# Patient Record
Sex: FEMALE | Race: WHITE | NOT HISPANIC OR LATINO | Employment: OTHER | URBAN - METROPOLITAN AREA
[De-identification: names, ages, dates, MRNs, and addresses within clinical notes are randomized per-mention and may not be internally consistent; named-entity substitution may affect disease eponyms.]

---

## 2018-03-03 PROCEDURE — 99284 EMERGENCY DEPT VISIT MOD MDM: CPT

## 2018-03-04 ENCOUNTER — HOSPITAL ENCOUNTER (EMERGENCY)
Facility: HOSPITAL | Age: 73
Discharge: HOME | End: 2018-03-04
Attending: EMERGENCY MEDICINE
Payer: MEDICARE

## 2018-03-04 VITALS
HEART RATE: 77 BPM | DIASTOLIC BLOOD PRESSURE: 73 MMHG | RESPIRATION RATE: 20 BRPM | TEMPERATURE: 98 F | OXYGEN SATURATION: 97 % | WEIGHT: 160 LBS | HEIGHT: 64 IN | BODY MASS INDEX: 27.31 KG/M2 | SYSTOLIC BLOOD PRESSURE: 137 MMHG
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
## 2018-03-04 DIAGNOSIS — S00.83XA CONTUSION OF FACE, INITIAL ENCOUNTER: Primary | ICD-10-CM

## 2018-03-04 RX ORDER — LIDOCAINE AND PRILOCAINE 25; 25 MG/G; MG/G
CREAM TOPICAL
Status: ON HOLD | COMMUNITY
Start: 2018-02-27 | End: 2019-08-07 | Stop reason: SDUPTHER

## 2018-03-04 RX ORDER — AMLODIPINE BESYLATE 10 MG/1
10 TABLET ORAL DAILY
Status: ON HOLD | COMMUNITY
End: 2019-08-07 | Stop reason: SDUPTHER

## 2018-03-04 RX ORDER — CYCLOBENZAPRINE HCL 5 MG
5 TABLET ORAL
COMMUNITY
End: 2019-04-11

## 2018-03-04 RX ORDER — METOPROLOL TARTRATE 100 MG/1
100 TABLET ORAL 2 TIMES DAILY
Status: ON HOLD | COMMUNITY
End: 2019-08-07 | Stop reason: SDUPTHER

## 2018-03-04 RX ORDER — TIOTROPIUM BROMIDE 18 UG/1
18 CAPSULE ORAL; RESPIRATORY (INHALATION) DAILY
Status: ON HOLD | COMMUNITY
Start: 2018-02-28 | End: 2019-08-07 | Stop reason: SDUPTHER

## 2018-03-04 RX ORDER — VALSARTAN 320 MG/1
320 TABLET ORAL DAILY
Status: ON HOLD | COMMUNITY
End: 2019-08-07 | Stop reason: SDUPTHER

## 2018-03-04 RX ORDER — PREDNISONE 50 MG/1
50 TABLET ORAL DAILY
Qty: 5 TABLET | Refills: 0 | Status: SHIPPED | OUTPATIENT
Start: 2018-03-04 | End: 2018-03-09

## 2018-03-04 RX ORDER — DEXLANSOPRAZOLE 60 MG/1
60 CAPSULE, DELAYED RELEASE ORAL DAILY
Status: ON HOLD | COMMUNITY
End: 2019-08-07 | Stop reason: SDUPTHER

## 2018-03-04 RX ORDER — GEMFIBROZIL 600 MG/1
600 TABLET, FILM COATED ORAL DAILY
Status: ON HOLD | COMMUNITY
End: 2019-08-07 | Stop reason: SDUPTHER

## 2018-03-04 RX ORDER — HYDROCODONE BITARTRATE AND ACETAMINOPHEN 5; 325 MG/1; MG/1
1 TABLET ORAL
COMMUNITY
Start: 2018-01-27 | End: 2019-04-11

## 2018-03-04 RX ORDER — VENLAFAXINE HYDROCHLORIDE 150 MG/1
150 CAPSULE, EXTENDED RELEASE ORAL
COMMUNITY
End: 2019-04-11

## 2018-03-04 RX ORDER — ROFLUMILAST 500 UG/1
500 TABLET ORAL DAILY
COMMUNITY
Start: 2017-04-20 | End: 2019-07-26

## 2018-03-04 RX ORDER — RANOLAZINE 1000 MG/1
1000 TABLET, EXTENDED RELEASE ORAL 2 TIMES DAILY
Status: ON HOLD | COMMUNITY
End: 2019-08-07 | Stop reason: SDUPTHER

## 2018-03-04 RX ORDER — FUROSEMIDE 20 MG/1
10 TABLET ORAL 2 TIMES DAILY
Status: ON HOLD | COMMUNITY
End: 2019-08-07 | Stop reason: HOSPADM

## 2018-03-04 RX ORDER — CLOPIDOGREL BISULFATE 75 MG/1
75 TABLET ORAL DAILY
Status: ON HOLD | COMMUNITY
End: 2019-08-07 | Stop reason: SDUPTHER

## 2018-03-04 RX ORDER — TEMAZEPAM 15 MG/1
15 CAPSULE ORAL NIGHTLY
Status: ON HOLD | COMMUNITY
End: 2019-08-07 | Stop reason: SDUPTHER

## 2018-03-04 RX ORDER — NORTRIPTYLINE HYDROCHLORIDE 10 MG/1
CAPSULE ORAL
COMMUNITY
Start: 2017-07-18 | End: 2019-04-11

## 2018-03-04 RX ORDER — TOLTERODINE TARTRATE 2 MG/1
4 TABLET, EXTENDED RELEASE ORAL DAILY
Status: ON HOLD | COMMUNITY
End: 2019-08-07 | Stop reason: SDUPTHER

## 2018-03-04 RX ORDER — IBANDRONATE SODIUM 150 MG/1
150 TABLET, FILM COATED ORAL
COMMUNITY
End: 2019-04-11

## 2018-03-04 RX ORDER — LORATADINE 10 MG/1
10 TABLET ORAL DAILY
COMMUNITY
End: 2019-05-22

## 2018-03-04 RX ORDER — PREGABALIN 100 MG/1
100 CAPSULE ORAL
COMMUNITY
Start: 2017-08-10 | End: 2019-04-11

## 2018-03-04 RX ORDER — ALBUTEROL SULFATE 90 UG/1
2 AEROSOL, METERED RESPIRATORY (INHALATION) EVERY 4 HOURS PRN
Status: ON HOLD | COMMUNITY
Start: 2017-01-11 | End: 2019-08-07 | Stop reason: SDUPTHER

## 2018-03-04 RX ORDER — METFORMIN HYDROCHLORIDE 500 MG/1
500 TABLET ORAL DAILY
Status: ON HOLD | COMMUNITY
Start: 2015-01-15 | End: 2019-08-07 | Stop reason: SDUPTHER

## 2018-03-04 RX ORDER — BUDESONIDE AND FORMOTEROL FUMARATE DIHYDRATE 160; 4.5 UG/1; UG/1
2 AEROSOL RESPIRATORY (INHALATION) 2 TIMES DAILY
COMMUNITY
Start: 2018-02-28 | End: 2019-07-26

## 2018-03-04 RX ORDER — VALACYCLOVIR HYDROCHLORIDE 1 G/1
1000 TABLET, FILM COATED ORAL
COMMUNITY
Start: 2018-02-27 | End: 2019-04-11

## 2018-03-04 RX ORDER — ALBUTEROL SULFATE 0.83 MG/ML
2.5 SOLUTION RESPIRATORY (INHALATION) 4 TIMES DAILY
COMMUNITY
Start: 2017-01-11 | End: 2019-07-26

## 2018-03-04 NOTE — ED PROVIDER NOTES
SCRIBE #1 NOTE: I, Darrel Avila, am scribing for, and in the presence of, Brennon Ac MD. I have scribed the entire note.      History      Chief Complaint   Patient presents with    Fall     patient fell 2 weeks ago, noticed today left eye with hematoma and bruising       Review of patient's allergies indicates:   Allergen Reactions    Pcn [penicillins] Anaphylaxis        HPI   HPI    3/4/2018, 12:13 AM   History obtained from the patient      History of Present Illness: Shireen Felix is a 72 y.o. female patient who presents to the Emergency Department for L periorbital contusion which pt noticed today when she woke up. Pt states she first noticed ecchymosis under her L eye with mild swelling and reports the swelling worsening throughout the day. Symptoms are constant and moderate in severity. Sx are exacerbated by nothing and relieved by nothing. Pt also reports mild pain to her L periorbital region. No other sxs reported. Pt states she is not sure of what happened. Pt denies recent injury/fall. Patient denies any fever, N/V/D, chills, visual disturbance, photophobia, eye redness, HA, dizziness, lightheadedness, nose pain and all other sxs at this time. No further complaints or concerns at this time.       Arrival mode: Personal vehicle      PCP: No primary care provider on file.       Past Medical History:  Past Medical History:   Diagnosis Date    Anticoagulant long-term use     Diabetes mellitus     Hypertension     Stroke        Past Surgical History:  Past Surgical History:   Procedure Laterality Date    BRAIN SURGERY           Family History:  History reviewed. No pertinent family history.    Social History:  Social History     Social History Main Topics    Smoking status: Never Smoker    Smokeless tobacco: Never Used    Alcohol use No    Drug use: Unknown    Sexual activity: Unknown       ROS   Review of Systems   Constitutional: Negative for chills and fever.   HENT: Negative for  congestion and sore throat.    Eyes:        (+)L periorbital contusion (+)mild periorbital pain   Respiratory: Negative for chest tightness and shortness of breath.    Cardiovascular: Negative for chest pain.   Gastrointestinal: Negative for abdominal pain, nausea and vomiting.   Genitourinary: Negative for difficulty urinating and dysuria.   Musculoskeletal: Negative for back pain and neck pain.   Skin: Negative for rash.   Neurological: Negative for dizziness, numbness and headaches.   Psychiatric/Behavioral: Negative for agitation and confusion.   All other systems reviewed and are negative.      Physical Exam      Initial Vitals   BP Pulse Resp Temp SpO2   03/04/18 0000 03/03/18 2357 03/03/18 2357 03/03/18 2357 03/03/18 2357   137/73 77 20 97.7 °F (36.5 °C) 97 %      MAP       03/04/18 0000       94.33          Physical Exam  Nursing Notes and Vital Signs Reviewed.  Constitutional: Patient is in no apparent distress. Well-developed and well-nourished.  Head:  Normocephalic.  Eyes: PERRL. EOM intact. Conjunctivae are not pale. No scleral icterus. L periorbital contusion noted.  ENT: Mucous membranes are moist. Oropharynx is clear and symmetric.   Neck: Supple. Full ROM. No lymphadenopathy.  Cardiovascular: Regular rate. Regular rhythm. No murmurs, rubs, or gallops. Distal pulses are 2+ and symmetric.  Pulmonary/Chest: No respiratory distress. Clear to auscultation bilaterally. No wheezing or rales.  Abdominal: Soft and non-distended.  There is no tenderness.  No rebound, guarding, or rigidity. Good bowel sounds.  Musculoskeletal: Moves all extremities. No obvious deformities. No edema. No calf tenderness.  Skin: Warm and dry.  Neurological:  Alert, awake, and appropriate.  Normal speech.  No acute focal neurological deficits are appreciated.  Psychiatric: Normal affect. Good eye contact. Appropriate in content.    ED Course    Procedures  ED Vital Signs:  Vitals:    03/03/18 2357 03/04/18 0000   BP:  137/73  "  Pulse: 77    Resp: 20    Temp: 97.7 °F (36.5 °C)    TempSrc: Oral    SpO2: 97%    Weight: 72.6 kg (160 lb)    Height: 5' 4" (1.626 m)        Imaging Results:    Per [facility], pt's CT results:   CT head without IV contrast: 1) Left periorbital soft tissue swelling. No acute intracranial abnormality. 2) Postoperative changes of bilateral frontal and temporal craniotomies. Aneurysm clip in the floor of the left middle cranial fossa.      CT maxillofacial without IV contrast: Left periorbital soft tissue swelling. No acute facial bone fracture.          The Emergency Provider reviewed the vital signs and test results, which are outlined above.    ED Discussion     1:48 AM: Reassessed pt at this time. Pt is laying comfortably in ED bed and in NAD. Pt is awake, alert, and oriented. Discussed with pt all pertinent ED information and results. Discussed pt dx and plan of tx. Gave pt all f/u and return to the ED instructions. All questions and concerns were addressed at this time. Pt expresses understanding of information and instructions, and is comfortable with plan to discharge. Pt is stable for discharge.    I discussed with patient and/or family/caretaker that evaluation in the ED does not suggest any emergent or life threatening medical conditions requiring immediate intervention beyond what was provided in the ED, and I believe patient is safe for discharge.  Regardless, an unremarkable evaluation in the ED does not preclude the development or presence of a serious of life threatening condition. As such, patient was instructed to return immediately for any worsening or change in current symptoms.      ED Medication(s):  Medications - No data to display    New Prescriptions    PREDNISONE (DELTASONE) 50 MG TAB    Take 1 tablet (50 mg total) by mouth once daily.       Follow-up Information     [facility] In 2 days.    Specialty:  Internal Medicine  Contact information:  [id number] " [facility] [address]  [phone]  Additional information:  (off [facility]) 1st floor                   Medical Decision Making    Medical Decision Making:   Clinical Tests:   Radiological Study: Ordered and Reviewed           Scribe Attestation:   Scribe #1: I performed the above scribed service and the documentation accurately describes the services I performed. I attest to the accuracy of the note.    Attending:   Physician Attestation Statement for Scribe #1: I, Brennon Ac MD, personally performed the services described in this documentation, as scribed by Darrel Avila, in my presence, and it is both accurate and complete.          Clinical Impression       ICD-10-CM ICD-9-CM   1. Contusion of face, initial encounter S00.83XA 920   2. Fall, initial encounter W19.XXXA E888.9       Disposition:   Disposition: Discharged  Condition: Stable         Brennon Ac MD  03/04/18 0211

## 2019-04-11 ENCOUNTER — OFFICE VISIT (OUTPATIENT)
Dept: OTOLARYNGOLOGY | Facility: CLINIC | Age: 74
End: 2019-04-11
Payer: MEDICARE

## 2019-04-11 VITALS
DIASTOLIC BLOOD PRESSURE: 74 MMHG | BODY MASS INDEX: 24.75 KG/M2 | TEMPERATURE: 98 F | SYSTOLIC BLOOD PRESSURE: 124 MMHG | HEART RATE: 74 BPM | WEIGHT: 144.19 LBS

## 2019-04-11 DIAGNOSIS — Z79.01 ANTICOAGULATED: ICD-10-CM

## 2019-04-11 DIAGNOSIS — J34.89 NASAL SEPTAL PERFORATION: Primary | ICD-10-CM

## 2019-04-11 DIAGNOSIS — J31.0 CHRONIC RHINITIS: ICD-10-CM

## 2019-04-11 PROCEDURE — 99999 PR PBB SHADOW E&M-EST. PATIENT-LVL IV: CPT | Mod: PBBFAC,,, | Performed by: PHYSICIAN ASSISTANT

## 2019-04-11 PROCEDURE — 99214 OFFICE O/P EST MOD 30 MIN: CPT | Mod: PBBFAC | Performed by: PHYSICIAN ASSISTANT

## 2019-04-11 PROCEDURE — 99204 PR OFFICE/OUTPT VISIT, NEW, LEVL IV, 45-59 MIN: ICD-10-PCS | Mod: S$PBB,,, | Performed by: PHYSICIAN ASSISTANT

## 2019-04-11 PROCEDURE — 99999 PR PBB SHADOW E&M-EST. PATIENT-LVL IV: ICD-10-PCS | Mod: PBBFAC,,, | Performed by: PHYSICIAN ASSISTANT

## 2019-04-11 PROCEDURE — 99204 OFFICE O/P NEW MOD 45 MIN: CPT | Mod: S$PBB,,, | Performed by: PHYSICIAN ASSISTANT

## 2019-04-11 NOTE — PROGRESS NOTES
"Subjective:       Patient ID: Shireen Felix is a 73 y.o. female.    Chief Complaint: Check nose (States "has holes in the inside of nose))    Patient is a very pleasant 73 year old female here to see me today for the first time for evaluation of "whistling" sound in her nose and chronic nasal congestion.  She has previously been followed by Dr. Spencer (ENT) for recurrent nasal lesion with epistaxis x 4 years.  She says she was later told she had "hole" in her nose and biopsy was recommended but not done per patient.  She is on Plavix.  She has not had nosebleed in over 4-6 weeks.  She denies significant nasal drainage but says she often has drip from her nose with postnasal drip (clear to yellow in color).  Denies nasal obstruction; often has nasal and chest congestion.  At times she feels as though she has swelling and itching in her nose, worse on the left side.  She has suffered with postherpetic neuralgia on LEFT scalp and face x 2 years with corneal ulceration in past.  She's not currently using any nasal sprays or gels.  She does not typically suffer with sinus infections.  She has been followed by Rheumatology at [place] (9/2018) for primary osteoarthritis.  She quit smoking 15 years ago; had smoked 1 pack per day x 35 years.  She saw Pulmonary earlier today and was advised to resume Flonase, Claritin and Mucinex.  Also was changed from daily Zithromax to Ciprofloxacin and oral steroids ordered.  No fever.     Review of Systems   Constitutional: Positive for fatigue. Negative for activity change, appetite change and fever.   HENT: Positive for congestion, hearing loss (hx RIGHT tympanoplasty), nosebleeds (none in 4-6 weeks), postnasal drip, rhinorrhea and sinus pressure. Negative for ear discharge, ear pain, facial swelling, sinus pain, sneezing, sore throat and trouble swallowing.    Eyes: Positive for visual disturbance. Negative for discharge.   Respiratory: Positive for cough and shortness of " breath.    Cardiovascular: Negative for chest pain.   Gastrointestinal: Negative for diarrhea, nausea and vomiting.   Musculoskeletal: Positive for arthralgias.   Allergic/Immunologic: Negative for environmental allergies.   Neurological: Positive for seizures (hx of craniotomy for aneurysm) and headaches. Negative for light-headedness.        Left facial neuralgia (postherpetic)   Hematological: Negative for adenopathy.   Psychiatric/Behavioral: Negative for confusion.       Objective:      Physical Exam   Constitutional: She is oriented to person, place, and time. She appears well-developed and well-nourished. She is cooperative. She does not appear ill. No distress.   HENT:   Head: Normocephalic and atraumatic.   Right Ear: External ear and ear canal normal. Tympanic membrane is scarred (with loss of landmarks) and retracted. Tympanic membrane is not erythematous. No middle ear effusion.   Left Ear: Tympanic membrane, external ear and ear canal normal. Tympanic membrane is not erythematous.  No middle ear effusion.   Nose: No mucosal edema (able to see left middle turbinate), rhinorrhea, nasal deformity or septal deviation. No epistaxis (no active bleeding). Right sinus exhibits no maxillary sinus tenderness and no frontal sinus tenderness. Left sinus exhibits maxillary sinus tenderness and frontal sinus tenderness.   Mouth/Throat: Uvula is midline, oropharynx is clear and moist and mucous membranes are normal. Mucous membranes are not pale and not dry. She has dentures. No trismus in the jaw. No oropharyngeal exudate or posterior oropharyngeal erythema.   Large septal perforation 1cm diameter with minimal crusting along posterior aspect with bowing inferiorly; left temporal wasting   Eyes: Pupils are equal, round, and reactive to light. Conjunctivae, EOM and lids are normal. Right eye exhibits no chemosis. Left eye exhibits no chemosis. Right conjunctiva is not injected. Left conjunctiva is not injected. No  scleral icterus. Right eye exhibits normal extraocular motion and no nystagmus. Left eye exhibits normal extraocular motion and no nystagmus.   Neck: Trachea normal and phonation normal. No tracheal tenderness present. No tracheal deviation present. No thyroid mass and no thyromegaly present.   Cardiovascular: Intact distal pulses.   Pulmonary/Chest: Effort normal. No stridor. No respiratory distress.   Abdominal: She exhibits no distension.   Lymphadenopathy:        Head (right side): No submental, no submandibular, no preauricular and no posterior auricular adenopathy present.        Head (left side): No submental, no submandibular, no preauricular and no posterior auricular adenopathy present.     She has no cervical adenopathy.   Neurological: She is alert and oriented to person, place, and time. No cranial nerve deficit.   Skin: Skin is warm and dry. No rash noted. No erythema.   Psychiatric: She has a normal mood and affect. Her behavior is normal.       Assessment:       1. Nasal septal perforation    2. Chronic rhinitis    3. Anticoagulated        Plan:         Discussed with patient that the whistling sound coming from her nose is due to her large septal perforation.  She denies previous biopsy of this area but says it's been discussed multiple times with Dr. Spencer (ENT).  I see under Care Everywhere tab (Dr. Spencer's notes states nasal biopsy on 7/26/2016 but I don't see path results).   I would recommend consistent use of frequent nasal saline spray or gel to keep nose moist.  Recommend RTC with MD for endoscopic nasal exam and to discuss biopsy of this area.  She is on Plavix so unsure if it could be done in clinic versus OR.  We discussed that certain rheumatologic conditions can present with nasal crusting and perforations.  And also that some perforations eventually need surgical repair as they can result in collapse of the nasal bridge.  She's had recent ESR and CRP which were elevated.  Needs to  get back in with Rheum (needs ANCA, FRANDY).  Recommend she complete oral Cipro and oral steroids as prescribed today by Pulmonary.

## 2019-05-22 ENCOUNTER — OFFICE VISIT (OUTPATIENT)
Dept: OTOLARYNGOLOGY | Facility: CLINIC | Age: 74
End: 2019-05-22
Payer: MEDICARE

## 2019-05-22 ENCOUNTER — TELEPHONE (OUTPATIENT)
Dept: OTOLARYNGOLOGY | Facility: CLINIC | Age: 74
End: 2019-05-22

## 2019-05-22 VITALS
BODY MASS INDEX: 25.32 KG/M2 | SYSTOLIC BLOOD PRESSURE: 128 MMHG | WEIGHT: 147.5 LBS | DIASTOLIC BLOOD PRESSURE: 71 MMHG | TEMPERATURE: 98 F | HEART RATE: 70 BPM

## 2019-05-22 DIAGNOSIS — J34.89 NASAL SEPTAL PERFORATION: Primary | ICD-10-CM

## 2019-05-22 DIAGNOSIS — R56.9 SEIZURE: ICD-10-CM

## 2019-05-22 DIAGNOSIS — I77.6 ARTERITIS: ICD-10-CM

## 2019-05-22 PROCEDURE — 99999 PR PBB SHADOW E&M-EST. PATIENT-LVL V: ICD-10-PCS | Mod: PBBFAC,,, | Performed by: ORTHOPAEDIC SURGERY

## 2019-05-22 PROCEDURE — 99215 OFFICE O/P EST HI 40 MIN: CPT | Mod: PBBFAC,PN | Performed by: ORTHOPAEDIC SURGERY

## 2019-05-22 PROCEDURE — 99214 OFFICE O/P EST MOD 30 MIN: CPT | Mod: S$PBB,,, | Performed by: ORTHOPAEDIC SURGERY

## 2019-05-22 PROCEDURE — 99999 PR PBB SHADOW E&M-EST. PATIENT-LVL V: CPT | Mod: PBBFAC,,, | Performed by: ORTHOPAEDIC SURGERY

## 2019-05-22 PROCEDURE — 99214 PR OFFICE/OUTPT VISIT, EST, LEVL IV, 30-39 MIN: ICD-10-PCS | Mod: S$PBB,,, | Performed by: ORTHOPAEDIC SURGERY

## 2019-05-22 RX ORDER — IPRATROPIUM BROMIDE AND ALBUTEROL SULFATE 2.5; .5 MG/3ML; MG/3ML
3 SOLUTION RESPIRATORY (INHALATION) 4 TIMES DAILY PRN
COMMUNITY
Start: 2019-02-08 | End: 2019-06-07

## 2019-05-22 RX ORDER — VENLAFAXINE HYDROCHLORIDE 150 MG/1
75 CAPSULE, EXTENDED RELEASE ORAL DAILY
Status: ON HOLD | COMMUNITY
End: 2019-08-07 | Stop reason: SDUPTHER

## 2019-05-22 RX ORDER — FLUTICASONE PROPIONATE 50 UG/1
2 POWDER, METERED RESPIRATORY (INHALATION) 2 TIMES DAILY
COMMUNITY
End: 2019-07-26

## 2019-05-22 NOTE — PROGRESS NOTES
Subjective:       Patient ID: Shireen Felix is a 73 y.o. female.    Chief Complaint: Nasal Scope (Nasal septal perforation) and Sore Throat (Began last night)    Patient is a very pleasant 73 year old female here to see me today in followup for evaluation of her nose.  She has a known septal perforation.  She is now using saline several times daily, she is not always consistent with its use.  She is using Neosporin at times as well.  She has not had any recent nose bleeds, was a previous issue for her but that has improved.  She has not seen Rheumatology, she has recently seen Neurology and was recommended to see Rheumatology.  She has a history of shingles to the left side of her face.  She is currently on prednisone as per Neurology.  She is continuing to have pain in the left forehead and scalp extending to the nose and midface.    Review of Systems   Constitutional: Negative for chills, fatigue, fever and unexpected weight change.   HENT: Positive for congestion, postnasal drip and rhinorrhea. Negative for dental problem, ear discharge, ear pain, facial swelling, hearing loss, nosebleeds, sinus pressure, sneezing, sore throat, tinnitus, trouble swallowing and voice change.    Eyes: Negative for redness, itching and visual disturbance.   Respiratory: Negative for cough, choking, shortness of breath and wheezing.    Cardiovascular: Negative for chest pain and palpitations.   Gastrointestinal: Negative for abdominal pain.        No reflux.   Musculoskeletal: Negative for gait problem.   Skin: Negative for rash.   Neurological: Positive for seizures, light-headedness and headaches. Negative for dizziness.       Objective:      Physical Exam   Constitutional: She is oriented to person, place, and time. She appears well-developed and well-nourished. No distress.   HENT:   Head: Normocephalic and atraumatic.   Right Ear: Tympanic membrane, external ear and ear canal normal.   Left Ear: Tympanic membrane, external  ear and ear canal normal.   Nose: Nose normal. No mucosal edema, rhinorrhea, nasal deformity or septal deviation. No epistaxis. Right sinus exhibits no maxillary sinus tenderness and no frontal sinus tenderness. Left sinus exhibits no maxillary sinus tenderness and no frontal sinus tenderness.   Mouth/Throat: Uvula is midline, oropharynx is clear and moist and mucous membranes are normal. Mucous membranes are not pale and not dry. No dental caries. No oropharyngeal exudate or posterior oropharyngeal erythema.   1 cm anterior septal perforation with smooth borders, no active bleeding or crusting   Eyes: Pupils are equal, round, and reactive to light. Conjunctivae, EOM and lids are normal. Right eye exhibits no chemosis. Left eye exhibits no chemosis. Right conjunctiva is not injected. Left conjunctiva is not injected. No scleral icterus. Right eye exhibits normal extraocular motion and no nystagmus. Left eye exhibits normal extraocular motion and no nystagmus.   Neck: Trachea normal and phonation normal. No tracheal tenderness present. No tracheal deviation present. No thyroid mass and no thyromegaly present.   Cardiovascular: Intact distal pulses.   Pulmonary/Chest: Effort normal. No stridor. No respiratory distress.   Abdominal: She exhibits no distension.   Lymphadenopathy:        Head (right side): No submental, no submandibular, no preauricular, no posterior auricular and no occipital adenopathy present.        Head (left side): No submental, no submandibular, no preauricular, no posterior auricular and no occipital adenopathy present.     She has no cervical adenopathy.   Neurological: She is alert and oriented to person, place, and time. No cranial nerve deficit.   Skin: Skin is warm and dry. No rash noted. No erythema.   Psychiatric: She has a normal mood and affect. Her behavior is normal.       Assessment:       1. Nasal septal perforation    2. Arteritis    3. Seizure        Plan:       1. Nasal septal  perforation:  Previous ENT notes reviewed.  I agree that evaluation with Rheumatology is the next step in evaluation, referral placed.  Discussed with patient that I would recommend proceeding with Rheumatology evaluation prior to nasal biopsy to see if needed.  She also had questions regarding her repair of her septal perforation.  Unfortunately, repairing septal perforations is quite complicated, and does have a high failure rate.  With her medical history, I would not recommend repair of her septal perforation at this time.  However, I would recommend aggressive moisture to her nasal cavity including saline spray during the day, ointment several times daily as well as a humidifier.  2.  Arteritis:  Her recent Neurology visit did raise the possibility of temporal arteritis a trigger for her pain.  I would recommend she follow with rheumatology as well as Neurology as above.  She is requesting a 2nd opinion from Neurology with regards to both this diagnosis as well as her seizures.  Appointment made today.  3.  Seizure:  Neurology appointment as above.

## 2019-05-22 NOTE — TELEPHONE ENCOUNTER
Dr. Santana referred for eval for temporal arteritis as a trigger for pain and seizure disorder.  Booked pt in next available.  Please contact pt if you can get her in at an earlier date.

## 2019-05-31 ENCOUNTER — INITIAL CONSULT (OUTPATIENT)
Dept: RHEUMATOLOGY | Facility: CLINIC | Age: 74
End: 2019-05-31
Payer: MEDICARE

## 2019-05-31 ENCOUNTER — LAB VISIT (OUTPATIENT)
Dept: LAB | Facility: HOSPITAL | Age: 74
End: 2019-05-31
Attending: INTERNAL MEDICINE
Payer: MEDICARE

## 2019-05-31 VITALS
WEIGHT: 149.94 LBS | DIASTOLIC BLOOD PRESSURE: 76 MMHG | HEIGHT: 64 IN | HEART RATE: 87 BPM | SYSTOLIC BLOOD PRESSURE: 144 MMHG | BODY MASS INDEX: 25.6 KG/M2

## 2019-05-31 DIAGNOSIS — B02.29 POSTHERPETIC NEURALGIA: ICD-10-CM

## 2019-05-31 DIAGNOSIS — J34.89 NASAL SEPTAL PERFORATION: Primary | ICD-10-CM

## 2019-05-31 DIAGNOSIS — J41.0 SIMPLE CHRONIC BRONCHITIS: ICD-10-CM

## 2019-05-31 DIAGNOSIS — I77.6 VASCULITIS: ICD-10-CM

## 2019-05-31 DIAGNOSIS — J34.89 NASAL SEPTAL PERFORATION: ICD-10-CM

## 2019-05-31 LAB
C3 SERPL-MCNC: 183 MG/DL (ref 50–180)
C4 SERPL-MCNC: 25 MG/DL (ref 11–44)
CCP AB SER IA-ACNC: <0.5 U/ML
CRP SERPL-MCNC: 9.9 MG/L (ref 0–8.2)
ERYTHROCYTE [SEDIMENTATION RATE] IN BLOOD BY WESTERGREN METHOD: 32 MM/HR (ref 0–36)
IGA SERPL-MCNC: 321 MG/DL (ref 40–350)
IGG SERPL-MCNC: 1240 MG/DL (ref 650–1600)
IGM SERPL-MCNC: 91 MG/DL (ref 50–300)
RHEUMATOID FACT SERPL-ACNC: 10 IU/ML (ref 0–15)

## 2019-05-31 PROCEDURE — 86706 HEP B SURFACE ANTIBODY: CPT

## 2019-05-31 PROCEDURE — 86038 ANTINUCLEAR ANTIBODIES: CPT

## 2019-05-31 PROCEDURE — 87340 HEPATITIS B SURFACE AG IA: CPT

## 2019-05-31 PROCEDURE — 86431 RHEUMATOID FACTOR QUANT: CPT

## 2019-05-31 PROCEDURE — 83516 IMMUNOASSAY NONANTIBODY: CPT

## 2019-05-31 PROCEDURE — 99205 OFFICE O/P NEW HI 60 MIN: CPT | Mod: S$PBB,,, | Performed by: INTERNAL MEDICINE

## 2019-05-31 PROCEDURE — 86160 COMPLEMENT ANTIGEN: CPT

## 2019-05-31 PROCEDURE — 99205 PR OFFICE/OUTPT VISIT, NEW, LEVL V, 60-74 MIN: ICD-10-PCS | Mod: S$PBB,,, | Performed by: INTERNAL MEDICINE

## 2019-05-31 PROCEDURE — 99213 OFFICE O/P EST LOW 20 MIN: CPT | Mod: PBBFAC,PN | Performed by: INTERNAL MEDICINE

## 2019-05-31 PROCEDURE — 86334 IMMUNOFIX E-PHORESIS SERUM: CPT | Mod: 26,,, | Performed by: PATHOLOGY

## 2019-05-31 PROCEDURE — 85652 RBC SED RATE AUTOMATED: CPT

## 2019-05-31 PROCEDURE — 86334 IMMUNOFIX E-PHORESIS SERUM: CPT

## 2019-05-31 PROCEDURE — 99999 PR PBB SHADOW E&M-EST. PATIENT-LVL III: CPT | Mod: PBBFAC,,, | Performed by: INTERNAL MEDICINE

## 2019-05-31 PROCEDURE — 86803 HEPATITIS C AB TEST: CPT

## 2019-05-31 PROCEDURE — 86704 HEP B CORE ANTIBODY TOTAL: CPT

## 2019-05-31 PROCEDURE — 82784 ASSAY IGA/IGD/IGG/IGM EACH: CPT | Mod: 59

## 2019-05-31 PROCEDURE — 36415 COLL VENOUS BLD VENIPUNCTURE: CPT

## 2019-05-31 PROCEDURE — 86140 C-REACTIVE PROTEIN: CPT

## 2019-05-31 PROCEDURE — 99999 PR PBB SHADOW E&M-EST. PATIENT-LVL III: ICD-10-PCS | Mod: PBBFAC,,, | Performed by: INTERNAL MEDICINE

## 2019-05-31 PROCEDURE — 86160 COMPLEMENT ANTIGEN: CPT | Mod: 59

## 2019-05-31 PROCEDURE — 84165 PROTEIN E-PHORESIS SERUM: CPT

## 2019-05-31 PROCEDURE — 84165 PROTEIN E-PHORESIS SERUM: CPT | Mod: 26,,, | Performed by: PATHOLOGY

## 2019-05-31 PROCEDURE — 86255 FLUORESCENT ANTIBODY SCREEN: CPT | Mod: 91

## 2019-05-31 PROCEDURE — 86334 PATHOLOGIST INTERPRETATION IFE: ICD-10-PCS | Mod: 26,,, | Performed by: PATHOLOGY

## 2019-05-31 PROCEDURE — 84165 PATHOLOGIST INTERPRETATION SPE: ICD-10-PCS | Mod: 26,,, | Performed by: PATHOLOGY

## 2019-05-31 PROCEDURE — 86200 CCP ANTIBODY: CPT

## 2019-05-31 NOTE — ASSESSMENT & PLAN NOTE
Workup as below to rule out underlying small vessel vasculitis or other collagen vascular disease causing nasal septal perforation.  If workup returns indeterminate on negative, would request ENT surgeon to perform biopsy.

## 2019-05-31 NOTE — PROGRESS NOTES
RHEUMATOLOGY CLINIC INITIAL VISIT    Reason for referral:-  Referred for evaluation of vasculitis.    Chief complaints:-  The left side of my head hurts.    HPI:-  Shireen Quinn a 73 y.o. pleasant female comes in for an initial visit with above chief complaints.  She was at her usual health with underlying osteoarthritis taking p.r.n. medications until 2 years ago when she hand shingles on the left temporal area extending into the face and scalp.  Since then she has had multiple problems as listed below.  Nothing has really helped for the post herpetic neuralgia.  In the meantime she was also diagnosed with aneurysm of intracerebral artery for which she has underwent surgical treatment both on the left and the right side status post craniotomy.  This surgery along with the posted fatigue neurology a has made her to have constant pain on the left side.     Review of Systems   Constitutional: Negative for chills and fever.   HENT: Positive for congestion, nosebleeds and sinus pain. Negative for sore throat.    Eyes: Negative for blurred vision and redness.   Respiratory: Positive for cough and shortness of breath.    Cardiovascular: Negative for chest pain and leg swelling.   Gastrointestinal: Negative for abdominal pain.   Genitourinary: Negative for dysuria.   Musculoskeletal: Positive for back pain and joint pain. Negative for falls, myalgias and neck pain.   Skin: Negative for rash.   Neurological: Positive for headaches.   Endo/Heme/Allergies: Does not bruise/bleed easily.   Psychiatric/Behavioral: Negative for memory loss. The patient does not have insomnia.        Past Medical History:   Diagnosis Date    Anticoagulant long-term use     Diabetes mellitus     Hypertension     Seizures     Stroke        Past Surgical History:   Procedure Laterality Date    BRAIN SURGERY          Social History     Tobacco Use    Smoking status: Former Smoker     Last attempt to quit: 4/11/2004     Years since  "quitting: 15.1    Smokeless tobacco: Never Used   Substance Use Topics    Alcohol use: No    Drug use: Not on file       History reviewed. No pertinent family history.    Review of patient's allergies indicates:   Allergen Reactions    Doxycycline Nausea Only     Unable to tolerate medication  Unable to tolerate medication  Unable to tolerate medication  Unable to tolerate medication  Unable to tolerate medication      Penicillins Anaphylaxis, Hives, Shortness Of Breath and Swelling    Oxycodone Other (See Comments)     sleepy  sleepy  sleepy  sleepy  sleepy      Adhesive Rash    Iodine and iodide containing products Rash     Patient denies allergy to IV dye and has had multiple CT studies and a heart cath without allergy to IV contrast or premedication.  She states allergy was to Betadine.  Patient denies allergy to IV dye and has had multiple CT studies and a heart cath without allergy to IV contrast or premedication.  She states allergy was to Betadine.      Povidone-iodine Rash    Sulfa (sulfonamide antibiotics) Itching       Vitals:    05/31/19 0817   BP: (!) 144/76   Pulse: 87   Weight: 68 kg (149 lb 14.6 oz)   Height: 5' 4" (1.626 m)   PainSc:   7       Physical Exam   Constitutional: She is oriented to person, place, and time and well-developed, well-nourished, and in no distress. No distress.   HENT:   Head: Normocephalic.   Mouth/Throat: Oropharynx is clear and moist.   Severe tenderness over left temporal area extending onto the left side it scalp all the way into the occipital region.   Eyes: Pupils are equal, round, and reactive to light. Conjunctivae and EOM are normal.   Neck: Normal range of motion. Neck supple.   Cardiovascular: Normal rate and intact distal pulses.   Pulmonary/Chest: Effort normal. No respiratory distress.   Abdominal: Soft. There is no tenderness.   Musculoskeletal:   Crepitus over multiple joints.  No synovitis over small joints of hands or feet.  No effusion over large " joints.   Neurological: She is alert and oriented to person, place, and time. No cranial nerve deficit.   Skin: Skin is warm. No rash noted. No erythema.   Psychiatric: Mood and affect normal.   Nursing note and vitals reviewed.      Radiographs:-  Independent visualization of images done.  Nasal septal perforation    Old and Outside medical records :-  Reviewed old and all outside medical records available in Care Everywhere..  Longstanding history of osteoarthritis.    Medication List with Changes/Refills   Current Medications    ALBUTEROL (PROVENTIL) 2.5 MG /3 ML (0.083 %) NEBULIZER SOLUTION    Inhale 2.5 mg into the lungs 4 (four) times daily.     ALBUTEROL 90 MCG/ACTUATION INHALER    Inhale 2 puffs into the lungs every 4 (four) hours as needed.     ALBUTEROL-IPRATROPIUM (DUO-NEB) 2.5 MG-0.5 MG/3 ML NEBULIZER SOLUTION    Take 3 mLs by nebulization 4 (four) times daily as needed.    AMLODIPINE (NORVASC) 10 MG TABLET    Take 10 mg by mouth once daily.     BUDESONIDE-FORMOTEROL 160-4.5 MCG (SYMBICORT) 160-4.5 MCG/ACTUATION HFAA    Inhale 2 puffs into the lungs 2 (two) times daily.     CHOLECALCIFEROL, VITAMIN D3, (VITAMIN D3 ORAL)    Take 10,000 Units by mouth. Twice weekly    CLOPIDOGREL (PLAVIX) 75 MG TABLET    Take 75 mg by mouth once daily.     DEXLANSOPRAZOLE (DEXILANT) 60 MG CAPSULE    Take 60 mg by mouth once daily.     FLUTICASONE PROPIONATE (FLOVENT DISKUS) 50 MCG/ACTUATION DSDV    Inhale 2 puffs into the lungs 2 (two) times daily. Controller    FUROSEMIDE (LASIX) 20 MG TABLET    Take 10 mg by mouth 2 (two) times daily.     GEMFIBROZIL (LOPID) 600 MG TABLET    Take 600 mg by mouth 2 (two) times daily.     LIDOCAINE-PRILOCAINE (EMLA) CREAM    Apply topically as needed. For itching or pain to face.    METFORMIN (GLUCOPHAGE) 500 MG TABLET    Take 500 mg by mouth once daily.     METOPROLOL TARTRATE (LOPRESSOR) 100 MG TABLET    Take 100 mg by mouth 2 (two) times daily.     NEBULIZER ACCESSORIES KIT    by  Miscellaneous route. Use as directed    RANITIDINE (ZANTAC) 75 MG TABLET    Take 150 mg by mouth 2 (two) times daily.     RANOLAZINE (RANEXA) 1,000 MG TB12    Take 1,000 mg by mouth 2 (two) times daily.     ROFLUMILAST 500 MCG TAB    Take 500 mcg by mouth once daily.     TEMAZEPAM (RESTORIL) 15 MG CAP    Take 15 mg by mouth every evening.     TIOTROPIUM (SPIRIVA) 18 MCG INHALATION CAPSULE    Inhale 18 mcg into the lungs once daily.     TOLTERODINE (DETROL) 2 MG TAB    Take 4 mg by mouth once daily.     VALSARTAN (DIOVAN) 320 MG TABLET    Take 320 mg by mouth once daily.     VENLAFAXINE (EFFEXOR-XR) 150 MG CP24    Take 75 mg by mouth once daily.       Assessment/Plans:-  1. Nasal septal perforation    2. Postherpetic neuralgia    3. Vasculitis    4. Simple chronic bronchitis      Problem List Items Addressed This Visit        Neuro    Postherpetic neuralgia    Current Assessment & Plan     Left-sided temporal headache highly likely secondary to post herpetic neuralgia rather than temporal arteritis.  No associated jaw claudication or polymyalgia rheumatica like symptoms.  Joint symptoms secondary to osteoarthritis.  No significant improvement on high-dose prednisone.  Check inflammatory markers and serologies.  Pretest probability for temporal arteritis is very low at this time.         Relevant Orders    Sedimentation rate    C-reactive protein    Ambulatory Referral to Pain Clinic       ENT    Nasal septal perforation - Primary    Current Assessment & Plan     Workup as below to rule out underlying small vessel vasculitis or other collagen vascular disease causing nasal septal perforation.  If workup returns indeterminate on negative, would request ENT surgeon to perform biopsy.         Relevant Orders    Anti-neutrophilic cytoplasmic antibody    Proteinase 3 Autoantibodies    Myeloperoxidase Antibody (MPO)    Hepatitis C antibody    Hepatitis B surface antigen    Hepatitis B surface antibody    Hepatitis B core  antibody, total    Immunoglobulins (IgG, IgA, IgM) Quantitative    Protein electrophoresis, serum    Immunofixation electrophoresis    C4 complement    C3 complement    Sedimentation rate    C-reactive protein    FRANDY Screen w/Reflex    Cyclic citrul peptide antibody, IgG    Rheumatoid factor       Pulmonary    Simple chronic bronchitis    Current Assessment & Plan     History of chronic bronchitis with COPD with intermittent exacerbations.  Will check CT scan of lungs especially in the context of vasculitis workup to look for any underlying granulomatous disease.           Other Visit Diagnoses     Vasculitis        Relevant Orders    CT Chest Without Contrast          Follow up in about 1 month (around 6/28/2019).    Thank you for allowing me to participate in the care ofShireen Felix.    Disclaimer: This note was prepared using voice recognition system and is likely to have sound alike errors and is not proof read.  Please call me with any questions.

## 2019-05-31 NOTE — ASSESSMENT & PLAN NOTE
History of chronic bronchitis with COPD with intermittent exacerbations.  Will check CT scan of lungs especially in the context of vasculitis workup to look for any underlying granulomatous disease.

## 2019-05-31 NOTE — LETTER
May 31, 2019      Mary Santana MD  [address] [place] [address] [address] [place] [id number]           [facility]  [address] [facility]  [place] [address]  Phone: [phone]  Fax: [phone]          Patient: Shireen Felix   MR Number: [id number]   YOB: 1945   Date of Visit: 5/31/2019       Dear Dr. Mary Santana:    Thank you for referring Shireen Felix to me for evaluation. Attached you will find relevant portions of my assessment and plan of care.    If you have questions, please do not hesitate to call me. I look forward to following Shireen Felix along with you.    Sincerely,    Gerardo Orozco MD    Enclosure  CC:  No Recipients    If you would like to receive this communication electronically, please contact [email or web address] or [phone] to request more information on [facility] access.    For providers and/or their staff who would like to refer a patient to [facility], please contact us through our one-stop-shop provider referral line, [facility], at [phone].    If you feel you have received this communication in error or would no longer like to receive these types of communications, please e-mail [email or web address]

## 2019-05-31 NOTE — ASSESSMENT & PLAN NOTE
Left-sided temporal headache highly likely secondary to post herpetic neuralgia rather than temporal arteritis.  No associated jaw claudication or polymyalgia rheumatica like symptoms.  Joint symptoms secondary to osteoarthritis.  No significant improvement on high-dose prednisone.  Check inflammatory markers and serologies.  Pretest probability for temporal arteritis is very low at this time.

## 2019-06-03 LAB
ALBUMIN SERPL ELPH-MCNC: 3.79 G/DL (ref 3.35–5.55)
ALPHA1 GLOB SERPL ELPH-MCNC: 0.37 G/DL (ref 0.17–0.41)
ALPHA2 GLOB SERPL ELPH-MCNC: 1.01 G/DL (ref 0.43–0.99)
ANA SER QL IF: NORMAL
ANCA AB TITR SER IF: NORMAL TITER
B-GLOBULIN SERPL ELPH-MCNC: 0.96 G/DL (ref 0.5–1.1)
GAMMA GLOB SERPL ELPH-MCNC: 1.18 G/DL (ref 0.67–1.58)
HBV CORE AB SERPL QL IA: NEGATIVE
HBV SURFACE AB SER-ACNC: NEGATIVE M[IU]/ML
HBV SURFACE AG SERPL QL IA: NEGATIVE
HCV AB SERPL QL IA: NEGATIVE
INTERPRETATION SERPL IFE-IMP: NORMAL
MYELOPEROXIDASE AB SER-ACNC: 4 UNITS
P-ANCA TITR SER IF: NORMAL TITER
PATHOLOGIST INTERPRETATION IFE: NORMAL
PATHOLOGIST INTERPRETATION SPE: NORMAL
PROT SERPL-MCNC: 7.3 G/DL (ref 6–8.4)
PROTEINASE3 IGG SER-ACNC: <0.2 U

## 2019-06-04 ENCOUNTER — HOSPITAL ENCOUNTER (OUTPATIENT)
Dept: RADIOLOGY | Facility: HOSPITAL | Age: 74
Discharge: HOME | End: 2019-06-04
Attending: PAIN MEDICINE
Payer: MEDICARE

## 2019-06-04 ENCOUNTER — HOSPITAL ENCOUNTER (OUTPATIENT)
Dept: RADIOLOGY | Facility: HOSPITAL | Age: 74
Discharge: HOME | End: 2019-06-04
Attending: INTERNAL MEDICINE
Payer: MEDICARE

## 2019-06-04 ENCOUNTER — OFFICE VISIT (OUTPATIENT)
Dept: PAIN MEDICINE | Facility: CLINIC | Age: 74
End: 2019-06-04
Payer: MEDICARE

## 2019-06-04 ENCOUNTER — OFFICE VISIT (OUTPATIENT)
Dept: INTERNAL MEDICINE | Facility: CLINIC | Age: 74
End: 2019-06-04
Payer: MEDICARE

## 2019-06-04 VITALS
OXYGEN SATURATION: 99 % | BODY MASS INDEX: 25.67 KG/M2 | HEART RATE: 88 BPM | SYSTOLIC BLOOD PRESSURE: 132 MMHG | DIASTOLIC BLOOD PRESSURE: 72 MMHG | WEIGHT: 150.38 LBS | HEIGHT: 64 IN | TEMPERATURE: 98 F

## 2019-06-04 VITALS
DIASTOLIC BLOOD PRESSURE: 62 MMHG | HEIGHT: 64 IN | SYSTOLIC BLOOD PRESSURE: 110 MMHG | BODY MASS INDEX: 25.67 KG/M2 | HEART RATE: 88 BPM | WEIGHT: 150.38 LBS

## 2019-06-04 DIAGNOSIS — E11.9 TYPE 2 DIABETES MELLITUS WITHOUT COMPLICATION, WITHOUT LONG-TERM CURRENT USE OF INSULIN: ICD-10-CM

## 2019-06-04 DIAGNOSIS — B02.29 POSTHERPETIC NEURALGIA: ICD-10-CM

## 2019-06-04 DIAGNOSIS — M47.812 SPONDYLOSIS OF CERVICAL REGION WITHOUT MYELOPATHY OR RADICULOPATHY: ICD-10-CM

## 2019-06-04 DIAGNOSIS — R56.9 FOCAL SEIZURES: ICD-10-CM

## 2019-06-04 DIAGNOSIS — I77.6 VASCULITIS: ICD-10-CM

## 2019-06-04 DIAGNOSIS — E78.5 HYPERLIPIDEMIA, UNSPECIFIED HYPERLIPIDEMIA TYPE: ICD-10-CM

## 2019-06-04 DIAGNOSIS — M47.812 SPONDYLOSIS OF CERVICAL REGION WITHOUT MYELOPATHY OR RADICULOPATHY: Primary | ICD-10-CM

## 2019-06-04 DIAGNOSIS — J44.9 CHRONIC OBSTRUCTIVE PULMONARY DISEASE, UNSPECIFIED COPD TYPE: ICD-10-CM

## 2019-06-04 DIAGNOSIS — M47.816 LUMBAR SPONDYLOSIS: ICD-10-CM

## 2019-06-04 DIAGNOSIS — E55.9 VITAMIN D DEFICIENCY: Primary | ICD-10-CM

## 2019-06-04 DIAGNOSIS — J18.9 PNEUMONIA DUE TO INFECTIOUS ORGANISM, UNSPECIFIED LATERALITY, UNSPECIFIED PART OF LUNG: ICD-10-CM

## 2019-06-04 PROCEDURE — 71046 X-RAY EXAM CHEST 2 VIEWS: CPT | Mod: 26,,, | Performed by: RADIOLOGY

## 2019-06-04 PROCEDURE — 71250 CT CHEST WITHOUT CONTRAST: ICD-10-PCS | Mod: 26,,, | Performed by: RADIOLOGY

## 2019-06-04 PROCEDURE — 72040 X-RAY EXAM NECK SPINE 2-3 VW: CPT | Mod: 26,,, | Performed by: RADIOLOGY

## 2019-06-04 PROCEDURE — 99999 PR PBB SHADOW E&M-EST. PATIENT-LVL V: CPT | Mod: PBBFAC,,, | Performed by: PAIN MEDICINE

## 2019-06-04 PROCEDURE — 99204 OFFICE O/P NEW MOD 45 MIN: CPT | Mod: S$PBB,,, | Performed by: FAMILY MEDICINE

## 2019-06-04 PROCEDURE — 71046 XR CHEST PA AND LATERAL: ICD-10-PCS | Mod: 26,,, | Performed by: RADIOLOGY

## 2019-06-04 PROCEDURE — 99204 PR OFFICE/OUTPT VISIT, NEW, LEVL IV, 45-59 MIN: ICD-10-PCS | Mod: S$PBB,,, | Performed by: PAIN MEDICINE

## 2019-06-04 PROCEDURE — 99204 OFFICE O/P NEW MOD 45 MIN: CPT | Mod: S$PBB,,, | Performed by: PAIN MEDICINE

## 2019-06-04 PROCEDURE — 72040 X-RAY EXAM NECK SPINE 2-3 VW: CPT | Mod: TC

## 2019-06-04 PROCEDURE — 99213 OFFICE O/P EST LOW 20 MIN: CPT | Mod: PBBFAC,25,27 | Performed by: FAMILY MEDICINE

## 2019-06-04 PROCEDURE — 71250 CT THORAX DX C-: CPT | Mod: 26,,, | Performed by: RADIOLOGY

## 2019-06-04 PROCEDURE — 71046 X-RAY EXAM CHEST 2 VIEWS: CPT | Mod: TC

## 2019-06-04 PROCEDURE — 99999 PR PBB SHADOW E&M-EST. PATIENT-LVL V: ICD-10-PCS | Mod: PBBFAC,,, | Performed by: PAIN MEDICINE

## 2019-06-04 PROCEDURE — 99215 OFFICE O/P EST HI 40 MIN: CPT | Mod: PBBFAC,25 | Performed by: PAIN MEDICINE

## 2019-06-04 PROCEDURE — 72100 XR LUMBAR SPINE AP AND LATERAL: ICD-10-PCS | Mod: 26,,, | Performed by: RADIOLOGY

## 2019-06-04 PROCEDURE — 99204 PR OFFICE/OUTPT VISIT, NEW, LEVL IV, 45-59 MIN: ICD-10-PCS | Mod: S$PBB,,, | Performed by: FAMILY MEDICINE

## 2019-06-04 PROCEDURE — 72040 XR CERVICAL SPINE AP LATERAL: ICD-10-PCS | Mod: 26,,, | Performed by: RADIOLOGY

## 2019-06-04 PROCEDURE — 99999 PR PBB SHADOW E&M-EST. PATIENT-LVL III: ICD-10-PCS | Mod: PBBFAC,,, | Performed by: FAMILY MEDICINE

## 2019-06-04 PROCEDURE — 72100 X-RAY EXAM L-S SPINE 2/3 VWS: CPT | Mod: 26,,, | Performed by: RADIOLOGY

## 2019-06-04 PROCEDURE — 99999 PR PBB SHADOW E&M-EST. PATIENT-LVL III: CPT | Mod: PBBFAC,,, | Performed by: FAMILY MEDICINE

## 2019-06-04 PROCEDURE — 72100 X-RAY EXAM L-S SPINE 2/3 VWS: CPT | Mod: TC

## 2019-06-04 PROCEDURE — 71250 CT THORAX DX C-: CPT | Mod: TC

## 2019-06-04 RX ORDER — LEVOFLOXACIN 750 MG/1
750 TABLET ORAL DAILY
Qty: 3 TABLET | Refills: 0 | Status: SHIPPED | OUTPATIENT
Start: 2019-06-04 | End: 2019-06-07

## 2019-06-04 RX ORDER — BENZONATATE 100 MG/1
CAPSULE ORAL
Refills: 0 | COMMUNITY
Start: 2019-05-31 | End: 2019-07-26

## 2019-06-04 RX ORDER — IPRATROPIUM BROMIDE AND ALBUTEROL SULFATE 2.5; .5 MG/3ML; MG/3ML
3 SOLUTION RESPIRATORY (INHALATION) ONCE
Status: DISCONTINUED | OUTPATIENT
Start: 2019-06-04 | End: 2019-06-06

## 2019-06-04 RX ORDER — DULOXETIN HYDROCHLORIDE 30 MG/1
30 CAPSULE, DELAYED RELEASE ORAL DAILY
Qty: 30 CAPSULE | Refills: 3 | Status: ON HOLD | OUTPATIENT
Start: 2019-06-04 | End: 2019-08-07

## 2019-06-04 RX ORDER — GUAIFENESIN AND PHENYLEPHRINE HCL 400; 10 MG/1; MG/1
500 TABLET ORAL 2 TIMES DAILY
Qty: 60 CAPSULE | Refills: 3 | Status: ON HOLD | OUTPATIENT
Start: 2019-06-04 | End: 2019-08-07 | Stop reason: HOSPADM

## 2019-06-04 RX ORDER — PROMETHAZINE HYDROCHLORIDE 6.25 MG/5ML
6.25 SYRUP ORAL EVERY 6 HOURS PRN
Qty: 118 ML | Refills: 0 | Status: ON HOLD | OUTPATIENT
Start: 2019-06-04 | End: 2019-08-07 | Stop reason: SDUPTHER

## 2019-06-04 NOTE — PROGRESS NOTES
Chief Pain Complaint:  Chief Complaint   Patient presents with    Pain     Generalized pain     History of Present Illness:   This patient is a 73 y.o. female who presents today complaining of the above noted pain/s. The patient describes the pain as follows.  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  She reports approximately 2 years ago she had to sudden deaths in the family which caused very stressful time for her and resulted in shingles rash in the left V1 distribution however she reports having excruciating pain in the left V1 and V2 distributions.  She denies having difficulty with eating food and brushing her teeth on the left side of her face however the left lateral side of her nose gets her excruciating pain.  She has been tried on numerous medications in the past including gabapentin, Lyrica, Valtrex, Celebrex, ibuprofen which have all provided minimal benefit however steroids have been most helpful.  Today she rates her pain as an 8/10 and describes a constant burning sensation the left side of her face in addition to radiation into her bilateral lower extremities, her right shoulder, her left upper extremity occasionally as a pins and needle sensation.  She does report having numbness and weakness in her left lower extremity.  She has been physical therapy which she completed in February of 2019 however this caused most of her low back and leg symptoms to worsen in addition to her post herpetic neuralgia to worsen.  She denies having bowel bladder difficulties.  Her symptoms are worse with activity such as walking in her somewhat improved with rest however she had finds it difficult to get into a comfortable position. She has been using topical lidocaine patches and applying to the left side of her face which does provide significant benefit.  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    Previous Therapy:  Medications:  Celebrex, ibuprofen, gabapentin, Lyrica, Valtrex, steroids  Injections: None  Surgeries: None  Physical Therapy: Completed in the Past    Past Surgical History:   Procedure Laterality Date    BRAIN SURGERY      HYSTERECTOMY         Imaging / Labs / Studies (reviewed on 6/4/2019):    Review of Systems:  Review of Systems   Constitutional: Negative for fever.   HENT:        Left-sided rash of the face   Eyes: Negative for blurred vision.   Respiratory: Negative for cough and wheezing.    Cardiovascular: Negative for chest pain and orthopnea.   Gastrointestinal: Negative for constipation, diarrhea, nausea and vomiting.   Genitourinary: Negative for dysuria.   Musculoskeletal: Positive for back pain.   Skin: Negative for itching and rash.   Neurological: Positive for weakness.   Endo/Heme/Allergies: Does not bruise/bleed easily.       Physical Exam:  There were no vitals taken for this visit. (reviewed on 6/4/2019)\  General    Constitutional: She is oriented to person, place, and time. She appears well-developed and well-nourished.   HENT:   Head: Normocephalic and atraumatic.   Eyes: EOM are normal.   Neck: Neck supple.   Pulmonary/Chest: Effort normal.   Abdominal: She exhibits no distension.   Neurological: She is alert and oriented to person, place, and time. No cranial nerve deficit.   Psychiatric: She has a normal mood and affect.     General Musculoskeletal Exam   Gait: antalgic     Back (L-Spine & T-Spine) / Neck (C-Spine) Exam     Tenderness Right paramedian tenderness of the Lower L-Spine. Left paramedian tenderness of the Lower L-Spine.     Back (L-Spine & T-Spine) Range of Motion   Extension: normal   Flexion: normal   Lateral bend right: normal   Lateral bend left: normal   Rotation right: normal   Rotation left:  normal     Spinal Sensation   Right Side Sensation  L-Spine Level: normal  Left Side Sensation  L-Spine Level: normal    Comments:  -tender to palpation over bilateral low lumbar facet; increased pain with left and right lateral bending and flexion extension; negative PSIS tenderness bilaterally      Muscle Strength   Right Lower Extremity   Hip Flexion: 5/5   Quadriceps:  5/5   Hamstrin/5   Anterior tibial:  5/5/5  Left Lower Extremity   Hip Flexion: 5/5   Quadriceps:  5/5   Hamstrin/5   Anterior tibial:  5/5/5     Reflexes     Left Side  Quadriceps:  2+  Achilles:  2+  Ankle Clonus:  absent    Right Side   Quadriceps:  2+  Achilles:  2+  Ankle Clonus:  absent      Assessment  Lumbar Radiculopathy  Lumbar Spondylosis    1. 73 y.o. year old patient with PMH of   Past Medical History:   Diagnosis Date    Anticoagulant long-term use     Diabetes mellitus     Glaucoma     Hypertension     Seizures     Stroke       presenting with pain located left side of her face, left lower extremity, lumbar spine  2. Pain Generators / Etiology: Lumbar Radiculopathy and Lumbar Spondylosis, post herpetic neuralgia  3. Failed Meds (E- Effective, NE- Not Effective):  Celebrex-minimally effective, ibuprofen-minimally effective, gabapentin-minimally effective, Lyrica-minimally effective, Valtrex-minimally effective, steroid-effective  4. Physical Therapy - Completed in the Past  5. Psychological comorbidities - None  6. Anticoagulants / Antiplatelets: Plavix     PLAN:  1. Medications:  Continue steroid and recommended tumor at 500 mg twice daily and prescribed Cymbalta 30 mg once daily; have written down a couple different topical creams which she can apply to her face specifically Aspercreme with lidocaine and Biofreeze; if she does not get relief with Cymbalta there are several other medications that can be tried such as Topamax, amitriptyline, nortriptyline to help with her post herpetic neuralgia  2. PT - patient has  completed physical therapy numerous times in the past; recommend continue exercises at home as tolerated  3. Psychological - none  4. Labs - obtain creatinine this patient reports having elevated creatinine in the past  5. Imaging - obtain will obtain lumbar MRI to evaluate for left lower extremity weakness and pins and needle sensation; will obtain cervical and lumbar spine x-rays  6. Interventions - schedule none; consider lumbar epidural steroid injection in the future  7. Referrals - none  8. Records - none  9. Follow up visit - follow up in clinic in 8 weeks  10. Patient Questions - answered all of the patient's questions regarding diagnosis, therapy, and treatment  11.  This condition does not require this patient to take time off of work    SCOUT Dias MD  Interventional Pain  [facility] [place]

## 2019-06-04 NOTE — PATIENT INSTRUCTIONS
-obtain lumbar MRI  -prescribed turmeric 500 mg twice daily  -will obtain cervical and lumbar spine x-rays today  -ordered creatinine  -prescribed Cymbalta 30 mg once daily  -wrote down the names of a few topical creams to apply to the post herpetic neuralgia on the side of the face  -follow up in clinic in 6 weeks

## 2019-06-04 NOTE — PROGRESS NOTES
Subjective:       Patient ID: Shireen Felix is a 73 y.o. female.    Chief Complaint: Establish Care    72 yo female with multiple medical problems. She has a h/o DM2, HTN, seizures and CVA. She reports her diabetes control has been good on metformin monotherapy. Her HTN has been well controlled. She has COPD which has been stable until recent diagnosis of pneumonia--just completed 7 day course of Levaquin yesterday; afebrile but still with significant cough, sputum production, wheeze. Reports using duoneb 4 times daily and albuterol MDI every 3-4 hours. She is currently wearing braces on her ankles evidently prescribed by her orthopedist and reports worsening back pain since she began wearing the braces. She is in a wheelchair today; her brother accompanies her to the visit and contributes to the history.     Ophtho: Dr. Florentino in [place]  MMG: [place]  Cscope: 3 years ago at [place] with 4 year f/u   Pap smear: s/p hysterectomy  Orthopedist: González    does not have any pertinent problems on file.  Past Medical History:   Diagnosis Date    Anticoagulant long-term use     Diabetes mellitus     Glaucoma     Hypertension     Seizures     Stroke      Past Surgical History:   Procedure Laterality Date    BRAIN SURGERY      HYSTERECTOMY       History reviewed. No pertinent family history.  Social History     Socioeconomic History    Marital status: Single     Spouse name: Not on file    Number of children: Not on file    Years of education: Not on file    Highest education level: Not on file   Occupational History    Not on file   Social Needs    Financial resource strain: Not on file    Food insecurity:     Worry: Not on file     Inability: Not on file    Transportation needs:     Medical: Not on file     Non-medical: Not on file   Tobacco Use    Smoking status: Former Smoker     Last attempt to quit: 4/11/2004     Years since quitting: 15.1    Smokeless tobacco: Never Used   Substance and  Sexual Activity    Alcohol use: No    Drug use: Not on file    Sexual activity: Not on file   Lifestyle    Physical activity:     Days per week: Not on file     Minutes per session: Not on file    Stress: Not on file   Relationships    Social connections:     Talks on phone: Not on file     Gets together: Not on file     Attends Buddhism service: Not on file     Active member of club or organization: Not on file     Attends meetings of clubs or organizations: Not on file     Relationship status: Not on file   Other Topics Concern    Not on file   Social History Narrative    Not on file     Review of Systems   Constitutional: Negative for fatigue and unexpected weight change.   HENT: Negative for hearing loss and sore throat.    Eyes: Negative for pain and visual disturbance.   Respiratory: Positive for cough and shortness of breath.    Cardiovascular: Negative for chest pain and palpitations.   Gastrointestinal: Negative for abdominal pain, constipation and diarrhea.   Genitourinary: Negative for difficulty urinating, dysuria and vaginal discharge.   Musculoskeletal: Positive for arthralgias, back pain and gait problem. Negative for myalgias.   Skin: Negative for rash and wound.   Neurological: Positive for seizures (reports partial seizure 2 weeks ago; has been under care of ). Negative for light-headedness and headaches.        Postherpetic neuralgia left side   Hematological: Negative.    Psychiatric/Behavioral: Negative for dysphoric mood. The patient is nervous/anxious.        Objective:     Vitals:    06/04/19 1409   BP: 132/72   Pulse: 88   Temp: 98.1 °F (36.7 °C)        Physical Exam   Constitutional: She is oriented to person, place, and time. She appears well-developed and well-nourished. She appears distressed.   HENT:   Head: Normocephalic and atraumatic.   Right Ear: Tympanic membrane normal.   Left Ear: Tympanic membrane normal.   Nose: Mucosal edema and rhinorrhea present. Right  sinus exhibits maxillary sinus tenderness. Right sinus exhibits no frontal sinus tenderness. Left sinus exhibits maxillary sinus tenderness. Left sinus exhibits no frontal sinus tenderness.   Mouth/Throat: Posterior oropharyngeal erythema present. No oropharyngeal exudate, posterior oropharyngeal edema or tonsillar abscesses.   Eyes: Pupils are equal, round, and reactive to light. EOM are normal.   Neck: Normal range of motion. Neck supple.   Cardiovascular: Normal rate, regular rhythm, normal heart sounds and intact distal pulses.   No murmur heard.  Pulmonary/Chest: Effort normal. No stridor. No respiratory distress. She has wheezes. She has no rales.   Frequent cough   Abdominal: Soft. Bowel sounds are normal.   Musculoskeletal: Normal range of motion. She exhibits no edema or tenderness.   Neurological: She is alert and oriented to person, place, and time.   Skin: Skin is warm and dry. Rash (left forehead with healing zoster rash) noted. No pallor.   Psychiatric: She has a normal mood and affect. Her behavior is normal.       Assessment:       1. Vitamin D deficiency    2. Hyperlipidemia, unspecified hyperlipidemia type    3. Type 2 diabetes mellitus without complication, without long-term current use of insulin    4. Chronic obstructive pulmonary disease, unspecified COPD type    5. Focal seizures        Plan:           Problem List Items Addressed This Visit        Neuro    Focal seizures    Current Assessment & Plan     Has seen ; wants to establish care at [facility] and has appt with Dr. Simpson 8/30/19. I will request records from neuromedical. Advised to notify us of any seizure activity in the future or seek care in ER           Other Visit Diagnoses     Vitamin D deficiency    -  Primary    Relevant Orders    Vitamin D (Completed)    Hyperlipidemia, unspecified hyperlipidemia type        Relevant Orders    Lipid panel (Completed)    Type 2 diabetes mellitus without complication, without long-term  current use of insulin        Relevant Orders    Hemoglobin A1c (Completed)    Chronic obstructive pulmonary disease, unspecified COPD type        Relevant Orders    Ambulatory Referral to Pulmonology      duoneb offered today at [facility] but patient refused.

## 2019-06-04 NOTE — LETTER
June 4, 2019      Gerardo Orozco MD  [address] [place] [address]           [facility]  [address]  [place] [address]  Phone: [phone]  Fax: [phone]          Patient: Shireen Felix   MR Number: [id number]   YOB: 1945   Date of Visit: 6/4/2019       Dear Dr. Gerardo Orozco:    Thank you for referring Shireen Felix to me for evaluation. Attached you will find relevant portions of my assessment and plan of care.    If you have questions, please do not hesitate to call me. I look forward to following Shireen Felix along with you.    Sincerely,    Desean Dias MD    Enclosure  CC:  No Recipients    If you would like to receive this communication electronically, please contact [email or web address] or [phone] to request more information on [facility] Link access.    For providers and/or their staff who would like to refer a patient to [facility], please contact us through our one-stop-shop provider referral line, [facility], at [phone].    If you feel you have received this communication in error or would no longer like to receive these types of communications, please e-mail [email or web address]

## 2019-06-05 ENCOUNTER — TELEPHONE (OUTPATIENT)
Dept: PAIN MEDICINE | Facility: CLINIC | Age: 74
End: 2019-06-05

## 2019-06-05 NOTE — TELEPHONE ENCOUNTER
----- Message from Ivon Preston sent at 6/5/2019  3:05 PM CDT -----  Contact: pt   Type:  Test Results    Who Called: Patient   Name of Test (Lab/Mammo/Etc): x-ray   Date of Test: 6/4/19  Ordering Provider: Dr. Dias   Where the test was performed: [facility]   Would the patient rather a call back or a response via [facility]? call  Best Call Back Number:[phone]  Additional Information: pt has tried calling several times

## 2019-06-05 NOTE — TELEPHONE ENCOUNTER
Pt called in reference to request for results. Pt stated that she wants to know the results of her CT scan and her chest xray. Notified pt that I will speak with Dr. Dias and give her a call after.

## 2019-06-06 ENCOUNTER — TELEPHONE (OUTPATIENT)
Dept: RHEUMATOLOGY | Facility: CLINIC | Age: 74
End: 2019-06-06

## 2019-06-06 ENCOUNTER — TELEPHONE (OUTPATIENT)
Dept: PAIN MEDICINE | Facility: CLINIC | Age: 74
End: 2019-06-06

## 2019-06-06 PROBLEM — J41.0 SIMPLE CHRONIC BRONCHITIS: Status: RESOLVED | Noted: 2019-05-31 | Resolved: 2019-06-06

## 2019-06-06 PROBLEM — R93.89 ABNORMAL CHEST X-RAY: Status: ACTIVE | Noted: 2019-02-12

## 2019-06-06 PROBLEM — I50.9 CHF (CONGESTIVE HEART FAILURE): Status: ACTIVE | Noted: 2019-06-06

## 2019-06-06 PROBLEM — J43.9 PULMONARY EMPHYSEMA: Status: ACTIVE | Noted: 2017-01-11

## 2019-06-06 PROBLEM — I63.9 CVA (CEREBRAL VASCULAR ACCIDENT): Status: ACTIVE | Noted: 2019-06-06

## 2019-06-06 PROBLEM — R09.89 CHEST CONGESTION: Status: ACTIVE | Noted: 2018-08-14

## 2019-06-06 PROBLEM — B02.29 POST HERPETIC NEURALGIA: Status: ACTIVE | Noted: 2017-07-18

## 2019-06-06 PROBLEM — D50.9 IRON DEFICIENCY ANEMIA: Status: ACTIVE | Noted: 2018-08-14

## 2019-06-06 PROBLEM — Z86.79 HISTORY OF CEREBRAL ANEURYSM REPAIR: Status: ACTIVE | Noted: 2018-08-01

## 2019-06-06 PROBLEM — R56.9 FOCAL SEIZURES: Status: ACTIVE | Noted: 2018-09-12

## 2019-06-06 PROBLEM — B02.29 POST HERPETIC NEURALGIA: Status: RESOLVED | Noted: 2017-07-18 | Resolved: 2019-06-06

## 2019-06-06 PROBLEM — I25.10 CORONARY ARTERY DISEASE: Status: ACTIVE | Noted: 2018-08-01

## 2019-06-06 PROBLEM — Z98.890 HISTORY OF CEREBRAL ANEURYSM REPAIR: Status: ACTIVE | Noted: 2018-08-01

## 2019-06-06 PROBLEM — R76.8 ELEVATED IGE LEVEL: Status: ACTIVE | Noted: 2018-03-05

## 2019-06-06 PROBLEM — M48.062 SPINAL STENOSIS OF LUMBAR REGION WITH NEUROGENIC CLAUDICATION: Status: ACTIVE | Noted: 2018-08-01

## 2019-06-06 NOTE — ASSESSMENT & PLAN NOTE
Has seen ; wants to establish care at [facility] and has appt with Dr. Simpson 8/30/19. I will request records from neuromedical. Advised to notify us of any seizure activity in the future or seek care in ER

## 2019-06-06 NOTE — TELEPHONE ENCOUNTER
----- Message from Gerardo Orozco MD sent at 6/6/2019  8:33 AM CDT -----  Please advised patient that CT scan and labs showed no evidence of vasculitis.  I have sent a message to her ENT surgeon to consider biopsy to find underlying problem causing her nasal septal perforation.  Thanks.

## 2019-06-06 NOTE — PROGRESS NOTES
Please advised patient that CT scan and labs showed no evidence of vasculitis.  I have sent a message to her ENT surgeon to consider biopsy to find underlying problem causing her nasal septal perforation.  Thanks.

## 2019-06-06 NOTE — TELEPHONE ENCOUNTER
Called patient to inform her that Dr. Orozco reviewed her CT scan and labs and they show no evidence vasculitis. I informed her that he sent a message to her ENT surgeon to consider biopsy to find the underlying problem causing her nasal septal perforation. Patient verbalized understanding.

## 2019-06-06 NOTE — TELEPHONE ENCOUNTER
Pt called to notify her of her Xray results per Dr. Dias. Pt also directed to call pulmonologist to for results of CT. Pt verbalized an understanding and had no further questions.

## 2019-06-06 NOTE — PROGRESS NOTES
CT scan shows no evidence of autoimmune disease.  Mild honeycombing at the base.  Will repeat CT scan in 6 months.

## 2019-06-07 ENCOUNTER — HOSPITAL ENCOUNTER (EMERGENCY)
Facility: HOSPITAL | Age: 74
Discharge: HOME | End: 2019-06-07
Attending: EMERGENCY MEDICINE
Payer: MEDICARE

## 2019-06-07 ENCOUNTER — TELEPHONE (OUTPATIENT)
Dept: INTERNAL MEDICINE | Facility: CLINIC | Age: 74
End: 2019-06-07

## 2019-06-07 VITALS
OXYGEN SATURATION: 98 % | WEIGHT: 147.38 LBS | DIASTOLIC BLOOD PRESSURE: 62 MMHG | HEART RATE: 94 BPM | RESPIRATION RATE: 14 BRPM | HEIGHT: 64 IN | SYSTOLIC BLOOD PRESSURE: 128 MMHG | BODY MASS INDEX: 25.16 KG/M2 | TEMPERATURE: 99 F

## 2019-06-07 DIAGNOSIS — M47.812 SPONDYLOSIS OF CERVICAL REGION WITHOUT MYELOPATHY OR RADICULOPATHY: Primary | ICD-10-CM

## 2019-06-07 DIAGNOSIS — J32.0 MAXILLARY SINUSITIS, UNSPECIFIED CHRONICITY: ICD-10-CM

## 2019-06-07 DIAGNOSIS — R06.02 SOB (SHORTNESS OF BREATH): ICD-10-CM

## 2019-06-07 DIAGNOSIS — J40 BRONCHITIS: ICD-10-CM

## 2019-06-07 DIAGNOSIS — J04.0 LARYNGITIS: Primary | ICD-10-CM

## 2019-06-07 LAB
ALBUMIN SERPL BCP-MCNC: 3 G/DL (ref 3.5–5.2)
ALP SERPL-CCNC: 43 U/L (ref 55–135)
ALT SERPL W/O P-5'-P-CCNC: 12 U/L (ref 10–44)
ANION GAP SERPL CALC-SCNC: 9 MMOL/L (ref 8–16)
AST SERPL-CCNC: 11 U/L (ref 10–40)
BASOPHILS # BLD AUTO: 0 K/UL (ref 0–0.2)
BASOPHILS NFR BLD: 0 % (ref 0–1.9)
BILIRUB SERPL-MCNC: 0.3 MG/DL (ref 0.1–1)
BNP SERPL-MCNC: 85 PG/ML (ref 0–99)
BUN SERPL-MCNC: 19 MG/DL (ref 8–23)
CALCIUM SERPL-MCNC: 9.7 MG/DL (ref 8.7–10.5)
CHLORIDE SERPL-SCNC: 113 MMOL/L (ref 95–110)
CO2 SERPL-SCNC: 18 MMOL/L (ref 23–29)
CREAT SERPL-MCNC: 1.2 MG/DL (ref 0.5–1.4)
DIFFERENTIAL METHOD: ABNORMAL
EOSINOPHIL # BLD AUTO: 0 K/UL (ref 0–0.5)
EOSINOPHIL NFR BLD: 0.1 % (ref 0–8)
ERYTHROCYTE [DISTWIDTH] IN BLOOD BY AUTOMATED COUNT: 16.9 % (ref 11.5–14.5)
EST. GFR  (AFRICAN AMERICAN): 51 ML/MIN/1.73 M^2
EST. GFR  (NON AFRICAN AMERICAN): 45 ML/MIN/1.73 M^2
GLUCOSE SERPL-MCNC: 140 MG/DL (ref 70–110)
HCT VFR BLD AUTO: 33.4 % (ref 37–48.5)
HGB BLD-MCNC: 11 G/DL (ref 12–16)
LYMPHOCYTES # BLD AUTO: 1.1 K/UL (ref 1–4.8)
LYMPHOCYTES NFR BLD: 10.2 % (ref 18–48)
MCH RBC QN AUTO: 28.9 PG (ref 27–31)
MCHC RBC AUTO-ENTMCNC: 32.9 G/DL (ref 32–36)
MCV RBC AUTO: 88 FL (ref 82–98)
MONOCYTES # BLD AUTO: 0.6 K/UL (ref 0.3–1)
MONOCYTES NFR BLD: 5.2 % (ref 4–15)
NEUTROPHILS # BLD AUTO: 9.2 K/UL (ref 1.8–7.7)
NEUTROPHILS NFR BLD: 84.5 % (ref 38–73)
PLATELET # BLD AUTO: 258 K/UL (ref 150–350)
PMV BLD AUTO: 8.8 FL (ref 9.2–12.9)
POTASSIUM SERPL-SCNC: 3.9 MMOL/L (ref 3.5–5.1)
PROT SERPL-MCNC: 7 G/DL (ref 6–8.4)
RBC # BLD AUTO: 3.8 M/UL (ref 4–5.4)
SODIUM SERPL-SCNC: 140 MMOL/L (ref 136–145)
TROPONIN I SERPL DL<=0.01 NG/ML-MCNC: <0.006 NG/ML (ref 0–0.03)
WBC # BLD AUTO: 10.92 K/UL (ref 3.9–12.7)

## 2019-06-07 PROCEDURE — 80053 COMPREHEN METABOLIC PANEL: CPT

## 2019-06-07 PROCEDURE — 94640 AIRWAY INHALATION TREATMENT: CPT

## 2019-06-07 PROCEDURE — 93005 ELECTROCARDIOGRAM TRACING: CPT

## 2019-06-07 PROCEDURE — 36415 COLL VENOUS BLD VENIPUNCTURE: CPT

## 2019-06-07 PROCEDURE — 63600175 PHARM REV CODE 636 W HCPCS: Performed by: EMERGENCY MEDICINE

## 2019-06-07 PROCEDURE — 93010 ELECTROCARDIOGRAM REPORT: CPT | Mod: ,,, | Performed by: INTERNAL MEDICINE

## 2019-06-07 PROCEDURE — 84484 ASSAY OF TROPONIN QUANT: CPT

## 2019-06-07 PROCEDURE — 99285 EMERGENCY DEPT VISIT HI MDM: CPT | Mod: 25

## 2019-06-07 PROCEDURE — 83880 ASSAY OF NATRIURETIC PEPTIDE: CPT

## 2019-06-07 PROCEDURE — 85025 COMPLETE CBC W/AUTO DIFF WBC: CPT

## 2019-06-07 PROCEDURE — 96374 THER/PROPH/DIAG INJ IV PUSH: CPT

## 2019-06-07 PROCEDURE — 93010 EKG 12-LEAD: ICD-10-PCS | Mod: ,,, | Performed by: INTERNAL MEDICINE

## 2019-06-07 PROCEDURE — 25000242 PHARM REV CODE 250 ALT 637 W/ HCPCS: Performed by: EMERGENCY MEDICINE

## 2019-06-07 RX ORDER — PREDNISONE 10 MG/1
10 TABLET ORAL DAILY
Qty: 36 TABLET | Refills: 0 | Status: SHIPPED | OUTPATIENT
Start: 2019-06-07 | End: 2019-06-17

## 2019-06-07 RX ORDER — IPRATROPIUM BROMIDE AND ALBUTEROL SULFATE 2.5; .5 MG/3ML; MG/3ML
3 SOLUTION RESPIRATORY (INHALATION)
Status: COMPLETED | OUTPATIENT
Start: 2019-06-07 | End: 2019-06-07

## 2019-06-07 RX ORDER — HYDROCODONE POLISTIREX AND CHLORPHENIRAMINE POLISTIREX 10; 8 MG/5ML; MG/5ML
5 SUSPENSION, EXTENDED RELEASE ORAL EVERY 12 HOURS PRN
Qty: 100 ML | Refills: 0 | Status: SHIPPED | OUTPATIENT
Start: 2019-06-07 | End: 2019-07-29 | Stop reason: SDUPTHER

## 2019-06-07 RX ORDER — IPRATROPIUM BROMIDE AND ALBUTEROL SULFATE 2.5; .5 MG/3ML; MG/3ML
3 SOLUTION RESPIRATORY (INHALATION) EVERY 6 HOURS PRN
Qty: 1 BOX | Refills: 1 | Status: ON HOLD | OUTPATIENT
Start: 2019-06-07 | End: 2019-08-07 | Stop reason: SDUPTHER

## 2019-06-07 RX ORDER — METHYLPREDNISOLONE SOD SUCC 125 MG
125 VIAL (EA) INJECTION
Status: COMPLETED | OUTPATIENT
Start: 2019-06-07 | End: 2019-06-07

## 2019-06-07 RX ORDER — CLARITHROMYCIN 250 MG/1
250 TABLET, FILM COATED ORAL 2 TIMES DAILY
Qty: 20 TABLET | Refills: 0 | Status: SHIPPED | OUTPATIENT
Start: 2019-06-07 | End: 2019-07-26

## 2019-06-07 RX ADMIN — IPRATROPIUM BROMIDE AND ALBUTEROL SULFATE 3 ML: .5; 3 SOLUTION RESPIRATORY (INHALATION) at 05:06

## 2019-06-07 RX ADMIN — METHYLPREDNISOLONE SODIUM SUCCINATE 125 MG: 125 INJECTION, POWDER, FOR SOLUTION INTRAMUSCULAR; INTRAVENOUS at 05:06

## 2019-06-07 NOTE — TELEPHONE ENCOUNTER
----- Message from Vondajude Harris sent at 6/7/2019  2:51 PM CDT -----  Contact: pt  Type:  Needs Medical Advice    Who Called: pt  Symptoms (please be specific): coughing   How long has patient had these symptoms:  She was seen day before yesterday. When she breath she coughs and she hurts all over. State she hasn't gotten any relief, steady coughinhg. States she got some delsym cough syrup and it hasn't help any.   Pharmacy name and phone #:    [facility] - [place] - [address]  [address]  [address]  [facility] [address]  Phone: [phone] Fax: [phone]  Would the patient rather a call back or a response via [facility]? Call back  Best Call Back Number: [phone]  Additional Information: .    Thank you

## 2019-06-07 NOTE — ED PROVIDER NOTES
SCRIBE #1 NOTE: I, Mariola Perez, am scribing for, and in the presence of, Nikolas Napier Jr., MD. I have scribed the entire note.      History      Chief Complaint   Patient presents with    Cough     Pt diagnosed with pneumonia 1 week ago. Sent by Dr. Ac for eval. Frequent dry cough noted.        Review of patient's allergies indicates:   Allergen Reactions    Doxycycline Nausea Only     Unable to tolerate medication  Unable to tolerate medication  Unable to tolerate medication  Unable to tolerate medication  Unable to tolerate medication      Penicillins Anaphylaxis, Hives, Shortness Of Breath and Swelling    Oxycodone Other (See Comments)     sleepy  sleepy  sleepy  sleepy  sleepy      Adhesive Rash    Iodine and iodide containing products Rash     Patient denies allergy to IV dye and has had multiple CT studies and a heart cath without allergy to IV contrast or premedication.  She states allergy was to Betadine.  Patient denies allergy to IV dye and has had multiple CT studies and a heart cath without allergy to IV contrast or premedication.  She states allergy was to Betadine.      Povidone-iodine Rash    Sulfa (sulfonamide antibiotics) Itching        HPI   HPI    6/7/2019, 5:28 PM   History obtained from the patient      History of Present Illness: Shireen Felix is a 73 y.o. female patient with PMHx COPD, DM, HTN, and stroke who presents to the Emergency Department for evaluation of cough which onset gradually. Patient was dx with pneumonia 1 week ago and states sxs have worsened the past week. She called Dr. Ac's office today and was told to come to ED for evaluation. Symptoms are constant and moderate in severity. No mitigating or exacerbating factors reported. Associated sxs include mild SOB. Patient denies any CP, fever, chills, extremity weakness/numbness, leg pain/swelling, palpitations, n/v/d, back pain, and all other sxs at this time. Prior Tx includes Levaquin and delsym cough  syrup with no improvement. No further complaints or concerns at this time.         Arrival mode: Personal vehicle    PCP: Jossie Ac MD       Past Medical History:  Past Medical History:   Diagnosis Date    Anticoagulant long-term use     COPD (chronic obstructive pulmonary disease)     Diabetes mellitus     Glaucoma     Hypertension     Seizures     Stroke        Past Surgical History:  Past Surgical History:   Procedure Laterality Date    BRAIN SURGERY      HYSTERECTOMY           Family History:  History reviewed. No pertinent family history.    Social History:  Social History     Tobacco Use    Smoking status: Former Smoker     Last attempt to quit: 4/11/2004     Years since quitting: 15.1    Smokeless tobacco: Never Used   Substance and Sexual Activity    Alcohol use: No    Drug use: unknown    Sexual activity: unknown       ROS   Review of Systems   Constitutional: Negative for activity change, appetite change, chills, diaphoresis, fatigue and fever.   HENT: Negative for congestion, ear pain, nosebleeds, rhinorrhea, sinus pain, sore throat and trouble swallowing.    Eyes: Negative for pain and discharge.   Respiratory: Positive for cough and shortness of breath. Negative for chest tightness, wheezing and stridor.    Cardiovascular: Negative for chest pain, palpitations and leg swelling.   Gastrointestinal: Negative for abdominal distention, abdominal pain, blood in stool, constipation, diarrhea, nausea and vomiting.   Genitourinary: Negative for difficulty urinating, dysuria, flank pain, frequency, hematuria and urgency.   Musculoskeletal: Negative for arthralgias, back pain, myalgias and neck pain.        (-) leg pain   Skin: Negative for pallor, rash and wound.   Neurological: Negative for dizziness, syncope, weakness, light-headedness, numbness and headaches.   Hematological: Does not bruise/bleed easily.   Psychiatric/Behavioral: Negative for confusion and self-injury.   All other  "systems reviewed and are negative.    Physical Exam      Initial Vitals [06/07/19 1715]   BP Pulse Resp Temp SpO2   135/76 98 16 99.1 °F (37.3 °C) 95 %      MAP       --          Physical Exam  Nursing Notes and Vital Signs Reviewed.  Constitutional: Patient is in no acute distress. Well-developed and well-nourished.  Head: Atraumatic. Normocephalic.  Eyes: PERRL. EOM intact. Conjunctivae are not pale. No scleral icterus.  ENT: Posterior pharyngeal erythema.    Neck: Supple. Full ROM. No lymphadenopathy.  Cardiovascular: Regular rate. Regular rhythm. No murmurs, rubs, or gallops. Distal pulses are 2+ and symmetric.  Pulmonary/Chest: Tachypneic with wheezing bilaterally. Hoarse with harsh, bronchitic cough.   Abdominal: Soft and non-distended.  There is no tenderness.  No rebound, guarding, or rigidity.   Musculoskeletal: Moves all extremities. No obvious deformities. No edema. No calf tenderness.  Skin: Warm and dry.  Neurological:  Alert, awake, and appropriate.  Normal speech.  No acute focal neurological deficits are appreciated.  Psychiatric: Normal affect. Good eye contact. Appropriate in content.    ED Course    Procedures  ED Vital Signs:  Vitals:    06/07/19 1715 06/07/19 1744 06/07/19 1747 06/07/19 1800   BP: 135/76   119/65   Pulse: 98 96 97 91   Resp: 16 (!) 22  (!) 23   Temp: 99.1 °F (37.3 °C)      TempSrc: Oral      SpO2: 95% 98%  100%   Weight: 66.8 kg (147 lb 6 oz)      Height: 5' 4" (1.626 m)       06/07/19 1846   BP: 128/62   Pulse: 94   Resp: 14   Temp:    TempSrc:    SpO2: 98%   Weight:    Height:        Abnormal Lab Results:  Labs Reviewed   CBC W/ AUTO DIFFERENTIAL - Abnormal; Notable for the following components:       Result Value    RBC 3.80 (*)     Hemoglobin 11.0 (*)     Hematocrit 33.4 (*)     RDW 16.9 (*)     MPV 8.8 (*)     Gran # (ANC) 9.2 (*)     Gran% 84.5 (*)     Lymph% 10.2 (*)     All other components within normal limits   COMPREHENSIVE METABOLIC PANEL - Abnormal; Notable for the " following components:    Chloride 113 (*)     CO2 18 (*)     Glucose 140 (*)     Albumin 3.0 (*)     Alkaline Phosphatase 43 (*)     eGFR if  51 (*)     eGFR if non  45 (*)     All other components within normal limits   B-TYPE NATRIURETIC PEPTIDE   TROPONIN I        All Lab Results:  Results for orders placed or performed during the hospital encounter of 06/07/19   CBC auto differential   Result Value Ref Range    WBC 10.92 3.90 - 12.70 K/uL    RBC 3.80 (L) 4.00 - 5.40 M/uL    Hemoglobin 11.0 (L) 12.0 - 16.0 g/dL    Hematocrit 33.4 (L) 37.0 - 48.5 %    Mean Corpuscular Volume 88 82 - 98 fL    Mean Corpuscular Hemoglobin 28.9 27.0 - 31.0 pg    Mean Corpuscular Hemoglobin Conc 32.9 32.0 - 36.0 g/dL    RDW 16.9 (H) 11.5 - 14.5 %    Platelets 258 150 - 350 K/uL    MPV 8.8 (L) 9.2 - 12.9 fL    Gran # (ANC) 9.2 (H) 1.8 - 7.7 K/uL    Lymph # 1.1 1.0 - 4.8 K/uL    Mono # 0.6 0.3 - 1.0 K/uL    Eos # 0.0 0.0 - 0.5 K/uL    Baso # 0.00 0.00 - 0.20 K/uL    Gran% 84.5 (H) 38.0 - 73.0 %    Lymph% 10.2 (L) 18.0 - 48.0 %    Mono% 5.2 4.0 - 15.0 %    Eosinophil% 0.1 0.0 - 8.0 %    Basophil% 0.0 0.0 - 1.9 %    Differential Method Automated    Comprehensive metabolic panel   Result Value Ref Range    Sodium 140 136 - 145 mmol/L    Potassium 3.9 3.5 - 5.1 mmol/L    Chloride 113 (H) 95 - 110 mmol/L    CO2 18 (L) 23 - 29 mmol/L    Glucose 140 (H) 70 - 110 mg/dL    BUN, Bld 19 8 - 23 mg/dL    Creatinine 1.2 0.5 - 1.4 mg/dL    Calcium 9.7 8.7 - 10.5 mg/dL    Total Protein 7.0 6.0 - 8.4 g/dL    Albumin 3.0 (L) 3.5 - 5.2 g/dL    Total Bilirubin 0.3 0.1 - 1.0 mg/dL    Alkaline Phosphatase 43 (L) 55 - 135 U/L    AST 11 10 - 40 U/L    ALT 12 10 - 44 U/L    Anion Gap 9 8 - 16 mmol/L    eGFR if African American 51 (A) >60 mL/min/1.73 m^2    eGFR if non African American 45 (A) >60 mL/min/1.73 m^2   B-Type natriuretic peptide (BNP)   Result Value Ref Range    BNP 85 0 - 99 pg/mL   Troponin I   Result Value Ref Range     Troponin I <0.006 0.000 - 0.026 ng/mL         Imaging Results:  Imaging Results          X-Ray Chest AP Portable (Final result)  Result time 06/07/19 18:17:00    Final result by Augustus Griffin MD (06/07/19 18:17:00)                 Impression:      1.  Negative for acute process involving the chest.    2.  Stable findings as noted above.      Electronically signed by: Augustus Griffin MD  Date:    06/07/2019  Time:    18:17             Narrative:    EXAMINATION:  XR CHEST AP PORTABLE    CLINICAL HISTORY:  Chest Pain;    COMPARISON:  June 4, 2019    FINDINGS:  EKG leads overlie the chest, which is lordotic in position.  Mildly low lung volumes.  Stable mild peripheral interstitial changes in the left greater than right lungs.  The lungs are free of new pulmonary opacities.  The cardiac silhouette size is normal. The trachea is midline and the mediastinal width is normal. Negative for focal infiltrate, effusion or pneumothorax. Pulmonary vasculature is normal. Negative for osseous abnormalities. Ectatic and tortuous aorta.  Convex left curvature of the upper thoracic spine.  Degenerative changes of the spine and both shoulder girdles again seen.                                 The EKG was ordered, reviewed, and independently interpreted by the ED provider.  Interpretation time: 17:38  Rate: 94 BPM  Rhythm: normal sinus rhythm  Interpretation: Septal infarct, age undetermined. No STEMI.           The Emergency Provider reviewed the vital signs and test results, which are outlined above.    ED Discussion     6:48 PM: Reassessed pt at this time. Discussed with pt all pertinent ED information and results. Discussed pt dx and plan of tx. Gave pt all f/u and return to the ED instructions. All questions and concerns were addressed at this time. Pt expresses understanding of information and instructions, and is comfortable with plan to discharge. Pt is stable for discharge.    I discussed with patient and/or family/caretaker  that evaluation in the ED does not suggest any emergent or life threatening medical conditions requiring immediate intervention beyond what was provided in the ED, and I believe patient is safe for discharge.  Regardless, an unremarkable evaluation in the ED does not preclude the development or presence of a serious of life threatening condition. As such, patient was instructed to return immediately for any worsening or change in current symptoms.      ED Medication(s):  Medications   albuterol-ipratropium 2.5 mg-0.5 mg/3 mL nebulizer solution 3 mL (3 mLs Nebulization Given by Other 6/7/19 2632)   methylPREDNISolone sodium succinate injection 125 mg (125 mg Intravenous Given 6/7/19 3102)       Follow-up Information     Mary Santana MD. Call in 3 days.    Specialty:  Otolaryngology  Why:  to schedule appt regarding your sinus problems and laryngitis  Contact information:  [address] [place] [address] [address] [place] [address]  [phone]             Ford Menjivar MD. Call in 3 days.    Specialty:  Pulmonary Disease  Why:  to schedule appt with lung specialist here at [facility]  Contact information:  [address]  [place] [address]  [phone]                     Discharge Medication List as of 6/7/2019  6:47 PM      START taking these medications    Details   albuterol-ipratropium (DUO-NEB) 2.5 mg-0.5 mg/3 mL nebulizer solution Take 3 mLs by nebulization every 6 (six) hours as needed for Wheezing. Rescue, Starting Fri 6/7/2019, Until Sat 6/6/2020, Print      clarithromycin (BIAXIN) 250 MG tablet Take 1 tablet (250 mg total) by mouth 2 (two) times daily., Starting Fri 6/7/2019, Print      hydrocodone-chlorpheniramine (TUSSIONEX) 10-8 mg/5 mL suspension Take 5 mLs by mouth every 12 (twelve) hours as needed for Cough or Congestion., Starting Fri 6/7/2019, Print      predniSONE (DELTASONE) 10 MG tablet Take 1 tablet (10 mg total) by mouth once daily. Take 5  tabs x 3 days, then Take 4 tabs x 3 days, then Take 3  tab x 3 days then resume 20 mg prednisone daily as previoulsy directed for 10 days, Starting Fri 6/7/2019, Until Mon 6/17/2019, Print             Medical Decision Making    Medical Decision Making:   Clinical Tests:   Lab Tests: Ordered and Reviewed  Radiological Study: Ordered and Reviewed  Medical Tests: Ordered and Reviewed           Scribe Attestation:   Scribe #1: I performed the above scribed service and the documentation accurately describes the services I performed. I attest to the accuracy of the note.    Attending:   Physician Attestation Statement for Scribe #1: I, Nikolas Napier Jr., MD, personally performed the services described in this documentation, as scribed by Mariola Perez, in my presence, and it is both accurate and complete.          Clinical Impression       ICD-10-CM ICD-9-CM   1. Laryngitis J04.0 464.00   2. SOB (shortness of breath) R06.02 786.05   3. Bronchitis J40 490   4. Maxillary sinusitis, unspecified chronicity J32.0 473.0       Disposition:   Disposition: Discharged  Condition: Stable         Nikolas Napier Jr., MD  06/07/19 4760

## 2019-06-07 NOTE — TELEPHONE ENCOUNTER
Please call and let her know that she should go to the hospital to be evaluated due to her COPD and failure to improve on Levaquin.

## 2019-06-10 ENCOUNTER — LAB VISIT (OUTPATIENT)
Dept: LAB | Facility: HOSPITAL | Age: 74
End: 2019-06-10
Attending: PAIN MEDICINE
Payer: MEDICARE

## 2019-06-10 ENCOUNTER — TELEPHONE (OUTPATIENT)
Dept: PAIN MEDICINE | Facility: CLINIC | Age: 74
End: 2019-06-10

## 2019-06-10 ENCOUNTER — OFFICE VISIT (OUTPATIENT)
Dept: OPHTHALMOLOGY | Facility: CLINIC | Age: 74
End: 2019-06-10
Payer: MEDICARE

## 2019-06-10 ENCOUNTER — TELEPHONE (OUTPATIENT)
Dept: PULMONOLOGY | Facility: CLINIC | Age: 74
End: 2019-06-10

## 2019-06-10 DIAGNOSIS — H17.9 CORNEAL SCAR, LEFT EYE: Primary | ICD-10-CM

## 2019-06-10 DIAGNOSIS — M47.816 LUMBAR SPONDYLOSIS: ICD-10-CM

## 2019-06-10 DIAGNOSIS — Z86.19: ICD-10-CM

## 2019-06-10 DIAGNOSIS — H35.371 EPIRETINAL MEMBRANE (ERM) OF RIGHT EYE: ICD-10-CM

## 2019-06-10 DIAGNOSIS — Z96.1 PSEUDOPHAKIA OF BOTH EYES: ICD-10-CM

## 2019-06-10 LAB
CREAT SERPL-MCNC: 0.8 MG/DL (ref 0.5–1.4)
EST. GFR  (AFRICAN AMERICAN): >60 ML/MIN/1.73 M^2
EST. GFR  (NON AFRICAN AMERICAN): >60 ML/MIN/1.73 M^2

## 2019-06-10 PROCEDURE — 92134 CPTRZ OPH DX IMG PST SGM RTA: CPT | Mod: PBBFAC | Performed by: OPTOMETRIST

## 2019-06-10 PROCEDURE — 92004 PR EYE EXAM, NEW PATIENT,COMPREHESV: ICD-10-PCS | Mod: S$PBB,,, | Performed by: OPTOMETRIST

## 2019-06-10 PROCEDURE — 99999 PR PBB SHADOW E&M-EST. PATIENT-LVL II: CPT | Mod: PBBFAC,,, | Performed by: OPTOMETRIST

## 2019-06-10 PROCEDURE — 92134 POSTERIOR SEGMENT OCT RETINA (OCULAR COHERENCE TOMOGRAPHY)-BOTH EYES: ICD-10-PCS | Mod: 26,S$PBB,, | Performed by: OPTOMETRIST

## 2019-06-10 PROCEDURE — 99999 PR PBB SHADOW E&M-EST. PATIENT-LVL II: ICD-10-PCS | Mod: PBBFAC,,, | Performed by: OPTOMETRIST

## 2019-06-10 PROCEDURE — 99212 OFFICE O/P EST SF 10 MIN: CPT | Mod: PBBFAC,25 | Performed by: OPTOMETRIST

## 2019-06-10 PROCEDURE — 36415 COLL VENOUS BLD VENIPUNCTURE: CPT

## 2019-06-10 PROCEDURE — 82565 ASSAY OF CREATININE: CPT

## 2019-06-10 PROCEDURE — 92004 COMPRE OPH EXAM NEW PT 1/>: CPT | Mod: S$PBB,,, | Performed by: OPTOMETRIST

## 2019-06-10 NOTE — TELEPHONE ENCOUNTER
Attempted to return call, no answer lvm      \  ----- Message from Vonda Harris sent at 6/10/2019  7:25 AM CDT -----  Contact: Rm Ojeda/brother  Type:  Sooner Apoointment Request    Caller is requesting a sooner appointment.  Caller declined first available appointment listed below.  Caller will not accept being placed on the waitlist and is requesting a message be sent to doctor.  Name of Caller:Rm Ojeda  When is the first available appointment?7/19  Symptoms:COPD/hosp f/u  Would the patient rather a call back or a response via [facility]? Call back  Best Call Back Number:[phone]  Additional Information: .    Thank you

## 2019-06-10 NOTE — PROGRESS NOTES
HPI     Eye Problem      Additional comments: Shingles              Comments     NP to DNL  Patient here today for shingles on the left side of the face.   Patient states shingles has been present for 2 years  Last eye exam 6 months ago  Denies any pain at this time            Last edited by Fern Florentino, PCT on 6/10/2019  3:13 PM. (History)              Assessment /Plan     For exam results, see Encounter Report.    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

## 2019-06-10 NOTE — Clinical Note
Please set up appt with Dr Mejia or Rich at eye [facility] for : PKP consult OS. Thank you!

## 2019-06-10 NOTE — TELEPHONE ENCOUNTER
----- Message from Christiana House sent at 6/10/2019  2:05 PM CDT -----  Good Afternoon!    This patient was scheduled for a MRI on today but her exam was canceled because the patient has aneurysm clips and she does not have her implant cards. Patient stated that one clip was placed in 2003 and another one was done in 2004 and they were both done in [place]. Before rescheduling the patient we will need operative reports. Will you please give this patient a call and update her with the next steps that will be taken. If you have any questions or if you are able to obtain the operative reports please contact Radiology at [phone].    Thank You,    Christiana AYALA  Radiology Dept

## 2019-06-11 DIAGNOSIS — M54.16 LUMBAR RADICULOPATHY: Primary | ICD-10-CM

## 2019-06-12 ENCOUNTER — TELEPHONE (OUTPATIENT)
Dept: PAIN MEDICINE | Facility: CLINIC | Age: 74
End: 2019-06-12

## 2019-06-12 ENCOUNTER — HOSPITAL ENCOUNTER (OUTPATIENT)
Dept: RADIOLOGY | Facility: HOSPITAL | Age: 74
Discharge: HOME | End: 2019-06-12
Attending: PAIN MEDICINE
Payer: MEDICARE

## 2019-06-12 DIAGNOSIS — M54.16 LUMBAR RADICULOPATHY: ICD-10-CM

## 2019-06-12 PROCEDURE — 72131 CT LUMBAR SPINE W/O DYE: CPT | Mod: 26,,, | Performed by: RADIOLOGY

## 2019-06-12 PROCEDURE — 72131 CT LUMBAR SPINE WITHOUT CONTRAST: ICD-10-PCS | Mod: 26,,, | Performed by: RADIOLOGY

## 2019-06-12 PROCEDURE — 72131 CT LUMBAR SPINE W/O DYE: CPT | Mod: TC

## 2019-06-12 NOTE — TELEPHONE ENCOUNTER
----- Message from Irwin Alex sent at 6/12/2019  3:10 PM CDT -----  Contact: pt brother  Type:  Test Results    Who Called: pt brother  Name of Test (Lab/Mammo/Etc): CT scan   Date of Test:  06/12/2019  Ordering Provider:  blane  Where the test was performed: [facility]  Would the patient rather a call back or a response via [facility]? Call   Best Call Back Number: [phone]   Additional Information:

## 2019-06-13 ENCOUNTER — TELEPHONE (OUTPATIENT)
Dept: PAIN MEDICINE | Facility: CLINIC | Age: 74
End: 2019-06-13

## 2019-06-13 DIAGNOSIS — M25.551 HIP PAIN, BILATERAL: Primary | ICD-10-CM

## 2019-06-13 DIAGNOSIS — I10 HYPERTENSION, UNSPECIFIED TYPE: Primary | ICD-10-CM

## 2019-06-13 DIAGNOSIS — M25.552 HIP PAIN, BILATERAL: Primary | ICD-10-CM

## 2019-06-13 NOTE — TELEPHONE ENCOUNTER
----- Message from Irwin Alex sent at 6/13/2019 10:20 AM CDT -----  Contact: pt brother   Type:  Needs Medical Advice    Who Called:  Rm   Symptoms (please be specific):   How long has patient had these symptoms:    Pharmacy name and phone #:   Would the patient rather a call back or a response via [facility]? Call   Best Call Back Number:  [phone] (home)   Additional Information: caller is requesting a call back from the nurse in regards to the caller having info for the nurse on the pt

## 2019-06-13 NOTE — TELEPHONE ENCOUNTER
----- Message from Desean Dias MD sent at 6/13/2019  7:52 AM CDT -----  I've ordered a lumbar ILESI for her, she is on plavix and will need to get cardiology approval to be off of this for 7 days.    SCOUT Dias MD  Interventional Pain Medicine  [facility] [place]

## 2019-06-13 NOTE — TELEPHONE ENCOUNTER
Sent message to cardiologist to get clearance to stop taking plavix. Soon as we get clearance procedure will be schedule and pt will be notified. All questions answered.

## 2019-06-14 ENCOUNTER — TELEPHONE (OUTPATIENT)
Dept: PAIN MEDICINE | Facility: CLINIC | Age: 74
End: 2019-06-14

## 2019-06-14 NOTE — TELEPHONE ENCOUNTER
Spoke with pt brother and gave information for the cardiologist at the [facility] to get medical clearance. All questions answered.

## 2019-06-19 ENCOUNTER — OFFICE VISIT (OUTPATIENT)
Dept: NEUROSURGERY | Facility: CLINIC | Age: 74
End: 2019-06-19
Payer: MEDICARE

## 2019-06-19 VITALS
WEIGHT: 147 LBS | BODY MASS INDEX: 25.1 KG/M2 | HEART RATE: 95 BPM | HEIGHT: 64 IN | DIASTOLIC BLOOD PRESSURE: 67 MMHG | SYSTOLIC BLOOD PRESSURE: 116 MMHG

## 2019-06-19 DIAGNOSIS — M48.062 LUMBAR STENOSIS WITH NEUROGENIC CLAUDICATION: ICD-10-CM

## 2019-06-19 DIAGNOSIS — M48.062 SPINAL STENOSIS OF LUMBAR REGION WITH NEUROGENIC CLAUDICATION: Primary | ICD-10-CM

## 2019-06-19 DIAGNOSIS — M43.16 SPONDYLOLISTHESIS, LUMBAR REGION: ICD-10-CM

## 2019-06-19 DIAGNOSIS — M51.36 DEGENERATIVE DISC DISEASE, LUMBAR: ICD-10-CM

## 2019-06-19 DIAGNOSIS — M54.16 LUMBAR RADICULOPATHY: ICD-10-CM

## 2019-06-19 PROCEDURE — 99204 PR OFFICE/OUTPT VISIT, NEW, LEVL IV, 45-59 MIN: ICD-10-PCS | Mod: S$PBB,,, | Performed by: NEUROLOGICAL SURGERY

## 2019-06-19 PROCEDURE — 99999 PR PBB SHADOW E&M-EST. PATIENT-LVL III: ICD-10-PCS | Mod: PBBFAC,,, | Performed by: NEUROLOGICAL SURGERY

## 2019-06-19 PROCEDURE — 99204 OFFICE O/P NEW MOD 45 MIN: CPT | Mod: S$PBB,,, | Performed by: NEUROLOGICAL SURGERY

## 2019-06-19 PROCEDURE — 99999 PR PBB SHADOW E&M-EST. PATIENT-LVL III: CPT | Mod: PBBFAC,,, | Performed by: NEUROLOGICAL SURGERY

## 2019-06-19 PROCEDURE — 99213 OFFICE O/P EST LOW 20 MIN: CPT | Mod: PBBFAC | Performed by: NEUROLOGICAL SURGERY

## 2019-06-19 NOTE — PROGRESS NOTES
Subjective:      Patient ID: Shireen Felix is a 73 y.o. female.    Chief Complaint: Lumbar Spine Pain (L-Spine) and Hip Pain    Patient is here today for evaluation of lumbar spine pain with a CT scan for my review  The symptoms began insidiously.  She has had that for a number of years getting worse over the past month.  Initially the pain was in the back 7/10 in severity it radiates down the left lower extremity greater than the right with associated numbness and tingling to the lateral aspect of her foot.  She has had no history of prior lumbar surgery.  She has had bilateral hip replacements in the past.  She cannot have a MRI because she has had a history of aneurysm clip bilaterally in 2004.  She has had an injection with pain management 5 days ago which she states helped her pain  She denies any weakness  Denies any bowel bladder symptoms  The pain has gotten better since it initially began        Review of Systems   Constitutional: Negative for activity change, appetite change and chills.   HENT: Negative for hearing loss, sore throat and tinnitus.    Eyes: Negative for pain, discharge and itching.   Cardiovascular: Negative for chest pain.   Gastrointestinal: Negative for abdominal pain.   Endocrine: Negative for cold intolerance and heat intolerance.   Genitourinary: Negative for difficulty urinating and dysuria.   Musculoskeletal: Positive for back pain and gait problem.   Allergic/Immunologic: Negative for environmental allergies.   Neurological: Positive for weakness. Negative for dizziness, tremors, light-headedness and headaches.   Hematological: Negative for adenopathy.   Psychiatric/Behavioral: Negative for agitation, behavioral problems and confusion.         Objective:         Nursing note and vitals reviewed  Gen:Oriented to person, place, and time.             Appears stated age   Skin: no rashes or lesions identified   Head:Normocephalic and atraumatic.  Nose: Nose normal.    Eyes: EOM  are normal. Pupils are equal, round, and reactive to light.   Neck: Neck supple. No masses or lesions palpated  Cardiovascular: Intact distal pulses.    Abdominal: Soft.   Neurological: Alert and oriented to person, place, and time.  No cranial nerve deficit.  Coordination normal. Normal muscle tone  Psychiatric: Normal mood and affect. Behavior is normal.      Back:       None  Paraspinal muscle spasms   None  Pain with flexion and extention   WNL  Range of motion    Neg  Straight leg raise     Motor   Right Right Left Left  Level Group   5  5  L2 Hip flexor (Psoas)   5  5  L3 Leg extension (Quads)   5   4+ L4 Dorsiflexion & foot inversion (Tibialis Anterior)   5   4+ L5 Great toe extension ( EHL)   5  5  S1 Foot eversion (Gastroc, PL & PB)     Sensation  NL Decreased (R/L/BL) Level Sensation    X  L2 Anterio-medial thigh   X  L3 Medial thigh around knee   X  L4 Medial foot    Diminished to light touch on the left L5 Dorsum foot   X  S1 Lateral foot     Reflex  2+  Patellar tendon (L4)   2+  Achilles tendon (S1)       CT scan lumbar spine    At the L2-3 level, there is a diffuse disc bulge ligamentum flavum hypertrophy and mild bilateral facet arthropathy resulting in moderate narrowing of the central canal.  The bilateral neural foraminal canals are mildly narrowed right greater than the left.    At the L3-4 level, there is grade 1 spondylolisthesis along with a diffuse disc bulge mild-to-moderate bilateral facet arthropathy and ligamentum flavum hypertrophy resulting in severe narrowing of the central canal.  The lateral recesses are significantly narrowed bilaterally.  The right neural foraminal canal is severely narrowed with effacement of the L3 nerve root by disc material.  The left neural foraminal canal is mildly to moderately narrowed.    At the L4-5 level, there is a diffuse disc bulge and mild-to-moderate bilateral facet arthropathy left greater than the right and ligamentum flavum hypertrophy resulting  in severe narrowing of the central canal and significant narrowing of either lateral recess.  The right neural foraminal canal is moderately narrowed.  The left neural foraminal canal is moderately to severely narrowed.    At the L5-S1 level, there is a diffuse disc bulge ligamentum flavum hypertrophy and mild bilateral facet arthropathy resulting in moderate narrowing of the central canal and moderate to severe narrowing of the left neural foraminal canal.  The right neural foraminal canal is mildly narrowed.  Assessment:     1. Spinal stenosis of lumbar region with neurogenic claudication    2. Degenerative disc disease, lumbar    3. Lumbar stenosis with neurogenic claudication    4. Spondylolisthesis, lumbar region    5. Lumbar radiculopathy      Plan:     Spinal stenosis of lumbar region with neurogenic claudication  -     CT Myelography Thoracic Lumbar Spine; Future; Expected date: 06/19/2019  -     Fl Myelogram 2 Or More Regions; Future; Expected date: 06/19/2019    Degenerative disc disease, lumbar    Lumbar stenosis with neurogenic claudication    Spondylolisthesis, lumbar region    Lumbar radiculopathy     Patient does have spondylolisthesis with degenerative changes at multiple levels.  She has had conservative management as well as epidural steroid injection with continued symptoms.  She cannot have an MRI because of the aneurysm clips.  We will order a CT myelogram of the thoracolumbar spine to make sure there is no underlying pathology causing canal compression    Thank you for the referral   Please call with any questions    Everardo Olson MD  Neurosurgery

## 2019-06-19 NOTE — LETTER
June 19, 2019      Desean Dias MD  [address] [place] [address] [address] [place] [address]           [facility]  [address] [facility]  [place] [address]  Phone: [phone]  Fax: [phone]          Patient: Shireen Felix   MR Number: [id number]   YOB: 1945   Date of Visit: 6/19/2019       Dear Dr. Desean Dias:    Thank you for referring Shireen Felix to me for evaluation. Attached you will find relevant portions of my assessment and plan of care.    If you have questions, please do not hesitate to call me. I look forward to following Shireen Felix along with you.    Sincerely,    Jesi Castellon, ASTER    Enclosure  CC:  No Recipients    If you would like to receive this communication electronically, please contact [email or web address] or [phone] to request more information on [employer] Link access.    For providers and/or their staff who would like to refer a patient to [facility], please contact us through our one-stop-shop provider referral line, [facility], at [phone].    If you feel you have received this communication in error or would no longer like to receive these types of communications, please e-mail [email or web address]

## 2019-06-21 ENCOUNTER — OFFICE VISIT (OUTPATIENT)
Dept: PULMONOLOGY | Facility: CLINIC | Age: 74
End: 2019-06-21
Payer: MEDICARE

## 2019-06-21 ENCOUNTER — LAB VISIT (OUTPATIENT)
Dept: LAB | Facility: HOSPITAL | Age: 74
End: 2019-06-21
Attending: INTERNAL MEDICINE
Payer: MEDICARE

## 2019-06-21 ENCOUNTER — OFFICE VISIT (OUTPATIENT)
Dept: RHEUMATOLOGY | Facility: CLINIC | Age: 74
End: 2019-06-21
Payer: MEDICARE

## 2019-06-21 VITALS
HEART RATE: 66 BPM | SYSTOLIC BLOOD PRESSURE: 116 MMHG | RESPIRATION RATE: 16 BRPM | HEIGHT: 64 IN | WEIGHT: 146.19 LBS | BODY MASS INDEX: 24.96 KG/M2 | DIASTOLIC BLOOD PRESSURE: 78 MMHG | OXYGEN SATURATION: 99 %

## 2019-06-21 VITALS
HEIGHT: 64 IN | WEIGHT: 145.94 LBS | HEART RATE: 79 BPM | DIASTOLIC BLOOD PRESSURE: 63 MMHG | SYSTOLIC BLOOD PRESSURE: 114 MMHG | BODY MASS INDEX: 24.92 KG/M2

## 2019-06-21 DIAGNOSIS — J84.9 ILD (INTERSTITIAL LUNG DISEASE): ICD-10-CM

## 2019-06-21 DIAGNOSIS — Z87.891 HISTORY OF SMOKING: ICD-10-CM

## 2019-06-21 DIAGNOSIS — B37.0 ORAL THRUSH: ICD-10-CM

## 2019-06-21 DIAGNOSIS — Z23 NEED FOR 23-POLYVALENT PNEUMOCOCCAL POLYSACCHARIDE VACCINE: ICD-10-CM

## 2019-06-21 DIAGNOSIS — J40 FREQUENT EPISODES OF BRONCHITIS: ICD-10-CM

## 2019-06-21 DIAGNOSIS — J34.89 NASAL SEPTAL PERFORATION: Primary | ICD-10-CM

## 2019-06-21 DIAGNOSIS — J44.9 CHRONIC OBSTRUCTIVE PULMONARY DISEASE, UNSPECIFIED COPD TYPE: Primary | ICD-10-CM

## 2019-06-21 DIAGNOSIS — J47.9 BRONCHIECTASIS WITHOUT COMPLICATION: ICD-10-CM

## 2019-06-21 DIAGNOSIS — R91.1 LUNG NODULE: ICD-10-CM

## 2019-06-21 LAB
CCP AB SER IA-ACNC: <0.5 U/ML
IGE SERPL-ACNC: <35 IU/ML (ref 0–100)

## 2019-06-21 PROCEDURE — 99999 PR PBB SHADOW E&M-EST. PATIENT-LVL III: ICD-10-PCS | Mod: PBBFAC,,, | Performed by: INTERNAL MEDICINE

## 2019-06-21 PROCEDURE — 99999 PR PBB SHADOW E&M-EST. PATIENT-LVL III: CPT | Mod: PBBFAC,,, | Performed by: INTERNAL MEDICINE

## 2019-06-21 PROCEDURE — 86235 NUCLEAR ANTIGEN ANTIBODY: CPT | Mod: 59

## 2019-06-21 PROCEDURE — 82784 ASSAY IGA/IGD/IGG/IGM EACH: CPT

## 2019-06-21 PROCEDURE — 99205 OFFICE O/P NEW HI 60 MIN: CPT | Mod: S$PBB,,, | Performed by: INTERNAL MEDICINE

## 2019-06-21 PROCEDURE — 86431 RHEUMATOID FACTOR QUANT: CPT

## 2019-06-21 PROCEDURE — 82784 ASSAY IGA/IGD/IGG/IGM EACH: CPT | Mod: 59

## 2019-06-21 PROCEDURE — 82785 ASSAY OF IGE: CPT

## 2019-06-21 PROCEDURE — 86256 FLUORESCENT ANTIBODY TITER: CPT

## 2019-06-21 PROCEDURE — G0009 ADMIN PNEUMOCOCCAL VACCINE: HCPCS | Mod: PBBFAC

## 2019-06-21 PROCEDURE — 86235 NUCLEAR ANTIGEN ANTIBODY: CPT

## 2019-06-21 PROCEDURE — 86331 IMMUNODIFFUSION OUCHTERLONY: CPT | Mod: 91

## 2019-06-21 PROCEDURE — 86200 CCP ANTIBODY: CPT

## 2019-06-21 PROCEDURE — 83516 IMMUNOASSAY NONANTIBODY: CPT | Mod: 59

## 2019-06-21 PROCEDURE — 99213 OFFICE O/P EST LOW 20 MIN: CPT | Mod: PBBFAC,25 | Performed by: INTERNAL MEDICINE

## 2019-06-21 PROCEDURE — 99214 OFFICE O/P EST MOD 30 MIN: CPT | Mod: S$PBB,,, | Performed by: INTERNAL MEDICINE

## 2019-06-21 PROCEDURE — 99205 PR OFFICE/OUTPT VISIT, NEW, LEVL V, 60-74 MIN: ICD-10-PCS | Mod: S$PBB,,, | Performed by: INTERNAL MEDICINE

## 2019-06-21 PROCEDURE — 99214 PR OFFICE/OUTPT VISIT, EST, LEVL IV, 30-39 MIN: ICD-10-PCS | Mod: S$PBB,,, | Performed by: INTERNAL MEDICINE

## 2019-06-21 PROCEDURE — 99213 OFFICE O/P EST LOW 20 MIN: CPT | Mod: PBBFAC,27,25 | Performed by: INTERNAL MEDICINE

## 2019-06-21 RX ORDER — NYSTATIN 100000 [USP'U]/ML
4 SUSPENSION ORAL 4 TIMES DAILY
Qty: 160 ML | Refills: 0 | Status: SHIPPED | OUTPATIENT
Start: 2019-06-21 | End: 2019-07-01

## 2019-06-21 NOTE — PROGRESS NOTES
Initial Outpatient Pulmonary Evaluation       SUBJECTIVE:     Chief Complaint   Patient presents with    COPD    Cough       History of Present Illness:    Patient is a 73 y.o. female previously seen by Dr.Katherine Shelley at [facility] since 2016 treated for COPD, on Symbicort, Spiriva, Daliresp, short-acting bronchodilator and azithromycin daily.    Patient states she has 10 years of smoking history less than a pack per day quit in 2004.    She remembers having frequent bronchitis since her being a on adult and used to use inhalers.    No family history of asthma.    Patient admitted to the hospital early June 2019 for worsening shortness of breath coughing and wheezing and was discharged on a course of azithromycin prednisone taper.    She has been having worsening coughing wheezing and shortness of Breath. She does produce yellow sputum.    Last seen by her pulmonologist April 2019.    Under care everywhere imaging studies pulmonary function test were reviewed extensively.    Has history of left eye shingles.  Rheumatoid arthritis was mentioned in the chart in 2016.  Currently not on disease modifying agents.  Following with rheumatology.    Does have history of nasal perforation.        Review of Systems   Constitutional: Negative for fever and chills.   HENT: Positive for hearing loss. Negative for nosebleeds.         Rhinitis     history of nasal perforation   Eyes: Negative for redness.        Visual disturbance    Respiratory: Negative for choking.    Genitourinary: Negative for hematuria.   Endocrine: Diabetes melllitus Negative for cold intolerance.    Musculoskeletal: Positive for arthralgias and back pain.   Gastrointestinal: Positive for acid reflux. Negative for vomiting.   Neurological: Negative for syncope.        History of stroke    Seizure    Post herpetic neuralgia   Hematological: Negative for adenopathy. Bleeds easily and excessive bruising.    Psychiatric/Behavioral: Negative for confusion.       Review of patient's allergies indicates:   Allergen Reactions    Doxycycline Nausea Only     Unable to tolerate medication  Unable to tolerate medication  Unable to tolerate medication  Unable to tolerate medication  Unable to tolerate medication      Penicillins Anaphylaxis, Hives, Shortness Of Breath and Swelling    Oxycodone Other (See Comments)     sleepy  sleepy  sleepy  sleepy  sleepy      Adhesive Rash    Iodine and iodide containing products Rash     Patient denies allergy to IV dye and has had multiple CT studies and a heart cath without allergy to IV contrast or premedication.  She states allergy was to Betadine.  Patient denies allergy to IV dye and has had multiple CT studies and a heart cath without allergy to IV contrast or premedication.  She states allergy was to Betadine.      Povidone-iodine Rash    Sulfa (sulfonamide antibiotics) Itching       Current Outpatient Medications   Medication Sig Dispense Refill    albuterol (PROVENTIL) 2.5 mg /3 mL (0.083 %) nebulizer solution Inhale 2.5 mg into the lungs 4 (four) times daily.       albuterol 90 mcg/actuation inhaler Inhale 2 puffs into the lungs every 4 (four) hours as needed.       albuterol-ipratropium (DUO-NEB) 2.5 mg-0.5 mg/3 mL nebulizer solution Take 3 mLs by nebulization every 6 (six) hours as needed for Wheezing. Rescue 1 Box 1    amLODIPine (NORVASC) 10 MG tablet Take 10 mg by mouth once daily.       benzonatate (TESSALON) 100 MG capsule TAKE 1 CAPSULE BY MOUTH THREE TIMES DAILY IF NEEDED FOR COUGH FOR 7 DAYS  0    budesonide-formoterol 160-4.5 mcg (SYMBICORT) 160-4.5 mcg/actuation HFAA Inhale 2 puffs into the lungs 2 (two) times daily.       cholecalciferol, vitamin D3, (VITAMIN D3 ORAL) Take 10,000 Units by mouth. Twice weekly      clarithromycin (BIAXIN) 250 MG tablet Take 1 tablet (250 mg total) by mouth 2 (two) times daily. 20 tablet 0    clopidogrel (PLAVIX) 75 mg  tablet Take 75 mg by mouth once daily.       dexlansoprazole (DEXILANT) 60 mg capsule Take 60 mg by mouth once daily.       DULoxetine (CYMBALTA) 30 MG capsule Take 1 capsule (30 mg total) by mouth once daily. 30 capsule 3    fluticasone propionate (FLOVENT DISKUS) 50 mcg/actuation DsDv Inhale 2 puffs into the lungs 2 (two) times daily. Controller      furosemide (LASIX) 20 MG tablet Take 10 mg by mouth 2 (two) times daily.       gemfibrozil (LOPID) 600 MG tablet Take 600 mg by mouth once daily.       hydrocodone-chlorpheniramine (TUSSIONEX) 10-8 mg/5 mL suspension Take 5 mLs by mouth every 12 (twelve) hours as needed for Cough or Congestion. 100 mL 0    lidocaine-prilocaine (EMLA) cream Apply topically as needed. For itching or pain to face.      metFORMIN (GLUCOPHAGE) 500 MG tablet Take 500 mg by mouth once daily.       metoprolol tartrate (LOPRESSOR) 100 MG tablet Take 100 mg by mouth 2 (two) times daily.       nebulizer accessories Kit by Miscellaneous route. Use as directed      promethazine (PHENERGAN) 6.25 mg/5 mL syrup Take 5 mLs (6.25 mg total) by mouth every 6 (six) hours as needed (cough). 118 mL 0    ranitidine (ZANTAC) 75 MG tablet Take 150 mg by mouth 2 (two) times daily.       ranolazine (RANEXA) 1,000 mg Tb12 Take 1,000 mg by mouth 2 (two) times daily.       roflumilast 500 mcg Tab Take 500 mcg by mouth once daily.       temazepam (RESTORIL) 15 mg Cap Take 15 mg by mouth every evening.       tiotropium (SPIRIVA) 18 mcg inhalation capsule Inhale 18 mcg into the lungs once daily.       tolterodine (DETROL) 2 MG Tab Take 4 mg by mouth once daily.       turmeric root extract 500 mg Cap Take 500 mg by mouth 2 (two) times daily. 60 capsule 3    valsartan (DIOVAN) 320 MG tablet Take 320 mg by mouth once daily.       venlafaxine (EFFEXOR-XR) 150 MG Cp24 Take 75 mg by mouth once daily.      nystatin (MYCOSTATIN) 100,000 unit/mL suspension Take 4 mLs (400,000 Units total) by mouth 4 (four)  "times daily. for 10 days 160 mL 0     No current facility-administered medications for this visit.        Past Medical History:   Diagnosis Date    Anticoagulant long-term use     COPD (chronic obstructive pulmonary disease)     Diabetes mellitus     Glaucoma     Hypertension     Seizures     Shingles 05/27/2017    Stroke      Past Surgical History:   Procedure Laterality Date    BRAIN SURGERY      HYSTERECTOMY       History reviewed. No pertinent family history.  Social History     Tobacco Use    Smoking status: Former Smoker     Packs/day: 1.00     Types: Cigarettes     Last attempt to quit: 4/11/2004     Years since quitting: 15.2    Smokeless tobacco: Never Used   Substance Use Topics    Alcohol use: No    Drug use: Never          OBJECTIVE:     Vital Signs (Most Recent)  Vital Signs  Pulse: 66  Resp: 16  SpO2: 99 %  BP: 116/78  Height and Weight  Height: 5' 4" (162.6 cm)  Weight: 66.3 kg (146 lb 2.6 oz)  BSA (Calculated - sq m): 1.73 sq meters  BMI (Calculated): 25.1  Weight in (lb) to have BMI = 25: 145.3]  Wt Readings from Last 2 Encounters:   06/21/19 66.3 kg (146 lb 2.6 oz)   06/19/19 66.7 kg (147 lb)         Physical Exam:  Physical Exam   Constitutional: She is oriented to person, place, and time. She appears well-developed and well-nourished.   HENT:   Head: Normocephalic.   Oral thrush noted on physical exam.   Neck: Neck supple.   Cardiovascular: Normal rate, regular rhythm and normal heart sounds.   Pulmonary/Chest: Normal expansion and effort normal. No stridor. No respiratory distress. She has decreased breath sounds. She has rhonchi. She has rales. She exhibits no tenderness.   Abdominal: Soft. She exhibits no distension.   Musculoskeletal: She exhibits no tenderness.   Lymphadenopathy:     She has no cervical adenopathy.   Neurological: She is alert and oriented to person, place, and time. Gait normal.   Skin: Skin is warm. No cyanosis. Nails show no clubbing.   Psychiatric: She has " a normal mood and affect. Her behavior is normal. Judgment and thought content normal.   Nursing note and vitals reviewed.      Laboratory  Lab Results   Component Value Date    WBC 10.92 06/07/2019    RBC 3.80 (L) 06/07/2019    HGB 11.0 (L) 06/07/2019    HCT 33.4 (L) 06/07/2019    MCV 88 06/07/2019    MCH 28.9 06/07/2019    MCHC 32.9 06/07/2019    RDW 16.9 (H) 06/07/2019     06/07/2019    MPV 8.8 (L) 06/07/2019    GRAN 9.2 (H) 06/07/2019    GRAN 84.5 (H) 06/07/2019    LYMPH 1.1 06/07/2019    LYMPH 10.2 (L) 06/07/2019    MONO 0.6 06/07/2019    MONO 5.2 06/07/2019    EOS 0.0 06/07/2019    BASO 0.00 06/07/2019    EOSINOPHIL 0.1 06/07/2019    BASOPHIL 0.0 06/07/2019       BMP  Lab Results   Component Value Date     06/07/2019    K 3.9 06/07/2019     (H) 06/07/2019    CO2 18 (L) 06/07/2019    BUN 19 06/07/2019    CREATININE 0.8 06/10/2019    CALCIUM 9.7 06/07/2019    ANIONGAP 9 06/07/2019    ESTGFRAFRICA >60.0 06/10/2019    EGFRNONAA >60.0 06/10/2019    AST 11 06/07/2019    ALT 12 06/07/2019    PROT 7.0 06/07/2019       Lab Results   Component Value Date    BNP 85 06/07/2019       No results found for: TSH    Lab Results   Component Value Date    SEDRATE 32 05/31/2019       Lab Results   Component Value Date    CRP 9.9 (H) 05/31/2019     Diagnostic Results:    I have personally reviewed today the following studies :    Chest x-ray June 4, 2019    EKG leads overlie the chest, which is lordotic in position.  Mildly low lung volumes.  Stable mild peripheral interstitial changes in the left greater than right lungs.  The lungs are free of new pulmonary opacities.  The cardiac silhouette size is normal. The trachea is midline and the mediastinal width is normal. Negative for focal infiltrate, effusion or pneumothorax. Pulmonary vasculature is normal. Negative for osseous abnormalities. Ectatic and tortuous aorta.  Convex left curvature of the upper thoracic spine.    CT chest June 4, 2019    Chronic  interstitial changes with juxtapleural reticulations with mild lower lobe bronchiectasis and suggestion of mild honeycombing at the lung bases as described above.  There is a 6.4 mm right lower lobe pulmonary nodule.    EKG June 7, 2019 normal sinus rhythm septal infarct     PFT done at [facility] between 2015 and 2018 shows apparently mild obstruction, small airways disease and reduction of DLCO.    No previous CT scan available in records.  ASSESSMENT/PLAN:     Chronic obstructive pulmonary disease, unspecified COPD type  -     Complete PFT with bronchodilator; Future; Expected date: 07/05/2019  -     Stress test, pulmonary; Future    ILD (interstitial lung disease)  -     Angiotensin converting enzyme; Future; Expected date: 06/21/2019  -     Anti Sm/RNP Antibody; Future; Expected date: 06/21/2019  -     Sjogrens syndrome-A extractable nuclear antibody; Future; Expected date: 06/21/2019  -     Anti-Centromere Antibody; Future; Expected date: 06/21/2019  -     Anti-scleroderma antibody; Future; Expected date: 06/21/2019  -     Sjogrens syndrome-B extractable nuclear antibody; Future; Expected date: 06/21/2019  -     Rheumatoid factor; Future; Expected date: 06/21/2019  -     Fungal Precipitins (Hypersensitivity PneumonitisI); Future; Expected date: 06/21/2019  -     Cyclic citrul peptide antibody, IgG; Future; Expected date: 06/21/2019  -     Myositis AssessR Plus Dasia-1; Future; Expected date: 06/21/2019  -     CT Chest Without Contrast; Future; Expected date: 12/21/2019    Oral thrush  -     nystatin (MYCOSTATIN) 100,000 unit/mL suspension; Take 4 mLs (400,000 Units total) by mouth 4 (four) times daily. for 10 days  Dispense: 160 mL; Refill: 0    History of smoking    Bronchiectasis without complication  -     Culture, Respiratory with Gram Stain; Future; Expected date: 06/21/2019  -     Fungus culture; Future; Expected date: 06/21/2019  -     AFB Culture & Smear; Future; Expected date: 06/21/2019    Need for  23-polyvalent pneumococcal polysaccharide vaccine  -     Pneumococcal Polysaccharide Vaccine (23 Valent) (SQ/IM)    Frequent episodes of bronchitis  -     IgG; Future; Expected date: 06/21/2019  -     IgM; Future; Expected date: 06/21/2019  -     IgE; Future; Expected date: 06/21/2019  -     IgA; Future    Lung nodule  -     CT Chest Without Contrast; Future; Expected date: 12/21/2019      Patient has on the CT scan UIP/probable UIP pattern with early honeycomb although findings are not severe however the location and the appearance on the CT scan are concerning for UIP.    There is suspicion of connective tissue disorder, will add rheumatology workup to the blood test ordered by Rheumatology.    Check PFT, check 6 min walking test.    Oral nystatin ordered.  Advised patient to rinse after using Symbicort.    To me, patient symptoms are out of proportion to her smoking history, she probably has a component of asthma worsened by chronic bronchitis/smoking.    Rule out CTD ILD.    Further recommendation to follow above workup.        Follow up in about 1 month (around 7/21/2019).    This note was prepared using voice recognition system and is likely to have sound alike errors that may have been overlooked even after proof reading.  Please call me with any questions    Discussed diagnosis, its evaluation, treatment and usual course. All questions answered.    Thank you for the courtesy of participating in the care of this patient    Lamin Sarabia MD

## 2019-06-21 NOTE — LETTER
June 21, 2019      Jossie Ac MD  [address]  [place] [address]           [facility]  [address]  [place] [address]  Phone: [phone]  Fax: [phone]          Patient: Shireen Felix   MR Number: [id number]   YOB: 1945   Date of Visit: 6/21/2019       Dear Dr. Jossie Ac:    Thank you for referring Shireen Felix to me for evaluation. Attached you will find relevant portions of my assessment and plan of care.    If you have questions, please do not hesitate to call me. I look forward to following Shireen Felix along with you.    Sincerely,    Lamin Sarabia MD    Enclosure  CC:  No Recipients    If you would like to receive this communication electronically, please contact [email or web address] or [phone] to request more information on [employer] Link access.    For providers and/or their staff who would like to refer a patient to [facility], please contact us through our one-stop-shop provider referral line, [facility] [place], at [phone].    If you feel you have received this communication in error or would no longer like to receive these types of communications, please e-mail [email or web address]

## 2019-06-21 NOTE — ASSESSMENT & PLAN NOTE
High suspicion for connective tissue disease related interstitial lung disease per pulmonologist.  Negative FRANDY, negative ANCA and rheumatoid factor.  Labs from pulmonologist is in process from today checking for scleroderma antibodies and myositis panel.  Advised to follow up with pulmonologist.  Will follow-up on the serologies.

## 2019-06-21 NOTE — PROGRESS NOTES
RHEUMATOLOGY CLINIC FOLLOW UP VISIT  Chief complaints:-  To follow up for vasculitis workup.    HPI:-  Shireen Quinn a 73 y.o. pleasant female comes in for a follow up visit.  She has progressive pain in the left side of the face and occiput.  She denies any significant change since last visit.  No photosensitive malar rash, sicca syndrome, Raynaud's phenomenon, seizures, psychosis, skin thickening, muscle weakness.    Review of Systems   Constitutional: Negative for chills and fever.   HENT: Positive for congestion, nosebleeds and sinus pain. Negative for sore throat.    Eyes: Negative for blurred vision and redness.   Respiratory: Positive for cough and shortness of breath.    Cardiovascular: Negative for chest pain and leg swelling.   Gastrointestinal: Negative for abdominal pain.   Genitourinary: Negative for dysuria.   Musculoskeletal: Positive for back pain and joint pain. Negative for falls, myalgias and neck pain.   Skin: Negative for rash.   Neurological: Positive for headaches.   Endo/Heme/Allergies: Does not bruise/bleed easily.   Psychiatric/Behavioral: Negative for memory loss. The patient does not have insomnia.        Past Medical History:   Diagnosis Date    Anticoagulant long-term use     COPD (chronic obstructive pulmonary disease)     Diabetes mellitus     Glaucoma     Hypertension     Seizures     Shingles 05/27/2017    Stroke        Past Surgical History:   Procedure Laterality Date    BRAIN SURGERY      HYSTERECTOMY          Social History     Tobacco Use    Smoking status: Former Smoker     Packs/day: 1.00     Types: Cigarettes     Last attempt to quit: 4/11/2004     Years since quitting: 15.2    Smokeless tobacco: Never Used   Substance Use Topics    Alcohol use: No    Drug use: Never       History reviewed. No pertinent family history.    Review of patient's allergies indicates:   Allergen Reactions    Doxycycline  "Nausea Only     Unable to tolerate medication  Unable to tolerate medication  Unable to tolerate medication  Unable to tolerate medication  Unable to tolerate medication      Penicillins Anaphylaxis, Hives, Shortness Of Breath and Swelling    Oxycodone Other (See Comments)     sleepy  sleepy  sleepy  sleepy  sleepy      Adhesive Rash    Iodine and iodide containing products Rash     Patient denies allergy to IV dye and has had multiple CT studies and a heart cath without allergy to IV contrast or premedication.  She states allergy was to Betadine.  Patient denies allergy to IV dye and has had multiple CT studies and a heart cath without allergy to IV contrast or premedication.  She states allergy was to Betadine.      Povidone-iodine Rash    Sulfa (sulfonamide antibiotics) Itching       Vitals:    06/21/19 1116   BP: 114/63   Pulse: 79   Weight: 66.2 kg (145 lb 15.1 oz)   Height: 5' 4" (1.626 m)   PainSc:   7       Physical Exam   Constitutional: She is oriented to person, place, and time and well-developed, well-nourished, and in no distress. No distress.   HENT:   Head: Normocephalic.   Mouth/Throat: Oropharynx is clear and moist.   Severe tenderness over left temporal area extending onto the left side it scalp all the way into the occipital region.   Eyes: Pupils are equal, round, and reactive to light. Conjunctivae and EOM are normal.   Neck: Normal range of motion. Neck supple.   Cardiovascular: Normal rate and intact distal pulses.   Pulmonary/Chest: Effort normal. No respiratory distress.   Abdominal: Soft. There is no tenderness.   Musculoskeletal:   Crepitus over multiple joints.  No synovitis over small joints of hands or feet.  No effusion over large joints.   Neurological: She is alert and oriented to person, place, and time. No cranial nerve deficit.   Skin: Skin is warm. No rash noted. No erythema.   Psychiatric: Mood and affect normal.   Nursing note and vitals reviewed.      Medication List " with Changes/Refills   Current Medications    ALBUTEROL (PROVENTIL) 2.5 MG /3 ML (0.083 %) NEBULIZER SOLUTION    Inhale 2.5 mg into the lungs 4 (four) times daily.     ALBUTEROL 90 MCG/ACTUATION INHALER    Inhale 2 puffs into the lungs every 4 (four) hours as needed.     ALBUTEROL-IPRATROPIUM (DUO-NEB) 2.5 MG-0.5 MG/3 ML NEBULIZER SOLUTION    Take 3 mLs by nebulization every 6 (six) hours as needed for Wheezing. Rescue    AMLODIPINE (NORVASC) 10 MG TABLET    Take 10 mg by mouth once daily.     BENZONATATE (TESSALON) 100 MG CAPSULE    TAKE 1 CAPSULE BY MOUTH THREE TIMES DAILY IF NEEDED FOR COUGH FOR 7 DAYS    BUDESONIDE-FORMOTEROL 160-4.5 MCG (SYMBICORT) 160-4.5 MCG/ACTUATION HFAA    Inhale 2 puffs into the lungs 2 (two) times daily.     CHOLECALCIFEROL, VITAMIN D3, (VITAMIN D3 ORAL)    Take 10,000 Units by mouth. Twice weekly    CLARITHROMYCIN (BIAXIN) 250 MG TABLET    Take 1 tablet (250 mg total) by mouth 2 (two) times daily.    CLOPIDOGREL (PLAVIX) 75 MG TABLET    Take 75 mg by mouth once daily.     DEXLANSOPRAZOLE (DEXILANT) 60 MG CAPSULE    Take 60 mg by mouth once daily.     DULOXETINE (CYMBALTA) 30 MG CAPSULE    Take 1 capsule (30 mg total) by mouth once daily.    FLUTICASONE PROPIONATE (FLOVENT DISKUS) 50 MCG/ACTUATION DSDV    Inhale 2 puffs into the lungs 2 (two) times daily. Controller    FUROSEMIDE (LASIX) 20 MG TABLET    Take 10 mg by mouth 2 (two) times daily.     GEMFIBROZIL (LOPID) 600 MG TABLET    Take 600 mg by mouth once daily.     HYDROCODONE-CHLORPHENIRAMINE (TUSSIONEX) 10-8 MG/5 ML SUSPENSION    Take 5 mLs by mouth every 12 (twelve) hours as needed for Cough or Congestion.    LIDOCAINE-PRILOCAINE (EMLA) CREAM    Apply topically as needed. For itching or pain to face.    METFORMIN (GLUCOPHAGE) 500 MG TABLET    Take 500 mg by mouth once daily.     METOPROLOL TARTRATE (LOPRESSOR) 100 MG TABLET    Take 100 mg by mouth 2 (two) times daily.     NEBULIZER ACCESSORIES KIT    by Miscellaneous route. Use  as directed    NYSTATIN (MYCOSTATIN) 100,000 UNIT/ML SUSPENSION    Take 4 mLs (400,000 Units total) by mouth 4 (four) times daily. for 10 days    PROMETHAZINE (PHENERGAN) 6.25 MG/5 ML SYRUP    Take 5 mLs (6.25 mg total) by mouth every 6 (six) hours as needed (cough).    RANITIDINE (ZANTAC) 75 MG TABLET    Take 150 mg by mouth 2 (two) times daily.     RANOLAZINE (RANEXA) 1,000 MG TB12    Take 1,000 mg by mouth 2 (two) times daily.     ROFLUMILAST 500 MCG TAB    Take 500 mcg by mouth once daily.     TEMAZEPAM (RESTORIL) 15 MG CAP    Take 15 mg by mouth every evening.     TIOTROPIUM (SPIRIVA) 18 MCG INHALATION CAPSULE    Inhale 18 mcg into the lungs once daily.     TOLTERODINE (DETROL) 2 MG TAB    Take 4 mg by mouth once daily.     TURMERIC ROOT EXTRACT 500 MG CAP    Take 500 mg by mouth 2 (two) times daily.    VALSARTAN (DIOVAN) 320 MG TABLET    Take 320 mg by mouth once daily.     VENLAFAXINE (EFFEXOR-XR) 150 MG CP24    Take 75 mg by mouth once daily.       Assessment/Plans:-  1. Nasal septal perforation    2. ILD (interstitial lung disease)      Problem List Items Addressed This Visit        ENT    Nasal septal perforation - Primary    Current Assessment & Plan     Detailed workup showed no evidence of vasculitis or other collagen vascular disease.  Message sent to ENT surgeon to consider biopsy for further workup if needed.            Pulmonary    ILD (interstitial lung disease)    Current Assessment & Plan     High suspicion for connective tissue disease related interstitial lung disease per pulmonologist.  Negative FRANDY, negative ANCA and rheumatoid factor.  Labs from pulmonologist is in process from today checking for scleroderma antibodies and myositis panel.  Advised to follow up with pulmonologist.  Will follow-up on the serologies.             Disclaimer: This note was prepared using voice recognition system and is likely to have sound alike errors and is not proof read.  Please call me with any questions.

## 2019-06-23 LAB
IGA SERPL-MCNC: 233 MG/DL (ref 40–350)
IGG SERPL-MCNC: 731 MG/DL (ref 650–1600)
IGM SERPL-MCNC: 64 MG/DL (ref 50–300)
RHEUMATOID FACT SERPL-ACNC: <10 IU/ML (ref 0–15)

## 2019-06-24 ENCOUNTER — TELEPHONE (OUTPATIENT)
Dept: OTOLARYNGOLOGY | Facility: CLINIC | Age: 74
End: 2019-06-24

## 2019-06-24 LAB
ACE SERPL-CCNC: 10 U/L (ref 16–85)
ANTI SM/RNP ANTIBODY: 11.77 EU (ref 0–19.99)
ANTI-CENTROMERE ANTIBODY: NEGATIVE
ANTI-SM/RNP INTERPRETATION: NEGATIVE
ANTI-SSA ANTIBODY: 0.27 EU (ref 0–19.99)
ANTI-SSA INTERPRETATION: NEGATIVE
ANTI-SSB ANTIBODY: 0 EU (ref 0–19.99)
ANTI-SSB INTERPRETATION: NEGATIVE
CENTROMERE AB TITR SER IF: NORMAL TITER
ENA SCL70 AB SER-ACNC: 2 UNITS

## 2019-06-24 NOTE — TELEPHONE ENCOUNTER
Please let the patient know I have reviewed Rheumatology's recommendations, and her workup for vasculitis is negative. I do not think that a biopsy is indicated at this time for her septal perforation as long as it is stable.  I would recommend she return to clinic in 3-4 months to see me or Yasmeen.  Continue with saline spray.

## 2019-06-24 NOTE — TELEPHONE ENCOUNTER
I conveyed to the patients brother, Rm, the information below.  He verbalized understanding.  Mr. Abdalla was listed as the number to call.

## 2019-06-25 DIAGNOSIS — M48.062 SPINAL STENOSIS OF LUMBAR REGION WITH NEUROGENIC CLAUDICATION: Primary | ICD-10-CM

## 2019-06-26 DIAGNOSIS — M48.062 SPINAL STENOSIS OF LUMBAR REGION WITH NEUROGENIC CLAUDICATION: Primary | ICD-10-CM

## 2019-06-26 LAB
A FUMIGATUS1 AB SER QL ID: NORMAL
A FUMIGATUS6 AB SER QL ID: NORMAL
A PULLULANS AB SER QL ID: NORMAL
PIGEON SERUM AB QL ID: NORMAL
S RECTIVIRGULA AB SER QL ID: NORMAL
T VULGARIS1 AB SER QL ID: NORMAL

## 2019-06-26 RX ORDER — DIPHENHYDRAMINE HCL 25 MG
50 CAPSULE ORAL ONCE
Status: CANCELLED | OUTPATIENT
Start: 2019-06-26 | End: 2019-06-26

## 2019-06-26 RX ORDER — DIPHENHYDRAMINE HCL 50 MG
50 CAPSULE ORAL SEE ADMIN INSTRUCTIONS
Refills: 0 | Status: ON HOLD | COMMUNITY
Start: 2019-06-26 | End: 2019-08-07 | Stop reason: HOSPADM

## 2019-06-26 RX ORDER — PROMETHAZINE HYDROCHLORIDE 6.25 MG/5ML
6.25 SYRUP ORAL EVERY 6 HOURS PRN
Qty: 118 ML | Refills: 0 | OUTPATIENT
Start: 2019-06-26

## 2019-06-26 RX ORDER — PREDNISONE 5 MG/1
50 TABLET ORAL DAILY
Status: CANCELLED | OUTPATIENT
Start: 2019-06-26 | End: 2019-06-29

## 2019-06-26 RX ORDER — PREDNISONE 50 MG/1
50 TABLET ORAL SEE ADMIN INSTRUCTIONS
Qty: 1 TABLET | Refills: 0 | Status: SHIPPED | OUTPATIENT
Start: 2019-07-05 | End: 2019-07-06

## 2019-06-26 RX ORDER — PREDNISONE 50 MG/1
50 TABLET ORAL SEE ADMIN INSTRUCTIONS
Qty: 1 TABLET | Refills: 0 | Status: SHIPPED | OUTPATIENT
Start: 2019-07-05 | End: 2019-07-26

## 2019-06-28 ENCOUNTER — TELEPHONE (OUTPATIENT)
Dept: INTERNAL MEDICINE | Facility: CLINIC | Age: 74
End: 2019-06-28

## 2019-06-28 NOTE — TELEPHONE ENCOUNTER
----- Message from Jesi Castellon LPN sent at 6/27/2019  4:58 PM CDT -----  Hello    Pt is scheduled for CT Myelogram for 7/5/19, requesting an order to hold Glucophage, phenergan, Restoril, Effexor for 48 hours, and an order to hold Plavix for 7 days    Thanks   Cherise

## 2019-07-01 ENCOUNTER — TELEPHONE (OUTPATIENT)
Dept: NEUROSURGERY | Facility: CLINIC | Age: 74
End: 2019-07-01

## 2019-07-01 ENCOUNTER — TELEPHONE (OUTPATIENT)
Dept: INTERNAL MEDICINE | Facility: CLINIC | Age: 74
End: 2019-07-01

## 2019-07-01 NOTE — TELEPHONE ENCOUNTER
6/26/19 Late entry spoke with pt in reference to the medications needing to be held for CT Myelogram, informed pt someone from this department will give her a call back once we have the authorizations from the prescribing Drs to hold each medications, pt stated understanding//ns

## 2019-07-01 NOTE — TELEPHONE ENCOUNTER
Spoke with pt in regards to her medications. Pt was informed that she won't be able to do her scans because we haven't gotten consent that patient can hold certain medications (listed in paperwork uploaded to pt chart).

## 2019-07-01 NOTE — TELEPHONE ENCOUNTER
Spoke with pt in reference to the message below, informed pt I haven't received clearance to hold medications, Spoke with Dr. Brandt gave clearance to hold all medications verbally informed him I will need to have something in writing, stated he will sign the faxed request, also Dr. Ac gave clearance for the above also today but didn't specify medications, informed pt I will call back with once I have authorization to hold medications and will reschedule CT myelogram, pt stated understanding//ns     ----- Message from Mehul Blount sent at 7/1/2019  3:45 PM CDT -----  Contact: Pt   States she's calling rg the meds that nurse is checking to getting for the pt / pt states she talked to Dr Ac's nurse and wanted to inform and can be reached at [phone]//fer/hussein

## 2019-07-01 NOTE — TELEPHONE ENCOUNTER
----- Message from Rusty Munguia sent at 7/1/2019  2:07 PM CDT -----  Contact: Pt  Please give pt a call at .[phone] regarding a medication and a procedure.

## 2019-07-01 NOTE — TELEPHONE ENCOUNTER
Pt has been cleared to  Hold medications as requested by Dr. Olson . Fax was sent over to there office .     [phone]

## 2019-07-02 ENCOUNTER — TELEPHONE (OUTPATIENT)
Dept: INTERNAL MEDICINE | Facility: CLINIC | Age: 74
End: 2019-07-02

## 2019-07-02 ENCOUNTER — TELEPHONE (OUTPATIENT)
Dept: NEUROSURGERY | Facility: CLINIC | Age: 74
End: 2019-07-02

## 2019-07-02 NOTE — TELEPHONE ENCOUNTER
Spoke with pt in reference to CT myelogram, received written clearance from Dr. Ac to hold Metformin, Phenergan, Restoril, Effexor for 48 hours and to hold Plavix for 7 days pt is aware of the above and has written down the information CT is scheduled for 7/12/19 11:30am//ns    ----- Message from Sue Napier MA sent at 7/2/2019  8:39 AM CDT -----  Contact: self [phone]      ----- Message -----  From: Bennett Talamantes  Sent: 7/2/2019   8:20 AM  To: [facility]    Pt would like to follow-up with nurse regarding fax sent by Dr. Ac.  Please call back at [phone].   Edin Hunter

## 2019-07-02 NOTE — TELEPHONE ENCOUNTER
----- Message from Shayyrick Martinez sent at 7/1/2019  4:39 PM CDT -----  Contact: pt  .Type:  Patient Returning Call    Who Called: pt  Who Left Message for Patient:    Does the patient know what this is regarding?: paper work   Would the patient rather a call back or a response via [employer]?  Call back   Best Call Back Number: [phone]  Additional Information:  Dr. Olson office has not received paperwork

## 2019-07-03 LAB
EJ AB SER QL: NOT DETECTED
ENA JO1 AB SER IA-ACNC: <1 INDEX
KU AB SER QL: NOT DETECTED
MI2 AB SER QL: NOT DETECTED
OJ AB SER QL: NOT DETECTED
PL12 AB SER QL: NOT DETECTED
PL7 AB SER QL: NOT DETECTED
SRP AB SERPL QL: NOT DETECTED

## 2019-07-09 ENCOUNTER — TELEPHONE (OUTPATIENT)
Dept: NEUROSURGERY | Facility: CLINIC | Age: 74
End: 2019-07-09

## 2019-07-09 NOTE — TELEPHONE ENCOUNTER
Spoke w/ patient brother advise xray's for hips has been scheduled as directed before CT appt .    Patient brother verbalized understanding.    Aissatou Rivero PA-C  You; Augustin Nichols PA-C 2 hours ago (1:45 PM)      It looks like there is already an order that Dr. Dias put in for B/L hip pain. Just reschedule this order to prior to CT Myelogram please. Thanks!    Routing comment       TYREE Bell 2 hours ago (1:02 PM)      Yes, okay to order x-rays on same day as CT Myelogram.     Do I need to put these in?    Routing comment       You  Aissatou Rivero PA-C; Augustin Nichols PA-C 5 hours ago (10:15 AM)      For review. Please advise.     Routing comment       You  Rm Ojeda 5 hours ago (10:07 AM)      You 5 hours ago (10:07 AM)         Spoke w/ patient brother(Rm)he states sister is c/o moderate to severe bilateral hip pain x 1mth.      Patient brother is requesting to get orders for bilateral hip x-ray's and to possibly complete on same date as CT Myogram that's already scheduled.      Brother was informed I would discuss w/ TYREE to see what would be the suggested plan of care for patient.      He verbalized understanding.       ----- Message from Rusty Munguia sent at 7/9/2019  9:48 AM CDT -----  Contact: Pt/Brother (Rm0  Please give pt brother a call at [phone] (home) regarding some questions about the pt test that are coming up            Documentation

## 2019-07-09 NOTE — TELEPHONE ENCOUNTER
Spoke w/ patient brother(Rm)he states sister is c/o moderate to severe bilateral hip pain x 1mth.     Patient brother is requesting to get orders for bilateral hip x-ray's and to possibly complete on same date as CT Myogram that's already scheduled.     Brother was informed I would discuss w/ PA-C to see what would be the suggested plan of care for patient.     He verbalized understanding.      ----- Message from Rusty Munguia sent at 7/9/2019  9:48 AM CDT -----  Contact: Pt/Brother (Rm0  Please give pt brother a call at .[phone] (home) regarding some questions about the pt test that are coming up

## 2019-07-11 ENCOUNTER — TELEPHONE (OUTPATIENT)
Dept: NEUROSURGERY | Facility: CLINIC | Age: 74
End: 2019-07-11

## 2019-07-11 NOTE — TELEPHONE ENCOUNTER
Spoke w/ patient brother he wanted to schedule his sister a follow-up appt to discuss testing results further.     Appt scheduled as requested, brother verbalized understanding.      ----- Message from Kecia Stone sent at 7/11/2019 10:36 AM CDT -----  Contact: Rm Ojeda (Brother)  Caller would like nurse to contact him regarding pt, caller states he has questions regarding pt. Please contact Rm at ([phone])

## 2019-07-12 ENCOUNTER — HOSPITAL ENCOUNTER (EMERGENCY)
Facility: HOSPITAL | Age: 74
Discharge: HOME | End: 2019-07-12
Attending: EMERGENCY MEDICINE
Payer: MEDICARE

## 2019-07-12 ENCOUNTER — HOSPITAL ENCOUNTER (OUTPATIENT)
Dept: RADIOLOGY | Facility: HOSPITAL | Age: 74
Discharge: HOME | End: 2019-07-12
Attending: NEUROLOGICAL SURGERY
Payer: MEDICARE

## 2019-07-12 ENCOUNTER — TELEPHONE (OUTPATIENT)
Dept: NEUROSURGERY | Facility: CLINIC | Age: 74
End: 2019-07-12

## 2019-07-12 ENCOUNTER — HOSPITAL ENCOUNTER (OUTPATIENT)
Dept: RADIOLOGY | Facility: HOSPITAL | Age: 74
Discharge: HOME | End: 2019-07-12
Attending: PAIN MEDICINE
Payer: MEDICARE

## 2019-07-12 VITALS
RESPIRATION RATE: 20 BRPM | TEMPERATURE: 98 F | HEIGHT: 64 IN | WEIGHT: 181 LBS | SYSTOLIC BLOOD PRESSURE: 103 MMHG | OXYGEN SATURATION: 96 % | DIASTOLIC BLOOD PRESSURE: 58 MMHG | BODY MASS INDEX: 30.9 KG/M2 | HEART RATE: 81 BPM

## 2019-07-12 DIAGNOSIS — M48.062 SPINAL STENOSIS OF LUMBAR REGION WITH NEUROGENIC CLAUDICATION: ICD-10-CM

## 2019-07-12 DIAGNOSIS — R07.9 CHEST PAIN: ICD-10-CM

## 2019-07-12 DIAGNOSIS — M25.552 HIP PAIN, BILATERAL: ICD-10-CM

## 2019-07-12 DIAGNOSIS — M51.26 LUMBAR HERNIATED DISC: Primary | ICD-10-CM

## 2019-07-12 DIAGNOSIS — M25.551 HIP PAIN, BILATERAL: ICD-10-CM

## 2019-07-12 LAB
ALBUMIN SERPL BCP-MCNC: 3.2 G/DL (ref 3.5–5.2)
ALP SERPL-CCNC: 47 U/L (ref 55–135)
ALT SERPL W/O P-5'-P-CCNC: 19 U/L (ref 10–44)
ANION GAP SERPL CALC-SCNC: 12 MMOL/L (ref 8–16)
AST SERPL-CCNC: 19 U/L (ref 10–40)
BASOPHILS # BLD AUTO: 0.01 K/UL (ref 0–0.2)
BASOPHILS NFR BLD: 0.1 % (ref 0–1.9)
BILIRUB SERPL-MCNC: 0.4 MG/DL (ref 0.1–1)
BUN SERPL-MCNC: 13 MG/DL (ref 8–23)
CALCIUM SERPL-MCNC: 10.1 MG/DL (ref 8.7–10.5)
CHLORIDE SERPL-SCNC: 111 MMOL/L (ref 95–110)
CO2 SERPL-SCNC: 19 MMOL/L (ref 23–29)
CREAT SERPL-MCNC: 0.9 MG/DL (ref 0.5–1.4)
DIFFERENTIAL METHOD: ABNORMAL
EOSINOPHIL # BLD AUTO: 0 K/UL (ref 0–0.5)
EOSINOPHIL NFR BLD: 0 % (ref 0–8)
ERYTHROCYTE [DISTWIDTH] IN BLOOD BY AUTOMATED COUNT: 14.8 % (ref 11.5–14.5)
EST. GFR  (AFRICAN AMERICAN): >60 ML/MIN/1.73 M^2
EST. GFR  (NON AFRICAN AMERICAN): >60 ML/MIN/1.73 M^2
GLUCOSE SERPL-MCNC: 150 MG/DL (ref 70–110)
HCT VFR BLD AUTO: 35.5 % (ref 37–48.5)
HGB BLD-MCNC: 11.7 G/DL (ref 12–16)
LYMPHOCYTES # BLD AUTO: 0.5 K/UL (ref 1–4.8)
LYMPHOCYTES NFR BLD: 6.6 % (ref 18–48)
MCH RBC QN AUTO: 30.8 PG (ref 27–31)
MCHC RBC AUTO-ENTMCNC: 33 G/DL (ref 32–36)
MCV RBC AUTO: 93 FL (ref 82–98)
MONOCYTES # BLD AUTO: 0.2 K/UL (ref 0.3–1)
MONOCYTES NFR BLD: 2.4 % (ref 4–15)
NEUTROPHILS # BLD AUTO: 6.9 K/UL (ref 1.8–7.7)
NEUTROPHILS NFR BLD: 92.4 % (ref 38–73)
PLATELET # BLD AUTO: 263 K/UL (ref 150–350)
PMV BLD AUTO: 8.8 FL (ref 9.2–12.9)
POTASSIUM SERPL-SCNC: 3.8 MMOL/L (ref 3.5–5.1)
PROT SERPL-MCNC: 7 G/DL (ref 6–8.4)
RBC # BLD AUTO: 3.8 M/UL (ref 4–5.4)
SODIUM SERPL-SCNC: 142 MMOL/L (ref 136–145)
WBC # BLD AUTO: 7.55 K/UL (ref 3.9–12.7)

## 2019-07-12 PROCEDURE — 96375 TX/PRO/DX INJ NEW DRUG ADDON: CPT

## 2019-07-12 PROCEDURE — 72129 CT CHEST SPINE W/DYE: CPT | Mod: TC

## 2019-07-12 PROCEDURE — 96372 THER/PROPH/DIAG INJ SC/IM: CPT | Mod: 59

## 2019-07-12 PROCEDURE — 63600175 PHARM REV CODE 636 W HCPCS: Performed by: EMERGENCY MEDICINE

## 2019-07-12 PROCEDURE — 73521 XR HIPS BILATERAL 2 VIEW INCL AP PELVIS: ICD-10-PCS | Mod: 26,,, | Performed by: RADIOLOGY

## 2019-07-12 PROCEDURE — 99284 EMERGENCY DEPT VISIT MOD MDM: CPT | Mod: 25

## 2019-07-12 PROCEDURE — 96376 TX/PRO/DX INJ SAME DRUG ADON: CPT | Mod: 59

## 2019-07-12 PROCEDURE — 93005 ELECTROCARDIOGRAM TRACING: CPT

## 2019-07-12 PROCEDURE — 93010 ELECTROCARDIOGRAM REPORT: CPT | Mod: ,,, | Performed by: INTERNAL MEDICINE

## 2019-07-12 PROCEDURE — 73521 X-RAY EXAM HIPS BI 2 VIEWS: CPT | Mod: TC

## 2019-07-12 PROCEDURE — 36415 COLL VENOUS BLD VENIPUNCTURE: CPT

## 2019-07-12 PROCEDURE — 62305 MYELOGRAPHY LUMBAR INJECTION: CPT

## 2019-07-12 PROCEDURE — 93010 EKG 12-LEAD: ICD-10-PCS | Mod: ,,, | Performed by: INTERNAL MEDICINE

## 2019-07-12 PROCEDURE — 85025 COMPLETE CBC W/AUTO DIFF WBC: CPT

## 2019-07-12 PROCEDURE — 80053 COMPREHEN METABOLIC PANEL: CPT

## 2019-07-12 PROCEDURE — 25500020 PHARM REV CODE 255: Performed by: NEUROLOGICAL SURGERY

## 2019-07-12 PROCEDURE — 96374 THER/PROPH/DIAG INJ IV PUSH: CPT

## 2019-07-12 PROCEDURE — 73521 X-RAY EXAM HIPS BI 2 VIEWS: CPT | Mod: 26,,, | Performed by: RADIOLOGY

## 2019-07-12 RX ORDER — OXYCODONE AND ACETAMINOPHEN 5; 325 MG/1; MG/1
1 TABLET ORAL EVERY 4 HOURS PRN
Qty: 30 TABLET | Refills: 0 | Status: SHIPPED | OUTPATIENT
Start: 2019-07-12 | End: 2019-07-22 | Stop reason: SDUPTHER

## 2019-07-12 RX ORDER — HYDROMORPHONE HYDROCHLORIDE 2 MG/ML
1 INJECTION, SOLUTION INTRAMUSCULAR; INTRAVENOUS; SUBCUTANEOUS
Status: COMPLETED | OUTPATIENT
Start: 2019-07-12 | End: 2019-07-12

## 2019-07-12 RX ORDER — NAPROXEN 375 MG/1
375 TABLET ORAL 2 TIMES DAILY WITH MEALS
Qty: 60 TABLET | Refills: 0 | Status: ON HOLD | OUTPATIENT
Start: 2019-07-12 | End: 2019-08-07 | Stop reason: HOSPADM

## 2019-07-12 RX ORDER — ONDANSETRON 4 MG/1
4 TABLET, FILM COATED ORAL EVERY 6 HOURS
Qty: 30 TABLET | Refills: 0 | Status: ON HOLD | OUTPATIENT
Start: 2019-07-12 | End: 2019-08-07 | Stop reason: SDUPTHER

## 2019-07-12 RX ORDER — DEXAMETHASONE SODIUM PHOSPHATE 4 MG/ML
8 INJECTION, SOLUTION INTRA-ARTICULAR; INTRALESIONAL; INTRAMUSCULAR; INTRAVENOUS; SOFT TISSUE
Status: COMPLETED | OUTPATIENT
Start: 2019-07-12 | End: 2019-07-12

## 2019-07-12 RX ORDER — ONDANSETRON 2 MG/ML
4 INJECTION INTRAMUSCULAR; INTRAVENOUS
Status: COMPLETED | OUTPATIENT
Start: 2019-07-12 | End: 2019-07-12

## 2019-07-12 RX ADMIN — HYDROMORPHONE HYDROCHLORIDE 1 MG: 2 INJECTION, SOLUTION INTRAMUSCULAR; INTRAVENOUS; SUBCUTANEOUS at 02:07

## 2019-07-12 RX ADMIN — HYDROMORPHONE HYDROCHLORIDE 1 MG: 2 INJECTION, SOLUTION INTRAMUSCULAR; INTRAVENOUS; SUBCUTANEOUS at 04:07

## 2019-07-12 RX ADMIN — ONDANSETRON 4 MG: 2 INJECTION INTRAMUSCULAR; INTRAVENOUS at 02:07

## 2019-07-12 RX ADMIN — DEXAMETHASONE SODIUM PHOSPHATE 8 MG: 4 INJECTION, SOLUTION INTRA-ARTICULAR; INTRALESIONAL; INTRAMUSCULAR; INTRAVENOUS; SOFT TISSUE at 05:07

## 2019-07-12 RX ADMIN — IOHEXOL 10 ML: 300 INJECTION, SOLUTION INTRAVENOUS at 01:07

## 2019-07-12 NOTE — ED PROVIDER NOTES
SCRIBE #1 NOTE: I, Colleen Diaz, am scribing for, and in the presence of, Teri Wilkins Do, MD. I have scribed the entire note.       History     Chief Complaint   Patient presents with    Back Pain     pain to back and hips x 3 weeks, worsening; just had myelogram and CT with Dr. Olson     Review of patient's allergies indicates:   Allergen Reactions    Doxycycline Nausea Only     Unable to tolerate medication  Unable to tolerate medication  Unable to tolerate medication  Unable to tolerate medication  Unable to tolerate medication      Penicillins Anaphylaxis, Hives, Shortness Of Breath and Swelling    Oxycodone Other (See Comments)     sleepy  sleepy  sleepy  sleepy  sleepy      Adhesive Rash    Iodine and iodide containing products Rash     Patient denies allergy to IV dye and has had multiple CT studies and a heart cath without allergy to IV contrast or premedication.  She states allergy was to Betadine.  Patient denies allergy to IV dye and has had multiple CT studies and a heart cath without allergy to IV contrast or premedication.  She states allergy was to Betadine.      Povidone-iodine Rash    Sulfa (sulfonamide antibiotics) Itching         History of Present Illness     HPI    7/12/2019, 2:56 PM  History obtained from the patient      History of Present Illness: Shireen Felix is a 73 y.o. female patient with a PMHx of COPD, DM, glaucoma, HTN, seizures, and stroke who presents to the Emergency Department for evaluation of back pain which onset gradually 3 weeks ago. Pt had a CT myelography for spinal stenosis of lumbar region with neurogenic claudication ordered by Dr. Olson (Neurosurgery) a few hours ago. Pt reports she is in a lot of pain and is not able to walk. Symptoms are constant and moderate in severity. She reports worsening pain over the last 4 days. No mitigating or exacerbating factors reported. Associated sxs include bilateral hip pain. Patient denies any fever, chills,  SOB, CP, n/v, dysuria, urinary frequency/urgency, neck pain, neck stiffness, dizziness, extremity weakness/numbness/tingling, and all other sxs at this time. No prior Tx reported. Pt reports an elevated HR when No further complaints or concerns at this time.       Arrival mode: Personal vehicle    PCP: Jossie Ac MD        Past Medical History:  Past Medical History:   Diagnosis Date    Anticoagulant long-term use     COPD (chronic obstructive pulmonary disease)     Diabetes mellitus     Glaucoma     Hypertension     Seizures     Shingles 05/27/2017    Stroke        Past Surgical History:  Past Surgical History:   Procedure Laterality Date    BRAIN SURGERY      HYSTERECTOMY           Family History:  History reviewed. No pertinent family history.    Social History:  Social History     Tobacco Use    Smoking status: Former Smoker     Packs/day: 1.00     Types: Cigarettes     Last attempt to quit: 4/11/2004     Years since quitting: 15.2    Smokeless tobacco: Never Used   Substance and Sexual Activity    Alcohol use: No    Drug use: Never    Sexual activity: Unknown        Review of Systems     Review of Systems   Constitutional: Negative for chills and fever.   HENT: Negative for rhinorrhea and sore throat.    Respiratory: Negative for cough and shortness of breath.    Cardiovascular: Negative for chest pain and leg swelling.   Gastrointestinal: Negative for abdominal pain, diarrhea, nausea and vomiting.   Genitourinary: Negative for dysuria, frequency and urgency.   Musculoskeletal: Positive for arthralgias (bilateral hip), back pain and gait problem. Negative for neck pain and neck stiffness.   Skin: Negative for rash.   Neurological: Negative for dizziness, weakness and numbness (tingling).   Hematological: Does not bruise/bleed easily.   All other systems reviewed and are negative.       Physical Exam     Initial Vitals [07/12/19 1410]   BP Pulse Resp Temp SpO2   119/81 (!) 131 18 97.8 °F  "(36.6 °C) 96 %      MAP       --          Physical Exam  Nursing Notes and Vital Signs Reviewed.  Constitutional: Patient is in no acute distress. Well-developed and well-nourished. Appears as stated age.  Head: Atraumatic. Normocephalic.  Eyes: EOM intact. Conjunctivae are not pale. No scleral icterus.  ENT: Mucous membranes are moist. Oropharynx is clear and symmetric.    Neck: Supple. Full ROM. No lymphadenopathy.  Cardiovascular: Regular rate. Regular rhythm. No murmurs, rubs, or gallops. Distal pulses are 2+ and symmetric.  Pulmonary/Chest: No respiratory distress. Clear to auscultation bilaterally. No wheezing or rales.  Abdominal: Soft and non-distended.  There is no tenderness.  No rebound, guarding, or rigidity.   Musculoskeletal: No obvious deformities. No edema. No calf tenderness.  Back: Pt does not want to be moved at this time due to pain.   Skin: Warm and dry.  Neurological:  Alert, awake, and appropriate.  Normal speech.  No light touch sensory deficit. DTRs 2+ and equal. No acute focal neurological deficits are appreciated.  Psychiatric: Normal affect. Good eye contact. Appropriate in content.     ED Course   Procedures  ED Vital Signs:  Vitals:    07/12/19 1410 07/12/19 1445 07/12/19 1525 07/12/19 1530   BP: 119/81 133/70 118/66    Pulse: (!) 131 (!) 129 (!) 118    Resp: 18 18 16    Temp: 97.8 °F (36.6 °C)      TempSrc: Oral      SpO2: 96% 100% 95%    Weight:    82.1 kg (181 lb)   Height:    5' 4" (1.626 m)    07/12/19 1631 07/12/19 1701   BP: (!) 110/59 (!) 103/58   Pulse: 91 81   Resp: 20 20   Temp:     TempSrc:     SpO2: 96% 96%   Weight:     Height:         Abnormal Lab Results:  Labs Reviewed   CBC W/ AUTO DIFFERENTIAL - Abnormal; Notable for the following components:       Result Value    RBC 3.80 (*)     Hemoglobin 11.7 (*)     Hematocrit 35.5 (*)     RDW 14.8 (*)     MPV 8.8 (*)     Lymph # 0.5 (*)     Mono # 0.2 (*)     Gran% 92.4 (*)     Lymph% 6.6 (*)     Mono% 2.4 (*)     All other " components within normal limits   COMPREHENSIVE METABOLIC PANEL - Abnormal; Notable for the following components:    Chloride 111 (*)     CO2 19 (*)     Glucose 150 (*)     Albumin 3.2 (*)     Alkaline Phosphatase 47 (*)     All other components within normal limits        All Lab Results:  Results for orders placed or performed during the hospital encounter of 07/12/19   CBC auto differential   Result Value Ref Range    WBC 7.55 3.90 - 12.70 K/uL    RBC 3.80 (L) 4.00 - 5.40 M/uL    Hemoglobin 11.7 (L) 12.0 - 16.0 g/dL    Hematocrit 35.5 (L) 37.0 - 48.5 %    Mean Corpuscular Volume 93 82 - 98 fL    Mean Corpuscular Hemoglobin 30.8 27.0 - 31.0 pg    Mean Corpuscular Hemoglobin Conc 33.0 32.0 - 36.0 g/dL    RDW 14.8 (H) 11.5 - 14.5 %    Platelets 263 150 - 350 K/uL    MPV 8.8 (L) 9.2 - 12.9 fL    Gran # (ANC) 6.9 1.8 - 7.7 K/uL    Lymph # 0.5 (L) 1.0 - 4.8 K/uL    Mono # 0.2 (L) 0.3 - 1.0 K/uL    Eos # 0.0 0.0 - 0.5 K/uL    Baso # 0.01 0.00 - 0.20 K/uL    Gran% 92.4 (H) 38.0 - 73.0 %    Lymph% 6.6 (L) 18.0 - 48.0 %    Mono% 2.4 (L) 4.0 - 15.0 %    Eosinophil% 0.0 0.0 - 8.0 %    Basophil% 0.1 0.0 - 1.9 %    Differential Method Automated    Comprehensive metabolic panel   Result Value Ref Range    Sodium 142 136 - 145 mmol/L    Potassium 3.8 3.5 - 5.1 mmol/L    Chloride 111 (H) 95 - 110 mmol/L    CO2 19 (L) 23 - 29 mmol/L    Glucose 150 (H) 70 - 110 mg/dL    BUN, Bld 13 8 - 23 mg/dL    Creatinine 0.9 0.5 - 1.4 mg/dL    Calcium 10.1 8.7 - 10.5 mg/dL    Total Protein 7.0 6.0 - 8.4 g/dL    Albumin 3.2 (L) 3.5 - 5.2 g/dL    Total Bilirubin 0.4 0.1 - 1.0 mg/dL    Alkaline Phosphatase 47 (L) 55 - 135 U/L    AST 19 10 - 40 U/L    ALT 19 10 - 44 U/L    Anion Gap 12 8 - 16 mmol/L    eGFR if African American >60 >60 mL/min/1.73 m^2    eGFR if non African American >60 >60 mL/min/1.73 m^2       Imaging Results:  Imaging Results    None       Details     Reading Physician Reading Date Result Priority   Brody Willis MD 7/12/2019  Routine      Narrative     EXAMINATION:  FL MYELOGRAM 2 OR MORE REGIONS    CLINICAL HISTORY:  Spinal stenosis, lumbar region with neurogenic claudication    TECHNIQUE:  After obtaining informed consent from the patient explain the risks, benefits and alternatives to the procedure the patient was placed on the fluoroscopic table in the prone position. The risks included although not limited to bleeding, infection, contrast/drug reaction, nerve root/cord injury,  CSF leak/spinal headache, seizures and herniation. The L2/3 level within isolated under fluoroscopy. The overlying skin was prepped and draped routine sterile fashion. Approximately 1 cc of a 1% lidocaine was used for local anesthesia. Using a 22-gauge spinal needle access was obtained to the thecal sac be a midline translaminar approach. Following removal of stylet spontaneous visualization of clear cerebrospinal fluid was identified. Following this contrast system was attached and approximately 10 cc of a Omnipaque 300 contrast material was infused under fluoroscopy. Patient tolerated procedure well. No immediate postprocedure complication. The stylet was then reinserted and the  spinal needle system was removed in full. Adhesive bandage was placed over the puncture site. Spot overhead fluoroscopic images in the and lateral projection with additional lateral flexion and extension views.    Total fluoro time was 2.1 minutes and 1 fluoroscopic image was obtained.    COMPARISON:  06/04/2019    FINDINGS:  No complications.  Limited images due to limited mobility.      Impression       Successful fluoroscopic myelogram. See dedicated CT myelogram thoracic and lumbar spine.      Electronically signed by: Brody Willis MD  Date: 07/12/2019  Time: 14:40     Details     Reading Physician Reading Date Result Priority   David Wallace DO 6/12/2019 Routine      Narrative     EXAMINATION:  CT LUMBAR SPINE WITHOUT CONTRAST    CLINICAL HISTORY:  Low back pain, >6wks  conservative tx, persistent-progressive sx, surgical candidate;  Radiculopathy, lumbar region    TECHNIQUE:  Low-dose axial, sagittal and coronal reformations are obtained through the lumbar spine.  Contrast was not administered.    COMPARISON:  None.    FINDINGS:  There is a chronic wedge deformity at the T12 level with approximately 40% vertebral body height loss noted anteriorly.  There is mild levoscoliosis of the lumbar spine.  There is grade 1 spondylolisthesis of L3 on L4.  There is moderate to severe disc space narrowing and spondylosis noted at the L3-4 through the L5-S1 levels.  Vacuum disc phenomenon also noted at these levels.  More moderate disc space narrowing with vacuum disc phenomenon noted at the L2-3 level.  No acute fractures are noted.  There is significant vascular calcification seen involving the aorta.    At the L2-3 level, there is a diffuse disc bulge ligamentum flavum hypertrophy and mild bilateral facet arthropathy resulting in moderate narrowing of the central canal.  The bilateral neural foraminal canals are mildly narrowed right greater than the left.    At the L3-4 level, there is grade 1 spondylolisthesis along with a diffuse disc bulge mild-to-moderate bilateral facet arthropathy and ligamentum flavum hypertrophy resulting in severe narrowing of the central canal.  The lateral recesses are significantly narrowed bilaterally.  The right neural foraminal canal is severely narrowed with effacement of the L3 nerve root by disc material.  The left neural foraminal canal is mildly to moderately narrowed.    At the L4-5 level, there is a diffuse disc bulge and mild-to-moderate bilateral facet arthropathy left greater than the right and ligamentum flavum hypertrophy resulting in severe narrowing of the central canal and significant narrowing of either lateral recess.  The right neural foraminal canal is moderately narrowed.  The left neural foraminal canal is moderately to severely  narrowed.    At the L5-S1 level, there is a diffuse disc bulge ligamentum flavum hypertrophy and mild bilateral facet arthropathy resulting in moderate narrowing of the central canal and moderate to severe narrowing of the left neural foraminal canal.  The right neural foraminal canal is mildly narrowed.      Impression       1. Significant degenerative changes of the lumbar spine as detailed above and greatest at the L3-4 and L4-5 levels.  2. Chronic compression deformity noted at the T12 level.      Electronically signed by: David Wallace DO  Date: 06/12/2019  Time: 13:09       The EKG was ordered, reviewed, and independently interpreted by the ED provider.  Interpretation time: 1504  Rate: 123 BPM  Rhythm: sinus tachycardia  Interpretation: Possible left atrial enlargement. LAD. Septal infarct. No STEMI.           The Emergency Provider reviewed the vital signs and test results, which are outlined above.     ED Discussion     4:44 PM: Discussed pt's case with Dr. Olson (Neurosurgery) who recommends d/c pt with pain control and she can f/u next week.    4:49 PM: Reassessed pt at this time. Discussed with pt all pertinent ED information and results. Discussed pt dx and plan of tx. Gave pt all f/u and return to the ED instructions. Will d/c pt with percocet. Pt's allergic rx to Percocet is usually vomiting. The plan is for pt to take Zofran prior to taking Percocet which pt states she has never tried before. All questions and concerns were addressed at this time. Pt expresses understanding of information and instructions, and is comfortable with plan to discharge. Pt is stable for discharge.    I discussed with patient and/or family/caretaker that evaluation in the ED does not suggest any emergent or life threatening medical conditions requiring immediate intervention beyond what was provided in the ED, and I believe patient is safe for discharge.  Regardless, an unremarkable evaluation in the ED does not preclude  the development or presence of a serious of life threatening condition. As such, patient was instructed to return immediately for any worsening or change in current symptoms.        ED Medication(s):  Medications   ondansetron injection 4 mg (4 mg Intravenous Given 7/12/19 1443)   hydromorphone (PF) injection 1 mg (1 mg Intravenous Given 7/12/19 1444)   hydromorphone (PF) injection 1 mg (1 mg Intravenous Given 7/12/19 1632)   dexamethasone injection 8 mg (8 mg Intramuscular Given 7/12/19 1707)       Discharge Medication List as of 7/12/2019  4:56 PM      START taking these medications    Details   naproxen (NAPROSYN) 375 MG tablet Take 1 tablet (375 mg total) by mouth 2 (two) times daily with meals., Starting Fri 7/12/2019, Print      ondansetron (ZOFRAN) 4 MG tablet Take 1 tablet (4 mg total) by mouth every 6 (six) hours., Starting Fri 7/12/2019, Print      oxyCODONE-acetaminophen (PERCOCET) 5-325 mg per tablet Take 1 tablet by mouth every 4 (four) hours as needed for Pain., Starting Fri 7/12/2019, Print             Follow-up Information     Jossie Ac MD In 2 days.    Specialty:  Family Medicine  Contact information:  [address]  [place] [address] [phone]             Everardo Olson MD In 3 days.    Specialty:  Neurosurgery  Contact information:  [address] [place] [address] [phone]                         Medical Decision Making:   Clinical Tests:   Lab Tests: Ordered and Reviewed  Radiological Study: Reviewed             Scribe Attestation:   Scribe #1: I performed the above scribed service and the documentation accurately describes the services I performed. I attest to the accuracy of the note.     Attending:   Physician Attestation Statement for Scribe #1: I, Teri Wilkins Do, MD, personally performed the services described in this documentation, as scribed by Colleen Diaz, in my presence, and it is both accurate and complete.           Clinical Impression        ICD-10-CM ICD-9-CM   1. Lumbar herniated disc M51.26 722.10   2. Chest pain R07.9 786.50       Disposition:   Disposition: Discharged  Condition: Stable         Teri Wilkins Do, MD  07/12/19 3324

## 2019-07-12 NOTE — TELEPHONE ENCOUNTER
Left message in reference to the message below and left call back number//ns    ----- Message from Adriana Andrez sent at 7/12/2019  2:00 PM CDT -----  Contact: Rm/brother  Rm stated that she is done taken her test, Please call him at [phone].    Thanks  Td

## 2019-07-12 NOTE — TELEPHONE ENCOUNTER
Left message in reference to the message below and left call back number//ns     ----- Message from Delroy Caleb sent at 7/12/2019  1:48 PM CDT -----  Contact: Rm Ojeda   States he was told to call back once sister was out of test. \[facility]\"" return call.             [phone]

## 2019-07-12 NOTE — TELEPHONE ENCOUNTER
Spoke with brother stated pt is at the hospital for CT myelogram, pt is in a lot a pain not able to walk and needed two people to help out of car stated pt has been having this increasing pain for the last 4 days, informed pt I will inform the Provider of the above//ns

## 2019-07-12 NOTE — ED NOTES
Pt lying in bed in NAD,VSS,RR equal and unlabored. Bed is low, locked, and call light in reach. Side rails up x 2.  Family at bedside.

## 2019-07-12 NOTE — ED NOTES
Discharge instructions explained to patient. Patient verbalizes understanding. Patient and discharge paperwork escorted to registration desk by RN at this time. Discharge paperwork given to registration for completion of discharge.   Brother with pt for discharge.

## 2019-07-12 NOTE — TELEPHONE ENCOUNTER
1:40 PM  I called and spoke to Rm Ojeda in ref to Shireen Felix.   Patient currently undergoing CT Myelogram and was unable to speak to me. Her brother reported that patient is in a lot of pain and can't walk w/out assistance.   He was unsure to provide a report regarding weakness, numbness, bladder or bowel incontinence.   I offered to order pain medications for her because he was insisting she go to the ER to be admitted for pain control.   Informed him that we will review CT myelogram (if we have access to do so over today/weekend) and call with results. Patient's brother states he will call us later regarding the pain medication.  Obviously if something is abnormal from imaging study and requires urgent surgical intervention we will inform them ASAP.     Augustin Nichols PA-C

## 2019-07-15 ENCOUNTER — TELEPHONE (OUTPATIENT)
Dept: NEUROSURGERY | Facility: CLINIC | Age: 74
End: 2019-07-15

## 2019-07-15 ENCOUNTER — OFFICE VISIT (OUTPATIENT)
Dept: NEUROSURGERY | Facility: CLINIC | Age: 74
End: 2019-07-15
Payer: MEDICARE

## 2019-07-15 VITALS
DIASTOLIC BLOOD PRESSURE: 65 MMHG | WEIGHT: 181 LBS | RESPIRATION RATE: 16 BRPM | SYSTOLIC BLOOD PRESSURE: 97 MMHG | HEART RATE: 85 BPM | BODY MASS INDEX: 30.9 KG/M2 | HEIGHT: 64 IN

## 2019-07-15 DIAGNOSIS — M51.36 DEGENERATIVE DISC DISEASE, LUMBAR: ICD-10-CM

## 2019-07-15 DIAGNOSIS — M48.062 SPINAL STENOSIS OF LUMBAR REGION WITH NEUROGENIC CLAUDICATION: Primary | ICD-10-CM

## 2019-07-15 DIAGNOSIS — M54.16 LUMBAR RADICULOPATHY: ICD-10-CM

## 2019-07-15 PROCEDURE — 99999 PR PBB SHADOW E&M-EST. PATIENT-LVL V: CPT | Mod: PBBFAC,,, | Performed by: PHYSICIAN ASSISTANT

## 2019-07-15 PROCEDURE — 99999 PR PBB SHADOW E&M-EST. PATIENT-LVL V: ICD-10-PCS | Mod: PBBFAC,,, | Performed by: PHYSICIAN ASSISTANT

## 2019-07-15 PROCEDURE — 99214 OFFICE O/P EST MOD 30 MIN: CPT | Mod: S$PBB,,, | Performed by: PHYSICIAN ASSISTANT

## 2019-07-15 PROCEDURE — 99215 OFFICE O/P EST HI 40 MIN: CPT | Mod: PBBFAC,PO | Performed by: PHYSICIAN ASSISTANT

## 2019-07-15 PROCEDURE — 99214 PR OFFICE/OUTPT VISIT, EST, LEVL IV, 30-39 MIN: ICD-10-PCS | Mod: S$PBB,,, | Performed by: PHYSICIAN ASSISTANT

## 2019-07-15 NOTE — PROGRESS NOTES
Subjective:      Patient ID: Shireen Felix is a 73 y.o. female.    Chief Complaint: Follow-up (CT, X-ray, Spinal Stenosis of Lumbar )    HPI     Patient is seen today for follow-up regarding recent CT myelogram. Patient states that since her last office visit her pain has worsened tremendously.  She started using a wheelchair last week. She has numbness and weakness of both legs. Her pain was worse in LLE at onset but now she feels it equally in both LE. Patient states she is in too much pain to differentiate where the pain stops. Her pain is worse with extension.     She has had that for a number of years getting worse over the past month.  She has had no history of prior lumbar surgery.  She has had bilateral hip replacements in the past.  She cannot have a MRI because she has had a history of aneurysm clip bilaterally in 2004.  She has had an injection with pain management several years ago (at least 8 while in [place]) which helped. Patient rates her current pain an 8/10.     Review of Systems   Constitution: Negative for fever.   HENT: Negative for congestion.    Respiratory: Negative for cough and wheezing.    Skin: Negative for poor wound healing.   Musculoskeletal: Positive for back pain, muscle weakness and myalgias. Negative for falls.   Gastrointestinal: Negative for abdominal pain, bowel incontinence, diarrhea, nausea and vomiting.   Genitourinary: Negative for bladder incontinence.   Neurological: Positive for numbness. Negative for seizures.   Psychiatric/Behavioral: Negative for altered mental status.         Objective:            Ortho/SPM Exam    Nursing note and vitals reviewed  Gen:Oriented to person, place, and time.             Appears stated age   Head:Normocephalic and atraumatic.  Nose: Nose normal.    Eyes: EOM are normal. Pupils are equal, round, and reactive to light.   Neck: Neck supple. No masses or lesions palpated  Cardiovascular: Intact distal pulses.    Abdominal: Soft.    Neurological: Alert and oriented to person, place, and time.  No cranial nerve deficit.  Coordination normal. Normal muscle tone  Psychiatric: Normal mood and affect. Behavior is normal.      Gait: Unable to assess, patient states she needs assistance with walking    Antalgic     Broad based    Yes, WC Assistive devices     Able to walk on toes and heels without difficulty      180 degree turn with less than 3 steps       Back:   Present, bilateral lower l-spine Paraspinal tenderness    Yes Paraspinal muscle spasms    Increased pain going from flex-> extension Pain with flexion and extention    Decreased and limited secondary to pain Range of motion     +, Emily LE Straight leg raise     Motor:   Right Right Left Left  Level Group   5 4 5 4 L2 Hip flexor (Psoas)   5 4 5 4 L3 Leg extension (Quads)   5 5 5 4 L4 Dorsiflexion & foot inversion (Tibialis Anterior)   5 5 5 4 L5 Great toe extension ( EHL)   5 5 5 4 S1 Foot eversion (Gastroc, PL & PB)     Sensation: intact to light touch  NL Decreased (R/L/BL) Level Sensation    X  L2 Anterio-medial thigh   X  L3 Medial thigh around knee   X  L4 Medial foot   X  L5 Dorsum foot   X  S1 Lateral foot     Reflex:  2+  Patellar tendon (L4)   2+  Achilles tendon (S1)                 Imaging:    Narrative     EXAMINATION:  CT MYELOGRAPHY THORACIC LUMBAR SPINE    CLINICAL HISTORY:  Presurgical evaluation, thoracic spine;  Spinal stenosis, lumbar region with neurogenic claudication    TECHNIQUE:  The thoracic and lumbar spine MRI was scanned after the intrathecal administration of contrast.  Multiplanar reformats were reviewed.    COMPARISON:  None    FINDINGS:  Thoracic spine:    There is no acute fracture of the thoracic spine.  There is an old anterior wedge compression fracture of the T12 vertebral body.  There is a Schmorl's node seen within the superior aspect of the T8 vertebral body.  There is a slight levoscoliosis of the thoracic spine centered at T3.    T1-T2:  Unremarkable.    T2-T3: Unremarkable.    T3-T4: There is a disc bulge with left ventral surface thecal sac effacement but no impingement upon the cord.    T4-T5: Unremarkable.    T5-T6: Unremarkable.    T6-T7: There is a central focal disc protrusion which causes right ventral surface cord impingement.  The disc protrusion measures 7 mm in AP dimension, 14 mm in craniocaudal dimension, and 10 mm in transverse dimension.    T7-T8: Unremarkable.    T8-T9: There is a central disc extrusion with the disc extrusion extending inferiorly from the level of the disc and measuring 19 mm in craniocaudal dimension, 8 mm in transverse dimension, and 5 mm in anterior-posterior dimension.  This disc extrusion slightly impinges upon the ventral surface of the cord.    T9-T10: Unremarkable.    T10-T11: There is a disc extrusion in the central to right central location which is extending superiorly from the disc level and measuring 18 mm in craniocaudal dimension, 5 mm in anterior-posterior dimension, and 8 mm in transverse dimension.  This disc extrusion impinges upon the right ventral surface of the cord.    T11-T12: Minimal disc bulge with ventral surface thecal sac effacement but no cord impingement.    T12-L1: Right central disc protrusion measuring 10 mm in craniocaudal dimension, 8 mm in transverse dimension, and 5 mm in anterior-posterior dimension which causes slight right ventral surface cord remodeling.    Lumbar spine:    No fracture of the lumbar spine.  No paraspinal mass.  The conus medullaris has a normal morphology and terminates at the L1-L2 level.    L1-L2: Mild disc bulge with mild ventral surface thecal sac effacement.  Patent neural foramina.    L2-L3: Degenerative disc disease.  Disc bulge with severe central canal stenosis with the AP diameter at the point of maximal stenosis measuring 7 mm.  Mild bilateral neural foraminal narrowing.  Bilateral facet joint osteoarthritis.    L3-L4: Severe degenerative disc  disease.  There is critical central canal stenosis at this level with no contrast-enhanced CSF identified at the disc level.  There is severe crowding of the nerve roots in the compressed thecal sac at this level.  The disc protrusion extends into the right neural foramen causing severe right neural foraminal stenosis and probable impingement upon the exiting right L3 nerve root.    L4-5: Severe degenerative disc disease.  Disc bulge along with ligamentum flavum thickening which causes severe central canal stenosis with the AP diameter of the thecal sac measuring 8 mm the point of maximal stenosis.  The disc protrusion extends into the bilateral neural foramen causing severe right neural foraminal stenosis and moderate left neural foraminal stenosis.    L5-S1: Degenerative disc disease.  Disc bulge with mild central canal narrowing.  Mild to moderate left neural foraminal stenosis.      Impression       1. There is critical central canal stenosis at L3-L4 due to a disc bulge and ligamentum flavum thickening.  2. Severe right L3-L4 neural foraminal stenosis with probable impingement upon the exiting right L3 nerve root.  3. Severe central canal stenosis at L2-L3 and L4-L5 due to disc bulges.  4. Severe right L4-5 neural foraminal stenosis.  5. Multiple disc protrusions in the thoracic spine as detailed each level with some ventral surface cord impingement seen at the T6-T7, T8-T9, and T10-T11 levels.  All CT scans at this facility are performed  using dose modulation techniques as appropriate to performed exam including the following:  automated exposure control; adjustment of mA and/or kV according to the patients size (this includes techniques or standardized protocols for targeted exams where dose is matched to indication/reason for exam: i.e. extremities or head);  iterative reconstruction technique.       Narrative     EXAMINATION:  XR HIPS BILATERAL 2 VIEW INCL AP PELVIS    CLINICAL HISTORY:  hip pain;  Pain in  right hip    TECHNIQUE:  AP view of the pelvis and frogleg lateral views of both hips were performed.    COMPARISON:  None.    FINDINGS:  Degenerative changes of the lumbosacral spine and sacroiliac joints.  Bones are demineralized.    Bilateral total hip arthroplasty changes are noted.  No periprosthetic fracture or hardware complication.      Impression       As above         Assessment:       Encounter Diagnoses   Name Primary?    Spinal stenosis of lumbar region with neurogenic claudication Yes    Degenerative disc disease, lumbar     Lumbar radiculopathy           Plan:       Shireen was seen today for follow-up.    Diagnoses and all orders for this visit:    Spinal stenosis of lumbar region with neurogenic claudication    Degenerative disc disease, lumbar    Lumbar radiculopathy        Patient will be scheduled for TFESI L3-4 with Dr. Dias- message sent to his staff  Patient is on Plavix and will need to be instructed on when to d/c the medication prior to injection  Patient is on pain medication from ED and requests refill by Wednesday    She will Follow-up 2 weeks after injection with Dr. Whitney Nichols PA-C

## 2019-07-15 NOTE — TELEPHONE ENCOUNTER
Spoke with pt in regards to refill. Pt stated that mally had told them to give her a call once they figure out when the pt will need another refill. I informed pt that I will let justin know, and once/if the medication is refilled I will give them a call and inform them.----- Message from Bennett Talamantes sent at 7/15/2019 12:32 PM CDT -----  Contact: Brother-Rm Ojeda-[phone]  Would like to follow-up with nurse regarding patient's medication. Please call back at [phone].   Md Edin

## 2019-07-16 ENCOUNTER — TELEPHONE (OUTPATIENT)
Dept: INTERNAL MEDICINE | Facility: CLINIC | Age: 74
End: 2019-07-16

## 2019-07-16 ENCOUNTER — TELEPHONE (OUTPATIENT)
Dept: PAIN MEDICINE | Facility: CLINIC | Age: 74
End: 2019-07-16

## 2019-07-16 ENCOUNTER — TELEPHONE (OUTPATIENT)
Dept: NEUROSURGERY | Facility: CLINIC | Age: 74
End: 2019-07-16

## 2019-07-16 DIAGNOSIS — M54.16 LUMBAR RADICULOPATHY: Primary | ICD-10-CM

## 2019-07-16 NOTE — TELEPHONE ENCOUNTER
----- Message from Chayo Granados sent at 7/16/2019  8:47 AM CDT -----  ..Type:  Patient Returning Call    Who Called: ethan ( pt brother   Who Left Message for Patient:  Does the patient know what this is regarding?:pt injection   Would the patient rather a call back or a response via [facility]? Call back   Best Call Back Number:[phone]  Additional Information: ethan ( pt brother is requesting a call from nurse to discuss some information regarding pt injection.

## 2019-07-16 NOTE — TELEPHONE ENCOUNTER
Spoke with patient brother and informed him that I had spoke with [employer] and all they are waiting for is the approval from the insurance .

## 2019-07-16 NOTE — TELEPHONE ENCOUNTER
Spoke with pt brother and gave information for the cardiologist at the [facility] to get medical clearance. Gave pt's brother fax to have heart doctor fax ok to stop plavix. Pt's brother states pt has been off of plavix for awhile. Instructed brother importance of blood thinner for pt. Pt can not stop without prescribing doctor ok. Pt's brother will call cardiologist to have clearance faxed today and will try to get pt schedule asap. Will also send request to PCP  as pt has hx of CVA. All questions answered.

## 2019-07-16 NOTE — TELEPHONE ENCOUNTER
Spoke with pt brother, to inform him that Ms. Iyer has to be cleared by her insurance for her epidural before she can be scheduled. Pt brother stated that lucie in Dr. Dias office did not inform him of that, I did express my apologies.

## 2019-07-16 NOTE — TELEPHONE ENCOUNTER
----- Message from Priya Perdomo MA sent at 7/16/2019  8:23 AM CDT -----  Contact: mr smith-brother  Can schedule anytime was cleared for procedure off plavix several days   ----- Message -----  From: Harper Cummings  Sent: 7/16/2019   8:01 AM  To: Larissa Anton Staff    needs call back regarding injection date...[phone] (home)

## 2019-07-18 ENCOUNTER — TELEPHONE (OUTPATIENT)
Dept: PAIN MEDICINE | Facility: CLINIC | Age: 74
End: 2019-07-18

## 2019-07-18 NOTE — TELEPHONE ENCOUNTER
Contacted pt's brother. Dr. Kiko Verdugo MD, [facility]  Fax # [phone] ph: [phone] to heart doctor. Clearance to come off plavix for 7 days ok by  scanned in media for recent ct myelogram. Will schedule injection for now with clearance on file. Contacted pt. Injection scheduled. Pre injection instructions taught and mailed. Orders entered. Pt was able to verbalize all understanding. All questions answered.//lp

## 2019-07-18 NOTE — TELEPHONE ENCOUNTER
----- Message from Josiane Smallwood MA sent at 7/18/2019  4:59 PM CDT -----  Contact: xbxbein-[phone]      ----- Message -----  From: Tammy Engle  Sent: 7/18/2019   3:36 PM  To: Larissa Anton Staff    Type:  Patient Returning Call    Who Called:Florentino  Who Left Message for Patient:Radha  Does the patient know what this is regarding?:Procedure  Would the patient rather a call back or a response via [facility]? Call back   Best Call Back Number:[phone]  Additional Information:

## 2019-07-18 NOTE — TELEPHONE ENCOUNTER
----- Message from Michael rCistina MA sent at 7/18/2019  3:24 PM CDT -----  Contact: PATIENT   For review.   ----- Message -----  From: Laila Lackey  Sent: 7/18/2019   3:03 PM  To: Whitney Mcgee Staff    CALLING REGARDING WHEN WILL SHE BE SCHEDULED FOR INJECTION. PLEASE CALL PATIENT @ [phone]. THANKS, DEVIN

## 2019-07-19 ENCOUNTER — TELEPHONE (OUTPATIENT)
Dept: NEUROSURGERY | Facility: CLINIC | Age: 74
End: 2019-07-19

## 2019-07-19 NOTE — TELEPHONE ENCOUNTER
Spoke with pt brother in regards to being prescribed percocet and Zofran for the pt. I informed pt brother that we are in clinic today, but I will pass on message to Dr. Olson and f/u with a phone call with them. I also offered a f/u appt for the pt, but pt brother refused the offer and stated he just needs her medicines  - understood. I informed pt brother I will be sure to pass message along,

## 2019-07-22 ENCOUNTER — TELEPHONE (OUTPATIENT)
Dept: PAIN MEDICINE | Facility: CLINIC | Age: 74
End: 2019-07-22

## 2019-07-22 ENCOUNTER — TELEPHONE (OUTPATIENT)
Dept: NEUROSURGERY | Facility: CLINIC | Age: 74
End: 2019-07-22

## 2019-07-22 RX ORDER — OXYCODONE AND ACETAMINOPHEN 5; 325 MG/1; MG/1
1 TABLET ORAL
Qty: 30 TABLET | Refills: 0 | Status: ON HOLD | OUTPATIENT
Start: 2019-07-22 | End: 2019-08-07 | Stop reason: SDUPTHER

## 2019-07-22 RX ORDER — ONDANSETRON 4 MG/1
4 TABLET, ORALLY DISINTEGRATING ORAL EVERY 8 HOURS PRN
Qty: 20 TABLET | Refills: 0 | Status: ON HOLD | OUTPATIENT
Start: 2019-07-22 | End: 2019-08-07 | Stop reason: SDUPTHER

## 2019-07-22 NOTE — TELEPHONE ENCOUNTER
----- Message from Vonda Harris sent at 7/22/2019 12:22 PM CDT -----  Contact: Rm Ojeda  States he's calling regarding her procedure on tomorrow. He needs to see what time she needs be here. Please call Rm Ojeda at [phone]. Thank you

## 2019-07-22 NOTE — TELEPHONE ENCOUNTER
----- Message from Mabel Burdick sent at 7/22/2019  1:26 PM CDT -----  Contact: brother-serjio  Type:  Needs Medical Advice    Who Called: brother  Symptoms (please be specific): n/a   How long has patient had these symptoms: n/a  Pharmacy name and phone #: n/a  Would the patient rather a call back or a response via [facility]?  Call back  Best Call Back Number: [phone]  Additional Information: requesting call back regarding questions about appt. Pt has procedure on tomorrow and brother states its hard to move pt around due to the level of pain she's on.    Thanks,  Mabel Burdick

## 2019-07-22 NOTE — TELEPHONE ENCOUNTER
----- Message from Delroy Ellis sent at 7/22/2019  8:12 AM CDT -----  Contact: serjio smith   Would like cb to discuss procedure scheduled for 7/23. pls return call.     ..[phone]

## 2019-07-22 NOTE — TELEPHONE ENCOUNTER
Called left message to return call to discuss further, need to know exactly which medication we are speaking in reference to.     ----- Message from Vonda Harris sent at 7/22/2019 12:19 PM CDT -----  Contact: Rm Ojeda  States he needs to speak to Marlen regarding some medicine that was suppose to be called in. It was never called in. Please call Rm Ojeda at [phone]. Thank you

## 2019-07-22 NOTE — TELEPHONE ENCOUNTER
----- Message from Delroy Ellis sent at 7/22/2019  8:12 AM CDT -----  Contact: serjio smith   Would like cb to discuss procedure scheduled for 7/23. pls return call.     ..[phone]

## 2019-07-22 NOTE — TELEPHONE ENCOUNTER
Medications were sent in today to [facility].    HEIDY Nichols PA-C      ----- Message from Sue Napier MA sent at 7/15/2019  1:28 PM CDT -----  Hey,     Reminder:    Pt will need a refill on percocet's and Zofran by Wednesday.

## 2019-07-23 ENCOUNTER — HOSPITAL ENCOUNTER (OUTPATIENT)
Facility: HOSPITAL | Age: 74
Discharge: HOME | End: 2019-07-23
Attending: PAIN MEDICINE | Admitting: PAIN MEDICINE
Payer: MEDICARE

## 2019-07-23 VITALS
TEMPERATURE: 98 F | WEIGHT: 146.13 LBS | RESPIRATION RATE: 18 BRPM | HEIGHT: 64 IN | SYSTOLIC BLOOD PRESSURE: 126 MMHG | OXYGEN SATURATION: 99 % | BODY MASS INDEX: 24.95 KG/M2 | HEART RATE: 82 BPM | DIASTOLIC BLOOD PRESSURE: 62 MMHG

## 2019-07-23 DIAGNOSIS — M54.16 LUMBAR RADICULOPATHY: Primary | ICD-10-CM

## 2019-07-23 LAB — POCT GLUCOSE: 141 MG/DL (ref 70–110)

## 2019-07-23 PROCEDURE — 25500020 PHARM REV CODE 255: Performed by: PAIN MEDICINE

## 2019-07-23 PROCEDURE — 99152 PR MOD CONSCIOUS SEDATION, SAME PHYS, 5+ YRS, FIRST 15 MIN: ICD-10-PCS | Mod: ,,, | Performed by: PAIN MEDICINE

## 2019-07-23 PROCEDURE — 64483 NJX AA&/STRD TFRM EPI L/S 1: CPT | Mod: 50,,, | Performed by: PAIN MEDICINE

## 2019-07-23 PROCEDURE — 64483 PR EPIDURAL INJ, ANES/STEROID, TRANSFORAMINAL, LUMB/SACR, SNGL LEVL: ICD-10-PCS | Mod: 50,,, | Performed by: PAIN MEDICINE

## 2019-07-23 PROCEDURE — 63600175 PHARM REV CODE 636 W HCPCS: Performed by: PAIN MEDICINE

## 2019-07-23 PROCEDURE — 99152 MOD SED SAME PHYS/QHP 5/>YRS: CPT | Mod: ,,, | Performed by: PAIN MEDICINE

## 2019-07-23 PROCEDURE — 64483 NJX AA&/STRD TFRM EPI L/S 1: CPT | Mod: 50 | Performed by: PAIN MEDICINE

## 2019-07-23 PROCEDURE — 25000003 PHARM REV CODE 250: Performed by: PAIN MEDICINE

## 2019-07-23 PROCEDURE — A9585 GADOBUTROL INJECTION: HCPCS | Performed by: PAIN MEDICINE

## 2019-07-23 RX ORDER — GADOBUTROL 604.72 MG/ML
INJECTION INTRAVENOUS
Status: DISCONTINUED | OUTPATIENT
Start: 2019-07-23 | End: 2019-07-23 | Stop reason: HOSPADM

## 2019-07-23 RX ORDER — DEXAMETHASONE SODIUM PHOSPHATE 10 MG/ML
INJECTION INTRAMUSCULAR; INTRAVENOUS
Status: DISCONTINUED | OUTPATIENT
Start: 2019-07-23 | End: 2019-07-23 | Stop reason: HOSPADM

## 2019-07-23 RX ORDER — FENTANYL CITRATE 50 UG/ML
INJECTION, SOLUTION INTRAMUSCULAR; INTRAVENOUS
Status: DISCONTINUED | OUTPATIENT
Start: 2019-07-23 | End: 2019-07-23 | Stop reason: HOSPADM

## 2019-07-23 RX ORDER — LIDOCAINE HYDROCHLORIDE 10 MG/ML
INJECTION, SOLUTION EPIDURAL; INFILTRATION; INTRACAUDAL; PERINEURAL
Status: DISCONTINUED | OUTPATIENT
Start: 2019-07-23 | End: 2019-07-23 | Stop reason: HOSPADM

## 2019-07-23 RX ORDER — MIDAZOLAM HYDROCHLORIDE 1 MG/ML
INJECTION, SOLUTION INTRAMUSCULAR; INTRAVENOUS
Status: DISCONTINUED | OUTPATIENT
Start: 2019-07-23 | End: 2019-07-23 | Stop reason: HOSPADM

## 2019-07-23 NOTE — OP NOTE
"Procedure: Lumbar Transforaminal Epidural Steroid Injection under Fluoroscopic Guidance (supraneural approach)    Level: L3/4     Side: Bilateral    PROCEDURE DATE: 7/23/2019    Pre-operative Diagnosis: Lumbar Radiculopathy  Post-operative Diagnosis: Lumbar Radiculopathy    Provider: SCOUT Dias MD  Assistant(s): None    Anesthesia: Local, IV Sedation    >> 1 mg of VERSED    >> 25 mcg of FENTANYL     Indication: Low back pain with radiculopathy consistent with distribution of targeted nerve. Symptoms unresponsive to conservative treatments. Fluoroscopy was used to optimize visualization of needle placement and to maximize safety.     Procedure Description / Technique:  The patient was seen and identified in the preoperative area. Risks, benefits, complications, and alternatives were discussed with the patient. The patient agreed to proceed with the procedure and signed the consent. The site and side of the procedure was identified and marked. An IV was started. The patient was taken to the procedural suite.    The patient was positioned in prone orientation on procedure table and a pillow was placed under the abdomen to reduce lumbar lordosis. A time out was performed prior to any intervention. The procedure, site, side, and allergies were stated and agreed to by all present. The lumbosacral area was widely prepped with ChloraPrep. The procedural site was draped in usual sterile fashion. Vital signs were closely monitored throughout this procedure. Conscious sedation was used for this procedure to decrease patient anxiety.    The target area was visualized under fluoroscopy. The cephalocaudal angle of the fluoroscope was adjusted as to align the vertebral end plates. The fluoroscopic arm was rotated ipsilaterally to an angle of approximately 30 degrees until the "alberto dog" outline came into view and the tip of the inferior superior articular process pointed towards the midline, 6:00 position of the above " "pedicle. A 25 gauge 3.5 inch spinal needle was directed towards the "chin" of the "alberto dog" (adjacent to the pars interarticularis and inferior to the pedicle). The needle was advanced until OS was met at the inferior border of the pedicle / pars interface. The needle was adjusted so that it would pass inferior to the osseous border. The fluoroscope was then placed in the lateral position and the needle was slowly advanced until it rested in the posterior 1/3rd of the vertebral foramen. AP fluoroscopy was checked and the needle tip rested at the 6:00 position under the pedicle. No paresthesia was elicited during needle placement. With the needle tip in its final position, gentle aspiration was negative for blood and CSF. Gadavist (gadobutrol) 1 mmol/mL (1 to 2 mL) was injected under live fluoroscopy. Microbore tubing was used for injection. There was no pain or paresthesia on injection. The contrast clearly delineated the targeted nerve root on AP fluoroscopy. No vascular uptake was seen. A solution containing 2 mL of 1% PF Lidocaine and 2 mL of Dexamethasone (10 mg/mL) was mixed and 2 mL was injected slowly at each level targeted. There was minimal resistance on injection. No pain or paresthesia was elicited on injection. The stylet was replaced and the needle was withdrawn intact. This procedure was performed for each of the above indicated levels.     Description of Findings: Not applicable    Prosthetic devices, grafts, tissues, or devices implanted: None    Specimen Removed: No    Estimated Blood Loss: minimal    COMPLICATIONS: None    DISPOSITION / PLANS: The patient was transferred to the recovery area in a stable condition for observation. The patient was reexamined prior to discharge. There was no evidence of acute neurologic injury following the procedure.  Patient was discharged from the recovery room after meeting discharge criteria. Home discharge instructions were given to the patient by the " staff.

## 2019-07-23 NOTE — PLAN OF CARE
"MD notified pt reports have water in waiting area, "healing shingles" to face, and pt productive cough. No new orders. Okay to proceed with procedure.  "

## 2019-07-23 NOTE — DISCHARGE INSTRUCTIONS

## 2019-07-23 NOTE — INTERVAL H&P NOTE
The patient has been examined and the H&P has been reviewed:    I concur with the findings and changes have been noted since the H&P was written: Bilateral L3/4 transforaminal epidural steroid injection    Anesthesia/Surgery risks, benefits and alternative options discussed and understood by patient/family.          There are no hospital problems to display for this patient.

## 2019-07-23 NOTE — PLAN OF CARE
Patient d/c home in stable condition via wheelchair with ride. Verbalized understanding of d/c instructions. Patient voiced no complaints. Neuro intact. All questions and concerns addressed. Pt meets discharge criteria.

## 2019-07-23 NOTE — H&P
Progress Notes        Chief Pain Complaint:       Chief Complaint   Patient presents with    Pain       Generalized pain      History of Present Illness:   This patient is a 73 y.o. female who presents today complaining of the above noted pain/s. The patient describes the pain as follows.  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  She reports approximately 2 years ago she had to sudden deaths in the family which caused very stressful time for her and resulted in shingles rash in the left V1 distribution however she reports having excruciating pain in the left V1 and V2 distributions.  She denies having difficulty with eating food and brushing her teeth on the left side of her face however the left lateral side of her nose gets her excruciating pain.  She has been tried on numerous medications in the past including gabapentin, Lyrica, Valtrex, Celebrex, ibuprofen which have all provided minimal benefit however steroids have been most helpful.  Today she rates her pain as an 8/10 and describes a constant burning sensation the left side of her face in addition to radiation into her bilateral lower extremities, her right shoulder, her left upper extremity occasionally as a pins and needle sensation.  She does report having numbness and weakness in her left lower extremity.  She has been physical therapy which she completed in February of 2019 however this caused most of her low back and leg symptoms to worsen in addition to her post herpetic neuralgia to worsen.  She denies having bowel bladder difficulties.  Her symptoms are worse with activity such as walking in her somewhat improved with rest however she had finds it difficult to get into a comfortable position. She has been using topical lidocaine patches and applying to the left side of her face which does provide significant  benefit.  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     Previous Therapy:  Medications:  Celebrex, ibuprofen, gabapentin, Lyrica, Valtrex, steroids  Injections: None  Surgeries: None  Physical Therapy: Completed in the Past           Past Surgical History:   Procedure Laterality Date    BRAIN SURGERY        HYSTERECTOMY             Imaging / Labs / Studies (reviewed on 6/4/2019):     Review of Systems:  Review of Systems   Constitutional: Negative for fever.   HENT:        Left-sided rash of the face   Eyes: Negative for blurred vision.   Respiratory: Negative for cough and wheezing.    Cardiovascular: Negative for chest pain and orthopnea.   Gastrointestinal: Negative for constipation, diarrhea, nausea and vomiting.   Genitourinary: Negative for dysuria.   Musculoskeletal: Positive for back pain.   Skin: Negative for itching and rash.   Neurological: Positive for weakness.   Endo/Heme/Allergies: Does not bruise/bleed easily.         Physical Exam:  There were no vitals taken for this visit. (reviewed on 6/4/2019)\  General     Constitutional: She is oriented to person, place, and time. She appears well-developed and well-nourished.   HENT:   Head: Normocephalic and atraumatic.   Eyes: EOM are normal.   Neck: Neck supple.   Pulmonary/Chest: Effort normal.   Abdominal: She exhibits no distension.   Neurological: She is alert and oriented to person, place, and time. No cranial nerve deficit.   Psychiatric: She has a normal mood and affect.      General Musculoskeletal Exam   Gait: antalgic      Back (L-Spine & T-Spine) / Neck (C-Spine) Exam      Tenderness Right paramedian tenderness of the Lower L-Spine. Left paramedian tenderness of the Lower L-Spine.      Back (L-Spine & T-Spine) Range of Motion   Extension: normal   Flexion: normal   Lateral bend right: normal   Lateral bend  left: normal   Rotation right: normal   Rotation left: normal      Spinal Sensation   Right Side Sensation  L-Spine Level: normal  Left Side Sensation  L-Spine Level: normal     Comments:  -tender to palpation over bilateral low lumbar facet; increased pain with left and right lateral bending and flexion extension; negative PSIS tenderness bilaterally        Muscle Strength   Right Lower Extremity   Hip Flexion: 5/5   Quadriceps:  5/5   Hamstrin/5   Anterior tibial:  5/5/5  Left Lower Extremity   Hip Flexion: 5/5   Quadriceps:  5/5   Hamstrin/5   Anterior tibial:  5/5/5      Reflexes      Left Side  Quadriceps:  2+  Achilles:  2+  Ankle Clonus:  absent     Right Side   Quadriceps:  2+  Achilles:  2+  Ankle Clonus:  absent        Assessment  Lumbar Radiculopathy  Lumbar Spondylosis     1. 73 y.o. year old patient with PMH of        Past Medical History:   Diagnosis Date    Anticoagulant long-term use      Diabetes mellitus      Glaucoma      Hypertension      Seizures      Stroke         presenting with pain located left side of her face, left lower extremity, lumbar spine  2. Pain Generators / Etiology: Lumbar Radiculopathy and Lumbar Spondylosis, post herpetic neuralgia  3. Failed Meds (E- Effective, NE- Not Effective):  Celebrex-minimally effective, ibuprofen-minimally effective, gabapentin-minimally effective, Lyrica-minimally effective, Valtrex-minimally effective, steroid-effective  4. Physical Therapy - Completed in the Past  5. Psychological comorbidities - None  6. Anticoagulants / Antiplatelets: Plavix     PLAN:  1. Medications:  Continue steroid and recommended tumor at 500 mg twice daily and prescribed Cymbalta 30 mg once daily; have written down a couple different topical creams which she can apply to her face specifically Aspercreme with lidocaine and Biofreeze; if she does not get relief with Cymbalta there are several other medications that can be tried such as Topamax, amitriptyline,  nortriptyline to help with her post herpetic neuralgia  2. PT - patient has completed physical therapy numerous times in the past; recommend continue exercises at home as tolerated  3. Psychological - none  4. Labs - obtain creatinine this patient reports having elevated creatinine in the past  5. Imaging - obtain will obtain lumbar MRI to evaluate for left lower extremity weakness and pins and needle sensation; will obtain cervical and lumbar spine x-rays  6. Interventions - schedule none; consider lumbar epidural steroid injection in the future  7. Referrals - none  8. Records - none  9. Follow up visit - follow up in clinic in 8 weeks  10. Patient Questions - answered all of the patient's questions regarding diagnosis, therapy, and treatment  11.  This condition does not require this patient to take time off of work     SCOUT Dias MD  Interventional Pain  [facility] [place]

## 2019-07-23 NOTE — PLAN OF CARE
Pt aaa o x's 4, denies any pain/discomfort, what to expect during recovery discussed with pt at bedside. Pt verbalized understanding of post op instructions. All questions and concerns addressed, will continue to monitor until discharged.

## 2019-07-25 NOTE — DISCHARGE SUMMARY
[facility]  Short Stay  Discharge Summary    Admit Date: 7/23/2019    Discharge Date and Time: 7/23/2019 11:51 AM      Discharge Attending Physician: SCOUT Dias MD     Hospital Course (synopsis of major diagnoses, care, treatment, and services provided during the course of the hospital stay): Patient was admitted to Pre-op where informed consent was signed.  The patient was then taken to the procedure suite where the procedure was performed.  The patient was then return to the Pre-Op area and discharge was performed.     Final Diagnoses:    Principal Problem: <principal problem not specified>   Secondary Diagnoses: There are no hospital problems to display for this patient.      Discharged Condition: good    Disposition: Home or Self Care    Follow up/Patient Instructions:    Medications:  Reconciled Home Medications:      Medication List      CONTINUE taking these medications    * albuterol 2.5 mg /3 mL (0.083 %) nebulizer solution  Commonly known as:  PROVENTIL  Inhale 2.5 mg into the lungs 4 (four) times daily.     * albuterol 90 mcg/actuation inhaler  Commonly known as:  PROVENTIL/VENTOLIN HFA  Inhale 2 puffs into the lungs every 4 (four) hours as needed.     albuterol-ipratropium 2.5 mg-0.5 mg/3 mL nebulizer solution  Commonly known as:  DUO-NEB  Take 3 mLs by nebulization every 6 (six) hours as needed for Wheezing. Rescue     amLODIPine 10 MG tablet  Commonly known as:  NORVASC  Take 10 mg by mouth once daily.     benzonatate 100 MG capsule  Commonly known as:  TESSALON  TAKE 1 CAPSULE BY MOUTH THREE TIMES DAILY IF NEEDED FOR COUGH FOR 7 DAYS     budesonide-formoterol 160-4.5 mcg 160-4.5 mcg/actuation Hfaa  Commonly known as:  SYMBICORT  Inhale 2 puffs into the lungs 2 (two) times daily.     clarithromycin 250 MG tablet  Commonly known as:  BIAXIN  Take 1 tablet (250 mg total) by mouth 2 (two) times daily.     clopidogrel 75 mg tablet  Commonly known as:  PLAVIX  Take 75 mg by mouth once  daily.     dexlansoprazole 60 mg capsule  Commonly known as:  DEXILANT  Take 60 mg by mouth once daily.     diphenhydrAMINE 50 MG capsule  Commonly known as:  BENADRYL  Take 1 capsule (50 mg total) by mouth As instructed for Itching (take 1 tab one hour prior to test).     DULoxetine 30 MG capsule  Commonly known as:  CYMBALTA  Take 1 capsule (30 mg total) by mouth once daily.     fluticasone propionate 50 mcg/actuation Dsdv  Commonly known as:  FLOVENT DISKUS  Inhale 2 puffs into the lungs 2 (two) times daily. Controller     furosemide 20 MG tablet  Commonly known as:  LASIX  Take 10 mg by mouth 2 (two) times daily.     gemfibrozil 600 MG tablet  Commonly known as:  LOPID  Take 600 mg by mouth once daily.     hydrocodone-chlorpheniramine 10-8 mg/5 mL suspension  Commonly known as:  TUSSIONEX  Take 5 mLs by mouth every 12 (twelve) hours as needed for Cough or Congestion.     lidocaine-prilocaine cream  Commonly known as:  EMLA  Apply topically as needed. For itching or pain to face.     metFORMIN 500 MG tablet  Commonly known as:  GLUCOPHAGE  Take 500 mg by mouth once daily.     metoprolol tartrate 100 MG tablet  Commonly known as:  LOPRESSOR  Take 100 mg by mouth 2 (two) times daily.     naproxen 375 MG tablet  Commonly known as:  NAPROSYN  Take 1 tablet (375 mg total) by mouth 2 (two) times daily with meals.     nebulizer accessories Kit  by Miscellaneous route. Use as directed     ondansetron 4 MG tablet  Commonly known as:  ZOFRAN  Take 1 tablet (4 mg total) by mouth every 6 (six) hours.     ondansetron 4 MG Tbdl  Commonly known as:  ZOFRAN-ODT  Take 1 tablet (4 mg total) by mouth every 8 (eight) hours as needed (nausea/vomiting).     oxyCODONE-acetaminophen 5-325 mg per tablet  Commonly known as:  PERCOCET  Take 1 tablet by mouth every 4 to 6 hours as needed for Pain.     * predniSONE 50 MG Tab  Commonly known as:  DELTASONE  Take 1 tablet (50 mg total) by mouth As instructed (take 1 tab orally 7 hours prior to  test).     * predniSONE 50 MG Tab  Commonly known as:  DELTASONE  Take 1 tablet (50 mg total) by mouth As instructed (Take 1 tab and 50mg Benadryl orally 1 hour prior to test.).     promethazine 6.25 mg/5 mL syrup  Commonly known as:  PHENERGAN  Take 5 mLs (6.25 mg total) by mouth every 6 (six) hours as needed (cough).     ranitidine 75 MG tablet  Commonly known as:  ZANTAC  Take 150 mg by mouth 2 (two) times daily.     ranolazine 1,000 mg Tb12  Commonly known as:  RANEXA  Take 1,000 mg by mouth 2 (two) times daily.     roflumilast 500 mcg Tab  Commonly known as:  DALIRESP  Take 500 mcg by mouth once daily.     temazepam 15 mg Cap  Commonly known as:  RESTORIL  Take 15 mg by mouth every evening.     tiotropium 18 mcg inhalation capsule  Commonly known as:  SPIRIVA  Inhale 18 mcg into the lungs once daily.     tolterodine 2 MG Tab  Commonly known as:  DETROL  Take 4 mg by mouth once daily.     turmeric root extract 500 mg Cap  Take 500 mg by mouth 2 (two) times daily.     valsartan 320 MG tablet  Commonly known as:  DIOVAN  Take 320 mg by mouth once daily.     venlafaxine 150 MG Cp24  Commonly known as:  EFFEXOR-XR  Take 75 mg by mouth once daily.     VITAMIN D3 ORAL  Take 10,000 Units by mouth. Twice weekly         * This list has 4 medication(s) that are the same as other medications prescribed for you. Read the directions carefully, and ask your doctor or other care provider to review them with you.              Discharge Procedure Orders   Diet general     Call MD for:  severe uncontrolled pain     Call MD for:  difficulty breathing, headache or visual disturbances     Call MD for:  redness, tenderness, or signs of infection (pain, swelling, redness, odor or green/yellow discharge around incision site)     Activity as tolerated

## 2019-07-26 ENCOUNTER — HOSPITAL ENCOUNTER (EMERGENCY)
Facility: HOSPITAL | Age: 74
Discharge: HOME | End: 2019-07-27
Attending: FAMILY MEDICINE
Payer: MEDICARE

## 2019-07-26 ENCOUNTER — OFFICE VISIT (OUTPATIENT)
Dept: PULMONOLOGY | Facility: CLINIC | Age: 74
End: 2019-07-26
Payer: MEDICARE

## 2019-07-26 VITALS
OXYGEN SATURATION: 98 % | WEIGHT: 145.31 LBS | BODY MASS INDEX: 24.81 KG/M2 | RESPIRATION RATE: 18 BRPM | HEART RATE: 80 BPM | DIASTOLIC BLOOD PRESSURE: 64 MMHG | HEIGHT: 64 IN | SYSTOLIC BLOOD PRESSURE: 102 MMHG

## 2019-07-26 DIAGNOSIS — J84.112 UIP (USUAL INTERSTITIAL PNEUMONITIS): Chronic | ICD-10-CM

## 2019-07-26 DIAGNOSIS — J20.8 ACUTE BRONCHITIS DUE TO OTHER SPECIFIED ORGANISMS: Primary | ICD-10-CM

## 2019-07-26 DIAGNOSIS — R06.02 SHORTNESS OF BREATH: Primary | ICD-10-CM

## 2019-07-26 DIAGNOSIS — J30.9 CHRONIC ALLERGIC RHINITIS: ICD-10-CM

## 2019-07-26 PROCEDURE — 85025 COMPLETE CBC W/AUTO DIFF WBC: CPT

## 2019-07-26 PROCEDURE — 99285 EMERGENCY DEPT VISIT HI MDM: CPT | Mod: 25,27

## 2019-07-26 PROCEDURE — 99999 PR PBB SHADOW E&M-EST. PATIENT-LVL III: CPT | Mod: PBBFAC,,, | Performed by: INTERNAL MEDICINE

## 2019-07-26 PROCEDURE — 80053 COMPREHEN METABOLIC PANEL: CPT

## 2019-07-26 PROCEDURE — 87040 BLOOD CULTURE FOR BACTERIA: CPT

## 2019-07-26 PROCEDURE — 83880 ASSAY OF NATRIURETIC PEPTIDE: CPT

## 2019-07-26 PROCEDURE — 99999 PR PBB SHADOW E&M-EST. PATIENT-LVL III: ICD-10-PCS | Mod: PBBFAC,,, | Performed by: INTERNAL MEDICINE

## 2019-07-26 PROCEDURE — 99215 PR OFFICE/OUTPT VISIT, EST, LEVL V, 40-54 MIN: ICD-10-PCS | Mod: S$PBB,,, | Performed by: INTERNAL MEDICINE

## 2019-07-26 PROCEDURE — 83605 ASSAY OF LACTIC ACID: CPT

## 2019-07-26 PROCEDURE — 84484 ASSAY OF TROPONIN QUANT: CPT

## 2019-07-26 PROCEDURE — 99215 OFFICE O/P EST HI 40 MIN: CPT | Mod: S$PBB,,, | Performed by: INTERNAL MEDICINE

## 2019-07-26 PROCEDURE — 96372 THER/PROPH/DIAG INJ SC/IM: CPT | Mod: PBBFAC

## 2019-07-26 PROCEDURE — 99213 OFFICE O/P EST LOW 20 MIN: CPT | Mod: PBBFAC | Performed by: INTERNAL MEDICINE

## 2019-07-26 PROCEDURE — 36415 COLL VENOUS BLD VENIPUNCTURE: CPT

## 2019-07-26 PROCEDURE — 84145 PROCALCITONIN (PCT): CPT

## 2019-07-26 RX ORDER — TRIAMCINOLONE ACETONIDE 40 MG/ML
80 INJECTION, SUSPENSION INTRA-ARTICULAR; INTRAMUSCULAR ONCE
Status: COMPLETED | OUTPATIENT
Start: 2019-07-26 | End: 2019-07-26

## 2019-07-26 RX ORDER — ESCITALOPRAM OXALATE 20 MG/1
20 TABLET ORAL DAILY
Refills: 0 | Status: ON HOLD | COMMUNITY
Start: 2019-07-03 | End: 2019-08-07 | Stop reason: SDUPTHER

## 2019-07-26 RX ORDER — PREDNISONE 10 MG/1
TABLET ORAL
Qty: 60 TABLET | Refills: 0 | Status: ON HOLD | OUTPATIENT
Start: 2019-07-26 | End: 2019-08-07 | Stop reason: SDUPTHER

## 2019-07-26 RX ORDER — BENZONATATE 100 MG/1
100 CAPSULE ORAL EVERY 4 HOURS PRN
Qty: 120 CAPSULE | Refills: 3 | Status: ON HOLD | OUTPATIENT
Start: 2019-07-26 | End: 2019-08-07 | Stop reason: SDUPTHER

## 2019-07-26 RX ORDER — LEVOFLOXACIN 750 MG/1
750 TABLET ORAL DAILY
Qty: 14 TABLET | Refills: 0 | Status: ON HOLD | OUTPATIENT
Start: 2019-07-26 | End: 2019-08-07 | Stop reason: HOSPADM

## 2019-07-26 RX ADMIN — TRIAMCINOLONE ACETONIDE 80 MG: 400 INJECTION, SUSPENSION INTRA-ARTICULAR; INTRAMUSCULAR at 03:07

## 2019-07-26 NOTE — PROGRESS NOTES
Subjective:      Patient ID: Shireen Felix is a 73 y.o. female.    Patient Active Problem List   Diagnosis    Nasal septal perforation    Postherpetic neuralgia    CHF (congestive heart failure)    Conductive hearing loss, unilateral    Chronic otitis media    Chronic allergic rhinitis    Coronary artery disease    CVA (cerebral vascular accident)    Elevated IgE level    Focal seizures    GERD (gastroesophageal reflux disease)    History of cerebral aneurysm repair    History of tobacco abuse    HTN (hypertension)    Iron deficiency anemia    Spinal stenosis of lumbar region with neurogenic claudication    Acute bronchitis due to other specified organisms    UIP (usual interstitial pneumonitis)     Problem list has been reviewed.    Chief Complaint: COPD and Cough    HPI      Cough productive of yellowish phlegm for the past 5 days. Seen treated and discharged from the ED for same on 06/07.19. Follow up visit with Dr. Lamin Sarabia on 06/21/19.      Patients reports NYHA II dyspnea    The patient does not have currently have symptoms / an exacerbation.           Dyspnea Characteristics:   Exertional Dyspnea   Dyspnea Duration:  Chronic  Dyspnea Severity:  PAULINO 5  Dyspnea Timing:   Nocturnal and  Orthopnea  Dyspnea Contributing Factors:  Tobacco Abuse   Dyspnea Associated Symptoms:   Cough,  Sputum Production,  Hemoptysis and  Wheezing       Modified Paulino Dyspnea Scale      0  Nothing at all    0.5  Very, very slight (just noticeable)    1  Very slight    2  Slight    3  Moderate    4 Somewhat severe    5 Severe      6    7  Very severe    8    9   Very, very severe (almost maximal)  10  Maximal      She has a diagnosis of COPD. Previous pulmonologist was Dr. Shelley.     COPD QUESTIONNAIRE  How often do you cough?: Most of the time  How often do you have phlegm (mucus) in your chest?: All of the time  How often does your chest feel tight?: A little of the time  When you walk up a hill or  one flight of stairs, how often are you breathless?: All of the time  How often are you limited doing any activities at home?: Most of the time  How often are you confident leaving the house despite your lung condition?: Almost Never  How often do you sleep soundly?: A little of the time  How often do you have energy?: Never  Total score: 32     Her current respiratory therapy regimen is PROAIR,PROVENTIL, DUONEB, SYMBICORT, SPIRIVA, VENTOLIN, DALIRESP which provides  relief. She is  adherent with her regimen.      She has a family history of cystic fibrosis, but cystic fibrosis gene test in 2015 was negative for any tested mutations  Reports history of recurrent pneumonia.     A full  review of systems, past , family  and social histories was performed except as mentioned in the note above, these are non contributory to the main issues discussed today.     Previous Report Reviewed: lab reports, office notes and radiology reports     The following portions of the patient's history were reviewed and updated as appropriate: She  has a past medical history of Aneurysm, Anticoagulant long-term use, COPD (chronic obstructive pulmonary disease), Diabetes mellitus, Glaucoma, Hypertension, Seizures, Shingles (05/27/2017), and Stroke.  She  has a past surgical history that includes Brain surgery; Hysterectomy; and Transforaminal epidural injection of steroid (Bilateral, 7/23/2019).  Her family history is not on file.  She  reports that she quit smoking about 15 years ago. Her smoking use included cigarettes. She has a 10.00 pack-year smoking history. She has never used smokeless tobacco. She reports that she does not drink alcohol or use drugs.  She has a current medication list which includes the following prescription(s): albuterol, albuterol-ipratropium, amlodipine, cholecalciferol (vitamin d3), clopidogrel, dexlansoprazole, diphenhydramine, furosemide, gemfibrozil, hydrocodone-chlorpheniramine, lidocaine-prilocaine,  "metformin, metoprolol tartrate, naproxen, nebulizer accessories, ondansetron, ondansetron, oxycodone-acetaminophen, promethazine, ranitidine, ranolazine, temazepam, tiotropium, tolterodine, turmeric root extract, valsartan, venlafaxine, benzonatate, duloxetine, escitalopram oxalate, levofloxacin, and prednisone, and the following Facility-Administered Medications: triamcinolone acetonide.  She is allergic to doxycycline; penicillins; oxycodone; adhesive; iodine and iodide containing products; povidone-iodine; and sulfa (sulfonamide antibiotics)..    Review of Systems   Constitutional: Negative for fever and chills.   HENT: Positive for rhinorrhea, congestion and hearing loss. Negative for nosebleeds.    Eyes: Negative for redness.   Respiratory: Positive for cough, sputum production, wheezing, dyspnea on extertion and use of rescue inhaler. Negative for choking.    Genitourinary: Negative for hematuria.   Endocrine: Diabetes melllitus Negative for cold intolerance.    Musculoskeletal: Positive for arthralgias and back pain.   Skin: Negative for rash.   Gastrointestinal: Positive for acid reflux. Negative for vomiting.   Neurological: Negative for syncope and headaches.        History of stroke  Seizure disorder.  Post herpetic neuralgia   Hematological: Negative for adenopathy. Bleeds easily and excessive bruising.   Psychiatric/Behavioral: Negative for confusion.        Objective:   /64   Pulse 80   Resp 18   Ht 5' 4" (1.626 m)   Wt 65.9 kg (145 lb 4.5 oz)   SpO2 98%   BMI 24.94 kg/m²   Body mass index is 24.94 kg/m².    Physical Exam   Constitutional: She is oriented to person, place, and time. She appears well-developed and well-nourished. She appears distressed.   HENT:   Head: Normocephalic and atraumatic.   Right Ear: Tympanic membrane normal.   Left Ear: Tympanic membrane normal.   Nose: Mucosal edema and rhinorrhea present. Right sinus exhibits maxillary sinus tenderness. Right sinus exhibits no " frontal sinus tenderness. Left sinus exhibits maxillary sinus tenderness. Left sinus exhibits no frontal sinus tenderness.   Mouth/Throat: Posterior oropharyngeal erythema present. No oropharyngeal exudate, posterior oropharyngeal edema or tonsillar abscesses.   Eyes: Pupils are equal, round, and reactive to light. EOM are normal.   Neck: Normal range of motion. Neck supple.   Cardiovascular: Normal rate, regular rhythm, normal heart sounds and intact distal pulses.   No murmur heard.  Pulmonary/Chest: Effort normal. No stridor. No respiratory distress. She has wheezes. She has no rales.   Frequent cough   Abdominal: Soft. Bowel sounds are normal.   Musculoskeletal: Normal range of motion. She exhibits no edema or tenderness.   Neurological: She is alert and oriented to person, place, and time.   Skin: Skin is warm and dry. Rash (left forehead with healing zoster rash) noted. No pallor.   Psychiatric: She has a normal mood and affect. Her behavior is normal.       Personal Diagnostic Review    CT of chest performed on 06/04/19:  without contrast :  Mild respiratory motion artifact is seen.  There are juxtapleural reticulations seen most pronounced at the lung bases.  These are seen on both expiratory and inspiratory images.  There is suggestion of early honeycombing at the lung bases.  For example, see image 10 of series 5.  Appearance is suggestive of UIP pattern.  Mild lower lobe bronchiectasis is also seen.  No significant areas of air trapping are visualized.  No infiltrate, pneumothorax, or effusion is seen.  There is a nodule in the right lower lobe on image 237 of series 4 which measures 6.4 mm.    Heart size is normal.  No pericardial effusion.  Coronary artery and thoracic aortic calcifications are seen.  Trace fluid in the pericardial recesses.  Multiple small scattered mediastinal nodes are seen.  No bulky adenopathy.  Trachea remains patent.    Limited imaging through the upper abdomen demonstrates fatty  infiltration of the pancreas.  Adrenal glands are within normal limits.  Bilateral renal cysts are noted.  Calcified plaque in the abdominal aorta is present.  There are degenerative changes of the spine with Schmorl's nodes.  No destructive osseous lesion is evident.    CT SINUS / FACIAL BONES WO CONTRAST: 2/6/2019:   Previous bilateral temporal craniectomies with aneurysm clip in the region of the left middle cerebral artery. Status post ORIF of left lateral rim. Prior bilateral cataract surgery. No discrete orbital mass seen. Proximal glands appear normal.  CT of chest performed on 06/04/19:  without contrast :  Paranasal sinuses are clear. Opacification of a few inferior right mastoid air cells. Remainder the mastoid air cells are clear. Middle ears are clear.      Pulmonary function tests: 08/01/18  FEV1_PRE 2.26 1.42 - 2.52 L   FEV1/FVC_PRE 84.83 65.5 - 85.09 %   FVC_PRE 2.67 1.98 - 3.27 L   TLC_PRE 3.79 3.62 - 5.16 L   DLCO_PRE 18.67 12.85 - 25.85 ml/(min*mmHg)     Normal PFT      Assessment / plan :     Discussed diagnosis, its evaluation, treatment and usual course. All questions answered.    Problem List Items Addressed This Visit        Pulmonary    Acute bronchitis due to other specified organisms - Primary    Current Assessment & Plan       Kenalog 80 mg IM X 1    LEVAQUIN 750 MG po DAILY x 14 DAYS  PREDNISONE TAPER PER ORDERS.  BENZONATATE 100mg PO q 4 hours.       Re evaluate in 1 month.           Relevant Medications    triamcinolone acetonide injection 80 mg (Start on 7/26/2019  4:15 PM)    levoFLOXacin (LEVAQUIN) 750 MG tablet    benzonatate (TESSALON) 100 MG capsule    predniSONE (DELTASONE) 10 MG tablet    UIP (usual interstitial pneumonitis)    Current Assessment & Plan     Stable on CT chest.    Radiologic surveillance.        EVALUATION:  [x]        Complete PFT with bronchodilator.  []        Bronchial challenge with methacholine.   [x]        Stress test, pulmonary.  [x]        PULM -  Arterial Blood Gases  []        Chest X Ray  [x]        CT scan of chest.   [x]        FRANDY  [x]        Sedimentation rate  [x]        C-reactive protein  [x]        Anti-neutrophilic cytoplasmic antibody          PLAN:  Discussed diagnosis, its evaluation, treatment and usual course.  All questions answered. Discontinue Daliresp. Continue SPIRIVA, and ALBUTEROL.    STOP SYMBICORT, PROAIR,PROVENTIL AND DALIRESP.     Call if shortness of breath worsens, blood in sputum, change in character of cough, development of fever or chills, inability to maintain nutrition and hydration.     Re evaluate in four weeks with results.         Relevant Orders    FRANDY Screen w/Reflex    C-reactive protein    Sedimentation rate    Anti-neutrophilic cytoplasmic antibody    Complete PFT with bronchodilator    PULM - Arterial Blood Gases--in addition to PFT only    Pulmonary stress test       Other    Chronic allergic rhinitis    Current Assessment & Plan     Continue ALLEGRA  Continue SINGULAIR  Continue FLONASE.               TIME SPENT WITH PATIENT: Time spent: 45 minutes in face to face  discussion concerning diagnosis, prognosis, review of lab and test results, benefits of treatment as well as management of disease, counseling of patient and coordination of care between various health  care providers . Greater than half the time spent was used for coordination of care and counseling of patient.     Follow up in about 1 month (around 8/26/2019) for Bronchitis.

## 2019-07-26 NOTE — PATIENT INSTRUCTIONS
Levofloxacin tablets  What is this medicine?  LEVOFLOXACIN (minh bynum ELOY jude sin) is a quinolone antibiotic. It is used to treat certain kinds of bacterial infections. It will not work for colds, flu, or other viral infections.  How should I use this medicine?  Take this medicine by mouth with a full glass of water. Follow the directions on the prescription label. This medicine can be taken with or without food. Take your medicine at regular intervals. Do not take your medicine more often than directed. Do not skip doses or stop your medicine early even if you feel better. Do not stop taking except on your doctor's advice.  A special MedGuide will be given to you by the pharmacist with each prescription and refill. Be sure to read this information carefully each time.  Talk to your pediatrician regarding the use of this medicine in children. While this drug may be prescribed for children as young as 6 months for selected conditions, precautions do apply.  What side effects may I notice from receiving this medicine?  Side effects that you should report to your doctor or health care professional as soon as possible:  · allergic reactions like skin rash or hives, swelling of the face, lips, or tongue  · anxious  · confusion  · depressed mood  · diarrhea  · fast, irregular heartbeat  · hallucination, loss of contact with reality  · joint, muscle, or tendon pain or swelling  · pain, tingling, numbness in the hands or feet  · suicidal thoughts or other mood changes  · sunburn  · unusually weak or tired  Side effects that usually do not require medical attention (report to your doctor or health care professional if they continue or are bothersome):  · dry mouth  · headache  · nausea  · trouble sleeping  What may interact with this medicine?  Do not take this medicine with any of the following medications:  · arsenic trioxide  · chloroquine  · droperidol  · medicines for irregular heart rhythm like amiodarone, disopyramide,  dofetilide, flecainide, quinidine, procainamide, sotalol  · some medicines for depression or mental problems like phenothiazines, pimozide, and ziprasidone  This medicine may also interact with the following medications:  · amoxapine  · antacids  · birth control pills  · cisapride  · dairy products  · didanosine (ddI) buffered tablets or powder  · haloperidol  · multivitamins  · NSAIDS, medicines for pain and inflammation, like ibuprofen or naproxen  · retinoid products like tretinoin or isotretinoin  · risperidone  · some other antibiotics like clarithromycin or erythromycin  · sucralfate  · theophylline  · warfarin  What if I miss a dose?  If you miss a dose, take it as soon as you remember. If it is almost time for your next dose, take only that dose. Do not take double or extra doses.  Where should I keep my medicine?  Keep out of the reach of children.  Store at room temperature between 15 and 30 degrees C (59 and 86 degrees F). Keep in a tightly closed container. Throw away any unused medicine after the expiration date.  What should I tell my health care provider before I take this medicine?  They need to know if you have any of these conditions:  · bone problems  · cerebral disease  · history of low levels of potassium in the blood  · irregular heartbeat  · joint problems  · kidney disease  · myasthenia gravis  · seizures  · tendon problems  · tingling of the fingers or toes, or other nerve disorder  · an unusual or allergic reaction to levofloxacin, other quinolone antibiotics, foods, dyes, or preservatives  · pregnant or trying to get pregnant  · breast-feeding  What should I watch for while using this medicine?  Tell your doctor or health care professional if your symptoms do not improve or if they get worse. Drink several glasses of water a day and cut down on drinks that contain caffeine. You must not get dehydrated while taking this medicine.  You may get drowsy or dizzy. Do not drive, use machinery, or  do anything that needs mental alertness until you know how this medicine affects you. Do not sit or stand up quickly, especially if you are an older patient. This reduces the risk of dizzy or fainting spells.  This medicine can make you more sensitive to the sun. Keep out of the sun. If you cannot avoid being in the sun, wear protective clothing and use a sunscreen. Do not use sun lamps or tanning beds/booths. Contact your doctor if you get a sunburn.  If you are a diabetic monitor your blood glucose carefully. If you get an unusual reading stop taking this medicine and call your doctor right away.  Do not treat diarrhea with over-the-counter products. Contact your doctor if you have diarrhea that lasts more than 2 days or if the diarrhea is severe and watery.  Avoid antacids, calcium, iron, and zinc products for 2 hours before and 2 hours after taking a dose of this medicine.  NOTE:This sheet is a summary. It may not cover all possible information. If you have questions about this medicine, talk to your doctor, pharmacist, or health care provider. Copyright© 2017 [employer]

## 2019-07-26 NOTE — ASSESSMENT & PLAN NOTE
Stable on CT chest.    Radiologic surveillance.        EVALUATION:  [x]        Complete PFT with bronchodilator.  []        Bronchial challenge with methacholine.   [x]        Stress test, pulmonary.  [x]        PULM - Arterial Blood Gases  []        Chest X Ray  [x]        CT scan of chest.   [x]        FRANDY  [x]        Sedimentation rate  [x]        C-reactive protein  [x]        Anti-neutrophilic cytoplasmic antibody          PLAN:  Discussed diagnosis, its evaluation, treatment and usual course.  All questions answered. Discontinue Daliresp. Continue SPIRIVA, and ALBUTEROL.    STOP SYMBICORT, PROAIR,PROVENTIL AND DALIRESP.     Call if shortness of breath worsens, blood in sputum, change in character of cough, development of fever or chills, inability to maintain nutrition and hydration.     Re evaluate in four weeks with results.

## 2019-07-26 NOTE — ASSESSMENT & PLAN NOTE
Kenalog 80 mg IM X 1    LEVAQUIN 750 MG po DAILY x 14 DAYS  PREDNISONE TAPER PER ORDERS.  BENZONATATE 100mg PO q 4 hours.       Re evaluate in 1 month.

## 2019-07-27 VITALS
SYSTOLIC BLOOD PRESSURE: 109 MMHG | RESPIRATION RATE: 21 BRPM | BODY MASS INDEX: 24.81 KG/M2 | HEART RATE: 80 BPM | DIASTOLIC BLOOD PRESSURE: 54 MMHG | WEIGHT: 145.31 LBS | OXYGEN SATURATION: 99 % | HEIGHT: 64 IN | TEMPERATURE: 98 F

## 2019-07-27 LAB
ALBUMIN SERPL BCP-MCNC: 2.9 G/DL (ref 3.5–5.2)
ALP SERPL-CCNC: 48 U/L (ref 55–135)
ALT SERPL W/O P-5'-P-CCNC: 12 U/L (ref 10–44)
ANION GAP SERPL CALC-SCNC: 11 MMOL/L (ref 8–16)
AST SERPL-CCNC: 10 U/L (ref 10–40)
BASOPHILS # BLD AUTO: 0.01 K/UL (ref 0–0.2)
BASOPHILS NFR BLD: 0.1 % (ref 0–1.9)
BILIRUB SERPL-MCNC: 0.3 MG/DL (ref 0.1–1)
BNP SERPL-MCNC: 89 PG/ML (ref 0–99)
BUN SERPL-MCNC: 21 MG/DL (ref 8–23)
CALCIUM SERPL-MCNC: 9.4 MG/DL (ref 8.7–10.5)
CHLORIDE SERPL-SCNC: 110 MMOL/L (ref 95–110)
CO2 SERPL-SCNC: 18 MMOL/L (ref 23–29)
CREAT SERPL-MCNC: 1.3 MG/DL (ref 0.5–1.4)
DIFFERENTIAL METHOD: ABNORMAL
EOSINOPHIL # BLD AUTO: 0.1 K/UL (ref 0–0.5)
EOSINOPHIL NFR BLD: 1.2 % (ref 0–8)
ERYTHROCYTE [DISTWIDTH] IN BLOOD BY AUTOMATED COUNT: 15 % (ref 11.5–14.5)
EST. GFR  (AFRICAN AMERICAN): 47 ML/MIN/1.73 M^2
EST. GFR  (NON AFRICAN AMERICAN): 41 ML/MIN/1.73 M^2
GLUCOSE SERPL-MCNC: 101 MG/DL (ref 70–110)
HCT VFR BLD AUTO: 31.1 % (ref 37–48.5)
HGB BLD-MCNC: 10.2 G/DL (ref 12–16)
LACTATE SERPL-SCNC: 0.9 MMOL/L (ref 0.5–2.2)
LYMPHOCYTES # BLD AUTO: 1.8 K/UL (ref 1–4.8)
LYMPHOCYTES NFR BLD: 21.8 % (ref 18–48)
MCH RBC QN AUTO: 30.9 PG (ref 27–31)
MCHC RBC AUTO-ENTMCNC: 32.8 G/DL (ref 32–36)
MCV RBC AUTO: 94 FL (ref 82–98)
MONOCYTES # BLD AUTO: 0.7 K/UL (ref 0.3–1)
MONOCYTES NFR BLD: 8.9 % (ref 4–15)
NEUTROPHILS # BLD AUTO: 5.7 K/UL (ref 1.8–7.7)
NEUTROPHILS NFR BLD: 69.1 % (ref 38–73)
PLATELET # BLD AUTO: 246 K/UL (ref 150–350)
PMV BLD AUTO: 9.1 FL (ref 9.2–12.9)
POTASSIUM SERPL-SCNC: 4 MMOL/L (ref 3.5–5.1)
PROCALCITONIN SERPL IA-MCNC: 0.09 NG/ML
PROT SERPL-MCNC: 6.8 G/DL (ref 6–8.4)
RBC # BLD AUTO: 3.3 M/UL (ref 4–5.4)
SODIUM SERPL-SCNC: 139 MMOL/L (ref 136–145)
TROPONIN I SERPL DL<=0.01 NG/ML-MCNC: 0.01 NG/ML (ref 0–0.03)
WBC # BLD AUTO: 8.32 K/UL (ref 3.9–12.7)

## 2019-07-27 PROCEDURE — 94640 AIRWAY INHALATION TREATMENT: CPT

## 2019-07-27 PROCEDURE — 93005 ELECTROCARDIOGRAM TRACING: CPT

## 2019-07-27 PROCEDURE — 25000242 PHARM REV CODE 250 ALT 637 W/ HCPCS: Performed by: FAMILY MEDICINE

## 2019-07-27 PROCEDURE — 93010 ELECTROCARDIOGRAM REPORT: CPT | Mod: ,,, | Performed by: INTERNAL MEDICINE

## 2019-07-27 PROCEDURE — 93010 EKG 12-LEAD: ICD-10-PCS | Mod: ,,, | Performed by: INTERNAL MEDICINE

## 2019-07-27 RX ORDER — GUAIFENESIN 100 MG/5ML
100-200 SOLUTION ORAL EVERY 4 HOURS PRN
Qty: 60 ML | Refills: 0 | COMMUNITY
Start: 2019-07-27 | End: 2019-07-27 | Stop reason: SDUPTHER

## 2019-07-27 RX ORDER — GUAIFENESIN 100 MG/5ML
100-200 SOLUTION ORAL EVERY 4 HOURS PRN
Qty: 60 ML | Refills: 0 | Status: ON HOLD | OUTPATIENT
Start: 2019-07-27 | End: 2019-08-07

## 2019-07-27 RX ORDER — IPRATROPIUM BROMIDE AND ALBUTEROL SULFATE 2.5; .5 MG/3ML; MG/3ML
3 SOLUTION RESPIRATORY (INHALATION)
Status: COMPLETED | OUTPATIENT
Start: 2019-07-27 | End: 2019-07-27

## 2019-07-27 RX ADMIN — IPRATROPIUM BROMIDE AND ALBUTEROL SULFATE 3 ML: .5; 3 SOLUTION RESPIRATORY (INHALATION) at 12:07

## 2019-07-27 NOTE — ED PROVIDER NOTES
SCRIBE #1 NOTE: I, Cortez Samuel, am scribing for, and in the presence of, Melva Brito MD. I have scribed the entire note.      History      Chief Complaint   Patient presents with    Shortness of Breath     pt reports she has been sob and coughing that started today and has gotten worse over the last 30 mins.        Review of patient's allergies indicates:   Allergen Reactions    Doxycycline Nausea Only     Unable to tolerate medication  Unable to tolerate medication  Unable to tolerate medication  Unable to tolerate medication  Unable to tolerate medication      Penicillins Anaphylaxis, Hives, Shortness Of Breath and Swelling    Oxycodone Other (See Comments)     sleepy  sleepy  sleepy  sleepy  sleepy      Adhesive Rash    Iodine and iodide containing products Rash     Patient denies allergy to IV dye and has had multiple CT studies and a heart cath without allergy to IV contrast or premedication.  She states allergy was to Betadine.  Patient denies allergy to IV dye and has had multiple CT studies and a heart cath without allergy to IV contrast or premedication.  She states allergy was to Betadine.      Povidone-iodine Rash    Sulfa (sulfonamide antibiotics) Itching        HPI   HPI    7/27/2019, 12:05 AM   History obtained from the patient      History of Present Illness: Shireen Felix is a 73 y.o. female patient with a PMHx of COPD, DM, stroke, and HTN who presents to the Emergency Department for SOB, onset several hours PTA. Pt is not on O2 at home. Symptoms are constant and moderate in severity. No mitigating or exacerbating factors reported. Associated sxs include productive cough x 2-3 days. Patient denies any fever, chills, n/v, CP, BLE edema, weakness, numbness, dizziness, headache, and all other sxs at this time. Pt was given a steroid injection and was started on Prednisone yesterday by Dr. Singleton (Pulmonology). No further complaints or concerns at this time.     Arrival mode: Personal  vehicle    PCP: Jossie Ac MD       Past Medical History:  Past Medical History:   Diagnosis Date    Aneurysm     Anticoagulant long-term use     Arthritis     COPD (chronic obstructive pulmonary disease)     Diabetes mellitus     Glaucoma     Hypertension     Seizures     Shingles 05/27/2017    Stroke        Past Surgical History:  Past Surgical History:   Procedure Laterality Date    Bilateral L3/4 Transforaminal Epidural Steroid Injection Bilateral 7/23/2019    Performed by D[facility]as [facility] at [facility]    BRAIN SURGERY      HYSTERECTOMY           Family History:  History reviewed. No pertinent family history.    Social History:  Social History     Tobacco Use    Smoking status: Former Smoker     Packs/day: 1.00     Years: 10.00     Pack years: 10.00     Types: Cigarettes     Last attempt to quit: 4/11/2004     Years since quitting: 15.3    Smokeless tobacco: Never Used   Substance and Sexual Activity    Alcohol use: No    Drug use: Never    Sexual activity: Unknown       ROS   Review of Systems   Constitutional: Negative for chills, diaphoresis, fatigue and fever.   HENT: Negative for sore throat.    Respiratory: Positive for cough (productive) and shortness of breath.    Cardiovascular: Negative for chest pain and leg swelling.   Gastrointestinal: Negative for nausea and vomiting.   Genitourinary: Negative for dysuria.   Musculoskeletal: Negative for back pain.   Skin: Negative for rash and wound.   Neurological: Negative for dizziness, weakness, light-headedness, numbness and headaches.   Hematological: Does not bruise/bleed easily.   All other systems reviewed and are negative.    Physical Exam      Initial Vitals [07/26/19 2314]   BP Pulse Resp Temp SpO2   (!) 131/58 84 (!) 22 97.9 °F (36.6 °C) 97 %      MAP       --          Physical Exam  Nursing Notes and Vital Signs Reviewed.  Constitutional: Patient is in no acute distress. Well-developed and well-nourished.  Head:  "Atraumatic. Normocephalic.  Eyes: PERRL. EOM intact. Conjunctivae are not pale. No scleral icterus.  ENT: Mucous membranes are moist. Oropharynx is clear and symmetric.    Neck: Supple. Full ROM. No lymphadenopathy.  Cardiovascular: Regular rate. Regular rhythm. No murmurs, rubs, or gallops. Distal pulses are 2+ and symmetric.  Pulmonary/Chest: Tachypnic. Hoarse breath sounds. Crackles and rales bilaterally.  Abdominal: Soft and non-distended.  There is no tenderness.  No rebound, guarding, or rigidity. Good bowel sounds.  Musculoskeletal: Moves all extremities. No obvious deformities. No edema. No calf tenderness.  Skin: Warm and dry.  Neurological:  Alert, awake, and appropriate.  Normal speech.  No acute focal neurological deficits are appreciated.  Psychiatric: Normal affect. Good eye contact. Appropriate in content.    ED Course    Procedures  ED Vital Signs:  Vitals:    07/26/19 2314 07/27/19 0019 07/27/19 0027 07/27/19 0029   BP: (!) 131/58      Pulse: 84 72 72    Resp: (!) 22 18 20    Temp: 97.9 °F (36.6 °C)      TempSrc: Oral      SpO2: 97% 99% 100%    Weight:    65.9 kg (145 lb 4.5 oz)   Height: 5' 4" (1.626 m)       07/27/19 0033 07/27/19 0100 07/27/19 0200   BP:  (!) 105/50 (!) 109/54   Pulse: 72 77 80   Resp: 20 20 (!) 21   Temp:      TempSrc:      SpO2: 100% 99% 99%   Weight:      Height:          Abnormal Lab Results:  Labs Reviewed   CBC W/ AUTO DIFFERENTIAL - Abnormal; Notable for the following components:       Result Value    RBC 3.30 (*)     Hemoglobin 10.2 (*)     Hematocrit 31.1 (*)     RDW 15.0 (*)     MPV 9.1 (*)     All other components within normal limits   COMPREHENSIVE METABOLIC PANEL - Abnormal; Notable for the following components:    CO2 18 (*)     Albumin 2.9 (*)     Alkaline Phosphatase 48 (*)     eGFR if  47 (*)     eGFR if non  41 (*)     All other components within normal limits   CULTURE, BLOOD    Narrative:     Aerobic and anaerobic   LACTIC " ACID, PLASMA   B-TYPE NATRIURETIC PEPTIDE   TROPONIN I   PROCALCITONIN        All Lab Results:  Results for orders placed or performed during the hospital encounter of 07/26/19   Blood culture x two cultures. Draw prior to antibiotics.   Result Value Ref Range    Blood Culture, Routine No Growth to date    CBC auto differential   Result Value Ref Range    WBC 8.32 3.90 - 12.70 K/uL    RBC 3.30 (L) 4.00 - 5.40 M/uL    Hemoglobin 10.2 (L) 12.0 - 16.0 g/dL    Hematocrit 31.1 (L) 37.0 - 48.5 %    Mean Corpuscular Volume 94 82 - 98 fL    Mean Corpuscular Hemoglobin 30.9 27.0 - 31.0 pg    Mean Corpuscular Hemoglobin Conc 32.8 32.0 - 36.0 g/dL    RDW 15.0 (H) 11.5 - 14.5 %    Platelets 246 150 - 350 K/uL    MPV 9.1 (L) 9.2 - 12.9 fL    Gran # (ANC) 5.7 1.8 - 7.7 K/uL    Lymph # 1.8 1.0 - 4.8 K/uL    Mono # 0.7 0.3 - 1.0 K/uL    Eos # 0.1 0.0 - 0.5 K/uL    Baso # 0.01 0.00 - 0.20 K/uL    Gran% 69.1 38.0 - 73.0 %    Lymph% 21.8 18.0 - 48.0 %    Mono% 8.9 4.0 - 15.0 %    Eosinophil% 1.2 0.0 - 8.0 %    Basophil% 0.1 0.0 - 1.9 %    Differential Method Automated    Comprehensive metabolic panel   Result Value Ref Range    Sodium 139 136 - 145 mmol/L    Potassium 4.0 3.5 - 5.1 mmol/L    Chloride 110 95 - 110 mmol/L    CO2 18 (L) 23 - 29 mmol/L    Glucose 101 70 - 110 mg/dL    BUN, Bld 21 8 - 23 mg/dL    Creatinine 1.3 0.5 - 1.4 mg/dL    Calcium 9.4 8.7 - 10.5 mg/dL    Total Protein 6.8 6.0 - 8.4 g/dL    Albumin 2.9 (L) 3.5 - 5.2 g/dL    Total Bilirubin 0.3 0.1 - 1.0 mg/dL    Alkaline Phosphatase 48 (L) 55 - 135 U/L    AST 10 10 - 40 U/L    ALT 12 10 - 44 U/L    Anion Gap 11 8 - 16 mmol/L    eGFR if African American 47 (A) >60 mL/min/1.73 m^2    eGFR if non African American 41 (A) >60 mL/min/1.73 m^2   Lactic acid, plasma #1   Result Value Ref Range    Lactate (Lactic Acid) 0.9 0.5 - 2.2 mmol/L   Brain natriuretic peptide   Result Value Ref Range    BNP 89 0 - 99 pg/mL   Troponin I   Result Value Ref Range    Troponin I 0.011  0.000 - 0.026 ng/mL   Procalcitonin   Result Value Ref Range    Procalcitonin 0.09 <0.25 ng/mL     Imaging Results:  Imaging Results          X-Ray Chest AP Portable (Final result)  Result time 07/26/19 23:37:37    Final result by Brody Willis MD (07/26/19 23:37:37)                 Impression:      No acute process seen.      Electronically signed by: Brody Willis MD  Date:    07/26/2019  Time:    23:37             Narrative:    EXAMINATION:  XR CHEST AP PORTABLE    CLINICAL HISTORY:  Sepsis;    FINDINGS:  Single view of the chest.  Decreased lung volumes limits evaluation.  Aorta demonstrates atherosclerotic disease.    Cardiac silhouette is normal.  The lungs demonstrate no evidence of active disease.  No evidence of pleural effusion or pneumothorax.  Bones appear intact.                               The EKG was ordered, reviewed, and independently interpreted by the ED provider.  Interpretation time: 00:09  Rate: 73 BPM  Rhythm: normal sinus rhythm  Interpretation: Low voltage QRS. Possible lateral infarct, age undetermined. No STEMI.           The Emergency Provider reviewed the vital signs and test results, which are outlined above.    ED Discussion     1:03 AM: Reassessed pt at this time. Discussed with pt all pertinent ED information and results. Discussed pt dx and plan of tx. Gave pt all f/u and return to the ED instructions. All questions and concerns were addressed at this time. Pt expresses understanding of information and instructions, and is comfortable with plan to discharge. Pt is stable for discharge.    I discussed with patient and/or family/caretaker that evaluation in the ED does not suggest any emergent or life threatening medical conditions requiring immediate intervention beyond what was provided in the ED, and I believe patient is safe for discharge.  Regardless, an unremarkable evaluation in the ED does not preclude the development or presence of a serious of life threatening  condition. As such, patient was instructed to return immediately for any worsening or change in current symptoms.    ED Medication(s):  Medications   albuterol-ipratropium 2.5 mg-0.5 mg/3 mL nebulizer solution 3 mL (3 mLs Nebulization Given 7/27/19 0033)     Discharge Medication List as of 7/27/2019  1:02 AM      START taking these medications    Details   guaifenesin 100 mg/5 ml (ROBITUSSIN) 100 mg/5 mL syrup Take 5-10 mLs (100-200 mg total) by mouth every 4 (four) hours as needed for Cough., Starting Sat 7/27/2019, Until Tue 8/6/2019, OTC                Medical Decision Making    Medical Decision Making:   Clinical Tests:   Lab Tests: Ordered and Reviewed  Radiological Study: Ordered and Reviewed  Medical Tests: Ordered and Reviewed           Scribe Attestation:   Scribe #1: I performed the above scribed service and the documentation accurately describes the services I performed. I attest to the accuracy of the note.    Attending:   Physician Attestation Statement for Scribe #1: I, Melva Brito MD, personally performed the services described in this documentation, as scribed by Cortez Samuel, in my presence, and it is both accurate and complete.          Clinical Impression       ICD-10-CM ICD-9-CM   1. Shortness of breath R06.02 786.05       Disposition:   Disposition: Discharged  Condition: Stable         Melva Brito MD  07/27/19 7334

## 2019-07-28 ENCOUNTER — HOSPITAL ENCOUNTER (EMERGENCY)
Facility: HOSPITAL | Age: 74
Discharge: HOME | End: 2019-07-29
Attending: EMERGENCY MEDICINE
Payer: MEDICARE

## 2019-07-28 DIAGNOSIS — J44.1 COPD EXACERBATION: Primary | ICD-10-CM

## 2019-07-28 PROCEDURE — 94640 AIRWAY INHALATION TREATMENT: CPT

## 2019-07-28 PROCEDURE — 80048 BASIC METABOLIC PNL TOTAL CA: CPT

## 2019-07-28 PROCEDURE — 25000242 PHARM REV CODE 250 ALT 637 W/ HCPCS: Performed by: EMERGENCY MEDICINE

## 2019-07-28 PROCEDURE — 84484 ASSAY OF TROPONIN QUANT: CPT

## 2019-07-28 PROCEDURE — 96374 THER/PROPH/DIAG INJ IV PUSH: CPT

## 2019-07-28 PROCEDURE — 85025 COMPLETE CBC W/AUTO DIFF WBC: CPT

## 2019-07-28 PROCEDURE — 63600175 PHARM REV CODE 636 W HCPCS: Performed by: EMERGENCY MEDICINE

## 2019-07-28 PROCEDURE — 99285 EMERGENCY DEPT VISIT HI MDM: CPT | Mod: 25

## 2019-07-28 PROCEDURE — 36415 COLL VENOUS BLD VENIPUNCTURE: CPT

## 2019-07-28 RX ORDER — ALBUTEROL SULFATE 0.83 MG/ML
2.5 SOLUTION RESPIRATORY (INHALATION)
Status: COMPLETED | OUTPATIENT
Start: 2019-07-28 | End: 2019-07-28

## 2019-07-28 RX ORDER — METHYLPREDNISOLONE SOD SUCC 125 MG
40 VIAL (EA) INJECTION
Status: COMPLETED | OUTPATIENT
Start: 2019-07-28 | End: 2019-07-28

## 2019-07-28 RX ORDER — IPRATROPIUM BROMIDE AND ALBUTEROL SULFATE 2.5; .5 MG/3ML; MG/3ML
3 SOLUTION RESPIRATORY (INHALATION)
Status: COMPLETED | OUTPATIENT
Start: 2019-07-29 | End: 2019-07-28

## 2019-07-28 RX ADMIN — ALBUTEROL SULFATE 2.5 MG: 2.5 SOLUTION RESPIRATORY (INHALATION) at 11:07

## 2019-07-28 RX ADMIN — IPRATROPIUM BROMIDE AND ALBUTEROL SULFATE 3 ML: .5; 3 SOLUTION RESPIRATORY (INHALATION) at 11:07

## 2019-07-28 RX ADMIN — METHYLPREDNISOLONE SODIUM SUCCINATE 40 MG: 125 INJECTION, POWDER, FOR SOLUTION INTRAMUSCULAR; INTRAVENOUS at 11:07

## 2019-07-29 VITALS
RESPIRATION RATE: 25 BRPM | DIASTOLIC BLOOD PRESSURE: 85 MMHG | WEIGHT: 142.81 LBS | HEART RATE: 85 BPM | OXYGEN SATURATION: 97 % | TEMPERATURE: 98 F | BODY MASS INDEX: 24.38 KG/M2 | SYSTOLIC BLOOD PRESSURE: 114 MMHG | HEIGHT: 64 IN

## 2019-07-29 LAB
ALLENS TEST: ABNORMAL
ANION GAP SERPL CALC-SCNC: 12 MMOL/L (ref 8–16)
BASOPHILS # BLD AUTO: 0 K/UL (ref 0–0.2)
BASOPHILS NFR BLD: 0 % (ref 0–1.9)
BUN SERPL-MCNC: 29 MG/DL (ref 8–23)
CALCIUM SERPL-MCNC: 9.5 MG/DL (ref 8.7–10.5)
CHLORIDE SERPL-SCNC: 109 MMOL/L (ref 95–110)
CO2 SERPL-SCNC: 19 MMOL/L (ref 23–29)
CREAT SERPL-MCNC: 1.3 MG/DL (ref 0.5–1.4)
DELSYS: ABNORMAL
DIFFERENTIAL METHOD: ABNORMAL
EOSINOPHIL # BLD AUTO: 0 K/UL (ref 0–0.5)
EOSINOPHIL NFR BLD: 0 % (ref 0–8)
ERYTHROCYTE [DISTWIDTH] IN BLOOD BY AUTOMATED COUNT: 14.6 % (ref 11.5–14.5)
EST. GFR  (AFRICAN AMERICAN): 47 ML/MIN/1.73 M^2
EST. GFR  (NON AFRICAN AMERICAN): 41 ML/MIN/1.73 M^2
GLUCOSE SERPL-MCNC: 102 MG/DL (ref 70–110)
HCO3 UR-SCNC: 17.7 MMOL/L (ref 24–28)
HCT VFR BLD AUTO: 30.4 % (ref 37–48.5)
HGB BLD-MCNC: 9.9 G/DL (ref 12–16)
LYMPHOCYTES # BLD AUTO: 1 K/UL (ref 1–4.8)
LYMPHOCYTES NFR BLD: 12 % (ref 18–48)
MCH RBC QN AUTO: 30.3 PG (ref 27–31)
MCHC RBC AUTO-ENTMCNC: 32.6 G/DL (ref 32–36)
MCV RBC AUTO: 93 FL (ref 82–98)
MODE: ABNORMAL
MONOCYTES # BLD AUTO: 0.5 K/UL (ref 0.3–1)
MONOCYTES NFR BLD: 6.2 % (ref 4–15)
NEUTROPHILS # BLD AUTO: 6.6 K/UL (ref 1.8–7.7)
NEUTROPHILS NFR BLD: 84.3 % (ref 38–73)
PCO2 BLDA: 30.9 MMHG (ref 35–45)
PH SMN: 7.37 [PH] (ref 7.35–7.45)
PLATELET # BLD AUTO: 235 K/UL (ref 150–350)
PMV BLD AUTO: 8.9 FL (ref 9.2–12.9)
PO2 BLDA: 86 MMHG (ref 80–100)
POC BE: -8 MMOL/L
POC SATURATED O2: 96 % (ref 95–100)
POTASSIUM SERPL-SCNC: 4.3 MMOL/L (ref 3.5–5.1)
RBC # BLD AUTO: 3.27 M/UL (ref 4–5.4)
SAMPLE: ABNORMAL
SITE: ABNORMAL
SODIUM SERPL-SCNC: 140 MMOL/L (ref 136–145)
SP02: 97
TROPONIN I SERPL DL<=0.01 NG/ML-MCNC: 0.01 NG/ML (ref 0–0.03)
WBC # BLD AUTO: 8.08 K/UL (ref 3.9–12.7)

## 2019-07-29 PROCEDURE — 94640 AIRWAY INHALATION TREATMENT: CPT

## 2019-07-29 PROCEDURE — 99900035 HC TECH TIME PER 15 MIN (STAT)

## 2019-07-29 PROCEDURE — 25000242 PHARM REV CODE 250 ALT 637 W/ HCPCS: Performed by: EMERGENCY MEDICINE

## 2019-07-29 PROCEDURE — 36600 WITHDRAWAL OF ARTERIAL BLOOD: CPT

## 2019-07-29 PROCEDURE — 82803 BLOOD GASES ANY COMBINATION: CPT

## 2019-07-29 RX ORDER — HYDROCODONE POLISTIREX AND CHLORPHENIRAMINE POLISTIREX 10; 8 MG/5ML; MG/5ML
5 SUSPENSION, EXTENDED RELEASE ORAL EVERY 12 HOURS PRN
Qty: 100 ML | Refills: 0 | Status: SHIPPED | OUTPATIENT
Start: 2019-07-29 | End: 2019-08-02 | Stop reason: SDUPTHER

## 2019-07-29 RX ORDER — ALBUTEROL SULFATE 0.83 MG/ML
5 SOLUTION RESPIRATORY (INHALATION)
Status: COMPLETED | OUTPATIENT
Start: 2019-07-29 | End: 2019-07-29

## 2019-07-29 RX ADMIN — ALBUTEROL SULFATE 5 MG: 2.5 SOLUTION RESPIRATORY (INHALATION) at 01:07

## 2019-07-29 NOTE — ED PROVIDER NOTES
SCRIBE #1 NOTE: I, Genaro Castanon/Ava Bhatt, am scribing for, and in the presence of, Clarence Toth MD. I have scribed the entire note.      History      Chief Complaint   Patient presents with    Shortness of Breath     with productive cough       Review of patient's allergies indicates:   Allergen Reactions    Doxycycline Nausea Only     Unable to tolerate medication  Unable to tolerate medication  Unable to tolerate medication  Unable to tolerate medication  Unable to tolerate medication      Penicillins Anaphylaxis, Hives, Shortness Of Breath and Swelling    Oxycodone Other (See Comments)     sleepy  sleepy  sleepy  sleepy  sleepy      Adhesive Rash    Iodine and iodide containing products Rash     Patient denies allergy to IV dye and has had multiple CT studies and a heart cath without allergy to IV contrast or premedication.  She states allergy was to Betadine.  Patient denies allergy to IV dye and has had multiple CT studies and a heart cath without allergy to IV contrast or premedication.  She states allergy was to Betadine.      Povidone-iodine Rash    Sulfa (sulfonamide antibiotics) Itching        HPI   HPI    7/28/2019, 10:54 PM   History obtained from the patient      History of Present Illness: Shireen Felix is a 73 y.o. female patient with a PMHx of COPD, HTN, and DM who presents to the Emergency Department for SOB which onset gradually two days ago, worsening today. Pt reports seeing Dr. Singleton (Pulmonary Disease) on Friday and dx with acute bronchitis. Pt was prescribed 750 mg levofloxacin and prednisone, but reports no relief. Patient was evaluated in ED later that evening for SOB and was discharged with Robitussin. Symptoms are worsening and moderate in severity. No mitigating or exacerbating factors reported. Associated sxs include productive cough which onset a couple weeks ago. Patient also c/o episodes of epistaxis that onset yesterday. Patient denies any CP, chest pressure,  weakness, fever, chills, leg pain/swelling, rhinorrhea, sore throat, congestion, HA, dizziness, N/V and all other sxs at this time. Prior Tx includes prednisone today. Pt is a former smoker. Patient states she does have breathing tx at home, but is not on O2 at home. No further complaints or concerns at this time.     Arrival mode: EMS    PCP: Jossie Ac MD       Past Medical History:  Past Medical History:   Diagnosis Date    Aneurysm     Anticoagulant long-term use     Arthritis     COPD (chronic obstructive pulmonary disease)     Diabetes mellitus     Glaucoma     Hypertension     Seizures     Shingles 05/27/2017    Stroke        Past Surgical History:  Past Surgical History:   Procedure Laterality Date    Bilateral L3/4 Transforaminal Epidural Steroid Injection Bilateral 7/23/2019    Performed by [facility] at [facility]    BRAIN SURGERY      HYSTERECTOMY           Family History:  History reviewed. No pertinent family history.       Social History:  Social History     Tobacco Use    Smoking status: Former Smoker     Packs/day: 1.00     Years: 10.00     Pack years: 10.00     Types: Cigarettes     Last attempt to quit: 4/11/2004     Years since quitting: 15.3    Smokeless tobacco: Never Used   Substance and Sexual Activity    Alcohol use: No    Drug use: Never    Sexual activity: Unknown       ROS   Review of Systems   Constitutional: Negative for chills and fever.   HENT: Positive for nosebleeds. Negative for congestion, rhinorrhea and sore throat.    Respiratory: Positive for cough and shortness of breath.    Cardiovascular: Negative for chest pain and leg swelling.        (-) chest pressure   Gastrointestinal: Negative for nausea and vomiting.   Genitourinary: Negative for dysuria.   Musculoskeletal: Negative for back pain.        (-) leg pain   Skin: Negative for rash.   Neurological: Negative for dizziness, weakness and headaches.   Hematological: Does not bruise/bleed  "easily.   All other systems reviewed and are negative.    Physical Exam      Initial Vitals [07/28/19 2242]   BP Pulse Resp Temp SpO2   114/85 78 18 98 °F (36.7 °C) 100 %      MAP       --          Physical Exam  Nursing Notes and Vital Signs Reviewed.  Constitutional: Patient is in mild distress. Well-developed and well-nourished.  Head: Atraumatic. Normocephalic.  Eyes: PERRL. EOM intact. Conjunctivae are not pale. No scleral icterus.  ENT: Mucous membranes are moist. Oropharynx is clear and symmetric.    Neck: Supple. Full ROM. No lymphadenopathy.  Cardiovascular: Tachycardic. Regular rhythm. No murmurs, rubs, or gallops. Distal pulses are 2+ and symmetric.  Pulmonary/Chest: Moderate respiratory distress. Tachypnea. Diffuse inspiratory and expiratory wheezing. Diffuse coarse breath sounds.   Abdominal: Soft and non-distended.  There is no tenderness.  No rebound, guarding, or rigidity.   Musculoskeletal: Moves all extremities. No obvious deformities. No edema.   Skin: Warm and dry.  Neurological:  Alert, awake, and appropriate.  Normal speech.  No acute focal neurological deficits are appreciated.  Psychiatric: Normal affect. Good eye contact. Appropriate in content.    ED Course    Procedures  ED Vital Signs:  Vitals:    07/28/19 2242 07/28/19 2251 07/28/19 2315 07/28/19 2342   BP: 114/85      Pulse: 78  72 74   Resp: 18  (!) 22 (!) 22   Temp: 98 °F (36.7 °C)      TempSrc: Oral      SpO2: 100%  100% 99%   Weight:  64.8 kg (142 lb 12.8 oz)     Height: 5' 4" (1.626 m)       07/28/19 2348 07/28/19 2351 07/28/19 2355 07/29/19 0123   BP:       Pulse: 74 74 74 85   Resp: 20 18 20 (!) 25   Temp:       TempSrc:       SpO2: 100% 100% 100% 97%   Weight:       Height:           Abnormal Lab Results:  Labs Reviewed   CBC W/ AUTO DIFFERENTIAL - Abnormal; Notable for the following components:       Result Value    RBC 3.27 (*)     Hemoglobin 9.9 (*)     Hematocrit 30.4 (*)     RDW 14.6 (*)     MPV 8.9 (*)     Gran% 84.3 (*)  "    Lymph% 12.0 (*)     All other components within normal limits   BASIC METABOLIC PANEL - Abnormal; Notable for the following components:    CO2 19 (*)     BUN, Bld 29 (*)     eGFR if  47 (*)     eGFR if non  41 (*)     All other components within normal limits   ISTAT PROCEDURE - Abnormal; Notable for the following components:    POC PCO2 30.9 (*)     POC HCO3 17.7 (*)     All other components within normal limits   TROPONIN I        All Lab Results:  Results for orders placed or performed during the hospital encounter of 07/28/19   CBC auto differential   Result Value Ref Range    WBC 8.08 3.90 - 12.70 K/uL    RBC 3.27 (L) 4.00 - 5.40 M/uL    Hemoglobin 9.9 (L) 12.0 - 16.0 g/dL    Hematocrit 30.4 (L) 37.0 - 48.5 %    Mean Corpuscular Volume 93 82 - 98 fL    Mean Corpuscular Hemoglobin 30.3 27.0 - 31.0 pg    Mean Corpuscular Hemoglobin Conc 32.6 32.0 - 36.0 g/dL    RDW 14.6 (H) 11.5 - 14.5 %    Platelets 235 150 - 350 K/uL    MPV 8.9 (L) 9.2 - 12.9 fL    Gran # (ANC) 6.6 1.8 - 7.7 K/uL    Lymph # 1.0 1.0 - 4.8 K/uL    Mono # 0.5 0.3 - 1.0 K/uL    Eos # 0.0 0.0 - 0.5 K/uL    Baso # 0.00 0.00 - 0.20 K/uL    Gran% 84.3 (H) 38.0 - 73.0 %    Lymph% 12.0 (L) 18.0 - 48.0 %    Mono% 6.2 4.0 - 15.0 %    Eosinophil% 0.0 0.0 - 8.0 %    Basophil% 0.0 0.0 - 1.9 %    Differential Method Automated    Basic metabolic panel   Result Value Ref Range    Sodium 140 136 - 145 mmol/L    Potassium 4.3 3.5 - 5.1 mmol/L    Chloride 109 95 - 110 mmol/L    CO2 19 (L) 23 - 29 mmol/L    Glucose 102 70 - 110 mg/dL    BUN, Bld 29 (H) 8 - 23 mg/dL    Creatinine 1.3 0.5 - 1.4 mg/dL    Calcium 9.5 8.7 - 10.5 mg/dL    Anion Gap 12 8 - 16 mmol/L    eGFR if African American 47 (A) >60 mL/min/1.73 m^2    eGFR if non African American 41 (A) >60 mL/min/1.73 m^2   Troponin I   Result Value Ref Range    Troponin I 0.009 0.000 - 0.026 ng/mL   ISTAT PROCEDURE   Result Value Ref Range    POC PH 7.367 7.35 - 7.45    POC PCO2  30.9 (L) 35 - 45 mmHg    POC PO2 86 80 - 100 mmHg    POC HCO3 17.7 (L) 24 - 28 mmol/L    POC BE -8 -2 to 2 mmol/L    POC SATURATED O2 96 95 - 100 %    Sample ARTERIAL     Site RR     Allens Test Pass     DelSys Room Air     Mode SPONT     [id number]        Imaging Results:  Imaging Results          X-Ray Chest AP Portable (Final result)  Result time 07/28/19 23:27:30    Final result by Rizwan Breen MD (07/28/19 23:27:30)                 Impression:      No acute findings.      Electronically signed by: Rizwan Breen MD  Date:    07/28/2019  Time:    23:27             Narrative:    EXAMINATION:  XR CHEST AP PORTABLE    CLINICAL HISTORY:  COPD., Sob;    COMPARISON:  07/26/2019.    FINDINGS:  Heart size is normal.  Mediastinal silhouette is unremarkable.  The lung fields are clear with overall improved expansion.                                        The Emergency Provider reviewed the vital signs and test results, which are outlined above.    ED Discussion     2:08 AM: Reassessed pt at this time.  Pt states her condition has improving at this time. Discussed with pt all pertinent ED information and results. Discussed pt dx and plan of tx. Gave pt all f/u and return to the ED instructions. All questions and concerns were addressed at this time. Pt expresses understanding of information and instructions, and is comfortable with plan to discharge. Pt is stable for discharge.    I discussed with patient and/or family/caretaker that evaluation in the ED does not suggest any emergent or life threatening medical conditions requiring immediate intervention beyond what was provided in the ED, and I believe patient is safe for discharge.  Regardless, an unremarkable evaluation in the ED does not preclude the development or presence of a serious of life threatening condition. As such, patient was instructed to return immediately for any worsening or change in current symptoms.    ED Medication(s):  Medications    albuterol-ipratropium 2.5 mg-0.5 mg/3 mL nebulizer solution 3 mL ( Nebulization Canceled Entry 7/29/19 0115)   albuterol nebulizer solution 2.5 mg (2.5 mg Nebulization Given 7/28/19 2315)   methylPREDNISolone sodium succinate injection 40 mg (40 mg Intravenous Given 7/28/19 2327)   albuterol nebulizer solution 5 mg (5 mg Nebulization Given 7/29/19 0123)      Discharge Medication List as of 7/29/2019  2:12 AM            Follow-up Information     Jossie Ac MD In 3 days.    Specialty:  Family Medicine  Contact information:  [address]  [place] [address]  [phone]             [facility] - .    Specialty:  Emergency Medicine  Why:  As needed, If symptoms worsen  Contact information:  [address] [facility] [address] [phone]                   Medical Decision Making    Medical Decision Making:   Clinical Tests:   Lab Tests: Ordered and Reviewed  Radiological Study: Ordered and Reviewed           Scribe Attestation:   Scribe #1: I performed the above scribed service and the documentation accurately describes the services I performed. I attest to the accuracy of the note.    Attending:   Physician Attestation Statement for Scribe #1: I, Clarence Toth MD, personally performed the services described in this documentation, as scribed by Genaro Castanon/Ava Bhatt, in my presence, and it is both accurate and complete.          Clinical Impression       ICD-10-CM ICD-9-CM   1. COPD exacerbation J44.1 491.21       Disposition:   Disposition: Discharged  Condition: Stable         Clarence Toth MD  07/29/19 0854

## 2019-07-31 ENCOUNTER — HOSPITAL ENCOUNTER (EMERGENCY)
Facility: HOSPITAL | Age: 74
Discharge: HOME | End: 2019-07-31
Attending: EMERGENCY MEDICINE
Payer: MEDICARE

## 2019-07-31 ENCOUNTER — TELEPHONE (OUTPATIENT)
Dept: NEUROSURGERY | Facility: CLINIC | Age: 74
End: 2019-07-31

## 2019-07-31 VITALS
OXYGEN SATURATION: 99 % | BODY MASS INDEX: 25.44 KG/M2 | TEMPERATURE: 98 F | WEIGHT: 149 LBS | SYSTOLIC BLOOD PRESSURE: 129 MMHG | HEIGHT: 64 IN | RESPIRATION RATE: 18 BRPM | DIASTOLIC BLOOD PRESSURE: 77 MMHG | HEART RATE: 82 BPM

## 2019-07-31 DIAGNOSIS — M25.511 RIGHT SHOULDER PAIN: ICD-10-CM

## 2019-07-31 DIAGNOSIS — M25.552 BILATERAL HIP PAIN: ICD-10-CM

## 2019-07-31 DIAGNOSIS — G44.319 ACUTE POST-TRAUMATIC HEADACHE, NOT INTRACTABLE: ICD-10-CM

## 2019-07-31 DIAGNOSIS — M79.601 RIGHT ARM PAIN: ICD-10-CM
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
## 2019-07-31 DIAGNOSIS — M25.561 RIGHT KNEE PAIN: ICD-10-CM

## 2019-07-31 DIAGNOSIS — M54.50 CHRONIC BILATERAL LOW BACK PAIN WITHOUT SCIATICA: Primary | ICD-10-CM

## 2019-07-31 DIAGNOSIS — M25.551 BILATERAL HIP PAIN: ICD-10-CM

## 2019-07-31 DIAGNOSIS — M54.2 ACUTE NECK PAIN: ICD-10-CM

## 2019-07-31 DIAGNOSIS — Z99.3 WHEELCHAIR BOUND: ICD-10-CM

## 2019-07-31 DIAGNOSIS — G89.29 CHRONIC BILATERAL LOW BACK PAIN WITHOUT SCIATICA: Primary | ICD-10-CM

## 2019-07-31 LAB
ALBUMIN SERPL BCP-MCNC: 3.1 G/DL (ref 3.5–5.2)
ALP SERPL-CCNC: 45 U/L (ref 55–135)
ALT SERPL W/O P-5'-P-CCNC: 14 U/L (ref 10–44)
ANION GAP SERPL CALC-SCNC: 12 MMOL/L (ref 8–16)
AST SERPL-CCNC: 14 U/L (ref 10–40)
BASOPHILS # BLD AUTO: 0.01 K/UL (ref 0–0.2)
BASOPHILS NFR BLD: 0.1 % (ref 0–1.9)
BILIRUB SERPL-MCNC: 0.2 MG/DL (ref 0.1–1)
BILIRUB UR QL STRIP: NEGATIVE
BUN SERPL-MCNC: 44 MG/DL (ref 8–23)
CALCIUM SERPL-MCNC: 10.3 MG/DL (ref 8.7–10.5)
CHLORIDE SERPL-SCNC: 104 MMOL/L (ref 95–110)
CLARITY UR: CLEAR
CO2 SERPL-SCNC: 19 MMOL/L (ref 23–29)
COLOR UR: YELLOW
CREAT SERPL-MCNC: 1.5 MG/DL (ref 0.5–1.4)
DIFFERENTIAL METHOD: ABNORMAL
EOSINOPHIL # BLD AUTO: 0 K/UL (ref 0–0.5)
EOSINOPHIL NFR BLD: 0 % (ref 0–8)
ERYTHROCYTE [DISTWIDTH] IN BLOOD BY AUTOMATED COUNT: 14.3 % (ref 11.5–14.5)
EST. GFR  (AFRICAN AMERICAN): 39 ML/MIN/1.73 M^2
EST. GFR  (NON AFRICAN AMERICAN): 34 ML/MIN/1.73 M^2
GLUCOSE SERPL-MCNC: 92 MG/DL (ref 70–110)
GLUCOSE UR QL STRIP: NEGATIVE
HCT VFR BLD AUTO: 33.2 % (ref 37–48.5)
HGB BLD-MCNC: 11.2 G/DL (ref 12–16)
HGB UR QL STRIP: NEGATIVE
KETONES UR QL STRIP: NEGATIVE
LEUKOCYTE ESTERASE UR QL STRIP: NEGATIVE
LYMPHOCYTES # BLD AUTO: 0.8 K/UL (ref 1–4.8)
LYMPHOCYTES NFR BLD: 8.8 % (ref 18–48)
MCH RBC QN AUTO: 31.3 PG (ref 27–31)
MCHC RBC AUTO-ENTMCNC: 33.7 G/DL (ref 32–36)
MCV RBC AUTO: 93 FL (ref 82–98)
MONOCYTES # BLD AUTO: 1.1 K/UL (ref 0.3–1)
MONOCYTES NFR BLD: 12.7 % (ref 4–15)
NEUTROPHILS # BLD AUTO: 6.9 K/UL (ref 1.8–7.7)
NEUTROPHILS NFR BLD: 82.4 % (ref 38–73)
NITRITE UR QL STRIP: NEGATIVE
PH UR STRIP: 6 [PH] (ref 5–8)
PLATELET # BLD AUTO: 248 K/UL (ref 150–350)
PMV BLD AUTO: 9.1 FL (ref 9.2–12.9)
POTASSIUM SERPL-SCNC: 4.4 MMOL/L (ref 3.5–5.1)
PROT SERPL-MCNC: 7.1 G/DL (ref 6–8.4)
PROT UR QL STRIP: NEGATIVE
RBC # BLD AUTO: 3.58 M/UL (ref 4–5.4)
SODIUM SERPL-SCNC: 135 MMOL/L (ref 136–145)
SP GR UR STRIP: 1.01 (ref 1–1.03)
URN SPEC COLLECT METH UR: NORMAL
UROBILINOGEN UR STRIP-ACNC: NEGATIVE EU/DL
WBC # BLD AUTO: 8.82 K/UL (ref 3.9–12.7)

## 2019-07-31 PROCEDURE — 96360 HYDRATION IV INFUSION INIT: CPT

## 2019-07-31 PROCEDURE — 25000003 PHARM REV CODE 250: Performed by: EMERGENCY MEDICINE

## 2019-07-31 PROCEDURE — 80053 COMPREHEN METABOLIC PANEL: CPT

## 2019-07-31 PROCEDURE — 93010 EKG 12-LEAD: ICD-10-PCS | Mod: ,,, | Performed by: INTERNAL MEDICINE

## 2019-07-31 PROCEDURE — 36415 COLL VENOUS BLD VENIPUNCTURE: CPT

## 2019-07-31 PROCEDURE — 85025 COMPLETE CBC W/AUTO DIFF WBC: CPT

## 2019-07-31 PROCEDURE — 81003 URINALYSIS AUTO W/O SCOPE: CPT

## 2019-07-31 PROCEDURE — 63600175 PHARM REV CODE 636 W HCPCS: Performed by: EMERGENCY MEDICINE

## 2019-07-31 PROCEDURE — 99285 EMERGENCY DEPT VISIT HI MDM: CPT | Mod: 25

## 2019-07-31 PROCEDURE — 93010 ELECTROCARDIOGRAM REPORT: CPT | Mod: ,,, | Performed by: INTERNAL MEDICINE

## 2019-07-31 PROCEDURE — 93005 ELECTROCARDIOGRAM TRACING: CPT

## 2019-07-31 RX ORDER — OXYCODONE AND ACETAMINOPHEN 5; 325 MG/1; MG/1
1 TABLET ORAL ONCE
Status: COMPLETED | OUTPATIENT
Start: 2019-07-31 | End: 2019-07-31

## 2019-07-31 RX ADMIN — OXYCODONE HYDROCHLORIDE AND ACETAMINOPHEN 1 TABLET: 5; 325 TABLET ORAL at 02:07

## 2019-07-31 RX ADMIN — SODIUM CHLORIDE 1000 ML: 0.9 INJECTION, SOLUTION INTRAVENOUS at 01:07

## 2019-07-31 NOTE — TELEPHONE ENCOUNTER
Spoke with Rm pt's Brother, stated pt had a hard fall this morning he's brought her in to the ER and stated her R shoulder is hurting and she's having trouble moving it, informed pt to refer to the ER Dr for further instructions//ns

## 2019-08-01 ENCOUNTER — TELEPHONE (OUTPATIENT)
Dept: INTERNAL MEDICINE | Facility: CLINIC | Age: 74
End: 2019-08-01

## 2019-08-01 LAB — BACTERIA BLD CULT: NORMAL

## 2019-08-01 NOTE — TELEPHONE ENCOUNTER
Pt has a numerous amount of congestion . They went to the er and they gave her breathing treatments . She came home for the er on yesterday and she took a hard fall took her to the er and nothing was broken but her shoulder is still giving her problems. She cant lift her arm at all . The brother wants to know if she should she ortho for thus if she can you order a referral for her ? Also wants  A refill on the cough syrup they gave her at the hospital .        [phone]

## 2019-08-01 NOTE — TELEPHONE ENCOUNTER
----- Message from Jeanine Aviles sent at 8/1/2019 12:16 PM CDT -----  Contact: Patients brother, Rm Abdalla states his sister fell yesterday, hurt her shoulder, was seen in ER. Mr Abdalla needs to speak to nurse about this, please call him back at [phone]. Thank you

## 2019-08-02 ENCOUNTER — TELEPHONE (OUTPATIENT)
Dept: INTERNAL MEDICINE | Facility: CLINIC | Age: 74
End: 2019-08-02

## 2019-08-02 RX ORDER — HYDROCODONE POLISTIREX AND CHLORPHENIRAMINE POLISTIREX 10; 8 MG/5ML; MG/5ML
5 SUSPENSION, EXTENDED RELEASE ORAL EVERY 12 HOURS PRN
Qty: 100 ML | Refills: 0 | Status: ON HOLD | OUTPATIENT
Start: 2019-08-02 | End: 2019-08-07 | Stop reason: SDUPTHER

## 2019-08-02 NOTE — TELEPHONE ENCOUNTER
Spoke with  Rm and explained to him about the referral for home health. He also ask if she can get something for thrush and a terrible cough she had tussinex from er but she is now out of it . Advise !      [facility] !

## 2019-08-02 NOTE — TELEPHONE ENCOUNTER
----- Message from Ivon Preston sent at 8/2/2019  9:28 AM CDT -----  Contact: Mr. Rm Tran   Requesting a call back regarding pt (personal matter).please call back at [phone].      Thanks,.  Ivon Preston

## 2019-08-04 ENCOUNTER — HOSPITAL ENCOUNTER (OUTPATIENT)
Facility: HOSPITAL | Age: 74
Discharge: NURSING, REHAB OR LONG-TERM CARE | End: 2019-08-07
Attending: EMERGENCY MEDICINE | Admitting: INTERNAL MEDICINE
Payer: MEDICARE

## 2019-08-04 DIAGNOSIS — E86.0 DEHYDRATION: ICD-10-CM

## 2019-08-04 DIAGNOSIS — N17.9 ACUTE KIDNEY INJURY: ICD-10-CM

## 2019-08-04 DIAGNOSIS — I20.89 ANGINAL EQUIVALENT: ICD-10-CM

## 2019-08-04 DIAGNOSIS — N39.0 ACUTE LOWER UTI: ICD-10-CM

## 2019-08-04 DIAGNOSIS — R29.6 RECURRENT FALLS: Primary | ICD-10-CM

## 2019-08-04 DIAGNOSIS — J20.8 ACUTE BRONCHITIS DUE TO OTHER SPECIFIED ORGANISMS: ICD-10-CM

## 2019-08-04 DIAGNOSIS — R07.9 CHEST PAIN: ICD-10-CM
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
## 2019-08-04 PROBLEM — E11.10 DM (DIABETES MELLITUS) TYPE 2, UNCONTROLLED, WITH KETOACIDOSIS: Status: RESOLVED | Noted: 2019-08-04 | Resolved: 2019-08-04

## 2019-08-04 PROBLEM — N30.00 ACUTE CYSTITIS: Status: ACTIVE | Noted: 2019-08-04

## 2019-08-04 PROBLEM — E11.29 DM (DIABETES MELLITUS) TYPE II CONTROLLED WITH RENAL MANIFESTATION: Status: ACTIVE | Noted: 2019-08-04

## 2019-08-04 PROBLEM — N17.1 ACUTE RENAL FAILURE WITH ACUTE CORTICAL NECROSIS: Status: ACTIVE | Noted: 2019-08-04

## 2019-08-04 PROBLEM — J44.1 COPD EXACERBATION: Status: ACTIVE | Noted: 2019-08-04

## 2019-08-04 PROBLEM — E11.10 DM (DIABETES MELLITUS) TYPE 2, UNCONTROLLED, WITH KETOACIDOSIS: Status: ACTIVE | Noted: 2019-08-04

## 2019-08-04 LAB
ALBUMIN SERPL BCP-MCNC: 2.9 G/DL (ref 3.5–5.2)
ALP SERPL-CCNC: 57 U/L (ref 55–135)
ALT SERPL W/O P-5'-P-CCNC: 13 U/L (ref 10–44)
ANION GAP SERPL CALC-SCNC: 14 MMOL/L (ref 8–16)
AST SERPL-CCNC: 14 U/L (ref 10–40)
BACTERIA #/AREA URNS HPF: ABNORMAL /HPF
BASOPHILS # BLD AUTO: 0.01 K/UL (ref 0–0.2)
BASOPHILS NFR BLD: 0.1 % (ref 0–1.9)
BILIRUB SERPL-MCNC: 0.3 MG/DL (ref 0.1–1)
BILIRUB UR QL STRIP: ABNORMAL
BUN SERPL-MCNC: 46 MG/DL (ref 8–23)
CALCIUM SERPL-MCNC: 9.2 MG/DL (ref 8.7–10.5)
CHLORIDE SERPL-SCNC: 107 MMOL/L (ref 95–110)
CK SERPL-CCNC: 102 U/L (ref 20–180)
CLARITY UR: CLEAR
CO2 SERPL-SCNC: 17 MMOL/L (ref 23–29)
COLOR UR: YELLOW
CREAT SERPL-MCNC: 1.9 MG/DL (ref 0.5–1.4)
DIFFERENTIAL METHOD: ABNORMAL
EOSINOPHIL # BLD AUTO: 0 K/UL (ref 0–0.5)
EOSINOPHIL NFR BLD: 0.1 % (ref 0–8)
ERYTHROCYTE [DISTWIDTH] IN BLOOD BY AUTOMATED COUNT: 14.8 % (ref 11.5–14.5)
EST. GFR  (AFRICAN AMERICAN): 30 ML/MIN/1.73 M^2
EST. GFR  (NON AFRICAN AMERICAN): 26 ML/MIN/1.73 M^2
GLUCOSE SERPL-MCNC: 108 MG/DL (ref 70–110)
GLUCOSE UR QL STRIP: NEGATIVE
HCT VFR BLD AUTO: 35 % (ref 37–48.5)
HGB BLD-MCNC: 11.4 G/DL (ref 12–16)
HGB UR QL STRIP: NEGATIVE
HYALINE CASTS #/AREA URNS LPF: 10 /LPF
KETONES UR QL STRIP: ABNORMAL
LEUKOCYTE ESTERASE UR QL STRIP: NEGATIVE
LIPASE SERPL-CCNC: 8 U/L (ref 4–60)
LYMPHOCYTES # BLD AUTO: 1.2 K/UL (ref 1–4.8)
LYMPHOCYTES NFR BLD: 8.7 % (ref 18–48)
MCH RBC QN AUTO: 30.7 PG (ref 27–31)
MCHC RBC AUTO-ENTMCNC: 32.6 G/DL (ref 32–36)
MCV RBC AUTO: 94 FL (ref 82–98)
MICROSCOPIC COMMENT: ABNORMAL
MONOCYTES # BLD AUTO: 1.1 K/UL (ref 0.3–1)
MONOCYTES NFR BLD: 7.9 % (ref 4–15)
NEUTROPHILS # BLD AUTO: 11.6 K/UL (ref 1.8–7.7)
NEUTROPHILS NFR BLD: 85.2 % (ref 38–73)
NITRITE UR QL STRIP: POSITIVE
PH UR STRIP: 5 [PH] (ref 5–8)
PLATELET # BLD AUTO: 244 K/UL (ref 150–350)
PMV BLD AUTO: 8.9 FL (ref 9.2–12.9)
POTASSIUM SERPL-SCNC: 4.5 MMOL/L (ref 3.5–5.1)
PROT SERPL-MCNC: 6.7 G/DL (ref 6–8.4)
PROT UR QL STRIP: ABNORMAL
RBC # BLD AUTO: 3.71 M/UL (ref 4–5.4)
RBC #/AREA URNS HPF: 1 /HPF (ref 0–4)
SODIUM SERPL-SCNC: 138 MMOL/L (ref 136–145)
SP GR UR STRIP: >=1.03 (ref 1–1.03)
TROPONIN I SERPL DL<=0.01 NG/ML-MCNC: 0.01 NG/ML (ref 0–0.03)
URN SPEC COLLECT METH UR: ABNORMAL
UROBILINOGEN UR STRIP-ACNC: NEGATIVE EU/DL
WBC # BLD AUTO: 13.89 K/UL (ref 3.9–12.7)
WBC #/AREA URNS HPF: 1 /HPF (ref 0–5)

## 2019-08-04 PROCEDURE — 25000003 PHARM REV CODE 250: Performed by: INTERNAL MEDICINE

## 2019-08-04 PROCEDURE — 63600175 PHARM REV CODE 636 W HCPCS: Performed by: EMERGENCY MEDICINE

## 2019-08-04 PROCEDURE — 81000 URINALYSIS NONAUTO W/SCOPE: CPT

## 2019-08-04 PROCEDURE — 36415 COLL VENOUS BLD VENIPUNCTURE: CPT

## 2019-08-04 PROCEDURE — G0378 HOSPITAL OBSERVATION PER HR: HCPCS

## 2019-08-04 PROCEDURE — P9612 CATHETERIZE FOR URINE SPEC: HCPCS

## 2019-08-04 PROCEDURE — 94640 AIRWAY INHALATION TREATMENT: CPT

## 2019-08-04 PROCEDURE — 25000242 PHARM REV CODE 250 ALT 637 W/ HCPCS: Performed by: EMERGENCY MEDICINE

## 2019-08-04 PROCEDURE — 85025 COMPLETE CBC W/AUTO DIFF WBC: CPT

## 2019-08-04 PROCEDURE — 83690 ASSAY OF LIPASE: CPT

## 2019-08-04 PROCEDURE — 63600175 PHARM REV CODE 636 W HCPCS: Performed by: INTERNAL MEDICINE

## 2019-08-04 PROCEDURE — 80053 COMPREHEN METABOLIC PANEL: CPT

## 2019-08-04 PROCEDURE — 84484 ASSAY OF TROPONIN QUANT: CPT

## 2019-08-04 PROCEDURE — 96361 HYDRATE IV INFUSION ADD-ON: CPT

## 2019-08-04 PROCEDURE — 96372 THER/PROPH/DIAG INJ SC/IM: CPT | Mod: 59 | Performed by: EMERGENCY MEDICINE

## 2019-08-04 PROCEDURE — 99285 EMERGENCY DEPT VISIT HI MDM: CPT | Mod: 25

## 2019-08-04 PROCEDURE — 82550 ASSAY OF CK (CPK): CPT

## 2019-08-04 RX ORDER — TIOTROPIUM BROMIDE 18 UG/1
18 CAPSULE ORAL; RESPIRATORY (INHALATION) DAILY
Status: DISCONTINUED | OUTPATIENT
Start: 2019-08-05 | End: 2019-08-04

## 2019-08-04 RX ORDER — METOPROLOL TARTRATE 50 MG/1
100 TABLET ORAL 2 TIMES DAILY
Status: DISCONTINUED | OUTPATIENT
Start: 2019-08-04 | End: 2019-08-07 | Stop reason: HOSPADM

## 2019-08-04 RX ORDER — FAMOTIDINE 20 MG/1
20 TABLET, FILM COATED ORAL DAILY
Status: DISCONTINUED | OUTPATIENT
Start: 2019-08-05 | End: 2019-08-06

## 2019-08-04 RX ORDER — DIPHENHYDRAMINE HCL 50 MG
50 CAPSULE ORAL SEE ADMIN INSTRUCTIONS
Status: DISCONTINUED | OUTPATIENT
Start: 2019-08-04 | End: 2019-08-07 | Stop reason: HOSPADM

## 2019-08-04 RX ORDER — IBUPROFEN 200 MG
24 TABLET ORAL
Status: DISCONTINUED | OUTPATIENT
Start: 2019-08-04 | End: 2019-08-07 | Stop reason: HOSPADM

## 2019-08-04 RX ORDER — LEVOFLOXACIN 750 MG/1
750 TABLET ORAL
Status: COMPLETED | OUTPATIENT
Start: 2019-08-04 | End: 2019-08-04

## 2019-08-04 RX ORDER — RANOLAZINE 500 MG/1
1000 TABLET, EXTENDED RELEASE ORAL 2 TIMES DAILY
Status: DISCONTINUED | OUTPATIENT
Start: 2019-08-04 | End: 2019-08-07 | Stop reason: HOSPADM

## 2019-08-04 RX ORDER — IPRATROPIUM BROMIDE 0.5 MG/2.5ML
0.5 SOLUTION RESPIRATORY (INHALATION) EVERY 6 HOURS
Status: DISCONTINUED | OUTPATIENT
Start: 2019-08-04 | End: 2019-08-07 | Stop reason: HOSPADM

## 2019-08-04 RX ORDER — IBUPROFEN 200 MG
16 TABLET ORAL
Status: DISCONTINUED | OUTPATIENT
Start: 2019-08-04 | End: 2019-08-07 | Stop reason: HOSPADM

## 2019-08-04 RX ORDER — ONDANSETRON 4 MG/1
4 TABLET, FILM COATED ORAL EVERY 6 HOURS
Status: DISCONTINUED | OUTPATIENT
Start: 2019-08-05 | End: 2019-08-04

## 2019-08-04 RX ORDER — IPRATROPIUM BROMIDE AND ALBUTEROL SULFATE 2.5; .5 MG/3ML; MG/3ML
3 SOLUTION RESPIRATORY (INHALATION) EVERY 6 HOURS PRN
Status: DISCONTINUED | OUTPATIENT
Start: 2019-08-04 | End: 2019-08-07 | Stop reason: HOSPADM

## 2019-08-04 RX ORDER — GEMFIBROZIL 600 MG/1
600 TABLET, FILM COATED ORAL DAILY
Status: DISCONTINUED | OUTPATIENT
Start: 2019-08-05 | End: 2019-08-07 | Stop reason: HOSPADM

## 2019-08-04 RX ORDER — GLUCAGON 1 MG
1 KIT INJECTION
Status: DISCONTINUED | OUTPATIENT
Start: 2019-08-04 | End: 2019-08-07 | Stop reason: HOSPADM

## 2019-08-04 RX ORDER — INSULIN ASPART 100 [IU]/ML
1-10 INJECTION, SOLUTION INTRAVENOUS; SUBCUTANEOUS
Status: DISCONTINUED | OUTPATIENT
Start: 2019-08-04 | End: 2019-08-07 | Stop reason: HOSPADM

## 2019-08-04 RX ORDER — HEPARIN SODIUM 5000 [USP'U]/ML
5000 INJECTION, SOLUTION INTRAVENOUS; SUBCUTANEOUS EVERY 8 HOURS
Status: DISCONTINUED | OUTPATIENT
Start: 2019-08-04 | End: 2019-08-07 | Stop reason: HOSPADM

## 2019-08-04 RX ORDER — PROMETHAZINE HYDROCHLORIDE 6.25 MG/5ML
6.25 SYRUP ORAL EVERY 6 HOURS PRN
Status: DISCONTINUED | OUTPATIENT
Start: 2019-08-04 | End: 2019-08-06

## 2019-08-04 RX ORDER — OXYCODONE AND ACETAMINOPHEN 5; 325 MG/1; MG/1
1 TABLET ORAL EVERY 8 HOURS PRN
Status: DISCONTINUED | OUTPATIENT
Start: 2019-08-04 | End: 2019-08-07 | Stop reason: HOSPADM

## 2019-08-04 RX ORDER — LEVOFLOXACIN 500 MG/1
500 TABLET, FILM COATED ORAL DAILY
Status: DISCONTINUED | OUTPATIENT
Start: 2019-08-05 | End: 2019-08-05

## 2019-08-04 RX ORDER — VENLAFAXINE HYDROCHLORIDE 75 MG/1
75 CAPSULE, EXTENDED RELEASE ORAL DAILY
Status: DISCONTINUED | OUTPATIENT
Start: 2019-08-05 | End: 2019-08-07 | Stop reason: HOSPADM

## 2019-08-04 RX ORDER — ONDANSETRON 4 MG/1
4 TABLET, ORALLY DISINTEGRATING ORAL EVERY 6 HOURS
Status: DISCONTINUED | OUTPATIENT
Start: 2019-08-05 | End: 2019-08-07 | Stop reason: HOSPADM

## 2019-08-04 RX ORDER — SODIUM CHLORIDE 9 MG/ML
1000 INJECTION, SOLUTION INTRAVENOUS CONTINUOUS
Status: DISCONTINUED | OUTPATIENT
Start: 2019-08-04 | End: 2019-08-05

## 2019-08-04 RX ORDER — CLOPIDOGREL BISULFATE 75 MG/1
75 TABLET ORAL DAILY
Status: DISCONTINUED | OUTPATIENT
Start: 2019-08-05 | End: 2019-08-07 | Stop reason: HOSPADM

## 2019-08-04 RX ORDER — BENZONATATE 100 MG/1
100 CAPSULE ORAL EVERY 4 HOURS PRN
Status: DISCONTINUED | OUTPATIENT
Start: 2019-08-04 | End: 2019-08-07 | Stop reason: HOSPADM

## 2019-08-04 RX ORDER — RAMELTEON 8 MG/1
8 TABLET ORAL NIGHTLY PRN
Status: DISCONTINUED | OUTPATIENT
Start: 2019-08-04 | End: 2019-08-07 | Stop reason: HOSPADM

## 2019-08-04 RX ORDER — SODIUM CHLORIDE 9 MG/ML
1000 INJECTION, SOLUTION INTRAVENOUS
Status: COMPLETED | OUTPATIENT
Start: 2019-08-04 | End: 2019-08-04

## 2019-08-04 RX ORDER — OXYBUTYNIN CHLORIDE 5 MG/1
10 TABLET, EXTENDED RELEASE ORAL DAILY
Status: DISCONTINUED | OUTPATIENT
Start: 2019-08-05 | End: 2019-08-07 | Stop reason: HOSPADM

## 2019-08-04 RX ORDER — AMLODIPINE BESYLATE 10 MG/1
10 TABLET ORAL DAILY
Status: DISCONTINUED | OUTPATIENT
Start: 2019-08-05 | End: 2019-08-07 | Stop reason: HOSPADM

## 2019-08-04 RX ORDER — DULOXETIN HYDROCHLORIDE 30 MG/1
30 CAPSULE, DELAYED RELEASE ORAL DAILY
Status: DISCONTINUED | OUTPATIENT
Start: 2019-08-05 | End: 2019-08-07 | Stop reason: HOSPADM

## 2019-08-04 RX ORDER — ESCITALOPRAM OXALATE 10 MG/1
20 TABLET ORAL DAILY
Status: DISCONTINUED | OUTPATIENT
Start: 2019-08-05 | End: 2019-08-07 | Stop reason: HOSPADM

## 2019-08-04 RX ADMIN — SODIUM CHLORIDE 1000 ML: 0.9 INJECTION, SOLUTION INTRAVENOUS at 05:08

## 2019-08-04 RX ADMIN — SODIUM CHLORIDE 1000 ML: 0.9 INJECTION, SOLUTION INTRAVENOUS at 04:08

## 2019-08-04 RX ADMIN — HEPARIN SODIUM 5000 UNITS: 5000 INJECTION, SOLUTION INTRAVENOUS; SUBCUTANEOUS at 09:08

## 2019-08-04 RX ADMIN — SODIUM CHLORIDE 1000 ML: 0.9 INJECTION, SOLUTION INTRAVENOUS at 07:08

## 2019-08-04 RX ADMIN — LEVOFLOXACIN 750 MG: 750 TABLET, FILM COATED ORAL at 05:08

## 2019-08-04 RX ADMIN — ONDANSETRON 4 MG: 4 TABLET, ORALLY DISINTEGRATING ORAL at 11:08

## 2019-08-04 RX ADMIN — RANOLAZINE 1000 MG: 500 TABLET, FILM COATED, EXTENDED RELEASE ORAL at 09:08

## 2019-08-04 RX ADMIN — IPRATROPIUM BROMIDE 0.5 MG: 0.5 SOLUTION RESPIRATORY (INHALATION) at 07:08

## 2019-08-04 NOTE — ED NOTES
In and out catheter performed using sterile technique in order to obtain a urine specimen. Pt tolerated procedure well.

## 2019-08-04 NOTE — HPI
73 y.o. female patient with a PMHx of seizure amongsts other co morbidities  presents to the Emergency Department for evaluation after a fall which onset last night.   She has a history of recurrent falls ,she lost her balance and fell on her left side at home last night      As a result of this fall she is sufferingleft hip pain .  This is worse with movement and  weight-bearing.      She  denies syncope. Patient  was seen at this facility by Dr. Bautista on Wednesday for evaluation  Fall.      Patient is pending placement this upcoming week in rehab.     ER evaluation was significant for wbc 13.89 , CO2 17,creatinine of 1.9  urine positive for nitrates amd hyaline cast  , mild ketones in urine

## 2019-08-04 NOTE — ED NOTES
Attempted to ambulate pt with 2 EDTs.  Pt was unable to stand to ambulate any distance.  Pt was returned to ED stretcher without incident.  Physician made aware.

## 2019-08-04 NOTE — ED PROVIDER NOTES
"SCRIBE #1 NOTE: I, Tulio Ramos, am scribing for, and in the presence of, Juan Crouch MD. I have scribed the entire note.       History     Chief Complaint   Patient presents with    Fall     Pt states, "I fell last night and hurt my left hip."    Dysuria     Pt brother states, "Her urine is totally brown."     Review of patient's allergies indicates:   Allergen Reactions    Doxycycline Nausea Only     Unable to tolerate medication  Unable to tolerate medication  Unable to tolerate medication  Unable to tolerate medication  Unable to tolerate medication      Penicillins Anaphylaxis, Hives, Shortness Of Breath and Swelling    Oxycodone Other (See Comments)     sleepy  sleepy  sleepy  sleepy  sleepy      Adhesive Rash    Iodine and iodide containing products Rash     Patient denies allergy to IV dye and has had multiple CT studies and a heart cath without allergy to IV contrast or premedication.  She states allergy was to Betadine.  Patient denies allergy to IV dye and has had multiple CT studies and a heart cath without allergy to IV contrast or premedication.  She states allergy was to Betadine.      Povidone-iodine Rash    Sulfa (sulfonamide antibiotics) Itching         History of Present Illness     HPI    8/4/2019, 4:15 PM  History obtained from the  and patient      History of Present Illness: Shireen Felix is a 73 y.o. female patient with a PMHx of seizures who presents to the Emergency Department for evaluation after a fall which onset last night. Pt states she lost here balance and fell on her left side at home. C/o left side generalized myalgia.  The patient notes she has left hip pain as result.  This is worse with movement with weight-bearing.  Patient denies syncope.  Pt states she was seen at this facility by Dr. Bautista on Wednesday for a separate fall. Symptoms are constant and moderate in severity. No mitigating or exacerbating factors reported. Associated sxs includes dark " yellow urine. Patient denies any N/V, CP, SOB, dysuria, and all other sxs at this time. No prior Tx reported. No further complaints or concerns at this time.  Patient is pending placement this upcoming week in rehab.        Arrival mode: Personal vehicle    PCP: Jossie Ac MD        Past Medical History:  Past Medical History:   Diagnosis Date    Aneurysm     Anticoagulant long-term use     Arthritis     COPD (chronic obstructive pulmonary disease)     Diabetes mellitus     Glaucoma     Hypertension     Seizures     Shingles 05/27/2017    Stroke        Past Surgical History:  Past Surgical History:   Procedure Laterality Date    Bilateral L3/4 Transforaminal Epidural Steroid Injection Bilateral 7/23/2019    Performed by [facility] at [facility]    BRAIN SURGERY      HYSTERECTOMY           Family History:  History reviewed. No pertinent family history.    Social History:  Social History     Tobacco Use    Smoking status: Former Smoker     Packs/day: 1.00     Years: 10.00     Pack years: 10.00     Types: Cigarettes     Last attempt to quit: 4/11/2004     Years since quitting: 15.3    Smokeless tobacco: Never Used   Substance and Sexual Activity    Alcohol use: No    Drug use: Never    Sexual activity: Unknown        Review of Systems     Review of Systems   Constitutional: Negative for fever.        (+) fall   HENT: Negative for sore throat.    Respiratory: Negative for shortness of breath.    Cardiovascular: Negative for chest pain.   Gastrointestinal: Negative for nausea.   Genitourinary: Negative for dysuria.        (+) dark urine   Musculoskeletal: Positive for arthralgias (left hip) and myalgias (generalized left side). Negative for back pain.   Skin: Negative for rash.   Neurological: Negative for weakness.   Hematological: Does not bruise/bleed easily.   All other systems reviewed and are negative.       Physical Exam     Initial Vitals [08/04/19 1522]   BP Pulse Resp Temp  "SpO2   (!) 101/55 75 20 98 °F (36.7 °C) 99 %      MAP       --          Physical Exam  Nursing Notes and Vital Signs Reviewed.  GEN:  Alert and oriented to person place and time.  No acute distress.  HEENT:  Normocephalic atraumatic. Extraocular muscles intact bilaterally. No evidence of entrapment.  No nasal deformity.  Nasal septum is midline.  There is no septal hematoma.  Tympanic membranes are normal. No hemotympanum.  Negative Rust sign. No CSF leak  CV:.  Regular rate and rhythm without gallops murmurs or rubs. 2+ pulses bilateral upper and lower extremities.  PULM:  Clear to auscultation bilaterally. No respiratory distress    Gi:  Soft nontender nondistended with normoactive bowel sounds  :.  No CVA tenderness. No suprapubic tenderness.  MS:  There is no point C/T/L/S tenderness. Normal spinal curvature.  Pelvis is stable nontender.  There is mild tenderness over lateral left hip but full active range of motion.  The knee is nontender.  The foot is neurovascularly intact.  There is no shortening or rotation of the leg.  There is mild tenderness of the lateral right shoulder.  This is pending follow-up with Orthopedics already.  There is no chest wall tenderness.  Clavicles are nontender. All other bones have been palpated and joints ranged fully without tenderness or deformity.  NEURO:  II-XII intact bilaterally. No focal lateralizing signs.  SKIN:  Intact. No rash or laceration.       ED Course   Procedures  ED Vital Signs:  Vitals:    08/04/19 1522 08/04/19 1717   BP: (!) 101/55 (!) 112/58   Pulse: 75 76   Resp: 20    Temp: 98 °F (36.7 °C)    TempSrc: Oral    SpO2: 99% 100%   Height: 5' 4" (1.626 m)        Abnormal Lab Results:  Labs Reviewed   CBC W/ AUTO DIFFERENTIAL - Abnormal; Notable for the following components:       Result Value    WBC 13.89 (*)     RBC 3.71 (*)     Hemoglobin 11.4 (*)     Hematocrit 35.0 (*)     RDW 14.8 (*)     MPV 8.9 (*)     Gran # (ANC) 11.6 (*)     Mono # 1.1 (*)     " Gran% 85.2 (*)     Lymph% 8.7 (*)     All other components within normal limits   COMPREHENSIVE METABOLIC PANEL - Abnormal; Notable for the following components:    CO2 17 (*)     BUN, Bld 46 (*)     Creatinine 1.9 (*)     Albumin 2.9 (*)     eGFR if  30 (*)     eGFR if non  26 (*)     All other components within normal limits   URINALYSIS, REFLEX TO URINE CULTURE - Abnormal; Notable for the following components:    Specific Gravity, UA >=1.030 (*)     Protein, UA Trace (*)     Ketones, UA Trace (*)     Bilirubin (UA) 1+ (*)     Nitrite, UA Positive (*)     All other components within normal limits    Narrative:     Preferred Collection Type->Urine, Clean Catch   URINALYSIS MICROSCOPIC - Abnormal; Notable for the following components:    Hyaline Casts, UA 10 (*)     All other components within normal limits    Narrative:     Preferred Collection Type->Urine, Clean Catch   LIPASE   CK        All Lab Results:  Results for orders placed or performed during the hospital encounter of 08/04/19   CBC W/ AUTO DIFFERENTIAL   Result Value Ref Range    WBC 13.89 (H) 3.90 - 12.70 K/uL    RBC 3.71 (L) 4.00 - 5.40 M/uL    Hemoglobin 11.4 (L) 12.0 - 16.0 g/dL    Hematocrit 35.0 (L) 37.0 - 48.5 %    Mean Corpuscular Volume 94 82 - 98 fL    Mean Corpuscular Hemoglobin 30.7 27.0 - 31.0 pg    Mean Corpuscular Hemoglobin Conc 32.6 32.0 - 36.0 g/dL    RDW 14.8 (H) 11.5 - 14.5 %    Platelets 244 150 - 350 K/uL    MPV 8.9 (L) 9.2 - 12.9 fL    Gran # (ANC) 11.6 (H) 1.8 - 7.7 K/uL    Lymph # 1.2 1.0 - 4.8 K/uL    Mono # 1.1 (H) 0.3 - 1.0 K/uL    Eos # 0.0 0.0 - 0.5 K/uL    Baso # 0.01 0.00 - 0.20 K/uL    Gran% 85.2 (H) 38.0 - 73.0 %    Lymph% 8.7 (L) 18.0 - 48.0 %    Mono% 7.9 4.0 - 15.0 %    Eosinophil% 0.1 0.0 - 8.0 %    Basophil% 0.1 0.0 - 1.9 %    Differential Method Automated    Comp. Metabolic Panel   Result Value Ref Range    Sodium 138 136 - 145 mmol/L    Potassium 4.5 3.5 - 5.1 mmol/L    Chloride 107  95 - 110 mmol/L    CO2 17 (L) 23 - 29 mmol/L    Glucose 108 70 - 110 mg/dL    BUN, Bld 46 (H) 8 - 23 mg/dL    Creatinine 1.9 (H) 0.5 - 1.4 mg/dL    Calcium 9.2 8.7 - 10.5 mg/dL    Total Protein 6.7 6.0 - 8.4 g/dL    Albumin 2.9 (L) 3.5 - 5.2 g/dL    Total Bilirubin 0.3 0.1 - 1.0 mg/dL    Alkaline Phosphatase 57 55 - 135 U/L    AST 14 10 - 40 U/L    ALT 13 10 - 44 U/L    Anion Gap 14 8 - 16 mmol/L    eGFR if African American 30 (A) >60 mL/min/1.73 m^2    eGFR if non African American 26 (A) >60 mL/min/1.73 m^2   Lipase   Result Value Ref Range    Lipase 8 4 - 60 U/L   Urinalysis, Reflex to Urine Culture Urine, Clean Catch   Result Value Ref Range    Specimen UA Urine, Catheterized     Color, UA Yellow Yellow, Straw, Jeanie    Appearance, UA Clear Clear    pH, UA 5.0 5.0 - 8.0    Specific Gravity, UA >=1.030 (A) 1.005 - 1.030    Protein, UA Trace (A) Negative    Glucose, UA Negative Negative    Ketones, UA Trace (A) Negative    Bilirubin (UA) 1+ (A) Negative    Occult Blood UA Negative Negative    Nitrite, UA Positive (A) Negative    Urobilinogen, UA Negative <2.0 EU/dL    Leukocytes, UA Negative Negative   CK   Result Value Ref Range     20 - 180 U/L   Urinalysis Microscopic   Result Value Ref Range    RBC, UA 1 0 - 4 /hpf    WBC, UA 1 0 - 5 /hpf    Bacteria Rare None-Occ /hpf    Hyaline Casts, UA 10 (A) 0-1/lpf /lpf    Microscopic Comment SEE COMMENT          Imaging Results:  Imaging Results          X-Ray Hip 2 View Left (Final result)  Result time 08/04/19 16:28:29    Final result by Abraham Tomlin MD (08/04/19 16:28:29)                 Impression:      No acute findings..      Electronically signed by: Abraham Tomlin  Date:    08/04/2019  Time:    16:28             Narrative:    EXAMINATION:  XR HIP 2 VIEW LEFT    CLINICAL HISTORY:  Unspecified fall, initial encounter    TECHNIQUE:  Standard radiography performed.    COMPARISON:  07/31/2019    FINDINGS:  Bilateral hip replacements are intact in good  alignment.  Left hip appears free of any acute fracture or dislocation.                                        The Emergency Provider reviewed the vital signs and test results, which are outlined above.     ED Discussion     5:35 PM: Re-evaluated pt. Pt is resting comfortably and is in no acute distress.  D/w pt all pertinent results. D/w pt any concerns expressed at this time. Answered all questions. Pt expresses understanding at this time.    5:57 PM: Discussed case with Alysha Maldonado NP ([facility]). Dr. Solano agrees with current care and management of pt and accepts admission.   Admitting Service: Hospital Medicine  Admitting Physician: Dr. Solano  Admit to: Obs    6:00 PM: Re-evaluated pt. I have discussed test results, shared treatment plan, and the need for admission with patient and family at bedside. Pt and family express understanding at this time and agree with all information. All questions answered. Pt and family have no further questions or concerns at this time. Pt is ready for admit.    6:05 PM  Patient is stable nontoxic.  Patient had recently fall her right shoulder a week ago and has limited use of the arm.  She subsequently fell last titer left hip and can now not bear weight.  She is now week has UTI with acute kidney injury. Clinically she is dehydrated.  She is pending placement in a rehab facility at this time these orders are pending.  Patient is being admitted to Hospital Medicine.  We have been unable ambulate the patient in the department.          ED Medication(s):  Medications   sodium chloride 0.9% bolus 1,000 mL (0 mLs Intravenous Stopped 8/4/19 1744)   levoFLOXacin tablet 750 mg (750 mg Oral Given 8/4/19 1743)   0.9%  NaCl infusion (1,000 mLs Intravenous New Bag 8/4/19 1746)       New Prescriptions    No medications on file                 Medical Decision Making:   Clinical Tests:   Lab Tests: Ordered and Reviewed  Radiological Study: Ordered and Reviewed             Scribe  Attestation:   Scribe #1: I performed the above scribed service and the documentation accurately describes the services I performed. I attest to the accuracy of the note.     Attending:   Physician Attestation Statement for Scribe #1: I, Juan Crouch MD, personally performed the services described in this documentation, as scribed by Tulio Ramos, in my presence, and it is both accurate and complete.           Clinical Impression       ICD-10-CM ICD-9-CM   1. Recurrent falls R29.6 V15.88   2. Fall W19.XXXA E888.9   3. Dehydration E86.0 276.51   4. Acute lower UTI N39.0 599.0   5. Acute kidney injury N17.9 584.9       Disposition:   Disposition: Placed in Observation  Condition: Fair         Juan Crouch Jr., MD  08/04/19 6443

## 2019-08-04 NOTE — ED NOTES
Pt resting in ER stretcher, aaox4, rr e/u, NAD noted. Pt remains on BP and pulse ox monitor with vss noted. Bed low and locked, call light in reach, side rails up x2. Pt verbalized understanding of status and POC; denies further needs. Will continue to monitor. \

## 2019-08-04 NOTE — SUBJECTIVE & OBJECTIVE
Past Medical History:   Diagnosis Date    Aneurysm     Anticoagulant long-term use     Arthritis     COPD (chronic obstructive pulmonary disease)     Diabetes mellitus     Glaucoma     Hypertension     Seizures     Shingles 05/27/2017    Stroke        Past Surgical History:   Procedure Laterality Date    Bilateral L3/4 Transforaminal Epidural Steroid Injection Bilateral 7/23/2019    Performed by [facility] at [facility]    BRAIN SURGERY      HYSTERECTOMY         Review of patient's allergies indicates:   Allergen Reactions    Doxycycline Nausea Only     Unable to tolerate medication  Unable to tolerate medication  Unable to tolerate medication  Unable to tolerate medication  Unable to tolerate medication      Penicillins Anaphylaxis, Hives, Shortness Of Breath and Swelling    Oxycodone Other (See Comments)     sleepy  sleepy  sleepy  sleepy  sleepy      Adhesive Rash    Iodine and iodide containing products Rash     Patient denies allergy to IV dye and has had multiple CT studies and a heart cath without allergy to IV contrast or premedication.  She states allergy was to Betadine.  Patient denies allergy to IV dye and has had multiple CT studies and a heart cath without allergy to IV contrast or premedication.  She states allergy was to Betadine.      Povidone-iodine Rash    Sulfa (sulfonamide antibiotics) Itching       No current facility-administered medications on file prior to encounter.      Current Outpatient Medications on File Prior to Encounter   Medication Sig    (Magic mouthwash) 1:1:1 Benadryl 12.5mg/5ml liq, aluminum & magnesium hydroxide-simehticone (Maalox), lidocaine viscous 2% Swish and spit 15 mLs every 4 (four) hours as needed. for mouth sores    albuterol 90 mcg/actuation inhaler Inhale 2 puffs into the lungs every 4 (four) hours as needed.     albuterol-ipratropium (DUO-NEB) 2.5 mg-0.5 mg/3 mL nebulizer solution Take 3 mLs by nebulization every 6 (six) hours as  needed for Wheezing. Rescue    amLODIPine (NORVASC) 10 MG tablet Take 10 mg by mouth once daily.     clopidogrel (PLAVIX) 75 mg tablet Take 75 mg by mouth once daily.     dexlansoprazole (DEXILANT) 60 mg capsule Take 60 mg by mouth once daily.     diphenhydrAMINE (BENADRYL) 50 MG capsule Take 1 capsule (50 mg total) by mouth As instructed for Itching (take 1 tab one hour prior to test).    furosemide (LASIX) 20 MG tablet Take 10 mg by mouth 2 (two) times daily.     gemfibrozil (LOPID) 600 MG tablet Take 600 mg by mouth once daily.     hydrocodone-chlorpheniramine (TUSSIONEX) 10-8 mg/5 mL suspension Take 5 mLs by mouth every 12 (twelve) hours as needed for Cough or Congestion.    levoFLOXacin (LEVAQUIN) 750 MG tablet Take 1 tablet (750 mg total) by mouth once daily. for 14 days    metFORMIN (GLUCOPHAGE) 500 MG tablet Take 500 mg by mouth once daily.     metoprolol tartrate (LOPRESSOR) 100 MG tablet Take 100 mg by mouth 2 (two) times daily.     oxyCODONE-acetaminophen (PERCOCET) 5-325 mg per tablet Take 1 tablet by mouth every 4 to 6 hours as needed for Pain.    predniSONE (DELTASONE) 10 MG tablet 40 mg PO QD X 3 days then 30 mg PO QD x 3 days then 20 mg PO QD X 3 days then 10 mg PO QD X 3 days then 5 mg PO QD x 3 days then stop.    promethazine (PHENERGAN) 6.25 mg/5 mL syrup Take 5 mLs (6.25 mg total) by mouth every 6 (six) hours as needed (cough).    ranolazine (RANEXA) 1,000 mg Tb12 Take 1,000 mg by mouth 2 (two) times daily.     temazepam (RESTORIL) 15 mg Cap Take 15 mg by mouth every evening.     tiotropium (SPIRIVA) 18 mcg inhalation capsule Inhale 18 mcg into the lungs once daily.     tolterodine (DETROL) 2 MG Tab Take 4 mg by mouth once daily.     valsartan (DIOVAN) 320 MG tablet Take 320 mg by mouth once daily.     venlafaxine (EFFEXOR-XR) 150 MG Cp24 Take 75 mg by mouth once daily.    benzonatate (TESSALON) 100 MG capsule Take 1 capsule (100 mg total) by mouth every 4 (four) hours as  needed for Cough.    cholecalciferol, vitamin D3, (VITAMIN D3 ORAL) Take 10,000 Units by mouth. Twice weekly    DULoxetine (CYMBALTA) 30 MG capsule Take 1 capsule (30 mg total) by mouth once daily.    escitalopram oxalate (LEXAPRO) 20 MG tablet Take 20 mg by mouth once daily.    guaifenesin 100 mg/5 ml (ROBITUSSIN) 100 mg/5 mL syrup Take 5-10 mLs (100-200 mg total) by mouth every 4 (four) hours as needed for Cough.    lidocaine-prilocaine (EMLA) cream Apply topically as needed. For itching or pain to face.    naproxen (NAPROSYN) 375 MG tablet Take 1 tablet (375 mg total) by mouth 2 (two) times daily with meals.    nebulizer accessories Kit by Miscellaneous route. Use as directed    ondansetron (ZOFRAN) 4 MG tablet Take 1 tablet (4 mg total) by mouth every 6 (six) hours.    ondansetron (ZOFRAN-ODT) 4 MG TbDL Take 1 tablet (4 mg total) by mouth every 8 (eight) hours as needed (nausea/vomiting).    ranitidine (ZANTAC) 75 MG tablet Take 150 mg by mouth 2 (two) times daily.     turmeric root extract 500 mg Cap Take 500 mg by mouth 2 (two) times daily.     Family History     None        Tobacco Use    Smoking status: Former Smoker     Packs/day: 1.00     Years: 10.00     Pack years: 10.00     Types: Cigarettes     Last attempt to quit: 4/11/2004     Years since quitting: 15.3    Smokeless tobacco: Never Used   Substance and Sexual Activity    Alcohol use: No    Drug use: Never    Sexual activity: Not on file     Review of Systems   Constitutional: Positive for activity change.   HENT: Negative.    Eyes: Negative.    Respiratory: Negative.    Cardiovascular: Negative.    Gastrointestinal: Negative.    Endocrine: Negative.    Genitourinary: Negative.    Musculoskeletal: Positive for arthralgias, back pain, gait problem and myalgias.   Skin: Negative.    Allergic/Immunologic: Negative.    Hematological: Negative.    Psychiatric/Behavioral: Negative.      Objective:     Vital Signs (Most Recent):  Temp: 98 °F  (36.7 °C) (08/04/19 1522)  Pulse: 80 (08/04/19 1801)  Resp: 20 (08/04/19 1522)  BP: 103/65 (08/04/19 1801)  SpO2: 100 % (08/04/19 1801) Vital Signs (24h Range):  Temp:  [98 °F (36.7 °C)] 98 °F (36.7 °C)  Pulse:  [75-80] 80  Resp:  [20] 20  SpO2:  [99 %-100 %] 100 %  BP: (101-112)/(55-65) 103/65        Body mass index is 25.58 kg/m².    Physical Exam   Constitutional: She is oriented to person, place, and time. She appears well-developed and well-nourished.   HENT:   Head: Normocephalic and atraumatic.   Eyes: Pupils are equal, round, and reactive to light. Conjunctivae and EOM are normal.   Neck: Normal range of motion. Neck supple.   Cardiovascular: Normal rate and regular rhythm.   Pulmonary/Chest: Effort normal and breath sounds normal.   Abdominal: Soft. Bowel sounds are normal.   Musculoskeletal: Normal range of motion. She exhibits edema.   Neurological: She is alert and oriented to person, place, and time.   Skin: Skin is warm and dry.   Multiple ecchymoses   Psychiatric: She has a normal mood and affect. Her behavior is normal. Judgment and thought content normal.         CRANIAL NERVES     CN III, IV, VI   Pupils are equal, round, and reactive to light.  Extraocular motions are normal.        Significant Labs:   BMP:   Recent Labs   Lab 08/04/19  1615         K 4.5      CO2 17*   BUN 46*   CREATININE 1.9*   CALCIUM 9.2     CBC:   Recent Labs   Lab 08/04/19  1615   WBC 13.89*   HGB 11.4*   HCT 35.0*          Significant Imaging: I have reviewed all pertinent imaging results/findings within the past 24 hours.

## 2019-08-05 ENCOUNTER — TELEPHONE (OUTPATIENT)
Dept: INTERNAL MEDICINE | Facility: CLINIC | Age: 74
End: 2019-08-05

## 2019-08-05 PROBLEM — I25.10 CAD IN NATIVE ARTERY: Status: ACTIVE | Noted: 2019-08-05

## 2019-08-05 PROBLEM — R07.89 ATYPICAL CHEST PAIN: Status: ACTIVE | Noted: 2019-08-05

## 2019-08-05 PROBLEM — R79.89 ELEVATED TROPONIN: Status: ACTIVE | Noted: 2019-08-05

## 2019-08-05 PROBLEM — R94.31 ABNORMAL ECG: Status: ACTIVE | Noted: 2019-08-05

## 2019-08-05 LAB
ANION GAP SERPL CALC-SCNC: 9 MMOL/L (ref 8–16)
BUN SERPL-MCNC: 31 MG/DL (ref 8–23)
CALCIUM SERPL-MCNC: 8.7 MG/DL (ref 8.7–10.5)
CHLORIDE SERPL-SCNC: 111 MMOL/L (ref 95–110)
CO2 SERPL-SCNC: 17 MMOL/L (ref 23–29)
CREAT SERPL-MCNC: 1.3 MG/DL (ref 0.5–1.4)
DIASTOLIC DYSFUNCTION: NO
EST. GFR  (AFRICAN AMERICAN): 47 ML/MIN/1.73 M^2
EST. GFR  (NON AFRICAN AMERICAN): 41 ML/MIN/1.73 M^2
ESTIMATED PA SYSTOLIC PRESSURE: 29.63
GLUCOSE SERPL-MCNC: 59 MG/DL (ref 70–110)
MITRAL VALVE REGURGITATION: NORMAL
POCT GLUCOSE: 106 MG/DL (ref 70–110)
POCT GLUCOSE: 115 MG/DL (ref 70–110)
POCT GLUCOSE: 124 MG/DL (ref 70–110)
POCT GLUCOSE: 137 MG/DL (ref 70–110)
POCT GLUCOSE: 61 MG/DL (ref 70–110)
POCT GLUCOSE: 66 MG/DL (ref 70–110)
POCT GLUCOSE: 92 MG/DL (ref 70–110)
POTASSIUM SERPL-SCNC: 3.9 MMOL/L (ref 3.5–5.1)
RETIRED EF AND QEF - SEE NOTES: 60 (ref 55–65)
SODIUM SERPL-SCNC: 137 MMOL/L (ref 136–145)
TROPONIN I SERPL DL<=0.01 NG/ML-MCNC: 0.01 NG/ML (ref 0–0.03)
TROPONIN I SERPL DL<=0.01 NG/ML-MCNC: 0.01 NG/ML (ref 0–0.03)
TROPONIN I SERPL DL<=0.01 NG/ML-MCNC: 0.03 NG/ML (ref 0–0.03)
TROPONIN I SERPL DL<=0.01 NG/ML-MCNC: <0.006 NG/ML (ref 0–0.03)

## 2019-08-05 PROCEDURE — 25000242 PHARM REV CODE 250 ALT 637 W/ HCPCS: Performed by: INTERNAL MEDICINE

## 2019-08-05 PROCEDURE — 97163 PT EVAL HIGH COMPLEX 45 MIN: CPT

## 2019-08-05 PROCEDURE — 80048 BASIC METABOLIC PNL TOTAL CA: CPT

## 2019-08-05 PROCEDURE — 93005 ELECTROCARDIOGRAM TRACING: CPT

## 2019-08-05 PROCEDURE — 93306 TTE W/DOPPLER COMPLETE: CPT

## 2019-08-05 PROCEDURE — 25000242 PHARM REV CODE 250 ALT 637 W/ HCPCS: Performed by: EMERGENCY MEDICINE

## 2019-08-05 PROCEDURE — 96372 THER/PROPH/DIAG INJ SC/IM: CPT | Mod: 59 | Performed by: EMERGENCY MEDICINE

## 2019-08-05 PROCEDURE — 97530 THERAPEUTIC ACTIVITIES: CPT

## 2019-08-05 PROCEDURE — 97166 OT EVAL MOD COMPLEX 45 MIN: CPT

## 2019-08-05 PROCEDURE — G0378 HOSPITAL OBSERVATION PER HR: HCPCS

## 2019-08-05 PROCEDURE — 93010 ELECTROCARDIOGRAM REPORT: CPT | Mod: ,,, | Performed by: INTERNAL MEDICINE

## 2019-08-05 PROCEDURE — 93306 2D ECHO WITH COLOR FLOW DOPPLER: ICD-10-PCS | Mod: 26,,, | Performed by: INTERNAL MEDICINE

## 2019-08-05 PROCEDURE — 93010 EKG 12-LEAD: ICD-10-PCS | Mod: ,,, | Performed by: INTERNAL MEDICINE

## 2019-08-05 PROCEDURE — 96374 THER/PROPH/DIAG INJ IV PUSH: CPT | Mod: 59 | Performed by: EMERGENCY MEDICINE

## 2019-08-05 PROCEDURE — 94640 AIRWAY INHALATION TREATMENT: CPT

## 2019-08-05 PROCEDURE — 93306 TTE W/DOPPLER COMPLETE: CPT | Mod: 26,,, | Performed by: INTERNAL MEDICINE

## 2019-08-05 PROCEDURE — 25000003 PHARM REV CODE 250: Performed by: NURSE PRACTITIONER

## 2019-08-05 PROCEDURE — 84484 ASSAY OF TROPONIN QUANT: CPT | Mod: 91

## 2019-08-05 PROCEDURE — 99203 OFFICE O/P NEW LOW 30 MIN: CPT | Mod: ,,, | Performed by: INTERNAL MEDICINE

## 2019-08-05 PROCEDURE — 84484 ASSAY OF TROPONIN QUANT: CPT

## 2019-08-05 PROCEDURE — 96376 TX/PRO/DX INJ SAME DRUG ADON: CPT | Mod: 59 | Performed by: EMERGENCY MEDICINE

## 2019-08-05 PROCEDURE — 25000003 PHARM REV CODE 250: Performed by: INTERNAL MEDICINE

## 2019-08-05 PROCEDURE — 63600175 PHARM REV CODE 636 W HCPCS: Performed by: INTERNAL MEDICINE

## 2019-08-05 PROCEDURE — 99203 PR OFFICE/OUTPT VISIT, NEW, LEVL III, 30-44 MIN: ICD-10-PCS | Mod: ,,, | Performed by: INTERNAL MEDICINE

## 2019-08-05 PROCEDURE — 99900035 HC TECH TIME PER 15 MIN (STAT)

## 2019-08-05 PROCEDURE — 96375 TX/PRO/DX INJ NEW DRUG ADDON: CPT | Performed by: EMERGENCY MEDICINE

## 2019-08-05 PROCEDURE — 36415 COLL VENOUS BLD VENIPUNCTURE: CPT

## 2019-08-05 RX ORDER — LEVOFLOXACIN 750 MG/1
750 TABLET ORAL EVERY OTHER DAY
Status: DISCONTINUED | OUTPATIENT
Start: 2019-08-07 | End: 2019-08-07 | Stop reason: HOSPADM

## 2019-08-05 RX ORDER — BUDESONIDE 0.5 MG/2ML
0.5 INHALANT ORAL EVERY 12 HOURS
Status: DISCONTINUED | OUTPATIENT
Start: 2019-08-05 | End: 2019-08-07 | Stop reason: HOSPADM

## 2019-08-05 RX ORDER — CALCIUM CARBONATE 200(500)MG
500 TABLET,CHEWABLE ORAL 3 TIMES DAILY PRN
Status: DISCONTINUED | OUTPATIENT
Start: 2019-08-05 | End: 2019-08-07 | Stop reason: HOSPADM

## 2019-08-05 RX ORDER — ARFORMOTEROL TARTRATE 15 UG/2ML
15 SOLUTION RESPIRATORY (INHALATION) EVERY 12 HOURS
Status: DISCONTINUED | OUTPATIENT
Start: 2019-08-05 | End: 2019-08-07 | Stop reason: HOSPADM

## 2019-08-05 RX ADMIN — HEPARIN SODIUM 5000 UNITS: 5000 INJECTION, SOLUTION INTRAVENOUS; SUBCUTANEOUS at 09:08

## 2019-08-05 RX ADMIN — HEPARIN SODIUM 5000 UNITS: 5000 INJECTION, SOLUTION INTRAVENOUS; SUBCUTANEOUS at 01:08

## 2019-08-05 RX ADMIN — METHYLPREDNISOLONE SODIUM SUCCINATE 40 MG: 40 INJECTION, POWDER, FOR SOLUTION INTRAMUSCULAR; INTRAVENOUS at 12:08

## 2019-08-05 RX ADMIN — HEPARIN SODIUM 5000 UNITS: 5000 INJECTION, SOLUTION INTRAVENOUS; SUBCUTANEOUS at 05:08

## 2019-08-05 RX ADMIN — CLOPIDOGREL BISULFATE 75 MG: 75 TABLET ORAL at 09:08

## 2019-08-05 RX ADMIN — ONDANSETRON 4 MG: 4 TABLET, ORALLY DISINTEGRATING ORAL at 11:08

## 2019-08-05 RX ADMIN — METHYLPREDNISOLONE SODIUM SUCCINATE 40 MG: 40 INJECTION, POWDER, FOR SOLUTION INTRAMUSCULAR; INTRAVENOUS at 07:08

## 2019-08-05 RX ADMIN — IPRATROPIUM BROMIDE 0.5 MG: 0.5 SOLUTION RESPIRATORY (INHALATION) at 07:08

## 2019-08-05 RX ADMIN — Medication 15 ML: at 05:08

## 2019-08-05 RX ADMIN — METOPROLOL TARTRATE 100 MG: 50 TABLET ORAL at 09:08

## 2019-08-05 RX ADMIN — Medication 15 ML: at 09:08

## 2019-08-05 RX ADMIN — AMLODIPINE BESYLATE 10 MG: 10 TABLET ORAL at 09:08

## 2019-08-05 RX ADMIN — ESCITALOPRAM OXALATE 20 MG: 10 TABLET, FILM COATED ORAL at 09:08

## 2019-08-05 RX ADMIN — OXYBUTYNIN CHLORIDE 10 MG: 5 TABLET, EXTENDED RELEASE ORAL at 09:08

## 2019-08-05 RX ADMIN — IPRATROPIUM BROMIDE 0.5 MG: 0.5 SOLUTION RESPIRATORY (INHALATION) at 11:08

## 2019-08-05 RX ADMIN — OXYCODONE AND ACETAMINOPHEN 1 TABLET: 5; 325 TABLET ORAL at 12:08

## 2019-08-05 RX ADMIN — RANOLAZINE 1000 MG: 500 TABLET, FILM COATED, EXTENDED RELEASE ORAL at 09:08

## 2019-08-05 RX ADMIN — IPRATROPIUM BROMIDE 0.5 MG: 0.5 SOLUTION RESPIRATORY (INHALATION) at 12:08

## 2019-08-05 RX ADMIN — CALCIUM CARBONATE (ANTACID) CHEW TAB 500 MG 500 MG: 500 CHEW TAB at 09:08

## 2019-08-05 RX ADMIN — RAMELTEON 8 MG: 8 TABLET, FILM COATED ORAL at 09:08

## 2019-08-05 RX ADMIN — GEMFIBROZIL 600 MG: 600 TABLET ORAL at 09:08

## 2019-08-05 RX ADMIN — Medication 16 G: at 11:08

## 2019-08-05 RX ADMIN — FAMOTIDINE 20 MG: 20 TABLET ORAL at 09:08

## 2019-08-05 RX ADMIN — ONDANSETRON 4 MG: 4 TABLET, ORALLY DISINTEGRATING ORAL at 05:08

## 2019-08-05 RX ADMIN — DULOXETINE 30 MG: 30 CAPSULE, DELAYED RELEASE ORAL at 09:08

## 2019-08-05 RX ADMIN — BUDESONIDE 0.5 MG: 0.5 SUSPENSION RESPIRATORY (INHALATION) at 07:08

## 2019-08-05 RX ADMIN — VENLAFAXINE HYDROCHLORIDE 75 MG: 75 CAPSULE, EXTENDED RELEASE ORAL at 09:08

## 2019-08-05 RX ADMIN — ARFORMOTEROL TARTRATE 15 MCG: 15 SOLUTION RESPIRATORY (INHALATION) at 07:08

## 2019-08-05 RX ADMIN — LEVOFLOXACIN 500 MG: 500 TABLET, FILM COATED ORAL at 09:08

## 2019-08-05 RX ADMIN — OXYCODONE AND ACETAMINOPHEN 1 TABLET: 5; 325 TABLET ORAL at 09:08

## 2019-08-05 RX ADMIN — Medication 15 ML: at 08:08

## 2019-08-05 RX ADMIN — CALCIUM CARBONATE (ANTACID) CHEW TAB 500 MG 500 MG: 500 CHEW TAB at 08:08

## 2019-08-05 RX ADMIN — Medication 16 G: at 05:08

## 2019-08-05 RX ADMIN — BENZONATATE 100 MG: 100 CAPSULE ORAL at 08:08

## 2019-08-05 NOTE — SUBJECTIVE & OBJECTIVE
Past Medical History:   Diagnosis Date    Abnormal ECG 8/5/2019    Aneurysm     Anticoagulant long-term use     Arthritis     CAD in native artery 8/5/2019    COPD (chronic obstructive pulmonary disease)     Diabetes mellitus     Glaucoma     Hypertension     Seizures     Shingles 05/27/2017    Stroke        Past Surgical History:   Procedure Laterality Date    Bilateral L3/4 Transforaminal Epidural Steroid Injection Bilateral 7/23/2019    Performed by [facility] at [facility]    BRAIN SURGERY      HYSTERECTOMY         Review of patient's allergies indicates:   Allergen Reactions    Doxycycline Nausea Only     Unable to tolerate medication  Unable to tolerate medication  Unable to tolerate medication  Unable to tolerate medication  Unable to tolerate medication      Penicillins Anaphylaxis, Hives, Shortness Of Breath and Swelling    Oxycodone Other (See Comments)     sleepy  sleepy  sleepy  sleepy  sleepy      Adhesive Rash    Iodine and iodide containing products Rash     Patient denies allergy to IV dye and has had multiple CT studies and a heart cath without allergy to IV contrast or premedication.  She states allergy was to Betadine.  Patient denies allergy to IV dye and has had multiple CT studies and a heart cath without allergy to IV contrast or premedication.  She states allergy was to Betadine.      Povidone-iodine Rash    Sulfa (sulfonamide antibiotics) Itching       No current facility-administered medications on file prior to encounter.      Current Outpatient Medications on File Prior to Encounter   Medication Sig    (Magic mouthwash) 1:1:1 Benadryl 12.5mg/5ml liq, aluminum & magnesium hydroxide-simehticone (Maalox), lidocaine viscous 2% Swish and spit 15 mLs every 4 (four) hours as needed. for mouth sores    albuterol 90 mcg/actuation inhaler Inhale 2 puffs into the lungs every 4 (four) hours as needed.     albuterol-ipratropium (DUO-NEB) 2.5 mg-0.5 mg/3 mL  nebulizer solution Take 3 mLs by nebulization every 6 (six) hours as needed for Wheezing. Rescue    amLODIPine (NORVASC) 10 MG tablet Take 10 mg by mouth once daily.     clopidogrel (PLAVIX) 75 mg tablet Take 75 mg by mouth once daily.     dexlansoprazole (DEXILANT) 60 mg capsule Take 60 mg by mouth once daily.     diphenhydrAMINE (BENADRYL) 50 MG capsule Take 1 capsule (50 mg total) by mouth As instructed for Itching (take 1 tab one hour prior to test).    furosemide (LASIX) 20 MG tablet Take 10 mg by mouth 2 (two) times daily.     gemfibrozil (LOPID) 600 MG tablet Take 600 mg by mouth once daily.     hydrocodone-chlorpheniramine (TUSSIONEX) 10-8 mg/5 mL suspension Take 5 mLs by mouth every 12 (twelve) hours as needed for Cough or Congestion.    levoFLOXacin (LEVAQUIN) 750 MG tablet Take 1 tablet (750 mg total) by mouth once daily. for 14 days    metFORMIN (GLUCOPHAGE) 500 MG tablet Take 500 mg by mouth once daily.     metoprolol tartrate (LOPRESSOR) 100 MG tablet Take 100 mg by mouth 2 (two) times daily.     oxyCODONE-acetaminophen (PERCOCET) 5-325 mg per tablet Take 1 tablet by mouth every 4 to 6 hours as needed for Pain.    predniSONE (DELTASONE) 10 MG tablet 40 mg PO QD X 3 days then 30 mg PO QD x 3 days then 20 mg PO QD X 3 days then 10 mg PO QD X 3 days then 5 mg PO QD x 3 days then stop.    promethazine (PHENERGAN) 6.25 mg/5 mL syrup Take 5 mLs (6.25 mg total) by mouth every 6 (six) hours as needed (cough).    ranolazine (RANEXA) 1,000 mg Tb12 Take 1,000 mg by mouth 2 (two) times daily.     temazepam (RESTORIL) 15 mg Cap Take 15 mg by mouth every evening.     tiotropium (SPIRIVA) 18 mcg inhalation capsule Inhale 18 mcg into the lungs once daily.     tolterodine (DETROL) 2 MG Tab Take 4 mg by mouth once daily.     valsartan (DIOVAN) 320 MG tablet Take 320 mg by mouth once daily.     venlafaxine (EFFEXOR-XR) 150 MG Cp24 Take 75 mg by mouth once daily.    benzonatate (TESSALON) 100 MG  capsule Take 1 capsule (100 mg total) by mouth every 4 (four) hours as needed for Cough.    cholecalciferol, vitamin D3, (VITAMIN D3 ORAL) Take 10,000 Units by mouth. Twice weekly    DULoxetine (CYMBALTA) 30 MG capsule Take 1 capsule (30 mg total) by mouth once daily.    escitalopram oxalate (LEXAPRO) 20 MG tablet Take 20 mg by mouth once daily.    guaifenesin 100 mg/5 ml (ROBITUSSIN) 100 mg/5 mL syrup Take 5-10 mLs (100-200 mg total) by mouth every 4 (four) hours as needed for Cough.    lidocaine-prilocaine (EMLA) cream Apply topically as needed. For itching or pain to face.    naproxen (NAPROSYN) 375 MG tablet Take 1 tablet (375 mg total) by mouth 2 (two) times daily with meals.    nebulizer accessories Kit by Miscellaneous route. Use as directed    ondansetron (ZOFRAN) 4 MG tablet Take 1 tablet (4 mg total) by mouth every 6 (six) hours.    ondansetron (ZOFRAN-ODT) 4 MG TbDL Take 1 tablet (4 mg total) by mouth every 8 (eight) hours as needed (nausea/vomiting).    ranitidine (ZANTAC) 75 MG tablet Take 150 mg by mouth 2 (two) times daily.     turmeric root extract 500 mg Cap Take 500 mg by mouth 2 (two) times daily.     Family History     None        Tobacco Use    Smoking status: Former Smoker     Packs/day: 1.00     Years: 10.00     Pack years: 10.00     Types: Cigarettes     Last attempt to quit: 4/11/2004     Years since quitting: 15.3    Smokeless tobacco: Never Used   Substance and Sexual Activity    Alcohol use: No    Drug use: Never    Sexual activity: Not on file     Review of Systems   Constitutional: Positive for activity change.   HENT: Negative.    Eyes: Negative.    Respiratory: Negative.    Cardiovascular: Negative.    Gastrointestinal: Negative.    Endocrine: Negative.    Genitourinary: Negative.    Musculoskeletal: Positive for arthralgias, back pain, gait problem and myalgias.   Skin: Negative.    Allergic/Immunologic: Negative.    Hematological: Negative.    Psychiatric/Behavioral:  Negative.      Objective:     Vital Signs (Most Recent):  Temp: 97 °F (36.1 °C) (08/05/19 1157)  Pulse: 70 (08/05/19 1157)  Resp: 20 (08/05/19 1157)  BP: 93/72 (08/05/19 1157)  SpO2: 100 % (08/05/19 1157) Vital Signs (24h Range):  Temp:  [97 °F (36.1 °C)-98.1 °F (36.7 °C)] 97 °F (36.1 °C)  Pulse:  [68-99] 70  Resp:  [16-20] 20  SpO2:  [98 %-100 %] 100 %  BP: ()/(49-86) 93/72     Weight: 64.4 kg (141 lb 15.6 oz)  Body mass index is 24.37 kg/m².    Physical Exam   Constitutional: She is oriented to person, place, and time. She appears well-developed and well-nourished.   HENT:   Head: Normocephalic and atraumatic.   Eyes: Pupils are equal, round, and reactive to light. Conjunctivae and EOM are normal.   Neck: Normal range of motion. Neck supple.   Cardiovascular: Normal rate and regular rhythm.   Pulmonary/Chest: Effort normal and breath sounds normal.   Abdominal: Soft. Bowel sounds are normal.   Musculoskeletal: Normal range of motion. She exhibits edema.   Neurological: She is alert and oriented to person, place, and time.   Skin: Skin is warm and dry.   Multiple ecchymoses   Psychiatric: She has a normal mood and affect. Her behavior is normal. Judgment and thought content normal.         CRANIAL NERVES     CN III, IV, VI   Pupils are equal, round, and reactive to light.  Extraocular motions are normal.        Significant Labs:   BMP:   Recent Labs   Lab 08/05/19  0957   GLU 59*      K 3.9   *   CO2 17*   BUN 31*   CREATININE 1.3   CALCIUM 8.7     CBC:   Recent Labs   Lab 08/04/19  1615   WBC 13.89*   HGB 11.4*   HCT 35.0*          Significant Imaging: I have reviewed all pertinent imaging results/findings within the past 24 hours.

## 2019-08-05 NOTE — ASSESSMENT & PLAN NOTE
-Troponin mildly bumped at 0.034  -Elevation likely secondary to demand ischemia from UTI/bumped creatinine  -Chest pain is atypical  -Continue Plavix, BB, Ranexa  -Add statin if no contraindications  -Can f/u in clinic with OP MPI stress test

## 2019-08-05 NOTE — TELEPHONE ENCOUNTER
----- Message from Rusty Mugnuia sent at 8/2/2019  3:55 PM CDT -----  Contact: Ad[facility]  Caller is requesting a [facility] to be [facility]. Fax over to [phone].  Please give Adela a call at [phone] if there are any questions

## 2019-08-05 NOTE — NURSING
Pt BP 94/49, Pt sleeping and asymptomatic. Notified Melinda Mcarthur NP, no new orders. Will continue to monitor.

## 2019-08-05 NOTE — PLAN OF CARE
Problem: Adult Inpatient Plan of Care  Goal: Plan of Care Review  Outcome: Ongoing (interventions implemented as appropriate)  Pt remained free of injury during shift, stable condition, pain adequately controlled with PRN medication, no acute distress, receiving IV fluids, receiving antibiotics, receiving breathing treatments through respiratory therapy, bed alarm in use to prevent falls, blood glucose monitoring performed, and will continue to monitor. 24hr chart review performed.

## 2019-08-05 NOTE — SUBJECTIVE & OBJECTIVE
Past Medical History:   Diagnosis Date    Abnormal ECG 8/5/2019    Aneurysm     Anticoagulant long-term use     Arthritis     CAD in native artery 8/5/2019    COPD (chronic obstructive pulmonary disease)     Diabetes mellitus     Glaucoma     Hypertension     Seizures     Shingles 05/27/2017    Stroke        Past Surgical History:   Procedure Laterality Date    Bilateral L3/4 Transforaminal Epidural Steroid Injection Bilateral 7/23/2019    [facility]    BRAIN SURGERY      HYSTERECTOMY         Review of patient's allergies indicates:   Allergen Reactions    Doxycycline Nausea Only     Unable to tolerate medication  Unable to tolerate medication  Unable to tolerate medication  Unable to tolerate medication  Unable to tolerate medication      Penicillins Anaphylaxis, Hives, Shortness Of Breath and Swelling    Oxycodone Other (See Comments)     sleepy  sleepy  sleepy  sleepy  sleepy      Adhesive Rash    Iodine and iodide containing products Rash     Patient denies allergy to IV dye and has had multiple CT studies and a heart cath without allergy to IV contrast or premedication.  She states allergy was to Betadine.  Patient denies allergy to IV dye and has had multiple CT studies and a heart cath without allergy to IV contrast or premedication.  She states allergy was to Betadine.      Povidone-iodine Rash    Sulfa (sulfonamide antibiotics) Itching       No current facility-administered medications on file prior to encounter.      Current Outpatient Medications on File Prior to Encounter   Medication Sig    (Magic mouthwash) 1:1:1 Benadryl 12.5mg/5ml liq, aluminum & magnesium hydroxide-simehticone (Maalox), lidocaine viscous 2% Swish and spit 15 mLs every 4 (four) hours as needed. for mouth sores    albuterol 90 mcg/actuation inhaler Inhale 2 puffs into the lungs every 4 (four) hours as needed.     albuterol-ipratropium (DUO-NEB) 2.5 mg-0.5 mg/3 mL  nebulizer solution Take 3 mLs by nebulization every 6 (six) hours as needed for Wheezing. Rescue    amLODIPine (NORVASC) 10 MG tablet Take 10 mg by mouth once daily.     clopidogrel (PLAVIX) 75 mg tablet Take 75 mg by mouth once daily.     dexlansoprazole (DEXILANT) 60 mg capsule Take 60 mg by mouth once daily.     diphenhydrAMINE (BENADRYL) 50 MG capsule Take 1 capsule (50 mg total) by mouth As instructed for Itching (take 1 tab one hour prior to test).    furosemide (LASIX) 20 MG tablet Take 10 mg by mouth 2 (two) times daily.     gemfibrozil (LOPID) 600 MG tablet Take 600 mg by mouth once daily.     hydrocodone-chlorpheniramine (TUSSIONEX) 10-8 mg/5 mL suspension Take 5 mLs by mouth every 12 (twelve) hours as needed for Cough or Congestion.    levoFLOXacin (LEVAQUIN) 750 MG tablet Take 1 tablet (750 mg total) by mouth once daily. for 14 days    metFORMIN (GLUCOPHAGE) 500 MG tablet Take 500 mg by mouth once daily.     metoprolol tartrate (LOPRESSOR) 100 MG tablet Take 100 mg by mouth 2 (two) times daily.     oxyCODONE-acetaminophen (PERCOCET) 5-325 mg per tablet Take 1 tablet by mouth every 4 to 6 hours as needed for Pain.    predniSONE (DELTASONE) 10 MG tablet 40 mg PO QD X 3 days then 30 mg PO QD x 3 days then 20 mg PO QD X 3 days then 10 mg PO QD X 3 days then 5 mg PO QD x 3 days then stop.    promethazine (PHENERGAN) 6.25 mg/5 mL syrup Take 5 mLs (6.25 mg total) by mouth every 6 (six) hours as needed (cough).    ranolazine (RANEXA) 1,000 mg Tb12 Take 1,000 mg by mouth 2 (two) times daily.     temazepam (RESTORIL) 15 mg Cap Take 15 mg by mouth every evening.     tiotropium (SPIRIVA) 18 mcg inhalation capsule Inhale 18 mcg into the lungs once daily.     tolterodine (DETROL) 2 MG Tab Take 4 mg by mouth once daily.     valsartan (DIOVAN) 320 MG tablet Take 320 mg by mouth once daily.     venlafaxine (EFFEXOR-XR) 150 MG Cp24 Take 75 mg by mouth once daily.    benzonatate (TESSALON) 100 MG  capsule Take 1 capsule (100 mg total) by mouth every 4 (four) hours as needed for Cough.    cholecalciferol, vitamin D3, (VITAMIN D3 ORAL) Take 10,000 Units by mouth. Twice weekly    DULoxetine (CYMBALTA) 30 MG capsule Take 1 capsule (30 mg total) by mouth once daily.    escitalopram oxalate (LEXAPRO) 20 MG tablet Take 20 mg by mouth once daily.    guaifenesin 100 mg/5 ml (ROBITUSSIN) 100 mg/5 mL syrup Take 5-10 mLs (100-200 mg total) by mouth every 4 (four) hours as needed for Cough.    lidocaine-prilocaine (EMLA) cream Apply topically as needed. For itching or pain to face.    naproxen (NAPROSYN) 375 MG tablet Take 1 tablet (375 mg total) by mouth 2 (two) times daily with meals.    nebulizer accessories Kit by Miscellaneous route. Use as directed    ondansetron (ZOFRAN) 4 MG tablet Take 1 tablet (4 mg total) by mouth every 6 (six) hours.    ondansetron (ZOFRAN-ODT) 4 MG TbDL Take 1 tablet (4 mg total) by mouth every 8 (eight) hours as needed (nausea/vomiting).    ranitidine (ZANTAC) 75 MG tablet Take 150 mg by mouth 2 (two) times daily.     turmeric root extract 500 mg Cap Take 500 mg by mouth 2 (two) times daily.     Family History     None        Tobacco Use    Smoking status: Former Smoker     Packs/day: 1.00     Years: 10.00     Pack years: 10.00     Types: Cigarettes     Last attempt to quit: 4/11/2004     Years since quitting: 15.3    Smokeless tobacco: Never Used   Substance and Sexual Activity    Alcohol use: No    Drug use: Never    Sexual activity: Not on file     Review of Systems   Constitution: Positive for malaise/fatigue.   HENT: Negative.    Eyes: Negative.    Respiratory: Negative.    Endocrine: Negative.    Skin: Negative.    Musculoskeletal: Positive for arthritis and joint pain.   Gastrointestinal: Positive for heartburn.   Genitourinary: Negative.    Neurological: Positive for light-headedness, loss of balance and weakness.   Psychiatric/Behavioral: Negative.     Allergic/Immunologic: Negative.      Objective:     Vital Signs (Most Recent):  Temp: 97 °F (36.1 °C) (08/05/19 1157)  Pulse: 70 (08/05/19 1157)  Resp: 20 (08/05/19 1157)  BP: 93/72 (08/05/19 1157)  SpO2: 100 % (08/05/19 1157) Vital Signs (24h Range):  Temp:  [97 °F (36.1 °C)-98.1 °F (36.7 °C)] 97 °F (36.1 °C)  Pulse:  [68-99] 70  Resp:  [16-20] 20  SpO2:  [98 %-100 %] 100 %  BP: ()/(49-86) 93/72     Weight: 64.4 kg (141 lb 15.6 oz)  Body mass index is 24.37 kg/m².    SpO2: 100 %  O2 Device (Oxygen Therapy): room air      Intake/Output Summary (Last 24 hours) at 8/5/2019 1233  Last data filed at 8/5/2019 0604  Gross per 24 hour   Intake 2182.5 ml   Output 500 ml   Net 1682.5 ml       Lines/Drains/Airways     Peripheral Intravenous Line                 Peripheral IV - Single Lumen 08/05/19 0350 20 G Right Forearm less than 1 day                Physical Exam   Constitutional: She is oriented to person, place, and time. She appears well-developed and well-nourished. No distress.   HENT:   Head: Normocephalic and atraumatic.   Eyes: Pupils are equal, round, and reactive to light. Right eye exhibits no discharge. Left eye exhibits no discharge.   Neck: Neck supple. No JVD present.   Cardiovascular: Normal rate, regular rhythm, S1 normal and S2 normal.   No murmur heard.  Pulmonary/Chest: Effort normal and breath sounds normal. No respiratory distress. She has no wheezes. She has no rales.   Abdominal: Soft. She exhibits no distension.   Musculoskeletal: She exhibits no edema.   Neurological: She is alert and oriented to person, place, and time.   Skin: Skin is warm and dry. She is not diaphoretic. No erythema.   Psychiatric: She has a normal mood and affect. Her behavior is normal. Thought content normal.   Nursing note and vitals reviewed.      Significant Labs:   CMP   Recent Labs   Lab 08/04/19  1615 08/05/19  0957    137   K 4.5 3.9    111*   CO2 17* 17*    59*   BUN 46* 31*   CREATININE  1.9* 1.3   CALCIUM 9.2 8.7   PROT 6.7  --    ALBUMIN 2.9*  --    BILITOT 0.3  --    ALKPHOS 57  --    AST 14  --    ALT 13  --    ANIONGAP 14 9   ESTGFRAFRICA 30* 47*   EGFRNONAA 26* 41*   , CBC   Recent Labs   Lab 08/04/19  1615   WBC 13.89*   HGB 11.4*   HCT 35.0*      , Troponin   Recent Labs   Lab 08/04/19 2014 08/05/19  0105 08/05/19  0754   TROPONINI 0.007 0.006 0.034*    and All pertinent lab results from the last 24 hours have been reviewed.    Significant Imaging: Echocardiogram: 2D echo with color flow doppler: No results found for this or any previous visit., EKG: Reviewed and X-Ray: CXR: X-Ray Chest 1 View (CXR): No results found for this visit on 08/04/19. and X-Ray Chest PA and Lateral (CXR): No results found for this visit on 08/04/19.

## 2019-08-05 NOTE — PLAN OF CARE
Met with patient and family initial assessment completed. Patient is current with [facility] but stated that she was to here to be admitted to rehab. Per her daughter , patient lives alone and has multiple falls. Her home health therapist suggested inpatient rehab due to the patient needing more that home health could provide. Discussed options for receiving inpatient rehab services. Preference letter was obtained . Referrals were sent to [facility] and to [facility]. CM will continue to follow.  Patient denies any post hospital needs or services at this time.  Updated white board with 's name and number. Transitional Care Folder, Discharge Planning Begins on Admission pamphlet, [facility] Bedside Delivery pamphlet, Advance Directive information given to patient along with the contact information given.Instructed patient or family to call with any questions or concerns.          D/C Plan: inpatient rehab  PCP:Jossie Ac MD  Preferred Pharmacy:[facility]  Discharge transportation: facility transportation  [facility]: declined  Pharmacy Bedside Delivery:no         08/05/19 0686   Discharge Assessment   Assessment Type Discharge Planning Assessment   Confirmed/corrected address and phone number on facesheet? Yes   Assessment information obtained from? Patient;Caregiver;Medical Record   Expected Length of Stay (days)   (tbd)   Communicated expected length of stay with patient/caregiver yes   Prior to hospitilization cognitive status: Alert/Oriented   Prior to hospitalization functional status: Independent;Assistive Equipment   Current cognitive status: Alert/Oriented   Current Functional Status: Needs Assistance   Facility Arrived From: home   Lives With alone   Able to Return to Prior Arrangements   (tbd)   Is patient able to care for self after discharge? Unable to determine at this time (comments)   Who are your caregiver(s) and their phone number(s)? Audrey Goff ( daughter  ) [phone]   Patient's perception of discharge disposition home or selfcare;rehab facility   Readmission Within the Last 30 Days no previous admission in last 30 days   Patient currently being followed by outpatient case management? No   Patient currently receives any other outside agency services? No   Equipment Currently Used at Home nebulizer;glucometer   Do you have any problems affording any of your prescribed medications? No   Is the patient taking medications as prescribed? yes   Does the patient have transportation home? Yes   Transportation Anticipated family or friend will provide   Does the patient receive services at the [facility]? No   Discharge Plan A Home;Home with family;Home Health   Discharge Plan B Rehab   DME Needed Upon Discharge  walker, rolling   Patient/Family in Agreement with Plan yes

## 2019-08-05 NOTE — H&P
"[facility] Hospital Medicine  History & Physical    Patient Name: Shireen Felix  MRN: [id number]  Admission Date: 8/4/2019  Attending Physician: Juan Crouch Jr., MD   Primary Care Provider: Jossie Ac MD         Patient information was obtained from patient, parent and ER records.     Subjective:     Principal Problem: Recurrent falls   Chief Complaint:   Chief Complaint   Patient presents with    Fall     Pt states, "I fell last night and hurt my left hip."    Dysuria     Pt brother states, "Her urine is totally brown."        HPI:     73 y.o. female patient with a PMHx of seizure amongsts other co morbidities  presents to the Emergency Department for evaluation after a fall which onset last night.   She has a history of recurrent falls ,she lost her balance and fell on her left side at home last night      As a result of this fall she is sufferingleft hip pain .  This is worse with movement and  weight-bearing.      She  denies syncope. Patient  was seen at this facility by Dr. Bautista on Wednesday for evaluation  Fall.      Patient is pending placement this upcoming week in rehab.     ER evaluation was significant for wbc 13.89 , CO2 17,creatinine of 1.9  urine positive for nitrates amd hyaline cast  , mild ketones in urine     Past Medical History:   Diagnosis Date    Aneurysm     Anticoagulant long-term use     Arthritis     COPD (chronic obstructive pulmonary disease)     Diabetes mellitus     Glaucoma     Hypertension     Seizures     Shingles 05/27/2017    Stroke        Past Surgical History:   Procedure Laterality Date    Bilateral L3/4 Transforaminal Epidural Steroid Injection Bilateral 7/23/2019    Performed by D[facility]an Dias [facility] at [facility]    BRAIN SURGERY      HYSTERECTOMY         Review of patient's allergies indicates:   Allergen Reactions    Doxycycline Nausea Only     Unable to tolerate medication  Unable to tolerate medication  Unable to " tolerate medication  Unable to tolerate medication  Unable to tolerate medication      Penicillins Anaphylaxis, Hives, Shortness Of Breath and Swelling    Oxycodone Other (See Comments)     sleepy  sleepy  sleepy  sleepy  sleepy      Adhesive Rash    Iodine and iodide containing products Rash     Patient denies allergy to IV dye and has had multiple CT studies and a heart cath without allergy to IV contrast or premedication.  She states allergy was to Betadine.  Patient denies allergy to IV dye and has had multiple CT studies and a heart cath without allergy to IV contrast or premedication.  She states allergy was to Betadine.      Povidone-iodine Rash    Sulfa (sulfonamide antibiotics) Itching       No current facility-administered medications on file prior to encounter.      Current Outpatient Medications on File Prior to Encounter   Medication Sig    (Magic mouthwash) 1:1:1 Benadryl 12.5mg/5ml liq, aluminum & magnesium hydroxide-simehticone (Maalox), lidocaine viscous 2% Swish and spit 15 mLs every 4 (four) hours as needed. for mouth sores    albuterol 90 mcg/actuation inhaler Inhale 2 puffs into the lungs every 4 (four) hours as needed.     albuterol-ipratropium (DUO-NEB) 2.5 mg-0.5 mg/3 mL nebulizer solution Take 3 mLs by nebulization every 6 (six) hours as needed for Wheezing. Rescue    amLODIPine (NORVASC) 10 MG tablet Take 10 mg by mouth once daily.     clopidogrel (PLAVIX) 75 mg tablet Take 75 mg by mouth once daily.     dexlansoprazole (DEXILANT) 60 mg capsule Take 60 mg by mouth once daily.     diphenhydrAMINE (BENADRYL) 50 MG capsule Take 1 capsule (50 mg total) by mouth As instructed for Itching (take 1 tab one hour prior to test).    furosemide (LASIX) 20 MG tablet Take 10 mg by mouth 2 (two) times daily.     gemfibrozil (LOPID) 600 MG tablet Take 600 mg by mouth once daily.     hydrocodone-chlorpheniramine (TUSSIONEX) 10-8 mg/5 mL suspension Take 5 mLs by mouth every 12 (twelve) hours  as needed for Cough or Congestion.    levoFLOXacin (LEVAQUIN) 750 MG tablet Take 1 tablet (750 mg total) by mouth once daily. for 14 days    metFORMIN (GLUCOPHAGE) 500 MG tablet Take 500 mg by mouth once daily.     metoprolol tartrate (LOPRESSOR) 100 MG tablet Take 100 mg by mouth 2 (two) times daily.     oxyCODONE-acetaminophen (PERCOCET) 5-325 mg per tablet Take 1 tablet by mouth every 4 to 6 hours as needed for Pain.    predniSONE (DELTASONE) 10 MG tablet 40 mg PO QD X 3 days then 30 mg PO QD x 3 days then 20 mg PO QD X 3 days then 10 mg PO QD X 3 days then 5 mg PO QD x 3 days then stop.    promethazine (PHENERGAN) 6.25 mg/5 mL syrup Take 5 mLs (6.25 mg total) by mouth every 6 (six) hours as needed (cough).    ranolazine (RANEXA) 1,000 mg Tb12 Take 1,000 mg by mouth 2 (two) times daily.     temazepam (RESTORIL) 15 mg Cap Take 15 mg by mouth every evening.     tiotropium (SPIRIVA) 18 mcg inhalation capsule Inhale 18 mcg into the lungs once daily.     tolterodine (DETROL) 2 MG Tab Take 4 mg by mouth once daily.     valsartan (DIOVAN) 320 MG tablet Take 320 mg by mouth once daily.     venlafaxine (EFFEXOR-XR) 150 MG Cp24 Take 75 mg by mouth once daily.    benzonatate (TESSALON) 100 MG capsule Take 1 capsule (100 mg total) by mouth every 4 (four) hours as needed for Cough.    cholecalciferol, vitamin D3, (VITAMIN D3 ORAL) Take 10,000 Units by mouth. Twice weekly    DULoxetine (CYMBALTA) 30 MG capsule Take 1 capsule (30 mg total) by mouth once daily.    escitalopram oxalate (LEXAPRO) 20 MG tablet Take 20 mg by mouth once daily.    guaifenesin 100 mg/5 ml (ROBITUSSIN) 100 mg/5 mL syrup Take 5-10 mLs (100-200 mg total) by mouth every 4 (four) hours as needed for Cough.    lidocaine-prilocaine (EMLA) cream Apply topically as needed. For itching or pain to face.    naproxen (NAPROSYN) 375 MG tablet Take 1 tablet (375 mg total) by mouth 2 (two) times daily with meals.    nebulizer accessories Kit by  Miscellaneous route. Use as directed    ondansetron (ZOFRAN) 4 MG tablet Take 1 tablet (4 mg total) by mouth every 6 (six) hours.    ondansetron (ZOFRAN-ODT) 4 MG TbDL Take 1 tablet (4 mg total) by mouth every 8 (eight) hours as needed (nausea/vomiting).    ranitidine (ZANTAC) 75 MG tablet Take 150 mg by mouth 2 (two) times daily.     turmeric root extract 500 mg Cap Take 500 mg by mouth 2 (two) times daily.     Family History     None        Tobacco Use    Smoking status: Former Smoker     Packs/day: 1.00     Years: 10.00     Pack years: 10.00     Types: Cigarettes     Last attempt to quit: 4/11/2004     Years since quitting: 15.3    Smokeless tobacco: Never Used   Substance and Sexual Activity    Alcohol use: No    Drug use: Never    Sexual activity: Not on file     Review of Systems   Constitutional: Positive for activity change.   HENT: Negative.    Eyes: Negative.    Respiratory: Negative.    Cardiovascular: Negative.    Gastrointestinal: Negative.    Endocrine: Negative.    Genitourinary: Negative.    Musculoskeletal: Positive for arthralgias, back pain, gait problem and myalgias.   Skin: Negative.    Allergic/Immunologic: Negative.    Hematological: Negative.    Psychiatric/Behavioral: Negative.      Objective:     Vital Signs (Most Recent):  Temp: 98 °F (36.7 °C) (08/04/19 1522)  Pulse: 80 (08/04/19 1801)  Resp: 20 (08/04/19 1522)  BP: 103/65 (08/04/19 1801)  SpO2: 100 % (08/04/19 1801) Vital Signs (24h Range):  Temp:  [98 °F (36.7 °C)] 98 °F (36.7 °C)  Pulse:  [75-80] 80  Resp:  [20] 20  SpO2:  [99 %-100 %] 100 %  BP: (101-112)/(55-65) 103/65        Body mass index is 25.58 kg/m².    Physical Exam   Constitutional: She is oriented to person, place, and time. She appears well-developed and well-nourished.   HENT:   Head: Normocephalic and atraumatic.   Eyes: Pupils are equal, round, and reactive to light. Conjunctivae and EOM are normal.   Neck: Normal range of motion. Neck supple.   Cardiovascular:  Normal rate and regular rhythm.   Pulmonary/Chest: Effort normal and breath sounds normal.   Abdominal: Soft. Bowel sounds are normal.   Musculoskeletal: Normal range of motion. She exhibits edema.   Neurological: She is alert and oriented to person, place, and time.   Skin: Skin is warm and dry.   Multiple ecchymoses   Psychiatric: She has a normal mood and affect. Her behavior is normal. Judgment and thought content normal.         CRANIAL NERVES     CN III, IV, VI   Pupils are equal, round, and reactive to light.  Extraocular motions are normal.        Significant Labs:   BMP:   Recent Labs   Lab 08/04/19  1615         K 4.5      CO2 17*   BUN 46*   CREATININE 1.9*   CALCIUM 9.2     CBC:   Recent Labs   Lab 08/04/19  1615   WBC 13.89*   HGB 11.4*   HCT 35.0*          Significant Imaging: I have reviewed all pertinent imaging results/findings within the past 24 hours.    Assessment/Plan:     Acute cystitis   empiric abx    await cultures       COPD exacerbation  Stable   Continue nebs       DM (diabetes mellitus) type II controlled with renal manifestation  Accu checks /SSI  Hold oral hyogylcemic      Acute renal failure with acute cortical necrosis  Due to volume depletion    IVF   Monitor   Hold lasix       Recurrent falls  Admit to [facility] < 2 MN   Recurrent falls   Neuro checks   PT eval  Plan for Rehab placement       HTN (hypertension)  Fair control  resume home meds as appropriate      Focal seizures   Patient denies syncope or seizure like activity   Neuro checks with vitals     Coronary artery disease  Resume medications  Monitor for signs of cardiac ischemia       CHF (congestive heart failure)  CHF core measures         VTE Risk Mitigation (From admission, onward)        Ordered     heparin (porcine) injection 5,000 Units  Every 8 hours      08/04/19 1812             Stephenie Solano MD  Department of Hospital Medicine   [facility]

## 2019-08-05 NOTE — PROGRESS NOTES
[facility] Hospital Medicine  Progress Note    Patient Name: Shireen Felix  MRN: [id number]  Patient Class: OP- Observation   Admission Date: 8/4/2019  Length of Stay: 0 days  Attending Physician: Stephenie Solano MD  Primary Care Provider: Jossie Ac MD        Subjective:     Principal Problem:<principal problem not specified>      HPI:      73 y.o. female patient with a PMHx of seizure amongsts other co morbidities  presents to the Emergency Department for evaluation after a fall which onset last night.   She has a history of recurrent falls ,she lost her balance and fell on her left side at home last night      As a result of this fall she is sufferingleft hip pain .  This is worse with movement and  weight-bearing.      She  denies syncope. Patient  was seen at this facility by Dr. Bautista on Wednesday for evaluation  Fall.      Patient is pending placement this upcoming week in rehab.     ER evaluation was significant for wbc 13.89 , CO2 17,creatinine of 1.9  urine positive for nitrates amd hyaline cast  , mild ketones in urine     Overview/Hospital Course:  08/05   Complaints of epigastric tenderness    SOB with wheezing     Past Medical History:   Diagnosis Date    Abnormal ECG 8/5/2019    Aneurysm     Anticoagulant long-term use     Arthritis     CAD in native artery 8/5/2019    COPD (chronic obstructive pulmonary disease)     Diabetes mellitus     Glaucoma     Hypertension     Seizures     Shingles 05/27/2017    Stroke        Past Surgical History:   Procedure Laterality Date    Bilateral L3/4 Transforaminal Epidural Steroid Injection Bilateral 7/23/2019    [facility] at [facility]    BRAIN SURGERY      HYSTERECTOMY         Review of patient's allergies indicates:   Allergen Reactions    Doxycycline Nausea Only     Unable to tolerate medication  Unable to tolerate medication  Unable to tolerate medication  Unable to tolerate  medication  Unable to tolerate medication      Penicillins Anaphylaxis, Hives, Shortness Of Breath and Swelling    Oxycodone Other (See Comments)     sleepy  sleepy  sleepy  sleepy  sleepy      Adhesive Rash    Iodine and iodide containing products Rash     Patient denies allergy to IV dye and has had multiple CT studies and a heart cath without allergy to IV contrast or premedication.  She states allergy was to Betadine.  Patient denies allergy to IV dye and has had multiple CT studies and a heart cath without allergy to IV contrast or premedication.  She states allergy was to Betadine.      Povidone-iodine Rash    Sulfa (sulfonamide antibiotics) Itching       No current facility-administered medications on file prior to encounter.      Current Outpatient Medications on File Prior to Encounter   Medication Sig    (Magic mouthwash) 1:1:1 Benadryl 12.5mg/5ml liq, aluminum & magnesium hydroxide-simehticone (Maalox), lidocaine viscous 2% Swish and spit 15 mLs every 4 (four) hours as needed. for mouth sores    albuterol 90 mcg/actuation inhaler Inhale 2 puffs into the lungs every 4 (four) hours as needed.     albuterol-ipratropium (DUO-NEB) 2.5 mg-0.5 mg/3 mL nebulizer solution Take 3 mLs by nebulization every 6 (six) hours as needed for Wheezing. Rescue    amLODIPine (NORVASC) 10 MG tablet Take 10 mg by mouth once daily.     clopidogrel (PLAVIX) 75 mg tablet Take 75 mg by mouth once daily.     dexlansoprazole (DEXILANT) 60 mg capsule Take 60 mg by mouth once daily.     diphenhydrAMINE (BENADRYL) 50 MG capsule Take 1 capsule (50 mg total) by mouth As instructed for Itching (take 1 tab one hour prior to test).    furosemide (LASIX) 20 MG tablet Take 10 mg by mouth 2 (two) times daily.     gemfibrozil (LOPID) 600 MG tablet Take 600 mg by mouth once daily.     hydrocodone-chlorpheniramine (TUSSIONEX) 10-8 mg/5 mL suspension Take 5 mLs by mouth every 12 (twelve) hours as needed for Cough or Congestion.     levoFLOXacin (LEVAQUIN) 750 MG tablet Take 1 tablet (750 mg total) by mouth once daily. for 14 days    metFORMIN (GLUCOPHAGE) 500 MG tablet Take 500 mg by mouth once daily.     metoprolol tartrate (LOPRESSOR) 100 MG tablet Take 100 mg by mouth 2 (two) times daily.     oxyCODONE-acetaminophen (PERCOCET) 5-325 mg per tablet Take 1 tablet by mouth every 4 to 6 hours as needed for Pain.    predniSONE (DELTASONE) 10 MG tablet 40 mg PO QD X 3 days then 30 mg PO QD x 3 days then 20 mg PO QD X 3 days then 10 mg PO QD X 3 days then 5 mg PO QD x 3 days then stop.    promethazine (PHENERGAN) 6.25 mg/5 mL syrup Take 5 mLs (6.25 mg total) by mouth every 6 (six) hours as needed (cough).    ranolazine (RANEXA) 1,000 mg Tb12 Take 1,000 mg by mouth 2 (two) times daily.     temazepam (RESTORIL) 15 mg Cap Take 15 mg by mouth every evening.     tiotropium (SPIRIVA) 18 mcg inhalation capsule Inhale 18 mcg into the lungs once daily.     tolterodine (DETROL) 2 MG Tab Take 4 mg by mouth once daily.     valsartan (DIOVAN) 320 MG tablet Take 320 mg by mouth once daily.     venlafaxine (EFFEXOR-XR) 150 MG Cp24 Take 75 mg by mouth once daily.    benzonatate (TESSALON) 100 MG capsule Take 1 capsule (100 mg total) by mouth every 4 (four) hours as needed for Cough.    cholecalciferol, vitamin D3, (VITAMIN D3 ORAL) Take 10,000 Units by mouth. Twice weekly    DULoxetine (CYMBALTA) 30 MG capsule Take 1 capsule (30 mg total) by mouth once daily.    escitalopram oxalate (LEXAPRO) 20 MG tablet Take 20 mg by mouth once daily.    guaifenesin 100 mg/5 ml (ROBITUSSIN) 100 mg/5 mL syrup Take 5-10 mLs (100-200 mg total) by mouth every 4 (four) hours as needed for Cough.    lidocaine-prilocaine (EMLA) cream Apply topically as needed. For itching or pain to face.    naproxen (NAPROSYN) 375 MG tablet Take 1 tablet (375 mg total) by mouth 2 (two) times daily with meals.    nebulizer accessories Kit by Miscellaneous route. Use as directed     ondansetron (ZOFRAN) 4 MG tablet Take 1 tablet (4 mg total) by mouth every 6 (six) hours.    ondansetron (ZOFRAN-ODT) 4 MG TbDL Take 1 tablet (4 mg total) by mouth every 8 (eight) hours as needed (nausea/vomiting).    ranitidine (ZANTAC) 75 MG tablet Take 150 mg by mouth 2 (two) times daily.     turmeric root extract 500 mg Cap Take 500 mg by mouth 2 (two) times daily.     Family History     None        Tobacco Use    Smoking status: Former Smoker     Packs/day: 1.00     Years: 10.00     Pack years: 10.00     Types: Cigarettes     Last attempt to quit: 4/11/2004     Years since quitting: 15.3    Smokeless tobacco: Never Used   Substance and Sexual Activity    Alcohol use: No    Drug use: Never    Sexual activity: Not on file     Review of Systems   Constitutional: Positive for activity change.   HENT: Negative.    Eyes: Negative.    Respiratory: Negative.    Cardiovascular: Negative.    Gastrointestinal: Negative.    Endocrine: Negative.    Genitourinary: Negative.    Musculoskeletal: Positive for arthralgias, back pain, gait problem and myalgias.   Skin: Negative.    Allergic/Immunologic: Negative.    Hematological: Negative.    Psychiatric/Behavioral: Negative.      Objective:     Vital Signs (Most Recent):  Temp: 97 °F (36.1 °C) (08/05/19 1157)  Pulse: 70 (08/05/19 1157)  Resp: 20 (08/05/19 1157)  BP: 93/72 (08/05/19 1157)  SpO2: 100 % (08/05/19 1157) Vital Signs (24h Range):  Temp:  [97 °F (36.1 °C)-98.1 °F (36.7 °C)] 97 °F (36.1 °C)  Pulse:  [68-99] 70  Resp:  [16-20] 20  SpO2:  [98 %-100 %] 100 %  BP: ()/(49-86) 93/72     Weight: 64.4 kg (141 lb 15.6 oz)  Body mass index is 24.37 kg/m².    Physical Exam   Constitutional: She is oriented to person, place, and time. She appears well-developed and well-nourished.   HENT:   Head: Normocephalic and atraumatic.   Eyes: Pupils are equal, round, and reactive to light. Conjunctivae and EOM are normal.   Neck: Normal range of motion. Neck supple.    Cardiovascular: Normal rate and regular rhythm.   Pulmonary/Chest: Effort normal and breath sounds normal.   Abdominal: Soft. Bowel sounds are normal.   Musculoskeletal: Normal range of motion. She exhibits edema.   Neurological: She is alert and oriented to person, place, and time.   Skin: Skin is warm and dry.   Multiple ecchymoses   Psychiatric: She has a normal mood and affect. Her behavior is normal. Judgment and thought content normal.         CRANIAL NERVES     CN III, IV, VI   Pupils are equal, round, and reactive to light.  Extraocular motions are normal.        Significant Labs:   BMP:   Recent Labs   Lab 08/05/19  0957   GLU 59*      K 3.9   *   CO2 17*   BUN 31*   CREATININE 1.3   CALCIUM 8.7     CBC:   Recent Labs   Lab 08/04/19  1615   WBC 13.89*   HGB 11.4*   HCT 35.0*          Significant Imaging: I have reviewed all pertinent imaging results/findings within the past 24 hours.      Assessment/Plan:      Acute cystitis   empiric abx     cultures -NGTD      COPD exacerbation  Stable   Continue nebs       DM (diabetes mellitus) type II controlled with renal manifestation  Accu checks /SSI  Hold oral hyogylcemic      Acute renal failure with acute cortical necrosis  Due to volume depletion    improved    Recurrent falls     Recurrent falls   Neuro checks   PT eval  Plan for Rehab placement       HTN (hypertension)  Fair control  home meds as appropriate      Focal seizures   Patient denies syncope or seizure like activity   Neuro checks with vitals     Coronary artery disease  Resume medications  Monitor for signs of cardiac ischemia       CHF (congestive heart failure)  CHF core measures         VTE Risk Mitigation (From admission, onward)        Ordered     heparin (porcine) injection 5,000 Units  Every 8 hours      08/04/19 1812                Stephenie Solano MD  Department of Hospital Medicine   [facility]

## 2019-08-05 NOTE — HPI
"Ms. Felix is a 73 year old female patient whose current medical conditions include CHF, CAD, prior CVA, seizure disorder, DM type II, HTN, and ANDREI who presented to [facility] ED last night s/p fall. Patient reportedly lost her balance and fell on her left side at home. Post-fall she complained of left hip pain. She denied any associated brooks chest pain, SOB, lightheadedness, dizziness, near syncope, or syncope. Initial workup in ED revealed creatinine of 1.9, +UTI, and leukocytosis (WBC 13,000) and patient subsequently admitted for further evaluation and treatment. Overnight, patient complained of chest pain and repeat troponin was mildly elevated at 0.034. Cardiology consulted to assist with management. Patient seen and examined today, resting in bed. Reports feeling weak and fatigued. States she has had several falls over the past few weeks due to gait instability/loss of balance. Complains of substernal chest "burning". Worse with eating and swallowing. States it feels similar to when she had had bouts of thrush in the past. She reports compliance with her medications. Chart reviewed. Repeat troponin and 2D echo pending. EKG reviewed, no acute ischemic changes noted.  "

## 2019-08-05 NOTE — HOSPITAL COURSE
08/05   Complaints of epigastric tenderness    SOB with wheezing      08/06    CT of Lumbar spine -L4 and L5 nerve root impingement.     Old compression fracture of the T12 vertebral body   Continue Neuro checks   Neurosurgery consulted   Discharge held pending evaluation per neuro surgery     08/07   Patient will be discharged to Rehab   Neurosurgical  Follow up will be done outpt   Seen and examined , stable for discharge

## 2019-08-05 NOTE — PROGRESS NOTES
Pharmacist Renal Dose Adjustment Note    Shireen Felix is a 73 y.o. female being treated with the medication Levofloxacin     Patient Data:    Vital Signs (Most Recent):  Temp: 97 °F (36.1 °C) (08/05/19 1157)  Pulse: 70 (08/05/19 1157)  Resp: 20 (08/05/19 1157)  BP: 93/72 (08/05/19 1157)  SpO2: 100 % (08/05/19 1157) Vital Signs (72h Range):  Temp:  [97 °F (36.1 °C)-98.1 °F (36.7 °C)]   Pulse:  [68-99]   Resp:  [16-20]   BP: ()/(49-86)   SpO2:  [98 %-100 %]      Recent Labs   Lab 07/31/19  1207 08/04/19  1615 08/05/19  0957   CREATININE 1.5* 1.9* 1.3     Serum creatinine: 1.3 mg/dL 08/05/19 0957  Estimated creatinine clearance: 32.8 mL/min    Medication: Levaquin 500mg daily will be changed to 750mg every other day.     Pharmacist's Name: Genet Virk  Pharmacist's Extension: [phone]

## 2019-08-05 NOTE — ASSESSMENT & PLAN NOTE
-Complains of atypical chest pain that seems GI in nature  -Continue home meds- Plavix, BB, Ranexa  -Add statin if no contraindications  -Check 2D echo

## 2019-08-05 NOTE — PLAN OF CARE
Problem: Adult Inpatient Plan of Care  Goal: Plan of Care Review  Outcome: Ongoing (interventions implemented as appropriate)  POC reviewed with patient. Pt verbalized understanding  Pt remains free of injuries and falls; fall precaution in place.   intermittent complaints of pain on this shift.  IV remained intact.  IVF administered per MAR.  Accu checks ACHS. Hypoglycemic-Oral glucose administered per MAR  PRN and scheduled nebulizer tx administered throughout shift. Pulmonary hygiene encouraged.   Bed low, side rails x2, call light in reach, personal belongings at bedside.  Reminded to call for assistance.  Chart check complete. Will continue to monitor.

## 2019-08-05 NOTE — PT/OT/SLP EVAL
Occupational Therapy   Evaluation    Name: Shireen Felix  MRN: [id number]  Admitting Diagnosis:  <principal problem not specified>      Recommendations:     Discharge Recommendations: nursing facility, skilled  Discharge Equipment Recommendations:     Barriers to discharge:       Assessment:     Shireen Felix is a 73 y.o. female with a medical diagnosis of <principal problem not specified>.  She presents with   . Performance deficits affecting function:  .      Rehab Prognosis: Fair; patient would benefit from acute skilled OT services to address these deficits and reach maximum level of function.       Plan:     Patient to be seen   to address the above listed problems via    · Plan of Care Expires: 08/12/19  · Plan of Care Reviewed with:      Subjective     Chief Complaint: debility and generalized weakness  Patient/Family Comments/goals:    Occupational Profile:  Living Environment: lives alone in mobile home with ramp  Previous level of function: assistance with transfer from bed>wheelchair`  Roles and Routines: occupational therapy  Equipment Used at Home:  wheelchair  Assistance upon Discharge:       Pain/Comfort:  · Location - Side 1: Right  · Location - Orientation 1: upper    Patients cultural, spiritual, Samaritan conflicts given the current situation:      Objective:     Communicated with: nurse and epic chart review prior to session.  Patient found HOB elevated with telemetry, peripheral IV upon OT entry to room.    General Precautions: Standard,     Orthopedic Precautions:    Braces:       Occupational Performance:    Bed Mobility:    · Patient completed Rolling/Turning to Left with  moderate assistance  · Patient completed Rolling/Turning to Right with moderate assistance  · Patient completed Scooting/Bridging with maximal assistance  · Patient completed Supine to Sit with maximal assistance  · Patient completed Sit to Supine with maximal assistance    Functional  Mobility/Transfers:  · Patient completed Sit <> Stand Transfer with maximal assistance and of 2 persons  with  hand-held assist   · Functional Mobility: na    Activities of Daily Living:  · Upper Body Dressing: maximal assistance .  · Lower Body Dressing: maximal assistance .    Cognitive/Visual Perceptual:  Cognitive/Psychosocial Skills:     -       Oriented to: Person, Place, Time and Situation   -       Follows Commands/attention:Follows multistep  commands  -       Communication: clear/fluent  -       Memory: No Deficits noted  -       Safety awareness/insight to disability: impaired   Visual/Perceptual:      -Intact .    Physical Exam:  Upper Extremity Range of Motion:     -       Right Upper Extremity: Deficits: approx aarom 130 degrees.   -       Left Upper Extremity: Deficits: aarom approx 20 degerees  Upper Extremity Strength: -       Right Upper Extremity: mmt: 3/5 grossly  -       Left Upper Extremity: Deficits: mmt: 1/5 grossly   Strength:    -       Right Upper Extremity: mmt: 3/5 grossly  -       Left Upper Extremity: mmt3?5 grossly    AMPAC 6 Click ADL:  AMPAC Total Score: 15    Treatment & Education:    Education:    Patient left HOB elevated with all lines intact, call button in reach and family present    GOALS:   Multidisciplinary Problems     Occupational Therapy Goals     Not on file                History:     Past Medical History:   Diagnosis Date    Abnormal ECG 8/5/2019    Aneurysm     Anticoagulant long-term use     Arthritis     CAD in native artery 8/5/2019    COPD (chronic obstructive pulmonary disease)     Diabetes mellitus     Glaucoma     Hypertension     Seizures     Shingles 05/27/2017    Stroke        Past Surgical History:   Procedure Laterality Date    Bilateral L3/4 Transforaminal Epidural Steroid Injection Bilateral 7/23/2019    Performed by [facility] at [facility]    BRAIN SURGERY      HYSTERECTOMY         Time Tracking:     OT Date of  Treatment: 08/05/19  OT Start Time: 1108  OT Stop Time: 1131  OT Total Time (min): 23 min    Billable Minutes:Evaluation 10 minutes  Therapeutic Activity 13 minutes    Selene Wilcox OT  8/5/2019

## 2019-08-05 NOTE — PT/OT/SLP EVAL
Physical Therapy Evaluation    Patient Name:  Shireen Felix   MRN:  [id number]    Recommendations:     Discharge Recommendations:  nursing facility, skilled   Discharge Equipment Recommendations:     Barriers to discharge: Decreased caregiver support    Assessment:     Shireen Felix is a 73 y.o. female admitted with a medical diagnosis of <principal problem not specified>.  She presents with the following impairments/functional limitations:  weakness, gait instability, decreased upper extremity function, decreased lower extremity function, impaired balance, impaired endurance, impaired functional mobilty, impaired self care skills, pain, decreased safety awareness, decreased ROM .    Rehab Prognosis: Good; patient would benefit from acute skilled PT services to address these deficits and reach maximum level of function.    Recent Surgery: * No surgery found *      Plan:     During this hospitalization, patient to be seen   to address the identified rehab impairments via gait training, therapeutic activities, therapeutic exercises, wheelchair management/training and progress toward the following goals:    · Plan of Care Expires:  08/12/19    Subjective     Chief Complaint: R ARM  Patient/Family Comments/goals: INC STRENGTH  Pain/Comfort:  · Pain Rating 1: 5/10  · Location - Side 1: Right  · Location 1: arm  · Pain Rating Post-Intervention 1: 5/10    Patients cultural, spiritual, Jainism conflicts given the current situation:      Living Environment:  PT LIVES AT HOME ALONE AND HAS A RAMPT TO ENTER HOME. PT DOESN'T DRIVE  Prior to admission, patients level of function was MOD I WITH T/F TO WC. PT IS NONAMBULATORY X SEVERAL MONTHS.  Equipment used at home:  .  DME owned (not currently used): single point cane.  Upon discharge, patient will have assistance from FAMILY.    Objective:     Communicated with NURSE AND Epic CHART REVIEW prior to session.  Patient found supine with telemetry, peripheral IV  upon  PT entry to room.    General Precautions: Standard, fall   Orthopedic Precautions:N/A   Braces: N/A     Exams:  · RLE ROM: LIMITED  · RLE Strength: 3/5  · LLE ROM: LIMITED  · LLE Strength: 3/5    Functional Mobility:  PT MET IN RM SUP>SIT EOB WITH MIN A. PT SCOOTED AND ROM ASSESSED. PT SIT>STAND WITH RW AND MAX A X 2 HOWEVER UNABLE TO  FULL EXTENSION. PT COMPLETED SQUAD PIVOT T/F TO CHAIR WITH MAX A X 2. PT EDUCATED ON ROLE OF P.T. AND REC FOR SNF. PT LEFT WITH ALL NEEDS MET AND CALL BELL IN REACH.     AM-PAC 6 CLICK MOBILITY  Total Score:10     Patient left up in chair with call button in reach and chair alarm on.    GOALS:   Multidisciplinary Problems     Physical Therapy Goals        Problem: Physical Therapy Goal    Goal Priority Disciplines Outcome Goal Variances Interventions   Physical Therapy Goal     PT, PT/OT      Description:  PT WILL BE SEEN FOR P.T. FOR A MIN OF 5 OUT OF 7 DAYS A WEEK  LT19  1. PT WILL COMPLETE BED MOBILITY WITH MIN A  2. PT WILL T/F TO CHAIR WITH MOD A  3. PT WILL COMPLETE B LE TE X 20 REP                      History:     Past Medical History:   Diagnosis Date    Abnormal ECG 2019    Aneurysm     Anticoagulant long-term use     Arthritis     CAD in native artery 2019    COPD (chronic obstructive pulmonary disease)     Diabetes mellitus     Glaucoma     Hypertension     Seizures     Shingles 2017    Stroke        Past Surgical History:   Procedure Laterality Date    Bilateral L3/4 Transforaminal Epidural Steroid Injection Bilateral 2019    Performed by [facility] at [facility]    BRAIN SURGERY      HYSTERECTOMY         Time Tracking:     PT Received On: 19  PT Start Time: 745     PT Stop Time: 0810  PT Total Time (min): 25 min     Billable Minutes: Evaluation 15 and Therapeutic Activity 10      Makeda Evans, PT  2019

## 2019-08-05 NOTE — CONSULTS
"[facility]  Cardiology  Consult Note    Patient Name: Shireen Felix  MRN: [id number]  Admission Date: 8/4/2019  Hospital Length of Stay: 0 days  Code Status: No Order   Attending Provider: Stephenie Solano MD   Consulting Provider: Radha Covington PA-C  Primary Care Physician: Jossie Ac MD  Principal Problem:<principal problem not specified>    Patient information was obtained from patient, past medical records and ER records.     Inpatient consult to Cardiology  Consult performed by: Radha Covington PA-C  Consult ordered by: Stephenie Solano MD        Subjective:     Chief Complaint:  Fall    HPI:   Ms. Felix is a 73 year old female patient whose current medical conditions include CHF, CAD, prior CVA, seizure disorder, DM type II, HTN, and ANDREI who presented to [facility] ED last night s/p fall. Patient reportedly lost her balance and fell on her left side at home. Post-fall she complained of left hip pain. She denied any associated brooks chest pain, SOB, lightheadedness, dizziness, near syncope, or syncope. Initial workup in ED revealed creatinine of 1.9, +UTI, and leukocytosis (WBC 13,000) and patient subsequently admitted for further evaluation and treatment. Overnight, patient complained of chest pain and repeat troponin was mildly elevated at 0.034. Cardiology consulted to assist with management. Patient seen and examined today, resting in bed. Reports feeling weak and fatigued. States she has had several falls over the past few weeks due to gait instability/loss of balance. Complains of substernal chest "burning". Worse with eating and swallowing. States it feels similar to when she had had bouts of thrush in the past. She reports compliance with her medications. Chart reviewed. Repeat troponin and 2D echo pending. EKG reviewed, no acute ischemic changes noted.    Past Medical History:   Diagnosis Date    Abnormal ECG 8/5/2019    Aneurysm     Anticoagulant long-term use  "    Arthritis     CAD in native artery 8/5/2019    COPD (chronic obstructive pulmonary disease)     Diabetes mellitus     Glaucoma     Hypertension     Seizures     Shingles 05/27/2017    Stroke        Past Surgical History:   Procedure Laterality Date    Bilateral L3/4 Transforaminal Epidural Steroid Injection Bilateral 7/23/2019    [facility]    BRAIN SURGERY      HYSTERECTOMY         Review of patient's allergies indicates:   Allergen Reactions    Doxycycline Nausea Only     Unable to tolerate medication  Unable to tolerate medication  Unable to tolerate medication  Unable to tolerate medication  Unable to tolerate medication      Penicillins Anaphylaxis, Hives, Shortness Of Breath and Swelling    Oxycodone Other (See Comments)     sleepy  sleepy  sleepy  sleepy  sleepy      Adhesive Rash    Iodine and iodide containing products Rash     Patient denies allergy to IV dye and has had multiple CT studies and a heart cath without allergy to IV contrast or premedication.  She states allergy was to Betadine.  Patient denies allergy to IV dye and has had multiple CT studies and a heart cath without allergy to IV contrast or premedication.  She states allergy was to Betadine.      Povidone-iodine Rash    Sulfa (sulfonamide antibiotics) Itching       No current facility-administered medications on file prior to encounter.      Current Outpatient Medications on File Prior to Encounter   Medication Sig    (Magic mouthwash) 1:1:1 Benadryl 12.5mg/5ml liq, aluminum & magnesium hydroxide-simehticone (Maalox), lidocaine viscous 2% Swish and spit 15 mLs every 4 (four) hours as needed. for mouth sores    albuterol 90 mcg/actuation inhaler Inhale 2 puffs into the lungs every 4 (four) hours as needed.     albuterol-ipratropium (DUO-NEB) 2.5 mg-0.5 mg/3 mL nebulizer solution Take 3 mLs by nebulization every 6 (six) hours as needed for Wheezing. Rescue    amLODIPine  (NORVASC) 10 MG tablet Take 10 mg by mouth once daily.     clopidogrel (PLAVIX) 75 mg tablet Take 75 mg by mouth once daily.     dexlansoprazole (DEXILANT) 60 mg capsule Take 60 mg by mouth once daily.     diphenhydrAMINE (BENADRYL) 50 MG capsule Take 1 capsule (50 mg total) by mouth As instructed for Itching (take 1 tab one hour prior to test).    furosemide (LASIX) 20 MG tablet Take 10 mg by mouth 2 (two) times daily.     gemfibrozil (LOPID) 600 MG tablet Take 600 mg by mouth once daily.     hydrocodone-chlorpheniramine (TUSSIONEX) 10-8 mg/5 mL suspension Take 5 mLs by mouth every 12 (twelve) hours as needed for Cough or Congestion.    levoFLOXacin (LEVAQUIN) 750 MG tablet Take 1 tablet (750 mg total) by mouth once daily. for 14 days    metFORMIN (GLUCOPHAGE) 500 MG tablet Take 500 mg by mouth once daily.     metoprolol tartrate (LOPRESSOR) 100 MG tablet Take 100 mg by mouth 2 (two) times daily.     oxyCODONE-acetaminophen (PERCOCET) 5-325 mg per tablet Take 1 tablet by mouth every 4 to 6 hours as needed for Pain.    predniSONE (DELTASONE) 10 MG tablet 40 mg PO QD X 3 days then 30 mg PO QD x 3 days then 20 mg PO QD X 3 days then 10 mg PO QD X 3 days then 5 mg PO QD x 3 days then stop.    promethazine (PHENERGAN) 6.25 mg/5 mL syrup Take 5 mLs (6.25 mg total) by mouth every 6 (six) hours as needed (cough).    ranolazine (RANEXA) 1,000 mg Tb12 Take 1,000 mg by mouth 2 (two) times daily.     temazepam (RESTORIL) 15 mg Cap Take 15 mg by mouth every evening.     tiotropium (SPIRIVA) 18 mcg inhalation capsule Inhale 18 mcg into the lungs once daily.     tolterodine (DETROL) 2 MG Tab Take 4 mg by mouth once daily.     valsartan (DIOVAN) 320 MG tablet Take 320 mg by mouth once daily.     venlafaxine (EFFEXOR-XR) 150 MG Cp24 Take 75 mg by mouth once daily.    benzonatate (TESSALON) 100 MG capsule Take 1 capsule (100 mg total) by mouth every 4 (four) hours as needed for Cough.    cholecalciferol, vitamin  D3, (VITAMIN D3 ORAL) Take 10,000 Units by mouth. Twice weekly    DULoxetine (CYMBALTA) 30 MG capsule Take 1 capsule (30 mg total) by mouth once daily.    escitalopram oxalate (LEXAPRO) 20 MG tablet Take 20 mg by mouth once daily.    guaifenesin 100 mg/5 ml (ROBITUSSIN) 100 mg/5 mL syrup Take 5-10 mLs (100-200 mg total) by mouth every 4 (four) hours as needed for Cough.    lidocaine-prilocaine (EMLA) cream Apply topically as needed. For itching or pain to face.    naproxen (NAPROSYN) 375 MG tablet Take 1 tablet (375 mg total) by mouth 2 (two) times daily with meals.    nebulizer accessories Kit by Miscellaneous route. Use as directed    ondansetron (ZOFRAN) 4 MG tablet Take 1 tablet (4 mg total) by mouth every 6 (six) hours.    ondansetron (ZOFRAN-ODT) 4 MG TbDL Take 1 tablet (4 mg total) by mouth every 8 (eight) hours as needed (nausea/vomiting).    ranitidine (ZANTAC) 75 MG tablet Take 150 mg by mouth 2 (two) times daily.     turmeric root extract 500 mg Cap Take 500 mg by mouth 2 (two) times daily.     Family History     None        Tobacco Use    Smoking status: Former Smoker     Packs/day: 1.00     Years: 10.00     Pack years: 10.00     Types: Cigarettes     Last attempt to quit: 4/11/2004     Years since quitting: 15.3    Smokeless tobacco: Never Used   Substance and Sexual Activity    Alcohol use: No    Drug use: Never    Sexual activity: Not on file     Review of Systems   Constitution: Positive for malaise/fatigue.   HENT: Negative.    Eyes: Negative.    Respiratory: Negative.    Endocrine: Negative.    Skin: Negative.    Musculoskeletal: Positive for arthritis and joint pain.   Gastrointestinal: Positive for heartburn.   Genitourinary: Negative.    Neurological: Positive for light-headedness, loss of balance and weakness.   Psychiatric/Behavioral: Negative.    Allergic/Immunologic: Negative.      Objective:     Vital Signs (Most Recent):  Temp: 97 °F (36.1 °C) (08/05/19 1157)  Pulse: 70  (08/05/19 1157)  Resp: 20 (08/05/19 1157)  BP: 93/72 (08/05/19 1157)  SpO2: 100 % (08/05/19 1157) Vital Signs (24h Range):  Temp:  [97 °F (36.1 °C)-98.1 °F (36.7 °C)] 97 °F (36.1 °C)  Pulse:  [68-99] 70  Resp:  [16-20] 20  SpO2:  [98 %-100 %] 100 %  BP: ()/(49-86) 93/72     Weight: 64.4 kg (141 lb 15.6 oz)  Body mass index is 24.37 kg/m².    SpO2: 100 %  O2 Device (Oxygen Therapy): room air      Intake/Output Summary (Last 24 hours) at 8/5/2019 1233  Last data filed at 8/5/2019 0604  Gross per 24 hour   Intake 2182.5 ml   Output 500 ml   Net 1682.5 ml       Lines/Drains/Airways     Peripheral Intravenous Line                 Peripheral IV - Single Lumen 08/05/19 0350 20 G Right Forearm less than 1 day                Physical Exam   Constitutional: She is oriented to person, place, and time. She appears well-developed and well-nourished. No distress.   HENT:   Head: Normocephalic and atraumatic.   Eyes: Pupils are equal, round, and reactive to light. Right eye exhibits no discharge. Left eye exhibits no discharge.   Neck: Neck supple. No JVD present.   Cardiovascular: Normal rate, regular rhythm, S1 normal and S2 normal.   No murmur heard.  Pulmonary/Chest: Effort normal and breath sounds normal. No respiratory distress. She has no wheezes. She has no rales.   Abdominal: Soft. She exhibits no distension.   Musculoskeletal: She exhibits no edema.   Neurological: She is alert and oriented to person, place, and time.   Skin: Skin is warm and dry. She is not diaphoretic. No erythema.   Psychiatric: She has a normal mood and affect. Her behavior is normal. Thought content normal.   Nursing note and vitals reviewed.      Significant Labs:   CMP   Recent Labs   Lab 08/04/19  1615 08/05/19  0957    137   K 4.5 3.9    111*   CO2 17* 17*    59*   BUN 46* 31*   CREATININE 1.9* 1.3   CALCIUM 9.2 8.7   PROT 6.7  --    ALBUMIN 2.9*  --    BILITOT 0.3  --    ALKPHOS 57  --    AST 14  --    ALT 13  --     ANIONGAP 14 9   ESTGFRAFRICA 30* 47*   EGFRNONAA 26* 41*   , CBC   Recent Labs   Lab 08/04/19  1615   WBC 13.89*   HGB 11.4*   HCT 35.0*      , Troponin   Recent Labs   Lab 08/04/19 2014 08/05/19  0105 08/05/19  0754   TROPONINI 0.007 0.006 0.034*    and All pertinent lab results from the last 24 hours have been reviewed.    Significant Imaging: Echocardiogram: 2D echo with color flow doppler: No results found for this or any previous visit., EKG: Reviewed and X-Ray: CXR: X-Ray Chest 1 View (CXR): No results found for this visit on 08/04/19. and X-Ray Chest PA and Lateral (CXR): No results found for this visit on 08/04/19.    Assessment and Plan:   Patient who presents with atypical chest pain. Consider PPI/GI cocktail. Repeat troponin and check 2D echo given cardiac history.     Abnormal ECG  -No acute ischemic changes noted    CAD in native artery  -See plan under elevated troponin    Elevated troponin  -Troponin mildly bumped at 0.034  -Elevation likely secondary to demand ischemia from UTI/bumped creatinine  -Chest pain is atypical  -Continue Plavix, BB, Ranexa  -Add statin if no contraindications  -Can f/u in clinic with OP MPI stress test    Acute cystitis  -mgmt as per hospital medicine, on abx    HTN (hypertension)  -Continue home meds, BP controlled    Coronary artery disease  -Complains of atypical chest pain that seems GI in nature  -Continue home meds- Plavix, BB, Ranexa  -Add statin if no contraindications  -Check 2D echo      CHF (congestive heart failure)  -Clinically compensated  -Continue home meds  -2D echo pending        VTE Risk Mitigation (From admission, onward)        Ordered     heparin (porcine) injection 5,000 Units  Every 8 hours      08/04/19 1812          Thank you for your consult. I will follow-up with patient. Please contact us if you have any additional questions.    Radha Covington PA-C  Cardiology   [facility]    Chart reviewed. Dr. Claudio examined  patient and agrees with plan as outlined above.

## 2019-08-06 PROBLEM — M48.061 NEUROFORAMINAL STENOSIS OF LUMBAR SPINE: Status: ACTIVE | Noted: 2019-08-06

## 2019-08-06 LAB
BASOPHILS # BLD AUTO: 0 K/UL (ref 0–0.2)
BASOPHILS NFR BLD: 0 % (ref 0–1.9)
DIFFERENTIAL METHOD: ABNORMAL
EOSINOPHIL # BLD AUTO: 0 K/UL (ref 0–0.5)
EOSINOPHIL NFR BLD: 0 % (ref 0–8)
ERYTHROCYTE [DISTWIDTH] IN BLOOD BY AUTOMATED COUNT: 14.1 % (ref 11.5–14.5)
HCT VFR BLD AUTO: 33.9 % (ref 37–48.5)
HGB BLD-MCNC: 11.2 G/DL (ref 12–16)
LYMPHOCYTES # BLD AUTO: 0.5 K/UL (ref 1–4.8)
LYMPHOCYTES NFR BLD: 6.4 % (ref 18–48)
MCH RBC QN AUTO: 30.6 PG (ref 27–31)
MCHC RBC AUTO-ENTMCNC: 33 G/DL (ref 32–36)
MCV RBC AUTO: 93 FL (ref 82–98)
MONOCYTES # BLD AUTO: 0.2 K/UL (ref 0.3–1)
MONOCYTES NFR BLD: 2.3 % (ref 4–15)
NEUTROPHILS # BLD AUTO: 7.5 K/UL (ref 1.8–7.7)
NEUTROPHILS NFR BLD: 92.6 % (ref 38–73)
PLATELET # BLD AUTO: 228 K/UL (ref 150–350)
PMV BLD AUTO: 9.1 FL (ref 9.2–12.9)
POCT GLUCOSE: 145 MG/DL (ref 70–110)
POCT GLUCOSE: 156 MG/DL (ref 70–110)
POCT GLUCOSE: 183 MG/DL (ref 70–110)
POCT GLUCOSE: 185 MG/DL (ref 70–110)
RBC # BLD AUTO: 3.66 M/UL (ref 4–5.4)
WBC # BLD AUTO: 8.22 K/UL (ref 3.9–12.7)

## 2019-08-06 PROCEDURE — 25000242 PHARM REV CODE 250 ALT 637 W/ HCPCS: Performed by: EMERGENCY MEDICINE

## 2019-08-06 PROCEDURE — 96372 THER/PROPH/DIAG INJ SC/IM: CPT | Mod: 59 | Performed by: EMERGENCY MEDICINE

## 2019-08-06 PROCEDURE — 36415 COLL VENOUS BLD VENIPUNCTURE: CPT

## 2019-08-06 PROCEDURE — 25000242 PHARM REV CODE 250 ALT 637 W/ HCPCS: Performed by: INTERNAL MEDICINE

## 2019-08-06 PROCEDURE — 97110 THERAPEUTIC EXERCISES: CPT

## 2019-08-06 PROCEDURE — 63600175 PHARM REV CODE 636 W HCPCS: Performed by: INTERNAL MEDICINE

## 2019-08-06 PROCEDURE — 94640 AIRWAY INHALATION TREATMENT: CPT

## 2019-08-06 PROCEDURE — 85025 COMPLETE CBC W/AUTO DIFF WBC: CPT

## 2019-08-06 PROCEDURE — 96374 THER/PROPH/DIAG INJ IV PUSH: CPT | Performed by: EMERGENCY MEDICINE

## 2019-08-06 PROCEDURE — S0028 INJECTION, FAMOTIDINE, 20 MG: HCPCS | Performed by: INTERNAL MEDICINE

## 2019-08-06 PROCEDURE — 99900035 HC TECH TIME PER 15 MIN (STAT)

## 2019-08-06 PROCEDURE — 97530 THERAPEUTIC ACTIVITIES: CPT

## 2019-08-06 PROCEDURE — 96376 TX/PRO/DX INJ SAME DRUG ADON: CPT | Performed by: EMERGENCY MEDICINE

## 2019-08-06 PROCEDURE — G0378 HOSPITAL OBSERVATION PER HR: HCPCS

## 2019-08-06 PROCEDURE — 25000003 PHARM REV CODE 250: Performed by: INTERNAL MEDICINE

## 2019-08-06 RX ORDER — PROMETHAZINE HYDROCHLORIDE AND CODEINE PHOSPHATE 6.25; 1 MG/5ML; MG/5ML
5 SOLUTION ORAL EVERY 8 HOURS PRN
Status: DISCONTINUED | OUTPATIENT
Start: 2019-08-06 | End: 2019-08-07 | Stop reason: HOSPADM

## 2019-08-06 RX ORDER — PANTOPRAZOLE SODIUM 40 MG/10ML
40 INJECTION, POWDER, LYOPHILIZED, FOR SOLUTION INTRAVENOUS DAILY
Status: DISCONTINUED | OUTPATIENT
Start: 2019-08-06 | End: 2019-08-06 | Stop reason: CLARIF

## 2019-08-06 RX ORDER — FAMOTIDINE 20 MG/50ML
20 INJECTION, SOLUTION INTRAVENOUS DAILY
Status: DISCONTINUED | OUTPATIENT
Start: 2019-08-06 | End: 2019-08-07

## 2019-08-06 RX ADMIN — ALUMINUM HYDROXIDE, MAGNESIUM HYDROXIDE, AND SIMETHICONE: 200; 200; 20 SUSPENSION ORAL at 12:08

## 2019-08-06 RX ADMIN — VENLAFAXINE HYDROCHLORIDE 75 MG: 75 CAPSULE, EXTENDED RELEASE ORAL at 08:08

## 2019-08-06 RX ADMIN — METOPROLOL TARTRATE 100 MG: 50 TABLET ORAL at 09:08

## 2019-08-06 RX ADMIN — METHYLPREDNISOLONE SODIUM SUCCINATE 40 MG: 40 INJECTION, POWDER, FOR SOLUTION INTRAMUSCULAR; INTRAVENOUS at 11:08

## 2019-08-06 RX ADMIN — ONDANSETRON 4 MG: 4 TABLET, ORALLY DISINTEGRATING ORAL at 05:08

## 2019-08-06 RX ADMIN — INSULIN ASPART 2 UNITS: 100 INJECTION, SOLUTION INTRAVENOUS; SUBCUTANEOUS at 05:08

## 2019-08-06 RX ADMIN — METOPROLOL TARTRATE 100 MG: 50 TABLET ORAL at 08:08

## 2019-08-06 RX ADMIN — PROMETHAZINE HYDROCHLORIDE AND CODEINE PHOSPHATE 5 ML: 10; 6.25 SOLUTION ORAL at 08:08

## 2019-08-06 RX ADMIN — DULOXETINE 30 MG: 30 CAPSULE, DELAYED RELEASE ORAL at 08:08

## 2019-08-06 RX ADMIN — ARFORMOTEROL TARTRATE 15 MCG: 15 SOLUTION RESPIRATORY (INHALATION) at 07:08

## 2019-08-06 RX ADMIN — RANOLAZINE 1000 MG: 500 TABLET, FILM COATED, EXTENDED RELEASE ORAL at 09:08

## 2019-08-06 RX ADMIN — METHYLPREDNISOLONE SODIUM SUCCINATE 40 MG: 40 INJECTION, POWDER, FOR SOLUTION INTRAMUSCULAR; INTRAVENOUS at 12:08

## 2019-08-06 RX ADMIN — FAMOTIDINE 20 MG: 20 TABLET ORAL at 08:08

## 2019-08-06 RX ADMIN — INSULIN ASPART 2 UNITS: 100 INJECTION, SOLUTION INTRAVENOUS; SUBCUTANEOUS at 12:08

## 2019-08-06 RX ADMIN — Medication 15 ML: at 10:08

## 2019-08-06 RX ADMIN — IPRATROPIUM BROMIDE 0.5 MG: 0.5 SOLUTION RESPIRATORY (INHALATION) at 07:08

## 2019-08-06 RX ADMIN — FAMOTIDINE 20 MG: 20 INJECTION, SOLUTION INTRAVENOUS at 01:08

## 2019-08-06 RX ADMIN — AMLODIPINE BESYLATE 10 MG: 10 TABLET ORAL at 08:08

## 2019-08-06 RX ADMIN — BUDESONIDE 0.5 MG: 0.5 SUSPENSION RESPIRATORY (INHALATION) at 07:08

## 2019-08-06 RX ADMIN — PROMETHAZINE HYDROCHLORIDE AND CODEINE PHOSPHATE 5 ML: 10; 6.25 SOLUTION ORAL at 11:08

## 2019-08-06 RX ADMIN — HEPARIN SODIUM 5000 UNITS: 5000 INJECTION, SOLUTION INTRAVENOUS; SUBCUTANEOUS at 01:08

## 2019-08-06 RX ADMIN — CLOPIDOGREL BISULFATE 75 MG: 75 TABLET ORAL at 08:08

## 2019-08-06 RX ADMIN — ESCITALOPRAM OXALATE 20 MG: 10 TABLET, FILM COATED ORAL at 08:08

## 2019-08-06 RX ADMIN — HEPARIN SODIUM 5000 UNITS: 5000 INJECTION, SOLUTION INTRAVENOUS; SUBCUTANEOUS at 09:08

## 2019-08-06 RX ADMIN — INSULIN ASPART 1 UNITS: 100 INJECTION, SOLUTION INTRAVENOUS; SUBCUTANEOUS at 11:08

## 2019-08-06 RX ADMIN — GEMFIBROZIL 600 MG: 600 TABLET ORAL at 08:08

## 2019-08-06 RX ADMIN — ONDANSETRON 4 MG: 4 TABLET, ORALLY DISINTEGRATING ORAL at 11:08

## 2019-08-06 RX ADMIN — OXYCODONE AND ACETAMINOPHEN 1 TABLET: 5; 325 TABLET ORAL at 10:08

## 2019-08-06 RX ADMIN — ALUMINUM HYDROXIDE, MAGNESIUM HYDROXIDE, AND SIMETHICONE 30 ML: 200; 200; 20 SUSPENSION ORAL at 09:08

## 2019-08-06 RX ADMIN — HEPARIN SODIUM 5000 UNITS: 5000 INJECTION, SOLUTION INTRAVENOUS; SUBCUTANEOUS at 05:08

## 2019-08-06 RX ADMIN — OXYBUTYNIN CHLORIDE 10 MG: 5 TABLET, EXTENDED RELEASE ORAL at 08:08

## 2019-08-06 RX ADMIN — ONDANSETRON 4 MG: 4 TABLET, ORALLY DISINTEGRATING ORAL at 12:08

## 2019-08-06 RX ADMIN — RAMELTEON 8 MG: 8 TABLET, FILM COATED ORAL at 09:08

## 2019-08-06 RX ADMIN — IPRATROPIUM BROMIDE 0.5 MG: 0.5 SOLUTION RESPIRATORY (INHALATION) at 12:08

## 2019-08-06 RX ADMIN — METHYLPREDNISOLONE SODIUM SUCCINATE 40 MG: 40 INJECTION, POWDER, FOR SOLUTION INTRAMUSCULAR; INTRAVENOUS at 05:08

## 2019-08-06 RX ADMIN — PROMETHAZINE HYDROCHLORIDE AND CODEINE PHOSPHATE 5 ML: 10; 6.25 SOLUTION ORAL at 12:08

## 2019-08-06 RX ADMIN — RANOLAZINE 1000 MG: 500 TABLET, FILM COATED, EXTENDED RELEASE ORAL at 08:08

## 2019-08-06 RX ADMIN — IPRATROPIUM BROMIDE 0.5 MG: 0.5 SOLUTION RESPIRATORY (INHALATION) at 01:08

## 2019-08-06 NOTE — PLAN OF CARE
Problem: Adult Inpatient Plan of Care  Goal: Plan of Care Review  Outcome: Ongoing (interventions implemented as appropriate)  Pt a/a/ox3  VS stable   Remained free from fall/injury this shift  Educated on fall risk  No pain reported this shift  Pt had some coughing during shift unresolved by benzonatate, received order for phenergan/codiene which helped  Pt Rested well during shift, pt moves around frequently in bed  Pt glucose controlled, will recheck later this morning

## 2019-08-06 NOTE — PROGRESS NOTES
[facility] Hospital Medicine  Progress Note    Patient Name: Shireen Felix  MRN: [id number]  Patient Class: OP- Observation   Admission Date: 8/4/2019  Length of Stay: 0 days  Attending Physician: Stephenie Solano MD  Primary Care Provider: Jossie Ac MD        Subjective:     Principal Problem:<principal problem not specified>      HPI:      73 y.o. female patient with a PMHx of seizure amongsts other co morbidities  presents to the Emergency Department for evaluation after a fall which onset last night.   She has a history of recurrent falls ,she lost her balance and fell on her left side at home last night      As a result of this fall she is sufferingleft hip pain .  This is worse with movement and  weight-bearing.      She  denies syncope. Patient  was seen at this facility by Dr. Bautista on Wednesday for evaluation  Fall.      Patient is pending placement this upcoming week in rehab.     ER evaluation was significant for wbc 13.89 , CO2 17,creatinine of 1.9  urine positive for nitrates amd hyaline cast  , mild ketones in urine     Overview/Hospital Course:  08/05   Complaints of epigastric tenderness    SOB with wheezing      08/06    CT of Lumbar spine -L4 and L5 nerve root impingement.  Old compression fracture of the T12 vertebral body    Past Medical History:   Diagnosis Date    Abnormal ECG 8/5/2019    Aneurysm     Anticoagulant long-term use     Arthritis     CAD in native artery 8/5/2019    COPD (chronic obstructive pulmonary disease)     Diabetes mellitus     Glaucoma     Hypertension     Seizures     Shingles 05/27/2017    Stroke        Past Surgical History:   Procedure Laterality Date    Bilateral L3/4 Transforaminal Epidural Steroid Injection Bilateral 7/23/2019    Performed [facility]    BRAIN SURGERY      HYSTERECTOMY         Review of patient's allergies indicates:   Allergen Reactions    Doxycycline Nausea Only      Unable to tolerate medication  Unable to tolerate medication  Unable to tolerate medication  Unable to tolerate medication  Unable to tolerate medication      Penicillins Anaphylaxis, Hives, Shortness Of Breath and Swelling    Oxycodone Other (See Comments)     sleepy  sleepy  sleepy  sleepy  sleepy      Adhesive Rash    Iodine and iodide containing products Rash     Patient denies allergy to IV dye and has had multiple CT studies and a heart cath without allergy to IV contrast or premedication.  She states allergy was to Betadine.  Patient denies allergy to IV dye and has had multiple CT studies and a heart cath without allergy to IV contrast or premedication.  She states allergy was to Betadine.      Povidone-iodine Rash    Sulfa (sulfonamide antibiotics) Itching       No current facility-administered medications on file prior to encounter.      Current Outpatient Medications on File Prior to Encounter   Medication Sig    (Magic mouthwash) 1:1:1 Benadryl 12.5mg/5ml liq, aluminum & magnesium hydroxide-simehticone (Maalox), lidocaine viscous 2% Swish and spit 15 mLs every 4 (four) hours as needed. for mouth sores    albuterol 90 mcg/actuation inhaler Inhale 2 puffs into the lungs every 4 (four) hours as needed.     albuterol-ipratropium (DUO-NEB) 2.5 mg-0.5 mg/3 mL nebulizer solution Take 3 mLs by nebulization every 6 (six) hours as needed for Wheezing. Rescue    amLODIPine (NORVASC) 10 MG tablet Take 10 mg by mouth once daily.     clopidogrel (PLAVIX) 75 mg tablet Take 75 mg by mouth once daily.     dexlansoprazole (DEXILANT) 60 mg capsule Take 60 mg by mouth once daily.     diphenhydrAMINE (BENADRYL) 50 MG capsule Take 1 capsule (50 mg total) by mouth As instructed for Itching (take 1 tab one hour prior to test).    furosemide (LASIX) 20 MG tablet Take 10 mg by mouth 2 (two) times daily.     gemfibrozil (LOPID) 600 MG tablet Take 600 mg by mouth once daily.     hydrocodone-chlorpheniramine  (TUSSIONEX) 10-8 mg/5 mL suspension Take 5 mLs by mouth every 12 (twelve) hours as needed for Cough or Congestion.    levoFLOXacin (LEVAQUIN) 750 MG tablet Take 1 tablet (750 mg total) by mouth once daily. for 14 days    metFORMIN (GLUCOPHAGE) 500 MG tablet Take 500 mg by mouth once daily.     metoprolol tartrate (LOPRESSOR) 100 MG tablet Take 100 mg by mouth 2 (two) times daily.     oxyCODONE-acetaminophen (PERCOCET) 5-325 mg per tablet Take 1 tablet by mouth every 4 to 6 hours as needed for Pain.    predniSONE (DELTASONE) 10 MG tablet 40 mg PO QD X 3 days then 30 mg PO QD x 3 days then 20 mg PO QD X 3 days then 10 mg PO QD X 3 days then 5 mg PO QD x 3 days then stop.    promethazine (PHENERGAN) 6.25 mg/5 mL syrup Take 5 mLs (6.25 mg total) by mouth every 6 (six) hours as needed (cough).    ranolazine (RANEXA) 1,000 mg Tb12 Take 1,000 mg by mouth 2 (two) times daily.     temazepam (RESTORIL) 15 mg Cap Take 15 mg by mouth every evening.     tiotropium (SPIRIVA) 18 mcg inhalation capsule Inhale 18 mcg into the lungs once daily.     tolterodine (DETROL) 2 MG Tab Take 4 mg by mouth once daily.     valsartan (DIOVAN) 320 MG tablet Take 320 mg by mouth once daily.     venlafaxine (EFFEXOR-XR) 150 MG Cp24 Take 75 mg by mouth once daily.    benzonatate (TESSALON) 100 MG capsule Take 1 capsule (100 mg total) by mouth every 4 (four) hours as needed for Cough.    cholecalciferol, vitamin D3, (VITAMIN D3 ORAL) Take 10,000 Units by mouth. Twice weekly    DULoxetine (CYMBALTA) 30 MG capsule Take 1 capsule (30 mg total) by mouth once daily.    escitalopram oxalate (LEXAPRO) 20 MG tablet Take 20 mg by mouth once daily.    guaifenesin 100 mg/5 ml (ROBITUSSIN) 100 mg/5 mL syrup Take 5-10 mLs (100-200 mg total) by mouth every 4 (four) hours as needed for Cough.    lidocaine-prilocaine (EMLA) cream Apply topically as needed. For itching or pain to face.    naproxen (NAPROSYN) 375 MG tablet Take 1 tablet (375 mg  total) by mouth 2 (two) times daily with meals.    nebulizer accessories Kit by Miscellaneous route. Use as directed    ondansetron (ZOFRAN) 4 MG tablet Take 1 tablet (4 mg total) by mouth every 6 (six) hours.    ondansetron (ZOFRAN-ODT) 4 MG TbDL Take 1 tablet (4 mg total) by mouth every 8 (eight) hours as needed (nausea/vomiting).    ranitidine (ZANTAC) 75 MG tablet Take 150 mg by mouth 2 (two) times daily.     turmeric root extract 500 mg Cap Take 500 mg by mouth 2 (two) times daily.     Family History     None        Tobacco Use    Smoking status: Former Smoker     Packs/day: 1.00     Years: 10.00     Pack years: 10.00     Types: Cigarettes     Last attempt to quit: 4/11/2004     Years since quitting: 15.3    Smokeless tobacco: Never Used   Substance and Sexual Activity    Alcohol use: No    Drug use: Never    Sexual activity: Not on file     Review of Systems   Constitutional: Positive for activity change.   HENT: Negative.    Eyes: Negative.    Respiratory: Negative.    Cardiovascular: Negative.    Gastrointestinal: Negative.    Endocrine: Negative.    Genitourinary: Negative.    Musculoskeletal: Positive for arthralgias, back pain, gait problem and myalgias.   Skin: Negative.    Allergic/Immunologic: Negative.    Hematological: Negative.    Psychiatric/Behavioral: Negative.      Objective:     Vital Signs (Most Recent):  Temp: 98 °F (36.7 °C) (08/06/19 0738)  Pulse: 71 (08/06/19 1306)  Resp: 16 (08/06/19 1306)  BP: (!) 145/65 (08/06/19 0738)  SpO2: 99 % (08/06/19 1306) Vital Signs (24h Range):  Temp:  [97.9 °F (36.6 °C)-98.4 °F (36.9 °C)] 98 °F (36.7 °C)  Pulse:  [60-86] 71  Resp:  [12-20] 16  SpO2:  [95 %-100 %] 99 %  BP: (112-145)/(56-65) 145/65     Weight: 64.4 kg (141 lb 15.6 oz)  Body mass index is 24.37 kg/m².    Physical Exam   Constitutional: She is oriented to person, place, and time. She appears well-developed and well-nourished.   HENT:   Head: Normocephalic and atraumatic.   Eyes: Pupils  are equal, round, and reactive to light. Conjunctivae and EOM are normal.   Neck: Normal range of motion. Neck supple.   Cardiovascular: Normal rate and regular rhythm.   Pulmonary/Chest: Effort normal. She has wheezes.   Abdominal: Soft. Bowel sounds are normal.   Musculoskeletal: Normal range of motion. She exhibits edema.   Neurological: She is alert and oriented to person, place, and time.   Skin: Skin is warm and dry.   Multiple ecchymoses   Psychiatric: She has a normal mood and affect. Her behavior is normal. Judgment and thought content normal.         CRANIAL NERVES     CN III, IV, VI   Pupils are equal, round, and reactive to light.  Extraocular motions are normal.        Significant Labs:   BMP:   Recent Labs   Lab 08/05/19  0957   GLU 59*      K 3.9   *   CO2 17*   BUN 31*   CREATININE 1.3   CALCIUM 8.7     CBC:   Recent Labs   Lab 08/04/19  1615 08/06/19  0715   WBC 13.89* 8.22   HGB 11.4* 11.2*   HCT 35.0* 33.9*    228       Significant Imaging: I have reviewed all pertinent imaging results/findings within the past 24 hours.      Assessment/Plan:      Neuroforaminal stenosis of lumbar spine  Continue PT eval   Neurosurgery consulted       Acute cystitis   empiric abx     cultures -NGTD      COPD exacerbation  Duonebs /Steroids      DM (diabetes mellitus) type II controlled with renal manifestation  Accu checks /SSI  Hold oral hyogylcemic      Acute renal failure with acute cortical necrosis  Due to volume depletion    resolved    Recurrent falls     Recurrent falls   Neuro checks   PT eval  Plan for Rehab placement       HTN (hypertension)  Fair control  home meds as appropriate      Focal seizures   Patient denies syncope or seizure like activity   Neuro checks with vitals     Coronary artery disease    Monitor for signs of cardiac ischemia       CHF (congestive heart failure)  2D ECHO -normal systolic , diastolic function  CHF core measures         VTE Risk Mitigation (From  admission, onward)        Ordered     heparin (porcine) injection 5,000 Units  Every 8 hours      08/04/19 1812                Stephenie Solano MD  Department of Hospital Medicine   [facility]

## 2019-08-06 NOTE — PLAN OF CARE
Problem: Adult Inpatient Plan of Care  Goal: Plan of Care Review  Outcome: Ongoing (interventions implemented as appropriate)  Patient remains free of falls and injuries. Blood glucose is being monitored. Pain controlled by oral pain medication. Pt reports constant feeling of cough and congestion in chest. GI cocktail given to help burning feeling of throat. Symptoms of UTI are improving, pt using bedpan. Tolerating regular diet.

## 2019-08-06 NOTE — PLAN OF CARE
Referrals were sent through [facility] to [facility] ( referral process started by patient's home health ) Alyssa , Admissions at [facility] they will accept patient today. Explained that patient complained about her back pain and that the provider is ordering imaging. Discharge would be pending results of the imaging.    Imaging completed. Per provider patient will need a neurosurgery consult for nerve impingement. Patient will not discharge today. Update to Trista, patient's daughter. Trista asked if they will do her shoulder surgery while she is here . Discussed the importance of the surgery consult for her nerve impingement and that the patient indicated that she was told to follow up outpatient for her shoulder when she was in the ed. Trista stated that they were told about the follow up but have not had a chance. Discussed with Dr Solano. Above information shared with [facility]. CM will continue to follow.       08/06/19 4821   Post-Acute Status   Post-Acute Authorization Placement   Post-Acute Placement Status Referrals Sent

## 2019-08-06 NOTE — PT/OT/SLP PROGRESS
Occupational Therapy  Treatment    Shireen Felix   MRN: [id number]   Admitting Diagnosis: <principal problem not specified>    OT Date of Treatment: 08/06/19   OT Start Time: 0946  OT Stop Time: 1024  OT Total Time (min): 38 min    Billable Minutes:  Therapeutic Activity 25 MINUTES and Therapeutic Exercise 13 MINUTES    General Precautions: Standard, fall  Orthopedic Precautions: N/A  Braces: N/A         Subjective:  Communicated with NURSE AND Epic CHART REVIEW prior to session.    Pain/Comfort  Pain Rating 1: 6/10  Location - Side 1: Right  Location - Orientation 1: upper  Location 1: abdomen    Objective:  Patient found with: telemetry, peripheral IV     Functional Mobility:  Bed Mobility:  MOD A     Transfers:   MAX A  WITH FORWARD SCOOTING AND MAX A X 2 WITH BED<>BED SIDE CHAIR WITH SQUAT TRANSFER      Activities of Daily Living:     Feeding adaptive equipment: NA     UE adaptive equipment: MOD A      LE adaptive equipment: MAX A       Bathing adaptive equipment: NA  BALANCE:   Static Sit: FAIR: Maintains without assist, but unable to take any challenges   Dynamic Sit: FAIR: Cannot move trunk without losing balance  Static Stand: 0: Needs MAXIMAL assist to maintain   Dynamic stand: 0: N/A    Therapeutic Activities and Exercises:  PT PERFORMED 1 SET X 10 REPS RUE SELF ASSIST AND L UE ROM EXERCISE (SHOULDER FLEXION; ELBOW FLEXION/EXT; HAND /DIGITS ROM/ EXT)    AM-PAC 6 CLICK ADL   How much help from another person does this patient currently need?   1 = Unable, Total/Dependent Assistance  2 = A lot, Maximum/Moderate Assistance  3 = A little, Minimum/Contact Guard/Supervision  4 = None, Modified [place]/Independent    Putting on and taking off regular lower body clothing? : 2  Bathing (including washing, rinsing, drying)?: 2  Toileting, which includes using toilet, bedpan, or urinal? : 2  Putting on and taking off regular upper body clothing?: 2  Taking care of personal grooming such as brushing teeth?:  "3  Eating meals?: 4  Daily Activity Total Score: 15     AM-PAC Raw Score CMS "G-Code Modifier Level of Impairment Assistance   6 % Total / Unable   7 - 8 CM 80 - 100% Maximal Assist   9-13 CL 60 - 80% Moderate Assist   14 - 19 CK 40 - 60% Moderate Assist   20 - 22 CJ 20 - 40% Minimal Assist   23 CI 1-20% SBA / CGA   24 CH 0% Independent/ Mod I       Patient left HOB elevated with all lines intact, call button in reach, bed alarm on, NURSE notified and FAMILY present    ASSESSMENT:  Shireen Felix is a 73 y.o. female with a medical diagnosis of <principal problem not specified> and presents with DEBILITY AND GENERALIZED WEAKNESS. PT WILL CONTINUE TO BENEFIT FROM SKILLED OT    Rehab identified problem list/impairments: Rehab identified problem list/impairments: weakness, impaired self care skills, impaired balance, decreased safety awareness, impaired endurance, impaired functional mobilty, decreased upper extremity function, gait instability    Rehab potential is good.    Activity tolerance: Good    Discharge recommendations: Discharge Facility/Level of Care Needs: nursing facility, skilled     Barriers to discharge: Barriers to Discharge: None    Equipment recommendations: (TBD)     GOALS:   Multidisciplinary Problems     Occupational Therapy Goals        Problem: Occupational Therapy Goal    Goal Priority Disciplines Outcome Interventions   Occupational Therapy Goal     OT, PT/OT Ongoing (interventions implemented as appropriate)    Description:  OT GOALS TO BE MET BY 8-9-19  PT WILL TOLERATE 1 SET X 15 REPS B UE ROM EXERCISE  PT WILL REQ MIN A  WITH UE DRESSING  PT WILL REQ MIN A WITH LE DRESSING  PT WILL REQ MOD A WITH BSC T/F'S                    Plan:  Patient to be seen 3 x/week to address the above listed problems via self-care/home management, therapeutic exercises, therapeutic activities  Plan of Care expires: 08/12/19  Plan of Care reviewed with: patient, daughter         Selene Wilcox, " OT  08/06/2019

## 2019-08-06 NOTE — PT/OT/SLP PROGRESS
Physical Therapy  Treatment    Shireen Felix   MRN: [id number]   Admitting Diagnosis: <principal problem not specified>    PT Received On: 08/06/19  PT Start Time: 1040     PT Stop Time: 1105    PT Total Time (min): 25 min       Billable Minutes:  Therapeutic Activity 15 and Therapeutic Exercise 10    Treatment Type: Treatment  PT/PTA: PT             General Precautions: Standard, fall  Orthopedic Precautions: N/A   Braces: N/A         Subjective:  Communicated with NURSE FORTE AND Epic CHART REVIEW prior to session.  PT AGREED TO TX     Pain/Comfort  Pain Rating 1: 6/10  Location 1: abdomen  Pain Rating Post-Intervention 1: 6/10    Objective:   Patient found with: peripheral IV, telemetry    Functional Mobility:  PT MET IN RM  SUP.SIT EOB WITH MIN A AND SCOOTED TO EOB WITH CGA. PT STOOD WITH RW AND MAX A X 2 WITH B KNEES BUCKLING AND DEC USE OF R UE. PT RETURNED SEATED EOB FOR REST. PT COMPLETED SQUAT PIVOT T/F TO CHAIR WITH MAX A X2. PT SEATED IN CHAIR B LE TE 2X10 REPS OF AP, TKE AND MIP WITH LIMITED ROM.      AM-PAC 6 CLICK MOBILITY  How much help from another person does this patient currently need?   1 = Unable, Total/Dependent Assistance  2 = A lot, Maximum/Moderate Assistance  3 = A little, Minimum/Contact Guard/Supervision  4 = None, Modified [place]/Independent    Turning over in bed (including adjusting bedclothes, sheets and blankets)?: 4  Sitting down on and standing up from a chair with arms (e.g., wheelchair, bedside commode, etc.): 2  Moving from lying on back to sitting on the side of the bed?: 4  Moving to and from a bed to a chair (including a wheelchair)?: 2  Need to walk in hospital room?: 1  Climbing 3-5 steps with a railing?: 1  Basic Mobility Total Score: 14    AM-PAC Raw Score CMS G-Code Modifier Level of Impairment Assistance   6 % Total / Unable   7 - 9 CM 80 - 100% Maximal Assist   10 - 14 CL 60 - 80% Moderate Assist   15 - 19 CK 40 - 60% Moderate Assist   20 - 22 CJ 20 -  40% Minimal Assist   23 CI 1-20% SBA / CGA   24 CH 0% Independent/ Mod I     Patient left up in chair with call button in reach, chair alarm on and DAUGHTER present.    Assessment:  PT BEATRIZ TX WITH INC FATIGUE AND PAIN    Rehab identified problem list/impairments: Rehab identified problem list/impairments: weakness, impaired endurance, impaired functional mobilty, gait instability, impaired self care skills, impaired balance, decreased lower extremity function, decreased upper extremity function, pain, decreased ROM, decreased safety awareness    Rehab potential is fair.    Activity tolerance: FAIR    Discharge recommendations: Discharge Facility/Level of Care Needs: nursing facility, skilled     Barriers to discharge:      Equipment recommendations: Equipment Needed After Discharge: (TBD)     GOALS:   Multidisciplinary Problems     Physical Therapy Goals        Problem: Physical Therapy Goal    Goal Priority Disciplines Outcome Goal Variances Interventions   Physical Therapy Goal     PT, PT/OT Ongoing (interventions implemented as appropriate)     Description:  PT WILL BE SEEN FOR P.T. FOR A MIN OF 5 OUT OF 7 DAYS A WEEK  LT19  1. PT WILL COMPLETE BED MOBILITY WITH MIN A  2. PT WILL T/F TO CHAIR WITH MOD A  3. PT WILL COMPLETE B LE TE X 20 REP                      PLAN:    Patient to be seen to address the above listed problems via gait training, therapeutic activities, therapeutic exercises, wheelchair management/training  Plan of Care expires: 19  Plan of Care reviewed with: patient, daughter         Makeda Evans, PT  2019

## 2019-08-06 NOTE — PROGRESS NOTES
[facility] Hospital Medicine  Progress Note    Patient Name: Shireen Felix  MRN: [id number]  Patient Class: OP- Observation   Admission Date: 8/4/2019  Length of Stay: 0 days  Attending Physician: Stephenie Solano MD  Primary Care Provider: Jossie Ac MD        Subjective:     Principal Problem:<principal problem not specified>      HPI:      73 y.o. female patient with a PMHx of seizure amongsts other co morbidities  presents to the Emergency Department for evaluation after a fall which onset last night.   She has a history of recurrent falls ,she lost her balance and fell on her left side at home last night      As a result of this fall she is sufferingleft hip pain .  This is worse with movement and  weight-bearing.      She  denies syncope. Patient  was seen at this facility by Dr. Bautista on Wednesday for evaluation  Fall.      Patient is pending placement this upcoming week in rehab.     ER evaluation was significant for wbc 13.89 , CO2 17,creatinine of 1.9  urine positive for nitrates amd hyaline cast  , mild ketones in urine     Overview/Hospital Course:  08/05   Complaints of epigastric tenderness    SOB with wheezing      08/06    CT of Lumbar spine -L4 and L5 nerve root impingement.     Old compression fracture of the T12 vertebral body   Continue Neuro checks   Neurosurgery consulted   Discharge held pending evaluation per neuro surgery    No new subjective & objective note has been filed under this hospital service since the last note was generated.      Assessment/Plan:      Neuroforaminal stenosis of lumbar spine  Continue PT eval   Neurosurgery consulted       Acute cystitis   empiric abx     cultures -NGTD      COPD exacerbation  Duonebs /Steroids      DM (diabetes mellitus) type II controlled with renal manifestation  Accu checks /SSI  Hold oral hyogylcemic      Acute renal failure with acute cortical necrosis  Due to volume depletion    resolved    Recurrent falls      Recurrent falls   Neuro checks   PT eval  Plan for Rehab placement       HTN (hypertension)  Fair control  home meds as appropriate      Focal seizures   Patient denies syncope or seizure like activity   Neuro checks with vitals     Coronary artery disease    Monitor for signs of cardiac ischemia       CHF (congestive heart failure)  2D ECHO -normal systolic , diastolic function  CHF core measures         VTE Risk Mitigation (From admission, onward)        Ordered     heparin (porcine) injection 5,000 Units  Every 8 hours      08/04/19 1812                Stephenie Solano MD  Department of Hospital Medicine   [facility]

## 2019-08-06 NOTE — SUBJECTIVE & OBJECTIVE
Past Medical History:   Diagnosis Date    Abnormal ECG 8/5/2019    Aneurysm     Anticoagulant long-term use     Arthritis     CAD in native artery 8/5/2019    COPD (chronic obstructive pulmonary disease)     Diabetes mellitus     Glaucoma     Hypertension     Seizures     Shingles 05/27/2017    Stroke        Past Surgical History:   Procedure Laterality Date    Bilateral L3/4 Transforaminal Epidural Steroid Injection Bilateral 7/23/2019    Performed by [facility] at [facility]    BRAIN SURGERY      HYSTERECTOMY         Review of patient's allergies indicates:   Allergen Reactions    Doxycycline Nausea Only     Unable to tolerate medication  Unable to tolerate medication  Unable to tolerate medication  Unable to tolerate medication  Unable to tolerate medication      Penicillins Anaphylaxis, Hives, Shortness Of Breath and Swelling    Oxycodone Other (See Comments)     sleepy  sleepy  sleepy  sleepy  sleepy      Adhesive Rash    Iodine and iodide containing products Rash     Patient denies allergy to IV dye and has had multiple CT studies and a heart cath without allergy to IV contrast or premedication.  She states allergy was to Betadine.  Patient denies allergy to IV dye and has had multiple CT studies and a heart cath without allergy to IV contrast or premedication.  She states allergy was to Betadine.      Povidone-iodine Rash    Sulfa (sulfonamide antibiotics) Itching       No current facility-administered medications on file prior to encounter.      Current Outpatient Medications on File Prior to Encounter   Medication Sig    (Magic mouthwash) 1:1:1 Benadryl 12.5mg/5ml liq, aluminum & magnesium hydroxide-simehticone (Maalox), lidocaine viscous 2% Swish and spit 15 mLs every 4 (four) hours as needed. for mouth sores    albuterol 90 mcg/actuation inhaler Inhale 2 puffs into the lungs every 4 (four) hours as needed.     albuterol-ipratropium (DUO-NEB) 2.5 mg-0.5 mg/3 mL  nebulizer solution Take 3 mLs by nebulization every 6 (six) hours as needed for Wheezing. Rescue    amLODIPine (NORVASC) 10 MG tablet Take 10 mg by mouth once daily.     clopidogrel (PLAVIX) 75 mg tablet Take 75 mg by mouth once daily.     dexlansoprazole (DEXILANT) 60 mg capsule Take 60 mg by mouth once daily.     diphenhydrAMINE (BENADRYL) 50 MG capsule Take 1 capsule (50 mg total) by mouth As instructed for Itching (take 1 tab one hour prior to test).    furosemide (LASIX) 20 MG tablet Take 10 mg by mouth 2 (two) times daily.     gemfibrozil (LOPID) 600 MG tablet Take 600 mg by mouth once daily.     hydrocodone-chlorpheniramine (TUSSIONEX) 10-8 mg/5 mL suspension Take 5 mLs by mouth every 12 (twelve) hours as needed for Cough or Congestion.    levoFLOXacin (LEVAQUIN) 750 MG tablet Take 1 tablet (750 mg total) by mouth once daily. for 14 days    metFORMIN (GLUCOPHAGE) 500 MG tablet Take 500 mg by mouth once daily.     metoprolol tartrate (LOPRESSOR) 100 MG tablet Take 100 mg by mouth 2 (two) times daily.     oxyCODONE-acetaminophen (PERCOCET) 5-325 mg per tablet Take 1 tablet by mouth every 4 to 6 hours as needed for Pain.    predniSONE (DELTASONE) 10 MG tablet 40 mg PO QD X 3 days then 30 mg PO QD x 3 days then 20 mg PO QD X 3 days then 10 mg PO QD X 3 days then 5 mg PO QD x 3 days then stop.    promethazine (PHENERGAN) 6.25 mg/5 mL syrup Take 5 mLs (6.25 mg total) by mouth every 6 (six) hours as needed (cough).    ranolazine (RANEXA) 1,000 mg Tb12 Take 1,000 mg by mouth 2 (two) times daily.     temazepam (RESTORIL) 15 mg Cap Take 15 mg by mouth every evening.     tiotropium (SPIRIVA) 18 mcg inhalation capsule Inhale 18 mcg into the lungs once daily.     tolterodine (DETROL) 2 MG Tab Take 4 mg by mouth once daily.     valsartan (DIOVAN) 320 MG tablet Take 320 mg by mouth once daily.     venlafaxine (EFFEXOR-XR) 150 MG Cp24 Take 75 mg by mouth once daily.    benzonatate (TESSALON) 100 MG  capsule Take 1 capsule (100 mg total) by mouth every 4 (four) hours as needed for Cough.    cholecalciferol, vitamin D3, (VITAMIN D3 ORAL) Take 10,000 Units by mouth. Twice weekly    DULoxetine (CYMBALTA) 30 MG capsule Take 1 capsule (30 mg total) by mouth once daily.    escitalopram oxalate (LEXAPRO) 20 MG tablet Take 20 mg by mouth once daily.    guaifenesin 100 mg/5 ml (ROBITUSSIN) 100 mg/5 mL syrup Take 5-10 mLs (100-200 mg total) by mouth every 4 (four) hours as needed for Cough.    lidocaine-prilocaine (EMLA) cream Apply topically as needed. For itching or pain to face.    naproxen (NAPROSYN) 375 MG tablet Take 1 tablet (375 mg total) by mouth 2 (two) times daily with meals.    nebulizer accessories Kit by Miscellaneous route. Use as directed    ondansetron (ZOFRAN) 4 MG tablet Take 1 tablet (4 mg total) by mouth every 6 (six) hours.    ondansetron (ZOFRAN-ODT) 4 MG TbDL Take 1 tablet (4 mg total) by mouth every 8 (eight) hours as needed (nausea/vomiting).    ranitidine (ZANTAC) 75 MG tablet Take 150 mg by mouth 2 (two) times daily.     turmeric root extract 500 mg Cap Take 500 mg by mouth 2 (two) times daily.     Family History     None        Tobacco Use    Smoking status: Former Smoker     Packs/day: 1.00     Years: 10.00     Pack years: 10.00     Types: Cigarettes     Last attempt to quit: 4/11/2004     Years since quitting: 15.3    Smokeless tobacco: Never Used   Substance and Sexual Activity    Alcohol use: No    Drug use: Never    Sexual activity: Not on file     Review of Systems   Constitutional: Positive for activity change.   HENT: Negative.    Eyes: Negative.    Respiratory: Negative.    Cardiovascular: Negative.    Gastrointestinal: Negative.    Endocrine: Negative.    Genitourinary: Negative.    Musculoskeletal: Positive for arthralgias, back pain, gait problem and myalgias.   Skin: Negative.    Allergic/Immunologic: Negative.    Hematological: Negative.    Psychiatric/Behavioral:  Negative.      Objective:     Vital Signs (Most Recent):  Temp: 98 °F (36.7 °C) (08/06/19 0738)  Pulse: 71 (08/06/19 1306)  Resp: 16 (08/06/19 1306)  BP: (!) 145/65 (08/06/19 0738)  SpO2: 99 % (08/06/19 1306) Vital Signs (24h Range):  Temp:  [97.9 °F (36.6 °C)-98.4 °F (36.9 °C)] 98 °F (36.7 °C)  Pulse:  [60-86] 71  Resp:  [12-20] 16  SpO2:  [95 %-100 %] 99 %  BP: (112-145)/(56-65) 145/65     Weight: 64.4 kg (141 lb 15.6 oz)  Body mass index is 24.37 kg/m².    Physical Exam   Constitutional: She is oriented to person, place, and time. She appears well-developed and well-nourished.   HENT:   Head: Normocephalic and atraumatic.   Eyes: Pupils are equal, round, and reactive to light. Conjunctivae and EOM are normal.   Neck: Normal range of motion. Neck supple.   Cardiovascular: Normal rate and regular rhythm.   Pulmonary/Chest: Effort normal. She has wheezes.   Abdominal: Soft. Bowel sounds are normal.   Musculoskeletal: Normal range of motion. She exhibits edema.   Neurological: She is alert and oriented to person, place, and time.   Skin: Skin is warm and dry.   Multiple ecchymoses   Psychiatric: She has a normal mood and affect. Her behavior is normal. Judgment and thought content normal.         CRANIAL NERVES     CN III, IV, VI   Pupils are equal, round, and reactive to light.  Extraocular motions are normal.        Significant Labs:   BMP:   Recent Labs   Lab 08/05/19  0957   GLU 59*      K 3.9   *   CO2 17*   BUN 31*   CREATININE 1.3   CALCIUM 8.7     CBC:   Recent Labs   Lab 08/04/19  1615 08/06/19  0715   WBC 13.89* 8.22   HGB 11.4* 11.2*   HCT 35.0* 33.9*    228       Significant Imaging: I have reviewed all pertinent imaging results/findings within the past 24 hours.

## 2019-08-06 NOTE — PLAN OF CARE
Problem: Physical Therapy Goal  Goal: Physical Therapy Goal  PT WILL BE SEEN FOR P.T. FOR A MIN OF 5 OUT OF 7 DAYS A WEEK  LT19  1. PT WILL COMPLETE BED MOBILITY WITH MIN A  2. PT WILL T/F TO CHAIR WITH MOD A  3. PT WILL COMPLETE B LE TE X 20 REP     Outcome: Ongoing (interventions implemented as appropriate)  PT T/F TO CHAIR WITH MAX A X 2 SQUAT PIVOT T/F

## 2019-08-07 ENCOUNTER — TELEPHONE (OUTPATIENT)
Dept: PAIN MEDICINE | Facility: CLINIC | Age: 74
End: 2019-08-07

## 2019-08-07 VITALS
TEMPERATURE: 98 F | WEIGHT: 142 LBS | HEART RATE: 77 BPM | BODY MASS INDEX: 24.24 KG/M2 | RESPIRATION RATE: 18 BRPM | DIASTOLIC BLOOD PRESSURE: 73 MMHG | SYSTOLIC BLOOD PRESSURE: 150 MMHG | OXYGEN SATURATION: 98 % | HEIGHT: 64 IN

## 2019-08-07 PROBLEM — N17.1 ACUTE RENAL FAILURE WITH ACUTE CORTICAL NECROSIS: Status: RESOLVED | Noted: 2019-08-04 | Resolved: 2019-08-07

## 2019-08-07 PROBLEM — R79.89 ELEVATED TROPONIN: Status: RESOLVED | Noted: 2019-08-05 | Resolved: 2019-08-07

## 2019-08-07 PROBLEM — J44.1 COPD EXACERBATION: Status: RESOLVED | Noted: 2019-08-04 | Resolved: 2019-08-07

## 2019-08-07 PROBLEM — N30.00 ACUTE CYSTITIS: Status: RESOLVED | Noted: 2019-08-04 | Resolved: 2019-08-07

## 2019-08-07 PROBLEM — R07.89 ATYPICAL CHEST PAIN: Status: RESOLVED | Noted: 2019-08-05 | Resolved: 2019-08-07

## 2019-08-07 PROBLEM — R94.31 ABNORMAL ECG: Status: RESOLVED | Noted: 2019-08-05 | Resolved: 2019-08-07

## 2019-08-07 LAB
POCT GLUCOSE: 113 MG/DL (ref 70–110)
POCT GLUCOSE: 130 MG/DL (ref 70–110)

## 2019-08-07 PROCEDURE — 94760 N-INVAS EAR/PLS OXIMETRY 1: CPT

## 2019-08-07 PROCEDURE — 96376 TX/PRO/DX INJ SAME DRUG ADON: CPT | Performed by: EMERGENCY MEDICINE

## 2019-08-07 PROCEDURE — S0028 INJECTION, FAMOTIDINE, 20 MG: HCPCS | Performed by: INTERNAL MEDICINE

## 2019-08-07 PROCEDURE — 25000003 PHARM REV CODE 250: Performed by: INTERNAL MEDICINE

## 2019-08-07 PROCEDURE — 25000242 PHARM REV CODE 250 ALT 637 W/ HCPCS: Performed by: EMERGENCY MEDICINE

## 2019-08-07 PROCEDURE — 25000242 PHARM REV CODE 250 ALT 637 W/ HCPCS: Performed by: INTERNAL MEDICINE

## 2019-08-07 PROCEDURE — 96372 THER/PROPH/DIAG INJ SC/IM: CPT

## 2019-08-07 PROCEDURE — 96372 THER/PROPH/DIAG INJ SC/IM: CPT | Performed by: EMERGENCY MEDICINE

## 2019-08-07 PROCEDURE — 94640 AIRWAY INHALATION TREATMENT: CPT

## 2019-08-07 PROCEDURE — 99213 OFFICE O/P EST LOW 20 MIN: CPT | Mod: ,,, | Performed by: NEUROLOGICAL SURGERY

## 2019-08-07 PROCEDURE — G0378 HOSPITAL OBSERVATION PER HR: HCPCS

## 2019-08-07 PROCEDURE — 63600175 PHARM REV CODE 636 W HCPCS: Performed by: INTERNAL MEDICINE

## 2019-08-07 PROCEDURE — 99213 PR OFFICE/OUTPT VISIT, EST, LEVL III, 20-29 MIN: ICD-10-PCS | Mod: ,,, | Performed by: NEUROLOGICAL SURGERY

## 2019-08-07 RX ORDER — ESCITALOPRAM OXALATE 20 MG/1
20 TABLET ORAL DAILY
Qty: 30 TABLET | Refills: 0 | Status: SHIPPED | OUTPATIENT
Start: 2019-08-07 | End: 2021-11-11 | Stop reason: SDUPTHER

## 2019-08-07 RX ORDER — ALBUTEROL SULFATE 90 UG/1
2 AEROSOL, METERED RESPIRATORY (INHALATION) EVERY 4 HOURS PRN
Qty: 18 G | Refills: 0 | Status: SHIPPED | OUTPATIENT
Start: 2019-08-07 | End: 2020-01-23 | Stop reason: SDUPTHER

## 2019-08-07 RX ORDER — BENZONATATE 100 MG/1
100 CAPSULE ORAL EVERY 4 HOURS PRN
Qty: 120 CAPSULE | Refills: 3 | Status: SHIPPED | OUTPATIENT
Start: 2019-08-07 | End: 2019-09-06

## 2019-08-07 RX ORDER — CLOPIDOGREL BISULFATE 75 MG/1
75 TABLET ORAL DAILY
Qty: 30 TABLET | Refills: 0 | Status: SHIPPED | OUTPATIENT
Start: 2019-08-07 | End: 2019-11-13 | Stop reason: SDUPTHER

## 2019-08-07 RX ORDER — IPRATROPIUM BROMIDE AND ALBUTEROL SULFATE 2.5; .5 MG/3ML; MG/3ML
3 SOLUTION RESPIRATORY (INHALATION) EVERY 6 HOURS PRN
Qty: 1 BOX | Refills: 1 | Status: SHIPPED | OUTPATIENT
Start: 2019-08-07 | End: 2020-09-23 | Stop reason: SDUPTHER

## 2019-08-07 RX ORDER — METOPROLOL TARTRATE 100 MG/1
100 TABLET ORAL 2 TIMES DAILY
Qty: 60 TABLET | Refills: 0 | Status: SHIPPED | OUTPATIENT
Start: 2019-08-07 | End: 2019-11-14 | Stop reason: SDUPTHER

## 2019-08-07 RX ORDER — GEMFIBROZIL 600 MG/1
600 TABLET, FILM COATED ORAL DAILY
Qty: 30 TABLET | Refills: 0 | Status: SHIPPED | OUTPATIENT
Start: 2019-08-07 | End: 2022-01-16

## 2019-08-07 RX ORDER — METFORMIN HYDROCHLORIDE 500 MG/1
500 TABLET ORAL 2 TIMES DAILY
Qty: 60 TABLET | Refills: 0 | Status: SHIPPED | OUTPATIENT
Start: 2019-08-07 | End: 2020-08-18

## 2019-08-07 RX ORDER — METOPROLOL TARTRATE 100 MG/1
100 TABLET ORAL 2 TIMES DAILY
Qty: 60 TABLET | Refills: 0 | Status: SHIPPED | OUTPATIENT
Start: 2019-08-07 | End: 2019-08-07 | Stop reason: SDUPTHER

## 2019-08-07 RX ORDER — OXYCODONE AND ACETAMINOPHEN 5; 325 MG/1; MG/1
1 TABLET ORAL EVERY 6 HOURS PRN
Qty: 12 TABLET | Refills: 0 | Status: SHIPPED | OUTPATIENT
Start: 2019-08-07 | End: 2019-11-08 | Stop reason: SDUPTHER

## 2019-08-07 RX ORDER — TIOTROPIUM BROMIDE 18 UG/1
18 CAPSULE ORAL; RESPIRATORY (INHALATION) DAILY
Qty: 30 CAPSULE | Refills: 0 | Status: SHIPPED | OUTPATIENT
Start: 2019-08-07 | End: 2020-01-23

## 2019-08-07 RX ORDER — RANOLAZINE 1000 MG/1
1000 TABLET, EXTENDED RELEASE ORAL 2 TIMES DAILY
Qty: 60 TABLET | Refills: 0 | Status: SHIPPED | OUTPATIENT
Start: 2019-08-07 | End: 2019-11-14 | Stop reason: SDUPTHER

## 2019-08-07 RX ORDER — DULOXETIN HYDROCHLORIDE 30 MG/1
30 CAPSULE, DELAYED RELEASE ORAL DAILY
Qty: 30 CAPSULE | Refills: 3 | Status: SHIPPED | OUTPATIENT
Start: 2019-08-07 | End: 2020-04-28 | Stop reason: SDUPTHER

## 2019-08-07 RX ORDER — AMLODIPINE BESYLATE 10 MG/1
10 TABLET ORAL DAILY
Qty: 30 TABLET | Refills: 0 | Status: SHIPPED | OUTPATIENT
Start: 2019-08-07 | End: 2019-11-14 | Stop reason: SDUPTHER

## 2019-08-07 RX ORDER — HYDROCODONE POLISTIREX AND CHLORPHENIRAMINE POLISTIREX 10; 8 MG/5ML; MG/5ML
5 SUSPENSION, EXTENDED RELEASE ORAL EVERY 12 HOURS PRN
Qty: 100 ML | Refills: 0 | Status: SHIPPED | OUTPATIENT
Start: 2019-08-07 | End: 2019-12-03 | Stop reason: ALTCHOICE

## 2019-08-07 RX ORDER — RANOLAZINE 1000 MG/1
1000 TABLET, EXTENDED RELEASE ORAL 2 TIMES DAILY
Qty: 60 TABLET | Refills: 0 | Status: SHIPPED | OUTPATIENT
Start: 2019-08-07 | End: 2019-08-07 | Stop reason: SDUPTHER

## 2019-08-07 RX ORDER — PREDNISONE 10 MG/1
TABLET ORAL
Qty: 60 TABLET | Refills: 0 | Status: SHIPPED | OUTPATIENT
Start: 2019-08-07 | End: 2019-09-05

## 2019-08-07 RX ORDER — DULOXETIN HYDROCHLORIDE 30 MG/1
30 CAPSULE, DELAYED RELEASE ORAL DAILY
Qty: 30 CAPSULE | Refills: 3 | Status: SHIPPED | OUTPATIENT
Start: 2019-08-07 | End: 2019-08-07

## 2019-08-07 RX ORDER — IPRATROPIUM BROMIDE AND ALBUTEROL SULFATE 2.5; .5 MG/3ML; MG/3ML
3 SOLUTION RESPIRATORY (INHALATION) EVERY 6 HOURS PRN
Qty: 1 BOX | Refills: 1 | Status: SHIPPED | OUTPATIENT
Start: 2019-08-07 | End: 2019-08-07 | Stop reason: SDUPTHER

## 2019-08-07 RX ORDER — TOLTERODINE TARTRATE 2 MG/1
4 TABLET, EXTENDED RELEASE ORAL DAILY
Qty: 30 TABLET | Refills: 0 | Status: SHIPPED | OUTPATIENT
Start: 2019-08-07 | End: 2019-08-07 | Stop reason: SDUPTHER

## 2019-08-07 RX ORDER — METFORMIN HYDROCHLORIDE 500 MG/1
500 TABLET ORAL DAILY
Qty: 60 TABLET | Refills: 0 | Status: SHIPPED | OUTPATIENT
Start: 2019-08-07 | End: 2019-08-07 | Stop reason: SDUPTHER

## 2019-08-07 RX ORDER — ONDANSETRON 4 MG/1
4 TABLET, FILM COATED ORAL EVERY 6 HOURS
Qty: 30 TABLET | Refills: 0 | Status: SHIPPED | OUTPATIENT
Start: 2019-08-07 | End: 2019-12-03 | Stop reason: ALTCHOICE

## 2019-08-07 RX ORDER — TIOTROPIUM BROMIDE 18 UG/1
18 CAPSULE ORAL; RESPIRATORY (INHALATION) DAILY
Qty: 1 BOX | Refills: 0 | Status: SHIPPED | OUTPATIENT
Start: 2019-08-07 | End: 2019-08-07 | Stop reason: SDUPTHER

## 2019-08-07 RX ORDER — DEXLANSOPRAZOLE 60 MG/1
60 CAPSULE, DELAYED RELEASE ORAL DAILY
Qty: 30 CAPSULE | Refills: 0 | Status: SHIPPED | OUTPATIENT
Start: 2019-08-07 | End: 2019-08-07 | Stop reason: SDUPTHER

## 2019-08-07 RX ORDER — ESCITALOPRAM OXALATE 20 MG/1
20 TABLET ORAL DAILY
Qty: 30 TABLET | Refills: 0 | Status: SHIPPED | OUTPATIENT
Start: 2019-08-07 | End: 2019-08-07 | Stop reason: SDUPTHER

## 2019-08-07 RX ORDER — GUAIFENESIN 100 MG/5ML
100-200 SOLUTION ORAL EVERY 4 HOURS PRN
Qty: 60 ML | Refills: 0 | Status: SHIPPED | OUTPATIENT
Start: 2019-08-07 | End: 2019-08-07 | Stop reason: HOSPADM

## 2019-08-07 RX ORDER — VALSARTAN 320 MG/1
320 TABLET ORAL DAILY
Qty: 30 TABLET | Refills: 0 | Status: SHIPPED | OUTPATIENT
Start: 2019-08-07 | End: 2019-11-14 | Stop reason: SDUPTHER

## 2019-08-07 RX ORDER — TEMAZEPAM 15 MG/1
15 CAPSULE ORAL NIGHTLY
Qty: 30 CAPSULE | Refills: 0 | Status: SHIPPED | OUTPATIENT
Start: 2019-08-07 | End: 2019-12-30

## 2019-08-07 RX ORDER — VIT C/E/ZN/COPPR/LUTEIN/ZEAXAN 250MG-90MG
10000 CAPSULE ORAL DAILY
Refills: 0 | COMMUNITY
Start: 2019-08-07 | End: 2020-01-02 | Stop reason: SDUPTHER

## 2019-08-07 RX ORDER — PROMETHAZINE HYDROCHLORIDE 6.25 MG/5ML
6.25 SYRUP ORAL EVERY 6 HOURS PRN
Qty: 118 ML | Refills: 0 | Status: SHIPPED | OUTPATIENT
Start: 2019-08-07 | End: 2019-12-03

## 2019-08-07 RX ORDER — DEXLANSOPRAZOLE 60 MG/1
60 CAPSULE, DELAYED RELEASE ORAL DAILY
Qty: 30 CAPSULE | Refills: 0 | Status: SHIPPED | OUTPATIENT
Start: 2019-08-07 | End: 2020-04-28 | Stop reason: SDUPTHER

## 2019-08-07 RX ORDER — FAMOTIDINE 20 MG/50ML
20 INJECTION, SOLUTION INTRAVENOUS DAILY
Status: DISCONTINUED | OUTPATIENT
Start: 2019-08-07 | End: 2019-08-07 | Stop reason: HOSPADM

## 2019-08-07 RX ORDER — TOLTERODINE TARTRATE 2 MG/1
2 TABLET, EXTENDED RELEASE ORAL DAILY
Qty: 30 TABLET | Refills: 0 | Status: SHIPPED | OUTPATIENT
Start: 2019-08-07 | End: 2019-12-03 | Stop reason: SDUPTHER

## 2019-08-07 RX ORDER — ALBUTEROL SULFATE 90 UG/1
2 AEROSOL, METERED RESPIRATORY (INHALATION) EVERY 4 HOURS PRN
Qty: 1 INHALER | Refills: 0 | Status: SHIPPED | OUTPATIENT
Start: 2019-08-07 | End: 2019-08-07 | Stop reason: SDUPTHER

## 2019-08-07 RX ORDER — TEMAZEPAM 15 MG/1
15 CAPSULE ORAL NIGHTLY
Qty: 30 CAPSULE | Refills: 0 | Status: SHIPPED | OUTPATIENT
Start: 2019-08-07 | End: 2019-08-07 | Stop reason: SDUPTHER

## 2019-08-07 RX ORDER — VENLAFAXINE HYDROCHLORIDE 37.5 MG/1
37.5 CAPSULE, EXTENDED RELEASE ORAL DAILY
Qty: 30 CAPSULE | Refills: 0 | Status: SHIPPED | OUTPATIENT
Start: 2019-08-07 | End: 2019-12-03 | Stop reason: SDUPTHER

## 2019-08-07 RX ORDER — LIDOCAINE AND PRILOCAINE 25; 25 MG/G; MG/G
CREAM TOPICAL DAILY
Qty: 1 G | Refills: 0 | Status: SHIPPED | OUTPATIENT
Start: 2019-08-07 | End: 2019-08-07 | Stop reason: HOSPADM

## 2019-08-07 RX ORDER — VENLAFAXINE HYDROCHLORIDE 37.5 MG/1
37.5 CAPSULE, EXTENDED RELEASE ORAL DAILY
Qty: 30 CAPSULE | Refills: 0 | Status: SHIPPED | OUTPATIENT
Start: 2019-08-07 | End: 2019-08-07 | Stop reason: SDUPTHER

## 2019-08-07 RX ORDER — FAMOTIDINE 20 MG/1
20 TABLET, FILM COATED ORAL DAILY
Status: DISCONTINUED | OUTPATIENT
Start: 2019-08-07 | End: 2019-08-07

## 2019-08-07 RX ORDER — GEMFIBROZIL 600 MG/1
600 TABLET, FILM COATED ORAL DAILY
Qty: 30 TABLET | Refills: 0 | Status: SHIPPED | OUTPATIENT
Start: 2019-08-07 | End: 2019-08-07 | Stop reason: SDUPTHER

## 2019-08-07 RX ORDER — ONDANSETRON 4 MG/1
4 TABLET, ORALLY DISINTEGRATING ORAL EVERY 8 HOURS PRN
Qty: 20 TABLET | Refills: 0 | Status: SHIPPED | OUTPATIENT
Start: 2019-08-07 | End: 2019-08-07 | Stop reason: HOSPADM

## 2019-08-07 RX ORDER — VIT C/E/ZN/COPPR/LUTEIN/ZEAXAN 250MG-90MG
10000 CAPSULE ORAL DAILY
Refills: 0 | COMMUNITY
Start: 2019-08-07 | End: 2019-08-07 | Stop reason: SDUPTHER

## 2019-08-07 RX ADMIN — OXYCODONE AND ACETAMINOPHEN 1 TABLET: 5; 325 TABLET ORAL at 04:08

## 2019-08-07 RX ADMIN — IPRATROPIUM BROMIDE 0.5 MG: 0.5 SOLUTION RESPIRATORY (INHALATION) at 01:08

## 2019-08-07 RX ADMIN — ESCITALOPRAM OXALATE 20 MG: 10 TABLET, FILM COATED ORAL at 09:08

## 2019-08-07 RX ADMIN — ONDANSETRON 4 MG: 4 TABLET, ORALLY DISINTEGRATING ORAL at 12:08

## 2019-08-07 RX ADMIN — OXYCODONE AND ACETAMINOPHEN 1 TABLET: 5; 325 TABLET ORAL at 02:08

## 2019-08-07 RX ADMIN — LEVOFLOXACIN 750 MG: 750 TABLET, FILM COATED ORAL at 09:08

## 2019-08-07 RX ADMIN — BUDESONIDE 0.5 MG: 0.5 SUSPENSION RESPIRATORY (INHALATION) at 07:08

## 2019-08-07 RX ADMIN — VENLAFAXINE HYDROCHLORIDE 75 MG: 75 CAPSULE, EXTENDED RELEASE ORAL at 09:08

## 2019-08-07 RX ADMIN — HEPARIN SODIUM 5000 UNITS: 5000 INJECTION, SOLUTION INTRAVENOUS; SUBCUTANEOUS at 05:08

## 2019-08-07 RX ADMIN — OXYBUTYNIN CHLORIDE 10 MG: 5 TABLET, EXTENDED RELEASE ORAL at 09:08

## 2019-08-07 RX ADMIN — DULOXETINE 30 MG: 30 CAPSULE, DELAYED RELEASE ORAL at 09:08

## 2019-08-07 RX ADMIN — CLOPIDOGREL BISULFATE 75 MG: 75 TABLET ORAL at 09:08

## 2019-08-07 RX ADMIN — AMLODIPINE BESYLATE 10 MG: 10 TABLET ORAL at 09:08

## 2019-08-07 RX ADMIN — GEMFIBROZIL 600 MG: 600 TABLET ORAL at 09:08

## 2019-08-07 RX ADMIN — RANOLAZINE 1000 MG: 500 TABLET, FILM COATED, EXTENDED RELEASE ORAL at 09:08

## 2019-08-07 RX ADMIN — METHYLPREDNISOLONE SODIUM SUCCINATE 40 MG: 40 INJECTION, POWDER, FOR SOLUTION INTRAMUSCULAR; INTRAVENOUS at 05:08

## 2019-08-07 RX ADMIN — FAMOTIDINE 20 MG: 20 INJECTION, SOLUTION INTRAVENOUS at 09:08

## 2019-08-07 RX ADMIN — BENZONATATE 100 MG: 100 CAPSULE ORAL at 04:08

## 2019-08-07 RX ADMIN — ARFORMOTEROL TARTRATE 15 MCG: 15 SOLUTION RESPIRATORY (INHALATION) at 07:08

## 2019-08-07 RX ADMIN — ALUMINUM HYDROXIDE, MAGNESIUM HYDROXIDE, AND SIMETHICONE: 200; 200; 20 SUSPENSION ORAL at 09:08

## 2019-08-07 RX ADMIN — IPRATROPIUM BROMIDE 0.5 MG: 0.5 SOLUTION RESPIRATORY (INHALATION) at 12:08

## 2019-08-07 RX ADMIN — METOPROLOL TARTRATE 100 MG: 50 TABLET ORAL at 09:08

## 2019-08-07 RX ADMIN — IPRATROPIUM BROMIDE 0.5 MG: 0.5 SOLUTION RESPIRATORY (INHALATION) at 07:08

## 2019-08-07 RX ADMIN — PROMETHAZINE HYDROCHLORIDE AND CODEINE PHOSPHATE 5 ML: 10; 6.25 SOLUTION ORAL at 12:08

## 2019-08-07 RX ADMIN — ONDANSETRON 4 MG: 4 TABLET, ORALLY DISINTEGRATING ORAL at 05:08

## 2019-08-07 RX ADMIN — METHYLPREDNISOLONE SODIUM SUCCINATE 40 MG: 40 INJECTION, POWDER, FOR SOLUTION INTRAMUSCULAR; INTRAVENOUS at 12:08

## 2019-08-07 NOTE — PLAN OF CARE
Patient has been accepted to [facility]. Discharge orders noted. Faxed Imaging, dc, avs, and dc summary to [facility]. Contacted Alyssa Admissions at [facility] they are unable to provide 2 of the patient's medicine( detrol and ranexa) They spoke with the patient's family and the family will provide these 2 medicines. Called to room, family informed me that they will need prescriptions so that they can provide the 2 medications. The family requested that we fax the prescriptions to the [facility] and have them deliver to the hospital. Spoke with Dr Fabian at [facility]. Patient and family verbalized understanding.    Discharge packet given to Diana walters nurse.     Disposition: [facility]    # for report: [phone]( chg nurse)    Transportation:  @ 2pm         08/07/19 1234   Post-Acute Status   Post-Acute Authorization Placement   Post-Acute Placement Status Set-up Complete

## 2019-08-07 NOTE — PLAN OF CARE
Problem: Adult Inpatient Plan of Care  Goal: Plan of Care Review  Outcome: Outcome(s) achieved Date Met: 08/07/19  Discharge paperwork given to transport and report called to  rehab receiving nurse. Patient transferred via wheelchair off unit. Transferred to  Rehab via rehab facility transportation.  IV removed. Patient received PRN pain meds to control pain associated with being transferred to facility.  Patient has no questions or concerns.

## 2019-08-07 NOTE — CONSULTS
[facility] -   Neurosurgery  Consult Note    Inpatient consult to Neurosurgery  Consult performed by: Everardo Olson MD  Consult ordered by: Stephenie Solano MD  Assessment/Recommendations: Patient at this point has chronic degenerative changes to the lumbar spine with progressive worsening of the symptoms.  Given her new fall with worsened shoulder pain as well as back pain.  Patient is slated to go to inpatient therapy to get her lower back pain and shoulder pain back to her baseline.  One she is at baseline I would be happy to see her back to consider a posterior decompression.  She has no focal weakness at this point.  Would recommend therapy to get her back to her baseline and then consider surgical decompression when she is medically stable.  Prior to surgery she would need medical clearance she does have a history of other medical issues taking blood thinners which she would have to be off of for 5 days prior to surgery and approximately 10-15 days following.  I spoke to both the daughter as well as the patient who both understand.  We will follow-up with the patient and 2-3 weeks following discharge to see how she is doing and see if any other treatments are warranted.        Subjective:     Chief Complaint/Reason for Admission:  History of falls with progressive lower back pain    History of Present Illness:  Patient is a 73-year-old known to the neurosurgical service.  She has a history of lower back pain going into the lower extremities.  She was initially seen in the clinic and prescribed a CT myelogram of the lumbar spine secondary to lumbar pain.  She is unable to have an MRI secondary to aneurysmal clips done at an outside hospital for an aneurysm in the brain.  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  Patient initially presented to the hospital status post fall from standing with worsened lower back pain as well as right shoulder pain.  Patient notes that she has had  decreased mobility overall over the past several weeks common 79 into a fall from standing which she sustained on Sunday.  She has been working with physical therapy.  Currently she states that her pain is 5/10.  The symptoms radiate into the bilateral lower extremities with associated numbness and tingling.  With therapy she has a ambulated several times and they have been working with her however she continues to have pain.  Denies any bowel or bladder symptoms at this time    PTA Medications   Medication Sig    [DISCONTINUED] diphenhydrAMINE (BENADRYL) 50 MG capsule Take 1 capsule (50 mg total) by mouth As instructed for Itching (take 1 tab one hour prior to test).    [DISCONTINUED] furosemide (LASIX) 20 MG tablet Take 10 mg by mouth 2 (two) times daily.     [DISCONTINUED] levoFLOXacin (LEVAQUIN) 750 MG tablet Take 1 tablet (750 mg total) by mouth once daily. for 14 days    [DISCONTINUED] cholecalciferol, vitamin D3, (VITAMIN D3 ORAL) Take 10,000 Units by mouth. Twice weekly    [DISCONTINUED] DULoxetine (CYMBALTA) 30 MG capsule Take 1 capsule (30 mg total) by mouth once daily.    [DISCONTINUED] guaifenesin 100 mg/5 ml (ROBITUSSIN) 100 mg/5 mL syrup Take 5-10 mLs (100-200 mg total) by mouth every 4 (four) hours as needed for Cough.    [DISCONTINUED] naproxen (NAPROSYN) 375 MG tablet Take 1 tablet (375 mg total) by mouth 2 (two) times daily with meals.    [DISCONTINUED] nebulizer accessories Kit by Miscellaneous route. Use as directed    [DISCONTINUED] turmeric root extract 500 mg Cap Take 500 mg by mouth 2 (two) times daily.       Review of patient's allergies indicates:   Allergen Reactions    Doxycycline Nausea Only     Unable to tolerate medication  Unable to tolerate medication  Unable to tolerate medication  Unable to tolerate medication  Unable to tolerate medication      Penicillins Anaphylaxis, Hives, Shortness Of Breath and Swelling    Oxycodone Other (See Comments)      sleepy  sleepy  sleepy  sleepy  sleepy      Adhesive Rash    Iodine and iodide containing products Rash     Patient denies allergy to IV dye and has had multiple CT studies and a heart cath without allergy to IV contrast or premedication.  She states allergy was to Betadine.  Patient denies allergy to IV dye and has had multiple CT studies and a heart cath without allergy to IV contrast or premedication.  She states allergy was to Betadine.      Povidone-iodine Rash    Sulfa (sulfonamide antibiotics) Itching       Past Medical History:   Diagnosis Date    Abnormal ECG 8/5/2019    Aneurysm     Anticoagulant long-term use     Arthritis     CAD in native artery 8/5/2019    COPD (chronic obstructive pulmonary disease)     Diabetes mellitus     Glaucoma     Hypertension     Seizures     Shingles 05/27/2017    Stroke      Past Surgical History:   Procedure Laterality Date    Bilateral L3/4 Transforaminal Epidural Steroid Injection Bilateral 7/23/2019    Performed by Desean [facility]as MD at [facility]    BRAIN SURGERY      HYSTERECTOMY       Family History     None        Tobacco Use    Smoking status: Former Smoker     Packs/day: 1.00     Years: 10.00     Pack years: 10.00     Types: Cigarettes     Last attempt to quit: 4/11/2004     Years since quitting: 15.3    Smokeless tobacco: Never Used   Substance and Sexual Activity    Alcohol use: No    Drug use: Never    Sexual activity: Not on file     Review of Systems  Objective:     Weight: 64.4 kg (141 lb 15.6 oz)  Body mass index is 24.37 kg/m².  Vital Signs (Most Recent):  Temp: 98.4 °F (36.9 °C) (08/07/19 0437)  Pulse: 77 (08/07/19 1308)  Resp: 18 (08/07/19 1308)  BP: (!) 150/73 (08/07/19 0737)  SpO2: 98 % (08/07/19 1308) Vital Signs (24h Range):  Temp:  [97.6 °F (36.4 °C)-98.4 °F (36.9 °C)] 98.4 °F (36.9 °C)  Pulse:  [72-79] 77  Resp:  [16-20] 18  SpO2:  [96 %-100 %] 98 %  BP: (116-151)/(57-73) 150/73     Date 08/07/19 0700 - 08/08/19 0659    Shift 5624-2995 8654-9892 5785-9105 24 Hour Total   INTAKE   P.O. 180   180   Shift Total(mL/kg) 180(2.8)   180(2.8)   OUTPUT   Shift Total(mL/kg)       Weight (kg) 64.4 64.4 64.4 64.4                     Nursing note and vitals reviewed  Gen:Oriented to person, place, and time.             Appears stated age   Skin: no rashes or lesions identified   Head:Normocephalic and atraumatic.  Nose: Nose normal.    Eyes: EOM are normal. Pupils are equal, round, and reactive to light.   Neck: Neck supple. No masses or lesions palpated  Cardiovascular: Intact distal pulses.    Abdominal: Soft.   Neurological: Alert and oriented to person, place, and time.  No cranial nerve deficit.  Coordination normal. Normal muscle tone  Psychiatric: Normal mood and affect. Behavior is normal.      Back:        Spasms noted bilaterally Paraspinal muscle spasms    Did not assess Pain with flexion and extention    Unable to assess the patient in bed Range of motion    Neg  Straight leg raise     Motor   Right Right Left Left  Level Group    4+  4+ L2 Hip flexor (Psoas)       L3 Leg extension (Quads)       L4 Dorsiflexion & foot inversion (Tibialis Anterior)       L5 Great toe extension ( EHL)       S1 Foot eversion (Gastroc, PL & PB)   Patient is roughly 4+ out of 5 in all extremities she was seen in bed she had a pain limited exam  No signs of focal weakness noted    Sensation  NL Decreased (R/L/BL) Level Sensation    X  L2 Anterio-medial thigh   X  L3 Medial thigh around knee   X  L4 Medial foot   X  L5 Dorsum foot   X  S1 Lateral foot     Reflex  2+  Patellar tendon (L4)   2+  Achilles tendon (S1)       Significant Labs:  No results for input(s): GLU, NA, K, CL, CO2, BUN, CREATININE, CALCIUM, MG in the last 48 hours.  Recent Labs   Lab 08/06/19  0715   WBC 8.22   HGB 11.2*   HCT 33.9*        No results for input(s): LABPT, INR, APTT in the last 48 hours.  Microbiology Results (last 7 days)     ** No results found for the last 168  hours. **       Patient CT scan an old CT myelogram is reviewed she has severe stenosis most notably at L3-4 were she has almost a complete block due to degenerative disc disease.  She has an old compression fracture T12 which is unchanged from the new CT scan.  She has degenerative changes at L1-2 L2-3 L3-4 and L4-5.    All pertinent labs from the last 24 hours have been reviewed.  Significant Diagnostics:  I have reviewed all pertinent imaging results/findings within the past 24 hours.    Assessment/Plan:     Active Diagnoses:    Diagnosis Date Noted POA    PRINCIPAL PROBLEM:  Recurrent falls [R29.6] 08/04/2019 Not Applicable    Neuroforaminal stenosis of lumbar spine [M99.83] 08/06/2019 Yes    CAD in native artery [I25.10] 08/05/2019 Yes    DM (diabetes mellitus) type II controlled with renal manifestation [E11.29] 08/04/2019 Yes    CHF (congestive heart failure) [I50.9] 06/06/2019 Yes    Focal seizures [R56.9] 09/12/2018 Yes    Coronary artery disease [I25.10] 08/01/2018 Yes    HTN (hypertension) [I10] 01/12/2015 Yes      Problems Resolved During this Admission:    Diagnosis Date Noted Date Resolved POA    Atypical chest pain [R07.89] 08/05/2019 08/07/2019 Yes    Elevated troponin [R74.8] 08/05/2019 08/07/2019 Yes    Abnormal ECG [R94.31] 08/05/2019 08/07/2019 Yes    Acute renal failure with acute cortical necrosis [N17.1] 08/04/2019 08/07/2019 Yes    DM (diabetes mellitus) type 2, uncontrolled, with ketoacidosis [E11.10] 08/04/2019 08/04/2019 Yes    COPD exacerbation [J44.1] 08/04/2019 08/07/2019 Yes    Acute cystitis [N30.00] 08/04/2019 08/07/2019 Yes       Thank you for your consult. I will follow-up with patient. Please contact us if you have any additional questions.    Everardo Olson MD  Neurosurgery  [facility] -

## 2019-08-07 NOTE — PLAN OF CARE
08/07/19 1643   Final Note   Assessment Type Final Discharge Note   Anticipated Discharge Disposition Rehab   Right Care Referral Info   Post Acute Recommendation IRF   Facility Name [facility]

## 2019-08-07 NOTE — PLAN OF CARE
Problem: Adult Inpatient Plan of Care  Goal: Plan of Care Review  Outcome: Ongoing (interventions implemented as appropriate)  POC reviewed, including indications and possible side effects of administered medications. Patient verbalized understanding and teach back. No adverse reactions noted. Patient c/o LLE/R shoulder pain and coughing. Administered medications per order. Neuro checks performed. Monitoring CBG's. Fall precautions maintained. VSS. No s/s of acute distress noted. Patient remains free of falls and injuries during shift. Will continue to monitor.    Chart check complete.

## 2019-08-07 NOTE — TELEPHONE ENCOUNTER
----- Message from Mary Martin sent at 8/7/2019  8:07 AM CDT -----  Contact: Rm Ojeda/brother [phone]  States that he is calling to speak to nurse to inform that pt is in the hospital. States that he also has more to discuss. Please call back at [phone]//thank you acc

## 2019-08-07 NOTE — NURSING
Patient coughing up bright, red blood. No clots present. Receiving breathing treatments and cough suppressants for cough, but has been non productive until now. Denies SOB and dyspnea. Informed Brody Meehan NP. No new orders noted. Will continue to monitor.

## 2019-08-07 NOTE — DISCHARGE SUMMARY
[facility] Hospital Medicine  Discharge Summary      Patient Name: Shireen Felix  MRN: [id number]  Admission Date: 8/4/2019  Hospital Length of Stay: 0 days  Discharge Date and Time:  08/07/2019 8:52 AM  Attending Physician: Josh Solano MD   Discharging Provider: Josh Solano MD  Primary Care Provider: Jossie Ac MD      HPI:       73 y.o. female patient with a PMHx of seizure amongsts other co morbidities  presents to the Emergency Department for evaluation after a fall which onset last night.   She has a history of recurrent falls ,she lost her balance and fell on her left side at home last night      As a result of this fall she is sufferingleft hip pain .  This is worse with movement and  weight-bearing.      She  denies syncope. Patient  was seen at this facility by Dr. Bautista on Wednesday for evaluation  Fall.      Patient is pending placement this upcoming week in rehab.     ER evaluation was significant for wbc 13.89 , CO2 17,creatinine of 1.9  urine positive for nitrates amd hyaline cast  , mild ketones in urine     * No surgery found *      Hospital Course:   08/05   Complaints of epigastric tenderness    SOB with wheezing      08/06    CT of Lumbar spine -L4 and L5 nerve root impingement.     Old compression fracture of the T12 vertebral body   Continue Neuro checks   Neurosurgery consulted   Discharge held pending evaluation per neuro surgery     08/07   Patient will be discharged to Rehab   Neurosurgical  Follow up will be done outpt   Seen and examined , stable for discharge         Consults:   Consults (From admission, onward)        Status Ordering Provider     Inpatient consult to Cardiology  Once     Provider:  (Not yet assigned)    Completed JOSH SOLANO     Inpatient consult to Neurosurgery  Once     Provider:  (Not yet assigned)    Acknowledged JOSH SOLANO     IP consult to case management  Once     Provider:  (Not yet assigned)    Completed  JOSH FLOREZ          No new Assessment & Plan notes have been filed under this hospital service since the last note was generated.  Service: Hospital Medicine    Final Active Diagnoses:    Diagnosis Date Noted POA    PRINCIPAL PROBLEM:  Recurrent falls [R29.6] 08/04/2019 Not Applicable    Neuroforaminal stenosis of lumbar spine [M99.83] 08/06/2019 Yes    CAD in native artery [I25.10] 08/05/2019 Yes    DM (diabetes mellitus) type II controlled with renal manifestation [E11.29] 08/04/2019 Yes    CHF (congestive heart failure) [I50.9] 06/06/2019 Yes    Focal seizures [R56.9] 09/12/2018 Yes    Coronary artery disease [I25.10] 08/01/2018 Yes    HTN (hypertension) [I10] 01/12/2015 Yes      Problems Resolved During this Admission:    Diagnosis Date Noted Date Resolved POA    Atypical chest pain [R07.89] 08/05/2019 08/07/2019 Yes    Elevated troponin [R74.8] 08/05/2019 08/07/2019 Yes    Abnormal ECG [R94.31] 08/05/2019 08/07/2019 Yes    Acute renal failure with acute cortical necrosis [N17.1] 08/04/2019 08/07/2019 Yes    DM (diabetes mellitus) type 2, uncontrolled, with ketoacidosis [E11.10] 08/04/2019 08/04/2019 Yes    COPD exacerbation [J44.1] 08/04/2019 08/07/2019 Yes    Acute cystitis [N30.00] 08/04/2019 08/07/2019 Yes       Discharged Condition: good    Disposition: Home or Self Care    Follow Up:  Follow-up Information     [facility]    Specialties:  Physical Therapy, Occupational Therapy  Why:  Rehab  Contact information:  [address] [facility] [address] [facility]  [place] [address]  [phone]             Jossie Ac MD.    Specialty:  Family Medicine  Contact information:  [address]  [place] [address] [phone]                 Patient Instructions:   No discharge procedures on file.    Significant Diagnostic Studies: Labs:   BMP:   Recent Labs   Lab 08/05/19  0957   GLU 59*      K 3.9   *   CO2 17*   BUN 31*   CREATININE 1.3   CALCIUM 8.7   , CMP   Recent Labs   Lab  08/05/19  0957      K 3.9   *   CO2 17*   GLU 59*   BUN 31*   CREATININE 1.3   CALCIUM 8.7   ANIONGAP 9   ESTGFRAFRICA 47*   EGFRNONAA 41*    and CBC   Recent Labs   Lab 08/06/19  0715   WBC 8.22   HGB 11.2*   HCT 33.9*          Pending Diagnostic Studies:     None         Medications:  Reconciled Home Medications:      Medication List      START taking these medications    GI COCKTAIL SIMPLE RECORD  Take 50 mLs by mouth 2 (two) times daily.        CHANGE how you take these medications    cholecalciferol (vitamin D3) 1,000 unit capsule  Commonly known as:  VITAMIN D3  Take 10 capsules (10,000 Units total) by mouth once daily. Twice weekly  What changed:    · medication strength  · when to take this     lidocaine-prilocaine cream  Commonly known as:  EMLA  Apply topically once daily.  What changed:  See the new instructions.     oxyCODONE-acetaminophen 5-325 mg per tablet  Commonly known as:  PERCOCET  Take 1 tablet by mouth every 6 (six) hours as needed for Pain.  What changed:  when to take this     tolterodine 2 MG Tab  Commonly known as:  DETROL  Take 2 tablets (4 mg total) by mouth once daily. Take 4 mg by mouth once daily.  What changed:  additional instructions     venlafaxine 37.5 MG 24 hr capsule  Commonly known as:  EFFEXOR-XR  Take 1 capsule (37.5 mg total) by mouth once daily.  What changed:    · medication strength  · how much to take        CONTINUE taking these medications    (MAGIC MOUTHWASH) 1:1:1 BENADRYL 12.5MG/5ML LIQ, ALUMINUM & MAGNESIUM  Swish and spit 15 mLs every 4 (four) hours as needed. for mouth sores     albuterol 90 mcg/actuation inhaler  Commonly known as:  PROVENTIL/VENTOLIN HFA  Inhale 2 puffs into the lungs every 4 (four) hours as needed.     albuterol-ipratropium 2.5 mg-0.5 mg/3 mL nebulizer solution  Commonly known as:  DUO-NEB  Take 3 mLs by nebulization every 6 (six) hours as needed for Wheezing. Rescue     amLODIPine 10 MG tablet  Commonly known as:   NORVASC  Take 1 tablet (10 mg total) by mouth once daily.     benzonatate 100 MG capsule  Commonly known as:  TESSALON  Take 1 capsule (100 mg total) by mouth every 4 (four) hours as needed for Cough.     clopidogrel 75 mg tablet  Commonly known as:  PLAVIX  Take 1 tablet (75 mg total) by mouth once daily.     dexlansoprazole 60 mg capsule  Commonly known as:  DEXILANT  Take 1 capsule (60 mg total) by mouth once daily.     DULoxetine 30 MG capsule  Commonly known as:  CYMBALTA  Take 1 capsule (30 mg total) by mouth once daily.     escitalopram oxalate 20 MG tablet  Commonly known as:  LEXAPRO  Take 1 tablet (20 mg total) by mouth once daily.     gemfibrozil 600 MG tablet  Commonly known as:  LOPID  Take 1 tablet (600 mg total) by mouth once daily.     guaifenesin 100 mg/5 ml 100 mg/5 mL syrup  Commonly known as:  ROBITUSSIN  Take 5-10 mLs (100-200 mg total) by mouth every 4 (four) hours as needed for Cough.     hydrocodone-chlorpheniramine 10-8 mg/5 mL suspension  Commonly known as:  TUSSIONEX  Take 5 mLs by mouth every 12 (twelve) hours as needed for Cough or Congestion.     metFORMIN 500 MG tablet  Commonly known as:  GLUCOPHAGE  Take 1 tablet (500 mg total) by mouth once daily.     metoprolol tartrate 100 MG tablet  Commonly known as:  LOPRESSOR  Take 1 tablet (100 mg total) by mouth 2 (two) times daily.     ondansetron 4 MG tablet  Commonly known as:  ZOFRAN  Take 1 tablet (4 mg total) by mouth every 6 (six) hours.     ondansetron 4 MG Tbdl  Commonly known as:  ZOFRAN-ODT  Take 1 tablet (4 mg total) by mouth every 8 (eight) hours as needed (nausea/vomiting).     predniSONE 10 MG tablet  Commonly known as:  DELTASONE  40 mg PO QD X 3 days then 30 mg PO QD x 3 days then 20 mg PO QD X 3 days then 10 mg PO QD X 3 days then 5 mg PO QD x 3 days then stop.     promethazine 6.25 mg/5 mL syrup  Commonly known as:  PHENERGAN  Take 5 mLs (6.25 mg total) by mouth every 6 (six) hours as needed (cough).     ranitidine 75 MG  tablet  Commonly known as:  ZANTAC  Take 2 tablets (150 mg total) by mouth 2 (two) times daily.     ranolazine 1,000 mg Tb12  Commonly known as:  RANEXA  Take 1 tablet (1,000 mg total) by mouth 2 (two) times daily.     temazepam 15 mg Cap  Commonly known as:  RESTORIL  Take 1 capsule (15 mg total) by mouth every evening.     tiotropium 18 mcg inhalation capsule  Commonly known as:  SPIRIVA  Inhale 1 capsule (18 mcg total) into the lungs once daily.     valsartan 320 MG tablet  Commonly known as:  DIOVAN  Take 1 tablet (320 mg total) by mouth once daily.        STOP taking these medications    diphenhydrAMINE 50 MG capsule  Commonly known as:  BENADRYL     furosemide 20 MG tablet  Commonly known as:  LASIX     levoFLOXacin 750 MG tablet  Commonly known as:  LEVAQUIN     naproxen 375 MG tablet  Commonly known as:  NAPROSYN     nebulizer accessories Kit     turmeric root extract 500 mg Cap            Indwelling Lines/Drains at time of discharge:   Lines/Drains/Airways          None          Time spent on the discharge of patient: 40 minutes  Patient was seen and examined on the date of discharge and determined to be suitable for discharge.         Stephenie Solano MD  Department of Hospital Medicine  [facility]

## 2019-08-07 NOTE — TELEPHONE ENCOUNTER
Spoke with patient brother stated that the patient is continually falling and in the hospital. Just wanted to tell her  Told him we are sorry she is dealing with that and anything we can do to help contact us . No further concerns. All questions answered.

## 2019-09-05 ENCOUNTER — CLINICAL SUPPORT (OUTPATIENT)
Dept: PULMONOLOGY | Facility: CLINIC | Age: 74
End: 2019-09-05
Payer: MEDICARE

## 2019-09-05 ENCOUNTER — HOSPITAL ENCOUNTER (OUTPATIENT)
Dept: RADIOLOGY | Facility: HOSPITAL | Age: 74
Discharge: HOME | End: 2019-09-05
Attending: INTERNAL MEDICINE
Payer: MEDICARE

## 2019-09-05 ENCOUNTER — OFFICE VISIT (OUTPATIENT)
Dept: PULMONOLOGY | Facility: CLINIC | Age: 74
End: 2019-09-05
Payer: MEDICARE

## 2019-09-05 VITALS
OXYGEN SATURATION: 99 % | HEART RATE: 97 BPM | WEIGHT: 142 LBS | SYSTOLIC BLOOD PRESSURE: 142 MMHG | BODY MASS INDEX: 24.24 KG/M2 | HEIGHT: 64 IN | RESPIRATION RATE: 20 BRPM | DIASTOLIC BLOOD PRESSURE: 70 MMHG

## 2019-09-05 DIAGNOSIS — J84.112 UIP (USUAL INTERSTITIAL PNEUMONITIS): Chronic | ICD-10-CM

## 2019-09-05 DIAGNOSIS — R06.89 DYSPNEA AND RESPIRATORY ABNORMALITIES: Primary | ICD-10-CM

## 2019-09-05 DIAGNOSIS — J30.9 CHRONIC ALLERGIC RHINITIS: Chronic | ICD-10-CM

## 2019-09-05 DIAGNOSIS — J84.112 UIP (USUAL INTERSTITIAL PNEUMONITIS): ICD-10-CM

## 2019-09-05 DIAGNOSIS — R06.00 DYSPNEA AND RESPIRATORY ABNORMALITIES: Primary | ICD-10-CM

## 2019-09-05 DIAGNOSIS — J41.1 MUCOPURULENT CHRONIC BRONCHITIS: Chronic | ICD-10-CM

## 2019-09-05 DIAGNOSIS — J41.1 MUCOPURULENT CHRONIC BRONCHITIS: Primary | Chronic | ICD-10-CM

## 2019-09-05 LAB
ALLENS TEST: ABNORMAL
BRPFT: ABNORMAL
DELSYS: ABNORMAL
DLCO ADJ PRE: 11.7 ML/(MIN*MMHG) (ref 15.07–26.54)
DLCO SINGLE BREATH LLN: 15.07
DLCO SINGLE BREATH PRE REF: 52 %
DLCO SINGLE BREATH REF: 20.8
DLCOC SBVA LLN: 2.76
DLCOC SBVA PRE REF: 88.9 %
DLCOC SBVA REF: 4.19
DLCOC SINGLE BREATH LLN: 15.07
DLCOC SINGLE BREATH PRE REF: 56.2 %
DLCOC SINGLE BREATH REF: 20.8
DLCOVA LLN: 2.76
DLCOVA PRE REF: 82.3 %
DLCOVA PRE: 3.44 ML/(MIN*MMHG*L) (ref 2.76–5.61)
DLCOVA REF: 4.19
DLVAADJ PRE: 3.72 ML/(MIN*MMHG*L) (ref 2.76–5.61)
FEF 25 75 LLN: 0.76
FEF 25 75 PRE REF: 164.4 %
FEF 25 75 REF: 1.72
FEV1 FVC LLN: 64
FEV1 FVC PRE REF: 114.4 %
FEV1 FVC REF: 78
FEV1 LLN: 1.49
FEV1 PRE REF: 95.6 %
FEV1 REF: 2.09
FVC LLN: 1.95
FVC PRE REF: 82.7 %
FVC REF: 2.72
HCO3 UR-SCNC: 19.4 MMOL/L (ref 24–28)
IVC PRE: 2.1 L (ref 1.95–3.49)
IVC SINGLE BREATH LLN: 1.95
IVC SINGLE BREATH PRE REF: 77.3 %
IVC SINGLE BREATH REF: 2.72
MODE: ABNORMAL
MVV LLN: 68
MVV PRE REF: 49 %
MVV REF: 80
PCO2 BLDA: 32.7 MMHG (ref 35–45)
PEF LLN: 3.64
PEF PRE REF: 112 %
PEF REF: 5.36
PH SMN: 7.38 [PH] (ref 7.35–7.45)
PO2 BLDA: 92 MMHG (ref 80–100)
POC BE: -6 MMOL/L
POC SATURATED O2: 97 % (ref 95–100)
PRE DLCO: 10.83 ML/(MIN*MMHG) (ref 15.07–26.54)
PRE FEF 25 75: 2.83 L/S (ref 0.76–2.68)
PRE FET 100: 7.61 SEC
PRE FEV1 FVC: 88.75 % (ref 63.64–91.46)
PRE FEV1: 2 L (ref 1.49–2.68)
PRE FVC: 2.25 L (ref 1.95–3.49)
PRE MVV: 39 L/MIN (ref 67.61–91.47)
PRE PEF: 6 L/S (ref 3.64–7.08)
SAMPLE: ABNORMAL
SITE: ABNORMAL
VA PRE: 3.14 L (ref 4.82–4.82)
VA SINGLE BREATH LLN: 4.82
VA SINGLE BREATH PRE REF: 65.3 %
VA SINGLE BREATH REF: 4.82

## 2019-09-05 PROCEDURE — 71250 CT THORAX DX C-: CPT | Mod: TC

## 2019-09-05 PROCEDURE — 82803 BLOOD GASES ANY COMBINATION: CPT | Mod: PBBFAC

## 2019-09-05 PROCEDURE — 99215 OFFICE O/P EST HI 40 MIN: CPT | Mod: 25,S$PBB,, | Performed by: INTERNAL MEDICINE

## 2019-09-05 PROCEDURE — 94729 DIFFUSING CAPACITY: CPT | Mod: 26,S$PBB,, | Performed by: INTERNAL MEDICINE

## 2019-09-05 PROCEDURE — 99215 PR OFFICE/OUTPT VISIT, EST, LEVL V, 40-54 MIN: ICD-10-PCS | Mod: 25,S$PBB,, | Performed by: INTERNAL MEDICINE

## 2019-09-05 PROCEDURE — 99999 PR PBB SHADOW E&M-EST. PATIENT-LVL III: CPT | Mod: PBBFAC,,, | Performed by: INTERNAL MEDICINE

## 2019-09-05 PROCEDURE — 94010 BREATHING CAPACITY TEST: CPT | Mod: 26,S$PBB,, | Performed by: INTERNAL MEDICINE

## 2019-09-05 PROCEDURE — 99213 OFFICE O/P EST LOW 20 MIN: CPT | Mod: PBBFAC,25 | Performed by: INTERNAL MEDICINE

## 2019-09-05 PROCEDURE — 36600 WITHDRAWAL OF ARTERIAL BLOOD: CPT | Mod: 59,S$PBB,, | Performed by: INTERNAL MEDICINE

## 2019-09-05 PROCEDURE — 36600 PR WITHDRAWAL OF ARTERIAL BLOOD: ICD-10-PCS | Mod: 59,S$PBB,, | Performed by: INTERNAL MEDICINE

## 2019-09-05 PROCEDURE — 99999 PR PBB SHADOW E&M-EST. PATIENT-LVL III: ICD-10-PCS | Mod: PBBFAC,,, | Performed by: INTERNAL MEDICINE

## 2019-09-05 PROCEDURE — 94729 DIFFUSING CAPACITY: CPT | Mod: PBBFAC

## 2019-09-05 PROCEDURE — 94010 BREATHING CAPACITY TEST: CPT | Mod: PBBFAC

## 2019-09-05 PROCEDURE — 36600 WITHDRAWAL OF ARTERIAL BLOOD: CPT | Mod: PBBFAC

## 2019-09-05 PROCEDURE — 94010 BREATHING CAPACITY TEST: ICD-10-PCS | Mod: 26,S$PBB,, | Performed by: INTERNAL MEDICINE

## 2019-09-05 PROCEDURE — 94729 PR C02/MEMBANE DIFFUSE CAPACITY: ICD-10-PCS | Mod: 26,S$PBB,, | Performed by: INTERNAL MEDICINE

## 2019-09-05 RX ORDER — AZITHROMYCIN 500 MG/1
500 TABLET, FILM COATED ORAL DAILY
Qty: 10 TABLET | Refills: 0 | Status: SHIPPED | OUTPATIENT
Start: 2019-09-05 | End: 2019-09-05 | Stop reason: SDUPTHER

## 2019-09-05 RX ORDER — PROMETHAZINE HYDROCHLORIDE AND DEXTROMETHORPHAN HYDROBROMIDE 6.25; 15 MG/5ML; MG/5ML
5 SYRUP ORAL EVERY 4 HOURS PRN
Qty: 240 ML | Refills: 0 | Status: SHIPPED | OUTPATIENT
Start: 2019-09-05 | End: 2019-09-15

## 2019-09-05 RX ORDER — PROMETHAZINE HYDROCHLORIDE AND DEXTROMETHORPHAN HYDROBROMIDE 6.25; 15 MG/5ML; MG/5ML
5 SYRUP ORAL EVERY 4 HOURS PRN
Qty: 240 ML | Refills: 0 | Status: SHIPPED | OUTPATIENT
Start: 2019-09-05 | End: 2019-09-05 | Stop reason: SDUPTHER

## 2019-09-05 RX ORDER — AZITHROMYCIN 500 MG/1
500 TABLET, FILM COATED ORAL DAILY
Qty: 10 TABLET | Refills: 0 | Status: SHIPPED | OUTPATIENT
Start: 2019-09-05 | End: 2019-09-15

## 2019-09-05 RX ORDER — PREDNISONE 10 MG/1
TABLET ORAL
Qty: 60 TABLET | Refills: 0 | Status: SHIPPED | OUTPATIENT
Start: 2019-09-05 | End: 2019-09-05 | Stop reason: SDUPTHER

## 2019-09-05 RX ORDER — PREDNISONE 10 MG/1
TABLET ORAL
Qty: 60 TABLET | Refills: 0 | Status: SHIPPED | OUTPATIENT
Start: 2019-09-05 | End: 2019-12-03 | Stop reason: ALTCHOICE

## 2019-09-05 NOTE — PATIENT INSTRUCTIONS
Dextromethorphan; Promethazine oral solution  What is this medicine?  DEXTROMETHORPHAN; PROMETHAZINE (dex troe meth OR fan; proe METH a zeen) is a cough suppressant and an antihistamine. It is used to treat coughing due to colds or allergies. This medicine will not treat an infection.  How should I use this medicine?  Take this medicine by mouth with a glass of water. Follow the directions on the prescription label. Use a specially marked spoon or container to measure your medicine. Household spoons are not accurate. Take your doses at regular intervals. Do not take your medicine more often than directed.  Talk to your pediatrician regarding the use of this medicine in children. Special care may be needed. Do not use this medicine in children less than 2 years of age.  Patients over 65 years old may have a stronger reaction and need a smaller dose.  What side effects may I notice from receiving this medicine?  Side effects that you should report to your doctor or health care professional as soon as possible:  · allergic reactions like skin rash, itching or hives, swelling of the face, lips, or tongue  · breathing problems  · changes in vision  · confused, disoriented, excitable  · fast, irregular heartbeat  · fever, sweating  · hallucinations  · high or low blood pressure  · lightheaded  · muscle stiffness  · seizures  · tremors, twitches  · yellow eyes or skin  Side effects that usually do not require medical attention (report to your doctor or health care professional if they continue or are bothersome):  · congestion in the nose  · dry mouth  · nausea, vomiting  · stomach upset  · trouble sleeping  What may interact with this medicine?  Do not take this medicine with any of the following medications:  · MAOIs like Carbex, Eldepryl, Marplan, Nardil, and Parnate  This medicine may also interact with the following medications:  · alcohol or alcohol-containing products  · barbiturate medicines like  phenobarbital  · epinephrine  · medicines for depression, anxiety or psychotic disturbances  · medicines for Parkinson's disease  · medicines for sleep  · medicines for the stomach like metoclopramide, dicyclomine, glycopyrrolate  · pain medicines  · radio contrast dyes  · sibutramine  · some medicines for cold or allergies  What if I miss a dose?  If you miss a dose, take it as soon as you can. If it is almost time for your next dose, take only that dose. Do not take double or extra doses.  Where should I keep my medicine?  Keep out of the reach of children.  Store at room temperature between 20 and 25 degrees C (68 and 77 degrees F). Protect from light. Throw away any unused medicine after the expiration date.  What should I tell my health care provider before I take this medicine?  They need to know if you have any of the following conditions:  · asthma or other lung disease  · diabetes  · eczema  · seizure disorder  · serious or chronic illness  · sleep apnea  · an unusual or allergic reaction to dextromethorphan, promethazine, phenothiazines, other medicines, foods, dyes, or preservatives  · pregnant or trying to get pregnant  · breast-feeding  What should I watch for while using this medicine?  Tell your doctor if your symptoms do not improve or if they get worse.  You may get drowsy or dizzy. Do not drive, use machinery, or do anything that needs mental alertness until you know how this medicine affects you. Do not stand or sit up quickly, especially if you are an older patient. This reduces the risk of dizzy or fainting spells. Alcohol may interfere with the effect of this medicine. Avoid alcoholic drinks.  This medicine can make you more sensitive to the sun. Keep out of the sun. If you cannot avoid being in the sun, wear protective clothing and use sunscreen. Do not use sun lamps or tanning beds/booths.  NOTE:This sheet is a summary. It may not cover all possible information. If you have questions about  this medicine, talk to your doctor, pharmacist, or health care provider. Copyright© 2017 [employer]

## 2019-09-05 NOTE — PROGRESS NOTES
Subjective:      Established patient    Patient ID: Shireen Felix is a 73 y.o. female.  Patient Active Problem List   Diagnosis    Nasal septal perforation    Postherpetic neuralgia    CHF (congestive heart failure)    Conductive hearing loss, unilateral    Chronic otitis media    Chronic allergic rhinitis    Coronary artery disease    CVA (cerebral vascular accident)    Elevated IgE level    Focal seizures    GERD (gastroesophageal reflux disease)    History of cerebral aneurysm repair    History of tobacco abuse    HTN (hypertension)    Iron deficiency anemia    Spinal stenosis of lumbar region with neurogenic claudication    Mucopurulent chronic bronchitis    UIP (usual interstitial pneumonitis)    Recurrent falls    DM (diabetes mellitus) type II controlled with renal manifestation    CAD in native artery    Neuroforaminal stenosis of lumbar spine       Problem list has been reviewed.    Chief Complaint: COPD      HPI    CT chest, PFT, 6 MWT and ABG reviewed with patient who expressed and voiced understanding.   All questions were answered to the patients satisfaction.      Patients reports NYHA II dyspnea    The patient does not have currently have symptoms / an exacerbation.         She reports ongoing cough and sputum production. Sputum is blood tinged.      A full  review of systems, past , family  and social histories was performed except as mentioned in the note above, these are non contributory to the main issues discussed today.       Previous Report Reviewed: lab reports, office notes and radiology reports     The following portions of the patient's history were reviewed and updated as appropriate: She  has a past medical history of Abnormal ECG (8/5/2019), Aneurysm, Anticoagulant long-term use, Arthritis, CAD in native artery (8/5/2019), COPD (chronic obstructive pulmonary disease), Diabetes mellitus, Glaucoma, Hypertension, Seizures, Shingles (05/27/2017), and Stroke.  She   has a past surgical history that includes Brain surgery; Hysterectomy; and Transforaminal epidural injection of steroid (Bilateral, 7/23/2019).  Her family history is not on file.  She  reports that she quit smoking about 15 years ago. Her smoking use included cigarettes. She has a 10.00 pack-year smoking history. She has never used smokeless tobacco. She reports that she does not drink alcohol or use drugs.  She has a current medication list which includes the following prescription(s): albuterol, albuterol-ipratropium, amlodipine, azithromycin, benzonatate, cholecalciferol (vitamin d3), clopidogrel, dexlansoprazole, duloxetine, escitalopram oxalate, gemfibrozil, dicyclomine hcl, hydrocodone-chlorpheniramine, metformin, metoprolol tartrate, ondansetron, oxycodone-acetaminophen, prednisone, promethazine, promethazine-dextromethorphan, ranolazine, temazepam, tiotropium, tolterodine, valsartan, and venlafaxine.  She is allergic to doxycycline; penicillins; oxycodone; adhesive; iodine and iodide containing products; povidone-iodine; and sulfa (sulfonamide antibiotics)..    Review of Systems   Constitutional: Negative for fever and chills.   HENT: Positive for rhinorrhea, congestion and hearing loss. Negative for nosebleeds.    Eyes: Negative for redness.   Respiratory: Positive for cough, sputum production, wheezing, dyspnea on extertion and use of rescue inhaler. Negative for choking.    Genitourinary: Negative for hematuria.   Endocrine: Diabetes melllitus Negative for cold intolerance.    Musculoskeletal: Positive for arthralgias and back pain.   Skin: Negative for rash.   Gastrointestinal: Positive for acid reflux. Negative for vomiting.   Neurological: Negative for syncope and headaches.        History of stroke  Seizure disorder.  Post herpetic neuralgia   Hematological: Negative for adenopathy. Bleeds easily and excessive bruising.   Psychiatric/Behavioral: Negative for confusion.        Objective:     BP (!)  "142/70   Pulse 97   Resp 20   Ht 5' 4.17" (1.63 m)   Wt 64.4 kg (141 lb 15.6 oz)   SpO2 99%   BMI 24.24 kg/m²   Body mass index is 24.24 kg/m².     Physical Exam   Constitutional: She is oriented to person, place, and time. She appears well-developed and well-nourished. She appears distressed.   HENT:   Head: Normocephalic and atraumatic.   Right Ear: Tympanic membrane normal.   Left Ear: Tympanic membrane normal.   Nose: Mucosal edema and rhinorrhea present. Right sinus exhibits maxillary sinus tenderness. Right sinus exhibits no frontal sinus tenderness. Left sinus exhibits maxillary sinus tenderness. Left sinus exhibits no frontal sinus tenderness.   Mouth/Throat: Posterior oropharyngeal erythema present. No oropharyngeal exudate, posterior oropharyngeal edema or tonsillar abscesses.   Eyes: Pupils are equal, round, and reactive to light. EOM are normal.   Neck: Normal range of motion. Neck supple.   Cardiovascular: Normal rate, regular rhythm, normal heart sounds and intact distal pulses.   No murmur heard.  Pulmonary/Chest: Effort normal. No stridor. No respiratory distress. She has wheezes. She has no rales.   Frequent cough   Abdominal: Soft. Bowel sounds are normal.   Musculoskeletal: Normal range of motion. She exhibits no edema or tenderness.   Neurological: She is alert and oriented to person, place, and time.   Skin: Skin is warm and dry. Rash (left forehead with healing zoster rash) noted. No pallor.   Psychiatric: She has a normal mood and affect. Her behavior is normal.       Personal Diagnostic Review      AB19:     19  1413   PH 7.381   PCO2 32.7*   PO2 92   HCO3 19.4*   POCSATURATED 97   BE -6     Mixed respiratory alkalosis / metabolic acidosis, with normal anion gap    (expected Pco2 = 35 - 39)    expected pH = 7.38  expected CO2 = 33  expected HCO3- = 19    Pulmonary function tests:19:  FEV1: 2.00  (95.6 % predicted), FVC:  2.25 (82.7 % predicted), FEV1/FVC:  88.75, " TLC:  Not done RV/TLVC: Not done, DLCO: 3.63 (52.0 % predicted)  Normal spirometry. Lung volumes not done. Diffusion capacity is moderately reduced but corrects for alveolar volume.         CT of chest performed on 09/05/19 with contrast:       There is a 0.6 cm noncalcified pulmonary nodule in the right lower lobe on image 36, image 62.  This does not appear significantly changed when compared with previous exam no new pulmonary nodule or infiltrate is demonstrated.  No pneumothorax or pleural effusion is present.  There are chronic changes of interstitial lung disease with mild lower lobe bronchiectasis.  Early honeycombing and subpleural changes seen in the lung bases bilaterally.  Fine increased reticulonodular interstitial markings are seen juxtapleural regions as well.    There is atherosclerosis of the aorta coronary vessels.  The heart is normal in size.  There is no pericardial effusion.  No enlarged mediastinal, hilar or axillary lymph nodes are identified.    Images the upper abdomen demonstrate fatty change of pancreas.  No acute findings are demonstrated.    Chronic degenerative changes of the spine are again demonstrated.        Assessment / Plan:       Discussed diagnosis, its evaluation, treatment and usual course. All questions answered.    Problem List Items Addressed This Visit        Pulmonary    Mucopurulent chronic bronchitis - Primary (Chronic)    Current Assessment & Plan       AZITHROMYCIN 500MG PO DAILY X 10 DAYS  PREDNISONE TAPER PER ORDERS.  BENZONATATE 100mg PO q 4 hours.       Re evaluate in 1 month.           Relevant Medications    azithromycin (ZITHROMAX) 500 MG tablet    predniSONE (DELTASONE) 10 MG tablet    promethazine-dextromethorphan (PROMETHAZINE-DM) 6.25-15 mg/5 mL Syrp    UIP (usual interstitial pneumonitis)    Current Assessment & Plan     Stable on CT chest.    Radiologic surveillance.      PLAN:  Discussed diagnosis, its evaluation, treatment and usual course.  All  questions answered.        Re evaluate in four weeks.            Other    Chronic allergic rhinitis    Current Assessment & Plan     Continue ALLEGRA  Continue SINGULAIR  Continue FLONASE.                 TIME SPENT WITH PATIENT: Time spent: 45 minutes in face to face  discussion concerning diagnosis, prognosis, review of lab and test results, benefits of treatment as well as management of disease, counseling of patient and coordination of care between various health  care providers . Greater than half the time spent was used for coordination of care and counseling of patient.       Follow up in about 1 month (around 10/5/2019) for Allergic Rhinitis, Pulmonary Fibrosis, Bronchitis.

## 2019-09-05 NOTE — PROCEDURES
PHYSICIAN INTERPRETATION:    Recent Labs     09/05/19  1413   PH 7.381   PCO2 32.7*   PO2 92   HCO3 19.4*   POCSATURATED 97   BE -6       Mixed respiratory alkalosis / metabolic acidosis, with normal anion gap     (expected Pco2 = 35 - 39)     expected pH = 7.38  expected CO2 = 33  expected HCO3- = 19

## 2019-09-05 NOTE — ASSESSMENT & PLAN NOTE
Stable on CT chest.    Radiologic surveillance.      PLAN:  Discussed diagnosis, its evaluation, treatment and usual course.  All questions answered.        Re evaluate in four weeks.

## 2019-09-05 NOTE — ASSESSMENT & PLAN NOTE
AZITHROMYCIN 500MG PO DAILY X 10 DAYS  PREDNISONE TAPER PER ORDERS.  BENZONATATE 100mg PO q 4 hours.       Re evaluate in 1 month.

## 2019-09-10 ENCOUNTER — OFFICE VISIT (OUTPATIENT)
Dept: NEUROSURGERY | Facility: CLINIC | Age: 74
End: 2019-09-10
Payer: MEDICARE

## 2019-09-10 ENCOUNTER — TELEPHONE (OUTPATIENT)
Dept: PAIN MEDICINE | Facility: CLINIC | Age: 74
End: 2019-09-10

## 2019-09-10 ENCOUNTER — TELEPHONE (OUTPATIENT)
Dept: PULMONOLOGY | Facility: CLINIC | Age: 74
End: 2019-09-10

## 2019-09-10 VITALS
HEIGHT: 64 IN | SYSTOLIC BLOOD PRESSURE: 159 MMHG | DIASTOLIC BLOOD PRESSURE: 86 MMHG | HEART RATE: 96 BPM | BODY MASS INDEX: 24.24 KG/M2 | RESPIRATION RATE: 18 BRPM | WEIGHT: 142 LBS

## 2019-09-10 DIAGNOSIS — M54.16 LUMBAR RADICULOPATHY: ICD-10-CM

## 2019-09-10 DIAGNOSIS — M43.16 SPONDYLOLISTHESIS, LUMBAR REGION: ICD-10-CM

## 2019-09-10 DIAGNOSIS — M48.062 LUMBAR STENOSIS WITH NEUROGENIC CLAUDICATION: ICD-10-CM

## 2019-09-10 DIAGNOSIS — M51.36 DEGENERATIVE DISC DISEASE, LUMBAR: Primary | ICD-10-CM

## 2019-09-10 PROCEDURE — 99999 PR PBB SHADOW E&M-EST. PATIENT-LVL V: ICD-10-PCS | Mod: PBBFAC,,, | Performed by: NEUROLOGICAL SURGERY

## 2019-09-10 PROCEDURE — 99214 OFFICE O/P EST MOD 30 MIN: CPT | Mod: S$PBB,,, | Performed by: NEUROLOGICAL SURGERY

## 2019-09-10 PROCEDURE — 99215 OFFICE O/P EST HI 40 MIN: CPT | Mod: PBBFAC | Performed by: NEUROLOGICAL SURGERY

## 2019-09-10 PROCEDURE — 99999 PR PBB SHADOW E&M-EST. PATIENT-LVL V: CPT | Mod: PBBFAC,,, | Performed by: NEUROLOGICAL SURGERY

## 2019-09-10 PROCEDURE — 99214 PR OFFICE/OUTPT VISIT, EST, LEVL IV, 30-39 MIN: ICD-10-PCS | Mod: S$PBB,,, | Performed by: NEUROLOGICAL SURGERY

## 2019-09-10 NOTE — TELEPHONE ENCOUNTER
Contacted patient to schedule sooner appointment. Patient did not answer. Left message.            ----- Message from Jesi Castellon LPN sent at 9/10/2019 11:31 AM CDT -----  Hello     Can you schedule pt an appt with Dr. Dias for eval and treat    Thanks   Cherise

## 2019-09-10 NOTE — TELEPHONE ENCOUNTER
----- Message from Nat Nath sent at 9/10/2019 10:46 AM CDT -----  Contact: [facility] Nurse   [facility]  Is calling regarding requesting Pt  Progress Report on last office visit. ..ph#[phone] Fax#[phone]        .Thank You  Nat Nath

## 2019-09-12 ENCOUNTER — OFFICE VISIT (OUTPATIENT)
Dept: PAIN MEDICINE | Facility: CLINIC | Age: 74
End: 2019-09-12
Payer: MEDICARE

## 2019-09-12 ENCOUNTER — HOSPITAL ENCOUNTER (OUTPATIENT)
Dept: RADIOLOGY | Facility: HOSPITAL | Age: 74
Discharge: HOME | End: 2019-09-12
Attending: PHYSICIAN ASSISTANT
Payer: MEDICARE

## 2019-09-12 ENCOUNTER — OFFICE VISIT (OUTPATIENT)
Dept: CARDIOLOGY | Facility: CLINIC | Age: 74
End: 2019-09-12
Payer: MEDICARE

## 2019-09-12 ENCOUNTER — CLINICAL SUPPORT (OUTPATIENT)
Dept: CARDIOLOGY | Facility: CLINIC | Age: 74
End: 2019-09-12
Payer: MEDICARE

## 2019-09-12 VITALS
SYSTOLIC BLOOD PRESSURE: 170 MMHG | DIASTOLIC BLOOD PRESSURE: 70 MMHG | BODY MASS INDEX: 23.76 KG/M2 | HEART RATE: 124 BPM | WEIGHT: 138.44 LBS

## 2019-09-12 VITALS
DIASTOLIC BLOOD PRESSURE: 84 MMHG | SYSTOLIC BLOOD PRESSURE: 144 MMHG | HEIGHT: 64 IN | HEART RATE: 101 BPM | WEIGHT: 138 LBS | BODY MASS INDEX: 23.56 KG/M2 | RESPIRATION RATE: 18 BRPM

## 2019-09-12 DIAGNOSIS — R06.09 DYSPNEA ON EXERTION: ICD-10-CM

## 2019-09-12 DIAGNOSIS — I10 HYPERTENSION, UNSPECIFIED TYPE: ICD-10-CM

## 2019-09-12 DIAGNOSIS — M25.551 HIP PAIN, BILATERAL: ICD-10-CM

## 2019-09-12 DIAGNOSIS — M47.816 LUMBAR SPONDYLOSIS: ICD-10-CM

## 2019-09-12 DIAGNOSIS — I10 ESSENTIAL HYPERTENSION: ICD-10-CM

## 2019-09-12 DIAGNOSIS — I25.118 CORONARY ARTERY DISEASE OF NATIVE ARTERY OF NATIVE HEART WITH STABLE ANGINA PECTORIS: ICD-10-CM

## 2019-09-12 DIAGNOSIS — M47.812 SPONDYLOSIS OF CERVICAL REGION WITHOUT MYELOPATHY OR RADICULOPATHY: ICD-10-CM

## 2019-09-12 DIAGNOSIS — R07.89 OTHER CHEST PAIN: ICD-10-CM

## 2019-09-12 DIAGNOSIS — R56.9 FOCAL SEIZURES: ICD-10-CM

## 2019-09-12 DIAGNOSIS — R00.2 PALPITATIONS: ICD-10-CM

## 2019-09-12 DIAGNOSIS — M54.16 LUMBAR RADICULOPATHY: Primary | ICD-10-CM

## 2019-09-12 DIAGNOSIS — M25.552 HIP PAIN, BILATERAL: ICD-10-CM

## 2019-09-12 DIAGNOSIS — B02.29 POSTHERPETIC NEURALGIA: ICD-10-CM

## 2019-09-12 DIAGNOSIS — M25.511 ACUTE PAIN OF RIGHT SHOULDER: ICD-10-CM

## 2019-09-12 DIAGNOSIS — I25.10 CAD IN NATIVE ARTERY: Primary | ICD-10-CM

## 2019-09-12 DIAGNOSIS — I50.9 CONGESTIVE HEART FAILURE, UNSPECIFIED HF CHRONICITY, UNSPECIFIED HEART FAILURE TYPE: ICD-10-CM

## 2019-09-12 DIAGNOSIS — I63.9 CEREBROVASCULAR ACCIDENT (CVA), UNSPECIFIED MECHANISM: ICD-10-CM

## 2019-09-12 PROCEDURE — 93005 ELECTROCARDIOGRAM TRACING: CPT | Mod: PBBFAC | Performed by: INTERNAL MEDICINE

## 2019-09-12 PROCEDURE — 73030 XR SHOULDER COMPLETE 2 OR MORE VIEWS RIGHT: ICD-10-PCS | Mod: 26,RT,, | Performed by: RADIOLOGY

## 2019-09-12 PROCEDURE — 93010 EKG 12-LEAD: ICD-10-PCS | Mod: S$PBB,,, | Performed by: INTERNAL MEDICINE

## 2019-09-12 PROCEDURE — 99999 PR PBB SHADOW E&M-EST. PATIENT-LVL III: ICD-10-PCS | Mod: PBBFAC,,, | Performed by: INTERNAL MEDICINE

## 2019-09-12 PROCEDURE — 73030 X-RAY EXAM OF SHOULDER: CPT | Mod: TC,RT

## 2019-09-12 PROCEDURE — 99215 PR OFFICE/OUTPT VISIT, EST, LEVL V, 40-54 MIN: ICD-10-PCS | Mod: S$PBB,25,, | Performed by: INTERNAL MEDICINE

## 2019-09-12 PROCEDURE — 99213 OFFICE O/P EST LOW 20 MIN: CPT | Mod: PBBFAC,25 | Performed by: INTERNAL MEDICINE

## 2019-09-12 PROCEDURE — 99999 PR PBB SHADOW E&M-EST. PATIENT-LVL V: ICD-10-PCS | Mod: PBBFAC,,, | Performed by: PHYSICIAN ASSISTANT

## 2019-09-12 PROCEDURE — 99214 PR OFFICE/OUTPT VISIT, EST, LEVL IV, 30-39 MIN: ICD-10-PCS | Mod: S$PBB,,, | Performed by: PHYSICIAN ASSISTANT

## 2019-09-12 PROCEDURE — 99999 PR PBB SHADOW E&M-EST. PATIENT-LVL III: CPT | Mod: PBBFAC,,, | Performed by: INTERNAL MEDICINE

## 2019-09-12 PROCEDURE — 73030 X-RAY EXAM OF SHOULDER: CPT | Mod: 26,RT,, | Performed by: RADIOLOGY

## 2019-09-12 PROCEDURE — 99215 OFFICE O/P EST HI 40 MIN: CPT | Mod: PBBFAC,25,27 | Performed by: PHYSICIAN ASSISTANT

## 2019-09-12 PROCEDURE — 93010 ELECTROCARDIOGRAM REPORT: CPT | Mod: S$PBB,,, | Performed by: INTERNAL MEDICINE

## 2019-09-12 PROCEDURE — 99215 OFFICE O/P EST HI 40 MIN: CPT | Mod: S$PBB,25,, | Performed by: INTERNAL MEDICINE

## 2019-09-12 PROCEDURE — 99214 OFFICE O/P EST MOD 30 MIN: CPT | Mod: S$PBB,,, | Performed by: PHYSICIAN ASSISTANT

## 2019-09-12 PROCEDURE — 99999 PR PBB SHADOW E&M-EST. PATIENT-LVL V: CPT | Mod: PBBFAC,,, | Performed by: PHYSICIAN ASSISTANT

## 2019-09-12 RX ORDER — DILTIAZEM HYDROCHLORIDE EXTENDED-RELEASE TABLETS 120 MG/1
120 TABLET, EXTENDED RELEASE ORAL DAILY
Qty: 30 TABLET | Refills: 11 | Status: SHIPPED | OUTPATIENT
Start: 2019-09-12 | End: 2019-11-14 | Stop reason: SDUPTHER

## 2019-09-12 NOTE — PROGRESS NOTES
Subjective:   Patient ID:  Shireen Felix is a 73 y.o. female who presents for cardiac consult of Coronary Artery Disease (consult)      HPI  The patient came in today for cardiac consult of Coronary Artery Disease (consult)    9/12/19  Shireen Felix is a 73 y.o. female pt with CHF, CAD, prior CVA, seizure disorder, DM type II, HTN, and ANDREI here for follow up CV eval.     She had a fall last month was admitted to [facility] and had cardiac eval for CP with min elevated trop 1st set, ECHO normal, pain thought due to GI etiology.   She has been doing a lot of strenous activity and has been getting more SOB and CP with it. CP feels like pressure, worse with being more tired.   Had recent pulm eval had nodule, had overall stable lung capacity.     Prior CV - Dr. Garcias    Patient feels no leg swelling, no PND, no syncope, no CNS symptoms.    Patient has fairly good exercise tolerance.    Patient is compliant with medications.    ECG - sinus tach V rate 124    8/5/19 ECG  Sinus rhythm with Premature supraventricular complexes  Septal infarct (cited on or before 07-JUN-2019)  Abnormal ECG  When compared with ECG of 31-JUL-2019 11:55,  Premature supraventricular complexes are now Present    2D ECHO  CONCLUSIONS     1 - Normal left ventricular systolic function (EF 60-65%).     2 - Normal left ventricular diastolic function.     3 - Normal right ventricular systolic function .    This document has been electronically    SIGNED BY: Bob Claudio MD On: 08/05/2019 16:32    Past Medical History:   Diagnosis Date    Abnormal ECG 8/5/2019    Aneurysm     Anticoagulant long-term use     Arthritis     CAD in native artery 8/5/2019    COPD (chronic obstructive pulmonary disease)     Diabetes mellitus     Glaucoma     Hypertension     Seizures     Shingles 05/27/2017    Stroke        Past Surgical History:   Procedure Laterality Date    Bilateral L3/4 Transforaminal Epidural Steroid Injection Bilateral 7/23/2019     Performed by [facility] at [facility]    BRAIN SURGERY      CARDIAC CATHETERIZATION      CORONARY ANGIOPLASTY      HYSTERECTOMY         Social History     Tobacco Use    Smoking status: Former Smoker     Packs/day: 1.00     Years: 10.00     Pack years: 10.00     Types: Cigarettes     Last attempt to quit: 4/11/2004     Years since quitting: 15.4    Smokeless tobacco: Never Used   Substance Use Topics    Alcohol use: No    Drug use: Never       Family History   Problem Relation Age of Onset    No Known Problems Mother     No Known Problems Father        Patient's Medications   New Prescriptions    DILTIAZEM (CARDIZEM LA) 120 MG 24 HR TABLET    Take 1 tablet (120 mg total) by mouth once daily.   Previous Medications    ALBUTEROL (PROVENTIL/VENTOLIN HFA) 90 MCG/ACTUATION INHALER    Inhale 2 puffs into the lungs every 4 (four) hours as needed.    ALBUTEROL-IPRATROPIUM (DUO-NEB) 2.5 MG-0.5 MG/3 ML NEBULIZER SOLUTION    Take 3 mLs by nebulization every 6 (six) hours as needed for Wheezing. Rescue    AMLODIPINE (NORVASC) 10 MG TABLET    Take 1 tablet (10 mg total) by mouth once daily.    AZITHROMYCIN (ZITHROMAX) 500 MG TABLET    Take 1 tablet (500 mg total) by mouth once daily. for 10 days    CHOLECALCIFEROL, VITAMIN D3, (VITAMIN D3) 1,000 UNIT CAPSULE    Take 10 capsules (10,000 Units total) by mouth once daily. Twice weekly    CLOPIDOGREL (PLAVIX) 75 MG TABLET    Take 1 tablet (75 mg total) by mouth once daily.    DEXLANSOPRAZOLE (DEXILANT) 60 MG CAPSULE    Take 1 capsule (60 mg total) by mouth once daily.    DULOXETINE (CYMBALTA) 30 MG CAPSULE    Take 1 capsule (30 mg total) by mouth once daily.    ESCITALOPRAM OXALATE (LEXAPRO) 20 MG TABLET    Take 1 tablet (20 mg total) by mouth once daily.    GEMFIBROZIL (LOPID) 600 MG TABLET    Take 1 tablet (600 mg total) by mouth once daily.    GI COCKTAIL (MYLANTA 30 ML, LIDOCAINE 2 % VISCOUS 10 ML, DICYCLOMINE 10 ML) 50 ML    Take 50 mLs by mouth 2 (two)  times daily.    HYDROCODONE-CHLORPHENIRAMINE (TUSSIONEX) 10-8 MG/5 ML SUSPENSION    Take 5 mLs by mouth every 12 (twelve) hours as needed for Cough or Congestion.    METFORMIN (GLUCOPHAGE) 500 MG TABLET    Take 1 tablet (500 mg total) by mouth 2 (two) times daily.    METOPROLOL TARTRATE (LOPRESSOR) 100 MG TABLET    Take 1 tablet (100 mg total) by mouth 2 (two) times daily.    ONDANSETRON (ZOFRAN) 4 MG TABLET    Take 1 tablet (4 mg total) by mouth every 6 (six) hours.    OXYCODONE-ACETAMINOPHEN (PERCOCET) 5-325 MG PER TABLET    Take 1 tablet by mouth every 6 (six) hours as needed for Pain.    PREDNISONE (DELTASONE) 10 MG TABLET    40 mg PO QD X 3 days then 30 mg PO QD x 3 days then 20 mg PO QD X 3 days then 10 mg PO QD X 3 days then 5 mg PO QD x 3 days then stop.    PROMETHAZINE (PHENERGAN) 6.25 MG/5 ML SYRUP    Take 5 mLs (6.25 mg total) by mouth every 6 (six) hours as needed (cough).    PROMETHAZINE-DEXTROMETHORPHAN (PROMETHAZINE-DM) 6.25-15 MG/5 ML SYRP    Take 5 mLs by mouth every 4 (four) hours as needed.    RANOLAZINE (RANEXA) 1,000 MG TB12    Take 1 tablet (1,000 mg total) by mouth 2 (two) times daily.    TEMAZEPAM (RESTORIL) 15 MG CAP    Take 1 capsule (15 mg total) by mouth every evening.    TIOTROPIUM (SPIRIVA) 18 MCG INHALATION CAPSULE    Inhale 1 capsule (18 mcg total) into the lungs once daily.    TOLTERODINE (DETROL) 2 MG TAB    Take 1 tablet (2 mg total) by mouth once daily. Take 4 mg by mouth once daily.    VALSARTAN (DIOVAN) 320 MG TABLET    Take 1 tablet (320 mg total) by mouth once daily.    VENLAFAXINE (EFFEXOR-XR) 37.5 MG 24 HR CAPSULE    Take 1 capsule (37.5 mg total) by mouth once daily.   Modified Medications    No medications on file   Discontinued Medications    No medications on file       Review of Systems   Constitutional: Negative.    HENT: Negative.    Eyes: Negative.    Respiratory: Negative.    Cardiovascular: Positive for chest pain.   Gastrointestinal: Negative.    Genitourinary:  Negative.    Musculoskeletal: Negative.    Skin: Negative.    Neurological: Negative.    Endo/Heme/Allergies: Negative.    Psychiatric/Behavioral: Negative.    All 12 systems otherwise negative.      Wt Readings from Last 3 Encounters:   09/12/19 62.8 kg (138 lb 7.2 oz)   09/10/19 64.4 kg (141 lb 15.6 oz)   09/05/19 64.4 kg (141 lb 15.6 oz)     Temp Readings from Last 3 Encounters:   08/07/19 98.4 °F (36.9 °C)   07/31/19 98 °F (36.7 °C)   07/28/19 98 °F (36.7 °C) (Oral)     BP Readings from Last 3 Encounters:   09/12/19 (!) 170/70   09/10/19 (!) 159/86   09/05/19 (!) 142/70     Pulse Readings from Last 3 Encounters:   09/12/19 (!) 124   09/10/19 96   09/05/19 97       BP (!) 170/70 (BP Location: Right arm)   Pulse (!) 124   Wt 62.8 kg (138 lb 7.2 oz)   BMI 23.76 kg/m²     Objective:   Physical Exam   Constitutional: She is oriented to person, place, and time. She appears well-developed and well-nourished. No distress.   HENT:   Head: Normocephalic and atraumatic.   Nose: Nose normal.   Mouth/Throat: Oropharynx is clear and moist.   Eyes: Conjunctivae and EOM are normal. No scleral icterus.   Neck: Normal range of motion. Neck supple. No JVD present. No thyromegaly present.   Cardiovascular: Normal rate, regular rhythm, S1 normal and S2 normal. Exam reveals no gallop, no S3, no S4 and no friction rub.   No murmur heard.  Pulmonary/Chest: Effort normal and breath sounds normal. No stridor. No respiratory distress. She has no wheezes. She has no rales. She exhibits no tenderness.   Abdominal: Soft. Bowel sounds are normal. She exhibits no distension and no mass. There is no tenderness. There is no rebound.   Genitourinary:   Genitourinary Comments: Deferred   Musculoskeletal: Normal range of motion. She exhibits no edema, tenderness or deformity.   Lymphadenopathy:     She has no cervical adenopathy.   Neurological: She is alert and oriented to person, place, and time. She exhibits normal muscle tone. Coordination  normal.   Skin: Skin is warm and dry. No rash noted. She is not diaphoretic. No erythema. No pallor.   Psychiatric: She has a normal mood and affect. Her behavior is normal. Judgment and thought content normal.   Nursing note and vitals reviewed.      Lab Results   Component Value Date     08/05/2019    K 3.9 08/05/2019     (H) 08/05/2019    CO2 17 (L) 08/05/2019    BUN 31 (H) 08/05/2019    CREATININE 1.3 08/05/2019    GLU 59 (L) 08/05/2019    HGBA1C 5.0 06/04/2019    AST 14 08/04/2019    ALT 13 08/04/2019    ALBUMIN 2.9 (L) 08/04/2019    PROT 6.7 08/04/2019    BILITOT 0.3 08/04/2019    WBC 8.22 08/06/2019    HGB 11.2 (L) 08/06/2019    HCT 33.9 (L) 08/06/2019    MCV 93 08/06/2019     08/06/2019    CHOL 187 06/04/2019    HDL 53 06/04/2019    LDLCALC 97.6 06/04/2019    TRIG 182 (H) 06/04/2019    BNP 89 07/26/2019     Assessment:      1. CAD in native artery    2. Essential hypertension    3. Coronary artery disease of native artery of native heart with stable angina pectoris    4. Congestive heart failure, unspecified HF chronicity, unspecified heart failure type    5. Cerebrovascular accident (CVA), unspecified mechanism    6. Focal seizures    7. Other chest pain    8. Dyspnea on exertion    9. Palpitations        Plan:   1. CAD with angina   - cont plavix, BB, Ranexa  - r/o ischemia, pharm nuclear stress test - pt cannot walk on treadmill    2. HTN - elevated today  - rec low salt diet  - cont meds  - add DIlt    3. CHF  - stable  - recent echo normal    4. CVA/Seizures  - cont meds per neuro    5. Palpitation with tachycardia  - add Dilt, check Holter    Thank you for allowing me to participate in this patient's care. Please do not hesitate to contact me with any questions or concerns. Consult note has been forwarded to the referral physician.

## 2019-09-12 NOTE — PROGRESS NOTES
Chief Pain Complaint:  Low back pain  Right shoulder pain    Interval History: Patient was seen on 8/4/19. At that time she underwent bilateral L3/4 TF XANDER.  The patient reports that she is/was better following the procedure.  she reports 75% pain relief.  She had a fall, then pain increased. She is currently living at [facility]. She is doing at home therapy there, which is helping. She is in wheelchair now but hopes to transition to walker soon.  After the fall, she was not able to get out of the bed.     Initial History of Present Illness:   This patient is a 73 y.o. female who presents today complaining of the above noted pain/s. The patient describes the pain as follows.  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  She reports approximately 2 years ago she had to sudden deaths in the family which caused very stressful time for her and resulted in shingles rash in the left V1 distribution however she reports having excruciating pain in the left V1 and V2 distributions.  She denies having difficulty with eating food and brushing her teeth on the left side of her face however the left lateral side of her nose gets her excruciating pain.  She has been tried on numerous medications in the past including gabapentin, Lyrica, Valtrex, Celebrex, ibuprofen which have all provided minimal benefit however steroids have been most helpful.  Today she rates her pain as an 8/10 and describes a constant burning sensation the left side of her face in addition to radiation into her bilateral lower extremities, her right shoulder, her left upper extremity occasionally as a pins and needle sensation.  She does report having numbness and weakness in her left lower extremity.  She has been physical therapy which she completed in February of 2019 however this caused most of her low back and leg  symptoms to worsen in addition to her post herpetic neuralgia to worsen.  She denies having bowel bladder difficulties.  Her symptoms are worse with activity such as walking in her somewhat improved with rest however she had finds it difficult to get into a comfortable position. She has been using topical lidocaine patches and applying to the left side of her face which does provide significant benefit.  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    Previous Therapy:  Medications:  Celebrex, ibuprofen, gabapentin, Lyrica, Valtrex, steroids  Injections: bilateral L3/4 TF XANDER on 8/4/19 with 75% pain relief  Surgeries: None  Physical Therapy: Completed in the Past    Past Surgical History:   Procedure Laterality Date    Bilateral L3/4 Transforaminal Epidural Steroid Injection Bilateral 7/23/2019    Performed by Desean Dias MD at [facility]    BRAIN SURGERY      CARDIAC CATHETERIZATION      CORONARY ANGIOPLASTY      HYSTERECTOMY         Imaging / Labs / Studies (reviewed on 9/12/2019):    Results for orders placed during the hospital encounter of 08/04/19   CT Lumbar Spine Without Contrast    Narrative FINDINGS:  Wedge compression deformity of T12 consistent with an old fracture.  No bowing of the posterior cortex.Gas formation within the disc at the L2-3 L3-4 L4-5 and L5-S1 levels.Bilateral facet arthropathy and neural foraminal narrowing seen most significantly at the L3-4 L4-5 and L5-S1 levels with possible bilateral L3-L4 and L5 nerve root impingement in the neural foramina.  13 degree lumbar scoliotic curvature convex to the left side.    Impression Mild lumbar scoliotic curvature convex to the left side with multilevel disc space narrowing seen most significantly at the L3-4 L4-5 and L5-S1 levels.  Bony neural foraminal narrowing with possible nerve root impingement  seen most significantly at the L3-L4 level on the right side with possible right-sided L3 nerve root impingement and on the left side at the L4-5 and L5-S1 levels with possible left-sided L4 and L5 nerve root impingement.  Old compression fracture of the T12 vertebral body.  Please see the prior myelogram dated 07/12/2019 showing almost complete block at the L3-L4 level.  All CT scans at this facility are performed  using dose modulation techniques as appropriate to performed exam including the following:  automated exposure control; adjustment of mA and/or kV according to the patients size (this includes techniques or standardized protocols for targeted exams where dose is matched to indication/reason for exam: i.e. extremities or head);  iterative reconstruction technique.     Results for orders placed during the hospital encounter of 08/04/19   CT Thoracic Spine Without Contrast    Narrative COMPARISON:  None  FINDINGS:  Normal vertebral alignment.  Multilevel degenerative changes of the vertebral endplates seen most significantly at the T7-T8-T8-T9 T9-T10 T10-T11 and the T11-T12 vertebra.  No evidence of an acute compression fracture can be seen.  Cannot exclude an old mild wedge compression deformity of the T12 vertebral body.  Multilevel facet arthropathy.  Multilevel disc space narrowings between T4 and T10. Atherosclerotic calcifications of the abdominal aorta without aneurysmal dilatation.  Multiple subpleural blebs present in the adjacent lungs.    Impression Multilevel degenerative changes as described above.  Mild wedge deformity of the T12 vertebral body consistent with an old compression fracture.  No acute fracture identified.  The patient has had a prior myelogram dated 07/12/2019 showing multiple impingement on the ventral thecal sac secondary to disc disease.  T12 vertebra is similar on the prior study.  All CT scans at this facility are performed  using dose modulation techniques as appropriate to  performed exam including the following:  automated exposure control; adjustment of mA and/or kV according to the patients size (this includes techniques or standardized protocols for targeted exams where dose is matched to indication/reason for exam: i.e. extremities or head);  iterative reconstruction technique.       Results for orders placed during the hospital encounter of 07/12/19   X-Ray Hips Bilateral 2 View Incl AP Pelvis    Narrative FINDINGS:  Degenerative changes of the lumbosacral spine and sacroiliac joints.  Bones are demineralized.  Bilateral total hip arthroplasty changes are noted.  No periprosthetic fracture or hardware complication.     Results for orders placed during the hospital encounter of 08/04/19   X-Ray Hip 2 View Left    Narrative COMPARISON:  07/31/2019  FINDINGS:  Bilateral hip replacements are intact in good alignment.  Left hip appears free of any acute fracture or dislocation.     Results for orders placed during the hospital encounter of 07/31/19   CT Cervical Spine Without Contrast    Narrative FINDINGS:  Osteopenia.  Grade 2 degenerative spondylolisthesis at C3-C4.  Grade 1 degenerative spondylolisthesis at C4-C5.  Vertebral body height is normal.  No acute fractures. No prevertebral soft tissue swelling. Atlanto-occipital articulation is normal.  Congenitally patulous spinal canal.  No significant spinal stenosis.  Moderate left foraminal stenosis at C3-C4.    Impression No acute findings.  Degenerative spondylolisthesis at C3-C4 and C4-C5.  All CT scans at this facility use dose modulation, iterative reconstruction, and/or weight base dosing when appropriate to reduce radiation dose to as low as reasonably achievable.     Results for orders placed during the hospital encounter of 07/12/19   Fl Myelogram 2 Or More Regions    Narrative TECHNIQUE:  After obtaining informed consent from the patient explain the risks, benefits and alternatives to the procedure the patient was placed  on the fluoroscopic table in the prone position. The risks included although not limited to bleeding, infection, contrast/drug reaction, nerve root/cord injury,  CSF leak/spinal headache, seizures and herniation. The L2/3 level within isolated under fluoroscopy. The overlying skin was prepped and draped routine sterile fashion. Approximately 1 cc of a 1% lidocaine was used for local anesthesia. Using a 22-gauge spinal needle access was obtained to the thecal sac be a midline translaminar approach. Following removal of stylet spontaneous visualization of clear cerebrospinal fluid was identified. Following this contrast system was attached and approximately 10 cc of a Omnipaque 300 contrast material was infused under fluoroscopy. Patient tolerated procedure well. No immediate postprocedure complication. The stylet was then reinserted and the  spinal needle system was removed in full. Adhesive bandage was placed over the puncture site. Spot overhead fluoroscopic images in the and lateral projection with additional lateral flexion and extension views.  Total fluoro time was 2.1 minutes and 1 fluoroscopic image was obtained.  COMPARISON:  06/04/2019  FINDINGS:  No complications.  Limited images due to limited mobility.    Impression Successful fluoroscopic myelogram. See dedicated CT myelogram thoracic and lumbar spine.     Results for orders placed during the hospital encounter of 07/12/19   CT Myelography Thoracic Lumbar Spine    Narrative COMPARISON:  None  FINDINGS:  Thoracic spine:  There is no acute fracture of the thoracic spine.  There is an old anterior wedge compression fracture of the T12 vertebral body.  There is a Schmorl's node seen within the superior aspect of the T8 vertebral body.  There is a slight levoscoliosis of the thoracic spine centered at T3.  T1-T2: Unremarkable.  T2-T3: Unremarkable.  T3-T4: There is a disc bulge with left ventral surface thecal sac effacement but no impingement upon the  cord.  T4-T5: Unremarkable.  T5-T6: Unremarkable.  T6-T7: There is a central focal disc protrusion which causes right ventral surface cord impingement.  The disc protrusion measures 7 mm in AP dimension, 14 mm in craniocaudal dimension, and 10 mm in transverse dimension.  T7-T8: Unremarkable.  T8-T9: There is a central disc extrusion with the disc extrusion extending inferiorly from the level of the disc and measuring 19 mm in craniocaudal dimension, 8 mm in transverse dimension, and 5 mm in anterior-posterior dimension.  This disc extrusion slightly impinges upon the ventral surface of the cord.  T9-T10: Unremarkable.  T10-T11: There is a disc extrusion in the central to right central location which is extending superiorly from the disc level and measuring 18 mm in craniocaudal dimension, 5 mm in anterior-posterior dimension, and 8 mm in transverse dimension.  This disc extrusion impinges upon the right ventral surface of the cord.  T11-T12: Minimal disc bulge with ventral surface thecal sac effacement but no cord impingement.  T12-L1: Right central disc protrusion measuring 10 mm in craniocaudal dimension, 8 mm in transverse dimension, and 5 mm in anterior-posterior dimension which causes slight right ventral surface cord remodeling.    Lumbar spine:  No fracture of the lumbar spine.  No paraspinal mass.  The conus medullaris has a normal morphology and terminates at the L1-L2 level.  L1-L2: Mild disc bulge with mild ventral surface thecal sac effacement.  Patent neural foramina.  L2-L3: Degenerative disc disease.  Disc bulge with severe central canal stenosis with the AP diameter at the point of maximal stenosis measuring 7 mm.  Mild bilateral neural foraminal narrowing.  Bilateral facet joint osteoarthritis.  L3-L4: Severe degenerative disc disease.  There is critical central canal stenosis at this level with no contrast-enhanced CSF identified at the disc level.  There is severe crowding of the nerve roots in  the compressed thecal sac at this level.  The disc protrusion extends into the right neural foramen causing severe right neural foraminal stenosis and probable impingement upon the exiting right L3 nerve root.  L4-5: Severe degenerative disc disease.  Disc bulge along with ligamentum flavum thickening which causes severe central canal stenosis with the AP diameter of the thecal sac measuring 8 mm the point of maximal stenosis.  The disc protrusion extends into the bilateral neural foramen causing severe right neural foraminal stenosis and moderate left neural foraminal stenosis.  L5-S1: Degenerative disc disease.  Disc bulge with mild central canal narrowing.  Mild to moderate left neural foraminal stenosis.    Impression 1. There is critical central canal stenosis at L3-L4 due to a disc bulge and ligamentum flavum thickening.  2. Severe right L3-L4 neural foraminal stenosis with probable impingement upon the exiting right L3 nerve root.  3. Severe central canal stenosis at L2-L3 and L4-L5 due to disc bulges.  4. Severe right L4-5 neural foraminal stenosis.  5. Multiple disc protrusions in the thoracic spine as detailed each level with some ventral surface cord impingement seen at the T6-T7, T8-T9, and T10-T11 levels.  All CT scans at this facility are performed  using dose modulation techniques as appropriate to performed exam including the following:  automated exposure control; adjustment of mA and/or kV according to the patients size (this includes techniques or standardized protocols for targeted exams where dose is matched to indication/reason for exam: i.e. extremities or head);  iterative reconstruction technique.         Review of Systems:  Review of Systems   Constitutional: Negative for fever.   HENT:        Left-sided rash of the face   Eyes: Negative for blurred vision.   Respiratory: Negative for cough and wheezing.    Cardiovascular: Negative for chest pain and orthopnea.   Gastrointestinal: Negative  "for constipation, diarrhea, nausea and vomiting.   Genitourinary: Negative for dysuria.   Musculoskeletal: Positive for back pain.   Skin: Negative for itching and rash.   Neurological: Positive for weakness.   Endo/Heme/Allergies: Does not bruise/bleed easily.       Physical Exam:  Vitals:  BP (!) 144/84 (BP Location: Right arm, Patient Position: Sitting, BP Method: Medium (Automatic))   Pulse 101   Resp 18   Ht 5' 4" (1.626 m)   Wt 62.6 kg (138 lb)   BMI 23.69 kg/m²   (reviewed on 9/12/2019)    General: alert and oriented, in no apparent distress.  Gait: in wheelchair.  Skin: no rashes, no discoloration, no obvious lesions  HEENT: normocephalic, atraumatic. Pupils equal and round.  Cardiovascular: no significant peripheral edema present.  Respiratory: without use of accessory muscles of respiration.    Musculoskeletal - Lumbar Spine:  - Pain on flexion of lumbar spine: Present  - Pain on extension of lumbar spine: Present  - Lumbar facet loading: Absent   - TTP over the lumbar facet joints: Present  - TTP over the lumbar paraspinals: Present  - TTP over the SI joints:  Absent   - TTP over PSIS: Absent   - Straight Leg Raise: Negative    Right Shoulder:  - Pain on abduction: Present   - ROM: decreased secondary to pain    - TTP over the AC and GH joint: Present  - Neer's: Positive  - Hawkin's: Positive    Neuro - Lower Extremities:  - RLE Strength:     >> 5/5 strength with right hip flexion/ extension    >> 5/5 strength with right knee flexion/ extension    >> 5/5 strength in right ankle with plantar and dorsiflexion  - LLE Strength:     >> 5/5 strength with left hip flexion/ extension    >> 5/5 strength with knee flexion extension on the left     >> 5/5 strength in left ankle with plantar and dorsiflexion  - Extremity Reflexes: Brisk and symmetric throughout  - Sensory: Sensation to light touch intact bilaterally      Psych:  Mood and affect is appropriate          Assessment  1. 73 y.o. year old patient with " PMH of   Past Medical History:   Diagnosis Date    Abnormal ECG 8/5/2019    Aneurysm     Anticoagulant long-term use     Arthritis     CAD in native artery 8/5/2019    COPD (chronic obstructive pulmonary disease)     Diabetes mellitus     Glaucoma     Hypertension     Seizures     Shingles 05/27/2017    Stroke       presenting with pain located left side of her face, left lower extremity, lumbar spine. Diagnoses include:    ICD-10-CM ICD-9-CM   1. Lumbar radiculopathy M54.16 724.4   2. Hip pain, bilateral M25.551 719.45    M25.552    3. Spondylosis of cervical region without myelopathy or radiculopathy M47.812 721.0   4. Lumbar spondylosis M47.816 721.3   5. Postherpetic neuralgia B02.29 053.19      2. Pain Generators / Etiology: Lumbar Radiculopathy and Lumbar Spondylosis, post herpetic neuralgia  3. Failed Meds (E- Effective, NE- Not Effective):  Celebrex-minimally effective, ibuprofen-minimally effective, gabapentin-minimally effective, Lyrica-minimally effective, Valtrex-minimally effective, steroid-effective  4. Physical Therapy - Completed in the Past  5. Psychological comorbidities - None  6. Anticoagulants / Antiplatelets: Plavix      Plan:  1. Interventional: S/p bilateral L3/4 TF XANDER on 8/4/19 with 75% pain relief.    2. Pharmacologic: Continue Cymbalta 30 mg once daily PRN.     3. Rehabilitative: Encouraged regular exercise.    4. Diagnostic: radiology reviewed, detailed above.     5. Follow up: 4 weeks f/u to consider repeat injection.     - This condition does not require this patient to take time off of work.   - I discussed the risks, benefits, and alternatives to potential treatment options. All questions and concerns were fully addressed today in clinic. Dr. Dias was consulted regarding the patient plan and agrees.

## 2019-09-13 ENCOUNTER — CLINICAL SUPPORT (OUTPATIENT)
Dept: CARDIOLOGY | Facility: CLINIC | Age: 74
End: 2019-09-13
Attending: INTERNAL MEDICINE
Payer: MEDICARE

## 2019-09-13 DIAGNOSIS — R07.89 OTHER CHEST PAIN: ICD-10-CM

## 2019-09-13 DIAGNOSIS — R06.09 DYSPNEA ON EXERTION: ICD-10-CM

## 2019-09-13 DIAGNOSIS — R00.2 PALPITATIONS: ICD-10-CM

## 2019-09-13 PROCEDURE — 93226 XTRNL ECG REC<48 HR SCAN A/R: CPT | Mod: PBBFAC | Performed by: INTERNAL MEDICINE

## 2019-09-13 PROCEDURE — 93227 HOLTER MONITOR - 48 HOUR: ICD-10-PCS | Mod: S$PBB,,, | Performed by: INTERNAL MEDICINE

## 2019-09-13 PROCEDURE — 93227 XTRNL ECG REC<48 HR R&I: CPT | Mod: S$PBB,,, | Performed by: INTERNAL MEDICINE

## 2019-09-16 ENCOUNTER — TELEPHONE (OUTPATIENT)
Dept: CARDIOLOGY | Facility: CLINIC | Age: 74
End: 2019-09-16

## 2019-09-16 NOTE — TELEPHONE ENCOUNTER
----- Message from Archana Gibson sent at 9/16/2019  2:08 PM CDT -----  Contact: Patient's brother- Rm Ojeda   Patient's brother would like to speak to someone about his sister,someone was suppose to  patient's monitor on yesterday and no one did, please call at  [phone]

## 2019-09-16 NOTE — TELEPHONE ENCOUNTER
----- Message from Delroy Ellis sent at 9/16/2019  4:08 PM CDT -----  Contact: pt   Pt states no one came and picked up her heart monitor. Pt would like .         [phone]

## 2019-09-16 NOTE — TELEPHONE ENCOUNTER
Returned call to patient's brother Rm--left message to call our office back at [phone]--need to inform patient that [facility] EKG tech will  monitor today

## 2019-09-17 NOTE — PROGRESS NOTES
Subjective:      Patient ID: Shireen Felix is a 73 y.o. female.    Chief Complaint: Follow-up (Spinal stenosis of lumbar region with neurogenic claudication)    Patient is here today for follow-up  Status post CT myelogram  She has been placed in a nursing home since her last visit  She continues to have back pain going down the lower extremities with numbness and tingling  Her symptoms are much better than they were prior to her initial evaluation  She uses a wheelchair for instances when she has long periods of mobility  She is able to use a rolling walker with therapy which she does on a daily basis      CT myelogram results  Bony neural foraminal narrowing with possible nerve root impingement seen most significantly at the L3-L4 level on the right side with possible right-sided L3 nerve root impingement and on the left side at the L4-5 and L5-S1 levels with possible left-sided L4 and L5 nerve root impingement.  Old compression fracture of the T12 vertebral body.  Please see the prior myelogram dated 07/12/2019 showing almost complete block at the L3-L4 level.    Follow-up   Associated symptoms include weakness. Pertinent negatives include no abdominal pain, chest pain, chills, headaches or sore throat.     Review of Systems   Constitutional: Negative for activity change, appetite change and chills.   HENT: Negative for hearing loss, sore throat and tinnitus.    Eyes: Negative for pain, discharge and itching.   Cardiovascular: Negative for chest pain.   Gastrointestinal: Negative for abdominal pain.   Endocrine: Negative for cold intolerance and heat intolerance.   Genitourinary: Negative for difficulty urinating and dysuria.   Musculoskeletal: Positive for back pain and gait problem.   Allergic/Immunologic: Negative for environmental allergies.   Neurological: Positive for weakness. Negative for dizziness, tremors, light-headedness and headaches.   Hematological: Negative for adenopathy.    Psychiatric/Behavioral: Negative for agitation, behavioral problems and confusion.         Objective:         Nursing note and vitals reviewed  Gen:Oriented to person, place, and time.             Appears stated age   Skin: no rashes or lesions identified   Head:Normocephalic and atraumatic.  Nose: Nose normal.    Eyes: EOM are normal. Pupils are equal, round, and reactive to light.   Neck: Neck supple. No masses or lesions palpated  Cardiovascular: Intact distal pulses.    Abdominal: Soft.   Neurological: Alert and oriented to person, place, and time.  No cranial nerve deficit.  Coordination normal. Normal muscle tone  Psychiatric: Normal mood and affect. Behavior is normal.      Back:       None  Paraspinal muscle spasms   None  Pain with flexion and extention   WNL  Range of motion    Neg  Straight leg raise     Motor   Right Right Left Left  Level Group   5  5  L2 Hip flexor (Psoas)   5  5  L3 Leg extension (Quads)   5   4+ L4 Dorsiflexion & foot inversion (Tibialis Anterior)   5   4+ L5 Great toe extension ( EHL)   5   4+ S1 Foot eversion (Gastroc, PL & PB)     Sensation  NL Decreased (R/L/BL) Level Sensation    X  L2 Anterio-medial thigh   X  L3 Medial thigh around knee   X  L4 Medial foot    Diminished to light touch on the left L5 Dorsum foot   X  S1 Lateral foot     Reflex  2+  Patellar tendon (L4)   2+  Achilles tendon (S1)       CT scan lumbar spine    At the L2-3 level, there is a diffuse disc bulge ligamentum flavum hypertrophy and mild bilateral facet arthropathy resulting in moderate narrowing of the central canal.  The bilateral neural foraminal canals are mildly narrowed right greater than the left.    At the L3-4 level, there is grade 1 spondylolisthesis along with a diffuse disc bulge mild-to-moderate bilateral facet arthropathy and ligamentum flavum hypertrophy resulting in severe narrowing of the central canal.  The lateral recesses are significantly narrowed bilaterally.  The right neural  foraminal canal is severely narrowed with effacement of the L3 nerve root by disc material.  The left neural foraminal canal is mildly to moderately narrowed.    At the L4-5 level, there is a diffuse disc bulge and mild-to-moderate bilateral facet arthropathy left greater than the right and ligamentum flavum hypertrophy resulting in severe narrowing of the central canal and significant narrowing of either lateral recess.  The right neural foraminal canal is moderately narrowed.  The left neural foraminal canal is moderately to severely narrowed.    At the L5-S1 level, there is a diffuse disc bulge ligamentum flavum hypertrophy and mild bilateral facet arthropathy resulting in moderate narrowing of the central canal and moderate to severe narrowing of the left neural foraminal canal.  The right neural foraminal canal is mildly narrowed.  Assessment:     1. Degenerative disc disease, lumbar    2. Lumbar stenosis with neurogenic claudication    3. Spondylolisthesis, lumbar region    4. Lumbar radiculopathy      Plan:     Degenerative disc disease, lumbar    Lumbar stenosis with neurogenic claudication    Spondylolisthesis, lumbar region    Lumbar radiculopathy     I have gone over the patient's imaging  Based on her symptoms she would need a posterior decompression with likely spinal fusion at the level of the spondylolisthesis.  She states that overall she is doing better than she was the last time I had seen her.  She resides in a nursing home.  She is very hesitant to pursue any sort of procedure at this time.  I have told her if she changes her mind a wish to have another evaluation for this please to come back and see me.  Otherwise she can't continue with pain management if she does not wish to consider surgical intervention.    Thank you for the referral   Please call with any questions    Everardo Olson MD  Neurosurgery

## 2019-09-18 ENCOUNTER — TELEPHONE (OUTPATIENT)
Dept: CARDIOLOGY | Facility: CLINIC | Age: 74
End: 2019-09-18

## 2019-09-18 NOTE — TELEPHONE ENCOUNTER
----- Message from Jarrell Dye MD sent at 9/18/2019 12:17 PM CDT -----  Please call patient regarding normal result of heart monitor with rare extra beats. If he/she has any questions please let me know or have him/her schedule an appt to see me soon to discuss. Thank you

## 2019-09-19 ENCOUNTER — TELEPHONE (OUTPATIENT)
Dept: OTOLARYNGOLOGY | Facility: CLINIC | Age: 74
End: 2019-09-19

## 2019-09-19 NOTE — TELEPHONE ENCOUNTER
"Thought "Mary" (Dr. Santana) left a message to call her about results.  I didn't see where Dr. Santana had called.  I explained that I do see where "Jessica" with Dr. Dye's office had called and left a message on yesterday.  They said that must be who they are trying to reach.  They will call Dr. Dye's office.  "

## 2019-09-19 NOTE — TELEPHONE ENCOUNTER
----- Message from Rusty Munguia sent at 9/19/2019  8:16 AM CDT -----  Contact: Pt  .Type:  Patient Returning Call    Who Called: Pt   Who Left Message for Patient: Jessica   Does the patient know what this is regarding?: return call   Would the patient rather a call back or a response via [facility]? Call back   Best Call Back Number: [phone]  Additional Information:

## 2019-09-19 NOTE — TELEPHONE ENCOUNTER
----- Message from Mariama Hayes sent at 9/19/2019  4:23 PM CDT -----  Contact: self  Type:  Patient Returning Call    Who Called:Shireen  Who Left Message for Patient:  Does the patient know what this is regarding?:  Would the patient rather a call back or a response via [facility]? call  Best Call Back Number:[phone]  Additional Information:

## 2019-09-19 NOTE — TELEPHONE ENCOUNTER
----- Message from Laila Lackey sent at 9/19/2019  1:49 PM CDT -----  Contact: patient  Type:  Patient Returning Call    Who Called:patient  Who Left Message for Patient:nurse  Does the patient know what this is regarding?: testing  Would the patient rather a call back or a response via [facility]? call  Best Call Back Number:[phone]  Additional Information: please call patient back today ASAP. Thanks, tami

## 2019-09-19 NOTE — TELEPHONE ENCOUNTER
Called to patient and gave results of holter monitor--all questions answered--patient verbalizes understanding of all information

## 2019-09-23 ENCOUNTER — EXTERNAL HOME HEALTH (OUTPATIENT)
Dept: HOME HEALTH SERVICES | Facility: HOSPITAL | Age: 74
End: 2019-09-23
Payer: MEDICAID

## 2019-09-25 ENCOUNTER — HOSPITAL ENCOUNTER (OUTPATIENT)
Dept: PULMONOLOGY | Facility: HOSPITAL | Age: 74
Discharge: HOME | End: 2019-09-25
Attending: INTERNAL MEDICINE
Payer: MEDICARE

## 2019-09-25 ENCOUNTER — HOSPITAL ENCOUNTER (OUTPATIENT)
Dept: RADIOLOGY | Facility: HOSPITAL | Age: 74
Discharge: HOME | End: 2019-09-25
Attending: INTERNAL MEDICINE
Payer: COMMERCIAL

## 2019-09-25 DIAGNOSIS — R07.89 OTHER CHEST PAIN: ICD-10-CM

## 2019-09-25 DIAGNOSIS — R06.09 DYSPNEA ON EXERTION: ICD-10-CM

## 2019-09-25 PROCEDURE — 93018 CV STRESS TEST I&R ONLY: CPT | Mod: ,,, | Performed by: INTERNAL MEDICINE

## 2019-09-25 PROCEDURE — 78452 HT MUSCLE IMAGE SPECT MULT: CPT | Mod: 26,,, | Performed by: INTERNAL MEDICINE

## 2019-09-25 PROCEDURE — 93017 CV STRESS TEST TRACING ONLY: CPT

## 2019-09-25 PROCEDURE — A9502 TC99M TETROFOSMIN: HCPCS

## 2019-09-25 PROCEDURE — 93016 CV STRESS TEST SUPVJ ONLY: CPT | Mod: ,,, | Performed by: INTERNAL MEDICINE

## 2019-09-25 PROCEDURE — 93018 NM MULTI PHARM STRESS CARDIAC COMPONENT: ICD-10-PCS | Mod: ,,, | Performed by: INTERNAL MEDICINE

## 2019-09-25 PROCEDURE — 63600175 PHARM REV CODE 636 W HCPCS: Performed by: NURSE PRACTITIONER

## 2019-09-25 PROCEDURE — 78452 NM MULTI PHARM STRESS CARDIAC COMPONENT: ICD-10-PCS | Mod: 26,,, | Performed by: INTERNAL MEDICINE

## 2019-09-25 PROCEDURE — 93016 NM MULTI PHARM STRESS CARDIAC COMPONENT: ICD-10-PCS | Mod: ,,, | Performed by: INTERNAL MEDICINE

## 2019-09-25 RX ORDER — REGADENOSON 0.08 MG/ML
0.4 INJECTION, SOLUTION INTRAVENOUS ONCE
Status: COMPLETED | OUTPATIENT
Start: 2019-09-25 | End: 2019-09-25

## 2019-09-25 RX ADMIN — REGADENOSON 0.4 MG: 0.08 INJECTION, SOLUTION INTRAVENOUS at 12:09

## 2019-09-26 ENCOUNTER — TELEPHONE (OUTPATIENT)
Dept: CARDIOLOGY | Facility: CLINIC | Age: 74
End: 2019-09-26

## 2019-09-26 LAB — DIASTOLIC DYSFUNCTION: NO

## 2019-09-26 NOTE — TELEPHONE ENCOUNTER
----- Message from Jarrell Dye MD sent at 9/26/2019  1:49 PM CDT -----  Please call patient regarding normal result of nuclear stress test. If he/she has any questions please let me know or have him/her schedule an appt to see me soon to discuss. Thank you

## 2019-09-26 NOTE — TELEPHONE ENCOUNTER
----- Message from Archana Gibson sent at 9/26/2019  3:36 PM CDT -----  Contact: Patient  .Type:  Patient Returning Call    Who Called:Patient  Who Left Message for Patient:ANA MARIA  Does the patient know what this is regarding?:  Would the patient rather a call back or a response via [facility]? Call  Best Call Back Number:.[phone]    Additional Information:

## 2019-09-26 NOTE — TELEPHONE ENCOUNTER
Patient contacted. Left message to return call.to be advised about;     normal result of nuclear stress test. If he/she has any questions please let me know or have him/her schedule an appt to see me soon to discuss. Thank you

## 2019-09-26 NOTE — TELEPHONE ENCOUNTER
Patient contacted and advised that her normal result of nuclear stress test. Patient states understanding with no concerns. Patient keep current appointment for 10/15/2019

## 2019-09-27 ENCOUNTER — TELEPHONE (OUTPATIENT)
Dept: INTERNAL MEDICINE | Facility: CLINIC | Age: 74
End: 2019-09-27

## 2019-09-27 ENCOUNTER — TELEPHONE (OUTPATIENT)
Dept: PAIN MEDICINE | Facility: CLINIC | Age: 74
End: 2019-09-27

## 2019-09-27 NOTE — TELEPHONE ENCOUNTER
----- Message from Vonda Harris sent at 9/27/2019  8:16 AM CDT -----  Contact: Rm Ojeda   States he needs to speak to the nurse regarding some paper work. Please call Rm Ojeda at [phone]. Thank you

## 2019-09-27 NOTE — TELEPHONE ENCOUNTER
Pt and I was speaking and we got disconnected . Left pt a voicemail to inform him of the information .

## 2019-09-27 NOTE — TELEPHONE ENCOUNTER
----- Message from Vonda Harris sent at 9/27/2019  8:13 AM CDT -----  Contact: Rm Ojeda  States he needs to speak to the nurse regarding some paper work. Please call Rm Ojeda at [phone]. Thank you

## 2019-09-27 NOTE — TELEPHONE ENCOUNTER
----- Message from Markel Garland sent at 9/27/2019  1:29 PM CDT -----  Contact: serjio OjedaJeanine's Brother  Type:  Patient Returning Call    Who Called:Serjio OjedaJeanine's Brother  Who Left Message for Patient: hussain  Does the patient know what this is regarding?: Yes   Would the patient rather a call back or a response via [facility]? Call back   Best Call Back Number: [phone] ([place])   Additional Information: n/a

## 2019-09-27 NOTE — TELEPHONE ENCOUNTER
She should have a physician assigned to her in the nursing home; advise him to ask to speak to the nursing home's  about getting her paper work complete prior to her discharge home.

## 2019-09-27 NOTE — TELEPHONE ENCOUNTER
He has some disability papers that needs to be filled out for her to get a trailer with handicap accessories . She is in a nursing home and he not sure who suppose to fill them out .          Advise !

## 2019-10-30 ENCOUNTER — TELEPHONE (OUTPATIENT)
Dept: PAIN MEDICINE | Facility: CLINIC | Age: 74
End: 2019-10-30

## 2019-10-30 NOTE — TELEPHONE ENCOUNTER
----- Message from Mely Claudio sent at 10/30/2019 11:10 AM CDT -----  Contact: Brother- Mr OjedaHerseka-[phone]  Would like to consult with the nurse, mr Ojeda has some Question concerning the Patient Appt, Please call back at [phone], Thanks sj

## 2019-10-31 ENCOUNTER — HOSPITAL ENCOUNTER (OUTPATIENT)
Dept: RADIOLOGY | Facility: HOSPITAL | Age: 74
Discharge: HOME | End: 2019-10-31
Attending: PHYSICIAN ASSISTANT
Payer: MEDICARE

## 2019-10-31 ENCOUNTER — OFFICE VISIT (OUTPATIENT)
Dept: PAIN MEDICINE | Facility: CLINIC | Age: 74
End: 2019-10-31
Payer: MEDICARE

## 2019-10-31 ENCOUNTER — PATIENT OUTREACH (OUTPATIENT)
Dept: ADMINISTRATIVE | Facility: HOSPITAL | Age: 74
End: 2019-10-31

## 2019-10-31 ENCOUNTER — TELEPHONE (OUTPATIENT)
Dept: INTERNAL MEDICINE | Facility: CLINIC | Age: 74
End: 2019-10-31

## 2019-10-31 VITALS
WEIGHT: 132 LBS | RESPIRATION RATE: 18 BRPM | HEIGHT: 64 IN | SYSTOLIC BLOOD PRESSURE: 182 MMHG | DIASTOLIC BLOOD PRESSURE: 84 MMHG | BODY MASS INDEX: 22.53 KG/M2 | HEART RATE: 95 BPM

## 2019-10-31 DIAGNOSIS — M25.551 HIP PAIN, BILATERAL: ICD-10-CM

## 2019-10-31 DIAGNOSIS — M47.816 LUMBAR SPONDYLOSIS: ICD-10-CM

## 2019-10-31 DIAGNOSIS — M25.552 HIP PAIN, BILATERAL: ICD-10-CM

## 2019-10-31 DIAGNOSIS — M54.16 LUMBAR RADICULOPATHY: Primary | ICD-10-CM

## 2019-10-31 DIAGNOSIS — M47.812 SPONDYLOSIS OF CERVICAL REGION WITHOUT MYELOPATHY OR RADICULOPATHY: ICD-10-CM

## 2019-10-31 DIAGNOSIS — B02.29 POSTHERPETIC NEURALGIA: ICD-10-CM

## 2019-10-31 PROCEDURE — 72110 XR LUMBAR SPINE 5 VIEW WITH FLEX AND EXT: ICD-10-PCS | Mod: 26,,, | Performed by: RADIOLOGY

## 2019-10-31 PROCEDURE — 73521 XR HIPS BILATERAL 2 VIEW INCL AP PELVIS: ICD-10-PCS | Mod: 26,,, | Performed by: RADIOLOGY

## 2019-10-31 PROCEDURE — 72110 X-RAY EXAM L-2 SPINE 4/>VWS: CPT | Mod: 26,,, | Performed by: RADIOLOGY

## 2019-10-31 PROCEDURE — 99214 PR OFFICE/OUTPT VISIT, EST, LEVL IV, 30-39 MIN: ICD-10-PCS | Mod: S$PBB,,, | Performed by: PHYSICIAN ASSISTANT

## 2019-10-31 PROCEDURE — 99215 OFFICE O/P EST HI 40 MIN: CPT | Mod: PBBFAC,25 | Performed by: PHYSICIAN ASSISTANT

## 2019-10-31 PROCEDURE — 99214 OFFICE O/P EST MOD 30 MIN: CPT | Mod: S$PBB,,, | Performed by: PHYSICIAN ASSISTANT

## 2019-10-31 PROCEDURE — 72110 X-RAY EXAM L-2 SPINE 4/>VWS: CPT | Mod: TC

## 2019-10-31 PROCEDURE — 96372 THER/PROPH/DIAG INJ SC/IM: CPT | Mod: PBBFAC

## 2019-10-31 PROCEDURE — 99999 PR PBB SHADOW E&M-EST. PATIENT-LVL V: ICD-10-PCS | Mod: PBBFAC,,, | Performed by: PHYSICIAN ASSISTANT

## 2019-10-31 PROCEDURE — 73521 X-RAY EXAM HIPS BI 2 VIEWS: CPT | Mod: 26,,, | Performed by: RADIOLOGY

## 2019-10-31 PROCEDURE — 99999 PR PBB SHADOW E&M-EST. PATIENT-LVL V: CPT | Mod: PBBFAC,,, | Performed by: PHYSICIAN ASSISTANT

## 2019-10-31 PROCEDURE — 73521 X-RAY EXAM HIPS BI 2 VIEWS: CPT | Mod: TC

## 2019-10-31 RX ORDER — KETOROLAC TROMETHAMINE 30 MG/ML
30 INJECTION, SOLUTION INTRAMUSCULAR; INTRAVENOUS
Status: COMPLETED | OUTPATIENT
Start: 2019-10-31 | End: 2019-10-31

## 2019-10-31 RX ADMIN — KETOROLAC TROMETHAMINE 30 MG: 30 INJECTION, SOLUTION INTRAMUSCULAR; INTRAVENOUS at 11:10

## 2019-10-31 NOTE — PROGRESS NOTES
Chief Pain Complaint:  Low back pain  Right shoulder pain    Interval History: Patient was seen on 8/4/19. At that time she underwent bilateral L3/4 TF XANDER.  The patient reports that she is/was better following the procedure.  she reports 75% pain relief.  She had a fall, then pain increased. She is currently living at [facility]. She is doing at home therapy there, which is helping. She is in wheelchair now but hopes to transition to walker soon.  After the fall, she was not able to get out of the bed.     Initial History of Present Illness:   This patient is a 73 y.o. female who presents today complaining of the above noted pain/s. The patient describes the pain as follows.  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  She reports approximately 2 years ago she had to sudden deaths in the family which caused very stressful time for her and resulted in shingles rash in the left V1 distribution however she reports having excruciating pain in the left V1 and V2 distributions.  She denies having difficulty with eating food and brushing her teeth on the left side of her face however the left lateral side of her nose gets her excruciating pain.  She has been tried on numerous medications in the past including gabapentin, Lyrica, Valtrex, Celebrex, ibuprofen which have all provided minimal benefit however steroids have been most helpful.  Today she rates her pain as an 8/10 and describes a constant burning sensation the left side of her face in addition to radiation into her bilateral lower extremities, her right shoulder, her left upper extremity occasionally as a pins and needle sensation.  She does report having numbness and weakness in her left lower extremity.  She has been physical therapy which she completed in February of 2019 however this caused most of her low back and leg  symptoms to worsen in addition to her post herpetic neuralgia to worsen.  She denies having bowel bladder difficulties.  Her symptoms are worse with activity such as walking in her somewhat improved with rest however she had finds it difficult to get into a comfortable position. She has been using topical lidocaine patches and applying to the left side of her face which does provide significant benefit.  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    Previous Therapy:  Medications:  Celebrex, ibuprofen, gabapentin, Lyrica, Valtrex, steroids  Injections: bilateral L3/4 TF XANDER on 8/4/19 with 75% pain relief  Surgeries: None  Physical Therapy: Completed in the Past    Past Surgical History:   Procedure Laterality Date    BRAIN SURGERY      CARDIAC CATHETERIZATION      CORONARY ANGIOPLASTY      HYSTERECTOMY      TRANSFORAMINAL EPIDURAL INJECTION OF STEROID Bilateral 7/23/2019    Procedure: Bilateral L3/4 Transforaminal Epidural Steroid Injection;  Surgeon: Desean Dias MD;  Location: [facility];  Service: Pain Management;  Laterality: Bilateral;       Imaging / Labs / Studies (reviewed on 10/31/2019):    Results for orders placed during the hospital encounter of 08/04/19   CT Lumbar Spine Without Contrast    Narrative FINDINGS:  Wedge compression deformity of T12 consistent with an old fracture.  No bowing of the posterior cortex.Gas formation within the disc at the L2-3 L3-4 L4-5 and L5-S1 levels.Bilateral facet arthropathy and neural foraminal narrowing seen most significantly at the L3-4 L4-5 and L5-S1 levels with possible bilateral L3-L4 and L5 nerve root impingement in the neural foramina.  13 degree lumbar scoliotic curvature convex to the left side.    Impression Mild lumbar scoliotic curvature convex to the left side with multilevel disc space narrowing seen most  significantly at the L3-4 L4-5 and L5-S1 levels.  Bony neural foraminal narrowing with possible nerve root impingement seen most significantly at the L3-L4 level on the right side with possible right-sided L3 nerve root impingement and on the left side at the L4-5 and L5-S1 levels with possible left-sided L4 and L5 nerve root impingement.  Old compression fracture of the T12 vertebral body.  Please see the prior myelogram dated 07/12/2019 showing almost complete block at the L3-L4 level.  All CT scans at this facility are performed  using dose modulation techniques as appropriate to performed exam including the following:  automated exposure control; adjustment of mA and/or kV according to the patients size (this includes techniques or standardized protocols for targeted exams where dose is matched to indication/reason for exam: i.e. extremities or head);  iterative reconstruction technique.     Results for orders placed during the hospital encounter of 08/04/19   CT Thoracic Spine Without Contrast    Narrative COMPARISON:  None  FINDINGS:  Normal vertebral alignment.  Multilevel degenerative changes of the vertebral endplates seen most significantly at the T7-T8-T8-T9 T9-T10 T10-T11 and the T11-T12 vertebra.  No evidence of an acute compression fracture can be seen.  Cannot exclude an old mild wedge compression deformity of the T12 vertebral body.  Multilevel facet arthropathy.  Multilevel disc space narrowings between T4 and T10. Atherosclerotic calcifications of the abdominal aorta without aneurysmal dilatation.  Multiple subpleural blebs present in the adjacent lungs.    Impression Multilevel degenerative changes as described above.  Mild wedge deformity of the T12 vertebral body consistent with an old compression fracture.  No acute fracture identified.  The patient has had a prior myelogram dated 07/12/2019 showing multiple impingement on the ventral thecal sac secondary to disc disease.  T12 vertebra is  similar on the prior study.  All CT scans at this facility are performed  using dose modulation techniques as appropriate to performed exam including the following:  automated exposure control; adjustment of mA and/or kV according to the patients size (this includes techniques or standardized protocols for targeted exams where dose is matched to indication/reason for exam: i.e. extremities or head);  iterative reconstruction technique.       Results for orders placed during the hospital encounter of 07/12/19   X-Ray Hips Bilateral 2 View Incl AP Pelvis    Narrative FINDINGS:  Degenerative changes of the lumbosacral spine and sacroiliac joints.  Bones are demineralized.  Bilateral total hip arthroplasty changes are noted.  No periprosthetic fracture or hardware complication.     Results for orders placed during the hospital encounter of 08/04/19   X-Ray Hip 2 View Left    Narrative COMPARISON:  07/31/2019  FINDINGS:  Bilateral hip replacements are intact in good alignment.  Left hip appears free of any acute fracture or dislocation.     Results for orders placed during the hospital encounter of 07/31/19   CT Cervical Spine Without Contrast    Narrative FINDINGS:  Osteopenia.  Grade 2 degenerative spondylolisthesis at C3-C4.  Grade 1 degenerative spondylolisthesis at C4-C5.  Vertebral body height is normal.  No acute fractures. No prevertebral soft tissue swelling. Atlanto-occipital articulation is normal.  Congenitally patulous spinal canal.  No significant spinal stenosis.  Moderate left foraminal stenosis at C3-C4.    Impression No acute findings.  Degenerative spondylolisthesis at C3-C4 and C4-C5.  All CT scans at this facility use dose modulation, iterative reconstruction, and/or weight base dosing when appropriate to reduce radiation dose to as low as reasonably achievable.     Results for orders placed during the hospital encounter of 07/12/19   Fl Myelogram 2 Or More Regions    Narrative TECHNIQUE:  After  obtaining informed consent from the patient explain the risks, benefits and alternatives to the procedure the patient was placed on the fluoroscopic table in the prone position. The risks included although not limited to bleeding, infection, contrast/drug reaction, nerve root/cord injury,  CSF leak/spinal headache, seizures and herniation. The L2/3 level within isolated under fluoroscopy. The overlying skin was prepped and draped routine sterile fashion. Approximately 1 cc of a 1% lidocaine was used for local anesthesia. Using a 22-gauge spinal needle access was obtained to the thecal sac be a midline translaminar approach. Following removal of stylet spontaneous visualization of clear cerebrospinal fluid was identified. Following this contrast system was attached and approximately 10 cc of a Omnipaque 300 contrast material was infused under fluoroscopy. Patient tolerated procedure well. No immediate postprocedure complication. The stylet was then reinserted and the  spinal needle system was removed in full. Adhesive bandage was placed over the puncture site. Spot overhead fluoroscopic images in the and lateral projection with additional lateral flexion and extension views.  Total fluoro time was 2.1 minutes and 1 fluoroscopic image was obtained.  COMPARISON:  06/04/2019  FINDINGS:  No complications.  Limited images due to limited mobility.    Impression Successful fluoroscopic myelogram. See dedicated CT myelogram thoracic and lumbar spine.     Results for orders placed during the hospital encounter of 07/12/19   CT Myelography Thoracic Lumbar Spine    Narrative COMPARISON:  None  FINDINGS:  Thoracic spine:  There is no acute fracture of the thoracic spine.  There is an old anterior wedge compression fracture of the T12 vertebral body.  There is a Schmorl's node seen within the superior aspect of the T8 vertebral body.  There is a slight levoscoliosis of the thoracic spine centered at T3.  T1-T2:  Unremarkable.  T2-T3: Unremarkable.  T3-T4: There is a disc bulge with left ventral surface thecal sac effacement but no impingement upon the cord.  T4-T5: Unremarkable.  T5-T6: Unremarkable.  T6-T7: There is a central focal disc protrusion which causes right ventral surface cord impingement.  The disc protrusion measures 7 mm in AP dimension, 14 mm in craniocaudal dimension, and 10 mm in transverse dimension.  T7-T8: Unremarkable.  T8-T9: There is a central disc extrusion with the disc extrusion extending inferiorly from the level of the disc and measuring 19 mm in craniocaudal dimension, 8 mm in transverse dimension, and 5 mm in anterior-posterior dimension.  This disc extrusion slightly impinges upon the ventral surface of the cord.  T9-T10: Unremarkable.  T10-T11: There is a disc extrusion in the central to right central location which is extending superiorly from the disc level and measuring 18 mm in craniocaudal dimension, 5 mm in anterior-posterior dimension, and 8 mm in transverse dimension.  This disc extrusion impinges upon the right ventral surface of the cord.  T11-T12: Minimal disc bulge with ventral surface thecal sac effacement but no cord impingement.  T12-L1: Right central disc protrusion measuring 10 mm in craniocaudal dimension, 8 mm in transverse dimension, and 5 mm in anterior-posterior dimension which causes slight right ventral surface cord remodeling.    Lumbar spine:  No fracture of the lumbar spine.  No paraspinal mass.  The conus medullaris has a normal morphology and terminates at the L1-L2 level.  L1-L2: Mild disc bulge with mild ventral surface thecal sac effacement.  Patent neural foramina.  L2-L3: Degenerative disc disease.  Disc bulge with severe central canal stenosis with the AP diameter at the point of maximal stenosis measuring 7 mm.  Mild bilateral neural foraminal narrowing.  Bilateral facet joint osteoarthritis.  L3-L4: Severe degenerative disc disease.  There is critical  central canal stenosis at this level with no contrast-enhanced CSF identified at the disc level.  There is severe crowding of the nerve roots in the compressed thecal sac at this level.  The disc protrusion extends into the right neural foramen causing severe right neural foraminal stenosis and probable impingement upon the exiting right L3 nerve root.  L4-5: Severe degenerative disc disease.  Disc bulge along with ligamentum flavum thickening which causes severe central canal stenosis with the AP diameter of the thecal sac measuring 8 mm the point of maximal stenosis.  The disc protrusion extends into the bilateral neural foramen causing severe right neural foraminal stenosis and moderate left neural foraminal stenosis.  L5-S1: Degenerative disc disease.  Disc bulge with mild central canal narrowing.  Mild to moderate left neural foraminal stenosis.    Impression 1. There is critical central canal stenosis at L3-L4 due to a disc bulge and ligamentum flavum thickening.  2. Severe right L3-L4 neural foraminal stenosis with probable impingement upon the exiting right L3 nerve root.  3. Severe central canal stenosis at L2-L3 and L4-L5 due to disc bulges.  4. Severe right L4-5 neural foraminal stenosis.  5. Multiple disc protrusions in the thoracic spine as detailed each level with some ventral surface cord impingement seen at the T6-T7, T8-T9, and T10-T11 levels.  All CT scans at this facility are performed  using dose modulation techniques as appropriate to performed exam including the following:  automated exposure control; adjustment of mA and/or kV according to the patients size (this includes techniques or standardized protocols for targeted exams where dose is matched to indication/reason for exam: i.e. extremities or head);  iterative reconstruction technique.         Review of Systems:  Review of Systems   Constitutional: Negative for fever.   HENT:        Left-sided rash of the face   Eyes: Negative for  "blurred vision.   Respiratory: Negative for cough and wheezing.    Cardiovascular: Negative for chest pain and orthopnea.   Gastrointestinal: Negative for constipation, diarrhea, nausea and vomiting.   Genitourinary: Negative for dysuria.   Musculoskeletal: Positive for back pain.   Skin: Negative for itching and rash.   Neurological: Positive for weakness.   Endo/Heme/Allergies: Does not bruise/bleed easily.       Physical Exam:  Vitals:  BP (!) 182/84 (BP Location: Right arm, Patient Position: Sitting, BP Method: Medium (Automatic))   Pulse 95   Resp 18   Ht 5' 4" (1.626 m)   Wt 59.9 kg (132 lb)   BMI 22.66 kg/m²   (reviewed on 10/31/2019)    General: alert and oriented, in no apparent distress.  Gait: in wheelchair.  Skin: no rashes, no discoloration, no obvious lesions  HEENT: normocephalic, atraumatic. Pupils equal and round.  Cardiovascular: no significant peripheral edema present.  Respiratory: without use of accessory muscles of respiration.    Musculoskeletal - Lumbar Spine:  - Pain on flexion of lumbar spine: Present  - Pain on extension of lumbar spine: Present  - Lumbar facet loading: Absent   - TTP over the lumbar facet joints: Present  - TTP over the lumbar paraspinals: Present  - TTP over the SI joints:  Absent   - TTP over PSIS: Absent   - Straight Leg Raise: Present on left    Right Shoulder:  - Pain on abduction: Present   - ROM: decreased secondary to pain    - TTP over the AC and GH joint: Present  - Neer's: Positive  - Hawkin's: Positive    Neuro - Lower Extremities:  - RLE Strength:     >> 5/5 strength with right hip flexion/ extension    >> 5/5 strength with right knee flexion/ extension    >> 5/5 strength in right ankle with plantar and dorsiflexion  - LLE Strength:     >> 5/5 strength with left hip flexion/ extension    >> 5/5 strength with knee flexion extension on the left     >> 5/5 strength in left ankle with plantar and dorsiflexion  - Extremity Reflexes: Brisk and symmetric " throughout  - Sensory: Sensation to light touch intact bilaterally      Psych:  Mood and affect is appropriate          Assessment  1. 73 y.o. year old patient with PMH of   Past Medical History:   Diagnosis Date    Abnormal ECG 8/5/2019    Aneurysm     Anticoagulant long-term use     Arthritis     CAD in native artery 8/5/2019    COPD (chronic obstructive pulmonary disease)     Diabetes mellitus     Glaucoma     Hypertension     Seizures     Shingles 05/27/2017    Stroke       presenting with pain located left side of her face, left lower extremity, lumbar spine. Diagnoses include:    ICD-10-CM ICD-9-CM   1. Lumbar radiculopathy M54.16 724.4   2. Hip pain, bilateral M25.551 719.45    M25.552    3. Spondylosis of cervical region without myelopathy or radiculopathy M47.812 721.0   4. Lumbar spondylosis M47.816 721.3   5. Postherpetic neuralgia B02.29 053.19      2. Pain Generators / Etiology: Lumbar Radiculopathy and Lumbar Spondylosis, post herpetic neuralgia  3. Failed Meds (E- Effective, NE- Not Effective):  Celebrex-minimally effective, ibuprofen-minimally effective, gabapentin-minimally effective, Lyrica-minimally effective, Valtrex-minimally effective, steroid-effective  4. Physical Therapy - Completed in the Past  5. Psychological comorbidities - None  6. Anticoagulants / Antiplatelets: Plavix      Plan:  1. Interventional:   - Schedule bilateral L4/5 TF XANDER with RN IV sedation.  Patient is taking Plavix; she will have to stop prior to procedure.  Will obtain clearance from Dr. Dye (cardiology)  - Procedure note: An IM injection of ketolorac 30mg/1mL injection was administered during clinic visit.  This was well tolerated.     2. Pharmacologic: Continue Cymbalta 30 mg once daily PRN.     3. Rehabilitative: Encouraged regular exercise.    4. Diagnostic: radiology reviewed, detailed above.     5. Follow up: 4 week procedure f/u     - This condition does not require this patient to take time off of  work.   - I discussed the risks, benefits, and alternatives to potential treatment options. All questions and concerns were fully addressed today in clinic. Dr. Dias was consulted regarding the patient plan and agrees.

## 2019-10-31 NOTE — TELEPHONE ENCOUNTER
S/w patient's brother. Brother states his sister has been going to outpatient therapy. Last Friday she was released from [facility]. She was given enough medication to last for 7 days. She was seen by a doctor in [place] yesterday and was able to get her medications except percocet's, which was prescribed at [facility]. Her brother said that she was seen by her pain management doctor's assistant and was told they don't prescribe pain medicine. States Percocet's are the only medicines that helps her. Brother states patient is really hurting. Please advise.

## 2019-10-31 NOTE — PROGRESS NOTES
Patient is currently in a nursing home and has labs in care everywhere  10/21/19 Health maintenance reviewed.  Care Everywhere checked. Immunizations reviewed and reconciled.

## 2019-10-31 NOTE — TELEPHONE ENCOUNTER
----- Message from Karen Royal sent at 10/31/2019  1:33 PM CDT -----  Contact: marcy/  Please call pt  @ [phone], states he have questions for nurse regarding pt.

## 2019-11-01 ENCOUNTER — TELEPHONE (OUTPATIENT)
Dept: PAIN MEDICINE | Facility: CLINIC | Age: 74
End: 2019-11-01

## 2019-11-01 ENCOUNTER — TELEPHONE (OUTPATIENT)
Dept: NEUROSURGERY | Facility: CLINIC | Age: 74
End: 2019-11-01

## 2019-11-01 DIAGNOSIS — Z12.39 BREAST CANCER SCREENING: ICD-10-CM

## 2019-11-01 RX ORDER — OXYCODONE AND ACETAMINOPHEN 5; 325 MG/1; MG/1
1 TABLET ORAL EVERY 6 HOURS PRN
Qty: 12 TABLET | Refills: 0 | OUTPATIENT
Start: 2019-11-01

## 2019-11-01 NOTE — TELEPHONE ENCOUNTER
----- Message from Domenica Lott PA-C sent at 11/1/2019 11:52 AM CDT -----  Can you let her know we reviewed x-rays?  They showed degenerative changes like we expected, NO fractures.

## 2019-11-01 NOTE — TELEPHONE ENCOUNTER
Phoned pt to inform her that her percocet medication rx was rejected.    Pt did not answer. I was not able to leave a brief message due to mailbox being full.

## 2019-11-04 ENCOUNTER — TELEPHONE (OUTPATIENT)
Dept: PAIN MEDICINE | Facility: CLINIC | Age: 74
End: 2019-11-04

## 2019-11-04 ENCOUNTER — TELEPHONE (OUTPATIENT)
Dept: INTERNAL MEDICINE | Facility: CLINIC | Age: 74
End: 2019-11-04

## 2019-11-04 NOTE — TELEPHONE ENCOUNTER
Noted    ----- Message from Jarrell Dye MD sent at 11/4/2019  2:37 PM CST -----  Hi yes she can stop aspirin/ Plavix for 7 days and resume post. Thanks    - Dr. Dye  ----- Message -----  From: Ira Cross LPN  Sent: 11/4/2019   2:32 PM CST  To: Jarrell Dye MD    Good afternoon!    Dr. Dias would like to move forward with Bilateral L4/5 Lumbar TF Epidural Steroid Injection for patient. Pt is currently on Plavix 75mg and will need to d/c 7 days prior to having this injection along with all other blood thinners including aspirin. Please advise or contact me at ext [phone] with questions. Thanks//magnus

## 2019-11-04 NOTE — TELEPHONE ENCOUNTER
Contacted patient; spoke to patient's brother, Mohamud.  Procedure scheduled.  Instructions given again verbally.     ----- Message from Jarrell Dye MD sent at 11/4/2019  2:37 PM CST -----  Hi yes she can stop aspirin/ Plavix for 7 days and resume post. Thanks    - Dr. Dye  ----- Message -----  From: Ira Cross LPN  Sent: 11/4/2019   2:32 PM CST  To: Jarrell Dye MD    Good afternoon!    Dr. Dias would like to move forward with Bilateral L4/5 Lumbar TF Epidural Steroid Injection for patient. Pt is currently on Plavix 75mg and will need to d/c 7 days prior to having this injection along with all other blood thinners including aspirin. Please advise or contact me at ext [phone] with questions. Thanks//magnus

## 2019-11-04 NOTE — TELEPHONE ENCOUNTER
----- Message from Marcia Brambila sent at 11/4/2019  1:16 PM CST -----  Contact: pt Brother ? raghavendra Dao is calling the staff regarding some information about some medication for the pt.    Pt call [phone]    Thanks

## 2019-11-05 ENCOUNTER — TELEPHONE (OUTPATIENT)
Dept: INTERNAL MEDICINE | Facility: CLINIC | Age: 74
End: 2019-11-05

## 2019-11-05 DIAGNOSIS — M48.061 NEUROFORAMINAL STENOSIS OF LUMBAR SPINE: Primary | ICD-10-CM

## 2019-11-08 RX ORDER — OXYCODONE AND ACETAMINOPHEN 5; 325 MG/1; MG/1
1 TABLET ORAL EVERY 6 HOURS PRN
Qty: 12 TABLET | Refills: 0 | Status: SHIPPED | OUTPATIENT
Start: 2019-11-08 | End: 2019-12-30

## 2019-11-08 RX ORDER — CYCLOBENZAPRINE HCL 5 MG
5 TABLET ORAL 3 TIMES DAILY PRN
Qty: 30 TABLET | Refills: 1 | Status: SHIPPED | OUTPATIENT
Start: 2019-11-08 | End: 2019-11-26 | Stop reason: SDUPTHER

## 2019-11-08 NOTE — TELEPHONE ENCOUNTER
I've sent in a short supply to get her to her injection. Due to tightening of [employer] regulations in light of the opioid epidemic, I will not fill any additional opiate prescriptions.

## 2019-11-12 ENCOUNTER — HOSPITAL ENCOUNTER (OUTPATIENT)
Facility: HOSPITAL | Age: 74
Discharge: HOME | End: 2019-11-12
Attending: PAIN MEDICINE | Admitting: PAIN MEDICINE
Payer: MEDICARE

## 2019-11-12 VITALS
BODY MASS INDEX: 21.83 KG/M2 | DIASTOLIC BLOOD PRESSURE: 87 MMHG | SYSTOLIC BLOOD PRESSURE: 161 MMHG | OXYGEN SATURATION: 100 % | RESPIRATION RATE: 16 BRPM | HEIGHT: 64 IN | HEART RATE: 91 BPM | WEIGHT: 127.88 LBS | TEMPERATURE: 98 F

## 2019-11-12 DIAGNOSIS — M54.16 LUMBAR RADICULOPATHY: Primary | ICD-10-CM

## 2019-11-12 LAB — POCT GLUCOSE: 96 MG/DL (ref 70–110)

## 2019-11-12 PROCEDURE — 63600175 PHARM REV CODE 636 W HCPCS: Performed by: PAIN MEDICINE

## 2019-11-12 PROCEDURE — 64483 NJX AA&/STRD TFRM EPI L/S 1: CPT | Mod: 50,,, | Performed by: PAIN MEDICINE

## 2019-11-12 PROCEDURE — 25000003 PHARM REV CODE 250: Performed by: PAIN MEDICINE

## 2019-11-12 PROCEDURE — 99152 PR MOD CONSCIOUS SEDATION, SAME PHYS, 5+ YRS, FIRST 15 MIN: ICD-10-PCS | Mod: ,,, | Performed by: PAIN MEDICINE

## 2019-11-12 PROCEDURE — A9585 GADOBUTROL INJECTION: HCPCS | Performed by: PAIN MEDICINE

## 2019-11-12 PROCEDURE — 99152 MOD SED SAME PHYS/QHP 5/>YRS: CPT | Mod: ,,, | Performed by: PAIN MEDICINE

## 2019-11-12 PROCEDURE — 64483 NJX AA&/STRD TFRM EPI L/S 1: CPT | Mod: 50 | Performed by: PAIN MEDICINE

## 2019-11-12 PROCEDURE — 25500020 PHARM REV CODE 255: Performed by: PAIN MEDICINE

## 2019-11-12 PROCEDURE — 64483 PR EPIDURAL INJ, ANES/STEROID, TRANSFORAMINAL, LUMB/SACR, SNGL LEVL: ICD-10-PCS | Mod: 50,,, | Performed by: PAIN MEDICINE

## 2019-11-12 RX ORDER — LIDOCAINE HYDROCHLORIDE 10 MG/ML
INJECTION, SOLUTION EPIDURAL; INFILTRATION; INTRACAUDAL; PERINEURAL
Status: DISCONTINUED | OUTPATIENT
Start: 2019-11-12 | End: 2019-11-12 | Stop reason: HOSPADM

## 2019-11-12 RX ORDER — GADOBUTROL 604.72 MG/ML
INJECTION INTRAVENOUS
Status: DISCONTINUED | OUTPATIENT
Start: 2019-11-12 | End: 2019-11-12 | Stop reason: HOSPADM

## 2019-11-12 RX ORDER — DEXAMETHASONE SODIUM PHOSPHATE 10 MG/ML
INJECTION INTRAMUSCULAR; INTRAVENOUS
Status: DISCONTINUED | OUTPATIENT
Start: 2019-11-12 | End: 2019-11-12 | Stop reason: HOSPADM

## 2019-11-12 RX ORDER — MIDAZOLAM HYDROCHLORIDE 1 MG/ML
INJECTION, SOLUTION INTRAMUSCULAR; INTRAVENOUS
Status: DISCONTINUED | OUTPATIENT
Start: 2019-11-12 | End: 2019-11-12 | Stop reason: HOSPADM

## 2019-11-12 RX ORDER — FENTANYL CITRATE 50 UG/ML
INJECTION, SOLUTION INTRAMUSCULAR; INTRAVENOUS
Status: DISCONTINUED | OUTPATIENT
Start: 2019-11-12 | End: 2019-11-12 | Stop reason: HOSPADM

## 2019-11-12 NOTE — H&P
Chief Pain Complaint:   Low back pain   Right shoulder pain   Interval History: Patient was seen on 8/4/19. At that time she underwent bilateral L3/4 TF XANDER. The patient reports that she is/was better following the procedure. she reports 75% pain relief. She had a fall, then pain increased. She is currently living at [facility]. She is doing at home therapy there, which is helping. She is in wheelchair now but hopes to transition to walker soon. After the fall, she was not able to get out of the bed.   Initial History of Present Illness:   This patient is a 73 y.o. female who presents today complaining of the above noted pain/s. The patient describes the pain as follows. 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 She reports approximately 2 years ago she had to sudden deaths in the family which caused very stressful time for her and resulted in shingles rash in the left V1 distribution however she reports having excruciating pain in the left V1 and V2 distributions. She denies having difficulty with eating food and brushing her teeth on the left side of her face however the left lateral side of her nose gets her excruciating pain. She has been tried on numerous medications in the past including gabapentin, Lyrica, Valtrex, Celebrex, ibuprofen which have all provided minimal benefit however steroids have been most helpful. Today she rates her pain as an 8/10 and describes a constant burning sensation the left side of her face in addition to radiation into her bilateral lower extremities, her right shoulder, her left upper extremity occasionally as a pins and needle sensation. She does report having numbness and weakness in her left lower extremity. She has been physical therapy which she completed in February of 2019 however this caused most of her low back and leg symptoms to worsen  in addition to her post herpetic neuralgia to worsen. She denies having bowel bladder difficulties. Her symptoms are worse with activity such as walking in her somewhat improved with rest however she had finds it difficult to get into a comfortable position. She has been using topical lidocaine patches and applying to the left side of her face which does provide significant benefit. 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   Previous Therapy:   Medications: Celebrex, ibuprofen, gabapentin, Lyrica, Valtrex, steroids   Injections: bilateral L3/4 TF XANDER on 8/4/19 with 75% pain relief   Surgeries: None   Physical Therapy: Completed in the Past         Past Surgical History:   Procedure Laterality Date    BRAIN SURGERY      CARDIAC CATHETERIZATION      CORONARY ANGIOPLASTY      HYSTERECTOMY      TRANSFORAMINAL EPIDURAL INJECTION OF STEROID Bilateral 7/23/2019    Procedure: Bilateral L3/4 Transforaminal Epidural Steroid Injection; Surgeon: Desean Dias MD; Location: [facility]; Service: Pain Management; Laterality: Bilateral;     Imaging / Labs / Studies (reviewed on 10/31/2019):        Results for orders placed during the hospital encounter of 08/04/19   CT Lumbar Spine Without Contrast    Narrative FINDINGS:   Wedge compression deformity of T12 consistent with an old fracture. No bowing of the posterior cortex.Gas formation within the disc at the L2-3 L3-4 L4-5 and L5-S1 levels.Bilateral facet arthropathy and neural foraminal narrowing seen most significantly at the L3-4 L4-5 and L5-S1 levels with possible bilateral L3-L4 and L5 nerve root impingement in the neural foramina. 13 degree lumbar scoliotic curvature convex to the left side.    Impression Mild lumbar scoliotic curvature convex to the left side with multilevel disc space narrowing seen most significantly at the  L3-4 L4-5 and L5-S1 levels. Bony neural foraminal narrowing with possible nerve root impingement seen most significantly at the L3-L4 level on the right side with possible right-sided L3 nerve root impingement and on the left side at the L4-5 and L5-S1 levels with possible left-sided L4 and L5 nerve root impingement. Old compression fracture of the T12 vertebral body. Please see the prior myelogram dated 07/12/2019 showing almost complete block at the L3-L4 level.   All CT scans at this facility are performed using dose modulation techniques as appropriate to performed exam including the following: automated exposure control; adjustment of mA and/or kV according to the patients size (this includes techniques or standardized protocols for targeted exams where dose is matched to indication/reason for exam: i.e. extremities or head); iterative reconstruction technique.          Results for orders placed during the hospital encounter of 08/04/19   CT Thoracic Spine Without Contrast    Narrative COMPARISON:   None   FINDINGS:   Normal vertebral alignment. Multilevel degenerative changes of the vertebral endplates seen most significantly at the T7-T8-T8-T9 T9-T10 T10-T11 and the T11-T12 vertebra. No evidence of an acute compression fracture can be seen. Cannot exclude an old mild wedge compression deformity of the T12 vertebral body. Multilevel facet arthropathy. Multilevel disc space narrowings between T4 and T10. Atherosclerotic calcifications of the abdominal aorta without aneurysmal dilatation. Multiple subpleural blebs present in the adjacent lungs.    Impression Multilevel degenerative changes as described above. Mild wedge deformity of the T12 vertebral body consistent with an old compression fracture. No acute fracture identified.   The patient has had a prior myelogram dated 07/12/2019 showing multiple impingement on the ventral thecal sac secondary to disc disease. T12 vertebra is similar on the prior study.    All CT scans at this facility are performed using dose modulation techniques as appropriate to performed exam including the following: automated exposure control; adjustment of mA and/or kV according to the patients size (this includes techniques or standardized protocols for targeted exams where dose is matched to indication/reason for exam: i.e. extremities or head); iterative reconstruction technique.          Results for orders placed during the hospital encounter of 07/12/19   X-Ray Hips Bilateral 2 View Incl AP Pelvis    Narrative FINDINGS:   Degenerative changes of the lumbosacral spine and sacroiliac joints. Bones are demineralized.   Bilateral total hip arthroplasty changes are noted. No periprosthetic fracture or hardware complication.          Results for orders placed during the hospital encounter of 08/04/19   X-Ray Hip 2 View Left    Narrative COMPARISON:   07/31/2019   FINDINGS:   Bilateral hip replacements are intact in good alignment. Left hip appears free of any acute fracture or dislocation.          Results for orders placed during the hospital encounter of 07/31/19   CT Cervical Spine Without Contrast    Narrative FINDINGS:   Osteopenia. Grade 2 degenerative spondylolisthesis at C3-C4. Grade 1 degenerative spondylolisthesis at C4-C5. Vertebral body height is normal. No acute fractures. No prevertebral soft tissue swelling. Atlanto-occipital articulation is normal. Congenitally patulous spinal canal. No significant spinal stenosis. Moderate left foraminal stenosis at C3-C4.    Impression No acute findings. Degenerative spondylolisthesis at C3-C4 and C4-C5.   All CT scans at this facility use dose modulation, iterative reconstruction, and/or weight base dosing when appropriate to reduce radiation dose to as low as reasonably achievable.          Results for orders placed during the hospital encounter of 07/12/19   Fl Myelogram 2 Or More Regions    Narrative TECHNIQUE:   After obtaining informed  consent from the patient explain the risks, benefits and alternatives to the procedure the patient was placed on the fluoroscopic table in the prone position. The risks included although not limited to bleeding, infection, contrast/drug reaction, nerve root/cord injury, CSF leak/spinal headache, seizures and herniation. The L2/3 level within isolated under fluoroscopy. The overlying skin was prepped and draped routine sterile fashion. Approximately 1 cc of a 1% lidocaine was used for local anesthesia. Using a 22-gauge spinal needle access was obtained to the thecal sac be a midline translaminar approach. Following removal of stylet spontaneous visualization of clear cerebrospinal fluid was identified. Following this contrast system was attached and approximately 10 cc of a Omnipaque 300 contrast material was infused under fluoroscopy. Patient tolerated procedure well. No immediate postprocedure complication. The stylet was then reinserted and the spinal needle system was removed in full. Adhesive bandage was placed over the puncture site. Spot overhead fluoroscopic images in the and lateral projection with additional lateral flexion and extension views.   Total fluoro time was 2.1 minutes and 1 fluoroscopic image was obtained.   COMPARISON:   06/04/2019   FINDINGS:   No complications. Limited images due to limited mobility.    Impression Successful fluoroscopic myelogram. See dedicated CT myelogram thoracic and lumbar spine.          Results for orders placed during the hospital encounter of 07/12/19   CT Myelography Thoracic Lumbar Spine    Narrative COMPARISON:   None   FINDINGS:   Thoracic spine:   There is no acute fracture of the thoracic spine. There is an old anterior wedge compression fracture of the T12 vertebral body. There is a Schmorl's node seen within the superior aspect of the T8 vertebral body. There is a slight levoscoliosis of the thoracic spine centered at T3.   T1-T2: Unremarkable.   T2-T3:  Unremarkable.   T3-T4: There is a disc bulge with left ventral surface thecal sac effacement but no impingement upon the cord.   T4-T5: Unremarkable.   T5-T6: Unremarkable.   T6-T7: There is a central focal disc protrusion which causes right ventral surface cord impingement. The disc protrusion measures 7 mm in AP dimension, 14 mm in craniocaudal dimension, and 10 mm in transverse dimension.   T7-T8: Unremarkable.   T8-T9: There is a central disc extrusion with the disc extrusion extending inferiorly from the level of the disc and measuring 19 mm in craniocaudal dimension, 8 mm in transverse dimension, and 5 mm in anterior-posterior dimension. This disc extrusion slightly impinges upon the ventral surface of the cord.   T9-T10: Unremarkable.   T10-T11: There is a disc extrusion in the central to right central location which is extending superiorly from the disc level and measuring 18 mm in craniocaudal dimension, 5 mm in anterior-posterior dimension, and 8 mm in transverse dimension. This disc extrusion impinges upon the right ventral surface of the cord.   T11-T12: Minimal disc bulge with ventral surface thecal sac effacement but no cord impingement.   T12-L1: Right central disc protrusion measuring 10 mm in craniocaudal dimension, 8 mm in transverse dimension, and 5 mm in anterior-posterior dimension which causes slight right ventral surface cord remodeling.   Lumbar spine:   No fracture of the lumbar spine. No paraspinal mass. The conus medullaris has a normal morphology and terminates at the L1-L2 level.   L1-L2: Mild disc bulge with mild ventral surface thecal sac effacement. Patent neural foramina.   L2-L3: Degenerative disc disease. Disc bulge with severe central canal stenosis with the AP diameter at the point of maximal stenosis measuring 7 mm. Mild bilateral neural foraminal narrowing. Bilateral facet joint osteoarthritis.   L3-L4: Severe degenerative disc disease. There is critical central canal stenosis  at this level with no contrast-enhanced CSF identified at the disc level. There is severe crowding of the nerve roots in the compressed thecal sac at this level. The disc protrusion extends into the right neural foramen causing severe right neural foraminal stenosis and probable impingement upon the exiting right L3 nerve root.   L4-5: Severe degenerative disc disease. Disc bulge along with ligamentum flavum thickening which causes severe central canal stenosis with the AP diameter of the thecal sac measuring 8 mm the point of maximal stenosis. The disc protrusion extends into the bilateral neural foramen causing severe right neural foraminal stenosis and moderate left neural foraminal stenosis.   L5-S1: Degenerative disc disease. Disc bulge with mild central canal narrowing. Mild to moderate left neural foraminal stenosis.    Impression 1. There is critical central canal stenosis at L3-L4 due to a disc bulge and ligamentum flavum thickening.   2. Severe right L3-L4 neural foraminal stenosis with probable impingement upon the exiting right L3 nerve root.   3. Severe central canal stenosis at L2-L3 and L4-L5 due to disc bulges.   4. Severe right L4-5 neural foraminal stenosis.   5. Multiple disc protrusions in the thoracic spine as detailed each level with some ventral surface cord impingement seen at the T6-T7, T8-T9, and T10-T11 levels.   All CT scans at this facility are performed using dose modulation techniques as appropriate to performed exam including the following: automated exposure control; adjustment of mA and/or kV according to the patients size (this includes techniques or standardized protocols for targeted exams where dose is matched to indication/reason for exam: i.e. extremities or head); iterative reconstruction technique.   Review of Systems:   Review of Systems   Constitutional: Negative for fever.   HENT:   Left-sided rash of the face   Eyes: Negative for blurred vision.   Respiratory: Negative  "for cough and wheezing.   Cardiovascular: Negative for chest pain and orthopnea.   Gastrointestinal: Negative for constipation, diarrhea, nausea and vomiting.   Genitourinary: Negative for dysuria.   Musculoskeletal: Positive for back pain.   Skin: Negative for itching and rash.   Neurological: Positive for weakness.   Endo/Heme/Allergies: Does not bruise/bleed easily.   Physical Exam:   Vitals:   BP (!) 182/84 (BP Location: Right arm, Patient Position: Sitting, BP Method: Medium (Automatic))  Pulse 95  Resp 18  Ht 5' 4" (1.626 m)  Wt 59.9 kg (132 lb)  BMI 22.66 kg/m²   (reviewed on 10/31/2019)   General: alert and oriented, in no apparent distress.   Gait: in wheelchair.   Skin: no rashes, no discoloration, no obvious lesions   HEENT: normocephalic, atraumatic. Pupils equal and round.   Cardiovascular: no significant peripheral edema present.   Respiratory: without use of accessory muscles of respiration.   Musculoskeletal - Lumbar Spine:   - Pain on flexion of lumbar spine: Present   - Pain on extension of lumbar spine: Present   - Lumbar facet loading: Absent   - TTP over the lumbar facet joints: Present   - TTP over the lumbar paraspinals: Present   - TTP over the SI joints: Absent   - TTP over PSIS: Absent   - Straight Leg Raise: Present on left   Right Shoulder:   - Pain on abduction: Present   - ROM: decreased secondary to pain   - TTP over the AC and GH joint: Present   - Neer's: Positive   - Hawkin's: Positive   Neuro - Lower Extremities:   - RLE Strength:   >> 5/5 strength with right hip flexion/ extension   >> 5/5 strength with right knee flexion/ extension   >> 5/5 strength in right ankle with plantar and dorsiflexion   - LLE Strength:   >> 5/5 strength with left hip flexion/ extension   >> 5/5 strength with knee flexion extension on the left   >> 5/5 strength in left ankle with plantar and dorsiflexion   - Extremity Reflexes: Brisk and symmetric throughout   - Sensory: Sensation to light touch " intact bilaterally   Psych: Mood and affect is appropriate   Assessment   1. 73 y.o. year old patient with PMH of        Past Medical History:   Diagnosis Date    Abnormal ECG 8/5/2019    Aneurysm     Anticoagulant long-term use     Arthritis     CAD in native artery 8/5/2019    COPD (chronic obstructive pulmonary disease)     Diabetes mellitus     Glaucoma     Hypertension     Seizures     Shingles 05/27/2017    Stroke      presenting with pain located left side of her face, left lower extremity, lumbar spine. Diagnoses include:     ICD-10-CM ICD-9-CM   1. Lumbar radiculopathy M54.16 724.4   2. Hip pain, bilateral M25.551 719.45    M25.552    3. Spondylosis of cervical region without myelopathy or radiculopathy M47.812 721.0   4. Lumbar spondylosis M47.816 721.3   5. Postherpetic neuralgia B02.29 053.19     2. Pain Generators / Etiology: Lumbar Radiculopathy and Lumbar Spondylosis, post herpetic neuralgia   3. Failed Meds (E- Effective, NE- Not Effective): Celebrex-minimally effective, ibuprofen-minimally effective, gabapentin-minimally effective, Lyrica-minimally effective, Valtrex-minimally effective, steroid-effective   4. Physical Therapy - Completed in the Past   5. Psychological comorbidities - None   6. Anticoagulants / Antiplatelets: Plavix   Plan:   1. Interventional:   - Schedule bilateral L4/5 TF XANDER with RN IV sedation.    SCOUT Dias MD  Interventional Pain Medicine  [facility] [place]

## 2019-11-12 NOTE — DISCHARGE INSTRUCTIONS

## 2019-11-12 NOTE — OP NOTE
"Procedure: Lumbar Transforaminal Epidural Steroid Injection under Fluoroscopic Guidance (supraneural approach)    Level: L4/5     Side: Bilateral    PROCEDURE DATE: 11/12/2019    Pre-operative Diagnosis: Lumbar Radiculopathy  Post-operative Diagnosis: Lumbar Radiculopathy    Provider: SCOUT Dias MD  Assistant(s): None    Anesthesia: Local, IV Sedation    >> 1 mg of VERSED    >> 50 mcg of FENTANYL     Indication: Low back pain with radiculopathy consistent with distribution of targeted nerve. Symptoms unresponsive to conservative treatments. Fluoroscopy was used to optimize visualization of needle placement and to maximize safety.     Procedure Description / Technique:  The patient was seen and identified in the preoperative area. Risks, benefits, complications, and alternatives were discussed with the patient. The patient agreed to proceed with the procedure and signed the consent. The site and side of the procedure was identified and marked. An IV was started. The patient was taken to the procedural suite.    The patient was positioned in prone orientation on procedure table and a pillow was placed under the abdomen to reduce lumbar lordosis. A time out was performed prior to any intervention. The procedure, site, side, and allergies were stated and agreed to by all present. The lumbosacral area was widely prepped with ChloraPrep. The procedural site was draped in usual sterile fashion. Vital signs were closely monitored throughout this procedure. Conscious sedation was used for this procedure to decrease patient anxiety.    The target area was visualized under fluoroscopy. The cephalocaudal angle of the fluoroscope was adjusted as to align the vertebral end plates. The fluoroscopic arm was rotated ipsilaterally to an angle of approximately 30 degrees until the "alberto dog" outline came into view and the tip of the inferior superior articular process pointed towards the midline, 6:00 position of the above " "pedicle. A 25 gauge 3.5 inch spinal needle was directed towards the "chin" of the "alberto dog" (adjacent to the pars interarticularis and inferior to the pedicle). The needle was advanced until OS was met at the inferior border of the pedicle / pars interface. The needle was adjusted so that it would pass inferior to the osseous border. The fluoroscope was then placed in the lateral position and the needle was slowly advanced until it rested in the posterior 1/3rd of the vertebral foramen. AP fluoroscopy was checked and the needle tip rested at the 6:00 position under the pedicle. No paresthesia was elicited during needle placement. With the needle tip in its final position, gentle aspiration was negative for blood and CSF. gadavist (1 to 2 mL) was injected under live fluoroscopy. Microbore tubing was used for injection. There was no pain or paresthesia on injection. The contrast clearly delineated the targeted nerve root on AP fluoroscopy. No vascular uptake was seen. A solution containing 2 mL of 1% PF Lidocaine and 2 mL of Dexamethasone (10 mg/mL) was mixed and 2 mL was injected slowly at each level targeted. There was minimal resistance on injection. No pain or paresthesia was elicited on injection. The stylet was replaced and the needle was withdrawn intact. This procedure was performed for each of the above indicated levels.     Description of Findings: Not applicable    Prosthetic devices, grafts, tissues, or devices implanted: None    Specimen Removed: No    Estimated Blood Loss: minimal    COMPLICATIONS: None    DISPOSITION / PLANS: The patient was transferred to the recovery area in a stable condition for observation. The patient was reexamined prior to discharge. There was no evidence of acute neurologic injury following the procedure.  Patient was discharged from the recovery room after meeting discharge criteria. Home discharge instructions were given to the patient by the staff.    "

## 2019-11-13 DIAGNOSIS — I25.118 CORONARY ARTERY DISEASE OF NATIVE ARTERY OF NATIVE HEART WITH STABLE ANGINA PECTORIS: Primary | ICD-10-CM

## 2019-11-13 RX ORDER — CLOPIDOGREL BISULFATE 75 MG/1
75 TABLET ORAL DAILY
Qty: 30 TABLET | Refills: 6 | Status: SHIPPED | OUTPATIENT
Start: 2019-11-13 | End: 2019-11-14 | Stop reason: SDUPTHER

## 2019-11-13 NOTE — DISCHARGE SUMMARY
[facility]  Short Stay  Discharge Summary    Admit Date: 11/12/2019    Discharge Date and Time: 11/12/2019 11:05 AM      Discharge Attending Physician: SCOUT Dias MD     Hospital Course (synopsis of major diagnoses, care, treatment, and services provided during the course of the hospital stay): Patient was admitted to Pre-op where informed consent was signed.  The patient was then taken to the procedure suite where the procedure was performed.  The patient was then return to the Pre-Op area and discharge was performed.     Final Diagnoses:    Principal Problem: <principal problem not specified>   Secondary Diagnoses:   Active Hospital Problems    Diagnosis  POA    Lumbar radiculopathy [M54.16]  Yes      Resolved Hospital Problems   No resolved problems to display.       Discharged Condition: good    Disposition: Home or Self Care    Follow up/Patient Instructions:    Medications:  Reconciled Home Medications:      Medication List      CONTINUE taking these medications    albuterol 90 mcg/actuation inhaler  Commonly known as:  PROVENTIL/VENTOLIN HFA  Inhale 2 puffs into the lungs every 4 (four) hours as needed.     albuterol-ipratropium 2.5 mg-0.5 mg/3 mL nebulizer solution  Commonly known as:  DUO-NEB  Take 3 mLs by nebulization every 6 (six) hours as needed for Wheezing. Rescue     amLODIPine 10 MG tablet  Commonly known as:  NORVASC  Take 1 tablet (10 mg total) by mouth once daily.     cholecalciferol (vitamin D3) 1,000 unit capsule  Commonly known as:  VITAMIN D3  Take 10 capsules (10,000 Units total) by mouth once daily. Twice weekly     clopidogrel 75 mg tablet  Commonly known as:  PLAVIX  Take 1 tablet (75 mg total) by mouth once daily.     cyclobenzaprine 5 MG tablet  Commonly known as:  FLEXERIL  Take 1 tablet (5 mg total) by mouth 3 (three) times daily as needed for Muscle spasms.     dexlansoprazole 60 mg capsule  Commonly known as:  DEXILANT  Take 1 capsule (60 mg total) by mouth  once daily.     diltiaZEM 120 mg 24 hr tablet  Commonly known as:  CARDIZEM LA  Take 1 tablet (120 mg total) by mouth once daily.     DULoxetine 30 MG capsule  Commonly known as:  CYMBALTA  Take 1 capsule (30 mg total) by mouth once daily.     escitalopram oxalate 20 MG tablet  Commonly known as:  LEXAPRO  Take 1 tablet (20 mg total) by mouth once daily.     gemfibrozil 600 MG tablet  Commonly known as:  LOPID  Take 1 tablet (600 mg total) by mouth once daily.     GI COCKTAIL SIMPLE RECORD  Take 50 mLs by mouth 2 (two) times daily.     hydrocodone-chlorpheniramine 10-8 mg/5 mL suspension  Commonly known as:  TUSSIONEX  Take 5 mLs by mouth every 12 (twelve) hours as needed for Cough or Congestion.     metFORMIN 500 MG tablet  Commonly known as:  GLUCOPHAGE  Take 1 tablet (500 mg total) by mouth 2 (two) times daily.     metoprolol tartrate 100 MG tablet  Commonly known as:  LOPRESSOR  Take 1 tablet (100 mg total) by mouth 2 (two) times daily.     ondansetron 4 MG tablet  Commonly known as:  ZOFRAN  Take 1 tablet (4 mg total) by mouth every 6 (six) hours.     oxyCODONE-acetaminophen 5-325 mg per tablet  Commonly known as:  PERCOCET  Take 1 tablet by mouth every 6 (six) hours as needed for Pain.     predniSONE 10 MG tablet  Commonly known as:  DELTASONE  40 mg PO QD X 3 days then 30 mg PO QD x 3 days then 20 mg PO QD X 3 days then 10 mg PO QD X 3 days then 5 mg PO QD x 3 days then stop.     promethazine 6.25 mg/5 mL syrup  Commonly known as:  PHENERGAN  Take 5 mLs (6.25 mg total) by mouth every 6 (six) hours as needed (cough).     ranolazine 1,000 mg Tb12  Commonly known as:  RANEXA  Take 1 tablet (1,000 mg total) by mouth 2 (two) times daily.     temazepam 15 mg Cap  Commonly known as:  RESTORIL  Take 1 capsule (15 mg total) by mouth every evening.     tiotropium 18 mcg inhalation capsule  Commonly known as:  SPIRIVA  Inhale 1 capsule (18 mcg total) into the lungs once daily.     tolterodine 2 MG Tab  Commonly known  as:  DETROL  Take 1 tablet (2 mg total) by mouth once daily. Take 4 mg by mouth once daily.     valsartan 320 MG tablet  Commonly known as:  DIOVAN  Take 1 tablet (320 mg total) by mouth once daily.     venlafaxine 37.5 MG 24 hr capsule  Commonly known as:  EFFEXOR-XR  Take 1 capsule (37.5 mg total) by mouth once daily.          Discharge Procedure Orders   Diet general     Call MD for:  severe uncontrolled pain     Call MD for:  difficulty breathing, headache or visual disturbances     Call MD for:  redness, tenderness, or signs of infection (pain, swelling, redness, odor or green/yellow discharge around incision site)     Activity as tolerated

## 2019-11-14 DIAGNOSIS — E78.49 OTHER HYPERLIPIDEMIA: ICD-10-CM

## 2019-11-14 DIAGNOSIS — I25.118 CORONARY ARTERY DISEASE OF NATIVE ARTERY OF NATIVE HEART WITH STABLE ANGINA PECTORIS: ICD-10-CM

## 2019-11-14 DIAGNOSIS — I10 ESSENTIAL HYPERTENSION: Primary | ICD-10-CM

## 2019-11-14 DIAGNOSIS — R00.2 PALPITATIONS: ICD-10-CM

## 2019-11-14 RX ORDER — VALSARTAN 320 MG/1
320 TABLET ORAL DAILY
Qty: 30 TABLET | Refills: 3 | Status: ON HOLD | OUTPATIENT
Start: 2019-11-14 | End: 2023-08-16 | Stop reason: HOSPADM

## 2019-11-14 RX ORDER — RANOLAZINE 1000 MG/1
1000 TABLET, EXTENDED RELEASE ORAL 2 TIMES DAILY
Qty: 60 TABLET | Refills: 3 | Status: SHIPPED | OUTPATIENT
Start: 2019-11-14 | End: 2020-05-22

## 2019-11-14 RX ORDER — METOPROLOL TARTRATE 100 MG/1
100 TABLET ORAL 2 TIMES DAILY
Qty: 60 TABLET | Refills: 3 | Status: SHIPPED | OUTPATIENT
Start: 2019-11-14 | End: 2019-12-03 | Stop reason: SDUPTHER

## 2019-11-14 RX ORDER — DILTIAZEM HYDROCHLORIDE EXTENDED-RELEASE TABLETS 120 MG/1
120 TABLET, EXTENDED RELEASE ORAL DAILY
Qty: 30 TABLET | Refills: 3 | Status: SHIPPED | OUTPATIENT
Start: 2019-11-14 | End: 2021-08-20

## 2019-11-14 RX ORDER — AMLODIPINE BESYLATE 10 MG/1
10 TABLET ORAL DAILY
Qty: 30 TABLET | Refills: 3 | Status: SHIPPED | OUTPATIENT
Start: 2019-11-14 | End: 2020-09-16

## 2019-11-14 RX ORDER — CLOPIDOGREL BISULFATE 75 MG/1
75 TABLET ORAL DAILY
Qty: 30 TABLET | Refills: 6 | Status: SHIPPED | OUTPATIENT
Start: 2019-11-14 | End: 2020-07-13 | Stop reason: SDUPTHER

## 2019-11-26 DIAGNOSIS — M48.061 NEUROFORAMINAL STENOSIS OF LUMBAR SPINE: ICD-10-CM

## 2019-11-27 RX ORDER — CYCLOBENZAPRINE HCL 5 MG
5 TABLET ORAL 3 TIMES DAILY PRN
Qty: 30 TABLET | Refills: 1 | Status: SHIPPED | OUTPATIENT
Start: 2019-11-27 | End: 2020-01-27

## 2019-11-27 NOTE — TELEPHONE ENCOUNTER
She needs a better mattresses the one she has on her hospital bed is causing her to get bedsores . The brother who handles all of her care spoke with [facility] and they would just need a order for the air mattresses that releases pressure Advise!!!!

## 2019-11-27 NOTE — TELEPHONE ENCOUNTER
----- Message from My Connor sent at 11/27/2019  9:57 AM CST -----  Contact: Rm-son  .Type:  Patient Returning Call    Who Called:Rm  Who Left Message for Patient:Madie   Does the patient know what this is regarding?:refil/ questions  Would the patient rather a call back or a response via [facility]? Call back  Best Call Back Number:[phone]  Additional Information:

## 2019-11-27 NOTE — TELEPHONE ENCOUNTER
----- Message from Genia Mccormack sent at 11/27/2019  8:44 AM CST -----  Contact: pt Brother Mr Ojeda  Stated he's calling about pt health, he can be reached at [phone] Thanks

## 2019-12-02 ENCOUNTER — TELEPHONE (OUTPATIENT)
Dept: INTERNAL MEDICINE | Facility: CLINIC | Age: 74
End: 2019-12-02

## 2019-12-02 DIAGNOSIS — L89.90 PRESSURE INJURY OF SKIN, UNSPECIFIED INJURY STAGE, UNSPECIFIED LOCATION: Primary | ICD-10-CM

## 2019-12-02 DIAGNOSIS — J44.9 CHRONIC OBSTRUCTIVE PULMONARY DISEASE, UNSPECIFIED COPD TYPE: ICD-10-CM

## 2019-12-02 NOTE — TELEPHONE ENCOUNTER
----- Message from Karen Royal sent at 12/2/2019  7:28 AM CST -----  Contact: Rm/  Please call pt  @ [phone], brother have questions for nurse.

## 2019-12-02 NOTE — TELEPHONE ENCOUNTER
She needs a mattresses that blows up for her hospital bed. The one she has is causing her to have bedsores . Please do a order for her . Pt is seeing Dr. Chaudhari tomorrow for Hospital follow.will print order so she can take with her .         [facility]  Fax there and the last progress note from last visit.

## 2019-12-02 NOTE — TELEPHONE ENCOUNTER
Referral made to [facility]; mattress ordered but needs a provider to examine at home for documentation to justify the need

## 2019-12-02 NOTE — PROGRESS NOTES
I sent a referral to [facility] so that a provider can see her at home if that would be most convenient; we need to confirm that they see Medicaid patients. Should be able to cancel with Dr. Chaudhari if so--if she truly has bed sores, she must have a lot of trouble getting around.

## 2019-12-03 ENCOUNTER — LAB VISIT (OUTPATIENT)
Dept: LAB | Facility: HOSPITAL | Age: 74
End: 2019-12-03
Attending: PEDIATRICS
Payer: MEDICARE

## 2019-12-03 ENCOUNTER — OFFICE VISIT (OUTPATIENT)
Dept: INTERNAL MEDICINE | Facility: CLINIC | Age: 74
End: 2019-12-03
Payer: MEDICARE

## 2019-12-03 VITALS
RESPIRATION RATE: 16 BRPM | OXYGEN SATURATION: 99 % | SYSTOLIC BLOOD PRESSURE: 108 MMHG | BODY MASS INDEX: 22.32 KG/M2 | WEIGHT: 130.75 LBS | HEART RATE: 100 BPM | HEIGHT: 64 IN | TEMPERATURE: 99 F | DIASTOLIC BLOOD PRESSURE: 72 MMHG

## 2019-12-03 DIAGNOSIS — L89.151 PRESSURE INJURY OF SACRAL REGION, STAGE 1: ICD-10-CM

## 2019-12-03 DIAGNOSIS — K21.9 GASTROESOPHAGEAL REFLUX DISEASE, ESOPHAGITIS PRESENCE NOT SPECIFIED: ICD-10-CM

## 2019-12-03 DIAGNOSIS — Z98.890 HISTORY OF CEREBRAL ANEURYSM REPAIR: ICD-10-CM

## 2019-12-03 DIAGNOSIS — M48.062 SPINAL STENOSIS OF LUMBAR REGION WITH NEUROGENIC CLAUDICATION: ICD-10-CM

## 2019-12-03 DIAGNOSIS — I10 ESSENTIAL HYPERTENSION: ICD-10-CM

## 2019-12-03 DIAGNOSIS — Z86.73 HX OF COMPLETED STROKE: ICD-10-CM

## 2019-12-03 DIAGNOSIS — N18.30 CONTROLLED TYPE 2 DIABETES MELLITUS WITH STAGE 3 CHRONIC KIDNEY DISEASE, WITHOUT LONG-TERM CURRENT USE OF INSULIN: ICD-10-CM

## 2019-12-03 DIAGNOSIS — R56.9 FOCAL SEIZURES: ICD-10-CM

## 2019-12-03 DIAGNOSIS — M81.0 OSTEOPOROSIS, UNSPECIFIED OSTEOPOROSIS TYPE, UNSPECIFIED PATHOLOGICAL FRACTURE PRESENCE: ICD-10-CM

## 2019-12-03 DIAGNOSIS — Z74.1 REQUIRES DAILY ASSISTANCE FOR ACTIVITIES OF DAILY LIVING (ADL) AND COMFORT NEEDS: ICD-10-CM

## 2019-12-03 DIAGNOSIS — Z74.2 NEED FOR HOME HEALTH CARE: ICD-10-CM

## 2019-12-03 DIAGNOSIS — N18.30 CONTROLLED TYPE 2 DIABETES MELLITUS WITH STAGE 3 CHRONIC KIDNEY DISEASE, WITHOUT LONG-TERM CURRENT USE OF INSULIN: Primary | ICD-10-CM

## 2019-12-03 DIAGNOSIS — E11.22 CONTROLLED TYPE 2 DIABETES MELLITUS WITH STAGE 3 CHRONIC KIDNEY DISEASE, WITHOUT LONG-TERM CURRENT USE OF INSULIN: Primary | ICD-10-CM

## 2019-12-03 DIAGNOSIS — E11.22 CONTROLLED TYPE 2 DIABETES MELLITUS WITH STAGE 3 CHRONIC KIDNEY DISEASE, WITHOUT LONG-TERM CURRENT USE OF INSULIN: ICD-10-CM

## 2019-12-03 DIAGNOSIS — Z86.79 HISTORY OF CEREBRAL ANEURYSM REPAIR: ICD-10-CM

## 2019-12-03 DIAGNOSIS — I25.118 CORONARY ARTERY DISEASE OF NATIVE ARTERY OF NATIVE HEART WITH STABLE ANGINA PECTORIS: ICD-10-CM

## 2019-12-03 DIAGNOSIS — R29.6 RECURRENT FALLS: ICD-10-CM

## 2019-12-03 DIAGNOSIS — J41.1 MUCOPURULENT CHRONIC BRONCHITIS: Chronic | ICD-10-CM

## 2019-12-03 DIAGNOSIS — J84.112 UIP (USUAL INTERSTITIAL PNEUMONITIS): ICD-10-CM

## 2019-12-03 PROBLEM — I50.9 CHF (CONGESTIVE HEART FAILURE): Status: RESOLVED | Noted: 2019-06-06 | Resolved: 2019-12-03

## 2019-12-03 LAB
ALBUMIN SERPL BCP-MCNC: 3.1 G/DL (ref 3.5–5.2)
ALP SERPL-CCNC: 77 U/L (ref 55–135)
ALT SERPL W/O P-5'-P-CCNC: 8 U/L (ref 10–44)
ANION GAP SERPL CALC-SCNC: 11 MMOL/L (ref 8–16)
AST SERPL-CCNC: 18 U/L (ref 10–40)
BASOPHILS # BLD AUTO: 0.03 K/UL (ref 0–0.2)
BASOPHILS NFR BLD: 0.4 % (ref 0–1.9)
BILIRUB SERPL-MCNC: 0.3 MG/DL (ref 0.1–1)
BUN SERPL-MCNC: 10 MG/DL (ref 8–23)
CALCIUM SERPL-MCNC: 10.2 MG/DL (ref 8.7–10.5)
CHLORIDE SERPL-SCNC: 109 MMOL/L (ref 95–110)
CHOLEST SERPL-MCNC: 161 MG/DL (ref 120–199)
CHOLEST/HDLC SERPL: 3.1 {RATIO} (ref 2–5)
CO2 SERPL-SCNC: 19 MMOL/L (ref 23–29)
CREAT SERPL-MCNC: 0.8 MG/DL (ref 0.5–1.4)
DIFFERENTIAL METHOD: ABNORMAL
EOSINOPHIL # BLD AUTO: 0.1 K/UL (ref 0–0.5)
EOSINOPHIL NFR BLD: 1.7 % (ref 0–8)
ERYTHROCYTE [DISTWIDTH] IN BLOOD BY AUTOMATED COUNT: 15.7 % (ref 11.5–14.5)
EST. GFR  (AFRICAN AMERICAN): >60 ML/MIN/1.73 M^2
EST. GFR  (NON AFRICAN AMERICAN): >60 ML/MIN/1.73 M^2
ESTIMATED AVG GLUCOSE: 82 MG/DL (ref 68–131)
GLUCOSE SERPL-MCNC: 92 MG/DL (ref 70–110)
HBA1C MFR BLD HPLC: 4.5 % (ref 4–5.6)
HCT VFR BLD AUTO: 36.9 % (ref 37–48.5)
HDLC SERPL-MCNC: 52 MG/DL (ref 40–75)
HDLC SERPL: 32.3 % (ref 20–50)
HGB BLD-MCNC: 11.5 G/DL (ref 12–16)
IMM GRANULOCYTES # BLD AUTO: 0.06 K/UL (ref 0–0.04)
IMM GRANULOCYTES NFR BLD AUTO: 0.8 % (ref 0–0.5)
LDLC SERPL CALC-MCNC: 80 MG/DL (ref 63–159)
LYMPHOCYTES # BLD AUTO: 2.2 K/UL (ref 1–4.8)
LYMPHOCYTES NFR BLD: 29.2 % (ref 18–48)
MCH RBC QN AUTO: 29.2 PG (ref 27–31)
MCHC RBC AUTO-ENTMCNC: 31.2 G/DL (ref 32–36)
MCV RBC AUTO: 94 FL (ref 82–98)
MONOCYTES # BLD AUTO: 0.6 K/UL (ref 0.3–1)
MONOCYTES NFR BLD: 7.6 % (ref 4–15)
NEUTROPHILS # BLD AUTO: 4.5 K/UL (ref 1.8–7.7)
NEUTROPHILS NFR BLD: 60.3 % (ref 38–73)
NONHDLC SERPL-MCNC: 109 MG/DL
NRBC BLD-RTO: 0 /100 WBC
PLATELET # BLD AUTO: 337 K/UL (ref 150–350)
PMV BLD AUTO: 9.9 FL (ref 9.2–12.9)
POTASSIUM SERPL-SCNC: 3.6 MMOL/L (ref 3.5–5.1)
PROT SERPL-MCNC: 7.5 G/DL (ref 6–8.4)
RBC # BLD AUTO: 3.94 M/UL (ref 4–5.4)
SODIUM SERPL-SCNC: 139 MMOL/L (ref 136–145)
TRIGL SERPL-MCNC: 145 MG/DL (ref 30–150)
WBC # BLD AUTO: 7.53 K/UL (ref 3.9–12.7)

## 2019-12-03 PROCEDURE — 83036 HEMOGLOBIN GLYCOSYLATED A1C: CPT

## 2019-12-03 PROCEDURE — 80053 COMPREHEN METABOLIC PANEL: CPT

## 2019-12-03 PROCEDURE — 85025 COMPLETE CBC W/AUTO DIFF WBC: CPT

## 2019-12-03 PROCEDURE — 1159F PR MEDICATION LIST DOCUMENTED IN MEDICAL RECORD: ICD-10-PCS | Mod: ,,, | Performed by: PEDIATRICS

## 2019-12-03 PROCEDURE — 1159F MED LIST DOCD IN RCRD: CPT | Mod: ,,, | Performed by: PEDIATRICS

## 2019-12-03 PROCEDURE — 1125F PR PAIN SEVERITY QUANTIFIED, PAIN PRESENT: ICD-10-PCS | Mod: ,,, | Performed by: PEDIATRICS

## 2019-12-03 PROCEDURE — 99999 PR PBB SHADOW E&M-EST. PATIENT-LVL IV: CPT | Mod: PBBFAC,,, | Performed by: PEDIATRICS

## 2019-12-03 PROCEDURE — 99215 PR OFFICE/OUTPT VISIT, EST, LEVL V, 40-54 MIN: ICD-10-PCS | Mod: S$PBB,,, | Performed by: PEDIATRICS

## 2019-12-03 PROCEDURE — 36415 COLL VENOUS BLD VENIPUNCTURE: CPT

## 2019-12-03 PROCEDURE — 99999 PR PBB SHADOW E&M-EST. PATIENT-LVL IV: ICD-10-PCS | Mod: PBBFAC,,, | Performed by: PEDIATRICS

## 2019-12-03 PROCEDURE — 99215 OFFICE O/P EST HI 40 MIN: CPT | Mod: S$PBB,,, | Performed by: PEDIATRICS

## 2019-12-03 PROCEDURE — 80061 LIPID PANEL: CPT

## 2019-12-03 PROCEDURE — 99214 OFFICE O/P EST MOD 30 MIN: CPT | Mod: PBBFAC | Performed by: PEDIATRICS

## 2019-12-03 PROCEDURE — 1125F AMNT PAIN NOTED PAIN PRSNT: CPT | Mod: ,,, | Performed by: PEDIATRICS

## 2019-12-03 RX ORDER — SENNOSIDES 8.6 MG/1
CAPSULE, GELATIN COATED ORAL
COMMUNITY
End: 2022-01-16

## 2019-12-03 RX ORDER — LORATADINE 10 MG/1
1 TABLET ORAL DAILY
COMMUNITY
End: 2022-01-16

## 2019-12-03 RX ORDER — VENLAFAXINE HYDROCHLORIDE 150 MG/1
150 TABLET, EXTENDED RELEASE ORAL DAILY
COMMUNITY
End: 2021-02-26

## 2019-12-03 RX ORDER — METOPROLOL TARTRATE 25 MG/1
TABLET, FILM COATED ORAL
Refills: 1 | COMMUNITY
Start: 2019-11-29 | End: 2020-04-28 | Stop reason: SDUPTHER

## 2019-12-03 RX ORDER — FLUTICASONE FUROATE AND VILANTEROL 200; 25 UG/1; UG/1
1 POWDER RESPIRATORY (INHALATION) NIGHTLY
COMMUNITY
End: 2020-01-23 | Stop reason: SDUPTHER

## 2019-12-03 RX ORDER — TOLTERODINE 4 MG/1
4 CAPSULE, EXTENDED RELEASE ORAL DAILY
COMMUNITY
End: 2020-04-28 | Stop reason: SDUPTHER

## 2019-12-03 RX ORDER — ESLICARBAZEPINE ACETATE 600 MG/1
1 TABLET ORAL NIGHTLY
COMMUNITY
End: 2020-04-27 | Stop reason: SDUPTHER

## 2019-12-03 NOTE — PROGRESS NOTES
Subjective:       Patient ID: Shireen Felix is a 73 y.o. female.    Chief Complaint: Hospital Follow Up; Establish Care; and Medication Refill    She is here to establish care with me as I see her family. She was hospitalized for recurrent falls, possible seizures and dehydration and then was too debilitated to go home and spent November in rehab and nursing home. Since being at home, she finds the hospital bed uncomfortable and she has bed sore, she is in PT outpatient 3x week and she and her brother reports that she is ambulating with assistance some. She lives with daughter. Her Home glucose on qd reading by recall are 120-150 rabge. She needs to establish here for prolia with rheum, was previously getting outside. Her back is being addressed by PMR. She has upcoming cards and pulmonary. Her GERD is quiet, she says she is taking 3 meds for it. Not reflected on med card. Just ranitidine and dexilant.    Review of Systems   Constitutional: Negative for fever and unexpected weight change.   HENT: Negative for congestion and rhinorrhea.    Eyes: Negative for discharge and redness.   Respiratory: Positive for cough and shortness of breath. Negative for wheezing.         Minimal long term   Cardiovascular: Negative for chest pain, palpitations and leg swelling.   Gastrointestinal: Negative for constipation, diarrhea and vomiting.   Endocrine: Negative for polydipsia, polyphagia and polyuria.   Genitourinary: Positive for difficulty urinating (on detrol). Negative for decreased urine volume and menstrual problem.   Musculoskeletal: Positive for arthralgias, back pain and gait problem. Negative for joint swelling.   Skin: Negative for rash and wound.   Neurological: Positive for seizures (none since hospitalization- poorly defined). Negative for syncope and headaches.        Left forehead post shingles neuropathy   Psychiatric/Behavioral: Positive for dysphoric mood (chronic low level) and sleep disturbance (due to  pain). Negative for behavioral problems. The patient is nervous/anxious.        Objective:      Physical Exam   Constitutional: She is oriented to person, place, and time. No distress.   Chronically ill, and thin NAD   Neck: No JVD present. No thyromegaly present.   Cardiovascular: Normal rate, regular rhythm and normal heart sounds.   No murmur heard.  Pulmonary/Chest: Effort normal and breath sounds normal. No respiratory distress. She has no wheezes. She has no rales. She exhibits no tenderness.   Abdominal: Soft. She exhibits no distension and no mass. There is no tenderness. There is no guarding.   Lymphadenopathy:     She has no cervical adenopathy.   Neurological: She is alert and oriented to person, place, and time. No cranial nerve deficit. She exhibits abnormal muscle tone. Coordination abnormal.   Poor gait needs assistance to get out of wheel chair has to use hands. 3/5 lower ext at best. 4/5 upper ext.   Skin: Capillary refill takes less than 2 seconds. No rash noted.   Dark complexion, almost hemochromatosis like, post herpetic scars left forehead. Grade 1 bedsore sacral at top of buttocks about 4 CM2   Psychiatric: She has a normal mood and affect. Her behavior is normal. Thought content normal.       Assessment:       1. Controlled type 2 diabetes mellitus with stage 3 chronic kidney disease, without long-term current use of insulin    2. Essential hypertension    3. Hx of completed stroke    4. Coronary artery disease of native artery of native heart with stable angina pectoris    5. Focal seizures    6. Gastroesophageal reflux disease, esophagitis presence not specified    7. History of cerebral aneurysm repair    8. Spinal stenosis of lumbar region with neurogenic claudication    9. UIP (usual interstitial pneumonitis)    10. Recurrent falls    11. Mucopurulent chronic bronchitis    12. Osteoporosis, unspecified osteoporosis type, unspecified pathological fracture presence    13. Pressure injury  of sacral region, stage 1        Plan:       Controlled type 2 diabetes mellitus with stage 3 chronic kidney disease, without long-term current use of insulin  -     Lipid panel; Future; Expected date: 12/03/2019  -     Comprehensive metabolic panel; Future; Expected date: 12/03/2019  -     Hemoglobin A1c; Future; Expected date: 12/03/2019  -     Microalbumin/creatinine urine ratio; Future; Expected date: 12/03/2019  -     CBC auto differential; Future; Expected date: 12/03/2019    Essential hypertension    Hx of completed stroke    Coronary artery disease of native artery of native heart with stable angina pectoris    Focal seizures    Gastroesophageal reflux disease, esophagitis presence not specified    History of cerebral aneurysm repair    Spinal stenosis of lumbar region with neurogenic claudication    UIP (usual interstitial pneumonitis)    Recurrent falls    Mucopurulent chronic bronchitis    Osteoporosis, unspecified osteoporosis type, unspecified pathological fracture presence  -     Ambulatory consult to Rheumatology  -     DXA Bone Density Spine And Hip; Future; Expected date: 12/03/2019    Pressure injury of sacral region, stage 1    Polypharmacy has to be a concern. Await labs to see if metformin is appropriate with renal function. I would like pulmonary and cardiology to review respective meds and reduce where appropriate. She will bring in meds to all appts to review until med list stabilized. She has numerous depression/neuropathy meds which should be reevaluated by PMR. I would stop Restoril and not refill percocet(patient states she has been out prior to NH d/c. Get dexascan and see rheum for prolia. F/U in 2-4 weeks to reassess. HH to assess bedsore and new air mattress.

## 2019-12-05 ENCOUNTER — TELEPHONE (OUTPATIENT)
Dept: INTERNAL MEDICINE | Facility: CLINIC | Age: 74
End: 2019-12-05

## 2019-12-05 DIAGNOSIS — Z86.73 HX OF COMPLETED STROKE: Primary | ICD-10-CM

## 2019-12-05 DIAGNOSIS — L89.151 PRESSURE INJURY OF SACRAL REGION, STAGE 1: ICD-10-CM

## 2019-12-05 NOTE — TELEPHONE ENCOUNTER
S/w Pham w/ out ext  Therapy, she confirmed pt there for PT visit right now and pt mentioned we'd referred pt for HH, and she confirmed since they are ext outpt PT, pt can't be on HH and have ext PT w/ them. Advised her would request PT services thru HH since per Dr. Chaudhari pt has to be on HH since has pressure ulcers and needs HH eval/SN. She voiced understanding, confirming they would d/c pt thru their PT service so pt could have HH and would advise pt as such.

## 2019-12-05 NOTE — TELEPHONE ENCOUNTER
(See previous call documentation) S/w Priya @ [facility] stating pt wants to refuse HH and keep PT at their office. S/w pt directly, pt voiced understanding that as long as she is at [facility] since it's ext PT she cannot have HH at same time. Pt asked us to s/w brother Rm as well for his agreement w/ plan. S/w pt's brother Rm and reviewed as above, brother states since pt is improving with [facility] PT, he confirmed they will not have HH at this time and she will continue PT at [facility]. Advised him and pt would let Dr. Chaudhari know, and Pham w/ [facility] advised as well.

## 2019-12-05 NOTE — TELEPHONE ENCOUNTER
After calling I have been transferred to 3 different people and just place on hold . No one apparently knows what the call was in regards to .

## 2019-12-05 NOTE — TELEPHONE ENCOUNTER
----- Message from Angeline Cameron sent at 12/5/2019 12:37 PM CST -----  Contact: [facility]   They are requesting call back in regards to needing order signed for home health services         Pls call back at [phone]

## 2019-12-05 NOTE — TELEPHONE ENCOUNTER
Per Dr. Chaudhari, pt needs decubitus care, referral to [facility] sent to Dr. Chaudhari for auth.

## 2019-12-05 NOTE — TELEPHONE ENCOUNTER
----- Message from Genia Mccormack sent at 12/5/2019 10:31 AM CST -----  Contact: [facility] - Ms Powers  Stated she's returning a call about the pt home health care , she can be reached at [phone] Thanks

## 2019-12-05 NOTE — TELEPHONE ENCOUNTER
----- Message from Rayne Maguire sent at 12/5/2019 10:06 AM CST -----  Priya Quinones is calling in regards to pt therapy. Caller states that she was told by the pt that the doctor order home health for her and need to know which one the doctor what her to do because she cant do both.   ..[phone]

## 2019-12-06 ENCOUNTER — TELEPHONE (OUTPATIENT)
Dept: INTERNAL MEDICINE | Facility: CLINIC | Age: 74
End: 2019-12-06

## 2019-12-06 ENCOUNTER — TELEPHONE (OUTPATIENT)
Dept: HOME HEALTH SERVICES | Facility: HOSPITAL | Age: 74
End: 2019-12-06

## 2019-12-06 DIAGNOSIS — L89.151 PRESSURE INJURY OF SACRAL REGION, STAGE 1: ICD-10-CM

## 2019-12-06 DIAGNOSIS — Z86.73 HX OF COMPLETED STROKE: Primary | ICD-10-CM

## 2019-12-06 NOTE — TELEPHONE ENCOUNTER
----- Message from Nat Nath sent at 12/6/2019  9:52 AM CST -----  Contact: Pt Brother   Pt brother is calling regarding requesting to have nurse eddie to back. Pt states that call is concerning pt care. .[phone]     .Thank You  Nat Nath

## 2019-12-06 NOTE — TELEPHONE ENCOUNTER
CARLEE Chaudhari Jr., MD   to Me            12/5/19 12:52 PM   It is there choice, patient needs decubitus care, send to wound clinic.

## 2019-12-06 NOTE — TELEPHONE ENCOUNTER
Returned call to pt's brother Rm, no answer, left voicemail requesting brother return call to clinic.

## 2019-12-06 NOTE — TELEPHONE ENCOUNTER
Advised pt's brother Rm that order for hosp bed Mattress sent to [facility], and per Dr. Chaudhari since pt can't have HH while on PT at [facility], Dr. Chaudhari order WC for sacral ulcer which has been sent to [facility] and their office will be calling to sched pt. Brother voiced understanding and to CB PRN.

## 2019-12-06 NOTE — TELEPHONE ENCOUNTER
LATE ENTRY 12/05/19: Faxed hospital mattress order w/ pt's snapshot and demographics to [facility] in [place], fax transmission confirmed F#[phone].

## 2019-12-06 NOTE — TELEPHONE ENCOUNTER
Faxed  referral w/ pt's demographics and snapshot to [facility], instructing their office to call pt/brother for appt, fax transmission confirmed F#[phone].

## 2019-12-23 ENCOUNTER — TELEPHONE (OUTPATIENT)
Dept: PAIN MEDICINE | Facility: CLINIC | Age: 74
End: 2019-12-23

## 2019-12-23 ENCOUNTER — HOSPITAL ENCOUNTER (INPATIENT)
Facility: HOSPITAL | Age: 74
LOS: 2 days | Discharge: HOME | DRG: 603 | End: 2019-12-26
Attending: EMERGENCY MEDICINE | Admitting: INTERNAL MEDICINE
Payer: MEDICARE

## 2019-12-23 DIAGNOSIS — M25.532 WRIST PAIN, LEFT: ICD-10-CM

## 2019-12-23 DIAGNOSIS — M25.539 WRIST PAIN: ICD-10-CM

## 2019-12-23 DIAGNOSIS — L03.114 CELLULITIS OF LEFT WRIST: Primary | ICD-10-CM

## 2019-12-23 DIAGNOSIS — B96.89 ACUTE BACTERIAL LYMPHANGITIS: ICD-10-CM

## 2019-12-23 DIAGNOSIS — L03.91 ACUTE BACTERIAL LYMPHANGITIS: ICD-10-CM

## 2019-12-23 PROBLEM — G89.29 CHRONIC BACK PAIN: Status: ACTIVE | Noted: 2019-12-23

## 2019-12-23 PROBLEM — I63.9 STROKE: Status: ACTIVE | Noted: 2019-12-23

## 2019-12-23 PROBLEM — D64.9 NORMOCYTIC ANEMIA: Status: ACTIVE | Noted: 2019-12-23

## 2019-12-23 PROBLEM — I15.2 HYPERTENSION ASSOCIATED WITH DIABETES: Status: ACTIVE | Noted: 2019-12-23

## 2019-12-23 PROBLEM — J44.9 COPD (CHRONIC OBSTRUCTIVE PULMONARY DISEASE): Status: ACTIVE | Noted: 2019-12-23

## 2019-12-23 PROBLEM — M19.032 OSTEOARTHRITIS OF LEFT WRIST: Status: ACTIVE | Noted: 2019-12-23

## 2019-12-23 PROBLEM — G40.909 SEIZURE DISORDER: Status: ACTIVE | Noted: 2019-12-23

## 2019-12-23 PROBLEM — G89.4 CHRONIC PAIN SYNDROME: Status: ACTIVE | Noted: 2019-12-23

## 2019-12-23 PROBLEM — E11.59 HYPERTENSION ASSOCIATED WITH DIABETES: Status: ACTIVE | Noted: 2019-12-23

## 2019-12-23 PROBLEM — E87.1 HYPONATREMIA: Status: ACTIVE | Noted: 2019-12-23

## 2019-12-23 PROBLEM — M54.9 CHRONIC BACK PAIN: Status: ACTIVE | Noted: 2019-12-23

## 2019-12-23 PROBLEM — Z86.73 HISTORY OF STROKE: Status: ACTIVE | Noted: 2019-12-23

## 2019-12-23 PROBLEM — Z86.19 HISTORY OF STAPH INFECTION: Status: ACTIVE | Noted: 2019-12-23

## 2019-12-23 PROBLEM — R53.81 DEBILITY: Status: ACTIVE | Noted: 2019-12-23

## 2019-12-23 LAB
ANION GAP SERPL CALC-SCNC: 12 MMOL/L (ref 8–16)
BASOPHILS # BLD AUTO: 0.02 K/UL (ref 0–0.2)
BASOPHILS NFR BLD: 0.1 % (ref 0–1.9)
BUN SERPL-MCNC: 11 MG/DL (ref 8–23)
CALCIUM SERPL-MCNC: 10.1 MG/DL (ref 8.7–10.5)
CHLORIDE SERPL-SCNC: 104 MMOL/L (ref 95–110)
CO2 SERPL-SCNC: 19 MMOL/L (ref 23–29)
CREAT SERPL-MCNC: 0.8 MG/DL (ref 0.5–1.4)
CRP SERPL-MCNC: 11.9 MG/L (ref 0–8.2)
DIFFERENTIAL METHOD: ABNORMAL
EOSINOPHIL # BLD AUTO: 0.1 K/UL (ref 0–0.5)
EOSINOPHIL NFR BLD: 0.7 % (ref 0–8)
ERYTHROCYTE [DISTWIDTH] IN BLOOD BY AUTOMATED COUNT: 15.2 % (ref 11.5–14.5)
ERYTHROCYTE [SEDIMENTATION RATE] IN BLOOD BY WESTERGREN METHOD: 70 MM/HR (ref 0–20)
EST. GFR  (AFRICAN AMERICAN): >60 ML/MIN/1.73 M^2
EST. GFR  (NON AFRICAN AMERICAN): >60 ML/MIN/1.73 M^2
GLUCOSE SERPL-MCNC: 93 MG/DL (ref 70–110)
HCT VFR BLD AUTO: 34.4 % (ref 37–48.5)
HGB BLD-MCNC: 10.6 G/DL (ref 12–16)
IMM GRANULOCYTES # BLD AUTO: 0.12 K/UL (ref 0–0.04)
IMM GRANULOCYTES NFR BLD AUTO: 0.9 % (ref 0–0.5)
LYMPHOCYTES # BLD AUTO: 2 K/UL (ref 1–4.8)
LYMPHOCYTES NFR BLD: 14.8 % (ref 18–48)
MCH RBC QN AUTO: 29 PG (ref 27–31)
MCHC RBC AUTO-ENTMCNC: 30.8 G/DL (ref 32–36)
MCV RBC AUTO: 94 FL (ref 82–98)
MONOCYTES # BLD AUTO: 0.9 K/UL (ref 0.3–1)
MONOCYTES NFR BLD: 6.8 % (ref 4–15)
NEUTROPHILS # BLD AUTO: 10.5 K/UL (ref 1.8–7.7)
NEUTROPHILS NFR BLD: 76.7 % (ref 38–73)
NRBC BLD-RTO: 0 /100 WBC
PLATELET # BLD AUTO: 348 K/UL (ref 150–350)
PMV BLD AUTO: 9.4 FL (ref 9.2–12.9)
POCT GLUCOSE: 85 MG/DL (ref 70–110)
POTASSIUM SERPL-SCNC: 4.1 MMOL/L (ref 3.5–5.1)
RBC # BLD AUTO: 3.65 M/UL (ref 4–5.4)
SODIUM SERPL-SCNC: 135 MMOL/L (ref 136–145)
URATE SERPL-MCNC: 2.7 MG/DL (ref 2.4–5.7)
WBC # BLD AUTO: 13.74 K/UL (ref 3.9–12.7)

## 2019-12-23 PROCEDURE — 25000003 PHARM REV CODE 250: Performed by: NURSE PRACTITIONER

## 2019-12-23 PROCEDURE — 96374 THER/PROPH/DIAG INJ IV PUSH: CPT | Mod: 59 | Performed by: PHYSICIAN ASSISTANT

## 2019-12-23 PROCEDURE — 84550 ASSAY OF BLOOD/URIC ACID: CPT

## 2019-12-23 PROCEDURE — 99285 EMERGENCY DEPT VISIT HI MDM: CPT | Mod: 25

## 2019-12-23 PROCEDURE — 96375 TX/PRO/DX INJ NEW DRUG ADDON: CPT

## 2019-12-23 PROCEDURE — 85025 COMPLETE CBC W/AUTO DIFF WBC: CPT

## 2019-12-23 PROCEDURE — 86140 C-REACTIVE PROTEIN: CPT

## 2019-12-23 PROCEDURE — 25000242 PHARM REV CODE 250 ALT 637 W/ HCPCS: Performed by: INTERNAL MEDICINE

## 2019-12-23 PROCEDURE — S0077 INJECTION, CLINDAMYCIN PHOSP: HCPCS | Performed by: NURSE PRACTITIONER

## 2019-12-23 PROCEDURE — 96375 TX/PRO/DX INJ NEW DRUG ADDON: CPT | Performed by: PHYSICIAN ASSISTANT

## 2019-12-23 PROCEDURE — 63600175 PHARM REV CODE 636 W HCPCS: Performed by: PHYSICIAN ASSISTANT

## 2019-12-23 PROCEDURE — 96372 THER/PROPH/DIAG INJ SC/IM: CPT | Performed by: PHYSICIAN ASSISTANT

## 2019-12-23 PROCEDURE — 25000003 PHARM REV CODE 250: Performed by: INTERNAL MEDICINE

## 2019-12-23 PROCEDURE — 63600175 PHARM REV CODE 636 W HCPCS: Performed by: INTERNAL MEDICINE

## 2019-12-23 PROCEDURE — 63600175 PHARM REV CODE 636 W HCPCS: Performed by: NURSE PRACTITIONER

## 2019-12-23 PROCEDURE — 87040 BLOOD CULTURE FOR BACTERIA: CPT | Mod: 59

## 2019-12-23 PROCEDURE — 25000003 PHARM REV CODE 250: Performed by: PHYSICIAN ASSISTANT

## 2019-12-23 PROCEDURE — 80048 BASIC METABOLIC PNL TOTAL CA: CPT

## 2019-12-23 PROCEDURE — G0378 HOSPITAL OBSERVATION PER HR: HCPCS

## 2019-12-23 PROCEDURE — 96365 THER/PROPH/DIAG IV INF INIT: CPT | Performed by: PHYSICIAN ASSISTANT

## 2019-12-23 PROCEDURE — 85651 RBC SED RATE NONAUTOMATED: CPT

## 2019-12-23 PROCEDURE — 94640 AIRWAY INHALATION TREATMENT: CPT

## 2019-12-23 RX ORDER — CLOPIDOGREL BISULFATE 75 MG/1
75 TABLET ORAL DAILY
Status: DISCONTINUED | OUTPATIENT
Start: 2019-12-24 | End: 2019-12-26 | Stop reason: HOSPADM

## 2019-12-23 RX ORDER — ONDANSETRON 2 MG/ML
4 INJECTION INTRAMUSCULAR; INTRAVENOUS EVERY 8 HOURS PRN
Status: DISCONTINUED | OUTPATIENT
Start: 2019-12-23 | End: 2019-12-23

## 2019-12-23 RX ORDER — HYDROMORPHONE HYDROCHLORIDE 2 MG/ML
1 INJECTION, SOLUTION INTRAMUSCULAR; INTRAVENOUS; SUBCUTANEOUS
Status: COMPLETED | OUTPATIENT
Start: 2019-12-23 | End: 2019-12-23

## 2019-12-23 RX ORDER — SODIUM CHLORIDE 0.9 % (FLUSH) 0.9 %
10 SYRINGE (ML) INJECTION
Status: DISCONTINUED | OUTPATIENT
Start: 2019-12-23 | End: 2019-12-26 | Stop reason: HOSPADM

## 2019-12-23 RX ORDER — LOSARTAN POTASSIUM 50 MG/1
50 TABLET ORAL DAILY
Status: DISCONTINUED | OUTPATIENT
Start: 2019-12-24 | End: 2019-12-24

## 2019-12-23 RX ORDER — ONDANSETRON 4 MG/1
4 TABLET, ORALLY DISINTEGRATING ORAL ONCE
Status: COMPLETED | OUTPATIENT
Start: 2019-12-23 | End: 2019-12-23

## 2019-12-23 RX ORDER — RANOLAZINE 500 MG/1
1000 TABLET, EXTENDED RELEASE ORAL 2 TIMES DAILY
Status: DISCONTINUED | OUTPATIENT
Start: 2019-12-23 | End: 2019-12-26 | Stop reason: HOSPADM

## 2019-12-23 RX ORDER — MORPHINE SULFATE 4 MG/ML
2 INJECTION, SOLUTION INTRAMUSCULAR; INTRAVENOUS EVERY 4 HOURS PRN
Status: DISCONTINUED | OUTPATIENT
Start: 2019-12-23 | End: 2019-12-23

## 2019-12-23 RX ORDER — IPRATROPIUM BROMIDE AND ALBUTEROL SULFATE 2.5; .5 MG/3ML; MG/3ML
3 SOLUTION RESPIRATORY (INHALATION) 4 TIMES DAILY
Status: DISCONTINUED | OUTPATIENT
Start: 2019-12-23 | End: 2019-12-24

## 2019-12-23 RX ORDER — VANCOMYCIN HCL IN 5 % DEXTROSE 1G/250ML
1000 PLASTIC BAG, INJECTION (ML) INTRAVENOUS
Status: DISCONTINUED | OUTPATIENT
Start: 2019-12-23 | End: 2019-12-23

## 2019-12-23 RX ORDER — POLYETHYLENE GLYCOL 3350 17 G/17G
17 POWDER, FOR SOLUTION ORAL 2 TIMES DAILY PRN
Status: DISCONTINUED | OUTPATIENT
Start: 2019-12-23 | End: 2019-12-26 | Stop reason: HOSPADM

## 2019-12-23 RX ORDER — PANTOPRAZOLE SODIUM 40 MG/1
40 TABLET, DELAYED RELEASE ORAL DAILY
Status: DISCONTINUED | OUTPATIENT
Start: 2019-12-24 | End: 2019-12-26 | Stop reason: HOSPADM

## 2019-12-23 RX ORDER — CETIRIZINE HYDROCHLORIDE 10 MG/1
10 TABLET ORAL DAILY
Status: DISCONTINUED | OUTPATIENT
Start: 2019-12-24 | End: 2019-12-26 | Stop reason: HOSPADM

## 2019-12-23 RX ORDER — MORPHINE SULFATE 4 MG/ML
2 INJECTION, SOLUTION INTRAMUSCULAR; INTRAVENOUS EVERY 4 HOURS PRN
Status: DISCONTINUED | OUTPATIENT
Start: 2019-12-23 | End: 2019-12-26 | Stop reason: HOSPADM

## 2019-12-23 RX ORDER — OXYBUTYNIN CHLORIDE 5 MG/1
10 TABLET, EXTENDED RELEASE ORAL DAILY
Status: DISCONTINUED | OUTPATIENT
Start: 2019-12-24 | End: 2019-12-26 | Stop reason: HOSPADM

## 2019-12-23 RX ORDER — ACETAMINOPHEN 325 MG/1
650 TABLET ORAL EVERY 4 HOURS PRN
Status: DISCONTINUED | OUTPATIENT
Start: 2019-12-23 | End: 2019-12-26 | Stop reason: HOSPADM

## 2019-12-23 RX ORDER — CLINDAMYCIN HYDROCHLORIDE 150 MG/1
300 CAPSULE ORAL 4 TIMES DAILY
Qty: 80 CAPSULE | Refills: 0 | Status: SHIPPED | OUTPATIENT
Start: 2019-12-23 | End: 2019-12-23 | Stop reason: CLARIF

## 2019-12-23 RX ORDER — AMLODIPINE BESYLATE 10 MG/1
10 TABLET ORAL DAILY
Status: DISCONTINUED | OUTPATIENT
Start: 2019-12-24 | End: 2019-12-26 | Stop reason: HOSPADM

## 2019-12-23 RX ORDER — TRAMADOL HYDROCHLORIDE 50 MG/1
50 TABLET ORAL EVERY 6 HOURS PRN
Qty: 12 TABLET | Refills: 0 | Status: SHIPPED | OUTPATIENT
Start: 2019-12-23 | End: 2019-12-30

## 2019-12-23 RX ORDER — MORPHINE SULFATE 4 MG/ML
4 INJECTION, SOLUTION INTRAMUSCULAR; INTRAVENOUS
Status: COMPLETED | OUTPATIENT
Start: 2019-12-23 | End: 2019-12-23

## 2019-12-23 RX ORDER — DILTIAZEM HYDROCHLORIDE 120 MG/1
120 CAPSULE, COATED, EXTENDED RELEASE ORAL DAILY
Status: DISCONTINUED | OUTPATIENT
Start: 2019-12-24 | End: 2019-12-26 | Stop reason: HOSPADM

## 2019-12-23 RX ORDER — CLINDAMYCIN PHOSPHATE 900 MG/50ML
900 INJECTION, SOLUTION INTRAVENOUS
Status: DISCONTINUED | OUTPATIENT
Start: 2019-12-23 | End: 2019-12-25

## 2019-12-23 RX ORDER — OXYCODONE AND ACETAMINOPHEN 5; 325 MG/1; MG/1
1 TABLET ORAL EVERY 6 HOURS PRN
Status: DISCONTINUED | OUTPATIENT
Start: 2019-12-23 | End: 2019-12-26 | Stop reason: HOSPADM

## 2019-12-23 RX ORDER — ONDANSETRON 2 MG/ML
4 INJECTION INTRAMUSCULAR; INTRAVENOUS EVERY 4 HOURS PRN
Status: DISCONTINUED | OUTPATIENT
Start: 2019-12-23 | End: 2019-12-26 | Stop reason: HOSPADM

## 2019-12-23 RX ORDER — GEMFIBROZIL 600 MG/1
600 TABLET, FILM COATED ORAL DAILY
Status: DISCONTINUED | OUTPATIENT
Start: 2019-12-24 | End: 2019-12-26 | Stop reason: HOSPADM

## 2019-12-23 RX ORDER — LINEZOLID 2 MG/ML
600 INJECTION, SOLUTION INTRAVENOUS
Status: DISCONTINUED | OUTPATIENT
Start: 2019-12-24 | End: 2019-12-23

## 2019-12-23 RX ORDER — LINEZOLID 2 MG/ML
600 INJECTION, SOLUTION INTRAVENOUS
Status: COMPLETED | OUTPATIENT
Start: 2019-12-23 | End: 2019-12-23

## 2019-12-23 RX ORDER — CHOLECALCIFEROL (VITAMIN D3) 125 MCG
10000 CAPSULE ORAL DAILY
Status: DISCONTINUED | OUTPATIENT
Start: 2019-12-24 | End: 2019-12-23 | Stop reason: CLARIF

## 2019-12-23 RX ORDER — SENNOSIDES 8.6 MG/1
8.6 TABLET ORAL DAILY
Status: DISCONTINUED | OUTPATIENT
Start: 2019-12-24 | End: 2019-12-26 | Stop reason: HOSPADM

## 2019-12-23 RX ORDER — DULOXETIN HYDROCHLORIDE 30 MG/1
30 CAPSULE, DELAYED RELEASE ORAL DAILY
Status: DISCONTINUED | OUTPATIENT
Start: 2019-12-24 | End: 2019-12-26 | Stop reason: HOSPADM

## 2019-12-23 RX ORDER — TRAMADOL HYDROCHLORIDE 50 MG/1
50 TABLET ORAL
Status: DISPENSED | OUTPATIENT
Start: 2019-12-23 | End: 2019-12-24

## 2019-12-23 RX ORDER — ENOXAPARIN SODIUM 100 MG/ML
40 INJECTION SUBCUTANEOUS EVERY 24 HOURS
Status: DISCONTINUED | OUTPATIENT
Start: 2019-12-23 | End: 2019-12-26 | Stop reason: HOSPADM

## 2019-12-23 RX ORDER — ESCITALOPRAM OXALATE 10 MG/1
20 TABLET ORAL DAILY
Status: DISCONTINUED | OUTPATIENT
Start: 2019-12-24 | End: 2019-12-26 | Stop reason: HOSPADM

## 2019-12-23 RX ORDER — METOPROLOL TARTRATE 25 MG/1
25 TABLET, FILM COATED ORAL 2 TIMES DAILY
Status: DISCONTINUED | OUTPATIENT
Start: 2019-12-23 | End: 2019-12-26 | Stop reason: HOSPADM

## 2019-12-23 RX ORDER — LINEZOLID 600 MG/1
600 TABLET, FILM COATED ORAL EVERY 12 HOURS
Qty: 20 TABLET | Refills: 0 | Status: SHIPPED | OUTPATIENT
Start: 2019-12-23 | End: 2019-12-26 | Stop reason: SDUPTHER

## 2019-12-23 RX ORDER — FAMOTIDINE 20 MG/1
20 TABLET, FILM COATED ORAL 2 TIMES DAILY PRN
Status: DISCONTINUED | OUTPATIENT
Start: 2019-12-23 | End: 2019-12-26 | Stop reason: HOSPADM

## 2019-12-23 RX ORDER — KETOROLAC TROMETHAMINE 30 MG/ML
15 INJECTION, SOLUTION INTRAMUSCULAR; INTRAVENOUS EVERY 6 HOURS
Status: DISCONTINUED | OUTPATIENT
Start: 2019-12-24 | End: 2019-12-25

## 2019-12-23 RX ORDER — METHYLPREDNISOLONE 4 MG/1
TABLET ORAL
Qty: 1 PACKAGE | Refills: 0 | Status: SHIPPED | OUTPATIENT
Start: 2019-12-23 | End: 2019-12-23 | Stop reason: CLARIF

## 2019-12-23 RX ORDER — VENLAFAXINE HYDROCHLORIDE 75 MG/1
150 CAPSULE, EXTENDED RELEASE ORAL 2 TIMES DAILY
Status: DISCONTINUED | OUTPATIENT
Start: 2019-12-23 | End: 2019-12-23

## 2019-12-23 RX ORDER — TALC
6 POWDER (GRAM) TOPICAL NIGHTLY
Status: DISCONTINUED | OUTPATIENT
Start: 2019-12-23 | End: 2019-12-26 | Stop reason: HOSPADM

## 2019-12-23 RX ADMIN — CLINDAMYCIN IN 5 PERCENT DEXTROSE 900 MG: 18 INJECTION, SOLUTION INTRAVENOUS at 07:12

## 2019-12-23 RX ADMIN — METOPROLOL TARTRATE 25 MG: 25 TABLET ORAL at 09:12

## 2019-12-23 RX ADMIN — ONDANSETRON 4 MG: 4 TABLET, ORALLY DISINTEGRATING ORAL at 02:12

## 2019-12-23 RX ADMIN — MORPHINE SULFATE 2 MG: 4 INJECTION INTRAVENOUS at 07:12

## 2019-12-23 RX ADMIN — LINEZOLID 600 MG: 600 INJECTION, SOLUTION INTRAVENOUS at 03:12

## 2019-12-23 RX ADMIN — OXYCODONE HYDROCHLORIDE AND ACETAMINOPHEN 1 TABLET: 5; 325 TABLET ORAL at 09:12

## 2019-12-23 RX ADMIN — KETOROLAC TROMETHAMINE 15 MG: 30 INJECTION, SOLUTION INTRAMUSCULAR; INTRAVENOUS at 11:12

## 2019-12-23 RX ADMIN — RANOLAZINE 1000 MG: 500 TABLET, FILM COATED, EXTENDED RELEASE ORAL at 09:12

## 2019-12-23 RX ADMIN — ENOXAPARIN SODIUM 40 MG: 100 INJECTION SUBCUTANEOUS at 07:12

## 2019-12-23 RX ADMIN — MORPHINE SULFATE 4 MG: 4 INJECTION INTRAVENOUS at 02:12

## 2019-12-23 RX ADMIN — HYDROMORPHONE HYDROCHLORIDE 1 MG: 2 INJECTION INTRAMUSCULAR; INTRAVENOUS; SUBCUTANEOUS at 04:12

## 2019-12-23 RX ADMIN — Medication 6 MG: at 09:12

## 2019-12-23 RX ADMIN — IPRATROPIUM BROMIDE AND ALBUTEROL SULFATE 3 ML: .5; 3 SOLUTION RESPIRATORY (INHALATION) at 08:12

## 2019-12-23 NOTE — ED PROVIDER NOTES
SCRIBE #1 NOTE: I, Colleen Diaz, am scribing for, and in the presence of, EVAN Reyes. I have scribed the entire note.       History     Chief Complaint   Patient presents with    Hand Pain     c/o swelling, redness, pain to left wrist today. Pt denies any trauma.     Review of patient's allergies indicates:   Allergen Reactions    Doxycycline Nausea Only    Penicillins Anaphylaxis, Hives, Shortness Of Breath and Swelling    Oxycodone Other (See Comments)     sleepy  sleepy  sleepy  sleepy  sleepy      Adhesive Rash    Betadine [povidone-iodine] Rash     Pt confirms has completed CT w/ IV contrast-iodine without reaction or need for pre-medication.    Sulfa (sulfonamide antibiotics) Itching         History of Present Illness     HPI    12/23/2019, 12:10 PM  History obtained from the patient      History of Present Illness: Shireen Felix is a 74 y.o. female patient with a PMHx of DM, HTN, stroke, seizures, COPD, and CAD who presents to the Emergency Department for evaluation of L wrist pain which onset gradually earlier today. She notes worsening sxs over the last couple of hours. Pt denies any trauma or injury. Symptoms are constant and moderate in severity. No mitigating or exacerbating factors reported. Associated sxs include increased swelling and erythema. Patient denies any fever, chills, SOB, CP, n/v, dizziness, extremity weakness/numbness, and all other sxs at this time. No prior Tx. No further complaints or concerns at this time.       Arrival mode: Personal vehicle    PCP: POOL Chaudhari Jr, MD        Past Medical History:  Past Medical History:   Diagnosis Date    Abnormal ECG 8/5/2019    Aneurysm     Anticoagulant long-term use     Arthritis     CAD in native artery 8/5/2019    COPD (chronic obstructive pulmonary disease)     Diabetes mellitus     Glaucoma     Hypertension     Seizures     Shingles 05/27/2017    Stroke        Past Surgical History:  Past Surgical  History:   Procedure Laterality Date    BRAIN SURGERY      CARDIAC CATHETERIZATION      CORONARY ANGIOPLASTY      EPIDURAL STEROID INJECTION INTO LUMBAR SPINE Bilateral 11/12/2019    Procedure: TF XANDER L4/5;  Surgeon: Desean Dias MD;  Location: [facility]IN [facility];  Service: Pain Management;  Laterality: Bilateral;    HYSTERECTOMY      TRANSFORAMINAL EPIDURAL INJECTION OF STEROID Bilateral 7/23/2019    Procedure: Bilateral L3/4 Transforaminal Epidural Steroid Injection;  Surgeon: Desean Dias MD;  Location: [facility]IN [facility];  Service: Pain Management;  Laterality: Bilateral;         Family History:  Family History   Problem Relation Age of Onset    No Known Problems Mother     No Known Problems Father        Social History:  Social History     Tobacco Use    Smoking status: Former Smoker     Packs/day: 1.00     Years: 10.00     Pack years: 10.00     Types: Cigarettes     Last attempt to quit: 4/11/2004     Years since quitting: 15.7    Smokeless tobacco: Never Used   Substance and Sexual Activity    Alcohol use: No    Drug use: Never    Sexual activity: Not Currently        Review of Systems     Review of Systems   Constitutional: Negative for chills and fever.   HENT: Negative for sore throat.    Respiratory: Negative for cough and shortness of breath.    Cardiovascular: Negative for chest pain.   Gastrointestinal: Negative for abdominal pain, diarrhea, nausea and vomiting.   Genitourinary: Negative for dysuria.   Musculoskeletal: Positive for arthralgias (L wrist). Negative for back pain.   Skin: Negative for rash.        (+) increased swelling and erythema   Neurological: Negative for dizziness, weakness and numbness.   Hematological: Does not bruise/bleed easily.   All other systems reviewed and are negative.       Physical Exam     Initial Vitals [12/23/19 1146]   BP Pulse Resp Temp SpO2   (!) 149/72 82 16 97.8 °F (36.6 °C) 97 %      MAP       --          Physical Exam  Nursing Notes and Vital Signs  Reviewed.  Constitutional: Patient is in no acute distress. Well-developed and well-nourished.  Head: Atraumatic. Normocephalic.  Eyes: PERRL. EOM intact. Conjunctivae are not pale. No scleral icterus.  ENT: Mucous membranes are moist. Oropharynx is clear and symmetric.    Neck: Supple. Full ROM. No lymphadenopathy.  Cardiovascular: Regular rate. Regular rhythm. No murmurs, rubs, or gallops. Distal pulses are 2+ and symmetric.  Pulmonary/Chest: No respiratory distress. Clear to auscultation bilaterally. No wheezing or rales.  Abdominal: Soft and non-distended.  There is no tenderness.  No rebound, guarding, or rigidity.   Musculoskeletal: Moves all extremities. No obvious deformities. No edema.   Left Hand: No obvious deformity. L dorsal wrist swelling, erythema, and warmth. 7 cm streak to the volar aspect of the L forearm. Full flexion and extension of the wrist. Radial, median, and ulnar nerves are intact. Radial and ulnar pulses are 2+. Normal capillary refill.  Distal sensation is intact. NVI distally.   Skin: Warm and dry.  Neurological:  Alert, awake, and appropriate.  Normal speech.  No acute focal neurological deficits are appreciated.  Psychiatric: Normal affect. Good eye contact. Appropriate in content.     ED Course   Procedures  ED Vital Signs:  Vitals:    12/23/19 1146   BP: (!) 149/72   Pulse: 82   Resp: 16   Temp: 97.8 °F (36.6 °C)   SpO2: 97%       Abnormal Lab Results:  Labs Reviewed   CBC W/ AUTO DIFFERENTIAL - Abnormal; Notable for the following components:       Result Value    WBC 13.74 (*)     RBC 3.65 (*)     Hemoglobin 10.6 (*)     Hematocrit 34.4 (*)     Mean Corpuscular Hemoglobin Conc 30.8 (*)     RDW 15.2 (*)     Immature Granulocytes 0.9 (*)     Gran # (ANC) 10.5 (*)     Immature Grans (Abs) 0.12 (*)     Gran% 76.7 (*)     Lymph% 14.8 (*)     All other components within normal limits   BASIC METABOLIC PANEL - Abnormal; Notable for the following components:    Sodium 135 (*)     CO2 19  (*)     All other components within normal limits   C-REACTIVE PROTEIN - Abnormal; Notable for the following components:    CRP 11.9 (*)     All other components within normal limits   SEDIMENTATION RATE - Abnormal; Notable for the following components:    Sed Rate 70 (*)     All other components within normal limits   CULTURE, BLOOD   CULTURE, BLOOD   URIC ACID        All Lab Results:  Results for orders placed or performed during the hospital encounter of 12/23/19   CBC auto differential   Result Value Ref Range    WBC 13.74 (H) 3.90 - 12.70 K/uL    RBC 3.65 (L) 4.00 - 5.40 M/uL    Hemoglobin 10.6 (L) 12.0 - 16.0 g/dL    Hematocrit 34.4 (L) 37.0 - 48.5 %    Mean Corpuscular Volume 94 82 - 98 fL    Mean Corpuscular Hemoglobin 29.0 27.0 - 31.0 pg    Mean Corpuscular Hemoglobin Conc 30.8 (L) 32.0 - 36.0 g/dL    RDW 15.2 (H) 11.5 - 14.5 %    Platelets 348 150 - 350 K/uL    MPV 9.4 9.2 - 12.9 fL    Immature Granulocytes 0.9 (H) 0.0 - 0.5 %    Gran # (ANC) 10.5 (H) 1.8 - 7.7 K/uL    Immature Grans (Abs) 0.12 (H) 0.00 - 0.04 K/uL    Lymph # 2.0 1.0 - 4.8 K/uL    Mono # 0.9 0.3 - 1.0 K/uL    Eos # 0.1 0.0 - 0.5 K/uL    Baso # 0.02 0.00 - 0.20 K/uL    nRBC 0 0 /100 WBC    Gran% 76.7 (H) 38.0 - 73.0 %    Lymph% 14.8 (L) 18.0 - 48.0 %    Mono% 6.8 4.0 - 15.0 %    Eosinophil% 0.7 0.0 - 8.0 %    Basophil% 0.1 0.0 - 1.9 %    Differential Method Automated    Basic metabolic panel   Result Value Ref Range    Sodium 135 (L) 136 - 145 mmol/L    Potassium 4.1 3.5 - 5.1 mmol/L    Chloride 104 95 - 110 mmol/L    CO2 19 (L) 23 - 29 mmol/L    Glucose 93 70 - 110 mg/dL    BUN, Bld 11 8 - 23 mg/dL    Creatinine 0.8 0.5 - 1.4 mg/dL    Calcium 10.1 8.7 - 10.5 mg/dL    Anion Gap 12 8 - 16 mmol/L    eGFR if African American >60 >60 mL/min/1.73 m^2    eGFR if non African American >60 >60 mL/min/1.73 m^2   Uric acid   Result Value Ref Range    Uric Acid 2.7 2.4 - 5.7 mg/dL   C-reactive protein   Result Value Ref Range    CRP 11.9 (H) 0.0 - 8.2  mg/L   Sedimentation rate   Result Value Ref Range    Sed Rate 70 (H) 0 - 20 mm/Hr       Imaging Results:  Imaging Results          X-Ray Wrist Complete Left (Final result)  Result time 12/23/19 12:25:33    Final result by Kristopher Avila MD (12/23/19 12:25:33)                 Impression:      Osteoarthritis.  No acute bony abnormality      Electronically signed by: Kristopher Avila  Date:    12/23/2019  Time:    12:25             Narrative:    EXAMINATION:  XR WRIST COMPLETE 3 VIEWS LEFT    CLINICAL HISTORY:  Pain in unspecified wrist    TECHNIQUE:  Standard radiography performed.    COMPARISON:  None    FINDINGS:  Advanced osteoarthritis involving the 1st metacarpophalangeal joint.  Mild osteoarthritis involving the radiocarpal compartment.  No acute fracture or dislocation.                                        The Emergency Provider reviewed the vital signs and test results, which are outlined above.     ED Discussion     2:46 PM: Reassessed pt at this time. Discussed with pt all pertinent ED information and results. Discussed pt dx and plan of tx.     3:22 PM: Discussed case with Bridger Villarreal NP ([facility]). Dr. Solano agrees with current care and management of pt and accepts admission.   Admitting Service: [facility]  Admitting Physician: Dr. Solano  Admit to: Observation    3:22 PM: Re-evaluated pt. I have discussed test results, shared treatment plan, and the need for admission with patient and family at bedside. Pt and family express understanding at this time and agree with all information. All questions answered. Pt and family have no further questions or concerns at this time. Pt is ready for admit.         Medical Decision Making:   Clinical Tests:   Lab Tests: Ordered and Reviewed  Radiological Study: Ordered and Reviewed           ED Medication(s):  Medications   traMADol tablet 50 mg (0 mg Oral Hold 12/23/19 1215)   linezolid 600mg/300ml 600 mg/300 mL IVPB 600 mg (has no administration in time  range)   morphine injection 4 mg (4 mg Intravenous Given 12/23/19 1449)   ondansetron disintegrating tablet 4 mg (4 mg Oral Given 12/23/19 1452)       New Prescriptions    LINEZOLID (ZYVOX) 600 MG TAB    Take 1 tablet (600 mg total) by mouth every 12 (twelve) hours. for 10 days    TRAMADOL (ULTRAM) 50 MG TABLET    Take 1 tablet (50 mg total) by mouth every 6 (six) hours as needed.       Follow-up Information     E Brody Chaudhari Jr, MD. Call in 2 days.    Specialties:  Internal Medicine, Pediatrics  Contact information:  [id number]  [place] [address]  [phone]                       Scribe Attestation:   Scribe #1: I performed the above scribed service and the documentation accurately describes the services I performed. I attest to the accuracy of the note.     Attending:   Physician Attestation Statement for Scribe #1: I, EVAN Reyes, personally performed the services described in this documentation, as scribed by Colleen Diaz, in my presence, and it is both accurate and complete.           Clinical Impression       ICD-10-CM ICD-9-CM   1. Cellulitis of left wrist L03.114 682.4   2. Wrist pain M25.539 719.43   3. Wrist pain, left M25.532 719.43   4. Acute bacterial lymphangitis L03.91 682.9    B96.89 041.89       Disposition:   Disposition: Placed in Observation  Condition: Stable       EVAN Hathaway  12/23/19 1537       EVAN Hathaway  12/23/19 153

## 2019-12-23 NOTE — HPI
This is a 74 y.o. female patient with a PMHx of DM, HTN, stroke, seizures, COPD, and CAD who presents to the Emergency Department for evaluation of left wrist pain which onset gradually earlier today. She notes worsening symptoms over the last couple of hours.  Patient denies any trauma or injury. Symptoms are constant and moderate in severity. No mitigating or exacerbating factors reported. Associated symptoms include increased swelling and erythema of left wrist. Patient denies any fever, chills, shortness of breath, chest pain, nausea, vomiting, dizziness, extremity weakness/numbness, or any other sx symptoms at this time. No prior treatment. No further complaints or concerns at this time.     Patient reports chronic intermittent  left wrist pain which she attributes to arthritis. She states this morning all of a sudden there was new onset redness, warmth, and swelling. Patient evaluated in ER and noted to have left wrist cellulitis.  Patient reports she has a history of staph infection in past and has been placed in the hospital for further evaluation treatment.  Uric acid level checked in the emergency room was within normal limits.

## 2019-12-23 NOTE — TELEPHONE ENCOUNTER
----- Message from Kecia Stone sent at 12/23/2019 10:45 AM CST -----  Contact: Rm (brother)  Caller states he recevied a missed call regarding pt. ([phone])

## 2019-12-23 NOTE — SUBJECTIVE & OBJECTIVE
Past Medical History:   Diagnosis Date    Abnormal ECG 8/5/2019    Aneurysm     Anticoagulant long-term use     Arthritis     CAD in native artery 8/5/2019    COPD (chronic obstructive pulmonary disease)     Diabetes mellitus     Glaucoma     Hypertension     Seizures     Shingles 05/27/2017    Stroke        Past Surgical History:   Procedure Laterality Date    BRAIN SURGERY      CARDIAC CATHETERIZATION      CORONARY ANGIOPLASTY      EPIDURAL STEROID INJECTION INTO LUMBAR SPINE Bilateral 11/12/2019    Procedure: TF XANDER L4/5;  Surgeon: Desean Dias MD;  Location: [facility];  Service: Pain Management;  Laterality: Bilateral;    HYSTERECTOMY      TRANSFORAMINAL EPIDURAL INJECTION OF STEROID Bilateral 7/23/2019    Procedure: Bilateral L3/4 Transforaminal Epidural Steroid Injection;  Surgeon: Desean Dias MD;  Location: [facility];  Service: Pain Management;  Laterality: Bilateral;       Review of patient's allergies indicates:   Allergen Reactions    Doxycycline Nausea Only    Penicillins Anaphylaxis, Hives, Shortness Of Breath and Swelling    Oxycodone Other (See Comments)     sleepy  sleepy  sleepy  sleepy  sleepy      Adhesive Rash    Betadine [povidone-iodine] Rash     Pt confirms has completed CT w/ IV contrast-iodine without reaction or need for pre-medication.    Sulfa (sulfonamide antibiotics) Itching       No current facility-administered medications on file prior to encounter.      Current Outpatient Medications on File Prior to Encounter   Medication Sig    albuterol (PROVENTIL/VENTOLIN HFA) 90 mcg/actuation inhaler Inhale 2 puffs into the lungs every 4 (four) hours as needed.    albuterol-ipratropium (DUO-NEB) 2.5 mg-0.5 mg/3 mL nebulizer solution Take 3 mLs by nebulization every 6 (six) hours as needed for Wheezing. Rescue    amLODIPine (NORVASC) 10 MG tablet Take 1 tablet (10 mg total) by mouth once daily.    cholecalciferol, vitamin D3, (VITAMIN D3) 1,000 unit  capsule Take 10 capsules (10,000 Units total) by mouth once daily. Twice weekly    clopidogrel (PLAVIX) 75 mg tablet Take 1 tablet (75 mg total) by mouth once daily.    denosumab (PROLIA) 60 mg/mL Syrg every 6 (six) months.    dexlansoprazole (DEXILANT) 60 mg capsule Take 1 capsule (60 mg total) by mouth once daily.    diltiaZEM (CARDIZEM LA) 120 mg 24 hr tablet Take 1 tablet (120 mg total) by mouth once daily.    DULoxetine (CYMBALTA) 30 MG capsule Take 1 capsule (30 mg total) by mouth once daily.    escitalopram oxalate (LEXAPRO) 20 MG tablet Take 1 tablet (20 mg total) by mouth once daily.    eslicarbazepine (APTIOM) 600 mg Tab Take 1 tablet by mouth every evening.    fluticasone furoate-vilanterol (BREO ELLIPTA) 200-25 mcg/dose DsDv diskus inhaler Inhale 1 puff into the lungs every evening.    gemfibrozil (LOPID) 600 MG tablet Take 1 tablet (600 mg total) by mouth once daily.    GI cocktail (mylanta 30 mL, lidocaine 2 % viscous 10 mL, dicyclomine 10 mL) 50 mL Take 50 mLs by mouth 2 (two) times daily.    Lactobacillus acidophilus (PROBIOTIC ACIDOPHILUS ORAL) Take 1 capsule by mouth 2 (two) times daily.    loratadine (CLARITIN) 10 mg tablet Take 1 tablet by mouth once daily.    metFORMIN (GLUCOPHAGE) 500 MG tablet Take 1 tablet (500 mg total) by mouth 2 (two) times daily.    metoprolol tartrate (LOPRESSOR) 25 MG tablet TAKE 1 TABLET BY MOUTH TWICE DAILY WITH FOOD FOR HEART AND BLOOD PRESSURE    oxyCODONE-acetaminophen (PERCOCET) 5-325 mg per tablet Take 1 tablet by mouth every 6 (six) hours as needed for Pain. (Patient not taking: Reported on 12/3/2019)    ranitidine (ZANTAC) 150 MG tablet TAKE 1 TABLET BY MOUTH TWICE DAILY STOMACH ACID    ranolazine (RANEXA) 1,000 mg Tb12 Take 1 tablet (1,000 mg total) by mouth 2 (two) times daily.    sennosides (SENNA) 8.6 mg Cap Take by mouth as needed.    temazepam (RESTORIL) 15 mg Cap Take 1 capsule (15 mg total) by mouth every evening.    tiotropium  (SPIRIVA) 18 mcg inhalation capsule Inhale 1 capsule (18 mcg total) into the lungs once daily.    tolterodine (DETROL LA) 4 MG 24 hr capsule Take 4 mg by mouth once daily.    valsartan (DIOVAN) 320 MG tablet Take 1 tablet (320 mg total) by mouth once daily.    venlafaxine 150 mg TR24 Take 150 mg by mouth once daily.     Family History     Problem Relation (Age of Onset)    No Known Problems Mother, Father        Tobacco Use    Smoking status: Former Smoker     Packs/day: 1.00     Years: 10.00     Pack years: 10.00     Types: Cigarettes     Last attempt to quit: 4/11/2004     Years since quitting: 15.7    Smokeless tobacco: Never Used   Substance and Sexual Activity    Alcohol use: No    Drug use: Never    Sexual activity: Not Currently     Review of Systems   Constitutional: Positive for activity change. Negative for appetite change, chills, diaphoresis, fatigue and fever.   HENT: Negative for ear discharge and facial swelling.    Eyes: Negative for pain and redness.   Respiratory: Negative for cough and shortness of breath.    Cardiovascular: Negative for chest pain, palpitations and leg swelling.   Gastrointestinal: Negative for abdominal distention, abdominal pain, blood in stool, constipation, diarrhea, nausea and vomiting.   Endocrine: Negative for polydipsia and polyphagia.   Genitourinary: Negative for difficulty urinating, dysuria, flank pain and hematuria.   Musculoskeletal: Positive for arthralgias and gait problem. Negative for neck pain and neck stiffness.        Left wrist  Patient reports chronic pain from multiple joints secondary to arthritis   Allergic/Immunologic: Negative for food allergies.   Neurological: Positive for weakness. Negative for seizures, facial asymmetry and speech difficulty.        Stiff joints  General debility   Hematological: Does not bruise/bleed easily.   Psychiatric/Behavioral: Negative for agitation, behavioral problems, confusion, hallucinations and suicidal ideas.  The patient is not nervous/anxious.      Objective:     Vital Signs (Most Recent):  Temp: 97.8 °F (36.6 °C) (12/23/19 1146)  Pulse: 82 (12/23/19 1600)  Resp: 20 (12/23/19 1457)  BP: (!) 159/79 (12/23/19 1600)  SpO2: 100 % (12/23/19 1600) Vital Signs (24h Range):  Temp:  [97.8 °F (36.6 °C)] 97.8 °F (36.6 °C)  Pulse:  [80-82] 82  Resp:  [16-20] 20  SpO2:  [97 %-100 %] 100 %  BP: (149-161)/(71-79) 159/79        There is no height or weight on file to calculate BMI.    Physical Exam   Constitutional: She is oriented to person, place, and time. She appears well-developed. No distress.   Frail elderly   HENT:   Head: Normocephalic and atraumatic.   Eyes: Pupils are equal, round, and reactive to light. Conjunctivae and EOM are normal. Right eye exhibits no discharge. Left eye exhibits no discharge.   Neck: Normal range of motion. Neck supple. No JVD present.   Cardiovascular: Normal rate, regular rhythm and intact distal pulses.   Pulmonary/Chest: No respiratory distress.   Bilateral breath sounds diminished   Abdominal: Soft. Bowel sounds are normal. She exhibits no distension. There is no tenderness. There is no guarding.   Genitourinary:   Genitourinary Comments: Not examined   Musculoskeletal: She exhibits edema and tenderness.   Stiff joints  Left wrist pain with range of motion  Edema and erythema of  left wrist  Tenderness left wrist   Neurological: She is alert and oriented to person, place, and time. No cranial nerve deficit.   Skin: Skin is warm and dry. Capillary refill takes less than 2 seconds. She is not diaphoretic. There is erythema.   Left wrist   Psychiatric: She has a normal mood and affect. Her behavior is normal. Judgment and thought content normal.         CRANIAL NERVES     CN III, IV, VI   Pupils are equal, round, and reactive to light.  Extraocular motions are normal.        Significant Labs: Reviewed   Uric acid level ordered    Significant Imaging: Reviewed

## 2019-12-23 NOTE — ED NOTES
Pt states that the pain medication did not help at all. She is still having the same intensity of pain as she had before. Pt is tearful. She currently has a warm compress applied to her wrist.

## 2019-12-23 NOTE — TELEPHONE ENCOUNTER
----- Message from Shayy Martinez sent at 12/23/2019  9:56 AM CST -----  Contact: pt brother   .Type:  Same Day Appointment Request    Caller is requesting a same day appointment.  Caller declined first available appointment listed below.    Name of Caller: pt brother  When is the first available appointment? No solutions found   Symptoms: pain in hand  Best Call Back Number: [phone] (home)      Additional Information:

## 2019-12-23 NOTE — TELEPHONE ENCOUNTER
Spoke with pt and pt stated her hand was swollen and warm. Informed pt per  to go to ER and we would consult with our ortho team for f/u after ER visit. Pt understood. All questions answered.

## 2019-12-23 NOTE — ED NOTES
2X failed attempt at getting blood cultures, Nurse notified, Sis CLARK. Attempted with 1x failed attempts. Called lab and spoke to Mary to send a phlebotomist to come and get them. Mary stated none are available at this time but she will pass the message on to the phlebotomist.

## 2019-12-24 PROBLEM — R94.4 DECREASED GFR: Status: ACTIVE | Noted: 2019-12-24

## 2019-12-24 LAB
ALBUMIN SERPL BCP-MCNC: 2.6 G/DL (ref 3.5–5.2)
ALP SERPL-CCNC: 69 U/L (ref 55–135)
ALT SERPL W/O P-5'-P-CCNC: 8 U/L (ref 10–44)
ANION GAP SERPL CALC-SCNC: 11 MMOL/L (ref 8–16)
AST SERPL-CCNC: 12 U/L (ref 10–40)
BASOPHILS # BLD AUTO: 0.02 K/UL (ref 0–0.2)
BASOPHILS NFR BLD: 0.2 % (ref 0–1.9)
BILIRUB SERPL-MCNC: 0.3 MG/DL (ref 0.1–1)
BUN SERPL-MCNC: 17 MG/DL (ref 8–23)
CALCIUM SERPL-MCNC: 9.2 MG/DL (ref 8.7–10.5)
CHLORIDE SERPL-SCNC: 103 MMOL/L (ref 95–110)
CO2 SERPL-SCNC: 17 MMOL/L (ref 23–29)
CREAT SERPL-MCNC: 1.3 MG/DL (ref 0.5–1.4)
DIFFERENTIAL METHOD: ABNORMAL
EOSINOPHIL # BLD AUTO: 0.1 K/UL (ref 0–0.5)
EOSINOPHIL NFR BLD: 0.4 % (ref 0–8)
ERYTHROCYTE [DISTWIDTH] IN BLOOD BY AUTOMATED COUNT: 15.1 % (ref 11.5–14.5)
EST. GFR  (AFRICAN AMERICAN): 46 ML/MIN/1.73 M^2
EST. GFR  (NON AFRICAN AMERICAN): 40 ML/MIN/1.73 M^2
GLUCOSE SERPL-MCNC: 111 MG/DL (ref 70–110)
HCT VFR BLD AUTO: 29.2 % (ref 37–48.5)
HGB BLD-MCNC: 8.8 G/DL (ref 12–16)
IMM GRANULOCYTES # BLD AUTO: 0.1 K/UL (ref 0–0.04)
IMM GRANULOCYTES NFR BLD AUTO: 0.9 % (ref 0–0.5)
LYMPHOCYTES # BLD AUTO: 2.3 K/UL (ref 1–4.8)
LYMPHOCYTES NFR BLD: 19.6 % (ref 18–48)
MAGNESIUM SERPL-MCNC: 1.4 MG/DL (ref 1.6–2.6)
MCH RBC QN AUTO: 28.5 PG (ref 27–31)
MCHC RBC AUTO-ENTMCNC: 30.1 G/DL (ref 32–36)
MCV RBC AUTO: 95 FL (ref 82–98)
MONOCYTES # BLD AUTO: 1.1 K/UL (ref 0.3–1)
MONOCYTES NFR BLD: 9.1 % (ref 4–15)
NEUTROPHILS # BLD AUTO: 8.1 K/UL (ref 1.8–7.7)
NEUTROPHILS NFR BLD: 69.8 % (ref 38–73)
NRBC BLD-RTO: 0 /100 WBC
PLATELET # BLD AUTO: 283 K/UL (ref 150–350)
PMV BLD AUTO: 9.6 FL (ref 9.2–12.9)
POCT GLUCOSE: 140 MG/DL (ref 70–110)
POCT GLUCOSE: 90 MG/DL (ref 70–110)
POCT GLUCOSE: 92 MG/DL (ref 70–110)
POTASSIUM SERPL-SCNC: 4 MMOL/L (ref 3.5–5.1)
PROT SERPL-MCNC: 6.6 G/DL (ref 6–8.4)
RBC # BLD AUTO: 3.09 M/UL (ref 4–5.4)
SODIUM SERPL-SCNC: 131 MMOL/L (ref 136–145)
WBC # BLD AUTO: 11.61 K/UL (ref 3.9–12.7)

## 2019-12-24 PROCEDURE — 85025 COMPLETE CBC W/AUTO DIFF WBC: CPT

## 2019-12-24 PROCEDURE — 63600175 PHARM REV CODE 636 W HCPCS: Performed by: NURSE PRACTITIONER

## 2019-12-24 PROCEDURE — 25000242 PHARM REV CODE 250 ALT 637 W/ HCPCS: Performed by: INTERNAL MEDICINE

## 2019-12-24 PROCEDURE — 25000003 PHARM REV CODE 250: Performed by: NURSE PRACTITIONER

## 2019-12-24 PROCEDURE — 25000003 PHARM REV CODE 250: Performed by: INTERNAL MEDICINE

## 2019-12-24 PROCEDURE — 36415 COLL VENOUS BLD VENIPUNCTURE: CPT

## 2019-12-24 PROCEDURE — 11000001 HC ACUTE MED/SURG PRIVATE ROOM

## 2019-12-24 PROCEDURE — 97530 THERAPEUTIC ACTIVITIES: CPT | Performed by: PHYSICAL THERAPIST

## 2019-12-24 PROCEDURE — 83735 ASSAY OF MAGNESIUM: CPT

## 2019-12-24 PROCEDURE — 80053 COMPREHEN METABOLIC PANEL: CPT

## 2019-12-24 PROCEDURE — S0077 INJECTION, CLINDAMYCIN PHOSP: HCPCS | Performed by: NURSE PRACTITIONER

## 2019-12-24 PROCEDURE — 97166 OT EVAL MOD COMPLEX 45 MIN: CPT | Performed by: PHYSICAL THERAPIST

## 2019-12-24 PROCEDURE — 63600175 PHARM REV CODE 636 W HCPCS: Performed by: INTERNAL MEDICINE

## 2019-12-24 PROCEDURE — 97116 GAIT TRAINING THERAPY: CPT

## 2019-12-24 PROCEDURE — 97162 PT EVAL MOD COMPLEX 30 MIN: CPT

## 2019-12-24 PROCEDURE — 96376 TX/PRO/DX INJ SAME DRUG ADON: CPT | Performed by: PHYSICIAN ASSISTANT

## 2019-12-24 PROCEDURE — 96375 TX/PRO/DX INJ NEW DRUG ADDON: CPT | Mod: 59 | Performed by: PHYSICIAN ASSISTANT

## 2019-12-24 PROCEDURE — 94760 N-INVAS EAR/PLS OXIMETRY 1: CPT

## 2019-12-24 PROCEDURE — 94640 AIRWAY INHALATION TREATMENT: CPT

## 2019-12-24 RX ORDER — MAGNESIUM SULFATE 1 G/100ML
1 INJECTION INTRAVENOUS ONCE
Status: COMPLETED | OUTPATIENT
Start: 2019-12-24 | End: 2019-12-24

## 2019-12-24 RX ORDER — SODIUM CHLORIDE 9 MG/ML
INJECTION, SOLUTION INTRAVENOUS CONTINUOUS
Status: DISCONTINUED | OUTPATIENT
Start: 2019-12-24 | End: 2019-12-25

## 2019-12-24 RX ORDER — IPRATROPIUM BROMIDE AND ALBUTEROL SULFATE 2.5; .5 MG/3ML; MG/3ML
3 SOLUTION RESPIRATORY (INHALATION)
Status: DISCONTINUED | OUTPATIENT
Start: 2019-12-24 | End: 2019-12-26 | Stop reason: HOSPADM

## 2019-12-24 RX ORDER — IBUPROFEN 200 MG
16 TABLET ORAL
Status: DISCONTINUED | OUTPATIENT
Start: 2019-12-24 | End: 2019-12-26 | Stop reason: HOSPADM

## 2019-12-24 RX ORDER — IBUPROFEN 200 MG
24 TABLET ORAL
Status: DISCONTINUED | OUTPATIENT
Start: 2019-12-24 | End: 2019-12-26 | Stop reason: HOSPADM

## 2019-12-24 RX ORDER — GLUCAGON 1 MG
1 KIT INJECTION
Status: DISCONTINUED | OUTPATIENT
Start: 2019-12-24 | End: 2019-12-26 | Stop reason: HOSPADM

## 2019-12-24 RX ORDER — INSULIN ASPART 100 [IU]/ML
0-5 INJECTION, SOLUTION INTRAVENOUS; SUBCUTANEOUS
Status: DISCONTINUED | OUTPATIENT
Start: 2019-12-24 | End: 2019-12-26 | Stop reason: HOSPADM

## 2019-12-24 RX ADMIN — PANTOPRAZOLE SODIUM 40 MG: 40 TABLET, DELAYED RELEASE ORAL at 08:12

## 2019-12-24 RX ADMIN — LACTOBACILLUS TAB 1 TABLET: TAB at 08:12

## 2019-12-24 RX ADMIN — KETOROLAC TROMETHAMINE 15 MG: 30 INJECTION, SOLUTION INTRAMUSCULAR; INTRAVENOUS at 05:12

## 2019-12-24 RX ADMIN — ENOXAPARIN SODIUM 40 MG: 100 INJECTION SUBCUTANEOUS at 05:12

## 2019-12-24 RX ADMIN — THERA TABS 1 TABLET: TAB at 08:12

## 2019-12-24 RX ADMIN — DULOXETINE HYDROCHLORIDE 30 MG: 30 CAPSULE, DELAYED RELEASE ORAL at 08:12

## 2019-12-24 RX ADMIN — ESLICARBAZEPINE ACETATE 600 MG: 200 TABLET ORAL at 09:12

## 2019-12-24 RX ADMIN — OXYCODONE HYDROCHLORIDE AND ACETAMINOPHEN 1 TABLET: 5; 325 TABLET ORAL at 09:12

## 2019-12-24 RX ADMIN — CLINDAMYCIN IN 5 PERCENT DEXTROSE 900 MG: 18 INJECTION, SOLUTION INTRAVENOUS at 10:12

## 2019-12-24 RX ADMIN — METOPROLOL TARTRATE 25 MG: 25 TABLET ORAL at 09:12

## 2019-12-24 RX ADMIN — CLINDAMYCIN IN 5 PERCENT DEXTROSE 900 MG: 18 INJECTION, SOLUTION INTRAVENOUS at 07:12

## 2019-12-24 RX ADMIN — CLOPIDOGREL BISULFATE 75 MG: 75 TABLET ORAL at 08:12

## 2019-12-24 RX ADMIN — GEMFIBROZIL 600 MG: 600 TABLET ORAL at 08:12

## 2019-12-24 RX ADMIN — IPRATROPIUM BROMIDE AND ALBUTEROL SULFATE 3 ML: .5; 3 SOLUTION RESPIRATORY (INHALATION) at 11:12

## 2019-12-24 RX ADMIN — CLINDAMYCIN IN 5 PERCENT DEXTROSE 900 MG: 18 INJECTION, SOLUTION INTRAVENOUS at 03:12

## 2019-12-24 RX ADMIN — MAGNESIUM SULFATE 1 G: 1 INJECTION INTRAVENOUS at 09:12

## 2019-12-24 RX ADMIN — MORPHINE SULFATE 2 MG: 4 INJECTION INTRAVENOUS at 12:12

## 2019-12-24 RX ADMIN — IPRATROPIUM BROMIDE AND ALBUTEROL SULFATE 3 ML: .5; 3 SOLUTION RESPIRATORY (INHALATION) at 07:12

## 2019-12-24 RX ADMIN — IPRATROPIUM BROMIDE AND ALBUTEROL SULFATE 3 ML: .5; 3 SOLUTION RESPIRATORY (INHALATION) at 03:12

## 2019-12-24 RX ADMIN — RANOLAZINE 1000 MG: 500 TABLET, FILM COATED, EXTENDED RELEASE ORAL at 09:12

## 2019-12-24 RX ADMIN — LACTOBACILLUS TAB 1 TABLET: TAB at 05:12

## 2019-12-24 RX ADMIN — SODIUM CHLORIDE: 0.9 INJECTION, SOLUTION INTRAVENOUS at 10:12

## 2019-12-24 RX ADMIN — METHYLPREDNISOLONE SODIUM SUCCINATE 40 MG: 40 INJECTION, POWDER, FOR SOLUTION INTRAMUSCULAR; INTRAVENOUS at 05:12

## 2019-12-24 RX ADMIN — Medication 6 MG: at 09:12

## 2019-12-24 RX ADMIN — OXYBUTYNIN CHLORIDE 10 MG: 5 TABLET, EXTENDED RELEASE ORAL at 08:12

## 2019-12-24 RX ADMIN — ESCITALOPRAM OXALATE 20 MG: 10 TABLET ORAL at 08:12

## 2019-12-24 RX ADMIN — SENNOSIDES 8.6 MG: 8.6 TABLET, FILM COATED ORAL at 08:12

## 2019-12-24 RX ADMIN — KETOROLAC TROMETHAMINE 15 MG: 30 INJECTION, SOLUTION INTRAMUSCULAR; INTRAVENOUS at 11:12

## 2019-12-24 RX ADMIN — MORPHINE SULFATE 2 MG: 4 INJECTION INTRAVENOUS at 01:12

## 2019-12-24 NOTE — ASSESSMENT & PLAN NOTE
Diabetic diet  Accu-Cheks with correctional sliding scale insulin  Home metformin remains on hold

## 2019-12-24 NOTE — PLAN OF CARE
Patient remains free of falls and safety precautions maintained. Afebrile. Pain controlled. Monitoring BP. IV fluids and abx per order. Swelling and redness noted to left wrist. Will continue to monitor. 12 hour chart check.

## 2019-12-24 NOTE — ASSESSMENT & PLAN NOTE
Secondary to cellulitis and trauma   -pt states she hit her left wrist on a wall Sunday night with symptoms presenting yesterday morning  Left wrist x-ray shows no signs of fracture or acute abnormality  Patient denies trauma  -analgesia prn

## 2019-12-24 NOTE — PT/OT/SLP EVAL
Occupational Therapy   Evaluation    Name: Shireen Felix  MRN: [id number]  Admitting Diagnosis:  Cellulitis of left wrist      Recommendations:     Discharge Recommendations: home health PT  Discharge Equipment Recommendations:  none  Barriers to discharge:       Assessment:     Shireen Felix is a 74 y.o. female with a medical diagnosis of Cellulitis of left wrist.  She presents with impaired functional mobility and ADLs. Performance deficits affecting function: weakness, impaired endurance, impaired self care skills, impaired functional mobilty, decreased coordination, edema, impaired skin, pain, decreased safety awareness, impaired balance, decreased lower extremity function, decreased upper extremity function, gait instability, decreased ROM.      Rehab Prognosis: Good; patient would benefit from acute skilled OT services to address these deficits and reach maximum level of function.       Plan:     Patient to be seen 3 x/week to address the above listed problems via self-care/home management, community/work re-entry, therapeutic activities  · Plan of Care Expires: 12/31/19  · Plan of Care Reviewed with: patient    Subjective     Chief Complaint: (L) wrist pain  Patient/Family Comments/goals: improve ROM (L) wrist    Occupational Profile:  Living Environment: lives alone in 1 story house with ramp  Previous level of function: (A) with ADLs, using RW with Ambulation with Therapist  Roles and Routines: does not drive; goes to Outpatient therapy  Equipment Used at Home:  wheelchair, walker, rolling, bedside commode, shower chair  Assistance upon Discharge: family    Pain/Comfort:  · Pain Rating 1: 9/10  · Location - Side 1: Left  · Location 1: hand  · Pain Addressed 1: Pre-medicate for activity  · Pain Rating Post-Intervention 1: 9/10    Patients cultural, spiritual, Buddhist conflicts given the current situation: no    Objective:     Communicated with: Nurse Montes De Oca and [facility] chart review prior to  session.  Patient found supine with peripheral IV upon OT entry to room.    General Precautions: Standard, fall   Orthopedic Precautions:N/A   Braces: N/A     Occupational Performance:    Bed Mobility:    · Patient completed Rolling/Turning to Left with  stand by assistance  · Patient completed Rolling/Turning to Right with stand by assistance  · Patient completed Scooting/Bridging with contact guard assistance  · Patient completed Supine to Sit with contact guard assistance  · Patient completed Sit to Supine with contact guard assistance    Functional Mobility/Transfers:  · Patient completed Sit <> Stand Transfer with minimum assistance  with  rolling walker   · Patient completed Bed <> Chair Transfer using Step Transfer technique with minimum assistance with rolling walker  · Functional Mobility: ~10ft using RW with Min A    Activities of Daily Living:  · Upper Body Dressing: minimum assistance .  · Lower Body Dressing: minimum assistance .    Cognitive/Visual Perceptual:  Cognitive/Psychosocial Skills:     -       Oriented to: Person, Place, Time and Situation   -       Follows Commands/attention:Follows two-step commands  -       Communication: clear/fluent  -       Memory: Poor immediate recall  -       Safety awareness/insight to disability: impaired   Visual/Perceptual:      -Intact .    Physical Exam:  Dominant hand:    -       left  Upper Extremity Range of Motion:     -       Right Upper Extremity: WFL except decreased at shoulder  -       Left Upper Extremity: WFL except decreased at wrist  Upper Extremity Strength:    -       Right Upper Extremity: 4/5 grossly  -       Left Upper Extremity: 4/5 grossly; unable to test secondary to pain   Strength:    -       Right Upper Extremity: WNL  -       Left Upper Extremity: decreased    AMPAC 6 Click ADL:  AMPAC Total Score: 20    Treatment & Education:  OT eval completed as listed above. Pt educated in role of OT and POC.   Education:    Patient left HOB  elevated with all lines intact, call button in reach, Nurse Falguni notified and set up to eat breakfast    GOALS:   Multidisciplinary Problems     Occupational Therapy Goals        Problem: Occupational Therapy Goal    Goal Priority Disciplines Outcome Interventions   Occupational Therapy Goal     OT, PT/OT     Description:  LTGs to be met by 12/31/19:  1. Pt will perform toilet t/f with Supervision  2. Pt will perform LE dressing with SBA  3. Pt will perform UE dressing with Supervision   4. Pt will perform (B) UE ROM exercises 1 x 20 reps                    History:     Past Medical History:   Diagnosis Date    Abnormal ECG 8/5/2019    Aneurysm     Anticoagulant long-term use     Arthritis     CAD in native artery 8/5/2019    COPD (chronic obstructive pulmonary disease)     Diabetes mellitus     Glaucoma     Hypertension     Seizures     Shingles 05/27/2017    Stroke        Past Surgical History:   Procedure Laterality Date    BRAIN SURGERY      CARDIAC CATHETERIZATION      CORONARY ANGIOPLASTY      EPIDURAL STEROID INJECTION INTO LUMBAR SPINE Bilateral 11/12/2019    Procedure: TF XANDER L4/5;  Surgeon: Desean Dias MD;  Location: [facility]IN [facility]GT;  Service: Pain Management;  Laterality: Bilateral;    HYSTERECTOMY      TRANSFORAMINAL EPIDURAL INJECTION OF STEROID Bilateral 7/23/2019    Procedure: Bilateral L3/4 Transforaminal Epidural Steroid Injection;  Surgeon: Desean Dias MD;  Location: [facility] PAIN T;  Service: Pain Management;  Laterality: Bilateral;       Time Tracking:     OT Date of Treatment: 12/24/19  OT Start Time: 0715  OT Stop Time: 0740  OT Total Time (min): 25 min    Billable Minutes:Evaluation 15 min  Therapeutic Activity 10 min    Ani Mcdaniels, PT/OT  12/24/2019

## 2019-12-24 NOTE — PROGRESS NOTES
Upon shift assessment, patient has healed pressure injury to sacrum. Patient states she had a pressure injury in the past.

## 2019-12-24 NOTE — PLAN OF CARE
Patient AAOx4  PIV clean dry and intact  PRN pain medicine administered  IV abx administered  Heat compress applied to L wrist  No falls this shift  Will continue to monitor.

## 2019-12-24 NOTE — SUBJECTIVE & OBJECTIVE
Interval History: pt continues to reports pain and swelling to left wrist with ecchymosis present.  Pt encouraged to elevate affected limb.  ARB held due to mild BP and HARESH.  Leukocytosis resolved and pt remains afebrile.  H/H stable.  Xray indicates OA with IV steroids x 2 doses given for inflammation.      Review of Systems   Constitutional: Positive for activity change. Negative for appetite change, chills, diaphoresis, fatigue and fever.   HENT: Negative for ear discharge and facial swelling.    Eyes: Negative for pain and redness.   Respiratory: Negative for cough and shortness of breath.    Cardiovascular: Negative for chest pain, palpitations and leg swelling.   Gastrointestinal: Negative for abdominal distention, abdominal pain, blood in stool, constipation, diarrhea, nausea and vomiting.   Endocrine: Negative for polydipsia and polyphagia.   Genitourinary: Negative for difficulty urinating, dysuria, flank pain and hematuria.   Musculoskeletal: Positive for arthralgias and gait problem. Negative for neck pain and neck stiffness.        Left wrist  Patient reports chronic pain from multiple joints secondary to arthritis   Skin: Positive for color change.   Allergic/Immunologic: Negative for food allergies.   Neurological: Positive for weakness. Negative for seizures, facial asymmetry and speech difficulty.        Stiff joints  General debility   Hematological: Does not bruise/bleed easily.   Psychiatric/Behavioral: Negative for agitation, behavioral problems, confusion, hallucinations and suicidal ideas. The patient is not nervous/anxious.      Objective:     Vital Signs (Most Recent):  Temp: 98.2 °F (36.8 °C) (12/24/19 1207)  Pulse: 96 (12/24/19 1505)  Resp: 18 (12/24/19 1505)  BP: (!) 104/52 (12/24/19 1207)  SpO2: 99 % (12/24/19 1505) Vital Signs (24h Range):  Temp:  [97.6 °F (36.4 °C)-98.5 °F (36.9 °C)] 98.2 °F (36.8 °C)  Pulse:  [] 96  Resp:  [17-18] 18  SpO2:  [95 %-100 %] 99 %  BP:  ()/(45-76) 104/52     Weight: 55.6 kg (122 lb 9.2 oz)  Body mass index is 21.04 kg/m².    Intake/Output Summary (Last 24 hours) at 12/24/2019 1622  Last data filed at 12/24/2019 1406  Gross per 24 hour   Intake 1880 ml   Output --   Net 1880 ml      Physical Exam   Constitutional: She is oriented to person, place, and time. She appears well-developed. No distress.   Frail elderly   HENT:   Head: Normocephalic and atraumatic.   Eyes: Pupils are equal, round, and reactive to light. Conjunctivae and EOM are normal. Right eye exhibits no discharge. Left eye exhibits no discharge.   Neck: Normal range of motion. Neck supple. No JVD present.   Cardiovascular: Normal rate, regular rhythm and intact distal pulses.   Pulmonary/Chest: No respiratory distress.   Bilateral breath sounds diminished   Abdominal: Soft. Bowel sounds are normal. She exhibits no distension. There is no tenderness. There is no guarding.   Genitourinary:   Genitourinary Comments: Not examined   Musculoskeletal: She exhibits edema and tenderness.   Stiff joints  Left wrist pain with range of motion  Edema and erythema of  left wrist  Tenderness left wrist   Neurological: She is alert and oriented to person, place, and time. No cranial nerve deficit.   Skin: Skin is warm and dry. Capillary refill takes less than 2 seconds. She is not diaphoretic.   Ecchymosis to Left wrist   Psychiatric: She has a normal mood and affect. Her behavior is normal. Judgment and thought content normal.       Significant Labs:   CBC:   Recent Labs   Lab 12/23/19  1301 12/24/19  0353   WBC 13.74* 11.61   HGB 10.6* 8.8*   HCT 34.4* 29.2*    283     CMP:   Recent Labs   Lab 12/23/19  1301 12/24/19  0353   * 131*   K 4.1 4.0    103   CO2 19* 17*   GLU 93 111*   BUN 11 17   CREATININE 0.8 1.3   CALCIUM 10.1 9.2   PROT  --  6.6   ALBUMIN  --  2.6*   BILITOT  --  0.3   ALKPHOS  --  69   AST  --  12   ALT  --  8*   ANIONGAP 12 11   EGFRNONAA >60 40*     POCT  Glucose:   Recent Labs   Lab 12/23/19  2200 12/24/19  1051   POCTGLUCOSE 85 92       Significant Imaging:   Imaging Results          X-Ray Wrist Complete Left (Final result)  Result time 12/23/19 12:25:33    Final result by Kristopher Avila MD (12/23/19 12:25:33)                 Impression:      Osteoarthritis.  No acute bony abnormality      Electronically signed by: Kristopher Avila  Date:    12/23/2019  Time:    12:25             Narrative:    EXAMINATION:  XR WRIST COMPLETE 3 VIEWS LEFT    CLINICAL HISTORY:  Pain in unspecified wrist    TECHNIQUE:  Standard radiography performed.    COMPARISON:  None    FINDINGS:  Advanced osteoarthritis involving the 1st metacarpophalangeal joint.  Mild osteoarthritis involving the radiocarpal compartment.  No acute fracture or dislocation.

## 2019-12-24 NOTE — H&P
[facility] Hospital Medicine  History & Physical    Patient Name: Shireen Felix  MRN: [id number]  Admission Date: 12/23/2019  Attending Physician: Lazara Dalal MD   Primary Care Provider: POOL Chaudhari Jr, MD     Patient seen and ER in Bed 20      Patient information was obtained from patient and ER records.     Subjective:     Principal Problem:Cellulitis of left wrist with left wrist pain    Chief Complaint:   Chief Complaint   Patient presents with    Hand Pain     c/o swelling, redness, pain to left wrist today. Pt denies any trauma.        HPI: This is a 74 y.o. female patient with a PMHx of DM, HTN, stroke, seizures, COPD, and CAD who presents to the Emergency Department for evaluation of left wrist pain which onset gradually earlier today. She notes worsening symptoms over the last couple of hours.  Patient denies any trauma or injury. Symptoms are constant and moderate in severity. No mitigating or exacerbating factors reported. Associated symptoms include increased swelling and erythema of left wrist. Patient denies any fever, chills, shortness of breath, chest pain, nausea, vomiting, dizziness, extremity weakness/numbness, or any other sx symptoms at this time. No prior treatment. No further complaints or concerns at this time.     Patient reports chronic intermittent  left wrist pain which she attributes to arthritis. She states this morning all of a sudden there was new onset redness, warmth, and swelling. Patient evaluated in ER and noted to have left wrist cellulitis.  Patient reports she has a history of staph infection in past and has been placed in the hospital for further evaluation treatment.  Uric acid level checked in the emergency room was within normal limits.    Past Medical History:   Diagnosis Date    Abnormal ECG 8/5/2019    Aneurysm     Anticoagulant long-term use     Arthritis     CAD in native artery 8/5/2019    COPD (chronic obstructive pulmonary  disease)     Diabetes mellitus     Glaucoma     Hypertension     Seizures     Shingles 05/27/2017    Stroke        Past Surgical History:   Procedure Laterality Date    BRAIN SURGERY      CARDIAC CATHETERIZATION      CORONARY ANGIOPLASTY      EPIDURAL STEROID INJECTION INTO LUMBAR SPINE Bilateral 11/12/2019    Procedure: TF XANDER L4/5;  Surgeon: Desean Dias MD;  Location: [facility] PAIN MGT;  Service: Pain Management;  Laterality: Bilateral;    HYSTERECTOMY      TRANSFORAMINAL EPIDURAL INJECTION OF STEROID Bilateral 7/23/2019    Procedure: Bilateral L3/4 Transforaminal Epidural Steroid Injection;  Surgeon: Desean Dias MD;  Location: [facility] PAIN [facility]GT;  Service: Pain Management;  Laterality: Bilateral;       Review of patient's allergies indicates:   Allergen Reactions    Doxycycline Nausea Only    Penicillins Anaphylaxis, Hives, Shortness Of Breath and Swelling    Oxycodone Other (See Comments)     sleepy  sleepy  sleepy  sleepy  sleepy      Adhesive Rash    Betadine [povidone-iodine] Rash     Pt confirms has completed CT w/ IV contrast-iodine without reaction or need for pre-medication.    Sulfa (sulfonamide antibiotics) Itching       No current facility-administered medications on file prior to encounter.      Current Outpatient Medications on File Prior to Encounter   Medication Sig    albuterol (PROVENTIL/VENTOLIN HFA) 90 mcg/actuation inhaler Inhale 2 puffs into the lungs every 4 (four) hours as needed.    albuterol-ipratropium (DUO-NEB) 2.5 mg-0.5 mg/3 mL nebulizer solution Take 3 mLs by nebulization every 6 (six) hours as needed for Wheezing. Rescue    amLODIPine (NORVASC) 10 MG tablet Take 1 tablet (10 mg total) by mouth once daily.    cholecalciferol, vitamin D3, (VITAMIN D3) 1,000 unit capsule Take 10 capsules (10,000 Units total) by mouth once daily. Twice weekly    clopidogrel (PLAVIX) 75 mg tablet Take 1 tablet (75 mg total) by mouth once daily.    denosumab (PROLIA) 60 mg/mL Syrg  every 6 (six) months.    dexlansoprazole (DEXILANT) 60 mg capsule Take 1 capsule (60 mg total) by mouth once daily.    diltiaZEM (CARDIZEM LA) 120 mg 24 hr tablet Take 1 tablet (120 mg total) by mouth once daily.    DULoxetine (CYMBALTA) 30 MG capsule Take 1 capsule (30 mg total) by mouth once daily.    escitalopram oxalate (LEXAPRO) 20 MG tablet Take 1 tablet (20 mg total) by mouth once daily.    eslicarbazepine (APTIOM) 600 mg Tab Take 1 tablet by mouth every evening.    fluticasone furoate-vilanterol (BREO ELLIPTA) 200-25 mcg/dose DsDv diskus inhaler Inhale 1 puff into the lungs every evening.    gemfibrozil (LOPID) 600 MG tablet Take 1 tablet (600 mg total) by mouth once daily.    GI cocktail (mylanta 30 mL, lidocaine 2 % viscous 10 mL, dicyclomine 10 mL) 50 mL Take 50 mLs by mouth 2 (two) times daily.    Lactobacillus acidophilus (PROBIOTIC ACIDOPHILUS ORAL) Take 1 capsule by mouth 2 (two) times daily.    loratadine (CLARITIN) 10 mg tablet Take 1 tablet by mouth once daily.    metFORMIN (GLUCOPHAGE) 500 MG tablet Take 1 tablet (500 mg total) by mouth 2 (two) times daily.    metoprolol tartrate (LOPRESSOR) 25 MG tablet TAKE 1 TABLET BY MOUTH TWICE DAILY WITH FOOD FOR HEART AND BLOOD PRESSURE    oxyCODONE-acetaminophen (PERCOCET) 5-325 mg per tablet Take 1 tablet by mouth every 6 (six) hours as needed for Pain. (Patient not taking: Reported on 12/3/2019)    ranitidine (ZANTAC) 150 MG tablet TAKE 1 TABLET BY MOUTH TWICE DAILY STOMACH ACID    ranolazine (RANEXA) 1,000 mg Tb12 Take 1 tablet (1,000 mg total) by mouth 2 (two) times daily.    sennosides (SENNA) 8.6 mg Cap Take by mouth as needed.    temazepam (RESTORIL) 15 mg Cap Take 1 capsule (15 mg total) by mouth every evening.    tiotropium (SPIRIVA) 18 mcg inhalation capsule Inhale 1 capsule (18 mcg total) into the lungs once daily.    tolterodine (DETROL LA) 4 MG 24 hr capsule Take 4 mg by mouth once daily.    valsartan (DIOVAN) 320 MG tablet  Take 1 tablet (320 mg total) by mouth once daily.    venlafaxine 150 mg TR24 Take 150 mg by mouth once daily.     Family History     Problem Relation (Age of Onset)    No Known Problems Mother, Father        Tobacco Use    Smoking status: Former Smoker     Packs/day: 1.00     Years: 10.00     Pack years: 10.00     Types: Cigarettes     Last attempt to quit: 4/11/2004     Years since quitting: 15.7    Smokeless tobacco: Never Used   Substance and Sexual Activity    Alcohol use: No    Drug use: Never    Sexual activity: Not Currently     Review of Systems   Constitutional: Positive for activity change. Negative for appetite change, chills, diaphoresis, fatigue and fever.   HENT: Negative for ear discharge and facial swelling.    Eyes: Negative for pain and redness.   Respiratory: Negative for cough and shortness of breath.    Cardiovascular: Negative for chest pain, palpitations and leg swelling.   Gastrointestinal: Negative for abdominal distention, abdominal pain, blood in stool, constipation, diarrhea, nausea and vomiting.   Endocrine: Negative for polydipsia and polyphagia.   Genitourinary: Negative for difficulty urinating, dysuria, flank pain and hematuria.   Musculoskeletal: Positive for arthralgias and gait problem. Negative for neck pain and neck stiffness.        Left wrist  Patient reports chronic pain from multiple joints secondary to arthritis   Allergic/Immunologic: Negative for food allergies.   Neurological: Positive for weakness. Negative for seizures, facial asymmetry and speech difficulty.        Stiff joints  General debility   Hematological: Does not bruise/bleed easily.   Psychiatric/Behavioral: Negative for agitation, behavioral problems, confusion, hallucinations and suicidal ideas. The patient is not nervous/anxious.      Objective:     Vital Signs (Most Recent):  Temp: 97.8 °F (36.6 °C) (12/23/19 1146)  Pulse: 82 (12/23/19 1600)  Resp: 20 (12/23/19 1457)  BP: (!) 159/79 (12/23/19  1600)  SpO2: 100 % (12/23/19 1600) Vital Signs (24h Range):  Temp:  [97.8 °F (36.6 °C)] 97.8 °F (36.6 °C)  Pulse:  [80-82] 82  Resp:  [16-20] 20  SpO2:  [97 %-100 %] 100 %  BP: (149-161)/(71-79) 159/79        There is no height or weight on file to calculate BMI.    Physical Exam   Constitutional: She is oriented to person, place, and time. She appears well-developed. No distress.   Frail elderly   HENT:   Head: Normocephalic and atraumatic.   Eyes: Pupils are equal, round, and reactive to light. Conjunctivae and EOM are normal. Right eye exhibits no discharge. Left eye exhibits no discharge.   Neck: Normal range of motion. Neck supple. No JVD present.   Cardiovascular: Normal rate, regular rhythm and intact distal pulses.   Pulmonary/Chest: No respiratory distress.   Bilateral breath sounds diminished   Abdominal: Soft. Bowel sounds are normal. She exhibits no distension. There is no tenderness. There is no guarding.   Genitourinary:   Genitourinary Comments: Not examined   Musculoskeletal: She exhibits edema and tenderness.   Stiff joints  Left wrist pain with range of motion  Edema and erythema of  left wrist  Tenderness left wrist   Neurological: She is alert and oriented to person, place, and time. No cranial nerve deficit.   Skin: Skin is warm and dry. Capillary refill takes less than 2 seconds. She is not diaphoretic. There is erythema.   Left wrist   Psychiatric: She has a normal mood and affect. Her behavior is normal. Judgment and thought content normal.         CRANIAL NERVES     CN III, IV, VI   Pupils are equal, round, and reactive to light.  Extraocular motions are normal.        Significant Labs: Reviewed   Uric acid level ordered    Significant Imaging: Reviewed    Assessment/Plan:     * Cellulitis of left wrist  Patient reports a history of staph infection in the past-  Add IV clindamycin  Monitor closely and if no improvement in a.m. consider upgrading antibiotics and or consult Infectious  Disease  Left wrist x-ray showed Osteoarthritis.  No acute bony abnormality noted.  Blood cultures x2 pending    Chronic back pain  With chronic pain syndrome-  Continue home p.r.n. pain medication      Osteoarthritis of left wrist  P.r.n. pain medication      Debility  Fall and skin precautions  Consult physical therapy and Occupational therapy      Hyponatremia  Monitor      Normocytic anemia  Continue multivitamin      Left wrist pain  Secondary to cellulitis  Left wrist x-ray shows no signs of fracture or acute abnormality  Patient denies trauma      Seizure disorder  Continue home medications  Seizure precautions      COPD (chronic obstructive pulmonary disease)  Monitor O2 sats, supplement O2 as needed  Add q.i.d. DuoNeb      Hypertension associated with diabetes  Continue home medications      History of stroke  Continue home Plavix and Lopid      CAD in native artery  Telemetry  Continue home medications      DM (diabetes mellitus) type II controlled with renal manifestation  Diabetic diet  Accu-Cheks with correctional sliding scale insulin  Home metformin remains on hold        VTE Risk Mitigation (From admission, onward)         Ordered     enoxaparin injection 40 mg  Daily      12/23/19 1813     IP VTE HIGH RISK PATIENT  Once      12/23/19 1813     Place DAMIÁN hose  Until discontinued      12/23/19 1553               Time spent seeing patient( greater than 1/2 spent in direct contact) :  74 minutes    GALILEO Titus  Department of Hospital Medicine   [facility]

## 2019-12-24 NOTE — PT/OT/SLP EVAL
Physical Therapy Evaluation    Patient Name:  Shireen Felix   MRN:  [id number]    Recommendations:     Discharge Recommendations:  home health PT   Discharge Equipment Recommendations: none   Barriers to discharge: None    Assessment:     Shireen Felix is a 74 y.o. female admitted with a medical diagnosis of Cellulitis of left wrist.  She presents with the following impairments/functional limitations:  weakness, gait instability, impaired balance, impaired endurance, impaired self care skills, impaired functional mobilty, pain, edema, impaired skin, decreased lower extremity function, decreased upper extremity function, decreased ROM, impaired sensation .    Rehab Prognosis: Good; patient would benefit from acute skilled PT services to address these deficits and reach maximum level of function.    Recent Surgery: * No surgery found *      Plan:     During this hospitalization, patient to be seen   to address the identified rehab impairments via gait training, therapeutic activities, therapeutic exercises and progress toward the following goals:    · Plan of Care Expires:  12/31/19    Subjective     Chief Complaint: PAIN IN LEFT HAND   Patient/Family Comments/goals: INC MOBILITY AND DEC PAIN   Pain/Comfort:  · Pain Rating 1: 8/10  · Location - Side 1: Left  · Location 1: hand  · Pain Addressed 1: Pre-medicate for activity  · Pain Rating Post-Intervention 1: 8/10    Patients cultural, spiritual, Adventism conflicts given the current situation:      Living Environment:  PT LIVES AT HOME ALONE IN A ONE STORY HOUSE WITH A RAMP TO ENTER.   Prior to admission, patients level of function was DON WITH T/F AND MAIN MODE OF MOBILITY IS VIA WC.  Equipment used at home: wheelchair, bedside commode, shower chair, walker, rolling.  DME owned (not currently used): none.  Upon discharge, patient will have assistance from PT DAUGHTER WHO CHECKS ON HER DAILY.    Objective:     Communicated with NURSE TRAMMELL AND [employer]  CHART REVIEW  prior to session.  Patient found supine with peripheral IV  upon PT entry to room.    General Precautions: Standard, fall   Orthopedic Precautions:N/A   Braces: N/A     Exams:  · RLE ROM: WFL  · RLE Strength: 4-/5  · LLE ROM: WFL  · LLE Strength: 4-/5    Functional Mobility:  · PT MET IN RM LOG ROLLED AND SEATED EOB WITH SBA. PT ROM AN D STRENGTH ASSESSED. PT STOOD WITH RW WITH MIN A. PT NOT WANTING TO  RW D/T INC PAIN IN LEFT HAND. PT GT TRAINED WITH STEP TO GT X 20' WITH RW AND MIN A. PT RETURNED TO BEDSIDE AND SEATED AND SUP IN BED WITH SBA. PT LEFT WITH ALL NEEDS MET AND CALL BELL IN REACH.       AM-PAC 6 CLICK MOBILITY  Total Score:18     Patient left supine with call button in reach and bed alarm on.    GOALS:   Multidisciplinary Problems     Physical Therapy Goals        Problem: Physical Therapy Goal    Goal Priority Disciplines Outcome Goal Variances Interventions   Physical Therapy Goal     PT, PT/OT      Description:  PT WILL BE SEEN FOR P.T. FOR A MIN OF 5 OUT OF 7 DAYS A WEEK  LT19  1. PT WILL COMPLETE BED MOBILITY IND  2. PT WILL STAND T/F WITH RW MOD I  3. PT WILL GT TRAIN X 100' WITH RW AND SBA  4. PT WILL COMPLETE B LE TE X 20 REPS FOR STRENGTHENING.                     History:     Past Medical History:   Diagnosis Date    Abnormal ECG 2019    Aneurysm     Anticoagulant long-term use     Arthritis     CAD in native artery 2019    COPD (chronic obstructive pulmonary disease)     Diabetes mellitus     Glaucoma     Hypertension     Seizures     Shingles 2017    Stroke        Past Surgical History:   Procedure Laterality Date    BRAIN SURGERY      CARDIAC CATHETERIZATION      CORONARY ANGIOPLASTY      EPIDURAL STEROID INJECTION INTO LUMBAR SPINE Bilateral 2019    Procedure: TF XANDER L4/5;  Surgeon: Desean Dias MD;  Location: [facility];  Service: Pain Management;  Laterality: Bilateral;    HYSTERECTOMY      TRANSFORAMINAL EPIDURAL  INJECTION OF STEROID Bilateral 7/23/2019    Procedure: Bilateral L3/4 Transforaminal Epidural Steroid Injection;  Surgeon: Desean Dias MD;  Location: [facility];  Service: Pain Management;  Laterality: Bilateral;       Time Tracking:     PT Received On: 12/24/19  PT Start Time: 0655     PT Stop Time: 0720  PT Total Time (min): 25 min     Billable Minutes: Evaluation 15 and Gait Training 10      Makeda Evans, PT  12/24/2019

## 2019-12-24 NOTE — ED NOTES
Pt is feeling better. She says she is handling the pain better. Home medications discussed with pt again and she she verified the medications she is taking.

## 2019-12-24 NOTE — ASSESSMENT & PLAN NOTE
Fall and skin precautions  Consult physical therapy and Occupational therapy  -CM consulted for discharge planning

## 2019-12-24 NOTE — HOSPITAL COURSE
Pt admitted to Observation Unit for Cellulitis of left wrist.  Xray showed osteoarthritis with no acute bony abnormality.  Pt treated with IV antibiotics, analgesia as needed, and IV steroids x 2 doses.  Leukocytosis resolved prior initiation of IV steroids.  Pt instructed to elevated affected limb.  Mild HARESH noted with IV hydration given and ARB held with .  H/H stable.  On 12/25/19, pt concerned about increased swelling and coolness to fingertips.  CT of left wrist showed cellulitis of the left wrist and hand with no drainable fluid collection and severe osteoarthritis of the 1st carpometacarpal joint.  No fracture or malalignment.  US of LUE showed normal study.  Neurovascular checks in progress. Uric acid normal. Kidney function improved.  Current plan of care continued.        12/26/19  Admitted with left and cellultis with osteoarthritic flare   She improved with antibiotics and steroids   Hospitalization was also significant for acute renal failure due to prerenal  Which improved with IVF   Patietn seen and examined , stable for discharge

## 2019-12-24 NOTE — PLAN OF CARE
SW met with patient at bedside to assess for discharge planning.  Patient was alert and oriented.  Patient reported that she presently does not utilize any respiratory assistive equipment.  Patient report using the medication, Plavix as a blood thinner.  Patient stated that she was a walker and a wheelchair due to a fall she had in July.  Patient identified herself as her help at home, disclosing that she lives alone.  Patient stated that she would like a bedside commode and home health services to help her to return to her maximum level of self-care.  Patient anticipates on discharging with no other needs.  SW provided a transitional care folder, information on advanced directives, information on pharmacy bedside delivery, and discharge planning begins on admission with contact information for any needs/questions.     D/C Plan:  Home  PCP:  Dr. CARLEE Chaudhari  Preferred Pharmacy:   [facility] [place], [place] - [address]  [address]  [address] [address]  Phone: [phone] Fax: [phone]    [facility] [address] [facility]  [address] [facility] [address]  Phone: [phone] Fax: [phone]      Discharge transportation:  TBD  [facility]:  Declined  Pharmacy Bedside Delivery: Declined       12/24/19 1030   Discharge Assessment   Assessment Type Discharge Planning Assessment   Confirmed/corrected address and phone number on facesheet? Yes   Assessment information obtained from? Patient   Expected Length of Stay (days)   (TBD)   Communicated expected length of stay with patient/caregiver yes   Prior to hospitilization cognitive status: Alert/Oriented   Prior to hospitalization functional status: Independent   Current cognitive status: Alert/Oriented   Current Functional Status: Independent   Facility Arrived From: Home   Lives With alone   Able to Return to Prior Arrangements yes   Is patient able to care for self after discharge? Yes   Who are  your caregiver(s) and their phone number(s)? Rm Ojeda (brother) - [phone]   Patient's perception of discharge disposition home or selfcare;home health   Readmission Within the Last 30 Days no previous admission in last 30 days   Patient currently being followed by outpatient case management? No   Patient currently receives any other outside agency services? No   Equipment Currently Used at Home bedside commode;bath bench;wheelchair;hospital bed   Do you have any problems affording any of your prescribed medications? No   Is the patient taking medications as prescribed? yes   Does the patient have transportation home?   (Dependent on whether or not her family has left out of town by the time of discharge)   Dialysis Name and Scheduled days N/A   Does the patient receive services at the [facility]? No   Discharge Plan A Home;Home Health   DME Needed Upon Discharge  none   Patient/Family in Agreement with Plan yes

## 2019-12-24 NOTE — ED NOTES
Pt states that she is feeling a little bit better. She is trying not to focus on the pain. Pt given another warm compress to apply to hand.

## 2019-12-24 NOTE — ASSESSMENT & PLAN NOTE
Patient reports a history of staph infection in the past-  Add IV clindamycin  Monitor closely and if no improvement in a.m. consider upgrading antibiotics and or consult Infectious Disease  Left wrist x-ray showed Osteoarthritis.  No acute bony abnormality noted.  Blood cultures x2 pending

## 2019-12-24 NOTE — ASSESSMENT & PLAN NOTE
Secondary to cellulitis  Left wrist x-ray shows no signs of fracture or acute abnormality  Patient denies trauma

## 2019-12-24 NOTE — NURSING
Chart review completed for diabetes screening. Blood glucose controlled at this time. No needs identified. Please consult if needed.

## 2019-12-24 NOTE — PROGRESS NOTES
[facility] Hospital Medicine  Progress Note    Patient Name: Shireen Felix  MRN: [id number]  Patient Class: IP- Inpatient   Admission Date: 12/23/2019  Length of Stay: 0 days  Attending Physician: Stephenie Solano MD  Primary Care Provider: POOL Chaudhari Jr, MD        Subjective:     Principal Problem:Cellulitis of left wrist        HPI:  This is a 74 y.o. female patient with a PMHx of DM, HTN, stroke, seizures, COPD, and CAD who presents to the Emergency Department for evaluation of left wrist pain which onset gradually earlier today. She notes worsening symptoms over the last couple of hours.  Patient denies any trauma or injury. Symptoms are constant and moderate in severity. No mitigating or exacerbating factors reported. Associated symptoms include increased swelling and erythema of left wrist. Patient denies any fever, chills, shortness of breath, chest pain, nausea, vomiting, dizziness, extremity weakness/numbness, or any other sx symptoms at this time. No prior treatment. No further complaints or concerns at this time.     Patient reports chronic intermittent  left wrist pain which she attributes to arthritis. She states this morning all of a sudden there was new onset redness, warmth, and swelling. Patient evaluated in ER and noted to have left wrist cellulitis.  Patient reports she has a history of staph infection in past and has been placed in the hospital for further evaluation treatment.  Uric acid level checked in the emergency room was within normal limits.    Overview/Hospital Course:  Pt admitted to Observation Unit for Cellulitis of left wrist.  Xray showed osteoarthritis with no acute bony abnormality.  Pt treated with IV antibiotics, analgesia as needed, and IV steroids x 2 doses.  Leukocytosis resolved prior initiation of IV steroids.  Pt instructed to elevated affected limb.  Mild HARESH noted with IV hydration given and ARB held with .  H/H stable.      Interval History:  pt continues to reports pain and swelling to left wrist with ecchymosis present.  Pt encouraged to elevate affected limb.  ARB held due to mild BP and HARESH.  Leukocytosis resolved and pt remains afebrile.  H/H stable.  Xray indicates OA with IV steroids x 2 doses given for inflammation.      Review of Systems   Constitutional: Positive for activity change. Negative for appetite change, chills, diaphoresis, fatigue and fever.   HENT: Negative for ear discharge and facial swelling.    Eyes: Negative for pain and redness.   Respiratory: Negative for cough and shortness of breath.    Cardiovascular: Negative for chest pain, palpitations and leg swelling.   Gastrointestinal: Negative for abdominal distention, abdominal pain, blood in stool, constipation, diarrhea, nausea and vomiting.   Endocrine: Negative for polydipsia and polyphagia.   Genitourinary: Negative for difficulty urinating, dysuria, flank pain and hematuria.   Musculoskeletal: Positive for arthralgias and gait problem. Negative for neck pain and neck stiffness.        Left wrist  Patient reports chronic pain from multiple joints secondary to arthritis   Skin: Positive for color change.   Allergic/Immunologic: Negative for food allergies.   Neurological: Positive for weakness. Negative for seizures, facial asymmetry and speech difficulty.        Stiff joints  General debility   Hematological: Does not bruise/bleed easily.   Psychiatric/Behavioral: Negative for agitation, behavioral problems, confusion, hallucinations and suicidal ideas. The patient is not nervous/anxious.      Objective:     Vital Signs (Most Recent):  Temp: 98.2 °F (36.8 °C) (12/24/19 1207)  Pulse: 96 (12/24/19 1505)  Resp: 18 (12/24/19 1505)  BP: (!) 104/52 (12/24/19 1207)  SpO2: 99 % (12/24/19 1505) Vital Signs (24h Range):  Temp:  [97.6 °F (36.4 °C)-98.5 °F (36.9 °C)] 98.2 °F (36.8 °C)  Pulse:  [] 96  Resp:  [17-18] 18  SpO2:  [95 %-100 %] 99 %  BP: ()/(45-76) 104/52      Weight: 55.6 kg (122 lb 9.2 oz)  Body mass index is 21.04 kg/m².    Intake/Output Summary (Last 24 hours) at 12/24/2019 1622  Last data filed at 12/24/2019 1406  Gross per 24 hour   Intake 1880 ml   Output --   Net 1880 ml      Physical Exam   Constitutional: She is oriented to person, place, and time. She appears well-developed. No distress.   Frail elderly   HENT:   Head: Normocephalic and atraumatic.   Eyes: Pupils are equal, round, and reactive to light. Conjunctivae and EOM are normal. Right eye exhibits no discharge. Left eye exhibits no discharge.   Neck: Normal range of motion. Neck supple. No JVD present.   Cardiovascular: Normal rate, regular rhythm and intact distal pulses.   Pulmonary/Chest: No respiratory distress.   Bilateral breath sounds diminished   Abdominal: Soft. Bowel sounds are normal. She exhibits no distension. There is no tenderness. There is no guarding.   Genitourinary:   Genitourinary Comments: Not examined   Musculoskeletal: She exhibits edema and tenderness.   Stiff joints  Left wrist pain with range of motion  Edema and erythema of  left wrist  Tenderness left wrist   Neurological: She is alert and oriented to person, place, and time. No cranial nerve deficit.   Skin: Skin is warm and dry. Capillary refill takes less than 2 seconds. She is not diaphoretic.   Ecchymosis to Left wrist   Psychiatric: She has a normal mood and affect. Her behavior is normal. Judgment and thought content normal.       Significant Labs:   CBC:   Recent Labs   Lab 12/23/19  1301 12/24/19  0353   WBC 13.74* 11.61   HGB 10.6* 8.8*   HCT 34.4* 29.2*    283     CMP:   Recent Labs   Lab 12/23/19  1301 12/24/19  0353   * 131*   K 4.1 4.0    103   CO2 19* 17*   GLU 93 111*   BUN 11 17   CREATININE 0.8 1.3   CALCIUM 10.1 9.2   PROT  --  6.6   ALBUMIN  --  2.6*   BILITOT  --  0.3   ALKPHOS  --  69   AST  --  12   ALT  --  8*   ANIONGAP 12 11   EGFRNONAA >60 40*     POCT Glucose:   Recent Labs    Lab 12/23/19  2200 12/24/19  1051   POCTGLUCOSE 85 92       Significant Imaging:   Imaging Results          X-Ray Wrist Complete Left (Final result)  Result time 12/23/19 12:25:33    Final result by Kristopher Avila MD (12/23/19 12:25:33)                 Impression:      Osteoarthritis.  No acute bony abnormality      Electronically signed by: Kristopher Avila  Date:    12/23/2019  Time:    12:25             Narrative:    EXAMINATION:  XR WRIST COMPLETE 3 VIEWS LEFT    CLINICAL HISTORY:  Pain in unspecified wrist    TECHNIQUE:  Standard radiography performed.    COMPARISON:  None    FINDINGS:  Advanced osteoarthritis involving the 1st metacarpophalangeal joint.  Mild osteoarthritis involving the radiocarpal compartment.  No acute fracture or dislocation.                                Assessment/Plan:      * Cellulitis of left wrist  Patient reports a history of staph infection in the past-  Add IV clindamycin  -IV steroids initiated   Monitor closely and if no improvement in a.m. consider upgrading antibiotics and or consult Infectious Disease  Left wrist x-ray showed Osteoarthritis.  No acute bony abnormality noted.  Blood cultures x2 pending  -keep affected limb elevated   -leukocytosis resolved   -analgesia as needed     Decreased GFR  -GFR 40 and creatinine 1.3  -IV hydration   -repeat CMP in am       Chronic pain syndrome  -hx of   -analgesia as needed       Chronic back pain  With chronic pain syndrome-  Continue home p.r.n. pain medication      History of staph infection  -reported       Osteoarthritis of left wrist  P.r.n. pain medication      Debility  Fall and skin precautions  Consult physical therapy and Occupational therapy  -CM consulted for discharge planning       Hyponatremia  Monitor      Normocytic anemia  -stable  Continue multivitamin      Left wrist pain  Secondary to cellulitis and trauma   -pt states she hit her left wrist on a wall Sunday night with symptoms presenting yesterday morning  Left  wrist x-ray shows no signs of fracture or acute abnormality  Patient denies trauma  -analgesia prn       Seizure disorder  Continue home medications  Seizure precautions      COPD (chronic obstructive pulmonary disease)  Monitor O2 sats, supplement O2 as needed  Add q.i.d. DuoNeb      Hypertension associated with diabetes  Continue home medications  -ARB held due to soft BP       History of stroke  Continue home Plavix and Lopid      CAD in native artery  Telemetry  Continue home medications  -ARB held due to mild BP       DM (diabetes mellitus) type II controlled with renal manifestation  Diabetic diet  Accu-Cheks with correctional sliding scale insulin  Home metformin remains on hold        VTE Risk Mitigation (From admission, onward)         Ordered     enoxaparin injection 40 mg  Daily      12/23/19 1813     IP VTE HIGH RISK PATIENT  Once      12/23/19 1813     Place DAMIÁN hose  Until discontinued      12/23/19 1016                      Shelby Encarnacion NP  Department of Hospital Medicine   [facility]

## 2019-12-24 NOTE — PLAN OF CARE
Copy of RONQUILLO was given the patient and signature obtained.        12/24/19 1036   RONQUILLO Message   Medicare Outpatient and Observation Notification regarding financial responsibility Given to patient/caregiver;Explained to patient/caregiver;Signed/date by patient/caregiver   Date RONQUILLO was signed 12/24/19   Time RONQUILLO was signed 101

## 2019-12-24 NOTE — ASSESSMENT & PLAN NOTE
Patient reports a history of staph infection in the past-  Add IV clindamycin  -IV steroids initiated   Monitor closely and if no improvement in a.m. consider upgrading antibiotics and or consult Infectious Disease  Left wrist x-ray showed Osteoarthritis.  No acute bony abnormality noted.  Blood cultures x2 pending  -keep affected limb elevated   -leukocytosis resolved   -analgesia as needed

## 2019-12-24 NOTE — NURSING
Patient received from ER. Vital signs stable. Oriented to unit. Instructed to use call light for assistance.   Will continue to monitor.

## 2019-12-24 NOTE — PROGRESS NOTES
Secure chat sent to call team to notify of patient BP 92/45 HR 80. All BP medications held this am.

## 2019-12-25 LAB
ALBUMIN SERPL BCP-MCNC: 2.3 G/DL (ref 3.5–5.2)
ALP SERPL-CCNC: 62 U/L (ref 55–135)
ALT SERPL W/O P-5'-P-CCNC: 7 U/L (ref 10–44)
ANION GAP SERPL CALC-SCNC: 8 MMOL/L (ref 8–16)
AST SERPL-CCNC: 13 U/L (ref 10–40)
BASOPHILS # BLD AUTO: 0.01 K/UL (ref 0–0.2)
BASOPHILS NFR BLD: 0.1 % (ref 0–1.9)
BILIRUB SERPL-MCNC: 0.2 MG/DL (ref 0.1–1)
BUN SERPL-MCNC: 16 MG/DL (ref 8–23)
CALCIUM SERPL-MCNC: 8.8 MG/DL (ref 8.7–10.5)
CHLORIDE SERPL-SCNC: 111 MMOL/L (ref 95–110)
CO2 SERPL-SCNC: 17 MMOL/L (ref 23–29)
CREAT SERPL-MCNC: 1.1 MG/DL (ref 0.5–1.4)
DIFFERENTIAL METHOD: ABNORMAL
EOSINOPHIL # BLD AUTO: 0 K/UL (ref 0–0.5)
EOSINOPHIL NFR BLD: 0 % (ref 0–8)
ERYTHROCYTE [DISTWIDTH] IN BLOOD BY AUTOMATED COUNT: 14.6 % (ref 11.5–14.5)
EST. GFR  (AFRICAN AMERICAN): 57 ML/MIN/1.73 M^2
EST. GFR  (NON AFRICAN AMERICAN): 49 ML/MIN/1.73 M^2
GLUCOSE SERPL-MCNC: 143 MG/DL (ref 70–110)
HCT VFR BLD AUTO: 26.2 % (ref 37–48.5)
HGB BLD-MCNC: 8.1 G/DL (ref 12–16)
IMM GRANULOCYTES # BLD AUTO: 0.05 K/UL (ref 0–0.04)
IMM GRANULOCYTES NFR BLD AUTO: 0.7 % (ref 0–0.5)
LYMPHOCYTES # BLD AUTO: 0.5 K/UL (ref 1–4.8)
LYMPHOCYTES NFR BLD: 6.6 % (ref 18–48)
MAGNESIUM SERPL-MCNC: 1.9 MG/DL (ref 1.6–2.6)
MCH RBC QN AUTO: 28.9 PG (ref 27–31)
MCHC RBC AUTO-ENTMCNC: 30.9 G/DL (ref 32–36)
MCV RBC AUTO: 94 FL (ref 82–98)
MONOCYTES # BLD AUTO: 0.4 K/UL (ref 0.3–1)
MONOCYTES NFR BLD: 5.2 % (ref 4–15)
NEUTROPHILS # BLD AUTO: 6.4 K/UL (ref 1.8–7.7)
NEUTROPHILS NFR BLD: 87.4 % (ref 38–73)
NRBC BLD-RTO: 0 /100 WBC
PLATELET # BLD AUTO: 257 K/UL (ref 150–350)
PMV BLD AUTO: 9.2 FL (ref 9.2–12.9)
POCT GLUCOSE: 112 MG/DL (ref 70–110)
POCT GLUCOSE: 113 MG/DL (ref 70–110)
POCT GLUCOSE: 114 MG/DL (ref 70–110)
POCT GLUCOSE: 163 MG/DL (ref 70–110)
POTASSIUM SERPL-SCNC: 4.3 MMOL/L (ref 3.5–5.1)
PROT SERPL-MCNC: 6.2 G/DL (ref 6–8.4)
RBC # BLD AUTO: 2.8 M/UL (ref 4–5.4)
SODIUM SERPL-SCNC: 136 MMOL/L (ref 136–145)
URATE SERPL-MCNC: 4.3 MG/DL (ref 2.4–5.7)
WBC # BLD AUTO: 7.29 K/UL (ref 3.9–12.7)

## 2019-12-25 PROCEDURE — 25000003 PHARM REV CODE 250: Performed by: NURSE PRACTITIONER

## 2019-12-25 PROCEDURE — 94760 N-INVAS EAR/PLS OXIMETRY 1: CPT

## 2019-12-25 PROCEDURE — 85025 COMPLETE CBC W/AUTO DIFF WBC: CPT

## 2019-12-25 PROCEDURE — 84550 ASSAY OF BLOOD/URIC ACID: CPT

## 2019-12-25 PROCEDURE — 63600175 PHARM REV CODE 636 W HCPCS: Performed by: NURSE PRACTITIONER

## 2019-12-25 PROCEDURE — 63600175 PHARM REV CODE 636 W HCPCS: Performed by: INTERNAL MEDICINE

## 2019-12-25 PROCEDURE — S0077 INJECTION, CLINDAMYCIN PHOSP: HCPCS | Performed by: NURSE PRACTITIONER

## 2019-12-25 PROCEDURE — 96372 THER/PROPH/DIAG INJ SC/IM: CPT

## 2019-12-25 PROCEDURE — 80053 COMPREHEN METABOLIC PANEL: CPT

## 2019-12-25 PROCEDURE — 25000003 PHARM REV CODE 250: Performed by: INTERNAL MEDICINE

## 2019-12-25 PROCEDURE — 11000001 HC ACUTE MED/SURG PRIVATE ROOM

## 2019-12-25 PROCEDURE — 83735 ASSAY OF MAGNESIUM: CPT

## 2019-12-25 PROCEDURE — 36415 COLL VENOUS BLD VENIPUNCTURE: CPT

## 2019-12-25 RX ORDER — FUROSEMIDE 10 MG/ML
20 INJECTION INTRAMUSCULAR; INTRAVENOUS ONCE
Status: COMPLETED | OUTPATIENT
Start: 2019-12-25 | End: 2019-12-25

## 2019-12-25 RX ORDER — CLINDAMYCIN PHOSPHATE 900 MG/50ML
900 INJECTION, SOLUTION INTRAVENOUS
Status: DISCONTINUED | OUTPATIENT
Start: 2019-12-25 | End: 2019-12-26 | Stop reason: HOSPADM

## 2019-12-25 RX ADMIN — CLINDAMYCIN IN 5 PERCENT DEXTROSE 900 MG: 18 INJECTION, SOLUTION INTRAVENOUS at 04:12

## 2019-12-25 RX ADMIN — Medication 6 MG: at 09:12

## 2019-12-25 RX ADMIN — CLINDAMYCIN IN 5 PERCENT DEXTROSE 900 MG: 18 INJECTION, SOLUTION INTRAVENOUS at 06:12

## 2019-12-25 RX ADMIN — ESLICARBAZEPINE ACETATE 600 MG: 200 TABLET ORAL at 09:12

## 2019-12-25 RX ADMIN — THERA TABS 1 TABLET: TAB at 09:12

## 2019-12-25 RX ADMIN — OXYBUTYNIN CHLORIDE 10 MG: 5 TABLET, EXTENDED RELEASE ORAL at 09:12

## 2019-12-25 RX ADMIN — GEMFIBROZIL 600 MG: 600 TABLET ORAL at 09:12

## 2019-12-25 RX ADMIN — CLOPIDOGREL BISULFATE 75 MG: 75 TABLET ORAL at 09:12

## 2019-12-25 RX ADMIN — AMLODIPINE BESYLATE 10 MG: 10 TABLET ORAL at 09:12

## 2019-12-25 RX ADMIN — FUROSEMIDE 20 MG: 10 INJECTION, SOLUTION INTRAMUSCULAR; INTRAVENOUS at 09:12

## 2019-12-25 RX ADMIN — SODIUM CHLORIDE: 0.9 INJECTION, SOLUTION INTRAVENOUS at 04:12

## 2019-12-25 RX ADMIN — METOPROLOL TARTRATE 25 MG: 25 TABLET ORAL at 09:12

## 2019-12-25 RX ADMIN — PANTOPRAZOLE SODIUM 40 MG: 40 TABLET, DELAYED RELEASE ORAL at 09:12

## 2019-12-25 RX ADMIN — METHYLPREDNISOLONE SODIUM SUCCINATE 40 MG: 40 INJECTION, POWDER, FOR SOLUTION INTRAMUSCULAR; INTRAVENOUS at 09:12

## 2019-12-25 RX ADMIN — SENNOSIDES 8.6 MG: 8.6 TABLET, FILM COATED ORAL at 09:12

## 2019-12-25 RX ADMIN — ENOXAPARIN SODIUM 40 MG: 100 INJECTION SUBCUTANEOUS at 06:12

## 2019-12-25 RX ADMIN — OXYCODONE HYDROCHLORIDE AND ACETAMINOPHEN 1 TABLET: 5; 325 TABLET ORAL at 04:12

## 2019-12-25 RX ADMIN — ESCITALOPRAM OXALATE 20 MG: 10 TABLET ORAL at 09:12

## 2019-12-25 RX ADMIN — RANOLAZINE 1000 MG: 500 TABLET, FILM COATED, EXTENDED RELEASE ORAL at 09:12

## 2019-12-25 RX ADMIN — DILTIAZEM HYDROCHLORIDE 120 MG: 120 CAPSULE, COATED, EXTENDED RELEASE ORAL at 09:12

## 2019-12-25 RX ADMIN — CETIRIZINE HYDROCHLORIDE 10 MG: 10 TABLET, FILM COATED ORAL at 09:12

## 2019-12-25 RX ADMIN — DULOXETINE HYDROCHLORIDE 30 MG: 30 CAPSULE, DELAYED RELEASE ORAL at 09:12

## 2019-12-25 RX ADMIN — LACTOBACILLUS TAB 1 TABLET: TAB at 06:12

## 2019-12-25 RX ADMIN — KETOROLAC TROMETHAMINE 15 MG: 30 INJECTION, SOLUTION INTRAMUSCULAR; INTRAVENOUS at 05:12

## 2019-12-25 RX ADMIN — MORPHINE SULFATE 2 MG: 4 INJECTION INTRAVENOUS at 10:12

## 2019-12-25 NOTE — ASSESSMENT & PLAN NOTE
Secondary to cellulitis and trauma   -pt states she hit her left wrist on a wall Sunday night with symptoms presenting Monday morning  Left wrist x-ray shows no signs of fracture or acute abnormality  Patient denies trauma  -analgesia prn

## 2019-12-25 NOTE — SUBJECTIVE & OBJECTIVE
Interval History: pt remains stable and reports numbness and coolness to fingertips. CT of wrist and US of LUE results reviewed.  Pt encouraged to keep affected area elevated. Uric acid level normal.  Neurovascular checks in progress. Kidney function improved.  Current plan of care continued.     Review of Systems   Constitutional: Positive for activity change. Negative for appetite change, chills, diaphoresis, fatigue and fever.   HENT: Negative for ear discharge and facial swelling.    Eyes: Negative for pain and redness.   Respiratory: Negative for cough and shortness of breath.    Cardiovascular: Negative for chest pain, palpitations and leg swelling.   Gastrointestinal: Negative for abdominal distention, abdominal pain, blood in stool, constipation, diarrhea, nausea and vomiting.   Endocrine: Negative for polydipsia and polyphagia.   Genitourinary: Negative for difficulty urinating, dysuria, flank pain and hematuria.   Musculoskeletal: Positive for arthralgias and gait problem. Negative for neck pain and neck stiffness.        Left wrist  Patient reports chronic pain from multiple joints secondary to arthritis   Skin: Positive for color change.   Allergic/Immunologic: Negative for food allergies.   Neurological: Positive for weakness and numbness. Negative for seizures, facial asymmetry and speech difficulty.        Stiff joints  General debility   Hematological: Does not bruise/bleed easily.   Psychiatric/Behavioral: Negative for agitation, behavioral problems, confusion, hallucinations and suicidal ideas. The patient is not nervous/anxious.      Objective:     Vital Signs (Most Recent):  Temp: 98.6 °F (37 °C) (12/25/19 1606)  Pulse: 92 (12/25/19 1229)  Resp: 18 (12/25/19 1606)  BP: 133/62 (12/25/19 1606)  SpO2: (!) 94 % (12/25/19 1606) Vital Signs (24h Range):  Temp:  [98 °F (36.7 °C)-98.6 °F (37 °C)] 98.6 °F (37 °C)  Pulse:  [] 92  Resp:  [15-18] 18  SpO2:  [94 %-100 %] 94 %  BP: (110-142)/(53-69)  133/62     Weight: 55.6 kg (122 lb 9.2 oz)  Body mass index is 21.04 kg/m².    Intake/Output Summary (Last 24 hours) at 12/25/2019 1732  Last data filed at 12/25/2019 1400  Gross per 24 hour   Intake 2628.75 ml   Output 750 ml   Net 1878.75 ml      Physical Exam   Constitutional: She is oriented to person, place, and time. She appears well-developed. No distress.   Frail elderly   HENT:   Head: Normocephalic and atraumatic.   Eyes: Pupils are equal, round, and reactive to light. Conjunctivae and EOM are normal. Right eye exhibits no discharge. Left eye exhibits no discharge.   Neck: Normal range of motion. Neck supple. No JVD present.   Cardiovascular: Normal rate, regular rhythm and intact distal pulses.   Pulmonary/Chest: No respiratory distress.   Bilateral breath sounds diminished   Abdominal: Soft. Bowel sounds are normal. She exhibits no distension. There is no tenderness. There is no guarding.   Genitourinary:   Genitourinary Comments: Not examined   Musculoskeletal: She exhibits edema and tenderness.   Stiff joints  Left wrist pain with range of motion  Edema and erythema of  left wrist  Tenderness left wrist   Neurological: She is alert and oriented to person, place, and time. No cranial nerve deficit.   Skin: Skin is warm and dry. Capillary refill takes less than 2 seconds. She is not diaphoretic.   Ecchymosis to Left wrist   Psychiatric: She has a normal mood and affect. Her behavior is normal. Judgment and thought content normal.       Significant Labs:   CBC:   Recent Labs   Lab 12/24/19  0353 12/25/19  0337   WBC 11.61 7.29   HGB 8.8* 8.1*   HCT 29.2* 26.2*    257     CMP:   Recent Labs   Lab 12/24/19  0353 12/25/19  0337   * 136   K 4.0 4.3    111*   CO2 17* 17*   * 143*   BUN 17 16   CREATININE 1.3 1.1   CALCIUM 9.2 8.8   PROT 6.6 6.2   ALBUMIN 2.6* 2.3*   BILITOT 0.3 0.2   ALKPHOS 69 62   AST 12 13   ALT 8* 7*   ANIONGAP 11 8   EGFRNONAA 40* 49*       Significant Imaging:    Imaging Results          X-Ray Wrist Complete Left (Final result)  Result time 12/23/19 12:25:33    Final result by Kristopher Avila MD (12/23/19 12:25:33)                 Impression:      Osteoarthritis.  No acute bony abnormality      Electronically signed by: Kristopher Avila  Date:    12/23/2019  Time:    12:25             Narrative:    EXAMINATION:  XR WRIST COMPLETE 3 VIEWS LEFT    CLINICAL HISTORY:  Pain in unspecified wrist    TECHNIQUE:  Standard radiography performed.    COMPARISON:  None    FINDINGS:  Advanced osteoarthritis involving the 1st metacarpophalangeal joint.  Mild osteoarthritis involving the radiocarpal compartment.  No acute fracture or dislocation.

## 2019-12-25 NOTE — PLAN OF CARE
Pt complains of left wrist pain. Pain medication is effective. Pt states numbness/tingling to hand. Iv fluids are infusing. IV ABX given per order. Blood glucose is being monitored. Seizure precautions maintained. No injuries. Will continue to monitor. 12 hour chart check is completed.

## 2019-12-25 NOTE — PROGRESS NOTES
[facility] Hospital Medicine  Progress Note    Patient Name: Shireen Felix  MRN: [id number]  Patient Class: IP- Inpatient   Admission Date: 12/23/2019  Length of Stay: 1 days  Attending Physician: Stephenie Solano MD  Primary Care Provider: POOL Chaudhari Jr, MD        Subjective:     Principal Problem:Cellulitis of left wrist        HPI:  This is a 74 y.o. female patient with a PMHx of DM, HTN, stroke, seizures, COPD, and CAD who presents to the Emergency Department for evaluation of left wrist pain which onset gradually earlier today. She notes worsening symptoms over the last couple of hours.  Patient denies any trauma or injury. Symptoms are constant and moderate in severity. No mitigating or exacerbating factors reported. Associated symptoms include increased swelling and erythema of left wrist. Patient denies any fever, chills, shortness of breath, chest pain, nausea, vomiting, dizziness, extremity weakness/numbness, or any other sx symptoms at this time. No prior treatment. No further complaints or concerns at this time.     Patient reports chronic intermittent  left wrist pain which she attributes to arthritis. She states this morning all of a sudden there was new onset redness, warmth, and swelling. Patient evaluated in ER and noted to have left wrist cellulitis.  Patient reports she has a history of staph infection in past and has been placed in the hospital for further evaluation treatment.  Uric acid level checked in the emergency room was within normal limits.    Overview/Hospital Course:  Pt admitted to Observation Unit for Cellulitis of left wrist.  Xray showed osteoarthritis with no acute bony abnormality.  Pt treated with IV antibiotics, analgesia as needed, and IV steroids x 2 doses.  Leukocytosis resolved prior initiation of IV steroids.  Pt instructed to elevated affected limb.  Mild HARESH noted with IV hydration given and ARB held with .  H/H stable.  On 12/25/19, pt  concerned about increased swelling and coolness to fingertips.  CT of left wrist showed cellulitis of the left wrist and hand with no drainable fluid collection and severe osteoarthritis of the 1st carpometacarpal joint.  No fracture or malalignment.  US of LUE showed normal study.  Neurovascular checks in progress. Uric acid normal. Kidney function improved.  Current plan of care continued.        Interval History: pt remains stable and reports numbness and coolness to fingertips. CT of wrist and US of LUE results reviewed.  Pt encouraged to keep affected area elevated. Uric acid level normal.  Neurovascular checks in progress. Kidney function improved.  Current plan of care continued.     Review of Systems   Constitutional: Positive for activity change. Negative for appetite change, chills, diaphoresis, fatigue and fever.   HENT: Negative for ear discharge and facial swelling.    Eyes: Negative for pain and redness.   Respiratory: Negative for cough and shortness of breath.    Cardiovascular: Negative for chest pain, palpitations and leg swelling.   Gastrointestinal: Negative for abdominal distention, abdominal pain, blood in stool, constipation, diarrhea, nausea and vomiting.   Endocrine: Negative for polydipsia and polyphagia.   Genitourinary: Negative for difficulty urinating, dysuria, flank pain and hematuria.   Musculoskeletal: Positive for arthralgias and gait problem. Negative for neck pain and neck stiffness.        Left wrist  Patient reports chronic pain from multiple joints secondary to arthritis   Skin: Positive for color change.   Allergic/Immunologic: Negative for food allergies.   Neurological: Positive for weakness and numbness. Negative for seizures, facial asymmetry and speech difficulty.        Stiff joints  General debility   Hematological: Does not bruise/bleed easily.   Psychiatric/Behavioral: Negative for agitation, behavioral problems, confusion, hallucinations and suicidal ideas. The  patient is not nervous/anxious.      Objective:     Vital Signs (Most Recent):  Temp: 98.6 °F (37 °C) (12/25/19 1606)  Pulse: 92 (12/25/19 1229)  Resp: 18 (12/25/19 1606)  BP: 133/62 (12/25/19 1606)  SpO2: (!) 94 % (12/25/19 1606) Vital Signs (24h Range):  Temp:  [98 °F (36.7 °C)-98.6 °F (37 °C)] 98.6 °F (37 °C)  Pulse:  [] 92  Resp:  [15-18] 18  SpO2:  [94 %-100 %] 94 %  BP: (110-142)/(53-69) 133/62     Weight: 55.6 kg (122 lb 9.2 oz)  Body mass index is 21.04 kg/m².    Intake/Output Summary (Last 24 hours) at 12/25/2019 1732  Last data filed at 12/25/2019 1400  Gross per 24 hour   Intake 2628.75 ml   Output 750 ml   Net 1878.75 ml      Physical Exam   Constitutional: She is oriented to person, place, and time. She appears well-developed. No distress.   Frail elderly   HENT:   Head: Normocephalic and atraumatic.   Eyes: Pupils are equal, round, and reactive to light. Conjunctivae and EOM are normal. Right eye exhibits no discharge. Left eye exhibits no discharge.   Neck: Normal range of motion. Neck supple. No JVD present.   Cardiovascular: Normal rate, regular rhythm and intact distal pulses.   Pulmonary/Chest: No respiratory distress.   Bilateral breath sounds diminished   Abdominal: Soft. Bowel sounds are normal. She exhibits no distension. There is no tenderness. There is no guarding.   Genitourinary:   Genitourinary Comments: Not examined   Musculoskeletal: She exhibits edema and tenderness.   Stiff joints  Left wrist pain with range of motion  Edema and erythema of  left wrist  Tenderness left wrist   Neurological: She is alert and oriented to person, place, and time. No cranial nerve deficit.   Skin: Skin is warm and dry. Capillary refill takes less than 2 seconds. She is not diaphoretic.   Ecchymosis to Left wrist   Psychiatric: She has a normal mood and affect. Her behavior is normal. Judgment and thought content normal.       Significant Labs:   CBC:   Recent Labs   Lab 12/24/19  3076 12/25/19  8341    WBC 11.61 7.29   HGB 8.8* 8.1*   HCT 29.2* 26.2*    257     CMP:   Recent Labs   Lab 12/24/19  0353 12/25/19  0337   * 136   K 4.0 4.3    111*   CO2 17* 17*   * 143*   BUN 17 16   CREATININE 1.3 1.1   CALCIUM 9.2 8.8   PROT 6.6 6.2   ALBUMIN 2.6* 2.3*   BILITOT 0.3 0.2   ALKPHOS 69 62   AST 12 13   ALT 8* 7*   ANIONGAP 11 8   EGFRNONAA 40* 49*       Significant Imaging:   Imaging Results          X-Ray Wrist Complete Left (Final result)  Result time 12/23/19 12:25:33    Final result by Kristopher Avila MD (12/23/19 12:25:33)                 Impression:      Osteoarthritis.  No acute bony abnormality      Electronically signed by: Kristopher Avila  Date:    12/23/2019  Time:    12:25             Narrative:    EXAMINATION:  XR WRIST COMPLETE 3 VIEWS LEFT    CLINICAL HISTORY:  Pain in unspecified wrist    TECHNIQUE:  Standard radiography performed.    COMPARISON:  None    FINDINGS:  Advanced osteoarthritis involving the 1st metacarpophalangeal joint.  Mild osteoarthritis involving the radiocarpal compartment.  No acute fracture or dislocation.                                Assessment/Plan:      * Cellulitis of left wrist  Patient reports a history of staph infection in the past-  Add IV clindamycin  -IV steroids initiated   Monitor closely and if no improvement in a.m. consider upgrading antibiotics and or consult Infectious Disease  Left wrist x-ray showed Osteoarthritis.  No acute bony abnormality noted.  Blood cultures x2 pending  -pt encouraged to keep affected limb elevated with understanding verbalized   -leukocytosis resolved   -analgesia as needed   -neurovascular checks in progress   -CT of wrist reviewed   -US of LUE reviewed   -Uric acid within normal limits     Decreased GFR  -GFR 40>>49 and creatinine 1.3>>1.1  -IV hydration   -repeat CMP in am       Chronic pain syndrome  -hx of   -analgesia as needed       Chronic back pain  With chronic pain syndrome-  Continue home p.r.n. pain  medication      History of staph infection  -reported       Osteoarthritis of left wrist  P.r.n. pain medication      Debility  Fall and skin precautions  Consult physical therapy and Occupational therapy  -CM consulted for discharge planning       Hyponatremia  Monitor      Normocytic anemia  -stable  -downward trend noted  -will monitor and transfuse as needed  Continue multivitamin      Left wrist pain  Secondary to cellulitis and trauma   -pt states she hit her left wrist on a wall Sunday night with symptoms presenting Monday morning  Left wrist x-ray shows no signs of fracture or acute abnormality  Patient denies trauma  -analgesia prn       Seizure disorder  Continue home medications  Seizure precautions      COPD (chronic obstructive pulmonary disease)  Monitor O2 sats, supplement O2 as needed  Add q.i.d. DuoNeb      Hypertension associated with diabetes  Continue home medications  -ARB held due to soft BP       History of stroke  Continue home Plavix and Lopid      CAD in native artery  Telemetry  -BP remains stable   Continue home medications  -ARB held due to mild BP       DM (diabetes mellitus) type II controlled with renal manifestation  Diabetic diet  Accu-Cheks with correctional sliding scale insulin  Home metformin remains on hold        VTE Risk Mitigation (From admission, onward)         Ordered     enoxaparin injection 40 mg  Daily      12/23/19 1813     IP VTE HIGH RISK PATIENT  Once      12/23/19 1813     Place DAMIÁN hose  Until discontinued      12/23/19 4197                      Shelby Encarnacion NP  Department of Hospital Medicine   [facility]

## 2019-12-25 NOTE — PLAN OF CARE
1200 breathing treatment not given due to the pt stating that the medicine makes her cough too much. Will return for next scheduled treatment.

## 2019-12-25 NOTE — PLAN OF CARE
PT on room air with no respiratory distress at this time. She refused her scheduled treatment this AM because she says she doesn't like how the medicine makes her feel. She says she will consider taking the next scheduled treatment.  RT will try again.

## 2019-12-25 NOTE — ASSESSMENT & PLAN NOTE
Patient reports a history of staph infection in the past-  Add IV clindamycin  -IV steroids initiated   Monitor closely and if no improvement in a.m. consider upgrading antibiotics and or consult Infectious Disease  Left wrist x-ray showed Osteoarthritis.  No acute bony abnormality noted.  Blood cultures x2 pending  -pt encouraged to keep affected limb elevated with understanding verbalized   -leukocytosis resolved   -analgesia as needed   -neurovascular checks in progress   -CT of wrist reviewed   -US of LUE reviewed   -Uric acid within normal limits

## 2019-12-26 VITALS
WEIGHT: 127.88 LBS | OXYGEN SATURATION: 99 % | DIASTOLIC BLOOD PRESSURE: 67 MMHG | BODY MASS INDEX: 21.83 KG/M2 | TEMPERATURE: 98 F | HEART RATE: 89 BPM | HEIGHT: 64 IN | SYSTOLIC BLOOD PRESSURE: 131 MMHG | RESPIRATION RATE: 18 BRPM

## 2019-12-26 PROBLEM — Z86.19 HISTORY OF STAPH INFECTION: Status: RESOLVED | Noted: 2019-12-23 | Resolved: 2019-12-26

## 2019-12-26 PROBLEM — L03.114 CELLULITIS OF LEFT WRIST: Status: RESOLVED | Noted: 2019-12-23 | Resolved: 2019-12-26

## 2019-12-26 PROBLEM — E87.1 HYPONATREMIA: Status: RESOLVED | Noted: 2019-12-23 | Resolved: 2019-12-26

## 2019-12-26 PROBLEM — M25.532 LEFT WRIST PAIN: Status: RESOLVED | Noted: 2019-12-23 | Resolved: 2019-12-26

## 2019-12-26 LAB
ALBUMIN SERPL BCP-MCNC: 2.4 G/DL (ref 3.5–5.2)
ALP SERPL-CCNC: 55 U/L (ref 55–135)
ALT SERPL W/O P-5'-P-CCNC: 7 U/L (ref 10–44)
ANION GAP SERPL CALC-SCNC: 10 MMOL/L (ref 8–16)
AST SERPL-CCNC: 12 U/L (ref 10–40)
BASOPHILS # BLD AUTO: 0.01 K/UL (ref 0–0.2)
BASOPHILS NFR BLD: 0.1 % (ref 0–1.9)
BILIRUB SERPL-MCNC: 0.2 MG/DL (ref 0.1–1)
BUN SERPL-MCNC: 19 MG/DL (ref 8–23)
CALCIUM SERPL-MCNC: 9.3 MG/DL (ref 8.7–10.5)
CHLORIDE SERPL-SCNC: 112 MMOL/L (ref 95–110)
CO2 SERPL-SCNC: 18 MMOL/L (ref 23–29)
CREAT SERPL-MCNC: 0.8 MG/DL (ref 0.5–1.4)
DIFFERENTIAL METHOD: ABNORMAL
EOSINOPHIL # BLD AUTO: 0 K/UL (ref 0–0.5)
EOSINOPHIL NFR BLD: 0.3 % (ref 0–8)
ERYTHROCYTE [DISTWIDTH] IN BLOOD BY AUTOMATED COUNT: 14.9 % (ref 11.5–14.5)
EST. GFR  (AFRICAN AMERICAN): >60 ML/MIN/1.73 M^2
EST. GFR  (NON AFRICAN AMERICAN): >60 ML/MIN/1.73 M^2
GLUCOSE SERPL-MCNC: 116 MG/DL (ref 70–110)
HCT VFR BLD AUTO: 26.2 % (ref 37–48.5)
HGB BLD-MCNC: 7.9 G/DL (ref 12–16)
IMM GRANULOCYTES # BLD AUTO: 0.05 K/UL (ref 0–0.04)
IMM GRANULOCYTES NFR BLD AUTO: 0.6 % (ref 0–0.5)
LYMPHOCYTES # BLD AUTO: 1.9 K/UL (ref 1–4.8)
LYMPHOCYTES NFR BLD: 21.8 % (ref 18–48)
MCH RBC QN AUTO: 28.4 PG (ref 27–31)
MCHC RBC AUTO-ENTMCNC: 30.2 G/DL (ref 32–36)
MCV RBC AUTO: 94 FL (ref 82–98)
MONOCYTES # BLD AUTO: 0.8 K/UL (ref 0.3–1)
MONOCYTES NFR BLD: 8.4 % (ref 4–15)
NEUTROPHILS # BLD AUTO: 6.1 K/UL (ref 1.8–7.7)
NEUTROPHILS NFR BLD: 68.8 % (ref 38–73)
NRBC BLD-RTO: 0 /100 WBC
PLATELET # BLD AUTO: 312 K/UL (ref 150–350)
PMV BLD AUTO: 9.5 FL (ref 9.2–12.9)
POCT GLUCOSE: 85 MG/DL (ref 70–110)
POTASSIUM SERPL-SCNC: 4.1 MMOL/L (ref 3.5–5.1)
PROT SERPL-MCNC: 6.3 G/DL (ref 6–8.4)
RBC # BLD AUTO: 2.78 M/UL (ref 4–5.4)
SODIUM SERPL-SCNC: 140 MMOL/L (ref 136–145)
WBC # BLD AUTO: 8.91 K/UL (ref 3.9–12.7)

## 2019-12-26 PROCEDURE — 94760 N-INVAS EAR/PLS OXIMETRY 1: CPT

## 2019-12-26 PROCEDURE — 25000003 PHARM REV CODE 250: Performed by: INTERNAL MEDICINE

## 2019-12-26 PROCEDURE — 25000003 PHARM REV CODE 250: Performed by: NURSE PRACTITIONER

## 2019-12-26 PROCEDURE — 36415 COLL VENOUS BLD VENIPUNCTURE: CPT

## 2019-12-26 PROCEDURE — 80053 COMPREHEN METABOLIC PANEL: CPT

## 2019-12-26 PROCEDURE — 63600175 PHARM REV CODE 636 W HCPCS: Performed by: INTERNAL MEDICINE

## 2019-12-26 PROCEDURE — S0077 INJECTION, CLINDAMYCIN PHOSP: HCPCS | Performed by: NURSE PRACTITIONER

## 2019-12-26 PROCEDURE — 85025 COMPLETE CBC W/AUTO DIFF WBC: CPT

## 2019-12-26 RX ORDER — LINEZOLID 600 MG/1
600 TABLET, FILM COATED ORAL EVERY 12 HOURS
Qty: 10 TABLET | Refills: 0 | Status: SHIPPED | OUTPATIENT
Start: 2019-12-26 | End: 2019-12-31

## 2019-12-26 RX ADMIN — OXYBUTYNIN CHLORIDE 10 MG: 5 TABLET, EXTENDED RELEASE ORAL at 08:12

## 2019-12-26 RX ADMIN — CLINDAMYCIN IN 5 PERCENT DEXTROSE 900 MG: 18 INJECTION, SOLUTION INTRAVENOUS at 03:12

## 2019-12-26 RX ADMIN — PANTOPRAZOLE SODIUM 40 MG: 40 TABLET, DELAYED RELEASE ORAL at 08:12

## 2019-12-26 RX ADMIN — AMLODIPINE BESYLATE 10 MG: 10 TABLET ORAL at 08:12

## 2019-12-26 RX ADMIN — THERA TABS 1 TABLET: TAB at 08:12

## 2019-12-26 RX ADMIN — LACTOBACILLUS TAB 1 TABLET: TAB at 07:12

## 2019-12-26 RX ADMIN — GEMFIBROZIL 600 MG: 600 TABLET ORAL at 08:12

## 2019-12-26 RX ADMIN — METHYLPREDNISOLONE SODIUM SUCCINATE 40 MG: 40 INJECTION, POWDER, FOR SOLUTION INTRAMUSCULAR; INTRAVENOUS at 08:12

## 2019-12-26 RX ADMIN — CLOPIDOGREL BISULFATE 75 MG: 75 TABLET ORAL at 08:12

## 2019-12-26 RX ADMIN — DILTIAZEM HYDROCHLORIDE 120 MG: 120 CAPSULE, COATED, EXTENDED RELEASE ORAL at 08:12

## 2019-12-26 RX ADMIN — CLINDAMYCIN IN 5 PERCENT DEXTROSE 900 MG: 18 INJECTION, SOLUTION INTRAVENOUS at 10:12

## 2019-12-26 RX ADMIN — CETIRIZINE HYDROCHLORIDE 10 MG: 10 TABLET, FILM COATED ORAL at 08:12

## 2019-12-26 RX ADMIN — DULOXETINE HYDROCHLORIDE 30 MG: 30 CAPSULE, DELAYED RELEASE ORAL at 08:12

## 2019-12-26 RX ADMIN — METOPROLOL TARTRATE 25 MG: 25 TABLET ORAL at 08:12

## 2019-12-26 RX ADMIN — SENNOSIDES 8.6 MG: 8.6 TABLET, FILM COATED ORAL at 08:12

## 2019-12-26 RX ADMIN — RANOLAZINE 1000 MG: 500 TABLET, FILM COATED, EXTENDED RELEASE ORAL at 08:12

## 2019-12-26 RX ADMIN — ESCITALOPRAM OXALATE 20 MG: 10 TABLET ORAL at 08:12

## 2019-12-26 NOTE — PLAN OF CARE
Dec30 Adult Established Patient with E Brody Chaudhari Jr, MD   Monday Dec 30, 2019 7:40 AM   Arrive at check-in approximately 15 minutes before your scheduled appointment time. Bring all outside medical records and imaging, along with a list of your current medications and insurance card.  2nd Floor - From [address], take the [facility] exit ([address]). Enter the facility from the [address]  [facility]   [address]  [place] [id number] [phone]      Patient declined home health and a bedside commode     12/26/19 1336   Final Note   Assessment Type Final Discharge Note   Anticipated Discharge Disposition Home   Hospital Follow Up  Appt(s) scheduled? Yes   Right Care Referral Info   Post Acute Recommendation No Care

## 2019-12-26 NOTE — PLAN OF CARE
CM met with patient at the bedside to discuss the discharge plan.  Patient states that she does not want home health because she will be continuing with her outpatient therapy.  CM asked about equipment needs, specifically bedside commode, and patient states that she already has the equipment she needs.  She has a bedside commode that is a loaner so CM asked if she would like to receive her own BSC.  Patient states that she will discuss it with her daughter and if she decides to get her own BSC, she will contact CM.  Patient has no needs at this time.     12/26/19 0912   Discharge Reassessment   Assessment Type Discharge Planning Reassessment   Provided patient/caregiver education on the expected discharge date and the discharge plan Yes   Do you have any problems affording any of your prescribed medications? No   Discharge Plan A Home   DME Needed Upon Discharge  none   Patient choice form signed by patient/caregiver N/A   Anticipated Discharge Disposition Home   Can the patient answer the patient profile reliably? Yes, cognitively intact

## 2019-12-26 NOTE — NURSING
Patient D/C from facility via family/friend transport AAOx4, in no acute distress or discomfort. Discharge paperwork recieved, reviewed and understood by patient as per pt. verbal indication. NNO follows at this time.

## 2019-12-26 NOTE — DISCHARGE SUMMARY
[facility] Hospital Medicine  Discharge Summary      Patient Name: Shireen Felix  MRN: [id number]  Admission Date: 12/23/2019  Hospital Length of Stay: 2 days  Discharge Date and Time:  12/26/2019 7:50 AM  Attending Physician: Stephenie Solano MD   Discharging Provider: Stephenie Solano MD  Primary Care Provider: POOL Chaudhari Jr, MD      HPI:   This is a 74 y.o. female patient with a PMHx of DM, HTN, stroke, seizures, COPD, and CAD who presents to the Emergency Department for evaluation of left wrist pain which onset gradually earlier today. She notes worsening symptoms over the last couple of hours.  Patient denies any trauma or injury. Symptoms are constant and moderate in severity. No mitigating or exacerbating factors reported. Associated symptoms include increased swelling and erythema of left wrist. Patient denies any fever, chills, shortness of breath, chest pain, nausea, vomiting, dizziness, extremity weakness/numbness, or any other sx symptoms at this time. No prior treatment. No further complaints or concerns at this time.     Patient reports chronic intermittent  left wrist pain which she attributes to arthritis. She states this morning all of a sudden there was new onset redness, warmth, and swelling. Patient evaluated in ER and noted to have left wrist cellulitis.  Patient reports she has a history of staph infection in past and has been placed in the hospital for further evaluation treatment.  Uric acid level checked in the emergency room was within normal limits.    * No surgery found *      Hospital Course:   Pt admitted to Observation Unit for Cellulitis of left wrist.  Xray showed osteoarthritis with no acute bony abnormality.  Pt treated with IV antibiotics, analgesia as needed, and IV steroids x 2 doses.  Leukocytosis resolved prior initiation of IV steroids.  Pt instructed to elevated affected limb.  Mild HARESH noted with IV hydration given and ARB held with .  H/H stable.   On 12/25/19, pt concerned about increased swelling and coolness to fingertips.  CT of left wrist showed cellulitis of the left wrist and hand with no drainable fluid collection and severe osteoarthritis of the 1st carpometacarpal joint.  No fracture or malalignment.  US of LUE showed normal study.  Neurovascular checks in progress. Uric acid normal. Kidney function improved.  Current plan of care continued.        12/26/19  Admitted with left and cellultis with osteoarthritic flare   She improved with antibiotics and steroids   Hospitalization was also significant for acute renal failure due to prerenal  Which improved with IVF   Patietn seen and examined , stable for discharge     Consults:   Consults (From admission, onward)        Status Ordering Provider     IP consult to case management  Once     Provider:  (Not yet assigned)    JOSH Hunter          No new Assessment & Plan notes have been filed under this hospital service since the last note was generated.  Service: Hospital Medicine    Final Active Diagnoses:    Diagnosis Date Noted POA    PRINCIPAL PROBLEM:  Cellulitis of left wrist [L03.114] 12/23/2019 Yes    Decreased GFR [R94.4] 12/24/2019 Unknown    History of stroke [Z86.73] 12/23/2019 Not Applicable    Hypertension associated with diabetes [E11.59, I10] 12/23/2019 Yes    COPD (chronic obstructive pulmonary disease) [J44.9] 12/23/2019 Yes    Seizure disorder [G40.909] 12/23/2019 Yes    Left wrist pain [M25.532] 12/23/2019 Yes    Normocytic anemia [D64.9] 12/23/2019 Yes    Hyponatremia [E87.1] 12/23/2019 Yes    Debility [R53.81] 12/23/2019 Yes    Osteoarthritis of left wrist [M19.032] 12/23/2019 Yes    History of staph infection [Z86.19] 12/23/2019 Yes    Chronic back pain [M54.9, G89.29] 12/23/2019 Yes    Chronic pain syndrome [G89.4] 12/23/2019 Yes    CAD in native artery [I25.10] 08/05/2019 Yes    DM (diabetes mellitus) type II controlled with renal manifestation  "[E11.29] 08/04/2019 Yes      Problems Resolved During this Admission:       Discharged Condition: good    Disposition:     Follow Up:  Follow-up Information     E Brody Chaudhari Jr, MD. Call in 2 days.    Specialties:  Internal Medicine, Pediatrics  Contact information:  [address]  [place] [address]  [phone]                 Patient Instructions:      COMMODE FOR HOME USE     Order Specific Question Answer Comments   Type: Standard    Height: 5' 4" (1.626 m)    Weight: 55.6 kg (122 lb 9.2 oz)    Does patient have medical equipment at home? wheelchair    Does patient have medical equipment at home? walker, rolling    Does patient have medical equipment at home? bedside commode    Does patient have medical equipment at home? shower chair    Length of need (1-99 months): 99        Significant Diagnostic Studies: Labs:   BMP:   Recent Labs   Lab 12/25/19 0337 12/26/19 0338   * 116*    140   K 4.3 4.1   * 112*   CO2 17* 18*   BUN 16 19   CREATININE 1.1 0.8   CALCIUM 8.8 9.3   MG 1.9  --    , CMP   Recent Labs   Lab 12/25/19 0337 12/26/19 0338    140   K 4.3 4.1   * 112*   CO2 17* 18*   * 116*   BUN 16 19   CREATININE 1.1 0.8   CALCIUM 8.8 9.3   PROT 6.2 6.3   ALBUMIN 2.3* 2.4*   BILITOT 0.2 0.2   ALKPHOS 62 55   AST 13 12   ALT 7* 7*   ANIONGAP 8 10   ESTGFRAFRICA 57* >60   EGFRNONAA 49* >60    and CBC   Recent Labs   Lab 12/25/19 0337 12/26/19 0338   WBC 7.29 8.91   HGB 8.1* 7.9*   HCT 26.2* 26.2*    312       Pending Diagnostic Studies:     None         Medications:  Reconciled Home Medications:      Medication List      START taking these medications    linezolid 600 mg Tab  Commonly known as:  ZYVOX  Take 1 tablet (600 mg total) by mouth every 12 (twelve) hours. for 10 days     traMADol 50 mg tablet  Commonly known as:  ULTRAM  Take 1 tablet (50 mg total) by mouth every 6 (six) hours as needed.        ASK your doctor about these medications  "   albuterol 90 mcg/actuation inhaler  Commonly known as:  PROVENTIL/VENTOLIN HFA  Inhale 2 puffs into the lungs every 4 (four) hours as needed.     albuterol-ipratropium 2.5 mg-0.5 mg/3 mL nebulizer solution  Commonly known as:  DUO-NEB  Take 3 mLs by nebulization every 6 (six) hours as needed for Wheezing. Rescue     amLODIPine 10 MG tablet  Commonly known as:  NORVASC  Take 1 tablet (10 mg total) by mouth once daily.     Aptiom 600 mg Tab  Generic drug:  eslicarbazepine  Take 1 tablet by mouth every evening.     Breo Ellipta 200-25 mcg/dose Dsdv diskus inhaler  Generic drug:  fluticasone furoate-vilanterol  Inhale 1 puff into the lungs every evening.     cholecalciferol (vitamin D3) 25 mcg (1,000 unit) capsule  Commonly known as:  Vitamin D3  Take 10 capsules (10,000 Units total) by mouth once daily. Twice weekly     clopidogrel 75 mg tablet  Commonly known as:  PLAVIX  Take 1 tablet (75 mg total) by mouth once daily.     dexlansoprazole 60 mg capsule  Commonly known as:  DEXILANT  Take 1 capsule (60 mg total) by mouth once daily.     diltiaZEM 120 mg 24 hr tablet  Commonly known as:  CARDIZEM LA  Take 1 tablet (120 mg total) by mouth once daily.     DULoxetine 30 MG capsule  Commonly known as:  CYMBALTA  Take 1 capsule (30 mg total) by mouth once daily.     escitalopram oxalate 20 MG tablet  Commonly known as:  LEXAPRO  Take 1 tablet (20 mg total) by mouth once daily.     gemfibrozil 600 MG tablet  Commonly known as:  LOPID  Take 1 tablet (600 mg total) by mouth once daily.     GI COCKTAIL SIMPLE RECORD  Take 50 mLs by mouth 2 (two) times daily.     loratadine 10 mg tablet  Commonly known as:  CLARITIN  Take 1 tablet by mouth once daily.     metFORMIN 500 MG tablet  Commonly known as:  GLUCOPHAGE  Take 1 tablet (500 mg total) by mouth 2 (two) times daily.     metoprolol tartrate 25 MG tablet  Commonly known as:  LOPRESSOR  TAKE 1 TABLET BY MOUTH TWICE DAILY WITH FOOD FOR HEART AND BLOOD PRESSURE      oxyCODONE-acetaminophen 5-325 mg per tablet  Commonly known as:  PERCOCET  Take 1 tablet by mouth every 6 (six) hours as needed for Pain.     PROBIOTIC ACIDOPHILUS ORAL  Take 1 capsule by mouth 2 (two) times daily.     Prolia 60 mg/mL Syrg  Generic drug:  denosumab  every 6 (six) months.     ranitidine 150 MG tablet  Commonly known as:  ZANTAC  TAKE 1 TABLET BY MOUTH TWICE DAILY STOMACH ACID     ranolazine 1,000 mg Tb12  Commonly known as:  RANEXA  Take 1 tablet (1,000 mg total) by mouth 2 (two) times daily.     senna 8.6 mg Cap  Generic drug:  sennosides  Take by mouth as needed.     temazepam 15 mg Cap  Commonly known as:  RESTORIL  Take 1 capsule (15 mg total) by mouth every evening.     tiotropium 18 mcg inhalation capsule  Commonly known as:  SPIRIVA  Inhale 1 capsule (18 mcg total) into the lungs once daily.     tolterodine 4 MG 24 hr capsule  Commonly known as:  DETROL LA  Take 4 mg by mouth once daily.     valsartan 320 MG tablet  Commonly known as:  DIOVAN  Take 1 tablet (320 mg total) by mouth once daily.     venlafaxine 150 mg Tr24  Take 150 mg by mouth once daily.            Indwelling Lines/Drains at time of discharge:   Lines/Drains/Airways     None                 Time spent on the discharge of patient: 40  minutes  Patient was seen and examined on the date of discharge and determined to be suitable for discharge.         Stephenie Solano MD  Department of Hospital Medicine  [facility]

## 2019-12-26 NOTE — PLAN OF CARE
12/26/19 0912   Medicare Message   Important Message from Medicare regarding Discharge Appeal Rights Given to patient/caregiver;Explained to patient/caregiver;Signed/date by patient/caregiver   Date IMM was signed 12/26/19   Time IMM was signed 0912

## 2019-12-26 NOTE — PLAN OF CARE
Pt refused scheduled treatment. She states that she will refuse all treatments today and that she wants her treatments cancelled. MD notified.

## 2019-12-26 NOTE — PLAN OF CARE
Bed alarm activated due to history of falls. VSS. IV ABX during shift.  Fall precautions in place. Call light and personal items within reach. Educated patient on side effects of medication administered. Pt verbalized understanding. 24 hour order check done.  Will continue to monitor.

## 2019-12-27 ENCOUNTER — PATIENT OUTREACH (OUTPATIENT)
Dept: ADMINISTRATIVE | Facility: CLINIC | Age: 74
End: 2019-12-27

## 2019-12-27 ENCOUNTER — TELEPHONE (OUTPATIENT)
Dept: INTERNAL MEDICINE | Facility: CLINIC | Age: 74
End: 2019-12-27

## 2019-12-27 RX ORDER — CLINDAMYCIN HYDROCHLORIDE 300 MG/1
300 CAPSULE ORAL 2 TIMES DAILY
COMMUNITY
End: 2020-01-17

## 2019-12-27 NOTE — TELEPHONE ENCOUNTER
After receiving the message that was supposed to be informing staff that the pt has been discharged from a recent ER visit and needs a f/u appt . I researched an see that there is an appt already in place so there is no need to reschedule . I did contact the pt to verify she was aware of the scheduled appt. She verbalized understanding . //kah

## 2019-12-27 NOTE — PATIENT INSTRUCTIONS
Discharge Instructions for Cellulitis (Child)  Your child was diagnosed with cellulitis. This is an infection that occurs at the deepest layer of the skin. Cellulitis is caused by bacteria. Bacteria can get into the body through broken skin, such as a cut, scratch, sore, animal bite, or through a rash that causes a break in the skin. Your child was treated in the hospital with IV antibiotics. Below are instructions for caring for your child at home.  Home care  · Elevate your childs wound if possible. This will help keep the swelling down.  · Wash your hands before and after touching any cuts, scratches, or bandages to prevent infections.  · Keep the infected area clean.  · Apply clean bandages or gauze dressings as directed by your childs healthcare provider.  · Be sure your child finishes all the medicine that was prescribed. If your child doesnt finish the medicine, the infection may return. Not finishing the medicine can also make any future infections harder to treat.  · Give your child a pain reliever as directed by your healthcare provider. Ask whether an over-the-counter pain reliever is appropriate. Also ask for instructions on the right dose for your childs age and weight.  · If your child feels warm or seems feverish, measure your child's temperature. Be sure to tell your child's healthcare provider exactly where you measured the temperature (mouth, rectum, or under the arm).  Follow-up  Make a follow-up appointment as directed by your childs healthcare provider.   When to call your childs healthcare provider  Call your healthcare provider right away if your child has any of the following:  · Difficulty or pain when moving the joints above or below the infected area  · Discharge or pus draining from the area  · Fever of 100.4°F (38°C) or higher, or as directed by your child's healthcare provider  · Shaking chills  · Pain or redness that gets worse in or around the infected area, especially if the  area of redness gets larger  · Swelling in the infected area  · Vomiting   Date Last Reviewed: 8/1/2016  © 0689-1949 [employer], [employer]. [address] [place] [address], [place] [address]. All rights reserved. This information is not intended as a substitute for professional medical care. Always follow your healthcare professional's instructions.

## 2019-12-28 LAB
BACTERIA BLD CULT: NORMAL
BACTERIA BLD CULT: NORMAL

## 2019-12-30 ENCOUNTER — HOSPITAL ENCOUNTER (OUTPATIENT)
Dept: RADIOLOGY | Facility: HOSPITAL | Age: 74
Discharge: HOME | End: 2019-12-30
Attending: PEDIATRICS
Payer: MEDICARE

## 2019-12-30 ENCOUNTER — OFFICE VISIT (OUTPATIENT)
Dept: INTERNAL MEDICINE | Facility: CLINIC | Age: 74
End: 2019-12-30
Payer: MEDICARE

## 2019-12-30 VITALS
OXYGEN SATURATION: 99 % | HEIGHT: 64 IN | HEART RATE: 89 BPM | WEIGHT: 130.31 LBS | RESPIRATION RATE: 16 BRPM | BODY MASS INDEX: 22.25 KG/M2 | SYSTOLIC BLOOD PRESSURE: 130 MMHG | DIASTOLIC BLOOD PRESSURE: 80 MMHG | TEMPERATURE: 97 F

## 2019-12-30 DIAGNOSIS — D50.8 OTHER IRON DEFICIENCY ANEMIA: ICD-10-CM

## 2019-12-30 DIAGNOSIS — J34.89 NASAL SEPTAL PERFORATION: ICD-10-CM

## 2019-12-30 DIAGNOSIS — I10 ESSENTIAL HYPERTENSION: ICD-10-CM

## 2019-12-30 DIAGNOSIS — M19.032 PRIMARY OSTEOARTHRITIS OF LEFT WRIST: Primary | ICD-10-CM

## 2019-12-30 DIAGNOSIS — J41.1 MUCOPURULENT CHRONIC BRONCHITIS: Chronic | ICD-10-CM

## 2019-12-30 DIAGNOSIS — E11.22 CONTROLLED TYPE 2 DIABETES MELLITUS WITH STAGE 3 CHRONIC KIDNEY DISEASE, WITHOUT LONG-TERM CURRENT USE OF INSULIN: ICD-10-CM

## 2019-12-30 DIAGNOSIS — L03.114 CELLULITIS OF LEFT WRIST: ICD-10-CM

## 2019-12-30 DIAGNOSIS — N18.30 CONTROLLED TYPE 2 DIABETES MELLITUS WITH STAGE 3 CHRONIC KIDNEY DISEASE, WITHOUT LONG-TERM CURRENT USE OF INSULIN: ICD-10-CM

## 2019-12-30 DIAGNOSIS — Z86.73 HX OF COMPLETED STROKE: ICD-10-CM

## 2019-12-30 PROCEDURE — 70220 X-RAY EXAM OF SINUSES: CPT | Mod: TC

## 2019-12-30 PROCEDURE — 99215 OFFICE O/P EST HI 40 MIN: CPT | Mod: S$PBB,,, | Performed by: PEDIATRICS

## 2019-12-30 PROCEDURE — 1159F MED LIST DOCD IN RCRD: CPT | Mod: ,,, | Performed by: PEDIATRICS

## 2019-12-30 PROCEDURE — 70220 X-RAY EXAM OF SINUSES: CPT | Mod: 26,,, | Performed by: RADIOLOGY

## 2019-12-30 PROCEDURE — 70220 XR SINUSES MIN 3 VIEWS: ICD-10-PCS | Mod: 26,,, | Performed by: RADIOLOGY

## 2019-12-30 PROCEDURE — 99999 PR PBB SHADOW E&M-EST. PATIENT-LVL IV: CPT | Mod: PBBFAC,,, | Performed by: PEDIATRICS

## 2019-12-30 PROCEDURE — 1159F PR MEDICATION LIST DOCUMENTED IN MEDICAL RECORD: ICD-10-PCS | Mod: ,,, | Performed by: PEDIATRICS

## 2019-12-30 PROCEDURE — 99214 OFFICE O/P EST MOD 30 MIN: CPT | Mod: PBBFAC,25 | Performed by: PEDIATRICS

## 2019-12-30 PROCEDURE — 99215 PR OFFICE/OUTPT VISIT, EST, LEVL V, 40-54 MIN: ICD-10-PCS | Mod: S$PBB,,, | Performed by: PEDIATRICS

## 2019-12-30 PROCEDURE — 99999 PR PBB SHADOW E&M-EST. PATIENT-LVL IV: ICD-10-PCS | Mod: PBBFAC,,, | Performed by: PEDIATRICS

## 2019-12-30 PROCEDURE — 1125F PR PAIN SEVERITY QUANTIFIED, PAIN PRESENT: ICD-10-PCS | Mod: ,,, | Performed by: PEDIATRICS

## 2019-12-30 PROCEDURE — 1125F AMNT PAIN NOTED PAIN PRSNT: CPT | Mod: ,,, | Performed by: PEDIATRICS

## 2019-12-30 NOTE — PROGRESS NOTES
Subjective:       Patient ID: Shireen Felix is a 74 y.o. female.    Chief Complaint: Hospital Follow Up    With brother    Recent bout of cellulitis of right wrist reviewed with them. She is much better, near baseline arthritic pain. Finishing antibiotics.    DM: BS normal on occasional checking several times a week. No hyper/hypoglycemic symptoms. No lows.  Seizures: none  Hx CVA: stable. Ongoing PT,  Improving with ambulation and fall risk. Still largely immobile.  CAD: no CP  COPD: stable SOB/TURCIOS  GERD: quiet  Chronic pain and DJD: overall doing well despite daily pain, off narcotics.    LABS REVIEWED AND DISCUSSED WITH PATIENT       Review of Systems   Constitutional: Negative for fever and unexpected weight change.   HENT: Positive for congestion, postnasal drip and rhinorrhea.         Chronic nasal pain and irritation   Eyes: Negative for discharge and redness.   Respiratory: Positive for cough and shortness of breath. Negative for wheezing.         Stable, mild   Cardiovascular: Negative for chest pain, palpitations and leg swelling.   Gastrointestinal: Negative for abdominal pain, constipation, diarrhea and vomiting.   Endocrine: Negative for polydipsia, polyphagia and polyuria.   Genitourinary: Negative for decreased urine volume, difficulty urinating and menstrual problem.   Musculoskeletal: Positive for arthralgias, back pain, gait problem, myalgias, neck pain and neck stiffness. Negative for joint swelling.        Chronic and overall stable   Skin: Negative for rash and wound.   Neurological: Negative for syncope and headaches.   Psychiatric/Behavioral: Negative for behavioral problems, dysphoric mood, self-injury, sleep disturbance and suicidal ideas. The patient is not hyperactive.        Objective:      Physical Exam   Constitutional: She is oriented to person, place, and time. She appears well-developed. No distress.   Thin chronically ill. In wheelchair   Neck: No JVD present. No thyromegaly  present.   Cardiovascular: Normal rate, regular rhythm and normal heart sounds.   No murmur heard.  Pulmonary/Chest: Effort normal and breath sounds normal. No respiratory distress. She has no wheezes. She has no rales.   Abdominal: Soft. She exhibits no distension and no mass. There is no tenderness. There is no guarding.   Musculoskeletal: She exhibits no edema.   Foot hygiene was good, no ulcers, no onychomycosis, no tinea, monofilament intact   Lymphadenopathy:     She has no cervical adenopathy.   Neurological: She is alert and oriented to person, place, and time. No cranial nerve deficit. Coordination normal.   Skin: Capillary refill takes less than 2 seconds. No rash noted.   Old herpetic scars, left temple. Swelling of left wrist and fore arm nearly gone completely. ROM is tender but back to her baseline DJD.   Psychiatric: She has a normal mood and affect. Her behavior is normal. Judgment and thought content normal.       Assessment:       1. Primary osteoarthritis of left wrist    2. Cellulitis of left wrist    3. Essential hypertension    4. Other iron deficiency anemia    5. Hx of completed stroke    6. Mucopurulent chronic bronchitis    7. Controlled type 2 diabetes mellitus with stage 3 chronic kidney disease, without long-term current use of insulin    8. Nasal septal perforation        Plan:       Primary osteoarthritis of left wrist    Cellulitis of left wrist    Essential hypertension    Other iron deficiency anemia  -     CBC auto differential; Future; Expected date: 12/30/2019  -     CBC auto differential; Future; Expected date: 12/30/2019    Hx of completed stroke    Mucopurulent chronic bronchitis    Controlled type 2 diabetes mellitus with stage 3 chronic kidney disease, without long-term current use of insulin  -     Hemoglobin A1c; Future; Expected date: 12/30/2019  -     Microalbumin/creatinine urine ratio; Future; Expected date: 12/30/2019  -     Comprehensive metabolic panel; Future;  Expected date: 12/30/2019  -     Lipid panel; Future; Expected date: 12/30/2019    Nasal septal perforation  -     X-Ray Sinuses 3 or more views; Future; Expected date: 12/30/2019  -     Ambulatory consult to ENT    Her hospital labs reviewed. She has worsening of her chronic anemia. Repeat today, if not improved, see hematology. Suspect was due to acute illness combined with chronic disease state. In terms of her chronic illnesses, she and brother feel she is daily improving. Maintain PT, baseline meds, and subspecialty care. I defer upcoming cardiology appt as to statin use as I do not have history with her. F/U in 3 months with labs.

## 2019-12-31 ENCOUNTER — TELEPHONE (OUTPATIENT)
Dept: INTERNAL MEDICINE | Facility: CLINIC | Age: 74
End: 2019-12-31

## 2019-12-31 NOTE — TELEPHONE ENCOUNTER
----- Message from Archana Gibson sent at 12/31/2019 10:44 AM CST -----  Contact:  Rm Ojeda  .Type:  Patient Returning Call    Who Called:Rm  Who Left Message for Patient:Sayra  Does the patient know what this is regarding?:  Would the patient rather a call back or a response via [facility]? Call  Best Call Back Number:.[phone] (home) [phone] (work)    Additional Information:

## 2019-12-31 NOTE — TELEPHONE ENCOUNTER
----- Message from Jeanine Aviles sent at 12/31/2019  8:48 AM CST -----  Contact: Patients brother, Rm Abdalla needs to talk to nurse regarding the tests patient took yesterday. Please call him back at [phone]. Thank you

## 2020-01-02 DIAGNOSIS — M81.0 OSTEOPOROSIS, UNSPECIFIED OSTEOPOROSIS TYPE, UNSPECIFIED PATHOLOGICAL FRACTURE PRESENCE: Primary | ICD-10-CM

## 2020-01-02 RX ORDER — VIT C/E/ZN/COPPR/LUTEIN/ZEAXAN 250MG-90MG
1000 CAPSULE ORAL DAILY
Qty: 30 CAPSULE | Refills: 3 | Status: SHIPPED | OUTPATIENT
Start: 2020-01-02 | End: 2020-02-14

## 2020-01-02 NOTE — TELEPHONE ENCOUNTER
Informed pt's brother Rm peace approved by Dr. Chaudhari, brother voiced understanding and to CB PRN.

## 2020-01-02 NOTE — TELEPHONE ENCOUNTER
----- Message from Karen Royal sent at 1/2/2020  9:28 AM CST -----  Contact: alexandraer/Rm  Please call pt alexandraer @ [phone] regarding pt Vitamin D medication, brother have questions for Latasha.

## 2020-01-02 NOTE — TELEPHONE ENCOUNTER
S/w pt's brother Rm stating she was d/c from Rehab taking Vit D 1,000 unit twice wkly, and pt now needs refill since none at pharmacy. Advised pt and brother would send refill request to Dr. Chaudhari and let them know when we receive 's response, they voived understanding. Pt also asked how to resume Prolia injectioons, advised them to contact pt's Rheum Dr. Orozco's office to f/u with him since he prescibred that inj tx, pt and brother voiced understanding.    LV 12/30/19  NV 04/01/20    Last Vit D lab 06/04/19.

## 2020-01-06 ENCOUNTER — OFFICE VISIT (OUTPATIENT)
Dept: OTOLARYNGOLOGY | Facility: CLINIC | Age: 75
End: 2020-01-06
Payer: MEDICARE

## 2020-01-06 VITALS — TEMPERATURE: 98 F | DIASTOLIC BLOOD PRESSURE: 59 MMHG | SYSTOLIC BLOOD PRESSURE: 109 MMHG | HEART RATE: 80 BPM

## 2020-01-06 DIAGNOSIS — J34.89 NASAL SEPTUM PERFORATION: Primary | ICD-10-CM

## 2020-01-06 PROCEDURE — 99999 PR PBB SHADOW E&M-EST. PATIENT-LVL IV: CPT | Mod: PBBFAC,,, | Performed by: PHYSICIAN ASSISTANT

## 2020-01-06 PROCEDURE — 1125F PR PAIN SEVERITY QUANTIFIED, PAIN PRESENT: ICD-10-PCS | Mod: ,,, | Performed by: PHYSICIAN ASSISTANT

## 2020-01-06 PROCEDURE — 1159F PR MEDICATION LIST DOCUMENTED IN MEDICAL RECORD: ICD-10-PCS | Mod: ,,, | Performed by: PHYSICIAN ASSISTANT

## 2020-01-06 PROCEDURE — 1125F AMNT PAIN NOTED PAIN PRSNT: CPT | Mod: ,,, | Performed by: PHYSICIAN ASSISTANT

## 2020-01-06 PROCEDURE — 99214 OFFICE O/P EST MOD 30 MIN: CPT | Mod: PBBFAC | Performed by: PHYSICIAN ASSISTANT

## 2020-01-06 PROCEDURE — 99213 OFFICE O/P EST LOW 20 MIN: CPT | Mod: S$PBB,,, | Performed by: PHYSICIAN ASSISTANT

## 2020-01-06 PROCEDURE — 99213 PR OFFICE/OUTPT VISIT, EST, LEVL III, 20-29 MIN: ICD-10-PCS | Mod: S$PBB,,, | Performed by: PHYSICIAN ASSISTANT

## 2020-01-06 PROCEDURE — 1159F MED LIST DOCD IN RCRD: CPT | Mod: ,,, | Performed by: PHYSICIAN ASSISTANT

## 2020-01-06 PROCEDURE — 99999 PR PBB SHADOW E&M-EST. PATIENT-LVL IV: ICD-10-PCS | Mod: PBBFAC,,, | Performed by: PHYSICIAN ASSISTANT

## 2020-01-06 NOTE — PROGRESS NOTES
Subjective:       Patient ID: Shireen Felix is a 74 y.o. female.    Chief Complaint: Other (nasal performation)    Patient is a very pleasant 73 year old female here to see me today in followup for evaluation of her nose.  She has a known septal perforation. She believes that hole is larger than before.  She is now using saline several times daily, she is not always consistent with its use.  She is using Neosporin at times as well. She states that it is often infected but not now because she has been on antibiotics for wrist cellulitis.  She has not had any recent nose bleeds, was a previous issue for her but that has improved.  She has not seen Rheumatology, she has recently seen Neurology and was recommended to see Rheumatology.  She has a history of shingles to the left side of her face.  She is currently on prednisone as per Neurology.  She is continuing to have pain in the left forehead and scalp extending to the nose and midface.    Review of Systems   Constitutional: Negative for chills, fatigue, fever and unexpected weight change.   HENT: Positive for congestion, postnasal drip and rhinorrhea. Negative for dental problem, ear discharge, ear pain, facial swelling, hearing loss, nosebleeds, sinus pressure, sneezing, sore throat, tinnitus, trouble swallowing and voice change.    Eyes: Negative for redness, itching and visual disturbance.   Respiratory: Negative for cough, choking, shortness of breath and wheezing.    Cardiovascular: Negative for chest pain and palpitations.   Gastrointestinal: Negative for abdominal pain.        No reflux.   Musculoskeletal: Negative for gait problem.   Skin: Negative for rash.   Neurological: Positive for seizures, light-headedness and headaches. Negative for dizziness.       Objective:      Physical Exam   Constitutional: She is oriented to person, place, and time. She appears well-developed and well-nourished. No distress.   HENT:   Head: Normocephalic and atraumatic.    Right Ear: Tympanic membrane, external ear and ear canal normal.   Left Ear: Tympanic membrane, external ear and ear canal normal.   Nose: Nose normal. No mucosal edema, rhinorrhea, nasal deformity or septal deviation. No epistaxis. Right sinus exhibits no maxillary sinus tenderness and no frontal sinus tenderness. Left sinus exhibits no maxillary sinus tenderness and no frontal sinus tenderness.   Mouth/Throat: Uvula is midline, oropharynx is clear and moist and mucous membranes are normal. Mucous membranes are not pale and not dry. No dental caries. No oropharyngeal exudate or posterior oropharyngeal erythema.   1 cm anterior septal perforation with smooth borders, no active bleeding or crusting   Eyes: Pupils are equal, round, and reactive to light. Conjunctivae, EOM and lids are normal. Right eye exhibits no chemosis. Left eye exhibits no chemosis. Right conjunctiva is not injected. Left conjunctiva is not injected. No scleral icterus. Right eye exhibits normal extraocular motion and no nystagmus. Left eye exhibits normal extraocular motion and no nystagmus.   Neck: Trachea normal and phonation normal. No tracheal tenderness present. No tracheal deviation present. No thyroid mass and no thyromegaly present.   Cardiovascular: Intact distal pulses.   Pulmonary/Chest: Effort normal. No stridor. No respiratory distress.   Abdominal: She exhibits no distension.   Lymphadenopathy:        Head (right side): No submental, no submandibular, no preauricular, no posterior auricular and no occipital adenopathy present.        Head (left side): No submental, no submandibular, no preauricular, no posterior auricular and no occipital adenopathy present.     She has no cervical adenopathy.   Neurological: She is alert and oriented to person, place, and time. No cranial nerve deficit.   Skin: Skin is warm and dry. No rash noted. No erythema.   Psychiatric: She has a normal mood and affect. Her behavior is normal.        Assessment:       1. Nasal septum perforation        Plan:       Nasal Septal Perforation: will place an ambulatory referral for her to see Dr. Ha Peterson.  Return to clinic as needed. No antibiotics warranted at this time.

## 2020-01-06 NOTE — LETTER
January 6, 2020      [place]  [address]  [place] [address]           [facility]  [address] [place] [address]  [place] [address]  Phone: [phone]  Fax: [phone]          Patient: Shireen Felix   MR Number: [id number]   YOB: 1945   Date of Visit: 1/6/2020       Dear Dr. CARLEE Chaudhari Jr.:    Thank you for referring Shireen Felix to me for evaluation. Attached you will find relevant portions of my assessment and plan of care.    If you have questions, please do not hesitate to call me. I look forward to following Shireen Felix along with you.    Sincerely,    Yamilet Dalal PA-C    Enclosure  CC:  No Recipients    If you would like to receive this communication electronically, please contact [email or web address] or [phone] to request more information on [employer] Link access.    For providers and/or their staff who would like to refer a patient to [facility], please contact us through our one-stop-shop provider referral line, [facility] [place], at [phone].    If you feel you have received this communication in error or would no longer like to receive these types of communications, please e-mail [email or web address]

## 2020-01-14 ENCOUNTER — HOSPITAL ENCOUNTER (EMERGENCY)
Facility: HOSPITAL | Age: 75
Discharge: HOME | End: 2020-01-14
Attending: EMERGENCY MEDICINE
Payer: MEDICARE

## 2020-01-14 VITALS
DIASTOLIC BLOOD PRESSURE: 61 MMHG | RESPIRATION RATE: 20 BRPM | HEIGHT: 64 IN | HEART RATE: 81 BPM | OXYGEN SATURATION: 100 % | TEMPERATURE: 98 F | WEIGHT: 131.06 LBS | SYSTOLIC BLOOD PRESSURE: 128 MMHG | BODY MASS INDEX: 22.38 KG/M2

## 2020-01-14 DIAGNOSIS — R05.9 COUGH: ICD-10-CM

## 2020-01-14 DIAGNOSIS — M25.532 ARTHRALGIA OF LEFT WRIST: Primary | ICD-10-CM

## 2020-01-14 LAB
ANION GAP SERPL CALC-SCNC: 14 MMOL/L (ref 8–16)
BASOPHILS # BLD AUTO: 0.01 K/UL (ref 0–0.2)
BASOPHILS NFR BLD: 0.1 % (ref 0–1.9)
BUN SERPL-MCNC: 13 MG/DL (ref 8–23)
CALCIUM SERPL-MCNC: 10.2 MG/DL (ref 8.7–10.5)
CHLORIDE SERPL-SCNC: 106 MMOL/L (ref 95–110)
CO2 SERPL-SCNC: 15 MMOL/L (ref 23–29)
CREAT SERPL-MCNC: 1 MG/DL (ref 0.5–1.4)
CRP SERPL-MCNC: 13.2 MG/L (ref 0–8.2)
DIFFERENTIAL METHOD: ABNORMAL
EOSINOPHIL # BLD AUTO: 0.2 K/UL (ref 0–0.5)
EOSINOPHIL NFR BLD: 2.1 % (ref 0–8)
ERYTHROCYTE [DISTWIDTH] IN BLOOD BY AUTOMATED COUNT: 14.8 % (ref 11.5–14.5)
ERYTHROCYTE [SEDIMENTATION RATE] IN BLOOD BY WESTERGREN METHOD: 80 MM/HR (ref 0–20)
EST. GFR  (AFRICAN AMERICAN): >60 ML/MIN/1.73 M^2
EST. GFR  (NON AFRICAN AMERICAN): 55 ML/MIN/1.73 M^2
GLUCOSE SERPL-MCNC: 79 MG/DL (ref 70–110)
HCT VFR BLD AUTO: 32 % (ref 37–48.5)
HGB BLD-MCNC: 9.9 G/DL (ref 12–16)
IMM GRANULOCYTES # BLD AUTO: 0.1 K/UL (ref 0–0.04)
IMM GRANULOCYTES NFR BLD AUTO: 1.3 % (ref 0–0.5)
LYMPHOCYTES # BLD AUTO: 2.2 K/UL (ref 1–4.8)
LYMPHOCYTES NFR BLD: 29.7 % (ref 18–48)
MCH RBC QN AUTO: 28.4 PG (ref 27–31)
MCHC RBC AUTO-ENTMCNC: 30.9 G/DL (ref 32–36)
MCV RBC AUTO: 92 FL (ref 82–98)
MONOCYTES # BLD AUTO: 0.7 K/UL (ref 0.3–1)
MONOCYTES NFR BLD: 9.9 % (ref 4–15)
NEUTROPHILS # BLD AUTO: 4.3 K/UL (ref 1.8–7.7)
NEUTROPHILS NFR BLD: 56.9 % (ref 38–73)
NRBC BLD-RTO: 0 /100 WBC
PLATELET # BLD AUTO: 336 K/UL (ref 150–350)
PMV BLD AUTO: 9.4 FL (ref 9.2–12.9)
POTASSIUM SERPL-SCNC: 4.5 MMOL/L (ref 3.5–5.1)
RBC # BLD AUTO: 3.49 M/UL (ref 4–5.4)
SODIUM SERPL-SCNC: 135 MMOL/L (ref 136–145)
WBC # BLD AUTO: 7.51 K/UL (ref 3.9–12.7)

## 2020-01-14 PROCEDURE — 99284 EMERGENCY DEPT VISIT MOD MDM: CPT | Mod: 25

## 2020-01-14 PROCEDURE — 86140 C-REACTIVE PROTEIN: CPT

## 2020-01-14 PROCEDURE — 80048 BASIC METABOLIC PNL TOTAL CA: CPT

## 2020-01-14 PROCEDURE — 85025 COMPLETE CBC W/AUTO DIFF WBC: CPT

## 2020-01-14 PROCEDURE — 63600175 PHARM REV CODE 636 W HCPCS: Performed by: EMERGENCY MEDICINE

## 2020-01-14 PROCEDURE — 85651 RBC SED RATE NONAUTOMATED: CPT

## 2020-01-14 PROCEDURE — 96374 THER/PROPH/DIAG INJ IV PUSH: CPT

## 2020-01-14 PROCEDURE — 25000003 PHARM REV CODE 250: Performed by: EMERGENCY MEDICINE

## 2020-01-14 RX ORDER — HYDROCODONE BITARTRATE AND ACETAMINOPHEN 10; 325 MG/1; MG/1
1 TABLET ORAL
Status: COMPLETED | OUTPATIENT
Start: 2020-01-14 | End: 2020-01-14

## 2020-01-14 RX ORDER — PREDNISONE 20 MG/1
40 TABLET ORAL DAILY
Qty: 10 TABLET | Refills: 0 | Status: SHIPPED | OUTPATIENT
Start: 2020-01-14 | End: 2020-01-19

## 2020-01-14 RX ORDER — PREDNISONE 20 MG/1
60 TABLET ORAL
Status: COMPLETED | OUTPATIENT
Start: 2020-01-14 | End: 2020-01-14

## 2020-01-14 RX ORDER — MELOXICAM 7.5 MG/1
7.5 TABLET ORAL DAILY
Qty: 14 TABLET | Refills: 0 | Status: SHIPPED | OUTPATIENT
Start: 2020-01-14 | End: 2021-05-10

## 2020-01-14 RX ORDER — KETOROLAC TROMETHAMINE 30 MG/ML
15 INJECTION, SOLUTION INTRAMUSCULAR; INTRAVENOUS
Status: COMPLETED | OUTPATIENT
Start: 2020-01-14 | End: 2020-01-14

## 2020-01-14 RX ADMIN — PREDNISONE 60 MG: 20 TABLET ORAL at 12:01

## 2020-01-14 RX ADMIN — KETOROLAC TROMETHAMINE 15 MG: 30 INJECTION, SOLUTION INTRAMUSCULAR at 12:01

## 2020-01-14 RX ADMIN — HYDROCODONE BITARTRATE AND ACETAMINOPHEN 1 TABLET: 10; 325 TABLET ORAL at 12:01

## 2020-01-14 NOTE — ED PROVIDER NOTES
SCRIBE #1 NOTE: I, Neri Lopez, am scribing for, and in the presence of, Lazara Dalal MD. I have scribed the entire note.       History     Chief Complaint   Patient presents with    Hand Pain     Swelling, red, skin hot to touch on left hand; recently in hospital for cellulitis and IV abx     Review of patient's allergies indicates:   Allergen Reactions    Doxycycline Nausea Only    Penicillins Anaphylaxis, Hives, Shortness Of Breath and Swelling    Oxycodone Other (See Comments)     sleepy  sleepy  sleepy  sleepy  sleepy      Adhesive Rash    Betadine [povidone-iodine] Rash     Pt confirms has completed CT w/ IV contrast-iodine without reaction or need for pre-medication.    Sulfa (sulfonamide antibiotics) Itching         History of Present Illness     HPI    1/14/2020, 11:41 AM  History obtained from the patient      History of Present Illness: Shireen Felix is a 74 y.o. female patient with a h/o arthritis who presents to the Emergency Department for evaluation of left wrist swelling which onset yesterday. Symptoms are constant and moderate in severity. No mitigating or exacerbating factors reported. Associated sxs include left wrist pain. Patient denies any fever, chills, n/v, CP, SOB, and all other sxs at this time. Patient reports ice/elevation at home. No prior medications reported. No further complaints or concerns at this time. Patient denies any trauma/injury prior to worsening of sx.      Arrival mode: Personal transportation     PCP: POOL Chaudhari Jr, MD      Past Medical History:  Past Medical History:   Diagnosis Date    Abnormal ECG 8/5/2019    Aneurysm     Anticoagulant long-term use     Arthritis     CAD in native artery 8/5/2019    COPD (chronic obstructive pulmonary disease)     Diabetes mellitus     Glaucoma     Hypertension     Seizures     Shingles 05/27/2017    Stroke        Past Surgical History:  Past Surgical History:   Procedure Laterality Date     BRAIN SURGERY      CARDIAC CATHETERIZATION      CORONARY ANGIOPLASTY      EPIDURAL STEROID INJECTION INTO LUMBAR SPINE Bilateral 11/12/2019    Procedure: TF XANDER L4/5;  Surgeon: Desean Dias MD;  [facility];  Service: Pain Management;  Laterality: Bilateral;    HYSTERECTOMY      TRANSFORAMINAL EPIDURAL INJECTION OF STEROID Bilateral 7/23/2019    Procedure: Bilateral L3/4 Transforaminal Epidural Steroid Injection;  Surgeon: Desean Dias MD;  Location: [facility];  Service: Pain Management;  Laterality: Bilateral;         Family History:  Family History   Problem Relation Age of Onset    No Known Problems Mother     No Known Problems Father        Social History:   Social History     Tobacco Use    Smoking status: Former Smoker     Packs/day: 1.00     Years: 10.00     Pack years: 10.00     Types: Cigarettes     Last attempt to quit: 4/11/2004     Years since quitting: 15.7    Smokeless tobacco: Never Used   Substance and Sexual Activity    Alcohol use: No    Drug use: Never    Sexual activity: Not Currently        Review of Systems     Review of Systems   Constitutional: Negative for chills and fever.   HENT: Negative for sore throat.    Respiratory: Negative for shortness of breath.    Cardiovascular: Negative for chest pain.   Gastrointestinal: Negative for nausea and vomiting.   Genitourinary: Negative for dysuria.   Musculoskeletal: Positive for arthralgias and joint swelling. Negative for back pain.   Skin: Negative for rash.   Neurological: Negative for weakness.   Hematological: Does not bruise/bleed easily.   All other systems reviewed and are negative.       Physical Exam     Initial Vitals [01/14/20 1132]   BP Pulse Resp Temp SpO2   132/62 78 18 97.8 °F (36.6 °C) 100 %      MAP       --          Physical Exam  Nursing Notes and Vital Signs Reviewed.  Constitutional: Well-developed and well-nourished. NAD.  Head: Atraumatic. Normocephalic.  Eyes: PERRL. EOM intact.  "Conjunctivae are not pale. No scleral icterus.  ENT: Mucous membranes are moist. Oropharynx is clear and symmetric.    Neck: Supple. Full ROM. No lymphadenopathy.  Cardiovascular: Regular rate. Regular rhythm. No murmurs, rubs, or gallops. Distal pulses are 2+ and symmetric.  Pulmonary/Chest: No respiratory distress. Clear to auscultation bilaterally. No wheezing or rales.  Abdominal: Soft and non-distended.  There is no tenderness.  No rebound, guarding, or rigidity. Good bowel sounds.  Genitourinary: No CVA tenderness  Musculoskeletal: Moves all extremities. No obvious deformities. No calf tenderness.  Skin: Warm and dry.  Neurological:  Alert, awake, and appropriate.  Normal speech.  No acute focal neurological deficits are appreciated.  Psychiatric: Normal affect. Good eye contact. Appropriate in content.  Left Wrist: Mild soft tissue swelling to dorsum aspect of left wrist around 1st and 2nd metacarpal. Mild warmth noted. No erythema to arm or hand. NVI. No obvious deformity. Full flexion and extension of the wrist. Radial, median, and ulnar nerves are intact. Radial and ulnar pulses are 2+. Normal capillary refill.  Distal sensation is intact.      ED Course   Procedures  ED Vital Signs:  Vitals:    01/14/20 1132   BP: 132/62   Pulse: 78   Resp: 18   Temp: 97.8 °F (36.6 °C)   TempSrc: Oral   SpO2: 100%   Weight: 59.5 kg (131 lb 1 oz)   Height: 5' 4" (1.626 m)       Abnormal Lab Results:  Labs Reviewed   CBC W/ AUTO DIFFERENTIAL - Abnormal; Notable for the following components:       Result Value    RBC 3.49 (*)     Hemoglobin 9.9 (*)     Hematocrit 32.0 (*)     Mean Corpuscular Hemoglobin Conc 30.9 (*)     RDW 14.8 (*)     Immature Granulocytes 1.3 (*)     Immature Grans (Abs) 0.10 (*)     All other components within normal limits   BASIC METABOLIC PANEL - Abnormal; Notable for the following components:    Sodium 135 (*)     CO2 15 (*)     eGFR if non  55 (*)     All other components within " normal limits   C-REACTIVE PROTEIN - Abnormal; Notable for the following components:    CRP 13.2 (*)     All other components within normal limits   SEDIMENTATION RATE        All Lab Results:  Results for orders placed or performed during the hospital encounter of 01/14/20   CBC auto differential   Result Value Ref Range    WBC 7.51 3.90 - 12.70 K/uL    RBC 3.49 (L) 4.00 - 5.40 M/uL    Hemoglobin 9.9 (L) 12.0 - 16.0 g/dL    Hematocrit 32.0 (L) 37.0 - 48.5 %    Mean Corpuscular Volume 92 82 - 98 fL    Mean Corpuscular Hemoglobin 28.4 27.0 - 31.0 pg    Mean Corpuscular Hemoglobin Conc 30.9 (L) 32.0 - 36.0 g/dL    RDW 14.8 (H) 11.5 - 14.5 %    Platelets 336 150 - 350 K/uL    MPV 9.4 9.2 - 12.9 fL    Immature Granulocytes 1.3 (H) 0.0 - 0.5 %    Gran # (ANC) 4.3 1.8 - 7.7 K/uL    Immature Grans (Abs) 0.10 (H) 0.00 - 0.04 K/uL    Lymph # 2.2 1.0 - 4.8 K/uL    Mono # 0.7 0.3 - 1.0 K/uL    Eos # 0.2 0.0 - 0.5 K/uL    Baso # 0.01 0.00 - 0.20 K/uL    nRBC 0 0 /100 WBC    Gran% 56.9 38.0 - 73.0 %    Lymph% 29.7 18.0 - 48.0 %    Mono% 9.9 4.0 - 15.0 %    Eosinophil% 2.1 0.0 - 8.0 %    Basophil% 0.1 0.0 - 1.9 %    Differential Method Automated    Basic metabolic panel   Result Value Ref Range    Sodium 135 (L) 136 - 145 mmol/L    Potassium 4.5 3.5 - 5.1 mmol/L    Chloride 106 95 - 110 mmol/L    CO2 15 (L) 23 - 29 mmol/L    Glucose 79 70 - 110 mg/dL    BUN, Bld 13 8 - 23 mg/dL    Creatinine 1.0 0.5 - 1.4 mg/dL    Calcium 10.2 8.7 - 10.5 mg/dL    Anion Gap 14 8 - 16 mmol/L    eGFR if African American >60 >60 mL/min/1.73 m^2    eGFR if non African American 55 (A) >60 mL/min/1.73 m^2   C-reactive protein   Result Value Ref Range    CRP 13.2 (H) 0.0 - 8.2 mg/L         Imaging Results          X-Ray Chest PA And Lateral (Final result)  Result time 01/14/20 13:04:33    Final result by Brody Willis MD (01/14/20 13:04:33)                 Impression:      No acute process seen.      Electronically signed by: Brody Willis  MD  Date:    01/14/2020  Time:    13:04             Narrative:    EXAMINATION:  XR CHEST PA AND LATERAL    CLINICAL HISTORY:  Cough    COMPARISON:  07/28/2019    FINDINGS:  Cardiac silhouette is normal.  The lungs demonstrate no evidence of active disease.  No evidence of pleural effusion or pneumothorax.  Bones demonstrate mild spondylosis.                               X-Ray Wrist Complete Left (Final result)  Result time 01/14/20 13:06:08    Final result by Brody Willis MD (01/14/20 13:06:08)                 Impression:      No acute fracture or dislocation.  Advanced degenerative changes are seen at the 1st carpometacarpal joint.      Electronically signed by: Brody Willis MD  Date:    01/14/2020  Time:    13:06             Narrative:    EXAMINATION:  XR WRIST COMPLETE 3 VIEWS LEFT    CLINICAL HISTORY:  XR WRIST COMPLETE 3 VIEWS LEFTPain in left wrist    COMPARISON:  07/31/2019    FINDINGS:  Three views of the left wrist were obtained.    No evidence of acute fracture or dislocation.  Diffuse osteopenia.  Advanced degenerative changes are seen at the 1st carpometacarpal joint.  Mild soft tissue swelling.    Subcutaneous vascular phleboliths are noted.                               The Emergency Provider reviewed the vital signs and test results, which are outlined above.     ED Discussion       1:44 PM: Reassessed pt at this time.  Pt states her condition has improved at this time. Discussed with pt all pertinent ED information and results. Discussed pt dx and plan of tx. Gave pt all f/u and return to the ED instructions. All questions and concerns were addressed at this time. Pt expresses understanding of information and instructions, and is comfortable with plan to discharge. Pt is stable for discharge.    I discussed with patient and/or family/caretaker that evaluation in the ED does not suggest any emergent or life threatening medical conditions requiring immediate intervention beyond what was provided  in the ED, and I believe patient is safe for discharge.  Regardless, an unremarkable evaluation in the ED does not preclude the development or presence of a serious of life threatening condition. As such, patient was instructed to return immediately for any worsening or change in current symptoms.    I discussed with patient and/or family/caretaker that negative X-ray does not rule out occult fracture or other soft tissue injury.  Persistent pain greater than 7-10 days or increased pain requires follow up, specifically with orthopedics.        MDM        Medical Decision Making:   Clinical Tests:   Lab Tests: Reviewed and Ordered  Radiological Study: Reviewed and Ordered           ED Medication(s):  Medications   HYDROcodone-acetaminophen  mg per tablet 1 tablet (1 tablet Oral Given 1/14/20 1217)   ketorolac injection 15 mg (15 mg Intravenous Given 1/14/20 1235)   predniSONE tablet 60 mg (60 mg Oral Given 1/14/20 1218)       New Prescriptions    MELOXICAM (MOBIC) 7.5 MG TABLET    Take 1 tablet (7.5 mg total) by mouth once daily.    PREDNISONE (DELTASONE) 20 MG TABLET    Take 2 tablets (40 mg total) by mouth once daily. for 5 days        Medication List      START taking these medications    meloxicam 7.5 MG tablet  Commonly known as:  MOBIC  Take 1 tablet (7.5 mg total) by mouth once daily.     predniSONE 20 MG tablet  Commonly known as:  DELTASONE  Take 2 tablets (40 mg total) by mouth once daily. for 5 days        ASK your doctor about these medications    albuterol 90 mcg/actuation inhaler  Commonly known as:  PROVENTIL/VENTOLIN HFA  Inhale 2 puffs into the lungs every 4 (four) hours as needed.     albuterol-ipratropium 2.5 mg-0.5 mg/3 mL nebulizer solution  Commonly known as:  DUO-NEB  Take 3 mLs by nebulization every 6 (six) hours as needed for Wheezing. Rescue     amLODIPine 10 MG tablet  Commonly known as:  NORVASC  Take 1 tablet (10 mg total) by mouth once daily.     Aptiom 600 mg Tab  Generic drug:   eslicarbazepine     Breo Ellipta 200-25 mcg/dose Dsdv diskus inhaler  Generic drug:  fluticasone furoate-vilanterol     cholecalciferol (vitamin D3) 25 mcg (1,000 unit) capsule  Commonly known as:  Vitamin D3  Take 1 capsule (1,000 Units total) by mouth once daily. Twice weekly     clindamycin 300 MG capsule  Commonly known as:  CLEOCIN     clopidogrel 75 mg tablet  Commonly known as:  PLAVIX  Take 1 tablet (75 mg total) by mouth once daily.     dexlansoprazole 60 mg capsule  Commonly known as:  DEXILANT  Take 1 capsule (60 mg total) by mouth once daily.     diltiaZEM 120 mg 24 hr tablet  Commonly known as:  CARDIZEM [place]  Take 1 tablet (120 mg total) by mouth once daily.     DULoxetine 30 MG capsule  Commonly known as:  CYMBALTA  Take 1 capsule (30 mg total) by mouth once daily.     escitalopram oxalate 20 MG tablet  Commonly known as:  LEXAPRO  Take 1 tablet (20 mg total) by mouth once daily.     gemfibrozil 600 MG tablet  Commonly known as:  LOPID  Take 1 tablet (600 mg total) by mouth once daily.     GI COCKTAIL SIMPLE RECORD  Take 50 mLs by mouth 2 (two) times daily.     loratadine 10 mg tablet  Commonly known as:  CLARITIN     MELATONIN ORAL     metFORMIN 500 MG tablet  Commonly known as:  GLUCOPHAGE  Take 1 tablet (500 mg total) by mouth 2 (two) times daily.     metoprolol tartrate 25 MG tablet  Commonly known as:  LOPRESSOR     PROBIOTIC ACIDOPHILUS ORAL     Prolia 60 mg/mL Syrg  Generic drug:  denosumab     ranitidine 150 MG tablet  Commonly known as:  ZANTAC     ranolazine 1,000 mg Tb12  Commonly known as:  RANEXA  Take 1 tablet (1,000 mg total) by mouth 2 (two) times daily.     senna 8.6 mg Cap  Generic drug:  sennosides     tiotropium 18 mcg inhalation capsule  Commonly known as:  SPIRIVA  Inhale 1 capsule (18 mcg total) into the lungs once daily.     tolterodine 4 MG 24 hr capsule  Commonly known as:  DETROL [place]     valsartan 320 MG tablet  Commonly known as:  DIOVAN  Take 1 tablet (320 mg total) by mouth  once daily.     venlafaxine 150 mg Tr24           Where to Get Your Medications      These medications were sent to [facility] [place] - [address] [facility].  [address] [facility] [facility].[place] [address]    Phone:  [phone]   · predniSONE 20 MG tablet     You can get these medications from any pharmacy    Bring a paper prescription for each of these medications  · meloxicam 7.5 MG tablet         Follow-up Information     E Brody Chaudhari Jr, MD. Schedule an appointment as soon as possible for a visit in 2 days.    Specialties:  Internal Medicine, Pediatrics  Why:  Return to the Emergency Room, If symptoms worsen  Contact information:  [address]  [place] [address]  [phone]                       Scribe Attestation:   Scribe #1: I performed the above scribed service and the documentation accurately describes the services I performed. I attest to the accuracy of the note.     Attending:   Physician Attestation Statement for Scribe #1: I, Lazara Dalal MD, personally performed the services described in this documentation, as scribed by Neri Lopez, in my presence, and it is both accurate and complete.           Clinical Impression       ICD-10-CM ICD-9-CM   1. Arthralgia of left wrist M25.532 719.43   2. Cough R05 786.2       Disposition:   Disposition: Discharged  Condition: Stable         Lazara Dalal MD  01/14/20 2826

## 2020-01-23 ENCOUNTER — HOSPITAL ENCOUNTER (OUTPATIENT)
Dept: RADIOLOGY | Facility: HOSPITAL | Age: 75
Discharge: HOME | End: 2020-01-23
Attending: PAIN MEDICINE
Payer: MEDICARE

## 2020-01-23 ENCOUNTER — OFFICE VISIT (OUTPATIENT)
Dept: CARDIOLOGY | Facility: CLINIC | Age: 75
End: 2020-01-23
Payer: MEDICARE

## 2020-01-23 ENCOUNTER — OFFICE VISIT (OUTPATIENT)
Dept: PAIN MEDICINE | Facility: CLINIC | Age: 75
End: 2020-01-23
Payer: MEDICARE

## 2020-01-23 ENCOUNTER — TELEPHONE (OUTPATIENT)
Dept: PAIN MEDICINE | Facility: CLINIC | Age: 75
End: 2020-01-23

## 2020-01-23 ENCOUNTER — OFFICE VISIT (OUTPATIENT)
Dept: PULMONOLOGY | Facility: CLINIC | Age: 75
End: 2020-01-23
Payer: MEDICARE

## 2020-01-23 VITALS
SYSTOLIC BLOOD PRESSURE: 112 MMHG | WEIGHT: 129 LBS | RESPIRATION RATE: 18 BRPM | HEART RATE: 78 BPM | BODY MASS INDEX: 22.02 KG/M2 | HEIGHT: 64 IN | DIASTOLIC BLOOD PRESSURE: 63 MMHG | BODY MASS INDEX: 22.18 KG/M2 | SYSTOLIC BLOOD PRESSURE: 118 MMHG | HEART RATE: 75 BPM | DIASTOLIC BLOOD PRESSURE: 70 MMHG | WEIGHT: 129.19 LBS

## 2020-01-23 VITALS
DIASTOLIC BLOOD PRESSURE: 86 MMHG | HEIGHT: 64 IN | SYSTOLIC BLOOD PRESSURE: 146 MMHG | OXYGEN SATURATION: 98 % | BODY MASS INDEX: 22.02 KG/M2 | HEART RATE: 72 BPM | RESPIRATION RATE: 18 BRPM | WEIGHT: 129 LBS

## 2020-01-23 DIAGNOSIS — J30.9 CHRONIC ALLERGIC RHINITIS: Chronic | ICD-10-CM

## 2020-01-23 DIAGNOSIS — N18.30 CONTROLLED TYPE 2 DIABETES MELLITUS WITH STAGE 3 CHRONIC KIDNEY DISEASE, WITHOUT LONG-TERM CURRENT USE OF INSULIN: ICD-10-CM

## 2020-01-23 DIAGNOSIS — R56.9 FOCAL SEIZURES: ICD-10-CM

## 2020-01-23 DIAGNOSIS — R53.81 DEBILITY: ICD-10-CM

## 2020-01-23 DIAGNOSIS — I15.2 HYPERTENSION ASSOCIATED WITH DIABETES: ICD-10-CM

## 2020-01-23 DIAGNOSIS — J44.9 CHRONIC OBSTRUCTIVE PULMONARY DISEASE, UNSPECIFIED COPD TYPE: ICD-10-CM

## 2020-01-23 DIAGNOSIS — J84.112 UIP (USUAL INTERSTITIAL PNEUMONITIS): Chronic | ICD-10-CM

## 2020-01-23 DIAGNOSIS — E11.22 CONTROLLED TYPE 2 DIABETES MELLITUS WITH STAGE 3 CHRONIC KIDNEY DISEASE, WITHOUT LONG-TERM CURRENT USE OF INSULIN: ICD-10-CM

## 2020-01-23 DIAGNOSIS — I25.10 CAD IN NATIVE ARTERY: Primary | ICD-10-CM

## 2020-01-23 DIAGNOSIS — J41.1 MUCOPURULENT CHRONIC BRONCHITIS: Primary | Chronic | ICD-10-CM

## 2020-01-23 DIAGNOSIS — E11.59 HYPERTENSION ASSOCIATED WITH DIABETES: ICD-10-CM

## 2020-01-23 DIAGNOSIS — R00.2 PALPITATIONS: ICD-10-CM

## 2020-01-23 DIAGNOSIS — I25.118 CORONARY ARTERY DISEASE OF NATIVE ARTERY OF NATIVE HEART WITH STABLE ANGINA PECTORIS: ICD-10-CM

## 2020-01-23 DIAGNOSIS — Z86.73 HX OF COMPLETED STROKE: ICD-10-CM

## 2020-01-23 DIAGNOSIS — M54.50 LUMBAR PAIN: Primary | ICD-10-CM

## 2020-01-23 DIAGNOSIS — I10 ESSENTIAL HYPERTENSION: ICD-10-CM

## 2020-01-23 DIAGNOSIS — M54.50 LUMBAR PAIN: ICD-10-CM

## 2020-01-23 PROCEDURE — 99213 OFFICE O/P EST LOW 20 MIN: CPT | Mod: PBBFAC,27,25 | Performed by: INTERNAL MEDICINE

## 2020-01-23 PROCEDURE — 1159F MED LIST DOCD IN RCRD: CPT | Mod: ,,, | Performed by: PAIN MEDICINE

## 2020-01-23 PROCEDURE — 1159F MED LIST DOCD IN RCRD: CPT | Mod: ,,, | Performed by: INTERNAL MEDICINE

## 2020-01-23 PROCEDURE — 99214 PR OFFICE/OUTPT VISIT, EST, LEVL IV, 30-39 MIN: ICD-10-PCS | Mod: S$PBB,,, | Performed by: INTERNAL MEDICINE

## 2020-01-23 PROCEDURE — 99999 PR PBB SHADOW E&M-EST. PATIENT-LVL III: CPT | Mod: PBBFAC,,, | Performed by: INTERNAL MEDICINE

## 2020-01-23 PROCEDURE — 72100 X-RAY EXAM L-S SPINE 2/3 VWS: CPT | Mod: 26,,, | Performed by: RADIOLOGY

## 2020-01-23 PROCEDURE — 99213 OFFICE O/P EST LOW 20 MIN: CPT | Mod: PBBFAC,27,25 | Performed by: PAIN MEDICINE

## 2020-01-23 PROCEDURE — 72100 X-RAY EXAM L-S SPINE 2/3 VWS: CPT | Mod: TC

## 2020-01-23 PROCEDURE — 1159F PR MEDICATION LIST DOCUMENTED IN MEDICAL RECORD: ICD-10-PCS | Mod: ,,, | Performed by: INTERNAL MEDICINE

## 2020-01-23 PROCEDURE — 99215 PR OFFICE/OUTPT VISIT, EST, LEVL V, 40-54 MIN: ICD-10-PCS | Mod: S$PBB,,, | Performed by: INTERNAL MEDICINE

## 2020-01-23 PROCEDURE — 96372 THER/PROPH/DIAG INJ SC/IM: CPT | Mod: PBBFAC

## 2020-01-23 PROCEDURE — 99999 PR PBB SHADOW E&M-EST. PATIENT-LVL III: ICD-10-PCS | Mod: PBBFAC,,, | Performed by: INTERNAL MEDICINE

## 2020-01-23 PROCEDURE — 99214 OFFICE O/P EST MOD 30 MIN: CPT | Mod: S$PBB,,, | Performed by: INTERNAL MEDICINE

## 2020-01-23 PROCEDURE — 99214 PR OFFICE/OUTPT VISIT, EST, LEVL IV, 30-39 MIN: ICD-10-PCS | Mod: S$PBB,,, | Performed by: PAIN MEDICINE

## 2020-01-23 PROCEDURE — 1159F PR MEDICATION LIST DOCUMENTED IN MEDICAL RECORD: ICD-10-PCS | Mod: ,,, | Performed by: PAIN MEDICINE

## 2020-01-23 PROCEDURE — 72100 XR LUMBAR SPINE AP AND LATERAL: ICD-10-PCS | Mod: 26,,, | Performed by: RADIOLOGY

## 2020-01-23 PROCEDURE — 1125F PR PAIN SEVERITY QUANTIFIED, PAIN PRESENT: ICD-10-PCS | Mod: ,,, | Performed by: PAIN MEDICINE

## 2020-01-23 PROCEDURE — 99214 OFFICE O/P EST MOD 30 MIN: CPT | Mod: S$PBB,,, | Performed by: PAIN MEDICINE

## 2020-01-23 PROCEDURE — 99999 PR PBB SHADOW E&M-EST. PATIENT-LVL III: ICD-10-PCS | Mod: PBBFAC,,, | Performed by: PAIN MEDICINE

## 2020-01-23 PROCEDURE — 99215 OFFICE O/P EST HI 40 MIN: CPT | Mod: S$PBB,,, | Performed by: INTERNAL MEDICINE

## 2020-01-23 PROCEDURE — 99999 PR PBB SHADOW E&M-EST. PATIENT-LVL III: CPT | Mod: PBBFAC,,, | Performed by: PAIN MEDICINE

## 2020-01-23 PROCEDURE — 1125F AMNT PAIN NOTED PAIN PRSNT: CPT | Mod: ,,, | Performed by: PAIN MEDICINE

## 2020-01-23 PROCEDURE — 99213 OFFICE O/P EST LOW 20 MIN: CPT | Mod: PBBFAC,25 | Performed by: INTERNAL MEDICINE

## 2020-01-23 RX ORDER — FLUTICASONE PROPIONATE 50 MCG
1 SPRAY, SUSPENSION (ML) NASAL DAILY
Qty: 16 G | Refills: 11 | Status: SHIPPED | OUTPATIENT
Start: 2020-01-23 | End: 2020-11-10

## 2020-01-23 RX ORDER — FLUTICASONE FUROATE AND VILANTEROL 200; 25 UG/1; UG/1
1 POWDER RESPIRATORY (INHALATION) NIGHTLY
Qty: 60 EACH | Refills: 11 | Status: SHIPPED | OUTPATIENT
Start: 2020-01-23 | End: 2021-02-01

## 2020-01-23 RX ORDER — METHYLPREDNISOLONE ACETATE 40 MG/ML
40 INJECTION, SUSPENSION INTRA-ARTICULAR; INTRALESIONAL; INTRAMUSCULAR; SOFT TISSUE
Status: COMPLETED | OUTPATIENT
Start: 2020-01-23 | End: 2020-01-23

## 2020-01-23 RX ORDER — ALBUTEROL SULFATE 90 UG/1
2 AEROSOL, METERED RESPIRATORY (INHALATION) EVERY 4 HOURS PRN
Qty: 18 G | Refills: 11 | Status: SHIPPED | OUTPATIENT
Start: 2020-01-23 | End: 2021-02-01

## 2020-01-23 RX ORDER — TRAMADOL HYDROCHLORIDE 50 MG/1
50 TABLET ORAL EVERY 12 HOURS PRN
Qty: 60 TABLET | Refills: 1 | Status: SHIPPED | OUTPATIENT
Start: 2020-01-23 | End: 2020-02-22

## 2020-01-23 RX ADMIN — METHYLPREDNISOLONE ACETATE 40 MG: 40 INJECTION, SUSPENSION INTRALESIONAL; INTRAMUSCULAR; INTRASYNOVIAL; SOFT TISSUE at 02:01

## 2020-01-23 NOTE — H&P (VIEW-ONLY)
Chief Pain Complaint:  Low back pain  Right shoulder pain    Interval HPI: Ms. Felix returns to clinic for follow-up.  She reports that she underwent bilateral L4/5 transforaminal epidural steroid injections in November 2019 reports ongoing 80% symptomatic pain relief.  She has also been participating physical therapy which she has found to be very helpful however 4 days ago she started to do some leg raise is in her bed and she felt severe pain in her low back which has not subsided.  She denies having any radiation except for around her flank into her anterior abdomen.  She feels like the pain sometimes comes straight through from her back to her abdomen.  Today she rates her pain as a 10/10 describes it as a constant aching and sharp pain. She has been using a heating pad which is minimally helpful and she has been holding off on physical therapy at this time. She denies having bowel bladder difficulty.    Interval History: Patient was seen on 8/4/19. At that time she underwent bilateral L3/4 TF XANDER.  The patient reports that she is/was better following the procedure.  she reports 75% pain relief.  She had a fall, then pain increased. She is currently living at [facility]. She is doing at home therapy there, which is helping. She is in wheelchair now but hopes to transition to walker soon.  After the fall, she was not able to get out of the bed.     Initial History of Present Illness:   This patient is a 74 y.o. female who presents today complaining of the above noted pain/s. The patient describes the pain as follows.  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  She reports approximately 2 years ago she had to sudden deaths in the family which caused very stressful time for her and resulted in shingles rash in the left V1 distribution however she reports having excruciating pain  in the left V1 and V2 distributions.  She denies having difficulty with eating food and brushing her teeth on the left side of her face however the left lateral side of her nose gets her excruciating pain.  She has been tried on numerous medications in the past including gabapentin, Lyrica, Valtrex, Celebrex, ibuprofen which have all provided minimal benefit however steroids have been most helpful.  Today she rates her pain as an 8/10 and describes a constant burning sensation the left side of her face in addition to radiation into her bilateral lower extremities, her right shoulder, her left upper extremity occasionally as a pins and needle sensation.  She does report having numbness and weakness in her left lower extremity.  She has been physical therapy which she completed in February of 2019 however this caused most of her low back and leg symptoms to worsen in addition to her post herpetic neuralgia to worsen.  She denies having bowel bladder difficulties.  Her symptoms are worse with activity such as walking in her somewhat improved with rest however she had finds it difficult to get into a comfortable position. She has been using topical lidocaine patches and applying to the left side of her face which does provide significant benefit.  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    Previous Therapy:  Medications:  Celebrex, ibuprofen, gabapentin, Lyrica, Valtrex, steroids  Injections: bilateral L3/4 TF XANDER on 8/4/19 with 75% pain relief, bilateral L4/5 transforaminal epidural steroid injections with 80% symptomatic pain relief  Surgeries: None  Physical Therapy: Completed in the Past, currently participating    Past Surgical History:   Procedure Laterality Date    BRAIN SURGERY      CARDIAC CATHETERIZATION      CORONARY ANGIOPLASTY      EPIDURAL STEROID INJECTION INTO  LUMBAR SPINE Bilateral 11/12/2019    Procedure: TF XANDER L4/5;  Surgeon: Desean Dias MD;  Location: [facility];  Service: Pain Management;  Laterality: Bilateral;    HYSTERECTOMY      TRANSFORAMINAL EPIDURAL INJECTION OF STEROID Bilateral 7/23/2019    Procedure: Bilateral L3/4 Transforaminal Epidural Steroid Injection;  Surgeon: Desean Dias MD;  Location: [facility];  Service: Pain Management;  Laterality: Bilateral;       Imaging / Labs / Studies (reviewed on 1/23/2020):    Results for orders placed during the hospital encounter of 08/04/19   CT Lumbar Spine Without Contrast    Narrative FINDINGS:  Wedge compression deformity of T12 consistent with an old fracture.  No bowing of the posterior cortex.Gas formation within the disc at the L2-3 L3-4 L4-5 and L5-S1 levels.Bilateral facet arthropathy and neural foraminal narrowing seen most significantly at the L3-4 L4-5 and L5-S1 levels with possible bilateral L3-L4 and L5 nerve root impingement in the neural foramina.  13 degree lumbar scoliotic curvature convex to the left side.    Impression Mild lumbar scoliotic curvature convex to the left side with multilevel disc space narrowing seen most significantly at the L3-4 L4-5 and L5-S1 levels.  Bony neural foraminal narrowing with possible nerve root impingement seen most significantly at the L3-L4 level on the right side with possible right-sided L3 nerve root impingement and on the left side at the L4-5 and L5-S1 levels with possible left-sided L4 and L5 nerve root impingement.  Old compression fracture of the T12 vertebral body.  Please see the prior myelogram dated 07/12/2019 showing almost complete block at the L3-L4 level.  All CT scans at this facility are performed  using dose modulation techniques as appropriate to performed exam including the following:  automated exposure control; adjustment of mA and/or kV according to the patients size (this includes techniques or standardized protocols for  targeted exams where dose is matched to indication/reason for exam: i.e. extremities or head);  iterative reconstruction technique.     Results for orders placed during the hospital encounter of 08/04/19   CT Thoracic Spine Without Contrast    Narrative COMPARISON:  None  FINDINGS:  Normal vertebral alignment.  Multilevel degenerative changes of the vertebral endplates seen most significantly at the T7-T8-T8-T9 T9-T10 T10-T11 and the T11-T12 vertebra.  No evidence of an acute compression fracture can be seen.  Cannot exclude an old mild wedge compression deformity of the T12 vertebral body.  Multilevel facet arthropathy.  Multilevel disc space narrowings between T4 and T10. Atherosclerotic calcifications of the abdominal aorta without aneurysmal dilatation.  Multiple subpleural blebs present in the adjacent lungs.    Impression Multilevel degenerative changes as described above.  Mild wedge deformity of the T12 vertebral body consistent with an old compression fracture.  No acute fracture identified.  The patient has had a prior myelogram dated 07/12/2019 showing multiple impingement on the ventral thecal sac secondary to disc disease.  T12 vertebra is similar on the prior study.  All CT scans at this facility are performed  using dose modulation techniques as appropriate to performed exam including the following:  automated exposure control; adjustment of mA and/or kV according to the patients size (this includes techniques or standardized protocols for targeted exams where dose is matched to indication/reason for exam: i.e. extremities or head);  iterative reconstruction technique.       Results for orders placed during the hospital encounter of 07/12/19   X-Ray Hips Bilateral 2 View Incl AP Pelvis    Narrative FINDINGS:  Degenerative changes of the lumbosacral spine and sacroiliac joints.  Bones are demineralized.  Bilateral total hip arthroplasty changes are noted.  No periprosthetic fracture or hardware  complication.     Results for orders placed during the hospital encounter of 08/04/19   X-Ray Hip 2 View Left    Narrative COMPARISON:  07/31/2019  FINDINGS:  Bilateral hip replacements are intact in good alignment.  Left hip appears free of any acute fracture or dislocation.     Results for orders placed during the hospital encounter of 07/31/19   CT Cervical Spine Without Contrast    Narrative FINDINGS:  Osteopenia.  Grade 2 degenerative spondylolisthesis at C3-C4.  Grade 1 degenerative spondylolisthesis at C4-C5.  Vertebral body height is normal.  No acute fractures. No prevertebral soft tissue swelling. Atlanto-occipital articulation is normal.  Congenitally patulous spinal canal.  No significant spinal stenosis.  Moderate left foraminal stenosis at C3-C4.    Impression No acute findings.  Degenerative spondylolisthesis at C3-C4 and C4-C5.  All CT scans at this facility use dose modulation, iterative reconstruction, and/or weight base dosing when appropriate to reduce radiation dose to as low as reasonably achievable.     Results for orders placed during the hospital encounter of 07/12/19   Fl Myelogram 2 Or More Regions    Narrative TECHNIQUE:  After obtaining informed consent from the patient explain the risks, benefits and alternatives to the procedure the patient was placed on the fluoroscopic table in the prone position. The risks included although not limited to bleeding, infection, contrast/drug reaction, nerve root/cord injury,  CSF leak/spinal headache, seizures and herniation. The L2/3 level within isolated under fluoroscopy. The overlying skin was prepped and draped routine sterile fashion. Approximately 1 cc of a 1% lidocaine was used for local anesthesia. Using a 22-gauge spinal needle access was obtained to the thecal sac be a midline translaminar approach. Following removal of stylet spontaneous visualization of clear cerebrospinal fluid was identified. Following this contrast system was attached  and approximately 10 cc of a Omnipaque 300 contrast material was infused under fluoroscopy. Patient tolerated procedure well. No immediate postprocedure complication. The stylet was then reinserted and the  spinal needle system was removed in full. Adhesive bandage was placed over the puncture site. Spot overhead fluoroscopic images in the and lateral projection with additional lateral flexion and extension views.  Total fluoro time was 2.1 minutes and 1 fluoroscopic image was obtained.  COMPARISON:  06/04/2019  FINDINGS:  No complications.  Limited images due to limited mobility.    Impression Successful fluoroscopic myelogram. See dedicated CT myelogram thoracic and lumbar spine.     Results for orders placed during the hospital encounter of 07/12/19   CT Myelography Thoracic Lumbar Spine    Narrative COMPARISON:  None  FINDINGS:  Thoracic spine:  There is no acute fracture of the thoracic spine.  There is an old anterior wedge compression fracture of the T12 vertebral body.  There is a Schmorl's node seen within the superior aspect of the T8 vertebral body.  There is a slight levoscoliosis of the thoracic spine centered at T3.  T1-T2: Unremarkable.  T2-T3: Unremarkable.  T3-T4: There is a disc bulge with left ventral surface thecal sac effacement but no impingement upon the cord.  T4-T5: Unremarkable.  T5-T6: Unremarkable.  T6-T7: There is a central focal disc protrusion which causes right ventral surface cord impingement.  The disc protrusion measures 7 mm in AP dimension, 14 mm in craniocaudal dimension, and 10 mm in transverse dimension.  T7-T8: Unremarkable.  T8-T9: There is a central disc extrusion with the disc extrusion extending inferiorly from the level of the disc and measuring 19 mm in craniocaudal dimension, 8 mm in transverse dimension, and 5 mm in anterior-posterior dimension.  This disc extrusion slightly impinges upon the ventral surface of the cord.  T9-T10: Unremarkable.  T10-T11: There is a  disc extrusion in the central to right central location which is extending superiorly from the disc level and measuring 18 mm in craniocaudal dimension, 5 mm in anterior-posterior dimension, and 8 mm in transverse dimension.  This disc extrusion impinges upon the right ventral surface of the cord.  T11-T12: Minimal disc bulge with ventral surface thecal sac effacement but no cord impingement.  T12-L1: Right central disc protrusion measuring 10 mm in craniocaudal dimension, 8 mm in transverse dimension, and 5 mm in anterior-posterior dimension which causes slight right ventral surface cord remodeling.    Lumbar spine:  No fracture of the lumbar spine.  No paraspinal mass.  The conus medullaris has a normal morphology and terminates at the L1-L2 level.  L1-L2: Mild disc bulge with mild ventral surface thecal sac effacement.  Patent neural foramina.  L2-L3: Degenerative disc disease.  Disc bulge with severe central canal stenosis with the AP diameter at the point of maximal stenosis measuring 7 mm.  Mild bilateral neural foraminal narrowing.  Bilateral facet joint osteoarthritis.  L3-L4: Severe degenerative disc disease.  There is critical central canal stenosis at this level with no contrast-enhanced CSF identified at the disc level.  There is severe crowding of the nerve roots in the compressed thecal sac at this level.  The disc protrusion extends into the right neural foramen causing severe right neural foraminal stenosis and probable impingement upon the exiting right L3 nerve root.  L4-5: Severe degenerative disc disease.  Disc bulge along with ligamentum flavum thickening which causes severe central canal stenosis with the AP diameter of the thecal sac measuring 8 mm the point of maximal stenosis.  The disc protrusion extends into the bilateral neural foramen causing severe right neural foraminal stenosis and moderate left neural foraminal stenosis.  L5-S1: Degenerative disc disease.  Disc bulge with mild  "central canal narrowing.  Mild to moderate left neural foraminal stenosis.    Impression 1. There is critical central canal stenosis at L3-L4 due to a disc bulge and ligamentum flavum thickening.  2. Severe right L3-L4 neural foraminal stenosis with probable impingement upon the exiting right L3 nerve root.  3. Severe central canal stenosis at L2-L3 and L4-L5 due to disc bulges.  4. Severe right L4-5 neural foraminal stenosis.  5. Multiple disc protrusions in the thoracic spine as detailed each level with some ventral surface cord impingement seen at the T6-T7, T8-T9, and T10-T11 levels.  All CT scans at this facility are performed  using dose modulation techniques as appropriate to performed exam including the following:  automated exposure control; adjustment of mA and/or kV according to the patients size (this includes techniques or standardized protocols for targeted exams where dose is matched to indication/reason for exam: i.e. extremities or head);  iterative reconstruction technique.         Review of Systems:  Review of Systems   Constitutional: Negative for fever.   HENT:        Left-sided rash of the face   Eyes: Negative for blurred vision.   Respiratory: Negative for cough and wheezing.    Cardiovascular: Negative for chest pain and orthopnea.   Gastrointestinal: Negative for constipation, diarrhea, nausea and vomiting.   Genitourinary: Negative for dysuria.   Musculoskeletal: Positive for back pain.   Skin: Negative for itching and rash.   Neurological: Positive for weakness.   Endo/Heme/Allergies: Does not bruise/bleed easily.       Physical Exam:  Vitals:  /63 (BP Location: Right arm, Patient Position: Sitting, BP Method: Medium (Automatic))   Pulse 78   Resp 18   Ht 5' 4" (1.626 m)   Wt 58.5 kg (129 lb)   LMP  (LMP Unknown)   BMI 22.14 kg/m²   (reviewed on 1/23/2020)    General: alert and oriented, in no apparent distress.  Gait: in wheelchair.  Skin: no rashes, no discoloration, no " obvious lesions  HEENT: normocephalic, atraumatic. Pupils equal and round.  Cardiovascular: no significant peripheral edema present.  Respiratory: without use of accessory muscles of respiration.    Musculoskeletal - Lumbar Spine:  -severe tenderness to palpation over lower lumbar vertebral body midline and facets bilaterally    Neuro - Lower Extremities:  - RLE Strength:     >> 5/5 strength with right hip flexion/ extension    >> 5/5 strength with right knee flexion/ extension    >> 5/5 strength in right ankle with plantar and dorsiflexion  - LLE Strength:     >> 5/5 strength with left hip flexion/ extension    >> 5/5 strength with knee flexion extension on the left     >> 5/5 strength in left ankle with plantar and dorsiflexion  - Extremity Reflexes: Brisk and symmetric throughout  - Sensory: Sensation to light touch intact bilaterally      Psych:  Mood and affect is appropriate          Assessment  1. 74 y.o. year old patient with PMH of   Past Medical History:   Diagnosis Date    Abnormal ECG 8/5/2019    Aneurysm     Anticoagulant long-term use     Arthritis     CAD in native artery 8/5/2019    COPD (chronic obstructive pulmonary disease)     Diabetes mellitus     Glaucoma     Hypertension     Seizures     Shingles 05/27/2017    Stroke       presenting with pain located left side of her face, left lower extremity, lumbar spine. Diagnoses include:  No diagnosis found.   2. Pain Generators / Etiology: Lumbar Radiculopathy and Lumbar Spondylosis, post herpetic neuralgia  3. Failed Meds (E- Effective, NE- Not Effective):  Celebrex-minimally effective, ibuprofen-minimally effective, gabapentin-minimally effective, Lyrica-minimally effective, Valtrex-minimally effective, steroid-effective  4. Physical Therapy - Completed in the Past  5. Psychological comorbidities - None  6. Anticoagulants / Antiplatelets: Plavix      Plan:  1. Interventional:   - none; consider kyphoplasty for possible vertebral  compression fracture     2. Pharmacologic: Continue Cymbalta 30 mg once daily PRN.  Will provide Toradol injection today in clinic; will provide Tramadol prescription today    3. Rehabilitative:  Will hold off on exercise currently; will restart physical therapy after imaging is complete    4. Diagnostic:  Will obtain lumbar x-ray today     5. Follow up:  Follow up in clinic in 2 weeks    - This condition does not require this patient to take time off of work.   - I discussed the risks, benefits, and alternatives to potential treatment options. All questions and concerns were fully addressed today in clinic. Dr. Disa was consulted regarding the patient plan and agrees.    SCOUT Dias MD  Interventional Pain Medicine  [facility] [place]

## 2020-01-23 NOTE — TELEPHONE ENCOUNTER
----- Message from Bib Philippe sent at 1/23/2020  7:48 AM CST -----  Contact: Brother/Rm Abdalla called in regarding patient and wanted to see if it was time to schedule patient for another injection?  Last one was done on 11/12/19.    Rm's call back number is [phone]

## 2020-01-23 NOTE — PROGRESS NOTES
Subjective:      Established patient    Patient ID: Shireen Felix is a 74 y.o. female.  Patient Active Problem List   Diagnosis    Nasal septal perforation    Postherpetic neuralgia    Conductive hearing loss, unilateral    Chronic otitis media    Chronic allergic rhinitis    Coronary artery disease    Hx of completed stroke    Elevated IgE level    Focal seizures    GERD (gastroesophageal reflux disease)    History of cerebral aneurysm repair    History of tobacco abuse    HTN (hypertension)    Iron deficiency anemia    Spinal stenosis of lumbar region with neurogenic claudication    Mucopurulent chronic bronchitis    UIP (usual interstitial pneumonitis)    Recurrent falls    DM (diabetes mellitus) type II controlled with renal manifestation    CAD in native artery    Neuroforaminal stenosis of lumbar spine    Lumbar radiculopathy    History of stroke    Hypertension associated with diabetes    COPD (chronic obstructive pulmonary disease)    Seizure disorder    Normocytic anemia    Debility    Osteoarthritis of left wrist    Chronic back pain    Chronic pain syndrome    Decreased GFR       Problem list has been reviewed.    Chief Complaint: Pulmonary Fibrosis and Bronchiectasis      HPI      Patients reports stable NYHA II dyspnea    The patient does not have currently have symptoms / an exacerbation.      She reports intermittent  productive cough. Denies hemoptysis.    Her current respiratory therapy regimen is VENTOLIN,DUONEB, BREO which provides relief. She is  adherent with her regimen.        COPD Questionnaire  How often do you cough?: Some of the time  How often do you have phlegm (mucus) in your chest?: Some of the time  How often does your chest feel tight?: Some of the time  When you walk up a hill or one flight of stairs, how often are you breathless?: All of the time  How often are you limited doing any activities at home?: Some of the time  How often are you  confident leaving the house despite your lung condition?: Never  How often do you sleep soundly?: Some of the time  How often do you have energy?: A little of the time  Total score: 27     Immunization status reviewed and up to date.       A full  review of systems, past , family  and social histories was performed except as mentioned in the note above, these are non contributory to the main issues discussed today.       Previous Report Reviewed: lab reports, office notes and radiology reports     The following portions of the patient's history were reviewed and updated as appropriate: She  has a past medical history of Abnormal ECG (8/5/2019), Aneurysm, Anticoagulant long-term use, Arthritis, CAD in native artery (8/5/2019), COPD (chronic obstructive pulmonary disease), Diabetes mellitus, Glaucoma, Hypertension, Seizures, Shingles (05/27/2017), and Stroke.  She  has a past surgical history that includes Brain surgery; Hysterectomy; Transforaminal epidural injection of steroid (Bilateral, 7/23/2019); Cardiac catheterization; Coronary angioplasty; and Epidural steroid injection into lumbar spine (Bilateral, 11/12/2019).  Her family history includes No Known Problems in her father and mother.  She  reports that she quit smoking about 15 years ago. Her smoking use included cigarettes. She has a 10.00 pack-year smoking history. She has never used smokeless tobacco. She reports that she does not drink alcohol or use drugs.  She has a current medication list which includes the following prescription(s): albuterol, albuterol-ipratropium, amlodipine, cholecalciferol (vitamin d3), clindamycin, clopidogrel, denosumab, dexlansoprazole, diltiazem, duloxetine, escitalopram oxalate, eslicarbazepine, fluticasone furoate-vilanterol, gemfibrozil, dicyclomine hcl, lactobacillus acidophilus, loratadine, melatonin, meloxicam, metformin, metoprolol tartrate, mupirocin, ranitidine, ranolazine, sennosides, tolterodine, tramadol,  "valsartan, venlafaxine, and fluticasone propionate.  She is allergic to doxycycline; penicillins; oxycodone; adhesive; betadine [povidone-iodine]; and sulfa (sulfonamide antibiotics)..    Review of Systems   Constitutional: Negative for fever and chills.   HENT: Positive for rhinorrhea, congestion and hearing loss. Negative for nosebleeds.    Eyes: Negative for redness.   Respiratory: Positive for cough, sputum production, wheezing, dyspnea on extertion and use of rescue inhaler. Negative for choking.    Genitourinary: Negative for hematuria.   Endocrine: Diabetes melllitus Negative for cold intolerance.    Musculoskeletal: Positive for arthralgias and back pain.   Skin: Negative for rash.   Gastrointestinal: Positive for acid reflux. Negative for vomiting.   Neurological: Negative for syncope and headaches.        History of stroke  Seizure disorder.  Post herpetic neuralgia   Hematological: Negative for adenopathy. Bleeds easily and excessive bruising.   Psychiatric/Behavioral: Negative for confusion.        Objective:     BP (!) 146/86   Pulse 72   Resp 18   Ht 5' 4" (1.626 m)   Wt 58.5 kg (129 lb)   LMP  (LMP Unknown)   SpO2 98%   BMI 22.14 kg/m²   Body mass index is 22.14 kg/m².     Physical Exam   Constitutional: She is oriented to person, place, and time. She appears well-developed and well-nourished.   HENT:   Head: Normocephalic and atraumatic.   Right Ear: Tympanic membrane normal.   Left Ear: Tympanic membrane normal.   Eyes: Pupils are equal, round, and reactive to light. EOM are normal.   Neck: Normal range of motion. Neck supple.   Cardiovascular: Normal rate, regular rhythm, normal heart sounds and intact distal pulses.   No murmur heard.  Pulmonary/Chest: Effort normal. No respiratory distress.   Abdominal: Soft. Bowel sounds are normal.   Musculoskeletal: Normal range of motion. She exhibits no edema or tenderness.   Neurological: She is alert and oriented to person, place, and time.   Skin: " Skin is warm and dry. No pallor.   Psychiatric: She has a normal mood and affect. Her behavior is normal.       Personal Diagnostic Review      AB19:     19  1413   PH 7.381   PCO2 32.7*   PO2 92   HCO3 19.4*   POCSATURATED 97   BE -6     Mixed respiratory alkalosis / metabolic acidosis, with normal anion gap    (expected Pco2 = 35 - 39)    expected pH = 7.38  expected CO2 = 33  expected HCO3- = 19    Pulmonary function tests:19:  FEV1: 2.00  (95.6 % predicted), FVC:  2.25 (82.7 % predicted), FEV1/FVC:  88.75, TLC:  Not done RV/TLVC: Not done, DLCO: 3.63 (52.0 % predicted)  Normal spirometry. Lung volumes not done. Diffusion capacity is moderately reduced but corrects for alveolar volume.         CT of chest performed on 19 with contrast:       There is a 0.6 cm noncalcified pulmonary nodule in the right lower lobe on image 36, image 62.  This does not appear significantly changed when compared with previous exam no new pulmonary nodule or infiltrate is demonstrated.  No pneumothorax or pleural effusion is present.  There are chronic changes of interstitial lung disease with mild lower lobe bronchiectasis.  Early honeycombing and subpleural changes seen in the lung bases bilaterally.  Fine increased reticulonodular interstitial markings are seen juxtapleural regions as well.    There is atherosclerosis of the aorta coronary vessels.  The heart is normal in size.  There is no pericardial effusion.  No enlarged mediastinal, hilar or axillary lymph nodes are identified.    Images the upper abdomen demonstrate fatty change of pancreas.  No acute findings are demonstrated.    Chronic degenerative changes of the spine are again demonstrated.        Assessment / Plan:       Discussed diagnosis, its evaluation, treatment and usual course. All questions answered.    Problem List Items Addressed This Visit        Pulmonary    Mucopurulent chronic bronchitis - Primary (Chronic)    Current Assessment  & Plan     MCB ROS: taking medications as instructed, no medication side effects noted.   New concerns: NONE.   Exam:  rales noted BILATERALLY.   Assessment:  MCB stable.   Plan: VENTOLIN DUONEB, BREO. Current treatment plan is effective, no change in therapy. PFT, CXR IN 8 MONTHS         Relevant Medications    fluticasone propionate (FLONASE) 50 mcg/actuation nasal spray    albuterol (PROVENTIL/VENTOLIN HFA) 90 mcg/actuation inhaler    Other Relevant Orders    Complete PFT without bronchodilator    X-Ray Chest PA And Lateral    UIP (usual interstitial pneumonitis)    Current Assessment & Plan     Stable on CT chest.    Radiologic surveillance.                Other    Chronic allergic rhinitis    Current Assessment & Plan       Allergy ROS: taking medications as instructed, no medication side effects noted.   New concerns: none.   Exam: nasal and sinus exam normal.   Assessment:  Allergic Rhinitis stable.   Plan:  CLARITIN, SINGULAIR and FLONASE. Current treatment plan is effective, no change in therapy. FLONASE refilled           Relevant Medications    fluticasone furoate-vilanterol (BREO ELLIPTA) 200-25 mcg/dose DsDv diskus inhaler            TIME SPENT WITH PATIENT: Time spent: 40 minutes in face to face  discussion concerning diagnosis, prognosis, review of lab and test results, benefits of treatment as well as management of disease, counseling of patient and coordination of care between various health  care providers . Greater than half the time spent was used for coordination of care and counseling of patient.       Follow up in about 8 months (around 9/23/2020) for Bronchitis, Allergic Rhinitis, UIP.

## 2020-01-23 NOTE — PROGRESS NOTES
Chief Pain Complaint:  Low back pain  Right shoulder pain    Interval HPI: Ms. Felix returns to clinic for follow-up.  She reports that she underwent bilateral L4/5 transforaminal epidural steroid injections in November 2019 reports ongoing 80% symptomatic pain relief.  She has also been participating physical therapy which she has found to be very helpful however 4 days ago she started to do some leg raise is in her bed and she felt severe pain in her low back which has not subsided.  She denies having any radiation except for around her flank into her anterior abdomen.  She feels like the pain sometimes comes straight through from her back to her abdomen.  Today she rates her pain as a 10/10 describes it as a constant aching and sharp pain. She has been using a heating pad which is minimally helpful and she has been holding off on physical therapy at this time. She denies having bowel bladder difficulty.    Interval History: Patient was seen on 8/4/19. At that time she underwent bilateral L3/4 TF XANDER.  The patient reports that she is/was better following the procedure.  she reports 75% pain relief.  She had a fall, then pain increased. She is currently living at [facility]. She is doing at home therapy there, which is helping. She is in wheelchair now but hopes to transition to walker soon.  After the fall, she was not able to get out of the bed.     Initial History of Present Illness:   This patient is a 74 y.o. female who presents today complaining of the above noted pain/s. The patient describes the pain as follows.  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  She reports approximately 2 years ago she had to sudden deaths in the family which caused very stressful time for her and resulted in shingles rash in the left V1 distribution however she reports having excruciating pain  in the left V1 and V2 distributions.  She denies having difficulty with eating food and brushing her teeth on the left side of her face however the left lateral side of her nose gets her excruciating pain.  She has been tried on numerous medications in the past including gabapentin, Lyrica, Valtrex, Celebrex, ibuprofen which have all provided minimal benefit however steroids have been most helpful.  Today she rates her pain as an 8/10 and describes a constant burning sensation the left side of her face in addition to radiation into her bilateral lower extremities, her right shoulder, her left upper extremity occasionally as a pins and needle sensation.  She does report having numbness and weakness in her left lower extremity.  She has been physical therapy which she completed in February of 2019 however this caused most of her low back and leg symptoms to worsen in addition to her post herpetic neuralgia to worsen.  She denies having bowel bladder difficulties.  Her symptoms are worse with activity such as walking in her somewhat improved with rest however she had finds it difficult to get into a comfortable position. She has been using topical lidocaine patches and applying to the left side of her face which does provide significant benefit.  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    Previous Therapy:  Medications:  Celebrex, ibuprofen, gabapentin, Lyrica, Valtrex, steroids  Injections: bilateral L3/4 TF XANDER on 8/4/19 with 75% pain relief, bilateral L4/5 transforaminal epidural steroid injections with 80% symptomatic pain relief  Surgeries: None  Physical Therapy: Completed in the Past, currently participating    Past Surgical History:   Procedure Laterality Date    BRAIN SURGERY      CARDIAC CATHETERIZATION      CORONARY ANGIOPLASTY      EPIDURAL STEROID INJECTION INTO  LUMBAR SPINE Bilateral 11/12/2019    Procedure: TF XANDER L4/5;  Surgeon: Desean Dias MD;  Location: [facility];  Service: Pain Management;  Laterality: Bilateral;    HYSTERECTOMY      TRANSFORAMINAL EPIDURAL INJECTION OF STEROID Bilateral 7/23/2019    Procedure: Bilateral L3/4 Transforaminal Epidural Steroid Injection;  Surgeon: Desean Dias MD;  Location: [facility];  Service: Pain Management;  Laterality: Bilateral;       Imaging / Labs / Studies (reviewed on 1/23/2020):    Results for orders placed during the hospital encounter of 08/04/19   CT Lumbar Spine Without Contrast    Narrative FINDINGS:  Wedge compression deformity of T12 consistent with an old fracture.  No bowing of the posterior cortex.Gas formation within the disc at the L2-3 L3-4 L4-5 and L5-S1 levels.Bilateral facet arthropathy and neural foraminal narrowing seen most significantly at the L3-4 L4-5 and L5-S1 levels with possible bilateral L3-L4 and L5 nerve root impingement in the neural foramina.  13 degree lumbar scoliotic curvature convex to the left side.    Impression Mild lumbar scoliotic curvature convex to the left side with multilevel disc space narrowing seen most significantly at the L3-4 L4-5 and L5-S1 levels.  Bony neural foraminal narrowing with possible nerve root impingement seen most significantly at the L3-L4 level on the right side with possible right-sided L3 nerve root impingement and on the left side at the L4-5 and L5-S1 levels with possible left-sided L4 and L5 nerve root impingement.  Old compression fracture of the T12 vertebral body.  Please see the prior myelogram dated 07/12/2019 showing almost complete block at the L3-L4 level.  All CT scans at this facility are performed  using dose modulation techniques as appropriate to performed exam including the following:  automated exposure control; adjustment of mA and/or kV according to the patients size (this includes techniques or standardized protocols for  targeted exams where dose is matched to indication/reason for exam: i.e. extremities or head);  iterative reconstruction technique.     Results for orders placed during the hospital encounter of 08/04/19   CT Thoracic Spine Without Contrast    Narrative COMPARISON:  None  FINDINGS:  Normal vertebral alignment.  Multilevel degenerative changes of the vertebral endplates seen most significantly at the T7-T8-T8-T9 T9-T10 T10-T11 and the T11-T12 vertebra.  No evidence of an acute compression fracture can be seen.  Cannot exclude an old mild wedge compression deformity of the T12 vertebral body.  Multilevel facet arthropathy.  Multilevel disc space narrowings between T4 and T10. Atherosclerotic calcifications of the abdominal aorta without aneurysmal dilatation.  Multiple subpleural blebs present in the adjacent lungs.    Impression Multilevel degenerative changes as described above.  Mild wedge deformity of the T12 vertebral body consistent with an old compression fracture.  No acute fracture identified.  The patient has had a prior myelogram dated 07/12/2019 showing multiple impingement on the ventral thecal sac secondary to disc disease.  T12 vertebra is similar on the prior study.  All CT scans at this facility are performed  using dose modulation techniques as appropriate to performed exam including the following:  automated exposure control; adjustment of mA and/or kV according to the patients size (this includes techniques or standardized protocols for targeted exams where dose is matched to indication/reason for exam: i.e. extremities or head);  iterative reconstruction technique.       Results for orders placed during the hospital encounter of 07/12/19   X-Ray Hips Bilateral 2 View Incl AP Pelvis    Narrative FINDINGS:  Degenerative changes of the lumbosacral spine and sacroiliac joints.  Bones are demineralized.  Bilateral total hip arthroplasty changes are noted.  No periprosthetic fracture or hardware  complication.     Results for orders placed during the hospital encounter of 08/04/19   X-Ray Hip 2 View Left    Narrative COMPARISON:  07/31/2019  FINDINGS:  Bilateral hip replacements are intact in good alignment.  Left hip appears free of any acute fracture or dislocation.     Results for orders placed during the hospital encounter of 07/31/19   CT Cervical Spine Without Contrast    Narrative FINDINGS:  Osteopenia.  Grade 2 degenerative spondylolisthesis at C3-C4.  Grade 1 degenerative spondylolisthesis at C4-C5.  Vertebral body height is normal.  No acute fractures. No prevertebral soft tissue swelling. Atlanto-occipital articulation is normal.  Congenitally patulous spinal canal.  No significant spinal stenosis.  Moderate left foraminal stenosis at C3-C4.    Impression No acute findings.  Degenerative spondylolisthesis at C3-C4 and C4-C5.  All CT scans at this facility use dose modulation, iterative reconstruction, and/or weight base dosing when appropriate to reduce radiation dose to as low as reasonably achievable.     Results for orders placed during the hospital encounter of 07/12/19   Fl Myelogram 2 Or More Regions    Narrative TECHNIQUE:  After obtaining informed consent from the patient explain the risks, benefits and alternatives to the procedure the patient was placed on the fluoroscopic table in the prone position. The risks included although not limited to bleeding, infection, contrast/drug reaction, nerve root/cord injury,  CSF leak/spinal headache, seizures and herniation. The L2/3 level within isolated under fluoroscopy. The overlying skin was prepped and draped routine sterile fashion. Approximately 1 cc of a 1% lidocaine was used for local anesthesia. Using a 22-gauge spinal needle access was obtained to the thecal sac be a midline translaminar approach. Following removal of stylet spontaneous visualization of clear cerebrospinal fluid was identified. Following this contrast system was attached  and approximately 10 cc of a Omnipaque 300 contrast material was infused under fluoroscopy. Patient tolerated procedure well. No immediate postprocedure complication. The stylet was then reinserted and the  spinal needle system was removed in full. Adhesive bandage was placed over the puncture site. Spot overhead fluoroscopic images in the and lateral projection with additional lateral flexion and extension views.  Total fluoro time was 2.1 minutes and 1 fluoroscopic image was obtained.  COMPARISON:  06/04/2019  FINDINGS:  No complications.  Limited images due to limited mobility.    Impression Successful fluoroscopic myelogram. See dedicated CT myelogram thoracic and lumbar spine.     Results for orders placed during the hospital encounter of 07/12/19   CT Myelography Thoracic Lumbar Spine    Narrative COMPARISON:  None  FINDINGS:  Thoracic spine:  There is no acute fracture of the thoracic spine.  There is an old anterior wedge compression fracture of the T12 vertebral body.  There is a Schmorl's node seen within the superior aspect of the T8 vertebral body.  There is a slight levoscoliosis of the thoracic spine centered at T3.  T1-T2: Unremarkable.  T2-T3: Unremarkable.  T3-T4: There is a disc bulge with left ventral surface thecal sac effacement but no impingement upon the cord.  T4-T5: Unremarkable.  T5-T6: Unremarkable.  T6-T7: There is a central focal disc protrusion which causes right ventral surface cord impingement.  The disc protrusion measures 7 mm in AP dimension, 14 mm in craniocaudal dimension, and 10 mm in transverse dimension.  T7-T8: Unremarkable.  T8-T9: There is a central disc extrusion with the disc extrusion extending inferiorly from the level of the disc and measuring 19 mm in craniocaudal dimension, 8 mm in transverse dimension, and 5 mm in anterior-posterior dimension.  This disc extrusion slightly impinges upon the ventral surface of the cord.  T9-T10: Unremarkable.  T10-T11: There is a  disc extrusion in the central to right central location which is extending superiorly from the disc level and measuring 18 mm in craniocaudal dimension, 5 mm in anterior-posterior dimension, and 8 mm in transverse dimension.  This disc extrusion impinges upon the right ventral surface of the cord.  T11-T12: Minimal disc bulge with ventral surface thecal sac effacement but no cord impingement.  T12-L1: Right central disc protrusion measuring 10 mm in craniocaudal dimension, 8 mm in transverse dimension, and 5 mm in anterior-posterior dimension which causes slight right ventral surface cord remodeling.    Lumbar spine:  No fracture of the lumbar spine.  No paraspinal mass.  The conus medullaris has a normal morphology and terminates at the L1-L2 level.  L1-L2: Mild disc bulge with mild ventral surface thecal sac effacement.  Patent neural foramina.  L2-L3: Degenerative disc disease.  Disc bulge with severe central canal stenosis with the AP diameter at the point of maximal stenosis measuring 7 mm.  Mild bilateral neural foraminal narrowing.  Bilateral facet joint osteoarthritis.  L3-L4: Severe degenerative disc disease.  There is critical central canal stenosis at this level with no contrast-enhanced CSF identified at the disc level.  There is severe crowding of the nerve roots in the compressed thecal sac at this level.  The disc protrusion extends into the right neural foramen causing severe right neural foraminal stenosis and probable impingement upon the exiting right L3 nerve root.  L4-5: Severe degenerative disc disease.  Disc bulge along with ligamentum flavum thickening which causes severe central canal stenosis with the AP diameter of the thecal sac measuring 8 mm the point of maximal stenosis.  The disc protrusion extends into the bilateral neural foramen causing severe right neural foraminal stenosis and moderate left neural foraminal stenosis.  L5-S1: Degenerative disc disease.  Disc bulge with mild  "central canal narrowing.  Mild to moderate left neural foraminal stenosis.    Impression 1. There is critical central canal stenosis at L3-L4 due to a disc bulge and ligamentum flavum thickening.  2. Severe right L3-L4 neural foraminal stenosis with probable impingement upon the exiting right L3 nerve root.  3. Severe central canal stenosis at L2-L3 and L4-L5 due to disc bulges.  4. Severe right L4-5 neural foraminal stenosis.  5. Multiple disc protrusions in the thoracic spine as detailed each level with some ventral surface cord impingement seen at the T6-T7, T8-T9, and T10-T11 levels.  All CT scans at this facility are performed  using dose modulation techniques as appropriate to performed exam including the following:  automated exposure control; adjustment of mA and/or kV according to the patients size (this includes techniques or standardized protocols for targeted exams where dose is matched to indication/reason for exam: i.e. extremities or head);  iterative reconstruction technique.         Review of Systems:  Review of Systems   Constitutional: Negative for fever.   HENT:        Left-sided rash of the face   Eyes: Negative for blurred vision.   Respiratory: Negative for cough and wheezing.    Cardiovascular: Negative for chest pain and orthopnea.   Gastrointestinal: Negative for constipation, diarrhea, nausea and vomiting.   Genitourinary: Negative for dysuria.   Musculoskeletal: Positive for back pain.   Skin: Negative for itching and rash.   Neurological: Positive for weakness.   Endo/Heme/Allergies: Does not bruise/bleed easily.       Physical Exam:  Vitals:  /63 (BP Location: Right arm, Patient Position: Sitting, BP Method: Medium (Automatic))   Pulse 78   Resp 18   Ht 5' 4" (1.626 m)   Wt 58.5 kg (129 lb)   LMP  (LMP Unknown)   BMI 22.14 kg/m²   (reviewed on 1/23/2020)    General: alert and oriented, in no apparent distress.  Gait: in wheelchair.  Skin: no rashes, no discoloration, no " obvious lesions  HEENT: normocephalic, atraumatic. Pupils equal and round.  Cardiovascular: no significant peripheral edema present.  Respiratory: without use of accessory muscles of respiration.    Musculoskeletal - Lumbar Spine:  -severe tenderness to palpation over lower lumbar vertebral body midline and facets bilaterally    Neuro - Lower Extremities:  - RLE Strength:     >> 5/5 strength with right hip flexion/ extension    >> 5/5 strength with right knee flexion/ extension    >> 5/5 strength in right ankle with plantar and dorsiflexion  - LLE Strength:     >> 5/5 strength with left hip flexion/ extension    >> 5/5 strength with knee flexion extension on the left     >> 5/5 strength in left ankle with plantar and dorsiflexion  - Extremity Reflexes: Brisk and symmetric throughout  - Sensory: Sensation to light touch intact bilaterally      Psych:  Mood and affect is appropriate          Assessment  1. 74 y.o. year old patient with PMH of   Past Medical History:   Diagnosis Date    Abnormal ECG 8/5/2019    Aneurysm     Anticoagulant long-term use     Arthritis     CAD in native artery 8/5/2019    COPD (chronic obstructive pulmonary disease)     Diabetes mellitus     Glaucoma     Hypertension     Seizures     Shingles 05/27/2017    Stroke       presenting with pain located left side of her face, left lower extremity, lumbar spine. Diagnoses include:  No diagnosis found.   2. Pain Generators / Etiology: Lumbar Radiculopathy and Lumbar Spondylosis, post herpetic neuralgia  3. Failed Meds (E- Effective, NE- Not Effective):  Celebrex-minimally effective, ibuprofen-minimally effective, gabapentin-minimally effective, Lyrica-minimally effective, Valtrex-minimally effective, steroid-effective  4. Physical Therapy - Completed in the Past  5. Psychological comorbidities - None  6. Anticoagulants / Antiplatelets: Plavix      Plan:  1. Interventional:   - none; consider kyphoplasty for possible vertebral  compression fracture     2. Pharmacologic: Continue Cymbalta 30 mg once daily PRN.  Will provide Toradol injection today in clinic; will provide Tramadol prescription today    3. Rehabilitative:  Will hold off on exercise currently; will restart physical therapy after imaging is complete    4. Diagnostic:  Will obtain lumbar x-ray today     5. Follow up:  Follow up in clinic in 2 weeks    - This condition does not require this patient to take time off of work.   - I discussed the risks, benefits, and alternatives to potential treatment options. All questions and concerns were fully addressed today in clinic. Dr. Dias was consulted regarding the patient plan and agrees.    SCOUT Dias MD  Interventional Pain Medicine  [facility] [place]

## 2020-01-23 NOTE — PROGRESS NOTES
Subjective:   Patient ID:  Shireen Felix is a 74 y.o. female who presents for cardiac consult of Shortness of Breath      HPI  The patient came in today for cardiac consult of Shortness of Breath      Shireen Felix is a 74 y.o. female pt with CHF, CAD, prior CVA, seizure disorder, DM type II, HTN, and ANDREI here for follow up CV eval.     9/12/19  She had a fall last month was admitted to [facility] [place], [place] and had cardiac eval for CP with min elevated trop 1st set, ECHO normal, pain thought due to GI etiology.   She has been doing a lot of strenous activity and has been getting more SOB and CP with it. CP feels like pressure, worse with being more tired.   Had recent pulm eval had nodule, had overall stable lung capacity.   Prior CV - Dr. Garcias    1/23/20  Nuclear stress neg for ischemia. Holter neg. Added Dilt last visit. She has been going through PT for legs back but now pain is worse and will need to get Xrays. She also had cellulitis of hands/arms was on IV abx. Gets more tired/SOB more lately.     Patient feels no leg swelling, no PND, no syncope, no CNS symptoms.    Patient has fairly good exercise tolerance.    Patient is compliant with medications.    ECG - sinus tach V rate 124    Nuclear Quantitative Functional Analysis:   LVEF: 62 %  LVED Volume: 65 ml  LVES Volume: 24 ml    Impression: NORMAL MYOCARDIAL PERFUSION  1. The perfusion scan is free of evidence for myocardial ischemia or injury.   2. Resting wall motion is physiologic.   3. Resting LV function is normal.   4. The ventricular volumes are normal at rest and stress.   5. The extracardiac distribution of radioactivity is normal.     This document has been electronically    SIGNED BY: Zaire Grimaldo MD On: 09/26/2019 12:54    TEST DESCRIPTION   PREDOMINANT RHYTHM  1. Sinus rhythm with heart rates varying between 72 and 121 bpm with an average of 86 bpm.     VENTRICULAR ARRHYTHMIAS  1. There were very rare PVCs recorded totalling 8 and averaging less  than 1 per hour.   2. There were no episodes of ventricular tachycardia.    SUPRA VENTRICULAR ARRHYTHMIAS  1. There were very rare PACs recorded totalling 15 and averaging less than 1 per hour.   2. There were no episodes of sustained supraventricular tachycardia.    8/5/19 ECG  Sinus rhythm with Premature supraventricular complexes  Septal infarct (cited on or before 07-JUN-2019)  Abnormal ECG  When compared with ECG of 31-JUL-2019 11:55,  Premature supraventricular complexes are now Present    2D ECHO  CONCLUSIONS     1 - Normal left ventricular systolic function (EF 60-65%).     2 - Normal left ventricular diastolic function.     3 - Normal right ventricular systolic function .    This document has been electronically    SIGNED BY: Bob Claudio MD On: 08/05/2019 16:32    Past Medical History:   Diagnosis Date    Abnormal ECG 8/5/2019    Aneurysm     Anticoagulant long-term use     Arthritis     CAD in native artery 8/5/2019    COPD (chronic obstructive pulmonary disease)     Diabetes mellitus     Glaucoma     Hypertension     Seizures     Shingles 05/27/2017    Stroke        Past Surgical History:   Procedure Laterality Date    BRAIN SURGERY      CARDIAC CATHETERIZATION      CORONARY ANGIOPLASTY      EPIDURAL STEROID INJECTION INTO LUMBAR SPINE Bilateral 11/12/2019    Procedure: TF XANDER L4/5;  Surgeon: Desean Dias MD;  Location: [facility] PAIN [facility]GT;  Service: Pain Management;  Laterality: Bilateral;    HYSTERECTOMY      TRANSFORAMINAL EPIDURAL INJECTION OF STEROID Bilateral 7/23/2019    Procedure: Bilateral L3/4 Transforaminal Epidural Steroid Injection;  Surgeon: Desean Dias MD;  Location: [facility] PAIN [facility]GT;  Service: Pain Management;  Laterality: Bilateral;       Social History     Tobacco Use    Smoking status: Former Smoker     Packs/day: 1.00     Years: 10.00     Pack years: 10.00     Types: Cigarettes     Last attempt to quit: 4/11/2004     Years since quitting: 15.7    Smokeless tobacco:  Never Used   Substance Use Topics    Alcohol use: No    Drug use: Never       Family History   Problem Relation Age of Onset    No Known Problems Mother     No Known Problems Father        Patient's Medications   New Prescriptions    No medications on file   Previous Medications    ALBUTEROL (PROVENTIL/VENTOLIN HFA) 90 MCG/ACTUATION INHALER    Inhale 2 puffs into the lungs every 4 (four) hours as needed.    ALBUTEROL-IPRATROPIUM (DUO-NEB) 2.5 MG-0.5 MG/3 ML NEBULIZER SOLUTION    Take 3 mLs by nebulization every 6 (six) hours as needed for Wheezing. Rescue    AMLODIPINE (NORVASC) 10 MG TABLET    Take 1 tablet (10 mg total) by mouth once daily.    CHOLECALCIFEROL, VITAMIN D3, (VITAMIN D3) 25 MCG (1,000 UNIT) CAPSULE    Take 1 capsule (1,000 Units total) by mouth once daily. Twice weekly    CLINDAMYCIN (CLEOCIN) 150 MG CAPSULE    Take 1 capsule (150 mg total) by mouth 3 (three) times daily. for 21 days    CLOPIDOGREL (PLAVIX) 75 MG TABLET    Take 1 tablet (75 mg total) by mouth once daily.    DENOSUMAB (PROLIA) 60 MG/ML SYRG    every 6 (six) months.    DEXLANSOPRAZOLE (DEXILANT) 60 MG CAPSULE    Take 1 capsule (60 mg total) by mouth once daily.    DILTIAZEM (CARDIZEM LA) 120 MG 24 HR TABLET    Take 1 tablet (120 mg total) by mouth once daily.    DULOXETINE (CYMBALTA) 30 MG CAPSULE    Take 1 capsule (30 mg total) by mouth once daily.    ESCITALOPRAM OXALATE (LEXAPRO) 20 MG TABLET    Take 1 tablet (20 mg total) by mouth once daily.    ESLICARBAZEPINE (APTIOM) 600 MG TAB    Take 1 tablet by mouth every evening.    FLUTICASONE FUROATE-VILANTEROL (BREO ELLIPTA) 200-25 MCG/DOSE DSDV DISKUS INHALER    Inhale 1 puff into the lungs every evening.    GEMFIBROZIL (LOPID) 600 MG TABLET    Take 1 tablet (600 mg total) by mouth once daily.    GI COCKTAIL (MYLANTA 30 ML, LIDOCAINE 2 % VISCOUS 10 ML, DICYCLOMINE 10 ML) 50 ML    Take 50 mLs by mouth 2 (two) times daily.    LACTOBACILLUS ACIDOPHILUS (PROBIOTIC ACIDOPHILUS ORAL)     Take 1 capsule by mouth 2 (two) times daily.    LORATADINE (CLARITIN) 10 MG TABLET    Take 1 tablet by mouth once daily.    MELATONIN ORAL    Take by mouth nightly as needed.    MELOXICAM (MOBIC) 7.5 MG TABLET    Take 1 tablet (7.5 mg total) by mouth once daily.    METFORMIN (GLUCOPHAGE) 500 MG TABLET    Take 1 tablet (500 mg total) by mouth 2 (two) times daily.    METOPROLOL TARTRATE (LOPRESSOR) 25 MG TABLET    TAKE 1 TABLET BY MOUTH TWICE DAILY WITH FOOD FOR HEART AND BLOOD PRESSURE    MUPIROCIN (BACTROBAN) 2 % OINTMENT    Apply topically 3 (three) times daily.    RANITIDINE (ZANTAC) 150 MG TABLET    TAKE 1 TABLET BY MOUTH TWICE DAILY STOMACH ACID    RANOLAZINE (RANEXA) 1,000 MG TB12    Take 1 tablet (1,000 mg total) by mouth 2 (two) times daily.    SENNOSIDES (SENNA) 8.6 MG CAP    Take by mouth as needed.    TIOTROPIUM (SPIRIVA) 18 MCG INHALATION CAPSULE    Inhale 1 capsule (18 mcg total) into the lungs once daily.    TOLTERODINE (DETROL LA) 4 MG 24 HR CAPSULE    Take 4 mg by mouth once daily.    VALSARTAN (DIOVAN) 320 MG TABLET    Take 1 tablet (320 mg total) by mouth once daily.    VENLAFAXINE 150 MG TR24    Take 150 mg by mouth once daily.   Modified Medications    No medications on file   Discontinued Medications    No medications on file       Review of Systems   Constitutional: Negative.    HENT: Negative.    Eyes: Negative.    Respiratory: Negative.    Cardiovascular: Positive for chest pain.   Gastrointestinal: Negative.    Genitourinary: Negative.    Musculoskeletal: Negative.    Skin: Negative.    Neurological: Negative.    Endo/Heme/Allergies: Negative.    Psychiatric/Behavioral: Negative.    All 12 systems otherwise negative.      Wt Readings from Last 3 Encounters:   01/23/20 58.6 kg (129 lb 3 oz)   01/17/20 59.4 kg (131 lb)   01/14/20 59.5 kg (131 lb 1 oz)     Temp Readings from Last 3 Encounters:   01/14/20 97.6 °F (36.4 °C) (Oral)   01/06/20 98.1 °F (36.7 °C) (Tympanic)   12/30/19 96.8 °F (36 °C)  (Tympanic)     BP Readings from Last 3 Encounters:   01/23/20 112/70   01/17/20 130/80   01/14/20 128/61     Pulse Readings from Last 3 Encounters:   01/23/20 75   01/17/20 99   01/14/20 81       /70 (BP Location: Left arm, Patient Position: Sitting, BP Method: Medium (Manual))   Pulse 75   Wt 58.6 kg (129 lb 3 oz)   LMP  (LMP Unknown)   BMI 22.18 kg/m²     Objective:   Physical Exam   Constitutional: She is oriented to person, place, and time. She appears well-developed and well-nourished. No distress.   HENT:   Head: Normocephalic and atraumatic.   Nose: Nose normal.   Mouth/Throat: Oropharynx is clear and moist.   Eyes: Conjunctivae and EOM are normal. No scleral icterus.   Neck: Normal range of motion. Neck supple. No JVD present. No thyromegaly present.   Cardiovascular: Normal rate, regular rhythm, S1 normal and S2 normal. Exam reveals no gallop, no S3, no S4 and no friction rub.   No murmur heard.  Pulmonary/Chest: Effort normal and breath sounds normal. No stridor. No respiratory distress. She has no wheezes. She has no rales. She exhibits no tenderness.   Abdominal: Soft. Bowel sounds are normal. She exhibits no distension and no mass. There is no tenderness. There is no rebound.   Genitourinary:   Genitourinary Comments: Deferred   Musculoskeletal: Normal range of motion. She exhibits no edema, tenderness or deformity.   Lymphadenopathy:     She has no cervical adenopathy.   Neurological: She is alert and oriented to person, place, and time. She exhibits normal muscle tone. Coordination normal.   Skin: Skin is warm and dry. No rash noted. She is not diaphoretic. No erythema. No pallor.   Psychiatric: She has a normal mood and affect. Her behavior is normal. Judgment and thought content normal.   Nursing note and vitals reviewed.      Lab Results   Component Value Date     (L) 01/14/2020    K 4.5 01/14/2020     01/14/2020    CO2 15 (L) 01/14/2020    BUN 13 01/14/2020    CREATININE 1.0  01/14/2020    GLU 79 01/14/2020    HGBA1C 4.5 12/03/2019    MG 1.9 12/25/2019    AST 12 12/26/2019    ALT 7 (L) 12/26/2019    ALBUMIN 2.4 (L) 12/26/2019    PROT 6.3 12/26/2019    BILITOT 0.2 12/26/2019    WBC 7.51 01/14/2020    HGB 9.9 (L) 01/14/2020    HCT 32.0 (L) 01/14/2020    MCV 92 01/14/2020     01/14/2020    CHOL 161 12/03/2019    HDL 52 12/03/2019    LDLCALC 80.0 12/03/2019    TRIG 145 12/03/2019    BNP 89 07/26/2019     Assessment:      1. CAD in native artery    2. Coronary artery disease of native artery of native heart with stable angina pectoris    3. Hypertension associated with diabetes    4. Essential hypertension    5. Controlled type 2 diabetes mellitus with stage 3 chronic kidney disease, without long-term current use of insulin    6. Focal seizures    7. Hx of completed stroke    8. Chronic obstructive pulmonary disease, unspecified COPD type    9. Palpitations    10. Debility        Plan:   1. CAD with angina, stable   - cont plavix, BB, Ranexa  - r/o ischemia, pharm nuclear stress test  - negative    2. HTN -  - rec low salt diet  - cont meds    3. CHF  - stable  - recent echo normal    4. CVA/Seizures  - cont meds per neuro    5. Palpitation with tachycardia  - cont Dilt, Holter - negative    6. COPD  - cont meds per PCP/pulm    7. Debility  - f/u with PT/OT    Thank you for allowing me to participate in this patient's care. Please do not hesitate to contact me with any questions or concerns. Consult note has been forwarded to the referral physician.

## 2020-01-23 NOTE — ASSESSMENT & PLAN NOTE
MCB ROS: taking medications as instructed, no medication side effects noted.   New concerns: NONE.   Exam:  rales noted BILATERALLY.   Assessment:  MCB stable.   Plan: VENTOLIN DUONEB, BREO. Current treatment plan is effective, no change in therapy. PFT, CXR IN 8 MONTHS

## 2020-01-23 NOTE — TELEPHONE ENCOUNTER
Care giver call about scheduling another injection however pt would need to be seen in the clinic first was able to schedule same day appoint at this time with Dr. Dias//dp

## 2020-01-23 NOTE — PATIENT INSTRUCTIONS
-will start tramadol 50 mg twice daily as needed  -obtain lumbar x-ray today  -hold off on physical therapy at this time  -follow up in clinic in 2 weeks

## 2020-01-23 NOTE — PATIENT INSTRUCTIONS
Lung Anatomy  Your lungs take air in to give your body oxygen, which the body needs to work. Your lungs, like all the tissues in your body, are made up of billions of tiny specialized cells. Old lung cells die and are replaced by new, identical lung cells. This natural process helps ensure healthy lungs.    Date Last Reviewed: 11/1/2016  © 2083-8219 [employer]. [address] [place] [address], [place] [address]. All rights reserved. This information is not intended as a substitute for professional medical care. Always follow your healthcare professional's instructions.

## 2020-01-23 NOTE — ASSESSMENT & PLAN NOTE
Allergy ROS: taking medications as instructed, no medication side effects noted.   New concerns: none.   Exam: nasal and sinus exam normal.   Assessment:  Allergic Rhinitis stable.   Plan:  CLARITIN, SINGULAIR and FLONASE. Current treatment plan is effective, no change in therapy. FLONASE refilled

## 2020-01-24 ENCOUNTER — TELEPHONE (OUTPATIENT)
Dept: PAIN MEDICINE | Facility: CLINIC | Age: 75
End: 2020-01-24

## 2020-01-24 NOTE — TELEPHONE ENCOUNTER
----- Message from Wayne Magallon sent at 1/24/2020 11:19 AM CST -----  Contact: Pt   Type:  Test Results    Who Called: Shireen Felix  Name of Test (Lab/Mammo/Etc): Xray  Date of Test: 01/23/2020  Ordering Provider:   Where the test was performed: [facility]  Would the patient rather a call back or a response via [facility]? Call Back  Best Call Back Number: [phone] (mobile)  Additional Information:

## 2020-01-24 NOTE — TELEPHONE ENCOUNTER
Per EVAN Lott she spoke to pt and stated we will start the process of approval for the injection and follow up with pt when we are able to schedule procedure //dp

## 2020-01-24 NOTE — TELEPHONE ENCOUNTER
PT was concerned about current XRAY per EVAN Lott xray is showing arthrtis as in previous xray.  Was able to get pt schedule for a f/u appointment 01/30/2020 to see about possible injections//dp

## 2020-01-27 ENCOUNTER — TELEPHONE (OUTPATIENT)
Dept: INTERNAL MEDICINE | Facility: CLINIC | Age: 75
End: 2020-01-27

## 2020-01-27 DIAGNOSIS — M48.061 NEUROFORAMINAL STENOSIS OF LUMBAR SPINE: ICD-10-CM

## 2020-01-27 RX ORDER — CYCLOBENZAPRINE HCL 5 MG
TABLET ORAL
Qty: 30 TABLET | Refills: 1 | Status: SHIPPED | OUTPATIENT
Start: 2020-01-27 | End: 2020-06-07

## 2020-01-27 NOTE — TELEPHONE ENCOUNTER
S/w pt's bthr Rm stating pt requesting another XANDER for back pain, a/t chart pt sees Dr. Lott for [facility] and their office will notify pt/brthr when inj approved and to \f/u with their office, dimitry voiced understanding. Brthr also asked about rx for Medical Marijuana for pt, advised brchrystal per [facility] Policy since marijuana still illegal under Federal Law [facility] not it's provider/facilities participate in prescribing substance or referrals for such, dimitry voiced understanding.

## 2020-01-27 NOTE — TELEPHONE ENCOUNTER
----- Message from Ken Manley sent at 1/27/2020  8:09 AM CST -----  Contact: Rm Dao Brother   Name of Who is Calling: Rm Dao Brother       What is the request in detail: Pt is requesting a call back from clinical team.  Please contact to further discuss and advise.          Can the clinic reply by [facility]:       [facility]: [phone]

## 2020-01-28 ENCOUNTER — DOCUMENTATION ONLY (OUTPATIENT)
Dept: PAIN MEDICINE | Facility: CLINIC | Age: 75
End: 2020-01-28

## 2020-01-28 ENCOUNTER — APPOINTMENT (OUTPATIENT)
Dept: RADIOLOGY | Facility: HOSPITAL | Age: 75
End: 2020-01-28
Attending: PEDIATRICS
Payer: MEDICARE

## 2020-01-28 ENCOUNTER — INITIAL CONSULT (OUTPATIENT)
Dept: RHEUMATOLOGY | Facility: CLINIC | Age: 75
End: 2020-01-28
Payer: MEDICARE

## 2020-01-28 ENCOUNTER — TELEPHONE (OUTPATIENT)
Dept: PAIN MEDICINE | Facility: CLINIC | Age: 75
End: 2020-01-28

## 2020-01-28 VITALS
WEIGHT: 125 LBS | HEART RATE: 73 BPM | DIASTOLIC BLOOD PRESSURE: 65 MMHG | BODY MASS INDEX: 21.46 KG/M2 | SYSTOLIC BLOOD PRESSURE: 115 MMHG

## 2020-01-28 DIAGNOSIS — M81.0 AGE-RELATED OSTEOPOROSIS WITHOUT CURRENT PATHOLOGICAL FRACTURE: Primary | ICD-10-CM

## 2020-01-28 DIAGNOSIS — M47.816 LUMBAR FACET ARTHROPATHY: ICD-10-CM

## 2020-01-28 DIAGNOSIS — M47.816 LUMBAR SPONDYLOSIS: Primary | ICD-10-CM

## 2020-01-28 DIAGNOSIS — M81.0 OSTEOPOROSIS, UNSPECIFIED OSTEOPOROSIS TYPE, UNSPECIFIED PATHOLOGICAL FRACTURE PRESENCE: ICD-10-CM

## 2020-01-28 PROCEDURE — 1125F PR PAIN SEVERITY QUANTIFIED, PAIN PRESENT: ICD-10-PCS | Mod: ,,, | Performed by: INTERNAL MEDICINE

## 2020-01-28 PROCEDURE — 1159F PR MEDICATION LIST DOCUMENTED IN MEDICAL RECORD: ICD-10-PCS | Mod: ,,, | Performed by: INTERNAL MEDICINE

## 2020-01-28 PROCEDURE — 99214 OFFICE O/P EST MOD 30 MIN: CPT | Mod: S$PBB,,, | Performed by: INTERNAL MEDICINE

## 2020-01-28 PROCEDURE — 77080 DXA BONE DENSITY AXIAL: CPT | Mod: TC

## 2020-01-28 PROCEDURE — 99214 PR OFFICE/OUTPT VISIT, EST, LEVL IV, 30-39 MIN: ICD-10-PCS | Mod: S$PBB,,, | Performed by: INTERNAL MEDICINE

## 2020-01-28 PROCEDURE — 99214 OFFICE O/P EST MOD 30 MIN: CPT | Mod: PBBFAC,25 | Performed by: INTERNAL MEDICINE

## 2020-01-28 PROCEDURE — 99999 PR PBB SHADOW E&M-EST. PATIENT-LVL IV: CPT | Mod: PBBFAC,,, | Performed by: INTERNAL MEDICINE

## 2020-01-28 PROCEDURE — 99999 PR PBB SHADOW E&M-EST. PATIENT-LVL IV: ICD-10-PCS | Mod: PBBFAC,,, | Performed by: INTERNAL MEDICINE

## 2020-01-28 PROCEDURE — 1159F MED LIST DOCD IN RCRD: CPT | Mod: ,,, | Performed by: INTERNAL MEDICINE

## 2020-01-28 PROCEDURE — 1125F AMNT PAIN NOTED PAIN PRSNT: CPT | Mod: ,,, | Performed by: INTERNAL MEDICINE

## 2020-01-28 PROCEDURE — 77080 DEXA BONE DENSITY SPINE HIP: ICD-10-PCS | Mod: 26,,, | Performed by: RADIOLOGY

## 2020-01-28 PROCEDURE — 96372 THER/PROPH/DIAG INJ SC/IM: CPT | Mod: PBBFAC

## 2020-01-28 PROCEDURE — 77080 DXA BONE DENSITY AXIAL: CPT | Mod: 26,,, | Performed by: RADIOLOGY

## 2020-01-28 RX ORDER — BETAMETHASONE SODIUM PHOSPHATE AND BETAMETHASONE ACETATE 3; 3 MG/ML; MG/ML
6 INJECTION, SUSPENSION INTRA-ARTICULAR; INTRALESIONAL; INTRAMUSCULAR; SOFT TISSUE
Status: COMPLETED | OUTPATIENT
Start: 2020-01-28 | End: 2020-01-28

## 2020-01-28 RX ADMIN — BETAMETHASONE ACETATE AND BETAMETHASONE SODIUM PHOSPHATE 6 MG: 3; 3 INJECTION, SUSPENSION INTRA-ARTICULAR; INTRALESIONAL; INTRAMUSCULAR; SOFT TISSUE at 10:01

## 2020-01-28 NOTE — TELEPHONE ENCOUNTER
Contacted pt.  seeing patient for chronic back pain.  would like to proceed with lumbar medial branch block but pt currently prescribed per :     clindamycin (CLEOCIN) 150 MG capsule 63 capsule 0 1/17/2020 2/7/2020   Sig - Route: Take 1 capsule (150 mg total) by mouth 3 (three) times daily. for 21 days - Oral     Pt states she is not taking this abx as it makes her sick. Pt states she does not have active infection and only taking it for nose bleeds. Clearance sent to  for clarification. Informed pt I will call pt to schedule once we receive response. Pt was able to verbalize all understanding. All questions answered.//lp

## 2020-01-28 NOTE — PROGRESS NOTES
CC:  Chief Complaint   Patient presents with    Consult     osteoporosis-  hand, feet, back pain & leg weakness       History of Present Illness:  Shireen Quinn a 74 y.o.yo female presents was further evaluation of osteoporosis  For her she was diagnosed with osteoporosis in the past and was given 1 dose of Prolia  This was done few years back then was lost to follow-up  She had a recent DEXA done which was suggestive of osteoporosis with a T-score of -3 at right radius  Presents here, for further treatment options  She also takes vitamin-D 1000 units a day, Last levels in June 2019- 25    DEXA :  As above  Current meds for osteopenia/ osteoporosis  :  No  Prior meds for osteopenia/ osteoporosis  : prolia   Hystrectomy: yes  Smoker : yes  Kidney problems :  Yes, CKD stage 2-3   Prior fracture : yes   Steroids :  No      Bleeding gums :  No  Dental caries :  No  Anticipated dental work :  No  Always notify us and dentist prior to any dental procedures while on meds including 1.Bisphosphonates- fosamax, actonel,boniva , reclast    2.Prolia   Jaw pains :  No  GERD :  No    Her other main concern today seems to be low back pain which got aggravated recently, after a fall  She did have x-ray of L-spine done no new fractures noted  She is currently being followed by pain management due to see them in 2 more days for XANDER  She did receive XANDER in the past from pain management with temporary relief  Pain aggravated by any movement  Pain radiates down the right hip, no incontinence or loss of sensations  She was given tramadol which she is currently taking without great relief  Has not taken Flexeril as prescribed yet      Review of Systems:  Constitutional: Denies fever, chills. No recent weight changes.   Fatigue: no  Muscle weakness: no  Headaches: no new headaches  Eyes: No redness .  No recent visual changes.  ENT:  No oral or nasal ulcers.  Card: No chest pain.  Resp: No cough or sob.   Gastro: No nausea or  vomiting.  No heartburn.  Constipation: no  Diarrhea: no  Genito:  No dysuria.  No genital ulcers.  Skin: No rash.  Raynauds:no  Neuro: No numbness / tingling.   Psych: No depression, anxiety  Endo:  no excess thirst.  Heme: no abnormal bleeding or bruising  Clots:none       Past Medical History:   Diagnosis Date    Abnormal ECG 8/5/2019    Aneurysm     Anticoagulant long-term use     Arthritis     CAD in native artery 8/5/2019    COPD (chronic obstructive pulmonary disease)     Diabetes mellitus     Glaucoma     Hypertension     Seizures     Shingles 05/27/2017    Stroke        Past Surgical History:   Procedure Laterality Date    BRAIN SURGERY      CARDIAC CATHETERIZATION      CORONARY ANGIOPLASTY      EPIDURAL STEROID INJECTION INTO LUMBAR SPINE Bilateral 11/12/2019    Procedure: TF XANDER L4/5;  Surgeon: Desean Dias MD;  Location: [facility];  Service: Pain Management;  Laterality: Bilateral;    HYSTERECTOMY      TRANSFORAMINAL EPIDURAL INJECTION OF STEROID Bilateral 7/23/2019    Procedure: Bilateral L3/4 Transforaminal Epidural Steroid Injection;  Surgeon: Desean Dias MD;  Location: [facility];  Service: Pain Management;  Laterality: Bilateral;         Social History     Tobacco Use    Smoking status: Former Smoker     Packs/day: 1.00     Years: 10.00     Pack years: 10.00     Types: Cigarettes     Last attempt to quit: 4/11/2004     Years since quitting: 15.8    Smokeless tobacco: Never Used   Substance Use Topics    Alcohol use: No    Drug use: Never       Family History   Problem Relation Age of Onset    No Known Problems Mother     No Known Problems Father        Review of patient's allergies indicates:   Allergen Reactions    Doxycycline Nausea Only    Penicillins Anaphylaxis, Hives, Shortness Of Breath and Swelling    Oxycodone Other (See Comments)     sleepy  sleepy  sleepy  sleepy  sleepy      Adhesive Rash    Betadine [povidone-iodine] Rash     Pt confirms has  completed CT w/ IV contrast-iodine without reaction or need for pre-medication.    Sulfa (sulfonamide antibiotics) Itching           OBJECTIVE:     Vital Signs   Vitals:    01/28/20 1001   BP: 115/65   Pulse: 73     Physical Exam:  General Appearance:  NAD.   Gait:  Trouble walking, presented in a wheelchair  HEENT: PERRL.  Eyes not dry or injected.  No nasal ulcers.  Mouth not dry, no oral lesions.  Lymph: cervical, supraclavicular or axillary nodes: none abnormal   Cardio: no irregularity of S1 or S2.  No gallops or rubs.   Resp: Normal respiratory motion. Clear to auscultation bilaterally.   No abnormal chest conformation.  Abd: Soft, non-tender, nondistended.  No masses.   Skin: Head and neck,  and extremities examined. No rashes.   Neuro: Ox3.   Cranial nerves II-XII grossly intact.   Sensation intact  in both distal LE and upper extremities to light touch.  Musculoskeletal Exam:    Bilateral hands/wrists osteoarthritic changes    Presented in a wheelchair    Laboratory: I have reviewed all of the patient's relevant lab work available in the medical record and have utilized this in my evaluation and management recommendations today    Imaging : I have reviewed all of the patient's diagnostic/imaging results available in the medical record and have utilized this in my evaluation and management recommendations today.    Notes reviewed  Other procedures: DEXA 2020  FINDINGS:  The L1-L2 vertebral bone mineral density is equal to 0.979 g/cm squared with a T score of -1.6.    The distal 3rd right radius bone mineral density is 0.611 with a T-score of  -3      Impression       Osteoporosis           ASSESSMENT/PLAN:     Age-related osteoporosis without current pathological fracture  -     PTH, intact; Future; Expected date: 01/28/2020  -     Vitamin D; Standing  -     Comprehensive metabolic panel; Standing    Lumbar facet arthropathy  -     betamethasone acetate-betamethasone sodium phosphate injection 6  mg      Restart Prolia 60 mg subcu every 6 months  Continue vitamin-D 1000 units a day  Check PTH and repeat vitamin-D with next labs, BMP 10 days after Prolia due to CKD stage 2    Worsening back pain related to lumbar facet arthropathy  X-ray reviewed no new fractures noted  Celestone IM x1 today  Continue tramadol as needed  Recommend Tylenol 500 mg 3 times a day along with Flexeril as needed  Follow-up with Pain Management is scheduled    6 M return    Thank you for the referral    Risks vs Benefits and potential side effects of medication prescribed today were discussed with patient. Medication literature given to patient up discharge  Went over uptodate information /literature on the meds prescribed today     If you are unsure what to do please call our office for instruction. [facility] [phone]        Disclaimer: This note was prepared using voice recognition system and is likely to have sound alike errors and is not proof read.  Please call me with any questions.

## 2020-01-28 NOTE — LETTER
January 28, 2020      [place]  [address]  [place] [address]           [facility]  [address]  [place] [address]  Phone: [phone]  Fax: [phone]          Patient: Shireen Felix   MR Number: [id number]   YOB: 1945   Date of Visit: 1/28/2020       Dear Dr. CARLEE Chaudhari Jr.:    Thank you for referring Shireen Felix to me for evaluation. Attached you will find relevant portions of my assessment and plan of care.    If you have questions, please do not hesitate to call me. I look forward to following Shireen Felix along with you.    Sincerely,    Iraida Gaona MD    Enclosure  CC:  No Recipients    If you would like to receive this communication electronically, please contact [email or web address] or [phone] to request more information on [facility] access.    For providers and/or their staff who would like to refer a patient to [facility], please contact us through our one-stop-shop provider referral line, [facility], at [phone].    If you feel you have received this communication in error or would no longer like to receive these types of communications, please e-mail [email or web address]

## 2020-01-28 NOTE — TELEPHONE ENCOUNTER
----- Message from Mary Martin sent at 1/28/2020 10:03 AM CST -----  Contact: Rm Ojeda/brother [phone]  States that he is calling to speak to nurse regarding scheduling pt procedure. Please call back at [phone]//thank you acc

## 2020-01-28 NOTE — PROGRESS NOTES
Administered 1cc of Betamethasone 6mg/cc to Right Ventrogluteal.  Patient tolerated well. No acute reaction noted to site.  Patient instructed on S/S to report. Patient verbalized understanding.    Lot:[id number]  Exp:12/24/20

## 2020-01-28 NOTE — PROGRESS NOTES
Spoke with Ms. Felix's  Brother regarding x-ray showing lumbar degenerative disc disease, lumbar spondylosis.  We will cancel her clinic appointment on Thursday of this week and schedule her for bilateral lumbar medial branch blocks targeting the L4/5 and L5/S1 facet joints.  He is in agreement and has no further questions.      SCOUT Dias MD  Interventional Pain Medicine  [facility] [place]

## 2020-01-29 ENCOUNTER — TELEPHONE (OUTPATIENT)
Dept: PAIN MEDICINE | Facility: CLINIC | Age: 75
End: 2020-01-29

## 2020-01-29 NOTE — TELEPHONE ENCOUNTER
notified. Ok to move forward with injection per . Pt notified of risks associated with possible infection since we are not clear if pt needs to be on  antibiotics or not.     Contacted pt. Injection scheduled. Pre injection instructions taught and mailed. Orders entered. Pt was able to verbalize all understanding. All questions answered.//lp

## 2020-01-29 NOTE — TELEPHONE ENCOUNTER
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 She should stop the antibiotic if it is causing her symptoms and she cannot tolerate it. She should maintain her follow up at my clinic and we can proceed from there. Thank you.     ----- Message -----  From: Radha Phelan LPN  Sent: 1/28/2020   4:34 PM CST  To: Ha Peterson MD, MD Dr. Nicholas Beasley,      seeing patient for chronic back pain.  would like to proceed with lumbar medial branch block but pt currently prescribed:     clindamycin (CLEOCIN) 150 MG capsule 63 capsule 0 1/17/2020 2/7/2020   Sig - Route: Take 1 capsule (150 mg total) by mouth 3 (three) times daily. for 21 days - Oral    Pt states she is not taking this abx as it makes her sick. Pt states she does not have active infection and only taking it for nose bleeds.  would like to know if abxfor treatment of active infection? Please advise.     Thanks,   Radha       ----- Message -----  From: Mary Martin  Sent: 1/28/2020  10:03 AM CST  To: Larissa Anton Staff    States that he is calling to speak to nurse regarding scheduling pt procedure. Please call back at [phone]//thank you acc

## 2020-01-29 NOTE — TELEPHONE ENCOUNTER
----- Message from Ha Peterson MD sent at 1/29/2020  2:55 PM CST -----  Contact: Rm Ojeda/brother [phone]  I do not clear people for procedures. 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 I was treating her with an antibiotic that she stopped because she cannot tolerate it. I am not treating her with another antibiotic currently because of her extensive allergies. I suspect that she has suffered with the nasal sinus issue for many years. I do not know if her sinusitis will impact what you are planning to do.     ----- Message -----  From: Radha Phelan LPN  Sent: 1/29/2020  11:46 AM CST  To: MD Dr. Nicholas Moralez.     I spoke to your nurse earlier. She stated that you have cleared this pt of infection and she can proceed with medial branch block steroid injection for back pain. I just would like to clarify.     ThanksRadha  ----- Message -----  From: Ha Peterson MD  Sent: 1/29/2020  10:34 AM CST  To: Radha Phelan LPN    She is on my schedule to be seen on 2/18/20.     ----- Message -----  From: Radha Phelan LPN  Sent: 1/29/2020   8:31 AM CST  To: Ha Peterson MD    Good Morning Dr.Hall Lombardi would like to know when she is scheduled for follow up as I do not see it in Epic.     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 She should stop the antibiotic if it is causing her symptoms and she cannot tolerate it. She should maintain her follow up at my clinic and we can proceed from there. Thank you.     ----- Message -----  From: Radha Phelan LPN  Sent: 1/28/2020   4:34 PM CST  To: Ha Peterson MD, MD Dr. Nicholas Beasley Dr.Burns seeing patient for chronic back  pain.  would like to proceed with lumbar medial branch block but pt currently prescribed:     clindamycin (CLEOCIN) 150 MG capsule 63 capsule 0 1/17/2020 2/7/2020   Sig - Route: Take 1 capsule (150 mg total) by mouth 3 (three) times daily. for 21 days - Oral    Pt states she is not taking this abx as it makes her sick. Pt states she does not have active infection and only taking it for nose bleeds.  would like to know if abxfor treatment of active infection? Please advise.     Radha Dunn       ----- Message -----  From: Mary Martin  Sent: 1/28/2020  10:03 AM CST  To: Larissa Anton Staff    States that he is calling to speak to nurse regarding scheduling pt procedure. Please call back at [phone]//thank you acc

## 2020-01-30 NOTE — PRE-PROCEDURE INSTRUCTIONS
Spoke with   Patients brother serjio    regarding procedure scheduled on 1/31/2020  Arrival time 1040  Has patient been sick with fever or on antibiotics within the last 7 days? no  Has patient received a vaccination within the last 7 days? no  Has the patient stopped all medications as directed? Not required  Does patient have a pacemaker and or defibrillator?  no  Does the patient have a ride to and from procedure and someone reliable to remain with patient? Yes, brother serjio  Is the patient diabetic? yes  Does the patient have sleep apnea? Or use O2 at home? No no  Is the patient receiving sedation? yes  Is the patient instructed to remain NPO beginning at midnight the night before their procedure? yes  Procedure location confirmed with patient? yes  Travel screening: negative

## 2020-01-31 ENCOUNTER — HOSPITAL ENCOUNTER (OUTPATIENT)
Facility: HOSPITAL | Age: 75
Discharge: HOME | End: 2020-01-31
Attending: PAIN MEDICINE | Admitting: PAIN MEDICINE
Payer: MEDICARE

## 2020-01-31 VITALS
HEART RATE: 69 BPM | TEMPERATURE: 97 F | OXYGEN SATURATION: 100 % | BODY MASS INDEX: 21.36 KG/M2 | DIASTOLIC BLOOD PRESSURE: 58 MMHG | HEIGHT: 64 IN | SYSTOLIC BLOOD PRESSURE: 132 MMHG | RESPIRATION RATE: 18 BRPM | WEIGHT: 125.13 LBS

## 2020-01-31 DIAGNOSIS — M47.816 LUMBAR SPONDYLOSIS: Primary | ICD-10-CM

## 2020-01-31 LAB — POCT GLUCOSE: 79 MG/DL (ref 70–110)

## 2020-01-31 PROCEDURE — 99152 PR MOD CONSCIOUS SEDATION, SAME PHYS, 5+ YRS, FIRST 15 MIN: ICD-10-PCS | Mod: ,,, | Performed by: PAIN MEDICINE

## 2020-01-31 PROCEDURE — 64494 INJ PARAVERT F JNT L/S 2 LEV: CPT | Mod: 50 | Performed by: PAIN MEDICINE

## 2020-01-31 PROCEDURE — 99152 MOD SED SAME PHYS/QHP 5/>YRS: CPT | Mod: ,,, | Performed by: PAIN MEDICINE

## 2020-01-31 PROCEDURE — 64495 INJ PARAVERT F JNT L/S 3 LEV: CPT | Mod: 50 | Performed by: PAIN MEDICINE

## 2020-01-31 PROCEDURE — 64493 INJ PARAVERT F JNT L/S 1 LEV: CPT | Mod: 50,,, | Performed by: PAIN MEDICINE

## 2020-01-31 PROCEDURE — 64494 INJ PARAVERT F JNT L/S 2 LEV: CPT | Mod: 50,,, | Performed by: PAIN MEDICINE

## 2020-01-31 PROCEDURE — 27000221 HC OXYGEN, UP TO 24 HOURS

## 2020-01-31 PROCEDURE — 99152 MOD SED SAME PHYS/QHP 5/>YRS: CPT | Performed by: PAIN MEDICINE

## 2020-01-31 PROCEDURE — 64494 PR INJ DX/THER AGNT PARAVERT FACET JOINT,IMG GUIDE,LUMBAR/SAC, 2ND LEVEL: ICD-10-PCS | Mod: 50,,, | Performed by: PAIN MEDICINE

## 2020-01-31 PROCEDURE — 64493 INJ PARAVERT F JNT L/S 1 LEV: CPT | Mod: 50 | Performed by: PAIN MEDICINE

## 2020-01-31 PROCEDURE — 25000003 PHARM REV CODE 250: Performed by: PAIN MEDICINE

## 2020-01-31 PROCEDURE — 64493 PR INJ DX/THER AGNT PARAVERT FACET JOINT,IMG GUIDE,LUMBAR/SAC,1ST LVL: ICD-10-PCS | Mod: 50,,, | Performed by: PAIN MEDICINE

## 2020-01-31 PROCEDURE — 63600175 PHARM REV CODE 636 W HCPCS: Performed by: PAIN MEDICINE

## 2020-01-31 RX ORDER — LIDOCAINE HYDROCHLORIDE 20 MG/ML
INJECTION, SOLUTION EPIDURAL; INFILTRATION; INTRACAUDAL; PERINEURAL
Status: DISCONTINUED | OUTPATIENT
Start: 2020-01-31 | End: 2020-01-31 | Stop reason: HOSPADM

## 2020-01-31 RX ORDER — MIDAZOLAM HYDROCHLORIDE 1 MG/ML
INJECTION, SOLUTION INTRAMUSCULAR; INTRAVENOUS
Status: DISCONTINUED | OUTPATIENT
Start: 2020-01-31 | End: 2020-01-31 | Stop reason: HOSPADM

## 2020-01-31 RX ORDER — FENTANYL CITRATE 50 UG/ML
INJECTION, SOLUTION INTRAMUSCULAR; INTRAVENOUS
Status: DISCONTINUED | OUTPATIENT
Start: 2020-01-31 | End: 2020-01-31 | Stop reason: HOSPADM

## 2020-01-31 NOTE — OP NOTE
Procedure: Lumbar Medial Branch Block under Fluoroscopic Guidance    Side: bilateral     Level:  Sacral ala (Corresponding to the L5 dorsal ramus), L5 transverse process (Corresponding to the L4 medial branch) and L4 transverse process (Corresponding to the L3 medial branch)     PROCEDURE DATE: 1/31/2020    Pre-operative Diagnosis: Lumbar Spondylosis  Post-operative Diagnosis: Lumbar Spondylosis    Provider: SCOUT Dias MD  Assistant(s): none    Anesthesia: Local, IV Sedation    >> 1 mg of VERSED    >> 25 mcg of FENTANYL     Indication: Low back pain without radiculopathy. Symptoms unresponsive to conservative treatments. Fluoroscopy was used to optimize visualization of needle placement and to maximize safety.     Procedure Description / Technique:  The patient was seen and identified in the preoperative area. Risks, benefits, complications, and alternatives were discussed with the patient. The patient agreed to proceed with the procedure and signed the consent. The site and side of the procedure was identified and marked. An iv was started.     The patient was taken to the procedural suite and positioned in prone orientation on the procedure table. A pillow was placed under the abdomen to reduce lumbar lordosis. A time out was performed. The procedure, site, side, and allergies were stated and agreed to by all present. The lumbosacral area was widely prepped with ChloraPrep. The procedural site was draped in usual sterile fashion. Vital signs were closely monitored throughout this procedure. Conscious sedation was used for this procedure to decrease patient anxiety.    The Left sacral ala and superior articular process was identified and marked on AP fluoroscopic imaging. Subcutaneous tissues were localized using 1% PF Lidocaine to improve patient comfort. A 25 gauge 3.5 inch spinal needle was advance until the needle rested on OS at the interface of the sacral ala and the base of the sacral superior articular  process. After negative aspiration, 1ml of 2% lidocaine was injected. No pain or paresthesia was noted on injection. After bilateral injection, the fluoroscope was rotated into the oblique view and the spinal needle was advanced towards the eye of the alberto dog until os was contacted. 1ml of 2% lidocaine was injected after negative aspiration. No pain or paresthesia was noted. This technique was repeated at each of the above noted levels. The spinal needle was removed intact following injection at each targeted site. The stylet was replaced prior to needle removal at each site.    Description of Findings: Not applicable    Prosthetic devices, grafts, tissues, or devices implanted: None    Specimen Removed: No    ESTIMATED BLOOD LOSS: minimal    COMPLICATIONS: None    DISPOSITION / PLANS: The patient was transferred to the recovery area in a stable condition for observation. The patient was reexamined prior to discharge. There was no evidence of acute neurologic injury following the procedure.  Patient was discharged from the recovery room after meeting discharge criteria. Home discharge instructions were given to the patient by the staff.

## 2020-01-31 NOTE — DISCHARGE INSTRUCTIONS

## 2020-01-31 NOTE — INTERVAL H&P NOTE
The patient has been examined and the H&P has been reviewed:    I concur with the findings and changes have been noted since the H&P was written: will perform bilateral L3-5 lumbar MBBS    Anesthesia/Surgery risks, benefits and alternative options discussed and understood by patient/family.          There are no hospital problems to display for this patient.

## 2020-02-03 ENCOUNTER — TELEPHONE (OUTPATIENT)
Dept: PAIN MEDICINE | Facility: CLINIC | Age: 75
End: 2020-02-03

## 2020-02-03 ENCOUNTER — TELEPHONE (OUTPATIENT)
Dept: PULMONOLOGY | Facility: CLINIC | Age: 75
End: 2020-02-03

## 2020-02-03 DIAGNOSIS — M47.816 SPONDYLOSIS WITHOUT MYELOPATHY OR RADICULOPATHY, LUMBAR REGION: Primary | ICD-10-CM

## 2020-02-03 NOTE — TELEPHONE ENCOUNTER
----- Message from Bennett Talamantes sent at 2/3/2020  8:10 AM CST -----  Contact: Brother-Rm Ojeda-[phone]  Pt's brother, would like return call from nurse to provide information for patient.  Please call back at [phone].  Md Edin

## 2020-02-03 NOTE — TELEPHONE ENCOUNTER
Initiated prior authorization request with patient's insurance for Albuterol Sulfate  (90 Base)MCG/ACT aerosol prescription. Submitted online via covermymeds.[email or web address] (request key [id number]). Your information has been submitted to [employer]. [facility] will review the request and will issue a decision, typically within 1-3 days from your submission. You can check the updated outcome later by reopening this request. If [employer] has not responded in 1-3 days or if you have any questions about your ePA request, please contact [facility] at [phone]. If you think there may be a problem with your PA request, use our live chat feature at the bottom right. -- PA case ID [id number].

## 2020-02-04 ENCOUNTER — TELEPHONE (OUTPATIENT)
Dept: PAIN MEDICINE | Facility: CLINIC | Age: 75
End: 2020-02-04

## 2020-02-04 DIAGNOSIS — M54.50 LUMBAR PAIN: Primary | ICD-10-CM

## 2020-02-04 DIAGNOSIS — M54.6 THORACIC SPINE PAIN: ICD-10-CM

## 2020-02-04 NOTE — DISCHARGE SUMMARY
[facility]  Short Stay  Discharge Summary    Admit Date: 1/31/2020    Discharge Date and Time: 1/31/2020 12:15 PM      Discharge Attending Physician: SCOUT Dias MD     Hospital Course (synopsis of major diagnoses, care, treatment, and services provided during the course of the hospital stay): Patient was admitted to Pre-op where informed consent was signed.  The patient was then taken to the procedure suite where the procedure was performed.  The patient was then return to the Pre-Op area and discharge was performed.     Final Diagnoses:    Principal Problem: <principal problem not specified>   Secondary Diagnoses: There are no hospital problems to display for this patient.      Discharged Condition: good    Disposition: Home or Self Care    Follow up/Patient Instructions:    Medications:  Reconciled Home Medications:      Medication List      CONTINUE taking these medications    albuterol 90 mcg/actuation inhaler  Commonly known as:  PROVENTIL/VENTOLIN HFA  Inhale 2 puffs into the lungs every 4 (four) hours as needed.     albuterol-ipratropium 2.5 mg-0.5 mg/3 mL nebulizer solution  Commonly known as:  DUO-NEB  Take 3 mLs by nebulization every 6 (six) hours as needed for Wheezing. Rescue     amLODIPine 10 MG tablet  Commonly known as:  NORVASC  Take 1 tablet (10 mg total) by mouth once daily.     Aptiom 600 mg Tab  Generic drug:  eslicarbazepine  Take 1 tablet by mouth every evening.     cholecalciferol (vitamin D3) 25 mcg (1,000 unit) capsule  Commonly known as:  Vitamin D3  Take 1 capsule (1,000 Units total) by mouth once daily. Twice weekly     clindamycin 150 MG capsule  Commonly known as:  CLEOCIN  Take 1 capsule (150 mg total) by mouth 3 (three) times daily. for 21 days     clopidogreL 75 mg tablet  Commonly known as:  PLAVIX  Take 1 tablet (75 mg total) by mouth once daily.     cyclobenzaprine 5 MG tablet  Commonly known as:  FLEXERIL  TAKE 1 TABLET BY MOUTH THREE TIMES DAILY AS NEEDED  FOR MUSCLE SPASMS     dexlansoprazole 60 mg capsule  Commonly known as:  DEXILANT  Take 1 capsule (60 mg total) by mouth once daily.     diltiaZEM 120 mg 24 hr tablet  Commonly known as:  CARDIZEM LA  Take 1 tablet (120 mg total) by mouth once daily.     DULoxetine 30 MG capsule  Commonly known as:  CYMBALTA  Take 1 capsule (30 mg total) by mouth once daily.     escitalopram oxalate 20 MG tablet  Commonly known as:  LEXAPRO  Take 1 tablet (20 mg total) by mouth once daily.     fluticasone furoate-vilanterol 200-25 mcg/dose Dsdv diskus inhaler  Commonly known as:  Breo Ellipta  Inhale 1 puff into the lungs every evening.     fluticasone propionate 50 mcg/actuation nasal spray  Commonly known as:  FLONASE  1 spray (50 mcg total) by Each Nostril route once daily.     gemfibrozil 600 MG tablet  Commonly known as:  LOPID  Take 1 tablet (600 mg total) by mouth once daily.     GI COCKTAIL SIMPLE RECORD  Take 50 mLs by mouth 2 (two) times daily.     loratadine 10 mg tablet  Commonly known as:  CLARITIN  Take 1 tablet by mouth once daily.     MELATONIN ORAL  Take by mouth nightly as needed.     meloxicam 7.5 MG tablet  Commonly known as:  MOBIC  Take 1 tablet (7.5 mg total) by mouth once daily.     metFORMIN 500 MG tablet  Commonly known as:  GLUCOPHAGE  Take 1 tablet (500 mg total) by mouth 2 (two) times daily.     metoprolol tartrate 25 MG tablet  Commonly known as:  LOPRESSOR  TAKE 1 TABLET BY MOUTH TWICE DAILY WITH FOOD FOR HEART AND BLOOD PRESSURE     mupirocin 2 % ointment  Commonly known as:  BACTROBAN  Apply topically 3 (three) times daily.     PROBIOTIC ACIDOPHILUS ORAL  Take 1 capsule by mouth 2 (two) times daily.     Prolia 60 mg/mL Syrg  Generic drug:  denosumab  every 6 (six) months.     ranitidine 150 MG tablet  Commonly known as:  ZANTAC  TAKE 1 TABLET BY MOUTH TWICE DAILY STOMACH ACID     ranolazine 1,000 mg Tb12  Commonly known as:  RANEXA  Take 1 tablet (1,000 mg total) by mouth 2 (two) times daily.      senna 8.6 mg Cap  Generic drug:  sennosides  Take by mouth as needed.     tolterodine 4 MG 24 hr capsule  Commonly known as:  DETROL LA  Take 4 mg by mouth once daily.     traMADol 50 mg tablet  Commonly known as:  ULTRAM  Take 1 tablet (50 mg total) by mouth every 12 (twelve) hours as needed for Pain. Greater than 7 day supply medically necessary.     valsartan 320 MG tablet  Commonly known as:  DIOVAN  Take 1 tablet (320 mg total) by mouth once daily.     venlafaxine 150 mg Tr24  Take 150 mg by mouth once daily.          Discharge Procedure Orders   Diet general     Call MD for:  severe uncontrolled pain     Call MD for:  difficulty breathing, headache or visual disturbances     Call MD for:  redness, tenderness, or signs of infection (pain, swelling, redness, odor or green/yellow discharge around incision site)     Activity as tolerated

## 2020-02-04 NOTE — TELEPHONE ENCOUNTER
----- Message from Jarrell Dye MD sent at 2/4/2020 11:59 AM CST -----  Ok she can hold Plavix for 7 days prior and resume post procedure. Thanks    - PM  ----- Message -----  From: Radha Phelan LPN  Sent: 2/4/2020  11:49 AM CST  To: Jarrell Dye MD    Good afternoon!     Dr. Dias would like to move forward with LUMBAR RFA for patient. Pt is currently on Plavix 75mg and will need to d/c 7 days prior to having this injection along with all other blood thinners including aspirin. Please advise or contact me at ext [phone] with questions. Thanks//LP

## 2020-02-06 ENCOUNTER — HOSPITAL ENCOUNTER (OUTPATIENT)
Dept: RADIOLOGY | Facility: HOSPITAL | Age: 75
Discharge: HOME | End: 2020-02-06
Attending: PAIN MEDICINE
Payer: MEDICARE

## 2020-02-06 DIAGNOSIS — M54.50 LUMBAR PAIN: ICD-10-CM

## 2020-02-06 DIAGNOSIS — M54.6 THORACIC SPINE PAIN: ICD-10-CM

## 2020-02-06 PROCEDURE — 72128 CT CHEST SPINE W/O DYE: CPT | Mod: TC

## 2020-02-06 PROCEDURE — 72131 CT LUMBAR SPINE W/O DYE: CPT | Mod: TC

## 2020-02-07 ENCOUNTER — PATIENT MESSAGE (OUTPATIENT)
Dept: PAIN MEDICINE | Facility: CLINIC | Age: 75
End: 2020-02-07

## 2020-02-10 ENCOUNTER — TELEPHONE (OUTPATIENT)
Dept: PAIN MEDICINE | Facility: CLINIC | Age: 75
End: 2020-02-10

## 2020-02-10 NOTE — TELEPHONE ENCOUNTER
----- Message from Bethany Lofton sent at 2/10/2020  7:20 AM CST -----  Contact: Rm - [place]  Request a call concerning this pt, no additional info given and can be reached at [phone]///thxMW

## 2020-02-12 ENCOUNTER — INFUSION (OUTPATIENT)
Dept: INFUSION THERAPY | Facility: HOSPITAL | Age: 75
End: 2020-02-12
Attending: INTERNAL MEDICINE
Payer: MEDICARE

## 2020-02-12 ENCOUNTER — OFFICE VISIT (OUTPATIENT)
Dept: PAIN MEDICINE | Facility: CLINIC | Age: 75
End: 2020-02-12
Payer: MEDICARE

## 2020-02-12 VITALS
SYSTOLIC BLOOD PRESSURE: 131 MMHG | HEART RATE: 118 BPM | BODY MASS INDEX: 21.34 KG/M2 | DIASTOLIC BLOOD PRESSURE: 77 MMHG | HEIGHT: 64 IN | WEIGHT: 125 LBS

## 2020-02-12 VITALS — BODY MASS INDEX: 21.46 KG/M2 | WEIGHT: 125 LBS

## 2020-02-12 DIAGNOSIS — M81.0 AGE-RELATED OSTEOPOROSIS WITHOUT CURRENT PATHOLOGICAL FRACTURE: Primary | ICD-10-CM

## 2020-02-12 DIAGNOSIS — M54.14 THORACIC RADICULOPATHY: ICD-10-CM

## 2020-02-12 DIAGNOSIS — M47.816 LUMBAR SPONDYLOSIS: ICD-10-CM

## 2020-02-12 DIAGNOSIS — M54.6 THORACIC SPINE PAIN: Primary | ICD-10-CM

## 2020-02-12 PROCEDURE — 99999 PR PBB SHADOW E&M-EST. PATIENT-LVL IV: CPT | Mod: PBBFAC,,, | Performed by: PAIN MEDICINE

## 2020-02-12 PROCEDURE — 63600175 PHARM REV CODE 636 W HCPCS: Mod: JG | Performed by: INTERNAL MEDICINE

## 2020-02-12 PROCEDURE — 99214 OFFICE O/P EST MOD 30 MIN: CPT | Mod: S$PBB,,, | Performed by: PAIN MEDICINE

## 2020-02-12 PROCEDURE — 99214 PR OFFICE/OUTPT VISIT, EST, LEVL IV, 30-39 MIN: ICD-10-PCS | Mod: S$PBB,,, | Performed by: PAIN MEDICINE

## 2020-02-12 PROCEDURE — 99214 OFFICE O/P EST MOD 30 MIN: CPT | Mod: PBBFAC,25 | Performed by: PAIN MEDICINE

## 2020-02-12 PROCEDURE — 96372 THER/PROPH/DIAG INJ SC/IM: CPT

## 2020-02-12 PROCEDURE — 99999 PR PBB SHADOW E&M-EST. PATIENT-LVL IV: ICD-10-PCS | Mod: PBBFAC,,, | Performed by: PAIN MEDICINE

## 2020-02-12 RX ADMIN — DENOSUMAB 60 MG: 60 INJECTION SUBCUTANEOUS at 02:02

## 2020-02-12 NOTE — PATIENT INSTRUCTIONS
Continue tramadol as prescribed  -will schedule for right T12/L1 transforaminal epidural steroid injection  -will schedule for right L3-5 lumbar radiofrequency ablation  -will then schedule for left L3-5 lumbar radiofrequency ablation  -follow up in clinic after the radiofrequency ablation is a complete

## 2020-02-12 NOTE — PROGRESS NOTES
Chief Pain Complaint:  Low back pain  Right shoulder pain    Interval HPI:  Ms. Felix returns to clinic for follow-up.  She underwent bilateral lumbar medial branch blocks on January 31, 2020 and reports 100% symptomatic pain relief for approximately 1 week and his symptoms slowly began to return.  She continues to have some right-sided lower thoracic posterior pain which was not completely dressed with the medial branch blocks.  Today she rates her pain as an 8/10 which is located primarily in the axial lumbar spine and the lower right posterior thoracic region.  She denies having numbness weakness in the legs but does continue to have a constant aching and throbbing sensation in the low back.    Interval HPI:   Ms. Felix returns to clinic for follow-up.  She reports that she underwent bilateral L4/5 transforaminal epidural steroid injections in November 2019 reports ongoing 80% symptomatic pain relief.  She has also been participating physical therapy which she has found to be very helpful however 4 days ago she started to do some leg raise is in her bed and she felt severe pain in her low back which has not subsided.  She denies having any radiation except for around her flank into her anterior abdomen.  She feels like the pain sometimes comes straight through from her back to her abdomen.  Today she rates her pain as a 10/10 describes it as a constant aching and sharp pain. She has been using a heating pad which is minimally helpful and she has been holding off on physical therapy at this time. She denies having bowel bladder difficulty.    Interval History: Patient was seen on 8/4/19. At that time she underwent bilateral L3/4 TF XANDER.  The patient reports that she is/was better following the procedure.  she reports 75% pain relief.  She had a fall, then pain increased. She is currently living at [facility]. She is doing at home therapy there, which is helping. She is in wheelchair now but hopes to transition  to walker soon.  After the fall, she was not able to get out of the bed.     Initial History of Present Illness:   This patient is a 74 y.o. female who presents today complaining of the above noted pain/s. The patient describes the pain as follows.  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  She reports approximately 2 years ago she had to sudden deaths in the family which caused very stressful time for her and resulted in shingles rash in the left V1 distribution however she reports having excruciating pain in the left V1 and V2 distributions.  She denies having difficulty with eating food and brushing her teeth on the left side of her face however the left lateral side of her nose gets her excruciating pain.  She has been tried on numerous medications in the past including gabapentin, Lyrica, Valtrex, Celebrex, ibuprofen which have all provided minimal benefit however steroids have been most helpful.  Today she rates her pain as an 8/10 and describes a constant burning sensation the left side of her face in addition to radiation into her bilateral lower extremities, her right shoulder, her left upper extremity occasionally as a pins and needle sensation.  She does report having numbness and weakness in her left lower extremity.  She has been physical therapy which she completed in February of 2019 however this caused most of her low back and leg symptoms to worsen in addition to her post herpetic neuralgia to worsen.  She denies having bowel bladder difficulties.  Her symptoms are worse with activity such as walking in her somewhat improved with rest however she had finds it difficult to get into a comfortable position. She has been using topical lidocaine patches and applying to the left side of her face which does provide significant benefit.  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    Previous Therapy:  Medications:  Celebrex, ibuprofen, gabapentin, Lyrica, Valtrex, steroids  Injections: bilateral L3/4 TF XANDER on 8/4/19 with 75% pain relief, bilateral L4/5 transforaminal epidural steroid injections with 80% symptomatic pain relief, bilateral L3-5 lumbar medial branch blocks with 100% symptomatic pain relief  Surgeries: None  Physical Therapy: Completed in the Past, currently participating    Past Surgical History:   Procedure Laterality Date    BRAIN SURGERY      CARDIAC CATHETERIZATION      CORONARY ANGIOPLASTY      EPIDURAL STEROID INJECTION INTO LUMBAR SPINE Bilateral 11/12/2019    Procedure: TF XANDER L4/5;  Surgeon: Desean Dias MD;  Location: [facility];  Service: Pain Management;  Laterality: Bilateral;    HYSTERECTOMY      INJECTION OF ANESTHETIC AGENT AROUND MEDIAL BRANCH NERVES INNERVATING LUMBAR FACET JOINT Bilateral 1/31/2020    Procedure: Bilateral L3-5 MBB;  Surgeon: Desean Dias MD;  Location: [facility];  Service: Pain Management;  Laterality: Bilateral;    TRANSFORAMINAL EPIDURAL INJECTION OF STEROID Bilateral 7/23/2019    Procedure: Bilateral L3/4 Transforaminal Epidural Steroid Injection;  Surgeon: Desean Dias MD;  Location: [facility];  Service: Pain Management;  Laterality: Bilateral;       Imaging / Labs / Studies (reviewed on 2/12/2020):    Results for orders placed during the hospital encounter of 08/04/19   CT Lumbar Spine Without Contrast    Narrative FINDINGS:  Wedge compression deformity of T12 consistent with an old fracture.  No bowing of the posterior cortex.Gas formation within the disc at the L2-3 L3-4 L4-5 and L5-S1 levels.Bilateral facet arthropathy and neural foraminal narrowing seen most significantly at the L3-4 L4-5 and L5-S1 levels with possible bilateral L3-L4 and L5 nerve  root impingement in the neural foramina.  13 degree lumbar scoliotic curvature convex to the left side.    Impression Mild lumbar scoliotic curvature convex to the left side with multilevel disc space narrowing seen most significantly at the L3-4 L4-5 and L5-S1 levels.  Bony neural foraminal narrowing with possible nerve root impingement seen most significantly at the L3-L4 level on the right side with possible right-sided L3 nerve root impingement and on the left side at the L4-5 and L5-S1 levels with possible left-sided L4 and L5 nerve root impingement.  Old compression fracture of the T12 vertebral body.  Please see the prior myelogram dated 07/12/2019 showing almost complete block at the L3-L4 level.  All CT scans at this facility are performed  using dose modulation techniques as appropriate to performed exam including the following:  automated exposure control; adjustment of mA and/or kV according to the patients size (this includes techniques or standardized protocols for targeted exams where dose is matched to indication/reason for exam: i.e. extremities or head);  iterative reconstruction technique.     Results for orders placed during the hospital encounter of 08/04/19   CT Thoracic Spine Without Contrast    Narrative COMPARISON:  None  FINDINGS:  Normal vertebral alignment.  Multilevel degenerative changes of the vertebral endplates seen most significantly at the T7-T8-T8-T9 T9-T10 T10-T11 and the T11-T12 vertebra.  No evidence of an acute compression fracture can be seen.  Cannot exclude an old mild wedge compression deformity of the T12 vertebral body.  Multilevel facet arthropathy.  Multilevel disc space narrowings between T4 and T10. Atherosclerotic calcifications of the abdominal aorta without aneurysmal dilatation.  Multiple subpleural blebs present in the adjacent lungs.    Impression Multilevel degenerative changes as described above.  Mild wedge deformity of the T12 vertebral body consistent with  an old compression fracture.  No acute fracture identified.  The patient has had a prior myelogram dated 07/12/2019 showing multiple impingement on the ventral thecal sac secondary to disc disease.  T12 vertebra is similar on the prior study.  All CT scans at this facility are performed  using dose modulation techniques as appropriate to performed exam including the following:  automated exposure control; adjustment of mA and/or kV according to the patients size (this includes techniques or standardized protocols for targeted exams where dose is matched to indication/reason for exam: i.e. extremities or head);  iterative reconstruction technique.       Results for orders placed during the hospital encounter of 07/12/19   X-Ray Hips Bilateral 2 View Incl AP Pelvis    Narrative FINDINGS:  Degenerative changes of the lumbosacral spine and sacroiliac joints.  Bones are demineralized.  Bilateral total hip arthroplasty changes are noted.  No periprosthetic fracture or hardware complication.     Results for orders placed during the hospital encounter of 08/04/19   X-Ray Hip 2 View Left    Narrative COMPARISON:  07/31/2019  FINDINGS:  Bilateral hip replacements are intact in good alignment.  Left hip appears free of any acute fracture or dislocation.     Results for orders placed during the hospital encounter of 07/31/19   CT Cervical Spine Without Contrast    Narrative FINDINGS:  Osteopenia.  Grade 2 degenerative spondylolisthesis at C3-C4.  Grade 1 degenerative spondylolisthesis at C4-C5.  Vertebral body height is normal.  No acute fractures. No prevertebral soft tissue swelling. Atlanto-occipital articulation is normal.  Congenitally patulous spinal canal.  No significant spinal stenosis.  Moderate left foraminal stenosis at C3-C4.    Impression No acute findings.  Degenerative spondylolisthesis at C3-C4 and C4-C5.  All CT scans at this facility use dose modulation, iterative reconstruction, and/or weight base dosing  when appropriate to reduce radiation dose to as low as reasonably achievable.     Results for orders placed during the hospital encounter of 07/12/19   Fl Myelogram 2 Or More Regions    Narrative TECHNIQUE:  After obtaining informed consent from the patient explain the risks, benefits and alternatives to the procedure the patient was placed on the fluoroscopic table in the prone position. The risks included although not limited to bleeding, infection, contrast/drug reaction, nerve root/cord injury,  CSF leak/spinal headache, seizures and herniation. The L2/3 level within isolated under fluoroscopy. The overlying skin was prepped and draped routine sterile fashion. Approximately 1 cc of a 1% lidocaine was used for local anesthesia. Using a 22-gauge spinal needle access was obtained to the thecal sac be a midline translaminar approach. Following removal of stylet spontaneous visualization of clear cerebrospinal fluid was identified. Following this contrast system was attached and approximately 10 cc of a Omnipaque 300 contrast material was infused under fluoroscopy. Patient tolerated procedure well. No immediate postprocedure complication. The stylet was then reinserted and the  spinal needle system was removed in full. Adhesive bandage was placed over the puncture site. Spot overhead fluoroscopic images in the and lateral projection with additional lateral flexion and extension views.  Total fluoro time was 2.1 minutes and 1 fluoroscopic image was obtained.  COMPARISON:  06/04/2019  FINDINGS:  No complications.  Limited images due to limited mobility.    Impression Successful fluoroscopic myelogram. See dedicated CT myelogram thoracic and lumbar spine.     Results for orders placed during the hospital encounter of 07/12/19   CT Myelography Thoracic Lumbar Spine    Narrative COMPARISON:  None  FINDINGS:  Thoracic spine:  There is no acute fracture of the thoracic spine.  There is an old anterior wedge compression  fracture of the T12 vertebral body.  There is a Schmorl's node seen within the superior aspect of the T8 vertebral body.  There is a slight levoscoliosis of the thoracic spine centered at T3.  T1-T2: Unremarkable.  T2-T3: Unremarkable.  T3-T4: There is a disc bulge with left ventral surface thecal sac effacement but no impingement upon the cord.  T4-T5: Unremarkable.  T5-T6: Unremarkable.  T6-T7: There is a central focal disc protrusion which causes right ventral surface cord impingement.  The disc protrusion measures 7 mm in AP dimension, 14 mm in craniocaudal dimension, and 10 mm in transverse dimension.  T7-T8: Unremarkable.  T8-T9: There is a central disc extrusion with the disc extrusion extending inferiorly from the level of the disc and measuring 19 mm in craniocaudal dimension, 8 mm in transverse dimension, and 5 mm in anterior-posterior dimension.  This disc extrusion slightly impinges upon the ventral surface of the cord.  T9-T10: Unremarkable.  T10-T11: There is a disc extrusion in the central to right central location which is extending superiorly from the disc level and measuring 18 mm in craniocaudal dimension, 5 mm in anterior-posterior dimension, and 8 mm in transverse dimension.  This disc extrusion impinges upon the right ventral surface of the cord.  T11-T12: Minimal disc bulge with ventral surface thecal sac effacement but no cord impingement.  T12-L1: Right central disc protrusion measuring 10 mm in craniocaudal dimension, 8 mm in transverse dimension, and 5 mm in anterior-posterior dimension which causes slight right ventral surface cord remodeling.    Lumbar spine:  No fracture of the lumbar spine.  No paraspinal mass.  The conus medullaris has a normal morphology and terminates at the L1-L2 level.  L1-L2: Mild disc bulge with mild ventral surface thecal sac effacement.  Patent neural foramina.  L2-L3: Degenerative disc disease.  Disc bulge with severe central canal stenosis with the AP  diameter at the point of maximal stenosis measuring 7 mm.  Mild bilateral neural foraminal narrowing.  Bilateral facet joint osteoarthritis.  L3-L4: Severe degenerative disc disease.  There is critical central canal stenosis at this level with no contrast-enhanced CSF identified at the disc level.  There is severe crowding of the nerve roots in the compressed thecal sac at this level.  The disc protrusion extends into the right neural foramen causing severe right neural foraminal stenosis and probable impingement upon the exiting right L3 nerve root.  L4-5: Severe degenerative disc disease.  Disc bulge along with ligamentum flavum thickening which causes severe central canal stenosis with the AP diameter of the thecal sac measuring 8 mm the point of maximal stenosis.  The disc protrusion extends into the bilateral neural foramen causing severe right neural foraminal stenosis and moderate left neural foraminal stenosis.  L5-S1: Degenerative disc disease.  Disc bulge with mild central canal narrowing.  Mild to moderate left neural foraminal stenosis.    Impression 1. There is critical central canal stenosis at L3-L4 due to a disc bulge and ligamentum flavum thickening.  2. Severe right L3-L4 neural foraminal stenosis with probable impingement upon the exiting right L3 nerve root.  3. Severe central canal stenosis at L2-L3 and L4-L5 due to disc bulges.  4. Severe right L4-5 neural foraminal stenosis.  5. Multiple disc protrusions in the thoracic spine as detailed each level with some ventral surface cord impingement seen at the T6-T7, T8-T9, and T10-T11 levels.  All CT scans at this facility are performed  using dose modulation techniques as appropriate to performed exam including the following:  automated exposure control; adjustment of mA and/or kV according to the patients size (this includes techniques or standardized protocols for targeted exams where dose is matched to indication/reason for exam: i.e. extremities  "or head);  iterative reconstruction technique.         Review of Systems:  Review of Systems   Constitutional: Negative for fever.   HENT:        Left-sided rash of the face   Eyes: Negative for blurred vision.   Respiratory: Negative for cough and wheezing.    Cardiovascular: Negative for chest pain and orthopnea.   Gastrointestinal: Negative for constipation, diarrhea, nausea and vomiting.   Genitourinary: Negative for dysuria.   Musculoskeletal: Positive for back pain.   Skin: Negative for itching and rash.   Neurological: Positive for weakness.   Endo/Heme/Allergies: Does not bruise/bleed easily.       Physical Exam:  Vitals:  /77 (BP Location: Right arm, Patient Position: Sitting)   Pulse (!) 118   Ht 5' 4" (1.626 m)   Wt 56.7 kg (125 lb)   LMP  (LMP Unknown)   BMI 21.46 kg/m²   (reviewed on 2/12/2020)    General: alert and oriented, in no apparent distress.  Gait: in wheelchair.  Skin: no rashes, no discoloration, no obvious lesions  HEENT: normocephalic, atraumatic. Pupils equal and round.  Cardiovascular: no significant peripheral edema present.  Respiratory: without use of accessory muscles of respiration.    Musculoskeletal - Lumbar Spine:  -severe tenderness to palpation over lower lumbar vertebral body midline and facets bilaterally and; severe tenderness to palpation over right lower thoracic spine and flank    Neuro - Lower Extremities:  - Extremity Reflexes: Brisk and symmetric throughout  - Sensory: Sensation to light touch intact bilaterally      Psych:  Mood and affect is appropriate    Assessment  1. 74 y.o. year old patient with PMH of   Past Medical History:   Diagnosis Date    Abnormal ECG 8/5/2019    Aneurysm     Anticoagulant long-term use     Arthritis     CAD in native artery 8/5/2019    COPD (chronic obstructive pulmonary disease)     Diabetes mellitus     Glaucoma     Hypertension     Seizures     Shingles 05/27/2017    Stroke       presenting with pain located left " side of her face, left lower extremity, lumbar spine. Diagnoses include:    ICD-10-CM ICD-9-CM   1. Thoracic spine pain M54.6 724.1   2. Thoracic radiculopathy M54.14 724.4   3. Lumbar spondylosis M47.816 721.3      2. Pain Generators / Etiology: Lumbar Radiculopathy and Lumbar Spondylosis, post herpetic neuralgia  3. Failed Meds (E- Effective, NE- Not Effective):  Celebrex-minimally effective, ibuprofen-minimally effective, gabapentin-minimally effective, Lyrica-minimally effective, Valtrex-minimally effective, steroid-effective  4. Physical Therapy - Completed in the Past  5. Psychological comorbidities - None  6. Anticoagulants / Antiplatelets: Plavix      Plan:  1. Interventional:   - schedule for right T12/L1 transforaminal epidural steroid injection; will then plan for right L3-5 lumbar radiofrequency ablation in the will schedule for left L3-5 lumbar radiofrequency ablation     2. Pharmacologic: Continue Cymbalta 30 mg once daily PRN.  Continue tramadol as prescribed    3. Rehabilitative:  Will hold off on exercise currently; will restart physical therapy after imaging is complete    4. Diagnostic:  None; reviewed CT scan thoracic and lumbar spine patient today in clinic    5. Follow up:  Follow up in clinic 4 weeks after the injection    - This condition does not require this patient to take time off of work.   - I discussed the risks, benefits, and alternatives to potential treatment options. All questions and concerns were fully addressed today in clinic. Dr. Dias was consulted regarding the patient plan and agrees.    SCOUT Dias MD  Interventional Pain Medicine  [facility] [place]

## 2020-02-12 NOTE — H&P (VIEW-ONLY)
Chief Pain Complaint:  Low back pain  Right shoulder pain    Interval HPI:  Ms. Felix returns to clinic for follow-up.  She underwent bilateral lumbar medial branch blocks on January 31, 2020 and reports 100% symptomatic pain relief for approximately 1 week and his symptoms slowly began to return.  She continues to have some right-sided lower thoracic posterior pain which was not completely dressed with the medial branch blocks.  Today she rates her pain as an 8/10 which is located primarily in the axial lumbar spine and the lower right posterior thoracic region.  She denies having numbness weakness in the legs but does continue to have a constant aching and throbbing sensation in the low back.    Interval HPI:   Ms. Felix returns to clinic for follow-up.  She reports that she underwent bilateral L4/5 transforaminal epidural steroid injections in November 2019 reports ongoing 80% symptomatic pain relief.  She has also been participating physical therapy which she has found to be very helpful however 4 days ago she started to do some leg raise is in her bed and she felt severe pain in her low back which has not subsided.  She denies having any radiation except for around her flank into her anterior abdomen.  She feels like the pain sometimes comes straight through from her back to her abdomen.  Today she rates her pain as a 10/10 describes it as a constant aching and sharp pain. She has been using a heating pad which is minimally helpful and she has been holding off on physical therapy at this time. She denies having bowel bladder difficulty.    Interval History: Patient was seen on 8/4/19. At that time she underwent bilateral L3/4 TF XANDER.  The patient reports that she is/was better following the procedure.  she reports 75% pain relief.  She had a fall, then pain increased. She is currently living at [facility]. She is doing at home therapy there, which is helping. She is in wheelchair now but hopes to transition  to walker soon.  After the fall, she was not able to get out of the bed.     Initial History of Present Illness:   This patient is a 74 y.o. female who presents today complaining of the above noted pain/s. The patient describes the pain as follows.  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  She reports approximately 2 years ago she had to sudden deaths in the family which caused very stressful time for her and resulted in shingles rash in the left V1 distribution however she reports having excruciating pain in the left V1 and V2 distributions.  She denies having difficulty with eating food and brushing her teeth on the left side of her face however the left lateral side of her nose gets her excruciating pain.  She has been tried on numerous medications in the past including gabapentin, Lyrica, Valtrex, Celebrex, ibuprofen which have all provided minimal benefit however steroids have been most helpful.  Today she rates her pain as an 8/10 and describes a constant burning sensation the left side of her face in addition to radiation into her bilateral lower extremities, her right shoulder, her left upper extremity occasionally as a pins and needle sensation.  She does report having numbness and weakness in her left lower extremity.  She has been physical therapy which she completed in February of 2019 however this caused most of her low back and leg symptoms to worsen in addition to her post herpetic neuralgia to worsen.  She denies having bowel bladder difficulties.  Her symptoms are worse with activity such as walking in her somewhat improved with rest however she had finds it difficult to get into a comfortable position. She has been using topical lidocaine patches and applying to the left side of her face which does provide significant benefit.  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    Previous Therapy:  Medications:  Celebrex, ibuprofen, gabapentin, Lyrica, Valtrex, steroids  Injections: bilateral L3/4 TF XANDER on 8/4/19 with 75% pain relief, bilateral L4/5 transforaminal epidural steroid injections with 80% symptomatic pain relief, bilateral L3-5 lumbar medial branch blocks with 100% symptomatic pain relief  Surgeries: None  Physical Therapy: Completed in the Past, currently participating    Past Surgical History:   Procedure Laterality Date    BRAIN SURGERY      CARDIAC CATHETERIZATION      CORONARY ANGIOPLASTY      EPIDURAL STEROID INJECTION INTO LUMBAR SPINE Bilateral 11/12/2019    Procedure: TF XANDER L4/5;  Surgeon: Desean Dias MD;  Location: [facility];  Service: Pain Management;  Laterality: Bilateral;    HYSTERECTOMY      INJECTION OF ANESTHETIC AGENT AROUND MEDIAL BRANCH NERVES INNERVATING LUMBAR FACET JOINT Bilateral 1/31/2020    Procedure: Bilateral L3-5 MBB;  Surgeon: Desean Dias MD;  Location: [facility];  Service: Pain Management;  Laterality: Bilateral;    TRANSFORAMINAL EPIDURAL INJECTION OF STEROID Bilateral 7/23/2019    Procedure: Bilateral L3/4 Transforaminal Epidural Steroid Injection;  Surgeon: Desean Dias MD;  Location: [facility];  Service: Pain Management;  Laterality: Bilateral;       Imaging / Labs / Studies (reviewed on 2/12/2020):    Results for orders placed during the hospital encounter of 08/04/19   CT Lumbar Spine Without Contrast    Narrative FINDINGS:  Wedge compression deformity of T12 consistent with an old fracture.  No bowing of the posterior cortex.Gas formation within the disc at the L2-3 L3-4 L4-5 and L5-S1 levels.Bilateral facet arthropathy and neural foraminal narrowing seen most significantly at the L3-4 L4-5 and L5-S1 levels with possible bilateral L3-L4 and L5 nerve  root impingement in the neural foramina.  13 degree lumbar scoliotic curvature convex to the left side.    Impression Mild lumbar scoliotic curvature convex to the left side with multilevel disc space narrowing seen most significantly at the L3-4 L4-5 and L5-S1 levels.  Bony neural foraminal narrowing with possible nerve root impingement seen most significantly at the L3-L4 level on the right side with possible right-sided L3 nerve root impingement and on the left side at the L4-5 and L5-S1 levels with possible left-sided L4 and L5 nerve root impingement.  Old compression fracture of the T12 vertebral body.  Please see the prior myelogram dated 07/12/2019 showing almost complete block at the L3-L4 level.  All CT scans at this facility are performed  using dose modulation techniques as appropriate to performed exam including the following:  automated exposure control; adjustment of mA and/or kV according to the patients size (this includes techniques or standardized protocols for targeted exams where dose is matched to indication/reason for exam: i.e. extremities or head);  iterative reconstruction technique.     Results for orders placed during the hospital encounter of 08/04/19   CT Thoracic Spine Without Contrast    Narrative COMPARISON:  None  FINDINGS:  Normal vertebral alignment.  Multilevel degenerative changes of the vertebral endplates seen most significantly at the T7-T8-T8-T9 T9-T10 T10-T11 and the T11-T12 vertebra.  No evidence of an acute compression fracture can be seen.  Cannot exclude an old mild wedge compression deformity of the T12 vertebral body.  Multilevel facet arthropathy.  Multilevel disc space narrowings between T4 and T10. Atherosclerotic calcifications of the abdominal aorta without aneurysmal dilatation.  Multiple subpleural blebs present in the adjacent lungs.    Impression Multilevel degenerative changes as described above.  Mild wedge deformity of the T12 vertebral body consistent with  an old compression fracture.  No acute fracture identified.  The patient has had a prior myelogram dated 07/12/2019 showing multiple impingement on the ventral thecal sac secondary to disc disease.  T12 vertebra is similar on the prior study.  All CT scans at this facility are performed  using dose modulation techniques as appropriate to performed exam including the following:  automated exposure control; adjustment of mA and/or kV according to the patients size (this includes techniques or standardized protocols for targeted exams where dose is matched to indication/reason for exam: i.e. extremities or head);  iterative reconstruction technique.       Results for orders placed during the hospital encounter of 07/12/19   X-Ray Hips Bilateral 2 View Incl AP Pelvis    Narrative FINDINGS:  Degenerative changes of the lumbosacral spine and sacroiliac joints.  Bones are demineralized.  Bilateral total hip arthroplasty changes are noted.  No periprosthetic fracture or hardware complication.     Results for orders placed during the hospital encounter of 08/04/19   X-Ray Hip 2 View Left    Narrative COMPARISON:  07/31/2019  FINDINGS:  Bilateral hip replacements are intact in good alignment.  Left hip appears free of any acute fracture or dislocation.     Results for orders placed during the hospital encounter of 07/31/19   CT Cervical Spine Without Contrast    Narrative FINDINGS:  Osteopenia.  Grade 2 degenerative spondylolisthesis at C3-C4.  Grade 1 degenerative spondylolisthesis at C4-C5.  Vertebral body height is normal.  No acute fractures. No prevertebral soft tissue swelling. Atlanto-occipital articulation is normal.  Congenitally patulous spinal canal.  No significant spinal stenosis.  Moderate left foraminal stenosis at C3-C4.    Impression No acute findings.  Degenerative spondylolisthesis at C3-C4 and C4-C5.  All CT scans at this facility use dose modulation, iterative reconstruction, and/or weight base dosing  when appropriate to reduce radiation dose to as low as reasonably achievable.     Results for orders placed during the hospital encounter of 07/12/19   Fl Myelogram 2 Or More Regions    Narrative TECHNIQUE:  After obtaining informed consent from the patient explain the risks, benefits and alternatives to the procedure the patient was placed on the fluoroscopic table in the prone position. The risks included although not limited to bleeding, infection, contrast/drug reaction, nerve root/cord injury,  CSF leak/spinal headache, seizures and herniation. The L2/3 level within isolated under fluoroscopy. The overlying skin was prepped and draped routine sterile fashion. Approximately 1 cc of a 1% lidocaine was used for local anesthesia. Using a 22-gauge spinal needle access was obtained to the thecal sac be a midline translaminar approach. Following removal of stylet spontaneous visualization of clear cerebrospinal fluid was identified. Following this contrast system was attached and approximately 10 cc of a Omnipaque 300 contrast material was infused under fluoroscopy. Patient tolerated procedure well. No immediate postprocedure complication. The stylet was then reinserted and the  spinal needle system was removed in full. Adhesive bandage was placed over the puncture site. Spot overhead fluoroscopic images in the and lateral projection with additional lateral flexion and extension views.  Total fluoro time was 2.1 minutes and 1 fluoroscopic image was obtained.  COMPARISON:  06/04/2019  FINDINGS:  No complications.  Limited images due to limited mobility.    Impression Successful fluoroscopic myelogram. See dedicated CT myelogram thoracic and lumbar spine.     Results for orders placed during the hospital encounter of 07/12/19   CT Myelography Thoracic Lumbar Spine    Narrative COMPARISON:  None  FINDINGS:  Thoracic spine:  There is no acute fracture of the thoracic spine.  There is an old anterior wedge compression  fracture of the T12 vertebral body.  There is a Schmorl's node seen within the superior aspect of the T8 vertebral body.  There is a slight levoscoliosis of the thoracic spine centered at T3.  T1-T2: Unremarkable.  T2-T3: Unremarkable.  T3-T4: There is a disc bulge with left ventral surface thecal sac effacement but no impingement upon the cord.  T4-T5: Unremarkable.  T5-T6: Unremarkable.  T6-T7: There is a central focal disc protrusion which causes right ventral surface cord impingement.  The disc protrusion measures 7 mm in AP dimension, 14 mm in craniocaudal dimension, and 10 mm in transverse dimension.  T7-T8: Unremarkable.  T8-T9: There is a central disc extrusion with the disc extrusion extending inferiorly from the level of the disc and measuring 19 mm in craniocaudal dimension, 8 mm in transverse dimension, and 5 mm in anterior-posterior dimension.  This disc extrusion slightly impinges upon the ventral surface of the cord.  T9-T10: Unremarkable.  T10-T11: There is a disc extrusion in the central to right central location which is extending superiorly from the disc level and measuring 18 mm in craniocaudal dimension, 5 mm in anterior-posterior dimension, and 8 mm in transverse dimension.  This disc extrusion impinges upon the right ventral surface of the cord.  T11-T12: Minimal disc bulge with ventral surface thecal sac effacement but no cord impingement.  T12-L1: Right central disc protrusion measuring 10 mm in craniocaudal dimension, 8 mm in transverse dimension, and 5 mm in anterior-posterior dimension which causes slight right ventral surface cord remodeling.    Lumbar spine:  No fracture of the lumbar spine.  No paraspinal mass.  The conus medullaris has a normal morphology and terminates at the L1-L2 level.  L1-L2: Mild disc bulge with mild ventral surface thecal sac effacement.  Patent neural foramina.  L2-L3: Degenerative disc disease.  Disc bulge with severe central canal stenosis with the AP  diameter at the point of maximal stenosis measuring 7 mm.  Mild bilateral neural foraminal narrowing.  Bilateral facet joint osteoarthritis.  L3-L4: Severe degenerative disc disease.  There is critical central canal stenosis at this level with no contrast-enhanced CSF identified at the disc level.  There is severe crowding of the nerve roots in the compressed thecal sac at this level.  The disc protrusion extends into the right neural foramen causing severe right neural foraminal stenosis and probable impingement upon the exiting right L3 nerve root.  L4-5: Severe degenerative disc disease.  Disc bulge along with ligamentum flavum thickening which causes severe central canal stenosis with the AP diameter of the thecal sac measuring 8 mm the point of maximal stenosis.  The disc protrusion extends into the bilateral neural foramen causing severe right neural foraminal stenosis and moderate left neural foraminal stenosis.  L5-S1: Degenerative disc disease.  Disc bulge with mild central canal narrowing.  Mild to moderate left neural foraminal stenosis.    Impression 1. There is critical central canal stenosis at L3-L4 due to a disc bulge and ligamentum flavum thickening.  2. Severe right L3-L4 neural foraminal stenosis with probable impingement upon the exiting right L3 nerve root.  3. Severe central canal stenosis at L2-L3 and L4-L5 due to disc bulges.  4. Severe right L4-5 neural foraminal stenosis.  5. Multiple disc protrusions in the thoracic spine as detailed each level with some ventral surface cord impingement seen at the T6-T7, T8-T9, and T10-T11 levels.  All CT scans at this facility are performed  using dose modulation techniques as appropriate to performed exam including the following:  automated exposure control; adjustment of mA and/or kV according to the patients size (this includes techniques or standardized protocols for targeted exams where dose is matched to indication/reason for exam: i.e. extremities  "or head);  iterative reconstruction technique.         Review of Systems:  Review of Systems   Constitutional: Negative for fever.   HENT:        Left-sided rash of the face   Eyes: Negative for blurred vision.   Respiratory: Negative for cough and wheezing.    Cardiovascular: Negative for chest pain and orthopnea.   Gastrointestinal: Negative for constipation, diarrhea, nausea and vomiting.   Genitourinary: Negative for dysuria.   Musculoskeletal: Positive for back pain.   Skin: Negative for itching and rash.   Neurological: Positive for weakness.   Endo/Heme/Allergies: Does not bruise/bleed easily.       Physical Exam:  Vitals:  /77 (BP Location: Right arm, Patient Position: Sitting)   Pulse (!) 118   Ht 5' 4" (1.626 m)   Wt 56.7 kg (125 lb)   LMP  (LMP Unknown)   BMI 21.46 kg/m²   (reviewed on 2/12/2020)    General: alert and oriented, in no apparent distress.  Gait: in wheelchair.  Skin: no rashes, no discoloration, no obvious lesions  HEENT: normocephalic, atraumatic. Pupils equal and round.  Cardiovascular: no significant peripheral edema present.  Respiratory: without use of accessory muscles of respiration.    Musculoskeletal - Lumbar Spine:  -severe tenderness to palpation over lower lumbar vertebral body midline and facets bilaterally and; severe tenderness to palpation over right lower thoracic spine and flank    Neuro - Lower Extremities:  - Extremity Reflexes: Brisk and symmetric throughout  - Sensory: Sensation to light touch intact bilaterally      Psych:  Mood and affect is appropriate    Assessment  1. 74 y.o. year old patient with PMH of   Past Medical History:   Diagnosis Date    Abnormal ECG 8/5/2019    Aneurysm     Anticoagulant long-term use     Arthritis     CAD in native artery 8/5/2019    COPD (chronic obstructive pulmonary disease)     Diabetes mellitus     Glaucoma     Hypertension     Seizures     Shingles 05/27/2017    Stroke       presenting with pain located left " side of her face, left lower extremity, lumbar spine. Diagnoses include:    ICD-10-CM ICD-9-CM   1. Thoracic spine pain M54.6 724.1   2. Thoracic radiculopathy M54.14 724.4   3. Lumbar spondylosis M47.816 721.3      2. Pain Generators / Etiology: Lumbar Radiculopathy and Lumbar Spondylosis, post herpetic neuralgia  3. Failed Meds (E- Effective, NE- Not Effective):  Celebrex-minimally effective, ibuprofen-minimally effective, gabapentin-minimally effective, Lyrica-minimally effective, Valtrex-minimally effective, steroid-effective  4. Physical Therapy - Completed in the Past  5. Psychological comorbidities - None  6. Anticoagulants / Antiplatelets: Plavix      Plan:  1. Interventional:   - schedule for right T12/L1 transforaminal epidural steroid injection; will then plan for right L3-5 lumbar radiofrequency ablation in the will schedule for left L3-5 lumbar radiofrequency ablation     2. Pharmacologic: Continue Cymbalta 30 mg once daily PRN.  Continue tramadol as prescribed    3. Rehabilitative:  Will hold off on exercise currently; will restart physical therapy after imaging is complete    4. Diagnostic:  None; reviewed CT scan thoracic and lumbar spine patient today in clinic    5. Follow up:  Follow up in clinic 4 weeks after the injection    - This condition does not require this patient to take time off of work.   - I discussed the risks, benefits, and alternatives to potential treatment options. All questions and concerns were fully addressed today in clinic. Dr. Dias was consulted regarding the patient plan and agrees.    SCOUT Dias MD  Interventional Pain Medicine  [facility] [place]

## 2020-02-12 NOTE — DISCHARGE INSTRUCTIONS
[facility]  [address] [address]  [address]  [phone] phone     Hours of Operation: Monday- Friday 8:00am- 5:00pm  After hours phone  [phone]    EVAN [address] Dr., Dr., Dr., PA      Please call with any concerns regarding your appointment today.   \[phone]\

## 2020-02-12 NOTE — NURSING
Prolia 60 mg q 6 months  Last dose given-patient reports and Dr. Gaona reports that 1 dose of Prolia was given - unsure of the date.     Any invasive dental procedures in past 3 months or upcoming 3 months: Denied    Last Rheumatology provider visit- Seen by Dr. Narayan on 1/28/20520    Recent labs? 2/12/2020;  CKD pt needing repeat labs in 10 days- n/a   Lab Results   Component Value Date    CALCIUM 10.3 02/12/2020     Lab Results   Component Value Date    CREATININE 1.0 02/12/2020     Lab Results   Component Value Date    ESTGFRAFRICA >60 02/12/2020     Lab Results   Component Value Date    EGFRNONAA 55 (A) 02/12/2020     Lab Results   Component Value Date    QWHVAKFX49TH 25 (L) 06/04/2019         Prolia 60 mg/ml administered SQ to Right lower abdominal quadrant. Tolerated without any complaints. No adverse reaction noted or reported after 15 minutes of administration. No redness, swelling, or drainage noted to site. Pt instructed on signs and symptoms of reaction to report. Verbalizes understanding.     Follow up appointments:  Given to patient.

## 2020-02-13 NOTE — TELEPHONE ENCOUNTER
"Denied on 2/3/20 as per letter from payor.    See letter under "Media" tab.    Forwarded to Dr. Singleton to notify.  "

## 2020-02-14 DIAGNOSIS — E55.9 VITAMIN D INSUFFICIENCY: Primary | ICD-10-CM

## 2020-02-14 RX ORDER — ASPIRIN 325 MG
50000 TABLET, DELAYED RELEASE (ENTERIC COATED) ORAL
Qty: 12 CAPSULE | Refills: 0 | Status: SHIPPED | OUTPATIENT
Start: 2020-02-14 | End: 2020-06-12 | Stop reason: SDUPTHER

## 2020-02-17 ENCOUNTER — TELEPHONE (OUTPATIENT)
Dept: RHEUMATOLOGY | Facility: CLINIC | Age: 75
End: 2020-02-17

## 2020-02-17 NOTE — TELEPHONE ENCOUNTER
Called pt to advise of Dr. Gaona's note, pt's brother Rm Ojeda stated she is unavailable & that he could take the msg, he  is her caretaker. Advised of the information, he verbalized understanding.

## 2020-02-17 NOTE — TELEPHONE ENCOUNTER
----- Message from Iraida Gaona MD sent at 2/14/2020  1:34 PM CST -----  Vitamin-D level still low  Take 75055 units once a week for 3 months, then can stay on 1000 units a day  New prescription given  Start taking it at least 2 weeks before Prolia given

## 2020-02-18 ENCOUNTER — LAB VISIT (OUTPATIENT)
Dept: LAB | Facility: HOSPITAL | Age: 75
End: 2020-02-18
Attending: INTERNAL MEDICINE
Payer: MEDICARE

## 2020-02-18 DIAGNOSIS — M81.0 AGE-RELATED OSTEOPOROSIS WITHOUT CURRENT PATHOLOGICAL FRACTURE: ICD-10-CM

## 2020-02-18 LAB
ALBUMIN SERPL BCP-MCNC: 3.2 G/DL (ref 3.5–5.2)
ALP SERPL-CCNC: 81 U/L (ref 55–135)
ALT SERPL W/O P-5'-P-CCNC: 11 U/L (ref 10–44)
ANION GAP SERPL CALC-SCNC: 8 MMOL/L (ref 8–16)
AST SERPL-CCNC: 16 U/L (ref 10–40)
BILIRUB SERPL-MCNC: 0.2 MG/DL (ref 0.1–1)
BUN SERPL-MCNC: 10 MG/DL (ref 8–23)
CALCIUM SERPL-MCNC: 8.8 MG/DL (ref 8.7–10.5)
CHLORIDE SERPL-SCNC: 110 MMOL/L (ref 95–110)
CO2 SERPL-SCNC: 21 MMOL/L (ref 23–29)
CREAT SERPL-MCNC: 0.8 MG/DL (ref 0.5–1.4)
EST. GFR  (AFRICAN AMERICAN): >60 ML/MIN/1.73 M^2
EST. GFR  (NON AFRICAN AMERICAN): >60 ML/MIN/1.73 M^2
GLUCOSE SERPL-MCNC: 96 MG/DL (ref 70–110)
POTASSIUM SERPL-SCNC: 3.6 MMOL/L (ref 3.5–5.1)
PROT SERPL-MCNC: 6.8 G/DL (ref 6–8.4)
SODIUM SERPL-SCNC: 139 MMOL/L (ref 136–145)

## 2020-02-18 PROCEDURE — 80053 COMPREHEN METABOLIC PANEL: CPT

## 2020-02-18 PROCEDURE — 36415 COLL VENOUS BLD VENIPUNCTURE: CPT

## 2020-02-18 NOTE — PRE-PROCEDURE INSTRUCTIONS
Spoke with patients brither/caregiver  Serjio smith      regarding procedure scheduled on 2/25/2020  Arrival time 0650  Has patient been sick with fever or on antibiotics within the last 7 days? no  Has patient received a vaccination within the last 7 days? no  Has the patient stopped all medications as directed? Yes, last dose of plavix and mobic on 2/18/2020  Does patient have a pacemaker and or defibrillator? no  Does the patient have a ride to and from procedure and someone reliable to remain with patient? Yes, brother serjio  Is the patient diabetic? yes  Does the patient have sleep apnea? Or use O2 at home? No no  Is the patient receiving sedation? Yes, light  Is the patient instructed to remain NPO beginning at midnight the night before their procedure? yes  Procedure location confirmed with patient? yes  Travel screening: negative

## 2020-02-21 ENCOUNTER — TELEPHONE (OUTPATIENT)
Dept: INTERNAL MEDICINE | Facility: CLINIC | Age: 75
End: 2020-02-21

## 2020-02-21 ENCOUNTER — OFFICE VISIT (OUTPATIENT)
Dept: INTERNAL MEDICINE | Facility: CLINIC | Age: 75
End: 2020-02-21
Payer: MEDICARE

## 2020-02-21 VITALS
BODY MASS INDEX: 22.35 KG/M2 | HEIGHT: 64 IN | TEMPERATURE: 99 F | OXYGEN SATURATION: 98 % | HEART RATE: 100 BPM | RESPIRATION RATE: 16 BRPM | SYSTOLIC BLOOD PRESSURE: 142 MMHG | WEIGHT: 130.94 LBS | DIASTOLIC BLOOD PRESSURE: 68 MMHG

## 2020-02-21 DIAGNOSIS — J41.1 MUCOPURULENT CHRONIC BRONCHITIS: Chronic | ICD-10-CM

## 2020-02-21 DIAGNOSIS — N18.30 CONTROLLED TYPE 2 DIABETES MELLITUS WITH STAGE 3 CHRONIC KIDNEY DISEASE, WITHOUT LONG-TERM CURRENT USE OF INSULIN: ICD-10-CM

## 2020-02-21 DIAGNOSIS — E11.22 CONTROLLED TYPE 2 DIABETES MELLITUS WITH STAGE 3 CHRONIC KIDNEY DISEASE, WITHOUT LONG-TERM CURRENT USE OF INSULIN: ICD-10-CM

## 2020-02-21 DIAGNOSIS — J11.1 INFLUENZA: Primary | ICD-10-CM

## 2020-02-21 PROCEDURE — 99999 PR PBB SHADOW E&M-EST. PATIENT-LVL III: ICD-10-PCS | Mod: PBBFAC,,, | Performed by: PEDIATRICS

## 2020-02-21 PROCEDURE — 96372 THER/PROPH/DIAG INJ SC/IM: CPT | Mod: PBBFAC

## 2020-02-21 PROCEDURE — 99999 PR PBB SHADOW E&M-EST. PATIENT-LVL III: CPT | Mod: PBBFAC,,, | Performed by: PEDIATRICS

## 2020-02-21 PROCEDURE — 99214 OFFICE O/P EST MOD 30 MIN: CPT | Mod: S$PBB,,, | Performed by: PEDIATRICS

## 2020-02-21 PROCEDURE — 99213 OFFICE O/P EST LOW 20 MIN: CPT | Mod: PBBFAC | Performed by: PEDIATRICS

## 2020-02-21 PROCEDURE — 99214 PR OFFICE/OUTPT VISIT, EST, LEVL IV, 30-39 MIN: ICD-10-PCS | Mod: S$PBB,,, | Performed by: PEDIATRICS

## 2020-02-21 RX ORDER — GUAIFENESIN 100 MG/5ML
200 SOLUTION ORAL
COMMUNITY
End: 2022-01-16 | Stop reason: ALTCHOICE

## 2020-02-21 RX ORDER — TRIAMCINOLONE ACETONIDE 40 MG/ML
40 INJECTION, SUSPENSION INTRA-ARTICULAR; INTRAMUSCULAR
Status: COMPLETED | OUTPATIENT
Start: 2020-02-21 | End: 2020-02-21

## 2020-02-21 RX ORDER — OSELTAMIVIR PHOSPHATE 75 MG/1
75 CAPSULE ORAL 2 TIMES DAILY
Qty: 10 CAPSULE | Refills: 0 | Status: SHIPPED | OUTPATIENT
Start: 2020-02-21 | End: 2020-02-26

## 2020-02-21 RX ADMIN — TRIAMCINOLONE ACETONIDE 40 MG: 400 INJECTION, SUSPENSION INTRA-ARTICULAR; INTRAMUSCULAR at 10:02

## 2020-02-21 NOTE — TELEPHONE ENCOUNTER
S/w pt's brother Rm stating pt c/o hacking cough and nasal congestion, wants to be eval for Pneumonia, denies SOB or sore throat. Brother has appt today w/ Dr. Chaudhari, sched pt appt same time for brother to bring pt in as well, brother voiced understanding.

## 2020-02-21 NOTE — PROGRESS NOTES
Subjective:       Patient ID: Shireen Felix is a 74 y.o. female.    Chief Complaint: Cough; Generalized Body Aches; Sore Throat; and Nasal Congestion    Brother and family has flu. She had flu vaccine. Yesterday myalgias, low grade fever, cough, wheeze, nasal congestion. Taking Breo 200 daily, just started duoneb. Not checking BS but no hyperglycemic symptoms.     Review of Systems   Constitutional: Negative for fever and unexpected weight change.   HENT: Positive for congestion, postnasal drip, rhinorrhea and sore throat. Negative for trouble swallowing.    Eyes: Negative for discharge and redness.   Respiratory: Positive for cough and shortness of breath. Negative for wheezing.    Cardiovascular: Negative for chest pain, palpitations and leg swelling.   Gastrointestinal: Negative for constipation, diarrhea and vomiting.   Genitourinary: Negative for decreased urine volume, difficulty urinating and menstrual problem.   Musculoskeletal: Negative for arthralgias and joint swelling.   Skin: Negative for rash and wound.   Neurological: Negative for syncope and headaches.   Psychiatric/Behavioral: Negative for behavioral problems and sleep disturbance.       Objective:      Physical Exam   Constitutional: She is oriented to person, place, and time. She appears well-developed and well-nourished. No distress.   HENT:   Right Ear: External ear normal.   Left Ear: External ear normal.   Nose: Nose normal.   Mouth/Throat: Oropharynx is clear and moist. No oropharyngeal exudate.   Neck: No JVD present. No thyromegaly present.   Cardiovascular: Normal rate, regular rhythm and normal heart sounds.   No murmur heard.  Pulmonary/Chest: Effort normal. No respiratory distress. She has wheezes. She has no rales.   Minimal resp distress and end exp wheeze throughout. Deep cough, can speak uninterupted   Musculoskeletal: She exhibits no edema.   Lymphadenopathy:     She has no cervical adenopathy.   Neurological: She is alert  and oriented to person, place, and time. No cranial nerve deficit. Coordination normal.   Skin: Capillary refill takes less than 2 seconds. No rash noted.   Psychiatric: She has a normal mood and affect. Her behavior is normal. Judgment and thought content normal.       Assessment:       1. Influenza    2. Mucopurulent chronic bronchitis    3. Controlled type 2 diabetes mellitus with stage 3 chronic kidney disease, without long-term current use of insulin        Plan:       Influenza  -     oseltamivir (TAMIFLU) 75 MG capsule; Take 1 capsule (75 mg total) by mouth 2 (two) times daily. for 5 days  Dispense: 10 capsule; Refill: 0  -     triamcinolone acetonide injection 40 mg    Mucopurulent chronic bronchitis    Controlled type 2 diabetes mellitus with stage 3 chronic kidney disease, without long-term current use of insulin    She will start duoneb q 4 hours, monitor BS at least BID to monitor hyperglycemia after kenalog. If worsens to ER over weekend. F/U as needed. Rest, hydration d/w patient and brother/

## 2020-02-21 NOTE — PATIENT INSTRUCTIONS
USE OVER-THE-COUNTER MUCINEX DM CAPSULES OR LIQUID FOR COUGH AS DIRECTED ON , PRESCRIPTION COUGH SYRUP NOT ADVISED DUE TO YOUR OTHER MEDICATIONS.

## 2020-02-21 NOTE — TELEPHONE ENCOUNTER
----- Message from Karen Royal sent at 2/21/2020  7:38 AM CST -----  Contact: pt  Please call pt [phone], pt states he need to speak with Latasha about his sister.

## 2020-02-24 ENCOUNTER — HOSPITAL ENCOUNTER (EMERGENCY)
Facility: HOSPITAL | Age: 75
Discharge: HOME | End: 2020-02-25
Attending: EMERGENCY MEDICINE
Payer: MEDICARE

## 2020-02-24 ENCOUNTER — TELEPHONE (OUTPATIENT)
Dept: PAIN MEDICINE | Facility: CLINIC | Age: 75
End: 2020-02-24

## 2020-02-24 DIAGNOSIS — R06.02 SOB (SHORTNESS OF BREATH): ICD-10-CM

## 2020-02-24 DIAGNOSIS — J20.9 ACUTE BRONCHITIS, UNSPECIFIED ORGANISM: ICD-10-CM

## 2020-02-24 DIAGNOSIS — J44.1 COPD WITH ACUTE EXACERBATION: Primary | ICD-10-CM

## 2020-02-24 PROCEDURE — 99284 EMERGENCY DEPT VISIT MOD MDM: CPT | Mod: 25

## 2020-02-24 PROCEDURE — 25000242 PHARM REV CODE 250 ALT 637 W/ HCPCS: Performed by: EMERGENCY MEDICINE

## 2020-02-24 PROCEDURE — 94640 AIRWAY INHALATION TREATMENT: CPT

## 2020-02-24 RX ORDER — IPRATROPIUM BROMIDE AND ALBUTEROL SULFATE 2.5; .5 MG/3ML; MG/3ML
3 SOLUTION RESPIRATORY (INHALATION)
Status: COMPLETED | OUTPATIENT
Start: 2020-02-24 | End: 2020-02-24

## 2020-02-24 RX ORDER — PREDNISONE 20 MG/1
40 TABLET ORAL
Status: COMPLETED | OUTPATIENT
Start: 2020-02-24 | End: 2020-02-25

## 2020-02-24 RX ADMIN — IPRATROPIUM BROMIDE AND ALBUTEROL SULFATE 3 ML: .5; 3 SOLUTION RESPIRATORY (INHALATION) at 11:02

## 2020-02-24 NOTE — TELEPHONE ENCOUNTER
"Spoke with patients brither/caregiver  Serjio smith      regarding procedure scheduled on 2/25/2020  Arrival time 0650    Has patient been sick with fever or on antibiotics within the last 7 days? TAMIFLU PER DR. KRAMER 2/21/2020  "Brother and family has flu. She had flu vaccine. Yesterday myalgias, low grade fever, cough, wheeze, nasal congestion. Taking Breo 200 daily, just started duoneb. Not checking BS but no hyperglycemic symptoms."     SPOKE TO DR. KEBEDE. OK TO PROCEED WITH PLANNED PROCEDURE AS LONG AS PT IS ASYMPTOMATIC. NO SIGNS OF INFECTION. PT NOTIFIED. PT DENIES FEVER, PRODUCTIVE COUGH OR ANY OTHER SIGNS OF INFECTION.    Has patient received a vaccination within the last 7 days? no  Has the patient stopped all medications as directed? Yes, last dose of plavix and mobic on 2/18/2020  Does patient have a pacemaker and or defibrillator? no  Does the patient have a ride to and from procedure and someone reliable to remain with patient? Yes, brother serjio  Is the patient diabetic? yes  Does the patient have sleep apnea? Or use O2 at home? No no  Is the patient receiving sedation? Yes, light  Is the patient instructed to remain NPO beginning at midnight the night before their procedure? yes  Procedure location confirmed with patient? yes  Travel screening: negative    CONFIRMED//LP  "

## 2020-02-25 ENCOUNTER — TELEPHONE (OUTPATIENT)
Dept: SURGERY | Facility: HOSPITAL | Age: 75
End: 2020-02-25

## 2020-02-25 ENCOUNTER — TELEPHONE (OUTPATIENT)
Dept: PAIN MEDICINE | Facility: CLINIC | Age: 75
End: 2020-02-25

## 2020-02-25 VITALS
HEART RATE: 87 BPM | HEIGHT: 64 IN | DIASTOLIC BLOOD PRESSURE: 64 MMHG | RESPIRATION RATE: 16 BRPM | SYSTOLIC BLOOD PRESSURE: 123 MMHG | OXYGEN SATURATION: 100 % | BODY MASS INDEX: 22.35 KG/M2 | WEIGHT: 130.94 LBS | TEMPERATURE: 98 F

## 2020-02-25 PROCEDURE — 25000003 PHARM REV CODE 250: Performed by: EMERGENCY MEDICINE

## 2020-02-25 PROCEDURE — 63600175 PHARM REV CODE 636 W HCPCS: Performed by: EMERGENCY MEDICINE

## 2020-02-25 RX ORDER — BENZONATATE 100 MG/1
100 CAPSULE ORAL 3 TIMES DAILY PRN
Qty: 20 CAPSULE | Refills: 0 | Status: SHIPPED | OUTPATIENT
Start: 2020-02-25 | End: 2020-03-06

## 2020-02-25 RX ORDER — AZITHROMYCIN 250 MG/1
500 TABLET, FILM COATED ORAL
Status: COMPLETED | OUTPATIENT
Start: 2020-02-25 | End: 2020-02-25

## 2020-02-25 RX ORDER — BENZONATATE 100 MG/1
200 CAPSULE ORAL
Status: COMPLETED | OUTPATIENT
Start: 2020-02-25 | End: 2020-02-25

## 2020-02-25 RX ORDER — AZITHROMYCIN 250 MG/1
250 TABLET, FILM COATED ORAL DAILY
Qty: 4 TABLET | Refills: 0 | Status: SHIPPED | OUTPATIENT
Start: 2020-02-25 | End: 2020-02-29

## 2020-02-25 RX ORDER — PREDNISONE 20 MG/1
40 TABLET ORAL DAILY
Qty: 10 TABLET | Refills: 0 | Status: SHIPPED | OUTPATIENT
Start: 2020-02-25 | End: 2020-02-29

## 2020-02-25 RX ADMIN — AZITHROMYCIN MONOHYDRATE 500 MG: 250 TABLET ORAL at 02:02

## 2020-02-25 RX ADMIN — BENZONATATE 200 MG: 100 CAPSULE ORAL at 02:02

## 2020-02-25 RX ADMIN — PREDNISONE 40 MG: 20 TABLET ORAL at 12:02

## 2020-02-25 NOTE — TELEPHONE ENCOUNTER
PT was taken to the ER last night due to bad cough spoke with brother Rm and he ask that she be taken off for scheduled procedure 02/25/2020 and he would follow up on when she would be able to reschedule procedure.  All questions answered at this time.//dp

## 2020-02-25 NOTE — ED PROVIDER NOTES
SCRIBE #1 NOTE: I, Neri Lopez, am scribing for, and in the presence of, Clarence Toth MD. I have scribed the entire note.       History     Chief Complaint   Patient presents with    Chest Congestion     patient reports cough and chest congestion. dx with flu on friday. worsening in symptoms     Review of patient's allergies indicates:   Allergen Reactions    Doxycycline Nausea Only    Penicillins Anaphylaxis, Hives, Shortness Of Breath and Swelling    Oxycodone Other (See Comments)     Fatigue      Penicillin g benzathine Other (See Comments)    Adhesive Rash    Betadine [povidone-iodine] Rash     Pt confirms has completed CT w/ IV contrast-iodine without reaction or need for pre-medication.    Sulfa (sulfonamide antibiotics) Itching         History of Present Illness     HPI    2/24/2020, 11:24 PM  History obtained from the patient      History of Present Illness: Shireen Felix is a 74 y.o. female patient with a h/o influenza who presents to the Emergency Department for evaluation of chest congestion which onset several days ago. Symptoms are constant and moderate in severity. No mitigating or exacerbating factors reported. Associated sxs include productive cough and fever. Patient denies any chills, n/v, CP, SOB, HA, and all other sxs at this time. Prior Tx includes IM steroid. No further complaints or concerns at this time. Patient previously diagnosed with influenza 3 days ago.      Arrival mode: Personal transportation     PCP: POOL Chaudhari Jr, MD      Past Medical History:  Past Medical History:   Diagnosis Date    Abnormal ECG 8/5/2019    Aneurysm     Anticoagulant long-term use     Arthritis     CAD in native artery 8/5/2019    COPD (chronic obstructive pulmonary disease)     Diabetes mellitus     Glaucoma     Hypertension     Seizures     Shingles 05/27/2017    Stroke        Past Surgical History:  Past Surgical History:   Procedure Laterality Date    BRAIN SURGERY       CARDIAC CATHETERIZATION      CORONARY ANGIOPLASTY      EPIDURAL STEROID INJECTION INTO LUMBAR SPINE Bilateral 11/12/2019    Procedure: TF XANDER L4/5;  Surgeon: Desean Dias MD;  Location: [facility];  Service: Pain Management;  Laterality: Bilateral;    HYSTERECTOMY      INJECTION OF ANESTHETIC AGENT AROUND MEDIAL BRANCH NERVES INNERVATING LUMBAR FACET JOINT Bilateral 1/31/2020    Procedure: Bilateral L3-5 MBB;  Surgeon: Desean Dias MD;  Location: [facility];  Service: Pain Management;  Laterality: Bilateral;    TRANSFORAMINAL EPIDURAL INJECTION OF STEROID Bilateral 7/23/2019    Procedure: Bilateral L3/4 Transforaminal Epidural Steroid Injection;  Surgeon: Desean Dias MD;  Location: [facility];  Service: Pain Management;  Laterality: Bilateral;         Family History:  Family History   Problem Relation Age of Onset    No Known Problems Mother     No Known Problems Father        Social History:   Social History     Tobacco Use    Smoking status: Former Smoker     Packs/day: 1.00     Years: 10.00     Pack years: 10.00     Types: Cigarettes     Last attempt to quit: 4/11/2004     Years since quitting: 15.8    Smokeless tobacco: Never Used   Substance and Sexual Activity    Alcohol use: No    Drug use: Never    Sexual activity: Not Currently        Review of Systems     Review of Systems   Constitutional: Positive for fever. Negative for chills.   HENT: Positive for congestion. Negative for sore throat.    Respiratory: Positive for cough. Negative for shortness of breath.    Cardiovascular: Negative for chest pain.   Gastrointestinal: Negative for nausea and vomiting.   Genitourinary: Negative for dysuria.   Musculoskeletal: Negative for back pain.   Skin: Negative for rash.   Neurological: Negative for weakness and headaches.   Hematological: Does not bruise/bleed easily.   All other systems reviewed and are negative.       Physical Exam     Initial Vitals [02/24/20 2236]   BP Pulse  "Resp Temp SpO2   (!) 190/79 88 20 98.3 °F (36.8 °C) 99 %      MAP       --          Physical Exam  Nursing Notes and Vital Signs Reviewed.  Constitutional: Well-developed and well-nourished. NAD  Head: Atraumatic. Normocephalic.  Eyes: PERRL. EOM intact. Conjunctivae are not pale. No scleral icterus.  ENT: Mucous membranes are moist. Oropharynx is clear and symmetric.    Neck: Supple. Full ROM. No lymphadenopathy.  Cardiovascular: Regular rate. Regular rhythm. No murmurs, rubs, or gallops. Distal pulses are 2+ and symmetric.  Pulmonary/Chest: No respiratory distress. Frequent wet cough. Scattered crackles. Diffuse expiratory wheezing.  Abdominal: Soft and non-distended.  There is no tenderness.  No rebound, guarding, or rigidity. Good bowel sounds.  Genitourinary: No CVA tenderness  Musculoskeletal: Moves all extremities. No obvious deformities. No calf tenderness.  Skin: Warm and dry.  Neurological:  Alert, awake, and appropriate.  Normal speech.  No acute focal neurological deficits are appreciated.  Psychiatric: Normal affect. Good eye contact. Appropriate in content.     ED Course   Procedures  ED Vital Signs:  Vitals:    02/24/20 2236 02/24/20 2343 02/24/20 2345 02/25/20 0006   BP: (!) 190/79   (!) 128/58   Pulse: 88 87 87 92   Resp: 20 20 18 18   Temp: 98.3 °F (36.8 °C)   98 °F (36.7 °C)   TempSrc: Oral   Oral   SpO2: 99% 98% 100% 100%   Weight: 59.4 kg (130 lb 15.2 oz)      Height: 5' 4" (1.626 m)       02/25/20 0210   BP: 123/64   Pulse: 87   Resp: 16   Temp: 98.3 °F (36.8 °C)   TempSrc: Oral   SpO2: 100%   Weight:    Height:          Imaging Results          X-Ray Chest PA And Lateral (Final result)  Result time 02/25/20 07:44:17    Final result by Zeke Mccarthy Jr., MD (02/25/20 07:44:17)                 Impression:      No acute findings from prior study.      Electronically signed by: Zeke Mccarthy Jr., MD  Date:    02/25/2020  Time:    07:44             Narrative:    EXAMINATION:  XR CHEST PA AND " LATERAL    CLINICAL HISTORY:  Shortness of breath    TECHNIQUE:  PA and lateral views of the chest.    COMPARISON:  Prior radiograph from January 14, 2020.    FINDINGS:  The lungs are clear.  No pleural fluid or pneumothorax.    The cardiac silhouette is normal in size. The hilar and mediastinal contours are normal.    No concerning osseous findings.                               1:19 AM: Per ED physician, pt's CXR results: no acute pathology.        The Emergency Provider reviewed the vital signs and test results, which are outlined above.     ED Discussion       1:24 AM: Reassessed pt at this time.  Pt states her condition has improved at this time. Discussed with pt all pertinent ED information and results. Discussed pt dx and plan of tx. Gave pt all f/u and return to the ED instructions. All questions and concerns were addressed at this time. Pt expresses understanding of information and instructions, and is comfortable with plan to discharge. Pt is stable for discharge.    I discussed with patient and/or family/caretaker that evaluation in the ED does not suggest any emergent or life threatening medical conditions requiring immediate intervention beyond what was provided in the ED, and I believe patient is safe for discharge.  Regardless, an unremarkable evaluation in the ED does not preclude the development or presence of a serious of life threatening condition. As such, patient was instructed to return immediately for any worsening or change in current symptoms.    I counseled the patient on the risks of taking antibiotics, including taking all doses as prescribed. I explained the risk of antibiotic resistance in the future and susceptibility to C. diff infection, severe allergic reactions, and yeast infections.     1:43 AM  Antibiotic exception for acute bronchitis due to COPD comorbidity.      MDM        Medical Decision Making:   Clinical Tests:   Radiological Study: Ordered and Reviewed           ED  Medication(s):  Medications   albuterol-ipratropium 2.5 mg-0.5 mg/3 mL nebulizer solution 3 mL (3 mLs Nebulization Given 2/24/20 0742)   predniSONE tablet 40 mg (40 mg Oral Given 2/25/20 0007)   benzonatate capsule 200 mg (200 mg Oral Given 2/25/20 0208)   azithromycin tablet 500 mg (500 mg Oral Given 2/25/20 0209)       Discharge Medication List as of 2/25/2020  1:43 AM      START taking these medications    Details   azithromycin (Z-EMEKA) 250 MG tablet Take 1 tablet (250 mg total) by mouth once daily. for 4 days, Starting Tue 2/25/2020, Until Sat 2/29/2020, Print      benzonatate (TESSALON) 100 MG capsule Take 1 capsule (100 mg total) by mouth 3 (three) times daily as needed for Cough., Starting Tue 2/25/2020, Until Fri 3/6/2020, Print      predniSONE (DELTASONE) 20 MG tablet Take 2 tablets (40 mg total) by mouth once daily. for 4 days, Starting Tue 2/25/2020, Until Sat 2/29/2020, Print             Follow-up Information     E Brody Chaudhari Jr, MD In 1 week.    Specialties:  Internal Medicine, Pediatrics  Contact information:  [address]  [place] [address] [phone]             [facility] - .    Specialty:  Emergency Medicine  Why:  As needed, If symptoms worsen  Contact information:  [address] [facility] [address] [phone]                     Scribe Attestation:   Scribe #1: I performed the above scribed service and the documentation accurately describes the services I performed. I attest to the accuracy of the note.     Attending:   Physician Attestation Statement for Scribe #1: I, Clarence Toth MD, personally performed the services described in this documentation, as scribed by Neri Lopez, in my presence, and it is both accurate and complete.           Clinical Impression       ICD-10-CM ICD-9-CM   1. COPD with acute exacerbation J44.1 491.21   2. SOB (shortness of breath) R06.02 786.05   3. Acute bronchitis, unspecified organism J20.9  466.0       Disposition:   Disposition: Discharged  Condition: Stable         Clarence Toth MD  02/25/20 0851

## 2020-02-25 NOTE — TELEPHONE ENCOUNTER
----- Message from Preeti Manzano sent at 2/24/2020  5:05 PM CST -----  Contact: Pt Brother Rm  Pt brother would like to receive a call back to cancel and reschedule Pt appt for pain management injection.     Pt Brother can be reached at [phone]

## 2020-03-03 ENCOUNTER — PATIENT OUTREACH (OUTPATIENT)
Dept: ADMINISTRATIVE | Facility: HOSPITAL | Age: 75
End: 2020-03-03

## 2020-03-03 NOTE — PROGRESS NOTES
Pt is New to Dr. Chaudhari, lipid panel scheduled for 03/20/2020 for Statin Consideration       Melinda BUTTERFIELD LPN Care Coordinator  Care Coordination Department  [facility]  [phone]

## 2020-03-03 NOTE — PRE-PROCEDURE INSTRUCTIONS
Spoke with   patient    regarding procedure scheduled on 3/10/2020  Arrival time 0820  Has patient been sick with fever or on antibiotics within the last 7 days? no  Has patient received a vaccination within the last 7 days? no  Has the patient stopped all medications as directed? Yes, last dose of vitamin d, plavix, and mobic on 3/2/2020  Does patient have a pacemaker and or defibrillator? no  Does the patient have a ride to and from procedure and someone reliable to remain with patient? Yes brother serjio smith  Is the patient diabetic? yes  Does the patient have sleep apnea? Or use O2 at home? No no  Is the patient receiving sedation? yes  Is the patient instructed to remain NPO beginning at midnight the night before their procedure? yes  Procedure location confirmed with patient? yes  Travel screening: negative

## 2020-03-09 ENCOUNTER — TELEPHONE (OUTPATIENT)
Dept: PAIN MEDICINE | Facility: CLINIC | Age: 75
End: 2020-03-09

## 2020-03-09 NOTE — TELEPHONE ENCOUNTER
----- Message from Ivon Preston sent at 3/9/2020  9:17 AM CDT -----  Contact: brother   Requesting a call back regarding additional questions he has, he would  Not provide any additional information.please call back at [phone].      Thanks,.  Ivon Preston

## 2020-03-09 NOTE — TELEPHONE ENCOUNTER
Call stating pt is having more pain on the left side and was about the RFA//all questions answered

## 2020-03-10 ENCOUNTER — HOSPITAL ENCOUNTER (OUTPATIENT)
Facility: HOSPITAL | Age: 75
Discharge: HOME | End: 2020-03-10
Attending: PAIN MEDICINE | Admitting: PAIN MEDICINE
Payer: MEDICARE

## 2020-03-10 VITALS
TEMPERATURE: 98 F | WEIGHT: 127.63 LBS | RESPIRATION RATE: 18 BRPM | SYSTOLIC BLOOD PRESSURE: 109 MMHG | BODY MASS INDEX: 21.79 KG/M2 | HEIGHT: 64 IN | HEART RATE: 55 BPM | DIASTOLIC BLOOD PRESSURE: 51 MMHG | OXYGEN SATURATION: 100 %

## 2020-03-10 DIAGNOSIS — M54.16 LUMBAR RADICULOPATHY: Primary | ICD-10-CM

## 2020-03-10 LAB — POCT GLUCOSE: 117 MG/DL (ref 70–110)

## 2020-03-10 PROCEDURE — 64483 NJX AA&/STRD TFRM EPI L/S 1: CPT | Mod: 50,,, | Performed by: PAIN MEDICINE

## 2020-03-10 PROCEDURE — 82962 GLUCOSE BLOOD TEST: CPT | Performed by: PAIN MEDICINE

## 2020-03-10 PROCEDURE — 99152 MOD SED SAME PHYS/QHP 5/>YRS: CPT | Performed by: PAIN MEDICINE

## 2020-03-10 PROCEDURE — 99152 MOD SED SAME PHYS/QHP 5/>YRS: CPT | Mod: ,,, | Performed by: PAIN MEDICINE

## 2020-03-10 PROCEDURE — 63600175 PHARM REV CODE 636 W HCPCS: Performed by: PAIN MEDICINE

## 2020-03-10 PROCEDURE — 64483 PR EPIDURAL INJ, ANES/STEROID, TRANSFORAMINAL, LUMB/SACR, SNGL LEVL: ICD-10-PCS | Mod: 50,,, | Performed by: PAIN MEDICINE

## 2020-03-10 PROCEDURE — 64479 NJX AA&/STRD TFRM EPI C/T 1: CPT | Mod: 50 | Performed by: PAIN MEDICINE

## 2020-03-10 PROCEDURE — 99152 PR MOD CONSCIOUS SEDATION, SAME PHYS, 5+ YRS, FIRST 15 MIN: ICD-10-PCS | Mod: ,,, | Performed by: PAIN MEDICINE

## 2020-03-10 RX ORDER — LIDOCAINE HYDROCHLORIDE 10 MG/ML
INJECTION INFILTRATION; PERINEURAL
Status: DISCONTINUED | OUTPATIENT
Start: 2020-03-10 | End: 2020-03-10 | Stop reason: HOSPADM

## 2020-03-10 RX ORDER — FENTANYL CITRATE 50 UG/ML
INJECTION, SOLUTION INTRAMUSCULAR; INTRAVENOUS
Status: DISCONTINUED | OUTPATIENT
Start: 2020-03-10 | End: 2020-03-10 | Stop reason: HOSPADM

## 2020-03-10 RX ORDER — DEXAMETHASONE SODIUM PHOSPHATE 100 MG/10ML
INJECTION INTRAMUSCULAR; INTRAVENOUS
Status: DISCONTINUED | OUTPATIENT
Start: 2020-03-10 | End: 2020-03-10 | Stop reason: HOSPADM

## 2020-03-10 RX ORDER — MIDAZOLAM HYDROCHLORIDE 1 MG/ML
INJECTION, SOLUTION INTRAMUSCULAR; INTRAVENOUS
Status: DISCONTINUED | OUTPATIENT
Start: 2020-03-10 | End: 2020-03-10 | Stop reason: HOSPADM

## 2020-03-10 RX ORDER — GADOBUTROL 604.72 MG/ML
INJECTION INTRAVENOUS
Status: DISCONTINUED | OUTPATIENT
Start: 2020-03-10 | End: 2020-03-10 | Stop reason: HOSPADM

## 2020-03-10 NOTE — OP NOTE
"Procedure: Lumbar Transforaminal Epidural Steroid Injection under Fluoroscopic Guidance (supraneural approach)    Level: T12/L1     Side: Bilateral    PROCEDURE DATE: 3/10/2020    Pre-operative Diagnosis: Lumbar Radiculopathy  Post-operative Diagnosis: Lumbar Radiculopathy    Provider: SCOUT Dias MD  Assistant(s): None    Anesthesia: Local, IV Sedation    >> 1 mg of VERSED    >> 25 mcg of FENTANYL     Indication: Low back pain with radiculopathy consistent with distribution of targeted nerve. Symptoms unresponsive to conservative treatments. Fluoroscopy was used to optimize visualization of needle placement and to maximize safety.     Procedure Description / Technique:  The patient was seen and identified in the preoperative area. Risks, benefits, complications, and alternatives were discussed with the patient. The patient agreed to proceed with the procedure and signed the consent. The site and side of the procedure was identified and marked. An IV was started. The patient was taken to the procedural suite.    The patient was positioned in prone orientation on procedure table and a pillow was placed under the abdomen to reduce lumbar lordosis. A time out was performed prior to any intervention. The procedure, site, side, and allergies were stated and agreed to by all present. The lumbosacral area was widely prepped with ChloraPrep. The procedural site was draped in usual sterile fashion. Vital signs were closely monitored throughout this procedure. Conscious sedation was used for this procedure to decrease patient anxiety.    The target area was visualized under fluoroscopy. The cephalocaudal angle of the fluoroscope was adjusted as to align the vertebral end plates. The fluoroscopic arm was rotated ipsilaterally to an angle of approximately 30 degrees until the "alberto dog" outline came into view and the tip of the inferior superior articular process pointed towards the midline, 6:00 position of the above " "pedicle. A 25 gauge 3.5 inch spinal needle was directed towards the "chin" of the "alberto dog" (adjacent to the pars interarticularis and inferior to the pedicle). The needle was advanced until OS was met at the inferior border of the pedicle / pars interface. The needle was adjusted so that it would pass inferior to the osseous border. The fluoroscope was then placed in the lateral position and the needle was slowly advanced until it rested in the posterior 1/3rd of the vertebral foramen. AP fluoroscopy was checked and the needle tip rested at the 6:00 position under the pedicle. No paresthesia was elicited during needle placement. With the needle tip in its final position, gentle aspiration was negative for blood and CSF. Gadavist (gadobutrol) 1 mmol/mL (1 to 2 mL) was injected under live fluoroscopy. Microbore tubing was used for injection. There was no pain or paresthesia on injection. The contrast clearly delineated the targeted nerve root on AP fluoroscopy. No vascular uptake was seen. A solution containing 2 mL of 1% PF Lidocaine and 2 mL of Dexamethasone (10 mg/mL) was mixed and 2 mL was injected slowly at each level targeted. There was minimal resistance on injection. No pain or paresthesia was elicited on injection. The stylet was replaced and the needle was withdrawn intact. This procedure was performed for each of the above indicated levels.     Description of Findings: Not applicable    Prosthetic devices, grafts, tissues, or devices implanted: None    Specimen Removed: No    Estimated Blood Loss: minimal    COMPLICATIONS: None    DISPOSITION / PLANS: The patient was transferred to the recovery area in a stable condition for observation. The patient was reexamined prior to discharge. There was no evidence of acute neurologic injury following the procedure.  Patient was discharged from the recovery room after meeting discharge criteria. Home discharge instructions were given to the patient by the " staff.

## 2020-03-10 NOTE — INTERVAL H&P NOTE
The patient has been examined and the H&P has been reviewed:    I concur with the findings and changes have been noted since the H&P was written: patient is having symptoms on both sides, therefore will proceed with bilateral T12/L1 TFESI    Anesthesia/Surgery risks, benefits and alternative options discussed and understood by patient/family.          Active Hospital Problems    Diagnosis  POA    Lumbar radiculopathy [M54.16]  Yes      Resolved Hospital Problems   No resolved problems to display.

## 2020-03-10 NOTE — DISCHARGE INSTRUCTIONS

## 2020-03-10 NOTE — PLAN OF CARE
Discharge instructions reviewed with patient, verbalized understanding. 2 injection sites to lower back with gauze and paper tape; clean, dry and intact. Vital signs stable. Discharging home with ride.

## 2020-03-12 NOTE — DISCHARGE SUMMARY
[facility]  Short Stay  Discharge Summary    Admit Date: 3/10/2020    Discharge Date and Time: 3/10/2020  9:55 AM      Discharge Attending Physician: SCOUT Dias MD     Hospital Course (synopsis of major diagnoses, care, treatment, and services provided during the course of the hospital stay): Patient was admitted to Pre-op where informed consent was signed.  The patient was then taken to the procedure suite where the procedure was performed.  The patient was then return to the Pre-Op area and discharge was performed.     Final Diagnoses:    Principal Problem: <principal problem not specified>   Secondary Diagnoses:   Active Hospital Problems    Diagnosis  POA    Lumbar radiculopathy [M54.16]  Yes      Resolved Hospital Problems   No resolved problems to display.       Discharged Condition: good    Disposition: Home or Self Care    Follow up/Patient Instructions:    Medications:  Reconciled Home Medications:      Medication List      CONTINUE taking these medications    albuterol 90 mcg/actuation inhaler  Commonly known as:  PROVENTIL/VENTOLIN HFA  Inhale 2 puffs into the lungs every 4 (four) hours as needed.     albuterol-ipratropium 2.5 mg-0.5 mg/3 mL nebulizer solution  Commonly known as:  DUO-NEB  Take 3 mLs by nebulization every 6 (six) hours as needed for Wheezing. Rescue     amLODIPine 10 MG tablet  Commonly known as:  NORVASC  Take 1 tablet (10 mg total) by mouth once daily.     APTIOM 600 mg Tab  Generic drug:  eslicarbazepine  Take 1 tablet by mouth every evening.     cholecalciferol (vitamin D3) 1,250 mcg (50,000 unit) capsule  Take 1 capsule (50,000 Units total) by mouth every 7 days.     clopidogreL 75 mg tablet  Commonly known as:  PLAVIX  Take 1 tablet (75 mg total) by mouth once daily.     cyclobenzaprine 5 MG tablet  Commonly known as:  FLEXERIL  TAKE 1 TABLET BY MOUTH THREE TIMES DAILY AS NEEDED FOR MUSCLE SPASMS     dexlansoprazole 60 mg capsule  Commonly known as:   DEXILANT  Take 1 capsule (60 mg total) by mouth once daily.     diltiaZEM 120 mg 24 hr tablet  Commonly known as:  CARDIZEM LA  Take 1 tablet (120 mg total) by mouth once daily.     DULoxetine 30 MG capsule  Commonly known as:  CYMBALTA  Take 1 capsule (30 mg total) by mouth once daily.     escitalopram oxalate 20 MG tablet  Commonly known as:  LEXAPRO  Take 1 tablet (20 mg total) by mouth once daily.     fluticasone furoate-vilanteroL 200-25 mcg/dose Dsdv diskus inhaler  Commonly known as:  BREO ELLIPTA  Inhale 1 puff into the lungs every evening.     fluticasone propionate 50 mcg/actuation nasal spray  Commonly known as:  FLONASE  1 spray (50 mcg total) by Each Nostril route once daily.     gemfibroziL 600 MG tablet  Commonly known as:  LOPID  Take 1 tablet (600 mg total) by mouth once daily.     GI COCKTAIL SIMPLE RECORD  Take 50 mLs by mouth 2 (two) times daily.     guaifenesin 100 mg/5 ml 100 mg/5 mL syrup  Commonly known as:  ROBITUSSIN  Take 200 mg by mouth as needed for Cough.     loratadine 10 mg tablet  Commonly known as:  CLARITIN  Take 1 tablet by mouth once daily.     MELATONIN ORAL  Take by mouth nightly as needed.     meloxicam 7.5 MG tablet  Commonly known as:  MOBIC  Take 1 tablet (7.5 mg total) by mouth once daily.     metFORMIN 500 MG tablet  Commonly known as:  GLUCOPHAGE  Take 1 tablet (500 mg total) by mouth 2 (two) times daily.     metoprolol tartrate 25 MG tablet  Commonly known as:  LOPRESSOR  TAKE 1 TABLET BY MOUTH TWICE DAILY WITH FOOD FOR HEART AND BLOOD PRESSURE     mupirocin 2 % ointment  Commonly known as:  BACTROBAN  Apply topically 3 (three) times daily.     PROBIOTIC ACIDOPHILUS ORAL  Take 1 capsule by mouth 2 (two) times daily.     PROLIA 60 mg/mL Syrg  Generic drug:  denosumab  every 6 (six) months.     ranitidine 150 MG tablet  Commonly known as:  ZANTAC  TAKE 1 TABLET BY MOUTH TWICE DAILY STOMACH ACID     ranolazine 1,000 mg Tb12  Commonly known as:  RANEXA  Take 1 tablet (1,000  mg total) by mouth 2 (two) times daily.     SENNA 8.6 mg Cap  Generic drug:  sennosides  Take by mouth as needed.     tolterodine 4 MG 24 hr capsule  Commonly known as:  DETROL LA  Take 4 mg by mouth once daily.     valsartan 320 MG tablet  Commonly known as:  DIOVAN  Take 1 tablet (320 mg total) by mouth once daily.     venlafaxine 150 mg Tr24  Take 150 mg by mouth once daily.          Discharge Procedure Orders   Diet general     Call MD for:  severe uncontrolled pain     Call MD for:  difficulty breathing, headache or visual disturbances     Call MD for:  redness, tenderness, or signs of infection (pain, swelling, redness, odor or green/yellow discharge around incision site)     Activity as tolerated

## 2020-03-16 ENCOUNTER — TELEPHONE (OUTPATIENT)
Dept: INTERNAL MEDICINE | Facility: CLINIC | Age: 75
End: 2020-03-16

## 2020-03-16 ENCOUNTER — TELEPHONE (OUTPATIENT)
Dept: PAIN MEDICINE | Facility: CLINIC | Age: 75
End: 2020-03-16

## 2020-03-16 NOTE — TELEPHONE ENCOUNTER
----- Message from Merle Florentino sent at 3/16/2020 10:34 AM CDT -----  Contact: brother serjio Abdalla is requesting a call back to ask questions in regards to health and can be reached at [phone]      Thanks,  Merle Florentino

## 2020-03-16 NOTE — TELEPHONE ENCOUNTER
----- Message from Merle Florentino sent at 3/16/2020 10:36 AM CDT -----  Contact: serjio Henderson called to request a call back in regards to personal matter and can be reached at [phone]        Thanks,  Merle Florentino

## 2020-03-16 NOTE — TELEPHONE ENCOUNTER
Contacted pt's brother. Informed pt's brother that at this point all elective procedures will be cancelled until further notice. I was told that our surgery team will reach out today/tomorrow. Pt was able to verbalize all understanding. All questions answered.//lp

## 2020-03-25 ENCOUNTER — TELEPHONE (OUTPATIENT)
Dept: PAIN MEDICINE | Facility: CLINIC | Age: 75
End: 2020-03-25

## 2020-03-25 NOTE — TELEPHONE ENCOUNTER
In accordance with the guidelines set by the [place] department of health and CDC, we are taking all necessary measures to ensure the safety of yourself, our patients, and our staff. All procedure have been canceled at this time procedure has been removed form snapboard and all f/u appointment have been cancelled will reach out to pt when we can reschedule.  Pt has requested Ibuprofen 600-800 mg and a refill on Tramadol at this time all concerns have been forward to Dr. Dias at this time.//dp

## 2020-03-25 NOTE — TELEPHONE ENCOUNTER
----- Message from Mehul Blount sent at 3/25/2020  3:10 PM CDT -----  Contact: pt brother Alek Rm   Type:  Patient Returning Call    Who Called: pt brother Rm  Who Left Message for Patient:  Does the patient know what this is regarding?:pt sister  Would the patient rather a call back or a response via [facility]? Phone   Best Call Back Number: [phone]  Additional Information:

## 2020-03-26 ENCOUNTER — TELEPHONE (OUTPATIENT)
Dept: PAIN MEDICINE | Facility: CLINIC | Age: 75
End: 2020-03-26

## 2020-03-26 RX ORDER — TRAMADOL HYDROCHLORIDE 50 MG/1
50 TABLET ORAL EVERY 8 HOURS PRN
Qty: 90 TABLET | Refills: 1 | Status: SHIPPED | OUTPATIENT
Start: 2020-03-26 | End: 2020-04-25

## 2020-03-26 NOTE — TELEPHONE ENCOUNTER
Spoke to pt brother Rm about the medication Tramadol and the requested Ibuprofen per Dr. Dias at this time Rm expressed understanding at this time//dp

## 2020-03-26 NOTE — TELEPHONE ENCOUNTER
----- Message from Desean Dias MD sent at 3/26/2020  9:31 AM CDT -----  Sent in some tramadol, she needs to be cautious with this medication due to being lexapro as well.  Also, I can't do the ibuprofen due to her being on plavix.  Would recommend Tylenol Arthritis strength over the counter, not to exceed 3,000mg.  ----- Message -----  From: Lenore Juárez MA  Sent: 3/25/2020   3:48 PM CDT  To: Desean Dias MD    Both procedure for patient has been canceled at this time pt would like something for pain pt stated the Ibuprofen 600-800 mg work the best for her and she would also like a refill on the Tramadol if possible     Please advise  Thanks  Lenore

## 2020-03-30 ENCOUNTER — TELEPHONE (OUTPATIENT)
Dept: INTERNAL MEDICINE | Facility: CLINIC | Age: 75
End: 2020-03-30

## 2020-03-30 NOTE — TELEPHONE ENCOUNTER
----- Message from Андрей Granados sent at 3/30/2020  4:43 PM CDT -----  Contact: brother -Rm --[phone]  .Type:  Patient Returning Call    Who Called:Rm   Who Left Message for Patient:Sayra   Does the patient know what this is regarding?:unsure   Would the patient rather a call back or a response via [facility]? Call back   Best Call Back Number:.[phone] (home)   Additional Information:       Thank You For All You Do !   Андрей Granados

## 2020-03-30 NOTE — TELEPHONE ENCOUNTER
----- Message from Jeanine Aviles sent at 3/30/2020 12:44 PM CDT -----  Contact: Huntert brother, Rm Abdalla needs to speak to nurse but would not say why, please call him back at [phone]. Thank you

## 2020-03-30 NOTE — TELEPHONE ENCOUNTER
S/w pt's brother Rm stating pt's nerve burn sx has been c/x by Dr. Dias office d/t COVID-19 risk, and per Dr. Dias (see documenation 03/26/20) advised brother Dr. Dias approved rx for Tramadol 50mg for pain, but per Dr. Dias cannot rx IBU since pt on Plavix and he recommends OTC Tylenol Arthritis, pt not to exceed 3,000mg daily. Brother voiced understanding, confirmed instrx, and to CB PRN.

## 2020-04-09 ENCOUNTER — TELEPHONE (OUTPATIENT)
Dept: INTERNAL MEDICINE | Facility: CLINIC | Age: 75
End: 2020-04-09

## 2020-04-09 RX ORDER — MONTELUKAST SODIUM 10 MG/1
1 TABLET ORAL DAILY
COMMUNITY
Start: 2020-03-14 | End: 2020-04-28 | Stop reason: SDUPTHER

## 2020-04-09 RX ORDER — ROFLUMILAST 500 UG/1
1 TABLET ORAL DAILY
COMMUNITY
Start: 2020-03-14 | End: 2020-07-13 | Stop reason: SDUPTHER

## 2020-04-09 RX ORDER — TIOTROPIUM BROMIDE 18 UG/1
1 CAPSULE ORAL; RESPIRATORY (INHALATION) DAILY
COMMUNITY
Start: 2020-03-14 | End: 2020-09-23

## 2020-04-09 NOTE — TELEPHONE ENCOUNTER
Reviewed as below with pt's brother per NP Jeremias brother voiced understanding, confirmed home care instrx as given, and to CB PRN and w/ worsening s/s/fever.

## 2020-04-09 NOTE — TELEPHONE ENCOUNTER
S/w pt's brother Rm stating pt c/o nasal congestion, intermittent prod cough, and sore throat, brother denies:  Denies SOB  Denies CP  Denies Fever  Denies pt has been out of house in 1mth nor been around other people.  Brother reports pt responsed to steroid rx in past, pt reports has refused COVID-19 testing offered and going to ER for eval. Advised brother would send message to NP Jeremias, and let brother know when we receive NP's response. Brother voiced understanding.

## 2020-04-13 ENCOUNTER — TELEPHONE (OUTPATIENT)
Dept: INTERNAL MEDICINE | Facility: CLINIC | Age: 75
End: 2020-04-13

## 2020-04-13 RX ORDER — NEOMYCIN SULFATE, POLYMYXIN B SULFATE AND HYDROCORTISONE 10; 3.5; 1 MG/ML; MG/ML; [USP'U]/ML
4 SUSPENSION/ DROPS AURICULAR (OTIC) 4 TIMES DAILY
Qty: 10 ML | Refills: 0 | Status: SHIPPED | OUTPATIENT
Start: 2020-04-13 | End: 2022-01-16 | Stop reason: ALTCHOICE

## 2020-04-13 NOTE — TELEPHONE ENCOUNTER
----- Message from Harper Cummings sent at 4/13/2020 11:01 AM CDT -----  Contact: serjiozahida  returned call....[phone] (home)

## 2020-04-13 NOTE — TELEPHONE ENCOUNTER
----- Message from Bib Philippe sent at 4/13/2020  9:30 AM CDT -----  Contact: Brother/Rm Aguirreneth called in and stated patient has a bad right ear infection and wanted to see if something could be called in for patient?      [facility] [facility]  [address]  [address] [address] [place] [address]  Phone: [phone] Fax: [phone]    Rm's call back number is [phone]

## 2020-04-13 NOTE — TELEPHONE ENCOUNTER
S/w pt's brother Rm and pt c/o Rt ear radiating to jaw w/ yellow d/c from canal, pt denies loss of hearing, temp <100*, pt denies other s/s at this time, pt reports has had previous ear infx in same ear. Advised brother and pt would send message to NP Jeremias, and let them know when we receive NP's response. Pt and brother voiced understanding.

## 2020-04-13 NOTE — TELEPHONE ENCOUNTER
Advised pt's brother per NP Jeremias rx for abx ear drops sent to pharmacy, and after pt uses drops if s/s not improved to let us know and we will sched pt w/ ENT. Brother voiced understanding and to CB PRN.

## 2020-04-27 NOTE — TELEPHONE ENCOUNTER
----- Message from Delroy Ellis sent at 4/27/2020  9:05 AM CDT -----  Contact: Rm  Would like cb to discuss pt's medication.       [phone]

## 2020-04-27 NOTE — TELEPHONE ENCOUNTER
Returned call to pt's brother regarding medication refill. No answer, left message advising him to return call to clinic.

## 2020-04-27 NOTE — TELEPHONE ENCOUNTER
Returned call to pt's brother. He states when pt was in Nursing home, they sent her home with seizure med, Aptiom, and scheduled an appt with a provider in [place]. She is out of her seizure medication and at the time that appointment was scheduled, Dr. Chaudhari was out of the clinic and that provider temporarily refilled the rx. I advised him that Dr. Chaudhari is currently out of the clinic but I will give the message to Dr. Chaudhari's NP, Olivia Mcdowell and return call once it has been sent to the pharmacy. He would like the medication to go to [facility] pharmacy.

## 2020-04-28 DIAGNOSIS — K21.9 GASTROESOPHAGEAL REFLUX DISEASE, ESOPHAGITIS PRESENCE NOT SPECIFIED: ICD-10-CM

## 2020-04-28 DIAGNOSIS — F32.A DEPRESSION, UNSPECIFIED DEPRESSION TYPE: ICD-10-CM

## 2020-04-28 DIAGNOSIS — N32.89 BLADDER SPASMS: ICD-10-CM

## 2020-04-28 DIAGNOSIS — J30.9 CHRONIC ALLERGIC RHINITIS: ICD-10-CM

## 2020-04-28 DIAGNOSIS — I10 ESSENTIAL HYPERTENSION: Primary | ICD-10-CM

## 2020-04-28 NOTE — TELEPHONE ENCOUNTER
Fax refill requests rec'vd from pt's pharm, sent to [facility] Mcdowell for auth [Montelukast, Tolterodine, Escitalopram, Duloxetine, Dexilant, Metoprolol, Venlafaxine].    LV 02/21/20  NV DUE 06/2020 (Recall in place)

## 2020-04-28 NOTE — TELEPHONE ENCOUNTER
Called pt's brother regarding medication refill. Advised him that medication has been sent in to the pharmacy for pt but any further refills will need to be follow up with neurologist until pt is stable. He verbalized understanding.

## 2020-04-29 RX ORDER — MONTELUKAST SODIUM 10 MG/1
10 TABLET ORAL DAILY
Qty: 90 TABLET | Refills: 3 | Status: SHIPPED | OUTPATIENT
Start: 2020-04-29 | End: 2021-02-01

## 2020-04-29 RX ORDER — VENLAFAXINE HYDROCHLORIDE 150 MG/1
150 TABLET, EXTENDED RELEASE ORAL DAILY
Qty: 90 EACH | Refills: 3 | OUTPATIENT
Start: 2020-04-29

## 2020-04-29 RX ORDER — DULOXETIN HYDROCHLORIDE 30 MG/1
30 CAPSULE, DELAYED RELEASE ORAL DAILY
Qty: 90 CAPSULE | Refills: 3 | Status: SHIPPED | OUTPATIENT
Start: 2020-04-29 | End: 2021-03-01 | Stop reason: SDUPTHER

## 2020-04-29 RX ORDER — METOPROLOL TARTRATE 25 MG/1
25 TABLET, FILM COATED ORAL 2 TIMES DAILY
Qty: 180 TABLET | Refills: 3 | Status: SHIPPED | OUTPATIENT
Start: 2020-04-29 | End: 2021-02-01

## 2020-04-29 RX ORDER — TOLTERODINE 4 MG/1
4 CAPSULE, EXTENDED RELEASE ORAL DAILY
Qty: 90 CAPSULE | Refills: 3 | Status: SHIPPED | OUTPATIENT
Start: 2020-04-29 | End: 2021-02-01

## 2020-04-29 RX ORDER — ESCITALOPRAM OXALATE 20 MG/1
20 TABLET ORAL DAILY
Qty: 90 TABLET | Refills: 3 | OUTPATIENT
Start: 2020-04-29

## 2020-04-29 RX ORDER — DEXLANSOPRAZOLE 60 MG/1
60 CAPSULE, DELAYED RELEASE ORAL DAILY
Qty: 90 CAPSULE | Refills: 3 | Status: SHIPPED | OUTPATIENT
Start: 2020-04-29 | End: 2021-02-01 | Stop reason: SDUPTHER

## 2020-04-30 ENCOUNTER — TELEPHONE (OUTPATIENT)
Dept: PAIN MEDICINE | Facility: CLINIC | Age: 75
End: 2020-04-30

## 2020-04-30 NOTE — TELEPHONE ENCOUNTER
Spoke with brother and he stated he would ask pt to see if she wants to do 05/07,05/12, or 05/15 for procedure and call us back. All questions answered.

## 2020-04-30 NOTE — TELEPHONE ENCOUNTER
S/w pt and brother Rm unable to confirm whether taking both meds Escitalopram vs Venlafaxine, pt denies seeing Psych provider >2yrs, pt and brother confirmed they would get med bottles from pt's dtr who fills pt's med box and call back w/ info from med bottles. Appt w/ Dr. Watson jimenez for 06/15/20 for pt's mobile wc eval, pt and brother confirmed appt date/time and to CB as directed.

## 2020-05-01 ENCOUNTER — TELEPHONE (OUTPATIENT)
Dept: INTERNAL MEDICINE | Facility: CLINIC | Age: 75
End: 2020-05-01

## 2020-05-01 NOTE — TELEPHONE ENCOUNTER
S/w pt's brother Rm, he confirmed pt looked at all med bottles and pt confirmed taking both Escitalopram and Venlafaxine. Advised brother would inform NP Mcdowell, and let pt/brother know when we receive NP's response. Brother voiced understanding.

## 2020-05-01 NOTE — TELEPHONE ENCOUNTER
----- A user error has taken place: encounter opened in error, closed for administrative reasons.

## 2020-05-04 ENCOUNTER — TELEPHONE (OUTPATIENT)
Dept: PAIN MEDICINE | Facility: CLINIC | Age: 75
End: 2020-05-04

## 2020-05-04 NOTE — TELEPHONE ENCOUNTER
----- Message from Jeanine Aviles sent at 5/4/2020 10:59 AM CDT -----  Contact: Patients brother, serjio Abdalla is returning a call for patient, please call him back at [phone]. Thank you

## 2020-05-06 ENCOUNTER — TELEPHONE (OUTPATIENT)
Dept: PAIN MEDICINE | Facility: CLINIC | Age: 75
End: 2020-05-06

## 2020-05-06 NOTE — TELEPHONE ENCOUNTER
Could not LVM needs to get pt scheduled for in clinic visit before pt can proceed with procedure//dp

## 2020-05-07 ENCOUNTER — TELEPHONE (OUTPATIENT)
Dept: PAIN MEDICINE | Facility: CLINIC | Age: 75
End: 2020-05-07

## 2020-05-07 NOTE — TELEPHONE ENCOUNTER
Spoke to Rm and he sates she would hold off on all procedure due to the covid at this time her pain is improving//dp

## 2020-05-08 ENCOUNTER — HOSPITAL ENCOUNTER (EMERGENCY)
Facility: HOSPITAL | Age: 75
Discharge: HOME | End: 2020-05-08
Attending: EMERGENCY MEDICINE
Payer: MEDICARE

## 2020-05-08 ENCOUNTER — TELEPHONE (OUTPATIENT)
Dept: INTERNAL MEDICINE | Facility: CLINIC | Age: 75
End: 2020-05-08

## 2020-05-08 VITALS
SYSTOLIC BLOOD PRESSURE: 166 MMHG | RESPIRATION RATE: 18 BRPM | DIASTOLIC BLOOD PRESSURE: 73 MMHG | TEMPERATURE: 98 F | HEART RATE: 105 BPM | OXYGEN SATURATION: 100 %

## 2020-05-08 DIAGNOSIS — S60.811A ABRASION OF RIGHT WRIST, INITIAL ENCOUNTER: ICD-10-CM

## 2020-05-08 DIAGNOSIS — M25.539 WRIST PAIN: ICD-10-CM

## 2020-05-08 DIAGNOSIS — S60.211A CONTUSION OF RIGHT WRIST, INITIAL ENCOUNTER: Primary | ICD-10-CM

## 2020-05-08 DIAGNOSIS — M79.606 LEG PAIN: ICD-10-CM

## 2020-05-08 DIAGNOSIS — S40.012A CONTUSION OF LEFT SHOULDER, INITIAL ENCOUNTER: ICD-10-CM

## 2020-05-08 PROCEDURE — 25000003 PHARM REV CODE 250: Mod: HCNC | Performed by: NURSE PRACTITIONER

## 2020-05-08 PROCEDURE — 99284 EMERGENCY DEPT VISIT MOD MDM: CPT | Mod: 25,HCNC

## 2020-05-08 RX ORDER — CLINDAMYCIN HYDROCHLORIDE 300 MG/1
300 CAPSULE ORAL EVERY 6 HOURS
Qty: 20 CAPSULE | Refills: 0 | Status: SHIPPED | OUTPATIENT
Start: 2020-05-08 | End: 2020-05-13

## 2020-05-08 RX ORDER — TRAMADOL HYDROCHLORIDE 50 MG/1
50 TABLET ORAL EVERY 6 HOURS PRN
Qty: 12 TABLET | Refills: 0 | Status: SHIPPED | OUTPATIENT
Start: 2020-05-08 | End: 2021-05-10

## 2020-05-08 RX ADMIN — BACITRACIN ZINC, POLYMYXIN B SULFATE, NEOMYCIN SULFATE 1 EACH: 400; 5000; 3.5 OINTMENT TOPICAL at 03:05

## 2020-05-08 NOTE — ED NOTES
Bed: Dispo 1  Expected date: 5/7/20  Expected time: 11:00 PM  Means of arrival:   Comments:  Phlebotomy

## 2020-05-08 NOTE — ED PROVIDER NOTES
Encounter Date: 5/8/2020       History     Chief Complaint   Patient presents with    Fall     pt fell out of wheelchair 2 days ago, lac to right wrist, hurt right shoulder and leg     74 year old female with complaint of right wrist pain, left shoulder pain, and left lower leg pain since fall 2 days ago.  Pt reports that she fell out of her wheelchair 2 days ago.  No LOC.  No back pain.  Reports mild pain.          Review of patient's allergies indicates:   Allergen Reactions    Doxycycline Nausea Only    Penicillins Anaphylaxis, Hives, Shortness Of Breath and Swelling    Oxycodone Other (See Comments)     Fatigue      Penicillin g benzathine Other (See Comments)    Adhesive Rash    Betadine [povidone-iodine] Rash     Pt confirms has completed CT w/ IV contrast-iodine without reaction or need for pre-medication.    Sulfa (sulfonamide antibiotics) Itching     Past Medical History:   Diagnosis Date    Abnormal ECG 8/5/2019    Aneurysm     Anticoagulant long-term use     Arthritis     CAD in native artery 8/5/2019    COPD (chronic obstructive pulmonary disease)     Diabetes mellitus     Glaucoma     Hypertension     Seizures     Shingles 05/27/2017    Stroke      Past Surgical History:   Procedure Laterality Date    BRAIN SURGERY      CARDIAC CATHETERIZATION      CORONARY ANGIOPLASTY      EPIDURAL STEROID INJECTION INTO LUMBAR SPINE Bilateral 11/12/2019    Procedure: TF XANDER L4/5;  Surgeon: Desean Dias MD;  Location: [facility];  Service: Pain Management;  Laterality: Bilateral;    HYSTERECTOMY      INJECTION OF ANESTHETIC AGENT AROUND MEDIAL BRANCH NERVES INNERVATING LUMBAR FACET JOINT Bilateral 1/31/2020    Procedure: Bilateral L3-5 MBB;  Surgeon: Desean Dias MD;  Location: [facility];  Service: Pain Management;  Laterality: Bilateral;    TRANSFORAMINAL EPIDURAL INJECTION OF STEROID Bilateral 7/23/2019    Procedure: Bilateral L3/4 Transforaminal Epidural Steroid Injection;   Surgeon: Desean Dias MD;  [facility];  Service: Pain Management;  Laterality: Bilateral;    TRANSFORAMINAL EPIDURAL INJECTION OF STEROID Bilateral 3/10/2020    Procedure: Right T12/L1 TF XANDER with local;  Surgeon: Desean Dias MD;  Location: [facility];  Service: Pain Management;  Laterality: Bilateral;     Family History   Problem Relation Age of Onset    No Known Problems Mother     No Known Problems Father      Social History     Tobacco Use    Smoking status: Former Smoker     Packs/day: 1.00     Years: 10.00     Pack years: 10.00     Types: Cigarettes     Last attempt to quit: 2004     Years since quittin.0    Smokeless tobacco: Never Used   Substance Use Topics    Alcohol use: No    Drug use: Never     Review of Systems   Constitutional: Negative for fever.   HENT: Negative for sore throat.    Respiratory: Negative for shortness of breath.    Cardiovascular: Negative for chest pain.   Gastrointestinal: Negative for nausea.   Genitourinary: Negative for dysuria.   Musculoskeletal: Negative for back pain.        Right wrist pain, left leg pain, left shoulder pain    Skin: Negative for rash.   Neurological: Negative for weakness.   Hematological: Does not bruise/bleed easily.       Physical Exam     Initial Vitals [20 1333]   BP Pulse Resp Temp SpO2   (!) 166/73 105 18 98.3 °F (36.8 °C) 100 %      MAP       --         Physical Exam    Nursing note and vitals reviewed.  Constitutional: She appears well-developed and well-nourished.   HENT:   Head: Normocephalic and atraumatic.   Eyes: Conjunctivae and EOM are normal. Pupils are equal, round, and reactive to light.   Neck: Normal range of motion. Neck supple.   Cardiovascular: Normal rate, regular rhythm, normal heart sounds and intact distal pulses.   Pulmonary/Chest: Breath sounds normal.   Abdominal: Soft. There is no tenderness. There is no rebound and no guarding.   Musculoskeletal: Normal range of motion.   Mild  left shoulder tenderness, mild left mid lateral fibula, tenderness dorsal aspect of right wrist, full ROM X 4, 5/5 X 4, 2+ right radial pulse   Neurological: She is alert and oriented to person, place, and time. She has normal strength and normal reflexes.   Skin: Skin is warm and dry.   1/2 cm abrasion right mid dorsal wrist   Psychiatric: She has a normal mood and affect. Her behavior is normal. Thought content normal.         ED Course   Procedures  Labs Reviewed - No data to display       Imaging Results          X-Ray Wrist Complete Right (Final result)  Result time 05/08/20 14:17:15    Final result by Brody Willis MD (05/08/20 14:17:15)                 Impression:      No acute fracture or dislocation.      Electronically signed by: Brody Willis MD  Date:    05/08/2020  Time:    14:17             Narrative:    EXAMINATION:  XR WRIST COMPLETE 3 VIEWS RIGHT    CLINICAL HISTORY:  XR WRIST COMPLETE 3 VIEWS RIGHTPain in unspecified wrist    COMPARISON:  None    FINDINGS:  Three views of the right wrist were obtained.    No evidence of acute fracture or dislocation.  Mild diffuse osteopenia.  Mild soft tissue swelling.  Moderate degenerative changes at the 1st carpometacarpal joint.                               X-Ray Tibia Fibula 2 View Left (Final result)  Result time 05/08/20 14:17:17    Final result by Brody Solorzano III, MD (05/08/20 14:17:17)                 Impression:      No acute bony abnormality suggested involving the tibia or fibula.      Electronically signed by: Brody Solorzano MD  Date:    05/08/2020  Time:    14:17             Narrative:    EXAMINATION:  XR TIBIA FIBULA 2 VIEW LEFT    CLINICAL HISTORY:  Pain in leg, unspecified    COMPARISON:  None    FINDINGS:  No acute fracture.  No dislocation.  Postop changes noted about the ankle.                               X-Ray Shoulder Trauma Left (Final result)  Result time 05/08/20 14:15:50    Final result by Brody Willis MD (05/08/20  14:15:50)                 Impression:      No acute fracture or dislocation.      Electronically signed by: Brody Willis MD  Date:    05/08/2020  Time:    14:15             Narrative:    EXAMINATION:  XR SHOULDER TRAUMA 3 VIEW LEFT    CLINICAL HISTORY:  XR SHOULDER TRAUMA 3 VIEW LEFTPain in leg, unspecified    COMPARISON:  None    FINDINGS:  Three views of the left shoulder were obtained.    No evidence of acute fracture or dislocation.  Soft tissues are unremarkable. Moderate AC joint degenerative changes.  Mild diffuse osteopenia.  Visualized lungs are clear.  Aorta demonstrates atherosclerotic disease.                                                                 Clinical Impression:       ICD-10-CM ICD-9-CM   1. Contusion of right wrist, initial encounter S60.211A 923.21   2. Wrist pain M25.539 719.43   3. Leg pain M79.606 729.5   4. Abrasion of right wrist, initial encounter S60.811A 913.0   5. Contusion of left shoulder, initial encounter S40.012A 923.00             ED Disposition Condition    Discharge Stable        ED Prescriptions     None        Follow-up Information     Follow up With Specialties Details Why Contact Info    E. Brody Chaudhari Jr., MD Internal Medicine, Pediatrics Schedule an appointment as soon as possible for a visit in 1 week  [address]  [place] [address]  [phone]                                       Aron Gonzalez NP  05/08/20 1445       Aron Gonzalez NP  05/08/20 1445

## 2020-05-08 NOTE — TELEPHONE ENCOUNTER
S/w pt's brother Rm reporting pt fell out of wheelchair yesterday with abrasion to Rt arm and tore skin from Rt hand, now with swelling and pain. Advised brother d/t pt fall and injuries, pt needs to be taken to nearest ER for eval and tx, and once pt seen in hosp pt/brother can call nxt wk for hosp f/u. Brother voiced understanding and confirmed ER instrx.

## 2020-05-13 ENCOUNTER — TELEPHONE (OUTPATIENT)
Dept: PAIN MEDICINE | Facility: CLINIC | Age: 75
End: 2020-05-13

## 2020-05-13 NOTE — TELEPHONE ENCOUNTER
Could not leave voice mail for Rm as voice mail is full at this time Dr. Dias would like for pt to be seen in clinic before he is able to proceed with procedure //dp

## 2020-05-22 DIAGNOSIS — I25.118 CORONARY ARTERY DISEASE OF NATIVE ARTERY OF NATIVE HEART WITH STABLE ANGINA PECTORIS: ICD-10-CM

## 2020-05-22 RX ORDER — ESLICARBAZEPINE ACETATE 600 MG/1
TABLET ORAL
Qty: 30 TABLET | Refills: 11 | Status: SHIPPED | OUTPATIENT
Start: 2020-05-22 | End: 2021-03-01 | Stop reason: SDUPTHER

## 2020-05-22 RX ORDER — RANOLAZINE 1000 MG/1
1000 TABLET, EXTENDED RELEASE ORAL 2 TIMES DAILY
Qty: 60 TABLET | Refills: 6 | Status: SHIPPED | OUTPATIENT
Start: 2020-05-22 | End: 2020-10-15

## 2020-05-25 ENCOUNTER — PATIENT MESSAGE (OUTPATIENT)
Dept: INTERNAL MEDICINE | Facility: CLINIC | Age: 75
End: 2020-05-25

## 2020-05-29 ENCOUNTER — TELEPHONE (OUTPATIENT)
Dept: PULMONOLOGY | Facility: CLINIC | Age: 75
End: 2020-05-29

## 2020-05-29 NOTE — TELEPHONE ENCOUNTER
----- Message from Shayy Martinez sent at 5/29/2020  2:57 PM CDT -----  Contact: pt brother / Rm   Caller request call back regarding pts breathing treatment ... Call back : [phone] (home)

## 2020-06-01 ENCOUNTER — TELEPHONE (OUTPATIENT)
Dept: PAIN MEDICINE | Facility: CLINIC | Age: 75
End: 2020-06-01

## 2020-06-01 NOTE — TELEPHONE ENCOUNTER
----- Message from Mehul Blount sent at 6/1/2020  4:35 PM CDT -----  Contact: pt brother Alek Abdalla   Is calling to discuss some procedures that need to be done and can be reached at [phone]//fer/dbw

## 2020-06-02 ENCOUNTER — PATIENT OUTREACH (OUTPATIENT)
Dept: ADMINISTRATIVE | Facility: OTHER | Age: 75
End: 2020-06-02

## 2020-06-02 ENCOUNTER — TELEPHONE (OUTPATIENT)
Dept: PAIN MEDICINE | Facility: CLINIC | Age: 75
End: 2020-06-02

## 2020-06-02 NOTE — TELEPHONE ENCOUNTER
----- Message from Mely Claudio sent at 6/2/2020  1:44 PM CDT -----  Contact: Brother Mr Abdalla- [phone]  Would like to consult with the nurse,  Brother is Returning the nurse call, please call back at [phone], thanks sj  .Type:  Patient Returning Call    Who Called Mr Abdalla  Who Left Message for Patient:Elizabeth  Does the patient know what this is regarding?: no  Would the patient rather a call back or a response via [facility]? callback  Best Call Back Number: [phone]  Additional Information:

## 2020-06-03 ENCOUNTER — OFFICE VISIT (OUTPATIENT)
Dept: INTERNAL MEDICINE | Facility: CLINIC | Age: 75
End: 2020-06-03
Payer: MEDICARE

## 2020-06-03 ENCOUNTER — OFFICE VISIT (OUTPATIENT)
Dept: PAIN MEDICINE | Facility: CLINIC | Age: 75
End: 2020-06-03
Payer: MEDICARE

## 2020-06-03 VITALS
DIASTOLIC BLOOD PRESSURE: 78 MMHG | WEIGHT: 131 LBS | SYSTOLIC BLOOD PRESSURE: 130 MMHG | HEART RATE: 69 BPM | HEIGHT: 64 IN | RESPIRATION RATE: 18 BRPM | BODY MASS INDEX: 22.36 KG/M2

## 2020-06-03 VITALS
HEART RATE: 69 BPM | WEIGHT: 131.19 LBS | OXYGEN SATURATION: 97 % | SYSTOLIC BLOOD PRESSURE: 138 MMHG | RESPIRATION RATE: 16 BRPM | DIASTOLIC BLOOD PRESSURE: 78 MMHG | BODY MASS INDEX: 22.4 KG/M2 | HEIGHT: 64 IN | TEMPERATURE: 98 F

## 2020-06-03 DIAGNOSIS — M48.062 SPINAL STENOSIS OF LUMBAR REGION WITH NEUROGENIC CLAUDICATION: Primary | ICD-10-CM

## 2020-06-03 DIAGNOSIS — I25.118 CORONARY ARTERY DISEASE OF NATIVE ARTERY OF NATIVE HEART WITH STABLE ANGINA PECTORIS: ICD-10-CM

## 2020-06-03 DIAGNOSIS — Z86.73 HX OF COMPLETED STROKE: ICD-10-CM

## 2020-06-03 DIAGNOSIS — R29.6 RECURRENT FALLS: ICD-10-CM

## 2020-06-03 DIAGNOSIS — Z85.828 ENCOUNTER FOR FOLLOW-UP SURVEILLANCE OF SKIN CANCER: ICD-10-CM

## 2020-06-03 DIAGNOSIS — R56.9 FOCAL SEIZURES: ICD-10-CM

## 2020-06-03 DIAGNOSIS — J41.1 MUCOPURULENT CHRONIC BRONCHITIS: Chronic | ICD-10-CM

## 2020-06-03 DIAGNOSIS — R26.9 GAIT DISTURBANCE: ICD-10-CM

## 2020-06-03 DIAGNOSIS — M19.019 SHOULDER ARTHRITIS: ICD-10-CM

## 2020-06-03 DIAGNOSIS — Z08 ENCOUNTER FOR FOLLOW-UP SURVEILLANCE OF SKIN CANCER: ICD-10-CM

## 2020-06-03 DIAGNOSIS — K21.9 GASTROESOPHAGEAL REFLUX DISEASE, ESOPHAGITIS PRESENCE NOT SPECIFIED: ICD-10-CM

## 2020-06-03 DIAGNOSIS — M54.16 LUMBAR RADICULOPATHY: Primary | ICD-10-CM

## 2020-06-03 DIAGNOSIS — I10 ESSENTIAL HYPERTENSION: ICD-10-CM

## 2020-06-03 DIAGNOSIS — N18.30 CONTROLLED TYPE 2 DIABETES MELLITUS WITH STAGE 3 CHRONIC KIDNEY DISEASE, WITHOUT LONG-TERM CURRENT USE OF INSULIN: ICD-10-CM

## 2020-06-03 DIAGNOSIS — E11.22 CONTROLLED TYPE 2 DIABETES MELLITUS WITH STAGE 3 CHRONIC KIDNEY DISEASE, WITHOUT LONG-TERM CURRENT USE OF INSULIN: ICD-10-CM

## 2020-06-03 PROCEDURE — 3078F DIAST BP <80 MM HG: CPT | Mod: HCNC,CPTII,S$GLB, | Performed by: PAIN MEDICINE

## 2020-06-03 PROCEDURE — 3075F SYST BP GE 130 - 139MM HG: CPT | Mod: HCNC,CPTII,S$GLB, | Performed by: PEDIATRICS

## 2020-06-03 PROCEDURE — 99214 PR OFFICE/OUTPT VISIT, EST, LEVL IV, 30-39 MIN: ICD-10-PCS | Mod: HCNC,S$GLB,, | Performed by: PAIN MEDICINE

## 2020-06-03 PROCEDURE — 1125F AMNT PAIN NOTED PAIN PRSNT: CPT | Mod: HCNC,S$GLB,, | Performed by: PEDIATRICS

## 2020-06-03 PROCEDURE — 3078F DIAST BP <80 MM HG: CPT | Mod: HCNC,CPTII,S$GLB, | Performed by: PEDIATRICS

## 2020-06-03 PROCEDURE — 1159F MED LIST DOCD IN RCRD: CPT | Mod: HCNC,S$GLB,, | Performed by: PEDIATRICS

## 2020-06-03 PROCEDURE — 99214 OFFICE O/P EST MOD 30 MIN: CPT | Mod: HCNC,S$GLB,, | Performed by: PEDIATRICS

## 2020-06-03 PROCEDURE — 99999 PR PBB SHADOW E&M-EST. PATIENT-LVL IV: ICD-10-PCS | Mod: PBBFAC,HCNC,, | Performed by: PAIN MEDICINE

## 2020-06-03 PROCEDURE — 99999 PR PBB SHADOW E&M-EST. PATIENT-LVL IV: ICD-10-PCS | Mod: PBBFAC,HCNC,, | Performed by: PEDIATRICS

## 2020-06-03 PROCEDURE — 3075F PR MOST RECENT SYSTOLIC BLOOD PRESS GE 130-139MM HG: ICD-10-PCS | Mod: HCNC,CPTII,S$GLB, | Performed by: PAIN MEDICINE

## 2020-06-03 PROCEDURE — 1101F PT FALLS ASSESS-DOCD LE1/YR: CPT | Mod: HCNC,CPTII,S$GLB, | Performed by: PEDIATRICS

## 2020-06-03 PROCEDURE — 3078F PR MOST RECENT DIASTOLIC BLOOD PRESSURE < 80 MM HG: ICD-10-PCS | Mod: HCNC,CPTII,S$GLB, | Performed by: PAIN MEDICINE

## 2020-06-03 PROCEDURE — 1101F PT FALLS ASSESS-DOCD LE1/YR: CPT | Mod: HCNC,CPTII,S$GLB, | Performed by: PAIN MEDICINE

## 2020-06-03 PROCEDURE — 1159F PR MEDICATION LIST DOCUMENTED IN MEDICAL RECORD: ICD-10-PCS | Mod: HCNC,S$GLB,, | Performed by: PEDIATRICS

## 2020-06-03 PROCEDURE — 99214 PR OFFICE/OUTPT VISIT, EST, LEVL IV, 30-39 MIN: ICD-10-PCS | Mod: HCNC,S$GLB,, | Performed by: PEDIATRICS

## 2020-06-03 PROCEDURE — 3075F SYST BP GE 130 - 139MM HG: CPT | Mod: HCNC,CPTII,S$GLB, | Performed by: PAIN MEDICINE

## 2020-06-03 PROCEDURE — 99214 OFFICE O/P EST MOD 30 MIN: CPT | Mod: HCNC,S$GLB,, | Performed by: PAIN MEDICINE

## 2020-06-03 PROCEDURE — 1125F PR PAIN SEVERITY QUANTIFIED, PAIN PRESENT: ICD-10-PCS | Mod: HCNC,S$GLB,, | Performed by: PEDIATRICS

## 2020-06-03 PROCEDURE — 3044F HG A1C LEVEL LT 7.0%: CPT | Mod: HCNC,CPTII,S$GLB, | Performed by: PEDIATRICS

## 2020-06-03 PROCEDURE — 99999 PR PBB SHADOW E&M-EST. PATIENT-LVL IV: CPT | Mod: PBBFAC,HCNC,, | Performed by: PEDIATRICS

## 2020-06-03 PROCEDURE — 99999 PR PBB SHADOW E&M-EST. PATIENT-LVL IV: CPT | Mod: PBBFAC,HCNC,, | Performed by: PAIN MEDICINE

## 2020-06-03 PROCEDURE — 3044F PR MOST RECENT HEMOGLOBIN A1C LEVEL <7.0%: ICD-10-PCS | Mod: HCNC,CPTII,S$GLB, | Performed by: PEDIATRICS

## 2020-06-03 PROCEDURE — 1101F PR PT FALLS ASSESS DOC 0-1 FALLS W/OUT INJ PAST YR: ICD-10-PCS | Mod: HCNC,CPTII,S$GLB, | Performed by: PEDIATRICS

## 2020-06-03 PROCEDURE — 3078F PR MOST RECENT DIASTOLIC BLOOD PRESSURE < 80 MM HG: ICD-10-PCS | Mod: HCNC,CPTII,S$GLB, | Performed by: PEDIATRICS

## 2020-06-03 PROCEDURE — 1101F PR PT FALLS ASSESS DOC 0-1 FALLS W/OUT INJ PAST YR: ICD-10-PCS | Mod: HCNC,CPTII,S$GLB, | Performed by: PAIN MEDICINE

## 2020-06-03 PROCEDURE — 1125F AMNT PAIN NOTED PAIN PRSNT: CPT | Mod: HCNC,S$GLB,, | Performed by: PAIN MEDICINE

## 2020-06-03 PROCEDURE — 1159F PR MEDICATION LIST DOCUMENTED IN MEDICAL RECORD: ICD-10-PCS | Mod: HCNC,S$GLB,, | Performed by: PAIN MEDICINE

## 2020-06-03 PROCEDURE — 1125F PR PAIN SEVERITY QUANTIFIED, PAIN PRESENT: ICD-10-PCS | Mod: HCNC,S$GLB,, | Performed by: PAIN MEDICINE

## 2020-06-03 PROCEDURE — 1159F MED LIST DOCD IN RCRD: CPT | Mod: HCNC,S$GLB,, | Performed by: PAIN MEDICINE

## 2020-06-03 PROCEDURE — 3075F PR MOST RECENT SYSTOLIC BLOOD PRESS GE 130-139MM HG: ICD-10-PCS | Mod: HCNC,CPTII,S$GLB, | Performed by: PEDIATRICS

## 2020-06-03 RX ORDER — METHYLPREDNISOLONE 4 MG/1
TABLET ORAL
Qty: 1 PACKAGE | Refills: 0 | Status: SHIPPED | OUTPATIENT
Start: 2020-06-03 | End: 2020-07-24 | Stop reason: SDUPTHER

## 2020-06-03 RX ORDER — ATORVASTATIN CALCIUM 40 MG/1
40 TABLET, FILM COATED ORAL DAILY
Qty: 90 TABLET | Refills: 3 | Status: SHIPPED | OUTPATIENT
Start: 2020-06-03 | End: 2021-05-21 | Stop reason: SDUPTHER

## 2020-06-03 NOTE — PROGRESS NOTES
Chief Pain Complaint:  Low back pain  Right shoulder pain    Interval HPI:  Ms. Felix returns to clinic for follow-up.  She underwent a T12-L1 transforaminal epidural steroid injection on March 10, 2020 and she reports that this has provided significant pain relief for her which radiated into her right leg prior to the injection.  She does report that his symptoms have returned and the injections worn off her pain is currently rated 7/10.  She was scheduled to undergo bilateral lumbar radiofrequency ablation prior to corona virus however she would like to hold off on that at this time and pursue repeat injection in the low back.  Unfortunately she also reports that she had her wheelchair fall over a few weeks ago and this has resulted in bilateral shoulder pain.  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  She finds that rest does provide some pain relief on activity causes her symptoms to worsen.  She has been able to walk significantly more after her most recent injection in March reported she is able to walk several steps before having to sit rest however this has a vast improvement from prior to the injection.    Interval HPI:  Ms. Felix returns to clinic for follow-up.  She underwent bilateral lumbar medial branch blocks on January 31, 2020 and reports 100% symptomatic pain relief for approximately 1 week and his symptoms slowly began to return.  She continues to have some right-sided lower thoracic posterior pain which was not completely dressed with the medial branch blocks.  Today she rates her pain as an 8/10 which is located primarily in the axial lumbar spine and the lower right posterior thoracic region.  She denies having numbness weakness in the legs but does continue to have a constant aching and throbbing sensation in the low back.    Interval HPI:   Ms. Felix returns to clinic for follow-up.  She  reports that she underwent bilateral L4/5 transforaminal epidural steroid injections in November 2019 reports ongoing 80% symptomatic pain relief.  She has also been participating physical therapy which she has found to be very helpful however 4 days ago she started to do some leg raise is in her bed and she felt severe pain in her low back which has not subsided.  She denies having any radiation except for around her flank into her anterior abdomen.  She feels like the pain sometimes comes straight through from her back to her abdomen.  Today she rates her pain as a 10/10 describes it as a constant aching and sharp pain. She has been using a heating pad which is minimally helpful and she has been holding off on physical therapy at this time. She denies having bowel bladder difficulty.    Interval History: Patient was seen on 8/4/19. At that time she underwent bilateral L3/4 TF XANDER.  The patient reports that she is/was better following the procedure.  she reports 75% pain relief.  She had a fall, then pain increased. She is currently living at [facility]. She is doing at home therapy there, which is helping. She is in wheelchair now but hopes to transition to walker soon.  After the fall, she was not able to get out of the bed.     Initial History of Present Illness:   This patient is a 74 y.o. female who presents today complaining of the above noted pain/s. The patient describes the pain as follows.  Ms. Feilx is a new patient clinic with complaints of generalized pain specifically in her left lower extremity and in the left superior aspect of her face secondary to shingles.  She reports approximately 2 years ago she had to sudden deaths in the family which caused very stressful time for her and resulted in shingles rash in the left V1 distribution however she reports having excruciating pain in the left V1 and V2 distributions.  She denies having difficulty with eating food and brushing her teeth on the left side  of her face however the left lateral side of her nose gets her excruciating pain.  She has been tried on numerous medications in the past including gabapentin, Lyrica, Valtrex, Celebrex, ibuprofen which have all provided minimal benefit however steroids have been most helpful.  Today she rates her pain as an 8/10 and describes a constant burning sensation the left side of her face in addition to radiation into her bilateral lower extremities, her right shoulder, her left upper extremity occasionally as a pins and needle sensation.  She does report having numbness and weakness in her left lower extremity.  She has been physical therapy which she completed in February of 2019 however this caused most of her low back and leg symptoms to worsen in addition to her post herpetic neuralgia to worsen.  She denies having bowel bladder difficulties.  Her symptoms are worse with activity such as walking in her somewhat improved with rest however she had finds it difficult to get into a comfortable position. She has been using topical lidocaine patches and applying to the left side of her face which does provide significant benefit.  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    Previous Therapy:  Medications:  Celebrex, ibuprofen, gabapentin, Lyrica, Valtrex, steroids  Injections: bilateral L3/4 TF XANDER on 8/4/19 with 75% pain relief, bilateral L4/5 transforaminal epidural steroid injections with 80% symptomatic pain relief, bilateral L3-5 lumbar medial branch blocks with 100% symptomatic pain relief, T12-L1 transforaminal epidural steroid injection with 100% symptomatic pain relief  Surgeries: None  Physical Therapy: Completed in the Past, currently participating    Past Surgical History:   Procedure Laterality Date    BRAIN SURGERY      CARDIAC CATHETERIZATION      CORONARY ANGIOPLASTY       EPIDURAL STEROID INJECTION INTO LUMBAR SPINE Bilateral 11/12/2019    Procedure: TF XANDER L4/5;  Surgeon: Desean Dias MD;  Location: [facility] [place] [facility];  Service: Pain Management;  Laterality: Bilateral;    HYSTERECTOMY      INJECTION OF ANESTHETIC AGENT AROUND MEDIAL BRANCH NERVES INNERVATING LUMBAR FACET JOINT Bilateral 1/31/2020    Procedure: Bilateral L3-5 MBB;  Surgeon: Desean Dias MD;  Location: [facility] [place] [facility];  Service: Pain Management;  Laterality: Bilateral;    TRANSFORAMINAL EPIDURAL INJECTION OF STEROID Bilateral 7/23/2019    Procedure: Bilateral L3/4 Transforaminal Epidural Steroid Injection;  Surgeon: Desean Dias MD;  Location: [facility] [place] [facility];  Service: Pain Management;  Laterality: Bilateral;    TRANSFORAMINAL EPIDURAL INJECTION OF STEROID Bilateral 3/10/2020    Procedure: Right T12/L1 TF XANDER with local;  Surgeon: Desean Dias MD;  Location: [facility] [place] [facility];  Service: Pain Management;  Laterality: Bilateral;       Imaging / Labs / Studies (reviewed on 6/3/2020):    Results for orders placed during the hospital encounter of 08/04/19   CT Lumbar Spine Without Contrast    Narrative FINDINGS:  Wedge compression deformity of T12 consistent with an old fracture.  No bowing of the posterior cortex.Gas formation within the disc at the L2-3 L3-4 L4-5 and L5-S1 levels.Bilateral facet arthropathy and neural foraminal narrowing seen most significantly at the L3-4 L4-5 and L5-S1 levels with possible bilateral L3-L4 and L5 nerve root impingement in the neural foramina.  13 degree lumbar scoliotic curvature convex to the left side.    Impression Mild lumbar scoliotic curvature convex to the left side with multilevel disc space narrowing seen most significantly at the L3-4 L4-5 and L5-S1 levels.  Bony neural foraminal narrowing with possible nerve root impingement seen most significantly at the L3-L4 level on the right side with possible right-sided L3 nerve root impingement and on the left side at the  L4-5 and L5-S1 levels with possible left-sided L4 and L5 nerve root impingement.  Old compression fracture of the T12 vertebral body.  Please see the prior myelogram dated 07/12/2019 showing almost complete block at the L3-L4 level.  All CT scans at this facility are performed  using dose modulation techniques as appropriate to performed exam including the following:  automated exposure control; adjustment of mA and/or kV according to the patients size (this includes techniques or standardized protocols for targeted exams where dose is matched to indication/reason for exam: i.e. extremities or head);  iterative reconstruction technique.     Results for orders placed during the hospital encounter of 08/04/19   CT Thoracic Spine Without Contrast    Narrative COMPARISON:  None  FINDINGS:  Normal vertebral alignment.  Multilevel degenerative changes of the vertebral endplates seen most significantly at the T7-T8-T8-T9 T9-T10 T10-T11 and the T11-T12 vertebra.  No evidence of an acute compression fracture can be seen.  Cannot exclude an old mild wedge compression deformity of the T12 vertebral body.  Multilevel facet arthropathy.  Multilevel disc space narrowings between T4 and T10. Atherosclerotic calcifications of the abdominal aorta without aneurysmal dilatation.  Multiple subpleural blebs present in the adjacent lungs.    Impression Multilevel degenerative changes as described above.  Mild wedge deformity of the T12 vertebral body consistent with an old compression fracture.  No acute fracture identified.  The patient has had a prior myelogram dated 07/12/2019 showing multiple impingement on the ventral thecal sac secondary to disc disease.  T12 vertebra is similar on the prior study.  All CT scans at this facility are performed  using dose modulation techniques as appropriate to performed exam including the following:  automated exposure control; adjustment of mA and/or kV according to the patients size (this  includes techniques or standardized protocols for targeted exams where dose is matched to indication/reason for exam: i.e. extremities or head);  iterative reconstruction technique.       Results for orders placed during the hospital encounter of 07/12/19   X-Ray Hips Bilateral 2 View Incl AP Pelvis    Narrative FINDINGS:  Degenerative changes of the lumbosacral spine and sacroiliac joints.  Bones are demineralized.  Bilateral total hip arthroplasty changes are noted.  No periprosthetic fracture or hardware complication.     Results for orders placed during the hospital encounter of 08/04/19   X-Ray Hip 2 View Left    Narrative COMPARISON:  07/31/2019  FINDINGS:  Bilateral hip replacements are intact in good alignment.  Left hip appears free of any acute fracture or dislocation.     Results for orders placed during the hospital encounter of 07/31/19   CT Cervical Spine Without Contrast    Narrative FINDINGS:  Osteopenia.  Grade 2 degenerative spondylolisthesis at C3-C4.  Grade 1 degenerative spondylolisthesis at C4-C5.  Vertebral body height is normal.  No acute fractures. No prevertebral soft tissue swelling. Atlanto-occipital articulation is normal.  Congenitally patulous spinal canal.  No significant spinal stenosis.  Moderate left foraminal stenosis at C3-C4.    Impression No acute findings.  Degenerative spondylolisthesis at C3-C4 and C4-C5.  All CT scans at this facility use dose modulation, iterative reconstruction, and/or weight base dosing when appropriate to reduce radiation dose to as low as reasonably achievable.     Results for orders placed during the hospital encounter of 07/12/19   Fl Myelogram 2 Or More Regions    Narrative TECHNIQUE:  After obtaining informed consent from the patient explain the risks, benefits and alternatives to the procedure the patient was placed on the fluoroscopic table in the prone position. The risks included although not limited to bleeding, infection, contrast/drug  reaction, nerve root/cord injury,  CSF leak/spinal headache, seizures and herniation. The L2/3 level within isolated under fluoroscopy. The overlying skin was prepped and draped routine sterile fashion. Approximately 1 cc of a 1% lidocaine was used for local anesthesia. Using a 22-gauge spinal needle access was obtained to the thecal sac be a midline translaminar approach. Following removal of stylet spontaneous visualization of clear cerebrospinal fluid was identified. Following this contrast system was attached and approximately 10 cc of a Omnipaque 300 contrast material was infused under fluoroscopy. Patient tolerated procedure well. No immediate postprocedure complication. The stylet was then reinserted and the  spinal needle system was removed in full. Adhesive bandage was placed over the puncture site. Spot overhead fluoroscopic images in the and lateral projection with additional lateral flexion and extension views.  Total fluoro time was 2.1 minutes and 1 fluoroscopic image was obtained.  COMPARISON:  06/04/2019  FINDINGS:  No complications.  Limited images due to limited mobility.    Impression Successful fluoroscopic myelogram. See dedicated CT myelogram thoracic and lumbar spine.     Results for orders placed during the hospital encounter of 07/12/19   CT Myelography Thoracic Lumbar Spine    Narrative COMPARISON:  None  FINDINGS:  Thoracic spine:  There is no acute fracture of the thoracic spine.  There is an old anterior wedge compression fracture of the T12 vertebral body.  There is a Schmorl's node seen within the superior aspect of the T8 vertebral body.  There is a slight levoscoliosis of the thoracic spine centered at T3.  T1-T2: Unremarkable.  T2-T3: Unremarkable.  T3-T4: There is a disc bulge with left ventral surface thecal sac effacement but no impingement upon the cord.  T4-T5: Unremarkable.  T5-T6: Unremarkable.  T6-T7: There is a central focal disc protrusion which causes right ventral  surface cord impingement.  The disc protrusion measures 7 mm in AP dimension, 14 mm in craniocaudal dimension, and 10 mm in transverse dimension.  T7-T8: Unremarkable.  T8-T9: There is a central disc extrusion with the disc extrusion extending inferiorly from the level of the disc and measuring 19 mm in craniocaudal dimension, 8 mm in transverse dimension, and 5 mm in anterior-posterior dimension.  This disc extrusion slightly impinges upon the ventral surface of the cord.  T9-T10: Unremarkable.  T10-T11: There is a disc extrusion in the central to right central location which is extending superiorly from the disc level and measuring 18 mm in craniocaudal dimension, 5 mm in anterior-posterior dimension, and 8 mm in transverse dimension.  This disc extrusion impinges upon the right ventral surface of the cord.  T11-T12: Minimal disc bulge with ventral surface thecal sac effacement but no cord impingement.  T12-L1: Right central disc protrusion measuring 10 mm in craniocaudal dimension, 8 mm in transverse dimension, and 5 mm in anterior-posterior dimension which causes slight right ventral surface cord remodeling.    Lumbar spine:  No fracture of the lumbar spine.  No paraspinal mass.  The conus medullaris has a normal morphology and terminates at the L1-L2 level.  L1-L2: Mild disc bulge with mild ventral surface thecal sac effacement.  Patent neural foramina.  L2-L3: Degenerative disc disease.  Disc bulge with severe central canal stenosis with the AP diameter at the point of maximal stenosis measuring 7 mm.  Mild bilateral neural foraminal narrowing.  Bilateral facet joint osteoarthritis.  L3-L4: Severe degenerative disc disease.  There is critical central canal stenosis at this level with no contrast-enhanced CSF identified at the disc level.  There is severe crowding of the nerve roots in the compressed thecal sac at this level.  The disc protrusion extends into the right neural foramen causing severe right neural  foraminal stenosis and probable impingement upon the exiting right L3 nerve root.  L4-5: Severe degenerative disc disease.  Disc bulge along with ligamentum flavum thickening which causes severe central canal stenosis with the AP diameter of the thecal sac measuring 8 mm the point of maximal stenosis.  The disc protrusion extends into the bilateral neural foramen causing severe right neural foraminal stenosis and moderate left neural foraminal stenosis.  L5-S1: Degenerative disc disease.  Disc bulge with mild central canal narrowing.  Mild to moderate left neural foraminal stenosis.    Impression 1. There is critical central canal stenosis at L3-L4 due to a disc bulge and ligamentum flavum thickening.  2. Severe right L3-L4 neural foraminal stenosis with probable impingement upon the exiting right L3 nerve root.  3. Severe central canal stenosis at L2-L3 and L4-L5 due to disc bulges.  4. Severe right L4-5 neural foraminal stenosis.  5. Multiple disc protrusions in the thoracic spine as detailed each level with some ventral surface cord impingement seen at the T6-T7, T8-T9, and T10-T11 levels.  All CT scans at this facility are performed  using dose modulation techniques as appropriate to performed exam including the following:  automated exposure control; adjustment of mA and/or kV according to the patients size (this includes techniques or standardized protocols for targeted exams where dose is matched to indication/reason for exam: i.e. extremities or head);  iterative reconstruction technique.         Review of Systems:  Review of Systems   Constitutional: Negative for fever.   HENT:        Left-sided rash of the face   Eyes: Negative for blurred vision.   Respiratory: Negative for cough and wheezing.    Cardiovascular: Negative for chest pain and orthopnea.   Gastrointestinal: Negative for constipation, diarrhea, nausea and vomiting.   Genitourinary: Negative for dysuria.   Musculoskeletal: Positive for back  "pain.   Skin: Negative for itching and rash.   Neurological: Positive for weakness.   Endo/Heme/Allergies: Does not bruise/bleed easily.       Physical Exam:  Vitals:  /78 (BP Location: Right arm, Patient Position: Sitting, BP Method: Medium (Automatic))   Pulse 69   Resp 18   Ht 5' 4" (1.626 m)   Wt 59.4 kg (131 lb)   LMP  (LMP Unknown)   BMI 22.49 kg/m²   (reviewed on 6/3/2020)    General: alert and oriented, in no apparent distress.  Gait: in wheelchair.  Skin: no rashes, no discoloration, no obvious lesions  HEENT: normocephalic, atraumatic. Pupils equal and round.  Cardiovascular: no significant peripheral edema present.  Respiratory: without use of accessory muscles of respiration.    Musculoskeletal - Lumbar Spine:  -severe tenderness to palpation over lower lumbar vertebral body midline and facets bilaterally and; severe tenderness to palpation over right lower thoracic spine and flank    Shoulders; increased pain with all range of motion of bilateral shoulders;    Neuro - Lower Extremities:  - Extremity Reflexes: Brisk and symmetric throughout  - Sensory: Sensation to light touch intact bilaterally      Psych:  Mood and affect is appropriate    Assessment  1. 74 y.o. year old patient with PMH of   Past Medical History:   Diagnosis Date    Abnormal ECG 8/5/2019    Aneurysm     Anticoagulant long-term use     Arthritis     CAD in native artery 8/5/2019    COPD (chronic obstructive pulmonary disease)     Diabetes mellitus     Glaucoma     Hypertension     Seizures     Shingles 05/27/2017    Stroke       presenting with pain located left side of her face, left lower extremity, lumbar spine. Diagnoses include:    ICD-10-CM ICD-9-CM   1. Lumbar radiculopathy M54.16 724.4   2. Shoulder arthritis M19.019 716.91      2. Pain Generators / Etiology: Lumbar Radiculopathy and Lumbar Spondylosis, post herpetic neuralgia  3. Failed Meds (E- Effective, NE- Not Effective):  Celebrex-minimally " effective, ibuprofen-minimally effective, gabapentin-minimally effective, Lyrica-minimally effective, Valtrex-minimally effective, steroid-effective  4. Physical Therapy - Completed in the Past  5. Psychological comorbidities - None  6. Anticoagulants / Antiplatelets: Plavix      Plan:  1. Interventional:   - schedule for right T12/L1 transforaminal epidural steroid injection; will then plan for bilateral intra-articular shoulder joint injections; will then plan for bilateral lumbar radiofrequency ablation with the right side taking place 1st and then the left side     2. Pharmacologic: Continue Cymbalta 30 mg once daily PRN.  Continue tramadol as prescribed; will provide Medrol Dosepak prescription to help with current symptoms    3. Rehabilitative:  Can continue physical therapy exercises at home as tolerated    4. Diagnostic:  None; reviewed CT scan thoracic and lumbar spine patient today in clinic    5. Follow up:  Follow up in clinic 4 weeks after the injection    - This condition does not require this patient to take time off of work.   - I discussed the risks, benefits, and alternatives to potential treatment options. All questions and concerns were fully addressed today in clinic. Dr. Dias was consulted regarding the patient plan and agrees.    SCOUT Dias MD  Interventional Pain Medicine  [facility] [place]

## 2020-06-03 NOTE — H&P (VIEW-ONLY)
Chief Pain Complaint:  Low back pain  Right shoulder pain    Interval HPI:  Ms. Felix returns to clinic for follow-up.  She underwent a T12-L1 transforaminal epidural steroid injection on March 10, 2020 and she reports that this has provided significant pain relief for her which radiated into her right leg prior to the injection.  She does report that his symptoms have returned and the injections worn off her pain is currently rated 7/10.  She was scheduled to undergo bilateral lumbar radiofrequency ablation prior to corona virus however she would like to hold off on that at this time and pursue repeat injection in the low back.  Unfortunately she also reports that she had her wheelchair fall over a few weeks ago and this has resulted in bilateral shoulder pain.  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  She finds that rest does provide some pain relief on activity causes her symptoms to worsen.  She has been able to walk significantly more after her most recent injection in March reported she is able to walk several steps before having to sit rest however this has a vast improvement from prior to the injection.    Interval HPI:  Ms. Felix returns to clinic for follow-up.  She underwent bilateral lumbar medial branch blocks on January 31, 2020 and reports 100% symptomatic pain relief for approximately 1 week and his symptoms slowly began to return.  She continues to have some right-sided lower thoracic posterior pain which was not completely dressed with the medial branch blocks.  Today she rates her pain as an 8/10 which is located primarily in the axial lumbar spine and the lower right posterior thoracic region.  She denies having numbness weakness in the legs but does continue to have a constant aching and throbbing sensation in the low back.    Interval HPI:   Ms. Felix returns to clinic for follow-up.  She  reports that she underwent bilateral L4/5 transforaminal epidural steroid injections in November 2019 reports ongoing 80% symptomatic pain relief.  She has also been participating physical therapy which she has found to be very helpful however 4 days ago she started to do some leg raise is in her bed and she felt severe pain in her low back which has not subsided.  She denies having any radiation except for around her flank into her anterior abdomen.  She feels like the pain sometimes comes straight through from her back to her abdomen.  Today she rates her pain as a 10/10 describes it as a constant aching and sharp pain. She has been using a heating pad which is minimally helpful and she has been holding off on physical therapy at this time. She denies having bowel bladder difficulty.    Interval History: Patient was seen on 8/4/19. At that time she underwent bilateral L3/4 TF XANDER.  The patient reports that she is/was better following the procedure.  she reports 75% pain relief.  She had a fall, then pain increased. She is currently living at [facility]. She is doing at home therapy there, which is helping. She is in wheelchair now but hopes to transition to walker soon.  After the fall, she was not able to get out of the bed.     Initial History of Present Illness:   This patient is a 74 y.o. female who presents today complaining of the above noted pain/s. The patient describes the pain as follows.  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  She reports approximately 2 years ago she had to sudden deaths in the family which caused very stressful time for her and resulted in shingles rash in the left V1 distribution however she reports having excruciating pain in the left V1 and V2 distributions.  She denies having difficulty with eating food and brushing her teeth on the left side  of her face however the left lateral side of her nose gets her excruciating pain.  She has been tried on numerous medications in the past including gabapentin, Lyrica, Valtrex, Celebrex, ibuprofen which have all provided minimal benefit however steroids have been most helpful.  Today she rates her pain as an 8/10 and describes a constant burning sensation the left side of her face in addition to radiation into her bilateral lower extremities, her right shoulder, her left upper extremity occasionally as a pins and needle sensation.  She does report having numbness and weakness in her left lower extremity.  She has been physical therapy which she completed in February of 2019 however this caused most of her low back and leg symptoms to worsen in addition to her post herpetic neuralgia to worsen.  She denies having bowel bladder difficulties.  Her symptoms are worse with activity such as walking in her somewhat improved with rest however she had finds it difficult to get into a comfortable position. She has been using topical lidocaine patches and applying to the left side of her face which does provide significant benefit.  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    Previous Therapy:  Medications:  Celebrex, ibuprofen, gabapentin, Lyrica, Valtrex, steroids  Injections: bilateral L3/4 TF XANDER on 8/4/19 with 75% pain relief, bilateral L4/5 transforaminal epidural steroid injections with 80% symptomatic pain relief, bilateral L3-5 lumbar medial branch blocks with 100% symptomatic pain relief, T12-L1 transforaminal epidural steroid injection with 100% symptomatic pain relief  Surgeries: None  Physical Therapy: Completed in the Past, currently participating    Past Surgical History:   Procedure Laterality Date    BRAIN SURGERY      CARDIAC CATHETERIZATION      CORONARY ANGIOPLASTY       EPIDURAL STEROID INJECTION INTO LUMBAR SPINE Bilateral 11/12/2019    Procedure: TF XANDER L4/5;  Surgeon: Desean Dias MD;  Location: [facility];  Service: Pain Management;  Laterality: Bilateral;    HYSTERECTOMY      INJECTION OF ANESTHETIC AGENT AROUND MEDIAL BRANCH NERVES INNERVATING LUMBAR FACET JOINT Bilateral 1/31/2020    Procedure: Bilateral L3-5 MBB;  Surgeon: Desean Dias MD;  Location: [facility];  Service: Pain Management;  Laterality: Bilateral;    TRANSFORAMINAL EPIDURAL INJECTION OF STEROID Bilateral 7/23/2019    Procedure: Bilateral L3/4 Transforaminal Epidural Steroid Injection;  Surgeon: Desean Dias MD;  Location: [facility];  Service: Pain Management;  Laterality: Bilateral;    TRANSFORAMINAL EPIDURAL INJECTION OF STEROID Bilateral 3/10/2020    Procedure: Right T12/L1 TF XANDER with local;  Surgeon: Desean Dias MD;  Location: [facility];  Service: Pain Management;  Laterality: Bilateral;       Imaging / Labs / Studies (reviewed on 6/3/2020):    Results for orders placed during the hospital encounter of 08/04/19   CT Lumbar Spine Without Contrast    Narrative FINDINGS:  Wedge compression deformity of T12 consistent with an old fracture.  No bowing of the posterior cortex.Gas formation within the disc at the L2-3 L3-4 L4-5 and L5-S1 levels.Bilateral facet arthropathy and neural foraminal narrowing seen most significantly at the L3-4 L4-5 and L5-S1 levels with possible bilateral L3-L4 and L5 nerve root impingement in the neural foramina.  13 degree lumbar scoliotic curvature convex to the left side.    Impression Mild lumbar scoliotic curvature convex to the left side with multilevel disc space narrowing seen most significantly at the L3-4 L4-5 and L5-S1 levels.  Bony neural foraminal narrowing with possible nerve root impingement seen most significantly at the L3-L4 level on the right side with possible right-sided L3 nerve root impingement and on the left side at the  L4-5 and L5-S1 levels with possible left-sided L4 and L5 nerve root impingement.  Old compression fracture of the T12 vertebral body.  Please see the prior myelogram dated 07/12/2019 showing almost complete block at the L3-L4 level.  All CT scans at this facility are performed  using dose modulation techniques as appropriate to performed exam including the following:  automated exposure control; adjustment of mA and/or kV according to the patients size (this includes techniques or standardized protocols for targeted exams where dose is matched to indication/reason for exam: i.e. extremities or head);  iterative reconstruction technique.     Results for orders placed during the hospital encounter of 08/04/19   CT Thoracic Spine Without Contrast    Narrative COMPARISON:  None  FINDINGS:  Normal vertebral alignment.  Multilevel degenerative changes of the vertebral endplates seen most significantly at the T7-T8-T8-T9 T9-T10 T10-T11 and the T11-T12 vertebra.  No evidence of an acute compression fracture can be seen.  Cannot exclude an old mild wedge compression deformity of the T12 vertebral body.  Multilevel facet arthropathy.  Multilevel disc space narrowings between T4 and T10. Atherosclerotic calcifications of the abdominal aorta without aneurysmal dilatation.  Multiple subpleural blebs present in the adjacent lungs.    Impression Multilevel degenerative changes as described above.  Mild wedge deformity of the T12 vertebral body consistent with an old compression fracture.  No acute fracture identified.  The patient has had a prior myelogram dated 07/12/2019 showing multiple impingement on the ventral thecal sac secondary to disc disease.  T12 vertebra is similar on the prior study.  All CT scans at this facility are performed  using dose modulation techniques as appropriate to performed exam including the following:  automated exposure control; adjustment of mA and/or kV according to the patients size (this  includes techniques or standardized protocols for targeted exams where dose is matched to indication/reason for exam: i.e. extremities or head);  iterative reconstruction technique.       Results for orders placed during the hospital encounter of 07/12/19   X-Ray Hips Bilateral 2 View Incl AP Pelvis    Narrative FINDINGS:  Degenerative changes of the lumbosacral spine and sacroiliac joints.  Bones are demineralized.  Bilateral total hip arthroplasty changes are noted.  No periprosthetic fracture or hardware complication.     Results for orders placed during the hospital encounter of 08/04/19   X-Ray Hip 2 View Left    Narrative COMPARISON:  07/31/2019  FINDINGS:  Bilateral hip replacements are intact in good alignment.  Left hip appears free of any acute fracture or dislocation.     Results for orders placed during the hospital encounter of 07/31/19   CT Cervical Spine Without Contrast    Narrative FINDINGS:  Osteopenia.  Grade 2 degenerative spondylolisthesis at C3-C4.  Grade 1 degenerative spondylolisthesis at C4-C5.  Vertebral body height is normal.  No acute fractures. No prevertebral soft tissue swelling. Atlanto-occipital articulation is normal.  Congenitally patulous spinal canal.  No significant spinal stenosis.  Moderate left foraminal stenosis at C3-C4.    Impression No acute findings.  Degenerative spondylolisthesis at C3-C4 and C4-C5.  All CT scans at this facility use dose modulation, iterative reconstruction, and/or weight base dosing when appropriate to reduce radiation dose to as low as reasonably achievable.     Results for orders placed during the hospital encounter of 07/12/19   Fl Myelogram 2 Or More Regions    Narrative TECHNIQUE:  After obtaining informed consent from the patient explain the risks, benefits and alternatives to the procedure the patient was placed on the fluoroscopic table in the prone position. The risks included although not limited to bleeding, infection, contrast/drug  reaction, nerve root/cord injury,  CSF leak/spinal headache, seizures and herniation. The L2/3 level within isolated under fluoroscopy. The overlying skin was prepped and draped routine sterile fashion. Approximately 1 cc of a 1% lidocaine was used for local anesthesia. Using a 22-gauge spinal needle access was obtained to the thecal sac be a midline translaminar approach. Following removal of stylet spontaneous visualization of clear cerebrospinal fluid was identified. Following this contrast system was attached and approximately 10 cc of a Omnipaque 300 contrast material was infused under fluoroscopy. Patient tolerated procedure well. No immediate postprocedure complication. The stylet was then reinserted and the  spinal needle system was removed in full. Adhesive bandage was placed over the puncture site. Spot overhead fluoroscopic images in the and lateral projection with additional lateral flexion and extension views.  Total fluoro time was 2.1 minutes and 1 fluoroscopic image was obtained.  COMPARISON:  06/04/2019  FINDINGS:  No complications.  Limited images due to limited mobility.    Impression Successful fluoroscopic myelogram. See dedicated CT myelogram thoracic and lumbar spine.     Results for orders placed during the hospital encounter of 07/12/19   CT Myelography Thoracic Lumbar Spine    Narrative COMPARISON:  None  FINDINGS:  Thoracic spine:  There is no acute fracture of the thoracic spine.  There is an old anterior wedge compression fracture of the T12 vertebral body.  There is a Schmorl's node seen within the superior aspect of the T8 vertebral body.  There is a slight levoscoliosis of the thoracic spine centered at T3.  T1-T2: Unremarkable.  T2-T3: Unremarkable.  T3-T4: There is a disc bulge with left ventral surface thecal sac effacement but no impingement upon the cord.  T4-T5: Unremarkable.  T5-T6: Unremarkable.  T6-T7: There is a central focal disc protrusion which causes right ventral  surface cord impingement.  The disc protrusion measures 7 mm in AP dimension, 14 mm in craniocaudal dimension, and 10 mm in transverse dimension.  T7-T8: Unremarkable.  T8-T9: There is a central disc extrusion with the disc extrusion extending inferiorly from the level of the disc and measuring 19 mm in craniocaudal dimension, 8 mm in transverse dimension, and 5 mm in anterior-posterior dimension.  This disc extrusion slightly impinges upon the ventral surface of the cord.  T9-T10: Unremarkable.  T10-T11: There is a disc extrusion in the central to right central location which is extending superiorly from the disc level and measuring 18 mm in craniocaudal dimension, 5 mm in anterior-posterior dimension, and 8 mm in transverse dimension.  This disc extrusion impinges upon the right ventral surface of the cord.  T11-T12: Minimal disc bulge with ventral surface thecal sac effacement but no cord impingement.  T12-L1: Right central disc protrusion measuring 10 mm in craniocaudal dimension, 8 mm in transverse dimension, and 5 mm in anterior-posterior dimension which causes slight right ventral surface cord remodeling.    Lumbar spine:  No fracture of the lumbar spine.  No paraspinal mass.  The conus medullaris has a normal morphology and terminates at the L1-L2 level.  L1-L2: Mild disc bulge with mild ventral surface thecal sac effacement.  Patent neural foramina.  L2-L3: Degenerative disc disease.  Disc bulge with severe central canal stenosis with the AP diameter at the point of maximal stenosis measuring 7 mm.  Mild bilateral neural foraminal narrowing.  Bilateral facet joint osteoarthritis.  L3-L4: Severe degenerative disc disease.  There is critical central canal stenosis at this level with no contrast-enhanced CSF identified at the disc level.  There is severe crowding of the nerve roots in the compressed thecal sac at this level.  The disc protrusion extends into the right neural foramen causing severe right neural  foraminal stenosis and probable impingement upon the exiting right L3 nerve root.  L4-5: Severe degenerative disc disease.  Disc bulge along with ligamentum flavum thickening which causes severe central canal stenosis with the AP diameter of the thecal sac measuring 8 mm the point of maximal stenosis.  The disc protrusion extends into the bilateral neural foramen causing severe right neural foraminal stenosis and moderate left neural foraminal stenosis.  L5-S1: Degenerative disc disease.  Disc bulge with mild central canal narrowing.  Mild to moderate left neural foraminal stenosis.    Impression 1. There is critical central canal stenosis at L3-L4 due to a disc bulge and ligamentum flavum thickening.  2. Severe right L3-L4 neural foraminal stenosis with probable impingement upon the exiting right L3 nerve root.  3. Severe central canal stenosis at L2-L3 and L4-L5 due to disc bulges.  4. Severe right L4-5 neural foraminal stenosis.  5. Multiple disc protrusions in the thoracic spine as detailed each level with some ventral surface cord impingement seen at the T6-T7, T8-T9, and T10-T11 levels.  All CT scans at this facility are performed  using dose modulation techniques as appropriate to performed exam including the following:  automated exposure control; adjustment of mA and/or kV according to the patients size (this includes techniques or standardized protocols for targeted exams where dose is matched to indication/reason for exam: i.e. extremities or head);  iterative reconstruction technique.         Review of Systems:  Review of Systems   Constitutional: Negative for fever.   HENT:        Left-sided rash of the face   Eyes: Negative for blurred vision.   Respiratory: Negative for cough and wheezing.    Cardiovascular: Negative for chest pain and orthopnea.   Gastrointestinal: Negative for constipation, diarrhea, nausea and vomiting.   Genitourinary: Negative for dysuria.   Musculoskeletal: Positive for back  "pain.   Skin: Negative for itching and rash.   Neurological: Positive for weakness.   Endo/Heme/Allergies: Does not bruise/bleed easily.       Physical Exam:  Vitals:  /78 (BP Location: Right arm, Patient Position: Sitting, BP Method: Medium (Automatic))   Pulse 69   Resp 18   Ht 5' 4" (1.626 m)   Wt 59.4 kg (131 lb)   LMP  (LMP Unknown)   BMI 22.49 kg/m²   (reviewed on 6/3/2020)    General: alert and oriented, in no apparent distress.  Gait: in wheelchair.  Skin: no rashes, no discoloration, no obvious lesions  HEENT: normocephalic, atraumatic. Pupils equal and round.  Cardiovascular: no significant peripheral edema present.  Respiratory: without use of accessory muscles of respiration.    Musculoskeletal - Lumbar Spine:  -severe tenderness to palpation over lower lumbar vertebral body midline and facets bilaterally and; severe tenderness to palpation over right lower thoracic spine and flank    Shoulders; increased pain with all range of motion of bilateral shoulders;    Neuro - Lower Extremities:  - Extremity Reflexes: Brisk and symmetric throughout  - Sensory: Sensation to light touch intact bilaterally      Psych:  Mood and affect is appropriate    Assessment  1. 74 y.o. year old patient with PMH of   Past Medical History:   Diagnosis Date    Abnormal ECG 8/5/2019    Aneurysm     Anticoagulant long-term use     Arthritis     CAD in native artery 8/5/2019    COPD (chronic obstructive pulmonary disease)     Diabetes mellitus     Glaucoma     Hypertension     Seizures     Shingles 05/27/2017    Stroke       presenting with pain located left side of her face, left lower extremity, lumbar spine. Diagnoses include:    ICD-10-CM ICD-9-CM   1. Lumbar radiculopathy M54.16 724.4   2. Shoulder arthritis M19.019 716.91      2. Pain Generators / Etiology: Lumbar Radiculopathy and Lumbar Spondylosis, post herpetic neuralgia  3. Failed Meds (E- Effective, NE- Not Effective):  Celebrex-minimally " effective, ibuprofen-minimally effective, gabapentin-minimally effective, Lyrica-minimally effective, Valtrex-minimally effective, steroid-effective  4. Physical Therapy - Completed in the Past  5. Psychological comorbidities - None  6. Anticoagulants / Antiplatelets: Plavix      Plan:  1. Interventional:   - schedule for right T12/L1 transforaminal epidural steroid injection; will then plan for bilateral intra-articular shoulder joint injections; will then plan for bilateral lumbar radiofrequency ablation with the right side taking place 1st and then the left side     2. Pharmacologic: Continue Cymbalta 30 mg once daily PRN.  Continue tramadol as prescribed; will provide Medrol Dosepak prescription to help with current symptoms    3. Rehabilitative:  Can continue physical therapy exercises at home as tolerated    4. Diagnostic:  None; reviewed CT scan thoracic and lumbar spine patient today in clinic    5. Follow up:  Follow up in clinic 4 weeks after the injection    - This condition does not require this patient to take time off of work.   - I discussed the risks, benefits, and alternatives to potential treatment options. All questions and concerns were fully addressed today in clinic. Dr. Dias was consulted regarding the patient plan and agrees.    SCOUT Dias MD  Interventional Pain Medicine  [facility] [place]

## 2020-06-03 NOTE — PROGRESS NOTES
Subjective:       Patient ID: Shireen Felix is a 74 y.o. female.    Chief Complaint: Mobility Eval    With her brother for power chair evaluation. She is overdue her regular medical follow up. She has regular wheel chair at home but having more trouble getting aroun. Unable to move 5 feet without stumbling, loosing balance, using arms, or resting. Unable to get up steps except with assistance. Has not yet had mobility assessment. Is followed by PMR.    1)DM: not checking BS, no hyper/hypoglycemic symptoms.  2)HTN: B/Pare not checked at home, no HTNive symptoms.  COPD: flaring despite breo. Still smokes.  3) CAD: negative stress testing, seeing cards, lipid tx id lopid  4)Hx CVA: contributes to mobility impairment  5) spinal stenosis: limiting mobility, see PMR, always with pain.    Review of Systems   Constitutional: Negative for fever and unexpected weight change.   HENT: Negative for congestion, nosebleeds and rhinorrhea.    Eyes: Negative for discharge and redness.   Respiratory: Positive for cough and shortness of breath. Negative for wheezing.    Cardiovascular: Negative for chest pain, palpitations and leg swelling.   Gastrointestinal: Negative for anal bleeding, blood in stool, constipation, diarrhea and vomiting.   Endocrine: Negative for polyphagia and polyuria.   Genitourinary: Negative for decreased urine volume, difficulty urinating, hematuria and menstrual problem.   Musculoskeletal: Positive for arthralgias, back pain, gait problem, neck pain and neck stiffness. Negative for joint swelling.   Skin: Negative for rash and wound.   Neurological: Negative for syncope and headaches.   Hematological: Bruises/bleeds easily (skin bruising, no other bleeding signs).   Psychiatric/Behavioral: Negative for behavioral problems and sleep disturbance.       Objective:      Physical Exam   Constitutional: She is oriented to person, place, and time. She appears well-developed and well-nourished. No distress.    Neck: No JVD present. No thyromegaly present.   Cardiovascular: Normal rate, regular rhythm and normal heart sounds.   No murmur heard.  Pulmonary/Chest: No respiratory distress. She has no wheezes. She has no rales.   Decreased BS in all fields, mildly prolonged exp phase   Abdominal: Soft. She exhibits no distension and no mass. There is no tenderness. There is no guarding.   Musculoskeletal: She exhibits deformity (scoliosis, stooped, DJD changes in hands.). She exhibits no edema.   3/5 lower ext, knee,hip muscles strength, 4/5 upper ext, gait short staggering gait, poor balance, has to rock out of chair, uses hands to move. Rests getting to table, has to use hands and assistance up table step. Muscle atrophy of upper and lower ext equal.   Lymphadenopathy:     She has no cervical adenopathy.   Neurological: She is alert and oriented to person, place, and time. No cranial nerve deficit. She exhibits abnormal muscle tone. Coordination abnormal.   Skin: Capillary refill takes less than 2 seconds. No rash noted.   Chronic thin skin with chronic bruising.   Psychiatric: She has a normal mood and affect. Her behavior is normal. Judgment and thought content normal.       Assessment:       1. Spinal stenosis of lumbar region with neurogenic claudication    2. Mucopurulent chronic bronchitis    3. Hx of completed stroke    4. Focal seizures    5. Coronary artery disease of native artery of native heart with stable angina pectoris    6. Controlled type 2 diabetes mellitus with stage 3 chronic kidney disease, without long-term current use of insulin    7. Gait disturbance    8. Recurrent falls    9. Encounter for follow-up surveillance of skin cancer    10. Gastroesophageal reflux disease, esophagitis presence not specified    11. Essential hypertension        Plan:       Spinal stenosis of lumbar region with neurogenic claudication    Mucopurulent chronic bronchitis  -     Ambulatory referral/consult to Pulmonology;  Future; Expected date: 06/10/2020    Hx of completed stroke  -     WHEELCHAIR FOR HOME USE    Focal seizures  -     WHEELCHAIR FOR HOME USE    Coronary artery disease of native artery of native heart with stable angina pectoris    Controlled type 2 diabetes mellitus with stage 3 chronic kidney disease, without long-term current use of insulin  -     Ambulatory referral/consult to Optometry; Future; Expected date: 06/10/2020  -     Hemoglobin A1C; Future; Expected date: 06/03/2020  -     Microalbumin/creatinine urine ratio; Future; Expected date: 06/03/2020  -     Lipid Panel; Future; Expected date: 06/03/2020  -     ALT (SGPT); Future; Expected date: 06/03/2020  -     AST (SGOT); Future; Expected date: 06/03/2020  -     Basic metabolic panel; Future; Expected date: 06/03/2020    Gait disturbance  -     WHEELCHAIR FOR HOME USE    Recurrent falls  -     WHEELCHAIR FOR HOME USE    Encounter for follow-up surveillance of skin cancer  -     Ambulatory referral/consult to Dermatology; Future; Expected date: 06/10/2020    Gastroesophageal reflux disease, esophagitis presence not specified    Essential hypertension    Other orders  -     atorvastatin (LIPITOR) 40 MG tablet; Take 1 tablet (40 mg total) by mouth once daily.  Dispense: 90 tablet; Refill: 3    Start stain, will weane lopid in future. Refer for powered assistance evaluation. Maintain other meds and subspecialty care until seen back with labs ib 2-4 weeks and then q 6 months.    Patient has regular wheel chair at home but having more trouble getting around, limiting ability to propel a manual chair, and has fallen out of wheelchair multiple times. Patient is unable to move 5 feet without stumbling, loosing balance, using arms, or resting. The patient is unable to transfer to and from the scooter and maintain postural stability while operating the [employer] system. 3/5 LE, knees, and hip muscles strength, 4/5 UE strength, gait short staggering gait, poor balance, has  to rock out of chair, uses hands to move. Muscle atrophy of upper and lower extremities equal. Patient cannot walk more than 3 feet, even then with guided assistance, has to rest getting to table, has to use hands and assistance up table step, so patient is unable to complete bathing, toileting grooming, and eating without a Power Chair. Patient unable to walk any time frame without being propelled in wheelchair and with assistance. Patient is willing and able to perform bathing, toileting, grooming, and eating with a Power Chair. The power chair will allow her to get into positions and maintain her ability to perform ADLs.

## 2020-06-03 NOTE — PATIENT INSTRUCTIONS
-will provide prescription for Medrol Dosepak  -will schedule for right T12/L1 transforaminal epidural steroid injection  -will then schedule bilateral intra-articular shoulder joint injections  -will then schedule for right L3-5 lumbar radiofrequency ablation to be followed by left L3-5 lumbar radiofrequency ablation  -follow up in clinic 4 weeks after the injection

## 2020-06-05 ENCOUNTER — TELEPHONE (OUTPATIENT)
Dept: PAIN MEDICINE | Facility: CLINIC | Age: 75
End: 2020-06-05

## 2020-06-05 ENCOUNTER — TELEPHONE (OUTPATIENT)
Dept: INTERNAL MEDICINE | Facility: CLINIC | Age: 75
End: 2020-06-05

## 2020-06-05 NOTE — TELEPHONE ENCOUNTER
----- Message from Adriana Maguire sent at 6/5/2020  9:32 AM CDT -----  Contact: Rm/brother  Rm got a miss call with no message and he think it may have came from this office, Please call her at [phone].    Thanks  Td

## 2020-06-05 NOTE — TELEPHONE ENCOUNTER
LATE ENTRY 06/03/20: Faxed powerchair order to [employer], instructing their office to send 7 element order, ins requirements, etc. Needed for pt's powerchair order to our office, fax transmission confirmed F#[phone].

## 2020-06-08 ENCOUNTER — TELEPHONE (OUTPATIENT)
Dept: PULMONOLOGY | Facility: CLINIC | Age: 75
End: 2020-06-08

## 2020-06-08 NOTE — TELEPHONE ENCOUNTER
----- Message from Bennett Talamantes sent at 6/8/2020 12:35 PM CDT -----  Contact: Ypxygcj-[phone]  Pt's brother would like regarding pt's medication. Please call back at [phone].  Md Edin

## 2020-06-11 ENCOUNTER — TELEPHONE (OUTPATIENT)
Dept: PAIN MEDICINE | Facility: CLINIC | Age: 75
End: 2020-06-11

## 2020-06-11 DIAGNOSIS — Z03.818 ENCOUNTER FOR OBSERVATION FOR SUSPECTED EXPOSURE TO OTHER BIOLOGICAL AGENTS RULED OUT: Primary | ICD-10-CM

## 2020-06-11 NOTE — TELEPHONE ENCOUNTER
----- Message from Jarrell Dye MD sent at 6/10/2020  4:59 PM CDT -----  OK sure no cardiac issues with stopping Plavix for 5 days and resume post. Thanks    - Dr. Dye  ----- Message -----  From: Lenore Juárez MA  Sent: 6/10/2020   4:34 PM CDT  To: MD Dr. Larissa Jaimes request to perform Right Lumbar TF XANDER  for patient. Pt is currently onPlavix 75mg and will need to d/c 1 (one) week prior to having this injection along with all other blood thinners including aspirin.     Please advise or contact me at ext [phone] with questions.     Thanks  Lenore

## 2020-06-11 NOTE — TELEPHONE ENCOUNTER
Contacted pt. Injection scheduled. Pre injection instructions taught and mailed. Covid Orders entered. Call patient brother Rm to get patient scheduled for procedure.  1st procedure Right Lumbar TF XANDER  Scheduled for 06/19/2020 pending insurance approval patient was made aware that 06/11/2020 would be the last dose of 7 DAYS prior to procedure:  Stop taking Plavix/Clopridogrel/aspirin/Advil/Ibuprofen/Excedrin  STOP ALL SUPPLEMENT AND VITAMINS  Second procedure bilateral shoulder joint injection scheduled for 07/10/2020 does not have to stop medication for this procedure.

## 2020-06-12 DIAGNOSIS — E55.9 VITAMIN D INSUFFICIENCY: ICD-10-CM

## 2020-06-12 RX ORDER — ASPIRIN 325 MG
50000 TABLET, DELAYED RELEASE (ENTERIC COATED) ORAL
Qty: 12 CAPSULE | Refills: 0 | Status: SHIPPED | OUTPATIENT
Start: 2020-06-12 | End: 2023-04-28 | Stop reason: SDUPTHER

## 2020-06-12 NOTE — PRE-PROCEDURE INSTRUCTIONS
Attempted to PAT patient. Brother, Rm is the contact on file. Unable to LVM due to mailbox is full.

## 2020-06-15 NOTE — PRE-PROCEDURE INSTRUCTIONS
Spoke with Ariana Goff, pt's daughter, to PAT pt. Daughter states that pt has been off Plavix since yesterday 6/15/20.     Spoke with pt's daughter- Ariana Goff regarding procedure scheduled on 6/19/2020  Arrival time 8:50  Has patient been sick with fever or on antibiotics within the last 7 days? No  Has patient received a vaccination within the last 7 days? No  Has the patient stopped all medications as directed? Yes- last dose of Plavix prior to 6/14/20. Vit D and Mobic also held. Hold all NSAIDs/supplements also  Does patient have a pacemaker and or defibrillator? No  Does the patient have a ride to and from procedure and someone reliable to remain with patient? Yes- son Rm   Is the patient diabetic? Yes- Hold glucophage morning of procedure  Does the patient have sleep apnea? No Or use O2 at home? No  Is the patient receiving sedation? yes  Is the patient instructed to remain NPO beginning at midnight the night before their procedure? yes  Procedure location confirmed with patient? Yes- [facility]  Covid testing: 15:50 on 6/17/20 at the [facility] location

## 2020-06-16 ENCOUNTER — PATIENT OUTREACH (OUTPATIENT)
Dept: ADMINISTRATIVE | Facility: OTHER | Age: 75
End: 2020-06-16

## 2020-06-17 ENCOUNTER — TELEPHONE (OUTPATIENT)
Dept: PAIN MEDICINE | Facility: CLINIC | Age: 75
End: 2020-06-17

## 2020-06-17 NOTE — PRE-PROCEDURE INSTRUCTIONS
Message received from Lenore at Dr. Dias office. Patient will be out of town and unable to get COVID test. Spoke with pt's son Rm regarding procedure arrival time changed to 8:20 am for rapid test morning of procedure. Verbalized understanding.

## 2020-06-17 NOTE — TELEPHONE ENCOUNTER
----- Message from Tammy Engle sent at 6/17/2020 10:22 AM CDT -----  Contact: Brother(Rory)-[phone]  Would like to consult with nurse regarding patient covid testing. Please call back at [phone]. Thanks/ar

## 2020-06-19 ENCOUNTER — HOSPITAL ENCOUNTER (OUTPATIENT)
Facility: HOSPITAL | Age: 75
Discharge: HOME | End: 2020-06-19
Attending: PAIN MEDICINE | Admitting: PAIN MEDICINE
Payer: MEDICARE

## 2020-06-19 ENCOUNTER — TELEPHONE (OUTPATIENT)
Dept: INTERNAL MEDICINE | Facility: CLINIC | Age: 75
End: 2020-06-19

## 2020-06-19 VITALS
WEIGHT: 129.31 LBS | SYSTOLIC BLOOD PRESSURE: 179 MMHG | TEMPERATURE: 98 F | HEIGHT: 63 IN | OXYGEN SATURATION: 100 % | HEART RATE: 87 BPM | DIASTOLIC BLOOD PRESSURE: 79 MMHG | RESPIRATION RATE: 16 BRPM | BODY MASS INDEX: 22.91 KG/M2

## 2020-06-19 DIAGNOSIS — M54.16 LUMBAR RADICULOPATHY: Primary | ICD-10-CM

## 2020-06-19 LAB
POCT GLUCOSE: 88 MG/DL (ref 70–110)
SARS-COV-2 RDRP RESP QL NAA+PROBE: NEGATIVE

## 2020-06-19 PROCEDURE — 64483 PR EPIDURAL INJ, ANES/STEROID, TRANSFORAMINAL, LUMB/SACR, SNGL LEVL: ICD-10-PCS | Mod: HCNC,RT,, | Performed by: PAIN MEDICINE

## 2020-06-19 PROCEDURE — U0002 COVID-19 LAB TEST NON-CDC: HCPCS | Mod: HCNC

## 2020-06-19 PROCEDURE — 63600175 PHARM REV CODE 636 W HCPCS: Mod: HCNC | Performed by: PAIN MEDICINE

## 2020-06-19 PROCEDURE — 25000003 PHARM REV CODE 250: Mod: HCNC | Performed by: PAIN MEDICINE

## 2020-06-19 PROCEDURE — 99152 MOD SED SAME PHYS/QHP 5/>YRS: CPT | Mod: HCNC | Performed by: PAIN MEDICINE

## 2020-06-19 PROCEDURE — 25500020 PHARM REV CODE 255: Mod: HCNC | Performed by: PAIN MEDICINE

## 2020-06-19 PROCEDURE — 64483 NJX AA&/STRD TFRM EPI L/S 1: CPT | Mod: HCNC,RT,, | Performed by: PAIN MEDICINE

## 2020-06-19 PROCEDURE — 64483 NJX AA&/STRD TFRM EPI L/S 1: CPT | Mod: HCNC | Performed by: PAIN MEDICINE

## 2020-06-19 PROCEDURE — 64479 NJX AA&/STRD TFRM EPI C/T 1: CPT | Mod: HCNC | Performed by: PAIN MEDICINE

## 2020-06-19 RX ORDER — FENTANYL CITRATE 50 UG/ML
INJECTION, SOLUTION INTRAMUSCULAR; INTRAVENOUS
Status: DISCONTINUED | OUTPATIENT
Start: 2020-06-19 | End: 2020-06-19 | Stop reason: HOSPADM

## 2020-06-19 RX ORDER — DEXAMETHASONE SODIUM PHOSPHATE 10 MG/ML
INJECTION INTRAMUSCULAR; INTRAVENOUS
Status: DISCONTINUED | OUTPATIENT
Start: 2020-06-19 | End: 2020-06-19 | Stop reason: HOSPADM

## 2020-06-19 RX ORDER — LIDOCAINE HYDROCHLORIDE 10 MG/ML
INJECTION, SOLUTION EPIDURAL; INFILTRATION; INTRACAUDAL; PERINEURAL
Status: DISCONTINUED | OUTPATIENT
Start: 2020-06-19 | End: 2020-06-19 | Stop reason: HOSPADM

## 2020-06-19 RX ORDER — MIDAZOLAM HYDROCHLORIDE 1 MG/ML
INJECTION, SOLUTION INTRAMUSCULAR; INTRAVENOUS
Status: DISCONTINUED | OUTPATIENT
Start: 2020-06-19 | End: 2020-06-19 | Stop reason: HOSPADM

## 2020-06-19 NOTE — PLAN OF CARE
Patient d/c home in stable condition via wheelchair with ride. Verbalized understanding of d/c instructions. Patient voiced no complaints. Patient stood at side of bed, walked steps with no new motor deficits. Neuro intact.   One band aid to right mid back. Clean and dry.

## 2020-06-19 NOTE — DISCHARGE INSTRUCTIONS

## 2020-06-19 NOTE — OP NOTE
"Procedure: Lumbar Transforaminal Epidural Steroid Injection under Fluoroscopic Guidance (supraneural approach)    Level: T12/L1     Side: Right    PROCEDURE DATE: 6/19/2020    Pre-operative Diagnosis: Lumbar Radiculopathy  Post-operative Diagnosis: Lumbar Radiculopathy    Provider: SCOUT Dias MD  Assistant(s): None    Anesthesia: Local, IV Sedation    >> 1 mg of VERSED    >> 12.5 mcg of FENTANYL     Indication: Low back pain with radiculopathy consistent with distribution of targeted nerve. Symptoms unresponsive to conservative treatments. Fluoroscopy was used to optimize visualization of needle placement and to maximize safety.     Procedure Description / Technique:  The patient was seen and identified in the preoperative area. Risks, benefits, complications, and alternatives were discussed with the patient. The patient agreed to proceed with the procedure and signed the consent. The site and side of the procedure was identified and marked. An IV was started. The patient was taken to the procedural suite.    The patient was positioned in prone orientation on procedure table and a pillow was placed under the abdomen to reduce lumbar lordosis. A time out was performed prior to any intervention. The procedure, site, side, and allergies were stated and agreed to by all present. The lumbosacral area was widely prepped with ChloraPrep. The procedural site was draped in usual sterile fashion. Vital signs were closely monitored throughout this procedure. Conscious sedation was used for this procedure to decrease patient anxiety.    The target area was visualized under fluoroscopy. The cephalocaudal angle of the fluoroscope was adjusted as to align the vertebral end plates. The fluoroscopic arm was rotated ipsilaterally to an angle of approximately 30 degrees until the "alberto dog" outline came into view and the tip of the inferior superior articular process pointed towards the midline, 6:00 position of the above " "pedicle. A 25 gauge 3.5 inch spinal needle was directed towards the "chin" of the "alberto dog" (adjacent to the pars interarticularis and inferior to the pedicle). The needle was advanced until OS was met at the inferior border of the pedicle / pars interface. The needle was adjusted so that it would pass inferior to the osseous border. The fluoroscope was then placed in the lateral position and the needle was slowly advanced until it rested in the posterior 1/3rd of the vertebral foramen. AP fluoroscopy was checked and the needle tip rested at the 6:00 position under the pedicle. No paresthesia was elicited during needle placement. With the needle tip in its final position, gentle aspiration was negative for blood and CSF. Omnipaque 240 (1 to 2 mL) was injected under live fluoroscopy. Microbore tubing was used for injection. There was no pain or paresthesia on injection. The contrast clearly delineated the targeted nerve root on AP fluoroscopy. No vascular uptake was seen. A solution containing 1 mL of 1% PF Lidocaine and 1 mL of Dexamethasone (10 mg/mL) was mixed and 2 mL was injected slowly at each level targeted. There was minimal resistance on injection. No pain or paresthesia was elicited on injection. The stylet was replaced and the needle was withdrawn intact. This procedure was performed for each of the above indicated levels.     Description of Findings: Not applicable    Prosthetic devices, grafts, tissues, or devices implanted: None    Specimen Removed: No    Estimated Blood Loss: minimal    COMPLICATIONS: None    DISPOSITION / PLANS: The patient was transferred to the recovery area in a stable condition for observation. The patient was reexamined prior to discharge. There was no evidence of acute neurologic injury following the procedure.  Patient was discharged from the recovery room after meeting discharge criteria. Home discharge instructions were given to the patient by the staff.    "

## 2020-06-19 NOTE — TELEPHONE ENCOUNTER
Sent request to Dr. Chaudhari for addendum needed on OV 06/03/20 per [facility] [employer], verabge needed for powerchair per Medicare requirements.

## 2020-06-25 ENCOUNTER — TELEPHONE (OUTPATIENT)
Dept: PAIN MEDICINE | Facility: CLINIC | Age: 75
End: 2020-06-25

## 2020-06-25 NOTE — TELEPHONE ENCOUNTER
----- Message from Vonda Harris sent at 6/25/2020 10:54 AM CDT -----  She has a procedure on 7/10 and she needs to reschedule for a different day. Please call pt Rm Ojeda [phone]. Thank you

## 2020-06-25 NOTE — TELEPHONE ENCOUNTER
Patient had to be rescheduled for procedure due to transportation  issues pre opp has been notified//dp

## 2020-06-30 ENCOUNTER — TELEPHONE (OUTPATIENT)
Dept: INTERNAL MEDICINE | Facility: CLINIC | Age: 75
End: 2020-06-30

## 2020-06-30 NOTE — TELEPHONE ENCOUNTER
"Fax request rec'vd from [facility] [employer] requesting 1) "Standard Written order: pt's name, date, Powerchair, Batteries X2 / adjustable armrest X2"   2) copy of MD signed chart notes.    Request sent to Dr. Chaudhari for review for when he returns to clinic 07/06/20.  "

## 2020-06-30 NOTE — DISCHARGE SUMMARY
[facility]  Short Stay  Discharge Summary    Admit Date: 6/19/2020    Discharge Date and Time: 6/19/2020 10:46 AM      Discharge Attending Physician: SCOUT Dias MD     Hospital Course (synopsis of major diagnoses, care, treatment, and services provided during the course of the hospital stay): Patient was admitted to Pre-op where informed consent was signed.  The patient was then taken to the procedure suite where the procedure was performed.  The patient was then return to the Pre-Op area and discharge was performed.     Final Diagnoses:    Principal Problem: <principal problem not specified>   Secondary Diagnoses:   Active Hospital Problems    Diagnosis  POA    Lumbar radiculopathy [M54.16]  Yes      Resolved Hospital Problems   No resolved problems to display.       Discharged Condition: good    Disposition: Home or Self Care    Follow up/Patient Instructions:    Medications:  Reconciled Home Medications:      Medication List      CONTINUE taking these medications    albuterol 90 mcg/actuation inhaler  Commonly known as: PROVENTIL/VENTOLIN HFA  Inhale 2 puffs into the lungs every 4 (four) hours as needed.     albuterol-ipratropium 2.5 mg-0.5 mg/3 mL nebulizer solution  Commonly known as: DUO-NEB  Take 3 mLs by nebulization every 6 (six) hours as needed for Wheezing. Rescue     amLODIPine 10 MG tablet  Commonly known as: NORVASC  Take 1 tablet (10 mg total) by mouth once daily.     APTIOM 600 mg Tab  Generic drug: eslicarbazepine  TAKE 1 TABLET BY MOUTH EVERY EVENING     atorvastatin 40 MG tablet  Commonly known as: LIPITOR  Take 1 tablet (40 mg total) by mouth once daily.     cholecalciferol (vitamin D3) 1,250 mcg (50,000 unit) capsule  Take 1 capsule (50,000 Units total) by mouth every 7 days.     clopidogreL 75 mg tablet  Commonly known as: PLAVIX  Take 1 tablet (75 mg total) by mouth once daily.     cyclobenzaprine 5 MG tablet  Commonly known as: FLEXERIL  TAKE 1 TABLET BY MOUTH THREE  TIMES DAILY AS NEEDED FOR MUSCLE SPASMS     DALIRESP 500 mcg Tab  Generic drug: roflumilast  Take 1 tablet by mouth once daily.     dexlansoprazole 60 mg capsule  Commonly known as: DEXILANT  Take 1 capsule (60 mg total) by mouth once daily.     diltiaZEM 120 mg 24 hr tablet  Commonly known as: CARDIZEM LA  Take 1 tablet (120 mg total) by mouth once daily.     DULoxetine 30 MG capsule  Commonly known as: CYMBALTA  Take 1 capsule (30 mg total) by mouth once daily.     escitalopram oxalate 20 MG tablet  Commonly known as: LEXAPRO  Take 1 tablet (20 mg total) by mouth once daily.     fluticasone furoate-vilanteroL 200-25 mcg/dose Dsdv diskus inhaler  Commonly known as: BREO ELLIPTA  Inhale 1 puff into the lungs every evening.     fluticasone propionate 50 mcg/actuation nasal spray  Commonly known as: FLONASE  1 spray (50 mcg total) by Each Nostril route once daily.     gemfibroziL 600 MG tablet  Commonly known as: LOPID  Take 1 tablet (600 mg total) by mouth once daily.     guaifenesin 100 mg/5 ml 100 mg/5 mL syrup  Commonly known as: ROBITUSSIN  Take 200 mg by mouth as needed for Cough.     loratadine 10 mg tablet  Commonly known as: CLARITIN  Take 1 tablet by mouth once daily.     MELATONIN ORAL  Take by mouth nightly as needed.     meloxicam 7.5 MG tablet  Commonly known as: MOBIC  Take 1 tablet (7.5 mg total) by mouth once daily.     metFORMIN 500 MG tablet  Commonly known as: GLUCOPHAGE  Take 1 tablet (500 mg total) by mouth 2 (two) times daily.     metoprolol tartrate 25 MG tablet  Commonly known as: LOPRESSOR  Take 1 tablet (25 mg total) by mouth 2 (two) times daily. TAKE 1 TABLET BY MOUTH TWICE DAILY WITH FOOD FOR HEART AND BLOOD PRESSURE     montelukast 10 mg tablet  Commonly known as: SINGULAIR  Take 1 tablet (10 mg total) by mouth once daily.     mupirocin 2 % ointment  Commonly known as: BACTROBAN  Apply topically 3 (three) times daily.     neomycin-polymyxin-hydrocortisone 3.5-10,000-1 mg/mL-unit/mL-% otic  suspension  Commonly known as: CORTISPORIN  Place 4 drops into the right ear 4 (four) times daily.     PROBIOTIC ACIDOPHILUS ORAL  Take 1 capsule by mouth 2 (two) times daily.     PROLIA 60 mg/mL Syrg  Generic drug: denosumab  every 6 (six) months.     ranolazine 1,000 mg Tb12  Commonly known as: RANEXA  Take 1 tablet (1,000 mg total) by mouth 2 (two) times daily.     SENNA 8.6 mg Cap  Generic drug: sennosides  Take by mouth as needed.     SPIRIVA WITH HANDIHALER 18 mcg inhalation capsule  Generic drug: tiotropium  Inhale 1 capsule into the lungs once daily.     tolterodine 4 MG 24 hr capsule  Commonly known as: DETROL LA  Take 1 capsule (4 mg total) by mouth once daily.     traMADoL 50 mg tablet  Commonly known as: ULTRAM  Take 1 tablet (50 mg total) by mouth every 6 (six) hours as needed.     valsartan 320 MG tablet  Commonly known as: DIOVAN  Take 1 tablet (320 mg total) by mouth once daily.     venlafaxine 150 mg Tr24  Take 150 mg by mouth once daily.        ASK your doctor about these medications    methylPREDNISolone 4 mg tablet  Commonly known as: MEDROL DOSEPACK  use as directed          Discharge Procedure Orders   Diet general     Call MD for:  severe uncontrolled pain     Call MD for:  difficulty breathing, headache or visual disturbances     Call MD for:  redness, tenderness, or signs of infection (pain, swelling, redness, odor or green/yellow discharge around incision site)     Activity as tolerated

## 2020-07-02 ENCOUNTER — NURSE TRIAGE (OUTPATIENT)
Dept: ADMINISTRATIVE | Facility: CLINIC | Age: 75
End: 2020-07-02

## 2020-07-02 NOTE — TELEPHONE ENCOUNTER
Post Procedural Symptom Tracker. Pt had in pre-admit for another surgery today office visit on which was days post-op from their procedure on . Per symptom tracker protocol, no contact made. No follow up needed.      Reason for Disposition   Caller has already spoken with the PCP and has no further questions.    Protocols used: NO CONTACT OR DUPLICATE CONTACT CALL-A-AH

## 2020-07-06 ENCOUNTER — LAB VISIT (OUTPATIENT)
Dept: OTOLARYNGOLOGY | Facility: CLINIC | Age: 75
End: 2020-07-06
Payer: MEDICARE

## 2020-07-06 DIAGNOSIS — Z03.818 ENCOUNTER FOR OBSERVATION FOR SUSPECTED EXPOSURE TO OTHER BIOLOGICAL AGENTS RULED OUT: ICD-10-CM

## 2020-07-06 DIAGNOSIS — N18.30 CONTROLLED TYPE 2 DIABETES MELLITUS WITH STAGE 3 CHRONIC KIDNEY DISEASE, WITHOUT LONG-TERM CURRENT USE OF INSULIN: ICD-10-CM

## 2020-07-06 DIAGNOSIS — E11.22 CONTROLLED TYPE 2 DIABETES MELLITUS WITH STAGE 3 CHRONIC KIDNEY DISEASE, WITHOUT LONG-TERM CURRENT USE OF INSULIN: ICD-10-CM

## 2020-07-06 PROCEDURE — U0003 INFECTIOUS AGENT DETECTION BY NUCLEIC ACID (DNA OR RNA); SEVERE ACUTE RESPIRATORY SYNDROME CORONAVIRUS 2 (SARS-COV-2) (CORONAVIRUS DISEASE [COVID-19]), AMPLIFIED PROBE TECHNIQUE, MAKING USE OF HIGH THROUGHPUT TECHNOLOGIES AS DESCRIBED BY CMS-2020-01-R: HCPCS | Mod: HCNC

## 2020-07-07 NOTE — TELEPHONE ENCOUNTER
LATE ENTRY 07/06/20: Returned faxed MD written order w/ signed OV 06/03/20 to [employer], fax transmission confirmed F#[phone].

## 2020-07-08 LAB — SARS-COV-2 RNA RESP QL NAA+PROBE: NOT DETECTED

## 2020-07-09 ENCOUNTER — HOSPITAL ENCOUNTER (OUTPATIENT)
Facility: HOSPITAL | Age: 75
Discharge: HOME | End: 2020-07-09
Attending: PAIN MEDICINE | Admitting: PAIN MEDICINE
Payer: MEDICARE

## 2020-07-09 VITALS
HEART RATE: 91 BPM | BODY MASS INDEX: 22.55 KG/M2 | SYSTOLIC BLOOD PRESSURE: 169 MMHG | RESPIRATION RATE: 16 BRPM | TEMPERATURE: 98 F | HEIGHT: 64 IN | DIASTOLIC BLOOD PRESSURE: 76 MMHG | OXYGEN SATURATION: 100 % | WEIGHT: 132.06 LBS

## 2020-07-09 DIAGNOSIS — M19.019 SHOULDER ARTHRITIS: Primary | ICD-10-CM

## 2020-07-09 LAB — POCT GLUCOSE: 97 MG/DL (ref 70–110)

## 2020-07-09 PROCEDURE — 25500020 PHARM REV CODE 255: Mod: HCNC | Performed by: PAIN MEDICINE

## 2020-07-09 PROCEDURE — 77002 NEEDLE LOCALIZATION BY XRAY: CPT | Mod: 26,HCNC,, | Performed by: PAIN MEDICINE

## 2020-07-09 PROCEDURE — 77002 PR FLUOROSCOPIC GUIDANCE NEEDLE PLACEMENT: ICD-10-PCS | Mod: 26,HCNC,, | Performed by: PAIN MEDICINE

## 2020-07-09 PROCEDURE — 25000003 PHARM REV CODE 250: Mod: HCNC | Performed by: PAIN MEDICINE

## 2020-07-09 PROCEDURE — 20610 PR DRAIN/INJECT LARGE JOINT/BURSA: ICD-10-PCS | Mod: 50,HCNC,, | Performed by: PAIN MEDICINE

## 2020-07-09 PROCEDURE — 20610 DRAIN/INJ JOINT/BURSA W/O US: CPT | Mod: 50,HCNC,, | Performed by: PAIN MEDICINE

## 2020-07-09 PROCEDURE — 82962 GLUCOSE BLOOD TEST: CPT | Mod: HCNC | Performed by: PAIN MEDICINE

## 2020-07-09 PROCEDURE — 63600175 PHARM REV CODE 636 W HCPCS: Mod: HCNC | Performed by: PAIN MEDICINE

## 2020-07-09 PROCEDURE — 99152 MOD SED SAME PHYS/QHP 5/>YRS: CPT | Mod: HCNC | Performed by: PAIN MEDICINE

## 2020-07-09 PROCEDURE — 20610 DRAIN/INJ JOINT/BURSA W/O US: CPT | Mod: 50,HCNC | Performed by: PAIN MEDICINE

## 2020-07-09 PROCEDURE — A9585 GADOBUTROL INJECTION: HCPCS | Mod: HCNC | Performed by: PAIN MEDICINE

## 2020-07-09 RX ORDER — MIDAZOLAM HYDROCHLORIDE 1 MG/ML
INJECTION, SOLUTION INTRAMUSCULAR; INTRAVENOUS
Status: DISCONTINUED | OUTPATIENT
Start: 2020-07-09 | End: 2020-07-09 | Stop reason: HOSPADM

## 2020-07-09 RX ORDER — FENTANYL CITRATE 50 UG/ML
INJECTION, SOLUTION INTRAMUSCULAR; INTRAVENOUS
Status: DISCONTINUED | OUTPATIENT
Start: 2020-07-09 | End: 2020-07-09 | Stop reason: HOSPADM

## 2020-07-09 RX ORDER — LIDOCAINE HYDROCHLORIDE 20 MG/ML
INJECTION, SOLUTION EPIDURAL; INFILTRATION; INTRACAUDAL; PERINEURAL
Status: DISCONTINUED | OUTPATIENT
Start: 2020-07-09 | End: 2020-07-09 | Stop reason: HOSPADM

## 2020-07-09 RX ORDER — GADOBUTROL 604.72 MG/ML
INJECTION INTRAVENOUS
Status: DISCONTINUED | OUTPATIENT
Start: 2020-07-09 | End: 2020-07-09 | Stop reason: HOSPADM

## 2020-07-09 RX ORDER — METHYLPREDNISOLONE ACETATE 40 MG/ML
INJECTION, SUSPENSION INTRA-ARTICULAR; INTRALESIONAL; INTRAMUSCULAR; SOFT TISSUE
Status: DISCONTINUED | OUTPATIENT
Start: 2020-07-09 | End: 2020-07-09 | Stop reason: HOSPADM

## 2020-07-09 NOTE — OP NOTE
PROCEDURE:  1) Bilateral glenohumeral joint steroid injection  2) Fluoroscopic needle guidance  REASON FOR PROCEDURE: shoulder osteoarthritis  PHYSICIAN: SCOUT Dias MD  Date: 7/9/2020  MEDICATIONS INJECTED: 1 mL of depomedrol and 3 mL of 1%  lidocaine per side  LOCAL ANESTHETIC INJECTED: 4 mL of 1% lidocaine  SEDATION MEDICATIONS: 1mg midazolam, 25mcg fentanyl  ESTIMATED BLOOD LOSS: None  COMPLICATIONS: None  TECHNIQUE: Time-out was taken to identify the correct patient,  procedure and side prior to starting the procedure. While lying  in the supine position, the patient was prepped and draped in  the usual sterile fashion using DuraPrep and a fenestrated  drape. The target site was determined under fluoroscopy. Local  anesthetic was given by raising a skin wheal and going down to  the hub of a 27-gauge 1.25-inch needle. A 25-gauge, 3.5-inch  Quincke needle was advanced under intermittent fluoroscopy. When  the tip of the needle was thought to be in the appropriate  position along the upper outer humeral head, aspiration was  attempted to check for intravascular placement. Omnipaque 240  was then injected to confirm intraarticular spread, as well as  showing no vascular runoff. Medication was then injected slowly.  The procedure was completed without complications and was  tolerated well.  The same procedure was repeated on the opposite side. The patient was monitored after the procedure.  The patient (or responsible party) was given post-procedure and  discharge instructions to follow at home. The patient was  discharged in stable condition. A follow-up appointment was  made.    SCOUT Dias MD  Interventional Pain Medicine  [facility]

## 2020-07-09 NOTE — DISCHARGE INSTRUCTIONS

## 2020-07-09 NOTE — H&P
Progress Notes  Desean Dias MD (Physician)   Pain Medicine   6/3/2020  2:40 PM   Signed     Chief Pain Complaint:  Low back pain  Right shoulder pain     Interval HPI:  Ms. Felix returns to clinic for follow-up.  She underwent a T12-L1 transforaminal epidural steroid injection on March 10, 2020 and she reports that this has provided significant pain relief for her which radiated into her right leg prior to the injection.  She does report that his symptoms have returned and the injections worn off her pain is currently rated 7/10.  She was scheduled to undergo bilateral lumbar radiofrequency ablation prior to corona virus however she would like to hold off on that at this time and pursue repeat injection in the low back.  Unfortunately she also reports that she had her wheelchair fall over a few weeks ago and this has resulted in bilateral shoulder pain.  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  She finds that rest does provide some pain relief on activity causes her symptoms to worsen.  She has been able to walk significantly more after her most recent injection in March reported she is able to walk several steps before having to sit rest however this has a vast improvement from prior to the injection.     Interval HPI:  Ms. Felix returns to clinic for follow-up.  She underwent bilateral lumbar medial branch blocks on January 31, 2020 and reports 100% symptomatic pain relief for approximately 1 week and his symptoms slowly began to return.  She continues to have some right-sided lower thoracic posterior pain which was not completely dressed with the medial branch blocks.  Today she rates her pain as an 8/10 which is located primarily in the axial lumbar spine and the lower right posterior thoracic region.  She denies having numbness weakness in the legs but does continue to have a constant aching and  throbbing sensation in the low back.     Interval HPI:   Ms. Felix returns to clinic for follow-up.  She reports that she underwent bilateral L4/5 transforaminal epidural steroid injections in November 2019 reports ongoing 80% symptomatic pain relief.  She has also been participating physical therapy which she has found to be very helpful however 4 days ago she started to do some leg raise is in her bed and she felt severe pain in her low back which has not subsided.  She denies having any radiation except for around her flank into her anterior abdomen.  She feels like the pain sometimes comes straight through from her back to her abdomen.  Today she rates her pain as a 10/10 describes it as a constant aching and sharp pain. She has been using a heating pad which is minimally helpful and she has been holding off on physical therapy at this time. She denies having bowel bladder difficulty.     Interval History: Patient was seen on 8/4/19. At that time she underwent bilateral L3/4 TF XANDER.  The patient reports that she is/was better following the procedure.  she reports 75% pain relief.  She had a fall, then pain increased. She is currently living at [facility]. She is doing at home therapy there, which is helping. She is in wheelchair now but hopes to transition to walker soon.  After the fall, she was not able to get out of the bed.      Initial History of Present Illness:   This patient is a 74 y.o. female who presents today complaining of the above noted pain/s. The patient describes the pain as follows.  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  She reports approximately 2 years ago she had to sudden deaths in the family which caused very stressful time for her and resulted in shingles rash in the left V1 distribution however she reports having excruciating pain in the left V1  and V2 distributions.  She denies having difficulty with eating food and brushing her teeth on the left side of her face however the left lateral side of her nose gets her excruciating pain.  She has been tried on numerous medications in the past including gabapentin, Lyrica, Valtrex, Celebrex, ibuprofen which have all provided minimal benefit however steroids have been most helpful.  Today she rates her pain as an 8/10 and describes a constant burning sensation the left side of her face in addition to radiation into her bilateral lower extremities, her right shoulder, her left upper extremity occasionally as a pins and needle sensation.  She does report having numbness and weakness in her left lower extremity.  She has been physical therapy which she completed in February of 2019 however this caused most of her low back and leg symptoms to worsen in addition to her post herpetic neuralgia to worsen.  She denies having bowel bladder difficulties.  Her symptoms are worse with activity such as walking in her somewhat improved with rest however she had finds it difficult to get into a comfortable position. She has been using topical lidocaine patches and applying to the left side of her face which does provide significant benefit.  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     Previous Therapy:  Medications:  Celebrex, ibuprofen, gabapentin, Lyrica, Valtrex, steroids  Injections: bilateral L3/4 TF XANDER on 8/4/19 with 75% pain relief, bilateral L4/5 transforaminal epidural steroid injections with 80% symptomatic pain relief, bilateral L3-5 lumbar medial branch blocks with 100% symptomatic pain relief, T12-L1 transforaminal epidural steroid injection with 100% symptomatic pain relief  Surgeries: None  Physical Therapy: Completed in the Past, currently participating           Past  Surgical History:   Procedure Laterality Date    BRAIN SURGERY        CARDIAC CATHETERIZATION        CORONARY ANGIOPLASTY        EPIDURAL STEROID INJECTION INTO LUMBAR SPINE Bilateral 11/12/2019     Procedure: TF XANDER L4/5;  Surgeon: Desean Dias MD;  Location: [facility];  Service: Pain Management;  Laterality: Bilateral;    HYSTERECTOMY        INJECTION OF ANESTHETIC AGENT AROUND MEDIAL BRANCH NERVES INNERVATING LUMBAR FACET JOINT Bilateral 1/31/2020     Procedure: Bilateral L3-5 MBB;  Surgeon: Desean Dias MD;  Location: [facility];  Service: Pain Management;  Laterality: Bilateral;    TRANSFORAMINAL EPIDURAL INJECTION OF STEROID Bilateral 7/23/2019     Procedure: Bilateral L3/4 Transforaminal Epidural Steroid Injection;  Surgeon: Desean Dias MD;  Location: [facility];  Service: Pain Management;  Laterality: Bilateral;    TRANSFORAMINAL EPIDURAL INJECTION OF STEROID Bilateral 3/10/2020     Procedure: Right T12/L1 TF XANDER with local;  Surgeon: Desean Dias MD;  Location: [facility];  Service: Pain Management;  Laterality: Bilateral;        Imaging / Labs / Studies (reviewed on 6/3/2020):          Results for orders placed during the hospital encounter of 08/04/19   CT Lumbar Spine Without Contrast     Narrative FINDINGS:  Wedge compression deformity of T12 consistent with an old fracture.  No bowing of the posterior cortex.Gas formation within the disc at the L2-3 L3-4 L4-5 and L5-S1 levels.Bilateral facet arthropathy and neural foraminal narrowing seen most significantly at the L3-4 L4-5 and L5-S1 levels with possible bilateral L3-L4 and L5 nerve root impingement in the neural foramina.  13 degree lumbar scoliotic curvature convex to the left side.     Impression Mild lumbar scoliotic curvature convex to the left side with multilevel disc space narrowing seen most significantly at the L3-4 L4-5 and L5-S1 levels.  Bony neural foraminal narrowing with possible nerve root  impingement seen most significantly at the L3-L4 level on the right side with possible right-sided L3 nerve root impingement and on the left side at the L4-5 and L5-S1 levels with possible left-sided L4 and L5 nerve root impingement.  Old compression fracture of the T12 vertebral body.  Please see the prior myelogram dated 07/12/2019 showing almost complete block at the L3-L4 level.  All CT scans at this facility are performed  using dose modulation techniques as appropriate to performed exam including the following:  automated exposure control; adjustment of mA and/or kV according to the patients size (this includes techniques or standardized protocols for targeted exams where dose is matched to indication/reason for exam: i.e. extremities or head);  iterative reconstruction technique.           Results for orders placed during the hospital encounter of 08/04/19   CT Thoracic Spine Without Contrast     Narrative COMPARISON:  None  FINDINGS:  Normal vertebral alignment.  Multilevel degenerative changes of the vertebral endplates seen most significantly at the T7-T8-T8-T9 T9-T10 T10-T11 and the T11-T12 vertebra.  No evidence of an acute compression fracture can be seen.  Cannot exclude an old mild wedge compression deformity of the T12 vertebral body.  Multilevel facet arthropathy.  Multilevel disc space narrowings between T4 and T10. Atherosclerotic calcifications of the abdominal aorta without aneurysmal dilatation.  Multiple subpleural blebs present in the adjacent lungs.     Impression Multilevel degenerative changes as described above.  Mild wedge deformity of the T12 vertebral body consistent with an old compression fracture.  No acute fracture identified.  The patient has had a prior myelogram dated 07/12/2019 showing multiple impingement on the ventral thecal sac secondary to disc disease.  T12 vertebra is similar on the prior study.  All CT scans at this facility are performed  using dose modulation  techniques as appropriate to performed exam including the following:  automated exposure control; adjustment of mA and/or kV according to the patients size (this includes techniques or standardized protocols for targeted exams where dose is matched to indication/reason for exam: i.e. extremities or head);  iterative reconstruction technique.              Results for orders placed during the hospital encounter of 07/12/19   X-Ray Hips Bilateral 2 View Incl AP Pelvis     Narrative FINDINGS:  Degenerative changes of the lumbosacral spine and sacroiliac joints.  Bones are demineralized.  Bilateral total hip arthroplasty changes are noted.  No periprosthetic fracture or hardware complication.           Results for orders placed during the hospital encounter of 08/04/19   X-Ray Hip 2 View Left     Narrative COMPARISON:  07/31/2019  FINDINGS:  Bilateral hip replacements are intact in good alignment.  Left hip appears free of any acute fracture or dislocation.           Results for orders placed during the hospital encounter of 07/31/19   CT Cervical Spine Without Contrast     Narrative FINDINGS:  Osteopenia.  Grade 2 degenerative spondylolisthesis at C3-C4.  Grade 1 degenerative spondylolisthesis at C4-C5.  Vertebral body height is normal.  No acute fractures. No prevertebral soft tissue swelling. Atlanto-occipital articulation is normal.  Congenitally patulous spinal canal.  No significant spinal stenosis.  Moderate left foraminal stenosis at C3-C4.     Impression No acute findings.  Degenerative spondylolisthesis at C3-C4 and C4-C5.  All CT scans at this facility use dose modulation, iterative reconstruction, and/or weight base dosing when appropriate to reduce radiation dose to as low as reasonably achievable.           Results for orders placed during the hospital encounter of 07/12/19   Fl Myelogram 2 Or More Regions     Narrative TECHNIQUE:  After obtaining informed consent from the patient explain the risks,  benefits and alternatives to the procedure the patient was placed on the fluoroscopic table in the prone position. The risks included although not limited to bleeding, infection, contrast/drug reaction, nerve root/cord injury,  CSF leak/spinal headache, seizures and herniation. The L2/3 level within isolated under fluoroscopy. The overlying skin was prepped and draped routine sterile fashion. Approximately 1 cc of a 1% lidocaine was used for local anesthesia. Using a 22-gauge spinal needle access was obtained to the thecal sac be a midline translaminar approach. Following removal of stylet spontaneous visualization of clear cerebrospinal fluid was identified. Following this contrast system was attached and approximately 10 cc of a Omnipaque 300 contrast material was infused under fluoroscopy. Patient tolerated procedure well. No immediate postprocedure complication. The stylet was then reinserted and the  spinal needle system was removed in full. Adhesive bandage was placed over the puncture site. Spot overhead fluoroscopic images in the and lateral projection with additional lateral flexion and extension views.  Total fluoro time was 2.1 minutes and 1 fluoroscopic image was obtained.  COMPARISON:  06/04/2019  FINDINGS:  No complications.  Limited images due to limited mobility.     Impression Successful fluoroscopic myelogram. See dedicated CT myelogram thoracic and lumbar spine.           Results for orders placed during the hospital encounter of 07/12/19   CT Myelography Thoracic Lumbar Spine     Narrative COMPARISON:  None  FINDINGS:  Thoracic spine:  There is no acute fracture of the thoracic spine.  There is an old anterior wedge compression fracture of the T12 vertebral body.  There is a Schmorl's node seen within the superior aspect of the T8 vertebral body.  There is a slight levoscoliosis of the thoracic spine centered at T3.  T1-T2: Unremarkable.  T2-T3: Unremarkable.  T3-T4: There is a disc bulge with  left ventral surface thecal sac effacement but no impingement upon the cord.  T4-T5: Unremarkable.  T5-T6: Unremarkable.  T6-T7: There is a central focal disc protrusion which causes right ventral surface cord impingement.  The disc protrusion measures 7 mm in AP dimension, 14 mm in craniocaudal dimension, and 10 mm in transverse dimension.  T7-T8: Unremarkable.  T8-T9: There is a central disc extrusion with the disc extrusion extending inferiorly from the level of the disc and measuring 19 mm in craniocaudal dimension, 8 mm in transverse dimension, and 5 mm in anterior-posterior dimension.  This disc extrusion slightly impinges upon the ventral surface of the cord.  T9-T10: Unremarkable.  T10-T11: There is a disc extrusion in the central to right central location which is extending superiorly from the disc level and measuring 18 mm in craniocaudal dimension, 5 mm in anterior-posterior dimension, and 8 mm in transverse dimension.  This disc extrusion impinges upon the right ventral surface of the cord.  T11-T12: Minimal disc bulge with ventral surface thecal sac effacement but no cord impingement.  T12-L1: Right central disc protrusion measuring 10 mm in craniocaudal dimension, 8 mm in transverse dimension, and 5 mm in anterior-posterior dimension which causes slight right ventral surface cord remodeling.     Lumbar spine:  No fracture of the lumbar spine.  No paraspinal mass.  The conus medullaris has a normal morphology and terminates at the L1-L2 level.  L1-L2: Mild disc bulge with mild ventral surface thecal sac effacement.  Patent neural foramina.  L2-L3: Degenerative disc disease.  Disc bulge with severe central canal stenosis with the AP diameter at the point of maximal stenosis measuring 7 mm.  Mild bilateral neural foraminal narrowing.  Bilateral facet joint osteoarthritis.  L3-L4: Severe degenerative disc disease.  There is critical central canal stenosis at this level with no contrast-enhanced CSF  identified at the disc level.  There is severe crowding of the nerve roots in the compressed thecal sac at this level.  The disc protrusion extends into the right neural foramen causing severe right neural foraminal stenosis and probable impingement upon the exiting right L3 nerve root.  L4-5: Severe degenerative disc disease.  Disc bulge along with ligamentum flavum thickening which causes severe central canal stenosis with the AP diameter of the thecal sac measuring 8 mm the point of maximal stenosis.  The disc protrusion extends into the bilateral neural foramen causing severe right neural foraminal stenosis and moderate left neural foraminal stenosis.  L5-S1: Degenerative disc disease.  Disc bulge with mild central canal narrowing.  Mild to moderate left neural foraminal stenosis.     Impression 1. There is critical central canal stenosis at L3-L4 due to a disc bulge and ligamentum flavum thickening.  2. Severe right L3-L4 neural foraminal stenosis with probable impingement upon the exiting right L3 nerve root.  3. Severe central canal stenosis at L2-L3 and L4-L5 due to disc bulges.  4. Severe right L4-5 neural foraminal stenosis.  5. Multiple disc protrusions in the thoracic spine as detailed each level with some ventral surface cord impingement seen at the T6-T7, T8-T9, and T10-T11 levels.  All CT scans at this facility are performed  using dose modulation techniques as appropriate to performed exam including the following:  automated exposure control; adjustment of mA and/or kV according to the patients size (this includes techniques or standardized protocols for targeted exams where dose is matched to indication/reason for exam: i.e. extremities or head);  iterative reconstruction technique.            Review of Systems:  Review of Systems   Constitutional: Negative for fever.   HENT:        Left-sided rash of the face   Eyes: Negative for blurred vision.   Respiratory: Negative for cough and wheezing.   "  Cardiovascular: Negative for chest pain and orthopnea.   Gastrointestinal: Negative for constipation, diarrhea, nausea and vomiting.   Genitourinary: Negative for dysuria.   Musculoskeletal: Positive for back pain.   Skin: Negative for itching and rash.   Neurological: Positive for weakness.   Endo/Heme/Allergies: Does not bruise/bleed easily.         Physical Exam:  Vitals:  /78 (BP Location: Right arm, Patient Position: Sitting, BP Method: Medium (Automatic))   Pulse 69   Resp 18   Ht 5' 4" (1.626 m)   Wt 59.4 kg (131 lb)   LMP  (LMP Unknown)   BMI 22.49 kg/m²   (reviewed on 6/3/2020)     General: alert and oriented, in no apparent distress.  Gait: in wheelchair.  Skin: no rashes, no discoloration, no obvious lesions  HEENT: normocephalic, atraumatic. Pupils equal and round.  Cardiovascular: no significant peripheral edema present.  Respiratory: without use of accessory muscles of respiration.     Musculoskeletal - Lumbar Spine:  -severe tenderness to palpation over lower lumbar vertebral body midline and facets bilaterally and; severe tenderness to palpation over right lower thoracic spine and flank     Shoulders; increased pain with all range of motion of bilateral shoulders;     Neuro - Lower Extremities:  - Extremity Reflexes: Brisk and symmetric throughout  - Sensory: Sensation to light touch intact bilaterally      Psych:  Mood and affect is appropriate     Assessment  1. 74 y.o. year old patient with PMH of        Past Medical History:   Diagnosis Date    Abnormal ECG 8/5/2019    Aneurysm      Anticoagulant long-term use      Arthritis      CAD in native artery 8/5/2019    COPD (chronic obstructive pulmonary disease)      Diabetes mellitus      Glaucoma      Hypertension      Seizures      Shingles 05/27/2017    Stroke        presenting with pain located left side of her face, left lower extremity, lumbar spine. Diagnoses include:     ICD-10-CM ICD-9-CM   1. Lumbar radiculopathy " M54.16 724.4   2. Shoulder arthritis M19.019 716.91      2. Pain Generators / Etiology: Lumbar Radiculopathy and Lumbar Spondylosis, post herpetic neuralgia  3. Failed Meds (E- Effective, NE- Not Effective):  Celebrex-minimally effective, ibuprofen-minimally effective, gabapentin-minimally effective, Lyrica-minimally effective, Valtrex-minimally effective, steroid-effective  4. Physical Therapy - Completed in the Past  5. Psychological comorbidities - None  6. Anticoagulants / Antiplatelets: Plavix        Plan:  1. Interventional:   - will proceed with bilateral shoulder joint injections     2. Pharmacologic: Continue Cymbalta 30 mg once daily PRN.  Continue tramadol as prescribed; will provide Medrol Dosepak prescription to help with current symptoms     3. Rehabilitative:  Can continue physical therapy exercises at home as tolerated     4. Diagnostic:  None; reviewed CT scan thoracic and lumbar spine patient today in clinic     5. Follow up:  Follow up in clinic 4 weeks after the injection     - This condition does not require this patient to take time off of work.   - I discussed the risks, benefits, and alternatives to potential treatment options. All questions and concerns were fully addressed today in clinic. Dr. Dias was consulted regarding the patient plan and agrees.     SCOUT Dias MD  Interventional Pain Medicine  [facility] [place]

## 2020-07-09 NOTE — PLAN OF CARE
Patient prepped for procedure. Brother, Rm, in car, will call with updates, he does not respond to texts per patient.

## 2020-07-10 NOTE — DISCHARGE SUMMARY
[facility]  Short Stay  Discharge Summary    Admit Date: 7/9/2020    Discharge Date and Time: 7/9/2020  2:45 PM      Discharge Attending Physician: SCOUT Dias MD     Hospital Course (synopsis of major diagnoses, care, treatment, and services provided during the course of the hospital stay): Patient was admitted to Pre-op where informed consent was signed.  The patient was then taken to the procedure suite where the procedure was performed.  The patient was then return to the Pre-Op area and discharge was performed.     Final Diagnoses:    Principal Problem: <principal problem not specified>   Secondary Diagnoses:   Active Hospital Problems    Diagnosis  POA    Shoulder arthritis [M19.019]  Yes      Resolved Hospital Problems   No resolved problems to display.       Discharged Condition: good    Disposition: Home or Self Care    Follow up/Patient Instructions:    Medications:  Reconciled Home Medications:      Medication List      CONTINUE taking these medications    albuterol 90 mcg/actuation inhaler  Commonly known as: PROVENTIL/VENTOLIN HFA  Inhale 2 puffs into the lungs every 4 (four) hours as needed.     albuterol-ipratropium 2.5 mg-0.5 mg/3 mL nebulizer solution  Commonly known as: DUO-NEB  Take 3 mLs by nebulization every 6 (six) hours as needed for Wheezing. Rescue     amLODIPine 10 MG tablet  Commonly known as: NORVASC  Take 1 tablet (10 mg total) by mouth once daily.     APTIOM 600 mg Tab  Generic drug: eslicarbazepine  TAKE 1 TABLET BY MOUTH EVERY EVENING     atorvastatin 40 MG tablet  Commonly known as: LIPITOR  Take 1 tablet (40 mg total) by mouth once daily.     cholecalciferol (vitamin D3) 1,250 mcg (50,000 unit) capsule  Take 1 capsule (50,000 Units total) by mouth every 7 days.     clopidogreL 75 mg tablet  Commonly known as: PLAVIX  Take 1 tablet (75 mg total) by mouth once daily.     cyclobenzaprine 5 MG tablet  Commonly known as: FLEXERIL  TAKE 1 TABLET BY MOUTH THREE TIMES  DAILY AS NEEDED FOR MUSCLE SPASMS     DALIRESP 500 mcg Tab  Generic drug: roflumilast  Take 1 tablet by mouth once daily.     dexlansoprazole 60 mg capsule  Commonly known as: DEXILANT  Take 1 capsule (60 mg total) by mouth once daily.     diltiaZEM 120 mg 24 hr tablet  Commonly known as: CARDIZEM LA  Take 1 tablet (120 mg total) by mouth once daily.     DULoxetine 30 MG capsule  Commonly known as: CYMBALTA  Take 1 capsule (30 mg total) by mouth once daily.     fluticasone furoate-vilanteroL 200-25 mcg/dose Dsdv diskus inhaler  Commonly known as: BREO ELLIPTA  Inhale 1 puff into the lungs every evening.     fluticasone propionate 50 mcg/actuation nasal spray  Commonly known as: FLONASE  1 spray (50 mcg total) by Each Nostril route once daily.     gemfibroziL 600 MG tablet  Commonly known as: LOPID  Take 1 tablet (600 mg total) by mouth once daily.     guaifenesin 100 mg/5 ml 100 mg/5 mL syrup  Commonly known as: ROBITUSSIN  Take 200 mg by mouth as needed for Cough.     loratadine 10 mg tablet  Commonly known as: CLARITIN  Take 1 tablet by mouth once daily.     MELATONIN ORAL  Take by mouth nightly as needed.     metFORMIN 500 MG tablet  Commonly known as: GLUCOPHAGE  Take 1 tablet (500 mg total) by mouth 2 (two) times daily.     metoprolol tartrate 25 MG tablet  Commonly known as: LOPRESSOR  Take 1 tablet (25 mg total) by mouth 2 (two) times daily. TAKE 1 TABLET BY MOUTH TWICE DAILY WITH FOOD FOR HEART AND BLOOD PRESSURE     montelukast 10 mg tablet  Commonly known as: SINGULAIR  Take 1 tablet (10 mg total) by mouth once daily.     mupirocin 2 % ointment  Commonly known as: BACTROBAN  Apply topically 3 (three) times daily.     PROBIOTIC ACIDOPHILUS ORAL  Take 1 capsule by mouth 2 (two) times daily.     PROLIA 60 mg/mL Syrg  Generic drug: denosumab  every 6 (six) months.     ranolazine 1,000 mg Tb12  Commonly known as: RANEXA  Take 1 tablet (1,000 mg total) by mouth 2 (two) times daily.     SENNA 8.6 mg Cap  Generic  drug: sennosides  Take by mouth as needed.     SPIRIVA WITH HANDIHALER 18 mcg inhalation capsule  Generic drug: tiotropium  Inhale 1 capsule into the lungs once daily.     tolterodine 4 MG 24 hr capsule  Commonly known as: DETROL LA  Take 1 capsule (4 mg total) by mouth once daily.     traMADoL 50 mg tablet  Commonly known as: ULTRAM  Take 1 tablet (50 mg total) by mouth every 6 (six) hours as needed.     valsartan 320 MG tablet  Commonly known as: DIOVAN  Take 1 tablet (320 mg total) by mouth once daily.     venlafaxine 150 mg Tr24  Take 150 mg by mouth once daily.        ASK your doctor about these medications    escitalopram oxalate 20 MG tablet  Commonly known as: LEXAPRO  Take 1 tablet (20 mg total) by mouth once daily.     meloxicam 7.5 MG tablet  Commonly known as: MOBIC  Take 1 tablet (7.5 mg total) by mouth once daily.     methylPREDNISolone 4 mg tablet  Commonly known as: MEDROL DOSEPACK  use as directed     neomycin-polymyxin-hydrocortisone 3.5-10,000-1 mg/mL-unit/mL-% otic suspension  Commonly known as: CORTISPORIN  Place 4 drops into the right ear 4 (four) times daily.          Discharge Procedure Orders   Diet general     Call MD for:  severe uncontrolled pain     Call MD for:  difficulty breathing, headache or visual disturbances     Call MD for:  redness, tenderness, or signs of infection (pain, swelling, redness, odor or green/yellow discharge around incision site)     Activity as tolerated

## 2020-07-13 DIAGNOSIS — I25.118 CORONARY ARTERY DISEASE OF NATIVE ARTERY OF NATIVE HEART WITH STABLE ANGINA PECTORIS: ICD-10-CM

## 2020-07-13 RX ORDER — ROFLUMILAST 500 UG/1
1 TABLET ORAL DAILY
Qty: 30 TABLET | Refills: 0 | Status: SHIPPED | OUTPATIENT
Start: 2020-07-13 | End: 2020-09-14

## 2020-07-13 RX ORDER — CLOPIDOGREL BISULFATE 75 MG/1
75 TABLET ORAL DAILY
Qty: 30 TABLET | Refills: 6 | Status: SHIPPED | OUTPATIENT
Start: 2020-07-13 | End: 2020-12-29

## 2020-07-22 ENCOUNTER — PATIENT OUTREACH (OUTPATIENT)
Dept: ADMINISTRATIVE | Facility: OTHER | Age: 75
End: 2020-07-22

## 2020-07-23 DIAGNOSIS — I25.10 CORONARY ARTERY DISEASE, ANGINA PRESENCE UNSPECIFIED, UNSPECIFIED VESSEL OR LESION TYPE, UNSPECIFIED WHETHER NATIVE OR TRANSPLANTED HEART: Primary | ICD-10-CM

## 2020-07-24 ENCOUNTER — HOSPITAL ENCOUNTER (OUTPATIENT)
Dept: RADIOLOGY | Facility: HOSPITAL | Age: 75
Discharge: HOME | End: 2020-07-24
Attending: PHYSICIAN ASSISTANT
Payer: MEDICARE

## 2020-07-24 ENCOUNTER — TELEPHONE (OUTPATIENT)
Dept: PAIN MEDICINE | Facility: CLINIC | Age: 75
End: 2020-07-24

## 2020-07-24 ENCOUNTER — OFFICE VISIT (OUTPATIENT)
Dept: PAIN MEDICINE | Facility: CLINIC | Age: 75
End: 2020-07-24
Payer: MEDICARE

## 2020-07-24 ENCOUNTER — PATIENT MESSAGE (OUTPATIENT)
Dept: PAIN MEDICINE | Facility: CLINIC | Age: 75
End: 2020-07-24

## 2020-07-24 VITALS
SYSTOLIC BLOOD PRESSURE: 162 MMHG | RESPIRATION RATE: 18 BRPM | HEIGHT: 64 IN | WEIGHT: 132 LBS | BODY MASS INDEX: 22.53 KG/M2 | HEART RATE: 116 BPM | DIASTOLIC BLOOD PRESSURE: 89 MMHG

## 2020-07-24 DIAGNOSIS — R09.89 SUSPECTED DEEP VEIN THROMBOSIS (DVT): ICD-10-CM

## 2020-07-24 DIAGNOSIS — G89.29 CHRONIC RIGHT SHOULDER PAIN: ICD-10-CM

## 2020-07-24 DIAGNOSIS — I74.9 EMBOLISM AND THROMBOSIS OF UNSPECIFIED ARTERY: ICD-10-CM

## 2020-07-24 DIAGNOSIS — S40.021A ARM BRUISE, RIGHT, INITIAL ENCOUNTER: ICD-10-CM

## 2020-07-24 DIAGNOSIS — M25.619 LIMITED RANGE OF MOTION (ROM) OF SHOULDER: ICD-10-CM

## 2020-07-24 DIAGNOSIS — M25.511 CHRONIC RIGHT SHOULDER PAIN: ICD-10-CM

## 2020-07-24 DIAGNOSIS — M25.511 ACUTE PAIN OF RIGHT SHOULDER: Primary | ICD-10-CM

## 2020-07-24 PROCEDURE — 1159F PR MEDICATION LIST DOCUMENTED IN MEDICAL RECORD: ICD-10-PCS | Mod: HCNC,S$GLB,, | Performed by: PHYSICIAN ASSISTANT

## 2020-07-24 PROCEDURE — 1101F PT FALLS ASSESS-DOCD LE1/YR: CPT | Mod: HCNC,CPTII,S$GLB, | Performed by: PHYSICIAN ASSISTANT

## 2020-07-24 PROCEDURE — 99999 PR PBB SHADOW E&M-EST. PATIENT-LVL V: CPT | Mod: PBBFAC,HCNC,, | Performed by: PHYSICIAN ASSISTANT

## 2020-07-24 PROCEDURE — 96372 THER/PROPH/DIAG INJ SC/IM: CPT | Mod: HCNC,S$GLB,, | Performed by: PHYSICIAN ASSISTANT

## 2020-07-24 PROCEDURE — 1125F PR PAIN SEVERITY QUANTIFIED, PAIN PRESENT: ICD-10-PCS | Mod: HCNC,S$GLB,, | Performed by: PHYSICIAN ASSISTANT

## 2020-07-24 PROCEDURE — 3077F SYST BP >= 140 MM HG: CPT | Mod: HCNC,CPTII,S$GLB, | Performed by: PHYSICIAN ASSISTANT

## 2020-07-24 PROCEDURE — 93971 EXTREMITY STUDY: CPT | Mod: 26,HCNC,RT, | Performed by: RADIOLOGY

## 2020-07-24 PROCEDURE — 1159F MED LIST DOCD IN RCRD: CPT | Mod: HCNC,S$GLB,, | Performed by: PHYSICIAN ASSISTANT

## 2020-07-24 PROCEDURE — 99214 PR OFFICE/OUTPT VISIT, EST, LEVL IV, 30-39 MIN: ICD-10-PCS | Mod: HCNC,25,S$GLB, | Performed by: PHYSICIAN ASSISTANT

## 2020-07-24 PROCEDURE — 3079F PR MOST RECENT DIASTOLIC BLOOD PRESSURE 80-89 MM HG: ICD-10-PCS | Mod: HCNC,CPTII,S$GLB, | Performed by: PHYSICIAN ASSISTANT

## 2020-07-24 PROCEDURE — 99999 PR PBB SHADOW E&M-EST. PATIENT-LVL V: ICD-10-PCS | Mod: PBBFAC,HCNC,, | Performed by: PHYSICIAN ASSISTANT

## 2020-07-24 PROCEDURE — 93971 US UPPER EXTREMITY VEINS RIGHT: ICD-10-PCS | Mod: 26,HCNC,RT, | Performed by: RADIOLOGY

## 2020-07-24 PROCEDURE — 1125F AMNT PAIN NOTED PAIN PRSNT: CPT | Mod: HCNC,S$GLB,, | Performed by: PHYSICIAN ASSISTANT

## 2020-07-24 PROCEDURE — 99214 OFFICE O/P EST MOD 30 MIN: CPT | Mod: HCNC,25,S$GLB, | Performed by: PHYSICIAN ASSISTANT

## 2020-07-24 PROCEDURE — 96372 PR INJECTION,THERAP/PROPH/DIAG2ST, IM OR SUBCUT: ICD-10-PCS | Mod: HCNC,S$GLB,, | Performed by: PHYSICIAN ASSISTANT

## 2020-07-24 PROCEDURE — 3079F DIAST BP 80-89 MM HG: CPT | Mod: HCNC,CPTII,S$GLB, | Performed by: PHYSICIAN ASSISTANT

## 2020-07-24 PROCEDURE — 1101F PR PT FALLS ASSESS DOC 0-1 FALLS W/OUT INJ PAST YR: ICD-10-PCS | Mod: HCNC,CPTII,S$GLB, | Performed by: PHYSICIAN ASSISTANT

## 2020-07-24 PROCEDURE — 3077F PR MOST RECENT SYSTOLIC BLOOD PRESSURE >= 140 MM HG: ICD-10-PCS | Mod: HCNC,CPTII,S$GLB, | Performed by: PHYSICIAN ASSISTANT

## 2020-07-24 PROCEDURE — 93971 EXTREMITY STUDY: CPT | Mod: TC,HCNC,RT

## 2020-07-24 RX ORDER — KETOROLAC TROMETHAMINE 30 MG/ML
30 INJECTION, SOLUTION INTRAMUSCULAR; INTRAVENOUS
Status: DISCONTINUED | OUTPATIENT
Start: 2020-07-24 | End: 2020-07-24

## 2020-07-24 RX ORDER — METHYLPREDNISOLONE ACETATE 40 MG/ML
40 INJECTION, SUSPENSION INTRA-ARTICULAR; INTRALESIONAL; INTRAMUSCULAR; SOFT TISSUE
Status: COMPLETED | OUTPATIENT
Start: 2020-07-24 | End: 2020-07-24

## 2020-07-24 RX ORDER — METHYLPREDNISOLONE ACETATE 40 MG/ML
40 INJECTION, SUSPENSION INTRA-ARTICULAR; INTRALESIONAL; INTRAMUSCULAR; SOFT TISSUE
Status: CANCELLED | OUTPATIENT
Start: 2020-07-24

## 2020-07-24 RX ADMIN — METHYLPREDNISOLONE ACETATE 40 MG: 40 INJECTION, SUSPENSION INTRA-ARTICULAR; INTRALESIONAL; INTRAMUSCULAR; SOFT TISSUE at 12:07

## 2020-07-24 NOTE — PROGRESS NOTES
Chief Pain Complaint:  Low back pain  Right shoulder pain    Interval History (7/24/2020): S/p right T12/L1 TF XANDER 01/06/1920 with 75% pain relief. S/p bilateral shoulder injection on 07/09/2020 with 90% relief on the left side, 0% relief on the right.  She has no range of motion on the right shoulder.  She has never seen Orthopedics.  She reports in the past she was supposed to have a right shoulder MRI, but this was never scheduled. she states that during physical therapy they told her that she needed to have this before she could come back to therapy.    Interval HPI:  Ms. Felix returns to clinic for follow-up.  She underwent a T12-L1 transforaminal epidural steroid injection on March 10, 2020 and she reports that this has provided significant pain relief for her which radiated into her right leg prior to the injection.  She does report that his symptoms have returned and the injections worn off her pain is currently rated 7/10.  She was scheduled to undergo bilateral lumbar radiofrequency ablation prior to corona virus however she would like to hold off on that at this time and pursue repeat injection in the low back.  Unfortunately she also reports that she had her wheelchair fall over a few weeks ago and this has resulted in bilateral shoulder pain.  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  She finds that rest does provide some pain relief on activity causes her symptoms to worsen.  She has been able to walk significantly more after her most recent injection in March reported she is able to walk several steps before having to sit rest however this has a vast improvement from prior to the injection.    Interval HPI:  Ms. Felix returns to clinic for follow-up.  She underwent bilateral lumbar medial branch blocks on January 31, 2020 and reports 100% symptomatic pain relief for approximately 1 week and his  symptoms slowly began to return.  She continues to have some right-sided lower thoracic posterior pain which was not completely dressed with the medial branch blocks.  Today she rates her pain as an 8/10 which is located primarily in the axial lumbar spine and the lower right posterior thoracic region.  She denies having numbness weakness in the legs but does continue to have a constant aching and throbbing sensation in the low back.    Interval HPI:   Ms. Felix returns to clinic for follow-up.  She reports that she underwent bilateral L4/5 transforaminal epidural steroid injections in November 2019 reports ongoing 80% symptomatic pain relief.  She has also been participating physical therapy which she has found to be very helpful however 4 days ago she started to do some leg raise is in her bed and she felt severe pain in her low back which has not subsided.  She denies having any radiation except for around her flank into her anterior abdomen.  She feels like the pain sometimes comes straight through from her back to her abdomen.  Today she rates her pain as a 10/10 describes it as a constant aching and sharp pain. She has been using a heating pad which is minimally helpful and she has been holding off on physical therapy at this time. She denies having bowel bladder difficulty.    Interval History: Patient was seen on 8/4/19. At that time she underwent bilateral L3/4 TF XANDER.  The patient reports that she is/was better following the procedure.  she reports 75% pain relief.  She had a fall, then pain increased. She is currently living at [facility]. She is doing at home therapy there, which is helping. She is in wheelchair now but hopes to transition to walker soon.  After the fall, she was not able to get out of the bed.     Initial History of Present Illness:   This patient is a 74 y.o. female who presents today complaining of the above noted pain/s. The patient describes the pain as follows.  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  She reports approximately 2 years ago she had to sudden deaths in the family which caused very stressful time for her and resulted in shingles rash in the left V1 distribution however she reports having excruciating pain in the left V1 and V2 distributions.  She denies having difficulty with eating food and brushing her teeth on the left side of her face however the left lateral side of her nose gets her excruciating pain.  She has been tried on numerous medications in the past including gabapentin, Lyrica, Valtrex, Celebrex, ibuprofen which have all provided minimal benefit however steroids have been most helpful.  Today she rates her pain as an 8/10 and describes a constant burning sensation the left side of her face in addition to radiation into her bilateral lower extremities, her right shoulder, her left upper extremity occasionally as a pins and needle sensation.  She does report having numbness and weakness in her left lower extremity.  She has been physical therapy which she completed in February of 2019 however this caused most of her low back and leg symptoms to worsen in addition to her post herpetic neuralgia to worsen.  She denies having bowel bladder difficulties.  Her symptoms are worse with activity such as walking in her somewhat improved with rest however she had finds it difficult to get into a comfortable position. She has been using topical lidocaine patches and applying to the left side of her face which does provide significant benefit.  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    Previous Therapy:  Medications:  Celebrex, ibuprofen, gabapentin, Lyrica, Valtrex, steroids  Injections: bilateral  L3/4 TF XANDER on 8/4/19 with 75% pain relief, bilateral L4/5 transforaminal epidural steroid injections with 80% symptomatic pain relief, bilateral L3-5 lumbar medial branch blocks with 100% symptomatic pain relief, T12-L1 transforaminal epidural steroid injection with 100% symptomatic pain relief, right T12/L1 TF XANDER 01/06/1920 with 75% pain relief, bilateral shoulder injection on 07/09/2020 with 90% relief on the left side, 0% relief on the right  Surgeries: None  Physical Therapy: Completed in the Past, currently participating    Past Surgical History:   Procedure Laterality Date    BRAIN SURGERY      CARDIAC CATHETERIZATION      CORONARY ANGIOPLASTY      EPIDURAL STEROID INJECTION INTO LUMBAR SPINE Bilateral 11/12/2019    Procedure: TF XANDER L4/5;  Surgeon: Desean Dias MD;  Location: [facility];  Service: Pain Management;  Laterality: Bilateral;    HYSTERECTOMY      INJECTION OF ANESTHETIC AGENT AROUND MEDIAL BRANCH NERVES INNERVATING LUMBAR FACET JOINT Bilateral 1/31/2020    Procedure: Bilateral L3-5 MBB;  Surgeon: Desean Dias MD;  [facility];  Service: Pain Management;  Laterality: Bilateral;    INJECTION OF JOINT Bilateral 7/9/2020    Procedure: Bilateral shoulder GH Joint injection with local;  Surgeon: Desean Dias MD;  Location: [facility];  Service: Pain Management;  Laterality: Bilateral;    TRANSFORAMINAL EPIDURAL INJECTION OF STEROID Bilateral 7/23/2019    Procedure: Bilateral L3/4 Transforaminal Epidural Steroid Injection;  Surgeon: Desean Dias MD;  Location: [facility];  Service: Pain Management;  Laterality: Bilateral;    TRANSFORAMINAL EPIDURAL INJECTION OF STEROID Bilateral 3/10/2020    Procedure: Right T12/L1 TF XANDER with local;  Surgeon: Desean Dias MD;  Location: [facility];  Service: Pain Management;  Laterality: Bilateral;    TRANSFORAMINAL EPIDURAL INJECTION OF STEROID Right 6/19/2020    Procedure: Right T12/L1 TFESI Covid day of  procedure;  Surgeon: Desean Dias MD;  Location: [facility];  Service: Pain Management;  Laterality: Right;       Imaging / Labs / Studies (reviewed on 7/24/2020):    Results for orders placed during the hospital encounter of 08/04/19   CT Lumbar Spine Without Contrast    Narrative FINDINGS:  Wedge compression deformity of T12 consistent with an old fracture.  No bowing of the posterior cortex.Gas formation within the disc at the L2-3 L3-4 L4-5 and L5-S1 levels.Bilateral facet arthropathy and neural foraminal narrowing seen most significantly at the L3-4 L4-5 and L5-S1 levels with possible bilateral L3-L4 and L5 nerve root impingement in the neural foramina.  13 degree lumbar scoliotic curvature convex to the left side.    Impression Mild lumbar scoliotic curvature convex to the left side with multilevel disc space narrowing seen most significantly at the L3-4 L4-5 and L5-S1 levels.  Bony neural foraminal narrowing with possible nerve root impingement seen most significantly at the L3-L4 level on the right side with possible right-sided L3 nerve root impingement and on the left side at the L4-5 and L5-S1 levels with possible left-sided L4 and L5 nerve root impingement.  Old compression fracture of the T12 vertebral body.  Please see the prior myelogram dated 07/12/2019 showing almost complete block at the L3-L4 level.  All CT scans at this facility are performed  using dose modulation techniques as appropriate to performed exam including the following:  automated exposure control; adjustment of mA and/or kV according to the patients size (this includes techniques or standardized protocols for targeted exams where dose is matched to indication/reason for exam: i.e. extremities or head);  iterative reconstruction technique.     Results for orders placed during the hospital encounter of 08/04/19   CT Thoracic Spine Without Contrast    Narrative COMPARISON:  None  FINDINGS:  Normal vertebral alignment.   Multilevel degenerative changes of the vertebral endplates seen most significantly at the T7-T8-T8-T9 T9-T10 T10-T11 and the T11-T12 vertebra.  No evidence of an acute compression fracture can be seen.  Cannot exclude an old mild wedge compression deformity of the T12 vertebral body.  Multilevel facet arthropathy.  Multilevel disc space narrowings between T4 and T10. Atherosclerotic calcifications of the abdominal aorta without aneurysmal dilatation.  Multiple subpleural blebs present in the adjacent lungs.    Impression Multilevel degenerative changes as described above.  Mild wedge deformity of the T12 vertebral body consistent with an old compression fracture.  No acute fracture identified.  The patient has had a prior myelogram dated 07/12/2019 showing multiple impingement on the ventral thecal sac secondary to disc disease.  T12 vertebra is similar on the prior study.  All CT scans at this facility are performed  using dose modulation techniques as appropriate to performed exam including the following:  automated exposure control; adjustment of mA and/or kV according to the patients size (this includes techniques or standardized protocols for targeted exams where dose is matched to indication/reason for exam: i.e. extremities or head);  iterative reconstruction technique.       Results for orders placed during the hospital encounter of 07/12/19   X-Ray Hips Bilateral 2 View Incl AP Pelvis    Narrative FINDINGS:  Degenerative changes of the lumbosacral spine and sacroiliac joints.  Bones are demineralized.  Bilateral total hip arthroplasty changes are noted.  No periprosthetic fracture or hardware complication.     Results for orders placed during the hospital encounter of 08/04/19   X-Ray Hip 2 View Left    Narrative COMPARISON:  07/31/2019  FINDINGS:  Bilateral hip replacements are intact in good alignment.  Left hip appears free of any acute fracture or dislocation.     Results for orders placed during the  hospital encounter of 07/31/19   CT Cervical Spine Without Contrast    Narrative FINDINGS:  Osteopenia.  Grade 2 degenerative spondylolisthesis at C3-C4.  Grade 1 degenerative spondylolisthesis at C4-C5.  Vertebral body height is normal.  No acute fractures. No prevertebral soft tissue swelling. Atlanto-occipital articulation is normal.  Congenitally patulous spinal canal.  No significant spinal stenosis.  Moderate left foraminal stenosis at C3-C4.    Impression No acute findings.  Degenerative spondylolisthesis at C3-C4 and C4-C5.  All CT scans at this facility use dose modulation, iterative reconstruction, and/or weight base dosing when appropriate to reduce radiation dose to as low as reasonably achievable.     Results for orders placed during the hospital encounter of 07/12/19   Fl Myelogram 2 Or More Regions    Narrative TECHNIQUE:  After obtaining informed consent from the patient explain the risks, benefits and alternatives to the procedure the patient was placed on the fluoroscopic table in the prone position. The risks included although not limited to bleeding, infection, contrast/drug reaction, nerve root/cord injury,  CSF leak/spinal headache, seizures and herniation. The L2/3 level within isolated under fluoroscopy. The overlying skin was prepped and draped routine sterile fashion. Approximately 1 cc of a 1% lidocaine was used for local anesthesia. Using a 22-gauge spinal needle access was obtained to the thecal sac be a midline translaminar approach. Following removal of stylet spontaneous visualization of clear cerebrospinal fluid was identified. Following this contrast system was attached and approximately 10 cc of a Omnipaque 300 contrast material was infused under fluoroscopy. Patient tolerated procedure well. No immediate postprocedure complication. The stylet was then reinserted and the  spinal needle system was removed in full. Adhesive bandage was placed over the puncture site. Spot overhead  fluoroscopic images in the and lateral projection with additional lateral flexion and extension views.  Total fluoro time was 2.1 minutes and 1 fluoroscopic image was obtained.  COMPARISON:  06/04/2019  FINDINGS:  No complications.  Limited images due to limited mobility.    Impression Successful fluoroscopic myelogram. See dedicated CT myelogram thoracic and lumbar spine.     Results for orders placed during the hospital encounter of 07/12/19   CT Myelography Thoracic Lumbar Spine    Narrative COMPARISON:  None  FINDINGS:  Thoracic spine:  There is no acute fracture of the thoracic spine.  There is an old anterior wedge compression fracture of the T12 vertebral body.  There is a Schmorl's node seen within the superior aspect of the T8 vertebral body.  There is a slight levoscoliosis of the thoracic spine centered at T3.  T1-T2: Unremarkable.  T2-T3: Unremarkable.  T3-T4: There is a disc bulge with left ventral surface thecal sac effacement but no impingement upon the cord.  T4-T5: Unremarkable.  T5-T6: Unremarkable.  T6-T7: There is a central focal disc protrusion which causes right ventral surface cord impingement.  The disc protrusion measures 7 mm in AP dimension, 14 mm in craniocaudal dimension, and 10 mm in transverse dimension.  T7-T8: Unremarkable.  T8-T9: There is a central disc extrusion with the disc extrusion extending inferiorly from the level of the disc and measuring 19 mm in craniocaudal dimension, 8 mm in transverse dimension, and 5 mm in anterior-posterior dimension.  This disc extrusion slightly impinges upon the ventral surface of the cord.  T9-T10: Unremarkable.  T10-T11: There is a disc extrusion in the central to right central location which is extending superiorly from the disc level and measuring 18 mm in craniocaudal dimension, 5 mm in anterior-posterior dimension, and 8 mm in transverse dimension.  This disc extrusion impinges upon the right ventral surface of the cord.  T11-T12: Minimal  disc bulge with ventral surface thecal sac effacement but no cord impingement.  T12-L1: Right central disc protrusion measuring 10 mm in craniocaudal dimension, 8 mm in transverse dimension, and 5 mm in anterior-posterior dimension which causes slight right ventral surface cord remodeling.    Lumbar spine:  No fracture of the lumbar spine.  No paraspinal mass.  The conus medullaris has a normal morphology and terminates at the L1-L2 level.  L1-L2: Mild disc bulge with mild ventral surface thecal sac effacement.  Patent neural foramina.  L2-L3: Degenerative disc disease.  Disc bulge with severe central canal stenosis with the AP diameter at the point of maximal stenosis measuring 7 mm.  Mild bilateral neural foraminal narrowing.  Bilateral facet joint osteoarthritis.  L3-L4: Severe degenerative disc disease.  There is critical central canal stenosis at this level with no contrast-enhanced CSF identified at the disc level.  There is severe crowding of the nerve roots in the compressed thecal sac at this level.  The disc protrusion extends into the right neural foramen causing severe right neural foraminal stenosis and probable impingement upon the exiting right L3 nerve root.  L4-5: Severe degenerative disc disease.  Disc bulge along with ligamentum flavum thickening which causes severe central canal stenosis with the AP diameter of the thecal sac measuring 8 mm the point of maximal stenosis.  The disc protrusion extends into the bilateral neural foramen causing severe right neural foraminal stenosis and moderate left neural foraminal stenosis.  L5-S1: Degenerative disc disease.  Disc bulge with mild central canal narrowing.  Mild to moderate left neural foraminal stenosis.    Impression 1. There is critical central canal stenosis at L3-L4 due to a disc bulge and ligamentum flavum thickening.  2. Severe right L3-L4 neural foraminal stenosis with probable impingement upon the exiting right L3 nerve root.  3. Severe  "central canal stenosis at L2-L3 and L4-L5 due to disc bulges.  4. Severe right L4-5 neural foraminal stenosis.  5. Multiple disc protrusions in the thoracic spine as detailed each level with some ventral surface cord impingement seen at the T6-T7, T8-T9, and T10-T11 levels.  All CT scans at this facility are performed  using dose modulation techniques as appropriate to performed exam including the following:  automated exposure control; adjustment of mA and/or kV according to the patients size (this includes techniques or standardized protocols for targeted exams where dose is matched to indication/reason for exam: i.e. extremities or head);  iterative reconstruction technique.         Review of Systems:  Review of Systems   Constitutional: Negative for fever.   HENT:        Left-sided rash of the face   Eyes: Negative for blurred vision.   Respiratory: Negative for cough and wheezing.    Cardiovascular: Negative for chest pain and orthopnea.   Gastrointestinal: Negative for constipation, diarrhea, nausea and vomiting.   Genitourinary: Negative for dysuria.   Musculoskeletal: Positive for back pain.   Skin: Negative for itching and rash.   Neurological: Positive for weakness.   Endo/Heme/Allergies: Does not bruise/bleed easily.       Physical Exam:  Vitals:  BP (!) 162/89 (BP Location: Left arm, Patient Position: Sitting, BP Method: Medium (Automatic))   Pulse (!) 116   Resp 18   Ht 5' 4" (1.626 m)   Wt 59.9 kg (132 lb)   LMP  (LMP Unknown)   BMI 22.66 kg/m²   (reviewed on 7/24/2020)    General: alert and oriented, in no apparent distress.  Gait: in wheelchair.  Skin: no rashes, no obvious lesions      HEENT: normocephalic, atraumatic. Pupils equal and round.  Cardiovascular: no significant peripheral edema present.  Respiratory: without use of accessory muscles of respiration.    Musculoskeletal - Lumbar Spine:  -severe tenderness to palpation over lower lumbar vertebral body midline and facets bilaterally " and; severe tenderness to palpation over right lower thoracic spine and flank    Right Shoulder:  - Pain on abduction: Present   - ROM: decreased secondary to pain          - abduction beyond 90 degrees (supraspinatous):  Unable to abduct  - TTP over the AC and GH joint: Present  - Neer's: Positive  - Hawkin's: Positive  - Apley's Scratch test: Positive    Left shoulder:  -full ROM  -no pain with abduction    Neuro - Lower Extremities:  - Extremity Reflexes: Brisk and symmetric throughout  - Sensory: Sensation to light touch intact bilaterally      Psych:  Mood and affect is appropriate        Assessment  1. 74 y.o. year old patient with PMH of   Past Medical History:   Diagnosis Date    Abnormal ECG 8/5/2019    Aneurysm     Anticoagulant long-term use     Arthritis     CAD in native artery 8/5/2019    COPD (chronic obstructive pulmonary disease)     Diabetes mellitus     Glaucoma     Hypertension     Seizures     Shingles 05/27/2017    Stroke       presenting with pain located left side of her face, left lower extremity, lumbar spine. Diagnoses include:    ICD-10-CM ICD-9-CM   1. Acute pain of right shoulder  M25.511 719.41   2. Arm bruise, right, initial encounter  S40.021A 923.9   3. Suspected deep vein thrombosis (DVT)  R09.89 785.9   4. Embolism and thrombosis of unspecified artery   I74.9 444.9   5. Chronic right shoulder pain  M25.511 719.41    G89.29 338.29   6. Limited range of motion (ROM) of shoulder  M25.619 719.51      2. Pain Generators / Etiology: Lumbar Radiculopathy and Lumbar Spondylosis, post herpetic neuralgia  3. Failed Meds (E- Effective, NE- Not Effective):  Celebrex-minimally effective, ibuprofen-minimally effective, gabapentin-minimally effective, Lyrica-minimally effective, Valtrex-minimally effective, steroid-effective  4. Physical Therapy - Completed in the Past  5. Psychological comorbidities - None  6. Anticoagulants / Antiplatelets: Plavix      Plan:  1. Interventional:   -  IM methylprednisolone injection 40 milligrams/milliliter given in clinic today.  This was well tolerated.  - S/p right T12/L1 TF XANDER 01/06/1920 with 75% pain relief, bilateral shoulder injection on 07/09/2020 with 90% relief on the left side, 0% relief on the right.     2. Pharmacologic: Continue Cymbalta 30 mg once daily PRN.  Continue tramadol as prescribed.    3. Rehabilitative:  Can continue physical therapy exercises at home as tolerated.    4. Diagnostic:  Order right upper extremity ultrasound to rule out DVT.    5. Other:    - Refer to Orthopedics for surgical evaluation of right shoulder.  Will not order MRI before, will let them order appropriate testing.  - Refer to hematology/oncology for evaluation of hematoma and frequent large bruises.    6. Follow up:  PRN     - I discussed the risks, benefits, and alternatives to potential treatment options. All questions and concerns were fully addressed today in clinic.           Disclaimer:  This note was prepared using voice recognition system and is likely to have sound alike errors that may have been overlooked even after proof reading.  Please call me with any questions.

## 2020-07-24 NOTE — TELEPHONE ENCOUNTER
Patient wanted same day appointment for pain in right shoulder patient thinks it might be infected from injection was able to scheduled patient with EVAN Lott at the [facility] location.  All questions answered//dp

## 2020-07-27 ENCOUNTER — OFFICE VISIT (OUTPATIENT)
Dept: ORTHOPEDICS | Facility: CLINIC | Age: 75
End: 2020-07-27
Payer: MEDICARE

## 2020-07-27 ENCOUNTER — HOSPITAL ENCOUNTER (OUTPATIENT)
Dept: RADIOLOGY | Facility: HOSPITAL | Age: 75
Discharge: HOME | End: 2020-07-27
Attending: PHYSICIAN ASSISTANT
Payer: MEDICARE

## 2020-07-27 VITALS
WEIGHT: 132 LBS | HEIGHT: 64 IN | HEART RATE: 94 BPM | SYSTOLIC BLOOD PRESSURE: 130 MMHG | DIASTOLIC BLOOD PRESSURE: 68 MMHG | BODY MASS INDEX: 22.53 KG/M2

## 2020-07-27 DIAGNOSIS — M25.511 RIGHT SHOULDER PAIN, UNSPECIFIED CHRONICITY: Primary | ICD-10-CM

## 2020-07-27 DIAGNOSIS — G89.29 CHRONIC RIGHT SHOULDER PAIN: ICD-10-CM

## 2020-07-27 DIAGNOSIS — M25.511 CHRONIC RIGHT SHOULDER PAIN: ICD-10-CM

## 2020-07-27 DIAGNOSIS — M25.511 RIGHT SHOULDER PAIN, UNSPECIFIED CHRONICITY: ICD-10-CM

## 2020-07-27 DIAGNOSIS — M79.641 PAIN IN BOTH HANDS: Primary | ICD-10-CM

## 2020-07-27 DIAGNOSIS — M67.911 DYSFUNCTION OF RIGHT ROTATOR CUFF: Primary | ICD-10-CM

## 2020-07-27 DIAGNOSIS — M79.642 PAIN IN BOTH HANDS: Primary | ICD-10-CM

## 2020-07-27 DIAGNOSIS — M25.619 LIMITED RANGE OF MOTION (ROM) OF SHOULDER: ICD-10-CM

## 2020-07-27 DIAGNOSIS — M19.011 DJD OF RIGHT AC (ACROMIOCLAVICULAR) JOINT: ICD-10-CM

## 2020-07-27 PROCEDURE — 99203 PR OFFICE/OUTPT VISIT, NEW, LEVL III, 30-44 MIN: ICD-10-PCS | Mod: HCNC,S$GLB,, | Performed by: PHYSICIAN ASSISTANT

## 2020-07-27 PROCEDURE — 1159F PR MEDICATION LIST DOCUMENTED IN MEDICAL RECORD: ICD-10-PCS | Mod: HCNC,S$GLB,, | Performed by: PHYSICIAN ASSISTANT

## 2020-07-27 PROCEDURE — 99999 PR PBB SHADOW E&M-EST. PATIENT-LVL V: CPT | Mod: PBBFAC,HCNC,, | Performed by: PHYSICIAN ASSISTANT

## 2020-07-27 PROCEDURE — 3075F SYST BP GE 130 - 139MM HG: CPT | Mod: HCNC,CPTII,S$GLB, | Performed by: PHYSICIAN ASSISTANT

## 2020-07-27 PROCEDURE — 3078F DIAST BP <80 MM HG: CPT | Mod: HCNC,CPTII,S$GLB, | Performed by: PHYSICIAN ASSISTANT

## 2020-07-27 PROCEDURE — 99203 OFFICE O/P NEW LOW 30 MIN: CPT | Mod: HCNC,S$GLB,, | Performed by: PHYSICIAN ASSISTANT

## 2020-07-27 PROCEDURE — 3288F FALL RISK ASSESSMENT DOCD: CPT | Mod: HCNC,CPTII,S$GLB, | Performed by: PHYSICIAN ASSISTANT

## 2020-07-27 PROCEDURE — 99999 PR PBB SHADOW E&M-EST. PATIENT-LVL V: ICD-10-PCS | Mod: PBBFAC,HCNC,, | Performed by: PHYSICIAN ASSISTANT

## 2020-07-27 PROCEDURE — 1100F PTFALLS ASSESS-DOCD GE2>/YR: CPT | Mod: HCNC,CPTII,S$GLB, | Performed by: PHYSICIAN ASSISTANT

## 2020-07-27 PROCEDURE — 73030 XR SHOULDER COMPLETE 2 OR MORE VIEWS RIGHT: ICD-10-PCS | Mod: 26,HCNC,RT, | Performed by: RADIOLOGY

## 2020-07-27 PROCEDURE — 73030 X-RAY EXAM OF SHOULDER: CPT | Mod: 26,HCNC,RT, | Performed by: RADIOLOGY

## 2020-07-27 PROCEDURE — 1125F AMNT PAIN NOTED PAIN PRSNT: CPT | Mod: HCNC,S$GLB,, | Performed by: PHYSICIAN ASSISTANT

## 2020-07-27 PROCEDURE — 1125F PR PAIN SEVERITY QUANTIFIED, PAIN PRESENT: ICD-10-PCS | Mod: HCNC,S$GLB,, | Performed by: PHYSICIAN ASSISTANT

## 2020-07-27 PROCEDURE — 1159F MED LIST DOCD IN RCRD: CPT | Mod: HCNC,S$GLB,, | Performed by: PHYSICIAN ASSISTANT

## 2020-07-27 PROCEDURE — 1100F PR PT FALLS ASSESS DOC 2+ FALLS/FALL W/INJURY/YR: ICD-10-PCS | Mod: HCNC,CPTII,S$GLB, | Performed by: PHYSICIAN ASSISTANT

## 2020-07-27 PROCEDURE — 73030 X-RAY EXAM OF SHOULDER: CPT | Mod: TC,HCNC,RT

## 2020-07-27 PROCEDURE — 3075F PR MOST RECENT SYSTOLIC BLOOD PRESS GE 130-139MM HG: ICD-10-PCS | Mod: HCNC,CPTII,S$GLB, | Performed by: PHYSICIAN ASSISTANT

## 2020-07-27 PROCEDURE — 3078F PR MOST RECENT DIASTOLIC BLOOD PRESSURE < 80 MM HG: ICD-10-PCS | Mod: HCNC,CPTII,S$GLB, | Performed by: PHYSICIAN ASSISTANT

## 2020-07-27 PROCEDURE — 3288F PR FALLS RISK ASSESSMENT DOCUMENTED: ICD-10-PCS | Mod: HCNC,CPTII,S$GLB, | Performed by: PHYSICIAN ASSISTANT

## 2020-07-27 NOTE — PROGRESS NOTES
Subjective:      Patient ID: Shireen Felix is a 74 y.o. female.    Chief Complaint: Pain of the Right Shoulder      HPI: Shireen Felix  is a 74 y.o. female who c/o Pain of the Right Shoulder   for duration of 9 months.  She had a bad fall back in October.  This put her in the hospital.  She was later discharged to an inpatient rehab facility and then moved to an extended stay facility in [place].  She has worked with physical therapy throughout that stent.  She also did some home health physical therapy as well as outpatient physical therapy as recently as February in March before COVID-19 hit.  She was planning on getting further workup with an MRI but was unable to get it scheduled due to COVID-19.  She was sent to me from interventional pain management to evaluate right shoulder pain as well as extensive ecchymoses of the right arm.    Pain level is 8/10.  She has been unable to lift the right shoulder unassisted above shoulder level since her fall back in October.  Quality is aching, throbbing, sharp.  The bruising she has is extensive.  It is migrating distally down the arm due to gravity.  This occurred in the last few weeks.  She had an ultrasound last week which was negative for DVT.  Alleviating factors include nothing.  Aggravating factors include attempted range of motion above shoulder level, as well as sleeping at nighttime.    Past Medical History:   Diagnosis Date    Abnormal ECG 8/5/2019    Aneurysm     Anticoagulant long-term use     Arthritis     CAD in native artery 8/5/2019    COPD (chronic obstructive pulmonary disease)     Diabetes mellitus     Glaucoma     Hypertension     Seizures     Shingles 05/27/2017    Stroke      Past Surgical History:   Procedure Laterality Date    BRAIN SURGERY      CARDIAC CATHETERIZATION      CORONARY ANGIOPLASTY      EPIDURAL STEROID INJECTION INTO LUMBAR SPINE Bilateral 11/12/2019    Procedure: TF XANDER L4/5;  Surgeon: Desean VILLATORO  MD Larissa;  Location: [facility]IN [facility];  Service: Pain Management;  Laterality: Bilateral;    HYSTERECTOMY      INJECTION OF ANESTHETIC AGENT AROUND MEDIAL BRANCH NERVES INNERVATING LUMBAR FACET JOINT Bilateral 1/31/2020    Procedure: Bilateral L3-5 MBB;  Surgeon: Desean Dias MD;  Location: [facility]IN [facility]GT;  Service: Pain Management;  Laterality: Bilateral;    INJECTION OF JOINT Bilateral 7/9/2020    Procedure: Bilateral shoulder GH Joint injection with local;  Surgeon: Desean Dias MD;  Location: [facility]IN [facility];  Service: Pain Management;  Laterality: Bilateral;    TRANSFORAMINAL EPIDURAL INJECTION OF STEROID Bilateral 7/23/2019    Procedure: Bilateral L3/4 Transforaminal Epidural Steroid Injection;  Surgeon: Desean Dias MD;  Location: [facility];  Service: Pain Management;  Laterality: Bilateral;    TRANSFORAMINAL EPIDURAL INJECTION OF STEROID Bilateral 3/10/2020    Procedure: Right T12/L1 TF XANDER with local;  Surgeon: Desean Dias MD;  Location: [facility];  Service: Pain Management;  Laterality: Bilateral;    TRANSFORAMINAL EPIDURAL INJECTION OF STEROID Right 6/19/2020    Procedure: Right T12/L1 TFESI Covid day of procedure;  Surgeon: Desean Dias MD;  Location: [facility]IN [facility];  Service: Pain Management;  Laterality: Right;     Family History   Problem Relation Age of Onset    No Known Problems Mother     No Known Problems Father      Social History     Socioeconomic History    Marital status: Single     Spouse name: Not on file    Number of children: Not on file    Years of education: Not on file    Highest education level: Not on file   Occupational History    Not on file   Social Needs    Financial resource strain: Not on file    Food insecurity     Worry: Not on file     Inability: Not on file    Transportation needs     Medical: Not on file     Non-medical: Not on file   Tobacco Use    Smoking status: Former Smoker     Packs/day: 1.00     Years: 10.00     Pack years:  10.00     Types: Cigarettes     Quit date: 2004     Years since quittin.3    Smokeless tobacco: Never Used   Substance and Sexual Activity    Alcohol use: No    Drug use: Never    Sexual activity: Not Currently   Lifestyle    Physical activity     Days per week: Not on file     Minutes per session: Not on file    Stress: Not at all   Relationships    Social connections     Talks on phone: Not on file     Gets together: Not on file     Attends Baptism service: Not on file     Active member of club or organization: Not on file     Attends meetings of clubs or organizations: Not on file     Relationship status: Not on file   Other Topics Concern    Not on file   Social History Narrative    No pets or smokers in household.     Medication List with Changes/Refills   Current Medications    ALBUTEROL (PROVENTIL/VENTOLIN HFA) 90 MCG/ACTUATION INHALER    Inhale 2 puffs into the lungs every 4 (four) hours as needed.    ALBUTEROL-IPRATROPIUM (DUO-NEB) 2.5 MG-0.5 MG/3 ML NEBULIZER SOLUTION    Take 3 mLs by nebulization every 6 (six) hours as needed for Wheezing. Rescue    AMLODIPINE (NORVASC) 10 MG TABLET    Take 1 tablet (10 mg total) by mouth once daily.    APTIOM 600 MG TAB    TAKE 1 TABLET BY MOUTH EVERY EVENING    ATORVASTATIN (LIPITOR) 40 MG TABLET    Take 1 tablet (40 mg total) by mouth once daily.    CHOLECALCIFEROL, VITAMIN D3, 1,250 MCG (50,000 UNIT) CAPSULE    Take 1 capsule (50,000 Units total) by mouth every 7 days.    CLOPIDOGREL (PLAVIX) 75 MG TABLET    Take 1 tablet (75 mg total) by mouth once daily.    CYCLOBENZAPRINE (FLEXERIL) 5 MG TABLET    TAKE 1 TABLET BY MOUTH THREE TIMES DAILY AS NEEDED FOR MUSCLE SPASMS    DALIRESP 500 MCG TAB    Take 1 tablet (500 mcg total) by mouth once daily.    DENOSUMAB (PROLIA) 60 MG/ML SYRG    every 6 (six) months.    DEXLANSOPRAZOLE (DEXILANT) 60 MG CAPSULE    Take 1 capsule (60 mg total) by mouth once daily.    DILTIAZEM (CARDIZEM LA) 120 MG 24 HR TABLET     Take 1 tablet (120 mg total) by mouth once daily.    DULOXETINE (CYMBALTA) 30 MG CAPSULE    Take 1 capsule (30 mg total) by mouth once daily.    ESCITALOPRAM OXALATE (LEXAPRO) 20 MG TABLET    Take 1 tablet (20 mg total) by mouth once daily.    FLUTICASONE FUROATE-VILANTEROL (BREO ELLIPTA) 200-25 MCG/DOSE DSDV DISKUS INHALER    Inhale 1 puff into the lungs every evening.    FLUTICASONE PROPIONATE (FLONASE) 50 MCG/ACTUATION NASAL SPRAY    1 spray (50 mcg total) by Each Nostril route once daily.    GEMFIBROZIL (LOPID) 600 MG TABLET    Take 1 tablet (600 mg total) by mouth once daily.    GUAIFENESIN 100 MG/5 ML (ROBITUSSIN) 100 MG/5 ML SYRUP    Take 200 mg by mouth as needed for Cough.    LACTOBACILLUS ACIDOPHILUS (PROBIOTIC ACIDOPHILUS ORAL)    Take 1 capsule by mouth 2 (two) times daily.    LORATADINE (CLARITIN) 10 MG TABLET    Take 1 tablet by mouth once daily.    MELATONIN ORAL    Take by mouth nightly as needed.    MELOXICAM (MOBIC) 7.5 MG TABLET    Take 1 tablet (7.5 mg total) by mouth once daily.    METFORMIN (GLUCOPHAGE) 500 MG TABLET    Take 1 tablet (500 mg total) by mouth 2 (two) times daily.    METOPROLOL TARTRATE (LOPRESSOR) 25 MG TABLET    Take 1 tablet (25 mg total) by mouth 2 (two) times daily. TAKE 1 TABLET BY MOUTH TWICE DAILY WITH FOOD FOR HEART AND BLOOD PRESSURE    MONTELUKAST (SINGULAIR) 10 MG TABLET    Take 1 tablet (10 mg total) by mouth once daily.    NEOMYCIN-POLYMYXIN-HYDROCORTISONE (CORTISPORIN) 3.5-10,000-1 MG/ML-UNIT/ML-% OTIC SUSPENSION    Place 4 drops into the right ear 4 (four) times daily.    RANOLAZINE (RANEXA) 1,000 MG TB12    Take 1 tablet (1,000 mg total) by mouth 2 (two) times daily.    SENNOSIDES (SENNA) 8.6 MG CAP    Take by mouth as needed.    SPIRIVA WITH HANDIHALER 18 MCG INHALATION CAPSULE    Inhale 1 capsule into the lungs once daily.    TOLTERODINE (DETROL LA) 4 MG 24 HR CAPSULE    Take 1 capsule (4 mg total) by mouth once daily.    TRAMADOL (ULTRAM) 50 MG TABLET    Take 1  tablet (50 mg total) by mouth every 6 (six) hours as needed.    VALSARTAN (DIOVAN) 320 MG TABLET    Take 1 tablet (320 mg total) by mouth once daily.    VENLAFAXINE 150 MG TR24    Take 150 mg by mouth once daily.     Review of patient's allergies indicates:   Allergen Reactions    Doxycycline Nausea Only    Penicillins Anaphylaxis, Hives, Shortness Of Breath and Swelling    Oxycodone Other (See Comments)     Fatigue      Penicillin g benzathine Other (See Comments)    Adhesive Rash    Betadine [povidone-iodine] Rash     Pt confirms has completed CT w/ IV contrast-iodine without reaction or need for pre-medication.    Sulfa (sulfonamide antibiotics) Itching       Review of Systems   Constitution: Negative for fever.   Cardiovascular: Negative for chest pain.   Respiratory: Negative for cough and shortness of breath.    Skin: Positive for color change (Ecchymosis right arm). Negative for rash.   Musculoskeletal: Positive for joint pain. Negative for joint swelling and stiffness.   Gastrointestinal: Negative for heartburn.   Neurological: Negative for headaches and numbness.         Objective:        General    Nursing note and vitals reviewed.  Constitutional: She is oriented to person, place, and time. She appears well-developed and well-nourished.   HENT:   Head: Normocephalic and atraumatic.   Eyes: EOM are normal.   Cardiovascular: Normal rate and regular rhythm.    Pulmonary/Chest: Effort normal.   Abdominal: Soft.   Neurological: She is alert and oriented to person, place, and time.   Psychiatric: She has a normal mood and affect. Her behavior is normal.         Right Shoulder Exam     Inspection/Observation   Swelling: absent  Bruising: present (Extensive ecchymoses right arm just distal to the shoulder migrating towards the elbow)  Scars: absent  Deformity: absent  Scapular Dyskinesia: negative    Tenderness   The patient is tender to palpation of the greater tuberosity, acromioclavicular joint and  biceps tendon.    Range of Motion   Active abduction: abnormal   Passive abduction: abnormal   Extension: abnormal   Forward Flexion: abnormal   Forward Elevation: abnormal  Adduction: abnormal  External Rotation 0 degrees: abnormal   Internal rotation 0 degrees: abnormal     Tests & Signs   Cross arm: negative  Drop arm: positive  Cervantes test: positive  Impingement: positive  Rotator Cuff Painful Arc/Range: severe  Active Compression Test ([place]'s Sign): positive  Speed's Test: positive    Other   Sensation: normal    Left Shoulder Exam     Inspection/Observation   Swelling: absent  Bruising: absent  Scars: absent  Deformity: absent  Scapular Dyskinesia: negative    Range of Motion   Active abduction: normal   Passive abduction: normal   Extension: normal   Forward Flexion: normal   Forward Elevation: normal  Adduction: normal  External Rotation 0 degrees: normal   Internal rotation 0 degrees: normal     Other   Sensation: normal       Muscle Strength   Right Upper Extremity   Shoulder Abduction: 3/5   Shoulder Internal Rotation: 4/5   Shoulder External Rotation: 4/5   Left Upper Extremity  Shoulder Abduction: 5/5   Shoulder Internal Rotation: 5/5   Shoulder External Rotation: 5/5     Vascular Exam     Right Pulses      Radial:                    2+      Left Pulses      Radial:                    2+      Capillary Refill  Right Hand: normal capillary refill  Left Hand: normal capillary refill              Xray images and report were reviewed today.  I agree with the radiologist's interpretation.    X-ray Shoulder 2 or More Views Right  Narrative: EXAMINATION:  XR SHOULDER COMPLETE 2 OR MORE VIEWS RIGHT    CLINICAL HISTORY:  Pain in right shoulder    TECHNIQUE:  Two or three views of the right shoulder were performed.    COMPARISON:  None    FINDINGS:  Degenerative changes at the AC and glenohumeral joints.  No fracture or dislocation.  Visualized lung zones are  clear.  Impression: As above    Electronically  signed by: Satish Olea MD  Date:    07/27/2020  Time:    15:10        Assessment:       Encounter Diagnoses   Name Primary?    Chronic right shoulder pain     Limited range of motion (ROM) of shoulder     Dysfunction of right rotator cuff Yes    DJD of right AC (acromioclavicular) joint           Plan:       Shireen was seen today for pain.    Diagnoses and all orders for this visit:    Dysfunction of right rotator cuff  -     CT Arthrogram Shoulder Right; Future  -     XR Arthrogram Shoulder Right with CT to Follow; Future    Chronic right shoulder pain  -     Ambulatory referral/consult to Orthopedics  -     CT Arthrogram Shoulder Right; Future  -     XR Arthrogram Shoulder Right with CT to Follow; Future    Limited range of motion (ROM) of shoulder  -     Ambulatory referral/consult to Orthopedics  -     CT Arthrogram Shoulder Right; Future  -     XR Arthrogram Shoulder Right with CT to Follow; Future    DJD of right AC (acromioclavicular) joint  -     CT Arthrogram Shoulder Right; Future  -     XR Arthrogram Shoulder Right with CT to Follow; Future        Normanty Veronica Felix is a new pt who comes in today for the above problems.  I certainly a.m. concerned about a rotator cuff tear.  I would like to get an MRI.  However, she has intracranial stents put in back in [place], [place].  She has no idea if there MRI compatible.  I have reviewed her records and it appears that she has been getting CT myelograms through another department here at [facility].  To be safe I will order a CT arthrogram of the right shoulder to evaluate for a rotator cuff tear.  I will see her back in the clinic to go over those results.  Incidentally, she is also complaining of bilateral hand pain.  I would like to get x-rays of the bilateral hands on her follow-up visit and we can discuss the shoulder as well as both hands at that time.  If she has problems before then, she will notify the office.  She verbalizes understanding and  agrees.    Follow up for CT arthrogram right shoulder results - xray bilat hands at f/u.          The patient understands, chooses and consents to this plan and accepts all   the risks which include but are not limited to the risks mentioned above.     Disclaimer: This note was prepared using a voice recognition system and is likely to have sound alike errors within the text.

## 2020-07-27 NOTE — LETTER
July 27, 2020      Domenica Lott PA-C  [address] [place] [address] [address] [place] [id number]           [facility]  [address] [place] [address] [place] [address]  Phone: [phone]  Fax: [phone]          Patient: Shireen Felix   MR Number: [id number]   YOB: 1945   Date of Visit: 7/27/2020       Dear Domenica Lott:    Thank you for referring Shireen Felix to me for evaluation. Attached you will find relevant portions of my assessment and plan of care.    If you have questions, please do not hesitate to call me. I look forward to following Shirene Felix along with you.    Sincerely,    Yamilet Monsalve PA-C    Enclosure  CC:  No Recipients    If you would like to receive this communication electronically, please contact [email or web address] or [phone] to request more information on [employer] Link access.    For providers and/or their staff who would like to refer a patient to [facility], please contact us through our one-stop-shop provider referral line, [facility], at [phone].    If you feel you have received this communication in error or would no longer like to receive these types of communications, please e-mail [email or web address]

## 2020-08-04 ENCOUNTER — HOSPITAL ENCOUNTER (OUTPATIENT)
Dept: RADIOLOGY | Facility: HOSPITAL | Age: 75
Discharge: HOME | End: 2020-08-04
Attending: PHYSICIAN ASSISTANT
Payer: MEDICARE

## 2020-08-04 DIAGNOSIS — M67.911 DYSFUNCTION OF RIGHT ROTATOR CUFF: ICD-10-CM

## 2020-08-04 DIAGNOSIS — M25.619 LIMITED RANGE OF MOTION (ROM) OF SHOULDER: ICD-10-CM

## 2020-08-04 DIAGNOSIS — M19.011 DJD OF RIGHT AC (ACROMIOCLAVICULAR) JOINT: ICD-10-CM

## 2020-08-04 DIAGNOSIS — G89.29 CHRONIC RIGHT SHOULDER PAIN: ICD-10-CM

## 2020-08-04 DIAGNOSIS — M25.511 CHRONIC RIGHT SHOULDER PAIN: ICD-10-CM

## 2020-08-04 PROCEDURE — 73040 CONTRAST X-RAY OF SHOULDER: CPT | Mod: TC,HCNC,RT

## 2020-08-04 PROCEDURE — 73201 CT UPPER EXTREMITY W/DYE: CPT | Mod: TC,HCNC,RT

## 2020-08-04 PROCEDURE — 73040 XR ARTHROGRAM SHOULDER RIGHT WITH CT TO FOLLOW: ICD-10-PCS | Mod: 26,HCNC,RT, | Performed by: RADIOLOGY

## 2020-08-04 PROCEDURE — 73040 CONTRAST X-RAY OF SHOULDER: CPT | Mod: 26,HCNC,RT, | Performed by: RADIOLOGY

## 2020-08-04 PROCEDURE — 23350 INJECTION FOR SHOULDER X-RAY: CPT | Mod: HCNC,RT,, | Performed by: RADIOLOGY

## 2020-08-04 PROCEDURE — 73201 CT UPPER EXTREMITY W/DYE: CPT | Mod: 26,HCNC,RT, | Performed by: RADIOLOGY

## 2020-08-04 PROCEDURE — 23350 XR ARTHROGRAM SHOULDER RIGHT WITH CT TO FOLLOW: ICD-10-PCS | Mod: HCNC,RT,, | Performed by: RADIOLOGY

## 2020-08-04 PROCEDURE — 25500020 PHARM REV CODE 255: Mod: HCNC | Performed by: PHYSICIAN ASSISTANT

## 2020-08-04 PROCEDURE — 73201 CT ARTHROGRAM SHOULDER RIGHT: ICD-10-PCS | Mod: 26,HCNC,RT, | Performed by: RADIOLOGY

## 2020-08-04 RX ADMIN — IOHEXOL 10 ML: 350 INJECTION, SOLUTION INTRAVENOUS at 10:08

## 2020-08-05 ENCOUNTER — OFFICE VISIT (OUTPATIENT)
Dept: HEMATOLOGY/ONCOLOGY | Facility: CLINIC | Age: 75
End: 2020-08-05
Payer: MEDICARE

## 2020-08-05 ENCOUNTER — LAB VISIT (OUTPATIENT)
Dept: LAB | Facility: HOSPITAL | Age: 75
End: 2020-08-05
Attending: INTERNAL MEDICINE
Payer: MEDICARE

## 2020-08-05 ENCOUNTER — PATIENT OUTREACH (OUTPATIENT)
Dept: ADMINISTRATIVE | Facility: OTHER | Age: 75
End: 2020-08-05

## 2020-08-05 VITALS
HEART RATE: 99 BPM | HEIGHT: 63 IN | BODY MASS INDEX: 23.04 KG/M2 | TEMPERATURE: 99 F | WEIGHT: 130.06 LBS | OXYGEN SATURATION: 99 % | RESPIRATION RATE: 17 BRPM | SYSTOLIC BLOOD PRESSURE: 140 MMHG | DIASTOLIC BLOOD PRESSURE: 88 MMHG

## 2020-08-05 DIAGNOSIS — R79.9 ABNORMAL FINDING OF BLOOD CHEMISTRY, UNSPECIFIED: ICD-10-CM

## 2020-08-05 DIAGNOSIS — R23.3 SPONTANEOUS ECCHYMOSES: Primary | ICD-10-CM

## 2020-08-05 DIAGNOSIS — R23.3 SPONTANEOUS ECCHYMOSES: ICD-10-CM

## 2020-08-05 DIAGNOSIS — J44.9 CHRONIC OBSTRUCTIVE PULMONARY DISEASE, UNSPECIFIED COPD TYPE: ICD-10-CM

## 2020-08-05 DIAGNOSIS — E11.59 HYPERTENSION ASSOCIATED WITH DIABETES: ICD-10-CM

## 2020-08-05 DIAGNOSIS — R23.3 SPONTANEOUS ECCHYMOSIS: ICD-10-CM

## 2020-08-05 DIAGNOSIS — I15.2 HYPERTENSION ASSOCIATED WITH DIABETES: ICD-10-CM

## 2020-08-05 DIAGNOSIS — M25.511 ACUTE PAIN OF RIGHT SHOULDER: ICD-10-CM

## 2020-08-05 DIAGNOSIS — R09.89 SUSPECTED DEEP VEIN THROMBOSIS (DVT): ICD-10-CM

## 2020-08-05 LAB
ALBUMIN SERPL BCP-MCNC: 3.3 G/DL (ref 3.5–5.2)
ALP SERPL-CCNC: 58 U/L (ref 55–135)
ALT SERPL W/O P-5'-P-CCNC: 12 U/L (ref 10–44)
ANION GAP SERPL CALC-SCNC: 9 MMOL/L (ref 8–16)
APTT BLDCRRT: 27.9 SEC (ref 21–32)
AST SERPL-CCNC: 19 U/L (ref 10–40)
BASOPHILS # BLD AUTO: 0.03 K/UL (ref 0–0.2)
BASOPHILS NFR BLD: 0.4 % (ref 0–1.9)
BILIRUB SERPL-MCNC: 0.2 MG/DL (ref 0.1–1)
BUN SERPL-MCNC: 22 MG/DL (ref 8–23)
CALCIUM SERPL-MCNC: 10 MG/DL (ref 8.7–10.5)
CHLORIDE SERPL-SCNC: 108 MMOL/L (ref 95–110)
CO2 SERPL-SCNC: 23 MMOL/L (ref 23–29)
CREAT SERPL-MCNC: 1 MG/DL (ref 0.5–1.4)
DIFFERENTIAL METHOD: ABNORMAL
EOSINOPHIL # BLD AUTO: 0.4 K/UL (ref 0–0.5)
EOSINOPHIL NFR BLD: 5.4 % (ref 0–8)
ERYTHROCYTE [DISTWIDTH] IN BLOOD BY AUTOMATED COUNT: 18.9 % (ref 11.5–14.5)
EST. GFR  (AFRICAN AMERICAN): >60 ML/MIN/1.73 M^2
EST. GFR  (NON AFRICAN AMERICAN): 55 ML/MIN/1.73 M^2
FERRITIN SERPL-MCNC: 17 NG/ML (ref 20–300)
GLUCOSE SERPL-MCNC: 100 MG/DL (ref 70–110)
HCT VFR BLD AUTO: 27.2 % (ref 37–48.5)
HGB BLD-MCNC: 7.4 G/DL (ref 12–16)
IMM GRANULOCYTES # BLD AUTO: 0.03 K/UL (ref 0–0.04)
IMM GRANULOCYTES NFR BLD AUTO: 0.4 % (ref 0–0.5)
INR PPP: 1 (ref 0.8–1.2)
IRON SERPL-MCNC: 11 UG/DL (ref 30–160)
LYMPHOCYTES # BLD AUTO: 2 K/UL (ref 1–4.8)
LYMPHOCYTES NFR BLD: 24 % (ref 18–48)
MCH RBC QN AUTO: 19.7 PG (ref 27–31)
MCHC RBC AUTO-ENTMCNC: 27.2 G/DL (ref 32–36)
MCV RBC AUTO: 73 FL (ref 82–98)
MONOCYTES # BLD AUTO: 0.7 K/UL (ref 0.3–1)
MONOCYTES NFR BLD: 8.4 % (ref 4–15)
NEUTROPHILS # BLD AUTO: 5 K/UL (ref 1.8–7.7)
NEUTROPHILS NFR BLD: 61.4 % (ref 38–73)
NRBC BLD-RTO: 0 /100 WBC
PLATELET # BLD AUTO: 316 K/UL (ref 150–350)
PMV BLD AUTO: 9.5 FL (ref 9.2–12.9)
POTASSIUM SERPL-SCNC: 4.3 MMOL/L (ref 3.5–5.1)
PROT SERPL-MCNC: 7.8 G/DL (ref 6–8.4)
PROTHROMBIN TIME: 10.5 SEC (ref 9–12.5)
RBC # BLD AUTO: 3.75 M/UL (ref 4–5.4)
SATURATED IRON: 2 % (ref 20–50)
SODIUM SERPL-SCNC: 140 MMOL/L (ref 136–145)
TOTAL IRON BINDING CAPACITY: 494 UG/DL (ref 250–450)
TRANSFERRIN SERPL-MCNC: 334 MG/DL (ref 200–375)
WBC # BLD AUTO: 8.13 K/UL (ref 3.9–12.7)

## 2020-08-05 PROCEDURE — 85730 THROMBOPLASTIN TIME PARTIAL: CPT | Mod: HCNC

## 2020-08-05 PROCEDURE — 1125F PR PAIN SEVERITY QUANTIFIED, PAIN PRESENT: ICD-10-PCS | Mod: HCNC,S$GLB,, | Performed by: INTERNAL MEDICINE

## 2020-08-05 PROCEDURE — 99205 PR OFFICE/OUTPT VISIT, NEW, LEVL V, 60-74 MIN: ICD-10-PCS | Mod: HCNC,S$GLB,, | Performed by: INTERNAL MEDICINE

## 2020-08-05 PROCEDURE — 99999 PR PBB SHADOW E&M-EST. PATIENT-LVL V: ICD-10-PCS | Mod: PBBFAC,HCNC,, | Performed by: INTERNAL MEDICINE

## 2020-08-05 PROCEDURE — 99999 PR PBB SHADOW E&M-EST. PATIENT-LVL V: CPT | Mod: PBBFAC,HCNC,, | Performed by: INTERNAL MEDICINE

## 2020-08-05 PROCEDURE — 85025 COMPLETE CBC W/AUTO DIFF WBC: CPT | Mod: HCNC

## 2020-08-05 PROCEDURE — 1125F AMNT PAIN NOTED PAIN PRSNT: CPT | Mod: HCNC,S$GLB,, | Performed by: INTERNAL MEDICINE

## 2020-08-05 PROCEDURE — 82728 ASSAY OF FERRITIN: CPT | Mod: HCNC

## 2020-08-05 PROCEDURE — 80053 COMPREHEN METABOLIC PANEL: CPT | Mod: HCNC

## 2020-08-05 PROCEDURE — 1159F MED LIST DOCD IN RCRD: CPT | Mod: HCNC,S$GLB,, | Performed by: INTERNAL MEDICINE

## 2020-08-05 PROCEDURE — 3077F SYST BP >= 140 MM HG: CPT | Mod: HCNC,CPTII,S$GLB, | Performed by: INTERNAL MEDICINE

## 2020-08-05 PROCEDURE — 83540 ASSAY OF IRON: CPT | Mod: HCNC

## 2020-08-05 PROCEDURE — 3044F HG A1C LEVEL LT 7.0%: CPT | Mod: HCNC,CPTII,S$GLB, | Performed by: INTERNAL MEDICINE

## 2020-08-05 PROCEDURE — 1101F PT FALLS ASSESS-DOCD LE1/YR: CPT | Mod: HCNC,CPTII,S$GLB, | Performed by: INTERNAL MEDICINE

## 2020-08-05 PROCEDURE — 3077F PR MOST RECENT SYSTOLIC BLOOD PRESSURE >= 140 MM HG: ICD-10-PCS | Mod: HCNC,CPTII,S$GLB, | Performed by: INTERNAL MEDICINE

## 2020-08-05 PROCEDURE — 99205 OFFICE O/P NEW HI 60 MIN: CPT | Mod: HCNC,S$GLB,, | Performed by: INTERNAL MEDICINE

## 2020-08-05 PROCEDURE — 3044F PR MOST RECENT HEMOGLOBIN A1C LEVEL <7.0%: ICD-10-PCS | Mod: HCNC,CPTII,S$GLB, | Performed by: INTERNAL MEDICINE

## 2020-08-05 PROCEDURE — 1159F PR MEDICATION LIST DOCUMENTED IN MEDICAL RECORD: ICD-10-PCS | Mod: HCNC,S$GLB,, | Performed by: INTERNAL MEDICINE

## 2020-08-05 PROCEDURE — 85610 PROTHROMBIN TIME: CPT | Mod: HCNC

## 2020-08-05 PROCEDURE — 36415 COLL VENOUS BLD VENIPUNCTURE: CPT | Mod: HCNC

## 2020-08-05 PROCEDURE — 1101F PR PT FALLS ASSESS DOC 0-1 FALLS W/OUT INJ PAST YR: ICD-10-PCS | Mod: HCNC,CPTII,S$GLB, | Performed by: INTERNAL MEDICINE

## 2020-08-05 PROCEDURE — 3079F DIAST BP 80-89 MM HG: CPT | Mod: HCNC,CPTII,S$GLB, | Performed by: INTERNAL MEDICINE

## 2020-08-05 PROCEDURE — 3079F PR MOST RECENT DIASTOLIC BLOOD PRESSURE 80-89 MM HG: ICD-10-PCS | Mod: HCNC,CPTII,S$GLB, | Performed by: INTERNAL MEDICINE

## 2020-08-05 NOTE — ASSESSMENT & PLAN NOTE
Unknown etiology.  Last CBC was done in January of 2020.  Will obtain CBC, CMP and coagulation studies PTT, PT and INR.  Further recommendations made based on the results.

## 2020-08-05 NOTE — LETTER
August 5, 2020      Domenica Lott PA-C  [address] [place] [address]            [facility]  [address] [place]  [place] [address]  Phone: [phone]  Fax: [phone]          Patient: Shireen Felix   MR Number: [id number]   YOB: 1945   Date of Visit: 8/5/2020       Dear Domenica Lott:    Thank you for referring Shireen Felix to me for evaluation. Attached you will find relevant portions of my assessment and plan of care.    If you have questions, please do not hesitate to call me. I look forward to following Shireen Felix along with you.    Sincerely,    Parviz Paul MD    Enclosure  CC:  No Recipients    If you would like to receive this communication electronically, please contact [email or web address] or [phone] to request more information on [employer] Link access.    For providers and/or their staff who would like to refer a patient to [facility], please contact us through our one-stop-shop provider referral line, [facility], at [phone].    If you feel you have received this communication in error or would no longer like to receive these types of communications, please e-mail [email or web address]

## 2020-08-05 NOTE — PROGRESS NOTES
Subjective:   Date of Visit: 8/5/20   ?   ?    REFERRING PROVIDER: Domenica Lott PA-C  [id number] [place] [address] [place] [id number]   ?   CHIEF COMPLAINT:  Spontaneous ecchymosis???????   ?     HPI:  Sent 4-year-old female with history of brain aneurysm status post 2 brain surgeries in 2003 and 2004, history of provoked proximal lower extremity DVT 1999 treated with short-term anticoagulation, diabetes type 2, hypertension, frequent falls, arthritis, COPD was referred to us for evaluation of spontaneous ecchymosis.    Patient describes what appears to be spontaneous eruption of erythema in both upper and lower extremity that has been going on for over 15 years but seems to have progressively worsened.  She recently had a fall on her right shoulder resulting in hematoma.  Recent ultrasound was negative for DVT in right upper extremity.    She also complains of intermittent epistaxis that lasts for few seconds.  Denies unintentional weight loss, night sweats, nausea or vomiting,  abdominal pain, diarrhea or dysuria.  Denies hemoptysis, hematemesis, hematochezia, melena or hematuria.  She endorses fatigue and shortness of breath especially with minimal exertion.    Family history significant for thrombosis.    Review of Systems   Constitutional: Positive for fatigue. Negative for activity change, appetite change, chills, fever and unexpected weight change.   HENT: Negative for hearing loss, mouth sores, nosebleeds, sore throat, tinnitus, trouble swallowing and voice change.    Eyes: Negative for visual disturbance.   Respiratory: Positive for shortness of breath. Negative for cough and chest tightness.    Cardiovascular: Negative for chest pain, palpitations and leg swelling.   Gastrointestinal: Negative for abdominal pain, anal bleeding, blood in stool, constipation, diarrhea, nausea and vomiting.   Genitourinary: Negative for dysuria, frequency, hematuria, pelvic pain, vaginal bleeding and vaginal pain.    Musculoskeletal: Positive for arthralgias, back pain and gait problem. Negative for joint swelling and neck pain.   Skin: Negative for color change, pallor, rash and wound.   Allergic/Immunologic: Negative for immunocompromised state.   Neurological: Negative for dizziness, tremors, syncope, speech difficulty, weakness, light-headedness and headaches.   Hematological: Negative for adenopathy. Bruises/bleeds easily.   Psychiatric/Behavioral: Negative for agitation, confusion, decreased concentration, hallucinations and sleep disturbance. The patient is not nervous/anxious.        ?   PAST MEDICAL HISTORY:   Past Medical History:   Diagnosis Date    Abnormal ECG 8/5/2019    Aneurysm     Anticoagulant long-term use     Arthritis     CAD in native artery 8/5/2019    COPD (chronic obstructive pulmonary disease)     Diabetes mellitus     Glaucoma     Hypertension     Seizures     Shingles 05/27/2017    Stroke     ?     PAST SURGICAL HISTORY:   Past Surgical History:   Procedure Laterality Date    BRAIN SURGERY      CARDIAC CATHETERIZATION      CORONARY ANGIOPLASTY      EPIDURAL STEROID INJECTION INTO LUMBAR SPINE Bilateral 11/12/2019    Procedure: TF XANDER L4/5;  Surgeon: Desean Dias MD;  Location: [facility];  Service: Pain Management;  Laterality: Bilateral;    HYSTERECTOMY      INJECTION OF ANESTHETIC AGENT AROUND MEDIAL BRANCH NERVES INNERVATING LUMBAR FACET JOINT Bilateral 1/31/2020    Procedure: Bilateral L3-5 MBB;  Surgeon: Desean Dias MD;  Location: [facility];  Service: Pain Management;  Laterality: Bilateral;    INJECTION OF JOINT Bilateral 7/9/2020    Procedure: Bilateral shoulder GH Joint injection with local;  Surgeon: Desean Dias MD;  Location: [facility];  Service: Pain Management;  Laterality: Bilateral;    TRANSFORAMINAL EPIDURAL INJECTION OF STEROID Bilateral 7/23/2019    Procedure: Bilateral L3/4 Transforaminal Epidural Steroid Injection;  Surgeon: Desean VILLATORO  MD Larissa;  Location: [facility];  Service: Pain Management;  Laterality: Bilateral;    TRANSFORAMINAL EPIDURAL INJECTION OF STEROID Bilateral 3/10/2020    Procedure: Right T12/L1 TF XANDER with local;  Surgeon: Desean Dias MD;  Location: [facility];  Service: Pain Management;  Laterality: Bilateral;    TRANSFORAMINAL EPIDURAL INJECTION OF STEROID Right 6/19/2020    Procedure: Right T12/L1 TFESI Covid day of procedure;  Surgeon: Desean Dias MD;  Location: [facility];  Service: Pain Management;  Laterality: Right;      ?   ALLERGIES:   Allergies as of 08/05/2020 - Reviewed 08/05/2020   Allergen Reaction Noted    Doxycycline Nausea Only 04/26/2017    Penicillins Anaphylaxis, Hives, Shortness Of Breath, and Swelling 06/29/2012    Oxycodone Other (See Comments) 04/11/2019    Penicillin g benzathine Other (See Comments) 02/11/2020    Adhesive Rash 08/26/2014    Betadine [povidone-iodine] Rash 02/14/2017    Sulfa (sulfonamide antibiotics) Itching 04/11/2019      ?   MEDICATIONS:?   Outpatient Medications Marked as Taking for the 8/5/20 encounter (Office Visit) with Parviz Paul MD   Medication Sig Dispense Refill    albuterol (PROVENTIL/VENTOLIN HFA) 90 mcg/actuation inhaler Inhale 2 puffs into the lungs every 4 (four) hours as needed. 18 g 11    albuterol-ipratropium (DUO-NEB) 2.5 mg-0.5 mg/3 mL nebulizer solution Take 3 mLs by nebulization every 6 (six) hours as needed for Wheezing. Rescue 1 Box 1    amLODIPine (NORVASC) 10 MG tablet Take 1 tablet (10 mg total) by mouth once daily. 30 tablet 3    APTIOM 600 mg Tab TAKE 1 TABLET BY MOUTH EVERY EVENING 30 tablet 11    atorvastatin (LIPITOR) 40 MG tablet Take 1 tablet (40 mg total) by mouth once daily. 90 tablet 3    cholecalciferol, vitamin D3, 1,250 mcg (50,000 unit) capsule Take 1 capsule (50,000 Units total) by mouth every 7 days. 12 capsule 0    clopidogreL (PLAVIX) 75 mg tablet Take 1 tablet (75 mg total) by mouth once daily. 30  tablet 6    cyclobenzaprine (FLEXERIL) 5 MG tablet TAKE 1 TABLET BY MOUTH THREE TIMES DAILY AS NEEDED FOR MUSCLE SPASMS 30 tablet 1    DALIRESP 500 mcg Tab Take 1 tablet (500 mcg total) by mouth once daily. 30 tablet 0    denosumab (PROLIA) 60 mg/mL Syrg every 6 (six) months.      dexlansoprazole (DEXILANT) 60 mg capsule Take 1 capsule (60 mg total) by mouth once daily. 90 capsule 3    diltiaZEM (CARDIZEM LA) 120 mg 24 hr tablet Take 1 tablet (120 mg total) by mouth once daily. 30 tablet 3    escitalopram oxalate (LEXAPRO) 20 MG tablet Take 1 tablet (20 mg total) by mouth once daily. 30 tablet 0    fluticasone furoate-vilanterol (BREO ELLIPTA) 200-25 mcg/dose DsDv diskus inhaler Inhale 1 puff into the lungs every evening. 60 each 11    fluticasone propionate (FLONASE) 50 mcg/actuation nasal spray 1 spray (50 mcg total) by Each Nostril route once daily. 16 g 11    gemfibrozil (LOPID) 600 MG tablet Take 1 tablet (600 mg total) by mouth once daily. 30 tablet 0    guaifenesin 100 mg/5 ml (ROBITUSSIN) 100 mg/5 mL syrup Take 200 mg by mouth as needed for Cough.      Lactobacillus acidophilus (PROBIOTIC ACIDOPHILUS ORAL) Take 1 capsule by mouth 2 (two) times daily.      loratadine (CLARITIN) 10 mg tablet Take 1 tablet by mouth once daily.      MELATONIN ORAL Take by mouth nightly as needed.      meloxicam (MOBIC) 7.5 MG tablet Take 1 tablet (7.5 mg total) by mouth once daily. 14 tablet 0    metFORMIN (GLUCOPHAGE) 500 MG tablet Take 1 tablet (500 mg total) by mouth 2 (two) times daily. 60 tablet 0    metoprolol tartrate (LOPRESSOR) 25 MG tablet Take 1 tablet (25 mg total) by mouth 2 (two) times daily. TAKE 1 TABLET BY MOUTH TWICE DAILY WITH FOOD FOR HEART AND BLOOD PRESSURE 180 tablet 3    montelukast (SINGULAIR) 10 mg tablet Take 1 tablet (10 mg total) by mouth once daily. 90 tablet 3    neomycin-polymyxin-hydrocortisone (CORTISPORIN) 3.5-10,000-1 mg/mL-unit/mL-% otic suspension Place 4 drops into the  right ear 4 (four) times daily. 10 mL 0    ranolazine (RANEXA) 1,000 mg Tb12 Take 1 tablet (1,000 mg total) by mouth 2 (two) times daily. 60 tablet 6    sennosides (SENNA) 8.6 mg Cap Take by mouth as needed.      SPIRIVA WITH HANDIHALER 18 mcg inhalation capsule Inhale 1 capsule into the lungs once daily.      tolterodine (DETROL LA) 4 MG 24 hr capsule Take 1 capsule (4 mg total) by mouth once daily. 90 capsule 3    traMADoL (ULTRAM) 50 mg tablet Take 1 tablet (50 mg total) by mouth every 6 (six) hours as needed. 12 tablet 0    valsartan (DIOVAN) 320 MG tablet Take 1 tablet (320 mg total) by mouth once daily. 30 tablet 3    venlafaxine 150 mg TR24 Take 150 mg by mouth once daily.        ?   SOCIAL HISTORY:?   Social History     Tobacco Use    Smoking status: Former Smoker     Packs/day: 1.00     Years: 10.00     Pack years: 10.00     Types: Cigarettes     Quit date: 2004     Years since quittin.3    Smokeless tobacco: Never Used   Substance Use Topics    Alcohol use: No        ?   FAMILY HISTORY:   family history includes No Known Problems in her father and mother.   ?     Objective:      Physical Exam  Constitutional:       General: She is not in acute distress.     Appearance: She is well-developed. She is cachectic. She is ill-appearing. She is not toxic-appearing.   HENT:      Head: Normocephalic and atraumatic.      Mouth/Throat:      Pharynx: No oropharyngeal exudate.   Eyes:      General: No scleral icterus.        Right eye: No discharge.         Left eye: No discharge.      Conjunctiva/sclera: Conjunctivae normal.      Pupils: Pupils are equal, round, and reactive to light.   Neck:      Musculoskeletal: Normal range of motion and neck supple.      Thyroid: No thyromegaly.   Cardiovascular:      Rate and Rhythm: Normal rate and regular rhythm.      Heart sounds: No murmur.   Pulmonary:      Effort: Pulmonary effort is normal. No respiratory distress.      Breath sounds: Normal breath  sounds.   Chest:      Chest wall: No tenderness.   Abdominal:      General: Bowel sounds are normal. There is no distension.      Palpations: Abdomen is soft. There is no mass.      Tenderness: There is no abdominal tenderness. There is no guarding or rebound.   Musculoskeletal: Normal range of motion.         General: Swelling (Right shoulder joint, likely hematoma from recent fall) present. No tenderness.   Lymphadenopathy:      Cervical: No cervical adenopathy.      Right cervical: No superficial cervical adenopathy.     Left cervical: No superficial cervical adenopathy.      Upper Body:      Right upper body: No supraclavicular or pectoral adenopathy.      Left upper body: No supraclavicular or pectoral adenopathy.   Skin:     General: Skin is warm and dry.      Capillary Refill: Capillary refill takes 2 to 3 seconds.      Coloration: Skin is not pale.      Findings: Erythema (Diffuse ecchymosis mostly to bilateral upper extremity.) present. No rash.   Neurological:      Mental Status: She is alert and oriented to person, place, and time.      Cranial Nerves: No cranial nerve deficit.      Sensory: No sensory deficit.   Psychiatric:         Behavior: Behavior normal. Behavior is cooperative.         Judgment: Judgment normal.         ?   Vitals:    08/05/20 1521   BP: (!) 140/88   Pulse: 99   Resp: 17   Temp: 98.6 °F (37 °C)      ?     ECOG SCORE    2 - Capable of all selfcare but unable to carry out any work activities, active > 50% of hours         ?   Laboratory:  ?   No visits with results within 1 Day(s) from this visit.   Latest known visit with results is:   Admission on 07/09/2020, Discharged on 07/09/2020   Component Date Value Ref Range Status    POCT Glucose 07/09/2020 97  70 - 110 mg/dL Final      ?   Tumor markers   ?   ?   Imaging: XR Arthrogram Shoulder Right with CT to Follow  Narrative: EXAMINATION:  XR ARTHROGRAM SHOULDER RIGHT WITH CT TO FOLLOW    CLINICAL HISTORY:  Pain in right  shoulder    TECHNIQUE:  Right shoulder arthrogram    COMPARISON:  None    FINDINGS:  The risks and benefits of the procedure were explained to the patient and informed consent was obtained.  The placement was placed in the supine position on the fluoroscopy table with the shoulder externally rotated.  A site in the region of the rotator interval was then marked and prepped and draped in the usual sterile fashion.  A 22 gauge 3-1/2 inch spinal needle was then advanced into the joint space without difficulty.  Approximately 15 cc of Omnipaque 350 was injected into the joint without difficulty.  Contrast seen diffusely throughout the joint and external to the joint suggesting a large rotator cuff tear.  A geyser lesion is also present at the AC joint.  Please see CT to follow.  Impression: As above    Electronically signed by: David Wallace DO  Date:    08/05/2020  Time:    08:37     ?      Pathology:  Pathology Results  (Last 10 years)    None           ?   Assessment/Plan:       1. Spontaneous ecchymoses    2. Acute pain of right shoulder    3. Suspected deep vein thrombosis (DVT)    4. Spontaneous ecchymosis    5. Chronic obstructive pulmonary disease, unspecified COPD type    6. Hypertension associated with diabetes          ?Spontaneous ecchymosis  Unknown etiology.  Last CBC was done in January of 2020.  Will obtain CBC, CMP and coagulation studies PTT, PT and INR.  Further recommendations made based on the results.    COPD (chronic obstructive pulmonary disease)  On singular and Spiriva.  Follows with pulmonology service.    Hypertension associated with diabetes  Management per PCP.     ?   ?   Follow-Up: Follow up in about 1 week (around 8/12/2020).    CECY PARRISH Md., Ph.D  Hematology & Oncology Department  Phone #: [phone]

## 2020-08-05 NOTE — PROGRESS NOTES
Requested updates within Care Everywhere.  Patient's chart was reviewed for overdue JANE topics.  Immunizations reconciled.

## 2020-08-06 ENCOUNTER — HOSPITAL ENCOUNTER (OUTPATIENT)
Dept: RADIOLOGY | Facility: HOSPITAL | Age: 75
Discharge: HOME | End: 2020-08-06
Attending: PHYSICIAN ASSISTANT
Payer: MEDICARE

## 2020-08-06 ENCOUNTER — OFFICE VISIT (OUTPATIENT)
Dept: ORTHOPEDICS | Facility: CLINIC | Age: 75
End: 2020-08-06
Payer: MEDICARE

## 2020-08-06 VITALS
RESPIRATION RATE: 18 BRPM | SYSTOLIC BLOOD PRESSURE: 134 MMHG | HEIGHT: 63 IN | DIASTOLIC BLOOD PRESSURE: 73 MMHG | BODY MASS INDEX: 23.04 KG/M2 | WEIGHT: 130.06 LBS

## 2020-08-06 DIAGNOSIS — M18.11 ARTHRITIS OF CARPOMETACARPAL (CMC) JOINT OF RIGHT THUMB: ICD-10-CM

## 2020-08-06 DIAGNOSIS — M75.121 COMPLETE TEAR OF RIGHT ROTATOR CUFF, UNSPECIFIED WHETHER TRAUMATIC: ICD-10-CM

## 2020-08-06 DIAGNOSIS — M19.011 DJD OF RIGHT AC (ACROMIOCLAVICULAR) JOINT: ICD-10-CM

## 2020-08-06 DIAGNOSIS — M79.641 PAIN IN BOTH HANDS: ICD-10-CM

## 2020-08-06 DIAGNOSIS — G89.29 CHRONIC RIGHT SHOULDER PAIN: Primary | ICD-10-CM

## 2020-08-06 DIAGNOSIS — M79.642 PAIN IN BOTH HANDS: ICD-10-CM

## 2020-08-06 DIAGNOSIS — M18.12 ARTHRITIS OF CARPOMETACARPAL (CMC) JOINT OF LEFT THUMB: ICD-10-CM

## 2020-08-06 DIAGNOSIS — M25.511 CHRONIC RIGHT SHOULDER PAIN: Primary | ICD-10-CM

## 2020-08-06 PROCEDURE — 1125F AMNT PAIN NOTED PAIN PRSNT: CPT | Mod: HCNC,S$GLB,, | Performed by: PHYSICIAN ASSISTANT

## 2020-08-06 PROCEDURE — 73130 X-RAY EXAM OF HAND: CPT | Mod: 26,50,HCNC, | Performed by: RADIOLOGY

## 2020-08-06 PROCEDURE — 1100F PR PT FALLS ASSESS DOC 2+ FALLS/FALL W/INJURY/YR: ICD-10-PCS | Mod: HCNC,CPTII,S$GLB, | Performed by: PHYSICIAN ASSISTANT

## 2020-08-06 PROCEDURE — 73130 XR HAND COMPLETE 3 VIEWS BILATERAL: ICD-10-PCS | Mod: 26,50,HCNC, | Performed by: RADIOLOGY

## 2020-08-06 PROCEDURE — 3288F FALL RISK ASSESSMENT DOCD: CPT | Mod: HCNC,CPTII,S$GLB, | Performed by: PHYSICIAN ASSISTANT

## 2020-08-06 PROCEDURE — 1100F PTFALLS ASSESS-DOCD GE2>/YR: CPT | Mod: HCNC,CPTII,S$GLB, | Performed by: PHYSICIAN ASSISTANT

## 2020-08-06 PROCEDURE — 99999 PR PBB SHADOW E&M-EST. PATIENT-LVL V: CPT | Mod: PBBFAC,HCNC,, | Performed by: PHYSICIAN ASSISTANT

## 2020-08-06 PROCEDURE — 99214 PR OFFICE/OUTPT VISIT, EST, LEVL IV, 30-39 MIN: ICD-10-PCS | Mod: 25,HCNC,S$GLB, | Performed by: PHYSICIAN ASSISTANT

## 2020-08-06 PROCEDURE — 1159F PR MEDICATION LIST DOCUMENTED IN MEDICAL RECORD: ICD-10-PCS | Mod: HCNC,S$GLB,, | Performed by: PHYSICIAN ASSISTANT

## 2020-08-06 PROCEDURE — 99999 PR PBB SHADOW E&M-EST. PATIENT-LVL V: ICD-10-PCS | Mod: PBBFAC,HCNC,, | Performed by: PHYSICIAN ASSISTANT

## 2020-08-06 PROCEDURE — 3075F PR MOST RECENT SYSTOLIC BLOOD PRESS GE 130-139MM HG: ICD-10-PCS | Mod: HCNC,CPTII,S$GLB, | Performed by: PHYSICIAN ASSISTANT

## 2020-08-06 PROCEDURE — 1125F PR PAIN SEVERITY QUANTIFIED, PAIN PRESENT: ICD-10-PCS | Mod: HCNC,S$GLB,, | Performed by: PHYSICIAN ASSISTANT

## 2020-08-06 PROCEDURE — 99214 OFFICE O/P EST MOD 30 MIN: CPT | Mod: 25,HCNC,S$GLB, | Performed by: PHYSICIAN ASSISTANT

## 2020-08-06 PROCEDURE — 3075F SYST BP GE 130 - 139MM HG: CPT | Mod: HCNC,CPTII,S$GLB, | Performed by: PHYSICIAN ASSISTANT

## 2020-08-06 PROCEDURE — 3078F DIAST BP <80 MM HG: CPT | Mod: HCNC,CPTII,S$GLB, | Performed by: PHYSICIAN ASSISTANT

## 2020-08-06 PROCEDURE — 1159F MED LIST DOCD IN RCRD: CPT | Mod: HCNC,S$GLB,, | Performed by: PHYSICIAN ASSISTANT

## 2020-08-06 PROCEDURE — 73130 X-RAY EXAM OF HAND: CPT | Mod: TC,50,HCNC

## 2020-08-06 PROCEDURE — 3288F PR FALLS RISK ASSESSMENT DOCUMENTED: ICD-10-PCS | Mod: HCNC,CPTII,S$GLB, | Performed by: PHYSICIAN ASSISTANT

## 2020-08-06 PROCEDURE — 3078F PR MOST RECENT DIASTOLIC BLOOD PRESSURE < 80 MM HG: ICD-10-PCS | Mod: HCNC,CPTII,S$GLB, | Performed by: PHYSICIAN ASSISTANT

## 2020-08-06 PROCEDURE — 20600: ICD-10-PCS | Mod: HCNC,RT,S$GLB, | Performed by: PHYSICIAN ASSISTANT

## 2020-08-06 PROCEDURE — 20600 DRAIN/INJ JOINT/BURSA W/O US: CPT | Mod: HCNC,RT,S$GLB, | Performed by: PHYSICIAN ASSISTANT

## 2020-08-06 RX ORDER — SODIUM CHLORIDE 0.9 % (FLUSH) 0.9 %
10 SYRINGE (ML) INJECTION
Status: CANCELLED | OUTPATIENT
Start: 2020-08-20

## 2020-08-06 RX ORDER — METHYLPREDNISOLONE ACETATE 80 MG/ML
40 INJECTION, SUSPENSION INTRA-ARTICULAR; INTRALESIONAL; INTRAMUSCULAR; SOFT TISSUE
Status: DISCONTINUED | OUTPATIENT
Start: 2020-08-06 | End: 2020-08-06 | Stop reason: HOSPADM

## 2020-08-06 RX ORDER — HEPARIN 100 UNIT/ML
500 SYRINGE INTRAVENOUS
Status: CANCELLED | OUTPATIENT
Start: 2020-08-20

## 2020-08-06 RX ORDER — HEPARIN 100 UNIT/ML
500 SYRINGE INTRAVENOUS
Status: CANCELLED | OUTPATIENT
Start: 2020-08-13

## 2020-08-06 RX ORDER — SODIUM CHLORIDE 0.9 % (FLUSH) 0.9 %
10 SYRINGE (ML) INJECTION
Status: CANCELLED | OUTPATIENT
Start: 2020-08-13

## 2020-08-06 RX ADMIN — METHYLPREDNISOLONE ACETATE 40 MG: 80 INJECTION, SUSPENSION INTRA-ARTICULAR; INTRALESIONAL; INTRAMUSCULAR; SOFT TISSUE at 11:08

## 2020-08-06 NOTE — PROGRESS NOTES
Patient ID: Shireen Felix is a 74 y.o. female.    Chief Complaint: Pain of the Right Shoulder, Pain of the Left Hand, Pain of the Right Hand, and Results (discuss CT/ X-ray )      HPI: Shireen Felix  is a 74 y.o. female who c/o Pain of the Right Shoulder, Pain of the Left Hand, Pain of the Right Hand, and Results (discuss CT/ X-ray )   for duration of 9 months.  She had a bad fall back in October.  This put her in the hospital.  She was later discharged to an inpatient rehab facility and then moved to an extended stay facility in [place].  She has worked with physical therapy throughout that stent.  She also did some home health physical therapy as well as outpatient physical therapy as recently as February in March before COVID-19 hit.  She was planning on getting further workup with an MRI but was unable to get it scheduled due to COVID-19.  She was sent to me from interventional pain management to evaluate right shoulder pain as well as extensive ecchymoses of the right arm.     Pain level is 8/10.  She has been unable to lift the right shoulder unassisted above shoulder level since her fall back in October.  Quality is aching, throbbing, sharp.  The bruising she has is extensive.  It is migrating distally down the arm due to gravity.  This occurred in the last few weeks.  She had an ultrasound last week which was negative for DVT.  Alleviating factors include nothing.  Aggravating factors include attempted range of motion above shoulder level, as well as sleeping at nighttime.  I evaluated her recently and ordered a CT arthrogram of the right shoulder to rule out a rotator cuff tear.  She comes to discuss those results.    Additionally, she has complaints of pain at the base of the thumbs bilaterally.  Right hand is much worse than left.  She complains of associated weakness.  Worsened with gripping and twisting.  Also worsened with repetitive use.  Severity is moderate.  She denies  associated numbness and tingling.  Quality is aching.  Improved at rest.    Past Medical History:   Diagnosis Date    Abnormal ECG 8/5/2019    Aneurysm     Anticoagulant long-term use     Arthritis     CAD in native artery 8/5/2019    COPD (chronic obstructive pulmonary disease)     Diabetes mellitus     Glaucoma     Hypertension     Seizures     Shingles 05/27/2017    Stroke      Past Surgical History:   Procedure Laterality Date    BRAIN SURGERY      CARDIAC CATHETERIZATION      CORONARY ANGIOPLASTY      EPIDURAL STEROID INJECTION INTO LUMBAR SPINE Bilateral 11/12/2019    Procedure: TF XANDER L4/5;  Surgeon: Desean Dias MD;  Location: [facility];  Service: Pain Management;  Laterality: Bilateral;    HYSTERECTOMY      INJECTION OF ANESTHETIC AGENT AROUND MEDIAL BRANCH NERVES INNERVATING LUMBAR FACET JOINT Bilateral 1/31/2020    Procedure: Bilateral L3-5 MBB;  Surgeon: Desean Dias MD;  Location: [facility];  Service: Pain Management;  Laterality: Bilateral;    INJECTION OF JOINT Bilateral 7/9/2020    Procedure: Bilateral shoulder GH Joint injection with local;  Surgeon: Desean Dias MD;  Location: [facility];  Service: Pain Management;  Laterality: Bilateral;    TRANSFORAMINAL EPIDURAL INJECTION OF STEROID Bilateral 7/23/2019    Procedure: Bilateral L3/4 Transforaminal Epidural Steroid Injection;  Surgeon: Desean Dias MD;  Location: [facility];  Service: Pain Management;  Laterality: Bilateral;    TRANSFORAMINAL EPIDURAL INJECTION OF STEROID Bilateral 3/10/2020    Procedure: Right T12/L1 TF XANDER with local;  Surgeon: Desean Dias MD;  Location: [facility];  Service: Pain Management;  Laterality: Bilateral;    TRANSFORAMINAL EPIDURAL INJECTION OF STEROID Right 6/19/2020    Procedure: Right T12/L1 TFESI Covid day of procedure;  Surgeon: Desean Dias MD;  Location: [facility];  Service: Pain Management;  Laterality: Right;     Family History   Problem  Relation Age of Onset    No Known Problems Mother     No Known Problems Father      Social History     Socioeconomic History    Marital status: Single     Spouse name: Not on file    Number of children: Not on file    Years of education: Not on file    Highest education level: Not on file   Occupational History    Not on file   Social Needs    Financial resource strain: Not on file    Food insecurity     Worry: Not on file     Inability: Not on file    Transportation needs     Medical: Not on file     Non-medical: Not on file   Tobacco Use    Smoking status: Former Smoker     Packs/day: 1.00     Years: 10.00     Pack years: 10.00     Types: Cigarettes     Quit date: 2004     Years since quittin.3    Smokeless tobacco: Never Used   Substance and Sexual Activity    Alcohol use: No    Drug use: Never    Sexual activity: Not Currently   Lifestyle    Physical activity     Days per week: Not on file     Minutes per session: Not on file    Stress: Not at all   Relationships    Social connections     Talks on phone: Not on file     Gets together: Not on file     Attends Jehovah's witness service: Not on file     Active member of club or organization: Not on file     Attends meetings of clubs or organizations: Not on file     Relationship status: Not on file   Other Topics Concern    Not on file   Social History Narrative    No pets or smokers in household.     Medication List with Changes/Refills   Current Medications    ALBUTEROL (PROVENTIL/VENTOLIN HFA) 90 MCG/ACTUATION INHALER    Inhale 2 puffs into the lungs every 4 (four) hours as needed.    ALBUTEROL-IPRATROPIUM (DUO-NEB) 2.5 MG-0.5 MG/3 ML NEBULIZER SOLUTION    Take 3 mLs by nebulization every 6 (six) hours as needed for Wheezing. Rescue    AMLODIPINE (NORVASC) 10 MG TABLET    Take 1 tablet (10 mg total) by mouth once daily.    APTIOM 600 MG TAB    TAKE 1 TABLET BY MOUTH EVERY EVENING    ATORVASTATIN (LIPITOR) 40 MG TABLET    Take 1 tablet (40 mg  total) by mouth once daily.    CHOLECALCIFEROL, VITAMIN D3, 1,250 MCG (50,000 UNIT) CAPSULE    Take 1 capsule (50,000 Units total) by mouth every 7 days.    CLOPIDOGREL (PLAVIX) 75 MG TABLET    Take 1 tablet (75 mg total) by mouth once daily.    CYCLOBENZAPRINE (FLEXERIL) 5 MG TABLET    TAKE 1 TABLET BY MOUTH THREE TIMES DAILY AS NEEDED FOR MUSCLE SPASMS    DALIRESP 500 MCG TAB    Take 1 tablet (500 mcg total) by mouth once daily.    DENOSUMAB (PROLIA) 60 MG/ML SYRG    every 6 (six) months.    DEXLANSOPRAZOLE (DEXILANT) 60 MG CAPSULE    Take 1 capsule (60 mg total) by mouth once daily.    DILTIAZEM (CARDIZEM LA) 120 MG 24 HR TABLET    Take 1 tablet (120 mg total) by mouth once daily.    DULOXETINE (CYMBALTA) 30 MG CAPSULE    Take 1 capsule (30 mg total) by mouth once daily.    ESCITALOPRAM OXALATE (LEXAPRO) 20 MG TABLET    Take 1 tablet (20 mg total) by mouth once daily.    FLUTICASONE FUROATE-VILANTEROL (BREO ELLIPTA) 200-25 MCG/DOSE DSDV DISKUS INHALER    Inhale 1 puff into the lungs every evening.    FLUTICASONE PROPIONATE (FLONASE) 50 MCG/ACTUATION NASAL SPRAY    1 spray (50 mcg total) by Each Nostril route once daily.    GEMFIBROZIL (LOPID) 600 MG TABLET    Take 1 tablet (600 mg total) by mouth once daily.    GUAIFENESIN 100 MG/5 ML (ROBITUSSIN) 100 MG/5 ML SYRUP    Take 200 mg by mouth as needed for Cough.    LACTOBACILLUS ACIDOPHILUS (PROBIOTIC ACIDOPHILUS ORAL)    Take 1 capsule by mouth 2 (two) times daily.    LORATADINE (CLARITIN) 10 MG TABLET    Take 1 tablet by mouth once daily.    MELATONIN ORAL    Take by mouth nightly as needed.    MELOXICAM (MOBIC) 7.5 MG TABLET    Take 1 tablet (7.5 mg total) by mouth once daily.    METFORMIN (GLUCOPHAGE) 500 MG TABLET    Take 1 tablet (500 mg total) by mouth 2 (two) times daily.    METOPROLOL TARTRATE (LOPRESSOR) 25 MG TABLET    Take 1 tablet (25 mg total) by mouth 2 (two) times daily. TAKE 1 TABLET BY MOUTH TWICE DAILY WITH FOOD FOR HEART AND BLOOD PRESSURE     MONTELUKAST (SINGULAIR) 10 MG TABLET    Take 1 tablet (10 mg total) by mouth once daily.    NEOMYCIN-POLYMYXIN-HYDROCORTISONE (CORTISPORIN) 3.5-10,000-1 MG/ML-UNIT/ML-% OTIC SUSPENSION    Place 4 drops into the right ear 4 (four) times daily.    RANOLAZINE (RANEXA) 1,000 MG TB12    Take 1 tablet (1,000 mg total) by mouth 2 (two) times daily.    SENNOSIDES (SENNA) 8.6 MG CAP    Take by mouth as needed.    SPIRIVA WITH HANDIHALER 18 MCG INHALATION CAPSULE    Inhale 1 capsule into the lungs once daily.    TOLTERODINE (DETROL LA) 4 MG 24 HR CAPSULE    Take 1 capsule (4 mg total) by mouth once daily.    TRAMADOL (ULTRAM) 50 MG TABLET    Take 1 tablet (50 mg total) by mouth every 6 (six) hours as needed.    VALSARTAN (DIOVAN) 320 MG TABLET    Take 1 tablet (320 mg total) by mouth once daily.    VENLAFAXINE 150 MG TR24    Take 150 mg by mouth once daily.     Review of patient's allergies indicates:   Allergen Reactions    Doxycycline Nausea Only    Penicillins Anaphylaxis, Hives, Shortness Of Breath and Swelling    Oxycodone Other (See Comments)     Fatigue      Penicillin g benzathine Other (See Comments)    Adhesive Rash    Betadine [povidone-iodine] Rash     Pt confirms has completed CT w/ IV contrast-iodine without reaction or need for pre-medication.    Sulfa (sulfonamide antibiotics) Itching       Objective:        General    Nursing note and vitals reviewed.  Constitutional: She is oriented to person, place, and time. She appears well-developed and well-nourished.   HENT:   Head: Normocephalic and atraumatic.   Eyes: EOM are normal.   Cardiovascular: Normal rate and regular rhythm.    Pulmonary/Chest: Effort normal.   Abdominal: Soft.   Neurological: She is alert and oriented to person, place, and time.   Psychiatric: She has a normal mood and affect. Her behavior is normal.             Right Hand/Wrist Exam     Inspection   Scars: Wrist - absent Hand -  absent  Effusion: Wrist - absent Hand -   absent  Bruising: Wrist - absent Hand -  absent  Deformity: Wrist - deformity Hand -  deformity    Range of Motion     Wrist   Extension: normal   Flexion: normal   Pronation: normal   Supination: normal     Tests   Phalens Sign: negative  Tinel's sign (median nerve): negative  Finkelstein's test: negative    Atrophy   Thenar:  negative  Hypothenar:  negative  Intrinsic:  negative  1st Dorsal Interosseous: negative    Other     Neuorologic Exam    Median Distribution: normal  Ulnar Distribution: normal  Radial Distribution: normal    Comments:  2+ radial pulse  Full flex/ext all MCP, PIP and DIP joints  TTP 1st CMC jt  Pain with rotatory compression 1st CMC  Crepitus 1st CMC jt      Left Hand/Wrist Exam     Inspection   Scars: Wrist - absent Hand -  absent  Effusion: Wrist - absent Hand -  absent  Bruising: Wrist - absent Hand -  absent  Deformity: Wrist - absent Hand -  present    Range of Motion     Wrist   Extension: normal   Flexion: normal   Pronation: normal   Supination: normal     Tests   Phalens Sign: negative  Tinel's sign (median nerve): negative  Finkelstein's test: negative    Atrophy  Thenar:  Negative  Hypothenar:  negative  Intrinsic: negative  1st Dorsal Interosseous:  negative    Other     Sensory Exam  Median Distribution: normal  Ulnar Distribution: normal  Radial Distribution: normal    Comments:  2+ radial pulse  Full flex/ext all MCP, PIP and DIP joints  TTP 1st CMC jt  Pain with rotatory compression 1st CMC  Crepitus 1st CMC jt      Right Elbow Exam     Tests   Tinel's sign (cubital tunnel): negative      Left Elbow Exam     Tests   Tinel's sign (cubital tunnel): negative    Right Shoulder Exam     Inspection/Observation   Swelling: absent  Bruising: present (Extensive ecchymoses right arm just distal to the shoulder migrating towards the elbow)  Scars: absent  Deformity: absent  Scapular Dyskinesia: negative    Tenderness   The patient is tender to palpation of the greater tuberosity,  acromioclavicular joint and biceps tendon.    Range of Motion   Active abduction:  90 abnormal   Passive abduction:  110 abnormal   Extension: abnormal   Forward Flexion:  100 abnormal   Forward Elevation: abnormal  Adduction: abnormal  External Rotation 0 degrees: abnormal   Internal rotation 0 degrees:  Sacrum abnormal     Tests & Signs   Cross arm: negative  Drop arm: positive  Cervantes test: positive  Impingement: positive  Rotator Cuff Painful Arc/Range: severe  Active Compression Test ([place]'s Sign): positive  Speed's Test: positive    Other   Sensation: normal    Left Shoulder Exam     Inspection/Observation   Swelling: absent  Bruising: absent  Scars: absent  Deformity: absent  Scapular Dyskinesia: negative    Range of Motion   Active abduction: normal   Passive abduction: normal   Extension: normal   Forward Flexion: normal   Forward Elevation: normal  Adduction: normal  External Rotation 0 degrees: normal   Internal rotation 0 degrees: normal     Other   Sensation: normal       Muscle Strength   Right Upper Extremity   Shoulder Abduction: 3/5   Shoulder Internal Rotation: 4/5   Shoulder External Rotation: 4/5   Wrist extension: 5/5/5   Wrist flexion: 5/5/5   : 4/5/5   Pinch Mechanism: 4/5  Left Upper Extremity  Shoulder Abduction: 5/5   Shoulder Internal Rotation: 5/5   Shoulder External Rotation: 5/5   Wrist extension: 5/5/5   Wrist flexion: 5/5/5   :  4/5/5   Pinch Mechanism: 4/5    Vascular Exam     Right Pulses      Radial:                    2+      Left Pulses      Radial:                    2+      Capillary Refill  Right Hand: normal capillary refill  Left Hand: normal capillary refill      Xray Images and report were reviewed today.  I agree with the radiologist's interpretation.    X-Ray Hand 3 View Bilateral  Narrative: EXAMINATION:  XR HAND COMPLETE 3 VIEWS BILATERAL    CLINICAL HISTORY:  . Pain in right hand    TECHNIQUE:  PA, lateral, and oblique views of both hands were  performed.    COMPARISON:  07/31/2019. 01/14/2020.    FINDINGS:  Right hand: Scattered multi articular degenerative changes most severe at the 1st carpometacarpal joint.  Bones are demineralized.  No fracture or dislocation or osseous erosion.  No soft tissue swelling.  Overall, degenerative findings have slightly progressed since the comparison exam.    Left hand: Scattered multi articular degenerative changes most pronounced at the 1st carpometacarpal joint.  No erosions.  Soft tissues appear normal.  Overall, degenerative findings appears slightly progressed.  Impression: As above    Electronically signed by: Satish Olea MD  Date:    08/06/2020  Time:    11:08    CT Arthrogram Shoulder Right  Order: [id number]  Status:  Final result   Visible to patient:  Yes (Patient Portal)   Next appt:  08/13/2020 at 10:30 AM in Lab (LABORATORY, [facility])   Dx:  Chronic right shoulder pain; Dysfunct...  Details    Reading Physician Reading Date Result Priority   David Wallace DO  [phone] 8/4/2020 Routine      Narrative & Impression     EXAMINATION:  CT ARTHROGRAM SHOULDER RIGHT     CLINICAL HISTORY:  Shoulder pain, rotator cuff disorder suspected, nondiagnostic xray;  Pain in right shoulder     TECHNIQUE:  CT of the right shoulder was obtained status post administration of intra-articular Omnipaque 350.  Coronal and sagittal reformats were provided.     COMPARISON:  Plain films from 07/27/2020     FINDINGS:  There is a massive full-thickness tear of the rotator cuff that involves the entire supraspinatus and infraspinatus and measures at least 3.5-4 cm in the sagittal plane with fibers retracted to at least the level of the glenoid and fatty atrophy of the supraspinatus and infraspinatus musculature.  Contrast seen diffusely throughout the subacromial/subdeltoid bursa.  Intra-articular portion of the long head of the biceps tendon not seen and may be torn and retracted.  No acute fracture or dislocation.  Moderate AC  joint arthropathy noted.  A small geyser lesion is noted at the AC joint.     The visualized lung parenchyma appears to demonstrate some subpleural bleb formation versus minimal subpleural fibrosis within the superior segment of the right lower lobe.     Impression:     1. Massive chronic full-thickness tear of the rotator cuff as described above.  Nonvisualization of the intra-articular portion of the biceps tendon suggesting chronic tear and retraction.  2. Remaining findings as discussed above.                     Assessment:       Encounter Diagnoses   Name Primary?    Chronic right shoulder pain Yes    Complete tear of right rotator cuff, unspecified whether traumatic     DJD of right AC (acromioclavicular) joint     Arthritis of carpometacarpal (CMC) joint of right thumb     Arthritis of carpometacarpal (CMC) joint of left thumb           Plan:       Shireen was seen today for results, pain, pain and pain.    Diagnoses and all orders for this visit:    Chronic right shoulder pain  -     Ambulatory referral/consult to Physical/Occupational Therapy; Future    Complete tear of right rotator cuff, unspecified whether traumatic  -     Ambulatory referral/consult to Physical/Occupational Therapy; Future    DJD of right AC (acromioclavicular) joint  -     Ambulatory referral/consult to Physical/Occupational Therapy; Future    Arthritis of carpometacarpal (CMC) joint of right thumb  -     Small Joint Aspiration/Injection- Right thumb cmc arthritis    Arthritis of carpometacarpal (CMC) joint of left thumb        Shireen Felix is an established pt who comes in today for follow-up CT arthrogram results.  She has a massive rotator cuff tear.  We talked about referral to a shoulder specialist.  When it comes down to it, she would like to avoid surgery at this time.  She recently underwent glenohumeral joint injection less than 2 months ago.  I would hold off on reinjecting for about another month.  Instead, we  will get her started with formal physical therapy.  I want them to work on gentle range of motion and gentle strengthening.  I will be happy to see her back in the office in 4 weeks at which time I would be happy to repeat a steroid injection in the shoulder.  She also has bilateral CMC arthritis.  It is severe bilaterally.  The right hand gives her more pain than left.  I would like to put her into a thumb spica splint bilaterally.  We have discussed risks and benefits of a corticosteroid injection for the right hand.  She wishes to proceed with that today.  I will see her back in the office in about a month as discussed in if she has worsening symptoms before then she will notify the office.  She verbalizes understanding and agrees.    Follow up in about 1 month (around 9/6/2020) for no xray.    The patient understands, chooses and consents to this plan and accepts all   the risks which include but are not limited to the risks mentioned above.     Disclaimer: This note was prepared using a voice recognition system and is likely to have sound alike errors within the text.

## 2020-08-06 NOTE — PROCEDURES
Small Joint Aspiration/Injection- Right thumb cmc arthritis    Date/Time: 8/6/2020 11:20 AM  Performed by: Yamilet Monsalve PA-C  Authorized by: Yamilet Monsalve PA-C     Consent Done?:  Yes (Verbal)  Indications:  Arthritis and pain  Site marked: the procedure site was marked    Timeout: prior to procedure the correct patient, procedure, and site was verified    Prep: patient was prepped and draped in usual sterile fashion      Local anesthesia used?: Yes    Local anesthetic:  Lidocaine 1% without epinephrine  Anesthetic total (ml):  0.5    Location:  Thumb  Thumb joint: Right cmc arthritis.  Needle size:  25 G  Medications:  40 mg methylPREDNISolone acetate 80 mg/mL  Patient tolerance:  Patient tolerated the procedure well with no immediate complications    Additional Comments: Right 1st CMC joint Injection Report:  After verbal consent was obtained for right 1st cmc injection, patient ID, site, and side were verified.  The  Right thumb was sterilly prepped in the standard fashion.  A 25-gauge needle was introduced into right 1st cmc joint without complication and was then injected with 10 mg lidocaine plain and 40 mg depomedrol.  A sterile bandaid was applied.  The patient was informed to apply an ice pack approximately 10min once arriving home and not to do anything strenuous for 24hours.  He was instructed to call if there were any problems. The patient was discharged in stable condition.

## 2020-08-10 DIAGNOSIS — R23.3 SPONTANEOUS ECCHYMOSIS: Primary | ICD-10-CM

## 2020-08-10 DIAGNOSIS — R79.9 ABNORMAL FINDING OF BLOOD CHEMISTRY, UNSPECIFIED: ICD-10-CM

## 2020-08-13 ENCOUNTER — INFUSION (OUTPATIENT)
Dept: INFUSION THERAPY | Facility: HOSPITAL | Age: 75
End: 2020-08-13
Attending: INTERNAL MEDICINE
Payer: MEDICARE

## 2020-08-13 ENCOUNTER — OFFICE VISIT (OUTPATIENT)
Dept: RHEUMATOLOGY | Facility: CLINIC | Age: 75
End: 2020-08-13
Payer: MEDICARE

## 2020-08-13 ENCOUNTER — LAB VISIT (OUTPATIENT)
Dept: LAB | Facility: HOSPITAL | Age: 75
End: 2020-08-13
Attending: INTERNAL MEDICINE
Payer: MEDICARE

## 2020-08-13 VITALS
DIASTOLIC BLOOD PRESSURE: 72 MMHG | WEIGHT: 129 LBS | SYSTOLIC BLOOD PRESSURE: 124 MMHG | HEIGHT: 63 IN | HEART RATE: 88 BPM | BODY MASS INDEX: 22.86 KG/M2

## 2020-08-13 VITALS
RESPIRATION RATE: 16 BRPM | TEMPERATURE: 98 F | HEART RATE: 84 BPM | DIASTOLIC BLOOD PRESSURE: 74 MMHG | OXYGEN SATURATION: 98 % | SYSTOLIC BLOOD PRESSURE: 126 MMHG

## 2020-08-13 DIAGNOSIS — R23.3 SPONTANEOUS ECCHYMOSES: ICD-10-CM

## 2020-08-13 DIAGNOSIS — M81.0 AGE-RELATED OSTEOPOROSIS WITHOUT CURRENT PATHOLOGICAL FRACTURE: Primary | ICD-10-CM

## 2020-08-13 DIAGNOSIS — E55.9 VITAMIN D INSUFFICIENCY: ICD-10-CM

## 2020-08-13 DIAGNOSIS — M81.0 AGE-RELATED OSTEOPOROSIS WITHOUT CURRENT PATHOLOGICAL FRACTURE: ICD-10-CM

## 2020-08-13 DIAGNOSIS — D50.8 OTHER IRON DEFICIENCY ANEMIA: ICD-10-CM

## 2020-08-13 LAB
25(OH)D3+25(OH)D2 SERPL-MCNC: 63 NG/ML (ref 30–96)
ALBUMIN SERPL BCP-MCNC: 3.2 G/DL (ref 3.5–5.2)
ALP SERPL-CCNC: 69 U/L (ref 55–135)
ALT SERPL W/O P-5'-P-CCNC: 11 U/L (ref 10–44)
ANION GAP SERPL CALC-SCNC: 10 MMOL/L (ref 8–16)
AST SERPL-CCNC: 16 U/L (ref 10–40)
BASOPHILS # BLD AUTO: 0.05 K/UL (ref 0–0.2)
BASOPHILS NFR BLD: 0.7 % (ref 0–1.9)
BILIRUB SERPL-MCNC: 0.2 MG/DL (ref 0.1–1)
BUN SERPL-MCNC: 16 MG/DL (ref 8–23)
CALCIUM SERPL-MCNC: 9.9 MG/DL (ref 8.7–10.5)
CHLORIDE SERPL-SCNC: 108 MMOL/L (ref 95–110)
CO2 SERPL-SCNC: 21 MMOL/L (ref 23–29)
CREAT SERPL-MCNC: 0.8 MG/DL (ref 0.5–1.4)
DIFFERENTIAL METHOD: ABNORMAL
EOSINOPHIL # BLD AUTO: 0.5 K/UL (ref 0–0.5)
EOSINOPHIL NFR BLD: 6.4 % (ref 0–8)
ERYTHROCYTE [DISTWIDTH] IN BLOOD BY AUTOMATED COUNT: 19 % (ref 11.5–14.5)
EST. GFR  (AFRICAN AMERICAN): >60 ML/MIN/1.73 M^2
EST. GFR  (NON AFRICAN AMERICAN): >60 ML/MIN/1.73 M^2
GLUCOSE SERPL-MCNC: 97 MG/DL (ref 70–110)
HCT VFR BLD AUTO: 28.6 % (ref 37–48.5)
HGB BLD-MCNC: 7.8 G/DL (ref 12–16)
IMM GRANULOCYTES # BLD AUTO: 0.02 K/UL (ref 0–0.04)
IMM GRANULOCYTES NFR BLD AUTO: 0.3 % (ref 0–0.5)
LYMPHOCYTES # BLD AUTO: 1.7 K/UL (ref 1–4.8)
LYMPHOCYTES NFR BLD: 22.1 % (ref 18–48)
MCH RBC QN AUTO: 19.6 PG (ref 27–31)
MCHC RBC AUTO-ENTMCNC: 27.3 G/DL (ref 32–36)
MCV RBC AUTO: 72 FL (ref 82–98)
MONOCYTES # BLD AUTO: 0.8 K/UL (ref 0.3–1)
MONOCYTES NFR BLD: 11 % (ref 4–15)
NEUTROPHILS # BLD AUTO: 4.5 K/UL (ref 1.8–7.7)
NEUTROPHILS NFR BLD: 59.8 % (ref 38–73)
NRBC BLD-RTO: 0 /100 WBC
PLATELET # BLD AUTO: 329 K/UL (ref 150–350)
PMV BLD AUTO: 9.4 FL (ref 9.2–12.9)
POTASSIUM SERPL-SCNC: 3.9 MMOL/L (ref 3.5–5.1)
PROT SERPL-MCNC: 8 G/DL (ref 6–8.4)
RBC # BLD AUTO: 3.97 M/UL (ref 4–5.4)
SODIUM SERPL-SCNC: 139 MMOL/L (ref 136–145)
WBC # BLD AUTO: 7.46 K/UL (ref 3.9–12.7)

## 2020-08-13 PROCEDURE — 85025 COMPLETE CBC W/AUTO DIFF WBC: CPT | Mod: HCNC

## 2020-08-13 PROCEDURE — 63600175 PHARM REV CODE 636 W HCPCS: Mod: JG,HCNC | Performed by: INTERNAL MEDICINE

## 2020-08-13 PROCEDURE — 3074F PR MOST RECENT SYSTOLIC BLOOD PRESSURE < 130 MM HG: ICD-10-PCS | Mod: HCNC,CPTII,S$GLB, | Performed by: INTERNAL MEDICINE

## 2020-08-13 PROCEDURE — 3078F DIAST BP <80 MM HG: CPT | Mod: HCNC,CPTII,S$GLB, | Performed by: INTERNAL MEDICINE

## 2020-08-13 PROCEDURE — 1125F PR PAIN SEVERITY QUANTIFIED, PAIN PRESENT: ICD-10-PCS | Mod: HCNC,S$GLB,, | Performed by: INTERNAL MEDICINE

## 2020-08-13 PROCEDURE — 1101F PT FALLS ASSESS-DOCD LE1/YR: CPT | Mod: HCNC,CPTII,S$GLB, | Performed by: INTERNAL MEDICINE

## 2020-08-13 PROCEDURE — 99214 PR OFFICE/OUTPT VISIT, EST, LEVL IV, 30-39 MIN: ICD-10-PCS | Mod: HCNC,S$GLB,, | Performed by: INTERNAL MEDICINE

## 2020-08-13 PROCEDURE — 96372 THER/PROPH/DIAG INJ SC/IM: CPT | Mod: HCNC,59

## 2020-08-13 PROCEDURE — 36415 COLL VENOUS BLD VENIPUNCTURE: CPT | Mod: HCNC

## 2020-08-13 PROCEDURE — 80053 COMPREHEN METABOLIC PANEL: CPT | Mod: HCNC

## 2020-08-13 PROCEDURE — 99999 PR PBB SHADOW E&M-EST. PATIENT-LVL IV: ICD-10-PCS | Mod: PBBFAC,HCNC,, | Performed by: INTERNAL MEDICINE

## 2020-08-13 PROCEDURE — 1159F PR MEDICATION LIST DOCUMENTED IN MEDICAL RECORD: ICD-10-PCS | Mod: HCNC,S$GLB,, | Performed by: INTERNAL MEDICINE

## 2020-08-13 PROCEDURE — 1159F MED LIST DOCD IN RCRD: CPT | Mod: HCNC,S$GLB,, | Performed by: INTERNAL MEDICINE

## 2020-08-13 PROCEDURE — 1125F AMNT PAIN NOTED PAIN PRSNT: CPT | Mod: HCNC,S$GLB,, | Performed by: INTERNAL MEDICINE

## 2020-08-13 PROCEDURE — 82306 VITAMIN D 25 HYDROXY: CPT | Mod: HCNC

## 2020-08-13 PROCEDURE — 96375 TX/PRO/DX INJ NEW DRUG ADDON: CPT | Mod: HCNC

## 2020-08-13 PROCEDURE — 99999 PR PBB SHADOW E&M-EST. PATIENT-LVL IV: CPT | Mod: PBBFAC,HCNC,, | Performed by: INTERNAL MEDICINE

## 2020-08-13 PROCEDURE — 63600175 PHARM REV CODE 636 W HCPCS: Mod: HCNC | Performed by: INTERNAL MEDICINE

## 2020-08-13 PROCEDURE — 25000003 PHARM REV CODE 250: Mod: HCNC | Performed by: INTERNAL MEDICINE

## 2020-08-13 PROCEDURE — 96365 THER/PROPH/DIAG IV INF INIT: CPT | Mod: HCNC

## 2020-08-13 PROCEDURE — 3074F SYST BP LT 130 MM HG: CPT | Mod: HCNC,CPTII,S$GLB, | Performed by: INTERNAL MEDICINE

## 2020-08-13 PROCEDURE — 1101F PR PT FALLS ASSESS DOC 0-1 FALLS W/OUT INJ PAST YR: ICD-10-PCS | Mod: HCNC,CPTII,S$GLB, | Performed by: INTERNAL MEDICINE

## 2020-08-13 PROCEDURE — 99214 OFFICE O/P EST MOD 30 MIN: CPT | Mod: HCNC,S$GLB,, | Performed by: INTERNAL MEDICINE

## 2020-08-13 PROCEDURE — 3078F PR MOST RECENT DIASTOLIC BLOOD PRESSURE < 80 MM HG: ICD-10-PCS | Mod: HCNC,CPTII,S$GLB, | Performed by: INTERNAL MEDICINE

## 2020-08-13 RX ORDER — SODIUM CHLORIDE 0.9 % (FLUSH) 0.9 %
10 SYRINGE (ML) INJECTION
Status: DISCONTINUED | OUTPATIENT
Start: 2020-08-13 | End: 2020-08-13 | Stop reason: HOSPADM

## 2020-08-13 RX ORDER — METHYLPREDNISOLONE SOD SUCC 125 MG
125 VIAL (EA) INJECTION
Status: COMPLETED | OUTPATIENT
Start: 2020-08-13 | End: 2020-08-13

## 2020-08-13 RX ORDER — METHYLPREDNISOLONE SOD SUCC 125 MG
VIAL (EA) INJECTION
Status: DISPENSED
Start: 2020-08-13 | End: 2020-08-14

## 2020-08-13 RX ADMIN — DENOSUMAB 60 MG: 60 INJECTION SUBCUTANEOUS at 02:08

## 2020-08-13 RX ADMIN — METHYLPREDNISOLONE SODIUM SUCCINATE 125 MG: 125 INJECTION, POWDER, FOR SOLUTION INTRAMUSCULAR; INTRAVENOUS at 01:08

## 2020-08-13 RX ADMIN — FERRIC CARBOXYMALTOSE INJECTION 750 MG: 50 INJECTION, SOLUTION INTRAVENOUS at 01:08

## 2020-08-13 RX ADMIN — SODIUM CHLORIDE: 9 INJECTION, SOLUTION INTRAVENOUS at 01:08

## 2020-08-13 NOTE — PROGRESS NOTES
CC:  Chief Complaint   Patient presents with    Osteoporosis       History of Present Illness:  Shireen Quinn a 74 y.o.yo female presents for f/u of osteoporosis   she was diagnosed with osteoporosis in the past and was given 1 dose of Prolia  This was done few years back then was lost to follow-up  She had a recent DEXA done in Jan 2020  which was suggestive of osteoporosis with a T-score of -3 at right radius  Started prolia in Feb 2020   Dose # 2 today   She also takes vitamin-D 1000 units a day, Last levels in June 2019- 25  Advised to increase to 2000 units/ day     DEXA :  As above  Current meds for osteopenia/ osteoporosis  :  No  Prior meds for osteopenia/ osteoporosis  : prolia   Hystrectomy: yes  Smoker : yes  Kidney problems :  Yes, CKD stage 2-3   Prior fracture : yes   Steroids :  No      Bleeding gums :  No  Dental caries :  No  Anticipated dental work :  No  Always notify us and dentist prior to any dental procedures while on meds including 1.Bisphosphonates- fosamax, actonel,boniva , reclast    2.Prolia   Jaw pains :  No  GERD :  No      She has chronic pain issues, chronic low back pain and sees Pain Management for XANDER  Pain aggravated by any movement  Pain radiates down the right hip, no incontinence or loss of sensations  She takes tramadol and Flexeril      Review of Systems:  Constitutional: Denies fever, chills. No recent weight changes.   Fatigue: no  Muscle weakness: no  Headaches: no new headaches  Eyes: No redness .  No recent visual changes.  ENT:  No oral or nasal ulcers.  Card: No chest pain.  Resp: No cough or sob.   Gastro: No nausea or vomiting.  No heartburn.  Constipation: no  Diarrhea: no  Genito:  No dysuria.  No genital ulcers.  Skin: No rash.  Raynauds:no  Neuro: No numbness / tingling.   Psych: No depression, anxiety  Endo:  no excess thirst.  Heme: no abnormal bleeding or bruising  Clots:none       Past Medical History:   Diagnosis Date    Abnormal ECG 8/5/2019     Aneurysm     Anticoagulant long-term use     Arthritis     CAD in native artery 8/5/2019    COPD (chronic obstructive pulmonary disease)     Diabetes mellitus     Glaucoma     Hypertension     Seizures     Shingles 05/27/2017    Stroke        Past Surgical History:   Procedure Laterality Date    BRAIN SURGERY      CARDIAC CATHETERIZATION      CORONARY ANGIOPLASTY      EPIDURAL STEROID INJECTION INTO LUMBAR SPINE Bilateral 11/12/2019    Procedure: TF XANDER L4/5;  Surgeon: Desean Dias MD;  Location: [facility];  Service: Pain Management;  Laterality: Bilateral;    HYSTERECTOMY      INJECTION OF ANESTHETIC AGENT AROUND MEDIAL BRANCH NERVES INNERVATING LUMBAR FACET JOINT Bilateral 1/31/2020    Procedure: Bilateral L3-5 MBB;  Surgeon: Desean Dias MD;  Location: [facility];  Service: Pain Management;  Laterality: Bilateral;    INJECTION OF JOINT Bilateral 7/9/2020    Procedure: Bilateral shoulder GH Joint injection with local;  Surgeon: Desean Dias MD;  [facility];  Service: Pain Management;  Laterality: Bilateral;    TRANSFORAMINAL EPIDURAL INJECTION OF STEROID Bilateral 7/23/2019    Procedure: Bilateral L3/4 Transforaminal Epidural Steroid Injection;  Surgeon: Desean Dias MD;  Location: [facility];  Service: Pain Management;  Laterality: Bilateral;    TRANSFORAMINAL EPIDURAL INJECTION OF STEROID Bilateral 3/10/2020    Procedure: Right T12/L1 TF XANDER with local;  Surgeon: Desean Dias MD;  Location: [facility];  Service: Pain Management;  Laterality: Bilateral;    TRANSFORAMINAL EPIDURAL INJECTION OF STEROID Right 6/19/2020    Procedure: Right T12/L1 TFESI Covid day of procedure;  Surgeon: Desean Dias MD;  [facility];  Service: Pain Management;  Laterality: Right;         Social History     Tobacco Use    Smoking status: Former Smoker     Packs/day: 1.00     Years: 10.00     Pack years: 10.00     Types: Cigarettes     Quit date:  2004     Years since quittin.3    Smokeless tobacco: Never Used   Substance Use Topics    Alcohol use: No    Drug use: Never       Family History   Problem Relation Age of Onset    No Known Problems Mother     No Known Problems Father        Review of patient's allergies indicates:   Allergen Reactions    Doxycycline Nausea Only    Penicillins Anaphylaxis, Hives, Shortness Of Breath and Swelling    Oxycodone Other (See Comments)     Fatigue      Penicillin g benzathine Other (See Comments)    Adhesive Rash    Betadine [povidone-iodine] Rash     Pt confirms has completed CT w/ IV contrast-iodine without reaction or need for pre-medication.    Sulfa (sulfonamide antibiotics) Itching           OBJECTIVE:     Vital Signs   Vitals:    20 1129   BP: 124/72   Pulse: 88     Physical Exam:  General Appearance:  NAD.   Gait:  Trouble walking, presented in a wheelchair  HEENT: PERRL.  Eyes not dry or injected.  No nasal ulcers.  Mouth not dry, no oral lesions.  Lymph: cervical, supraclavicular or axillary nodes: none abnormal   Cardio: no irregularity of S1 or S2.  No gallops or rubs.   Resp: Normal respiratory motion. Clear to auscultation bilaterally.   No abnormal chest conformation.  Abd: Soft, non-tender, nondistended.  No masses.   Skin: Head and neck,  and extremities examined. No rashes.   Neuro: Ox3.   Cranial nerves II-XII grossly intact.   Sensation intact  in both distal LE and upper extremities to light touch.  Musculoskeletal Exam:    Bilateral hands/wrists osteoarthritic changes    Presented in a wheelchair    Laboratory: I have reviewed all of the patient's relevant lab work available in the medical record and have utilized this in my evaluation and management recommendations today        Imaging : I have reviewed all of the patient's diagnostic/imaging results available in the medical record and have utilized this in my evaluation and management recommendations today.    Notes  reviewed  Other procedures: DEXA 2020  FINDINGS:  The L1-L2 vertebral bone mineral density is equal to 0.979 g/cm squared with a T score of -1.6.    The distal 3rd right radius bone mineral density is 0.611 with a T-score of  -3      Impression       Osteoporosis           ASSESSMENT/PLAN:     Age-related osteoporosis without current pathological fracture    Vitamin D insufficiency      C/w Prolia 60 mg subcu every 6 months  Dose # 2   Increase  vitamin-D 2000 units a day  Last normal PTH    Chronic pain:  Tramadol as needed  Follow-up per pain management    Chronic anemia:  Follow-up per Hematology    6 M return        Risks vs Benefits and potential side effects of medication prescribed today were discussed with patient. Medication literature given to patient up discharge  Went over uptodate information /literature on the meds prescribed today     If you are unsure what to do please call our office for instruction. [facility] [phone]        Disclaimer: This note was prepared using voice recognition system and is likely to have sound alike errors and is not proof read.  Please call me with any questions.

## 2020-08-13 NOTE — NURSING
Any invasive dental procedures in past 3 months or upcoming 3 months: Denies    Last Rheumatology provider visit- Seen by kasandra on 8/13    Recent labs? yes CKD pt needing repeat labs in 10 days- no  Lab Results   Component Value Date    CALCIUM 9.9 08/13/2020     Lab Results   Component Value Date    CREATININE 0.8 08/13/2020     Lab Results   Component Value Date    ESTGFRAFRICA >60 08/13/2020     Lab Results   Component Value Date    EGFRNONAA >60 08/13/2020     Lab Results   Component Value Date    HVGBRTTG90OW 25 (L) 02/12/2020             Prolia 60 mg/ml administered SQ to Right lower abdominal quadrant. Tolerated without any complaints. No redness, swelling, or drainage noted to site. Instructed to remain in clinic 15 minutes after administration to monitor for any s/sx of reaction. Pt instructed on signs and symptoms of reaction to report. Verbalizes understanding.

## 2020-08-13 NOTE — DISCHARGE INSTRUCTIONS
[facility]  [address] [facility]  [address]  [phone] phone     [phone] fax  Hours of Operation: Monday- Friday 8:00am- 5:00pm  After hours phone  [phone]  Hematology / Oncology Physicians on call      Dr. Nate Darby, [facility] Angulo NP Tyesha Taylor, NP    Please call with any concerns regarding your appointment today.FALL PREVENTION   Falls often occur due to slipping, tripping or losing your balance. Here are ways to reduce your risk of falling again.   Was there anything that caused your fall that can be fixed, removed or replaced?   Make your home safe by keeping walkways clear of objects you may trip over.   Use non-slip pads under rugs.   Do not walk in poorly lit areas.   Do not stand on chairs or wobbly ladders.   Use caution when reaching overhead or looking upward. This position can cause a loss of balance.   Be sure your shoes fit properly, have non-slip bottoms and are in good condition.   Be cautious when going up and down stairs, curbs, and when walking on uneven sidewalks.   If your balance is poor, consider using a cane or walker.   If your fall was related to alcohol use, stop or limit alcohol intake.   If your fall was related to use of sleeping medicines, talk to your doctor about this. You may need to reduce your dosage at bedtime if you awaken during the night to go to the bathroom.   To reduce the need for nighttime bathroom trips:   Avoid drinking fluids for several hours before going to bed   Empty your bladder before going to bed   Men can keep a urinal at the bedside   © 6259-5975 [employer] [facility], [address] [place] [address], [place] [address]. All rights reserved. This information is not intended as a substitute for professional medical care. Always follow your healthcare professional's instructions.

## 2020-08-14 ENCOUNTER — TELEPHONE (OUTPATIENT)
Dept: RHEUMATOLOGY | Facility: CLINIC | Age: 75
End: 2020-08-14

## 2020-08-14 ENCOUNTER — TELEPHONE (OUTPATIENT)
Dept: NEUROSURGERY | Facility: CLINIC | Age: 75
End: 2020-08-14

## 2020-08-14 NOTE — TELEPHONE ENCOUNTER
----- Message from Iraida Gaona MD sent at 8/14/2020 10:01 AM CDT -----  Please let her know her vitamin-D levels are normal now  Take vitamin-D supplements 1000 units/ a day only the way she is taking  No further increase in supplements needed

## 2020-08-14 NOTE — TELEPHONE ENCOUNTER
Called and spoke wit pts son, informed him that I switched provider for pts appointment, pt son agreed and understood.

## 2020-08-20 ENCOUNTER — OFFICE VISIT (OUTPATIENT)
Dept: NEUROSURGERY | Facility: CLINIC | Age: 75
End: 2020-08-20
Payer: MEDICARE

## 2020-08-20 ENCOUNTER — INFUSION (OUTPATIENT)
Dept: INFUSION THERAPY | Facility: HOSPITAL | Age: 75
End: 2020-08-20
Attending: INTERNAL MEDICINE
Payer: MEDICARE

## 2020-08-20 ENCOUNTER — TELEPHONE (OUTPATIENT)
Dept: ORTHOPEDICS | Facility: CLINIC | Age: 75
End: 2020-08-20

## 2020-08-20 ENCOUNTER — OFFICE VISIT (OUTPATIENT)
Dept: INTERNAL MEDICINE | Facility: CLINIC | Age: 75
End: 2020-08-20
Payer: MEDICARE

## 2020-08-20 VITALS
DIASTOLIC BLOOD PRESSURE: 71 MMHG | BODY MASS INDEX: 23.05 KG/M2 | HEART RATE: 98 BPM | TEMPERATURE: 99 F | SYSTOLIC BLOOD PRESSURE: 142 MMHG | OXYGEN SATURATION: 98 % | RESPIRATION RATE: 18 BRPM | HEIGHT: 64 IN

## 2020-08-20 VITALS
BODY MASS INDEX: 23.79 KG/M2 | WEIGHT: 134.25 LBS | DIASTOLIC BLOOD PRESSURE: 73 MMHG | SYSTOLIC BLOOD PRESSURE: 147 MMHG | HEART RATE: 104 BPM | HEIGHT: 63 IN | RESPIRATION RATE: 15 BRPM

## 2020-08-20 VITALS — DIASTOLIC BLOOD PRESSURE: 73 MMHG | SYSTOLIC BLOOD PRESSURE: 147 MMHG

## 2020-08-20 DIAGNOSIS — D50.8 OTHER IRON DEFICIENCY ANEMIA: Primary | ICD-10-CM

## 2020-08-20 DIAGNOSIS — R51.9 NONINTRACTABLE HEADACHE, UNSPECIFIED CHRONICITY PATTERN, UNSPECIFIED HEADACHE TYPE: ICD-10-CM

## 2020-08-20 DIAGNOSIS — N18.30 CONTROLLED TYPE 2 DIABETES MELLITUS WITH STAGE 3 CHRONIC KIDNEY DISEASE, WITHOUT LONG-TERM CURRENT USE OF INSULIN: ICD-10-CM

## 2020-08-20 DIAGNOSIS — E11.22 CONTROLLED TYPE 2 DIABETES MELLITUS WITH STAGE 3 CHRONIC KIDNEY DISEASE, WITHOUT LONG-TERM CURRENT USE OF INSULIN: ICD-10-CM

## 2020-08-20 DIAGNOSIS — I10 ESSENTIAL HYPERTENSION: Primary | ICD-10-CM

## 2020-08-20 DIAGNOSIS — R68.83 CHILLS: ICD-10-CM

## 2020-08-20 DIAGNOSIS — G40.909 SEIZURE DISORDER: Primary | ICD-10-CM

## 2020-08-20 DIAGNOSIS — R05.9 COUGH: ICD-10-CM

## 2020-08-20 DIAGNOSIS — J44.9 CHRONIC OBSTRUCTIVE PULMONARY DISEASE, UNSPECIFIED COPD TYPE: ICD-10-CM

## 2020-08-20 DIAGNOSIS — M47.892 OTHER SPONDYLOSIS, CERVICAL REGION: ICD-10-CM

## 2020-08-20 DIAGNOSIS — D50.8 OTHER IRON DEFICIENCY ANEMIA: ICD-10-CM

## 2020-08-20 DIAGNOSIS — R50.9 FEVER, UNSPECIFIED FEVER CAUSE: ICD-10-CM

## 2020-08-20 PROCEDURE — 63600175 PHARM REV CODE 636 W HCPCS: Mod: JG,HCNC | Performed by: INTERNAL MEDICINE

## 2020-08-20 PROCEDURE — 99442 PR PHYSICIAN TELEPHONE EVALUATION 11-20 MIN: CPT | Mod: HCNC,95,, | Performed by: PHYSICIAN ASSISTANT

## 2020-08-20 PROCEDURE — 1125F PR PAIN SEVERITY QUANTIFIED, PAIN PRESENT: ICD-10-PCS | Mod: HCNC,S$GLB,, | Performed by: PHYSICIAN ASSISTANT

## 2020-08-20 PROCEDURE — 96365 THER/PROPH/DIAG IV INF INIT: CPT | Mod: HCNC

## 2020-08-20 PROCEDURE — 1159F PR MEDICATION LIST DOCUMENTED IN MEDICAL RECORD: ICD-10-PCS | Mod: HCNC,S$GLB,, | Performed by: PHYSICIAN ASSISTANT

## 2020-08-20 PROCEDURE — 3077F PR MOST RECENT SYSTOLIC BLOOD PRESSURE >= 140 MM HG: ICD-10-PCS | Mod: HCNC,CPTII,S$GLB, | Performed by: PHYSICIAN ASSISTANT

## 2020-08-20 PROCEDURE — 1101F PR PT FALLS ASSESS DOC 0-1 FALLS W/OUT INJ PAST YR: ICD-10-PCS | Mod: HCNC,CPTII,S$GLB, | Performed by: PHYSICIAN ASSISTANT

## 2020-08-20 PROCEDURE — 99214 OFFICE O/P EST MOD 30 MIN: CPT | Mod: HCNC,S$GLB,, | Performed by: PHYSICIAN ASSISTANT

## 2020-08-20 PROCEDURE — 99214 PR OFFICE/OUTPT VISIT, EST, LEVL IV, 30-39 MIN: ICD-10-PCS | Mod: HCNC,S$GLB,, | Performed by: PHYSICIAN ASSISTANT

## 2020-08-20 PROCEDURE — 99442 PR PHYSICIAN TELEPHONE EVALUATION 11-20 MIN: ICD-10-PCS | Mod: HCNC,95,, | Performed by: PHYSICIAN ASSISTANT

## 2020-08-20 PROCEDURE — 1125F AMNT PAIN NOTED PAIN PRSNT: CPT | Mod: HCNC,S$GLB,, | Performed by: PHYSICIAN ASSISTANT

## 2020-08-20 PROCEDURE — 1101F PT FALLS ASSESS-DOCD LE1/YR: CPT | Mod: HCNC,CPTII,S$GLB, | Performed by: PHYSICIAN ASSISTANT

## 2020-08-20 PROCEDURE — 99999 PR PBB SHADOW E&M-EST. PATIENT-LVL V: ICD-10-PCS | Mod: PBBFAC,HCNC,, | Performed by: PHYSICIAN ASSISTANT

## 2020-08-20 PROCEDURE — 63600175 PHARM REV CODE 636 W HCPCS: Mod: HCNC | Performed by: INTERNAL MEDICINE

## 2020-08-20 PROCEDURE — 3078F DIAST BP <80 MM HG: CPT | Mod: HCNC,CPTII,S$GLB, | Performed by: PHYSICIAN ASSISTANT

## 2020-08-20 PROCEDURE — 96375 TX/PRO/DX INJ NEW DRUG ADDON: CPT | Mod: HCNC

## 2020-08-20 PROCEDURE — 1159F MED LIST DOCD IN RCRD: CPT | Mod: HCNC,S$GLB,, | Performed by: PHYSICIAN ASSISTANT

## 2020-08-20 PROCEDURE — 99999 PR PBB SHADOW E&M-EST. PATIENT-LVL V: CPT | Mod: PBBFAC,HCNC,, | Performed by: PHYSICIAN ASSISTANT

## 2020-08-20 PROCEDURE — 3077F SYST BP >= 140 MM HG: CPT | Mod: HCNC,CPTII,S$GLB, | Performed by: PHYSICIAN ASSISTANT

## 2020-08-20 PROCEDURE — 25000003 PHARM REV CODE 250: Mod: HCNC | Performed by: INTERNAL MEDICINE

## 2020-08-20 PROCEDURE — 96361 HYDRATE IV INFUSION ADD-ON: CPT

## 2020-08-20 PROCEDURE — 3078F PR MOST RECENT DIASTOLIC BLOOD PRESSURE < 80 MM HG: ICD-10-PCS | Mod: HCNC,CPTII,S$GLB, | Performed by: PHYSICIAN ASSISTANT

## 2020-08-20 RX ORDER — METHYLPREDNISOLONE 4 MG/1
TABLET ORAL
Qty: 21 TABLET | Refills: 0 | Status: SHIPPED | OUTPATIENT
Start: 2020-08-20 | End: 2020-09-10

## 2020-08-20 RX ORDER — METHYLPREDNISOLONE SOD SUCC 125 MG
125 VIAL (EA) INJECTION
Status: COMPLETED | OUTPATIENT
Start: 2020-08-20 | End: 2020-08-20

## 2020-08-20 RX ORDER — AZITHROMYCIN 250 MG/1
TABLET, FILM COATED ORAL
Qty: 6 TABLET | Refills: 0 | Status: SHIPPED | OUTPATIENT
Start: 2020-08-20 | End: 2020-08-25

## 2020-08-20 RX ORDER — SODIUM CHLORIDE 0.9 % (FLUSH) 0.9 %
10 SYRINGE (ML) INJECTION
Status: DISCONTINUED | OUTPATIENT
Start: 2020-08-20 | End: 2020-08-20 | Stop reason: HOSPADM

## 2020-08-20 RX ADMIN — FERRIC CARBOXYMALTOSE INJECTION 750 MG: 50 INJECTION, SOLUTION INTRAVENOUS at 11:08

## 2020-08-20 RX ADMIN — SODIUM CHLORIDE: 0.9 INJECTION, SOLUTION INTRAVENOUS at 11:08

## 2020-08-20 RX ADMIN — METHYLPREDNISOLONE SODIUM SUCCINATE 125 MG: 125 INJECTION, POWDER, FOR SOLUTION INTRAMUSCULAR; INTRAVENOUS at 11:08

## 2020-08-20 NOTE — PROGRESS NOTES
Subjective:      Patient ID: Shireen Felix is a 74 y.o. female.    Chief Complaint: Lumbar Spine Pain (L-Spine) (LBP)    HPI  Patient is here today for discussion of lower back pain and now neck pain.  She was last seen by Dr. Olson in September of 2019.  Although her pain has increased she does not want surgery.  She tells me today that she damaged her RC in R shoulder from fall in October or November of last year. Today she c/o L shoulder and neck pain.  She also has swelling in both legs.  Patient is currently under the care of orthopedics and rheumatology.  She was diagnosed with Osteoporosis in the past and restarted Prolia.  Patient also sees Pain Management.  She does have a h/o seizures. Her last seizure was about 3 months ago. She needs to establish care with a neurologist here at [facility]. The last time she saw a neurologist was some time in 2019.    Previous Therapy:  Medications:  Celebrex, ibuprofen, gabapentin, Lyrica, Valtrex, steroids  Injections: bilateral L3/4 TF XANDER on 8/4/19 with 75% pain relief, bilateral L4/5 transforaminal epidural steroid injections with 80% symptomatic pain relief, bilateral L3-5 lumbar medial branch blocks with 100% symptomatic pain relief, T12-L1 transforaminal epidural steroid injection with 100% symptomatic pain relief, right T12/L1 TF XANDER 01/06/1920 with 75% pain relief, bilateral shoulder injection on 07/09/2020 with 90% relief on the left side, 0% relief on the right  Surgeries: No spine surgery.  Physical Therapy: Completed in the Past, currently participating    Review of Systems   Constitution: Negative for fever.   HENT: Negative for congestion.    Cardiovascular: Negative for chest pain.   Respiratory: Negative for cough, shortness of breath and wheezing.    Skin: Negative for poor wound healing.   Musculoskeletal: Positive for back pain, joint pain (shoulder reva), muscle weakness, neck pain and stiffness. Negative for falls and myalgias.    Gastrointestinal: Negative for abdominal pain, bowel incontinence, diarrhea, nausea and vomiting.   Genitourinary: Negative for bladder incontinence.   Neurological: Positive for numbness and seizures (h/o).   Psychiatric/Behavioral: Negative for altered mental status.         Objective:            Ortho/SPM Exam      Nursing note and vitals reviewed  Gen:Oriented to person, place, and time.             Appears stated age   Head:Normocephalic and atraumatic.  Nose: Nose normal.    Eyes: EOM are normal. Pupils are equal, round, and reactive to light.   Neck: Neck supple. No masses or lesions palpated  Cardiovascular: Intact distal pulses.    Abdominal: Soft.   Neurological: Alert and oriented to person, place, and time.  No cranial nerve deficit.  Coordination normal. Normal muscle tone  Psychiatric: Normal mood and affect. Behavior is normal.      Neck:  None Present- L side Paraspinal tenderness   None  Paraspinal muscle spasms   None Present Pain with flexion and extention   WNL Decreased TANNA Range of motion shoulders   Neg TANNA Spurling's sign     Motor:   Right Right Left Left  Level Group   5 4 5 4 C5 Deltoid   5 4 5 4 C6 Bicep   5  5   Wrist extension    5 4 5 4 C7 Triceps   5  5   Wrist flexion   5 4 5 4 C8    5 4 5 4 T1 Interossei      Sensation:  NL Decreased (R/L/BL) Level Sensation    X  C5 Lateral upper arm   X  C6 Thumb and index finger, lat forearm   X  C7 Middle finger   X  C8 Ring and little finger   X  T1 Medial arm      Reflex:  2+  Bicep tendon   2+  Brachioradialis   2+  Triceps tendon   Not present  Thompson's   none  Clonus   neg  Tinel's           No new imaging.        Assessment:       Encounter Diagnoses   Name Primary?    Other spondylosis, cervical region     Seizure disorder Yes          Plan:       Shireen was seen today for lumbar spine pain (l-spine).    Diagnoses and all orders for this visit:    Seizure disorder  -     Ambulatory referral/consult to Neurology; Future    Other  spondylosis, cervical region  -     CT Myelography Cervical Spine; Future  -     FL Myelogram Cervical; Future    Other orders  -     methylPREDNISolone (MEDROL DOSEPACK) 4 mg tablet; use as directed      The patient will be referred to neurology to establish care regarding seizure tx/mgt.  She needs further evaluation with CT Myelo of C spine to rule out nerve compression.  I would like her to see orthopedics regarding her shoulder issues.  Will give her a MDP- I informed patient I cannot inject her shoulders. She needs to carefully monitor her BG levels with the oral steroids.  Patient will follow-up after CT Myelo.    At this time she still does not want to make any decisions on surgery for her lower back.            Augustin Nichols PA-C

## 2020-08-20 NOTE — TELEPHONE ENCOUNTER
Spoke with patients brother and informed him that Yamilet Monsalve PA-C is out of the office for the rest of the week and to contact Dr Dias' office because she has recently had injections to her shoulders given by him. Patient's brother verbalized understanding.

## 2020-08-20 NOTE — PROGRESS NOTES
Subjective:      Patient ID: Shireen Felix is a 74 y.o. female.    Chief Complaint: No chief complaint on file.    The patient location is: [place]   The chief complaint leading to consultation is: cough/fever    Visit type: audio only  The reason for the audio only service rather than synchronous audio and video virtual visit was related to technical difficulties or patient preference/necessity.    Face to Face time with patient:   12 minutes of total time spent on the encounter, which includes face to face time and non-face to face time preparing to see the patient (eg, review of tests), Obtaining and/or reviewing separately obtained history, Documenting clinical information in the electronic or other health record, Independently interpreting results (not separately reported) and communicating results to the patient/family/caregiver, or Care coordination (not separately reported).     Each patient to whom he or she provides medical services by telemedicine is:  (1) informed of the relationship between the physician and patient and the respective role of any other health care provider with respect to management of the patient; and (2) notified that he or she may decline to receive medical services by telemedicine and may withdraw from such care at any time.    Notes: Patient is new to me, visit today regarding new onset cough/fever and body aches.  Symptoms began last night.    Reports she received an infusion today for anemia.    Review of Systems   Constitutional: Positive for chills and fever (this morning, 100.2). Negative for diaphoresis.   HENT: Positive for congestion, rhinorrhea, sneezing and sore throat.         Denies loss of taste/smell   Respiratory: Positive for cough, shortness of breath (yesterday, improving) and wheezing.         H/o COPD- daily and rescue inhaler w nebulizer   Cardiovascular: Positive for leg swelling (on and off, worse than normal).   Gastrointestinal: Negative for  abdominal pain, constipation, diarrhea, nausea and vomiting.   Musculoskeletal: Positive for myalgias.   Skin: Negative for rash.   Neurological: Positive for headaches. Negative for dizziness and light-headedness.       Objective:   BP (!) 147/73   LMP  (LMP Unknown)   Physical Exam   Video access unavailable   Assessment:      1. Essential hypertension    2. Cough    3. Chills    4. Head ache    5. Fever    6. Chronic obstructive pulmonary disease, unspecified COPD type    7. Other iron deficiency anemia    8. Controlled type 2 diabetes mellitus with stage 3 chronic kidney disease, without long-term current use of insulin       Plan:   Essential hypertension   Elevated, monitor, low salt diet    Cough  -     COVID-19 Routine Screening; Future; Expected date: 08/20/2020  -     azithromycin (Z-EMEKA) 250 MG tablet; Take 2 tablets by mouth on day 1; Take 1 tablet by mouth on days 2-5  Dispense: 6 tablet; Refill: 0    Chills  -     COVID-19 Routine Screening; Future; Expected date: 08/20/2020    Head ache  -     COVID-19 Routine Screening; Future; Expected date: 08/20/2020    Fever  -     COVID-19 Routine Screening; Future; Expected date: 08/20/2020    Chronic obstructive pulmonary disease, unspecified COPD type  -     azithromycin (Z-EMEKA) 250 MG tablet; Take 2 tablets by mouth on day 1; Take 1 tablet by mouth on days 2-5  Dispense: 6 tablet; Refill: 0    Other iron deficiency anemia   Followed by Hematology, receiving infusions     Controlled type 2 diabetes mellitus with stage 3 chronic kidney disease, without long-term current use of insulin   A1c 4.5%    COVID vs COPD flare    Concern for COVID, symptomatic care at this time      Discussed with patient he/she should self quarantine until we have the results of their test     Discussed worsening signs/symptoms and when to return to clinic or go to ED.   Patient expresses understanding and agrees with treatment plan.

## 2020-08-20 NOTE — DISCHARGE INSTRUCTIONS
[facility]  [address] [facility] [address] [facility]  [address]  [phone] phone     [phone] fax  Hours of Operation: Monday- Friday 8:00am- 5:00pm  After hours phone  [phone]  Hematology / Oncology Physicians on call      [facility] [address] Dr[address] Dr., Dr., Dr., NP Sydney Prescott, NP Tyesha Taylor, NP    Please call with any concerns regarding your appointment today.FALL PREVENTION   Falls often occur due to slipping, tripping or losing your balance. Here are ways to reduce your risk of falling again.   Was there anything that caused your fall that can be fixed, removed or replaced?   Make your home safe by keeping walkways clear of objects you may trip over.   Use non-slip pads under rugs.   Do not walk in poorly lit areas.   Do not stand on chairs or wobbly ladders.   Use caution when reaching overhead or looking upward. This position can cause a loss of balance.   Be sure your shoes fit properly, have non-slip bottoms and are in good condition.   Be cautious when going up and down stairs, curbs, and when walking on uneven sidewalks.   If your balance is poor, consider using a cane or walker.   If your fall was related to alcohol use, stop or limit alcohol intake.   If your fall was related to use of sleeping medicines, talk to your doctor about this. You may need to reduce your dosage at bedtime if you awaken during the night to go to the bathroom.   To reduce the need for nighttime bathroom trips:   Avoid drinking fluids for several hours before going to bed   Empty your bladder before going to bed   Men can keep a urinal at the bedside   © 0153-9809 [employer] [facility], [address] [place] [address], [place] [address]. All rights reserved. This information is not intended as a substitute for professional medical care. Always follow your healthcare professional's instructions.    Iron Supplements  Most people think of  iron as a metal that is used to make pots, frying pans, and soup kettles. This same metal (or mineral) also plays a vital role in the body. Iron helps the blood cells carry oxygen. When you dont get enough iron, you may feel tired and lack energy. Over time, without enough iron, your body makes fewer red blood cells. This can cause a condition called iron-deficiency anemia. Since your body doesnt make iron, you must get it from foods or supplements.     Food sources of iron  Iron is found in a few types of foods. Good sources include:  · Red meat, poultry, fish, eggs  · Dried fruits (especially raisins, prunes, figs)  · Legumes such as dried beans and lentils  · Breads and cereals with iron added  · Blackstrap molasses  · Spinach  · Foods cooked in cast-iron pans. This is especially true of acidic foods, such as tomatoes and don.   Why use a supplement?  Women often need additional iron because they lose menstrual blood during their period. But even grown men and boys may need a supplement at some point. You may need an iron supplement if any of the following is true for you:  · I rarely eat foods that are high in iron (such as red meat, poultry, dried beans, and foods with iron added).  · I am a vegetarian and I rarely eat legumes (dried beans, peas, lentils).  · I am a woman who has heavy menstrual periods.  · I am pregnant or breastfeeding.  · I have been diagnosed with iron-deficiency anemia.  If you take iron  Here are some tips to help you get the most from an iron supplement:  · Take it with vitamin C for better absorption.  · Dont take an iron supplement with milk. The calcium in milk limits iron absorption.  · Iron supplements can cause constipation. To prevent this, drink plenty of water, eat high-fiber foods, and exercise often. Also try an iron supplement with an added stool softener.  · Eat a healthy diet to get all the nutrients your body needs.  Recommended iron intake  The amount of iron each  person needs is different, and varies by age. Women who are pregnant or breastfeeding need extra iron. But taking more than the suggested amount is not always healthy. Your health care provider can help you choose the right amount of iron for you.   Date Last Reviewed: 6/2/2015  © 5865-5048 [employer]. [address] [place] [address], [place] [address]. All rights reserved. This information is not intended as a substitute for professional medical care. Always follow your healthcare professional's instructions.

## 2020-08-20 NOTE — PLAN OF CARE
Patient reports pain in shoulders due to previous injury. Provided patient with warm blanket wrapped over shoulders help for comfort measures. Patient vocalized relief with warmth.

## 2020-08-20 NOTE — TELEPHONE ENCOUNTER
----- Message from Dixie Quintero sent at 8/20/2020  8:18 AM CDT -----  Regarding: shoulder  Please call back Rm in reference to speaking with provider about pt shoulder. [phone]  Thanks

## 2020-08-24 ENCOUNTER — TELEPHONE (OUTPATIENT)
Dept: NEUROSURGERY | Facility: CLINIC | Age: 75
End: 2020-08-24

## 2020-08-24 NOTE — TELEPHONE ENCOUNTER
Good afternoon ,     Augustin Nichols PA-C recently ordered an CT Myelogram (Cervical) on patient, in order to move forward we need to obtain a medication approval hold.     8/27/2020 10:30 AM [facility] [id number] [facility]   8/27/2020 11:30 AM [facility] CT1 LIMIT 500 LBS [facility]         Medication:     metFORMIN (GLUCOPHAGE) 500 MG tablet - 48 hr medication hold     traMADoL (ULTRAM) 50 mg - 48 hr medication hold    venlafaxine 150 mg - 48 hr medication hold        Please advise the neurosurgery dept, if the medication hold can take place to further assist patient w/ appropriate care.     Thanks in advance,     Leonor Gaston MA

## 2020-08-25 NOTE — TELEPHONE ENCOUNTER
Spoke w/ patient and her brother she verbalized understanding to hold medication for 48 hours prior to testing

## 2020-09-02 ENCOUNTER — TELEPHONE (OUTPATIENT)
Dept: PAIN MEDICINE | Facility: CLINIC | Age: 75
End: 2020-09-02

## 2020-09-02 NOTE — TELEPHONE ENCOUNTER
Spoke with patient's brother. He said she feels better, ask me to cancel an appointment. She will call if pain returns.

## 2020-09-11 DIAGNOSIS — J41.1 MUCOPURULENT CHRONIC BRONCHITIS: Primary | ICD-10-CM

## 2020-09-16 ENCOUNTER — LAB VISIT (OUTPATIENT)
Dept: LAB | Facility: HOSPITAL | Age: 75
End: 2020-09-16
Attending: INTERNAL MEDICINE
Payer: MEDICARE

## 2020-09-16 DIAGNOSIS — R23.3 SPONTANEOUS ECCHYMOSIS: ICD-10-CM

## 2020-09-16 DIAGNOSIS — R79.9 ABNORMAL FINDING OF BLOOD CHEMISTRY, UNSPECIFIED: ICD-10-CM

## 2020-09-16 LAB
ALBUMIN SERPL BCP-MCNC: 3.3 G/DL (ref 3.5–5.2)
ALP SERPL-CCNC: 56 U/L (ref 55–135)
ALT SERPL W/O P-5'-P-CCNC: 12 U/L (ref 10–44)
ANION GAP SERPL CALC-SCNC: 9 MMOL/L (ref 8–16)
ANISOCYTOSIS BLD QL SMEAR: SLIGHT
AST SERPL-CCNC: 25 U/L (ref 10–40)
BASOPHILS # BLD AUTO: 0.03 K/UL (ref 0–0.2)
BASOPHILS NFR BLD: 0.5 % (ref 0–1.9)
BILIRUB SERPL-MCNC: 0.2 MG/DL (ref 0.1–1)
BUN SERPL-MCNC: 15 MG/DL (ref 8–23)
CALCIUM SERPL-MCNC: 9.1 MG/DL (ref 8.7–10.5)
CHLORIDE SERPL-SCNC: 107 MMOL/L (ref 95–110)
CO2 SERPL-SCNC: 20 MMOL/L (ref 23–29)
CREAT SERPL-MCNC: 0.7 MG/DL (ref 0.5–1.4)
DACRYOCYTES BLD QL SMEAR: ABNORMAL
DIFFERENTIAL METHOD: ABNORMAL
EOSINOPHIL # BLD AUTO: 0.2 K/UL (ref 0–0.5)
EOSINOPHIL NFR BLD: 3.7 % (ref 0–8)
ERYTHROCYTE [DISTWIDTH] IN BLOOD BY AUTOMATED COUNT: ABNORMAL % (ref 11.5–14.5)
EST. GFR  (AFRICAN AMERICAN): >60 ML/MIN/1.73 M^2
EST. GFR  (NON AFRICAN AMERICAN): >60 ML/MIN/1.73 M^2
FERRITIN SERPL-MCNC: 287 NG/ML (ref 20–300)
GLUCOSE SERPL-MCNC: 83 MG/DL (ref 70–110)
HCT VFR BLD AUTO: 37.6 % (ref 37–48.5)
HGB BLD-MCNC: 11.7 G/DL (ref 12–16)
HYPOCHROMIA BLD QL SMEAR: ABNORMAL
IMM GRANULOCYTES # BLD AUTO: 0.02 K/UL (ref 0–0.04)
IMM GRANULOCYTES NFR BLD AUTO: 0.3 % (ref 0–0.5)
IRON SERPL-MCNC: 38 UG/DL (ref 30–160)
LYMPHOCYTES # BLD AUTO: 1.5 K/UL (ref 1–4.8)
LYMPHOCYTES NFR BLD: 26.1 % (ref 18–48)
MCH RBC QN AUTO: 27.1 PG (ref 27–31)
MCHC RBC AUTO-ENTMCNC: 31.1 G/DL (ref 32–36)
MCV RBC AUTO: 87 FL (ref 82–98)
MONOCYTES # BLD AUTO: 0.6 K/UL (ref 0.3–1)
MONOCYTES NFR BLD: 10.7 % (ref 4–15)
NEUTROPHILS # BLD AUTO: 3.5 K/UL (ref 1.8–7.7)
NEUTROPHILS NFR BLD: 58.7 % (ref 38–73)
NRBC BLD-RTO: 0 /100 WBC
PLATELET # BLD AUTO: 211 K/UL (ref 150–350)
PLATELET BLD QL SMEAR: ABNORMAL
PMV BLD AUTO: 9.2 FL (ref 9.2–12.9)
POTASSIUM SERPL-SCNC: 4 MMOL/L (ref 3.5–5.1)
PROT SERPL-MCNC: 7.4 G/DL (ref 6–8.4)
RBC # BLD AUTO: 4.31 M/UL (ref 4–5.4)
SATURATED IRON: 13 % (ref 20–50)
SODIUM SERPL-SCNC: 136 MMOL/L (ref 136–145)
TOTAL IRON BINDING CAPACITY: 286 UG/DL (ref 250–450)
TRANSFERRIN SERPL-MCNC: 193 MG/DL (ref 200–375)
WBC # BLD AUTO: 5.89 K/UL (ref 3.9–12.7)

## 2020-09-16 PROCEDURE — 80053 COMPREHEN METABOLIC PANEL: CPT | Mod: HCNC

## 2020-09-16 PROCEDURE — 85025 COMPLETE CBC W/AUTO DIFF WBC: CPT | Mod: HCNC,PO

## 2020-09-16 PROCEDURE — 82728 ASSAY OF FERRITIN: CPT | Mod: HCNC

## 2020-09-16 PROCEDURE — 83540 ASSAY OF IRON: CPT | Mod: HCNC

## 2020-09-16 PROCEDURE — 36415 COLL VENOUS BLD VENIPUNCTURE: CPT | Mod: HCNC,PO

## 2020-09-20 ENCOUNTER — LAB VISIT (OUTPATIENT)
Dept: URGENT CARE | Facility: CLINIC | Age: 75
End: 2020-09-20
Payer: MEDICARE

## 2020-09-20 DIAGNOSIS — J41.1 MUCOPURULENT CHRONIC BRONCHITIS: ICD-10-CM

## 2020-09-20 PROCEDURE — U0003 INFECTIOUS AGENT DETECTION BY NUCLEIC ACID (DNA OR RNA); SEVERE ACUTE RESPIRATORY SYNDROME CORONAVIRUS 2 (SARS-COV-2) (CORONAVIRUS DISEASE [COVID-19]), AMPLIFIED PROBE TECHNIQUE, MAKING USE OF HIGH THROUGHPUT TECHNOLOGIES AS DESCRIBED BY CMS-2020-01-R: HCPCS | Mod: HCNC

## 2020-09-21 LAB — SARS-COV-2 RNA RESP QL NAA+PROBE: NOT DETECTED

## 2020-09-23 ENCOUNTER — OFFICE VISIT (OUTPATIENT)
Dept: PULMONOLOGY | Facility: CLINIC | Age: 75
End: 2020-09-23
Payer: MEDICARE

## 2020-09-23 ENCOUNTER — HOSPITAL ENCOUNTER (OUTPATIENT)
Dept: RADIOLOGY | Facility: HOSPITAL | Age: 75
Discharge: HOME | End: 2020-09-23
Attending: INTERNAL MEDICINE
Payer: MEDICARE

## 2020-09-23 ENCOUNTER — CLINICAL SUPPORT (OUTPATIENT)
Dept: PULMONOLOGY | Facility: CLINIC | Age: 75
End: 2020-09-23
Payer: MEDICARE

## 2020-09-23 VITALS
HEIGHT: 64 IN | SYSTOLIC BLOOD PRESSURE: 120 MMHG | DIASTOLIC BLOOD PRESSURE: 70 MMHG | BODY MASS INDEX: 23.05 KG/M2 | OXYGEN SATURATION: 99 % | HEART RATE: 74 BPM | RESPIRATION RATE: 16 BRPM | WEIGHT: 135 LBS

## 2020-09-23 DIAGNOSIS — J41.1 MUCOPURULENT CHRONIC BRONCHITIS: ICD-10-CM

## 2020-09-23 DIAGNOSIS — J84.112 UIP (USUAL INTERSTITIAL PNEUMONITIS): Primary | Chronic | ICD-10-CM

## 2020-09-23 DIAGNOSIS — J41.1 MUCOPURULENT CHRONIC BRONCHITIS: Chronic | ICD-10-CM

## 2020-09-23 DIAGNOSIS — J30.9 CHRONIC ALLERGIC RHINITIS: Chronic | ICD-10-CM

## 2020-09-23 PROBLEM — J44.9 COPD (CHRONIC OBSTRUCTIVE PULMONARY DISEASE): Status: RESOLVED | Noted: 2019-12-23 | Resolved: 2020-09-23

## 2020-09-23 LAB
BRPFT: ABNORMAL
DLCO ADJ PRE: 15.37 ML/(MIN*MMHG) (ref 14.93–26.39)
DLCO SINGLE BREATH LLN: 14.93
DLCO SINGLE BREATH PRE REF: 70.2 %
DLCO SINGLE BREATH REF: 20.66
DLCOC SBVA LLN: 2.74
DLCOC SBVA PRE REF: 102.8 %
DLCOC SBVA REF: 4.16
DLCOC SINGLE BREATH LLN: 14.93
DLCOC SINGLE BREATH PRE REF: 74.4 %
DLCOC SINGLE BREATH REF: 20.66
DLCOVA LLN: 2.74
DLCOVA PRE REF: 97 %
DLCOVA PRE: 4.03 ML/(MIN*MMHG*L) (ref 2.74–5.58)
DLCOVA REF: 4.16
DLVAADJ PRE: 4.27 ML/(MIN*MMHG*L) (ref 2.74–5.58)
ERV LLN: -16449.42
ERV PRE REF: 65.5 %
ERV REF: 0.58
FEF 25 75 LLN: 0.74
FEF 25 75 PRE REF: 204.1 %
FEF 25 75 REF: 1.69
FEV1 FVC LLN: 63
FEV1 FVC PRE REF: 112.4 %
FEV1 FVC REF: 77
FEV1 LLN: 1.46
FEV1 PRE REF: 103.5 %
FEV1 REF: 2.06
FRCPLETH LLN: 1.9
FRCPLETH PREREF: 92.3 %
FRCPLETH REF: 2.73
FVC LLN: 1.91
FVC PRE REF: 91 %
FVC REF: 2.68
IVC PRE: 2.34 L (ref 1.91–3.45)
IVC SINGLE BREATH LLN: 1.91
IVC SINGLE BREATH PRE REF: 87.2 %
IVC SINGLE BREATH REF: 2.68
MVV LLN: 67
MVV PRE REF: 91.7 %
MVV REF: 79
PEF LLN: 3.55
PEF PRE REF: 142.5 %
PEF REF: 5.27
PRE DLCO: 14.51 ML/(MIN*MMHG) (ref 14.93–26.39)
PRE ERV: 0.38 L (ref -16449.42–16450.58)
PRE FEF 25 75: 3.44 L/S (ref 0.74–2.64)
PRE FET 100: 7.18 SEC
PRE FEV1 FVC: 87.07 % (ref 63.38–91.49)
PRE FEV1: 2.13 L (ref 1.46–2.65)
PRE FRC PL: 2.52 L
PRE FVC: 2.44 L (ref 1.91–3.45)
PRE MVV: 72 L/MIN (ref 66.76–90.32)
PRE PEF: 7.51 L/S (ref 3.55–6.99)
PRE RV: 2.03 L (ref 1.57–2.73)
PRE TLC: 4.47 L (ref 3.98–5.96)
RAW LLN: 3.06
RAW PRE REF: 104.5 %
RAW PRE: 3.2 CMH2O*S/L (ref 3.06–3.06)
RAW REF: 3.06
RV LLN: 1.57
RV PRE REF: 94.5 %
RV REF: 2.15
RVTLC LLN: 35
RVTLC PRE REF: 102.1 %
RVTLC PRE: 45.39 % (ref 34.87–54.05)
RVTLC REF: 44
TLC LLN: 3.98
TLC PRE REF: 90.1 %
TLC REF: 4.97
VA PRE: 3.6 L (ref 4.82–4.82)
VA SINGLE BREATH LLN: 4.82
VA SINGLE BREATH PRE REF: 74.7 %
VA SINGLE BREATH REF: 4.82
VC LLN: 1.91
VC PRE REF: 91 %
VC PRE: 2.44 L (ref 1.91–3.45)
VC REF: 2.68
VTGRAWPRE: 2.04 L

## 2020-09-23 PROCEDURE — 71046 XR CHEST PA AND LATERAL: ICD-10-PCS | Mod: 26,HCNC,, | Performed by: RADIOLOGY

## 2020-09-23 PROCEDURE — 71046 X-RAY EXAM CHEST 2 VIEWS: CPT | Mod: TC,HCNC

## 2020-09-23 PROCEDURE — 99213 PR OFFICE/OUTPT VISIT, EST, LEVL III, 20-29 MIN: ICD-10-PCS | Mod: HCNC,S$GLB,, | Performed by: INTERNAL MEDICINE

## 2020-09-23 PROCEDURE — 1159F PR MEDICATION LIST DOCUMENTED IN MEDICAL RECORD: ICD-10-PCS | Mod: HCNC,S$GLB,, | Performed by: INTERNAL MEDICINE

## 2020-09-23 PROCEDURE — 99999 PR PBB SHADOW E&M-EST. PATIENT-LVL V: ICD-10-PCS | Mod: PBBFAC,HCNC,, | Performed by: INTERNAL MEDICINE

## 2020-09-23 PROCEDURE — 1159F MED LIST DOCD IN RCRD: CPT | Mod: HCNC,S$GLB,, | Performed by: INTERNAL MEDICINE

## 2020-09-23 PROCEDURE — 94010 BREATHING CAPACITY TEST: ICD-10-PCS | Mod: HCNC,S$GLB,, | Performed by: INTERNAL MEDICINE

## 2020-09-23 PROCEDURE — 71046 X-RAY EXAM CHEST 2 VIEWS: CPT | Mod: 26,HCNC,, | Performed by: RADIOLOGY

## 2020-09-23 PROCEDURE — 94729 PR C02/MEMBANE DIFFUSE CAPACITY: ICD-10-PCS | Mod: HCNC,S$GLB,, | Performed by: INTERNAL MEDICINE

## 2020-09-23 PROCEDURE — 94729 DIFFUSING CAPACITY: CPT | Mod: HCNC,S$GLB,, | Performed by: INTERNAL MEDICINE

## 2020-09-23 PROCEDURE — 3074F PR MOST RECENT SYSTOLIC BLOOD PRESSURE < 130 MM HG: ICD-10-PCS | Mod: HCNC,CPTII,S$GLB, | Performed by: INTERNAL MEDICINE

## 2020-09-23 PROCEDURE — 94010 BREATHING CAPACITY TEST: CPT | Mod: HCNC,S$GLB,, | Performed by: INTERNAL MEDICINE

## 2020-09-23 PROCEDURE — 94726 PLETHYSMOGRAPHY LUNG VOLUMES: CPT | Mod: HCNC,S$GLB,, | Performed by: INTERNAL MEDICINE

## 2020-09-23 PROCEDURE — 1101F PT FALLS ASSESS-DOCD LE1/YR: CPT | Mod: HCNC,CPTII,S$GLB, | Performed by: INTERNAL MEDICINE

## 2020-09-23 PROCEDURE — 1101F PR PT FALLS ASSESS DOC 0-1 FALLS W/OUT INJ PAST YR: ICD-10-PCS | Mod: HCNC,CPTII,S$GLB, | Performed by: INTERNAL MEDICINE

## 2020-09-23 PROCEDURE — 3074F SYST BP LT 130 MM HG: CPT | Mod: HCNC,CPTII,S$GLB, | Performed by: INTERNAL MEDICINE

## 2020-09-23 PROCEDURE — 99213 OFFICE O/P EST LOW 20 MIN: CPT | Mod: HCNC,S$GLB,, | Performed by: INTERNAL MEDICINE

## 2020-09-23 PROCEDURE — 3078F PR MOST RECENT DIASTOLIC BLOOD PRESSURE < 80 MM HG: ICD-10-PCS | Mod: HCNC,CPTII,S$GLB, | Performed by: INTERNAL MEDICINE

## 2020-09-23 PROCEDURE — 99999 PR PBB SHADOW E&M-EST. PATIENT-LVL V: CPT | Mod: PBBFAC,HCNC,, | Performed by: INTERNAL MEDICINE

## 2020-09-23 PROCEDURE — 3078F DIAST BP <80 MM HG: CPT | Mod: HCNC,CPTII,S$GLB, | Performed by: INTERNAL MEDICINE

## 2020-09-23 PROCEDURE — 94726 PULM FUNCT TST PLETHYSMOGRAP: ICD-10-PCS | Mod: HCNC,S$GLB,, | Performed by: INTERNAL MEDICINE

## 2020-09-23 RX ORDER — IPRATROPIUM BROMIDE AND ALBUTEROL SULFATE 2.5; .5 MG/3ML; MG/3ML
3 SOLUTION RESPIRATORY (INHALATION) EVERY 6 HOURS PRN
Qty: 1 BOX | Refills: 11 | Status: SHIPPED | OUTPATIENT
Start: 2020-09-23 | End: 2022-01-16

## 2020-09-23 NOTE — ASSESSMENT & PLAN NOTE
MCB ROS: taking medications as instructed, no medication side effects noted.   New concerns: NONE.   Exam:  rales noted BILATERALLY.   Assessment:  MCB stable.   Plan: VENTOLIN .DUONEB, BREO. Current treatment plan is effective, no change in therapy. Discontinued DALIRESP and SPIRIVA. DUONEB REFILLED. PFT 12 MONTHS

## 2020-09-23 NOTE — ASSESSMENT & PLAN NOTE
Allergy ROS: taking medications as instructed, no medication side effects noted.   New concerns: none.   Exam: nasal and sinus exam normal.   Assessment:  Allergic Rhinitis stable.   Plan:  CLARITIN, SINGULAIR and FLONASE. Current treatment plan is effective, no change in therapy.

## 2020-09-23 NOTE — PROGRESS NOTES
Subjective:      Established patient    Patient ID: Shireen Felix is a 74 y.o. female.  Patient Active Problem List   Diagnosis    Nasal septal perforation    Postherpetic neuralgia    Conductive hearing loss, unilateral    Chronic otitis media    Chronic allergic rhinitis    Coronary artery disease    Hx of completed stroke    Elevated IgE level    Focal seizures    GERD (gastroesophageal reflux disease)    History of cerebral aneurysm repair    History of tobacco abuse    HTN (hypertension)    Iron deficiency anemia    Spinal stenosis of lumbar region with neurogenic claudication    Mucopurulent chronic bronchitis    UIP (usual interstitial pneumonitis)    Recurrent falls    DM (diabetes mellitus) type II controlled with renal manifestation    CAD in native artery    Neuroforaminal stenosis of lumbar spine    Lumbar radiculopathy    History of stroke    Hypertension associated with diabetes    Seizure disorder    Normocytic anemia    Debility    Osteoarthritis of left wrist    Chronic back pain    Chronic pain syndrome    Decreased GFR    Age-related osteoporosis without current pathological fracture    Shoulder arthritis    Spontaneous ecchymosis       Problem list has been reviewed.    Chief Complaint: COPD      HPI    PFT and CXR  Reviewd with the patient who voiced understanding.     Patients reports stable NYHA II dyspnea    The patient does not have currently have symptoms / an exacerbation.      She reports intermittent  productive cough. Denies hemoptysis.    Her current respiratory therapy regimen is VENTOLIN,DUONEB, BREO which provides relief. She is  adherent with her regimen.        COPD Questionnaire  How often do you cough?: All of the time  How often do you have phlegm (mucus) in your chest?: All of the time  How often does your chest feel tight?: Some of the time  When you walk up a hill or one flight of stairs, how often are you breathless?: All of the  time  How often are you limited doing any activities at home?: All of the time  How often are you confident leaving the house despite your lung condition?: A little of the time  How often do you sleep soundly?: A little of the time  How often do you have energy?: A little of the time  Total score: 32     Immunization status reviewed and up to date.       A full  review of systems, past , family  and social histories was performed except as mentioned in the note above, these are non contributory to the main issues discussed today.       Previous Report Reviewed: lab reports, office notes and radiology reports     The following portions of the patient's history were reviewed and updated as appropriate: She  has a past medical history of Abnormal ECG (8/5/2019), Aneurysm, Anticoagulant long-term use, Arthritis, CAD in native artery (8/5/2019), COPD (chronic obstructive pulmonary disease), Diabetes mellitus, Glaucoma, Hypertension, Seizures, Shingles (05/27/2017), and Stroke.  She  has a past surgical history that includes Brain surgery; Hysterectomy; Transforaminal epidural injection of steroid (Bilateral, 7/23/2019); Cardiac catheterization; Coronary angioplasty; Epidural steroid injection into lumbar spine (Bilateral, 11/12/2019); Injection of anesthetic agent around medial branch nerves innervating lumbar facet joint (Bilateral, 1/31/2020); Transforaminal epidural injection of steroid (Bilateral, 3/10/2020); Transforaminal epidural injection of steroid (Right, 6/19/2020); and Injection of joint (Bilateral, 7/9/2020).  Her family history includes No Known Problems in her father and mother.  She  reports that she quit smoking about 16 years ago. Her smoking use included cigarettes. She has a 10.00 pack-year smoking history. She has never used smokeless tobacco. She reports that she does not drink alcohol or use drugs.  She has a current medication list which includes the following prescription(s): albuterol,  "amlodipine, aptiom, atorvastatin, cholecalciferol (vitamin d3), clopidogrel, cyclobenzaprine, denosumab, dexlansoprazole, diltiazem, escitalopram oxalate, fluticasone furoate-vilanterol, fluticasone propionate, gemfibrozil, guaifenesin 100 mg/5 ml, lactobacillus acidophilus, loratadine, melatonin, meloxicam, metformin, metoprolol tartrate, montelukast, neomycin-polymyxin-hydrocortisone, ranolazine, sennosides, tolterodine, tramadol, valsartan, venlafaxine, albuterol-ipratropium, and duloxetine.  She is allergic to doxycycline; penicillins; oxycodone; penicillin g benzathine; adhesive; betadine [povidone-iodine]; and sulfa (sulfonamide antibiotics)..    Review of Systems   Constitutional: Negative for fever and chills.   HENT: Positive for rhinorrhea, congestion and hearing loss. Negative for nosebleeds.    Eyes: Negative for redness.   Respiratory: Positive for cough, sputum production, wheezing, dyspnea on extertion and use of rescue inhaler. Negative for choking.    Genitourinary: Negative for hematuria.   Endocrine: Diabetes melllitus Negative for cold intolerance.    Musculoskeletal: Positive for arthralgias and back pain.   Skin: Negative for rash.   Gastrointestinal: Positive for acid reflux. Negative for vomiting.   Neurological: Negative for syncope and headaches.        History of stroke  Seizure disorder.  Post herpetic neuralgia   Hematological: Negative for adenopathy. Bleeds easily and excessive bruising.   Psychiatric/Behavioral: Negative for confusion.        Objective:     /70   Pulse 74   Resp 16   Ht 5' 4" (1.626 m)   Wt 61.2 kg (135 lb)   LMP  (LMP Unknown)   SpO2 99%   BMI 23.17 kg/m²   Body mass index is 23.17 kg/m².     Physical Exam  Constitutional:       Appearance: She is well-developed.   HENT:      Head: Normocephalic and atraumatic.      Right Ear: Tympanic membrane normal.      Left Ear: Tympanic membrane normal.   Eyes:      Pupils: Pupils are equal, round, and reactive to " light.   Neck:      Musculoskeletal: Normal range of motion and neck supple.   Cardiovascular:      Rate and Rhythm: Normal rate and regular rhythm.      Heart sounds: Normal heart sounds. No murmur.   Pulmonary:      Effort: Pulmonary effort is normal. No respiratory distress.   Abdominal:      General: Bowel sounds are normal.      Palpations: Abdomen is soft.   Musculoskeletal: Normal range of motion.         General: No tenderness.   Skin:     General: Skin is warm and dry.      Coloration: Skin is not pale.   Neurological:      Mental Status: She is alert and oriented to person, place, and time.   Psychiatric:         Behavior: Behavior normal.         Personal Diagnostic Review    XR CHEST PA AND LATERAL: 20:      Coarsening of the bronchovascular markings again noted.  No confluent airspace disease.  No pleural effusion.  Cardiomediastinal silhouette and osseous structures are stable in appearance.    Pulmonary function tests:20 :  FEV1: 2.13 (103.5 % predicted), FVC:  2.44 (91.0 % predicted), FEV1/FVC:  87.75, T.47 ( 90.1 % predicted) RV /TLC 45 ( 102% predicted, DLCO: 14.51 (70.2 % predicted)  Normal spirometry. Lung volumes not done. Diffusion capacity is mildly reduced but corrects for alveolar volume.         CT of chest performed on 20 with contrast:       1. Negative CTA chest.  No pulmonary embolism.  2. No active infiltrate.  Interstitial lung disease with coarsening of the peripheral septal markings, most notable bibasilar distribution, with mild bibasilar honeycombing.  Stable 6 mm noncalcified pulmonary nodule right lower lobe.  3. Multitude bilateral renal cortical cysts.  4. Multilevel degenerative thoracolumbar spine.  Multiple stable thoracic disc herniations, most notably at T6-7, with moderate central protrusion resulting in spinal canal stenosis (8 mm).  Scattered Schmorl's nodes.  Stable mild compression deformity/loss of height at T12.  There is also mild  compression deformity of L2, new compared with February 2020.      Assessment / Plan:       Discussed diagnosis, its evaluation, treatment and usual course. All questions answered.    Problem List Items Addressed This Visit        Pulmonary    Mucopurulent chronic bronchitis (Chronic)    Current Assessment & Plan     MCB ROS: taking medications as instructed, no medication side effects noted.   New concerns: NONE.   Exam:  rales noted BILATERALLY.   Assessment:  MCB stable.   Plan: VENTOLIN .DUONEB, BREO. Current treatment plan is effective, no change in therapy. Discontinued DALIRESP and SPIRIVA. DUONEB REFILLED. PFT 12 MONTHS         Relevant Medications    albuterol-ipratropium (DUO-NEB) 2.5 mg-0.5 mg/3 mL nebulizer solution    Other Relevant Orders    Complete PFT with bronchodilator    UIP (usual interstitial pneumonitis) - Primary    Current Assessment & Plan     Stable on CT chest.    Radiologic surveillance.             Relevant Orders    Complete PFT with bronchodilator    CT Chest Without Contrast       Other    Chronic allergic rhinitis    Current Assessment & Plan       Allergy ROS: taking medications as instructed, no medication side effects noted.   New concerns: none.   Exam: nasal and sinus exam normal.   Assessment:  Allergic Rhinitis stable.   Plan:  CLARITIN, SINGULAIR and FLONASE. Current treatment plan is effective, no change in therapy.                 TIME SPENT WITH PATIENT: Time spent: 30 minutes in face to face  discussion concerning diagnosis, prognosis, review of lab and test results, benefits of treatment as well as management of disease, counseling of patient and coordination of care between various health  care providers . Greater than half the time spent was used for coordination of care and counseling of patient.       Follow up in about 1 year (around 9/23/2021) for UIP, Chronic bronchitis, Allergic Rhinitis.

## 2020-09-23 NOTE — PATIENT INSTRUCTIONS
Lung Anatomy  Your lungs take air in to give your body oxygen, which the body needs to work. Your lungs, like all the tissues in your body, are made up of billions of tiny specialized cells. Old lung cells die and are replaced by new, identical lung cells. This natural process helps ensure healthy lungs.    Date Last Reviewed: 11/1/2016  © 5947-3380 [employer] [address] [place] [address], [place] [address]. All rights reserved. This information is not intended as a substitute for professional medical care. Always follow your healthcare professional's instructions.

## 2020-09-25 ENCOUNTER — OFFICE VISIT (OUTPATIENT)
Dept: HEMATOLOGY/ONCOLOGY | Facility: CLINIC | Age: 75
End: 2020-09-25
Payer: MEDICARE

## 2020-09-25 ENCOUNTER — LAB VISIT (OUTPATIENT)
Dept: LAB | Facility: HOSPITAL | Age: 75
End: 2020-09-25
Attending: EMERGENCY MEDICINE
Payer: MEDICARE

## 2020-09-25 VITALS
HEART RATE: 78 BPM | HEIGHT: 64 IN | BODY MASS INDEX: 34.67 KG/M2 | OXYGEN SATURATION: 98 % | DIASTOLIC BLOOD PRESSURE: 70 MMHG | TEMPERATURE: 98 F | WEIGHT: 203.06 LBS | SYSTOLIC BLOOD PRESSURE: 144 MMHG

## 2020-09-25 DIAGNOSIS — R23.3 SPONTANEOUS ECCHYMOSIS: ICD-10-CM

## 2020-09-25 DIAGNOSIS — J84.112 UIP (USUAL INTERSTITIAL PNEUMONITIS): ICD-10-CM

## 2020-09-25 LAB
ALBUMIN SERPL BCP-MCNC: 3.8 G/DL (ref 3.5–5.2)
ALP SERPL-CCNC: 68 U/L (ref 55–135)
ALT SERPL W/O P-5'-P-CCNC: 18 U/L (ref 10–44)
ANION GAP SERPL CALC-SCNC: 9 MMOL/L (ref 8–16)
ANISOCYTOSIS BLD QL SMEAR: ABNORMAL
AST SERPL-CCNC: 24 U/L (ref 10–40)
BASOPHILS # BLD AUTO: 0.03 K/UL (ref 0–0.2)
BASOPHILS NFR BLD: 0.5 % (ref 0–1.9)
BILIRUB SERPL-MCNC: 0.2 MG/DL (ref 0.1–1)
BUN SERPL-MCNC: 15 MG/DL (ref 8–23)
CALCIUM SERPL-MCNC: 9.3 MG/DL (ref 8.7–10.5)
CHLORIDE SERPL-SCNC: 106 MMOL/L (ref 95–110)
CO2 SERPL-SCNC: 20 MMOL/L (ref 23–29)
CREAT SERPL-MCNC: 0.8 MG/DL (ref 0.5–1.4)
DACRYOCYTES BLD QL SMEAR: ABNORMAL
DIFFERENTIAL METHOD: ABNORMAL
EOSINOPHIL # BLD AUTO: 0.2 K/UL (ref 0–0.5)
EOSINOPHIL NFR BLD: 2.3 % (ref 0–8)
ERYTHROCYTE [DISTWIDTH] IN BLOOD BY AUTOMATED COUNT: ABNORMAL % (ref 11.5–14.5)
EST. GFR  (AFRICAN AMERICAN): >60 ML/MIN/1.73 M^2
EST. GFR  (NON AFRICAN AMERICAN): >60 ML/MIN/1.73 M^2
GLUCOSE SERPL-MCNC: 105 MG/DL (ref 70–110)
HCT VFR BLD AUTO: 43.7 % (ref 37–48.5)
HGB BLD-MCNC: 13.3 G/DL (ref 12–16)
HYPOCHROMIA BLD QL SMEAR: ABNORMAL
IMM GRANULOCYTES # BLD AUTO: 0.02 K/UL (ref 0–0.04)
IMM GRANULOCYTES NFR BLD AUTO: 0.3 % (ref 0–0.5)
LYMPHOCYTES # BLD AUTO: 1.5 K/UL (ref 1–4.8)
LYMPHOCYTES NFR BLD: 23.8 % (ref 18–48)
MCH RBC QN AUTO: 27.5 PG (ref 27–31)
MCHC RBC AUTO-ENTMCNC: 30.4 G/DL (ref 32–36)
MCV RBC AUTO: 91 FL (ref 82–98)
MONOCYTES # BLD AUTO: 0.5 K/UL (ref 0.3–1)
MONOCYTES NFR BLD: 7.9 % (ref 4–15)
NEUTROPHILS # BLD AUTO: 4.2 K/UL (ref 1.8–7.7)
NEUTROPHILS NFR BLD: 65.5 % (ref 38–73)
NRBC BLD-RTO: 0 /100 WBC
PLATELET # BLD AUTO: 246 K/UL (ref 150–350)
PLATELET BLD QL SMEAR: ABNORMAL
PMV BLD AUTO: 8.7 FL (ref 9.2–12.9)
POIKILOCYTOSIS BLD QL SMEAR: SLIGHT
POTASSIUM SERPL-SCNC: 4.1 MMOL/L (ref 3.5–5.1)
PROT SERPL-MCNC: 8.3 G/DL (ref 6–8.4)
RBC # BLD AUTO: 4.83 M/UL (ref 4–5.4)
SODIUM SERPL-SCNC: 135 MMOL/L (ref 136–145)
WBC # BLD AUTO: 6.44 K/UL (ref 3.9–12.7)

## 2020-09-25 PROCEDURE — 36415 COLL VENOUS BLD VENIPUNCTURE: CPT | Mod: HCNC

## 2020-09-25 PROCEDURE — 99999 PR PBB SHADOW E&M-EST. PATIENT-LVL V: CPT | Mod: PBBFAC,HCNC,, | Performed by: INTERNAL MEDICINE

## 2020-09-25 PROCEDURE — 3078F DIAST BP <80 MM HG: CPT | Mod: HCNC,CPTII,S$GLB, | Performed by: INTERNAL MEDICINE

## 2020-09-25 PROCEDURE — 1125F AMNT PAIN NOTED PAIN PRSNT: CPT | Mod: HCNC,S$GLB,, | Performed by: INTERNAL MEDICINE

## 2020-09-25 PROCEDURE — 1101F PR PT FALLS ASSESS DOC 0-1 FALLS W/OUT INJ PAST YR: ICD-10-PCS | Mod: HCNC,CPTII,S$GLB, | Performed by: INTERNAL MEDICINE

## 2020-09-25 PROCEDURE — 3078F PR MOST RECENT DIASTOLIC BLOOD PRESSURE < 80 MM HG: ICD-10-PCS | Mod: HCNC,CPTII,S$GLB, | Performed by: INTERNAL MEDICINE

## 2020-09-25 PROCEDURE — 84165 PATHOLOGIST INTERPRETATION SPE: ICD-10-PCS | Mod: 26,HCNC,, | Performed by: PATHOLOGY

## 2020-09-25 PROCEDURE — 99214 OFFICE O/P EST MOD 30 MIN: CPT | Mod: HCNC,S$GLB,, | Performed by: INTERNAL MEDICINE

## 2020-09-25 PROCEDURE — 1159F MED LIST DOCD IN RCRD: CPT | Mod: HCNC,S$GLB,, | Performed by: INTERNAL MEDICINE

## 2020-09-25 PROCEDURE — 86334 IMMUNOFIX E-PHORESIS SERUM: CPT | Mod: 26,HCNC,, | Performed by: PATHOLOGY

## 2020-09-25 PROCEDURE — 85025 COMPLETE CBC W/AUTO DIFF WBC: CPT | Mod: HCNC

## 2020-09-25 PROCEDURE — 84165 PROTEIN E-PHORESIS SERUM: CPT | Mod: HCNC

## 2020-09-25 PROCEDURE — 1101F PT FALLS ASSESS-DOCD LE1/YR: CPT | Mod: HCNC,CPTII,S$GLB, | Performed by: INTERNAL MEDICINE

## 2020-09-25 PROCEDURE — 99999 PR PBB SHADOW E&M-EST. PATIENT-LVL V: ICD-10-PCS | Mod: PBBFAC,HCNC,, | Performed by: INTERNAL MEDICINE

## 2020-09-25 PROCEDURE — 86334 PATHOLOGIST INTERPRETATION IFE: ICD-10-PCS | Mod: 26,HCNC,, | Performed by: PATHOLOGY

## 2020-09-25 PROCEDURE — 3077F SYST BP >= 140 MM HG: CPT | Mod: HCNC,CPTII,S$GLB, | Performed by: INTERNAL MEDICINE

## 2020-09-25 PROCEDURE — 1159F PR MEDICATION LIST DOCUMENTED IN MEDICAL RECORD: ICD-10-PCS | Mod: HCNC,S$GLB,, | Performed by: INTERNAL MEDICINE

## 2020-09-25 PROCEDURE — 3077F PR MOST RECENT SYSTOLIC BLOOD PRESSURE >= 140 MM HG: ICD-10-PCS | Mod: HCNC,CPTII,S$GLB, | Performed by: INTERNAL MEDICINE

## 2020-09-25 PROCEDURE — 1125F PR PAIN SEVERITY QUANTIFIED, PAIN PRESENT: ICD-10-PCS | Mod: HCNC,S$GLB,, | Performed by: INTERNAL MEDICINE

## 2020-09-25 PROCEDURE — 80053 COMPREHEN METABOLIC PANEL: CPT | Mod: HCNC

## 2020-09-25 PROCEDURE — 99214 PR OFFICE/OUTPT VISIT, EST, LEVL IV, 30-39 MIN: ICD-10-PCS | Mod: HCNC,S$GLB,, | Performed by: INTERNAL MEDICINE

## 2020-09-25 PROCEDURE — 83520 IMMUNOASSAY QUANT NOS NONAB: CPT | Mod: 59,HCNC

## 2020-09-25 PROCEDURE — 84165 PROTEIN E-PHORESIS SERUM: CPT | Mod: 26,HCNC,, | Performed by: PATHOLOGY

## 2020-09-25 PROCEDURE — 86334 IMMUNOFIX E-PHORESIS SERUM: CPT | Mod: HCNC

## 2020-09-25 NOTE — PROGRESS NOTES
Subjective:   Date of Visit: 9/25/20   ?   ?    REFERRING PROVIDER: No referring provider defined for this encounter.   ?   CHIEF COMPLAINT:  Spontaneous ecchymosis???????   ?     HPI:  74-year-old female with history of brain aneurysm status post 2 brain surgeries in 2003 and 2004, history of provoked proximal lower extremity DVT 1999 treated with short-term anticoagulation, diabetes type 2, hypertension, frequent falls, arthritis, COPD was referred to us for evaluation of spontaneous ecchymosis.    Patient describes what appears to be spontaneous eruption of erythema in both upper and lower extremity that has been going on for over 15 years but seems to have progressively worsened.  She recently had a fall on her right shoulder resulting in hematoma.  Recent ultrasound was negative for DVT in right upper extremity.    She also complains of intermittent epistaxis that lasts for few seconds.  Denies unintentional weight loss, night sweats, nausea or vomiting,  abdominal pain, diarrhea or dysuria.  Denies hemoptysis, hematemesis, hematochezia, melena or hematuria.  She endorses fatigue and shortness of breath especially with minimal exertion. Family history significant for thrombosis.        Review of Systems   Constitutional: Positive for fatigue. Negative for activity change, appetite change, chills, fever and unexpected weight change.   HENT: Negative for hearing loss, mouth sores, nosebleeds, sore throat, tinnitus, trouble swallowing and voice change.    Eyes: Negative for visual disturbance.   Respiratory: Positive for shortness of breath. Negative for cough and chest tightness.    Cardiovascular: Negative for chest pain, palpitations and leg swelling.   Gastrointestinal: Negative for abdominal pain, anal bleeding, blood in stool, constipation, diarrhea, nausea and vomiting.   Genitourinary: Negative for dysuria, frequency, hematuria, pelvic pain, vaginal bleeding and vaginal pain.   Musculoskeletal:  Positive for arthralgias, back pain and gait problem. Negative for joint swelling and neck pain.   Skin: Negative for color change, pallor, rash and wound.   Allergic/Immunologic: Negative for immunocompromised state.   Neurological: Negative for dizziness, tremors, syncope, speech difficulty, weakness, light-headedness and headaches.   Hematological: Negative for adenopathy. Bruises/bleeds easily.   Psychiatric/Behavioral: Negative for agitation, confusion, decreased concentration, hallucinations and sleep disturbance. The patient is not nervous/anxious.        ?   PAST MEDICAL HISTORY:   Past Medical History:   Diagnosis Date    Abnormal ECG 8/5/2019    Aneurysm     Anticoagulant long-term use     Arthritis     CAD in native artery 8/5/2019    COPD (chronic obstructive pulmonary disease)     Diabetes mellitus     Glaucoma     Hypertension     Seizures     Shingles 05/27/2017    Stroke     ?     PAST SURGICAL HISTORY:   Past Surgical History:   Procedure Laterality Date    BRAIN SURGERY      CARDIAC CATHETERIZATION      CORONARY ANGIOPLASTY      EPIDURAL STEROID INJECTION INTO LUMBAR SPINE Bilateral 11/12/2019    Procedure: TF XANDER L4/5;  Surgeon: Desean Dias MD;  Location: [facility];  Service: Pain Management;  Laterality: Bilateral;    HYSTERECTOMY      INJECTION OF ANESTHETIC AGENT AROUND MEDIAL BRANCH NERVES INNERVATING LUMBAR FACET JOINT Bilateral 1/31/2020    Procedure: Bilateral L3-5 MBB;  Surgeon: Desean Dias MD;  Location: [facility];  Service: Pain Management;  Laterality: Bilateral;    INJECTION OF JOINT Bilateral 7/9/2020    Procedure: Bilateral shoulder GH Joint injection with local;  Surgeon: Desean Dias MD;  Location: [facility];  Service: Pain Management;  Laterality: Bilateral;    TRANSFORAMINAL EPIDURAL INJECTION OF STEROID Bilateral 7/23/2019    Procedure: Bilateral L3/4 Transforaminal Epidural Steroid Injection;  Surgeon: Desean Dias MD;   Location: [facility];  Service: Pain Management;  Laterality: Bilateral;    TRANSFORAMINAL EPIDURAL INJECTION OF STEROID Bilateral 3/10/2020    Procedure: Right T12/L1 TF XANDER with local;  Surgeon: Desean Dias MD;  Location: [facility];  Service: Pain Management;  Laterality: Bilateral;    TRANSFORAMINAL EPIDURAL INJECTION OF STEROID Right 2020    Procedure: Right T12/L1 TFESI Covid day of procedure;  Surgeon: Desean Dias MD;  Location: [facility];  Service: Pain Management;  Laterality: Right;      ?   ALLERGIES:   Allergies as of 2020 - Reviewed 2020   Allergen Reaction Noted    Doxycycline Nausea Only 2017    Penicillins Anaphylaxis, Hives, Shortness Of Breath, and Swelling 2012    Oxycodone Other (See Comments) 2019    Penicillin g benzathine Other (See Comments) 2020    Adhesive Rash 2014    Betadine [povidone-iodine] Rash 2017    Sulfa (sulfonamide antibiotics) Itching 2019      ?   MEDICATIONS:?   No outpatient medications have been marked as taking for the 20 encounter (Office Visit) with Parviz Paul MD.      ?   SOCIAL HISTORY:?   Social History     Tobacco Use    Smoking status: Former Smoker     Packs/day: 1.00     Years: 10.00     Pack years: 10.00     Types: Cigarettes     Quit date: 2004     Years since quittin.4    Smokeless tobacco: Never Used   Substance Use Topics    Alcohol use: No        ?   FAMILY HISTORY:   family history includes No Known Problems in her father and mother.   ?     Objective:      Physical Exam  Constitutional:       General: She is not in acute distress.     Appearance: She is well-developed. She is cachectic. She is ill-appearing. She is not toxic-appearing.   HENT:      Head: Normocephalic and atraumatic.      Mouth/Throat:      Pharynx: No oropharyngeal exudate.   Eyes:      General: No scleral icterus.        Right eye: No discharge.         Left eye: No  discharge.      Conjunctiva/sclera: Conjunctivae normal.      Pupils: Pupils are equal, round, and reactive to light.   Neck:      Musculoskeletal: Normal range of motion and neck supple.      Thyroid: No thyromegaly.   Cardiovascular:      Rate and Rhythm: Normal rate and regular rhythm.      Heart sounds: No murmur.   Pulmonary:      Effort: Pulmonary effort is normal. No respiratory distress.      Breath sounds: Normal breath sounds.   Chest:      Chest wall: No tenderness.   Abdominal:      General: Bowel sounds are normal. There is no distension.      Palpations: Abdomen is soft. There is no mass.      Tenderness: There is no abdominal tenderness. There is no guarding or rebound.   Musculoskeletal: Normal range of motion.         General: Swelling (Right shoulder joint, likely hematoma from recent fall) present. No tenderness.   Lymphadenopathy:      Cervical: No cervical adenopathy.      Right cervical: No superficial cervical adenopathy.     Left cervical: No superficial cervical adenopathy.      Upper Body:      Right upper body: No supraclavicular or pectoral adenopathy.      Left upper body: No supraclavicular or pectoral adenopathy.   Skin:     General: Skin is warm and dry.      Capillary Refill: Capillary refill takes 2 to 3 seconds.      Coloration: Skin is not pale.      Findings: Erythema (Diffuse ecchymosis mostly to bilateral upper extremity.) present. No rash.   Neurological:      Mental Status: She is alert and oriented to person, place, and time.      Cranial Nerves: No cranial nerve deficit.      Sensory: No sensory deficit.   Psychiatric:         Behavior: Behavior normal. Behavior is cooperative.         Judgment: Judgment normal.         ?   Vitals:    09/25/20 0831   BP: (!) 144/70   Pulse: 78   Temp: 98 °F (36.7 °C)      ?     ECOG SCORE    2 - Capable of all selfcare but unable to carry out any work activities, active > 50% of hours         ?   Laboratory:  ?   No visits with results  within 1 Day(s) from this visit.   Latest known visit with results is:   Clinical Support on 09/23/2020   Component Date Value Ref Range Status    Interpretation 09/23/2020    Final                    Value:Spirometry is normal. Lung volumes are normal. Lung mechanics are normalÂ  with normal airway resistance and conductanc. Single breath diffusion capacity is normal. This interpretation of diffusing capacity is adjusted for patient's hemoglobin level.Â    Compared to previous test of September 5, 2019: There has been a significant improvement in diffusion capacity. Please correlate clinically      Pre FVC 09/23/2020 2.44  1.91 - 3.45 L Final    Pre FEV1 09/23/2020 2.13  1.46 - 2.65 L Final    Pre FEV1 FVC 09/23/2020 87.07  63.38 - 91.49 % Final    Pre FEF 25 75 09/23/2020 3.44* 0.74 - 2.64 L/s Final    Pre PEF 09/23/2020 7.51* 3.55 - 6.99 L/s Final    Pre  09/23/2020 7.18  sec Final    Pre MVV 09/23/2020 72.00  66.76 - 90.32 L/min Final    Pre DLCO 09/23/2020 14.51* 14.93 - 26.39 ml/(min*mmHg) Final    DLCO ADJ PRE 09/23/2020 15.37  14.93 - 26.39 ml/(min*mmHg) Final    DLCOVA PRE 09/23/2020 4.03  2.74 - 5.58 ml/(min*mmHg*L) Final    DLVAAdj PRE 09/23/2020 4.27  2.74 - 5.58 ml/(min*mmHg*L) Final    VA PRE 09/23/2020 3.60* 4.82 - 4.82 L Final    IVC PRE 09/23/2020 2.34  1.91 - 3.45 L Final    Pre TLC 09/23/2020 4.47  3.98 - 5.96 L Final    VC PRE 09/23/2020 2.44  1.91 - 3.45 L Final    Pre FRC PL 09/23/2020 2.52  L Final    Pre ERV 09/23/2020 0.38  -57288.42 - 91656.58 L Final    Pre RV 09/23/2020 2.03  1.57 - 2.73 L Final    RVTLC PRE 09/23/2020 45.39  34.87 - 54.05 % Final    Raw PRE 09/23/2020 3.20* 3.06 - 3.06 cmH2O*s/L Final    VTGRAWPRE 09/23/2020 2.04  L Final    FVC Ref 09/23/2020 2.68   Final    FVC LLN 09/23/2020 1.91   Final    FVC Pre Ref 09/23/2020 91.0  % Final    FEV1 Ref 09/23/2020 2.06   Final    FEV1 LLN 09/23/2020 1.46   Final    FEV1 Pre Ref 09/23/2020 103.5  %  Final    FEV1 FVC Ref 09/23/2020 77   Final    FEV1 FVC LLN 09/23/2020 63   Final    FEV1 FVC Pre Ref 09/23/2020 112.4  % Final    FEF 25 75 Ref 09/23/2020 1.69   Final    FEF 25 75 LLN 09/23/2020 0.74   Final    FEF 25 75 Pre Ref 09/23/2020 204.1  % Final    PEF Ref 09/23/2020 5.27   Final    PEF LLN 09/23/2020 3.55   Final    PEF Pre Ref 09/23/2020 142.5  % Final    MVV Ref 09/23/2020 79   Final    MVV LLN 09/23/2020 67   Final    MVV Pre Ref 09/23/2020 91.7  % Final    TLC Ref 09/23/2020 4.97   Final    TLC LLN 09/23/2020 3.98   Final    TLC Pre Ref 09/23/2020 90.1  % Final    VC Ref 09/23/2020 2.68   Final    VC LLN 09/23/2020 1.91   Final    VC Pre Ref 09/23/2020 91.0  % Final    FRCpleth Ref 09/23/2020 2.73   Final    FRCpleth LLN 09/23/2020 1.90   Final    FRCpleth PreRef 09/23/2020 92.3  % Final    ERV Ref 09/23/2020 0.58   Final    ERV LLN 09/23/2020 -66877.42   Final    ERV Pre Ref 09/23/2020 65.5  % Final    RV Ref 09/23/2020 2.15   Final    RV LLN 09/23/2020 1.57   Final    RV Pre Ref 09/23/2020 94.5  % Final    RVTLC Ref 09/23/2020 44   Final    RVTLC LLN 09/23/2020 35   Final    RVTLC Pre Ref 09/23/2020 102.1  % Final    Raw Ref 09/23/2020 3.06   Final    Raw LLN 09/23/2020 3.06   Final    Raw Pre Ref 09/23/2020 104.5  % Final    DLCO Single Breath Ref 09/23/2020 20.66   Final    DLCO Single Breath LLN 09/23/2020 14.93   Final    DLCO Single Breath Pre Ref 09/23/2020 70.2  % Final    DLCOc Single Breath Ref 09/23/2020 20.66   Final    DLCOc Single Breath LLN 09/23/2020 14.93   Final    DLCOc Single Breath Pre Ref 09/23/2020 74.4  % Final    DLCOVA Ref 09/23/2020 4.16   Final    DLCOVA LLN 09/23/2020 2.74   Final    DLCOVA Pre Ref 09/23/2020 97.0  % Final    DLCOc SBVA Ref 09/23/2020 4.16   Final    DLCOc SBVA LLN 09/23/2020 2.74   Final    DLCOc SBVA Pre Ref 09/23/2020 102.8  % Final    VA Single Breath Ref 09/23/2020 4.82   Final    VA Single Breath LLN  09/23/2020 4.82   Final    VA Single Breath Pre Ref 09/23/2020 74.7  % Final    IVC Single Breath Ref 09/23/2020 2.68   Final    IVC Single Breath LLN 09/23/2020 1.91   Final    IVC Single Breath Pre Ref 09/23/2020 87.2  % Final      ?   Tumor markers   ?   ?   Imaging: X-Ray Chest PA And Lateral  Narrative: EXAMINATION:  XR CHEST PA AND LATERAL    CLINICAL HISTORY:  Mucopurulent chronic bronchitis    TECHNIQUE:  PA and lateral views of the chest were performed.    COMPARISON:  Chest x-ray 08/20/2020.  Chest CT 08/20/2020.    FINDINGS:  Coarsening of the bronchovascular markings again noted.  No confluent airspace disease.  No pleural effusion.  Cardiomediastinal silhouette and osseous structures are stable in appearance.  Impression: Stable chest    Electronically signed by: Satish Olea MD  Date:    09/23/2020  Time:    09:02     ?      Pathology:  Pathology Results  (Last 10 years)    None           ?   Assessment/Plan:       1. UIP (usual interstitial pneumonitis)    2. Spontaneous ecchymosis          ?UIP (usual interstitial pneumonitis)  Follows with Dr. Singleton. On singular and Spiriva.      Spontaneous ecchymosis  Unknown etiology.  Noted normal CBC with mild anemia.  PT and PTT are within normal ranges.  Reviewed serum electrophoresis and immunofixation from 2019 that showed a prominent IgM Lambda monoclonal band in a oligoclonal background. This pattern can be seen in a wide variety of conditions including but not limited to infections, autoimmune   diseases, and lymphoproliferative processes.    Given continuing spontaneous ecchymosis.  Will order repeat serum electrophoresis, immunofixation and immunoglobulin free light chain assay.  Patient to return back in 2 weeks with repeat labs or sooner if needed.     ? UIP (usual interstitial pneumonitis)  -     CBC auto differential; Future; Expected date: 09/25/2020  -     Comprehensive metabolic panel; Future; Expected date: 09/25/2020    Spontaneous  ecchymosis  -     CBC auto differential; Future; Expected date: 09/25/2020  -     Comprehensive metabolic panel; Future; Expected date: 09/25/2020  -     Protein electrophoresis, serum; Future; Expected date: 09/25/2020  -     YANNICK; Future; Expected date: 09/25/2020  -     Immunoglobulin Free LT Chains Blood; Future; Expected date: 09/25/2020  -     CBC auto differential; Future; Expected date: 09/25/2020  -     Comprehensive metabolic panel; Future; Expected date: 09/25/2020      ?   Follow-Up: Follow up in about 6 weeks (around 11/6/2020).    CECY PARRISH Md., Ph.D  Hematology & Oncology Department  Phone #: [phone]

## 2020-09-25 NOTE — ASSESSMENT & PLAN NOTE
Unknown etiology.  Noted normal CBC with mild anemia.  PT and PTT are within normal ranges.  Reviewed serum electrophoresis and immunofixation from 2019 that showed a prominent IgM Lambda monoclonal band in a oligoclonal background. This pattern can be seen in a wide variety of conditions including but not limited to infections, autoimmune   diseases, and lymphoproliferative processes.    Given continuing spontaneous ecchymosis.  Will order repeat serum electrophoresis, immunofixation and immunoglobulin free light chain assay.  Patient to return back in 2 weeks with repeat labs or sooner if needed.

## 2020-09-28 LAB
ALBUMIN SERPL ELPH-MCNC: 3.91 G/DL (ref 3.35–5.55)
ALPHA1 GLOB SERPL ELPH-MCNC: 0.33 G/DL (ref 0.17–0.41)
ALPHA2 GLOB SERPL ELPH-MCNC: 0.97 G/DL (ref 0.43–0.99)
B-GLOBULIN SERPL ELPH-MCNC: 1.05 G/DL (ref 0.5–1.1)
GAMMA GLOB SERPL ELPH-MCNC: 1.35 G/DL (ref 0.67–1.58)
INTERPRETATION SERPL IFE-IMP: NORMAL
KAPPA LC SER QL IA: 3.53 MG/DL (ref 0.33–1.94)
KAPPA LC/LAMBDA SER IA: 1.27 (ref 0.26–1.65)
LAMBDA LC SER QL IA: 2.78 MG/DL (ref 0.57–2.63)
PATHOLOGIST INTERPRETATION IFE: NORMAL
PATHOLOGIST INTERPRETATION SPE: NORMAL
PROT SERPL-MCNC: 7.6 G/DL (ref 6–8.4)

## 2020-10-26 NOTE — TELEPHONE ENCOUNTER
I spoke with pts brother. He stated pt is having severe swelling and was wondering for she could be placed on a medication to help with this issue

## 2020-10-30 ENCOUNTER — TELEPHONE (OUTPATIENT)
Dept: INTERNAL MEDICINE | Facility: CLINIC | Age: 75
End: 2020-10-30

## 2020-10-30 NOTE — TELEPHONE ENCOUNTER
----- Message from Cherise Estrada sent at 10/30/2020  9:46 AM CDT -----  Regarding: medicaiton  Contact: serjio smith/ brother  Caller is requesting a call back regarding fluid pills.  Please call back at [phone] (gyyy)

## 2020-10-30 NOTE — TELEPHONE ENCOUNTER
S/w pt's brother Rm reporting pt's BLE have been swelling in evening, swelling resolves w/ evelation, denies pitting edema as reviewed, brother reports swelling starting after pt started walking more. Brother denies Wt gain, cough, or SOB. Advised brother would send message to Dr. Chaudhari, and let brother know when we receive Dr's response. Brother fabiola.

## 2020-11-02 NOTE — TELEPHONE ENCOUNTER
Advised pt's brother Rm as below per Dr. Chaudhari. Brother fabiola, confirmed all instrc as given, and to CB PRN.

## 2020-11-05 ENCOUNTER — OFFICE VISIT (OUTPATIENT)
Dept: HEMATOLOGY/ONCOLOGY | Facility: CLINIC | Age: 75
End: 2020-11-05
Payer: MEDICARE

## 2020-11-05 ENCOUNTER — LAB VISIT (OUTPATIENT)
Dept: LAB | Facility: HOSPITAL | Age: 75
End: 2020-11-05
Attending: INTERNAL MEDICINE
Payer: MEDICARE

## 2020-11-05 VITALS
HEIGHT: 64 IN | DIASTOLIC BLOOD PRESSURE: 80 MMHG | OXYGEN SATURATION: 98 % | WEIGHT: 138.25 LBS | BODY MASS INDEX: 23.6 KG/M2 | SYSTOLIC BLOOD PRESSURE: 149 MMHG | TEMPERATURE: 98 F | RESPIRATION RATE: 16 BRPM | HEART RATE: 80 BPM

## 2020-11-05 DIAGNOSIS — R23.3 SPONTANEOUS ECCHYMOSIS: ICD-10-CM

## 2020-11-05 DIAGNOSIS — I15.2 HYPERTENSION ASSOCIATED WITH DIABETES: ICD-10-CM

## 2020-11-05 DIAGNOSIS — E11.59 HYPERTENSION ASSOCIATED WITH DIABETES: ICD-10-CM

## 2020-11-05 DIAGNOSIS — D64.9 ANEMIA, UNSPECIFIED TYPE: Primary | ICD-10-CM

## 2020-11-05 DIAGNOSIS — J84.112 UIP (USUAL INTERSTITIAL PNEUMONITIS): ICD-10-CM

## 2020-11-05 LAB
ALBUMIN SERPL BCP-MCNC: 3.5 G/DL (ref 3.5–5.2)
ALP SERPL-CCNC: 50 U/L (ref 55–135)
ALT SERPL W/O P-5'-P-CCNC: 16 U/L (ref 10–44)
ANION GAP SERPL CALC-SCNC: 10 MMOL/L (ref 8–16)
AST SERPL-CCNC: 21 U/L (ref 10–40)
BASOPHILS # BLD AUTO: 0.04 K/UL (ref 0–0.2)
BASOPHILS NFR BLD: 0.7 % (ref 0–1.9)
BILIRUB SERPL-MCNC: 0.3 MG/DL (ref 0.1–1)
BUN SERPL-MCNC: 19 MG/DL (ref 8–23)
CALCIUM SERPL-MCNC: 9.2 MG/DL (ref 8.7–10.5)
CHLORIDE SERPL-SCNC: 104 MMOL/L (ref 95–110)
CO2 SERPL-SCNC: 25 MMOL/L (ref 23–29)
CREAT SERPL-MCNC: 0.9 MG/DL (ref 0.5–1.4)
DIFFERENTIAL METHOD: ABNORMAL
EOSINOPHIL # BLD AUTO: 0.2 K/UL (ref 0–0.5)
EOSINOPHIL NFR BLD: 3.8 % (ref 0–8)
ERYTHROCYTE [DISTWIDTH] IN BLOOD BY AUTOMATED COUNT: 16.6 % (ref 11.5–14.5)
EST. GFR  (AFRICAN AMERICAN): >60 ML/MIN/1.73 M^2
EST. GFR  (NON AFRICAN AMERICAN): >60 ML/MIN/1.73 M^2
GLUCOSE SERPL-MCNC: 76 MG/DL (ref 70–110)
HCT VFR BLD AUTO: 41.4 % (ref 37–48.5)
HGB BLD-MCNC: 12.6 G/DL (ref 12–16)
IMM GRANULOCYTES # BLD AUTO: 0.02 K/UL (ref 0–0.04)
IMM GRANULOCYTES NFR BLD AUTO: 0.3 % (ref 0–0.5)
LYMPHOCYTES # BLD AUTO: 2 K/UL (ref 1–4.8)
LYMPHOCYTES NFR BLD: 32 % (ref 18–48)
MCH RBC QN AUTO: 28.4 PG (ref 27–31)
MCHC RBC AUTO-ENTMCNC: 30.4 G/DL (ref 32–36)
MCV RBC AUTO: 93 FL (ref 82–98)
MONOCYTES # BLD AUTO: 0.6 K/UL (ref 0.3–1)
MONOCYTES NFR BLD: 9.5 % (ref 4–15)
NEUTROPHILS # BLD AUTO: 3.3 K/UL (ref 1.8–7.7)
NEUTROPHILS NFR BLD: 53.7 % (ref 38–73)
NRBC BLD-RTO: 0 /100 WBC
PLATELET # BLD AUTO: 215 K/UL (ref 150–350)
PMV BLD AUTO: 9.2 FL (ref 9.2–12.9)
POTASSIUM SERPL-SCNC: 3.9 MMOL/L (ref 3.5–5.1)
PROT SERPL-MCNC: 7.9 G/DL (ref 6–8.4)
RBC # BLD AUTO: 4.44 M/UL (ref 4–5.4)
SODIUM SERPL-SCNC: 139 MMOL/L (ref 136–145)
WBC # BLD AUTO: 6.09 K/UL (ref 3.9–12.7)

## 2020-11-05 PROCEDURE — 36415 COLL VENOUS BLD VENIPUNCTURE: CPT | Mod: HCNC

## 2020-11-05 PROCEDURE — 1101F PT FALLS ASSESS-DOCD LE1/YR: CPT | Mod: HCNC,CPTII,S$GLB, | Performed by: INTERNAL MEDICINE

## 2020-11-05 PROCEDURE — 1126F PR PAIN SEVERITY QUANTIFIED, NO PAIN PRESENT: ICD-10-PCS | Mod: HCNC,S$GLB,, | Performed by: INTERNAL MEDICINE

## 2020-11-05 PROCEDURE — 1159F PR MEDICATION LIST DOCUMENTED IN MEDICAL RECORD: ICD-10-PCS | Mod: HCNC,S$GLB,, | Performed by: INTERNAL MEDICINE

## 2020-11-05 PROCEDURE — 1159F MED LIST DOCD IN RCRD: CPT | Mod: HCNC,S$GLB,, | Performed by: INTERNAL MEDICINE

## 2020-11-05 PROCEDURE — 99214 OFFICE O/P EST MOD 30 MIN: CPT | Mod: HCNC,S$GLB,, | Performed by: INTERNAL MEDICINE

## 2020-11-05 PROCEDURE — 3077F PR MOST RECENT SYSTOLIC BLOOD PRESSURE >= 140 MM HG: ICD-10-PCS | Mod: HCNC,CPTII,S$GLB, | Performed by: INTERNAL MEDICINE

## 2020-11-05 PROCEDURE — 3079F PR MOST RECENT DIASTOLIC BLOOD PRESSURE 80-89 MM HG: ICD-10-PCS | Mod: HCNC,CPTII,S$GLB, | Performed by: INTERNAL MEDICINE

## 2020-11-05 PROCEDURE — 85025 COMPLETE CBC W/AUTO DIFF WBC: CPT | Mod: HCNC

## 2020-11-05 PROCEDURE — 99214 PR OFFICE/OUTPT VISIT, EST, LEVL IV, 30-39 MIN: ICD-10-PCS | Mod: HCNC,S$GLB,, | Performed by: INTERNAL MEDICINE

## 2020-11-05 PROCEDURE — 3077F SYST BP >= 140 MM HG: CPT | Mod: HCNC,CPTII,S$GLB, | Performed by: INTERNAL MEDICINE

## 2020-11-05 PROCEDURE — 1126F AMNT PAIN NOTED NONE PRSNT: CPT | Mod: HCNC,S$GLB,, | Performed by: INTERNAL MEDICINE

## 2020-11-05 PROCEDURE — 99999 PR PBB SHADOW E&M-EST. PATIENT-LVL V: ICD-10-PCS | Mod: PBBFAC,HCNC,, | Performed by: INTERNAL MEDICINE

## 2020-11-05 PROCEDURE — 80053 COMPREHEN METABOLIC PANEL: CPT | Mod: HCNC

## 2020-11-05 PROCEDURE — 1101F PR PT FALLS ASSESS DOC 0-1 FALLS W/OUT INJ PAST YR: ICD-10-PCS | Mod: HCNC,CPTII,S$GLB, | Performed by: INTERNAL MEDICINE

## 2020-11-05 PROCEDURE — 3079F DIAST BP 80-89 MM HG: CPT | Mod: HCNC,CPTII,S$GLB, | Performed by: INTERNAL MEDICINE

## 2020-11-05 PROCEDURE — 3044F HG A1C LEVEL LT 7.0%: CPT | Mod: HCNC,CPTII,S$GLB, | Performed by: INTERNAL MEDICINE

## 2020-11-05 PROCEDURE — 99999 PR PBB SHADOW E&M-EST. PATIENT-LVL V: CPT | Mod: PBBFAC,HCNC,, | Performed by: INTERNAL MEDICINE

## 2020-11-05 PROCEDURE — 3044F PR MOST RECENT HEMOGLOBIN A1C LEVEL <7.0%: ICD-10-PCS | Mod: HCNC,CPTII,S$GLB, | Performed by: INTERNAL MEDICINE

## 2020-11-05 NOTE — PROGRESS NOTES
Subjective:   Date of Visit: 11/5/20   ?   ?    REFERRING PROVIDER: No referring provider defined for this encounter.   ?   CHIEF COMPLAINT:  Spontaneous ecchymosis???????   ?     HPI:  74-year-old female with history of brain aneurysm status post 2 brain surgeries in 2003 and 2004, history of provoked proximal lower extremity DVT 1999 treated with short-term anticoagulation, diabetes type 2, hypertension, frequent falls, arthritis, COPD was referred to us for evaluation of spontaneous ecchymosis.    Patient describes what appears to be spontaneous eruption of erythema in both upper and lower extremity that has been going on for over 15 years but seems to have progressively worsened.  She recently had a fall on her right shoulder resulting in hematoma.  Recent ultrasound was negative for DVT in right upper extremity. She also complains of intermittent epistaxis that lasts for few seconds.  Denies unintentional weight loss, night sweats, nausea or vomiting,  abdominal pain, diarrhea or dysuria.  Denies hemoptysis, hematemesis, hematochezia, melena or hematuria.  She endorses fatigue and shortness of breath especially with minimal exertion. Family history significant for thrombosis.    Interval history:  Initial workup was unremarkable including coagulation panel-PT, PTT and INR.  Also serum electrophoresis and immunofixation revealed no monoclonal protein.  She presents today for routine follow-up.  Continues to complain of spontaneous ecchymosis and bleeding.  Denies any trauma.  No other complaints.        Review of Systems   Constitutional: Positive for fatigue. Negative for activity change, appetite change, chills, fever and unexpected weight change.   HENT: Negative for hearing loss, mouth sores, nosebleeds, sore throat, tinnitus, trouble swallowing and voice change.    Eyes: Negative for visual disturbance.   Respiratory: Negative for cough and chest tightness.    Cardiovascular: Negative for chest pain,  palpitations and leg swelling.   Gastrointestinal: Negative for abdominal pain, anal bleeding, blood in stool, constipation, diarrhea, nausea and vomiting.   Genitourinary: Negative for dysuria, frequency, hematuria, pelvic pain, vaginal bleeding and vaginal pain.   Musculoskeletal: Positive for arthralgias, back pain and gait problem. Negative for joint swelling and neck pain.   Skin: Negative for color change, pallor, rash and wound.   Allergic/Immunologic: Negative for immunocompromised state.   Neurological: Negative for dizziness, tremors, syncope, speech difficulty, weakness, light-headedness and headaches.   Hematological: Negative for adenopathy. Bruises/bleeds easily.   Psychiatric/Behavioral: Negative for agitation, confusion, decreased concentration, hallucinations and sleep disturbance. The patient is not nervous/anxious.        ?   PAST MEDICAL HISTORY:   Past Medical History:   Diagnosis Date    Abnormal ECG 8/5/2019    Aneurysm     Anticoagulant long-term use     Arthritis     CAD in native artery 8/5/2019    COPD (chronic obstructive pulmonary disease)     Diabetes mellitus     Glaucoma     Hypertension     Seizures     Shingles 05/27/2017    Stroke     ?     PAST SURGICAL HISTORY:   Past Surgical History:   Procedure Laterality Date    BRAIN SURGERY      CARDIAC CATHETERIZATION      CORONARY ANGIOPLASTY      EPIDURAL STEROID INJECTION INTO LUMBAR SPINE Bilateral 11/12/2019    Procedure: TF XANDER L4/5;  Surgeon: Desean Dias MD;  Location: [facility]IN [facility]GT;  Service: Pain Management;  Laterality: Bilateral;    HYSTERECTOMY      INJECTION OF ANESTHETIC AGENT AROUND MEDIAL BRANCH NERVES INNERVATING LUMBAR FACET JOINT Bilateral 1/31/2020    Procedure: Bilateral L3-5 MBB;  Surgeon: Desean Dias MD;  Location: [facility]IN [facility]GT;  Service: Pain Management;  Laterality: Bilateral;    INJECTION OF JOINT Bilateral 7/9/2020    Procedure: Bilateral shoulder GH Joint injection with local;   Surgeon: Desean Dias MD;  Location: [facility] PAIN [facility]GT;  Service: Pain Management;  Laterality: Bilateral;    TRANSFORAMINAL EPIDURAL INJECTION OF STEROID Bilateral 7/23/2019    Procedure: Bilateral L3/4 Transforaminal Epidural Steroid Injection;  Surgeon: Desean Dias MD;  Location: [facility] PAIN [facility]GT;  Service: Pain Management;  Laterality: Bilateral;    TRANSFORAMINAL EPIDURAL INJECTION OF STEROID Bilateral 3/10/2020    Procedure: Right T12/L1 TF XANDER with local;  Surgeon: Desean Dias MD;  Location: [facility] PAIN [facility]GT;  Service: Pain Management;  Laterality: Bilateral;    TRANSFORAMINAL EPIDURAL INJECTION OF STEROID Right 6/19/2020    Procedure: Right T12/L1 TFESI Covid day of procedure;  Surgeon: Desean Dias MD;  Location: [facility] PAIN [facility]GT;  Service: Pain Management;  Laterality: Right;      ?   ALLERGIES:   Allergies as of 11/05/2020 - Reviewed 11/05/2020   Allergen Reaction Noted    Doxycycline Nausea Only 04/26/2017    Penicillins Anaphylaxis, Hives, Shortness Of Breath, and Swelling 06/29/2012    Oxycodone Other (See Comments) 04/11/2019    Penicillin g benzathine Other (See Comments) 02/11/2020    Adhesive Rash 08/26/2014    Betadine [povidone-iodine] Rash 02/14/2017    Sulfa (sulfonamide antibiotics) Itching 04/11/2019      ?   MEDICATIONS:?   Outpatient Medications Marked as Taking for the 11/5/20 encounter (Office Visit) with Parviz Paul MD   Medication Sig Dispense Refill    albuterol (PROVENTIL/VENTOLIN HFA) 90 mcg/actuation inhaler Inhale 2 puffs into the lungs every 4 (four) hours as needed. 18 g 11    albuterol-ipratropium (DUO-NEB) 2.5 mg-0.5 mg/3 mL nebulizer solution Take 3 mLs by nebulization every 6 (six) hours as needed for Wheezing. Rescue 1 Box 11    amLODIPine (NORVASC) 10 MG tablet Take 1 tablet (10 mg total) by mouth once daily. 30 tablet 3    APTIOM 600 mg Tab TAKE 1 TABLET BY MOUTH EVERY EVENING 30 tablet 11    atorvastatin (LIPITOR) 40 MG tablet Take 1 tablet (40  mg total) by mouth once daily. 90 tablet 3    cholecalciferol, vitamin D3, 1,250 mcg (50,000 unit) capsule Take 1 capsule (50,000 Units total) by mouth every 7 days. 12 capsule 0    clopidogreL (PLAVIX) 75 mg tablet Take 1 tablet (75 mg total) by mouth once daily. 30 tablet 6    cyclobenzaprine (FLEXERIL) 5 MG tablet TAKE 1 TABLET BY MOUTH THREE TIMES DAILY AS NEEDED FOR MUSCLE SPASMS 30 tablet 1    denosumab (PROLIA) 60 mg/mL Syrg every 6 (six) months.      dexlansoprazole (DEXILANT) 60 mg capsule Take 1 capsule (60 mg total) by mouth once daily. 90 capsule 3    diltiaZEM (CARDIZEM LA) 120 mg 24 hr tablet Take 1 tablet (120 mg total) by mouth once daily. 30 tablet 3    escitalopram oxalate (LEXAPRO) 20 MG tablet Take 1 tablet (20 mg total) by mouth once daily. 30 tablet 0    fluticasone furoate-vilanterol (BREO ELLIPTA) 200-25 mcg/dose DsDv diskus inhaler Inhale 1 puff into the lungs every evening. 60 each 11    fluticasone propionate (FLONASE) 50 mcg/actuation nasal spray 1 spray (50 mcg total) by Each Nostril route once daily. 16 g 11    gemfibrozil (LOPID) 600 MG tablet Take 1 tablet (600 mg total) by mouth once daily. 30 tablet 0    guaifenesin 100 mg/5 ml (ROBITUSSIN) 100 mg/5 mL syrup Take 200 mg by mouth as needed for Cough.      Lactobacillus acidophilus (PROBIOTIC ACIDOPHILUS ORAL) Take 1 capsule by mouth 2 (two) times daily.      loratadine (CLARITIN) 10 mg tablet Take 1 tablet by mouth once daily.      MELATONIN ORAL Take by mouth nightly as needed.      meloxicam (MOBIC) 7.5 MG tablet Take 1 tablet (7.5 mg total) by mouth once daily. 14 tablet 0    metFORMIN (GLUCOPHAGE) 500 MG tablet TAKE 1 TABLET BY MOUTH WITH MEALS ONCE DAILY 90 tablet 1    metoprolol tartrate (LOPRESSOR) 25 MG tablet Take 1 tablet (25 mg total) by mouth 2 (two) times daily. TAKE 1 TABLET BY MOUTH TWICE DAILY WITH FOOD FOR HEART AND BLOOD PRESSURE 180 tablet 3    montelukast (SINGULAIR) 10 mg tablet Take 1 tablet  (10 mg total) by mouth once daily. 90 tablet 3    neomycin-polymyxin-hydrocortisone (CORTISPORIN) 3.5-10,000-1 mg/mL-unit/mL-% otic suspension Place 4 drops into the right ear 4 (four) times daily. 10 mL 0    ranolazine (RANEXA) 1,000 mg Tb12 TAKE 1 TABLET BY MOUTH TWICE DAILY 60 tablet 6    sennosides (SENNA) 8.6 mg Cap Take by mouth as needed.      tolterodine (DETROL LA) 4 MG 24 hr capsule Take 1 capsule (4 mg total) by mouth once daily. 90 capsule 3    traMADoL (ULTRAM) 50 mg tablet Take 1 tablet (50 mg total) by mouth every 6 (six) hours as needed. 12 tablet 0    valsartan (DIOVAN) 320 MG tablet Take 1 tablet (320 mg total) by mouth once daily. 30 tablet 3    venlafaxine 150 mg TR24 Take 150 mg by mouth once daily.        ?   SOCIAL HISTORY:?   Social History     Tobacco Use    Smoking status: Former Smoker     Packs/day: 1.00     Years: 10.00     Pack years: 10.00     Types: Cigarettes     Quit date: 2004     Years since quittin.5    Smokeless tobacco: Never Used   Substance Use Topics    Alcohol use: No        ?   FAMILY HISTORY:   family history includes No Known Problems in her father and mother.   ?     Objective:      Physical Exam  Constitutional:       General: She is not in acute distress.     Appearance: She is well-developed. She is not toxic-appearing.   HENT:      Head: Normocephalic and atraumatic.      Mouth/Throat:      Pharynx: No oropharyngeal exudate.   Eyes:      General: No scleral icterus.        Right eye: No discharge.         Left eye: No discharge.      Conjunctiva/sclera: Conjunctivae normal.      Pupils: Pupils are equal, round, and reactive to light.   Neck:      Musculoskeletal: Normal range of motion and neck supple.      Thyroid: No thyromegaly.   Cardiovascular:      Rate and Rhythm: Normal rate and regular rhythm.      Heart sounds: No murmur.   Pulmonary:      Effort: Pulmonary effort is normal. No respiratory distress.      Breath sounds: Normal breath  sounds.   Chest:      Chest wall: No tenderness.   Abdominal:      General: Bowel sounds are normal. There is no distension.      Palpations: Abdomen is soft. There is no mass.      Tenderness: There is no abdominal tenderness. There is no guarding or rebound.   Musculoskeletal: Normal range of motion.         General: No tenderness.   Lymphadenopathy:      Cervical: No cervical adenopathy.      Right cervical: No superficial cervical adenopathy.     Left cervical: No superficial cervical adenopathy.      Upper Body:      Right upper body: No supraclavicular or pectoral adenopathy.      Left upper body: No supraclavicular or pectoral adenopathy.   Skin:     General: Skin is warm and dry.      Capillary Refill: Capillary refill takes 2 to 3 seconds.      Coloration: Skin is not pale.      Findings: Erythema (Diffuse ecchymosis mostly to bilateral upper extremity.) present. No rash.   Neurological:      Mental Status: She is alert and oriented to person, place, and time.      Cranial Nerves: No cranial nerve deficit.      Sensory: No sensory deficit.   Psychiatric:         Behavior: Behavior normal. Behavior is cooperative.         Judgment: Judgment normal.         ?   Vitals:    11/05/20 1127   BP: (!) 149/80   Pulse: 80   Resp: 16   Temp: 97.6 °F (36.4 °C)      ?     ECOG SCORE           ?   Laboratory:  ?   Lab Visit on 11/05/2020   Component Date Value Ref Range Status    WBC 11/05/2020 6.09  3.90 - 12.70 K/uL Final    RBC 11/05/2020 4.44  4.00 - 5.40 M/uL Final    Hemoglobin 11/05/2020 12.6  12.0 - 16.0 g/dL Final    Hematocrit 11/05/2020 41.4  37.0 - 48.5 % Final    MCV 11/05/2020 93  82 - 98 fL Final    MCH 11/05/2020 28.4  27.0 - 31.0 pg Final    MCHC 11/05/2020 30.4* 32.0 - 36.0 g/dL Final    RDW 11/05/2020 16.6* 11.5 - 14.5 % Final    Platelets 11/05/2020 215  150 - 350 K/uL Final    MPV 11/05/2020 9.2  9.2 - 12.9 fL Final    Immature Granulocytes 11/05/2020 0.3  0.0 - 0.5 % Final    Gran #  (ANC) 11/05/2020 3.3  1.8 - 7.7 K/uL Final    Immature Grans (Abs) 11/05/2020 0.02  0.00 - 0.04 K/uL Final    Lymph # 11/05/2020 2.0  1.0 - 4.8 K/uL Final    Mono # 11/05/2020 0.6  0.3 - 1.0 K/uL Final    Eos # 11/05/2020 0.2  0.0 - 0.5 K/uL Final    Baso # 11/05/2020 0.04  0.00 - 0.20 K/uL Final    nRBC 11/05/2020 0  0 /100 WBC Final    Gran % 11/05/2020 53.7  38.0 - 73.0 % Final    Lymph % 11/05/2020 32.0  18.0 - 48.0 % Final    Mono % 11/05/2020 9.5  4.0 - 15.0 % Final    Eosinophil % 11/05/2020 3.8  0.0 - 8.0 % Final    Basophil % 11/05/2020 0.7  0.0 - 1.9 % Final    Differential Method 11/05/2020 Automated   Final    Sodium 11/05/2020 139  136 - 145 mmol/L Final    Potassium 11/05/2020 3.9  3.5 - 5.1 mmol/L Final    Chloride 11/05/2020 104  95 - 110 mmol/L Final    CO2 11/05/2020 25  23 - 29 mmol/L Final    Glucose 11/05/2020 76  70 - 110 mg/dL Final    BUN 11/05/2020 19  8 - 23 mg/dL Final    Creatinine 11/05/2020 0.9  0.5 - 1.4 mg/dL Final    Calcium 11/05/2020 9.2  8.7 - 10.5 mg/dL Final    Total Protein 11/05/2020 7.9  6.0 - 8.4 g/dL Final    Albumin 11/05/2020 3.5  3.5 - 5.2 g/dL Final    Total Bilirubin 11/05/2020 0.3  0.1 - 1.0 mg/dL Final    Alkaline Phosphatase 11/05/2020 50* 55 - 135 U/L Final    AST 11/05/2020 21  10 - 40 U/L Final    ALT 11/05/2020 16  10 - 44 U/L Final    Anion Gap 11/05/2020 10  8 - 16 mmol/L Final    eGFR if African American 11/05/2020 >60  >60 mL/min/1.73 m^2 Final    eGFR if non African American 11/05/2020 >60  >60 mL/min/1.73 m^2 Final      ?   Tumor markers   ?   ?   Imaging: X-Ray Chest PA And Lateral  Narrative: EXAMINATION:  XR CHEST PA AND LATERAL    CLINICAL HISTORY:  Mucopurulent chronic bronchitis    TECHNIQUE:  PA and lateral views of the chest were performed.    COMPARISON:  Chest x-ray 08/20/2020.  Chest CT 08/20/2020.    FINDINGS:  Coarsening of the bronchovascular markings again noted.  No confluent airspace disease.  No pleural  effusion.  Cardiomediastinal silhouette and osseous structures are stable in appearance.  Impression: Stable chest    Electronically signed by: Satish Olea MD  Date:    09/23/2020  Time:    09:02     ?      Pathology:  Pathology Results  (Last 10 years)    None           ?   Assessment/Plan:       1. Anemia, unspecified type    2. Spontaneous ecchymosis    3. Hypertension associated with diabetes          ?Spontaneous ecchymosis  Unknown etiology at this time.  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    Noted normal PT and PTT.  Will initiate workup to rule out factor deficiencies and acquired bleeding disorder.     ? Anemia, unspecified type  -     Factor 8 Assay; Future; Expected date: 11/05/2020  -     von Willebrand Profile; Future; Expected date: 11/05/2020  -     FACTOR 7 ASSAY; Future; Expected date: 11/05/2020  -     FACTOR 13 ACTIVITY; Future; Expected date: 11/05/2020  -     CBC Auto Differential; Future; Expected date: 11/05/2020  -     Comprehensive Metabolic Panel; Future; Expected date: 11/05/2020  -     Protime-INR; Future; Expected date: 11/05/2020  -     APTT; Future; Expected date: 11/05/2020  -     CBC Auto Differential; Future; Expected date: 11/05/2020  -     Comprehensive Metabolic Panel; Future; Expected date: 11/05/2020    Spontaneous ecchymosis  -     Factor 8 Assay; Future; Expected date: 11/05/2020  -     von Willebrand Profile; Future; Expected date: 11/05/2020  -     FACTOR 7 ASSAY; Future; Expected date: 11/05/2020  -     FACTOR 13 ACTIVITY; Future; Expected date: 11/05/2020  -     CBC Auto Differential; Future; Expected date: 11/05/2020  -     Comprehensive Metabolic Panel; Future; Expected date: 11/05/2020  -     Protime-INR; Future; Expected date: 11/05/2020  -     APTT; Future; Expected date: 11/05/2020  -     CBC Auto  Differential; Future; Expected date: 11/05/2020  -     Comprehensive Metabolic Panel; Future; Expected date: 11/05/2020    Hypertension associated with diabetes  -     Factor 8 Assay; Future; Expected date: 11/05/2020  -     von Willebrand Profile; Future; Expected date: 11/05/2020  -     FACTOR 7 ASSAY; Future; Expected date: 11/05/2020  -     FACTOR 13 ACTIVITY; Future; Expected date: 11/05/2020  -     CBC Auto Differential; Future; Expected date: 11/05/2020  -     Comprehensive Metabolic Panel; Future; Expected date: 11/05/2020  -     Protime-INR; Future; Expected date: 11/05/2020  -     APTT; Future; Expected date: 11/05/2020  -     CBC Auto Differential; Future; Expected date: 11/05/2020  -     Comprehensive Metabolic Panel; Future; Expected date: 11/05/2020      ?   Follow-Up: Follow up in about 4 weeks (around 12/3/2020).    CECY PARRISH Md., Ph.D  Hematology & Oncology Department  Phone #: [phone]

## 2020-11-05 NOTE — ASSESSMENT & PLAN NOTE
Unknown etiology at this time.  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    Noted normal PT and PTT.  Will initiate workup to rule out factor deficiencies and acquired bleeding disorder.

## 2020-11-23 ENCOUNTER — HOSPITAL ENCOUNTER (EMERGENCY)
Facility: HOSPITAL | Age: 75
Discharge: HOME | End: 2020-11-23
Attending: EMERGENCY MEDICINE
Payer: MEDICARE

## 2020-11-23 VITALS
DIASTOLIC BLOOD PRESSURE: 63 MMHG | BODY MASS INDEX: 23.73 KG/M2 | RESPIRATION RATE: 18 BRPM | OXYGEN SATURATION: 99 % | SYSTOLIC BLOOD PRESSURE: 136 MMHG | HEIGHT: 64 IN | TEMPERATURE: 98 F | HEART RATE: 91 BPM

## 2020-11-23 DIAGNOSIS — S51.012A SKIN TEAR OF LEFT ELBOW WITHOUT COMPLICATION, INITIAL ENCOUNTER: Primary | ICD-10-CM

## 2020-11-23 DIAGNOSIS — S59.902A ELBOW INJURY, LEFT, INITIAL ENCOUNTER: ICD-10-CM

## 2020-11-23 PROCEDURE — 25000003 PHARM REV CODE 250: Mod: HCNC | Performed by: NURSE PRACTITIONER

## 2020-11-23 PROCEDURE — 99283 EMERGENCY DEPT VISIT LOW MDM: CPT | Mod: 25,HCNC

## 2020-11-23 PROCEDURE — 12002 RPR S/N/AX/GEN/TRNK2.6-7.5CM: CPT | Mod: HCNC

## 2020-11-23 RX ADMIN — Medication 1 ML: at 07:11

## 2020-11-24 NOTE — ED PROVIDER NOTES
"     HISTORY     Chief Complaint   Patient presents with    Laceration     Left arm laceration from door     Review of patient's allergies indicates:   Allergen Reactions    Doxycycline Nausea Only    Penicillins Anaphylaxis, Hives, Shortness Of Breath and Swelling    Oxycodone Other (See Comments)     Fatigue      Penicillin g benzathine Other (See Comments)    Adhesive Rash    Betadine [povidone-iodine] Rash     Pt confirms has completed CT w/ IV contrast-iodine without reaction or need for pre-medication.    Sulfa (sulfonamide antibiotics) Itching        HPI   74 year old female presents to the ER for left elbow skin tear from hitting door accidentally 1 hr ago. Patient states she takes plavix so she bleeds easy and skin is "so thin". Patient states pain is 3/10 and worsens with movement or palpation. Patient  Is UTD on tetanus. Patient denies any other complaints.            PCP: POOL Chaudhari Jr, MD     Past Medical History:  Past Medical History:   Diagnosis Date    Abnormal ECG 8/5/2019    Aneurysm     Anticoagulant long-term use     Arthritis     CAD in native artery 8/5/2019    COPD (chronic obstructive pulmonary disease)     Diabetes mellitus     Glaucoma     Hypertension     Seizures     Shingles 05/27/2017    Stroke         Past Surgical History:  Past Surgical History:   Procedure Laterality Date    BRAIN SURGERY      CARDIAC CATHETERIZATION      CORONARY ANGIOPLASTY      EPIDURAL STEROID INJECTION INTO LUMBAR SPINE Bilateral 11/12/2019    Procedure: TF XANDER L4/5;  Surgeon: Desean Dias MD;  Location: [facility];  Service: Pain Management;  Laterality: Bilateral;    HYSTERECTOMY      INJECTION OF ANESTHETIC AGENT AROUND MEDIAL BRANCH NERVES INNERVATING LUMBAR FACET JOINT Bilateral 1/31/2020    Procedure: Bilateral L3-5 MBB;  Surgeon: Desean Dias MD;  Location: [facility];  Service: Pain Management;  Laterality: Bilateral;    INJECTION OF JOINT Bilateral 7/9/2020 "    Procedure: Bilateral shoulder GH Joint injection with local;  Surgeon: Desean Dias MD;  Location: [facility];  Service: Pain Management;  Laterality: Bilateral;    TRANSFORAMINAL EPIDURAL INJECTION OF STEROID Bilateral 2019    Procedure: Bilateral L3/4 Transforaminal Epidural Steroid Injection;  Surgeon: Desean Dias MD;  Location: [facility];  Service: Pain Management;  Laterality: Bilateral;    TRANSFORAMINAL EPIDURAL INJECTION OF STEROID Bilateral 3/10/2020    Procedure: Right T12/L1 TF XANDER with local;  Surgeon: Desean Dias MD;  Location: [facility];  Service: Pain Management;  Laterality: Bilateral;    TRANSFORAMINAL EPIDURAL INJECTION OF STEROID Right 2020    Procedure: Right T12/L1 TFESI Covid day of procedure;  Surgeon: Desean Dias MD;  Location: [facility];  Service: Pain Management;  Laterality: Right;        Family History:  Family History   Problem Relation Age of Onset    No Known Problems Mother     No Known Problems Father         Social History:  Social History     Tobacco Use    Smoking status: Former Smoker     Packs/day: 1.00     Years: 10.00     Pack years: 10.00     Types: Cigarettes     Quit date: 2004     Years since quittin.6    Smokeless tobacco: Never Used   Substance and Sexual Activity    Alcohol use: No    Drug use: Never    Sexual activity: Not Currently         ROS   Review of Systems   Constitutional: Negative for fever.   HENT: Negative for sore throat.    Respiratory: Negative for shortness of breath.    Cardiovascular: Negative for chest pain.   Gastrointestinal: Negative for nausea.   Genitourinary: Negative for dysuria.   Musculoskeletal: Negative for back pain.   Skin: Negative for rash.        Skin tear left elbow    Neurological: Negative for weakness.   Hematological: Does not bruise/bleed easily.       PHYSICAL EXAM     Initial Vitals [20 1735]   BP Pulse Resp Temp SpO2   136/63 91 18 98.3 °F (36.8 °C) 99 %       MAP       --           Physical Exam    Constitutional: She appears well-developed and well-nourished. No distress.   HENT:   Head: Normocephalic and atraumatic.   Eyes: Conjunctivae are normal. Pupils are equal, round, and reactive to light.   Neck: Normal range of motion. Neck supple.   Cardiovascular: Normal rate, regular rhythm and normal heart sounds.   Pulmonary/Chest: Breath sounds normal.   Abdominal: Soft. Bowel sounds are normal. She exhibits no distension. There is no abdominal tenderness. There is no rebound.   Musculoskeletal: Normal range of motion. No edema.   Neurological: She is alert and oriented to person, place, and time. She has normal strength.   Skin: Skin is warm and dry.        Psychiatric: She has a normal mood and affect.          ED COURSE   Lac Repair    Date/Time: 11/24/2020 8:54 PM  Performed by: Александр Barriga NP  Authorized by: Teri Wilkins Do, MD     Anesthesia (see MAR for exact dosages):     Anesthesia method:  Topical application    Topical anesthetic:  LET  Laceration details:     Location:  Shoulder/arm    Shoulder/arm location:  L elbow    Length (cm):  3.5    Depth (mm):  0.1  Repair type:     Repair type:  Simple  Pre-procedure details:     Preparation:  Patient was prepped and draped in usual sterile fashion  Exploration:     Hemostasis achieved with:  LET    Wound exploration: wound explored through full range of motion and entire depth of wound probed and visualized      Contaminated: no    Treatment:     Area cleansed with:  Hibiclens    Amount of cleaning:  Standard    Irrigation method:  Syringe  Skin repair:     Repair method:  Tissue adhesive  Approximation:     Approximation:  Close  Post-procedure details:     Dressing:  Sterile dressing    Patient tolerance of procedure:  Tolerated well, no immediate complications      ED ONGOING VITALS:  Vitals:    11/23/20 1735   BP: 136/63   Pulse: 91   Resp: 18   Temp: 98.3 °F (36.8 °C)   TempSrc: Oral   SpO2: 99%  "  Height: 5' 4" (1.626 m)         ABNORMAL LAB VALUES:  Labs Reviewed - No data to display      ALL LAB VALUES:      RADIOLOGY STUDIES:  Imaging Results    None                   The above vital signs and test results have been reviewed by the emergency provider.     ED Medications:  Discharge Medication List as of 11/23/2020  8:03 PM        Discharge Medications:  Discharge Medication List as of 11/23/2020  8:03 PM         Follow-up Information     E Brody Chaudhari Jr, MD.    Specialties: Internal Medicine, Pediatrics  Contact information:  [id number]  [place] [address] [phone]             [facility] - .    Specialty: Emergency Medicine  Why: As needed, If symptoms worsen  Contact information:  [address] [facility] [address] [phone]                I discussed with patient and/or family/caretaker that evaluation in the ED does not suggest any emergent or life threatening medical conditions requiring immediate intervention beyond what was provided in the ED, and I believe patient is safe for discharge. Regardless, an unremarkable evaluation in the ED does not preclude the development or presence of a serious or life threatening condition. As such, patient was instructed to return immediately for any worsening or change in current symptoms.    I discussed wound care precautions with patient and/or family/caretaker; specifically that all wounds have risk of infection despite efforts to cleanse and debride the wound; and there is a risk of an occult foreign body (and thus increased risk of infection) despite a negative examination.  I discussed with patient need to return for any signs of infection, specifically redness, increased pain, fever, drainage of pus, or any concern, immediately.        MEDICAL DECISION MAKING                 CLINICAL IMPRESSION       ICD-10-CM ICD-9-CM   1. Skin tear of left elbow without complication, initial encounter  " S51.012A 881.01   2. Elbow injury, left, initial encounter  S59.902A 959.3       Disposition:   Disposition: Discharged  Condition: Stable         Александр Barriga NP  11/24/20 6816

## 2020-12-02 ENCOUNTER — LAB VISIT (OUTPATIENT)
Dept: LAB | Facility: HOSPITAL | Age: 75
End: 2020-12-02
Attending: PEDIATRICS
Payer: MEDICARE

## 2020-12-02 DIAGNOSIS — N18.30 CONTROLLED TYPE 2 DIABETES MELLITUS WITH STAGE 3 CHRONIC KIDNEY DISEASE, WITHOUT LONG-TERM CURRENT USE OF INSULIN: ICD-10-CM

## 2020-12-02 DIAGNOSIS — E11.22 CONTROLLED TYPE 2 DIABETES MELLITUS WITH STAGE 3 CHRONIC KIDNEY DISEASE, WITHOUT LONG-TERM CURRENT USE OF INSULIN: ICD-10-CM

## 2020-12-02 LAB
ALT SERPL W/O P-5'-P-CCNC: 12 U/L (ref 10–44)
ANION GAP SERPL CALC-SCNC: 10 MMOL/L (ref 8–16)
AST SERPL-CCNC: 18 U/L (ref 10–40)
BUN SERPL-MCNC: 17 MG/DL (ref 8–23)
CALCIUM SERPL-MCNC: 10.1 MG/DL (ref 8.7–10.5)
CHLORIDE SERPL-SCNC: 104 MMOL/L (ref 95–110)
CHOLEST SERPL-MCNC: 141 MG/DL (ref 120–199)
CHOLEST/HDLC SERPL: 2.4 {RATIO} (ref 2–5)
CO2 SERPL-SCNC: 25 MMOL/L (ref 23–29)
CREAT SERPL-MCNC: 0.9 MG/DL (ref 0.5–1.4)
EST. GFR  (AFRICAN AMERICAN): >60 ML/MIN/1.73 M^2
EST. GFR  (NON AFRICAN AMERICAN): >60 ML/MIN/1.73 M^2
GLUCOSE SERPL-MCNC: 108 MG/DL (ref 70–110)
HDLC SERPL-MCNC: 59 MG/DL (ref 40–75)
HDLC SERPL: 41.8 % (ref 20–50)
LDLC SERPL CALC-MCNC: 61.2 MG/DL (ref 63–159)
NONHDLC SERPL-MCNC: 82 MG/DL
POTASSIUM SERPL-SCNC: 4.5 MMOL/L (ref 3.5–5.1)
SODIUM SERPL-SCNC: 139 MMOL/L (ref 136–145)
TRIGL SERPL-MCNC: 104 MG/DL (ref 30–150)

## 2020-12-02 PROCEDURE — 83036 HEMOGLOBIN GLYCOSYLATED A1C: CPT | Mod: HCNC

## 2020-12-02 PROCEDURE — 36415 COLL VENOUS BLD VENIPUNCTURE: CPT | Mod: HCNC

## 2020-12-02 PROCEDURE — 84460 ALANINE AMINO (ALT) (SGPT): CPT | Mod: HCNC

## 2020-12-02 PROCEDURE — 80061 LIPID PANEL: CPT | Mod: HCNC

## 2020-12-02 PROCEDURE — 84450 TRANSFERASE (AST) (SGOT): CPT | Mod: HCNC

## 2020-12-02 PROCEDURE — 80048 BASIC METABOLIC PNL TOTAL CA: CPT | Mod: HCNC

## 2020-12-03 LAB
ESTIMATED AVG GLUCOSE: 97 MG/DL (ref 68–131)
HBA1C MFR BLD HPLC: 5 % (ref 4–5.6)

## 2020-12-08 ENCOUNTER — OFFICE VISIT (OUTPATIENT)
Dept: INTERNAL MEDICINE | Facility: CLINIC | Age: 75
End: 2020-12-08
Payer: MEDICARE

## 2020-12-08 ENCOUNTER — OFFICE VISIT (OUTPATIENT)
Dept: OPHTHALMOLOGY | Facility: CLINIC | Age: 75
End: 2020-12-08
Payer: MEDICARE

## 2020-12-08 VITALS
RESPIRATION RATE: 16 BRPM | OXYGEN SATURATION: 98 % | SYSTOLIC BLOOD PRESSURE: 122 MMHG | HEIGHT: 64 IN | DIASTOLIC BLOOD PRESSURE: 62 MMHG | BODY MASS INDEX: 24.1 KG/M2 | TEMPERATURE: 98 F | HEART RATE: 98 BPM | WEIGHT: 141.13 LBS

## 2020-12-08 DIAGNOSIS — J41.1 MUCOPURULENT CHRONIC BRONCHITIS: Chronic | ICD-10-CM

## 2020-12-08 DIAGNOSIS — H40.013 OPEN ANGLE WITH BORDERLINE FINDINGS, LOW RISK, BILATERAL: Primary | ICD-10-CM

## 2020-12-08 DIAGNOSIS — H35.371 EPIRETINAL MEMBRANE (ERM) OF RIGHT EYE: ICD-10-CM

## 2020-12-08 DIAGNOSIS — Z96.1 PSEUDOPHAKIA OF BOTH EYES: ICD-10-CM

## 2020-12-08 DIAGNOSIS — M48.062 SPINAL STENOSIS OF LUMBAR REGION WITH NEUROGENIC CLAUDICATION: ICD-10-CM

## 2020-12-08 DIAGNOSIS — Z87.891 HISTORY OF TOBACCO ABUSE: ICD-10-CM

## 2020-12-08 DIAGNOSIS — K21.9 GASTROESOPHAGEAL REFLUX DISEASE, UNSPECIFIED WHETHER ESOPHAGITIS PRESENT: ICD-10-CM

## 2020-12-08 DIAGNOSIS — J30.9 CHRONIC ALLERGIC RHINITIS: ICD-10-CM

## 2020-12-08 DIAGNOSIS — B02.29 POSTHERPETIC NEURALGIA: ICD-10-CM

## 2020-12-08 DIAGNOSIS — H17.9 CORNEAL SCAR, LEFT EYE: ICD-10-CM

## 2020-12-08 DIAGNOSIS — N18.30 CONTROLLED TYPE 2 DIABETES MELLITUS WITH STAGE 3 CHRONIC KIDNEY DISEASE, WITHOUT LONG-TERM CURRENT USE OF INSULIN: Primary | ICD-10-CM

## 2020-12-08 DIAGNOSIS — Z86.79 HISTORY OF CEREBRAL ANEURYSM REPAIR: ICD-10-CM

## 2020-12-08 DIAGNOSIS — R56.9 FOCAL SEIZURES: ICD-10-CM

## 2020-12-08 DIAGNOSIS — E11.22 CONTROLLED TYPE 2 DIABETES MELLITUS WITH STAGE 3 CHRONIC KIDNEY DISEASE, WITHOUT LONG-TERM CURRENT USE OF INSULIN: Primary | ICD-10-CM

## 2020-12-08 DIAGNOSIS — Z98.890 HISTORY OF CEREBRAL ANEURYSM REPAIR: ICD-10-CM

## 2020-12-08 DIAGNOSIS — R29.6 RECURRENT FALLS: ICD-10-CM

## 2020-12-08 DIAGNOSIS — I25.118 CORONARY ARTERY DISEASE OF NATIVE ARTERY OF NATIVE HEART WITH STABLE ANGINA PECTORIS: ICD-10-CM

## 2020-12-08 DIAGNOSIS — H04.129 DRY EYE: ICD-10-CM

## 2020-12-08 DIAGNOSIS — H52.7 REFRACTIVE ERROR: ICD-10-CM

## 2020-12-08 DIAGNOSIS — I10 ESSENTIAL HYPERTENSION: ICD-10-CM

## 2020-12-08 DIAGNOSIS — Z86.73 HX OF COMPLETED STROKE: ICD-10-CM

## 2020-12-08 DIAGNOSIS — M81.0 AGE-RELATED OSTEOPOROSIS WITHOUT CURRENT PATHOLOGICAL FRACTURE: ICD-10-CM

## 2020-12-08 PROCEDURE — 99999 PR PBB SHADOW E&M-EST. PATIENT-LVL III: CPT | Mod: PBBFAC,HCNC,, | Performed by: OPHTHALMOLOGY

## 2020-12-08 PROCEDURE — 3288F PR FALLS RISK ASSESSMENT DOCUMENTED: ICD-10-PCS | Mod: HCNC,CPTII,S$GLB, | Performed by: PEDIATRICS

## 2020-12-08 PROCEDURE — 1126F AMNT PAIN NOTED NONE PRSNT: CPT | Mod: HCNC,S$GLB,, | Performed by: PEDIATRICS

## 2020-12-08 PROCEDURE — 92002 PR EYE EXAM, NEW PATIENT,INTERMED: ICD-10-PCS | Mod: HCNC,S$GLB,, | Performed by: OPHTHALMOLOGY

## 2020-12-08 PROCEDURE — 1159F PR MEDICATION LIST DOCUMENTED IN MEDICAL RECORD: ICD-10-PCS | Mod: HCNC,S$GLB,, | Performed by: PEDIATRICS

## 2020-12-08 PROCEDURE — 92134 POSTERIOR SEGMENT OCT RETINA (OCULAR COHERENCE TOMOGRAPHY)-BOTH EYES: ICD-10-PCS | Mod: HCNC,S$GLB,, | Performed by: OPHTHALMOLOGY

## 2020-12-08 PROCEDURE — 99214 OFFICE O/P EST MOD 30 MIN: CPT | Mod: HCNC,S$GLB,, | Performed by: PEDIATRICS

## 2020-12-08 PROCEDURE — 3288F FALL RISK ASSESSMENT DOCD: CPT | Mod: HCNC,CPTII,S$GLB, | Performed by: PEDIATRICS

## 2020-12-08 PROCEDURE — 1101F PR PT FALLS ASSESS DOC 0-1 FALLS W/OUT INJ PAST YR: ICD-10-PCS | Mod: HCNC,CPTII,S$GLB, | Performed by: PEDIATRICS

## 2020-12-08 PROCEDURE — 3078F PR MOST RECENT DIASTOLIC BLOOD PRESSURE < 80 MM HG: ICD-10-PCS | Mod: HCNC,CPTII,S$GLB, | Performed by: PEDIATRICS

## 2020-12-08 PROCEDURE — 99214 PR OFFICE/OUTPT VISIT, EST, LEVL IV, 30-39 MIN: ICD-10-PCS | Mod: HCNC,S$GLB,, | Performed by: PEDIATRICS

## 2020-12-08 PROCEDURE — 3078F DIAST BP <80 MM HG: CPT | Mod: HCNC,CPTII,S$GLB, | Performed by: PEDIATRICS

## 2020-12-08 PROCEDURE — 92015 DETERMINE REFRACTIVE STATE: CPT | Mod: HCNC,S$GLB,, | Performed by: OPHTHALMOLOGY

## 2020-12-08 PROCEDURE — 3074F PR MOST RECENT SYSTOLIC BLOOD PRESSURE < 130 MM HG: ICD-10-PCS | Mod: HCNC,CPTII,S$GLB, | Performed by: PEDIATRICS

## 2020-12-08 PROCEDURE — 1126F PR PAIN SEVERITY QUANTIFIED, NO PAIN PRESENT: ICD-10-PCS | Mod: HCNC,S$GLB,, | Performed by: PEDIATRICS

## 2020-12-08 PROCEDURE — 2023F DILAT RTA XM W/O RTNOPTHY: CPT | Mod: HCNC,S$GLB,, | Performed by: OPHTHALMOLOGY

## 2020-12-08 PROCEDURE — 3044F HG A1C LEVEL LT 7.0%: CPT | Mod: HCNC,CPTII,S$GLB, | Performed by: PEDIATRICS

## 2020-12-08 PROCEDURE — 99999 PR PBB SHADOW E&M-EST. PATIENT-LVL V: ICD-10-PCS | Mod: PBBFAC,HCNC,, | Performed by: PEDIATRICS

## 2020-12-08 PROCEDURE — 92002 INTRM OPH EXAM NEW PATIENT: CPT | Mod: HCNC,S$GLB,, | Performed by: OPHTHALMOLOGY

## 2020-12-08 PROCEDURE — 99999 PR PBB SHADOW E&M-EST. PATIENT-LVL III: ICD-10-PCS | Mod: PBBFAC,HCNC,, | Performed by: OPHTHALMOLOGY

## 2020-12-08 PROCEDURE — 3074F SYST BP LT 130 MM HG: CPT | Mod: HCNC,CPTII,S$GLB, | Performed by: PEDIATRICS

## 2020-12-08 PROCEDURE — 1159F MED LIST DOCD IN RCRD: CPT | Mod: HCNC,S$GLB,, | Performed by: PEDIATRICS

## 2020-12-08 PROCEDURE — 92015 PR REFRACTION: ICD-10-PCS | Mod: HCNC,S$GLB,, | Performed by: OPHTHALMOLOGY

## 2020-12-08 PROCEDURE — 2023F PR DILATED RETINAL EXAM W/O EVID OF RETINOPATHY: ICD-10-PCS | Mod: HCNC,S$GLB,, | Performed by: OPHTHALMOLOGY

## 2020-12-08 PROCEDURE — 3044F PR MOST RECENT HEMOGLOBIN A1C LEVEL <7.0%: ICD-10-PCS | Mod: HCNC,CPTII,S$GLB, | Performed by: PEDIATRICS

## 2020-12-08 PROCEDURE — 99999 PR PBB SHADOW E&M-EST. PATIENT-LVL V: CPT | Mod: PBBFAC,HCNC,, | Performed by: PEDIATRICS

## 2020-12-08 PROCEDURE — 92134 CPTRZ OPH DX IMG PST SGM RTA: CPT | Mod: HCNC,S$GLB,, | Performed by: OPHTHALMOLOGY

## 2020-12-08 PROCEDURE — 1101F PT FALLS ASSESS-DOCD LE1/YR: CPT | Mod: HCNC,CPTII,S$GLB, | Performed by: PEDIATRICS

## 2020-12-08 RX ORDER — ROFLUMILAST 500 UG/1
1 TABLET ORAL DAILY
COMMUNITY
Start: 2020-11-10 | End: 2021-03-01 | Stop reason: SDUPTHER

## 2020-12-08 RX ORDER — DICLOFENAC SODIUM 25 MG/1
25 TABLET, DELAYED RELEASE ORAL 2 TIMES DAILY
Qty: 60 TABLET | Refills: 11 | Status: SHIPPED | OUTPATIENT
Start: 2020-12-08 | End: 2021-12-20

## 2020-12-08 NOTE — PROGRESS NOTES
SUBJECTIVE  Jetty Veronica Felix is 74 y.o. female  Corrected distance visual acuity was 20/30 -2 in the right eye and 20/70 -1 in the left eye.   Chief Complaint   Patient presents with    Blurred Vision     pt states she is having blurred vision. states left side of face always hurts. states OS>OD tears in the morning           HPI     Blurred Vision      Additional comments: pt states she is having blurred vision. states left   side of face always hurts. states OS>OD tears in the morning               Comments     np to cpg     1. FX coag-mother   Pt states she has had elevated pressures in the past.   2. K scar OS  H/O herpes zoster keratoconjunctivitis  With post herpetic neuralgia  3. ERM OD  CR scar OD  4. DM-2005  5. PCIOL OS? -done in [place]          Last edited by Pham Sharp MA on 12/8/2020  2:32 PM. (History)         Assessment /Plan :  1. Open angle with borderline findings, low risk, bilateral evaluate further at patient's next visit   2. Epiretinal membrane (ERM) of right eye monitor for now   3. Corneal scar, left eye monitor for now   4. Pseudophakia of both eyes  -- Condition stable, no therapeutic change required. Monitoring routinely.     5. Dry eye Findings and symptoms consistent with dry eyes. Dry eye instruction sheet reviewed with patient recommending:AT's qid/titrate prn, Lubricating Oint qhs and prn and [place] 3 Fish Oils 4320-5532 mg po bid     6. Refractive error PAL Rx     Return to clinic in 6 weeks  or as needed.  With Dilation and HVF 24-2

## 2020-12-08 NOTE — Clinical Note
[facility] the [facility] were stuck in repeating loop. If she does not qualify(she is on list you gave me), let my staff know so I can arrange f/u merrill me

## 2020-12-08 NOTE — PROGRESS NOTES
Subjective:       Patient ID: Shireen Felix is a 74 y.o. female.    Chief Complaint: Follow-up and Results    DM: BS normal on occasional checking several times a week. No hyper/hypoglycemic symptoms. No lows.  Seizures: none  Hx CVA: stable. Fimished PT,  Now walking and no assistance with ambulation and no further fall risk. Living at her own home with close brother support  CAD: no CP  COPD: stable SOB/TURCIOS  GERD: quiet  Chronic pain and DJD: Always symptomatic, overall doing well despite daily pain, off narcotics.     LABS REVIEWED AND DISCUSSED WITH PATIENT    Review of Systems   Constitutional: Negative for fever and unexpected weight change.   HENT: Negative for congestion and rhinorrhea.    Eyes: Negative for discharge and redness.   Respiratory: Negative for cough, shortness of breath and wheezing.    Cardiovascular: Negative for chest pain, palpitations and leg swelling.   Gastrointestinal: Negative for abdominal pain, constipation, diarrhea and vomiting.   Endocrine: Negative for polydipsia, polyphagia and polyuria.   Genitourinary: Negative for decreased urine volume, difficulty urinating and menstrual problem.   Musculoskeletal: Positive for arthralgias, back pain and gait problem. Negative for joint swelling.   Skin: Negative for rash and wound.   Neurological: Negative for syncope and headaches.   Psychiatric/Behavioral: Negative for behavioral problems and sleep disturbance.       Objective:      Physical Exam  Constitutional:       General: She is not in acute distress.     Appearance: She is well-developed.   Neck:      Thyroid: No thyromegaly.      Vascular: No JVD.   Cardiovascular:      Rate and Rhythm: Normal rate and regular rhythm.      Heart sounds: Normal heart sounds. No murmur.   Pulmonary:      Effort: Pulmonary effort is normal. No respiratory distress.      Breath sounds: Normal breath sounds. No wheezing or rales.   Abdominal:      General: There is no distension.       Palpations: Abdomen is soft. There is no mass.      Tenderness: There is no abdominal tenderness. There is no guarding.   Lymphadenopathy:      Cervical: No cervical adenopathy.   Skin:     Capillary Refill: Capillary refill takes less than 2 seconds.      Findings: No rash.   Neurological:      Mental Status: She is alert and oriented to person, place, and time.      Cranial Nerves: No cranial nerve deficit.      Coordination: Coordination normal.   Psychiatric:         Behavior: Behavior normal.         Thought Content: Thought content normal.         Judgment: Judgment normal.         Assessment:       1. Controlled type 2 diabetes mellitus with stage 3 chronic kidney disease, without long-term current use of insulin    2. Postherpetic neuralgia    3. Chronic allergic rhinitis    4. Coronary artery disease of native artery of native heart with stable angina pectoris    5. Hx of completed stroke    6. Focal seizures    7. Gastroesophageal reflux disease, unspecified whether esophagitis present    8. History of cerebral aneurysm repair    9. History of tobacco abuse    10. Essential hypertension    11. Spinal stenosis of lumbar region with neurogenic claudication    12. Mucopurulent chronic bronchitis    13. Recurrent falls    14. Age-related osteoporosis without current pathological fracture        Plan:       Controlled type 2 diabetes mellitus with stage 3 chronic kidney disease, without long-term current use of insulin    Postherpetic neuralgia    Chronic allergic rhinitis    Coronary artery disease of native artery of native heart with stable angina pectoris    Hx of completed stroke    Focal seizures    Gastroesophageal reflux disease, unspecified whether esophagitis present    History of cerebral aneurysm repair    History of tobacco abuse    Essential hypertension    Spinal stenosis of lumbar region with neurogenic claudication    Mucopurulent chronic bronchitis    Recurrent falls    Age-related  osteoporosis without current pathological fracture  -     Ambulatory referral/consult to Optometry; Future; Expected date: 12/15/2020    Other orders  -     diclofenac (VOLTAREN) 25 MG TbEC; Take 1 tablet (25 mg total) by mouth 2 (two) times daily.  Dispense: 60 tablet; Refill: 11    I had meeting with Dr Maguire this AM, she spoke to me of the [employer] program and told me patient is eligible. I spoke with patient, she agrees. She is currently at goal with DM, B/P, and lipids.

## 2020-12-11 ENCOUNTER — PATIENT MESSAGE (OUTPATIENT)
Dept: OTHER | Facility: OTHER | Age: 75
End: 2020-12-11

## 2021-01-05 ENCOUNTER — TELEPHONE (OUTPATIENT)
Dept: ORTHOPEDICS | Facility: CLINIC | Age: 76
End: 2021-01-05

## 2021-01-08 ENCOUNTER — TELEPHONE (OUTPATIENT)
Dept: INTERNAL MEDICINE | Facility: CLINIC | Age: 76
End: 2021-01-08

## 2021-01-22 ENCOUNTER — PATIENT MESSAGE (OUTPATIENT)
Dept: ADMINISTRATIVE | Facility: OTHER | Age: 76
End: 2021-01-22

## 2021-01-27 ENCOUNTER — PATIENT OUTREACH (OUTPATIENT)
Dept: ADMINISTRATIVE | Facility: OTHER | Age: 76
End: 2021-01-27

## 2021-01-28 ENCOUNTER — LAB VISIT (OUTPATIENT)
Dept: LAB | Facility: HOSPITAL | Age: 76
End: 2021-01-28
Attending: INTERNAL MEDICINE
Payer: MEDICARE

## 2021-01-28 ENCOUNTER — OFFICE VISIT (OUTPATIENT)
Dept: HEMATOLOGY/ONCOLOGY | Facility: CLINIC | Age: 76
End: 2021-01-28
Payer: MEDICARE

## 2021-01-28 ENCOUNTER — OFFICE VISIT (OUTPATIENT)
Dept: ORTHOPEDICS | Facility: CLINIC | Age: 76
End: 2021-01-28
Payer: MEDICARE

## 2021-01-28 VITALS
HEART RATE: 92 BPM | HEIGHT: 64 IN | BODY MASS INDEX: 23.53 KG/M2 | DIASTOLIC BLOOD PRESSURE: 80 MMHG | WEIGHT: 137.81 LBS | SYSTOLIC BLOOD PRESSURE: 169 MMHG

## 2021-01-28 VITALS
OXYGEN SATURATION: 98 % | SYSTOLIC BLOOD PRESSURE: 143 MMHG | BODY MASS INDEX: 23.53 KG/M2 | HEIGHT: 64 IN | RESPIRATION RATE: 16 BRPM | HEART RATE: 89 BPM | WEIGHT: 137.81 LBS | TEMPERATURE: 97 F | DIASTOLIC BLOOD PRESSURE: 82 MMHG

## 2021-01-28 DIAGNOSIS — D64.9 ANEMIA, UNSPECIFIED TYPE: Primary | ICD-10-CM

## 2021-01-28 DIAGNOSIS — M18.12 ARTHRITIS OF CARPOMETACARPAL (CMC) JOINT OF LEFT THUMB: ICD-10-CM

## 2021-01-28 DIAGNOSIS — M25.511 CHRONIC RIGHT SHOULDER PAIN: Primary | ICD-10-CM

## 2021-01-28 DIAGNOSIS — G89.29 CHRONIC RIGHT SHOULDER PAIN: Primary | ICD-10-CM

## 2021-01-28 DIAGNOSIS — M19.011 DJD OF RIGHT AC (ACROMIOCLAVICULAR) JOINT: ICD-10-CM

## 2021-01-28 DIAGNOSIS — R20.2 NUMBNESS AND TINGLING OF FOOT: ICD-10-CM

## 2021-01-28 DIAGNOSIS — R20.0 NUMBNESS AND TINGLING IN LEFT HAND: ICD-10-CM

## 2021-01-28 DIAGNOSIS — D64.9 ANEMIA, UNSPECIFIED TYPE: ICD-10-CM

## 2021-01-28 DIAGNOSIS — E11.59 HYPERTENSION ASSOCIATED WITH DIABETES: ICD-10-CM

## 2021-01-28 DIAGNOSIS — R20.2 NUMBNESS AND TINGLING IN RIGHT HAND: ICD-10-CM

## 2021-01-28 DIAGNOSIS — M75.121 COMPLETE TEAR OF RIGHT ROTATOR CUFF, UNSPECIFIED WHETHER TRAUMATIC: ICD-10-CM

## 2021-01-28 DIAGNOSIS — R23.3 SPONTANEOUS ECCHYMOSIS: ICD-10-CM

## 2021-01-28 DIAGNOSIS — I15.2 HYPERTENSION ASSOCIATED WITH DIABETES: ICD-10-CM

## 2021-01-28 DIAGNOSIS — Z87.898 HISTORY OF SEIZURE: ICD-10-CM

## 2021-01-28 DIAGNOSIS — R20.2 NUMBNESS AND TINGLING IN LEFT HAND: ICD-10-CM

## 2021-01-28 DIAGNOSIS — R20.0 NUMBNESS AND TINGLING OF FOOT: ICD-10-CM

## 2021-01-28 DIAGNOSIS — R20.0 NUMBNESS AND TINGLING IN RIGHT HAND: ICD-10-CM

## 2021-01-28 DIAGNOSIS — D50.8 OTHER IRON DEFICIENCY ANEMIA: ICD-10-CM

## 2021-01-28 DIAGNOSIS — M18.11 ARTHRITIS OF CARPOMETACARPAL (CMC) JOINT OF RIGHT THUMB: ICD-10-CM

## 2021-01-28 LAB
ALBUMIN SERPL BCP-MCNC: 3.9 G/DL (ref 3.5–5.2)
ALP SERPL-CCNC: 63 U/L (ref 55–135)
ALT SERPL W/O P-5'-P-CCNC: 16 U/L (ref 10–44)
ANION GAP SERPL CALC-SCNC: 9 MMOL/L (ref 8–16)
AST SERPL-CCNC: 18 U/L (ref 10–40)
BASOPHILS # BLD AUTO: 0.03 K/UL (ref 0–0.2)
BASOPHILS NFR BLD: 0.5 % (ref 0–1.9)
BILIRUB SERPL-MCNC: 0.3 MG/DL (ref 0.1–1)
BUN SERPL-MCNC: 19 MG/DL (ref 8–23)
CALCIUM SERPL-MCNC: 10 MG/DL (ref 8.7–10.5)
CHLORIDE SERPL-SCNC: 103 MMOL/L (ref 95–110)
CO2 SERPL-SCNC: 25 MMOL/L (ref 23–29)
CREAT SERPL-MCNC: 0.8 MG/DL (ref 0.5–1.4)
DIFFERENTIAL METHOD: ABNORMAL
EOSINOPHIL # BLD AUTO: 0.2 K/UL (ref 0–0.5)
EOSINOPHIL NFR BLD: 3.5 % (ref 0–8)
ERYTHROCYTE [DISTWIDTH] IN BLOOD BY AUTOMATED COUNT: 12.4 % (ref 11.5–14.5)
EST. GFR  (AFRICAN AMERICAN): >60 ML/MIN/1.73 M^2
EST. GFR  (NON AFRICAN AMERICAN): >60 ML/MIN/1.73 M^2
FERRITIN SERPL-MCNC: 219 NG/ML (ref 20–300)
GLUCOSE SERPL-MCNC: 95 MG/DL (ref 70–110)
HCT VFR BLD AUTO: 40.1 % (ref 37–48.5)
HGB BLD-MCNC: 12.5 G/DL (ref 12–16)
IMM GRANULOCYTES # BLD AUTO: 0.02 K/UL (ref 0–0.04)
IMM GRANULOCYTES NFR BLD AUTO: 0.3 % (ref 0–0.5)
IRON SERPL-MCNC: 75 UG/DL (ref 30–160)
LYMPHOCYTES # BLD AUTO: 1.6 K/UL (ref 1–4.8)
LYMPHOCYTES NFR BLD: 26.9 % (ref 18–48)
MCH RBC QN AUTO: 30 PG (ref 27–31)
MCHC RBC AUTO-ENTMCNC: 31.2 G/DL (ref 32–36)
MCV RBC AUTO: 96 FL (ref 82–98)
MONOCYTES # BLD AUTO: 0.5 K/UL (ref 0.3–1)
MONOCYTES NFR BLD: 8.4 % (ref 4–15)
NEUTROPHILS # BLD AUTO: 3.6 K/UL (ref 1.8–7.7)
NEUTROPHILS NFR BLD: 60.4 % (ref 38–73)
NRBC BLD-RTO: 0 /100 WBC
PLATELET # BLD AUTO: 223 K/UL (ref 150–350)
PMV BLD AUTO: 9.3 FL (ref 9.2–12.9)
POTASSIUM SERPL-SCNC: 4.1 MMOL/L (ref 3.5–5.1)
PROT SERPL-MCNC: 8.1 G/DL (ref 6–8.4)
RBC # BLD AUTO: 4.17 M/UL (ref 4–5.4)
SATURATED IRON: 23 % (ref 20–50)
SODIUM SERPL-SCNC: 137 MMOL/L (ref 136–145)
TOTAL IRON BINDING CAPACITY: 330 UG/DL (ref 250–450)
TRANSFERRIN SERPL-MCNC: 223 MG/DL (ref 200–375)
WBC # BLD AUTO: 5.95 K/UL (ref 3.9–12.7)

## 2021-01-28 PROCEDURE — 1100F PTFALLS ASSESS-DOCD GE2>/YR: CPT | Mod: HCNC,CPTII,S$GLB, | Performed by: PHYSICIAN ASSISTANT

## 2021-01-28 PROCEDURE — 3288F FALL RISK ASSESSMENT DOCD: CPT | Mod: HCNC,CPTII,S$GLB, | Performed by: NURSE PRACTITIONER

## 2021-01-28 PROCEDURE — 3079F DIAST BP 80-89 MM HG: CPT | Mod: HCNC,CPTII,S$GLB, | Performed by: NURSE PRACTITIONER

## 2021-01-28 PROCEDURE — 3079F DIAST BP 80-89 MM HG: CPT | Mod: HCNC,CPTII,S$GLB, | Performed by: PHYSICIAN ASSISTANT

## 2021-01-28 PROCEDURE — 1125F AMNT PAIN NOTED PAIN PRSNT: CPT | Mod: HCNC,S$GLB,, | Performed by: PHYSICIAN ASSISTANT

## 2021-01-28 PROCEDURE — 3077F PR MOST RECENT SYSTOLIC BLOOD PRESSURE >= 140 MM HG: ICD-10-PCS | Mod: HCNC,CPTII,S$GLB, | Performed by: PHYSICIAN ASSISTANT

## 2021-01-28 PROCEDURE — 82728 ASSAY OF FERRITIN: CPT | Mod: HCNC

## 2021-01-28 PROCEDURE — 99999 PR PBB SHADOW E&M-EST. PATIENT-LVL V: ICD-10-PCS | Mod: PBBFAC,HCNC,, | Performed by: NURSE PRACTITIONER

## 2021-01-28 PROCEDURE — 1100F PR PT FALLS ASSESS DOC 2+ FALLS/FALL W/INJURY/YR: ICD-10-PCS | Mod: HCNC,CPTII,S$GLB, | Performed by: PHYSICIAN ASSISTANT

## 2021-01-28 PROCEDURE — 3079F PR MOST RECENT DIASTOLIC BLOOD PRESSURE 80-89 MM HG: ICD-10-PCS | Mod: HCNC,CPTII,S$GLB, | Performed by: PHYSICIAN ASSISTANT

## 2021-01-28 PROCEDURE — 1126F AMNT PAIN NOTED NONE PRSNT: CPT | Mod: HCNC,S$GLB,, | Performed by: NURSE PRACTITIONER

## 2021-01-28 PROCEDURE — 99213 PR OFFICE/OUTPT VISIT, EST, LEVL III, 20-29 MIN: ICD-10-PCS | Mod: HCNC,S$GLB,, | Performed by: PHYSICIAN ASSISTANT

## 2021-01-28 PROCEDURE — 99214 PR OFFICE/OUTPT VISIT, EST, LEVL IV, 30-39 MIN: ICD-10-PCS | Mod: HCNC,S$GLB,, | Performed by: NURSE PRACTITIONER

## 2021-01-28 PROCEDURE — 3077F SYST BP >= 140 MM HG: CPT | Mod: HCNC,CPTII,S$GLB, | Performed by: NURSE PRACTITIONER

## 2021-01-28 PROCEDURE — 3077F SYST BP >= 140 MM HG: CPT | Mod: HCNC,CPTII,S$GLB, | Performed by: PHYSICIAN ASSISTANT

## 2021-01-28 PROCEDURE — 80053 COMPREHEN METABOLIC PANEL: CPT | Mod: HCNC

## 2021-01-28 PROCEDURE — 99999 PR PBB SHADOW E&M-EST. PATIENT-LVL V: CPT | Mod: PBBFAC,HCNC,, | Performed by: NURSE PRACTITIONER

## 2021-01-28 PROCEDURE — 1101F PT FALLS ASSESS-DOCD LE1/YR: CPT | Mod: HCNC,CPTII,S$GLB, | Performed by: NURSE PRACTITIONER

## 2021-01-28 PROCEDURE — 3288F PR FALLS RISK ASSESSMENT DOCUMENTED: ICD-10-PCS | Mod: HCNC,CPTII,S$GLB, | Performed by: PHYSICIAN ASSISTANT

## 2021-01-28 PROCEDURE — 3288F FALL RISK ASSESSMENT DOCD: CPT | Mod: HCNC,CPTII,S$GLB, | Performed by: PHYSICIAN ASSISTANT

## 2021-01-28 PROCEDURE — 83540 ASSAY OF IRON: CPT | Mod: HCNC

## 2021-01-28 PROCEDURE — 1159F PR MEDICATION LIST DOCUMENTED IN MEDICAL RECORD: ICD-10-PCS | Mod: HCNC,S$GLB,, | Performed by: PHYSICIAN ASSISTANT

## 2021-01-28 PROCEDURE — 3079F PR MOST RECENT DIASTOLIC BLOOD PRESSURE 80-89 MM HG: ICD-10-PCS | Mod: HCNC,CPTII,S$GLB, | Performed by: NURSE PRACTITIONER

## 2021-01-28 PROCEDURE — 1101F PR PT FALLS ASSESS DOC 0-1 FALLS W/OUT INJ PAST YR: ICD-10-PCS | Mod: HCNC,CPTII,S$GLB, | Performed by: NURSE PRACTITIONER

## 2021-01-28 PROCEDURE — 1159F PR MEDICATION LIST DOCUMENTED IN MEDICAL RECORD: ICD-10-PCS | Mod: HCNC,S$GLB,, | Performed by: NURSE PRACTITIONER

## 2021-01-28 PROCEDURE — 36415 COLL VENOUS BLD VENIPUNCTURE: CPT | Mod: HCNC

## 2021-01-28 PROCEDURE — 99213 OFFICE O/P EST LOW 20 MIN: CPT | Mod: HCNC,S$GLB,, | Performed by: PHYSICIAN ASSISTANT

## 2021-01-28 PROCEDURE — 85025 COMPLETE CBC W/AUTO DIFF WBC: CPT | Mod: HCNC

## 2021-01-28 PROCEDURE — 99999 PR PBB SHADOW E&M-EST. PATIENT-LVL V: ICD-10-PCS | Mod: PBBFAC,HCNC,, | Performed by: PHYSICIAN ASSISTANT

## 2021-01-28 PROCEDURE — 3077F PR MOST RECENT SYSTOLIC BLOOD PRESSURE >= 140 MM HG: ICD-10-PCS | Mod: HCNC,CPTII,S$GLB, | Performed by: NURSE PRACTITIONER

## 2021-01-28 PROCEDURE — 99214 OFFICE O/P EST MOD 30 MIN: CPT | Mod: HCNC,S$GLB,, | Performed by: NURSE PRACTITIONER

## 2021-01-28 PROCEDURE — 1126F PR PAIN SEVERITY QUANTIFIED, NO PAIN PRESENT: ICD-10-PCS | Mod: HCNC,S$GLB,, | Performed by: NURSE PRACTITIONER

## 2021-01-28 PROCEDURE — 1159F MED LIST DOCD IN RCRD: CPT | Mod: HCNC,S$GLB,, | Performed by: PHYSICIAN ASSISTANT

## 2021-01-28 PROCEDURE — 99999 PR PBB SHADOW E&M-EST. PATIENT-LVL V: CPT | Mod: PBBFAC,HCNC,, | Performed by: PHYSICIAN ASSISTANT

## 2021-01-28 PROCEDURE — 1159F MED LIST DOCD IN RCRD: CPT | Mod: HCNC,S$GLB,, | Performed by: NURSE PRACTITIONER

## 2021-01-28 PROCEDURE — 1125F PR PAIN SEVERITY QUANTIFIED, PAIN PRESENT: ICD-10-PCS | Mod: HCNC,S$GLB,, | Performed by: PHYSICIAN ASSISTANT

## 2021-01-28 PROCEDURE — 3288F PR FALLS RISK ASSESSMENT DOCUMENTED: ICD-10-PCS | Mod: HCNC,CPTII,S$GLB, | Performed by: NURSE PRACTITIONER

## 2021-02-01 ENCOUNTER — TELEPHONE (OUTPATIENT)
Dept: RHEUMATOLOGY | Facility: CLINIC | Age: 76
End: 2021-02-01

## 2021-02-01 ENCOUNTER — PATIENT MESSAGE (OUTPATIENT)
Dept: HEPATOLOGY | Facility: CLINIC | Age: 76
End: 2021-02-01

## 2021-02-03 ENCOUNTER — PATIENT MESSAGE (OUTPATIENT)
Dept: GASTROENTEROLOGY | Facility: CLINIC | Age: 76
End: 2021-02-03

## 2021-02-08 ENCOUNTER — TELEPHONE (OUTPATIENT)
Dept: PHYSICAL MEDICINE AND REHAB | Facility: CLINIC | Age: 76
End: 2021-02-08

## 2021-02-08 ENCOUNTER — TELEPHONE (OUTPATIENT)
Dept: NEUROSURGERY | Facility: CLINIC | Age: 76
End: 2021-02-08

## 2021-02-08 ENCOUNTER — TELEPHONE (OUTPATIENT)
Dept: NEUROLOGY | Facility: CLINIC | Age: 76
End: 2021-02-08

## 2021-02-08 ENCOUNTER — TELEPHONE (OUTPATIENT)
Dept: ORTHOPEDICS | Facility: CLINIC | Age: 76
End: 2021-02-08

## 2021-02-22 ENCOUNTER — HOSPITAL ENCOUNTER (OUTPATIENT)
Dept: RADIOLOGY | Facility: HOSPITAL | Age: 76
Discharge: HOME | End: 2021-02-22
Attending: PHYSICIAN ASSISTANT
Payer: MEDICARE

## 2021-02-22 ENCOUNTER — TELEPHONE (OUTPATIENT)
Dept: INTERNAL MEDICINE | Facility: CLINIC | Age: 76
End: 2021-02-22

## 2021-02-22 ENCOUNTER — TELEPHONE (OUTPATIENT)
Dept: URGENT CARE | Facility: CLINIC | Age: 76
End: 2021-02-22

## 2021-02-22 ENCOUNTER — OFFICE VISIT (OUTPATIENT)
Dept: URGENT CARE | Facility: CLINIC | Age: 76
End: 2021-02-22
Payer: MEDICARE

## 2021-02-22 VITALS
SYSTOLIC BLOOD PRESSURE: 120 MMHG | HEART RATE: 94 BPM | TEMPERATURE: 98 F | DIASTOLIC BLOOD PRESSURE: 74 MMHG | OXYGEN SATURATION: 97 %

## 2021-02-22 DIAGNOSIS — J22 LOWER RESPIRATORY INFECTION: ICD-10-CM

## 2021-02-22 DIAGNOSIS — R05.9 COUGH: Primary | ICD-10-CM

## 2021-02-22 DIAGNOSIS — R05.9 COUGH: ICD-10-CM

## 2021-02-22 LAB
CTP QC/QA: YES
SARS-COV-2 RDRP RESP QL NAA+PROBE: NEGATIVE

## 2021-02-22 PROCEDURE — 1159F PR MEDICATION LIST DOCUMENTED IN MEDICAL RECORD: ICD-10-PCS | Mod: S$GLB,,, | Performed by: PHYSICIAN ASSISTANT

## 2021-02-22 PROCEDURE — 1125F AMNT PAIN NOTED PAIN PRSNT: CPT | Mod: S$GLB,,, | Performed by: PHYSICIAN ASSISTANT

## 2021-02-22 PROCEDURE — 3074F SYST BP LT 130 MM HG: CPT | Mod: CPTII,S$GLB,, | Performed by: PHYSICIAN ASSISTANT

## 2021-02-22 PROCEDURE — 3288F FALL RISK ASSESSMENT DOCD: CPT | Mod: CPTII,S$GLB,, | Performed by: PHYSICIAN ASSISTANT

## 2021-02-22 PROCEDURE — 99214 OFFICE O/P EST MOD 30 MIN: CPT | Mod: S$GLB,,, | Performed by: PHYSICIAN ASSISTANT

## 2021-02-22 PROCEDURE — 71046 X-RAY EXAM CHEST 2 VIEWS: CPT | Mod: TC,PO

## 2021-02-22 PROCEDURE — 71046 XR CHEST PA AND LATERAL: ICD-10-PCS | Mod: 26,,, | Performed by: RADIOLOGY

## 2021-02-22 PROCEDURE — 71046 X-RAY EXAM CHEST 2 VIEWS: CPT | Mod: 26,,, | Performed by: RADIOLOGY

## 2021-02-22 PROCEDURE — 99999 PR PBB SHADOW E&M-EST. PATIENT-LVL V: ICD-10-PCS | Mod: PBBFAC,,, | Performed by: PHYSICIAN ASSISTANT

## 2021-02-22 PROCEDURE — 3078F DIAST BP <80 MM HG: CPT | Mod: CPTII,S$GLB,, | Performed by: PHYSICIAN ASSISTANT

## 2021-02-22 PROCEDURE — 3288F PR FALLS RISK ASSESSMENT DOCUMENTED: ICD-10-PCS | Mod: CPTII,S$GLB,, | Performed by: PHYSICIAN ASSISTANT

## 2021-02-22 PROCEDURE — U0002: ICD-10-PCS | Mod: QW,S$GLB,, | Performed by: PHYSICIAN ASSISTANT

## 2021-02-22 PROCEDURE — 3078F PR MOST RECENT DIASTOLIC BLOOD PRESSURE < 80 MM HG: ICD-10-PCS | Mod: CPTII,S$GLB,, | Performed by: PHYSICIAN ASSISTANT

## 2021-02-22 PROCEDURE — 3074F PR MOST RECENT SYSTOLIC BLOOD PRESSURE < 130 MM HG: ICD-10-PCS | Mod: CPTII,S$GLB,, | Performed by: PHYSICIAN ASSISTANT

## 2021-02-22 PROCEDURE — 1125F PR PAIN SEVERITY QUANTIFIED, PAIN PRESENT: ICD-10-PCS | Mod: S$GLB,,, | Performed by: PHYSICIAN ASSISTANT

## 2021-02-22 PROCEDURE — 99999 PR PBB SHADOW E&M-EST. PATIENT-LVL V: CPT | Mod: PBBFAC,,, | Performed by: PHYSICIAN ASSISTANT

## 2021-02-22 PROCEDURE — 1101F PT FALLS ASSESS-DOCD LE1/YR: CPT | Mod: CPTII,S$GLB,, | Performed by: PHYSICIAN ASSISTANT

## 2021-02-22 PROCEDURE — 1159F MED LIST DOCD IN RCRD: CPT | Mod: S$GLB,,, | Performed by: PHYSICIAN ASSISTANT

## 2021-02-22 PROCEDURE — 99214 PR OFFICE/OUTPT VISIT, EST, LEVL IV, 30-39 MIN: ICD-10-PCS | Mod: S$GLB,,, | Performed by: PHYSICIAN ASSISTANT

## 2021-02-22 PROCEDURE — 1101F PR PT FALLS ASSESS DOC 0-1 FALLS W/OUT INJ PAST YR: ICD-10-PCS | Mod: CPTII,S$GLB,, | Performed by: PHYSICIAN ASSISTANT

## 2021-02-22 PROCEDURE — U0002 COVID-19 LAB TEST NON-CDC: HCPCS | Mod: QW,S$GLB,, | Performed by: PHYSICIAN ASSISTANT

## 2021-02-22 RX ORDER — AZITHROMYCIN 250 MG/1
TABLET, FILM COATED ORAL
Qty: 6 TABLET | Refills: 0 | Status: SHIPPED | OUTPATIENT
Start: 2021-02-22 | End: 2021-02-27

## 2021-02-22 RX ORDER — PREDNISONE 20 MG/1
20 TABLET ORAL DAILY
Qty: 5 TABLET | Refills: 0 | Status: SHIPPED | OUTPATIENT
Start: 2021-02-22 | End: 2021-02-26

## 2021-02-22 RX ORDER — PROMETHAZINE HYDROCHLORIDE AND DEXTROMETHORPHAN HYDROBROMIDE 6.25; 15 MG/5ML; MG/5ML
5 SYRUP ORAL EVERY 8 HOURS PRN
Qty: 118 ML | Refills: 0 | Status: SHIPPED | OUTPATIENT
Start: 2021-02-22 | End: 2021-03-04

## 2021-02-23 ENCOUNTER — TELEPHONE (OUTPATIENT)
Dept: INTERNAL MEDICINE | Facility: CLINIC | Age: 76
End: 2021-02-23

## 2021-02-24 ENCOUNTER — TELEPHONE (OUTPATIENT)
Dept: INTERNAL MEDICINE | Facility: CLINIC | Age: 76
End: 2021-02-24

## 2021-02-24 RX ORDER — HYDROCODONE POLISTIREX AND CHLORPHENIRAMINE POLISTIREX 10; 8 MG/5ML; MG/5ML
5 SUSPENSION, EXTENDED RELEASE ORAL EVERY 12 HOURS PRN
Qty: 115 ML | Refills: 0 | Status: SHIPPED | OUTPATIENT
Start: 2021-02-24 | End: 2021-05-10

## 2021-02-26 ENCOUNTER — INFUSION (OUTPATIENT)
Dept: INFUSION THERAPY | Facility: HOSPITAL | Age: 76
End: 2021-02-26
Attending: INTERNAL MEDICINE
Payer: MEDICARE

## 2021-02-26 ENCOUNTER — TELEPHONE (OUTPATIENT)
Dept: INTERNAL MEDICINE | Facility: CLINIC | Age: 76
End: 2021-02-26

## 2021-02-26 ENCOUNTER — OFFICE VISIT (OUTPATIENT)
Dept: RHEUMATOLOGY | Facility: CLINIC | Age: 76
End: 2021-02-26
Payer: MEDICARE

## 2021-02-26 ENCOUNTER — TELEPHONE (OUTPATIENT)
Dept: RHEUMATOLOGY | Facility: CLINIC | Age: 76
End: 2021-02-26

## 2021-02-26 VITALS
WEIGHT: 143.31 LBS | RESPIRATION RATE: 18 BRPM | BODY MASS INDEX: 24.6 KG/M2 | DIASTOLIC BLOOD PRESSURE: 89 MMHG | OXYGEN SATURATION: 97 % | SYSTOLIC BLOOD PRESSURE: 178 MMHG | HEART RATE: 94 BPM | TEMPERATURE: 98 F

## 2021-02-26 VITALS
DIASTOLIC BLOOD PRESSURE: 82 MMHG | BODY MASS INDEX: 24.6 KG/M2 | SYSTOLIC BLOOD PRESSURE: 144 MMHG | HEART RATE: 87 BPM | WEIGHT: 143.31 LBS

## 2021-02-26 DIAGNOSIS — M81.0 AGE-RELATED OSTEOPOROSIS WITHOUT CURRENT PATHOLOGICAL FRACTURE: Primary | ICD-10-CM

## 2021-02-26 DIAGNOSIS — M19.049 HAND ARTHRITIS: ICD-10-CM

## 2021-02-26 DIAGNOSIS — Z51.81 MEDICATION MONITORING ENCOUNTER: ICD-10-CM

## 2021-02-26 DIAGNOSIS — Z01.89 ENCOUNTER FOR OTHER SPECIFIED SPECIAL EXAMINATIONS: ICD-10-CM

## 2021-02-26 PROCEDURE — 3079F PR MOST RECENT DIASTOLIC BLOOD PRESSURE 80-89 MM HG: ICD-10-PCS | Mod: CPTII,S$GLB,, | Performed by: INTERNAL MEDICINE

## 2021-02-26 PROCEDURE — 1125F AMNT PAIN NOTED PAIN PRSNT: CPT | Mod: S$GLB,,, | Performed by: INTERNAL MEDICINE

## 2021-02-26 PROCEDURE — 3077F PR MOST RECENT SYSTOLIC BLOOD PRESSURE >= 140 MM HG: ICD-10-PCS | Mod: CPTII,S$GLB,, | Performed by: INTERNAL MEDICINE

## 2021-02-26 PROCEDURE — 99214 PR OFFICE/OUTPT VISIT, EST, LEVL IV, 30-39 MIN: ICD-10-PCS | Mod: S$GLB,,, | Performed by: INTERNAL MEDICINE

## 2021-02-26 PROCEDURE — 99214 OFFICE O/P EST MOD 30 MIN: CPT | Mod: S$GLB,,, | Performed by: INTERNAL MEDICINE

## 2021-02-26 PROCEDURE — 96372 THER/PROPH/DIAG INJ SC/IM: CPT

## 2021-02-26 PROCEDURE — 1159F PR MEDICATION LIST DOCUMENTED IN MEDICAL RECORD: ICD-10-PCS | Mod: S$GLB,,, | Performed by: INTERNAL MEDICINE

## 2021-02-26 PROCEDURE — 3077F SYST BP >= 140 MM HG: CPT | Mod: CPTII,S$GLB,, | Performed by: INTERNAL MEDICINE

## 2021-02-26 PROCEDURE — 63600175 PHARM REV CODE 636 W HCPCS: Mod: JG | Performed by: INTERNAL MEDICINE

## 2021-02-26 PROCEDURE — 99999 PR PBB SHADOW E&M-EST. PATIENT-LVL IV: ICD-10-PCS | Mod: PBBFAC,,, | Performed by: INTERNAL MEDICINE

## 2021-02-26 PROCEDURE — 1159F MED LIST DOCD IN RCRD: CPT | Mod: S$GLB,,, | Performed by: INTERNAL MEDICINE

## 2021-02-26 PROCEDURE — 3288F PR FALLS RISK ASSESSMENT DOCUMENTED: ICD-10-PCS | Mod: CPTII,S$GLB,, | Performed by: INTERNAL MEDICINE

## 2021-02-26 PROCEDURE — 1101F PR PT FALLS ASSESS DOC 0-1 FALLS W/OUT INJ PAST YR: ICD-10-PCS | Mod: CPTII,S$GLB,, | Performed by: INTERNAL MEDICINE

## 2021-02-26 PROCEDURE — 99999 PR PBB SHADOW E&M-EST. PATIENT-LVL IV: CPT | Mod: PBBFAC,,, | Performed by: INTERNAL MEDICINE

## 2021-02-26 PROCEDURE — 3288F FALL RISK ASSESSMENT DOCD: CPT | Mod: CPTII,S$GLB,, | Performed by: INTERNAL MEDICINE

## 2021-02-26 PROCEDURE — 1125F PR PAIN SEVERITY QUANTIFIED, PAIN PRESENT: ICD-10-PCS | Mod: S$GLB,,, | Performed by: INTERNAL MEDICINE

## 2021-02-26 PROCEDURE — 1101F PT FALLS ASSESS-DOCD LE1/YR: CPT | Mod: CPTII,S$GLB,, | Performed by: INTERNAL MEDICINE

## 2021-02-26 PROCEDURE — 3079F DIAST BP 80-89 MM HG: CPT | Mod: CPTII,S$GLB,, | Performed by: INTERNAL MEDICINE

## 2021-02-26 RX ORDER — GABAPENTIN 100 MG/1
100 CAPSULE ORAL NIGHTLY
Qty: 30 CAPSULE | Refills: 3 | Status: SHIPPED | OUTPATIENT
Start: 2021-02-26 | End: 2021-03-29 | Stop reason: SDUPTHER

## 2021-02-26 RX ORDER — DEXLANSOPRAZOLE 60 MG/1
1 CAPSULE, DELAYED RELEASE ORAL DAILY
COMMUNITY
Start: 2021-02-01 | End: 2022-06-14

## 2021-02-26 RX ORDER — PREDNISONE 2.5 MG/1
2.5 TABLET ORAL DAILY
Qty: 60 TABLET | Refills: 0 | Status: SHIPPED | OUTPATIENT
Start: 2021-02-26 | End: 2021-05-10 | Stop reason: ALTCHOICE

## 2021-02-26 RX ADMIN — DENOSUMAB 60 MG: 60 INJECTION SUBCUTANEOUS at 09:02

## 2021-03-03 ENCOUNTER — PATIENT OUTREACH (OUTPATIENT)
Dept: ADMINISTRATIVE | Facility: OTHER | Age: 76
End: 2021-03-03

## 2021-03-03 DIAGNOSIS — M19.049 HAND ARTHRITIS: Primary | ICD-10-CM

## 2021-03-04 ENCOUNTER — LAB VISIT (OUTPATIENT)
Dept: LAB | Facility: HOSPITAL | Age: 76
End: 2021-03-04
Attending: INTERNAL MEDICINE
Payer: MEDICARE

## 2021-03-04 ENCOUNTER — OFFICE VISIT (OUTPATIENT)
Dept: PHYSICAL MEDICINE AND REHAB | Facility: CLINIC | Age: 76
End: 2021-03-04
Payer: MEDICARE

## 2021-03-04 VITALS
RESPIRATION RATE: 14 BRPM | HEIGHT: 64 IN | HEART RATE: 74 BPM | DIASTOLIC BLOOD PRESSURE: 78 MMHG | SYSTOLIC BLOOD PRESSURE: 157 MMHG | WEIGHT: 143 LBS | BODY MASS INDEX: 24.41 KG/M2

## 2021-03-04 DIAGNOSIS — G56.21 CUBITAL TUNNEL SYNDROME ON RIGHT: ICD-10-CM

## 2021-03-04 DIAGNOSIS — G56.03 BILATERAL CARPAL TUNNEL SYNDROME: Primary | ICD-10-CM

## 2021-03-04 DIAGNOSIS — M19.049 HAND ARTHRITIS: ICD-10-CM

## 2021-03-04 DIAGNOSIS — M54.16 LUMBAR RADICULOPATHY: ICD-10-CM

## 2021-03-04 DIAGNOSIS — M54.12 CERVICAL RADICULOPATHY: ICD-10-CM

## 2021-03-04 PROCEDURE — 36415 COLL VENOUS BLD VENIPUNCTURE: CPT | Performed by: INTERNAL MEDICINE

## 2021-03-04 PROCEDURE — 95886 PR EMG COMPLETE, W/ NERVE CONDUCTION STUDIES, 5+ MUSCLES: ICD-10-PCS | Mod: S$GLB,,, | Performed by: PHYSICAL MEDICINE & REHABILITATION

## 2021-03-04 PROCEDURE — 99999 PR PBB SHADOW E&M-EST. PATIENT-LVL III: ICD-10-PCS | Mod: PBBFAC,,, | Performed by: PHYSICAL MEDICINE & REHABILITATION

## 2021-03-04 PROCEDURE — 3077F SYST BP >= 140 MM HG: CPT | Mod: CPTII,S$GLB,, | Performed by: PHYSICAL MEDICINE & REHABILITATION

## 2021-03-04 PROCEDURE — 99204 PR OFFICE/OUTPT VISIT, NEW, LEVL IV, 45-59 MIN: ICD-10-PCS | Mod: 25,S$GLB,, | Performed by: PHYSICAL MEDICINE & REHABILITATION

## 2021-03-04 PROCEDURE — 95913 NRV CNDJ TEST 13/> STUDIES: CPT | Mod: S$GLB,,, | Performed by: PHYSICAL MEDICINE & REHABILITATION

## 2021-03-04 PROCEDURE — 3078F PR MOST RECENT DIASTOLIC BLOOD PRESSURE < 80 MM HG: ICD-10-PCS | Mod: CPTII,S$GLB,, | Performed by: PHYSICAL MEDICINE & REHABILITATION

## 2021-03-04 PROCEDURE — 3077F PR MOST RECENT SYSTOLIC BLOOD PRESSURE >= 140 MM HG: ICD-10-PCS | Mod: CPTII,S$GLB,, | Performed by: PHYSICAL MEDICINE & REHABILITATION

## 2021-03-04 PROCEDURE — 1159F PR MEDICATION LIST DOCUMENTED IN MEDICAL RECORD: ICD-10-PCS | Mod: S$GLB,,, | Performed by: PHYSICAL MEDICINE & REHABILITATION

## 2021-03-04 PROCEDURE — 99999 PR PBB SHADOW E&M-EST. PATIENT-LVL III: CPT | Mod: PBBFAC,,, | Performed by: PHYSICAL MEDICINE & REHABILITATION

## 2021-03-04 PROCEDURE — 3078F DIAST BP <80 MM HG: CPT | Mod: CPTII,S$GLB,, | Performed by: PHYSICAL MEDICINE & REHABILITATION

## 2021-03-04 PROCEDURE — 86709 HEPATITIS A IGM ANTIBODY: CPT | Performed by: INTERNAL MEDICINE

## 2021-03-04 PROCEDURE — 99204 OFFICE O/P NEW MOD 45 MIN: CPT | Mod: 25,S$GLB,, | Performed by: PHYSICAL MEDICINE & REHABILITATION

## 2021-03-04 PROCEDURE — 1125F AMNT PAIN NOTED PAIN PRSNT: CPT | Mod: S$GLB,,, | Performed by: PHYSICAL MEDICINE & REHABILITATION

## 2021-03-04 PROCEDURE — 95913 PR NERVE CONDUCTION STUDY; 13 OR MORE STUDIES: ICD-10-PCS | Mod: S$GLB,,, | Performed by: PHYSICAL MEDICINE & REHABILITATION

## 2021-03-04 PROCEDURE — 86160 COMPLEMENT ANTIGEN: CPT | Mod: 59 | Performed by: INTERNAL MEDICINE

## 2021-03-04 PROCEDURE — 86160 COMPLEMENT ANTIGEN: CPT | Performed by: INTERNAL MEDICINE

## 2021-03-04 PROCEDURE — 1159F MED LIST DOCD IN RCRD: CPT | Mod: S$GLB,,, | Performed by: PHYSICAL MEDICINE & REHABILITATION

## 2021-03-04 PROCEDURE — 1125F PR PAIN SEVERITY QUANTIFIED, PAIN PRESENT: ICD-10-PCS | Mod: S$GLB,,, | Performed by: PHYSICAL MEDICINE & REHABILITATION

## 2021-03-04 PROCEDURE — 95886 MUSC TEST DONE W/N TEST COMP: CPT | Mod: S$GLB,,, | Performed by: PHYSICAL MEDICINE & REHABILITATION

## 2021-03-05 ENCOUNTER — TELEPHONE (OUTPATIENT)
Dept: ORTHOPEDICS | Facility: CLINIC | Age: 76
End: 2021-03-05

## 2021-03-05 LAB
C3 SERPL-MCNC: 142 MG/DL (ref 50–180)
C4 SERPL-MCNC: 22 MG/DL (ref 11–44)
HAV IGM SERPL QL IA: ABNORMAL

## 2021-03-06 DIAGNOSIS — B15.9 HEPATITIS A TEST POSITIVE: Primary | ICD-10-CM

## 2021-03-08 ENCOUNTER — PATIENT MESSAGE (OUTPATIENT)
Dept: GASTROENTEROLOGY | Facility: CLINIC | Age: 76
End: 2021-03-08

## 2021-03-10 ENCOUNTER — TELEPHONE (OUTPATIENT)
Dept: PRIMARY CARE CLINIC | Facility: CLINIC | Age: 76
End: 2021-03-10

## 2021-03-11 ENCOUNTER — TELEPHONE (OUTPATIENT)
Dept: INTERNAL MEDICINE | Facility: CLINIC | Age: 76
End: 2021-03-11

## 2021-03-11 ENCOUNTER — TELEPHONE (OUTPATIENT)
Dept: RHEUMATOLOGY | Facility: CLINIC | Age: 76
End: 2021-03-11

## 2021-03-12 ENCOUNTER — TELEPHONE (OUTPATIENT)
Dept: NEUROSURGERY | Facility: CLINIC | Age: 76
End: 2021-03-12

## 2021-03-19 ENCOUNTER — HOSPITAL ENCOUNTER (OUTPATIENT)
Dept: RADIOLOGY | Facility: HOSPITAL | Age: 76
Discharge: HOME | End: 2021-03-19
Attending: PHYSICIAN ASSISTANT
Payer: MEDICARE

## 2021-03-19 ENCOUNTER — TELEPHONE (OUTPATIENT)
Dept: NEUROSURGERY | Facility: CLINIC | Age: 76
End: 2021-03-19

## 2021-03-19 ENCOUNTER — HOSPITAL ENCOUNTER (OUTPATIENT)
Dept: RADIOLOGY | Facility: HOSPITAL | Age: 76
Discharge: HOME | End: 2021-03-19
Attending: NEUROLOGICAL SURGERY
Payer: MEDICARE

## 2021-03-19 DIAGNOSIS — M43.06 SPONDYLOLYSIS OF LUMBAR REGION: ICD-10-CM

## 2021-03-19 DIAGNOSIS — M47.892 OTHER SPONDYLOSIS, CERVICAL REGION: ICD-10-CM

## 2021-03-19 DIAGNOSIS — M43.02 SPONDYLOLYSIS OF CERVICAL REGION: ICD-10-CM

## 2021-03-19 PROCEDURE — 25500020 PHARM REV CODE 255: Performed by: PHYSICIAN ASSISTANT

## 2021-03-19 PROCEDURE — 72132 CT LUMBAR SPINE W/DYE: CPT | Mod: TC

## 2021-03-19 PROCEDURE — 72126 CT NECK SPINE W/DYE: CPT | Mod: TC

## 2021-03-19 PROCEDURE — 62305 MYELOGRAPHY LUMBAR INJECTION: CPT

## 2021-03-19 RX ADMIN — IOHEXOL 10 ML: 300 INJECTION, SOLUTION INTRAVENOUS at 03:03

## 2021-03-20 ENCOUNTER — NURSE TRIAGE (OUTPATIENT)
Dept: ADMINISTRATIVE | Facility: CLINIC | Age: 76
End: 2021-03-20

## 2021-03-23 ENCOUNTER — TELEPHONE (OUTPATIENT)
Dept: NEUROSURGERY | Facility: CLINIC | Age: 76
End: 2021-03-23

## 2021-03-23 ENCOUNTER — OFFICE VISIT (OUTPATIENT)
Dept: ORTHOPEDICS | Facility: CLINIC | Age: 76
End: 2021-03-23
Payer: MEDICARE

## 2021-03-23 ENCOUNTER — TELEPHONE (OUTPATIENT)
Dept: ORTHOPEDICS | Facility: CLINIC | Age: 76
End: 2021-03-23

## 2021-03-23 ENCOUNTER — OFFICE VISIT (OUTPATIENT)
Dept: NEUROSURGERY | Facility: CLINIC | Age: 76
End: 2021-03-23
Payer: MEDICARE

## 2021-03-23 VITALS
WEIGHT: 143.06 LBS | SYSTOLIC BLOOD PRESSURE: 153 MMHG | RESPIRATION RATE: 16 BRPM | HEIGHT: 64 IN | BODY MASS INDEX: 24.42 KG/M2 | DIASTOLIC BLOOD PRESSURE: 86 MMHG | HEART RATE: 96 BPM

## 2021-03-23 VITALS
WEIGHT: 143.06 LBS | BODY MASS INDEX: 24.42 KG/M2 | HEART RATE: 101 BPM | HEIGHT: 64 IN | DIASTOLIC BLOOD PRESSURE: 71 MMHG | SYSTOLIC BLOOD PRESSURE: 133 MMHG

## 2021-03-23 DIAGNOSIS — B02.29 POSTHERPETIC NEURALGIA: ICD-10-CM

## 2021-03-23 DIAGNOSIS — G56.03 BILATERAL CARPAL TUNNEL SYNDROME: ICD-10-CM

## 2021-03-23 DIAGNOSIS — M48.062 SPINAL STENOSIS OF LUMBAR REGION WITH NEUROGENIC CLAUDICATION: Primary | ICD-10-CM

## 2021-03-23 DIAGNOSIS — M54.12 CERVICAL RADICULOPATHY: ICD-10-CM

## 2021-03-23 DIAGNOSIS — M54.16 LUMBAR RADICULOPATHY: ICD-10-CM

## 2021-03-23 DIAGNOSIS — M48.062 LUMBAR STENOSIS WITH NEUROGENIC CLAUDICATION: Primary | ICD-10-CM

## 2021-03-23 DIAGNOSIS — S32.020A COMPRESSION FRACTURE OF L2 VERTEBRA, INITIAL ENCOUNTER: ICD-10-CM

## 2021-03-23 DIAGNOSIS — M43.16 SPONDYLOLISTHESIS, LUMBAR REGION: ICD-10-CM

## 2021-03-23 DIAGNOSIS — G89.4 CHRONIC PAIN SYNDROME: ICD-10-CM

## 2021-03-23 PROCEDURE — 1101F PR PT FALLS ASSESS DOC 0-1 FALLS W/OUT INJ PAST YR: ICD-10-PCS | Mod: CPTII,S$GLB,, | Performed by: FAMILY MEDICINE

## 2021-03-23 PROCEDURE — 99999 PR PBB SHADOW E&M-EST. PATIENT-LVL III: CPT | Mod: PBBFAC,,, | Performed by: FAMILY MEDICINE

## 2021-03-23 PROCEDURE — 99214 OFFICE O/P EST MOD 30 MIN: CPT | Mod: S$GLB,,, | Performed by: FAMILY MEDICINE

## 2021-03-23 PROCEDURE — 1159F PR MEDICATION LIST DOCUMENTED IN MEDICAL RECORD: ICD-10-PCS | Mod: S$GLB,,, | Performed by: FAMILY MEDICINE

## 2021-03-23 PROCEDURE — 99214 PR OFFICE/OUTPT VISIT, EST, LEVL IV, 30-39 MIN: ICD-10-PCS | Mod: S$GLB,,, | Performed by: PHYSICIAN ASSISTANT

## 2021-03-23 PROCEDURE — 3074F SYST BP LT 130 MM HG: CPT | Mod: CPTII,S$GLB,, | Performed by: PHYSICIAN ASSISTANT

## 2021-03-23 PROCEDURE — 1125F AMNT PAIN NOTED PAIN PRSNT: CPT | Mod: S$GLB,,, | Performed by: FAMILY MEDICINE

## 2021-03-23 PROCEDURE — 3288F FALL RISK ASSESSMENT DOCD: CPT | Mod: CPTII,S$GLB,, | Performed by: FAMILY MEDICINE

## 2021-03-23 PROCEDURE — 3288F PR FALLS RISK ASSESSMENT DOCUMENTED: ICD-10-PCS | Mod: CPTII,S$GLB,, | Performed by: FAMILY MEDICINE

## 2021-03-23 PROCEDURE — 1159F MED LIST DOCD IN RCRD: CPT | Mod: S$GLB,,, | Performed by: FAMILY MEDICINE

## 2021-03-23 PROCEDURE — 3075F SYST BP GE 130 - 139MM HG: CPT | Mod: CPTII,S$GLB,, | Performed by: FAMILY MEDICINE

## 2021-03-23 PROCEDURE — 3288F PR FALLS RISK ASSESSMENT DOCUMENTED: ICD-10-PCS | Mod: CPTII,S$GLB,, | Performed by: PHYSICIAN ASSISTANT

## 2021-03-23 PROCEDURE — 99999 PR PBB SHADOW E&M-EST. PATIENT-LVL V: CPT | Mod: PBBFAC,,, | Performed by: PHYSICIAN ASSISTANT

## 2021-03-23 PROCEDURE — 3075F PR MOST RECENT SYSTOLIC BLOOD PRESS GE 130-139MM HG: ICD-10-PCS | Mod: CPTII,S$GLB,, | Performed by: FAMILY MEDICINE

## 2021-03-23 PROCEDURE — 1101F PT FALLS ASSESS-DOCD LE1/YR: CPT | Mod: CPTII,S$GLB,, | Performed by: PHYSICIAN ASSISTANT

## 2021-03-23 PROCEDURE — 1159F PR MEDICATION LIST DOCUMENTED IN MEDICAL RECORD: ICD-10-PCS | Mod: S$GLB,,, | Performed by: PHYSICIAN ASSISTANT

## 2021-03-23 PROCEDURE — 1159F MED LIST DOCD IN RCRD: CPT | Mod: S$GLB,,, | Performed by: PHYSICIAN ASSISTANT

## 2021-03-23 PROCEDURE — 1101F PR PT FALLS ASSESS DOC 0-1 FALLS W/OUT INJ PAST YR: ICD-10-PCS | Mod: CPTII,S$GLB,, | Performed by: PHYSICIAN ASSISTANT

## 2021-03-23 PROCEDURE — 1125F PR PAIN SEVERITY QUANTIFIED, PAIN PRESENT: ICD-10-PCS | Mod: S$GLB,,, | Performed by: PHYSICIAN ASSISTANT

## 2021-03-23 PROCEDURE — 1125F PR PAIN SEVERITY QUANTIFIED, PAIN PRESENT: ICD-10-PCS | Mod: S$GLB,,, | Performed by: FAMILY MEDICINE

## 2021-03-23 PROCEDURE — 99999 PR PBB SHADOW E&M-EST. PATIENT-LVL V: ICD-10-PCS | Mod: PBBFAC,,, | Performed by: PHYSICIAN ASSISTANT

## 2021-03-23 PROCEDURE — 3079F PR MOST RECENT DIASTOLIC BLOOD PRESSURE 80-89 MM HG: ICD-10-PCS | Mod: CPTII,S$GLB,, | Performed by: PHYSICIAN ASSISTANT

## 2021-03-23 PROCEDURE — 99999 PR PBB SHADOW E&M-EST. PATIENT-LVL III: ICD-10-PCS | Mod: PBBFAC,,, | Performed by: FAMILY MEDICINE

## 2021-03-23 PROCEDURE — 3288F FALL RISK ASSESSMENT DOCD: CPT | Mod: CPTII,S$GLB,, | Performed by: PHYSICIAN ASSISTANT

## 2021-03-23 PROCEDURE — 3078F DIAST BP <80 MM HG: CPT | Mod: CPTII,S$GLB,, | Performed by: FAMILY MEDICINE

## 2021-03-23 PROCEDURE — 1101F PT FALLS ASSESS-DOCD LE1/YR: CPT | Mod: CPTII,S$GLB,, | Performed by: FAMILY MEDICINE

## 2021-03-23 PROCEDURE — 3078F PR MOST RECENT DIASTOLIC BLOOD PRESSURE < 80 MM HG: ICD-10-PCS | Mod: CPTII,S$GLB,, | Performed by: FAMILY MEDICINE

## 2021-03-23 PROCEDURE — 3074F PR MOST RECENT SYSTOLIC BLOOD PRESSURE < 130 MM HG: ICD-10-PCS | Mod: CPTII,S$GLB,, | Performed by: PHYSICIAN ASSISTANT

## 2021-03-23 PROCEDURE — 99214 PR OFFICE/OUTPT VISIT, EST, LEVL IV, 30-39 MIN: ICD-10-PCS | Mod: S$GLB,,, | Performed by: FAMILY MEDICINE

## 2021-03-23 PROCEDURE — 3079F DIAST BP 80-89 MM HG: CPT | Mod: CPTII,S$GLB,, | Performed by: PHYSICIAN ASSISTANT

## 2021-03-23 PROCEDURE — 1125F AMNT PAIN NOTED PAIN PRSNT: CPT | Mod: S$GLB,,, | Performed by: PHYSICIAN ASSISTANT

## 2021-03-23 PROCEDURE — 99214 OFFICE O/P EST MOD 30 MIN: CPT | Mod: S$GLB,,, | Performed by: PHYSICIAN ASSISTANT

## 2021-03-23 RX ORDER — PROMETHAZINE HYDROCHLORIDE AND DEXTROMETHORPHAN HYDROBROMIDE 6.25; 15 MG/5ML; MG/5ML
SYRUP ORAL
COMMUNITY
Start: 2021-03-11 | End: 2021-05-10

## 2021-03-29 ENCOUNTER — OFFICE VISIT (OUTPATIENT)
Dept: HEPATOLOGY | Facility: CLINIC | Age: 76
End: 2021-03-29
Payer: MEDICARE

## 2021-03-29 ENCOUNTER — TELEPHONE (OUTPATIENT)
Dept: PAIN MEDICINE | Facility: CLINIC | Age: 76
End: 2021-03-29

## 2021-03-29 ENCOUNTER — OFFICE VISIT (OUTPATIENT)
Dept: RHEUMATOLOGY | Facility: CLINIC | Age: 76
End: 2021-03-29
Payer: MEDICARE

## 2021-03-29 ENCOUNTER — TELEPHONE (OUTPATIENT)
Dept: ORTHOPEDICS | Facility: CLINIC | Age: 76
End: 2021-03-29

## 2021-03-29 VITALS
DIASTOLIC BLOOD PRESSURE: 68 MMHG | HEIGHT: 64 IN | BODY MASS INDEX: 24.57 KG/M2 | SYSTOLIC BLOOD PRESSURE: 112 MMHG | WEIGHT: 143.94 LBS | HEART RATE: 73 BPM

## 2021-03-29 VITALS
HEART RATE: 73 BPM | DIASTOLIC BLOOD PRESSURE: 68 MMHG | WEIGHT: 143.94 LBS | SYSTOLIC BLOOD PRESSURE: 112 MMHG | HEIGHT: 64 IN | BODY MASS INDEX: 24.57 KG/M2

## 2021-03-29 DIAGNOSIS — M81.0 AGE-RELATED OSTEOPOROSIS WITHOUT CURRENT PATHOLOGICAL FRACTURE: ICD-10-CM

## 2021-03-29 DIAGNOSIS — K76.0 FATTY LIVER: ICD-10-CM

## 2021-03-29 DIAGNOSIS — M19.049 HAND ARTHRITIS: ICD-10-CM

## 2021-03-29 DIAGNOSIS — Z23 NEED FOR HEPATITIS A AND B VACCINATION: ICD-10-CM

## 2021-03-29 DIAGNOSIS — G56.03 CARPAL TUNNEL SYNDROME ON BOTH SIDES: ICD-10-CM

## 2021-03-29 DIAGNOSIS — R80.9 PROTEINURIA, UNSPECIFIED TYPE: ICD-10-CM

## 2021-03-29 DIAGNOSIS — M18.0 ARTHRITIS OF CARPOMETACARPAL (CMC) JOINT OF BOTH THUMBS: Primary | ICD-10-CM

## 2021-03-29 DIAGNOSIS — B15.9 HEPATITIS A TEST POSITIVE: Primary | ICD-10-CM

## 2021-03-29 PROCEDURE — 99999 PR PBB SHADOW E&M-EST. PATIENT-LVL V: ICD-10-PCS | Mod: PBBFAC,,, | Performed by: NURSE PRACTITIONER

## 2021-03-29 PROCEDURE — 99214 PR OFFICE/OUTPT VISIT, EST, LEVL IV, 30-39 MIN: ICD-10-PCS | Mod: S$GLB,,, | Performed by: INTERNAL MEDICINE

## 2021-03-29 PROCEDURE — 1125F PR PAIN SEVERITY QUANTIFIED, PAIN PRESENT: ICD-10-PCS | Mod: S$GLB,,, | Performed by: INTERNAL MEDICINE

## 2021-03-29 PROCEDURE — 3288F PR FALLS RISK ASSESSMENT DOCUMENTED: ICD-10-PCS | Mod: CPTII,S$GLB,, | Performed by: NURSE PRACTITIONER

## 2021-03-29 PROCEDURE — 1100F PR PT FALLS ASSESS DOC 2+ FALLS/FALL W/INJURY/YR: ICD-10-PCS | Mod: CPTII,S$GLB,, | Performed by: NURSE PRACTITIONER

## 2021-03-29 PROCEDURE — 3288F FALL RISK ASSESSMENT DOCD: CPT | Mod: CPTII,S$GLB,, | Performed by: INTERNAL MEDICINE

## 2021-03-29 PROCEDURE — 3078F DIAST BP <80 MM HG: CPT | Mod: CPTII,S$GLB,, | Performed by: INTERNAL MEDICINE

## 2021-03-29 PROCEDURE — 1159F MED LIST DOCD IN RCRD: CPT | Mod: S$GLB,,, | Performed by: NURSE PRACTITIONER

## 2021-03-29 PROCEDURE — 99999 PR PBB SHADOW E&M-EST. PATIENT-LVL V: CPT | Mod: PBBFAC,,, | Performed by: INTERNAL MEDICINE

## 2021-03-29 PROCEDURE — 1100F PTFALLS ASSESS-DOCD GE2>/YR: CPT | Mod: CPTII,S$GLB,, | Performed by: NURSE PRACTITIONER

## 2021-03-29 PROCEDURE — 1126F AMNT PAIN NOTED NONE PRSNT: CPT | Mod: S$GLB,,, | Performed by: NURSE PRACTITIONER

## 2021-03-29 PROCEDURE — 1100F PR PT FALLS ASSESS DOC 2+ FALLS/FALL W/INJURY/YR: ICD-10-PCS | Mod: CPTII,S$GLB,, | Performed by: INTERNAL MEDICINE

## 2021-03-29 PROCEDURE — 1125F AMNT PAIN NOTED PAIN PRSNT: CPT | Mod: S$GLB,,, | Performed by: INTERNAL MEDICINE

## 2021-03-29 PROCEDURE — 99214 OFFICE O/P EST MOD 30 MIN: CPT | Mod: S$GLB,,, | Performed by: INTERNAL MEDICINE

## 2021-03-29 PROCEDURE — 3074F SYST BP LT 130 MM HG: CPT | Mod: CPTII,S$GLB,, | Performed by: INTERNAL MEDICINE

## 2021-03-29 PROCEDURE — 3074F PR MOST RECENT SYSTOLIC BLOOD PRESSURE < 130 MM HG: ICD-10-PCS | Mod: CPTII,S$GLB,, | Performed by: NURSE PRACTITIONER

## 2021-03-29 PROCEDURE — 3078F DIAST BP <80 MM HG: CPT | Mod: CPTII,S$GLB,, | Performed by: NURSE PRACTITIONER

## 2021-03-29 PROCEDURE — 99204 OFFICE O/P NEW MOD 45 MIN: CPT | Mod: S$GLB,,, | Performed by: NURSE PRACTITIONER

## 2021-03-29 PROCEDURE — 3288F FALL RISK ASSESSMENT DOCD: CPT | Mod: CPTII,S$GLB,, | Performed by: NURSE PRACTITIONER

## 2021-03-29 PROCEDURE — 1159F MED LIST DOCD IN RCRD: CPT | Mod: S$GLB,,, | Performed by: INTERNAL MEDICINE

## 2021-03-29 PROCEDURE — 1159F PR MEDICATION LIST DOCUMENTED IN MEDICAL RECORD: ICD-10-PCS | Mod: S$GLB,,, | Performed by: INTERNAL MEDICINE

## 2021-03-29 PROCEDURE — 3078F PR MOST RECENT DIASTOLIC BLOOD PRESSURE < 80 MM HG: ICD-10-PCS | Mod: CPTII,S$GLB,, | Performed by: NURSE PRACTITIONER

## 2021-03-29 PROCEDURE — 3078F PR MOST RECENT DIASTOLIC BLOOD PRESSURE < 80 MM HG: ICD-10-PCS | Mod: CPTII,S$GLB,, | Performed by: INTERNAL MEDICINE

## 2021-03-29 PROCEDURE — 3074F PR MOST RECENT SYSTOLIC BLOOD PRESSURE < 130 MM HG: ICD-10-PCS | Mod: CPTII,S$GLB,, | Performed by: INTERNAL MEDICINE

## 2021-03-29 PROCEDURE — 3288F PR FALLS RISK ASSESSMENT DOCUMENTED: ICD-10-PCS | Mod: CPTII,S$GLB,, | Performed by: INTERNAL MEDICINE

## 2021-03-29 PROCEDURE — 99999 PR PBB SHADOW E&M-EST. PATIENT-LVL V: CPT | Mod: PBBFAC,,, | Performed by: NURSE PRACTITIONER

## 2021-03-29 PROCEDURE — 1100F PTFALLS ASSESS-DOCD GE2>/YR: CPT | Mod: CPTII,S$GLB,, | Performed by: INTERNAL MEDICINE

## 2021-03-29 PROCEDURE — 3074F SYST BP LT 130 MM HG: CPT | Mod: CPTII,S$GLB,, | Performed by: NURSE PRACTITIONER

## 2021-03-29 PROCEDURE — 99999 PR PBB SHADOW E&M-EST. PATIENT-LVL V: ICD-10-PCS | Mod: PBBFAC,,, | Performed by: INTERNAL MEDICINE

## 2021-03-29 PROCEDURE — 99204 PR OFFICE/OUTPT VISIT, NEW, LEVL IV, 45-59 MIN: ICD-10-PCS | Mod: S$GLB,,, | Performed by: NURSE PRACTITIONER

## 2021-03-29 PROCEDURE — 1126F PR PAIN SEVERITY QUANTIFIED, NO PAIN PRESENT: ICD-10-PCS | Mod: S$GLB,,, | Performed by: NURSE PRACTITIONER

## 2021-03-29 PROCEDURE — 1159F PR MEDICATION LIST DOCUMENTED IN MEDICAL RECORD: ICD-10-PCS | Mod: S$GLB,,, | Performed by: NURSE PRACTITIONER

## 2021-03-29 RX ORDER — GABAPENTIN 100 MG/1
100 CAPSULE ORAL 2 TIMES DAILY
Qty: 180 CAPSULE | Refills: 3 | Status: SHIPPED | OUTPATIENT
Start: 2021-03-29 | End: 2021-10-13 | Stop reason: ALTCHOICE

## 2021-03-30 ENCOUNTER — TELEPHONE (OUTPATIENT)
Dept: NEUROSURGERY | Facility: CLINIC | Age: 76
End: 2021-03-30

## 2021-03-30 ENCOUNTER — TELEPHONE (OUTPATIENT)
Dept: RADIOLOGY | Facility: HOSPITAL | Age: 76
End: 2021-03-30

## 2021-03-31 ENCOUNTER — TELEPHONE (OUTPATIENT)
Dept: RADIOLOGY | Facility: HOSPITAL | Age: 76
End: 2021-03-31

## 2021-04-01 ENCOUNTER — IMMUNIZATION (OUTPATIENT)
Dept: PHARMACY | Facility: CLINIC | Age: 76
End: 2021-04-01
Payer: MEDICARE

## 2021-04-01 ENCOUNTER — NURSE TRIAGE (OUTPATIENT)
Dept: ADMINISTRATIVE | Facility: CLINIC | Age: 76
End: 2021-04-01

## 2021-04-01 ENCOUNTER — HOSPITAL ENCOUNTER (OUTPATIENT)
Dept: RADIOLOGY | Facility: HOSPITAL | Age: 76
Discharge: HOME | End: 2021-04-01
Attending: NURSE PRACTITIONER
Payer: MEDICARE

## 2021-04-01 ENCOUNTER — PROCEDURE VISIT (OUTPATIENT)
Dept: HEPATOLOGY | Facility: CLINIC | Age: 76
End: 2021-04-01
Attending: NURSE PRACTITIONER
Payer: MEDICARE

## 2021-04-01 VITALS
HEIGHT: 64 IN | DIASTOLIC BLOOD PRESSURE: 76 MMHG | WEIGHT: 143.94 LBS | SYSTOLIC BLOOD PRESSURE: 140 MMHG | HEART RATE: 77 BPM | BODY MASS INDEX: 24.57 KG/M2

## 2021-04-01 DIAGNOSIS — Z23 NEED FOR HEPATITIS A AND B VACCINATION: ICD-10-CM

## 2021-04-01 DIAGNOSIS — K76.0 FATTY LIVER: ICD-10-CM

## 2021-04-01 PROCEDURE — 76705 ECHO EXAM OF ABDOMEN: CPT | Mod: 26,,, | Performed by: RADIOLOGY

## 2021-04-01 PROCEDURE — 91200 PR LIVER ELASTOGRAPHY W/OUT IMAG W/INTERP & REPORT: ICD-10-PCS | Mod: S$GLB,,, | Performed by: NURSE PRACTITIONER

## 2021-04-01 PROCEDURE — 76705 ECHO EXAM OF ABDOMEN: CPT | Mod: TC

## 2021-04-01 PROCEDURE — 76705 US ABDOMEN LIMITED: ICD-10-PCS | Mod: 26,,, | Performed by: RADIOLOGY

## 2021-04-01 PROCEDURE — 91200 LIVER ELASTOGRAPHY: CPT | Mod: S$GLB,,, | Performed by: NURSE PRACTITIONER

## 2021-04-07 ENCOUNTER — TELEPHONE (OUTPATIENT)
Dept: HEPATOLOGY | Facility: CLINIC | Age: 76
End: 2021-04-07

## 2021-04-15 ENCOUNTER — PATIENT OUTREACH (OUTPATIENT)
Dept: ADMINISTRATIVE | Facility: OTHER | Age: 76
End: 2021-04-15

## 2021-04-16 ENCOUNTER — OFFICE VISIT (OUTPATIENT)
Dept: ORTHOPEDICS | Facility: CLINIC | Age: 76
End: 2021-04-16
Payer: MEDICARE

## 2021-04-16 VITALS
SYSTOLIC BLOOD PRESSURE: 108 MMHG | BODY MASS INDEX: 24.41 KG/M2 | DIASTOLIC BLOOD PRESSURE: 75 MMHG | WEIGHT: 143 LBS | HEIGHT: 64 IN | HEART RATE: 72 BPM

## 2021-04-16 DIAGNOSIS — G56.03 CARPAL TUNNEL SYNDROME ON BOTH SIDES: ICD-10-CM

## 2021-04-16 DIAGNOSIS — M18.0 ARTHRITIS OF CARPOMETACARPAL (CMC) JOINT OF BOTH THUMBS: Primary | ICD-10-CM

## 2021-04-16 PROCEDURE — 1125F PR PAIN SEVERITY QUANTIFIED, PAIN PRESENT: ICD-10-PCS | Mod: S$GLB,,, | Performed by: ORTHOPAEDIC SURGERY

## 2021-04-16 PROCEDURE — 99213 OFFICE O/P EST LOW 20 MIN: CPT | Mod: 25,S$GLB,, | Performed by: ORTHOPAEDIC SURGERY

## 2021-04-16 PROCEDURE — 20526 THER INJECTION CARP TUNNEL: CPT | Mod: 59,50,S$GLB, | Performed by: ORTHOPAEDIC SURGERY

## 2021-04-16 PROCEDURE — 20526 CARPAL TUNNEL: ICD-10-PCS | Mod: 59,50,S$GLB, | Performed by: ORTHOPAEDIC SURGERY

## 2021-04-16 PROCEDURE — 20600 SMALL JOINT ASPIRATION/INJECTION: R THUMB CMC: ICD-10-PCS | Mod: 51,RT,S$GLB, | Performed by: ORTHOPAEDIC SURGERY

## 2021-04-16 PROCEDURE — 99213 PR OFFICE/OUTPT VISIT, EST, LEVL III, 20-29 MIN: ICD-10-PCS | Mod: 25,S$GLB,, | Performed by: ORTHOPAEDIC SURGERY

## 2021-04-16 PROCEDURE — 99999 PR PBB SHADOW E&M-EST. PATIENT-LVL V: CPT | Mod: PBBFAC,,, | Performed by: ORTHOPAEDIC SURGERY

## 2021-04-16 PROCEDURE — 20600 DRAIN/INJ JOINT/BURSA W/O US: CPT | Mod: 51,RT,S$GLB, | Performed by: ORTHOPAEDIC SURGERY

## 2021-04-16 PROCEDURE — 1101F PT FALLS ASSESS-DOCD LE1/YR: CPT | Mod: CPTII,S$GLB,, | Performed by: ORTHOPAEDIC SURGERY

## 2021-04-16 PROCEDURE — 1101F PR PT FALLS ASSESS DOC 0-1 FALLS W/OUT INJ PAST YR: ICD-10-PCS | Mod: CPTII,S$GLB,, | Performed by: ORTHOPAEDIC SURGERY

## 2021-04-16 PROCEDURE — 1125F AMNT PAIN NOTED PAIN PRSNT: CPT | Mod: S$GLB,,, | Performed by: ORTHOPAEDIC SURGERY

## 2021-04-16 PROCEDURE — 3288F FALL RISK ASSESSMENT DOCD: CPT | Mod: CPTII,S$GLB,, | Performed by: ORTHOPAEDIC SURGERY

## 2021-04-16 PROCEDURE — 1159F PR MEDICATION LIST DOCUMENTED IN MEDICAL RECORD: ICD-10-PCS | Mod: S$GLB,,, | Performed by: ORTHOPAEDIC SURGERY

## 2021-04-16 PROCEDURE — 99999 PR PBB SHADOW E&M-EST. PATIENT-LVL V: ICD-10-PCS | Mod: PBBFAC,,, | Performed by: ORTHOPAEDIC SURGERY

## 2021-04-16 PROCEDURE — 1159F MED LIST DOCD IN RCRD: CPT | Mod: S$GLB,,, | Performed by: ORTHOPAEDIC SURGERY

## 2021-04-16 PROCEDURE — 3288F PR FALLS RISK ASSESSMENT DOCUMENTED: ICD-10-PCS | Mod: CPTII,S$GLB,, | Performed by: ORTHOPAEDIC SURGERY

## 2021-04-16 RX ORDER — TRIAMCINOLONE ACETONIDE 40 MG/ML
40 INJECTION, SUSPENSION INTRA-ARTICULAR; INTRAMUSCULAR
Status: DISCONTINUED | OUTPATIENT
Start: 2021-04-16 | End: 2021-04-16 | Stop reason: HOSPADM

## 2021-04-16 RX ORDER — TRIAMCINOLONE ACETONIDE 40 MG/ML
20 INJECTION, SUSPENSION INTRA-ARTICULAR; INTRAMUSCULAR
Status: DISCONTINUED | OUTPATIENT
Start: 2021-04-16 | End: 2021-04-16 | Stop reason: HOSPADM

## 2021-04-16 RX ADMIN — TRIAMCINOLONE ACETONIDE 40 MG: 40 INJECTION, SUSPENSION INTRA-ARTICULAR; INTRAMUSCULAR at 09:04

## 2021-04-16 RX ADMIN — TRIAMCINOLONE ACETONIDE 20 MG: 40 INJECTION, SUSPENSION INTRA-ARTICULAR; INTRAMUSCULAR at 09:04

## 2021-04-23 ENCOUNTER — TELEPHONE (OUTPATIENT)
Dept: NEUROSURGERY | Facility: CLINIC | Age: 76
End: 2021-04-23

## 2021-04-26 ENCOUNTER — PATIENT MESSAGE (OUTPATIENT)
Dept: HEMATOLOGY/ONCOLOGY | Facility: CLINIC | Age: 76
End: 2021-04-26

## 2021-04-27 ENCOUNTER — LAB VISIT (OUTPATIENT)
Dept: LAB | Facility: HOSPITAL | Age: 76
End: 2021-04-27
Attending: NURSE PRACTITIONER
Payer: MEDICARE

## 2021-04-27 DIAGNOSIS — D64.9 ANEMIA, UNSPECIFIED TYPE: ICD-10-CM

## 2021-04-27 LAB
ALBUMIN SERPL BCP-MCNC: 3.4 G/DL (ref 3.5–5.2)
ALP SERPL-CCNC: 52 U/L (ref 55–135)
ALT SERPL W/O P-5'-P-CCNC: 11 U/L (ref 10–44)
ANION GAP SERPL CALC-SCNC: 8 MMOL/L (ref 8–16)
AST SERPL-CCNC: 15 U/L (ref 10–40)
BASOPHILS # BLD AUTO: 0.03 K/UL (ref 0–0.2)
BASOPHILS NFR BLD: 0.5 % (ref 0–1.9)
BILIRUB SERPL-MCNC: 0.3 MG/DL (ref 0.1–1)
BUN SERPL-MCNC: 21 MG/DL (ref 8–23)
CALCIUM SERPL-MCNC: 9.4 MG/DL (ref 8.7–10.5)
CHLORIDE SERPL-SCNC: 110 MMOL/L (ref 95–110)
CO2 SERPL-SCNC: 18 MMOL/L (ref 23–29)
CREAT SERPL-MCNC: 0.9 MG/DL (ref 0.5–1.4)
DIFFERENTIAL METHOD: NORMAL
EOSINOPHIL # BLD AUTO: 0.1 K/UL (ref 0–0.5)
EOSINOPHIL NFR BLD: 1.4 % (ref 0–8)
ERYTHROCYTE [DISTWIDTH] IN BLOOD BY AUTOMATED COUNT: 13.8 % (ref 11.5–14.5)
EST. GFR  (AFRICAN AMERICAN): >60 ML/MIN/1.73 M^2
EST. GFR  (NON AFRICAN AMERICAN): >60 ML/MIN/1.73 M^2
FERRITIN SERPL-MCNC: 137 NG/ML (ref 20–300)
GLUCOSE SERPL-MCNC: 106 MG/DL (ref 70–110)
HCT VFR BLD AUTO: 37.8 % (ref 37–48.5)
HGB BLD-MCNC: 12.4 G/DL (ref 12–16)
IMM GRANULOCYTES # BLD AUTO: 0.03 K/UL (ref 0–0.04)
IMM GRANULOCYTES NFR BLD AUTO: 0.5 % (ref 0–0.5)
IRON SERPL-MCNC: 45 UG/DL (ref 30–160)
LYMPHOCYTES # BLD AUTO: 1.7 K/UL (ref 1–4.8)
LYMPHOCYTES NFR BLD: 27 % (ref 18–48)
MCH RBC QN AUTO: 30.7 PG (ref 27–31)
MCHC RBC AUTO-ENTMCNC: 32.8 G/DL (ref 32–36)
MCV RBC AUTO: 94 FL (ref 82–98)
MONOCYTES # BLD AUTO: 0.5 K/UL (ref 0.3–1)
MONOCYTES NFR BLD: 8.2 % (ref 4–15)
NEUTROPHILS # BLD AUTO: 4 K/UL (ref 1.8–7.7)
NEUTROPHILS NFR BLD: 62.9 % (ref 38–73)
NRBC BLD-RTO: 0 /100 WBC
PLATELET # BLD AUTO: 210 K/UL (ref 150–450)
PMV BLD AUTO: 9.8 FL (ref 9.2–12.9)
POTASSIUM SERPL-SCNC: 3.6 MMOL/L (ref 3.5–5.1)
PROT SERPL-MCNC: 7.3 G/DL (ref 6–8.4)
RBC # BLD AUTO: 4.04 M/UL (ref 4–5.4)
SATURATED IRON: 15 % (ref 20–50)
SODIUM SERPL-SCNC: 136 MMOL/L (ref 136–145)
TOTAL IRON BINDING CAPACITY: 295 UG/DL (ref 250–450)
TRANSFERRIN SERPL-MCNC: 199 MG/DL (ref 200–375)
WBC # BLD AUTO: 6.36 K/UL (ref 3.9–12.7)

## 2021-04-27 PROCEDURE — 84165 PROTEIN E-PHORESIS SERUM: CPT | Performed by: NURSE PRACTITIONER

## 2021-04-27 PROCEDURE — 86334 IMMUNOFIX E-PHORESIS SERUM: CPT | Performed by: NURSE PRACTITIONER

## 2021-04-27 PROCEDURE — 80053 COMPREHEN METABOLIC PANEL: CPT | Performed by: NURSE PRACTITIONER

## 2021-04-27 PROCEDURE — 86334 IMMUNOFIX E-PHORESIS SERUM: CPT | Mod: 26,,, | Performed by: PATHOLOGY

## 2021-04-27 PROCEDURE — 86334 PATHOLOGIST INTERPRETATION IFE: ICD-10-PCS | Mod: 26,,, | Performed by: PATHOLOGY

## 2021-04-27 PROCEDURE — 36415 COLL VENOUS BLD VENIPUNCTURE: CPT | Mod: PO | Performed by: NURSE PRACTITIONER

## 2021-04-27 PROCEDURE — 83540 ASSAY OF IRON: CPT | Performed by: NURSE PRACTITIONER

## 2021-04-27 PROCEDURE — 84165 PATHOLOGIST INTERPRETATION SPE: ICD-10-PCS | Mod: 26,,, | Performed by: PATHOLOGY

## 2021-04-27 PROCEDURE — 85025 COMPLETE CBC W/AUTO DIFF WBC: CPT | Mod: PO | Performed by: NURSE PRACTITIONER

## 2021-04-27 PROCEDURE — 84165 PROTEIN E-PHORESIS SERUM: CPT | Mod: 26,,, | Performed by: PATHOLOGY

## 2021-04-27 PROCEDURE — 82728 ASSAY OF FERRITIN: CPT | Performed by: NURSE PRACTITIONER

## 2021-04-27 PROCEDURE — 83520 IMMUNOASSAY QUANT NOS NONAB: CPT | Mod: 59 | Performed by: NURSE PRACTITIONER

## 2021-04-28 LAB
ALBUMIN SERPL ELPH-MCNC: 3.6 G/DL (ref 3.35–5.55)
ALPHA1 GLOB SERPL ELPH-MCNC: 0.3 G/DL (ref 0.17–0.41)
ALPHA2 GLOB SERPL ELPH-MCNC: 0.88 G/DL (ref 0.43–0.99)
B-GLOBULIN SERPL ELPH-MCNC: 0.9 G/DL (ref 0.5–1.1)
GAMMA GLOB SERPL ELPH-MCNC: 1.21 G/DL (ref 0.67–1.58)
INTERPRETATION SERPL IFE-IMP: NORMAL
KAPPA LC SER QL IA: 3.38 MG/DL (ref 0.33–1.94)
KAPPA LC/LAMBDA SER IA: 1.37 (ref 0.26–1.65)
LAMBDA LC SER QL IA: 2.46 MG/DL (ref 0.57–2.63)
PATHOLOGIST INTERPRETATION IFE: NORMAL
PATHOLOGIST INTERPRETATION SPE: NORMAL
PROT SERPL-MCNC: 6.9 G/DL (ref 6–8.4)

## 2021-04-30 ENCOUNTER — TELEPHONE (OUTPATIENT)
Dept: INTERNAL MEDICINE | Facility: CLINIC | Age: 76
End: 2021-04-30

## 2021-05-03 ENCOUNTER — TELEPHONE (OUTPATIENT)
Dept: INTERNAL MEDICINE | Facility: CLINIC | Age: 76
End: 2021-05-03

## 2021-05-03 DIAGNOSIS — J30.9 CHRONIC ALLERGIC RHINITIS: Primary | ICD-10-CM

## 2021-05-10 ENCOUNTER — IMMUNIZATION (OUTPATIENT)
Dept: PHARMACY | Facility: CLINIC | Age: 76
End: 2021-05-10
Payer: MEDICARE

## 2021-05-10 ENCOUNTER — OFFICE VISIT (OUTPATIENT)
Dept: OTOLARYNGOLOGY | Facility: CLINIC | Age: 76
End: 2021-05-10
Payer: MEDICARE

## 2021-05-10 ENCOUNTER — OFFICE VISIT (OUTPATIENT)
Dept: PAIN MEDICINE | Facility: CLINIC | Age: 76
End: 2021-05-10
Payer: MEDICARE

## 2021-05-10 ENCOUNTER — OFFICE VISIT (OUTPATIENT)
Dept: HEMATOLOGY/ONCOLOGY | Facility: CLINIC | Age: 76
End: 2021-05-10
Payer: MEDICARE

## 2021-05-10 VITALS
DIASTOLIC BLOOD PRESSURE: 75 MMHG | HEART RATE: 76 BPM | SYSTOLIC BLOOD PRESSURE: 138 MMHG | OXYGEN SATURATION: 99 % | TEMPERATURE: 97 F | WEIGHT: 141.13 LBS | HEIGHT: 63 IN | BODY MASS INDEX: 25.01 KG/M2

## 2021-05-10 VITALS
BODY MASS INDEX: 24.98 KG/M2 | HEIGHT: 63 IN | SYSTOLIC BLOOD PRESSURE: 156 MMHG | HEART RATE: 75 BPM | RESPIRATION RATE: 18 BRPM | WEIGHT: 141 LBS | DIASTOLIC BLOOD PRESSURE: 84 MMHG

## 2021-05-10 VITALS
DIASTOLIC BLOOD PRESSURE: 73 MMHG | HEIGHT: 63 IN | BODY MASS INDEX: 24.99 KG/M2 | SYSTOLIC BLOOD PRESSURE: 139 MMHG | HEART RATE: 79 BPM | TEMPERATURE: 97 F

## 2021-05-10 DIAGNOSIS — D64.9 ANEMIA, UNSPECIFIED TYPE: Primary | ICD-10-CM

## 2021-05-10 DIAGNOSIS — J30.9 CHRONIC ALLERGIC RHINITIS: ICD-10-CM

## 2021-05-10 DIAGNOSIS — M43.16 SPONDYLOLISTHESIS, LUMBAR REGION: ICD-10-CM

## 2021-05-10 DIAGNOSIS — M47.812 CERVICAL SPONDYLOSIS: ICD-10-CM

## 2021-05-10 DIAGNOSIS — M47.812 CERVICAL FACET JOINT SYNDROME: Primary | ICD-10-CM

## 2021-05-10 DIAGNOSIS — J34.89 NASAL SEPTAL PERFORATION: ICD-10-CM

## 2021-05-10 DIAGNOSIS — S32.020A COMPRESSION FRACTURE OF L2 VERTEBRA, INITIAL ENCOUNTER: ICD-10-CM

## 2021-05-10 DIAGNOSIS — D50.8 OTHER IRON DEFICIENCY ANEMIA: ICD-10-CM

## 2021-05-10 DIAGNOSIS — J32.9 RHINOSINUSITIS: ICD-10-CM

## 2021-05-10 DIAGNOSIS — M48.062 LUMBAR STENOSIS WITH NEUROGENIC CLAUDICATION: ICD-10-CM

## 2021-05-10 DIAGNOSIS — H66.91 RIGHT OTITIS MEDIA, UNSPECIFIED OTITIS MEDIA TYPE: Primary | ICD-10-CM

## 2021-05-10 PROCEDURE — 99999 PR PBB SHADOW E&M-EST. PATIENT-LVL V: ICD-10-PCS | Mod: PBBFAC,,, | Performed by: NURSE PRACTITIONER

## 2021-05-10 PROCEDURE — 99999 PR PBB SHADOW E&M-EST. PATIENT-LVL V: CPT | Mod: PBBFAC,,, | Performed by: NURSE PRACTITIONER

## 2021-05-10 PROCEDURE — 99214 OFFICE O/P EST MOD 30 MIN: CPT | Mod: S$GLB,,, | Performed by: ANESTHESIOLOGY

## 2021-05-10 PROCEDURE — 1125F PR PAIN SEVERITY QUANTIFIED, PAIN PRESENT: ICD-10-PCS | Mod: S$GLB,,, | Performed by: ANESTHESIOLOGY

## 2021-05-10 PROCEDURE — 99999 PR PBB SHADOW E&M-EST. PATIENT-LVL V: ICD-10-PCS | Mod: PBBFAC,,, | Performed by: PHYSICIAN ASSISTANT

## 2021-05-10 PROCEDURE — 99214 OFFICE O/P EST MOD 30 MIN: CPT | Mod: S$GLB,,, | Performed by: PHYSICIAN ASSISTANT

## 2021-05-10 PROCEDURE — 1125F PR PAIN SEVERITY QUANTIFIED, PAIN PRESENT: ICD-10-PCS | Mod: S$GLB,,, | Performed by: NURSE PRACTITIONER

## 2021-05-10 PROCEDURE — 99999 PR PBB SHADOW E&M-EST. PATIENT-LVL V: ICD-10-PCS | Mod: PBBFAC,,, | Performed by: ANESTHESIOLOGY

## 2021-05-10 PROCEDURE — 99214 PR OFFICE/OUTPT VISIT, EST, LEVL IV, 30-39 MIN: ICD-10-PCS | Mod: S$GLB,,, | Performed by: ANESTHESIOLOGY

## 2021-05-10 PROCEDURE — 1125F AMNT PAIN NOTED PAIN PRSNT: CPT | Mod: S$GLB,,, | Performed by: ANESTHESIOLOGY

## 2021-05-10 PROCEDURE — 3288F PR FALLS RISK ASSESSMENT DOCUMENTED: ICD-10-PCS | Mod: CPTII,S$GLB,, | Performed by: PHYSICIAN ASSISTANT

## 2021-05-10 PROCEDURE — 99999 PR PBB SHADOW E&M-EST. PATIENT-LVL V: CPT | Mod: PBBFAC,,, | Performed by: ANESTHESIOLOGY

## 2021-05-10 PROCEDURE — 3288F FALL RISK ASSESSMENT DOCD: CPT | Mod: CPTII,S$GLB,, | Performed by: PHYSICIAN ASSISTANT

## 2021-05-10 PROCEDURE — 99214 PR OFFICE/OUTPT VISIT, EST, LEVL IV, 30-39 MIN: ICD-10-PCS | Mod: S$GLB,,, | Performed by: NURSE PRACTITIONER

## 2021-05-10 PROCEDURE — 99999 PR PBB SHADOW E&M-EST. PATIENT-LVL V: CPT | Mod: PBBFAC,,, | Performed by: PHYSICIAN ASSISTANT

## 2021-05-10 PROCEDURE — 1125F AMNT PAIN NOTED PAIN PRSNT: CPT | Mod: S$GLB,,, | Performed by: PHYSICIAN ASSISTANT

## 2021-05-10 PROCEDURE — 1159F PR MEDICATION LIST DOCUMENTED IN MEDICAL RECORD: ICD-10-PCS | Mod: S$GLB,,, | Performed by: PHYSICIAN ASSISTANT

## 2021-05-10 PROCEDURE — 99214 PR OFFICE/OUTPT VISIT, EST, LEVL IV, 30-39 MIN: ICD-10-PCS | Mod: S$GLB,,, | Performed by: PHYSICIAN ASSISTANT

## 2021-05-10 PROCEDURE — 1101F PT FALLS ASSESS-DOCD LE1/YR: CPT | Mod: CPTII,S$GLB,, | Performed by: PHYSICIAN ASSISTANT

## 2021-05-10 PROCEDURE — 1159F MED LIST DOCD IN RCRD: CPT | Mod: S$GLB,,, | Performed by: ANESTHESIOLOGY

## 2021-05-10 PROCEDURE — 3288F PR FALLS RISK ASSESSMENT DOCUMENTED: ICD-10-PCS | Mod: CPTII,S$GLB,, | Performed by: NURSE PRACTITIONER

## 2021-05-10 PROCEDURE — 1101F PR PT FALLS ASSESS DOC 0-1 FALLS W/OUT INJ PAST YR: ICD-10-PCS | Mod: CPTII,S$GLB,, | Performed by: PHYSICIAN ASSISTANT

## 2021-05-10 PROCEDURE — 1101F PR PT FALLS ASSESS DOC 0-1 FALLS W/OUT INJ PAST YR: ICD-10-PCS | Mod: CPTII,S$GLB,, | Performed by: NURSE PRACTITIONER

## 2021-05-10 PROCEDURE — 1125F PR PAIN SEVERITY QUANTIFIED, PAIN PRESENT: ICD-10-PCS | Mod: S$GLB,,, | Performed by: PHYSICIAN ASSISTANT

## 2021-05-10 PROCEDURE — 1159F PR MEDICATION LIST DOCUMENTED IN MEDICAL RECORD: ICD-10-PCS | Mod: S$GLB,,, | Performed by: NURSE PRACTITIONER

## 2021-05-10 PROCEDURE — 1159F PR MEDICATION LIST DOCUMENTED IN MEDICAL RECORD: ICD-10-PCS | Mod: S$GLB,,, | Performed by: ANESTHESIOLOGY

## 2021-05-10 PROCEDURE — 99214 OFFICE O/P EST MOD 30 MIN: CPT | Mod: S$GLB,,, | Performed by: NURSE PRACTITIONER

## 2021-05-10 PROCEDURE — 3288F FALL RISK ASSESSMENT DOCD: CPT | Mod: CPTII,S$GLB,, | Performed by: NURSE PRACTITIONER

## 2021-05-10 PROCEDURE — 1159F MED LIST DOCD IN RCRD: CPT | Mod: S$GLB,,, | Performed by: PHYSICIAN ASSISTANT

## 2021-05-10 PROCEDURE — 1101F PT FALLS ASSESS-DOCD LE1/YR: CPT | Mod: CPTII,S$GLB,, | Performed by: NURSE PRACTITIONER

## 2021-05-10 PROCEDURE — 1159F MED LIST DOCD IN RCRD: CPT | Mod: S$GLB,,, | Performed by: NURSE PRACTITIONER

## 2021-05-10 PROCEDURE — 1125F AMNT PAIN NOTED PAIN PRSNT: CPT | Mod: S$GLB,,, | Performed by: NURSE PRACTITIONER

## 2021-05-10 RX ORDER — LEVOFLOXACIN 500 MG/1
500 TABLET, FILM COATED ORAL DAILY
Qty: 10 TABLET | Refills: 0 | Status: SHIPPED | OUTPATIENT
Start: 2021-05-10 | End: 2021-05-20

## 2021-05-10 RX ORDER — METHYLPREDNISOLONE SOD SUCC 125 MG
80 VIAL (EA) INJECTION
Status: CANCELLED
Start: 2021-05-10

## 2021-05-10 RX ORDER — TIZANIDINE 4 MG/1
4 TABLET ORAL 2 TIMES DAILY PRN
Qty: 60 TABLET | Refills: 0 | Status: SHIPPED | OUTPATIENT
Start: 2021-05-10 | End: 2021-07-20

## 2021-05-10 RX ORDER — MUPIROCIN 20 MG/G
OINTMENT TOPICAL 2 TIMES DAILY
Qty: 15 G | Refills: 1 | Status: SHIPPED | OUTPATIENT
Start: 2021-05-10 | End: 2022-01-16 | Stop reason: ALTCHOICE

## 2021-05-10 RX ORDER — PREDNISONE 10 MG/1
TABLET ORAL
Qty: 13 TABLET | Refills: 0 | Status: SHIPPED | OUTPATIENT
Start: 2021-05-10 | End: 2021-07-20

## 2021-05-10 RX ORDER — SODIUM CHLORIDE 0.9 % (FLUSH) 0.9 %
10 SYRINGE (ML) INJECTION
OUTPATIENT
Start: 2021-05-22

## 2021-05-10 RX ORDER — HEPARIN 100 UNIT/ML
500 SYRINGE INTRAVENOUS
Status: CANCELLED | OUTPATIENT
Start: 2021-05-10

## 2021-05-10 RX ORDER — SODIUM CHLORIDE 0.9 % (FLUSH) 0.9 %
10 SYRINGE (ML) INJECTION
Status: CANCELLED | OUTPATIENT
Start: 2021-05-10

## 2021-05-10 RX ORDER — HEPARIN 100 UNIT/ML
500 SYRINGE INTRAVENOUS
OUTPATIENT
Start: 2021-05-22

## 2021-05-10 RX ORDER — METHYLPREDNISOLONE SOD SUCC 125 MG
80 VIAL (EA) INJECTION
Start: 2021-05-22

## 2021-05-17 ENCOUNTER — PATIENT OUTREACH (OUTPATIENT)
Dept: ADMINISTRATIVE | Facility: OTHER | Age: 76
End: 2021-05-17

## 2021-05-19 ENCOUNTER — OFFICE VISIT (OUTPATIENT)
Dept: CARDIOLOGY | Facility: CLINIC | Age: 76
End: 2021-05-19
Attending: INTERNAL MEDICINE
Payer: MEDICARE

## 2021-05-19 ENCOUNTER — HOSPITAL ENCOUNTER (OUTPATIENT)
Dept: CARDIOLOGY | Facility: HOSPITAL | Age: 76
Discharge: HOME | End: 2021-05-19
Attending: INTERNAL MEDICINE
Payer: MEDICARE

## 2021-05-19 VITALS
BODY MASS INDEX: 24.99 KG/M2 | DIASTOLIC BLOOD PRESSURE: 78 MMHG | HEART RATE: 77 BPM | OXYGEN SATURATION: 99 % | SYSTOLIC BLOOD PRESSURE: 142 MMHG | WEIGHT: 141.13 LBS

## 2021-05-19 DIAGNOSIS — Z86.73 HISTORY OF STROKE: ICD-10-CM

## 2021-05-19 DIAGNOSIS — I15.2 HYPERTENSION ASSOCIATED WITH DIABETES: ICD-10-CM

## 2021-05-19 DIAGNOSIS — E11.59 HYPERTENSION ASSOCIATED WITH DIABETES: ICD-10-CM

## 2021-05-19 DIAGNOSIS — I25.10 CAD IN NATIVE ARTERY: ICD-10-CM

## 2021-05-19 DIAGNOSIS — R00.2 PALPITATIONS: ICD-10-CM

## 2021-05-19 DIAGNOSIS — I25.118 CORONARY ARTERY DISEASE OF NATIVE ARTERY OF NATIVE HEART WITH STABLE ANGINA PECTORIS: Primary | ICD-10-CM

## 2021-05-19 DIAGNOSIS — R53.81 DEBILITY: ICD-10-CM

## 2021-05-19 DIAGNOSIS — I25.10 CORONARY ARTERY DISEASE, ANGINA PRESENCE UNSPECIFIED, UNSPECIFIED VESSEL OR LESION TYPE, UNSPECIFIED WHETHER NATIVE OR TRANSPLANTED HEART: ICD-10-CM

## 2021-05-19 DIAGNOSIS — I10 ESSENTIAL HYPERTENSION: ICD-10-CM

## 2021-05-19 PROCEDURE — 99215 OFFICE O/P EST HI 40 MIN: CPT | Mod: S$GLB,,, | Performed by: INTERNAL MEDICINE

## 2021-05-19 PROCEDURE — 1159F PR MEDICATION LIST DOCUMENTED IN MEDICAL RECORD: ICD-10-PCS | Mod: S$GLB,,, | Performed by: INTERNAL MEDICINE

## 2021-05-19 PROCEDURE — 99999 PR PBB SHADOW E&M-EST. PATIENT-LVL V: CPT | Mod: PBBFAC,,, | Performed by: INTERNAL MEDICINE

## 2021-05-19 PROCEDURE — 1126F AMNT PAIN NOTED NONE PRSNT: CPT | Mod: S$GLB,,, | Performed by: INTERNAL MEDICINE

## 2021-05-19 PROCEDURE — 93005 ELECTROCARDIOGRAM TRACING: CPT

## 2021-05-19 PROCEDURE — 93010 EKG 12-LEAD: ICD-10-PCS | Mod: ,,, | Performed by: INTERNAL MEDICINE

## 2021-05-19 PROCEDURE — 99215 PR OFFICE/OUTPT VISIT, EST, LEVL V, 40-54 MIN: ICD-10-PCS | Mod: S$GLB,,, | Performed by: INTERNAL MEDICINE

## 2021-05-19 PROCEDURE — 1126F PR PAIN SEVERITY QUANTIFIED, NO PAIN PRESENT: ICD-10-PCS | Mod: S$GLB,,, | Performed by: INTERNAL MEDICINE

## 2021-05-19 PROCEDURE — 1159F MED LIST DOCD IN RCRD: CPT | Mod: S$GLB,,, | Performed by: INTERNAL MEDICINE

## 2021-05-19 PROCEDURE — 93010 ELECTROCARDIOGRAM REPORT: CPT | Mod: ,,, | Performed by: INTERNAL MEDICINE

## 2021-05-19 PROCEDURE — 99999 PR PBB SHADOW E&M-EST. PATIENT-LVL V: ICD-10-PCS | Mod: PBBFAC,,, | Performed by: INTERNAL MEDICINE

## 2021-05-19 RX ORDER — HYDROCHLOROTHIAZIDE 12.5 MG/1
12.5 TABLET ORAL DAILY PRN
Qty: 30 TABLET | Refills: 3 | Status: SHIPPED | OUTPATIENT
Start: 2021-05-19 | End: 2021-09-10 | Stop reason: SDUPTHER

## 2021-05-19 RX ORDER — NITROGLYCERIN 0.4 MG/1
0.4 TABLET SUBLINGUAL EVERY 5 MIN PRN
Qty: 30 TABLET | Refills: 0 | Status: SHIPPED | OUTPATIENT
Start: 2021-05-19 | End: 2021-06-16

## 2021-05-21 ENCOUNTER — INFUSION (OUTPATIENT)
Dept: INFUSION THERAPY | Facility: HOSPITAL | Age: 76
End: 2021-05-21
Attending: NURSE PRACTITIONER
Payer: MEDICARE

## 2021-05-21 VITALS
DIASTOLIC BLOOD PRESSURE: 75 MMHG | SYSTOLIC BLOOD PRESSURE: 144 MMHG | WEIGHT: 141.13 LBS | HEART RATE: 79 BPM | BODY MASS INDEX: 25.01 KG/M2 | HEIGHT: 63 IN | OXYGEN SATURATION: 99 % | RESPIRATION RATE: 18 BRPM | TEMPERATURE: 98 F

## 2021-05-21 DIAGNOSIS — I25.118 CORONARY ARTERY DISEASE OF NATIVE ARTERY OF NATIVE HEART WITH STABLE ANGINA PECTORIS: ICD-10-CM

## 2021-05-21 DIAGNOSIS — I25.118 CORONARY ARTERY DISEASE OF NATIVE ARTERY OF NATIVE HEART WITH STABLE ANGINA PECTORIS: Primary | ICD-10-CM

## 2021-05-21 DIAGNOSIS — D50.8 OTHER IRON DEFICIENCY ANEMIA: Primary | ICD-10-CM

## 2021-05-21 PROCEDURE — 63600175 PHARM REV CODE 636 W HCPCS: Performed by: NURSE PRACTITIONER

## 2021-05-21 PROCEDURE — 96365 THER/PROPH/DIAG IV INF INIT: CPT

## 2021-05-21 PROCEDURE — 96375 TX/PRO/DX INJ NEW DRUG ADDON: CPT

## 2021-05-21 PROCEDURE — 25000003 PHARM REV CODE 250: Performed by: NURSE PRACTITIONER

## 2021-05-21 RX ORDER — RANOLAZINE 1000 MG/1
1000 TABLET, EXTENDED RELEASE ORAL 2 TIMES DAILY
Qty: 60 TABLET | Refills: 6 | Status: SHIPPED | OUTPATIENT
Start: 2021-05-21 | End: 2021-12-20

## 2021-05-21 RX ORDER — METHYLPREDNISOLONE SOD SUCC 125 MG
80 VIAL (EA) INJECTION
Status: COMPLETED | OUTPATIENT
Start: 2021-05-21 | End: 2021-05-21

## 2021-05-21 RX ORDER — SODIUM CHLORIDE 0.9 % (FLUSH) 0.9 %
10 SYRINGE (ML) INJECTION
Status: DISCONTINUED | OUTPATIENT
Start: 2021-05-21 | End: 2021-05-21 | Stop reason: HOSPADM

## 2021-05-21 RX ADMIN — METHYLPREDNISOLONE SODIUM SUCCINATE 80 MG: 125 INJECTION, POWDER, FOR SOLUTION INTRAMUSCULAR; INTRAVENOUS at 10:05

## 2021-05-21 RX ADMIN — FERRIC CARBOXYMALTOSE INJECTION 750 MG: 50 INJECTION, SOLUTION INTRAVENOUS at 10:05

## 2021-05-24 RX ORDER — ATORVASTATIN CALCIUM 40 MG/1
40 TABLET, FILM COATED ORAL DAILY
Qty: 90 TABLET | Refills: 3 | Status: SHIPPED | OUTPATIENT
Start: 2021-05-24 | End: 2022-02-21

## 2021-05-25 ENCOUNTER — OFFICE VISIT (OUTPATIENT)
Dept: OTOLARYNGOLOGY | Facility: CLINIC | Age: 76
End: 2021-05-25
Payer: MEDICARE

## 2021-05-25 VITALS
BODY MASS INDEX: 24.8 KG/M2 | HEIGHT: 63 IN | TEMPERATURE: 98 F | WEIGHT: 140 LBS | SYSTOLIC BLOOD PRESSURE: 154 MMHG | DIASTOLIC BLOOD PRESSURE: 81 MMHG | HEART RATE: 97 BPM

## 2021-05-25 DIAGNOSIS — R05.3 CHRONIC COUGH: ICD-10-CM

## 2021-05-25 DIAGNOSIS — J34.89 NASAL SEPTAL PERFORATION: Primary | ICD-10-CM

## 2021-05-25 PROCEDURE — 31575 DIAGNOSTIC LARYNGOSCOPY: CPT | Mod: S$GLB,,, | Performed by: OTOLARYNGOLOGY

## 2021-05-25 PROCEDURE — 1159F MED LIST DOCD IN RCRD: CPT | Mod: S$GLB,,, | Performed by: OTOLARYNGOLOGY

## 2021-05-25 PROCEDURE — 99999 PR PBB SHADOW E&M-EST. PATIENT-LVL IV: CPT | Mod: PBBFAC,,, | Performed by: OTOLARYNGOLOGY

## 2021-05-25 PROCEDURE — 3288F FALL RISK ASSESSMENT DOCD: CPT | Mod: CPTII,S$GLB,, | Performed by: OTOLARYNGOLOGY

## 2021-05-25 PROCEDURE — 1159F PR MEDICATION LIST DOCUMENTED IN MEDICAL RECORD: ICD-10-PCS | Mod: S$GLB,,, | Performed by: OTOLARYNGOLOGY

## 2021-05-25 PROCEDURE — 3288F PR FALLS RISK ASSESSMENT DOCUMENTED: ICD-10-PCS | Mod: CPTII,S$GLB,, | Performed by: OTOLARYNGOLOGY

## 2021-05-25 PROCEDURE — 99213 PR OFFICE/OUTPT VISIT, EST, LEVL III, 20-29 MIN: ICD-10-PCS | Mod: 25,S$GLB,, | Performed by: OTOLARYNGOLOGY

## 2021-05-25 PROCEDURE — 31575 PR LARYNGOSCOPY, FLEXIBLE; DIAGNOSTIC: ICD-10-PCS | Mod: S$GLB,,, | Performed by: OTOLARYNGOLOGY

## 2021-05-25 PROCEDURE — 99999 PR PBB SHADOW E&M-EST. PATIENT-LVL IV: ICD-10-PCS | Mod: PBBFAC,,, | Performed by: OTOLARYNGOLOGY

## 2021-05-25 PROCEDURE — 1101F PR PT FALLS ASSESS DOC 0-1 FALLS W/OUT INJ PAST YR: ICD-10-PCS | Mod: CPTII,S$GLB,, | Performed by: OTOLARYNGOLOGY

## 2021-05-25 PROCEDURE — 99213 OFFICE O/P EST LOW 20 MIN: CPT | Mod: 25,S$GLB,, | Performed by: OTOLARYNGOLOGY

## 2021-05-25 PROCEDURE — 1101F PT FALLS ASSESS-DOCD LE1/YR: CPT | Mod: CPTII,S$GLB,, | Performed by: OTOLARYNGOLOGY

## 2021-07-19 ENCOUNTER — PATIENT OUTREACH (OUTPATIENT)
Dept: ADMINISTRATIVE | Facility: OTHER | Age: 76
End: 2021-07-19

## 2021-07-19 ENCOUNTER — TELEPHONE (OUTPATIENT)
Dept: CARDIOLOGY | Facility: CLINIC | Age: 76
End: 2021-07-19

## 2021-07-20 ENCOUNTER — OFFICE VISIT (OUTPATIENT)
Dept: PAIN MEDICINE | Facility: CLINIC | Age: 76
End: 2021-07-20
Payer: MEDICARE

## 2021-07-20 VITALS
WEIGHT: 139.13 LBS | DIASTOLIC BLOOD PRESSURE: 78 MMHG | BODY MASS INDEX: 24.65 KG/M2 | HEART RATE: 85 BPM | SYSTOLIC BLOOD PRESSURE: 143 MMHG | HEIGHT: 63 IN

## 2021-07-20 DIAGNOSIS — M47.816 LUMBAR SPONDYLOSIS: ICD-10-CM

## 2021-07-20 DIAGNOSIS — M47.816 LUMBAR FACET ARTHROPATHY: ICD-10-CM

## 2021-07-20 DIAGNOSIS — M47.812 CERVICAL FACET JOINT SYNDROME: ICD-10-CM

## 2021-07-20 DIAGNOSIS — M54.16 LUMBAR RADICULOPATHY: ICD-10-CM

## 2021-07-20 DIAGNOSIS — M47.812 CERVICAL SPONDYLOSIS: Primary | ICD-10-CM

## 2021-07-20 PROCEDURE — 3288F FALL RISK ASSESSMENT DOCD: CPT | Mod: CPTII,S$GLB,, | Performed by: ANESTHESIOLOGY

## 2021-07-20 PROCEDURE — 1125F PR PAIN SEVERITY QUANTIFIED, PAIN PRESENT: ICD-10-PCS | Mod: CPTII,S$GLB,, | Performed by: ANESTHESIOLOGY

## 2021-07-20 PROCEDURE — 3077F SYST BP >= 140 MM HG: CPT | Mod: CPTII,S$GLB,, | Performed by: ANESTHESIOLOGY

## 2021-07-20 PROCEDURE — 99214 OFFICE O/P EST MOD 30 MIN: CPT | Mod: 25,S$GLB,, | Performed by: ANESTHESIOLOGY

## 2021-07-20 PROCEDURE — 1125F AMNT PAIN NOTED PAIN PRSNT: CPT | Mod: CPTII,S$GLB,, | Performed by: ANESTHESIOLOGY

## 2021-07-20 PROCEDURE — 3078F DIAST BP <80 MM HG: CPT | Mod: CPTII,S$GLB,, | Performed by: ANESTHESIOLOGY

## 2021-07-20 PROCEDURE — 1101F PR PT FALLS ASSESS DOC 0-1 FALLS W/OUT INJ PAST YR: ICD-10-PCS | Mod: CPTII,S$GLB,, | Performed by: ANESTHESIOLOGY

## 2021-07-20 PROCEDURE — 3078F PR MOST RECENT DIASTOLIC BLOOD PRESSURE < 80 MM HG: ICD-10-PCS | Mod: CPTII,S$GLB,, | Performed by: ANESTHESIOLOGY

## 2021-07-20 PROCEDURE — 96372 THER/PROPH/DIAG INJ SC/IM: CPT | Mod: S$GLB,,, | Performed by: ANESTHESIOLOGY

## 2021-07-20 PROCEDURE — 3288F PR FALLS RISK ASSESSMENT DOCUMENTED: ICD-10-PCS | Mod: CPTII,S$GLB,, | Performed by: ANESTHESIOLOGY

## 2021-07-20 PROCEDURE — 3077F PR MOST RECENT SYSTOLIC BLOOD PRESSURE >= 140 MM HG: ICD-10-PCS | Mod: CPTII,S$GLB,, | Performed by: ANESTHESIOLOGY

## 2021-07-20 PROCEDURE — 99999 PR PBB SHADOW E&M-EST. PATIENT-LVL IV: CPT | Mod: PBBFAC,,, | Performed by: ANESTHESIOLOGY

## 2021-07-20 PROCEDURE — 1101F PT FALLS ASSESS-DOCD LE1/YR: CPT | Mod: CPTII,S$GLB,, | Performed by: ANESTHESIOLOGY

## 2021-07-20 PROCEDURE — 1159F PR MEDICATION LIST DOCUMENTED IN MEDICAL RECORD: ICD-10-PCS | Mod: CPTII,S$GLB,, | Performed by: ANESTHESIOLOGY

## 2021-07-20 PROCEDURE — 99214 PR OFFICE/OUTPT VISIT, EST, LEVL IV, 30-39 MIN: ICD-10-PCS | Mod: 25,S$GLB,, | Performed by: ANESTHESIOLOGY

## 2021-07-20 PROCEDURE — 1159F MED LIST DOCD IN RCRD: CPT | Mod: CPTII,S$GLB,, | Performed by: ANESTHESIOLOGY

## 2021-07-20 PROCEDURE — 99999 PR PBB SHADOW E&M-EST. PATIENT-LVL IV: ICD-10-PCS | Mod: PBBFAC,,, | Performed by: ANESTHESIOLOGY

## 2021-07-20 PROCEDURE — 96372 PR INJECTION,THERAP/PROPH/DIAG2ST, IM OR SUBCUT: ICD-10-PCS | Mod: S$GLB,,, | Performed by: ANESTHESIOLOGY

## 2021-07-20 RX ORDER — METHYLPREDNISOLONE ACETATE 40 MG/ML
40 INJECTION, SUSPENSION INTRA-ARTICULAR; INTRALESIONAL; INTRAMUSCULAR; SOFT TISSUE
Status: COMPLETED | OUTPATIENT
Start: 2021-07-20 | End: 2021-07-20

## 2021-07-20 RX ADMIN — METHYLPREDNISOLONE ACETATE 40 MG: 40 INJECTION, SUSPENSION INTRA-ARTICULAR; INTRALESIONAL; INTRAMUSCULAR; SOFT TISSUE at 03:07

## 2021-07-28 ENCOUNTER — TELEPHONE (OUTPATIENT)
Dept: OPHTHALMOLOGY | Facility: CLINIC | Age: 76
End: 2021-07-28

## 2021-07-28 ENCOUNTER — OFFICE VISIT (OUTPATIENT)
Dept: CARDIOLOGY | Facility: CLINIC | Age: 76
End: 2021-07-28
Payer: MEDICARE

## 2021-07-28 VITALS
WEIGHT: 140 LBS | SYSTOLIC BLOOD PRESSURE: 142 MMHG | BODY MASS INDEX: 24.8 KG/M2 | OXYGEN SATURATION: 96 % | DIASTOLIC BLOOD PRESSURE: 76 MMHG | HEART RATE: 91 BPM | HEIGHT: 63 IN

## 2021-07-28 DIAGNOSIS — E11.59 HYPERTENSION ASSOCIATED WITH DIABETES: ICD-10-CM

## 2021-07-28 DIAGNOSIS — R09.89 CAROTID BRUIT, UNSPECIFIED LATERALITY: ICD-10-CM

## 2021-07-28 DIAGNOSIS — I25.10 CAD IN NATIVE ARTERY: ICD-10-CM

## 2021-07-28 DIAGNOSIS — E11.22 CONTROLLED TYPE 2 DIABETES MELLITUS WITH STAGE 3 CHRONIC KIDNEY DISEASE, WITHOUT LONG-TERM CURRENT USE OF INSULIN: ICD-10-CM

## 2021-07-28 DIAGNOSIS — Z86.73 HX OF COMPLETED STROKE: ICD-10-CM

## 2021-07-28 DIAGNOSIS — Z98.890 HISTORY OF CEREBRAL ANEURYSM REPAIR: ICD-10-CM

## 2021-07-28 DIAGNOSIS — R00.2 PALPITATIONS: ICD-10-CM

## 2021-07-28 DIAGNOSIS — Z86.79 HISTORY OF CEREBRAL ANEURYSM REPAIR: ICD-10-CM

## 2021-07-28 DIAGNOSIS — N18.30 CONTROLLED TYPE 2 DIABETES MELLITUS WITH STAGE 3 CHRONIC KIDNEY DISEASE, WITHOUT LONG-TERM CURRENT USE OF INSULIN: ICD-10-CM

## 2021-07-28 DIAGNOSIS — I25.118 CORONARY ARTERY DISEASE OF NATIVE ARTERY OF NATIVE HEART WITH STABLE ANGINA PECTORIS: Primary | ICD-10-CM

## 2021-07-28 DIAGNOSIS — I15.2 HYPERTENSION ASSOCIATED WITH DIABETES: ICD-10-CM

## 2021-07-28 PROCEDURE — 99999 PR PBB SHADOW E&M-EST. PATIENT-LVL V: CPT | Mod: PBBFAC,,, | Performed by: INTERNAL MEDICINE

## 2021-07-28 PROCEDURE — 1159F PR MEDICATION LIST DOCUMENTED IN MEDICAL RECORD: ICD-10-PCS | Mod: CPTII,S$GLB,, | Performed by: INTERNAL MEDICINE

## 2021-07-28 PROCEDURE — 3078F DIAST BP <80 MM HG: CPT | Mod: CPTII,S$GLB,, | Performed by: INTERNAL MEDICINE

## 2021-07-28 PROCEDURE — 3077F PR MOST RECENT SYSTOLIC BLOOD PRESSURE >= 140 MM HG: ICD-10-PCS | Mod: CPTII,S$GLB,, | Performed by: INTERNAL MEDICINE

## 2021-07-28 PROCEDURE — 3077F SYST BP >= 140 MM HG: CPT | Mod: CPTII,S$GLB,, | Performed by: INTERNAL MEDICINE

## 2021-07-28 PROCEDURE — 3078F PR MOST RECENT DIASTOLIC BLOOD PRESSURE < 80 MM HG: ICD-10-PCS | Mod: CPTII,S$GLB,, | Performed by: INTERNAL MEDICINE

## 2021-07-28 PROCEDURE — 1159F MED LIST DOCD IN RCRD: CPT | Mod: CPTII,S$GLB,, | Performed by: INTERNAL MEDICINE

## 2021-07-28 PROCEDURE — 1160F RVW MEDS BY RX/DR IN RCRD: CPT | Mod: CPTII,S$GLB,, | Performed by: INTERNAL MEDICINE

## 2021-07-28 PROCEDURE — 99214 OFFICE O/P EST MOD 30 MIN: CPT | Mod: S$GLB,,, | Performed by: INTERNAL MEDICINE

## 2021-07-28 PROCEDURE — 99999 PR PBB SHADOW E&M-EST. PATIENT-LVL V: ICD-10-PCS | Mod: PBBFAC,,, | Performed by: INTERNAL MEDICINE

## 2021-07-28 PROCEDURE — 99214 PR OFFICE/OUTPT VISIT, EST, LEVL IV, 30-39 MIN: ICD-10-PCS | Mod: S$GLB,,, | Performed by: INTERNAL MEDICINE

## 2021-07-28 PROCEDURE — 1160F PR REVIEW ALL MEDS BY PRESCRIBER/CLIN PHARMACIST DOCUMENTED: ICD-10-PCS | Mod: CPTII,S$GLB,, | Performed by: INTERNAL MEDICINE

## 2021-07-29 ENCOUNTER — TELEPHONE (OUTPATIENT)
Dept: PAIN MEDICINE | Facility: CLINIC | Age: 76
End: 2021-07-29

## 2021-07-29 DIAGNOSIS — M47.812 CERVICAL FACET JOINT SYNDROME: Primary | ICD-10-CM

## 2021-08-04 ENCOUNTER — TELEPHONE (OUTPATIENT)
Dept: INTERNAL MEDICINE | Facility: CLINIC | Age: 76
End: 2021-08-04

## 2021-08-04 ENCOUNTER — PATIENT MESSAGE (OUTPATIENT)
Dept: ADMINISTRATIVE | Facility: HOSPITAL | Age: 76
End: 2021-08-04

## 2021-08-06 ENCOUNTER — TELEPHONE (OUTPATIENT)
Dept: INTERNAL MEDICINE | Facility: CLINIC | Age: 76
End: 2021-08-06

## 2021-08-09 ENCOUNTER — TELEPHONE (OUTPATIENT)
Dept: INTERNAL MEDICINE | Facility: CLINIC | Age: 76
End: 2021-08-09

## 2021-08-09 ENCOUNTER — LAB VISIT (OUTPATIENT)
Dept: LAB | Facility: HOSPITAL | Age: 76
End: 2021-08-09
Attending: NURSE PRACTITIONER
Payer: MEDICARE

## 2021-08-09 ENCOUNTER — CLINICAL SUPPORT (OUTPATIENT)
Dept: FAMILY MEDICINE | Facility: CLINIC | Age: 76
End: 2021-08-09
Payer: MEDICARE

## 2021-08-09 DIAGNOSIS — R05.9 COUGH: ICD-10-CM

## 2021-08-09 DIAGNOSIS — R05.9 COUGH: Primary | ICD-10-CM

## 2021-08-09 DIAGNOSIS — D64.9 ANEMIA, UNSPECIFIED TYPE: ICD-10-CM

## 2021-08-09 LAB
BASOPHILS # BLD AUTO: 0.03 K/UL (ref 0–0.2)
BASOPHILS NFR BLD: 0.5 % (ref 0–1.9)
DIFFERENTIAL METHOD: ABNORMAL
EOSINOPHIL # BLD AUTO: 0.4 K/UL (ref 0–0.5)
EOSINOPHIL NFR BLD: 5.3 % (ref 0–8)
ERYTHROCYTE [DISTWIDTH] IN BLOOD BY AUTOMATED COUNT: 12.9 % (ref 11.5–14.5)
HCT VFR BLD AUTO: 34.4 % (ref 37–48.5)
HGB BLD-MCNC: 11.4 G/DL (ref 12–16)
IMM GRANULOCYTES # BLD AUTO: 0.02 K/UL (ref 0–0.04)
IMM GRANULOCYTES NFR BLD AUTO: 0.3 % (ref 0–0.5)
LYMPHOCYTES # BLD AUTO: 1.4 K/UL (ref 1–4.8)
LYMPHOCYTES NFR BLD: 21.5 % (ref 18–48)
MCH RBC QN AUTO: 31.1 PG (ref 27–31)
MCHC RBC AUTO-ENTMCNC: 33.1 G/DL (ref 32–36)
MCV RBC AUTO: 94 FL (ref 82–98)
MONOCYTES # BLD AUTO: 0.6 K/UL (ref 0.3–1)
MONOCYTES NFR BLD: 8.6 % (ref 4–15)
NEUTROPHILS # BLD AUTO: 4.2 K/UL (ref 1.8–7.7)
NEUTROPHILS NFR BLD: 64.1 % (ref 38–73)
NRBC BLD-RTO: 0 /100 WBC
PLATELET # BLD AUTO: 213 K/UL (ref 150–450)
PMV BLD AUTO: 9.3 FL (ref 9.2–12.9)
RBC # BLD AUTO: 3.67 M/UL (ref 4–5.4)
WBC # BLD AUTO: 6.61 K/UL (ref 3.9–12.7)

## 2021-08-09 PROCEDURE — 80053 COMPREHEN METABOLIC PANEL: CPT | Performed by: NURSE PRACTITIONER

## 2021-08-09 PROCEDURE — 84443 ASSAY THYROID STIM HORMONE: CPT | Performed by: NURSE PRACTITIONER

## 2021-08-09 PROCEDURE — 85025 COMPLETE CBC W/AUTO DIFF WBC: CPT | Mod: PO | Performed by: NURSE PRACTITIONER

## 2021-08-09 PROCEDURE — U0005 INFEC AGEN DETEC AMPLI PROBE: HCPCS | Performed by: PEDIATRICS

## 2021-08-09 PROCEDURE — U0003 INFECTIOUS AGENT DETECTION BY NUCLEIC ACID (DNA OR RNA); SEVERE ACUTE RESPIRATORY SYNDROME CORONAVIRUS 2 (SARS-COV-2) (CORONAVIRUS DISEASE [COVID-19]), AMPLIFIED PROBE TECHNIQUE, MAKING USE OF HIGH THROUGHPUT TECHNOLOGIES AS DESCRIBED BY CMS-2020-01-R: HCPCS | Performed by: PEDIATRICS

## 2021-08-09 PROCEDURE — 82728 ASSAY OF FERRITIN: CPT | Performed by: NURSE PRACTITIONER

## 2021-08-09 PROCEDURE — 84466 ASSAY OF TRANSFERRIN: CPT | Performed by: NURSE PRACTITIONER

## 2021-08-09 PROCEDURE — 36415 COLL VENOUS BLD VENIPUNCTURE: CPT | Mod: PO | Performed by: NURSE PRACTITIONER

## 2021-08-10 ENCOUNTER — OFFICE VISIT (OUTPATIENT)
Dept: INTERNAL MEDICINE | Facility: CLINIC | Age: 76
End: 2021-08-10
Payer: MEDICARE

## 2021-08-10 DIAGNOSIS — J01.90 ACUTE SINUSITIS, RECURRENCE NOT SPECIFIED, UNSPECIFIED LOCATION: Primary | ICD-10-CM

## 2021-08-10 DIAGNOSIS — E11.22 CONTROLLED TYPE 2 DIABETES MELLITUS WITH STAGE 3 CHRONIC KIDNEY DISEASE, WITHOUT LONG-TERM CURRENT USE OF INSULIN: ICD-10-CM

## 2021-08-10 DIAGNOSIS — J41.1 MUCOPURULENT CHRONIC BRONCHITIS: Chronic | ICD-10-CM

## 2021-08-10 DIAGNOSIS — Z87.891 HISTORY OF TOBACCO ABUSE: ICD-10-CM

## 2021-08-10 DIAGNOSIS — N18.30 CONTROLLED TYPE 2 DIABETES MELLITUS WITH STAGE 3 CHRONIC KIDNEY DISEASE, WITHOUT LONG-TERM CURRENT USE OF INSULIN: ICD-10-CM

## 2021-08-10 LAB
ALBUMIN SERPL BCP-MCNC: 3.3 G/DL (ref 3.5–5.2)
ALP SERPL-CCNC: 60 U/L (ref 55–135)
ALT SERPL W/O P-5'-P-CCNC: 22 U/L (ref 10–44)
ANION GAP SERPL CALC-SCNC: 10 MMOL/L (ref 8–16)
AST SERPL-CCNC: 25 U/L (ref 10–40)
BILIRUB SERPL-MCNC: 0.4 MG/DL (ref 0.1–1)
BUN SERPL-MCNC: 16 MG/DL (ref 8–23)
CALCIUM SERPL-MCNC: 9.4 MG/DL (ref 8.7–10.5)
CHLORIDE SERPL-SCNC: 105 MMOL/L (ref 95–110)
CO2 SERPL-SCNC: 21 MMOL/L (ref 23–29)
CREAT SERPL-MCNC: 1 MG/DL (ref 0.5–1.4)
EST. GFR  (AFRICAN AMERICAN): >60 ML/MIN/1.73 M^2
EST. GFR  (NON AFRICAN AMERICAN): 54.9 ML/MIN/1.73 M^2
GLUCOSE SERPL-MCNC: 72 MG/DL (ref 70–110)
IRON SERPL-MCNC: 47 UG/DL (ref 30–160)
POTASSIUM SERPL-SCNC: 3.4 MMOL/L (ref 3.5–5.1)
PROT SERPL-MCNC: 7.3 G/DL (ref 6–8.4)
SARS-COV-2 RNA RESP QL NAA+PROBE: NOT DETECTED
SARS-COV-2- CYCLE NUMBER: -1
SATURATED IRON: 17 % (ref 20–50)
SODIUM SERPL-SCNC: 136 MMOL/L (ref 136–145)
TOTAL IRON BINDING CAPACITY: 269 UG/DL (ref 250–450)
TRANSFERRIN SERPL-MCNC: 182 MG/DL (ref 200–375)

## 2021-08-10 PROCEDURE — 99499 NO LOS: ICD-10-PCS | Mod: 95,,, | Performed by: PHYSICIAN ASSISTANT

## 2021-08-10 PROCEDURE — 99499 UNLISTED E&M SERVICE: CPT | Mod: 95,,, | Performed by: PHYSICIAN ASSISTANT

## 2021-08-10 RX ORDER — BENZONATATE 200 MG/1
200 CAPSULE ORAL 3 TIMES DAILY PRN
Qty: 30 CAPSULE | Refills: 0 | Status: SHIPPED | OUTPATIENT
Start: 2021-08-10 | End: 2021-08-20

## 2021-08-10 RX ORDER — PROMETHAZINE HYDROCHLORIDE AND DEXTROMETHORPHAN HYDROBROMIDE 6.25; 15 MG/5ML; MG/5ML
5 SYRUP ORAL EVERY 6 HOURS PRN
Qty: 118 ML | Refills: 0 | Status: CANCELLED | OUTPATIENT
Start: 2021-08-10 | End: 2021-08-17

## 2021-08-10 RX ORDER — LEVOFLOXACIN 500 MG/1
500 TABLET, FILM COATED ORAL DAILY
Qty: 10 TABLET | Refills: 0 | Status: SHIPPED | OUTPATIENT
Start: 2021-08-10 | End: 2021-08-20

## 2021-08-10 RX ORDER — LEVOFLOXACIN 500 MG/1
500 TABLET, FILM COATED ORAL DAILY
Qty: 10 TABLET | Refills: 0 | Status: CANCELLED | OUTPATIENT
Start: 2021-08-10 | End: 2021-08-20

## 2021-08-10 RX ORDER — PROMETHAZINE HYDROCHLORIDE AND DEXTROMETHORPHAN HYDROBROMIDE 6.25; 15 MG/5ML; MG/5ML
5 SYRUP ORAL EVERY 8 HOURS PRN
Qty: 118 ML | Refills: 0 | Status: SHIPPED | OUTPATIENT
Start: 2021-08-10 | End: 2021-12-03

## 2021-08-10 RX ORDER — BENZONATATE 200 MG/1
200 CAPSULE ORAL 3 TIMES DAILY PRN
Qty: 30 CAPSULE | Refills: 0 | Status: CANCELLED | OUTPATIENT
Start: 2021-08-10 | End: 2021-08-20

## 2021-08-11 ENCOUNTER — DOCUMENTATION ONLY (OUTPATIENT)
Dept: INTERNAL MEDICINE | Facility: CLINIC | Age: 76
End: 2021-08-11

## 2021-08-11 LAB
FERRITIN SERPL-MCNC: 544 NG/ML (ref 20–300)
TSH SERPL DL<=0.005 MIU/L-ACNC: 1.25 UIU/ML (ref 0.4–4)

## 2021-08-12 ENCOUNTER — TELEPHONE (OUTPATIENT)
Dept: INTERNAL MEDICINE | Facility: CLINIC | Age: 76
End: 2021-08-12

## 2021-08-12 RX ORDER — ACETAMINOPHEN 500 MG
500 TABLET ORAL
COMMUNITY
End: 2023-02-01

## 2021-08-17 ENCOUNTER — HOSPITAL ENCOUNTER (OUTPATIENT)
Dept: CARDIOLOGY | Facility: HOSPITAL | Age: 76
Discharge: HOME | End: 2021-08-17
Attending: INTERNAL MEDICINE
Payer: MEDICARE

## 2021-08-17 ENCOUNTER — HOSPITAL ENCOUNTER (OUTPATIENT)
Dept: RADIOLOGY | Facility: HOSPITAL | Age: 76
Discharge: HOME | End: 2021-08-17
Attending: INTERNAL MEDICINE
Payer: MEDICARE

## 2021-08-17 ENCOUNTER — OFFICE VISIT (OUTPATIENT)
Dept: INTERNAL MEDICINE | Facility: CLINIC | Age: 76
End: 2021-08-17
Payer: MEDICARE

## 2021-08-17 ENCOUNTER — HOSPITAL ENCOUNTER (OUTPATIENT)
Dept: RADIOLOGY | Facility: HOSPITAL | Age: 76
Discharge: HOME | End: 2021-08-17
Attending: PHYSICIAN ASSISTANT
Payer: MEDICARE

## 2021-08-17 VITALS
BODY MASS INDEX: 24.63 KG/M2 | SYSTOLIC BLOOD PRESSURE: 142 MMHG | WEIGHT: 139 LBS | HEIGHT: 63 IN | DIASTOLIC BLOOD PRESSURE: 76 MMHG

## 2021-08-17 VITALS
HEART RATE: 101 BPM | SYSTOLIC BLOOD PRESSURE: 142 MMHG | TEMPERATURE: 97 F | BODY MASS INDEX: 24.62 KG/M2 | DIASTOLIC BLOOD PRESSURE: 90 MMHG | HEIGHT: 63 IN | OXYGEN SATURATION: 98 %

## 2021-08-17 DIAGNOSIS — Z86.73 HX OF COMPLETED STROKE: ICD-10-CM

## 2021-08-17 DIAGNOSIS — J41.1 MUCOPURULENT CHRONIC BRONCHITIS: Chronic | ICD-10-CM

## 2021-08-17 DIAGNOSIS — E11.59 HYPERTENSION ASSOCIATED WITH DIABETES: ICD-10-CM

## 2021-08-17 DIAGNOSIS — R05.9 COUGH: ICD-10-CM

## 2021-08-17 DIAGNOSIS — R00.2 PALPITATIONS: ICD-10-CM

## 2021-08-17 DIAGNOSIS — I15.2 HYPERTENSION ASSOCIATED WITH DIABETES: ICD-10-CM

## 2021-08-17 DIAGNOSIS — Z86.73 HISTORY OF STROKE: ICD-10-CM

## 2021-08-17 DIAGNOSIS — I25.118 CORONARY ARTERY DISEASE OF NATIVE ARTERY OF NATIVE HEART WITH STABLE ANGINA PECTORIS: ICD-10-CM

## 2021-08-17 DIAGNOSIS — I10 ESSENTIAL HYPERTENSION: ICD-10-CM

## 2021-08-17 DIAGNOSIS — Z20.822 EXPOSURE TO COVID-19 VIRUS: Primary | ICD-10-CM

## 2021-08-17 DIAGNOSIS — I25.10 CAD IN NATIVE ARTERY: ICD-10-CM

## 2021-08-17 DIAGNOSIS — R53.81 DEBILITY: ICD-10-CM

## 2021-08-17 DIAGNOSIS — R09.89 CAROTID BRUIT, UNSPECIFIED LATERALITY: ICD-10-CM

## 2021-08-17 LAB
AORTIC ROOT ANNULUS: 2.62 CM
ASCENDING AORTA: 2.72 CM
AV INDEX (PROSTH): 0.56
AV MEAN GRADIENT: 6 MMHG
AV PEAK GRADIENT: 10 MMHG
AV VALVE AREA: 1.82 CM2
AV VELOCITY RATIO: 0.61
BSA FOR ECHO PROCEDURE: 1.67 M2
CV ECHO LV RWT: 0.6 CM
CV STRESS BASE HR: 90 BPM
DIASTOLIC BLOOD PRESSURE: 82 MMHG
DOP CALC AO PEAK VEL: 1.55 M/S
DOP CALC AO VTI: 34.4 CM
DOP CALC LVOT AREA: 3.2 CM2
DOP CALC LVOT DIAMETER: 2.03 CM
DOP CALC LVOT PEAK VEL: 0.94 M/S
DOP CALC LVOT STROKE VOLUME: 62.76 CM3
DOP CALC RVOT PEAK VEL: 0.92 M/S
DOP CALC RVOT VTI: 17.4 CM
DOP CALCLVOT PEAK VEL VTI: 19.4 CM
E WAVE DECELERATION TIME: 166.03 MSEC
E/A RATIO: 0.48
E/E' RATIO: 10.2 M/S
ECHO LV POSTERIOR WALL: 1.26 CM (ref 0.6–1.1)
EJECTION FRACTION: 60 %
FRACTIONAL SHORTENING: 25 % (ref 28–44)
INFLUENZA A, MOLECULAR: NEGATIVE
INFLUENZA B, MOLECULAR: NEGATIVE
INTERVENTRICULAR SEPTUM: 1.17 CM (ref 0.6–1.1)
LA MAJOR: 5.35 CM
LA MINOR: 5.15 CM
LA WIDTH: 2.74 CM
LEFT ARM DIASTOLIC BLOOD PRESSURE: 77 MMHG
LEFT ARM SYSTOLIC BLOOD PRESSURE: 167 MMHG
LEFT ATRIUM SIZE: 3.3 CM
LEFT ATRIUM VOLUME INDEX: 24.3 ML/M2
LEFT ATRIUM VOLUME: 40.34 CM3
LEFT CBA DIAS: 12 CM/S
LEFT CBA SYS: 66 CM/S
LEFT CCA DIST DIAS: 15 CM/S
LEFT CCA DIST SYS: 64 CM/S
LEFT CCA MID DIAS: 18 CM/S
LEFT CCA MID SYS: 78 CM/S
LEFT CCA PROX DIAS: 17 CM/S
LEFT CCA PROX SYS: 99 CM/S
LEFT ECA DIAS: 5 CM/S
LEFT ECA SYS: 60 CM/S
LEFT ICA DIST DIAS: 22 CM/S
LEFT ICA DIST SYS: 75 CM/S
LEFT ICA MID DIAS: 23 CM/S
LEFT ICA MID SYS: 91 CM/S
LEFT ICA PROX DIAS: 15 CM/S
LEFT ICA PROX SYS: 66 CM/S
LEFT INTERNAL DIMENSION IN SYSTOLE: 3.16 CM (ref 2.1–4)
LEFT VENTRICLE DIASTOLIC VOLUME INDEX: 47.89 ML/M2
LEFT VENTRICLE DIASTOLIC VOLUME: 79.49 ML
LEFT VENTRICLE MASS INDEX: 110 G/M2
LEFT VENTRICLE SYSTOLIC VOLUME INDEX: 23.8 ML/M2
LEFT VENTRICLE SYSTOLIC VOLUME: 39.58 ML
LEFT VENTRICULAR INTERNAL DIMENSION IN DIASTOLE: 4.22 CM (ref 3.5–6)
LEFT VENTRICULAR MASS: 182.75 G
LEFT VERTEBRAL DIAS: 13 CM/S
LEFT VERTEBRAL SYS: 79 CM/S
LV LATERAL E/E' RATIO: 10.2 M/S
LV SEPTAL E/E' RATIO: 10.2 M/S
MV PEAK A VEL: 1.07 M/S
MV PEAK E VEL: 0.51 M/S
MV STENOSIS PRESSURE HALF TIME: 48.15 MS
MV VALVE AREA P 1/2 METHOD: 4.57 CM2
NUC REST EJECTION FRACTION: 69
NUC STRESS EJECTION FRACTION: 58 %
OHS CV CAROTID RIGHT ICA EDV HIGHEST: 21
OHS CV CAROTID ULTRASOUND LEFT ICA/CCA RATIO: 1.42
OHS CV CAROTID ULTRASOUND RIGHT ICA/CCA RATIO: 1.21
OHS CV CPX 85 PERCENT MAX PREDICTED HEART RATE MALE: 118
OHS CV CPX MAX PREDICTED HEART RATE: 139
OHS CV CPX PATIENT IS FEMALE: 1
OHS CV CPX PATIENT IS MALE: 0
OHS CV CPX PEAK DIASTOLIC BLOOD PRESSURE: 71 MMHG
OHS CV CPX PEAK HEAR RATE: 109 BPM
OHS CV CPX PEAK RATE PRESSURE PRODUCT: NORMAL
OHS CV CPX PEAK SYSTOLIC BLOOD PRESSURE: 160 MMHG
OHS CV CPX PERCENT MAX PREDICTED HEART RATE ACHIEVED: 78
OHS CV CPX RATE PRESSURE PRODUCT PRESENTING: NORMAL
OHS CV PV CAROTID LEFT HIGHEST CCA: 99
OHS CV PV CAROTID LEFT HIGHEST ICA: 91
OHS CV PV CAROTID RIGHT HIGHEST CCA: 107
OHS CV PV CAROTID RIGHT HIGHEST ICA: 86
OHS CV US CAROTID LEFT HIGHEST EDV: 23
PV MEAN GRADIENT: 2 MMHG
PV PEAK VELOCITY: 1.06 CM/S
RA MAJOR: 4.35 CM
RA WIDTH: 3.19 CM
RIGHT ARM DIASTOLIC BLOOD PRESSURE: 81 MMHG
RIGHT ARM SYSTOLIC BLOOD PRESSURE: 184 MMHG
RIGHT CBA DIAS: 12 CM/S
RIGHT CBA SYS: 52 CM/S
RIGHT CCA DIST DIAS: 15 CM/S
RIGHT CCA DIST SYS: 71 CM/S
RIGHT CCA MID DIAS: 14 CM/S
RIGHT CCA MID SYS: 86 CM/S
RIGHT CCA PROX DIAS: 10 CM/S
RIGHT CCA PROX SYS: 107 CM/S
RIGHT ECA DIAS: 5 CM/S
RIGHT ECA SYS: 95 CM/S
RIGHT ICA DIST DIAS: 17 CM/S
RIGHT ICA DIST SYS: 86 CM/S
RIGHT ICA MID DIAS: 21 CM/S
RIGHT ICA MID SYS: 74 CM/S
RIGHT ICA PROX DIAS: 10 CM/S
RIGHT ICA PROX SYS: 58 CM/S
RIGHT VENTRICULAR END-DIASTOLIC DIMENSION: 2.8 CM
RIGHT VERTEBRAL DIAS: 16 CM/S
RIGHT VERTEBRAL SYS: 64 CM/S
SINUS: 2.68 CM
SPECIMEN SOURCE: NORMAL
STJ: 2.7 CM
STRESS ECHO POST EXERCISE DUR SEC: 55 SECONDS
SYSTOLIC BLOOD PRESSURE: 168 MMHG
TDI LATERAL: 0.05 M/S
TDI SEPTAL: 0.05 M/S
TDI: 0.05 M/S
TRICUSPID ANNULAR PLANE SYSTOLIC EXCURSION: 1.9 CM

## 2021-08-17 PROCEDURE — 87502 INFLUENZA DNA AMP PROBE: CPT | Performed by: PHYSICIAN ASSISTANT

## 2021-08-17 PROCEDURE — 1160F RVW MEDS BY RX/DR IN RCRD: CPT | Mod: CPTII,S$GLB,, | Performed by: PHYSICIAN ASSISTANT

## 2021-08-17 PROCEDURE — U0002: ICD-10-PCS | Mod: QW,S$GLB,, | Performed by: PHYSICIAN ASSISTANT

## 2021-08-17 PROCEDURE — 3080F DIAST BP >= 90 MM HG: CPT | Mod: CPTII,S$GLB,, | Performed by: PHYSICIAN ASSISTANT

## 2021-08-17 PROCEDURE — 93016 CV STRESS TEST SUPVJ ONLY: CPT | Mod: ,,, | Performed by: INTERNAL MEDICINE

## 2021-08-17 PROCEDURE — 99999 PR PBB SHADOW E&M-EST. PATIENT-LVL V: CPT | Mod: PBBFAC,,, | Performed by: PHYSICIAN ASSISTANT

## 2021-08-17 PROCEDURE — 99214 OFFICE O/P EST MOD 30 MIN: CPT | Mod: S$GLB,,, | Performed by: PHYSICIAN ASSISTANT

## 2021-08-17 PROCEDURE — 99999 PR PBB SHADOW E&M-EST. PATIENT-LVL V: ICD-10-PCS | Mod: PBBFAC,,, | Performed by: PHYSICIAN ASSISTANT

## 2021-08-17 PROCEDURE — 71046 X-RAY EXAM CHEST 2 VIEWS: CPT | Mod: TC

## 2021-08-17 PROCEDURE — 3080F PR MOST RECENT DIASTOLIC BLOOD PRESSURE >= 90 MM HG: ICD-10-PCS | Mod: CPTII,S$GLB,, | Performed by: PHYSICIAN ASSISTANT

## 2021-08-17 PROCEDURE — 1126F PR PAIN SEVERITY QUANTIFIED, NO PAIN PRESENT: ICD-10-PCS | Mod: CPTII,S$GLB,, | Performed by: PHYSICIAN ASSISTANT

## 2021-08-17 PROCEDURE — 93017 CV STRESS TEST TRACING ONLY: CPT

## 2021-08-17 PROCEDURE — 63600175 PHARM REV CODE 636 W HCPCS: Performed by: INTERNAL MEDICINE

## 2021-08-17 PROCEDURE — 93306 TTE W/DOPPLER COMPLETE: CPT

## 2021-08-17 PROCEDURE — 93880 EXTRACRANIAL BILAT STUDY: CPT

## 2021-08-17 PROCEDURE — 99214 PR OFFICE/OUTPT VISIT, EST, LEVL IV, 30-39 MIN: ICD-10-PCS | Mod: S$GLB,,, | Performed by: PHYSICIAN ASSISTANT

## 2021-08-17 PROCEDURE — 3288F PR FALLS RISK ASSESSMENT DOCUMENTED: ICD-10-PCS | Mod: CPTII,S$GLB,, | Performed by: PHYSICIAN ASSISTANT

## 2021-08-17 PROCEDURE — 78452 HT MUSCLE IMAGE SPECT MULT: CPT

## 2021-08-17 PROCEDURE — 78452 STRESS TEST WITH MYOCARDIAL PERFUSION (CUPID ONLY): ICD-10-PCS | Mod: 26,,, | Performed by: INTERNAL MEDICINE

## 2021-08-17 PROCEDURE — 93018 STRESS TEST WITH MYOCARDIAL PERFUSION (CUPID ONLY): ICD-10-PCS | Mod: ,,, | Performed by: INTERNAL MEDICINE

## 2021-08-17 PROCEDURE — 1159F PR MEDICATION LIST DOCUMENTED IN MEDICAL RECORD: ICD-10-PCS | Mod: CPTII,S$GLB,, | Performed by: PHYSICIAN ASSISTANT

## 2021-08-17 PROCEDURE — 93880 EXTRACRANIAL BILAT STUDY: CPT | Mod: 26,,, | Performed by: INTERNAL MEDICINE

## 2021-08-17 PROCEDURE — 1101F PT FALLS ASSESS-DOCD LE1/YR: CPT | Mod: CPTII,S$GLB,, | Performed by: PHYSICIAN ASSISTANT

## 2021-08-17 PROCEDURE — 3288F FALL RISK ASSESSMENT DOCD: CPT | Mod: CPTII,S$GLB,, | Performed by: PHYSICIAN ASSISTANT

## 2021-08-17 PROCEDURE — 93306 ECHO (CUPID ONLY): ICD-10-PCS | Mod: 26,,, | Performed by: INTERNAL MEDICINE

## 2021-08-17 PROCEDURE — 71046 XR CHEST PA AND LATERAL: ICD-10-PCS | Mod: 26,,, | Performed by: RADIOLOGY

## 2021-08-17 PROCEDURE — 93018 CV STRESS TEST I&R ONLY: CPT | Mod: ,,, | Performed by: INTERNAL MEDICINE

## 2021-08-17 PROCEDURE — 1126F AMNT PAIN NOTED NONE PRSNT: CPT | Mod: CPTII,S$GLB,, | Performed by: PHYSICIAN ASSISTANT

## 2021-08-17 PROCEDURE — 78452 HT MUSCLE IMAGE SPECT MULT: CPT | Mod: 26,,, | Performed by: INTERNAL MEDICINE

## 2021-08-17 PROCEDURE — A9502 TC99M TETROFOSMIN: HCPCS

## 2021-08-17 PROCEDURE — 3077F PR MOST RECENT SYSTOLIC BLOOD PRESSURE >= 140 MM HG: ICD-10-PCS | Mod: CPTII,S$GLB,, | Performed by: PHYSICIAN ASSISTANT

## 2021-08-17 PROCEDURE — 1160F PR REVIEW ALL MEDS BY PRESCRIBER/CLIN PHARMACIST DOCUMENTED: ICD-10-PCS | Mod: CPTII,S$GLB,, | Performed by: PHYSICIAN ASSISTANT

## 2021-08-17 PROCEDURE — 93227 HOLTER MONITOR - 48 HOUR (CUPID ONLY): ICD-10-PCS | Mod: ,,, | Performed by: INTERNAL MEDICINE

## 2021-08-17 PROCEDURE — 3077F SYST BP >= 140 MM HG: CPT | Mod: CPTII,S$GLB,, | Performed by: PHYSICIAN ASSISTANT

## 2021-08-17 PROCEDURE — 93016 STRESS TEST WITH MYOCARDIAL PERFUSION (CUPID ONLY): ICD-10-PCS | Mod: ,,, | Performed by: INTERNAL MEDICINE

## 2021-08-17 PROCEDURE — 71046 X-RAY EXAM CHEST 2 VIEWS: CPT | Mod: 26,,, | Performed by: RADIOLOGY

## 2021-08-17 PROCEDURE — 1101F PR PT FALLS ASSESS DOC 0-1 FALLS W/OUT INJ PAST YR: ICD-10-PCS | Mod: CPTII,S$GLB,, | Performed by: PHYSICIAN ASSISTANT

## 2021-08-17 PROCEDURE — 93306 TTE W/DOPPLER COMPLETE: CPT | Mod: 26,,, | Performed by: INTERNAL MEDICINE

## 2021-08-17 PROCEDURE — 1159F MED LIST DOCD IN RCRD: CPT | Mod: CPTII,S$GLB,, | Performed by: PHYSICIAN ASSISTANT

## 2021-08-17 PROCEDURE — 93227 XTRNL ECG REC<48 HR R&I: CPT | Mod: ,,, | Performed by: INTERNAL MEDICINE

## 2021-08-17 PROCEDURE — U0002 COVID-19 LAB TEST NON-CDC: HCPCS | Mod: QW,S$GLB,, | Performed by: PHYSICIAN ASSISTANT

## 2021-08-17 PROCEDURE — 93880 CV US DOPPLER CAROTID (CUPID ONLY): ICD-10-PCS | Mod: 26,,, | Performed by: INTERNAL MEDICINE

## 2021-08-17 PROCEDURE — 93226 XTRNL ECG REC<48 HR SCAN A/R: CPT

## 2021-08-17 RX ORDER — REGADENOSON 0.08 MG/ML
0.4 INJECTION, SOLUTION INTRAVENOUS ONCE
Status: COMPLETED | OUTPATIENT
Start: 2021-08-17 | End: 2021-08-17

## 2021-08-17 RX ORDER — ROFLUMILAST 500 UG/1
1 TABLET ORAL DAILY
Qty: 90 TABLET | Refills: 1 | Status: SHIPPED | OUTPATIENT
Start: 2021-08-17 | End: 2022-03-18

## 2021-08-17 RX ADMIN — REGADENOSON 0.4 MG: 0.08 INJECTION, SOLUTION INTRAVENOUS at 09:08

## 2021-08-18 ENCOUNTER — TELEPHONE (OUTPATIENT)
Dept: INTERNAL MEDICINE | Facility: CLINIC | Age: 76
End: 2021-08-18

## 2021-08-18 LAB
CTP QC/QA: YES
SARS-COV-2 RDRP RESP QL NAA+PROBE: NEGATIVE

## 2021-08-19 ENCOUNTER — TELEPHONE (OUTPATIENT)
Dept: NEUROLOGY | Facility: CLINIC | Age: 76
End: 2021-08-19

## 2021-08-19 ENCOUNTER — TELEPHONE (OUTPATIENT)
Dept: INTERNAL MEDICINE | Facility: CLINIC | Age: 76
End: 2021-08-19

## 2021-08-19 ENCOUNTER — PATIENT OUTREACH (OUTPATIENT)
Dept: ADMINISTRATIVE | Facility: OTHER | Age: 76
End: 2021-08-19

## 2021-08-19 ENCOUNTER — LAB VISIT (OUTPATIENT)
Dept: LAB | Facility: HOSPITAL | Age: 76
End: 2021-08-19
Attending: PSYCHIATRY & NEUROLOGY
Payer: MEDICARE

## 2021-08-19 ENCOUNTER — OFFICE VISIT (OUTPATIENT)
Dept: NEUROLOGY | Facility: CLINIC | Age: 76
End: 2021-08-19
Payer: MEDICARE

## 2021-08-19 VITALS
BODY MASS INDEX: 24.62 KG/M2 | DIASTOLIC BLOOD PRESSURE: 78 MMHG | OXYGEN SATURATION: 99 % | HEIGHT: 63 IN | HEART RATE: 93 BPM | SYSTOLIC BLOOD PRESSURE: 122 MMHG | RESPIRATION RATE: 16 BRPM

## 2021-08-19 DIAGNOSIS — B37.0 THRUSH, ORAL: ICD-10-CM

## 2021-08-19 DIAGNOSIS — E11.65 TYPE 2 DIABETES MELLITUS WITH HYPERGLYCEMIA, WITH LONG-TERM CURRENT USE OF INSULIN: ICD-10-CM

## 2021-08-19 DIAGNOSIS — Z79.4 TYPE 2 DIABETES MELLITUS WITH HYPERGLYCEMIA, WITH LONG-TERM CURRENT USE OF INSULIN: ICD-10-CM

## 2021-08-19 DIAGNOSIS — R56.9 CONVULSIONS, UNSPECIFIED CONVULSION TYPE: ICD-10-CM

## 2021-08-19 DIAGNOSIS — R29.90 MULTIPLE NEUROLOGICAL SYMPTOMS: Primary | ICD-10-CM

## 2021-08-19 DIAGNOSIS — M54.12 CERVICAL RADICULOPATHY: ICD-10-CM

## 2021-08-19 DIAGNOSIS — J34.89 NASAL SEPTAL PERFORATION: ICD-10-CM

## 2021-08-19 DIAGNOSIS — R26.89 IMBALANCE: ICD-10-CM
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
## 2021-08-19 DIAGNOSIS — H66.3X9 CHRONIC SUPPURATIVE OTITIS MEDIA, UNSPECIFIED LATERALITY, UNSPECIFIED OTITIS MEDIA LOCATION: ICD-10-CM

## 2021-08-19 DIAGNOSIS — N18.30 CONTROLLED TYPE 2 DIABETES MELLITUS WITH STAGE 3 CHRONIC KIDNEY DISEASE, WITHOUT LONG-TERM CURRENT USE OF INSULIN: ICD-10-CM

## 2021-08-19 DIAGNOSIS — Z87.891 HISTORY OF TOBACCO ABUSE: ICD-10-CM

## 2021-08-19 DIAGNOSIS — M51.37 DDD (DEGENERATIVE DISC DISEASE), LUMBOSACRAL: ICD-10-CM

## 2021-08-19 DIAGNOSIS — Z86.79 HISTORY OF CEREBRAL ANEURYSM REPAIR: ICD-10-CM

## 2021-08-19 DIAGNOSIS — H90.2 CONDUCTIVE HEARING LOSS, UNILATERAL: ICD-10-CM

## 2021-08-19 DIAGNOSIS — R93.89 ABNORMAL CHEST X-RAY: ICD-10-CM

## 2021-08-19 DIAGNOSIS — M19.019 SHOULDER ARTHRITIS: ICD-10-CM

## 2021-08-19 DIAGNOSIS — R56.9 FOCAL SEIZURES: ICD-10-CM

## 2021-08-19 DIAGNOSIS — Z86.73 HISTORY OF STROKE: ICD-10-CM

## 2021-08-19 DIAGNOSIS — R76.8 ELEVATED IGE LEVEL: ICD-10-CM

## 2021-08-19 DIAGNOSIS — M51.36 DDD (DEGENERATIVE DISC DISEASE), LUMBAR: ICD-10-CM

## 2021-08-19 DIAGNOSIS — E78.2 MIXED HYPERLIPIDEMIA: ICD-10-CM

## 2021-08-19 DIAGNOSIS — E44.1 MILD PROTEIN-CALORIE MALNUTRITION: ICD-10-CM

## 2021-08-19 DIAGNOSIS — G89.29 CHRONIC LOW BACK PAIN WITH SCIATICA, SCIATICA LATERALITY UNSPECIFIED, UNSPECIFIED BACK PAIN LATERALITY: ICD-10-CM

## 2021-08-19 DIAGNOSIS — G56.03 BILATERAL CARPAL TUNNEL SYNDROME: ICD-10-CM

## 2021-08-19 DIAGNOSIS — R29.90 MULTIPLE NEUROLOGICAL SYMPTOMS: ICD-10-CM

## 2021-08-19 DIAGNOSIS — G89.4 CHRONIC PAIN SYNDROME: ICD-10-CM

## 2021-08-19 DIAGNOSIS — J30.9 CHRONIC ALLERGIC RHINITIS: ICD-10-CM

## 2021-08-19 DIAGNOSIS — N32.81 OVERACTIVE BLADDER: ICD-10-CM

## 2021-08-19 DIAGNOSIS — R55 SYNCOPE, UNSPECIFIED SYNCOPE TYPE: ICD-10-CM

## 2021-08-19 DIAGNOSIS — I95.1 ORTHOSTATIC HYPOTENSION: ICD-10-CM

## 2021-08-19 DIAGNOSIS — M48.062 SPINAL STENOSIS OF LUMBAR REGION WITH NEUROGENIC CLAUDICATION: ICD-10-CM

## 2021-08-19 DIAGNOSIS — Z79.899 POLYPHARMACY: ICD-10-CM

## 2021-08-19 DIAGNOSIS — D50.8 OTHER IRON DEFICIENCY ANEMIA: ICD-10-CM

## 2021-08-19 DIAGNOSIS — D64.9 NORMOCYTIC ANEMIA: ICD-10-CM

## 2021-08-19 DIAGNOSIS — M51.35 DDD (DEGENERATIVE DISC DISEASE), THORACOLUMBAR: ICD-10-CM

## 2021-08-19 DIAGNOSIS — F33.1 MODERATE RECURRENT MAJOR DEPRESSION: ICD-10-CM

## 2021-08-19 DIAGNOSIS — G40.909 SEIZURE DISORDER: ICD-10-CM

## 2021-08-19 DIAGNOSIS — Z98.890 HX OF CRANIOTOMY: ICD-10-CM

## 2021-08-19 DIAGNOSIS — I10 ESSENTIAL HYPERTENSION: ICD-10-CM

## 2021-08-19 DIAGNOSIS — Z86.73 HX OF COMPLETED STROKE: ICD-10-CM

## 2021-08-19 DIAGNOSIS — G40.109 FOCAL EPILEPSY: ICD-10-CM

## 2021-08-19 DIAGNOSIS — I15.2 HYPERTENSION ASSOCIATED WITH DIABETES: ICD-10-CM

## 2021-08-19 DIAGNOSIS — Z79.4 LONG TERM CURRENT USE OF INSULIN: ICD-10-CM

## 2021-08-19 DIAGNOSIS — M51.34 DDD (DEGENERATIVE DISC DISEASE), THORACIC: ICD-10-CM

## 2021-08-19 DIAGNOSIS — F32.A DEPRESSION, UNSPECIFIED DEPRESSION TYPE: ICD-10-CM

## 2021-08-19 DIAGNOSIS — E11.22 CONTROLLED TYPE 2 DIABETES MELLITUS WITH STAGE 3 CHRONIC KIDNEY DISEASE, WITHOUT LONG-TERM CURRENT USE OF INSULIN: ICD-10-CM

## 2021-08-19 DIAGNOSIS — M81.0 AGE-RELATED OSTEOPOROSIS WITHOUT CURRENT PATHOLOGICAL FRACTURE: ICD-10-CM

## 2021-08-19 DIAGNOSIS — R29.6 RECURRENT FALLS: ICD-10-CM

## 2021-08-19 DIAGNOSIS — M48.00 SPINAL STENOSIS, UNSPECIFIED SPINAL REGION: ICD-10-CM

## 2021-08-19 DIAGNOSIS — B02.29 POSTHERPETIC NEURALGIA: ICD-10-CM

## 2021-08-19 DIAGNOSIS — E55.9 VITAMIN D DEFICIENCY: ICD-10-CM

## 2021-08-19 DIAGNOSIS — M50.30 DDD (DEGENERATIVE DISC DISEASE), CERVICAL: ICD-10-CM

## 2021-08-19 DIAGNOSIS — E11.59 HYPERTENSION ASSOCIATED WITH DIABETES: ICD-10-CM

## 2021-08-19 DIAGNOSIS — I99.8 FLUCTUATING BLOOD PRESSURE: ICD-10-CM

## 2021-08-19 DIAGNOSIS — I25.10 CAD IN NATIVE ARTERY: ICD-10-CM

## 2021-08-19 DIAGNOSIS — M19.032 PRIMARY OSTEOARTHRITIS OF LEFT WRIST: ICD-10-CM

## 2021-08-19 DIAGNOSIS — F51.01 PRIMARY INSOMNIA: ICD-10-CM

## 2021-08-19 DIAGNOSIS — I25.118 CORONARY ARTERY DISEASE OF NATIVE ARTERY OF NATIVE HEART WITH STABLE ANGINA PECTORIS: ICD-10-CM

## 2021-08-19 DIAGNOSIS — M48.061 NEUROFORAMINAL STENOSIS OF LUMBAR SPINE: ICD-10-CM

## 2021-08-19 DIAGNOSIS — J41.1 MUCOPURULENT CHRONIC BRONCHITIS: Chronic | ICD-10-CM

## 2021-08-19 DIAGNOSIS — K21.9 GASTROESOPHAGEAL REFLUX DISEASE, UNSPECIFIED WHETHER ESOPHAGITIS PRESENT: ICD-10-CM

## 2021-08-19 DIAGNOSIS — Z98.890 HISTORY OF CEREBRAL ANEURYSM REPAIR: ICD-10-CM

## 2021-08-19 DIAGNOSIS — H72.91 PERFORATION OF RIGHT TYMPANIC MEMBRANE: ICD-10-CM

## 2021-08-19 DIAGNOSIS — G56.21 CUBITAL TUNNEL SYNDROME ON RIGHT: ICD-10-CM

## 2021-08-19 DIAGNOSIS — M54.16 LUMBAR RADICULOPATHY: ICD-10-CM

## 2021-08-19 DIAGNOSIS — E11.22 CONTROLLED TYPE 2 DIABETES MELLITUS WITH STAGE 3 CHRONIC KIDNEY DISEASE, WITHOUT LONG-TERM CURRENT USE OF INSULIN: Primary | ICD-10-CM

## 2021-08-19 DIAGNOSIS — K59.09 OTHER CONSTIPATION: ICD-10-CM

## 2021-08-19 DIAGNOSIS — Z86.79 HISTORY OF CEREBRAL ANEURYSM: ICD-10-CM

## 2021-08-19 DIAGNOSIS — R94.4 DECREASED GFR: ICD-10-CM

## 2021-08-19 DIAGNOSIS — M54.40 CHRONIC LOW BACK PAIN WITH SCIATICA, SCIATICA LATERALITY UNSPECIFIED, UNSPECIFIED BACK PAIN LATERALITY: ICD-10-CM

## 2021-08-19 DIAGNOSIS — R23.3 SPONTANEOUS ECCHYMOSIS: ICD-10-CM

## 2021-08-19 DIAGNOSIS — J84.112 UIP (USUAL INTERSTITIAL PNEUMONITIS): ICD-10-CM

## 2021-08-19 DIAGNOSIS — R53.81 DEBILITY: ICD-10-CM

## 2021-08-19 DIAGNOSIS — N18.30 CONTROLLED TYPE 2 DIABETES MELLITUS WITH STAGE 3 CHRONIC KIDNEY DISEASE, WITHOUT LONG-TERM CURRENT USE OF INSULIN: Primary | ICD-10-CM
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
## 2021-08-19 PROBLEM — M51.379 DDD (DEGENERATIVE DISC DISEASE), LUMBOSACRAL: Status: ACTIVE | Noted: 2021-08-19

## 2021-08-19 PROBLEM — M51.369 DDD (DEGENERATIVE DISC DISEASE), LUMBAR: Status: ACTIVE | Noted: 2021-08-19

## 2021-08-19 LAB — ERYTHROCYTE [SEDIMENTATION RATE] IN BLOOD BY WESTERGREN METHOD: 58 MM/HR (ref 0–20)

## 2021-08-19 PROCEDURE — 1100F PTFALLS ASSESS-DOCD GE2>/YR: CPT | Mod: CPTII,S$GLB,, | Performed by: PSYCHIATRY & NEUROLOGY

## 2021-08-19 PROCEDURE — 1125F AMNT PAIN NOTED PAIN PRSNT: CPT | Mod: CPTII,S$GLB,, | Performed by: PSYCHIATRY & NEUROLOGY

## 2021-08-19 PROCEDURE — 99999 PR PBB SHADOW E&M-EST. PATIENT-LVL V: ICD-10-PCS | Mod: PBBFAC,,, | Performed by: PSYCHIATRY & NEUROLOGY

## 2021-08-19 PROCEDURE — 1159F PR MEDICATION LIST DOCUMENTED IN MEDICAL RECORD: ICD-10-PCS | Mod: CPTII,S$GLB,, | Performed by: PSYCHIATRY & NEUROLOGY

## 2021-08-19 PROCEDURE — 1100F PR PT FALLS ASSESS DOC 2+ FALLS/FALL W/INJURY/YR: ICD-10-PCS | Mod: CPTII,S$GLB,, | Performed by: PSYCHIATRY & NEUROLOGY

## 2021-08-19 PROCEDURE — 99205 OFFICE O/P NEW HI 60 MIN: CPT | Mod: S$GLB,,, | Performed by: PSYCHIATRY & NEUROLOGY

## 2021-08-19 PROCEDURE — 3074F SYST BP LT 130 MM HG: CPT | Mod: CPTII,S$GLB,, | Performed by: PSYCHIATRY & NEUROLOGY

## 2021-08-19 PROCEDURE — 3288F FALL RISK ASSESSMENT DOCD: CPT | Mod: CPTII,S$GLB,, | Performed by: PSYCHIATRY & NEUROLOGY

## 2021-08-19 PROCEDURE — 3078F PR MOST RECENT DIASTOLIC BLOOD PRESSURE < 80 MM HG: ICD-10-PCS | Mod: CPTII,S$GLB,, | Performed by: PSYCHIATRY & NEUROLOGY

## 2021-08-19 PROCEDURE — 1159F MED LIST DOCD IN RCRD: CPT | Mod: CPTII,S$GLB,, | Performed by: PSYCHIATRY & NEUROLOGY

## 2021-08-19 PROCEDURE — 85651 RBC SED RATE NONAUTOMATED: CPT | Performed by: PSYCHIATRY & NEUROLOGY

## 2021-08-19 PROCEDURE — 3288F PR FALLS RISK ASSESSMENT DOCUMENTED: ICD-10-PCS | Mod: CPTII,S$GLB,, | Performed by: PSYCHIATRY & NEUROLOGY

## 2021-08-19 PROCEDURE — 99205 PR OFFICE/OUTPT VISIT, NEW, LEVL V, 60-74 MIN: ICD-10-PCS | Mod: S$GLB,,, | Performed by: PSYCHIATRY & NEUROLOGY

## 2021-08-19 PROCEDURE — 99417 PR PROLONGED SVC, OUTPT, W/WO DIRECT PT CONTACT,  EA ADDTL 15 MIN: ICD-10-PCS | Mod: S$GLB,,, | Performed by: PSYCHIATRY & NEUROLOGY

## 2021-08-19 PROCEDURE — 99417 PROLNG OP E/M EACH 15 MIN: CPT | Mod: S$GLB,,, | Performed by: PSYCHIATRY & NEUROLOGY

## 2021-08-19 PROCEDURE — 1125F PR PAIN SEVERITY QUANTIFIED, PAIN PRESENT: ICD-10-PCS | Mod: CPTII,S$GLB,, | Performed by: PSYCHIATRY & NEUROLOGY

## 2021-08-19 PROCEDURE — 36415 COLL VENOUS BLD VENIPUNCTURE: CPT | Performed by: PSYCHIATRY & NEUROLOGY

## 2021-08-19 PROCEDURE — 3078F DIAST BP <80 MM HG: CPT | Mod: CPTII,S$GLB,, | Performed by: PSYCHIATRY & NEUROLOGY

## 2021-08-19 PROCEDURE — 99999 PR PBB SHADOW E&M-EST. PATIENT-LVL V: CPT | Mod: PBBFAC,,, | Performed by: PSYCHIATRY & NEUROLOGY

## 2021-08-19 PROCEDURE — 3074F PR MOST RECENT SYSTOLIC BLOOD PRESSURE < 130 MM HG: ICD-10-PCS | Mod: CPTII,S$GLB,, | Performed by: PSYCHIATRY & NEUROLOGY

## 2021-08-20 ENCOUNTER — TELEPHONE (OUTPATIENT)
Dept: CARDIOLOGY | Facility: CLINIC | Age: 76
End: 2021-08-20

## 2021-08-20 DIAGNOSIS — I10 ESSENTIAL HYPERTENSION: ICD-10-CM

## 2021-08-20 DIAGNOSIS — J01.90 ACUTE SINUSITIS, RECURRENCE NOT SPECIFIED, UNSPECIFIED LOCATION: ICD-10-CM

## 2021-08-20 DIAGNOSIS — R00.2 PALPITATIONS: ICD-10-CM

## 2021-08-20 LAB
OHS CV EVENT MONITOR DAY: 2
OHS CV HOLTER LENGTH DECIMAL HOURS: 94.78
OHS CV HOLTER LENGTH HOURS: 46
OHS CV HOLTER LENGTH MINUTES: 47
OHS CV HOLTER SINUS AVERAGE HR: 95
OHS CV HOLTER SINUS MAX HR: 136
OHS CV HOLTER SINUS MIN HR: 75

## 2021-08-20 RX ORDER — LEVOFLOXACIN 500 MG/1
500 TABLET, FILM COATED ORAL DAILY
Qty: 10 TABLET | Refills: 0 | Status: CANCELLED | OUTPATIENT
Start: 2021-08-20 | End: 2021-08-30

## 2021-08-20 RX ORDER — METFORMIN HYDROCHLORIDE 500 MG/1
500 TABLET ORAL DAILY
Qty: 90 TABLET | Refills: 1 | OUTPATIENT
Start: 2021-08-20

## 2021-08-20 RX ORDER — METOPROLOL TARTRATE 50 MG/1
50 TABLET ORAL 2 TIMES DAILY
Qty: 60 TABLET | Refills: 3 | Status: SHIPPED | OUTPATIENT
Start: 2021-08-20 | End: 2022-04-08 | Stop reason: SDUPTHER

## 2021-08-20 RX ORDER — DILTIAZEM HYDROCHLORIDE EXTENDED-RELEASE TABLETS 120 MG/1
120 TABLET, EXTENDED RELEASE ORAL DAILY
Qty: 30 TABLET | Refills: 3 | Status: SHIPPED | OUTPATIENT
Start: 2021-08-20 | End: 2022-06-02

## 2021-08-23 RX ORDER — METFORMIN HYDROCHLORIDE 500 MG/1
500 TABLET ORAL DAILY
Qty: 90 TABLET | Refills: 0 | Status: SHIPPED | OUTPATIENT
Start: 2021-08-23 | End: 2021-11-29

## 2021-08-24 PROCEDURE — 95816 EEG AWAKE AND DROWSY: CPT | Mod: S$GLB,,, | Performed by: PSYCHIATRY & NEUROLOGY

## 2021-08-24 PROCEDURE — 95816 PR EEG,W/AWAKE & DROWSY RECORD: ICD-10-PCS | Mod: S$GLB,,, | Performed by: PSYCHIATRY & NEUROLOGY

## 2021-08-26 ENCOUNTER — TELEPHONE (OUTPATIENT)
Dept: NEUROLOGY | Facility: CLINIC | Age: 76
End: 2021-08-26

## 2021-08-26 DIAGNOSIS — R55 DROP ATTACK: Primary | ICD-10-CM

## 2021-08-31 ENCOUNTER — TELEPHONE (OUTPATIENT)
Dept: RHEUMATOLOGY | Facility: CLINIC | Age: 76
End: 2021-08-31

## 2021-09-01 ENCOUNTER — DOCUMENTATION ONLY (OUTPATIENT)
Dept: INTERNAL MEDICINE | Facility: CLINIC | Age: 76
End: 2021-09-01

## 2021-09-10 ENCOUNTER — OFFICE VISIT (OUTPATIENT)
Dept: CARDIOLOGY | Facility: CLINIC | Age: 76
End: 2021-09-10
Payer: MEDICARE

## 2021-09-10 VITALS
OXYGEN SATURATION: 100 % | DIASTOLIC BLOOD PRESSURE: 70 MMHG | BODY MASS INDEX: 25.08 KG/M2 | SYSTOLIC BLOOD PRESSURE: 140 MMHG | HEIGHT: 63 IN | HEART RATE: 80 BPM | WEIGHT: 141.56 LBS

## 2021-09-10 DIAGNOSIS — I25.118 CORONARY ARTERY DISEASE OF NATIVE ARTERY OF NATIVE HEART WITH STABLE ANGINA PECTORIS: ICD-10-CM

## 2021-09-10 DIAGNOSIS — Z86.73 HISTORY OF STROKE: ICD-10-CM

## 2021-09-10 DIAGNOSIS — I15.2 HYPERTENSION ASSOCIATED WITH DIABETES: ICD-10-CM

## 2021-09-10 DIAGNOSIS — I10 ESSENTIAL HYPERTENSION: ICD-10-CM

## 2021-09-10 DIAGNOSIS — E78.2 MIXED HYPERLIPIDEMIA: ICD-10-CM

## 2021-09-10 DIAGNOSIS — E11.22 CONTROLLED TYPE 2 DIABETES MELLITUS WITH STAGE 3 CHRONIC KIDNEY DISEASE, WITHOUT LONG-TERM CURRENT USE OF INSULIN: ICD-10-CM

## 2021-09-10 DIAGNOSIS — E11.59 HYPERTENSION ASSOCIATED WITH DIABETES: ICD-10-CM

## 2021-09-10 DIAGNOSIS — I25.10 CAD IN NATIVE ARTERY: Primary | ICD-10-CM

## 2021-09-10 DIAGNOSIS — I99.8 FLUCTUATING BLOOD PRESSURE: ICD-10-CM

## 2021-09-10 DIAGNOSIS — N18.30 CONTROLLED TYPE 2 DIABETES MELLITUS WITH STAGE 3 CHRONIC KIDNEY DISEASE, WITHOUT LONG-TERM CURRENT USE OF INSULIN: ICD-10-CM

## 2021-09-10 PROCEDURE — 99214 PR OFFICE/OUTPT VISIT, EST, LEVL IV, 30-39 MIN: ICD-10-PCS | Mod: HCNC,S$GLB,, | Performed by: INTERNAL MEDICINE

## 2021-09-10 PROCEDURE — 1101F PT FALLS ASSESS-DOCD LE1/YR: CPT | Mod: HCNC,CPTII,S$GLB, | Performed by: INTERNAL MEDICINE

## 2021-09-10 PROCEDURE — 3077F PR MOST RECENT SYSTOLIC BLOOD PRESSURE >= 140 MM HG: ICD-10-PCS | Mod: HCNC,CPTII,S$GLB, | Performed by: INTERNAL MEDICINE

## 2021-09-10 PROCEDURE — 1159F MED LIST DOCD IN RCRD: CPT | Mod: HCNC,CPTII,S$GLB, | Performed by: INTERNAL MEDICINE

## 2021-09-10 PROCEDURE — 1101F PR PT FALLS ASSESS DOC 0-1 FALLS W/OUT INJ PAST YR: ICD-10-PCS | Mod: HCNC,CPTII,S$GLB, | Performed by: INTERNAL MEDICINE

## 2021-09-10 PROCEDURE — 1126F AMNT PAIN NOTED NONE PRSNT: CPT | Mod: HCNC,CPTII,S$GLB, | Performed by: INTERNAL MEDICINE

## 2021-09-10 PROCEDURE — 99999 PR PBB SHADOW E&M-EST. PATIENT-LVL V: ICD-10-PCS | Mod: PBBFAC,HCNC,, | Performed by: INTERNAL MEDICINE

## 2021-09-10 PROCEDURE — 3288F FALL RISK ASSESSMENT DOCD: CPT | Mod: HCNC,CPTII,S$GLB, | Performed by: INTERNAL MEDICINE

## 2021-09-10 PROCEDURE — 1126F PR PAIN SEVERITY QUANTIFIED, NO PAIN PRESENT: ICD-10-PCS | Mod: HCNC,CPTII,S$GLB, | Performed by: INTERNAL MEDICINE

## 2021-09-10 PROCEDURE — 3288F PR FALLS RISK ASSESSMENT DOCUMENTED: ICD-10-PCS | Mod: HCNC,CPTII,S$GLB, | Performed by: INTERNAL MEDICINE

## 2021-09-10 PROCEDURE — 99214 OFFICE O/P EST MOD 30 MIN: CPT | Mod: HCNC,S$GLB,, | Performed by: INTERNAL MEDICINE

## 2021-09-10 PROCEDURE — 3078F PR MOST RECENT DIASTOLIC BLOOD PRESSURE < 80 MM HG: ICD-10-PCS | Mod: HCNC,CPTII,S$GLB, | Performed by: INTERNAL MEDICINE

## 2021-09-10 PROCEDURE — 3077F SYST BP >= 140 MM HG: CPT | Mod: HCNC,CPTII,S$GLB, | Performed by: INTERNAL MEDICINE

## 2021-09-10 PROCEDURE — 1159F PR MEDICATION LIST DOCUMENTED IN MEDICAL RECORD: ICD-10-PCS | Mod: HCNC,CPTII,S$GLB, | Performed by: INTERNAL MEDICINE

## 2021-09-10 PROCEDURE — 3078F DIAST BP <80 MM HG: CPT | Mod: HCNC,CPTII,S$GLB, | Performed by: INTERNAL MEDICINE

## 2021-09-10 PROCEDURE — 99999 PR PBB SHADOW E&M-EST. PATIENT-LVL V: CPT | Mod: PBBFAC,HCNC,, | Performed by: INTERNAL MEDICINE

## 2021-09-10 PROCEDURE — 1160F PR REVIEW ALL MEDS BY PRESCRIBER/CLIN PHARMACIST DOCUMENTED: ICD-10-PCS | Mod: HCNC,CPTII,S$GLB, | Performed by: INTERNAL MEDICINE

## 2021-09-10 PROCEDURE — 1160F RVW MEDS BY RX/DR IN RCRD: CPT | Mod: HCNC,CPTII,S$GLB, | Performed by: INTERNAL MEDICINE

## 2021-09-10 RX ORDER — HYDROCHLOROTHIAZIDE 25 MG/1
25 TABLET ORAL DAILY PRN
Qty: 30 TABLET | Refills: 3 | Status: SHIPPED | OUTPATIENT
Start: 2021-09-10 | End: 2022-01-12

## 2021-09-27 ENCOUNTER — TELEPHONE (OUTPATIENT)
Dept: NEUROLOGY | Facility: CLINIC | Age: 76
End: 2021-09-27

## 2021-09-27 PROCEDURE — 95726 EEG PHY/QHP>84 HR W/VEEG: CPT | Mod: HCNC,S$GLB,, | Performed by: PSYCHIATRY & NEUROLOGY

## 2021-09-27 PROCEDURE — 95726 PR EEG, W/VIDEO, CONT RECORD, CMPLT STDY, I&R, >84 HRS: ICD-10-PCS | Mod: HCNC,S$GLB,, | Performed by: PSYCHIATRY & NEUROLOGY

## 2021-10-02 ENCOUNTER — PATIENT OUTREACH (OUTPATIENT)
Dept: ADMINISTRATIVE | Facility: OTHER | Age: 76
End: 2021-10-02

## 2021-10-04 ENCOUNTER — OFFICE VISIT (OUTPATIENT)
Dept: UROLOGY | Facility: CLINIC | Age: 76
End: 2021-10-04
Payer: MEDICARE

## 2021-10-04 VITALS
SYSTOLIC BLOOD PRESSURE: 147 MMHG | WEIGHT: 141 LBS | BODY MASS INDEX: 24.98 KG/M2 | DIASTOLIC BLOOD PRESSURE: 69 MMHG | HEART RATE: 90 BPM

## 2021-10-04 DIAGNOSIS — N39.46 MIXED INCONTINENCE: ICD-10-CM

## 2021-10-04 DIAGNOSIS — N32.81 OVERACTIVE BLADDER: Primary | ICD-10-CM

## 2021-10-04 PROCEDURE — 3077F PR MOST RECENT SYSTOLIC BLOOD PRESSURE >= 140 MM HG: ICD-10-PCS | Mod: HCNC,CPTII,S$GLB, | Performed by: NURSE PRACTITIONER

## 2021-10-04 PROCEDURE — 99999 PR PBB SHADOW E&M-EST. PATIENT-LVL V: ICD-10-PCS | Mod: PBBFAC,HCNC,, | Performed by: NURSE PRACTITIONER

## 2021-10-04 PROCEDURE — 1126F AMNT PAIN NOTED NONE PRSNT: CPT | Mod: HCNC,CPTII,S$GLB, | Performed by: NURSE PRACTITIONER

## 2021-10-04 PROCEDURE — 99203 PR OFFICE/OUTPT VISIT, NEW, LEVL III, 30-44 MIN: ICD-10-PCS | Mod: HCNC,S$GLB,, | Performed by: NURSE PRACTITIONER

## 2021-10-04 PROCEDURE — 1159F PR MEDICATION LIST DOCUMENTED IN MEDICAL RECORD: ICD-10-PCS | Mod: HCNC,CPTII,S$GLB, | Performed by: NURSE PRACTITIONER

## 2021-10-04 PROCEDURE — 99999 PR PBB SHADOW E&M-EST. PATIENT-LVL V: CPT | Mod: PBBFAC,HCNC,, | Performed by: NURSE PRACTITIONER

## 2021-10-04 PROCEDURE — 1159F MED LIST DOCD IN RCRD: CPT | Mod: HCNC,CPTII,S$GLB, | Performed by: NURSE PRACTITIONER

## 2021-10-04 PROCEDURE — 1126F PR PAIN SEVERITY QUANTIFIED, NO PAIN PRESENT: ICD-10-PCS | Mod: HCNC,CPTII,S$GLB, | Performed by: NURSE PRACTITIONER

## 2021-10-04 PROCEDURE — 3077F SYST BP >= 140 MM HG: CPT | Mod: HCNC,CPTII,S$GLB, | Performed by: NURSE PRACTITIONER

## 2021-10-04 PROCEDURE — 99203 OFFICE O/P NEW LOW 30 MIN: CPT | Mod: HCNC,S$GLB,, | Performed by: NURSE PRACTITIONER

## 2021-10-04 PROCEDURE — 3078F PR MOST RECENT DIASTOLIC BLOOD PRESSURE < 80 MM HG: ICD-10-PCS | Mod: HCNC,CPTII,S$GLB, | Performed by: NURSE PRACTITIONER

## 2021-10-04 PROCEDURE — 3078F DIAST BP <80 MM HG: CPT | Mod: HCNC,CPTII,S$GLB, | Performed by: NURSE PRACTITIONER

## 2021-10-04 RX ORDER — MIRABEGRON 50 MG/1
50 TABLET, FILM COATED, EXTENDED RELEASE ORAL DAILY
Qty: 30 TABLET | Refills: 11 | Status: SHIPPED | OUTPATIENT
Start: 2021-10-04 | End: 2022-08-24

## 2021-10-11 ENCOUNTER — TELEPHONE (OUTPATIENT)
Dept: NEUROLOGY | Facility: CLINIC | Age: 76
End: 2021-10-11

## 2021-10-13 ENCOUNTER — OFFICE VISIT (OUTPATIENT)
Dept: NEUROLOGY | Facility: CLINIC | Age: 76
End: 2021-10-13
Payer: MEDICARE

## 2021-10-13 VITALS
SYSTOLIC BLOOD PRESSURE: 118 MMHG | HEART RATE: 78 BPM | RESPIRATION RATE: 16 BRPM | HEIGHT: 63 IN | DIASTOLIC BLOOD PRESSURE: 76 MMHG | BODY MASS INDEX: 24.98 KG/M2 | OXYGEN SATURATION: 99 %

## 2021-10-13 DIAGNOSIS — D50.8 OTHER IRON DEFICIENCY ANEMIA: ICD-10-CM

## 2021-10-13 DIAGNOSIS — M54.12 CERVICAL RADICULOPATHY: ICD-10-CM

## 2021-10-13 DIAGNOSIS — M51.35 DDD (DEGENERATIVE DISC DISEASE), THORACOLUMBAR: ICD-10-CM

## 2021-10-13 DIAGNOSIS — J34.89 NASAL SEPTAL PERFORATION: ICD-10-CM

## 2021-10-13 DIAGNOSIS — K21.9 GASTROESOPHAGEAL REFLUX DISEASE, UNSPECIFIED WHETHER ESOPHAGITIS PRESENT: ICD-10-CM

## 2021-10-13 DIAGNOSIS — M81.0 AGE-RELATED OSTEOPOROSIS WITHOUT CURRENT PATHOLOGICAL FRACTURE: ICD-10-CM

## 2021-10-13 DIAGNOSIS — Z86.73 HISTORY OF STROKE: ICD-10-CM

## 2021-10-13 DIAGNOSIS — I99.8 FLUCTUATING BLOOD PRESSURE: ICD-10-CM

## 2021-10-13 DIAGNOSIS — F32.A DEPRESSION, UNSPECIFIED DEPRESSION TYPE: ICD-10-CM

## 2021-10-13 DIAGNOSIS — G40.109 TEMPORAL LOBE EPILEPSY: ICD-10-CM

## 2021-10-13 DIAGNOSIS — M51.37 DDD (DEGENERATIVE DISC DISEASE), LUMBOSACRAL: ICD-10-CM

## 2021-10-13 DIAGNOSIS — I15.2 HYPERTENSION ASSOCIATED WITH DIABETES: ICD-10-CM

## 2021-10-13 DIAGNOSIS — G40.109 TEMPORAL LOBE SEIZURE: ICD-10-CM

## 2021-10-13 DIAGNOSIS — Z79.899 POLYPHARMACY: ICD-10-CM

## 2021-10-13 DIAGNOSIS — R94.4 DECREASED GFR: ICD-10-CM

## 2021-10-13 DIAGNOSIS — M19.019 SHOULDER ARTHRITIS: ICD-10-CM

## 2021-10-13 DIAGNOSIS — M50.30 DDD (DEGENERATIVE DISC DISEASE), CERVICAL: ICD-10-CM

## 2021-10-13 DIAGNOSIS — H90.2 CONDUCTIVE HEARING LOSS, UNILATERAL: ICD-10-CM

## 2021-10-13 DIAGNOSIS — M54.16 LUMBAR RADICULOPATHY: ICD-10-CM

## 2021-10-13 DIAGNOSIS — E11.65 TYPE 2 DIABETES MELLITUS WITH HYPERGLYCEMIA, WITH LONG-TERM CURRENT USE OF INSULIN: ICD-10-CM

## 2021-10-13 DIAGNOSIS — Z87.891 HISTORY OF TOBACCO ABUSE: ICD-10-CM

## 2021-10-13 DIAGNOSIS — J41.1 MUCOPURULENT CHRONIC BRONCHITIS: Chronic | ICD-10-CM

## 2021-10-13 DIAGNOSIS — I95.1 ORTHOSTATIC HYPOTENSION: ICD-10-CM

## 2021-10-13 DIAGNOSIS — E11.59 HYPERTENSION ASSOCIATED WITH DIABETES: ICD-10-CM

## 2021-10-13 DIAGNOSIS — M48.061 NEUROFORAMINAL STENOSIS OF LUMBAR SPINE: ICD-10-CM

## 2021-10-13 DIAGNOSIS — N18.30 CONTROLLED TYPE 2 DIABETES MELLITUS WITH STAGE 3 CHRONIC KIDNEY DISEASE, WITHOUT LONG-TERM CURRENT USE OF INSULIN: ICD-10-CM

## 2021-10-13 DIAGNOSIS — J30.9 CHRONIC ALLERGIC RHINITIS: ICD-10-CM

## 2021-10-13 DIAGNOSIS — I25.118 CORONARY ARTERY DISEASE OF NATIVE ARTERY OF NATIVE HEART WITH STABLE ANGINA PECTORIS: ICD-10-CM

## 2021-10-13 DIAGNOSIS — Z79.4 TYPE 2 DIABETES MELLITUS WITH HYPERGLYCEMIA, WITH LONG-TERM CURRENT USE OF INSULIN: ICD-10-CM

## 2021-10-13 DIAGNOSIS — M51.36 DDD (DEGENERATIVE DISC DISEASE), LUMBAR: ICD-10-CM

## 2021-10-13 DIAGNOSIS — D64.9 NORMOCYTIC ANEMIA: ICD-10-CM

## 2021-10-13 DIAGNOSIS — F33.1 MODERATE RECURRENT MAJOR DEPRESSION: ICD-10-CM

## 2021-10-13 DIAGNOSIS — M54.40 CHRONIC LOW BACK PAIN WITH SCIATICA, SCIATICA LATERALITY UNSPECIFIED, UNSPECIFIED BACK PAIN LATERALITY: ICD-10-CM

## 2021-10-13 DIAGNOSIS — E55.9 VITAMIN D DEFICIENCY: ICD-10-CM

## 2021-10-13 DIAGNOSIS — Z86.73 HX OF COMPLETED STROKE: ICD-10-CM

## 2021-10-13 DIAGNOSIS — G89.29 CHRONIC LOW BACK PAIN WITH SCIATICA, SCIATICA LATERALITY UNSPECIFIED, UNSPECIFIED BACK PAIN LATERALITY: ICD-10-CM

## 2021-10-13 DIAGNOSIS — Z98.890 HISTORY OF CEREBRAL ANEURYSM REPAIR: ICD-10-CM

## 2021-10-13 DIAGNOSIS — I10 PRIMARY HYPERTENSION: ICD-10-CM

## 2021-10-13 DIAGNOSIS — M51.34 DDD (DEGENERATIVE DISC DISEASE), THORACIC: ICD-10-CM

## 2021-10-13 DIAGNOSIS — G56.03 BILATERAL CARPAL TUNNEL SYNDROME: ICD-10-CM

## 2021-10-13 DIAGNOSIS — H66.3X9 CHRONIC SUPPURATIVE OTITIS MEDIA, UNSPECIFIED LATERALITY, UNSPECIFIED OTITIS MEDIA LOCATION: ICD-10-CM

## 2021-10-13 DIAGNOSIS — M48.062 SPINAL STENOSIS OF LUMBAR REGION WITH NEUROGENIC CLAUDICATION: ICD-10-CM

## 2021-10-13 DIAGNOSIS — E11.22 CONTROLLED TYPE 2 DIABETES MELLITUS WITH STAGE 3 CHRONIC KIDNEY DISEASE, WITHOUT LONG-TERM CURRENT USE OF INSULIN: ICD-10-CM

## 2021-10-13 DIAGNOSIS — R55 SYNCOPE, UNSPECIFIED SYNCOPE TYPE: ICD-10-CM

## 2021-10-13 DIAGNOSIS — E78.2 MIXED HYPERLIPIDEMIA: ICD-10-CM

## 2021-10-13 DIAGNOSIS — R29.90 MULTIPLE NEUROLOGICAL SYMPTOMS: Primary | ICD-10-CM

## 2021-10-13 DIAGNOSIS — Z86.79 HISTORY OF CEREBRAL ANEURYSM REPAIR: ICD-10-CM

## 2021-10-13 DIAGNOSIS — N32.81 OVERACTIVE BLADDER: ICD-10-CM

## 2021-10-13 DIAGNOSIS — Z98.890 HX OF CRANIOTOMY: ICD-10-CM

## 2021-10-13 DIAGNOSIS — G89.4 CHRONIC PAIN SYNDROME: ICD-10-CM

## 2021-10-13 DIAGNOSIS — G31.84 MILD COGNITIVE IMPAIRMENT: ICD-10-CM

## 2021-10-13 DIAGNOSIS — I25.10 CAD IN NATIVE ARTERY: ICD-10-CM

## 2021-10-13 DIAGNOSIS — J84.112 UIP (USUAL INTERSTITIAL PNEUMONITIS): ICD-10-CM

## 2021-10-13 DIAGNOSIS — B02.29 POSTHERPETIC NEURALGIA: ICD-10-CM

## 2021-10-13 DIAGNOSIS — M19.032 PRIMARY OSTEOARTHRITIS OF LEFT WRIST: ICD-10-CM

## 2021-10-13 DIAGNOSIS — M48.00 SPINAL STENOSIS, UNSPECIFIED SPINAL REGION: ICD-10-CM

## 2021-10-13 DIAGNOSIS — Z86.79 HISTORY OF CEREBRAL ANEURYSM: ICD-10-CM
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
## 2021-10-13 PROBLEM — R56.9 FOCAL SEIZURES: Status: RESOLVED | Noted: 2018-09-12 | Resolved: 2021-10-13

## 2021-10-13 PROBLEM — G40.909 SEIZURE DISORDER: Status: RESOLVED | Noted: 2019-12-23 | Resolved: 2021-10-13

## 2021-10-13 PROCEDURE — 3288F FALL RISK ASSESSMENT DOCD: CPT | Mod: HCNC,CPTII,S$GLB, | Performed by: NURSE PRACTITIONER

## 2021-10-13 PROCEDURE — 3074F PR MOST RECENT SYSTOLIC BLOOD PRESSURE < 130 MM HG: ICD-10-PCS | Mod: HCNC,CPTII,S$GLB, | Performed by: NURSE PRACTITIONER

## 2021-10-13 PROCEDURE — 1159F MED LIST DOCD IN RCRD: CPT | Mod: HCNC,CPTII,S$GLB, | Performed by: NURSE PRACTITIONER

## 2021-10-13 PROCEDURE — 1101F PR PT FALLS ASSESS DOC 0-1 FALLS W/OUT INJ PAST YR: ICD-10-PCS | Mod: HCNC,CPTII,S$GLB, | Performed by: NURSE PRACTITIONER

## 2021-10-13 PROCEDURE — 99215 PR OFFICE/OUTPT VISIT, EST, LEVL V, 40-54 MIN: ICD-10-PCS | Mod: HCNC,S$GLB,, | Performed by: NURSE PRACTITIONER

## 2021-10-13 PROCEDURE — 99215 OFFICE O/P EST HI 40 MIN: CPT | Mod: HCNC,S$GLB,, | Performed by: NURSE PRACTITIONER

## 2021-10-13 PROCEDURE — 1160F PR REVIEW ALL MEDS BY PRESCRIBER/CLIN PHARMACIST DOCUMENTED: ICD-10-PCS | Mod: HCNC,CPTII,S$GLB, | Performed by: NURSE PRACTITIONER

## 2021-10-13 PROCEDURE — 99999 PR PBB SHADOW E&M-EST. PATIENT-LVL V: ICD-10-PCS | Mod: PBBFAC,HCNC,, | Performed by: NURSE PRACTITIONER

## 2021-10-13 PROCEDURE — 3078F PR MOST RECENT DIASTOLIC BLOOD PRESSURE < 80 MM HG: ICD-10-PCS | Mod: HCNC,CPTII,S$GLB, | Performed by: NURSE PRACTITIONER

## 2021-10-13 PROCEDURE — 3078F DIAST BP <80 MM HG: CPT | Mod: HCNC,CPTII,S$GLB, | Performed by: NURSE PRACTITIONER

## 2021-10-13 PROCEDURE — 1125F AMNT PAIN NOTED PAIN PRSNT: CPT | Mod: HCNC,CPTII,S$GLB, | Performed by: NURSE PRACTITIONER

## 2021-10-13 PROCEDURE — 1159F PR MEDICATION LIST DOCUMENTED IN MEDICAL RECORD: ICD-10-PCS | Mod: HCNC,CPTII,S$GLB, | Performed by: NURSE PRACTITIONER

## 2021-10-13 PROCEDURE — 99417 PR PROLONGED SVC, OUTPT, W/WO DIRECT PT CONTACT,  EA ADDTL 15 MIN: ICD-10-PCS | Mod: HCNC,S$GLB,, | Performed by: NURSE PRACTITIONER

## 2021-10-13 PROCEDURE — 1101F PT FALLS ASSESS-DOCD LE1/YR: CPT | Mod: HCNC,CPTII,S$GLB, | Performed by: NURSE PRACTITIONER

## 2021-10-13 PROCEDURE — 3074F SYST BP LT 130 MM HG: CPT | Mod: HCNC,CPTII,S$GLB, | Performed by: NURSE PRACTITIONER

## 2021-10-13 PROCEDURE — 99417 PROLNG OP E/M EACH 15 MIN: CPT | Mod: HCNC,S$GLB,, | Performed by: NURSE PRACTITIONER

## 2021-10-13 PROCEDURE — 3288F PR FALLS RISK ASSESSMENT DOCUMENTED: ICD-10-PCS | Mod: HCNC,CPTII,S$GLB, | Performed by: NURSE PRACTITIONER

## 2021-10-13 PROCEDURE — 99999 PR PBB SHADOW E&M-EST. PATIENT-LVL V: CPT | Mod: PBBFAC,HCNC,, | Performed by: NURSE PRACTITIONER

## 2021-10-13 PROCEDURE — 1125F PR PAIN SEVERITY QUANTIFIED, PAIN PRESENT: ICD-10-PCS | Mod: HCNC,CPTII,S$GLB, | Performed by: NURSE PRACTITIONER

## 2021-10-13 PROCEDURE — 1160F RVW MEDS BY RX/DR IN RCRD: CPT | Mod: HCNC,CPTII,S$GLB, | Performed by: NURSE PRACTITIONER

## 2021-10-13 RX ORDER — ESLICARBAZEPINE ACETATE 800 MG/1
1200 TABLET ORAL NIGHTLY
Qty: 90 TABLET | Refills: 3 | Status: SHIPPED | OUTPATIENT
Start: 2021-10-13 | End: 2022-02-21

## 2021-10-13 RX ORDER — GABAPENTIN 300 MG/1
300 CAPSULE ORAL 2 TIMES DAILY
Qty: 180 CAPSULE | Refills: 3 | Status: CANCELLED | OUTPATIENT
Start: 2021-10-13 | End: 2022-10-13

## 2021-10-14 ENCOUNTER — TELEPHONE (OUTPATIENT)
Dept: NEUROLOGY | Facility: CLINIC | Age: 76
End: 2021-10-14

## 2021-10-14 ENCOUNTER — TELEPHONE (OUTPATIENT)
Dept: RHEUMATOLOGY | Facility: CLINIC | Age: 76
End: 2021-10-14

## 2021-10-18 ENCOUNTER — PATIENT MESSAGE (OUTPATIENT)
Dept: ADMINISTRATIVE | Facility: HOSPITAL | Age: 76
End: 2021-10-18
Payer: MEDICARE

## 2021-10-21 ENCOUNTER — TELEPHONE (OUTPATIENT)
Dept: ORTHOPEDICS | Facility: CLINIC | Age: 76
End: 2021-10-21

## 2021-10-27 ENCOUNTER — HOSPITAL ENCOUNTER (OUTPATIENT)
Dept: RADIOLOGY | Facility: HOSPITAL | Age: 76
Discharge: HOME | End: 2021-10-27
Attending: PSYCHIATRY & NEUROLOGY
Payer: MEDICARE

## 2021-10-27 ENCOUNTER — TELEPHONE (OUTPATIENT)
Dept: UROLOGY | Facility: CLINIC | Age: 76
End: 2021-10-27
Payer: MEDICARE

## 2021-10-27 DIAGNOSIS — R94.4 DECREASED GFR: ICD-10-CM

## 2021-10-27 DIAGNOSIS — G89.4 CHRONIC PAIN SYNDROME: ICD-10-CM

## 2021-10-27 DIAGNOSIS — F51.01 PRIMARY INSOMNIA: ICD-10-CM

## 2021-10-27 DIAGNOSIS — J41.1 MUCOPURULENT CHRONIC BRONCHITIS: ICD-10-CM

## 2021-10-27 DIAGNOSIS — M54.40 CHRONIC LOW BACK PAIN WITH SCIATICA, SCIATICA LATERALITY UNSPECIFIED, UNSPECIFIED BACK PAIN LATERALITY: ICD-10-CM

## 2021-10-27 DIAGNOSIS — E78.2 MIXED HYPERLIPIDEMIA: ICD-10-CM

## 2021-10-27 DIAGNOSIS — G89.29 CHRONIC LOW BACK PAIN WITH SCIATICA, SCIATICA LATERALITY UNSPECIFIED, UNSPECIFIED BACK PAIN LATERALITY: ICD-10-CM

## 2021-10-27 DIAGNOSIS — J84.112 UIP (USUAL INTERSTITIAL PNEUMONITIS): ICD-10-CM

## 2021-10-27 DIAGNOSIS — Z87.891 HISTORY OF TOBACCO ABUSE: ICD-10-CM

## 2021-10-27 DIAGNOSIS — B37.0 THRUSH, ORAL: ICD-10-CM

## 2021-10-27 DIAGNOSIS — R53.81 DEBILITY: ICD-10-CM

## 2021-10-27 DIAGNOSIS — H90.2 CONDUCTIVE HEARING LOSS, UNILATERAL: ICD-10-CM

## 2021-10-27 DIAGNOSIS — M51.34 DDD (DEGENERATIVE DISC DISEASE), THORACIC: ICD-10-CM

## 2021-10-27 DIAGNOSIS — F32.A DEPRESSION, UNSPECIFIED DEPRESSION TYPE: ICD-10-CM

## 2021-10-27 DIAGNOSIS — I95.1 ORTHOSTATIC HYPOTENSION: ICD-10-CM

## 2021-10-27 DIAGNOSIS — I15.2 HYPERTENSION ASSOCIATED WITH DIABETES: ICD-10-CM

## 2021-10-27 DIAGNOSIS — G40.109 FOCAL EPILEPSY: ICD-10-CM

## 2021-10-27 DIAGNOSIS — M48.061 NEUROFORAMINAL STENOSIS OF LUMBAR SPINE: ICD-10-CM

## 2021-10-27 DIAGNOSIS — R55 SYNCOPE, UNSPECIFIED SYNCOPE TYPE: ICD-10-CM

## 2021-10-27 DIAGNOSIS — Z98.890 HISTORY OF CEREBRAL ANEURYSM REPAIR: ICD-10-CM

## 2021-10-27 DIAGNOSIS — R93.89 ABNORMAL CHEST X-RAY: ICD-10-CM

## 2021-10-27 DIAGNOSIS — J30.9 CHRONIC ALLERGIC RHINITIS: ICD-10-CM

## 2021-10-27 DIAGNOSIS — E11.59 HYPERTENSION ASSOCIATED WITH DIABETES: ICD-10-CM

## 2021-10-27 DIAGNOSIS — Z79.4 LONG TERM CURRENT USE OF INSULIN: ICD-10-CM

## 2021-10-27 DIAGNOSIS — M51.37 DDD (DEGENERATIVE DISC DISEASE), LUMBOSACRAL: ICD-10-CM

## 2021-10-27 DIAGNOSIS — M54.12 CERVICAL RADICULOPATHY: ICD-10-CM

## 2021-10-27 DIAGNOSIS — Z98.890 HX OF CRANIOTOMY: ICD-10-CM

## 2021-10-27 DIAGNOSIS — Z86.79 HISTORY OF CEREBRAL ANEURYSM REPAIR: ICD-10-CM

## 2021-10-27 DIAGNOSIS — R23.3 SPONTANEOUS ECCHYMOSIS: ICD-10-CM

## 2021-10-27 DIAGNOSIS — K59.09 OTHER CONSTIPATION: ICD-10-CM

## 2021-10-27 DIAGNOSIS — R29.6 RECURRENT FALLS: ICD-10-CM

## 2021-10-27 DIAGNOSIS — I25.118 CORONARY ARTERY DISEASE OF NATIVE ARTERY OF NATIVE HEART WITH STABLE ANGINA PECTORIS: ICD-10-CM
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
## 2021-10-27 DIAGNOSIS — E11.65 TYPE 2 DIABETES MELLITUS WITH HYPERGLYCEMIA, WITH LONG-TERM CURRENT USE OF INSULIN: ICD-10-CM

## 2021-10-27 DIAGNOSIS — M48.00 SPINAL STENOSIS, UNSPECIFIED SPINAL REGION: ICD-10-CM

## 2021-10-27 DIAGNOSIS — M48.062 SPINAL STENOSIS OF LUMBAR REGION WITH NEUROGENIC CLAUDICATION: ICD-10-CM

## 2021-10-27 DIAGNOSIS — Z86.79 HISTORY OF CEREBRAL ANEURYSM: ICD-10-CM

## 2021-10-27 DIAGNOSIS — G56.03 BILATERAL CARPAL TUNNEL SYNDROME: ICD-10-CM

## 2021-10-27 DIAGNOSIS — M51.36 DDD (DEGENERATIVE DISC DISEASE), LUMBAR: ICD-10-CM

## 2021-10-27 DIAGNOSIS — R76.8 ELEVATED IGE LEVEL: ICD-10-CM

## 2021-10-27 DIAGNOSIS — Z86.73 HISTORY OF STROKE: ICD-10-CM

## 2021-10-27 DIAGNOSIS — I25.10 CAD IN NATIVE ARTERY: ICD-10-CM

## 2021-10-27 DIAGNOSIS — M81.0 AGE-RELATED OSTEOPOROSIS WITHOUT CURRENT PATHOLOGICAL FRACTURE: ICD-10-CM

## 2021-10-27 DIAGNOSIS — Z79.4 TYPE 2 DIABETES MELLITUS WITH HYPERGLYCEMIA, WITH LONG-TERM CURRENT USE OF INSULIN: ICD-10-CM

## 2021-10-27 DIAGNOSIS — M50.30 DDD (DEGENERATIVE DISC DISEASE), CERVICAL: ICD-10-CM

## 2021-10-27 DIAGNOSIS — M51.35 DDD (DEGENERATIVE DISC DISEASE), THORACOLUMBAR: ICD-10-CM

## 2021-10-27 DIAGNOSIS — G40.909 SEIZURE DISORDER: ICD-10-CM

## 2021-10-27 DIAGNOSIS — E55.9 VITAMIN D DEFICIENCY: ICD-10-CM

## 2021-10-27 DIAGNOSIS — R56.9 FOCAL SEIZURES: ICD-10-CM

## 2021-10-27 DIAGNOSIS — E44.1 MILD PROTEIN-CALORIE MALNUTRITION: ICD-10-CM

## 2021-10-27 DIAGNOSIS — J34.89 NASAL SEPTAL PERFORATION: ICD-10-CM

## 2021-10-27 DIAGNOSIS — H72.91 PERFORATION OF RIGHT TYMPANIC MEMBRANE: ICD-10-CM

## 2021-10-27 DIAGNOSIS — H66.3X9 CHRONIC SUPPURATIVE OTITIS MEDIA, UNSPECIFIED LATERALITY, UNSPECIFIED OTITIS MEDIA LOCATION: ICD-10-CM

## 2021-10-27 DIAGNOSIS — R26.89 IMBALANCE: ICD-10-CM

## 2021-10-27 DIAGNOSIS — D50.8 OTHER IRON DEFICIENCY ANEMIA: ICD-10-CM

## 2021-10-27 DIAGNOSIS — E11.22 CONTROLLED TYPE 2 DIABETES MELLITUS WITH STAGE 3 CHRONIC KIDNEY DISEASE, WITHOUT LONG-TERM CURRENT USE OF INSULIN: ICD-10-CM

## 2021-10-27 DIAGNOSIS — N32.81 OVERACTIVE BLADDER: ICD-10-CM

## 2021-10-27 DIAGNOSIS — M19.032 PRIMARY OSTEOARTHRITIS OF LEFT WRIST: ICD-10-CM

## 2021-10-27 DIAGNOSIS — Z79.899 POLYPHARMACY: ICD-10-CM

## 2021-10-27 DIAGNOSIS — I10 ESSENTIAL HYPERTENSION: ICD-10-CM

## 2021-10-27 DIAGNOSIS — R29.90 MULTIPLE NEUROLOGICAL SYMPTOMS: ICD-10-CM

## 2021-10-27 DIAGNOSIS — B02.29 POSTHERPETIC NEURALGIA: ICD-10-CM

## 2021-10-27 DIAGNOSIS — M54.16 LUMBAR RADICULOPATHY: ICD-10-CM

## 2021-10-27 DIAGNOSIS — F33.1 MODERATE RECURRENT MAJOR DEPRESSION: ICD-10-CM

## 2021-10-27 DIAGNOSIS — I99.8 FLUCTUATING BLOOD PRESSURE: ICD-10-CM

## 2021-10-27 DIAGNOSIS — M19.019 SHOULDER ARTHRITIS: ICD-10-CM

## 2021-10-27 DIAGNOSIS — N18.30 CONTROLLED TYPE 2 DIABETES MELLITUS WITH STAGE 3 CHRONIC KIDNEY DISEASE, WITHOUT LONG-TERM CURRENT USE OF INSULIN: ICD-10-CM

## 2021-10-27 DIAGNOSIS — D64.9 NORMOCYTIC ANEMIA: ICD-10-CM

## 2021-10-27 DIAGNOSIS — R56.9 CONVULSIONS, UNSPECIFIED CONVULSION TYPE: ICD-10-CM

## 2021-10-27 DIAGNOSIS — G56.21 CUBITAL TUNNEL SYNDROME ON RIGHT: ICD-10-CM

## 2021-10-27 DIAGNOSIS — Z86.73 HX OF COMPLETED STROKE: ICD-10-CM

## 2021-10-27 DIAGNOSIS — K21.9 GASTROESOPHAGEAL REFLUX DISEASE, UNSPECIFIED WHETHER ESOPHAGITIS PRESENT: ICD-10-CM

## 2021-10-27 PROCEDURE — 25500020 PHARM REV CODE 255: Mod: HCNC | Performed by: PSYCHIATRY & NEUROLOGY

## 2021-10-27 PROCEDURE — 70496 CT ANGIOGRAPHY HEAD: CPT | Mod: TC,HCNC

## 2021-10-27 RX ADMIN — IOHEXOL 100 ML: 350 INJECTION, SOLUTION INTRAVENOUS at 11:10

## 2021-10-28 ENCOUNTER — TELEPHONE (OUTPATIENT)
Dept: PAIN MEDICINE | Facility: CLINIC | Age: 76
End: 2021-10-28
Payer: MEDICARE

## 2021-11-02 ENCOUNTER — TELEPHONE (OUTPATIENT)
Dept: NEUROLOGY | Facility: CLINIC | Age: 76
End: 2021-11-02
Payer: MEDICARE

## 2021-11-02 ENCOUNTER — OFFICE VISIT (OUTPATIENT)
Dept: ORTHOPEDICS | Facility: CLINIC | Age: 76
End: 2021-11-02
Payer: MEDICARE

## 2021-11-02 VITALS
HEIGHT: 63 IN | HEART RATE: 87 BPM | SYSTOLIC BLOOD PRESSURE: 143 MMHG | BODY MASS INDEX: 24.98 KG/M2 | DIASTOLIC BLOOD PRESSURE: 73 MMHG | WEIGHT: 141 LBS

## 2021-11-02 DIAGNOSIS — G56.03 CARPAL TUNNEL SYNDROME ON BOTH SIDES: ICD-10-CM

## 2021-11-02 DIAGNOSIS — M18.0 ARTHRITIS OF CARPOMETACARPAL (CMC) JOINT OF BOTH THUMBS: Primary | ICD-10-CM

## 2021-11-02 PROCEDURE — 1160F RVW MEDS BY RX/DR IN RCRD: CPT | Mod: HCNC,CPTII,S$GLB, | Performed by: ORTHOPAEDIC SURGERY

## 2021-11-02 PROCEDURE — 3077F SYST BP >= 140 MM HG: CPT | Mod: HCNC,CPTII,S$GLB, | Performed by: ORTHOPAEDIC SURGERY

## 2021-11-02 PROCEDURE — 3078F DIAST BP <80 MM HG: CPT | Mod: HCNC,CPTII,S$GLB, | Performed by: ORTHOPAEDIC SURGERY

## 2021-11-02 PROCEDURE — 20526 CARPAL TUNNEL: ICD-10-PCS | Mod: 50,HCNC,S$GLB, | Performed by: ORTHOPAEDIC SURGERY

## 2021-11-02 PROCEDURE — 20526 THER INJECTION CARP TUNNEL: CPT | Mod: 50,HCNC,S$GLB, | Performed by: ORTHOPAEDIC SURGERY

## 2021-11-02 PROCEDURE — 99999 PR PBB SHADOW E&M-EST. PATIENT-LVL IV: ICD-10-PCS | Mod: PBBFAC,HCNC,, | Performed by: ORTHOPAEDIC SURGERY

## 2021-11-02 PROCEDURE — 3077F PR MOST RECENT SYSTOLIC BLOOD PRESSURE >= 140 MM HG: ICD-10-PCS | Mod: HCNC,CPTII,S$GLB, | Performed by: ORTHOPAEDIC SURGERY

## 2021-11-02 PROCEDURE — 3288F FALL RISK ASSESSMENT DOCD: CPT | Mod: HCNC,CPTII,S$GLB, | Performed by: ORTHOPAEDIC SURGERY

## 2021-11-02 PROCEDURE — 1125F AMNT PAIN NOTED PAIN PRSNT: CPT | Mod: HCNC,CPTII,S$GLB, | Performed by: ORTHOPAEDIC SURGERY

## 2021-11-02 PROCEDURE — 99213 OFFICE O/P EST LOW 20 MIN: CPT | Mod: 25,HCNC,S$GLB, | Performed by: ORTHOPAEDIC SURGERY

## 2021-11-02 PROCEDURE — 1159F PR MEDICATION LIST DOCUMENTED IN MEDICAL RECORD: ICD-10-PCS | Mod: HCNC,CPTII,S$GLB, | Performed by: ORTHOPAEDIC SURGERY

## 2021-11-02 PROCEDURE — 1101F PR PT FALLS ASSESS DOC 0-1 FALLS W/OUT INJ PAST YR: ICD-10-PCS | Mod: HCNC,CPTII,S$GLB, | Performed by: ORTHOPAEDIC SURGERY

## 2021-11-02 PROCEDURE — 1101F PT FALLS ASSESS-DOCD LE1/YR: CPT | Mod: HCNC,CPTII,S$GLB, | Performed by: ORTHOPAEDIC SURGERY

## 2021-11-02 PROCEDURE — 1160F PR REVIEW ALL MEDS BY PRESCRIBER/CLIN PHARMACIST DOCUMENTED: ICD-10-PCS | Mod: HCNC,CPTII,S$GLB, | Performed by: ORTHOPAEDIC SURGERY

## 2021-11-02 PROCEDURE — 99213 PR OFFICE/OUTPT VISIT, EST, LEVL III, 20-29 MIN: ICD-10-PCS | Mod: 25,HCNC,S$GLB, | Performed by: ORTHOPAEDIC SURGERY

## 2021-11-02 PROCEDURE — 20600 SMALL JOINT ASPIRATION/INJECTION: R THUMB CMC, L THUMB CMC: ICD-10-PCS | Mod: 51,50,HCNC,S$GLB | Performed by: ORTHOPAEDIC SURGERY

## 2021-11-02 PROCEDURE — 99999 PR PBB SHADOW E&M-EST. PATIENT-LVL IV: CPT | Mod: PBBFAC,HCNC,, | Performed by: ORTHOPAEDIC SURGERY

## 2021-11-02 PROCEDURE — 3288F PR FALLS RISK ASSESSMENT DOCUMENTED: ICD-10-PCS | Mod: HCNC,CPTII,S$GLB, | Performed by: ORTHOPAEDIC SURGERY

## 2021-11-02 PROCEDURE — 3078F PR MOST RECENT DIASTOLIC BLOOD PRESSURE < 80 MM HG: ICD-10-PCS | Mod: HCNC,CPTII,S$GLB, | Performed by: ORTHOPAEDIC SURGERY

## 2021-11-02 PROCEDURE — 20600 DRAIN/INJ JOINT/BURSA W/O US: CPT | Mod: 51,50,HCNC,S$GLB | Performed by: ORTHOPAEDIC SURGERY

## 2021-11-02 PROCEDURE — 1125F PR PAIN SEVERITY QUANTIFIED, PAIN PRESENT: ICD-10-PCS | Mod: HCNC,CPTII,S$GLB, | Performed by: ORTHOPAEDIC SURGERY

## 2021-11-02 PROCEDURE — 1159F MED LIST DOCD IN RCRD: CPT | Mod: HCNC,CPTII,S$GLB, | Performed by: ORTHOPAEDIC SURGERY

## 2021-11-02 RX ORDER — TRIAMCINOLONE ACETONIDE 40 MG/ML
40 INJECTION, SUSPENSION INTRA-ARTICULAR; INTRAMUSCULAR
Status: DISCONTINUED | OUTPATIENT
Start: 2021-11-02 | End: 2021-11-02 | Stop reason: HOSPADM

## 2021-11-02 RX ADMIN — TRIAMCINOLONE ACETONIDE 40 MG: 40 INJECTION, SUSPENSION INTRA-ARTICULAR; INTRAMUSCULAR at 03:11

## 2021-11-03 ENCOUNTER — TELEPHONE (OUTPATIENT)
Dept: PAIN MEDICINE | Facility: CLINIC | Age: 76
End: 2021-11-03
Payer: MEDICARE

## 2021-11-03 ENCOUNTER — IMMUNIZATION (OUTPATIENT)
Dept: PHARMACY | Facility: CLINIC | Age: 76
End: 2021-11-03
Payer: MEDICARE

## 2021-11-03 ENCOUNTER — INFUSION (OUTPATIENT)
Dept: INFUSION THERAPY | Facility: HOSPITAL | Age: 76
End: 2021-11-03
Attending: INTERNAL MEDICINE
Payer: MEDICARE

## 2021-11-03 ENCOUNTER — OFFICE VISIT (OUTPATIENT)
Dept: INTERNAL MEDICINE | Facility: CLINIC | Age: 76
End: 2021-11-03
Payer: MEDICARE

## 2021-11-03 ENCOUNTER — IMMUNIZATION (OUTPATIENT)
Dept: PHARMACY | Facility: CLINIC | Age: 76
End: 2021-11-03

## 2021-11-03 VITALS
BODY MASS INDEX: 26.01 KG/M2 | HEIGHT: 63 IN | RESPIRATION RATE: 16 BRPM | WEIGHT: 146.81 LBS | HEART RATE: 86 BPM | OXYGEN SATURATION: 99 % | DIASTOLIC BLOOD PRESSURE: 70 MMHG | TEMPERATURE: 97 F | SYSTOLIC BLOOD PRESSURE: 142 MMHG

## 2021-11-03 VITALS
DIASTOLIC BLOOD PRESSURE: 71 MMHG | HEART RATE: 93 BPM | TEMPERATURE: 98 F | OXYGEN SATURATION: 99 % | SYSTOLIC BLOOD PRESSURE: 144 MMHG | RESPIRATION RATE: 16 BRPM

## 2021-11-03 DIAGNOSIS — F33.1 MODERATE RECURRENT MAJOR DEPRESSION: ICD-10-CM

## 2021-11-03 DIAGNOSIS — I10 PRIMARY HYPERTENSION: ICD-10-CM

## 2021-11-03 DIAGNOSIS — J41.1 MUCOPURULENT CHRONIC BRONCHITIS: Chronic | ICD-10-CM

## 2021-11-03 DIAGNOSIS — K21.9 GASTROESOPHAGEAL REFLUX DISEASE, UNSPECIFIED WHETHER ESOPHAGITIS PRESENT: ICD-10-CM

## 2021-11-03 DIAGNOSIS — Z23 NEED FOR TETANUS, DIPHTHERIA, AND ACELLULAR PERTUSSIS (TDAP) VACCINE: ICD-10-CM

## 2021-11-03 DIAGNOSIS — N18.30 CONTROLLED TYPE 2 DIABETES MELLITUS WITH STAGE 3 CHRONIC KIDNEY DISEASE, WITHOUT LONG-TERM CURRENT USE OF INSULIN: ICD-10-CM

## 2021-11-03 DIAGNOSIS — E78.2 MIXED HYPERLIPIDEMIA: ICD-10-CM

## 2021-11-03 DIAGNOSIS — D50.8 OTHER IRON DEFICIENCY ANEMIA: ICD-10-CM

## 2021-11-03 DIAGNOSIS — Z86.79 HISTORY OF CEREBRAL ANEURYSM REPAIR: ICD-10-CM

## 2021-11-03 DIAGNOSIS — Z98.890 HISTORY OF CEREBRAL ANEURYSM REPAIR: ICD-10-CM

## 2021-11-03 DIAGNOSIS — Z86.73 HISTORY OF STROKE: ICD-10-CM

## 2021-11-03 DIAGNOSIS — M81.0 AGE-RELATED OSTEOPOROSIS WITHOUT CURRENT PATHOLOGICAL FRACTURE: Primary | ICD-10-CM

## 2021-11-03 DIAGNOSIS — I25.118 CORONARY ARTERY DISEASE OF NATIVE ARTERY OF NATIVE HEART WITH STABLE ANGINA PECTORIS: Primary | ICD-10-CM

## 2021-11-03 DIAGNOSIS — E11.22 CONTROLLED TYPE 2 DIABETES MELLITUS WITH STAGE 3 CHRONIC KIDNEY DISEASE, WITHOUT LONG-TERM CURRENT USE OF INSULIN: ICD-10-CM

## 2021-11-03 PROCEDURE — 1160F RVW MEDS BY RX/DR IN RCRD: CPT | Mod: HCNC,CPTII,S$GLB, | Performed by: PEDIATRICS

## 2021-11-03 PROCEDURE — 1125F AMNT PAIN NOTED PAIN PRSNT: CPT | Mod: HCNC,CPTII,S$GLB, | Performed by: PEDIATRICS

## 2021-11-03 PROCEDURE — 99214 PR OFFICE/OUTPT VISIT, EST, LEVL IV, 30-39 MIN: ICD-10-PCS | Mod: HCNC,S$GLB,, | Performed by: PEDIATRICS

## 2021-11-03 PROCEDURE — 3288F FALL RISK ASSESSMENT DOCD: CPT | Mod: HCNC,CPTII,S$GLB, | Performed by: PEDIATRICS

## 2021-11-03 PROCEDURE — 3077F SYST BP >= 140 MM HG: CPT | Mod: HCNC,CPTII,S$GLB, | Performed by: PEDIATRICS

## 2021-11-03 PROCEDURE — 3078F PR MOST RECENT DIASTOLIC BLOOD PRESSURE < 80 MM HG: ICD-10-PCS | Mod: HCNC,CPTII,S$GLB, | Performed by: PEDIATRICS

## 2021-11-03 PROCEDURE — 96372 THER/PROPH/DIAG INJ SC/IM: CPT | Mod: HCNC

## 2021-11-03 PROCEDURE — 99999 PR PBB SHADOW E&M-EST. PATIENT-LVL V: CPT | Mod: PBBFAC,HCNC,, | Performed by: PEDIATRICS

## 2021-11-03 PROCEDURE — 3288F PR FALLS RISK ASSESSMENT DOCUMENTED: ICD-10-PCS | Mod: HCNC,CPTII,S$GLB, | Performed by: PEDIATRICS

## 2021-11-03 PROCEDURE — 1159F MED LIST DOCD IN RCRD: CPT | Mod: HCNC,CPTII,S$GLB, | Performed by: PEDIATRICS

## 2021-11-03 PROCEDURE — 1125F PR PAIN SEVERITY QUANTIFIED, PAIN PRESENT: ICD-10-PCS | Mod: HCNC,CPTII,S$GLB, | Performed by: PEDIATRICS

## 2021-11-03 PROCEDURE — 1160F PR REVIEW ALL MEDS BY PRESCRIBER/CLIN PHARMACIST DOCUMENTED: ICD-10-PCS | Mod: HCNC,CPTII,S$GLB, | Performed by: PEDIATRICS

## 2021-11-03 PROCEDURE — 1101F PT FALLS ASSESS-DOCD LE1/YR: CPT | Mod: HCNC,CPTII,S$GLB, | Performed by: PEDIATRICS

## 2021-11-03 PROCEDURE — 1101F PR PT FALLS ASSESS DOC 0-1 FALLS W/OUT INJ PAST YR: ICD-10-PCS | Mod: HCNC,CPTII,S$GLB, | Performed by: PEDIATRICS

## 2021-11-03 PROCEDURE — 99214 OFFICE O/P EST MOD 30 MIN: CPT | Mod: HCNC,S$GLB,, | Performed by: PEDIATRICS

## 2021-11-03 PROCEDURE — 3077F PR MOST RECENT SYSTOLIC BLOOD PRESSURE >= 140 MM HG: ICD-10-PCS | Mod: HCNC,CPTII,S$GLB, | Performed by: PEDIATRICS

## 2021-11-03 PROCEDURE — 99999 PR PBB SHADOW E&M-EST. PATIENT-LVL V: ICD-10-PCS | Mod: PBBFAC,HCNC,, | Performed by: PEDIATRICS

## 2021-11-03 PROCEDURE — 3078F DIAST BP <80 MM HG: CPT | Mod: HCNC,CPTII,S$GLB, | Performed by: PEDIATRICS

## 2021-11-03 PROCEDURE — 1159F PR MEDICATION LIST DOCUMENTED IN MEDICAL RECORD: ICD-10-PCS | Mod: HCNC,CPTII,S$GLB, | Performed by: PEDIATRICS

## 2021-11-03 PROCEDURE — 63600175 PHARM REV CODE 636 W HCPCS: Mod: JG,HCNC | Performed by: INTERNAL MEDICINE

## 2021-11-03 RX ADMIN — DENOSUMAB 60 MG: 60 INJECTION SUBCUTANEOUS at 10:11

## 2021-11-04 ENCOUNTER — TELEPHONE (OUTPATIENT)
Dept: INTERNAL MEDICINE | Facility: CLINIC | Age: 76
End: 2021-11-04
Payer: MEDICARE

## 2021-11-05 ENCOUNTER — OFFICE VISIT (OUTPATIENT)
Dept: PAIN MEDICINE | Facility: CLINIC | Age: 76
End: 2021-11-05
Payer: MEDICARE

## 2021-11-05 VITALS
WEIGHT: 146.63 LBS | BODY MASS INDEX: 25.98 KG/M2 | RESPIRATION RATE: 17 BRPM | SYSTOLIC BLOOD PRESSURE: 149 MMHG | DIASTOLIC BLOOD PRESSURE: 79 MMHG | HEIGHT: 63 IN

## 2021-11-05 DIAGNOSIS — M79.18 RHOMBOID PAIN: ICD-10-CM

## 2021-11-05 DIAGNOSIS — M70.60 GREATER TROCHANTERIC BURSITIS, UNSPECIFIED LATERALITY: ICD-10-CM

## 2021-11-05 DIAGNOSIS — M53.3 SACROILIAC JOINT PAIN: Primary | ICD-10-CM

## 2021-11-05 DIAGNOSIS — M47.812 CERVICAL SPONDYLOSIS: ICD-10-CM

## 2021-11-05 DIAGNOSIS — M79.18 MYOFASCIAL MUSCLE PAIN: ICD-10-CM

## 2021-11-05 PROCEDURE — 1159F PR MEDICATION LIST DOCUMENTED IN MEDICAL RECORD: ICD-10-PCS | Mod: HCNC,CPTII,S$GLB, | Performed by: ANESTHESIOLOGY

## 2021-11-05 PROCEDURE — 3288F FALL RISK ASSESSMENT DOCD: CPT | Mod: HCNC,CPTII,S$GLB, | Performed by: ANESTHESIOLOGY

## 2021-11-05 PROCEDURE — 3077F SYST BP >= 140 MM HG: CPT | Mod: HCNC,CPTII,S$GLB, | Performed by: ANESTHESIOLOGY

## 2021-11-05 PROCEDURE — 3288F PR FALLS RISK ASSESSMENT DOCUMENTED: ICD-10-PCS | Mod: HCNC,CPTII,S$GLB, | Performed by: ANESTHESIOLOGY

## 2021-11-05 PROCEDURE — 3077F PR MOST RECENT SYSTOLIC BLOOD PRESSURE >= 140 MM HG: ICD-10-PCS | Mod: HCNC,CPTII,S$GLB, | Performed by: ANESTHESIOLOGY

## 2021-11-05 PROCEDURE — 1159F MED LIST DOCD IN RCRD: CPT | Mod: HCNC,CPTII,S$GLB, | Performed by: ANESTHESIOLOGY

## 2021-11-05 PROCEDURE — 1125F AMNT PAIN NOTED PAIN PRSNT: CPT | Mod: HCNC,CPTII,S$GLB, | Performed by: ANESTHESIOLOGY

## 2021-11-05 PROCEDURE — 96372 PR INJECTION,THERAP/PROPH/DIAG2ST, IM OR SUBCUT: ICD-10-PCS | Mod: HCNC,S$GLB,, | Performed by: ANESTHESIOLOGY

## 2021-11-05 PROCEDURE — 99214 PR OFFICE/OUTPT VISIT, EST, LEVL IV, 30-39 MIN: ICD-10-PCS | Mod: HCNC,25,S$GLB, | Performed by: ANESTHESIOLOGY

## 2021-11-05 PROCEDURE — 1125F PR PAIN SEVERITY QUANTIFIED, PAIN PRESENT: ICD-10-PCS | Mod: HCNC,CPTII,S$GLB, | Performed by: ANESTHESIOLOGY

## 2021-11-05 PROCEDURE — 1101F PR PT FALLS ASSESS DOC 0-1 FALLS W/OUT INJ PAST YR: ICD-10-PCS | Mod: HCNC,CPTII,S$GLB, | Performed by: ANESTHESIOLOGY

## 2021-11-05 PROCEDURE — 99999 PR PBB SHADOW E&M-EST. PATIENT-LVL V: ICD-10-PCS | Mod: PBBFAC,HCNC,, | Performed by: ANESTHESIOLOGY

## 2021-11-05 PROCEDURE — 1101F PT FALLS ASSESS-DOCD LE1/YR: CPT | Mod: HCNC,CPTII,S$GLB, | Performed by: ANESTHESIOLOGY

## 2021-11-05 PROCEDURE — 99999 PR PBB SHADOW E&M-EST. PATIENT-LVL V: CPT | Mod: PBBFAC,HCNC,, | Performed by: ANESTHESIOLOGY

## 2021-11-05 PROCEDURE — 3078F PR MOST RECENT DIASTOLIC BLOOD PRESSURE < 80 MM HG: ICD-10-PCS | Mod: HCNC,CPTII,S$GLB, | Performed by: ANESTHESIOLOGY

## 2021-11-05 PROCEDURE — 96372 THER/PROPH/DIAG INJ SC/IM: CPT | Mod: HCNC,S$GLB,, | Performed by: ANESTHESIOLOGY

## 2021-11-05 PROCEDURE — 99214 OFFICE O/P EST MOD 30 MIN: CPT | Mod: HCNC,25,S$GLB, | Performed by: ANESTHESIOLOGY

## 2021-11-05 PROCEDURE — 3078F DIAST BP <80 MM HG: CPT | Mod: HCNC,CPTII,S$GLB, | Performed by: ANESTHESIOLOGY

## 2021-11-05 RX ORDER — METHYLPREDNISOLONE ACETATE 80 MG/ML
80 INJECTION, SUSPENSION INTRA-ARTICULAR; INTRALESIONAL; INTRAMUSCULAR; SOFT TISSUE
Status: COMPLETED | OUTPATIENT
Start: 2021-11-05 | End: 2021-11-05

## 2021-11-05 RX ADMIN — METHYLPREDNISOLONE ACETATE 80 MG: 80 INJECTION, SUSPENSION INTRA-ARTICULAR; INTRALESIONAL; INTRAMUSCULAR; SOFT TISSUE at 03:11

## 2021-11-10 ENCOUNTER — TELEPHONE (OUTPATIENT)
Dept: PAIN MEDICINE | Facility: CLINIC | Age: 76
End: 2021-11-10
Payer: MEDICARE

## 2021-11-11 ENCOUNTER — TELEPHONE (OUTPATIENT)
Dept: INTERNAL MEDICINE | Facility: CLINIC | Age: 76
End: 2021-11-11
Payer: MEDICARE

## 2021-11-11 DIAGNOSIS — F33.1 MODERATE RECURRENT MAJOR DEPRESSION: Primary | ICD-10-CM

## 2021-11-12 RX ORDER — ESCITALOPRAM OXALATE 20 MG/1
20 TABLET ORAL DAILY
Qty: 90 TABLET | Refills: 1 | Status: SHIPPED | OUTPATIENT
Start: 2021-11-12 | End: 2022-02-21

## 2021-11-16 ENCOUNTER — HOSPITAL ENCOUNTER (OUTPATIENT)
Facility: HOSPITAL | Age: 76
Discharge: HOME | End: 2021-11-16
Attending: ANESTHESIOLOGY | Admitting: ANESTHESIOLOGY
Payer: MEDICARE

## 2021-11-16 VITALS
SYSTOLIC BLOOD PRESSURE: 169 MMHG | DIASTOLIC BLOOD PRESSURE: 69 MMHG | HEIGHT: 64 IN | OXYGEN SATURATION: 100 % | RESPIRATION RATE: 18 BRPM | WEIGHT: 138.75 LBS | TEMPERATURE: 99 F | HEART RATE: 79 BPM | BODY MASS INDEX: 23.69 KG/M2

## 2021-11-16 DIAGNOSIS — M53.3 SACROILIAC JOINT DYSFUNCTION: Primary | ICD-10-CM

## 2021-11-16 LAB — POCT GLUCOSE: 105 MG/DL (ref 70–110)

## 2021-11-16 PROCEDURE — 25500020 PHARM REV CODE 255: Mod: HCNC | Performed by: ANESTHESIOLOGY

## 2021-11-16 PROCEDURE — 20610 DRAIN/INJ JOINT/BURSA W/O US: CPT | Mod: HCNC | Performed by: ANESTHESIOLOGY

## 2021-11-16 PROCEDURE — 20610 PR DRAIN/INJECT LARGE JOINT/BURSA: ICD-10-PCS | Mod: 59,HCNC,RT, | Performed by: ANESTHESIOLOGY

## 2021-11-16 PROCEDURE — 20610 DRAIN/INJ JOINT/BURSA W/O US: CPT | Mod: 59,HCNC,RT, | Performed by: ANESTHESIOLOGY

## 2021-11-16 PROCEDURE — 63600175 PHARM REV CODE 636 W HCPCS: Mod: HCNC | Performed by: ANESTHESIOLOGY

## 2021-11-16 PROCEDURE — 27096 INJECT SACROILIAC JOINT: CPT | Mod: HCNC,RT,, | Performed by: ANESTHESIOLOGY

## 2021-11-16 PROCEDURE — 27096 INJECT SACROILIAC JOINT: CPT | Mod: HCNC | Performed by: ANESTHESIOLOGY

## 2021-11-16 PROCEDURE — 27096 PR INJECTION,SACROILIAC JOINT: ICD-10-PCS | Mod: HCNC,RT,, | Performed by: ANESTHESIOLOGY

## 2021-11-16 PROCEDURE — 25000003 PHARM REV CODE 250: Mod: HCNC | Performed by: ANESTHESIOLOGY

## 2021-11-16 PROCEDURE — 82962 GLUCOSE BLOOD TEST: CPT | Mod: HCNC | Performed by: ANESTHESIOLOGY

## 2021-11-16 PROCEDURE — A9585 GADOBUTROL INJECTION: HCPCS | Mod: HCNC | Performed by: ANESTHESIOLOGY

## 2021-11-16 RX ORDER — INDOMETHACIN 25 MG/1
CAPSULE ORAL
Status: DISCONTINUED | OUTPATIENT
Start: 2021-11-16 | End: 2021-11-16 | Stop reason: HOSPADM

## 2021-11-16 RX ORDER — MIDAZOLAM HYDROCHLORIDE 1 MG/ML
INJECTION, SOLUTION INTRAMUSCULAR; INTRAVENOUS
Status: DISCONTINUED | OUTPATIENT
Start: 2021-11-16 | End: 2021-11-16 | Stop reason: HOSPADM

## 2021-11-16 RX ORDER — FENTANYL CITRATE 50 UG/ML
INJECTION, SOLUTION INTRAMUSCULAR; INTRAVENOUS
Status: DISCONTINUED | OUTPATIENT
Start: 2021-11-16 | End: 2021-11-16 | Stop reason: HOSPADM

## 2021-11-16 RX ORDER — METHYLPREDNISOLONE ACETATE 40 MG/ML
INJECTION, SUSPENSION INTRA-ARTICULAR; INTRALESIONAL; INTRAMUSCULAR; SOFT TISSUE
Status: DISCONTINUED | OUTPATIENT
Start: 2021-11-16 | End: 2021-11-16 | Stop reason: HOSPADM

## 2021-11-16 RX ORDER — GADOBUTROL 604.72 MG/ML
INJECTION INTRAVENOUS
Status: DISCONTINUED | OUTPATIENT
Start: 2021-11-16 | End: 2021-11-16 | Stop reason: HOSPADM

## 2021-11-16 RX ORDER — LIDOCAINE HYDROCHLORIDE 20 MG/ML
INJECTION, SOLUTION EPIDURAL; INFILTRATION; INTRACAUDAL; PERINEURAL
Status: DISCONTINUED | OUTPATIENT
Start: 2021-11-16 | End: 2021-11-16 | Stop reason: HOSPADM

## 2021-12-06 ENCOUNTER — OFFICE VISIT (OUTPATIENT)
Dept: OPHTHALMOLOGY | Facility: CLINIC | Age: 76
End: 2021-12-06
Payer: MEDICARE

## 2021-12-06 DIAGNOSIS — H17.9 CORNEAL SCAR, LEFT EYE: ICD-10-CM

## 2021-12-06 DIAGNOSIS — E11.9 DIABETES MELLITUS TYPE 2 WITHOUT RETINOPATHY: ICD-10-CM

## 2021-12-06 DIAGNOSIS — B02.29 POST HERPETIC NEURALGIA: ICD-10-CM

## 2021-12-06 DIAGNOSIS — H52.7 REFRACTIVE ERROR: ICD-10-CM

## 2021-12-06 DIAGNOSIS — H40.1132 PRIMARY OPEN ANGLE GLAUCOMA (POAG) OF BOTH EYES, MODERATE STAGE: Primary | ICD-10-CM

## 2021-12-06 DIAGNOSIS — Z96.1 PSEUDOPHAKIA OF BOTH EYES: ICD-10-CM

## 2021-12-06 PROCEDURE — 92015 PR REFRACTION: ICD-10-PCS | Mod: HCNC,S$GLB,ICN, | Performed by: OPHTHALMOLOGY

## 2021-12-06 PROCEDURE — 92015 DETERMINE REFRACTIVE STATE: CPT | Mod: HCNC,S$GLB,ICN, | Performed by: OPHTHALMOLOGY

## 2021-12-06 PROCEDURE — 99999 PR PBB SHADOW E&M-EST. PATIENT-LVL III: ICD-10-PCS | Mod: PBBFAC,HCNC,, | Performed by: OPHTHALMOLOGY

## 2021-12-06 PROCEDURE — 99213 OFFICE O/P EST LOW 20 MIN: CPT | Mod: PBBFAC,HCNC | Performed by: OPHTHALMOLOGY

## 2021-12-06 PROCEDURE — 92014 COMPRE OPH EXAM EST PT 1/>: CPT | Mod: HCNC,S$GLB,ICN, | Performed by: OPHTHALMOLOGY

## 2021-12-06 PROCEDURE — 92083 HUMPHREY VISUAL FIELD - OU - BOTH EYES: ICD-10-PCS | Mod: HCNC,S$GLB,ICN, | Performed by: OPHTHALMOLOGY

## 2021-12-06 PROCEDURE — 92014 PR EYE EXAM, EST PATIENT,COMPREHESV: ICD-10-PCS | Mod: HCNC,S$GLB,ICN, | Performed by: OPHTHALMOLOGY

## 2021-12-06 PROCEDURE — 99999 PR PBB SHADOW E&M-EST. PATIENT-LVL III: CPT | Mod: PBBFAC,HCNC,, | Performed by: OPHTHALMOLOGY

## 2021-12-06 PROCEDURE — 92083 EXTENDED VISUAL FIELD XM: CPT | Mod: PBBFAC,HCNC | Performed by: OPHTHALMOLOGY

## 2021-12-06 RX ORDER — LATANOPROST 50 UG/ML
1 SOLUTION/ DROPS OPHTHALMIC NIGHTLY
Qty: 2.5 ML | Refills: 12 | Status: SHIPPED | OUTPATIENT
Start: 2021-12-06 | End: 2022-03-29 | Stop reason: SDUPTHER

## 2021-12-14 ENCOUNTER — OFFICE VISIT (OUTPATIENT)
Dept: UROLOGY | Facility: CLINIC | Age: 76
End: 2021-12-14
Payer: MEDICARE

## 2021-12-14 ENCOUNTER — TELEPHONE (OUTPATIENT)
Dept: NEUROLOGY | Facility: CLINIC | Age: 76
End: 2021-12-14
Payer: MEDICARE

## 2021-12-14 ENCOUNTER — OFFICE VISIT (OUTPATIENT)
Dept: NEUROLOGY | Facility: CLINIC | Age: 76
End: 2021-12-14
Payer: MEDICARE

## 2021-12-14 VITALS
HEART RATE: 75 BPM | HEIGHT: 64 IN | HEIGHT: 64 IN | DIASTOLIC BLOOD PRESSURE: 65 MMHG | TEMPERATURE: 98 F | WEIGHT: 138.69 LBS | SYSTOLIC BLOOD PRESSURE: 122 MMHG | RESPIRATION RATE: 16 BRPM | BODY MASS INDEX: 23.68 KG/M2 | BODY MASS INDEX: 23.8 KG/M2 | HEART RATE: 75 BPM | DIASTOLIC BLOOD PRESSURE: 65 MMHG | OXYGEN SATURATION: 99 % | SYSTOLIC BLOOD PRESSURE: 122 MMHG

## 2021-12-14 DIAGNOSIS — N39.3 STRESS INCONTINENCE OF URINE: Primary | ICD-10-CM

## 2021-12-14 DIAGNOSIS — R09.82 POSTNASAL DRIP: ICD-10-CM

## 2021-12-14 DIAGNOSIS — Z98.890 HX OF CRANIOTOMY: ICD-10-CM

## 2021-12-14 DIAGNOSIS — R42 DIZZINESS AND GIDDINESS: ICD-10-CM

## 2021-12-14 DIAGNOSIS — R05.9 COUGH: ICD-10-CM

## 2021-12-14 DIAGNOSIS — R11.0 NAUSEA: ICD-10-CM

## 2021-12-14 DIAGNOSIS — Z86.79 HISTORY OF CEREBRAL ANEURYSM REPAIR: ICD-10-CM

## 2021-12-14 DIAGNOSIS — R29.90 MULTIPLE NEUROLOGICAL SYMPTOMS: Primary | ICD-10-CM

## 2021-12-14 DIAGNOSIS — Z98.890 HISTORY OF CEREBRAL ANEURYSM REPAIR: ICD-10-CM

## 2021-12-14 DIAGNOSIS — N32.81 OAB (OVERACTIVE BLADDER): ICD-10-CM

## 2021-12-14 DIAGNOSIS — I25.118 CORONARY ARTERY DISEASE OF NATIVE ARTERY OF NATIVE HEART WITH STABLE ANGINA PECTORIS: ICD-10-CM

## 2021-12-14 DIAGNOSIS — R30.0 DYSURIA: ICD-10-CM

## 2021-12-14 DIAGNOSIS — G31.84 MILD COGNITIVE IMPAIRMENT: ICD-10-CM

## 2021-12-14 DIAGNOSIS — G40.109 TEMPORAL LOBE EPILEPSY: ICD-10-CM

## 2021-12-14 DIAGNOSIS — Z86.73 HISTORY OF STROKE: ICD-10-CM

## 2021-12-14 DIAGNOSIS — B02.29 POSTHERPETIC NEURALGIA: ICD-10-CM

## 2021-12-14 PROCEDURE — 87086 URINE CULTURE/COLONY COUNT: CPT | Mod: HCNC | Performed by: NURSE PRACTITIONER

## 2021-12-14 PROCEDURE — 99215 PR OFFICE/OUTPT VISIT, EST, LEVL V, 40-54 MIN: ICD-10-PCS | Mod: HCNC,S$GLB,, | Performed by: NURSE PRACTITIONER

## 2021-12-14 PROCEDURE — 99999 PR PBB SHADOW E&M-EST. PATIENT-LVL V: ICD-10-PCS | Mod: PBBFAC,HCNC,, | Performed by: NURSE PRACTITIONER

## 2021-12-14 PROCEDURE — 99215 OFFICE O/P EST HI 40 MIN: CPT | Mod: HCNC,S$GLB,, | Performed by: NURSE PRACTITIONER

## 2021-12-14 PROCEDURE — 99214 OFFICE O/P EST MOD 30 MIN: CPT | Mod: HCNC,S$GLB,, | Performed by: NURSE PRACTITIONER

## 2021-12-14 PROCEDURE — 99999 PR PBB SHADOW E&M-EST. PATIENT-LVL V: CPT | Mod: PBBFAC,HCNC,, | Performed by: NURSE PRACTITIONER

## 2021-12-14 PROCEDURE — 99214 PR OFFICE/OUTPT VISIT, EST, LEVL IV, 30-39 MIN: ICD-10-PCS | Mod: HCNC,S$GLB,, | Performed by: NURSE PRACTITIONER

## 2021-12-14 RX ORDER — ONDANSETRON 4 MG/1
4 TABLET, ORALLY DISINTEGRATING ORAL EVERY 8 HOURS PRN
Qty: 30 TABLET | Refills: 0 | Status: SHIPPED | OUTPATIENT
Start: 2021-12-14 | End: 2022-01-16 | Stop reason: ALTCHOICE

## 2021-12-16 ENCOUNTER — TELEPHONE (OUTPATIENT)
Dept: UROLOGY | Facility: CLINIC | Age: 76
End: 2021-12-16
Payer: MEDICARE

## 2021-12-16 LAB — BACTERIA UR CULT: NORMAL

## 2021-12-20 RX ORDER — DICLOFENAC SODIUM 25 MG/1
TABLET, DELAYED RELEASE ORAL
Qty: 60 TABLET | Refills: 11 | Status: SHIPPED | OUTPATIENT
Start: 2021-12-20 | End: 2023-06-09

## 2021-12-20 RX ORDER — RANOLAZINE 1000 MG/1
TABLET, EXTENDED RELEASE ORAL
Qty: 60 TABLET | Refills: 6 | Status: SHIPPED | OUTPATIENT
Start: 2021-12-20 | End: 2022-09-12 | Stop reason: SDUPTHER

## 2021-12-21 RX ORDER — TIZANIDINE 4 MG/1
TABLET ORAL
Qty: 60 TABLET | Refills: 0 | OUTPATIENT
Start: 2021-12-21

## 2022-01-05 PROCEDURE — 96372 THER/PROPH/DIAG INJ SC/IM: CPT

## 2022-01-05 PROCEDURE — 99284 EMERGENCY DEPT VISIT MOD MDM: CPT | Mod: 25

## 2022-01-05 PROCEDURE — 96374 THER/PROPH/DIAG INJ IV PUSH: CPT

## 2022-01-06 ENCOUNTER — OFFICE VISIT (OUTPATIENT)
Dept: PODIATRY | Facility: CLINIC | Age: 77
End: 2022-01-06
Payer: MEDICARE

## 2022-01-06 ENCOUNTER — HOSPITAL ENCOUNTER (EMERGENCY)
Facility: HOSPITAL | Age: 77
Discharge: HOME | End: 2022-01-06
Attending: EMERGENCY MEDICINE
Payer: MEDICARE

## 2022-01-06 VITALS
HEART RATE: 84 BPM | RESPIRATION RATE: 17 BRPM | HEIGHT: 64 IN | OXYGEN SATURATION: 98 % | SYSTOLIC BLOOD PRESSURE: 128 MMHG | DIASTOLIC BLOOD PRESSURE: 59 MMHG | TEMPERATURE: 99 F | BODY MASS INDEX: 23.8 KG/M2

## 2022-01-06 DIAGNOSIS — M79.672 FOOT PAIN, LEFT: ICD-10-CM

## 2022-01-06 DIAGNOSIS — E11.22 CONTROLLED TYPE 2 DIABETES MELLITUS WITH STAGE 3 CHRONIC KIDNEY DISEASE, WITHOUT LONG-TERM CURRENT USE OF INSULIN: ICD-10-CM

## 2022-01-06 DIAGNOSIS — N18.30 CONTROLLED TYPE 2 DIABETES MELLITUS WITH STAGE 3 CHRONIC KIDNEY DISEASE, WITHOUT LONG-TERM CURRENT USE OF INSULIN: ICD-10-CM

## 2022-01-06 DIAGNOSIS — M79.672 ACUTE FOOT PAIN, LEFT: Primary | ICD-10-CM

## 2022-01-06 DIAGNOSIS — M80.00XA AGE-RELATED OSTEOPOROSIS WITH CURRENT PATHOLOGICAL FRACTURE, INITIAL ENCOUNTER: ICD-10-CM

## 2022-01-06 LAB
ALBUMIN SERPL BCP-MCNC: 3.8 G/DL (ref 3.5–5.2)
ALP SERPL-CCNC: 62 U/L (ref 55–135)
ALT SERPL W/O P-5'-P-CCNC: 27 U/L (ref 10–44)
ANION GAP SERPL CALC-SCNC: 10 MMOL/L (ref 8–16)
AST SERPL-CCNC: 23 U/L (ref 10–40)
BASOPHILS # BLD AUTO: 0.03 K/UL (ref 0–0.2)
BASOPHILS NFR BLD: 0.3 % (ref 0–1.9)
BILIRUB SERPL-MCNC: 0.6 MG/DL (ref 0.1–1)
BUN SERPL-MCNC: 13 MG/DL (ref 8–23)
CALCIUM SERPL-MCNC: 9.3 MG/DL (ref 8.7–10.5)
CHLORIDE SERPL-SCNC: 106 MMOL/L (ref 95–110)
CO2 SERPL-SCNC: 22 MMOL/L (ref 23–29)
CREAT SERPL-MCNC: 0.8 MG/DL (ref 0.5–1.4)
CRP SERPL-MCNC: 14.2 MG/L (ref 0–8.2)
DIFFERENTIAL METHOD: ABNORMAL
EOSINOPHIL # BLD AUTO: 0 K/UL (ref 0–0.5)
EOSINOPHIL NFR BLD: 0.4 % (ref 0–8)
ERYTHROCYTE [DISTWIDTH] IN BLOOD BY AUTOMATED COUNT: 12.6 % (ref 11.5–14.5)
EST. GFR  (AFRICAN AMERICAN): >60 ML/MIN/1.73 M^2
EST. GFR  (NON AFRICAN AMERICAN): >60 ML/MIN/1.73 M^2
GLUCOSE SERPL-MCNC: 146 MG/DL (ref 70–110)
HCT VFR BLD AUTO: 37 % (ref 37–48.5)
HGB BLD-MCNC: 12.1 G/DL (ref 12–16)
IMM GRANULOCYTES # BLD AUTO: 0.05 K/UL (ref 0–0.04)
IMM GRANULOCYTES NFR BLD AUTO: 0.5 % (ref 0–0.5)
LYMPHOCYTES # BLD AUTO: 1.4 K/UL (ref 1–4.8)
LYMPHOCYTES NFR BLD: 13.3 % (ref 18–48)
MCH RBC QN AUTO: 31 PG (ref 27–31)
MCHC RBC AUTO-ENTMCNC: 32.7 G/DL (ref 32–36)
MCV RBC AUTO: 95 FL (ref 82–98)
MONOCYTES # BLD AUTO: 1 K/UL (ref 0.3–1)
MONOCYTES NFR BLD: 9.2 % (ref 4–15)
NEUTROPHILS # BLD AUTO: 8 K/UL (ref 1.8–7.7)
NEUTROPHILS NFR BLD: 76.3 % (ref 38–73)
NRBC BLD-RTO: 0 /100 WBC
PLATELET # BLD AUTO: 237 K/UL (ref 150–450)
PMV BLD AUTO: 8.7 FL (ref 9.2–12.9)
POTASSIUM SERPL-SCNC: 4.5 MMOL/L (ref 3.5–5.1)
PROT SERPL-MCNC: 7.7 G/DL (ref 6–8.4)
RBC # BLD AUTO: 3.9 M/UL (ref 4–5.4)
SODIUM SERPL-SCNC: 138 MMOL/L (ref 136–145)
URATE SERPL-MCNC: 3 MG/DL (ref 2.4–5.7)
WBC # BLD AUTO: 10.45 K/UL (ref 3.9–12.7)

## 2022-01-06 PROCEDURE — 99999 PR PBB SHADOW E&M-EST. PATIENT-LVL IV: ICD-10-PCS | Mod: PBBFAC,,, | Performed by: PODIATRIST

## 2022-01-06 PROCEDURE — 3072F PR LOW RISK FOR RETINOPATHY: ICD-10-PCS | Mod: CPTII,S$GLB,, | Performed by: PODIATRIST

## 2022-01-06 PROCEDURE — 1159F MED LIST DOCD IN RCRD: CPT | Mod: CPTII,S$GLB,, | Performed by: PODIATRIST

## 2022-01-06 PROCEDURE — 63600175 PHARM REV CODE 636 W HCPCS: Performed by: NURSE PRACTITIONER

## 2022-01-06 PROCEDURE — 99203 PR OFFICE/OUTPT VISIT, NEW, LEVL III, 30-44 MIN: ICD-10-PCS | Mod: S$GLB,,, | Performed by: PODIATRIST

## 2022-01-06 PROCEDURE — 85025 COMPLETE CBC W/AUTO DIFF WBC: CPT | Performed by: EMERGENCY MEDICINE

## 2022-01-06 PROCEDURE — 1159F PR MEDICATION LIST DOCUMENTED IN MEDICAL RECORD: ICD-10-PCS | Mod: CPTII,S$GLB,, | Performed by: PODIATRIST

## 2022-01-06 PROCEDURE — 1100F PR PT FALLS ASSESS DOC 2+ FALLS/FALL W/INJURY/YR: ICD-10-PCS | Mod: CPTII,S$GLB,, | Performed by: PODIATRIST

## 2022-01-06 PROCEDURE — 99999 PR PBB SHADOW E&M-EST. PATIENT-LVL IV: CPT | Mod: PBBFAC,,, | Performed by: PODIATRIST

## 2022-01-06 PROCEDURE — 1160F PR REVIEW ALL MEDS BY PRESCRIBER/CLIN PHARMACIST DOCUMENTED: ICD-10-PCS | Mod: CPTII,S$GLB,, | Performed by: PODIATRIST

## 2022-01-06 PROCEDURE — 1100F PTFALLS ASSESS-DOCD GE2>/YR: CPT | Mod: CPTII,S$GLB,, | Performed by: PODIATRIST

## 2022-01-06 PROCEDURE — 63600175 PHARM REV CODE 636 W HCPCS: Performed by: EMERGENCY MEDICINE

## 2022-01-06 PROCEDURE — 80053 COMPREHEN METABOLIC PANEL: CPT | Performed by: NURSE PRACTITIONER

## 2022-01-06 PROCEDURE — 99214 OFFICE O/P EST MOD 30 MIN: CPT | Mod: PBBFAC | Performed by: PODIATRIST

## 2022-01-06 PROCEDURE — 84550 ASSAY OF BLOOD/URIC ACID: CPT | Performed by: NURSE PRACTITIONER

## 2022-01-06 PROCEDURE — 25000003 PHARM REV CODE 250: Performed by: NURSE PRACTITIONER

## 2022-01-06 PROCEDURE — 3288F PR FALLS RISK ASSESSMENT DOCUMENTED: ICD-10-PCS | Mod: CPTII,S$GLB,, | Performed by: PODIATRIST

## 2022-01-06 PROCEDURE — 86140 C-REACTIVE PROTEIN: CPT | Performed by: NURSE PRACTITIONER

## 2022-01-06 PROCEDURE — 3072F LOW RISK FOR RETINOPATHY: CPT | Mod: CPTII,S$GLB,, | Performed by: PODIATRIST

## 2022-01-06 PROCEDURE — 1160F RVW MEDS BY RX/DR IN RCRD: CPT | Mod: CPTII,S$GLB,, | Performed by: PODIATRIST

## 2022-01-06 PROCEDURE — 99203 OFFICE O/P NEW LOW 30 MIN: CPT | Mod: S$GLB,,, | Performed by: PODIATRIST

## 2022-01-06 PROCEDURE — 3288F FALL RISK ASSESSMENT DOCD: CPT | Mod: CPTII,S$GLB,, | Performed by: PODIATRIST

## 2022-01-06 RX ORDER — KETOROLAC TROMETHAMINE 30 MG/ML
15 INJECTION, SOLUTION INTRAMUSCULAR; INTRAVENOUS
Status: COMPLETED | OUTPATIENT
Start: 2022-01-06 | End: 2022-01-06

## 2022-01-06 RX ORDER — METHYLPREDNISOLONE ACETATE 40 MG/ML
40 INJECTION, SUSPENSION INTRA-ARTICULAR; INTRALESIONAL; INTRAMUSCULAR; SOFT TISSUE
Status: COMPLETED | OUTPATIENT
Start: 2022-01-06 | End: 2022-01-06

## 2022-01-06 RX ORDER — HYDROCODONE BITARTRATE AND ACETAMINOPHEN 7.5; 325 MG/1; MG/1
1 TABLET ORAL EVERY 6 HOURS PRN
Qty: 12 TABLET | Refills: 0 | Status: SHIPPED | OUTPATIENT
Start: 2022-01-06 | End: 2022-01-16 | Stop reason: ALTCHOICE

## 2022-01-06 RX ORDER — HYDROCODONE BITARTRATE AND ACETAMINOPHEN 10; 325 MG/1; MG/1
1 TABLET ORAL
Status: COMPLETED | OUTPATIENT
Start: 2022-01-06 | End: 2022-01-06

## 2022-01-06 RX ORDER — CLINDAMYCIN HYDROCHLORIDE 300 MG/1
300 CAPSULE ORAL EVERY 8 HOURS
Qty: 15 CAPSULE | Refills: 0 | Status: SHIPPED | OUTPATIENT
Start: 2022-01-06 | End: 2022-01-11

## 2022-01-06 RX ORDER — PREDNISONE 20 MG/1
40 TABLET ORAL DAILY
Qty: 10 TABLET | Refills: 0 | Status: SHIPPED | OUTPATIENT
Start: 2022-01-06 | End: 2022-01-11

## 2022-01-06 RX ADMIN — HYDROCODONE BITARTRATE AND ACETAMINOPHEN 1 TABLET: 10; 325 TABLET ORAL at 02:01

## 2022-01-06 RX ADMIN — METHYLPREDNISOLONE ACETATE 40 MG: 40 INJECTION, SUSPENSION INTRA-ARTICULAR; INTRALESIONAL; INTRAMUSCULAR; SOFT TISSUE at 05:01

## 2022-01-06 RX ADMIN — KETOROLAC TROMETHAMINE 15 MG: 30 INJECTION, SOLUTION INTRAMUSCULAR at 03:01

## 2022-01-06 NOTE — ED PROVIDER NOTES
SCRIBE #1 NOTE: I, Eda Ayoub, am scribing for, and in the presence of,  Jaison Fernandez MD. I have scribed the following portions of the note - Other sections scribed: lab results and ED discussion.         HISTORY     Chief Complaint   Patient presents with    Ankle Pain     C/o L ankle pain that started this morning. States has HX of arthritis and thinks it could be a flare up. Denies any trauma/fall/injuries. States she went to bed last night and woke up with the pain.     Review of patient's allergies indicates:   Allergen Reactions    Doxycycline Nausea Only    Penicillins Anaphylaxis, Hives, Shortness Of Breath and Swelling    Oxycodone Other (See Comments)     Fatigue      Penicillin g benzathine Other (See Comments)    Adhesive Rash    Betadine [povidone-iodine] Rash     Pt confirms has completed CT w/ IV contrast-iodine without reaction or need for pre-medication.    Sulfa (sulfonamide antibiotics) Itching    Zanaflex [tizanidine] Anxiety        HPI   The history is provided by the patient.   Foot Injury   The incident occurred at home. The injury mechanism is unknown. The incident occurred today. The pain is present in the left foot. The quality of the pain is described as aching, burning and throbbing. The pain has been worsening since onset. Associated symptoms include inability to bear weight. Pertinent negatives include no numbness, no loss of motion, no muscle weakness, no loss of sensation and no tingling. She reports no foreign bodies present. The symptoms are aggravated by activity, bearing weight and palpation. She has tried nothing for the symptoms.    Patient diagnosed with covid last week. Patient has had gout in the past.     PCP: POOL Chaudhari Jr, MD     Past Medical History:  Past Medical History:   Diagnosis Date    Abnormal ECG 8/5/2019    Aneurysm     Anticoagulant long-term use     Arthritis     CAD in native artery 8/5/2019    COPD (chronic obstructive pulmonary  disease)     Diabetes mellitus     Fall 10/10/2019    Formatting of this note might be different from the original. Found sitting on floor next to bed last night Mild confusion today Does not recall falling or how she ended up on floor UA today Labs yesterday unremarkable    Glaucoma     Hypertension     Seizures     Shingles 05/27/2017    Stroke         Past Surgical History:  Past Surgical History:   Procedure Laterality Date    BRAIN SURGERY      CARDIAC CATHETERIZATION      CORONARY ANGIOPLASTY      EPIDURAL STEROID INJECTION INTO LUMBAR SPINE Bilateral 11/12/2019    Procedure: TF XANDER L4/5;  Surgeon: Desean Dias MD;  Location: [facility];  Service: Pain Management;  Laterality: Bilateral;    HYSTERECTOMY      INJECTION OF ANESTHETIC AGENT AROUND MEDIAL BRANCH NERVES INNERVATING LUMBAR FACET JOINT Bilateral 1/31/2020    Procedure: Bilateral L3-5 MBB;  Surgeon: Desean Dias MD;  Location: [facility];  Service: Pain Management;  Laterality: Bilateral;    INJECTION OF ANESTHETIC AGENT INTO SACROILIAC JOINT Right 11/16/2021    Procedure: Right BLOCK, SACROILIAC JOINT Right GTB with RN IV sedation;  Surgeon: Yao Fulton MD;  Location: [facility];  Service: Pain Management;  Laterality: Right;    INJECTION OF JOINT Bilateral 7/9/2020    Procedure: Bilateral shoulder GH Joint injection with local;  Surgeon: Desean Dias MD;  [facility];  Service: Pain Management;  Laterality: Bilateral;    TRANSFORAMINAL EPIDURAL INJECTION OF STEROID Bilateral 7/23/2019    Procedure: Bilateral L3/4 Transforaminal Epidural Steroid Injection;  Surgeon: Desean Dias MD;  Location: [facility];  Service: Pain Management;  Laterality: Bilateral;    TRANSFORAMINAL EPIDURAL INJECTION OF STEROID Bilateral 3/10/2020    Procedure: Right T12/L1 TF XANDER with local;  Surgeon: Desean Dias MD;  Location: [facility];  Service: Pain Management;  Laterality: Bilateral;    TRANSFORAMINAL  EPIDURAL INJECTION OF STEROID Right 2020    Procedure: Right T12/L1 TFESI Covid day of procedure;  Surgeon: Desean Dias MD;  Location: [facility];  Service: Pain Management;  Laterality: Right;        Family History:  Family History   Problem Relation Age of Onset    No Known Problems Mother     No Known Problems Father         Social History:  Social History     Tobacco Use    Smoking status: Former Smoker     Packs/day: 1.00     Years: 10.00     Pack years: 10.00     Types: Cigarettes     Quit date: 2004     Years since quittin.7    Smokeless tobacco: Never Used   Substance and Sexual Activity    Alcohol use: No    Drug use: Never    Sexual activity: Not Currently         ROS   Review of Systems   Constitutional: Negative for fever.   HENT: Negative for sore throat.    Respiratory: Negative for shortness of breath.    Cardiovascular: Negative for chest pain.   Gastrointestinal: Negative for nausea.   Genitourinary: Negative for dysuria.   Musculoskeletal: Negative for back pain.        Left foot pain    Skin: Negative for rash.   Neurological: Negative for tingling, weakness and numbness.   Hematological: Does not bruise/bleed easily.       PHYSICAL EXAM     Initial Vitals [22 2335]   BP Pulse Resp Temp SpO2   (!) 152/90 93 15 98.2 °F (36.8 °C) 98 %      MAP       --           Physical Exam    Constitutional: She appears well-developed and well-nourished. No distress.   HENT:   Head: Normocephalic and atraumatic.   Eyes: Conjunctivae are normal. Pupils are equal, round, and reactive to light.   Neck: Neck supple.   Normal range of motion.  Cardiovascular: Normal rate, regular rhythm and normal heart sounds.   Pulmonary/Chest: Breath sounds normal.   Abdominal: Abdomen is soft. Bowel sounds are normal. She exhibits no distension. There is no abdominal tenderness. There is no rebound.   Musculoskeletal:         General: No edema. Normal range of motion.      Cervical back: Normal  "range of motion and neck supple.        Feet:      Neurological: She is alert and oriented to person, place, and time. She has normal strength.   Skin: Skin is warm and dry.   Psychiatric: She has a normal mood and affect.          ED COURSE   Procedures  ED ONGOING VITALS:  Vitals:    01/05/22 2335 01/06/22 0245 01/06/22 0247 01/06/22 0531   BP: (!) 152/90 139/63  (!) 128/59   Pulse: 93 95  84   Resp: 15 17 17 17   Temp: 98.2 °F (36.8 °C) 99.3 °F (37.4 °C)  99.2 °F (37.3 °C)   TempSrc: Oral Oral  Oral   SpO2: 98% 100%  98%   Height: 5' 4" (1.626 m)            ABNORMAL LAB VALUES:  Labs Reviewed   COMPREHENSIVE METABOLIC PANEL - Abnormal; Notable for the following components:       Result Value    CO2 22 (*)     Glucose 146 (*)     All other components within normal limits   C-REACTIVE PROTEIN - Abnormal; Notable for the following components:    CRP 14.2 (*)     All other components within normal limits   CBC W/ AUTO DIFFERENTIAL - Abnormal; Notable for the following components:    RBC 3.90 (*)     MPV 8.7 (*)     Gran # (ANC) 8.0 (*)     Immature Grans (Abs) 0.05 (*)     Gran % 76.3 (*)     Lymph % 13.3 (*)     All other components within normal limits   URIC ACID         ALL LAB VALUES:  Results for orders placed or performed during the hospital encounter of 01/06/22   Comprehensive metabolic panel   Result Value Ref Range    Sodium 138 136 - 145 mmol/L    Potassium 4.5 3.5 - 5.1 mmol/L    Chloride 106 95 - 110 mmol/L    CO2 22 (L) 23 - 29 mmol/L    Glucose 146 (H) 70 - 110 mg/dL    BUN 13 8 - 23 mg/dL    Creatinine 0.8 0.5 - 1.4 mg/dL    Calcium 9.3 8.7 - 10.5 mg/dL    Total Protein 7.7 6.0 - 8.4 g/dL    Albumin 3.8 3.5 - 5.2 g/dL    Total Bilirubin 0.6 0.1 - 1.0 mg/dL    Alkaline Phosphatase 62 55 - 135 U/L    AST 23 10 - 40 U/L    ALT 27 10 - 44 U/L    Anion Gap 10 8 - 16 mmol/L    eGFR if African American >60 >60 mL/min/1.73 m^2    eGFR if non African American >60 >60 mL/min/1.73 m^2   Uric acid   Result Value " Ref Range    Uric Acid 3.0 2.4 - 5.7 mg/dL   C-reactive protein   Result Value Ref Range    CRP 14.2 (H) 0.0 - 8.2 mg/L   CBC auto differential   Result Value Ref Range    WBC 10.45 3.90 - 12.70 K/uL    RBC 3.90 (L) 4.00 - 5.40 M/uL    Hemoglobin 12.1 12.0 - 16.0 g/dL    Hematocrit 37.0 37.0 - 48.5 %    MCV 95 82 - 98 fL    MCH 31.0 27.0 - 31.0 pg    MCHC 32.7 32.0 - 36.0 g/dL    RDW 12.6 11.5 - 14.5 %    Platelets 237 150 - 450 K/uL    MPV 8.7 (L) 9.2 - 12.9 fL    Immature Granulocytes 0.5 0.0 - 0.5 %    Gran # (ANC) 8.0 (H) 1.8 - 7.7 K/uL    Immature Grans (Abs) 0.05 (H) 0.00 - 0.04 K/uL    Lymph # 1.4 1.0 - 4.8 K/uL    Mono # 1.0 0.3 - 1.0 K/uL    Eos # 0.0 0.0 - 0.5 K/uL    Baso # 0.03 0.00 - 0.20 K/uL    nRBC 0 0 /100 WBC    Gran % 76.3 (H) 38.0 - 73.0 %    Lymph % 13.3 (L) 18.0 - 48.0 %    Mono % 9.2 4.0 - 15.0 %    Eosinophil % 0.4 0.0 - 8.0 %    Basophil % 0.3 0.0 - 1.9 %    Differential Method Automated      *Note: Due to a large number of results and/or encounters for the requested time period, some results have not been displayed. A complete set of results can be found in Results Review.           RADIOLOGY STUDIES:  Imaging Results          X-Ray Foot Complete Left (Final result)  Result time 01/06/22 07:48:03    Final result by Gael Moreno MD (01/06/22 07:48:03)                 Impression:      No acute abnormality identified.    Talocalcaneal fixation screw again noted.  Suggestion of heterotopic ossification at the Lisfranc joint raising question of mild chronic injury.      Electronically signed by: Gael Moreno  Date:    01/06/2022  Time:    07:48             Narrative:    EXAMINATION:  XR FOOT COMPLETE 3 VIEW LEFT    CLINICAL HISTORY:  .  Pain in left foot    TECHNIQUE:  AP, lateral and oblique views of the left foot were performed.    COMPARISON:  05/08/2020    FINDINGS:  Talocalcaneal fixation screw again noted.  Suggestion of heterotopic ossification at the Lisfranc joint raising question of  mild chronic injury.  No evidence of fracture or dislocation. No radiopaque foreign body.  Mild osteoarthritic change at the first MTP joint.                              4:51 AM: Per ED physician, pt's XR Foot results: soft tissue swelling.                The above vital signs and test results have been reviewed by the emergency provider.     ED Discussion     3:00 AM: Александр Barriga NP hands over pt care to Dr. Fernandez pending lab and imaging results.     4:52 AM: Reassessed pt at this time. Discussed with pt all pertinent ED information and results. Discussed pt dx and plan of tx. Gave pt all f/u and return to the ED instructions. All questions and concerns were addressed at this time. Pt expresses understanding of information and instructions, and is comfortable with plan to discharge. Pt is stable for discharge.    I discussed with patient and/or family/caretaker that evaluation in the ED does not suggest any emergent or life threatening medical conditions requiring immediate intervention beyond what was provided in the ED, and I believe patient is safe for discharge.  Regardless, an unremarkable evaluation in the ED does not preclude the development or presence of a serious of life threatening condition. As such, patient was instructed to return immediately for any worsening or change in current symptoms.      ED Medications:  Current Discharge Medication List        Discharge Medications:  Discharge Medication List as of 1/6/2022  4:52 AM      START taking these medications    Details   HYDROcodone-acetaminophen (NORCO) 7.5-325 mg per tablet Take 1 tablet by mouth every 6 (six) hours as needed for Pain., Starting Thu 1/6/2022, Print      predniSONE (DELTASONE) 20 MG tablet Take 2 tablets (40 mg total) by mouth once daily. for 5 days, Starting Thu 1/6/2022, Until Tue 1/11/2022, Print             Follow-up Information     [facility] - Podiatry In 1 day.    Specialty: Podiatry  Contact information:  [address]  [facility] [address] [phone]           [facility]    Specialty: Emergency Medicine  Why: As needed, If symptoms worsen  Contact information:  [address] [facility] [address] [phone]                      I discussed with patient and/or family/caretaker that evaluation in the ED does not suggest any emergent or life threatening medical conditions requiring immediate intervention beyond what was provided in the ED, and I believe patient is safe for discharge. Regardless, an unremarkable evaluation in the ED does not preclude the development or presence of a serious or life threatening condition. As such, patient was instructed to return immediately for any worsening or change in current symptoms.    Pre-hypertension/Hypertension: The pt has been informed that they may have pre-hypertension or hypertension based on a blood pressure reading in the ED. I recommend that the pt call the PCP listed on their discharge instructions or a physician of their choice this week to arrange f/u for further evaluation of possible pre-hypertension or hypertension.       MEDICAL DECISION MAKING   Medical Decision Making:   Clinical Tests:   Lab Tests: Ordered and Reviewed  Radiological Study: Ordered and Reviewed            Scribe Attestation:   Scribe #1: I performed the above scribed service and the documentation accurately describes the services I performed. I attest to the accuracy of the note.    Attending Attestation:           Physician Attestation for Scribe:    Physician Attestation Statement for Scribe #2: I, Jaison Fernandez MD, reviewed documentation, as scribed by Eda Ayoub in my presence, and it is both accurate and complete. I also acknowledge and confirm the content of the note done by Siva #1.          CLINICAL IMPRESSION       ICD-10-CM ICD-9-CM   1. Foot pain, left  M79.672 729.5       Disposition:   Disposition:  Discharged  Condition: Stable         Jaison Fernandez MD  01/07/22 0042

## 2022-01-07 ENCOUNTER — TELEPHONE (OUTPATIENT)
Dept: INTERNAL MEDICINE | Facility: CLINIC | Age: 77
End: 2022-01-07
Payer: MEDICARE

## 2022-01-07 NOTE — TELEPHONE ENCOUNTER
----- Message from Papi Mitchell sent at 1/6/2022 12:38 PM CST -----  Contact: Rm/Brother  Patients brother is calling to speak with the nurse regarding patient Covid symptoms. Reports patient is in a lot of pain with coughing is requesting a call back. Please give Mr Abdalla a high priority call back at [phone] as requested.  Thanks,  RP

## 2022-01-07 NOTE — PROGRESS NOTES
Subjective:       Patient ID: Shireen Felix is a 76 y.o. female.    Chief Complaint: Foot Pain (Patient present in clinic with 10/10 pain to left foot. She states she woke up on 01.05.22 and foot was red and swollen. She denies injury to foot. )      HPI:  Shireen Felix presents to the office today with severe 10/10 and foot pain.  Patient does have a history diabetes mellitus.  States that she woke up for 05/11/2022 with redness, increased swelling, and severe pain to the left foot.  She states stepping out of bed which was severely discomforting.  States that she was unable to ambulate.  Did present to the emergency room on 01/05/2020 to at which point she was thought to have gout.  Uric acid was normal.  She was told that she had a foot infection and was discharged with steroid and pain medications.  States she did not feel these medications as she did have appointment today.  Patient's PMD is POOL Chaudhari Jr, MD.     Of note, patient relates having multiple stress fractures in the past with osteoporosis.  States recent stress fractures to the ribs.      Review of patient's allergies indicates:   Allergen Reactions    Doxycycline Nausea Only    Penicillins Anaphylaxis, Hives, Shortness Of Breath and Swelling    Oxycodone Other (See Comments)     Fatigue      Penicillin g benzathine Other (See Comments)    Adhesive Rash    Betadine [povidone-iodine] Rash     Pt confirms has completed CT w/ IV contrast-iodine without reaction or need for pre-medication.    Sulfa (sulfonamide antibiotics) Itching    Zanaflex [tizanidine] Anxiety       Past Medical History:   Diagnosis Date    Abnormal ECG 8/5/2019    Aneurysm     Anticoagulant long-term use     Arthritis     CAD in native artery 8/5/2019    COPD (chronic obstructive pulmonary disease)     Diabetes mellitus     Fall 10/10/2019    Formatting of this note might be different from the original. Found sitting on floor next to bed last night  Mild confusion today Does not recall falling or how she ended up on floor UA today Labs yesterday unremarkable    Glaucoma     Hypertension     Seizures     Shingles 2017    Stroke        Family History   Problem Relation Age of Onset    No Known Problems Mother     No Known Problems Father        Social History     Socioeconomic History    Marital status: Single   Tobacco Use    Smoking status: Former Smoker     Packs/day: 1.00     Years: 10.00     Pack years: 10.00     Types: Cigarettes     Quit date: 2004     Years since quittin.7    Smokeless tobacco: Never Used   Substance and Sexual Activity    Alcohol use: No    Drug use: Never    Sexual activity: Not Currently   Social History Narrative    No pets or smokers in household.       Past Surgical History:   Procedure Laterality Date    BRAIN SURGERY      CARDIAC CATHETERIZATION      CORONARY ANGIOPLASTY      EPIDURAL STEROID INJECTION INTO LUMBAR SPINE Bilateral 2019    Procedure: TF XANDER L4/5;  Surgeon: Desean Dias MD;  Location: [facility];  Service: Pain Management;  Laterality: Bilateral;    HYSTERECTOMY      INJECTION OF ANESTHETIC AGENT AROUND MEDIAL BRANCH NERVES INNERVATING LUMBAR FACET JOINT Bilateral 2020    Procedure: Bilateral L3-5 MBB;  Surgeon: Desean Dias MD;  Location: [facility];  Service: Pain Management;  Laterality: Bilateral;    INJECTION OF ANESTHETIC AGENT INTO SACROILIAC JOINT Right 2021    Procedure: Right BLOCK, SACROILIAC JOINT Right GTB with RN IV sedation;  Surgeon: Yao Fulton MD;  Location: [facility];  Service: Pain Management;  Laterality: Right;    INJECTION OF JOINT Bilateral 2020    Procedure: Bilateral shoulder GH Joint injection with local;  Surgeon: Desean Dias MD;  Location: [facility];  Service: Pain Management;  Laterality: Bilateral;    TRANSFORAMINAL EPIDURAL INJECTION OF STEROID Bilateral 2019    Procedure: Bilateral L3/4  Transforaminal Epidural Steroid Injection;  Surgeon: Desean Dias MD;  Location: [facility];  Service: Pain Management;  Laterality: Bilateral;    TRANSFORAMINAL EPIDURAL INJECTION OF STEROID Bilateral 3/10/2020    Procedure: Right T12/L1 TF XANDER with local;  Surgeon: Desean Dias MD;  Location: [facility];  Service: Pain Management;  Laterality: Bilateral;    TRANSFORAMINAL EPIDURAL INJECTION OF STEROID Right 6/19/2020    Procedure: Right T12/L1 TFESI Covid day of procedure;  Surgeon: Desean Dias MD;  Location: [facility];  Service: Pain Management;  Laterality: Right;       Review of Systems       Objective:   LMP  (LMP Unknown)     X-Ray Foot Complete Left  Narrative: EXAMINATION:  XR FOOT COMPLETE 3 VIEW LEFT    CLINICAL HISTORY:  .  Pain in left foot    TECHNIQUE:  AP, lateral and oblique views of the left foot were performed.    COMPARISON:  05/08/2020    FINDINGS:  Talocalcaneal fixation screw again noted.  Suggestion of heterotopic ossification at the Lisfranc joint raising question of mild chronic injury.  No evidence of fracture or dislocation. No radiopaque foreign body.  Mild osteoarthritic change at the first MTP joint.  Impression: No acute abnormality identified.    Talocalcaneal fixation screw again noted.  Suggestion of heterotopic ossification at the Lisfranc joint raising question of mild chronic injury.    Electronically signed by: Gael Moreno  Date:    01/06/2022  Time:    07:48       Physical Exam  LOWER EXTREMITY PHYSICAL EXAMINATION    VASCULAR: The right DP pulse is 2/4 and the left DP is 2/4. The right PT pulse is 2/4 and the left PT pulse is 2/4. Proximal to distal, warm to warm. No dependent rubor or elevation palor is noted. Capillary refill time is less than 3 seconds. Hair growth is appreciated to the dorsal foot and digits.    NEUROLOGY:  Sharp/dull, light touch, proprioception all intact and equal bilaterally.  Vibratory sensation is intact.  Protective  sensation intact    DERMATOLOGY: Skin is supple, dry and intact. No ecchymosis is noted. No hypertrophic skin formation. No erythema or cellulitis is noted.     ORTHOPEDIC:  Severe pain on palpation of the left foot.  There is slight increase in erythema to the medial aspect of 1st metatarsal cuneiform joint and progressing across the midfoot.  Patient is most severe pain appears to be on the proximal aspect of the 3rd metatarsal shaft proximally.  Pain appears to progress S along the navicular cuneiform joint on the left foot.    Assessment:     1. Acute foot pain, left    2. Age-related osteoporosis with current pathological fracture, initial encounter    3. Controlled type 2 diabetes mellitus with stage 3 chronic kidney disease, without long-term current use of insulin        Plan:     Acute foot pain, left    Age-related osteoporosis with current pathological fracture, initial encounter    Controlled type 2 diabetes mellitus with stage 3 chronic kidney disease, without long-term current use of insulin    Other orders  -     clindamycin (CLEOCIN) 300 MG capsule; Take 1 capsule (300 mg total) by mouth every 8 (eight) hours. for 5 days  Dispense: 15 capsule; Refill: 0        Thorough discussion is had with the patient this afternoon, concerning the diagnosis, its etiology, and the treatment algorithm at present.    Labs were reviewed showing a slight increase in the white blood cell count which is slightly elevated compared to her regular baseline labs.  Her CRP is slightly elevated.  Based on this, I would recommend that she start oral antibiotic prophylactically.  I am concerned, based on the x-rays, there are multiple stress fractures to the 3rd and 4th metatarsal shafts proximally to the 3rd metatarsal stress fracture appears to be slightly more prominent than the 4th.    Patient will placed in a Cam boot and compressive dressing on the left foot to decrease swelling and pain.  Recommend patient does fill her  pain medication.  Patient remained partial weight-bearing as tolerated to the left lower extremity in Cam boot for approximately 2 weeks.  She will follow up in clinic with repeat x-rays        Future Appointments   Date Time Provider Department Center   1/12/2022  8:10 AM [facility] CT1 LIMIT 500 LBS [facility] CT SCAN [facility]   1/12/2022  9:30 AM PK Bergeron [facility] AUDIO [facility]   1/12/2022 10:15 AM Mary Santana MD [facility] ENT [facility]   1/12/2022 11:20 AM Jarrell Dye MD [facility] CARDIO [facility]   1/19/2022  2:00 PM Kaia Parrish DPM [facility] POD [facility]   1/20/2022  8:30 AM Elie Hobbs MD [facility] OPHTHAL [facility]   2/3/2022  9:00 AM Yao Fulton MD [facility] INT ALEXANDRA [facility]   2/10/2022 10:00 AM Domenica Lott PAAlekC [facility] IN PN [facility]   4/27/2022 10:00 AM LABORATORY, [facility] LAB [facility]   5/4/2022 10:40 AM CARLEE Chaudhari Jr., MD [facility] IM [facility]   5/6/2022 12:00 PM LABORATORY, [facility] LAB [facility]   5/6/2022 12:30 PM Julien Hernandez MD [facility] RHEUM [facility]   5/6/2022  1:00 PM INJECTION 1, [facility] INFUSION [facility] INFSN [facility]   6/14/2022 11:00 AM Donna Ramirez NP [facility] UROLOGY [facility]   6/14/2022  2:00 PM Priya Benavidez NP [facility] NEURO [facility]

## 2022-01-07 NOTE — TELEPHONE ENCOUNTER
Called and spoke with pt brother Rm who stated pt saw someone in Podiatry to evaluated sister foot. Rm did not mention chest pain or coughing issues for pt.    Rm proceed to stated he need a refill for vitamin D and asked to have a Covid orders for testing put in due to him not feeling well.  BN

## 2022-01-10 RX ORDER — TRAMADOL HYDROCHLORIDE 50 MG/1
50 TABLET ORAL EVERY 6 HOURS PRN
Qty: 20 TABLET | Refills: 0 | Status: SHIPPED | OUTPATIENT
Start: 2022-01-10 | End: 2022-01-16 | Stop reason: ALTCHOICE

## 2022-01-16 ENCOUNTER — HOSPITAL ENCOUNTER (EMERGENCY)
Facility: HOSPITAL | Age: 77
Discharge: HOME | End: 2022-01-16
Attending: FAMILY MEDICINE
Payer: MEDICARE

## 2022-01-16 VITALS
TEMPERATURE: 99 F | OXYGEN SATURATION: 99 % | HEIGHT: 64 IN | SYSTOLIC BLOOD PRESSURE: 159 MMHG | HEART RATE: 89 BPM | DIASTOLIC BLOOD PRESSURE: 69 MMHG | RESPIRATION RATE: 18 BRPM | BODY MASS INDEX: 23.8 KG/M2

## 2022-01-16 DIAGNOSIS — M54.2 NECK PAIN: Primary | ICD-10-CM

## 2022-01-16 PROCEDURE — 99284 EMERGENCY DEPT VISIT MOD MDM: CPT | Mod: 25,HCNC

## 2022-01-16 PROCEDURE — 96372 THER/PROPH/DIAG INJ SC/IM: CPT | Mod: HCNC

## 2022-01-16 PROCEDURE — 63600175 PHARM REV CODE 636 W HCPCS: Performed by: FAMILY MEDICINE

## 2022-01-16 RX ORDER — GABAPENTIN 100 MG/1
100 CAPSULE ORAL 2 TIMES DAILY PRN
COMMUNITY
End: 2023-05-22

## 2022-01-16 RX ORDER — DEXAMETHASONE SODIUM PHOSPHATE 4 MG/ML
8 INJECTION, SOLUTION INTRA-ARTICULAR; INTRALESIONAL; INTRAMUSCULAR; INTRAVENOUS; SOFT TISSUE
Status: COMPLETED | OUTPATIENT
Start: 2022-01-16 | End: 2022-01-16

## 2022-01-16 RX ORDER — KETOROLAC TROMETHAMINE 30 MG/ML
30 INJECTION, SOLUTION INTRAMUSCULAR; INTRAVENOUS
Status: COMPLETED | OUTPATIENT
Start: 2022-01-16 | End: 2022-01-16

## 2022-01-16 RX ORDER — METHYLPREDNISOLONE 4 MG/1
TABLET ORAL
Qty: 1 EACH | Refills: 0 | Status: SHIPPED | OUTPATIENT
Start: 2022-01-16 | End: 2022-01-16 | Stop reason: SDUPTHER

## 2022-01-16 RX ORDER — METHYLPREDNISOLONE 4 MG/1
TABLET ORAL
Qty: 1 EACH | Refills: 0 | Status: SHIPPED | OUTPATIENT
Start: 2022-01-16 | End: 2022-02-06

## 2022-01-16 RX ORDER — HYDROCODONE BITARTRATE AND ACETAMINOPHEN 10; 325 MG/1; MG/1
1 TABLET ORAL EVERY 4 HOURS PRN
Qty: 18 TABLET | Refills: 0 | Status: SHIPPED | OUTPATIENT
Start: 2022-01-16 | End: 2022-01-16 | Stop reason: ALTCHOICE

## 2022-01-16 RX ADMIN — DEXAMETHASONE SODIUM PHOSPHATE 8 MG: 4 INJECTION INTRA-ARTICULAR; INTRALESIONAL; INTRAMUSCULAR; INTRAVENOUS; SOFT TISSUE at 07:01

## 2022-01-16 RX ADMIN — KETOROLAC TROMETHAMINE 30 MG: 30 INJECTION, SOLUTION INTRAMUSCULAR at 06:01

## 2022-01-16 NOTE — ED PROVIDER NOTES
SCRIBE #1 NOTE: I, Theodore Vanegas, am scribing for, and in the presence of, Melva Brito MD. I have scribed the entire note.       History     Chief Complaint   Patient presents with    Neck Pain     Pt. States she started having neck pain yesterday that has gotten worse into the night and today. Pt. Is inconsistent with story, unsure if she feel 2 days ago or 7 days ago. +blood thinners.      Review of patient's allergies indicates:   Allergen Reactions    Penicillins Anaphylaxis, Hives, Shortness Of Breath and Swelling    Adhesive Rash    Betadine [povidone-iodine] Rash     Pt confirms has completed CT w/ IV contrast-iodine without reaction or need for pre-medication.    Doxycycline Nausea Only    Oxycodone Other (See Comments)     Fatigue      Sulfa (sulfonamide antibiotics) Itching    Zanaflex [tizanidine] Anxiety         History of Present Illness     HPI    1/16/2022, 5:51 PM  History obtained from the patient      History of Present Illness: Shireen Felix is a 76 y.o. female patient with a PMHx of aneurysm, CAD, COPD, DM, shingles, and seizure who presents to the Emergency Department for evaluation of neck pain which onset gradually last night. The pt does report having fell on Thursday. Symptoms are constant and moderate in severity. No mitigating or exacerbating factors reported. Patient denies any fever, chills, dizziness, confusion, numbness, and all other sxs at this time. The pt is on Plavix. No further complaints or concerns at this time.       Arrival mode: Ambulance Service     PCP: POOL Chaudhari Jr, MD        Past Medical History:  Past Medical History:   Diagnosis Date    Abnormal ECG 8/5/2019    Aneurysm     Anticoagulant long-term use     Arthritis     CAD in native artery 8/5/2019    COPD (chronic obstructive pulmonary disease)     Diabetes mellitus     Fall 10/10/2019    Formatting of this note might be different from the original. Found sitting on floor next to  bed last night Mild confusion today Does not recall falling or how she ended up on floor UA today Labs yesterday unremarkable    Glaucoma     Hypertension     Seizures     Shingles 05/27/2017    Stroke        Past Surgical History:  Past Surgical History:   Procedure Laterality Date    BRAIN SURGERY      CARDIAC CATHETERIZATION      CORONARY ANGIOPLASTY      EPIDURAL STEROID INJECTION INTO LUMBAR SPINE Bilateral 11/12/2019    Procedure: TF XANDER L4/5;  Surgeon: Desean Dias MD;  Location: [facility] PAIN [facility];  Service: Pain Management;  Laterality: Bilateral;    HYSTERECTOMY      INJECTION OF ANESTHETIC AGENT AROUND MEDIAL BRANCH NERVES INNERVATING LUMBAR FACET JOINT Bilateral 1/31/2020    Procedure: Bilateral L3-5 MBB;  Surgeon: Desean Dias MD;  Location: [facility]IN [facility];  Service: Pain Management;  Laterality: Bilateral;    INJECTION OF ANESTHETIC AGENT INTO SACROILIAC JOINT Right 11/16/2021    Procedure: Right BLOCK, SACROILIAC JOINT Right GTB with RN IV sedation;  Surgeon: Yao Fulton MD;  Location: [facility];  Service: Pain Management;  Laterality: Right;    INJECTION OF JOINT Bilateral 7/9/2020    Procedure: Bilateral shoulder GH Joint injection with local;  Surgeon: Desean Dias MD;  Location: [facility];  Service: Pain Management;  Laterality: Bilateral;    TRANSFORAMINAL EPIDURAL INJECTION OF STEROID Bilateral 7/23/2019    Procedure: Bilateral L3/4 Transforaminal Epidural Steroid Injection;  Surgeon: Desean Dias MD;  Location: [facility];  Service: Pain Management;  Laterality: Bilateral;    TRANSFORAMINAL EPIDURAL INJECTION OF STEROID Bilateral 3/10/2020    Procedure: Right T12/L1 TF XANDER with local;  Surgeon: Desean Dias MD;  Location: [facility];  Service: Pain Management;  Laterality: Bilateral;    TRANSFORAMINAL EPIDURAL INJECTION OF STEROID Right 6/19/2020    Procedure: Right T12/L1 TFESI Covid day of procedure;  Surgeon: Desean Dias MD;  Location: [facility]  PAIN MGT;  Service: Pain Management;  Laterality: Right;         Family History:  Family History   Problem Relation Age of Onset    No Known Problems Mother     No Known Problems Father        Social History:  Social History     Tobacco Use    Smoking status: Former Smoker     Packs/day: 1.00     Years: 10.00     Pack years: 10.00     Types: Cigarettes     Quit date: 2004     Years since quittin.7    Smokeless tobacco: Never Used   Substance and Sexual Activity    Alcohol use: No    Drug use: Never    Sexual activity: Not Currently        Review of Systems     Review of Systems   Constitutional: Negative for chills and fever.   HENT: Negative for sore throat.    Respiratory: Negative for shortness of breath.    Cardiovascular: Negative for chest pain.   Gastrointestinal: Negative for nausea.   Genitourinary: Negative for dysuria.   Musculoskeletal: Positive for neck pain. Negative for back pain.   Skin: Negative for rash.   Neurological: Negative for dizziness, weakness and numbness.   Hematological: Does not bruise/bleed easily.   Psychiatric/Behavioral: Negative for confusion.   All other systems reviewed and are negative.     Physical Exam     Initial Vitals [22 1455]   BP Pulse Resp Temp SpO2   (!) 150/70 97 18 98.8 °F (37.1 °C) 97 %      MAP       --          Physical Exam  Nursing Notes and Vital Signs Reviewed.  Constitutional: Patient is in moderate distress due to pain. Well-developed and well-nourished. The pt is tearful.   Head: Atraumatic. Normocephalic.  Eyes: EOM intact. Conjunctivae are not pale. No scleral icterus.  ENT: Mucous membranes are moist. Oropharynx is clear and symmetric.    Neck: Supple. Limited ROM due to pain. There is paraspinal muscle tenderness to the cervical spine.   Cardiovascular: Regular rate. Regular rhythm. No murmurs, rubs, or gallops. Distal pulses are 2+ and symmetric.  Pulmonary/Chest: No respiratory distress. Clear to auscultation bilaterally. No  "wheezing or rales.  Abdominal: Soft and non-distended.  There is no tenderness.  No rebound, guarding, or rigidity.   Musculoskeletal: Moves all extremities. No obvious deformities. No edema.  Skin: Warm and dry.  Neurological:  Alert, awake, and appropriate.  Normal speech.  No acute focal neurological deficits are appreciated.  Psychiatric: Normal affect. Good eye contact. Appropriate in content.     ED Course   Procedures  ED Vital Signs:  Vitals:    01/16/22 1455 01/16/22 1548   BP: (!) 150/70 (!) 159/69   Pulse: 97 89   Resp: 18 18   Temp: 98.8 °F (37.1 °C)    TempSrc: Oral    SpO2: 97% 99%   Height: 5' 4" (1.626 m)        Abnormal Lab Results:  Labs Reviewed - No data to display     All Lab Results:  Results for orders placed or performed during the hospital encounter of 01/06/22   Comprehensive metabolic panel   Result Value Ref Range    Sodium 138 136 - 145 mmol/L    Potassium 4.5 3.5 - 5.1 mmol/L    Chloride 106 95 - 110 mmol/L    CO2 22 (L) 23 - 29 mmol/L    Glucose 146 (H) 70 - 110 mg/dL    BUN 13 8 - 23 mg/dL    Creatinine 0.8 0.5 - 1.4 mg/dL    Calcium 9.3 8.7 - 10.5 mg/dL    Total Protein 7.7 6.0 - 8.4 g/dL    Albumin 3.8 3.5 - 5.2 g/dL    Total Bilirubin 0.6 0.1 - 1.0 mg/dL    Alkaline Phosphatase 62 55 - 135 U/L    AST 23 10 - 40 U/L    ALT 27 10 - 44 U/L    Anion Gap 10 8 - 16 mmol/L    eGFR if African American >60 >60 mL/min/1.73 m^2    eGFR if non African American >60 >60 mL/min/1.73 m^2   Uric acid   Result Value Ref Range    Uric Acid 3.0 2.4 - 5.7 mg/dL   C-reactive protein   Result Value Ref Range    CRP 14.2 (H) 0.0 - 8.2 mg/L   CBC auto differential   Result Value Ref Range    WBC 10.45 3.90 - 12.70 K/uL    RBC 3.90 (L) 4.00 - 5.40 M/uL    Hemoglobin 12.1 12.0 - 16.0 g/dL    Hematocrit 37.0 37.0 - 48.5 %    MCV 95 82 - 98 fL    MCH 31.0 27.0 - 31.0 pg    MCHC 32.7 32.0 - 36.0 g/dL    RDW 12.6 11.5 - 14.5 %    Platelets 237 150 - 450 K/uL    MPV 8.7 (L) 9.2 - 12.9 fL    Immature Granulocytes " 0.5 0.0 - 0.5 %    Gran # (ANC) 8.0 (H) 1.8 - 7.7 K/uL    Immature Grans (Abs) 0.05 (H) 0.00 - 0.04 K/uL    Lymph # 1.4 1.0 - 4.8 K/uL    Mono # 1.0 0.3 - 1.0 K/uL    Eos # 0.0 0.0 - 0.5 K/uL    Baso # 0.03 0.00 - 0.20 K/uL    nRBC 0 0 /100 WBC    Gran % 76.3 (H) 38.0 - 73.0 %    Lymph % 13.3 (L) 18.0 - 48.0 %    Mono % 9.2 4.0 - 15.0 %    Eosinophil % 0.4 0.0 - 8.0 %    Basophil % 0.3 0.0 - 1.9 %    Differential Method Automated      *Note: Due to a large number of results and/or encounters for the requested time period, some results have not been displayed. A complete set of results can be found in Results Review.         Imaging Results:  Imaging Results          X-Ray Cervical Spine Complete 5 view (Final result)  Result time 01/16/22 17:40:43    Final result by Shane Jones MD (01/16/22 17:40:43)                 Impression:      No acute abnormality identified.    Degenerative changes.      Electronically signed by: Shane Jones  Date:    01/16/2022  Time:    17:40             Narrative:    EXAMINATION:  XR CERVICAL SPINE COMPLETE 5 VIEW    CLINICAL HISTORY:  . Cervicalgia    TECHNIQUE:  AP, Lateral, bilateral oblique and open mouth views of the cervical spine were performed.    COMPARISON:  None    FINDINGS:  No fracture.  No traumatic malalignment.  No prevertebral soft tissue swelling noting shoulders obscure the mid and lower cervical spine on the lateral radiograph.    Degenerative change in the mid and lower cervical spine with facet and uncovertebral joint arthropathy.  Anterior osteophytosis.  Dens is intact on open-mouth view.                                        The Emergency Provider reviewed the vital signs and test results, which are outlined above.     ED Discussion       6:27 PM: Reassessed pt at this time. Discussed with pt all pertinent ED information and results. Discussed pt dx and plan of tx. Gave pt all f/u and return to the ED instructions. All questions and concerns were addressed  at this time. Pt expresses understanding of information and instructions, and is comfortable with plan to discharge. Pt is stable for discharge.    I discussed with patient and/or family/caretaker that evaluation in the ED does not suggest any emergent or life threatening medical conditions requiring immediate intervention beyond what was provided in the ED, and I believe patient is safe for discharge.  Regardless, an unremarkable evaluation in the ED does not preclude the development or presence of a serious of life threatening condition. As such, patient was instructed to return immediately for any worsening or change in current symptoms.       Medical Decision Making:   Clinical Tests:   Lab Tests: Reviewed and Ordered  Radiological Study: Reviewed and Ordered           ED Medication(s):  Medications   ketorolac injection 30 mg (30 mg Intramuscular Given 1/16/22 1815)   dexamethasone injection 8 mg (8 mg Intramuscular Given 1/16/22 1919)       Discharge Medication List as of 1/16/2022  7:04 PM      START taking these medications    Details   methylPREDNISolone (MEDROL DOSEPACK) 4 mg tablet USE UD, Normal              Follow-up Information     Schedule an appointment as soon as possible for a visit  with POOL Chaudhari Jr, MD.    Specialties: Internal Medicine, Pediatrics  Why: As needed  Contact information:  [id number]  [place] [address]  [phone]                             Scribe Attestation:   Scribe #1: I performed the above scribed service and the documentation accurately describes the services I performed. I attest to the accuracy of the note.     Attending:   Physician Attestation Statement for Scribe #1: I, Melva Brito MD, personally performed the services described in this documentation, as scribed by Theodore Vanegas, in my presence, and it is both accurate and complete.           Clinical Impression       ICD-10-CM ICD-9-CM   1. Neck pain  M54.2 723.1       Disposition:    Disposition: Discharged  Condition: Stable         Melva Brito MD  01/19/22 6565

## 2022-01-17 NOTE — ED NOTES
Pt presented to ED for neck pain, and ringing in the ears, pt states that she fell on Thursday and hurt her right wrist/hand and left leg. Pt states that she is experiencing intense neck pain. Pt is on blood thinners, pt denies hitting head and is AAOx4. Pt has a boot on her left lower extremity and a wrist brace on he left upper extremity. Family at the bedside, pt resting comfortably, NAD at this time wctm for further plan of care and assessment.

## 2022-01-17 NOTE — ED NOTES
Went in to discharge pt, while explaining what pain medication had been prescribed pt and family did not agree with medication prescribed and would like an alternate pain management option. Physician notified at this time.

## 2022-01-17 NOTE — PHARMACY MED REC
"  Admission Medication History     The home medication history was taken by Kenyon Mast.    You may go to "Admission" then "Reconcile Home Medications" tabs to review and/or act upon these items.      The home medication list has been updated by the Pharmacy department.    Please read ALL comments highlighted in yellow.    Please address this information as you see fit.     Feel free to contact us if you have any questions or require assistance.      The medications listed below were removed from the home medication list. Please reorder if appropriate:  Patient reports no longer taking the following medication(s):   GEMFIBROZIL 600 MG TABLET   HYDROCODONE-ACETAMINOPHEN 7.5-325 MG TABLET (Therapy completed.)   ONDANSETRON ODT 4 MG TABLET (Therapy completed.)   TRAMADOL 50 MG TABLET (Therapy completed.)    Medications listed below were obtained from: Patient/family and Analytic software- [employer]  (Not in a hospital admission)      Potential issues to be addressed PRIOR TO DISCHARGE: NONE      Darrick Mast [facility]  Spectralink [phone]      Current Outpatient Medications on File Prior to Encounter   Medication Sig Dispense Refill Last Dose    amLODIPine (NORVASC) 10 MG tablet Take 1 tablet (10 mg total) by mouth once daily. 90 tablet 3 1/16/2022 at Unknown time    atorvastatin (LIPITOR) 40 MG tablet Take 1 tablet (40 mg total) by mouth once daily. 90 tablet 3 1/16/2022 at Unknown time    BREO ELLIPTA 200-25 mcg/dose DsDv diskus inhaler INHALE 1 PUFF INTO THE LUNGS EVERY EVENING 60 each 11 1/15/2022 at Unknown time    cholecalciferol, vitamin D3, 1,250 mcg (50,000 unit) capsule Take 1 capsule (50,000 Units total) by mouth every 7 days. 12 capsule 0 Past Week at Unknown time    clopidogreL (PLAVIX) 75 mg tablet TAKE 1 TABLET BY MOUTH ONCE DAILY 90 tablet 3 1/16/2022 at Unknown time    DEXILANT 60 mg capsule Take 1 capsule by mouth once daily.   1/16/2022 at Unknown time    diclofenac (VOLTAREN) 25 MG " TbEC TAKE 1 TABLET BY MOUTH TWICE DAILY 60 tablet 11 1/16/2022 at Unknown time    diltiaZEM (CARDIZEM LA) 120 mg 24 hr tablet Take 1 tablet (120 mg total) by mouth once daily. 30 tablet 3 1/15/2022 at Unknown time    EScitalopram oxalate (LEXAPRO) 20 MG tablet Take 1 tablet (20 mg total) by mouth once daily. 90 tablet 1 1/15/2022 at Unknown time    eslicarbazepine (APTIOM) 800 mg Tab Take 1,200 mg by mouth every evening. (Patient taking differently: Take 400 mg by mouth every morning. and take 800 mg every night at bedtime.) 90 tablet 3 1/16/2022 at Unknown time    fluticasone propionate (FLONASE) 50 mcg/actuation nasal spray SPRAY 1 SPRAY IN EACH NOSTRIL EVERY DAY 16 g 11 1/16/2022 at Unknown time    hydroCHLOROthiazide (HYDRODIURIL) 25 MG tablet TAKE 1 TABLET BY MOUTH DAILY AS NEEDED 30 tablet 3 1/16/2022 at Unknown time    latanoprost 0.005 % ophthalmic solution Place 1 drop into both eyes every evening. 2.5 mL 12 1/15/2022 at Unknown time    metFORMIN (GLUCOPHAGE) 500 MG tablet TAKE 1 TABLET BY MOUTH ONCE DAILY 90 tablet 3 1/15/2022 at Unknown time    metoprolol tartrate (LOPRESSOR) 50 MG tablet Take 1 tablet (50 mg total) by mouth 2 (two) times daily. (Patient taking differently: Take 25 mg by mouth 2 (two) times daily.) 60 tablet 3 1/16/2022 at Unknown time    mirabegron (MYRBETRIQ) 50 mg Tb24 Take 1 tablet (50 mg total) by mouth once daily. 30 tablet 11 1/16/2022 at Unknown time    montelukast (SINGULAIR) 10 mg tablet TAKE 1 TABLET BY MOUTH ONCE DAILY 90 tablet 3 1/16/2022 at Unknown time    omeprazole (PRILOSEC) 40 MG capsule Take 1 capsule (40 mg total) by mouth every morning. 90 capsule 3 1/16/2022 at Unknown time    ranolazine (RANEXA) 1,000 mg Tb12 TAKE 1 TABLET BY MOUTH TWICE DAILY 60 tablet 6 1/16/2022 at Unknown time    roflumilast (DALIRESP) 500 mcg Tab Take 1 tablet (500 mcg total) by mouth once daily. 90 tablet 1 1/16/2022 at Unknown time    SPIRIVA WITH HANDIHALER 18 mcg inhalation  capsule INHALE THE CONTENTS OF 1 CAPSULE VIA HANDIHALER. INHALE AND HOLD BREATH THEN EXHALE AND REPEAT (2 INHALATIONS TOTAL). USE ONCE DAILY. 30 capsule 3 1/16/2022 at Unknown time    tiZANidine (ZANAFLEX) 4 MG tablet TAKE 1 TABLET BY MOUTH TWICE DAILY AS NEEDED 60 tablet 0 1/16/2022 at Unknown time    valsartan (DIOVAN) 320 MG tablet Take 1 tablet (320 mg total) by mouth once daily. 30 tablet 3 1/16/2022 at Unknown time    acetaminophen (TYLENOL) 500 MG tablet Take 500 mg by mouth as needed for Pain.       albuterol (PROVENTIL/VENTOLIN HFA) 90 mcg/actuation inhaler INHALE 2 PUFFS INTO THE LUNGS EVERY 4 HOURS AS NEEDED 18 g 11     denosumab (PROLIA) 60 mg/mL Syrg every 6 (six) months.   More than a month at Unknown time    gabapentin (NEURONTIN) 100 MG capsule Take 100 mg by mouth 2 (two) times daily as needed.       nitroGLYCERIN (NITROSTAT) 0.4 MG SL tablet DISSOLVE 1 TABLET UNDER TONGUE EVERY 5 MINUTES FOR CHEST PAIN (MAY TAKE UP TO 3 DOSES THEN SEEK MEDICAL ATTENTION) 25 tablet 0     [DISCONTINUED] albuterol-ipratropium (DUO-NEB) 2.5 mg-0.5 mg/3 mL nebulizer solution Take 3 mLs by nebulization every 6 (six) hours as needed for Wheezing. Rescue 1 Box 11     [DISCONTINUED] gemfibrozil (LOPID) 600 MG tablet Take 1 tablet (600 mg total) by mouth once daily. 30 tablet 0     [DISCONTINUED] guaifenesin 100 mg/5 ml (ROBITUSSIN) 100 mg/5 mL syrup Take 200 mg by mouth as needed for Cough.       [DISCONTINUED] HYDROcodone-acetaminophen (NORCO) 7.5-325 mg per tablet Take 1 tablet by mouth every 6 (six) hours as needed for Pain. 12 tablet 0     [DISCONTINUED] Lactobacillus acidophilus (PROBIOTIC ACIDOPHILUS ORAL) Take 1 capsule by mouth 2 (two) times daily.       [DISCONTINUED] loratadine (CLARITIN) 10 mg tablet Take 1 tablet by mouth once daily.       [DISCONTINUED] MELATONIN ORAL Take by mouth nightly as needed.       [DISCONTINUED] mupirocin (BACTROBAN) 2 % ointment Apply topically 2 (two) times daily. 15 g  1     [DISCONTINUED] neomycin-polymyxin-hydrocortisone (CORTISPORIN) 3.5-10,000-1 mg/mL-unit/mL-% otic suspension Place 4 drops into the right ear 4 (four) times daily. 10 mL 0     [DISCONTINUED] ondansetron (ZOFRAN-ODT) 4 MG TbDL Take 1 tablet (4 mg total) by mouth every 8 (eight) hours as needed (nausea). 30 tablet 0     [DISCONTINUED] sennosides (SENNA) 8.6 mg Cap Take by mouth as needed.        [DISCONTINUED] traMADoL (ULTRAM) 50 mg tablet Take 1 tablet (50 mg total) by mouth every 6 (six) hours as needed for Pain. 20 tablet 0                            .

## 2022-01-25 ENCOUNTER — TELEPHONE (OUTPATIENT)
Dept: PAIN MEDICINE | Facility: CLINIC | Age: 77
End: 2022-01-25
Payer: MEDICARE

## 2022-01-25 NOTE — TELEPHONE ENCOUNTER
----- Message from Kevin Lyons sent at 1/25/2022  8:56 AM CST -----  Contact: Rm/ Brother  Rm would like a call back at [phone], in regards to consulting with nurse about the patient.

## 2022-01-27 ENCOUNTER — TELEPHONE (OUTPATIENT)
Dept: ORTHOPEDICS | Facility: CLINIC | Age: 77
End: 2022-01-27
Payer: MEDICARE

## 2022-01-27 DIAGNOSIS — M25.531 BILATERAL WRIST PAIN: Primary | ICD-10-CM

## 2022-01-27 DIAGNOSIS — M25.532 BILATERAL WRIST PAIN: Primary | ICD-10-CM

## 2022-01-27 NOTE — TELEPHONE ENCOUNTER
Spoke to the patient who was seen in the ED for several falls I tried to tell the patient that she needed to be seen by trauma clinic an she refused to be seen by them an stated that she only wanted to be seen by Dr. Navarro so I scheduled her for the next available appointment with Dr. Navarro an added her to the wait list patient verbalized understanding       ----- Message from Rayne Maguire sent at 1/27/2022 12:11 PM CST -----  Pt is requesting a call back in regards to getting a sooner appt. Pt states that she went to the ED for her hand and was told to follow up with the doctor. Pt can be reached at [phone] (vemm)

## 2022-01-31 ENCOUNTER — HOSPITAL ENCOUNTER (OUTPATIENT)
Dept: RADIOLOGY | Facility: HOSPITAL | Age: 77
Discharge: HOME | End: 2022-01-31
Attending: PODIATRIST
Payer: MEDICARE

## 2022-01-31 ENCOUNTER — OFFICE VISIT (OUTPATIENT)
Dept: CARDIOLOGY | Facility: CLINIC | Age: 77
End: 2022-01-31
Payer: MEDICARE

## 2022-01-31 ENCOUNTER — OFFICE VISIT (OUTPATIENT)
Dept: PODIATRY | Facility: CLINIC | Age: 77
End: 2022-01-31
Payer: MEDICARE

## 2022-01-31 VITALS
DIASTOLIC BLOOD PRESSURE: 62 MMHG | BODY MASS INDEX: 23.45 KG/M2 | HEART RATE: 76 BPM | HEIGHT: 64 IN | SYSTOLIC BLOOD PRESSURE: 118 MMHG | WEIGHT: 137.38 LBS | OXYGEN SATURATION: 99 %

## 2022-01-31 DIAGNOSIS — I99.8 FLUCTUATING BLOOD PRESSURE: ICD-10-CM

## 2022-01-31 DIAGNOSIS — E11.22 CONTROLLED TYPE 2 DIABETES MELLITUS WITH STAGE 3 CHRONIC KIDNEY DISEASE, WITHOUT LONG-TERM CURRENT USE OF INSULIN: ICD-10-CM

## 2022-01-31 DIAGNOSIS — M79.672 LEFT FOOT PAIN: Primary | ICD-10-CM

## 2022-01-31 DIAGNOSIS — E11.59 HYPERTENSION ASSOCIATED WITH DIABETES: ICD-10-CM

## 2022-01-31 DIAGNOSIS — M80.00XD AGE-RELATED OSTEOPOROSIS WITH CURRENT PATHOLOGICAL FRACTURE WITH ROUTINE HEALING, SUBSEQUENT ENCOUNTER: Primary | ICD-10-CM

## 2022-01-31 DIAGNOSIS — I63.9 CEREBROVASCULAR ACCIDENT (CVA), UNSPECIFIED MECHANISM: ICD-10-CM

## 2022-01-31 DIAGNOSIS — E55.9 VITAMIN D DEFICIENCY: ICD-10-CM

## 2022-01-31 DIAGNOSIS — M79.672 LEFT FOOT PAIN: ICD-10-CM

## 2022-01-31 DIAGNOSIS — R00.2 PALPITATIONS: ICD-10-CM

## 2022-01-31 DIAGNOSIS — R09.89 CAROTID BRUIT, UNSPECIFIED LATERALITY: ICD-10-CM

## 2022-01-31 DIAGNOSIS — E78.2 MIXED HYPERLIPIDEMIA: ICD-10-CM

## 2022-01-31 DIAGNOSIS — I15.2 HYPERTENSION ASSOCIATED WITH DIABETES: ICD-10-CM

## 2022-01-31 DIAGNOSIS — I25.10 CAD IN NATIVE ARTERY: Primary | ICD-10-CM

## 2022-01-31 DIAGNOSIS — N18.30 CONTROLLED TYPE 2 DIABETES MELLITUS WITH STAGE 3 CHRONIC KIDNEY DISEASE, WITHOUT LONG-TERM CURRENT USE OF INSULIN: ICD-10-CM

## 2022-01-31 DIAGNOSIS — I25.118 CORONARY ARTERY DISEASE OF NATIVE ARTERY OF NATIVE HEART WITH STABLE ANGINA PECTORIS: ICD-10-CM

## 2022-01-31 PROCEDURE — 3288F FALL RISK ASSESSMENT DOCD: CPT | Mod: HCNC,CPTII,S$GLB, | Performed by: STUDENT IN AN ORGANIZED HEALTH CARE EDUCATION/TRAINING PROGRAM

## 2022-01-31 PROCEDURE — 3288F PR FALLS RISK ASSESSMENT DOCUMENTED: ICD-10-PCS | Mod: HCNC,CPTII,S$GLB, | Performed by: STUDENT IN AN ORGANIZED HEALTH CARE EDUCATION/TRAINING PROGRAM

## 2022-01-31 PROCEDURE — 1159F MED LIST DOCD IN RCRD: CPT | Mod: HCNC,CPTII,S$GLB, | Performed by: STUDENT IN AN ORGANIZED HEALTH CARE EDUCATION/TRAINING PROGRAM

## 2022-01-31 PROCEDURE — 99214 OFFICE O/P EST MOD 30 MIN: CPT | Mod: S$GLB,,, | Performed by: PODIATRIST

## 2022-01-31 PROCEDURE — 99214 OFFICE O/P EST MOD 30 MIN: CPT | Mod: HCNC,S$GLB,, | Performed by: STUDENT IN AN ORGANIZED HEALTH CARE EDUCATION/TRAINING PROGRAM

## 2022-01-31 PROCEDURE — 1159F MED LIST DOCD IN RCRD: CPT | Mod: CPTII,S$GLB,, | Performed by: PODIATRIST

## 2022-01-31 PROCEDURE — 3072F PR LOW RISK FOR RETINOPATHY: ICD-10-PCS | Mod: CPTII,S$GLB,, | Performed by: PODIATRIST

## 2022-01-31 PROCEDURE — 73630 XR FOOT COMPLETE 3 VIEW LEFT: ICD-10-PCS | Mod: 26,HCNC,LT, | Performed by: RADIOLOGY

## 2022-01-31 PROCEDURE — 1126F PR PAIN SEVERITY QUANTIFIED, NO PAIN PRESENT: ICD-10-PCS | Mod: HCNC,CPTII,S$GLB, | Performed by: STUDENT IN AN ORGANIZED HEALTH CARE EDUCATION/TRAINING PROGRAM

## 2022-01-31 PROCEDURE — 1100F PTFALLS ASSESS-DOCD GE2>/YR: CPT | Mod: CPTII,S$GLB,, | Performed by: PODIATRIST

## 2022-01-31 PROCEDURE — 1160F PR REVIEW ALL MEDS BY PRESCRIBER/CLIN PHARMACIST DOCUMENTED: ICD-10-PCS | Mod: CPTII,S$GLB,, | Performed by: PODIATRIST

## 2022-01-31 PROCEDURE — 1159F PR MEDICATION LIST DOCUMENTED IN MEDICAL RECORD: ICD-10-PCS | Mod: HCNC,CPTII,S$GLB, | Performed by: STUDENT IN AN ORGANIZED HEALTH CARE EDUCATION/TRAINING PROGRAM

## 2022-01-31 PROCEDURE — 3072F LOW RISK FOR RETINOPATHY: CPT | Mod: HCNC,CPTII,S$GLB, | Performed by: STUDENT IN AN ORGANIZED HEALTH CARE EDUCATION/TRAINING PROGRAM

## 2022-01-31 PROCEDURE — 3288F PR FALLS RISK ASSESSMENT DOCUMENTED: ICD-10-PCS | Mod: CPTII,S$GLB,, | Performed by: PODIATRIST

## 2022-01-31 PROCEDURE — 73630 X-RAY EXAM OF FOOT: CPT | Mod: 26,HCNC,LT, | Performed by: RADIOLOGY

## 2022-01-31 PROCEDURE — 1101F PR PT FALLS ASSESS DOC 0-1 FALLS W/OUT INJ PAST YR: ICD-10-PCS | Mod: HCNC,CPTII,S$GLB, | Performed by: STUDENT IN AN ORGANIZED HEALTH CARE EDUCATION/TRAINING PROGRAM

## 2022-01-31 PROCEDURE — 3072F LOW RISK FOR RETINOPATHY: CPT | Mod: CPTII,S$GLB,, | Performed by: PODIATRIST

## 2022-01-31 PROCEDURE — 99999 PR PBB SHADOW E&M-EST. PATIENT-LVL II: ICD-10-PCS | Mod: PBBFAC,,, | Performed by: PODIATRIST

## 2022-01-31 PROCEDURE — 1126F AMNT PAIN NOTED NONE PRSNT: CPT | Mod: HCNC,CPTII,S$GLB, | Performed by: STUDENT IN AN ORGANIZED HEALTH CARE EDUCATION/TRAINING PROGRAM

## 2022-01-31 PROCEDURE — 1100F PR PT FALLS ASSESS DOC 2+ FALLS/FALL W/INJURY/YR: ICD-10-PCS | Mod: CPTII,S$GLB,, | Performed by: PODIATRIST

## 2022-01-31 PROCEDURE — 1101F PT FALLS ASSESS-DOCD LE1/YR: CPT | Mod: HCNC,CPTII,S$GLB, | Performed by: STUDENT IN AN ORGANIZED HEALTH CARE EDUCATION/TRAINING PROGRAM

## 2022-01-31 PROCEDURE — 99999 PR PBB SHADOW E&M-EST. PATIENT-LVL III: ICD-10-PCS | Mod: PBBFAC,HCNC,, | Performed by: STUDENT IN AN ORGANIZED HEALTH CARE EDUCATION/TRAINING PROGRAM

## 2022-01-31 PROCEDURE — 3074F PR MOST RECENT SYSTOLIC BLOOD PRESSURE < 130 MM HG: ICD-10-PCS | Mod: HCNC,CPTII,S$GLB, | Performed by: STUDENT IN AN ORGANIZED HEALTH CARE EDUCATION/TRAINING PROGRAM

## 2022-01-31 PROCEDURE — 99999 PR PBB SHADOW E&M-EST. PATIENT-LVL III: CPT | Mod: PBBFAC,HCNC,, | Performed by: STUDENT IN AN ORGANIZED HEALTH CARE EDUCATION/TRAINING PROGRAM

## 2022-01-31 PROCEDURE — 73630 X-RAY EXAM OF FOOT: CPT | Mod: TC,HCNC,LT

## 2022-01-31 PROCEDURE — 1160F RVW MEDS BY RX/DR IN RCRD: CPT | Mod: CPTII,S$GLB,, | Performed by: PODIATRIST

## 2022-01-31 PROCEDURE — 99214 PR OFFICE/OUTPT VISIT, EST, LEVL IV, 30-39 MIN: ICD-10-PCS | Mod: S$GLB,,, | Performed by: PODIATRIST

## 2022-01-31 PROCEDURE — 99212 OFFICE O/P EST SF 10 MIN: CPT | Mod: PBBFAC | Performed by: PODIATRIST

## 2022-01-31 PROCEDURE — 3072F PR LOW RISK FOR RETINOPATHY: ICD-10-PCS | Mod: HCNC,CPTII,S$GLB, | Performed by: STUDENT IN AN ORGANIZED HEALTH CARE EDUCATION/TRAINING PROGRAM

## 2022-01-31 PROCEDURE — 1159F PR MEDICATION LIST DOCUMENTED IN MEDICAL RECORD: ICD-10-PCS | Mod: CPTII,S$GLB,, | Performed by: PODIATRIST

## 2022-01-31 PROCEDURE — 99999 PR PBB SHADOW E&M-EST. PATIENT-LVL II: CPT | Mod: PBBFAC,,, | Performed by: PODIATRIST

## 2022-01-31 PROCEDURE — 99214 PR OFFICE/OUTPT VISIT, EST, LEVL IV, 30-39 MIN: ICD-10-PCS | Mod: HCNC,S$GLB,, | Performed by: STUDENT IN AN ORGANIZED HEALTH CARE EDUCATION/TRAINING PROGRAM

## 2022-01-31 PROCEDURE — 3074F SYST BP LT 130 MM HG: CPT | Mod: HCNC,CPTII,S$GLB, | Performed by: STUDENT IN AN ORGANIZED HEALTH CARE EDUCATION/TRAINING PROGRAM

## 2022-01-31 PROCEDURE — 3288F FALL RISK ASSESSMENT DOCD: CPT | Mod: CPTII,S$GLB,, | Performed by: PODIATRIST

## 2022-01-31 PROCEDURE — 3078F PR MOST RECENT DIASTOLIC BLOOD PRESSURE < 80 MM HG: ICD-10-PCS | Mod: HCNC,CPTII,S$GLB, | Performed by: STUDENT IN AN ORGANIZED HEALTH CARE EDUCATION/TRAINING PROGRAM

## 2022-01-31 PROCEDURE — 3078F DIAST BP <80 MM HG: CPT | Mod: HCNC,CPTII,S$GLB, | Performed by: STUDENT IN AN ORGANIZED HEALTH CARE EDUCATION/TRAINING PROGRAM

## 2022-01-31 NOTE — PROGRESS NOTES
Subjective:       Patient ID: Shireen Felix is a 76 y.o. female.    Chief Complaint: Foot Injury (Patient complains of 5/10 pain to left foot. She continues wearing walking boot to left foot and states she ambulates a little on the left foot. )      HPI:  Shireen Felix presents to the office today to follow-up on acute left foot pain.  She states that she has been performing minimal weight-bearing on the left foot.  Does keep Cam boot with compression in place.  Has noticed a reduction in her pain as well as a decrease in inflammation.  She states her pain today as a 5/10.  She does very little ambulating with using a wheelchair at home but does try to ambulate for very short distances as needed for transfers.  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 Patient's PMD is POOL Chaudhari Jr, MD.   Review of patient's allergies indicates:   Allergen Reactions    Penicillins Anaphylaxis, Hives, Shortness Of Breath and Swelling    Adhesive Rash    Betadine [povidone-iodine] Rash     Pt confirms has completed CT w/ IV contrast-iodine without reaction or need for pre-medication.    Doxycycline Nausea Only    Oxycodone Other (See Comments)     Fatigue      Sulfa (sulfonamide antibiotics) Itching    Zanaflex [tizanidine] Anxiety       Past Medical History:   Diagnosis Date    Abnormal ECG 8/5/2019    Aneurysm     Anticoagulant long-term use     Arthritis     CAD in native artery 8/5/2019    COPD (chronic obstructive pulmonary disease)     Diabetes mellitus     Fall 10/10/2019    Formatting of this note might be different from the original. Found sitting on floor next to bed last night Mild confusion today Does not recall falling or how she ended up on floor UA today Labs yesterday unremarkable    Glaucoma     Hypertension     Seizures     Shingles 05/27/2017    Stroke        Family History   Problem Relation Age of Onset    No Known Problems Mother     No Known  Problems Father        Social History     Socioeconomic History    Marital status: Single   Tobacco Use    Smoking status: Former Smoker     Packs/day: 1.00     Years: 10.00     Pack years: 10.00     Types: Cigarettes     Quit date: 2004     Years since quittin.8    Smokeless tobacco: Never Used   Substance and Sexual Activity    Alcohol use: No    Drug use: Never    Sexual activity: Not Currently   Social History Narrative    No pets or smokers in household.       Past Surgical History:   Procedure Laterality Date    BRAIN SURGERY      CARDIAC CATHETERIZATION      CORONARY ANGIOPLASTY      EPIDURAL STEROID INJECTION INTO LUMBAR SPINE Bilateral 2019    Procedure: TF XANDER L4/5;  Surgeon: Desean Dias MD;  Location: [facility];  Service: Pain Management;  Laterality: Bilateral;    HYSTERECTOMY      INJECTION OF ANESTHETIC AGENT AROUND MEDIAL BRANCH NERVES INNERVATING LUMBAR FACET JOINT Bilateral 2020    Procedure: Bilateral L3-5 MBB;  Surgeon: Desean Dias MD;  Location: [facility];  Service: Pain Management;  Laterality: Bilateral;    INJECTION OF ANESTHETIC AGENT INTO SACROILIAC JOINT Right 2021    Procedure: Right BLOCK, SACROILIAC JOINT Right GTB with RN IV sedation;  Surgeon: Yao Fulton MD;  Location: [facility];  Service: Pain Management;  Laterality: Right;    INJECTION OF JOINT Bilateral 2020    Procedure: Bilateral shoulder GH Joint injection with local;  Surgeon: Desean Dias MD;  Location: [facility];  Service: Pain Management;  Laterality: Bilateral;    TRANSFORAMINAL EPIDURAL INJECTION OF STEROID Bilateral 2019    Procedure: Bilateral L3/4 Transforaminal Epidural Steroid Injection;  Surgeon: Desean Dias MD;  Location: [facility];  Service: Pain Management;  Laterality: Bilateral;    TRANSFORAMINAL EPIDURAL INJECTION OF STEROID Bilateral 3/10/2020    Procedure: Right T12/L1 TF XANDER with local;  Surgeon: Desean Dias MD;   Location: [facility]IN [facility];  Service: Pain Management;  Laterality: Bilateral;    TRANSFORAMINAL EPIDURAL INJECTION OF STEROID Right 6/19/2020    Procedure: Right T12/L1 TFESI Covid day of procedure;  Surgeon: Desean Dias MD;  Location: [facility];  Service: Pain Management;  Laterality: Right;       Review of Systems       Objective:   LMP  (LMP Unknown)     X-Ray Foot Complete Left  Narrative: EXAMINATION:  XR FOOT COMPLETE 3 VIEW LEFT    CLINICAL HISTORY:  .  Pain in left foot    TECHNIQUE:  AP, lateral and oblique views of the left foot were performed.    COMPARISON:  01/06/2022    FINDINGS:  Chronic healed fracture deformity again noted involving the 3rd metatarsal shaft.  Some heterotopic ossification is again seen adjacent to the Lisfranc joint is suggestive of possible remote injury.  No abnormal widening of the Lisfranc joint space.  No acute fractures or dislocations visualized.  Subtalar screw again noted and unchanged in position.  There are mild scattered degenerative findings seen throughout the midfoot.  No erosive changes demonstrated.  Impression: Unchanged appearance of the left foot as above.    Electronically signed by: Neri Husain MD  Date:    01/31/2022  Time:    10:25       Physical Exam  LOWER EXTREMITY PHYSICAL EXAMINATION    VASCULAR: The right DP pulse is 2/4 and the left DP is 2/4. The right PT pulse is 2/4 and the left PT pulse is 2/4. Proximal to distal, warm to warm. No dependent rubor or elevation palor is noted. Capillary refill time is less than 3 seconds. Hair growth is appreciated to the dorsal foot and digits.    NEUROLOGY: Proprioception is intact. Sensation to light touch is intact. Protective sensation is intact via 5.07 Alpine Zara monofilament. Straight leg raise is negative. Negative Tinel's Sign and negative Valleix sign. No neurological sensations with compression of the area of Pierre's Nerve in the area of the Abductor Hallucis muscle belly. Upon  palpation of the interspaces, there are no neurological sensations stated that radiate proximal or distal. Upon compression of the metatarsal heads from medial to lateral, no neurological sensations or symptoms are stated.    DERMATOLOGY: Skin is supple, dry and intact. No ecchymosis is noted. No hypertrophic skin formation. No erythema or cellulitis is noted.     ORTHOPEDIC:  Pain on palpation of the left midfoot most specifically at the 3rd and 4th metatarsal bones.  There is moderate degeneration noted to the 1st tarsometatarsal joint which is showing signs of palpable exostosis present.  There is minimal discomfort to the 1st intermetatarsal space.  No concern for Lisfranc injury.  Ambulated with a severely antalgic gait.     Assessment:     1. Age-related osteoporosis with current pathological fracture with routine healing, subsequent encounter    2. Controlled type 2 diabetes mellitus with stage 3 chronic kidney disease, without long-term current use of insulin    3. Vitamin D deficiency        Plan:     Age-related osteoporosis with current pathological fracture with routine healing, subsequent encounter  -     HME - OTHER    Controlled type 2 diabetes mellitus with stage 3 chronic kidney disease, without long-term current use of insulin    Vitamin D deficiency      Thorough discussion is had with the patient this afternoon, concerning the diagnosis, its etiology, and the treatment algorithm at present.    Repeat x-rays were taken today and reviewed by myself with the patient room.  These were compared to previous imaging.  There appears show fracture to the 3rd and possibly 4th metatarsal base as previously discussed.  This does show some callusing surrounded the previous fracture.  This correlates the patient's pain clinically.  There is no significant pain to the 1st intermetatarsal space concerning for Lisfranc dislocation.    As patient has been nonweightbearing, virtually, for 4 weeks, I would recommend  that she initiate weight-bearing to the left foot with Cam boot in place.  Unfortunately, patient is very unstable due to history of multi articular joint degeneration in her low back.  She would benefit from a rolling walker with chair.    Continue to encourage patient to continue with her vitamin-D supplementation given her history of vitamin-D deficiency and history osteoporotic fracture.      Future Appointments   Date Time Provider Department Center   2/3/2022  8:55 AM LABORATORY, [facility] LAB [facility]   2/3/2022  9:00 AM Yao Fulton MD VC INT ALEXANDRA [facility] ER   2/16/2022  8:15 AM ONL XR1- [facility] XRAY [facility]   2/16/2022  8:45 AM Rizwan Navarro MD [facility] ORTHO  Medical C   3/1/2022  9:45 AM Elie Hobbs MD [facility] OPHTHAL  Medical C   4/5/2022  9:20 AM Jarrell Dye MD VC CARDIO [facility]   4/27/2022 10:00 AM LABORATORY, [facility] LAB [facility]   5/4/2022 10:40 AM ACRLEE Chaudhari Jr., MD [facility] IM [facility]   5/6/2022 12:00 PM LABORATORY, [facility] LAB [facility]   5/6/2022 12:30 PM Julien Hernandez MD [facility] RHEUM [facility]   5/6/2022  1:00 PM INJECTION 1, [facility] INFUSION [facility] INFSN [facility]   6/14/2022 11:00 AM Donna Ramirez NP [facility] UROLOGY  Medical C   6/14/2022  2:00 PM Priya Benavidez NP [facility] NEURO  Medical C

## 2022-01-31 NOTE — PROGRESS NOTES
Subjective:   Patient ID:  Shireen Felix is a 76 y.o. female who presents for cardiac consult of CAD in native artery      Follow-up    Cough    Coronary Artery Disease      The patient came in today for cardiac consult of CAD in native artery      Shireen Felix is a 76 y.o. female pt with CHF, CAD, CVI, prior CVA, seizure disorder, DM type II, HTN, and ANDREI here for follow up CV eval.     9/12/19  She had a fall last month was admitted to [facility] and had cardiac eval for CP with min elevated trop 1st set, ECHO normal, pain thought due to GI etiology.   She has been doing a lot of strenous activity and has been getting more SOB and CP with it. CP feels like pressure, worse with being more tired.   Had recent pulm eval had nodule, had overall stable lung capacity.   Prior CV - Dr. Garcias    1/23/20  Nuclear stress neg for ischemia. Holter neg. Added Dilt last visit. She has been going through PT for legs back but now pain is worse and will need to get Xrays. She also had cellulitis of hands/arms was on IV abx. Gets more tired/SOB more lately.     5/19/21  Follow up since 1/2020. She had some pulm issues as well as back pain. NO hospitalizations or surgeries. Had COVID vaccines. She has been having palpitations along with  Chest pain towards throat/back, feels like angina.   ECG - NSR, LAD, septal infarct,      7/28/21  BP and HR well controlled today. She had a death in family had to resched the cardiac testing. She continues to have SOB with palpitations. Feels full and pressure. She has not been to neuro yet.     9/10/21  Told pt - Holter revealed elevated heart rates with occasional PVCS and fast rhythms - increase metoprolol to 50mg twice a day and continue Diltiazem 120mg, monitor Bp and heart rates, follow up with me in 2-3 weeks.    Nuclear stress and ECHO neg.  She had recent sinus infection she has been fighting. BP today stable. Had more edema at times.     Patient feels no PND, no syncope, no CNS  symptoms.    Patient has fairly good exercise tolerance.    Patient is compliant with medications.    ECG - sinus tach V rate 124        1/31/22  Sees Dr. Dye in cardiology clinic.   Reports falling about 1 month ago, has brace on left leg, and wrist splint.  Palpitations have been stable. No dizziness or syncope.   Reports having COVID recently.   Denies chest pain, SOB.   BP controlled today.          HOLTER  Sinus rhythm with heart rates varying between 75 and 136 BPM with an average of 95 BPM.     Maximum heart rate recorded at: 13:43 CDT on day 2.     Minimum heart rate recorded at 02:25 CDT on day 1.   PVC    Ventricular Arrhythmias  There were rare PVCs totalling 510 and averaging 5.38 per hour. There were 6 couplets. There were 9 triplets.   PAC    Supraventricular Arrhythmias  There were very rare PACs totalling 37 and averaging 0.39 per hour.         Results for orders placed during the hospital encounter of 08/17/21    Echo Color Flow Doppler? Yes    Interpretation Summary  · The left ventricle is normal in size with concentric hypertrophy and normal systolic function.  · The estimated ejection fraction is 60%.  · Normal left ventricular diastolic function.  · With normal right ventricular systolic function.    Results for orders placed during the hospital encounter of 08/17/21    Nuclear Stress - Cardiology Interpreted    Interpretation Summary    Normal myocardial perfusion scan. There is no evidence of myocardial ischemia or infarction.    The gated perfusion images showed an ejection fraction of 69% at rest. The gated perfusion images showed an ejection fraction of 58% post stress.    There is normal wall motion at rest and post stress.    LV cavity size is normal at rest and normal at stress.    The EKG portion of this study is negative for ischemia.    The patient reported no chest pain during the stress test.    During stress, rare PVCs are noted.      Past Medical History:   Diagnosis  Date    Abnormal ECG 8/5/2019    Aneurysm     Anticoagulant long-term use     Arthritis     CAD in native artery 8/5/2019    COPD (chronic obstructive pulmonary disease)     Diabetes mellitus     Fall 10/10/2019    Formatting of this note might be different from the original. Found sitting on floor next to bed last night Mild confusion today Does not recall falling or how she ended up on floor UA today Labs yesterday unremarkable    Glaucoma     Hypertension     Seizures     Shingles 05/27/2017    Stroke        Past Surgical History:   Procedure Laterality Date    BRAIN SURGERY      CARDIAC CATHETERIZATION      CORONARY ANGIOPLASTY      EPIDURAL STEROID INJECTION INTO LUMBAR SPINE Bilateral 11/12/2019    Procedure: TF XANDER L4/5;  Surgeon: Desean Dias MD;  Location: [facility]IN [facility];  Service: Pain Management;  Laterality: Bilateral;    HYSTERECTOMY      INJECTION OF ANESTHETIC AGENT AROUND MEDIAL BRANCH NERVES INNERVATING LUMBAR FACET JOINT Bilateral 1/31/2020    Procedure: Bilateral L3-5 MBB;  Surgeon: Desean Dias MD;  Location: [facility] PAIN [facility]GT;  Service: Pain Management;  Laterality: Bilateral;    INJECTION OF ANESTHETIC AGENT INTO SACROILIAC JOINT Right 11/16/2021    Procedure: Right BLOCK, SACROILIAC JOINT Right GTB with RN IV sedation;  Surgeon: Yao Fulton MD;  Location: [facility] PAIN [facility]GT;  Service: Pain Management;  Laterality: Right;    INJECTION OF JOINT Bilateral 7/9/2020    Procedure: Bilateral shoulder GH Joint injection with local;  Surgeon: Desean Dias MD;  Location: [facility] PAIN [facility]GT;  Service: Pain Management;  Laterality: Bilateral;    TRANSFORAMINAL EPIDURAL INJECTION OF STEROID Bilateral 7/23/2019    Procedure: Bilateral L3/4 Transforaminal Epidural Steroid Injection;  Surgeon: Desean Dias MD;  Location: [facility] PAIN [facility]GT;  Service: Pain Management;  Laterality: Bilateral;    TRANSFORAMINAL EPIDURAL INJECTION OF STEROID Bilateral 3/10/2020    Procedure: Right T12/L1 TF  XANDER with local;  Surgeon: Desean Dias MD;  Location: [facility] PAIN [facility]GT;  Service: Pain Management;  Laterality: Bilateral;    TRANSFORAMINAL EPIDURAL INJECTION OF STEROID Right 2020    Procedure: Right T12/L1 TFESI Covid day of procedure;  Surgeon: Desean Dias MD;  Location: [facility]IN [facility];  Service: Pain Management;  Laterality: Right;       Social History     Tobacco Use    Smoking status: Former Smoker     Packs/day: 1.00     Years: 10.00     Pack years: 10.00     Types: Cigarettes     Quit date: 2004     Years since quittin.8    Smokeless tobacco: Never Used   Substance Use Topics    Alcohol use: No    Drug use: Never       Family History   Problem Relation Age of Onset    No Known Problems Mother     No Known Problems Father        Patient's Medications   New Prescriptions    No medications on file   Previous Medications    ACETAMINOPHEN (TYLENOL) 500 MG TABLET    Take 500 mg by mouth as needed for Pain.    ALBUTEROL (PROVENTIL/VENTOLIN HFA) 90 MCG/ACTUATION INHALER    INHALE 2 PUFFS INTO THE LUNGS EVERY 4 HOURS AS NEEDED    AMLODIPINE (NORVASC) 10 MG TABLET    Take 1 tablet (10 mg total) by mouth once daily.    ATORVASTATIN (LIPITOR) 40 MG TABLET    Take 1 tablet (40 mg total) by mouth once daily.    BREO ELLIPTA 200-25 MCG/DOSE DSDV DISKUS INHALER    INHALE 1 PUFF INTO THE LUNGS EVERY EVENING    CHOLECALCIFEROL, VITAMIN D3, 1,250 MCG (50,000 UNIT) CAPSULE    Take 1 capsule (50,000 Units total) by mouth every 7 days.    CLOPIDOGREL (PLAVIX) 75 MG TABLET    TAKE 1 TABLET BY MOUTH ONCE DAILY    DENOSUMAB (PROLIA) 60 MG/ML SYRG    every 6 (six) months.    DEXILANT 60 MG CAPSULE    Take 1 capsule by mouth once daily.    DICLOFENAC (VOLTAREN) 25 MG TBEC    TAKE 1 TABLET BY MOUTH TWICE DAILY    DILTIAZEM (CARDIZEM LA) 120 MG 24 HR TABLET    Take 1 tablet (120 mg total) by mouth once daily.    ESCITALOPRAM OXALATE (LEXAPRO) 20 MG TABLET    Take 1 tablet (20 mg total) by mouth once daily.     ESLICARBAZEPINE (APTIOM) 800 MG TAB    Take 1,200 mg by mouth every evening.    FLUTICASONE PROPIONATE (FLONASE) 50 MCG/ACTUATION NASAL SPRAY    SPRAY 1 SPRAY IN EACH NOSTRIL EVERY DAY    GABAPENTIN (NEURONTIN) 100 MG CAPSULE    Take 100 mg by mouth 2 (two) times daily as needed.    HYDROCHLOROTHIAZIDE (HYDRODIURIL) 25 MG TABLET    TAKE 1 TABLET BY MOUTH DAILY AS NEEDED    LATANOPROST 0.005 % OPHTHALMIC SOLUTION    Place 1 drop into both eyes every evening.    METFORMIN (GLUCOPHAGE) 500 MG TABLET    TAKE 1 TABLET BY MOUTH ONCE DAILY    METHYLPREDNISOLONE (MEDROL DOSEPACK) 4 MG TABLET    USE UD    METOPROLOL TARTRATE (LOPRESSOR) 50 MG TABLET    Take 1 tablet (50 mg total) by mouth 2 (two) times daily.    MIRABEGRON (MYRBETRIQ) 50 MG TB24    Take 1 tablet (50 mg total) by mouth once daily.    MONTELUKAST (SINGULAIR) 10 MG TABLET    TAKE 1 TABLET BY MOUTH ONCE DAILY    NITROGLYCERIN (NITROSTAT) 0.4 MG SL TABLET    DISSOLVE 1 TABLET UNDER TONGUE EVERY 5 MINUTES FOR CHEST PAIN (MAY TAKE UP TO 3 DOSES THEN SEEK MEDICAL ATTENTION)    OMEPRAZOLE (PRILOSEC) 40 MG CAPSULE    Take 1 capsule (40 mg total) by mouth every morning.    RANOLAZINE (RANEXA) 1,000 MG TB12    TAKE 1 TABLET BY MOUTH TWICE DAILY    ROFLUMILAST (DALIRESP) 500 MCG TAB    Take 1 tablet (500 mcg total) by mouth once daily.    SPIRIVA WITH HANDIHALER 18 MCG INHALATION CAPSULE    INHALE THE CONTENTS OF 1 CAPSULE VIA HANDIHALER. INHALE AND HOLD BREATH THEN EXHALE AND REPEAT (2 INHALATIONS TOTAL). USE ONCE DAILY.    TIZANIDINE (ZANAFLEX) 4 MG TABLET    TAKE 1 TABLET BY MOUTH TWICE DAILY AS NEEDED    VALSARTAN (DIOVAN) 320 MG TABLET    Take 1 tablet (320 mg total) by mouth once daily.   Modified Medications    No medications on file   Discontinued Medications    No medications on file       Review of Systems   Constitutional: Negative.    HENT: Negative.    Eyes: Negative.    Gastrointestinal: Negative.    Genitourinary: Negative.    Musculoskeletal: Negative.   "  Skin: Negative.    Neurological: Negative.    Endo/Heme/Allergies: Negative.    Psychiatric/Behavioral: Negative.    All 12 systems otherwise negative.      Wt Readings from Last 3 Encounters:   01/31/22 62.3 kg (137 lb 5.6 oz)   12/14/21 62.9 kg (138 lb 10.7 oz)   11/16/21 62.9 kg (138 lb 12.5 oz)     Temp Readings from Last 3 Encounters:   01/16/22 98.8 °F (37.1 °C) (Oral)   01/06/22 99.2 °F (37.3 °C) (Oral)   12/14/21 97.7 °F (36.5 °C)     BP Readings from Last 3 Encounters:   01/31/22 118/62   01/16/22 (!) 159/69   01/06/22 (!) 128/59     Pulse Readings from Last 3 Encounters:   01/31/22 76   01/16/22 89   01/06/22 84       /62 (BP Location: Right arm, Patient Position: Sitting, BP Method: Medium (Manual))   Pulse 76   Ht 5' 4" (1.626 m)   Wt 62.3 kg (137 lb 5.6 oz)   LMP  (LMP Unknown)   SpO2 99%   BMI 23.58 kg/m²     Objective:   Physical Exam  Vitals and nursing note reviewed.   Constitutional:       General: She is not in acute distress.     Appearance: She is well-developed. She is not diaphoretic.   HENT:      Head: Normocephalic and atraumatic.      Nose: Nose normal.   Eyes:      General: No scleral icterus.     Conjunctiva/sclera: Conjunctivae normal.   Neck:      Thyroid: No thyromegaly.      Vascular: No JVD.   Cardiovascular:      Rate and Rhythm: Normal rate and regular rhythm.      Heart sounds: S1 normal and S2 normal. No murmur heard.  No friction rub. No gallop. No S3 or S4 sounds.    Pulmonary:      Effort: Pulmonary effort is normal. No respiratory distress.      Breath sounds: Normal breath sounds. No stridor. No wheezing or rales.   Chest:      Chest wall: No tenderness.   Abdominal:      General: Bowel sounds are normal. There is no distension.      Palpations: Abdomen is soft. There is no mass.      Tenderness: There is no abdominal tenderness. There is no rebound.   Genitourinary:     Comments: Deferred  Musculoskeletal:         General: No tenderness or deformity. Normal " range of motion.      Cervical back: Normal range of motion and neck supple.   Lymphadenopathy:      Cervical: No cervical adenopathy.   Skin:     General: Skin is warm and dry.      Coloration: Skin is not pale.      Findings: No erythema or rash.   Neurological:      Mental Status: She is alert and oriented to person, place, and time.      Motor: No abnormal muscle tone.      Coordination: Coordination normal.   Psychiatric:         Behavior: Behavior normal.         Thought Content: Thought content normal.         Judgment: Judgment normal.         Lab Results   Component Value Date     01/06/2022    K 4.5 01/06/2022     01/06/2022    CO2 22 (L) 01/06/2022    BUN 13 01/06/2022    CREATININE 0.8 01/06/2022     (H) 01/06/2022    HGBA1C 4.9 10/27/2021    MG 1.9 12/25/2019    AST 23 01/06/2022    ALT 27 01/06/2022    ALBUMIN 3.8 01/06/2022    PROT 7.7 01/06/2022    BILITOT 0.6 01/06/2022    WBC 10.45 01/06/2022    HGB 12.1 01/06/2022    HCT 37.0 01/06/2022    MCV 95 01/06/2022     01/06/2022    INR 1.0 11/05/2020    TSH 1.249 08/09/2021    CHOL 141 12/02/2020    HDL 59 12/02/2020    LDLCALC 61.2 (L) 12/02/2020    TRIG 104 12/02/2020    BNP 89 07/26/2019     Assessment:      1. CAD in native artery    2. Cerebrovascular accident (CVA), unspecified mechanism    3. Coronary artery disease of native artery of native heart with stable angina pectoris    4. Hypertension associated with diabetes    5. Mixed hyperlipidemia    6. Fluctuating blood pressure    7. Controlled type 2 diabetes mellitus with stage 3 chronic kidney disease, without long-term current use of insulin    8. Palpitations    9. Carotid bruit, unspecified laterality        Plan:   1. CAD with angina, stable   - cont plavix, BB, Ranexa  - pharm nuclear stress test - neg  - order ECHO - normal   - PRN NTG  - cont statin    2. HTN - with occ edema  - rec low salt diet  - cont meds  - HCTZ PRN     3. CHF  - ECHO normal   - stable  now    4. CVA/Seizures  - cont meds per neuro  - carotid u/s - normal  - lipid panel today    5. Palpitation with tachycardia  - cont Dilt, BB    6. COPD  - cont meds per PCP/pulm    7. Debility  - f/u with PT/OT      RTC in 2 months with Dr. Dye    Thank you for allowing me to participate in this patient's care. Please do not hesitate to contact me with any questions or concerns.

## 2022-02-03 ENCOUNTER — TELEPHONE (OUTPATIENT)
Dept: PAIN MEDICINE | Facility: CLINIC | Age: 77
End: 2022-02-03

## 2022-02-03 ENCOUNTER — LAB VISIT (OUTPATIENT)
Dept: LAB | Facility: HOSPITAL | Age: 77
End: 2022-02-03
Attending: STUDENT IN AN ORGANIZED HEALTH CARE EDUCATION/TRAINING PROGRAM
Payer: MEDICARE

## 2022-02-03 ENCOUNTER — OFFICE VISIT (OUTPATIENT)
Dept: PAIN MEDICINE | Facility: CLINIC | Age: 77
End: 2022-02-03
Payer: MEDICARE

## 2022-02-03 ENCOUNTER — TELEPHONE (OUTPATIENT)
Dept: ORTHOPEDICS | Facility: CLINIC | Age: 77
End: 2022-02-03
Payer: MEDICARE

## 2022-02-03 VITALS — DIASTOLIC BLOOD PRESSURE: 75 MMHG | HEART RATE: 86 BPM | SYSTOLIC BLOOD PRESSURE: 154 MMHG

## 2022-02-03 DIAGNOSIS — I63.9 CEREBROVASCULAR ACCIDENT (CVA), UNSPECIFIED MECHANISM: ICD-10-CM

## 2022-02-03 DIAGNOSIS — G89.4 CHRONIC PAIN SYNDROME: ICD-10-CM

## 2022-02-03 DIAGNOSIS — M47.816 LUMBAR SPONDYLOSIS: Primary | ICD-10-CM

## 2022-02-03 DIAGNOSIS — M79.18 MYOFASCIAL MUSCLE PAIN: ICD-10-CM

## 2022-02-03 DIAGNOSIS — M53.3 SACROILIAC JOINT DYSFUNCTION: ICD-10-CM

## 2022-02-03 DIAGNOSIS — M47.816 LUMBAR FACET ARTHROPATHY: ICD-10-CM

## 2022-02-03 LAB
CHOLEST SERPL-MCNC: 134 MG/DL (ref 120–199)
CHOLEST/HDLC SERPL: 2.3 {RATIO} (ref 2–5)
HDLC SERPL-MCNC: 59 MG/DL (ref 40–75)
HDLC SERPL: 44 % (ref 20–50)
LDLC SERPL CALC-MCNC: 58 MG/DL (ref 63–159)
NONHDLC SERPL-MCNC: 75 MG/DL
TRIGL SERPL-MCNC: 85 MG/DL (ref 30–150)

## 2022-02-03 PROCEDURE — 3078F DIAST BP <80 MM HG: CPT | Mod: HCNC,CPTII,S$GLB, | Performed by: ANESTHESIOLOGY

## 2022-02-03 PROCEDURE — 3288F PR FALLS RISK ASSESSMENT DOCUMENTED: ICD-10-PCS | Mod: HCNC,CPTII,S$GLB, | Performed by: ANESTHESIOLOGY

## 2022-02-03 PROCEDURE — 99999 PR PBB SHADOW E&M-EST. PATIENT-LVL III: CPT | Mod: PBBFAC,HCNC,, | Performed by: ANESTHESIOLOGY

## 2022-02-03 PROCEDURE — 3072F PR LOW RISK FOR RETINOPATHY: ICD-10-PCS | Mod: HCNC,CPTII,S$GLB, | Performed by: ANESTHESIOLOGY

## 2022-02-03 PROCEDURE — 80061 LIPID PANEL: CPT | Mod: HCNC | Performed by: STUDENT IN AN ORGANIZED HEALTH CARE EDUCATION/TRAINING PROGRAM

## 2022-02-03 PROCEDURE — 99214 OFFICE O/P EST MOD 30 MIN: CPT | Mod: 25,HCNC,S$GLB, | Performed by: ANESTHESIOLOGY

## 2022-02-03 PROCEDURE — 20552 NJX 1/MLT TRIGGER POINT 1/2: CPT | Mod: HCNC,S$GLB,, | Performed by: ANESTHESIOLOGY

## 2022-02-03 PROCEDURE — 1159F PR MEDICATION LIST DOCUMENTED IN MEDICAL RECORD: ICD-10-PCS | Mod: HCNC,CPTII,S$GLB, | Performed by: ANESTHESIOLOGY

## 2022-02-03 PROCEDURE — 1125F AMNT PAIN NOTED PAIN PRSNT: CPT | Mod: HCNC,CPTII,S$GLB, | Performed by: ANESTHESIOLOGY

## 2022-02-03 PROCEDURE — 1100F PR PT FALLS ASSESS DOC 2+ FALLS/FALL W/INJURY/YR: ICD-10-PCS | Mod: HCNC,CPTII,S$GLB, | Performed by: ANESTHESIOLOGY

## 2022-02-03 PROCEDURE — 1125F PR PAIN SEVERITY QUANTIFIED, PAIN PRESENT: ICD-10-PCS | Mod: HCNC,CPTII,S$GLB, | Performed by: ANESTHESIOLOGY

## 2022-02-03 PROCEDURE — 99999 PR PBB SHADOW E&M-EST. PATIENT-LVL III: ICD-10-PCS | Mod: PBBFAC,HCNC,, | Performed by: ANESTHESIOLOGY

## 2022-02-03 PROCEDURE — 3288F FALL RISK ASSESSMENT DOCD: CPT | Mod: HCNC,CPTII,S$GLB, | Performed by: ANESTHESIOLOGY

## 2022-02-03 PROCEDURE — 20552 PR INJECT TRIGGER POINT, 1 OR 2: ICD-10-PCS | Mod: HCNC,S$GLB,, | Performed by: ANESTHESIOLOGY

## 2022-02-03 PROCEDURE — 3078F PR MOST RECENT DIASTOLIC BLOOD PRESSURE < 80 MM HG: ICD-10-PCS | Mod: HCNC,CPTII,S$GLB, | Performed by: ANESTHESIOLOGY

## 2022-02-03 PROCEDURE — 3077F SYST BP >= 140 MM HG: CPT | Mod: HCNC,CPTII,S$GLB, | Performed by: ANESTHESIOLOGY

## 2022-02-03 PROCEDURE — 3077F PR MOST RECENT SYSTOLIC BLOOD PRESSURE >= 140 MM HG: ICD-10-PCS | Mod: HCNC,CPTII,S$GLB, | Performed by: ANESTHESIOLOGY

## 2022-02-03 PROCEDURE — 1160F RVW MEDS BY RX/DR IN RCRD: CPT | Mod: HCNC,CPTII,S$GLB, | Performed by: ANESTHESIOLOGY

## 2022-02-03 PROCEDURE — 1100F PTFALLS ASSESS-DOCD GE2>/YR: CPT | Mod: HCNC,CPTII,S$GLB, | Performed by: ANESTHESIOLOGY

## 2022-02-03 PROCEDURE — 1159F MED LIST DOCD IN RCRD: CPT | Mod: HCNC,CPTII,S$GLB, | Performed by: ANESTHESIOLOGY

## 2022-02-03 PROCEDURE — 1160F PR REVIEW ALL MEDS BY PRESCRIBER/CLIN PHARMACIST DOCUMENTED: ICD-10-PCS | Mod: HCNC,CPTII,S$GLB, | Performed by: ANESTHESIOLOGY

## 2022-02-03 PROCEDURE — 36415 COLL VENOUS BLD VENIPUNCTURE: CPT | Mod: HCNC | Performed by: STUDENT IN AN ORGANIZED HEALTH CARE EDUCATION/TRAINING PROGRAM

## 2022-02-03 PROCEDURE — 3072F LOW RISK FOR RETINOPATHY: CPT | Mod: HCNC,CPTII,S$GLB, | Performed by: ANESTHESIOLOGY

## 2022-02-03 PROCEDURE — 99214 PR OFFICE/OUTPT VISIT, EST, LEVL IV, 30-39 MIN: ICD-10-PCS | Mod: 25,HCNC,S$GLB, | Performed by: ANESTHESIOLOGY

## 2022-02-03 RX ORDER — METHYLPREDNISOLONE ACETATE 40 MG/ML
40 INJECTION, SUSPENSION INTRA-ARTICULAR; INTRALESIONAL; INTRAMUSCULAR; SOFT TISSUE
Status: COMPLETED | OUTPATIENT
Start: 2022-02-03 | End: 2022-02-03

## 2022-02-03 RX ADMIN — METHYLPREDNISOLONE ACETATE 40 MG: 40 INJECTION, SUSPENSION INTRA-ARTICULAR; INTRALESIONAL; INTRAMUSCULAR; SOFT TISSUE at 10:02

## 2022-02-03 NOTE — TELEPHONE ENCOUNTER
Spoke to patient regarding appt time. Advised that she can be here at soon as 7 am and can be worked up before her appt time of 8:45am. Pt verbalized understanding.

## 2022-02-03 NOTE — TELEPHONE ENCOUNTER
----- Message from Papi Mitchell sent at 2/3/2022  3:02 PM CST -----  Contact: Jetty  Patient is calling to speak with the nurse to notify that Dr Jared Mckeon has not yet received the referral from Dr Fulton. Please give patient a call back at [phone] to discuss further.  Thanks,  RP

## 2022-02-03 NOTE — PROGRESS NOTES
Chief Pain Complaint:  Neck Pain   Lumbar Back Pain   Facial Pain       History of Present Illness:   Shireen Felix is a 76 y.o. female  who is presenting with a chief complaint of Neck Pain. The patient began experiencing this problem insidiously, and the pain has been gradually worsening over the past 6 month(s). The pain is described as throbbing, cramping, aching and heavy and is located in the bilateral cervical spine. Pain is intermittent and lasts hours. The  pain is nonradiating. The patient rates her pain a 7 out of ten and interferes with activities of daily living a 7 out of ten. Pain is exacerbated by rotation of the cervical spine, and is improved by rest. Patient reports no prior trauma, no prior spinal surgery     This patient is a 74 y.o. female who presents today complaining of the above noted pain/s. The patient describes the pain as follows.  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  She reports approximately 2 years ago she had to sudden deaths in the family which caused very stressful time for her and resulted in shingles rash in the left V1 distribution however she reports having excruciating pain in the left V1 and V2 distributions.  She denies having difficulty with eating food and brushing her teeth on the left side of her face however the left lateral side of her nose gets her excruciating pain.  She has been tried on numerous medications in the past including gabapentin, Lyrica, Valtrex, Celebrex, ibuprofen which have all provided minimal benefit however steroids have been most helpful.  Today she rates her pain as an 4/10 and describes a constant burning sensation the left side of her face in addition to radiation into her bilateral lower extremities, her right shoulder, her left upper extremity occasionally as a pins and needle sensation.  She  does report having numbness and weakness in her left lower extremity.  She has been physical therapy which she completed in February of 2019 however this caused most of her low back and leg symptoms to worsen in addition to her post herpetic neuralgia to worsen.  She denies having bowel bladder difficulties.  Her symptoms are worse with activity such as walking in her somewhat improved with rest however she had finds it difficult to get into a comfortable position. She has been using topical lidocaine patches and applying to the left side of her face which does provide significant benefit.  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    Shireen Felix is a 76 y.o. female  who is presenting with a chief complaint of lumbar spine. The patient began experiencing this problem insidiously, and the pain has been gradually worsening over the past 2 year(s). The pain is described as throbbing, shooting, burning, aching and electrical and is located in the bilateral lumbar spine. Pain is intermittent and lasts hours. The pain radiates to bilateral lower extremities. The patient rates her pain a 7 out of ten and interferes with activities of daily living a 6 out of ten. Pain is exacerbated by flexion of the lumbar spine, and is improved by rest. Patient reports no prior trauma, no prior spinal surgery       - pertinent negatives: No fever, No chills, No weight loss, No bladder dysfunction, No bowel dysfunction, No saddle anesthesia  - pertinent positives: generalized nonspecific Lower Extremity weakness bilaterally    - medications, other therapies tried (physical therapy, injections):     >> NSAIDs, Tylenol, Tramadol, Norco, gabapentin, lyrica, flexeril and medrol dose pack    >> Has previously undergone Physical Therapy    >> Has previously undergone spinal injection/s           Bilateral L3/4 TF XANDER on 8/4/19 with 75% pain relief, bilateral L4/5 transforaminal epidural steroid injections with 80% symptomatic pain relief, bilateral L3-5 lumbar medial branch blocks with 100% symptomatic pain relief, T12-L1 transforaminal epidural steroid injection with 100% symptomatic pain relief         Right SI Right GTB injection on 11/16/21 with 80% relief      Imaging / Labs / Studies (reviewed on 2/3/2022):    Results for orders placed during the hospital encounter of 02/06/20    CT Lumbar Spine Without Contrast    Narrative  EXAMINATION:  CT LUMBAR SPINE WITHOUT CONTRAST    CLINICAL HISTORY:  Low back painLow back pain, >6wks conservative tx, persistent-progressive sx, surgical candidate;    FINDINGS:  The lumbar vertebral bodies demonstrate adequate alignment.Disc space narrowing from T11 through S1.No acute fractures are identified.    T11-T12: Disc space narrowing.  Broad-based disc bulge.  Old vertebral endplate fracture of T12.    T12-L1: Small disc protrusion paramedian to the right side.  Degenerative changes of the vertebral endplates and facet joints.    L1-L2: Old fracture of the inferior endplate of L1.  Mild posterior displacement of a fracture fragment minimally narrowing the AP diameter of the canal.  Gas in the disc space consistent with degenerating disc material.    L2-L3: Circumferential disc bulge.  Bilateral degenerative changes of the facets.  Hypertrophy of the ligamentum flavum.  Moderate to severe central spinal stenosis.  Bony neural foraminal narrowing without definite nerve root impingement.    L3-L4: Circumferential disc bulge.  Bilateral degenerative changes of the facets.  Degenerative changes of the vertebral endplates.  Severe central spinal stenosis.  Hypertrophy of the ligamentum flavum and degenerative changes of the facets.    L4-L5: Circumferential disc bulge.  Severe central spinal stenosis.  Bilateral degenerative changes of the facets.  Bilateral bony neural  foraminal narrowing and possible bilateral L4 nerve root impingement.    L5-S1: Circumferential disc bulge.  Gas formation within the disc.  Moderate central spinal stenosis.  Bilateral degenerative changes of the facets.  Bilateral bony neural foraminal narrowing with possible bilateral L5 nerve root impingement.    Atherosclerotic abdominal aorta without aneurysmal dilatation.    Impression  Mild lumbar scoliotic curvature with multilevel degenerative changes as described above.  Disc space narrowings are seen most significantly at the L3-4 and L4-5 levels.  Bilateral bony neural foraminal narrowings are seen most significantly at the L3-4 and L4-5 levels.  Neural foraminal narrowing is also significantly identified on the left side at the L5-S1 level with possible left-sided L5 nerve root impingement.  Other degenerative changes as described above.  Evidence of an old L1 compression fracture with a healed fragment of the inferior posterior L1 vertebra mildly impinging on the ventral thecal sac.    Comparison to the prior study dated 08/06/2019 shows that the L1 fracture occurred in the interval.    All CT scans at this facility are performed  using dose modulation techniques as appropriate to performed exam including the following:  automated exposure control; adjustment of mA and/or kV according to the patients size (this includes techniques or standardized protocols for targeted exams where dose is matched to indication/reason for exam: i.e. extremities or head);  iterative reconstruction technique.      Electronically signed by: Brody Kumari MD  Date:    02/06/2020  Time:    08:28    Results for orders placed during the hospital encounter of 07/31/19    CT Cervical Spine Without Contrast    Narrative  EXAMINATION:  CT CERVICAL SPINE WITHOUT CONTRAST    CLINICAL HISTORY:  C-spine trauma, NEXUS/CCR positive, +risk factor(s);    TECHNIQUE:  Contiguous axial CT images were obtained of the cervical spine without  intravenous contrast. Coronal and sagittal reformations were acquired.    COMPARISON:  None    FINDINGS:  Osteopenia.  Grade 2 degenerative spondylolisthesis at C3-C4.  Grade 1 degenerative spondylolisthesis at C4-C5.  Vertebral body height is normal.  No acute fractures. No prevertebral soft tissue swelling. Atlanto-occipital articulation is normal.  Congenitally patulous spinal canal.  No significant spinal stenosis.  Moderate left foraminal stenosis at C3-C4.    Impression  No acute findings.  Degenerative spondylolisthesis at C3-C4 and C4-C5.    All CT scans at this facility use dose modulation, iterative reconstruction, and/or weight base dosing when appropriate to reduce radiation dose to as low as reasonably achievable.      Electronically signed by: Zeke Mccarthy Jr., MD  Date:    07/31/2019  Time:    12:32      Results for orders placed during the hospital encounter of 10/31/19    X-Ray Lumbar Complete With Flex And Ext    Narrative  EXAMINATION:  XR LUMBAR SPINE 5 VIEW WITH FLEX AND EXT    CLINICAL HISTORY:  low back pain;  Pain in right hip    TECHNIQUE:  Five views of the lumbar spine plus flexion extension views were performed.    COMPARISON:  06/04/2019    FINDINGS:  Mild levoscoliosis of the lumbar spine is unchanged.  Chronic T12 compression fracture deformity appears unchanged.  No definite spondylolisthesis demonstrated.  No change in spinal alignment with flexion or extension to suggest instability.  Multilevel disc height loss again noted and unchanged, remaining most severe from L3-4 through L5-S1.  No pars defects visualized.  Posterior elements appear grossly intact. No acute fractures or subluxations are demonstrated.  Visualized osseous structures appear diffusely osteopenic.  Prior bilateral hip arthroplasties noted.    Impression  Unchanged appearance of the lumbar spine as above.      Electronically signed by: Neri Husain MD  Date:    10/31/2019  Time:    12:57    Results for orders  placed during the hospital encounter of 01/23/20    X-Ray Lumbar Spine Ap And Lateral    Narrative  EXAMINATION:  XR LUMBAR SPINE AP AND LATERAL    CLINICAL HISTORY:  Low back pain, >6wks conservative tx, persistent-progressive sx, surgical candidate;Low back pain    TECHNIQUE:  AP, lateral and spot images were performed of the lumbar spine.    COMPARISON:  10/31/2019    FINDINGS:  Levoscoliosis present.  Vertebral body heights stable.  Alignment unchanged without spondylolisthesis.  Similar appearing multilevel degenerative disc height loss and osteophyte findings noted.  Multilevel facet degenerative findings remain.  Aorta iliac atherosclerotic calcification.  Colonic constipation findings present.    Impression  Similar appearance of the lumbar spine with prominent multilevel degenerative findings.  Constipation.      Electronically signed by: Davy Gutierrez MD  Date:    01/23/2020  Time:    15:36    Results for orders placed during the hospital encounter of 06/04/19    X-Ray Cervical Spine AP And Lateral    Narrative  EXAMINATION:  XR CERVICAL SPINE AP LATERAL    CLINICAL HISTORY:  Spondylosis without myelopathy or radiculopathy, cervical region    TECHNIQUE:  AP, lateral and open mouth views of the cervical spine were performed.    COMPARISON:  None.    FINDINGS:  Reversal normal C-spine curvature noted on the lateral view with visualization to the C7-T1 level.  Minimal anterior subluxation C4 on C5 noted.  There is multilevel marginal spurring and varying degrees of loss of disc height throughout the C-spine.  No acute fracture identified in prevertebral soft tissues, C1-2 articulation and odontoid appear within normal limits allowing for positioning.  Minimal vascular calcification identified on the left in the area of the carotid vessels.    Impression  Multilevel cervical spondylosis with straightening of the normal C-spine curvature and minimal anterior subluxation C4 on C5.    No acute  fracture.    Follow-up and/or further evaluation as warranted.      Electronically signed by: Mario Flanagan MD  Date:    06/05/2019  Time:    07:51      Review of Systems:  CONSTITUTIONAL: patient denies any fever, chills, or weight loss  SKIN: patient denies any rash or itching  RESPIRATORY: patient denies having any shortness of breath  GASTROINTESTINAL: patient denies having any diarrhea, constipation, or bowel incontinence  GENITOURINARY: patient denies having any abnormal bladder function    MUSCULOSKELETAL:  - patient complains of the above noted pain/s (see chief pain complaint)    NEUROLOGICAL:   - pain as above  - strength in Upper and Lower extremities is decreased, BILATERALLY  - sensation in Upper extremities is intact, BILATERALLY  - patient denies any loss of bowel or bladder control      PSYCHIATRIC: patient denies any change in mood    Other:  All other systems reviewed and are negative      Physical Exam:  BP (!) 154/75   Pulse 86   LMP  (LMP Unknown)  (reviewed on 2/3/2022)  General: Alert and oriented, in no apparent distress.  Gait: normal gait.  Skin: No rashes, No discoloration, No obvious lesions  HEENT: Normocephalic, atraumatic. Pupils equal and round.  Cardiovascular: Regular rate and rhythm , no significant peripheral edema present  Respiratory: Without audible wheezing, without use of accessory muscles of respiration.    Musculoskeletal:    Cervical Spine    - Pain on flexion of cervical spine Absent  - Spurling's Test:  Absent    - Pain on extension of cervical spine Present  - TTP over the cervical facet joints Present bilateral C6-7  - Cervical facet loading Present    -TTP over bilateral rhomboids       Lumbar Spine    - Pain on flexion of lumbar spine Present  - Straight Leg Raise:  Equivocal    - Pain on extension of lumbar spine Present  - TTP over the lumbar facet joints Present bilateral L5-S1   - Lumbar facet loading Present    -Pain on palpation over the SI joint  Absent  -  CARL: Absent      Neuro:    Strength:  UE R/L: D: 5/5; B: 5/5; T: 4/4; WF: 5/5; WE: 5/5; IO: 5/5;  LE R/L: HF: 4/4, HE: 4/4, KF: 4/4; KE: 4/4; FE: 5/5; FF: 5/5    Extremity Reflexes: Brisk and symmetric throughout.      Extremity Sensory: Sensation to pinprick and temperature symmetric. Proprioception intact.      Psych:  Mood and affect is appropriate      Assessment:    Shireen Felix is a 76 y.o. year old female who is presenting with   Encounter Diagnoses   Name Primary?    Lumbar spondylosis Yes    Lumbar facet arthropathy     Sacroiliac joint dysfunction     Myofascial muscle pain     Chronic pain syndrome        Plan:    1. Interventional: Consider Right then Left L3, 4,5 MBB RFA. Patient will call to schedule. Bilateral Trapezius TPI done in clinic.      S/p Right SI Right GTB injection on 11/16/21 with 80% relief.      Consider bilateral C5, 6,7 MBB. .    2. Pharmacologic: Continue Tizanidine 4 mg PO BID PRN. Consider increasing Gabapentin from 100 mg PO BID.     3. Rehabilitative: Encouraged ambulation.    4. Diagnostic: Cervical CT reviewed.     5. Consult: Patient is seeing Neurosurgery who proposed posterior decompression/fusion of lumbar spine but she is not wanting surgery at this time.       6. Follow up: Post injection.    PROCEDURE NOTE    Diagnosis: Myofascial pain  Procedure: trigger point injections to Bilateral Trapezius     Risks and benefits of procedure explained to patient including risks of infection, bleeding, pain, or damage to surrounding tissues. All questions answered. Informed consent obtained prior to proceeding. Areas marked and prepped in sterile fashion. Using a 27g 1.25inch needle, a 8 cc mixture of depo medrol 1cc (40mg) and 1% lidocaine was injected evenly into the above mentioned muscles. None to minimal bleeding noted. ER and post injection instructions given.    20 minutes were spent in this encounter with more than 50% of the time used for counseling and review  of the plan.  Imaging / studies reviewed, detailed above.  I discussed in detail the risks, benefits, and alternatives to any and all potential treatment options.  All questions and concerns were fully addressed today in clinic. Medical decision making moderate.    Thank you for the opportunity to assist in the care of this patient.    Best wishes,    Signed:    Yao Fulton MD          Disclaimer:  This note may have been prepared using voice recognition software, it may have not been extensively proofed, as such there could be errors within the text such as sound alike errors.

## 2022-02-08 ENCOUNTER — TELEPHONE (OUTPATIENT)
Dept: RHEUMATOLOGY | Facility: CLINIC | Age: 77
End: 2022-02-08
Payer: MEDICARE

## 2022-02-08 NOTE — TELEPHONE ENCOUNTER
Pt would like a refill of prednisone sent to the pharmacy. Pt said that Dr. Gaona had prescribed her this and she has been taking it for a long time, but for some reason it is now off her chart. Pt says she thinks Dr. Fulton may have taken it off because they given her some medrol dose packs to take. However, pt says that she needs her prednisone daily because she is having a lot of pain in her hands, feet, neck. Please advise.      Pt will see you in May

## 2022-02-09 NOTE — TELEPHONE ENCOUNTER
MD Clarita Contreras MA; David Victoria Staff 2 hours ago (10:31 AM)         Chronic use of systemic steroids not recommended for osteoarthritis management.  Recommend evaluation in clinic per schedule availability.        Pt informed of above reccomendations by Dr. Hernandez.  Follow up scheduled 2/10/22

## 2022-02-10 ENCOUNTER — TELEPHONE (OUTPATIENT)
Dept: RHEUMATOLOGY | Facility: CLINIC | Age: 77
End: 2022-02-10
Payer: MEDICARE

## 2022-02-10 NOTE — TELEPHONE ENCOUNTER
----- Message from Karen Royal sent at 2/10/2022  7:40 AM CST -----  Please call pt @ [phone] regarding appt cancel to day, pt nee dot reschedule appt.

## 2022-02-10 NOTE — TELEPHONE ENCOUNTER
Left message for patient that Dr. Hernandez does not have availability for patient to be scheduled before her May appt with Dr. Hernandez. Advised her to keep her appts that are scheduled in May

## 2022-02-14 ENCOUNTER — TELEPHONE (OUTPATIENT)
Dept: PHYSICAL MEDICINE AND REHAB | Facility: CLINIC | Age: 77
End: 2022-02-14
Payer: MEDICARE

## 2022-02-14 NOTE — TELEPHONE ENCOUNTER
----- Message from Leila Potts sent at 2/14/2022  2:18 PM CST -----  Regarding: appt request  Contact: pt  Pt calling to see if Dr Stevens is the dr that she her for her neck and request an appt. [phone]

## 2022-02-15 ENCOUNTER — TELEPHONE (OUTPATIENT)
Dept: PAIN MEDICINE | Facility: CLINIC | Age: 77
End: 2022-02-15
Payer: MEDICARE

## 2022-02-15 RX ORDER — METHYLPREDNISOLONE 4 MG/1
TABLET ORAL
Qty: 1 EACH | Refills: 0 | Status: SHIPPED | OUTPATIENT
Start: 2022-02-15 | End: 2022-03-03 | Stop reason: SDUPTHER

## 2022-02-15 NOTE — TELEPHONE ENCOUNTER
----- Message from Irwin Alex sent at 2/15/2022  1:18 PM CST -----  Contact: IZABELLA SHEA [[id number]]  .Type:  Sooner Apoointment Request    Caller is requesting a sooner appointment.  Caller declined first available appointment listed below.  Caller will not accept being placed on the waitlist and is requesting a message be sent to doctor.  Name of Caller:IZABELLA SHEA [[id number]]  When is the first available appointment?  03/31/2022   Symptoms:  neck  pains  Would the patient rather a call back or a response via [facility] [employer]?   Call    Best Call Back Number:  [phone] (home)   Additional Information:

## 2022-02-15 NOTE — TELEPHONE ENCOUNTER
Pt scheduled per Dr. Fulton 3/9/2022. Dr. Fulton sent in medrol dose terrence to help with increased neck pain. All questions answered.

## 2022-02-16 ENCOUNTER — OFFICE VISIT (OUTPATIENT)
Dept: PODIATRY | Facility: CLINIC | Age: 77
End: 2022-02-16
Payer: MEDICARE

## 2022-02-16 ENCOUNTER — OFFICE VISIT (OUTPATIENT)
Dept: ORTHOPEDICS | Facility: CLINIC | Age: 77
End: 2022-02-16
Payer: MEDICARE

## 2022-02-16 ENCOUNTER — HOSPITAL ENCOUNTER (OUTPATIENT)
Dept: RADIOLOGY | Facility: HOSPITAL | Age: 77
Discharge: HOME | End: 2022-02-16
Attending: ORTHOPAEDIC SURGERY
Payer: MEDICARE

## 2022-02-16 VITALS
HEART RATE: 80 BPM | HEIGHT: 64 IN | WEIGHT: 137 LBS | BODY MASS INDEX: 23.39 KG/M2 | DIASTOLIC BLOOD PRESSURE: 68 MMHG | SYSTOLIC BLOOD PRESSURE: 131 MMHG

## 2022-02-16 VITALS — BODY MASS INDEX: 23.39 KG/M2 | WEIGHT: 137 LBS | HEIGHT: 64 IN

## 2022-02-16 DIAGNOSIS — M80.00XD AGE-RELATED OSTEOPOROSIS WITH CURRENT PATHOLOGICAL FRACTURE WITH ROUTINE HEALING, SUBSEQUENT ENCOUNTER: ICD-10-CM

## 2022-02-16 DIAGNOSIS — N18.30 CONTROLLED TYPE 2 DIABETES MELLITUS WITH STAGE 3 CHRONIC KIDNEY DISEASE, WITHOUT LONG-TERM CURRENT USE OF INSULIN: ICD-10-CM

## 2022-02-16 DIAGNOSIS — M80.00XD AGE-RELATED OSTEOPOROSIS WITH CURRENT PATHOLOGICAL FRACTURE WITH ROUTINE HEALING, SUBSEQUENT ENCOUNTER: Primary | ICD-10-CM

## 2022-02-16 DIAGNOSIS — Z86.69 HISTORY OF BRAIN DISORDER: ICD-10-CM

## 2022-02-16 DIAGNOSIS — M79.672 LEFT FOOT PAIN: Primary | ICD-10-CM

## 2022-02-16 DIAGNOSIS — M25.531 BILATERAL WRIST PAIN: ICD-10-CM

## 2022-02-16 DIAGNOSIS — M25.532 BILATERAL WRIST PAIN: ICD-10-CM

## 2022-02-16 DIAGNOSIS — M18.0 ARTHRITIS OF CARPOMETACARPAL (CMC) JOINT OF BOTH THUMBS: Primary | ICD-10-CM

## 2022-02-16 DIAGNOSIS — E11.22 CONTROLLED TYPE 2 DIABETES MELLITUS WITH STAGE 3 CHRONIC KIDNEY DISEASE, WITHOUT LONG-TERM CURRENT USE OF INSULIN: ICD-10-CM

## 2022-02-16 DIAGNOSIS — E55.9 VITAMIN D DEFICIENCY: ICD-10-CM

## 2022-02-16 PROCEDURE — 99213 PR OFFICE/OUTPT VISIT, EST, LEVL III, 20-29 MIN: ICD-10-PCS | Mod: 25,HCNC,S$GLB, | Performed by: ORTHOPAEDIC SURGERY

## 2022-02-16 PROCEDURE — 3075F SYST BP GE 130 - 139MM HG: CPT | Mod: HCNC,CPTII,S$GLB, | Performed by: ORTHOPAEDIC SURGERY

## 2022-02-16 PROCEDURE — 99214 PR OFFICE/OUTPT VISIT, EST, LEVL IV, 30-39 MIN: ICD-10-PCS | Mod: HCNC,S$GLB,, | Performed by: PODIATRIST

## 2022-02-16 PROCEDURE — 1159F PR MEDICATION LIST DOCUMENTED IN MEDICAL RECORD: ICD-10-PCS | Mod: HCNC,CPTII,S$GLB, | Performed by: PODIATRIST

## 2022-02-16 PROCEDURE — 1160F RVW MEDS BY RX/DR IN RCRD: CPT | Mod: HCNC,CPTII,S$GLB, | Performed by: PODIATRIST

## 2022-02-16 PROCEDURE — 1160F PR REVIEW ALL MEDS BY PRESCRIBER/CLIN PHARMACIST DOCUMENTED: ICD-10-PCS | Mod: HCNC,CPTII,S$GLB, | Performed by: PODIATRIST

## 2022-02-16 PROCEDURE — 73110 X-RAY EXAM OF WRIST: CPT | Mod: 26,50,HCNC, | Performed by: RADIOLOGY

## 2022-02-16 PROCEDURE — 3288F PR FALLS RISK ASSESSMENT DOCUMENTED: ICD-10-PCS | Mod: HCNC,CPTII,S$GLB, | Performed by: ORTHOPAEDIC SURGERY

## 2022-02-16 PROCEDURE — 3288F PR FALLS RISK ASSESSMENT DOCUMENTED: ICD-10-PCS | Mod: HCNC,CPTII,S$GLB, | Performed by: PODIATRIST

## 2022-02-16 PROCEDURE — 1159F PR MEDICATION LIST DOCUMENTED IN MEDICAL RECORD: ICD-10-PCS | Mod: HCNC,CPTII,S$GLB, | Performed by: ORTHOPAEDIC SURGERY

## 2022-02-16 PROCEDURE — 73630 X-RAY EXAM OF FOOT: CPT | Mod: 26,HCNC,LT, | Performed by: RADIOLOGY

## 2022-02-16 PROCEDURE — 3078F DIAST BP <80 MM HG: CPT | Mod: HCNC,CPTII,S$GLB, | Performed by: ORTHOPAEDIC SURGERY

## 2022-02-16 PROCEDURE — 3075F PR MOST RECENT SYSTOLIC BLOOD PRESS GE 130-139MM HG: ICD-10-PCS | Mod: HCNC,CPTII,S$GLB, | Performed by: ORTHOPAEDIC SURGERY

## 2022-02-16 PROCEDURE — 3072F LOW RISK FOR RETINOPATHY: CPT | Mod: HCNC,CPTII,S$GLB, | Performed by: PODIATRIST

## 2022-02-16 PROCEDURE — 73630 XR FOOT COMPLETE 3 VIEW LEFT: ICD-10-PCS | Mod: 26,HCNC,LT, | Performed by: RADIOLOGY

## 2022-02-16 PROCEDURE — 3072F LOW RISK FOR RETINOPATHY: CPT | Mod: HCNC,CPTII,S$GLB, | Performed by: ORTHOPAEDIC SURGERY

## 2022-02-16 PROCEDURE — 73630 X-RAY EXAM OF FOOT: CPT | Mod: TC,HCNC,LT

## 2022-02-16 PROCEDURE — 73110 XR WRIST COMPLETE 3 VIEWS BILATERAL: ICD-10-PCS | Mod: 26,50,HCNC, | Performed by: RADIOLOGY

## 2022-02-16 PROCEDURE — 1101F PR PT FALLS ASSESS DOC 0-1 FALLS W/OUT INJ PAST YR: ICD-10-PCS | Mod: HCNC,CPTII,S$GLB, | Performed by: PODIATRIST

## 2022-02-16 PROCEDURE — 3288F FALL RISK ASSESSMENT DOCD: CPT | Mod: HCNC,CPTII,S$GLB, | Performed by: PODIATRIST

## 2022-02-16 PROCEDURE — 99999 PR PBB SHADOW E&M-EST. PATIENT-LVL III: ICD-10-PCS | Mod: PBBFAC,HCNC,, | Performed by: PODIATRIST

## 2022-02-16 PROCEDURE — 99999 PR PBB SHADOW E&M-EST. PATIENT-LVL III: CPT | Mod: PBBFAC,HCNC,, | Performed by: ORTHOPAEDIC SURGERY

## 2022-02-16 PROCEDURE — 1125F PR PAIN SEVERITY QUANTIFIED, PAIN PRESENT: ICD-10-PCS | Mod: HCNC,CPTII,S$GLB, | Performed by: ORTHOPAEDIC SURGERY

## 2022-02-16 PROCEDURE — 99999 PR PBB SHADOW E&M-EST. PATIENT-LVL III: ICD-10-PCS | Mod: PBBFAC,HCNC,, | Performed by: ORTHOPAEDIC SURGERY

## 2022-02-16 PROCEDURE — 1125F AMNT PAIN NOTED PAIN PRSNT: CPT | Mod: HCNC,CPTII,S$GLB, | Performed by: PODIATRIST

## 2022-02-16 PROCEDURE — 1100F PR PT FALLS ASSESS DOC 2+ FALLS/FALL W/INJURY/YR: ICD-10-PCS | Mod: HCNC,CPTII,S$GLB, | Performed by: ORTHOPAEDIC SURGERY

## 2022-02-16 PROCEDURE — 1159F MED LIST DOCD IN RCRD: CPT | Mod: HCNC,CPTII,S$GLB, | Performed by: PODIATRIST

## 2022-02-16 PROCEDURE — 1101F PT FALLS ASSESS-DOCD LE1/YR: CPT | Mod: HCNC,CPTII,S$GLB, | Performed by: PODIATRIST

## 2022-02-16 PROCEDURE — 99213 OFFICE O/P EST LOW 20 MIN: CPT | Mod: 25,HCNC,S$GLB, | Performed by: ORTHOPAEDIC SURGERY

## 2022-02-16 PROCEDURE — 99999 PR PBB SHADOW E&M-EST. PATIENT-LVL III: CPT | Mod: PBBFAC,HCNC,, | Performed by: PODIATRIST

## 2022-02-16 PROCEDURE — 3078F PR MOST RECENT DIASTOLIC BLOOD PRESSURE < 80 MM HG: ICD-10-PCS | Mod: HCNC,CPTII,S$GLB, | Performed by: ORTHOPAEDIC SURGERY

## 2022-02-16 PROCEDURE — 20600 SMALL JOINT ASPIRATION/INJECTION: R THUMB CMC, L THUMB CMC: ICD-10-PCS | Mod: 50,HCNC,S$GLB, | Performed by: ORTHOPAEDIC SURGERY

## 2022-02-16 PROCEDURE — 20600 DRAIN/INJ JOINT/BURSA W/O US: CPT | Mod: 50,HCNC,S$GLB, | Performed by: ORTHOPAEDIC SURGERY

## 2022-02-16 PROCEDURE — 1100F PTFALLS ASSESS-DOCD GE2>/YR: CPT | Mod: HCNC,CPTII,S$GLB, | Performed by: ORTHOPAEDIC SURGERY

## 2022-02-16 PROCEDURE — 1125F PR PAIN SEVERITY QUANTIFIED, PAIN PRESENT: ICD-10-PCS | Mod: HCNC,CPTII,S$GLB, | Performed by: PODIATRIST

## 2022-02-16 PROCEDURE — 73110 X-RAY EXAM OF WRIST: CPT | Mod: TC,50,HCNC

## 2022-02-16 PROCEDURE — 99214 OFFICE O/P EST MOD 30 MIN: CPT | Mod: HCNC,S$GLB,, | Performed by: PODIATRIST

## 2022-02-16 PROCEDURE — 3072F PR LOW RISK FOR RETINOPATHY: ICD-10-PCS | Mod: HCNC,CPTII,S$GLB, | Performed by: ORTHOPAEDIC SURGERY

## 2022-02-16 PROCEDURE — 3072F PR LOW RISK FOR RETINOPATHY: ICD-10-PCS | Mod: HCNC,CPTII,S$GLB, | Performed by: PODIATRIST

## 2022-02-16 PROCEDURE — 1125F AMNT PAIN NOTED PAIN PRSNT: CPT | Mod: HCNC,CPTII,S$GLB, | Performed by: ORTHOPAEDIC SURGERY

## 2022-02-16 PROCEDURE — 1159F MED LIST DOCD IN RCRD: CPT | Mod: HCNC,CPTII,S$GLB, | Performed by: ORTHOPAEDIC SURGERY

## 2022-02-16 PROCEDURE — 3288F FALL RISK ASSESSMENT DOCD: CPT | Mod: HCNC,CPTII,S$GLB, | Performed by: ORTHOPAEDIC SURGERY

## 2022-02-16 RX ORDER — TRIAMCINOLONE ACETONIDE 40 MG/ML
40 INJECTION, SUSPENSION INTRA-ARTICULAR; INTRAMUSCULAR
Status: DISCONTINUED | OUTPATIENT
Start: 2022-02-16 | End: 2022-02-16 | Stop reason: HOSPADM

## 2022-02-16 RX ADMIN — TRIAMCINOLONE ACETONIDE 40 MG: 40 INJECTION, SUSPENSION INTRA-ARTICULAR; INTRAMUSCULAR at 08:02

## 2022-02-16 NOTE — PROGRESS NOTES
Subjective:       Patient ID: Shireen Felix is a 76 y.o. female.    Chief Complaint: Follow-up (F/u for left foot fx, rates pain 6/10, wears cam walker on the left, had x-rays today, diabetic, last seen PCP Dr. Chaudhari on 11/03/21)      HPI:  Shireen Felix presents to the office today to follow-up on acute left foot pain.  S reports that she attempted to transition to regular shoe gear but does states severe pain.  Rates her current pain level is a 6/10 at present with ambulation in Cam boot.  Did have x-rays performed today to assess for fracturing.  She does very little ambulating with using a wheelchair at home but does try to ambulate for very short distances as needed for transfers.  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 Patient's PMD is POOL Chaudhari Jr, MD.   Review of patient's allergies indicates:   Allergen Reactions    Penicillins Anaphylaxis, Hives, Shortness Of Breath and Swelling    Adhesive Rash    Betadine [povidone-iodine] Rash     Pt confirms has completed CT w/ IV contrast-iodine without reaction or need for pre-medication.    Doxycycline Nausea Only    Oxycodone Other (See Comments)     Fatigue      Sulfa (sulfonamide antibiotics) Itching    Zanaflex [tizanidine] Anxiety       Past Medical History:   Diagnosis Date    Abnormal ECG 8/5/2019    Aneurysm     Anticoagulant long-term use     Arthritis     CAD in native artery 8/5/2019    COPD (chronic obstructive pulmonary disease)     Diabetes mellitus     Fall 10/10/2019    Formatting of this note might be different from the original. Found sitting on floor next to bed last night Mild confusion today Does not recall falling or how she ended up on floor UA today Labs yesterday unremarkable    Glaucoma     Hypertension     Seizures     Shingles 05/27/2017    Stroke        Family History   Problem Relation Age of Onset    No Known Problems Mother     No Known Problems Father         Social History     Socioeconomic History    Marital status: Single   Tobacco Use    Smoking status: Former Smoker     Packs/day: 1.00     Years: 10.00     Pack years: 10.00     Types: Cigarettes     Quit date: 2004     Years since quittin.8    Smokeless tobacco: Never Used   Substance and Sexual Activity    Alcohol use: No    Drug use: Never    Sexual activity: Not Currently   Social History Narrative    No pets or smokers in household.       Past Surgical History:   Procedure Laterality Date    BRAIN SURGERY      CARDIAC CATHETERIZATION      CORONARY ANGIOPLASTY      EPIDURAL STEROID INJECTION INTO LUMBAR SPINE Bilateral 2019    Procedure: TF XANDER L4/5;  Surgeon: Desean Dias MD;  Location: [facility] PAIN MGT;  Service: Pain Management;  Laterality: Bilateral;    HYSTERECTOMY      INJECTION OF ANESTHETIC AGENT AROUND MEDIAL BRANCH NERVES INNERVATING LUMBAR FACET JOINT Bilateral 2020    Procedure: Bilateral L3-5 MBB;  Surgeon: Desean Dias MD;  Location: [facility] PAIN MGT;  Service: Pain Management;  Laterality: Bilateral;    INJECTION OF ANESTHETIC AGENT INTO SACROILIAC JOINT Right 2021    Procedure: Right BLOCK, SACROILIAC JOINT Right GTB with RN IV sedation;  Surgeon: Yao Fulton MD;  Location: [facility] PAIN MGT;  Service: Pain Management;  Laterality: Right;    INJECTION OF JOINT Bilateral 2020    Procedure: Bilateral shoulder GH Joint injection with local;  Surgeon: Desean Dias MD;  Location: [facility] PAIN MGT;  Service: Pain Management;  Laterality: Bilateral;    TRANSFORAMINAL EPIDURAL INJECTION OF STEROID Bilateral 2019    Procedure: Bilateral L3/4 Transforaminal Epidural Steroid Injection;  Surgeon: Desean Dias MD;  Location: [facility] PAIN MGT;  Service: Pain Management;  Laterality: Bilateral;    TRANSFORAMINAL EPIDURAL INJECTION OF STEROID Bilateral 3/10/2020    Procedure: Right T12/L1 TF XANDER with local;  Surgeon: Desean Dias MD;  Location: [facility]  "PAIN MGT;  Service: Pain Management;  Laterality: Bilateral;    TRANSFORAMINAL EPIDURAL INJECTION OF STEROID Right 6/19/2020    Procedure: Right T12/L1 TFESI Covid day of procedure;  Surgeon: Desean Dias MD;  Location: [facility] PAIN MGT;  Service: Pain Management;  Laterality: Right;       Review of Systems       Objective:   Ht 5' 4" (1.626 m)   Wt 62.1 kg (137 lb)   LMP  (LMP Unknown)   BMI 23.52 kg/m²     X-Ray Foot Complete Left  Narrative: EXAMINATION:  XR FOOT COMPLETE 3 VIEW LEFT    CLINICAL HISTORY:  .  Age-related osteoporosis with current pathological fracture, unspecified site, subsequent encounter for fracture with routine healing    TECHNIQUE:  AP, lateral and oblique views of the left foot were performed.    COMPARISON:  Left foot radiographs January 31, 2022    FINDINGS:  Generalized osteopenia.  Subtalar screw is unchanged in appearance.  Old healed fracture of the left 3rd metatarsal shaft is again noted.  Unchanged mild heterotopic calcification adjacent to the Lisfranc joint potentially represents sequela of old injury.  No new left foot fracture identified.  Unchanged bipartite sesamoid bone associated with the 1st metatarsophalangeal joint.  Alignment cannot be assessed on nonweightbearing radiographs.  Mild polyarticular midfoot osteoarthritis.  No osseous erosion.  Small plantar calcaneal spur.  No definite acute soft tissue abnormality.  Impression: As above.    Electronically signed by: Riley Tomlinson  Date:    02/16/2022  Time:    08:23  X-Ray Wrist Complete Bilateral  Narrative: EXAMINATION:  XR WRIST COMPLETE 3 VIEWS BILATERAL    CLINICAL HISTORY:  Pain in right wrist    TECHNIQUE:  PA, lateral, and oblique views of both wrists were performed.    COMPARISON:  05/08/2020    FINDINGS:  Osteoarthritic changes are present of the right wrist greatest at the 1st carpometacarpal joint and triscaphe joint.  There is radial subluxation of the 1st metacarpal on the trapezium.  No evidence of " acute fracture is present.  The soft tissues are unremarkable.  Impression: Osteoarthritis greatest at the 1st carpometacarpal joint and triscaphe joint.    Radial subluxation of the 1st metacarpal on the trapezium.    Electronically signed by: Brody Tristan  Date:    02/16/2022  Time:    08:21       Physical Exam  LOWER EXTREMITY PHYSICAL EXAMINATION    VASCULAR: The right DP pulse is 2/4 and the left DP is 2/4. The right PT pulse is 2/4 and the left PT pulse is 2/4. Proximal to distal, warm to warm. No dependent rubor or elevation palor is noted. Capillary refill time is less than 3 seconds. Hair growth is appreciated to the dorsal foot and digits.    NEUROLOGY: Proprioception is intact. Sensation to light touch is intact. Protective sensation is intact via 5.07 [place] Zara monofilament. Straight leg raise is negative. Negative Tinel's Sign and negative Valleix sign. No neurological sensations with compression of the area of Pierre's Nerve in the area of the Abductor Hallucis muscle belly. Upon palpation of the interspaces, there are no neurological sensations stated that radiate proximal or distal. Upon compression of the metatarsal heads from medial to lateral, no neurological sensations or symptoms are stated.    DERMATOLOGY: Skin is supple, dry and intact. No ecchymosis is noted. No hypertrophic skin formation. No erythema or cellulitis is noted.     ORTHOPEDIC:  Moderate to severe pain on palpation of the left midfoot most specifically at the 3rd and 4th metatarsal bones.  There is also reproducible pain on palpation of the 1st tarsometatarsal joint on the lateral aspect which is new pain.  There is also significant pain with push-off at this area.  There is moderate degeneration noted to the 1st tarsometatarsal joint which is showing signs of palpable exostosis present.  Ambulated with a severely antalgic gait.     Assessment:     1. Left foot pain    2. Age-related osteoporosis with current  pathological fracture with routine healing, subsequent encounter    3. History of brain aneurysm with stent    4. Controlled type 2 diabetes mellitus with stage 3 chronic kidney disease, without long-term current use of insulin    5. Vitamin D deficiency        Plan:     Left foot pain  -     CT Foot Without Contrast Left; Future; Expected date: 02/23/2022    Age-related osteoporosis with current pathological fracture with routine healing, subsequent encounter  -     CT Foot Without Contrast Left; Future; Expected date: 02/23/2022    History of brain aneurysm with stent    Controlled type 2 diabetes mellitus with stage 3 chronic kidney disease, without long-term current use of insulin    Vitamin D deficiency      Thorough discussion is had with the patient this afternoon, concerning the diagnosis, its etiology, and the treatment algorithm at present.    Repeat x-rays were taken today and reviewed by myself with the patient room.  These were compared to previous imaging.  There appears show fracture to the 3rd and possibly 4th metatarsal base as previously discussed.  There is concerned of slight transverse plane pathology present to the 1st tarsometatarsal joint between the 1st and 2nd metatarsal bones.  As patient is having significant pain on ambulation and push off, I would recommend getting a CT scan to this area.    Ideally common MRI would be better to show ligamentous pathology, however patient does have history of a brain stent secondary to brain aneurysm.    Will discuss CT results with patient's determine underlying pathology assessment fractures    Continue to encourage patient to continue with her vitamin-D supplementation given her history of vitamin-D deficiency and history osteoporotic fracture.      Future Appointments   Date Time Provider Department Center   2/24/2022 11:30 AM [facility] CT1 LIMIT 500 LBS [facility] CT SCAN [place]   3/1/2022  9:45 AM Elie Hobbs MD [facility]    3/7/2022  9:30 AM Kaia Parrish DPM [facility] POD [facility]   3/9/2022  8:00 AM Yao Fulton MD [facility] INT [facility]   4/5/2022  9:20 AM Jarrell Dye MD [facility] CARDIO [facility]   4/27/2022 10:00 AM LABORATORY, [facility] LAB [facility]   5/4/2022 10:40 AM CARLEE Chaudhari Jr., MD [facility] IM [facility]   5/6/2022 12:00 PM LABORATORY, [facility] LAB [facility]   5/6/2022 12:30 PM Julien Hernandez MD [facility] RHEUM [facility]   5/6/2022  1:00 PM INJECTION 1, [facility] INFUSION [facility] INFSN [facility]   5/17/2022  9:45 AM Rizwan Navarro MD [facility] ORTHO [facility]   6/14/2022 11:00 AM Donna Ramirez NP [facility] UROLOGY [facility]   6/14/2022  2:00 PM Priya Benavidez NP [facility] NEURO [facility]

## 2022-02-16 NOTE — PROCEDURES
Small Joint Aspiration/Injection: R thumb CMC, L thumb CMC    Date/Time: 2/16/2022 8:45 AM  Performed by: Rizwan Navarro MD  Authorized by: Rizwan Navarro MD     Consent Done?:  Yes (Verbal)  Indications:  Arthritis  Timeout: prior to procedure the correct patient, procedure, and site was verified    Local anesthesia used?: Yes    Local anesthetic:  Lidocaine 2% without epinephrine  Anesthetic total (ml):  1    Location:  Thumb  Site:  R thumb CMC and L thumb CMC  Ultrasonic guidance for needle placement?: No    Needle size:  25 G  Approach:  Dorsal  Medications:  40 mg triamcinolone acetonide 40 mg/mL; 40 mg triamcinolone acetonide 40 mg/mL

## 2022-02-16 NOTE — PROGRESS NOTES
Subjective:     Patient ID: Shireen Felix is a 76 y.o. female.    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  she was last injected 11/02/2021 with good relief until recently and requests reinjection today.  She already has thumb spica splints    Past Medical History:   Diagnosis Date    Abnormal ECG 8/5/2019    Aneurysm     Anticoagulant long-term use     Arthritis     CAD in native artery 8/5/2019    COPD (chronic obstructive pulmonary disease)     Diabetes mellitus     Fall 10/10/2019    Formatting of this note might be different from the original. Found sitting on floor next to bed last night Mild confusion today Does not recall falling or how she ended up on floor UA today Labs yesterday unremarkable    Glaucoma     Hypertension     Seizures     Shingles 05/27/2017    Stroke      Past Surgical History:   Procedure Laterality Date    BRAIN SURGERY      CARDIAC CATHETERIZATION      CORONARY ANGIOPLASTY      EPIDURAL STEROID INJECTION INTO LUMBAR SPINE Bilateral 11/12/2019    Procedure: TF XANDER L4/5;  Surgeon: Desean Dias MD;  Location: [facility] PAIN MGT;  Service: Pain Management;  Laterality: Bilateral;    HYSTERECTOMY      INJECTION OF ANESTHETIC AGENT AROUND MEDIAL BRANCH NERVES INNERVATING LUMBAR FACET JOINT Bilateral 1/31/2020    Procedure: Bilateral L3-5 MBB;  Surgeon: Desean Dias MD;  Location: [facility] PAIN MGT;  Service: Pain Management;  Laterality: Bilateral;    INJECTION OF ANESTHETIC AGENT INTO SACROILIAC JOINT Right 11/16/2021    Procedure: Right BLOCK, SACROILIAC JOINT Right GTB with RN IV sedation;  Surgeon: Yao Fulton MD;  Location: [facility] PAIN MGT;  Service: Pain Management;  Laterality: Right;    INJECTION OF JOINT Bilateral 7/9/2020    Procedure: Bilateral shoulder GH Joint injection with local;  Surgeon: Desean Dias MD;  Location:  [facility] PAIN MGT;  Service: Pain Management;  Laterality: Bilateral;    TRANSFORAMINAL EPIDURAL INJECTION OF STEROID Bilateral 2019    Procedure: Bilateral L3/4 Transforaminal Epidural Steroid Injection;  Surgeon: Desean Dias MD;  Location: [facility] PAIN MGT;  Service: Pain Management;  Laterality: Bilateral;    TRANSFORAMINAL EPIDURAL INJECTION OF STEROID Bilateral 3/10/2020    Procedure: Right T12/L1 TF XANDER with local;  Surgeon: Desean Dias MD;  Location: [facility] PAIN MGT;  Service: Pain Management;  Laterality: Bilateral;    TRANSFORAMINAL EPIDURAL INJECTION OF STEROID Right 2020    Procedure: Right T12/L1 TFESI Covid day of procedure;  Surgeon: Desean Dias MD;  Location: [facility] PAIN MGT;  Service: Pain Management;  Laterality: Right;     Family History   Problem Relation Age of Onset    No Known Problems Mother     No Known Problems Father      Social History     Socioeconomic History    Marital status: Single   Tobacco Use    Smoking status: Former Smoker     Packs/day: 1.00     Years: 10.00     Pack years: 10.00     Types: Cigarettes     Quit date: 2004     Years since quittin.8    Smokeless tobacco: Never Used   Substance and Sexual Activity    Alcohol use: No    Drug use: Never    Sexual activity: Not Currently   Social History Narrative    No pets or smokers in household.     Medication List with Changes/Refills   Current Medications    ACETAMINOPHEN (TYLENOL) 500 MG TABLET    Take 500 mg by mouth as needed for Pain.    ALBUTEROL (PROVENTIL/VENTOLIN HFA) 90 MCG/ACTUATION INHALER    INHALE 2 PUFFS INTO THE LUNGS EVERY 4 HOURS AS NEEDED    AMLODIPINE (NORVASC) 10 MG TABLET    Take 1 tablet (10 mg total) by mouth once daily.    ATORVASTATIN (LIPITOR) 40 MG TABLET    Take 1 tablet (40 mg total) by mouth once daily.    BREO ELLIPTA 200-25 MCG/DOSE DSDV DISKUS INHALER    INHALE 1 PUFF INTO THE LUNGS EVERY EVENING    CHOLECALCIFEROL, VITAMIN D3, 1,250 MCG (50,000 UNIT) CAPSULE     Take 1 capsule (50,000 Units total) by mouth every 7 days.    CLOPIDOGREL (PLAVIX) 75 MG TABLET    TAKE 1 TABLET BY MOUTH ONCE DAILY    DENOSUMAB (PROLIA) 60 MG/ML SYRG    every 6 (six) months.    DEXILANT 60 MG CAPSULE    Take 1 capsule by mouth once daily.    DICLOFENAC (VOLTAREN) 25 MG TBEC    TAKE 1 TABLET BY MOUTH TWICE DAILY    DILTIAZEM (CARDIZEM LA) 120 MG 24 HR TABLET    Take 1 tablet (120 mg total) by mouth once daily.    ESCITALOPRAM OXALATE (LEXAPRO) 20 MG TABLET    Take 1 tablet (20 mg total) by mouth once daily.    ESLICARBAZEPINE (APTIOM) 800 MG TAB    Take 1,200 mg by mouth every evening.    FLUTICASONE PROPIONATE (FLONASE) 50 MCG/ACTUATION NASAL SPRAY    SPRAY 1 SPRAY IN EACH NOSTRIL EVERY DAY    GABAPENTIN (NEURONTIN) 100 MG CAPSULE    Take 100 mg by mouth 2 (two) times daily as needed.    HYDROCHLOROTHIAZIDE (HYDRODIURIL) 25 MG TABLET    TAKE 1 TABLET BY MOUTH DAILY AS NEEDED    LATANOPROST 0.005 % OPHTHALMIC SOLUTION    Place 1 drop into both eyes every evening.    METFORMIN (GLUCOPHAGE) 500 MG TABLET    TAKE 1 TABLET BY MOUTH ONCE DAILY    METHYLPREDNISOLONE (MEDROL DOSEPACK) 4 MG TABLET    use as directed    METOPROLOL TARTRATE (LOPRESSOR) 50 MG TABLET    Take 1 tablet (50 mg total) by mouth 2 (two) times daily.    MIRABEGRON (MYRBETRIQ) 50 MG TB24    Take 1 tablet (50 mg total) by mouth once daily.    MONTELUKAST (SINGULAIR) 10 MG TABLET    TAKE 1 TABLET BY MOUTH ONCE DAILY    NITROGLYCERIN (NITROSTAT) 0.4 MG SL TABLET    DISSOLVE 1 TABLET UNDER TONGUE EVERY 5 MINUTES FOR CHEST PAIN (MAY TAKE UP TO 3 DOSES THEN SEEK MEDICAL ATTENTION)    OMEPRAZOLE (PRILOSEC) 40 MG CAPSULE    Take 1 capsule (40 mg total) by mouth every morning.    RANOLAZINE (RANEXA) 1,000 MG TB12    TAKE 1 TABLET BY MOUTH TWICE DAILY    ROFLUMILAST (DALIRESP) 500 MCG TAB    Take 1 tablet (500 mcg total) by mouth once daily.    SPIRIVA WITH HANDIHALER 18 MCG INHALATION CAPSULE    INHALE THE CONTENTS OF 1 CAPSULE VIA  HANDIHALER. INHALE AND HOLD BREATH THEN EXHALE AND REPEAT (2 INHALATIONS TOTAL). USE ONCE DAILY.    TIZANIDINE (ZANAFLEX) 4 MG TABLET    TAKE 1 TABLET BY MOUTH TWICE DAILY AS NEEDED    VALSARTAN (DIOVAN) 320 MG TABLET    Take 1 tablet (320 mg total) by mouth once daily.     Review of patient's allergies indicates:   Allergen Reactions    Penicillins Anaphylaxis, Hives, Shortness Of Breath and Swelling    Adhesive Rash    Betadine [povidone-iodine] Rash     Pt confirms has completed CT w/ IV contrast-iodine without reaction or need for pre-medication.    Doxycycline Nausea Only    Oxycodone Other (See Comments)     Fatigue      Sulfa (sulfonamide antibiotics) Itching    Zanaflex [tizanidine] Anxiety     Review of Systems   Constitutional: Negative for malaise/fatigue.   HENT: Positive for hearing loss.    Eyes: Negative for double vision and visual disturbance.   Cardiovascular: Positive for chest pain and syncope.   Respiratory: Negative for shortness of breath.    Endocrine: Negative for cold intolerance.   Hematologic/Lymphatic: Does not bruise/bleed easily.   Skin: Negative for poor wound healing and suspicious lesions.   Musculoskeletal: Positive for arthritis, back pain, falls, joint pain and joint swelling. Negative for gout.   Gastrointestinal: Positive for constipation and heartburn. Negative for nausea and vomiting.   Genitourinary: Positive for frequency. Negative for dysuria.   Neurological: Positive for numbness, paresthesias, seizures and sensory change.   Psychiatric/Behavioral: Positive for depression and substance abuse. Negative for memory loss. The patient has insomnia. The patient is not nervous/anxious.    Allergic/Immunologic: Negative for persistent infections.       Objective:   Body mass index is 23.52 kg/m².  Vitals:    02/16/22 0826   BP: 131/68   Pulse: 80                General    Constitutional: She is oriented to person, place, and time. She appears well-developed and  well-nourished. No distress.   HENT:   Head: Normocephalic.   Eyes: EOM are normal.   Pulmonary/Chest: Effort normal.   Neurological: She is oriented to person, place, and time.   Psychiatric: She has a normal mood and affect.             Right Hand/Wrist Exam     Inspection   Scars: Wrist - absent Hand -  absent  Effusion: Wrist - absent Hand -  absent    Pain   Wrist - The patient exhibits pain of the CMC.    Other     Neuorologic Exam    Median Distribution: normal  Ulnar Distribution: normal  Radial Distribution: normal    Comments:  The patient has tenderness right thumb basal joint with a positive axial circumduction grind test.  There are no motor or sensory deficits.      Left Hand/Wrist Exam     Inspection   Scars: Wrist - absent Hand -  absent  Effusion: Wrist - absent Hand -  absent    Pain   Wrist - The patient exhibits pain of the CMC.    Other     Sensory Exam  Median Distribution: normal  Ulnar Distribution: normal  Radial Distribution: normal    Comments:  Patient is tenderness left thumb basal joint with a positive axial circumduction grind test.  There are no motor or sensory deficits          Vascular Exam       Capillary Refill  Left Hand: normal capillary refill      Relevant imaging results reviewed and interpreted by me, discussed with the patient and / or family today radiographs of both hands showed bilateral basal joint arthritis  Assessment:     Encounter Diagnosis   Name Primary?    Arthritis of carpometacarpal (CMC) joint of both thumbs Yes        Plan:     The patient was injected elbow thumb basal joint each with 0.5 cc of Kenalog and 0.5 cc of 2% plain lidocaine.  She wait at least 3 months between injections.                Disclaimer: This note was prepared using a voice recognition system and is likely to have sound alike errors within the text.

## 2022-02-17 ENCOUNTER — TELEPHONE (OUTPATIENT)
Dept: RHEUMATOLOGY | Facility: CLINIC | Age: 77
End: 2022-02-17
Payer: MEDICARE

## 2022-02-17 NOTE — TELEPHONE ENCOUNTER
----- Message from Willie Ojeda sent at 2/17/2022  4:26 PM CST -----  Contact: self  Type:  Sooner Apoointment Request    Caller is requesting a sooner appointment.  Caller declined first available appointment listed below.  Caller will not accept being placed on the waitlist and is requesting a message be sent to doctor.  Name of Caller:Shireen Felix   When is the first available appointment?2022  Symptoms:est care  Would the patient rather a call back or a response via [facility]? Call back  Best Call Back Number:[phone]  Additional Information:

## 2022-02-18 DIAGNOSIS — F33.1 MODERATE RECURRENT MAJOR DEPRESSION: ICD-10-CM

## 2022-02-18 DIAGNOSIS — I25.118 CORONARY ARTERY DISEASE OF NATIVE ARTERY OF NATIVE HEART WITH STABLE ANGINA PECTORIS: ICD-10-CM

## 2022-02-19 NOTE — TELEPHONE ENCOUNTER
Care Due:                  Date            Visit Type   Department     Provider  --------------------------------------------------------------------------------                                EP -                              PRIMARY      [facility] INTERNAL  Last Visit: 11-      CARE ([facility])   [facility]       Laron Chaudhari                              EP -                              PRIMARY      [facility] INTERNAL  Next Visit: 05-      CARE ([facility])   [facility]                                                            Last  Test          Frequency    Reason                     Performed    Due Date  --------------------------------------------------------------------------------    HBA1C.......  6 months...  metFORMIN................  10-   04-    Powered by [employer]. Reference number: [id number].   2/18/2022 6:22:09 PM CST

## 2022-02-20 ENCOUNTER — NURSE TRIAGE (OUTPATIENT)
Dept: ADMINISTRATIVE | Facility: CLINIC | Age: 77
End: 2022-02-20
Payer: MEDICARE

## 2022-02-20 NOTE — TELEPHONE ENCOUNTER
[place] dx w/ Covid this AM [place] [facility] Reports initial symptoms of fever/chills and sinus drip. Has progressed to chest discomfort between shoulders and difficulty breathing. +Cough. Strongly encouraged to be evaluated in the ED. She verbalizes understanding. Closest ED is a non [facility] facility and she is very interested in receiving the antibody infusion. Would like follow up from PCP staff in this regard.    Reason for Disposition   MODERATE difficulty breathing (e.g., speaks in phrases, SOB even at rest, pulse 100-120)    Additional Information   Negative: SEVERE difficulty breathing (e.g., struggling for each breath, speaks in single words)   Negative: Difficult to awaken or acting confused (e.g., disoriented, slurred speech)   Negative: Bluish (or gray) lips or face now   Negative: Shock suspected (e.g., cold/pale/clammy skin, too weak to stand, low BP, rapid pulse)   Negative: Sounds like a life-threatening emergency to the triager    Protocols used: CORONAVIRUS (COVID-19) DIAGNOSED OR [id number]

## 2022-02-21 ENCOUNTER — NURSE TRIAGE (OUTPATIENT)
Dept: ADMINISTRATIVE | Facility: CLINIC | Age: 77
End: 2022-02-21
Payer: MEDICARE

## 2022-02-21 PROBLEM — U07.1 COVID: Status: ACTIVE | Noted: 2022-02-21

## 2022-02-21 RX ORDER — ATORVASTATIN CALCIUM 40 MG/1
TABLET, FILM COATED ORAL
Qty: 90 TABLET | Refills: 1 | Status: SHIPPED | OUTPATIENT
Start: 2022-02-21 | End: 2022-11-03

## 2022-02-21 RX ORDER — ESCITALOPRAM OXALATE 20 MG/1
TABLET ORAL
Qty: 90 TABLET | Refills: 1 | Status: SHIPPED | OUTPATIENT
Start: 2022-02-21 | End: 2022-07-28

## 2022-02-21 NOTE — TELEPHONE ENCOUNTER
Refilled per protocol    CHOLESTEROL (mg/dL)   Date Value   06/19/2019 239 (H)     Cholesterol (mg/dL)   Date Value   09/15/2020 229 (H)     HDL (mg/dL)   Date Value   09/15/2020 69   06/19/2019 70     CHOL/HDL (no units)   Date Value   06/19/2019 3.4     Cholesterol/ HDL Ratio (no units)   Date Value   09/15/2020 3.3     TRIGLYCERIDE (mg/dL)   Date Value   06/19/2019 143     Triglycerides (mg/dL)   Date Value   09/15/2020 76     CALCULATED LDL (mg/dL)   Date Value   06/19/2019 140 (H)     LDL (mg/dL)   Date Value   09/15/2020 145 (H)        This Rx Request does not qualify for assessment with the OR.    Please review protocol details and the Care Due Message for extra clinical information    Reasons Rx Request may be deferred:  1. Labs/Vitals overdue  2. Labs/Vitals abnormal  3. Patient has been seen in the ED/Hospital since the last PCP visit  4. Medication is non-delegated  5. Provider is a non-participating provider

## 2022-02-21 NOTE — TELEPHONE ENCOUNTER
Reason for Disposition   MODERATE difficulty breathing (e.g., speaks in phrases, SOB even at rest, pulse 100-120)    Additional Information   Negative: Severe difficulty breathing (e.g., struggling for each breath, speaks in single words)   Negative: Difficult to awaken or acting confused (e.g., disoriented, slurred speech)   Negative: Bluish (or gray) lips or face now   Negative: Shock suspected (e.g., cold/pale/clammy skin, too weak to stand, low BP, rapid pulse)   Negative: Sounds like a life-threatening emergency to the triager   Negative: [1] COVID-19 suspected (e.g., cough, fever, shortness of breath) AND [2] mild symptoms AND [3] public health department recommends testing   Negative: [1] COVID-19 exposure AND [2] no symptoms   Negative: COVID-19 and Breastfeeding, questions about   Negative: SEVERE or constant chest pain (Exception: mild central chest pain, present only when coughing)    Protocols used: CORONAVIRUS (COVID-19) - DIAGNOSED OR [id number]

## 2022-02-22 ENCOUNTER — TELEPHONE (OUTPATIENT)
Dept: INTERNAL MEDICINE | Facility: CLINIC | Age: 77
End: 2022-02-22
Payer: MEDICARE

## 2022-02-22 ENCOUNTER — INFUSION (OUTPATIENT)
Dept: INFECTIOUS DISEASES | Facility: HOSPITAL | Age: 77
End: 2022-02-22
Attending: INTERNAL MEDICINE
Payer: MEDICARE

## 2022-02-22 VITALS
RESPIRATION RATE: 16 BRPM | SYSTOLIC BLOOD PRESSURE: 134 MMHG | HEART RATE: 90 BPM | DIASTOLIC BLOOD PRESSURE: 61 MMHG | OXYGEN SATURATION: 100 % | TEMPERATURE: 99 F

## 2022-02-22 DIAGNOSIS — U07.1 COVID-19: ICD-10-CM

## 2022-02-22 PROCEDURE — 25000003 PHARM REV CODE 250: Mod: HCNC | Performed by: INTERNAL MEDICINE

## 2022-02-22 PROCEDURE — 63600175 PHARM REV CODE 636 W HCPCS: Mod: HCNC | Performed by: INTERNAL MEDICINE

## 2022-02-22 PROCEDURE — M0247 HC IV INFUSION, SOTROVIMAB, INCL POST ADMIN MONIT: HCPCS | Performed by: INTERNAL MEDICINE

## 2022-02-22 RX ORDER — EPINEPHRINE 0.3 MG/.3ML
0.3 INJECTION SUBCUTANEOUS
Status: ACTIVE | OUTPATIENT
Start: 2022-02-22 | End: 2022-03-01

## 2022-02-22 RX ORDER — ACETAMINOPHEN 325 MG/1
650 TABLET ORAL ONCE AS NEEDED
Status: DISCONTINUED | OUTPATIENT
Start: 2022-02-22 | End: 2023-02-01

## 2022-02-22 RX ORDER — SODIUM CHLORIDE 0.9 % (FLUSH) 0.9 %
10 SYRINGE (ML) INJECTION
Status: ACTIVE | OUTPATIENT
Start: 2022-02-22 | End: 2022-03-01

## 2022-02-22 RX ORDER — ONDANSETRON 4 MG/1
4 TABLET, ORALLY DISINTEGRATING ORAL
Status: ACTIVE | OUTPATIENT
Start: 2022-02-22 | End: 2022-03-01

## 2022-02-22 RX ORDER — DIPHENHYDRAMINE HYDROCHLORIDE 50 MG/ML
25 INJECTION INTRAMUSCULAR; INTRAVENOUS
Status: ACTIVE | OUTPATIENT
Start: 2022-02-22 | End: 2022-03-01

## 2022-02-22 RX ORDER — ALBUTEROL SULFATE 90 UG/1
2 AEROSOL, METERED RESPIRATORY (INHALATION)
Status: ACTIVE | OUTPATIENT
Start: 2022-02-22 | End: 2022-03-01

## 2022-02-22 RX ADMIN — SODIUM CHLORIDE 500 MG: 9 INJECTION, SOLUTION INTRAVENOUS at 01:02

## 2022-02-22 NOTE — PROGRESS NOTES
If you have any further COVID related symptoms that have not improved or feel you're having a reaction to your infusion please notify your primary care physician, go to the nearest emergency room or call 911. Patient discharged via wheelchair with escort to front door of hospital in no apparent distress. Tolerated infusion well. Instructed patient to notify primary care physician if symptoms do not resolve or worsen and to continue to quarantine for the full 10 day period. Also, instructed to wait 90 days post-infusion to receive Covid vaccine. Patient verbalized intent to comply.

## 2022-02-22 NOTE — TELEPHONE ENCOUNTER
----- Message from Olive Mallory sent at 2/21/2022  9:09 AM CST -----  Type: Patient Call Back         Who called:Patient          What is the request in detail:Patient called in regarding a few questions and concerns .         Can the clinic reply by [facility]?no          Would the patient rather a call back or a response via [facility]? Call back          Best call back number: (mobile) [phone]         Additional Information:           Thank You

## 2022-02-22 NOTE — TELEPHONE ENCOUNTER
----- Message from Bennett Talamantes sent at 2/21/2022 10:28 AM CST -----  Pt would like return call; pt states she is needing referral for Covid infusion.  Please call back at .[phone].   Lazaro morris Md

## 2022-02-22 NOTE — TELEPHONE ENCOUNTER
Called and spoke with pt, pt states she tested positive for covid and has appointment today for infusion. Pt was informed if she continues to have S.O.B go to ED. Pt verbalized understanding and concern for having to wait for a response pt called on yesterday about the positive results.  ALEXANDER

## 2022-02-22 NOTE — TELEPHONE ENCOUNTER
----- Message from Misty Brady sent at 2022 10:08 AM CST -----  Regardinnd message  Contact: [phone]  Patient called, stated that she called early and left a message in regards the infusion orders. Patient also stated that she contact an emergency on call nurse last night.  Please call and advise. thank you

## 2022-02-22 NOTE — TELEPHONE ENCOUNTER
----- Message from Raymond Michelle sent at 2/21/2022  8:08 AM CST -----  Contact: self/ [phone]  Patient would like to consult with a nurse in regards to covid symptoms. Please call back at  [phone]. Thanks r/s

## 2022-02-22 NOTE — TELEPHONE ENCOUNTER
----- Message from Kenneth Rich sent at 2/21/2022  1:00 PM CST -----  Contact: [phone]  Patient is calling in requesting a call back for infusions please call her back she tested positive on Sunday. Please call her back at [phone]  Thanks/ln

## 2022-02-22 NOTE — TELEPHONE ENCOUNTER
Called and spoke with pt who stated she tested positive at urgent care. Pt is have SOB. pt is schedule to have BAM today.  Bn

## 2022-02-22 NOTE — TELEPHONE ENCOUNTER
Duplicate call   Pt called and spoke with on today 02/22/22 pt has BAM appointment in place.  Pt informed pcp was not in clinic today or yesterday.  ALEXANDER

## 2022-02-23 ENCOUNTER — TELEPHONE (OUTPATIENT)
Dept: INTERNAL MEDICINE | Facility: CLINIC | Age: 77
End: 2022-02-23
Payer: MEDICARE

## 2022-02-23 NOTE — TELEPHONE ENCOUNTER
----- Message from Radha Diallo sent at 2/23/2022  2:56 PM CST -----  Contact: Patient, [phone]  Calling because she tested positive for Covid 19, she is feeling really bad. Please call her. Thanks.

## 2022-02-23 NOTE — TELEPHONE ENCOUNTER
S/w pt audible wheezing, pt reporting ox sates dropped 80s day before rec'vd infusion yesterday 02/22/22, pt confirmed taking using flonase as prescribed, and also to use home cool mist humidifier as directed. Pt reports ox sats today in 90s, advised pt d/t audible s/s and ox sats w/ pt's h/o pneumonia pt needs to be taken to nearest ER for eval including cxr. Pt vu, s/w brother also confirming ER instrc given and to CB after pt seen at ER/hosp.

## 2022-03-03 ENCOUNTER — TELEPHONE (OUTPATIENT)
Dept: INTERNAL MEDICINE | Facility: CLINIC | Age: 77
End: 2022-03-03
Payer: MEDICARE

## 2022-03-03 DIAGNOSIS — J41.1 MUCOPURULENT CHRONIC BRONCHITIS: Primary | Chronic | ICD-10-CM

## 2022-03-03 RX ORDER — METHYLPREDNISOLONE 4 MG/1
TABLET ORAL
Qty: 1 EACH | Refills: 0 | Status: SHIPPED | OUTPATIENT
Start: 2022-03-03 | End: 2022-03-15

## 2022-03-03 NOTE — TELEPHONE ENCOUNTER
Pt is coughing up mucus but is having heavy feeling in chest and feels like mucus is not coming up. Pt brother is asking she have something sent in to help her.  BN

## 2022-03-03 NOTE — TELEPHONE ENCOUNTER
Patient is post covid with know hx copd. Medrol dose pack reset, monitor BS, Consult for pulmonary placed. Get patient in asap. Verify she is taking her spiriva, roflumilast, singulair, breo, and albuterol.

## 2022-03-03 NOTE — TELEPHONE ENCOUNTER
----- Message from Archana Gibson sent at 3/3/2022  8:20 AM CST -----  Regarding: infection  Contact: Patient's brother- Rm  Please call  patient's brother concerning patient possibly having infection in her lungs. Please call patient's brother at Ph .[phone] ( Rm)

## 2022-03-09 ENCOUNTER — HOSPITAL ENCOUNTER (OUTPATIENT)
Dept: RADIOLOGY | Facility: HOSPITAL | Age: 77
Discharge: HOME | End: 2022-03-09
Attending: PODIATRIST
Payer: MEDICARE

## 2022-03-09 ENCOUNTER — OFFICE VISIT (OUTPATIENT)
Dept: PAIN MEDICINE | Facility: CLINIC | Age: 77
End: 2022-03-09
Payer: MEDICARE

## 2022-03-09 VITALS
SYSTOLIC BLOOD PRESSURE: 162 MMHG | HEIGHT: 64 IN | WEIGHT: 136.69 LBS | HEART RATE: 85 BPM | DIASTOLIC BLOOD PRESSURE: 79 MMHG | BODY MASS INDEX: 23.34 KG/M2

## 2022-03-09 DIAGNOSIS — M47.816 LUMBAR FACET ARTHROPATHY: ICD-10-CM

## 2022-03-09 DIAGNOSIS — M80.00XD AGE-RELATED OSTEOPOROSIS WITH CURRENT PATHOLOGICAL FRACTURE WITH ROUTINE HEALING, SUBSEQUENT ENCOUNTER: ICD-10-CM

## 2022-03-09 DIAGNOSIS — M53.3 SACROILIAC JOINT DYSFUNCTION: ICD-10-CM

## 2022-03-09 DIAGNOSIS — M79.18 MYOFASCIAL MUSCLE PAIN: Primary | ICD-10-CM

## 2022-03-09 DIAGNOSIS — M47.816 LUMBAR SPONDYLOSIS: ICD-10-CM

## 2022-03-09 DIAGNOSIS — M79.672 LEFT FOOT PAIN: ICD-10-CM

## 2022-03-09 PROCEDURE — 3077F PR MOST RECENT SYSTOLIC BLOOD PRESSURE >= 140 MM HG: ICD-10-PCS | Mod: CPTII,S$GLB,, | Performed by: ANESTHESIOLOGY

## 2022-03-09 PROCEDURE — 1100F PR PT FALLS ASSESS DOC 2+ FALLS/FALL W/INJURY/YR: ICD-10-PCS | Mod: CPTII,S$GLB,, | Performed by: ANESTHESIOLOGY

## 2022-03-09 PROCEDURE — 3072F LOW RISK FOR RETINOPATHY: CPT | Mod: CPTII,S$GLB,, | Performed by: ANESTHESIOLOGY

## 2022-03-09 PROCEDURE — 1159F PR MEDICATION LIST DOCUMENTED IN MEDICAL RECORD: ICD-10-PCS | Mod: CPTII,S$GLB,, | Performed by: ANESTHESIOLOGY

## 2022-03-09 PROCEDURE — 73700 CT LOWER EXTREMITY W/O DYE: CPT | Mod: TC,LT

## 2022-03-09 PROCEDURE — 73700 CT FOOT WITHOUT CONTRAST LEFT: ICD-10-PCS | Mod: 26,LT,, | Performed by: RADIOLOGY

## 2022-03-09 PROCEDURE — 20552 NJX 1/MLT TRIGGER POINT 1/2: CPT | Mod: S$GLB,,, | Performed by: ANESTHESIOLOGY

## 2022-03-09 PROCEDURE — 3078F PR MOST RECENT DIASTOLIC BLOOD PRESSURE < 80 MM HG: ICD-10-PCS | Mod: CPTII,S$GLB,, | Performed by: ANESTHESIOLOGY

## 2022-03-09 PROCEDURE — 3072F PR LOW RISK FOR RETINOPATHY: ICD-10-PCS | Mod: CPTII,S$GLB,, | Performed by: ANESTHESIOLOGY

## 2022-03-09 PROCEDURE — 1159F MED LIST DOCD IN RCRD: CPT | Mod: CPTII,S$GLB,, | Performed by: ANESTHESIOLOGY

## 2022-03-09 PROCEDURE — 1125F PR PAIN SEVERITY QUANTIFIED, PAIN PRESENT: ICD-10-PCS | Mod: CPTII,S$GLB,, | Performed by: ANESTHESIOLOGY

## 2022-03-09 PROCEDURE — 99214 OFFICE O/P EST MOD 30 MIN: CPT | Mod: 25,S$GLB,, | Performed by: ANESTHESIOLOGY

## 2022-03-09 PROCEDURE — 3078F DIAST BP <80 MM HG: CPT | Mod: CPTII,S$GLB,, | Performed by: ANESTHESIOLOGY

## 2022-03-09 PROCEDURE — 99214 PR OFFICE/OUTPT VISIT, EST, LEVL IV, 30-39 MIN: ICD-10-PCS | Mod: 25,S$GLB,, | Performed by: ANESTHESIOLOGY

## 2022-03-09 PROCEDURE — 99999 PR PBB SHADOW E&M-EST. PATIENT-LVL IV: CPT | Mod: PBBFAC,,, | Performed by: ANESTHESIOLOGY

## 2022-03-09 PROCEDURE — 1125F AMNT PAIN NOTED PAIN PRSNT: CPT | Mod: CPTII,S$GLB,, | Performed by: ANESTHESIOLOGY

## 2022-03-09 PROCEDURE — 99999 PR PBB SHADOW E&M-EST. PATIENT-LVL IV: ICD-10-PCS | Mod: PBBFAC,,, | Performed by: ANESTHESIOLOGY

## 2022-03-09 PROCEDURE — 3288F PR FALLS RISK ASSESSMENT DOCUMENTED: ICD-10-PCS | Mod: CPTII,S$GLB,, | Performed by: ANESTHESIOLOGY

## 2022-03-09 PROCEDURE — 3288F FALL RISK ASSESSMENT DOCD: CPT | Mod: CPTII,S$GLB,, | Performed by: ANESTHESIOLOGY

## 2022-03-09 PROCEDURE — 1100F PTFALLS ASSESS-DOCD GE2>/YR: CPT | Mod: CPTII,S$GLB,, | Performed by: ANESTHESIOLOGY

## 2022-03-09 PROCEDURE — 20552 PR INJECT TRIGGER POINT, 1 OR 2: ICD-10-PCS | Mod: S$GLB,,, | Performed by: ANESTHESIOLOGY

## 2022-03-09 PROCEDURE — 1160F PR REVIEW ALL MEDS BY PRESCRIBER/CLIN PHARMACIST DOCUMENTED: ICD-10-PCS | Mod: CPTII,S$GLB,, | Performed by: ANESTHESIOLOGY

## 2022-03-09 PROCEDURE — 1160F RVW MEDS BY RX/DR IN RCRD: CPT | Mod: CPTII,S$GLB,, | Performed by: ANESTHESIOLOGY

## 2022-03-09 PROCEDURE — 73700 CT LOWER EXTREMITY W/O DYE: CPT | Mod: 26,LT,, | Performed by: RADIOLOGY

## 2022-03-09 PROCEDURE — 3077F SYST BP >= 140 MM HG: CPT | Mod: CPTII,S$GLB,, | Performed by: ANESTHESIOLOGY

## 2022-03-09 RX ORDER — METHYLPREDNISOLONE ACETATE 40 MG/ML
40 INJECTION, SUSPENSION INTRA-ARTICULAR; INTRALESIONAL; INTRAMUSCULAR; SOFT TISSUE
Status: COMPLETED | OUTPATIENT
Start: 2022-03-09 | End: 2022-03-09

## 2022-03-09 RX ADMIN — METHYLPREDNISOLONE ACETATE 40 MG: 40 INJECTION, SUSPENSION INTRA-ARTICULAR; INTRALESIONAL; INTRAMUSCULAR; SOFT TISSUE at 10:03

## 2022-03-11 ENCOUNTER — TELEPHONE (OUTPATIENT)
Dept: PAIN MEDICINE | Facility: CLINIC | Age: 77
End: 2022-03-11
Payer: MEDICARE

## 2022-03-11 ENCOUNTER — TELEPHONE (OUTPATIENT)
Dept: PODIATRY | Facility: CLINIC | Age: 77
End: 2022-03-11
Payer: MEDICARE

## 2022-03-11 NOTE — TELEPHONE ENCOUNTER
----- Message from Obinna Kaminski sent at 3/11/2022 10:34 AM CST -----  Contact: Pt [phone]  She said Dr. Mckeon mentioned a new medicine for her to try for pain related to shingles but, she doesn't remember the name of if. He was supposed to have forwarded this to you.    Thank you

## 2022-03-11 NOTE — TELEPHONE ENCOUNTER
Attempted to call patient regards most recent CT scan of the right foot.  No acute fracture dislocation is noted present.  Significant arthritis is noted.

## 2022-03-11 NOTE — TELEPHONE ENCOUNTER
Unable to contact patient and LVM.     ----- Message from Lizy Caballero sent at 3/10/2022  2:50 PM CST -----  Contact: Shireen Iyer called regarding the appointment that needs to be rescheduled, she wanted to know if you were calling her first, please give her a call back at [phone]      Thanks  kb

## 2022-03-14 ENCOUNTER — TELEPHONE (OUTPATIENT)
Dept: PODIATRY | Facility: CLINIC | Age: 77
End: 2022-03-14
Payer: MEDICARE

## 2022-03-14 DIAGNOSIS — J45.909 ASTHMA, UNSPECIFIED ASTHMA SEVERITY, UNSPECIFIED WHETHER COMPLICATED, UNSPECIFIED WHETHER PERSISTENT: Primary | ICD-10-CM

## 2022-03-15 ENCOUNTER — HOSPITAL ENCOUNTER (OUTPATIENT)
Dept: RADIOLOGY | Facility: HOSPITAL | Age: 77
Discharge: HOME | End: 2022-03-15
Attending: NURSE PRACTITIONER
Payer: MEDICARE

## 2022-03-15 ENCOUNTER — OFFICE VISIT (OUTPATIENT)
Dept: PULMONOLOGY | Facility: CLINIC | Age: 77
End: 2022-03-15
Payer: MEDICARE

## 2022-03-15 VITALS
HEIGHT: 64 IN | SYSTOLIC BLOOD PRESSURE: 134 MMHG | RESPIRATION RATE: 16 BRPM | DIASTOLIC BLOOD PRESSURE: 82 MMHG | OXYGEN SATURATION: 99 % | WEIGHT: 136.88 LBS | BODY MASS INDEX: 23.37 KG/M2 | HEART RATE: 86 BPM

## 2022-03-15 DIAGNOSIS — J41.1 MUCOPURULENT CHRONIC BRONCHITIS: Chronic | ICD-10-CM

## 2022-03-15 DIAGNOSIS — M54.6 THORACIC SPINE PAIN: ICD-10-CM

## 2022-03-15 DIAGNOSIS — J45.41 MODERATE PERSISTENT ASTHMATIC BRONCHITIS WITH ACUTE EXACERBATION: Primary | ICD-10-CM

## 2022-03-15 DIAGNOSIS — M46.1 SACROILIITIS, NOT ELSEWHERE CLASSIFIED: ICD-10-CM

## 2022-03-15 DIAGNOSIS — J45.909 ASTHMA, UNSPECIFIED ASTHMA SEVERITY, UNSPECIFIED WHETHER COMPLICATED, UNSPECIFIED WHETHER PERSISTENT: ICD-10-CM

## 2022-03-15 DIAGNOSIS — J30.89 NON-SEASONAL ALLERGIC RHINITIS DUE TO OTHER ALLERGIC TRIGGER: ICD-10-CM

## 2022-03-15 DIAGNOSIS — I50.32 CHRONIC DIASTOLIC HEART FAILURE: ICD-10-CM

## 2022-03-15 DIAGNOSIS — J45.40 MODERATE PERSISTENT ASTHMA WITHOUT COMPLICATION: ICD-10-CM

## 2022-03-15 PROCEDURE — 3288F PR FALLS RISK ASSESSMENT DOCUMENTED: ICD-10-PCS | Mod: CPTII,S$GLB,, | Performed by: NURSE PRACTITIONER

## 2022-03-15 PROCEDURE — 71046 X-RAY EXAM CHEST 2 VIEWS: CPT | Mod: TC

## 2022-03-15 PROCEDURE — 3288F FALL RISK ASSESSMENT DOCD: CPT | Mod: CPTII,S$GLB,, | Performed by: NURSE PRACTITIONER

## 2022-03-15 PROCEDURE — 1160F PR REVIEW ALL MEDS BY PRESCRIBER/CLIN PHARMACIST DOCUMENTED: ICD-10-PCS | Mod: CPTII,S$GLB,, | Performed by: NURSE PRACTITIONER

## 2022-03-15 PROCEDURE — 99215 OFFICE O/P EST HI 40 MIN: CPT | Mod: S$GLB,,, | Performed by: NURSE PRACTITIONER

## 2022-03-15 PROCEDURE — 1160F RVW MEDS BY RX/DR IN RCRD: CPT | Mod: CPTII,S$GLB,, | Performed by: NURSE PRACTITIONER

## 2022-03-15 PROCEDURE — 99999 PR PBB SHADOW E&M-EST. PATIENT-LVL IV: CPT | Mod: PBBFAC,,, | Performed by: NURSE PRACTITIONER

## 2022-03-15 PROCEDURE — 3075F SYST BP GE 130 - 139MM HG: CPT | Mod: CPTII,S$GLB,, | Performed by: NURSE PRACTITIONER

## 2022-03-15 PROCEDURE — 3079F DIAST BP 80-89 MM HG: CPT | Mod: CPTII,S$GLB,, | Performed by: NURSE PRACTITIONER

## 2022-03-15 PROCEDURE — 1159F PR MEDICATION LIST DOCUMENTED IN MEDICAL RECORD: ICD-10-PCS | Mod: CPTII,S$GLB,, | Performed by: NURSE PRACTITIONER

## 2022-03-15 PROCEDURE — 3072F LOW RISK FOR RETINOPATHY: CPT | Mod: CPTII,S$GLB,, | Performed by: NURSE PRACTITIONER

## 2022-03-15 PROCEDURE — 1101F PT FALLS ASSESS-DOCD LE1/YR: CPT | Mod: CPTII,S$GLB,, | Performed by: NURSE PRACTITIONER

## 2022-03-15 PROCEDURE — 99215 PR OFFICE/OUTPT VISIT, EST, LEVL V, 40-54 MIN: ICD-10-PCS | Mod: S$GLB,,, | Performed by: NURSE PRACTITIONER

## 2022-03-15 PROCEDURE — 1159F MED LIST DOCD IN RCRD: CPT | Mod: CPTII,S$GLB,, | Performed by: NURSE PRACTITIONER

## 2022-03-15 PROCEDURE — 72070 X-RAY EXAM THORAC SPINE 2VWS: CPT | Mod: TC

## 2022-03-15 PROCEDURE — 99999 PR PBB SHADOW E&M-EST. PATIENT-LVL IV: ICD-10-PCS | Mod: PBBFAC,,, | Performed by: NURSE PRACTITIONER

## 2022-03-15 PROCEDURE — 3075F PR MOST RECENT SYSTOLIC BLOOD PRESS GE 130-139MM HG: ICD-10-PCS | Mod: CPTII,S$GLB,, | Performed by: NURSE PRACTITIONER

## 2022-03-15 PROCEDURE — 1101F PR PT FALLS ASSESS DOC 0-1 FALLS W/OUT INJ PAST YR: ICD-10-PCS | Mod: CPTII,S$GLB,, | Performed by: NURSE PRACTITIONER

## 2022-03-15 PROCEDURE — 3072F PR LOW RISK FOR RETINOPATHY: ICD-10-PCS | Mod: CPTII,S$GLB,, | Performed by: NURSE PRACTITIONER

## 2022-03-15 PROCEDURE — 71046 X-RAY EXAM CHEST 2 VIEWS: CPT | Mod: 26,,, | Performed by: RADIOLOGY

## 2022-03-15 PROCEDURE — 72070 XR THORACIC SPINE AP LATERAL: ICD-10-PCS | Mod: 26,,, | Performed by: RADIOLOGY

## 2022-03-15 PROCEDURE — 3079F PR MOST RECENT DIASTOLIC BLOOD PRESSURE 80-89 MM HG: ICD-10-PCS | Mod: CPTII,S$GLB,, | Performed by: NURSE PRACTITIONER

## 2022-03-15 PROCEDURE — 72070 X-RAY EXAM THORAC SPINE 2VWS: CPT | Mod: 26,,, | Performed by: RADIOLOGY

## 2022-03-15 PROCEDURE — 71046 XR CHEST PA AND LATERAL: ICD-10-PCS | Mod: 26,,, | Performed by: RADIOLOGY

## 2022-03-15 RX ORDER — AZITHROMYCIN 250 MG/1
TABLET, FILM COATED ORAL
Qty: 6 TABLET | Refills: 0 | Status: SHIPPED | OUTPATIENT
Start: 2022-03-15 | End: 2022-08-26

## 2022-03-15 RX ORDER — FLUTICASONE FUROATE, UMECLIDINIUM BROMIDE AND VILANTEROL TRIFENATATE 200; 62.5; 25 UG/1; UG/1; UG/1
1 POWDER RESPIRATORY (INHALATION) DAILY
Qty: 60 EACH | Refills: 11 | Status: SHIPPED | OUTPATIENT
Start: 2022-03-15 | End: 2023-01-18

## 2022-03-15 RX ORDER — IPRATROPIUM BROMIDE 42 UG/1
2 SPRAY, METERED NASAL 4 TIMES DAILY
Qty: 15 ML | Refills: 11 | Status: SHIPPED | OUTPATIENT
Start: 2022-03-15 | End: 2023-01-18

## 2022-03-15 RX ORDER — ALBUTEROL SULFATE 0.83 MG/ML
2.5 SOLUTION RESPIRATORY (INHALATION) EVERY 4 HOURS PRN
COMMUNITY
End: 2022-08-26

## 2022-03-15 RX ORDER — ALBUTEROL SULFATE 0.83 MG/ML
2.5 SOLUTION RESPIRATORY (INHALATION) EVERY 4 HOURS PRN
Qty: 150 ML | Refills: 11 | Status: SHIPPED | OUTPATIENT
Start: 2022-03-15 | End: 2023-03-15

## 2022-03-15 RX ORDER — PREDNISONE 20 MG/1
TABLET ORAL
Qty: 20 TABLET | Refills: 0 | Status: SHIPPED | OUTPATIENT
Start: 2022-03-15 | End: 2022-04-12 | Stop reason: SDUPTHER

## 2022-03-15 RX ORDER — FLUTICASONE PROPIONATE 50 MCG
2 SPRAY, SUSPENSION (ML) NASAL DAILY
Qty: 16 G | Refills: 11 | Status: SHIPPED | OUTPATIENT
Start: 2022-03-15 | End: 2023-01-18

## 2022-03-15 NOTE — PROGRESS NOTES
Subjective:      Established patient    Patient ID: Shireen Felix is a 76 y.o. female.  Patient Active Problem List   Diagnosis    Nasal septal perforation    Postherpetic neuralgia    Conductive hearing loss, unilateral    Chronic otitis media    Chronic allergic rhinitis    Coronary artery disease    Elevated IgE level    GERD (gastroesophageal reflux disease)    History of cerebral aneurysm repair    History of tobacco abuse    HTN (hypertension)    Iron deficiency anemia    Spinal stenosis of lumbar region with neurogenic claudication    Mucopurulent chronic bronchitis    UIP (usual interstitial pneumonitis)    Recurrent falls    DM (diabetes mellitus) type II controlled with renal manifestation    CAD in native artery    Neuroforaminal stenosis of lumbar spine    Lumbar radiculopathy    History of stroke    Hypertension associated with diabetes    Normocytic anemia    Debility    Osteoarthritis of left wrist    Chronic back pain    Chronic pain syndrome    Decreased GFR    Age-related osteoporosis without current pathological fracture    Shoulder arthritis    Spontaneous ecchymosis    Bilateral carpal tunnel syndrome    Cervical radiculopathy    Cubital tunnel syndrome on right    Abnormal chest x-ray    Depression    Hyperglycemia due to type 2 diabetes mellitus    Imbalance    Long term current use of insulin    Mild protein-calorie malnutrition    Mixed hyperlipidemia    Moderate recurrent major depression    Orthostatic hypotension    Other constipation    Overactive bladder    Perforation of right tympanic membrane    Primary insomnia    Thrush, oral    Vitamin D deficiency    Polypharmacy    Multiple neurological symptoms    DDD (degenerative disc disease), thoracolumbar    DDD (degenerative disc disease), thoracic    DDD (degenerative disc disease), lumbosacral    DDD (degenerative disc disease), lumbar    DDD (degenerative disc disease),  cervical    History of cerebral aneurysm    Hx of craniotomy    Temporal lobe epilepsy    Syncope    Spinal stenosis    Fluctuating blood pressure    COVID    Sacroiliitis, not elsewhere classified    Chronic diastolic heart failure       Problem list has been reviewed.    Chief Complaint: Asthma, Usual Interstitial pneumonitis, and Bronchitis      HPI     Shireen Felix is a 76 y.o. female presents for follow up on chronic UIP, asthma with chronic bronchitis.     Former patient Dr. Singletno, last seen 9/23/2020.    Patients reports stable NYHA II dyspnea    Since having covid 19 + on 2/24/2022. Recently tested covid negative 3/7/2022.     The patient has currently have symptoms, she states doing well with regard to lungs up until +covid 2/24/2022.      She reports daily productive cough clear and yellow. + wheeze intermittent. Denies hemoptysis.    Her current respiratory therapy regimen is VENTOLIN, albuterol nebs, BREO 200 mcg. Spiriva handihaler. Daliresp.  She is  adherent with her regimen.       Asthma Control Test  In the past 4  weeks, how much of the time did your asthma keep you from getting as much done at work, school or at home?: Most of the time  During the past 4 weeks, how often have you had shortness of breath?: More than once a day  During the past 4 weeks, how often did your asthma symptoms (wheezing, couging, shortness of breath, chest tightness or pain) wake you up at night or earlier than usual in the morning?: Not at all  During the past 4 weeks, how often have you used your rescue inhaler or nebulizer medication (such as albuterol)?: 3 or more times per day  How would you rate your asthma control during the past 4 weeks?: Poorly controlled  If your score is 19 or less, your asthma may not be under control: 11     Immunization status reviewed and up to date.       A full  review of systems, past , family  and social histories was performed except as mentioned in the note above,  these are non contributory to the main issues discussed today.     Previous Report Reviewed: lab reports, office notes and radiology reports     The following portions of the patient's history were reviewed and updated as appropriate: She  has a past medical history of Abnormal ECG (8/5/2019), Aneurysm, Anticoagulant long-term use, Arthritis, CAD in native artery (8/5/2019), COPD (chronic obstructive pulmonary disease), Diabetes mellitus, Fall (10/10/2019), Glaucoma, Hypertension, Seizures, Shingles (05/27/2017), and Stroke.  She  has a past surgical history that includes Brain surgery; Hysterectomy; Transforaminal epidural injection of steroid (Bilateral, 7/23/2019); Cardiac catheterization; Coronary angioplasty; Epidural steroid injection into lumbar spine (Bilateral, 11/12/2019); Injection of anesthetic agent around medial branch nerves innervating lumbar facet joint (Bilateral, 1/31/2020); Transforaminal epidural injection of steroid (Bilateral, 3/10/2020); Transforaminal epidural injection of steroid (Right, 6/19/2020); Injection of joint (Bilateral, 7/9/2020); and Injection of anesthetic agent into sacroiliac joint (Right, 11/16/2021).  Her family history includes No Known Problems in her father and mother.  She  reports that she quit smoking about 17 years ago. Her smoking use included cigarettes. She has a 10.00 pack-year smoking history. She has never used smokeless tobacco. She reports that she does not drink alcohol and does not use drugs.  She has a current medication list which includes the following prescription(s): acetaminophen, albuterol, albuterol, amlodipine, aptiom, aptiom, atorvastatin, cholecalciferol (vitamin d3), clopidogrel, denosumab, dexilant, diclofenac, diltiazem, escitalopram oxalate, fluticasone propionate, gabapentin, hydrochlorothiazide, latanoprost, metformin, metoprolol tartrate, metoprolol tartrate, myrbetriq, montelukast, nitroglycerin, omeprazole, ranolazine, daliresp,  "tizanidine, valsartan, albuterol, azithromycin, trelegy ellipta, ipratropium, and prednisone, and the following Facility-Administered Medications: acetaminophen.  She is allergic to penicillins, adhesive, betadine [povidone-iodine], doxycycline, oxycodone, sulfa (sulfonamide antibiotics), and zanaflex [tizanidine]..    Review of Systems   Constitutional: Negative for fever and chills.   HENT: Positive for rhinorrhea, congestion and hearing loss. Negative for nosebleeds.    Eyes: Negative for redness.   Respiratory: Positive for cough, sputum production, wheezing, dyspnea on extertion and use of rescue inhaler. Negative for choking.    Genitourinary: Negative for hematuria.   Endocrine: Diabetes melllitus Negative for cold intolerance.    Musculoskeletal: Positive for arthralgias and back pain.   Skin: Negative for rash.   Gastrointestinal: Positive for acid reflux. Negative for vomiting.   Neurological: Negative for syncope and headaches.        History of stroke  Seizure disorder.  Post herpetic neuralgia   Hematological: Negative for adenopathy. Bleeds easily and excessive bruising.   Psychiatric/Behavioral: Negative for confusion.        Objective:     /82   Pulse 86   Resp 16   Ht 5' 4" (1.626 m)   Wt 62.1 kg (136 lb 14.5 oz)   LMP  (LMP Unknown)   SpO2 99%   BMI 23.50 kg/m²   Body mass index is 23.5 kg/m².     Physical Exam  Constitutional:       Appearance: She is well-developed.   HENT:      Head: Normocephalic and atraumatic.      Right Ear: Tympanic membrane normal.      Left Ear: Tympanic membrane normal.   Eyes:      Pupils: Pupils are equal, round, and reactive to light.   Cardiovascular:      Rate and Rhythm: Normal rate and regular rhythm.      Heart sounds: Normal heart sounds. No murmur heard.  Pulmonary:      Effort: Pulmonary effort is normal. No respiratory distress.   Abdominal:      General: Bowel sounds are normal.      Palpations: Abdomen is soft.   Musculoskeletal:         " General: No tenderness. Normal range of motion.      Cervical back: Normal range of motion and neck supple.   Skin:     General: Skin is warm and dry.      Coloration: Skin is not pale.   Neurological:      Mental Status: She is alert and oriented to person, place, and time.   Psychiatric:         Behavior: Behavior normal.         Personal Diagnostic Review      X-Ray Thoracic Spine AP Lateral  Narrative: EXAMINATION:  XR THORACIC SPINE AP LATERAL    CLINICAL HISTORY:  upper thoracic spine pain r/o compression fracture. Fall Jan 2022.;  Pain in thoracic spine    TECHNIQUE:  AP and lateral views were obtained of the thoracic spine.    COMPARISON:  CT dated 03/19/2021    FINDINGS:  There is slight rightward convexity of the thoracic spine.  There are chronic compression deformities involving the superior endplates of T12 and L2 which appear unchanged from prior CT.  No acute fractures or subluxations demonstrated.  Thoracic intervertebral disc heights appear preserved.  Multilevel degenerative disc disease is seen in the visualized cervical spine.  Visualized osseous structures appear diffusely osteopenic.  Impression: As above.  No acute findings.    Electronically signed by: Neri Husain MD  Date:    03/15/2022  Time:    12:34  X-Ray Chest PA And Lateral  Narrative: EXAMINATION:  XR CHEST PA AND LATERAL    CLINICAL HISTORY:  Unspecified asthma, uncomplicated    TECHNIQUE:  PA and lateral views of the chest were performed.    COMPARISON:  08/17/2021    FINDINGS:  The cardiac and mediastinal silhouettes appear within normal limits.   Faint chronic appearing interstitial opacities seen in the periphery of the lungs bilaterally with no appreciable change from prior.  No focal parenchymal consolidation or definite pleural effusion visualized.  No acute osseous findings demonstrated.  Impression: Unchanged appearance of the chest as above.  No acute findings    Electronically signed by: Neri Husain  MD  Date:    03/15/2022  Time:    11:24        XR CHEST PA AND LATERAL: 20:      Coarsening of the bronchovascular markings again noted.  No confluent airspace disease.  No pleural effusion.  Cardiomediastinal silhouette and osseous structures are stable in appearance.    Pulmonary function tests:20 :  FEV1: 2.13 (103.5 % predicted), FVC:  2.44 (91.0 % predicted), FEV1/FVC:  87.75, T.47 ( 90.1 % predicted) RV /TLC 45 ( 102% predicted, DLCO: 14.51 (70.2 % predicted)  Normal spirometry. Lung volumes not done. Diffusion capacity is mildly reduced but corrects for alveolar volume.         CT of chest performed on 20 with contrast:       1. Negative CTA chest.  No pulmonary embolism.  2. No active infiltrate.  Interstitial lung disease with coarsening of the peripheral septal markings, most notable bibasilar distribution, with mild bibasilar honeycombing.  Stable 6 mm noncalcified pulmonary nodule right lower lobe.  3. Multitude bilateral renal cortical cysts.  4. Multilevel degenerative thoracolumbar spine.  Multiple stable thoracic disc herniations, most notably at T6-7, with moderate central protrusion resulting in spinal canal stenosis (8 mm).  Scattered Schmorl's nodes.  Stable mild compression deformity/loss of height at T12.  There is also mild compression deformity of L2, new compared with 2020.      Assessment / Plan:       Discussed diagnosis, its evaluation, treatment and usual course. All questions answered.  Requested Prescriptions     Signed Prescriptions Disp Refills    fluticasone-umeclidin-vilanter (TRELEGY ELLIPTA) 200-62.5-25 mcg inhaler 60 each 11     Sig: Inhale 1 puff into the lungs once daily.    ipratropium (ATROVENT) 42 mcg (0.06 %) nasal spray 15 mL 11     Si sprays by Nasal route 4 (four) times daily.    fluticasone propionate (FLONASE) 50 mcg/actuation nasal spray 16 g 11     Si sprays (100 mcg total) by Each Nostril route once daily at 6am.     predniSONE (DELTASONE) 20 MG tablet 20 tablet 0     Sig: 3 daily x 3 days, 2 daily x 3 days, 1 daily x 3 days, 1/2 daily x 4 days.    azithromycin (Z-EMEKA) 250 MG tablet 6 tablet 0     Sig: Take 2 tablets by mouth on day 1; Take 1 tablet by mouth on days 2-5    albuterol (PROVENTIL) 2.5 mg /3 mL (0.083 %) nebulizer solution 150 mL 11     Sig: Take 3 mLs (2.5 mg total) by nebulization every 4 (four) hours as needed. Rescue       Problem List Items Addressed This Visit     Mucopurulent chronic bronchitis (Chronic)    Relevant Medications    azithromycin (Z-EMEKA) 250 MG tablet    albuterol (PROVENTIL) 2.5 mg /3 mL (0.083 %) nebulizer solution    Sacroiliitis, not elsewhere classified    Current Assessment & Plan     Managed by pain management            Chronic diastolic heart failure    Current Assessment & Plan     Stable, managed by cardiology              Other Visit Diagnoses     Moderate persistent asthmatic bronchitis with acute exacerbation    -  Primary    Relevant Medications    predniSONE (DELTASONE) 20 MG tablet    azithromycin (Z-EMEKA) 250 MG tablet    albuterol (PROVENTIL) 2.5 mg /3 mL (0.083 %) nebulizer solution    Moderate persistent asthma without complication        Relevant Medications    fluticasone-umeclidin-vilanter (TRELEGY ELLIPTA) 200-62.5-25 mcg inhaler    albuterol (PROVENTIL) 2.5 mg /3 mL (0.083 %) nebulizer solution    Thoracic spine pain        Relevant Orders    X-Ray Thoracic Spine AP Lateral (Completed)    Non-seasonal allergic rhinitis due to other allergic trigger        Relevant Medications    ipratropium (ATROVENT) 42 mcg (0.06 %) nasal spray    fluticasone propionate (FLONASE) 50 mcg/actuation nasal spray        Not well controlled asthmatic bronchitis/mucopululent bronchitis status post 2/24/2022 +Covid 19.  Stop breo/spiriva  Begin Trelegy 200 mcg triple therapy.  Continue Daliresp, Albuterol inhaler, albuterol nebs.   zpack  Prednisone taper  Non seasonal allergies with post  nasal drip and rhinorrhea  Begin Atrovent nasal. Resume Flonase.   Patient has complaint of upper Thoracic bony region pain, fall Jan 2022 and increased coughing post +covid with asthmatic bronchitis flare  Proceed with Thoracic spine xray r/o compression fracture, no new or acute compression fx at site upper thoracic spine pain, follow up with pain management for continued care.   Lungs clear to ausculation. Chest xray stable chronic findings.      I spent a total of 42 minutes on the day of the visit.  This includes face to face time and non-face to face time preparing to see the patient (eg, review of tests), obtaining and/or reviewing separately obtained history, documenting clinical information in the electronic or other health record, independently interpreting results and communicating results to the patient/family/caregiver, or care coordinator.      Follow up in about 2 months (around 5/15/2022) for Asthma/Bronchitis , evaluate treatment response.

## 2022-03-18 RX ORDER — ROFLUMILAST 500 UG/1
TABLET ORAL
Qty: 90 TABLET | Refills: 3 | Status: SHIPPED | OUTPATIENT
Start: 2022-03-18 | End: 2023-01-23

## 2022-03-20 NOTE — PROGRESS NOTES
Pharmacist Renal Dose Adjustment Note    Shireen Felix is a 73 y.o. female being treated with the medication Levofloxacin     Patient Data:    Vital Signs (Most Recent):  Temp: 97 °F (36.1 °C) (08/05/19 1157)  Pulse: 70 (08/05/19 1157)  Resp: 20 (08/05/19 1157)  BP: 93/72 (08/05/19 1157)  SpO2: 100 % (08/05/19 1157) Vital Signs (72h Range):  Temp:  [97 °F (36.1 °C)-98.1 °F (36.7 °C)]   Pulse:  [68-99]   Resp:  [16-20]   BP: ()/(49-86)   SpO2:  [98 %-100 %]      Recent Labs   Lab 07/31/19  1207 08/04/19  1615 08/05/19  0957   CREATININE 1.5* 1.9* 1.3     Serum creatinine: 1.3 mg/dL 08/05/19 0957  Estimated creatinine clearance: 32.8 mL/min    Medication: Levaquin 500mg daily will be changed to 750mg every other day.     Pharmacist's Name: Genet Virk  Pharmacist's Extension: [phone]     Intractable low back pain Intractable low back pain Intractable low back pain Intractable low back pain Intractable low back pain

## 2022-03-29 ENCOUNTER — OFFICE VISIT (OUTPATIENT)
Dept: PODIATRY | Facility: CLINIC | Age: 77
End: 2022-03-29
Payer: MEDICARE

## 2022-03-29 ENCOUNTER — OFFICE VISIT (OUTPATIENT)
Dept: OPHTHALMOLOGY | Facility: CLINIC | Age: 77
End: 2022-03-29
Payer: MEDICARE

## 2022-03-29 DIAGNOSIS — N18.30 CONTROLLED TYPE 2 DIABETES MELLITUS WITH STAGE 3 CHRONIC KIDNEY DISEASE, WITHOUT LONG-TERM CURRENT USE OF INSULIN: ICD-10-CM

## 2022-03-29 DIAGNOSIS — E11.22 CONTROLLED TYPE 2 DIABETES MELLITUS WITH STAGE 3 CHRONIC KIDNEY DISEASE, WITHOUT LONG-TERM CURRENT USE OF INSULIN: ICD-10-CM

## 2022-03-29 DIAGNOSIS — H17.9 CORNEAL SCAR, LEFT EYE: ICD-10-CM

## 2022-03-29 DIAGNOSIS — M19.072 ARTHRITIS OF LEFT FOOT: Primary | ICD-10-CM

## 2022-03-29 DIAGNOSIS — Z96.1 PSEUDOPHAKIA OF BOTH EYES: ICD-10-CM

## 2022-03-29 DIAGNOSIS — H10.13 ALLERGIC CONJUNCTIVITIS OF BOTH EYES: ICD-10-CM

## 2022-03-29 DIAGNOSIS — H40.1132 PRIMARY OPEN ANGLE GLAUCOMA (POAG) OF BOTH EYES, MODERATE STAGE: Primary | ICD-10-CM

## 2022-03-29 PROCEDURE — 99214 PR OFFICE/OUTPT VISIT, EST, LEVL IV, 30-39 MIN: ICD-10-PCS | Mod: S$GLB,,, | Performed by: OPHTHALMOLOGY

## 2022-03-29 PROCEDURE — 1160F RVW MEDS BY RX/DR IN RCRD: CPT | Mod: CPTII,S$GLB,, | Performed by: OPHTHALMOLOGY

## 2022-03-29 PROCEDURE — 99214 OFFICE O/P EST MOD 30 MIN: CPT | Mod: S$GLB,,, | Performed by: OPHTHALMOLOGY

## 2022-03-29 PROCEDURE — 1101F PR PT FALLS ASSESS DOC 0-1 FALLS W/OUT INJ PAST YR: ICD-10-PCS | Mod: CPTII,S$GLB,, | Performed by: PODIATRIST

## 2022-03-29 PROCEDURE — 99999 PR PBB SHADOW E&M-EST. PATIENT-LVL III: ICD-10-PCS | Mod: PBBFAC,,, | Performed by: PODIATRIST

## 2022-03-29 PROCEDURE — 1159F MED LIST DOCD IN RCRD: CPT | Mod: CPTII,S$GLB,, | Performed by: PODIATRIST

## 2022-03-29 PROCEDURE — 3288F FALL RISK ASSESSMENT DOCD: CPT | Mod: CPTII,S$GLB,, | Performed by: PODIATRIST

## 2022-03-29 PROCEDURE — 1159F MED LIST DOCD IN RCRD: CPT | Mod: CPTII,S$GLB,, | Performed by: OPHTHALMOLOGY

## 2022-03-29 PROCEDURE — 3072F LOW RISK FOR RETINOPATHY: CPT | Mod: CPTII,S$GLB,, | Performed by: PODIATRIST

## 2022-03-29 PROCEDURE — 3072F PR LOW RISK FOR RETINOPATHY: ICD-10-PCS | Mod: CPTII,S$GLB,, | Performed by: PODIATRIST

## 2022-03-29 PROCEDURE — 1160F PR REVIEW ALL MEDS BY PRESCRIBER/CLIN PHARMACIST DOCUMENTED: ICD-10-PCS | Mod: CPTII,S$GLB,, | Performed by: PODIATRIST

## 2022-03-29 PROCEDURE — 99999 PR PBB SHADOW E&M-EST. PATIENT-LVL III: CPT | Mod: PBBFAC,,, | Performed by: PODIATRIST

## 2022-03-29 PROCEDURE — 1160F RVW MEDS BY RX/DR IN RCRD: CPT | Mod: CPTII,S$GLB,, | Performed by: PODIATRIST

## 2022-03-29 PROCEDURE — 3288F PR FALLS RISK ASSESSMENT DOCUMENTED: ICD-10-PCS | Mod: CPTII,S$GLB,, | Performed by: PODIATRIST

## 2022-03-29 PROCEDURE — 99999 PR PBB SHADOW E&M-EST. PATIENT-LVL IV: ICD-10-PCS | Mod: PBBFAC,,, | Performed by: OPHTHALMOLOGY

## 2022-03-29 PROCEDURE — 99213 OFFICE O/P EST LOW 20 MIN: CPT | Mod: S$GLB,,, | Performed by: PODIATRIST

## 2022-03-29 PROCEDURE — 1160F PR REVIEW ALL MEDS BY PRESCRIBER/CLIN PHARMACIST DOCUMENTED: ICD-10-PCS | Mod: CPTII,S$GLB,, | Performed by: OPHTHALMOLOGY

## 2022-03-29 PROCEDURE — 99999 PR PBB SHADOW E&M-EST. PATIENT-LVL IV: CPT | Mod: PBBFAC,,, | Performed by: OPHTHALMOLOGY

## 2022-03-29 PROCEDURE — 1101F PT FALLS ASSESS-DOCD LE1/YR: CPT | Mod: CPTII,S$GLB,, | Performed by: PODIATRIST

## 2022-03-29 PROCEDURE — 1159F PR MEDICATION LIST DOCUMENTED IN MEDICAL RECORD: ICD-10-PCS | Mod: CPTII,S$GLB,, | Performed by: PODIATRIST

## 2022-03-29 PROCEDURE — 1159F PR MEDICATION LIST DOCUMENTED IN MEDICAL RECORD: ICD-10-PCS | Mod: CPTII,S$GLB,, | Performed by: OPHTHALMOLOGY

## 2022-03-29 PROCEDURE — 99213 PR OFFICE/OUTPT VISIT, EST, LEVL III, 20-29 MIN: ICD-10-PCS | Mod: S$GLB,,, | Performed by: PODIATRIST

## 2022-03-29 RX ORDER — TIMOLOL MALEATE 5 MG/ML
1 SOLUTION/ DROPS OPHTHALMIC EVERY MORNING
Qty: 5 ML | Refills: 12 | Status: SHIPPED | OUTPATIENT
Start: 2022-03-29 | End: 2023-03-16

## 2022-03-29 RX ORDER — LATANOPROST 50 UG/ML
1 SOLUTION/ DROPS OPHTHALMIC NIGHTLY
Qty: 2.5 ML | Refills: 12 | Status: SHIPPED | OUTPATIENT
Start: 2022-03-29

## 2022-03-29 NOTE — PROGRESS NOTES
SUBJECTIVE  Jetty Veronica Felix is 76 y.o. female  Corrected distance visual acuity was 20/25 in the right eye and 20/40 in the left eye.   Chief Complaint   Patient presents with    Glaucoma     Pt here for lost to f/u Latanoprost trial. No pain or discomfort. VA stable. Pt says she has been off and on compliant with her Latanoprost. She says she did not get it in last night.           HPI     Glaucoma      Additional comments: Pt here for lost to f/u Latanoprost trial. No pain   or discomfort. VA stable. Pt says she has been off and on compliant with   her Latanoprost. She says she did not get it in last night.               Comments     1. Mod COAG goal = 18-19 +Fhx coag-mother   Pt states she has had elevated pressures in the past.   2. 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 ERM OD  CR scar OD  4. DM-2005  5. PCIOL OU? -done in [place]  6. Dry Eyes  7. Refractive error PAL Rx    Latanoprost ou qhs            Last edited by Adolph Rice MA on 3/29/2022  8:19 AM. (History)         Assessment /Plan :  1. Primary open angle glaucoma (POAG) of both eyes, moderate stage     IOP  not within acceptable range relative to target IOP with risk of irreversible visual loss. Better IOP control is recommended. Discussed options, risks, and benefits of additional medication, SLT laser, and/or incisional glaucoma surgery. Reviewed importance of continued compliance with treatment and follow up.     Patient chooses to add Timolol one drop in each eye every morning. Continue Latanoprost one drop in each eye every night.    Return to clinic in 5 weeks  or as needed.  With IOP Check       2. Pseudophakia of both eyes  -- Condition stable, no therapeutic change required. Monitoring routinely.     3. Corneal scar, left eye - monitor for now   4.      Allergic Conjunctivitis, bilateral recommend Alaway one drop in each eye 2 times a day as needed

## 2022-03-29 NOTE — PROGRESS NOTES
Subjective:       Patient ID: Shireen Felix is a 76 y.o. female.    Chief Complaint: Foot Pain (Patient complains of 8/10 pain at present to left foot. She states her foot feels better. )      HPI: Shireen Felix presents to the office today to discuss results of CT scan to the left foot.  She has been ambulating in her regular shoe gear today.  States improvement of her symptoms.  Is concerned about the progressive loss of the medial longitudinal arch to the left foot.  States this is equal to bilateral foot.  She does ambulate with a walker or wheelchair at baseline. Patient's Primary Care Provider is POOL Chaudhari Jr, MD.     Review of patient's allergies indicates:   Allergen Reactions    Penicillins Anaphylaxis, Hives, Shortness Of Breath and Swelling    Adhesive Rash    Betadine [povidone-iodine] Rash     Pt confirms has completed CT w/ IV contrast-iodine without reaction or need for pre-medication.    Doxycycline Nausea Only    Oxycodone Other (See Comments)     Fatigue      Sulfa (sulfonamide antibiotics) Itching    Zanaflex [tizanidine] Anxiety       Past Medical History:   Diagnosis Date    Abnormal ECG 8/5/2019    Aneurysm     Anticoagulant long-term use     Arthritis     CAD in native artery 8/5/2019    COPD (chronic obstructive pulmonary disease)     Diabetes mellitus     Fall 10/10/2019    Formatting of this note might be different from the original. Found sitting on floor next to bed last night Mild confusion today Does not recall falling or how she ended up on floor UA today Labs yesterday unremarkable    Glaucoma     Hypertension     Seizures     Shingles 05/27/2017    Stroke        Family History   Problem Relation Age of Onset    No Known Problems Mother     No Known Problems Father        Social History     Socioeconomic History    Marital status: Single   Tobacco Use    Smoking status: Former Smoker     Packs/day: 1.00     Years: 10.00     Pack years: 10.00      Types: Cigarettes     Quit date: 2004     Years since quittin.9    Smokeless tobacco: Never Used   Substance and Sexual Activity    Alcohol use: No    Drug use: Never    Sexual activity: Not Currently   Social History Narrative    No pets or smokers in household.       Past Surgical History:   Procedure Laterality Date    BRAIN SURGERY      CARDIAC CATHETERIZATION      CORONARY ANGIOPLASTY      EPIDURAL STEROID INJECTION INTO LUMBAR SPINE Bilateral 2019    Procedure: TF XANDER L4/5;  Surgeon: Desean Dias MD;  Location: [facility];  Service: Pain Management;  Laterality: Bilateral;    HYSTERECTOMY      INJECTION OF ANESTHETIC AGENT AROUND MEDIAL BRANCH NERVES INNERVATING LUMBAR FACET JOINT Bilateral 2020    Procedure: Bilateral L3-5 MBB;  Surgeon: Desean Dias MD;  Location: [facility];  Service: Pain Management;  Laterality: Bilateral;    INJECTION OF ANESTHETIC AGENT INTO SACROILIAC JOINT Right 2021    Procedure: Right BLOCK, SACROILIAC JOINT Right GTB with RN IV sedation;  Surgeon: Yao Fulton MD;  Location: [facility];  Service: Pain Management;  Laterality: Right;    INJECTION OF JOINT Bilateral 2020    Procedure: Bilateral shoulder GH Joint injection with local;  Surgeon: Desean Dias MD;  Location: [facility];  Service: Pain Management;  Laterality: Bilateral;    TRANSFORAMINAL EPIDURAL INJECTION OF STEROID Bilateral 2019    Procedure: Bilateral L3/4 Transforaminal Epidural Steroid Injection;  Surgeon: Desean Dias MD;  [facility];  Service: Pain Management;  Laterality: Bilateral;    TRANSFORAMINAL EPIDURAL INJECTION OF STEROID Bilateral 3/10/2020    Procedure: Right T12/L1 TF XANDER with local;  Surgeon: Desean Dias MD;  Location: [facility];  Service: Pain Management;  Laterality: Bilateral;    TRANSFORAMINAL EPIDURAL INJECTION OF STEROID Right 2020    Procedure: Right T12/L1 TFESI Covid day of  procedure;  Surgeon: Desean Dias MD;  Location: [facility];  Service: Pain Management;  Laterality: Right;       Review of Systems      Objective:   LMP  (LMP Unknown)     X-Ray Thoracic Spine AP Lateral  Narrative: EXAMINATION:  XR THORACIC SPINE AP LATERAL    CLINICAL HISTORY:  upper thoracic spine pain r/o compression fracture. Fall Jan 2022.;  Pain in thoracic spine    TECHNIQUE:  AP and lateral views were obtained of the thoracic spine.    COMPARISON:  CT dated 03/19/2021    FINDINGS:  There is slight rightward convexity of the thoracic spine.  There are chronic compression deformities involving the superior endplates of T12 and L2 which appear unchanged from prior CT.  No acute fractures or subluxations demonstrated.  Thoracic intervertebral disc heights appear preserved.  Multilevel degenerative disc disease is seen in the visualized cervical spine.  Visualized osseous structures appear diffusely osteopenic.  Impression: As above.  No acute findings.    Electronically signed by: Neri Husain MD  Date:    03/15/2022  Time:    12:34  X-Ray Chest PA And Lateral  Narrative: EXAMINATION:  XR CHEST PA AND LATERAL    CLINICAL HISTORY:  Unspecified asthma, uncomplicated    TECHNIQUE:  PA and lateral views of the chest were performed.    COMPARISON:  08/17/2021    FINDINGS:  The cardiac and mediastinal silhouettes appear within normal limits.   Faint chronic appearing interstitial opacities seen in the periphery of the lungs bilaterally with no appreciable change from prior.  No focal parenchymal consolidation or definite pleural effusion visualized.  No acute osseous findings demonstrated.  Impression: Unchanged appearance of the chest as above.  No acute findings    Electronically signed by: Neri Husain MD  Date:    03/15/2022  Time:    11:24      Physical Exam    LOWER EXTREMITY PHYSICAL EXAMINATION    VASCULAR: Pulses are palpable to the B/L lower extremity. The right dorsalis pedis pulse is 2/4  and the posterior tibial pulse is 2/4. The left dorsalis pedis pulse is 2/4 and the posterior tibial pulse is 2/4. Hair growth is noted on the dorsal foot and digits. Proximal to distal, warm to warm. Capillary refill time is WNL at less than 3s.    DERMATOLOGY: Skin is supple, dry and intact. No ecchymosis is noted. No hypertrophic skin formation. No erythema or cellulitis is noted.     ORTHOPEDIC:  There has been loss of progressive decrease in the medial longitudinal arch to the left foot.  This is supple in nature.  Patient able to correct the medial longitudinal arch with inversion of the hindfoot.  She is ambulating with a minimally antalgic gait at baseline    NEUROLOGY: Protective sensation is intact via 5.07 [place] Zara monofilament. Proprioception is intact. Sensation to light touch is intact. Upon palpation of the interspaces, there are no neurological sensations stated that radiate proximal or distal. Upon compression of the metatarsal heads from medial to lateral, no neurological sensations or symptoms are stated.    Assessment:     1. Arthritis of left foot    2. Controlled type 2 diabetes mellitus with stage 3 chronic kidney disease, without long-term current use of insulin        Plan:     Arthritis of left foot    Controlled type 2 diabetes mellitus with stage 3 chronic kidney disease, without long-term current use of insulin        Thorough discussion is had with the patient this afternoon, concerning the diagnosis, its etiology, and the treatment algorithm at present.    CT was reviewed showing a small old fracture to the medial cuneiform with normal gapping of the Lisfranc joint.    Based on this, patient is unlikely to have true Lisfranc ligament injury.  Given that she is ambulating in regular shoes and has transition from the cam boot herself, I feel that she is okay to continue ambulating in regular shoe gear.  Discussed utilizing appropriate inserts to elevate the medial longitudinal  arch and correct the hindfoot.    Foot counseling and education is provided at this visit. Patient is advised to wear socks and shoes at all times.  Do not walk barefoot, or with just socks, even when indoors.  Be sure to check and inspect the inside of the shoe before putting them on her feet.  Protect your feet at all times.  Walking shoes and/or athletic shoes are the best types of shoe gear. Do not wear vinyl or plastic type shoe gear, as they do not stretch and/or breathe.  Protect your feet from hot and/or cold. Elevate the extremities when sitting.  Do not wear excessively tight socks and/or shoe gear. Wiggle your toes for a few minutes throughout the day. Move your ankles up and down, in and out, to help blood flow in your lower extremity.         Future Appointments   Date Time Provider Department Center   4/5/2022  9:20 AM Jarrell Dye MD [facility] CARDIO [facility]   4/11/2022  9:40 AM Ashley Westfall PA-C [facility] DERM [facility]   4/27/2022 10:00 AM LABORATORY, [facility] LAB [facility]   5/3/2022  7:45 AM Elie Hobbs MD [facility] OPHTHAL  Medical C   5/4/2022 10:40 AM CARLEE Chaudhari Jr., MD [facility] IM [facility]   5/6/2022 12:00 PM LABORATORY, [facility]   5/6/2022 12:30 PM Julien Hernandez MD [facility] RHEUM [facility]   5/6/2022  1:00 PM INJECTION 1, [facility] INFUSION [facility] INFSN [facility]   5/16/2022 10:40 AM Dyana Fishman NP [facility] PULMSVC  Medical C   5/17/2022  9:45 AM Rizwan Navarro MD [facility] ORTHO [facility]   6/14/2022 11:00 AM Donna Ramirez NP [facility] UROLOGY [facility]   6/14/2022  2:00 PM Priya Benavidez NP [facility] NEURO  Medical C

## 2022-04-08 ENCOUNTER — OFFICE VISIT (OUTPATIENT)
Dept: INTERNAL MEDICINE | Facility: CLINIC | Age: 77
End: 2022-04-08
Payer: MEDICARE

## 2022-04-08 VITALS
WEIGHT: 134.94 LBS | OXYGEN SATURATION: 95 % | SYSTOLIC BLOOD PRESSURE: 148 MMHG | HEART RATE: 97 BPM | HEIGHT: 64 IN | DIASTOLIC BLOOD PRESSURE: 80 MMHG | BODY MASS INDEX: 23.04 KG/M2

## 2022-04-08 DIAGNOSIS — J41.1 MUCOPURULENT CHRONIC BRONCHITIS: Primary | Chronic | ICD-10-CM

## 2022-04-08 DIAGNOSIS — I10 ESSENTIAL HYPERTENSION: ICD-10-CM

## 2022-04-08 DIAGNOSIS — M50.30 DDD (DEGENERATIVE DISC DISEASE), CERVICAL: ICD-10-CM

## 2022-04-08 PROCEDURE — 3077F SYST BP >= 140 MM HG: CPT | Mod: CPTII,S$GLB,, | Performed by: PEDIATRICS

## 2022-04-08 PROCEDURE — 1125F AMNT PAIN NOTED PAIN PRSNT: CPT | Mod: CPTII,S$GLB,, | Performed by: PEDIATRICS

## 2022-04-08 PROCEDURE — 1160F RVW MEDS BY RX/DR IN RCRD: CPT | Mod: CPTII,S$GLB,, | Performed by: PEDIATRICS

## 2022-04-08 PROCEDURE — 3079F DIAST BP 80-89 MM HG: CPT | Mod: CPTII,S$GLB,, | Performed by: PEDIATRICS

## 2022-04-08 PROCEDURE — 99214 OFFICE O/P EST MOD 30 MIN: CPT | Mod: S$GLB,,, | Performed by: PEDIATRICS

## 2022-04-08 PROCEDURE — 1160F PR REVIEW ALL MEDS BY PRESCRIBER/CLIN PHARMACIST DOCUMENTED: ICD-10-PCS | Mod: CPTII,S$GLB,, | Performed by: PEDIATRICS

## 2022-04-08 PROCEDURE — 99214 PR OFFICE/OUTPT VISIT, EST, LEVL IV, 30-39 MIN: ICD-10-PCS | Mod: S$GLB,,, | Performed by: PEDIATRICS

## 2022-04-08 PROCEDURE — 3079F PR MOST RECENT DIASTOLIC BLOOD PRESSURE 80-89 MM HG: ICD-10-PCS | Mod: CPTII,S$GLB,, | Performed by: PEDIATRICS

## 2022-04-08 PROCEDURE — 3288F FALL RISK ASSESSMENT DOCD: CPT | Mod: CPTII,S$GLB,, | Performed by: PEDIATRICS

## 2022-04-08 PROCEDURE — 99999 PR PBB SHADOW E&M-EST. PATIENT-LVL V: ICD-10-PCS | Mod: PBBFAC,,, | Performed by: PEDIATRICS

## 2022-04-08 PROCEDURE — 1125F PR PAIN SEVERITY QUANTIFIED, PAIN PRESENT: ICD-10-PCS | Mod: CPTII,S$GLB,, | Performed by: PEDIATRICS

## 2022-04-08 PROCEDURE — 1101F PT FALLS ASSESS-DOCD LE1/YR: CPT | Mod: CPTII,S$GLB,, | Performed by: PEDIATRICS

## 2022-04-08 PROCEDURE — 3072F LOW RISK FOR RETINOPATHY: CPT | Mod: CPTII,S$GLB,, | Performed by: PEDIATRICS

## 2022-04-08 PROCEDURE — 3077F PR MOST RECENT SYSTOLIC BLOOD PRESSURE >= 140 MM HG: ICD-10-PCS | Mod: CPTII,S$GLB,, | Performed by: PEDIATRICS

## 2022-04-08 PROCEDURE — 3288F PR FALLS RISK ASSESSMENT DOCUMENTED: ICD-10-PCS | Mod: CPTII,S$GLB,, | Performed by: PEDIATRICS

## 2022-04-08 PROCEDURE — 3072F PR LOW RISK FOR RETINOPATHY: ICD-10-PCS | Mod: CPTII,S$GLB,, | Performed by: PEDIATRICS

## 2022-04-08 PROCEDURE — 99999 PR PBB SHADOW E&M-EST. PATIENT-LVL V: CPT | Mod: PBBFAC,,, | Performed by: PEDIATRICS

## 2022-04-08 PROCEDURE — 1159F MED LIST DOCD IN RCRD: CPT | Mod: CPTII,S$GLB,, | Performed by: PEDIATRICS

## 2022-04-08 PROCEDURE — 1101F PR PT FALLS ASSESS DOC 0-1 FALLS W/OUT INJ PAST YR: ICD-10-PCS | Mod: CPTII,S$GLB,, | Performed by: PEDIATRICS

## 2022-04-08 PROCEDURE — 1159F PR MEDICATION LIST DOCUMENTED IN MEDICAL RECORD: ICD-10-PCS | Mod: CPTII,S$GLB,, | Performed by: PEDIATRICS

## 2022-04-08 RX ORDER — METOPROLOL TARTRATE 50 MG/1
50 TABLET ORAL 2 TIMES DAILY
Qty: 60 TABLET | Refills: 3 | Status: SHIPPED | OUTPATIENT
Start: 2022-04-08 | End: 2022-06-17

## 2022-04-08 NOTE — PROGRESS NOTES
Subjective:       Patient ID: Shireen Felix is a 76 y.o. female.    Chief Complaint: Neck Pain    Shireen Felix is a 76 y.o. female who presents to clinic for neck pain following a fall in January. Has been seeing Dr. Fulton (pain management). This has been chronic and on going. Has not done PT. Also complains of COPD/Asthma flare following COVID. Has Sx of SOB, cough, chest congestion, and increased HR. Saw pulm recently, was placed on trilegy which has helped slight and oral steroids which have helped more. HR and BP elevated at home, only taking metoprolol  25mg BID. Maintains Amlodipine and Diovan.     Review of Systems   Constitutional: Negative for activity change, appetite change, chills, diaphoresis, fatigue, fever and unexpected weight change.   HENT: Negative for nasal congestion, ear pain, mouth sores, nosebleeds, postnasal drip, rhinorrhea, sneezing and sore throat.    Eyes: Negative for photophobia, pain, discharge, redness and visual disturbance.   Respiratory: Positive for cough and shortness of breath. Negative for chest tightness, wheezing and stridor.    Cardiovascular: Positive for palpitations. Negative for chest pain and leg swelling.   Gastrointestinal: Negative for constipation, diarrhea, nausea and vomiting.   Genitourinary: Negative for decreased urine volume, difficulty urinating, dysuria, flank pain, frequency, hematuria and urgency.   Musculoskeletal: Positive for neck pain. Negative for arthralgias, back pain, joint swelling and neck stiffness.   Integumentary:  Negative for color change and rash.   Neurological: Negative for dizziness, syncope, speech difficulty, weakness, light-headedness and headaches.   Hematological: Negative for adenopathy. Does not bruise/bleed easily.   Psychiatric/Behavioral: Negative for confusion, decreased concentration, dysphoric mood, hallucinations, sleep disturbance and suicidal ideas. The patient is not nervous/anxious.    All other systems  reviewed and are negative.        Objective:      Physical Exam  Vitals and nursing note reviewed.   Constitutional:       General: She is not in acute distress.     Appearance: She is well-developed.   Neck:      Thyroid: No thyromegaly.      Vascular: No JVD.   Cardiovascular:      Rate and Rhythm: Normal rate and regular rhythm.      Heart sounds: Normal heart sounds. No murmur heard.  Pulmonary:      Effort: Pulmonary effort is normal. No respiratory distress.      Breath sounds: Normal breath sounds. No wheezing or rales.      Comments: Slightly prolonged breathing, no wheezing, coughs frequently  Abdominal:      General: There is no distension.      Palpations: Abdomen is soft. There is no mass.      Tenderness: There is no abdominal tenderness. There is no guarding.   Musculoskeletal:      Right lower leg: No edema.      Left lower leg: No edema.   Lymphadenopathy:      Cervical: No cervical adenopathy.   Skin:     Capillary Refill: Capillary refill takes less than 2 seconds.      Findings: No rash.   Neurological:      General: No focal deficit present.      Mental Status: She is alert and oriented to person, place, and time.      Cranial Nerves: No cranial nerve deficit.      Coordination: Coordination normal.   Psychiatric:         Mood and Affect: Mood normal.         Behavior: Behavior normal.         Thought Content: Thought content normal.         Judgment: Judgment normal.         Assessment:       Problem List Items Addressed This Visit     Mucopurulent chronic bronchitis - Primary (Chronic)    DDD (degenerative disc disease), cervical      Other Visit Diagnoses     Essential hypertension        Relevant Medications    metoprolol tartrate (LOPRESSOR) 50 MG tablet    Other Relevant Orders    Ambulatory referral/consult to Physical/Occupational Therapy          Plan:     Mucopurulent chronic bronchitis    Essential hypertension  -     Ambulatory referral/consult to Physical/Occupational Therapy;  Future; Expected date: 04/15/2022  -     metoprolol tartrate (LOPRESSOR) 50 MG tablet; Take 1 tablet (50 mg total) by mouth 2 (two) times daily.  Dispense: 60 tablet; Refill: 3    DDD (degenerative disc disease), cervical    These are chronic conditions for patient, COPD worsened after covid. I will message pulmonary for their advice on med change for continuation of oral steroids(but risk for osteoporosis) and Dr Fulton for options for any additional imaging or treatment. Start PT. Increase metoprolol, if pulmonary feels not prudent due to COPD, then add clonidine.  Scribe Attestation:   I, Ben Espinoza, am scribing for, and in the presence of, Dr. Laron Chaudhari Jr. I performed the above scribed service and the documentation accurately describes the services I performed. I attest to the accuracy of the note.    I, Dr. Laron Chaudhari Jr, reviewed documentation as scribed above. I personally performed the services described in this documentation.  I agree that the record reflects my personal performance and is accurate and complete. Laron Chaudhari Jr., MD.  04/08/2022

## 2022-04-11 ENCOUNTER — OFFICE VISIT (OUTPATIENT)
Dept: DERMATOLOGY | Facility: CLINIC | Age: 77
End: 2022-04-11
Payer: MEDICARE

## 2022-04-11 DIAGNOSIS — R20.2 PARESTHESIA AND PAIN OF BOTH UPPER EXTREMITIES: ICD-10-CM

## 2022-04-11 DIAGNOSIS — L29.9 PRURITUS: ICD-10-CM

## 2022-04-11 DIAGNOSIS — L82.1 SEBORRHEIC KERATOSES: ICD-10-CM

## 2022-04-11 DIAGNOSIS — M79.601 PARESTHESIA AND PAIN OF BOTH UPPER EXTREMITIES: ICD-10-CM

## 2022-04-11 DIAGNOSIS — L21.9 SEBORRHEIC DERMATITIS: Primary | ICD-10-CM

## 2022-04-11 DIAGNOSIS — B02.29 POST HERPETIC NEURALGIA: ICD-10-CM

## 2022-04-11 DIAGNOSIS — M79.602 PARESTHESIA AND PAIN OF BOTH UPPER EXTREMITIES: ICD-10-CM

## 2022-04-11 PROCEDURE — 1159F MED LIST DOCD IN RCRD: CPT | Mod: CPTII,S$GLB,, | Performed by: PHYSICIAN ASSISTANT

## 2022-04-11 PROCEDURE — 99999 PR PBB SHADOW E&M-EST. PATIENT-LVL IV: ICD-10-PCS | Mod: PBBFAC,,, | Performed by: PHYSICIAN ASSISTANT

## 2022-04-11 PROCEDURE — 1101F PT FALLS ASSESS-DOCD LE1/YR: CPT | Mod: CPTII,S$GLB,, | Performed by: PHYSICIAN ASSISTANT

## 2022-04-11 PROCEDURE — 3072F LOW RISK FOR RETINOPATHY: CPT | Mod: CPTII,S$GLB,, | Performed by: PHYSICIAN ASSISTANT

## 2022-04-11 PROCEDURE — 1126F AMNT PAIN NOTED NONE PRSNT: CPT | Mod: CPTII,S$GLB,, | Performed by: PHYSICIAN ASSISTANT

## 2022-04-11 PROCEDURE — 99999 PR PBB SHADOW E&M-EST. PATIENT-LVL IV: CPT | Mod: PBBFAC,,, | Performed by: PHYSICIAN ASSISTANT

## 2022-04-11 PROCEDURE — 1159F PR MEDICATION LIST DOCUMENTED IN MEDICAL RECORD: ICD-10-PCS | Mod: CPTII,S$GLB,, | Performed by: PHYSICIAN ASSISTANT

## 2022-04-11 PROCEDURE — 1160F RVW MEDS BY RX/DR IN RCRD: CPT | Mod: CPTII,S$GLB,, | Performed by: PHYSICIAN ASSISTANT

## 2022-04-11 PROCEDURE — 3288F PR FALLS RISK ASSESSMENT DOCUMENTED: ICD-10-PCS | Mod: CPTII,S$GLB,, | Performed by: PHYSICIAN ASSISTANT

## 2022-04-11 PROCEDURE — 99203 OFFICE O/P NEW LOW 30 MIN: CPT | Mod: S$GLB,,, | Performed by: PHYSICIAN ASSISTANT

## 2022-04-11 PROCEDURE — 1101F PR PT FALLS ASSESS DOC 0-1 FALLS W/OUT INJ PAST YR: ICD-10-PCS | Mod: CPTII,S$GLB,, | Performed by: PHYSICIAN ASSISTANT

## 2022-04-11 PROCEDURE — 1126F PR PAIN SEVERITY QUANTIFIED, NO PAIN PRESENT: ICD-10-PCS | Mod: CPTII,S$GLB,, | Performed by: PHYSICIAN ASSISTANT

## 2022-04-11 PROCEDURE — 99203 PR OFFICE/OUTPT VISIT, NEW, LEVL III, 30-44 MIN: ICD-10-PCS | Mod: S$GLB,,, | Performed by: PHYSICIAN ASSISTANT

## 2022-04-11 PROCEDURE — 1160F PR REVIEW ALL MEDS BY PRESCRIBER/CLIN PHARMACIST DOCUMENTED: ICD-10-PCS | Mod: CPTII,S$GLB,, | Performed by: PHYSICIAN ASSISTANT

## 2022-04-11 PROCEDURE — 3072F PR LOW RISK FOR RETINOPATHY: ICD-10-PCS | Mod: CPTII,S$GLB,, | Performed by: PHYSICIAN ASSISTANT

## 2022-04-11 PROCEDURE — 3288F FALL RISK ASSESSMENT DOCD: CPT | Mod: CPTII,S$GLB,, | Performed by: PHYSICIAN ASSISTANT

## 2022-04-11 RX ORDER — TRIAMCINOLONE ACETONIDE 0.25 MG/G
CREAM TOPICAL 2 TIMES DAILY
Qty: 30 G | Refills: 0 | Status: SHIPPED | OUTPATIENT
Start: 2022-04-11

## 2022-04-11 RX ORDER — KETOCONAZOLE 20 MG/G
CREAM TOPICAL 2 TIMES DAILY
Qty: 30 G | Refills: 1 | Status: SHIPPED | OUTPATIENT
Start: 2022-04-11

## 2022-04-11 NOTE — PROGRESS NOTES
Subjective:       Patient ID:  Shireen Felix is a 76 y.o. female who presents for   Chief Complaint   Patient presents with    Itching    Skin Discoloration     Discoloration on face      History of Present Illness: The patient presents with chief complaint of itching and pain.  Location: left face (Left forehead, scalp, and near eyelid)  Duration: 4 years   Signs/Symptoms: tingling and itching and hypersensitivity of area after shingles infection 4 years ago.  She notes she sees ophthalmology, pain management, and PCP. Is on Gabapentin per pain mgmt.      Prior treatments: gabapentin, anti-itch cream prn    C/o itching of right neck and left posterior shoulder which are intermittent, had a flare last night. States it is intense at times. She denies brooks rash    C/o spot of right ear.      Review of Systems     Objective:    Physical Exam   Constitutional: She appears well-developed and well-nourished. No distress.   Neurological: She is alert and oriented to person, place, and time. She is not disoriented.   Psychiatric: She has a normal mood and affect.   Skin:   Areas Examined (abnormalities noted in diagram):   Scalp / Hair Palpated and Inspected  Head / Face Inspection Performed  Neck Inspection Performed  Chest / Axilla Inspection Performed  Back Inspection Performed  RUE Inspected  LUE Inspection Performed                   Diagram Legend     Erythematous scaling macule/papule c/w actinic keratosis       Vascular papule c/w angioma      Pigmented verrucoid papule/plaque c/w seborrheic keratosis      Yellow umbilicated papule c/w sebaceous hyperplasia      Irregularly shaped tan macule c/w lentigo     1-2 mm smooth white papules consistent with Milia      Movable subcutaneous cyst with punctum c/w epidermal inclusion cyst      Subcutaneous movable cyst c/w pilar cyst      Firm pink to brown papule c/w dermatofibroma      Pedunculated fleshy papule(s) c/w skin tag(s)      Evenly pigmented macule c/w  junctional nevus     Mildly variegated pigmented, slightly irregular-bordered macule c/w mildly atypical nevus      Flesh colored to evenly pigmented papule c/w intradermal nevus       Pink pearly papule/plaque c/w basal cell carcinoma      Erythematous hyperkeratotic cursted plaque c/w SCC      Surgical scar with no sign of skin cancer recurrence      Open and closed comedones      Inflammatory papules and pustules      Verrucoid papule consistent consistent with wart     Erythematous eczematous patches and plaques     Dystrophic onycholytic nail with subungual debris c/w onychomycosis     Umbilicated papule    Erythematous-base heme-crusted tan verrucoid plaque consistent with inflamed seborrheic keratosis     Erythematous Silvery Scaling Plaque c/w Psoriasis     See annotation      Assessment / Plan:        Seborrheic dermatitis  -     ketoconazole (NIZORAL) 2 % cream; Apply topically 2 (two) times daily. For dry flaky patches of ear.  Dispense: 30 g; Refill: 1  -     triamcinolone acetonide 0.025% (KENALOG) 0.025 % cream; Apply topically 2 (two) times daily. PRN rash and itching of ear. Mild steroid cream.  Dispense: 30 g; Refill: 0  Trial of above rx meds. Discussed waxing and waning.    Post herpetic neuralgia  Pruritus  H/o HSV of area. Encouraged to keep f/u with pain management. Provided reassurance no active rash or irritation. Trial of TAC 0.025% cream prn flares, but advised not a long term med.    Paresthesia and pain of both upper extremities  Pruritus  Pt endorses h/o of both shoulder and cervical injuries. Advised to keep f/u with pain management. Reassurance no rash or skin lesions today.    Seborrheic keratoses  Reassurance given.  Lesions are benign.         Follow up in about 3 months (around 7/11/2022) for to see Dermatology MD.

## 2022-04-12 DIAGNOSIS — J45.41 MODERATE PERSISTENT ASTHMATIC BRONCHITIS WITH ACUTE EXACERBATION: ICD-10-CM

## 2022-04-13 ENCOUNTER — TELEPHONE (OUTPATIENT)
Dept: INTERNAL MEDICINE | Facility: CLINIC | Age: 77
End: 2022-04-13
Payer: MEDICARE

## 2022-04-13 RX ORDER — PREDNISONE 20 MG/1
TABLET ORAL
Qty: 20 TABLET | Refills: 0 | Status: SHIPPED | OUTPATIENT
Start: 2022-04-13 | End: 2022-05-09

## 2022-04-13 NOTE — TELEPHONE ENCOUNTER
----- Message from Carin Renae MA sent at 4/13/2022 10:18 AM CDT -----  Regarding: predniSONE  Pt states on 4/08 she talked to you about reaching out pulmonary for that dr to give her a refill of predniSONE (DELTASONE) 20 MG tablet but never heard back from you. So she was looking for a status update.      ----- Message -----  From: Janna Eddy  Sent: 4/12/2022   9:53 AM CDT  To: Watson Skinner Jr Staff    Patient need to speak to nurse regarding her medication. Call back number [phone]. Tks

## 2022-04-13 NOTE — PROGRESS NOTES
Chief Pain Complaint:  Neck Pain   Lumbar Back Pain   Facial Pain       History of Present Illness:   Shireen Felix is a 76 y.o. female  who is presenting with a chief complaint of Neck Pain. The patient began experiencing this problem insidiously, and the pain has been gradually worsening over the past 6 month(s). The pain is described as throbbing, cramping, aching and heavy and is located in the bilateral cervical spine. Pain is intermittent and lasts hours. The  pain is nonradiating. The patient rates her pain a 7 out of ten and interferes with activities of daily living a 7 out of ten. Pain is exacerbated by rotation of the cervical spine, and is improved by rest. Patient reports no prior trauma, no prior spinal surgery     This patient is a 74 y.o. female who presents today complaining of the above noted pain/s. The patient describes the pain as follows.  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  She reports approximately 2 years ago she had to sudden deaths in the family which caused very stressful time for her and resulted in shingles rash in the left V1 distribution however she reports having excruciating pain in the left V1 and V2 distributions.  She denies having difficulty with eating food and brushing her teeth on the left side of her face however the left lateral side of her nose gets her excruciating pain.  She has been tried on numerous medications in the past including gabapentin, Lyrica, Valtrex, Celebrex, ibuprofen which have all provided minimal benefit however steroids have been most helpful.  Today she rates her pain as an 4/10 and describes a constant burning sensation the left side of her face in addition to radiation into her bilateral lower extremities, her right shoulder, her left upper extremity occasionally as a pins and needle sensation.  She  Infusion Nursing Note    Johnathon Kemp Presents to Saint Francis Medical Center does report having numbness and weakness in her left lower extremity.  She has been physical therapy which she completed in February of 2019 however this caused most of her low back and leg symptoms to worsen in addition to her post herpetic neuralgia to worsen.  She denies having bowel bladder difficulties.  Her symptoms are worse with activity such as walking in her somewhat improved with rest however she had finds it difficult to get into a comfortable position. She has been using topical lidocaine patches and applying to the left side of her face which does provide significant benefit.  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    Shireen Felix is a 76 y.o. female  who is presenting with a chief complaint of lumbar spine. The patient began experiencing this problem insidiously, and the pain has been gradually worsening over the past 2 year(s). The pain is described as throbbing, shooting, burning, aching and electrical and is located in the bilateral lumbar spine. Pain is intermittent and lasts hours. The pain radiates to bilateral lower extremities. The patient rates her pain a 7 out of ten and interferes with activities of daily living a 6 out of ten. Pain is exacerbated by flexion of the lumbar spine, and is improved by rest. Patient reports no prior trauma, no prior spinal surgery       - pertinent negatives: No fever, No chills, No weight loss, No bladder dysfunction, No bowel dysfunction, No saddle anesthesia  - pertinent positives: generalized nonspecific Lower Extremity weakness bilaterally    - medications, other therapies tried (physical therapy, injections):     >> NSAIDs, Tylenol, Tramadol, Norco, gabapentin, lyrica, flexeril and medrol dose pack    >> Has previously undergone Physical Therapy    >> Has previously undergone spinal injection/s           Bilateral L3/4 TF XANDER on 8/4/19 with 75% pain relief, bilateral L4/5 transforaminal epidural steroid injections with 80% symptomatic pain relief, bilateral L3-5 lumbar medial branch blocks with 100% symptomatic pain relief, T12-L1 transforaminal epidural steroid injection with 100% symptomatic pain relief         Right SI Right GTB injection on 11/16/21 with 80% relief      Imaging / Labs / Studies (reviewed on 3/9/2022):    Results for orders placed during the hospital encounter of 02/06/20    CT Lumbar Spine Without Contrast    Narrative  EXAMINATION:  CT LUMBAR SPINE WITHOUT CONTRAST    CLINICAL HISTORY:  Low back painLow back pain, >6wks conservative tx, persistent-progressive sx, surgical candidate;    FINDINGS:  The lumbar vertebral bodies demonstrate adequate alignment.Disc space narrowing from T11 through S1.No acute fractures are identified.    T11-T12: Disc space narrowing.  Broad-based disc bulge.  Old vertebral endplate fracture of T12.    T12-L1: Small disc protrusion paramedian to the right side.  Degenerative changes of the vertebral endplates and facet joints.    L1-L2: Old fracture of the inferior endplate of L1.  Mild posterior displacement of a fracture fragment minimally narrowing the AP diameter of the canal.  Gas in the disc space consistent with degenerating disc material.    L2-L3: Circumferential disc bulge.  Bilateral degenerative changes of the facets.  Hypertrophy of the ligamentum flavum.  Moderate to severe central spinal stenosis.  Bony neural foraminal narrowing without definite nerve root impingement.    L3-L4: Circumferential disc bulge.  Bilateral degenerative changes of the facets.  Degenerative changes of the vertebral endplates.  Severe central spinal stenosis.  Hypertrophy of the ligamentum flavum and degenerative changes of the facets.    L4-L5: Circumferential disc bulge.  Severe central spinal stenosis.  Bilateral degenerative changes of the facets.  Bilateral bony neural  foraminal narrowing and possible bilateral L4 nerve root impingement.    L5-S1: Circumferential disc bulge.  Gas formation within the disc.  Moderate central spinal stenosis.  Bilateral degenerative changes of the facets.  Bilateral bony neural foraminal narrowing with possible bilateral L5 nerve root impingement.    Atherosclerotic abdominal aorta without aneurysmal dilatation.    Impression  Mild lumbar scoliotic curvature with multilevel degenerative changes as described above.  Disc space narrowings are seen most significantly at the L3-4 and L4-5 levels.  Bilateral bony neural foraminal narrowings are seen most significantly at the L3-4 and L4-5 levels.  Neural foraminal narrowing is also significantly identified on the left side at the L5-S1 level with possible left-sided L5 nerve root impingement.  Other degenerative changes as described above.  Evidence of an old L1 compression fracture with a healed fragment of the inferior posterior L1 vertebra mildly impinging on the ventral thecal sac.    Comparison to the prior study dated 08/06/2019 shows that the L1 fracture occurred in the interval.    All CT scans at this facility are performed  using dose modulation techniques as appropriate to performed exam including the following:  automated exposure control; adjustment of mA and/or kV according to the patients size (this includes techniques or standardized protocols for targeted exams where dose is matched to indication/reason for exam: i.e. extremities or head);  iterative reconstruction technique.      Electronically signed by: Brody Kumari MD  Date:    02/06/2020  Time:    08:28    Results for orders placed during the hospital encounter of 07/31/19    CT Cervical Spine Without Contrast    Narrative  EXAMINATION:  CT CERVICAL SPINE WITHOUT CONTRAST    CLINICAL HISTORY:  C-spine trauma, NEXUS/CCR positive, +risk factor(s);    TECHNIQUE:  Contiguous axial CT images were obtained of the cervical spine without  intravenous contrast. Coronal and sagittal reformations were acquired.    COMPARISON:  None    FINDINGS:  Osteopenia.  Grade 2 degenerative spondylolisthesis at C3-C4.  Grade 1 degenerative spondylolisthesis at C4-C5.  Vertebral body height is normal.  No acute fractures. No prevertebral soft tissue swelling. Atlanto-occipital articulation is normal.  Congenitally patulous spinal canal.  No significant spinal stenosis.  Moderate left foraminal stenosis at C3-C4.    Impression  No acute findings.  Degenerative spondylolisthesis at C3-C4 and C4-C5.    All CT scans at this facility use dose modulation, iterative reconstruction, and/or weight base dosing when appropriate to reduce radiation dose to as low as reasonably achievable.      Electronically signed by: Zeke Mccarthy Jr., MD  Date:    07/31/2019  Time:    12:32      Results for orders placed during the hospital encounter of 10/31/19    X-Ray Lumbar Complete With Flex And Ext    Narrative  EXAMINATION:  XR LUMBAR SPINE 5 VIEW WITH FLEX AND EXT    CLINICAL HISTORY:  low back pain;  Pain in right hip    TECHNIQUE:  Five views of the lumbar spine plus flexion extension views were performed.    COMPARISON:  06/04/2019    FINDINGS:  Mild levoscoliosis of the lumbar spine is unchanged.  Chronic T12 compression fracture deformity appears unchanged.  No definite spondylolisthesis demonstrated.  No change in spinal alignment with flexion or extension to suggest instability.  Multilevel disc height loss again noted and unchanged, remaining most severe from L3-4 through L5-S1.  No pars defects visualized.  Posterior elements appear grossly intact. No acute fractures or subluxations are demonstrated.  Visualized osseous structures appear diffusely osteopenic.  Prior bilateral hip arthroplasties noted.    Impression  Unchanged appearance of the lumbar spine as above.      Electronically signed by: Neri Husain MD  Date:    10/31/2019  Time:    12:57    Results for orders  placed during the hospital encounter of 01/23/20    X-Ray Lumbar Spine Ap And Lateral    Narrative  EXAMINATION:  XR LUMBAR SPINE AP AND LATERAL    CLINICAL HISTORY:  Low back pain, >6wks conservative tx, persistent-progressive sx, surgical candidate;Low back pain    TECHNIQUE:  AP, lateral and spot images were performed of the lumbar spine.    COMPARISON:  10/31/2019    FINDINGS:  Levoscoliosis present.  Vertebral body heights stable.  Alignment unchanged without spondylolisthesis.  Similar appearing multilevel degenerative disc height loss and osteophyte findings noted.  Multilevel facet degenerative findings remain.  Aorta iliac atherosclerotic calcification.  Colonic constipation findings present.    Impression  Similar appearance of the lumbar spine with prominent multilevel degenerative findings.  Constipation.      Electronically signed by: Davy Gutierrez MD  Date:    01/23/2020  Time:    15:36    Results for orders placed during the hospital encounter of 06/04/19    X-Ray Cervical Spine AP And Lateral    Narrative  EXAMINATION:  XR CERVICAL SPINE AP LATERAL    CLINICAL HISTORY:  Spondylosis without myelopathy or radiculopathy, cervical region    TECHNIQUE:  AP, lateral and open mouth views of the cervical spine were performed.    COMPARISON:  None.    FINDINGS:  Reversal normal C-spine curvature noted on the lateral view with visualization to the C7-T1 level.  Minimal anterior subluxation C4 on C5 noted.  There is multilevel marginal spurring and varying degrees of loss of disc height throughout the C-spine.  No acute fracture identified in prevertebral soft tissues, C1-2 articulation and odontoid appear within normal limits allowing for positioning.  Minimal vascular calcification identified on the left in the area of the carotid vessels.    Impression  Multilevel cervical spondylosis with straightening of the normal C-spine curvature and minimal anterior subluxation C4 on C5.    No acute  "fracture.    Follow-up and/or further evaluation as warranted.      Electronically signed by: Mario Flanagan MD  Date:    06/05/2019  Time:    07:51      Review of Systems:  CONSTITUTIONAL: patient denies any fever, chills, or weight loss  SKIN: patient denies any rash or itching  RESPIRATORY: patient denies having any shortness of breath  GASTROINTESTINAL: patient denies having any diarrhea, constipation, or bowel incontinence  GENITOURINARY: patient denies having any abnormal bladder function    MUSCULOSKELETAL:  - patient complains of the above noted pain/s (see chief pain complaint)    NEUROLOGICAL:   - pain as above  - strength in Upper and Lower extremities is decreased, BILATERALLY  - sensation in Upper extremities is intact, BILATERALLY  - patient denies any loss of bowel or bladder control      PSYCHIATRIC: patient denies any change in mood    Other:  All other systems reviewed and are negative      Physical Exam:  BP (!) 162/79   Pulse 85   Ht 5' 4" (1.626 m)   Wt 62 kg (136 lb 11 oz)   LMP  (LMP Unknown)   BMI 23.46 kg/m²  (reviewed on 3/9/2022)  General: Alert and oriented, in no apparent distress.  Gait: normal gait.  Skin: No rashes, No discoloration, No obvious lesions  HEENT: Normocephalic, atraumatic. Pupils equal and round.  Cardiovascular: Regular rate and rhythm , no significant peripheral edema present  Respiratory: Without audible wheezing, without use of accessory muscles of respiration.    Musculoskeletal:    Cervical Spine    - Pain on flexion of cervical spine Absent  - Spurling's Test:  Absent    - Pain on extension of cervical spine Present  - TTP over the cervical facet joints Present bilateral C6-7  - Cervical facet loading Present    -TTP over bilateral rhomboids       Lumbar Spine    - Pain on flexion of lumbar spine Present  - Straight Leg Raise:  Equivocal    - Pain on extension of lumbar spine Present  - TTP over the lumbar facet joints Present bilateral L5-S1   - Lumbar facet " loading Present    -Pain on palpation over the SI joint  Absent  - CARL: Absent      Neuro:    Strength:  UE R/L: D: 5/5; B: 5/5; T: 4/4; WF: 5/5; WE: 5/5; IO: 5/5;  LE R/L: HF: 4/4, HE: 4/4, KF: 4/4; KE: 4/4; FE: 5/5; FF: 5/5    Extremity Reflexes: Brisk and symmetric throughout.      Extremity Sensory: Sensation to pinprick and temperature symmetric. Proprioception intact.      Psych:  Mood and affect is appropriate      Assessment:    Shireen Felix is a 76 y.o. year old female who is presenting with   Encounter Diagnoses   Name Primary?    Myofascial muscle pain Yes    Lumbar spondylosis     Lumbar facet arthropathy        Plan:    1. Interventional: Consider Right then Left L3, 4,5 MBB RFA. Patient will call to schedule.       S/p Right SI Right GTB injection on 11/16/21 with 80% relief.      Consider bilateral C5, 6,7 MBB.     2. Pharmacologic: Continue Tizanidine 4 mg PO BID PRN. Consider increasing Gabapentin from 100 mg PO BID.     3. Rehabilitative: Encouraged ambulation.    4. Diagnostic: Cervical CT reviewed.     5. Consult: Patient is seeing Neurosurgery who proposed posterior decompression/fusion of lumbar spine but she is not wanting surgery at this time.       6. Follow up: PRN.     PROCEDURE NOTE    Diagnosis: Myofascial pain  Procedure: trigger point injections to Right lumbar back right Rhomboid     Risks and benefits of procedure explained to patient including risks of infection, bleeding, pain, or damage to surrounding tissues. All questions answered. Informed consent obtained prior to proceeding. Areas marked and prepped in sterile fashion. Using a 27g 1.25inch needle, a 8 cc mixture of depo medrol 1cc (40mg) and 1% lidocaine was injected evenly into the above mentioned muscles. None to minimal bleeding noted. ER and post injection instructions given.    20 minutes were spent in this encounter with more than 50% of the time used for counseling and review of the plan.  Imaging / studies  reviewed, detailed above.  I discussed in detail the risks, benefits, and alternatives to any and all potential treatment options.  All questions and concerns were fully addressed today in clinic. Medical decision making moderate.    Thank you for the opportunity to assist in the care of this patient.    Best wishes,    Signed:    Yao Fulton MD          Disclaimer:  This note may have been prepared using voice recognition software, it may have not been extensively proofed, as such there could be errors within the text such as sound alike errors.

## 2022-04-18 ENCOUNTER — TELEPHONE (OUTPATIENT)
Dept: PAIN MEDICINE | Facility: CLINIC | Age: 77
End: 2022-04-18
Payer: MEDICARE

## 2022-04-18 NOTE — TELEPHONE ENCOUNTER
----- Message from Radha Diallo sent at 4/18/2022  9:56 AM CDT -----  Contact: Patient, [phone]  Calling to speak with the nurse regarding she hurt her back again. Please call her. Thanks.

## 2022-04-22 ENCOUNTER — TELEPHONE (OUTPATIENT)
Dept: PAIN MEDICINE | Facility: CLINIC | Age: 77
End: 2022-04-22
Payer: MEDICARE

## 2022-04-22 NOTE — TELEPHONE ENCOUNTER
----- Message from Bennett Talamantes sent at 4/22/2022  3:47 PM CDT -----  F.Type:  Patient Returning Call    Who Called:.Shireen Felix    Who Left Message for Patient:Radha  Does the patient know what this is regarding?:no  Would the patient rather a call back or a response via [facility]? Call back  Best Call Back Number:..[phone]    Additional Information:

## 2022-04-22 NOTE — TELEPHONE ENCOUNTER
----- Message from Lizy Caballero sent at 4/22/2022  3:27 PM CDT -----  Contact: Shireen Iyer called regarding scheduling an appointment for lower back pain, please give her a call back at [phone] or [phone]        Mona peterson

## 2022-04-26 DIAGNOSIS — I25.118 CORONARY ARTERY DISEASE OF NATIVE ARTERY OF NATIVE HEART WITH STABLE ANGINA PECTORIS: Primary | ICD-10-CM

## 2022-04-26 DIAGNOSIS — E11.59 HYPERTENSION ASSOCIATED WITH DIABETES: ICD-10-CM

## 2022-04-26 DIAGNOSIS — I15.2 HYPERTENSION ASSOCIATED WITH DIABETES: ICD-10-CM

## 2022-04-29 ENCOUNTER — PATIENT MESSAGE (OUTPATIENT)
Dept: OPHTHALMOLOGY | Facility: CLINIC | Age: 77
End: 2022-04-29
Payer: MEDICARE

## 2022-05-03 ENCOUNTER — OFFICE VISIT (OUTPATIENT)
Dept: OPHTHALMOLOGY | Facility: CLINIC | Age: 77
End: 2022-05-03
Payer: MEDICARE

## 2022-05-03 ENCOUNTER — LAB VISIT (OUTPATIENT)
Dept: LAB | Facility: HOSPITAL | Age: 77
End: 2022-05-03
Attending: PEDIATRICS
Payer: MEDICARE

## 2022-05-03 DIAGNOSIS — N18.30 CONTROLLED TYPE 2 DIABETES MELLITUS WITH STAGE 3 CHRONIC KIDNEY DISEASE, WITHOUT LONG-TERM CURRENT USE OF INSULIN: ICD-10-CM

## 2022-05-03 DIAGNOSIS — Z96.1 PSEUDOPHAKIA OF BOTH EYES: ICD-10-CM

## 2022-05-03 DIAGNOSIS — E11.22 CONTROLLED TYPE 2 DIABETES MELLITUS WITH STAGE 3 CHRONIC KIDNEY DISEASE, WITHOUT LONG-TERM CURRENT USE OF INSULIN: ICD-10-CM

## 2022-05-03 DIAGNOSIS — H17.9 CORNEAL SCAR, LEFT EYE: ICD-10-CM

## 2022-05-03 DIAGNOSIS — H40.1132 PRIMARY OPEN ANGLE GLAUCOMA (POAG) OF BOTH EYES, MODERATE STAGE: Primary | ICD-10-CM

## 2022-05-03 LAB
ALBUMIN SERPL BCP-MCNC: 3.1 G/DL (ref 3.5–5.2)
ALP SERPL-CCNC: 56 U/L (ref 55–135)
ALT SERPL W/O P-5'-P-CCNC: 17 U/L (ref 10–44)
ANION GAP SERPL CALC-SCNC: 12 MMOL/L (ref 8–16)
AST SERPL-CCNC: 18 U/L (ref 10–40)
BILIRUB SERPL-MCNC: 0.3 MG/DL (ref 0.1–1)
BUN SERPL-MCNC: 19 MG/DL (ref 8–23)
CALCIUM SERPL-MCNC: 9.1 MG/DL (ref 8.7–10.5)
CHLORIDE SERPL-SCNC: 95 MMOL/L (ref 95–110)
CHOLEST SERPL-MCNC: 162 MG/DL (ref 120–199)
CHOLEST/HDLC SERPL: 2.3 {RATIO} (ref 2–5)
CO2 SERPL-SCNC: 20 MMOL/L (ref 23–29)
CREAT SERPL-MCNC: 0.8 MG/DL (ref 0.5–1.4)
EST. GFR  (AFRICAN AMERICAN): >60 ML/MIN/1.73 M^2
EST. GFR  (NON AFRICAN AMERICAN): >60 ML/MIN/1.73 M^2
ESTIMATED AVG GLUCOSE: 111 MG/DL (ref 68–131)
GLUCOSE SERPL-MCNC: 99 MG/DL (ref 70–110)
HBA1C MFR BLD: 5.5 % (ref 4–5.6)
HDLC SERPL-MCNC: 71 MG/DL (ref 40–75)
HDLC SERPL: 43.8 % (ref 20–50)
LDLC SERPL CALC-MCNC: 81.4 MG/DL (ref 63–159)
NONHDLC SERPL-MCNC: 91 MG/DL
POTASSIUM SERPL-SCNC: 3.7 MMOL/L (ref 3.5–5.1)
PROT SERPL-MCNC: 6.9 G/DL (ref 6–8.4)
SODIUM SERPL-SCNC: 127 MMOL/L (ref 136–145)
TRIGL SERPL-MCNC: 48 MG/DL (ref 30–150)

## 2022-05-03 PROCEDURE — 1159F MED LIST DOCD IN RCRD: CPT | Mod: CPTII,S$GLB,, | Performed by: OPHTHALMOLOGY

## 2022-05-03 PROCEDURE — 80053 COMPREHEN METABOLIC PANEL: CPT | Performed by: PEDIATRICS

## 2022-05-03 PROCEDURE — 1160F RVW MEDS BY RX/DR IN RCRD: CPT | Mod: CPTII,S$GLB,, | Performed by: OPHTHALMOLOGY

## 2022-05-03 PROCEDURE — 92133 POSTERIOR SEGMENT OCT OPTIC NERVE(OCULAR COHERENCE TOMOGRAPHY) - OU - BOTH EYES: ICD-10-PCS | Mod: S$GLB,,, | Performed by: OPHTHALMOLOGY

## 2022-05-03 PROCEDURE — 99999 PR PBB SHADOW E&M-EST. PATIENT-LVL IV: ICD-10-PCS | Mod: PBBFAC,,, | Performed by: OPHTHALMOLOGY

## 2022-05-03 PROCEDURE — 92133 CPTRZD OPH DX IMG PST SGM ON: CPT | Mod: S$GLB,,, | Performed by: OPHTHALMOLOGY

## 2022-05-03 PROCEDURE — 99999 PR PBB SHADOW E&M-EST. PATIENT-LVL IV: CPT | Mod: PBBFAC,,, | Performed by: OPHTHALMOLOGY

## 2022-05-03 PROCEDURE — 80061 LIPID PANEL: CPT | Performed by: PEDIATRICS

## 2022-05-03 PROCEDURE — 36415 COLL VENOUS BLD VENIPUNCTURE: CPT | Performed by: PEDIATRICS

## 2022-05-03 PROCEDURE — 99214 PR OFFICE/OUTPT VISIT, EST, LEVL IV, 30-39 MIN: ICD-10-PCS | Mod: S$GLB,,, | Performed by: OPHTHALMOLOGY

## 2022-05-03 PROCEDURE — 83036 HEMOGLOBIN GLYCOSYLATED A1C: CPT | Performed by: PEDIATRICS

## 2022-05-03 PROCEDURE — 1159F PR MEDICATION LIST DOCUMENTED IN MEDICAL RECORD: ICD-10-PCS | Mod: CPTII,S$GLB,, | Performed by: OPHTHALMOLOGY

## 2022-05-03 PROCEDURE — 99214 OFFICE O/P EST MOD 30 MIN: CPT | Mod: S$GLB,,, | Performed by: OPHTHALMOLOGY

## 2022-05-03 PROCEDURE — 1160F PR REVIEW ALL MEDS BY PRESCRIBER/CLIN PHARMACIST DOCUMENTED: ICD-10-PCS | Mod: CPTII,S$GLB,, | Performed by: OPHTHALMOLOGY

## 2022-05-03 NOTE — PROGRESS NOTES
SUBJECTIVE  Jetty Veronica Felix is 76 y.o. female  Corrected distance visual acuity was 20/30 +1 in the right eye and 20/40 in the left eye.   Chief Complaint   Patient presents with    Glaucoma     Pt here for 1m IOP GOCT Timolol QAM trial. Pt states that she has been experiencing some pressure behind her eyes, OS>OD. Pt says she has also been experiencing some itching in the nasal corner of her left eye. VA stable. 100% compliant with gtts.           HPI     Glaucoma      Additional comments: Pt here for 1m IOP GOCT Timolol QAM trial. Pt   states that she has been experiencing some pressure behind her eyes,   OS>OD. Pt says she has also been experiencing some itching in the nasal   corner of her left eye. VA stable. 100% compliant with gtts.               Comments     1. Mod COAG goal = 18-19 +Fhx coag-mother   Pt states she has had elevated pressures in the past.   2. 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 ERM OD  CR scar OD  4. DM-2005  5. PCIOL OU? -done in [place]  6. Dry Eyes  7. Refractive error PAL Rx    Latanoprost ou qhs  Timolol qam OU    AT's prn OU            Last edited by Adolph Rice MA on 5/3/2022  7:57 AM. (History)         Assessment /Plan :  1. Primary open angle glaucoma (POAG) of both eyes, moderate stage Doing well, IOP within acceptable range relative to target IOP and no evidence of progression. Continue current treatment. Reviewed importance of continued compliance with treatment and follow up.      Patient instructed to continue using the following glaucoma medication as follows:  Latanoprost one drop in each eye nightly and Timolol one drop both eyes daily    Return to clinic in 3-4 months  or as needed.  With IOP Check     2. Pseudophakia of both eyes  -- Condition stable, no therapeutic change required. Monitoring routinely.   3. Corneal scar, left eye

## 2022-05-04 ENCOUNTER — OFFICE VISIT (OUTPATIENT)
Dept: INTERNAL MEDICINE | Facility: CLINIC | Age: 77
End: 2022-05-04
Payer: MEDICARE

## 2022-05-04 VITALS
BODY MASS INDEX: 23.37 KG/M2 | HEART RATE: 76 BPM | OXYGEN SATURATION: 97 % | WEIGHT: 136.88 LBS | DIASTOLIC BLOOD PRESSURE: 80 MMHG | TEMPERATURE: 98 F | HEIGHT: 64 IN | SYSTOLIC BLOOD PRESSURE: 152 MMHG

## 2022-05-04 DIAGNOSIS — E11.59 HYPERTENSION ASSOCIATED WITH DIABETES: ICD-10-CM

## 2022-05-04 DIAGNOSIS — I10 PRIMARY HYPERTENSION: ICD-10-CM

## 2022-05-04 DIAGNOSIS — N18.30 CONTROLLED TYPE 2 DIABETES MELLITUS WITH STAGE 3 CHRONIC KIDNEY DISEASE, WITHOUT LONG-TERM CURRENT USE OF INSULIN: Primary | ICD-10-CM

## 2022-05-04 DIAGNOSIS — I25.10 CAD IN NATIVE ARTERY: ICD-10-CM

## 2022-05-04 DIAGNOSIS — E11.22 CONTROLLED TYPE 2 DIABETES MELLITUS WITH STAGE 3 CHRONIC KIDNEY DISEASE, WITHOUT LONG-TERM CURRENT USE OF INSULIN: Primary | ICD-10-CM

## 2022-05-04 DIAGNOSIS — I50.32 CHRONIC DIASTOLIC HEART FAILURE: ICD-10-CM

## 2022-05-04 DIAGNOSIS — K21.9 GASTROESOPHAGEAL REFLUX DISEASE, UNSPECIFIED WHETHER ESOPHAGITIS PRESENT: ICD-10-CM

## 2022-05-04 DIAGNOSIS — E78.2 MIXED HYPERLIPIDEMIA: ICD-10-CM

## 2022-05-04 DIAGNOSIS — E87.1 HYPONATREMIA: ICD-10-CM

## 2022-05-04 DIAGNOSIS — M54.12 CERVICAL RADICULOPATHY: ICD-10-CM

## 2022-05-04 DIAGNOSIS — F32.A DEPRESSION, UNSPECIFIED DEPRESSION TYPE: ICD-10-CM

## 2022-05-04 DIAGNOSIS — Z86.73 HISTORY OF STROKE: ICD-10-CM

## 2022-05-04 DIAGNOSIS — G89.29 CHRONIC LOW BACK PAIN WITH SCIATICA, SCIATICA LATERALITY UNSPECIFIED, UNSPECIFIED BACK PAIN LATERALITY: ICD-10-CM

## 2022-05-04 DIAGNOSIS — M54.40 CHRONIC LOW BACK PAIN WITH SCIATICA, SCIATICA LATERALITY UNSPECIFIED, UNSPECIFIED BACK PAIN LATERALITY: ICD-10-CM

## 2022-05-04 DIAGNOSIS — G40.109 TEMPORAL LOBE EPILEPSY: ICD-10-CM

## 2022-05-04 DIAGNOSIS — M81.0 AGE-RELATED OSTEOPOROSIS WITHOUT CURRENT PATHOLOGICAL FRACTURE: ICD-10-CM

## 2022-05-04 DIAGNOSIS — J41.1 MUCOPURULENT CHRONIC BRONCHITIS: Chronic | ICD-10-CM

## 2022-05-04 DIAGNOSIS — I15.2 HYPERTENSION ASSOCIATED WITH DIABETES: ICD-10-CM

## 2022-05-04 PROBLEM — U07.1 COVID: Status: RESOLVED | Noted: 2022-02-21 | Resolved: 2022-05-04

## 2022-05-04 PROCEDURE — 99214 OFFICE O/P EST MOD 30 MIN: CPT | Mod: S$GLB,,, | Performed by: PEDIATRICS

## 2022-05-04 PROCEDURE — 1160F PR REVIEW ALL MEDS BY PRESCRIBER/CLIN PHARMACIST DOCUMENTED: ICD-10-PCS | Mod: CPTII,S$GLB,, | Performed by: PEDIATRICS

## 2022-05-04 PROCEDURE — 3077F PR MOST RECENT SYSTOLIC BLOOD PRESSURE >= 140 MM HG: ICD-10-PCS | Mod: CPTII,S$GLB,, | Performed by: PEDIATRICS

## 2022-05-04 PROCEDURE — 99999 PR PBB SHADOW E&M-EST. PATIENT-LVL V: ICD-10-PCS | Mod: PBBFAC,,, | Performed by: PEDIATRICS

## 2022-05-04 PROCEDURE — 1101F PT FALLS ASSESS-DOCD LE1/YR: CPT | Mod: CPTII,S$GLB,, | Performed by: PEDIATRICS

## 2022-05-04 PROCEDURE — 3072F PR LOW RISK FOR RETINOPATHY: ICD-10-PCS | Mod: CPTII,S$GLB,, | Performed by: PEDIATRICS

## 2022-05-04 PROCEDURE — 3079F DIAST BP 80-89 MM HG: CPT | Mod: CPTII,S$GLB,, | Performed by: PEDIATRICS

## 2022-05-04 PROCEDURE — 1160F RVW MEDS BY RX/DR IN RCRD: CPT | Mod: CPTII,S$GLB,, | Performed by: PEDIATRICS

## 2022-05-04 PROCEDURE — 1125F PR PAIN SEVERITY QUANTIFIED, PAIN PRESENT: ICD-10-PCS | Mod: CPTII,S$GLB,, | Performed by: PEDIATRICS

## 2022-05-04 PROCEDURE — 1159F MED LIST DOCD IN RCRD: CPT | Mod: CPTII,S$GLB,, | Performed by: PEDIATRICS

## 2022-05-04 PROCEDURE — 3077F SYST BP >= 140 MM HG: CPT | Mod: CPTII,S$GLB,, | Performed by: PEDIATRICS

## 2022-05-04 PROCEDURE — 99999 PR PBB SHADOW E&M-EST. PATIENT-LVL V: CPT | Mod: PBBFAC,,, | Performed by: PEDIATRICS

## 2022-05-04 PROCEDURE — 1159F PR MEDICATION LIST DOCUMENTED IN MEDICAL RECORD: ICD-10-PCS | Mod: CPTII,S$GLB,, | Performed by: PEDIATRICS

## 2022-05-04 PROCEDURE — 99214 PR OFFICE/OUTPT VISIT, EST, LEVL IV, 30-39 MIN: ICD-10-PCS | Mod: S$GLB,,, | Performed by: PEDIATRICS

## 2022-05-04 PROCEDURE — 3288F PR FALLS RISK ASSESSMENT DOCUMENTED: ICD-10-PCS | Mod: CPTII,S$GLB,, | Performed by: PEDIATRICS

## 2022-05-04 PROCEDURE — 3079F PR MOST RECENT DIASTOLIC BLOOD PRESSURE 80-89 MM HG: ICD-10-PCS | Mod: CPTII,S$GLB,, | Performed by: PEDIATRICS

## 2022-05-04 PROCEDURE — 1125F AMNT PAIN NOTED PAIN PRSNT: CPT | Mod: CPTII,S$GLB,, | Performed by: PEDIATRICS

## 2022-05-04 PROCEDURE — 1101F PR PT FALLS ASSESS DOC 0-1 FALLS W/OUT INJ PAST YR: ICD-10-PCS | Mod: CPTII,S$GLB,, | Performed by: PEDIATRICS

## 2022-05-04 PROCEDURE — 3072F LOW RISK FOR RETINOPATHY: CPT | Mod: CPTII,S$GLB,, | Performed by: PEDIATRICS

## 2022-05-04 PROCEDURE — 3288F FALL RISK ASSESSMENT DOCD: CPT | Mod: CPTII,S$GLB,, | Performed by: PEDIATRICS

## 2022-05-04 NOTE — PROGRESS NOTES
Subjective:       Patient ID: Shireen Felix is a 76 y.o. female.    Chief Complaint: Follow-up    Shireen Felix is a 76 y.o. female who presents to clinic for a follow up. She was recently seen at the ER due to a reaction with a pain patch for her back pain.     1) DM: No BS checks. No hyper/hypoglycemic symptoms.   2) Seizures: none, followed by Neurology  3) Hx CVA: stable. Finished PT,  Now walking and no assistance with ambulation and no further fall risk. Living at her own home with close brother support, followed by Neurology  4) CAD/CHF with diastolic dysfunction: no CP, last Cardiology visit 1/31/22, has chronic SOB and TURCIOS unable to differaitiate between COPD  5) COPD: stable SOB/TURCIOS and coughing, future Pulm appt   6) GERD: quiet  7) Chronic pain and DJD: Always symptomatic, overall doing well despite daily pain, off narcotics. Is seeing PMR.   8) depression: off Lexapro, is mildly symptomatic and anxious    LABS REVIEWED AND DISCUSSED WITH PATIENT: CMP, lipid panel, and A1C     Review of Systems   Constitutional: Negative for activity change, appetite change, chills, diaphoresis, fatigue, fever and unexpected weight change.   HENT: Negative for nasal congestion, ear pain, mouth sores, nosebleeds, postnasal drip, rhinorrhea, sneezing and sore throat.    Eyes: Negative for photophobia, pain, discharge, redness and visual disturbance.   Respiratory: Negative for cough, chest tightness, shortness of breath, wheezing and stridor.    Cardiovascular: Negative for chest pain, palpitations and leg swelling.   Gastrointestinal: Negative for constipation, diarrhea, nausea and vomiting.   Genitourinary: Negative for decreased urine volume, difficulty urinating, dysuria, flank pain, frequency, hematuria and urgency.   Musculoskeletal: Negative for arthralgias, back pain, joint swelling, neck pain and neck stiffness.   Integumentary:  Negative for color change and rash.        chemical burn from adhesive  patch on R lower back   Neurological: Negative for dizziness, syncope, speech difficulty, weakness, light-headedness and headaches.   Hematological: Negative for adenopathy. Does not bruise/bleed easily.   Psychiatric/Behavioral: Negative for confusion, decreased concentration, dysphoric mood, hallucinations, sleep disturbance and suicidal ideas. The patient is not nervous/anxious.    All other systems reviewed and are negative.        Objective:      Physical Exam  Vitals and nursing note reviewed.   Constitutional:       General: She is not in acute distress.     Appearance: She is well-developed.   Neck:      Thyroid: No thyromegaly.      Vascular: No JVD.   Cardiovascular:      Rate and Rhythm: Normal rate and regular rhythm.      Heart sounds: Normal heart sounds. No murmur heard.  Pulmonary:      Effort: Pulmonary effort is normal. No respiratory distress.      Breath sounds: Normal breath sounds. No wheezing or rales.   Abdominal:      General: There is no distension.      Palpations: Abdomen is soft. There is no mass.      Tenderness: There is no abdominal tenderness. There is no guarding.   Musculoskeletal:      Right lower leg: No edema.      Left lower leg: No edema.   Lymphadenopathy:      Cervical: No cervical adenopathy.   Skin:     Capillary Refill: Capillary refill takes less than 2 seconds.      Findings: No rash.      Comments: 4x4cm healing second degree burn on R lower back   Neurological:      General: No focal deficit present.      Mental Status: She is alert and oriented to person, place, and time.      Cranial Nerves: No cranial nerve deficit.      Coordination: Coordination normal.   Psychiatric:         Mood and Affect: Mood normal.         Behavior: Behavior normal.         Thought Content: Thought content normal.         Judgment: Judgment normal.         Assessment:       Problem List Items Addressed This Visit     Mucopurulent chronic bronchitis (Chronic)    GERD (gastroesophageal  reflux disease)    HTN (hypertension)    DM (diabetes mellitus) type II controlled with renal manifestation - Primary    Relevant Orders    Hemoglobin A1C    Microalbumin/Creatinine Ratio, Urine    Lipid Panel    Comprehensive Metabolic Panel    CAD in native artery    History of stroke    Hypertension associated with diabetes    Hyponatremia    Relevant Orders    Basic Metabolic Panel    Chronic back pain    Age-related osteoporosis without current pathological fracture    Cervical radiculopathy    Depression    Mixed hyperlipidemia    Temporal lobe epilepsy    Chronic diastolic heart failure          Plan:     Controlled type 2 diabetes mellitus with stage 3 chronic kidney disease, without long-term current use of insulin  -     Hemoglobin A1C; Future; Expected date: 05/04/2022  -     Microalbumin/Creatinine Ratio, Urine; Future; Expected date: 05/04/2022  -     Lipid Panel; Future; Expected date: 05/04/2022  -     Comprehensive Metabolic Panel; Future; Expected date: 05/04/2022    Gastroesophageal reflux disease, unspecified whether esophagitis present    Age-related osteoporosis without current pathological fracture    CAD in native artery    Cervical radiculopathy    Chronic low back pain with sciatica, sciatica laterality unspecified, unspecified back pain laterality    Chronic diastolic heart failure    Depression, unspecified depression type    History of stroke    Primary hypertension    Hypertension associated with diabetes    Hyponatremia  -     Basic Metabolic Panel; Future; Expected date: 05/04/2022    Mixed hyperlipidemia    Mucopurulent chronic bronchitis    Temporal lobe epilepsy    Her DM, lipids are under control, B/P likely high due to pain today, continue to follow b/p at home. Her sodium varies, likely due to respiratory and pain med status, will repeat next week. Her chronic pain and COPD are her biggest and poorest controlled, hard to assess actual cardiac status due to pulmonary limitations.  Maintain meds and her subspecialty care. F/U q 6 months  Scribe Attestation:   I, Ben Espinoza, am scribing for, and in the presence of, Dr. Laron Chaudhari Jr. I performed the above scribed service and the documentation accurately describes the services I performed. I attest to the accuracy of the note.    I, Dr. Laron Chaudhari Jr, reviewed documentation as scribed above. I personally performed the services described in this documentation.  I agree that the record reflects my personal performance and is accurate and complete. Laron Chaudhari Jr., MD.  05/04/2022

## 2022-05-06 ENCOUNTER — INFUSION (OUTPATIENT)
Dept: INFUSION THERAPY | Facility: HOSPITAL | Age: 77
End: 2022-05-06
Attending: INTERNAL MEDICINE
Payer: MEDICARE

## 2022-05-06 ENCOUNTER — IMMUNIZATION (OUTPATIENT)
Dept: PHARMACY | Facility: CLINIC | Age: 77
End: 2022-05-06
Payer: MEDICARE

## 2022-05-06 ENCOUNTER — OFFICE VISIT (OUTPATIENT)
Dept: PAIN MEDICINE | Facility: CLINIC | Age: 77
End: 2022-05-06
Payer: MEDICARE

## 2022-05-06 ENCOUNTER — OFFICE VISIT (OUTPATIENT)
Dept: RHEUMATOLOGY | Facility: CLINIC | Age: 77
End: 2022-05-06
Payer: MEDICARE

## 2022-05-06 VITALS
HEART RATE: 85 BPM | SYSTOLIC BLOOD PRESSURE: 143 MMHG | WEIGHT: 136 LBS | DIASTOLIC BLOOD PRESSURE: 68 MMHG | RESPIRATION RATE: 17 BRPM | BODY MASS INDEX: 23.22 KG/M2 | HEIGHT: 64 IN

## 2022-05-06 VITALS
BODY MASS INDEX: 23.29 KG/M2 | WEIGHT: 136.44 LBS | DIASTOLIC BLOOD PRESSURE: 78 MMHG | HEIGHT: 64 IN | SYSTOLIC BLOOD PRESSURE: 144 MMHG | HEART RATE: 78 BPM

## 2022-05-06 DIAGNOSIS — M15.9 PRIMARY OSTEOARTHRITIS INVOLVING MULTIPLE JOINTS: ICD-10-CM

## 2022-05-06 DIAGNOSIS — Z23 NEED FOR VACCINATION: Primary | ICD-10-CM

## 2022-05-06 DIAGNOSIS — M47.812 CERVICAL SPONDYLOSIS: Primary | ICD-10-CM

## 2022-05-06 DIAGNOSIS — Z71.89 COUNSELING ON HEALTH PROMOTION AND DISEASE PREVENTION: ICD-10-CM

## 2022-05-06 DIAGNOSIS — M81.0 AGE-RELATED OSTEOPOROSIS WITHOUT CURRENT PATHOLOGICAL FRACTURE: Primary | ICD-10-CM

## 2022-05-06 DIAGNOSIS — M47.816 LUMBAR SPONDYLOSIS: ICD-10-CM

## 2022-05-06 DIAGNOSIS — M47.812 CERVICAL FACET JOINT SYNDROME: ICD-10-CM

## 2022-05-06 DIAGNOSIS — M47.816 LUMBAR FACET ARTHROPATHY: ICD-10-CM

## 2022-05-06 DIAGNOSIS — M53.3 SACROILIAC JOINT PAIN: ICD-10-CM

## 2022-05-06 PROCEDURE — 99999 PR PBB SHADOW E&M-EST. PATIENT-LVL V: CPT | Mod: PBBFAC,,, | Performed by: ANESTHESIOLOGY

## 2022-05-06 PROCEDURE — 63600175 PHARM REV CODE 636 W HCPCS: Mod: JG | Performed by: INTERNAL MEDICINE

## 2022-05-06 PROCEDURE — 99214 PR OFFICE/OUTPT VISIT, EST, LEVL IV, 30-39 MIN: ICD-10-PCS | Mod: S$GLB,,, | Performed by: ANESTHESIOLOGY

## 2022-05-06 PROCEDURE — 1159F PR MEDICATION LIST DOCUMENTED IN MEDICAL RECORD: ICD-10-PCS | Mod: CPTII,S$GLB,, | Performed by: INTERNAL MEDICINE

## 2022-05-06 PROCEDURE — 3078F PR MOST RECENT DIASTOLIC BLOOD PRESSURE < 80 MM HG: ICD-10-PCS | Mod: CPTII,S$GLB,, | Performed by: ANESTHESIOLOGY

## 2022-05-06 PROCEDURE — 3077F PR MOST RECENT SYSTOLIC BLOOD PRESSURE >= 140 MM HG: ICD-10-PCS | Mod: CPTII,S$GLB,, | Performed by: ANESTHESIOLOGY

## 2022-05-06 PROCEDURE — 1101F PT FALLS ASSESS-DOCD LE1/YR: CPT | Mod: CPTII,S$GLB,, | Performed by: ANESTHESIOLOGY

## 2022-05-06 PROCEDURE — 1159F PR MEDICATION LIST DOCUMENTED IN MEDICAL RECORD: ICD-10-PCS | Mod: CPTII,S$GLB,, | Performed by: ANESTHESIOLOGY

## 2022-05-06 PROCEDURE — 1125F AMNT PAIN NOTED PAIN PRSNT: CPT | Mod: CPTII,S$GLB,, | Performed by: INTERNAL MEDICINE

## 2022-05-06 PROCEDURE — 99214 PR OFFICE/OUTPT VISIT, EST, LEVL IV, 30-39 MIN: ICD-10-PCS | Mod: 25,S$GLB,, | Performed by: INTERNAL MEDICINE

## 2022-05-06 PROCEDURE — 3072F LOW RISK FOR RETINOPATHY: CPT | Mod: CPTII,S$GLB,, | Performed by: INTERNAL MEDICINE

## 2022-05-06 PROCEDURE — 1159F MED LIST DOCD IN RCRD: CPT | Mod: CPTII,S$GLB,, | Performed by: INTERNAL MEDICINE

## 2022-05-06 PROCEDURE — 99215 OFFICE O/P EST HI 40 MIN: CPT | Mod: PBBFAC | Performed by: INTERNAL MEDICINE

## 2022-05-06 PROCEDURE — 99999 PR PBB SHADOW E&M-EST. PATIENT-LVL V: CPT | Mod: PBBFAC,,, | Performed by: INTERNAL MEDICINE

## 2022-05-06 PROCEDURE — 3077F SYST BP >= 140 MM HG: CPT | Mod: CPTII,S$GLB,, | Performed by: ANESTHESIOLOGY

## 2022-05-06 PROCEDURE — 20610 LARGE JOINT ASPIRATION/INJECTION: R SUBACROMIAL BURSA: ICD-10-PCS | Mod: RT,S$GLB,, | Performed by: INTERNAL MEDICINE

## 2022-05-06 PROCEDURE — 1125F AMNT PAIN NOTED PAIN PRSNT: CPT | Mod: CPTII,S$GLB,, | Performed by: ANESTHESIOLOGY

## 2022-05-06 PROCEDURE — 1125F PR PAIN SEVERITY QUANTIFIED, PAIN PRESENT: ICD-10-PCS | Mod: CPTII,S$GLB,, | Performed by: INTERNAL MEDICINE

## 2022-05-06 PROCEDURE — 99214 OFFICE O/P EST MOD 30 MIN: CPT | Mod: 25,S$GLB,, | Performed by: INTERNAL MEDICINE

## 2022-05-06 PROCEDURE — 1100F PTFALLS ASSESS-DOCD GE2>/YR: CPT | Mod: CPTII,S$GLB,, | Performed by: INTERNAL MEDICINE

## 2022-05-06 PROCEDURE — 99999 PR PBB SHADOW E&M-EST. PATIENT-LVL V: ICD-10-PCS | Mod: PBBFAC,,, | Performed by: ANESTHESIOLOGY

## 2022-05-06 PROCEDURE — 96372 THER/PROPH/DIAG INJ SC/IM: CPT

## 2022-05-06 PROCEDURE — 1160F RVW MEDS BY RX/DR IN RCRD: CPT | Mod: CPTII,S$GLB,, | Performed by: ANESTHESIOLOGY

## 2022-05-06 PROCEDURE — 20610 DRAIN/INJ JOINT/BURSA W/O US: CPT | Mod: RT,S$GLB,, | Performed by: INTERNAL MEDICINE

## 2022-05-06 PROCEDURE — 1160F PR REVIEW ALL MEDS BY PRESCRIBER/CLIN PHARMACIST DOCUMENTED: ICD-10-PCS | Mod: CPTII,S$GLB,, | Performed by: ANESTHESIOLOGY

## 2022-05-06 PROCEDURE — 1159F MED LIST DOCD IN RCRD: CPT | Mod: CPTII,S$GLB,, | Performed by: ANESTHESIOLOGY

## 2022-05-06 PROCEDURE — 3072F PR LOW RISK FOR RETINOPATHY: ICD-10-PCS | Mod: CPTII,S$GLB,, | Performed by: ANESTHESIOLOGY

## 2022-05-06 PROCEDURE — 3288F FALL RISK ASSESSMENT DOCD: CPT | Mod: CPTII,S$GLB,, | Performed by: INTERNAL MEDICINE

## 2022-05-06 PROCEDURE — 3072F PR LOW RISK FOR RETINOPATHY: ICD-10-PCS | Mod: CPTII,S$GLB,, | Performed by: INTERNAL MEDICINE

## 2022-05-06 PROCEDURE — 1101F PR PT FALLS ASSESS DOC 0-1 FALLS W/OUT INJ PAST YR: ICD-10-PCS | Mod: CPTII,S$GLB,, | Performed by: ANESTHESIOLOGY

## 2022-05-06 PROCEDURE — 3078F DIAST BP <80 MM HG: CPT | Mod: CPTII,S$GLB,, | Performed by: ANESTHESIOLOGY

## 2022-05-06 PROCEDURE — 99999 PR PBB SHADOW E&M-EST. PATIENT-LVL V: ICD-10-PCS | Mod: PBBFAC,,, | Performed by: INTERNAL MEDICINE

## 2022-05-06 PROCEDURE — 1100F PR PT FALLS ASSESS DOC 2+ FALLS/FALL W/INJURY/YR: ICD-10-PCS | Mod: CPTII,S$GLB,, | Performed by: INTERNAL MEDICINE

## 2022-05-06 PROCEDURE — 99214 OFFICE O/P EST MOD 30 MIN: CPT | Mod: S$GLB,,, | Performed by: ANESTHESIOLOGY

## 2022-05-06 PROCEDURE — 3288F PR FALLS RISK ASSESSMENT DOCUMENTED: ICD-10-PCS | Mod: CPTII,S$GLB,, | Performed by: ANESTHESIOLOGY

## 2022-05-06 PROCEDURE — 3288F FALL RISK ASSESSMENT DOCD: CPT | Mod: CPTII,S$GLB,, | Performed by: ANESTHESIOLOGY

## 2022-05-06 PROCEDURE — 3072F LOW RISK FOR RETINOPATHY: CPT | Mod: CPTII,S$GLB,, | Performed by: ANESTHESIOLOGY

## 2022-05-06 PROCEDURE — 1125F PR PAIN SEVERITY QUANTIFIED, PAIN PRESENT: ICD-10-PCS | Mod: CPTII,S$GLB,, | Performed by: ANESTHESIOLOGY

## 2022-05-06 PROCEDURE — 3288F PR FALLS RISK ASSESSMENT DOCUMENTED: ICD-10-PCS | Mod: CPTII,S$GLB,, | Performed by: INTERNAL MEDICINE

## 2022-05-06 RX ORDER — TRIAMCINOLONE ACETONIDE 40 MG/ML
40 INJECTION, SUSPENSION INTRA-ARTICULAR; INTRAMUSCULAR
Status: DISCONTINUED | OUTPATIENT
Start: 2022-05-06 | End: 2022-05-06 | Stop reason: HOSPADM

## 2022-05-06 RX ORDER — TRAMADOL HYDROCHLORIDE 50 MG/1
50 TABLET ORAL EVERY 8 HOURS PRN
Qty: 21 TABLET | Refills: 0 | Status: SHIPPED | OUTPATIENT
Start: 2022-05-06 | End: 2022-06-05

## 2022-05-06 RX ADMIN — TRIAMCINOLONE ACETONIDE 40 MG: 40 INJECTION, SUSPENSION INTRA-ARTICULAR; INTRAMUSCULAR at 12:05

## 2022-05-06 RX ADMIN — DENOSUMAB 60 MG: 60 INJECTION SUBCUTANEOUS at 01:05

## 2022-05-06 NOTE — PROGRESS NOTES
Chief Pain Complaint:  Neck Pain   Lumbar Back Pain   Facial Pain       History of Present Illness:   Shireen Felix is a 76 y.o. female  who is presenting with a chief complaint of Neck Pain. The patient began experiencing this problem insidiously, and the pain has been gradually worsening over the past 6 month(s). The pain is described as throbbing, cramping, aching and heavy and is located in the bilateral cervical spine. Pain is intermittent and lasts hours. The  pain is nonradiating. The patient rates her pain a 7 out of ten and interferes with activities of daily living a 7 out of ten. Pain is exacerbated by rotation of the cervical spine, and is improved by rest. Patient reports no prior trauma, no prior spinal surgery     This patient is a 74 y.o. female who presents today complaining of the above noted pain/s. The patient describes the pain as follows.  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  She reports approximately 2 years ago she had to sudden deaths in the family which caused very stressful time for her and resulted in shingles rash in the left V1 distribution however she reports having excruciating pain in the left V1 and V2 distributions.  She denies having difficulty with eating food and brushing her teeth on the left side of her face however the left lateral side of her nose gets her excruciating pain.  She has been tried on numerous medications in the past including gabapentin, Lyrica, Valtrex, Celebrex, ibuprofen which have all provided minimal benefit however steroids have been most helpful.  Today she rates her pain as an 4/10 and describes a constant burning sensation the left side of her face in addition to radiation into her bilateral lower extremities, her right shoulder, her left upper extremity occasionally as a pins and needle sensation.  She  does report having numbness and weakness in her left lower extremity.  She has been physical therapy which she completed in February of 2019 however this caused most of her low back and leg symptoms to worsen in addition to her post herpetic neuralgia to worsen.  She denies having bowel bladder difficulties.  Her symptoms are worse with activity such as walking in her somewhat improved with rest however she had finds it difficult to get into a comfortable position. She has been using topical lidocaine patches and applying to the left side of her face which does provide significant benefit.  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    Shireen Felix is a 76 y.o. female  who is presenting with a chief complaint of lumbar spine. The patient began experiencing this problem insidiously, and the pain has been gradually worsening over the past 2 year(s). The pain is described as throbbing, shooting, burning, aching and electrical and is located in the bilateral lumbar spine. Pain is intermittent and lasts hours. The pain radiates to bilateral lower extremities. The patient rates her pain a 7 out of ten and interferes with activities of daily living a 6 out of ten. Pain is exacerbated by flexion of the lumbar spine, and is improved by rest. Patient reports no prior trauma, no prior spinal surgery       - pertinent negatives: No fever, No chills, No weight loss, No bladder dysfunction, No bowel dysfunction, No saddle anesthesia  - pertinent positives: generalized nonspecific Lower Extremity weakness bilaterally    - medications, other therapies tried (physical therapy, injections):     >> NSAIDs, Tylenol, Tramadol, Norco, gabapentin, lyrica, flexeril and medrol dose pack    >> Has previously undergone Physical Therapy    >> Has previously undergone spinal injection/s           Bilateral L3/4 TF XANDER on 8/4/19 with 75% pain relief, bilateral L4/5 transforaminal epidural steroid injections with 80% symptomatic pain relief, bilateral L3-5 lumbar medial branch blocks with 100% symptomatic pain relief, T12-L1 transforaminal epidural steroid injection with 100% symptomatic pain relief         Right SI Right GTB injection on 11/16/21 with 80% relief      Imaging / Labs / Studies (reviewed on 5/6/2022):    Results for orders placed during the hospital encounter of 02/06/20    CT Lumbar Spine Without Contrast    Narrative  EXAMINATION:  CT LUMBAR SPINE WITHOUT CONTRAST    CLINICAL HISTORY:  Low back painLow back pain, >6wks conservative tx, persistent-progressive sx, surgical candidate;    FINDINGS:  The lumbar vertebral bodies demonstrate adequate alignment.Disc space narrowing from T11 through S1.No acute fractures are identified.    T11-T12: Disc space narrowing.  Broad-based disc bulge.  Old vertebral endplate fracture of T12.    T12-L1: Small disc protrusion paramedian to the right side.  Degenerative changes of the vertebral endplates and facet joints.    L1-L2: Old fracture of the inferior endplate of L1.  Mild posterior displacement of a fracture fragment minimally narrowing the AP diameter of the canal.  Gas in the disc space consistent with degenerating disc material.    L2-L3: Circumferential disc bulge.  Bilateral degenerative changes of the facets.  Hypertrophy of the ligamentum flavum.  Moderate to severe central spinal stenosis.  Bony neural foraminal narrowing without definite nerve root impingement.    L3-L4: Circumferential disc bulge.  Bilateral degenerative changes of the facets.  Degenerative changes of the vertebral endplates.  Severe central spinal stenosis.  Hypertrophy of the ligamentum flavum and degenerative changes of the facets.    L4-L5: Circumferential disc bulge.  Severe central spinal stenosis.  Bilateral degenerative changes of the facets.  Bilateral bony neural  foraminal narrowing and possible bilateral L4 nerve root impingement.    L5-S1: Circumferential disc bulge.  Gas formation within the disc.  Moderate central spinal stenosis.  Bilateral degenerative changes of the facets.  Bilateral bony neural foraminal narrowing with possible bilateral L5 nerve root impingement.    Atherosclerotic abdominal aorta without aneurysmal dilatation.    Impression  Mild lumbar scoliotic curvature with multilevel degenerative changes as described above.  Disc space narrowings are seen most significantly at the L3-4 and L4-5 levels.  Bilateral bony neural foraminal narrowings are seen most significantly at the L3-4 and L4-5 levels.  Neural foraminal narrowing is also significantly identified on the left side at the L5-S1 level with possible left-sided L5 nerve root impingement.  Other degenerative changes as described above.  Evidence of an old L1 compression fracture with a healed fragment of the inferior posterior L1 vertebra mildly impinging on the ventral thecal sac.    Comparison to the prior study dated 08/06/2019 shows that the L1 fracture occurred in the interval.    All CT scans at this facility are performed  using dose modulation techniques as appropriate to performed exam including the following:  automated exposure control; adjustment of mA and/or kV according to the patients size (this includes techniques or standardized protocols for targeted exams where dose is matched to indication/reason for exam: i.e. extremities or head);  iterative reconstruction technique.      Electronically signed by: Brody Kumari MD  Date:    02/06/2020  Time:    08:28    Results for orders placed during the hospital encounter of 07/31/19    CT Cervical Spine Without Contrast    Narrative  EXAMINATION:  CT CERVICAL SPINE WITHOUT CONTRAST    CLINICAL HISTORY:  C-spine trauma, NEXUS/CCR positive, +risk factor(s);    TECHNIQUE:  Contiguous axial CT images were obtained of the cervical spine without  intravenous contrast. Coronal and sagittal reformations were acquired.    COMPARISON:  None    FINDINGS:  Osteopenia.  Grade 2 degenerative spondylolisthesis at C3-C4.  Grade 1 degenerative spondylolisthesis at C4-C5.  Vertebral body height is normal.  No acute fractures. No prevertebral soft tissue swelling. Atlanto-occipital articulation is normal.  Congenitally patulous spinal canal.  No significant spinal stenosis.  Moderate left foraminal stenosis at C3-C4.    Impression  No acute findings.  Degenerative spondylolisthesis at C3-C4 and C4-C5.    All CT scans at this facility use dose modulation, iterative reconstruction, and/or weight base dosing when appropriate to reduce radiation dose to as low as reasonably achievable.      Electronically signed by: Zeke Mccarthy Jr., MD  Date:    07/31/2019  Time:    12:32      Results for orders placed during the hospital encounter of 10/31/19    X-Ray Lumbar Complete With Flex And Ext    Narrative  EXAMINATION:  XR LUMBAR SPINE 5 VIEW WITH FLEX AND EXT    CLINICAL HISTORY:  low back pain;  Pain in right hip    TECHNIQUE:  Five views of the lumbar spine plus flexion extension views were performed.    COMPARISON:  06/04/2019    FINDINGS:  Mild levoscoliosis of the lumbar spine is unchanged.  Chronic T12 compression fracture deformity appears unchanged.  No definite spondylolisthesis demonstrated.  No change in spinal alignment with flexion or extension to suggest instability.  Multilevel disc height loss again noted and unchanged, remaining most severe from L3-4 through L5-S1.  No pars defects visualized.  Posterior elements appear grossly intact. No acute fractures or subluxations are demonstrated.  Visualized osseous structures appear diffusely osteopenic.  Prior bilateral hip arthroplasties noted.    Impression  Unchanged appearance of the lumbar spine as above.      Electronically signed by: Neri Husain MD  Date:    10/31/2019  Time:    12:57    Results for orders  placed during the hospital encounter of 01/23/20    X-Ray Lumbar Spine Ap And Lateral    Narrative  EXAMINATION:  XR LUMBAR SPINE AP AND LATERAL    CLINICAL HISTORY:  Low back pain, >6wks conservative tx, persistent-progressive sx, surgical candidate;Low back pain    TECHNIQUE:  AP, lateral and spot images were performed of the lumbar spine.    COMPARISON:  10/31/2019    FINDINGS:  Levoscoliosis present.  Vertebral body heights stable.  Alignment unchanged without spondylolisthesis.  Similar appearing multilevel degenerative disc height loss and osteophyte findings noted.  Multilevel facet degenerative findings remain.  Aorta iliac atherosclerotic calcification.  Colonic constipation findings present.    Impression  Similar appearance of the lumbar spine with prominent multilevel degenerative findings.  Constipation.      Electronically signed by: Davy Gutierrez MD  Date:    01/23/2020  Time:    15:36    Results for orders placed during the hospital encounter of 06/04/19    X-Ray Cervical Spine AP And Lateral    Narrative  EXAMINATION:  XR CERVICAL SPINE AP LATERAL    CLINICAL HISTORY:  Spondylosis without myelopathy or radiculopathy, cervical region    TECHNIQUE:  AP, lateral and open mouth views of the cervical spine were performed.    COMPARISON:  None.    FINDINGS:  Reversal normal C-spine curvature noted on the lateral view with visualization to the C7-T1 level.  Minimal anterior subluxation C4 on C5 noted.  There is multilevel marginal spurring and varying degrees of loss of disc height throughout the C-spine.  No acute fracture identified in prevertebral soft tissues, C1-2 articulation and odontoid appear within normal limits allowing for positioning.  Minimal vascular calcification identified on the left in the area of the carotid vessels.    Impression  Multilevel cervical spondylosis with straightening of the normal C-spine curvature and minimal anterior subluxation C4 on C5.    No acute  "fracture.    Follow-up and/or further evaluation as warranted.      Electronically signed by: Mario Flanagan MD  Date:    06/05/2019  Time:    07:51      Review of Systems:  CONSTITUTIONAL: patient denies any fever, chills, or weight loss  SKIN: patient denies any rash or itching  RESPIRATORY: patient denies having any shortness of breath  GASTROINTESTINAL: patient denies having any diarrhea, constipation, or bowel incontinence  GENITOURINARY: patient denies having any abnormal bladder function    MUSCULOSKELETAL:  - patient complains of the above noted pain/s (see chief pain complaint)    NEUROLOGICAL:   - pain as above  - strength in Upper and Lower extremities is decreased, BILATERALLY  - sensation in Upper extremities is intact, BILATERALLY  - patient denies any loss of bowel or bladder control      PSYCHIATRIC: patient denies any change in mood    Other:  All other systems reviewed and are negative      Physical Exam:  BP (!) 143/68   Pulse 85   Resp 17   Ht 5' 4" (1.626 m)   Wt 61.7 kg (136 lb)   LMP  (LMP Unknown)   BMI 23.34 kg/m²  (reviewed on 5/6/2022)  General: Alert and oriented, in no apparent distress.  Gait: normal gait.  Skin: No rashes, No discoloration, No obvious lesions  HEENT: Normocephalic, atraumatic. Pupils equal and round.  Cardiovascular: Regular rate and rhythm , no significant peripheral edema present  Respiratory: Without audible wheezing, without use of accessory muscles of respiration.    Musculoskeletal:    Cervical Spine    - Pain on flexion of cervical spine Absent  - Spurling's Test:  Absent    - Pain on extension of cervical spine Present  - TTP over the cervical facet joints Present bilateral C6-7  - Cervical facet loading Present    -TTP over bilateral rhomboids       Lumbar Spine    - Pain on flexion of lumbar spine Present  - Straight Leg Raise:  Equivocal    - Pain on extension of lumbar spine Present  - TTP over the lumbar facet joints Present bilateral L5-S1   - Lumbar " facet loading Present    -Pain on palpation over the SI joint  Absent  - CARL: Absent      Neuro:    Strength:  UE R/L: D: 5/5; B: 5/5; T: 4/4; WF: 5/5; WE: 5/5; IO: 5/5;  LE R/L: HF: 4/4, HE: 4/4, KF: 4/4; KE: 4/4; FE: 5/5; FF: 5/5    Extremity Reflexes: Brisk and symmetric throughout.      Extremity Sensory: Sensation to pinprick and temperature symmetric. Proprioception intact.      Psych:  Mood and affect is appropriate      Assessment:    Shireen Felix is a 76 y.o. year old female who is presenting with   Encounter Diagnoses   Name Primary?    Cervical spondylosis Yes    Lumbar facet arthropathy     Sacroiliac joint pain     Lumbar spondylosis     Cervical facet joint syndrome        Plan:    1. Interventional: Patient with healing skin ulcer will not do trigger point injection at this visit.      Consider Right then Left L3, 4,5 MBB RFA. Patient will call to schedule.       S/p Right SI Right GTB injection on 11/16/21 with 80% relief.      Consider bilateral C5, 6,7 MBB.     2. Pharmacologic: Tramadol 50 mg Po TID PRN (21tabs).  checked and appropriate. Continue Tizanidine 4 mg PO BID PRN. Consider increasing Gabapentin from 100 mg PO BID.     3. Rehabilitative: Encouraged ambulation.    4. Diagnostic: Cervical CT reviewed.     5. Consult: Patient is seeing Neurosurgery who proposed posterior decompression/fusion of lumbar spine but she is not wanting surgery at this time.       6. Follow up: 2 weeks.     Best wishes,    Signed:    Yao Fulton MD          Disclaimer:  This note may have been prepared using voice recognition software, it may have not been extensively proofed, as such there could be errors within the text such as sound alike errors.

## 2022-05-06 NOTE — PROGRESS NOTES
RHEUMATOLOGY OUTPATIENT CLINIC NOTE    5/6/2022    Attending Rheumatologist: Julien Hernnadez  Primary Care Provider/Physician Requesting Consultation: POOL Chaudhari Jr, MD   Chief Complaint/Reason For Consultation:  Hand Pain      Subjective:     Shireen Felix is a 76 y.o. White female with osteoporosis for follow up visit.    No acute complaints.  Chronic generalized arthralgias and back pain with mechanical pattern.  No associated alarm signs or symptoms.  Denies falls or fractures since last encounter.     Review of Systems   Constitutional: Negative for fever.   Musculoskeletal: Positive for back pain, falls (Earlier this year, resulting on left foot fracture) and joint pain.   Skin: Negative for rash.   Neurological: Negative for focal weakness.       Chronic comorbid conditions affecting medical decision making today:  Past Medical History:   Diagnosis Date    Abnormal ECG 8/5/2019    Aneurysm     Anticoagulant long-term use     Arthritis     CAD in native artery 8/5/2019    COPD (chronic obstructive pulmonary disease)     Diabetes mellitus     Fall 10/10/2019    Formatting of this note might be different from the original. Found sitting on floor next to bed last night Mild confusion today Does not recall falling or how she ended up on floor UA today Labs yesterday unremarkable    Glaucoma     Hypertension     Seizures     Shingles 05/27/2017    Stroke      Past Surgical History:   Procedure Laterality Date    BRAIN SURGERY      CARDIAC CATHETERIZATION      CORONARY ANGIOPLASTY      EPIDURAL STEROID INJECTION INTO LUMBAR SPINE Bilateral 11/12/2019    Procedure: TF XANDER L4/5;  Surgeon: Desean Dias MD;  Location: [facility];  Service: Pain Management;  Laterality: Bilateral;    HYSTERECTOMY      INJECTION OF ANESTHETIC AGENT AROUND MEDIAL BRANCH NERVES INNERVATING LUMBAR FACET JOINT Bilateral 1/31/2020    Procedure: Bilateral L3-5 MBB;  Surgeon: Desean Dias MD;  [facility];  Service: Pain Management;  Laterality: Bilateral;    INJECTION OF ANESTHETIC AGENT INTO SACROILIAC JOINT Right 2021    Procedure: Right BLOCK, SACROILIAC JOINT Right GTB with RN IV sedation;  Surgeon: Yao Fulton MD;  Location: [facility];  Service: Pain Management;  Laterality: Right;    INJECTION OF JOINT Bilateral 2020    Procedure: Bilateral shoulder GH Joint injection with local;  Surgeon: Desean Dias MD;  Location: [facility];  Service: Pain Management;  Laterality: Bilateral;    TRANSFORAMINAL EPIDURAL INJECTION OF STEROID Bilateral 2019    Procedure: Bilateral L3/4 Transforaminal Epidural Steroid Injection;  Surgeon: Desean Dias MD;  Location: [facility];  Service: Pain Management;  Laterality: Bilateral;    TRANSFORAMINAL EPIDURAL INJECTION OF STEROID Bilateral 3/10/2020    Procedure: Right T12/L1 TF XANDER with local;  Surgeon: Desean Dias MD;  Location: [facility];  Service: Pain Management;  Laterality: Bilateral;    TRANSFORAMINAL EPIDURAL INJECTION OF STEROID Right 2020    Procedure: Right T12/L1 TFESI Covid day of procedure;  Surgeon: Desean Dias MD;  Location: [facility];  Service: Pain Management;  Laterality: Right;     Family History   Problem Relation Age of Onset    No Known Problems Mother     No Known Problems Father      Social History     Substance and Sexual Activity   Alcohol Use No     Social History     Tobacco Use   Smoking Status Former Smoker    Packs/day: 1.00    Years: 10.00    Pack years: 10.00    Types: Cigarettes    Quit date: 2004    Years since quittin.0   Smokeless Tobacco Never Used     Social History     Substance and Sexual Activity   Drug Use Never       Current Outpatient Medications:     acetaminophen (TYLENOL) 500 MG tablet, Take 500 mg by mouth as needed for Pain., Disp: , Rfl:     albuterol (PROVENTIL) 2.5 mg /3 mL (0.083 %) nebulizer solution, Take 3 mLs (2.5 mg total) by  nebulization every 4 (four) hours as needed. Rescue, Disp: 150 mL, Rfl: 11    albuterol (PROVENTIL) 2.5 mg /3 mL (0.083 %) nebulizer solution, Take 2.5 mg by nebulization every 4 (four) hours as needed for Wheezing. Rescue, Disp: , Rfl:     albuterol (PROVENTIL/VENTOLIN HFA) 90 mcg/actuation inhaler, INHALE 2 PUFFS INTO THE LUNGS EVERY 4 HOURS AS NEEDED, Disp: 18 g, Rfl: 11    amLODIPine (NORVASC) 10 MG tablet, Take 1 tablet (10 mg total) by mouth once daily., Disp: 90 tablet, Rfl: 3    APTIOM 400 mg Tab tablet, TAKE 1 TABLET BY MOUTH EVERY EVENING WITH 800MG TABLET, Disp: 30 tablet, Rfl: 2    APTIOM 800 mg Tab, TAKE 1 TABLET BY MOUTH EVERY EVENING WITH 400MG TABLET, Disp: 30 tablet, Rfl: 2    atorvastatin (LIPITOR) 40 MG tablet, TAKE 1 TABLET BY MOUTH ONCE DAILY, Disp: 90 tablet, Rfl: 1    azithromycin (Z-EMEKA) 250 MG tablet, Take 2 tablets by mouth on day 1; Take 1 tablet by mouth on days 2-5, Disp: 6 tablet, Rfl: 0    cholecalciferol, vitamin D3, 1,250 mcg (50,000 unit) capsule, Take 1 capsule (50,000 Units total) by mouth every 7 days., Disp: 12 capsule, Rfl: 0    clopidogreL (PLAVIX) 75 mg tablet, TAKE 1 TABLET BY MOUTH ONCE DAILY, Disp: 90 tablet, Rfl: 3    DALIRESP 500 mcg Tab, TAKE 1 TABLET BY MOUTH ONCE DAILY, Disp: 90 tablet, Rfl: 3    denosumab (PROLIA) 60 mg/mL Syrg, every 6 (six) months., Disp: , Rfl:     DEXILANT 60 mg capsule, Take 1 capsule by mouth once daily., Disp: , Rfl:     diclofenac (VOLTAREN) 25 MG TbEC, TAKE 1 TABLET BY MOUTH TWICE DAILY, Disp: 60 tablet, Rfl: 11    diltiaZEM (CARDIZEM LA) 120 mg 24 hr tablet, Take 1 tablet (120 mg total) by mouth once daily., Disp: 30 tablet, Rfl: 3    EScitalopram oxalate (LEXAPRO) 20 MG tablet, TAKE 1 TABLET BY MOUTH once a day, Disp: 90 tablet, Rfl: 1    fluticasone propionate (FLONASE) 50 mcg/actuation nasal spray, 2 sprays (100 mcg total) by Each Nostril route once daily at 6am., Disp: 16 g, Rfl: 11    fluticasone-umeclidin-vilanter  (TRELEGY ELLIPTA) 200-62.5-25 mcg inhaler, Inhale 1 puff into the lungs once daily., Disp: 60 each, Rfl: 11    gabapentin (NEURONTIN) 100 MG capsule, Take 100 mg by mouth 2 (two) times daily as needed., Disp: , Rfl:     hydroCHLOROthiazide (HYDRODIURIL) 25 MG tablet, TAKE 1 TABLET BY MOUTH DAILY AS NEEDED, Disp: 30 tablet, Rfl: 3    ipratropium (ATROVENT) 42 mcg (0.06 %) nasal spray, 2 sprays by Nasal route 4 (four) times daily., Disp: 15 mL, Rfl: 11    ketoconazole (NIZORAL) 2 % cream, Apply topically 2 (two) times daily. For dry flaky patches of ear., Disp: 30 g, Rfl: 1    latanoprost 0.005 % ophthalmic solution, Place 1 drop into both eyes every evening., Disp: 2.5 mL, Rfl: 12    metFORMIN (GLUCOPHAGE) 500 MG tablet, TAKE 1 TABLET BY MOUTH ONCE DAILY, Disp: 90 tablet, Rfl: 3    metoprolol tartrate (LOPRESSOR) 50 MG tablet, Take 1 tablet (50 mg total) by mouth 2 (two) times daily., Disp: 60 tablet, Rfl: 3    mirabegron (MYRBETRIQ) 50 mg Tb24, Take 1 tablet (50 mg total) by mouth once daily., Disp: 30 tablet, Rfl: 11    montelukast (SINGULAIR) 10 mg tablet, TAKE 1 TABLET BY MOUTH ONCE DAILY, Disp: 90 tablet, Rfl: 3    nitroGLYCERIN (NITROSTAT) 0.4 MG SL tablet, DISSOLVE 1 TABLET UNDER TONGUE EVERY 5 MINUTES FOR CHEST PAIN (MAY TAKE UP TO 3 DOSES THEN SEEK MEDICAL ATTENTION), Disp: 25 tablet, Rfl: 0    omeprazole (PRILOSEC) 40 MG capsule, TAKE 1 CAPSULE BY MOUTH ONCE DAILY IN THE MORNING, Disp: 90 capsule, Rfl: 3    predniSONE (DELTASONE) 20 MG tablet, 3 daily x 3 days, 2 daily x 3 days, 1 daily x 3 days, 1/2 daily x 4 days., Disp: 20 tablet, Rfl: 0    ranolazine (RANEXA) 1,000 mg Tb12, TAKE 1 TABLET BY MOUTH TWICE DAILY, Disp: 60 tablet, Rfl: 6    timolol maleate 0.5% (TIMOPTIC) 0.5 % Drop, Place 1 drop into both eyes every morning., Disp: 5 mL, Rfl: 12    tiZANidine (ZANAFLEX) 4 MG tablet, TAKE 1 TABLET BY MOUTH TWICE DAILY AS NEEDED, Disp: 60 tablet, Rfl: 0    triamcinolone acetonide 0.025%  (KENALOG) 0.025 % cream, Apply topically 2 (two) times daily. PRN rash and itching of ear. Mild steroid cream., Disp: 30 g, Rfl: 0    valsartan (DIOVAN) 320 MG tablet, Take 1 tablet (320 mg total) by mouth once daily., Disp: 30 tablet, Rfl: 3    Current Facility-Administered Medications:     acetaminophen tablet 650 mg, 650 mg, Oral, Once PRN, Darwin Perkins MD     Objective:     Vitals:    05/06/22 1138   BP: (!) 144/78   Pulse: 78     Physical Exam   Eyes: Conjunctivae are normal.   Pulmonary/Chest: Effort normal. No respiratory distress.   Musculoskeletal:         General: Swelling (Right thumb CMC.  No warmth or erythema.) present. Normal range of motion.      Comments: Pain referred with shoulder abduction past 50° right side.  Arm drop test negative.        Reviewed available old and all outside pertinent medical records available.    All lab results personally reviewed and interpreted by me.  Lab Results   Component Value Date    WBC 10.45 01/06/2022    HGB 12.1 01/06/2022    HCT 37.0 01/06/2022    MCV 95 01/06/2022    RDW 12.6 01/06/2022     01/06/2022    PLTEST Decreased (A) 09/25/2020       Lab Results   Component Value Date     (L) 05/06/2022    K 3.3 (L) 05/06/2022     05/06/2022    CO2 22 (L) 05/06/2022    GLU 92 05/06/2022    BUN 17 05/06/2022    CALCIUM 9.6 05/06/2022    PROT 6.9 05/06/2022    ALBUMIN 3.1 (L) 05/06/2022    BILITOT 0.3 05/06/2022    AST 14 05/06/2022    ALKPHOS 52 (L) 05/06/2022    ALT 22 05/06/2022       Lab Results   Component Value Date    COLORU Yellow 03/04/2021    APPEARANCEUA Clear 03/04/2021    SPECGRAV 1.010 03/04/2021    PHUR 7.0 03/04/2021    PROTEINUA Negative 03/04/2021    KETONESU Negative 03/04/2021    LEUKOCYTESUR Negative 03/04/2021    NITRITE Negative 03/04/2021    UROBILINOGEN Negative 08/20/2020       Lab Results   Component Value Date    PTH 34.6 02/12/2020       Lab Results   Component Value Date    URICACID 3.0 01/06/2022       Lab Results    Component Value Date    CRP 14.2 (H) 01/06/2022       Lab Results   Component Value Date    ANATITER 1:80 02/26/2021       No components found for: TSPOTTB,  QUANTIFERON     ASSESSMENT:     Osteoporosis with past fragility fractures, on therapy with Prolia since February 2020 due to history of CKD.  Will continue Prolia as bone antiresorptive agent unless absolute contraindication to discontinue medication.  At risk of increased fragility fractures with discontinuation of this particular agent.  Will repeat labs and densitometry test to reassess fragility fracture risk.  If significant decline on T-scores, will recommend trial of Tymlos or Forteo if no contraindication on labs.  No history of radiation or nephrolithiasis.  Side effects of therapy discussed in detail, written literature provided.    Chronic pain syndrome, likely from combination neuropathy and osteoarthritis.  Rheumatic workup on file unrevealing.  Probable reactive synovitis on right thumb CMC joint, following with Orthopedics which provide local corticosteroid injections.  Will provide injection on right shoulder today for symptomatic relief and referred to physical therapy.    PLAN:     1. Age-related osteoporosis without current pathological fracture    - Comprehensive Metabolic Panel; Standing  - PTH, Intact; Standing  - Vitamin D; Standing  - DXA Bone Density Spine And Hip; Future    2. Osteoarthritis    3. Other specified counseling    Large Joint Aspiration/Injection: R subacromial bursa    Date/Time: 5/6/2022 12:30 PM  Performed by: Julien Hernandez MD  Authorized by: Julien Hernandez MD     Consent Done?:  Yes (Verbal)  Indications:  Pain  Site marked: the procedure site was marked    Timeout: prior to procedure the correct patient, procedure, and site was verified    Prep: patient was prepped and draped in usual sterile fashion    Local anesthetic:  Lidocaine 2% without epinephrine    Details:  Needle Size:  25 G  Ultrasonic Guidance for  needle placement?: No    Location:  Shoulder  Site:  R subacromial bursa  Medications:  40 mg triamcinolone acetonide 40 mg/mL  Patient tolerance:  Patient tolerated the procedure well with no immediate complications      Disclaimer: This note is prepared using voice recognition software and as such is likely to have errors and has not been proof read. Please contact me for questions.       Julien Hernandez M.D.

## 2022-05-06 NOTE — PROCEDURES
Large Joint Aspiration/Injection: R subacromial bursa    Date/Time: 5/6/2022 12:30 PM  Performed by: Julien Hernandez MD  Authorized by: Julien Hernandez MD     Consent Done?:  Yes (Verbal)  Indications:  Pain  Site marked: the procedure site was marked    Timeout: prior to procedure the correct patient, procedure, and site was verified    Prep: patient was prepped and draped in usual sterile fashion    Local anesthetic:  Lidocaine 2% without epinephrine    Details:  Needle Size:  25 G  Ultrasonic Guidance for needle placement?: No    Location:  Shoulder  Site:  R subacromial bursa  Medications:  40 mg triamcinolone acetonide 40 mg/mL  Patient tolerance:  Patient tolerated the procedure well with no immediate complications

## 2022-05-09 ENCOUNTER — TELEPHONE (OUTPATIENT)
Dept: INTERNAL MEDICINE | Facility: CLINIC | Age: 77
End: 2022-05-09
Payer: MEDICARE

## 2022-05-09 ENCOUNTER — LAB VISIT (OUTPATIENT)
Dept: LAB | Facility: HOSPITAL | Age: 77
End: 2022-05-09
Attending: PEDIATRICS
Payer: MEDICARE

## 2022-05-09 ENCOUNTER — OFFICE VISIT (OUTPATIENT)
Dept: OTOLARYNGOLOGY | Facility: CLINIC | Age: 77
End: 2022-05-09
Payer: MEDICARE

## 2022-05-09 VITALS
BODY MASS INDEX: 23.01 KG/M2 | TEMPERATURE: 99 F | WEIGHT: 134.06 LBS | HEART RATE: 84 BPM | DIASTOLIC BLOOD PRESSURE: 76 MMHG | SYSTOLIC BLOOD PRESSURE: 155 MMHG

## 2022-05-09 DIAGNOSIS — E87.1 HYPONATREMIA: ICD-10-CM

## 2022-05-09 DIAGNOSIS — J34.89 SINUS PRESSURE: Primary | ICD-10-CM

## 2022-05-09 LAB
ANION GAP SERPL CALC-SCNC: 10 MMOL/L (ref 8–16)
BUN SERPL-MCNC: 13 MG/DL (ref 8–23)
CALCIUM SERPL-MCNC: 9.5 MG/DL (ref 8.7–10.5)
CHLORIDE SERPL-SCNC: 99 MMOL/L (ref 95–110)
CO2 SERPL-SCNC: 23 MMOL/L (ref 23–29)
CREAT SERPL-MCNC: 0.7 MG/DL (ref 0.5–1.4)
EST. GFR  (AFRICAN AMERICAN): >60 ML/MIN/1.73 M^2
EST. GFR  (NON AFRICAN AMERICAN): >60 ML/MIN/1.73 M^2
GLUCOSE SERPL-MCNC: 126 MG/DL (ref 70–110)
POTASSIUM SERPL-SCNC: 3.6 MMOL/L (ref 3.5–5.1)
SODIUM SERPL-SCNC: 132 MMOL/L (ref 136–145)

## 2022-05-09 PROCEDURE — 99214 PR OFFICE/OUTPT VISIT, EST, LEVL IV, 30-39 MIN: ICD-10-PCS | Mod: S$GLB,,, | Performed by: PHYSICIAN ASSISTANT

## 2022-05-09 PROCEDURE — 3077F SYST BP >= 140 MM HG: CPT | Mod: CPTII,S$GLB,, | Performed by: PHYSICIAN ASSISTANT

## 2022-05-09 PROCEDURE — 3077F PR MOST RECENT SYSTOLIC BLOOD PRESSURE >= 140 MM HG: ICD-10-PCS | Mod: CPTII,S$GLB,, | Performed by: PHYSICIAN ASSISTANT

## 2022-05-09 PROCEDURE — 3072F LOW RISK FOR RETINOPATHY: CPT | Mod: CPTII,S$GLB,, | Performed by: PHYSICIAN ASSISTANT

## 2022-05-09 PROCEDURE — 80048 BASIC METABOLIC PNL TOTAL CA: CPT | Performed by: PEDIATRICS

## 2022-05-09 PROCEDURE — 3072F PR LOW RISK FOR RETINOPATHY: ICD-10-PCS | Mod: CPTII,S$GLB,, | Performed by: PHYSICIAN ASSISTANT

## 2022-05-09 PROCEDURE — 3078F DIAST BP <80 MM HG: CPT | Mod: CPTII,S$GLB,, | Performed by: PHYSICIAN ASSISTANT

## 2022-05-09 PROCEDURE — 1125F PR PAIN SEVERITY QUANTIFIED, PAIN PRESENT: ICD-10-PCS | Mod: CPTII,S$GLB,, | Performed by: PHYSICIAN ASSISTANT

## 2022-05-09 PROCEDURE — 99214 OFFICE O/P EST MOD 30 MIN: CPT | Mod: S$GLB,,, | Performed by: PHYSICIAN ASSISTANT

## 2022-05-09 PROCEDURE — 99999 PR PBB SHADOW E&M-EST. PATIENT-LVL V: ICD-10-PCS | Mod: PBBFAC,,, | Performed by: PHYSICIAN ASSISTANT

## 2022-05-09 PROCEDURE — 36415 COLL VENOUS BLD VENIPUNCTURE: CPT | Performed by: PEDIATRICS

## 2022-05-09 PROCEDURE — 1159F MED LIST DOCD IN RCRD: CPT | Mod: CPTII,S$GLB,, | Performed by: PHYSICIAN ASSISTANT

## 2022-05-09 PROCEDURE — 3078F PR MOST RECENT DIASTOLIC BLOOD PRESSURE < 80 MM HG: ICD-10-PCS | Mod: CPTII,S$GLB,, | Performed by: PHYSICIAN ASSISTANT

## 2022-05-09 PROCEDURE — 1159F PR MEDICATION LIST DOCUMENTED IN MEDICAL RECORD: ICD-10-PCS | Mod: CPTII,S$GLB,, | Performed by: PHYSICIAN ASSISTANT

## 2022-05-09 PROCEDURE — 99999 PR PBB SHADOW E&M-EST. PATIENT-LVL V: CPT | Mod: PBBFAC,,, | Performed by: PHYSICIAN ASSISTANT

## 2022-05-09 PROCEDURE — 1125F AMNT PAIN NOTED PAIN PRSNT: CPT | Mod: CPTII,S$GLB,, | Performed by: PHYSICIAN ASSISTANT

## 2022-05-09 RX ORDER — DEXAMETHASONE SODIUM PHOSPHATE 4 MG/ML
8 INJECTION, SOLUTION INTRA-ARTICULAR; INTRALESIONAL; INTRAMUSCULAR; INTRAVENOUS; SOFT TISSUE ONCE
Status: DISCONTINUED | OUTPATIENT
Start: 2022-05-09 | End: 2022-05-09

## 2022-05-09 RX ORDER — DEXAMETHASONE SODIUM PHOSPHATE 10 MG/ML
8 INJECTION INTRAMUSCULAR; INTRAVENOUS
Status: DISCONTINUED | OUTPATIENT
Start: 2022-05-09 | End: 2022-05-16

## 2022-05-09 RX ORDER — AZITHROMYCIN 250 MG/1
TABLET, FILM COATED ORAL
Qty: 6 TABLET | Refills: 0 | Status: SHIPPED | OUTPATIENT
Start: 2022-05-09 | End: 2022-08-26

## 2022-05-09 NOTE — TELEPHONE ENCOUNTER
inform pt dr say go to ER if feeling worst. He cant call anything more in. Keep upcoming ENT and pulm apt. on 5/16.      ----- Message from CARLEE Chaudhari Jr., MD sent at 5/9/2022 10:58 AM CDT -----  She sees ENT and pulmonary on 5/16. If she cannot wait and is worsening she will need to go to ER. If still having trouble but not worse, keep 5/16 appts.  ----- Message -----  From: Carin Renae MA  Sent: 5/9/2022  10:10 AM CDT  To: CARLEE Chaudhari Jr., MD    Inform pt if she is feeling worse then she should go to the ER but pt state she just want something to be called in because the ER has been telling her she's coming too much.     ----- Message -----  From: Vonda Harris  Sent: 5/9/2022   9:37 AM CDT  To: Watson Skinner Jr Staff    States she needs to speak to the nurse. States she is very sick, she has sinus, jaw pain, coughing, yellow and green mucus, and fever 100.4. Please call pt [phone]. Thank you     Type:  Same Day Appointment Request    Caller is requesting a same day appointment.  Caller declined first available appointment listed below.    Name of Caller:pt  When is the first available appointment?5/10  Symptoms:sinus/fever  Best Call Back Number:[phone]  Additional Information: States she was seen on 5/4, Wednesday and it has gotten triple worse.     Thank you

## 2022-05-09 NOTE — PROGRESS NOTES
Subjective:   Patient ID: Shireen Felix is a 76 y.o. female.    Chief Complaint: Sinus Problem (Pt complains of headaches, sinus pressure, sore lymph nodes, nasal congestion, cough; symptoms started 1 wk ago )    75 yo female here to see me today with complaints of     MAJOR SYMPTOMS:  Yes  Purulent anterior nasal discharge  Yes  Purulent or discolored posterior nasal discharge  Yes  Nasal congestion or obstruction  Yes  Facial Congestion or fullness  No  Hyposmia or anosmia  Yes  Fever    MINOR SYMPTOMS:  Yes  Headache  Yes  Ear pain, pressure, or fullness  No  Halitosis  Yes  Dental pain  Yes  Fatigue    The diagnosis of acute sinusitis is based on the presence of at least 2 major or 1 major and at least 2 minor symptoms.  Shireen does meet this criteria.  Clinical Practice Guideline for Acute Bacterial Rhinosinusitis in Children and Adults. Clin Infect Dis 2012; 54:e72    Onset:  1 week ago  Exacerbating factors: No  Relieving factors: No  Timing:  worsening    Review of patient's allergies indicates:   Allergen Reactions    Penicillins Anaphylaxis, Hives, Shortness Of Breath and Swelling    Adhesive Rash    Betadine [povidone-iodine] Rash     Pt confirms has completed CT w/ IV contrast-iodine without reaction or need for pre-medication.    Doxycycline Nausea Only    Oxycodone Other (See Comments)     Fatigue      Sulfa (sulfonamide antibiotics) Itching    Zanaflex [tizanidine] Anxiety           Review of Systems   Constitutional: Negative for chills, fatigue, fever and unexpected weight change.   HENT: Positive for congestion, sinus pressure and sinus pain. Negative for dental problem, ear discharge, ear pain, facial swelling, hearing loss, nosebleeds, postnasal drip, rhinorrhea, sneezing, sore throat, tinnitus, trouble swallowing and voice change.    Eyes: Negative for redness, itching and visual disturbance.   Respiratory: Positive for cough. Negative for choking, shortness of breath and wheezing.     Cardiovascular: Negative for chest pain and palpitations.   Gastrointestinal: Negative for abdominal pain.        No reflux.   Musculoskeletal: Negative for gait problem.   Skin: Negative for rash.   Neurological: Positive for headaches. Negative for dizziness and light-headedness.         Objective:   BP (!) 155/76   Pulse 84   Temp 98.6 °F (37 °C) (Temporal)   Wt 60.8 kg (134 lb 0.6 oz)   LMP  (LMP Unknown)   BMI 23.01 kg/m²     Physical Exam  HENT:      Right Ear: Tympanic membrane is retracted.      Left Ear: Tympanic membrane is retracted.              Assessment:     1. Sinus pressure        Plan:     Sinus pressure    Other orders  -     Discontinue: dexamethasone injection 8 mg  -     azithromycin (Z-EMEKA) 250 MG tablet; Take as directed  Dispense: 6 tablet; Refill: 0  -     dexamethasone injection 8 mg    I have treated her today with a steroid pack as well as azithromycin. I will see her back in 2 weeks or sooner if needed. If no improvement, will plan on imaging.

## 2022-05-12 ENCOUNTER — TELEPHONE (OUTPATIENT)
Dept: OTOLARYNGOLOGY | Facility: CLINIC | Age: 77
End: 2022-05-12
Payer: MEDICARE

## 2022-05-12 RX ORDER — LEVOFLOXACIN 500 MG/1
500 TABLET, FILM COATED ORAL DAILY
Qty: 7 TABLET | Refills: 0 | Status: SHIPPED | OUTPATIENT
Start: 2022-05-12 | End: 2022-05-19

## 2022-05-12 NOTE — TELEPHONE ENCOUNTER
Please let patient know that I sent in Levaquin x 7 days.  Looks like she's tolerated this in the past.  She should keep scheduled followup with [facility] if no improvement.    ----- Message from Elizabeth Cobian LPN sent at 5/12/2022  9:45 AM CDT -----  Spoke with pt, states she is taking z terrence as directed, has one dose left, no improvement. Wants another ABX RX. Please advise.  ----- Message -----  From: Vonda Harris  Sent: 5/12/2022   9:26 AM CDT  To: Katlin Woodard Staff    States she was seen on Monday. States is not a better, she would like someone to give her a call. Please call pt [phone]. Thank you

## 2022-05-13 ENCOUNTER — NURSE TRIAGE (OUTPATIENT)
Dept: ADMINISTRATIVE | Facility: CLINIC | Age: 77
End: 2022-05-13
Payer: MEDICARE

## 2022-05-13 NOTE — TELEPHONE ENCOUNTER
Reason for Disposition   Taking antibiotic > 72 hours (3 days) and sinus pain not improved    Additional Information   Negative: SEVERE difficulty breathing (e.g., struggling for each breath, speaks in single words)   Negative: Sounds like a life-threatening emergency to the triager   Negative: Difficulty breathing and not from stuffy nose (e.g., not relieved by cleaning out the nose)   Negative: SEVERE headache and fever   Negative: Taking antibiotic > 24 hours and fever > 103 F (39.4 C)   Negative: Redness or swelling on the cheek, forehead or around the eye and fever   Negative: Patient sounds very sick or weak to the triager   Negative: SEVERE sinus pain and not improved 2 hours after pain medicine   Negative: Redness or swelling on the cheek, forehead or around the eye and new since starting antibiotics   Negative: Taking antibiotic > 48 hours (2 days) and fever persists    Protocols used: SINUS INFECTION ON ANTIBIOTIC FOLLOW-UP CALL-A-[place]   Pt called re sinus infection and not feeling well. started sx last weekend. seen by ENT on Monday. gave pred shot and zpak, zpak didn't help. called ENT yest and was given levaquin as sinuses drained bloody mucous. now all day today nauseated,, loose stool., no SOB. some HA, low grade fever this week but better yest. mild HA, cheekbones hurts. eating and drinking today. pt states wed right back to how she felt on Monday. pt has Zofran at home. Can she take that? Can we just change her med? Pt states she tried to get in touch with the ENT office and they left for the day. Offered ready. Pt declined.  pt states she will try the zofran and if its no help will go to ED. Attempted to contact PCP office. Spoke with zuleyka at appt desk.  No one answering in PCP clinic. Spoke with dr anderson via secure chat. Can take Zofran.  Antibiotic.is likely upsetting her stomach but changing it would not fix that.  Use Zofran.  Push fluids.  [facility] tomorrow if not feeling better. Pt  notified. Call back with questions

## 2022-05-16 ENCOUNTER — TELEPHONE (OUTPATIENT)
Dept: OTOLARYNGOLOGY | Facility: CLINIC | Age: 77
End: 2022-05-16
Payer: MEDICARE

## 2022-05-16 ENCOUNTER — OFFICE VISIT (OUTPATIENT)
Dept: PULMONOLOGY | Facility: CLINIC | Age: 77
End: 2022-05-16
Payer: MEDICARE

## 2022-05-16 ENCOUNTER — HOSPITAL ENCOUNTER (OUTPATIENT)
Dept: RADIOLOGY | Facility: HOSPITAL | Age: 77
Discharge: HOME | End: 2022-05-16
Attending: NURSE PRACTITIONER
Payer: MEDICARE

## 2022-05-16 VITALS
HEART RATE: 74 BPM | SYSTOLIC BLOOD PRESSURE: 104 MMHG | HEIGHT: 64 IN | OXYGEN SATURATION: 99 % | WEIGHT: 134.06 LBS | RESPIRATION RATE: 20 BRPM | BODY MASS INDEX: 22.89 KG/M2 | DIASTOLIC BLOOD PRESSURE: 74 MMHG

## 2022-05-16 DIAGNOSIS — E11.22 CONTROLLED TYPE 2 DIABETES MELLITUS WITH STAGE 3 CHRONIC KIDNEY DISEASE, WITHOUT LONG-TERM CURRENT USE OF INSULIN: ICD-10-CM

## 2022-05-16 DIAGNOSIS — J34.89 SINUS PRESSURE: Primary | ICD-10-CM

## 2022-05-16 DIAGNOSIS — J41.1 MUCOPURULENT CHRONIC BRONCHITIS: ICD-10-CM

## 2022-05-16 DIAGNOSIS — F51.01 PRIMARY INSOMNIA: ICD-10-CM

## 2022-05-16 DIAGNOSIS — J84.112 UIP (USUAL INTERSTITIAL PNEUMONITIS): Primary | ICD-10-CM

## 2022-05-16 DIAGNOSIS — N18.30 CONTROLLED TYPE 2 DIABETES MELLITUS WITH STAGE 3 CHRONIC KIDNEY DISEASE, WITHOUT LONG-TERM CURRENT USE OF INSULIN: ICD-10-CM

## 2022-05-16 DIAGNOSIS — J84.112 UIP (USUAL INTERSTITIAL PNEUMONITIS): ICD-10-CM

## 2022-05-16 DIAGNOSIS — I50.32 CHRONIC DIASTOLIC HEART FAILURE: ICD-10-CM

## 2022-05-16 DIAGNOSIS — J45.40 MODERATE PERSISTENT ASTHMA WITHOUT COMPLICATION: ICD-10-CM

## 2022-05-16 DIAGNOSIS — J30.9 CHRONIC ALLERGIC RHINITIS: ICD-10-CM

## 2022-05-16 DIAGNOSIS — G89.4 CHRONIC PAIN SYNDROME: ICD-10-CM

## 2022-05-16 PROCEDURE — 3072F LOW RISK FOR RETINOPATHY: CPT | Mod: CPTII,S$GLB,, | Performed by: NURSE PRACTITIONER

## 2022-05-16 PROCEDURE — 3288F PR FALLS RISK ASSESSMENT DOCUMENTED: ICD-10-PCS | Mod: CPTII,S$GLB,, | Performed by: NURSE PRACTITIONER

## 2022-05-16 PROCEDURE — 1159F MED LIST DOCD IN RCRD: CPT | Mod: CPTII,S$GLB,, | Performed by: NURSE PRACTITIONER

## 2022-05-16 PROCEDURE — 99214 PR OFFICE/OUTPT VISIT, EST, LEVL IV, 30-39 MIN: ICD-10-PCS | Mod: S$GLB,,, | Performed by: NURSE PRACTITIONER

## 2022-05-16 PROCEDURE — 99214 OFFICE O/P EST MOD 30 MIN: CPT | Mod: S$GLB,,, | Performed by: NURSE PRACTITIONER

## 2022-05-16 PROCEDURE — 3074F SYST BP LT 130 MM HG: CPT | Mod: CPTII,S$GLB,, | Performed by: NURSE PRACTITIONER

## 2022-05-16 PROCEDURE — 99999 PR PBB SHADOW E&M-EST. PATIENT-LVL V: ICD-10-PCS | Mod: PBBFAC,,, | Performed by: NURSE PRACTITIONER

## 2022-05-16 PROCEDURE — 71046 X-RAY EXAM CHEST 2 VIEWS: CPT | Mod: TC

## 2022-05-16 PROCEDURE — 3078F PR MOST RECENT DIASTOLIC BLOOD PRESSURE < 80 MM HG: ICD-10-PCS | Mod: CPTII,S$GLB,, | Performed by: NURSE PRACTITIONER

## 2022-05-16 PROCEDURE — 99999 PR PBB SHADOW E&M-EST. PATIENT-LVL V: CPT | Mod: PBBFAC,,, | Performed by: NURSE PRACTITIONER

## 2022-05-16 PROCEDURE — 71046 XR CHEST PA AND LATERAL: ICD-10-PCS | Mod: 26,,, | Performed by: RADIOLOGY

## 2022-05-16 PROCEDURE — 3078F DIAST BP <80 MM HG: CPT | Mod: CPTII,S$GLB,, | Performed by: NURSE PRACTITIONER

## 2022-05-16 PROCEDURE — 1101F PT FALLS ASSESS-DOCD LE1/YR: CPT | Mod: CPTII,S$GLB,, | Performed by: NURSE PRACTITIONER

## 2022-05-16 PROCEDURE — 1160F RVW MEDS BY RX/DR IN RCRD: CPT | Mod: CPTII,S$GLB,, | Performed by: NURSE PRACTITIONER

## 2022-05-16 PROCEDURE — 3072F PR LOW RISK FOR RETINOPATHY: ICD-10-PCS | Mod: CPTII,S$GLB,, | Performed by: NURSE PRACTITIONER

## 2022-05-16 PROCEDURE — 1160F PR REVIEW ALL MEDS BY PRESCRIBER/CLIN PHARMACIST DOCUMENTED: ICD-10-PCS | Mod: CPTII,S$GLB,, | Performed by: NURSE PRACTITIONER

## 2022-05-16 PROCEDURE — 3288F FALL RISK ASSESSMENT DOCD: CPT | Mod: CPTII,S$GLB,, | Performed by: NURSE PRACTITIONER

## 2022-05-16 PROCEDURE — 1101F PR PT FALLS ASSESS DOC 0-1 FALLS W/OUT INJ PAST YR: ICD-10-PCS | Mod: CPTII,S$GLB,, | Performed by: NURSE PRACTITIONER

## 2022-05-16 PROCEDURE — 1159F PR MEDICATION LIST DOCUMENTED IN MEDICAL RECORD: ICD-10-PCS | Mod: CPTII,S$GLB,, | Performed by: NURSE PRACTITIONER

## 2022-05-16 PROCEDURE — 71046 X-RAY EXAM CHEST 2 VIEWS: CPT | Mod: 26,,, | Performed by: RADIOLOGY

## 2022-05-16 PROCEDURE — 3074F PR MOST RECENT SYSTOLIC BLOOD PRESSURE < 130 MM HG: ICD-10-PCS | Mod: CPTII,S$GLB,, | Performed by: NURSE PRACTITIONER

## 2022-05-16 RX ORDER — PREDNISONE 5 MG/1
TABLET ORAL
Qty: 90 TABLET | Refills: 0 | Status: SHIPPED | OUTPATIENT
Start: 2022-05-16 | End: 2022-08-26

## 2022-05-16 NOTE — ASSESSMENT & PLAN NOTE
continue Trelegy 200 mcg triple therapy. Continue Daliresp, Albuterol inhaler, albuterol nebs. .  Acute on chronic sinusitis and bronchitis  Completed zpack and took last levaquin x 7 days yesterday.   Prednisone taper

## 2022-05-16 NOTE — ASSESSMENT & PLAN NOTE
Managed by primary care provider  Hemoglobin A1C   Date Value Ref Range Status   05/03/2022 5.5 4.0 - 5.6 % Final     Comment:     ADA Screening Guidelines:  5.7-6.4%  Consistent with prediabetes  >or=6.5%  Consistent with diabetes    High levels of fetal hemoglobin interfere with the HbA1C  assay. Heterozygous hemoglobin variants (HbS, HgC, etc)do  not significantly interfere with this assay.   However, presence of multiple variants may affect accuracy.     10/27/2021 4.9 4.0 - 5.6 % Final     Comment:     ADA Screening Guidelines:  5.7-6.4%  Consistent with prediabetes  >or=6.5%  Consistent with diabetes    High levels of fetal hemoglobin interfere with the HbA1C  assay. Heterozygous hemoglobin variants (HbS, HgC, etc)do  not significantly interfere with this assay.   However, presence of multiple variants may affect accuracy.     12/02/2020 5.0 4.0 - 5.6 % Final     Comment:     ADA Screening Guidelines:  5.7-6.4%  Consistent with prediabetes  >or=6.5%  Consistent with diabetes  High levels of fetal hemoglobin interfere with the HbA1C  assay. Heterozygous hemoglobin variants (HbS, HgC, etc)do  not significantly interfere with this assay.   However, presence of multiple variants may affect accuracy.       Risk and benefits of steroid therapy in diabetic patient were reviewed, patient is cautioned to monitor blood sugars and discontinue steroid therapy for any elevations above 300.

## 2022-05-16 NOTE — ASSESSMENT & PLAN NOTE
MCB ROS: taking medications as instructed, no medication side effects noted.   New concerns: NONE.   Exam:  rales noted BILATERALLY.   Assessment:  MCB stable.   Plan: VENTOLIN .DUONEB, TRELEGY.   Complete levaquin, begin prednisone 5 mg taper

## 2022-05-16 NOTE — PROGRESS NOTES
Subjective:      Established patient    Patient ID: Shireen Feilx is a 76 y.o. female.  Patient Active Problem List   Diagnosis    Nasal septal perforation    Postherpetic neuralgia    Conductive hearing loss, unilateral    Chronic otitis media    Chronic allergic rhinitis    Elevated IgE level    GERD (gastroesophageal reflux disease)    History of cerebral aneurysm repair    History of tobacco abuse    HTN (hypertension)    Iron deficiency anemia    Spinal stenosis of lumbar region with neurogenic claudication    Mucopurulent chronic bronchitis    UIP (usual interstitial pneumonitis)    Recurrent falls    DM (diabetes mellitus) type II controlled with renal manifestation    CAD in native artery    Neuroforaminal stenosis of lumbar spine    Lumbar radiculopathy    History of stroke    Hypertension associated with diabetes    Normocytic anemia    Hyponatremia    Debility    Osteoarthritis of left wrist    Chronic back pain    Chronic pain syndrome    Decreased GFR    Age-related osteoporosis without current pathological fracture    Shoulder arthritis    Spontaneous ecchymosis    Bilateral carpal tunnel syndrome    Cervical radiculopathy    Cubital tunnel syndrome on right    Abnormal chest x-ray    Depression    Hyperglycemia due to type 2 diabetes mellitus    Imbalance    Long term current use of insulin    Mild protein-calorie malnutrition    Mixed hyperlipidemia    Moderate recurrent major depression    Orthostatic hypotension    Other constipation    Overactive bladder    Perforation of right tympanic membrane    Primary insomnia    Thrush, oral    Vitamin D deficiency    Polypharmacy    Multiple neurological symptoms    DDD (degenerative disc disease), thoracolumbar    DDD (degenerative disc disease), thoracic    DDD (degenerative disc disease), lumbosacral    DDD (degenerative disc disease), lumbar    DDD (degenerative disc disease), cervical     History of cerebral aneurysm    Hx of craniotomy    Temporal lobe epilepsy    Syncope    Spinal stenosis    Fluctuating blood pressure    Sacroiliitis, not elsewhere classified    Chronic diastolic heart failure    Moderate persistent asthma without complication       Problem list has been reviewed.    Chief Complaint: smoker      HPI     Shireen Felix is a 76 y.o. female presents for follow up on chronic UIP, asthma with chronic bronchitis.     Patients reports stable NYHA II dyspnea     Since having covid 19 + on 2/24/2022. Recently tested covid negative 3/7/2022.     The patient has currently have symptoms, she states she was doing well with regard to lungs, the Trelegy 200 mcg diffiently improved breathing, until she had sinus infection, blow green with sinus pressure, then triggered cough and chest congestion. Took zpack, no improvement, now on Levaquin with some improvement.      She reports daily productive cough clear and yellow. wheeze intermittent. Denies hemoptysis.    Her current respiratory therapy regimen is TRELEGY 200 mcg. VENTOLIN inhaler, albuterol nebs, Daliresp 500 mcg.  She is  adherent with her regimen.      Patient questions prednisone low dose for her arthritis and her acute flare of sinus and chest. She was on 5 mg prednisone prior by rheumatology. She states with change of rheumatology physicians, Prednisone 5 mg daily has not been reordered yet. She is now followed by Dr. Hernandez. 5/6/2022, had steroid joint right shoulder injection 40 mg, then 5/9/2022, 8 mg IM steroid with ENT.  Advised patient since lung flare post sinus infection would provide Prednisone 5 mg with taper, and if daily dose to be continued to follow up with Dr. Hernandez, Rheumatology.       COPD Questionnaire  How often do you cough?: Some of the time  How often do you have phlegm (mucus) in your chest?: Some of the time  How often does your chest feel tight?: A little of the time  When you walk up a hill  or one flight of stairs, how often are you breathless?: Almost never  How often are you limited doing any activities at home?: Never  How often are you confident leaving the house despite your lung condition?: All of the time  How often do you sleep soundly?: Most of the time  How often do you have energy?: Most of the time  Total score: 11     Immunization status reviewed and up to date.       A full  review of systems, past , family  and social histories was performed except as mentioned in the note above, these are non contributory to the main issues discussed today.     Previous Report Reviewed: lab reports, office notes and radiology reports     The following portions of the patient's history were reviewed and updated as appropriate: She  has a past medical history of Abnormal ECG (8/5/2019), Aneurysm, Anticoagulant long-term use, Arthritis, CAD in native artery (8/5/2019), COPD (chronic obstructive pulmonary disease), Diabetes mellitus, Fall (10/10/2019), Glaucoma, Hypertension, Seizures, Shingles (05/27/2017), and Stroke.  She  has a past surgical history that includes Brain surgery; Hysterectomy; Transforaminal epidural injection of steroid (Bilateral, 7/23/2019); Cardiac catheterization; Coronary angioplasty; Epidural steroid injection into lumbar spine (Bilateral, 11/12/2019); Injection of anesthetic agent around medial branch nerves innervating lumbar facet joint (Bilateral, 1/31/2020); Transforaminal epidural injection of steroid (Bilateral, 3/10/2020); Transforaminal epidural injection of steroid (Right, 6/19/2020); Injection of joint (Bilateral, 7/9/2020); and Injection of anesthetic agent into sacroiliac joint (Right, 11/16/2021).  Her family history includes No Known Problems in her father and mother.  She  reports that she quit smoking about 18 years ago. Her smoking use included cigarettes. She has a 10.00 pack-year smoking history. She has never used smokeless tobacco. She reports that she does  "not drink alcohol and does not use drugs.  She has a current medication list which includes the following prescription(s): acetaminophen, albuterol, albuterol, albuterol, amlodipine, aptiom, aptiom, atorvastatin, azithromycin, azithromycin, cholecalciferol (vitamin d3), clopidogrel, daliresp, denosumab, dexilant, diclofenac, diltiazem, escitalopram oxalate, fluticasone propionate, trelegy ellipta, gabapentin, hydrochlorothiazide, ipratropium, ketoconazole, latanoprost, levofloxacin, metformin, metoprolol tartrate, myrbetriq, montelukast, nitroglycerin, omeprazole, ranolazine, timolol maleate 0.5%, tizanidine, tramadol, triamcinolone acetonide 0.025%, valsartan, and prednisone, and the following Facility-Administered Medications: acetaminophen.  She is allergic to penicillins, adhesive, betadine [povidone-iodine], doxycycline, oxycodone, sulfa (sulfonamide antibiotics), and zanaflex [tizanidine]..    Review of Systems   Constitutional: Negative for fever and chills.   HENT: Positive for hearing loss. Negative for nosebleeds, rhinorrhea and congestion.    Eyes: Negative for redness.   Respiratory: Positive for cough, sputum production, wheezing, dyspnea on extertion and use of rescue inhaler. Negative for choking.    Genitourinary: Negative for hematuria.   Endocrine: Diabetes melllitus Negative for cold intolerance.    Musculoskeletal: Positive for arthralgias and back pain.   Skin: Negative for rash.   Gastrointestinal: Positive for acid reflux. Negative for vomiting.   Neurological: Negative for syncope and headaches.        History of stroke  Seizure disorder.  Post herpetic neuralgia   Hematological: Negative for adenopathy. Bleeds easily and excessive bruising.   Psychiatric/Behavioral: Negative for confusion.        Objective:     /74   Pulse 74   Resp 20   Ht 5' 4" (1.626 m)   Wt 60.8 kg (134 lb 0.6 oz)   LMP  (LMP Unknown)   SpO2 99%   BMI 23.01 kg/m²   Body mass index is 23.01 kg/m². "     Physical Exam  Constitutional:       Appearance: She is well-developed.   HENT:      Head: Normocephalic and atraumatic.      Right Ear: Tympanic membrane normal.      Left Ear: Tympanic membrane normal.   Eyes:      Pupils: Pupils are equal, round, and reactive to light.   Cardiovascular:      Rate and Rhythm: Normal rate and regular rhythm.      Heart sounds: Normal heart sounds. No murmur heard.  Pulmonary:      Effort: Pulmonary effort is normal. No respiratory distress.   Abdominal:      General: Bowel sounds are normal.      Palpations: Abdomen is soft.   Musculoskeletal:         General: No tenderness. Normal range of motion.      Cervical back: Normal range of motion and neck supple.   Skin:     General: Skin is warm and dry.      Coloration: Skin is not pale.   Neurological:      Mental Status: She is alert and oriented to person, place, and time.   Psychiatric:         Behavior: Behavior normal.         Personal Diagnostic Review    X-Ray Chest PA And Lateral  Narrative: EXAMINATION:  XR CHEST PA AND LATERAL    CLINICAL HISTORY:  Idiopathic pulmonary fibrosis    TECHNIQUE:  PA and lateral views of the chest were performed.    COMPARISON:  Prior radiographs    FINDINGS:  Cardiac silhouette and mediastinal contours are normal.  Lungs demonstrate no acute opacity with similar chronic interstitial changes.  No pleural effusion.  Osseous structures are intact.  Impression: Stable chest    Electronically signed by: Davy Gutierrez MD  Date:    05/16/2022  Time:    11:27      Results for orders placed during the hospital encounter of 03/15/22    X-Ray Chest PA And Lateral    Narrative  EXAMINATION:  XR CHEST PA AND LATERAL    CLINICAL HISTORY:  Unspecified asthma, uncomplicated    TECHNIQUE:  PA and lateral views of the chest were performed.    COMPARISON:  08/17/2021    FINDINGS:  The cardiac and mediastinal silhouettes appear within normal limits.   Faint chronic appearing interstitial opacities seen in  the periphery of the lungs bilaterally with no appreciable change from prior.  No focal parenchymal consolidation or definite pleural effusion visualized.  No acute osseous findings demonstrated.    Impression  Unchanged appearance of the chest as above.  No acute findings      Electronically signed by: Neri Husain MD  Date:    03/15/2022  Time:    11:24          XR CHEST PA AND LATERAL: 20:      Coarsening of the bronchovascular markings again noted.  No confluent airspace disease.  No pleural effusion.  Cardiomediastinal silhouette and osseous structures are stable in appearance.    Pulmonary function tests:20 :  FEV1: 2.13 (103.5 % predicted), FVC:  2.44 (91.0 % predicted), FEV1/FVC:  87.75, T.47 ( 90.1 % predicted) RV /TLC 45 ( 102% predicted, DLCO: 14.51 (70.2 % predicted)  Normal spirometry. Lung volumes not done. Diffusion capacity is mildly reduced but corrects for alveolar volume.         CT of chest performed on 20 with contrast:       1. Negative CTA chest.  No pulmonary embolism.  2. No active infiltrate.  Interstitial lung disease with coarsening of the peripheral septal markings, most notable bibasilar distribution, with mild bibasilar honeycombing.  Stable 6 mm noncalcified pulmonary nodule right lower lobe.  3. Multitude bilateral renal cortical cysts.  4. Multilevel degenerative thoracolumbar spine.  Multiple stable thoracic disc herniations, most notably at T6-7, with moderate central protrusion resulting in spinal canal stenosis (8 mm).  Scattered Schmorl's nodes.  Stable mild compression deformity/loss of height at T12.  There is also mild compression deformity of L2, new compared with 2020.      Assessment / Plan:       Discussed diagnosis, its evaluation, treatment and usual course. All questions answered.  Requested Prescriptions     Signed Prescriptions Disp Refills    predniSONE (DELTASONE) 5 MG tablet 90 tablet 0     Si tabs x 5 days, then 3 tabs x  5 days, then 2 tabs x 5 days, then 5 mg daily x 15 days       Problem List Items Addressed This Visit     Mucopurulent chronic bronchitis (Chronic)    Current Assessment & Plan     MCB ROS: taking medications as instructed, no medication side effects noted.   New concerns: NONE.   Exam:  rales noted BILATERALLY.   Assessment:  MCB stable.   Plan: VENTOLIN .DUONEB, TRELEGY.   Complete levaquin, begin prednisone 5 mg taper            Relevant Medications    predniSONE (DELTASONE) 5 MG tablet    Other Relevant Orders    X-Ray Chest PA And Lateral (Completed)    Complete PFT with bronchodilator    UIP (usual interstitial pneumonitis) - Primary    Relevant Medications    predniSONE (DELTASONE) 5 MG tablet    Other Relevant Orders    X-Ray Chest PA And Lateral (Completed)    Complete PFT with bronchodilator    Primary insomnia    Overview     Formatting of this note might be different from the original.  Continue Restoril 15mg Q HS           Current Assessment & Plan     Managed by primary care provider  restoril 15 mg nightly if needed  Controlled on melatonin            Moderate persistent asthma without complication    Overview     Trelegy 200 mcg triple therapy. Continue Daliresp, Albuterol inhaler, albuterol nebs. .             Current Assessment & Plan     continue Trelegy 200 mcg triple therapy. Continue Daliresp, Albuterol inhaler, albuterol nebs. .  Acute on chronic sinusitis and bronchitis  Completed zpack and took last levaquin x 7 days yesterday.   Prednisone taper             Relevant Medications    predniSONE (DELTASONE) 5 MG tablet    Other Relevant Orders    X-Ray Chest PA And Lateral (Completed)    Complete PFT with bronchodilator    DM (diabetes mellitus) type II controlled with renal manifestation    Current Assessment & Plan     Managed by primary care provider  Hemoglobin A1C   Date Value Ref Range Status   05/03/2022 5.5 4.0 - 5.6 % Final     Comment:     ADA Screening Guidelines:  5.7-6.4%   Consistent with prediabetes  >or=6.5%  Consistent with diabetes    High levels of fetal hemoglobin interfere with the HbA1C  assay. Heterozygous hemoglobin variants (HbS, HgC, etc)do  not significantly interfere with this assay.   However, presence of multiple variants may affect accuracy.     10/27/2021 4.9 4.0 - 5.6 % Final     Comment:     ADA Screening Guidelines:  5.7-6.4%  Consistent with prediabetes  >or=6.5%  Consistent with diabetes    High levels of fetal hemoglobin interfere with the HbA1C  assay. Heterozygous hemoglobin variants (HbS, HgC, etc)do  not significantly interfere with this assay.   However, presence of multiple variants may affect accuracy.     12/02/2020 5.0 4.0 - 5.6 % Final     Comment:     ADA Screening Guidelines:  5.7-6.4%  Consistent with prediabetes  >or=6.5%  Consistent with diabetes  High levels of fetal hemoglobin interfere with the HbA1C  assay. Heterozygous hemoglobin variants (HbS, HgC, etc)do  not significantly interfere with this assay.   However, presence of multiple variants may affect accuracy.       Risk and benefits of steroid therapy in diabetic patient were reviewed, patient is cautioned to monitor blood sugars and discontinue steroid therapy for any elevations above 300.              Chronic pain syndrome    Current Assessment & Plan     Managed by pain management            Chronic diastolic heart failure    Current Assessment & Plan     Stable, managed by cardiology            Chronic allergic rhinitis    Current Assessment & Plan     Improved on atrovent nasal, flonase. singulair.               continue Trelegy 200 mcg triple therapy. Continue Daliresp, Albuterol inhaler, albuterol nebs. .  Acute on chronic sinusitis and bronchitis  Completed zpack and took last levaquin x 7 days yesterday.   Prednisone taper  Non seasonal allergies with post nasal drip and rhinorrhea  continue Atrovent nasal and Flonase. Patient has seen improvement with adding atrovent nasal and  flonase..   5/16/2022 Chest xray stable chronic findings.      I spent a total of 37 minutes on the day of the visit.  This includes face to face time and non-face to face time preparing to see the patient (eg, review of tests), obtaining and/or reviewing separately obtained history, documenting clinical information in the electronic or other health record, independently interpreting results and communicating results to the patient/family/caregiver, or care coordinator.      Follow up in about 6 months (around 11/16/2022) for COPD, Asthma, w/review cpft.

## 2022-05-16 NOTE — TELEPHONE ENCOUNTER
----- Message from Papi Mitchell sent at 5/13/2022  4:20 PM CDT -----  Contact: Jetty  Patient is calling to speak with the nurse regarding the new medication that was prescribed. Reports  medication is making patient feel nauseous and worse. Patient was transferred to on call nurse for immediate assistance. Please give patient a high priority call back at [phone] as requested.   Thanks,  HB  Luz Dalal maybe out of the office today.

## 2022-05-18 ENCOUNTER — TELEPHONE (OUTPATIENT)
Dept: OTOLARYNGOLOGY | Facility: CLINIC | Age: 77
End: 2022-05-18
Payer: MEDICARE

## 2022-05-18 ENCOUNTER — HOSPITAL ENCOUNTER (OUTPATIENT)
Dept: RADIOLOGY | Facility: HOSPITAL | Age: 77
Discharge: HOME | End: 2022-05-18
Attending: PHYSICIAN ASSISTANT
Payer: MEDICARE

## 2022-05-18 DIAGNOSIS — J34.89 SINUS PRESSURE: ICD-10-CM

## 2022-05-18 PROCEDURE — 70220 XR SINUSES MIN 3 VIEWS: ICD-10-PCS | Mod: 26,,, | Performed by: RADIOLOGY

## 2022-05-18 PROCEDURE — 70220 X-RAY EXAM OF SINUSES: CPT | Mod: 26,,, | Performed by: RADIOLOGY

## 2022-05-18 PROCEDURE — 70220 X-RAY EXAM OF SINUSES: CPT | Mod: TC

## 2022-05-18 NOTE — TELEPHONE ENCOUNTER
----- Message from Genia Galicia sent at 5/18/2022  9:33 AM CDT -----  Contact: IZABELLA SHEA [[id number]]  Type:  Needs Medical Advice    Who Called: IZABELLA SHEA [[id number]]  Symptoms (please be specific):   How long has patient had these symptoms:    Pharmacy name and phone #:    Would the patient rather a call back or a response via [facility]? Call   Best Call Back Number: [phone] (home)    Additional Information: Pt is requesting a callback in regards to pt having the same problem after being prescribed two prescriptions please

## 2022-05-19 ENCOUNTER — TELEPHONE (OUTPATIENT)
Dept: OTOLARYNGOLOGY | Facility: CLINIC | Age: 77
End: 2022-05-19
Payer: MEDICARE

## 2022-05-19 RX ORDER — CEFDINIR 300 MG/1
300 CAPSULE ORAL 2 TIMES DAILY
Qty: 20 CAPSULE | Refills: 0 | Status: SHIPPED | OUTPATIENT
Start: 2022-05-19 | End: 2022-05-29

## 2022-05-19 NOTE — TELEPHONE ENCOUNTER
"Spoke with pt, advised xray shows presistant sinus infection and to keep appointment with Nola next week. Pt stated she did not get relief from levaquin. Inquired about previous Omnicef usage, pt stated she didn't think she had used before but willing to try. Also stated she is not really "allergic" to sulfa drugs. Willing to try also if Yasmeen agrees. Notified Yasmeen of pt responses.   "

## 2022-05-19 NOTE — TELEPHONE ENCOUNTER
No improvement with Levaquin x 7 days.  Will send in Omnicef x 10 days.  She should stop the medication immediately and call us if any signs of allergic reaction.

## 2022-05-19 NOTE — TELEPHONE ENCOUNTER
Please call to let patient know that her recent sinus xrays show persistent infection.  Has she noticed any improvement with Levaquin?  If so, I will extend that course another 7 days.  She has multiple drug allergies which limits antibiotic options.  Has she ever taken Omnicef (Cefdinir) and did she tolerate if so?  Keep scheduled followup with Nola next week.

## 2022-05-19 NOTE — TELEPHONE ENCOUNTER
Notified pt of estelle's new order. Instructed to call if showing signs of allergic reaction. Verbalized understanding, thanked for calling

## 2022-05-20 ENCOUNTER — TELEPHONE (OUTPATIENT)
Dept: PAIN MEDICINE | Facility: CLINIC | Age: 77
End: 2022-05-20
Payer: MEDICARE

## 2022-05-20 NOTE — TELEPHONE ENCOUNTER
Got in contact with pt in regards to her trigger point injection today. Pt stated she is taking Prednisone and was wondering if she could still get the injection. Alanis Peres PA-C stated that she could still come in they might alter what is inside the injection. I reached out to Dr. Fulton to make sure that was ok.

## 2022-05-20 NOTE — TELEPHONE ENCOUNTER
Informed pt that per Dr. Fulton the trigger point injection will not have any steroid which means the effect will be short lived. Pt said ok and that she was on her way over.

## 2022-05-20 NOTE — TELEPHONE ENCOUNTER
----- Message from Nikkie Gee sent at 5/20/2022 10:54 AM CDT -----  .Type:  Needs Medical Advice    Who Called: IZABELLA SHEA [[id number]]  Symptoms (please be specific):   How long has patient had these symptoms:    Pharmacy name and phone #:    Would the patient rather a call back or a response via [facility]?   Best Call Back Number:  [phone]  Additional Information:  Pt called stating she will hold off on injection. Please call.

## 2022-05-20 NOTE — TELEPHONE ENCOUNTER
Pt stated she wanted to wait to get the injection. I informed pt that Dr. Fulton is leaving. Pt stated she would see another provider. Rescheduled appoint with Dr. Oshea in August.

## 2022-05-23 ENCOUNTER — TELEPHONE (OUTPATIENT)
Dept: OTOLARYNGOLOGY | Facility: CLINIC | Age: 77
End: 2022-05-23
Payer: MEDICARE

## 2022-05-23 NOTE — TELEPHONE ENCOUNTER
Pt advised she is feeling better. Requested to have follow up moved 1 week out. Rescheduled to 5/30.

## 2022-05-23 NOTE — TELEPHONE ENCOUNTER
----- Message from Archana Gibson sent at 5/23/2022  7:41 AM CDT -----  Regarding: Advice  Contact: Patient  Patient would like Elizabeth to give a call back concerning her appointment today, and whether or not she need to come in for the appointment, she is doing better and still have medication left and would like to come in once she finish the medication. Please call to advise at ph .[phone] (home)

## 2022-05-30 NOTE — PROGRESS NOTES
Referring Provider:    No referring provider defined for this encounter.  Subjective:   Patient: Shireen Felix [id number], :1945   Visit date:2022 10:00 AM    Chief Complaint:  Sinus Problem (F/u sinus)    HPI:    Prior notes reviewed by myself.  Clinical documentation obtained by nursing staff reviewed.     22 - reported relief in pressure in her face but still with some in bilateral cheeks. Still with draining, discolored, PND, rhinorrhea. Persistent productive cough. Follows with pulm for asthma. Using flonase and taking mucinex.   Hx of septoplasty many years ago w/ residual perf.     22 - sinusitis tx with azithromycin and medrol. W/u with sinus xray was significant for maxillary sinusitis, pt completed tx with Levaquin and cefdinir.   Former smoker, quit in .       Objective:     Physical Exam:  Vitals:  /68   Pulse 76   Temp 97.9 °F (36.6 °C) (Temporal)   LMP  (LMP Unknown)   General appearance:  Well developed, well nourished    Ears:  Otoscopy of external auditory canals and tympanic membranes was normal, clinical speech reception thresholds grossly intact, no mass/lesion of auricle.    Nose:  No masses/lesions of external nose, nasal mucosa. Anterior septum perforation with crusting.     Mouth:  No mass/lesion of lips, teeth, gums, hard/soft palate, tongue, tonsils, or oropharynx.    Neck & Lymphatics:  No cervical lymphadenopathy, no neck mass/crepitus/ asymmetry, trachea is midline, no thyroid enlargement/tenderness/mass.        Assessment & Plan:   Chronic maxillary sinusitis  -     CT Sinuses without Contrast; Future; Expected date: 2022    Other orders  -     levocetirizine (XYZAL) 5 MG tablet; Take 1 tablet (5 mg total) by mouth every evening.  Dispense: 30 tablet; Refill: 11    Ct sinus - will contact pt with results and further tx recommendations. If insignificant f/u with pulm  Added xyzal    Thank you for allowing me to participate in the care of  Shireen.        Mariama Sam PA-C  [facility]   [facility]  [address]  [place] [address]  P: [phone]  F: [phone]

## 2022-05-31 DIAGNOSIS — I10 PRIMARY HYPERTENSION: Primary | ICD-10-CM

## 2022-06-01 ENCOUNTER — OFFICE VISIT (OUTPATIENT)
Dept: ORTHOPEDICS | Facility: CLINIC | Age: 77
End: 2022-06-01
Payer: MEDICARE

## 2022-06-01 ENCOUNTER — OFFICE VISIT (OUTPATIENT)
Dept: OTOLARYNGOLOGY | Facility: CLINIC | Age: 77
End: 2022-06-01
Payer: MEDICARE

## 2022-06-01 VITALS
HEIGHT: 64 IN | HEART RATE: 76 BPM | BODY MASS INDEX: 22.88 KG/M2 | TEMPERATURE: 98 F | DIASTOLIC BLOOD PRESSURE: 68 MMHG | WEIGHT: 134 LBS | SYSTOLIC BLOOD PRESSURE: 124 MMHG

## 2022-06-01 DIAGNOSIS — M19.049 LOCALIZED PRIMARY OSTEOARTHRITIS OF CARPOMETACARPAL (CMC) JOINT, UNSPECIFIED LATERALITY: Primary | ICD-10-CM

## 2022-06-01 DIAGNOSIS — J32.0 CHRONIC MAXILLARY SINUSITIS: ICD-10-CM

## 2022-06-01 PROCEDURE — 99999 PR PBB SHADOW E&M-EST. PATIENT-LVL IV: CPT | Mod: PBBFAC,,, | Performed by: ORTHOPAEDIC SURGERY

## 2022-06-01 PROCEDURE — 1100F PTFALLS ASSESS-DOCD GE2>/YR: CPT | Mod: CPTII,S$GLB,, | Performed by: ORTHOPAEDIC SURGERY

## 2022-06-01 PROCEDURE — 1101F PR PT FALLS ASSESS DOC 0-1 FALLS W/OUT INJ PAST YR: ICD-10-PCS | Mod: CPTII,S$GLB,, | Performed by: PHYSICIAN ASSISTANT

## 2022-06-01 PROCEDURE — 99213 OFFICE O/P EST LOW 20 MIN: CPT | Mod: S$GLB,,, | Performed by: PHYSICIAN ASSISTANT

## 2022-06-01 PROCEDURE — 99213 PR OFFICE/OUTPT VISIT, EST, LEVL III, 20-29 MIN: ICD-10-PCS | Mod: 25,S$GLB,, | Performed by: ORTHOPAEDIC SURGERY

## 2022-06-01 PROCEDURE — 1159F MED LIST DOCD IN RCRD: CPT | Mod: CPTII,S$GLB,, | Performed by: PHYSICIAN ASSISTANT

## 2022-06-01 PROCEDURE — 1100F PR PT FALLS ASSESS DOC 2+ FALLS/FALL W/INJURY/YR: ICD-10-PCS | Mod: CPTII,S$GLB,, | Performed by: ORTHOPAEDIC SURGERY

## 2022-06-01 PROCEDURE — 3074F SYST BP LT 130 MM HG: CPT | Mod: CPTII,S$GLB,, | Performed by: PHYSICIAN ASSISTANT

## 2022-06-01 PROCEDURE — 3288F PR FALLS RISK ASSESSMENT DOCUMENTED: ICD-10-PCS | Mod: CPTII,S$GLB,, | Performed by: ORTHOPAEDIC SURGERY

## 2022-06-01 PROCEDURE — 3072F PR LOW RISK FOR RETINOPATHY: ICD-10-PCS | Mod: CPTII,S$GLB,, | Performed by: ORTHOPAEDIC SURGERY

## 2022-06-01 PROCEDURE — 3078F DIAST BP <80 MM HG: CPT | Mod: CPTII,S$GLB,, | Performed by: PHYSICIAN ASSISTANT

## 2022-06-01 PROCEDURE — 3072F LOW RISK FOR RETINOPATHY: CPT | Mod: CPTII,S$GLB,, | Performed by: PHYSICIAN ASSISTANT

## 2022-06-01 PROCEDURE — 3072F PR LOW RISK FOR RETINOPATHY: ICD-10-PCS | Mod: CPTII,S$GLB,, | Performed by: PHYSICIAN ASSISTANT

## 2022-06-01 PROCEDURE — 1125F AMNT PAIN NOTED PAIN PRSNT: CPT | Mod: CPTII,S$GLB,, | Performed by: ORTHOPAEDIC SURGERY

## 2022-06-01 PROCEDURE — 3074F PR MOST RECENT SYSTOLIC BLOOD PRESSURE < 130 MM HG: ICD-10-PCS | Mod: CPTII,S$GLB,, | Performed by: PHYSICIAN ASSISTANT

## 2022-06-01 PROCEDURE — 20600 DRAIN/INJ JOINT/BURSA W/O US: CPT | Mod: 50,S$GLB,,

## 2022-06-01 PROCEDURE — 3288F FALL RISK ASSESSMENT DOCD: CPT | Mod: CPTII,S$GLB,, | Performed by: ORTHOPAEDIC SURGERY

## 2022-06-01 PROCEDURE — 99999 PR PBB SHADOW E&M-EST. PATIENT-LVL IV: ICD-10-PCS | Mod: PBBFAC,,, | Performed by: ORTHOPAEDIC SURGERY

## 2022-06-01 PROCEDURE — 1126F PR PAIN SEVERITY QUANTIFIED, NO PAIN PRESENT: ICD-10-PCS | Mod: CPTII,S$GLB,, | Performed by: PHYSICIAN ASSISTANT

## 2022-06-01 PROCEDURE — 20600 SMALL JOINT ASPIRATION/INJECTION: R THUMB CMC, L THUMB CMC: ICD-10-PCS | Mod: 50,S$GLB,,

## 2022-06-01 PROCEDURE — 1159F PR MEDICATION LIST DOCUMENTED IN MEDICAL RECORD: ICD-10-PCS | Mod: CPTII,S$GLB,, | Performed by: PHYSICIAN ASSISTANT

## 2022-06-01 PROCEDURE — 3072F LOW RISK FOR RETINOPATHY: CPT | Mod: CPTII,S$GLB,, | Performed by: ORTHOPAEDIC SURGERY

## 2022-06-01 PROCEDURE — 99213 PR OFFICE/OUTPT VISIT, EST, LEVL III, 20-29 MIN: ICD-10-PCS | Mod: S$GLB,,, | Performed by: PHYSICIAN ASSISTANT

## 2022-06-01 PROCEDURE — 99999 PR PBB SHADOW E&M-EST. PATIENT-LVL IV: ICD-10-PCS | Mod: PBBFAC,,, | Performed by: PHYSICIAN ASSISTANT

## 2022-06-01 PROCEDURE — 1126F AMNT PAIN NOTED NONE PRSNT: CPT | Mod: CPTII,S$GLB,, | Performed by: PHYSICIAN ASSISTANT

## 2022-06-01 PROCEDURE — 3078F PR MOST RECENT DIASTOLIC BLOOD PRESSURE < 80 MM HG: ICD-10-PCS | Mod: CPTII,S$GLB,, | Performed by: PHYSICIAN ASSISTANT

## 2022-06-01 PROCEDURE — 99213 OFFICE O/P EST LOW 20 MIN: CPT | Mod: 25,S$GLB,, | Performed by: ORTHOPAEDIC SURGERY

## 2022-06-01 PROCEDURE — 99999 PR PBB SHADOW E&M-EST. PATIENT-LVL IV: CPT | Mod: PBBFAC,,, | Performed by: PHYSICIAN ASSISTANT

## 2022-06-01 PROCEDURE — 3288F FALL RISK ASSESSMENT DOCD: CPT | Mod: CPTII,S$GLB,, | Performed by: PHYSICIAN ASSISTANT

## 2022-06-01 PROCEDURE — 3288F PR FALLS RISK ASSESSMENT DOCUMENTED: ICD-10-PCS | Mod: CPTII,S$GLB,, | Performed by: PHYSICIAN ASSISTANT

## 2022-06-01 PROCEDURE — 1101F PT FALLS ASSESS-DOCD LE1/YR: CPT | Mod: CPTII,S$GLB,, | Performed by: PHYSICIAN ASSISTANT

## 2022-06-01 PROCEDURE — 1125F PR PAIN SEVERITY QUANTIFIED, PAIN PRESENT: ICD-10-PCS | Mod: CPTII,S$GLB,, | Performed by: ORTHOPAEDIC SURGERY

## 2022-06-01 RX ORDER — LEVOCETIRIZINE DIHYDROCHLORIDE 5 MG/1
5 TABLET, FILM COATED ORAL NIGHTLY
Qty: 30 TABLET | Refills: 11 | Status: SHIPPED | OUTPATIENT
Start: 2022-06-01 | End: 2023-07-12 | Stop reason: ALTCHOICE

## 2022-06-01 RX ORDER — TRIAMCINOLONE ACETONIDE 40 MG/ML
40 INJECTION, SUSPENSION INTRA-ARTICULAR; INTRAMUSCULAR
Status: DISCONTINUED | OUTPATIENT
Start: 2022-06-01 | End: 2022-06-01 | Stop reason: HOSPADM

## 2022-06-01 RX ADMIN — TRIAMCINOLONE ACETONIDE 40 MG: 40 INJECTION, SUSPENSION INTRA-ARTICULAR; INTRAMUSCULAR at 10:06

## 2022-06-01 NOTE — PROCEDURES
Small Joint Aspiration/Injection: R thumb CMC, L thumb CMC    Date/Time: 6/1/2022 10:15 AM  Performed by: Jennifer Atkins PA-C  Authorized by: Jennifer Atkins PA-C     Consent Done?:  Yes (Verbal)  Indications:  Pain and arthritis  Site marked: the procedure site was marked    Timeout: prior to procedure the correct patient, procedure, and site was verified    Prep: patient was prepped and draped in usual sterile fashion      Local anesthesia used?: Yes    Local anesthetic:  Lidocaine 2% without epinephrine  Anesthetic total (ml):  1    Location:  Thumb  Site:  R thumb CMC and L thumb CMC  Ultrasonic guidance for needle placement?: No    Needle size:  25 G  Medications:  40 mg triamcinolone acetonide 40 mg/mL  Patient tolerance:  Patient tolerated the procedure well with no immediate complications

## 2022-06-02 ENCOUNTER — OFFICE VISIT (OUTPATIENT)
Dept: CARDIOLOGY | Facility: CLINIC | Age: 77
End: 2022-06-02
Attending: STUDENT IN AN ORGANIZED HEALTH CARE EDUCATION/TRAINING PROGRAM
Payer: MEDICARE

## 2022-06-02 ENCOUNTER — TELEPHONE (OUTPATIENT)
Dept: CARDIOLOGY | Facility: CLINIC | Age: 77
End: 2022-06-02

## 2022-06-02 ENCOUNTER — HOSPITAL ENCOUNTER (OUTPATIENT)
Dept: RADIOLOGY | Facility: HOSPITAL | Age: 77
Discharge: HOME | End: 2022-06-02
Attending: PHYSICIAN ASSISTANT
Payer: MEDICARE

## 2022-06-02 ENCOUNTER — HOSPITAL ENCOUNTER (OUTPATIENT)
Dept: CARDIOLOGY | Facility: HOSPITAL | Age: 77
Discharge: HOME | End: 2022-06-02
Attending: INTERNAL MEDICINE
Payer: MEDICARE

## 2022-06-02 VITALS
DIASTOLIC BLOOD PRESSURE: 77 MMHG | HEIGHT: 64 IN | OXYGEN SATURATION: 100 % | SYSTOLIC BLOOD PRESSURE: 149 MMHG | BODY MASS INDEX: 23.45 KG/M2 | WEIGHT: 137.38 LBS | HEART RATE: 84 BPM

## 2022-06-02 DIAGNOSIS — I25.118 CORONARY ARTERY DISEASE OF NATIVE ARTERY OF NATIVE HEART WITH STABLE ANGINA PECTORIS: ICD-10-CM

## 2022-06-02 DIAGNOSIS — I25.10 CAD IN NATIVE ARTERY: ICD-10-CM

## 2022-06-02 DIAGNOSIS — I15.2 HYPERTENSION ASSOCIATED WITH DIABETES: ICD-10-CM

## 2022-06-02 DIAGNOSIS — I10 PRIMARY HYPERTENSION: Primary | ICD-10-CM

## 2022-06-02 DIAGNOSIS — N18.30 CONTROLLED TYPE 2 DIABETES MELLITUS WITH STAGE 3 CHRONIC KIDNEY DISEASE, WITHOUT LONG-TERM CURRENT USE OF INSULIN: ICD-10-CM

## 2022-06-02 DIAGNOSIS — R00.2 PALPITATIONS: ICD-10-CM

## 2022-06-02 DIAGNOSIS — J32.0 CHRONIC MAXILLARY SINUSITIS: ICD-10-CM

## 2022-06-02 DIAGNOSIS — Z86.73 HISTORY OF STROKE: ICD-10-CM

## 2022-06-02 DIAGNOSIS — E11.59 HYPERTENSION ASSOCIATED WITH DIABETES: ICD-10-CM

## 2022-06-02 DIAGNOSIS — E11.22 CONTROLLED TYPE 2 DIABETES MELLITUS WITH STAGE 3 CHRONIC KIDNEY DISEASE, WITHOUT LONG-TERM CURRENT USE OF INSULIN: ICD-10-CM

## 2022-06-02 DIAGNOSIS — I99.8 FLUCTUATING BLOOD PRESSURE: ICD-10-CM

## 2022-06-02 DIAGNOSIS — E78.2 MIXED HYPERLIPIDEMIA: ICD-10-CM

## 2022-06-02 DIAGNOSIS — I10 PRIMARY HYPERTENSION: ICD-10-CM

## 2022-06-02 PROCEDURE — 3078F PR MOST RECENT DIASTOLIC BLOOD PRESSURE < 80 MM HG: ICD-10-PCS | Mod: CPTII,S$GLB,, | Performed by: INTERNAL MEDICINE

## 2022-06-02 PROCEDURE — 1160F RVW MEDS BY RX/DR IN RCRD: CPT | Mod: CPTII,S$GLB,, | Performed by: INTERNAL MEDICINE

## 2022-06-02 PROCEDURE — 1159F PR MEDICATION LIST DOCUMENTED IN MEDICAL RECORD: ICD-10-PCS | Mod: CPTII,S$GLB,, | Performed by: INTERNAL MEDICINE

## 2022-06-02 PROCEDURE — 3077F SYST BP >= 140 MM HG: CPT | Mod: CPTII,S$GLB,, | Performed by: INTERNAL MEDICINE

## 2022-06-02 PROCEDURE — 93005 ELECTROCARDIOGRAM TRACING: CPT

## 2022-06-02 PROCEDURE — 1159F MED LIST DOCD IN RCRD: CPT | Mod: CPTII,S$GLB,, | Performed by: INTERNAL MEDICINE

## 2022-06-02 PROCEDURE — 3077F PR MOST RECENT SYSTOLIC BLOOD PRESSURE >= 140 MM HG: ICD-10-PCS | Mod: CPTII,S$GLB,, | Performed by: INTERNAL MEDICINE

## 2022-06-02 PROCEDURE — 1126F AMNT PAIN NOTED NONE PRSNT: CPT | Mod: CPTII,S$GLB,, | Performed by: INTERNAL MEDICINE

## 2022-06-02 PROCEDURE — 99214 OFFICE O/P EST MOD 30 MIN: CPT | Mod: S$GLB,,, | Performed by: INTERNAL MEDICINE

## 2022-06-02 PROCEDURE — 3072F LOW RISK FOR RETINOPATHY: CPT | Mod: CPTII,S$GLB,, | Performed by: INTERNAL MEDICINE

## 2022-06-02 PROCEDURE — 1126F PR PAIN SEVERITY QUANTIFIED, NO PAIN PRESENT: ICD-10-PCS | Mod: CPTII,S$GLB,, | Performed by: INTERNAL MEDICINE

## 2022-06-02 PROCEDURE — 99999 PR PBB SHADOW E&M-EST. PATIENT-LVL V: ICD-10-PCS | Mod: PBBFAC,,, | Performed by: INTERNAL MEDICINE

## 2022-06-02 PROCEDURE — 99214 PR OFFICE/OUTPT VISIT, EST, LEVL IV, 30-39 MIN: ICD-10-PCS | Mod: S$GLB,,, | Performed by: INTERNAL MEDICINE

## 2022-06-02 PROCEDURE — 99999 PR PBB SHADOW E&M-EST. PATIENT-LVL V: CPT | Mod: PBBFAC,,, | Performed by: INTERNAL MEDICINE

## 2022-06-02 PROCEDURE — 1160F PR REVIEW ALL MEDS BY PRESCRIBER/CLIN PHARMACIST DOCUMENTED: ICD-10-PCS | Mod: CPTII,S$GLB,, | Performed by: INTERNAL MEDICINE

## 2022-06-02 PROCEDURE — 3072F PR LOW RISK FOR RETINOPATHY: ICD-10-PCS | Mod: CPTII,S$GLB,, | Performed by: INTERNAL MEDICINE

## 2022-06-02 PROCEDURE — 70486 CT MAXILLOFACIAL W/O DYE: CPT | Mod: TC

## 2022-06-02 PROCEDURE — 93010 ELECTROCARDIOGRAM REPORT: CPT | Mod: ,,, | Performed by: INTERNAL MEDICINE

## 2022-06-02 PROCEDURE — 3078F DIAST BP <80 MM HG: CPT | Mod: CPTII,S$GLB,, | Performed by: INTERNAL MEDICINE

## 2022-06-02 PROCEDURE — 93010 EKG 12-LEAD: ICD-10-PCS | Mod: ,,, | Performed by: INTERNAL MEDICINE

## 2022-06-02 RX ORDER — DILTIAZEM HYDROCHLORIDE EXTENDED-RELEASE TABLETS 180 MG/1
180 TABLET, EXTENDED RELEASE ORAL DAILY
Qty: 90 TABLET | Refills: 1 | Status: SHIPPED | OUTPATIENT
Start: 2022-06-02 | End: 2022-06-17

## 2022-06-02 NOTE — PROGRESS NOTES
Subjective:   Patient ID:  Shireen Felix is a 76 y.o. female who presents for cardiac consult of No chief complaint on file.      Follow-up  Associated symptoms include chest pain and coughing.   Cough  Associated symptoms include chest pain.   Coronary Artery Disease  Symptoms include chest pain.     The patient came in today for cardiac consult of No chief complaint on file.      Shireen Felix is a 76 y.o. female pt with CHF, CAD, CVI, prior CVA, seizure disorder, DM type II, HTN, and ANDREI here for follow up CV eval.     9/12/19  She had a fall last month was admitted to [facility] and had cardiac eval for CP with min elevated trop 1st set, ECHO normal, pain thought due to GI etiology.   She has been doing a lot of strenous activity and has been getting more SOB and CP with it. CP feels like pressure, worse with being more tired.   Had recent pulm eval had nodule, had overall stable lung capacity.   Prior CV - Dr. Garcias    1/23/20  Nuclear stress neg for ischemia. Holter neg. Added Dilt last visit. She has been going through PT for legs back but now pain is worse and will need to get Xrays. She also had cellulitis of hands/arms was on IV abx. Gets more tired/SOB more lately.     5/19/21  Follow up since 1/2020. She had some pulm issues as well as back pain. NO hospitalizations or surgeries. Had COVID vaccines. She has been having palpitations along with  Chest pain towards throat/back, feels like angina.   ECG - NSR, LAD, septal infarct,      7/28/21  BP and HR well controlled today. She had a death in family had to resched the cardiac testing. She continues to have SOB with palpitations. Feels full and pressure. She has not been to neuro yet.     9/10/21  Told pt - Holter revealed elevated heart rates with occasional PVCS and fast rhythms - increase metoprolol to 50mg twice a day and continue Diltiazem 120mg, monitor Bp and heart rates, follow up with me in 2-3 weeks.  Nuclear stress and ECHO neg.  She had  recent sinus infection she has been fighting. BP today stable. Had more edema at times.     6/2/22  She had COVID 19 twice so far, had infusion therapy. Had labile BP and HR. She has more sinus issues and has been seeing ENT as well. Has more palpitations.   ECG - NSR, LAD, LBBB    Patient feels no PND, no syncope, no CNS symptoms.    Patient has fairly good exercise tolerance.    Patient is compliant with medications.    ECG - sinus tach V rate 124    HOLTER  Sinus rhythm with heart rates varying between 75 and 136 BPM with an average of 95 BPM.     Maximum heart rate recorded at: 13:43 CDT on day 2.     Minimum heart rate recorded at 02:25 CDT on day 1.   PVC    Ventricular Arrhythmias  There were rare PVCs totalling 510 and averaging 5.38 per hour. There were 6 couplets. There were 9 triplets.   PAC    Supraventricular Arrhythmias  There were very rare PACs totalling 37 and averaging 0.39 per hour.         Results for orders placed during the hospital encounter of 08/17/21    Echo Color Flow Doppler? Yes    Interpretation Summary  · The left ventricle is normal in size with concentric hypertrophy and normal systolic function.  · The estimated ejection fraction is 60%.  · Normal left ventricular diastolic function.  · With normal right ventricular systolic function.    Results for orders placed during the hospital encounter of 08/17/21    Nuclear Stress - Cardiology Interpreted    Interpretation Summary    Normal myocardial perfusion scan. There is no evidence of myocardial ischemia or infarction.    The gated perfusion images showed an ejection fraction of 69% at rest. The gated perfusion images showed an ejection fraction of 58% post stress.    There is normal wall motion at rest and post stress.    LV cavity size is normal at rest and normal at stress.    The EKG portion of this study is negative for ischemia.    The patient reported no chest pain during the stress test.    During stress, rare PVCs are  noted.      Past Medical History:   Diagnosis Date    Abnormal ECG 8/5/2019    Aneurysm     Anticoagulant long-term use     Arthritis     CAD in native artery 8/5/2019    COPD (chronic obstructive pulmonary disease)     Diabetes mellitus     Fall 10/10/2019    Formatting of this note might be different from the original. Found sitting on floor next to bed last night Mild confusion today Does not recall falling or how she ended up on floor UA today Labs yesterday unremarkable    Glaucoma     Hypertension     Seizures     Shingles 05/27/2017    Stroke        Past Surgical History:   Procedure Laterality Date    BRAIN SURGERY      CARDIAC CATHETERIZATION      CORONARY ANGIOPLASTY      EPIDURAL STEROID INJECTION INTO LUMBAR SPINE Bilateral 11/12/2019    Procedure: TF XANDER L4/5;  Surgeon: Desean Dias MD;  Location: [facility];  Service: Pain Management;  Laterality: Bilateral;    HYSTERECTOMY      INJECTION OF ANESTHETIC AGENT AROUND MEDIAL BRANCH NERVES INNERVATING LUMBAR FACET JOINT Bilateral 1/31/2020    Procedure: Bilateral L3-5 MBB;  Surgeon: Desean Dias MD;  Location: [facility];  Service: Pain Management;  Laterality: Bilateral;    INJECTION OF ANESTHETIC AGENT INTO SACROILIAC JOINT Right 11/16/2021    Procedure: Right BLOCK, SACROILIAC JOINT Right GTB with RN IV sedation;  Surgeon: Yao Fulton MD;  Location: [facility];  Service: Pain Management;  Laterality: Right;    INJECTION OF JOINT Bilateral 7/9/2020    Procedure: Bilateral shoulder GH Joint injection with local;  Surgeon: Desean Dias MD;  Location: [facility];  Service: Pain Management;  Laterality: Bilateral;    TRANSFORAMINAL EPIDURAL INJECTION OF STEROID Bilateral 7/23/2019    Procedure: Bilateral L3/4 Transforaminal Epidural Steroid Injection;  Surgeon: Desean Dias MD;  Location: [facility];  Service: Pain Management;  Laterality: Bilateral;    TRANSFORAMINAL EPIDURAL INJECTION OF STEROID  Bilateral 3/10/2020    Procedure: Right T12/L1 TF XANDER with local;  Surgeon: Desean Dias MD;  [facility];  Service: Pain Management;  Laterality: Bilateral;    TRANSFORAMINAL EPIDURAL INJECTION OF STEROID Right 2020    Procedure: Right T12/L1 TFESI Covid day of procedure;  Surgeon: Desean Dias MD;  Location: [facility];  Service: Pain Management;  Laterality: Right;       Social History     Tobacco Use    Smoking status: Former Smoker     Packs/day: 1.00     Years: 10.00     Pack years: 10.00     Types: Cigarettes     Quit date: 2004     Years since quittin.1    Smokeless tobacco: Never Used   Substance Use Topics    Alcohol use: No    Drug use: Never       Family History   Problem Relation Age of Onset    No Known Problems Mother     No Known Problems Father        Patient's Medications   New Prescriptions    No medications on file   Previous Medications    ACETAMINOPHEN (TYLENOL) 500 MG TABLET    Take 500 mg by mouth as needed for Pain.    ALBUTEROL (PROVENTIL) 2.5 MG /3 ML (0.083 %) NEBULIZER SOLUTION    Take 3 mLs (2.5 mg total) by nebulization every 4 (four) hours as needed. Rescue    ALBUTEROL (PROVENTIL) 2.5 MG /3 ML (0.083 %) NEBULIZER SOLUTION    Take 2.5 mg by nebulization every 4 (four) hours as needed for Wheezing. Rescue    ALBUTEROL (PROVENTIL/VENTOLIN HFA) 90 MCG/ACTUATION INHALER    INHALE 2 PUFFS INTO THE LUNGS EVERY 4 HOURS AS NEEDED    AMLODIPINE (NORVASC) 10 MG TABLET    Take 1 tablet (10 mg total) by mouth once daily.    APTIOM 400 MG TAB TABLET    TAKE 1 TABLET BY MOUTH EVERY EVENING WITH 800MG TABLET    APTIOM 800 MG TAB    TAKE 1 TABLET BY MOUTH EVERY EVENING WITH 400MG TABLET    ATORVASTATIN (LIPITOR) 40 MG TABLET    TAKE 1 TABLET BY MOUTH ONCE DAILY    AZITHROMYCIN (Z-EMEKA) 250 MG TABLET    Take 2 tablets by mouth on day 1; Take 1 tablet by mouth on days 2-5    AZITHROMYCIN (Z-EMEKA) 250 MG TABLET    Take as directed    CHOLECALCIFEROL, VITAMIN  D3, 1,250 MCG (50,000 UNIT) CAPSULE    Take 1 capsule (50,000 Units total) by mouth every 7 days.    CLOPIDOGREL (PLAVIX) 75 MG TABLET    TAKE 1 TABLET BY MOUTH ONCE DAILY    DALIRESP 500 MCG TAB    TAKE 1 TABLET BY MOUTH ONCE DAILY    DENOSUMAB (PROLIA) 60 MG/ML SYRG    every 6 (six) months.    DEXILANT 60 MG CAPSULE    Take 1 capsule by mouth once daily.    DICLOFENAC (VOLTAREN) 25 MG TBEC    TAKE 1 TABLET BY MOUTH TWICE DAILY    DILTIAZEM (CARDIZEM LA) 120 MG 24 HR TABLET    Take 1 tablet (120 mg total) by mouth once daily.    ESCITALOPRAM OXALATE (LEXAPRO) 20 MG TABLET    TAKE 1 TABLET BY MOUTH once a day    FLUTICASONE PROPIONATE (FLONASE) 50 MCG/ACTUATION NASAL SPRAY    2 sprays (100 mcg total) by Each Nostril route once daily at 6am.    FLUTICASONE-UMECLIDIN-VILANTER (TRELEGY ELLIPTA) 200-62.5-25 MCG INHALER    Inhale 1 puff into the lungs once daily.    GABAPENTIN (NEURONTIN) 100 MG CAPSULE    Take 100 mg by mouth 2 (two) times daily as needed.    HYDROCHLOROTHIAZIDE (HYDRODIURIL) 25 MG TABLET    TAKE 1 TABLET BY MOUTH ONCE DAILY AS NEEDED    IPRATROPIUM (ATROVENT) 42 MCG (0.06 %) NASAL SPRAY    2 sprays by Nasal route 4 (four) times daily.    KETOCONAZOLE (NIZORAL) 2 % CREAM    Apply topically 2 (two) times daily. For dry flaky patches of ear.    LATANOPROST 0.005 % OPHTHALMIC SOLUTION    Place 1 drop into both eyes every evening.    LEVOCETIRIZINE (XYZAL) 5 MG TABLET    Take 1 tablet (5 mg total) by mouth every evening.    METFORMIN (GLUCOPHAGE) 500 MG TABLET    TAKE 1 TABLET BY MOUTH ONCE DAILY    METOPROLOL TARTRATE (LOPRESSOR) 50 MG TABLET    Take 1 tablet (50 mg total) by mouth 2 (two) times daily.    MIRABEGRON (MYRBETRIQ) 50 MG TB24    Take 1 tablet (50 mg total) by mouth once daily.    MONTELUKAST (SINGULAIR) 10 MG TABLET    TAKE 1 TABLET BY MOUTH ONCE DAILY    NITROGLYCERIN (NITROSTAT) 0.4 MG SL TABLET    DISSOLVE 1 TABLET UNDER TONGUE EVERY 5 MINUTES FOR CHEST PAIN (MAY TAKE UP TO 3 DOSES THEN  SEEK MEDICAL ATTENTION)    OMEPRAZOLE (PRILOSEC) 40 MG CAPSULE    TAKE 1 CAPSULE BY MOUTH ONCE DAILY IN THE MORNING    PREDNISONE (DELTASONE) 5 MG TABLET    4 tabs x 5 days, then 3 tabs x 5 days, then 2 tabs x 5 days, then 5 mg daily x 15 days    RANOLAZINE (RANEXA) 1,000 MG TB12    TAKE 1 TABLET BY MOUTH TWICE DAILY    TIMOLOL MALEATE 0.5% (TIMOPTIC) 0.5 % DROP    Place 1 drop into both eyes every morning.    TIZANIDINE (ZANAFLEX) 4 MG TABLET    TAKE 1 TABLET BY MOUTH TWICE DAILY AS NEEDED    TRAMADOL (ULTRAM) 50 MG TABLET    Take 1 tablet (50 mg total) by mouth every 8 (eight) hours as needed for Pain.    TRIAMCINOLONE ACETONIDE 0.025% (KENALOG) 0.025 % CREAM    Apply topically 2 (two) times daily. PRN rash and itching of ear. Mild steroid cream.    VALSARTAN (DIOVAN) 320 MG TABLET    Take 1 tablet (320 mg total) by mouth once daily.   Modified Medications    No medications on file   Discontinued Medications    No medications on file       Review of Systems   Constitutional: Negative.    HENT: Negative.    Eyes: Negative.    Respiratory: Positive for cough.    Cardiovascular: Positive for chest pain.   Gastrointestinal: Negative.    Genitourinary: Negative.    Musculoskeletal: Negative.    Skin: Negative.    Neurological: Negative.    Endo/Heme/Allergies: Negative.    Psychiatric/Behavioral: Negative.    All 12 systems otherwise negative.      Wt Readings from Last 3 Encounters:   06/01/22 60.8 kg (134 lb)   05/16/22 60.8 kg (134 lb 0.6 oz)   05/09/22 60.8 kg (134 lb 0.6 oz)     Temp Readings from Last 3 Encounters:   06/01/22 97.9 °F (36.6 °C) (Temporal)   05/09/22 98.6 °F (37 °C) (Temporal)   05/04/22 98 °F (36.7 °C) (Tympanic)     BP Readings from Last 3 Encounters:   06/01/22 124/68   05/16/22 104/74   05/09/22 (!) 155/76     Pulse Readings from Last 3 Encounters:   06/01/22 76   05/16/22 74   05/09/22 84       LMP  (LMP Unknown)     Objective:   Physical Exam  Vitals and nursing note reviewed.    Constitutional:       General: She is not in acute distress.     Appearance: She is well-developed. She is not diaphoretic.   HENT:      Head: Normocephalic and atraumatic.      Nose: Nose normal.   Eyes:      General: No scleral icterus.     Conjunctiva/sclera: Conjunctivae normal.   Neck:      Thyroid: No thyromegaly.      Vascular: No JVD.   Cardiovascular:      Rate and Rhythm: Normal rate and regular rhythm.      Heart sounds: S1 normal and S2 normal. No murmur heard.    No friction rub. No gallop. No S3 or S4 sounds.   Pulmonary:      Effort: Pulmonary effort is normal. No respiratory distress.      Breath sounds: Normal breath sounds. No stridor. No wheezing or rales.   Chest:      Chest wall: No tenderness.   Abdominal:      General: Bowel sounds are normal. There is no distension.      Palpations: Abdomen is soft. There is no mass.      Tenderness: There is no abdominal tenderness. There is no rebound.   Genitourinary:     Comments: Deferred  Musculoskeletal:         General: No tenderness or deformity. Normal range of motion.      Cervical back: Normal range of motion and neck supple.   Lymphadenopathy:      Cervical: No cervical adenopathy.   Skin:     General: Skin is warm and dry.      Coloration: Skin is not pale.      Findings: No erythema or rash.   Neurological:      Mental Status: She is alert and oriented to person, place, and time.      Motor: No abnormal muscle tone.      Coordination: Coordination normal.   Psychiatric:         Behavior: Behavior normal.         Thought Content: Thought content normal.         Judgment: Judgment normal.         Lab Results   Component Value Date     (L) 05/09/2022    K 3.6 05/09/2022    CL 99 05/09/2022    CO2 23 05/09/2022    BUN 13 05/09/2022    CREATININE 0.7 05/09/2022     (H) 05/09/2022    HGBA1C 5.5 05/03/2022    MG 1.9 12/25/2019    AST 14 05/06/2022    ALT 22 05/06/2022    ALBUMIN 3.1 (L) 05/06/2022    PROT 6.9 05/06/2022    BILITOT 0.3  05/06/2022    WBC 10.45 01/06/2022    HGB 12.1 01/06/2022    HCT 37.0 01/06/2022    MCV 95 01/06/2022     01/06/2022    INR 1.0 11/05/2020    TSH 1.249 08/09/2021    CHOL 162 05/03/2022    HDL 71 05/03/2022    LDLCALC 81.4 05/03/2022    TRIG 48 05/03/2022    BNP 89 07/26/2019     Assessment:      1. Primary hypertension    2. Coronary artery disease of native artery of native heart with stable angina pectoris    3. Hypertension associated with diabetes    4. CAD in native artery    5. Mixed hyperlipidemia    6. Controlled type 2 diabetes mellitus with stage 3 chronic kidney disease, without long-term current use of insulin    7. Fluctuating blood pressure    8. Palpitations    9. History of stroke        Plan:   1. CAD with angina, stable   - cont plavix, BB, Ranexa  - pharm nuclear stress test - neg  - order ECHO - normal   - PRN NTG    2. HTN - with occ edema  - rec low salt diet  - cont meds  - HCTZ 25 mg PRN   - rec compression stockings    3. CHF  - ECHO normal   - order labs     4. H/o CVA/Seizures  - cont meds per neuro  - needs to f/u   - carotid u/s - normal    5. Palpitation with tachycardia  - cont Dilt - increase to 180 mg  - increased BB    6. COPD  - cont meds per PCP/pulm    7. Debility  - f/u with PT/OT      Thank you for allowing me to participate in this patient's care. Please do not hesitate to contact me with any questions or concerns. Consult note has been forwarded to the referral physician.

## 2022-06-02 NOTE — TELEPHONE ENCOUNTER
Pharmacist called from [facility] in regards to a drug interaction notification that alerted him to an interaction between Ranolazine and diltiazem. Pharmacy spoke with  and was assured that pt could safely take both meds together and pharmacist verbalized understanding.

## 2022-06-03 ENCOUNTER — TELEPHONE (OUTPATIENT)
Dept: OTOLARYNGOLOGY | Facility: CLINIC | Age: 77
End: 2022-06-03
Payer: MEDICARE

## 2022-06-03 ENCOUNTER — TELEPHONE (OUTPATIENT)
Dept: CARDIOLOGY | Facility: CLINIC | Age: 77
End: 2022-06-03
Payer: MEDICARE

## 2022-06-03 DIAGNOSIS — J30.9 CHRONIC ALLERGIC RHINITIS: Primary | ICD-10-CM

## 2022-06-03 RX ORDER — LANOLIN ALCOHOL/MO/W.PET/CERES
400 CREAM (GRAM) TOPICAL DAILY
Qty: 90 TABLET | Refills: 1 | Status: SHIPPED | OUTPATIENT
Start: 2022-06-03 | End: 2023-05-23 | Stop reason: SDUPTHER

## 2022-06-03 RX ORDER — POTASSIUM CHLORIDE 20 MEQ/1
20 TABLET, EXTENDED RELEASE ORAL DAILY
Qty: 90 TABLET | Refills: 1 | Status: SHIPPED | OUTPATIENT
Start: 2022-06-03 | End: 2022-11-03

## 2022-06-03 NOTE — TELEPHONE ENCOUNTER
----- Message from Mariama Sam PA-C sent at 6/3/2022  8:14 AM CDT -----  Please let pt know her CT is negative for a sinus infection, she does not need an antibiotic. However, it does show changes related to allergies. I placed a referral to discuss further with allergist, thank you!

## 2022-06-03 NOTE — TELEPHONE ENCOUNTER
Spoke to pt, told her result of CT and that she put in a referral to an allergist so they should be calling her sometime next week to schedule an appointment. Pt kept asking about her throat and thought [facility] was going to order imaging of that and start helping with that issue. Told her iI would message michael or another  And ask them .

## 2022-06-03 NOTE — TELEPHONE ENCOUNTER
Spoke to pt, told her the reason that flaco ordered a CT of the sinuses was because at the time of the visit that was the primary complaint, [facility] recommended her seeing an MD for a scope exam and I was calling to get that scheduled. I scheduled her with John on 6/14

## 2022-06-03 NOTE — TELEPHONE ENCOUNTER
Lvm for pt to return call to clinic in regards to results:     Please contact the patient and let them know that their labs reveal improving sodium but BNP is mildly elevated due to extra fluid, need to have low salt diet, monitor fluid status, potassium and magnesium are both low, start taking potassium and magnesium - sent to pharmacy; monitor BP and weights daily.             ----- Message from Jarrell Dye MD sent at 6/3/2022  9:26 AM CDT -----  Please contact the patient and let them know that their labs reveal improving sodium but BNP is mildly elevated due to extra fluid, need to have low salt diet, monitor fluid status, potassium and magnesium are both low, start taking potassium and magnesium - sent to pharmacy; monitor BP and weights daily.

## 2022-06-03 NOTE — TELEPHONE ENCOUNTER
----- Message from Mariama Sam PA-C sent at 6/3/2022  9:51 AM CDT -----  Hey, sarai, she had been being tx by [facility] for a sinus infection, when I saw her on 6/1 she still had sinus ssx so I w/u with CT. CT is negative. If she is having issues with throat that may be very different and I'd recommend a f/u with MD and scope. Thank you!      ----- Message -----  From: Carrie Hernandez MA  Sent: 6/3/2022   9:29 AM CDT  To: TYREE Rocha, I just called this patient, she said she thought when she went to get imaging the other day, that it was going to be of her throat because that's what has been bothering her and she thought that's what you were going to treat. She wanted to know what her sinuses had to do with her throat. I told her I wasn't sure and that I would ask you.   ----- Message -----  From: Mariama Sam PA-C  Sent: 6/3/2022   8:14 AM CDT  To: [facility] Staff    Please let pt know her CT is negative for a sinus infection, she does not need an antibiotic. However, it does show changes related to allergies. I placed a referral to discuss further with allergist, thank you!

## 2022-06-06 ENCOUNTER — TELEPHONE (OUTPATIENT)
Dept: CARDIOLOGY | Facility: CLINIC | Age: 77
End: 2022-06-06
Payer: MEDICARE

## 2022-06-06 NOTE — TELEPHONE ENCOUNTER
----- Message from Irwin Alex sent at 6/6/2022  9:38 AM CDT -----  Contact: IZABELLA SHEA [[id number]]  .Type:  Patient Returning Call    Who Called:  IZABELLA SHEA [[id number]]  Who Left Message for Patient:  nurse   Does the patient know what this is regarding?:  test result   Would the patient rather a call back or a response via [facility]?  Call    Best Call Back Number:  [phone] ([place])    Additional Information:

## 2022-06-07 ENCOUNTER — TELEPHONE (OUTPATIENT)
Dept: CARDIOLOGY | Facility: CLINIC | Age: 77
End: 2022-06-07
Payer: MEDICARE

## 2022-06-07 ENCOUNTER — TELEPHONE (OUTPATIENT)
Dept: OTOLARYNGOLOGY | Facility: CLINIC | Age: 77
End: 2022-06-07
Payer: MEDICARE

## 2022-06-07 NOTE — TELEPHONE ENCOUNTER
----- Message from Sis Claudio sent at 6/7/2022  4:04 PM CDT -----  Contact: Self [phone]  Patient is returning a phone call.    Who left a message for the patient: nurse    Does patient know what this is regarding:  medication not pharmacy     Would you like a call back, or a response through your [facility] portal?:  call back    Comments:  Pt states pharmacy does not have levocetirizine (XYZAL) 5 MG tablet and waiting approval from provider.

## 2022-06-07 NOTE — TELEPHONE ENCOUNTER
First called the pharmacy and told them the pt called and was saying that the pharmacy didn't have it. The pharmacist said she picked some up last week and that she has 11 refills. I then left a voicemail for the pt stating what the pharmacy said.

## 2022-06-07 NOTE — TELEPHONE ENCOUNTER
----- Message from Sis Claudio sent at 6/7/2022  4:07 PM CDT -----  Contact: Self [phone]  Would like to receive medical advice.    Would they like a call back or a response via [facility]:  call back    Additional information:  Calling to speak with the nurse regarding if  blood pressure medication has been approved. Pt states unsure of name, but pharmacy is awaiting approval.

## 2022-06-14 ENCOUNTER — OFFICE VISIT (OUTPATIENT)
Dept: OTOLARYNGOLOGY | Facility: CLINIC | Age: 77
End: 2022-06-14
Payer: MEDICARE

## 2022-06-14 ENCOUNTER — OFFICE VISIT (OUTPATIENT)
Dept: UROLOGY | Facility: CLINIC | Age: 77
End: 2022-06-14
Payer: MEDICARE

## 2022-06-14 ENCOUNTER — OFFICE VISIT (OUTPATIENT)
Dept: NEUROLOGY | Facility: CLINIC | Age: 77
End: 2022-06-14
Payer: MEDICARE

## 2022-06-14 ENCOUNTER — TELEPHONE (OUTPATIENT)
Dept: CARDIOLOGY | Facility: CLINIC | Age: 77
End: 2022-06-14
Payer: MEDICARE

## 2022-06-14 VITALS
HEART RATE: 75 BPM | WEIGHT: 137.38 LBS | BODY MASS INDEX: 23.45 KG/M2 | RESPIRATION RATE: 18 BRPM | HEART RATE: 75 BPM | OXYGEN SATURATION: 97 % | TEMPERATURE: 97 F | WEIGHT: 137.38 LBS | DIASTOLIC BLOOD PRESSURE: 73 MMHG | SYSTOLIC BLOOD PRESSURE: 151 MMHG | HEIGHT: 64 IN | BODY MASS INDEX: 23.45 KG/M2 | HEIGHT: 64 IN | SYSTOLIC BLOOD PRESSURE: 151 MMHG | DIASTOLIC BLOOD PRESSURE: 73 MMHG

## 2022-06-14 VITALS — DIASTOLIC BLOOD PRESSURE: 76 MMHG | HEART RATE: 73 BPM | TEMPERATURE: 98 F | SYSTOLIC BLOOD PRESSURE: 152 MMHG

## 2022-06-14 DIAGNOSIS — N32.81 OAB (OVERACTIVE BLADDER): ICD-10-CM

## 2022-06-14 DIAGNOSIS — R49.0 DYSPHONIA: ICD-10-CM

## 2022-06-14 DIAGNOSIS — K21.9 LARYNGOPHARYNGEAL REFLUX (LPR): Primary | ICD-10-CM

## 2022-06-14 DIAGNOSIS — N39.3 STRESS INCONTINENCE OF URINE: Primary | ICD-10-CM

## 2022-06-14 DIAGNOSIS — Z86.79 HISTORY OF CEREBRAL ANEURYSM REPAIR: ICD-10-CM

## 2022-06-14 DIAGNOSIS — Z98.890 HISTORY OF CEREBRAL ANEURYSM REPAIR: ICD-10-CM

## 2022-06-14 DIAGNOSIS — R29.90 MULTIPLE NEUROLOGICAL SYMPTOMS: Primary | ICD-10-CM

## 2022-06-14 DIAGNOSIS — G40.109 TEMPORAL LOBE EPILEPSY: ICD-10-CM

## 2022-06-14 DIAGNOSIS — G31.84 MILD COGNITIVE IMPAIRMENT: ICD-10-CM

## 2022-06-14 DIAGNOSIS — Z86.16 HISTORY OF COVID-19: ICD-10-CM

## 2022-06-14 DIAGNOSIS — R05.3 CHRONIC COUGH: ICD-10-CM

## 2022-06-14 DIAGNOSIS — B02.29 POSTHERPETIC NEURALGIA: ICD-10-CM

## 2022-06-14 PROCEDURE — 99214 PR OFFICE/OUTPT VISIT, EST, LEVL IV, 30-39 MIN: ICD-10-PCS | Mod: S$GLB,,, | Performed by: NURSE PRACTITIONER

## 2022-06-14 PROCEDURE — 99215 OFFICE O/P EST HI 40 MIN: CPT | Mod: S$GLB,,, | Performed by: NURSE PRACTITIONER

## 2022-06-14 PROCEDURE — 3072F LOW RISK FOR RETINOPATHY: CPT | Mod: CPTII,S$GLB,, | Performed by: NURSE PRACTITIONER

## 2022-06-14 PROCEDURE — 3072F PR LOW RISK FOR RETINOPATHY: ICD-10-PCS | Mod: CPTII,S$GLB,, | Performed by: NURSE PRACTITIONER

## 2022-06-14 PROCEDURE — 1101F PR PT FALLS ASSESS DOC 0-1 FALLS W/OUT INJ PAST YR: ICD-10-PCS | Mod: CPTII,S$GLB,, | Performed by: NURSE PRACTITIONER

## 2022-06-14 PROCEDURE — 1100F PR PT FALLS ASSESS DOC 2+ FALLS/FALL W/INJURY/YR: ICD-10-PCS | Mod: CPTII,S$GLB,, | Performed by: OTOLARYNGOLOGY

## 2022-06-14 PROCEDURE — 3288F FALL RISK ASSESSMENT DOCD: CPT | Mod: CPTII,S$GLB,, | Performed by: OTOLARYNGOLOGY

## 2022-06-14 PROCEDURE — 99214 OFFICE O/P EST MOD 30 MIN: CPT | Mod: PBBFAC | Performed by: NURSE PRACTITIONER

## 2022-06-14 PROCEDURE — 1125F AMNT PAIN NOTED PAIN PRSNT: CPT | Mod: CPTII,S$GLB,, | Performed by: OTOLARYNGOLOGY

## 2022-06-14 PROCEDURE — 3078F PR MOST RECENT DIASTOLIC BLOOD PRESSURE < 80 MM HG: ICD-10-PCS | Mod: CPTII,S$GLB,, | Performed by: OTOLARYNGOLOGY

## 2022-06-14 PROCEDURE — 99214 OFFICE O/P EST MOD 30 MIN: CPT | Mod: 25,S$GLB,, | Performed by: OTOLARYNGOLOGY

## 2022-06-14 PROCEDURE — 1160F RVW MEDS BY RX/DR IN RCRD: CPT | Mod: CPTII,S$GLB,, | Performed by: NURSE PRACTITIONER

## 2022-06-14 PROCEDURE — 99999 PR PBB SHADOW E&M-EST. PATIENT-LVL V: ICD-10-PCS | Mod: PBBFAC,,, | Performed by: NURSE PRACTITIONER

## 2022-06-14 PROCEDURE — 3078F DIAST BP <80 MM HG: CPT | Mod: CPTII,S$GLB,, | Performed by: NURSE PRACTITIONER

## 2022-06-14 PROCEDURE — 1125F PR PAIN SEVERITY QUANTIFIED, PAIN PRESENT: ICD-10-PCS | Mod: CPTII,S$GLB,, | Performed by: NURSE PRACTITIONER

## 2022-06-14 PROCEDURE — 3072F PR LOW RISK FOR RETINOPATHY: ICD-10-PCS | Mod: CPTII,S$GLB,, | Performed by: OTOLARYNGOLOGY

## 2022-06-14 PROCEDURE — 1159F MED LIST DOCD IN RCRD: CPT | Mod: CPTII,S$GLB,, | Performed by: NURSE PRACTITIONER

## 2022-06-14 PROCEDURE — 1159F PR MEDICATION LIST DOCUMENTED IN MEDICAL RECORD: ICD-10-PCS | Mod: CPTII,S$GLB,, | Performed by: NURSE PRACTITIONER

## 2022-06-14 PROCEDURE — 1101F PT FALLS ASSESS-DOCD LE1/YR: CPT | Mod: CPTII,S$GLB,, | Performed by: NURSE PRACTITIONER

## 2022-06-14 PROCEDURE — 1125F AMNT PAIN NOTED PAIN PRSNT: CPT | Mod: CPTII,S$GLB,, | Performed by: NURSE PRACTITIONER

## 2022-06-14 PROCEDURE — 3288F PR FALLS RISK ASSESSMENT DOCUMENTED: ICD-10-PCS | Mod: CPTII,S$GLB,, | Performed by: NURSE PRACTITIONER

## 2022-06-14 PROCEDURE — 1100F PTFALLS ASSESS-DOCD GE2>/YR: CPT | Mod: CPTII,S$GLB,, | Performed by: OTOLARYNGOLOGY

## 2022-06-14 PROCEDURE — 1126F AMNT PAIN NOTED NONE PRSNT: CPT | Mod: CPTII,S$GLB,, | Performed by: NURSE PRACTITIONER

## 2022-06-14 PROCEDURE — 1125F PR PAIN SEVERITY QUANTIFIED, PAIN PRESENT: ICD-10-PCS | Mod: CPTII,S$GLB,, | Performed by: OTOLARYNGOLOGY

## 2022-06-14 PROCEDURE — 99214 PR OFFICE/OUTPT VISIT, EST, LEVL IV, 30-39 MIN: ICD-10-PCS | Mod: 25,S$GLB,, | Performed by: OTOLARYNGOLOGY

## 2022-06-14 PROCEDURE — 3077F PR MOST RECENT SYSTOLIC BLOOD PRESSURE >= 140 MM HG: ICD-10-PCS | Mod: CPTII,S$GLB,, | Performed by: NURSE PRACTITIONER

## 2022-06-14 PROCEDURE — 99999 PR PBB SHADOW E&M-EST. PATIENT-LVL V: CPT | Mod: PBBFAC,,, | Performed by: OTOLARYNGOLOGY

## 2022-06-14 PROCEDURE — 1126F PR PAIN SEVERITY QUANTIFIED, NO PAIN PRESENT: ICD-10-PCS | Mod: CPTII,S$GLB,, | Performed by: NURSE PRACTITIONER

## 2022-06-14 PROCEDURE — 99215 PR OFFICE/OUTPT VISIT, EST, LEVL V, 40-54 MIN: ICD-10-PCS | Mod: S$GLB,,, | Performed by: NURSE PRACTITIONER

## 2022-06-14 PROCEDURE — 1160F PR REVIEW ALL MEDS BY PRESCRIBER/CLIN PHARMACIST DOCUMENTED: ICD-10-PCS | Mod: CPTII,S$GLB,, | Performed by: NURSE PRACTITIONER

## 2022-06-14 PROCEDURE — 99999 PR PBB SHADOW E&M-EST. PATIENT-LVL V: ICD-10-PCS | Mod: PBBFAC,,, | Performed by: OTOLARYNGOLOGY

## 2022-06-14 PROCEDURE — 3078F PR MOST RECENT DIASTOLIC BLOOD PRESSURE < 80 MM HG: ICD-10-PCS | Mod: CPTII,S$GLB,, | Performed by: NURSE PRACTITIONER

## 2022-06-14 PROCEDURE — 99999 PR PBB SHADOW E&M-EST. PATIENT-LVL IV: CPT | Mod: PBBFAC,,, | Performed by: NURSE PRACTITIONER

## 2022-06-14 PROCEDURE — 3077F SYST BP >= 140 MM HG: CPT | Mod: CPTII,S$GLB,, | Performed by: NURSE PRACTITIONER

## 2022-06-14 PROCEDURE — 99999 PR PBB SHADOW E&M-EST. PATIENT-LVL IV: ICD-10-PCS | Mod: PBBFAC,,, | Performed by: NURSE PRACTITIONER

## 2022-06-14 PROCEDURE — 99214 OFFICE O/P EST MOD 30 MIN: CPT | Mod: S$GLB,,, | Performed by: NURSE PRACTITIONER

## 2022-06-14 PROCEDURE — 3288F FALL RISK ASSESSMENT DOCD: CPT | Mod: CPTII,S$GLB,, | Performed by: NURSE PRACTITIONER

## 2022-06-14 PROCEDURE — 31575 DIAGNOSTIC LARYNGOSCOPY: CPT | Mod: S$GLB,,, | Performed by: OTOLARYNGOLOGY

## 2022-06-14 PROCEDURE — 99999 PR PBB SHADOW E&M-EST. PATIENT-LVL V: CPT | Mod: PBBFAC,,, | Performed by: NURSE PRACTITIONER

## 2022-06-14 PROCEDURE — 3077F PR MOST RECENT SYSTOLIC BLOOD PRESSURE >= 140 MM HG: ICD-10-PCS | Mod: CPTII,S$GLB,, | Performed by: OTOLARYNGOLOGY

## 2022-06-14 PROCEDURE — 3288F PR FALLS RISK ASSESSMENT DOCUMENTED: ICD-10-PCS | Mod: CPTII,S$GLB,, | Performed by: OTOLARYNGOLOGY

## 2022-06-14 PROCEDURE — 3078F DIAST BP <80 MM HG: CPT | Mod: CPTII,S$GLB,, | Performed by: OTOLARYNGOLOGY

## 2022-06-14 PROCEDURE — 3077F SYST BP >= 140 MM HG: CPT | Mod: CPTII,S$GLB,, | Performed by: OTOLARYNGOLOGY

## 2022-06-14 PROCEDURE — 31575 PR LARYNGOSCOPY, FLEXIBLE; DIAGNOSTIC: ICD-10-PCS | Mod: S$GLB,,, | Performed by: OTOLARYNGOLOGY

## 2022-06-14 PROCEDURE — 3072F LOW RISK FOR RETINOPATHY: CPT | Mod: CPTII,S$GLB,, | Performed by: OTOLARYNGOLOGY

## 2022-06-14 RX ORDER — METOPROLOL TARTRATE 25 MG/1
25 TABLET, FILM COATED ORAL 2 TIMES DAILY
COMMUNITY
Start: 2022-05-31 | End: 2022-06-17

## 2022-06-14 RX ORDER — PANTOPRAZOLE SODIUM 40 MG/1
40 TABLET, DELAYED RELEASE ORAL 2 TIMES DAILY
Qty: 60 TABLET | Refills: 11 | Status: SHIPPED | OUTPATIENT
Start: 2022-06-14 | End: 2023-05-25

## 2022-06-14 NOTE — TELEPHONE ENCOUNTER
Spoke to pt and brother. It sounds like she needs a pa on her cardizem. ----- Message from Jarrell Dye MD sent at 6/14/2022  1:54 PM CDT -----  OK I spoke with the pharmacist that day I think regarding the increase in Diltiazem dose, if they are not able to fill it [facility], she should be taking Metoprolol 50mg twice a day. We already discussed the drug interaction does not need any PA. If she has any further issues please have her follow up with me in clinic. Thanks      ----- Message -----  From: Janna Gilbert LPN  Sent: 6/14/2022   1:50 PM CDT  To: Jarrell Dye MD    Please advise pb  ----- Message -----  From: Rayne Maguire  Sent: 6/13/2022   4:24 PM CDT  To: Hardik Vieyra [facility] is calling in regards to diltiaZEM (CARDIZEM LA) 180 mg 24 hr tablet. Caller states that pt said that doctor was going up on the doses but was previous taking metoprolol tartrate 25mg and 50 mg twice a day by 2 different doctor. Caller states that their Is a drug interaction with ranolazine (RANEXA) 1,000 mg Tb12. Caller states that the Cardizem  need a PA.     Caller can be reached at  [phone]

## 2022-06-14 NOTE — PROGRESS NOTES
Chief Complaint:   Follow-up Myrbetriq    HPI:   Patient is a 76-year-old female that is presenting for a six-month follow-up Myrbetriq.  Patient was to also proceed to PT pelvic floor training.  Patient reports that she did not follow up with PT pelvic floor training.  Reports that Myrbetriq has not decreased her urinary frequency, mixed incontinence and nocturia.  Patient does have a history of failed anticholinergic.  Urine in clinic was negative.  PVR was 2 mL.     Allergies:  Penicillins, Adhesive, Betadine [povidone-iodine], Doxycycline, Oxycodone, Sulfa (sulfonamide antibiotics), and Zanaflex [tizanidine]    Medications:  has a current medication list which includes the following prescription(s): acetaminophen, albuterol, albuterol, albuterol, amlodipine, aptiom, aptiom, atorvastatin, azithromycin, azithromycin, cholecalciferol (vitamin d3), clopidogrel, daliresp, denosumab, diclofenac, diltiazem, escitalopram oxalate, fluticasone propionate, trelegy ellipta, gabapentin, hydrochlorothiazide, ipratropium, ketoconazole, latanoprost, levocetirizine, magnesium oxide, metformin, metoprolol tartrate, myrbetriq, montelukast, nitroglycerin, pantoprazole, potassium chloride sa, prednisone, ranolazine, timolol maleate 0.5%, tizanidine, triamcinolone acetonide 0.025%, and valsartan, and the following Facility-Administered Medications: acetaminophen.    Review of Systems:  General: No fever, chills, fatigability, or weight loss.  Skin: No rashes, itching, or changes in color or texture of skin.  Chest: Denies TURCIOS, cyanosis, wheezing, cough, and sputum production.  Abdomen: Appetite fine. No weight loss. Denies diarrhea, abdominal pain, hematemesis, or blood in stool.  Musculoskeletal: No joint stiffness or swelling. Denies back pain.  : As above.  All other review of systems negative.    PMH:   has a past medical history of Abnormal ECG (8/5/2019), Aneurysm, Anticoagulant long-term use, Arthritis, CAD in native artery  (8/5/2019), COPD (chronic obstructive pulmonary disease), Diabetes mellitus, Fall (10/10/2019), Glaucoma, Hypertension, Seizures, Shingles (05/27/2017), and Stroke.    PSH:   has a past surgical history that includes Brain surgery; Hysterectomy; Transforaminal epidural injection of steroid (Bilateral, 7/23/2019); Cardiac catheterization; Coronary angioplasty; Epidural steroid injection into lumbar spine (Bilateral, 11/12/2019); Injection of anesthetic agent around medial branch nerves innervating lumbar facet joint (Bilateral, 1/31/2020); Transforaminal epidural injection of steroid (Bilateral, 3/10/2020); Transforaminal epidural injection of steroid (Right, 6/19/2020); Injection of joint (Bilateral, 7/9/2020); and Injection of anesthetic agent into sacroiliac joint (Right, 11/16/2021).    FamHx: family history includes No Known Problems in her father and mother.    SocHx:  reports that she quit smoking about 18 years ago. Her smoking use included cigarettes. She has a 10.00 pack-year smoking history. She has never used smokeless tobacco. She reports that she does not drink alcohol and does not use drugs.      Physical Exam:  Vitals:    06/14/22 1106   BP: (!) 151/73   Pulse: 75   Temp: 97 °F (36.1 °C)     General: A&Ox3, no apparent distress, no deformities  Neck: No masses, normal thyroid  Lungs: normal inspiration, no use of accessory muscles  Heart: normal pulse, no arrhythmias  Abdomen: Soft, NT, ND, no masses, no hernias, no hepatosplenomegaly    Labs/Studies:   See HPI    Impression/Plan:   Overactive bladder  Patient has failed to oral medications, therefore, proceeding with additional treatment options is appropriate.  Patient was scheduled with MD to discuss possible Botox and InterStim.

## 2022-06-14 NOTE — PROGRESS NOTES
"  Subjective:       Patient ID: Shireen Felix is a 76 y.o. female.    Chief Complaint: multiple neurological symptoms           Seizures   Associated symptoms include confusion and visual disturbances. Pertinent negatives include no headaches, no speech difficulty, no chest pain, no nausea and no vomiting. Characteristics do not include apnea.        The patient is here to establish care for numerous, complex chronic neurological disorders.    Had a stroke in 2000/2001 that caused LT HP. On Plavix and Lipitor. VRFs are stratified (HTN, HLD, T2DM, Quit Smoking).     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    Was started on PHT for "seizure prophylaxis" after the aneurysm and was stopped after 2 years. Around 4699-2312 she started having drop attacks with LOC for few minutes with no seizure activity. Patient describes her "seizures" as episodes of falling down and eyes go back lasting for a few seconds. Denies shaking, incontinence, or tongue biting. Was started on OXC and then switched to  mg QD by neurology at [facility].  She is undergoing cardiac evaluation. Stopped driving in 2017. 09- through 10- 92 hours AEEG showed B/L TLE, F8-T8, F7-T7. States last episode occurred at the end of September 2021 while she was sitting in a chair. Prior episode occurred around July 2021. She takes  mg daily. Denies adverse effects. She is no longer taking GBP. She believes lack of sleep is a trigger.      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 Failed PGB. She is on  mg BID and Lidocaine patches. GCA was suspected with no TORO. States ESL and lidocaine patches help decrease the pain mildly. No longer taking GBP.    Complains of problems with memory that started in 2019, but has worsened over the last year.  Both short term and long term memory are affected. She forgets why she is going into rooms as " "well as birthdays. Denies forgetting holidays or names. Does not drive. Denies getting lost. Able to complete ADLs such as cooking, bathing, dressing, and feeding self. Has forgotten to turn stove off before. Her family helps with her finances, makes doctors appointments, and keeps up with her medications. She does misplacing items within her home but does not place items in odd places. She denies falls. Denies drinking alcohol. Has trouble falling and staying asleep at night. No changes in appetite. Denies depression but states she has anxiety. Denies hallucinations. Denies history of hypothyroidism. Positive for history of stroke and aneurysms. She lives alone. Mother was diagnosed with demenita in her 70s and her maternal aunts had dementia.     Interval Note 12-: Patient is here for follow-up for multiple neurological complaints. Continues to take Plavix and Lipitor for stroke prevention without adverse effects. 10- CTA H/N Stable. No evidence of acute intracranial hemorrhage. Bilateral craniotomies with LT MCA aneurysm clip.No evidence of recurrent or residual aneurysm sac.  No other aneurysms    Currently taking ESL 1200 mg QD without adverse effects for seizures. Has not had seizure since dose increase. Last seizure September 2021.    State PHN pain has improved significantly with ESL 1200 mg QD and lidocaine patches.     No changes in memory since last visit. Vit B12, MMA, folate, HC, T4, and RPR unremarkable. Needs appointment with neuropsych.    Patient complains of dizziness with nausea that started 3 weeks ago (November 2021). She describes dizziness as "thrown off balance" and "head swimming". Denies spinning sensation. Also experiences light headedness. She states the dizziness occurs when standing and when turning her body around, although it does occur with laying down at times. It occurs with bending over.  It happens a few times a day lasting seconds. Denies ringing in ear but states " "she experiences decreased hearing in right ear. Denies double vision, ataxia, slurred speech, dysphagia, focal weakness, numbness, vomiting or headaches. Denies chest pain, palpitations, and sweating. States she feels like she's having a "hot flash" sometimes with dizziness.    Interval History 6-: Patient presents for follow up appointment unaccompanied. Continues to take Plavix and Lipitor for stroke prevention without adverse effects.     Continues to take ESL 1200 mg QD without adverse effects for seizures. Last seizure September 2021. States she is having a new spell described as feeling tired then mind going blank for a few seconds. Unsure if her eyes are open or closed during events. Denies LOC with events. States she feels as if she is going to fall. She states she has had 2 of these spells in the last 3 months with last spell 6-.    Continues to have PHN pain. ESL 1200 mg QD has helped the pain.    Continues to have trouble with her memory. States her attention span is worsening, espiecally when she is tired. She has had COVID 19 twice in the last 6 months. States she had a fall and fractured her left foot. Has not completed NCT. 6-2-2022 CT sinuses shows postoperative changes from prior bilateral internal craniotomies.  Aneurysm clip within the left MCA distribution.     States her dizziness has improved.       Review of Systems   Constitutional: Positive for fatigue. Negative for appetite change.   HENT: Positive for hearing loss (left ear). Negative for tinnitus.    Eyes: Positive for visual disturbance. Negative for photophobia.   Respiratory: Positive for shortness of breath. Negative for apnea.    Cardiovascular: Negative for chest pain and palpitations.   Gastrointestinal: Negative for nausea and vomiting.   Endocrine: Negative for cold intolerance and heat intolerance.   Genitourinary: Positive for urgency. Negative for difficulty urinating.   Musculoskeletal: Positive for arthralgias, " back pain, gait problem and neck pain. Negative for joint swelling, myalgias and neck stiffness.   Skin: Negative for color change and rash.   Allergic/Immunologic: Negative for environmental allergies and immunocompromised state.   Neurological: Positive for dizziness, seizures, syncope, weakness, light-headedness and numbness. Negative for tremors, facial asymmetry, speech difficulty and headaches.   Hematological: Negative for adenopathy. Does not bruise/bleed easily.   Psychiatric/Behavioral: Positive for confusion, dysphoric mood and sleep disturbance. Negative for agitation, behavioral problems, decreased concentration, hallucinations, self-injury and suicidal ideas. The patient is nervous/anxious. The patient is not hyperactive.                  Current Outpatient Medications:     acetaminophen (TYLENOL) 500 MG tablet, Take 500 mg by mouth as needed for Pain., Disp: , Rfl:     albuterol (PROVENTIL) 2.5 mg /3 mL (0.083 %) nebulizer solution, Take 3 mLs (2.5 mg total) by nebulization every 4 (four) hours as needed. Rescue, Disp: 150 mL, Rfl: 11    albuterol (PROVENTIL) 2.5 mg /3 mL (0.083 %) nebulizer solution, Take 2.5 mg by nebulization every 4 (four) hours as needed for Wheezing. Rescue, Disp: , Rfl:     albuterol (PROVENTIL/VENTOLIN HFA) 90 mcg/actuation inhaler, INHALE 2 PUFFS INTO THE LUNGS EVERY 4 HOURS AS NEEDED, Disp: 18 g, Rfl: 11    amLODIPine (NORVASC) 10 MG tablet, Take 1 tablet (10 mg total) by mouth once daily., Disp: 90 tablet, Rfl: 3    APTIOM 400 mg Tab tablet, TAKE 1 TABLET BY MOUTH EVERY EVENING WITH 800MG TABLET, Disp: 30 tablet, Rfl: 2    APTIOM 800 mg Tab, TAKE 1 TABLET BY MOUTH EVERY EVENING WITH 400MG TABLET, Disp: 30 tablet, Rfl: 2    atorvastatin (LIPITOR) 40 MG tablet, TAKE 1 TABLET BY MOUTH ONCE DAILY, Disp: 90 tablet, Rfl: 1    azithromycin (Z-EMEKA) 250 MG tablet, Take 2 tablets by mouth on day 1; Take 1 tablet by mouth on days 2-5, Disp: 6 tablet, Rfl: 0    azithromycin  (Z-EMEKA) 250 MG tablet, Take as directed, Disp: 6 tablet, Rfl: 0    cholecalciferol, vitamin D3, 1,250 mcg (50,000 unit) capsule, Take 1 capsule (50,000 Units total) by mouth every 7 days., Disp: 12 capsule, Rfl: 0    clopidogreL (PLAVIX) 75 mg tablet, TAKE 1 TABLET BY MOUTH ONCE DAILY, Disp: 90 tablet, Rfl: 3    DALIRESP 500 mcg Tab, TAKE 1 TABLET BY MOUTH ONCE DAILY, Disp: 90 tablet, Rfl: 3    denosumab (PROLIA) 60 mg/mL Syrg, every 6 (six) months., Disp: , Rfl:     diclofenac (VOLTAREN) 25 MG TbEC, TAKE 1 TABLET BY MOUTH TWICE DAILY, Disp: 60 tablet, Rfl: 11    diltiaZEM (CARDIZEM LA) 180 mg 24 hr tablet, Take 1 tablet (180 mg total) by mouth once daily., Disp: 90 tablet, Rfl: 1    EScitalopram oxalate (LEXAPRO) 20 MG tablet, TAKE 1 TABLET BY MOUTH once a day, Disp: 90 tablet, Rfl: 1    fluticasone propionate (FLONASE) 50 mcg/actuation nasal spray, 2 sprays (100 mcg total) by Each Nostril route once daily at 6am., Disp: 16 g, Rfl: 11    fluticasone-umeclidin-vilanter (TRELEGY ELLIPTA) 200-62.5-25 mcg inhaler, Inhale 1 puff into the lungs once daily., Disp: 60 each, Rfl: 11    gabapentin (NEURONTIN) 100 MG capsule, Take 100 mg by mouth 2 (two) times daily as needed., Disp: , Rfl:     hydroCHLOROthiazide (HYDRODIURIL) 25 MG tablet, TAKE 1 TABLET BY MOUTH ONCE DAILY AS NEEDED, Disp: 90 tablet, Rfl: 3    ipratropium (ATROVENT) 42 mcg (0.06 %) nasal spray, 2 sprays by Nasal route 4 (four) times daily., Disp: 15 mL, Rfl: 11    ketoconazole (NIZORAL) 2 % cream, Apply topically 2 (two) times daily. For dry flaky patches of ear., Disp: 30 g, Rfl: 1    latanoprost 0.005 % ophthalmic solution, Place 1 drop into both eyes every evening., Disp: 2.5 mL, Rfl: 12    levocetirizine (XYZAL) 5 MG tablet, Take 1 tablet (5 mg total) by mouth every evening., Disp: 30 tablet, Rfl: 11    magnesium oxide (MAG-OX) 400 mg (241.3 mg magnesium) tablet, Take 1 tablet (400 mg total) by mouth once daily., Disp: 90 tablet, Rfl:  1    metFORMIN (GLUCOPHAGE) 500 MG tablet, TAKE 1 TABLET BY MOUTH ONCE DAILY, Disp: 90 tablet, Rfl: 3    metoprolol tartrate (LOPRESSOR) 50 MG tablet, Take 1 tablet (50 mg total) by mouth 2 (two) times daily., Disp: 60 tablet, Rfl: 3    mirabegron (MYRBETRIQ) 50 mg Tb24, Take 1 tablet (50 mg total) by mouth once daily., Disp: 30 tablet, Rfl: 11    montelukast (SINGULAIR) 10 mg tablet, TAKE 1 TABLET BY MOUTH ONCE DAILY, Disp: 90 tablet, Rfl: 3    nitroGLYCERIN (NITROSTAT) 0.4 MG SL tablet, DISSOLVE 1 TABLET UNDER TONGUE EVERY 5 MINUTES FOR CHEST PAIN (MAY TAKE UP TO 3 DOSES THEN SEEK MEDICAL ATTENTION), Disp: 25 tablet, Rfl: 0    pantoprazole (PROTONIX) 40 MG tablet, Take 1 tablet (40 mg total) by mouth 2 (two) times daily., Disp: 60 tablet, Rfl: 11    potassium chloride SA (K-DUR,KLOR-CON) 20 MEQ tablet, Take 1 tablet (20 mEq total) by mouth once daily., Disp: 90 tablet, Rfl: 1    predniSONE (DELTASONE) 5 MG tablet, 4 tabs x 5 days, then 3 tabs x 5 days, then 2 tabs x 5 days, then 5 mg daily x 15 days, Disp: 90 tablet, Rfl: 0    ranolazine (RANEXA) 1,000 mg Tb12, TAKE 1 TABLET BY MOUTH TWICE DAILY, Disp: 60 tablet, Rfl: 6    timolol maleate 0.5% (TIMOPTIC) 0.5 % Drop, Place 1 drop into both eyes every morning., Disp: 5 mL, Rfl: 12    tiZANidine (ZANAFLEX) 4 MG tablet, TAKE 1 TABLET BY MOUTH TWICE DAILY AS NEEDED, Disp: 60 tablet, Rfl: 0    triamcinolone acetonide 0.025% (KENALOG) 0.025 % cream, Apply topically 2 (two) times daily. PRN rash and itching of ear. Mild steroid cream., Disp: 30 g, Rfl: 0    valsartan (DIOVAN) 320 MG tablet, Take 1 tablet (320 mg total) by mouth once daily., Disp: 30 tablet, Rfl: 3    Current Facility-Administered Medications:     acetaminophen tablet 650 mg, 650 mg, Oral, Once PRN, Darwin Perkins MD  Past Medical History:   Diagnosis Date    Abnormal ECG 8/5/2019    Aneurysm     Anticoagulant long-term use     Arthritis     CAD in native artery 8/5/2019    COPD  (chronic obstructive pulmonary disease)     Diabetes mellitus     Fall 10/10/2019    Formatting of this note might be different from the original. Found sitting on floor next to bed last night Mild confusion today Does not recall falling or how she ended up on floor UA today Labs yesterday unremarkable    Glaucoma     Hypertension     Seizures     Shingles 05/27/2017    Stroke      Past Surgical History:   Procedure Laterality Date    BRAIN SURGERY      CARDIAC CATHETERIZATION      CORONARY ANGIOPLASTY      EPIDURAL STEROID INJECTION INTO LUMBAR SPINE Bilateral 11/12/2019    Procedure: TF XANDER L4/5;  Surgeon: Desean Dias MD;  Location: [facility];  Service: Pain Management;  Laterality: Bilateral;    HYSTERECTOMY      INJECTION OF ANESTHETIC AGENT AROUND MEDIAL BRANCH NERVES INNERVATING LUMBAR FACET JOINT Bilateral 1/31/2020    Procedure: Bilateral L3-5 MBB;  Surgeon: Desean Dias MD;  Location: [facility];  Service: Pain Management;  Laterality: Bilateral;    INJECTION OF ANESTHETIC AGENT INTO SACROILIAC JOINT Right 11/16/2021    Procedure: Right BLOCK, SACROILIAC JOINT Right GTB with RN IV sedation;  Surgeon: Yao Fulton MD;  Location: [facility];  Service: Pain Management;  Laterality: Right;    INJECTION OF JOINT Bilateral 7/9/2020    Procedure: Bilateral shoulder GH Joint injection with local;  Surgeon: Desean Dias MD;  Location: [facility];  Service: Pain Management;  Laterality: Bilateral;    TRANSFORAMINAL EPIDURAL INJECTION OF STEROID Bilateral 7/23/2019    Procedure: Bilateral L3/4 Transforaminal Epidural Steroid Injection;  Surgeon: Desean Dias MD;  Location: [facility];  Service: Pain Management;  Laterality: Bilateral;    TRANSFORAMINAL EPIDURAL INJECTION OF STEROID Bilateral 3/10/2020    Procedure: Right T12/L1 TF XANDER with local;  Surgeon: Desean Dias MD;  Location: [facility];  Service: Pain Management;  Laterality: Bilateral;     TRANSFORAMINAL EPIDURAL INJECTION OF STEROID Right 2020    Procedure: Right T12/L1 TFESI Covid day of procedure;  Surgeon: Desean Dias MD;  Location: [facility];  Service: Pain Management;  Laterality: Right;     Social History     Socioeconomic History    Marital status: Single   Tobacco Use    Smoking status: Former Smoker     Packs/day: 1.00     Years: 10.00     Pack years: 10.00     Types: Cigarettes     Quit date: 2004     Years since quittin.1    Smokeless tobacco: Never Used   Substance and Sexual Activity    Alcohol use: No    Drug use: Never    Sexual activity: Not Currently   Social History Narrative    No pets or smokers in household.             Past/Current Medical/Surgical History, Past/Current Social History, Past/Current Family History and Past/Current Medications were reviewed in detail.        Objective:     VITAL SIGNS WERE REVIEWED      GENERAL APPEARANCE:     The patient looks comfortable.    BMI 23.58    No signs of respiratory distress.    Normal breathing pattern.    No dysmorphic features    Normal eye contact.     GENERAL MEDICAL EXAM:    HEENT:  Head is atraumatic normocephalic.     Neck and Axillae: No JVD. No visible lesions.    Cardiopulmonary: No cyanosis. No tachypnea. Normal respiratory effort.    Gastrointestinal/Urogenital:  No jaundice. No stomas or lesions. No visible hernias. No catheters.     Skin, Hair and Nails: No pathognonomic skin rash. No neurofibromatosis. No visible lesions.No stigmata of autoimmune disease. No clubbing.    Limbs: No varicose veins. No visible swelling.    Muskoskeletal: No visible deformities.No visible lesions.           Neurologic Exam     Mental Status   Oriented to person, place, and time.   Follows 3 step commands.   Attention: normal. Concentration: normal.   Speech: speech is normal   Level of consciousness: alert  Knowledge: good and consistent with education.   Able to name object. Able to repeat. Normal  comprehension.     10- MOCA 26/30 (1 pointed added; highest level of education 10th grade)    Visuospatial/Executive 2/5    Naming                            3/3    Attention                         5/6    Language                         3/3    Abstraction                    2/2    Recall                                4/5 (recalled 5th with clue)    Orientation                     6/6        NORMAL-MILD NCD 26-30       Cranial Nerves     CN II   Visual acuity: decreased  Right visual field deficit: none  Left visual field deficit: upper temporal and upper nasal quadrant(s)    CN III, IV, VI   Pupils are equal, round, and reactive to light.  Extraocular motions are normal.   Right pupil: Size: 2 mm. Shape: regular. Reactivity: brisk. Consensual response: intact. Accommodation: intact.   Left pupil: Size: 2 mm. Shape: regular. Reactivity: brisk. Consensual response: intact. Accommodation: intact.   CN III: no CN III palsy  CN VI: no CN VI palsy  Nystagmus: none   Diplopia: none  Ophthalmoparesis: none  Upgaze: normal  Downgaze: normal  Conjugate gaze: present  Vestibulo-ocular reflex: present    CN V   Facial sensation intact.   Right facial sensation deficit: none  Left facial sensation deficit: none (pain with stroking forehead)    CN VII   Facial expression full, symmetric.   Right facial weakness: none  Left facial weakness: none    CN VIII   Hearing: impaired    CN IX, X   CN IX normal.   CN X normal.   Palate: symmetric    CN XI   CN XI normal.   Right sternocleidomastoid strength: normal  Left sternocleidomastoid strength: normal  Right trapezius strength: normal  Left trapezius strength: normal    CN XII   CN XII normal.   Tongue: not atrophic  Fasciculations: absent  Tongue deviation: none    Motor Exam   Muscle bulk: decreased  Right arm tone: normal  Left arm tone: increased  Right leg tone: normal  Left leg tone: normal    Strength   Right neck flexion: 5/5  Left neck flexion: 4/5  Right neck  extension: /  Left neck extension: 5  Right deltoid: 5/5  Left deltoid: /5  Right biceps:   Left biceps: 5  Right triceps:   Left triceps:   Right wrist flexion:   Left wrist flexion:   Right wrist extension:   Left wrist extension:   Right interossei:   Left interossei:   Right iliopsoas:   Left iliopsoas:   Right quadriceps:   Left quadriceps:   Right hamstrin/5  Left hamstrin/5  Right glutei:   Left glutei:   Right anterior tibial:   Left anterior tibial:   Right posterior tibial:   Left posterior tibial:   Right peroneal:   Left peroneal:   Right gastroc:   Left gastroc:     Sensory Exam   Right arm light touch: decreased from wrist  Left arm light touch: decreased from wrist  Right leg light touch: decreased from ankle  Left leg light touch: decreased from ankle  Right arm vibration: normal  Left arm vibration: normal  Right leg vibration: decreased from toes  Left leg vibration: decreased from toes  Right arm proprioception: normal  Left arm proprioception: normal  Right leg proprioception: decreased from toes  Left leg proprioception: decreased from toes  Right arm pinprick: decreased from wrist  Left arm pinprick: decreased from wrist  Right leg pinprick: decreased from ankle  Left leg pinprick: decreased from ankle  Graphesthesia: normal  Stereognosis: normal    Gait, Coordination, and Reflexes     Gait  Gait: non-neurologic (Antalgic)    Coordination   Finger to nose coordination: normal  Heel to shin coordination: normal    Tremor   Resting tremor: absent  Intention tremor: absent  Action tremor: absent    Reflexes   Right brachioradialis: 1+  Left brachioradialis: 1+  Right biceps: 1+  Left biceps: 1+  Right triceps: 1+  Left triceps: 1+  Right patellar: 1+  Left patellar: 1+  Right achilles: 0  Left achilles: 0  Right plantar: normal  Left plantar: normal  Right Thompson: absent  Left Thompson: absent  Right ankle clonus:  absent  Left ankle clonus: absent  Right pendular knee jerk: absent  Left pendular knee jerk: absent Antalgic gait       Lab Results   Component Value Date    WBC 10.45 01/06/2022    HGB 12.1 01/06/2022    HCT 37.0 01/06/2022    MCV 95 01/06/2022     01/06/2022     Sodium   Date Value Ref Range Status   06/02/2022 134 (L) 136 - 145 mmol/L Final     Potassium   Date Value Ref Range Status   06/02/2022 3.3 (L) 3.5 - 5.1 mmol/L Final     Chloride   Date Value Ref Range Status   06/02/2022 102 95 - 110 mmol/L Final     CO2   Date Value Ref Range Status   06/02/2022 20 (L) 23 - 29 mmol/L Final     Glucose   Date Value Ref Range Status   06/02/2022 114 (H) 70 - 110 mg/dL Final     BUN   Date Value Ref Range Status   06/02/2022 16 8 - 23 mg/dL Final     Creatinine   Date Value Ref Range Status   06/02/2022 0.8 0.5 - 1.4 mg/dL Final     Calcium   Date Value Ref Range Status   06/02/2022 8.9 8.7 - 10.5 mg/dL Final     Total Protein   Date Value Ref Range Status   05/06/2022 6.9 6.0 - 8.4 g/dL Final     Albumin   Date Value Ref Range Status   05/06/2022 3.1 (L) 3.5 - 5.2 g/dL Final     Total Bilirubin   Date Value Ref Range Status   05/06/2022 0.3 0.1 - 1.0 mg/dL Final     Comment:     For infants and newborns, interpretation of results should be based  on gestational age, weight and in agreement with clinical  observations.    Premature Infant recommended reference ranges:  Up to 24 hours.............<8.0 mg/dL  Up to 48 hours............<12.0 mg/dL  3-5 days..................<15.0 mg/dL  6-29 days.................<15.0 mg/dL       Alkaline Phosphatase   Date Value Ref Range Status   05/06/2022 52 (L) 55 - 135 U/L Final     AST   Date Value Ref Range Status   05/06/2022 14 10 - 40 U/L Final     ALT   Date Value Ref Range Status   05/06/2022 22 10 - 44 U/L Final     Anion Gap   Date Value Ref Range Status   06/02/2022 12 8 - 16 mmol/L Final     eGFR if    Date Value Ref Range Status   06/02/2022 >60 >60  mL/min/1.73 m^2 Final     eGFR if non    Date Value Ref Range Status   06/02/2022 >60 >60 mL/min/1.73 m^2 Final     Comment:     Calculation used to obtain the estimated glomerular filtration  rate (eGFR) is the CKD-EPI equation.        Lab Results   Component Value Date    [id number] 583 10/27/2021     Lab Results   Component Value Date    TSH 1.249 08/09/2021    H2NMNKN 5.9 10/27/2021     AED: Eslicarbazepine NORMAL LEVEL RANGE:  Not estblished   DATE 10- LEVEL 24.9                                   03-    CTH Bilateral craniotomy changes in the frontal and temporal regions.  Aneurysm clip present in the floor of the left middle cranial fossa.  Old appearing lacunar infarct in the anterior limb of the right internal capsule.         [id number]    CT Spine Multilevel DDD      08-    ESR is NL for her age and improving 80>67>58     Unlikely GCA/TA.     08- (Interpreted 08-)    EEG NL       09- through 10-     Interpreted 10-     92 hours AEEG    B/L TLE, F8-T8, F7-T7    10-     CTA H/N Stable     Vit B12, MMA, folate, HC, T4, and RPR unremarkable     No evidence of acute intracranial hemorrhage.Bilateral craniotomies with LT MCA aneurysm clip.No evidence of recurrent or residual aneurysm sac.  No other aneurysms    6-2-2022    CT sinuses shows postoperative changes from prior bilateral internal craniotomies.  Aneurysm clip within the left MCA distribution.     Reviewed the neuroimaging independently       Assessment:       No diagnosis found.        EPILEPSY CLASSIFICATION        SEMIOLOGY: unknown     EPILEPTOGENIC ZONE (S): Bilateral Temporal Lobes     ETIOLOGY: Stroke, aneurysms      PRIOR AEDS:  Dilantin, gabapentin    CURRENT AEDS: Aptiom     LAST SEIZURE DATE: September 2021        COMPREHENSIVE LIST OF AEDs:      Acetazolamide (AZM-Diamox)   Benzos: clonazepam (CZP Klonopin), lorazepam (LZP-Ativan), diazepam (DZP-Valium),  clorazepate (CLZ- Tranxene)  Brivaracetam (BRV-Briviact)  Cannabidiol (CBD- Epidiolex)  Carbamazepine (CBZ-Tegretol)  Cenobamate (CNB-Xcopri)  Clobazam (CLB-Onfi)  Eslicarbazepine (ESL-Aptiom)  Ethosuximide (ESX-Zarontin)  Felbamate (FBM-Felbatol)  Gabapentin (GBP-Neurontin)  Lacosamide (LCM-Vimpat)  Lamotrigine (LTG-Lamitcal)  Levetiracetam (LEV- Keppra)  Oxcarbazepine (OXC-Trileptal)  Perampanel (PML-Fycompa)  Phenobarbital (PB)  Phenytoin (PHT-Dilantin)  Pregabalin (PGB-Lyrica)  Primidone (PRM)   Retigabine (RTG- Potiga) Discontinued in 2017  Rufinamide (RFN-Benzil)  Stiripentol (STP-Diacomit)  Tiagabine (TGB-Gabitril)  Topiramate (TPM-Topamax)  Valproate (VPA-Depakote)  Vigabatrin (VGB-Sabril)  Zonisamide (ZNS-Zonegran)      Modified Rock Score (m-RS)      0  The patient has no residual symptoms.    1  The patient has no significant disability; able to carry out all pre-stroke activities.    2  The patient has slight disability; unable to carry out all pre-stroke activities but able to look after self without daily help.    3  The patient has moderate disability; requiring some external help but able to walk without the assistance of another individual.    4  The patient has moderately severe disability; unable to walk or attend to bodily functions without assistance of another individual.    5  The patient has severe disability; bedridden, incontinent, requires continuous care.    6  The patient has  (during the hospital stay or after discharge from the hospital).    Plan:         TEMPORAL LOBE EPILEPSY     Continue ESL 1200 mg QD and check level at next appointment.    Discuss new spells with cardiology. Consider evaluating EEG if spells continue.    The patient was encouraged to maintain full traditional seizure precautions which include but not limited to avoid driving, avoid high altitudes, avoid being close to fire or fire source, avoid being close to a body of water or swimming alone, avoid  operating heavy machinery and avoid using sharp objects if possible. The patient was encouraged to shower (without accumulation of water) instead of taking a bath if unsupervised. The patient was made aware that these precautions are especially important during concurrent illness. Adequate sleep and avoidance of alcohol as important measures to assure good seizure control were discussed with the patient. The patient was also advised not to care for children without company. The patient was advised to pad the side rails with pillows and blankets if applicable.I strongly recommended lowering the bed to the floor level to decrease the risk of falls.       Made the patient aware that the duration of restrictions/precautions varies and in [place] it is 6 months. The patient verbalized  full understanding.                              Continue AEDs INDEFINITELY, FOR GOOD AND NEVER SKIP A DOSE. The patient verbalized full understanding. Stressed the extreme medical, personal safety, public safety and legal importance of compliance and seizure control. Will give as many refills as possible with or without face-to-face evaluation to make sure the patient and any patient with epilepsy will never run out of medications. Running out of seizure medications should never happen under our care. I explained to the patient that he should not, under any circumstances, adjust or stop taking his seizure medication without discussing with me. The patient verbalized full understanding.       Explained to the patient that if 2 AEDs fail to control the seizures the likelihood of AEDs alone to control the seizures is <10% and other other options should be pursued.      AVOID any substance that could lower seizure threshold including but not limited to:      ALCOHOL AND WITHDRAWAL      TRAMADOL.     DEMEROL      ALL STIMULANTS-ALL ADHD MEDICATIONS.      CLOZAPINE.      BUPROPION.     CIPROFLOXACIN        SEIZURE FREEDOM DEFINITION: No seizures for  1 year or for 3 times the interval between the last 2 seizures whichever is longer (Some studies suggest 6 times even)!      HISTORY OF LACUNAR RT PLIC STROKES    Continue Plavix.     Vascular Risk Factors (VRFs) stratification (BP, BS, BC control and Smoking Cessation) is the mainstay of stroke prevention (>90%)..       Healthy diet and exercise    Avoid driving.    Call 911 if any SUDDEN:   Weakness  Numbness  Slurring of speech  Speech difficulty   Vertigo  Loss of balance  Loss of vision  Loss of hearing  Double vision  Trouble swallowing  Trouble breathing  Facial drooping        HISTORY OF MULTIPLE ANEURYSMS S/P CRANIOTOMY     Call 911 for the worst head (thunderclap headache).      LT PHN    Continue ESL 1200 mg QD.        MILD COGNITIVE IMPAIRMENT    Complete Comprehensive Neuropsychological Testing.    Falling Down Precautions.    Avoid driving and access to firearms     Help with finances and decision making.    Join support group.    Proofing the house and use labeling.    Avoid antihistamines and anticholinergics.    Avoid changing routine.    Use written reminders.    Avoid multitasking.    Healthy diet, exercise (physical and cognitive).    Good sleep hygiene.      PREVENTION OF DELIRIUM       1. Good hydration and avoid electrolyte imbalance  2. Recognize andtreat infections immediately especially UTI.  3. Bladder emptying and prevent constipation.   4. Provide stimulating activities and familiar objects  5. Use eyeglasses and hearing aids if needed.   6. Use simple and regular communication about people, current place and time  7. Mobility and range-of-motion exercises  8. Reduce noise, lighting and avoid sleep interruptions  9. Non-narcotic pain management.  10.Nondrug treatment for sleep problems or anxiety  11. Avoid antihistamines.  12. Avoid narcotics.  13. Avoid benzodiazepines.       DIZZINESS    Improved           MEDICAL/SURGICAL COMORBIDITIES     All relevant medical comorbidities noted and  managed by primary care physician and medical care team.          MISCELLANEOUS MEDICAL PROBLEMS       HEALTHY LIFESTYLE AND PREVENTATIVE CARE    The patient to adhere to the age-appropriate health maintenance guidelines including screening tests and vaccinations. The patient to adhere to  healthy lifestyle, optimal weight, exercise, healthy diet, good sleep hygiene and avoiding drugs including smoking, alcohol and recreational drugs.      I spent a total of 50 minutes on the day of the visit.  This includes face to face time and non-face to face time preparing to see the patient (eg, review of tests), obtaining and/or reviewing separately obtained history, documenting clinical information in the electronic or other health record, independently interpreting results and communicating results to the patient/family/caregiver, or care coordinator.      RTC in 6 months         Priya Benavidez, MSN, NP    Collaborating Provider: Emerita Simpson MD, FAAN Neurologist/Epileptologist

## 2022-06-14 NOTE — PROGRESS NOTES
Referring Provider:    No referring provider defined for this encounter.  Subjective:   Patient: Shireen Felix [id number], :1945   Visit date:2022 10:32 AM    Chief Complaint:  Cough (Steady productive cough since beginning of year. Had COVID twice. Also has some sinus issues)    HPI:    Prior notes reviewed by myself.  Clinical documentation obtained by nursing staff reviewed.     77 y/o female with history of COPD, chronic sinusitis, nasal septal perforation presents for evaluation of PND, cough, rhinorrhea, congestion, sore throat and dysphonia.  She is a former smoker.  These symptoms have been present intermittently in some form since January when she had COVID 19 infection.  Symptoms are worse when supine and after she eats.  Dysphonia comes and goes but is worse when she talks a lot.  She did have a recent CT sinus which is below.        Objective:     Physical Exam:  Vitals:  BP (!) 152/76   Pulse 73   Temp 98.1 °F (36.7 °C) (Temporal)   LMP  (LMP Unknown)   General appearance:  Well developed, well nourished    Ears:  Otoscopy of external auditory canals and tympanic membranes was normal, clinical speech reception thresholds grossly intact, no mass/lesion of auricle.    Nose:  No masses/lesions of external nose, nasal mucosa, septum, and turbinates were within normal limits.    Mouth:  No mass/lesion of lips, teeth, gums, hard/soft palate, tongue, tonsils, or oropharynx.    Neck & Lymphatics:  No cervical lymphadenopathy, no neck mass/crepitus/ asymmetry, trachea is midline, no thyroid enlargement/tenderness/mass.        [x]  Data Reviewed:    Lab Results   Component Value Date    WBC 10.45 2022    HGB 12.1 2022    HCT 37.0 2022    MCV 95 2022    EOSINOPHIL 0.4 2022         [x]  Independent interpretation of test:  CT Sinuses without Contrast  Order: [id number]   Status: Final result     Visible to patient: Yes (seen)     Next appt: Today at 11:00 AM in  Urology (Donna Ramirez NP)     Dx: Chronic maxillary sinusitis     1 Result Note     2 Follow-up Encounters    Details    Reading Physician Reading Date Result Priority   Zeke Mccarthy Jr., MD  [phone] 6/2/2022 Routine     Narrative & Impression  EXAMINATION:  CT SINUSES WITHOUT CONTRAST     CLINICAL HISTORY:  Chronic maxillary sinusitisSinusitis, chronic or recurrent;     TECHNIQUE:  Axial CT images were obtained through the face without administration of intravenous contrast. Coronal and sagittal reformats were also acquired.     COMPARISON:  None     FINDINGS:  Postoperative changes from prior bilateral internal craniotomies.  Aneurysm clip within the left MCA distribution.  Prior repair of the lateral wall of the left orbit.  There are screws within the alveolar ridge of the right maxilla.     The frontal sinus is clear.  The ethmoid air cells are well aerated.  The sphenoid sinus is clear with patent sphenoethmoidal recesses.  Mild polypoid mucosal thickening of the left maxillary sinus measuring up to 6 mm.  The ostiomeatal units are patent.  Likely old left nasal septal fracture with leftward deviation.     The orbital walls are intact. The globes are normal in appearance. The intraconal fat appears normal.     Impression:     1. Polypoid mucosal thickening of the left maxillary sinus measuring up to 6 mm.  The ostiomeatal units are patent.  2. 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  All CT scans at this facility use dose modulation, iterative reconstruction, and/or weight base dosing when appropriate to reduce radiation dose to as low as reasonably achievable.        Electronically signed by: Zeke Mccarthy Jr., MD  Date:                                            06/02/2022  Time:                                           14:36       Due to indication in patient's history, presentation or risk factors,  a fiber optic exam was performed.    SEPARATE PROCEDURE NOTE:    ANESTHESIA:  Topical  xylocaine with janiya-synephrine  FINDINGS:  Moderate to severe inaterarytenoid erythema and edema    PROCEDURE:  After verbal consent was obtained, the flexible scope was passed through the patient's nasal cavity without difficulty.  The nasopharynx (adenoid pad) and eustachian tube orifices were first visualized and were found to be normal, without masses or irregularity.  The posterior pharyngeal wall and base of tongue were then examined and no mass or irregular tissue was seen.  The scope was then advanced to the larynx, and the epiglottis, valleculae, and piriform sinuses were normal, without masses or mucosal irregularity.  The false vocal folds and true vocal folds were then examined and were found to have normal mobility (full abduction and adduction) and no masses or mucosal irregularity was seen.  The interartyenoid area had moderate to severe edema and erythema consistent with reflux.          Assessment & Plan:   Laryngopharyngeal reflux (LPR)    Dysphonia    Chronic cough    History of COVID-19    Other orders  -     pantoprazole (PROTONIX) 40 MG tablet; Take 1 tablet (40 mg total) by mouth 2 (two) times daily.  Dispense: 60 tablet; Refill: 11        She has COPD and poorly controlled reflux which are likely the prime contributors to her chronic coughing and intermittent hoarseness.  I feel like her reflux is probably the dominant etiology for these symptoms based on her endoscopic exam.  We agreed to increase her PPI to b.i.d. she will follow-up in 6 weeks for re-evaluation.  If she does not improve adequately, we will likely recommend a GI evaluation.

## 2022-06-17 DIAGNOSIS — I10 ESSENTIAL HYPERTENSION: ICD-10-CM

## 2022-06-17 RX ORDER — METOPROLOL TARTRATE 100 MG/1
100 TABLET ORAL 2 TIMES DAILY
Qty: 60 TABLET | Refills: 3 | Status: SHIPPED | OUTPATIENT
Start: 2022-06-17 | End: 2022-11-14 | Stop reason: SDUPTHER

## 2022-06-17 RX ORDER — DILTIAZEM HYDROCHLORIDE 120 MG/1
120 CAPSULE, EXTENDED RELEASE ORAL DAILY
Qty: 30 CAPSULE | Refills: 3 | Status: SHIPPED | OUTPATIENT
Start: 2022-06-17 | End: 2023-06-30

## 2022-06-27 ENCOUNTER — TELEPHONE (OUTPATIENT)
Dept: CARDIOLOGY | Facility: CLINIC | Age: 77
End: 2022-06-27
Payer: MEDICARE

## 2022-06-27 NOTE — TELEPHONE ENCOUNTER
----- Message from Arianna Castellon sent at 6/27/2022 11:38 AM CDT -----  Contact: self/[phone]  Patient is returning a phone call.  Who left a message for the patient: ma  Does patient know what this is regarding:  med's test  Would you like a call back, or a response through your [facility] portal?:   call back  Comments:      Please advice

## 2022-07-27 ENCOUNTER — TELEPHONE (OUTPATIENT)
Dept: OTOLARYNGOLOGY | Facility: CLINIC | Age: 77
End: 2022-07-27
Payer: MEDICARE

## 2022-07-27 DIAGNOSIS — J30.9 CHRONIC ALLERGIC RHINITIS: Primary | ICD-10-CM

## 2022-07-27 RX ORDER — LEVOCETIRIZINE DIHYDROCHLORIDE 5 MG/1
5 TABLET, FILM COATED ORAL 2 TIMES DAILY
Qty: 60 TABLET | Refills: 5 | Status: SHIPPED | OUTPATIENT
Start: 2022-07-27 | End: 2022-08-26

## 2022-07-27 NOTE — TELEPHONE ENCOUNTER
Call received from patient.  States she decided to increase her Xyzal to twice a day and notices it is more effective.  Without it she has a constant cough.  She is wondering how high a dose she can take.  And if her current rx can be increased and a new rx be sent into to her preferred pharmacy.  Preferred pharmacy is up to date in the system.

## 2022-07-27 NOTE — TELEPHONE ENCOUNTER
Pt advised that new rx has been sent in.  Did advise pt that there is a chance the insurance could deny paying for the additional tablets.  She verbalized understanding.

## 2022-08-01 ENCOUNTER — TELEPHONE (OUTPATIENT)
Dept: PAIN MEDICINE | Facility: CLINIC | Age: 77
End: 2022-08-01
Payer: MEDICARE

## 2022-08-22 ENCOUNTER — TELEPHONE (OUTPATIENT)
Dept: PULMONOLOGY | Facility: CLINIC | Age: 77
End: 2022-08-22
Payer: MEDICARE

## 2022-08-22 DIAGNOSIS — J45.909 ASTHMA, UNSPECIFIED ASTHMA SEVERITY, UNSPECIFIED WHETHER COMPLICATED, UNSPECIFIED WHETHER PERSISTENT: Primary | ICD-10-CM

## 2022-08-22 NOTE — TELEPHONE ENCOUNTER
----- Message from Tammy Engle sent at 8/22/2022  7:11 AM CDT -----  Contact: self/[phone]  Type:  Sooner Apoointment Request    Caller is requesting a sooner appointment.  Caller declined first available appointment listed below.  Caller will not accept being placed on the waitlist and is requesting a message be sent to doctor.  Name of Caller:Shireen Felix  When is the first available appointment?  Symptoms:follow-up from the hospital  Would the patient rather a call back or a response via [facility]? Call back   Best Call Back Number:[phone]  Additional Information: patient states she has pneumonia. Please call her back at [phone]. Thanks/ar

## 2022-08-26 ENCOUNTER — HOSPITAL ENCOUNTER (OUTPATIENT)
Dept: RADIOLOGY | Facility: HOSPITAL | Age: 77
Discharge: HOME | End: 2022-08-26
Attending: NURSE PRACTITIONER
Payer: MEDICARE

## 2022-08-26 ENCOUNTER — TELEPHONE (OUTPATIENT)
Dept: PULMONOLOGY | Facility: CLINIC | Age: 77
End: 2022-08-26
Payer: MEDICARE

## 2022-08-26 ENCOUNTER — OFFICE VISIT (OUTPATIENT)
Dept: PULMONOLOGY | Facility: CLINIC | Age: 77
End: 2022-08-26
Payer: MEDICARE

## 2022-08-26 VITALS
HEART RATE: 70 BPM | WEIGHT: 130.06 LBS | HEIGHT: 64 IN | RESPIRATION RATE: 20 BRPM | BODY MASS INDEX: 22.2 KG/M2 | OXYGEN SATURATION: 94 %

## 2022-08-26 DIAGNOSIS — J45.40 MODERATE PERSISTENT ASTHMA WITHOUT COMPLICATION: Primary | ICD-10-CM

## 2022-08-26 DIAGNOSIS — J84.112 UIP (USUAL INTERSTITIAL PNEUMONITIS): ICD-10-CM

## 2022-08-26 DIAGNOSIS — J45.41 MODERATE PERSISTENT ASTHMA WITH (ACUTE) EXACERBATION: ICD-10-CM

## 2022-08-26 DIAGNOSIS — J20.9 ACUTE PURULENT BRONCHITIS: ICD-10-CM

## 2022-08-26 PROCEDURE — 71046 X-RAY EXAM CHEST 2 VIEWS: CPT | Mod: TC

## 2022-08-26 PROCEDURE — 99214 OFFICE O/P EST MOD 30 MIN: CPT | Mod: S$GLB,,, | Performed by: NURSE PRACTITIONER

## 2022-08-26 PROCEDURE — 1160F RVW MEDS BY RX/DR IN RCRD: CPT | Mod: CPTII,S$GLB,, | Performed by: NURSE PRACTITIONER

## 2022-08-26 PROCEDURE — 1159F MED LIST DOCD IN RCRD: CPT | Mod: CPTII,S$GLB,, | Performed by: NURSE PRACTITIONER

## 2022-08-26 PROCEDURE — 99999 PR PBB SHADOW E&M-EST. PATIENT-LVL V: ICD-10-PCS | Mod: PBBFAC,,, | Performed by: NURSE PRACTITIONER

## 2022-08-26 PROCEDURE — 3072F LOW RISK FOR RETINOPATHY: CPT | Mod: CPTII,S$GLB,, | Performed by: NURSE PRACTITIONER

## 2022-08-26 PROCEDURE — 71046 XR CHEST PA AND LATERAL: ICD-10-PCS | Mod: 26,,, | Performed by: RADIOLOGY

## 2022-08-26 PROCEDURE — 3288F PR FALLS RISK ASSESSMENT DOCUMENTED: ICD-10-PCS | Mod: CPTII,S$GLB,, | Performed by: NURSE PRACTITIONER

## 2022-08-26 PROCEDURE — 1101F PR PT FALLS ASSESS DOC 0-1 FALLS W/OUT INJ PAST YR: ICD-10-PCS | Mod: CPTII,S$GLB,, | Performed by: NURSE PRACTITIONER

## 2022-08-26 PROCEDURE — 1160F PR REVIEW ALL MEDS BY PRESCRIBER/CLIN PHARMACIST DOCUMENTED: ICD-10-PCS | Mod: CPTII,S$GLB,, | Performed by: NURSE PRACTITIONER

## 2022-08-26 PROCEDURE — 3288F FALL RISK ASSESSMENT DOCD: CPT | Mod: CPTII,S$GLB,, | Performed by: NURSE PRACTITIONER

## 2022-08-26 PROCEDURE — 1159F PR MEDICATION LIST DOCUMENTED IN MEDICAL RECORD: ICD-10-PCS | Mod: CPTII,S$GLB,, | Performed by: NURSE PRACTITIONER

## 2022-08-26 PROCEDURE — 71046 X-RAY EXAM CHEST 2 VIEWS: CPT | Mod: 26,,, | Performed by: RADIOLOGY

## 2022-08-26 PROCEDURE — 1101F PT FALLS ASSESS-DOCD LE1/YR: CPT | Mod: CPTII,S$GLB,, | Performed by: NURSE PRACTITIONER

## 2022-08-26 PROCEDURE — 99999 PR PBB SHADOW E&M-EST. PATIENT-LVL V: CPT | Mod: PBBFAC,,, | Performed by: NURSE PRACTITIONER

## 2022-08-26 PROCEDURE — 99214 PR OFFICE/OUTPT VISIT, EST, LEVL IV, 30-39 MIN: ICD-10-PCS | Mod: S$GLB,,, | Performed by: NURSE PRACTITIONER

## 2022-08-26 PROCEDURE — 3072F PR LOW RISK FOR RETINOPATHY: ICD-10-PCS | Mod: CPTII,S$GLB,, | Performed by: NURSE PRACTITIONER

## 2022-08-26 RX ORDER — OMEPRAZOLE 40 MG/1
40 CAPSULE, DELAYED RELEASE ORAL EVERY MORNING
COMMUNITY
Start: 2022-08-02 | End: 2023-05-26

## 2022-08-26 RX ORDER — DOXYCYCLINE 100 MG/1
100 CAPSULE ORAL 2 TIMES DAILY
COMMUNITY
Start: 2022-08-20 | End: 2023-03-03

## 2022-08-26 RX ORDER — PROMETHAZINE HYDROCHLORIDE AND DEXTROMETHORPHAN HYDROBROMIDE 6.25; 15 MG/5ML; MG/5ML
SYRUP ORAL
COMMUNITY
Start: 2022-08-20 | End: 2023-05-16

## 2022-08-26 RX ORDER — PREDNISONE 20 MG/1
TABLET ORAL
Qty: 20 TABLET | Refills: 0 | Status: SHIPPED | OUTPATIENT
Start: 2022-08-26 | End: 2022-09-16

## 2022-08-26 RX ORDER — LEVOFLOXACIN 750 MG/1
750 TABLET ORAL DAILY
Qty: 5 TABLET | Refills: 0 | Status: SHIPPED | OUTPATIENT
Start: 2022-08-26 | End: 2022-08-31

## 2022-08-26 NOTE — TELEPHONE ENCOUNTER
Telephoned advised stable chest xray, no acute findings.  Patient states understanding   Continue treatment  Planned from visit same day

## 2022-08-26 NOTE — PROGRESS NOTES
Subjective:      Established patient    Patient ID: Shireen Felix is a 76 y.o. female.  Patient Active Problem List   Diagnosis    Nasal septal perforation    Postherpetic neuralgia    Conductive hearing loss, unilateral    Chronic otitis media    Chronic allergic rhinitis    Elevated IgE level    GERD (gastroesophageal reflux disease)    History of cerebral aneurysm repair    History of tobacco abuse    HTN (hypertension)    Iron deficiency anemia    Spinal stenosis of lumbar region with neurogenic claudication    Mucopurulent chronic bronchitis    UIP (usual interstitial pneumonitis)    Recurrent falls    DM (diabetes mellitus) type II controlled with renal manifestation    CAD in native artery    Neuroforaminal stenosis of lumbar spine    Lumbar radiculopathy    History of stroke    Hypertension associated with diabetes    Normocytic anemia    Hyponatremia    Debility    Osteoarthritis of left wrist    Chronic back pain    Chronic pain syndrome    Decreased GFR    Age-related osteoporosis without current pathological fracture    Shoulder arthritis    Spontaneous ecchymosis    Bilateral carpal tunnel syndrome    Cervical radiculopathy    Cubital tunnel syndrome on right    Abnormal chest x-ray    Depression    Hyperglycemia due to type 2 diabetes mellitus    Imbalance    Long term current use of insulin    Mild protein-calorie malnutrition    Mixed hyperlipidemia    Moderate recurrent major depression    Orthostatic hypotension    Other constipation    Overactive bladder    Perforation of right tympanic membrane    Primary insomnia    Thrush, oral    Vitamin D deficiency    Polypharmacy    Multiple neurological symptoms    DDD (degenerative disc disease), thoracolumbar    DDD (degenerative disc disease), thoracic    DDD (degenerative disc disease), lumbosacral    DDD (degenerative disc disease), lumbar    DDD (degenerative disc disease), cervical     History of cerebral aneurysm    Hx of craniotomy    Temporal lobe epilepsy    Syncope    Spinal stenosis    Fluctuating blood pressure    Sacroiliitis, not elsewhere classified    Chronic diastolic heart failure    Moderate persistent asthma without complication       Problem list has been reviewed.    Chief Complaint: No chief complaint on file.      RUBY Felix is a 76 y.o. female presents for acute care visit related to flare of cough.  Developed URI 8/20/2022 [facility]. Neg covid and influenza.   Treatment with zpack and promethazine dm.    Patient reports no improvement over the past 6 days since onset    She is followed in pulmonary for chronic UIP, moderate persistent asthma with chronic bronchitis.     Patients reports stable NYHA II dyspnea     + covid 19 + 12/31/2021, 2/24/2022, July 2022.     The patient has currently have symptoms.   Has seen better results with levaquin over zpack with past flares      She reports daily productive cough clear and yellow. wheeze intermittent. Denies hemoptysis.    Her current respiratory therapy regimen is TRELEGY 200 mcg. VENTOLIN inhaler, albuterol nebs, Daliresp 500 mcg.  She is  adherent with her regimen.      Patient questions prednisone low dose for her arthritis and her acute flare of sinus and chest. She was on 5 mg prednisone prior by rheumatology. She states with change of rheumatology physicians, Prednisone 5 mg daily has not been reordered yet. She is now followed by Dr. Hernandez. 5/6/2022, had steroid joint right shoulder injection 40 mg, then 5/9/2022, 8 mg IM steroid with ENT.  Advised patient since lung flare post sinus infection would provide Prednisone 5 mg with taper, and if daily dose to be continued to follow up with Dr. Hernandez, Rheumatology.         Asthma Control Test  In the past 4  weeks, how much of the time did your asthma keep you from getting as much done at work, school or at home?: All of the  time  During the past 4 weeks, how often have you had shortness of breath?: More than once a day  During the past 4 weeks, how often did your asthma symptoms (wheezing, couging, shortness of breath, chest tightness or pain) wake you up at night or earlier than usual in the morning?: 4 or more nights a week  During the past 4 weeks, how often have you used your rescue inhaler or nebulizer medication (such as albuterol)?: 3 or more times per day  How would you rate your asthma control during the past 4 weeks?: Not controlled at all  If your score is 19 or less, your asthma may not be under control: 5      Immunization status reviewed and up to date.       A full  review of systems, past , family  and social histories was performed except as mentioned in the note above, these are non contributory to the main issues discussed today.     Previous Report Reviewed: lab reports, office notes and radiology reports     The following portions of the patient's history were reviewed and updated as appropriate: She  has a past medical history of Abnormal ECG (8/5/2019), Aneurysm, Anticoagulant long-term use, Arthritis, CAD in native artery (8/5/2019), COPD (chronic obstructive pulmonary disease), Diabetes mellitus, Fall (10/10/2019), Glaucoma, Hypertension, Seizures, Shingles (05/27/2017), and Stroke.  She  has a past surgical history that includes Brain surgery; Hysterectomy; Transforaminal epidural injection of steroid (Bilateral, 7/23/2019); Cardiac catheterization; Coronary angioplasty; Epidural steroid injection into lumbar spine (Bilateral, 11/12/2019); Injection of anesthetic agent around medial branch nerves innervating lumbar facet joint (Bilateral, 1/31/2020); Transforaminal epidural injection of steroid (Bilateral, 3/10/2020); Transforaminal epidural injection of steroid (Right, 6/19/2020); Injection of joint (Bilateral, 7/9/2020); and Injection of anesthetic agent into sacroiliac joint (Right, 11/16/2021).  Her family  history includes No Known Problems in her father and mother.  She  reports that she quit smoking about 18 years ago. Her smoking use included cigarettes. She has a 10.00 pack-year smoking history. She has never used smokeless tobacco. She reports that she does not drink alcohol and does not use drugs.  She has a current medication list which includes the following prescription(s): albuterol, albuterol, aptiom, aptiom, atorvastatin, cholecalciferol (vitamin d3), clopidogrel, daliresp, denosumab, diltiazem, doxycycline, escitalopram oxalate, fluticasone propionate, trelegy ellipta, gabapentin, ketoconazole, latanoprost, magnesium oxide, metformin, metoprolol tartrate, montelukast, myrbetriq, nitroglycerin, omeprazole, pantoprazole, potassium chloride sa, promethazine-dextromethorphan, timolol maleate 0.5%, tizanidine, triamcinolone acetonide 0.025%, valsartan, acetaminophen, amlodipine, diclofenac, hydrochlorothiazide, ipratropium, levocetirizine, levofloxacin, prednisone, and ranolazine, and the following Facility-Administered Medications: acetaminophen.  She is allergic to penicillins, adhesive, betadine [povidone-iodine], doxycycline, oxycodone, sulfa (sulfonamide antibiotics), and zanaflex [tizanidine]..    Review of Systems   Constitutional: Negative for fever and chills.   HENT: Positive for hearing loss. Negative for nosebleeds, rhinorrhea and congestion.    Eyes: Negative for redness.   Respiratory: Positive for cough, sputum production, wheezing, dyspnea on extertion and use of rescue inhaler. Negative for choking.    Genitourinary: Negative for hematuria.   Endocrine: Diabetes melllitus Negative for cold intolerance.    Musculoskeletal: Positive for arthralgias and back pain.   Skin: Negative for rash.   Gastrointestinal: Positive for acid reflux. Negative for vomiting.   Neurological: Negative for syncope and headaches.        History of stroke  Seizure disorder.  Post herpetic neuralgia   Hematological:  "Negative for adenopathy. Bleeds easily and excessive bruising.   Psychiatric/Behavioral: Negative for confusion.        Objective:     Pulse 70   Resp 20   Ht 5' 4" (1.626 m)   Wt 59 kg (130 lb 1.1 oz)   LMP  (LMP Unknown)   SpO2 (!) 94%   BMI 22.33 kg/m²   Body mass index is 22.33 kg/m².     Physical Exam  Constitutional:       Appearance: She is well-developed.   HENT:      Head: Normocephalic and atraumatic.      Right Ear: Tympanic membrane normal.      Left Ear: Tympanic membrane normal.   Eyes:      Pupils: Pupils are equal, round, and reactive to light.   Cardiovascular:      Rate and Rhythm: Normal rate and regular rhythm.      Heart sounds: Normal heart sounds. No murmur heard.  Pulmonary:      Effort: Pulmonary effort is normal. No respiratory distress.   Abdominal:      General: Bowel sounds are normal.      Palpations: Abdomen is soft.   Musculoskeletal:         General: No tenderness. Normal range of motion.      Cervical back: Normal range of motion and neck supple.   Skin:     General: Skin is warm and dry.      Coloration: Skin is not pale.   Neurological:      Mental Status: She is alert and oriented to person, place, and time.   Psychiatric:         Behavior: Behavior normal.         Personal Diagnostic Review    X-Ray Chest PA And Lateral  Narrative: EXAMINATION:  XR CHEST PA AND LATERAL    CLINICAL HISTORY:  Moderate persistent asthma with (acute) exacerbation    TECHNIQUE:  PA and lateral views of the chest were performed.    COMPARISON:  05/16/2022    FINDINGS:  The lungs are clear and free of infiltrate.  No pleural effusion or pneumothorax. The heart is not enlarged. There is tortuosity of the descending thoracic aorta.  Impression: 1.  No acute cardiopulmonary process.    Electronically signed by: David Wallace DO  Date:    08/26/2022  Time:    10:22      Results for orders placed during the hospital encounter of 03/15/22    X-Ray Chest PA And Lateral    Narrative  EXAMINATION:  XR " CHEST PA AND LATERAL    CLINICAL HISTORY:  Unspecified asthma, uncomplicated    TECHNIQUE:  PA and lateral views of the chest were performed.    COMPARISON:  2021    FINDINGS:  The cardiac and mediastinal silhouettes appear within normal limits.   Faint chronic appearing interstitial opacities seen in the periphery of the lungs bilaterally with no appreciable change from prior.  No focal parenchymal consolidation or definite pleural effusion visualized.  No acute osseous findings demonstrated.    Impression  Unchanged appearance of the chest as above.  No acute findings      Electronically signed by: Neri Husain MD  Date:    03/15/2022  Time:    11:24          XR CHEST PA AND LATERAL: 20:      Coarsening of the bronchovascular markings again noted.  No confluent airspace disease.  No pleural effusion.  Cardiomediastinal silhouette and osseous structures are stable in appearance.    Pulmonary function tests:20 :  FEV1: 2.13 (103.5 % predicted), FVC:  2.44 (91.0 % predicted), FEV1/FVC:  87.75, T.47 ( 90.1 % predicted) RV /TLC 45 ( 102% predicted, DLCO: 14.51 (70.2 % predicted)  Normal spirometry. Lung volumes not done. Diffusion capacity is mildly reduced but corrects for alveolar volume.         CT of chest performed on 20 with contrast:       1. Negative CTA chest.  No pulmonary embolism.  2. No active infiltrate.  Interstitial lung disease with coarsening of the peripheral septal markings, most notable bibasilar distribution, with mild bibasilar honeycombing.  Stable 6 mm noncalcified pulmonary nodule right lower lobe.  3. Multitude bilateral renal cortical cysts.  4. Multilevel degenerative thoracolumbar spine.  Multiple stable thoracic disc herniations, most notably at T6-7, with moderate central protrusion resulting in spinal canal stenosis (8 mm).  Scattered Schmorl's nodes.  Stable mild compression deformity/loss of height at T12.  There is also mild compression deformity of  L2, new compared with February 2020.      Assessment / Plan:       Discussed diagnosis, its evaluation, treatment and usual course. All questions answered.  Requested Prescriptions     Signed Prescriptions Disp Refills    predniSONE (DELTASONE) 20 MG tablet 20 tablet 0     Sig: 3 daily x 3 days, 2 daily x 3 days, 1 daily x 3 days, 1/2 daily x 4 days.    levoFLOXacin (LEVAQUIN) 750 MG tablet 5 tablet 0     Sig: Take 1 tablet (750 mg total) by mouth once daily. for 5 days       Problem List Items Addressed This Visit     UIP (usual interstitial pneumonitis)    Relevant Orders    X-Ray Chest PA And Lateral (Completed)    Moderate persistent asthma without complication - Primary    Overview     Trelegy 200 mcg triple therapy. Continue Daliresp, Albuterol inhaler, albuterol nebs. .             Relevant Medications    predniSONE (DELTASONE) 20 MG tablet      Other Visit Diagnoses     Moderate persistent asthma with (acute) exacerbation        Relevant Medications    predniSONE (DELTASONE) 20 MG tablet    Other Relevant Orders    X-Ray Chest PA And Lateral (Completed)    Acute purulent bronchitis        Relevant Medications    levoFLOXacin (LEVAQUIN) 750 MG tablet        continue Trelegy 200 mcg triple therapy. Continue Daliresp, Albuterol inhaler, albuterol nebs. .  Acute on chronic sinusitis and purulent bronchitis  complete levaquin 750  Prednisone taper   Non seasonal allergies with post nasal drip and rhinorrhea  continue Atrovent nasal and Flonase.   8/26/2022  Chest xray stable chronic findings.     No LOS data to display  This includes face to face time and non-face to face time preparing to see the patient (eg, review of tests), obtaining and/or reviewing separately obtained history, documenting clinical information in the electronic or other health record, independently interpreting results and communicating results to the patient/family/caregiver, or care coordinator.    Follow up in about 2 months (around  10/26/2022) for Asthma/ILD cpft as scheduled.

## 2022-08-26 NOTE — TELEPHONE ENCOUNTER
----- Message from Pham Shelby MA sent at 8/26/2022 10:17 AM CDT -----    ----- Message -----  From: Claudio Lucas  Sent: 8/26/2022   9:59 AM CDT  To: Sravanthi Zafar    Good Morning,     Ms. Felix wanted me to ask y'all if you could call her at [phone] with her chest X-Ray results instead of posting them to [employer]. She stated that she is unable to access [employer] at this time due to issues she is having with her phone.       Thank you,     Claudio  Patient Access Registrar - Lab Desk.   [phone]

## 2022-09-10 NOTE — TELEPHONE ENCOUNTER
Pt becoming increasingly agitated. Pt awake, reaching for tube and trying to get up. Propofol restarted. Pt currently on propofol 15 mcg/kg/min, precedex 1.3 mcg/kg/hr, and fentanyl 75 mcg/hr. Total UOP 1400 cc. Pt received lasix in previous shift. Rhonchi heard prior. Lungs sound clear now. 200 cc bile out cont.  Wall suction She was going to therapy and they finally gotten her a little better . She was dismissed from [facility] and now she goes to outpatient therapy 3 times a week . Can you fill her percocet and the flexeril to help with the pain as needed she will be having the back injections 11/12/2019.

## 2022-09-12 DIAGNOSIS — I25.118 CORONARY ARTERY DISEASE OF NATIVE ARTERY OF NATIVE HEART WITH STABLE ANGINA PECTORIS: ICD-10-CM

## 2022-09-12 NOTE — TELEPHONE ENCOUNTER
----- Message from Papi Mitchell sent at 9/12/2022  9:23 AM CDT -----  Contact: Shireen  .Type:  RX Refill Request    Who Called:  Shireen  Refill or New Rx: refill  RX Name and Strength: ranolazine (RANEXA) 1,000 mg Tb12  How is the patient currently taking it? (ex. 1XDay): as prescribed  Is this a 30 day or 90 day RX:  90    Preferred Pharmacy with phone number:[place], [place] - [address] [facility]  [address] [facility] [address]  Phone: [phone] Fax: [phone]    Local or Mail Order: local  Ordering Provider: Dr Chaudhari  Would the patient rather a call back or a response via [facility]?  call  Best Call Back Number: .[phone]   Additional Information:  requests a call back to be notified, Follow up scheduled for November 2022. Will be traveling soon.   Thanks,  RP

## 2022-09-12 NOTE — TELEPHONE ENCOUNTER
Good Morning/Afternoon,     Pt is requesting for a refill of: Tizanidine (Zanadine) 4mg  Last filed:3/28/22  Last encounter:5/6/22  Up coming apt: 10/4/22  Pharmacy: [facility] - [address] [id number] [address]  Is this something you can do?      Ina House  Medical Assistant

## 2022-09-13 RX ORDER — RANOLAZINE 1000 MG/1
1000 TABLET, EXTENDED RELEASE ORAL 2 TIMES DAILY
Qty: 180 TABLET | Refills: 3 | Status: SHIPPED | OUTPATIENT
Start: 2022-09-13 | End: 2023-07-19

## 2022-09-13 RX ORDER — TIZANIDINE 4 MG/1
TABLET ORAL
Qty: 60 TABLET | Refills: 0 | Status: SHIPPED | OUTPATIENT
Start: 2022-09-13 | End: 2022-11-02 | Stop reason: SDUPTHER

## 2022-09-13 NOTE — TELEPHONE ENCOUNTER
Good Afternoon,     Pt is requesting for a refill of: tiZANidine (ZANAFLEX) 4 MG tablet  Last filed:5/10/22  Last encounter: 5/06/22  Up coming apt: 10/04/22  Pharmacy:[facility]  [address] - [address] [facility]  Is this something you can do?      Jimi Stockton   Medical Assistant

## 2022-09-13 NOTE — TELEPHONE ENCOUNTER
----- Message from Wendy Frye sent at 9/13/2022  9:52 AM CDT -----  Contact: self/[phone]  Type:  RX Refill Request    Who Called: Patient  Refill or New Rx:Refill  RX Name and Strength:tiZANidine (ZANAFLEX) 4 MG tablet  How is the patient currently taking it? (ex. 1XDay): Once a day  Is this a 30 day or 90 day RX:30-90 day  Preferred Pharmacy with phone number:   [facility] - [place], [place] - [address] [facility]  [address] [facility] [address]  Phone: [phone] Fax: [phone]  Local or Mail Order:Local  Ordering Provider:Dr Francisco Oshea  Would the patient rather a call back or a response via [facility]? Call back  Best Call Back Number:[phone]  Additional Information: patient is going out of town

## 2022-09-16 DIAGNOSIS — J45.40 MODERATE PERSISTENT ASTHMA WITHOUT COMPLICATION: ICD-10-CM

## 2022-09-16 DIAGNOSIS — I25.118 CORONARY ARTERY DISEASE OF NATIVE ARTERY OF NATIVE HEART WITH STABLE ANGINA PECTORIS: ICD-10-CM

## 2022-09-16 DIAGNOSIS — J45.41 MODERATE PERSISTENT ASTHMA WITH (ACUTE) EXACERBATION: ICD-10-CM

## 2022-09-16 RX ORDER — PREDNISONE 20 MG/1
TABLET ORAL
Qty: 20 TABLET | Refills: 0 | Status: SHIPPED | OUTPATIENT
Start: 2022-09-16 | End: 2023-05-16

## 2022-09-16 NOTE — TELEPHONE ENCOUNTER
----- Message from Pham Shelby MA sent at 9/16/2022 11:47 AM CDT -----  Contact: Shireen    ----- Message -----  From: Blayne Delgadillo  Sent: 9/16/2022  11:30 AM CDT  To: Sravanthi SMITH Staff    Type:  RX Refill Request    Who Called: Shireen  Refill or New Rx: refill   RX Name and Strength:predniSONE (DELTASONE) 5 MG tablet  How is the patient currently taking it? (ex. 1XDay): everyday   Is this a 30 day or 90 day RX:90 day   Preferred Pharmacy with phone number:  [facility] - [place] - [address] [facility]  [address] [facility] [address]  Phone: [phone] Fax: [phone]   Local or Mail Order:Local   Ordering Provider:  Would the patient rather a call back or a response via [facility]? Call back   Best Call Back Number:[phone]  Additional Information: patient is going out of town Sunday and wants get the medication today.    Thanks  CF

## 2022-09-16 NOTE — TELEPHONE ENCOUNTER
Provided prednisone taper for patient trip leaving this weekend.   She is to follow up with Dr. Hernandez, Rheumatolology for her Prednisone 5 mg daily.     Requested Prescriptions     Signed Prescriptions Disp Refills    predniSONE (DELTASONE) 20 MG tablet 20 tablet 0     Sig: TAKE 3 TABLETS DAILY FOR 3 DAYS then 2 TABLETS DAILY FOR 3 DAYS then 1 TABLET DAILY FOR 3 DAYS then 1/2 TABLET DAILY FOR 4 DAYS     Authorizing Provider: HARIS GILLETTE     1. Moderate persistent asthma without complication  predniSONE (DELTASONE) 20 MG tablet      2. Moderate persistent asthma with (acute) exacerbation  predniSONE (DELTASONE) 20 MG tablet

## 2022-09-16 NOTE — TELEPHONE ENCOUNTER
----- Message from Genia Galicia sent at 9/16/2022  3:18 PM CDT -----  Contact: Shireen  The patient will be leaving Sunday and need her RX: predniSONE (DELTASONE) 20 MG tablet filled today please.      Please call Shireen at [phone] (home)      Thanks

## 2022-09-19 RX ORDER — RANOLAZINE 1000 MG/1
1000 TABLET, EXTENDED RELEASE ORAL 2 TIMES DAILY
Qty: 180 TABLET | Refills: 3 | OUTPATIENT
Start: 2022-09-19

## 2022-09-30 ENCOUNTER — TELEPHONE (OUTPATIENT)
Dept: PAIN MEDICINE | Facility: CLINIC | Age: 77
End: 2022-09-30
Payer: MEDICARE

## 2022-09-30 NOTE — TELEPHONE ENCOUNTER
Reached out to patient to reschedule appointment because the provider will be in in procedures.  Apt has been made . All questions answered.     Jimi Stockton  Medical

## 2022-10-03 NOTE — PROGRESS NOTES
Established Patient Chronic Pain Clinic Visit    Chief Pain Complaint:  Neck Pain   Lumbar Back Pain   Facial Pain       Interval history 10/04/2022  Ms. Felix is a 76-year-old female with past medical history significant for cerebral aneurysm status post craniotomy and repair, cerebral vascular accident, left V1 distribution post herpetic neuralgia, depression, glaucoma, asthma/COPD, coronary artery disease, GERD, nicotine dependence, type 2 diabetes who presents to establish care, previous Dr. Dias and Dr. Fulton patient.  Today patient reports pain in the lower back and legs.  Pain is constant today is rated 7/10.  Patient reports pain in a bandlike distribution in the lower back which radiates down the posterior aspects of bilateral lower extremities in L5-S1 distribution to mid thigh.  Pain is described as burning and aching in nature.  Pain is exacerbated when moving from sitting to standing and standing and ambulating just a few feet.  Patient does report associated weakness in the lower extremities associated with her pain.  Patient reports she is able to ambulate with assistive device, walker only a few feet before requiring rest.  Pain has been improved with prior procedures, including medial branch block and transforaminal epidural steroid injection.  Patient is interested in pursuing repeat injection.  Of note patient has trialed medicinal marijuana with Dr. Mckeon and reports palpitations side effect.  Patient has discontinued tramadol, tizanidine and gabapentin.  Pain is improved with prednisone which she takes prescribed by her pulmonologist.    Interval history:  Dr. Fulton:  05/10/2021-05/06/2022  Shireen Felix is a 76 y.o. female  who is presenting with a chief complaint of Neck Pain. The patient began experiencing this problem insidiously, and the pain has been gradually worsening over the past 6 month(s). The pain is described as throbbing, cramping, aching and heavy and is located in the  bilateral cervical spine. Pain is intermittent and lasts hours. The  pain is nonradiating. The patient rates her pain a 7 out of ten and interferes with activities of daily living a 7 out of ten. Pain is exacerbated by rotation of the cervical spine, and is improved by rest. Patient reports no prior trauma, no prior spinal surgery      This patient is a 74 y.o. female who presents today complaining of the above noted pain/s. The patient describes the pain as follows.  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  She reports approximately 2 years ago she had to sudden deaths in the family which caused very stressful time for her and resulted in shingles rash in the left V1 distribution however she reports having excruciating pain in the left V1 and V2 distributions.  She denies having difficulty with eating food and brushing her teeth on the left side of her face however the left lateral side of her nose gets her excruciating pain.  She has been tried on numerous medications in the past including gabapentin, Lyrica, Valtrex, Celebrex, ibuprofen which have all provided minimal benefit however steroids have been most helpful.  Today she rates her pain as an 4/10 and describes a constant burning sensation the left side of her face in addition to radiation into her bilateral lower extremities, her right shoulder, her left upper extremity occasionally as a pins and needle sensation.  She does report having numbness and weakness in her left lower extremity.  She has been physical therapy which she completed in February of 2019 however this caused most of her low back and leg symptoms to worsen in addition to her post herpetic neuralgia to worsen.  She denies having bowel bladder difficulties.  Her symptoms are worse with activity such as walking in her somewhat improved with rest however  she had finds it difficult to get into a comfortable position. She has been using topical lidocaine patches and applying to the left side of her face which does provide significant benefit.  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     Shireen Felix is a 76 y.o. female  who is presenting with a chief complaint of lumbar spine. The patient began experiencing this problem insidiously, and the pain has been gradually worsening over the past 2 year(s). The pain is described as throbbing, shooting, burning, aching and electrical and is located in the bilateral lumbar spine. Pain is intermittent and lasts hours. The pain radiates to bilateral lower extremities. The patient rates her pain a 7 out of ten and interferes with activities of daily living a 6 out of ten. Pain is exacerbated by flexion of the lumbar spine, and is improved by rest. Patient reports no prior trauma, no prior spinal surgery     Interval HPI 06/03/2020: Dr. Dias:  Ms. Felix returns to clinic for follow-up.  She underwent a T12-L1 transforaminal epidural steroid injection on March 10, 2020 and she reports that this has provided significant pain relief for her which radiated into her right leg prior to the injection.  She does report that his symptoms have returned and the injections worn off her pain is currently rated 7/10.  She was scheduled to undergo bilateral lumbar radiofrequency ablation prior to corona virus however she would like to hold off on that at this time and pursue repeat injection in the low back.  Unfortunately she also reports that she had her wheelchair fall over a few weeks ago and this has resulted in bilateral shoulder pain.  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  She finds that  rest does provide some pain relief on activity causes her symptoms to worsen.  She has been able to walk significantly more after her most recent injection in March reported she is able to walk several steps before having to sit rest however this has a vast improvement from prior to the injection.    Interval HPI:  02/12/2020; Dr. Dias  Ms. Felix returns to clinic for follow-up.  She underwent bilateral lumbar medial branch blocks on January 31, 2020 and reports 100% symptomatic pain relief for approximately 1 week and his symptoms slowly began to return.  She continues to have some right-sided lower thoracic posterior pain which was not completely dressed with the medial branch blocks.  Today she rates her pain as an 8/10 which is located primarily in the axial lumbar spine and the lower right posterior thoracic region.  She denies having numbness weakness in the legs but does continue to have a constant aching and throbbing sensation in the low back.    Interval HPI 01/23/2020: Dr. Dias: Ms. Felix returns to clinic for follow-up.  She reports that she underwent bilateral L4/5 transforaminal epidural steroid injections in November 2019 reports ongoing 80% symptomatic pain relief.  She has also been participating physical therapy which she has found to be very helpful however 4 days ago she started to do some leg raise is in her bed and she felt severe pain in her low back which has not subsided.  She denies having any radiation except for around her flank into her anterior abdomen.  She feels like the pain sometimes comes straight through from her back to her abdomen.  Today she rates her pain as a 10/10 describes it as a constant aching and sharp pain. She has been using a heating pad which is minimally helpful and she has been holding off on physical therapy at this time. She denies having bowel bladder difficulty.     Interval History: Patient was seen on 8/4/19. At that time she underwent bilateral L3/4 TF  XANDER.  The patient reports that she is/was better following the procedure.  she reports 75% pain relief.  She had a fall, then pain increased. She is currently living at [facility]. She is doing at home therapy there, which is helping. She is in wheelchair now but hopes to transition to walker soon.  After the fall, she was not able to get out of the bed.      Initial History of Present Illness:  Dr. Dias  This patient is a 74 y.o. female who presents today complaining of the above noted pain/s. The patient describes the pain as follows.  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  She reports approximately 2 years ago she had to sudden deaths in the family which caused very stressful time for her and resulted in shingles rash in the left V1 distribution however she reports having excruciating pain in the left V1 and V2 distributions.  She denies having difficulty with eating food and brushing her teeth on the left side of her face however the left lateral side of her nose gets her excruciating pain.  She has been tried on numerous medications in the past including gabapentin, Lyrica, Valtrex, Celebrex, ibuprofen which have all provided minimal benefit however steroids have been most helpful.  Today she rates her pain as an 8/10 and describes a constant burning sensation the left side of her face in addition to radiation into her bilateral lower extremities, her right shoulder, her left upper extremity occasionally as a pins and needle sensation.  She does report having numbness and weakness in her left lower extremity.  She has been physical therapy which she completed in February of 2019 however this caused most of her low back and leg symptoms to worsen in addition to her post herpetic neuralgia to worsen.  She denies having bowel bladder difficulties.  Her symptoms are worse  with activity such as walking in her somewhat improved with rest however she had finds it difficult to get into a comfortable position. She has been using topical lidocaine patches and applying to the left side of her face which does provide significant benefit.  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    - pertinent negatives: No fever, No chills, No weight loss, No bladder dysfunction, No bowel dysfunction, No saddle anesthesia  - pertinent positives: generalized nonspecific Lower Extremity weakness bilaterally    - medications, other therapies tried (physical therapy, injections):     >> NSAIDs, Tylenol, Tramadol, Norco, gabapentin, lyrica, flexeril and medrol dose pack    >> Has previously undergone Physical Therapy      Pain injections:  -11/16/2021: Right-sided SI joint and greater trochanteric bursa injection; Dr. Fulton  -07/09/2020: Bilateral glenohumeral joint injection; Dr. Dias  -06/19/2020: Right-sided T12/L1 transforaminal epidural steroid injection; Dr. Dias  -03/10/2020:  T12/L1 transforaminal epidural steroid injection; Dr. Dias  -01/31/2020: Bilateral L3-5 medial branch blocks; Dr. Dias  -11/12/2019: Bilateral L4/5 transforaminal epidural steroid injection; Dr. Dias        Imaging / Labs / Studies (reviewed on 10/4/2022):  02/06/20    CT Lumbar Spine Without Contrast    Narrative  EXAMINATION:  CT LUMBAR SPINE WITHOUT CONTRAST    CLINICAL HISTORY:  Low back painLow back pain, >6wks conservative tx, persistent-progressive sx, surgical candidate;    FINDINGS:  The lumbar vertebral bodies demonstrate adequate alignment.Disc space narrowing from T11 through S1.No acute fractures are identified.    T11-T12: Disc space narrowing.  Broad-based disc bulge.  Old vertebral endplate fracture of T12.    T12-L1: Small disc protrusion paramedian to the right side.   Degenerative changes of the vertebral endplates and facet joints.    L1-L2: Old fracture of the inferior endplate of L1.  Mild posterior displacement of a fracture fragment minimally narrowing the AP diameter of the canal.  Gas in the disc space consistent with degenerating disc material.    L2-L3: Circumferential disc bulge.  Bilateral degenerative changes of the facets.  Hypertrophy of the ligamentum flavum.  Moderate to severe central spinal stenosis.  Bony neural foraminal narrowing without definite nerve root impingement.    L3-L4: Circumferential disc bulge.  Bilateral degenerative changes of the facets.  Degenerative changes of the vertebral endplates.  Severe central spinal stenosis.  Hypertrophy of the ligamentum flavum and degenerative changes of the facets.    L4-L5: Circumferential disc bulge.  Severe central spinal stenosis.  Bilateral degenerative changes of the facets.  Bilateral bony neural foraminal narrowing and possible bilateral L4 nerve root impingement.    L5-S1: Circumferential disc bulge.  Gas formation within the disc.  Moderate central spinal stenosis.  Bilateral degenerative changes of the facets.  Bilateral bony neural foraminal narrowing with possible bilateral L5 nerve root impingement.    Atherosclerotic abdominal aorta without aneurysmal dilatation.     07/31/19    CT Cervical Spine Without Contrast    FINDINGS:  Osteopenia.  Grade 2 degenerative spondylolisthesis at C3-C4.  Grade 1 degenerative spondylolisthesis at C4-C5.  Vertebral body height is normal.  No acute fractures. No prevertebral soft tissue swelling. Atlanto-occipital articulation is normal.  Congenitally patulous spinal canal.  No significant spinal stenosis.  Moderate left foraminal stenosis at C3-C4.    01/23/20    X-Ray Lumbar Spine Ap And Lateral  FINDINGS:  Levoscoliosis present.  Vertebral body heights stable.  Alignment unchanged without spondylolisthesis.  Similar appearing multilevel degenerative disc height  "loss and osteophyte findings noted.  Multilevel facet degenerative findings remain.  Aorta iliac atherosclerotic calcification.  Colonic constipation findings present.    6/04/19    X-Ray Cervical Spine AP And Lateral    FINDINGS:  Reversal normal C-spine curvature noted on the lateral view with visualization to the C7-T1 level.  Minimal anterior subluxation C4 on C5 noted.  There is multilevel marginal spurring and varying degrees of loss of disc height throughout the C-spine.  No acute fracture identified in prevertebral soft tissues, C1-2 articulation and odontoid appear within normal limits allowing for positioning.  Minimal vascular calcification identified on the left in the area of the carotid vessels.    Review of Systems:  CONSTITUTIONAL: patient denies any fever, chills, or weight loss  SKIN: patient denies any rash or itching  RESPIRATORY: patient denies having any shortness of breath  GASTROINTESTINAL: patient denies having any diarrhea, constipation, or bowel incontinence  GENITOURINARY: patient denies having any abnormal bladder function    MUSCULOSKELETAL:  - patient complains of the above noted pain/s (see chief pain complaint)    NEUROLOGICAL:   - pain as above  - strength in Upper and Lower extremities is decreased, BILATERALLY  - sensation in Upper extremities is intact, BILATERALLY  - patient denies any loss of bowel or bladder control      PSYCHIATRIC: patient denies any change in mood    Other:  All other systems reviewed and are negative      Physical Exam:  BP (!) 146/72   Pulse 68   Resp (!) 7   Ht 5' 4" (1.626 m)   Wt 60 kg (132 lb 4.4 oz)   LMP  (LMP Unknown)   BMI 22.71 kg/m²  (reviewed on 10/4/2022)  Physical Exam    GENERAL: Well appearing, in no acute distress, alert and oriented x3.  PSYCH:  Mood and affect appropriate.  SKIN: Skin color, texture, turgor normal, no rashes or lesions.  HEAD/FACE:  Normocephalic, atraumatic. Cranial nerves grossly intact.    CV: RRR with palpation " of the radial artery.  PULM: No evidence of respiratory difficulty, symmetric chest rise.  GI:  Soft and non-tender.    BACK: Straight leg raising in the sitting and supine positions is negative to radicular pain. pain to palpation over the facet joints of the lumbar spine or spinous processes.  Reduced range of motion with pain reproduction   EXTREMITIES:  Peripheral joint range of motion is reduced with pain with obvious instability in bilateral lower extremities.  No deformities, edema, or skin discoloration. Good capillary refill.  MUSCULOSKELETAL:  Unable to stand on heels and toes  hip, and knee provocative maneuvers are negative.  There is no pain with palpation over the sacroiliac joints bilaterally.  Gaenslen's, Distraction/Compression and  FABERs test is negative.  Facet loading test is positive bilaterally.   Bilateral upper and lower extremity strength is normal and symmetric.  No atrophy or tone abnormalities are noted.    RIGHT Lower extremity: Hip flexion 5/5, Hip Abduction 5/5, Hip Adduction 5/5, Knee extension 5/5, Knee flexion 5/5, Ankle dorsiflexion5/5, Extensor hallucis longus 5/5, Ankle plantarflexion 5/5  LEFT Lower extremity:  Hip flexion 5/5, Hip Abduction 5/5,Hip Adduction 5/5, Knee extension 5/5, Knee flexion 5/5, Ankle dorsiflexion 5/5, Extensor hallucis longus 5/5, Ankle plantarflexion 5/5  -Normal testing knee (patellar) jerk and ankle (achilles) jerk    NEURO: Bilateral upper and lower extremity coordination and muscle stretch reflexes are physiologic and symmetric. No loss of sensation is noted.  GAIT:  Patient presents in wheelchair today.  Antalgic gait.      Assessment:    Shireen Felix is a 76 y.o. year old female who is presenting with   Encounter Diagnoses   Name Primary?    DDD (degenerative disc disease), lumbar Yes    Lumbar facet arthropathy     Lumbar spondylosis     Lumbar radiculopathy, chronic     Postherpetic neuralgia          Plan:    1. Interventional:  Schedule  "for bilateral L3, L4, L5 medial branch block  as this has significantly helped axial back pain in the past.  We have discussed with significant relief she may be a candidate for radiofrequency ablation for more sustained relief.  Two weeks following schedule for bilateral L5/S1 transforaminal epidural steroid injection to see if this helps with radicular pain.  We have discussed the procedure, benefits and potential risk in detail.  Patient has elected to pursue this procedure.    Anticoagulation:  Yes, Plavix  Per ASA guidelines for secondary prophylaxis, patient can continue Plavix for lumbar medial branch block but will need to discontinue 5 days prior to lumbar transforaminal epidural steroid injection.  Will obtain cardiac clearance from Dr. Felix.    2. Pharmacologic:         -We will start nortriptyline 25 mg nightly.  We have discussed potential common side effects of duloxetine including nausea, drowsiness, excitement or anxiety, dry mouth, constipation or changes in appetite or weight.  Patient expresses understanding.      3. Rehabilitative:   -We discussed initiating physical therapy to help manage the patient/s painful condition. The patient was counseled that muscle strengthening will improve the long term prognosis in regards to pain and may also help increase range of motion and mobility. They were told that one of the goals of physical therapy is that they learn how to do the exercises so that they can do them independently at home daily upon completion. The patient's questions were answered and they were agreeable to this course. A referral for physical therapy was provided to the patient.      4. Diagnostic:  Relevant imaging reviewed and patient's questions answered.    5. Consult: - Patient is interested in the potential benefits of medicinal marijuana for his neuropathic pain.  I will refer the patient to Dr. Luis Alberto Mckeon (Steve) ([facility]; [phone])    6. Follow up:  4-6 weeks post " injection    Best wishes,    Signed:  Francisco Oshea MD      The above plan and management options were discussed at length with patient. Patient is in agreement with the above and verbalized understanding.    - I discussed the goals of interventional chronic pain management with the patient on today's visit. We discussed a multimodal and systematic approach to pain.  This includes diagnostic and therapeutic injections, adjuvant pharmacologic treatment, physical therapy, and at times psychiatry.  I emphasized the importance of regular exercise, core strengthening and stretching, diet and weight loss as a cornerstone of long-term pain management.    - This condition does not require this patient to take time off of work, and the primary goal of our Pain Management services is to improve the patient's functional capacity.  - Patient Questions: Answered all of the patient's questions regarding diagnoses, therapy, treatment and next steps    Disclaimer:  This note may have been prepared using voice recognition software, it may have not been extensively proofed, as such there could be errors within the text such as sound alike errors.

## 2022-10-04 ENCOUNTER — OFFICE VISIT (OUTPATIENT)
Dept: PAIN MEDICINE | Facility: CLINIC | Age: 77
End: 2022-10-04
Payer: MEDICARE

## 2022-10-04 VITALS
WEIGHT: 132.25 LBS | DIASTOLIC BLOOD PRESSURE: 72 MMHG | HEIGHT: 64 IN | RESPIRATION RATE: 7 BRPM | SYSTOLIC BLOOD PRESSURE: 146 MMHG | BODY MASS INDEX: 22.58 KG/M2 | HEART RATE: 68 BPM

## 2022-10-04 DIAGNOSIS — M47.816 LUMBAR FACET ARTHROPATHY: ICD-10-CM

## 2022-10-04 DIAGNOSIS — M51.36 DDD (DEGENERATIVE DISC DISEASE), LUMBAR: Primary | ICD-10-CM

## 2022-10-04 DIAGNOSIS — M47.816 LUMBAR SPONDYLOSIS: ICD-10-CM

## 2022-10-04 DIAGNOSIS — M54.16 LUMBAR RADICULOPATHY, CHRONIC: ICD-10-CM

## 2022-10-04 DIAGNOSIS — B02.29 POSTHERPETIC NEURALGIA: ICD-10-CM

## 2022-10-04 PROCEDURE — 3288F PR FALLS RISK ASSESSMENT DOCUMENTED: ICD-10-PCS | Mod: CPTII,S$GLB,, | Performed by: ANESTHESIOLOGY

## 2022-10-04 PROCEDURE — 1160F RVW MEDS BY RX/DR IN RCRD: CPT | Mod: CPTII,S$GLB,, | Performed by: ANESTHESIOLOGY

## 2022-10-04 PROCEDURE — 99214 OFFICE O/P EST MOD 30 MIN: CPT | Mod: S$GLB,,, | Performed by: ANESTHESIOLOGY

## 2022-10-04 PROCEDURE — 3078F PR MOST RECENT DIASTOLIC BLOOD PRESSURE < 80 MM HG: ICD-10-PCS | Mod: CPTII,S$GLB,, | Performed by: ANESTHESIOLOGY

## 2022-10-04 PROCEDURE — 1101F PR PT FALLS ASSESS DOC 0-1 FALLS W/OUT INJ PAST YR: ICD-10-PCS | Mod: CPTII,S$GLB,, | Performed by: ANESTHESIOLOGY

## 2022-10-04 PROCEDURE — 99214 PR OFFICE/OUTPT VISIT, EST, LEVL IV, 30-39 MIN: ICD-10-PCS | Mod: S$GLB,,, | Performed by: ANESTHESIOLOGY

## 2022-10-04 PROCEDURE — 3077F SYST BP >= 140 MM HG: CPT | Mod: CPTII,S$GLB,, | Performed by: ANESTHESIOLOGY

## 2022-10-04 PROCEDURE — 3077F PR MOST RECENT SYSTOLIC BLOOD PRESSURE >= 140 MM HG: ICD-10-PCS | Mod: CPTII,S$GLB,, | Performed by: ANESTHESIOLOGY

## 2022-10-04 PROCEDURE — 3072F LOW RISK FOR RETINOPATHY: CPT | Mod: CPTII,S$GLB,, | Performed by: ANESTHESIOLOGY

## 2022-10-04 PROCEDURE — 1125F PR PAIN SEVERITY QUANTIFIED, PAIN PRESENT: ICD-10-PCS | Mod: CPTII,S$GLB,, | Performed by: ANESTHESIOLOGY

## 2022-10-04 PROCEDURE — 3078F DIAST BP <80 MM HG: CPT | Mod: CPTII,S$GLB,, | Performed by: ANESTHESIOLOGY

## 2022-10-04 PROCEDURE — 1159F PR MEDICATION LIST DOCUMENTED IN MEDICAL RECORD: ICD-10-PCS | Mod: CPTII,S$GLB,, | Performed by: ANESTHESIOLOGY

## 2022-10-04 PROCEDURE — 1125F AMNT PAIN NOTED PAIN PRSNT: CPT | Mod: CPTII,S$GLB,, | Performed by: ANESTHESIOLOGY

## 2022-10-04 PROCEDURE — 1160F PR REVIEW ALL MEDS BY PRESCRIBER/CLIN PHARMACIST DOCUMENTED: ICD-10-PCS | Mod: CPTII,S$GLB,, | Performed by: ANESTHESIOLOGY

## 2022-10-04 PROCEDURE — 1159F MED LIST DOCD IN RCRD: CPT | Mod: CPTII,S$GLB,, | Performed by: ANESTHESIOLOGY

## 2022-10-04 PROCEDURE — 99999 PR PBB SHADOW E&M-EST. PATIENT-LVL IV: CPT | Mod: PBBFAC,,, | Performed by: ANESTHESIOLOGY

## 2022-10-04 PROCEDURE — 3072F PR LOW RISK FOR RETINOPATHY: ICD-10-PCS | Mod: CPTII,S$GLB,, | Performed by: ANESTHESIOLOGY

## 2022-10-04 PROCEDURE — 99999 PR PBB SHADOW E&M-EST. PATIENT-LVL IV: ICD-10-PCS | Mod: PBBFAC,,, | Performed by: ANESTHESIOLOGY

## 2022-10-04 PROCEDURE — 3288F FALL RISK ASSESSMENT DOCD: CPT | Mod: CPTII,S$GLB,, | Performed by: ANESTHESIOLOGY

## 2022-10-04 PROCEDURE — 1101F PT FALLS ASSESS-DOCD LE1/YR: CPT | Mod: CPTII,S$GLB,, | Performed by: ANESTHESIOLOGY

## 2022-10-04 RX ORDER — NORTRIPTYLINE HYDROCHLORIDE 25 MG/1
25 CAPSULE ORAL NIGHTLY
Qty: 90 CAPSULE | Refills: 0 | Status: SHIPPED | OUTPATIENT
Start: 2022-10-04 | End: 2023-09-15

## 2022-10-06 ENCOUNTER — TELEPHONE (OUTPATIENT)
Dept: PAIN MEDICINE | Facility: CLINIC | Age: 77
End: 2022-10-06
Payer: MEDICARE

## 2022-10-26 ENCOUNTER — CLINICAL SUPPORT (OUTPATIENT)
Dept: PULMONOLOGY | Facility: CLINIC | Age: 77
End: 2022-10-26
Payer: MEDICARE

## 2022-10-26 ENCOUNTER — OFFICE VISIT (OUTPATIENT)
Dept: PULMONOLOGY | Facility: CLINIC | Age: 77
End: 2022-10-26
Payer: MEDICARE

## 2022-10-26 ENCOUNTER — LAB VISIT (OUTPATIENT)
Dept: LAB | Facility: HOSPITAL | Age: 77
End: 2022-10-26
Attending: PEDIATRICS
Payer: MEDICARE

## 2022-10-26 VITALS
HEART RATE: 80 BPM | OXYGEN SATURATION: 94 % | RESPIRATION RATE: 19 BRPM | WEIGHT: 134.5 LBS | SYSTOLIC BLOOD PRESSURE: 130 MMHG | DIASTOLIC BLOOD PRESSURE: 84 MMHG | HEIGHT: 64 IN | BODY MASS INDEX: 22.96 KG/M2

## 2022-10-26 DIAGNOSIS — J41.1 MUCOPURULENT CHRONIC BRONCHITIS: Chronic | ICD-10-CM

## 2022-10-26 DIAGNOSIS — E11.22 CONTROLLED TYPE 2 DIABETES MELLITUS WITH STAGE 3 CHRONIC KIDNEY DISEASE, WITHOUT LONG-TERM CURRENT USE OF INSULIN: ICD-10-CM

## 2022-10-26 DIAGNOSIS — J84.9 INTERSTITIAL PULMONARY DISEASE, UNSPECIFIED: ICD-10-CM

## 2022-10-26 DIAGNOSIS — R91.1 SOLITARY PULMONARY NODULE: Primary | ICD-10-CM

## 2022-10-26 DIAGNOSIS — R06.02 SOB (SHORTNESS OF BREATH) ON EXERTION: ICD-10-CM

## 2022-10-26 DIAGNOSIS — J45.909 ASTHMA, UNSPECIFIED ASTHMA SEVERITY, UNSPECIFIED WHETHER COMPLICATED, UNSPECIFIED WHETHER PERSISTENT: ICD-10-CM

## 2022-10-26 DIAGNOSIS — J45.40 MODERATE PERSISTENT ASTHMA WITHOUT COMPLICATION: ICD-10-CM

## 2022-10-26 DIAGNOSIS — N18.30 CONTROLLED TYPE 2 DIABETES MELLITUS WITH STAGE 3 CHRONIC KIDNEY DISEASE, WITHOUT LONG-TERM CURRENT USE OF INSULIN: ICD-10-CM

## 2022-10-26 DIAGNOSIS — J84.112 UIP (USUAL INTERSTITIAL PNEUMONITIS): ICD-10-CM

## 2022-10-26 LAB
ALBUMIN SERPL BCP-MCNC: 3.5 G/DL (ref 3.5–5.2)
ALP SERPL-CCNC: 51 U/L (ref 55–135)
ALT SERPL W/O P-5'-P-CCNC: 24 U/L (ref 10–44)
ANION GAP SERPL CALC-SCNC: 10 MMOL/L (ref 8–16)
AST SERPL-CCNC: 33 U/L (ref 10–40)
BILIRUB SERPL-MCNC: 0.3 MG/DL (ref 0.1–1)
BUN SERPL-MCNC: 19 MG/DL (ref 8–23)
CALCIUM SERPL-MCNC: 9.6 MG/DL (ref 8.7–10.5)
CHLORIDE SERPL-SCNC: 100 MMOL/L (ref 95–110)
CHOLEST SERPL-MCNC: 159 MG/DL (ref 120–199)
CHOLEST/HDLC SERPL: 2.4 {RATIO} (ref 2–5)
CO2 SERPL-SCNC: 22 MMOL/L (ref 23–29)
CREAT SERPL-MCNC: 0.8 MG/DL (ref 0.5–1.4)
EST. GFR  (NO RACE VARIABLE): >60 ML/MIN/1.73 M^2
ESTIMATED AVG GLUCOSE: 100 MG/DL (ref 68–131)
GLUCOSE SERPL-MCNC: 92 MG/DL (ref 70–110)
HBA1C MFR BLD: 5.1 % (ref 4–5.6)
HDLC SERPL-MCNC: 65 MG/DL (ref 40–75)
HDLC SERPL: 40.9 % (ref 20–50)
LDLC SERPL CALC-MCNC: 79.8 MG/DL (ref 63–159)
NONHDLC SERPL-MCNC: 94 MG/DL
POTASSIUM SERPL-SCNC: 3.4 MMOL/L (ref 3.5–5.1)
PROT SERPL-MCNC: 7.2 G/DL (ref 6–8.4)
SODIUM SERPL-SCNC: 132 MMOL/L (ref 136–145)
TRIGL SERPL-MCNC: 71 MG/DL (ref 30–150)

## 2022-10-26 PROCEDURE — 1159F MED LIST DOCD IN RCRD: CPT | Mod: CPTII,S$GLB,, | Performed by: NURSE PRACTITIONER

## 2022-10-26 PROCEDURE — 3079F DIAST BP 80-89 MM HG: CPT | Mod: CPTII,S$GLB,, | Performed by: NURSE PRACTITIONER

## 2022-10-26 PROCEDURE — 1101F PR PT FALLS ASSESS DOC 0-1 FALLS W/OUT INJ PAST YR: ICD-10-PCS | Mod: CPTII,S$GLB,, | Performed by: NURSE PRACTITIONER

## 2022-10-26 PROCEDURE — 94726 PLETHYSMOGRAPHY LUNG VOLUMES: CPT | Mod: S$GLB,,, | Performed by: INTERNAL MEDICINE

## 2022-10-26 PROCEDURE — 94010 BREATHING CAPACITY TEST: ICD-10-PCS | Mod: S$GLB,,, | Performed by: INTERNAL MEDICINE

## 2022-10-26 PROCEDURE — 3288F PR FALLS RISK ASSESSMENT DOCUMENTED: ICD-10-PCS | Mod: CPTII,S$GLB,, | Performed by: NURSE PRACTITIONER

## 2022-10-26 PROCEDURE — 99214 PR OFFICE/OUTPT VISIT, EST, LEVL IV, 30-39 MIN: ICD-10-PCS | Mod: 25,S$GLB,, | Performed by: NURSE PRACTITIONER

## 2022-10-26 PROCEDURE — 3079F PR MOST RECENT DIASTOLIC BLOOD PRESSURE 80-89 MM HG: ICD-10-PCS | Mod: CPTII,S$GLB,, | Performed by: NURSE PRACTITIONER

## 2022-10-26 PROCEDURE — 3075F SYST BP GE 130 - 139MM HG: CPT | Mod: CPTII,S$GLB,, | Performed by: NURSE PRACTITIONER

## 2022-10-26 PROCEDURE — 1159F PR MEDICATION LIST DOCUMENTED IN MEDICAL RECORD: ICD-10-PCS | Mod: CPTII,S$GLB,, | Performed by: NURSE PRACTITIONER

## 2022-10-26 PROCEDURE — 80053 COMPREHEN METABOLIC PANEL: CPT | Performed by: PEDIATRICS

## 2022-10-26 PROCEDURE — 99214 OFFICE O/P EST MOD 30 MIN: CPT | Mod: 25,S$GLB,, | Performed by: NURSE PRACTITIONER

## 2022-10-26 PROCEDURE — 1125F PR PAIN SEVERITY QUANTIFIED, PAIN PRESENT: ICD-10-PCS | Mod: CPTII,S$GLB,, | Performed by: NURSE PRACTITIONER

## 2022-10-26 PROCEDURE — 94729 PR C02/MEMBANE DIFFUSE CAPACITY: ICD-10-PCS | Mod: S$GLB,,, | Performed by: INTERNAL MEDICINE

## 2022-10-26 PROCEDURE — 3075F PR MOST RECENT SYSTOLIC BLOOD PRESS GE 130-139MM HG: ICD-10-PCS | Mod: CPTII,S$GLB,, | Performed by: NURSE PRACTITIONER

## 2022-10-26 PROCEDURE — 36415 COLL VENOUS BLD VENIPUNCTURE: CPT | Performed by: PEDIATRICS

## 2022-10-26 PROCEDURE — 3072F LOW RISK FOR RETINOPATHY: CPT | Mod: CPTII,S$GLB,, | Performed by: NURSE PRACTITIONER

## 2022-10-26 PROCEDURE — 83036 HEMOGLOBIN GLYCOSYLATED A1C: CPT | Performed by: PEDIATRICS

## 2022-10-26 PROCEDURE — 1160F RVW MEDS BY RX/DR IN RCRD: CPT | Mod: CPTII,S$GLB,, | Performed by: NURSE PRACTITIONER

## 2022-10-26 PROCEDURE — 1101F PT FALLS ASSESS-DOCD LE1/YR: CPT | Mod: CPTII,S$GLB,, | Performed by: NURSE PRACTITIONER

## 2022-10-26 PROCEDURE — 94726 PULM FUNCT TST PLETHYSMOGRAP: ICD-10-PCS | Mod: S$GLB,,, | Performed by: INTERNAL MEDICINE

## 2022-10-26 PROCEDURE — 1125F AMNT PAIN NOTED PAIN PRSNT: CPT | Mod: CPTII,S$GLB,, | Performed by: NURSE PRACTITIONER

## 2022-10-26 PROCEDURE — 94729 DIFFUSING CAPACITY: CPT | Mod: S$GLB,,, | Performed by: INTERNAL MEDICINE

## 2022-10-26 PROCEDURE — 80061 LIPID PANEL: CPT | Performed by: PEDIATRICS

## 2022-10-26 PROCEDURE — 99999 PR PBB SHADOW E&M-EST. PATIENT-LVL V: ICD-10-PCS | Mod: PBBFAC,,, | Performed by: NURSE PRACTITIONER

## 2022-10-26 PROCEDURE — 99999 PR PBB SHADOW E&M-EST. PATIENT-LVL V: CPT | Mod: PBBFAC,,, | Performed by: NURSE PRACTITIONER

## 2022-10-26 PROCEDURE — 1160F PR REVIEW ALL MEDS BY PRESCRIBER/CLIN PHARMACIST DOCUMENTED: ICD-10-PCS | Mod: CPTII,S$GLB,, | Performed by: NURSE PRACTITIONER

## 2022-10-26 PROCEDURE — 3072F PR LOW RISK FOR RETINOPATHY: ICD-10-PCS | Mod: CPTII,S$GLB,, | Performed by: NURSE PRACTITIONER

## 2022-10-26 PROCEDURE — 94010 BREATHING CAPACITY TEST: CPT | Mod: S$GLB,,, | Performed by: INTERNAL MEDICINE

## 2022-10-26 PROCEDURE — 3288F FALL RISK ASSESSMENT DOCD: CPT | Mod: CPTII,S$GLB,, | Performed by: NURSE PRACTITIONER

## 2022-10-26 NOTE — PROGRESS NOTES
Subjective:      Established patient    Patient ID: Shireen Felix is a 76 y.o. female.      Chief Complaint: Asthma      HPI     hSireen Felix is a 76 y.o. female presents for follow up on asthma with review CPFT.    10/26/2022 CPFT Normal spirometry. Normal lung volumes. Moderate reduction in diffusion capacity.     She is followed in pulmonary for chronic UIP, moderate persistent asthma with chronic bronchitis.     Patients reports stable NYHA II dyspnea     + covid 19 + 12/31/2021, 2/24/2022, July 2022.     The patient does not have currently have symptoms.     She is much improved since on Trelegy 200 mcg      Her current respiratory therapy regimen is TRELEGY 200 mcg. VENTOLIN inhaler, albuterol nebs, Daliresp 500 mcg.  She is  adherent with her regimen.      Patient questions prednisone low dose for her arthritis if daily dose to be continued to follow up with Dr. Hernandez, Rheumatology.         Asthma Control Test  In the past 4  weeks, how much of the time did your asthma keep you from getting as much done at work, school or at home?: None of the time  During the past 4 weeks, how often have you had shortness of breath?: Once or twice a week  During the past 4 weeks, how often did your asthma symptoms (wheezing, couging, shortness of breath, chest tightness or pain) wake you up at night or earlier than usual in the morning?: Not at all  During the past 4 weeks, how often have you used your rescue inhaler or nebulizer medication (such as albuterol)?: 2 or 3 times a week  How would you rate your asthma control during the past 4 weeks?: Well controlled  If your score is 19 or less, your asthma may not be under control: 21      Immunization status reviewed and up to date.       A full  review of systems, past , family  and social histories was performed except as mentioned in the note above, these are non contributory to the main issues discussed today.     Previous Report Reviewed: lab  reports, office notes and radiology reports     The following portions of the patient's history were reviewed and updated as appropriate: She  has a past medical history of Abnormal ECG (8/5/2019), Aneurysm, Anticoagulant long-term use, Arthritis, CAD in native artery (8/5/2019), COPD (chronic obstructive pulmonary disease), Diabetes mellitus, Fall (10/10/2019), Glaucoma, Hypertension, Seizures, Shingles (05/27/2017), and Stroke.  She  has a past surgical history that includes Brain surgery; Hysterectomy; Transforaminal epidural injection of steroid (Bilateral, 7/23/2019); Cardiac catheterization; Coronary angioplasty; Epidural steroid injection into lumbar spine (Bilateral, 11/12/2019); Injection of anesthetic agent around medial branch nerves innervating lumbar facet joint (Bilateral, 1/31/2020); Transforaminal epidural injection of steroid (Bilateral, 3/10/2020); Transforaminal epidural injection of steroid (Right, 6/19/2020); Injection of joint (Bilateral, 7/9/2020); and Injection of anesthetic agent into sacroiliac joint (Right, 11/16/2021).  Her family history includes No Known Problems in her father and mother.  She  reports that she quit smoking about 18 years ago. Her smoking use included cigarettes. She started smoking about 60 years ago. She has a 42.00 pack-year smoking history. She has never used smokeless tobacco. She reports that she does not drink alcohol and does not use drugs.  She has a current medication list which includes the following prescription(s): acetaminophen, albuterol, albuterol, amlodipine, aptiom, atorvastatin, cholecalciferol (vitamin d3), clopidogrel, daliresp, denosumab, diclofenac, diltiazem, doxycycline, escitalopram oxalate, aptiom, fluticasone propionate, trelegy ellipta, gabapentin, hydrochlorothiazide, ipratropium, ketoconazole, latanoprost, levocetirizine, magnesium oxide, metformin, metoprolol tartrate, montelukast, myrbetriq, nitroglycerin, nortriptyline, omeprazole,  "pantoprazole, potassium chloride sa, promethazine-dextromethorphan, ranolazine, timolol maleate 0.5%, tizanidine, triamcinolone acetonide 0.025%, valsartan, and prednisone, and the following Facility-Administered Medications: acetaminophen.  She is allergic to penicillins, adhesive, betadine [povidone-iodine], doxycycline, oxycodone, sulfa (sulfonamide antibiotics), and zanaflex [tizanidine]..    Review of Systems   Constitutional:  Negative for fever and chills.   HENT:  Positive for hearing loss. Negative for nosebleeds, rhinorrhea and congestion.    Eyes:  Negative for redness.   Respiratory:  Positive for cough, dyspnea on extertion and use of rescue inhaler. Negative for sputum production, choking and wheezing.    Genitourinary:  Negative for hematuria.   Endocrine: Diabetes melllitus Negative for cold intolerance.    Musculoskeletal:  Positive for arthralgias and back pain.   Skin:  Negative for rash.   Gastrointestinal:  Positive for acid reflux. Negative for vomiting.   Neurological:  Negative for syncope and headaches.        History of stroke  Seizure disorder.  Post herpetic neuralgia   Hematological:  Negative for adenopathy. Bleeds easily and excessive bruising.   Psychiatric/Behavioral:  Negative for confusion.       Objective:     /84   Pulse 80   Resp 19   Ht 5' 4" (1.626 m)   Wt 61 kg (134 lb 7.7 oz)   LMP  (LMP Unknown)   SpO2 (!) 94%   BMI 23.08 kg/m²   Body mass index is 23.08 kg/m².     Physical Exam  Constitutional:       Appearance: She is well-developed.   HENT:      Head: Normocephalic and atraumatic.      Right Ear: Tympanic membrane normal.      Left Ear: Tympanic membrane normal.   Eyes:      Pupils: Pupils are equal, round, and reactive to light.   Cardiovascular:      Rate and Rhythm: Normal rate and regular rhythm.      Heart sounds: Normal heart sounds. No murmur heard.  Pulmonary:      Effort: Pulmonary effort is normal. No respiratory distress.   Abdominal:      " General: Bowel sounds are normal.      Palpations: Abdomen is soft.   Musculoskeletal:         General: No tenderness. Normal range of motion.      Cervical back: Normal range of motion and neck supple.   Skin:     General: Skin is warm and dry.      Coloration: Skin is not pale.   Neurological:      Mental Status: She is alert and oriented to person, place, and time.   Psychiatric:         Behavior: Behavior normal.       Personal Diagnostic Review    10/26/2022 CPFT Normal spirometry. Normal lung volumes. Moderate reduction in diffusion capacity - unadjusted for hemoglobin. (DLCO > or equal to 40% and < 60% predicted). This interpretation of diffusing capacity does not take into account the patient's hemoglobin level (Unavailable at the time of testing - hemoglobin assumed to be normal). Flow volume loops demonstrate a restrictive defect.     X-Ray Chest PA And Lateral  Narrative: EXAMINATION:  XR CHEST PA AND LATERAL    CLINICAL HISTORY:  Moderate persistent asthma with (acute) exacerbation    TECHNIQUE:  PA and lateral views of the chest were performed.    COMPARISON:  05/16/2022    FINDINGS:  The lungs are clear and free of infiltrate.  No pleural effusion or pneumothorax. The heart is not enlarged. There is tortuosity of the descending thoracic aorta.  Impression: 1.  No acute cardiopulmonary process.    Electronically signed by: David Wallace DO  Date:    08/26/2022  Time:    10:22      Results for orders placed during the hospital encounter of 03/15/22    X-Ray Chest PA And Lateral    Narrative  EXAMINATION:  XR CHEST PA AND LATERAL    CLINICAL HISTORY:  Unspecified asthma, uncomplicated    TECHNIQUE:  PA and lateral views of the chest were performed.    COMPARISON:  08/17/2021    FINDINGS:  The cardiac and mediastinal silhouettes appear within normal limits.   Faint chronic appearing interstitial opacities seen in the periphery of the lungs bilaterally with no appreciable change from prior.  No focal  parenchymal consolidation or definite pleural effusion visualized.  No acute osseous findings demonstrated.    Impression  Unchanged appearance of the chest as above.  No acute findings      Electronically signed by: Neri Husain MD  Date:    03/15/2022  Time:    11:24          XR CHEST PA AND LATERAL: 20:      Coarsening of the bronchovascular markings again noted.  No confluent airspace disease.  No pleural effusion.  Cardiomediastinal silhouette and osseous structures are stable in appearance.    Pulmonary function tests:20 :  FEV1: 2.13 (103.5 % predicted), FVC:  2.44 (91.0 % predicted), FEV1/FVC:  87.75, T.47 ( 90.1 % predicted) RV /TLC 45 ( 102% predicted, DLCO: 14.51 (70.2 % predicted)  Normal spirometry. Lung volumes not done. Diffusion capacity is mildly reduced but corrects for alveolar volume.         CT of chest performed on 20 with contrast:       1. Negative CTA chest.  No pulmonary embolism.  2. No active infiltrate.  Interstitial lung disease with coarsening of the peripheral septal markings, most notable bibasilar distribution, with mild bibasilar honeycombing.  Stable 6 mm noncalcified pulmonary nodule right lower lobe.  3. Multitude bilateral renal cortical cysts.  4. Multilevel degenerative thoracolumbar spine.  Multiple stable thoracic disc herniations, most notably at T6-7, with moderate central protrusion resulting in spinal canal stenosis (8 mm).  Scattered Schmorl's nodes.  Stable mild compression deformity/loss of height at T12.  There is also mild compression deformity of L2, new compared with 2020.      Assessment / Plan:       Discussed diagnosis, its evaluation, treatment and usual course. All questions answered.  Requested Prescriptions      No prescriptions requested or ordered in this encounter       Problem List Items Addressed This Visit       Mucopurulent chronic bronchitis (Chronic)    UIP (usual interstitial pneumonitis)    Moderate persistent  asthma without complication    Overview     Trelegy 200 mcg triple therapy. Continue Daliresp, Albuterol inhaler, albuterol nebs. .            Other Visit Diagnoses       Solitary pulmonary nodule    -  Primary    Interstitial pulmonary disease, unspecified        Relevant Orders    CT Chest Without Contrast    SOB (shortness of breath) on exertion        Relevant Orders    Stress test, pulmonary          Continue Trelegy 200 mcg triple therapy. Continue Daliresp, Albuterol inhaler, add albuterol nebs twice daily.  CT scan chest on return reevaluation ILD, 6 mm pul nodule.     No LOS data to display  This includes face to face time and non-face to face time preparing to see the patient (eg, review of tests), obtaining and/or reviewing separately obtained history, documenting clinical information in the electronic or other health record, independently interpreting results and communicating results to the patient/family/caregiver, or care coordinator.    Follow up in about 6 months (around 4/26/2023) for Asthma/COPD review 6MWD. Pulmonary nodule w/review CT chest.

## 2022-10-27 LAB
BRPFT: NORMAL
DLCO ADJ PRE: 12.12 ML/(MIN*MMHG)
DLCO SINGLE BREATH LLN: 14.63
DLCO SINGLE BREATH PRE REF: 59.5 %
DLCO SINGLE BREATH REF: 20.37
DLCOC SBVA LLN: 2.68
DLCOC SBVA PRE REF: 97.1 %
DLCOC SBVA REF: 4.1
DLCOC SINGLE BREATH LLN: 14.63
DLCOC SINGLE BREATH PRE REF: 59.5 %
DLCOC SINGLE BREATH REF: 20.37
DLCOVA LLN: 2.68
DLCOVA PRE REF: 97.1 %
DLCOVA PRE: 3.98 ML/(MIN*MMHG*L)
DLCOVA REF: 4.1
DLVAADJ PRE: 3.98 ML/(MIN*MMHG*L)
ERV LLN: -16449.45
ERV PRE REF: 104.4 %
ERV REF: 0.55
FEF 25 75 LLN: 0.69
FEF 25 75 PRE REF: 227.5 %
FEF 25 75 REF: 1.62
FEV1 FVC LLN: 63
FEV1 FVC PRE REF: 113.6 %
FEV1 FVC REF: 77
FEV1 LLN: 1.41
FEV1 PRE REF: 110.1 %
FEV1 REF: 2
FRCPLETH LLN: 1.91
FRCPLETH PREREF: 106.6 %
FRCPLETH REF: 2.73
FVC LLN: 1.85
FVC PRE REF: 95.8 %
FVC REF: 2.62
IVC PRE: 1.95 L
IVC SINGLE BREATH LLN: 1.85
IVC SINGLE BREATH PRE REF: 74.2 %
IVC SINGLE BREATH REF: 2.62
MVV LLN: 65
MVV PRE REF: 118.9 %
MVV REF: 77
PEF LLN: 3.38
PEF PRE REF: 128.8 %
PEF REF: 5.1
PRE DLCO: 12.12 ML/(MIN*MMHG)
PRE ERV: 0.57 L
PRE FEF 25 75: 3.69 L/S
PRE FET 100: 7.3 SEC
PRE FEV1 FVC: 87.73 %
PRE FEV1: 2.2 L
PRE FRC PL: 2.91 L
PRE FVC: 2.51 L
PRE MVV: 91 L/MIN
PRE PEF: 6.57 L/S
PRE RV: 2.18 L
PRE TLC: 4.69 L
RAW LLN: 3.06
RAW PRE REF: 99.3 %
RAW PRE: 3.04 CMH2O*S/L
RAW REF: 3.06
RV LLN: 1.61
RV PRE REF: 99.8 %
RV REF: 2.18
RVTLC LLN: 36
RVTLC PRE REF: 102.9 %
RVTLC PRE: 46.46 %
RVTLC REF: 45
TLC LLN: 3.98
TLC PRE REF: 94.4 %
TLC REF: 4.97
VA PRE: 3.04 L
VA SINGLE BREATH LLN: 4.82
VA SINGLE BREATH PRE REF: 63.2 %
VA SINGLE BREATH REF: 4.82
VC LLN: 1.85
VC PRE REF: 95.8 %
VC PRE: 2.51 L
VC REF: 2.62
VTGRAWPRE: 2.8 L

## 2022-11-02 DIAGNOSIS — I25.118 CORONARY ARTERY DISEASE OF NATIVE ARTERY OF NATIVE HEART WITH STABLE ANGINA PECTORIS: ICD-10-CM

## 2022-11-02 DIAGNOSIS — J30.9 CHRONIC ALLERGIC RHINITIS: ICD-10-CM

## 2022-11-02 DIAGNOSIS — I10 ESSENTIAL HYPERTENSION: ICD-10-CM

## 2022-11-02 DIAGNOSIS — J41.1 MUCOPURULENT CHRONIC BRONCHITIS: Chronic | ICD-10-CM

## 2022-11-02 NOTE — TELEPHONE ENCOUNTER
Good Afternoon,     Pt is requesting for a refill of: tiZANidine (ZANAFLEX) 4 MG tablet  Last filed:9/13/22  Last encounter: 10/04/22   Up coming apt: n/a   [facility] [address] [facility]  Is this something you can do?      Jimi Stockton   Medical Assistant

## 2022-11-02 NOTE — TELEPHONE ENCOUNTER
----- Message from Pauline Hsieh sent at 11/2/2022 11:33 AM CDT -----  Please return a call to patients brother Rm Ojeda [phone].Thanks

## 2022-11-03 RX ORDER — ALBUTEROL SULFATE 90 UG/1
AEROSOL, METERED RESPIRATORY (INHALATION)
Qty: 18 G | Refills: 11 | Status: SHIPPED | OUTPATIENT
Start: 2022-11-03

## 2022-11-03 RX ORDER — METFORMIN HYDROCHLORIDE 500 MG/1
TABLET ORAL
Qty: 90 TABLET | Refills: 3 | Status: ON HOLD | OUTPATIENT
Start: 2022-11-03 | End: 2023-08-16 | Stop reason: HOSPADM

## 2022-11-03 RX ORDER — TIZANIDINE 4 MG/1
TABLET ORAL
Qty: 60 TABLET | Refills: 0 | Status: SHIPPED | OUTPATIENT
Start: 2022-11-03 | End: 2022-11-04 | Stop reason: SDUPTHER

## 2022-11-03 RX ORDER — MONTELUKAST SODIUM 10 MG/1
TABLET ORAL
Qty: 90 TABLET | Refills: 3 | Status: SHIPPED | OUTPATIENT
Start: 2022-11-03 | End: 2024-02-01 | Stop reason: SDUPTHER

## 2022-11-03 RX ORDER — AMLODIPINE BESYLATE 10 MG/1
TABLET ORAL
Qty: 90 TABLET | Refills: 3 | Status: ON HOLD | OUTPATIENT
Start: 2022-11-03 | End: 2023-08-16 | Stop reason: HOSPADM

## 2022-11-03 RX ORDER — POTASSIUM CHLORIDE 20 MEQ/1
20 TABLET, EXTENDED RELEASE ORAL DAILY
Qty: 90 TABLET | Refills: 1 | Status: SHIPPED | OUTPATIENT
Start: 2022-11-03 | End: 2023-05-23 | Stop reason: SDUPTHER

## 2022-11-03 RX ORDER — ATORVASTATIN CALCIUM 40 MG/1
TABLET, FILM COATED ORAL
Qty: 90 TABLET | Refills: 1 | Status: SHIPPED | OUTPATIENT
Start: 2022-11-03 | End: 2023-05-02

## 2022-11-04 ENCOUNTER — TELEPHONE (OUTPATIENT)
Dept: PAIN MEDICINE | Facility: CLINIC | Age: 77
End: 2022-11-04
Payer: MEDICARE

## 2022-11-04 DIAGNOSIS — M79.18 MYOFASCIAL MUSCLE PAIN: Primary | ICD-10-CM

## 2022-11-04 RX ORDER — TIZANIDINE 4 MG/1
TABLET ORAL
Qty: 60 TABLET | Refills: 0 | Status: SHIPPED | OUTPATIENT
Start: 2022-11-04 | End: 2022-12-16 | Stop reason: SDUPTHER

## 2022-11-04 RX ORDER — TIZANIDINE 4 MG/1
TABLET ORAL
Qty: 60 TABLET | Refills: 0 | Status: SHIPPED | OUTPATIENT
Start: 2022-11-04 | End: 2022-11-04 | Stop reason: CLARIF

## 2022-11-04 NOTE — TELEPHONE ENCOUNTER
----- Message from Tressa Rodriguez sent at 11/4/2022  1:14 PM CDT -----  Contact: Rm/Brother  Rm is calling to speak to someone regarding the medication that was suppose to be called in for the patient, he states it still not at the pharmacy after speaking to the nurse. Please give him a call back at     Thanks  LJ

## 2022-11-04 NOTE — TELEPHONE ENCOUNTER
Called patient's brother to informed them that the prescription has been re-sent to the pharmacy and that they can pick it up this afternoon.  He verbalized understanding.

## 2022-11-10 ENCOUNTER — TELEPHONE (OUTPATIENT)
Dept: RHEUMATOLOGY | Facility: CLINIC | Age: 77
End: 2022-11-10
Payer: MEDICARE

## 2022-11-10 NOTE — TELEPHONE ENCOUNTER
"Returned phone call. Appointments rescheduled to next Thursday 11/17/2022 at 11:00 at our [place] location. All questions answered.      Josiane Smallwood (Allye), [facility]  Rheumatology Department    "

## 2022-11-14 DIAGNOSIS — I10 ESSENTIAL HYPERTENSION: ICD-10-CM

## 2022-11-14 RX ORDER — METOPROLOL TARTRATE 100 MG/1
100 TABLET ORAL 2 TIMES DAILY
Qty: 180 TABLET | Refills: 1 | Status: SHIPPED | OUTPATIENT
Start: 2022-11-14 | End: 2023-06-12 | Stop reason: SDUPTHER

## 2022-11-14 NOTE — TELEPHONE ENCOUNTER
----- Message from Neymar House sent at 11/14/2022  1:56 PM CST -----  Contact: Self  ..Type:  RX Refill Request    Who Called: .Shireen Felix  Refill or New Rx: Refill   RX Name and Strength: metoprolol tartrate (LOPRESSOR) 100 MG tablet  How is the patient currently taking it? (ex. 1XDay): 2xday   Is this a 30 day or 90 day RX:90  Preferred Pharmacy with phone number:[facility]  [place], [place] - [address] [facility]  [address] [facility] [address]  Phone: [phone] Fax: [phone]  Local or Mail Order:  local   Ordering Provider: Dr. Dye   Would the patient rather a call back or a response via [facility]?  Call back   Best Call Back Number:.[phone] (home)   Additional Information: pt states she is out of this medication drug store gave her enough for the weekend

## 2022-11-17 ENCOUNTER — INFUSION (OUTPATIENT)
Dept: INFUSION THERAPY | Facility: HOSPITAL | Age: 77
End: 2022-11-17
Attending: INTERNAL MEDICINE
Payer: MEDICARE

## 2022-11-17 ENCOUNTER — TELEPHONE (OUTPATIENT)
Dept: PAIN MEDICINE | Facility: CLINIC | Age: 77
End: 2022-11-17
Payer: MEDICARE

## 2022-11-17 ENCOUNTER — OFFICE VISIT (OUTPATIENT)
Dept: RHEUMATOLOGY | Facility: CLINIC | Age: 77
End: 2022-11-17
Payer: MEDICARE

## 2022-11-17 VITALS
TEMPERATURE: 98 F | OXYGEN SATURATION: 95 % | RESPIRATION RATE: 20 BRPM | SYSTOLIC BLOOD PRESSURE: 153 MMHG | HEART RATE: 72 BPM | DIASTOLIC BLOOD PRESSURE: 69 MMHG

## 2022-11-17 VITALS
WEIGHT: 130.94 LBS | SYSTOLIC BLOOD PRESSURE: 153 MMHG | DIASTOLIC BLOOD PRESSURE: 69 MMHG | HEART RATE: 72 BPM | HEIGHT: 64 IN | BODY MASS INDEX: 22.35 KG/M2

## 2022-11-17 DIAGNOSIS — M81.0 AGE-RELATED OSTEOPOROSIS WITHOUT CURRENT PATHOLOGICAL FRACTURE: Primary | ICD-10-CM

## 2022-11-17 DIAGNOSIS — M15.9 PRIMARY OSTEOARTHRITIS INVOLVING MULTIPLE JOINTS: ICD-10-CM

## 2022-11-17 DIAGNOSIS — M25.511 CHRONIC RIGHT SHOULDER PAIN: ICD-10-CM

## 2022-11-17 DIAGNOSIS — G89.29 CHRONIC RIGHT SHOULDER PAIN: ICD-10-CM

## 2022-11-17 PROCEDURE — 3078F DIAST BP <80 MM HG: CPT | Mod: CPTII,S$GLB,, | Performed by: INTERNAL MEDICINE

## 2022-11-17 PROCEDURE — 20610 LARGE JOINT ASPIRATION/INJECTION: R SUBACROMIAL BURSA: ICD-10-PCS | Mod: RT,S$GLB,, | Performed by: INTERNAL MEDICINE

## 2022-11-17 PROCEDURE — 3288F PR FALLS RISK ASSESSMENT DOCUMENTED: ICD-10-PCS | Mod: CPTII,S$GLB,, | Performed by: INTERNAL MEDICINE

## 2022-11-17 PROCEDURE — 1125F AMNT PAIN NOTED PAIN PRSNT: CPT | Mod: CPTII,S$GLB,, | Performed by: INTERNAL MEDICINE

## 2022-11-17 PROCEDURE — 99999 PR PBB SHADOW E&M-EST. PATIENT-LVL V: ICD-10-PCS | Mod: PBBFAC,,, | Performed by: INTERNAL MEDICINE

## 2022-11-17 PROCEDURE — 3072F PR LOW RISK FOR RETINOPATHY: ICD-10-PCS | Mod: CPTII,S$GLB,, | Performed by: INTERNAL MEDICINE

## 2022-11-17 PROCEDURE — 1125F PR PAIN SEVERITY QUANTIFIED, PAIN PRESENT: ICD-10-PCS | Mod: CPTII,S$GLB,, | Performed by: INTERNAL MEDICINE

## 2022-11-17 PROCEDURE — 3077F SYST BP >= 140 MM HG: CPT | Mod: CPTII,S$GLB,, | Performed by: INTERNAL MEDICINE

## 2022-11-17 PROCEDURE — 1159F PR MEDICATION LIST DOCUMENTED IN MEDICAL RECORD: ICD-10-PCS | Mod: CPTII,S$GLB,, | Performed by: INTERNAL MEDICINE

## 2022-11-17 PROCEDURE — 1101F PR PT FALLS ASSESS DOC 0-1 FALLS W/OUT INJ PAST YR: ICD-10-PCS | Mod: CPTII,S$GLB,, | Performed by: INTERNAL MEDICINE

## 2022-11-17 PROCEDURE — 96372 THER/PROPH/DIAG INJ SC/IM: CPT

## 2022-11-17 PROCEDURE — 1101F PT FALLS ASSESS-DOCD LE1/YR: CPT | Mod: CPTII,S$GLB,, | Performed by: INTERNAL MEDICINE

## 2022-11-17 PROCEDURE — 99214 PR OFFICE/OUTPT VISIT, EST, LEVL IV, 30-39 MIN: ICD-10-PCS | Mod: 25,S$GLB,, | Performed by: INTERNAL MEDICINE

## 2022-11-17 PROCEDURE — 3072F LOW RISK FOR RETINOPATHY: CPT | Mod: CPTII,S$GLB,, | Performed by: INTERNAL MEDICINE

## 2022-11-17 PROCEDURE — 99214 OFFICE O/P EST MOD 30 MIN: CPT | Mod: 25,S$GLB,, | Performed by: INTERNAL MEDICINE

## 2022-11-17 PROCEDURE — 1159F MED LIST DOCD IN RCRD: CPT | Mod: CPTII,S$GLB,, | Performed by: INTERNAL MEDICINE

## 2022-11-17 PROCEDURE — 63600175 PHARM REV CODE 636 W HCPCS: Mod: JG | Performed by: INTERNAL MEDICINE

## 2022-11-17 PROCEDURE — 3078F PR MOST RECENT DIASTOLIC BLOOD PRESSURE < 80 MM HG: ICD-10-PCS | Mod: CPTII,S$GLB,, | Performed by: INTERNAL MEDICINE

## 2022-11-17 PROCEDURE — 20610 DRAIN/INJ JOINT/BURSA W/O US: CPT | Mod: RT,S$GLB,, | Performed by: INTERNAL MEDICINE

## 2022-11-17 PROCEDURE — 3077F PR MOST RECENT SYSTOLIC BLOOD PRESSURE >= 140 MM HG: ICD-10-PCS | Mod: CPTII,S$GLB,, | Performed by: INTERNAL MEDICINE

## 2022-11-17 PROCEDURE — 99999 PR PBB SHADOW E&M-EST. PATIENT-LVL V: CPT | Mod: PBBFAC,,, | Performed by: INTERNAL MEDICINE

## 2022-11-17 PROCEDURE — 3288F FALL RISK ASSESSMENT DOCD: CPT | Mod: CPTII,S$GLB,, | Performed by: INTERNAL MEDICINE

## 2022-11-17 RX ORDER — TRIAMCINOLONE ACETONIDE 40 MG/ML
40 INJECTION, SUSPENSION INTRA-ARTICULAR; INTRAMUSCULAR
Status: DISCONTINUED | OUTPATIENT
Start: 2022-11-17 | End: 2022-11-17 | Stop reason: HOSPADM

## 2022-11-17 RX ADMIN — TRIAMCINOLONE ACETONIDE 40 MG: 40 INJECTION, SUSPENSION INTRA-ARTICULAR; INTRAMUSCULAR at 11:11

## 2022-11-17 RX ADMIN — DENOSUMAB 60 MG: 60 INJECTION SUBCUTANEOUS at 10:11

## 2022-11-17 NOTE — TELEPHONE ENCOUNTER
Called pt to set up their procedure. Pt answered and procedure has been made. Inform pt on the procedure instruction. Pt  does take any blood thinners. Pt understood and all question answered.     Anticoagulation:  Yes, Plavix  Per ASA guidelines for secondary prophylaxis, patient can continue Plavix for lumbar medial branch block but will need to discontinue 5 days prior to lumbar transforaminal epidural steroid injection.  Will obtain cardiac clearance from Dr. Felix.    Hold plavix 5 days          Jimi Stockton   Medical Assistant

## 2022-11-17 NOTE — TELEPHONE ENCOUNTER
----- Message from Blayne Delgadillo sent at 11/16/2022  4:29 PM CST -----  Contact: Rm Abdalla is calling in regards to getting the patient injection reschedule . Please call him back at [phone]        Thanks  CF

## 2022-11-17 NOTE — PROCEDURES
Large Joint Aspiration/Injection: R subacromial bursa    Date/Time: 11/17/2022 11:00 AM  Performed by: Julien Hernandez MD  Authorized by: Julien Hernandez MD     Consent Done?:  Yes (Verbal)  Indications:  Pain  Site marked: the procedure site was marked    Timeout: prior to procedure the correct patient, procedure, and site was verified    Prep: patient was prepped and draped in usual sterile fashion    Local anesthetic:  Lidocaine 2% without epinephrine    Details:  Needle Size:  25 G  Ultrasonic Guidance for needle placement?: No    Location:  Shoulder  Site:  R subacromial bursa  Medications:  40 mg triamcinolone acetonide 40 mg/mL  Patient tolerance:  Patient tolerated the procedure well with no immediate complications

## 2022-11-17 NOTE — PROGRESS NOTES
RHEUMATOLOGY OUTPATIENT CLINIC NOTE    11/17/2022    Attending Rheumatologist: Julien Hernandez  Primary Care Provider/Physician Requesting Consultation: POOL Chaudhari Jr, MD   Chief Complaint/Reason For Consultation:  Osteoporosis      Subjective:     Shireen Felix is a 76 y.o. White female with osteoporosis for follow up.    Main concern of chonric neck and Rt shoulder pain.  Denies falls or fractures since last visit.    Review of Systems   Constitutional:  Negative for fever.   Eyes:  Negative for photophobia and pain.   Respiratory:  Negative for shortness of breath.    Gastrointestinal:  Negative for blood in stool and melena.   Genitourinary:  Negative for hematuria.   Musculoskeletal:  Positive for joint pain. Negative for falls.   Skin:  Negative for rash.   Neurological:  Negative for focal weakness.     Chronic comorbid conditions affecting medical decision making today:  Past Medical History:   Diagnosis Date    Abnormal ECG 8/5/2019    Aneurysm     Anticoagulant long-term use     Arthritis     CAD in native artery 8/5/2019    COPD (chronic obstructive pulmonary disease)     Diabetes mellitus     Fall 10/10/2019    Formatting of this note might be different from the original. Found sitting on floor next to bed last night Mild confusion today Does not recall falling or how she ended up on floor UA today Labs yesterday unremarkable    Glaucoma     Hypertension     Seizures     Shingles 05/27/2017    Stroke      Past Surgical History:   Procedure Laterality Date    BRAIN SURGERY      CARDIAC CATHETERIZATION      CORONARY ANGIOPLASTY      EPIDURAL STEROID INJECTION INTO LUMBAR SPINE Bilateral 11/12/2019    Procedure: TF XANDER L4/5;  Surgeon: Desean Dias MD;  Location: [facility];  Service: Pain Management;  Laterality: Bilateral;    HYSTERECTOMY      INJECTION OF ANESTHETIC AGENT AROUND MEDIAL BRANCH NERVES INNERVATING LUMBAR FACET JOINT Bilateral 1/31/2020    Procedure: Bilateral L3-5 MBB;   Surgeon: Desean Dias MD;  Location: [facility];  Service: Pain Management;  Laterality: Bilateral;    INJECTION OF ANESTHETIC AGENT INTO SACROILIAC JOINT Right 2021    Procedure: Right BLOCK, SACROILIAC JOINT Right GTB with RN IV sedation;  Surgeon: Yao Fulton MD;  Location: [facility];  Service: Pain Management;  Laterality: Right;    INJECTION OF JOINT Bilateral 2020    Procedure: Bilateral shoulder GH Joint injection with local;  Surgeon: Desean Dias MD;  Location: [facility];  Service: Pain Management;  Laterality: Bilateral;    TRANSFORAMINAL EPIDURAL INJECTION OF STEROID Bilateral 2019    Procedure: Bilateral L3/4 Transforaminal Epidural Steroid Injection;  Surgeon: Desean Dias MD;  Location: [facility];  Service: Pain Management;  Laterality: Bilateral;    TRANSFORAMINAL EPIDURAL INJECTION OF STEROID Bilateral 3/10/2020    Procedure: Right T12/L1 TF XANDER with local;  Surgeon: Desean Dias MD;  Location: [facility];  Service: Pain Management;  Laterality: Bilateral;    TRANSFORAMINAL EPIDURAL INJECTION OF STEROID Right 2020    Procedure: Right T12/L1 TFESI Covid day of procedure;  Surgeon: Desean Dias MD;  Location: [facility];  Service: Pain Management;  Laterality: Right;     Family History   Problem Relation Age of Onset    No Known Problems Mother     No Known Problems Father      Social History     Tobacco Use   Smoking Status Former    Packs/day: 1.00    Years: 42.00    Pack years: 42.00    Types: Cigarettes    Start date:     Quit date: 2004    Years since quittin.6   Smokeless Tobacco Never       Current Outpatient Medications:     acetaminophen (TYLENOL) 500 MG tablet, Take 500 mg by mouth as needed for Pain., Disp: , Rfl:     albuterol (PROVENTIL) 2.5 mg /3 mL (0.083 %) nebulizer solution, Take 3 mLs (2.5 mg total) by nebulization every 4 (four) hours as needed. Rescue, Disp: 150 mL, Rfl: 11    albuterol (PROVENTIL/VENTOLIN  HFA) 90 mcg/actuation inhaler, INHALE TWO PUFFS INTO THE LUNGS EVERY 4 HOURS AS NEEDED, Disp: 18 g, Rfl: 11    amLODIPine (NORVASC) 10 MG tablet, TAKE 1 TABLET BY MOUTH ONCE A DAY, Disp: 90 tablet, Rfl: 3    APTIOM 800 mg Tab, TAKE 1 TABLET BY MOUTH EVERY EVENING WITH 400 MG TABLET, Disp: 30 tablet, Rfl: 5    atorvastatin (LIPITOR) 40 MG tablet, TAKE 1 TABLET BY MOUTH ONCE DAILY, Disp: 90 tablet, Rfl: 1    cholecalciferol, vitamin D3, 1,250 mcg (50,000 unit) capsule, Take 1 capsule (50,000 Units total) by mouth every 7 days., Disp: 12 capsule, Rfl: 0    clopidogreL (PLAVIX) 75 mg tablet, TAKE 1 TABLET BY MOUTH ONCE DAILY, Disp: 90 tablet, Rfl: 3    DALIRESP 500 mcg Tab, TAKE 1 TABLET BY MOUTH ONCE DAILY, Disp: 90 tablet, Rfl: 3    denosumab (PROLIA) 60 mg/mL Syrg, every 6 (six) months., Disp: , Rfl:     diltiaZEM (DILACOR XR) 120 MG CDCR, Take 1 capsule (120 mg total) by mouth once daily., Disp: 30 capsule, Rfl: 3    doxycycline (MONODOX) 100 MG capsule, Take 100 mg by mouth 2 (two) times daily., Disp: , Rfl:     EScitalopram oxalate (LEXAPRO) 20 MG tablet, TAKE 1 TABLET BY MOUTH once a day, Disp: 90 tablet, Rfl: 1    eslicarbazepine (APTIOM) 400 mg Tab tablet, TAKE 1 TABLET BY MOUTH EVERY EVENING WITH 800 MG TABLET, Disp: 30 tablet, Rfl: 5    fluticasone propionate (FLONASE) 50 mcg/actuation nasal spray, 2 sprays (100 mcg total) by Each Nostril route once daily at 6am., Disp: 16 g, Rfl: 11    fluticasone-umeclidin-vilanter (TRELEGY ELLIPTA) 200-62.5-25 mcg inhaler, Inhale 1 puff into the lungs once daily., Disp: 60 each, Rfl: 11    gabapentin (NEURONTIN) 100 MG capsule, Take 100 mg by mouth 2 (two) times daily as needed., Disp: , Rfl:     hydroCHLOROthiazide (HYDRODIURIL) 25 MG tablet, TAKE 1 TABLET BY MOUTH ONCE DAILY AS NEEDED, Disp: 90 tablet, Rfl: 3    ipratropium (ATROVENT) 42 mcg (0.06 %) nasal spray, 2 sprays by Nasal route 4 (four) times daily., Disp: 15 mL, Rfl: 11    ketoconazole (NIZORAL) 2 % cream,  Apply topically 2 (two) times daily. For dry flaky patches of ear., Disp: 30 g, Rfl: 1    latanoprost 0.005 % ophthalmic solution, Place 1 drop into both eyes every evening., Disp: 2.5 mL, Rfl: 12    levocetirizine (XYZAL) 5 MG tablet, Take 1 tablet (5 mg total) by mouth every evening., Disp: 30 tablet, Rfl: 11    magnesium oxide (MAG-OX) 400 mg (241.3 mg magnesium) tablet, Take 1 tablet (400 mg total) by mouth once daily., Disp: 90 tablet, Rfl: 1    metFORMIN (GLUCOPHAGE) 500 MG tablet, TAKE 1 TABLET BY MOUTH ONCE DAILY, Disp: 90 tablet, Rfl: 3    metoprolol tartrate (LOPRESSOR) 100 MG tablet, Take 1 tablet (100 mg total) by mouth 2 (two) times daily., Disp: 180 tablet, Rfl: 1    montelukast (SINGULAIR) 10 mg tablet, TAKE 1 TABLET BY MOUTH ONCE A DAY, Disp: 90 tablet, Rfl: 3    MYRBETRIQ 50 mg Tb24, TAKE 1 TABLET BY MOUTH ONCE DAILY, Disp: 30 tablet, Rfl: 10    nitroGLYCERIN (NITROSTAT) 0.4 MG SL tablet, DISSOLVE 1 TABLET UNDER TONGUE EVERY 5 MINUTES FOR CHEST PAIN (MAY TAKE UP TO 3 DOSES THEN SEEK MEDICAL ATTENTION), Disp: 25 tablet, Rfl: 0    nortriptyline (PAMELOR) 25 MG capsule, Take 1 capsule (25 mg total) by mouth every evening., Disp: 90 capsule, Rfl: 0    omeprazole (PRILOSEC) 40 MG capsule, Take 40 mg by mouth every morning., Disp: , Rfl:     pantoprazole (PROTONIX) 40 MG tablet, Take 1 tablet (40 mg total) by mouth 2 (two) times daily., Disp: 60 tablet, Rfl: 11    potassium chloride SA (K-DUR,KLOR-CON) 20 MEQ tablet, Take 1 tablet (20 mEq total) by mouth once daily., Disp: 90 tablet, Rfl: 1    promethazine-dextromethorphan (PROMETHAZINE-DM) 6.25-15 mg/5 mL Syrp, Take by mouth., Disp: , Rfl:     ranolazine (RANEXA) 1,000 mg Tb12, Take 1 tablet (1,000 mg total) by mouth 2 (two) times daily., Disp: 180 tablet, Rfl: 3    timolol maleate 0.5% (TIMOPTIC) 0.5 % Drop, Place 1 drop into both eyes every morning., Disp: 5 mL, Rfl: 12    tiZANidine (ZANAFLEX) 4 MG tablet, TAKE 1 TABLET BY MOUTH TWICE DAILY AS NEEDED  Strength: 4 mg, Disp: 60 tablet, Rfl: 0    triamcinolone acetonide 0.025% (KENALOG) 0.025 % cream, Apply topically 2 (two) times daily. PRN rash and itching of ear. Mild steroid cream., Disp: 30 g, Rfl: 0    valsartan (DIOVAN) 320 MG tablet, Take 1 tablet (320 mg total) by mouth once daily., Disp: 30 tablet, Rfl: 3    diclofenac (VOLTAREN) 25 MG TbEC, TAKE 1 TABLET BY MOUTH TWICE DAILY (Patient not taking: Reported on 11/17/2022), Disp: 60 tablet, Rfl: 11    predniSONE (DELTASONE) 20 MG tablet, TAKE 3 TABLETS DAILY FOR 3 DAYS then 2 TABLETS DAILY FOR 3 DAYS then 1 TABLET DAILY FOR 3 DAYS then 1/2 TABLET DAILY FOR 4 DAYS, Disp: 20 tablet, Rfl: 0    Current Facility-Administered Medications:     acetaminophen tablet 650 mg, 650 mg, Oral, Once PRN, Darwin Perkins MD     Objective:     Vitals:    11/17/22 0948   BP: (!) 153/69   Pulse: 72     Physical Exam   Eyes: Conjunctivae are normal.   Pulmonary/Chest: Effort normal. No respiratory distress.   Musculoskeletal:         General: No swelling or tenderness. Normal range of motion.   Neurological: She displays no weakness.   Skin: No rash noted.     Reviewed available old and all outside pertinent medical records available.    All lab results personally reviewed and interpreted by me.       ASSESSMENT:     Osteoporosis with past fragility fractures    Ostearthritis    Chronic shoulder pain    Fibromyalgia    PLAN:     Chronic therapy with Prolia.  Recommend repeating densitometry test to reassess fragility fracture risk.  Continue with Prolia every 6 months with CMP within 2 weeks receive medication to ensure no hypocalcemia. Continue adequate Ca/Vit D dietary intake, may continue with supplementation.    Chronic shoulder pain with mechanical pattern.  Features of erosive osteoarthritis.  Rheumatic workup unrevealing.  Imaging without marginal erosive changes or ankylosis.  Will provide local corticosteroid injection right shoulder for symptomatic  relief.    Disclaimer: This note is prepared using voice recognition software and as such is likely to have errors and has not been proof read. Please contact me for questions.     Large Joint Aspiration/Injection: R subacromial bursa    Date/Time: 11/17/2022 11:00 AM  Performed by: Julien Hernandez MD  Authorized by: Julien Hernandez MD     Consent Done?:  Yes (Verbal)  Indications:  Pain  Site marked: the procedure site was marked    Timeout: prior to procedure the correct patient, procedure, and site was verified    Prep: patient was prepped and draped in usual sterile fashion    Local anesthetic:  Lidocaine 2% without epinephrine    Details:  Needle Size:  25 G  Ultrasonic Guidance for needle placement?: No    Location:  Shoulder  Site:  R subacromial bursa  Medications:  40 mg triamcinolone acetonide 40 mg/mL  Patient tolerance:  Patient tolerated the procedure well with no immediate complications        Julien Hernandez M.D.

## 2022-11-29 ENCOUNTER — TELEPHONE (OUTPATIENT)
Dept: INTERNAL MEDICINE | Facility: CLINIC | Age: 77
End: 2022-11-29
Payer: MEDICARE

## 2022-11-29 NOTE — TELEPHONE ENCOUNTER
----- Message from Selene Cavanaugh sent at 11/29/2022 11:54 AM CST -----  Contact: Rm/Brother  Type:  Needs Medical Advice    Who Called: Rm  Symptoms (please be specific): Postive Covid/ Chills/Fever  How long has patient had these symptoms:  2 days  Pharmacy name and phone #:    [facility]  [place] [address] [address]  [address] [address] [address] [address]  Phone: [phone] Fax: [phone]  Would the patient rather a call back or a response via [facility]? call  Best Call Back Number: [phone]  Additional Information: Patient's brother request to be informed if patient can receive new prescription. Please give Mr. Abdalla a call back to discuss further.   Thank you,  GH

## 2022-11-29 NOTE — TELEPHONE ENCOUNTER
----- Message from Evelyn Tai sent at 11/29/2022  2:49 PM CST -----  Contact: serjio/brother  Serjio is returning a call. Please call him back at [phone] or [phone].        Thanks  DD

## 2022-12-01 ENCOUNTER — TELEPHONE (OUTPATIENT)
Dept: INTERNAL MEDICINE | Facility: CLINIC | Age: 77
End: 2022-12-01
Payer: MEDICARE

## 2022-12-01 NOTE — TELEPHONE ENCOUNTER
----- Message from Blayne Delgadillo sent at 12/1/2022  1:10 PM CST -----  Contact: Rm Abdalla is requesting a calling in regards pt being diagnosed with covid on Saturday . Rm states patient have a sore throat and bad congestion in her chest . Please call  him back at [phone]      Thanks  CF

## 2022-12-13 ENCOUNTER — TELEPHONE (OUTPATIENT)
Dept: PAIN MEDICINE | Facility: CLINIC | Age: 77
End: 2022-12-13
Payer: MEDICARE

## 2022-12-13 NOTE — TELEPHONE ENCOUNTER
Can you refill?    Tizanidine (Zanaflex) 4mg  Last Visit:10/04  Next Visit: will call to schedule procedure  Last refill:11/04

## 2022-12-16 DIAGNOSIS — M79.18 MYOFASCIAL MUSCLE PAIN: ICD-10-CM

## 2022-12-16 RX ORDER — TIZANIDINE 4 MG/1
TABLET ORAL
Qty: 60 TABLET | Refills: 0 | Status: SHIPPED | OUTPATIENT
Start: 2022-12-16 | End: 2023-01-30 | Stop reason: SDUPTHER

## 2022-12-27 ENCOUNTER — TELEPHONE (OUTPATIENT)
Dept: INTERNAL MEDICINE | Facility: CLINIC | Age: 77
End: 2022-12-27
Payer: MEDICARE

## 2022-12-27 NOTE — TELEPHONE ENCOUNTER
----- Message from Genia Galicia sent at 12/27/2022 11:11 AM CST -----  Contact: Rm  .Type:  Needs Medical Advice    Who Called: Rm   Symptoms (please be specific):  n/a  How long has patient had these symptoms:  n/a  Pharmacy name and phone #:    Would the patient rather a call back or a response via [facility]? call  Best Call Back Number: [phone]   Additional Information:  Rm is requesting a callback afrm the nurse in regards to the patient had covid 5 times and he has questions. Call again if he does not answer.

## 2022-12-27 NOTE — TELEPHONE ENCOUNTER
Spoke with pt brother regarding pt having covid multiple times and stated pt was due for the COVID vaccine, pt brother stated he would tell her she would need to get it./DT

## 2022-12-29 ENCOUNTER — TELEPHONE (OUTPATIENT)
Dept: NEUROLOGY | Facility: CLINIC | Age: 77
End: 2022-12-29
Payer: MEDICARE

## 2022-12-29 NOTE — TELEPHONE ENCOUNTER
Phoned patient to cx/ r's appointment with Priya SAMAYOA NP. However patient v/m box was full. -[facility]  12/29/2022 1:50pm

## 2023-01-04 ENCOUNTER — TELEPHONE (OUTPATIENT)
Dept: NEUROLOGY | Facility: CLINIC | Age: 78
End: 2023-01-04
Payer: MEDICARE

## 2023-01-04 NOTE — TELEPHONE ENCOUNTER
----- Message from Oly Hernandez sent at 1/4/2023  3:36 PM CST -----  Contact: Jetty  Patient calling to speak with the nurse regarding rescheduling 01/06/2023 @ 10:30 am appointment per provider. Attempted to reschedule but did allow to see first available appointment. Please give a call back at [phone] as requested.  Thanks  LR

## 2023-01-18 DIAGNOSIS — I25.118 CORONARY ARTERY DISEASE OF NATIVE ARTERY OF NATIVE HEART WITH STABLE ANGINA PECTORIS: ICD-10-CM

## 2023-01-18 NOTE — TELEPHONE ENCOUNTER
No new care gaps identified.  [facility] Embedded Care Gaps. Reference number: [id number]. 1/18/2023   2:28:27 PM CST

## 2023-01-19 RX ORDER — ROFLUMILAST 500 UG/1
1 TABLET ORAL DAILY
Qty: 90 TABLET | Refills: 3 | OUTPATIENT
Start: 2023-01-19

## 2023-01-19 RX ORDER — ATORVASTATIN CALCIUM 40 MG/1
40 TABLET, FILM COATED ORAL DAILY
Qty: 90 TABLET | Refills: 3 | OUTPATIENT
Start: 2023-01-19

## 2023-01-24 ENCOUNTER — TELEPHONE (OUTPATIENT)
Dept: PAIN MEDICINE | Facility: CLINIC | Age: 78
End: 2023-01-24
Payer: MEDICARE

## 2023-01-25 ENCOUNTER — TELEPHONE (OUTPATIENT)
Dept: PAIN MEDICINE | Facility: CLINIC | Age: 78
End: 2023-01-25
Payer: MEDICARE

## 2023-01-25 DIAGNOSIS — I25.118 CORONARY ARTERY DISEASE OF NATIVE ARTERY OF NATIVE HEART WITH STABLE ANGINA PECTORIS: ICD-10-CM

## 2023-01-25 RX ORDER — RANOLAZINE 1000 MG/1
1000 TABLET, EXTENDED RELEASE ORAL 2 TIMES DAILY
Qty: 180 TABLET | Refills: 3 | OUTPATIENT
Start: 2023-01-25

## 2023-01-25 NOTE — TELEPHONE ENCOUNTER
Brother reached out in regards to sister Zanaflex refill     LOV 10/4/22  F/U 2/1/23  [facility]   Contraindicated

## 2023-01-25 NOTE — TELEPHONE ENCOUNTER
----- Message from Kevin Lyons sent at 1/25/2023  8:14 AM CST -----  Contact: Rm (brother)  Rm would like a call back at [phone], in regards to discussing the pt.

## 2023-01-30 DIAGNOSIS — I10 ESSENTIAL HYPERTENSION: Primary | ICD-10-CM

## 2023-01-30 DIAGNOSIS — M79.18 MYOFASCIAL MUSCLE PAIN: ICD-10-CM

## 2023-01-30 DIAGNOSIS — R00.2 PALPITATIONS: ICD-10-CM

## 2023-01-30 RX ORDER — TIZANIDINE 4 MG/1
TABLET ORAL
Qty: 60 TABLET | Refills: 0 | Status: SHIPPED | OUTPATIENT
Start: 2023-01-30 | End: 2023-03-03

## 2023-01-30 NOTE — TELEPHONE ENCOUNTER
----- Message from Genia Galicia sent at 1/30/2023  1:29 PM CST -----  Contact: Rm Abdalla, the patient' sbrother is requesting a callback from the nurse in regards to her prescription not being called in for RX tiZANidine (ZANAFLEX) 4 MG tablet. Please advise.     Rm can be reached at [phone]    Thanks

## 2023-01-31 NOTE — PROGRESS NOTES
Subjective:    Patient ID:  Shireen Felix is a 77 y.o. female who presents for evaluation of Palpitations      77 yoF with no known arrhythmia diagnosis, referred for arrhythmia management. She was treated for an undiagnosed tachycardia with beta blockers for many years. Her symptoms are currently not active. She is not clear why she was referred for an arrhythmia clinic.     Echo 8/21:  · The left ventricle is normal in size with concentric hypertrophy and normal systolic function.  · The estimated ejection fraction is 60%.  · Normal left ventricular diastolic function.  · With normal right ventricular systolic function.    NM Stress 8/21:     ·  Normal myocardial perfusion scan. There is no evidence of myocardial ischemia or infarction.  ·  The gated perfusion images showed an ejection fraction of 69% at rest. The gated perfusion images showed an ejection fraction of 58% post stress.  ·  There is normal wall motion at rest and post stress.  ·  LV cavity size is normal at rest and normal at stress.  ·  The EKG portion of this study is negative for ischemia.  ·  The patient reported no chest pain during the stress test.  ·  During stress, rare PVCs are noted.    Holter monitor 8/21:  Monitoring started at 10:38 AM and continued for 47 hr 46 min. The average heart rate was 95 BPM. The minimum heart  rate was 75 BPM, occurring at 2:25:24 AM D1. The maximum heart rate was 136 BPM, occurring at 1:43:57 PM D2.     Ventricular ectopic activity consisted of 510 beats, of which, 18 were in 3 runs, 9 were in triplets, 6 were in couplets, 387  were in single PVCs, 70 were single VEs, 20 were in bigeminy. The longest ventricular run occurred at 12:26:05 PM D1, consisting  of 8 beats, with maximum heart rate of 101 BPM. The fastest ventricular run occurred at 1:26:55 PM D1, consisting of 5 beats, with  maximum heart rate of 124 BPM, this appeared to be a rate related BBB.     Supraventricular ectopic activity consisted of  37 beats, of which, 4 were in atrial couplets, 2 were late beats, 31 were  single PACs. The longest R-R interval was 1.2 seconds occurring at 9:49:24 AM D1. The longest N-N interval was 1.2 seconds  occurring at 9:49:24 AM D1.     The patient's rhythm included 15 hr 7 min 59 sec of tachycardia. The fastest single episode of tachycardia occurred at  1:52:14 PM D2, lasting 42 min 41 sec, with maximum heart rate of 136 BPM.    Past Medical History:  8/5/2019: Abnormal ECG  No date: Aneurysm  No date: Anticoagulant long-term use  No date: Arthritis  8/5/2019: CAD in native artery  No date: COPD (chronic obstructive pulmonary disease)  No date: Diabetes mellitus  10/10/2019: Fall      Comment:  Formatting of this note might be different from the                original. Found sitting on floor next to bed last night                Mild confusion today Does not recall falling or how she                ended up on floor UA today Labs yesterday unremarkable  No date: Glaucoma  No date: Hypertension  No date: Seizures  05/27/2017: Shingles  No date: Stroke    Past Surgical History:  No date: BRAIN SURGERY  No date: CARDIAC CATHETERIZATION  No date: CORONARY ANGIOPLASTY  11/12/2019: EPIDURAL STEROID INJECTION INTO LUMBAR SPINE; Bilateral      Comment:  Procedure: TF XANDER L4/5;  Surgeon: Desean Dias MD;                 Location: [facility];  Service: Pain Management;                 Laterality: Bilateral;  No date: HYSTERECTOMY  1/31/2020: INJECTION OF ANESTHETIC AGENT AROUND MEDIAL BRANCH NERVES   INNERVATING LUMBAR FACET JOINT; Bilateral      Comment:  Procedure: Bilateral L3-5 MBB;  Surgeon: Desean Dias MD;  Location: [facility];  Service: Pain                Management;  Laterality: Bilateral;  11/16/2021: INJECTION OF ANESTHETIC AGENT INTO SACROILIAC JOINT; Right      Comment:  Procedure: Right BLOCK, SACROILIAC JOINT Right GTB with                RN IV sedation;  Surgeon: Yao Fulton MD;   Location:                [facility];  Service: Pain Management;  Laterality:                Right;  2020: INJECTION OF JOINT; Bilateral      Comment:  Procedure: Bilateral shoulder GH Joint injection with                local;  Surgeon: Desean Dias MD;  Location: [facility]                [facility]IN [facility];  Service: Pain Management;  Laterality:                Bilateral;  2019: TRANSFORAMINAL EPIDURAL INJECTION OF STEROID; Bilateral      Comment:  Procedure: Bilateral L3/4 Transforaminal Epidural                Steroid Injection;  Surgeon: Desean Dias MD;                 Location: [facility];  Service: Pain Management;                 Laterality: Bilateral;  3/10/2020: TRANSFORAMINAL EPIDURAL INJECTION OF STEROID; Bilateral      Comment:  Procedure: Right T12/L1 TF XANDER with local;  Surgeon:                Desean Dias MD;  Location: [facility];  Service:                Pain Management;  Laterality: Bilateral;  2020: TRANSFORAMINAL EPIDURAL INJECTION OF STEROID; Right      Comment:  Procedure: Right T12/L1 TFESI Covid day of procedure;                 Surgeon: Desean Dias MD;  Location: [facility];                 Service: Pain Management;  Laterality: Right;    Social History    Socioeconomic History      Marital status: Single    Tobacco Use      Smoking status: Former        Packs/day: 1.00        Years: 42.00        Pack years: 42        Types: Cigarettes        Start date:         Quit date: 2004        Years since quittin.8      Smokeless tobacco: Never    Substance and Sexual Activity      Alcohol use: No      Drug use: Never      Sexual activity: Not Currently        Partners: Male    Social History Narrative      No pets or smokers in household.    Review of patient's family history indicates:    Current Outpatient Medications:  acetaminophen (TYLENOL) 500 MG tablet, Take 500 mg by mouth as needed for Pain., Disp: , Rfl:   albuterol (PROVENTIL) 2.5 mg /3 mL (0.083  %) nebulizer solution, Take 3 mLs (2.5 mg total) by nebulization every 4 (four) hours as needed. Rescue, Disp: 150 mL, Rfl: 11  albuterol (PROVENTIL/VENTOLIN HFA) 90 mcg/actuation inhaler, INHALE TWO PUFFS INTO THE LUNGS EVERY 4 HOURS AS NEEDED, Disp: 18 g, Rfl: 11  amLODIPine (NORVASC) 10 MG tablet, TAKE 1 TABLET BY MOUTH ONCE A DAY, Disp: 90 tablet, Rfl: 3  APTIOM 800 mg Tab, TAKE 1 TABLET BY MOUTH EVERY EVENING WITH 400MG TABLET, Disp: 30 tablet, Rfl: 5  atorvastatin (LIPITOR) 40 MG tablet, TAKE 1 TABLET BY MOUTH ONCE DAILY, Disp: 90 tablet, Rfl: 1  cholecalciferol, vitamin D3, 1,250 mcg (50,000 unit) capsule, Take 1 capsule (50,000 Units total) by mouth every 7 days., Disp: 12 capsule, Rfl: 0  clopidogreL (PLAVIX) 75 mg tablet, TAKE 1 TABLET BY MOUTH ONCE DAILY, Disp: 90 tablet, Rfl: 3  DALIRESP 500 mcg Tab, TAKE 1 TABLET BY MOUTH ONCE DAILY, Disp: 90 tablet, Rfl: 3  denosumab (PROLIA) 60 mg/mL Syrg, every 6 (six) months., Disp: , Rfl:   diclofenac (VOLTAREN) 25 MG TbEC, TAKE 1 TABLET BY MOUTH TWICE DAILY (Patient not taking: Reported on 11/17/2022), Disp: 60 tablet, Rfl: 11  diltiaZEM (DILACOR XR) 120 MG CDCR, Take 1 capsule (120 mg total) by mouth once daily., Disp: 30 capsule, Rfl: 3  doxycycline (MONODOX) 100 MG capsule, Take 100 mg by mouth 2 (two) times daily., Disp: , Rfl:   EScitalopram oxalate (LEXAPRO) 20 MG tablet, TAKE 1 TABLET BY MOUTH ONCE DAILY, Disp: 90 tablet, Rfl: 1  eslicarbazepine (APTIOM) 400 mg Tab tablet, TAKE 1 TABLET BY MOUTH EVERY EVENING WITH 800 MG TABLETStrength: 400 mg, Disp: 30 tablet, Rfl: 5  fluticasone propionate (FLONASE) 50 mcg/actuation nasal spray, USE 2 SPRAYS INTO EACH NOSTRIL ONCE A DAY AT 6AM AS DIRECTED, Disp: 16 g, Rfl: 11  fluticasone-umeclidin-vilanter (TRELEGY ELLIPTA) 200-62.5-25 mcg inhaler, INHALE 1 PUFF BY MOUTH ONCE A DAY, Disp: 60 each, Rfl: 11  gabapentin (NEURONTIN) 100 MG capsule, Take 100 mg by mouth 2 (two) times daily as needed., Disp: , Rfl:    hydroCHLOROthiazide (HYDRODIURIL) 25 MG tablet, TAKE 1 TABLET BY MOUTH ONCE DAILY AS NEEDED, Disp: 90 tablet, Rfl: 3  ipratropium (ATROVENT) 42 mcg (0.06 %) nasal spray, USE 2 SPRAYS INTO EACH NOSTRIL FOUR TIMES DAILY, Disp: 15 mL, Rfl: 11  ketoconazole (NIZORAL) 2 % cream, Apply topically 2 (two) times daily. For dry flaky patches of ear., Disp: 30 g, Rfl: 1  latanoprost 0.005 % ophthalmic solution, Place 1 drop into both eyes every evening., Disp: 2.5 mL, Rfl: 12  levocetirizine (XYZAL) 5 MG tablet, Take 1 tablet (5 mg total) by mouth every evening., Disp: 30 tablet, Rfl: 11  magnesium oxide (MAG-OX) 400 mg (241.3 mg magnesium) tablet, Take 1 tablet (400 mg total) by mouth once daily., Disp: 90 tablet, Rfl: 1  metFORMIN (GLUCOPHAGE) 500 MG tablet, TAKE 1 TABLET BY MOUTH ONCE DAILY, Disp: 90 tablet, Rfl: 3  metoprolol tartrate (LOPRESSOR) 100 MG tablet, Take 1 tablet (100 mg total) by mouth 2 (two) times daily., Disp: 180 tablet, Rfl: 1  montelukast (SINGULAIR) 10 mg tablet, TAKE 1 TABLET BY MOUTH ONCE A DAY, Disp: 90 tablet, Rfl: 3  MYRBETRIQ 50 mg Tb24, TAKE 1 TABLET BY MOUTH ONCE DAILY, Disp: 30 tablet, Rfl: 10  nitroGLYCERIN (NITROSTAT) 0.4 MG SL tablet, DISSOLVE 1 TABLET UNDER TONGUE EVERY 5 MINUTES FOR CHEST PAIN (MAY TAKE UP TO 3 DOSES THEN SEEK MEDICAL ATTENTION), Disp: 25 tablet, Rfl: 0  nortriptyline (PAMELOR) 25 MG capsule, Take 1 capsule (25 mg total) by mouth every evening., Disp: 90 capsule, Rfl: 0  omeprazole (PRILOSEC) 40 MG capsule, Take 40 mg by mouth every morning., Disp: , Rfl:   pantoprazole (PROTONIX) 40 MG tablet, Take 1 tablet (40 mg total) by mouth 2 (two) times daily., Disp: 60 tablet, Rfl: 11  potassium chloride SA (K-DUR,KLOR-CON) 20 MEQ tablet, Take 1 tablet (20 mEq total) by mouth once daily., Disp: 90 tablet, Rfl: 1  predniSONE (DELTASONE) 20 MG tablet, TAKE 3 TABLETS DAILY FOR 3 DAYS then 2 TABLETS DAILY FOR 3 DAYS then 1 TABLET DAILY FOR 3 DAYS then 1/2 TABLET DAILY FOR 4 DAYS,  Disp: 20 tablet, Rfl: 0  promethazine-dextromethorphan (PROMETHAZINE-DM) 6.25-15 mg/5 mL Syrp, Take by mouth., Disp: , Rfl:   ranolazine (RANEXA) 1,000 mg Tb12, Take 1 tablet (1,000 mg total) by mouth 2 (two) times daily., Disp: 180 tablet, Rfl: 3  timolol maleate 0.5% (TIMOPTIC) 0.5 % Drop, Place 1 drop into both eyes every morning., Disp: 5 mL, Rfl: 12  tiZANidine (ZANAFLEX) 4 MG tablet, TAKE 1 TABLET BY MOUTH TWICE DAILY AS NEEDED Strength: 4 mg, Disp: 60 tablet, Rfl: 0  triamcinolone acetonide 0.025% (KENALOG) 0.025 % cream, Apply topically 2 (two) times daily. PRN rash and itching of ear. Mild steroid cream., Disp: 30 g, Rfl: 0  valsartan (DIOVAN) 320 MG tablet, Take 1 tablet (320 mg total) by mouth once daily., Disp: 30 tablet, Rfl: 3    Current Facility-Administered Medications:  acetaminophen tablet 650 mg, 650 mg, Oral, Once PRN, Darwin Perkins MD        Review of Systems   Constitutional: Negative.   HENT: Negative.     Eyes: Negative.    Cardiovascular:  Negative for chest pain, dyspnea on exertion, leg swelling, near-syncope, palpitations and syncope.   Respiratory: Negative.  Negative for shortness of breath.    Endocrine: Negative.    Hematologic/Lymphatic: Negative.    Skin: Negative.    Musculoskeletal: Negative.    Gastrointestinal: Negative.    Genitourinary: Negative.    Neurological: Negative.  Negative for dizziness and light-headedness.   Psychiatric/Behavioral: Negative.     Allergic/Immunologic: Negative.       Objective:    Physical Exam  Vitals reviewed.   Constitutional:       General: She is not in acute distress.     Appearance: She is well-developed.   HENT:      Head: Normocephalic and atraumatic.   Eyes:      Pupils: Pupils are equal, round, and reactive to light.   Neck:      Thyroid: No thyromegaly.      Vascular: No JVD.   Cardiovascular:      Rate and Rhythm: Normal rate and regular rhythm.      Chest Wall: PMI is not displaced.      Heart sounds: Normal heart sounds, S1 normal  and S2 normal. No murmur heard.    No friction rub. No gallop.   Pulmonary:      Effort: Pulmonary effort is normal. No respiratory distress.      Breath sounds: Normal breath sounds. No wheezing or rales.   Abdominal:      General: Bowel sounds are normal. There is no distension.      Palpations: Abdomen is soft.      Tenderness: There is no abdominal tenderness. There is no guarding or rebound.   Musculoskeletal:         General: No tenderness. Normal range of motion.      Cervical back: Normal range of motion.   Skin:     General: Skin is warm and dry.      Findings: No erythema or rash.   Neurological:      Mental Status: She is alert and oriented to person, place, and time.      Cranial Nerves: No cranial nerve deficit.   Psychiatric:         Behavior: Behavior normal.         Thought Content: Thought content normal.         Judgment: Judgment normal.         Assessment:       1. Embolism and thrombosis of unspecified artery    2. Chronic diastolic heart failure    3. Coronary artery disease of native artery of native heart with stable angina pectoris    4. Controlled type 2 diabetes mellitus with stage 3 chronic kidney disease, without long-term current use of insulin    5. Hypertension associated with diabetes         Plan:       77 yoF here for arrhythmia management. She has no formal arrhythmia diagnosis. Will check for occult arrhythmias with 2w event monitor. If no significant arrhythmias, will have her return as needed.

## 2023-01-31 NOTE — PROGRESS NOTES
Established Patient Chronic Pain Clinic Visit    Chief Pain Complaint:  Neck Pain   Lumbar Back Pain   Facial Pain     Interval History (1/31/2023):  Shireen Felix presents today for follow-up visit.  Patient was last seen on 10/4/2022. At that visit, the plan was to schedule bilateral L3-5 MBB followed 2 weeks later by XANDER.  She canceled these procedures and reports at this time she is not interested in scheduling.  She reports she has a  and goes to the gym 3-4 times per week which includes walking on a treadmill.   Patient reports pain as 7/10 today. She reports worsening neck pain with radiation into her left arm down to her hand with cramping and burning. This has been severe over the past week. Last cervical CT in 2019, she reports she has sustained multiple falls since then. She also reports worsening symptoms down her left leg, interfering with her ability to walk at times due to significant weakness.      Interval history 10/04/2022  Ms. Felix is a 76-year-old female with past medical history significant for cerebral aneurysm status post craniotomy and repair, cerebral vascular accident, left V1 distribution post herpetic neuralgia, depression, glaucoma, asthma/COPD, coronary artery disease, GERD, nicotine dependence, type 2 diabetes who presents to establish care, previous Dr. Dias and Dr. Fulton patient.  Today patient reports pain in the lower back and legs.  Pain is constant today is rated 7/10.  Patient reports pain in a bandlike distribution in the lower back which radiates down the posterior aspects of bilateral lower extremities in L5-S1 distribution to mid thigh.  Pain is described as burning and aching in nature.  Pain is exacerbated when moving from sitting to standing and standing and ambulating just a few feet.  Patient does report associated weakness in the lower extremities associated with her pain.  Patient reports she is able to ambulate with assistive device, walker  only a few feet before requiring rest.  Pain has been improved with prior procedures, including medial branch block and transforaminal epidural steroid injection.  Patient is interested in pursuing repeat injection.  Of note patient has trialed medicinal marijuana with Dr. Mckeon and reports palpitations side effect.  Patient has discontinued tramadol, tizanidine and gabapentin.  Pain is improved with prednisone which she takes prescribed by her pulmonologist.    Interval history:  Dr. Fulton:  05/10/2021-05/06/2022  Shireen Felix is a 76 y.o. female  who is presenting with a chief complaint of Neck Pain. The patient began experiencing this problem insidiously, and the pain has been gradually worsening over the past 6 month(s). The pain is described as throbbing, cramping, aching and heavy and is located in the bilateral cervical spine. Pain is intermittent and lasts hours. The  pain is nonradiating. The patient rates her pain a 7 out of ten and interferes with activities of daily living a 7 out of ten. Pain is exacerbated by rotation of the cervical spine, and is improved by rest. Patient reports no prior trauma, no prior spinal surgery      This patient is a 74 y.o. female who presents today complaining of the above noted pain/s. The patient describes the pain as follows.  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  She reports approximately 2 years ago she had to sudden deaths in the family which caused very stressful time for her and resulted in shingles rash in the left V1 distribution however she reports having excruciating pain in the left V1 and V2 distributions.  She denies having difficulty with eating food and brushing her teeth on the left side of her face however the left lateral side of her nose gets her excruciating pain.  She has been tried on numerous  medications in the past including gabapentin, Lyrica, Valtrex, Celebrex, ibuprofen which have all provided minimal benefit however steroids have been most helpful.  Today she rates her pain as an 4/10 and describes a constant burning sensation the left side of her face in addition to radiation into her bilateral lower extremities, her right shoulder, her left upper extremity occasionally as a pins and needle sensation.  She does report having numbness and weakness in her left lower extremity.  She has been physical therapy which she completed in February of 2019 however this caused most of her low back and leg symptoms to worsen in addition to her post herpetic neuralgia to worsen.  She denies having bowel bladder difficulties.  Her symptoms are worse with activity such as walking in her somewhat improved with rest however she had finds it difficult to get into a comfortable position. She has been using topical lidocaine patches and applying to the left side of her face which does provide significant benefit.  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     Shireen Felix is a 76 y.o. female  who is presenting with a chief complaint of lumbar spine. The patient began experiencing this problem insidiously, and the pain has been gradually worsening over the past 2 year(s). The pain is described as throbbing, shooting, burning, aching and electrical and is located in the bilateral lumbar spine. Pain is intermittent and lasts hours. The pain radiates to bilateral lower extremities. The patient rates her pain a 7 out of ten and interferes with activities of daily living a 6 out of ten. Pain is exacerbated by flexion of the lumbar spine, and is improved by rest. Patient reports no prior trauma, no prior spinal surgery     Interval [facility] 06/03/2020: Dr. Dias:  Ms. Felix returns to  clinic for follow-up.  She underwent a T12-L1 transforaminal epidural steroid injection on March 10, 2020 and she reports that this has provided significant pain relief for her which radiated into her right leg prior to the injection.  She does report that his symptoms have returned and the injections worn off her pain is currently rated 7/10.  She was scheduled to undergo bilateral lumbar radiofrequency ablation prior to corona virus however she would like to hold off on that at this time and pursue repeat injection in the low back.  Unfortunately she also reports that she had her wheelchair fall over a few weeks ago and this has resulted in bilateral shoulder pain.  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  She finds that rest does provide some pain relief on activity causes her symptoms to worsen.  She has been able to walk significantly more after her most recent injection in March reported she is able to walk several steps before having to sit rest however this has a vast improvement from prior to the injection.    Interval HPI:  02/12/2020; Dr. Dias  Ms. Felix returns to clinic for follow-up.  She underwent bilateral lumbar medial branch blocks on January 31, 2020 and reports 100% symptomatic pain relief for approximately 1 week and his symptoms slowly began to return.  She continues to have some right-sided lower thoracic posterior pain which was not completely dressed with the medial branch blocks.  Today she rates her pain as an 8/10 which is located primarily in the axial lumbar spine and the lower right posterior thoracic region.  She denies having numbness weakness in the legs but does continue to have a constant aching and throbbing sensation in the low back.    Interval HPI 01/23/2020: Dr. Dias: Ms. Felix returns to clinic for follow-up.  She reports that she underwent bilateral L4/5  transforaminal epidural steroid injections in November 2019 reports ongoing 80% symptomatic pain relief.  She has also been participating physical therapy which she has found to be very helpful however 4 days ago she started to do some leg raise is in her bed and she felt severe pain in her low back which has not subsided.  She denies having any radiation except for around her flank into her anterior abdomen.  She feels like the pain sometimes comes straight through from her back to her abdomen.  Today she rates her pain as a 10/10 describes it as a constant aching and sharp pain. She has been using a heating pad which is minimally helpful and she has been holding off on physical therapy at this time. She denies having bowel bladder difficulty.     Interval History: Patient was seen on 8/4/19. At that time she underwent bilateral L3/4 TF XANDER.  The patient reports that she is/was better following the procedure.  she reports 75% pain relief.  She had a fall, then pain increased. She is currently living at [facility]. She is doing at home therapy there, which is helping. She is in wheelchair now but hopes to transition to walker soon.  After the fall, she was not able to get out of the bed.      Initial History of Present Illness:  Dr. Dias  This patient is a 74 y.o. female who presents today complaining of the above noted pain/s. The patient describes the pain as follows.  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  She reports approximately 2 years ago she had to sudden deaths in the family which caused very stressful time for her and resulted in shingles rash in the left V1 distribution however she reports having excruciating pain in the left V1 and V2 distributions.  She denies having difficulty with eating food and brushing her teeth on the left side of her face however the left  lateral side of her nose gets her excruciating pain.  She has been tried on numerous medications in the past including gabapentin, Lyrica, Valtrex, Celebrex, ibuprofen which have all provided minimal benefit however steroids have been most helpful.  Today she rates her pain as an 8/10 and describes a constant burning sensation the left side of her face in addition to radiation into her bilateral lower extremities, her right shoulder, her left upper extremity occasionally as a pins and needle sensation.  She does report having numbness and weakness in her left lower extremity.  She has been physical therapy which she completed in February of 2019 however this caused most of her low back and leg symptoms to worsen in addition to her post herpetic neuralgia to worsen.  She denies having bowel bladder difficulties.  Her symptoms are worse with activity such as walking in her somewhat improved with rest however she had finds it difficult to get into a comfortable position. She has been using topical lidocaine patches and applying to the left side of her face which does provide significant benefit.  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    - pertinent negatives: No fever, No chills, No weight loss, No bladder dysfunction, No bowel dysfunction, No saddle anesthesia  - pertinent positives: generalized nonspecific Lower Extremity weakness bilaterally    - medications, other therapies tried (physical therapy, injections):     >> NSAIDs, Tylenol, Tramadol, Norco, gabapentin, lyrica, flexeril and medrol dose pack    >> Has previously undergone Physical Therapy      Pain injections:  -11/16/2021: Right-sided SI joint and greater trochanteric bursa injection; Dr. Fulton  -07/09/2020: Bilateral glenohumeral joint injection; Dr. Dias  -06/19/2020: Right-sided T12/L1 transforaminal epidural  steroid injection; Dr. Dias  -03/10/2020:  T12/L1 transforaminal epidural steroid injection; Dr. Dias  -01/31/2020: Bilateral L3-5 medial branch blocks; Dr. Dias  -11/12/2019: Bilateral L4/5 transforaminal epidural steroid injection; Dr. Dias        Imaging / Labs / Studies (reviewed on 2/3/2023):  02/06/20    CT Lumbar Spine Without Contrast    Narrative  EXAMINATION:  CT LUMBAR SPINE WITHOUT CONTRAST    CLINICAL HISTORY:  Low back painLow back pain, >6wks conservative tx, persistent-progressive sx, surgical candidate;    FINDINGS:  The lumbar vertebral bodies demonstrate adequate alignment.Disc space narrowing from T11 through S1.No acute fractures are identified.    T11-T12: Disc space narrowing.  Broad-based disc bulge.  Old vertebral endplate fracture of T12.    T12-L1: Small disc protrusion paramedian to the right side.  Degenerative changes of the vertebral endplates and facet joints.    L1-L2: Old fracture of the inferior endplate of L1.  Mild posterior displacement of a fracture fragment minimally narrowing the AP diameter of the canal.  Gas in the disc space consistent with degenerating disc material.    L2-L3: Circumferential disc bulge.  Bilateral degenerative changes of the facets.  Hypertrophy of the ligamentum flavum.  Moderate to severe central spinal stenosis.  Bony neural foraminal narrowing without definite nerve root impingement.    L3-L4: Circumferential disc bulge.  Bilateral degenerative changes of the facets.  Degenerative changes of the vertebral endplates.  Severe central spinal stenosis.  Hypertrophy of the ligamentum flavum and degenerative changes of the facets.    L4-L5: Circumferential disc bulge.  Severe central spinal stenosis.  Bilateral degenerative changes of the facets.  Bilateral bony neural foraminal narrowing and possible bilateral L4 nerve root impingement.    L5-S1: Circumferential disc bulge.  Gas formation within the disc.  Moderate central spinal stenosis.  Bilateral  degenerative changes of the facets.  Bilateral bony neural foraminal narrowing with possible bilateral L5 nerve root impingement.    Atherosclerotic abdominal aorta without aneurysmal dilatation.     07/31/19    CT Cervical Spine Without Contrast    FINDINGS:  Osteopenia.  Grade 2 degenerative spondylolisthesis at C3-C4.  Grade 1 degenerative spondylolisthesis at C4-C5.  Vertebral body height is normal.  No acute fractures. No prevertebral soft tissue swelling. Atlanto-occipital articulation is normal.  Congenitally patulous spinal canal.  No significant spinal stenosis.  Moderate left foraminal stenosis at C3-C4.    01/23/20    X-Ray Lumbar Spine Ap And Lateral  FINDINGS:  Levoscoliosis present.  Vertebral body heights stable.  Alignment unchanged without spondylolisthesis.  Similar appearing multilevel degenerative disc height loss and osteophyte findings noted.  Multilevel facet degenerative findings remain.  Aorta iliac atherosclerotic calcification.  Colonic constipation findings present.    6/04/19    X-Ray Cervical Spine AP And Lateral    FINDINGS:  Reversal normal C-spine curvature noted on the lateral view with visualization to the C7-T1 level.  Minimal anterior subluxation C4 on C5 noted.  There is multilevel marginal spurring and varying degrees of loss of disc height throughout the C-spine.  No acute fracture identified in prevertebral soft tissues, C1-2 articulation and odontoid appear within normal limits allowing for positioning.  Minimal vascular calcification identified on the left in the area of the carotid vessels.    Review of Systems:  CONSTITUTIONAL: patient denies any fever, chills, or weight loss  SKIN: patient denies any rash or itching  RESPIRATORY: patient denies having any shortness of breath  GASTROINTESTINAL: patient denies having any diarrhea, constipation, or bowel incontinence  GENITOURINARY: patient denies having any abnormal bladder function    MUSCULOSKELETAL:  - patient complains  "of the above noted pain/s (see chief pain complaint)    NEUROLOGICAL:   - pain as above  - strength in Upper and Lower extremities is decreased, BILATERALLY  - sensation in Upper extremities is intact, BILATERALLY  - patient denies any loss of bowel or bladder control      PSYCHIATRIC: patient denies any change in mood    Other:  All other systems reviewed and are negative      Physical Exam:  BP (!) 169/73   Pulse 71   Ht 5' 4" (1.626 m)   Wt 55.1 kg (121 lb 7.6 oz)   LMP  (LMP Unknown)   BMI 20.85 kg/m²  (reviewed on 2/3/2023)  Physical Exam    GENERAL: Well appearing, in no acute distress, alert and oriented x3.  PSYCH:  Mood and affect appropriate.  SKIN: Skin color, texture, turgor normal, no rashes or lesions.  HEAD/FACE:  Normocephalic, atraumatic. Cranial nerves grossly intact.    CV: RRR with palpation of the radial artery.  PULM: No evidence of respiratory difficulty, symmetric chest rise.  GI:  Soft and non-tender.  Neck: TTP along cervical facet joints, positive facet loading, spurlings equivocal on right, positive on left, pain with flexion, extension, and rotation.  BACK: Straight leg raising in the sitting and supine positions is negative to radicular pain. pain to palpation over the facet joints of the lumbar spine or spinous processes.  Reduced range of motion with pain reproduction   EXTREMITIES:  Peripheral joint range of motion is reduced with pain with obvious instability in bilateral lower extremities.  No deformities, edema, or skin discoloration. Good capillary refill.  MUSCULOSKELETAL:  Unable to stand on heels and toes  hip, and knee provocative maneuvers are negative.  There is no pain with palpation over the sacroiliac joints bilaterally.  Gaenslen's, Distraction/Compression and  FABERs test is negative.  Facet loading test is positive bilaterally.   Bilateral upper and lower extremity strength is normal and symmetric.  No atrophy or tone abnormalities are noted.    RIGHT Lower " extremity: Hip flexion 5/5, Hip Abduction 5/5, Hip Adduction 5/5, Knee extension 5/5, Knee flexion 5/5, Ankle dorsiflexion5/5, Extensor hallucis longus 5/5, Ankle plantarflexion 5/5  LEFT Lower extremity:  Hip flexion 5/5, Hip Abduction 5/5,Hip Adduction 5/5, Knee extension 5/5, Knee flexion 5/5, Ankle dorsiflexion 5/5, Extensor hallucis longus 5/5, Ankle plantarflexion 5/5  -Normal testing knee (patellar) jerk and ankle (achilles) jerk    NEURO: Bilateral upper and lower extremity coordination and muscle stretch reflexes are physiologic and symmetric. No loss of sensation is noted.  GAIT:  Patient presents in wheelchair today.  Antalgic gait.      Assessment:    Shireen Felix is a 77 y.o. year old female who is presenting with   Encounter Diagnoses   Name Primary?    Cervicalgia Yes    Lumbar radiculopathy, chronic            Plan:    1. Interventional:  None at this time, consider lumbar XANDER and cervical XANDER, we will first review updated cervical and lumbar CT and EMG  Anticoagulation:  Yes, Plavix  Per ASA guidelines for secondary prophylaxis, Dr. Felix.    2. Pharmacologic:       - Will prescribe Medrol Dose pack with instructions:  Day 1: 2 tablets before breakfast, 1 tablet after lunch, 1 tablet after dinner, 2 tablets at bedtime   Day 2: 1 tablet before breakfast, 1 tablet after lunch, 1 tablet after dinner, 2 tablets at bedtime   Day 3: 1 tablet before breakfast, 1 tablet after lunch, 1 tablet after dinner, 1 tablet at bedtime   Day 4: 1 tablet before breakfast, 1 tablet after lunch, 1 tablet at bedtime   Day 5: 1 tablet before breakfast, 1 tablet at bedtime   Day 6: 1 tablet before breakfast.    3. Rehabilitative:   -We discussed initiating physical therapy to help manage the patient/s painful condition. The patient was counseled that muscle strengthening will improve the long term prognosis in regards to pain and may also help increase range of motion and mobility. They were told that one of the  goals of physical therapy is that they learn how to do the exercises so that they can do them independently at home daily upon completion. The patient's questions were answered and they were agreeable to this course. A referral for physical therapy was provided to the patient.      4. Diagnostic:  Relevant imaging reviewed and patient's questions answered. CT of cervical and lumbar spine ordered to further evaluate for source of pain and weakness, EMG of upper extremities ordered to further evaluate worsening left arm pain/weakness    5. Follow up:  4 weeks for CT and EMG review    Alanis Peres PA-C          The above plan and management options were discussed at length with patient. Patient is in agreement with the above and verbalized understanding.    - I discussed the goals of interventional chronic pain management with the patient on today's visit. We discussed a multimodal and systematic approach to pain.  This includes diagnostic and therapeutic injections, adjuvant pharmacologic treatment, physical therapy, and at times psychiatry.  I emphasized the importance of regular exercise, core strengthening and stretching, diet and weight loss as a cornerstone of long-term pain management.    - This condition does not require this patient to take time off of work, and the primary goal of our Pain Management services is to improve the patient's functional capacity.  - Patient Questions: Answered all of the patient's questions regarding diagnoses, therapy, treatment and next steps    Disclaimer:  This note may have been prepared using voice recognition software, it may have not been extensively proofed, as such there could be errors within the text such as sound alike errors.

## 2023-02-01 ENCOUNTER — OFFICE VISIT (OUTPATIENT)
Dept: PAIN MEDICINE | Facility: CLINIC | Age: 78
End: 2023-02-01
Payer: MEDICARE

## 2023-02-01 ENCOUNTER — HOSPITAL ENCOUNTER (OUTPATIENT)
Dept: CARDIOLOGY | Facility: HOSPITAL | Age: 78
Discharge: HOME | End: 2023-02-01
Attending: INTERNAL MEDICINE
Payer: MEDICARE

## 2023-02-01 ENCOUNTER — OFFICE VISIT (OUTPATIENT)
Dept: CARDIOLOGY | Facility: CLINIC | Age: 78
End: 2023-02-01
Payer: MEDICARE

## 2023-02-01 ENCOUNTER — TELEPHONE (OUTPATIENT)
Dept: PHYSICAL MEDICINE AND REHAB | Facility: CLINIC | Age: 78
End: 2023-02-01
Payer: MEDICARE

## 2023-02-01 VITALS
HEART RATE: 71 BPM | SYSTOLIC BLOOD PRESSURE: 169 MMHG | BODY MASS INDEX: 20.74 KG/M2 | WEIGHT: 121.5 LBS | HEIGHT: 64 IN | DIASTOLIC BLOOD PRESSURE: 73 MMHG

## 2023-02-01 VITALS
HEIGHT: 64 IN | HEART RATE: 70 BPM | DIASTOLIC BLOOD PRESSURE: 76 MMHG | WEIGHT: 121.5 LBS | SYSTOLIC BLOOD PRESSURE: 132 MMHG | BODY MASS INDEX: 20.74 KG/M2

## 2023-02-01 DIAGNOSIS — R00.2 PALPITATIONS: ICD-10-CM

## 2023-02-01 DIAGNOSIS — N18.30 CONTROLLED TYPE 2 DIABETES MELLITUS WITH STAGE 3 CHRONIC KIDNEY DISEASE, WITHOUT LONG-TERM CURRENT USE OF INSULIN: ICD-10-CM

## 2023-02-01 DIAGNOSIS — M54.2 CERVICALGIA: Primary | ICD-10-CM

## 2023-02-01 DIAGNOSIS — E11.59 HYPERTENSION ASSOCIATED WITH DIABETES: ICD-10-CM

## 2023-02-01 DIAGNOSIS — I15.2 HYPERTENSION ASSOCIATED WITH DIABETES: ICD-10-CM

## 2023-02-01 DIAGNOSIS — I10 ESSENTIAL HYPERTENSION: ICD-10-CM

## 2023-02-01 DIAGNOSIS — I25.118 CORONARY ARTERY DISEASE OF NATIVE ARTERY OF NATIVE HEART WITH STABLE ANGINA PECTORIS: ICD-10-CM

## 2023-02-01 DIAGNOSIS — I50.32 CHRONIC DIASTOLIC HEART FAILURE: ICD-10-CM

## 2023-02-01 DIAGNOSIS — M54.16 LUMBAR RADICULOPATHY, CHRONIC: ICD-10-CM

## 2023-02-01 DIAGNOSIS — E11.22 CONTROLLED TYPE 2 DIABETES MELLITUS WITH STAGE 3 CHRONIC KIDNEY DISEASE, WITHOUT LONG-TERM CURRENT USE OF INSULIN: ICD-10-CM

## 2023-02-01 DIAGNOSIS — I74.9 EMBOLISM AND THROMBOSIS OF UNSPECIFIED ARTERY: Primary | ICD-10-CM

## 2023-02-01 PROCEDURE — 99999 PR PBB SHADOW E&M-EST. PATIENT-LVL V: CPT | Mod: PBBFAC,,, | Performed by: PHYSICIAN ASSISTANT

## 2023-02-01 PROCEDURE — 99214 PR OFFICE/OUTPT VISIT, EST, LEVL IV, 30-39 MIN: ICD-10-PCS | Mod: S$GLB,,, | Performed by: PHYSICIAN ASSISTANT

## 2023-02-01 PROCEDURE — 1101F PT FALLS ASSESS-DOCD LE1/YR: CPT | Mod: CPTII,S$GLB,, | Performed by: INTERNAL MEDICINE

## 2023-02-01 PROCEDURE — 1125F AMNT PAIN NOTED PAIN PRSNT: CPT | Mod: CPTII,S$GLB,, | Performed by: PHYSICIAN ASSISTANT

## 2023-02-01 PROCEDURE — 3075F SYST BP GE 130 - 139MM HG: CPT | Mod: CPTII,S$GLB,, | Performed by: INTERNAL MEDICINE

## 2023-02-01 PROCEDURE — 99999 PR PBB SHADOW E&M-EST. PATIENT-LVL V: ICD-10-PCS | Mod: PBBFAC,,, | Performed by: PHYSICIAN ASSISTANT

## 2023-02-01 PROCEDURE — 3077F PR MOST RECENT SYSTOLIC BLOOD PRESSURE >= 140 MM HG: ICD-10-PCS | Mod: CPTII,S$GLB,, | Performed by: PHYSICIAN ASSISTANT

## 2023-02-01 PROCEDURE — 93248 EXT ECG>7D<15D REV&INTERPJ: CPT | Mod: ,,, | Performed by: INTERNAL MEDICINE

## 2023-02-01 PROCEDURE — 93248 CV CARDIAC MONITOR - 3-15 DAY ADULT (CUPID ONLY): ICD-10-PCS | Mod: ,,, | Performed by: INTERNAL MEDICINE

## 2023-02-01 PROCEDURE — 1159F MED LIST DOCD IN RCRD: CPT | Mod: CPTII,S$GLB,, | Performed by: INTERNAL MEDICINE

## 2023-02-01 PROCEDURE — 93005 ELECTROCARDIOGRAM TRACING: CPT

## 2023-02-01 PROCEDURE — 3288F PR FALLS RISK ASSESSMENT DOCUMENTED: ICD-10-PCS | Mod: CPTII,S$GLB,, | Performed by: INTERNAL MEDICINE

## 2023-02-01 PROCEDURE — 3077F SYST BP >= 140 MM HG: CPT | Mod: CPTII,S$GLB,, | Performed by: PHYSICIAN ASSISTANT

## 2023-02-01 PROCEDURE — 1125F PR PAIN SEVERITY QUANTIFIED, PAIN PRESENT: ICD-10-PCS | Mod: CPTII,S$GLB,, | Performed by: PHYSICIAN ASSISTANT

## 2023-02-01 PROCEDURE — 99999 PR PBB SHADOW E&M-EST. PATIENT-LVL V: CPT | Mod: PBBFAC,,, | Performed by: INTERNAL MEDICINE

## 2023-02-01 PROCEDURE — 99999 PR PBB SHADOW E&M-EST. PATIENT-LVL V: ICD-10-PCS | Mod: PBBFAC,,, | Performed by: INTERNAL MEDICINE

## 2023-02-01 PROCEDURE — 1101F PT FALLS ASSESS-DOCD LE1/YR: CPT | Mod: CPTII,S$GLB,, | Performed by: PHYSICIAN ASSISTANT

## 2023-02-01 PROCEDURE — 3075F PR MOST RECENT SYSTOLIC BLOOD PRESS GE 130-139MM HG: ICD-10-PCS | Mod: CPTII,S$GLB,, | Performed by: INTERNAL MEDICINE

## 2023-02-01 PROCEDURE — 1101F PR PT FALLS ASSESS DOC 0-1 FALLS W/OUT INJ PAST YR: ICD-10-PCS | Mod: CPTII,S$GLB,, | Performed by: PHYSICIAN ASSISTANT

## 2023-02-01 PROCEDURE — 3288F PR FALLS RISK ASSESSMENT DOCUMENTED: ICD-10-PCS | Mod: CPTII,S$GLB,, | Performed by: PHYSICIAN ASSISTANT

## 2023-02-01 PROCEDURE — 3288F FALL RISK ASSESSMENT DOCD: CPT | Mod: CPTII,S$GLB,, | Performed by: PHYSICIAN ASSISTANT

## 2023-02-01 PROCEDURE — 3078F DIAST BP <80 MM HG: CPT | Mod: CPTII,S$GLB,, | Performed by: INTERNAL MEDICINE

## 2023-02-01 PROCEDURE — 1125F PR PAIN SEVERITY QUANTIFIED, PAIN PRESENT: ICD-10-PCS | Mod: CPTII,S$GLB,, | Performed by: INTERNAL MEDICINE

## 2023-02-01 PROCEDURE — 1159F MED LIST DOCD IN RCRD: CPT | Mod: CPTII,S$GLB,, | Performed by: PHYSICIAN ASSISTANT

## 2023-02-01 PROCEDURE — 99205 PR OFFICE/OUTPT VISIT, NEW, LEVL V, 60-74 MIN: ICD-10-PCS | Mod: S$GLB,,, | Performed by: INTERNAL MEDICINE

## 2023-02-01 PROCEDURE — 1125F AMNT PAIN NOTED PAIN PRSNT: CPT | Mod: CPTII,S$GLB,, | Performed by: INTERNAL MEDICINE

## 2023-02-01 PROCEDURE — 93010 ELECTROCARDIOGRAM REPORT: CPT | Mod: ,,, | Performed by: INTERNAL MEDICINE

## 2023-02-01 PROCEDURE — 3288F FALL RISK ASSESSMENT DOCD: CPT | Mod: CPTII,S$GLB,, | Performed by: INTERNAL MEDICINE

## 2023-02-01 PROCEDURE — 1101F PR PT FALLS ASSESS DOC 0-1 FALLS W/OUT INJ PAST YR: ICD-10-PCS | Mod: CPTII,S$GLB,, | Performed by: INTERNAL MEDICINE

## 2023-02-01 PROCEDURE — 93010 EKG 12-LEAD: ICD-10-PCS | Mod: ,,, | Performed by: INTERNAL MEDICINE

## 2023-02-01 PROCEDURE — 3078F PR MOST RECENT DIASTOLIC BLOOD PRESSURE < 80 MM HG: ICD-10-PCS | Mod: CPTII,S$GLB,, | Performed by: PHYSICIAN ASSISTANT

## 2023-02-01 PROCEDURE — 99214 OFFICE O/P EST MOD 30 MIN: CPT | Mod: S$GLB,,, | Performed by: PHYSICIAN ASSISTANT

## 2023-02-01 PROCEDURE — 1160F PR REVIEW ALL MEDS BY PRESCRIBER/CLIN PHARMACIST DOCUMENTED: ICD-10-PCS | Mod: CPTII,S$GLB,, | Performed by: PHYSICIAN ASSISTANT

## 2023-02-01 PROCEDURE — 1160F RVW MEDS BY RX/DR IN RCRD: CPT | Mod: CPTII,S$GLB,, | Performed by: PHYSICIAN ASSISTANT

## 2023-02-01 PROCEDURE — 3078F PR MOST RECENT DIASTOLIC BLOOD PRESSURE < 80 MM HG: ICD-10-PCS | Mod: CPTII,S$GLB,, | Performed by: INTERNAL MEDICINE

## 2023-02-01 PROCEDURE — 99205 OFFICE O/P NEW HI 60 MIN: CPT | Mod: S$GLB,,, | Performed by: INTERNAL MEDICINE

## 2023-02-01 PROCEDURE — 3078F DIAST BP <80 MM HG: CPT | Mod: CPTII,S$GLB,, | Performed by: PHYSICIAN ASSISTANT

## 2023-02-01 PROCEDURE — 1159F PR MEDICATION LIST DOCUMENTED IN MEDICAL RECORD: ICD-10-PCS | Mod: CPTII,S$GLB,, | Performed by: PHYSICIAN ASSISTANT

## 2023-02-01 PROCEDURE — 1159F PR MEDICATION LIST DOCUMENTED IN MEDICAL RECORD: ICD-10-PCS | Mod: CPTII,S$GLB,, | Performed by: INTERNAL MEDICINE

## 2023-02-01 RX ORDER — METHYLPREDNISOLONE 4 MG/1
TABLET ORAL
Qty: 1 EACH | Refills: 0 | Status: SHIPPED | OUTPATIENT
Start: 2023-02-01 | End: 2023-05-16

## 2023-02-06 ENCOUNTER — TELEPHONE (OUTPATIENT)
Dept: INTERNAL MEDICINE | Facility: CLINIC | Age: 78
End: 2023-02-06
Payer: MEDICARE

## 2023-02-06 NOTE — TELEPHONE ENCOUNTER
----- Message from Adriana Maguire sent at 2/6/2023  1:31 PM CST -----  Contact: Toni/Corrie Muñoz is calling to check on the status of the fax request for Diabetes supply that was sent on 2/2/23 and 2/6/23, please call him  at [phone].    Thanks  Td

## 2023-02-07 ENCOUNTER — HOSPITAL ENCOUNTER (OUTPATIENT)
Dept: RADIOLOGY | Facility: HOSPITAL | Age: 78
Discharge: HOME | End: 2023-02-07
Attending: PHYSICIAN ASSISTANT
Payer: MEDICARE

## 2023-02-07 DIAGNOSIS — M54.16 LUMBAR RADICULOPATHY, CHRONIC: ICD-10-CM

## 2023-02-07 DIAGNOSIS — M54.2 CERVICALGIA: ICD-10-CM

## 2023-02-07 PROCEDURE — 72125 CT NECK SPINE W/O DYE: CPT | Mod: 26,,, | Performed by: RADIOLOGY

## 2023-02-07 PROCEDURE — 72131 CT LUMBAR SPINE W/O DYE: CPT | Mod: TC

## 2023-02-07 PROCEDURE — 72125 CT NECK SPINE W/O DYE: CPT | Mod: TC

## 2023-02-07 PROCEDURE — 72131 CT LUMBAR SPINE WITHOUT CONTRAST: ICD-10-PCS | Mod: 26,,, | Performed by: RADIOLOGY

## 2023-02-07 PROCEDURE — 72131 CT LUMBAR SPINE W/O DYE: CPT | Mod: 26,,, | Performed by: RADIOLOGY

## 2023-02-07 PROCEDURE — 72125 CT CERVICAL SPINE WITHOUT CONTRAST: ICD-10-PCS | Mod: 26,,, | Performed by: RADIOLOGY

## 2023-02-17 ENCOUNTER — TELEPHONE (OUTPATIENT)
Dept: ELECTROPHYSIOLOGY | Facility: CLINIC | Age: 78
End: 2023-02-17
Payer: MEDICARE

## 2023-02-17 ENCOUNTER — TELEPHONE (OUTPATIENT)
Dept: CARDIOLOGY | Facility: CLINIC | Age: 78
End: 2023-02-17
Payer: MEDICARE

## 2023-02-17 ENCOUNTER — NURSE TRIAGE (OUTPATIENT)
Dept: ADMINISTRATIVE | Facility: CLINIC | Age: 78
End: 2023-02-17
Payer: MEDICARE

## 2023-02-17 NOTE — TELEPHONE ENCOUNTER
I returned call to pt. She says she had a question about changing her patches but does not have her papers with her. She says she spoke with Jeanie and will call back once she has everything she needs together.       ----- Message from Jamal Bernstein sent at 2/17/2023  9:09 AM CST -----  Contact: [phone]  Patient requesting a call back in regards to a Holter monitor. Please call back at [phone]. Thanks KD

## 2023-02-18 NOTE — TELEPHONE ENCOUNTER
Patient states she would call to cardiology on Monday because she realized she would have to do the connections congruent with a telephonic device. She states she realized she was told if she became symptomatic she should call [facility] triage nurses.    Reason for Disposition   [1] Caller requesting NON-URGENT health information AND [2] PCP's office is the best resource    Additional Information   Negative: [1] Caller is not with the adult (patient) AND [2] reporting urgent symptoms   Negative: Lab result questions   Negative: Medication questions   Negative: Caller can't be reached by phone   Negative: Caller has already spoken to PCP or another triager   Negative: RN needs further essential information from caller in order to complete triage   Negative: Requesting regular office appointment    Protocols used: Information Only Call-A-

## 2023-02-22 DIAGNOSIS — I25.118 CORONARY ARTERY DISEASE OF NATIVE ARTERY OF NATIVE HEART WITH STABLE ANGINA PECTORIS: ICD-10-CM

## 2023-02-22 RX ORDER — CLOPIDOGREL BISULFATE 75 MG/1
75 TABLET ORAL DAILY
Qty: 90 TABLET | Refills: 3 | Status: SHIPPED | OUTPATIENT
Start: 2023-02-22

## 2023-02-22 NOTE — TELEPHONE ENCOUNTER
Care Due:                  Date            Visit Type   Department     Provider  --------------------------------------------------------------------------------                                EP -                              PRIMARY      [facility] INTERNAL  Last Visit: 05-      CARE ([facility])   [facility]       Laron Chaudhari                              EP -                              PRIMARY      [facility] INTERNAL  Next Visit: 03-      CARE ([facility])   [facility]       Laron Chaudhari                                                            Last  Test          Frequency    Reason                     Performed    Due Date  --------------------------------------------------------------------------------    HBA1C.......  6 months...  metFORMIN................  10-   04-    [employer] Embedded Care Gaps. Reference number: [id number]. 2/22/2023   10:37:19 AM CST

## 2023-02-23 LAB
OHS CV HOLTER SINUS AVERAGE HR: 73
OHS CV HOLTER SINUS MAX HR: 117
OHS CV HOLTER SINUS MIN HR: 61

## 2023-02-24 ENCOUNTER — TELEPHONE (OUTPATIENT)
Dept: INTERNAL MEDICINE | Facility: CLINIC | Age: 78
End: 2023-02-24
Payer: MEDICARE

## 2023-02-24 ENCOUNTER — OFFICE VISIT (OUTPATIENT)
Dept: PHYSICAL MEDICINE AND REHAB | Facility: CLINIC | Age: 78
End: 2023-02-24
Payer: MEDICARE

## 2023-02-24 VITALS
HEART RATE: 66 BPM | RESPIRATION RATE: 13 BRPM | WEIGHT: 121 LBS | DIASTOLIC BLOOD PRESSURE: 69 MMHG | BODY MASS INDEX: 20.66 KG/M2 | SYSTOLIC BLOOD PRESSURE: 174 MMHG | HEIGHT: 64 IN

## 2023-02-24 DIAGNOSIS — M54.12 CERVICAL RADICULOPATHY: ICD-10-CM

## 2023-02-24 DIAGNOSIS — G56.03 BILATERAL CARPAL TUNNEL SYNDROME: Primary | ICD-10-CM

## 2023-02-24 DIAGNOSIS — G56.21 CUBITAL TUNNEL SYNDROME, RIGHT: ICD-10-CM

## 2023-02-24 PROCEDURE — 99499 NO LOS: ICD-10-PCS | Mod: S$GLB,,, | Performed by: PHYSICAL MEDICINE & REHABILITATION

## 2023-02-24 PROCEDURE — 3077F PR MOST RECENT SYSTOLIC BLOOD PRESSURE >= 140 MM HG: ICD-10-PCS | Mod: CPTII,S$GLB,, | Performed by: PHYSICAL MEDICINE & REHABILITATION

## 2023-02-24 PROCEDURE — 95886 PR EMG COMPLETE, W/ NERVE CONDUCTION STUDIES, 5+ MUSCLES: ICD-10-PCS | Mod: S$GLB,,, | Performed by: PHYSICAL MEDICINE & REHABILITATION

## 2023-02-24 PROCEDURE — 99499 UNLISTED E&M SERVICE: CPT | Mod: S$GLB,,, | Performed by: PHYSICAL MEDICINE & REHABILITATION

## 2023-02-24 PROCEDURE — 1125F AMNT PAIN NOTED PAIN PRSNT: CPT | Mod: CPTII,S$GLB,, | Performed by: PHYSICAL MEDICINE & REHABILITATION

## 2023-02-24 PROCEDURE — 3078F DIAST BP <80 MM HG: CPT | Mod: CPTII,S$GLB,, | Performed by: PHYSICAL MEDICINE & REHABILITATION

## 2023-02-24 PROCEDURE — 3078F PR MOST RECENT DIASTOLIC BLOOD PRESSURE < 80 MM HG: ICD-10-PCS | Mod: CPTII,S$GLB,, | Performed by: PHYSICAL MEDICINE & REHABILITATION

## 2023-02-24 PROCEDURE — 99999 PR PBB SHADOW E&M-EST. PATIENT-LVL III: CPT | Mod: PBBFAC,,, | Performed by: PHYSICAL MEDICINE & REHABILITATION

## 2023-02-24 PROCEDURE — 1160F RVW MEDS BY RX/DR IN RCRD: CPT | Mod: CPTII,S$GLB,, | Performed by: PHYSICAL MEDICINE & REHABILITATION

## 2023-02-24 PROCEDURE — 95911 PR NERVE CONDUCTION STUDY; 9-10 STUDIES: ICD-10-PCS | Mod: S$GLB,,, | Performed by: PHYSICAL MEDICINE & REHABILITATION

## 2023-02-24 PROCEDURE — 99999 PR PBB SHADOW E&M-EST. PATIENT-LVL III: ICD-10-PCS | Mod: PBBFAC,,, | Performed by: PHYSICAL MEDICINE & REHABILITATION

## 2023-02-24 PROCEDURE — 95911 NRV CNDJ TEST 9-10 STUDIES: CPT | Mod: S$GLB,,, | Performed by: PHYSICAL MEDICINE & REHABILITATION

## 2023-02-24 PROCEDURE — 1125F PR PAIN SEVERITY QUANTIFIED, PAIN PRESENT: ICD-10-PCS | Mod: CPTII,S$GLB,, | Performed by: PHYSICAL MEDICINE & REHABILITATION

## 2023-02-24 PROCEDURE — 3077F SYST BP >= 140 MM HG: CPT | Mod: CPTII,S$GLB,, | Performed by: PHYSICAL MEDICINE & REHABILITATION

## 2023-02-24 PROCEDURE — 1159F PR MEDICATION LIST DOCUMENTED IN MEDICAL RECORD: ICD-10-PCS | Mod: CPTII,S$GLB,, | Performed by: PHYSICAL MEDICINE & REHABILITATION

## 2023-02-24 PROCEDURE — 1160F PR REVIEW ALL MEDS BY PRESCRIBER/CLIN PHARMACIST DOCUMENTED: ICD-10-PCS | Mod: CPTII,S$GLB,, | Performed by: PHYSICAL MEDICINE & REHABILITATION

## 2023-02-24 PROCEDURE — 95886 MUSC TEST DONE W/N TEST COMP: CPT | Mod: S$GLB,,, | Performed by: PHYSICAL MEDICINE & REHABILITATION

## 2023-02-24 PROCEDURE — 1159F MED LIST DOCD IN RCRD: CPT | Mod: CPTII,S$GLB,, | Performed by: PHYSICAL MEDICINE & REHABILITATION

## 2023-02-24 NOTE — PROGRESS NOTES
[facility]   [address]  [place] [address]  Phone: [phone]        Full Name: Shireen Felix YOB: 1945  Patient ID: [id number]      Visit Date: 2/24/2023 10:50  Age: 77 Years  Examining Physician: Dr Stevens  Referring Physician: EVAN Camacho  Conclusion: upper ext  Chief Complaint   Patient presents with    Neck Pain     Into arms       HPI: This is a 77 y.o.  female being seen in clinic today for re-evaluation of chronic neck, shoulder, arm pain with numbness/tingling into her arms-worse on the left.  With certain positions her numbness worsens.  She was last seen in 2021 for EMG-dx with cervical radic, CTS, and cubital tunnel syndrome.  Pt reports fall on Monday with persistent pain and right lower chest/ribcage.   She denies sob but has pain with deep breath    History obtained from patient    Past family, medical, social, and surgical history reviewed in chart    Review of Systems:     General- denies lethargy, weight change, fever, chills  Head/neck- denies swallowing difficulties  ENT- denies hearing changes  Cardiovascular-denies chest pain  Pulmonary- denies shortness of breath  GI- denies constipation or bowel incontinence  - denies bladder incontinence  Skin- denies wounds or rashes  Musculoskeletal- +weakness, +pain  Neurologic- +numbness and tingling  Psychiatric- denies depressive or psychotic features, denies anxiety  Lymphatic-denies swelling  Endocrine- denies hypoglycemic symptoms/+DM history  All other pertinent systems negative     Physical Examination:  General: Well developed, well nourished female, NAD  HEENT:NCAT EOMI bilaterally   Pulmonary:Normal respirations    Spinal Examination: CERVICAL  Active ROM is within normal limits.  Inspection: No deformity of spinal alignment.    Spinal Examination: LUMBAR or THORACIC  Active ROM is limited in all planes  Inspection: No deformity of spinal alignment.      Musculoskeletal Tests:  Phalen:   Elbow  compression (ulnar): mild +bilaterally  Tinels at wrist: +    Bilateral Upper and Lower Extremities:  Pulses are 2+ at radial bilaterally.  Shoulder/Elbow/Wrist/Hand ROM wnl, no area of ecchymosis over right ribcage, ttp at lower ribcage on right  Hip/Knee/Ankle ROM wnl  Bilateral Extremities show normal capillary refill.  No signs of cyanosis, rubor, edema, skin changes, or dysvascular changes of appendages.  Nails appear intact.    Neurological Exam:  Cranial Nerves:  II-XII grossly intact    Manual Muscle Testing: (Motor 5=normal)  4/5 strength bilateral upper extremities    No focal atrophy is noted of either upper extremity.    Bilateral Reflexes:  Thompson's response is absent bilaterally.      Sensation: tested to light touch  - intact in arms     Gait: Narrow base and good arm swing.      Entire procedure explained to patient prior to proceeding.  Verbal consent obtained     Sensory NCS      Nerve / Sites Rec. Site Onset Lat Peak Lat NP Amp PP Amp Segments Distance Velocity     ms ms µV µV  mm m/s   R Median - Digit II (Antidromic)      Wrist Dig II 4.10 5.27 15.1 11.2 Wrist - Dig  34   L Median - Digit II (Antidromic)      Wrist Dig II 4.06 5.52 11.9 19.6 Wrist - Dig  34   R Ulnar - Digit V (Antidromic)      Wrist Dig V 2.94 3.88 11.6 6.6 Wrist - Dig V 140 48   L Ulnar - Digit V (Antidromic)      Wrist Dig V 2.83 3.52 11.1 11.1 Wrist - Dig V 140 49   R Radial - Anatomical snuff box (Forearm)      Forearm Wrist 1.88 2.52 14.0 3.9 Forearm - Wrist 100 53   L Radial - Anatomical snuff box (Forearm)      Forearm Wrist 1.75 2.44 13.8 15.5 Forearm - Wrist 100 57                   Motor NCS      Nerve / Sites Muscle Latency Amplitude Amp % Duration Segments Distance Lat Diff Velocity     ms mV % ms  mm ms m/s   R Median - APB      Wrist APB 5.13 6.3 100 6.08 Wrist - APB 80        Elbow APB 9.19 6.0 95.9 6.31 Elbow - Wrist 210 4.06 52   L Median - APB      Wrist APB 5.75 2.9 100 6.46 Wrist - APB 80         Elbow APB 10.56 2.6 90.6 6.08 Elbow - Wrist 230 4.81 48   R Ulnar - ADM      Wrist ADM 2.73 5.5 100 6.15 Wrist - ADM 80        B.Elbow ADM 6.44 3.8 68.4 7.79 B.Elbow - Wrist 200 3.71 54      A.Elbow ADM 8.98 3.0 55.1 7.71 A.Elbow - B.Elbow 95 2.54 37   L Ulnar - ADM      Wrist ADM 3.31 7.6 100 5.29 Wrist - ADM 80        B.Elbow ADM 6.92 7.8 102 5.46 B.Elbow - Wrist 200 3.60 55      A.Elbow ADM 8.81 6.7 87.2 5.81 A.Elbow - B.Elbow 100 1.90 53               EMG Summary Table     Spontaneous MUAP Recruitment   Muscle Nerve Roots IA Fib PSW Fasc H.F. Amp Dur. PPP Pattern   L. Trapezius (upper) Accessory (spinal) C3-C4 N None None None None N N N N   L. Deltoid Axillary C5-C6 N None None None None N N 1+ 1+   L. Biceps brachii Musculocutaneous C5-C6 1+ 1+ None None None N N 1+ 1+   L. Triceps brachii Radial C6-C8 N None None None None N N 1+ 1+   L. Pronator teres Median C6-C7 1+ None None None None N N 1+ 1+   L. First dorsal interosseous Ulnar C8-T1 N None None None None 1+ N 1+ 1+   L. Abductor pollicis brevis Median C8-T1 1+ None 2+ None None N 1+ 1+ Reduced   R. First dorsal interosseous Ulnar C8-T1 N None None None None 1+ 1+ 1+ Reduced   R. Abductor pollicis brevis Median C8-T1 N None None None None N 1+ 1+ Reduced   R. Biceps brachii Musculocutaneous C5-C6 N None None None None N N 1+ 1+   R. Deltoid Axillary C5-C6 1+ None None None None N N 1+ Reduced   R. Trapezius (upper) Accessory (spinal) C3-C4 1+ None None None None N N 1+ Reduced           INTERPRETATION  -Bilateral median motor nerve conduction study showed prolonged latency, dec amplitude on the left and dec conduction velocity on the left  -Bilateral median sensory nerve conduction study showed prolonged peak latency and normal  amplitude  -Bilateral ulnar motor nerve conduction study showed prolonged latency, dec prox amplitude on the right and dec conduction velocity across the right elbow  -Bilateral ulnar sensory nerve conduction study showed normal  peak latency and amplitude on the right  -Bilateral radial sensory nerve conduction study showed prolonged peak latency and normal amplitude  -Needle EMG examination performed to above mentioned muscles       IMPRESSION  1. ABNORMAL study  2. 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  There is an acute on chronic radiculopathy of the LEFT C6 nerve root and a subacute on chronic of the RIGHT C4 and C5 nerve roots and a CHRONIC radiculopathy of bilateral C4-T1 nerve roots    PLAN  1. Follow up with referring provider: Alanis Peres  2. Handouts on CTS, Cubital tunnel, cervical radic provided. Rec fu with IPM, fu with ortho concerning CTS and cubital tunnel. PT also advised to go to urgent care for PCP for post-fall check with persistent right rib pain/possible rib fx after fall  3. This study is good for one year. If symptoms worsen or do not improve, please re-consult.    Elva Stevens M.D.  Physical Medicine and Rehab

## 2023-02-24 NOTE — TELEPHONE ENCOUNTER
----- Message from Kevin Lyons sent at 2/24/2023  9:48 AM CST -----  Contact: Patient  Shireen Felix would like a call back at [phone], in regards to her having a fall. Pt would like to know if she can have a chest X-ray ordered, to look at her ribs. She would like to have it done on today at the [facility] or [facility] location.

## 2023-02-24 NOTE — TELEPHONE ENCOUNTER
----- Message from Patricia Garcia sent at 2/24/2023 11:26 AM CST -----  Pt stated she fell on Monday and would like an order put in to have a xray of her ribs. Call back number is [phone]. Thx. E;

## 2023-02-24 NOTE — TELEPHONE ENCOUNTER
Tried contacting pt to schedule appt to be seen. Pt did not answer, was not able to leave a message./DT

## 2023-02-27 ENCOUNTER — TELEPHONE (OUTPATIENT)
Dept: NEUROLOGY | Facility: CLINIC | Age: 78
End: 2023-02-27
Payer: MEDICARE

## 2023-02-27 ENCOUNTER — TELEPHONE (OUTPATIENT)
Dept: CARDIOLOGY | Facility: CLINIC | Age: 78
End: 2023-02-27
Payer: MEDICARE

## 2023-02-27 NOTE — TELEPHONE ENCOUNTER
----- Message from Pauline Hsieh sent at 2/27/2023 11:47 AM CST -----  Type:  Sooner Apoointment Request    Caller is requesting a sooner appointment.  Caller declined first available appointment listed below.  Caller will not accept being placed on the waitlist and is requesting a message be sent to doctor.  Name of Caller:pt  When is the first available appointment?4/23  Symptoms:Patient fell hit head on concrete 2-11-23, and had a seizure shortly after  Would the patient rather a call back or a response via [facility]? call  Best Call Back Number:[phone]   Additional Information:

## 2023-02-27 NOTE — TELEPHONE ENCOUNTER
Pt states that she will send the monitor back in the box provided. Advised that she will need to contact the company that she did not receive a . Pt verbalized understanding pb      ----- Message from Mone Chu sent at 2/27/2023  9:31 AM CST -----  Contact: jETTY  Patient stated  that she needs to send equipment in and patch, says she didn't get the  back,  Please call her back at [phone]

## 2023-03-02 ENCOUNTER — TELEPHONE (OUTPATIENT)
Dept: PAIN MEDICINE | Facility: CLINIC | Age: 78
End: 2023-03-02
Payer: MEDICARE

## 2023-03-02 NOTE — TELEPHONE ENCOUNTER
----- Message from Neymar House sent at 3/2/2023  9:10 AM CST -----  Contact: self  ..Type:  Sooner Apoointment Request    Caller is requesting a sooner appointment.  Caller declined first available appointment listed below.  Caller will not accept being placed on the waitlist and is requesting a message be sent to doctor.  Name of Caller: ..Shireen Felix  When is the first available appointment? 03/09  Symptoms:hand pain   Would the patient rather a call back or a response via [facility]?  Call back   Best Call Back Number: .[phone] (home)   Additional Information:  pt states she has an appt at the [facility] tomorrow if she can be squeezed in that would be great

## 2023-03-02 NOTE — TELEPHONE ENCOUNTER
Pt states she cannot see  until the end of the month, notified we do not have any providers at [facility] on Friday.     Pt scheduled for 3/9/23.   EMG 2/24/23  MRI 2/7/23

## 2023-03-03 ENCOUNTER — OFFICE VISIT (OUTPATIENT)
Dept: INTERNAL MEDICINE | Facility: CLINIC | Age: 78
End: 2023-03-03
Payer: MEDICARE

## 2023-03-03 VITALS
BODY MASS INDEX: 22.29 KG/M2 | DIASTOLIC BLOOD PRESSURE: 58 MMHG | WEIGHT: 129.88 LBS | HEART RATE: 58 BPM | SYSTOLIC BLOOD PRESSURE: 130 MMHG | TEMPERATURE: 97 F | OXYGEN SATURATION: 98 %

## 2023-03-03 DIAGNOSIS — Z98.890 HISTORY OF CEREBRAL ANEURYSM REPAIR: ICD-10-CM

## 2023-03-03 DIAGNOSIS — F32.A DEPRESSION, UNSPECIFIED DEPRESSION TYPE: ICD-10-CM

## 2023-03-03 DIAGNOSIS — Z86.79 HISTORY OF CEREBRAL ANEURYSM REPAIR: ICD-10-CM

## 2023-03-03 DIAGNOSIS — M51.34 DDD (DEGENERATIVE DISC DISEASE), THORACIC: ICD-10-CM

## 2023-03-03 DIAGNOSIS — F33.1 MODERATE RECURRENT MAJOR DEPRESSION: ICD-10-CM

## 2023-03-03 DIAGNOSIS — E11.22 CONTROLLED TYPE 2 DIABETES MELLITUS WITH STAGE 3 CHRONIC KIDNEY DISEASE, WITHOUT LONG-TERM CURRENT USE OF INSULIN: Primary | ICD-10-CM

## 2023-03-03 DIAGNOSIS — J45.40 MODERATE PERSISTENT ASTHMA WITHOUT COMPLICATION: ICD-10-CM

## 2023-03-03 DIAGNOSIS — Z98.890 HX OF CRANIOTOMY: ICD-10-CM

## 2023-03-03 DIAGNOSIS — I10 PRIMARY HYPERTENSION: ICD-10-CM

## 2023-03-03 DIAGNOSIS — N18.30 CONTROLLED TYPE 2 DIABETES MELLITUS WITH STAGE 3 CHRONIC KIDNEY DISEASE, WITHOUT LONG-TERM CURRENT USE OF INSULIN: Primary | ICD-10-CM

## 2023-03-03 DIAGNOSIS — E87.1 HYPONATREMIA: ICD-10-CM

## 2023-03-03 DIAGNOSIS — M54.12 CERVICAL RADICULOPATHY: ICD-10-CM

## 2023-03-03 DIAGNOSIS — I50.32 CHRONIC DIASTOLIC HEART FAILURE: ICD-10-CM

## 2023-03-03 DIAGNOSIS — G56.03 BILATERAL CARPAL TUNNEL SYNDROME: ICD-10-CM

## 2023-03-03 DIAGNOSIS — J41.1 MUCOPURULENT CHRONIC BRONCHITIS: ICD-10-CM

## 2023-03-03 DIAGNOSIS — M46.1 SACROILIITIS, NOT ELSEWHERE CLASSIFIED: ICD-10-CM

## 2023-03-03 DIAGNOSIS — K21.9 GASTROESOPHAGEAL REFLUX DISEASE, UNSPECIFIED WHETHER ESOPHAGITIS PRESENT: ICD-10-CM

## 2023-03-03 DIAGNOSIS — E44.1 MILD PROTEIN-CALORIE MALNUTRITION: ICD-10-CM

## 2023-03-03 DIAGNOSIS — M51.36 DDD (DEGENERATIVE DISC DISEASE), LUMBAR: ICD-10-CM

## 2023-03-03 DIAGNOSIS — Z87.891 HISTORY OF TOBACCO ABUSE: ICD-10-CM

## 2023-03-03 DIAGNOSIS — G89.4 CHRONIC PAIN SYNDROME: ICD-10-CM

## 2023-03-03 DIAGNOSIS — M51.37 DDD (DEGENERATIVE DISC DISEASE), LUMBOSACRAL: ICD-10-CM

## 2023-03-03 DIAGNOSIS — G40.109 TEMPORAL LOBE EPILEPSY: ICD-10-CM

## 2023-03-03 DIAGNOSIS — I25.118 CORONARY ARTERY DISEASE OF NATIVE ARTERY OF NATIVE HEART WITH STABLE ANGINA PECTORIS: ICD-10-CM

## 2023-03-03 PROBLEM — I15.2 HYPERTENSION ASSOCIATED WITH DIABETES: Status: RESOLVED | Noted: 2019-12-23 | Resolved: 2023-03-03

## 2023-03-03 PROBLEM — E11.59 HYPERTENSION ASSOCIATED WITH DIABETES: Status: RESOLVED | Noted: 2019-12-23 | Resolved: 2023-03-03

## 2023-03-03 PROCEDURE — 1100F PR PT FALLS ASSESS DOC 2+ FALLS/FALL W/INJURY/YR: ICD-10-PCS | Mod: CPTII,S$GLB,, | Performed by: PEDIATRICS

## 2023-03-03 PROCEDURE — 3288F FALL RISK ASSESSMENT DOCD: CPT | Mod: CPTII,S$GLB,, | Performed by: PEDIATRICS

## 2023-03-03 PROCEDURE — 99999 PR PBB SHADOW E&M-EST. PATIENT-LVL V: CPT | Mod: PBBFAC,,, | Performed by: PEDIATRICS

## 2023-03-03 PROCEDURE — 1159F PR MEDICATION LIST DOCUMENTED IN MEDICAL RECORD: ICD-10-PCS | Mod: CPTII,S$GLB,, | Performed by: PEDIATRICS

## 2023-03-03 PROCEDURE — 3288F PR FALLS RISK ASSESSMENT DOCUMENTED: ICD-10-PCS | Mod: CPTII,S$GLB,, | Performed by: PEDIATRICS

## 2023-03-03 PROCEDURE — 99999 PR PBB SHADOW E&M-EST. PATIENT-LVL V: ICD-10-PCS | Mod: PBBFAC,,, | Performed by: PEDIATRICS

## 2023-03-03 PROCEDURE — 1160F RVW MEDS BY RX/DR IN RCRD: CPT | Mod: CPTII,S$GLB,, | Performed by: PEDIATRICS

## 2023-03-03 PROCEDURE — 99214 PR OFFICE/OUTPT VISIT, EST, LEVL IV, 30-39 MIN: ICD-10-PCS | Mod: S$GLB,,, | Performed by: PEDIATRICS

## 2023-03-03 PROCEDURE — 99214 OFFICE O/P EST MOD 30 MIN: CPT | Mod: S$GLB,,, | Performed by: PEDIATRICS

## 2023-03-03 PROCEDURE — 3078F PR MOST RECENT DIASTOLIC BLOOD PRESSURE < 80 MM HG: ICD-10-PCS | Mod: CPTII,S$GLB,, | Performed by: PEDIATRICS

## 2023-03-03 PROCEDURE — 1100F PTFALLS ASSESS-DOCD GE2>/YR: CPT | Mod: CPTII,S$GLB,, | Performed by: PEDIATRICS

## 2023-03-03 PROCEDURE — 1125F PR PAIN SEVERITY QUANTIFIED, PAIN PRESENT: ICD-10-PCS | Mod: CPTII,S$GLB,, | Performed by: PEDIATRICS

## 2023-03-03 PROCEDURE — 3075F SYST BP GE 130 - 139MM HG: CPT | Mod: CPTII,S$GLB,, | Performed by: PEDIATRICS

## 2023-03-03 PROCEDURE — 1159F MED LIST DOCD IN RCRD: CPT | Mod: CPTII,S$GLB,, | Performed by: PEDIATRICS

## 2023-03-03 PROCEDURE — 3078F DIAST BP <80 MM HG: CPT | Mod: CPTII,S$GLB,, | Performed by: PEDIATRICS

## 2023-03-03 PROCEDURE — 1125F AMNT PAIN NOTED PAIN PRSNT: CPT | Mod: CPTII,S$GLB,, | Performed by: PEDIATRICS

## 2023-03-03 PROCEDURE — 1160F PR REVIEW ALL MEDS BY PRESCRIBER/CLIN PHARMACIST DOCUMENTED: ICD-10-PCS | Mod: CPTII,S$GLB,, | Performed by: PEDIATRICS

## 2023-03-03 PROCEDURE — 3075F PR MOST RECENT SYSTOLIC BLOOD PRESS GE 130-139MM HG: ICD-10-PCS | Mod: CPTII,S$GLB,, | Performed by: PEDIATRICS

## 2023-03-03 NOTE — PROGRESS NOTES
Subjective:       Patient ID: Shireen Felix is a 77 y.o. female.    Chief Complaint: No chief complaint on file.    Shireen Felix is a 77 y.o. female who presents to clinic for a follow up.     1) DM: No BS checks. No hyper/hypoglycemic symptoms. Following D&E. Going to gym.   2) Seizures: none in >3mo, followed by Neurology  3) Hx CVA: stable. Finished PT,  Now walking and no assistance with ambulation and continued fall risk. Living at her own home with close brother support, followed by Neurology  4) CAD/CHF with diastolic dysfunction: no CP, followed Cardiology, has chronic SOB and TURCIOS unable to differaitiate between COPD  5) COPD/asthma: stable SOB/TURCIOS and coughing, followed by Pulm  6) GERD: quiet  7) Chronic pain and DJD: Always symptomatic, overall doing well despite daily pain, off narcotics. Is seeing PMR.   8) depression: off Lexapro, is mildly symptomatic and anxious but she does not feel the need to go on medications   9) protein calorie malnutrition: pt has made effort to eat regularly, weight is up and stable    LABS REVIEWED AND DISCUSSED WITH PATIENT: CMP, lipid panel, and A1C.     Review of Systems   Constitutional:  Negative for activity change, appetite change, chills, diaphoresis, fatigue, fever and unexpected weight change.   HENT:  Negative for nasal congestion, ear pain, mouth sores, nosebleeds, postnasal drip, rhinorrhea, sneezing and sore throat.    Eyes:  Negative for photophobia, pain, discharge, redness and visual disturbance.   Respiratory:  Negative for cough, chest tightness, shortness of breath, wheezing and stridor.    Cardiovascular:  Negative for chest pain, palpitations and leg swelling.   Gastrointestinal:  Negative for constipation, diarrhea, nausea and vomiting.   Genitourinary:  Negative for decreased urine volume, difficulty urinating, dysuria, flank pain, frequency, hematuria and urgency.   Musculoskeletal:  Negative for arthralgias, back pain, joint swelling,  neck pain and neck stiffness.   Integumentary:  Negative for color change and rash.        chemical burn from adhesive patch on R lower back   Neurological:  Negative for dizziness, syncope, speech difficulty, weakness, light-headedness and headaches.   Hematological:  Negative for adenopathy. Does not bruise/bleed easily.   Psychiatric/Behavioral:  Negative for confusion, decreased concentration, dysphoric mood, hallucinations, sleep disturbance and suicidal ideas. The patient is not nervous/anxious.    All other systems reviewed and are negative.      Objective:      Physical Exam  Vitals and nursing note reviewed.   Constitutional:       General: She is not in acute distress.     Appearance: She is well-developed.   Neck:      Thyroid: No thyromegaly.      Vascular: No JVD.   Cardiovascular:      Rate and Rhythm: Normal rate and regular rhythm.      Heart sounds: Normal heart sounds. No murmur heard.  Pulmonary:      Effort: Pulmonary effort is normal. No respiratory distress.      Breath sounds: Normal breath sounds. No wheezing or rales.   Abdominal:      General: There is no distension.      Palpations: Abdomen is soft. There is no mass.      Tenderness: There is no abdominal tenderness. There is no guarding.   Musculoskeletal:      Right lower leg: No edema.      Left lower leg: No edema.   Lymphadenopathy:      Cervical: No cervical adenopathy.   Skin:     Capillary Refill: Capillary refill takes less than 2 seconds.      Findings: No rash.   Neurological:      General: No focal deficit present.      Mental Status: She is alert and oriented to person, place, and time.      Cranial Nerves: No cranial nerve deficit.      Coordination: Coordination normal.   Psychiatric:         Mood and Affect: Mood normal.         Behavior: Behavior normal.         Thought Content: Thought content normal.         Judgment: Judgment normal.       Assessment:       Problem List Items Addressed This Visit       Mucopurulent  chronic bronchitis (Chronic)    Bilateral carpal tunnel syndrome    Relevant Orders    Ambulatory referral/consult to Physical/Occupational Therapy    Cervical radiculopathy    Relevant Orders    Ambulatory referral/consult to Physical/Occupational Therapy    Chronic diastolic heart failure    Chronic pain syndrome    Coronary artery disease of native artery of native heart with stable angina pectoris    DDD (degenerative disc disease), lumbar    DDD (degenerative disc disease), lumbosacral    DDD (degenerative disc disease), thoracic    Depression    DM (diabetes mellitus) type II controlled with renal manifestation - Primary    Relevant Orders    Comprehensive Metabolic Panel    Lipid Panel    Microalbumin/Creatinine Ratio, Urine    Hemoglobin A1C    GERD (gastroesophageal reflux disease)    History of cerebral aneurysm repair    History of tobacco abuse    HTN (hypertension)    Hx of craniotomy    Hyponatremia    Mild protein-calorie malnutrition    Moderate persistent asthma without complication    Moderate recurrent major depression    Sacroiliitis, not elsewhere classified    Temporal lobe epilepsy       Plan:     Controlled type 2 diabetes mellitus with stage 3 chronic kidney disease, without long-term current use of insulin  -     Comprehensive Metabolic Panel; Future; Expected date: 03/03/2023  -     Lipid Panel; Future; Expected date: 03/03/2023  -     Microalbumin/Creatinine Ratio, Urine; Future; Expected date: 03/03/2023  -     Hemoglobin A1C; Future; Expected date: 03/03/2023    Primary hypertension    Hx of craniotomy    Depression, unspecified depression type    Coronary artery disease of native artery of native heart with stable angina pectoris    Chronic diastolic heart failure    Cervical radiculopathy  -     Ambulatory referral/consult to Physical/Occupational Therapy; Future; Expected date: 03/10/2023    Bilateral carpal tunnel syndrome  -     Ambulatory referral/consult to Physical/Occupational  Therapy; Future; Expected date: 03/10/2023    Chronic pain syndrome    DDD (degenerative disc disease), lumbar    DDD (degenerative disc disease), lumbosacral    DDD (degenerative disc disease), thoracic    Gastroesophageal reflux disease, unspecified whether esophagitis present    History of cerebral aneurysm repair    History of tobacco abuse    Hyponatremia    Moderate persistent asthma without complication    Mild protein-calorie malnutrition    Sacroiliitis, not elsewhere classified    Moderate recurrent major depression    Temporal lobe epilepsy    Mucopurulent chronic bronchitis      Overall patient is stable in DM, HTN, lipids Maintain meds and subspecialty care. I have encouraged her to attend health club programs, start OT for shoulders and CTS- sees Dr Navarro shortly.  F/U 6 months with labs.    Scribe Attestation:   I, Ben Espinoza, am scribing for, and in the presence of, Dr. Laron Chaudhari Jr. I performed the above scribed service and the documentation accurately describes the services I performed. I attest to the accuracy of the note.    I, Dr. Laron Chaudhari Jr, reviewed documentation as scribed above. I personally performed the services described in this documentation.  I agree that the record reflects my personal performance and is accurate and complete. Laron Chaudhari Jr., MD.  03/03/2023

## 2023-03-09 ENCOUNTER — TELEPHONE (OUTPATIENT)
Dept: INTERNAL MEDICINE | Facility: CLINIC | Age: 78
End: 2023-03-09
Payer: MEDICARE

## 2023-03-09 ENCOUNTER — OFFICE VISIT (OUTPATIENT)
Dept: PAIN MEDICINE | Facility: CLINIC | Age: 78
End: 2023-03-09
Payer: MEDICARE

## 2023-03-09 VITALS
HEIGHT: 64 IN | SYSTOLIC BLOOD PRESSURE: 156 MMHG | RESPIRATION RATE: 16 BRPM | DIASTOLIC BLOOD PRESSURE: 72 MMHG | WEIGHT: 129.88 LBS | BODY MASS INDEX: 22.17 KG/M2 | HEART RATE: 66 BPM

## 2023-03-09 DIAGNOSIS — M54.12 CERVICAL RADICULOPATHY: Primary | ICD-10-CM

## 2023-03-09 DIAGNOSIS — M54.16 LUMBAR RADICULOPATHY, CHRONIC: ICD-10-CM

## 2023-03-09 PROCEDURE — 99999 PR PBB SHADOW E&M-EST. PATIENT-LVL V: CPT | Mod: PBBFAC,,, | Performed by: PHYSICIAN ASSISTANT

## 2023-03-09 PROCEDURE — 3078F PR MOST RECENT DIASTOLIC BLOOD PRESSURE < 80 MM HG: ICD-10-PCS | Mod: CPTII,S$GLB,, | Performed by: PHYSICIAN ASSISTANT

## 2023-03-09 PROCEDURE — 1160F RVW MEDS BY RX/DR IN RCRD: CPT | Mod: CPTII,S$GLB,, | Performed by: PHYSICIAN ASSISTANT

## 2023-03-09 PROCEDURE — 1101F PR PT FALLS ASSESS DOC 0-1 FALLS W/OUT INJ PAST YR: ICD-10-PCS | Mod: CPTII,S$GLB,, | Performed by: PHYSICIAN ASSISTANT

## 2023-03-09 PROCEDURE — 3288F PR FALLS RISK ASSESSMENT DOCUMENTED: ICD-10-PCS | Mod: CPTII,S$GLB,, | Performed by: PHYSICIAN ASSISTANT

## 2023-03-09 PROCEDURE — 1125F AMNT PAIN NOTED PAIN PRSNT: CPT | Mod: CPTII,S$GLB,, | Performed by: PHYSICIAN ASSISTANT

## 2023-03-09 PROCEDURE — 99999 PR PBB SHADOW E&M-EST. PATIENT-LVL V: ICD-10-PCS | Mod: PBBFAC,,, | Performed by: PHYSICIAN ASSISTANT

## 2023-03-09 PROCEDURE — 99214 OFFICE O/P EST MOD 30 MIN: CPT | Mod: S$GLB,,, | Performed by: PHYSICIAN ASSISTANT

## 2023-03-09 PROCEDURE — 3077F PR MOST RECENT SYSTOLIC BLOOD PRESSURE >= 140 MM HG: ICD-10-PCS | Mod: CPTII,S$GLB,, | Performed by: PHYSICIAN ASSISTANT

## 2023-03-09 PROCEDURE — 3078F DIAST BP <80 MM HG: CPT | Mod: CPTII,S$GLB,, | Performed by: PHYSICIAN ASSISTANT

## 2023-03-09 PROCEDURE — 1159F PR MEDICATION LIST DOCUMENTED IN MEDICAL RECORD: ICD-10-PCS | Mod: CPTII,S$GLB,, | Performed by: PHYSICIAN ASSISTANT

## 2023-03-09 PROCEDURE — 99214 PR OFFICE/OUTPT VISIT, EST, LEVL IV, 30-39 MIN: ICD-10-PCS | Mod: S$GLB,,, | Performed by: PHYSICIAN ASSISTANT

## 2023-03-09 PROCEDURE — 1101F PT FALLS ASSESS-DOCD LE1/YR: CPT | Mod: CPTII,S$GLB,, | Performed by: PHYSICIAN ASSISTANT

## 2023-03-09 PROCEDURE — 1159F MED LIST DOCD IN RCRD: CPT | Mod: CPTII,S$GLB,, | Performed by: PHYSICIAN ASSISTANT

## 2023-03-09 PROCEDURE — 3077F SYST BP >= 140 MM HG: CPT | Mod: CPTII,S$GLB,, | Performed by: PHYSICIAN ASSISTANT

## 2023-03-09 PROCEDURE — 1160F PR REVIEW ALL MEDS BY PRESCRIBER/CLIN PHARMACIST DOCUMENTED: ICD-10-PCS | Mod: CPTII,S$GLB,, | Performed by: PHYSICIAN ASSISTANT

## 2023-03-09 PROCEDURE — 3288F FALL RISK ASSESSMENT DOCD: CPT | Mod: CPTII,S$GLB,, | Performed by: PHYSICIAN ASSISTANT

## 2023-03-09 PROCEDURE — 1125F PR PAIN SEVERITY QUANTIFIED, PAIN PRESENT: ICD-10-PCS | Mod: CPTII,S$GLB,, | Performed by: PHYSICIAN ASSISTANT

## 2023-03-09 NOTE — TELEPHONE ENCOUNTER
----- Message from Jamal Bernstein sent at 3/8/2023  8:49 AM CST -----  Contact: [phone]  [facility] requesting a call back in regards to an update on a refill for the patients diabetes supplies. Please call back at [phone]. Thanks KD

## 2023-03-09 NOTE — Clinical Note
Pain Provider: Josh Patient Name: Shireen Felix MRN: [id number] Case: CERVICAL INTERLAMINAR EPIDURAL STEROID INJECTION Level: C6/7 Anticoagulation: Clopidogrel (length to hold:5 days RxProvider: Isidro  Then 2 weeks later, schedule    Pain Provider: Josh Patient Name: Shireen Felix MRN: [id number] Case: LUMBAR TFESI Level: L4 and L5 Laterality: bilateral Anticoagulation: Clopidogrel (Plavix) Length to hold:5 days Rx Provider: Isidro  4 week follow up with JOSH

## 2023-03-09 NOTE — PROGRESS NOTES
Established Patient Chronic Pain Clinic Visit    Chief Pain Complaint:  Neck Pain   Lumbar Back Pain   Facial Pain     Interval History (3/9/2023):  Shireen Felix presents today for follow-up visit for CT and EMG review.  Patient was last seen on 2/1/2023. At that visit, the plan was to order updated CT of her cervical and lumbar spine as well as EMG of her bilateral upper extremities. Patient reports pain as 7/10 today.  She reports worsening neck pain with radiation into her left arm down to her hand with cramping and burning.  She also reports worsening symptoms down both legs.  interfering with her ability to walk at times due to significant weakness. Has an appointment with Dr. Navarro on 03/21/2023. Patient fell hit head on concrete 2-11-23, and had a seizure shortly after.  She reports repeated falls due to her legs giving out from underneath her unexpectedly. Referred to Neurology.  She is also currently on antibiotics secondary to having 10 teeth pulled    EMG/NCS BUE 02/24/2023  IMPRESSION  1. ABNORMAL study  2. 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  There is an acute on chronic radiculopathy of the LEFT C6 nerve root and a subacute on chronic of the RIGHT C4 and C5 nerve roots and a CHRONIC radiculopathy of bilateral C4-T1 nerve roots    CT cervical spine 02/07/2023  FINDINGS:  There is a mild amount of dextroconvex curvature of the cervical spine.  There is reversal of the normal cervical lordosis.  There is grade 1 anterolisthesis of C3 on C4.  There is grade 1 anterolisthesis of C4 on C5.  There is no fracture.  There are mild degenerative changes between C5 and C7.  There is a mild amount of atherosclerosis.  There is moderate  partial opacification of the right mastoid air cells.     Impression:     1. There is a mild amount of dextroconvex curvature of the cervical spine.  2. There is reversal of the normal cervical lordosis.  3. There is grade 1 anterolisthesis of C3 on C4.  4. There is grade 1 anterolisthesis of C4 on C5.  5. There are mild degenerative changes between C5 and C7.  6. There is a mild amount of atherosclerosis.  7. There is moderate partial opacification of the right mastoid air cells.  All CT scans at this facility use dose modulation, iterative reconstruction, and/or weight base dosing when appropriate to reduce radiation dose when appropriate to reduce radiation dose to as low as reasonably achievable.     CT lumbar spine 02/07/2023  FINDINGS:  Mild levoscoliosis.     Essentially stable L2 compression fracture.  Chronic appearing deformities of the T12 and L1 vertebral bodies with Schmorl's nodes and height loss.  Stable sclerotic focus in the L5 vertebral body.  No acute fracture.     Multilevel vacuum disc phenomenon from L1/L2 through L5/S1.  Near complete loss of disc space height at the L3/L4 and L4/L5 levels.     Multilevel degenerative changes to include disc bulges, facet arthropathy, and ligamentum flavum hypertrophy.     Discs bulge at the L 1/L2 level on the left may abut the traversing nerve root.     Disc bulge and facet hypertrophy, and ligamentum flavum hypertrophy contribute to moderate central canal stenosis at the L2/L3 level.     Partially calcified disc bulge at the L 3/L4 level contributes to severe right and moderate left neural foraminal narrowing.  There is also moderate to severe central canal stenosis at the L3/L4 level.     Moderate to severe central canal stenosis at the L4/L5 level posterior disc bulge abuts the L4/L5 right nerve root.  There is severe left neural foraminal narrowing at the L4/L5 level.     Impression:     Text no acute osseous finding.  Moderate to severe degenerative  changes of the lumbar spine, as above.     Interval History (1/31/2023):  Shireen Felix presents today for follow-up visit.  Patient was last seen on 10/4/2022. At that visit, the plan was to schedule bilateral L3-5 MBB followed 2 weeks later by XANDER.  She canceled these procedures and reports at this time she is not interested in scheduling.  She reports she has a  and goes to the gym 3-4 times per week which includes walking on a treadmill.   Patient reports pain as 7/10 today. She reports worsening neck pain with radiation into her left arm down to her hand with cramping and burning. This has been severe over the past week. Last cervical CT in 2019, she reports she has sustained multiple falls since then. She also reports worsening symptoms down her left leg, interfering with her ability to walk at times due to significant weakness.      Interval history 10/04/2022  Ms. Felix is a 76-year-old female with past medical history significant for cerebral aneurysm status post craniotomy and repair, cerebral vascular accident, left V1 distribution post herpetic neuralgia, depression, glaucoma, asthma/COPD, coronary artery disease, GERD, nicotine dependence, type 2 diabetes who presents to establish care, previous Dr. Dias and Dr. Fulton patient.  Today patient reports pain in the lower back and legs.  Pain is constant today is rated 7/10.  Patient reports pain in a bandlike distribution in the lower back which radiates down the posterior aspects of bilateral lower extremities in L5-S1 distribution to mid thigh.  Pain is described as burning and aching in nature.  Pain is exacerbated when moving from sitting to standing and standing and ambulating just a few feet.  Patient does report associated weakness in the lower extremities associated with her pain.  Patient reports she is able to ambulate with assistive device, walker only a few feet before requiring rest.  Pain has been improved with prior  procedures, including medial branch block and transforaminal epidural steroid injection.  Patient is interested in pursuing repeat injection.  Of note patient has trialed medicinal marijuana with Dr. Mckeon and reports palpitations side effect.  Patient has discontinued tramadol, tizanidine and gabapentin.  Pain is improved with prednisone which she takes prescribed by her pulmonologist.    Interval history:  Dr. Fulton:  05/10/2021-05/06/2022  Shireen Felix is a 76 y.o. female  who is presenting with a chief complaint of Neck Pain. The patient began experiencing this problem insidiously, and the pain has been gradually worsening over the past 6 month(s). The pain is described as throbbing, cramping, aching and heavy and is located in the bilateral cervical spine. Pain is intermittent and lasts hours. The  pain is nonradiating. The patient rates her pain a 7 out of ten and interferes with activities of daily living a 7 out of ten. Pain is exacerbated by rotation of the cervical spine, and is improved by rest. Patient reports no prior trauma, no prior spinal surgery      This patient is a 74 y.o. female who presents today complaining of the above noted pain/s. The patient describes the pain as follows.  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  She reports approximately 2 years ago she had to sudden deaths in the family which caused very stressful time for her and resulted in shingles rash in the left V1 distribution however she reports having excruciating pain in the left V1 and V2 distributions.  She denies having difficulty with eating food and brushing her teeth on the left side of her face however the left lateral side of her nose gets her excruciating pain.  She has been tried on numerous medications in the past including gabapentin, Lyrica, Valtrex, Celebrex,  ibuprofen which have all provided minimal benefit however steroids have been most helpful.  Today she rates her pain as an 4/10 and describes a constant burning sensation the left side of her face in addition to radiation into her bilateral lower extremities, her right shoulder, her left upper extremity occasionally as a pins and needle sensation.  She does report having numbness and weakness in her left lower extremity.  She has been physical therapy which she completed in February of 2019 however this caused most of her low back and leg symptoms to worsen in addition to her post herpetic neuralgia to worsen.  She denies having bowel bladder difficulties.  Her symptoms are worse with activity such as walking in her somewhat improved with rest however she had finds it difficult to get into a comfortable position. She has been using topical lidocaine patches and applying to the left side of her face which does provide significant benefit.  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     Shireen Felix is a 76 y.o. female  who is presenting with a chief complaint of lumbar spine. The patient began experiencing this problem insidiously, and the pain has been gradually worsening over the past 2 year(s). The pain is described as throbbing, shooting, burning, aching and electrical and is located in the bilateral lumbar spine. Pain is intermittent and lasts hours. The pain radiates to bilateral lower extremities. The patient rates her pain a 7 out of ten and interferes with activities of daily living a 6 out of ten. Pain is exacerbated by flexion of the lumbar spine, and is improved by rest. Patient reports no prior trauma, no prior spinal surgery     Interval HPI 06/03/2020: Dr. Dias:  Ms. Felix returns to clinic for follow-up.  She underwent a T12-L1 transforaminal epidural  steroid injection on March 10, 2020 and she reports that this has provided significant pain relief for her which radiated into her right leg prior to the injection.  She does report that his symptoms have returned and the injections worn off her pain is currently rated 7/10.  She was scheduled to undergo bilateral lumbar radiofrequency ablation prior to corona virus however she would like to hold off on that at this time and pursue repeat injection in the low back.  Unfortunately she also reports that she had her wheelchair fall over a few weeks ago and this has resulted in bilateral shoulder pain.  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  She finds that rest does provide some pain relief on activity causes her symptoms to worsen.  She has been able to walk significantly more after her most recent injection in March reported she is able to walk several steps before having to sit rest however this has a vast improvement from prior to the injection.    Interval HPI:  02/12/2020; Dr. Dias  Ms. Felix returns to clinic for follow-up.  She underwent bilateral lumbar medial branch blocks on January 31, 2020 and reports 100% symptomatic pain relief for approximately 1 week and his symptoms slowly began to return.  She continues to have some right-sided lower thoracic posterior pain which was not completely dressed with the medial branch blocks.  Today she rates her pain as an 8/10 which is located primarily in the axial lumbar spine and the lower right posterior thoracic region.  She denies having numbness weakness in the legs but does continue to have a constant aching and throbbing sensation in the low back.    Interval HPI 01/23/2020: Dr. Dias: Ms. Felix returns to clinic for follow-up.  She reports that she underwent bilateral L4/5 transforaminal epidural steroid injections in November 2019 reports ongoing 80%  symptomatic pain relief.  She has also been participating physical therapy which she has found to be very helpful however 4 days ago she started to do some leg raise is in her bed and she felt severe pain in her low back which has not subsided.  She denies having any radiation except for around her flank into her anterior abdomen.  She feels like the pain sometimes comes straight through from her back to her abdomen.  Today she rates her pain as a 10/10 describes it as a constant aching and sharp pain. She has been using a heating pad which is minimally helpful and she has been holding off on physical therapy at this time. She denies having bowel bladder difficulty.     Interval History: Patient was seen on 8/4/19. At that time she underwent bilateral L3/4 TF XANDER.  The patient reports that she is/was better following the procedure.  she reports 75% pain relief.  She had a fall, then pain increased. She is currently living at [facility]. She is doing at home therapy there, which is helping. She is in wheelchair now but hopes to transition to walker soon.  After the fall, she was not able to get out of the bed.      Initial History of Present Illness:  Dr. Dias  This patient is a 74 y.o. female who presents today complaining of the above noted pain/s. The patient describes the pain as follows.  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  She reports approximately 2 years ago she had to sudden deaths in the family which caused very stressful time for her and resulted in shingles rash in the left V1 distribution however she reports having excruciating pain in the left V1 and V2 distributions.  She denies having difficulty with eating food and brushing her teeth on the left side of her face however the left lateral side of her nose gets her excruciating pain.  She has been tried on  numerous medications in the past including gabapentin, Lyrica, Valtrex, Celebrex, ibuprofen which have all provided minimal benefit however steroids have been most helpful.  Today she rates her pain as an 8/10 and describes a constant burning sensation the left side of her face in addition to radiation into her bilateral lower extremities, her right shoulder, her left upper extremity occasionally as a pins and needle sensation.  She does report having numbness and weakness in her left lower extremity.  She has been physical therapy which she completed in February of 2019 however this caused most of her low back and leg symptoms to worsen in addition to her post herpetic neuralgia to worsen.  She denies having bowel bladder difficulties.  Her symptoms are worse with activity such as walking in her somewhat improved with rest however she had finds it difficult to get into a comfortable position. She has been using topical lidocaine patches and applying to the left side of her face which does provide significant benefit.  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    - pertinent negatives: No fever, No chills, No weight loss, No bladder dysfunction, No bowel dysfunction, No saddle anesthesia  - pertinent positives: generalized nonspecific Lower Extremity weakness bilaterally    - medications, other therapies tried (physical therapy, injections):     >> NSAIDs, Tylenol, Tramadol, Norco, gabapentin, lyrica, flexeril and medrol dose pack    >> Has previously undergone Physical Therapy      Pain injections:  -11/16/2021: Right-sided SI joint and greater trochanteric bursa injection; Dr. Fulton  -07/09/2020: Bilateral glenohumeral joint injection; Dr. Dias  -06/19/2020: Right-sided T12/L1 transforaminal epidural steroid injection; Dr. Dias  -03/10/2020:  T12/L1 transforaminal epidural  steroid injection; Dr. Dias  -01/31/2020: Bilateral L3-5 medial branch blocks; Dr. Dias  -11/12/2019: Bilateral L4/5 transforaminal epidural steroid injection; Dr. Dias        Imaging / Labs / Studies (reviewed on 3/9/2023):  02/06/20    CT Lumbar Spine Without Contrast    Narrative  EXAMINATION:  CT LUMBAR SPINE WITHOUT CONTRAST    CLINICAL HISTORY:  Low back painLow back pain, >6wks conservative tx, persistent-progressive sx, surgical candidate;    FINDINGS:  The lumbar vertebral bodies demonstrate adequate alignment.Disc space narrowing from T11 through S1.No acute fractures are identified.    T11-T12: Disc space narrowing.  Broad-based disc bulge.  Old vertebral endplate fracture of T12.    T12-L1: Small disc protrusion paramedian to the right side.  Degenerative changes of the vertebral endplates and facet joints.    L1-L2: Old fracture of the inferior endplate of L1.  Mild posterior displacement of a fracture fragment minimally narrowing the AP diameter of the canal.  Gas in the disc space consistent with degenerating disc material.    L2-L3: Circumferential disc bulge.  Bilateral degenerative changes of the facets.  Hypertrophy of the ligamentum flavum.  Moderate to severe central spinal stenosis.  Bony neural foraminal narrowing without definite nerve root impingement.    L3-L4: Circumferential disc bulge.  Bilateral degenerative changes of the facets.  Degenerative changes of the vertebral endplates.  Severe central spinal stenosis.  Hypertrophy of the ligamentum flavum and degenerative changes of the facets.    L4-L5: Circumferential disc bulge.  Severe central spinal stenosis.  Bilateral degenerative changes of the facets.  Bilateral bony neural foraminal narrowing and possible bilateral L4 nerve root impingement.    L5-S1: Circumferential disc bulge.  Gas formation within the disc.  Moderate central spinal stenosis.  Bilateral degenerative changes of the facets.  Bilateral bony neural foraminal  narrowing with possible bilateral L5 nerve root impingement.    Atherosclerotic abdominal aorta without aneurysmal dilatation.     07/31/19    CT Cervical Spine Without Contrast    FINDINGS:  Osteopenia.  Grade 2 degenerative spondylolisthesis at C3-C4.  Grade 1 degenerative spondylolisthesis at C4-C5.  Vertebral body height is normal.  No acute fractures. No prevertebral soft tissue swelling. Atlanto-occipital articulation is normal.  Congenitally patulous spinal canal.  No significant spinal stenosis.  Moderate left foraminal stenosis at C3-C4.    01/23/20    X-Ray Lumbar Spine Ap And Lateral  FINDINGS:  Levoscoliosis present.  Vertebral body heights stable.  Alignment unchanged without spondylolisthesis.  Similar appearing multilevel degenerative disc height loss and osteophyte findings noted.  Multilevel facet degenerative findings remain.  Aorta iliac atherosclerotic calcification.  Colonic constipation findings present.    6/04/19    X-Ray Cervical Spine AP And Lateral    FINDINGS:  Reversal normal C-spine curvature noted on the lateral view with visualization to the C7-T1 level.  Minimal anterior subluxation C4 on C5 noted.  There is multilevel marginal spurring and varying degrees of loss of disc height throughout the C-spine.  No acute fracture identified in prevertebral soft tissues, C1-2 articulation and odontoid appear within normal limits allowing for positioning.  Minimal vascular calcification identified on the left in the area of the carotid vessels.    Review of Systems:  CONSTITUTIONAL: patient denies any fever, chills, or weight loss  SKIN: patient denies any rash or itching  RESPIRATORY: patient denies having any shortness of breath  GASTROINTESTINAL: patient denies having any diarrhea, constipation, or bowel incontinence  GENITOURINARY: patient denies having any abnormal bladder function    MUSCULOSKELETAL:  - patient complains of the above noted pain/s (see chief pain  "complaint)    NEUROLOGICAL:   - pain as above  - strength in Upper and Lower extremities is decreased, BILATERALLY  - sensation in Upper extremities is intact, BILATERALLY  - patient denies any loss of bowel or bladder control      PSYCHIATRIC: patient denies any change in mood    Other:  All other systems reviewed and are negative      Physical Exam:  BP (!) 156/72   Pulse 66   Resp 16   Ht 5' 4" (1.626 m)   Wt 58.9 kg (129 lb 13.6 oz)   LMP  (LMP Unknown)   BMI 22.29 kg/m²  (reviewed on 3/9/2023)  Physical Exam    GENERAL: Well appearing, in no acute distress, alert and oriented x3.  PSYCH:  Mood and affect appropriate.  SKIN: Skin color, texture, turgor normal, no rashes or lesions.  HEAD/FACE:  Normocephalic, bruising along right lower jaw, Cranial nerves grossly intact.    CV: RRR with palpation of the radial artery.  PULM: No evidence of respiratory difficulty, symmetric chest rise.  GI:  Soft and non-tender.  Neck: TTP along cervical facet joints, positive facet loading, spurlings positive bilaterally, pain with flexion, extension, and rotation - right greater than left  BACK: Straight leg raising in the sitting and supine positions is negative to radicular pain. pain to palpation over the facet joints of the lumbar spine or spinous processes.  Reduced range of motion with pain reproduction   EXTREMITIES:  Peripheral joint range of motion is reduced with pain with obvious instability in bilateral lower extremities.  No deformities, edema, or skin discoloration. Good capillary refill.  MUSCULOSKELETAL:  Unable to stand on heels and toes  hip, and knee provocative maneuvers are negative.  There is no pain with palpation over the sacroiliac joints bilaterally.  Gaenslen's, Distraction/Compression and  FABERs test is negative.  Facet loading test is positive bilaterally.   Bilateral upper and lower extremity strength is normal and symmetric.  No atrophy or tone abnormalities are noted.    RIGHT Lower " extremity: Hip flexion 5/5, Hip Abduction 5/5, Hip Adduction 5/5, Knee extension 5/5, Knee flexion 5/5, Ankle dorsiflexion5/5, Extensor hallucis longus 5/5, Ankle plantarflexion 5/5  LEFT Lower extremity:  Hip flexion 5/5, Hip Abduction 5/5,Hip Adduction 5/5, Knee extension 5/5, Knee flexion 5/5, Ankle dorsiflexion 5/5, Extensor hallucis longus 5/5, Ankle plantarflexion 5/5  -Normal testing knee (patellar) jerk and ankle (achilles) jerk    NEURO: Bilateral upper and lower extremity coordination and muscle stretch reflexes are physiologic and symmetric. No loss of sensation is noted.  GAIT:  Patient presents in wheelchair today.  Antalgic gait.      Assessment:    Shireen Felix is a 77 y.o. year old female who is presenting with   Encounter Diagnoses   Name Primary?    Cervical radiculopathy Yes    Lumbar radiculopathy, chronic              Plan:    1. Interventional:  Schedule for C6/7 [place] XANDER to address neck pain and radiculopathy followed 2 weeks later by bilateral L4/5 TFESI to address lumbar radiculopathy  Refer to Neurosurgery if no improvement with this injection  Anticoagulation:  Yes, Plavix  Per ASA guidelines for secondary prophylaxis, Dr. Felix -need clearance to hold Plavix 5 days prior to each epidural procedure  Patient must also have completed antibiotics at least 2 weeks prior to injections    2. Pharmacologic:   Continue Gabapentin 100 mg BID per outside provider        3. Rehabilitative:   -We discussed initiating physical therapy to help manage the patient/s painful condition. The patient was counseled that muscle strengthening will improve the long term prognosis in regards to pain and may also help increase range of motion and mobility. They were told that one of the goals of physical therapy is that they learn how to do the exercises so that they can do them independently at home daily upon completion. The patient's questions were answered and they were agreeable to this course. A  referral for physical therapy was provided to the patient at previous visit she is scheduled to start this week.      4. Diagnostic:  CT of cervical spine and lumbar spine reviewed.  Lumbar CT demonstrates severe spinal stenosis at L4-5 and L5-S1 levels.  EMG of bilateral upper extremities demonstrates severe carpal tunnel syndrome and cervical radiculopathy    5. Follow up:  4 weeks after injections with Dr. Perrin to discuss alternative treatment options    Alanis Peres PA-C          The above plan and management options were discussed at length with patient. Patient is in agreement with the above and verbalized understanding.    - I discussed the goals of interventional chronic pain management with the patient on today's visit. We discussed a multimodal and systematic approach to pain.  This includes diagnostic and therapeutic injections, adjuvant pharmacologic treatment, physical therapy, and at times psychiatry.  I emphasized the importance of regular exercise, core strengthening and stretching, diet and weight loss as a cornerstone of long-term pain management.    - This condition does not require this patient to take time off of work, and the primary goal of our Pain Management services is to improve the patient's functional capacity.  - Patient Questions: Answered all of the patient's questions regarding diagnoses, therapy, treatment and next steps    Disclaimer:  This note may have been prepared using voice recognition software, it may have not been extensively proofed, as such there could be errors within the text such as sound alike errors.

## 2023-03-10 ENCOUNTER — TELEPHONE (OUTPATIENT)
Dept: INTERNAL MEDICINE | Facility: CLINIC | Age: 78
End: 2023-03-10
Payer: MEDICARE

## 2023-03-10 ENCOUNTER — TELEPHONE (OUTPATIENT)
Dept: PAIN MEDICINE | Facility: CLINIC | Age: 78
End: 2023-03-10
Payer: MEDICARE

## 2023-03-10 NOTE — TELEPHONE ENCOUNTER
----- Message from Evelyn Tai sent at 3/10/2023  3:17 PM CST -----  Contact: [facility]  Michael is calling in regards to urgent request refill for diabetic supplies (lancets, meter, swab etc). Please fax over too [phone]. call back # [phone]          Thanks  DD

## 2023-03-10 NOTE — TELEPHONE ENCOUNTER
Spoke with [facility] and informed them that I don't have the order for Dr. Chaudhari to sign for patient's diabetic testing supplies. They stated that they will fax it over.

## 2023-03-13 ENCOUNTER — TELEPHONE (OUTPATIENT)
Dept: INTERNAL MEDICINE | Facility: CLINIC | Age: 78
End: 2023-03-13
Payer: MEDICARE

## 2023-03-13 NOTE — TELEPHONE ENCOUNTER
Diabetic supply order faxed back to [facility] ([facility] as requested, signed by Dr. Chaudhari.

## 2023-03-13 NOTE — TELEPHONE ENCOUNTER
----- Message from Tressa Rodriguez sent at 3/13/2023 10:43 AM CDT -----  Contact: Estu[facility]/ [facility]  Estuardo is calling to speak to the nurse to request a refill on diabetic supplies, please give him a call back at     Thanks  LJ

## 2023-03-16 ENCOUNTER — OFFICE VISIT (OUTPATIENT)
Dept: OPHTHALMOLOGY | Facility: CLINIC | Age: 78
End: 2023-03-16
Payer: MEDICARE

## 2023-03-16 DIAGNOSIS — Z96.1 PSEUDOPHAKIA OF BOTH EYES: ICD-10-CM

## 2023-03-16 DIAGNOSIS — H04.129 DRY EYE: ICD-10-CM

## 2023-03-16 DIAGNOSIS — H17.9 CORNEAL SCAR, LEFT EYE: ICD-10-CM

## 2023-03-16 DIAGNOSIS — H40.1132 PRIMARY OPEN ANGLE GLAUCOMA (POAG) OF BOTH EYES, MODERATE STAGE: Primary | ICD-10-CM

## 2023-03-16 PROCEDURE — 99214 PR OFFICE/OUTPT VISIT, EST, LEVL IV, 30-39 MIN: ICD-10-PCS | Mod: S$GLB,,, | Performed by: OPHTHALMOLOGY

## 2023-03-16 PROCEDURE — 99214 OFFICE O/P EST MOD 30 MIN: CPT | Mod: S$GLB,,, | Performed by: OPHTHALMOLOGY

## 2023-03-16 PROCEDURE — 1160F PR REVIEW ALL MEDS BY PRESCRIBER/CLIN PHARMACIST DOCUMENTED: ICD-10-PCS | Mod: CPTII,S$GLB,, | Performed by: OPHTHALMOLOGY

## 2023-03-16 PROCEDURE — 99999 PR PBB SHADOW E&M-EST. PATIENT-LVL IV: CPT | Mod: PBBFAC,,, | Performed by: OPHTHALMOLOGY

## 2023-03-16 PROCEDURE — 99999 PR PBB SHADOW E&M-EST. PATIENT-LVL IV: ICD-10-PCS | Mod: PBBFAC,,, | Performed by: OPHTHALMOLOGY

## 2023-03-16 PROCEDURE — 1159F MED LIST DOCD IN RCRD: CPT | Mod: CPTII,S$GLB,, | Performed by: OPHTHALMOLOGY

## 2023-03-16 PROCEDURE — 1159F PR MEDICATION LIST DOCUMENTED IN MEDICAL RECORD: ICD-10-PCS | Mod: CPTII,S$GLB,, | Performed by: OPHTHALMOLOGY

## 2023-03-16 PROCEDURE — 1160F RVW MEDS BY RX/DR IN RCRD: CPT | Mod: CPTII,S$GLB,, | Performed by: OPHTHALMOLOGY

## 2023-03-16 RX ORDER — DORZOLAMIDE HYDROCHLORIDE AND TIMOLOL MALEATE 20; 5 MG/ML; MG/ML
1 SOLUTION/ DROPS OPHTHALMIC EVERY 12 HOURS
Qty: 10 ML | Refills: 12 | Status: SHIPPED | OUTPATIENT
Start: 2023-03-16

## 2023-03-16 NOTE — PROGRESS NOTES
SUBJECTIVE  Jetty Veronica Felix is 77 y.o. female  Corrected distance visual acuity was 20/30 - in the right eye and 20/40 - in the left eye.   Chief Complaint   Patient presents with    Glaucoma     The patient states her eyes are ok but her left eye is hurting and very itchy. The patient is using her eye drops.          HPI     Glaucoma     Additional comments: The patient states her eyes are ok but her left eye   is hurting and very itchy. The patient is using her eye drops.           Comments    Likes ONL early    Previous DNL PT  PCP Dr. Chaudhari    1. Mod COAG goal = 18-19 +Fhx coag-mother   Pt states she has had elevated pressures in the past.   2. 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 ERM OD  CR scar OD  4. DM-2005  5. PCIOL OU? -done in [place]  6. Dry Eyes  7. Refractive error PAL Rx    Latanoprost ou qhs  Timolol qam OU    AT's prn OU            Last edited by Sis Baeza on 3/16/2023 11:18 AM.         Assessment /Plan :  1. Primary open angle glaucoma (POAG) of both eyes, moderate stage Intraocular pressure (IOP) not within acceptable range relative to target IOP with risk of irreversible visual loss. Better IOP control is recommended. Treatment options may include change or additional medications, Selective Laser Trabeculoplasty (SLT laser), and/or incisional glaucoma surgery. Reviewed importance of continued compliance with treatment and follow up.     Patient chooses  to change Timolol to Dorzolamide/Timolol one drop in each eye every 12 hours. Continue Latanoprost one drop in each eye every night    Return to clinic in 6 weeks  or as needed.  With  Mrx, GOCT, Dilation, and HVF 24-2     2. Pseudophakia of both eyes  -- Condition stable, no therapeutic change required. Monitoring routinely.     3. Corneal scar, left eye - monitor for now   4. Dry Eyes of both eyes - recommend Ivizia one drop in each eye as needed

## 2023-03-21 ENCOUNTER — OFFICE VISIT (OUTPATIENT)
Dept: ORTHOPEDICS | Facility: CLINIC | Age: 78
End: 2023-03-21
Payer: MEDICARE

## 2023-03-21 VITALS — WEIGHT: 129 LBS | HEIGHT: 64 IN | BODY MASS INDEX: 22.02 KG/M2

## 2023-03-21 DIAGNOSIS — M18.0 ARTHRITIS OF CARPOMETACARPAL (CMC) JOINT OF BOTH THUMBS: ICD-10-CM

## 2023-03-21 DIAGNOSIS — G56.03 CARPAL TUNNEL SYNDROME ON BOTH SIDES: Primary | ICD-10-CM

## 2023-03-21 PROCEDURE — 1101F PR PT FALLS ASSESS DOC 0-1 FALLS W/OUT INJ PAST YR: ICD-10-PCS | Mod: CPTII,S$GLB,, | Performed by: ORTHOPAEDIC SURGERY

## 2023-03-21 PROCEDURE — 20526 THER INJECTION CARP TUNNEL: CPT | Mod: 50,S$GLB,, | Performed by: ORTHOPAEDIC SURGERY

## 2023-03-21 PROCEDURE — 1159F PR MEDICATION LIST DOCUMENTED IN MEDICAL RECORD: ICD-10-PCS | Mod: CPTII,S$GLB,, | Performed by: ORTHOPAEDIC SURGERY

## 2023-03-21 PROCEDURE — 99213 PR OFFICE/OUTPT VISIT, EST, LEVL III, 20-29 MIN: ICD-10-PCS | Mod: 25,S$GLB,, | Performed by: ORTHOPAEDIC SURGERY

## 2023-03-21 PROCEDURE — 1160F RVW MEDS BY RX/DR IN RCRD: CPT | Mod: CPTII,S$GLB,, | Performed by: ORTHOPAEDIC SURGERY

## 2023-03-21 PROCEDURE — 20600 SMALL JOINT ASPIRATION/INJECTION: R THUMB CMC, L THUMB CMC: ICD-10-PCS | Mod: 50,59,S$GLB, | Performed by: ORTHOPAEDIC SURGERY

## 2023-03-21 PROCEDURE — 1159F MED LIST DOCD IN RCRD: CPT | Mod: CPTII,S$GLB,, | Performed by: ORTHOPAEDIC SURGERY

## 2023-03-21 PROCEDURE — 1101F PT FALLS ASSESS-DOCD LE1/YR: CPT | Mod: CPTII,S$GLB,, | Performed by: ORTHOPAEDIC SURGERY

## 2023-03-21 PROCEDURE — 3288F FALL RISK ASSESSMENT DOCD: CPT | Mod: CPTII,S$GLB,, | Performed by: ORTHOPAEDIC SURGERY

## 2023-03-21 PROCEDURE — 1160F PR REVIEW ALL MEDS BY PRESCRIBER/CLIN PHARMACIST DOCUMENTED: ICD-10-PCS | Mod: CPTII,S$GLB,, | Performed by: ORTHOPAEDIC SURGERY

## 2023-03-21 PROCEDURE — 1125F AMNT PAIN NOTED PAIN PRSNT: CPT | Mod: CPTII,S$GLB,, | Performed by: ORTHOPAEDIC SURGERY

## 2023-03-21 PROCEDURE — 99213 OFFICE O/P EST LOW 20 MIN: CPT | Mod: 25,S$GLB,, | Performed by: ORTHOPAEDIC SURGERY

## 2023-03-21 PROCEDURE — 99999 PR PBB SHADOW E&M-EST. PATIENT-LVL IV: CPT | Mod: PBBFAC,,, | Performed by: ORTHOPAEDIC SURGERY

## 2023-03-21 PROCEDURE — 20526 CARPAL TUNNEL: ICD-10-PCS | Mod: 50,S$GLB,, | Performed by: ORTHOPAEDIC SURGERY

## 2023-03-21 PROCEDURE — 1125F PR PAIN SEVERITY QUANTIFIED, PAIN PRESENT: ICD-10-PCS | Mod: CPTII,S$GLB,, | Performed by: ORTHOPAEDIC SURGERY

## 2023-03-21 PROCEDURE — 99999 PR PBB SHADOW E&M-EST. PATIENT-LVL IV: ICD-10-PCS | Mod: PBBFAC,,, | Performed by: ORTHOPAEDIC SURGERY

## 2023-03-21 PROCEDURE — 20600 DRAIN/INJ JOINT/BURSA W/O US: CPT | Mod: 50,59,S$GLB, | Performed by: ORTHOPAEDIC SURGERY

## 2023-03-21 PROCEDURE — 3288F PR FALLS RISK ASSESSMENT DOCUMENTED: ICD-10-PCS | Mod: CPTII,S$GLB,, | Performed by: ORTHOPAEDIC SURGERY

## 2023-03-21 RX ORDER — TRIAMCINOLONE ACETONIDE 40 MG/ML
40 INJECTION, SUSPENSION INTRA-ARTICULAR; INTRAMUSCULAR
Status: DISCONTINUED | OUTPATIENT
Start: 2023-03-21 | End: 2023-03-21 | Stop reason: HOSPADM

## 2023-03-21 RX ADMIN — TRIAMCINOLONE ACETONIDE 40 MG: 40 INJECTION, SUSPENSION INTRA-ARTICULAR; INTRAMUSCULAR at 11:03

## 2023-03-21 NOTE — PROCEDURES
Carpal Tunnel    Date/Time: 3/21/2023 11:45 AM  Performed by: Rizwan Navarro MD  Authorized by: Rizwan Navarro MD     Consent Done?:  Yes (Verbal)  Indications:  Pain  Timeout: prior to procedure the correct patient, procedure, and site was verified    Prep: patient was prepped and draped in usual sterile fashion      Local anesthesia used?: Yes    Local anesthetic:  Lidocaine 2% without epinephrine  Anesthetic total (ml):  2    Location:  Wrist  Site:  R carpal tunnel and L carpal tunnel  Ultrasonic Guidance for Needle Placement?: No    Needle size:  25 G  Approach:  Volar  Medications:  40 mg triamcinolone acetonide 40 mg/mL; 40 mg triamcinolone acetonide 40 mg/mL

## 2023-03-21 NOTE — PROCEDURES
Small Joint Aspiration/Injection: R thumb CMC, L thumb CMC    Date/Time: 3/21/2023 11:45 AM  Performed by: Rizwan Navarro MD  Authorized by: Rizwan Navarro MD     Consent Done?:  Yes (Verbal)  Indications:  Arthritis  Local anesthesia used?: Yes    Local anesthetic:  Lidocaine 2% without epinephrine  Anesthetic total (ml):  1    Location:  Thumb  Site:  R thumb CMC and L thumb CMC  Ultrasonic guidance for needle placement?: No    Needle size:  25 G  Approach:  Dorsal  Medications:  40 mg triamcinolone acetonide 40 mg/mL; 40 mg triamcinolone acetonide 40 mg/mL

## 2023-04-05 ENCOUNTER — LAB VISIT (OUTPATIENT)
Dept: LAB | Facility: HOSPITAL | Age: 78
End: 2023-04-05
Attending: NURSE PRACTITIONER
Payer: MEDICARE

## 2023-04-05 ENCOUNTER — PATIENT OUTREACH (OUTPATIENT)
Dept: ADMINISTRATIVE | Facility: HOSPITAL | Age: 78
End: 2023-04-05
Payer: MEDICARE

## 2023-04-05 ENCOUNTER — OFFICE VISIT (OUTPATIENT)
Dept: NEUROLOGY | Facility: CLINIC | Age: 78
End: 2023-04-05
Payer: MEDICARE

## 2023-04-05 ENCOUNTER — TELEPHONE (OUTPATIENT)
Dept: PAIN MEDICINE | Facility: CLINIC | Age: 78
End: 2023-04-05
Payer: MEDICARE

## 2023-04-05 VITALS
DIASTOLIC BLOOD PRESSURE: 67 MMHG | HEIGHT: 64 IN | SYSTOLIC BLOOD PRESSURE: 158 MMHG | BODY MASS INDEX: 22.14 KG/M2 | HEART RATE: 76 BPM

## 2023-04-05 DIAGNOSIS — G47.30 SLEEP APNEA, UNSPECIFIED TYPE: Primary | ICD-10-CM

## 2023-04-05 DIAGNOSIS — Z86.73 HX OF COMPLETED STROKE: ICD-10-CM

## 2023-04-05 DIAGNOSIS — M79.18 MYOFASCIAL MUSCLE PAIN: ICD-10-CM

## 2023-04-05 DIAGNOSIS — G40.109 TEMPORAL LOBE EPILEPSY: ICD-10-CM

## 2023-04-05 DIAGNOSIS — Z86.79 HISTORY OF CEREBRAL ANEURYSM REPAIR: ICD-10-CM

## 2023-04-05 DIAGNOSIS — G31.84 MILD COGNITIVE IMPAIRMENT: ICD-10-CM

## 2023-04-05 DIAGNOSIS — G40.109 TEMPORAL LOBE SEIZURE: ICD-10-CM

## 2023-04-05 DIAGNOSIS — Z98.890 HISTORY OF CEREBRAL ANEURYSM REPAIR: ICD-10-CM

## 2023-04-05 DIAGNOSIS — Z98.890 HX OF CRANIOTOMY: ICD-10-CM

## 2023-04-05 DIAGNOSIS — R09.82 POSTNASAL DRIP: ICD-10-CM

## 2023-04-05 DIAGNOSIS — R29.90 MULTIPLE NEUROLOGICAL SYMPTOMS: ICD-10-CM

## 2023-04-05 DIAGNOSIS — B02.29 POSTHERPETIC NEURALGIA: ICD-10-CM

## 2023-04-05 DIAGNOSIS — R11.0 NAUSEA: ICD-10-CM

## 2023-04-05 DIAGNOSIS — F32.A DEPRESSION, UNSPECIFIED DEPRESSION TYPE: ICD-10-CM

## 2023-04-05 DIAGNOSIS — Z86.73 HISTORY OF STROKE: ICD-10-CM

## 2023-04-05 DIAGNOSIS — R42 DIZZINESS AND GIDDINESS: ICD-10-CM

## 2023-04-05 DIAGNOSIS — R05.9 COUGH, UNSPECIFIED TYPE: ICD-10-CM

## 2023-04-05 PROCEDURE — 99999 PR PBB SHADOW E&M-EST. PATIENT-LVL V: ICD-10-PCS | Mod: PBBFAC,,, | Performed by: NURSE PRACTITIONER

## 2023-04-05 PROCEDURE — 1101F PT FALLS ASSESS-DOCD LE1/YR: CPT | Mod: CPTII,S$GLB,, | Performed by: NURSE PRACTITIONER

## 2023-04-05 PROCEDURE — 3077F SYST BP >= 140 MM HG: CPT | Mod: CPTII,S$GLB,, | Performed by: NURSE PRACTITIONER

## 2023-04-05 PROCEDURE — 99215 OFFICE O/P EST HI 40 MIN: CPT | Mod: S$GLB,,, | Performed by: NURSE PRACTITIONER

## 2023-04-05 PROCEDURE — 1160F PR REVIEW ALL MEDS BY PRESCRIBER/CLIN PHARMACIST DOCUMENTED: ICD-10-PCS | Mod: CPTII,S$GLB,, | Performed by: NURSE PRACTITIONER

## 2023-04-05 PROCEDURE — 1159F MED LIST DOCD IN RCRD: CPT | Mod: CPTII,S$GLB,, | Performed by: NURSE PRACTITIONER

## 2023-04-05 PROCEDURE — 36415 COLL VENOUS BLD VENIPUNCTURE: CPT | Performed by: NURSE PRACTITIONER

## 2023-04-05 PROCEDURE — 3288F PR FALLS RISK ASSESSMENT DOCUMENTED: ICD-10-PCS | Mod: CPTII,S$GLB,, | Performed by: NURSE PRACTITIONER

## 2023-04-05 PROCEDURE — 3078F DIAST BP <80 MM HG: CPT | Mod: CPTII,S$GLB,, | Performed by: NURSE PRACTITIONER

## 2023-04-05 PROCEDURE — 3077F PR MOST RECENT SYSTOLIC BLOOD PRESSURE >= 140 MM HG: ICD-10-PCS | Mod: CPTII,S$GLB,, | Performed by: NURSE PRACTITIONER

## 2023-04-05 PROCEDURE — 3288F FALL RISK ASSESSMENT DOCD: CPT | Mod: CPTII,S$GLB,, | Performed by: NURSE PRACTITIONER

## 2023-04-05 PROCEDURE — 1126F AMNT PAIN NOTED NONE PRSNT: CPT | Mod: CPTII,S$GLB,, | Performed by: NURSE PRACTITIONER

## 2023-04-05 PROCEDURE — 80299 QUANTITATIVE ASSAY DRUG: CPT | Performed by: NURSE PRACTITIONER

## 2023-04-05 PROCEDURE — 1126F PR PAIN SEVERITY QUANTIFIED, NO PAIN PRESENT: ICD-10-PCS | Mod: CPTII,S$GLB,, | Performed by: NURSE PRACTITIONER

## 2023-04-05 PROCEDURE — 1159F PR MEDICATION LIST DOCUMENTED IN MEDICAL RECORD: ICD-10-PCS | Mod: CPTII,S$GLB,, | Performed by: NURSE PRACTITIONER

## 2023-04-05 PROCEDURE — 1101F PR PT FALLS ASSESS DOC 0-1 FALLS W/OUT INJ PAST YR: ICD-10-PCS | Mod: CPTII,S$GLB,, | Performed by: NURSE PRACTITIONER

## 2023-04-05 PROCEDURE — 99999 PR PBB SHADOW E&M-EST. PATIENT-LVL V: CPT | Mod: PBBFAC,,, | Performed by: NURSE PRACTITIONER

## 2023-04-05 PROCEDURE — 99215 PR OFFICE/OUTPT VISIT, EST, LEVL V, 40-54 MIN: ICD-10-PCS | Mod: S$GLB,,, | Performed by: NURSE PRACTITIONER

## 2023-04-05 PROCEDURE — 3078F PR MOST RECENT DIASTOLIC BLOOD PRESSURE < 80 MM HG: ICD-10-PCS | Mod: CPTII,S$GLB,, | Performed by: NURSE PRACTITIONER

## 2023-04-05 PROCEDURE — 1160F RVW MEDS BY RX/DR IN RCRD: CPT | Mod: CPTII,S$GLB,, | Performed by: NURSE PRACTITIONER

## 2023-04-05 RX ORDER — TIZANIDINE 4 MG/1
TABLET ORAL
Qty: 60 TABLET | Refills: 5 | Status: SHIPPED | OUTPATIENT
Start: 2023-04-05 | End: 2023-05-16

## 2023-04-05 NOTE — TELEPHONE ENCOUNTER
----- Message from Alanis Peres PA-C sent at 4/5/2023 12:52 PM CDT -----  If this is regarding the Tizanidine, I sent in a 6 month supply today    Alanis  ----- Message -----  From: Dorian Covington  Sent: 4/5/2023  11:37 AM CDT  To: [facility] Staff    Pt brother is calling in regards to his sisters medication    He needs to consult with nurse pls call back at [phone]    Thank you    Lyn

## 2023-04-05 NOTE — PROGRESS NOTES
"  Subjective:       Patient ID: Shireen Felix is a 77 y.o. female.    Chief Complaint: Multiple neurolgical symptoms           Seizures   Associated symptoms include visual disturbances. Pertinent negatives include no confusion, no headaches, no speech difficulty, no chest pain, no nausea and no vomiting. Characteristics include apnea.      The patient is here to establish care for numerous, complex chronic neurological disorders.    Had a stroke in 2000/2001 that caused LT HP. On Plavix and Lipitor. VRFs are stratified (HTN, HLD, T2DM, Quit Smoking).     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    Was started on PHT for "seizure prophylaxis" after the aneurysm and was stopped after 2 years. Around 1612-3825 she started having drop attacks with LOC for few minutes with no seizure activity. Patient describes her "seizures" as episodes of falling down and eyes go back lasting for a few seconds. Denies shaking, incontinence, or tongue biting. Was started on OXC and then switched to  mg QD by neurology at [facility].  She is undergoing cardiac evaluation. Stopped driving in 2017. 09- through 10- 92 hours AEEG showed B/L TLE, F8-T8, F7-T7. States last episode occurred at the end of September 2021 while she was sitting in a chair. Prior episode occurred around July 2021. She takes  mg daily. Denies adverse effects. She is no longer taking GBP. She believes lack of sleep is a trigger.      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 Failed PGB. She is on  mg BID and Lidocaine patches. GCA was suspected with no TORO. States ESL and lidocaine patches help decrease the pain mildly. No longer taking GBP.    Complains of problems with memory that started in 2019, but has worsened over the last year.  Both short term and long term memory are affected. She forgets why she is going into rooms as well as " "birthdays. Denies forgetting holidays or names. Does not drive. Denies getting lost. Able to complete ADLs such as cooking, bathing, dressing, and feeding self. Has forgotten to turn stove off before. Her family helps with her finances, makes doctors appointments, and keeps up with her medications. She does misplacing items within her home but does not place items in odd places. She denies falls. Denies drinking alcohol. Has trouble falling and staying asleep at night. No changes in appetite. Denies depression but states she has anxiety. Denies hallucinations. Denies history of hypothyroidism. Positive for history of stroke and aneurysms. She lives alone. Mother was diagnosed with demenita in her 70s and her maternal aunts had dementia.     12-: Patient is here for follow-up for multiple neurological complaints. Continues to take Plavix and Lipitor for stroke prevention without adverse effects. 10- CTA H/N Stable. No evidence of acute intracranial hemorrhage. Bilateral craniotomies with LT MCA aneurysm clip.No evidence of recurrent or residual aneurysm sac.  No other aneurysms    Currently taking ESL 1200 mg QD without adverse effects for seizures. Has not had seizure since dose increase. Last seizure September 2021.    State PHN pain has improved significantly with ESL 1200 mg QD and lidocaine patches.     No changes in memory since last visit. Vit B12, MMA, folate, HC, T4, and RPR unremarkable. Needs appointment with neuropsych.    Patient complains of dizziness with nausea that started 3 weeks ago (November 2021). She describes dizziness as "thrown off balance" and "head swimming". Denies spinning sensation. Also experiences light headedness. She states the dizziness occurs when standing and when turning her body around, although it does occur with laying down at times. It occurs with bending over.  It happens a few times a day lasting seconds. Denies ringing in ear but states she experiences " "decreased hearing in right ear. Denies double vision, ataxia, slurred speech, dysphagia, focal weakness, numbness, vomiting or headaches. Denies chest pain, palpitations, and sweating. States she feels like she's having a "hot flash" sometimes with dizziness.      6-: Patient presents for follow up appointment unaccompanied. Continues to take Plavix and Lipitor for stroke prevention without adverse effects.     Continues to take ESL 1200 mg QD without adverse effects for seizures. Last seizure September 2021. States she is having a new spell described as feeling tired then mind going blank for a few seconds. Unsure if her eyes are open or closed during events. Denies LOC with events. States she feels as if she is going to fall. She states she has had 2 of these spells in the last 3 months with last spell 6-.    Continues to have PHN pain. ESL 1200 mg QD has helped the pain.    Continues to have trouble with her memory. States her attention span is worsening, espiecally when she is tired. She has had COVID 19 twice in the last 6 months. States she had a fall and fractured her left foot. Has not completed NCT. 6-2-2022 CT sinuses shows postoperative changes from prior bilateral internal craniotomies.  Aneurysm clip within the left MCA distribution.     States her dizziness has improved.       INTERVAL HISTORY  04-: Patient is new to me but known to Dr. Simpson and Priya Benavidez, NP. Patient is present for follow up for multiple neurological complaints and sleep apnea concerns. Patient is unaccompanied. Patient has history of stroke, she continues to take Plavix and Lipitor for stroke prevention without adverse effects.     Continues to take ESL 1200 mg QD without adverse effects for seizures. Last seizure March 31, 2023  reports sitting in chair having a blank stare, eyes open, feeling faint, lasting briefly, states felt tired afterwards, denies tongue biting, no shaking, no jerking, no urinary " incontinence. Patient reports having sinusitis and multiple bouts of COVID.     Continues to have PHN pain. ESL 1200 mg QD has helped the pain. Compound cream is helpful, patient requested refills.     Patient denies any further trouble with her memory, no cognitive decline noted and no behavioral changes.     Patient request sleep apnea management. She was diagnosis with Sleep apena years ago in [facility] but never underwent treatment. She would like seek medicatl care.       Review of Systems   Constitutional:  Positive for fatigue. Negative for appetite change.   HENT:  Positive for hearing loss (left ear). Negative for tinnitus.    Eyes:  Positive for visual disturbance. Negative for photophobia.   Respiratory:  Positive for apnea and shortness of breath.    Cardiovascular:  Negative for chest pain and palpitations.   Gastrointestinal:  Negative for nausea and vomiting.   Endocrine: Negative for cold intolerance and heat intolerance.   Genitourinary:  Positive for urgency. Negative for difficulty urinating.   Musculoskeletal:  Positive for arthralgias, back pain, gait problem and neck pain. Negative for joint swelling, myalgias and neck stiffness.   Skin:  Negative for color change and rash.   Allergic/Immunologic: Negative for environmental allergies and immunocompromised state.   Neurological:  Positive for dizziness, seizures, syncope, weakness, light-headedness and numbness. Negative for tremors, facial asymmetry, speech difficulty and headaches.   Hematological:  Negative for adenopathy. Does not bruise/bleed easily.   Psychiatric/Behavioral:  Positive for dysphoric mood. Negative for agitation, behavioral problems, confusion, decreased concentration, hallucinations, self-injury, sleep disturbance and suicidal ideas. The patient is nervous/anxious. The patient is not hyperactive.                Current Outpatient Medications:     albuterol (PROVENTIL/VENTOLIN HFA) 90 mcg/actuation inhaler, INHALE TWO PUFFS  INTO THE LUNGS EVERY 4 HOURS AS NEEDED, Disp: 18 g, Rfl: 11    amLODIPine (NORVASC) 10 MG tablet, TAKE 1 TABLET BY MOUTH ONCE A DAY, Disp: 90 tablet, Rfl: 3    APTIOM 800 mg Tab, TAKE 1 TABLET BY MOUTH EVERY EVENING WITH 400MG TABLET, Disp: 30 tablet, Rfl: 5    atorvastatin (LIPITOR) 40 MG tablet, TAKE 1 TABLET BY MOUTH ONCE DAILY, Disp: 90 tablet, Rfl: 1    cholecalciferol, vitamin D3, 1,250 mcg (50,000 unit) capsule, Take 1 capsule (50,000 Units total) by mouth every 7 days., Disp: 12 capsule, Rfl: 0    clopidogreL (PLAVIX) 75 mg tablet, Take 1 tablet (75 mg total) by mouth once daily., Disp: 90 tablet, Rfl: 3    DALIRESP 500 mcg Tab, TAKE 1 TABLET BY MOUTH ONCE DAILY, Disp: 90 tablet, Rfl: 3    denosumab (PROLIA) 60 mg/mL Syrg, every 6 (six) months., Disp: , Rfl:     diclofenac (VOLTAREN) 25 MG TbEC, TAKE 1 TABLET BY MOUTH TWICE DAILY, Disp: 60 tablet, Rfl: 11    diltiaZEM (DILACOR XR) 120 MG CDCR, Take 1 capsule (120 mg total) by mouth once daily., Disp: 30 capsule, Rfl: 3    dorzolamide-timolol 2-0.5% (COSOPT) 22.3-6.8 mg/mL ophthalmic solution, Place 1 drop into both eyes every 12 (twelve) hours., Disp: 10 mL, Rfl: 12    eslicarbazepine (APTIOM) 400 mg Tab tablet, TAKE 1 TABLET BY MOUTH EVERY EVENING WITH 800 MG TABLET Strength: 400 mg, Disp: 30 tablet, Rfl: 5    fluticasone propionate (FLONASE) 50 mcg/actuation nasal spray, USE 2 SPRAYS INTO EACH NOSTRIL ONCE A DAY AT 6AM AS DIRECTED, Disp: 16 g, Rfl: 11    fluticasone-umeclidin-vilanter (TRELEGY ELLIPTA) 200-62.5-25 mcg inhaler, INHALE 1 PUFF BY MOUTH ONCE A DAY, Disp: 60 each, Rfl: 11    gabapentin (NEURONTIN) 100 MG capsule, Take 100 mg by mouth 2 (two) times daily as needed., Disp: , Rfl:     hydroCHLOROthiazide (HYDRODIURIL) 25 MG tablet, TAKE 1 TABLET BY MOUTH ONCE DAILY AS NEEDED, Disp: 90 tablet, Rfl: 3    ipratropium (ATROVENT) 42 mcg (0.06 %) nasal spray, USE 2 SPRAYS INTO EACH NOSTRIL FOUR TIMES DAILY, Disp: 15 mL, Rfl: 11    ketoconazole (NIZORAL)  2 % cream, Apply topically 2 (two) times daily. For dry flaky patches of ear., Disp: 30 g, Rfl: 1    latanoprost 0.005 % ophthalmic solution, Place 1 drop into both eyes every evening., Disp: 2.5 mL, Rfl: 12    levocetirizine (XYZAL) 5 MG tablet, Take 1 tablet (5 mg total) by mouth every evening., Disp: 30 tablet, Rfl: 11    magnesium oxide (MAG-OX) 400 mg (241.3 mg magnesium) tablet, Take 1 tablet (400 mg total) by mouth once daily., Disp: 90 tablet, Rfl: 1    metFORMIN (GLUCOPHAGE) 500 MG tablet, TAKE 1 TABLET BY MOUTH ONCE DAILY, Disp: 90 tablet, Rfl: 3    methylPREDNISolone (MEDROL DOSEPACK) 4 mg tablet, use as directed, Disp: 1 each, Rfl: 0    metoprolol tartrate (LOPRESSOR) 100 MG tablet, Take 1 tablet (100 mg total) by mouth 2 (two) times daily., Disp: 180 tablet, Rfl: 1    montelukast (SINGULAIR) 10 mg tablet, TAKE 1 TABLET BY MOUTH ONCE A DAY, Disp: 90 tablet, Rfl: 3    MYRBETRIQ 50 mg Tb24, TAKE 1 TABLET BY MOUTH ONCE DAILY, Disp: 30 tablet, Rfl: 10    nitroGLYCERIN (NITROSTAT) 0.4 MG SL tablet, DISSOLVE 1 TABLET UNDER TONGUE EVERY 5 MINUTES FOR CHEST PAIN (MAY TAKE UP TO 3 DOSES THEN SEEK MEDICAL ATTENTION), Disp: 25 tablet, Rfl: 0    omeprazole (PRILOSEC) 40 MG capsule, Take 40 mg by mouth every morning., Disp: , Rfl:     pantoprazole (PROTONIX) 40 MG tablet, Take 1 tablet (40 mg total) by mouth 2 (two) times daily., Disp: 60 tablet, Rfl: 11    potassium chloride SA (K-DUR,KLOR-CON) 20 MEQ tablet, Take 1 tablet (20 mEq total) by mouth once daily., Disp: 90 tablet, Rfl: 1    predniSONE (DELTASONE) 20 MG tablet, TAKE 3 TABLETS DAILY FOR 3 DAYS then 2 TABLETS DAILY FOR 3 DAYS then 1 TABLET DAILY FOR 3 DAYS then 1/2 TABLET DAILY FOR 4 DAYS, Disp: 20 tablet, Rfl: 0    promethazine-dextromethorphan (PROMETHAZINE-DM) 6.25-15 mg/5 mL Syrp, Take by mouth., Disp: , Rfl:     ranolazine (RANEXA) 1,000 mg Tb12, Take 1 tablet (1,000 mg total) by mouth 2 (two) times daily., Disp: 180 tablet, Rfl: 3    tiZANidine  (ZANAFLEX) 4 MG tablet, TAKE 1 TABLET BY MOUTH TWICE DAILY AS NEEDED, Disp: 60 tablet, Rfl: 0    triamcinolone acetonide 0.025% (KENALOG) 0.025 % cream, Apply topically 2 (two) times daily. PRN rash and itching of ear. Mild steroid cream., Disp: 30 g, Rfl: 0    valsartan (DIOVAN) 320 MG tablet, Take 1 tablet (320 mg total) by mouth once daily., Disp: 30 tablet, Rfl: 3    nortriptyline (PAMELOR) 25 MG capsule, Take 1 capsule (25 mg total) by mouth every evening., Disp: 90 capsule, Rfl: 0  Past Medical History:   Diagnosis Date    Abnormal ECG 8/5/2019    Aneurysm     Anticoagulant long-term use     Arthritis     CAD in native artery 8/5/2019    COPD (chronic obstructive pulmonary disease)     Diabetes mellitus     Fall 10/10/2019    Formatting of this note might be different from the original. Found sitting on floor next to bed last night Mild confusion today Does not recall falling or how she ended up on floor UA today Labs yesterday unremarkable    Glaucoma     Hypertension     Seizures     Shingles 05/27/2017    Stroke      Past Surgical History:   Procedure Laterality Date    BRAIN SURGERY      CARDIAC CATHETERIZATION      CORONARY ANGIOPLASTY      EPIDURAL STEROID INJECTION INTO LUMBAR SPINE Bilateral 11/12/2019    Procedure: TF XANDER L4/5;  Surgeon: Desean Dias MD;  Location: [facility];  Service: Pain Management;  Laterality: Bilateral;    HYSTERECTOMY      INJECTION OF ANESTHETIC AGENT AROUND MEDIAL BRANCH NERVES INNERVATING LUMBAR FACET JOINT Bilateral 1/31/2020    Procedure: Bilateral L3-5 MBB;  Surgeon: Desean Dias MD;  Location: [facility];  Service: Pain Management;  Laterality: Bilateral;    INJECTION OF ANESTHETIC AGENT INTO SACROILIAC JOINT Right 11/16/2021    Procedure: Right BLOCK, SACROILIAC JOINT Right GTB with RN IV sedation;  Surgeon: Yao Fulton MD;  Location: [facility];  Service: Pain Management;  Laterality: Right;    INJECTION OF JOINT Bilateral 7/9/2020    Procedure:  Bilateral shoulder GH Joint injection with local;  Surgeon: Desean Dias MD;  Location: [facility];  Service: Pain Management;  Laterality: Bilateral;    TRANSFORAMINAL EPIDURAL INJECTION OF STEROID Bilateral 2019    Procedure: Bilateral L3/4 Transforaminal Epidural Steroid Injection;  Surgeon: Desean Dias MD;  Location: [facility];  Service: Pain Management;  Laterality: Bilateral;    TRANSFORAMINAL EPIDURAL INJECTION OF STEROID Bilateral 3/10/2020    Procedure: Right T12/L1 TF XANDER with local;  Surgeon: Desean Dias MD;  Location: [facility];  Service: Pain Management;  Laterality: Bilateral;    TRANSFORAMINAL EPIDURAL INJECTION OF STEROID Right 2020    Procedure: Right T12/L1 TFESI Covid day of procedure;  Surgeon: Desean Dias MD;  Location: [facility];  Service: Pain Management;  Laterality: Right;     Social History     Socioeconomic History    Marital status: Single   Tobacco Use    Smoking status: Former     Packs/day: 1.00     Years: 42.00     Pack years: 42.00     Types: Cigarettes     Start date:      Quit date: 2004     Years since quittin.9    Smokeless tobacco: Never   Substance and Sexual Activity    Alcohol use: No    Drug use: Never    Sexual activity: Not Currently     Partners: Male   Social History Narrative    No pets or smokers in household.             Past/Current Medical/Surgical History, Past/Current Social History, Past/Current Family History and Past/Current Medications were reviewed in detail.        Objective:     VITAL SIGNS WERE REVIEWED      GENERAL APPEARANCE:     The patient looks comfortable.    BMI 23.58    No signs of respiratory distress.    Normal breathing pattern.    No dysmorphic features    Normal eye contact.     GENERAL MEDICAL EXAM:    HEENT:  Head is atraumatic normocephalic.     Neck and Axillae: No JVD. No visible lesions.    Cardiopulmonary: No cyanosis. No tachypnea. Normal respiratory  effort.    Gastrointestinal/Urogenital:  No jaundice. No stomas or lesions. No visible hernias. No catheters.     Skin, Hair and Nails: No pathognonomic skin rash. No neurofibromatosis. No visible lesions.No stigmata of autoimmune disease. No clubbing.    Limbs: No varicose veins. No visible swelling.    Muskoskeletal: No visible deformities.No visible lesions.           Neurologic Exam     Mental Status   Oriented to person, place, and time.   Follows 3 step commands.   Attention: normal. Concentration: normal.   Speech: speech is normal   Level of consciousness: alert  Knowledge: good and consistent with education.   Able to name object. Able to repeat. Normal comprehension.     10- MOCA 26/30 (1 pointed added; highest level of education 10th grade)    Visuospatial/Executive 2/5    Naming                            3/3    Attention                         5/6    Language                         3/3    Abstraction                    2/2    Recall                                4/5 (recalled 5th with clue)    Orientation                     6/6        NORMAL-MILD NCD 26-30       Cranial Nerves     CN II   Visual acuity: decreased  Right visual field deficit: none  Left visual field deficit: upper temporal and upper nasal quadrant(s)    CN III, IV, VI   Pupils are equal, round, and reactive to light.  Extraocular motions are normal.   Right pupil: Size: 2 mm. Shape: regular. Reactivity: brisk. Consensual response: intact. Accommodation: intact.   Left pupil: Size: 2 mm. Shape: regular. Reactivity: brisk. Consensual response: intact. Accommodation: intact.   CN III: no CN III palsy  CN VI: no CN VI palsy  Nystagmus: none   Diplopia: none  Ophthalmoparesis: none  Upgaze: normal  Downgaze: normal  Conjugate gaze: present  Vestibulo-ocular reflex: present    CN V   Facial sensation intact.   Right facial sensation deficit: none  Left facial sensation deficit: none (pain with stroking forehead)    CN VII   Facial  expression full, symmetric.   Right facial weakness: none  Left facial weakness: none    CN VIII   Hearing: impaired    CN IX, X   CN IX normal.   CN X normal.   Palate: symmetric    CN XI   CN XI normal.   Right sternocleidomastoid strength: normal  Left sternocleidomastoid strength: normal  Right trapezius strength: normal  Left trapezius strength: normal    CN XII   CN XII normal.   Tongue: not atrophic  Fasciculations: absent  Tongue deviation: none    Motor Exam   Muscle bulk: decreased  Right arm tone: normal  Left arm tone: increased  Right leg tone: normal  Left leg tone: normal    Strength   Right neck flexion: 5/5  Left neck flexion: 4/5  Right neck extension: 5/5  Left neck extension: 4/5  Right deltoid: 5/5  Left deltoid: 4/5  Right biceps: 5/5  Left biceps: 4/5  Right triceps: 5/5  Left triceps: 4/5  Right wrist flexion: 5/5  Left wrist flexion: 4/5  Right wrist extension: 5/5  Left wrist extension: 4/5  Right interossei: 5/5  Left interossei: 4/5  Right iliopsoas: 5/5  Left iliopsoas: 5/5  Right quadriceps: 5/5  Left quadriceps: 5/5  Right hamstrin/5  Left hamstrin/5  Right glutei: 5/5  Left glutei: 5/5  Right anterior tibial: 5/5  Left anterior tibial: 5/5  Right posterior tibial: 5/5  Left posterior tibial: 5/5  Right peroneal: 5/5  Left peroneal: 5/5  Right gastroc: 5/5  Left gastroc: 5/5    Sensory Exam   Right arm light touch: decreased from wrist  Left arm light touch: decreased from wrist  Right leg light touch: decreased from ankle  Left leg light touch: decreased from ankle  Right arm vibration: normal  Left arm vibration: normal  Right leg vibration: decreased from toes  Left leg vibration: decreased from toes  Right arm proprioception: normal  Left arm proprioception: normal  Right leg proprioception: decreased from toes  Left leg proprioception: decreased from toes  Right arm pinprick: decreased from wrist  Left arm pinprick: decreased from wrist  Right leg pinprick: decreased from  ankle  Left leg pinprick: decreased from ankle  Graphesthesia: normal  Stereognosis: normal    Gait, Coordination, and Reflexes     Gait  Gait: non-neurologic (Antalgic)    Coordination   Finger to nose coordination: normal  Heel to shin coordination: normal    Tremor   Resting tremor: absent  Intention tremor: absent  Action tremor: absent    Reflexes   Right brachioradialis: 1+  Left brachioradialis: 1+  Right biceps: 1+  Left biceps: 1+  Right triceps: 1+  Left triceps: 1+  Right patellar: 1+  Left patellar: 1+  Right achilles: 0  Left achilles: 0  Right plantar: normal  Left plantar: normal  Right Thompson: absent  Left Thmopson: absent  Right ankle clonus: absent  Left ankle clonus: absent  Right pendular knee jerk: absent  Left pendular knee jerk: absent Antalgic gait     Lab Results   Component Value Date    WBC 10.45 01/06/2022    HGB 12.1 01/06/2022    HCT 37.0 01/06/2022    MCV 95 01/06/2022     01/06/2022     Sodium   Date Value Ref Range Status   11/17/2022 135 (L) 136 - 145 mmol/L Final     Potassium   Date Value Ref Range Status   11/17/2022 3.6 3.5 - 5.1 mmol/L Final     Chloride   Date Value Ref Range Status   11/17/2022 100 95 - 110 mmol/L Final     CO2   Date Value Ref Range Status   11/17/2022 22 (L) 23 - 29 mmol/L Final     Glucose   Date Value Ref Range Status   11/17/2022 125 (H) 70 - 110 mg/dL Final     BUN   Date Value Ref Range Status   11/17/2022 15 8 - 23 mg/dL Final     Creatinine   Date Value Ref Range Status   11/17/2022 0.8 0.5 - 1.4 mg/dL Final     Calcium   Date Value Ref Range Status   11/17/2022 10.4 8.7 - 10.5 mg/dL Final     Total Protein   Date Value Ref Range Status   11/17/2022 7.5 6.0 - 8.4 g/dL Final     Albumin   Date Value Ref Range Status   11/17/2022 3.4 (L) 3.5 - 5.2 g/dL Final     Total Bilirubin   Date Value Ref Range Status   11/17/2022 0.4 0.1 - 1.0 mg/dL Final     Comment:     For infants and newborns, interpretation of results should be based  on gestational  age, weight and in agreement with clinical  observations.    Premature Infant recommended reference ranges:  Up to 24 hours.............<8.0 mg/dL  Up to 48 hours............<12.0 mg/dL  3-5 days..................<15.0 mg/dL  6-29 days.................<15.0 mg/dL       Alkaline Phosphatase   Date Value Ref Range Status   11/17/2022 62 55 - 135 U/L Final     AST   Date Value Ref Range Status   11/17/2022 18 10 - 40 U/L Final     ALT   Date Value Ref Range Status   11/17/2022 13 10 - 44 U/L Final     Anion Gap   Date Value Ref Range Status   11/17/2022 13 8 - 16 mmol/L Final     eGFR if    Date Value Ref Range Status   06/02/2022 >60 >60 mL/min/1.73 m^2 Final     eGFR if non    Date Value Ref Range Status   06/02/2022 >60 >60 mL/min/1.73 m^2 Final     Comment:     Calculation used to obtain the estimated glomerular filtration  rate (eGFR) is the CKD-EPI equation.        Lab Results   Component Value Date    [id number] 583 10/27/2021     Lab Results   Component Value Date    TSH 1.249 08/09/2021    [id number] 5.9 10/27/2021     AED: Eslicarbazepine ESL  NORMAL LEVEL RANGE:   (2 -28)   DATE 10- LEVEL 24.9   04-  18.7 NL                            03-    CTH Bilateral craniotomy changes in the frontal and temporal regions.  Aneurysm clip present in the floor of the left middle cranial fossa.  Old appearing lacunar infarct in the anterior limb of the right internal capsule.         [id number]    CT Spine Multilevel DDD      08-    ESR is NL for her age and improving 80>67>58     Unlikely GCA/TA.     08- (Interpreted 08-)    EEG NL       09- through 10-     Interpreted 10-     92 hours AEEG    B/L TLE, F8-T8, F7-T7    10-     CTA H/N Stable     Vit B12, MMA, folate, HC, T4, and RPR unremarkable     No evidence of acute intracranial hemorrhage.Bilateral craniotomies with LT MCA aneurysm clip.No evidence of recurrent or residual  aneurysm sac.  No other aneurysms    6-2-2022    CT sinuses shows postoperative changes from prior bilateral internal craniotomies.  Aneurysm clip within the left MCA distribution.     Reviewed the neuroimaging independently       Assessment:       1. Sleep apnea, unspecified type    2. Temporal lobe epilepsy    3. Mild cognitive impairment    4. Postherpetic neuralgia    5. History of cerebral aneurysm repair    6. Dizziness and giddiness    7. Postnasal drip    8. Multiple neurological symptoms    9. Cough, unspecified type    10. Nausea    11. History of stroke    12. Hx of craniotomy    13. Temporal lobe seizure    14. Hx of completed stroke    15. Depression, unspecified depression type            EPILEPSY CLASSIFICATION        SEMIOLOGY: unknown     EPILEPTOGENIC ZONE (S): Bilateral Temporal Lobes     ETIOLOGY: Stroke, aneurysms      PRIOR AEDS:  Dilantin, GBP    CURRENT AEDS: Aptiom     LAST SEIZURE DATE: March 31 2023        COMPREHENSIVE LIST OF AEDs:      Acetazolamide (AZM-Diamox)   Benzos: clonazepam (CZP Klonopin), lorazepam (LZP-Ativan), diazepam (DZP-Valium), clorazepate (CLZ- Tranxene)  Brivaracetam (BRV-Briviact)  Cannabidiol (CBD- Epidiolex)  Carbamazepine (CBZ-Tegretol)  Cenobamate (CNB-Xcopri)  Clobazam (CLB-Onfi)  Eslicarbazepine (ESL-Aptiom)  Ethosuximide (ESX-Zarontin)  Felbamate (FBM-Felbatol)  Gabapentin (GBP-Neurontin)  Lacosamide (LCM-Vimpat)  Lamotrigine (LTG-Lamitcal)  Levetiracetam (LEV- Keppra)  Oxcarbazepine (OXC-Trileptal)  Perampanel (PML-Fycompa)  Phenobarbital (PB)  Phenytoin (PHT-Dilantin)  Pregabalin (PGB-Lyrica)  Primidone (PRM)   Retigabine (RTG- Potiga) Discontinued in 2017  Rufinamide (RFN-Benzil)  Stiripentol (STP-Diacomit)  Tiagabine (TGB-Gabitril)  Topiramate (TPM-Topamax)  Valproate (VPA-Depakote)  Vigabatrin (VGB-Sabril)  Zonisamide (ZNS-Zonegran)      Modified Earlham Score (m-RS)      0  The patient has no residual symptoms.    1  The patient has no significant  disability; able to carry out all pre-stroke activities.    2  The patient has slight disability; unable to carry out all pre-stroke activities but able to look after self without daily help.    3  The patient has moderate disability; requiring some external help but able to walk without the assistance of another individual.    4  The patient has moderately severe disability; unable to walk or attend to bodily functions without assistance of another individual.    5  The patient has severe disability; bedridden, incontinent, requires continuous care.    6  The patient has  (during the hospital stay or after discharge from the hospital).    Plan:         TEMPORAL LOBE EPILEPSY     Continue ESL 1200 mg QD, Check ESL level      The patient was encouraged to maintain full traditional seizure precautions which include but not limited to avoid driving, avoid high altitudes, avoid being close to fire or fire source, avoid being close to a body of water or swimming alone, avoid operating heavy machinery and avoid using sharp objects if possible. The patient was encouraged to shower (without accumulation of water) instead of taking a bath if unsupervised. The patient was made aware that these precautions are especially important during concurrent illness. Adequate sleep and avoidance of alcohol as important measures to assure good seizure control were discussed with the patient. The patient was also advised not to care for children without company. The patient was advised to pad the side rails with pillows and blankets if applicable.I strongly recommended lowering the bed to the floor level to decrease the risk of falls.       Made the patient aware that the duration of restrictions/precautions varies and in [place] it is 6 months. The patient verbalized  full understanding.                              Continue AEDs INDEFINITELY, FOR GOOD AND NEVER SKIP A DOSE. The patient verbalized full understanding. Stressed the  extreme medical, personal safety, public safety and legal importance of compliance and seizure control. Will give as many refills as possible with or without face-to-face evaluation to make sure the patient and any patient with epilepsy will never run out of medications. Running out of seizure medications should never happen under our care. I explained to the patient that he should not, under any circumstances, adjust or stop taking his seizure medication without discussing with me. The patient verbalized full understanding.       Explained to the patient that if 2 AEDs fail to control the seizures the likelihood of AEDs alone to control the seizures is <10% and other other options should be pursued.      AVOID any substance that could lower seizure threshold including but not limited to:      ALCOHOL AND WITHDRAWAL      TRAMADOL.     DEMEROL      ALL STIMULANTS-ALL ADHD MEDICATIONS.      CLOZAPINE.      BUPROPION.     CIPROFLOXACIN        SEIZURE FREEDOM DEFINITION: No seizures for 1 year or for 3 times the interval between the last 2 seizures whichever is longer (Some studies suggest 6 times even)!      HISTORY OF LACUNAR RT PLIC STROKES    Continue Plavix.     Vascular Risk Factors (VRFs) stratification (BP, BS, BC control and Smoking Cessation) is the mainstay of stroke prevention (>90%)..       Healthy diet and exercise    Avoid driving.    Call 911 if any SUDDEN:   Weakness  Numbness  Slurring of speech  Speech difficulty   Vertigo  Loss of balance  Loss of vision  Loss of hearing  Double vision  Trouble swallowing  Trouble breathing  Facial drooping        HISTORY OF MULTIPLE ANEURYSMS S/P CRANIOTOMY     Call 911 for the worst head (thunderclap headache).      LT PHN    Continue ESL 1200 mg QD.    Refill Neuropathic compound cream         MILD COGNITIVE IMPAIRMENT    Defer Comprehensive Neuropsychological Testing.    Clinically [facility]    Falling Down Precautions.    Avoid driving and access to firearms      Help with finances and decision making.    Join support group.    Proofing the house and use labeling.    Avoid antihistamines and anticholinergics.    Avoid changing routine.    Use written reminders.    Avoid multitasking.    Healthy diet, exercise (physical and cognitive).    Good sleep hygiene.      SLEEP APENA    Refer PULMLONGIST         PREVENTION OF DELIRIUM       1. Good hydration and avoid electrolyte imbalance  2. Recognize andtreat infections immediately especially UTI.  3. Bladder emptying and prevent constipation.   4. Provide stimulating activities and familiar objects  5. Use eyeglasses and hearing aids if needed.   6. Use simple and regular communication about people, current place and time  7. Mobility and range-of-motion exercises  8. Reduce noise, lighting and avoid sleep interruptions  9. Non-narcotic pain management.  10.Nondrug treatment for sleep problems or anxiety  11. Avoid antihistamines.  12. Avoid narcotics.  13. Avoid benzodiazepines.       DIZZINESS    Improved    SLEEP APNEA     Refer to Pulmonology              MEDICAL/SURGICAL COMORBIDITIES     All relevant medical comorbidities noted and managed by primary care physician and medical care team.          MISCELLANEOUS MEDICAL PROBLEMS       HEALTHY LIFESTYLE AND PREVENTATIVE CARE    The patient to adhere to the age-appropriate health maintenance guidelines including screening tests and vaccinations. The patient to adhere to  healthy lifestyle, optimal weight, exercise, healthy diet, good sleep hygiene and avoiding drugs including smoking, alcohol and recreational drugs.      I spent a total of 50 minutes on the day of the visit.  This includes face to face time and non-face to face time preparing to see the patient (eg, review of tests), obtaining and/or reviewing separately obtained history, documenting clinical information in the electronic or other health record, independently interpreting results and communicating results to  the patient/family/caregiver, or care coordinator.      RTC in 3 months          Joey Contreras, MSN NP      Collaborating Provider: Emerita Simpson MD, FAAN Neurologist/Epileptologist

## 2023-04-05 NOTE — TELEPHONE ENCOUNTER
Returned call to Rm Ojeda in regards to Shireen livingstonzanidine.  Informed the brother that a 6 month supply was called in today.  He verbalized understanding.

## 2023-04-11 ENCOUNTER — TELEPHONE (OUTPATIENT)
Dept: PAIN MEDICINE | Facility: CLINIC | Age: 78
End: 2023-04-11
Payer: MEDICARE

## 2023-04-11 ENCOUNTER — PATIENT MESSAGE (OUTPATIENT)
Dept: PAIN MEDICINE | Facility: CLINIC | Age: 78
End: 2023-04-11
Payer: MEDICARE

## 2023-04-11 ENCOUNTER — TELEPHONE (OUTPATIENT)
Dept: NEUROLOGY | Facility: CLINIC | Age: 78
End: 2023-04-11
Payer: MEDICARE

## 2023-04-11 NOTE — TELEPHONE ENCOUNTER
----- Message from Mago Wilson LPN sent at 4/10/2023  5:11 PM CDT -----  Contact: Shireen    ----- Message -----  From: Abdiel Willard  Sent: 4/10/2023   4:22 PM CDT  To: Ja Thapa Staff    Patient is calling to speak with the nurse to get clarifications on appts, please call [phone]

## 2023-04-11 NOTE — TELEPHONE ENCOUNTER
----- Message from Ken Granados sent at 4/11/2023  9:37 AM CDT -----  Contact: [phone]  Calling requesting fax status that was sent last week for prescription of pt  . Please call back at [phone] . Thanksdj

## 2023-04-11 NOTE — TELEPHONE ENCOUNTER
----- Message from Serene Blount sent at 4/11/2023  8:28 AM CDT -----  Regarding: patient returning call  Contact: patient  Type:  Patient Returning Call    Who Called: IZABELLA SHEA [[id number]]    Who Left Message for Patient: NURSE CEBALLOS    Does the patient know what this is regarding? YES    Best Call Back Number: [phone] or [phone]    Additional Information: Patient is requesting a call back. Please advise!

## 2023-04-11 NOTE — TELEPHONE ENCOUNTER
Pain Management Pre-Procedure Instructions  (also available in your [employer] account)    Patient Name:___Shireen Felix____MRN: [id number] you are scheduled to have the following procedure:__ Epidural Steroid Injection  _with_Francisco Oshea MD on: _Back (04/26) and Neck (05/10) at: [facility]    Follow up appointment with Alanis Gagnoningsley is at the [facility] on 06/05/2023 @ 8:20.                                                                                                          HOLD YOUR PLAVIX 5 DAYS PRIOR TO BOTH INJECTIONS   Day of Procedure  Ensure you have obtained arrival time from the Pain Management department    The surgery team will call 24-48 hours in advance with your arrival time. Please check any voicemails you may have, We DO NOT have access to the arrival times.     If you arrive past your scheduled procedure time, you may be asked to reschedule your procedure.  For your safety, ensure you have a  with you to remain present throughout your procedure   If you arrive without a responsible adult to stay with you and drive you home, you may be asked to reschedule your procedure  Take all of your prescribed medications (exceptions noted below) with a small amount of water  HOLD: Diabetic medications (Metformin) the morning of your procedure.  [x] Nothing by mouth after midnight the night before your procedure.  It is ok to take your regular medications with a small sip of water.    Wear loose, comfortable clothing   You may wear glasses, dentures, contact lenses and/or hearing aids. Please leave all valuable items at home.  Contact the Pain Management department at [phone] or via [employer] if you are:  Running a fever above 100 degrees  Feel ill, have any type of infection, or are taking antibiotics now or have in the past 2 weeks  Have had any outpatient procedures in the past 2 weeks (colonoscopy, major dental work, etc.)  If you are allergic to iodine, IVP dye or  shellfish.  If you need to reschedule or have any questions in regards to the procedure please call [phone] before 4pm.    PLEASE WAIT AT LEAST 7 DAYS AFTER RECEIVING ALL VACCINES (FLU, SHINGLES, ETC.) TO COME IN FOR YOUR PROCEDURES. IF YOU HAVE RECEIVED THE FIRST DOSE OF COVID VACCINE BUT NOT THE 2ND DOSE, PLEASE NOTIFY THE PAIN DEPARTMENT. FOR THOSE WHO QUALIFY FOR BOOSTER COVID VACCINE, MAY RECEIVE 7 DAYS PRIOR TO PROCEDURE OR TWO WEEKS AFTER PROCEDURE. THIS AVOIDS ANY PROBLEMS WITH YOUR IMMUNE SYSTEM AND IMMUNE RESPONSE TO THE VACCINE.      Contact Information: [phone], ask to speak to the pain management department with any questions or concerns or send a message via [facility]

## 2023-04-12 ENCOUNTER — TELEPHONE (OUTPATIENT)
Dept: NEUROLOGY | Facility: CLINIC | Age: 78
End: 2023-04-12
Payer: MEDICARE

## 2023-04-12 ENCOUNTER — TELEPHONE (OUTPATIENT)
Dept: PAIN MEDICINE | Facility: CLINIC | Age: 78
End: 2023-04-12
Payer: MEDICARE

## 2023-04-12 DIAGNOSIS — G47.30 SLEEP APNEA, UNSPECIFIED TYPE: Primary | ICD-10-CM

## 2023-04-12 LAB — ESLICARBAZEPINE: 18.7 MCG/ML

## 2023-04-12 NOTE — TELEPHONE ENCOUNTER
Advised patient that I sent a message to the sleep lab clinical staff. In regards to scheduling her for the sleep apnea test. Patient did verbalized understanding and stated she would wait to hear from them.

## 2023-04-12 NOTE — TELEPHONE ENCOUNTER
----- Message from Lamin Granados sent at 4/12/2023 11:03 AM CDT -----  Contact: Shireen  Normanrosa is needing a call back in regards to her medication. Please give her a call back at [phone]

## 2023-04-12 NOTE — TELEPHONE ENCOUNTER
----- Message from Lamin Grandaos sent at 4/12/2023 11:10 AM CDT -----  Contact: Shireen Iyer is needing call back in regards to a referral for a sleep apnea test. Please give her a call back at [phone]

## 2023-04-12 NOTE — TELEPHONE ENCOUNTER
Good morning,     Can someone reach out to patient in regards to scheduling an Sleep apnea test. Patient is scheduled for some test coming up but wants to know if its the sleep apnea test. Please advise.       Thank you.

## 2023-04-14 ENCOUNTER — TELEPHONE (OUTPATIENT)
Dept: PULMONOLOGY | Facility: CLINIC | Age: 78
End: 2023-04-14
Payer: MEDICARE

## 2023-04-14 ENCOUNTER — TELEPHONE (OUTPATIENT)
Dept: PAIN MEDICINE | Facility: CLINIC | Age: 78
End: 2023-04-14
Payer: MEDICARE

## 2023-04-14 NOTE — TELEPHONE ENCOUNTER
----- Message from Evette Marrero sent at 4/14/2023  2:33 PM CDT -----  Contact: pt  Pt is calling in regard to her back pain and need something prescribe for the pain.  Pt states she has called two time already now.  Please call her back at [phone] thanks/mpd      [facility] - [place] - [address]  [address]  [place] [address]  Phone: [phone] Fax: [phone]

## 2023-04-14 NOTE — TELEPHONE ENCOUNTER
"Pt has not picked up Zanaflex,   Pt states "let me ask my brother if he think that will help me"   Pt state her brother knows what she can take, he states when she takes it she has to eat first    Pt requesting ibuprofen 800mg.     Pt having procedure 4/26/23 and 5/10/23    "

## 2023-04-18 ENCOUNTER — TELEPHONE (OUTPATIENT)
Dept: PAIN MEDICINE | Facility: CLINIC | Age: 78
End: 2023-04-18
Payer: MEDICARE

## 2023-04-18 NOTE — TELEPHONE ENCOUNTER
----- Message from Evelyn Peres PA-C sent at 4/17/2023  7:44 AM CDT -----  She has severe spinal stenosis in her lumbar spine and severe carpal tunnel syndrome as well as cervical radiculopathy. She saw Dr. Navarro on 3/21 and received injections in both wrists and was advised to return to his clinic in 3 weeks if pain returned. Is the pain primarily in her hands and wrists or her neck or back? In my note I have follow up with Dr. Oshea after injections as I'm not sure what other options she may have due to the severity of her stenosis and comorbidities. I agree Ibuprofen is out due to her being on blood thinners.     Evelyn  ----- Message -----  From: Francisco Oshea MD  Sent: 4/14/2023   7:32 AM CDT  To: Evelyn Peres PA-C, Keke Alves MA    Pt seen by evelyn rodriguez. Can you take a look please Evelyn?  ----- Message -----  From: Keke Alves MA  Sent: 4/13/2023   5:06 PM CDT  To: MD Dr. Dayo Snyder,  Pt is asking for something for pain until her procedure of 4/26. Please advise.  ----- Message -----  From: Pauline Hsieh  Sent: 4/13/2023   4:40 PM CDT  To: Dayo Singh Staff    Pt would like a call back at [phone].She is hurting real bad.Thanks

## 2023-04-18 NOTE — TELEPHONE ENCOUNTER
----- Message from Candelaria Villarreal sent at 4/18/2023  1:41 PM CDT -----  Contact: Shireen Iyer stated the pharmacy does not have the Ibuprofen 800mg that was to have been called in for her. Please call her at [phone] once it is sent.     Please send it to:   [facility] [place]  [address]  [address]  [place] [address]  Phone: [phone] Fax: [phone]

## 2023-04-18 NOTE — TELEPHONE ENCOUNTER
Call pt regarding wanting pain relief medication until procedure on 4/26/23. Pt was vm is full unable to leave message.  .Keke CAMPBELL

## 2023-04-18 NOTE — PRE-PROCEDURE INSTRUCTIONS
Spoke with patients brother regarding procedure scheduled on 4.26     Arrival time 1100     Has patient been sick with fever or on antibiotics within the last 7 days? No     Does the patient have any open wounds, sores or rashes? No     Does the patient have any recent fractures? no     Has patient received a vaccination within the last 7 days? No     Received the COVID vaccination? yes     Has the patient stopped all medications as directed? Hold dm meds am of procedure. Hold plavix 5 days prior to procedure. Cardiac clearance obtained from dr cesar on 3.10.     Does patient have a pacemaker and or defibrillator? no     Does the patient have a ride to and from procedure and someone reliable to remain with patient? brother serjio     Is the patient diabetic? yes     Does the patient have sleep apnea? Or use O2 at home? no     Is the patient receiving sedation? yes     Is the patient instructed to remain NPO beginning at midnight the night before their procedure? yes     Procedure location confirmed with patient? Yes     Covid- Denies signs/symptoms. Instructed to notify PAT/MD if any changes.

## 2023-04-26 ENCOUNTER — OFFICE VISIT (OUTPATIENT)
Dept: PULMONOLOGY | Facility: CLINIC | Age: 78
End: 2023-04-26
Payer: MEDICARE

## 2023-04-26 ENCOUNTER — HOSPITAL ENCOUNTER (OUTPATIENT)
Facility: HOSPITAL | Age: 78
Discharge: HOME | End: 2023-04-26
Attending: ANESTHESIOLOGY | Admitting: ANESTHESIOLOGY
Payer: MEDICARE

## 2023-04-26 ENCOUNTER — HOSPITAL ENCOUNTER (OUTPATIENT)
Dept: RADIOLOGY | Facility: HOSPITAL | Age: 78
Discharge: HOME | End: 2023-04-26
Attending: NURSE PRACTITIONER
Payer: MEDICARE

## 2023-04-26 ENCOUNTER — CLINICAL SUPPORT (OUTPATIENT)
Dept: PULMONOLOGY | Facility: CLINIC | Age: 78
End: 2023-04-26
Payer: MEDICARE

## 2023-04-26 VITALS
HEIGHT: 64 IN | SYSTOLIC BLOOD PRESSURE: 173 MMHG | DIASTOLIC BLOOD PRESSURE: 81 MMHG | BODY MASS INDEX: 21.21 KG/M2 | BODY MASS INDEX: 21.22 KG/M2 | WEIGHT: 124.31 LBS | OXYGEN SATURATION: 100 % | HEART RATE: 61 BPM | WEIGHT: 124.25 LBS | HEIGHT: 64 IN | RESPIRATION RATE: 18 BRPM

## 2023-04-26 VITALS
OXYGEN SATURATION: 97 % | DIASTOLIC BLOOD PRESSURE: 77 MMHG | HEART RATE: 68 BPM | HEIGHT: 64 IN | WEIGHT: 124.31 LBS | RESPIRATION RATE: 18 BRPM | SYSTOLIC BLOOD PRESSURE: 190 MMHG | BODY MASS INDEX: 21.22 KG/M2 | TEMPERATURE: 98 F

## 2023-04-26 DIAGNOSIS — G40.109 TEMPORAL LOBE EPILEPSY: ICD-10-CM

## 2023-04-26 DIAGNOSIS — J45.40 MODERATE PERSISTENT ASTHMA WITHOUT COMPLICATION: Primary | ICD-10-CM

## 2023-04-26 DIAGNOSIS — G47.30 SLEEP APNEA, UNSPECIFIED TYPE: ICD-10-CM

## 2023-04-26 DIAGNOSIS — Z98.890 HX OF CRANIOTOMY: ICD-10-CM

## 2023-04-26 DIAGNOSIS — K21.9 GASTROESOPHAGEAL REFLUX DISEASE, UNSPECIFIED WHETHER ESOPHAGITIS PRESENT: ICD-10-CM

## 2023-04-26 DIAGNOSIS — J41.1 MUCOPURULENT CHRONIC BRONCHITIS: Chronic | ICD-10-CM

## 2023-04-26 DIAGNOSIS — R06.02 SOB (SHORTNESS OF BREATH) ON EXERTION: ICD-10-CM

## 2023-04-26 DIAGNOSIS — F51.01 PRIMARY INSOMNIA: ICD-10-CM

## 2023-04-26 DIAGNOSIS — Z86.79 HISTORY OF CEREBRAL ANEURYSM REPAIR: ICD-10-CM

## 2023-04-26 DIAGNOSIS — J30.9 CHRONIC ALLERGIC RHINITIS: ICD-10-CM

## 2023-04-26 DIAGNOSIS — G47.30 SLEEP-DISORDERED BREATHING: ICD-10-CM

## 2023-04-26 DIAGNOSIS — N28.1 BILATERAL RENAL CYSTS: ICD-10-CM

## 2023-04-26 DIAGNOSIS — J84.10 PULMONARY INTERSTITIAL FIBROSIS: ICD-10-CM

## 2023-04-26 DIAGNOSIS — R91.8 MULTIPLE PULMONARY NODULES: ICD-10-CM

## 2023-04-26 DIAGNOSIS — R06.83 SNORING: ICD-10-CM

## 2023-04-26 DIAGNOSIS — R53.83 FEELING TIRED: ICD-10-CM

## 2023-04-26 DIAGNOSIS — R40.0 DAYTIME SLEEPINESS: ICD-10-CM

## 2023-04-26 DIAGNOSIS — Z98.890 HISTORY OF CEREBRAL ANEURYSM REPAIR: ICD-10-CM

## 2023-04-26 DIAGNOSIS — Z86.73 HISTORY OF STROKE: ICD-10-CM

## 2023-04-26 DIAGNOSIS — J84.9 INTERSTITIAL PULMONARY DISEASE, UNSPECIFIED: ICD-10-CM

## 2023-04-26 PROBLEM — M54.16 LUMBAR RADICULOPATHY, CHRONIC: Status: ACTIVE | Noted: 2023-04-26

## 2023-04-26 LAB — POCT GLUCOSE: 90 MG/DL (ref 70–110)

## 2023-04-26 PROCEDURE — 3077F SYST BP >= 140 MM HG: CPT | Mod: CPTII,S$GLB,, | Performed by: NURSE PRACTITIONER

## 2023-04-26 PROCEDURE — 1101F PR PT FALLS ASSESS DOC 0-1 FALLS W/OUT INJ PAST YR: ICD-10-PCS | Mod: CPTII,S$GLB,, | Performed by: NURSE PRACTITIONER

## 2023-04-26 PROCEDURE — 99215 PR OFFICE/OUTPT VISIT, EST, LEVL V, 40-54 MIN: ICD-10-PCS | Mod: 25,S$GLB,, | Performed by: NURSE PRACTITIONER

## 2023-04-26 PROCEDURE — 3288F PR FALLS RISK ASSESSMENT DOCUMENTED: ICD-10-PCS | Mod: CPTII,S$GLB,, | Performed by: NURSE PRACTITIONER

## 2023-04-26 PROCEDURE — 71250 CT THORAX DX C-: CPT | Mod: 26,,, | Performed by: RADIOLOGY

## 2023-04-26 PROCEDURE — 71250 CT CHEST WITHOUT CONTRAST: ICD-10-PCS | Mod: 26,,, | Performed by: RADIOLOGY

## 2023-04-26 PROCEDURE — 64483 NJX AA&/STRD TFRM EPI L/S 1: CPT | Mod: 50,,, | Performed by: ANESTHESIOLOGY

## 2023-04-26 PROCEDURE — 1101F PT FALLS ASSESS-DOCD LE1/YR: CPT | Mod: CPTII,S$GLB,, | Performed by: NURSE PRACTITIONER

## 2023-04-26 PROCEDURE — 99215 OFFICE O/P EST HI 40 MIN: CPT | Mod: 25,S$GLB,, | Performed by: NURSE PRACTITIONER

## 2023-04-26 PROCEDURE — 25500020 PHARM REV CODE 255: Performed by: ANESTHESIOLOGY

## 2023-04-26 PROCEDURE — 64483 NJX AA&/STRD TFRM EPI L/S 1: CPT | Mod: 50 | Performed by: ANESTHESIOLOGY

## 2023-04-26 PROCEDURE — 3288F FALL RISK ASSESSMENT DOCD: CPT | Mod: CPTII,S$GLB,, | Performed by: NURSE PRACTITIONER

## 2023-04-26 PROCEDURE — 63600175 PHARM REV CODE 636 W HCPCS: Performed by: ANESTHESIOLOGY

## 2023-04-26 PROCEDURE — 99999 PR PBB SHADOW E&M-EST. PATIENT-LVL V: ICD-10-PCS | Mod: PBBFAC,,, | Performed by: NURSE PRACTITIONER

## 2023-04-26 PROCEDURE — 1126F PR PAIN SEVERITY QUANTIFIED, NO PAIN PRESENT: ICD-10-PCS | Mod: CPTII,S$GLB,, | Performed by: NURSE PRACTITIONER

## 2023-04-26 PROCEDURE — 3079F PR MOST RECENT DIASTOLIC BLOOD PRESSURE 80-89 MM HG: ICD-10-PCS | Mod: CPTII,S$GLB,, | Performed by: NURSE PRACTITIONER

## 2023-04-26 PROCEDURE — 94618 PULMONARY STRESS TESTING: CPT | Mod: S$GLB,,, | Performed by: INTERNAL MEDICINE

## 2023-04-26 PROCEDURE — 1126F AMNT PAIN NOTED NONE PRSNT: CPT | Mod: CPTII,S$GLB,, | Performed by: NURSE PRACTITIONER

## 2023-04-26 PROCEDURE — 71250 CT THORAX DX C-: CPT | Mod: TC

## 2023-04-26 PROCEDURE — 99999 PR PBB SHADOW E&M-EST. PATIENT-LVL I: CPT | Mod: PBBFAC,,,

## 2023-04-26 PROCEDURE — 99999 PR PBB SHADOW E&M-EST. PATIENT-LVL I: ICD-10-PCS | Mod: PBBFAC,,,

## 2023-04-26 PROCEDURE — 3079F DIAST BP 80-89 MM HG: CPT | Mod: CPTII,S$GLB,, | Performed by: NURSE PRACTITIONER

## 2023-04-26 PROCEDURE — 99999 PR PBB SHADOW E&M-EST. PATIENT-LVL V: CPT | Mod: PBBFAC,,, | Performed by: NURSE PRACTITIONER

## 2023-04-26 PROCEDURE — 25000003 PHARM REV CODE 250: Performed by: ANESTHESIOLOGY

## 2023-04-26 PROCEDURE — 94618 PULMONARY STRESS TESTING: ICD-10-PCS | Mod: S$GLB,,, | Performed by: INTERNAL MEDICINE

## 2023-04-26 PROCEDURE — 3077F PR MOST RECENT SYSTOLIC BLOOD PRESSURE >= 140 MM HG: ICD-10-PCS | Mod: CPTII,S$GLB,, | Performed by: NURSE PRACTITIONER

## 2023-04-26 PROCEDURE — 99417 PROLNG OP E/M EACH 15 MIN: CPT | Mod: S$GLB,,, | Performed by: NURSE PRACTITIONER

## 2023-04-26 PROCEDURE — 64483 PR EPIDURAL INJ, ANES/STEROID, TRANSFORAMINAL, LUMB/SACR, SNGL LEVL: ICD-10-PCS | Mod: 50,,, | Performed by: ANESTHESIOLOGY

## 2023-04-26 PROCEDURE — 99417 PR PROLONGED SVC, OUTPT, W/WO DIRECT PT CONTACT,  EA ADDTL 15 MIN: ICD-10-PCS | Mod: S$GLB,,, | Performed by: NURSE PRACTITIONER

## 2023-04-26 RX ORDER — FENTANYL CITRATE 50 UG/ML
INJECTION, SOLUTION INTRAMUSCULAR; INTRAVENOUS
Status: DISCONTINUED | OUTPATIENT
Start: 2023-04-26 | End: 2023-04-26 | Stop reason: HOSPADM

## 2023-04-26 RX ORDER — DEXAMETHASONE SODIUM PHOSPHATE 10 MG/ML
INJECTION INTRAMUSCULAR; INTRAVENOUS
Status: DISCONTINUED | OUTPATIENT
Start: 2023-04-26 | End: 2023-04-26 | Stop reason: HOSPADM

## 2023-04-26 RX ORDER — INDOMETHACIN 25 MG/1
CAPSULE ORAL
Status: DISCONTINUED | OUTPATIENT
Start: 2023-04-26 | End: 2023-04-26 | Stop reason: HOSPADM

## 2023-04-26 RX ORDER — BUPIVACAINE HYDROCHLORIDE 2.5 MG/ML
INJECTION, SOLUTION EPIDURAL; INFILTRATION; INTRACAUDAL
Status: DISCONTINUED | OUTPATIENT
Start: 2023-04-26 | End: 2023-04-26 | Stop reason: HOSPADM

## 2023-04-26 NOTE — ASSESSMENT & PLAN NOTE
The current medical regimen is effective;  continue present plan and medications. atrovent nasal, flonase. singulair.

## 2023-04-26 NOTE — ASSESSMENT & PLAN NOTE
Stable   4/26/2023 CT chest mild amount of honeycombing in the peripheral portion of both lungs.   8/20/2020 CTA Stable pattern peripheral septal thickening most notable involving the bibasilar distribution with mild bibasilar honeycombing.  9/5/2019 CT chest There are chronic changes of interstitial lung disease with mild lower lobe bronchiectasis.  Early honeycombing and subpleural changes seen in the lung bases bilaterally.  Fine increased reticulonodular interstitial markings are seen juxtapleural regions as well.  6/4/2019 CT chest Chronic interstitial changes with juxtapleural reticulations with mild lower lobe bronchiectasis and suggestion of mild honeycombing at the lung bases.  Continued treatment COPD/mucopurulent chronic bronchitis  Trelegy 200 mcg, daliresp, Albuterol inhaler.

## 2023-04-26 NOTE — OP NOTE
Shireen Felix  77 y.o. female      Vitals:    04/26/23 1158   BP: (!) 194/83   Pulse: 62   Resp: 14   Temp:      Procedure Date: 04/26/2023        INFORMED CONSENT: The procedure, risks, benefits and options were discussed with patient. There are no contraindications to the procedure. The patient expressed understanding and agreed to proceed. The personnel performing the procedure was discussed. I verify that I personally obtained consent prior to the start of the procedure and the signed consent can be found on the patient's chart.       Anesthesia:   Conscious sedation provided by M.D    The patient was monitored with continuous pulse oximetry, EKG, and intermittent blood pressure monitors.  The patient was hemodynamically stable throughout the entire process was responsive to voice, and breathing spontaneously.  Supplemental O2 was provided at 2L/min via nasal cannula.  Patient was comfortable for the duration of the procedure. (See nurse documentation and case log for sedation time)    There was a total of 0mg IV Midazolam and 100mcg Fentanyl titrated for the procedure    Pre Procedure diagnosis: Lumbar radiculopathy, chronic [M54.16]  Post-Procedure diagnosis: SAME     Complications: None    Specimens: None      DESCRIPTION OF PROCEDURE: The patient was brought to the procedure room. IV access was obtained prior to the procedure. The patient was positioned prone on the fluoroscopy table. Continuous hemodynamic monitoring was initiated including blood pressure, EKG, and pulse oximetry. . The skin was prepped with chlorhexidine and draped in a sterile fashion. Skin anesthesia was achieved using a total of 10mL of lidocaine, 5mL over each respective injection site.     The  L4/5 transforaminal spaces were identified with fluoroscopy in the  AP, oblique, and lateral views.  A 22 gauge spinal quinke needle was then advanced into the area of the trans foraminal spaces bilateral with confirmation of proper  needle position using AP, oblique, and lateral fluoroscopic views. Once the needle tip was in the area of the transforaminal space, and there was no blood, CSF or paraesthesias,  1.5 mL of Omnipaque 300mg/ml was injected on bilateral for a total of 3mL.  Fluoroscopic imaging in the AP and lateral views revealed a clear outline of the spinal nerve with proximal spread of agent through the neural foramen into the epidural space. A total combination of 2 mL of Bupivicaine 0.25% and 10 mg dexamethasone was injected on each side for a total of 6 mL of injected medications with displacement of the contrast dye confirming that the medication went into the area of the transforaminal spaces bilateral. A sterile dressing was applied.   Patient tolerated the procedure well.    Patient was taken back to the recovery room for further observation.     The patient was discharged to home in stable condition

## 2023-04-26 NOTE — PROGRESS NOTES
Subjective:      Established patient    Patient ID: Shireen Felix is a 77 y.o. female.      Chief Complaint: Bronchitis (Review testing) and Asthma      HPI     Shireen Felix is a 77 y.o. female presents for follow up on asthma, SOB, Chronic UIP, chronic bronchitis, pulmonary nodules with review CT chest and 6MWD.  She also has been referred by neurology department for sleep apnea evaluation.     She has complaint fall yesterday with right rib pain. 4/26/2023 CT chest healing 7th and 8th rib fx, no pneumothorax.     She states her neurologist wanted her to have sleep study, Susan Contreras NP ordered sleep study on 4/12/2023 awaiting scheduling with sleep lab.     4/26/2023 CT Chest There are noncalcified pulmonary nodules in both lungs.  One of the larger ones measures 5 mm and is located in the right lower lobe.  On the prior examination it measured 6 mm.  There is a mild amount of honeycombing in the peripheral portion of both lungs.  There is no pneumothorax or pleural effusion.  10/26/2022 CPFT Normal spirometry. Normal lung volumes. Moderate reduction in diffusion capacity.     She is followed in pulmonary for chronic UIP, moderate persistent asthma with chronic bronchitis.     Patients reports stable NYHA II dyspnea     + covid 19 12/31/2021, 2/24/2022, July 2022.     The patient does not have currently have symptoms.     She is remains improved since on Trelegy 200 mcg      Her current respiratory therapy regimen is TRELEGY 200 mcg. VENTOLIN inhaler, albuterol nebs, Daliresp 500 mcg.  She is  adherent with her regimen.      42 pack year former smoker. Quit 2004.         Asthma Control Test  In the past 4  weeks, how much of the time did your asthma keep you from getting as much done at work, school or at home?: None of the time  During the past 4 weeks, how often have you had shortness of breath?: 3 to 6 times a week  During the past 4 weeks, how often did your asthma symptoms (wheezing,  couging, shortness of breath, chest tightness or pain) wake you up at night or earlier than usual in the morning?: Once or twice  During the past 4 weeks, how often have you used your rescue inhaler or nebulizer medication (such as albuterol)?: 2 or 3 times a week  How would you rate your asthma control during the past 4 weeks?: Well controlled  If your score is 19 or less, your asthma may not be under control: 19      Immunization status reviewed and up to date.      Patient has been referred by Susan Contreras NP, Neurology for sleep apnea evaluation.  Sleep Apnea evaluation  Patient has been observed snoring with frequent awakening. History of seizures, stroke, cerebral aneurysm with repair.   Patient reports non restful' sleep.  She denies morning headache.   She reports day time napping.  Day time napping duration 15 Minutes  She denies recent weight gain.  Cardiovascular risk factors: hypertension  Bed time is 1000, 1200  Wake time is 0500  Sleep onset is within  1 Hour.  Sleep maintenance difficulties related to early morning awakening, frequent night time awakening, difficulty falling asleep, and non-restful sleep  Wake after sleep onset occurs five times a night.  Nocturia occurs one time a night,   Sleep aids : Yes, melatonin  Dry mouth : No  Sleep walking: No  Sleep talking : No  Sleep eating:No  Vivid Dreams : No  Cataplexy : No    Trout Sleepiness Scale   EPWORTH SLEEPINESS SCALE 4/26/2023   Sitting and reading 1   Watching TV 3   Sitting, inactive in a public place (e.g. a theatre or a meeting) 0   As a passenger in a car for an hour without a break 0   Lying down to rest in the afternoon when circumstances permit 2   Sitting and talking to someone 0   Sitting quietly after a lunch without alcohol 1   In a car, while stopped for a few minutes in traffic 0   Total score 7       Neck circumference is 13.  Mallampati score 2    STOP - BANG Questionnaire:   1. Snoring : Do you snore loudly ?     YES  2.  Tired : Do you often feel tired, fatigued, or sleepy during daytime?   YES  3. Observed: Has anyone observed you stop breathing during your sleep?    NO  4. Blood pressure : Do you have or are you being treated for high blood pressure?    YES  5. BMI :BMI more than 35 kg/m2?   NO  6. Age : Age over 50 yr old?    YES  7. Neck circumference: Neck circumference greater than 40 cm?   NO  8. Gender: Gender male?   NO    SCORE: 4    High risk of MEENA: Yes 5 - 8  Intermediate risk of MEENA: Yes 3 - 4  Low risk of MEENA: Yes 0 - 2       A full  review of systems, past , family  and social histories was performed except as mentioned in the note above, these are non contributory to the main issues discussed today.     Previous Report Reviewed: lab reports, office notes and radiology reports     The following portions of the patient's history were reviewed and updated as appropriate: She  has a past medical history of Abnormal ECG (8/5/2019), Aneurysm, Anticoagulant long-term use, Arthritis, CAD in native artery (8/5/2019), COPD (chronic obstructive pulmonary disease), Diabetes mellitus, Fall (10/10/2019), Glaucoma, Hypertension, Seizures, Shingles (05/27/2017), and Stroke.  She  has a past surgical history that includes Brain surgery; Hysterectomy; Transforaminal epidural injection of steroid (Bilateral, 7/23/2019); Cardiac catheterization; Coronary angioplasty; Epidural steroid injection into lumbar spine (Bilateral, 11/12/2019); Injection of anesthetic agent around medial branch nerves innervating lumbar facet joint (Bilateral, 1/31/2020); Transforaminal epidural injection of steroid (Bilateral, 3/10/2020); Transforaminal epidural injection of steroid (Right, 6/19/2020); Injection of joint (Bilateral, 7/9/2020); and Injection of anesthetic agent into sacroiliac joint (Right, 11/16/2021).  Her family history includes No Known Problems in her father and mother.  She  reports that she quit smoking about 19 years ago. Her smoking  use included cigarettes. She started smoking about 61 years ago. She has a 42.00 pack-year smoking history. She has never used smokeless tobacco. She reports that she does not drink alcohol and does not use drugs.  She has a current medication list which includes the following prescription(s): albuterol, amlodipine, aptiom, atorvastatin, cholecalciferol (vitamin d3), clopidogrel, daliresp, denosumab, diclofenac, diltiazem, dorzolamide-timolol 2-0.5%, escitalopram oxalate, eslicarbazepine, fluticasone propionate, trelegy ellipta, gabapentin, hydrochlorothiazide, ipratropium, ketoconazole, latanoprost, levocetirizine, magnesium oxide, metformin, metoprolol tartrate, montelukast, myrbetriq, nitroglycerin, omeprazole, pantoprazole, potassium chloride sa, promethazine-dextromethorphan, ranolazine, triamcinolone acetonide 0.025%, valsartan, methylprednisolone, nortriptyline, prednisone, and tizanidine.  She is allergic to penicillins, adhesive, betadine [povidone-iodine], doxycycline, oxycodone, sulfa (sulfonamide antibiotics), and zanaflex [tizanidine]..    Review of Systems   Constitutional:  Positive for fatigue. Negative for fever and chills.   HENT:  Positive for hearing loss. Negative for nosebleeds, rhinorrhea and congestion.    Eyes:  Negative for redness.   Respiratory:  Positive for snoring and use of rescue inhaler. Negative for cough, sputum production, choking, wheezing and dyspnea on extertion.    Genitourinary:  Negative for hematuria.   Endocrine: Diabetes melllitus Negative for cold intolerance.    Musculoskeletal:  Positive for arthralgias and back pain.   Skin:  Negative for rash.   Gastrointestinal:  Positive for acid reflux. Negative for vomiting.   Neurological:  Negative for syncope and headaches.        History of stroke  Seizure disorder.  Post herpetic neuralgia   Hematological:  Negative for adenopathy. Bleeds easily and excessive bruising.   Psychiatric/Behavioral:  Negative for confusion.    "    Objective:     BP (!) 173/81   Pulse 61   Resp 18   Ht 5' 4" (1.626 m)   Wt 56.4 kg (124 lb 5.4 oz)   LMP  (LMP Unknown)   SpO2 100%   BMI 21.34 kg/m²   Body mass index is 21.34 kg/m².     Physical Exam  Vitals and nursing note reviewed.   Constitutional:       General: She is not in acute distress.     Appearance: Normal appearance. She is well-developed. She is not ill-appearing or toxic-appearing.   HENT:      Head: Normocephalic and atraumatic.      Right Ear: Tympanic membrane and external ear normal.      Left Ear: Tympanic membrane and external ear normal.      Nose: Nose normal.      Mouth/Throat:      Pharynx: No oropharyngeal exudate.   Eyes:      Conjunctiva/sclera: Conjunctivae normal.      Pupils: Pupils are equal, round, and reactive to light.   Cardiovascular:      Rate and Rhythm: Normal rate and regular rhythm.      Heart sounds: Normal heart sounds. No murmur heard.  Pulmonary:      Effort: Pulmonary effort is normal. No respiratory distress.      Breath sounds: Normal breath sounds. No stridor.   Abdominal:      General: Bowel sounds are normal.      Palpations: Abdomen is soft.   Musculoskeletal:         General: No tenderness. Normal range of motion.      Cervical back: Normal range of motion and neck supple.   Lymphadenopathy:      Cervical: No cervical adenopathy.   Skin:     General: Skin is warm and dry.      Coloration: Skin is not pale.   Neurological:      Mental Status: She is alert and oriented to person, place, and time.   Psychiatric:         Behavior: Behavior normal. Behavior is cooperative.         Thought Content: Thought content normal.         Judgment: Judgment normal.       Personal Diagnostic Review    4/26/2023 6MWD No desaturations requiring supplemental oxygen at rest or exertion.  Exercise capacity is less than predicted  Phase Oxygen Assessment Supplemental O2 Heart   Rate Blood Pressure Paulino Dyspnea Scale Rating   Resting 100 % Room Air 61 bpm 173/81 1 "   Exercise             Minute             1 100 % Room Air 67 bpm       2 100 % Room Air 70 bpm       3 100 % Room Air 69 bpm       4 100 % Room Air 69 bpm       5 100 % Room Air 69 bpm       6  100 % Room Air 69 bpm 186/76 3   Recovery             Minute             1 100 % Room Air 65 bpm       2 100 % Room Air 64 bpm       3 100 % Room Air 62 bpm       4 100 % Room Air 63 bpm 176/76 3      Six Minute Walk Summary  6MWT Status: completed without stopping  Patient Reported: Dyspnea, Leg pain (hip pain)             Interpretation:  Did the patient stop or pause?: No  Total Time Walked (Calculated): 360 seconds  Final Partial Lap Distance (feet): 50 feet  Total Distance Meters (Calculated): 198.12 meters  Predicted Distance Meters (Calculated): 430.2 meters  Percentage of Predicted (Calculated): 46.05  Peak VO2 (Calculated): 9.92  Mets: 2.83  Has The Patient Had a Previous Six Minute Walk Test?: No     Previous 6MWT Results  Has The Patient Had a Previous Six Minute Walk Test?: No      10/26/2022 CPFT Normal spirometry. Normal lung volumes. Moderate reduction in diffusion capacity - unadjusted for hemoglobin. (DLCO > or equal to 40% and < 60% predicted). This interpretation of diffusing capacity does not take into account the patient's hemoglobin level (Unavailable at the time of testing - hemoglobin assumed to be normal). Flow volume loops demonstrate a restrictive defect.     [facility]  See OP Notes for results.     IMPRESSION: See OP Notes for results.     This procedure was auto-finalized by: Virtual Radiologist  CT Chest Without Contrast  Narrative: EXAMINATION:  CT CHEST WITHOUT CONTRAST    CLINICAL HISTORY:  Interstitial lung disease;ILD, 6 mm pul nodule;    TECHNIQUE:  Standard chest CT protocol was performed without IV contrast.    COMPARISON:  Comparison was made to CT examination of the chest performed on 08/20/2020.    FINDINGS:  The size of heart is within normal limits.  There is a  moderate amount of atherosclerosis in the left anterior descending artery.  There is a mild amount of atherosclerosis in the left main coronary and left circumflex arteries.  There are noncalcified pulmonary nodules in both lungs.  One of the larger ones measures 5 mm and is located in the right lower lobe.  On the prior examination it measured 6 mm.  There is a mild amount of honeycombing in the peripheral portion of both lungs.  There is no pneumothorax or pleural effusion.  There are hypodense masses associated with both kidneys.  One of the larger ones measures 29 mm and is located off of the posterolateral aspect of the superior pole of the left kidney.  This mass has a Hounsfield measurement of 7.  There are healing fractures in the anterior aspect of the right 7th and 8th ribs.  There are findings characteristic of bulging and/or protrusion of the intervertebral discs scattered throughout the thoracic spine.  Impression: 1. There are noncalcified pulmonary nodules in both lungs.  One of the larger ones measures 5 mm and is located in the right lower lobe. On the prior examination it measured 6 mm.  2. There are healing fractures in the anterior aspect of the right 7th and 8th ribs.  3. There are findings characteristic of bulging and/or protrusion of the intervertebral discs scattered throughout the thoracic spine.  If additional imaging evaluation is clinically indicated, I recommend consideration of an MRI examination of the thoracic spine without IV contrast.  4. There is a mild amount of honeycombing in the peripheral portion of both lungs.  5. There is a moderate amount of atherosclerosis in the left anterior descending artery.  There is a mild amount of atherosclerosis in the left main coronary and left circumflex arteries.  6. There are hypodense masses associated with both kidneys. One of the larger ones measures 29 mm and is located off of the posterolateral aspect of the superior pole of the left  kidney.  This mass has a Hounsfield measurement of 7.  This is characteristic of a cyst.  All CT scans at this facility use dose modulation, iterative reconstruction, and/or weight base dosing when appropriate to reduce radiation dose when appropriate to reduce radiation dose to as low as reasonably achievable.    Electronically signed by: Gilmer Vidal MD  Date:    2023  Time:    08:59      Pulmonary function tests:20 :  FEV1: 2.13 (103.5 % predicted), FVC:  2.44 (91.0 % predicted), FEV1/FVC:  87.75, T.47 ( 90.1 % predicted) RV /TLC 45 ( 102% predicted, DLCO: 14.51 (70.2 % predicted)  Normal spirometry. Lung volumes not done. Diffusion capacity is mildly reduced but corrects for alveolar volume.       Assessment / Plan:       Discussed diagnosis, its evaluation, treatment and usual course. All questions answered.  Requested Prescriptions      No prescriptions requested or ordered in this encounter       Problem List Items Addressed This Visit       Mucopurulent chronic bronchitis (Chronic)    Current Assessment & Plan     MCB ROS: taking medications as instructed, no medication side effects noted.   New concerns: NONE.   Exam:  rales noted BILATERALLY.   Assessment:  MCB stable.   Plan: VENTOLIN .DUONEB, TRELEGY 200, Daliresp.            Temporal lobe epilepsy    Current Assessment & Plan     Continued management with neurology           Pulmonary interstitial fibrosis    Overview     Stable   2023 CT chest mild amount of honeycombing in the peripheral portion of both lungs.   2020 CTA Stable pattern peripheral septal thickening most notable involving the bibasilar distribution with mild bibasilar honeycombing.  2019 CT chest There are chronic changes of interstitial lung disease with mild lower lobe bronchiectasis.  Early honeycombing and subpleural changes seen in the lung bases bilaterally.  Fine increased reticulonodular interstitial markings are seen juxtapleural regions as  well.  6/4/2019 CT chest Chronic interstitial changes with juxtapleural reticulations with mild lower lobe bronchiectasis and suggestion of mild honeycombing at the lung bases.  Continued treatment COPD/mucopurulent chronic bronchitis Trelegy 200 mcg, daliresp, Albuterol inhaler.          Current Assessment & Plan     Stable   4/26/2023 CT chest mild amount of honeycombing in the peripheral portion of both lungs.   8/20/2020 CTA Stable pattern peripheral septal thickening most notable involving the bibasilar distribution with mild bibasilar honeycombing.  9/5/2019 CT chest There are chronic changes of interstitial lung disease with mild lower lobe bronchiectasis.  Early honeycombing and subpleural changes seen in the lung bases bilaterally.  Fine increased reticulonodular interstitial markings are seen juxtapleural regions as well.  6/4/2019 CT chest Chronic interstitial changes with juxtapleural reticulations with mild lower lobe bronchiectasis and suggestion of mild honeycombing at the lung bases.  Continued treatment COPD/mucopurulent chronic bronchitis  Trelegy 200 mcg, daliresp, Albuterol inhaler.              Primary insomnia    Overview     Formatting of this note might be different from the original.  Continue Restoril 15mg Q HS           Current Assessment & Plan     Managed by primary care provider  restoril 15 mg nightly if needed  Controlled on melatonin            Multiple pulmonary nodules    Overview     4/24/2023 CT chest compared to prior 08/20/2020 CTA There are noncalcified pulmonary nodules in both lungs.  One of the larger ones measures 5 mm and is located in the right lower lobe. On the prior examination it measured 6 mm.  Low risk, quit smoking 19 years ago, per Fleischner guidelines no additional follow up.              Current Assessment & Plan     4/24/2023 CT chest compared to prior 08/20/2020 CTA There are noncalcified pulmonary nodules in both lungs.  One of the larger ones measures 5 mm  and is located in the right lower lobe. On the prior examination it measured 6 mm.  Low risk, quit smoking 19 years ago, per Fleischner guidelines no additional follow up.                      SOLID PULMONARY NODULES                      SUBSOLID PULMONARY NODULES                 Moderate persistent asthma without complication - Primary    Overview     Trelegy 200 mcg triple therapy. Continue Daliresp, Albuterol inhaler, albuterol nebs. .           Current Assessment & Plan     The current medical regimen is effective;  continue present plan and medications. Trelegy 200 mcg triple therapy. Daliresp, Albuterol inhaler, albuterol nebs.            Hx of craniotomy    Relevant Orders    Polysomnogram (CPAP will be added if patient meets diagnostic criteria.)    History of stroke    Relevant Orders    Polysomnogram (CPAP will be added if patient meets diagnostic criteria.)    History of cerebral aneurysm repair    Relevant Orders    Polysomnogram (CPAP will be added if patient meets diagnostic criteria.)    GERD (gastroesophageal reflux disease)    Current Assessment & Plan     Stable and controlled. Continue current treatment plan as previously prescribed with your PCP, Protonix 40 mg             Chronic allergic rhinitis    Current Assessment & Plan     The current medical regimen is effective;  continue present plan and medications. atrovent nasal, flonase. singulair.           Bilateral renal cysts    Overview     Seen on CT scan imaging since 2019 Bilateral renal cysts are noted.              Current Assessment & Plan     Seen on CT scan imaging since 2019 Bilateral renal cysts are noted. Follow up with nephrology if needed at direction of primary care provider             Other Visit Diagnoses       Sleep-disordered breathing        intermediate risk ryan proceed with PSG     Relevant Orders    Polysomnogram (CPAP will be added if patient meets diagnostic criteria.)    Feeling tired        Relevant Orders     Polysomnogram (CPAP will be added if patient meets diagnostic criteria.)    Sleep apnea, unspecified type        Daytime sleepiness        Relevant Orders    Polysomnogram (CPAP will be added if patient meets diagnostic criteria.)    Snoring        Relevant Orders    Polysomnogram (CPAP will be added if patient meets diagnostic criteria.)          Continue Trelegy 200 mcg triple therapy. Continue Daliresp, Albuterol inhaler, add albuterol nebs twice daily.    I spent a total of 86 minutes on the day of the visit.  This includes face to face time and non-face to face time preparing to see the patient (eg, review of tests), obtaining and/or reviewing separately obtained history, documenting clinical information in the electronic or other health record, independently interpreting results and communicating results to the patient/family/caregiver, or care coordinator.    Follow up for Sleep Study results review.

## 2023-04-26 NOTE — ASSESSMENT & PLAN NOTE
Seen on CT scan imaging since 2019 Bilateral renal cysts are noted. Follow up with nephrology if needed at direction of primary care provider

## 2023-04-26 NOTE — PROCEDURES
"[facility] - Pulmonary Function  Six Minute Walk     SUMMARY     Ordering Provider: MC Fishman   Interpreting Provider: Dr. Villagomez  Performing nurse/tech/RT: ANGELY Albright RRT  Diagnosis: Shortness of Breath  Height: 5' 4" (162.6 cm)  Weight: 56.4 kg (124 lb 3.7 oz)  BMI (Calculated): 21.3   Patient Race:             Phase Oxygen Assessment Supplemental O2 Heart   Rate Blood Pressure Paulino Dyspnea Scale Rating   Resting 100 % Room Air 61 bpm 173/81 1   Exercise        Minute        1 100 % Room Air 67 bpm     2 100 % Room Air 70 bpm     3 100 % Room Air 69 bpm     4 100 % Room Air 69 bpm     5 100 % Room Air 69 bpm     6  100 % Room Air 69 bpm 186/76 3   Recovery        Minute        1 100 % Room Air 65 bpm     2 100 % Room Air 64 bpm     3 100 % Room Air 62 bpm     4 100 % Room Air 63 bpm 176/76 3     Six Minute Walk Summary  6MWT Status: completed without stopping  Patient Reported: Dyspnea, Leg pain (hip pain)     Interpretation:  Did the patient stop or pause?: No               Total Time Walked (Calculated): 360 seconds  Final Partial Lap Distance (feet): 50 feet  Total Distance Meters (Calculated): 198.12 meters  Predicted Distance Meters (Calculated): 430.2 meters  Percentage of Predicted (Calculated): 46.05  Peak VO2 (Calculated): 9.92  Mets: 2.83  Has The Patient Had a Previous Six Minute Walk Test?: No       Previous 6MWT Results  Has The Patient Had a Previous Six Minute Walk Test?: No      Six minute walk distance is 198.12m /430.2 meters (46.05 % predicted) with light.Patient did complete the study, walking 360 seconds of the 360 second test . During exercise, there was no  significant desaturation while breathing room air .Lowest oxygen saturation was 100% .Maximum heart rate during exercise was 70 bpm which is 49 % of maximum predicted heart rate of 142 bpm. Blood pressure increased significantly and Heart rate remained stable Based upon age and body mass index, exercise capacity is less than " predicted.  Peak VO2 during walking was 9.92 ml/kg/min which is 25 % of predicted Peak VO2 max of 39 ml/kg/min based on a resting heart rate of 61/min. .  Patient has not had a previous study. No previous study performed.

## 2023-04-26 NOTE — ASSESSMENT & PLAN NOTE
The current medical regimen is effective;  continue present plan and medications. Trelegy 200 mcg triple therapy. Daliresp, Albuterol inhaler, albuterol nebs.

## 2023-04-26 NOTE — DISCHARGE INSTRUCTIONS

## 2023-04-26 NOTE — ASSESSMENT & PLAN NOTE
MCB ROS: taking medications as instructed, no medication side effects noted.   New concerns: NONE.   Exam:  rales noted BILATERALLY.   Assessment:  MCB stable.   Plan: VENTOLIN .DUONEB, TRELEGY 200, Daliresp.

## 2023-04-26 NOTE — ASSESSMENT & PLAN NOTE
Stable and controlled. Continue current treatment plan as previously prescribed with your PCP, Protonix 40 mg

## 2023-04-26 NOTE — H&P
HPI  Patient presenting for Procedure(s) (LRB):  Bilateral L4/5 TF XANDER RN IV Sedation (Bilateral)     Patient on Anti-coagulation No    No health changes since previous encounter    Past Medical History:   Diagnosis Date    Abnormal ECG 8/5/2019    Aneurysm     Anticoagulant long-term use     Arthritis     CAD in native artery 8/5/2019    COPD (chronic obstructive pulmonary disease)     Diabetes mellitus     Fall 10/10/2019    Formatting of this note might be different from the original. Found sitting on floor next to bed last night Mild confusion today Does not recall falling or how she ended up on floor UA today Labs yesterday unremarkable    Glaucoma     Hypertension     Seizures     Shingles 05/27/2017    Stroke      Past Surgical History:   Procedure Laterality Date    BRAIN SURGERY      CARDIAC CATHETERIZATION      CORONARY ANGIOPLASTY      EPIDURAL STEROID INJECTION INTO LUMBAR SPINE Bilateral 11/12/2019    Procedure: TF XANDER L4/5;  Surgeon: Desean Dias MD;  Location: [facility];  Service: Pain Management;  Laterality: Bilateral;    HYSTERECTOMY      INJECTION OF ANESTHETIC AGENT AROUND MEDIAL BRANCH NERVES INNERVATING LUMBAR FACET JOINT Bilateral 1/31/2020    Procedure: Bilateral L3-5 MBB;  Surgeon: Desean Dias MD;  Location: [facility];  Service: Pain Management;  Laterality: Bilateral;    INJECTION OF ANESTHETIC AGENT INTO SACROILIAC JOINT Right 11/16/2021    Procedure: Right BLOCK, SACROILIAC JOINT Right GTB with RN IV sedation;  Surgeon: Yao Fulton MD;  Location: [facility];  Service: Pain Management;  Laterality: Right;    INJECTION OF JOINT Bilateral 7/9/2020    Procedure: Bilateral shoulder GH Joint injection with local;  Surgeon: Desean Dias MD;  Location: [facility];  Service: Pain Management;  Laterality: Bilateral;    TRANSFORAMINAL EPIDURAL INJECTION OF STEROID Bilateral 7/23/2019    Procedure: Bilateral L3/4 Transforaminal Epidural Steroid Injection;  Surgeon: Desean  SHAUNA Dias MD;  Location: [facility];  Service: Pain Management;  Laterality: Bilateral;    TRANSFORAMINAL EPIDURAL INJECTION OF STEROID Bilateral 3/10/2020    Procedure: Right T12/L1 TF XANDER with local;  Surgeon: Desean Dias MD;  Location: [facility];  Service: Pain Management;  Laterality: Bilateral;    TRANSFORAMINAL EPIDURAL INJECTION OF STEROID Right 6/19/2020    Procedure: Right T12/L1 TFESI Covid day of procedure;  Surgeon: Desean Dias MD;  Location: [facility];  Service: Pain Management;  Laterality: Right;     Review of patient's allergies indicates:   Allergen Reactions    Penicillins Anaphylaxis, Hives, Shortness Of Breath and Swelling    Adhesive Rash    Betadine [povidone-iodine] Rash     Pt confirms has completed CT w/ IV contrast-iodine without reaction or need for pre-medication.    Doxycycline Nausea Only    Oxycodone Other (See Comments)     Fatigue      Sulfa (sulfonamide antibiotics) Itching    Zanaflex [tizanidine] Anxiety        No current facility-administered medications on file prior to encounter.     Current Outpatient Medications on File Prior to Encounter   Medication Sig Dispense Refill    amLODIPine (NORVASC) 10 MG tablet TAKE 1 TABLET BY MOUTH ONCE A DAY 90 tablet 3    atorvastatin (LIPITOR) 40 MG tablet TAKE 1 TABLET BY MOUTH ONCE DAILY 90 tablet 1    diltiaZEM (DILACOR XR) 120 MG CDCR Take 1 capsule (120 mg total) by mouth once daily. 30 capsule 3    metFORMIN (GLUCOPHAGE) 500 MG tablet TAKE 1 TABLET BY MOUTH ONCE DAILY 90 tablet 3    metoprolol tartrate (LOPRESSOR) 100 MG tablet Take 1 tablet (100 mg total) by mouth 2 (two) times daily. 180 tablet 1    valsartan (DIOVAN) 320 MG tablet Take 1 tablet (320 mg total) by mouth once daily. 30 tablet 3    albuterol (PROVENTIL/VENTOLIN HFA) 90 mcg/actuation inhaler INHALE TWO PUFFS INTO THE LUNGS EVERY 4 HOURS AS NEEDED 18 g 11    APTIOM 800 mg Tab TAKE 1 TABLET BY MOUTH EVERY EVENING WITH 400MG TABLET 30 tablet 5     cholecalciferol, vitamin D3, 1,250 mcg (50,000 unit) capsule Take 1 capsule (50,000 Units total) by mouth every 7 days. 12 capsule 0    clopidogreL (PLAVIX) 75 mg tablet Take 1 tablet (75 mg total) by mouth once daily. 90 tablet 3    DALIRESP 500 mcg Tab TAKE 1 TABLET BY MOUTH ONCE DAILY 90 tablet 3    denosumab (PROLIA) 60 mg/mL Syrg every 6 (six) months.      diclofenac (VOLTAREN) 25 MG TbEC TAKE 1 TABLET BY MOUTH TWICE DAILY 60 tablet 11    eslicarbazepine (APTIOM) 400 mg Tab tablet TAKE 1 TABLET BY MOUTH EVERY EVENING WITH 800 MG TABLET  Strength: 400 mg 30 tablet 5    fluticasone propionate (FLONASE) 50 mcg/actuation nasal spray USE 2 SPRAYS INTO EACH NOSTRIL ONCE A DAY AT 6AM AS DIRECTED 16 g 11    fluticasone-umeclidin-vilanter (TRELEGY ELLIPTA) 200-62.5-25 mcg inhaler INHALE 1 PUFF BY MOUTH ONCE A DAY 60 each 11    gabapentin (NEURONTIN) 100 MG capsule Take 100 mg by mouth 2 (two) times daily as needed.      hydroCHLOROthiazide (HYDRODIURIL) 25 MG tablet TAKE 1 TABLET BY MOUTH ONCE DAILY AS NEEDED 90 tablet 3    ipratropium (ATROVENT) 42 mcg (0.06 %) nasal spray USE 2 SPRAYS INTO EACH NOSTRIL FOUR TIMES DAILY 15 mL 11    ketoconazole (NIZORAL) 2 % cream Apply topically 2 (two) times daily. For dry flaky patches of ear. 30 g 1    latanoprost 0.005 % ophthalmic solution Place 1 drop into both eyes every evening. 2.5 mL 12    levocetirizine (XYZAL) 5 MG tablet Take 1 tablet (5 mg total) by mouth every evening. 30 tablet 11    magnesium oxide (MAG-OX) 400 mg (241.3 mg magnesium) tablet Take 1 tablet (400 mg total) by mouth once daily. 90 tablet 1    methylPREDNISolone (MEDROL DOSEPACK) 4 mg tablet use as directed (Patient not taking: Reported on 4/26/2023) 1 each 0    montelukast (SINGULAIR) 10 mg tablet TAKE 1 TABLET BY MOUTH ONCE A DAY 90 tablet 3    MYRBETRIQ 50 mg Tb24 TAKE 1 TABLET BY MOUTH ONCE DAILY 30 tablet 10    nitroGLYCERIN (NITROSTAT) 0.4 MG SL tablet DISSOLVE 1 TABLET UNDER TONGUE EVERY 5 MINUTES  "FOR CHEST PAIN (MAY TAKE UP TO 3 DOSES THEN SEEK MEDICAL ATTENTION) 25 tablet 0    nortriptyline (PAMELOR) 25 MG capsule Take 1 capsule (25 mg total) by mouth every evening. 90 capsule 0    omeprazole (PRILOSEC) 40 MG capsule Take 40 mg by mouth every morning.      pantoprazole (PROTONIX) 40 MG tablet Take 1 tablet (40 mg total) by mouth 2 (two) times daily. 60 tablet 11    potassium chloride SA (K-DUR,KLOR-CON) 20 MEQ tablet Take 1 tablet (20 mEq total) by mouth once daily. 90 tablet 1    predniSONE (DELTASONE) 20 MG tablet TAKE 3 TABLETS DAILY FOR 3 DAYS then 2 TABLETS DAILY FOR 3 DAYS then 1 TABLET DAILY FOR 3 DAYS then 1/2 TABLET DAILY FOR 4 DAYS (Patient not taking: Reported on 4/26/2023) 20 tablet 0    promethazine-dextromethorphan (PROMETHAZINE-DM) 6.25-15 mg/5 mL Syrp Take by mouth.      ranolazine (RANEXA) 1,000 mg Tb12 Take 1 tablet (1,000 mg total) by mouth 2 (two) times daily. 180 tablet 3    triamcinolone acetonide 0.025% (KENALOG) 0.025 % cream Apply topically 2 (two) times daily. PRN rash and itching of ear. Mild steroid cream. 30 g 0        PMHx, PSHx, Allergies, Medications reviewed in epic    ROS negative except pain complaints in HPI    OBJECTIVE:    BP (S) (!) 186/81 (BP Location: Right arm, Patient Position: Lying) Comment: Dr Oshea notified  Pulse 63   Temp 98 °F (36.7 °C) (Temporal)   Resp 16   Ht 5' 4" (1.626 m)   Wt 56.4 kg (124 lb 5.4 oz)   LMP  (LMP Unknown)   SpO2 100%   Breastfeeding No   BMI 21.34 kg/m²     PHYSICAL EXAMINATION:    GENERAL: Well appearing, in no acute distress, alert and oriented x3.  PSYCH:  Mood and affect appropriate.  SKIN: Skin color, texture, turgor normal, no rashes or lesions which will impact the procedure.  CV: RRR with palpation of the radial artery.  PULM: No evidence of respiratory difficulty, symmetric chest rise. Clear to auscultation.  NEURO: Cranial nerves grossly intact.    Plan:    Proceed with procedure as planned Procedure(s) " (LRB):  Bilateral L4/5 TF XANDER RN IV Sedation (Bilateral)    Francisco Oshea MD  04/26/2023

## 2023-04-26 NOTE — ASSESSMENT & PLAN NOTE
4/24/2023 CT chest compared to prior 08/20/2020 CTA There are noncalcified pulmonary nodules in both lungs.  One of the larger ones measures 5 mm and is located in the right lower lobe. On the prior examination it measured 6 mm.  Low risk, quit smoking 19 years ago, per Fleischner guidelines no additional follow up.                      SOLID PULMONARY NODULES                      SUBSOLID PULMONARY NODULES

## 2023-04-26 NOTE — DISCHARGE SUMMARY
Discharge Note  Short Stay      SUMMARY     Admit Date: 4/26/2023    Attending Physician: Francisco Oshea MD        Discharge Physician: Francisco Oshea MD        Discharge Date: 4/26/2023 12:01 PM    Procedure(s) (LRB):  Bilateral L4/5 TF XANDER RN IV Sedation (Bilateral)    Final Diagnosis: Lumbar radiculopathy, chronic [M54.16]    Disposition: Home or self care    Patient Instructions:   Current Discharge Medication List        CONTINUE these medications which have NOT CHANGED    Details   amLODIPine (NORVASC) 10 MG tablet TAKE 1 TABLET BY MOUTH ONCE A DAY  Qty: 90 tablet, Refills: 3    Associated Diagnoses: Essential hypertension      atorvastatin (LIPITOR) 40 MG tablet TAKE 1 TABLET BY MOUTH ONCE DAILY  Qty: 90 tablet, Refills: 1    Associated Diagnoses: Coronary artery disease of native artery of native heart with stable angina pectoris      diltiaZEM (DILACOR XR) 120 MG CDCR Take 1 capsule (120 mg total) by mouth once daily.  Qty: 30 capsule, Refills: 3      metFORMIN (GLUCOPHAGE) 500 MG tablet TAKE 1 TABLET BY MOUTH ONCE DAILY  Qty: 90 tablet, Refills: 3      metoprolol tartrate (LOPRESSOR) 100 MG tablet Take 1 tablet (100 mg total) by mouth 2 (two) times daily.  Qty: 180 tablet, Refills: 1    Comments: .  Associated Diagnoses: Essential hypertension      valsartan (DIOVAN) 320 MG tablet Take 1 tablet (320 mg total) by mouth once daily.  Qty: 30 tablet, Refills: 3    Associated Diagnoses: Essential hypertension      albuterol (PROVENTIL/VENTOLIN HFA) 90 mcg/actuation inhaler INHALE TWO PUFFS INTO THE LUNGS EVERY 4 HOURS AS NEEDED  Qty: 18 g, Refills: 11    Comments: This prescription was filled on 11/2/2022. Any refills authorized will be placed on file.  Associated Diagnoses: Mucopurulent chronic bronchitis      !! APTIOM 800 mg Tab TAKE 1 TABLET BY MOUTH EVERY EVENING WITH 400MG TABLET  Qty: 30 tablet, Refills: 5    Associated Diagnoses: Temporal lobe seizure      cholecalciferol, vitamin D3, 1,250 mcg (50,000  unit) capsule Take 1 capsule (50,000 Units total) by mouth every 7 days.  Qty: 12 capsule, Refills: 0    Associated Diagnoses: Vitamin D insufficiency      clopidogreL (PLAVIX) 75 mg tablet Take 1 tablet (75 mg total) by mouth once daily.  Qty: 90 tablet, Refills: 3    Associated Diagnoses: Coronary artery disease of native artery of native heart with stable angina pectoris      DALIRESP 500 mcg Tab TAKE 1 TABLET BY MOUTH ONCE DAILY  Qty: 90 tablet, Refills: 3      denosumab (PROLIA) 60 mg/mL Syrg every 6 (six) months.      diclofenac (VOLTAREN) 25 MG TbEC TAKE 1 TABLET BY MOUTH TWICE DAILY  Qty: 60 tablet, Refills: 11      dorzolamide-timolol 2-0.5% (COSOPT) 22.3-6.8 mg/mL ophthalmic solution Place 1 drop into both eyes every 12 (twelve) hours.  Qty: 10 mL, Refills: 12    Associated Diagnoses: Primary open angle glaucoma (POAG) of both eyes, moderate stage      EScitalopram oxalate (LEXAPRO) 20 MG tablet TAKE 1 TABLET BY MOUTH ONCE DAILY  Qty: 90 tablet, Refills: 1    Comments: This prescription was filled on 3/31/2023. Any refills authorized will be placed on file.  Associated Diagnoses: Moderate recurrent major depression      !! eslicarbazepine (APTIOM) 400 mg Tab tablet TAKE 1 TABLET BY MOUTH EVERY EVENING WITH 800 MG TABLET  Strength: 400 mg  Qty: 30 tablet, Refills: 5      fluticasone propionate (FLONASE) 50 mcg/actuation nasal spray USE 2 SPRAYS INTO EACH NOSTRIL ONCE A DAY AT 6AM AS DIRECTED  Qty: 16 g, Refills: 11    Associated Diagnoses: Non-seasonal allergic rhinitis due to other allergic trigger      fluticasone-umeclidin-vilanter (TRELEGY ELLIPTA) 200-62.5-25 mcg inhaler INHALE 1 PUFF BY MOUTH ONCE A DAY  Qty: 60 each, Refills: 11    Associated Diagnoses: Moderate persistent asthma without complication      gabapentin (NEURONTIN) 100 MG capsule Take 100 mg by mouth 2 (two) times daily as needed.      hydroCHLOROthiazide (HYDRODIURIL) 25 MG tablet TAKE 1 TABLET BY MOUTH ONCE DAILY AS NEEDED  Qty: 90  tablet, Refills: 3      ipratropium (ATROVENT) 42 mcg (0.06 %) nasal spray USE 2 SPRAYS INTO EACH NOSTRIL FOUR TIMES DAILY  Qty: 15 mL, Refills: 11    Associated Diagnoses: Non-seasonal allergic rhinitis due to other allergic trigger      ketoconazole (NIZORAL) 2 % cream Apply topically 2 (two) times daily. For dry flaky patches of ear.  Qty: 30 g, Refills: 1    Associated Diagnoses: Seborrheic dermatitis      latanoprost 0.005 % ophthalmic solution Place 1 drop into both eyes every evening.  Qty: 2.5 mL, Refills: 12    Associated Diagnoses: Primary open angle glaucoma (POAG) of both eyes, moderate stage      levocetirizine (XYZAL) 5 MG tablet Take 1 tablet (5 mg total) by mouth every evening.  Qty: 30 tablet, Refills: 11      magnesium oxide (MAG-OX) 400 mg (241.3 mg magnesium) tablet Take 1 tablet (400 mg total) by mouth once daily.  Qty: 90 tablet, Refills: 1      methylPREDNISolone (MEDROL DOSEPACK) 4 mg tablet use as directed  Qty: 1 each, Refills: 0    Associated Diagnoses: Cervicalgia; Lumbar radiculopathy, chronic      montelukast (SINGULAIR) 10 mg tablet TAKE 1 TABLET BY MOUTH ONCE A DAY  Qty: 90 tablet, Refills: 3    Comments: This prescription was filled on 11/2/2022. Any refills authorized will be placed on file.  Associated Diagnoses: Chronic allergic rhinitis      MYRBETRIQ 50 mg Tb24 TAKE 1 TABLET BY MOUTH ONCE DAILY  Qty: 30 tablet, Refills: 10      nitroGLYCERIN (NITROSTAT) 0.4 MG SL tablet DISSOLVE 1 TABLET UNDER TONGUE EVERY 5 MINUTES FOR CHEST PAIN (MAY TAKE UP TO 3 DOSES THEN SEEK MEDICAL ATTENTION)  Qty: 25 tablet, Refills: 0      nortriptyline (PAMELOR) 25 MG capsule Take 1 capsule (25 mg total) by mouth every evening.  Qty: 90 capsule, Refills: 0      omeprazole (PRILOSEC) 40 MG capsule Take 40 mg by mouth every morning.      pantoprazole (PROTONIX) 40 MG tablet Take 1 tablet (40 mg total) by mouth 2 (two) times daily.  Qty: 60 tablet, Refills: 11      potassium chloride SA (K-DUR,KLOR-CON)  20 MEQ tablet Take 1 tablet (20 mEq total) by mouth once daily.  Qty: 90 tablet, Refills: 1      predniSONE (DELTASONE) 20 MG tablet TAKE 3 TABLETS DAILY FOR 3 DAYS then 2 TABLETS DAILY FOR 3 DAYS then 1 TABLET DAILY FOR 3 DAYS then 1/2 TABLET DAILY FOR 4 DAYS  Qty: 20 tablet, Refills: 0    Associated Diagnoses: Moderate persistent asthma without complication; Moderate persistent asthma with (acute) exacerbation      promethazine-dextromethorphan (PROMETHAZINE-DM) 6.25-15 mg/5 mL Syrp Take by mouth.      ranolazine (RANEXA) 1,000 mg Tb12 Take 1 tablet (1,000 mg total) by mouth 2 (two) times daily.  Qty: 180 tablet, Refills: 3    Associated Diagnoses: Coronary artery disease of native artery of native heart with stable angina pectoris      tiZANidine (ZANAFLEX) 4 MG tablet TAKE 1 TABLET BY MOUTH TWICE DAILY AS NEEDED  Qty: 60 tablet, Refills: 5    Associated Diagnoses: Myofascial muscle pain      triamcinolone acetonide 0.025% (KENALOG) 0.025 % cream Apply topically 2 (two) times daily. PRN rash and itching of ear. Mild steroid cream.  Qty: 30 g, Refills: 0    Associated Diagnoses: Seborrheic dermatitis       !! - Potential duplicate medications found. Please discuss with provider.              Discharge Diagnosis: Lumbar radiculopathy, chronic [M54.16]  Condition on Discharge: Stable with no complications to procedure   Diet on Discharge: Same as before.  Activity: as per instruction sheet.  Discharge to: Home with a responsible adult.  Follow up: 2-4 weeks       Please call the office at [phone] if you experience any weakness or loss of sensation, fever > 101.5, pain uncontrolled with oral medications, persistent nausea/vomiting/or diarrhea, redness or drainage from the incisions, or any other worrisome concerns. If physician on call was not reached or could not communicate with our office for any reason please go to the nearest emergency department

## 2023-04-28 DIAGNOSIS — E55.9 VITAMIN D INSUFFICIENCY: ICD-10-CM

## 2023-05-02 DIAGNOSIS — I25.118 CORONARY ARTERY DISEASE OF NATIVE ARTERY OF NATIVE HEART WITH STABLE ANGINA PECTORIS: ICD-10-CM

## 2023-05-02 RX ORDER — ATORVASTATIN CALCIUM 40 MG/1
TABLET, FILM COATED ORAL
Qty: 90 TABLET | Refills: 1 | Status: SHIPPED | OUTPATIENT
Start: 2023-05-02 | End: 2023-07-25

## 2023-05-02 NOTE — TELEPHONE ENCOUNTER
Refill Routing Note   Medication(s) are not appropriate for processing by [facility] for the following reason(s):      Allergy or intolerance    [facility] action(s):  Defer Labs due   Medication Therapy Plan: Allergy/Contraindication: Povidone-iodine (inactive Ingredient) Reactions: Rash      Appointments  past 12m or future 3m with PCP    Date Provider   Last Visit   3/3/2023 CARLEE Chaudhari Jr., MD   Next Visit   Visit date not found CARLEE Chaudhari Jr., MD   ED visits in past 90 days: 0        Note composed:1:40 PM 05/02/2023

## 2023-05-02 NOTE — TELEPHONE ENCOUNTER
Care Due:                  Date            Visit Type   Department     Provider  --------------------------------------------------------------------------------                                EP -                              PRIMARY      [facility] INTERNAL  Last Visit: 03-      CARE ([facility])   MEDICINE       Laron Chaudhari  Next Visit: None Scheduled  None         None Found                                                            Last  Test          Frequency    Reason                     Performed    Due Date  --------------------------------------------------------------------------------    CBC.........  12 months..  clopidogreL..............  01- 01-    HBA1C.......  6 months...  metFORMIN................  10-   04-    [employer] Embedded Care Due Messages. Reference number: [id number].   5/02/2023 1:09:59 PM CDT

## 2023-05-03 RX ORDER — ASPIRIN 325 MG
50000 TABLET, DELAYED RELEASE (ENTERIC COATED) ORAL
Qty: 12 CAPSULE | Refills: 0 | Status: SHIPPED | OUTPATIENT
Start: 2023-05-03 | End: 2023-07-25

## 2023-05-04 ENCOUNTER — HOSPITAL ENCOUNTER (OUTPATIENT)
Dept: SLEEP MEDICINE | Facility: HOSPITAL | Age: 78
Discharge: HOME | End: 2023-05-04
Attending: NURSE PRACTITIONER
Payer: MEDICARE

## 2023-05-04 DIAGNOSIS — Z86.79 HISTORY OF CEREBRAL ANEURYSM REPAIR: ICD-10-CM

## 2023-05-04 DIAGNOSIS — Z98.890 HISTORY OF CEREBRAL ANEURYSM REPAIR: ICD-10-CM

## 2023-05-04 DIAGNOSIS — R53.83 FEELING TIRED: ICD-10-CM

## 2023-05-04 DIAGNOSIS — Z86.73 HISTORY OF STROKE: ICD-10-CM

## 2023-05-04 DIAGNOSIS — G47.30 SLEEP-DISORDERED BREATHING: ICD-10-CM

## 2023-05-04 DIAGNOSIS — R40.0 DAYTIME SLEEPINESS: ICD-10-CM

## 2023-05-04 DIAGNOSIS — G47.61 PLMD (PERIODIC LIMB MOVEMENT DISORDER): Primary | ICD-10-CM

## 2023-05-04 DIAGNOSIS — R06.83 SNORING: ICD-10-CM

## 2023-05-04 DIAGNOSIS — Z98.890 HX OF CRANIOTOMY: ICD-10-CM

## 2023-05-04 PROCEDURE — 95810 POLYSOM 6/> YRS 4/> PARAM: CPT

## 2023-05-04 NOTE — Clinical Note
DIAGNOSIS: PRIMARY SNORING, PLMD  RECOMMENDATIONS:  1. Clinical correlation for restless legs: check Ferritin 2. Sleep hygiene 3. Interventions for snoring may be considered: see ENT

## 2023-05-05 PROBLEM — R53.83 FEELING TIRED: Status: ACTIVE | Noted: 2023-05-05

## 2023-05-08 ENCOUNTER — TELEPHONE (OUTPATIENT)
Dept: PAIN MEDICINE | Facility: CLINIC | Age: 78
End: 2023-05-08
Payer: MEDICARE

## 2023-05-08 ENCOUNTER — TELEPHONE (OUTPATIENT)
Dept: PULMONOLOGY | Facility: CLINIC | Age: 78
End: 2023-05-08
Payer: MEDICARE

## 2023-05-08 NOTE — TELEPHONE ENCOUNTER
----- Message from Mago Irizarry sent at 5/8/2023  8:17 AM CDT -----  Pt is requesting a call back concerning the upcoming injection. Call back at [phone]  Thx jm

## 2023-05-08 NOTE — TELEPHONE ENCOUNTER
Spoke to pt she wanted Ms. Fishman to put in a order for a cxr to check on ribs since pt had a fall. Provider stated she had a CT chest that revealed. healing 7th and 8th rib fx, no pneumothorax.  Called pt to let her know what provider said and wanted to know after knowing this is she still would like the CXR. Pt did not answer and it said the voicemail is full.

## 2023-05-08 NOTE — TELEPHONE ENCOUNTER
"Returned pt call regarding procedure. Pt states that she is not going to be able to have the procedure. Pt states that she did not like the anesthetics that was used. Pt states that she felt everything. Pt states that she felt as is she had a large "jennifer horse".  Pt states that she would like to know other options for medication prior to procedure. Pt states that the medication did not agree with her at all. Informed pt that I will send this information over to Dr. Oshea.   "

## 2023-05-09 ENCOUNTER — TELEPHONE (OUTPATIENT)
Dept: PAIN MEDICINE | Facility: CLINIC | Age: 78
End: 2023-05-09
Payer: MEDICARE

## 2023-05-09 NOTE — TELEPHONE ENCOUNTER
"----- Message from Francisco Oshea MD sent at 5/9/2023  7:25 AM CDT -----  Pt needs a clinic visit to discuss alternatives, Please schedule with Alanis or Domenica  ----- Message -----  From: Mago Wilson LPN  Sent: 5/8/2023   4:26 PM CDT  To: Francisco Oshea MD    Pt states that she is not going to be able to have the procedure. Pt states that she did not like the anesthetics that was used. Pt states that she felt everything. Pt states that she felt as is she had a large "jennifer horse".  Pt states that she would like to know other options for medication prior to procedure.      "

## 2023-05-09 NOTE — TELEPHONE ENCOUNTER
Returned pt call regarding medication used during procedure. Dr. Oshea states that she would like the pt to come in clinic to discuss the medication alternatives with Alanis Peres PA-C. Procedure postponed for now. Appointment scheduled for 05/18. All questions answered.

## 2023-05-10 DIAGNOSIS — G47.61 PLMD (PERIODIC LIMB MOVEMENT DISORDER): ICD-10-CM

## 2023-05-10 DIAGNOSIS — J41.1 MUCOPURULENT CHRONIC BRONCHITIS: Chronic | ICD-10-CM

## 2023-05-10 DIAGNOSIS — F45.8 OTHER SOMATOFORM DISORDERS: Primary | ICD-10-CM

## 2023-05-10 DIAGNOSIS — J84.10 PULMONARY INTERSTITIAL FIBROSIS: ICD-10-CM

## 2023-05-10 DIAGNOSIS — J45.40 MODERATE PERSISTENT ASTHMA WITHOUT COMPLICATION: ICD-10-CM

## 2023-05-10 PROBLEM — R06.83 PRIMARY SNORING: Status: ACTIVE | Noted: 2023-05-10

## 2023-05-10 PROCEDURE — 95810 PR POLYSOMNOGRAPHY, 4 OR MORE: ICD-10-PCS | Mod: 26,,, | Performed by: INTERNAL MEDICINE

## 2023-05-10 PROCEDURE — 95810 POLYSOM 6/> YRS 4/> PARAM: CPT | Mod: 26,,, | Performed by: INTERNAL MEDICINE

## 2023-05-10 NOTE — PROGRESS NOTES
Fu results review in office  PSG and COPD/asthma  Cpft/cxr/feno/ferrtin     2023 PSG   CONCLUSION:  Overall AHI was 2.9/hr : No obstructive sleep apnea.  Snoring recorded  Delayed sleep latency decreased REM sleep.  SpO2 marko was 59%, however SpO2 was below 88% for 2.4 minutes.  High arousal index: 48.3/hr.  The Limb Movement index was 131.7 per hour while the PLM index was 131.7 per hour. PLMAI was 28.1/hr        DIAGNOSIS: PRIMARY SNORING, PLMD     RECOMMENDATIONS:     Clinical correlation for restless legs: check Ferritin  Sleep hygiene  Interventions for snoring may be considered: see ENT    Orders Placed This Encounter   Procedures    X-Ray Chest PA And Lateral     Standing Status:   Future     Standing Expiration Date:   5/10/2024     Order Specific Question:   May the Radiologist modify the order per protocol to meet the clinical needs of the patient?     Answer:   Yes     Order Specific Question:   Release to patient     Answer:   Immediate    FERRITIN     Standing Status:   Future     Standing Expiration Date:   2024    Fraction of  Nitric Oxide     Standing Status:   Future     Standing Expiration Date:   5/10/2024     Order Specific Question:   Release to patient     Answer:   Immediate    Complete PFT with bronchodilator     Standing Status:   Future     Standing Expiration Date:   5/10/2024     Order Specific Question:   Release to patient     Answer:   Immediate     1. Other somatoform disorders  FERRITIN      2. PLMD (periodic limb movement disorder)  FERRITIN      3. Mucopurulent chronic bronchitis  X-Ray Chest PA And Lateral    Complete PFT with bronchodilator      4. Pulmonary interstitial fibrosis  X-Ray Chest PA And Lateral    Complete PFT with bronchodilator      5. Moderate persistent asthma without complication  Fraction of  Nitric Oxide    X-Ray Chest PA And Lateral    Complete PFT with bronchodilator           What test is she needing??  AGD

## 2023-05-10 NOTE — PROCEDURES
"Patient Name: Shireen Felix Study Date: 5/4/2023   YOB: 1945 Study Type: PSG   Age: 77 year Patient #: [id number]   Sex: Female Billing ID: -   Height: 5' 4" Interpreting Physician: Humza Villagomez MD   Weight: 124.0 lbs Recording Tech: Tino Veras   BMI: 21.4 Scoring Tech: Susy Hassan       PATIENT: Shireen Felix  study Date: 5/4/2023  Referring Physician: Dyana Fishman NP    Indications for Polysomnography: The patient is a 77 year year old Female who is 5' 4" and weighs 124.0 lbs.  Her BMI equals 21.4.  A full night polysomnogram was performed to evaluate for -.MEENA, Neurologist requested Sleep test. observed snoring with frequent awakening. She reports non restful' sleep. Cardiovascular risk factors: hypertension  Bed time is 1000, 1200  Wake time is 0500  Sleep onset is within 1 Hour  STOPBANG score 4. [place] score 7. Legs restless at night. Sometimes walk in sleep.    She has a current medication list which includes the following prescription(s): albuterol, amlodipine, aptiom, atorvastatin, cholecalciferol (vitamin d3), clopidogrel, daliresp, denosumab, diclofenac, diltiazem, dorzolamide-timolol 2-0.5%, escitalopram oxalate, eslicarbazepine, fluticasone propionate, trelegy ellipta, gabapentin, hydrochlorothiazide, ipratropium, ketoconazole, latanoprost, levocetirizine, magnesium oxide, metformin, metoprolol tartrate, montelukast, myrbetriq, nitroglycerin, omeprazole, pantoprazole, potassium chloride sa, promethazine-dextromethorphan, ranolazine, triamcinolone acetonide 0.025%, valsartan, methylprednisolone, nortriptyline, prednisone, and tizanidine.    Polysomnogram Data:  A full night polysomnogram recorded the standard physiologic parameters including EEG, EOG, EMG, EKG, nasal and oral airflow.  Respiratory parameters of chest and abdominal movements were recorded with [place] motion transducers.  Oxygen saturation was recorded by pulse oximetry.      Sleep Architecture:  " The total recording time of the polysomnogram was 487.2 minutes.  The total sleep time was 435.0 minutes.  The patient spent 11.4% of total sleep time in Stage N1, 69.1% in Stage N2, 14.0% in Stages N3 and N, and 5.5% in REM.   Sleep latency was 30.0 minutes.  REM latency was 290.5 minutes.  Sleep Efficiency was 89.3%.  Sleep Maintenance Efficiency was 95.1%.  Total wake time was 52.5 minutes for a total wake percentage of 4.8%.    Her combined RDI was 3.2/HR. REM RDI was -/HR. She had 350 arousals and 17 awakenings for a total arousal index of 48.3/HR. Spontaneous arousal count was 130. PLMS count was 955 for an index of 131.7/HR. EKG revealednormal sinus rhythm. Sleep architecture was fragmented with an increase in stages 1 and 2. There was a decrease in stages REM and Delta.    Periodic Limb Movement Data (primarily legs unless otherwise noted)  Total number of limb movements:  955 PLM index:  131.7     PLMS with Arousal:  204    Total # of Arousals:  350   Respiratory Events:  11   Spontaneous Arousals:  130   Total Arousal Index:  48.3      Respiratory Events:  The polysomnogram revealed a presence of - obstructive, 2 central, and - mixed apneas resulting in an Apnea index of 0.3 events per hour.  There were 20 hypopneas (using 3% desaturation criteria) resulting in a Hypopnea index of 2.8 events per hour.  The combined Apnea/Hypopnea index (using 3% desaturation criteria for Hypopneas) was 2.9 events per hour.    Baseline oxygen saturation was 95.6%.  The lowest oxygen saturation was 59.0%.      LIMB ACTIVITY:  There were 955 limb movements recorded.  Of this total, 955 were classified as PLMs.  Of the PLMs, 204 were associated with arousals.  The Limb Movement index was 131.7 per hour while the PLM index was 131.7 per hour.    CARDIAC SUMMARY:   The average pulse rate was 61.0 bpm.  The minimum pulse rate was 52.0 bpm while the maximum pulse rate was 76.0 bpm.      CONCLUSION:  Overall AHI was 2.9/hr : No  "obstructive sleep apnea.  Snoring recorded  Delayed sleep latency decreased REM sleep.  SpO2 marko was 59%, however SpO2 was below 88% for 2.4 minutes.  High arousal index: 48.3/hr.  The Limb Movement index was 131.7 per hour while the PLM index was 131.7 per hour. PLMAI was 28.1/hr      DIAGNOSIS: PRIMARY SNORING, PLMD    RECOMMENDATIONS:    Clinical correlation for restless legs: check Ferritin  Sleep hygiene  Interventions for snoring may be considered: [place]  [id number] [facility]  [place] [id number]/E Brody Chaudhari Jr, [place]         The sleep study that you ordered is complete.  You have ordered sleep LAB services to perform the sleep study for Shireen Felix .      Please find Sleep Study result in  the "Media tab" of Chart Review menu.        You can look  for the report in the  Media by the document type "Sleep Study Documents". Alphabetizing  "Document type" column helps to find the SLEEP STUDY report  Faster.       As the ordering provider, you are responsible for reviewing the results and implementing a treatment plan with your patient.    If you need a Sleep Medicine provider to explain the sleep study findings and arrange treatment for the patient, please refer patient for consultation to our Sleep Clinic via [facility] with Ambulatory Consult Sleep.     To do that please place an order for an  "Ambulatory Consult Sleep" -  order , it will go to our clinic work queue for our staff  to contact the patient for an appointment.      For any questions, please contact our sleep lab  staff at [phone] to talk to clinical staff          Humza Villagomez MD     "

## 2023-05-15 DIAGNOSIS — I10 ESSENTIAL HYPERTENSION: Primary | ICD-10-CM

## 2023-05-16 ENCOUNTER — OFFICE VISIT (OUTPATIENT)
Dept: PAIN MEDICINE | Facility: CLINIC | Age: 78
End: 2023-05-16
Payer: MEDICARE

## 2023-05-16 ENCOUNTER — OFFICE VISIT (OUTPATIENT)
Dept: PULMONOLOGY | Facility: CLINIC | Age: 78
End: 2023-05-16
Payer: MEDICARE

## 2023-05-16 ENCOUNTER — CLINICAL SUPPORT (OUTPATIENT)
Dept: PULMONOLOGY | Facility: CLINIC | Age: 78
End: 2023-05-16
Attending: NURSE PRACTITIONER
Payer: MEDICARE

## 2023-05-16 ENCOUNTER — CLINICAL SUPPORT (OUTPATIENT)
Dept: PULMONOLOGY | Facility: CLINIC | Age: 78
End: 2023-05-16
Payer: MEDICARE

## 2023-05-16 ENCOUNTER — HOSPITAL ENCOUNTER (OUTPATIENT)
Dept: CARDIOLOGY | Facility: HOSPITAL | Age: 78
Discharge: HOME | End: 2023-05-16
Attending: INTERNAL MEDICINE
Payer: MEDICARE

## 2023-05-16 ENCOUNTER — HOSPITAL ENCOUNTER (OUTPATIENT)
Dept: RADIOLOGY | Facility: HOSPITAL | Age: 78
Discharge: HOME | End: 2023-05-16
Attending: NURSE PRACTITIONER
Payer: MEDICARE

## 2023-05-16 VITALS
WEIGHT: 124.31 LBS | HEART RATE: 68 BPM | RESPIRATION RATE: 16 BRPM | DIASTOLIC BLOOD PRESSURE: 80 MMHG | OXYGEN SATURATION: 99 % | SYSTOLIC BLOOD PRESSURE: 166 MMHG | HEIGHT: 64 IN | BODY MASS INDEX: 21.22 KG/M2

## 2023-05-16 VITALS — SYSTOLIC BLOOD PRESSURE: 146 MMHG | HEART RATE: 69 BPM | DIASTOLIC BLOOD PRESSURE: 73 MMHG

## 2023-05-16 DIAGNOSIS — I50.32 CHRONIC DIASTOLIC HEART FAILURE: ICD-10-CM

## 2023-05-16 DIAGNOSIS — J45.40 MODERATE PERSISTENT ASTHMA WITHOUT COMPLICATION: ICD-10-CM

## 2023-05-16 DIAGNOSIS — J84.10 PULMONARY INTERSTITIAL FIBROSIS: ICD-10-CM

## 2023-05-16 DIAGNOSIS — F51.01 PRIMARY INSOMNIA: ICD-10-CM

## 2023-05-16 DIAGNOSIS — M79.18 MYOFASCIAL MUSCLE PAIN: ICD-10-CM

## 2023-05-16 DIAGNOSIS — J41.1 MUCOPURULENT CHRONIC BRONCHITIS: ICD-10-CM

## 2023-05-16 DIAGNOSIS — J41.1 MUCOPURULENT CHRONIC BRONCHITIS: Chronic | ICD-10-CM

## 2023-05-16 DIAGNOSIS — R06.83 PRIMARY SNORING: ICD-10-CM

## 2023-05-16 DIAGNOSIS — J45.40 MODERATE PERSISTENT ASTHMA WITHOUT COMPLICATION: Primary | ICD-10-CM

## 2023-05-16 DIAGNOSIS — I10 ESSENTIAL HYPERTENSION: ICD-10-CM

## 2023-05-16 DIAGNOSIS — M54.16 LUMBAR RADICULOPATHY, CHRONIC: ICD-10-CM

## 2023-05-16 DIAGNOSIS — J45.41 MODERATE PERSISTENT ASTHMA WITH (ACUTE) EXACERBATION: ICD-10-CM

## 2023-05-16 DIAGNOSIS — M54.2 CERVICALGIA: ICD-10-CM

## 2023-05-16 LAB
BRPFT: NORMAL
DLCO ADJ PRE: 13.18 ML/(MIN*MMHG)
DLCO SINGLE BREATH LLN: 14.49
DLCO SINGLE BREATH PRE REF: 65.2 %
DLCO SINGLE BREATH REF: 20.22
DLCOC SBVA LLN: 2.66
DLCOC SBVA PRE REF: 96.2 %
DLCOC SBVA REF: 4.07
DLCOC SINGLE BREATH LLN: 14.49
DLCOC SINGLE BREATH PRE REF: 65.2 %
DLCOC SINGLE BREATH REF: 20.22
DLCOVA LLN: 2.66
DLCOVA PRE REF: 96.2 %
DLCOVA PRE: 3.91 ML/(MIN*MMHG*L)
DLCOVA REF: 4.07
DLVAADJ PRE: 3.91 ML/(MIN*MMHG*L)
ERV LLN: -16449.47
ERV PRE REF: 101.7 %
ERV REF: 0.53
FEF 25 75 CHG: 12.3 %
FEF 25 75 LLN: 0.68
FEF 25 75 POST REF: 251.1 %
FEF 25 75 PRE REF: 223.7 %
FEF 25 75 REF: 1.61
FET100 CHG: -32 %
FEV1 CHG: 4.8 %
FEV1 FVC CHG: 1.9 %
FEV1 FVC LLN: 63
FEV1 FVC POST REF: 119.6 %
FEV1 FVC PRE REF: 117.4 %
FEV1 FVC REF: 77
FEV1 LLN: 1.4
FEV1 POST REF: 110.4 %
FEV1 PRE REF: 105.3 %
FEV1 REF: 1.99
FRCPLETH LLN: 1.91
FRCPLETH PREREF: 94.7 %
FRCPLETH REF: 2.73
FVC CHG: 2.9 %
FVC LLN: 1.84
FVC POST REF: 91.2 %
FVC PRE REF: 88.6 %
FVC REF: 2.6
IVC PRE: 2.14 L
IVC SINGLE BREATH LLN: 1.84
IVC SINGLE BREATH PRE REF: 82.3 %
IVC SINGLE BREATH REF: 2.6
MVV LLN: 64
MVV PRE REF: 76.8 %
MVV REF: 76
PEF CHG: 22.8 %
PEF LLN: 3.29
PEF POST REF: 116.8 %
PEF PRE REF: 95.2 %
PEF REF: 5.01
POST FEF 25 75: 4.03 L/S
POST FET 100: 2.5 SEC
POST FEV1 FVC: 92.25 %
POST FEV1: 2.19 L
POST FVC: 2.38 L
POST PEF: 5.85 L/S
PRE DLCO: 13.18 ML/(MIN*MMHG)
PRE ERV: 0.54 L
PRE FEF 25 75: 3.59 L/S
PRE FET 100: 3.68 SEC
PRE FEV1 FVC: 90.55 %
PRE FEV1: 2.09 L
PRE FRC PL: 2.58 L
PRE FVC: 2.31 L
PRE MVV: 58 L/MIN
PRE PEF: 4.76 L/S
PRE RV: 1.95 L
PRE TLC: 4.29 L
RAW LLN: 3.06
RAW PRE REF: 75 %
RAW PRE: 2.29 CMH2O*S/L
RAW REF: 3.06
RV LLN: 1.62
RV PRE REF: 88.6 %
RV REF: 2.2
RVTLC LLN: 36
RVTLC PRE REF: 99.7 %
RVTLC PRE: 45.36 %
RVTLC REF: 45
TLC LLN: 3.98
TLC PRE REF: 86.5 %
TLC REF: 4.97
VA PRE: 3.37 L
VA SINGLE BREATH LLN: 4.82
VA SINGLE BREATH PRE REF: 69.9 %
VA SINGLE BREATH REF: 4.82
VC LLN: 1.84
VC PRE REF: 90.1 %
VC PRE: 2.35 L
VC REF: 2.6
VTGRAWPRE: 2.75 L

## 2023-05-16 PROCEDURE — 71046 X-RAY EXAM CHEST 2 VIEWS: CPT | Mod: TC

## 2023-05-16 PROCEDURE — 1101F PR PT FALLS ASSESS DOC 0-1 FALLS W/OUT INJ PAST YR: ICD-10-PCS | Mod: CPTII,,, | Performed by: PHYSICIAN ASSISTANT

## 2023-05-16 PROCEDURE — 99214 OFFICE O/P EST MOD 30 MIN: CPT | Mod: 25,,, | Performed by: NURSE PRACTITIONER

## 2023-05-16 PROCEDURE — 3288F FALL RISK ASSESSMENT DOCD: CPT | Mod: CPTII,,, | Performed by: NURSE PRACTITIONER

## 2023-05-16 PROCEDURE — 94060 PR EVAL OF BRONCHOSPASM: ICD-10-PCS | Mod: ,,, | Performed by: INTERNAL MEDICINE

## 2023-05-16 PROCEDURE — 3077F SYST BP >= 140 MM HG: CPT | Mod: CPTII,,, | Performed by: NURSE PRACTITIONER

## 2023-05-16 PROCEDURE — 3079F PR MOST RECENT DIASTOLIC BLOOD PRESSURE 80-89 MM HG: ICD-10-PCS | Mod: CPTII,,, | Performed by: NURSE PRACTITIONER

## 2023-05-16 PROCEDURE — 1159F PR MEDICATION LIST DOCUMENTED IN MEDICAL RECORD: ICD-10-PCS | Mod: CPTII,,, | Performed by: NURSE PRACTITIONER

## 2023-05-16 PROCEDURE — 1101F PT FALLS ASSESS-DOCD LE1/YR: CPT | Mod: CPTII,,, | Performed by: PHYSICIAN ASSISTANT

## 2023-05-16 PROCEDURE — 3077F PR MOST RECENT SYSTOLIC BLOOD PRESSURE >= 140 MM HG: ICD-10-PCS | Mod: CPTII,,, | Performed by: NURSE PRACTITIONER

## 2023-05-16 PROCEDURE — 94726 PLETHYSMOGRAPHY LUNG VOLUMES: CPT | Mod: ,,, | Performed by: INTERNAL MEDICINE

## 2023-05-16 PROCEDURE — 99999 PR PBB SHADOW E&M-EST. PATIENT-LVL IV: CPT | Mod: PBBFAC,,, | Performed by: PHYSICIAN ASSISTANT

## 2023-05-16 PROCEDURE — 94729 DIFFUSING CAPACITY: CPT | Mod: ,,, | Performed by: INTERNAL MEDICINE

## 2023-05-16 PROCEDURE — 71046 XR CHEST PA AND LATERAL: ICD-10-PCS | Mod: 26,,, | Performed by: RADIOLOGY

## 2023-05-16 PROCEDURE — 94729 PR C02/MEMBANE DIFFUSE CAPACITY: ICD-10-PCS | Mod: ,,, | Performed by: INTERNAL MEDICINE

## 2023-05-16 PROCEDURE — 3078F DIAST BP <80 MM HG: CPT | Mod: CPTII,,, | Performed by: PHYSICIAN ASSISTANT

## 2023-05-16 PROCEDURE — 99214 PR OFFICE/OUTPT VISIT, EST, LEVL IV, 30-39 MIN: ICD-10-PCS | Mod: ,,, | Performed by: PHYSICIAN ASSISTANT

## 2023-05-16 PROCEDURE — 3078F PR MOST RECENT DIASTOLIC BLOOD PRESSURE < 80 MM HG: ICD-10-PCS | Mod: CPTII,,, | Performed by: PHYSICIAN ASSISTANT

## 2023-05-16 PROCEDURE — 1160F PR REVIEW ALL MEDS BY PRESCRIBER/CLIN PHARMACIST DOCUMENTED: ICD-10-PCS | Mod: CPTII,,, | Performed by: PHYSICIAN ASSISTANT

## 2023-05-16 PROCEDURE — 3288F PR FALLS RISK ASSESSMENT DOCUMENTED: ICD-10-PCS | Mod: CPTII,,, | Performed by: PHYSICIAN ASSISTANT

## 2023-05-16 PROCEDURE — 1160F RVW MEDS BY RX/DR IN RCRD: CPT | Mod: CPTII,,, | Performed by: NURSE PRACTITIONER

## 2023-05-16 PROCEDURE — 94726 PULM FUNCT TST PLETHYSMOGRAP: ICD-10-PCS | Mod: ,,, | Performed by: INTERNAL MEDICINE

## 2023-05-16 PROCEDURE — 99999 PR PBB SHADOW E&M-EST. PATIENT-LVL IV: ICD-10-PCS | Mod: PBBFAC,,, | Performed by: PHYSICIAN ASSISTANT

## 2023-05-16 PROCEDURE — 1159F MED LIST DOCD IN RCRD: CPT | Mod: CPTII,,, | Performed by: PHYSICIAN ASSISTANT

## 2023-05-16 PROCEDURE — 99214 OFFICE O/P EST MOD 30 MIN: CPT | Mod: ,,, | Performed by: PHYSICIAN ASSISTANT

## 2023-05-16 PROCEDURE — 1125F AMNT PAIN NOTED PAIN PRSNT: CPT | Mod: CPTII,,, | Performed by: PHYSICIAN ASSISTANT

## 2023-05-16 PROCEDURE — 99999 PR PBB SHADOW E&M-EST. PATIENT-LVL V: CPT | Mod: PBBFAC,,, | Performed by: NURSE PRACTITIONER

## 2023-05-16 PROCEDURE — 1159F MED LIST DOCD IN RCRD: CPT | Mod: CPTII,,, | Performed by: NURSE PRACTITIONER

## 2023-05-16 PROCEDURE — 99214 PR OFFICE/OUTPT VISIT, EST, LEVL IV, 30-39 MIN: ICD-10-PCS | Mod: 25,,, | Performed by: NURSE PRACTITIONER

## 2023-05-16 PROCEDURE — 3079F DIAST BP 80-89 MM HG: CPT | Mod: CPTII,,, | Performed by: NURSE PRACTITIONER

## 2023-05-16 PROCEDURE — 71046 X-RAY EXAM CHEST 2 VIEWS: CPT | Mod: 26,,, | Performed by: RADIOLOGY

## 2023-05-16 PROCEDURE — 1125F PR PAIN SEVERITY QUANTIFIED, PAIN PRESENT: ICD-10-PCS | Mod: CPTII,,, | Performed by: PHYSICIAN ASSISTANT

## 2023-05-16 PROCEDURE — 94060 EVALUATION OF WHEEZING: CPT | Mod: ,,, | Performed by: INTERNAL MEDICINE

## 2023-05-16 PROCEDURE — 1160F PR REVIEW ALL MEDS BY PRESCRIBER/CLIN PHARMACIST DOCUMENTED: ICD-10-PCS | Mod: CPTII,,, | Performed by: NURSE PRACTITIONER

## 2023-05-16 PROCEDURE — 1100F PR PT FALLS ASSESS DOC 2+ FALLS/FALL W/INJURY/YR: ICD-10-PCS | Mod: CPTII,,, | Performed by: NURSE PRACTITIONER

## 2023-05-16 PROCEDURE — 3077F SYST BP >= 140 MM HG: CPT | Mod: CPTII,,, | Performed by: PHYSICIAN ASSISTANT

## 2023-05-16 PROCEDURE — 1159F PR MEDICATION LIST DOCUMENTED IN MEDICAL RECORD: ICD-10-PCS | Mod: CPTII,,, | Performed by: PHYSICIAN ASSISTANT

## 2023-05-16 PROCEDURE — 1160F RVW MEDS BY RX/DR IN RCRD: CPT | Mod: CPTII,,, | Performed by: PHYSICIAN ASSISTANT

## 2023-05-16 PROCEDURE — 95012 PR NITRIC OXIDE EXPIRED GAS DETERMINATION: ICD-10-PCS | Mod: ,,, | Performed by: INTERNAL MEDICINE

## 2023-05-16 PROCEDURE — 95012 NITRIC OXIDE EXP GAS DETER: CPT | Mod: ,,, | Performed by: INTERNAL MEDICINE

## 2023-05-16 PROCEDURE — 1100F PTFALLS ASSESS-DOCD GE2>/YR: CPT | Mod: CPTII,,, | Performed by: NURSE PRACTITIONER

## 2023-05-16 PROCEDURE — 99999 PR PBB SHADOW E&M-EST. PATIENT-LVL V: ICD-10-PCS | Mod: PBBFAC,,, | Performed by: NURSE PRACTITIONER

## 2023-05-16 PROCEDURE — 93005 ELECTROCARDIOGRAM TRACING: CPT

## 2023-05-16 PROCEDURE — 3288F PR FALLS RISK ASSESSMENT DOCUMENTED: ICD-10-PCS | Mod: CPTII,,, | Performed by: NURSE PRACTITIONER

## 2023-05-16 PROCEDURE — 93010 ELECTROCARDIOGRAM REPORT: CPT | Mod: ,,, | Performed by: INTERNAL MEDICINE

## 2023-05-16 PROCEDURE — 93010 EKG 12-LEAD: ICD-10-PCS | Mod: ,,, | Performed by: INTERNAL MEDICINE

## 2023-05-16 PROCEDURE — 3288F FALL RISK ASSESSMENT DOCD: CPT | Mod: CPTII,,, | Performed by: PHYSICIAN ASSISTANT

## 2023-05-16 PROCEDURE — 3077F PR MOST RECENT SYSTOLIC BLOOD PRESSURE >= 140 MM HG: ICD-10-PCS | Mod: CPTII,,, | Performed by: PHYSICIAN ASSISTANT

## 2023-05-16 RX ORDER — ALBUTEROL SULFATE 0.83 MG/ML
2.5 SOLUTION RESPIRATORY (INHALATION) EVERY 6 HOURS PRN
COMMUNITY
End: 2023-12-08

## 2023-05-16 RX ORDER — METHYLPREDNISOLONE 4 MG/1
TABLET ORAL
Qty: 1 EACH | Refills: 0 | Status: SHIPPED | OUTPATIENT
Start: 2023-05-16 | End: 2023-06-14

## 2023-05-16 RX ORDER — TIZANIDINE 4 MG/1
4-6 TABLET ORAL 2 TIMES DAILY PRN
Qty: 90 TABLET | Refills: 4 | Status: SHIPPED | OUTPATIENT
Start: 2023-05-16 | End: 2023-07-19

## 2023-05-16 NOTE — PROGRESS NOTES
Subjective:      Established patient    Patient ID: Shireen Felix is a 77 y.o. female.      Chief Complaint: Asthma      HPI   5/16/2023 Sravanthi BENTLEY  Shireen Felix female here for follow up on asthma review cpft/cxr/FeNO.     5/16/2023 CPFT Spirometry is normal. Lung volumes are normal. Mild reduced diffusing capacity. MVV is mildly decreased    5/16/2023 FeNO is 11, no inflammation of lungs.     5/16/2023 chest x-ray chronic interstitial findings, stable.    Current regimen: Trelegy 200 mcg. Albuterol inhaler. Albuterol nebs. Daliresp.     Patient reports stable no increased cough.  Areas complaint of chronic fatigue.  Her complaint of chronic fatigue pain.  Patient was worked up for sleep apnea no sleep apnea detected no significant hypoxemia seen during sleep.  She has complaint of noticing elevations in her blood pressure which she recently went to the emergency room on 05/13/2023.  She was noted to have mild congestive heart failure with elevated BNP and lower than normal sodium.  She has follow-up with Cardiology 5/17/2023.     4/26/2023 Sravanthi BENTLEY   Shireen Felix is a 77 y.o. female presents for follow up on asthma, SOB, Chronic UIP, chronic bronchitis, pulmonary nodules with review CT chest and 6MWD.  She also has been referred by neurology department for sleep apnea evaluation.     She has complaint fall yesterday with right rib pain. 4/26/2023 CT chest healing 7th and 8th rib fx, no pneumothorax.     She states her neurologist wanted her to have sleep study, Susan Contreras NP ordered sleep study on 4/12/2023 awaiting scheduling with sleep lab.     4/26/2023 CT Chest There are noncalcified pulmonary nodules in both lungs.  One of the larger ones measures 5 mm and is located in the right lower lobe.  On the prior examination it measured 6 mm.  There is a mild amount of honeycombing in the peripheral portion of both lungs.  There is no pneumothorax or pleural effusion.  10/26/2022 CPFT Normal  spirometry. Normal lung volumes. Moderate reduction in diffusion capacity.     She is followed in pulmonary for chronic UIP, moderate persistent asthma with chronic bronchitis.     Patients reports stable NYHA II dyspnea     + covid 19 12/31/2021, 2/24/2022, July 2022.     The patient does not have currently have symptoms.     She is remains improved since on Trelegy 200 mcg      Her current respiratory therapy regimen is TRELEGY 200 mcg. VENTOLIN inhaler, albuterol nebs, Daliresp 500 mcg.  She is  adherent with her regimen.      42 pack year former smoker. Quit 2004.         Asthma Control Test  In the past 4  weeks, how much of the time did your asthma keep you from getting as much done at work, school or at home?: A little of the time  During the past 4 weeks, how often have you had shortness of breath?: 3 to 6 times a week  During the past 4 weeks, how often did your asthma symptoms (wheezing, couging, shortness of breath, chest tightness or pain) wake you up at night or earlier than usual in the morning?: Not at all  During the past 4 weeks, how often have you used your rescue inhaler or nebulizer medication (such as albuterol)?: 1 or 2 times per day  How would you rate your asthma control during the past 4 weeks?: Well controlled  If your score is 19 or less, your asthma may not be under control: 18      Immunization status reviewed and up to date.      Patient has been referred by Susan Contreras NP, Neurology for sleep apnea evaluation.  Sleep Apnea evaluation  Patient has been observed snoring with frequent awakening. History of seizures, stroke, cerebral aneurysm with repair.   Patient reports non restful' sleep.  She denies morning headache.   She reports day time napping.  Day time napping duration 15 Minutes  She denies recent weight gain.  Cardiovascular risk factors: hypertension  Bed time is 1000, 1200  Wake time is 0500  Sleep onset is within  1 Hour.  Sleep maintenance difficulties related to early  morning awakening, frequent night time awakening, difficulty falling asleep, and non-restful sleep  Wake after sleep onset occurs five times a night.  Nocturia occurs one time a night,   Sleep aids : Yes, melatonin  Dry mouth : No  Sleep walking: No  Sleep talking : No  Sleep eating:No  Vivid Dreams : No  Cataplexy : No    Maurice Sleepiness Scale   EPWORTH SLEEPINESS SCALE 4/26/2023   Sitting and reading 1   Watching TV 3   Sitting, inactive in a public place (e.g. a theatre or a meeting) 0   As a passenger in a car for an hour without a break 0   Lying down to rest in the afternoon when circumstances permit 2   Sitting and talking to someone 0   Sitting quietly after a lunch without alcohol 1   In a car, while stopped for a few minutes in traffic 0   Total score 7       Neck circumference is 13.  Mallampati score 2    STOP - BANG Questionnaire:   1. Snoring : Do you snore loudly ?     YES  2. Tired : Do you often feel tired, fatigued, or sleepy during daytime?   YES  3. Observed: Has anyone observed you stop breathing during your sleep?    NO  4. Blood pressure : Do you have or are you being treated for high blood pressure?    YES  5. BMI :BMI more than 35 kg/m2?   NO  6. Age : Age over 50 yr old?    YES  7. Neck circumference: Neck circumference greater than 40 cm?   NO  8. Gender: Gender male?   NO    SCORE: 4    High risk of MEENA: Yes 5 - 8  Intermediate risk of MEENA: Yes 3 - 4  Low risk of MEENA: Yes 0 - 2       A full  review of systems, past , family  and social histories was performed except as mentioned in the note above, these are non contributory to the main issues discussed today.     Previous Report Reviewed: lab reports, office notes and radiology reports     The following portions of the patient's history were reviewed and updated as appropriate: She  has a past medical history of Abnormal ECG (8/5/2019), Aneurysm, Anticoagulant long-term use, Arthritis, CAD in native artery (8/5/2019), COPD (chronic  obstructive pulmonary disease), Diabetes mellitus, Fall (10/10/2019), Glaucoma, Hypertension, Seizures, Shingles (05/27/2017), and Stroke.  She  has a past surgical history that includes Brain surgery; Hysterectomy; Transforaminal epidural injection of steroid (Bilateral, 7/23/2019); Cardiac catheterization; Coronary angioplasty; Epidural steroid injection into lumbar spine (Bilateral, 11/12/2019); Injection of anesthetic agent around medial branch nerves innervating lumbar facet joint (Bilateral, 1/31/2020); Transforaminal epidural injection of steroid (Bilateral, 3/10/2020); Transforaminal epidural injection of steroid (Right, 6/19/2020); Injection of joint (Bilateral, 7/9/2020); Injection of anesthetic agent into sacroiliac joint (Right, 11/16/2021); and Selective injection of anesthetic agent around lumbar spinal nerve root by transforaminal approach (Bilateral, 4/26/2023).  Her family history includes No Known Problems in her father and mother.  She  reports that she quit smoking about 19 years ago. Her smoking use included cigarettes. She started smoking about 61 years ago. She has a 42.00 pack-year smoking history. She has never used smokeless tobacco. She reports that she does not drink alcohol and does not use drugs.  She has a current medication list which includes the following prescription(s): albuterol, albuterol, amlodipine, aptiom, atorvastatin, cholecalciferol (vitamin d3), clopidogrel, daliresp, denosumab, diclofenac, diltiazem, dorzolamide-timolol 2-0.5%, escitalopram oxalate, eslicarbazepine, fluticasone propionate, trelegy ellipta, gabapentin, hydrochlorothiazide, ipratropium, ketoconazole, latanoprost, levocetirizine, magnesium oxide, metformin, methylprednisolone, metoprolol tartrate, montelukast, myrbetriq, nitroglycerin, omeprazole, pantoprazole, potassium chloride sa, ranolazine, tizanidine, triamcinolone acetonide 0.025%, valsartan, and nortriptyline.  She is allergic to penicillins,  "adhesive, betadine [povidone-iodine], doxycycline, oxycodone, sulfa (sulfonamide antibiotics), and zanaflex [tizanidine]..    Review of Systems   Constitutional:  Positive for fatigue. Negative for fever and chills.   HENT:  Positive for hearing loss. Negative for nosebleeds, rhinorrhea and congestion.    Eyes:  Negative for redness.   Respiratory:  Positive for snoring and use of rescue inhaler. Negative for cough, sputum production, choking, wheezing and dyspnea on extertion.    Genitourinary:  Negative for hematuria.   Endocrine: Diabetes melllitus Negative for cold intolerance.    Musculoskeletal:  Positive for arthralgias and back pain.   Skin:  Negative for rash.   Gastrointestinal:  Positive for acid reflux. Negative for vomiting.   Neurological:  Negative for syncope and headaches.        History of stroke  Seizure disorder.  Post herpetic neuralgia   Hematological:  Negative for adenopathy. Bleeds easily and excessive bruising.   Psychiatric/Behavioral:  Negative for confusion.       Objective:     BP (!) 166/80   Pulse 68   Resp 16   Ht 5' 4" (1.626 m)   Wt 56.4 kg (124 lb 5.4 oz)   LMP  (LMP Unknown)   SpO2 99%   BMI 21.34 kg/m²   Body mass index is 21.34 kg/m².     Physical Exam  Vitals and nursing note reviewed.   Constitutional:       General: She is not in acute distress.     Appearance: Normal appearance. She is well-developed. She is not ill-appearing or toxic-appearing.   HENT:      Head: Normocephalic and atraumatic.      Right Ear: Tympanic membrane and external ear normal.      Left Ear: Tympanic membrane and external ear normal.      Nose: Nose normal.      Mouth/Throat:      Pharynx: No oropharyngeal exudate.   Eyes:      Conjunctiva/sclera: Conjunctivae normal.      Pupils: Pupils are equal, round, and reactive to light.   Cardiovascular:      Rate and Rhythm: Normal rate and regular rhythm.      Heart sounds: Normal heart sounds. No murmur heard.  Pulmonary:      Effort: Pulmonary " effort is normal. No respiratory distress.      Breath sounds: Normal breath sounds. No stridor.      Comments: Velcro breath sounds in posterior bases.  Abdominal:      General: Bowel sounds are normal.      Palpations: Abdomen is soft.   Musculoskeletal:         General: No tenderness. Normal range of motion.      Cervical back: Normal range of motion and neck supple.   Lymphadenopathy:      Cervical: No cervical adenopathy.   Skin:     General: Skin is warm and dry.      Coloration: Skin is not pale.   Neurological:      Mental Status: She is alert and oriented to person, place, and time.   Psychiatric:         Behavior: Behavior normal. Behavior is cooperative.         Thought Content: Thought content normal.         Judgment: Judgment normal.       Personal Diagnostic Review    5/16/2023 complete PFT Spirometry is normal. Spirometry remains unimproved following bronchodilator. Lung volume determination is normal. Airway mechanics show normal airway resistance and conductance. DLCO is mildly decreased. MVV is mildly decreased    5/16/2023 FeNO is 11, no inflammation of lungs.   Clinical Guide to Interpretation or FeNO Levels :     FeNO  (ppb) LOW INTERMEDIATE HIGH   ADULT VALUES < 25 25-50          > 50   Th2-driven Inflammation Unlikely Likely Significant      Patients FeNO level at this visit : __11__ (ppb)     Interpretation of FeNO measurement in adults:  [x] FENO is less than 25 ppb implies non eosinophilic airway inflammation or the absence of airway inflammation.               Comment: Low FENO (<25 ppb) in adult asthmatics with persistent symptoms suggests other etiologies for these symptoms and a lower likelihood of benefit from adding or increasing inhaled glucocorticoids.    4/26/2023 6MWD No desaturations requiring supplemental oxygen at rest or exertion.  Exercise capacity is less than predicted  Phase Oxygen Assessment Supplemental O2 Heart   Rate Blood Pressure Paulino Dyspnea Scale Rating   Resting  100 % Room Air 61 bpm 173/81 1   Exercise             Minute             1 100 % Room Air 67 bpm       2 100 % Room Air 70 bpm       3 100 % Room Air 69 bpm       4 100 % Room Air 69 bpm       5 100 % Room Air 69 bpm       6  100 % Room Air 69 bpm 186/76 3   Recovery             Minute             1 100 % Room Air 65 bpm       2 100 % Room Air 64 bpm       3 100 % Room Air 62 bpm       4 100 % Room Air 63 bpm 176/76 3      Six Minute Walk Summary  6MWT Status: completed without stopping  Patient Reported: Dyspnea, Leg pain (hip pain)             Interpretation:  Did the patient stop or pause?: No  Total Time Walked (Calculated): 360 seconds  Final Partial Lap Distance (feet): 50 feet  Total Distance Meters (Calculated): 198.12 meters  Predicted Distance Meters (Calculated): 430.2 meters  Percentage of Predicted (Calculated): 46.05  Peak VO2 (Calculated): 9.92  Mets: 2.83  Has The Patient Had a Previous Six Minute Walk Test?: No     Previous 6MWT Results  Has The Patient Had a Previous Six Minute Walk Test?: No      10/26/2022 CPFT Normal spirometry. Normal lung volumes. Moderate reduction in diffusion capacity - unadjusted for hemoglobin. (DLCO > or equal to 40% and < 60% predicted). This interpretation of diffusing capacity does not take into account the patient's hemoglobin level (Unavailable at the time of testing - hemoglobin assumed to be normal). Flow volume loops demonstrate a restrictive defect.     X-Ray Chest PA And Lateral  Narrative: EXAMINATION:  XR CHEST PA AND LATERAL    CLINICAL HISTORY:  Mucopurulent chronic bronchitis    TECHNIQUE:  PA and lateral views of the chest were performed.    COMPARISON:  Prior radiographs    FINDINGS:  Cardiac silhouette and mediastinal contours are normal.  Lungs demonstrates similar chronic interstitial coarsening without acute opacity.  No pleural effusion.  Osseous structures are intact.  Impression: Stable chest.    Electronically signed by: Davy Gutierrez  MD  Date:    2023  Time:    13:36      Pulmonary function tests:20 :  FEV1: 2.13 (103.5 % predicted), FVC:  2.44 (91.0 % predicted), FEV1/FVC:  87.75, T.47 ( 90.1 % predicted) RV /TLC 45 ( 102% predicted, DLCO: 14.51 (70.2 % predicted)  Normal spirometry. Lung volumes not done. Diffusion capacity is mildly reduced but corrects for alveolar volume.       Assessment / Plan:       Discussed diagnosis, its evaluation, treatment and usual course. All questions answered.  Requested Prescriptions      No prescriptions requested or ordered in this encounter       Problem List Items Addressed This Visit       Mucopurulent chronic bronchitis (Chronic)    Current Assessment & Plan     MCB ROS: taking medications as instructed, no medication side effects noted.   New concerns: NONE.   Exam:  rales noted BILATERALLY.   Assessment:  MCB stable.   Plan: VENTOLIN .DUONEB, TRELEGY 200, Daliresp.            Pulmonary interstitial fibrosis    Overview     Stable   2023 complete PFT Spirometry is normal. Spirometry remains unimproved following bronchodilator. Lung volume determination is normal. Airway mechanics show normal airway resistance and conductance. DLCO is mildly decreased. MVV is mildly decreased.  2023 PSG no ryan AHI 2.9.  SpO2 was below 88% for 2.4 minutes.  2023 6MWD 100% SaO2 duration of walk  2023 CT chest mild amount of honeycombing in the peripheral portion of both lungs.   2020 CTA Stable pattern peripheral septal thickening most notable involving the bibasilar distribution with mild bibasilar honeycombing.  2019 CT chest There are chronic changes of interstitial lung disease with mild lower lobe bronchiectasis.  Early honeycombing and subpleural changes seen in the lung bases bilaterally.  Fine increased reticulonodular interstitial markings are seen juxtapleural regions as well.  2019 CT chest Chronic interstitial changes with juxtapleural reticulations with mild  lower lobe bronchiectasis and suggestion of mild honeycombing at the lung bases.           Current Assessment & Plan     Stable.  Exam velcro breath sounds in bases, otherwise clear.  5/16/2023 chest xray stable ILD.  5/16/2023 complete PFT Spirometry is normal. Spirometry remains unimproved following bronchodilator. Lung volume determination is normal. Airway mechanics show normal airway resistance and conductance. DLCO is mildly decreased. MVV is mildly decreased.    4/26/2023 6MWD 100% SaO2 duration of walk.   4/26/2023 CT chest mild amount of honeycombing in the peripheral portion of both lungs  Follow up 1 year repeat CT chest                 Relevant Orders    Complete PFT with bronchodilator    Primary snoring    Overview     5/4/2023 PSG No MEENA AHI 2.9. SpO2 marko was 59%, however SpO2 was below 88% for 2.4 minutes. Primary snoring detected. Interventions for snoring may be considered: see ENT           Current Assessment & Plan     5/4/2023 PSG no meena AHI 2.9.  SpO2 was below 88% for 2.4 minutes. Primary snoring detected.  If interventions for snoring desired see ENT or dentist or try over-the-counter oral appliance           Primary insomnia    Overview     5/4/2023 PSG no meena AHI 2.9.  SpO2 was below 88% for 2.4 minutes.  Continue Restoril 15mg Q HS and management with primary care provider              Current Assessment & Plan     5/4/2023 PSG no meena AHI 2.9.  SpO2 was below 88% for 2.4 minutes.           Moderate persistent asthma without complication - Primary    Overview     Trelegy 200 mcg triple therapy. Daliresp, Albuterol inhaler, albuterol nebs.     5/16/2023 FeNO is 11, no inflammation of lungs.    5/16/2023 complete PFT Spirometry is normal. Spirometry remains unimproved following bronchodilator. Lung volume determination is normal. Airway mechanics show normal airway resistance and conductance. DLCO is mildly decreased. MVV is mildly decreased                Current Assessment & Plan     The  current medical regimen is effective;  continue present plan and medications Trelegy 200 mcg triple therapy. Daliresp, Albuterol inhaler, albuterol nebs.     5/16/2023 FeNO is 11, no inflammation of lungs.    5/16/2023 complete PFT Spirometry is normal. Spirometry remains unimproved following bronchodilator. Lung volume determination is normal. Airway mechanics show normal airway resistance and conductance. DLCO is mildly decreased. MVV is mildly decreased                Relevant Orders    Complete PFT with bronchodilator    Chronic diastolic heart failure    Current Assessment & Plan     Stable, managed by cardiology               I spent a total of 37 minutes on the day of the visit.  This includes face to face time and non-face to face time preparing to see the patient (eg, review of tests), obtaining and/or reviewing separately obtained history, documenting clinical information in the electronic or other health record, independently interpreting results and communicating results to the patient/family/caregiver, or care coordinator.    Follow up in about 1 year (around 5/16/2024) for Asthma/ILD review cpft .

## 2023-05-16 NOTE — ASSESSMENT & PLAN NOTE
Stable.  Exam velcro breath sounds in bases, otherwise clear.  5/16/2023 chest xray stable ILD.  5/16/2023 complete PFT Spirometry is normal. Spirometry remains unimproved following bronchodilator. Lung volume determination is normal. Airway mechanics show normal airway resistance and conductance. DLCO is mildly decreased. MVV is mildly decreased.    4/26/2023 6MWD 100% SaO2 duration of walk.   4/26/2023 CT chest mild amount of honeycombing in the peripheral portion of both lungs  Follow up 1 year repeat CT chest

## 2023-05-16 NOTE — PROGRESS NOTES
Established Patient Chronic Pain Clinic Visit    Chief Pain Complaint:  Neck Pain   Lumbar Back Pain   Facial Pain     Interval History (5/16/2023): Shireen Felxi presents today for follow-up visit.  she underwent bilateral L4/5 TFESI on 4/26/23.  The patient reports that she is/was better following the procedure.  she reports 80% pain relief.  The changes lasted 4 weeks so far.  The changes have continued through this visit.  Patient reports pain as 7/10 today. She reports the procedure itself was very painful. Reports she did not feel sedated at all compared to previous procedures with other providers. She is not interested in pursuing additional procedures secondary to the painful procedure.  She reports worsening neck pain with radiation into her right arm down to her hand with cramping and burning.    Interval History (3/9/2023):  Shireen Felix presents today for follow-up visit for CT and EMG review.  Patient was last seen on 2/1/2023. At that visit, the plan was to order updated CT of her cervical and lumbar spine as well as EMG of her bilateral upper extremities. Patient reports pain as 7/10 today.  She reports worsening neck pain with radiation into her left arm down to her hand with cramping and burning.  She also reports worsening symptoms down both legs.  interfering with her ability to walk at times due to significant weakness. Has an appointment with Dr. Navarro on 03/21/2023. Patient fell hit head on concrete 2-11-23, and had a seizure shortly after.  She reports repeated falls due to her legs giving out from underneath her unexpectedly. Referred to Neurology.  She is also currently on antibiotics secondary to having 10 teeth pulled    EMG/NCS BUE 02/24/2023  IMPRESSION  1. ABNORMAL study  2. There is electrodiagnostic evidence of a SEVERE demyelinating and axonal median neuropathy (Carpal tunnel syndrome) across the LEFT wrist, a moderate demyelinating CTS across the RIGHT wrist and a  MODERATE-SEVERE demyelinating and axonal ulnar neuropathy (Cubital tunnel syndrome) across the RIGHT elbow.  There is an acute on chronic radiculopathy of the LEFT C6 nerve root and a subacute on chronic of the RIGHT C4 and C5 nerve roots and a CHRONIC radiculopathy of bilateral C4-T1 nerve roots    CT cervical spine 02/07/2023  FINDINGS:  There is a mild amount of dextroconvex curvature of the cervical spine.  There is reversal of the normal cervical lordosis.  There is grade 1 anterolisthesis of C3 on C4.  There is grade 1 anterolisthesis of C4 on C5.  There is no fracture.  There are mild degenerative changes between C5 and C7.  There is a mild amount of atherosclerosis.  There is moderate partial opacification of the right mastoid air cells.     Impression:     1. There is a mild amount of dextroconvex curvature of the cervical spine.  2. There is reversal of the normal cervical lordosis.  3. There is grade 1 anterolisthesis of C3 on C4.  4. There is grade 1 anterolisthesis of C4 on C5.  5. There are mild degenerative changes between C5 and C7.  6. There is a mild amount of atherosclerosis.  7. There is moderate partial opacification of the right mastoid air cells.  All CT scans at this facility use dose modulation, iterative reconstruction, and/or weight base dosing when appropriate to reduce radiation dose when appropriate to reduce radiation dose to as low as reasonably achievable.     CT lumbar spine 02/07/2023  FINDINGS:  Mild levoscoliosis.     Essentially stable L2 compression fracture.  Chronic appearing deformities of the T12 and L1 vertebral bodies with Schmorl's nodes and height loss.  Stable sclerotic focus in the L5 vertebral body.  No acute fracture.     Multilevel vacuum disc phenomenon from L1/L2 through L5/S1.  Near complete loss of disc space height at the L3/L4 and L4/L5 levels.     Multilevel degenerative changes to include disc bulges, facet arthropathy, and ligamentum flavum hypertrophy.      Discs bulge at the L 1/L2 level on the left may abut the traversing nerve root.     Disc bulge and facet hypertrophy, and ligamentum flavum hypertrophy contribute to moderate central canal stenosis at the L2/L3 level.     Partially calcified disc bulge at the L 3/L4 level contributes to severe right and moderate left neural foraminal narrowing.  There is also moderate to severe central canal stenosis at the L3/L4 level.     Moderate to severe central canal stenosis at the L4/L5 level posterior disc bulge abuts the L4/L5 right nerve root.  There is severe left neural foraminal narrowing at the L4/L5 level.     Impression:     Text no acute osseous finding.  Moderate to severe degenerative changes of the lumbar spine, as above.     Interval History (1/31/2023):  Shireen Felix presents today for follow-up visit.  Patient was last seen on 10/4/2022. At that visit, the plan was to schedule bilateral L3-5 MBB followed 2 weeks later by XANDER.  She canceled these procedures and reports at this time she is not interested in scheduling.  She reports she has a  and goes to the gym 3-4 times per week which includes walking on a treadmill.   Patient reports pain as 7/10 today. She reports worsening neck pain with radiation into her left arm down to her hand with cramping and burning. This has been severe over the past week. Last cervical CT in 2019, she reports she has sustained multiple falls since then. She also reports worsening symptoms down her left leg, interfering with her ability to walk at times due to significant weakness.      Interval history 10/04/2022  Ms. Felix is a 76-year-old female with past medical history significant for cerebral aneurysm status post craniotomy and repair, cerebral vascular accident, left V1 distribution post herpetic neuralgia, depression, glaucoma, asthma/COPD, coronary artery disease, GERD, nicotine dependence, type 2 diabetes who presents to [facility], previous  Dr. Dias and Dr. Fulton patient.  Today patient reports pain in the lower back and legs.  Pain is constant today is rated 7/10.  Patient reports pain in a bandlike distribution in the lower back which radiates down the posterior aspects of bilateral lower extremities in L5-S1 distribution to mid thigh.  Pain is described as burning and aching in nature.  Pain is exacerbated when moving from sitting to standing and standing and ambulating just a few feet.  Patient does report associated weakness in the lower extremities associated with her pain.  Patient reports she is able to ambulate with assistive device, walker only a few feet before requiring rest.  Pain has been improved with prior procedures, including medial branch block and transforaminal epidural steroid injection.  Patient is interested in pursuing repeat injection.  Of note patient has trialed medicinal marijuana with Dr. Mckeon and reports palpitations side effect.  Patient has discontinued tramadol, tizanidine and gabapentin.  Pain is improved with prednisone which she takes prescribed by her pulmonologist.    Interval history:  Dr. Fulton:  05/10/2021-05/06/2022  Shireen Felix is a 76 y.o. female  who is presenting with a chief complaint of Neck Pain. The patient began experiencing this problem insidiously, and the pain has been gradually worsening over the past 6 month(s). The pain is described as throbbing, cramping, aching and heavy and is located in the bilateral cervical spine. Pain is intermittent and lasts hours. The  pain is nonradiating. The patient rates her pain a 7 out of ten and interferes with activities of daily living a 7 out of ten. Pain is exacerbated by rotation of the cervical spine, and is improved by rest. Patient reports no prior trauma, no prior spinal surgery      This patient is a 74 y.o. female who presents today complaining of the above noted pain/s. The patient describes the pain as follows.  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  She reports approximately 2 years ago she had to sudden deaths in the family which caused very stressful time for her and resulted in shingles rash in the left V1 distribution however she reports having excruciating pain in the left V1 and V2 distributions.  She denies having difficulty with eating food and brushing her teeth on the left side of her face however the left lateral side of her nose gets her excruciating pain.  She has been tried on numerous medications in the past including gabapentin, Lyrica, Valtrex, Celebrex, ibuprofen which have all provided minimal benefit however steroids have been most helpful.  Today she rates her pain as an 4/10 and describes a constant burning sensation the left side of her face in addition to radiation into her bilateral lower extremities, her right shoulder, her left upper extremity occasionally as a pins and needle sensation.  She does report having numbness and weakness in her left lower extremity.  She has been physical therapy which she completed in February of 2019 however this caused most of her low back and leg symptoms to worsen in addition to her post herpetic neuralgia to worsen.  She denies having bowel bladder difficulties.  Her symptoms are worse with activity such as walking in her somewhat improved with rest however she had finds it difficult to get into a comfortable position. She has been using topical lidocaine patches and applying to the left side of her face which does provide significant benefit.  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     Shireen Felix is a 76 y.o. female  who is presenting with a chief complaint of lumbar spine. The patient began  experiencing this problem insidiously, and the pain has been gradually worsening over the past 2 year(s). The pain is described as throbbing, shooting, burning, aching and electrical and is located in the bilateral lumbar spine. Pain is intermittent and lasts hours. The pain radiates to bilateral lower extremities. The patient rates her pain a 7 out of ten and interferes with activities of daily living a 6 out of ten. Pain is exacerbated by flexion of the lumbar spine, and is improved by rest. Patient reports no prior trauma, no prior spinal surgery     Interval HPI 06/03/2020: Dr. Dias:  Ms. Felix returns to clinic for follow-up.  She underwent a T12-L1 transforaminal epidural steroid injection on March 10, 2020 and she reports that this has provided significant pain relief for her which radiated into her right leg prior to the injection.  She does report that his symptoms have returned and the injections worn off her pain is currently rated 7/10.  She was scheduled to undergo bilateral lumbar radiofrequency ablation prior to corona virus however she would like to hold off on that at this time and pursue repeat injection in the low back.  Unfortunately she also reports that she had her wheelchair fall over a few weeks ago and this has resulted in bilateral shoulder pain.  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  She finds that rest does provide some pain relief on activity causes her symptoms to worsen.  She has been able to walk significantly more after her most recent injection in March reported she is able to walk several steps before having to sit rest however this has a vast improvement from prior to the injection.    Interval HPI:  02/12/2020; Dr. Dias  Ms. Felix returns to clinic for follow-up.  She underwent bilateral lumbar medial branch blocks on January 31, 2020 and reports 100% symptomatic pain  relief for approximately 1 week and his symptoms slowly began to return.  She continues to have some right-sided lower thoracic posterior pain which was not completely dressed with the medial branch blocks.  Today she rates her pain as an 8/10 which is located primarily in the axial lumbar spine and the lower right posterior thoracic region.  She denies having numbness weakness in the legs but does continue to have a constant aching and throbbing sensation in the low back.    Interval HPI 01/23/2020: Dr. Dias: Ms. Felix returns to clinic for follow-up.  She reports that she underwent bilateral L4/5 transforaminal epidural steroid injections in November 2019 reports ongoing 80% symptomatic pain relief.  She has also been participating physical therapy which she has found to be very helpful however 4 days ago she started to do some leg raise is in her bed and she felt severe pain in her low back which has not subsided.  She denies having any radiation except for around her flank into her anterior abdomen.  She feels like the pain sometimes comes straight through from her back to her abdomen.  Today she rates her pain as a 10/10 describes it as a constant aching and sharp pain. She has been using a heating pad which is minimally helpful and she has been holding off on physical therapy at this time. She denies having bowel bladder difficulty.     Interval History: Patient was seen on 8/4/19. At that time she underwent bilateral L3/4 TF XANDER.  The patient reports that she is/was better following the procedure.  she reports 75% pain relief.  She had a fall, then pain increased. She is currently living at [facility]. She is doing at home therapy there, which is helping. She is in wheelchair now but hopes to transition to walker soon.  After the fall, she was not able to get out of the bed.      Initial History of Present Illness:  Dr. Dias  This patient is a 74 y.o. female who presents today complaining of the above  noted pain/s. The patient describes the pain as follows.  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  She reports approximately 2 years ago she had to sudden deaths in the family which caused very stressful time for her and resulted in shingles rash in the left V1 distribution however she reports having excruciating pain in the left V1 and V2 distributions.  She denies having difficulty with eating food and brushing her teeth on the left side of her face however the left lateral side of her nose gets her excruciating pain.  She has been tried on numerous medications in the past including gabapentin, Lyrica, Valtrex, Celebrex, ibuprofen which have all provided minimal benefit however steroids have been most helpful.  Today she rates her pain as an 8/10 and describes a constant burning sensation the left side of her face in addition to radiation into her bilateral lower extremities, her right shoulder, her left upper extremity occasionally as a pins and needle sensation.  She does report having numbness and weakness in her left lower extremity.  She has been physical therapy which she completed in February of 2019 however this caused most of her low back and leg symptoms to worsen in addition to her post herpetic neuralgia to worsen.  She denies having bowel bladder difficulties.  Her symptoms are worse with activity such as walking in her somewhat improved with rest however she had finds it difficult to get into a comfortable position. She has been using topical lidocaine patches and applying to the left side of her face which does provide significant benefit.  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    - pertinent negatives: No fever, No  chills, No weight loss, No bladder dysfunction, No bowel dysfunction, No saddle anesthesia  - pertinent positives: generalized nonspecific Lower Extremity weakness bilaterally    - medications, other therapies tried (physical therapy, injections):     >> NSAIDs, Tylenol, Tramadol, Norco, gabapentin, lyrica, flexeril and medrol dose pack    >> Has previously undergone Physical Therapy      Pain injections:  -bilateral L4/5 TFESI on 4/26/23 with 80% relief  -11/16/2021: Right-sided SI joint and greater trochanteric bursa injection; Dr. Fulton  -07/09/2020: Bilateral glenohumeral joint injection; Dr. Dias  -06/19/2020: Right-sided T12/L1 transforaminal epidural steroid injection; Dr. Dias  -03/10/2020:  T12/L1 transforaminal epidural steroid injection; Dr. Dias  -01/31/2020: Bilateral L3-5 medial branch blocks; Dr. Dias  -11/12/2019: Bilateral L4/5 transforaminal epidural steroid injection; Dr. Dias      Imaging / Labs / Studies (reviewed on 5/16/2023):  02/06/20    CT Lumbar Spine Without Contrast    Narrative  EXAMINATION:  CT LUMBAR SPINE WITHOUT CONTRAST    CLINICAL HISTORY:  Low back painLow back pain, >6wks conservative tx, persistent-progressive sx, surgical candidate;    FINDINGS:  The lumbar vertebral bodies demonstrate adequate alignment.Disc space narrowing from T11 through S1.No acute fractures are identified.    T11-T12: Disc space narrowing.  Broad-based disc bulge.  Old vertebral endplate fracture of T12.    T12-L1: Small disc protrusion paramedian to the right side.  Degenerative changes of the vertebral endplates and facet joints.    L1-L2: Old fracture of the inferior endplate of L1.  Mild posterior displacement of a fracture fragment minimally narrowing the AP diameter of the canal.  Gas in the disc space consistent with degenerating disc material.    L2-L3: Circumferential disc bulge.  Bilateral degenerative changes of the facets.  Hypertrophy of the ligamentum flavum.  Moderate to severe  central spinal stenosis.  Bony neural foraminal narrowing without definite nerve root impingement.    L3-L4: Circumferential disc bulge.  Bilateral degenerative changes of the facets.  Degenerative changes of the vertebral endplates.  Severe central spinal stenosis.  Hypertrophy of the ligamentum flavum and degenerative changes of the facets.    L4-L5: Circumferential disc bulge.  Severe central spinal stenosis.  Bilateral degenerative changes of the facets.  Bilateral bony neural foraminal narrowing and possible bilateral L4 nerve root impingement.    L5-S1: Circumferential disc bulge.  Gas formation within the disc.  Moderate central spinal stenosis.  Bilateral degenerative changes of the facets.  Bilateral bony neural foraminal narrowing with possible bilateral L5 nerve root impingement.    Atherosclerotic abdominal aorta without aneurysmal dilatation.     07/31/19    CT Cervical Spine Without Contrast    FINDINGS:  Osteopenia.  Grade 2 degenerative spondylolisthesis at C3-C4.  Grade 1 degenerative spondylolisthesis at C4-C5.  Vertebral body height is normal.  No acute fractures. No prevertebral soft tissue swelling. Atlanto-occipital articulation is normal.  Congenitally patulous spinal canal.  No significant spinal stenosis.  Moderate left foraminal stenosis at C3-C4.    01/23/20    X-Ray Lumbar Spine Ap And Lateral  FINDINGS:  Levoscoliosis present.  Vertebral body heights stable.  Alignment unchanged without spondylolisthesis.  Similar appearing multilevel degenerative disc height loss and osteophyte findings noted.  Multilevel facet degenerative findings remain.  Aorta iliac atherosclerotic calcification.  Colonic constipation findings present.    6/04/19    X-Ray Cervical Spine AP And Lateral    FINDINGS:  Reversal normal C-spine curvature noted on the lateral view with visualization to the C7-T1 level.  Minimal anterior subluxation C4 on C5 noted.  There is multilevel marginal spurring and varying degrees  of loss of disc height throughout the C-spine.  No acute fracture identified in prevertebral soft tissues, C1-2 articulation and odontoid appear within normal limits allowing for positioning.  Minimal vascular calcification identified on the left in the area of the carotid vessels.    Review of Systems:  CONSTITUTIONAL: patient denies any fever, chills, or weight loss  SKIN: patient denies any rash or itching  RESPIRATORY: patient denies having any shortness of breath  GASTROINTESTINAL: patient denies having any diarrhea, constipation, or bowel incontinence  GENITOURINARY: patient denies having any abnormal bladder function    MUSCULOSKELETAL:  - patient complains of the above noted pain/s (see chief pain complaint)    NEUROLOGICAL:   - pain as above  - strength in Upper and Lower extremities is decreased, BILATERALLY  - sensation in Upper extremities is intact, BILATERALLY  - patient denies any loss of bowel or bladder control      PSYCHIATRIC: patient denies any change in mood    Other:  All other systems reviewed and are negative      Physical Exam:  BP (!) 146/73   Pulse 69   LMP  (LMP Unknown)  (reviewed on 5/16/2023)  Physical Exam    GENERAL: Well appearing, in no acute distress, alert and oriented x3.  PSYCH:  Mood and affect appropriate.  SKIN: Skin color, texture, turgor normal, no rashes or lesions.  HEAD/FACE:  Normocephalic, bruising along right lower jaw, Cranial nerves grossly intact.    CV: RRR with palpation of the radial artery.  PULM: No evidence of respiratory difficulty, symmetric chest rise.  GI:  Soft and non-tender.  Neck: TTP along cervical facet joints, positive facet loading, spurlings positive bilaterally, pain with flexion, extension, and rotation - right greater than left  BACK: Straight leg raising in the sitting and supine positions is negative to radicular pain. pain to palpation over the facet joints of the lumbar spine or spinous processes.  Reduced range of motion with pain  reproduction   EXTREMITIES:  Peripheral joint range of motion is reduced with pain with obvious instability in bilateral lower extremities.  No deformities, edema, or skin discoloration. Good capillary refill.  MUSCULOSKELETAL:  Unable to stand on heels and toes  hip, and knee provocative maneuvers are negative.  There is no pain with palpation over the sacroiliac joints bilaterally.  Gaenslen's, Distraction/Compression and  FABERs test is negative.  Facet loading test is positive bilaterally.   Bilateral upper and lower extremity strength is normal and symmetric.  No atrophy or tone abnormalities are noted.    RIGHT Lower extremity: Hip flexion 5/5, Hip Abduction 5/5, Hip Adduction 5/5, Knee extension 5/5, Knee flexion 5/5, Ankle dorsiflexion5/5, Extensor hallucis longus 5/5, Ankle plantarflexion 5/5  LEFT Lower extremity:  Hip flexion 5/5, Hip Abduction 5/5,Hip Adduction 5/5, Knee extension 5/5, Knee flexion 5/5, Ankle dorsiflexion 5/5, Extensor hallucis longus 5/5, Ankle plantarflexion 5/5  -Normal testing knee (patellar) jerk and ankle (achilles) jerk    NEURO: Bilateral upper and lower extremity coordination and muscle stretch reflexes are physiologic and symmetric. No loss of sensation is noted.  GAIT:  Patient presents in wheelchair today.  Antalgic gait.      Assessment:    Shireen Jeffreyrick Felix is a 77 y.o. year old female who is presenting with   Encounter Diagnoses   Name Primary?    Myofascial muscle pain     Moderate persistent asthma without complication     Moderate persistent asthma with (acute) exacerbation     Cervicalgia     Lumbar radiculopathy, chronic                Plan:    1. Interventional: Consider cervical XANDER, would like to hold off on any additional injections  -bilateral L4/5 TFESI on 4/26/23 with 80% relief    Refer to Neurosurgery if no improvement with this injection  Anticoagulation:  Yes, Plavix  Per ASA guidelines for secondary prophylaxis, Dr. Felix -need clearance to hold Plavix  5 days prior to each epidural procedure  Patient must also have completed antibiotics at least 2 weeks prior to injections    2. Pharmacologic:   Continue Gabapentin 100 mg BID per outside provider  Continue tizanidine 4-8 mg prn pain  - Will prescribe Medrol Dose pack with instructions - for acute pain flare:  Day 1: 2 tablets before breakfast, 1 tablet after lunch, 1 tablet after dinner, 2 tablets at bedtime   Day 2: 1 tablet before breakfast, 1 tablet after lunch, 1 tablet after dinner, 2 tablets at bedtime   Day 3: 1 tablet before breakfast, 1 tablet after lunch, 1 tablet after dinner, 1 tablet at bedtime   Day 4: 1 tablet before breakfast, 1 tablet after lunch, 1 tablet at bedtime   Day 5: 1 tablet before breakfast, 1 tablet at bedtime   Day 6: 1 tablet before breakfast.        3. Rehabilitative:   -We discussed initiating physical therapy to help manage the patient/s painful condition. The patient was counseled that muscle strengthening will improve the long term prognosis in regards to pain and may also help increase range of motion and mobility. They were told that one of the goals of physical therapy is that they learn how to do the exercises so that they can do them independently at home daily upon completion. The patient's questions were answered and they were agreeable to this course. A referral for physical therapy was provided to the patient at previous visit she is scheduled to start this week.      4. Diagnostic:  CT of cervical spine and lumbar spine reviewed.  Lumbar CT demonstrates severe spinal stenosis at L4-5 and L5-S1 levels.  EMG of bilateral upper extremities demonstrates severe carpal tunnel syndrome and cervical radiculopathy    5. Follow up:  8 weeks or PRN    Alanis Peres PA-C          The above plan and management options were discussed at length with patient. Patient is in agreement with the above and verbalized understanding.    - I discussed the goals of interventional chronic  pain management with the patient on today's visit. We discussed a multimodal and systematic approach to pain.  This includes diagnostic and therapeutic injections, adjuvant pharmacologic treatment, physical therapy, and at times psychiatry.  I emphasized the importance of regular exercise, core strengthening and stretching, diet and weight loss as a cornerstone of long-term pain management.    - This condition does not require this patient to take time off of work, and the primary goal of our Pain Management services is to improve the patient's functional capacity.  - Patient Questions: Answered all of the patient's questions regarding diagnoses, therapy, treatment and next steps    Disclaimer:  This note may have been prepared using voice recognition software, it may have not been extensively proofed, as such there could be errors within the text such as sound alike errors.

## 2023-05-16 NOTE — ASSESSMENT & PLAN NOTE
The current medical regimen is effective;  continue present plan and medications Trelegy 200 mcg triple therapy. Daliresp, Albuterol inhaler, albuterol nebs.     5/16/2023 FeNO is 11, no inflammation of lungs.    5/16/2023 complete PFT Spirometry is normal. Spirometry remains unimproved following bronchodilator. Lung volume determination is normal. Airway mechanics show normal airway resistance and conductance. DLCO is mildly decreased. MVV is mildly decreased

## 2023-05-16 NOTE — ASSESSMENT & PLAN NOTE
5/4/2023 PSG no ryan AHI 2.9.  SpO2 was below 88% for 2.4 minutes. Primary snoring detected.  If interventions for snoring desired see ENT or dentist or try over-the-counter oral appliance

## 2023-05-17 ENCOUNTER — INFUSION (OUTPATIENT)
Dept: INFUSION THERAPY | Facility: HOSPITAL | Age: 78
End: 2023-05-17
Attending: INTERNAL MEDICINE
Payer: MEDICARE

## 2023-05-17 ENCOUNTER — OFFICE VISIT (OUTPATIENT)
Dept: CARDIOLOGY | Facility: CLINIC | Age: 78
End: 2023-05-17
Payer: MEDICARE

## 2023-05-17 ENCOUNTER — TELEPHONE (OUTPATIENT)
Dept: CARDIOLOGY | Facility: HOSPITAL | Age: 78
End: 2023-05-17
Payer: MEDICARE

## 2023-05-17 ENCOUNTER — PATIENT MESSAGE (OUTPATIENT)
Dept: RHEUMATOLOGY | Facility: CLINIC | Age: 78
End: 2023-05-17
Payer: MEDICARE

## 2023-05-17 VITALS
BODY MASS INDEX: 21.23 KG/M2 | HEART RATE: 70 BPM | WEIGHT: 123.69 LBS | DIASTOLIC BLOOD PRESSURE: 78 MMHG | OXYGEN SATURATION: 98 % | SYSTOLIC BLOOD PRESSURE: 132 MMHG

## 2023-05-17 VITALS
TEMPERATURE: 97 F | SYSTOLIC BLOOD PRESSURE: 175 MMHG | DIASTOLIC BLOOD PRESSURE: 68 MMHG | OXYGEN SATURATION: 100 % | HEART RATE: 68 BPM | RESPIRATION RATE: 16 BRPM
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
## 2023-05-17 DIAGNOSIS — I50.32 CHRONIC DIASTOLIC HEART FAILURE: ICD-10-CM

## 2023-05-17 DIAGNOSIS — N18.30 CONTROLLED TYPE 2 DIABETES MELLITUS WITH STAGE 3 CHRONIC KIDNEY DISEASE, WITHOUT LONG-TERM CURRENT USE OF INSULIN: ICD-10-CM

## 2023-05-17 DIAGNOSIS — E11.22 CONTROLLED TYPE 2 DIABETES MELLITUS WITH STAGE 3 CHRONIC KIDNEY DISEASE, WITHOUT LONG-TERM CURRENT USE OF INSULIN: ICD-10-CM

## 2023-05-17 DIAGNOSIS — I25.118 CORONARY ARTERY DISEASE OF NATIVE ARTERY OF NATIVE HEART WITH STABLE ANGINA PECTORIS: ICD-10-CM

## 2023-05-17 DIAGNOSIS — M81.0 AGE-RELATED OSTEOPOROSIS WITHOUT CURRENT PATHOLOGICAL FRACTURE: Primary | ICD-10-CM

## 2023-05-17 DIAGNOSIS — D50.8 OTHER IRON DEFICIENCY ANEMIA: ICD-10-CM

## 2023-05-17 DIAGNOSIS — E87.1 HYPONATREMIA: ICD-10-CM

## 2023-05-17 DIAGNOSIS — I10 PRIMARY HYPERTENSION: ICD-10-CM

## 2023-05-17 DIAGNOSIS — Z86.73 HISTORY OF STROKE: ICD-10-CM

## 2023-05-17 DIAGNOSIS — I15.2 HYPERTENSION ASSOCIATED WITH DIABETES: ICD-10-CM

## 2023-05-17 DIAGNOSIS — Z86.73 HX OF COMPLETED STROKE: ICD-10-CM

## 2023-05-17 DIAGNOSIS — R42 VERTIGO: ICD-10-CM

## 2023-05-17 DIAGNOSIS — I99.8 FLUCTUATING BLOOD PRESSURE: ICD-10-CM

## 2023-05-17 DIAGNOSIS — R00.2 PALPITATIONS: ICD-10-CM

## 2023-05-17 DIAGNOSIS — E11.59 HYPERTENSION ASSOCIATED WITH DIABETES: ICD-10-CM

## 2023-05-17 DIAGNOSIS — E78.2 MIXED HYPERLIPIDEMIA: ICD-10-CM

## 2023-05-17 DIAGNOSIS — I10 ESSENTIAL HYPERTENSION: Primary | ICD-10-CM

## 2023-05-17 DIAGNOSIS — R42 DIZZINESS: ICD-10-CM

## 2023-05-17 PROCEDURE — 3075F PR MOST RECENT SYSTOLIC BLOOD PRESS GE 130-139MM HG: ICD-10-PCS | Mod: CPTII,,, | Performed by: INTERNAL MEDICINE

## 2023-05-17 PROCEDURE — 1160F RVW MEDS BY RX/DR IN RCRD: CPT | Mod: CPTII,,, | Performed by: INTERNAL MEDICINE

## 2023-05-17 PROCEDURE — 96372 THER/PROPH/DIAG INJ SC/IM: CPT

## 2023-05-17 PROCEDURE — 1100F PTFALLS ASSESS-DOCD GE2>/YR: CPT | Mod: CPTII,,, | Performed by: INTERNAL MEDICINE

## 2023-05-17 PROCEDURE — 99215 PR OFFICE/OUTPT VISIT, EST, LEVL V, 40-54 MIN: ICD-10-PCS | Mod: ,,, | Performed by: INTERNAL MEDICINE

## 2023-05-17 PROCEDURE — 3075F SYST BP GE 130 - 139MM HG: CPT | Mod: CPTII,,, | Performed by: INTERNAL MEDICINE

## 2023-05-17 PROCEDURE — 63600175 PHARM REV CODE 636 W HCPCS: Mod: JZ,JG | Performed by: INTERNAL MEDICINE

## 2023-05-17 PROCEDURE — 99999 PR PBB SHADOW E&M-EST. PATIENT-LVL IV: CPT | Mod: PBBFAC,,, | Performed by: INTERNAL MEDICINE

## 2023-05-17 PROCEDURE — 1100F PR PT FALLS ASSESS DOC 2+ FALLS/FALL W/INJURY/YR: ICD-10-PCS | Mod: CPTII,,, | Performed by: INTERNAL MEDICINE

## 2023-05-17 PROCEDURE — 3078F DIAST BP <80 MM HG: CPT | Mod: CPTII,,, | Performed by: INTERNAL MEDICINE

## 2023-05-17 PROCEDURE — 99999 PR PBB SHADOW E&M-EST. PATIENT-LVL IV: ICD-10-PCS | Mod: PBBFAC,,, | Performed by: INTERNAL MEDICINE

## 2023-05-17 PROCEDURE — 1159F PR MEDICATION LIST DOCUMENTED IN MEDICAL RECORD: ICD-10-PCS | Mod: CPTII,,, | Performed by: INTERNAL MEDICINE

## 2023-05-17 PROCEDURE — 3078F PR MOST RECENT DIASTOLIC BLOOD PRESSURE < 80 MM HG: ICD-10-PCS | Mod: CPTII,,, | Performed by: INTERNAL MEDICINE

## 2023-05-17 PROCEDURE — 99215 OFFICE O/P EST HI 40 MIN: CPT | Mod: ,,, | Performed by: INTERNAL MEDICINE

## 2023-05-17 PROCEDURE — 1160F PR REVIEW ALL MEDS BY PRESCRIBER/CLIN PHARMACIST DOCUMENTED: ICD-10-PCS | Mod: CPTII,,, | Performed by: INTERNAL MEDICINE

## 2023-05-17 PROCEDURE — 1159F MED LIST DOCD IN RCRD: CPT | Mod: CPTII,,, | Performed by: INTERNAL MEDICINE

## 2023-05-17 PROCEDURE — 3288F PR FALLS RISK ASSESSMENT DOCUMENTED: ICD-10-PCS | Mod: CPTII,,, | Performed by: INTERNAL MEDICINE

## 2023-05-17 PROCEDURE — 3288F FALL RISK ASSESSMENT DOCD: CPT | Mod: CPTII,,, | Performed by: INTERNAL MEDICINE

## 2023-05-17 RX ORDER — FUROSEMIDE 20 MG/1
20 TABLET ORAL DAILY PRN
Qty: 60 TABLET | Refills: 3 | Status: SHIPPED | OUTPATIENT
Start: 2023-05-17 | End: 2023-07-12 | Stop reason: ALTCHOICE

## 2023-05-17 RX ORDER — ISOSORBIDE MONONITRATE 30 MG/1
30 TABLET, EXTENDED RELEASE ORAL NIGHTLY
Qty: 30 TABLET | Refills: 3 | Status: SHIPPED | OUTPATIENT
Start: 2023-05-17 | End: 2023-06-12

## 2023-05-17 RX ADMIN — DENOSUMAB 60 MG: 60 INJECTION SUBCUTANEOUS at 09:05

## 2023-05-17 NOTE — NURSING
"BP elevated, contacted Dr. Hernandez. Gave the "ok to treat order". Patient stated she just saw cardiologist who made some HTN med changes today.     .Injection given without difficulties.Bandaid applied. Patient instructed to stay in the clinic for 15 minutes. Patient verbalized understanding and will notify nurse with any complaints. RTC 6 months    "

## 2023-05-17 NOTE — DISCHARGE INSTRUCTIONS
.Remember to take your calcium with vitamin D supplement as directed.   Do not have any dental work for 3 months following your injection today.     .[facility]  [address] [facility].  or  [id number] [place] [address] Drive  [phone] phone     [phone] fax  Hours of Operation: Monday- Friday 8:00am- 5:00pm  After hours phone  [phone]  Hematology / On[facility] Physicians on call    Dr. Nate Matias      Nurse Practitioners:    Krystin Long, MC Darby, MC Veras, MC Talamantes, NP  Vicenta Harden, NP  Pham Martinez, PA      Please don't hesitate to call if you have any concerns.      FALL PREVENTION   Falls often occur due to slipping, tripping or losing your balance. Here are ways to reduce your risk of falling again.   Was there anything that caused your fall that can be fixed, removed or replaced?   Make your home safe by keeping walkways clear of objects you may trip over.   Use non-slip pads under rugs.   Do not walk in poorly lit areas.   Do not stand on chairs or wobbly ladders.   Use caution when reaching overhead or looking upward. This position can cause a loss of balance.   Be sure your shoes fit properly, have non-slip bottoms and are in good condition.   Be cautious when going up and down stairs, curbs, and when walking on uneven sidewalks.   If your balance is poor, consider using a cane or walker.   If your fall was related to alcohol use, stop or limit alcohol intake.   If your fall was related to use of sleeping medicines, talk to your doctor about this. You may need to reduce your dosage at bedtime if you awaken during the night to go to the bathroom.   To reduce the need for nighttime bathroom trips:   Avoid drinking fluids for several hours before going to bed   Empty your bladder before going to bed   Men can keep a urinal at the bedside   © 3490-4403 [employer], [address] [place] [address]  [address]. All rights reserved. This information is not intended as a substitute for professional medical care. Always follow your healthcare professional's instructions.    WAYS TO HELP PREVENT INFECTION        WASH YOUR HANDS OFTEN DURING THE DAY, ESPECIALLY BEFORE YOU EAT, AFTER USING THE BATHROOM, AND AFTER TOUCHING ANIMALS    STAY AWAY FROM PEOPLE WHO HAVE ILLNESSES YOU CAN CATCH; SUCH AS COLDS, FLU, CHICKEN POX    TRY TO AVOID CROWDS    STAY AWAY FROM CHILDREN WHO RECENTLY HAVE RECEIVED LIVE VIRUS VACCINES    MAINTAIN GOOD MOUTH CARE    DO NOT SQUEEZE OR SCRATCH PIMPLES    CLEAN CUTS & SCRAPES RIGHT AWAY AND DAILY UNTIL HEALED WITH WARM WATER, SOAP & AN ANTISEPTIC    AVOID CONTACT WITH LITTER BOXES, BIRD CAGES, & FISH TANKS    AVOID STANDING WATER, IE., BIRD BATHS, FLOWER POTS/VASES, OR HUMIDIFIERS    WEAR GLOVES WHEN GARDENING OR CLEANING UP AFTER OTHERS, ESPECIALLY BABIES & SMALL CHILDREN    DO NOT EAT RAW FISH, SEAFOOD, MEAT, OR EGGS

## 2023-05-17 NOTE — Clinical Note
Hey how are you doing? I just saw her this this AM hasn't been here for almost a year but a lot of going on. I am referring her for a bunch and will get some labs/cardiac testing. I told her to follow up with you soon as well for some of her her noncardiac symptoms. Thanks   - PM

## 2023-05-17 NOTE — PROGRESS NOTES
Subjective:   Patient ID:  Shireen Felix is a 77 y.o. female who presents for cardiac consult of No chief complaint on file.      The patient came in today for cardiac consult of No chief complaint on file.      Sihreen Felix is a 77 y.o. female pt with HFPEF, CAD, CVI, prior CVA, COPD, seizure disorder, DM type II, HTN, and ANDREI here for follow up CV eval.     9/12/19  She had a fall last month was admitted to [facility] [place] and had cardiac eval for CP with min elevated trop 1st set, ECHO normal, pain thought due to GI etiology.   She has been doing a lot of strenous activity and has been getting more SOB and CP with it. CP feels like pressure, worse with being more tired.   Had recent pulm eval had nodule, had overall stable lung capacity.   Prior CV - Dr. Garcias    1/23/20  Nuclear stress neg for ischemia. Holter neg. Added Dilt last visit. She has been going through PT for legs back but now pain is worse and will need to get Xrays. She also had cellulitis of hands/arms was on IV abx. Gets more tired/SOB more lately.     5/19/21  Follow up since 1/2020. She had some pulm issues as well as back pain. NO hospitalizations or surgeries. Had COVID vaccines. She has been having palpitations along with  Chest pain towards throat/back, feels like angina.   ECG - NSR, LAD, septal infarct,      7/28/21  BP and HR well controlled today. She had a death in family had to resched the cardiac testing. She continues to have SOB with palpitations. Feels full and pressure. She has not been to neuro yet.     9/10/21  Told pt - Holter revealed elevated heart rates with occasional PVCS and fast rhythms - increase metoprolol to 50mg twice a day and continue Diltiazem 120mg, monitor Bp and heart rates, follow up with me in 2-3 weeks.  Nuclear stress and ECHO neg.  She had recent sinus infection she has been fighting. BP today stable. Had more edema at times.     6/2/22  She had COVID 19 twice so far, had infusion therapy. Had labile BP  and HR. She has more sinus issues and has been seeing ENT as well. Has more palpitations.   ECG - NSR, LAD, LBBB    5/17/23  Follow up since 6/2022. She had falls lately and had XANDER with Dr. Oshea. BP has been elevated 189/121.     ER eval  Medical Decision Making  77-year-old female presented to ER with concerns for hypertension. Reports has been taking her medication as directed however she has not mentioned to her PCP that her blood pressures been elevated. Peak pressure yesterday 200/100. Today's peak pressure was 190/80 per patient. She denies any symptoms concerning her blood pressure especially no nausea, vomiting, fever, headache, chest pain, shortness of breath, weakness, dizziness, blurry vision. Advised patient that her sodium level was a little low and she should speak to her PCP concerning this. Advised her that her BNP was little elevated she should discuss this with her PCP.     Ref Range & Units 4 d ago   BNP(Brain Natriuretic Peptide) 15 - 266 PG/ High       She has been taking HCTZ daily for last few months. She has been having more SOB as well. She had COVID 6 times. She has hyponatremia on recent labs at [facility], was told to increase her sodium but she also has increased BNP concern for worsening CHF.. She has ongoing dizziness, no recent ENT or Neuro eval.    Patient feels no PND, no syncope, no CNS symptoms.    Patient is compliant with medications.    ECG - sinus tach V rate 124    HOLTER  Sinus rhythm with heart rates varying between 75 and 136 BPM with an average of 95 BPM.     Maximum heart rate recorded at: 13:43 CDT on day 2.     Minimum heart rate recorded at 02:25 CDT on day 1.   PVC    Ventricular Arrhythmias  There were rare PVCs totalling 510 and averaging 5.38 per hour. There were 6 couplets. There were 9 triplets.   PAC    Supraventricular Arrhythmias  There were very rare PACs totalling 37 and averaging 0.39 per hour.         Results for orders placed during the hospital  encounter of 08/17/21    Echo Color Flow Doppler? Yes    Interpretation Summary  · The left ventricle is normal in size with concentric hypertrophy and normal systolic function.  · The estimated ejection fraction is 60%.  · Normal left ventricular diastolic function.  · With normal right ventricular systolic function.    Results for orders placed during the hospital encounter of 08/17/21    Nuclear Stress - Cardiology Interpreted    Interpretation Summary    Normal myocardial perfusion scan. There is no evidence of myocardial ischemia or infarction.    The gated perfusion images showed an ejection fraction of 69% at rest. The gated perfusion images showed an ejection fraction of 58% post stress.    There is normal wall motion at rest and post stress.    LV cavity size is normal at rest and normal at stress.    The EKG portion of this study is negative for ischemia.    The patient reported no chest pain during the stress test.    During stress, rare PVCs are noted.      Past Medical History:   Diagnosis Date    Abnormal ECG 8/5/2019    Aneurysm     Anticoagulant long-term use     Arthritis     CAD in native artery 8/5/2019    COPD (chronic obstructive pulmonary disease)     Diabetes mellitus     Fall 10/10/2019    Formatting of this note might be different from the original. Found sitting on floor next to bed last night Mild confusion today Does not recall falling or how she ended up on floor UA today Labs yesterday unremarkable    Glaucoma     Hypertension     Seizures     Shingles 05/27/2017    Stroke        Past Surgical History:   Procedure Laterality Date    BRAIN SURGERY      CARDIAC CATHETERIZATION      CORONARY ANGIOPLASTY      EPIDURAL STEROID INJECTION INTO LUMBAR SPINE Bilateral 11/12/2019    Procedure: TF XANDER L4/5;  Surgeon: Desean Dias MD;  Location: [facility];  Service: Pain Management;  Laterality: Bilateral;    HYSTERECTOMY      INJECTION OF ANESTHETIC AGENT AROUND MEDIAL BRANCH NERVES  INNERVATING LUMBAR FACET JOINT Bilateral 2020    Procedure: Bilateral L3-5 MBB;  Surgeon: Desean Dias MD;  [facility];  Service: Pain Management;  Laterality: Bilateral;    INJECTION OF ANESTHETIC AGENT INTO SACROILIAC JOINT Right 2021    Procedure: Right BLOCK, SACROILIAC JOINT Right GTB with RN IV sedation;  Surgeon: Yao Fulton MD;  [facility];  Service: Pain Management;  Laterality: Right;    INJECTION OF JOINT Bilateral 2020    Procedure: Bilateral shoulder GH Joint injection with local;  Surgeon: Desean Dias MD;  Location: [facility];  Service: Pain Management;  Laterality: Bilateral;    SELECTIVE INJECTION OF ANESTHETIC AGENT AROUND LUMBAR SPINAL NERVE ROOT BY TRANSFORAMINAL APPROACH Bilateral 2023    Procedure: Bilateral L4/5 TF XANDER RN IV Sedation;  Surgeon: Francisco Oshea MD;  [facility];  Service: Pain Management;  Laterality: Bilateral;    TRANSFORAMINAL EPIDURAL INJECTION OF STEROID Bilateral 2019    Procedure: Bilateral L3/4 Transforaminal Epidural Steroid Injection;  Surgeon: Desean Dias MD;  Location: [facility];  Service: Pain Management;  Laterality: Bilateral;    TRANSFORAMINAL EPIDURAL INJECTION OF STEROID Bilateral 3/10/2020    Procedure: Right T12/L1 TF XANDER with local;  Surgeon: Desean Dias MD;  [facility];  Service: Pain Management;  Laterality: Bilateral;    TRANSFORAMINAL EPIDURAL INJECTION OF STEROID Right 2020    Procedure: Right T12/L1 TFESI Covid day of procedure;  Surgeon: Desean Dias MD;  [facility];  Service: Pain Management;  Laterality: Right;       Social History     Tobacco Use    Smoking status: Former     Packs/day: 1.00     Years: 42.00     Pack years: 42.00     Types: Cigarettes     Start date:      Quit date: 2004     Years since quittin.1    Smokeless tobacco: Never   Substance Use Topics    Alcohol use: No    Drug use: Never        Family History   Problem Relation Age of Onset    No Known Problems Mother     No Known Problems Father        Patient's Medications   New Prescriptions    FUROSEMIDE (LASIX) 20 MG TABLET    Take 1 tablet (20 mg total) by mouth daily as needed (Take next 3 days for more fluid removal from CHF). Take next 3 days for more fluid removal from CHF    ISOSORBIDE MONONITRATE (IMDUR) 30 MG 24 HR TABLET    Take 1 tablet (30 mg total) by mouth every evening.   Previous Medications    ALBUTEROL (PROVENTIL) 2.5 MG /3 ML (0.083 %) NEBULIZER SOLUTION    Take 2.5 mg by nebulization every 6 (six) hours as needed for Wheezing. Rescue    ALBUTEROL (PROVENTIL/VENTOLIN HFA) 90 MCG/ACTUATION INHALER    INHALE TWO PUFFS INTO THE LUNGS EVERY 4 HOURS AS NEEDED    AMLODIPINE (NORVASC) 10 MG TABLET    TAKE 1 TABLET BY MOUTH ONCE A DAY    APTIOM 800 MG TAB    TAKE 1 TABLET BY MOUTH EVERY EVENING WITH 400MG TABLET    ATORVASTATIN (LIPITOR) 40 MG TABLET    TAKE 1 TABLET BY MOUTH ONCE DAILY    CHOLECALCIFEROL, VITAMIN D3, 1,250 MCG (50,000 UNIT) CAPSULE    Take 1 capsule (50,000 Units total) by mouth every 7 days.    CLOPIDOGREL (PLAVIX) 75 MG TABLET    Take 1 tablet (75 mg total) by mouth once daily.    DALIRESP 500 MCG TAB    TAKE 1 TABLET BY MOUTH ONCE DAILY    DENOSUMAB (PROLIA) 60 MG/ML SYRG    every 6 (six) months.    DICLOFENAC (VOLTAREN) 25 MG TBEC    TAKE 1 TABLET BY MOUTH TWICE DAILY    DILTIAZEM (DILACOR XR) 120 MG CDCR    Take 1 capsule (120 mg total) by mouth once daily.    DORZOLAMIDE-TIMOLOL 2-0.5% (COSOPT) 22.3-6.8 MG/ML OPHTHALMIC SOLUTION    Place 1 drop into both eyes every 12 (twelve) hours.    ESCITALOPRAM OXALATE (LEXAPRO) 20 MG TABLET    TAKE 1 TABLET BY MOUTH ONCE DAILY    ESLICARBAZEPINE (APTIOM) 400 MG TAB TABLET    TAKE 1 TABLET BY MOUTH EVERY EVENING WITH 800 MG TABLET  Strength: 400 mg    FLUTICASONE PROPIONATE (FLONASE) 50 MCG/ACTUATION NASAL SPRAY    USE 2 SPRAYS INTO EACH NOSTRIL ONCE A DAY AT 6AM AS  DIRECTED    FLUTICASONE-UMECLIDIN-VILANTER (TRELEGY ELLIPTA) 200-62.5-25 MCG INHALER    INHALE 1 PUFF BY MOUTH ONCE A DAY    GABAPENTIN (NEURONTIN) 100 MG CAPSULE    Take 100 mg by mouth 2 (two) times daily as needed.    HYDROCHLOROTHIAZIDE (HYDRODIURIL) 25 MG TABLET    TAKE 1 TABLET BY MOUTH ONCE DAILY AS NEEDED    IPRATROPIUM (ATROVENT) 42 MCG (0.06 %) NASAL SPRAY    USE 2 SPRAYS INTO EACH NOSTRIL FOUR TIMES DAILY    KETOCONAZOLE (NIZORAL) 2 % CREAM    Apply topically 2 (two) times daily. For dry flaky patches of ear.    LATANOPROST 0.005 % OPHTHALMIC SOLUTION    Place 1 drop into both eyes every evening.    LEVOCETIRIZINE (XYZAL) 5 MG TABLET    Take 1 tablet (5 mg total) by mouth every evening.    MAGNESIUM OXIDE (MAG-OX) 400 MG (241.3 MG MAGNESIUM) TABLET    Take 1 tablet (400 mg total) by mouth once daily.    METFORMIN (GLUCOPHAGE) 500 MG TABLET    TAKE 1 TABLET BY MOUTH ONCE DAILY    METHYLPREDNISOLONE (MEDROL DOSEPACK) 4 MG TABLET    use as directed    METOPROLOL TARTRATE (LOPRESSOR) 100 MG TABLET    Take 1 tablet (100 mg total) by mouth 2 (two) times daily.    MONTELUKAST (SINGULAIR) 10 MG TABLET    TAKE 1 TABLET BY MOUTH ONCE A DAY    MYRBETRIQ 50 MG TB24    TAKE 1 TABLET BY MOUTH ONCE DAILY    NITROGLYCERIN (NITROSTAT) 0.4 MG SL TABLET    DISSOLVE 1 TABLET UNDER TONGUE EVERY 5 MINUTES FOR CHEST PAIN (MAY TAKE UP TO 3 DOSES THEN SEEK MEDICAL ATTENTION)    NORTRIPTYLINE (PAMELOR) 25 MG CAPSULE    Take 1 capsule (25 mg total) by mouth every evening.    OMEPRAZOLE (PRILOSEC) 40 MG CAPSULE    Take 40 mg by mouth every morning.    PANTOPRAZOLE (PROTONIX) 40 MG TABLET    Take 1 tablet (40 mg total) by mouth 2 (two) times daily.    POTASSIUM CHLORIDE SA (K-DUR,KLOR-CON) 20 MEQ TABLET    Take 1 tablet (20 mEq total) by mouth once daily.    RANOLAZINE (RANEXA) 1,000 MG TB12    Take 1 tablet (1,000 mg total) by mouth 2 (two) times daily.    TIZANIDINE (ZANAFLEX) 4 MG TABLET    Take 1-1.5 tablets (4-6 mg total) by  mouth 2 (two) times daily as needed (muscle spasms). May cause drowsiness.    TRIAMCINOLONE ACETONIDE 0.025% (KENALOG) 0.025 % CREAM    Apply topically 2 (two) times daily. PRN rash and itching of ear. Mild steroid cream.    VALSARTAN (DIOVAN) 320 MG TABLET    Take 1 tablet (320 mg total) by mouth once daily.   Modified Medications    No medications on file   Discontinued Medications    No medications on file       Review of Systems   Constitutional:  Positive for malaise/fatigue.   HENT: Negative.     Eyes: Negative.    Respiratory:  Positive for cough and shortness of breath.    Cardiovascular:  Positive for chest pain and palpitations.   Gastrointestinal: Negative.    Genitourinary: Negative.    Musculoskeletal: Negative.    Skin: Negative.    Neurological:  Positive for dizziness and loss of consciousness.   Endo/Heme/Allergies: Negative.    Psychiatric/Behavioral: Negative.     All 12 systems otherwise negative.    Wt Readings from Last 3 Encounters:   05/17/23 56.1 kg (123 lb 10.9 oz)   05/16/23 56.4 kg (124 lb 5.4 oz)   04/26/23 56.4 kg (124 lb 5.4 oz)     Temp Readings from Last 3 Encounters:   04/26/23 98 °F (36.7 °C) (Temporal)   03/03/23 97.3 °F (36.3 °C) (Tympanic)   11/17/22 97.7 °F (36.5 °C)     BP Readings from Last 3 Encounters:   05/17/23 132/78   05/16/23 (!) 166/80   05/16/23 (!) 146/73     Pulse Readings from Last 3 Encounters:   05/17/23 70   05/16/23 68   05/16/23 69       /78 (BP Location: Left arm, Patient Position: Sitting)   Pulse 70   Wt 56.1 kg (123 lb 10.9 oz)   LMP  (LMP Unknown)   SpO2 98%   BMI 21.23 kg/m²     Objective:   Physical Exam  Vitals and nursing note reviewed.   Constitutional:       General: She is not in acute distress.     Appearance: She is well-developed. She is not diaphoretic.   HENT:      Head: Normocephalic and atraumatic.      Nose: Nose normal.   Eyes:      General: No scleral icterus.     Conjunctiva/sclera: Conjunctivae normal.   Neck:      Thyroid:  No thyromegaly.      Vascular: No JVD.   Cardiovascular:      Rate and Rhythm: Normal rate and regular rhythm.      Heart sounds: S1 normal and S2 normal. No murmur heard.    No friction rub. No gallop. No S3 or S4 sounds.   Pulmonary:      Effort: Pulmonary effort is normal. No respiratory distress.      Breath sounds: Normal breath sounds. No stridor. No wheezing or rales.   Chest:      Chest wall: No tenderness.   Abdominal:      General: Bowel sounds are normal. There is no distension.      Palpations: Abdomen is soft. There is no mass.      Tenderness: There is no abdominal tenderness. There is no rebound.   Genitourinary:     Comments: Deferred  Musculoskeletal:         General: No tenderness or deformity. Normal range of motion.      Cervical back: Normal range of motion and neck supple.   Lymphadenopathy:      Cervical: No cervical adenopathy.   Skin:     General: Skin is warm and dry.      Coloration: Skin is not pale.      Findings: No erythema or rash.   Neurological:      Mental Status: She is alert and oriented to person, place, and time.      Motor: No abnormal muscle tone.      Coordination: Coordination normal.   Psychiatric:         Behavior: Behavior normal.         Thought Content: Thought content normal.         Judgment: Judgment normal.       Lab Results   Component Value Date     (L) 11/17/2022    K 3.6 11/17/2022     11/17/2022    CO2 22 (L) 11/17/2022    BUN 15 11/17/2022    CREATININE 0.8 11/17/2022     (H) 11/17/2022    HGBA1C 5.1 10/26/2022    MG 1.4 (L) 06/02/2022    AST 18 11/17/2022    ALT 13 11/17/2022    ALBUMIN 3.4 (L) 11/17/2022    PROT 7.5 11/17/2022    BILITOT 0.4 11/17/2022    WBC 10.45 01/06/2022    HGB 12.1 01/06/2022    HCT 37.0 01/06/2022    MCV 95 01/06/2022     01/06/2022    INR 1.0 11/05/2020    TSH 1.249 08/09/2021    CHOL 159 10/26/2022    HDL 65 10/26/2022    LDLCALC 79.8 10/26/2022    TRIG 71 10/26/2022     (H) 06/02/2022      Assessment:      1. Essential hypertension    2. Palpitations    3. Coronary artery disease of native artery of native heart with stable angina pectoris    4. Chronic diastolic heart failure    5. Controlled type 2 diabetes mellitus with stage 3 chronic kidney disease, without long-term current use of insulin    6. Hypertension associated with diabetes    7. Primary hypertension    8. Mixed hyperlipidemia    9. Fluctuating blood pressure    10. History of stroke    11. Hx of completed stroke    12. Hyponatremia    13. Dizziness    14. Fall, sequela    15. Vertigo    16. Other iron deficiency anemia          Plan:   1. CAD with angina  - cont plavix, BB, Ranexa  - order pharm nuclear stress test and ECHO  - add Imdur   - PRN NTG    2. HTN - with occ edema, labile   - titrate meds  - HCTZ 25 mg PRN ; add lasix PRN  3 days  - rec compression stockings  - add imdur QHS     3. HFPEF with hyponatremia   - elevated BNP  - start lasix next 3 days   - repeat labs in 1 week   - refer to nephro as well - h/o hyponatremia maybe sec to volume overload vs noncardiac etiologies     4. H/o CVA/Seizures  - cont meds per neuro  - needs to f/u   - carotid u/s - normal    5. Palpitation with tachycardia  - cont Dilt   - cont BB  - f/u Dr. Darling as needed, recent Vital monitor 2/2023 with occ SVT episodes - not sustained     6. COPD, h/o COVID 19  x 6   - cont meds per PCP/pulm  - neg for MEENA     7. Anemia - fe def  - cont iron, repeat iron levels ordered  - f/u heme/onc      7. Debility with dizziness  - f/u with PT/OT  - refer to ENT and PMR     Pt has ongoing symptoms due to multiple etiologies  - needs to f/u with Neuro, ENT, PMR, Nephro, Heme/onc, Pulm, Pain management     Spent > 60 min reviewing data/discussing with pt and family at bedside and made several referrals     Thank you for allowing me to participate in this patient's care. Please do not hesitate to contact me with any questions or concerns. Consult note has been  forwarded to the referral physician.

## 2023-05-19 ENCOUNTER — TELEPHONE (OUTPATIENT)
Dept: NEUROLOGY | Facility: CLINIC | Age: 78
End: 2023-05-19
Payer: MEDICARE

## 2023-05-19 ENCOUNTER — TELEPHONE (OUTPATIENT)
Dept: CARDIOLOGY | Facility: HOSPITAL | Age: 78
End: 2023-05-19
Payer: MEDICARE

## 2023-05-19 ENCOUNTER — TELEPHONE (OUTPATIENT)
Dept: NEPHROLOGY | Facility: CLINIC | Age: 78
End: 2023-05-19
Payer: MEDICARE

## 2023-05-19 NOTE — TELEPHONE ENCOUNTER
----- Message from Abdiel Willard sent at 5/19/2023 12:04 PM CDT -----  Contact: MARGARETTY  Patient is requesting a call back from the nurse regarding needing clarification on upcoming appt. Please eddie her back at [phone]

## 2023-05-19 NOTE — TELEPHONE ENCOUNTER
Please reach out to patient in regards in regards to scheduling Nephrology appointment.       Please thank you.

## 2023-05-19 NOTE — TELEPHONE ENCOUNTER
----- Message from Abdiel Willard sent at 5/19/2023 11:57 AM CDT -----  Contact: JETTY  Patient is requesting a call to schedule, reports having high bp and was referred by cardiologist to check kidneys as well and wants to schedule sooner appt with nurse if possible. Please call pt back at [phone]

## 2023-05-22 ENCOUNTER — HOSPITAL ENCOUNTER (OUTPATIENT)
Dept: RADIOLOGY | Facility: HOSPITAL | Age: 78
Discharge: HOME | End: 2023-05-22
Attending: PSYCHIATRY & NEUROLOGY
Payer: MEDICARE

## 2023-05-22 ENCOUNTER — OFFICE VISIT (OUTPATIENT)
Dept: NEUROLOGY | Facility: CLINIC | Age: 78
End: 2023-05-22
Payer: MEDICARE

## 2023-05-22 VITALS
BODY MASS INDEX: 21.23 KG/M2 | DIASTOLIC BLOOD PRESSURE: 78 MMHG | SYSTOLIC BLOOD PRESSURE: 137 MMHG | HEIGHT: 64 IN | OXYGEN SATURATION: 99 % | RESPIRATION RATE: 16 BRPM | HEART RATE: 101 BPM

## 2023-05-22 DIAGNOSIS — J34.89 NASAL SEPTAL PERFORATION: ICD-10-CM

## 2023-05-22 DIAGNOSIS — G89.29 CHRONIC LOW BACK PAIN WITH SCIATICA, SCIATICA LATERALITY UNSPECIFIED, UNSPECIFIED BACK PAIN LATERALITY: ICD-10-CM

## 2023-05-22 DIAGNOSIS — J45.40 MODERATE PERSISTENT ASTHMA WITHOUT COMPLICATION: ICD-10-CM

## 2023-05-22 DIAGNOSIS — Z98.890 HX OF CRANIOTOMY: ICD-10-CM

## 2023-05-22 DIAGNOSIS — I25.118 CORONARY ARTERY DISEASE OF NATIVE ARTERY OF NATIVE HEART WITH STABLE ANGINA PECTORIS: ICD-10-CM

## 2023-05-22 DIAGNOSIS — Z79.899 POLYPHARMACY: ICD-10-CM

## 2023-05-22 DIAGNOSIS — D64.9 NORMOCYTIC ANEMIA: ICD-10-CM

## 2023-05-22 DIAGNOSIS — N28.1 BILATERAL RENAL CYSTS: ICD-10-CM

## 2023-05-22 DIAGNOSIS — R55 SYNCOPE, UNSPECIFIED SYNCOPE TYPE: ICD-10-CM

## 2023-05-22 DIAGNOSIS — R23.3 SPONTANEOUS ECCHYMOSIS: ICD-10-CM

## 2023-05-22 DIAGNOSIS — R53.83 FATIGUE, UNSPECIFIED TYPE: ICD-10-CM

## 2023-05-22 DIAGNOSIS — H66.3X9 CHRONIC SUPPURATIVE OTITIS MEDIA, UNSPECIFIED LATERALITY, UNSPECIFIED OTITIS MEDIA LOCATION: ICD-10-CM

## 2023-05-22 DIAGNOSIS — R06.83 PRIMARY SNORING: ICD-10-CM

## 2023-05-22 DIAGNOSIS — Z98.890 HISTORY OF CEREBRAL ANEURYSM REPAIR: ICD-10-CM

## 2023-05-22 DIAGNOSIS — Z86.73 HX OF COMPLETED STROKE: ICD-10-CM

## 2023-05-22 DIAGNOSIS — F32.A DEPRESSION, UNSPECIFIED DEPRESSION TYPE: ICD-10-CM

## 2023-05-22 DIAGNOSIS — M48.061 NEUROFORAMINAL STENOSIS OF LUMBAR SPINE: ICD-10-CM

## 2023-05-22 DIAGNOSIS — R42 DIZZINESS AND GIDDINESS: ICD-10-CM

## 2023-05-22 DIAGNOSIS — M19.019 SHOULDER ARTHRITIS: ICD-10-CM

## 2023-05-22 DIAGNOSIS — M54.16 LUMBAR RADICULOPATHY, CHRONIC: ICD-10-CM

## 2023-05-22 DIAGNOSIS — Z86.79 HISTORY OF CEREBRAL ANEURYSM REPAIR: ICD-10-CM

## 2023-05-22 DIAGNOSIS — R53.81 DEBILITY: ICD-10-CM

## 2023-05-22 DIAGNOSIS — R76.8 ELEVATED IGE LEVEL: ICD-10-CM

## 2023-05-22 DIAGNOSIS — K59.09 OTHER CONSTIPATION: ICD-10-CM

## 2023-05-22 DIAGNOSIS — B02.29 POSTHERPETIC NEURALGIA: ICD-10-CM

## 2023-05-22 DIAGNOSIS — M54.12 CERVICAL RADICULOPATHY: ICD-10-CM

## 2023-05-22 DIAGNOSIS — I95.1 ORTHOSTATIC HYPOTENSION: ICD-10-CM

## 2023-05-22 DIAGNOSIS — R42 VERTIGO: ICD-10-CM

## 2023-05-22 DIAGNOSIS — E11.65 TYPE 2 DIABETES MELLITUS WITH HYPERGLYCEMIA, WITH LONG-TERM CURRENT USE OF INSULIN: ICD-10-CM

## 2023-05-22 DIAGNOSIS — J30.9 CHRONIC ALLERGIC RHINITIS: ICD-10-CM

## 2023-05-22 DIAGNOSIS — G89.4 CHRONIC PAIN SYNDROME: ICD-10-CM

## 2023-05-22 DIAGNOSIS — N18.30 CONTROLLED TYPE 2 DIABETES MELLITUS WITH STAGE 3 CHRONIC KIDNEY DISEASE, WITHOUT LONG-TERM CURRENT USE OF INSULIN: ICD-10-CM

## 2023-05-22 DIAGNOSIS — M81.0 AGE-RELATED OSTEOPOROSIS WITHOUT CURRENT PATHOLOGICAL FRACTURE: ICD-10-CM

## 2023-05-22 DIAGNOSIS — M46.1 SACROILIITIS, NOT ELSEWHERE CLASSIFIED: ICD-10-CM

## 2023-05-22 DIAGNOSIS — H90.2 CONDUCTIVE HEARING LOSS, UNILATERAL: ICD-10-CM

## 2023-05-22 DIAGNOSIS — H72.91 PERFORATION OF RIGHT TYMPANIC MEMBRANE: ICD-10-CM

## 2023-05-22 DIAGNOSIS — M54.40 CHRONIC LOW BACK PAIN WITH SCIATICA, SCIATICA LATERALITY UNSPECIFIED, UNSPECIFIED BACK PAIN LATERALITY: ICD-10-CM

## 2023-05-22 DIAGNOSIS — D50.8 OTHER IRON DEFICIENCY ANEMIA: ICD-10-CM

## 2023-05-22 DIAGNOSIS — I99.8 FLUCTUATING BLOOD PRESSURE: ICD-10-CM

## 2023-05-22 DIAGNOSIS — Z87.891 HISTORY OF TOBACCO ABUSE: ICD-10-CM

## 2023-05-22 DIAGNOSIS — M51.35 DDD (DEGENERATIVE DISC DISEASE), THORACOLUMBAR: ICD-10-CM

## 2023-05-22 DIAGNOSIS — M51.36 DDD (DEGENERATIVE DISC DISEASE), LUMBAR: ICD-10-CM

## 2023-05-22 DIAGNOSIS — R29.90 MULTIPLE NEUROLOGICAL SYMPTOMS: Primary | ICD-10-CM

## 2023-05-22 DIAGNOSIS — M48.062 SPINAL STENOSIS OF LUMBAR REGION WITH NEUROGENIC CLAUDICATION: ICD-10-CM

## 2023-05-22 DIAGNOSIS — R91.8 MULTIPLE PULMONARY NODULES: ICD-10-CM

## 2023-05-22 DIAGNOSIS — M19.032 PRIMARY OSTEOARTHRITIS OF LEFT WRIST: ICD-10-CM

## 2023-05-22 DIAGNOSIS — G40.109 TEMPORAL LOBE EPILEPSY: ICD-10-CM

## 2023-05-22 DIAGNOSIS — E44.1 MILD PROTEIN-CALORIE MALNUTRITION: ICD-10-CM

## 2023-05-22 DIAGNOSIS — M50.30 DDD (DEGENERATIVE DISC DISEASE), CERVICAL: ICD-10-CM

## 2023-05-22 DIAGNOSIS — J84.10 PULMONARY INTERSTITIAL FIBROSIS: ICD-10-CM

## 2023-05-22 DIAGNOSIS — E55.9 VITAMIN D DEFICIENCY: ICD-10-CM

## 2023-05-22 DIAGNOSIS — K21.9 GASTROESOPHAGEAL REFLUX DISEASE, UNSPECIFIED WHETHER ESOPHAGITIS PRESENT: ICD-10-CM

## 2023-05-22 DIAGNOSIS — Z86.79 HISTORY OF CEREBRAL ANEURYSM: ICD-10-CM

## 2023-05-22 DIAGNOSIS — R26.89 IMBALANCE: ICD-10-CM

## 2023-05-22 DIAGNOSIS — G56.03 BILATERAL CARPAL TUNNEL SYNDROME: ICD-10-CM

## 2023-05-22 DIAGNOSIS — Z79.4 LONG TERM CURRENT USE OF INSULIN: ICD-10-CM

## 2023-05-22 DIAGNOSIS — E11.22 CONTROLLED TYPE 2 DIABETES MELLITUS WITH STAGE 3 CHRONIC KIDNEY DISEASE, WITHOUT LONG-TERM CURRENT USE OF INSULIN: ICD-10-CM

## 2023-05-22 DIAGNOSIS — F33.1 MODERATE RECURRENT MAJOR DEPRESSION: ICD-10-CM

## 2023-05-22 DIAGNOSIS — G40.109 TEMPORAL LOBE SEIZURE: ICD-10-CM

## 2023-05-22 DIAGNOSIS — G56.21 CUBITAL TUNNEL SYNDROME ON RIGHT: ICD-10-CM

## 2023-05-22 DIAGNOSIS — R29.6 RECURRENT FALLS: ICD-10-CM

## 2023-05-22 DIAGNOSIS — J41.1 MUCOPURULENT CHRONIC BRONCHITIS: Chronic | ICD-10-CM

## 2023-05-22 DIAGNOSIS — I74.9 EMBOLISM AND THROMBOSIS OF UNSPECIFIED ARTERY: ICD-10-CM

## 2023-05-22 DIAGNOSIS — I50.32 CHRONIC DIASTOLIC HEART FAILURE: ICD-10-CM

## 2023-05-22 DIAGNOSIS — M54.16 LUMBAR RADICULOPATHY: ICD-10-CM

## 2023-05-22 DIAGNOSIS — M51.34 DDD (DEGENERATIVE DISC DISEASE), THORACIC: ICD-10-CM

## 2023-05-22 DIAGNOSIS — F51.01 PRIMARY INSOMNIA: ICD-10-CM

## 2023-05-22 DIAGNOSIS — Z79.4 TYPE 2 DIABETES MELLITUS WITH HYPERGLYCEMIA, WITH LONG-TERM CURRENT USE OF INSULIN: ICD-10-CM

## 2023-05-22 DIAGNOSIS — R94.4 DECREASED GFR: ICD-10-CM

## 2023-05-22 DIAGNOSIS — I10 PRIMARY HYPERTENSION: ICD-10-CM

## 2023-05-22 DIAGNOSIS — N32.81 OVERACTIVE BLADDER: ICD-10-CM

## 2023-05-22 DIAGNOSIS — M48.00 SPINAL STENOSIS, UNSPECIFIED SPINAL REGION: ICD-10-CM

## 2023-05-22 DIAGNOSIS — Z86.73 HISTORY OF STROKE: ICD-10-CM

## 2023-05-22 DIAGNOSIS — E87.1 HYPONATREMIA: ICD-10-CM

## 2023-05-22 DIAGNOSIS — M51.37 DDD (DEGENERATIVE DISC DISEASE), LUMBOSACRAL: ICD-10-CM

## 2023-05-22 DIAGNOSIS — E78.2 MIXED HYPERLIPIDEMIA: ICD-10-CM

## 2023-05-22 PROBLEM — R93.89 ABNORMAL CHEST X-RAY: Status: RESOLVED | Noted: 2019-02-12 | Resolved: 2023-05-22

## 2023-05-22 PROBLEM — B37.0 THRUSH, ORAL: Status: RESOLVED | Noted: 2019-08-28 | Resolved: 2023-05-22

## 2023-05-22 PROCEDURE — 70450 CT HEAD/BRAIN W/O DYE: CPT | Mod: TC

## 2023-05-22 PROCEDURE — 1159F PR MEDICATION LIST DOCUMENTED IN MEDICAL RECORD: ICD-10-PCS | Mod: CPTII,S$GLB,, | Performed by: PSYCHIATRY & NEUROLOGY

## 2023-05-22 PROCEDURE — 3075F SYST BP GE 130 - 139MM HG: CPT | Mod: CPTII,S$GLB,, | Performed by: PSYCHIATRY & NEUROLOGY

## 2023-05-22 PROCEDURE — 1125F AMNT PAIN NOTED PAIN PRSNT: CPT | Mod: CPTII,S$GLB,, | Performed by: PSYCHIATRY & NEUROLOGY

## 2023-05-22 PROCEDURE — 99215 PR OFFICE/OUTPT VISIT, EST, LEVL V, 40-54 MIN: ICD-10-PCS | Mod: S$GLB,,, | Performed by: PSYCHIATRY & NEUROLOGY

## 2023-05-22 PROCEDURE — 3078F PR MOST RECENT DIASTOLIC BLOOD PRESSURE < 80 MM HG: ICD-10-PCS | Mod: CPTII,S$GLB,, | Performed by: PSYCHIATRY & NEUROLOGY

## 2023-05-22 PROCEDURE — 99215 OFFICE O/P EST HI 40 MIN: CPT | Mod: S$GLB,,, | Performed by: PSYCHIATRY & NEUROLOGY

## 2023-05-22 PROCEDURE — 99999 PR PBB SHADOW E&M-EST. PATIENT-LVL V: ICD-10-PCS | Mod: PBBFAC,,, | Performed by: PSYCHIATRY & NEUROLOGY

## 2023-05-22 PROCEDURE — 1100F PTFALLS ASSESS-DOCD GE2>/YR: CPT | Mod: CPTII,S$GLB,, | Performed by: PSYCHIATRY & NEUROLOGY

## 2023-05-22 PROCEDURE — 99417 PR PROLONGED SVC, OUTPT, W/WO DIRECT PT CONTACT,  EA ADDTL 15 MIN: ICD-10-PCS | Mod: S$GLB,,, | Performed by: PSYCHIATRY & NEUROLOGY

## 2023-05-22 PROCEDURE — 1125F PR PAIN SEVERITY QUANTIFIED, PAIN PRESENT: ICD-10-PCS | Mod: CPTII,S$GLB,, | Performed by: PSYCHIATRY & NEUROLOGY

## 2023-05-22 PROCEDURE — 1100F PR PT FALLS ASSESS DOC 2+ FALLS/FALL W/INJURY/YR: ICD-10-PCS | Mod: CPTII,S$GLB,, | Performed by: PSYCHIATRY & NEUROLOGY

## 2023-05-22 PROCEDURE — 70450 CT HEAD/BRAIN W/O DYE: CPT | Mod: 26,,, | Performed by: RADIOLOGY

## 2023-05-22 PROCEDURE — 1159F MED LIST DOCD IN RCRD: CPT | Mod: CPTII,S$GLB,, | Performed by: PSYCHIATRY & NEUROLOGY

## 2023-05-22 PROCEDURE — 70450 CT HEAD WITHOUT CONTRAST: ICD-10-PCS | Mod: 26,,, | Performed by: RADIOLOGY

## 2023-05-22 PROCEDURE — 99417 PROLNG OP E/M EACH 15 MIN: CPT | Mod: S$GLB,,, | Performed by: PSYCHIATRY & NEUROLOGY

## 2023-05-22 PROCEDURE — 3078F DIAST BP <80 MM HG: CPT | Mod: CPTII,S$GLB,, | Performed by: PSYCHIATRY & NEUROLOGY

## 2023-05-22 PROCEDURE — 99215 OFFICE O/P EST HI 40 MIN: CPT | Performed by: PSYCHIATRY & NEUROLOGY

## 2023-05-22 PROCEDURE — 3288F FALL RISK ASSESSMENT DOCD: CPT | Mod: CPTII,S$GLB,, | Performed by: PSYCHIATRY & NEUROLOGY

## 2023-05-22 PROCEDURE — 3288F PR FALLS RISK ASSESSMENT DOCUMENTED: ICD-10-PCS | Mod: CPTII,S$GLB,, | Performed by: PSYCHIATRY & NEUROLOGY

## 2023-05-22 PROCEDURE — 3075F PR MOST RECENT SYSTOLIC BLOOD PRESS GE 130-139MM HG: ICD-10-PCS | Mod: CPTII,S$GLB,, | Performed by: PSYCHIATRY & NEUROLOGY

## 2023-05-22 PROCEDURE — 99999 PR PBB SHADOW E&M-EST. PATIENT-LVL V: CPT | Mod: PBBFAC,,, | Performed by: PSYCHIATRY & NEUROLOGY

## 2023-05-22 RX ORDER — MIRABEGRON 50 MG/1
TABLET, FILM COATED, EXTENDED RELEASE ORAL
Qty: 30 TABLET | Refills: 0 | Status: SHIPPED | OUTPATIENT
Start: 2023-05-22 | End: 2023-06-22

## 2023-05-22 RX ORDER — LEVETIRACETAM 500 MG/1
500 TABLET ORAL 2 TIMES DAILY
Qty: 180 TABLET | Refills: 3 | Status: SHIPPED | OUTPATIENT
Start: 2023-05-22

## 2023-05-22 RX ORDER — ESLICARBAZEPINE ACETATE 800 MG/1
TABLET ORAL
Qty: 30 TABLET | Refills: 5 | OUTPATIENT
Start: 2023-05-22

## 2023-05-22 NOTE — PROGRESS NOTES
"Subjective:       Patient ID: Shireen Felix is a 77 y.o. female.    Chief Complaint: Dizziness and history of completed stroke           HPI         BACKGROUND HISTORY       The patient is here to establish care for numerous, complex chronic neurological disorders.    Had a stroke in 2000/2001 that caused LT HP. On Plavix. VRFs are stratified (HTN, HLD, T2DM, Quit Smoking).Continued Plavix and Vascular Risk Factors (VRFs  Between 7122-5953, she underwent B/L craniotomy for ruptured RT aneurysm and unruptured LT aneurysm. Ordered F/U CTA H/N.    Was started on PHT for "seizure prophylaxis" after the aneurysm and was stopped after 2 years. Around 7357-5506 she started having drop attacks with LOC for few minutes with no seizure activity. Was started on OXC and then switched to  mg QD by neurology at [facility].  She was undergoing cardiac evaluation. Stopped driving in 2017.  Ordered  EEG A/S.Changes ESL to 800 mg QD.    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 Failed PGB. She is on  mg BID and Lidocaine patches. GCA was suspected with no TORO.   Changed ESL to 800 mg QD and added neuropathic pain cream. Checked ESR.    The patient is also complaining of memory difficulties and remains independent. Ordered CTH, T4, FA,, HC, B12, MMA, RPR.      INTERVAL HISTORY       No new stroke symptoms. On Plavix and Vascular Risk Factors (VRFs).    On 10- CTA H/N Stable. No evidence of acute intracranial hemorrhage.Bilateral craniotomies with LT MCA aneurysm clip.No evidence of recurrent or residual aneurysm sac.  No other aneurysms.    09- through 10-  92 hours AEEG B/L TLE, F8-T8, F7-T7. Continues to have "seizures" on   mg QD. Hyponatremia continues to be a problem.      On 08- ESR is NL for her age and improving 80>67>58 (Unlikely GCA/TA).   mg QD helped with TN and PHN.     Memory is stable. On 10- CTA H/N Stable. No evidence of " "acute intracranial hemorrhage.Bilateral craniotomies with LT MCA aneurysm clip.No evidence of recurrent or residual aneurysm sac.  No other aneurysms. On 10- Labs NL B12-MMA, FA-HC, T4, RPR.     Snuffed from recurrent falls. Continues to have "seizures" on   mg QD. Hyponatremia continues to be a problem.  Her polypharmacy is extremely extensive. On 02- CT C/L Spine MRI  Multilevel DDD    Review of Systems   Constitutional:  Positive for fatigue. Negative for appetite change.   HENT:  Negative for hearing loss and tinnitus.    Eyes:  Negative for photophobia and visual disturbance.   Respiratory:  Positive for shortness of breath. Negative for apnea.    Cardiovascular:  Negative for chest pain and palpitations.   Gastrointestinal:  Negative for nausea and vomiting.   Endocrine: Negative for cold intolerance and heat intolerance.   Genitourinary:  Positive for urgency. Negative for difficulty urinating.   Musculoskeletal:  Positive for arthralgias, back pain, gait problem and neck pain. Negative for joint swelling, myalgias and neck stiffness.   Skin:  Negative for color change and rash.   Allergic/Immunologic: Negative for environmental allergies and immunocompromised state.   Neurological:  Positive for dizziness, seizures, syncope, weakness, light-headedness and numbness. Negative for tremors, facial asymmetry, speech difficulty and headaches.   Hematological:  Negative for adenopathy. Does not bruise/bleed easily.   Psychiatric/Behavioral:  Positive for confusion, dysphoric mood and sleep disturbance. Negative for agitation, behavioral problems, decreased concentration, hallucinations, self-injury and suicidal ideas. The patient is nervous/anxious. The patient is not hyperactive.                Current Outpatient Medications:     albuterol (PROVENTIL) 2.5 mg /3 mL (0.083 %) nebulizer solution, Take 2.5 mg by nebulization every 6 (six) hours as needed for Wheezing. Rescue, Disp: , Rfl:     " albuterol (PROVENTIL/VENTOLIN HFA) 90 mcg/actuation inhaler, INHALE TWO PUFFS INTO THE LUNGS EVERY 4 HOURS AS NEEDED, Disp: 18 g, Rfl: 11    amLODIPine (NORVASC) 10 MG tablet, TAKE 1 TABLET BY MOUTH ONCE A DAY, Disp: 90 tablet, Rfl: 3    APTIOM 800 mg Tab, TAKE 1 TABLET BY MOUTH EVERY EVENING WITH 400MG TABLET, Disp: 30 tablet, Rfl: 5    atorvastatin (LIPITOR) 40 MG tablet, TAKE 1 TABLET BY MOUTH ONCE DAILY, Disp: 90 tablet, Rfl: 1    cholecalciferol, vitamin D3, 1,250 mcg (50,000 unit) capsule, Take 1 capsule (50,000 Units total) by mouth every 7 days., Disp: 12 capsule, Rfl: 0    clopidogreL (PLAVIX) 75 mg tablet, Take 1 tablet (75 mg total) by mouth once daily., Disp: 90 tablet, Rfl: 3    DALIRESP 500 mcg Tab, TAKE 1 TABLET BY MOUTH ONCE DAILY, Disp: 90 tablet, Rfl: 3    denosumab (PROLIA) 60 mg/mL Syrg, every 6 (six) months., Disp: , Rfl:     diclofenac (VOLTAREN) 25 MG TbEC, TAKE 1 TABLET BY MOUTH TWICE DAILY, Disp: 60 tablet, Rfl: 11    diltiaZEM (DILACOR XR) 120 MG CDCR, Take 1 capsule (120 mg total) by mouth once daily., Disp: 30 capsule, Rfl: 3    dorzolamide-timolol 2-0.5% (COSOPT) 22.3-6.8 mg/mL ophthalmic solution, Place 1 drop into both eyes every 12 (twelve) hours., Disp: 10 mL, Rfl: 12    EScitalopram oxalate (LEXAPRO) 20 MG tablet, TAKE 1 TABLET BY MOUTH ONCE DAILY, Disp: 90 tablet, Rfl: 1    eslicarbazepine (APTIOM) 400 mg Tab tablet, TAKE 1 TABLET BY MOUTH EVERY EVENING WITH 800 MG TABLET Strength: 400 mg, Disp: 30 tablet, Rfl: 5    fluticasone propionate (FLONASE) 50 mcg/actuation nasal spray, USE 2 SPRAYS INTO EACH NOSTRIL ONCE A DAY AT 6AM AS DIRECTED, Disp: 16 g, Rfl: 11    fluticasone-umeclidin-vilanter (TRELEGY ELLIPTA) 200-62.5-25 mcg inhaler, INHALE 1 PUFF BY MOUTH ONCE A DAY, Disp: 60 each, Rfl: 11    furosemide (LASIX) 20 MG tablet, Take 1 tablet (20 mg total) by mouth daily as needed (Take next 3 days for more fluid removal from CHF). Take next 3 days for more fluid removal from CHF,  Disp: 60 tablet, Rfl: 3    gabapentin (NEURONTIN) 100 MG capsule, Take 100 mg by mouth 2 (two) times daily as needed., Disp: , Rfl:     hydroCHLOROthiazide (HYDRODIURIL) 25 MG tablet, TAKE 1 TABLET BY MOUTH ONCE DAILY AS NEEDED, Disp: 90 tablet, Rfl: 3    ipratropium (ATROVENT) 42 mcg (0.06 %) nasal spray, USE 2 SPRAYS INTO EACH NOSTRIL FOUR TIMES DAILY, Disp: 15 mL, Rfl: 11    isosorbide mononitrate (IMDUR) 30 MG 24 hr tablet, Take 1 tablet (30 mg total) by mouth every evening., Disp: 30 tablet, Rfl: 3    ketoconazole (NIZORAL) 2 % cream, Apply topically 2 (two) times daily. For dry flaky patches of ear., Disp: 30 g, Rfl: 1    latanoprost 0.005 % ophthalmic solution, Place 1 drop into both eyes every evening., Disp: 2.5 mL, Rfl: 12    levocetirizine (XYZAL) 5 MG tablet, Take 1 tablet (5 mg total) by mouth every evening., Disp: 30 tablet, Rfl: 11    magnesium oxide (MAG-OX) 400 mg (241.3 mg magnesium) tablet, Take 1 tablet (400 mg total) by mouth once daily., Disp: 90 tablet, Rfl: 1    metFORMIN (GLUCOPHAGE) 500 MG tablet, TAKE 1 TABLET BY MOUTH ONCE DAILY, Disp: 90 tablet, Rfl: 3    methylPREDNISolone (MEDROL DOSEPACK) 4 mg tablet, use as directed, Disp: 1 each, Rfl: 0    metoprolol tartrate (LOPRESSOR) 100 MG tablet, Take 1 tablet (100 mg total) by mouth 2 (two) times daily., Disp: 180 tablet, Rfl: 1    montelukast (SINGULAIR) 10 mg tablet, TAKE 1 TABLET BY MOUTH ONCE A DAY, Disp: 90 tablet, Rfl: 3    MYRBETRIQ 50 mg Tb24, TAKE 1 TABLET BY MOUTH ONCE DAILY, Disp: 30 tablet, Rfl: 10    nitroGLYCERIN (NITROSTAT) 0.4 MG SL tablet, DISSOLVE 1 TABLET UNDER TONGUE EVERY 5 MINUTES FOR CHEST PAIN (MAY TAKE UP TO 3 DOSES THEN SEEK MEDICAL ATTENTION), Disp: 25 tablet, Rfl: 0    nortriptyline (PAMELOR) 25 MG capsule, Take 1 capsule (25 mg total) by mouth every evening., Disp: 90 capsule, Rfl: 0    omeprazole (PRILOSEC) 40 MG capsule, Take 40 mg by mouth every morning., Disp: , Rfl:     pantoprazole (PROTONIX) 40 MG tablet,  Take 1 tablet (40 mg total) by mouth 2 (two) times daily., Disp: 60 tablet, Rfl: 11    potassium chloride SA (K-DUR,KLOR-CON) 20 MEQ tablet, Take 1 tablet (20 mEq total) by mouth once daily., Disp: 90 tablet, Rfl: 1    ranolazine (RANEXA) 1,000 mg Tb12, Take 1 tablet (1,000 mg total) by mouth 2 (two) times daily., Disp: 180 tablet, Rfl: 3    tiZANidine (ZANAFLEX) 4 MG tablet, Take 1-1.5 tablets (4-6 mg total) by mouth 2 (two) times daily as needed (muscle spasms). May cause drowsiness., Disp: 90 tablet, Rfl: 4    triamcinolone acetonide 0.025% (KENALOG) 0.025 % cream, Apply topically 2 (two) times daily. PRN rash and itching of ear. Mild steroid cream., Disp: 30 g, Rfl: 0    valsartan (DIOVAN) 320 MG tablet, Take 1 tablet (320 mg total) by mouth once daily., Disp: 30 tablet, Rfl: 3  Past Medical History:   Diagnosis Date    Abnormal ECG 8/5/2019    Aneurysm     Anticoagulant long-term use     Arthritis     CAD in native artery 8/5/2019    COPD (chronic obstructive pulmonary disease)     Diabetes mellitus     Fall 10/10/2019    Formatting of this note might be different from the original. Found sitting on floor next to bed last night Mild confusion today Does not recall falling or how she ended up on floor UA today Labs yesterday unremarkable    Glaucoma     Hypertension     Seizures     Shingles 05/27/2017    Stroke      Past Surgical History:   Procedure Laterality Date    BRAIN SURGERY      CARDIAC CATHETERIZATION      CORONARY ANGIOPLASTY      EPIDURAL STEROID INJECTION INTO LUMBAR SPINE Bilateral 11/12/2019    Procedure: TF XANDER L4/5;  Surgeon: Desean Dias MD;  Location: [facility];  Service: Pain Management;  Laterality: Bilateral;    HYSTERECTOMY      INJECTION OF ANESTHETIC AGENT AROUND MEDIAL BRANCH NERVES INNERVATING LUMBAR FACET JOINT Bilateral 1/31/2020    Procedure: Bilateral L3-5 MBB;  Surgeon: Desean Dias MD;  Location: [facility];  Service: Pain Management;  Laterality: Bilateral;     INJECTION OF ANESTHETIC AGENT INTO SACROILIAC JOINT Right 2021    Procedure: Right BLOCK, SACROILIAC JOINT Right GTB with RN IV sedation;  Surgeon: Yao Fulton MD;  Location: [facility];  Service: Pain Management;  Laterality: Right;    INJECTION OF JOINT Bilateral 2020    Procedure: Bilateral shoulder GH Joint injection with local;  Surgeon: Desean Dias MD;  Location: [facility];  Service: Pain Management;  Laterality: Bilateral;    SELECTIVE INJECTION OF ANESTHETIC AGENT AROUND LUMBAR SPINAL NERVE ROOT BY TRANSFORAMINAL APPROACH Bilateral 2023    Procedure: Bilateral L4/5 TF XANDER RN IV Sedation;  Surgeon: Francisco Oshea MD;  Location: [facility];  Service: Pain Management;  Laterality: Bilateral;    TRANSFORAMINAL EPIDURAL INJECTION OF STEROID Bilateral 2019    Procedure: Bilateral L3/4 Transforaminal Epidural Steroid Injection;  Surgeon: Desean Dias MD;  Location: [facility];  Service: Pain Management;  Laterality: Bilateral;    TRANSFORAMINAL EPIDURAL INJECTION OF STEROID Bilateral 3/10/2020    Procedure: Right T12/L1 TF XANDER with local;  Surgeon: Desean Dias MD;  Location: [facility];  Service: Pain Management;  Laterality: Bilateral;    TRANSFORAMINAL EPIDURAL INJECTION OF STEROID Right 2020    Procedure: Right T12/L1 TFESI Covid day of procedure;  Surgeon: Desean Dias MD;  Location: [facility];  Service: Pain Management;  Laterality: Right;     Social History     Socioeconomic History    Marital status: Single   Tobacco Use    Smoking status: Former     Packs/day: 1.00     Years: 42.00     Pack years: 42.00     Types: Cigarettes     Start date:      Quit date: 2004     Years since quittin.1    Smokeless tobacco: Never   Substance and Sexual Activity    Alcohol use: No    Drug use: Never    Sexual activity: Not Currently     Partners: Male   Social History Narrative    No pets or smokers in household.             Past/Current  Medical/Surgical History, Past/Current Social History, Past/Current Family History and Past/Current Medications were reviewed in detail.        Objective:           VITAL SIGNS WERE REVIEWED      GENERAL APPEARANCE:     The patient looks comfortable.    BMI 24.62    No signs of respiratory distress.    Normal breathing pattern.    No dysmorphic features    Normal eye contact.     GENERAL MEDICAL EXAM:    HEENT:  Head is atraumatic normocephalic. Fundoscopic (Ophthalmoscopic) exam showed no disc edema.      Neck and Axillae: No JVD. No visible lesions.    Cardiopulmonary: No cyanosis. No tachypnea. Normal respiratory effort.    Gastrointestinal/Urogenital:  No jaundice. No stomas or lesions. No visible hernias. No catheters.     Skin, Hair and Nails: Multiple ecchymoses. No stigmata of autoimmune disease. No clubbing.    Limbs: No varicose veins. No visible swelling.    Muskoskeletal: No visible deformities.No visible lesions.           Neurologic Exam     Mental Status   Oriented to person, place, and time.   Follows 3 step commands.   Attention: normal. Concentration: normal.   Speech: speech is normal   Level of consciousness: alert  Able to perform simple calculations.   Able to name object. Able to repeat. Normal comprehension.     Cranial Nerves     CN II   Visual acuity: decreased  Right visual field deficit: none  Left visual field deficit: upper temporal and upper nasal quadrant(s)    CN III, IV, VI   Pupils are equal, round, and reactive to light.  Extraocular motions are normal.   Right pupil: Size: 2 mm. Shape: regular. Reactivity: brisk. Consensual response: intact. Accommodation: intact.   Left pupil: Size: 2 mm. Shape: regular. Reactivity: brisk. Consensual response: intact. Accommodation: intact.   CN III: no CN III palsy  CN VI: no CN VI palsy  Nystagmus: none   Diplopia: none  Ophthalmoparesis: none  Upgaze: normal  Downgaze: normal  Conjugate gaze: present  Vestibulo-ocular reflex: present    CN V    Facial sensation intact.   Right facial sensation deficit: none  Left facial sensation deficit: none    CN VII   Facial expression full, symmetric.   Right facial weakness: none  Left facial weakness: none    CN VIII   CN VIII normal.   Hearing: intact    CN IX, X   CN IX normal.   CN X normal.   Palate: symmetric    CN XI   CN XI normal.   Right sternocleidomastoid strength: normal  Left sternocleidomastoid strength: normal  Right trapezius strength: normal  Left trapezius strength: normal    CN XII   CN XII normal.   Tongue: not atrophic  Fasciculations: absent  Tongue deviation: none    Motor Exam   Muscle bulk: decreased  Right arm tone: normal  Left arm tone: increased  Right leg tone: normal  Left leg tone: normal    Strength   Strength 5/5 throughout.   Right neck flexion: 5/5  Left neck flexion: 4/5  Right neck extension: 5/5  Left neck extension: 4/5  Right deltoid: 5/5  Left deltoid: 4/5  Right biceps: 5/5  Left biceps: 4/5  Right triceps: 5/5  Left triceps: 4/5  Right wrist flexion: 5/5  Left wrist flexion: 4/5  Right wrist extension: 5/5  Left wrist extension: 4/5  Right interossei: 5/5  Left interossei: 4/5  Right iliopsoas: 5/5  Left iliopsoas: 5/5  Right quadriceps: 5/5  Left quadriceps: 5/5  Right hamstrin/5  Left hamstrin/5  Right glutei: 5/5  Left glutei: 5/5  Right anterior tibial: 5/5  Left anterior tibial: 5/5  Right posterior tibial: 5/5  Left posterior tibial: 5/5  Right peroneal: 5/5  Left peroneal: 5/5  Right gastroc: 5/5  Left gastroc: 5/5    Sensory Exam   Right arm light touch: decreased from wrist  Left arm light touch: decreased from wrist  Right leg light touch: decreased from ankle  Left leg light touch: decreased from ankle  Right arm vibration: normal  Left arm vibration: normal  Right leg vibration: decreased from toes  Left leg vibration: decreased from toes  Right arm proprioception: normal  Left arm proprioception: normal  Right leg proprioception: decreased from  toes  Left leg proprioception: decreased from toes  Right arm pinprick: decreased from wrist  Left arm pinprick: decreased from wrist  Right leg pinprick: decreased from ankle  Left leg pinprick: decreased from ankle  Graphesthesia: normal  Stereognosis: normal    Gait, Coordination, and Reflexes     Gait  Gait: (Antalgic)    Coordination   Finger to nose coordination: normal  Heel to shin coordination: normal    Tremor   Resting tremor: absent  Intention tremor: absent  Action tremor: absent    Reflexes   Right brachioradialis: 1+  Left brachioradialis: 1+  Right biceps: 1+  Left biceps: 1+  Right triceps: 1+  Left triceps: 1+  Right patellar: 1+  Left patellar: 1+  Right achilles: 0  Left achilles: 0  Right plantar: normal  Left plantar: normal  Right Thompson: absent  Left Thompson: absent  Right ankle clonus: absent  Left ankle clonus: absent  Right pendular knee jerk: absent  Left pendular knee jerk: absent    Lab Results   Component Value Date    WBC 10.45 01/06/2022    HGB 12.1 01/06/2022    HCT 37.0 01/06/2022    MCV 95 01/06/2022     01/06/2022     Sodium   Date Value Ref Range Status   11/17/2022 135 (L) 136 - 145 mmol/L Final     Potassium   Date Value Ref Range Status   11/17/2022 3.6 3.5 - 5.1 mmol/L Final     Chloride   Date Value Ref Range Status   11/17/2022 100 95 - 110 mmol/L Final     CO2   Date Value Ref Range Status   11/17/2022 22 (L) 23 - 29 mmol/L Final     Glucose   Date Value Ref Range Status   11/17/2022 125 (H) 70 - 110 mg/dL Final     BUN   Date Value Ref Range Status   11/17/2022 15 8 - 23 mg/dL Final     Creatinine   Date Value Ref Range Status   11/17/2022 0.8 0.5 - 1.4 mg/dL Final     Calcium   Date Value Ref Range Status   11/17/2022 10.4 8.7 - 10.5 mg/dL Final     Total Protein   Date Value Ref Range Status   11/17/2022 7.5 6.0 - 8.4 g/dL Final     Albumin   Date Value Ref Range Status   11/17/2022 3.4 (L) 3.5 - 5.2 g/dL Final     Total Bilirubin   Date Value Ref Range Status    11/17/2022 0.4 0.1 - 1.0 mg/dL Final     Comment:     For infants and newborns, interpretation of results should be based  on gestational age, weight and in agreement with clinical  observations.    Premature Infant recommended reference ranges:  Up to 24 hours.............<8.0 mg/dL  Up to 48 hours............<12.0 mg/dL  3-5 days..................<15.0 mg/dL  6-29 days.................<15.0 mg/dL       Alkaline Phosphatase   Date Value Ref Range Status   11/17/2022 62 55 - 135 U/L Final     AST   Date Value Ref Range Status   11/17/2022 18 10 - 40 U/L Final     ALT   Date Value Ref Range Status   11/17/2022 13 10 - 44 U/L Final     Anion Gap   Date Value Ref Range Status   11/17/2022 13 8 - 16 mmol/L Final     eGFR if    Date Value Ref Range Status   06/02/2022 >60 >60 mL/min/1.73 m^2 Final     eGFR if non    Date Value Ref Range Status   06/02/2022 >60 >60 mL/min/1.73 m^2 Final     Comment:     Calculation used to obtain the estimated glomerular filtration  rate (eGFR) is the CKD-EPI equation.        Lab Results   Component Value Date    RPAECNES40 583 10/27/2021     Lab Results   Component Value Date    TSH 1.249 08/09/2021    [id number] 5.9 10/27/2021         03->05-    CTH Bilateral craniotomy changes in the frontal and temporal regions.  Aneurysm clip present in the floor of the left middle cranial fossa.  Old appearing lacunar infarct in the anterior limb of the right internal capsule.             [id number]    CT Spine Multilevel DDD          08-    ESR is NL for her age and improving 80>67>58     Unlikely GCA/TA.         08- (Interpreted 08-)    EEG NL       09- through 10-     92 hours AEEG    B/L TLE, F8-T8, F7-T7      10-    CTA H/N Stable. No evidence of acute intracranial hemorrhage.Bilateral craniotomies with LT MCA aneurysm clip.No evidence of recurrent or residual aneurysm sac.  No other  aneurysms.      10-    Labs NL    B12-MMA, FA-HC, T4, RPR       02-    CT C/L Spine MRI  Multilevel DDD      05-    CTH No new changes         Reviewed the neuroimaging independently       Assessment:       1. Multiple neurological symptoms    2. History of stroke    3. Hx of completed stroke    4. Vertigo    5. Mucopurulent chronic bronchitis    6. Postherpetic neuralgia    7. Conductive hearing loss, unilateral    8. Nasal septal perforation    9. Chronic suppurative otitis media, unspecified laterality, unspecified otitis media location    10. Primary snoring    11. Fatigue, unspecified type    12. Bilateral renal cysts    13. Multiple pulmonary nodules    14. Lumbar radiculopathy, chronic    15. Embolism and thrombosis of unspecified artery    16. Moderate persistent asthma without complication    17. Chronic diastolic heart failure    18. Fluctuating blood pressure    19. Sacroiliitis, not elsewhere classified    20. Spinal stenosis, unspecified spinal region    21. Syncope, unspecified syncope type    22. Temporal lobe epilepsy    23. History of cerebral aneurysm    24. DDD (degenerative disc disease), cervical    25. Hx of craniotomy    26. DDD (degenerative disc disease), lumbar    27. DDD (degenerative disc disease), thoracolumbar    28. DDD (degenerative disc disease), lumbosacral    29. DDD (degenerative disc disease), thoracic    30. Polypharmacy    31. Vitamin D deficiency    32. Primary insomnia    33. Perforation of right tympanic membrane    34. Overactive bladder    35. Mixed hyperlipidemia    36. Moderate recurrent major depression    37. Other constipation    38. Orthostatic hypotension    39. Depression, unspecified depression type    40. Mild protein-calorie malnutrition    41. Long term current use of insulin    42. Imbalance    43. Type 2 diabetes mellitus with hyperglycemia, with long-term current use of insulin    44. Chronic allergic rhinitis    45. Elevated IgE level     46. Gastroesophageal reflux disease, unspecified whether esophagitis present    47. Cubital tunnel syndrome on right    48. Cervical radiculopathy    49. Spontaneous ecchymosis    50. Bilateral carpal tunnel syndrome    51. Shoulder arthritis    52. Age-related osteoporosis without current pathological fracture    53. Decreased GFR    54. Chronic low back pain with sciatica, sciatica laterality unspecified, unspecified back pain laterality    55. Chronic pain syndrome    56. Controlled type 2 diabetes mellitus with stage 3 chronic kidney disease, without long-term current use of insulin    57. Recurrent falls    58. Primary osteoarthritis of left wrist    59. Debility    60. Hyponatremia    61. Normocytic anemia    62. Lumbar radiculopathy    63. Neuroforaminal stenosis of lumbar spine    64. Coronary artery disease of native artery of native heart with stable angina pectoris    65. Pulmonary interstitial fibrosis    66. Spinal stenosis of lumbar region with neurogenic claudication    67. Other iron deficiency anemia    68. Primary hypertension    69. History of cerebral aneurysm repair    70. History of tobacco abuse            Modified Huntington Score (m-RS)      0  The patient has no residual symptoms.    1  The patient has no significant disability; able to carry out all pre-stroke activities.    2  The patient has slight disability; unable to carry out all pre-stroke activities but able to look after self without daily help.    3  The patient has moderate disability; requiring some external help but able to walk without the assistance of another individual.    4  The patient has moderately severe disability; unable to walk or attend to bodily functions without assistance of another individual.    5  The patient has severe disability; bedridden, incontinent, requires continuous care.    6  The patient has  (during the hospital stay or after discharge from the hospital).      EPILEPSY  CLASSIFICATION    SEMIOLOGY:  DROP ATTACKS     EPILEPTOGENIC ZONE (S):  TEMPORAL LOBE     ETIOLOGY: STROKE, CRANIOTOMY     PRIOR AEDS: , PHT, OXC     CURRENT AEDS: ESL     LAST SEIZURE DATE:        COMPREHENSIVE LIST OF AEDs:     Acetazolamide (AZM-Diamox)   Benzos: clonazepam (CZP Klonopin), lorazepam (LZP-Ativan), diazepam (DZP-Valium), clorazepate (CLZ- Tranxene)  Brivaracetam (BRV-Briviact)  Cannabidiol (CBD- Epidiolex)  Carbamazepine (CBZ-Tegretol)  Cenobamate (CNB-Xcopri)  Clobazam (CLB-Onfi)  Eslicarbazepine (ESL-Aptiom)  Ethosuximide (ESX-Zarontin)  Felbamate (FBM-Felbatol)  Fenfluramine (FFA-Fintepla)  Gabapentin (GBP-Neurontin)  Lacosamide (LCM-Vimpat)  Lamotrigine (LTG-Lamitcal)  Levetiracetam (LEV- Keppra)  Oxcarbazepine (OXC-Trileptal)  Perampanel (PML-Fycompa)  Phenobarbital (PB)  Phenytoin (PHT-Dilantin)  Pregabalin (PGB-Lyrica)  Primidone (PRM)   Retigabine (RTG- Potiga) Discontinued in 2017  Rufinamide (RFN-Benzil)  Stiripentol (STP-Diacomit)  Tiagabine (TGB-Gabitril)  Topiramate (TPM-Topamax)  Valproate (VPA-Depakote)  Vigabatrin (VGB-Sabril)  Zonisamide (ZNS-Zonegran)   Plan:           HISTORY OF LACUNAR RT PLIC STROKES        Continue Plavix.     Vascular Risk Factors (VRFs) stratification (BP, BS, BC control and Smoking Cessation) is the mainstay of stroke prevention (>90%)..       Healthy diet and exercise    Avoid driving.    Call 911 if any SUDDEN:   Weakness  Numbness  Slurring of speech  Speech difficulty   Vertigo  Loss of balance  Loss of vision  Loss of hearing  Double vision  Trouble swallowing  Trouble breathing  Facial drooping        HISTORY OF MULTIPLE ANEURYSMS S/P CRANIOTOMY       Call 911 for the worst head (thunderclap headache).          DROP ATTACKS, SEEM TO BE SYNCOPAL AND EPILEPTIC, TEMPORAL LOBE EPILEPSY       Full seizure precautions and avoid driving.    Taper ESL slowly over 6 weeks due to hyponatremia.     Add  mg BID.     The patient was encouraged to  maintain full traditional seizure precautions which include but not limited to avoidance of driving, biking, high altitudes (ladders, escalators, rock climbing, mountain climbing, skiing, jian diving, moderate to difficult hiking), proximity to fire or fire source, proximity to body of water or swimming alone, operating heavy and potentially risky machinery, using sharp objects, using exercise machines like treadmill and weight lifting. Walking while accompanied on soft surfaces like grass is preferable.The patient was encouraged to shower (without accumulation of water) instead of taking a bath if unsupervised. The patient was made aware that these precautions are especially important during concurrent illnesses, fever, infections, vomiting, changing medications and running out of anti-seizure medications. Instructed the patient to avoid night shifts, sleep deprivation and alcohol or recreational drug use as adequate sleep and avoidance of alcohol/drugs are very important measures to assure good seizure control. The patient was also advised not to care for young children without company. The patient is advised to pad the side rails with pillows and blankets if applicable.I strongly recommended lowering the bed to the floor level to decrease the risk of falls during nocturnal seizures that occur during sleep. I also instructed the patient to avoid safety sensitive duties. In general, any activity that requires full awareness and would result in serious injury to self and others if a seizure takes place should be avoided.    AVOID any substance that could lower seizure threshold including but not limited to:        ALCOHOL AND WITHDRAWAL      TRAMADOL.     MEPERIDINE (DEMEROL)     ALL STIMULANTS-ALL ADHD MEDICATIONS.      CLOZAPINE.      BUPROPION (WELLBUTRIN)     CIPROFLOXACIN.    CYCLOSPORINE.     METOCLOPRAMIDE (REGLAN).     TETRAHYDROCANNABINOL (THC)    KRATOM           POST-HERPETIC NEURALGIA  (PHN)        Compound neuropathic pain cream.           BENIGN MEMORY LOSS: MULTIFACTORIAL       Monitor clinically.      RECURRENT FALLS: MULTIFACTORIAL: EXTENSIVE POLYPHARMACY, HYPONATREMIA        CTH STAT.    Falling down precautions.    Full seizure precautions and avoid driving.    Taper ESL slowly over 6 weeks due to hyponatremia.     Add  mg BID.     Pharmacy review for polypharmacy                  MEDICAL/SURGICAL COMORBIDITIES     All relevant medical comorbidities noted and managed by primary care physician and medical care team.          MISCELLANEOUS MEDICAL PROBLEMS       HEALTHY LIFESTYLE AND PREVENTATIVE CARE    The patient to adhere to the age-appropriate health maintenance guidelines including screening tests and vaccinations. The patient to adhere to  healthy lifestyle, optimal weight, exercise, healthy diet, good sleep hygiene and avoiding drugs including smoking, alcohol and recreational drugs.        RTC in 6 months               Emerita Simpson MD, FAAN    Attending Neurologist/Epileptologist         Diplomate, American Board of Psychiatry and Neurology    Diplomate, American Board of Clinical Neurophysiology     Fellow, American Academy of Neurology         I spent a total of 99 minutes on the day of the visit.  This includes face to face time and non-face to face time preparing to see the patient (eg, review of tests), obtaining and/or reviewing separately obtained history, documenting clinical information in the electronic or other health record, independently interpreting results and communicating results to the patient/family/caregiver, or care coordinator.

## 2023-05-23 ENCOUNTER — TELEPHONE (OUTPATIENT)
Dept: CARDIOLOGY | Facility: CLINIC | Age: 78
End: 2023-05-23
Payer: MEDICARE

## 2023-05-23 RX ORDER — LANOLIN ALCOHOL/MO/W.PET/CERES
400 CREAM (GRAM) TOPICAL DAILY
Qty: 90 TABLET | Refills: 1 | Status: SHIPPED | OUTPATIENT
Start: 2023-05-23

## 2023-05-23 RX ORDER — POTASSIUM CHLORIDE 20 MEQ/1
20 TABLET, EXTENDED RELEASE ORAL DAILY
Qty: 90 TABLET | Refills: 1 | Status: SHIPPED | OUTPATIENT
Start: 2023-05-23

## 2023-05-23 NOTE — TELEPHONE ENCOUNTER
Spoke with pt in regards to       Please contact the patient and let them know that their results reveal low magnesium and potassium, recommend doubling magnesium oxide to twice a day, take extra potasium next 3 days. BNP - fluid level is mildly elevated, can continue taking lasix as needed for swelling/fluid but also take an extra potassium tablet with the lasix. Iron levels are stable/improved. Calcium level is low follow up with PCP.  Ascencion Dye      Pt verbalized understanding with no questions or concerns

## 2023-05-24 ENCOUNTER — PATIENT MESSAGE (OUTPATIENT)
Dept: CARDIOLOGY | Facility: CLINIC | Age: 78
End: 2023-05-24
Payer: MEDICARE

## 2023-05-24 DIAGNOSIS — R60.0 LOCALIZED EDEMA: ICD-10-CM

## 2023-05-24 DIAGNOSIS — M79.604 PAIN IN BOTH LOWER EXTREMITIES: Primary | ICD-10-CM

## 2023-05-24 DIAGNOSIS — M79.605 PAIN IN BOTH LOWER EXTREMITIES: Primary | ICD-10-CM

## 2023-05-24 DIAGNOSIS — R00.2 PALPITATIONS: ICD-10-CM

## 2023-05-25 RX ORDER — PANTOPRAZOLE SODIUM 40 MG/1
TABLET, DELAYED RELEASE ORAL
Qty: 60 TABLET | Refills: 11 | Status: SHIPPED | OUTPATIENT
Start: 2023-05-25 | End: 2023-05-26

## 2023-05-25 NOTE — TELEPHONE ENCOUNTER
Refill Routing Note   Medication(s) are not appropriate for processing by [facility] for the following reason(s):      Drug-drug interaction: plavix  No active prescription written by PCP    [facility] action(s):  Defer Care Due:  None identified          Appointments  past 12m or future 3m with PCP    Date Provider   Last Visit   3/3/2023 CARLEE Chaudhari Jr., MD   Next Visit   Visit date not found CARLEE Chaudhari Jr., MD   ED visits in past 90 days: 0        Note composed:4:49 PM 05/25/2023

## 2023-05-26 RX ORDER — OMEPRAZOLE 40 MG/1
CAPSULE, DELAYED RELEASE ORAL
Qty: 90 CAPSULE | Refills: 3 | Status: SHIPPED | OUTPATIENT
Start: 2023-05-26 | End: 2023-08-18 | Stop reason: CLARIF

## 2023-05-31 ENCOUNTER — PATIENT MESSAGE (OUTPATIENT)
Dept: OTOLARYNGOLOGY | Facility: CLINIC | Age: 78
End: 2023-05-31
Payer: MEDICARE

## 2023-06-01 ENCOUNTER — TELEPHONE (OUTPATIENT)
Dept: OTOLARYNGOLOGY | Facility: CLINIC | Age: 78
End: 2023-06-01
Payer: MEDICARE

## 2023-06-01 ENCOUNTER — OFFICE VISIT (OUTPATIENT)
Dept: NEPHROLOGY | Facility: CLINIC | Age: 78
End: 2023-06-01
Payer: MEDICARE

## 2023-06-01 VITALS
HEIGHT: 64 IN | BODY MASS INDEX: 20.49 KG/M2 | OXYGEN SATURATION: 99 % | DIASTOLIC BLOOD PRESSURE: 80 MMHG | HEART RATE: 73 BPM | SYSTOLIC BLOOD PRESSURE: 162 MMHG | WEIGHT: 120 LBS

## 2023-06-01 DIAGNOSIS — Z87.891 HISTORY OF TOBACCO ABUSE: ICD-10-CM

## 2023-06-01 DIAGNOSIS — N28.1 BILATERAL RENAL CYSTS: ICD-10-CM

## 2023-06-01 DIAGNOSIS — I10 PRIMARY HYPERTENSION: ICD-10-CM

## 2023-06-01 DIAGNOSIS — E55.9 VITAMIN D DEFICIENCY: ICD-10-CM

## 2023-06-01 DIAGNOSIS — E87.6 HYPOKALEMIA: ICD-10-CM

## 2023-06-01 DIAGNOSIS — N18.2 STAGE 2 CHRONIC KIDNEY DISEASE: Primary | ICD-10-CM

## 2023-06-01 DIAGNOSIS — I50.32 CHRONIC DIASTOLIC HEART FAILURE: ICD-10-CM

## 2023-06-01 DIAGNOSIS — E83.42 HYPOMAGNESEMIA: ICD-10-CM

## 2023-06-01 DIAGNOSIS — E87.1 HYPONATREMIA: ICD-10-CM

## 2023-06-01 PROCEDURE — 99999 PR PBB SHADOW E&M-EST. PATIENT-LVL V: CPT | Mod: PBBFAC,,, | Performed by: INTERNAL MEDICINE

## 2023-06-01 PROCEDURE — 1125F PR PAIN SEVERITY QUANTIFIED, PAIN PRESENT: ICD-10-PCS | Mod: CPTII,S$GLB,, | Performed by: INTERNAL MEDICINE

## 2023-06-01 PROCEDURE — 99999 PR PBB SHADOW E&M-EST. PATIENT-LVL V: ICD-10-PCS | Mod: PBBFAC,,, | Performed by: INTERNAL MEDICINE

## 2023-06-01 PROCEDURE — 3079F PR MOST RECENT DIASTOLIC BLOOD PRESSURE 80-89 MM HG: ICD-10-PCS | Mod: CPTII,S$GLB,, | Performed by: INTERNAL MEDICINE

## 2023-06-01 PROCEDURE — 3288F FALL RISK ASSESSMENT DOCD: CPT | Mod: CPTII,S$GLB,, | Performed by: INTERNAL MEDICINE

## 2023-06-01 PROCEDURE — 1100F PTFALLS ASSESS-DOCD GE2>/YR: CPT | Mod: CPTII,S$GLB,, | Performed by: INTERNAL MEDICINE

## 2023-06-01 PROCEDURE — 3077F PR MOST RECENT SYSTOLIC BLOOD PRESSURE >= 140 MM HG: ICD-10-PCS | Mod: CPTII,S$GLB,, | Performed by: INTERNAL MEDICINE

## 2023-06-01 PROCEDURE — 1100F PR PT FALLS ASSESS DOC 2+ FALLS/FALL W/INJURY/YR: ICD-10-PCS | Mod: CPTII,S$GLB,, | Performed by: INTERNAL MEDICINE

## 2023-06-01 PROCEDURE — 1159F PR MEDICATION LIST DOCUMENTED IN MEDICAL RECORD: ICD-10-PCS | Mod: CPTII,S$GLB,, | Performed by: INTERNAL MEDICINE

## 2023-06-01 PROCEDURE — 3079F DIAST BP 80-89 MM HG: CPT | Mod: CPTII,S$GLB,, | Performed by: INTERNAL MEDICINE

## 2023-06-01 PROCEDURE — 3288F PR FALLS RISK ASSESSMENT DOCUMENTED: ICD-10-PCS | Mod: CPTII,S$GLB,, | Performed by: INTERNAL MEDICINE

## 2023-06-01 PROCEDURE — 99204 OFFICE O/P NEW MOD 45 MIN: CPT | Mod: S$GLB,,, | Performed by: INTERNAL MEDICINE

## 2023-06-01 PROCEDURE — 99204 PR OFFICE/OUTPT VISIT, NEW, LEVL IV, 45-59 MIN: ICD-10-PCS | Mod: S$GLB,,, | Performed by: INTERNAL MEDICINE

## 2023-06-01 PROCEDURE — 1125F AMNT PAIN NOTED PAIN PRSNT: CPT | Mod: CPTII,S$GLB,, | Performed by: INTERNAL MEDICINE

## 2023-06-01 PROCEDURE — 1159F MED LIST DOCD IN RCRD: CPT | Mod: CPTII,S$GLB,, | Performed by: INTERNAL MEDICINE

## 2023-06-01 PROCEDURE — 3077F SYST BP >= 140 MM HG: CPT | Mod: CPTII,S$GLB,, | Performed by: INTERNAL MEDICINE

## 2023-06-01 NOTE — TELEPHONE ENCOUNTER
----- Message from Evette Marrero sent at 6/1/2023  4:14 PM CDT -----  Contact: pt's brother/Rm  Type:  Patient Returning Call    Who Called: Rm  Who Left Message for Patient: nurse  Does the patient know what this is regarding?:   Would the patient rather a call back or a response via [facility]? phone  Best Call Back Number: [phone]  Additional Information:

## 2023-06-01 NOTE — PROGRESS NOTES
Subjective:      Patient ID: Shireen Felix is a 77 y.o. White female who presents for new evaluation of Consult    HPI    She is referred to see Nephrology. She is here with her brother who she terms him her  'caretaker'   She believes she is here for CKD Stage 3. Notable is hyponatremia, hypokalemia on a diuretic    No known kidney disease in family   DM runs in the family   HTN with CVA in 2000 with BP running high since she fell in May and now uncontrolled BP, are more so labile. Currently BP is managed by Cards  Low sodium diet but has been told to drink saltwater lately   She hydrates with mostly water   Plenty of fruits and vegetables (cucumber)   Asking about vitamins  She is looking for answers as to why she feels woozy and with balance problems, also with fatigue, and when asked specifically a 'swimming head'     Review of Systems   Constitutional:  Positive for activity change and fatigue. Negative for appetite change.   HENT:  Negative for facial swelling.    Respiratory:  Negative for shortness of breath.    Cardiovascular:  Negative for leg swelling.   Gastrointestinal:  Negative for constipation, diarrhea, nausea and vomiting.   Musculoskeletal:  Negative for joint swelling.   Allergic/Immunologic: Positive for immunocompromised state (age, CV disease, DM).   Neurological:  Positive for weakness and light-headedness. Negative for syncope.   Psychiatric/Behavioral:  Negative for confusion and decreased concentration.     Objective:     Physical Exam  Vitals and nursing note reviewed.   Constitutional:       General: She is not in acute distress.     Appearance: She is normal weight. She is not toxic-appearing.   HENT:      Head: Atraumatic.   Eyes:      General: No scleral icterus.  Cardiovascular:      Rate and Rhythm: Normal rate.   Pulmonary:      Effort: Pulmonary effort is normal. No respiratory distress.   Abdominal:      General: There is no distension.   Musculoskeletal:      Right lower  leg: No edema.      Left lower leg: No edema.   Skin:     General: Skin is warm and dry.   Neurological:      Mental Status: She is alert and oriented to person, place, and time.   Psychiatric:         Mood and Affect: Mood normal.     Assessment:     1. Stage 2 chronic kidney disease    2. Hyponatremia    3. Bilateral renal cysts    4. Vitamin D deficiency    5. History of tobacco abuse    6. Hypokalemia    7. Hypomagnesemia    8. Chronic diastolic heart failure    9. Primary hypertension       Plan:     CKD  - her GFR has dipped a few times in the past few years but she appears to be more so Stage 2  - discussed Stages of CKD, and more importantly explained her level of CKD will not cause the symptoms for which she is concerned     Bilateral Renal Cysts  - seen on CT scan from 2020 and previous form 2016, appearing to be of no worrisome nature  - can offer renal u/s for verification    Hyponatremia  - appears Neurology has adjusted her seizure medications and most recent level has normalized   - she is on HCTZ and if sodium does not remain corrected can consider discontinuing   - she does have a significant history of smoking and can consider low dose Chest CT screen     Hypokalemia  - diuretic induced  - consider addition of Aldactone, and discontinuing HCT    Hypomagnesemia  - replete as doing, supplied diet examples to include pumpkin seeds       RTC prn

## 2023-06-01 NOTE — TELEPHONE ENCOUNTER
Spoke with pt.  Has audio and Dr Light appt tomorrow.  Dr Light will be in surgery.  We can either have her complete the audio tomorrow and just reschedule the ENT appt or we can reschedule both.  She is going to have her brother call back

## 2023-06-01 NOTE — TELEPHONE ENCOUNTER
Spoke to brother and informed we need to r/s her appt due to Dr. Light being in surgery. Pt has a ton of appts this month and wants to know if she needs to r/s or can we call her with audio results, etc?

## 2023-06-02 ENCOUNTER — HOSPITAL ENCOUNTER (OUTPATIENT)
Dept: RADIOLOGY | Facility: HOSPITAL | Age: 78
Discharge: HOME | End: 2023-06-02
Attending: INTERNAL MEDICINE
Payer: MEDICARE

## 2023-06-02 ENCOUNTER — CLINICAL SUPPORT (OUTPATIENT)
Dept: AUDIOLOGY | Facility: CLINIC | Age: 78
End: 2023-06-02
Payer: MEDICARE

## 2023-06-02 ENCOUNTER — HOSPITAL ENCOUNTER (OUTPATIENT)
Dept: CARDIOLOGY | Facility: HOSPITAL | Age: 78
Discharge: HOME | End: 2023-06-02
Attending: INTERNAL MEDICINE
Payer: MEDICARE

## 2023-06-02 ENCOUNTER — TELEPHONE (OUTPATIENT)
Dept: OTOLARYNGOLOGY | Facility: CLINIC | Age: 78
End: 2023-06-02
Payer: MEDICARE

## 2023-06-02 VITALS
BODY MASS INDEX: 20.49 KG/M2 | SYSTOLIC BLOOD PRESSURE: 137 MMHG | WEIGHT: 120 LBS | DIASTOLIC BLOOD PRESSURE: 78 MMHG | HEIGHT: 64 IN

## 2023-06-02 DIAGNOSIS — I99.8 FLUCTUATING BLOOD PRESSURE: ICD-10-CM

## 2023-06-02 DIAGNOSIS — H90.A31 MIXED CONDUCTIVE AND SENSORINEURAL HEARING LOSS OF RIGHT EAR WITH RESTRICTED HEARING OF LEFT EAR: ICD-10-CM

## 2023-06-02 DIAGNOSIS — E11.22 CONTROLLED TYPE 2 DIABETES MELLITUS WITH STAGE 3 CHRONIC KIDNEY DISEASE, WITHOUT LONG-TERM CURRENT USE OF INSULIN: ICD-10-CM

## 2023-06-02 DIAGNOSIS — N18.30 CONTROLLED TYPE 2 DIABETES MELLITUS WITH STAGE 3 CHRONIC KIDNEY DISEASE, WITHOUT LONG-TERM CURRENT USE OF INSULIN: ICD-10-CM

## 2023-06-02 DIAGNOSIS — R00.2 PALPITATIONS: ICD-10-CM

## 2023-06-02 DIAGNOSIS — I25.118 CORONARY ARTERY DISEASE OF NATIVE ARTERY OF NATIVE HEART WITH STABLE ANGINA PECTORIS: ICD-10-CM

## 2023-06-02 DIAGNOSIS — R42 DIZZINESS: ICD-10-CM

## 2023-06-02 DIAGNOSIS — I10 ESSENTIAL HYPERTENSION: ICD-10-CM

## 2023-06-02 DIAGNOSIS — I50.32 CHRONIC DIASTOLIC HEART FAILURE: ICD-10-CM

## 2023-06-02 DIAGNOSIS — H90.A22 SENSORINEURAL HEARING LOSS (SNHL) OF LEFT EAR WITH RESTRICTED HEARING OF RIGHT EAR: Primary | ICD-10-CM

## 2023-06-02 LAB
AORTIC ROOT ANNULUS: 3.03 CM
AV INDEX (PROSTH): 0.53
AV MEAN GRADIENT: 4 MMHG
AV PEAK GRADIENT: 8 MMHG
AV REGURGITATION PRESSURE HALF TIME: 708.36 MS
AV VALVE AREA: 1.69 CM2
AV VELOCITY RATIO: 0.52
BSA FOR ECHO PROCEDURE: 1.57 M2
CV ECHO LV RWT: 0.65 CM
DOP CALC AO PEAK VEL: 1.38 M/S
DOP CALC AO VTI: 31.6 CM
DOP CALC LVOT AREA: 3.2 CM2
DOP CALC LVOT DIAMETER: 2.02 CM
DOP CALC LVOT PEAK VEL: 0.72 M/S
DOP CALC LVOT STROKE VOLUME: 53.49 CM3
DOP CALC RVOT PEAK VEL: 0.93 M/S
DOP CALC RVOT VTI: 16 CM
DOP CALCLVOT PEAK VEL VTI: 16.7 CM
E WAVE DECELERATION TIME: 232.44 MSEC
E/A RATIO: 0.52
E/E' RATIO: 11.56 M/S
ECHO LV POSTERIOR WALL: 1.27 CM (ref 0.6–1.1)
EJECTION FRACTION: 50 %
FRACTIONAL SHORTENING: 26 % (ref 28–44)
INTERVENTRICULAR SEPTUM: 1.39 CM (ref 0.6–1.1)
IVRT: 110.37 MSEC
LA MAJOR: 4.55 CM
LA MINOR: 4.33 CM
LA WIDTH: 3.1 CM
LEFT ATRIUM SIZE: 3.25 CM
LEFT ATRIUM VOLUME INDEX MOD: 29.3 ML/M2
LEFT ATRIUM VOLUME INDEX: 24.2 ML/M2
LEFT ATRIUM VOLUME MOD: 45.95 CM3
LEFT ATRIUM VOLUME: 38 CM3
LEFT INTERNAL DIMENSION IN SYSTOLE: 2.86 CM (ref 2.1–4)
LEFT VENTRICLE DIASTOLIC VOLUME INDEX: 41.5 ML/M2
LEFT VENTRICLE DIASTOLIC VOLUME: 65.15 ML
LEFT VENTRICLE MASS INDEX: 118 G/M2
LEFT VENTRICLE SYSTOLIC VOLUME INDEX: 19.8 ML/M2
LEFT VENTRICLE SYSTOLIC VOLUME: 31.09 ML
LEFT VENTRICULAR INTERNAL DIMENSION IN DIASTOLE: 3.88 CM (ref 3.5–6)
LEFT VENTRICULAR MASS: 184.73 G
LV LATERAL E/E' RATIO: 10.4 M/S
LV SEPTAL E/E' RATIO: 13 M/S
LVOT MG: 1.17 MMHG
LVOT MV: 0.5 CM/S
MV PEAK A VEL: 1 M/S
MV PEAK E VEL: 0.52 M/S
MV STENOSIS PRESSURE HALF TIME: 67.41 MS
MV VALVE AREA P 1/2 METHOD: 3.26 CM2
PISA AR MAX VEL: 2.32 M/S
PISA TR MAX VEL: 2.61 M/S
PV MEAN GRADIENT: 1.78 MMHG
PV MV: 0.63 M/S
PV PEAK VELOCITY: 0.93 CM/S
RA MAJOR: 4.1 CM
RA PRESSURE: 3 MMHG
RA WIDTH: 2.93 CM
SINUS: 2.7 CM
STJ: 2.27 CM
TDI LATERAL: 0.05 M/S
TDI SEPTAL: 0.04 M/S
TDI: 0.05 M/S
TR MAX PG: 27 MMHG
TV REST PULMONARY ARTERY PRESSURE: 30 MMHG

## 2023-06-02 PROCEDURE — 93306 TTE W/DOPPLER COMPLETE: CPT | Mod: 26,,, | Performed by: INTERNAL MEDICINE

## 2023-06-02 PROCEDURE — 93018 CV STRESS TEST I&R ONLY: CPT | Mod: ,,, | Performed by: INTERNAL MEDICINE

## 2023-06-02 PROCEDURE — 78452 HT MUSCLE IMAGE SPECT MULT: CPT

## 2023-06-02 PROCEDURE — 92567 TYMPANOMETRY: CPT | Mod: S$GLB,,,

## 2023-06-02 PROCEDURE — 93306 ECHO (CUPID ONLY): ICD-10-PCS | Mod: 26,,, | Performed by: INTERNAL MEDICINE

## 2023-06-02 PROCEDURE — 99999 PR PBB SHADOW E&M-EST. PATIENT-LVL I: CPT | Mod: PBBFAC,,,

## 2023-06-02 PROCEDURE — 92557 COMPREHENSIVE HEARING TEST: CPT | Mod: S$GLB,,,

## 2023-06-02 PROCEDURE — 63600175 PHARM REV CODE 636 W HCPCS: Performed by: INTERNAL MEDICINE

## 2023-06-02 PROCEDURE — 93306 TTE W/DOPPLER COMPLETE: CPT

## 2023-06-02 PROCEDURE — 78452 NUCLEAR STRESS - CARDIOLOGY INTERPRETED (CUPID ONLY): ICD-10-PCS | Mod: 26,,, | Performed by: INTERNAL MEDICINE

## 2023-06-02 PROCEDURE — 93017 CV STRESS TEST TRACING ONLY: CPT

## 2023-06-02 PROCEDURE — 99999 PR PBB SHADOW E&M-EST. PATIENT-LVL I: ICD-10-PCS | Mod: PBBFAC,,,

## 2023-06-02 PROCEDURE — 93016 CV STRESS TEST SUPVJ ONLY: CPT | Mod: ,,, | Performed by: INTERNAL MEDICINE

## 2023-06-02 PROCEDURE — 93018 NUCLEAR STRESS - CARDIOLOGY INTERPRETED (CUPID ONLY): ICD-10-PCS | Mod: ,,, | Performed by: INTERNAL MEDICINE

## 2023-06-02 PROCEDURE — 92557 PR COMPREHENSIVE HEARING TEST: ICD-10-PCS | Mod: S$GLB,,,

## 2023-06-02 PROCEDURE — 93016 NUCLEAR STRESS - CARDIOLOGY INTERPRETED (CUPID ONLY): ICD-10-PCS | Mod: ,,, | Performed by: INTERNAL MEDICINE

## 2023-06-02 PROCEDURE — 78452 HT MUSCLE IMAGE SPECT MULT: CPT | Mod: 26,,, | Performed by: INTERNAL MEDICINE

## 2023-06-02 PROCEDURE — 92567 PR TYMPA2METRY: ICD-10-PCS | Mod: S$GLB,,,

## 2023-06-02 RX ORDER — REGADENOSON 0.08 MG/ML
0.4 INJECTION, SOLUTION INTRAVENOUS ONCE
Status: COMPLETED | OUTPATIENT
Start: 2023-06-02 | End: 2023-06-02

## 2023-06-02 RX ADMIN — REGADENOSON 0.4 MG: 0.08 INJECTION, SOLUTION INTRAVENOUS at 09:06

## 2023-06-02 NOTE — Clinical Note
Patient was originally scheduled to see Sticker today for dizziness but apt was rescheduled due to surgery. Mixed HL in the right ear and SNHL in the left ear. Hallpikes Negative- AU. Patient had lightheadedness upon sitting. Discussed dizziness likely not related to ear. She would like to be scheduled to see ENT- also has nose and voice issues she wants to talk about.

## 2023-06-02 NOTE — PROGRESS NOTES
Shireen Felix was seen 06/02/2023 for an audiological evaluation. Previous audiological evaluation from 2016, completed at an outside facility, revealed a moderate conductive hearing loss from 250-2000Hz sloping to a severe to profound mixed hearing loss from 8516-3554 Hz and normal hearing sensitivity from 250-2000 Hz sloping to a moderate to severe sensorineural hearing loss 6713-0326 Hz in the left ear. Patient complains of dizziness and imbalance. Patient reported she fell on May 24th and her cardiologist recommended a vestibular work up. Patient described dizziness as head-spinning. Patient reported a seizure disorder and that her blood pressure has been fluctuating recently. Patient endorsed a gradual decrease in hearing over the past few ears. She has a history of otologic surgery in the right ear.     Otoscopy revealed clear canals with visualization of the tympanic membrane in both ears. Tympanograms were Type B large volume for the right ear and Type A for the left ear. Audiometry revealed a moderate conductive hearing loss through 1000 Hz to a moderate sloping to severe mixed hearing loss for the right ear, and normal hearing sensitivity through 500 Hz sloping to a mild to severe sensorineural hearing loss for the left ear. Speech Reception Thresholds were  50 dBHL for the right ear and 30 dBHL for the left ear. Word recognition scores were good for the right ear and excellent for the left ear.    Right Elham-Hallpike: Negative for BPPV  Left Elham-Hallpike: Negative for BPPV     Patient was counseled on the above findings.    Recommendations:  Consult with ENT  Repeat audiological evaluation in one to two years to monitor hearing, or sooner if needed.  Recommend bilateral amplification.

## 2023-06-02 NOTE — TELEPHONE ENCOUNTER
----- Message from Aamir Beavers, CCC-A sent at 6/2/2023  4:11 PM CDT -----  Patient was originally scheduled to see Sticker today for dizziness but apt was rescheduled due to surgery. Mixed HL in the right ear and SNHL in the left ear. Hallpikes Negative- AU. Patient had lightheadedness upon sitting. Discussed dizziness likely not related to ear. She would like to be scheduled to see ENT- also has nose and voice issues she wants to talk about.

## 2023-06-03 PROBLEM — E83.42 HYPOMAGNESEMIA: Status: ACTIVE | Noted: 2023-06-03

## 2023-06-03 PROBLEM — N18.9 CKD (CHRONIC KIDNEY DISEASE): Status: ACTIVE | Noted: 2023-06-03

## 2023-06-03 PROBLEM — E87.6 HYPOKALEMIA: Status: ACTIVE | Noted: 2023-06-03

## 2023-06-05 NOTE — TELEPHONE ENCOUNTER
We can reschedule in the coming month or two. The hearing test does show that there could be some fluid behind the right ear and at some point one of the providers should take at a look at her ears. Until then she should consistently use flonase nasal spray to help with any inflammation in the back of the nose where the eras connect, so that they can help the ears drain.

## 2023-06-06 LAB
CV STRESS BASE HR: 70 BPM
DIASTOLIC BLOOD PRESSURE: 82 MMHG
NUC REST EJECTION FRACTION: 54
NUC STRESS EJECTION FRACTION: 62 %
OHS CV CPX 85 PERCENT MAX PREDICTED HEART RATE MALE: 117
OHS CV CPX ESTIMATED METS: 1
OHS CV CPX MAX PREDICTED HEART RATE: 137
OHS CV CPX PATIENT IS FEMALE: 1
OHS CV CPX PATIENT IS MALE: 0
OHS CV CPX PEAK DIASTOLIC BLOOD PRESSURE: 81 MMHG
OHS CV CPX PEAK HEAR RATE: 75 BPM
OHS CV CPX PEAK RATE PRESSURE PRODUCT: NORMAL
OHS CV CPX PEAK SYSTOLIC BLOOD PRESSURE: 197 MMHG
OHS CV CPX PERCENT MAX PREDICTED HEART RATE ACHIEVED: 55
OHS CV CPX RATE PRESSURE PRODUCT PRESENTING: NORMAL
STRESS ECHO POST EXERCISE DUR MIN: 0 MINUTES
STRESS ECHO POST EXERCISE DUR SEC: 47 SECONDS
SYSTOLIC BLOOD PRESSURE: 214 MMHG

## 2023-06-07 ENCOUNTER — HOSPITAL ENCOUNTER (OUTPATIENT)
Dept: CARDIOLOGY | Facility: HOSPITAL | Age: 78
Discharge: HOME | End: 2023-06-07
Attending: INTERNAL MEDICINE
Payer: MEDICARE

## 2023-06-07 ENCOUNTER — TELEPHONE (OUTPATIENT)
Dept: PAIN MEDICINE | Facility: CLINIC | Age: 78
End: 2023-06-07
Payer: MEDICARE

## 2023-06-07 VITALS
SYSTOLIC BLOOD PRESSURE: 195 MMHG | HEIGHT: 64 IN | WEIGHT: 120 LBS | WEIGHT: 120 LBS | WEIGHT: 120 LBS | BODY MASS INDEX: 20.49 KG/M2 | SYSTOLIC BLOOD PRESSURE: 195 MMHG | DIASTOLIC BLOOD PRESSURE: 84 MMHG | BODY MASS INDEX: 20.49 KG/M2 | DIASTOLIC BLOOD PRESSURE: 84 MMHG | SYSTOLIC BLOOD PRESSURE: 195 MMHG | HEIGHT: 64 IN | DIASTOLIC BLOOD PRESSURE: 84 MMHG | HEIGHT: 64 IN | BODY MASS INDEX: 20.49 KG/M2

## 2023-06-07 DIAGNOSIS — M79.605 PAIN IN BOTH LOWER EXTREMITIES: ICD-10-CM

## 2023-06-07 DIAGNOSIS — R60.0 LOCALIZED EDEMA: ICD-10-CM

## 2023-06-07 DIAGNOSIS — M79.604 PAIN IN BOTH LOWER EXTREMITIES: ICD-10-CM

## 2023-06-07 DIAGNOSIS — R00.2 PALPITATIONS: ICD-10-CM

## 2023-06-07 LAB
LEFT ABI: 1.15
LEFT ANT TIBIAL SYS PSV: 52 CM/S
LEFT ARM BP: 188 MMHG
LEFT CFA PSV: 151 CM/S
LEFT DORSALIS PEDIS: 224 MMHG
LEFT PERONEAL SYS PSV: 70 CM/S
LEFT POPLITEAL PSV: 72 CM/S
LEFT POST TIBIAL SYS PSV: 76 CM/S
LEFT POSTERIOR TIBIAL: 217 MMHG
LEFT PROFUNDA SYS PSV: 62 CM/S
LEFT SUPER FEMORAL DIST SYS PSV: 81 CM/S
LEFT SUPER FEMORAL MID SYS PSV: 103 CM/S
LEFT SUPER FEMORAL OSTIAL SYS PSV: 90 CM/S
LEFT SUPER FEMORAL PROX SYS PSV: 120 CM/S
LEFT TBI: 0.71
LEFT TIB/PER TRUNK SYS PSV: 72 CM/S
LEFT TOE PRESSURE: 138 MMHG
OHS CV RIGHT ABI LOWER EXTREMITY (NO CALC): 1.16
RIGHT ABI: 1.16
RIGHT ANT TIBIAL SYS PSV: 71 CM/S
RIGHT ARM BP: 195 MMHG
RIGHT CFA PSV: 88 CM/S
RIGHT DORSALIS PEDIS: 226 MMHG
RIGHT PERONEAL SYS PSV: 83 CM/S
RIGHT POPLITEAL PSV: 71 CM/S
RIGHT POST TIBIAL SYS PSV: 75 CM/S
RIGHT POSTERIOR TIBIAL: 219 MMHG
RIGHT PROFUNDA SYS PSV: 72 CM/S
RIGHT SUPER FEMORAL DIST SYS PSV: 120 CM/S
RIGHT SUPER FEMORAL MID SYS PSV: 142 CM/S
RIGHT SUPER FEMORAL OSTIAL SYS PSV: 90 CM/S
RIGHT SUPER FEMORAL PROX SYS PSV: 123 CM/S
RIGHT TBI: 0.79
RIGHT TIB/PER TRUNK SYS PSV: 106 CM/S
RIGHT TOE PRESSURE: 155 MMHG

## 2023-06-07 PROCEDURE — 93227 XTRNL ECG REC<48 HR R&I: CPT | Mod: ,,, | Performed by: INTERNAL MEDICINE

## 2023-06-07 PROCEDURE — 93922 ANKLE BRACHIAL INDICES (ABI): ICD-10-PCS | Mod: 26,,, | Performed by: INTERNAL MEDICINE

## 2023-06-07 PROCEDURE — 93922 UPR/L XTREMITY ART 2 LEVELS: CPT | Mod: 26,,, | Performed by: INTERNAL MEDICINE

## 2023-06-07 PROCEDURE — 93922 UPR/L XTREMITY ART 2 LEVELS: CPT

## 2023-06-07 PROCEDURE — 93970 EXTREMITY STUDY: CPT | Mod: 26,,, | Performed by: INTERNAL MEDICINE

## 2023-06-07 PROCEDURE — 93225 XTRNL ECG REC<48 HRS REC: CPT

## 2023-06-07 PROCEDURE — 93970 CV US DOPPLER VENOUS LEGS BILATERAL (CUPID ONLY): ICD-10-PCS | Mod: 26,,, | Performed by: INTERNAL MEDICINE

## 2023-06-07 PROCEDURE — 93970 EXTREMITY STUDY: CPT

## 2023-06-07 PROCEDURE — 93925 CV US DOPPLER ARTERIAL LEGS BILATERAL (CUPID ONLY): ICD-10-PCS | Mod: 26,,, | Performed by: INTERNAL MEDICINE

## 2023-06-07 PROCEDURE — 93925 LOWER EXTREMITY STUDY: CPT

## 2023-06-07 PROCEDURE — 93925 LOWER EXTREMITY STUDY: CPT | Mod: 26,,, | Performed by: INTERNAL MEDICINE

## 2023-06-07 PROCEDURE — 93227 HOLTER MONITOR - 48 HOUR (CUPID ONLY): ICD-10-PCS | Mod: ,,, | Performed by: INTERNAL MEDICINE

## 2023-06-07 NOTE — TELEPHONE ENCOUNTER
----- Message from Shanelle Maguire sent at 6/7/2023  3:56 PM CDT -----  Contact: Rm,   Pt  Rm is calling to speak with the nurse regarding appt. Reports pt is in extreme pain and needs something sooner than the scheduled 6/27/23 appt. Please give Rm a call back at .[phone]. Thanks KD.     Pt  is requesting to be able to come in this Friday 6/9/23.

## 2023-06-07 NOTE — TELEPHONE ENCOUNTER
Attempt to reach patient to schedule a sooner appointment with one of our pain providers. The patient did not answer. Left voice message on patients voice box to call back at earliest convenience to schedule apt.     Jimi Stockton  Medical Assistant

## 2023-06-08 ENCOUNTER — TELEPHONE (OUTPATIENT)
Dept: PAIN MEDICINE | Facility: CLINIC | Age: 78
End: 2023-06-08
Payer: MEDICARE

## 2023-06-08 NOTE — TELEPHONE ENCOUNTER
Reached out to patient to schedule appointment from messages. Apt has been made.   Pt understand. All questions answered.     Jimi Stockton  Medical Assistant

## 2023-06-08 NOTE — TELEPHONE ENCOUNTER
----- Message from Ina House MA sent at 6/8/2023 11:19 AM CDT -----  Contact: [id number]    ----- Message -----  From: Ken Granados  Sent: 6/8/2023   7:12 AM CDT  To: [facility] Staff    Type:  Patient Returning Call    Who Called:Brother   Who Left Message for Patient:BECK  Does the patient know what this is regarding?:appt   Would the patient rather a call back or a response via [facility]? Call back   Best Call Back Number:[phone]  Additional Information:

## 2023-06-09 ENCOUNTER — OFFICE VISIT (OUTPATIENT)
Dept: PAIN MEDICINE | Facility: CLINIC | Age: 78
End: 2023-06-09
Payer: MEDICARE

## 2023-06-09 DIAGNOSIS — M53.3 SACROILIAC JOINT PAIN: Primary | ICD-10-CM

## 2023-06-09 LAB
OHS CV EVENT MONITOR DAY: 0
OHS CV HOLTER LENGTH DECIMAL HOURS: 45.35
OHS CV HOLTER LENGTH HOURS: 45
OHS CV HOLTER LENGTH MINUTES: 21
OHS CV HOLTER SINUS AVERAGE HR: 86
OHS CV HOLTER SINUS MAX HR: 124
OHS CV HOLTER SINUS MIN HR: 67

## 2023-06-09 PROCEDURE — 1159F PR MEDICATION LIST DOCUMENTED IN MEDICAL RECORD: ICD-10-PCS | Mod: CPTII,95,, | Performed by: PHYSICIAN ASSISTANT

## 2023-06-09 PROCEDURE — 1160F RVW MEDS BY RX/DR IN RCRD: CPT | Mod: CPTII,95,, | Performed by: PHYSICIAN ASSISTANT

## 2023-06-09 PROCEDURE — 1159F MED LIST DOCD IN RCRD: CPT | Mod: CPTII,95,, | Performed by: PHYSICIAN ASSISTANT

## 2023-06-09 PROCEDURE — 1160F PR REVIEW ALL MEDS BY PRESCRIBER/CLIN PHARMACIST DOCUMENTED: ICD-10-PCS | Mod: CPTII,95,, | Performed by: PHYSICIAN ASSISTANT

## 2023-06-09 PROCEDURE — 99214 OFFICE O/P EST MOD 30 MIN: CPT | Mod: 95,,, | Performed by: PHYSICIAN ASSISTANT

## 2023-06-09 PROCEDURE — 99214 PR OFFICE/OUTPT VISIT, EST, LEVL IV, 30-39 MIN: ICD-10-PCS | Mod: 95,,, | Performed by: PHYSICIAN ASSISTANT

## 2023-06-09 RX ORDER — NABUMETONE 500 MG/1
500 TABLET, FILM COATED ORAL 2 TIMES DAILY
Qty: 60 TABLET | Refills: 2 | Status: SHIPPED | OUTPATIENT
Start: 2023-06-09 | End: 2024-01-30

## 2023-06-09 NOTE — PROGRESS NOTES
Established Patient Chronic Pain Clinic Visit    Chief Pain Complaint:  Neck Pain   Lumbar Back Pain     Established Patient - TeleHealth Visit    The patient location is: [place]  The chief complaint leading to consultation is: chronic pain     Visit type: audiovisual    Face to Face time with patient: 10-15 minutes  20 minutes of total time spent on the encounter, which includes face to face time and non-face to face time preparing to see the patient (eg, review of tests), Obtaining and/or reviewing separately obtained history, Documenting clinical information in the electronic or other health record, Independently interpreting results (not separately reported) and communicating results to the patient/family/caregiver, or Care coordination (not separately reported).     Each patient to whom he or she provides medical services by telemedicine is:  (1) informed of the relationship between the physician and patient and the respective role of any other health care provider with respect to management of the patient; and (2) notified that he or she may decline to receive medical services by telemedicine and may withdraw from such care at any time.        Interval History (6/9/2023):  Shireen Felix presents today via telemed for follow-up visit.  Patient was last seen on 05/16/2023. Last injection bilateral L4/5 TFESI on 4/26/23 with 80% relief. Patient reports pain as 10/10 today. She reports pain that began over the past several weeks, became severe 2 days ago. She reports pain is primarily located in her lower lumbosacral region with radiation into her buttocks, right worse than left, and wrapping around her lateral hips. Pain is similar to that prior to SIJ and GT bursa injection, last SIJ injection 11/16/2021 with excellent relief until a few weeks ago when pain returned. She reports in the past she has also received trigger point injections in her lower back that were very helpful.    Interval History (5/16/2023):  Shireen Felix presents today for follow-up visit.  she underwent bilateral L4/5 TFESI on 4/26/23.  The patient reports that she is/was better following the procedure.  she reports 80% pain relief.  The changes lasted 4 weeks so far.  The changes have continued through this visit.  Patient reports pain as 7/10 today. She reports the procedure itself was very painful. Reports she did not feel sedated at all compared to previous procedures with other providers. She is not interested in pursuing additional procedures secondary to the painful procedure.  She reports worsening neck pain with radiation into her right arm down to her hand with cramping and burning.    Interval History (3/9/2023):  Shireen Felix presents today for follow-up visit for CT and EMG review.  Patient was last seen on 2/1/2023. At that visit, the plan was to order updated CT of her cervical and lumbar spine as well as EMG of her bilateral upper extremities. Patient reports pain as 7/10 today.  She reports worsening neck pain with radiation into her left arm down to her hand with cramping and burning.  She also reports worsening symptoms down both legs.  interfering with her ability to walk at times due to significant weakness. Has an appointment with Dr. Navarro on 03/21/2023. Patient fell hit head on concrete 2-11-23, and had a seizure shortly after.  She reports repeated falls due to her legs giving out from underneath her unexpectedly. Referred to Neurology.  She is also currently on antibiotics secondary to having 10 teeth pulled    EMG/NCS BUE 02/24/2023  IMPRESSION  1. ABNORMAL study  2. 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  There is an acute on chronic radiculopathy  of the LEFT C6 nerve root and a subacute on chronic of the RIGHT C4 and C5 nerve roots and a CHRONIC radiculopathy of bilateral C4-T1 nerve roots    CT cervical spine 02/07/2023  FINDINGS:  There is a mild amount of dextroconvex curvature of the cervical spine.  There is reversal of the normal cervical lordosis.  There is grade 1 anterolisthesis of C3 on C4.  There is grade 1 anterolisthesis of C4 on C5.  There is no fracture.  There are mild degenerative changes between C5 and C7.  There is a mild amount of atherosclerosis.  There is moderate partial opacification of the right mastoid air cells.     Impression:     1. There is a mild amount of dextroconvex curvature of the cervical spine.  2. There is reversal of the normal cervical lordosis.  3. There is grade 1 anterolisthesis of C3 on C4.  4. There is grade 1 anterolisthesis of C4 on C5.  5. There are mild degenerative changes between C5 and C7.  6. There is a mild amount of atherosclerosis.  7. There is moderate partial opacification of the right mastoid air cells.  All CT scans at this facility use dose modulation, iterative reconstruction, and/or weight base dosing when appropriate to reduce radiation dose when appropriate to reduce radiation dose to as low as reasonably achievable.     CT lumbar spine 02/07/2023  FINDINGS:  Mild levoscoliosis.     Essentially stable L2 compression fracture.  Chronic appearing deformities of the T12 and L1 vertebral bodies with Schmorl's nodes and height loss.  Stable sclerotic focus in the L5 vertebral body.  No acute fracture.     Multilevel vacuum disc phenomenon from L1/L2 through L5/S1.  Near complete loss of disc space height at the L3/L4 and L4/L5 levels.     Multilevel degenerative changes to include disc bulges, facet arthropathy, and ligamentum flavum hypertrophy.     Discs bulge at the L 1/L2 level on the left may abut the traversing nerve root.     Disc bulge and facet hypertrophy, and ligamentum flavum  hypertrophy contribute to moderate central canal stenosis at the L2/L3 level.     Partially calcified disc bulge at the L 3/L4 level contributes to severe right and moderate left neural foraminal narrowing.  There is also moderate to severe central canal stenosis at the L3/L4 level.     Moderate to severe central canal stenosis at the L4/L5 level posterior disc bulge abuts the L4/L5 right nerve root.  There is severe left neural foraminal narrowing at the L4/L5 level.     Impression:     Text no acute osseous finding.  Moderate to severe degenerative changes of the lumbar spine, as above.     Interval History (1/31/2023):  Shireen Felix presents today for follow-up visit.  Patient was last seen on 10/4/2022. At that visit, the plan was to schedule bilateral L3-5 MBB followed 2 weeks later by XANDER.  She canceled these procedures and reports at this time she is not interested in scheduling.  She reports she has a  and goes to the gym 3-4 times per week which includes walking on a treadmill.   Patient reports pain as 7/10 today. She reports worsening neck pain with radiation into her left arm down to her hand with cramping and burning. This has been severe over the past week. Last cervical CT in 2019, she reports she has sustained multiple falls since then. She also reports worsening symptoms down her left leg, interfering with her ability to walk at times due to significant weakness.      Interval history 10/04/2022  Ms. Felix is a 76-year-old female with past medical history significant for cerebral aneurysm status post craniotomy and repair, cerebral vascular accident, left V1 distribution post herpetic neuralgia, depression, glaucoma, asthma/COPD, coronary artery disease, GERD, nicotine dependence, type 2 diabetes who presents to \[facility]\"" care, previous Dr. Dias and Dr. Fulton patient.  Today patient reports pain in the lower back and legs.  Pain is constant today is rated 7/10.  Patient  reports pain in a bandlike distribution in the lower back which radiates down the posterior aspects of bilateral lower extremities in L5-S1 distribution to mid thigh.  Pain is described as burning and aching in nature.  Pain is exacerbated when moving from sitting to standing and standing and ambulating just a few feet.  Patient does report associated weakness in the lower extremities associated with her pain.  Patient reports she is able to ambulate with assistive device, walker only a few feet before requiring rest.  Pain has been improved with prior procedures, including medial branch block and transforaminal epidural steroid injection.  Patient is interested in pursuing repeat injection.  Of note patient has trialed medicinal marijuana with Dr. Mckeon and reports palpitations side effect.  Patient has discontinued tramadol, tizanidine and gabapentin.  Pain is improved with prednisone which she takes prescribed by her pulmonologist.    Interval history:  Dr. Fulton:  05/10/2021-05/06/2022  Shireen Felix is a 76 y.o. female  who is presenting with a chief complaint of Neck Pain. The patient began experiencing this problem insidiously, and the pain has been gradually worsening over the past 6 month(s). The pain is described as throbbing, cramping, aching and heavy and is located in the bilateral cervical spine. Pain is intermittent and lasts hours. The  pain is nonradiating. The patient rates her pain a 7 out of ten and interferes with activities of daily living a 7 out of ten. Pain is exacerbated by rotation of the cervical spine, and is improved by rest. Patient reports no prior trauma, no prior spinal surgery      This patient is a 74 y.o. female who presents today complaining of the above noted pain/s. The patient describes the pain as follows.  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  She reports approximately 2 years ago she had to sudden deaths in the family which caused very stressful time for her and resulted in shingles rash in the left V1 distribution however she reports having excruciating pain in the left V1 and V2 distributions.  She denies having difficulty with eating food and brushing her teeth on the left side of her face however the left lateral side of her nose gets her excruciating pain.  She has been tried on numerous medications in the past including gabapentin, Lyrica, Valtrex, Celebrex, ibuprofen which have all provided minimal benefit however steroids have been most helpful.  Today she rates her pain as an 4/10 and describes a constant burning sensation the left side of her face in addition to radiation into her bilateral lower extremities, her right shoulder, her left upper extremity occasionally as a pins and needle sensation.  She does report having numbness and weakness in her left lower extremity.  She has been physical therapy which she completed in February of 2019 however this caused most of her low back and leg symptoms to worsen in addition to her post herpetic neuralgia to worsen.  She denies having bowel bladder difficulties.  Her symptoms are worse with activity such as walking in her somewhat improved with rest however she had finds it difficult to get into a comfortable position. She has been using topical lidocaine patches and applying to the left side of her face which does provide significant benefit.  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     Shireen Felix is a 76 y.o. female  who is presenting with a chief complaint of lumbar spine. The patient began experiencing this problem insidiously, and the pain has been gradually worsening over the past 2 year(s). The pain is described as  throbbing, shooting, burning, aching and electrical and is located in the bilateral lumbar spine. Pain is intermittent and lasts hours. The pain radiates to bilateral lower extremities. The patient rates her pain a 7 out of ten and interferes with activities of daily living a 6 out of ten. Pain is exacerbated by flexion of the lumbar spine, and is improved by rest. Patient reports no prior trauma, no prior spinal surgery     Interval HPI 06/03/2020: Dr. Dias:  Ms. Felix returns to clinic for follow-up.  She underwent a T12-L1 transforaminal epidural steroid injection on March 10, 2020 and she reports that this has provided significant pain relief for her which radiated into her right leg prior to the injection.  She does report that his symptoms have returned and the injections worn off her pain is currently rated 7/10.  She was scheduled to undergo bilateral lumbar radiofrequency ablation prior to corona virus however she would like to hold off on that at this time and pursue repeat injection in the low back.  Unfortunately she also reports that she had her wheelchair fall over a few weeks ago and this has resulted in bilateral shoulder pain.  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  She finds that rest does provide some pain relief on activity causes her symptoms to worsen.  She has been able to walk significantly more after her most recent injection in March reported she is able to walk several steps before having to sit rest however this has a vast improvement from prior to the injection.    Interval HPI:  02/12/2020; Dr. Dias  Ms. Felix returns to clinic for follow-up.  She underwent bilateral lumbar medial branch blocks on January 31, 2020 and reports 100% symptomatic pain relief for approximately 1 week and his symptoms slowly began to return.  She continues to have some right-sided lower thoracic  posterior pain which was not completely dressed with the medial branch blocks.  Today she rates her pain as an 8/10 which is located primarily in the axial lumbar spine and the lower right posterior thoracic region.  She denies having numbness weakness in the legs but does continue to have a constant aching and throbbing sensation in the low back.    Interval HPI 01/23/2020: Dr. Dias: Ms. Felix returns to clinic for follow-up.  She reports that she underwent bilateral L4/5 transforaminal epidural steroid injections in November 2019 reports ongoing 80% symptomatic pain relief.  She has also been participating physical therapy which she has found to be very helpful however 4 days ago she started to do some leg raise is in her bed and she felt severe pain in her low back which has not subsided.  She denies having any radiation except for around her flank into her anterior abdomen.  She feels like the pain sometimes comes straight through from her back to her abdomen.  Today she rates her pain as a 10/10 describes it as a constant aching and sharp pain. She has been using a heating pad which is minimally helpful and she has been holding off on physical therapy at this time. She denies having bowel bladder difficulty.     Interval History: Patient was seen on 8/4/19. At that time she underwent bilateral L3/4 TF XANDER.  The patient reports that she is/was better following the procedure.  she reports 75% pain relief.  She had a fall, then pain increased. She is currently living at [facility]. She is doing at home therapy there, which is helping. She is in wheelchair now but hopes to transition to walker soon.  After the fall, she was not able to get out of the bed.      Initial History of Present Illness:  Dr. Dias  This patient is a 74 y.o. female who presents today complaining of the above noted pain/s. The patient describes the pain as follows.  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  She reports approximately 2 years ago she had to sudden deaths in the family which caused very stressful time for her and resulted in shingles rash in the left V1 distribution however she reports having excruciating pain in the left V1 and V2 distributions.  She denies having difficulty with eating food and brushing her teeth on the left side of her face however the left lateral side of her nose gets her excruciating pain.  She has been tried on numerous medications in the past including gabapentin, Lyrica, Valtrex, Celebrex, ibuprofen which have all provided minimal benefit however steroids have been most helpful.  Today she rates her pain as an 8/10 and describes a constant burning sensation the left side of her face in addition to radiation into her bilateral lower extremities, her right shoulder, her left upper extremity occasionally as a pins and needle sensation.  She does report having numbness and weakness in her left lower extremity.  She has been physical therapy which she completed in February of 2019 however this caused most of her low back and leg symptoms to worsen in addition to her post herpetic neuralgia to worsen.  She denies having bowel bladder difficulties.  Her symptoms are worse with activity such as walking in her somewhat improved with rest however she had finds it difficult to get into a comfortable position. She has been using topical lidocaine patches and applying to the left side of her face which does provide significant benefit.  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    - pertinent negatives: No fever, No chills, No weight loss, No bladder dysfunction, No bowel dysfunction, No saddle anesthesia  - pertinent positives: generalized  nonspecific Lower Extremity weakness bilaterally    - medications, other therapies tried (physical therapy, injections):     >> NSAIDs, Tylenol, Tramadol, Norco, gabapentin, lyrica, flexeril and medrol dose pack    >> Has previously undergone Physical Therapy      Pain injections:  -bilateral L4/5 TFESI on 4/26/23 with 80% relief  -11/16/2021: Right-sided SI joint and greater trochanteric bursa injection; Dr. Fulton  -07/09/2020: Bilateral glenohumeral joint injection; Dr. Dias  -06/19/2020: Right-sided T12/L1 transforaminal epidural steroid injection; Dr. Dias  -03/10/2020:  T12/L1 transforaminal epidural steroid injection; Dr. Dias  -01/31/2020: Bilateral L3-5 medial branch blocks; Dr. Dias  -11/12/2019: Bilateral L4/5 transforaminal epidural steroid injection; Dr. Dias      Imaging / Labs / Studies (reviewed on 6/9/2023):  02/06/20    CT Lumbar Spine Without Contrast    Narrative  EXAMINATION:  CT LUMBAR SPINE WITHOUT CONTRAST    CLINICAL HISTORY:  Low back painLow back pain, >6wks conservative tx, persistent-progressive sx, surgical candidate;    FINDINGS:  The lumbar vertebral bodies demonstrate adequate alignment.Disc space narrowing from T11 through S1.No acute fractures are identified.    T11-T12: Disc space narrowing.  Broad-based disc bulge.  Old vertebral endplate fracture of T12.    T12-L1: Small disc protrusion paramedian to the right side.  Degenerative changes of the vertebral endplates and facet joints.    L1-L2: Old fracture of the inferior endplate of L1.  Mild posterior displacement of a fracture fragment minimally narrowing the AP diameter of the canal.  Gas in the disc space consistent with degenerating disc material.    L2-L3: Circumferential disc bulge.  Bilateral degenerative changes of the facets.  Hypertrophy of the ligamentum flavum.  Moderate to severe central spinal stenosis.  Bony neural foraminal narrowing without definite nerve root impingement.    L3-L4: Circumferential disc  bulge.  Bilateral degenerative changes of the facets.  Degenerative changes of the vertebral endplates.  Severe central spinal stenosis.  Hypertrophy of the ligamentum flavum and degenerative changes of the facets.    L4-L5: Circumferential disc bulge.  Severe central spinal stenosis.  Bilateral degenerative changes of the facets.  Bilateral bony neural foraminal narrowing and possible bilateral L4 nerve root impingement.    L5-S1: Circumferential disc bulge.  Gas formation within the disc.  Moderate central spinal stenosis.  Bilateral degenerative changes of the facets.  Bilateral bony neural foraminal narrowing with possible bilateral L5 nerve root impingement.    Atherosclerotic abdominal aorta without aneurysmal dilatation.     07/31/19    CT Cervical Spine Without Contrast    FINDINGS:  Osteopenia.  Grade 2 degenerative spondylolisthesis at C3-C4.  Grade 1 degenerative spondylolisthesis at C4-C5.  Vertebral body height is normal.  No acute fractures. No prevertebral soft tissue swelling. Atlanto-occipital articulation is normal.  Congenitally patulous spinal canal.  No significant spinal stenosis.  Moderate left foraminal stenosis at C3-C4.    01/23/20    X-Ray Lumbar Spine Ap And Lateral  FINDINGS:  Levoscoliosis present.  Vertebral body heights stable.  Alignment unchanged without spondylolisthesis.  Similar appearing multilevel degenerative disc height loss and osteophyte findings noted.  Multilevel facet degenerative findings remain.  Aorta iliac atherosclerotic calcification.  Colonic constipation findings present.    6/04/19    X-Ray Cervical Spine AP And Lateral    FINDINGS:  Reversal normal C-spine curvature noted on the lateral view with visualization to the C7-T1 level.  Minimal anterior subluxation C4 on C5 noted.  There is multilevel marginal spurring and varying degrees of loss of disc height throughout the C-spine.  No acute fracture identified in prevertebral soft tissues, C1-2 articulation and  odontoid appear within normal limits allowing for positioning.  Minimal vascular calcification identified on the left in the area of the carotid vessels.    Review of Systems:  CONSTITUTIONAL: patient denies any fever, chills, or weight loss  SKIN: patient denies any rash or itching  RESPIRATORY: patient denies having any shortness of breath  GASTROINTESTINAL: patient denies having any diarrhea, constipation, or bowel incontinence  GENITOURINARY: patient denies having any abnormal bladder function    MUSCULOSKELETAL:  - patient complains of the above noted pain/s (see chief pain complaint)    NEUROLOGICAL:   - pain as above  - strength in Upper and Lower extremities is decreased, BILATERALLY  - sensation in Upper extremities is intact, BILATERALLY  - patient denies any loss of bowel or bladder control      PSYCHIATRIC: patient denies any change in mood    Other:  All other systems reviewed and are negative      Physical Exam:  LMP  (LMP Unknown)  (reviewed on 6/9/2023)  Physical Exam    Telemedicine Exam  There were no vitals filed for this visit.  There is no height or weight on file to calculate BMI.   (reviewed on 6/9/2023)     GENERAL: Well appearing, in no acute distress, alert and oriented x3.  Cooperative.  PSYCH:  Mood and affect appropriate.  SKIN: Skin color & texture with no obvious abnormalities.    HEAD/FACE:  Normocephalic, atraumatic.    PULM:  No difficulty breathing. No nasal flaring. No obvious wheezing.  EXTREMITIES: No obvious deformities. Moving all extremities well, appears to have symmetric strength throughout.  MUSCULOSKELETAL: No obvious atrophy abnormalities are noted.   SIJ testing performed by patient with practitioner guidance:  - TTP over SI joint: Present bilaterally  - Madhavi's/ Hank's: Positive , right worse than left  -GT bursa TTP bilaterally    NEURO: No obvious neurologic deficit.   GAIT: sitting.     Physical Exam: last in clinic visit:      GENERAL: Well appearing, in no  acute distress, alert and oriented x3.  PSYCH:  Mood and affect appropriate.  SKIN: Skin color, texture, turgor normal, no rashes or lesions.  HEAD/FACE:  Normocephalic, bruising along right lower jaw, Cranial nerves grossly intact.    CV: RRR with palpation of the radial artery.  PULM: No evidence of respiratory difficulty, symmetric chest rise.  GI:  Soft and non-tender.  Neck: TTP along cervical facet joints, positive facet loading, spurlings positive bilaterally, pain with flexion, extension, and rotation - right greater than left  BACK: Straight leg raising in the sitting and supine positions is negative to radicular pain. pain to palpation over the facet joints of the lumbar spine or spinous processes.  Reduced range of motion with pain reproduction   EXTREMITIES:  Peripheral joint range of motion is reduced with pain with obvious instability in bilateral lower extremities.  No deformities, edema, or skin discoloration. Good capillary refill.  MUSCULOSKELETAL:  Unable to stand on heels and toes  hip, and knee provocative maneuvers are negative.  There is no pain with palpation over the sacroiliac joints bilaterally.  Gaenslen's, Distraction/Compression and  FABERs test is negative.  Facet loading test is positive bilaterally.   Bilateral upper and lower extremity strength is normal and symmetric.  No atrophy or tone abnormalities are noted.    RIGHT Lower extremity: Hip flexion 5/5, Hip Abduction 5/5, Hip Adduction 5/5, Knee extension 5/5, Knee flexion 5/5, Ankle dorsiflexion5/5, Extensor hallucis longus 5/5, Ankle plantarflexion 5/5  LEFT Lower extremity:  Hip flexion 5/5, Hip Abduction 5/5,Hip Adduction 5/5, Knee extension 5/5, Knee flexion 5/5, Ankle dorsiflexion 5/5, Extensor hallucis longus 5/5, Ankle plantarflexion 5/5  -Normal testing knee (patellar) jerk and ankle (achilles) jerk    NEURO: Bilateral upper and lower extremity coordination and muscle stretch reflexes are physiologic and symmetric. No  loss of sensation is noted.  GAIT:  Patient presents in wheelchair today.  Antalgic gait.      Assessment:    Shireen Felix is a 77 y.o. year old female who is presenting with   Encounter Diagnosis   Name Primary?    Sacroiliac joint pain Yes                 Plan:    1. Interventional: Schedule bilateral SIJ + GT bursa injection for sacroiliitis and bursitis, she reports previous injection offered significant relief x 2 years -- NEEDS ADDITIONAL SEDATION  Return to clinic next week for TPIs in lower back    -bilateral L4/5 TFESI on 4/26/23 with 80% relief    Refer to Neurosurgery if no improvement with this injection  Anticoagulation:  Yes, Plavix, does not need to pause for XANDER      2. Pharmacologic:   Continue Gabapentin 100 mg BID per outside provider  Continue tizanidine 4-8 mg prn pain  Begin Nabumetone 500 mg BID for inflammation and pain    3. Rehabilitative:   -Continue activities as tolerated    4. Diagnostic:  CT of cervical spine and lumbar spine reviewed.  Lumbar CT demonstrates severe spinal stenosis at L4-5 and L5-S1 levels.  EMG of bilateral upper extremities demonstrates severe carpal tunnel syndrome and cervical radiculopathy    5. Follow up: Next week for TPI, then 4 weeks after injection    Alanis Peres PA-C          The above plan and management options were discussed at length with patient. Patient is in agreement with the above and verbalized understanding.    - I discussed the goals of interventional chronic pain management with the patient on today's visit. We discussed a multimodal and systematic approach to pain.  This includes diagnostic and therapeutic injections, adjuvant pharmacologic treatment, physical therapy, and at times psychiatry.  I emphasized the importance of regular exercise, core strengthening and stretching, diet and weight loss as a cornerstone of long-term pain management.    - This condition does not require this patient to take time off of work, and the  primary goal of our Pain Management services is to improve the patient's functional capacity.  - Patient Questions: Answered all of the patient's questions regarding diagnoses, therapy, treatment and next steps    Disclaimer:  This note may have been prepared using voice recognition software, it may have not been extensively proofed, as such there could be errors within the text such as sound alike errors.

## 2023-06-12 ENCOUNTER — TELEPHONE (OUTPATIENT)
Dept: CARDIOLOGY | Facility: CLINIC | Age: 78
End: 2023-06-12
Payer: MEDICARE

## 2023-06-12 ENCOUNTER — TELEPHONE (OUTPATIENT)
Dept: PAIN MEDICINE | Facility: CLINIC | Age: 78
End: 2023-06-12
Payer: MEDICARE

## 2023-06-12 DIAGNOSIS — I10 ESSENTIAL HYPERTENSION: ICD-10-CM

## 2023-06-12 RX ORDER — ISOSORBIDE MONONITRATE 60 MG/1
60 TABLET, EXTENDED RELEASE ORAL NIGHTLY
Qty: 90 TABLET | Refills: 1 | Status: SHIPPED | OUTPATIENT
Start: 2023-06-12 | End: 2024-06-11

## 2023-06-12 RX ORDER — METOPROLOL TARTRATE 100 MG/1
100 TABLET ORAL 2 TIMES DAILY
Qty: 180 TABLET | Refills: 2 | Status: SHIPPED | OUTPATIENT
Start: 2023-06-12 | End: 2023-06-14

## 2023-06-12 NOTE — TELEPHONE ENCOUNTER
----- Message from Shanelle Maguire sent at 6/12/2023  3:56 PM CDT -----  Contact: chantale Iyer  Pt is calling to speak with the nurse regarding questions and concerns about scheduling an appt discussed during virtual visit. Please give pt a call back at [phone]. Thanks FLACO

## 2023-06-12 NOTE — TELEPHONE ENCOUNTER
Informed pt     OK I will refill this but I also increased her Imdur to 60mg during evening.        Pt verbalized understanding with no questions or concerns

## 2023-06-13 ENCOUNTER — TELEPHONE (OUTPATIENT)
Dept: PAIN MEDICINE | Facility: CLINIC | Age: 78
End: 2023-06-13
Payer: MEDICARE

## 2023-06-13 NOTE — TELEPHONE ENCOUNTER
Called patient and informed her that her appt for TPI is at 9:40 am  at the [facility].  Patient verbalized understanding

## 2023-06-13 NOTE — TELEPHONE ENCOUNTER
----- Message from Evette Elida sent at 6/13/2023  7:37 AM CDT -----  Contact: pt  Pt is calling in regard to her having an appt on this Wednesday, 6/14 to see Alanis Peres, but there is no appt in her chart.  Please call pt to advise at [phone] fer/darlene

## 2023-06-13 NOTE — PROGRESS NOTES
Subjective:   Patient ID:  Shireen Felix is a 77 y.o. female who presents for evaluation of Back Pain, Hypertension, Fatigue, and Dizziness      [id number] y.o. female pt with HFPEF, CAD PCI x1 done in [place], CVI, prior CVA, COPD, seizure disorder, DM type II, HTN, and ANDREI here for follow up CV eval. Last seen by Dr. Dye 5/17/23 for HTN and Imdur was increased, HCTZ 25mg PRN, lasix PRN, was also referred to Neuro, ENT, PMR, Nephro, Heme/Onc, pulm, pain management. Here today for follow up for BP. Pt reports feeling fatigue, weakness. Denies any CP, angina or anginal equivalent at this time.      Home BP logs 170s-200s/90-100s    Echo 6/2/23 nml heart function  Nuclear stress nml    Past Medical History:   Diagnosis Date    Abnormal ECG 8/5/2019    Aneurysm     Anticoagulant long-term use     Arthritis     CAD in native artery 8/5/2019    COPD (chronic obstructive pulmonary disease)     Diabetes mellitus     Fall 10/10/2019    Formatting of this note might be different from the original. Found sitting on floor next to bed last night Mild confusion today Does not recall falling or how she ended up on floor UA today Labs yesterday unremarkable    Glaucoma     Hypertension     Seizures     Shingles 05/27/2017    Stroke        Past Surgical History:   Procedure Laterality Date    BRAIN SURGERY      CARDIAC CATHETERIZATION      CORONARY ANGIOPLASTY      EPIDURAL STEROID INJECTION INTO LUMBAR SPINE Bilateral 11/12/2019    Procedure: TF XANDER L4/5;  Surgeon: Desean Dias MD;  Location: [facility];  Service: Pain Management;  Laterality: Bilateral;    HYSTERECTOMY      INJECTION OF ANESTHETIC AGENT AROUND MEDIAL BRANCH NERVES INNERVATING LUMBAR FACET JOINT Bilateral 1/31/2020    Procedure: Bilateral L3-5 MBB;  Surgeon: Desean Dias MD;  Location: [facility];  Service: Pain Management;  Laterality: Bilateral;    INJECTION OF ANESTHETIC AGENT INTO SACROILIAC JOINT Right 11/16/2021    Procedure: Right BLOCK,  SACROILIAC JOINT Right GTB with RN IV sedation;  Surgeon: Yao Fulton MD;  Location: [facility];  Service: Pain Management;  Laterality: Right;    INJECTION OF JOINT Bilateral 2020    Procedure: Bilateral shoulder GH Joint injection with local;  Surgeon: Desean Dias MD;  Location: [facility];  Service: Pain Management;  Laterality: Bilateral;    SELECTIVE INJECTION OF ANESTHETIC AGENT AROUND LUMBAR SPINAL NERVE ROOT BY TRANSFORAMINAL APPROACH Bilateral 2023    Procedure: Bilateral L4/5 TF XANDER RN IV Sedation;  Surgeon: Fracnisco Oshea MD;  Location: [facility];  Service: Pain Management;  Laterality: Bilateral;    TRANSFORAMINAL EPIDURAL INJECTION OF STEROID Bilateral 2019    Procedure: Bilateral L3/4 Transforaminal Epidural Steroid Injection;  Surgeon: Desean Dias MD;  Location: [facility];  Service: Pain Management;  Laterality: Bilateral;    TRANSFORAMINAL EPIDURAL INJECTION OF STEROID Bilateral 3/10/2020    Procedure: Right T12/L1 TF XANDER with local;  Surgeon: Desean Dias MD;  Location: [facility];  Service: Pain Management;  Laterality: Bilateral;    TRANSFORAMINAL EPIDURAL INJECTION OF STEROID Right 2020    Procedure: Right T12/L1 TFESI Covid day of procedure;  Surgeon: Desean Dias MD;  Location: [facility];  Service: Pain Management;  Laterality: Right;       Social History     Tobacco Use    Smoking status: Former     Packs/day: 1.00     Years: 42.00     Pack years: 42.00     Types: Cigarettes     Start date:      Quit date: 2004     Years since quittin.1    Smokeless tobacco: Never   Substance Use Topics    Alcohol use: No    Drug use: Never       Family History   Problem Relation Age of Onset    No Known Problems Mother     No Known Problems Father        Current Outpatient Medications on File Prior to Visit   Medication Sig Dispense Refill    albuterol (PROVENTIL) 2.5 mg /3 mL (0.083 %) nebulizer solution Take 2.5 mg by nebulization  every 6 (six) hours as needed for Wheezing. Rescue      albuterol (PROVENTIL/VENTOLIN HFA) 90 mcg/actuation inhaler INHALE TWO PUFFS INTO THE LUNGS EVERY 4 HOURS AS NEEDED 18 g 11    amLODIPine (NORVASC) 10 MG tablet TAKE 1 TABLET BY MOUTH ONCE A DAY 90 tablet 3    atorvastatin (LIPITOR) 40 MG tablet TAKE 1 TABLET BY MOUTH ONCE DAILY 90 tablet 1    cholecalciferol, vitamin D3, 1,250 mcg (50,000 unit) capsule Take 1 capsule (50,000 Units total) by mouth every 7 days. 12 capsule 0    clopidogreL (PLAVIX) 75 mg tablet Take 1 tablet (75 mg total) by mouth once daily. 90 tablet 3    DALIRESP 500 mcg Tab TAKE 1 TABLET BY MOUTH ONCE DAILY 90 tablet 3    denosumab (PROLIA) 60 mg/mL Syrg every 6 (six) months.      diltiaZEM (DILACOR XR) 120 MG CDCR Take 1 capsule (120 mg total) by mouth once daily. 30 capsule 3    dorzolamide-timolol 2-0.5% (COSOPT) 22.3-6.8 mg/mL ophthalmic solution Place 1 drop into both eyes every 12 (twelve) hours. 10 mL 12    EScitalopram oxalate (LEXAPRO) 20 MG tablet TAKE 1 TABLET BY MOUTH ONCE DAILY 90 tablet 1    fluticasone propionate (FLONASE) 50 mcg/actuation nasal spray USE 2 SPRAYS INTO EACH NOSTRIL ONCE A DAY AT 6AM AS DIRECTED 16 g 11    fluticasone-umeclidin-vilanter (TRELEGY ELLIPTA) 200-62.5-25 mcg inhaler INHALE 1 PUFF BY MOUTH ONCE A DAY 60 each 11    furosemide (LASIX) 20 MG tablet Take 1 tablet (20 mg total) by mouth daily as needed (Take next 3 days for more fluid removal from CHF). Take next 3 days for more fluid removal from CHF 60 tablet 3    hydroCHLOROthiazide (HYDRODIURIL) 25 MG tablet TAKE 1 TABLET BY MOUTH ONCE DAILY AS NEEDED 90 tablet 3    ipratropium (ATROVENT) 42 mcg (0.06 %) nasal spray USE 2 SPRAYS INTO EACH NOSTRIL FOUR TIMES DAILY 15 mL 11    isosorbide mononitrate (IMDUR) 60 MG 24 hr tablet Take 1 tablet (60 mg total) by mouth every evening. 90 tablet 1    ketoconazole (NIZORAL) 2 % cream Apply topically 2 (two) times daily. For dry flaky patches of ear. 30 g 1     latanoprost 0.005 % ophthalmic solution Place 1 drop into both eyes every evening. 2.5 mL 12    levETIRAcetam (KEPPRA) 500 MG Tab Take 1 tablet (500 mg total) by mouth 2 (two) times daily. 180 tablet 3    magnesium oxide (MAG-OX) 400 mg (241.3 mg magnesium) tablet Take 1 tablet (400 mg total) by mouth once daily. 90 tablet 1    metFORMIN (GLUCOPHAGE) 500 MG tablet TAKE 1 TABLET BY MOUTH ONCE DAILY 90 tablet 3    metoprolol tartrate (LOPRESSOR) 100 MG tablet Take 1 tablet (100 mg total) by mouth 2 (two) times daily. 180 tablet 2    mirabegron (MYRBETRIQ) 50 mg Tb24 TAKE 1 TABLET BY MOUTH ONCE DAILY 30 tablet 0    montelukast (SINGULAIR) 10 mg tablet TAKE 1 TABLET BY MOUTH ONCE A DAY 90 tablet 3    nabumetone (RELAFEN) 500 MG tablet Take 1 tablet (500 mg total) by mouth 2 (two) times daily. 60 tablet 2    nitroGLYCERIN (NITROSTAT) 0.4 MG SL tablet DISSOLVE 1 TABLET UNDER TONGUE EVERY 5 MINUTES FOR CHEST PAIN (MAY TAKE UP TO 3 DOSES THEN SEEK MEDICAL ATTENTION) 25 tablet 0    omeprazole (PRILOSEC) 40 MG capsule TAKE 1 CAPSULE BY MOUTH ONCE DAILY IN THE MORNING 90 capsule 3    potassium chloride SA (K-DUR,KLOR-CON) 20 MEQ tablet Take 1 tablet (20 mEq total) by mouth once daily. Take extra dose with Lasix - fluid pill 90 tablet 1    ranolazine (RANEXA) 1,000 mg Tb12 Take 1 tablet (1,000 mg total) by mouth 2 (two) times daily. 180 tablet 3    tiZANidine (ZANAFLEX) 4 MG tablet Take 1-1.5 tablets (4-6 mg total) by mouth 2 (two) times daily as needed (muscle spasms). May cause drowsiness. 90 tablet 4    triamcinolone acetonide 0.025% (KENALOG) 0.025 % cream Apply topically 2 (two) times daily. PRN rash and itching of ear. Mild steroid cream. 30 g 0    valsartan (DIOVAN) 320 MG tablet Take 1 tablet (320 mg total) by mouth once daily. 30 tablet 3    levocetirizine (XYZAL) 5 MG tablet Take 1 tablet (5 mg total) by mouth every evening. 30 tablet 11    methylPREDNISolone (MEDROL DOSEPACK) 4 mg tablet use as directed (Patient not  taking: Reported on 6/14/2023) 1 each 0    nortriptyline (PAMELOR) 25 MG capsule Take 1 capsule (25 mg total) by mouth every evening. 90 capsule 0     No current facility-administered medications on file prior to visit.      Wt Readings from Last 3 Encounters:   06/14/23 55.6 kg (122 lb 9.2 oz)   06/07/23 54.4 kg (120 lb)   06/07/23 54.4 kg (120 lb)     Temp Readings from Last 3 Encounters:   05/17/23 97.1 °F (36.2 °C)   04/26/23 98 °F (36.7 °C) (Temporal)   03/03/23 97.3 °F (36.3 °C) (Tympanic)     BP Readings from Last 3 Encounters:   06/14/23 (!) 140/54   06/07/23 (!) 195/84   06/07/23 (!) 195/84     Pulse Readings from Last 3 Encounters:   06/14/23 (!) 58   06/01/23 73   05/22/23 101        Review of Systems   Constitutional: Positive for malaise/fatigue.   HENT: Negative.     Eyes: Negative.    Cardiovascular: Negative.    Respiratory: Negative.     Skin: Negative.    Musculoskeletal: Negative.    Gastrointestinal: Negative.    Genitourinary: Negative.    Neurological:  Positive for dizziness, loss of balance and weakness.   Psychiatric/Behavioral: Negative.       Objective:   Physical Exam  Vitals and nursing note reviewed.   Constitutional:       Appearance: Normal appearance.   HENT:      Head: Normocephalic.   Eyes:      Pupils: Pupils are equal, round, and reactive to light.   Cardiovascular:      Rate and Rhythm: Normal rate and regular rhythm.      Heart sounds: S1 normal and S2 normal. Murmur heard.     No S3 or S4 sounds.   Pulmonary:      Effort: Pulmonary effort is normal.      Breath sounds: Normal breath sounds.   Abdominal:      General: Bowel sounds are normal.      Palpations: Abdomen is soft.   Musculoskeletal:         General: Normal range of motion.      Cervical back: Normal range of motion.   Skin:     Capillary Refill: Capillary refill takes less than 2 seconds.   Neurological:      General: No focal deficit present.      Mental Status: She is alert and oriented to person, place, and time.       Motor: Weakness present.      Comments: Uses wheelchair occasionally    Psychiatric:         Mood and Affect: Mood normal.         Behavior: Behavior normal.         Thought Content: Thought content normal.       Lab Results   Component Value Date    CHOL 159 10/26/2022    CHOL 162 05/03/2022    CHOL 134 02/03/2022     Lab Results   Component Value Date    HDL 65 10/26/2022    HDL 71 05/03/2022    HDL 59 02/03/2022     Lab Results   Component Value Date    LDLCALC 79.8 10/26/2022    LDLCALC 81.4 05/03/2022    LDLCALC 58.0 (L) 02/03/2022     Lab Results   Component Value Date    TRIG 71 10/26/2022    TRIG 48 05/03/2022    TRIG 85 02/03/2022     Lab Results   Component Value Date    CHOLHDL 40.9 10/26/2022    CHOLHDL 43.8 05/03/2022    CHOLHDL 44.0 02/03/2022       Chemistry        Component Value Date/Time     05/22/2023 0843    K 3.4 (L) 05/22/2023 0843     05/22/2023 0843    CO2 23 05/22/2023 0843    BUN 11 05/22/2023 0843    CREATININE 0.8 05/22/2023 0843     05/22/2023 0843        Component Value Date/Time    CALCIUM 8.6 (L) 05/22/2023 0843    ALKPHOS 57 05/22/2023 0843    AST 16 05/22/2023 0843    ALT 14 05/22/2023 0843    BILITOT 0.3 05/22/2023 0843    ESTGFRAFRICA >60 06/02/2022 1549    EGFRNONAA >60 06/02/2022 1549          Lab Results   Component Value Date    TSH 1.249 08/09/2021     Lab Results   Component Value Date    INR 1.0 11/05/2020    INR 1.0 08/20/2020    INR 1.0 08/05/2020     @RESUFAST(WBC,HGB,HCT,MCV,PLT)  @LABRCNTIP(BNP,BNPTRIAGEBLO)@  CrCl cannot be calculated (Patient's most recent lab result is older than the maximum 7 days allowed.).     Results for orders placed during the hospital encounter of 06/02/23    Echo    Interpretation Summary  · The left ventricle is normal in size with concentric hypertrophy and low normal systolic function.  · The estimated ejection fraction is 50%.  · Normal left ventricular diastolic function.  · There is abnormal septal wall motion  consistent with left bundle branch block.  · Normal right ventricular size with normal right ventricular systolic function.  · Normal central venous pressure (3 mmHg).  · The estimated PA systolic pressure is 30 mmHg.  · Mild mitral regurgitation.     Results for orders placed during the hospital encounter of 06/02/23    Nuclear Stress - Cardiology Interpreted    Interpretation Summary    Normal myocardial perfusion scan. There is no evidence of myocardial ischemia or infarction.    The gated perfusion images showed an ejection fraction of 54% at rest. The gated perfusion images showed an ejection fraction of 62% post stress.    There is normal wall motion at rest and post stress.    LV cavity size is normal at rest and normal at stress.    The ECG portion of the study is negative for ischemia.    The patient reported no chest pain during the stress test.     Assessment:      1. Primary hypertension    2. Coronary artery disease of native artery of native heart with stable angina pectoris    3. Mixed hyperlipidemia    4. Orthostatic hypotension    5. History of cerebral aneurysm    6. Fluctuating blood pressure    7. Chronic diastolic heart failure    8. History of tobacco abuse        Plan:   Primary hypertension    Coronary artery disease of native artery of native heart with stable angina pectoris    Mixed hyperlipidemia    Orthostatic hypotension    History of cerebral aneurysm    Fluctuating blood pressure    Chronic diastolic heart failure    History of tobacco abuse    Cont amlodipine, diltiazem, HCTZ, Imdur- HTN  Ranexa qd/ SL nitro PRN- chest pains  Statin, plavix- HLD/CAD/CVA      Renal stenosis US  Carotid US  Added Coreg 12.5mg BID  Cont BP log  Bring BP cuff in next visit    RTC 2 week BP check    Courtney Guillot, FNP-C [facility], Cardiology

## 2023-06-14 ENCOUNTER — OFFICE VISIT (OUTPATIENT)
Dept: PAIN MEDICINE | Facility: CLINIC | Age: 78
End: 2023-06-14
Payer: MEDICARE

## 2023-06-14 ENCOUNTER — OFFICE VISIT (OUTPATIENT)
Dept: CARDIOLOGY | Facility: CLINIC | Age: 78
End: 2023-06-14
Payer: MEDICARE

## 2023-06-14 VITALS
DIASTOLIC BLOOD PRESSURE: 70 MMHG | HEIGHT: 64 IN | WEIGHT: 122.56 LBS | SYSTOLIC BLOOD PRESSURE: 145 MMHG | BODY MASS INDEX: 20.92 KG/M2 | HEART RATE: 71 BPM

## 2023-06-14 VITALS
OXYGEN SATURATION: 95 % | HEIGHT: 64 IN | BODY MASS INDEX: 20.92 KG/M2 | DIASTOLIC BLOOD PRESSURE: 54 MMHG | WEIGHT: 122.56 LBS | SYSTOLIC BLOOD PRESSURE: 140 MMHG | HEART RATE: 58 BPM

## 2023-06-14 DIAGNOSIS — M54.59 OTHER LOW BACK PAIN: ICD-10-CM

## 2023-06-14 DIAGNOSIS — M54.16 LUMBAR RADICULOPATHY, CHRONIC: ICD-10-CM

## 2023-06-14 DIAGNOSIS — Z87.891 HISTORY OF TOBACCO ABUSE: ICD-10-CM

## 2023-06-14 DIAGNOSIS — I50.32 CHRONIC DIASTOLIC HEART FAILURE: ICD-10-CM

## 2023-06-14 DIAGNOSIS — I95.1 ORTHOSTATIC HYPOTENSION: ICD-10-CM

## 2023-06-14 DIAGNOSIS — M48.02 SPINAL STENOSIS, CERVICAL REGION: Primary | ICD-10-CM

## 2023-06-14 DIAGNOSIS — I25.118 CORONARY ARTERY DISEASE OF NATIVE ARTERY OF NATIVE HEART WITH STABLE ANGINA PECTORIS: Primary | ICD-10-CM

## 2023-06-14 DIAGNOSIS — I99.8 FLUCTUATING BLOOD PRESSURE: ICD-10-CM

## 2023-06-14 DIAGNOSIS — I10 PRIMARY HYPERTENSION: ICD-10-CM

## 2023-06-14 DIAGNOSIS — E78.2 MIXED HYPERLIPIDEMIA: ICD-10-CM

## 2023-06-14 DIAGNOSIS — Z86.79 HISTORY OF CEREBRAL ANEURYSM: ICD-10-CM

## 2023-06-14 PROCEDURE — 1100F PR PT FALLS ASSESS DOC 2+ FALLS/FALL W/INJURY/YR: ICD-10-PCS | Mod: CPTII,S$GLB,, | Performed by: PHYSICIAN ASSISTANT

## 2023-06-14 PROCEDURE — 1100F PTFALLS ASSESS-DOCD GE2>/YR: CPT | Mod: CPTII,S$GLB,, | Performed by: PHYSICIAN ASSISTANT

## 2023-06-14 PROCEDURE — 3288F FALL RISK ASSESSMENT DOCD: CPT | Mod: CPTII,S$GLB,, | Performed by: PHYSICIAN ASSISTANT

## 2023-06-14 PROCEDURE — 1159F PR MEDICATION LIST DOCUMENTED IN MEDICAL RECORD: ICD-10-PCS | Mod: CPTII,S$GLB,,

## 2023-06-14 PROCEDURE — 1125F PR PAIN SEVERITY QUANTIFIED, PAIN PRESENT: ICD-10-PCS | Mod: CPTII,S$GLB,,

## 2023-06-14 PROCEDURE — 1159F PR MEDICATION LIST DOCUMENTED IN MEDICAL RECORD: ICD-10-PCS | Mod: CPTII,S$GLB,, | Performed by: PHYSICIAN ASSISTANT

## 2023-06-14 PROCEDURE — 3077F SYST BP >= 140 MM HG: CPT | Mod: CPTII,S$GLB,, | Performed by: PHYSICIAN ASSISTANT

## 2023-06-14 PROCEDURE — 3288F PR FALLS RISK ASSESSMENT DOCUMENTED: ICD-10-PCS | Mod: CPTII,S$GLB,, | Performed by: PHYSICIAN ASSISTANT

## 2023-06-14 PROCEDURE — 3288F PR FALLS RISK ASSESSMENT DOCUMENTED: ICD-10-PCS | Mod: CPTII,S$GLB,,

## 2023-06-14 PROCEDURE — 1101F PR PT FALLS ASSESS DOC 0-1 FALLS W/OUT INJ PAST YR: ICD-10-PCS | Mod: CPTII,S$GLB,,

## 2023-06-14 PROCEDURE — 99214 OFFICE O/P EST MOD 30 MIN: CPT | Mod: S$GLB,,,

## 2023-06-14 PROCEDURE — 99214 OFFICE O/P EST MOD 30 MIN: CPT | Mod: 25,S$GLB,, | Performed by: PHYSICIAN ASSISTANT

## 2023-06-14 PROCEDURE — 1159F MED LIST DOCD IN RCRD: CPT | Mod: CPTII,S$GLB,, | Performed by: PHYSICIAN ASSISTANT

## 2023-06-14 PROCEDURE — 3077F PR MOST RECENT SYSTOLIC BLOOD PRESSURE >= 140 MM HG: ICD-10-PCS | Mod: CPTII,S$GLB,, | Performed by: PHYSICIAN ASSISTANT

## 2023-06-14 PROCEDURE — 99999 PR PBB SHADOW E&M-EST. PATIENT-LVL V: CPT | Mod: PBBFAC,,,

## 2023-06-14 PROCEDURE — 1125F AMNT PAIN NOTED PAIN PRSNT: CPT | Mod: CPTII,S$GLB,,

## 2023-06-14 PROCEDURE — 20552 NJX 1/MLT TRIGGER POINT 1/2: CPT | Mod: S$GLB,,, | Performed by: PHYSICIAN ASSISTANT

## 2023-06-14 PROCEDURE — 99999 PR PBB SHADOW E&M-EST. PATIENT-LVL V: ICD-10-PCS | Mod: PBBFAC,,, | Performed by: PHYSICIAN ASSISTANT

## 2023-06-14 PROCEDURE — 99999 PR PBB SHADOW E&M-EST. PATIENT-LVL V: ICD-10-PCS | Mod: PBBFAC,,,

## 2023-06-14 PROCEDURE — 99999 PR PBB SHADOW E&M-EST. PATIENT-LVL V: CPT | Mod: PBBFAC,,, | Performed by: PHYSICIAN ASSISTANT

## 2023-06-14 PROCEDURE — 1125F AMNT PAIN NOTED PAIN PRSNT: CPT | Mod: CPTII,S$GLB,, | Performed by: PHYSICIAN ASSISTANT

## 2023-06-14 PROCEDURE — 3078F PR MOST RECENT DIASTOLIC BLOOD PRESSURE < 80 MM HG: ICD-10-PCS | Mod: CPTII,S$GLB,,

## 2023-06-14 PROCEDURE — 99214 PR OFFICE/OUTPT VISIT, EST, LEVL IV, 30-39 MIN: ICD-10-PCS | Mod: S$GLB,,,

## 2023-06-14 PROCEDURE — 3078F DIAST BP <80 MM HG: CPT | Mod: CPTII,S$GLB,,

## 2023-06-14 PROCEDURE — 1159F MED LIST DOCD IN RCRD: CPT | Mod: CPTII,S$GLB,,

## 2023-06-14 PROCEDURE — 1101F PT FALLS ASSESS-DOCD LE1/YR: CPT | Mod: CPTII,S$GLB,,

## 2023-06-14 PROCEDURE — 3077F SYST BP >= 140 MM HG: CPT | Mod: CPTII,S$GLB,,

## 2023-06-14 PROCEDURE — 20552 PR INJECT TRIGGER POINT, 1 OR 2: ICD-10-PCS | Mod: S$GLB,,, | Performed by: PHYSICIAN ASSISTANT

## 2023-06-14 PROCEDURE — 1125F PR PAIN SEVERITY QUANTIFIED, PAIN PRESENT: ICD-10-PCS | Mod: CPTII,S$GLB,, | Performed by: PHYSICIAN ASSISTANT

## 2023-06-14 PROCEDURE — 1160F PR REVIEW ALL MEDS BY PRESCRIBER/CLIN PHARMACIST DOCUMENTED: ICD-10-PCS | Mod: CPTII,S$GLB,,

## 2023-06-14 PROCEDURE — 99214 PR OFFICE/OUTPT VISIT, EST, LEVL IV, 30-39 MIN: ICD-10-PCS | Mod: 25,S$GLB,, | Performed by: PHYSICIAN ASSISTANT

## 2023-06-14 PROCEDURE — 3077F PR MOST RECENT SYSTOLIC BLOOD PRESSURE >= 140 MM HG: ICD-10-PCS | Mod: CPTII,S$GLB,,

## 2023-06-14 PROCEDURE — 3078F PR MOST RECENT DIASTOLIC BLOOD PRESSURE < 80 MM HG: ICD-10-PCS | Mod: CPTII,S$GLB,, | Performed by: PHYSICIAN ASSISTANT

## 2023-06-14 PROCEDURE — 1160F RVW MEDS BY RX/DR IN RCRD: CPT | Mod: CPTII,S$GLB,,

## 2023-06-14 PROCEDURE — 3288F FALL RISK ASSESSMENT DOCD: CPT | Mod: CPTII,S$GLB,,

## 2023-06-14 PROCEDURE — 1160F PR REVIEW ALL MEDS BY PRESCRIBER/CLIN PHARMACIST DOCUMENTED: ICD-10-PCS | Mod: CPTII,S$GLB,, | Performed by: PHYSICIAN ASSISTANT

## 2023-06-14 PROCEDURE — 1160F RVW MEDS BY RX/DR IN RCRD: CPT | Mod: CPTII,S$GLB,, | Performed by: PHYSICIAN ASSISTANT

## 2023-06-14 PROCEDURE — 3078F DIAST BP <80 MM HG: CPT | Mod: CPTII,S$GLB,, | Performed by: PHYSICIAN ASSISTANT

## 2023-06-14 RX ORDER — METHYLPREDNISOLONE ACETATE 40 MG/ML
40 INJECTION, SUSPENSION INTRA-ARTICULAR; INTRALESIONAL; INTRAMUSCULAR; SOFT TISSUE
Status: COMPLETED | OUTPATIENT
Start: 2023-06-14 | End: 2023-06-14

## 2023-06-14 RX ORDER — CARVEDILOL 12.5 MG/1
12.5 TABLET ORAL 2 TIMES DAILY
Qty: 60 TABLET | Refills: 11 | Status: SHIPPED | OUTPATIENT
Start: 2023-06-14 | End: 2023-06-27

## 2023-06-14 RX ADMIN — METHYLPREDNISOLONE ACETATE 40 MG: 40 INJECTION, SUSPENSION INTRA-ARTICULAR; INTRALESIONAL; INTRAMUSCULAR; SOFT TISSUE at 09:06

## 2023-06-14 NOTE — PROGRESS NOTES
drolEstablished Patient Chronic Pain Clinic Visit    Chief Pain Complaint:  Neck Pain   Lumbar Back Pain       Interval History (6/14/2023):  Shireen Felix presents today for trigger point injections.  Patient was last seen on 06/09/2023. Patient reports pain as 8/10 today. She continues to report pain as primarily located in her lower lumbosacral region with radiation into her buttocks, right worse than left, and wrapping around her lateral hips. She reports additional falls since last visit. She reports worsening neck pain with radiation into her left arm down to her hand with cramping and burning.  She also reports worsening symptoms down both legs.  interfering with her ability to walk at times due to significant weakness. She reports repeated falls due to her legs giving out from underneath her unexpectedly.       Interval History (6/9/2023):  Shireen Felix presents today via telemed for follow-up visit.  Patient was last seen on 05/16/2023. Last injection bilateral L4/5 TFESI on 4/26/23 with 80% relief. Patient reports pain as 10/10 today. She reports pain that began over the past several weeks, became severe 2 days ago. She reports pain is primarily located in her lower lumbosacral region with radiation into her buttocks, right worse than left, and wrapping around her lateral hips. Pain is similar to that prior to SIJ and GT bursa injection, last SIJ injection 11/16/2021 with excellent relief until a few weeks ago when pain returned. She reports in the past she has also received trigger point injections in her lower back that were very helpful.    Interval History (5/16/2023): Shireen Felix presents today for follow-up visit.  she underwent bilateral L4/5 TFESI on 4/26/23.  The patient reports that she is/was better following the procedure.  she reports 80% pain relief.  The changes lasted 4 weeks so far.  The changes have continued through this visit.  Patient reports pain as 7/10 today.  She reports the procedure itself was very painful. Reports she did not feel sedated at all compared to previous procedures with other providers. She is not interested in pursuing additional procedures secondary to the painful procedure.  She reports worsening neck pain with radiation into her right arm down to her hand with cramping and burning.    Interval History (3/9/2023):  Shireen Felix presents today for follow-up visit for CT and EMG review.  Patient was last seen on 2/1/2023. At that visit, the plan was to order updated CT of her cervical and lumbar spine as well as EMG of her bilateral upper extremities. Patient reports pain as 7/10 today.  She reports worsening neck pain with radiation into her left arm down to her hand with cramping and burning.  She also reports worsening symptoms down both legs.  interfering with her ability to walk at times due to significant weakness. Has an appointment with Dr. Navarro on 03/21/2023. Patient fell hit head on concrete 2-11-23, and had a seizure shortly after.  She reports repeated falls due to her legs giving out from underneath her unexpectedly. Referred to Neurology.  She is also currently on antibiotics secondary to having 10 teeth pulled    EMG/NCS BUE 02/24/2023  IMPRESSION  1. ABNORMAL study  2. 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  There is an acute on chronic radiculopathy of the LEFT C6 nerve root and a subacute on chronic of the RIGHT C4 and C5 nerve roots and a CHRONIC radiculopathy of bilateral C4-T1 nerve roots    CT cervical spine 02/07/2023  FINDINGS:  There is a mild amount of dextroconvex curvature of the cervical spine.  There is reversal of the normal cervical lordosis.  There is  grade 1 anterolisthesis of C3 on C4.  There is grade 1 anterolisthesis of C4 on C5.  There is no fracture.  There are mild degenerative changes between C5 and C7.  There is a mild amount of atherosclerosis.  There is moderate partial opacification of the right mastoid air cells.     Impression:     1. There is a mild amount of dextroconvex curvature of the cervical spine.  2. There is reversal of the normal cervical lordosis.  3. There is grade 1 anterolisthesis of C3 on C4.  4. There is grade 1 anterolisthesis of C4 on C5.  5. There are mild degenerative changes between C5 and C7.  6. There is a mild amount of atherosclerosis.  7. There is moderate partial opacification of the right mastoid air cells.  All CT scans at this facility use dose modulation, iterative reconstruction, and/or weight base dosing when appropriate to reduce radiation dose when appropriate to reduce radiation dose to as low as reasonably achievable.     CT lumbar spine 02/07/2023  FINDINGS:  Mild levoscoliosis.     Essentially stable L2 compression fracture.  Chronic appearing deformities of the T12 and L1 vertebral bodies with Schmorl's nodes and height loss.  Stable sclerotic focus in the L5 vertebral body.  No acute fracture.     Multilevel vacuum disc phenomenon from L1/L2 through L5/S1.  Near complete loss of disc space height at the L3/L4 and L4/L5 levels.     Multilevel degenerative changes to include disc bulges, facet arthropathy, and ligamentum flavum hypertrophy.     Discs bulge at the L 1/L2 level on the left may abut the traversing nerve root.     Disc bulge and facet hypertrophy, and ligamentum flavum hypertrophy contribute to moderate central canal stenosis at the L2/L3 level.     Partially calcified disc bulge at the L 3/L4 level contributes to severe right and moderate left neural foraminal narrowing.  There is also moderate to severe central canal stenosis at the L3/L4 level.     Moderate to severe central canal stenosis at  the L4/L5 level posterior disc bulge abuts the L4/L5 right nerve root.  There is severe left neural foraminal narrowing at the L4/L5 level.     Impression:     Text no acute osseous finding.  Moderate to severe degenerative changes of the lumbar spine, as above.     Interval History (1/31/2023):  Shireen Felix presents today for follow-up visit.  Patient was last seen on 10/4/2022. At that visit, the plan was to schedule bilateral L3-5 MBB followed 2 weeks later by XANDER.  She canceled these procedures and reports at this time she is not interested in scheduling.  She reports she has a  and goes to the gym 3-4 times per week which includes walking on a treadmill.   Patient reports pain as 7/10 today. She reports worsening neck pain with radiation into her left arm down to her hand with cramping and burning. This has been severe over the past week. Last cervical CT in 2019, she reports she has sustained multiple falls since then. She also reports worsening symptoms down her left leg, interfering with her ability to walk at times due to significant weakness.      Interval history 10/04/2022  Ms. Felix is a 76-year-old female with past medical history significant for cerebral aneurysm status post craniotomy and repair, cerebral vascular accident, left V1 distribution post herpetic neuralgia, depression, glaucoma, asthma/COPD, coronary artery disease, GERD, nicotine dependence, type 2 diabetes who presents to [facility] care, previous Dr. Dias and Dr. Fulton patient.  Today patient reports pain in the lower back and legs.  Pain is constant today is rated 7/10.  Patient reports pain in a bandlike distribution in the lower back which radiates down the posterior aspects of bilateral lower extremities in L5-S1 distribution to mid thigh.  Pain is described as burning and aching in nature.  Pain is exacerbated when moving from sitting to standing and standing and ambulating just a few feet.  Patient does  report associated weakness in the lower extremities associated with her pain.  Patient reports she is able to ambulate with assistive device, walker only a few feet before requiring rest.  Pain has been improved with prior procedures, including medial branch block and transforaminal epidural steroid injection.  Patient is interested in pursuing repeat injection.  Of note patient has trialed medicinal marijuana with Dr. Mckeon and reports palpitations side effect.  Patient has discontinued tramadol, tizanidine and gabapentin.  Pain is improved with prednisone which she takes prescribed by her pulmonologist.    Interval history:  Dr. Fulton:  05/10/2021-05/06/2022  Shireen Felix is a 76 y.o. female  who is presenting with a chief complaint of Neck Pain. The patient began experiencing this problem insidiously, and the pain has been gradually worsening over the past 6 month(s). The pain is described as throbbing, cramping, aching and heavy and is located in the bilateral cervical spine. Pain is intermittent and lasts hours. The  pain is nonradiating. The patient rates her pain a 7 out of ten and interferes with activities of daily living a 7 out of ten. Pain is exacerbated by rotation of the cervical spine, and is improved by rest. Patient reports no prior trauma, no prior spinal surgery      This patient is a 74 y.o. female who presents today complaining of the above noted pain/s. The patient describes the pain as follows.  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  She reports approximately 2 years ago she had to sudden deaths in the family which caused very stressful time for her and resulted in shingles rash in the left V1 distribution however she reports having excruciating pain in the left V1 and V2 distributions.  She denies having difficulty with eating food and brushing  her teeth on the left side of her face however the left lateral side of her nose gets her excruciating pain.  She has been tried on numerous medications in the past including gabapentin, Lyrica, Valtrex, Celebrex, ibuprofen which have all provided minimal benefit however steroids have been most helpful.  Today she rates her pain as an 4/10 and describes a constant burning sensation the left side of her face in addition to radiation into her bilateral lower extremities, her right shoulder, her left upper extremity occasionally as a pins and needle sensation.  She does report having numbness and weakness in her left lower extremity.  She has been physical therapy which she completed in February of 2019 however this caused most of her low back and leg symptoms to worsen in addition to her post herpetic neuralgia to worsen.  She denies having bowel bladder difficulties.  Her symptoms are worse with activity such as walking in her somewhat improved with rest however she had finds it difficult to get into a comfortable position. She has been using topical lidocaine patches and applying to the left side of her face which does provide significant benefit.  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     Shireen Felix is a 76 y.o. female  who is presenting with a chief complaint of lumbar spine. The patient began experiencing this problem insidiously, and the pain has been gradually worsening over the past 2 year(s). The pain is described as throbbing, shooting, burning, aching and electrical and is located in the bilateral lumbar spine. Pain is intermittent and lasts hours. The pain radiates to bilateral lower extremities. The patient rates her pain a 7 out of ten and interferes with activities of daily living a 6 out of ten. Pain is exacerbated by flexion of the lumbar spine, and is  improved by rest. Patient reports no prior trauma, no prior spinal surgery     Interval HPI 06/03/2020: Dr. Dias:  Ms. Felix returns to clinic for follow-up.  She underwent a T12-L1 transforaminal epidural steroid injection on March 10, 2020 and she reports that this has provided significant pain relief for her which radiated into her right leg prior to the injection.  She does report that his symptoms have returned and the injections worn off her pain is currently rated 7/10.  She was scheduled to undergo bilateral lumbar radiofrequency ablation prior to corona virus however she would like to hold off on that at this time and pursue repeat injection in the low back.  Unfortunately she also reports that she had her wheelchair fall over a few weeks ago and this has resulted in bilateral shoulder pain.  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  She finds that rest does provide some pain relief on activity causes her symptoms to worsen.  She has been able to walk significantly more after her most recent injection in March reported she is able to walk several steps before having to sit rest however this has a vast improvement from prior to the injection.    Interval HPI:  02/12/2020; Dr. Dias  Ms. Felix returns to clinic for follow-up.  She underwent bilateral lumbar medial branch blocks on January 31, 2020 and reports 100% symptomatic pain relief for approximately 1 week and his symptoms slowly began to return.  She continues to have some right-sided lower thoracic posterior pain which was not completely dressed with the medial branch blocks.  Today she rates her pain as an 8/10 which is located primarily in the axial lumbar spine and the lower right posterior thoracic region.  She denies having numbness weakness in the legs but does continue to have a constant aching and throbbing sensation in the low  back.    Interval HPI 01/23/2020: Dr. Dias: Ms. Felix returns to clinic for follow-up.  She reports that she underwent bilateral L4/5 transforaminal epidural steroid injections in November 2019 reports ongoing 80% symptomatic pain relief.  She has also been participating physical therapy which she has found to be very helpful however 4 days ago she started to do some leg raise is in her bed and she felt severe pain in her low back which has not subsided.  She denies having any radiation except for around her flank into her anterior abdomen.  She feels like the pain sometimes comes straight through from her back to her abdomen.  Today she rates her pain as a 10/10 describes it as a constant aching and sharp pain. She has been using a heating pad which is minimally helpful and she has been holding off on physical therapy at this time. She denies having bowel bladder difficulty.     Interval History: Patient was seen on 8/4/19. At that time she underwent bilateral L3/4 TF XANDER.  The patient reports that she is/was better following the procedure.  she reports 75% pain relief.  She had a fall, then pain increased. She is currently living at [facility]. She is doing at home therapy there, which is helping. She is in wheelchair now but hopes to transition to walker soon.  After the fall, she was not able to get out of the bed.      Initial History of Present Illness:  Dr. Dias  This patient is a 74 y.o. female who presents today complaining of the above noted pain/s. The patient describes the pain as follows.  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  She reports approximately 2 years ago she had to sudden deaths in the family which caused very stressful time for her and resulted in shingles rash in the left V1 distribution however she reports having excruciating pain in the left V1  and V2 distributions.  She denies having difficulty with eating food and brushing her teeth on the left side of her face however the left lateral side of her nose gets her excruciating pain.  She has been tried on numerous medications in the past including gabapentin, Lyrica, Valtrex, Celebrex, ibuprofen which have all provided minimal benefit however steroids have been most helpful.  Today she rates her pain as an 8/10 and describes a constant burning sensation the left side of her face in addition to radiation into her bilateral lower extremities, her right shoulder, her left upper extremity occasionally as a pins and needle sensation.  She does report having numbness and weakness in her left lower extremity.  She has been physical therapy which she completed in February of 2019 however this caused most of her low back and leg symptoms to worsen in addition to her post herpetic neuralgia to worsen.  She denies having bowel bladder difficulties.  Her symptoms are worse with activity such as walking in her somewhat improved with rest however she had finds it difficult to get into a comfortable position. She has been using topical lidocaine patches and applying to the left side of her face which does provide significant benefit.  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    - pertinent negatives: No fever, No chills, No weight loss, No bladder dysfunction, No bowel dysfunction, No saddle anesthesia  - pertinent positives: generalized nonspecific Lower Extremity weakness bilaterally    - medications, other therapies tried (physical therapy, injections):     >> NSAIDs, Tylenol, Tramadol, Norco, gabapentin, lyrica, flexeril and medrol dose pack    >> Has previously undergone Physical Therapy      Pain injections:  -bilateral L4/5 TFESI on 4/26/23 with 80% relief  -11/16/2021:  Right-sided SI joint and greater trochanteric bursa injection; Dr. Fulton  -07/09/2020: Bilateral glenohumeral joint injection; Dr. Dias  -06/19/2020: Right-sided T12/L1 transforaminal epidural steroid injection; Dr. Dias  -03/10/2020:  T12/L1 transforaminal epidural steroid injection; Dr. Dias  -01/31/2020: Bilateral L3-5 medial branch blocks; Dr. Dias  -11/12/2019: Bilateral L4/5 transforaminal epidural steroid injection; Dr. Dias      Imaging / Labs / Studies (reviewed on 6/14/2023):  02/06/20    CT Lumbar Spine Without Contrast    Narrative  EXAMINATION:  CT LUMBAR SPINE WITHOUT CONTRAST    CLINICAL HISTORY:  Low back painLow back pain, >6wks conservative tx, persistent-progressive sx, surgical candidate;    FINDINGS:  The lumbar vertebral bodies demonstrate adequate alignment.Disc space narrowing from T11 through S1.No acute fractures are identified.    T11-T12: Disc space narrowing.  Broad-based disc bulge.  Old vertebral endplate fracture of T12.    T12-L1: Small disc protrusion paramedian to the right side.  Degenerative changes of the vertebral endplates and facet joints.    L1-L2: Old fracture of the inferior endplate of L1.  Mild posterior displacement of a fracture fragment minimally narrowing the AP diameter of the canal.  Gas in the disc space consistent with degenerating disc material.    L2-L3: Circumferential disc bulge.  Bilateral degenerative changes of the facets.  Hypertrophy of the ligamentum flavum.  Moderate to severe central spinal stenosis.  Bony neural foraminal narrowing without definite nerve root impingement.    L3-L4: Circumferential disc bulge.  Bilateral degenerative changes of the facets.  Degenerative changes of the vertebral endplates.  Severe central spinal stenosis.  Hypertrophy of the ligamentum flavum and degenerative changes of the facets.    L4-L5: Circumferential disc bulge.  Severe central spinal stenosis.  Bilateral degenerative changes of the facets.  Bilateral bony  neural foraminal narrowing and possible bilateral L4 nerve root impingement.    L5-S1: Circumferential disc bulge.  Gas formation within the disc.  Moderate central spinal stenosis.  Bilateral degenerative changes of the facets.  Bilateral bony neural foraminal narrowing with possible bilateral L5 nerve root impingement.    Atherosclerotic abdominal aorta without aneurysmal dilatation.     07/31/19    CT Cervical Spine Without Contrast    FINDINGS:  Osteopenia.  Grade 2 degenerative spondylolisthesis at C3-C4.  Grade 1 degenerative spondylolisthesis at C4-C5.  Vertebral body height is normal.  No acute fractures. No prevertebral soft tissue swelling. Atlanto-occipital articulation is normal.  Congenitally patulous spinal canal.  No significant spinal stenosis.  Moderate left foraminal stenosis at C3-C4.    01/23/20    X-Ray Lumbar Spine Ap And Lateral  FINDINGS:  Levoscoliosis present.  Vertebral body heights stable.  Alignment unchanged without spondylolisthesis.  Similar appearing multilevel degenerative disc height loss and osteophyte findings noted.  Multilevel facet degenerative findings remain.  Aorta iliac atherosclerotic calcification.  Colonic constipation findings present.    6/04/19    X-Ray Cervical Spine AP And Lateral    FINDINGS:  Reversal normal C-spine curvature noted on the lateral view with visualization to the C7-T1 level.  Minimal anterior subluxation C4 on C5 noted.  There is multilevel marginal spurring and varying degrees of loss of disc height throughout the C-spine.  No acute fracture identified in prevertebral soft tissues, C1-2 articulation and odontoid appear within normal limits allowing for positioning.  Minimal vascular calcification identified on the left in the area of the carotid vessels.    Review of Systems:  CONSTITUTIONAL: patient denies any fever, chills, or weight loss  SKIN: patient denies any rash or itching  RESPIRATORY: patient denies having any shortness of  breath  GASTROINTESTINAL: patient denies having any diarrhea, constipation, or bowel incontinence  GENITOURINARY: patient denies having any abnormal bladder function    MUSCULOSKELETAL:  - patient complains of the above noted pain/s (see chief pain complaint)    NEUROLOGICAL:   - pain as above  - strength in Upper and Lower extremities is decreased, BILATERALLY  - sensation in Upper extremities is intact, BILATERALLY  - patient denies any loss of bowel or bladder control      PSYCHIATRIC: patient denies any change in mood    Other:  All other systems reviewed and are negative      Physical Exam:  LMP  (LMP Unknown)  (reviewed on 6/14/2023)  Physical Exam    GENERAL: Well appearing, in no acute distress, alert and oriented x3.  PSYCH:  Mood and affect appropriate.  SKIN: Skin color, texture, turgor normal, no rashes or lesions.  HEAD/FACE:  Normocephalic, bruising along right lower jaw, Cranial nerves grossly intact.    CV: RRR with palpation of the radial artery.  PULM: No evidence of respiratory difficulty, symmetric chest rise.  GI:  Soft and non-tender.  Neck: TTP along cervical facet joints, positive facet loading, spurlings positive bilaterally, pain with flexion, extension, and rotation - right greater than left  BACK: Straight leg raising in the sitting and supine positions is negative to radicular pain. pain to palpation over the facet joints of the lumbar spine or spinous processes.  Reduced range of motion with pain reproduction   EXTREMITIES:  Peripheral joint range of motion is reduced with pain with obvious instability in bilateral lower extremities.  No deformities, edema, or skin discoloration. Good capillary refill.  SIJ testing:  - TTP over SI joint: Present  - Madhavi's/ Hank's: Positive    - Sacroiliac Distraction Test (anterior pressure): Positive  - Sacroiliac Compression Test (lateral pressure): Positive   - SacralThrust Test (posterior pressure): Positive  -TTP along bilateral GT bursa    MUSCULOSKELETAL:  Unable to stand on heels and toes  hip, and knee provocative maneuvers are negative.  There is no pain with palpation over the sacroiliac joints bilaterally.  Gaenslen's, Distraction/Compression and  FABERs test is negative.  Facet loading test is positive bilaterally.   Bilateral upper and lower extremity strength is normal and symmetric.  No atrophy or tone abnormalities are noted.    RIGHT Lower extremity: Hip flexion 5/5, Hip Abduction 5/5, Hip Adduction 5/5, Knee extension 5/5, Knee flexion 5/5, Ankle dorsiflexion5/5, Extensor hallucis longus 5/5, Ankle plantarflexion 5/5  LEFT Lower extremity:  Hip flexion 5/5, Hip Abduction 5/5,Hip Adduction 5/5, Knee extension 5/5, Knee flexion 5/5, Ankle dorsiflexion 5/5, Extensor hallucis longus 5/5, Ankle plantarflexion 5/5  -Normal testing knee (patellar) jerk and ankle (achilles) jerk    NEURO: Bilateral upper and lower extremity coordination and muscle stretch reflexes are physiologic and symmetric. No loss of sensation is noted.  GAIT:  Patient presents in wheelchair today.  Antalgic gait.      Assessment:    Shireen Felix is a 77 y.o. year old female who is presenting with   No diagnosis found.                Plan:    1. Interventional: Trigger point injections performed today, see procedure note below    She is scheduled for bilateral SIJ + GT bursa injection for sacroiliitis and bursitis, she reports previous injection offered significant relief x 2 years -- NEEDS ADDITIONAL SEDATION      -bilateral L4/5 TFESI on 4/26/23 with 80% relief    Refer to Neurosurgery if no improvement with this injection  Anticoagulation:  Yes, Plavix, does not need to pause for XANDER      2. Pharmacologic:   Continue Gabapentin 100 mg BID per outside provider  Continue tizanidine 4-8 mg prn pain  Begin Nabumetone 500 mg BID for inflammation and pain    3. Rehabilitative:   -Continue activities as tolerated    4. Diagnostic:  CT of cervical spine and lumbar spine  reviewed.  Lumbar CT demonstrates severe spinal stenosis at L4-5 and L5-S1 levels.  EMG of bilateral upper extremities demonstrates severe carpal tunnel syndrome and cervical radiculopathy. New CT of lumbar and cervical spine ordered secondary to additional falls since last visit    5. Follow up: 4 weeks after injection    Alanis Peres PA-C    PROCEDURE NOTE:  Trigger Point Injection:   The procedure was discussed with the patient including complications of nerve damage,  bleeding, infection, and failure of pain relief.   Trigger points were identified by palpation and marked. Alcohol swab prep of sites done. A mixture of 5mL 1% lidocaine + 1 ml 40 mg medrol was prepared (6 mL total).   A 25-gauge needle was advanced to the point of maximal tenderness, and  1 to 1.25mL  was injected after negative aspiration. All sites done in the same manner. Patient tolerated the procedure well and without complications. Patient was monitored for 15 min following the injection before discharged from the clinic.  Sites injected included: quadratus lumborum muscles bilaterally         The above plan and management options were discussed at length with patient. Patient is in agreement with the above and verbalized understanding.    - I discussed the goals of interventional chronic pain management with the patient on today's visit. We discussed a multimodal and systematic approach to pain.  This includes diagnostic and therapeutic injections, adjuvant pharmacologic treatment, physical therapy, and at times psychiatry.  I emphasized the importance of regular exercise, core strengthening and stretching, diet and weight loss as a cornerstone of long-term pain management.    - This condition does not require this patient to take time off of work, and the primary goal of our Pain Management services is to improve the patient's functional capacity.  - Patient Questions: Answered all of the patient's questions regarding diagnoses,  therapy, treatment and next steps    Disclaimer:  This note may have been prepared using voice recognition software, it may have not been extensively proofed, as such there could be errors within the text such as sound alike errors.

## 2023-06-21 ENCOUNTER — HOSPITAL ENCOUNTER (OUTPATIENT)
Dept: RADIOLOGY | Facility: HOSPITAL | Age: 78
Discharge: HOME | End: 2023-06-21
Payer: MEDICARE

## 2023-06-21 DIAGNOSIS — I10 PRIMARY HYPERTENSION: ICD-10-CM

## 2023-06-21 DIAGNOSIS — I25.118 CORONARY ARTERY DISEASE OF NATIVE ARTERY OF NATIVE HEART WITH STABLE ANGINA PECTORIS: ICD-10-CM

## 2023-06-21 PROCEDURE — 93880 EXTRACRANIAL BILAT STUDY: CPT | Mod: TC

## 2023-06-21 PROCEDURE — 93880 EXTRACRANIAL BILAT STUDY: CPT | Mod: 26,,, | Performed by: RADIOLOGY

## 2023-06-21 PROCEDURE — 76770 US RENAL ARTERY STENOSIS HYPERTEN (XPD): ICD-10-PCS | Mod: 26,59,, | Performed by: RADIOLOGY

## 2023-06-21 PROCEDURE — 93975 VASCULAR STUDY: CPT | Mod: 26,,, | Performed by: RADIOLOGY

## 2023-06-21 PROCEDURE — 93975 VASCULAR STUDY: CPT | Mod: TC

## 2023-06-21 PROCEDURE — 93880 US CAROTID BILATERAL: ICD-10-PCS | Mod: 26,,, | Performed by: RADIOLOGY

## 2023-06-21 PROCEDURE — 93975 US RENAL ARTERY STENOSIS HYPERTEN (XPD): ICD-10-PCS | Mod: 26,,, | Performed by: RADIOLOGY

## 2023-06-21 PROCEDURE — 76770 US EXAM ABDO BACK WALL COMP: CPT | Mod: 26,59,, | Performed by: RADIOLOGY

## 2023-06-21 NOTE — PRE-PROCEDURE INSTRUCTIONS
Spoke with patient regarding procedure scheduled on 6.30     Arrival time 0715     Has patient been sick with fever or on antibiotics within the last 7 days? No     Does the patient have any open wounds, sores or rashes? No     Does the patient have any recent fractures? no     Has patient received a vaccination within the last 7 days? No     Received the COVID vaccination?      Has the patient stopped all medications as directed? na     Does patient have a pacemaker and or defibrillator? no     Does the patient have a ride to and from procedure and someone reliable to remain with patient? serjio     Is the patient diabetic? yes    Does the patient have sleep apnea? Or use O2 at home? no     Is the patient receiving sedation?      Is the patient instructed to remain NPO beginning at midnight the night before their procedure? yes     Procedure location confirmed with patient? Yes     Covid- Denies signs/symptoms. Instructed to notify PAT/MD if any changes.

## 2023-06-22 RX ORDER — MIRABEGRON 50 MG/1
TABLET, FILM COATED, EXTENDED RELEASE ORAL
Qty: 30 TABLET | Refills: 0 | Status: SHIPPED | OUTPATIENT
Start: 2023-06-22 | End: 2023-07-17

## 2023-06-23 ENCOUNTER — HOSPITAL ENCOUNTER (OUTPATIENT)
Dept: RADIOLOGY | Facility: HOSPITAL | Age: 78
Discharge: HOME | End: 2023-06-23
Attending: PHYSICIAN ASSISTANT
Payer: MEDICARE

## 2023-06-23 DIAGNOSIS — M48.02 SPINAL STENOSIS, CERVICAL REGION: ICD-10-CM

## 2023-06-23 DIAGNOSIS — M54.59 OTHER LOW BACK PAIN: ICD-10-CM

## 2023-06-23 DIAGNOSIS — M54.16 LUMBAR RADICULOPATHY, CHRONIC: ICD-10-CM

## 2023-06-23 PROCEDURE — 72131 CT LUMBAR SPINE W/O DYE: CPT | Mod: 26,,, | Performed by: RADIOLOGY

## 2023-06-23 PROCEDURE — 72125 CT NECK SPINE W/O DYE: CPT | Mod: TC

## 2023-06-23 PROCEDURE — 72131 CT LUMBAR SPINE WITHOUT CONTRAST: ICD-10-PCS | Mod: 26,,, | Performed by: RADIOLOGY

## 2023-06-23 PROCEDURE — 72125 CT CERVICAL SPINE WITHOUT CONTRAST: ICD-10-PCS | Mod: 26,,, | Performed by: STUDENT IN AN ORGANIZED HEALTH CARE EDUCATION/TRAINING PROGRAM

## 2023-06-23 PROCEDURE — 72125 CT NECK SPINE W/O DYE: CPT | Mod: 26,,, | Performed by: STUDENT IN AN ORGANIZED HEALTH CARE EDUCATION/TRAINING PROGRAM

## 2023-06-23 PROCEDURE — 72131 CT LUMBAR SPINE W/O DYE: CPT | Mod: TC

## 2023-06-26 ENCOUNTER — TELEPHONE (OUTPATIENT)
Dept: CARDIOLOGY | Facility: CLINIC | Age: 78
End: 2023-06-26
Payer: MEDICARE

## 2023-06-26 NOTE — TELEPHONE ENCOUNTER
Attempted to contact pt and inform her that her renal and carotid US nml, follow up as scheduled for BP check. No answer, LVM to call back    ----- Message from Indira Schumacher NP sent at 6/26/2023 12:12 PM CDT -----  Renal and carotid US nml, follow up as scheduled for BP check

## 2023-06-26 NOTE — TELEPHONE ENCOUNTER
Contacted pt and informed her that her renal and carotid US nml, follow up as scheduled for BP check. Pt states she is a bit concerned w/ BP and HR levels:  Last night: /105  HR 91  This AM: /102   HR 89  This afternoon: /91    HR 85  I stated I would pass message to ANGELY Schumacher NP. Pt vu w/o q/c      ----- Message from Indira Schumacher NP sent at 6/26/2023 12:12 PM CDT -----  Renal and carotid US nml, follow up as scheduled for BP check

## 2023-06-27 ENCOUNTER — OFFICE VISIT (OUTPATIENT)
Dept: PAIN MEDICINE | Facility: CLINIC | Age: 78
End: 2023-06-27
Payer: MEDICARE

## 2023-06-27 ENCOUNTER — TELEPHONE (OUTPATIENT)
Dept: CARDIOLOGY | Facility: CLINIC | Age: 78
End: 2023-06-27
Payer: MEDICARE

## 2023-06-27 VITALS
HEART RATE: 92 BPM | SYSTOLIC BLOOD PRESSURE: 131 MMHG | DIASTOLIC BLOOD PRESSURE: 69 MMHG | HEIGHT: 64 IN | WEIGHT: 122.56 LBS | BODY MASS INDEX: 20.92 KG/M2

## 2023-06-27 DIAGNOSIS — M54.2 CERVICALGIA: ICD-10-CM

## 2023-06-27 DIAGNOSIS — M54.16 LUMBAR RADICULOPATHY, CHRONIC: ICD-10-CM

## 2023-06-27 DIAGNOSIS — I10 PRIMARY HYPERTENSION: Primary | ICD-10-CM

## 2023-06-27 DIAGNOSIS — R26.9 GAIT ABNORMALITY: Primary | ICD-10-CM

## 2023-06-27 PROCEDURE — 3075F SYST BP GE 130 - 139MM HG: CPT | Mod: CPTII,S$GLB,, | Performed by: PHYSICIAN ASSISTANT

## 2023-06-27 PROCEDURE — 3288F PR FALLS RISK ASSESSMENT DOCUMENTED: ICD-10-PCS | Mod: CPTII,S$GLB,, | Performed by: PHYSICIAN ASSISTANT

## 2023-06-27 PROCEDURE — 3288F FALL RISK ASSESSMENT DOCD: CPT | Mod: CPTII,S$GLB,, | Performed by: PHYSICIAN ASSISTANT

## 2023-06-27 PROCEDURE — 1101F PR PT FALLS ASSESS DOC 0-1 FALLS W/OUT INJ PAST YR: ICD-10-PCS | Mod: CPTII,S$GLB,, | Performed by: PHYSICIAN ASSISTANT

## 2023-06-27 PROCEDURE — 99214 PR OFFICE/OUTPT VISIT, EST, LEVL IV, 30-39 MIN: ICD-10-PCS | Mod: S$GLB,,, | Performed by: PHYSICIAN ASSISTANT

## 2023-06-27 PROCEDURE — 1160F PR REVIEW ALL MEDS BY PRESCRIBER/CLIN PHARMACIST DOCUMENTED: ICD-10-PCS | Mod: CPTII,S$GLB,, | Performed by: PHYSICIAN ASSISTANT

## 2023-06-27 PROCEDURE — 1159F MED LIST DOCD IN RCRD: CPT | Mod: CPTII,S$GLB,, | Performed by: PHYSICIAN ASSISTANT

## 2023-06-27 PROCEDURE — 3078F DIAST BP <80 MM HG: CPT | Mod: CPTII,S$GLB,, | Performed by: PHYSICIAN ASSISTANT

## 2023-06-27 PROCEDURE — 1125F AMNT PAIN NOTED PAIN PRSNT: CPT | Mod: CPTII,S$GLB,, | Performed by: PHYSICIAN ASSISTANT

## 2023-06-27 PROCEDURE — 1159F PR MEDICATION LIST DOCUMENTED IN MEDICAL RECORD: ICD-10-PCS | Mod: CPTII,S$GLB,, | Performed by: PHYSICIAN ASSISTANT

## 2023-06-27 PROCEDURE — 1160F RVW MEDS BY RX/DR IN RCRD: CPT | Mod: CPTII,S$GLB,, | Performed by: PHYSICIAN ASSISTANT

## 2023-06-27 PROCEDURE — 1101F PT FALLS ASSESS-DOCD LE1/YR: CPT | Mod: CPTII,S$GLB,, | Performed by: PHYSICIAN ASSISTANT

## 2023-06-27 PROCEDURE — 3078F PR MOST RECENT DIASTOLIC BLOOD PRESSURE < 80 MM HG: ICD-10-PCS | Mod: CPTII,S$GLB,, | Performed by: PHYSICIAN ASSISTANT

## 2023-06-27 PROCEDURE — 3075F PR MOST RECENT SYSTOLIC BLOOD PRESS GE 130-139MM HG: ICD-10-PCS | Mod: CPTII,S$GLB,, | Performed by: PHYSICIAN ASSISTANT

## 2023-06-27 PROCEDURE — 99999 PR PBB SHADOW E&M-EST. PATIENT-LVL V: ICD-10-PCS | Mod: PBBFAC,,, | Performed by: PHYSICIAN ASSISTANT

## 2023-06-27 PROCEDURE — 1125F PR PAIN SEVERITY QUANTIFIED, PAIN PRESENT: ICD-10-PCS | Mod: CPTII,S$GLB,, | Performed by: PHYSICIAN ASSISTANT

## 2023-06-27 PROCEDURE — 99214 OFFICE O/P EST MOD 30 MIN: CPT | Mod: S$GLB,,, | Performed by: PHYSICIAN ASSISTANT

## 2023-06-27 PROCEDURE — 99999 PR PBB SHADOW E&M-EST. PATIENT-LVL V: CPT | Mod: PBBFAC,,, | Performed by: PHYSICIAN ASSISTANT

## 2023-06-27 RX ORDER — CARVEDILOL 25 MG/1
25 TABLET ORAL 2 TIMES DAILY WITH MEALS
Qty: 60 TABLET | Refills: 11 | Status: ON HOLD | OUTPATIENT
Start: 2023-06-27 | End: 2023-08-16 | Stop reason: HOSPADM

## 2023-06-27 NOTE — PROGRESS NOTES
Established Patient Chronic Pain Clinic Visit    Chief Pain Complaint:  Neck Pain   Lumbar Back Pain     Interval History (6/27/2023):  Shireen Felix presents today for follow-up visit.  Patient was last seen on 5/16/2023. She reports some improvement in her pain with TPIs given at last visit. She reports continued variations in her BP throughout the day. She has followed up with Neurology who has made changes to her seizure medications. Seems to be tolerating med change well. Continues to monitor BP. Has followed up with PCP and other specialist to rule out other causes for BP changes (renal, intracranial, etc).  Patient reports pain as 7/10 today. No changes on CT compared to previous.    CT Cervical spine 06/23/2023  FINDINGS:  There is unchanged grade 1 anterolisthesis of C3 on C4 and C4 on C5.  There is no new spondylolisthesis.  There is no loss of vertebral body height.  There is multilevel degenerative disc space narrowing most significant at C5-C6 and C6-C7.  There is unchanged diffuse degenerative facet arthropathy with posterior disc osteophyte complexes and uncovertebral osteophytosis resulting in varying degrees of canal and foraminal stenosis.  This is incompletely evaluated on this exam.     The included portions of the posterior fossa are normal.  The craniocervical junction is symmetric.  The atlantoaxial articulation is normal.  The airway is widely patent.  The thyroid gland is normal.  There is no cervical adenopathy.  The visualized lung apices are clear.     Impression:     Multilevel degenerative spondylolisthesis and spondylosis resulting in varying degrees of canal and foraminal stenosis, grossly unchanged from prior exam.    CT lumbar spine 06/23/2023  FINDINGS:  Five non-rib-bearing lumbar type vertebral bodies are present.  There is mild Levoscoliotic curvature of the lumbar spine centered at L3, similar compared to the prior.     With regards to alignment, there is minimal right  lateral listhesis of L1 on L2 and L2 on L3.  There is minimal left lateral listhesis of L3 on L4.  No anterolisthesis or retrolisthesis.     There are chronic compression deformities through the superior endplate of T12, inferior endplate of L1 and superior endplate of L2.  Degree of vertebral body height loss is stable since 02/07/2023.  Vertebral body heights from L3-L5 are preserved.     Mild to severe degenerative disc changes are present throughout the lumbar spine, worst at the right half of L3-L4 and L4-L5.  Partially calcified disc bulges are seen at L2-L3, L3-L4, L4-L5 and L5-S1.  Evaluation for central canal narrowing is limited without intrathecal contrast.  Degrees of central canal stenosis appear worst at L3-L4 and L4-L5.     Hypertrophic facet changes are present throughout the lumbar spine, worst at L3-L4 and L4-L5.     Paraspinal soft tissues appear within normal limits.  Both SI joints appear intact with mild degenerative changes, similar compared to the prior.     Impression:     1. Multilevel degenerative disc and hypertrophic facet changes similar compared to 02/07/2023.  2. Chronic appearing compression deformities at T12, L1 and L2, similar compared to 02/07/2023.  Dense of acute injury.    Interval History (6/14/2023):  Shireen Felix presents today for trigger point injections.  Patient was last seen on 06/09/2023. Patient reports pain as 8/10 today. She continues to report pain as primarily located in her lower lumbosacral region with radiation into her buttocks, right worse than left, and wrapping around her lateral hips. She reports additional falls since last visit. She reports worsening neck pain with radiation into her left arm down to her hand with cramping and burning.  She also reports worsening symptoms down both legs.  interfering with her ability to walk at times due to significant weakness. She reports repeated falls due to her legs giving out from underneath her  unexpectedly.       Interval History (6/9/2023):  Shireen Felix presents today via telemed for follow-up visit.  Patient was last seen on 05/16/2023. Last injection bilateral L4/5 TFESI on 4/26/23 with 80% relief. Patient reports pain as 10/10 today. She reports pain that began over the past several weeks, became severe 2 days ago. She reports pain is primarily located in her lower lumbosacral region with radiation into her buttocks, right worse than left, and wrapping around her lateral hips. Pain is similar to that prior to SIJ and GT bursa injection, last SIJ injection 11/16/2021 with excellent relief until a few weeks ago when pain returned. She reports in the past she has also received trigger point injections in her lower back that were very helpful.    Interval History (5/16/2023): Shireen Felix presents today for follow-up visit.  she underwent bilateral L4/5 TFESI on 4/26/23.  The patient reports that she is/was better following the procedure.  she reports 80% pain relief.  The changes lasted 4 weeks so far.  The changes have continued through this visit.  Patient reports pain as 7/10 today. She reports the procedure itself was very painful. Reports she did not feel sedated at all compared to previous procedures with other providers. She is not interested in pursuing additional procedures secondary to the painful procedure.  She reports worsening neck pain with radiation into her right arm down to her hand with cramping and burning.    Interval History (3/9/2023):  Shireen Felix presents today for follow-up visit for CT and EMG review.  Patient was last seen on 2/1/2023. At that visit, the plan was to order updated CT of her cervical and lumbar spine as well as EMG of her bilateral upper extremities. Patient reports pain as 7/10 today.  She reports worsening neck pain with radiation into her left arm down to her hand with cramping and burning.  She also reports worsening symptoms down both  legs.  interfering with her ability to walk at times due to significant weakness. Has an appointment with Dr. Navarro on 03/21/2023. Patient fell hit head on concrete 2-11-23, and had a seizure shortly after.  She reports repeated falls due to her legs giving out from underneath her unexpectedly. Referred to Neurology.  She is also currently on antibiotics secondary to having 10 teeth pulled    EMG/NCS BUE 02/24/2023  IMPRESSION  1. ABNORMAL study  2. 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  There is an acute on chronic radiculopathy of the LEFT C6 nerve root and a subacute on chronic of the RIGHT C4 and C5 nerve roots and a CHRONIC radiculopathy of bilateral C4-T1 nerve roots    CT cervical spine 02/07/2023  FINDINGS:  There is a mild amount of dextroconvex curvature of the cervical spine.  There is reversal of the normal cervical lordosis.  There is grade 1 anterolisthesis of C3 on C4.  There is grade 1 anterolisthesis of C4 on C5.  There is no fracture.  There are mild degenerative changes between C5 and C7.  There is a mild amount of atherosclerosis.  There is moderate partial opacification of the right mastoid air cells.     Impression:     1. There is a mild amount of dextroconvex curvature of the cervical spine.  2. There is reversal of the normal cervical lordosis.  3. There is grade 1 anterolisthesis of C3 on C4.  4. There is grade 1 anterolisthesis of C4 on C5.  5. There are mild degenerative changes between C5 and C7.  6. There is a mild amount of atherosclerosis.  7. There is moderate partial opacification of the right mastoid air cells.  All CT scans at this facility use dose modulation, iterative reconstruction, and/or weight base dosing when  appropriate to reduce radiation dose when appropriate to reduce radiation dose to as low as reasonably achievable.     CT lumbar spine 02/07/2023  FINDINGS:  Mild levoscoliosis.     Essentially stable L2 compression fracture.  Chronic appearing deformities of the T12 and L1 vertebral bodies with Schmorl's nodes and height loss.  Stable sclerotic focus in the L5 vertebral body.  No acute fracture.     Multilevel vacuum disc phenomenon from L1/L2 through L5/S1.  Near complete loss of disc space height at the L3/L4 and L4/L5 levels.     Multilevel degenerative changes to include disc bulges, facet arthropathy, and ligamentum flavum hypertrophy.     Discs bulge at the L 1/L2 level on the left may abut the traversing nerve root.     Disc bulge and facet hypertrophy, and ligamentum flavum hypertrophy contribute to moderate central canal stenosis at the L2/L3 level.     Partially calcified disc bulge at the L 3/L4 level contributes to severe right and moderate left neural foraminal narrowing.  There is also moderate to severe central canal stenosis at the L3/L4 level.     Moderate to severe central canal stenosis at the L4/L5 level posterior disc bulge abuts the L4/L5 right nerve root.  There is severe left neural foraminal narrowing at the L4/L5 level.     Impression:     Text no acute osseous finding.  Moderate to severe degenerative changes of the lumbar spine, as above.     Interval History (1/31/2023):  Shireen Felix presents today for follow-up visit.  Patient was last seen on 10/4/2022. At that visit, the plan was to schedule bilateral L3-5 MBB followed 2 weeks later by XANDER.  She canceled these procedures and reports at this time she is not interested in scheduling.  She reports she has a  and goes to the gym 3-4 times per week which includes walking on a treadmill.   Patient reports pain as 7/10 today. She reports worsening neck pain with radiation into her left arm down to her hand with  cramping and burning. This has been severe over the past week. Last cervical CT in 2019, she reports she has sustained multiple falls since then. She also reports worsening symptoms down her left leg, interfering with her ability to walk at times due to significant weakness.      Interval history 10/04/2022  Ms. Felix is a 76-year-old female with past medical history significant for cerebral aneurysm status post craniotomy and repair, cerebral vascular accident, left V1 distribution post herpetic neuralgia, depression, glaucoma, asthma/COPD, coronary artery disease, GERD, nicotine dependence, type 2 diabetes who presents to [facility] care, previous Dr. Dias and Dr. Fulton patient.  Today patient reports pain in the lower back and legs.  Pain is constant today is rated 7/10.  Patient reports pain in a bandlike distribution in the lower back which radiates down the posterior aspects of bilateral lower extremities in L5-S1 distribution to mid thigh.  Pain is described as burning and aching in nature.  Pain is exacerbated when moving from sitting to standing and standing and ambulating just a few feet.  Patient does report associated weakness in the lower extremities associated with her pain.  Patient reports she is able to ambulate with assistive device, walker only a few feet before requiring rest.  Pain has been improved with prior procedures, including medial branch block and transforaminal epidural steroid injection.  Patient is interested in pursuing repeat injection.  Of note patient has trialed medicinal marijuana with Dr. Mckeon and reports palpitations side effect.  Patient has discontinued tramadol, tizanidine and gabapentin.  Pain is improved with prednisone which she takes prescribed by her pulmonologist.    Interval history:  Dr. Fulton:  05/10/2021-05/06/2022  Shireen Felix is a 76 y.o. female  who is presenting with a chief complaint of Neck Pain. The patient began experiencing this problem  insidiously, and the pain has been gradually worsening over the past 6 month(s). The pain is described as throbbing, cramping, aching and heavy and is located in the bilateral cervical spine. Pain is intermittent and lasts hours. The  pain is nonradiating. The patient rates her pain a 7 out of ten and interferes with activities of daily living a 7 out of ten. Pain is exacerbated by rotation of the cervical spine, and is improved by rest. Patient reports no prior trauma, no prior spinal surgery      This patient is a 74 y.o. female who presents today complaining of the above noted pain/s. The patient describes the pain as follows.  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  She reports approximately 2 years ago she had to sudden deaths in the family which caused very stressful time for her and resulted in shingles rash in the left V1 distribution however she reports having excruciating pain in the left V1 and V2 distributions.  She denies having difficulty with eating food and brushing her teeth on the left side of her face however the left lateral side of her nose gets her excruciating pain.  She has been tried on numerous medications in the past including gabapentin, Lyrica, Valtrex, Celebrex, ibuprofen which have all provided minimal benefit however steroids have been most helpful.  Today she rates her pain as an 4/10 and describes a constant burning sensation the left side of her face in addition to radiation into her bilateral lower extremities, her right shoulder, her left upper extremity occasionally as a pins and needle sensation.  She does report having numbness and weakness in her left lower extremity.  She has been physical therapy which she completed in February of 2019 however this caused most of her low back and leg symptoms to worsen in addition to her post herpetic  neuralgia to worsen.  She denies having bowel bladder difficulties.  Her symptoms are worse with activity such as walking in her somewhat improved with rest however she had finds it difficult to get into a comfortable position. She has been using topical lidocaine patches and applying to the left side of her face which does provide significant benefit.  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     Shireen Felix is a 76 y.o. female  who is presenting with a chief complaint of lumbar spine. The patient began experiencing this problem insidiously, and the pain has been gradually worsening over the past 2 year(s). The pain is described as throbbing, shooting, burning, aching and electrical and is located in the bilateral lumbar spine. Pain is intermittent and lasts hours. The pain radiates to bilateral lower extremities. The patient rates her pain a 7 out of ten and interferes with activities of daily living a 6 out of ten. Pain is exacerbated by flexion of the lumbar spine, and is improved by rest. Patient reports no prior trauma, no prior spinal surgery     Interval HPI 06/03/2020: Dr. Dias:  Ms. Felix returns to clinic for follow-up.  She underwent a T12-L1 transforaminal epidural steroid injection on March 10, 2020 and she reports that this has provided significant pain relief for her which radiated into her right leg prior to the injection.  She does report that his symptoms have returned and the injections worn off her pain is currently rated 7/10.  She was scheduled to undergo bilateral lumbar radiofrequency ablation prior to corona virus however she would like to hold off on that at this time and pursue repeat injection in the low back.  Unfortunately she also reports that she had her wheelchair fall over a few weeks ago and this has resulted in bilateral shoulder  pain.  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  She finds that rest does provide some pain relief on activity causes her symptoms to worsen.  She has been able to walk significantly more after her most recent injection in March reported she is able to walk several steps before having to sit rest however this has a vast improvement from prior to the injection.    Interval HPI:  02/12/2020; Dr. Dias  Ms. Felix returns to clinic for follow-up.  She underwent bilateral lumbar medial branch blocks on January 31, 2020 and reports 100% symptomatic pain relief for approximately 1 week and his symptoms slowly began to return.  She continues to have some right-sided lower thoracic posterior pain which was not completely dressed with the medial branch blocks.  Today she rates her pain as an 8/10 which is located primarily in the axial lumbar spine and the lower right posterior thoracic region.  She denies having numbness weakness in the legs but does continue to have a constant aching and throbbing sensation in the low back.    Interval HPI 01/23/2020: Dr. Dias: Ms. Felix returns to clinic for follow-up.  She reports that she underwent bilateral L4/5 transforaminal epidural steroid injections in November 2019 reports ongoing 80% symptomatic pain relief.  She has also been participating physical therapy which she has found to be very helpful however 4 days ago she started to do some leg raise is in her bed and she felt severe pain in her low back which has not subsided.  She denies having any radiation except for around her flank into her anterior abdomen.  She feels like the pain sometimes comes straight through from her back to her abdomen.  Today she rates her pain as a 10/10 describes it as a constant aching and sharp pain. She has been using a heating pad which is minimally helpful and she has been holding off on  physical therapy at this time. She denies having bowel bladder difficulty.     Interval History: Patient was seen on 8/4/19. At that time she underwent bilateral L3/4 TF XANDER.  The patient reports that she is/was better following the procedure.  she reports 75% pain relief.  She had a fall, then pain increased. She is currently living at [facility]. She is doing at home therapy there, which is helping. She is in wheelchair now but hopes to transition to walker soon.  After the fall, she was not able to get out of the bed.      Initial History of Present Illness:  Dr. Dias  This patient is a 74 y.o. female who presents today complaining of the above noted pain/s. The patient describes the pain as follows.  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  She reports approximately 2 years ago she had to sudden deaths in the family which caused very stressful time for her and resulted in shingles rash in the left V1 distribution however she reports having excruciating pain in the left V1 and V2 distributions.  She denies having difficulty with eating food and brushing her teeth on the left side of her face however the left lateral side of her nose gets her excruciating pain.  She has been tried on numerous medications in the past including gabapentin, Lyrica, Valtrex, Celebrex, ibuprofen which have all provided minimal benefit however steroids have been most helpful.  Today she rates her pain as an 8/10 and describes a constant burning sensation the left side of her face in addition to radiation into her bilateral lower extremities, her right shoulder, her left upper extremity occasionally as a pins and needle sensation.  She does report having numbness and weakness in her left lower extremity.  She has been physical therapy which she completed in February of 2019 however this caused most  of her low back and leg symptoms to worsen in addition to her post herpetic neuralgia to worsen.  She denies having bowel bladder difficulties.  Her symptoms are worse with activity such as walking in her somewhat improved with rest however she had finds it difficult to get into a comfortable position. She has been using topical lidocaine patches and applying to the left side of her face which does provide significant benefit.  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    - pertinent negatives: No fever, No chills, No weight loss, No bladder dysfunction, No bowel dysfunction, No saddle anesthesia  - pertinent positives: generalized nonspecific Lower Extremity weakness bilaterally    - medications, other therapies tried (physical therapy, injections):     >> NSAIDs, Tylenol, Tramadol, Norco, gabapentin, lyrica, flexeril and medrol dose pack    >> Has previously undergone Physical Therapy      Pain injections:  -bilateral L4/5 TFESI on 4/26/23 with 80% relief  -11/16/2021: Right-sided SI joint and greater trochanteric bursa injection; Dr. Fulton  -07/09/2020: Bilateral glenohumeral joint injection; Dr. Dias  -06/19/2020: Right-sided T12/L1 transforaminal epidural steroid injection; Dr. Dias  -03/10/2020:  T12/L1 transforaminal epidural steroid injection; Dr. Dias  -01/31/2020: Bilateral L3-5 medial branch blocks; Dr. Dias  -11/12/2019: Bilateral L4/5 transforaminal epidural steroid injection; Dr. Dias      Imaging / Labs / Studies (reviewed on 6/27/2023):  02/06/20    CT Lumbar Spine Without Contrast    Narrative  EXAMINATION:  CT LUMBAR SPINE WITHOUT CONTRAST    CLINICAL HISTORY:  Low back painLow back pain, >6wks conservative tx, persistent-progressive sx, surgical candidate;    FINDINGS:  The lumbar vertebral bodies demonstrate adequate alignment.Disc space narrowing from  T11 through S1.No acute fractures are identified.    T11-T12: Disc space narrowing.  Broad-based disc bulge.  Old vertebral endplate fracture of T12.    T12-L1: Small disc protrusion paramedian to the right side.  Degenerative changes of the vertebral endplates and facet joints.    L1-L2: Old fracture of the inferior endplate of L1.  Mild posterior displacement of a fracture fragment minimally narrowing the AP diameter of the canal.  Gas in the disc space consistent with degenerating disc material.    L2-L3: Circumferential disc bulge.  Bilateral degenerative changes of the facets.  Hypertrophy of the ligamentum flavum.  Moderate to severe central spinal stenosis.  Bony neural foraminal narrowing without definite nerve root impingement.    L3-L4: Circumferential disc bulge.  Bilateral degenerative changes of the facets.  Degenerative changes of the vertebral endplates.  Severe central spinal stenosis.  Hypertrophy of the ligamentum flavum and degenerative changes of the facets.    L4-L5: Circumferential disc bulge.  Severe central spinal stenosis.  Bilateral degenerative changes of the facets.  Bilateral bony neural foraminal narrowing and possible bilateral L4 nerve root impingement.    L5-S1: Circumferential disc bulge.  Gas formation within the disc.  Moderate central spinal stenosis.  Bilateral degenerative changes of the facets.  Bilateral bony neural foraminal narrowing with possible bilateral L5 nerve root impingement.    Atherosclerotic abdominal aorta without aneurysmal dilatation.     07/31/19    CT Cervical Spine Without Contrast    FINDINGS:  Osteopenia.  Grade 2 degenerative spondylolisthesis at C3-C4.  Grade 1 degenerative spondylolisthesis at C4-C5.  Vertebral body height is normal.  No acute fractures. No prevertebral soft tissue swelling. Atlanto-occipital articulation is normal.  Congenitally patulous spinal canal.  No significant spinal stenosis.  Moderate left foraminal stenosis at  C3-C4.    01/23/20    X-Ray Lumbar Spine Ap And Lateral  FINDINGS:  Levoscoliosis present.  Vertebral body heights stable.  Alignment unchanged without spondylolisthesis.  Similar appearing multilevel degenerative disc height loss and osteophyte findings noted.  Multilevel facet degenerative findings remain.  Aorta iliac atherosclerotic calcification.  Colonic constipation findings present.    6/04/19    X-Ray Cervical Spine AP And Lateral    FINDINGS:  Reversal normal C-spine curvature noted on the lateral view with visualization to the C7-T1 level.  Minimal anterior subluxation C4 on C5 noted.  There is multilevel marginal spurring and varying degrees of loss of disc height throughout the C-spine.  No acute fracture identified in prevertebral soft tissues, C1-2 articulation and odontoid appear within normal limits allowing for positioning.  Minimal vascular calcification identified on the left in the area of the carotid vessels.    Review of Systems:  CONSTITUTIONAL: patient denies any fever, chills, or weight loss  SKIN: patient denies any rash or itching  RESPIRATORY: patient denies having any shortness of breath  GASTROINTESTINAL: patient denies having any diarrhea, constipation, or bowel incontinence  GENITOURINARY: patient denies having any abnormal bladder function    MUSCULOSKELETAL:  - patient complains of the above noted pain/s (see chief pain complaint)    NEUROLOGICAL:   - pain as above  - strength in Upper and Lower extremities is decreased, BILATERALLY  - sensation in Upper extremities is intact, BILATERALLY  - patient denies any loss of bowel or bladder control      PSYCHIATRIC: patient denies any change in mood    Other:  All other systems reviewed and are negative      Physical Exam:  LMP  (LMP Unknown)  (reviewed on 6/27/2023)  Physical Exam    GENERAL: Well appearing, in no acute distress, alert and oriented x3.  PSYCH:  Mood and affect appropriate.  SKIN: Skin color, texture, turgor normal, no  rashes or lesions.  HEAD/FACE:  Normocephalic, bruising along right lower jaw, Cranial nerves grossly intact.    CV: RRR with palpation of the radial artery.  PULM: No evidence of respiratory difficulty, symmetric chest rise.  GI:  Soft and non-tender.  Neck: TTP along cervical facet joints, positive facet loading, spurlings positive bilaterally, pain with flexion, extension, and rotation - right greater than left  BACK: Straight leg raising in the sitting and supine positions is negative to radicular pain. pain to palpation over the facet joints of the lumbar spine or spinous processes.  Reduced range of motion with pain reproduction   EXTREMITIES:  Peripheral joint range of motion is reduced with pain with obvious instability in bilateral lower extremities.  No deformities, edema, or skin discoloration. Good capillary refill.  SIJ testing:  - TTP over SI joint: Present  - Madhavi's/ Hank's: Positive    - Sacroiliac Distraction Test (anterior pressure): Positive  - Sacroiliac Compression Test (lateral pressure): Positive   - SacralThrust Test (posterior pressure): Positive  -TTP along bilateral GT bursa   MUSCULOSKELETAL:  Unable to stand on heels and toes  hip, and knee provocative maneuvers are negative.  There is no pain with palpation over the sacroiliac joints bilaterally.  Gaenslen's, Distraction/Compression and  FABERs test is negative.  Facet loading test is positive bilaterally.   Bilateral upper and lower extremity strength is normal and symmetric.  No atrophy or tone abnormalities are noted.    RIGHT Lower extremity: Hip flexion 5/5, Hip Abduction 5/5, Hip Adduction 5/5, Knee extension 5/5, Knee flexion 5/5, Ankle dorsiflexion5/5, Extensor hallucis longus 5/5, Ankle plantarflexion 5/5  LEFT Lower extremity:  Hip flexion 5/5, Hip Abduction 5/5,Hip Adduction 5/5, Knee extension 5/5, Knee flexion 5/5, Ankle dorsiflexion 5/5, Extensor hallucis longus 5/5, Ankle plantarflexion 5/5  -Normal testing knee  (patellar) jerk and ankle (achilles) jerk    NEURO: Bilateral upper and lower extremity coordination and muscle stretch reflexes are physiologic and symmetric. No loss of sensation is noted.  GAIT:  Patient presents in wheelchair today.  Antalgic gait.      Assessment:    Shireen Felix is a 77 y.o. year old female who is presenting with   No diagnosis found.                Plan:    1. Interventional: Trigger point injections performed at previous visit    She is scheduled for bilateral SIJ + GT bursa injection for sacroiliitis and bursitis, she reports previous injection offered significant relief x 2 years -- NEEDS ADDITIONAL SEDATION      -bilateral L4/5 TFESI on 4/26/23 with 80% relief    Refer to Neurosurgery if no improvement with this injection  Anticoagulation:  Yes, Plavix, does not need to pause for XANDER      2. Pharmacologic:   Continue Gabapentin 100 mg BID per outside provider  Continue tizanidine 4-8 mg prn pain  Begin Nabumetone 500 mg BID for inflammation and pain    3. Rehabilitative:   -Continue activities as tolerated    4. Diagnostic:  CT of cervical spine and lumbar spine reviewed.  Lumbar CT demonstrates severe spinal stenosis at L4-5 and L5-S1 levels.  EMG of bilateral upper extremities demonstrates severe carpal tunnel syndrome and cervical radiculopathy.     5. Follow up: 4 weeks after injection    Alanis Peres PA-C      The above plan and management options were discussed at length with patient. Patient is in agreement with the above and verbalized understanding.    - I discussed the goals of interventional chronic pain management with the patient on today's visit. We discussed a multimodal and systematic approach to pain.  This includes diagnostic and therapeutic injections, adjuvant pharmacologic treatment, physical therapy, and at times psychiatry.  I emphasized the importance of regular exercise, core strengthening and stretching, diet and weight loss as a cornerstone of  long-term pain management.    - This condition does not require this patient to take time off of work, and the primary goal of our Pain Management services is to improve the patient's functional capacity.  - Patient Questions: Answered all of the patient's questions regarding diagnoses, therapy, treatment and next steps    Disclaimer:  This note may have been prepared using voice recognition software, it may have not been extensively proofed, as such there could be errors within the text such as sound alike errors.

## 2023-06-27 NOTE — TELEPHONE ENCOUNTER
Brother was notified of the change and will get in contact and let the pt know. He states he takes care of her pt. He states he will go over to her home and set up the new doses of medication for the pt thanks chucky Schumacher NP  P Indira Schumacher Staff  Caller: Unspecified (Yesterday,  3:35 PM)  Will Increase Coreg to 25mg BID           Previous Messages       ----- Message -----   From: Susy Shaffer MA   Sent: 6/26/2023   3:41 PM CDT   To: Indira Schumacher NP        Patient Calls  (Newest Message First)  [facility] Staff 12 minutes ago (3:01 PM)     CG  Will Increase Coreg to 25mg BID      Susy Shaffer MA routed conversation to Indira Schumacher NP 23 hours ago (3:41 PM)     IsidroShireentte [phone]  Susy Shaffer MA 23 hours ago (3:35 PM)     Susy Shaffer MA 23 hours ago (3:35 PM)     LINCOLN  Contacted pt and informed her that her renal and carotid US nml, follow up as scheduled for BP check. Pt states she is a bit concerned w/ BP and HR levels:  Last night: /105  HR 91  This AM: /102   HR 89  This afternoon: /91    HR 85  I stated I would pass message to ANGELY Schumacher NP. Pt vu w/o q/c        ----- Message from Indira Schumacher NP sent at 6/26/2023 12:12 PM CDT -----  Renal and carotid US nml, follow up as scheduled for BP check         Note            Recent Patient Communication

## 2023-06-30 ENCOUNTER — OFFICE VISIT (OUTPATIENT)
Dept: CARDIOLOGY | Facility: CLINIC | Age: 78
End: 2023-06-30
Payer: MEDICARE

## 2023-06-30 VITALS
BODY MASS INDEX: 20.18 KG/M2 | WEIGHT: 118.19 LBS | OXYGEN SATURATION: 98 % | DIASTOLIC BLOOD PRESSURE: 74 MMHG | HEIGHT: 64 IN | HEART RATE: 90 BPM | SYSTOLIC BLOOD PRESSURE: 150 MMHG

## 2023-06-30 DIAGNOSIS — E78.2 MIXED HYPERLIPIDEMIA: ICD-10-CM

## 2023-06-30 DIAGNOSIS — I50.32 CHRONIC DIASTOLIC HEART FAILURE: ICD-10-CM

## 2023-06-30 DIAGNOSIS — Z86.79 HISTORY OF CEREBRAL ANEURYSM: ICD-10-CM

## 2023-06-30 DIAGNOSIS — I10 PRIMARY HYPERTENSION: Primary | ICD-10-CM

## 2023-06-30 DIAGNOSIS — I25.118 CORONARY ARTERY DISEASE OF NATIVE ARTERY OF NATIVE HEART WITH STABLE ANGINA PECTORIS: ICD-10-CM

## 2023-06-30 DIAGNOSIS — I99.8 FLUCTUATING BLOOD PRESSURE: ICD-10-CM

## 2023-06-30 PROCEDURE — 3288F FALL RISK ASSESSMENT DOCD: CPT | Mod: CPTII,S$GLB,,

## 2023-06-30 PROCEDURE — 3077F PR MOST RECENT SYSTOLIC BLOOD PRESSURE >= 140 MM HG: ICD-10-PCS | Mod: CPTII,S$GLB,,

## 2023-06-30 PROCEDURE — 1160F RVW MEDS BY RX/DR IN RCRD: CPT | Mod: CPTII,S$GLB,,

## 2023-06-30 PROCEDURE — 1125F PR PAIN SEVERITY QUANTIFIED, PAIN PRESENT: ICD-10-PCS | Mod: CPTII,S$GLB,,

## 2023-06-30 PROCEDURE — 3078F PR MOST RECENT DIASTOLIC BLOOD PRESSURE < 80 MM HG: ICD-10-PCS | Mod: CPTII,S$GLB,,

## 2023-06-30 PROCEDURE — 1160F PR REVIEW ALL MEDS BY PRESCRIBER/CLIN PHARMACIST DOCUMENTED: ICD-10-PCS | Mod: CPTII,S$GLB,,

## 2023-06-30 PROCEDURE — 1101F PT FALLS ASSESS-DOCD LE1/YR: CPT | Mod: CPTII,S$GLB,,

## 2023-06-30 PROCEDURE — 1159F PR MEDICATION LIST DOCUMENTED IN MEDICAL RECORD: ICD-10-PCS | Mod: CPTII,S$GLB,,

## 2023-06-30 PROCEDURE — 3288F PR FALLS RISK ASSESSMENT DOCUMENTED: ICD-10-PCS | Mod: CPTII,S$GLB,,

## 2023-06-30 PROCEDURE — 3078F DIAST BP <80 MM HG: CPT | Mod: CPTII,S$GLB,,

## 2023-06-30 PROCEDURE — 99999 PR PBB SHADOW E&M-EST. PATIENT-LVL V: ICD-10-PCS | Mod: PBBFAC,,,

## 2023-06-30 PROCEDURE — 3077F SYST BP >= 140 MM HG: CPT | Mod: CPTII,S$GLB,,

## 2023-06-30 PROCEDURE — 99999 PR PBB SHADOW E&M-EST. PATIENT-LVL V: CPT | Mod: PBBFAC,,,

## 2023-06-30 PROCEDURE — 99214 OFFICE O/P EST MOD 30 MIN: CPT | Mod: S$GLB,,,

## 2023-06-30 PROCEDURE — 1101F PR PT FALLS ASSESS DOC 0-1 FALLS W/OUT INJ PAST YR: ICD-10-PCS | Mod: CPTII,S$GLB,,

## 2023-06-30 PROCEDURE — 99214 PR OFFICE/OUTPT VISIT, EST, LEVL IV, 30-39 MIN: ICD-10-PCS | Mod: S$GLB,,,

## 2023-06-30 PROCEDURE — 1159F MED LIST DOCD IN RCRD: CPT | Mod: CPTII,S$GLB,,

## 2023-06-30 PROCEDURE — 1125F AMNT PAIN NOTED PAIN PRSNT: CPT | Mod: CPTII,S$GLB,,

## 2023-06-30 RX ORDER — DILTIAZEM HYDROCHLORIDE 120 MG/1
240 CAPSULE, EXTENDED RELEASE ORAL DAILY
Qty: 30 CAPSULE | Refills: 3 | Status: SHIPPED | OUTPATIENT
Start: 2023-06-30 | End: 2023-07-19

## 2023-06-30 RX ORDER — ESLICARBAZEPINE ACETATE 400 MG/1
400 TABLET ORAL
COMMUNITY
Start: 2023-06-22

## 2023-06-30 NOTE — PROGRESS NOTES
Subjective:   Patient ID:  Shireen Felix is a 77 y.o. female who presents for evaluation of No chief complaint on file.      [id number] y.o. female pt with HFPEF, CAD PCI x1 done in [place], CVI, prior CVA, COPD, seizure disorder, DM type II, HTN, and ANDREI here for follow up CV eval. Last seen by Dr. Dye 5/17/23 for HTN and Imdur was increased, HCTZ 25mg PRN, lasix PRN, was also referred to Neuro, ENT, PMR, Nephro, Heme/Onc, pulm, pain management. Here today for follow up for BP. Pt reports feeling fatigue, weakness. Denies any CP, angina or anginal equivalent at this time.      Home BP logs 170s-200s/90-100s    Echo 6/2/23 nml heart function  Nuclear stress nml    6/30/23  Pt presents today for BP follow up. She was started in 25mg coreg 2 days ago. Carotid and renal stenosis ultrasound normal. Home BP readings 140s/80s after medication. Pt compliant with medications. She also reports her heart rate jumping and feeling palpitations in the afternoons. Reports occasional angina episodes    Past Medical History:   Diagnosis Date    Abnormal ECG 8/5/2019    Aneurysm     Anticoagulant long-term use     Arthritis     CAD in native artery 8/5/2019    COPD (chronic obstructive pulmonary disease)     Diabetes mellitus     Fall 10/10/2019    Formatting of this note might be different from the original. Found sitting on floor next to bed last night Mild confusion today Does not recall falling or how she ended up on floor UA today Labs yesterday unremarkable    Glaucoma     Hypertension     Seizures     Shingles 05/27/2017    Stroke        Past Surgical History:   Procedure Laterality Date    BRAIN SURGERY      CARDIAC CATHETERIZATION      CORONARY ANGIOPLASTY      EPIDURAL STEROID INJECTION INTO LUMBAR SPINE Bilateral 11/12/2019    Procedure: TF XANDER L4/5;  Surgeon: Desean Dias MD;  [facility];  Service: Pain Management;  Laterality: Bilateral;    HYSTERECTOMY      INJECTION OF ANESTHETIC AGENT AROUND  MEDIAL BRANCH NERVES INNERVATING LUMBAR FACET JOINT Bilateral 2020    Procedure: Bilateral L3-5 MBB;  Surgeon: Desean Dias MD;  [facility];  Service: Pain Management;  Laterality: Bilateral;    INJECTION OF ANESTHETIC AGENT INTO SACROILIAC JOINT Right 2021    Procedure: Right BLOCK, SACROILIAC JOINT Right GTB with RN IV sedation;  Surgeon: Yao Fulton MD;  Location: [facility];  Service: Pain Management;  Laterality: Right;    INJECTION OF JOINT Bilateral 2020    Procedure: Bilateral shoulder GH Joint injection with local;  Surgeon: Desean Dias MD;  Location: [facility];  Service: Pain Management;  Laterality: Bilateral;    SELECTIVE INJECTION OF ANESTHETIC AGENT AROUND LUMBAR SPINAL NERVE ROOT BY TRANSFORAMINAL APPROACH Bilateral 2023    Procedure: Bilateral L4/5 TF XANDER RN IV Sedation;  Surgeon: Francisco Oshea MD;  Location: [facility];  Service: Pain Management;  Laterality: Bilateral;    TRANSFORAMINAL EPIDURAL INJECTION OF STEROID Bilateral 2019    Procedure: Bilateral L3/4 Transforaminal Epidural Steroid Injection;  Surgeon: Desean Dias MD;  [facility];  Service: Pain Management;  Laterality: Bilateral;    TRANSFORAMINAL EPIDURAL INJECTION OF STEROID Bilateral 3/10/2020    Procedure: Right T12/L1 TF XANDER with local;  Surgeon: Desean Dias MD;  [facility];  Service: Pain Management;  Laterality: Bilateral;    TRANSFORAMINAL EPIDURAL INJECTION OF STEROID Right 2020    Procedure: Right T12/L1 TFESI Covid day of procedure;  Surgeon: Desean Dias MD;  [facility];  Service: Pain Management;  Laterality: Right;       Social History     Tobacco Use    Smoking status: Former     Packs/day: 1.00     Years: 42.00     Pack years: 42.00     Types: Cigarettes     Start date:      Quit date: 2004     Years since quittin.2    Smokeless tobacco: Never   Substance Use Topics    Alcohol use: No    Drug  use: Never       Family History   Problem Relation Age of Onset    No Known Problems Mother     No Known Problems Father        Current Outpatient Medications on File Prior to Visit   Medication Sig Dispense Refill    albuterol (PROVENTIL) 2.5 mg /3 mL (0.083 %) nebulizer solution Take 2.5 mg by nebulization every 6 (six) hours as needed for Wheezing. Rescue      albuterol (PROVENTIL/VENTOLIN HFA) 90 mcg/actuation inhaler INHALE TWO PUFFS INTO THE LUNGS EVERY 4 HOURS AS NEEDED 18 g 11    amLODIPine (NORVASC) 10 MG tablet TAKE 1 TABLET BY MOUTH ONCE A DAY 90 tablet 3    APTIOM 400 mg Tab tablet Take 400 mg by mouth.      atorvastatin (LIPITOR) 40 MG tablet TAKE 1 TABLET BY MOUTH ONCE DAILY 90 tablet 1    carvediloL (COREG) 25 MG tablet Take 1 tablet (25 mg total) by mouth 2 (two) times daily with meals. 60 tablet 11    cholecalciferol, vitamin D3, 1,250 mcg (50,000 unit) capsule Take 1 capsule (50,000 Units total) by mouth every 7 days. 12 capsule 0    clopidogreL (PLAVIX) 75 mg tablet Take 1 tablet (75 mg total) by mouth once daily. 90 tablet 3    DALIRESP 500 mcg Tab TAKE 1 TABLET BY MOUTH ONCE DAILY 90 tablet 3    denosumab (PROLIA) 60 mg/mL Syrg every 6 (six) months.      dorzolamide-timolol 2-0.5% (COSOPT) 22.3-6.8 mg/mL ophthalmic solution Place 1 drop into both eyes every 12 (twelve) hours. 10 mL 12    EScitalopram oxalate (LEXAPRO) 20 MG tablet TAKE 1 TABLET BY MOUTH ONCE DAILY 90 tablet 1    fluticasone propionate (FLONASE) 50 mcg/actuation nasal spray USE 2 SPRAYS INTO EACH NOSTRIL ONCE A DAY AT 6AM AS DIRECTED 16 g 11    fluticasone-umeclidin-vilanter (TRELEGY ELLIPTA) 200-62.5-25 mcg inhaler INHALE 1 PUFF BY MOUTH ONCE A DAY 60 each 11    furosemide (LASIX) 20 MG tablet Take 1 tablet (20 mg total) by mouth daily as needed (Take next 3 days for more fluid removal from CHF). Take next 3 days for more fluid removal from CHF 60 tablet 3    hydroCHLOROthiazide (HYDRODIURIL) 25 MG tablet TAKE 1 TABLET BY MOUTH  ONCE DAILY AS NEEDED 90 tablet 3    ipratropium (ATROVENT) 42 mcg (0.06 %) nasal spray USE 2 SPRAYS INTO EACH NOSTRIL FOUR TIMES DAILY 15 mL 11    isosorbide mononitrate (IMDUR) 60 MG 24 hr tablet Take 1 tablet (60 mg total) by mouth every evening. 90 tablet 1    ketoconazole (NIZORAL) 2 % cream Apply topically 2 (two) times daily. For dry flaky patches of ear. 30 g 1    latanoprost 0.005 % ophthalmic solution Place 1 drop into both eyes every evening. 2.5 mL 12    levETIRAcetam (KEPPRA) 500 MG Tab Take 1 tablet (500 mg total) by mouth 2 (two) times daily. 180 tablet 3    levocetirizine (XYZAL) 5 MG tablet Take 1 tablet (5 mg total) by mouth every evening. 30 tablet 11    magnesium oxide (MAG-OX) 400 mg (241.3 mg magnesium) tablet Take 1 tablet (400 mg total) by mouth once daily. 90 tablet 1    metFORMIN (GLUCOPHAGE) 500 MG tablet TAKE 1 TABLET BY MOUTH ONCE DAILY 90 tablet 3    montelukast (SINGULAIR) 10 mg tablet TAKE 1 TABLET BY MOUTH ONCE A DAY 90 tablet 3    MYRBETRIQ 50 mg Tb24 TAKE 1 TABLET BY MOUTH ONCE DAILY 30 tablet 0    nabumetone (RELAFEN) 500 MG tablet Take 1 tablet (500 mg total) by mouth 2 (two) times daily. 60 tablet 2    nitroGLYCERIN (NITROSTAT) 0.4 MG SL tablet DISSOLVE 1 TABLET UNDER TONGUE EVERY 5 MINUTES FOR CHEST PAIN (MAY TAKE UP TO 3 DOSES THEN SEEK MEDICAL ATTENTION) 25 tablet 0    nortriptyline (PAMELOR) 25 MG capsule Take 1 capsule (25 mg total) by mouth every evening. 90 capsule 0    omeprazole (PRILOSEC) 40 MG capsule TAKE 1 CAPSULE BY MOUTH ONCE DAILY IN THE MORNING 90 capsule 3    potassium chloride SA (K-DUR,KLOR-CON) 20 MEQ tablet Take 1 tablet (20 mEq total) by mouth once daily. Take extra dose with Lasix - fluid pill 90 tablet 1    ranolazine (RANEXA) 1,000 mg Tb12 Take 1 tablet (1,000 mg total) by mouth 2 (two) times daily. 180 tablet 3    tiZANidine (ZANAFLEX) 4 MG tablet Take 1-1.5 tablets (4-6 mg total) by mouth 2 (two) times daily as needed (muscle spasms). May cause  drowsiness. 90 tablet 4    triamcinolone acetonide 0.025% (KENALOG) 0.025 % cream Apply topically 2 (two) times daily. PRN rash and itching of ear. Mild steroid cream. 30 g 0    valsartan (DIOVAN) 320 MG tablet Take 1 tablet (320 mg total) by mouth once daily. 30 tablet 3    [DISCONTINUED] diltiaZEM (DILACOR XR) 120 MG CDCR Take 1 capsule (120 mg total) by mouth once daily. 30 capsule 3     No current facility-administered medications on file prior to visit.      Wt Readings from Last 3 Encounters:   06/30/23 53.6 kg (118 lb 2.7 oz)   06/27/23 55.6 kg (122 lb 9.2 oz)   06/14/23 55.6 kg (122 lb 9.2 oz)     Temp Readings from Last 3 Encounters:   05/17/23 97.1 °F (36.2 °C)   04/26/23 98 °F (36.7 °C) (Temporal)   03/03/23 97.3 °F (36.3 °C) (Tympanic)     BP Readings from Last 3 Encounters:   06/30/23 (!) 150/74   06/27/23 131/69   06/14/23 (!) 145/70     Pulse Readings from Last 3 Encounters:   06/30/23 90   06/27/23 92   06/14/23 71        Review of Systems   Constitutional: Positive for malaise/fatigue.   HENT: Negative.     Eyes: Negative.    Cardiovascular:  Positive for chest pain and palpitations.   Respiratory: Negative.     Skin: Negative.    Musculoskeletal: Negative.    Gastrointestinal: Negative.    Genitourinary: Negative.    Neurological:  Positive for weakness.   Psychiatric/Behavioral: Negative.       Objective:   Physical Exam  Vitals and nursing note reviewed.   Constitutional:       Appearance: Normal appearance.   HENT:      Head: Normocephalic.   Eyes:      Pupils: Pupils are equal, round, and reactive to light.   Cardiovascular:      Rate and Rhythm: Normal rate and regular rhythm.      Heart sounds: S1 normal and S2 normal. Murmur heard.     No S3 or S4 sounds.   Pulmonary:      Effort: Pulmonary effort is normal.      Breath sounds: Normal breath sounds.   Abdominal:      General: Bowel sounds are normal.      Palpations: Abdomen is soft.   Musculoskeletal:         General: Normal range of  motion.      Cervical back: Normal range of motion.   Skin:     Capillary Refill: Capillary refill takes less than 2 seconds.   Neurological:      General: No focal deficit present.      Mental Status: She is alert and oriented to person, place, and time.      Motor: Weakness present.      Comments: Uses wheelchair occasionally    Psychiatric:         Mood and Affect: Mood normal.         Behavior: Behavior normal.         Thought Content: Thought content normal.       Lab Results   Component Value Date    CHOL 159 10/26/2022    CHOL 162 05/03/2022    CHOL 134 02/03/2022     Lab Results   Component Value Date    HDL 65 10/26/2022    HDL 71 05/03/2022    HDL 59 02/03/2022     Lab Results   Component Value Date    LDLCALC 79.8 10/26/2022    LDLCALC 81.4 05/03/2022    LDLCALC 58.0 (L) 02/03/2022     Lab Results   Component Value Date    TRIG 71 10/26/2022    TRIG 48 05/03/2022    TRIG 85 02/03/2022     Lab Results   Component Value Date    CHOLHDL 40.9 10/26/2022    CHOLHDL 43.8 05/03/2022    CHOLHDL 44.0 02/03/2022       Chemistry        Component Value Date/Time     05/22/2023 0843    K 3.4 (L) 05/22/2023 0843     05/22/2023 0843    CO2 23 05/22/2023 0843    BUN 11 05/22/2023 0843    CREATININE 0.8 05/22/2023 0843     05/22/2023 0843        Component Value Date/Time    CALCIUM 8.6 (L) 05/22/2023 0843    ALKPHOS 57 05/22/2023 0843    AST 16 05/22/2023 0843    ALT 14 05/22/2023 0843    BILITOT 0.3 05/22/2023 0843    ESTGFRAFRICA >60 06/02/2022 1549    EGFRNONAA >60 06/02/2022 1549          Lab Results   Component Value Date    TSH 1.249 08/09/2021     Lab Results   Component Value Date    INR 1.0 11/05/2020    INR 1.0 08/20/2020    INR 1.0 08/05/2020     @RESUFAST(WBC,HGB,HCT,MCV,PLT)  @LABRCNTIP(BNP,BNPTRIAGEBLO)@  CrCl cannot be calculated (Patient's most recent lab result is older than the maximum 7 days allowed.).     Results for orders placed during the hospital encounter of  06/02/23    Echo    Interpretation Summary  · The left ventricle is normal in size with concentric hypertrophy and low normal systolic function.  · The estimated ejection fraction is 50%.  · Normal left ventricular diastolic function.  · There is abnormal septal wall motion consistent with left bundle branch block.  · Normal right ventricular size with normal right ventricular systolic function.  · Normal central venous pressure (3 mmHg).  · The estimated PA systolic pressure is 30 mmHg.  · Mild mitral regurgitation.     Results for orders placed during the hospital encounter of 06/02/23    Nuclear Stress - Cardiology Interpreted    Interpretation Summary    Normal myocardial perfusion scan. There is no evidence of myocardial ischemia or infarction.    The gated perfusion images showed an ejection fraction of 54% at rest. The gated perfusion images showed an ejection fraction of 62% post stress.    There is normal wall motion at rest and post stress.    LV cavity size is normal at rest and normal at stress.    The ECG portion of the study is negative for ischemia.    The patient reported no chest pain during the stress test.     Assessment:      1. Primary hypertension    2. Coronary artery disease of native artery of native heart with stable angina pectoris    3. Mixed hyperlipidemia    4. History of cerebral aneurysm    5. Fluctuating blood pressure    6. Chronic diastolic heart failure          Plan:   Primary hypertension  -     diltiaZEM (DILACOR XR) 120 MG CDCR; Take 2 capsules (240 mg total) by mouth once daily.  Dispense: 30 capsule; Refill: 3    Coronary artery disease of native artery of native heart with stable angina pectoris    Mixed hyperlipidemia    History of cerebral aneurysm    Fluctuating blood pressure    Chronic diastolic heart failure      Increased diltiazem 240mg given HTN and palpitations HR 80-90s in clinic, consider increasing coreg to 50mg BID if no relief    Cont amlodipine, diltiazem,  HCTZ, Imdur, ARB- HTN  Ranexa qd/ SL nitro PRN- chest pains  Statin, plavix- HLD/CAD/CVA    Cont BP log  Bring BP cuff in next visit    RTC 2 week BP check.  Follows with Dr. Dye in clinic    Courtney Guillot, FNP-C [facility], Cardiology

## 2023-07-03 ENCOUNTER — TELEPHONE (OUTPATIENT)
Dept: NEPHROLOGY | Facility: CLINIC | Age: 78
End: 2023-07-03
Payer: MEDICARE

## 2023-07-03 DIAGNOSIS — N18.2 STAGE 2 CHRONIC KIDNEY DISEASE: Primary | ICD-10-CM

## 2023-07-03 NOTE — TELEPHONE ENCOUNTER
----- Message from Abdiel Willard sent at 7/3/2023  8:55 AM CDT -----  Contact: jETTY  Patient is calling regarding blood work before 7/10 appt, please call .[phone]       Thanks

## 2023-07-06 ENCOUNTER — LAB VISIT (OUTPATIENT)
Dept: LAB | Facility: HOSPITAL | Age: 78
End: 2023-07-06
Attending: INTERNAL MEDICINE
Payer: MEDICARE

## 2023-07-06 DIAGNOSIS — N18.2 STAGE 2 CHRONIC KIDNEY DISEASE: ICD-10-CM

## 2023-07-06 LAB
ANION GAP SERPL CALC-SCNC: 11 MMOL/L (ref 8–16)
BASOPHILS # BLD AUTO: 0.04 K/UL (ref 0–0.2)
BASOPHILS NFR BLD: 0.7 % (ref 0–1.9)
BUN SERPL-MCNC: 11 MG/DL (ref 8–23)
CALCIUM SERPL-MCNC: 8.9 MG/DL (ref 8.7–10.5)
CHLORIDE SERPL-SCNC: 103 MMOL/L (ref 95–110)
CO2 SERPL-SCNC: 22 MMOL/L (ref 23–29)
CREAT SERPL-MCNC: 0.9 MG/DL (ref 0.5–1.4)
DIFFERENTIAL METHOD: ABNORMAL
EOSINOPHIL # BLD AUTO: 0.2 K/UL (ref 0–0.5)
EOSINOPHIL NFR BLD: 3.5 % (ref 0–8)
ERYTHROCYTE [DISTWIDTH] IN BLOOD BY AUTOMATED COUNT: 12.9 % (ref 11.5–14.5)
EST. GFR  (NO RACE VARIABLE): >60 ML/MIN/1.73 M^2
GLUCOSE SERPL-MCNC: 94 MG/DL (ref 70–110)
HCT VFR BLD AUTO: 36.5 % (ref 37–48.5)
HGB BLD-MCNC: 11.3 G/DL (ref 12–16)
IMM GRANULOCYTES # BLD AUTO: 0.02 K/UL (ref 0–0.04)
IMM GRANULOCYTES NFR BLD AUTO: 0.4 % (ref 0–0.5)
LYMPHOCYTES # BLD AUTO: 1.4 K/UL (ref 1–4.8)
LYMPHOCYTES NFR BLD: 25.5 % (ref 18–48)
MCH RBC QN AUTO: 29.5 PG (ref 27–31)
MCHC RBC AUTO-ENTMCNC: 31 G/DL (ref 32–36)
MCV RBC AUTO: 95 FL (ref 82–98)
MONOCYTES # BLD AUTO: 0.6 K/UL (ref 0.3–1)
MONOCYTES NFR BLD: 10 % (ref 4–15)
NEUTROPHILS # BLD AUTO: 3.3 K/UL (ref 1.8–7.7)
NEUTROPHILS NFR BLD: 59.9 % (ref 38–73)
NRBC BLD-RTO: 0 /100 WBC
PLATELET # BLD AUTO: 219 K/UL (ref 150–450)
PMV BLD AUTO: 10.1 FL (ref 9.2–12.9)
POTASSIUM SERPL-SCNC: 4.2 MMOL/L (ref 3.5–5.1)
RBC # BLD AUTO: 3.83 M/UL (ref 4–5.4)
SODIUM SERPL-SCNC: 136 MMOL/L (ref 136–145)
WBC # BLD AUTO: 5.49 K/UL (ref 3.9–12.7)

## 2023-07-06 PROCEDURE — 80048 BASIC METABOLIC PNL TOTAL CA: CPT | Performed by: INTERNAL MEDICINE

## 2023-07-06 PROCEDURE — 36415 COLL VENOUS BLD VENIPUNCTURE: CPT | Performed by: INTERNAL MEDICINE

## 2023-07-06 PROCEDURE — 85025 COMPLETE CBC W/AUTO DIFF WBC: CPT | Performed by: INTERNAL MEDICINE

## 2023-07-07 ENCOUNTER — PATIENT MESSAGE (OUTPATIENT)
Dept: INFECTIOUS DISEASES | Facility: CLINIC | Age: 78
End: 2023-07-07
Payer: MEDICARE

## 2023-07-10 ENCOUNTER — TELEPHONE (OUTPATIENT)
Dept: NEPHROLOGY | Facility: CLINIC | Age: 78
End: 2023-07-10
Payer: MEDICARE

## 2023-07-11 ENCOUNTER — TELEPHONE (OUTPATIENT)
Dept: CARDIOLOGY | Facility: CLINIC | Age: 78
End: 2023-07-11
Payer: MEDICARE

## 2023-07-11 NOTE — TELEPHONE ENCOUNTER
Cont BP log  Bring BP cuff in next visit     RTC 2 week BP check.  Follows with Dr. Dye in clinic    The patient has been notified of this information and all questions answered.  Pt coming tomorrow for nurse visit for BP check    ----- Message from Felipa Nash sent at 7/11/2023 12:38 PM CDT -----  Contact: Rm/ Melia  Patients brother is calling to speak with the nurse regarding appt. Reports wanting to move 7/14 appt for tomorrow if possible. Please give patients brother  a call back at .[phone].

## 2023-07-12 ENCOUNTER — LAB VISIT (OUTPATIENT)
Dept: LAB | Facility: HOSPITAL | Age: 78
End: 2023-07-12
Attending: INTERNAL MEDICINE
Payer: MEDICARE

## 2023-07-12 ENCOUNTER — OFFICE VISIT (OUTPATIENT)
Dept: NEPHROLOGY | Facility: CLINIC | Age: 78
End: 2023-07-12
Payer: MEDICARE

## 2023-07-12 ENCOUNTER — TELEPHONE (OUTPATIENT)
Dept: CARDIOLOGY | Facility: CLINIC | Age: 78
End: 2023-07-12
Payer: MEDICARE

## 2023-07-12 VITALS
BODY MASS INDEX: 20.32 KG/M2 | RESPIRATION RATE: 18 BRPM | WEIGHT: 119.06 LBS | HEIGHT: 64 IN | HEART RATE: 88 BPM | SYSTOLIC BLOOD PRESSURE: 142 MMHG | DIASTOLIC BLOOD PRESSURE: 66 MMHG

## 2023-07-12 DIAGNOSIS — I10 PRIMARY HYPERTENSION: ICD-10-CM

## 2023-07-12 DIAGNOSIS — I10 PRIMARY HYPERTENSION: Primary | ICD-10-CM

## 2023-07-12 LAB
BACTERIA #/AREA URNS HPF: NORMAL /HPF
BILIRUB UR QL STRIP: ABNORMAL
CLARITY UR: CLEAR
COLOR UR: YELLOW
CREAT UR-MCNC: 97 MG/DL (ref 15–325)
GLUCOSE UR QL STRIP: NEGATIVE
HGB UR QL STRIP: NEGATIVE
HYALINE CASTS #/AREA URNS LPF: 0 /LPF
KETONES UR QL STRIP: NEGATIVE
LEUKOCYTE ESTERASE UR QL STRIP: NEGATIVE
MICROSCOPIC COMMENT: NORMAL
NITRITE UR QL STRIP: NEGATIVE
PH UR STRIP: 7 [PH] (ref 5–8)
PROT UR QL STRIP: ABNORMAL
PROT UR-MCNC: 49 MG/DL (ref 0–15)
PROT/CREAT UR: 0.51 MG/G{CREAT} (ref 0–0.2)
RBC #/AREA URNS HPF: 0 /HPF (ref 0–4)
SP GR UR STRIP: 1.02 (ref 1–1.03)
SQUAMOUS #/AREA URNS HPF: 1 /HPF
URN SPEC COLLECT METH UR: ABNORMAL
WBC #/AREA URNS HPF: 2 /HPF (ref 0–5)

## 2023-07-12 PROCEDURE — 81000 URINALYSIS NONAUTO W/SCOPE: CPT | Performed by: INTERNAL MEDICINE

## 2023-07-12 PROCEDURE — 1101F PT FALLS ASSESS-DOCD LE1/YR: CPT | Mod: CPTII,S$GLB,, | Performed by: INTERNAL MEDICINE

## 2023-07-12 PROCEDURE — 1125F PR PAIN SEVERITY QUANTIFIED, PAIN PRESENT: ICD-10-PCS | Mod: CPTII,S$GLB,, | Performed by: INTERNAL MEDICINE

## 2023-07-12 PROCEDURE — 99999 PR PBB SHADOW E&M-EST. PATIENT-LVL V: ICD-10-PCS | Mod: PBBFAC,,, | Performed by: INTERNAL MEDICINE

## 2023-07-12 PROCEDURE — 99215 PR OFFICE/OUTPT VISIT, EST, LEVL V, 40-54 MIN: ICD-10-PCS | Mod: S$GLB,,, | Performed by: INTERNAL MEDICINE

## 2023-07-12 PROCEDURE — 1125F AMNT PAIN NOTED PAIN PRSNT: CPT | Mod: CPTII,S$GLB,, | Performed by: INTERNAL MEDICINE

## 2023-07-12 PROCEDURE — 1101F PR PT FALLS ASSESS DOC 0-1 FALLS W/OUT INJ PAST YR: ICD-10-PCS | Mod: CPTII,S$GLB,, | Performed by: INTERNAL MEDICINE

## 2023-07-12 PROCEDURE — 84156 ASSAY OF PROTEIN URINE: CPT | Performed by: INTERNAL MEDICINE

## 2023-07-12 PROCEDURE — 3288F FALL RISK ASSESSMENT DOCD: CPT | Mod: CPTII,S$GLB,, | Performed by: INTERNAL MEDICINE

## 2023-07-12 PROCEDURE — 3078F PR MOST RECENT DIASTOLIC BLOOD PRESSURE < 80 MM HG: ICD-10-PCS | Mod: CPTII,S$GLB,, | Performed by: INTERNAL MEDICINE

## 2023-07-12 PROCEDURE — 3288F PR FALLS RISK ASSESSMENT DOCUMENTED: ICD-10-PCS | Mod: CPTII,S$GLB,, | Performed by: INTERNAL MEDICINE

## 2023-07-12 PROCEDURE — 1159F PR MEDICATION LIST DOCUMENTED IN MEDICAL RECORD: ICD-10-PCS | Mod: CPTII,S$GLB,, | Performed by: INTERNAL MEDICINE

## 2023-07-12 PROCEDURE — 3077F PR MOST RECENT SYSTOLIC BLOOD PRESSURE >= 140 MM HG: ICD-10-PCS | Mod: CPTII,S$GLB,, | Performed by: INTERNAL MEDICINE

## 2023-07-12 PROCEDURE — 3078F DIAST BP <80 MM HG: CPT | Mod: CPTII,S$GLB,, | Performed by: INTERNAL MEDICINE

## 2023-07-12 PROCEDURE — 1159F MED LIST DOCD IN RCRD: CPT | Mod: CPTII,S$GLB,, | Performed by: INTERNAL MEDICINE

## 2023-07-12 PROCEDURE — 99215 OFFICE O/P EST HI 40 MIN: CPT | Mod: S$GLB,,, | Performed by: INTERNAL MEDICINE

## 2023-07-12 PROCEDURE — 99999 PR PBB SHADOW E&M-EST. PATIENT-LVL V: CPT | Mod: PBBFAC,,, | Performed by: INTERNAL MEDICINE

## 2023-07-12 PROCEDURE — 3077F SYST BP >= 140 MM HG: CPT | Mod: CPTII,S$GLB,, | Performed by: INTERNAL MEDICINE

## 2023-07-12 NOTE — PROGRESS NOTES
Shireen Felix is a 77 y.o. female for whom nephrology consult has been requested to evaluate and give opinion.   Renal clinic consult note:  Date of consult: 7/12/23  Reason for consult: HTN  Referring physician: Dr. Dye, cardiology  PCP: Dr. Brody Chaudhari    HPI: Thank you for referring the pt to us. H/o and chart were reviewed. Pt was seen and examined. Pt is a 78 y/o female with h/o of DM and HTN who presented for evaluation. Pt had previously been seen by Dr. Brooks. Pt has no c/o's today. She describes herself as mostly tired and sleepy. She has fallen a few times without experiencing a serious injury. Pt's issues in the past related to hyponatremia and hypokalemia reviewed. Pt is on a high dose of HCTZ 25 mg per day. Pt noted to be taking xyzal bid for allergies OTC. New addition of diltiazem for BP control by cardiology was noted. Pt has no h/o of arrhthymias. Pt feels well today other than feeling sleepy and tired. Pt says she takes her meds as prescribed.    PAST MEDICAL HISTORY: HTN, DM, Aneurysm, Arthritis, CAD, COPD, Glaucoma, Hypertension, Seizures, Shingles (05/27/2017), and Stroke.    PAST SURGICAL HISTORY:  She  has a past surgical history that includes Brain surgery; Hysterectomy; Transforaminal epidural injection of steroid (Bilateral, 7/23/2019); Cardiac catheterization; Coronary angioplasty; Epidural steroid injection into lumbar spine (Bilateral, 11/12/2019); Injection of anesthetic agent around medial branch nerves innervating lumbar facet joint (Bilateral, 1/31/2020); Transforaminal epidural injection of steroid (Bilateral, 3/10/2020); Transforaminal epidural injection of steroid (Right, 6/19/2020); Injection of joint (Bilateral, 7/9/2020); Injection of anesthetic agent into sacroiliac joint (Right, 11/16/2021); and Selective injection of anesthetic agent around lumbar spinal nerve root by transforaminal approach (Bilateral, 4/26/2023).    SOCIAL HISTORY:  She  reports that she quit  smoking about 19 years ago. Her smoking use included cigarettes. She started smoking about 61 years ago. She has a 42.00 pack-year smoking history. She has never used smokeless tobacco. She reports that she does not drink alcohol and does not use drugs.    FAMILY MEDICAL HISTORY:  Her family history includes No Known Problems in her father and mother.    Review of patient's allergies indicates:   Allergen Reactions    Penicillins Anaphylaxis, Hives, Shortness Of Breath and Swelling    Adhesive Rash    Doxycycline Nausea Only    Oxycodone Other (See Comments)     Fatigue      Sulfa (sulfonamide antibiotics) Itching           Prior to Admission medications    Medication Sig Start Date End Date Taking? Authorizing Provider   albuterol (PROVENTIL) 2.5 mg /3 mL (0.083 %) nebulizer solution Take 2.5 mg by nebulization every 6 (six) hours as needed for Wheezing. Rescue   Yes Historical Provider   albuterol (PROVENTIL/VENTOLIN HFA) 90 mcg/actuation inhaler INHALE TWO PUFFS INTO THE LUNGS EVERY 4 HOURS AS NEEDED 11/3/22  Yes Laron Chaudhari MD   amLODIPine (NORVASC) 10 MG tablet TAKE 1 TABLET BY MOUTH ONCE A DAY 11/3/22  Yes Laron Chaudhari MD   APTIOM 400 mg Tab tablet Take 400 mg by mouth. 6/22/23  Yes Historical Provider   atorvastatin (LIPITOR) 40 MG tablet TAKE 1 TABLET BY MOUTH ONCE DAILY 5/2/23  Yes Olivia Mcdowell NP   carvediloL (COREG) 25 MG tablet Take 1 tablet (25 mg total) by mouth 2 (two) times daily with meals. 6/27/23 6/26/24 Yes Indira Schumacher NP   cholecalciferol, vitamin D3, 1,250 mcg (50,000 unit) capsule Take 1 capsule (50,000 Units total) by mouth every 7 days. 5/3/23  Yes Julien Hernandez MD   clopidogreL (PLAVIX) 75 mg tablet Take 1 tablet (75 mg total) by mouth once daily. 2/22/23  Yes Laron Chaudhari MD   DALIRESP 500 mcg Tab TAKE 1 TABLET BY MOUTH ONCE DAILY 1/23/23  Yes Dyana Fishman NP   denosumab (PROLIA) 60 mg/mL Syrg every 6 (six) months. 7/2/18  Yes Historical  Provider   diltiaZEM (DILACOR XR) 120 MG CDCR Take 2 capsules (240 mg total) by mouth once daily. 6/30/23 6/29/24 Yes Indira Schumacher NP   dorzolamide-timolol 2-0.5% (COSOPT) 22.3-6.8 mg/mL ophthalmic solution Place 1 drop into both eyes every 12 (twelve) hours. 3/16/23  Yes Elie Hobbs MD   EScitalopram oxalate (LEXAPRO) 20 MG tablet TAKE 1 TABLET BY MOUTH ONCE DAILY 4/11/23  Yes Olivia Mcdowell NP   fluticasone propionate (FLONASE) 50 mcg/actuation nasal spray USE 2 SPRAYS INTO EACH NOSTRIL ONCE A DAY AT 6AM AS DIRECTED 1/18/23  Yes Dyana Fishman NP   fluticasone-umeclidin-vilanter (TRELEGY ELLIPTA) 200-62.5-25 mcg inhaler INHALE 1 PUFF BY MOUTH ONCE A DAY 1/18/23  Yes Dyana Fishman NP   ipratropium (ATROVENT) 42 mcg (0.06 %) nasal spray USE 2 SPRAYS INTO EACH NOSTRIL FOUR TIMES DAILY 1/18/23  Yes Dyana Fishman NP   isosorbide mononitrate (IMDUR) 60 MG 24 hr tablet Take 1 tablet (60 mg total) by mouth every evening. 6/12/23 6/11/24 Yes Jarrell Dye MD   ketoconazole (NIZORAL) 2 % cream Apply topically 2 (two) times daily. For dry flaky patches of ear. 4/11/22  Yes Ashley Westfall PA-C   latanoprost 0.005 % ophthalmic solution Place 1 drop into both eyes every evening. 3/29/22  Yes Elie Hobbs MD   levETIRAcetam (KEPPRA) 500 MG Tab Take 1 tablet (500 mg total) by mouth 2 (two) times daily. 5/22/23  Yes Emerita Simpson MD   magnesium oxide (MAG-OX) 400 mg (241.3 mg magnesium) tablet Take 1 tablet (400 mg total) by mouth once daily. 5/23/23  Yes Jarrell Dye MD   metFORMIN (GLUCOPHAGE) 500 MG tablet TAKE 1 TABLET BY MOUTH ONCE DAILY 11/3/22  Yes Laron Chaudhari MD   montelukast (SINGULAIR) 10 mg tablet TAKE 1 TABLET BY MOUTH ONCE A DAY 11/3/22  Yes Laron Chaudhari MD   MYRBETRIQ 50 mg Tb24 TAKE 1 TABLET BY MOUTH ONCE DAILY 6/22/23 7/22/23 Yes Donna Ramirez NP   nabumetone (RELAFEN) 500 MG tablet Take 1 tablet (500 mg total) by mouth 2 (two) times daily.  6/9/23  Yes Alanis Peres PA-C   nitroGLYCERIN (NITROSTAT) 0.4 MG SL tablet DISSOLVE 1 TABLET UNDER TONGUE EVERY 5 MINUTES FOR CHEST PAIN (MAY TAKE UP TO 3 DOSES THEN SEEK MEDICAL ATTENTION) 6/16/21  Yes Olivia Mcdowell NP   omeprazole (PRILOSEC) 40 MG capsule TAKE 1 CAPSULE BY MOUTH ONCE DAILY IN THE MORNING 5/26/23  Yes Laron Chaudhari MD   potassium chloride SA (K-DUR,KLOR-CON) 20 MEQ tablet Take 1 tablet (20 mEq total) by mouth once daily. Take extra dose with Lasix - fluid pill 5/23/23  Yes Jarrell Dye MD   ranolazine (RANEXA) 1,000 mg Tb12 Take 1 tablet (1,000 mg total) by mouth 2 (two) times daily. 9/13/22  Yes Laron Chaudhari MD   tiZANidine (ZANAFLEX) 4 MG tablet Take 1-1.5 tablets (4-6 mg total) by mouth 2 (two) times daily as needed (muscle spasms). May cause drowsiness. 5/16/23  Yes Alanis Peres PA-C   triamcinolone acetonide 0.025% (KENALOG) 0.025 % cream Apply topically 2 (two) times daily. PRN rash and itching of ear. Mild steroid cream. 4/11/22  Yes Ashley Westfall PA-C   valsartan (DIOVAN) 320 MG tablet Take 1 tablet (320 mg total) by mouth once daily. 11/14/19  Yes Jarrell Dye MD   furosemide (LASIX) 20 MG tablet Take 1 tablet (20 mg total) by mouth daily as needed (Take next 3 days for more fluid removal from CHF). Take next 3 days for more fluid removal from CHF 5/17/23 7/12/23 Yes Jarrell Dye MD   hydroCHLOROthiazide (HYDRODIURIL) 25 MG tablet TAKE 1 TABLET BY MOUTH ONCE DAILY AS NEEDED 5/9/22 7/12/23 Yes Olivia Mcdowell NP   nortriptyline (PAMELOR) 25 MG capsule Take 1 capsule (25 mg total) by mouth every evening. 10/4/22 6/30/23  Francisco Oshea MD   levocetirizine (XYZAL) 5 MG tablet Take 1 tablet (5 mg total) by mouth every evening. 6/1/22 7/12/23  Mariama Sam PA-C        REVIEW OF SYSTEMS:  Patient has no fever, fatigue, visual changes, chest pain, edema, cough, dyspnea, nausea, vomiting, constipation, diarrhea, arthralgias, pruritis,  "dizziness, weakness, depression, confusion.    PHYSICAL EXAM:   height is 5' 4" (1.626 m) and weight is 54 kg (119 lb 0.8 oz). Her blood pressure is 142/66 (abnormal) and her pulse is 88. Her respiration is 18.   Gen: WDWN female in no apparent distress  Psych: Normal mood and affect  Skin: No rashes or ulcers  Neck: No JVD  Chest: Clear with no rales, rhonchi, wheezing with normal effort  CV: Regular with no murmurs, gallops or rubs  Abd: Soft, nontender, no distension, positive bowel sounds  Ext: No edema    Labs reviewed  BMP  Lab Results   Component Value Date     07/06/2023    K 4.2 07/06/2023     07/06/2023    CO2 22 (L) 07/06/2023    BUN 11 07/06/2023    CREATININE 0.9 07/06/2023    CALCIUM 8.9 07/06/2023    ANIONGAP 11 07/06/2023    EGFRNORACEVR >60.0 07/06/2023     Lab Results   Component Value Date    WBC 5.49 07/06/2023    HGB 11.3 (L) 07/06/2023    HCT 36.5 (L) 07/06/2023    MCV 95 07/06/2023     07/06/2023     U/a: 1+protein, neg blood      IMPRESSION AND RECOMMENDATIONS:  78 y/o female with h/o of HTN presented for evaluation of past labile BP's:    Renal: h/o of HTN.   s Cr is normal. Normal renal function.  K normal  Not alkalemic  No hematuria  Mild proteinuria, likely related to DM/diabetic nephropathy  No sign of resistant HTN.  Secondary HTN is not suspected    Agree with current reviewed BP meds. Continue  However, pt was advised that diltiazem can cause fatigue    2. Past electrolyte deficiencies (Na and K). Both currently normal  Advised pt to d/c HCTZ  Also don'st take lasix  No sign of CHF on echo  No sign of fluid overload today, in fact appears "dry"    3. Medication review: was done  Noted pt's report of sleepiness.  Suspect due to excessive dose of xyzal (combined H1 and H2 blocker). Advised pt to stop and use a non-sedative anti-histamine (zyrtec)    Plans and recommendations:  As discussed above  Total time spent 40 minutes including time needed to review the " records, the   patient evaluation, documentation, face-to-face discussion with the patient,   more than 50% of the time was spent on coordination of care and counseling.    Level V visit.  Consult note sent to the referring physician:  RTC 6 months    Franck Mueller MD

## 2023-07-12 NOTE — TELEPHONE ENCOUNTER
Patient came in for nurse visit b/p check this morning. B/p on left arm wass 132/74 P 86. Please advise

## 2023-07-13 ENCOUNTER — TELEPHONE (OUTPATIENT)
Dept: CARDIOLOGY | Facility: CLINIC | Age: 78
End: 2023-07-13
Payer: MEDICARE

## 2023-07-13 NOTE — TELEPHONE ENCOUNTER
Mona Julien. Will continue with current regimen. I reviewed Dr. Vega recommendations from todays visit as well and will try holding the Lasix and HCTZ.       Spoke to patient's brother and informed of above. He verbalized an understanding and will relay the message to his sister.

## 2023-07-17 DIAGNOSIS — I25.118 CORONARY ARTERY DISEASE OF NATIVE ARTERY OF NATIVE HEART WITH STABLE ANGINA PECTORIS: ICD-10-CM

## 2023-07-17 RX ORDER — MIRABEGRON 50 MG/1
TABLET, FILM COATED, EXTENDED RELEASE ORAL
Qty: 30 TABLET | Refills: 0 | Status: SHIPPED | OUTPATIENT
Start: 2023-07-17 | End: 2023-12-22

## 2023-07-19 RX ORDER — TIZANIDINE 4 MG/1
TABLET ORAL
Qty: 60 TABLET | Refills: 5 | Status: ON HOLD | OUTPATIENT
Start: 2023-07-19 | End: 2023-08-16 | Stop reason: HOSPADM

## 2023-07-19 RX ORDER — RANOLAZINE 1000 MG/1
TABLET, EXTENDED RELEASE ORAL
Qty: 180 TABLET | Refills: 3 | Status: SHIPPED | OUTPATIENT
Start: 2023-07-19

## 2023-07-21 NOTE — PRE-PROCEDURE INSTRUCTIONS
Spoke with patient regarding procedure scheduled on 7.28     Arrival time 0915     Has patient been sick with fever or on antibiotics within the last 7 days? No     Does the patient have any open wounds, sores or rashes? No     Does the patient have any recent fractures? no     Has patient received a vaccination within the last 7 days? No     Received the COVID vaccination? yes     Has the patient stopped all medications as directed? hold dm meds am of procedure     Does patient have a pacemaker and or defibrillator? no     Does the patient have a ride to and from procedure and someone reliable to remain with patient? brother     Is the patient diabetic? yes     Does the patient have sleep apnea? Or use O2 at home? no     Is the patient receiving sedation? yes     Is the patient instructed to remain NPO beginning at midnight the night before their procedure? yes     Procedure location confirmed with patient? Yes     Covid- Denies signs/symptoms. Instructed to notify PAT/MD if any changes.

## 2023-07-25 DIAGNOSIS — I25.118 CORONARY ARTERY DISEASE OF NATIVE ARTERY OF NATIVE HEART WITH STABLE ANGINA PECTORIS: ICD-10-CM

## 2023-07-25 RX ORDER — ATORVASTATIN CALCIUM 40 MG/1
TABLET, FILM COATED ORAL
Qty: 90 TABLET | Refills: 1 | Status: SHIPPED | OUTPATIENT
Start: 2023-07-25

## 2023-07-25 NOTE — TELEPHONE ENCOUNTER
Care Due:                  Date            Visit Type   Department     Provider  --------------------------------------------------------------------------------                                EP -                              PRIMARY      [facility] INTER[facility]  Last Visit: 03-      CARE ([facility])   MEDICINE       Laron Chaudhari  Next Visit: None Scheduled  None         None Found                                                            Last  Test          Frequency    Reason                     Performed    Due Date  --------------------------------------------------------------------------------    HBA1C.......  6 months...  metFORMIN................  10-   04-    [facility] Embedded Care Due Messages. Reference number: [id number].   7/25/2023 10:36:17 AM CDT

## 2023-07-25 NOTE — TELEPHONE ENCOUNTER
Refill Decision Note   Shireen Felix  is requesting a refill authorization.  Brief Assessment and Rationale for Refill:  Approve     Medication Therapy Plan:       Medication Reconciliation Completed: No   Comments:     Provider Staff:     Action is required for this patient.   Please see care gap opportunities below in Care Due Message.     Thanks!  [facility]     Appointments      Date Provider   Last Visit   3/3/2023 Laron Chaudhari MD   Next Visit   Visit date not found Laron Chaudhari MD     Note composed:10:49 AM 07/25/2023           Note composed:10:49 AM 07/25/2023

## 2023-07-26 ENCOUNTER — TELEPHONE (OUTPATIENT)
Dept: INTERNAL MEDICINE | Facility: CLINIC | Age: 78
End: 2023-07-26
Payer: MEDICARE

## 2023-07-26 ENCOUNTER — TELEPHONE (OUTPATIENT)
Dept: PAIN MEDICINE | Facility: CLINIC | Age: 78
End: 2023-07-26
Payer: MEDICARE

## 2023-07-26 NOTE — TELEPHONE ENCOUNTER
----- Message from Lindsey Magana sent at 7/26/2023  8:07 AM CDT -----  Contact: Rm Abdalla called to confirm the arrival time for the pts procedure on 7/28. Please call him back at [phone].    Thanks  TS

## 2023-07-26 NOTE — TELEPHONE ENCOUNTER
----- Message from Chey Granados sent at 7/25/2023  4:34 PM CDT -----  Contact: Shireen Dao is calling in regards to getting her mammo order put in so that she get an appt.please call back at [phone]        Thanks  AVE

## 2023-07-27 ENCOUNTER — OFFICE VISIT (OUTPATIENT)
Dept: OPHTHALMOLOGY | Facility: CLINIC | Age: 78
End: 2023-07-27
Attending: ANESTHESIOLOGY
Payer: MEDICARE

## 2023-07-27 DIAGNOSIS — E11.9 DIABETES MELLITUS TYPE 2 WITHOUT RETINOPATHY: ICD-10-CM

## 2023-07-27 DIAGNOSIS — H17.9 CORNEAL SCAR, LEFT EYE: ICD-10-CM

## 2023-07-27 DIAGNOSIS — Z96.1 PSEUDOPHAKIA OF BOTH EYES: ICD-10-CM

## 2023-07-27 DIAGNOSIS — H40.1132 PRIMARY OPEN ANGLE GLAUCOMA (POAG) OF BOTH EYES, MODERATE STAGE: Primary | ICD-10-CM

## 2023-07-27 DIAGNOSIS — H52.7 REFRACTIVE ERROR: ICD-10-CM

## 2023-07-27 DIAGNOSIS — H04.129 DRY EYE: ICD-10-CM

## 2023-07-27 LAB
LEFT EYE DM RETINOPATHY: NEGATIVE
RIGHT EYE DM RETINOPATHY: NEGATIVE

## 2023-07-27 PROCEDURE — 92015 PR REFRACTION: ICD-10-PCS | Mod: S$GLB,,, | Performed by: OPHTHALMOLOGY

## 2023-07-27 PROCEDURE — 92083 EXTENDED VISUAL FIELD XM: CPT | Mod: S$GLB,,, | Performed by: OPHTHALMOLOGY

## 2023-07-27 PROCEDURE — 99214 OFFICE O/P EST MOD 30 MIN: CPT | Mod: S$GLB,,, | Performed by: OPHTHALMOLOGY

## 2023-07-27 PROCEDURE — 1159F MED LIST DOCD IN RCRD: CPT | Mod: CPTII,S$GLB,, | Performed by: OPHTHALMOLOGY

## 2023-07-27 PROCEDURE — 1159F PR MEDICATION LIST DOCUMENTED IN MEDICAL RECORD: ICD-10-PCS | Mod: CPTII,S$GLB,, | Performed by: OPHTHALMOLOGY

## 2023-07-27 PROCEDURE — 92083 HUMPHREY VISUAL FIELD - OU - BOTH EYES: ICD-10-PCS | Mod: S$GLB,,, | Performed by: OPHTHALMOLOGY

## 2023-07-27 PROCEDURE — 92015 DETERMINE REFRACTIVE STATE: CPT | Mod: S$GLB,,, | Performed by: OPHTHALMOLOGY

## 2023-07-27 PROCEDURE — 92133 CPTRZD OPH DX IMG PST SGM ON: CPT | Mod: S$GLB,,, | Performed by: OPHTHALMOLOGY

## 2023-07-27 PROCEDURE — 1160F PR REVIEW ALL MEDS BY PRESCRIBER/CLIN PHARMACIST DOCUMENTED: ICD-10-PCS | Mod: CPTII,S$GLB,, | Performed by: OPHTHALMOLOGY

## 2023-07-27 PROCEDURE — 2023F PR DILATED RETINAL EXAM W/O EVID OF RETINOPATHY: ICD-10-PCS | Mod: CPTII,S$GLB,, | Performed by: OPHTHALMOLOGY

## 2023-07-27 PROCEDURE — 1160F RVW MEDS BY RX/DR IN RCRD: CPT | Mod: CPTII,S$GLB,, | Performed by: OPHTHALMOLOGY

## 2023-07-27 PROCEDURE — 99999 PR PBB SHADOW E&M-EST. PATIENT-LVL III: ICD-10-PCS | Mod: PBBFAC,,, | Performed by: OPHTHALMOLOGY

## 2023-07-27 PROCEDURE — 99214 PR OFFICE/OUTPT VISIT, EST, LEVL IV, 30-39 MIN: ICD-10-PCS | Mod: S$GLB,,, | Performed by: OPHTHALMOLOGY

## 2023-07-27 PROCEDURE — 92133 POSTERIOR SEGMENT OCT OPTIC NERVE(OCULAR COHERENCE TOMOGRAPHY) - OU - BOTH EYES: ICD-10-PCS | Mod: S$GLB,,, | Performed by: OPHTHALMOLOGY

## 2023-07-27 PROCEDURE — 99999 PR PBB SHADOW E&M-EST. PATIENT-LVL III: CPT | Mod: PBBFAC,,, | Performed by: OPHTHALMOLOGY

## 2023-07-27 PROCEDURE — 2023F DILAT RTA XM W/O RTNOPTHY: CPT | Mod: CPTII,S$GLB,, | Performed by: OPHTHALMOLOGY

## 2023-07-27 NOTE — PROGRESS NOTES
SUBJECTIVE  Jetty Veronica Felix is 77 y.o. female  Corrected distance visual acuity was 20/30 in the right eye and 20/40 in the left eye.   No chief complaint on file.         HPI    States that her vision seems to be getting worse for her near vision and   that she has been compliant with gtts as directed. States that her eyes   have been really watery and has matting in the morning and itching.     1. Mod COAG goal = 18-19 +Fhx coag-mother   Pt states she has had elevated pressures in the past.   2. 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 ERM OD  CR scar OD  4. DM-2005  5. PCIOL OU? -done in [place]  6. Dry Eyes  7. Refractive error PAL Rx    Latanoprost ou qhs  Dorzolamide/Timolol BID OU    AT's prn OU    Last edited by Ashley Hyatt on 7/27/2023 11:22 AM.         Assessment /Plan :  1. Primary open angle glaucoma (POAG) of both eyes, moderate stage Doing well, intraocular pressure (IOP) within acceptable range relative to target IOP and no evidence of progression. Continue current treatment. Reviewed importance of continued compliance with treatment and follow up.      Patient instructed to continue using the following glaucoma medication as follows:  Latanoprost one drop in each eye nightly and Dorzolamide/Timolol (Cosopt) one drop in each eye every 12 hours    Return to clinic in 4 months  or as needed.  With IOP Check     2. Diabetes mellitus type 2 without retinopathy No diabetic retinopathy at this time. Reviewed diabetic eye precautions including avoiding tobacco use,  Good glucose control, and importance of regular follow up.      3. Pseudophakia of both eyes  -- Condition stable, no therapeutic change required. Monitoring routinely.     4. Corneal scar, left eye  -- Condition stable, no therapeutic change required. Monitoring routinely.     5. Dry eye Findings and symptoms consistent with moderate dry eyes. Dry eye instruction sheet reviewed with  patient recommending:AT's qid/titrate prn     6. Refractive error - Dispensed PAL Rx

## 2023-07-28 ENCOUNTER — HOSPITAL ENCOUNTER (OUTPATIENT)
Facility: HOSPITAL | Age: 78
Discharge: HOME | End: 2023-07-28
Attending: ANESTHESIOLOGY | Admitting: ANESTHESIOLOGY
Payer: MEDICARE

## 2023-07-28 VITALS
RESPIRATION RATE: 16 BRPM | WEIGHT: 119.06 LBS | HEIGHT: 64 IN | OXYGEN SATURATION: 100 % | HEART RATE: 77 BPM | TEMPERATURE: 98 F | SYSTOLIC BLOOD PRESSURE: 152 MMHG | DIASTOLIC BLOOD PRESSURE: 77 MMHG | BODY MASS INDEX: 20.32 KG/M2

## 2023-07-28 DIAGNOSIS — M53.3 SACROILIAC JOINT PAIN: ICD-10-CM

## 2023-07-28 DIAGNOSIS — M46.1 SACROILIITIS: ICD-10-CM

## 2023-07-28 LAB — POCT GLUCOSE: 100 MG/DL (ref 70–110)

## 2023-07-28 PROCEDURE — 63600175 PHARM REV CODE 636 W HCPCS: Performed by: ANESTHESIOLOGY

## 2023-07-28 PROCEDURE — 20610 PR DRAIN/INJECT LARGE JOINT/BURSA: ICD-10-PCS | Mod: 50,59,, | Performed by: ANESTHESIOLOGY

## 2023-07-28 PROCEDURE — 27096 PR INJECTION,SACROILIAC JOINT: ICD-10-PCS | Mod: 50,,, | Performed by: ANESTHESIOLOGY

## 2023-07-28 PROCEDURE — 27096 INJECT SACROILIAC JOINT: CPT | Mod: 50 | Performed by: ANESTHESIOLOGY

## 2023-07-28 PROCEDURE — 20610 DRAIN/INJ JOINT/BURSA W/O US: CPT | Mod: 50 | Performed by: ANESTHESIOLOGY

## 2023-07-28 PROCEDURE — 25000003 PHARM REV CODE 250: Performed by: ANESTHESIOLOGY

## 2023-07-28 PROCEDURE — 27096 INJECT SACROILIAC JOINT: CPT | Mod: 50,,, | Performed by: ANESTHESIOLOGY

## 2023-07-28 PROCEDURE — 20610 DRAIN/INJ JOINT/BURSA W/O US: CPT | Mod: 50,59,, | Performed by: ANESTHESIOLOGY

## 2023-07-28 PROCEDURE — 27096 INJECT SACROILIAC JOINT: CPT | Mod: LT | Performed by: ANESTHESIOLOGY

## 2023-07-28 PROCEDURE — 25500020 PHARM REV CODE 255: Performed by: ANESTHESIOLOGY

## 2023-07-28 RX ORDER — FENTANYL CITRATE 50 UG/ML
INJECTION, SOLUTION INTRAMUSCULAR; INTRAVENOUS
Status: DISCONTINUED | OUTPATIENT
Start: 2023-07-28 | End: 2023-07-28 | Stop reason: HOSPADM

## 2023-07-28 RX ORDER — TRIAMCINOLONE ACETONIDE 40 MG/ML
INJECTION, SUSPENSION INTRA-ARTICULAR; INTRAMUSCULAR
Status: DISCONTINUED | OUTPATIENT
Start: 2023-07-28 | End: 2023-07-28 | Stop reason: HOSPADM

## 2023-07-28 RX ORDER — SODIUM BICARBONATE 1 MEQ/ML
SYRINGE (ML) INTRAVENOUS
Status: DISCONTINUED | OUTPATIENT
Start: 2023-07-28 | End: 2023-07-28 | Stop reason: HOSPADM

## 2023-07-28 RX ORDER — MIDAZOLAM HYDROCHLORIDE 1 MG/ML
INJECTION, SOLUTION INTRAMUSCULAR; INTRAVENOUS
Status: DISCONTINUED | OUTPATIENT
Start: 2023-07-28 | End: 2023-07-28 | Stop reason: HOSPADM

## 2023-07-28 RX ORDER — BUPIVACAINE HYDROCHLORIDE 2.5 MG/ML
INJECTION, SOLUTION EPIDURAL; INFILTRATION; INTRACAUDAL
Status: DISCONTINUED | OUTPATIENT
Start: 2023-07-28 | End: 2023-07-28 | Stop reason: HOSPADM

## 2023-07-28 NOTE — DISCHARGE INSTRUCTIONS

## 2023-07-28 NOTE — H&P
HPI  Patient presenting for Procedure(s) (LRB):  Bilateral GT bursa + bilateral SIJ injection NEEDS ADDITIONAL SEDATION AND MORE TIME BEFORE INJECTION RN IV Sedation (Bilateral)  Bilateral GT bursa + bilateral SIJ injection (Bilateral)     Patient on Anti-coagulation No    No health changes since previous encounter    Past Medical History:   Diagnosis Date    Abnormal ECG 8/5/2019    Aneurysm     Anticoagulant long-term use     Arthritis     CAD in native artery 8/5/2019    COPD (chronic obstructive pulmonary disease)     Diabetes mellitus     Fall 10/10/2019    Formatting of this note might be different from the original. Found sitting on floor next to bed last night Mild confusion today Does not recall falling or how she ended up on floor UA today Labs yesterday unremarkable    Glaucoma     Hypertension     Seizures     Shingles 05/27/2017    Stroke      Past Surgical History:   Procedure Laterality Date    BRAIN SURGERY      CARDIAC CATHETERIZATION      CORONARY ANGIOPLASTY      EPIDURAL STEROID INJECTION INTO LUMBAR SPINE Bilateral 11/12/2019    Procedure: TF XANDER L4/5;  Surgeon: Desean Dias MD;  Location: [facility];  Service: Pain Management;  Laterality: Bilateral;    HYSTERECTOMY      INJECTION OF ANESTHETIC AGENT AROUND MEDIAL BRANCH NERVES INNERVATING LUMBAR FACET JOINT Bilateral 1/31/2020    Procedure: Bilateral L3-5 MBB;  Surgeon: Desean Dias MD;  Location: [facility];  Service: Pain Management;  Laterality: Bilateral;    INJECTION OF ANESTHETIC AGENT INTO SACROILIAC JOINT Right 11/16/2021    Procedure: Right BLOCK, SACROILIAC JOINT Right GTB with RN IV sedation;  Surgeon: Yao Fulton MD;  Location: [facility];  Service: Pain Management;  Laterality: Right;    INJECTION OF JOINT Bilateral 7/9/2020    Procedure: Bilateral shoulder GH Joint injection with local;  Surgeon: Desean Dias MD;  Location: [facility];  Service: Pain Management;  Laterality: Bilateral;    SELECTIVE  Op note  Operative hysteroscopy  Findings 2 areas of filmy material,, one fundal ,the other low in posterior uterine cavity  Excised with the Smith NephESTmob morcellator  Fluid deficit 200 c.c.  ebl 10 c.c.  Pt was having a heavy flow at the time, but visualization was excellent   INJECTION OF ANESTHETIC AGENT AROUND LUMBAR SPINAL NERVE ROOT BY TRANSFORAMINAL APPROACH Bilateral 4/26/2023    Procedure: Bilateral L4/5 TF XANDER RN IV Sedation;  Surgeon: Francisco Oseha MD;  Location: [facility];  Service: Pain Management;  Laterality: Bilateral;    TRANSFORAMINAL EPIDURAL INJECTION OF STEROID Bilateral 7/23/2019    Procedure: Bilateral L3/4 Transforaminal Epidural Steroid Injection;  Surgeon: Desean Dias MD;  Location: [facility];  Service: Pain Management;  Laterality: Bilateral;    TRANSFORAMINAL EPIDURAL INJECTION OF STEROID Bilateral 3/10/2020    Procedure: Right T12/L1 TF XANDER with local;  Surgeon: Desean Dias MD;  Location: [facility];  Service: Pain Management;  Laterality: Bilateral;    TRANSFORAMINAL EPIDURAL INJECTION OF STEROID Right 6/19/2020    Procedure: Right T12/L1 TFESI Covid day of procedure;  Surgeon: Desean Dias MD;  [facility];  Service: Pain Management;  Laterality: Right;     Review of patient's allergies indicates:   Allergen Reactions    Penicillins Anaphylaxis, Hives, Shortness Of Breath and Swelling    Adhesive Rash    Doxycycline Nausea Only    Oxycodone Other (See Comments)     Fatigue      Sulfa (sulfonamide antibiotics) Itching        No current facility-administered medications on file prior to encounter.     Current Outpatient Medications on File Prior to Encounter   Medication Sig Dispense Refill    albuterol (PROVENTIL) 2.5 mg /3 mL (0.083 %) nebulizer solution Take 2.5 mg by nebulization every 6 (six) hours as needed for Wheezing. Rescue      albuterol (PROVENTIL/VENTOLIN HFA) 90 mcg/actuation inhaler INHALE TWO PUFFS INTO THE LUNGS EVERY 4 HOURS AS NEEDED 18 g 11    amLODIPine (NORVASC) 10 MG tablet TAKE 1 TABLET BY MOUTH ONCE A DAY 90 tablet 3    clopidogreL (PLAVIX) 75 mg tablet Take 1 tablet (75 mg total) by mouth once daily. 90 tablet 3    DALIRESP 500 mcg Tab TAKE 1 TABLET BY MOUTH ONCE DAILY 90 tablet 3    denosumab  (PROLIA) 60 mg/mL Syrg every 6 (six) months.      dorzolamide-timolol 2-0.5% (COSOPT) 22.3-6.8 mg/mL ophthalmic solution Place 1 drop into both eyes every 12 (twelve) hours. 10 mL 12    EScitalopram oxalate (LEXAPRO) 20 MG tablet TAKE 1 TABLET BY MOUTH ONCE DAILY 90 tablet 1    fluticasone propionate (FLONASE) 50 mcg/actuation nasal spray USE 2 SPRAYS INTO EACH NOSTRIL ONCE A DAY AT 6AM AS DIRECTED 16 g 11    fluticasone-umeclidin-vilanter (TRELEGY ELLIPTA) 200-62.5-25 mcg inhaler INHALE 1 PUFF BY MOUTH ONCE A DAY 60 each 11    ipratropium (ATROVENT) 42 mcg (0.06 %) nasal spray USE 2 SPRAYS INTO EACH NOSTRIL FOUR TIMES DAILY 15 mL 11    ketoconazole (NIZORAL) 2 % cream Apply topically 2 (two) times daily. For dry flaky patches of ear. 30 g 1    latanoprost 0.005 % ophthalmic solution Place 1 drop into both eyes every evening. 2.5 mL 12    levETIRAcetam (KEPPRA) 500 MG Tab Take 1 tablet (500 mg total) by mouth 2 (two) times daily. 180 tablet 3    magnesium oxide (MAG-OX) 400 mg (241.3 mg magnesium) tablet Take 1 tablet (400 mg total) by mouth once daily. 90 tablet 1    metFORMIN (GLUCOPHAGE) 500 MG tablet TAKE 1 TABLET BY MOUTH ONCE DAILY 90 tablet 3    montelukast (SINGULAIR) 10 mg tablet TAKE 1 TABLET BY MOUTH ONCE A DAY 90 tablet 3    nabumetone (RELAFEN) 500 MG tablet Take 1 tablet (500 mg total) by mouth 2 (two) times daily. 60 tablet 2    nitroGLYCERIN (NITROSTAT) 0.4 MG SL tablet DISSOLVE 1 TABLET UNDER TONGUE EVERY 5 MINUTES FOR CHEST PAIN (MAY TAKE UP TO 3 DOSES THEN SEEK MEDICAL ATTENTION) 25 tablet 0    nortriptyline (PAMELOR) 25 MG capsule Take 1 capsule (25 mg total) by mouth every evening. 90 capsule 0    omeprazole (PRILOSEC) 40 MG capsule TAKE 1 CAPSULE BY MOUTH ONCE DAILY IN THE MORNING 90 capsule 3    potassium chloride SA (K-DUR,KLOR-CON) 20 MEQ tablet Take 1 tablet (20 mEq total) by mouth once daily. Take extra dose with Lasix - fluid pill 90 tablet 1    triamcinolone acetonide 0.025% (KENALOG)  0.025 % cream Apply topically 2 (two) times daily. PRN rash and itching of ear. Mild steroid cream. 30 g 0    valsartan (DIOVAN) 320 MG tablet Take 1 tablet (320 mg total) by mouth once daily. 30 tablet 3        PMHx, PSHx, Allergies, Medications reviewed in epic    ROS negative except pain complaints in HPI    OBJECTIVE:    LMP  (LMP Unknown)     PHYSICAL EXAMINATION:    GENERAL: Well appearing, in no acute distress, alert and oriented x3.  PSYCH:  Mood and affect appropriate.  SKIN: Skin color, texture, turgor normal, no rashes or lesions which will impact the procedure.  CV: RRR with palpation of the radial artery.  PULM: No evidence of respiratory difficulty, symmetric chest rise. Clear to auscultation.  NEURO: Cranial nerves grossly intact.    Plan:    Proceed with procedure as planned Procedure(s) (LRB):  Bilateral GT bursa + bilateral SIJ injection NEEDS ADDITIONAL SEDATION AND MORE TIME BEFORE INJECTION RN IV Sedation (Bilateral)  Bilateral GT bursa + bilateral SIJ injection (Bilateral)    Francisco Oshea MD  07/28/2023

## 2023-07-28 NOTE — OP NOTE
Shireen Felix  77 y.o. female      Vitals:    07/28/23 0949   BP: 139/63   Pulse: 71   Resp: 15   Temp:        Procedure Date: 07/28/2023        INFORMED CONSENT: The procedure, risks, benefits and options were discussed with patient. There are no contraindications to the procedure. The patient expressed understanding and agreed to proceed. The personnel performing the procedure was discussed. I verify that I personally obtained consent prior to the start of the procedure and the signed consent can be found on the patient's chart.       Anesthesia:   Conscious sedation provided by M.D    The patient was monitored with continuous pulse oximetry, EKG, and intermittent blood pressure monitors.  The patient was hemodynamically stable throughout the entire process was responsive to voice, and breathing spontaneously.  Supplemental O2 was provided at 2L/min via nasal cannula.  Patient was comfortable for the duration of the procedure. (See nurse documentation and case log for sedation time)    There was a total of 2mg IV Midazolam and 75mcg Fentanyl titrated for the procedure    Pre Procedure diagnosis: Sacroiliac joint pain [M53.3]  Post-Procedure diagnosis: SAME      PROCEDURE:  1) Bilateral greater trochanteric bursa injection    2) Bilateral sacroiliac joint injection                            REASON FOR PROCEDURE:   Sacroiliitis [M46.1]  Greater trochanteric bursitis[M70.61]      MEDICATIONS INJECTED: 1mL 40mg/ml Kenalog and 4mL Bupivacaine 0.25% into each site    LOCAL ANESTHETIC USED: Xylocaine 1% 6ml     ESTIMATED BLOOD LOSS: None.   COMPLICATIONS: None.     TECHNIQUE:   Greater trochanteric bursa injection:  The area overlying the greater trochanteric bursa was identified using fluoroscopy, and the area overlying the skin was prepped and draped in usual sterile fashion. Local Xylocaine was injected by raising a wheel and going down to the periosteum using a 27-gauge hypodermic needle. A 5 inch 22-gauge  spinal needle was introduce into the Bilateral greater trochanteric bursa. Negative pressure applied to confirm no intravascular placement. Omnipaque was injected to confirm placement and to confirm that there was no vascular runoff. The medication was then injected slowly.  Displacement of the contrast after injection of the medication confirmed that the medication went into the area of the greater trochanteric bursa    Sacroiliac joint injection:   Laying in the prone position, the patient was prepped and draped in the usual sterile fashion using ChloraPrep and fenestrated drape.  The area was determined under fluoroscopy.  Local Xylocaine was injected by raising a wheel and going down to the periosteum using a 27-gauge hypodermic needle.  The 3.5 inch 22-gauge spinal needle was introduce into the Bilateral sacroiliac joint.  Negative pressure applied to confirm no intravascular placement.  Omnipaque was injected to confirm placement and to confirm that there was no vascular runoff.  The medication was then injected slowly.  The patient tolerated the procedure well.                       The patient was monitored for approximately 30 minutes after the procedure. Patient was given post procedure and discharge instructions to follow at home. We will see the patient back in two weeks or the patient may call to inform of status. The patient was discharged in a stable condition

## 2023-07-28 NOTE — DISCHARGE SUMMARY
Discharge Note  Short Stay      SUMMARY     Admit Date: 7/28/2023    Attending Physician: Francisco Oshea MD        Discharge Physician: Francisco Oshea MD        Discharge Date: 7/28/2023 9:51 AM    Procedure(s) (LRB):  Bilateral GT bursa + bilateral SIJ injection NEEDS ADDITIONAL SEDATION AND MORE TIME BEFORE INJECTION RN IV Sedation (Bilateral)  Bilateral GT bursa + bilateral SIJ injection (Bilateral)    Final Diagnosis: Sacroiliac joint pain [M53.3]    Disposition: Home or self care    Patient Instructions:   Current Discharge Medication List        CONTINUE these medications which have NOT CHANGED    Details   amLODIPine (NORVASC) 10 MG tablet TAKE 1 TABLET BY MOUTH ONCE A DAY  Qty: 90 tablet, Refills: 3    Associated Diagnoses: Essential hypertension      APTIOM 400 mg Tab tablet Take 400 mg by mouth.      atorvastatin (LIPITOR) 40 MG tablet TAKE 1 TABLET BY MOUTH ONCE DAILY  Qty: 90 tablet, Refills: 1    Associated Diagnoses: Coronary artery disease of native artery of native heart with stable angina pectoris      carvediloL (COREG) 25 MG tablet Take 1 tablet (25 mg total) by mouth 2 (two) times daily with meals.  Qty: 60 tablet, Refills: 11    Comments: .  Associated Diagnoses: Primary hypertension      clopidogreL (PLAVIX) 75 mg tablet Take 1 tablet (75 mg total) by mouth once daily.  Qty: 90 tablet, Refills: 3    Associated Diagnoses: Coronary artery disease of native artery of native heart with stable angina pectoris      diltiaZEM (CARDIZEM CD) 120 MG Cp24 Take 2 capsules (240 mg total) by mouth once daily.  Qty: 180 capsule, Refills: 1    Comments: This prescription was filled on 7/19/2023. Any refills authorized will be placed on file.  Associated Diagnoses: Primary hypertension      isosorbide mononitrate (IMDUR) 60 MG 24 hr tablet Take 1 tablet (60 mg total) by mouth every evening.  Qty: 90 tablet, Refills: 1      levETIRAcetam (KEPPRA) 500 MG Tab Take 1 tablet (500 mg total) by mouth 2 (two) times  daily.  Qty: 180 tablet, Refills: 3      magnesium oxide (MAG-OX) 400 mg (241.3 mg magnesium) tablet Take 1 tablet (400 mg total) by mouth once daily.  Qty: 90 tablet, Refills: 1      metFORMIN (GLUCOPHAGE) 500 MG tablet TAKE 1 TABLET BY MOUTH ONCE DAILY  Qty: 90 tablet, Refills: 3      montelukast (SINGULAIR) 10 mg tablet TAKE 1 TABLET BY MOUTH ONCE A DAY  Qty: 90 tablet, Refills: 3    Comments: This prescription was filled on 11/2/2022. Any refills authorized will be placed on file.  Associated Diagnoses: Chronic allergic rhinitis      MYRBETRIQ 50 mg Tb24 TAKE 1 TABLET BY MOUTH ONCE DAILY  Qty: 30 tablet, Refills: 0      nabumetone (RELAFEN) 500 MG tablet Take 1 tablet (500 mg total) by mouth 2 (two) times daily.  Qty: 60 tablet, Refills: 2      nortriptyline (PAMELOR) 25 MG capsule Take 1 capsule (25 mg total) by mouth every evening.  Qty: 90 capsule, Refills: 0      omeprazole (PRILOSEC) 40 MG capsule TAKE 1 CAPSULE BY MOUTH ONCE DAILY IN THE MORNING  Qty: 90 capsule, Refills: 3      ranolazine (RANEXA) 1,000 mg Tb12 TAKE 1 TABLET BY MOUTH TWICE DAILY  Qty: 180 tablet, Refills: 3    Associated Diagnoses: Coronary artery disease of native artery of native heart with stable angina pectoris      valsartan (DIOVAN) 320 MG tablet Take 1 tablet (320 mg total) by mouth once daily.  Qty: 30 tablet, Refills: 3    Associated Diagnoses: Essential hypertension      albuterol (PROVENTIL) 2.5 mg /3 mL (0.083 %) nebulizer solution Take 2.5 mg by nebulization every 6 (six) hours as needed for Wheezing. Rescue      albuterol (PROVENTIL/VENTOLIN HFA) 90 mcg/actuation inhaler INHALE TWO PUFFS INTO THE LUNGS EVERY 4 HOURS AS NEEDED  Qty: 18 g, Refills: 11    Comments: This prescription was filled on 11/2/2022. Any refills authorized will be placed on file.  Associated Diagnoses: Mucopurulent chronic bronchitis      cholecalciferol, vitamin D3, 1,250 mcg (50,000 unit) capsule Take 1 capsule (50,000 Units total) by mouth every 7  days.  Qty: 12 capsule, Refills: 0    Associated Diagnoses: Vitamin D insufficiency      DALIRESP 500 mcg Tab TAKE 1 TABLET BY MOUTH ONCE DAILY  Qty: 90 tablet, Refills: 3      denosumab (PROLIA) 60 mg/mL Syrg every 6 (six) months.      dorzolamide-timolol 2-0.5% (COSOPT) 22.3-6.8 mg/mL ophthalmic solution Place 1 drop into both eyes every 12 (twelve) hours.  Qty: 10 mL, Refills: 12    Associated Diagnoses: Primary open angle glaucoma (POAG) of both eyes, moderate stage      EScitalopram oxalate (LEXAPRO) 20 MG tablet TAKE 1 TABLET BY MOUTH ONCE DAILY  Qty: 90 tablet, Refills: 1    Comments: This prescription was filled on 3/31/2023. Any refills authorized will be placed on file.  Associated Diagnoses: Moderate recurrent major depression      fluticasone propionate (FLONASE) 50 mcg/actuation nasal spray USE 2 SPRAYS INTO EACH NOSTRIL ONCE A DAY AT 6AM AS DIRECTED  Qty: 16 g, Refills: 11    Associated Diagnoses: Non-seasonal allergic rhinitis due to other allergic trigger      fluticasone-umeclidin-vilanter (TRELEGY ELLIPTA) 200-62.5-25 mcg inhaler INHALE 1 PUFF BY MOUTH ONCE A DAY  Qty: 60 each, Refills: 11    Associated Diagnoses: Moderate persistent asthma without complication      ipratropium (ATROVENT) 42 mcg (0.06 %) nasal spray USE 2 SPRAYS INTO EACH NOSTRIL FOUR TIMES DAILY  Qty: 15 mL, Refills: 11    Associated Diagnoses: Non-seasonal allergic rhinitis due to other allergic trigger      ketoconazole (NIZORAL) 2 % cream Apply topically 2 (two) times daily. For dry flaky patches of ear.  Qty: 30 g, Refills: 1    Associated Diagnoses: Seborrheic dermatitis      latanoprost 0.005 % ophthalmic solution Place 1 drop into both eyes every evening.  Qty: 2.5 mL, Refills: 12    Associated Diagnoses: Primary open angle glaucoma (POAG) of both eyes, moderate stage      nitroGLYCERIN (NITROSTAT) 0.4 MG SL tablet DISSOLVE 1 TABLET UNDER TONGUE EVERY 5 MINUTES FOR CHEST PAIN (MAY TAKE UP TO 3 DOSES THEN SEEK MEDICAL  ATTENTION)  Qty: 25 tablet, Refills: 0      potassium chloride SA (K-DUR,KLOR-CON) 20 MEQ tablet Take 1 tablet (20 mEq total) by mouth once daily. Take extra dose with Lasix - fluid pill  Qty: 90 tablet, Refills: 1      tiZANidine (ZANAFLEX) 4 MG tablet TAKE 1 TABLET BY MOUTH TWICE DAILY AS NEEDED  Qty: 60 tablet, Refills: 5    Comments: This prescription was filled on 7/19/2023. Any refills authorized will be placed on file.      triamcinolone acetonide 0.025% (KENALOG) 0.025 % cream Apply topically 2 (two) times daily. PRN rash and itching of ear. Mild steroid cream.  Qty: 30 g, Refills: 0    Associated Diagnoses: Seborrheic dermatitis                 Discharge Diagnosis: Sacroiliac joint pain [M53.3]  Condition on Discharge: Stable with no complications to procedure   Diet on Discharge: Same as before.  Activity: as per instruction sheet.  Discharge to: Home with a responsible adult.  Follow up: 2-4 weeks       Please call the office at [phone] if you experience any weakness or loss of sensation, fever > 101.5, pain uncontrolled with oral medications, persistent nausea/vomiting/or diarrhea, redness or drainage from the incisions, or any other worrisome concerns. If physician on call was not reached or could not communicate with our office for any reason please go to the nearest emergency department

## 2023-07-31 ENCOUNTER — PATIENT MESSAGE (OUTPATIENT)
Dept: OTOLARYNGOLOGY | Facility: CLINIC | Age: 78
End: 2023-07-31

## 2023-07-31 ENCOUNTER — OFFICE VISIT (OUTPATIENT)
Dept: OTOLARYNGOLOGY | Facility: CLINIC | Age: 78
End: 2023-07-31
Payer: MEDICARE

## 2023-07-31 ENCOUNTER — TELEPHONE (OUTPATIENT)
Dept: INTERNAL MEDICINE | Facility: CLINIC | Age: 78
End: 2023-07-31
Payer: MEDICARE

## 2023-07-31 DIAGNOSIS — Z12.31 ENCOUNTER FOR SCREENING MAMMOGRAM FOR BREAST CANCER: Primary | ICD-10-CM

## 2023-07-31 DIAGNOSIS — H90.6 MIXED CONDUCTIVE AND SENSORINEURAL HEARING LOSS OF BOTH EARS: ICD-10-CM

## 2023-07-31 DIAGNOSIS — Z98.890 HISTORY OF TYMPANOPLASTY OF RIGHT EAR: ICD-10-CM

## 2023-07-31 DIAGNOSIS — H61.21 IMPACTED CERUMEN OF RIGHT EAR: ICD-10-CM

## 2023-07-31 DIAGNOSIS — J34.89 NASAL SEPTAL PERFORATION: Primary | ICD-10-CM

## 2023-07-31 DIAGNOSIS — R42 VERTIGO: ICD-10-CM

## 2023-07-31 PROCEDURE — 69210 PR REMOVAL IMPACTED CERUMEN REQUIRING INSTRUMENTATION, UNILATERAL: ICD-10-PCS | Mod: S$GLB,,, | Performed by: STUDENT IN AN ORGANIZED HEALTH CARE EDUCATION/TRAINING PROGRAM

## 2023-07-31 PROCEDURE — 3288F PR FALLS RISK ASSESSMENT DOCUMENTED: ICD-10-PCS | Mod: CPTII,S$GLB,, | Performed by: STUDENT IN AN ORGANIZED HEALTH CARE EDUCATION/TRAINING PROGRAM

## 2023-07-31 PROCEDURE — 69210 REMOVE IMPACTED EAR WAX UNI: CPT | Mod: S$GLB,,, | Performed by: STUDENT IN AN ORGANIZED HEALTH CARE EDUCATION/TRAINING PROGRAM

## 2023-07-31 PROCEDURE — 1159F MED LIST DOCD IN RCRD: CPT | Mod: CPTII,S$GLB,, | Performed by: STUDENT IN AN ORGANIZED HEALTH CARE EDUCATION/TRAINING PROGRAM

## 2023-07-31 PROCEDURE — 3288F FALL RISK ASSESSMENT DOCD: CPT | Mod: CPTII,S$GLB,, | Performed by: STUDENT IN AN ORGANIZED HEALTH CARE EDUCATION/TRAINING PROGRAM

## 2023-07-31 PROCEDURE — 99999 PR PBB SHADOW E&M-EST. PATIENT-LVL III: ICD-10-PCS | Mod: PBBFAC,,, | Performed by: STUDENT IN AN ORGANIZED HEALTH CARE EDUCATION/TRAINING PROGRAM

## 2023-07-31 PROCEDURE — 1100F PR PT FALLS ASSESS DOC 2+ FALLS/FALL W/INJURY/YR: ICD-10-PCS | Mod: CPTII,S$GLB,, | Performed by: STUDENT IN AN ORGANIZED HEALTH CARE EDUCATION/TRAINING PROGRAM

## 2023-07-31 PROCEDURE — 1159F PR MEDICATION LIST DOCUMENTED IN MEDICAL RECORD: ICD-10-PCS | Mod: CPTII,S$GLB,, | Performed by: STUDENT IN AN ORGANIZED HEALTH CARE EDUCATION/TRAINING PROGRAM

## 2023-07-31 PROCEDURE — 99999 PR PBB SHADOW E&M-EST. PATIENT-LVL III: CPT | Mod: PBBFAC,,, | Performed by: STUDENT IN AN ORGANIZED HEALTH CARE EDUCATION/TRAINING PROGRAM

## 2023-07-31 PROCEDURE — 99214 PR OFFICE/OUTPT VISIT, EST, LEVL IV, 30-39 MIN: ICD-10-PCS | Mod: 25,S$GLB,, | Performed by: STUDENT IN AN ORGANIZED HEALTH CARE EDUCATION/TRAINING PROGRAM

## 2023-07-31 PROCEDURE — 1100F PTFALLS ASSESS-DOCD GE2>/YR: CPT | Mod: CPTII,S$GLB,, | Performed by: STUDENT IN AN ORGANIZED HEALTH CARE EDUCATION/TRAINING PROGRAM

## 2023-07-31 PROCEDURE — 99214 OFFICE O/P EST MOD 30 MIN: CPT | Mod: 25,S$GLB,, | Performed by: STUDENT IN AN ORGANIZED HEALTH CARE EDUCATION/TRAINING PROGRAM

## 2023-07-31 NOTE — TELEPHONE ENCOUNTER
----- Message from Julianna Covington sent at 7/28/2023  5:12 PM CDT -----  Type:  Mammogram    Caller is requesting to schedule their annual mammogram appointment.  Order is not listed in EPIC.  Please enter order and contact patient to schedule.  Name of Caller: pt   Where would they like the mammogram performed?[facility]   Would the patient rather a call back or a response via [facility]?  call  Best Call Back [phone]  Additional Information:  mammo

## 2023-07-31 NOTE — TELEPHONE ENCOUNTER
Returned call to pt. Order placed, mammo scheduled. Pt verbalized understanding of appt date and time.

## 2023-07-31 NOTE — PROGRESS NOTES
Chief complaint:   Chief Complaint   Patient presents with    Sinus Problem     F/U          Referring Provider:  Jarrell Dye Md  [address] [place] [address]    History of Present Illness:     Ms. Felix is a 77 y.o. female presenting for evaluation of right ear drainage. Comes and goes. Not draining recently. Onset of this chief complaint was about 10 years ago.  Additional symptoms that also have been associated are some hearing loss. The patient has tried drops intermittently with relief.  The patient denies otalgia.      Has had R tympanoplasty about 10 years ago. States it did not take. Drainage will come and go. Last time it was drainage was a few months ago.       The patient denies significant hearing loss risk factors, ototoxic medication exposure, chronic vestibular suppressant use, head/ facial/ micheal trauma, and otologic surgery.      Does also complain of nasal crusting and bleeding, sensation foreign body in the right nostril. Will have some small clots.  Known history of nasal septal perforation. Has known about it for 4+ years. Denies trauma.  Denies rheumatologic or AI diseases.  States saline irrigations will make it bleed.   Uses vaseline.         History        Past Medical History:   Past Medical History:   Diagnosis Date    Abnormal ECG 8/5/2019    Aneurysm     Anticoagulant long-term use     Arthritis     CAD in native artery 8/5/2019    COPD (chronic obstructive pulmonary disease)     Diabetes mellitus     Fall 10/10/2019    Formatting of this note might be different from the original. Found sitting on floor next to bed last night Mild confusion today Does not recall falling or how she ended up on floor UA today Labs yesterday unremarkable    Glaucoma     Hypertension     Seizures     Shingles 05/27/2017    Stroke     .          Past Surgical History:  Past Surgical History:   Procedure Laterality Date    BRAIN SURGERY      CARDIAC CATHETERIZATION      CORONARY ANGIOPLASTY       EPIDURAL STEROID INJECTION INTO LUMBAR SPINE Bilateral 11/12/2019    Procedure: TF XANDER L4/5;  Surgeon: Desean Dias MD;  Location: [facility];  Service: Pain Management;  Laterality: Bilateral;    HYSTERECTOMY      INJECTION OF ANESTHETIC AGENT AROUND MEDIAL BRANCH NERVES INNERVATING LUMBAR FACET JOINT Bilateral 1/31/2020    Procedure: Bilateral L3-5 MBB;  Surgeon: Desean Dias MD;  Location: [facility];  Service: Pain Management;  Laterality: Bilateral;    INJECTION OF ANESTHETIC AGENT INTO SACROILIAC JOINT Right 11/16/2021    Procedure: Right BLOCK, SACROILIAC JOINT Right GTB with RN IV sedation;  Surgeon: Yao Fulton MD;  [facility];  Service: Pain Management;  Laterality: Right;    INJECTION OF ANESTHETIC AGENT INTO SACROILIAC JOINT Bilateral 7/28/2023    Procedure: Bilateral GT bursa + bilateral SIJ injection;  Surgeon: Francisco Oshea MD;  Location: [facility];  Service: Pain Management;  Laterality: Bilateral;    INJECTION OF JOINT Bilateral 7/9/2020    Procedure: Bilateral shoulder GH Joint injection with local;  Surgeon: Desean Dias MD;  Location: [facility];  Service: Pain Management;  Laterality: Bilateral;    INJECTION OF JOINT Bilateral 7/28/2023    Procedure: Bilateral GT bursa + bilateral SIJ injection NEEDS ADDITIONAL SEDATION AND MORE TIME BEFORE INJECTION RN IV Sedation;  Surgeon: Francisco Oshea MD;  Location: [facility];  Service: Pain Management;  Laterality: Bilateral;    SELECTIVE INJECTION OF ANESTHETIC AGENT AROUND LUMBAR SPINAL NERVE ROOT BY TRANSFORAMINAL APPROACH Bilateral 4/26/2023    Procedure: Bilateral L4/5 TF XANDER RN IV Sedation;  Surgeon: Francisco Oshea MD;  Location: [facility];  Service: Pain Management;  Laterality: Bilateral;    TRANSFORAMINAL EPIDURAL INJECTION OF STEROID Bilateral 7/23/2019    Procedure: Bilateral L3/4 Transforaminal Epidural Steroid Injection;  Surgeon: Desean Dias MD;  Location: [facility];  Service: Pain  Management;  Laterality: Bilateral;    TRANSFORAMINAL EPIDURAL INJECTION OF STEROID Bilateral 3/10/2020    Procedure: Right T12/L1 TF XANDER with local;  Surgeon: Desean Dias MD;  Location: [facility];  Service: Pain Management;  Laterality: Bilateral;    TRANSFORAMINAL EPIDURAL INJECTION OF STEROID Right 6/19/2020    Procedure: Right T12/L1 TFESI Covid day of procedure;  Surgeon: Desean Dias MD;  Location: [facility];  Service: Pain Management;  Laterality: Right;   .         Medications: Medication list was reviewed. She  has a current medication list which includes the following prescription(s): albuterol, albuterol, amlodipine, aptiom, atorvastatin, carvedilol, cholecalciferol (vitamin d3), clopidogrel, daliresp, denosumab, diltiazem, dorzolamide-timolol 2-0.5%, escitalopram oxalate, fluticasone propionate, trelegy ellipta, ipratropium, isosorbide mononitrate, ketoconazole, latanoprost, levetiracetam, magnesium oxide, metformin, montelukast, myrbetriq, nabumetone, nitroglycerin, omeprazole, potassium chloride sa, ranolazine, tizanidine, triamcinolone acetonide 0.025%, valsartan, and nortriptyline.         Allergies:   Review of patient's allergies indicates:   Allergen Reactions    Penicillins Anaphylaxis, Hives, Shortness Of Breath and Swelling    Adhesive Rash    Doxycycline Nausea Only    Oxycodone Other (See Comments)     Fatigue      Sulfa (sulfonamide antibiotics) Itching            Family history: family history includes No Known Problems in her father and mother.         Social History          Alcohol use:  reports no history of alcohol use.            Tobacco:  reports that she quit smoking about 19 years ago. Her smoking use included cigarettes. She started smoking about 61 years ago. She has a 42.3 pack-year smoking history. She has never used smokeless tobacco.         Please see the patient's intake form for full details of past medical history, past surgical history, family history,  social history and review of systems. ?This information was reviewed by me and noted.      Physical Examination     General: Well developed, well nourished, well hydrated. Verbal with a strong voice and not dysphonic.     Head/Face: Normocephalic, atraumatic. No scars or lesions. Facial musculature equal.     Eyes: No scleral icterus or conjunctival hemorrhage. EOMI. PERRLA.     Ears: after disimpaction    Right ear: No gross deformity. EAC is clear of debris and erythema. The TM is intact with a pneumatized middle ear. No signs of retraction, fluid or infection.      Left ear: No gross deformity. EAC is clear of debris and erythema. The TM is intact with graft material superiorly, with a pneumatized middle ear. No signs of retraction, fluid or infection.     Hearing: grossly intact    Nose: No gross deformity or lesions. No purulent discharge. ~1.5 cm septal perforation, well away from the caudal septal angle, the nasal tip is well supported. L>R NV collapse     Mouth/Oropharynx: Lips without any lesions. No mucosal lesions within the oropharynx. No tonsillar exudate or lesions. Pharyngeal walls symmetrical. Uvula midline. Tongue midline without lesions.     Neck: Trachea midline. No masses. No thyromegaly or nodules palpated.     Lymphatic: No lymphadenopathy in the neck.     Extremities: No cyanosis. Warm and well-perfused.     Skin: No scars or lesions on face or neck.      Neurologic: Moving all extremities without gross abnormality.CN II-XII grossly intact. House-Brackmann 1/6. No signs of nystagmus.      Psych: Alert and oriented to person, place, and time with an appropriate mood and affect.     Data review:    Review of records:      I reviewed records from the referring provider's office visits.  These describe the history, workup, and/or treatment of this problem thus far.    Audiogram     Audiogram was independently reviewed         Imaging    I have independently reviewed the following imaging with the  findings noted below:      CT sinus 2022   Septal perforation with deviation to the left  No significant sinus disease    Procedure: ear microscopy with removal of cerumen    Description: The patient was in agreement with the examination and debridement of the ears. Removal of the cerumen required use of an operating microscope and multiple micro-instruments.     With the patient in the supine position, we used the operating microscope to examine both ears with the appropriate sized ear speculum.  A variety of sterile, micro-instruments were utilized to remove the cerumen atraumatically.  I performed the procedure which required a significant amount of time and effort. The tympanic membrane was then well visualized.  The patient tolerated the procedure well and there were no complications.    Findings:   Right ear had moderate wax, the EAC was normal, and the  TM is intact with graft material superiorly, with a pneumatized middle ear. No signs of retraction, fluid or infection.       Assessment/Plan:      1. Nasal septal perforation    2. Vertigo    3. History of tympanoplasty of right ear    4. Mixed conductive and sensorineural hearing loss of both ears    5. Impacted cerumen of right ear         We discussed continued conservative measures for her septal perforation. Will add ayr gel. Would hold off on surgical intervention unless symptoms worsen, perforation grows.    Ears are stable at this time. She is medically cleared for hearing aids.     Jani Light MD  [facility]   [facility] [address]  [place] [address]  P: [phone]  F: [phone]

## 2023-08-07 RX ORDER — GABAPENTIN 300 MG/1
300 CAPSULE ORAL 3 TIMES DAILY
COMMUNITY
End: 2023-08-07 | Stop reason: SDUPTHER

## 2023-08-07 RX ORDER — GABAPENTIN 300 MG/1
300 CAPSULE ORAL 3 TIMES DAILY
Qty: 90 CAPSULE | Refills: 1 | Status: SHIPPED | OUTPATIENT
Start: 2023-08-07

## 2023-08-07 NOTE — TELEPHONE ENCOUNTER
----- Message from Tom Go MA sent at 8/7/2023  2:07 PM CDT -----  Contact: [phone]  Patient called                In regards to speak with staff or provider about prescribing medication for nerve pain.              Call back   [phone]

## 2023-08-07 NOTE — TELEPHONE ENCOUNTER
Good Afternoon,     Pt is requesting for a refill of: gabapentin (NEURONTIN) 300 MG capsule   Last filed:5/22/23  Last encounter: 8/29/23  Up coming apt: 6/27/23  Last proc: 7/28/28  Pharmacy:[facility] [place] [id number] [facility]  Is this something you can do?

## 2023-08-13 ENCOUNTER — HOSPITAL ENCOUNTER (OUTPATIENT)
Facility: HOSPITAL | Age: 78
Discharge: HOME | End: 2023-08-16
Attending: EMERGENCY MEDICINE | Admitting: STUDENT IN AN ORGANIZED HEALTH CARE EDUCATION/TRAINING PROGRAM
Payer: MEDICARE

## 2023-08-13 DIAGNOSIS — N30.00 ACUTE CYSTITIS WITHOUT HEMATURIA: ICD-10-CM

## 2023-08-13 DIAGNOSIS — E87.6 HYPOKALEMIA: ICD-10-CM

## 2023-08-13 DIAGNOSIS — R29.6 RECURRENT FALLS: ICD-10-CM

## 2023-08-13 DIAGNOSIS — E83.42 HYPOMAGNESEMIA: ICD-10-CM

## 2023-08-13 DIAGNOSIS — N17.9 AKI (ACUTE KIDNEY INJURY): Primary | ICD-10-CM

## 2023-08-13 DIAGNOSIS — R07.9 CHEST PAIN: ICD-10-CM

## 2023-08-13 DIAGNOSIS — R19.7 ACUTE DIARRHEA: ICD-10-CM

## 2023-08-13 DIAGNOSIS — D64.9 ACUTE ANEMIA: ICD-10-CM

## 2023-08-13 DIAGNOSIS — R53.1 WEAKNESS: ICD-10-CM

## 2023-08-13 DIAGNOSIS — E86.0 DEHYDRATION: ICD-10-CM

## 2023-08-13 PROBLEM — R56.9 SEIZURE: Status: ACTIVE | Noted: 2019-12-23

## 2023-08-13 PROBLEM — R11.2 INTRACTABLE VOMITING WITH NAUSEA: Status: ACTIVE | Noted: 2023-08-13

## 2023-08-13 LAB
ALBUMIN SERPL BCP-MCNC: 2.9 G/DL (ref 3.5–5.2)
ALP SERPL-CCNC: 56 U/L (ref 55–135)
ALT SERPL W/O P-5'-P-CCNC: 19 U/L (ref 10–44)
ANION GAP SERPL CALC-SCNC: 14 MMOL/L (ref 8–16)
ANION GAP SERPL CALC-SCNC: 15 MMOL/L (ref 8–16)
ANISOCYTOSIS BLD QL SMEAR: SLIGHT
AST SERPL-CCNC: 32 U/L (ref 10–40)
BACTERIA #/AREA URNS HPF: ABNORMAL /HPF
BASOPHILS # BLD AUTO: 0.02 K/UL (ref 0–0.2)
BASOPHILS NFR BLD: 0.2 % (ref 0–1.9)
BILIRUB SERPL-MCNC: 0.5 MG/DL (ref 0.1–1)
BILIRUB UR QL STRIP: ABNORMAL
BUN SERPL-MCNC: 22 MG/DL (ref 8–23)
BUN SERPL-MCNC: 32 MG/DL (ref 8–23)
CALCIUM SERPL-MCNC: 7.6 MG/DL (ref 8.7–10.5)
CALCIUM SERPL-MCNC: 8.1 MG/DL (ref 8.7–10.5)
CHLORIDE SERPL-SCNC: 105 MMOL/L (ref 95–110)
CHLORIDE SERPL-SCNC: 107 MMOL/L (ref 95–110)
CLARITY UR: ABNORMAL
CO2 SERPL-SCNC: 13 MMOL/L (ref 23–29)
CO2 SERPL-SCNC: 14 MMOL/L (ref 23–29)
COLOR UR: YELLOW
CREAT SERPL-MCNC: 1 MG/DL (ref 0.5–1.4)
CREAT SERPL-MCNC: 1.5 MG/DL (ref 0.5–1.4)
DIFFERENTIAL METHOD: ABNORMAL
EOSINOPHIL # BLD AUTO: 0 K/UL (ref 0–0.5)
EOSINOPHIL NFR BLD: 0 % (ref 0–8)
ERYTHROCYTE [DISTWIDTH] IN BLOOD BY AUTOMATED COUNT: 13.7 % (ref 11.5–14.5)
EST. GFR  (NO RACE VARIABLE): 35 ML/MIN/1.73 M^2
EST. GFR  (NO RACE VARIABLE): 58 ML/MIN/1.73 M^2
GLUCOSE SERPL-MCNC: 80 MG/DL (ref 70–110)
GLUCOSE SERPL-MCNC: 91 MG/DL (ref 70–110)
GLUCOSE UR QL STRIP: NEGATIVE
HCT VFR BLD AUTO: 37.2 % (ref 37–48.5)
HGB BLD-MCNC: 12.5 G/DL (ref 12–16)
HGB UR QL STRIP: ABNORMAL
HYALINE CASTS #/AREA URNS LPF: 1 /LPF
IMM GRANULOCYTES # BLD AUTO: 0.08 K/UL (ref 0–0.04)
IMM GRANULOCYTES NFR BLD AUTO: 1 % (ref 0–0.5)
KETONES UR QL STRIP: ABNORMAL
LACTATE SERPL-SCNC: 0.7 MMOL/L (ref 0.5–2.2)
LEUKOCYTE ESTERASE UR QL STRIP: ABNORMAL
LIPASE SERPL-CCNC: 27 U/L (ref 4–60)
LYMPHOCYTES # BLD AUTO: 0.5 K/UL (ref 1–4.8)
LYMPHOCYTES NFR BLD: 6 % (ref 18–48)
MAGNESIUM SERPL-MCNC: 1.2 MG/DL (ref 1.6–2.6)
MCH RBC QN AUTO: 30.5 PG (ref 27–31)
MCHC RBC AUTO-ENTMCNC: 33.6 G/DL (ref 32–36)
MCV RBC AUTO: 91 FL (ref 82–98)
MICROSCOPIC COMMENT: ABNORMAL
MONOCYTES # BLD AUTO: 0.6 K/UL (ref 0.3–1)
MONOCYTES NFR BLD: 7.3 % (ref 4–15)
NEUTROPHILS # BLD AUTO: 6.9 K/UL (ref 1.8–7.7)
NEUTROPHILS NFR BLD: 85.5 % (ref 38–73)
NITRITE UR QL STRIP: POSITIVE
NRBC BLD-RTO: 0 /100 WBC
OVALOCYTES BLD QL SMEAR: ABNORMAL
PH UR STRIP: 6 [PH] (ref 5–8)
PLATELET # BLD AUTO: 147 K/UL (ref 150–450)
PLATELET BLD QL SMEAR: ABNORMAL
PMV BLD AUTO: 9.4 FL (ref 9.2–12.9)
POCT GLUCOSE: 82 MG/DL (ref 70–110)
POCT GLUCOSE: 85 MG/DL (ref 70–110)
POIKILOCYTOSIS BLD QL SMEAR: SLIGHT
POTASSIUM SERPL-SCNC: 2.8 MMOL/L (ref 3.5–5.1)
POTASSIUM SERPL-SCNC: 3.4 MMOL/L (ref 3.5–5.1)
PROCALCITONIN SERPL IA-MCNC: 0.3 NG/ML
PROT SERPL-MCNC: 7.2 G/DL (ref 6–8.4)
PROT UR QL STRIP: ABNORMAL
RBC # BLD AUTO: 4.1 M/UL (ref 4–5.4)
RBC #/AREA URNS HPF: 30 /HPF (ref 0–4)
SODIUM SERPL-SCNC: 133 MMOL/L (ref 136–145)
SODIUM SERPL-SCNC: 135 MMOL/L (ref 136–145)
SP GR UR STRIP: >=1.03 (ref 1–1.03)
SQUAMOUS #/AREA URNS HPF: 2 /HPF
TROPONIN I SERPL DL<=0.01 NG/ML-MCNC: 0.02 NG/ML (ref 0–0.03)
TROPONIN I SERPL DL<=0.01 NG/ML-MCNC: 0.03 NG/ML (ref 0–0.03)
URN SPEC COLLECT METH UR: ABNORMAL
UROBILINOGEN UR STRIP-ACNC: 1 EU/DL
WBC # BLD AUTO: 8.1 K/UL (ref 3.9–12.7)
WBC #/AREA URNS HPF: >100 /HPF (ref 0–5)

## 2023-08-13 PROCEDURE — 80048 BASIC METABOLIC PNL TOTAL CA: CPT | Mod: XB | Performed by: NURSE PRACTITIONER

## 2023-08-13 PROCEDURE — 36415 COLL VENOUS BLD VENIPUNCTURE: CPT | Performed by: NURSE PRACTITIONER

## 2023-08-13 PROCEDURE — 36415 COLL VENOUS BLD VENIPUNCTURE: CPT | Performed by: STUDENT IN AN ORGANIZED HEALTH CARE EDUCATION/TRAINING PROGRAM

## 2023-08-13 PROCEDURE — 96375 TX/PRO/DX INJ NEW DRUG ADDON: CPT

## 2023-08-13 PROCEDURE — 25000003 PHARM REV CODE 250: Performed by: STUDENT IN AN ORGANIZED HEALTH CARE EDUCATION/TRAINING PROGRAM

## 2023-08-13 PROCEDURE — 96367 TX/PROPH/DG ADDL SEQ IV INF: CPT

## 2023-08-13 PROCEDURE — 96376 TX/PRO/DX INJ SAME DRUG ADON: CPT

## 2023-08-13 PROCEDURE — 25000242 PHARM REV CODE 250 ALT 637 W/ HCPCS: Performed by: STUDENT IN AN ORGANIZED HEALTH CARE EDUCATION/TRAINING PROGRAM

## 2023-08-13 PROCEDURE — 99285 EMERGENCY DEPT VISIT HI MDM: CPT | Mod: 25

## 2023-08-13 PROCEDURE — 96361 HYDRATE IV INFUSION ADD-ON: CPT

## 2023-08-13 PROCEDURE — 84145 PROCALCITONIN (PCT): CPT | Performed by: EMERGENCY MEDICINE

## 2023-08-13 PROCEDURE — 83735 ASSAY OF MAGNESIUM: CPT | Performed by: EMERGENCY MEDICINE

## 2023-08-13 PROCEDURE — 94640 AIRWAY INHALATION TREATMENT: CPT

## 2023-08-13 PROCEDURE — 83605 ASSAY OF LACTIC ACID: CPT | Performed by: EMERGENCY MEDICINE

## 2023-08-13 PROCEDURE — 82272 OCCULT BLD FECES 1-3 TESTS: CPT | Performed by: STUDENT IN AN ORGANIZED HEALTH CARE EDUCATION/TRAINING PROGRAM

## 2023-08-13 PROCEDURE — G0378 HOSPITAL OBSERVATION PER HR: HCPCS

## 2023-08-13 PROCEDURE — 93010 EKG 12-LEAD: ICD-10-PCS | Mod: ,,, | Performed by: INTERNAL MEDICINE

## 2023-08-13 PROCEDURE — 25000003 PHARM REV CODE 250: Performed by: NURSE PRACTITIONER

## 2023-08-13 PROCEDURE — 80053 COMPREHEN METABOLIC PANEL: CPT | Performed by: EMERGENCY MEDICINE

## 2023-08-13 PROCEDURE — 87086 URINE CULTURE/COLONY COUNT: CPT | Performed by: EMERGENCY MEDICINE

## 2023-08-13 PROCEDURE — 63600175 PHARM REV CODE 636 W HCPCS: Performed by: STUDENT IN AN ORGANIZED HEALTH CARE EDUCATION/TRAINING PROGRAM

## 2023-08-13 PROCEDURE — 87040 BLOOD CULTURE FOR BACTERIA: CPT | Mod: 59 | Performed by: EMERGENCY MEDICINE

## 2023-08-13 PROCEDURE — 85025 COMPLETE CBC W/AUTO DIFF WBC: CPT | Performed by: EMERGENCY MEDICINE

## 2023-08-13 PROCEDURE — 96372 THER/PROPH/DIAG INJ SC/IM: CPT | Mod: 59 | Performed by: STUDENT IN AN ORGANIZED HEALTH CARE EDUCATION/TRAINING PROGRAM

## 2023-08-13 PROCEDURE — 93010 ELECTROCARDIOGRAM REPORT: CPT | Mod: ,,, | Performed by: INTERNAL MEDICINE

## 2023-08-13 PROCEDURE — 83690 ASSAY OF LIPASE: CPT | Performed by: EMERGENCY MEDICINE

## 2023-08-13 PROCEDURE — 81000 URINALYSIS NONAUTO W/SCOPE: CPT | Performed by: EMERGENCY MEDICINE

## 2023-08-13 PROCEDURE — 25000003 PHARM REV CODE 250: Performed by: EMERGENCY MEDICINE

## 2023-08-13 PROCEDURE — 84484 ASSAY OF TROPONIN QUANT: CPT | Performed by: STUDENT IN AN ORGANIZED HEALTH CARE EDUCATION/TRAINING PROGRAM

## 2023-08-13 PROCEDURE — 96365 THER/PROPH/DIAG IV INF INIT: CPT

## 2023-08-13 PROCEDURE — 63600175 PHARM REV CODE 636 W HCPCS: Performed by: EMERGENCY MEDICINE

## 2023-08-13 PROCEDURE — 87449 NOS EACH ORGANISM AG IA: CPT | Performed by: STUDENT IN AN ORGANIZED HEALTH CARE EDUCATION/TRAINING PROGRAM

## 2023-08-13 PROCEDURE — C9113 INJ PANTOPRAZOLE SODIUM, VIA: HCPCS | Performed by: STUDENT IN AN ORGANIZED HEALTH CARE EDUCATION/TRAINING PROGRAM

## 2023-08-13 PROCEDURE — 93005 ELECTROCARDIOGRAM TRACING: CPT

## 2023-08-13 PROCEDURE — 96366 THER/PROPH/DIAG IV INF ADDON: CPT

## 2023-08-13 RX ORDER — GABAPENTIN 100 MG/1
200 CAPSULE ORAL 3 TIMES DAILY
Status: DISCONTINUED | OUTPATIENT
Start: 2023-08-13 | End: 2023-08-16 | Stop reason: HOSPADM

## 2023-08-13 RX ORDER — ARFORMOTEROL TARTRATE 15 UG/2ML
15 SOLUTION RESPIRATORY (INHALATION) 2 TIMES DAILY
Status: DISCONTINUED | OUTPATIENT
Start: 2023-08-13 | End: 2023-08-16 | Stop reason: HOSPADM

## 2023-08-13 RX ORDER — ACETAMINOPHEN 325 MG/1
650 TABLET ORAL EVERY 8 HOURS PRN
Status: DISCONTINUED | OUTPATIENT
Start: 2023-08-13 | End: 2023-08-16 | Stop reason: HOSPADM

## 2023-08-13 RX ORDER — HYDRALAZINE HYDROCHLORIDE 20 MG/ML
10 INJECTION INTRAMUSCULAR; INTRAVENOUS EVERY 8 HOURS PRN
Status: DISCONTINUED | OUTPATIENT
Start: 2023-08-13 | End: 2023-08-16 | Stop reason: HOSPADM

## 2023-08-13 RX ORDER — CIPROFLOXACIN 2 MG/ML
400 INJECTION, SOLUTION INTRAVENOUS
Status: DISCONTINUED | OUTPATIENT
Start: 2023-08-13 | End: 2023-08-13

## 2023-08-13 RX ORDER — NALOXONE HCL 0.4 MG/ML
0.02 VIAL (ML) INJECTION
Status: DISCONTINUED | OUTPATIENT
Start: 2023-08-13 | End: 2023-08-16 | Stop reason: HOSPADM

## 2023-08-13 RX ORDER — IBUPROFEN 200 MG
16 TABLET ORAL
Status: DISCONTINUED | OUTPATIENT
Start: 2023-08-13 | End: 2023-08-16 | Stop reason: HOSPADM

## 2023-08-13 RX ORDER — HYDROCODONE BITARTRATE AND ACETAMINOPHEN 7.5; 325 MG/1; MG/1
1 TABLET ORAL EVERY 6 HOURS PRN
Status: DISCONTINUED | OUTPATIENT
Start: 2023-08-13 | End: 2023-08-16 | Stop reason: HOSPADM

## 2023-08-13 RX ORDER — IBUPROFEN 200 MG
24 TABLET ORAL
Status: DISCONTINUED | OUTPATIENT
Start: 2023-08-13 | End: 2023-08-16 | Stop reason: HOSPADM

## 2023-08-13 RX ORDER — SODIUM CHLORIDE 0.9 % (FLUSH) 0.9 %
10 SYRINGE (ML) INJECTION EVERY 12 HOURS PRN
Status: DISCONTINUED | OUTPATIENT
Start: 2023-08-13 | End: 2023-08-16 | Stop reason: HOSPADM

## 2023-08-13 RX ORDER — ALBUTEROL SULFATE 0.83 MG/ML
2.5 SOLUTION RESPIRATORY (INHALATION) EVERY 4 HOURS
Status: DISCONTINUED | OUTPATIENT
Start: 2023-08-13 | End: 2023-08-13

## 2023-08-13 RX ORDER — BUDESONIDE 0.5 MG/2ML
0.5 INHALANT ORAL EVERY 12 HOURS
Status: DISCONTINUED | OUTPATIENT
Start: 2023-08-13 | End: 2023-08-16 | Stop reason: HOSPADM

## 2023-08-13 RX ORDER — PANTOPRAZOLE SODIUM 40 MG/1
40 TABLET, DELAYED RELEASE ORAL 2 TIMES DAILY
Status: DISCONTINUED | OUTPATIENT
Start: 2023-08-14 | End: 2023-08-16 | Stop reason: HOSPADM

## 2023-08-13 RX ORDER — MAGNESIUM SULFATE HEPTAHYDRATE 40 MG/ML
2 INJECTION, SOLUTION INTRAVENOUS
Status: COMPLETED | OUTPATIENT
Start: 2023-08-13 | End: 2023-08-13

## 2023-08-13 RX ORDER — ATORVASTATIN CALCIUM 40 MG/1
40 TABLET, FILM COATED ORAL DAILY
Status: DISCONTINUED | OUTPATIENT
Start: 2023-08-14 | End: 2023-08-16 | Stop reason: HOSPADM

## 2023-08-13 RX ORDER — TALC
6 POWDER (GRAM) TOPICAL NIGHTLY PRN
Status: DISCONTINUED | OUTPATIENT
Start: 2023-08-13 | End: 2023-08-16 | Stop reason: HOSPADM

## 2023-08-13 RX ORDER — POTASSIUM CHLORIDE 20 MEQ/1
40 TABLET, EXTENDED RELEASE ORAL 2 TIMES DAILY
Status: COMPLETED | OUTPATIENT
Start: 2023-08-13 | End: 2023-08-14

## 2023-08-13 RX ORDER — PANTOPRAZOLE SODIUM 40 MG/10ML
80 INJECTION, POWDER, LYOPHILIZED, FOR SOLUTION INTRAVENOUS ONCE
Status: COMPLETED | OUTPATIENT
Start: 2023-08-13 | End: 2023-08-13

## 2023-08-13 RX ORDER — OXYBUTYNIN CHLORIDE 5 MG/1
10 TABLET, EXTENDED RELEASE ORAL DAILY
Status: DISCONTINUED | OUTPATIENT
Start: 2023-08-13 | End: 2023-08-14

## 2023-08-13 RX ORDER — ROFLUMILAST 500 UG/1
1 TABLET ORAL DAILY
Status: DISCONTINUED | OUTPATIENT
Start: 2023-08-14 | End: 2023-08-16 | Stop reason: HOSPADM

## 2023-08-13 RX ORDER — LEVETIRACETAM 500 MG/1
500 TABLET ORAL 2 TIMES DAILY
Status: DISCONTINUED | OUTPATIENT
Start: 2023-08-13 | End: 2023-08-16 | Stop reason: HOSPADM

## 2023-08-13 RX ORDER — CHOLECALCIFEROL (VITAMIN D3) 125 MCG
50000 CAPSULE ORAL
Status: DISCONTINUED | OUTPATIENT
Start: 2023-08-13 | End: 2023-08-13

## 2023-08-13 RX ORDER — IPRATROPIUM BROMIDE AND ALBUTEROL SULFATE 2.5; .5 MG/3ML; MG/3ML
3 SOLUTION RESPIRATORY (INHALATION) EVERY 6 HOURS
Status: DISCONTINUED | OUTPATIENT
Start: 2023-08-13 | End: 2023-08-16 | Stop reason: HOSPADM

## 2023-08-13 RX ORDER — GLUCAGON 1 MG
1 KIT INJECTION
Status: DISCONTINUED | OUTPATIENT
Start: 2023-08-13 | End: 2023-08-16 | Stop reason: HOSPADM

## 2023-08-13 RX ORDER — IPRATROPIUM BROMIDE 0.5 MG/2.5ML
0.5 SOLUTION RESPIRATORY (INHALATION) EVERY 6 HOURS
Status: DISCONTINUED | OUTPATIENT
Start: 2023-08-13 | End: 2023-08-13

## 2023-08-13 RX ORDER — INSULIN ASPART 100 [IU]/ML
0-5 INJECTION, SOLUTION INTRAVENOUS; SUBCUTANEOUS
Status: DISCONTINUED | OUTPATIENT
Start: 2023-08-13 | End: 2023-08-16 | Stop reason: HOSPADM

## 2023-08-13 RX ORDER — DILTIAZEM HYDROCHLORIDE 240 MG/1
240 CAPSULE, COATED, EXTENDED RELEASE ORAL DAILY
Status: DISCONTINUED | OUTPATIENT
Start: 2023-08-14 | End: 2023-08-16 | Stop reason: HOSPADM

## 2023-08-13 RX ORDER — CARVEDILOL 12.5 MG/1
25 TABLET ORAL 2 TIMES DAILY WITH MEALS
Status: DISCONTINUED | OUTPATIENT
Start: 2023-08-13 | End: 2023-08-15

## 2023-08-13 RX ORDER — SODIUM CHLORIDE, SODIUM LACTATE, POTASSIUM CHLORIDE, CALCIUM CHLORIDE 600; 310; 30; 20 MG/100ML; MG/100ML; MG/100ML; MG/100ML
INJECTION, SOLUTION INTRAVENOUS CONTINUOUS
Status: ACTIVE | OUTPATIENT
Start: 2023-08-13 | End: 2023-08-14

## 2023-08-13 RX ORDER — ONDANSETRON 2 MG/ML
4 INJECTION INTRAMUSCULAR; INTRAVENOUS
Status: COMPLETED | OUTPATIENT
Start: 2023-08-13 | End: 2023-08-13

## 2023-08-13 RX ORDER — POTASSIUM CHLORIDE 7.45 MG/ML
10 INJECTION INTRAVENOUS
Status: COMPLETED | OUTPATIENT
Start: 2023-08-13 | End: 2023-08-13

## 2023-08-13 RX ORDER — FLUTICASONE PROPIONATE 50 MCG
1 SPRAY, SUSPENSION (ML) NASAL DAILY
Status: DISCONTINUED | OUTPATIENT
Start: 2023-08-14 | End: 2023-08-16 | Stop reason: HOSPADM

## 2023-08-13 RX ORDER — ENOXAPARIN SODIUM 100 MG/ML
40 INJECTION SUBCUTANEOUS EVERY 24 HOURS
Status: DISCONTINUED | OUTPATIENT
Start: 2023-08-13 | End: 2023-08-16

## 2023-08-13 RX ORDER — ACETAMINOPHEN 500 MG
50000 TABLET ORAL
Status: DISCONTINUED | OUTPATIENT
Start: 2023-08-13 | End: 2023-08-16 | Stop reason: HOSPADM

## 2023-08-13 RX ORDER — CLOPIDOGREL BISULFATE 75 MG/1
75 TABLET ORAL DAILY
Status: DISCONTINUED | OUTPATIENT
Start: 2023-08-13 | End: 2023-08-16 | Stop reason: HOSPADM

## 2023-08-13 RX ORDER — LATANOPROST 50 UG/ML
1 SOLUTION/ DROPS OPHTHALMIC NIGHTLY
Status: DISCONTINUED | OUTPATIENT
Start: 2023-08-13 | End: 2023-08-16 | Stop reason: HOSPADM

## 2023-08-13 RX ORDER — MAGNESIUM SULFATE 1 G/100ML
1 INJECTION INTRAVENOUS ONCE
Status: COMPLETED | OUTPATIENT
Start: 2023-08-13 | End: 2023-08-13

## 2023-08-13 RX ORDER — AMLODIPINE BESYLATE 10 MG/1
10 TABLET ORAL DAILY
Status: DISCONTINUED | OUTPATIENT
Start: 2023-08-14 | End: 2023-08-14

## 2023-08-13 RX ORDER — ONDANSETRON 2 MG/ML
8 INJECTION INTRAMUSCULAR; INTRAVENOUS EVERY 6 HOURS PRN
Status: DISCONTINUED | OUTPATIENT
Start: 2023-08-13 | End: 2023-08-16 | Stop reason: HOSPADM

## 2023-08-13 RX ORDER — METRONIDAZOLE 500 MG/100ML
500 INJECTION, SOLUTION INTRAVENOUS
Status: DISCONTINUED | OUTPATIENT
Start: 2023-08-13 | End: 2023-08-14

## 2023-08-13 RX ORDER — ESCITALOPRAM OXALATE 10 MG/1
20 TABLET ORAL DAILY
Status: DISCONTINUED | OUTPATIENT
Start: 2023-08-14 | End: 2023-08-16 | Stop reason: HOSPADM

## 2023-08-13 RX ADMIN — SODIUM CHLORIDE, POTASSIUM CHLORIDE, SODIUM LACTATE AND CALCIUM CHLORIDE: 600; 310; 30; 20 INJECTION, SOLUTION INTRAVENOUS at 03:08

## 2023-08-13 RX ADMIN — ARFORMOTEROL TARTRATE 15 MCG: 15 SOLUTION RESPIRATORY (INHALATION) at 07:08

## 2023-08-13 RX ADMIN — LEVETIRACETAM 500 MG: 500 TABLET, FILM COATED ORAL at 09:08

## 2023-08-13 RX ADMIN — POTASSIUM CHLORIDE 10 MEQ: 7.46 INJECTION, SOLUTION INTRAVENOUS at 12:08

## 2023-08-13 RX ADMIN — MELATONIN TAB 3 MG 6 MG: 3 TAB at 09:08

## 2023-08-13 RX ADMIN — MAGNESIUM SULFATE 1 G: 1 INJECTION INTRAVENOUS at 02:08

## 2023-08-13 RX ADMIN — BUDESONIDE 0.5 MG: 0.5 INHALANT ORAL at 07:08

## 2023-08-13 RX ADMIN — MAGNESIUM SULFATE HEPTAHYDRATE 2 G: 40 INJECTION, SOLUTION INTRAVENOUS at 01:08

## 2023-08-13 RX ADMIN — GABAPENTIN 200 MG: 100 CAPSULE ORAL at 09:08

## 2023-08-13 RX ADMIN — SODIUM CHLORIDE 1000 ML: 9 INJECTION, SOLUTION INTRAVENOUS at 11:08

## 2023-08-13 RX ADMIN — ONDANSETRON 8 MG: 2 INJECTION INTRAMUSCULAR; INTRAVENOUS at 10:08

## 2023-08-13 RX ADMIN — METRONIDAZOLE 500 MG: 500 INJECTION, SOLUTION INTRAVENOUS at 02:08

## 2023-08-13 RX ADMIN — IPRATROPIUM BROMIDE AND ALBUTEROL SULFATE 3 ML: 2.5; .5 SOLUTION RESPIRATORY (INHALATION) at 07:08

## 2023-08-13 RX ADMIN — CEFTRIAXONE 1 G: 1 INJECTION, POWDER, FOR SOLUTION INTRAMUSCULAR; INTRAVENOUS at 11:08

## 2023-08-13 RX ADMIN — POTASSIUM CHLORIDE 40 MEQ: 1500 TABLET, EXTENDED RELEASE ORAL at 09:08

## 2023-08-13 RX ADMIN — POTASSIUM BICARBONATE 25 MEQ: 978 TABLET, EFFERVESCENT ORAL at 12:08

## 2023-08-13 RX ADMIN — ENOXAPARIN SODIUM 40 MG: 40 INJECTION SUBCUTANEOUS at 05:08

## 2023-08-13 RX ADMIN — METRONIDAZOLE 500 MG: 500 INJECTION, SOLUTION INTRAVENOUS at 10:08

## 2023-08-13 RX ADMIN — CARVEDILOL 25 MG: 12.5 TABLET, FILM COATED ORAL at 05:08

## 2023-08-13 RX ADMIN — GABAPENTIN 200 MG: 100 CAPSULE ORAL at 02:08

## 2023-08-13 RX ADMIN — PANTOPRAZOLE SODIUM 80 MG: 40 INJECTION, POWDER, FOR SOLUTION INTRAVENOUS at 02:08

## 2023-08-13 RX ADMIN — ONDANSETRON 4 MG: 2 INJECTION INTRAMUSCULAR; INTRAVENOUS at 11:08

## 2023-08-13 RX ADMIN — HYDROCODONE BITARTRATE AND ACETAMINOPHEN 1 TABLET: 7.5; 325 TABLET ORAL at 10:08

## 2023-08-13 NOTE — ASSESSMENT & PLAN NOTE
--2.8 on admission, likely 2/2 GI losses  --replaced IV & PO in the ER  --Magnesium low, s/p 3g IV replacement  --repeat in the AM, trend daily  --cardiac monitor, telemetry

## 2023-08-13 NOTE — ED PROVIDER NOTES
SCRIBE #1 NOTE: I, Xi Reynaga, am scribing for, and in the presence of, Lazara Dalal MD. I have scribed the entire note.       History     Chief Complaint   Patient presents with    Dysuria     Pt. C/O severe dysuria and lower back pain. Also c/o NVD x 3d.    Vomiting    Diarrhea     Review of patient's allergies indicates:   Allergen Reactions    Penicillins Anaphylaxis, Hives, Shortness Of Breath and Swelling    Adhesive Rash    Doxycycline Nausea Only    Oxycodone Other (See Comments)     Fatigue      Sulfa (sulfonamide antibiotics) Itching         History of Present Illness     HPI    8/13/2023, 10:32 AM  History obtained from the patient      History of Present Illness: Shireen Felix is a 77 y.o. female patient with a PMHx of DM, HTN, stroke, seizures, COPD, and CAD in native artery who presents to the Emergency Department for evaluation of dysuria with associated back pain, abdominal pain, fever, N/V and diarrhea which onset 3 days ago. Symptoms are constant and moderate in severity. No mitigating or exacerbating factors reported.  No further complaints or concerns at this time.       Arrival mode: Personal vehicle      PCP: Laron Chaudhari MD        Past Medical History:  Past Medical History:   Diagnosis Date    Abnormal ECG 8/5/2019    Aneurysm     Anticoagulant long-term use     Arthritis     CAD in native artery 8/5/2019    COPD (chronic obstructive pulmonary disease)     Diabetes mellitus     Fall 10/10/2019    Formatting of this note might be different from the original. Found sitting on floor next to bed last night Mild confusion today Does not recall falling or how she ended up on floor UA today Labs yesterday unremarkable    Glaucoma     Hypertension     Seizures     Shingles 05/27/2017    Stroke        Past Surgical History:  Past Surgical History:   Procedure Laterality Date    BRAIN SURGERY      CARDIAC CATHETERIZATION      CORONARY ANGIOPLASTY      EPIDURAL STEROID INJECTION  INTO LUMBAR SPINE Bilateral 11/12/2019    Procedure: TF XANDER L4/5;  Surgeon: Desean Dias MD;  [facility]: [facility];  Service: Pain Management;  Laterality: Bilateral;    HYSTERECTOMY      INJECTION OF ANESTHETIC AGENT AROUND MEDIAL BRANCH NERVES INNERVATING LUMBAR FACET JOINT Bilateral 1/31/2020    Procedure: Bilateral L3-5 MBB;  Surgeon: Desean Dias MD;  Location: [facility];  Service: Pain Management;  Laterality: Bilateral;    INJECTION OF ANESTHETIC AGENT INTO SACROILIAC JOINT Right 11/16/2021    Procedure: Right BLOCK, SACROILIAC JOINT Right GTB with RN IV sedation;  Surgeon: Yao Fulton MD;  Location: [facility];  Service: Pain Management;  Laterality: Right;    INJECTION OF ANESTHETIC AGENT INTO SACROILIAC JOINT Bilateral 7/28/2023    Procedure: Bilateral GT bursa + bilateral SIJ injection;  Surgeon: Francisco Oshea MD;  Location: [facility];  Service: Pain Management;  Laterality: Bilateral;    INJECTION OF JOINT Bilateral 7/9/2020    Procedure: Bilateral shoulder GH Joint injection with local;  Surgeon: Desean Dias MD;  Location: [facility];  Service: Pain Management;  Laterality: Bilateral;    INJECTION OF JOINT Bilateral 7/28/2023    Procedure: Bilateral GT bursa + bilateral SIJ injection NEEDS ADDITIONAL SEDATION AND MORE TIME BEFORE INJECTION RN IV Sedation;  Surgeon: Francisco Oshea MD;  Location: [facility];  Service: Pain Management;  Laterality: Bilateral;    SELECTIVE INJECTION OF ANESTHETIC AGENT AROUND LUMBAR SPINAL NERVE ROOT BY TRANSFORAMINAL APPROACH Bilateral 4/26/2023    Procedure: Bilateral L4/5 TF XANDER RN IV Sedation;  Surgeon: Francisco Oshea MD;  Location: [facility];  Service: Pain Management;  Laterality: Bilateral;    TRANSFORAMINAL EPIDURAL INJECTION OF STEROID Bilateral 7/23/2019    Procedure: Bilateral L3/4 Transforaminal Epidural Steroid Injection;  Surgeon: Desean Dias MD;  Location: [facility];  Service: Pain Management;  Laterality:  Bilateral;    TRANSFORAMINAL EPIDURAL INJECTION OF STEROID Bilateral 3/10/2020    Procedure: Right T12/L1 TF XANDER with local;  Surgeon: Desean Dias MD;  Location: [facility];  Service: Pain Management;  Laterality: Bilateral;    TRANSFORAMINAL EPIDURAL INJECTION OF STEROID Right 2020    Procedure: Right T12/L1 TFESI Covid day of procedure;  Surgeon: Desean Dias MD;  Location: [facility];  Service: Pain Management;  Laterality: Right;         Family History:  Family History   Problem Relation Age of Onset    No Known Problems Mother     No Known Problems Father        Social History:  Social History     Tobacco Use    Smoking status: Former     Current packs/day: 0.00     Average packs/day: 1 pack/day for 42.3 years (42.3 ttl pk-yrs)     Types: Cigarettes     Start date:      Quit date: 2004     Years since quittin.3    Smokeless tobacco: Never   Substance and Sexual Activity    Alcohol use: No    Drug use: Never    Sexual activity: Not Currently     Partners: Male        Review of Systems     Review of Systems   Constitutional:  Positive for fever.   Gastrointestinal:  Positive for abdominal pain, diarrhea, nausea and vomiting.   Genitourinary:  Positive for dysuria.   Musculoskeletal:  Positive for back pain.   All other systems reviewed and are negative.     Physical Exam     Initial Vitals [23 0850]   BP Pulse Resp Temp SpO2   (!) 113/55 95 16 98.2 °F (36.8 °C) 99 %      MAP       --          Physical Exam  Nursing Notes and Vital Signs Reviewed.  Constitutional: Patient is in no acute distress. Well-developed and well-nourished.  Head: Atraumatic. Normocephalic.  Eyes: PERRL. EOM intact. Conjunctivae are not pale. No scleral icterus.  ENT: Dry mucous membranes. Oropharynx is clear and symmetric.    Neck: Supple. Full ROM. No lymphadenopathy.  Cardiovascular: Regular rate. Regular rhythm. No murmurs, rubs, or gallops. Distal pulses are 2+ and symmetric.  Pulmonary/Chest: No  respiratory distress. Clear to auscultation bilaterally. No wheezing or rales.  Abdominal: Soft and non-distended.  There is no tenderness.  No rebound, guarding, or rigidity.  Genitourinary: No CVA tenderness  Musculoskeletal: Moves all extremities. No obvious deformities. No edema. No calf tenderness.  Skin: Warm and dry.  Neurological:  Alert, awake, and appropriate.  Normal speech.  No acute focal neurological deficits are appreciated.  Psychiatric: Normal affect. Good eye contact. Appropriate in content.     ED Course   Critical Care    Date/Time: 8/13/2023 5:05 PM    Performed by: Lazara Dalal MD  Authorized by: Everardo Graf MD  Direct patient critical care time: 25 minutes  Additional history critical care time: 5 minutes  Ordering / reviewing critical care time: 8 minutes  Documentation critical care time: 5 minutes  Consulting other physicians critical care time: 5 minutes  Total critical care time (exclusive of procedural time) : 48 minutes  Critical care was necessary to treat or prevent imminent or life-threatening deterioration of the following conditions: dehydration and sepsis (hypokalema and hypomag).  Critical care was time spent personally by me on the following activities: blood draw for specimens, development of treatment plan with patient or surrogate, discussions with consultants, interpretation of cardiac output measurements, evaluation of patient's response to treatment, examination of patient, obtaining history from patient or surrogate, ordering and performing treatments and interventions, ordering and review of laboratory studies, ordering and review of radiographic studies, pulse oximetry, re-evaluation of patient's condition and review of old charts.        ED Vital Signs:  Vitals:    08/13/23 0850 08/13/23 1009 08/13/23 1102 08/13/23 1232   BP: (!) 113/55  (!) 117/59 130/61   Pulse: 95  92 90   Resp: 16      Temp: 98.2 °F (36.8 °C)      TempSrc: Oral      SpO2: 99%  96% 98%  "  Weight:  53 kg (116 lb 13.5 oz)     Height: 5' 4" (1.626 m)       08/13/23 1326 08/13/23 1332 08/13/23 1350   BP:  (!) 114/55 (!) 128/58   Pulse: 91  92   Resp: (!) 21  18   Temp:      TempSrc:      SpO2: 99%  100%   Weight:      Height:          Abnormal Lab Results:  Labs Reviewed   URINALYSIS, REFLEX TO URINE CULTURE - Abnormal; Notable for the following components:       Result Value    Appearance, UA Hazy (*)     Specific Gravity, UA >=1.030 (*)     Protein, UA 3+ (*)     Ketones, UA Trace (*)     Bilirubin (UA) 1+ (*)     Occult Blood UA 3+ (*)     Nitrite, UA Positive (*)     Leukocytes, UA 2+ (*)     All other components within normal limits    Narrative:     Specimen Source->Urine   URINALYSIS MICROSCOPIC - Abnormal; Notable for the following components:    RBC, UA 30 (*)     WBC, UA >100 (*)     Bacteria Few (*)     All other components within normal limits    Narrative:     Specimen Source->Urine   CBC W/ AUTO DIFFERENTIAL - Abnormal; Notable for the following components:    Platelets 147 (*)     Immature Granulocytes 1.0 (*)     Immature Grans (Abs) 0.08 (*)     Lymph # 0.5 (*)     Gran % 85.5 (*)     Lymph % 6.0 (*)     All other components within normal limits   COMPREHENSIVE METABOLIC PANEL - Abnormal; Notable for the following components:    Sodium 133 (*)     Potassium 2.8 (*)     CO2 14 (*)     BUN 32 (*)     Creatinine 1.5 (*)     Calcium 8.1 (*)     Albumin 2.9 (*)     eGFR 35 (*)     All other components within normal limits   PROCALCITONIN - Abnormal; Notable for the following components:    Procalcitonin 0.30 (*)     All other components within normal limits   MAGNESIUM - Abnormal; Notable for the following components:    Magnesium 1.2 (*)     All other components within normal limits   CULTURE, URINE   CULTURE, BLOOD   CULTURE, BLOOD   CLOSTRIDIUM DIFFICILE   LACTIC ACID, PLASMA   MAGNESIUM   LIPASE   LIPASE        All Lab Results:  Results for orders placed or performed during the hospital " encounter of 08/13/23   Urinalysis, Reflex to Urine Culture Urine, Clean Catch    Specimen: Urine   Result Value Ref Range    Specimen UA Urine, Clean Catch     Color, UA Yellow Yellow, Straw, Jeanie    Appearance, UA Hazy (A) Clear    pH, UA 6.0 5.0 - 8.0    Specific Gravity, UA >=1.030 (A) 1.005 - 1.030    Protein, UA 3+ (A) Negative    Glucose, UA Negative Negative    Ketones, UA Trace (A) Negative    Bilirubin (UA) 1+ (A) Negative    Occult Blood UA 3+ (A) Negative    Nitrite, UA Positive (A) Negative    Urobilinogen, UA 1.0 <2.0 EU/dL    Leukocytes, UA 2+ (A) Negative   Urinalysis Microscopic   Result Value Ref Range    RBC, UA 30 (H) 0 - 4 /hpf    WBC, UA >100 (H) 0 - 5 /hpf    Bacteria Few (A) None-Occ /hpf    Squam Epithel, UA 2 /hpf    Hyaline Casts, UA 1 0-1/lpf /lpf    Microscopic Comment SEE COMMENT    CBC auto differential   Result Value Ref Range    WBC 8.10 3.90 - 12.70 K/uL    RBC 4.10 4.00 - 5.40 M/uL    Hemoglobin 12.5 12.0 - 16.0 g/dL    Hematocrit 37.2 37.0 - 48.5 %    MCV 91 82 - 98 fL    MCH 30.5 27.0 - 31.0 pg    MCHC 33.6 32.0 - 36.0 g/dL    RDW 13.7 11.5 - 14.5 %    Platelets 147 (L) 150 - 450 K/uL    MPV 9.4 9.2 - 12.9 fL    Immature Granulocytes 1.0 (H) 0.0 - 0.5 %    Gran # (ANC) 6.9 1.8 - 7.7 K/uL    Immature Grans (Abs) 0.08 (H) 0.00 - 0.04 K/uL    Lymph # 0.5 (L) 1.0 - 4.8 K/uL    Mono # 0.6 0.3 - 1.0 K/uL    Eos # 0.0 0.0 - 0.5 K/uL    Baso # 0.02 0.00 - 0.20 K/uL    nRBC 0 0 /100 WBC    Gran % 85.5 (H) 38.0 - 73.0 %    Lymph % 6.0 (L) 18.0 - 48.0 %    Mono % 7.3 4.0 - 15.0 %    Eosinophil % 0.0 0.0 - 8.0 %    Basophil % 0.2 0.0 - 1.9 %    Platelet Estimate Appears normal     Aniso Slight     Poik Slight     Ovalocytes Occasional     Differential Method Automated    Comprehensive metabolic panel   Result Value Ref Range    Sodium 133 (L) 136 - 145 mmol/L    Potassium 2.8 (L) 3.5 - 5.1 mmol/L    Chloride 105 95 - 110 mmol/L    CO2 14 (L) 23 - 29 mmol/L    Glucose 91 70 - 110 mg/dL     BUN 32 (H) 8 - 23 mg/dL    Creatinine 1.5 (H) 0.5 - 1.4 mg/dL    Calcium 8.1 (L) 8.7 - 10.5 mg/dL    Total Protein 7.2 6.0 - 8.4 g/dL    Albumin 2.9 (L) 3.5 - 5.2 g/dL    Total Bilirubin 0.5 0.1 - 1.0 mg/dL    Alkaline Phosphatase 56 55 - 135 U/L    AST 32 10 - 40 U/L    ALT 19 10 - 44 U/L    eGFR 35 (A) >60 mL/min/1.73 m^2    Anion Gap 14 8 - 16 mmol/L   Procalcitonin   Result Value Ref Range    Procalcitonin 0.30 (H) <0.25 ng/mL   Lactic acid, plasma   Result Value Ref Range    Lactate (Lactic Acid) 0.7 0.5 - 2.2 mmol/L   Magnesium   Result Value Ref Range    Magnesium 1.2 (L) 1.6 - 2.6 mg/dL   Troponin I   Result Value Ref Range    Troponin I 0.023 0.000 - 0.026 ng/mL   Lipase   Result Value Ref Range    Lipase 27 4 - 60 U/L     *Note: Due to a large number of results and/or encounters for the requested time period, some results have not been displayed. A complete set of results can be found in Results Review.         Imaging Results:  Imaging Results              X-Ray Abdomen Portable (Final result)  Result time 08/13/23 13:32:50      Final result by Jeramie Obregon MD (08/13/23 13:32:50)                   Impression:      Nonobstructive bowel gas pattern.      Electronically signed by: Jeramie Obregon MD  Date:    08/13/2023  Time:    13:32               Narrative:    EXAMINATION:  XR ABDOMEN PORTABLE    CLINICAL HISTORY:  nausea/vomiting    TECHNIQUE:  AP abdomen    COMPARISON:  None    FINDINGS:  The bowel gas pattern is normal.  Advanced degenerative disc disease throughout the lumbar spine.  Bilateral hip arthroplasties.                                       The EKG was ordered, reviewed, and independently interpreted by the ED provider.  Interpretation time: 10:45  Rate: 91 BPM  Rhythm: normal sinus rhythm  Interpretation: Possible left atrial enlargement. Left axis deviation. Left bundle branch block. No STEMI.  When compared to EKG performed 06/02/2023, there are no significant changes.           The  Emergency Provider reviewed the vital signs and test results, which are outlined above.     ED Discussion     1:07 PM: Discussed case with Dr. Graf ([facility]) who agrees with current care and management of pt and accepts admission.   Admitting Service: Hospital medicine   Admitting Physician: Dr. Graf  Admit to: Obs tele     1:07 PM: Re-evaluated pt. I have discussed test results, shared treatment plan, and the need for admission with patient and family at bedside. Pt and family express understanding at this time and agree with all information. All questions answered. Pt and family have no further questions or concerns at this time. Pt is ready for admit.    1:14 PM: Discussed pt's case with Dr. Graf who requests an abdominal x-ray to rule out obstruction. .       Medical Decision Making:   Differential Diagnosis:   Dehydration  Uti  Electrolyte replacement  Clinical Tests:   Lab Tests: Ordered and Reviewed  Medical Tests: Ordered and Reviewed  ED Management:  78 y/o female hx of diabetes, htn, seizures, CAD presents with dysuria, nausea vomiting over past several days. Lab work reviewed and consistent with HARESH, dehydration, low potassium and magnesium, UTI.  Iv fluids, iv antibiotics and electrolyte replacement given in the ER. ECG reviewed and prolonged qtc noted, unable to give ciprofloxacin, pcn allergy noted, case discussed with pharmacy only 20% cross reactivity and patient received cefdinir in 2022 with no issues, okay to give rocephin.  Needs admit for UTI, dehydration, electrolyte replacement. Hospital med consulted for admission.            ED Medication(s):  Medications   potassium chloride SA CR tablet 40 mEq (has no administration in time range)   amLODIPine tablet 10 mg (has no administration in time range)   eslicarbazepine Tab 400 mg (has no administration in time range)   atorvastatin tablet 40 mg (has no administration in time range)   carvediloL tablet 25 mg (has no administration  in time range)   clopidogreL tablet 75 mg (75 mg Oral Not Given 8/13/23 1415)   roflumilast (DALIRESP) tablet 500 mcg (has no administration in time range)   diltiaZEM 24 hr capsule 240 mg (has no administration in time range)   EScitalopram oxalate tablet 20 mg (has no administration in time range)   fluticasone propionate 50 mcg/actuation nasal spray 50 mcg (has no administration in time range)   gabapentin capsule 200 mg (200 mg Oral Given 8/13/23 1416)   levETIRAcetam tablet 500 mg (has no administration in time range)   latanoprost 0.005 % ophthalmic solution 1 drop (has no administration in time range)   oxybutynin 24 hr tablet 10 mg (10 mg Oral Not Given 8/13/23 1415)   pantoprazole EC tablet 40 mg (has no administration in time range)   sodium chloride 0.9% flush 10 mL (has no administration in time range)   naloxone 0.4 mg/mL injection 0.02 mg (has no administration in time range)   glucose chewable tablet 16 g (has no administration in time range)   glucose chewable tablet 24 g (has no administration in time range)   glucagon (human recombinant) injection 1 mg (has no administration in time range)   enoxaparin injection 40 mg (has no administration in time range)   ondansetron injection 8 mg (has no administration in time range)   insulin aspart U-100 pen 0-5 Units (has no administration in time range)   acetaminophen tablet 650 mg (has no administration in time range)   cefTRIAXone (ROCEPHIN) 1 g in dextrose 5 % in water (D5W) 100 mL IVPB (MB+) (has no administration in time range)   budesonide nebulizer solution 0.5 mg (has no administration in time range)   arformoteroL nebulizer solution 15 mcg (has no administration in time range)   albuterol-ipratropium 2.5 mg-0.5 mg/3 mL nebulizer solution 3 mL (has no administration in time range)   metronidazole IVPB 500 mg (500 mg Intravenous New Bag 8/13/23 1437)   hydrALAZINE injection 10 mg (has no administration in time range)   lactated ringers infusion (  Intravenous New Bag 8/13/23 1530)   cholecalciferol (vitamin D3) 125 mcg (5,000 unit) tablet 50,000 Units (50,000 Units Oral Not Given 8/13/23 1415)   sodium chloride 0.9% bolus 1,000 mL 1,000 mL (0 mLs Intravenous Stopped 8/13/23 1219)   cefTRIAXone (ROCEPHIN) 1 g in dextrose 5 % in water (D5W) 100 mL IVPB (MB+) (0 g Intravenous Stopped 8/13/23 1222)   ondansetron injection 4 mg (4 mg Intravenous Given 8/13/23 1152)   potassium bicarbonate disintegrating tablet 25 mEq (25 mEq Oral Given 8/13/23 1207)   potassium chloride 10 mEq in 100 mL IVPB (10 mEq Intravenous New Bag 8/13/23 1237)   magnesium sulfate 2g in water 50mL IVPB (premix) (0 g Intravenous Stopped 8/13/23 1424)   magnesium sulfate in dextrose IVPB (premix) 1 g (1 g Intravenous New Bag 8/13/23 1432)   pantoprazole injection 80 mg (80 mg Intravenous Given by Other 8/13/23 1448)       Current Discharge Medication List                  Scribe Attestation:   Scribe #1: I performed the above scribed service and the documentation accurately describes the services I performed. I attest to the accuracy of the note.     Attending:   Physician Attestation Statement for Scribe #1: I, Lazara Dalal MD, personally performed the services described in this documentation, as scribed by Xi Reynaga, in my presence, and it is both accurate and complete.           Clinical Impression       ICD-10-CM ICD-9-CM   1. HARESH (acute kidney injury)  N17.9 584.9   2. Weakness  R53.1 780.79   3. Dehydration  E86.0 276.51   4. Acute cystitis without hematuria  N30.00 595.0   5. Hypokalemia  E87.6 276.8   6. Hypomagnesemia  E83.42 275.2   7. Chest pain  R07.9 786.50       Disposition:   Disposition: Placed in Observation  Condition: Lazara Jc MD  08/13/23 1393

## 2023-08-13 NOTE — ASSESSMENT & PLAN NOTE
Lab Results   Component Value Date    HGBA1C 5.1 10/26/2022   --controlled  --Home medications include metformin- held upon admission  --low dose SSI ordered  --Accuchecks [facility]  --Fasting AM glucose goal <140

## 2023-08-13 NOTE — ASSESSMENT & PLAN NOTE
--urinalysis results suggestive of infection  --no prior culture data available for review  --s/p IV Rocephin in the ER- continue upon admission  --follow urine and blood cultures  --monitor fever curve- Tylenol PRN

## 2023-08-13 NOTE — PLAN OF CARE
Pt AAO   SR on tele   Blood pressure stable   Room air   Isolation for R/O cdiff   Stools order for collection   Unable to collect specimen per urine contaminant   Flagyl IV   LR at 75  K and Mag riders administered   Labs in AM   Lovenox for VTE  BG AC and HS   24 hour chart check completed

## 2023-08-13 NOTE — HPI
Patient is a 77-year-old woman with a past medical history of COPD, non-insulin-dependent type 2 diabetes mellitus, CAD, essential hypertension with dyslipidemia, history of stroke, GERD, and glaucoma who presented to the ER with intractable nausea with NBNB vomiting.  Onset of symptoms 3-4 days prior to admission, associated with epigastric abdominal pain and frequent nonbloody diarrhea (3-4 episodes a day).  Patient reports symptoms were mild at 1st but soon progressed to the point where she was unable to tolerate any food and was becoming weaker and weaker.  This is what prompted her to come into the ER for evaluation.  No prior history of similar symptoms, no new medication changes, no known sick contacts, no changes in diet, no fevers, chills, chest pain, shortness of breath or skin rashes.    In the ER, patient's vital signs were grossly within normal limits and she remained hemodynamically stable since arrival.  Routine labs showed several abnormalities, including a abnormal urinalysis suggestive of UTI, a mildly elevated procalcitonin of 0.3, hypomagnesemia of 1.2, acute kidney injury with Cr of 1.5  (baseline approximately 0.6), hypokalemia of 2.8, CO2 of 14 and a hypoalbuminemia of 2.9.  Pertinent negatives include a normal lactic acid was 0.7, normal lipase, normal WBC.  No imaging obtained in the ER for review.  Hospital Medicine called for admission.

## 2023-08-13 NOTE — ASSESSMENT & PLAN NOTE
Cr bumped from baseline, GFR moderately decreased from previous lab draws. Suspect prerenal given history and presentation  --will hold home losartan given nephrotoxic profile  --avoid nephrotoxic agents while hospitalized  --renally dose all other home meds (Gabapentin, Ranexa)  --daily BMP to monitor renal fxn   --has hx of bilateral renal cysts, consider Retroperitoneal US if renal function fails to improve  --gentle IVFs

## 2023-08-13 NOTE — ASSESSMENT & PLAN NOTE
--seemingly stable  --Last ECHO from JUNE 2023 confirm EF 50 %  --no signs of fluid overload on exam   --monitor electrolytes, UOP  --strict I&O and daily weights ordered

## 2023-08-13 NOTE — ASSESSMENT & PLAN NOTE
--reflux and/or gastric ulcer on ddx for presenting symptoms  --will give a 1x dose of IV PPI 80 mg   --restart home PPI but at twice a day  --monitor clinically

## 2023-08-13 NOTE — ASSESSMENT & PLAN NOTE
Etiology unknown. Patient could not recall any risk factors at the time of our encounter. Ddx will default to the most common etiologis: Gastritis, ulcer, reflux, esophagitis, pancreatitis, SBO/Ileus (low suspicion)   --more controlled at the time of our encounter  --maintain on PRN IV therapy for now, modify as necessary  --Lipase and LFTs are normal  --abdominal XR ordered/pending  --procalcitonin mildly elevated- will add on Flagyl to Rocephin regimen to cover for intraabdominal infxn  --IV PPI 80 mg x1 dose, restart home PPI regimen thereafter  --monitor clinically  --gentle IVFs, CLD, advance slowly  --if persists, consider abdominal CT to further assess

## 2023-08-13 NOTE — SUBJECTIVE & OBJECTIVE
Past Medical History:   Diagnosis Date    Abnormal ECG 8/5/2019    Aneurysm     Anticoagulant long-term use     Arthritis     CAD in native artery 8/5/2019    COPD (chronic obstructive pulmonary disease)     Diabetes mellitus     Fall 10/10/2019    Formatting of this note might be different from the original. Found sitting on floor next to bed last night Mild confusion today Does not recall falling or how she ended up on floor UA today Labs yesterday unremarkable    Glaucoma     Hypertension     Seizures     Shingles 05/27/2017    Stroke        Past Surgical History:   Procedure Laterality Date    BRAIN SURGERY      CARDIAC CATHETERIZATION      CORONARY ANGIOPLASTY      EPIDURAL STEROID INJECTION INTO LUMBAR SPINE Bilateral 11/12/2019    Procedure: TF XANDER L4/5;  Surgeon: Desean Dias MD;  Location: [facility];  Service: Pain Management;  Laterality: Bilateral;    HYSTERECTOMY      INJECTION OF ANESTHETIC AGENT AROUND MEDIAL BRANCH NERVES INNERVATING LUMBAR FACET JOINT Bilateral 1/31/2020    Procedure: Bilateral L3-5 MBB;  Surgeon: Desean Dias MD;  Location: [facility];  Service: Pain Management;  Laterality: Bilateral;    INJECTION OF ANESTHETIC AGENT INTO SACROILIAC JOINT Right 11/16/2021    Procedure: Right BLOCK, SACROILIAC JOINT Right GTB with RN IV sedation;  Surgeon: Yao Fulton MD;  Location: [facility];  Service: Pain Management;  Laterality: Right;    INJECTION OF ANESTHETIC AGENT INTO SACROILIAC JOINT Bilateral 7/28/2023    Procedure: Bilateral GT bursa + bilateral SIJ injection;  Surgeon: Francisco Oshea MD;  Location: [facility];  Service: Pain Management;  Laterality: Bilateral;    INJECTION OF JOINT Bilateral 7/9/2020    Procedure: Bilateral shoulder GH Joint injection with local;  Surgeon: Desean Dias MD;  Location: [facility];  Service: Pain Management;  Laterality: Bilateral;    INJECTION OF JOINT Bilateral 7/28/2023    Procedure: Bilateral GT bursa + bilateral SIJ  injection NEEDS ADDITIONAL SEDATION AND MORE TIME BEFORE INJECTION RN IV Sedation;  Surgeon: Francisco Oshea MD;  Location: [facility];  Service: Pain Management;  Laterality: Bilateral;    SELECTIVE INJECTION OF ANESTHETIC AGENT AROUND LUMBAR SPINAL NERVE ROOT BY TRANSFORAMINAL APPROACH Bilateral 4/26/2023    Procedure: Bilateral L4/5 TF XANDER RN IV Sedation;  Surgeon: Francisco Oshea MD;  Location: [facility];  Service: Pain Management;  Laterality: Bilateral;    TRANSFORAMINAL EPIDURAL INJECTION OF STEROID Bilateral 7/23/2019    Procedure: Bilateral L3/4 Transforaminal Epidural Steroid Injection;  Surgeon: Desean Dias MD;  Location: [facility];  Service: Pain Management;  Laterality: Bilateral;    TRANSFORAMINAL EPIDURAL INJECTION OF STEROID Bilateral 3/10/2020    Procedure: Right T12/L1 TF XANDER with local;  Surgeon: Desean Dias MD;  [facility];  Service: Pain Management;  Laterality: Bilateral;    TRANSFORAMINAL EPIDURAL INJECTION OF STEROID Right 6/19/2020    Procedure: Right T12/L1 TFESI Covid day of procedure;  Surgeon: Desean Dias MD;  Location: [facility];  Service: Pain Management;  Laterality: Right;       Review of patient's allergies indicates:   Allergen Reactions    Penicillins Anaphylaxis, Hives, Shortness Of Breath and Swelling    Adhesive Rash    Doxycycline Nausea Only    Oxycodone Other (See Comments)     Fatigue      Sulfa (sulfonamide antibiotics) Itching       No current facility-administered medications on file prior to encounter.     Current Outpatient Medications on File Prior to Encounter   Medication Sig    albuterol (PROVENTIL/VENTOLIN HFA) 90 mcg/actuation inhaler INHALE TWO PUFFS INTO THE LUNGS EVERY 4 HOURS AS NEEDED    amLODIPine (NORVASC) 10 MG tablet TAKE 1 TABLET BY MOUTH ONCE A DAY    APTIOM 400 mg Tab tablet Take 400 mg by mouth.    atorvastatin (LIPITOR) 40 MG tablet TAKE 1 TABLET BY MOUTH ONCE DAILY    carvediloL (COREG) 25 MG tablet Take 1  tablet (25 mg total) by mouth 2 (two) times daily with meals.    cholecalciferol, vitamin D3, 1,250 mcg (50,000 unit) capsule Take 1 capsule (50,000 Units total) by mouth every 7 days.    clopidogreL (PLAVIX) 75 mg tablet Take 1 tablet (75 mg total) by mouth once daily.    DALIRESP 500 mcg Tab TAKE 1 TABLET BY MOUTH ONCE DAILY    diltiaZEM (CARDIZEM CD) 120 MG Cp24 Take 2 capsules (240 mg total) by mouth once daily.    dorzolamide-timolol 2-0.5% (COSOPT) 22.3-6.8 mg/mL ophthalmic solution Place 1 drop into both eyes every 12 (twelve) hours.    EScitalopram oxalate (LEXAPRO) 20 MG tablet TAKE 1 TABLET BY MOUTH ONCE DAILY    fluticasone-umeclidin-vilanter (TRELEGY ELLIPTA) 200-62.5-25 mcg inhaler INHALE 1 PUFF BY MOUTH ONCE A DAY    gabapentin (NEURONTIN) 300 MG capsule Take 1 capsule (300 mg total) by mouth 3 (three) times daily.    ipratropium (ATROVENT) 42 mcg (0.06 %) nasal spray USE 2 SPRAYS INTO EACH NOSTRIL FOUR TIMES DAILY    isosorbide mononitrate (IMDUR) 60 MG 24 hr tablet Take 1 tablet (60 mg total) by mouth every evening.    latanoprost 0.005 % ophthalmic solution Place 1 drop into both eyes every evening.    levETIRAcetam (KEPPRA) 500 MG Tab Take 1 tablet (500 mg total) by mouth 2 (two) times daily.    magnesium oxide (MAG-OX) 400 mg (241.3 mg magnesium) tablet Take 1 tablet (400 mg total) by mouth once daily.    metFORMIN (GLUCOPHAGE) 500 MG tablet TAKE 1 TABLET BY MOUTH ONCE DAILY    MYRBETRIQ 50 mg Tb24 TAKE 1 TABLET BY MOUTH ONCE DAILY    nabumetone (RELAFEN) 500 MG tablet Take 1 tablet (500 mg total) by mouth 2 (two) times daily.    potassium chloride SA (K-DUR,KLOR-CON) 20 MEQ tablet Take 1 tablet (20 mEq total) by mouth once daily. Take extra dose with Lasix - fluid pill    ranolazine (RANEXA) 1,000 mg Tb12 TAKE 1 TABLET BY MOUTH TWICE DAILY    valsartan (DIOVAN) 320 MG tablet Take 1 tablet (320 mg total) by mouth once daily.    albuterol (PROVENTIL) 2.5 mg /3 mL (0.083 %) nebulizer solution Take  2.5 mg by nebulization every 6 (six) hours as needed for Wheezing. Rescue    denosumab (PROLIA) 60 mg/mL Syrg every 6 (six) months.    fluticasone propionate (FLONASE) 50 mcg/actuation nasal spray USE 2 SPRAYS INTO EACH NOSTRIL ONCE A DAY AT 6AM AS DIRECTED    ketoconazole (NIZORAL) 2 % cream Apply topically 2 (two) times daily. For dry flaky patches of ear.    montelukast (SINGULAIR) 10 mg tablet TAKE 1 TABLET BY MOUTH ONCE A DAY    nitroGLYCERIN (NITROSTAT) 0.4 MG SL tablet DISSOLVE 1 TABLET UNDER TONGUE EVERY 5 MINUTES FOR CHEST PAIN (MAY TAKE UP TO 3 DOSES THEN SEEK MEDICAL ATTENTION)    nortriptyline (PAMELOR) 25 MG capsule Take 1 capsule (25 mg total) by mouth every evening.    omeprazole (PRILOSEC) 40 MG capsule TAKE 1 CAPSULE BY MOUTH ONCE DAILY IN THE MORNING    tiZANidine (ZANAFLEX) 4 MG tablet TAKE 1 TABLET BY MOUTH TWICE DAILY AS NEEDED    triamcinolone acetonide 0.025% (KENALOG) 0.025 % cream Apply topically 2 (two) times daily. PRN rash and itching of ear. Mild steroid cream.     Family History       Problem Relation (Age of Onset)    No Known Problems Mother, Father          Tobacco Use    Smoking status: Former     Current packs/day: 0.00     Average packs/day: 1 pack/day for 42.3 years (42.3 ttl pk-yrs)     Types: Cigarettes     Start date:      Quit date: 2004     Years since quittin.3    Smokeless tobacco: Never   Substance and Sexual Activity    Alcohol use: No    Drug use: Never    Sexual activity: Not Currently     Partners: Male     Review of Systems   Constitutional:  Positive for fatigue. Negative for chills and fever.   HENT:  Negative for congestion, postnasal drip, rhinorrhea and sore throat.    Eyes:  Positive for visual disturbance. Negative for pain.   Respiratory:  Negative for cough, chest tightness, shortness of breath and wheezing.    Cardiovascular:  Negative for chest pain, palpitations and leg swelling.   Gastrointestinal:  Positive for abdominal pain, diarrhea,  nausea and vomiting. Negative for abdominal distention and constipation.   Genitourinary:  Positive for dysuria. Negative for difficulty urinating, flank pain and hematuria.   Musculoskeletal:  Negative for arthralgias, back pain, gait problem and joint swelling.   Skin:  Negative for pallor and rash.   Neurological:  Positive for weakness. Negative for dizziness and syncope.   Psychiatric/Behavioral:  Negative for confusion, decreased concentration and suicidal ideas.      Objective:     Vital Signs (Most Recent):  Temp: 98.2 °F (36.8 °C) (08/13/23 0850)  Pulse: 91 (08/13/23 1326)  Resp: (!) 21 (08/13/23 1326)  BP: 130/61 (08/13/23 1232)  SpO2: 99 % (08/13/23 1326) Vital Signs (24h Range):  Temp:  [98.2 °F (36.8 °C)] 98.2 °F (36.8 °C)  Pulse:  [90-95] 91  Resp:  [16-21] 21  SpO2:  [96 %-99 %] 99 %  BP: (113-130)/(55-61) 130/61     Weight: 53 kg (116 lb 13.5 oz)  Body mass index is 20.06 kg/m².     Physical Exam  Vitals reviewed.   Constitutional:       General: She is not in acute distress.     Appearance: Normal appearance. She is normal weight. She is not ill-appearing or toxic-appearing.   HENT:      Head: Normocephalic and atraumatic.      Nose: Nose normal. No congestion or rhinorrhea.   Eyes:      Extraocular Movements: Extraocular movements intact.      Conjunctiva/sclera: Conjunctivae normal.      Pupils: Pupils are equal, round, and reactive to light.   Cardiovascular:      Rate and Rhythm: Normal rate and regular rhythm.      Pulses: Normal pulses.      Heart sounds: No murmur heard.  Pulmonary:      Effort: Pulmonary effort is normal. No respiratory distress.      Breath sounds: Normal breath sounds. No wheezing.   Abdominal:      General: Abdomen is flat. Bowel sounds are normal. There is no distension.      Palpations: Abdomen is soft.      Tenderness: There is no abdominal tenderness. There is no guarding or rebound.   Musculoskeletal:         General: No swelling, tenderness or deformity. Normal  "range of motion.      Cervical back: Normal range of motion and neck supple. No rigidity.   Skin:     General: Skin is warm and dry.      Capillary Refill: Capillary refill takes less than 2 seconds.      Coloration: Skin is not pale.   Neurological:      General: No focal deficit present.      Mental Status: She is alert and oriented to person, place, and time. Mental status is at baseline.      Motor: No weakness.      Gait: Gait normal.   Psychiatric:         Mood and Affect: Mood normal.         Behavior: Behavior normal.         Thought Content: Thought content normal.              CRANIAL NERVES     CN III, IV, VI   Pupils are equal, round, and reactive to light.       Significant Labs: BMP:   Recent Labs   Lab 08/13/23  1115   GLU 91   *   K 2.8*      CO2 14*   BUN 32*   CREATININE 1.5*   CALCIUM 8.1*   MG 1.2*     CBC:   Recent Labs   Lab 08/13/23  1115   WBC 8.10   HGB 12.5   HCT 37.2   *     CMP:   Recent Labs   Lab 08/13/23  1115   *   K 2.8*      CO2 14*   GLU 91   BUN 32*   CREATININE 1.5*   CALCIUM 8.1*   PROT 7.2   ALBUMIN 2.9*   BILITOT 0.5   ALKPHOS 56   AST 32   ALT 19   ANIONGAP 14     Cardiac Markers: No results for input(s): "CKMB", "MYOGLOBIN", "BNP", "TROPISTAT" in the last 48 hours.  Coagulation: No results for input(s): "PT", "INR", "APTT" in the last 48 hours.  Lactic Acid:   Recent Labs   Lab 08/13/23  1115   LACTATE 0.7     Magnesium:   Recent Labs   Lab 08/13/23  1115   MG 1.2*     Troponin: No results for input(s): "TROPONINI", "TROPONINIHS" in the last 48 hours.  TSH: No results for input(s): "TSH" in the last 4320 hours.  Urine Studies:   Recent Labs   Lab 08/13/23  0901   COLORU Yellow   APPEARANCEUA Hazy*   PHUR 6.0   SPECGRAV >=1.030*   PROTEINUA 3+*   GLUCUA Negative   KETONESU Trace*   BILIRUBINUA 1+*   OCCULTUA 3+*   NITRITE Positive*   UROBILINOGEN 1.0   LEUKOCYTESUR 2+*   RBCUA 30*   WBCUA >100*   BACTERIA Few*   SQUAMEPITHEL 2   HYALINECASTS 1 "         Significant Imaging: I have reviewed all pertinent imaging results/findings within the past 24 hours.

## 2023-08-13 NOTE — H&P
[facility] Medicine  History & Physical    Patient Name: Shireen Felix  MRN: [id number]  Patient Class: OP- Observation  Admission Date: 8/13/2023  Attending Physician: Everardo Graf MD   Primary Care Provider: Laron Chaudhari MD         Patient information was obtained from patient, past medical records and ER records.     Subjective:     Principal Problem:HARESH (acute kidney injury)    Chief Complaint:   Chief Complaint   Patient presents with    Dysuria     Pt. C/O severe dysuria and lower back pain. Also c/o NVD x 3d.    Vomiting    Diarrhea        HPI: Patient is a 77-year-old woman with a past medical history of COPD, non-insulin-dependent type 2 diabetes mellitus, CAD, essential hypertension with dyslipidemia, history of stroke, GERD, and glaucoma who presented to the ER with intractable nausea with NBNB vomiting.  Onset of symptoms 3-4 days prior to admission, associated with epigastric abdominal pain and frequent nonbloody diarrhea (3-4 episodes a day).  Patient reports symptoms were mild at 1st but soon progressed to the point where she was unable to tolerate any food and was becoming weaker and weaker.  This is what prompted her to come into the ER for evaluation.  No prior history of similar symptoms, no new medication changes, no known sick contacts, no changes in diet, no fevers, chills, chest pain, shortness of breath or skin rashes.    In the ER, patient's vital signs were grossly within normal limits and she remained hemodynamically stable since arrival.  Routine labs showed several abnormalities, including a abnormal urinalysis suggestive of UTI, a mildly elevated procalcitonin of 0.3, hypomagnesemia of 1.2, acute kidney injury with Cr of 1.5  (baseline approximately 0.6), hypokalemia of 2.8, CO2 of 14 and a hypoalbuminemia of 2.9.  Pertinent negatives include a normal lactic acid was 0.7, normal lipase, normal WBC.  No imaging obtained in the ER for  review.  Hospital Medicine called for admission.      Past Medical History:   Diagnosis Date    Abnormal ECG 8/5/2019    Aneurysm     Anticoagulant long-term use     Arthritis     CAD in native artery 8/5/2019    COPD (chronic obstructive pulmonary disease)     Diabetes mellitus     Fall 10/10/2019    Formatting of this note might be different from the original. Found sitting on floor next to bed last night Mild confusion today Does not recall falling or how she ended up on floor UA today Labs yesterday unremarkable    Glaucoma     Hypertension     Seizures     Shingles 05/27/2017    Stroke        Past Surgical History:   Procedure Laterality Date    BRAIN SURGERY      CARDIAC CATHETERIZATION      CORONARY ANGIOPLASTY      EPIDURAL STEROID INJECTION INTO LUMBAR SPINE Bilateral 11/12/2019    Procedure: TF XANDER L4/5;  Surgeon: Desean Dias MD;  Location: [facility];  Service: Pain Management;  Laterality: Bilateral;    HYSTERECTOMY      INJECTION OF ANESTHETIC AGENT AROUND MEDIAL BRANCH NERVES INNERVATING LUMBAR FACET JOINT Bilateral 1/31/2020    Procedure: Bilateral L3-5 MBB;  Surgeon: Desean Dias MD;  Location: [facility];  Service: Pain Management;  Laterality: Bilateral;    INJECTION OF ANESTHETIC AGENT INTO SACROILIAC JOINT Right 11/16/2021    Procedure: Right BLOCK, SACROILIAC JOINT Right GTB with RN IV sedation;  Surgeon: Yao Fulton MD;  Location: [facility];  Service: Pain Management;  Laterality: Right;    INJECTION OF ANESTHETIC AGENT INTO SACROILIAC JOINT Bilateral 7/28/2023    Procedure: Bilateral GT bursa + bilateral SIJ injection;  Surgeon: Francisco Oshea MD;  Location: [facility];  Service: Pain Management;  Laterality: Bilateral;    INJECTION OF JOINT Bilateral 7/9/2020    Procedure: Bilateral shoulder GH Joint injection with local;  Surgeon: Desean Dias MD;  Location: [facility];  Service: Pain Management;  Laterality: Bilateral;    INJECTION OF  JOINT Bilateral 7/28/2023    Procedure: Bilateral GT bursa + bilateral SIJ injection NEEDS ADDITIONAL SEDATION AND MORE TIME BEFORE INJECTION RN IV Sedation;  Surgeon: Francisco Oshea MD;  Location: [facility];  Service: Pain Management;  Laterality: Bilateral;    SELECTIVE INJECTION OF ANESTHETIC AGENT AROUND LUMBAR SPINAL NERVE ROOT BY TRANSFORAMINAL APPROACH Bilateral 4/26/2023    Procedure: Bilateral L4/5 TF XANDER RN IV Sedation;  Surgeon: Francisco Oshea MD;  Location: [facility];  Service: Pain Management;  Laterality: Bilateral;    TRANSFORAMINAL EPIDURAL INJECTION OF STEROID Bilateral 7/23/2019    Procedure: Bilateral L3/4 Transforaminal Epidural Steroid Injection;  Surgeon: Desean Dias MD;  Location: [facility];  Service: Pain Management;  Laterality: Bilateral;    TRANSFORAMINAL EPIDURAL INJECTION OF STEROID Bilateral 3/10/2020    Procedure: Right T12/L1 TF XANDER with local;  Surgeon: Desean Dias MD;  Location: [facility];  Service: Pain Management;  Laterality: Bilateral;    TRANSFORAMINAL EPIDURAL INJECTION OF STEROID Right 6/19/2020    Procedure: Right T12/L1 TFESI Covid day of procedure;  Surgeon: Desean Dias MD;  Location: [facility];  Service: Pain Management;  Laterality: Right;       Review of patient's allergies indicates:   Allergen Reactions    Penicillins Anaphylaxis, Hives, Shortness Of Breath and Swelling    Adhesive Rash    Doxycycline Nausea Only    Oxycodone Other (See Comments)     Fatigue      Sulfa (sulfonamide antibiotics) Itching       No current facility-administered medications on file prior to encounter.     Current Outpatient Medications on File Prior to Encounter   Medication Sig    albuterol (PROVENTIL/VENTOLIN HFA) 90 mcg/actuation inhaler INHALE TWO PUFFS INTO THE LUNGS EVERY 4 HOURS AS NEEDED    amLODIPine (NORVASC) 10 MG tablet TAKE 1 TABLET BY MOUTH ONCE A DAY    APTIOM 400 mg Tab tablet Take 400 mg by mouth.    atorvastatin (LIPITOR) 40  MG tablet TAKE 1 TABLET BY MOUTH ONCE DAILY    carvediloL (COREG) 25 MG tablet Take 1 tablet (25 mg total) by mouth 2 (two) times daily with meals.    cholecalciferol, vitamin D3, 1,250 mcg (50,000 unit) capsule Take 1 capsule (50,000 Units total) by mouth every 7 days.    clopidogreL (PLAVIX) 75 mg tablet Take 1 tablet (75 mg total) by mouth once daily.    DALIRESP 500 mcg Tab TAKE 1 TABLET BY MOUTH ONCE DAILY    diltiaZEM (CARDIZEM CD) 120 MG Cp24 Take 2 capsules (240 mg total) by mouth once daily.    dorzolamide-timolol 2-0.5% (COSOPT) 22.3-6.8 mg/mL ophthalmic solution Place 1 drop into both eyes every 12 (twelve) hours.    EScitalopram oxalate (LEXAPRO) 20 MG tablet TAKE 1 TABLET BY MOUTH ONCE DAILY    fluticasone-umeclidin-vilanter (TRELEGY ELLIPTA) 200-62.5-25 mcg inhaler INHALE 1 PUFF BY MOUTH ONCE A DAY    gabapentin (NEURONTIN) 300 MG capsule Take 1 capsule (300 mg total) by mouth 3 (three) times daily.    ipratropium (ATROVENT) 42 mcg (0.06 %) nasal spray USE 2 SPRAYS INTO EACH NOSTRIL FOUR TIMES DAILY    isosorbide mononitrate (IMDUR) 60 MG 24 hr tablet Take 1 tablet (60 mg total) by mouth every evening.    latanoprost 0.005 % ophthalmic solution Place 1 drop into both eyes every evening.    levETIRAcetam (KEPPRA) 500 MG Tab Take 1 tablet (500 mg total) by mouth 2 (two) times daily.    magnesium oxide (MAG-OX) 400 mg (241.3 mg magnesium) tablet Take 1 tablet (400 mg total) by mouth once daily.    metFORMIN (GLUCOPHAGE) 500 MG tablet TAKE 1 TABLET BY MOUTH ONCE DAILY    MYRBETRIQ 50 mg Tb24 TAKE 1 TABLET BY MOUTH ONCE DAILY    nabumetone (RELAFEN) 500 MG tablet Take 1 tablet (500 mg total) by mouth 2 (two) times daily.    potassium chloride SA (K-DUR,KLOR-CON) 20 MEQ tablet Take 1 tablet (20 mEq total) by mouth once daily. Take extra dose with Lasix - fluid pill    ranolazine (RANEXA) 1,000 mg Tb12 TAKE 1 TABLET BY MOUTH TWICE DAILY    valsartan (DIOVAN) 320 MG tablet Take 1 tablet  (320 mg total) by mouth once daily.    albuterol (PROVENTIL) 2.5 mg /3 mL (0.083 %) nebulizer solution Take 2.5 mg by nebulization every 6 (six) hours as needed for Wheezing. Rescue    denosumab (PROLIA) 60 mg/mL Syrg every 6 (six) months.    fluticasone propionate (FLONASE) 50 mcg/actuation nasal spray USE 2 SPRAYS INTO EACH NOSTRIL ONCE A DAY AT 6AM AS DIRECTED    ketoconazole (NIZORAL) 2 % cream Apply topically 2 (two) times daily. For dry flaky patches of ear.    montelukast (SINGULAIR) 10 mg tablet TAKE 1 TABLET BY MOUTH ONCE A DAY    nitroGLYCERIN (NITROSTAT) 0.4 MG SL tablet DISSOLVE 1 TABLET UNDER TONGUE EVERY 5 MINUTES FOR CHEST PAIN (MAY TAKE UP TO 3 DOSES THEN SEEK MEDICAL ATTENTION)    nortriptyline (PAMELOR) 25 MG capsule Take 1 capsule (25 mg total) by mouth every evening.    omeprazole (PRILOSEC) 40 MG capsule TAKE 1 CAPSULE BY MOUTH ONCE DAILY IN THE MORNING    tiZANidine (ZANAFLEX) 4 MG tablet TAKE 1 TABLET BY MOUTH TWICE DAILY AS NEEDED    triamcinolone acetonide 0.025% (KENALOG) 0.025 % cream Apply topically 2 (two) times daily. PRN rash and itching of ear. Mild steroid cream.     Family History       Problem Relation (Age of Onset)    No Known Problems Mother, Father          Tobacco Use    Smoking status: Former     Current packs/day: 0.00     Average packs/day: 1 pack/day for 42.3 years (42.3 ttl pk-yrs)     Types: Cigarettes     Start date:      Quit date: 2004     Years since quittin.3    Smokeless tobacco: Never   Substance and Sexual Activity    Alcohol use: No    Drug use: Never    Sexual activity: Not Currently     Partners: Male     Review of Systems   Constitutional:  Positive for fatigue. Negative for chills and fever.   HENT:  Negative for congestion, postnasal drip, rhinorrhea and sore throat.    Eyes:  Positive for visual disturbance. Negative for pain.   Respiratory:  Negative for cough, chest tightness, shortness of breath and wheezing.     Cardiovascular:  Negative for chest pain, palpitations and leg swelling.   Gastrointestinal:  Positive for abdominal pain, diarrhea, nausea and vomiting. Negative for abdominal distention and constipation.   Genitourinary:  Positive for dysuria. Negative for difficulty urinating, flank pain and hematuria.   Musculoskeletal:  Negative for arthralgias, back pain, gait problem and joint swelling.   Skin:  Negative for pallor and rash.   Neurological:  Positive for weakness. Negative for dizziness and syncope.   Psychiatric/Behavioral:  Negative for confusion, decreased concentration and suicidal ideas.      Objective:     Vital Signs (Most Recent):  Temp: 98.2 °F (36.8 °C) (08/13/23 0850)  Pulse: 91 (08/13/23 1326)  Resp: (!) 21 (08/13/23 1326)  BP: 130/61 (08/13/23 1232)  SpO2: 99 % (08/13/23 1326) Vital Signs (24h Range):  Temp:  [98.2 °F (36.8 °C)] 98.2 °F (36.8 °C)  Pulse:  [90-95] 91  Resp:  [16-21] 21  SpO2:  [96 %-99 %] 99 %  BP: (113-130)/(55-61) 130/61     Weight: 53 kg (116 lb 13.5 oz)  Body mass index is 20.06 kg/m².     Physical Exam  Vitals reviewed.   Constitutional:       General: She is not in acute distress.     Appearance: Normal appearance. She is normal weight. She is not ill-appearing or toxic-appearing.   HENT:      Head: Normocephalic and atraumatic.      Nose: Nose normal. No congestion or rhinorrhea.   Eyes:      Extraocular Movements: Extraocular movements intact.      Conjunctiva/sclera: Conjunctivae normal.      Pupils: Pupils are equal, round, and reactive to light.   Cardiovascular:      Rate and Rhythm: Normal rate and regular rhythm.      Pulses: Normal pulses.      Heart sounds: No murmur heard.  Pulmonary:      Effort: Pulmonary effort is normal. No respiratory distress.      Breath sounds: Normal breath sounds. No wheezing.   Abdominal:      General: Abdomen is flat. Bowel sounds are normal. There is no distension.      Palpations: Abdomen is soft.      Tenderness: There is no  "abdominal tenderness. There is no guarding or rebound.   Musculoskeletal:         General: No swelling, tenderness or deformity. Normal range of motion.      Cervical back: Normal range of motion and neck supple. No rigidity.   Skin:     General: Skin is warm and dry.      Capillary Refill: Capillary refill takes less than 2 seconds.      Coloration: Skin is not pale.   Neurological:      General: No focal deficit present.      Mental Status: She is alert and oriented to person, place, and time. Mental status is at baseline.      Motor: No weakness.      Gait: Gait normal.   Psychiatric:         Mood and Affect: Mood normal.         Behavior: Behavior normal.         Thought Content: Thought content normal.              CRANIAL NERVES     CN III, IV, VI   Pupils are equal, round, and reactive to light.       Significant Labs: BMP:   Recent Labs   Lab 08/13/23  1115   GLU 91   *   K 2.8*      CO2 14*   BUN 32*   CREATININE 1.5*   CALCIUM 8.1*   MG 1.2*     CBC:   Recent Labs   Lab 08/13/23  1115   WBC 8.10   HGB 12.5   HCT 37.2   *     CMP:   Recent Labs   Lab 08/13/23  1115   *   K 2.8*      CO2 14*   GLU 91   BUN 32*   CREATININE 1.5*   CALCIUM 8.1*   PROT 7.2   ALBUMIN 2.9*   BILITOT 0.5   ALKPHOS 56   AST 32   ALT 19   ANIONGAP 14     Cardiac Markers: No results for input(s): "CKMB", "MYOGLOBIN", "BNP", "TROPISTAT" in the last 48 hours.  Coagulation: No results for input(s): "PT", "INR", "APTT" in the last 48 hours.  Lactic Acid:   Recent Labs   Lab 08/13/23  1115   LACTATE 0.7     Magnesium:   Recent Labs   Lab 08/13/23  1115   MG 1.2*     Troponin: No results for input(s): "TROPONINI", "TROPONINIHS" in the last 48 hours.  TSH: No results for input(s): "TSH" in the last 4320 hours.  Urine Studies:   Recent Labs   Lab 08/13/23  0901   COLORU Yellow   APPEARANCEUA Hazy*   PHUR 6.0   SPECGRAV >=1.030*   PROTEINUA 3+*   GLUCUA Negative   KETONESU Trace*   BILIRUBINUA 1+*   OCCULTUA 3+* "   NITRITE Positive*   UROBILINOGEN 1.0   LEUKOCYTESUR 2+*   RBCUA 30*   WBCUA >100*   BACTERIA Few*   SQUAMEPITHEL 2   HYALINECASTS 1         Significant Imaging: I have reviewed all pertinent imaging results/findings within the past 24 hours.    Assessment/Plan:     * HARESH (acute kidney injury)  Cr bumped from baseline, GFR moderately decreased from previous lab draws. Suspect prerenal given history and presentation  --will hold home losartan given nephrotoxic profile  --avoid nephrotoxic agents while hospitalized  --renally dose all other home meds (Gabapentin, Ranexa)  --daily BMP to monitor renal fxn   --has hx of bilateral renal cysts, consider Retroperitoneal US if renal function fails to improve  --gentle IVFs       Acute diarrhea  --odd presentation, no obvious risk factors  --stool studies ordered, of which includes c. Diff- follow up results  --gentle IVFs for hydration      Intractable vomiting with nausea  Abdominal pain  Etiology unknown. Patient could not recall any risk factors at the time of our encounter. Ddx will default to the most common etiologis: Gastritis, ulcer, reflux, esophagitis, pancreatitis, SBO/Ileus (low suspicion)   --more controlled at the time of our encounter  --maintain on PRN IV therapy for now, modify as necessary  --Lipase and LFTs are normal  --abdominal XR ordered/pending  --procalcitonin mildly elevated- will add on Flagyl to Rocephin regimen to cover for intraabdominal infxn  --IV PPI 80 mg x1 dose, restart home PPI regimen thereafter  --monitor clinically  --gentle IVFs, CLD, advance slowly  --if persists, consider abdominal CT to further assess    Acute cystitis without hematuria  --urinalysis results suggestive of infection  --no prior culture data available for review  --s/p IV Rocephin in the ER- continue upon admission  --follow urine and blood cultures  --monitor fever curve- Tylenol PRN    Hypokalemia  --2.8 on admission, likely 2/2 GI losses  --replaced IV & PO in  the ER  --Magnesium low, s/p 3g IV replacement  --repeat in the AM, trend daily  --cardiac monitor, telemetry      Chronic diastolic heart failure  --seemingly stable  --Last ECHO from JUNE 2023 confirm EF 50 %  --no signs of fluid overload on exam   --monitor electrolytes, UOP  --strict I&O and daily weights ordered    Hs of Seizures  --controlled, hasn't had episode in years  --continue home regimen  --seizure precautions      Coronary artery disease of native artery of native heart with stable angina pectoris  --stable, no active chest pain at this time, however, since ACS can sometimes present as nausea and vomiting   --will order troponin x2 to screen  --continue home statin and antiplatelet therapy  --monitor clinically, PRN EKG    DM (diabetes mellitus) type II controlled with renal manifestation  Lab Results   Component Value Date    HGBA1C 5.1 10/26/2022   --controlled  --Home medications include metformin- held upon admission  --low dose SSI ordered  --Accuchecks ACQHS  --Fasting AM glucose goal <140      HTN (hypertension)  Blood pressure low-normal range, likely d/t volume depletion caused by intractable nausea and vomiting  --home medications include: norvasc, coreg, cardizem, valsartan, and imdur  --hold valsartan for HARESH  --hold imdur given low-normal blood pressure on admission  --restart norvasc, cardizem, and coreg  --PRN IV hydralazine 10mg Q6H for sBP > 175 mmHg for breakthrough  --Q4HVS    GERD (gastroesophageal reflux disease)  --reflux and/or gastric ulcer on ddx for presenting symptoms  --will give a 1x dose of IV PPI 80 mg   --restart home PPI but at twice a day  --monitor clinically        VTE Risk Mitigation (From admission, onward)         Ordered     enoxaparin injection 40 mg  Daily         08/13/23 1315     IP VTE HIGH RISK PATIENT  Once         08/13/23 1315     Place sequential compression device  Until discontinued         08/13/23 1315                   On 08/13/2023, patient  should be placed in hospital observation services under my care.        Evearrdo Graf MD  Department of Hospital Medicine  [facility] - Telemetry ([facility])

## 2023-08-13 NOTE — ASSESSMENT & PLAN NOTE
Blood pressure low-normal range, likely d/t volume depletion caused by intractable nausea and vomiting  --home medications include: norvasc, coreg, cardizem, valsartan, and imdur  --hold valsartan for HARESH  --hold imdur given low-normal blood pressure on admission  --restart norvasc, cardizem, and coreg  --PRN IV hydralazine 10mg Q6H for sBP > 175 mmHg for breakthrough  --Q4HVS

## 2023-08-13 NOTE — ASSESSMENT & PLAN NOTE
--stable, no active chest pain at this time, however, since ACS can sometimes present as nausea and vomiting   --will order troponin x2 to screen  --continue home statin and antiplatelet therapy  --monitor clinically, PRN EKG

## 2023-08-13 NOTE — ASSESSMENT & PLAN NOTE
--odd presentation, no obvious risk factors  --stool studies ordered, of which includes c. Diff- follow up results  --gentle IVFs for hydration

## 2023-08-14 LAB
ANION GAP SERPL CALC-SCNC: 12 MMOL/L (ref 8–16)
BACTERIA UR CULT: NORMAL
BACTERIA UR CULT: NORMAL
BASOPHILS # BLD AUTO: 0.02 K/UL (ref 0–0.2)
BASOPHILS NFR BLD: 0.4 % (ref 0–1.9)
BUN SERPL-MCNC: 19 MG/DL (ref 8–23)
C DIFF GDH STL QL: NEGATIVE
C DIFF TOX A+B STL QL IA: NEGATIVE
CALCIUM SERPL-MCNC: 7.2 MG/DL (ref 8.7–10.5)
CHLORIDE SERPL-SCNC: 109 MMOL/L (ref 95–110)
CHOLEST SERPL-MCNC: 116 MG/DL (ref 120–199)
CHOLEST/HDLC SERPL: 2.5 {RATIO} (ref 2–5)
CO2 SERPL-SCNC: 12 MMOL/L (ref 23–29)
CREAT SERPL-MCNC: 0.8 MG/DL (ref 0.5–1.4)
DIFFERENTIAL METHOD: ABNORMAL
EOSINOPHIL # BLD AUTO: 0 K/UL (ref 0–0.5)
EOSINOPHIL NFR BLD: 0.2 % (ref 0–8)
ERYTHROCYTE [DISTWIDTH] IN BLOOD BY AUTOMATED COUNT: 14.2 % (ref 11.5–14.5)
EST. GFR  (NO RACE VARIABLE): >60 ML/MIN/1.73 M^2
GLUCOSE SERPL-MCNC: 82 MG/DL (ref 70–110)
HCT VFR BLD AUTO: 30.5 % (ref 37–48.5)
HDLC SERPL-MCNC: 46 MG/DL (ref 40–75)
HDLC SERPL: 39.7 % (ref 20–50)
HGB BLD-MCNC: 10.1 G/DL (ref 12–16)
IMM GRANULOCYTES # BLD AUTO: 0.03 K/UL (ref 0–0.04)
IMM GRANULOCYTES NFR BLD AUTO: 0.6 % (ref 0–0.5)
LDLC SERPL CALC-MCNC: 49.4 MG/DL (ref 63–159)
LYMPHOCYTES # BLD AUTO: 0.4 K/UL (ref 1–4.8)
LYMPHOCYTES NFR BLD: 8.1 % (ref 18–48)
MAGNESIUM SERPL-MCNC: 1.9 MG/DL (ref 1.6–2.6)
MCH RBC QN AUTO: 30.4 PG (ref 27–31)
MCHC RBC AUTO-ENTMCNC: 33.1 G/DL (ref 32–36)
MCV RBC AUTO: 92 FL (ref 82–98)
MONOCYTES # BLD AUTO: 0.5 K/UL (ref 0.3–1)
MONOCYTES NFR BLD: 9.6 % (ref 4–15)
NEUTROPHILS # BLD AUTO: 4.4 K/UL (ref 1.8–7.7)
NEUTROPHILS NFR BLD: 81.1 % (ref 38–73)
NONHDLC SERPL-MCNC: 70 MG/DL
NRBC BLD-RTO: 0 /100 WBC
OB PNL STL: POSITIVE
PLATELET # BLD AUTO: 118 K/UL (ref 150–450)
PMV BLD AUTO: 9.9 FL (ref 9.2–12.9)
POCT GLUCOSE: 111 MG/DL (ref 70–110)
POCT GLUCOSE: 83 MG/DL (ref 70–110)
POCT GLUCOSE: 95 MG/DL (ref 70–110)
POTASSIUM SERPL-SCNC: 3.3 MMOL/L (ref 3.5–5.1)
PROCALCITONIN SERPL IA-MCNC: 0.18 NG/ML
RBC # BLD AUTO: 3.32 M/UL (ref 4–5.4)
RV AG STL QL IA.RAPID: NEGATIVE
SODIUM SERPL-SCNC: 133 MMOL/L (ref 136–145)
TRIGL SERPL-MCNC: 103 MG/DL (ref 30–150)
WBC # BLD AUTO: 5.44 K/UL (ref 3.9–12.7)
WBC #/AREA STL HPF: NORMAL /[HPF]

## 2023-08-14 PROCEDURE — 96376 TX/PRO/DX INJ SAME DRUG ADON: CPT

## 2023-08-14 PROCEDURE — 25000003 PHARM REV CODE 250: Performed by: FAMILY MEDICINE

## 2023-08-14 PROCEDURE — 97530 THERAPEUTIC ACTIVITIES: CPT

## 2023-08-14 PROCEDURE — 25000003 PHARM REV CODE 250: Performed by: STUDENT IN AN ORGANIZED HEALTH CARE EDUCATION/TRAINING PROGRAM

## 2023-08-14 PROCEDURE — 63600175 PHARM REV CODE 636 W HCPCS: Performed by: STUDENT IN AN ORGANIZED HEALTH CARE EDUCATION/TRAINING PROGRAM

## 2023-08-14 PROCEDURE — 87329 GIARDIA AG IA: CPT | Performed by: STUDENT IN AN ORGANIZED HEALTH CARE EDUCATION/TRAINING PROGRAM

## 2023-08-14 PROCEDURE — 87338 HPYLORI STOOL AG IA: CPT | Performed by: STUDENT IN AN ORGANIZED HEALTH CARE EDUCATION/TRAINING PROGRAM

## 2023-08-14 PROCEDURE — 96372 THER/PROPH/DIAG INJ SC/IM: CPT | Performed by: STUDENT IN AN ORGANIZED HEALTH CARE EDUCATION/TRAINING PROGRAM

## 2023-08-14 PROCEDURE — 83993 ASSAY FOR CALPROTECTIN FECAL: CPT | Performed by: STUDENT IN AN ORGANIZED HEALTH CARE EDUCATION/TRAINING PROGRAM

## 2023-08-14 PROCEDURE — 89055 LEUKOCYTE ASSESSMENT FECAL: CPT | Performed by: STUDENT IN AN ORGANIZED HEALTH CARE EDUCATION/TRAINING PROGRAM

## 2023-08-14 PROCEDURE — 87184 SC STD DISK METHOD PER PLATE: CPT | Performed by: STUDENT IN AN ORGANIZED HEALTH CARE EDUCATION/TRAINING PROGRAM

## 2023-08-14 PROCEDURE — 25000242 PHARM REV CODE 250 ALT 637 W/ HCPCS: Performed by: STUDENT IN AN ORGANIZED HEALTH CARE EDUCATION/TRAINING PROGRAM

## 2023-08-14 PROCEDURE — 87427 SHIGA-LIKE TOXIN AG IA: CPT | Mod: 59 | Performed by: STUDENT IN AN ORGANIZED HEALTH CARE EDUCATION/TRAINING PROGRAM

## 2023-08-14 PROCEDURE — 63600175 PHARM REV CODE 636 W HCPCS: Performed by: FAMILY MEDICINE

## 2023-08-14 PROCEDURE — 94640 AIRWAY INHALATION TREATMENT: CPT | Mod: XB

## 2023-08-14 PROCEDURE — 82653 EL-1 FECAL QUANTITATIVE: CPT | Performed by: STUDENT IN AN ORGANIZED HEALTH CARE EDUCATION/TRAINING PROGRAM

## 2023-08-14 PROCEDURE — 87045 FECES CULTURE AEROBIC BACT: CPT | Performed by: STUDENT IN AN ORGANIZED HEALTH CARE EDUCATION/TRAINING PROGRAM

## 2023-08-14 PROCEDURE — 87046 STOOL CULTR AEROBIC BACT EA: CPT | Performed by: STUDENT IN AN ORGANIZED HEALTH CARE EDUCATION/TRAINING PROGRAM

## 2023-08-14 PROCEDURE — 97166 OT EVAL MOD COMPLEX 45 MIN: CPT

## 2023-08-14 PROCEDURE — G0378 HOSPITAL OBSERVATION PER HR: HCPCS

## 2023-08-14 PROCEDURE — 25000003 PHARM REV CODE 250: Performed by: NURSE PRACTITIONER

## 2023-08-14 PROCEDURE — 96366 THER/PROPH/DIAG IV INF ADDON: CPT

## 2023-08-14 PROCEDURE — 82705 FATS/LIPIDS FECES QUAL: CPT | Performed by: STUDENT IN AN ORGANIZED HEALTH CARE EDUCATION/TRAINING PROGRAM

## 2023-08-14 PROCEDURE — 87449 NOS EACH ORGANISM AG IA: CPT | Performed by: STUDENT IN AN ORGANIZED HEALTH CARE EDUCATION/TRAINING PROGRAM

## 2023-08-14 PROCEDURE — 87425 ROTAVIRUS AG IA: CPT | Performed by: STUDENT IN AN ORGANIZED HEALTH CARE EDUCATION/TRAINING PROGRAM

## 2023-08-14 PROCEDURE — 84145 PROCALCITONIN (PCT): CPT | Performed by: STUDENT IN AN ORGANIZED HEALTH CARE EDUCATION/TRAINING PROGRAM

## 2023-08-14 PROCEDURE — 97161 PT EVAL LOW COMPLEX 20 MIN: CPT

## 2023-08-14 PROCEDURE — 80048 BASIC METABOLIC PNL TOTAL CA: CPT | Performed by: STUDENT IN AN ORGANIZED HEALTH CARE EDUCATION/TRAINING PROGRAM

## 2023-08-14 PROCEDURE — 94761 N-INVAS EAR/PLS OXIMETRY MLT: CPT

## 2023-08-14 PROCEDURE — 85025 COMPLETE CBC W/AUTO DIFF WBC: CPT | Performed by: STUDENT IN AN ORGANIZED HEALTH CARE EDUCATION/TRAINING PROGRAM

## 2023-08-14 PROCEDURE — 80061 LIPID PANEL: CPT | Performed by: STUDENT IN AN ORGANIZED HEALTH CARE EDUCATION/TRAINING PROGRAM

## 2023-08-14 PROCEDURE — 87209 SMEAR COMPLEX STAIN: CPT | Performed by: STUDENT IN AN ORGANIZED HEALTH CARE EDUCATION/TRAINING PROGRAM

## 2023-08-14 PROCEDURE — 96361 HYDRATE IV INFUSION ADD-ON: CPT

## 2023-08-14 PROCEDURE — 83735 ASSAY OF MAGNESIUM: CPT | Performed by: STUDENT IN AN ORGANIZED HEALTH CARE EDUCATION/TRAINING PROGRAM

## 2023-08-14 RX ORDER — POTASSIUM CHLORIDE 7.45 MG/ML
10 INJECTION INTRAVENOUS
Status: COMPLETED | OUTPATIENT
Start: 2023-08-14 | End: 2023-08-14

## 2023-08-14 RX ORDER — DORZOLAMIDE HYDROCHLORIDE AND TIMOLOL MALEATE 20; 5 MG/ML; MG/ML
1 SOLUTION/ DROPS OPHTHALMIC EVERY 12 HOURS
Status: DISCONTINUED | OUTPATIENT
Start: 2023-08-14 | End: 2023-08-16 | Stop reason: HOSPADM

## 2023-08-14 RX ORDER — SIMETHICONE 80 MG
1 TABLET,CHEWABLE ORAL
Status: DISCONTINUED | OUTPATIENT
Start: 2023-08-14 | End: 2023-08-16 | Stop reason: HOSPADM

## 2023-08-14 RX ORDER — DIPHENOXYLATE HYDROCHLORIDE AND ATROPINE SULFATE 2.5; .025 MG/1; MG/1
1 TABLET ORAL 4 TIMES DAILY
Status: COMPLETED | OUTPATIENT
Start: 2023-08-14 | End: 2023-08-14

## 2023-08-14 RX ADMIN — CEFTRIAXONE 1 G: 1 INJECTION, POWDER, FOR SOLUTION INTRAMUSCULAR; INTRAVENOUS at 01:08

## 2023-08-14 RX ADMIN — POTASSIUM CHLORIDE 10 MEQ: 7.46 INJECTION, SOLUTION INTRAVENOUS at 09:08

## 2023-08-14 RX ADMIN — ESLICARBAZEPINE ACETATE 400 MG: 200 TABLET ORAL at 08:08

## 2023-08-14 RX ADMIN — IPRATROPIUM BROMIDE AND ALBUTEROL SULFATE 3 ML: 2.5; .5 SOLUTION RESPIRATORY (INHALATION) at 07:08

## 2023-08-14 RX ADMIN — ARFORMOTEROL TARTRATE 15 MCG: 15 SOLUTION RESPIRATORY (INHALATION) at 07:08

## 2023-08-14 RX ADMIN — ONDANSETRON 8 MG: 2 INJECTION INTRAMUSCULAR; INTRAVENOUS at 09:08

## 2023-08-14 RX ADMIN — FLUTICASONE PROPIONATE 50 MCG: 50 SPRAY, METERED NASAL at 08:08

## 2023-08-14 RX ADMIN — ATORVASTATIN CALCIUM 40 MG: 40 TABLET, FILM COATED ORAL at 08:08

## 2023-08-14 RX ADMIN — GABAPENTIN 200 MG: 100 CAPSULE ORAL at 03:08

## 2023-08-14 RX ADMIN — POTASSIUM CHLORIDE 10 MEQ: 7.46 INJECTION, SOLUTION INTRAVENOUS at 11:08

## 2023-08-14 RX ADMIN — PANTOPRAZOLE SODIUM 40 MG: 40 TABLET, DELAYED RELEASE ORAL at 08:08

## 2023-08-14 RX ADMIN — IPRATROPIUM BROMIDE AND ALBUTEROL SULFATE 3 ML: 2.5; .5 SOLUTION RESPIRATORY (INHALATION) at 01:08

## 2023-08-14 RX ADMIN — LEVETIRACETAM 500 MG: 500 TABLET, FILM COATED ORAL at 09:08

## 2023-08-14 RX ADMIN — SODIUM CHLORIDE, POTASSIUM CHLORIDE, SODIUM LACTATE AND CALCIUM CHLORIDE: 600; 310; 30; 20 INJECTION, SOLUTION INTRAVENOUS at 04:08

## 2023-08-14 RX ADMIN — CARVEDILOL 25 MG: 12.5 TABLET, FILM COATED ORAL at 08:08

## 2023-08-14 RX ADMIN — BUDESONIDE 0.5 MG: 0.5 INHALANT ORAL at 07:08

## 2023-08-14 RX ADMIN — DORZOLAMIDE HYDROCHLORIDE AND TIMOLOL MALEATE 1 DROP: 22.3; 6.8 SOLUTION/ DROPS OPHTHALMIC at 09:08

## 2023-08-14 RX ADMIN — DILTIAZEM HYDROCHLORIDE 240 MG: 240 CAPSULE, COATED, EXTENDED RELEASE ORAL at 08:08

## 2023-08-14 RX ADMIN — ESCITALOPRAM OXALATE 20 MG: 10 TABLET, FILM COATED ORAL at 08:08

## 2023-08-14 RX ADMIN — CLOPIDOGREL BISULFATE 75 MG: 75 TABLET ORAL at 08:08

## 2023-08-14 RX ADMIN — HYDROCODONE BITARTRATE AND ACETAMINOPHEN 1 TABLET: 7.5; 325 TABLET ORAL at 05:08

## 2023-08-14 RX ADMIN — METRONIDAZOLE 500 MG: 500 INJECTION, SOLUTION INTRAVENOUS at 05:08

## 2023-08-14 RX ADMIN — HYDROCODONE BITARTRATE AND ACETAMINOPHEN 1 TABLET: 7.5; 325 TABLET ORAL at 04:08

## 2023-08-14 RX ADMIN — OXYBUTYNIN CHLORIDE 10 MG: 5 TABLET, EXTENDED RELEASE ORAL at 08:08

## 2023-08-14 RX ADMIN — POTASSIUM CHLORIDE 10 MEQ: 7.46 INJECTION, SOLUTION INTRAVENOUS at 01:08

## 2023-08-14 RX ADMIN — MELATONIN TAB 3 MG 6 MG: 3 TAB at 10:08

## 2023-08-14 RX ADMIN — GABAPENTIN 200 MG: 100 CAPSULE ORAL at 09:08

## 2023-08-14 RX ADMIN — PANTOPRAZOLE SODIUM 40 MG: 40 TABLET, DELAYED RELEASE ORAL at 09:08

## 2023-08-14 RX ADMIN — DIPHENOXYLATE HYDROCHLORIDE AND ATROPINE SULFATE 1 TABLET: 2.5; .025 TABLET ORAL at 01:08

## 2023-08-14 RX ADMIN — AMLODIPINE BESYLATE 10 MG: 10 TABLET ORAL at 08:08

## 2023-08-14 RX ADMIN — POTASSIUM CHLORIDE 10 MEQ: 7.46 INJECTION, SOLUTION INTRAVENOUS at 08:08

## 2023-08-14 RX ADMIN — ROFLUMILAST 500 MCG: 500 TABLET ORAL at 08:08

## 2023-08-14 RX ADMIN — POTASSIUM CHLORIDE 40 MEQ: 1500 TABLET, EXTENDED RELEASE ORAL at 08:08

## 2023-08-14 RX ADMIN — GABAPENTIN 200 MG: 100 CAPSULE ORAL at 08:08

## 2023-08-14 RX ADMIN — DIPHENOXYLATE HYDROCHLORIDE AND ATROPINE SULFATE 1 TABLET: 2.5; .025 TABLET ORAL at 08:08

## 2023-08-14 RX ADMIN — LEVETIRACETAM 500 MG: 500 TABLET, FILM COATED ORAL at 08:08

## 2023-08-14 RX ADMIN — ENOXAPARIN SODIUM 40 MG: 40 INJECTION SUBCUTANEOUS at 05:08

## 2023-08-14 NOTE — SUBJECTIVE & OBJECTIVE
Interval History: See hospital course for today      Review of Systems   Constitutional:  Positive for activity change and fatigue. Negative for fever.   Respiratory:  Negative for shortness of breath.    Gastrointestinal:  Positive for abdominal pain and diarrhea. Negative for constipation, nausea and vomiting.   Genitourinary:  Positive for decreased urine volume (improved), dysuria, flank pain and urgency.   Musculoskeletal:         Falls   Neurological:  Positive for weakness. Negative for seizures.   Psychiatric/Behavioral:  Positive for dysphoric mood. Negative for agitation, behavioral problems, confusion and decreased concentration. The patient is nervous/anxious.      Objective:     Vital Signs (Most Recent):  Temp: 97.4 °F (36.3 °C) (08/14/23 1204)  Pulse: 78 (08/14/23 1312)  Resp: 18 (08/14/23 1312)  BP: (!) 105/51 (08/14/23 1204)  SpO2: 99 % (08/14/23 1312) Vital Signs (24h Range):  Temp:  [97.4 °F (36.3 °C)-98.7 °F (37.1 °C)] 97.4 °F (36.3 °C)  Pulse:  [] 78  Resp:  [15-18] 18  SpO2:  [98 %-100 %] 99 %  BP: (105-130)/(51-60) 105/51     Weight: 53 kg (116 lb 13.5 oz)  Body mass index is 20.06 kg/m².    Intake/Output Summary (Last 24 hours) at 8/14/2023 1439  Last data filed at 8/14/2023 1210  Gross per 24 hour   Intake 683.53 ml   Output --   Net 683.53 ml         Physical Exam  Vitals and nursing note reviewed.   Constitutional:       General: She is not in acute distress.     Appearance: She is ill-appearing. She is not toxic-appearing.   HENT:      Head: Normocephalic and atraumatic.   Cardiovascular:      Rate and Rhythm: Normal rate.   Pulmonary:      Effort: Pulmonary effort is normal. No respiratory distress.   Abdominal:      Palpations: Abdomen is soft.      Tenderness: There is abdominal tenderness. There is no right CVA tenderness or left CVA tenderness.   Musculoskeletal:      Right lower leg: No edema.      Left lower leg: No edema.   Skin:     General: Skin is warm.   Neurological:       Mental Status: She is alert and oriented to person, place, and time.      Motor: Weakness present.   Psychiatric:         Mood and Affect: Mood is anxious and depressed.             Significant Labs: All pertinent labs within the past 24 hours have been reviewed.  CBC:   Recent Labs   Lab 08/13/23  1115 08/14/23  0528   WBC 8.10 5.44   HGB 12.5 10.1*   HCT 37.2 30.5*   * 118*     CMP:   Recent Labs   Lab 08/13/23  1115 08/13/23  2228 08/14/23  0528   * 135* 133*   K 2.8* 3.4* 3.3*    107 109   CO2 14* 13* 12*   GLU 91 80 82   BUN 32* 22 19   CREATININE 1.5* 1.0 0.8   CALCIUM 8.1* 7.6* 7.2*   PROT 7.2  --   --    ALBUMIN 2.9*  --   --    BILITOT 0.5  --   --    ALKPHOS 56  --   --    AST 32  --   --    ALT 19  --   --    ANIONGAP 14 15 12     Urine Studies:   Recent Labs   Lab 08/13/23  0901   COLORU Yellow   APPEARANCEUA Hazy*   PHUR 6.0   SPECGRAV >=1.030*   PROTEINUA 3+*   GLUCUA Negative   KETONESU Trace*   BILIRUBINUA 1+*   OCCULTUA 3+*   NITRITE Positive*   UROBILINOGEN 1.0   LEUKOCYTESUR 2+*   RBCUA 30*   WBCUA >100*   BACTERIA Few*   SQUAMEPITHEL 2   HYALINECASTS 1       Significant Imaging: I have reviewed all pertinent imaging results/findings within the past 24 hours.  CT: I have reviewed all pertinent results/findings within the past 24 hours and my personal findings are:  abdomen pelvis, unremarkable other than ileus   Xray abdomen nonobstructive gas pattern

## 2023-08-14 NOTE — ASSESSMENT & PLAN NOTE
--2.8 on admission, likely 2/2 GI losses  --replaced IV & PO in the ER  --Magnesium low, s/p 3g IV replacement  --repeat in the AM, trend daily  --cardiac monitor, telemetry    Continue repletion efforts

## 2023-08-14 NOTE — ASSESSMENT & PLAN NOTE
Lab Results   Component Value Date    HGBA1C 5.1 10/26/2022   --controlled  --Home medications include metformin- held upon admission  --low dose SSI ordered  --Accuchecks [facility]  --Fasting AM glucose goal <140

## 2023-08-14 NOTE — PT/OT/SLP EVAL
Occupational Therapy   Evaluation    Name: Shireen Felix  MRN: [id number]  Admitting Diagnosis: HARESH (acute kidney injury)  Recent Surgery: * No surgery found *      Recommendations:     Discharge Recommendations: home health OT (with 24/7 spv)  Discharge Equipment Recommendations:  walker, rolling  Barriers to discharge:  Decreased caregiver support    Assessment:     Shireen Felix is a 77 y.o. female with a medical diagnosis of HARESH (acute kidney injury).  She presents with the following performance deficits affecting function: weakness, impaired endurance, impaired self care skills, impaired functional mobility, gait instability, impaired balance, decreased coordination, decreased upper extremity function, decreased lower extremity function, decreased safety awareness, pain, decreased ROM.      Rehab Prognosis: Good; patient would benefit from acute skilled OT services to address these deficits and reach maximum level of function.       Plan:     Patient to be seen 2 x/week to address the above listed problems via self-care/home management, therapeutic activities, therapeutic exercises  Plan of Care Expires: 08/28/23  Plan of Care Reviewed with: patient    Subjective     Chief Complaint: diarrhea  Patient/Family Comments/goals: use the bathroom    Occupational Profile:  Living Environment: lives alone in a 1 story house with ramp to enter. Pt reports her 2 daughters live nearby and she is able to stay with her brother, who can assist her, if needed. Pt has bath-tub with built-in seat. Pt reports multiple falls.  Previous level of function: Pt Mod (I) with functional mobility using SPC occasionally. Mod (I) - (I) with ADLs.  Roles and Routines: does not drive, brother provides transport  Equipment Used at Home: rollator, wheelchair, cane, straight, bath bench  Assistance upon Discharge: family    Pain/Comfort:  Pain Rating 1: 7/10  Location - Side 1: Bilateral  Location - Orientation 1:  generalized  Location 1: back  Pain Addressed 1: Reposition, Distraction  Pain Rating Post-Intervention 1: 7/10    Objective:     Communicated with: Nurse and epic chart review prior to session.  Patient found HOB elevated with telemetry, peripheral IV upon OT entry to room.    General Precautions: Standard, fall, seizure  Orthopedic Precautions: N/A  Braces: N/A  Respiratory Status: Room air    Occupational Performance:    Bed Mobility:    Patient completed Rolling/Turning to Right with supervision  Patient completed Scooting/Bridging with supervision  Patient completed Supine to Sit with supervision    Functional Mobility/Transfers:  Patient completed Sit <> Stand Transfer with contact guard assistance  with  rolling walker   Patient completed Bed <> Chair Transfer using Stand Pivot technique with contact guard assistance with rolling walker  Patient completed Toilet Transfer Stand Pivot technique with contact guard assistance with  hand-held assist and bedside commode  Functional Mobility: Patient completed x20ft functional mobility with CGA and RW to increase dynamic standing balance and activity tolerance needed for ADL completion. Pt unsteady during ambulation and with increased shakiness.    Activities of Daily Living:  Lower Body Dressing: independence dannie socks in chair  Toileting: minimum assistance at [facility].    Cognitive/Visual Perceptual:  Cognitive/Psychosocial Skills:     -       Oriented to: Person, Place, and Time   -       Follows Commands/attention:Follows two-step commands  -       Communication: clear/fluent  -       Safety awareness/insight to disability: impaired     Physical Exam:  Sensation:    -       Intact  Upper Extremity Range of Motion:     -       Right Upper Extremity: Deficits: limited AROM in shoulder d/t rotator cuff injury per pt, Elbow WFL  -       Left Upper Extremity: WFL  Upper Extremity Strength:    -       Right Upper Extremity: 3-/5 shoulder, 4/5 elbow  -       Left Upper  Extremity: 4-/5 shoulder, 4/5 elbow   Strength:    -       Right Upper Extremity: WFL  -       Left Upper Extremity: WFL    AMPAC 6 Click ADL:  AMPAC Total Score: 21    Treatment & Education:  Patient educated on role of OT in acute setting and benefits of participation. Educated on techniques to use to increase independence and decrease fall risk with functional transfers. Educated on importance of OOB activity and calling for A to transfer back to bed. Encouraged completion of B UE AROM therex throughout the day to tolerance to increase functional strength and activity tolerance. Educated patient on importance of increased tolerance to upright position and direct impact on CV endurance and strength. Patient encouraged to sit up in chair for a minimum of 2 consecutive hours per day.  Patient stated understanding and in agreement with POC.     Patient left up in chair with all lines intact, call button in reach, chair alarm on, and nurse present    GOALS:   Multidisciplinary Problems       Occupational Therapy Goals          Problem: Occupational Therapy    Goal Priority Disciplines Outcome Interventions   Occupational Therapy Goal     OT, PT/OT     Description: Goals to be met by: 8/28/23     Patient will increase functional independence with ADLs by performing:    Grooming while standing at sink with Modified [place].  Toileting from toilet with Set-up Assistance for hygiene and clothing management.   Toilet transfer to toilet with Modified [place] with RW.  Upper extremity exercise program x15 reps per handout, with independence.                         History:     Past Medical History:   Diagnosis Date    Abnormal ECG 8/5/2019    Aneurysm     Anticoagulant long-term use     Arthritis     CAD in native artery 8/5/2019    COPD (chronic obstructive pulmonary disease)     Diabetes mellitus     Fall 10/10/2019    Formatting of this note might be different from the original. Found sitting on floor next  to bed last night Mild confusion today Does not recall falling or how she ended up on floor UA today Labs yesterday unremarkable    Glaucoma     Hypertension     Seizures     Shingles 05/27/2017    Stroke          Past Surgical History:   Procedure Laterality Date    BRAIN SURGERY      CARDIAC CATHETERIZATION      CORONARY ANGIOPLASTY      EPIDURAL STEROID INJECTION INTO LUMBAR SPINE Bilateral 11/12/2019    Procedure: TF XANDER L4/5;  Surgeon: Desean Dias MD;  Location: [facility]IN [facility]GT;  Service: Pain Management;  Laterality: Bilateral;    HYSTERECTOMY      INJECTION OF ANESTHETIC AGENT AROUND MEDIAL BRANCH NERVES INNERVATING LUMBAR FACET JOINT Bilateral 1/31/2020    Procedure: Bilateral L3-5 MBB;  Surgeon: Desean Dias MD;  Location: [facility];  Service: Pain Management;  Laterality: Bilateral;    INJECTION OF ANESTHETIC AGENT INTO SACROILIAC JOINT Right 11/16/2021    Procedure: Right BLOCK, SACROILIAC JOINT Right GTB with RN IV sedation;  Surgeon: Yao Fulton MD;  Location: [facility];  Service: Pain Management;  Laterality: Right;    INJECTION OF ANESTHETIC AGENT INTO SACROILIAC JOINT Bilateral 7/28/2023    Procedure: Bilateral GT bursa + bilateral SIJ injection;  Surgeon: Francisco Oshea MD;  Location: [facility];  Service: Pain Management;  Laterality: Bilateral;    INJECTION OF JOINT Bilateral 7/9/2020    Procedure: Bilateral shoulder GH Joint injection with local;  Surgeon: Desean Dias MD;  Location: [facility];  Service: Pain Management;  Laterality: Bilateral;    INJECTION OF JOINT Bilateral 7/28/2023    Procedure: Bilateral GT bursa + bilateral SIJ injection NEEDS ADDITIONAL SEDATION AND MORE TIME BEFORE INJECTION RN IV Sedation;  Surgeon: Francisco Oshea MD;  Location: [facility];  Service: Pain Management;  Laterality: Bilateral;    SELECTIVE INJECTION OF ANESTHETIC AGENT AROUND LUMBAR SPINAL NERVE ROOT BY TRANSFORAMINAL APPROACH Bilateral 4/26/2023    Procedure: Bilateral L4/5 TF  XANDER RN IV Sedation;  Surgeon: Francisco Oshea MD;  Location: [facility]IN [facility]GT;  Service: Pain Management;  Laterality: Bilateral;    TRANSFORAMINAL EPIDURAL INJECTION OF STEROID Bilateral 7/23/2019    Procedure: Bilateral L3/4 Transforaminal Epidural Steroid Injection;  Surgeon: Desean Dias MD;  Location: [facility]IN [facility];  Service: Pain Management;  Laterality: Bilateral;    TRANSFORAMINAL EPIDURAL INJECTION OF STEROID Bilateral 3/10/2020    Procedure: Right T12/L1 TF XANDER with local;  Surgeon: Desean Dias MD;  Location: [facility]IN [facility];  Service: Pain Management;  Laterality: Bilateral;    TRANSFORAMINAL EPIDURAL INJECTION OF STEROID Right 6/19/2020    Procedure: Right T12/L1 TFESI Covid day of procedure;  Surgeon: Desean Dias MD;  Location: [facility]IN [facility];  Service: Pain Management;  Laterality: Right;       Time Tracking:     OT Date of Treatment: 08/14/23  OT Start Time: 1230  OT Stop Time: 1300  OT Total Time (min): 30 min    Billable Minutes:Evaluation 15  Therapeutic Activity 15    8/14/2023  Keesha Grimm OT

## 2023-08-14 NOTE — ASSESSMENT & PLAN NOTE
Cr bumped from baseline, GFR moderately decreased from previous lab draws. Suspect prerenal given history and presentation  --will hold home losartan given nephrotoxic profile  --avoid nephrotoxic agents while hospitalized  --renally dose all other home meds (Gabapentin, Ranexa)  --daily BMP to monitor renal fxn   --has hx of bilateral renal cysts, consider Retroperitoneal US if renal function fails to improve  --gentle IVFs     Resolved

## 2023-08-14 NOTE — PT/OT/SLP EVAL
"Physical Therapy Evaluation    Patient Name:  Shireen Felix   MRN:  [id number]    Recommendations:     Discharge Recommendations: home health PT (with 24/7 SPV and A)   Discharge Equipment Recommendations: walker, rolling   Barriers to discharge: None    Assessment:     Shireen Felix is a 77 y.o. female admitted with a medical diagnosis of HARESH (acute kidney injury).  She presents with the following impairments/functional limitations: weakness, impaired endurance, impaired functional mobility, gait instability, impaired balance, pain, decreased safety awareness, decreased lower extremity function, decreased coordination.    Rehab Prognosis: Good; patient would benefit from acute skilled PT services to address these deficits and reach maximum level of function.    Recent Surgery: * No surgery found *      Plan:     During this hospitalization, patient to be seen 3 x/week to address the identified rehab impairments via gait training, therapeutic activities, therapeutic exercises and progress toward the following goals:    Plan of Care Expires:  08/28/23    Subjective     Chief Complaint: Pt reports frequent diarrhea   Patient/Family Comments/goals: none stated  Pain/Comfort:  Pain Rating 1: 7/10  Location - Side 1: Bilateral  Location - Orientation 1: generalized  Location 1: back  Pain Addressed 1: Reposition, Distraction  Pain Rating Post-Intervention 1: 7/10    Patients cultural, spiritual, Adventism conflicts given the current situation:  no    Living Environment:  Pt reports living alone in a 1 story home, ramp to enter. Daughters live close by, is able to live with brother if needed. Reports multiple falls this year, 1 in Feb, 2 in March, 1 in July, reports feeling "icky" since last fall.  Prior to admission, patients level of function was MOD I bathing, dressing, ADLs, household and community ambulation with intermittent use of SPC, not driving, retired.  Equipment used at home: rollator, wheelchair, " "grab bar (built in shower bench).  DME owned (not currently used): shower chair.  Upon discharge, patient will have assistance from FAMILY.    Objective:     Communicated with nurse Hall and [facility] chart review prior to session.  Patient found supine with peripheral IV, telemetry  upon PT entry to room.    General Precautions: Standard, fall  Orthopedic Precautions:N/A   Braces: N/A  Respiratory Status: Room air    Exams:  Cognitive Exam:  Patient is oriented to Person, Place, Time, and Situation  Sensation:    -       Impaired  B UE and LE  Skin Integrity/Edema:      -       Skin integrity: Visible skin intact  RLE ROM: WFL  RLE Strength: Grossly 4/5 except hip 3+/5  LLE ROM: WFL  LLE Strength: Grossly 4/5 except hip 3+/5    Functional Mobility:  Bed Mobility:     Rolling Right: supervision  Scooting: supervision  Supine to Sit: supervision  Transfers:     Sit to Stand:  contact guard assistance with rolling walker  Bed to Chair: contact guard assistance with  rolling walker  using  Step Transfer  Toilet Transfer: contact guard assistance with  rolling walker  using  Stand Pivot  Gait: ambulated 20ft CGA using RW, mild unsteadiness on feet, no gross LOB, no c/o dizziness or SOB  Balance: Demonstrated good sitting balance, fair dynamic balance during gait    AM-PAC 6 CLICK MOBILITY  Total Score:18       Treatment & Education:  Pt educated on role of PT in acute care and POC. Educated on importance of OOB activities, activity pacing, and HEP (marching/hip flex, hip abd, heel slides/LAQ, quad sets, ankle pumps) in order to maintain/regain strength. Encouraged to sit up in chair for all meals. Educated on proper use of RW for safety and to reduce risk of falling. Educated on "call don't fall" policy and increased risk of falling due to weakness, instructed to utilize call bell for assistance with all transfers. Pt agreeable to all requests.    Patient left up in chair with all lines intact, call button in reach, chair " alarm on, and nurse present.    GOALS:   Multidisciplinary Problems       Physical Therapy Goals          Problem: Physical Therapy    Goal Priority Disciplines Outcome Goal Variances Interventions   Physical Therapy Goal     PT, PT/OT      Description: Goals to be met by 8/28/23.  1. Pt will complete bed mobility MOD I.  2. Pt will complete sit to stand MOD I.  3. Pt will ambulate 200ft MOD I using RW.  4. Pt will increase AMPAC score by 2 points to progress functional mobility.                         History:     Past Medical History:   Diagnosis Date    Abnormal ECG 8/5/2019    Aneurysm     Anticoagulant long-term use     Arthritis     CAD in native artery 8/5/2019    COPD (chronic obstructive pulmonary disease)     Diabetes mellitus     Fall 10/10/2019    Formatting of this note might be different from the original. Found sitting on floor next to bed last night Mild confusion today Does not recall falling or how she ended up on floor UA today Labs yesterday unremarkable    Glaucoma     Hypertension     Seizures     Shingles 05/27/2017    Stroke        Past Surgical History:   Procedure Laterality Date    BRAIN SURGERY      CARDIAC CATHETERIZATION      CORONARY ANGIOPLASTY      EPIDURAL STEROID INJECTION INTO LUMBAR SPINE Bilateral 11/12/2019    Procedure: TF XANDER L4/5;  Surgeon: Desean Dias MD;  Location: [facility];  Service: Pain Management;  Laterality: Bilateral;    HYSTERECTOMY      INJECTION OF ANESTHETIC AGENT AROUND MEDIAL BRANCH NERVES INNERVATING LUMBAR FACET JOINT Bilateral 1/31/2020    Procedure: Bilateral L3-5 MBB;  Surgeon: Desean Dias MD;  Location: [facility];  Service: Pain Management;  Laterality: Bilateral;    INJECTION OF ANESTHETIC AGENT INTO SACROILIAC JOINT Right 11/16/2021    Procedure: Right BLOCK, SACROILIAC JOINT Right GTB with RN IV sedation;  Surgeon: Yao Fulton MD;  Location: [facility];  Service: Pain Management;  Laterality: Right;    INJECTION OF ANESTHETIC  AGENT INTO SACROILIAC JOINT Bilateral 7/28/2023    Procedure: Bilateral GT bursa + bilateral SIJ injection;  Surgeon: Francisco Oshea MD;  Location: [facility];  Service: Pain Management;  Laterality: Bilateral;    INJECTION OF JOINT Bilateral 7/9/2020    Procedure: Bilateral shoulder GH Joint injection with local;  Surgeon: Desean iDas MD;  Location: [facility];  Service: Pain Management;  Laterality: Bilateral;    INJECTION OF JOINT Bilateral 7/28/2023    Procedure: Bilateral GT bursa + bilateral SIJ injection NEEDS ADDITIONAL SEDATION AND MORE TIME BEFORE INJECTION RN IV Sedation;  Surgeon: Francisco Oshea MD;  Location: [facility];  Service: Pain Management;  Laterality: Bilateral;    SELECTIVE INJECTION OF ANESTHETIC AGENT AROUND LUMBAR SPINAL NERVE ROOT BY TRANSFORAMINAL APPROACH Bilateral 4/26/2023    Procedure: Bilateral L4/5 TF XANDER RN IV Sedation;  Surgeon: Francisco Oshea MD;  [facility];  Service: Pain Management;  Laterality: Bilateral;    TRANSFORAMINAL EPIDURAL INJECTION OF STEROID Bilateral 7/23/2019    Procedure: Bilateral L3/4 Transforaminal Epidural Steroid Injection;  Surgeon: Desean Dias MD;  [facility];  Service: Pain Management;  Laterality: Bilateral;    TRANSFORAMINAL EPIDURAL INJECTION OF STEROID Bilateral 3/10/2020    Procedure: Right T12/L1 TF XANDER with local;  Surgeon: Desean Dias MD;  [facility];  Service: Pain Management;  Laterality: Bilateral;    TRANSFORAMINAL EPIDURAL INJECTION OF STEROID Right 6/19/2020    Procedure: Right T12/L1 TFESI Covid day of procedure;  Surgeon: Desean Dias MD;  [facility];  Service: Pain Management;  Laterality: Right;       Time Tracking:     PT Received On: 08/14/23  PT Start Time: 1230     PT Stop Time: 1300  PT Total Time (min): 30 min     Billable Minutes: Evaluation 15min and Therapeutic Activity 15min    08/14/2023

## 2023-08-14 NOTE — ASSESSMENT & PLAN NOTE
Blood pressure low-normal range, likely d/t volume depletion caused by intractable nausea and vomiting  --home medications include: norvasc, coreg, cardizem, valsartan, and imdur  --hold valsartan for HARESH  --hold imdur given low-normal blood pressure on admission  --restart norvasc, cardizem, and coreg  --PRN IV hydralazine 10mg Q6H for sBP > 175 mmHg for breakthrough  --[id number]    Currently hypotensive  Will de-escalate antihtn medication(s)

## 2023-08-14 NOTE — PLAN OF CARE
Problem: Adult Inpatient Plan of Care  Goal: Plan of Care Review  Outcome: Ongoing, Progressing  Goal: Absence of Hospital-Acquired Illness or Injury  Outcome: Ongoing, Progressing     Problem: Fluid and Electrolyte Imbalance (Acute Kidney Injury/Impairment)  Goal: Fluid and Electrolyte Balance  Outcome: Ongoing, Progressing     Problem: Renal Function Impairment (Acute Kidney Injury/Impairment)  Goal: Effective Renal Function  Outcome: Ongoing, Progressing     Problem: Infection  Goal: Absence of Infection Signs and Symptoms  Outcome: Ongoing, Progressing     Problem: Skin Injury Risk Increased  Goal: Skin Health and Integrity  Outcome: Ongoing, Progressing

## 2023-08-14 NOTE — HOSPITAL COURSE
8/14 admitted for acute kidney injury, which has resolved. Urinalysis positive. Receiving intravenous antibiotic(s). Complaining of diarrhea and dysuria. Cdiff negative. Denies recurrent utis. Follows urology outpatient for incontinence. Cva tenderness but imaging unremarkable  8/15 continues to complains of diarrhea and concerns of worms. Stool ova and parasite pending. Urine culture unremarkable. Discontinue rocephin. Physical/occupational therapy recommending homehealth physical/occupational therapy.   8/16 hb/hct downtrending. Diarrhea improving. Cta GI bleed pending. Pmh polyps on cscope approximately 5 years ago and failed to follow up. Consider gastroenterology consult pending imaging    Cta GI bleed with no acute bleeding but concerns for diverticulitis. Received 2 days of rocephin. Will send additional course of flagyl and cefdinir.    Stool growing salmonella. Received rocephin. Continue 3rd gen cephalosporin po.    Patient seen and evaluated by me. Patient was determined to be suitable for discharge. Patient deemed stable for discharge to home with homehealth physical/occupational therapy and nurse practitioner to visit home program.

## 2023-08-14 NOTE — PLAN OF CARE
PT EVAL complete. Required SPV for bed mobility, ambulated 20ft CGA using RW. Recommending HHPt with 24/7 SPV and A upon d/c.

## 2023-08-14 NOTE — PLAN OF CARE
OT zain completed. Recommends HHOT with 24/7 spv.  SPV for bed mobility. CGA for t/fs and ambulation 20ft with RW.

## 2023-08-14 NOTE — CARE UPDATE
Notified by nurse of back/flank pain. Pt seen and examined. She is in the hospital for HARESH and UTI. UA with 30 RBCs. +Right CVA TTP with guarding. Will check CT renal stone protocol. Norco ordered for pain.

## 2023-08-14 NOTE — PROGRESS NOTES
[facility] Medicine  Progress Note    Patient Name: Shireen Felix  MRN: [id number]  Patient Class: OP- Observation   Admission Date: 8/13/2023  Length of Stay: 0 days  Attending Physician: Sandra Geller MD  Primary Care Provider: Laron Chaudhari MD        Subjective:     Principal Problem:HARESH (acute kidney injury)        HPI:  Patient is a 77-year-old woman with a past medical history of COPD, non-insulin-dependent type 2 diabetes mellitus, CAD, essential hypertension with dyslipidemia, history of stroke, GERD, and glaucoma who presented to the ER with intractable nausea with NBNB vomiting.  Onset of symptoms 3-4 days prior to admission, associated with epigastric abdominal pain and frequent nonbloody diarrhea (3-4 episodes a day).  Patient reports symptoms were mild at 1st but soon progressed to the point where she was unable to tolerate any food and was becoming weaker and weaker.  This is what prompted her to come into the ER for evaluation.  No prior history of similar symptoms, no new medication changes, no known sick contacts, no changes in diet, no fevers, chills, chest pain, shortness of breath or skin rashes.    In the ER, patient's vital signs were grossly within normal limits and she remained hemodynamically stable since arrival.  Routine labs showed several abnormalities, including a abnormal urinalysis suggestive of UTI, a mildly elevated procalcitonin of 0.3, hypomagnesemia of 1.2, acute kidney injury with Cr of 1.5  (baseline approximately 0.6), hypokalemia of 2.8, CO2 of 14 and a hypoalbuminemia of 2.9.  Pertinent negatives include a normal lactic acid was 0.7, normal lipase, normal WBC.  No imaging obtained in the ER for review.  Hospital Medicine called for admission.      Overview/Hospital Course:  8/14 admitted for acute kidney injury, which has resolved. Urinalysis positive. Receiving intravenous antibiotic(s). Complaining of diarrhea and dysuria. Cdiff  negative. Denies recurrent utis. Follows urology outpatient for incontinence. Cva tenderness but imaging unremarkable      Interval History: See hospital course for today      Review of Systems   Constitutional:  Positive for activity change and fatigue. Negative for fever.   Respiratory:  Negative for shortness of breath.    Gastrointestinal:  Positive for abdominal pain and diarrhea. Negative for constipation, nausea and vomiting.   Genitourinary:  Positive for decreased urine volume (improved), dysuria, flank pain and urgency.   Musculoskeletal:         Falls   Neurological:  Positive for weakness. Negative for seizures.   Psychiatric/Behavioral:  Positive for dysphoric mood. Negative for agitation, behavioral problems, confusion and decreased concentration. The patient is nervous/anxious.      Objective:     Vital Signs (Most Recent):  Temp: 97.4 °F (36.3 °C) (08/14/23 1204)  Pulse: 78 (08/14/23 1312)  Resp: 18 (08/14/23 1312)  BP: (!) 105/51 (08/14/23 1204)  SpO2: 99 % (08/14/23 1312) Vital Signs (24h Range):  Temp:  [97.4 °F (36.3 °C)-98.7 °F (37.1 °C)] 97.4 °F (36.3 °C)  Pulse:  [] 78  Resp:  [15-18] 18  SpO2:  [98 %-100 %] 99 %  BP: (105-130)/(51-60) 105/51     Weight: 53 kg (116 lb 13.5 oz)  Body mass index is 20.06 kg/m².    Intake/Output Summary (Last 24 hours) at 8/14/2023 1439  Last data filed at 8/14/2023 1210  Gross per 24 hour   Intake 683.53 ml   Output --   Net 683.53 ml         Physical Exam  Vitals and nursing note reviewed.   Constitutional:       General: She is not in acute distress.     Appearance: She is ill-appearing. She is not toxic-appearing.   HENT:      Head: Normocephalic and atraumatic.   Cardiovascular:      Rate and Rhythm: Normal rate.   Pulmonary:      Effort: Pulmonary effort is normal. No respiratory distress.   Abdominal:      Palpations: Abdomen is soft.      Tenderness: There is abdominal tenderness. There is no right CVA tenderness or left CVA tenderness.    Musculoskeletal:      Right lower leg: No edema.      Left lower leg: No edema.   Skin:     General: Skin is warm.   Neurological:      Mental Status: She is alert and oriented to person, place, and time.      Motor: Weakness present.   Psychiatric:         Mood and Affect: Mood is anxious and depressed.             Significant Labs: All pertinent labs within the past 24 hours have been reviewed.  CBC:   Recent Labs   Lab 08/13/23  1115 08/14/23  0528   WBC 8.10 5.44   HGB 12.5 10.1*   HCT 37.2 30.5*   * 118*     CMP:   Recent Labs   Lab 08/13/23  1115 08/13/23  2228 08/14/23  0528   * 135* 133*   K 2.8* 3.4* 3.3*    107 109   CO2 14* 13* 12*   GLU 91 80 82   BUN 32* 22 19   CREATININE 1.5* 1.0 0.8   CALCIUM 8.1* 7.6* 7.2*   PROT 7.2  --   --    ALBUMIN 2.9*  --   --    BILITOT 0.5  --   --    ALKPHOS 56  --   --    AST 32  --   --    ALT 19  --   --    ANIONGAP 14 15 12     Urine Studies:   Recent Labs   Lab 08/13/23  0901   COLORU Yellow   APPEARANCEUA Hazy*   PHUR 6.0   SPECGRAV >=1.030*   PROTEINUA 3+*   GLUCUA Negative   KETONESU Trace*   BILIRUBINUA 1+*   OCCULTUA 3+*   NITRITE Positive*   UROBILINOGEN 1.0   LEUKOCYTESUR 2+*   RBCUA 30*   WBCUA >100*   BACTERIA Few*   SQUAMEPITHEL 2   HYALINECASTS 1       Significant Imaging: I have reviewed all pertinent imaging results/findings within the past 24 hours.  CT: I have reviewed all pertinent results/findings within the past 24 hours and my personal findings are:  abdomen pelvis, unremarkable other than ileus   Xray abdomen nonobstructive gas pattern      Assessment/Plan:      * HARESH (acute kidney injury)  Cr bumped from baseline, GFR moderately decreased from previous lab draws. Suspect prerenal given history and presentation  --will hold home losartan given nephrotoxic profile  --avoid nephrotoxic agents while hospitalized  --renally dose all other home meds (Gabapentin, Ranexa)  --daily BMP to monitor renal fxn   --has hx of bilateral renal  cysts, consider Retroperitoneal US if renal function fails to improve  --gentle IVFs     Resolved     Acute diarrhea  --odd presentation, no obvious risk factors  --stool studies ordered, of which includes c. Diff- follow up results  --gentle IVFs for hydration    cdiff negative  Lomotil     Intractable vomiting with nausea  Etiology unknown. Patient could not recall any risk factors at the time of our encounter. Ddx will default to the most common etiologis: Gastritis, ulcer, reflux, esophagitis, pancreatitis, SBO/Ileus (low suspicion)   --more controlled at the time of our encounter  --maintain on PRN IV therapy for now, modify as necessary  --Lipase and LFTs are normal  --abdominal XR ordered/pending  --procalcitonin mildly elevated- will add on Flagyl to Rocephin regimen to cover for intraabdominal infxn  --IV PPI 80 mg x1 dose, restart home PPI regimen thereafter  --monitor clinically  --gentle IVFs, CLD, advance slowly  --if persists, consider abdominal CT to further assess    Acute cystitis without hematuria  --urinalysis results suggestive of infection  --no prior culture data available for review  --s/p IV Rocephin in the ER- continue upon admission  --follow urine and blood cultures  --monitor fever curve- Tylenol PRN    Urine culture pending    Hypokalemia  --2.8 on admission, likely 2/2 GI losses  --replaced IV & PO in the ER  --Magnesium low, s/p 3g IV replacement  --repeat in the AM, trend daily  --cardiac monitor, telemetry    Continue repletion efforts    Chronic diastolic heart failure  --seemingly stable  --Last ECHO from JUNE 2023 confirm EF 50 %  --no signs of fluid overload on exam   --monitor electrolytes, UOP  --strict I&O and daily weights ordered        Seizure  --controlled, hasn't had episode in years  --continue home regimen  --seizure precautions      Coronary artery disease of native artery of native heart with stable angina pectoris  --stable, no active chest pain at this time,  however, since ACS can sometimes present as nausea and vomiting   --will order troponin x2 to screen  --continue home statin and antiplatelet therapy  --monitor clinically, PRN EKG    DM (diabetes mellitus) type II controlled with renal manifestation  Lab Results   Component Value Date    HGBA1C 5.1 10/26/2022   --controlled  --Home medications include metformin- held upon admission  --low dose SSI ordered  --Accuchecks ACQHS  --Fasting AM glucose goal <140      HTN (hypertension)  Blood pressure low-normal range, likely d/t volume depletion caused by intractable nausea and vomiting  --home medications include: norvasc, coreg, cardizem, valsartan, and imdur  --hold valsartan for HARESH  --hold imdur given low-normal blood pressure on admission  --restart norvasc, cardizem, and coreg  --PRN IV hydralazine 10mg Q6H for sBP > 175 mmHg for breakthrough  --Q4HVS    Currently hypotensive  Will de-escalate antihtn medication(s)     GERD (gastroesophageal reflux disease)  --reflux and/or gastric ulcer on ddx for presenting symptoms  --will give a 1x dose of IV PPI 80 mg   --restart home PPI but at twice a day  --monitor clinically        VTE Risk Mitigation (From admission, onward)         Ordered     enoxaparin injection 40 mg  Daily         08/13/23 1315     IP VTE HIGH RISK PATIENT  Once         08/13/23 1315     Place sequential compression device  Until discontinued         08/13/23 1315                Discharge Planning   KATHERINE:      Code Status: Full Code   Is the patient medically ready for discharge?:     Reason for patient still in hospital (select all that apply): Patient trending condition, Laboratory test, Treatment, Consult recommendations, PT / OT recommendations and Pending disposition                     Sandra Geller MD  Department of Hospital Medicine   [place] - Telemetry ([facility])

## 2023-08-14 NOTE — PLAN OF CARE
Problem: Adult Inpatient Plan of Care  Goal: Patient-Specific Goal (Individualized)  Outcome: Ongoing, Progressing     Pt AAO   SR on tele   Blood pressure stable   Room air   C Diff negative   IV Rocephin for uti continued   K riders administered   Labs in AM   Lovenox for VTE  BG AC and HS   PT/OT  Fall precautions in place   24 hour chart check completed

## 2023-08-14 NOTE — ASSESSMENT & PLAN NOTE
--odd presentation, no obvious risk factors  --stool studies ordered, of which includes c. Diff- follow up results  --gentle IVFs for hydration    cdiff negative  Lomotil

## 2023-08-14 NOTE — ASSESSMENT & PLAN NOTE
--urinalysis results suggestive of infection  --no prior culture data available for review  --s/p IV Rocephin in the ER- continue upon admission  --follow urine and blood cultures  --monitor fever curve- Tylenol PRN    Urine culture pending

## 2023-08-15 PROBLEM — N17.9 AKI (ACUTE KIDNEY INJURY): Status: RESOLVED | Noted: 2023-08-13 | Resolved: 2023-08-15

## 2023-08-15 PROBLEM — E87.6 HYPOKALEMIA: Status: RESOLVED | Noted: 2023-06-03 | Resolved: 2023-08-15

## 2023-08-15 PROBLEM — N95.2 VAGINAL ATROPHY: Status: ACTIVE | Noted: 2023-08-15

## 2023-08-15 LAB
ANION GAP SERPL CALC-SCNC: 12 MMOL/L (ref 8–16)
BASOPHILS # BLD AUTO: 0.02 K/UL (ref 0–0.2)
BASOPHILS NFR BLD: 0.3 % (ref 0–1.9)
BUN SERPL-MCNC: 13 MG/DL (ref 8–23)
CALCIUM SERPL-MCNC: 7.7 MG/DL (ref 8.7–10.5)
CHLORIDE SERPL-SCNC: 108 MMOL/L (ref 95–110)
CO2 SERPL-SCNC: 12 MMOL/L (ref 23–29)
CREAT SERPL-MCNC: 0.9 MG/DL (ref 0.5–1.4)
CRYPTOSP AG STL QL IA: NEGATIVE
DIFFERENTIAL METHOD: ABNORMAL
EOSINOPHIL # BLD AUTO: 0 K/UL (ref 0–0.5)
EOSINOPHIL NFR BLD: 0.3 % (ref 0–8)
ERYTHROCYTE [DISTWIDTH] IN BLOOD BY AUTOMATED COUNT: 14.5 % (ref 11.5–14.5)
EST. GFR  (NO RACE VARIABLE): >60 ML/MIN/1.73 M^2
G LAMBLIA AG STL QL IA: NEGATIVE
GLUCOSE SERPL-MCNC: 78 MG/DL (ref 70–110)
HCT VFR BLD AUTO: 34.5 % (ref 37–48.5)
HGB BLD-MCNC: 11.4 G/DL (ref 12–16)
IMM GRANULOCYTES # BLD AUTO: 0.05 K/UL (ref 0–0.04)
IMM GRANULOCYTES NFR BLD AUTO: 0.8 % (ref 0–0.5)
LYMPHOCYTES # BLD AUTO: 0.6 K/UL (ref 1–4.8)
LYMPHOCYTES NFR BLD: 9.1 % (ref 18–48)
MAGNESIUM SERPL-MCNC: 1.8 MG/DL (ref 1.6–2.6)
MCH RBC QN AUTO: 30.5 PG (ref 27–31)
MCHC RBC AUTO-ENTMCNC: 33 G/DL (ref 32–36)
MCV RBC AUTO: 92 FL (ref 82–98)
MONOCYTES # BLD AUTO: 0.7 K/UL (ref 0.3–1)
MONOCYTES NFR BLD: 11 % (ref 4–15)
NEUTROPHILS # BLD AUTO: 5.2 K/UL (ref 1.8–7.7)
NEUTROPHILS NFR BLD: 78.5 % (ref 38–73)
NRBC BLD-RTO: 0 /100 WBC
OVALOCYTES BLD QL SMEAR: ABNORMAL
PLATELET # BLD AUTO: 170 K/UL (ref 150–450)
PLATELET BLD QL SMEAR: ABNORMAL
PMV BLD AUTO: 9.8 FL (ref 9.2–12.9)
POCT GLUCOSE: 107 MG/DL (ref 70–110)
POCT GLUCOSE: 116 MG/DL (ref 70–110)
POCT GLUCOSE: 121 MG/DL (ref 70–110)
POCT GLUCOSE: 77 MG/DL (ref 70–110)
POTASSIUM SERPL-SCNC: 4.3 MMOL/L (ref 3.5–5.1)
RBC # BLD AUTO: 3.74 M/UL (ref 4–5.4)
SODIUM SERPL-SCNC: 132 MMOL/L (ref 136–145)
WBC # BLD AUTO: 6.61 K/UL (ref 3.9–12.7)

## 2023-08-15 PROCEDURE — 63600175 PHARM REV CODE 636 W HCPCS: Performed by: STUDENT IN AN ORGANIZED HEALTH CARE EDUCATION/TRAINING PROGRAM

## 2023-08-15 PROCEDURE — 25000242 PHARM REV CODE 250 ALT 637 W/ HCPCS: Performed by: STUDENT IN AN ORGANIZED HEALTH CARE EDUCATION/TRAINING PROGRAM

## 2023-08-15 PROCEDURE — 94640 AIRWAY INHALATION TREATMENT: CPT | Mod: XB

## 2023-08-15 PROCEDURE — 85025 COMPLETE CBC W/AUTO DIFF WBC: CPT | Performed by: STUDENT IN AN ORGANIZED HEALTH CARE EDUCATION/TRAINING PROGRAM

## 2023-08-15 PROCEDURE — 96372 THER/PROPH/DIAG INJ SC/IM: CPT | Performed by: STUDENT IN AN ORGANIZED HEALTH CARE EDUCATION/TRAINING PROGRAM

## 2023-08-15 PROCEDURE — 25000003 PHARM REV CODE 250: Performed by: NURSE PRACTITIONER

## 2023-08-15 PROCEDURE — 87209 SMEAR COMPLEX STAIN: CPT | Performed by: FAMILY MEDICINE

## 2023-08-15 PROCEDURE — 97530 THERAPEUTIC ACTIVITIES: CPT

## 2023-08-15 PROCEDURE — 94761 N-INVAS EAR/PLS OXIMETRY MLT: CPT

## 2023-08-15 PROCEDURE — 36415 COLL VENOUS BLD VENIPUNCTURE: CPT | Performed by: STUDENT IN AN ORGANIZED HEALTH CARE EDUCATION/TRAINING PROGRAM

## 2023-08-15 PROCEDURE — 97110 THERAPEUTIC EXERCISES: CPT

## 2023-08-15 PROCEDURE — G0378 HOSPITAL OBSERVATION PER HR: HCPCS

## 2023-08-15 PROCEDURE — 97116 GAIT TRAINING THERAPY: CPT | Mod: CQ

## 2023-08-15 PROCEDURE — 80048 BASIC METABOLIC PNL TOTAL CA: CPT | Performed by: STUDENT IN AN ORGANIZED HEALTH CARE EDUCATION/TRAINING PROGRAM

## 2023-08-15 PROCEDURE — 99900035 HC TECH TIME PER 15 MIN (STAT)

## 2023-08-15 PROCEDURE — 83735 ASSAY OF MAGNESIUM: CPT | Performed by: STUDENT IN AN ORGANIZED HEALTH CARE EDUCATION/TRAINING PROGRAM

## 2023-08-15 PROCEDURE — 97530 THERAPEUTIC ACTIVITIES: CPT | Mod: CQ

## 2023-08-15 PROCEDURE — 25000003 PHARM REV CODE 250: Performed by: STUDENT IN AN ORGANIZED HEALTH CARE EDUCATION/TRAINING PROGRAM

## 2023-08-15 PROCEDURE — 25000003 PHARM REV CODE 250: Performed by: FAMILY MEDICINE

## 2023-08-15 RX ORDER — SODIUM BICARBONATE 650 MG/1
650 TABLET ORAL 3 TIMES DAILY
Status: DISCONTINUED | OUTPATIENT
Start: 2023-08-15 | End: 2023-08-16

## 2023-08-15 RX ORDER — CARVEDILOL 6.25 MG/1
6.25 TABLET ORAL 2 TIMES DAILY WITH MEALS
Status: DISCONTINUED | OUTPATIENT
Start: 2023-08-15 | End: 2023-08-16 | Stop reason: HOSPADM

## 2023-08-15 RX ORDER — DIPHENOXYLATE HYDROCHLORIDE AND ATROPINE SULFATE 2.5; .025 MG/1; MG/1
1 TABLET ORAL 4 TIMES DAILY
Status: COMPLETED | OUTPATIENT
Start: 2023-08-15 | End: 2023-08-16

## 2023-08-15 RX ADMIN — ARFORMOTEROL TARTRATE 15 MCG: 15 SOLUTION RESPIRATORY (INHALATION) at 07:08

## 2023-08-15 RX ADMIN — LEVETIRACETAM 500 MG: 500 TABLET, FILM COATED ORAL at 09:08

## 2023-08-15 RX ADMIN — ENOXAPARIN SODIUM 40 MG: 40 INJECTION SUBCUTANEOUS at 06:08

## 2023-08-15 RX ADMIN — GABAPENTIN 200 MG: 100 CAPSULE ORAL at 02:08

## 2023-08-15 RX ADMIN — ROFLUMILAST 500 MCG: 500 TABLET ORAL at 10:08

## 2023-08-15 RX ADMIN — LEVETIRACETAM 500 MG: 500 TABLET, FILM COATED ORAL at 10:08

## 2023-08-15 RX ADMIN — SODIUM BICARBONATE 650 MG TABLET 650 MG: at 10:08

## 2023-08-15 RX ADMIN — IPRATROPIUM BROMIDE AND ALBUTEROL SULFATE 3 ML: 2.5; .5 SOLUTION RESPIRATORY (INHALATION) at 12:08

## 2023-08-15 RX ADMIN — DIPHENOXYLATE HYDROCHLORIDE AND ATROPINE SULFATE 1 TABLET: 2.5; .025 TABLET ORAL at 01:08

## 2023-08-15 RX ADMIN — BUDESONIDE 0.5 MG: 0.5 INHALANT ORAL at 07:08

## 2023-08-15 RX ADMIN — SIMETHICONE 80 MG: 80 TABLET, CHEWABLE ORAL at 10:08

## 2023-08-15 RX ADMIN — GABAPENTIN 200 MG: 100 CAPSULE ORAL at 10:08

## 2023-08-15 RX ADMIN — MELATONIN TAB 3 MG 6 MG: 3 TAB at 09:08

## 2023-08-15 RX ADMIN — ATORVASTATIN CALCIUM 40 MG: 40 TABLET, FILM COATED ORAL at 10:08

## 2023-08-15 RX ADMIN — DORZOLAMIDE HYDROCHLORIDE AND TIMOLOL MALEATE 1 DROP: 22.3; 6.8 SOLUTION/ DROPS OPHTHALMIC at 09:08

## 2023-08-15 RX ADMIN — DILTIAZEM HYDROCHLORIDE 240 MG: 240 CAPSULE, COATED, EXTENDED RELEASE ORAL at 10:08

## 2023-08-15 RX ADMIN — DORZOLAMIDE HYDROCHLORIDE AND TIMOLOL MALEATE 1 DROP: 22.3; 6.8 SOLUTION/ DROPS OPHTHALMIC at 10:08

## 2023-08-15 RX ADMIN — IPRATROPIUM BROMIDE AND ALBUTEROL SULFATE 3 ML: 2.5; .5 SOLUTION RESPIRATORY (INHALATION) at 07:08

## 2023-08-15 RX ADMIN — PANTOPRAZOLE SODIUM 40 MG: 40 TABLET, DELAYED RELEASE ORAL at 10:08

## 2023-08-15 RX ADMIN — SODIUM BICARBONATE 650 MG TABLET 650 MG: at 02:08

## 2023-08-15 RX ADMIN — CONJUGATED ESTROGENS 0.5 G: 0.62 CREAM VAGINAL at 01:08

## 2023-08-15 RX ADMIN — FLUTICASONE PROPIONATE 50 MCG: 50 SPRAY, METERED NASAL at 10:08

## 2023-08-15 RX ADMIN — CARVEDILOL 6.25 MG: 6.25 TABLET, FILM COATED ORAL at 05:08

## 2023-08-15 RX ADMIN — DIPHENOXYLATE HYDROCHLORIDE AND ATROPINE SULFATE 1 TABLET: 2.5; .025 TABLET ORAL at 06:08

## 2023-08-15 RX ADMIN — SODIUM BICARBONATE 650 MG TABLET 650 MG: at 09:08

## 2023-08-15 RX ADMIN — PANTOPRAZOLE SODIUM 40 MG: 40 TABLET, DELAYED RELEASE ORAL at 09:08

## 2023-08-15 RX ADMIN — SIMETHICONE 80 MG: 80 TABLET, CHEWABLE ORAL at 02:08

## 2023-08-15 RX ADMIN — CLOPIDOGREL BISULFATE 75 MG: 75 TABLET ORAL at 10:08

## 2023-08-15 RX ADMIN — ESCITALOPRAM OXALATE 20 MG: 10 TABLET, FILM COATED ORAL at 10:08

## 2023-08-15 RX ADMIN — ESLICARBAZEPINE ACETATE 400 MG: 200 TABLET ORAL at 10:08

## 2023-08-15 RX ADMIN — DIPHENOXYLATE HYDROCHLORIDE AND ATROPINE SULFATE 1 TABLET: 2.5; .025 TABLET ORAL at 09:08

## 2023-08-15 RX ADMIN — SIMETHICONE 80 MG: 80 TABLET, CHEWABLE ORAL at 06:08

## 2023-08-15 RX ADMIN — GABAPENTIN 200 MG: 100 CAPSULE ORAL at 09:08

## 2023-08-15 NOTE — ASSESSMENT & PLAN NOTE
Blood pressure low-normal range, likely d/t volume depletion caused by intractable nausea and vomiting  --home medications include: norvasc, coreg, cardizem, valsartan, and imdur  --hold valsartan for HARESH  --hold imdur given low-normal blood pressure on admission  --restart norvasc, cardizem, and coreg  --PRN IV hydralazine 10mg Q6H for sBP > 175 mmHg for breakthrough  --Q4HVS    Remains hypotensive  Continue de-escalating antihtn medication(s)   Lower coreg due to unsteady gait and history of falls

## 2023-08-15 NOTE — ASSESSMENT & PLAN NOTE
Lab Results   Component Value Date    HGBA1C 5.1 10/26/2022   --controlled  --Home medications include metformin- held upon admission  --low dose SSI ordered  --Accuchecks [facility]  --Fasting AM glucose goal <140

## 2023-08-15 NOTE — ASSESSMENT & PLAN NOTE
--odd presentation, no obvious risk factors  --stool studies ordered, of which includes c. Diff- follow up results  --gentle IVFs for hydration    cdiff negative  Lomotil   Stool ova and parasite pending

## 2023-08-15 NOTE — PROGRESS NOTES
[facility] Medicine  Progress Note    Patient Name: Shireen Felix  MRN: [id number]  Patient Class: OP- Observation   Admission Date: 8/13/2023  Length of Stay: 0 days  Attending Physician: Sandra Geller MD  Primary Care Provider: Laron Chaudhari MD        Subjective:     Principal Problem:HARESH (acute kidney injury)        HPI:  Patient is a 77-year-old woman with a past medical history of COPD, non-insulin-dependent type 2 diabetes mellitus, CAD, essential hypertension with dyslipidemia, history of stroke, GERD, and glaucoma who presented to the ER with intractable nausea with NBNB vomiting.  Onset of symptoms 3-4 days prior to admission, associated with epigastric abdominal pain and frequent nonbloody diarrhea (3-4 episodes a day).  Patient reports symptoms were mild at 1st but soon progressed to the point where she was unable to tolerate any food and was becoming weaker and weaker.  This is what prompted her to come into the ER for evaluation.  No prior history of similar symptoms, no new medication changes, no known sick contacts, no changes in diet, no fevers, chills, chest pain, shortness of breath or skin rashes.    In the ER, patient's vital signs were grossly within normal limits and she remained hemodynamically stable since arrival.  Routine labs showed several abnormalities, including a abnormal urinalysis suggestive of UTI, a mildly elevated procalcitonin of 0.3, hypomagnesemia of 1.2, acute kidney injury with Cr of 1.5  (baseline approximately 0.6), hypokalemia of 2.8, CO2 of 14 and a hypoalbuminemia of 2.9.  Pertinent negatives include a normal lactic acid was 0.7, normal lipase, normal WBC.  No imaging obtained in the ER for review.  Hospital Medicine called for admission.      Overview/Hospital Course:  8/14 admitted for acute kidney injury, which has resolved. Urinalysis positive. Receiving intravenous antibiotic(s). Complaining of diarrhea and dysuria. Cdiff  negative. Denies recurrent utis. Follows urology outpatient for incontinence. Cva tenderness but imaging unremarkable  8/15 continues to complains of diarrhea and concerns of worms. Stool ova and parasite pending. Urine culture unremarkable. Discontinue rocephin. Physical/occupational therapy recommending homehealth physical/occupational therapy.       Interval History: See hospital course for today      Review of Systems   Constitutional:  Positive for activity change (improved) and fatigue. Negative for fever.   Respiratory:  Negative for shortness of breath.    Cardiovascular:  Negative for chest pain.   Gastrointestinal:  Positive for abdominal pain and diarrhea. Negative for nausea and vomiting.   Genitourinary:  Positive for dysuria and vaginal pain.   Musculoskeletal:  Positive for gait problem.   Neurological:  Positive for weakness. Negative for seizures.   Psychiatric/Behavioral:  Negative for agitation, behavioral problems, confusion and decreased concentration. The patient is nervous/anxious.      Objective:     Vital Signs (Most Recent):  Temp: 97.8 °F (36.6 °C) (08/15/23 0745)  Pulse: 86 (08/15/23 0747)  Resp: 16 (08/15/23 0747)  BP: (!) 114/55 (08/15/23 0745)  SpO2: 98 % (08/15/23 0747) Vital Signs (24h Range):  Temp:  [97.3 °F (36.3 °C)-98 °F (36.7 °C)] 97.8 °F (36.6 °C)  Pulse:  [72-89] 86  Resp:  [16-20] 16  SpO2:  [96 %-99 %] 98 %  BP: ()/(50-59) 114/55     Weight: 53 kg (116 lb 13.5 oz)  Body mass index is 20.06 kg/m².    Intake/Output Summary (Last 24 hours) at 8/15/2023 1156  Last data filed at 8/15/2023 0830  Gross per 24 hour   Intake 600 ml   Output --   Net 600 ml         Physical Exam  Vitals and nursing note reviewed. Exam conducted with a chaperone present (nursing).   Constitutional:       General: She is not in acute distress.     Appearance: She is ill-appearing. She is not toxic-appearing.   HENT:      Head: Normocephalic and atraumatic.   Cardiovascular:      Rate and Rhythm:  Normal rate.   Pulmonary:      Effort: Pulmonary effort is normal. No respiratory distress.   Abdominal:      Palpations: Abdomen is soft.      Tenderness: There is no abdominal tenderness.   Genitourinary:     Pubic Area: No rash.       Labia:         Right: Tenderness present.         Left: Tenderness present.       Comments: Vaginal dryness  Musculoskeletal:      Right lower leg: No edema.      Left lower leg: No edema.   Skin:     General: Skin is warm.   Neurological:      Mental Status: She is alert.      Motor: Weakness present.      Comments: Unsteady gait   Psychiatric:         Mood and Affect: Mood is anxious.         Speech: Speech normal.             Significant Labs: All pertinent labs within the past 24 hours have been reviewed.  CBC:   Recent Labs   Lab 08/14/23  0528 08/15/23  0512   WBC 5.44 6.61   HGB 10.1* 11.4*   HCT 30.5* 34.5*   * 170     CMP:   Recent Labs   Lab 08/13/23 2228 08/14/23  0528 08/15/23  0512   * 133* 132*   K 3.4* 3.3* 4.3    109 108   CO2 13* 12* 12*   GLU 80 82 78   BUN 22 19 13   CREATININE 1.0 0.8 0.9   CALCIUM 7.6* 7.2* 7.7*   ANIONGAP 15 12 12       Significant Imaging: I have reviewed all pertinent imaging results/findings within the past 24 hours.      Assessment/Plan:      Vaginal atrophy  Topical estrace       Acute diarrhea  --odd presentation, no obvious risk factors  --stool studies ordered, of which includes c. Diff- follow up results  --gentle IVFs for hydration    cdiff negative  Lomotil   Stool ova and parasite pending    Intractable vomiting with nausea  Etiology unknown. Patient could not recall any risk factors at the time of our encounter. Ddx will default to the most common etiologis: Gastritis, ulcer, reflux, esophagitis, pancreatitis, SBO/Ileus (low suspicion)   --more controlled at the time of our encounter  --maintain on PRN IV therapy for now, modify as necessary  --Lipase and LFTs are normal  --abdominal XR  ordered/pending  --procalcitonin mildly elevated- will add on Flagyl to Rocephin regimen to cover for intraabdominal infxn  --IV PPI 80 mg x1 dose, restart home PPI regimen thereafter  --monitor clinically  --gentle IVFs, CLD, advance slowly  --if persists, consider abdominal CT to further assess    Will advance to low residual diet    Acute cystitis without hematuria  --urinalysis results suggestive of infection  --no prior culture data available for review  --s/p IV Rocephin in the ER- continue upon admission  --follow urine and blood cultures  --monitor fever curve- Tylenol PRN    Urine culture with multiple organism in predominance  Discontinue intravenous rocephin    Chronic diastolic heart failure  --seemingly stable  --Last ECHO from JUNE 2023 confirm EF 50 %  --no signs of fluid overload on exam   --monitor electrolytes, UOP  --strict I&O and daily weights ordered        Seizure  --controlled, hasn't had episode in years  --continue home regimen  --seizure precautions      Coronary artery disease of native artery of native heart with stable angina pectoris  --stable, no active chest pain at this time, however, since ACS can sometimes present as nausea and vomiting   --will order troponin x2 to screen  --continue home statin and antiplatelet therapy  --monitor clinically, PRN EKG    DM (diabetes mellitus) type II controlled with renal manifestation  Lab Results   Component Value Date    HGBA1C 5.1 10/26/2022   --controlled  --Home medications include metformin- held upon admission  --low dose SSI ordered  --Accuchecks ACQ  --Fasting AM glucose goal <140      HTN (hypertension)  Blood pressure low-normal range, likely d/t volume depletion caused by intractable nausea and vomiting  --home medications include: norvasc, coreg, cardizem, valsartan, and imdur  --hold valsartan for HARESH  --hold imdur given low-normal blood pressure on admission  --restart norvasc, cardizem, and coreg  --PRN IV hydralazine 10mg Q6H  for sBP > 175 mmHg for breakthrough  --Q4HVS    Remains hypotensive  Continue de-escalating antihtn medication(s)   Lower coreg due to unsteady gait and history of falls    GERD (gastroesophageal reflux disease)  --reflux and/or gastric ulcer on ddx for presenting symptoms  --will give a 1x dose of IV PPI 80 mg   --restart home PPI but at twice a day  --monitor clinically      VTE Risk Mitigation (From admission, onward)         Ordered     enoxaparin injection 40 mg  Daily         08/13/23 1315     IP VTE HIGH RISK PATIENT  Once         08/13/23 1315     Place sequential compression device  Until discontinued         08/13/23 1315                Discharge Planning   KATHERINE:      Code Status: Full Code   Is the patient medically ready for discharge?:     Reason for patient still in hospital (select all that apply): Patient trending condition, Laboratory test, Treatment, Consult recommendations, PT / OT recommendations and Pending disposition  Discharge Plan A: Home, Home Health                  Sandra Geller MD  Department of Hospital Medicine   [facility] - Telemetry ([facility])

## 2023-08-15 NOTE — PT/OT/SLP PROGRESS
Physical Therapy  Treatment    Shireen Felix   MRN: [id number]   Admitting Diagnosis: HARESH (acute kidney injury)    PT Received On: 08/15/23  PT Start Time: 1040     PT Stop Time: 1110    PT Total Time (min): 30 min       Billable Minutes:  Gait Training 15 and Therapeutic Activity 15    Treatment Type: Treatment  PT/PTA: PTA     Number of PTA visits since last PT visit: 1       General Precautions: Standard, fall, seizure  Orthopedic Precautions: N/A  Braces: N/A  Respiratory Status: Room air         Subjective:  Communicated with patient's nurse, Danna, and completed Epic chart review prior to session.  Patient agreed to PT session.     Pain/Comfort  Pain Rating 1: 0/10  Pain Rating Post-Intervention 1: 0/10    Objective:   Patient found with: telemetry, peripheral IV    Supine > sit EOB: Modified Independent    Forward scoot towards EOB: Modified Independent    STSf rom EOB > RW: CGA    50ft w/ RW SBA    Stand pivot T/F to chair w/ RW: SBA    Completed x10 reps AROM TE to BLE: LAQ, Hip Flex, AP   Intermittent cues given as needed to maintain correct form during repetitions    Educated patient on importance of increased tolerance to upright position and direct impact on CV endurance and strength. Patient encouraged to sit up in chair/ EOB, for a minimum of 2 consecutive hours, 3x per day. Encouraged patient to perform AROM TE to BLE throughout the day within all available planes of motion. Re enforced importance of utilizing call light to meet needs in room and not attempt to get up without staff assistance. Patient verbalized understanding and agreed to comply.      AM-PAC 6 CLICK MOBILITY  How much help from another person does this patient currently need?   1 = Unable, Total/Dependent Assistance  2 = A lot, Maximum/Moderate Assistance  3 = A little, Minimum/Contact Guard/Supervision  4 = None, Modified [place]/Independent    Turning over in bed (including adjusting bedclothes, sheets and blankets)?:  4  Sitting down on and standing up from a chair with arms (e.g., wheelchair, bedside commode, etc.): 3  Moving from lying on back to sitting on the side of the bed?: 4  Moving to and from a bed to a chair (including a wheelchair)?: 4  Need to walk in hospital room?: 4  Climbing 3-5 steps with a railing?: 1 (NT)  Basic Mobility Total Score: 20    AM-PAC Raw Score CMS G-Code Modifier Level of Impairment Assistance   6 % Total / Unable   7 - 9 CM 80 - 100% Maximal Assist   10 - 14 CL 60 - 80% Moderate Assist   15 - 19 CK 40 - 60% Moderate Assist   20 - 22 CJ 20 - 40% Minimal Assist   23 CI 1-20% SBA / CGA   24 CH 0% Independent/ Mod I     Patient left up in chair with call button in reach and chair alarm on.    Assessment:  Shireen Felix is a 77 y.o. female with a medical diagnosis of HARESH (acute kidney injury) and presents with overall decline in functional mobility. Patient would continue to benefit from skilled PT to address functional limitations listed below in order to return to PLOF/decrease caregiver burden. Patient was able to increase gait distance prior to onset of fatigue. Noted some improvement in endurance and activity tolerance as well. Progressing well towards goals established within PT POC.    Rehab identified problem list/impairments: weakness, impaired endurance, impaired self care skills, impaired functional mobility, gait instability, impaired balance, decreased coordination, decreased upper extremity function, decreased lower extremity function, decreased safety awareness, decreased ROM    Rehab potential is good.    Activity tolerance: Fair    Discharge recommendations: home health PT (WITH 24/7 SPV)      Barriers to discharge:      Equipment recommendations: walker, rolling     GOALS:   Multidisciplinary Problems       Physical Therapy Goals          Problem: Physical Therapy    Goal Priority Disciplines Outcome Goal Variances Interventions   Physical Therapy Goal     PT, PT/OT       Description: Goals to be met by 8/28/23.  1. Pt will complete bed mobility MOD I.  2. Pt will complete sit to stand MOD I.  3. Pt will ambulate 200ft MOD I using RW.  4. Pt will increase AMPAC score by 2 points to progress functional mobility.                         PLAN:    Patient to be seen 3 x/week to address the above listed problems via gait training, therapeutic activities, therapeutic exercises  Plan of Care expires: 08/28/23  Plan of Care reviewed with: patient         08/15/2023

## 2023-08-15 NOTE — ASSESSMENT & PLAN NOTE
--urinalysis results suggestive of infection  --no prior culture data available for review  --s/p IV Rocephin in the ER- continue upon admission  --follow urine and blood cultures  --monitor fever curve- Tylenol PRN    Urine culture with multiple organism in predominance  Discontinue intravenous rocephin

## 2023-08-15 NOTE — PLAN OF CARE
Problem: Adult Inpatient Plan of Care  Goal: Plan of Care Review  Outcome: Ongoing, Progressing     Problem: Diabetes Comorbidity  Goal: Blood Glucose Level Within Targeted Range  Outcome: Ongoing, Progressing  Intervention: Monitor and Manage Glycemia  Flowsheets (Taken 8/15/2023 5821)  Glycemic Management: blood glucose monitored     Problem: Infection  Goal: Absence of Infection Signs and Symptoms  Outcome: Ongoing, Progressing     Problem: Skin Injury Risk Increased  Goal: Skin Health and Integrity  Outcome: Ongoing, Progressing     Problem: Fall Injury Risk  Goal: Absence of Fall and Fall-Related Injury  Outcome: Ongoing, Progressing

## 2023-08-15 NOTE — PT/OT/SLP PROGRESS
Occupational Therapy   Treatment    Name: Shireen Felix  MRN: [id number]  Admitting Diagnosis:  HARESH (acute kidney injury)       Recommendations:     Discharge Recommendations: home health OT (with 24/7 spv)  Discharge Equipment Recommendations:  walker, rolling  Barriers to discharge:  Decreased caregiver support    Assessment:     Shireen Felix is a 77 y.o. female with a medical diagnosis of HARESH (acute kidney injury).  She presents with the following performance deficits affecting function are weakness, impaired endurance, impaired self care skills, impaired functional mobility, gait instability, impaired balance, decreased coordination, decreased upper extremity function, decreased lower extremity function, decreased safety awareness, decreased ROM.     Rehab Prognosis:  Good; patient would benefit from acute skilled OT services to address these deficits and reach maximum level of function.       Plan:     Patient to be seen 2 x/week to address the above listed problems via self-care/home management, therapeutic activities, therapeutic exercises  Plan of Care Expires: 08/28/23  Plan of Care Reviewed with: patient    Subjective     Chief Complaint: weakness  Patient/Family Comments/goals: get better  Pain/Comfort:  Pain Rating 1: 0/10    Objective:     Communicated with: Nurse and epic chart review prior to session.  Patient found supine with telemetry, peripheral IV upon OT entry to room.    General Precautions: Standard, fall, seizure    Orthopedic Precautions:N/A  Braces: N/A  Respiratory Status: Room air     Occupational Performance:     Bed Mobility:    Patient completed Rolling/Turning to Right with modified independence  Patient completed Scooting/Bridging with modified independence  Patient completed Supine to Sit with modified independence     Functional Mobility/Transfers:  Patient completed Sit <> Stand Transfer with contact guard assistance  with  rolling walker   Patient completed Bed <>  Chair Transfer using Stand Pivot technique with stand by assistance with rolling walker  Functional Mobility: Patient completed x50ft functional mobility with SBA and RW to increase dynamic standing balance and activity tolerance needed for ADL completion.     Activities of Daily Living:  Grooming: setup A comb hair in chair    [facility] 6 Click ADL: 22    Treatment & Education:  Pt performed x10 reps BUE AROM therex in chair:  Shoulder flexion  Elbow flexion/ext  Wrist flexion/ext  Digit flexion/ext  Pt also completed x5 reps trunk rotations in chair. Reviewed role of OT in acute setting and benefits of participation. Educated on techniques to use to increase independence and decrease fall risk with functional transfers. Educated on importance of OOB activity and calling for A to transfer back to bed. Encouraged completion of B UE AROM therex throughout the day to tolerance to increase functional strength and activity tolerance. Educated patient on importance of increased tolerance to upright position and direct impact on CV endurance and strength. Patient encouraged to sit up in chair for a minimum of 2 consecutive hours per day.  Patient stated understanding and in agreement with POC.     Patient left up in chair with all lines intact, call button in reach, and chair alarm on    GOALS:   Multidisciplinary Problems       Occupational Therapy Goals          Problem: Occupational Therapy    Goal Priority Disciplines Outcome Interventions   Occupational Therapy Goal     OT, PT/OT Ongoing, Progressing    Description: Goals to be met by: 8/28/23     Patient will increase functional independence with ADLs by performing:    Grooming while standing at sink with Modified [place].  Toileting from toilet with Set-up Assistance for hygiene and clothing management.   Toilet transfer to toilet with Modified [place] with RW.  Upper extremity exercise program x15 reps per handout, with independence.                          Time Tracking:     OT Date of Treatment: 08/15/23  OT Start Time: 1040  OT Stop Time: 1110  OT Total Time (min): 30 min    Billable Minutes:Therapeutic Activity 15  Therapeutic Exercise 15    OT/CORINA: OT      Keesha Grimm OT     8/15/2023

## 2023-08-15 NOTE — PLAN OF CARE
Continue OT POC.  Mod (I) for bed mobility. CGA for sit>stand with RW. SBA for ambulation 50ft with RW.

## 2023-08-15 NOTE — SUBJECTIVE & OBJECTIVE
Interval History: See hospital course for today      Review of Systems   Constitutional:  Positive for activity change (improved) and fatigue. Negative for fever.   Respiratory:  Negative for shortness of breath.    Cardiovascular:  Negative for chest pain.   Gastrointestinal:  Positive for abdominal pain and diarrhea. Negative for nausea and vomiting.   Genitourinary:  Positive for dysuria and vaginal pain.   Musculoskeletal:  Positive for gait problem.   Neurological:  Positive for weakness. Negative for seizures.   Psychiatric/Behavioral:  Negative for agitation, behavioral problems, confusion and decreased concentration. The patient is nervous/anxious.      Objective:     Vital Signs (Most Recent):  Temp: 97.8 °F (36.6 °C) (08/15/23 0745)  Pulse: 86 (08/15/23 0747)  Resp: 16 (08/15/23 0747)  BP: (!) 114/55 (08/15/23 0745)  SpO2: 98 % (08/15/23 0747) Vital Signs (24h Range):  Temp:  [97.3 °F (36.3 °C)-98 °F (36.7 °C)] 97.8 °F (36.6 °C)  Pulse:  [72-89] 86  Resp:  [16-20] 16  SpO2:  [96 %-99 %] 98 %  BP: ()/(50-59) 114/55     Weight: 53 kg (116 lb 13.5 oz)  Body mass index is 20.06 kg/m².    Intake/Output Summary (Last 24 hours) at 8/15/2023 1156  Last data filed at 8/15/2023 0830  Gross per 24 hour   Intake 600 ml   Output --   Net 600 ml         Physical Exam  Vitals and nursing note reviewed. Exam conducted with a chaperone present (nursing).   Constitutional:       General: She is not in acute distress.     Appearance: She is ill-appearing. She is not toxic-appearing.   HENT:      Head: Normocephalic and atraumatic.   Cardiovascular:      Rate and Rhythm: Normal rate.   Pulmonary:      Effort: Pulmonary effort is normal. No respiratory distress.   Abdominal:      Palpations: Abdomen is soft.      Tenderness: There is no abdominal tenderness.   Genitourinary:     Pubic Area: No rash.       Labia:         Right: Tenderness present.         Left: Tenderness present.       Comments: Vaginal  dryness  Musculoskeletal:      Right lower leg: No edema.      Left lower leg: No edema.   Skin:     General: Skin is warm.   Neurological:      Mental Status: She is alert.      Motor: Weakness present.      Comments: Unsteady gait   Psychiatric:         Mood and Affect: Mood is anxious.         Speech: Speech normal.             Significant Labs: All pertinent labs within the past 24 hours have been reviewed.  CBC:   Recent Labs   Lab 08/14/23  0528 08/15/23  0512   WBC 5.44 6.61   HGB 10.1* 11.4*   HCT 30.5* 34.5*   * 170     CMP:   Recent Labs   Lab 08/13/23 2228 08/14/23  0528 08/15/23  0512   * 133* 132*   K 3.4* 3.3* 4.3    109 108   CO2 13* 12* 12*   GLU 80 82 78   BUN 22 19 13   CREATININE 1.0 0.8 0.9   CALCIUM 7.6* 7.2* 7.7*   ANIONGAP 15 12 12       Significant Imaging: I have reviewed all pertinent imaging results/findings within the past 24 hours.

## 2023-08-15 NOTE — PLAN OF CARE
Patient still with diarrhea episodes, stool sample awaiting parasite test.  Problem: Adult Inpatient Plan of Care  Goal: Plan of Care Review  Outcome: Ongoing, Progressing  Goal: Patient-Specific Goal (Individualized)  Outcome: Ongoing, Progressing  Goal: Absence of Hospital-Acquired Illness or Injury  Outcome: Ongoing, Progressing  Goal: Optimal Comfort and Wellbeing  Outcome: Ongoing, Progressing  Goal: Readiness for Transition of Care  Outcome: Ongoing, Progressing     Problem: Diabetes Comorbidity  Goal: Blood Glucose Level Within Targeted Range  Outcome: Ongoing, Progressing     Problem: Fluid and Electrolyte Imbalance (Acute Kidney Injury/Impairment)  Goal: Fluid and Electrolyte Balance  Outcome: Ongoing, Progressing     Problem: Oral Intake Inadequate (Acute Kidney Injury/Impairment)  Goal: Optimal Nutrition Intake  Outcome: Ongoing, Progressing     Problem: Renal Function Impairment (Acute Kidney Injury/Impairment)  Goal: Effective Renal Function  Outcome: Ongoing, Progressing     Problem: Infection  Goal: Absence of Infection Signs and Symptoms  Outcome: Ongoing, Progressing     Problem: Skin Injury Risk Increased  Goal: Skin Health and Integrity  Outcome: Ongoing, Progressing     Problem: Electrolyte Imbalance  Goal: Electrolyte Balance  Outcome: Ongoing, Progressing     Problem: Fall Injury Risk  Goal: Absence of Fall and Fall-Related Injury  Outcome: Ongoing, Progressing

## 2023-08-15 NOTE — ASSESSMENT & PLAN NOTE
Etiology unknown. Patient could not recall any risk factors at the time of our encounter. Ddx will default to the most common etiologis: Gastritis, ulcer, reflux, esophagitis, pancreatitis, SBO/Ileus (low suspicion)   --more controlled at the time of our encounter  --maintain on PRN IV therapy for now, modify as necessary  --Lipase and LFTs are normal  --abdominal XR ordered/pending  --procalcitonin mildly elevated- will add on Flagyl to Rocephin regimen to cover for intraabdominal infxn  --IV PPI 80 mg x1 dose, restart home PPI regimen thereafter  --monitor clinically  --gentle IVFs, CLD, advance slowly  --if persists, consider abdominal CT to further assess    Will advance to low residual diet

## 2023-08-15 NOTE — PLAN OF CARE
[facility] - Telemetry (Hospital)  Initial Discharge Assessment       Primary Care Provider: Laron Chaudhari MD    Admission Diagnosis: Dehydration [E86.0]  Hypokalemia [E87.6]  Hypomagnesemia [E83.42]  Weakness [R53.1]  Acute cystitis without hematuria [N30.00]  HARESH (acute kidney injury) [N17.9]  Chest pain [R07.9]    Admission Date: 8/13/2023  Expected Discharge Date:     Transition of Care Barriers: None    Payor: [employer] VTM MEDICARE / Plan: [employer] H[employer] PPO SPECIAL NEEDS / Product Type: Medicare Advantage /     Extended Emergency Contact Information  Primary Emergency Contact: RustyRm   [place]  Mobile Phone: [phone]  Relation: Brother  Secondary Emergency Contact: Nettie Goff  Mobile Phone: [phone]  Relation: Daughter    Discharge Plan A: Home, Home Health  Discharge Plan B: Skilled Nursing Facility      [facility] [address] [facility]  [address] [facility] [address]  Phone: [phone] Fax: [phone]      Initial Assessment (most recent)       Adult Discharge Assessment - 08/15/23 0801          Discharge Assessment    Assessment Type Discharge Planning Assessment     Confirmed/corrected address, phone number and insurance Yes     Confirmed Demographics Correct on Facesheet     Source of Information patient     When was your last doctors appointment? 07/12/23     Communicated KATHERINE with patient/caregiver Date not available/Unable to determine     Reason For Admission HARESH     People in Home alone     Facility Arrived From: home     Do you expect to return to your current living situation? Yes     Do you have help at home or someone to help you manage your care at home? No     Prior to hospitilization cognitive status: Alert/Oriented     Current cognitive status: Alert/Oriented     Equipment Currently Used at Home bath bench;shower chair;rollator;wheelchair;cane, straight     Readmission within 30 days? No     Patient currently being  followed by outpatient case management? No     Do you currently have service(s) that help you manage your care at home? No     Do you take prescription medications? Yes     Do you have prescription coverage? Yes     Coverage [employer]     Do you have any problems affording any of your prescribed medications? No     Is the patient taking medications as prescribed? yes     Who is going to help you get home at discharge? brother     How do you get to doctors appointments? family or friend will provide     Are you on dialysis? No     Do you take coumadin? No     Discharge Plan A Home;Home Health     Discharge Plan B Skilled Nursing Facility     DME Needed Upon Discharge  walker, rolling     Discharge Plan discussed with: Patient     Transition of Care Barriers None                   Met with patient.  She lives alone in a trailer.  Patient's brother and 2 daughters live next door and behind her.  Brother helps when needed.  He is her transportation to her doctor appointments as she does not drive.  She is independent with ADL's and uses medical equipment as needed.  Patient discussed going to a SNF.  Advised will check therapy noted.  Discharge plan is home health vs SNF.

## 2023-08-16 VITALS
WEIGHT: 116.88 LBS | DIASTOLIC BLOOD PRESSURE: 62 MMHG | HEIGHT: 64 IN | TEMPERATURE: 98 F | RESPIRATION RATE: 18 BRPM | HEART RATE: 78 BPM | OXYGEN SATURATION: 97 % | SYSTOLIC BLOOD PRESSURE: 149 MMHG | BODY MASS INDEX: 19.96 KG/M2

## 2023-08-16 PROBLEM — D64.9 ACUTE ANEMIA: Status: ACTIVE | Noted: 2023-08-16

## 2023-08-16 PROBLEM — R11.2 INTRACTABLE VOMITING WITH NAUSEA: Status: RESOLVED | Noted: 2023-08-13 | Resolved: 2023-08-16

## 2023-08-16 PROBLEM — N30.00 ACUTE CYSTITIS WITHOUT HEMATURIA: Status: RESOLVED | Noted: 2023-08-13 | Resolved: 2023-08-16

## 2023-08-16 LAB
ANION GAP SERPL CALC-SCNC: 9 MMOL/L (ref 8–16)
BASOPHILS # BLD AUTO: 0.02 K/UL (ref 0–0.2)
BASOPHILS NFR BLD: 0.4 % (ref 0–1.9)
BUN SERPL-MCNC: 9 MG/DL (ref 8–23)
CALCIUM SERPL-MCNC: 7 MG/DL (ref 8.7–10.5)
CHLORIDE SERPL-SCNC: 113 MMOL/L (ref 95–110)
CO2 SERPL-SCNC: 14 MMOL/L (ref 23–29)
CREAT SERPL-MCNC: 0.8 MG/DL (ref 0.5–1.4)
DIFFERENTIAL METHOD: ABNORMAL
E COLI SXT1 STL QL IA: NEGATIVE
E COLI SXT2 STL QL IA: NEGATIVE
ELASTASE 1, FECAL: 50 MCG/G
EOSINOPHIL # BLD AUTO: 0 K/UL (ref 0–0.5)
EOSINOPHIL NFR BLD: 0.6 % (ref 0–8)
ERYTHROCYTE [DISTWIDTH] IN BLOOD BY AUTOMATED COUNT: 14.7 % (ref 11.5–14.5)
EST. GFR  (NO RACE VARIABLE): >60 ML/MIN/1.73 M^2
GLUCOSE SERPL-MCNC: 102 MG/DL (ref 70–110)
HCT VFR BLD AUTO: 28.9 % (ref 37–48.5)
HGB BLD-MCNC: 9.6 G/DL (ref 12–16)
IMM GRANULOCYTES # BLD AUTO: 0.03 K/UL (ref 0–0.04)
IMM GRANULOCYTES NFR BLD AUTO: 0.6 % (ref 0–0.5)
LYMPHOCYTES # BLD AUTO: 0.8 K/UL (ref 1–4.8)
LYMPHOCYTES NFR BLD: 16 % (ref 18–48)
MAGNESIUM SERPL-MCNC: 1.7 MG/DL (ref 1.6–2.6)
MCH RBC QN AUTO: 30 PG (ref 27–31)
MCHC RBC AUTO-ENTMCNC: 33.2 G/DL (ref 32–36)
MCV RBC AUTO: 90 FL (ref 82–98)
MONOCYTES # BLD AUTO: 0.7 K/UL (ref 0.3–1)
MONOCYTES NFR BLD: 14 % (ref 4–15)
NEUTROPHILS # BLD AUTO: 3.4 K/UL (ref 1.8–7.7)
NEUTROPHILS NFR BLD: 68.4 % (ref 38–73)
NRBC BLD-RTO: 0 /100 WBC
PLATELET # BLD AUTO: 148 K/UL (ref 150–450)
PMV BLD AUTO: 9.7 FL (ref 9.2–12.9)
POCT GLUCOSE: 111 MG/DL (ref 70–110)
POCT GLUCOSE: 93 MG/DL (ref 70–110)
POTASSIUM SERPL-SCNC: 3.3 MMOL/L (ref 3.5–5.1)
RBC # BLD AUTO: 3.2 M/UL (ref 4–5.4)
SODIUM SERPL-SCNC: 136 MMOL/L (ref 136–145)
WBC # BLD AUTO: 5.01 K/UL (ref 3.9–12.7)

## 2023-08-16 PROCEDURE — 97530 THERAPEUTIC ACTIVITIES: CPT | Mod: CQ

## 2023-08-16 PROCEDURE — 25000242 PHARM REV CODE 250 ALT 637 W/ HCPCS: Performed by: STUDENT IN AN ORGANIZED HEALTH CARE EDUCATION/TRAINING PROGRAM

## 2023-08-16 PROCEDURE — 36415 COLL VENOUS BLD VENIPUNCTURE: CPT | Performed by: STUDENT IN AN ORGANIZED HEALTH CARE EDUCATION/TRAINING PROGRAM

## 2023-08-16 PROCEDURE — 96365 THER/PROPH/DIAG IV INF INIT: CPT | Mod: 59

## 2023-08-16 PROCEDURE — 94640 AIRWAY INHALATION TREATMENT: CPT | Mod: XB

## 2023-08-16 PROCEDURE — 85025 COMPLETE CBC W/AUTO DIFF WBC: CPT | Performed by: STUDENT IN AN ORGANIZED HEALTH CARE EDUCATION/TRAINING PROGRAM

## 2023-08-16 PROCEDURE — 25000003 PHARM REV CODE 250: Performed by: STUDENT IN AN ORGANIZED HEALTH CARE EDUCATION/TRAINING PROGRAM

## 2023-08-16 PROCEDURE — 83735 ASSAY OF MAGNESIUM: CPT | Performed by: STUDENT IN AN ORGANIZED HEALTH CARE EDUCATION/TRAINING PROGRAM

## 2023-08-16 PROCEDURE — 63600175 PHARM REV CODE 636 W HCPCS: Performed by: FAMILY MEDICINE

## 2023-08-16 PROCEDURE — 94761 N-INVAS EAR/PLS OXIMETRY MLT: CPT

## 2023-08-16 PROCEDURE — 80048 BASIC METABOLIC PNL TOTAL CA: CPT | Performed by: STUDENT IN AN ORGANIZED HEALTH CARE EDUCATION/TRAINING PROGRAM

## 2023-08-16 PROCEDURE — 97116 GAIT TRAINING THERAPY: CPT | Mod: CQ

## 2023-08-16 PROCEDURE — 97110 THERAPEUTIC EXERCISES: CPT

## 2023-08-16 PROCEDURE — G0378 HOSPITAL OBSERVATION PER HR: HCPCS

## 2023-08-16 PROCEDURE — 25500020 PHARM REV CODE 255: Performed by: FAMILY MEDICINE

## 2023-08-16 PROCEDURE — 25000003 PHARM REV CODE 250: Performed by: FAMILY MEDICINE

## 2023-08-16 PROCEDURE — 97530 THERAPEUTIC ACTIVITIES: CPT

## 2023-08-16 RX ORDER — SUCRALFATE 1 G/10ML
1 SUSPENSION ORAL EVERY 6 HOURS
Status: DISCONTINUED | OUTPATIENT
Start: 2023-08-16 | End: 2023-08-16 | Stop reason: HOSPADM

## 2023-08-16 RX ORDER — DICYCLOMINE HYDROCHLORIDE 10 MG/1
10 CAPSULE ORAL 2 TIMES DAILY
Qty: 10 CAPSULE | Refills: 0 | Status: SHIPPED | OUTPATIENT
Start: 2023-08-16

## 2023-08-16 RX ORDER — CEFDINIR 300 MG/1
300 CAPSULE ORAL 2 TIMES DAILY
Qty: 6 CAPSULE | Refills: 0 | Status: SHIPPED | OUTPATIENT
Start: 2023-08-16 | End: 2023-08-19

## 2023-08-16 RX ORDER — METRONIDAZOLE 500 MG/1
500 TABLET ORAL 3 TIMES DAILY
Qty: 9 TABLET | Refills: 0 | Status: SHIPPED | OUTPATIENT
Start: 2023-08-16 | End: 2023-08-19

## 2023-08-16 RX ORDER — DICYCLOMINE HYDROCHLORIDE 10 MG/1
10 CAPSULE ORAL 4 TIMES DAILY
Status: COMPLETED | OUTPATIENT
Start: 2023-08-16 | End: 2023-08-16

## 2023-08-16 RX ORDER — CARVEDILOL 6.25 MG/1
6.25 TABLET ORAL 2 TIMES DAILY WITH MEALS
Qty: 60 TABLET | Refills: 0 | Status: SHIPPED | OUTPATIENT
Start: 2023-08-16 | End: 2023-10-19 | Stop reason: SDUPTHER

## 2023-08-16 RX ORDER — DIPHENOXYLATE HYDROCHLORIDE AND ATROPINE SULFATE 2.5; .025 MG/1; MG/1
1 TABLET ORAL 4 TIMES DAILY PRN
Status: DISCONTINUED | OUTPATIENT
Start: 2023-08-16 | End: 2023-08-16 | Stop reason: HOSPADM

## 2023-08-16 RX ORDER — DICYCLOMINE HYDROCHLORIDE 10 MG/1
10 CAPSULE ORAL 4 TIMES DAILY
Status: DISCONTINUED | OUTPATIENT
Start: 2023-08-16 | End: 2023-08-16

## 2023-08-16 RX ORDER — POTASSIUM CHLORIDE 7.45 MG/ML
10 INJECTION INTRAVENOUS
Status: DISCONTINUED | OUTPATIENT
Start: 2023-08-16 | End: 2023-08-16

## 2023-08-16 RX ORDER — MAGNESIUM SULFATE 1 G/100ML
1 INJECTION INTRAVENOUS ONCE
Status: COMPLETED | OUTPATIENT
Start: 2023-08-16 | End: 2023-08-16

## 2023-08-16 RX ADMIN — ESCITALOPRAM OXALATE 20 MG: 10 TABLET, FILM COATED ORAL at 09:08

## 2023-08-16 RX ADMIN — SIMETHICONE 80 MG: 80 TABLET, CHEWABLE ORAL at 09:08

## 2023-08-16 RX ADMIN — ATORVASTATIN CALCIUM 40 MG: 40 TABLET, FILM COATED ORAL at 09:08

## 2023-08-16 RX ADMIN — SIMETHICONE 80 MG: 80 TABLET, CHEWABLE ORAL at 02:08

## 2023-08-16 RX ADMIN — PANTOPRAZOLE SODIUM 40 MG: 40 TABLET, DELAYED RELEASE ORAL at 09:08

## 2023-08-16 RX ADMIN — DICYCLOMINE HYDROCHLORIDE 10 MG: 10 CAPSULE ORAL at 01:08

## 2023-08-16 RX ADMIN — CARVEDILOL 6.25 MG: 6.25 TABLET, FILM COATED ORAL at 07:08

## 2023-08-16 RX ADMIN — POTASSIUM BICARBONATE 25 MEQ: 978 TABLET, EFFERVESCENT ORAL at 08:08

## 2023-08-16 RX ADMIN — MAGNESIUM SULFATE 1 G: 1 INJECTION INTRAVENOUS at 09:08

## 2023-08-16 RX ADMIN — IPRATROPIUM BROMIDE AND ALBUTEROL SULFATE 3 ML: 2.5; .5 SOLUTION RESPIRATORY (INHALATION) at 07:08

## 2023-08-16 RX ADMIN — IPRATROPIUM BROMIDE AND ALBUTEROL SULFATE 3 ML: 2.5; .5 SOLUTION RESPIRATORY (INHALATION) at 01:08

## 2023-08-16 RX ADMIN — ARFORMOTEROL TARTRATE 15 MCG: 15 SOLUTION RESPIRATORY (INHALATION) at 07:08

## 2023-08-16 RX ADMIN — DICYCLOMINE HYDROCHLORIDE 10 MG: 10 CAPSULE ORAL at 11:08

## 2023-08-16 RX ADMIN — ROFLUMILAST 500 MCG: 500 TABLET ORAL at 09:08

## 2023-08-16 RX ADMIN — DILTIAZEM HYDROCHLORIDE 240 MG: 240 CAPSULE, COATED, EXTENDED RELEASE ORAL at 09:08

## 2023-08-16 RX ADMIN — LEVETIRACETAM 500 MG: 500 TABLET, FILM COATED ORAL at 09:08

## 2023-08-16 RX ADMIN — GABAPENTIN 200 MG: 100 CAPSULE ORAL at 09:08

## 2023-08-16 RX ADMIN — DIPHENOXYLATE HYDROCHLORIDE AND ATROPINE SULFATE 1 TABLET: 2.5; .025 TABLET ORAL at 09:08

## 2023-08-16 RX ADMIN — DORZOLAMIDE HYDROCHLORIDE AND TIMOLOL MALEATE 1 DROP: 22.3; 6.8 SOLUTION/ DROPS OPHTHALMIC at 09:08

## 2023-08-16 RX ADMIN — BUDESONIDE 0.5 MG: 0.5 INHALANT ORAL at 07:08

## 2023-08-16 RX ADMIN — ESLICARBAZEPINE ACETATE 400 MG: 200 TABLET ORAL at 09:08

## 2023-08-16 RX ADMIN — IOHEXOL 100 ML: 350 INJECTION, SOLUTION INTRAVENOUS at 01:08

## 2023-08-16 RX ADMIN — GABAPENTIN 200 MG: 100 CAPSULE ORAL at 03:08

## 2023-08-16 RX ADMIN — POTASSIUM CHLORIDE 10 MEQ: 10 INJECTION, SOLUTION INTRAVENOUS at 01:08

## 2023-08-16 RX ADMIN — CLOPIDOGREL BISULFATE 75 MG: 75 TABLET ORAL at 09:08

## 2023-08-16 NOTE — ASSESSMENT & PLAN NOTE
Lab Results   Component Value Date    HGBA1C 5.1 10/26/2022   --controlled  --Home medications include metformin- held upon admission  --low dose SSI ordered  --Accuchecks [facility]  --Fasting AM glucose goal <140

## 2023-08-16 NOTE — DISCHARGE SUMMARY
[facility] Medicine  Discharge Summary      Patient Name: Shireen Felix  MRN: [id number]  [facility]: [id number]  Patient Class: OP- Observation  Admission Date: 8/13/2023  Hospital Length of Stay: 0 days  Discharge Date and Time:  08/16/2023 2:34 PM  Attending Physician: Sandra Geller MD   Discharging Provider: Sandra Geller MD  Primary Care Provider: Laron Chaudhari MD    Primary Care Team: Networked reference to record PCT     HPI:   Patient is a 77-year-old woman with a past medical history of COPD, non-insulin-dependent type 2 diabetes mellitus, CAD, essential hypertension with dyslipidemia, history of stroke, GERD, and glaucoma who presented to the ER with intractable nausea with NBNB vomiting.  Onset of symptoms 3-4 days prior to admission, associated with epigastric abdominal pain and frequent nonbloody diarrhea (3-4 episodes a day).  Patient reports symptoms were mild at 1st but soon progressed to the point where she was unable to tolerate any food and was becoming weaker and weaker.  This is what prompted her to come into the ER for evaluation.  No prior history of similar symptoms, no new medication changes, no known sick contacts, no changes in diet, no fevers, chills, chest pain, shortness of breath or skin rashes.    In the ER, patient's vital signs were grossly within normal limits and she remained hemodynamically stable since arrival.  Routine labs showed several abnormalities, including a abnormal urinalysis suggestive of UTI, a mildly elevated procalcitonin of 0.3, hypomagnesemia of 1.2, acute kidney injury with Cr of 1.5  (baseline approximately 0.6), hypokalemia of 2.8, CO2 of 14 and a hypoalbuminemia of 2.9.  Pertinent negatives include a normal lactic acid was 0.7, normal lipase, normal WBC.  No imaging obtained in the ER for review.  Hospital Medicine called for admission.      * No surgery found *      Hospital Course:   8/14 admitted for acute kidney injury,  which has resolved. Urinalysis positive. Receiving intravenous antibiotic(s). Complaining of diarrhea and dysuria. Cdiff negative. Denies recurrent utis. Follows urology outpatient for incontinence. Cva tenderness but imaging unremarkable  8/15 continues to complains of diarrhea and concerns of worms. Stool ova and parasite pending. Urine culture unremarkable. Discontinue rocephin. Physical/occupational therapy recommending homehealth physical/occupational therapy.   8/16 hb/hct downtrending. Diarrhea improving. Cta GI bleed pending. Pmh polyps on cscope approximately 5 years ago and failed to follow up. Consider gastroenterology consult pending imaging    Cta GI bleed with no acute bleeding but concerns for diverticulitis. Received 2 days of rocephin. Will send additional course of flagyl and cefdinir.    Stool growing salmonella. Received rocephin. Continue 3rd gen cephalosporin po.    Patient seen and evaluated by me. Patient was determined to be suitable for discharge. Patient deemed stable for discharge to home with homehealth physical/occupational therapy and nurse practitioner to visit home program.          Goals of Care Treatment Preferences:  Code Status: Full Code      Consults:     No new Assessment & Plan notes have been filed under this hospital service since the last note was generated.  Service: Hospital Medicine    Final Active Diagnoses:    Diagnosis Date Noted POA    Acute anemia [D64.9] 08/16/2023 No    Vaginal atrophy [N95.2] 08/15/2023 Yes    Acute diarrhea [R19.7] 08/13/2023 Yes    Chronic diastolic heart failure [I50.32] 03/15/2022 Yes    Seizure [R56.9] 12/23/2019 Yes    Coronary artery disease of native artery of native heart with stable angina pectoris [I25.118]  Yes    DM (diabetes mellitus) type II controlled with renal manifestation [E11.29] 08/04/2019 Yes    GERD (gastroesophageal reflux disease) [K21.9] 03/26/2015 Yes    HTN (hypertension) [I10] 01/12/2015 Yes      Problems  "Resolved During this Admission:    Diagnosis Date Noted Date Resolved POA    PRINCIPAL PROBLEM:  HARESH (acute kidney injury) [N17.9] 08/13/2023 08/15/2023 Yes    Acute cystitis without hematuria [N30.00] 08/13/2023 08/16/2023 Yes    Intractable vomiting with nausea [R11.2] 08/13/2023 08/16/2023 Yes    Hypokalemia [E87.6] 06/03/2023 08/15/2023 Yes       Discharged Condition: stable    Disposition: Home or Self Care    Follow Up:   Follow-up Information     Laron Chaudhari MD. Schedule an appointment as soon as possible for a visit in 3 day(s).    Specialties: Internal Medicine, Pediatrics  Why: hospital follow up  Contact information:  [address]  [place] [address]  [phone]             Jarrell Dye MD. Schedule an appointment as soon as possible for a visit in 2 week(s).    Specialties: Cardiology, Internal Medicine  Why: hospital follow up  Contact information:  [address]  [place] [address]  [phone]             Franck Mueller MD. Schedule an appointment as soon as possible for a visit in 1 month(s).    Specialty: Nephrology  Why: hospital follow up  Contact information:  [address]  [place] [address]  [phone]             Ember Veras NP. Schedule an appointment as soon as possible for a visit in 1 month(s).    Specialty: Hematology and Oncology  Why: hospital follow up  Contact information:  [address] [place] [address] DR Rhonda RAMOS [id number] [phone]                       Patient Instructions:      WALKER FOR HOME USE     Order Specific Question Answer Comments   Type of Walker: Adult (5'4"-6'6")    With wheels? Yes    Height: 5' 4" (1.626 m)    Weight: 53 kg (116 lb 13.5 oz)    Length of need (1-99 months): 99    Does patient have medical equipment at home? bath bench    Does patient have medical equipment at home? rollator    Does patient have medical equipment at home? wheelchair    Does patient have medical equipment at home? cane, straight  "   Please check all that apply: Patient's condition impairs ambulation.      Ambulatory referral/consult to [facility]   Standing Status: Future   Referral Priority: Routine Referral Type: Consultation   Referral Reason: Specialty Services Required   Number of Visits Requested: 1     Ambulatory referral/consult to [facility]   Standing Status: Future   Referral Priority: Routine Referral Type: Consultation   Referral Reason: Specialty Services Required   Number of Visits Requested: 1     Ambulatory referral/consult to Gastroenterology   Standing Status: Future   Referral Priority: Routine Referral Type: Consultation   Referral Reason: Specialty Services Required   Requested Specialty: Gastroenterology   Number of Visits Requested: 1     Ambulatory referral/consult to Home Health   Standing Status: Future   Referral Priority: Routine Referral Type: Home Health   Referral Reason: Specialty Services Required   Requested Specialty: Home Health Services   Number of Visits Requested: 1     Diet Cardiac     Activity as tolerated       Significant Diagnostic Studies: Labs:   BMP:   Recent Labs   Lab 08/15/23  0512 08/16/23  0516   GLU 78 102   * 136   K 4.3 3.3*    113*   CO2 12* 14*   BUN 13 9   CREATININE 0.9 0.8   CALCIUM 7.7* 7.0*   MG 1.8 1.7   , CMP   Recent Labs   Lab 08/15/23  0512 08/16/23  0516   * 136   K 4.3 3.3*    113*   CO2 12* 14*   GLU 78 102   BUN 13 9   CREATININE 0.9 0.8   CALCIUM 7.7* 7.0*   ANIONGAP 12 9   , CBC   Recent Labs   Lab 08/15/23  0512 08/16/23  0516   WBC 6.61 5.01   HGB 11.4* 9.6*   HCT 34.5* 28.9*    148*    and All labs within the past 24 hours have been reviewed  Microbiology:   Blood Culture   Lab Results   Component Value Date    LABBLOO No Growth to date 08/13/2023    LABBLOO No Growth to date 08/13/2023    LABBLOO No Growth to date 08/13/2023   , Urine Culture    Lab Results   Component  Value Date    LABURIN  08/13/2023     Multiple organisms isolated. None in predominance.  Repeat if    LABURIN clinically necessary. 08/13/2023    and stool culture growing salmonella  Radiology: X-Ray: CXR: abdomen xray  CT scan: CT ABDOMEN PELVIS WITHOUT CONTRAST:   Results for orders placed or performed during the hospital encounter of 08/13/23   CT Abdomen Pelvis  Without Contrast    Narrative    EXAMINATION:  CT ABDOMEN PELVIS WITHOUT CONTRAST    CLINICAL HISTORY:  Flank pain, kidney stone suspected;    TECHNIQUE:  Low dose axial images, sagittal and coronal reformations were obtained from the lung bases to the pubic symphysis.  Contrast was not administered.    COMPARISON:  None    FINDINGS:  Unenhanced liver and spleen normal size.  Gallbladder distended    Pancreas fatty atrophic    Kidneys without hydronephrosis.  Bilateral circumscribed hypodensities may be cysts not well evaluated unenhanced imaging.  No hydroureter    Urinary bladder obscured by artifact related to hip hardware.    Small and large bowel contains fluid not significantly distended.  No overt pericecal inflammatory change.  Mild stranding in the root of the mesentery.  No fluid collections seen.    No aortic aneurysm      Impression    No obstructive uropathy    Distended gallbladder    Large and small bowel fluid possible ileus      Electronically signed by: Cyn Thacker  Date:    08/14/2023  Time:    00:24    and CTA acute GI bleed     Pending Diagnostic Studies:     Procedure Component Value Units Date/Time    Calprotectin, Stool [[id number]] Collected: 08/14/23 0232    Order Status: Sent Lab Status: In process Updated: 08/14/23 1056    Specimen: Stool     Fecal fat, qualitative [[id number]] Collected: 08/14/23 0231    Order Status: Sent Lab Status: In process Updated: 08/14/23 1056    Specimen: Stool     Pancreatic elastase, fecal [[id number]] Collected: 08/14/23 0230    Order Status: Sent Lab Status: In process Updated: 08/14/23  1531    Specimen: Stool     Stool Exam-Ova,Cysts,Parasites [[id number]] Collected: 08/15/23 1152    Order Status: Sent Lab Status: In process Updated: 08/15/23 2231    Specimen: Stool     Stool Exam-Ova,Cysts,Parasites [[id number]] Collected: 08/14/23 0233    Order Status: Sent Lab Status: In process Updated: 08/14/23 1056    Specimen: Stool          Medications:  Reconciled Home Medications:      Medication List      START taking these medications    cefdinir 300 MG capsule  Commonly known as: OMNICEF  Take 1 capsule (300 mg total) by mouth 2 (two) times daily. for 3 days     dicyclomine 10 MG capsule  Commonly known as: BENTYL  Take 1 capsule (10 mg total) by mouth 2 (two) times daily.     metroNIDAZOLE 500 MG tablet  Commonly known as: FLAGYL  Take 1 tablet (500 mg total) by mouth 3 (three) times daily. for 3 days        CHANGE how you take these medications    carvediloL 6.25 MG tablet  Commonly known as: COREG  Take 1 tablet (6.25 mg total) by mouth 2 (two) times daily with meals.  What changed:   · medication strength  · how much to take        CONTINUE taking these medications    * albuterol 90 mcg/actuation inhaler  Commonly known as: PROVENTIL/VENTOLIN HFA  INHALE TWO PUFFS INTO THE LUNGS EVERY 4 HOURS AS NEEDED     * albuterol 2.5 mg /3 mL (0.083 %) nebulizer solution  Commonly known as: PROVENTIL  Take 2.5 mg by nebulization every 6 (six) hours as needed for Wheezing. Rescue     APTIOM 400 mg Tab tablet  Generic drug: eslicarbazepine  Take 400 mg by mouth.     atorvastatin 40 MG tablet  Commonly known as: LIPITOR  TAKE 1 TABLET BY MOUTH ONCE DAILY     clopidogreL 75 mg tablet  Commonly known as: PLAVIX  Take 1 tablet (75 mg total) by mouth once daily.     DALIRESP 500 mcg Tab  Generic drug: roflumilast  TAKE 1 TABLET BY MOUTH ONCE DAILY     denosumab 60 mg/mL Syrg  Commonly known as: PROLIA  every 6 (six) months.     diltiaZEM 120 MG Cp24  Commonly known as: CARDIZEM CD  Take 2 capsules (240 mg total) by  mouth once daily.     dorzolamide-timolol 2-0.5% 22.3-6.8 mg/mL ophthalmic solution  Commonly known as: COSOPT  Place 1 drop into both eyes every 12 (twelve) hours.     EScitalopram oxalate 20 MG tablet  Commonly known as: LEXAPRO  TAKE 1 TABLET BY MOUTH ONCE DAILY     fluticasone propionate 50 mcg/actuation nasal spray  Commonly known as: FLONASE  USE 2 SPRAYS INTO EACH NOSTRIL ONCE A DAY AT 6AM AS DIRECTED     gabapentin 300 MG capsule  Commonly known as: NEURONTIN  Take 1 capsule (300 mg total) by mouth 3 (three) times daily.     ipratropium 42 mcg (0.06 %) nasal spray  Commonly known as: ATROVENT  USE 2 SPRAYS INTO EACH NOSTRIL FOUR TIMES DAILY     ketoconazole 2 % cream  Commonly known as: NIZORAL  Apply topically 2 (two) times daily. For dry flaky patches of ear.     latanoprost 0.005 % ophthalmic solution  Place 1 drop into both eyes every evening.     levETIRAcetam 500 MG Tab  Commonly known as: KEPPRA  Take 1 tablet (500 mg total) by mouth 2 (two) times daily.     magnesium oxide 400 mg (241.3 mg magnesium) tablet  Commonly known as: MAG-OX  Take 1 tablet (400 mg total) by mouth once daily.     montelukast 10 mg tablet  Commonly known as: SINGULAIR  TAKE 1 TABLET BY MOUTH ONCE A DAY     MYRBETRIQ 50 mg Tb24  Generic drug: mirabegron  TAKE 1 TABLET BY MOUTH ONCE DAILY     nabumetone 500 MG tablet  Commonly known as: RELAFEN  Take 1 tablet (500 mg total) by mouth 2 (two) times daily.     nortriptyline 25 MG capsule  Commonly known as: PAMELOR  Take 1 capsule (25 mg total) by mouth every evening.     omeprazole 40 MG capsule  Commonly known as: PRILOSEC  TAKE 1 CAPSULE BY MOUTH ONCE DAILY IN THE MORNING     potassium chloride SA 20 MEQ tablet  Commonly known as: K-DUR,KLOR-CON  Take 1 tablet (20 mEq total) by mouth once daily. Take extra dose with Lasix - fluid pill     ranolazine 1,000 mg Tb12  Commonly known as: RANEXA  TAKE 1 TABLET BY MOUTH TWICE DAILY     TRELEGY ELLIPTA 200-62.5-25 mcg inhaler  Generic  drug: fluticasone-umeclidin-vilanter  INHALE 1 PUFF BY MOUTH ONCE A DAY     triamcinolone acetonide 0.025% 0.025 % cream  Commonly known as: KENALOG  Apply topically 2 (two) times daily. PRN rash and itching of ear. Mild steroid cream.         * This list has 2 medication(s) that are the same as other medications prescribed for you. Read the directions carefully, and ask your doctor or other care provider to review them with you.            STOP taking these medications    amLODIPine 10 MG tablet  Commonly known as: NORVASC     metFORMIN 500 MG tablet  Commonly known as: GLUCOPHAGE     tiZANidine 4 MG tablet  Commonly known as: ZANAFLEX     valsartan 320 MG tablet  Commonly known as: DIOVAN        ASK your doctor about these medications    cholecalciferol (vitamin D3) 1,250 mcg (50,000 unit) capsule  Take 1 capsule (50,000 Units total) by mouth every 7 days.     isosorbide mononitrate 60 MG 24 hr tablet  Commonly known as: IMDUR  Take 1 tablet (60 mg total) by mouth every evening.     nitroGLYCERIN 0.4 MG SL tablet  Commonly known as: NITROSTAT  DISSOLVE 1 TABLET UNDER TONGUE EVERY 5 MINUTES FOR CHEST PAIN (MAY TAKE UP TO 3 DOSES THEN SEEK MEDICAL ATTENTION)            Indwelling Lines/Drains at time of discharge:   Lines/Drains/Airways     None                 Time spent on the discharge of patient: 57 minutes         Sandra Geller MD  Department of Hospital Medicine  [facility] Tele[facility] ([facility])

## 2023-08-16 NOTE — SUBJECTIVE & OBJECTIVE
Interval History: See hospital course for today      Review of Systems   Constitutional:  Negative for activity change, appetite change, fatigue and fever.   Respiratory:  Negative for shortness of breath.    Cardiovascular:  Negative for chest pain and leg swelling.   Gastrointestinal:  Positive for abdominal pain and diarrhea (improving). Negative for constipation, nausea and vomiting.   Musculoskeletal:  Positive for gait problem.   Neurological:  Negative for seizures and weakness.   Psychiatric/Behavioral:  Negative for agitation, behavioral problems, confusion, decreased concentration and dysphoric mood. The patient is nervous/anxious.      Objective:     Vital Signs (Most Recent):  Temp: 98.3 °F (36.8 °C) (08/16/23 0706)  Pulse: 74 (08/16/23 0725)  Resp: 20 (08/16/23 0725)  BP: (!) 112/53 (08/16/23 0706)  SpO2: 98 % (08/16/23 0725) Vital Signs (24h Range):  Temp:  [97.7 °F (36.5 °C)-98.9 °F (37.2 °C)] 98.3 °F (36.8 °C)  Pulse:  [71-99] 74  Resp:  [16-20] 20  SpO2:  [95 %-100 %] 98 %  BP: (108-138)/(51-65) 112/53     Weight: 53 kg (116 lb 13.5 oz)  Body mass index is 20.06 kg/m².    Intake/Output Summary (Last 24 hours) at 8/16/2023 1211  Last data filed at 8/15/2023 1256  Gross per 24 hour   Intake 360 ml   Output --   Net 360 ml         Physical Exam  Vitals and nursing note reviewed. Exam conducted with a chaperone present (nursing).   Constitutional:       General: She is not in acute distress.     Appearance: She is ill-appearing. She is not toxic-appearing.   HENT:      Head: Normocephalic and atraumatic.   Cardiovascular:      Rate and Rhythm: Normal rate.   Pulmonary:      Effort: Pulmonary effort is normal. No respiratory distress.   Abdominal:      Palpations: Abdomen is soft.      Tenderness: There is no abdominal tenderness.   Musculoskeletal:      Right lower leg: No edema.      Left lower leg: No edema.   Skin:     General: Skin is warm and dry.   Neurological:      Mental Status: She is alert  and oriented to person, place, and time.      Gait: Gait abnormal (unsteady).   Psychiatric:         Mood and Affect: Mood is anxious.         Speech: Speech normal.             Significant Labs: All pertinent labs within the past 24 hours have been reviewed.  CBC:   Recent Labs   Lab 08/15/23  0512 08/16/23  0516   WBC 6.61 5.01   HGB 11.4* 9.6*   HCT 34.5* 28.9*    148*     CMP:   Recent Labs   Lab 08/15/23  0512 08/16/23  0516   * 136   K 4.3 3.3*    113*   CO2 12* 14*   GLU 78 102   BUN 13 9   CREATININE 0.9 0.8   CALCIUM 7.7* 7.0*   ANIONGAP 12 9       Significant Imaging: I have reviewed all pertinent imaging results/findings within the past 24 hours.  CT: I have reviewed all pertinent results/findings within the past 24 hours and my personal findings are:  GI bleed scan pending

## 2023-08-16 NOTE — ASSESSMENT & PLAN NOTE
--reflux and/or gastric ulcer on ddx for presenting symptoms  --will give a 1x dose of IV PPI 80 mg   --restart home PPI but at twice a day  --monitor clinically    Continue proton pump inhibitor bid

## 2023-08-16 NOTE — PLAN OF CARE
Problem: Adult Inpatient Plan of Care  Goal: Plan of Care Review  Outcome: Ongoing, Progressing     Problem: Diabetes Comorbidity  Goal: Blood Glucose Level Within Targeted Range  Outcome: Ongoing, Progressing     Problem: Renal Function Impairment (Acute Kidney Injury/Impairment)  Goal: Effective Renal Function  Outcome: Ongoing, Progressing     Problem: Skin Injury Risk Increased  Goal: Skin Health and Integrity  Outcome: Ongoing, Progressing     Problem: Fall Injury Risk  Goal: Absence of Fall and Fall-Related Injury  Outcome: Ongoing, Progressing

## 2023-08-16 NOTE — DISCHARGE INSTRUCTIONS
You have been started on new medication(s) from this hospitalization. Please take as directed and schedule hospital follow up appointment with your primary providers.    Homehealth with physical/occupational therapy order has been ordered. Someone will be in contact.    A nurse practitioner may be contacting you to assess your status post-hospitalization.     A referral has been placed on your behalf for gastroenterology.

## 2023-08-16 NOTE — PT/OT/SLP PROGRESS
"Occupational Therapy   Treatment    Name: Shireen Felix  MRN: [id number]  Admitting Diagnosis:  HARESH (acute kidney injury)       Recommendations:     Discharge Recommendations: home health OT (with 24/7 spv)  Discharge Equipment Recommendations:  walker, rolling  Barriers to discharge:  Decreased caregiver support    Assessment:     Shireen Felix is a 77 y.o. female with a medical diagnosis of HARESH (acute kidney injury).  She presents with the following performance deficits affecting function are weakness, impaired endurance, impaired self care skills, impaired functional mobility, gait instability, impaired balance, decreased coordination, decreased upper extremity function, decreased lower extremity function, decreased safety awareness, decreased ROM, impaired cardiopulmonary response to activity.     Rehab Prognosis:  Good; patient would benefit from acute skilled OT services to address these deficits and reach maximum level of function.       Plan:     Patient to be seen 2 x/week to address the above listed problems via self-care/home management, therapeutic exercises, therapeutic activities  Plan of Care Expires: 08/28/23  Plan of Care Reviewed with: patient    Subjective     Chief Complaint: SOB, lightheadedness with ambulation "I'm not feeling like myself."  Patient/Family Comments/goals: get better  Pain/Comfort:  Pain Rating 1: 0/10    Objective:     Communicated with: Nurse and epic chart review prior to session.  Patient found supine with telemetry, peripheral IV upon OT entry to room.    General Precautions: Standard, fall, seizure    Orthopedic Precautions:N/A  Braces: N/A  Respiratory Status: Room air     Occupational Performance:     Bed Mobility:    Patient completed Rolling/Turning to Right with modified independence  Patient completed Scooting/Bridging with modified independence  Patient completed Supine to Sit with modified independence     Functional Mobility/Transfers:  Patient completed " Sit <> Stand Transfer with stand by assistance  with  no assistive device   Patient completed Bed <> Chair Transfer using Stand Pivot technique with stand by assistance with rolling walker  Functional Mobility: Patient completed x20ft x4 reps functional mobility with SBA and RW to increase dynamic standing balance and activity tolerance needed for ADL completion. Pt reporting increased SOB, requiring multiple standing rest breaks.     [facility] 6 Click ADL: 22    Treatment & Education:  Pt reporting increased SOB and lightheadedness after ambulating, improved with rest. Pt's /53 and SpO2 98% sitting in chair after ambulating. Educated pt on pursed lip breathing technique and importance of activity pacing.  Pt completed x15 reps BUE AROM therex in chair:  Shoulder flexion  Elbow flexion/ext  Digit flexion/ext  Pt fatiguing quickly. Reviewed role of OT in acute setting and benefits of participation. Educated on techniques to use to increase independence and decrease fall risk with functional transfers. Educated on importance of OOB activity and calling for A to transfer back to bed. Encouraged completion of B UE AROM therex throughout the day to tolerance to increase functional strength and activity tolerance. Educated patient on importance of increased tolerance to upright position and direct impact on CV endurance and strength. Patient encouraged to sit up in chair for a minimum of 2 consecutive hours per day. Patient stated understanding and in agreement with POC.     Patient left up in chair with all lines intact, call button in reach, and chair alarm on    GOALS:   Multidisciplinary Problems       Occupational Therapy Goals          Problem: Occupational Therapy    Goal Priority Disciplines Outcome Interventions   Occupational Therapy Goal     OT, PT/OT Ongoing, Progressing    Description: Goals to be met by: 8/28/23     Patient will increase functional independence with ADLs by performing:    Grooming while  standing at sink with Modified [place].  Toileting from toilet with Set-up Assistance for hygiene and clothing management.   Toilet transfer to toilet with Modified [place] with RW.  Upper extremity exercise program x15 reps per handout, with independence.                         Time Tracking:     OT Date of Treatment: 08/16/23  OT Start Time: 1045  OT Stop Time: 1110  OT Total Time (min): 25 min    Billable Minutes:Therapeutic Activity 15  Therapeutic Exercise 10    OT/CORINA: OT      Keesha Grimm OT     8/16/2023

## 2023-08-16 NOTE — ASSESSMENT & PLAN NOTE
--odd presentation, no obvious risk factors  --stool studies ordered, of which includes c. Diff- follow up results  --gentle IVFs for hydration    cdiff negative  Shiga toxin negative   Lomotil prn  Stool ova and parasite pending  Consider gastroenterology consult

## 2023-08-16 NOTE — PT/OT/SLP PROGRESS
"Physical Therapy  Treatment    Shireen Felix   MRN: [id number]   Admitting Diagnosis: HARESH (acute kidney injury)    PT Received On: 08/16/23  PT Start Time: 1045     PT Stop Time: 1110    PT Total Time (min): 25 min       Billable Minutes:  Gait Training 10 and Therapeutic Activity 15    Treatment Type: Treatment  PT/PTA: PTA     Number of PTA visits since last PT visit: 2       General Precautions: Standard, fall, seizure  Orthopedic Precautions: N/A  Braces: N/A  Respiratory Status: Room air         Subjective:  Communicated with patient's nurse, Danna, and completed Epic chart review prior to session.  Patient agreed to PT session.     Pain/Comfort  Pain Rating 1: 0/10  Pain Rating Post-Intervention 1: 0/10    Objective:   Patient found with: telemetry, peripheral IV    Supine > sit EOB: Modified Independent    Forward scoot towards EOB: Modified Independent    STS from EOB > RW: SBA (VC for hand placement)    20ft x4 trials SBA (multiple standing rest breaks throughout)    Completed x10 reps AROM TE to BLE: LAQ, Hip Flex, AP   Intermittent cues given as needed to maintain correct form during repetitions     Patient began to c/o feeling light headed and "feel funny". Vitals assessed and found to be WNL.  BP: 108/53 mmHg  SpO2: 98%   HR: 75bpm    Symptoms improved with rest.     Educated patient on importance of increased tolerance to upright position and direct impact on CV endurance and strength. Patient encouraged to sit up in chair/ EOB, for a minimum of 2 consecutive hours, 3x per day. Encouraged patient to perform AROM TE to BLE throughout the day within all available planes of motion. Re enforced importance of utilizing call light to meet needs in room and not attempt to get up without staff assistance. Patient verbalized understanding and agreed to comply.      AM-PAC 6 CLICK MOBILITY  How much help from another person does this patient currently need?   1 = Unable, Total/Dependent Assistance  2 = A " lot, Maximum/Moderate Assistance  3 = A little, Minimum/Contact Guard/Supervision  4 = None, Modified [place]/Independent    Turning over in bed (including adjusting bedclothes, sheets and blankets)?: 4  Sitting down on and standing up from a chair with arms (e.g., wheelchair, bedside commode, etc.): 4  Moving from lying on back to sitting on the side of the bed?: 4  Moving to and from a bed to a chair (including a wheelchair)?: 4  Need to walk in hospital room?: 4  Climbing 3-5 steps with a railing?: 1 (NT)  Basic Mobility Total Score: 21    AM-PAC Raw Score CMS G-Code Modifier Level of Impairment Assistance   6 % Total / Unable   7 - 9 CM 80 - 100% Maximal Assist   10 - 14 CL 60 - 80% Moderate Assist   15 - 19 CK 40 - 60% Moderate Assist   20 - 22 CJ 20 - 40% Minimal Assist   23 CI 1-20% SBA / CGA   24 CH 0% Independent/ Mod I     Patient left up in chair with call button in reach and chair alarm on.    Assessment:  Shireen Felix is a 77 y.o. female with a medical diagnosis of HARESH (acute kidney injury) and presents with overall decline in functional mobility. Patient would continue to benefit from skilled PT to address functional limitations listed below in order to return to PLOF/decrease caregiver burden. Patient demonstrated a reduced level of CV endurance and activity tolerance today compared to previous session. She required much more frequent rest breaks between trials of gait and rested for longer periods. Symptoms patient reported resolved quickly with rest.     Rehab identified problem list/impairments: weakness, impaired endurance, impaired self care skills, impaired functional mobility, gait instability, impaired balance, decreased coordination, decreased upper extremity function, decreased lower extremity function, decreased safety awareness, decreased ROM    Rehab potential is good.    Activity tolerance: Fair    Discharge recommendations: home health PT (WITH 24 HOUR SPV)       Barriers to discharge:      Equipment recommendations: walker, rolling     GOALS:   Multidisciplinary Problems       Physical Therapy Goals          Problem: Physical Therapy    Goal Priority Disciplines Outcome Goal Variances Interventions   Physical Therapy Goal     PT, PT/OT      Description: Goals to be met by 8/28/23.  1. Pt will complete bed mobility MOD I.  2. Pt will complete sit to stand MOD I.  3. Pt will ambulate 200ft MOD I using RW.  4. Pt will increase AMPAC score by 2 points to progress functional mobility.                         PLAN:    Patient to be seen 3 x/week to address the above listed problems via gait training, therapeutic activities, therapeutic exercises  Plan of Care expires: 08/28/23  Plan of Care reviewed with: patient         08/16/2023

## 2023-08-16 NOTE — ASSESSMENT & PLAN NOTE
--stable, no active chest pain at this time, however, since ACS can sometimes present as nausea and vomiting   --will order troponin x2 to screen  --continue home statin and antiplatelet therapy  --monitor clinically, PRN EKG    Continue plavix and statin

## 2023-08-16 NOTE — PLAN OF CARE
Problem: Adult Inpatient Plan of Care  Goal: Plan of Care Review  Outcome: Met  Goal: Patient-Specific Goal (Individualized)  Outcome: Met  Goal: Absence of Hospital-Acquired Illness or Injury  Outcome: Met  Goal: Optimal Comfort and Wellbeing  Outcome: Met  Goal: Readiness for Transition of Care  Outcome: Met     Problem: Diabetes Comorbidity  Goal: Blood Glucose Level Within Targeted Range  Outcome: Met     Problem: Fluid and Electrolyte Imbalance (Acute Kidney Injury/Impairment)  Goal: Fluid and Electrolyte Balance  Outcome: Met     Problem: Oral Intake Inadequate (Acute Kidney Injury/Impairment)  Goal: Optimal Nutrition Intake  Outcome: Met     Problem: Renal Function Impairment (Acute Kidney Injury/Impairment)  Goal: Effective Renal Function  Outcome: Met     Problem: Infection  Goal: Absence of Infection Signs and Symptoms  Outcome: Met     Problem: Skin Injury Risk Increased  Goal: Skin Health and Integrity  Outcome: Met     Problem: Electrolyte Imbalance  Goal: Electrolyte Balance  Outcome: Met     Problem: Fall Injury Risk  Goal: Absence of Fall and Fall-Related Injury  Outcome: Met

## 2023-08-16 NOTE — PROGRESS NOTES
[facility] Emergency Medicine  Progress Note    Patient Name: Shireen Felix  MRN: [id number]  Patient Class: OP- Observation   Admission Date: 8/13/2023  Length of Stay: 0 days  Attending Physician: Sandra Geller MD  Primary Care Provider: Laron Chaudhari MD        Subjective:     Principal Problem:HARESH (acute kidney injury)        HPI:  Patient is a 77-year-old woman with a past medical history of COPD, non-insulin-dependent type 2 diabetes mellitus, CAD, essential hypertension with dyslipidemia, history of stroke, GERD, and glaucoma who presented to the ER with intractable nausea with NBNB vomiting.  Onset of symptoms 3-4 days prior to admission, associated with epigastric abdominal pain and frequent nonbloody diarrhea (3-4 episodes a day).  Patient reports symptoms were mild at 1st but soon progressed to the point where she was unable to tolerate any food and was becoming weaker and weaker.  This is what prompted her to come into the ER for evaluation.  No prior history of similar symptoms, no new medication changes, no known sick contacts, no changes in diet, no fevers, chills, chest pain, shortness of breath or skin rashes.    In the ER, patient's vital signs were grossly within normal limits and she remained hemodynamically stable since arrival.  Routine labs showed several abnormalities, including a abnormal urinalysis suggestive of UTI, a mildly elevated procalcitonin of 0.3, hypomagnesemia of 1.2, acute kidney injury with Cr of 1.5  (baseline approximately 0.6), hypokalemia of 2.8, CO2 of 14 and a hypoalbuminemia of 2.9.  Pertinent negatives include a normal lactic acid was 0.7, normal lipase, normal WBC.  No imaging obtained in the ER for review.  Hospital Medicine called for admission.      Overview/Hospital Course:  8/14 admitted for acute kidney injury, which has resolved. Urinalysis positive. Receiving intravenous antibiotic(s). Complaining of diarrhea and dysuria. Cdiff  negative. Denies recurrent utis. Follows urology outpatient for incontinence. Cva tenderness but imaging unremarkable  8/15 continues to complains of diarrhea and concerns of worms. Stool ova and parasite pending. Urine culture unremarkable. Discontinue rocephin. Physical/occupational therapy recommending homehealth physical/occupational therapy.   8/16 hb/hct downtrending. Diarrhea improving. Cta GI bleed pending. Pmh polyps on cscope approximately 5 years ago and failed to follow up. Consider gastroenterology consult pending imaging      Interval History: See hospital course for today      Review of Systems   Constitutional:  Negative for activity change, appetite change, fatigue and fever.   Respiratory:  Negative for shortness of breath.    Cardiovascular:  Negative for chest pain and leg swelling.   Gastrointestinal:  Positive for abdominal pain and diarrhea (improving). Negative for constipation, nausea and vomiting.   Musculoskeletal:  Positive for gait problem.   Neurological:  Negative for seizures and weakness.   Psychiatric/Behavioral:  Negative for agitation, behavioral problems, confusion, decreased concentration and dysphoric mood. The patient is nervous/anxious.      Objective:     Vital Signs (Most Recent):  Temp: 98.3 °F (36.8 °C) (08/16/23 0706)  Pulse: 74 (08/16/23 0725)  Resp: 20 (08/16/23 0725)  BP: (!) 112/53 (08/16/23 0706)  SpO2: 98 % (08/16/23 0725) Vital Signs (24h Range):  Temp:  [97.7 °F (36.5 °C)-98.9 °F (37.2 °C)] 98.3 °F (36.8 °C)  Pulse:  [71-99] 74  Resp:  [16-20] 20  SpO2:  [95 %-100 %] 98 %  BP: (108-138)/(51-65) 112/53     Weight: 53 kg (116 lb 13.5 oz)  Body mass index is 20.06 kg/m².    Intake/Output Summary (Last 24 hours) at 8/16/2023 1211  Last data filed at 8/15/2023 1256  Gross per 24 hour   Intake 360 ml   Output --   Net 360 ml         Physical Exam  Vitals and nursing note reviewed. Exam conducted with a chaperone present (nursing).   Constitutional:       General: She  is not in acute distress.     Appearance: She is ill-appearing. She is not toxic-appearing.   HENT:      Head: Normocephalic and atraumatic.   Cardiovascular:      Rate and Rhythm: Normal rate.   Pulmonary:      Effort: Pulmonary effort is normal. No respiratory distress.   Abdominal:      Palpations: Abdomen is soft.      Tenderness: There is no abdominal tenderness.   Musculoskeletal:      Right lower leg: No edema.      Left lower leg: No edema.   Skin:     General: Skin is warm and dry.   Neurological:      Mental Status: She is alert and oriented to person, place, and time.      Gait: Gait abnormal (unsteady).   Psychiatric:         Mood and Affect: Mood is anxious.         Speech: Speech normal.             Significant Labs: All pertinent labs within the past 24 hours have been reviewed.  CBC:   Recent Labs   Lab 08/15/23  0512 08/16/23  0516   WBC 6.61 5.01   HGB 11.4* 9.6*   HCT 34.5* 28.9*    148*     CMP:   Recent Labs   Lab 08/15/23  0512 08/16/23  0516   * 136   K 4.3 3.3*    113*   CO2 12* 14*   GLU 78 102   BUN 13 9   CREATININE 0.9 0.8   CALCIUM 7.7* 7.0*   ANIONGAP 12 9       Significant Imaging: I have reviewed all pertinent imaging results/findings within the past 24 hours.  CT: I have reviewed all pertinent results/findings within the past 24 hours and my personal findings are:  GI bleed scan pending      Assessment/Plan:      Acute anemia  Several diarrheal episodes, dark   cta pending          Vaginal atrophy  Topical estrace   Patient refused     Acute diarrhea  --odd presentation, no obvious risk factors  --stool studies ordered, of which includes c. Diff- follow up results  --gentle IVFs for hydration    cdiff negative  Shiga toxin negative   Lomotil prn  Stool ova and parasite pending  Consider gastroenterology consult    Intractable vomiting with nausea  Etiology unknown. Patient could not recall any risk factors at the time of our encounter. Ddx will default to the most  common etiologis: Gastritis, ulcer, reflux, esophagitis, pancreatitis, SBO/Ileus (low suspicion)   --more controlled at the time of our encounter  --maintain on PRN IV therapy for now, modify as necessary  --Lipase and LFTs are normal  --abdominal XR ordered/pending  --procalcitonin mildly elevated- will add on Flagyl to Rocephin regimen to cover for intraabdominal infxn  --IV PPI 80 mg x1 dose, restart home PPI regimen thereafter  --monitor clinically  --gentle IVFs, CLD, advance slowly  --if persists, consider abdominal CT to further assess    Will advance to low residual diet    Acute cystitis without hematuria  --urinalysis results suggestive of infection  --no prior culture data available for review  --s/p IV Rocephin in the ER- continue upon admission  --follow urine and blood cultures  --monitor fever curve- Tylenol PRN    Urine culture with multiple organism in predominance  Discontinue intravenous rocephin      Chronic diastolic heart failure  --seemingly stable  --Last ECHO from JUNE 2023 confirm EF 50 %  --no signs of fluid overload on exam   --monitor electrolytes, UOP  --strict I&O and daily weights ordered        Seizure  --controlled, hasn't had episode in years  --continue home regimen  --seizure precautions      Coronary artery disease of native artery of native heart with stable angina pectoris  --stable, no active chest pain at this time, however, since ACS can sometimes present as nausea and vomiting   --will order troponin x2 to screen  --continue home statin and antiplatelet therapy  --monitor clinically, PRN EKG    Continue plavix and statin    DM (diabetes mellitus) type II controlled with renal manifestation  Lab Results   Component Value Date    HGBA1C 5.1 10/26/2022   --controlled  --Home medications include metformin- held upon admission  --low dose SSI ordered  --Accuchecks ACQ  --Fasting AM glucose goal <140      HTN (hypertension)  Blood pressure low-normal range, likely d/t volume  depletion caused by intractable nausea and vomiting  --home medications include: norvasc, coreg, cardizem, valsartan, and imdur  --hold valsartan for HARESH  --hold imdur given low-normal blood pressure on admission  --restart norvasc, cardizem, and coreg  --PRN IV hydralazine 10mg Q6H for sBP > 175 mmHg for breakthrough  --[id number]    Remains hypotensive  Continue de-escalating antihtn medication(s)   Lower coreg due to unsteady gait and history of falls    GERD (gastroesophageal reflux disease)  --reflux and/or gastric ulcer on ddx for presenting symptoms  --will give a 1x dose of IV PPI 80 mg   --restart home PPI but at twice a day  --monitor clinically    Continue proton pump inhibitor bid       VTE Risk Mitigation (From admission, onward)         Ordered     IP VTE HIGH RISK PATIENT  Once         08/13/23 1315     Place sequential compression device  Until discontinued         08/13/23 1315                Discharge Planning   KATHERINE: 8/16/2023     Code Status: Full Code   Is the patient medically ready for discharge?:     Reason for patient still in hospital (select all that apply): Patient new problem, Patient trending condition, Laboratory test, Treatment, Imaging and Consult recommendations  Discharge Plan A: Home, Home Health                  Sandra Geller MD  Department of Hospital Medicine   [facility] - Telemetry ([facility])

## 2023-08-17 ENCOUNTER — PES CALL (OUTPATIENT)
Dept: ADMINISTRATIVE | Facility: CLINIC | Age: 78
End: 2023-08-17
Payer: MEDICARE

## 2023-08-17 LAB
BACTERIA STL CULT: ABNORMAL
BACTERIA STL CULT: ABNORMAL
FAT STL QL: NORMAL
NEUTRAL FAT STL QL: NORMAL

## 2023-08-18 ENCOUNTER — OFFICE VISIT (OUTPATIENT)
Dept: GASTROENTEROLOGY | Facility: CLINIC | Age: 78
End: 2023-08-18
Payer: MEDICARE

## 2023-08-18 ENCOUNTER — LAB VISIT (OUTPATIENT)
Dept: LAB | Facility: HOSPITAL | Age: 78
End: 2023-08-18
Attending: NURSE PRACTITIONER
Payer: MEDICARE

## 2023-08-18 VITALS
HEART RATE: 88 BPM | SYSTOLIC BLOOD PRESSURE: 130 MMHG | HEIGHT: 64 IN | BODY MASS INDEX: 19.68 KG/M2 | WEIGHT: 115.31 LBS | DIASTOLIC BLOOD PRESSURE: 76 MMHG

## 2023-08-18 DIAGNOSIS — D64.9 ACUTE ANEMIA: ICD-10-CM

## 2023-08-18 DIAGNOSIS — A09 INFECTIOUS DIARRHEA: ICD-10-CM

## 2023-08-18 DIAGNOSIS — A02.9 SALMONELLA: Primary | ICD-10-CM

## 2023-08-18 DIAGNOSIS — R11.0 NAUSEA: ICD-10-CM

## 2023-08-18 DIAGNOSIS — A02.9 SALMONELLA: ICD-10-CM

## 2023-08-18 LAB
ALBUMIN SERPL BCP-MCNC: 2.8 G/DL (ref 3.5–5.2)
ALP SERPL-CCNC: 58 U/L (ref 55–135)
ALT SERPL W/O P-5'-P-CCNC: 58 U/L (ref 10–44)
ANION GAP SERPL CALC-SCNC: 10 MMOL/L (ref 8–16)
AST SERPL-CCNC: 62 U/L (ref 10–40)
BACTERIA BLD CULT: NORMAL
BACTERIA BLD CULT: NORMAL
BASOPHILS # BLD AUTO: 0.02 K/UL (ref 0–0.2)
BASOPHILS NFR BLD: 0.3 % (ref 0–1.9)
BILIRUB SERPL-MCNC: 0.5 MG/DL (ref 0.1–1)
BUN SERPL-MCNC: 7 MG/DL (ref 8–23)
CALCIUM SERPL-MCNC: 8.5 MG/DL (ref 8.7–10.5)
CHLORIDE SERPL-SCNC: 110 MMOL/L (ref 95–110)
CO2 SERPL-SCNC: 19 MMOL/L (ref 23–29)
CREAT SERPL-MCNC: 0.7 MG/DL (ref 0.5–1.4)
DIFFERENTIAL METHOD: ABNORMAL
EOSINOPHIL # BLD AUTO: 0.1 K/UL (ref 0–0.5)
EOSINOPHIL NFR BLD: 1.9 % (ref 0–8)
ERYTHROCYTE [DISTWIDTH] IN BLOOD BY AUTOMATED COUNT: 15.7 % (ref 11.5–14.5)
EST. GFR  (NO RACE VARIABLE): >60 ML/MIN/1.73 M^2
GLUCOSE SERPL-MCNC: 105 MG/DL (ref 70–110)
HCT VFR BLD AUTO: 31.2 % (ref 37–48.5)
HGB BLD-MCNC: 10.4 G/DL (ref 12–16)
IMM GRANULOCYTES # BLD AUTO: 0.07 K/UL (ref 0–0.04)
IMM GRANULOCYTES NFR BLD AUTO: 1 % (ref 0–0.5)
LYMPHOCYTES # BLD AUTO: 1.1 K/UL (ref 1–4.8)
LYMPHOCYTES NFR BLD: 16.5 % (ref 18–48)
MAGNESIUM SERPL-MCNC: 1.4 MG/DL (ref 1.6–2.6)
MCH RBC QN AUTO: 29.5 PG (ref 27–31)
MCHC RBC AUTO-ENTMCNC: 33.3 G/DL (ref 32–36)
MCV RBC AUTO: 89 FL (ref 82–98)
MONOCYTES # BLD AUTO: 0.6 K/UL (ref 0.3–1)
MONOCYTES NFR BLD: 9.1 % (ref 4–15)
NEUTROPHILS # BLD AUTO: 4.7 K/UL (ref 1.8–7.7)
NEUTROPHILS NFR BLD: 71.2 % (ref 38–73)
NRBC BLD-RTO: 0 /100 WBC
PLATELET # BLD AUTO: 201 K/UL (ref 150–450)
PMV BLD AUTO: 9.6 FL (ref 9.2–12.9)
POTASSIUM SERPL-SCNC: 3.7 MMOL/L (ref 3.5–5.1)
PROT SERPL-MCNC: 6.7 G/DL (ref 6–8.4)
RBC # BLD AUTO: 3.52 M/UL (ref 4–5.4)
SODIUM SERPL-SCNC: 139 MMOL/L (ref 136–145)
WBC # BLD AUTO: 6.67 K/UL (ref 3.9–12.7)

## 2023-08-18 PROCEDURE — 99999 PR PBB SHADOW E&M-EST. PATIENT-LVL V: ICD-10-PCS | Mod: PBBFAC,,, | Performed by: NURSE PRACTITIONER

## 2023-08-18 PROCEDURE — 99214 OFFICE O/P EST MOD 30 MIN: CPT | Mod: S$GLB,,, | Performed by: NURSE PRACTITIONER

## 2023-08-18 PROCEDURE — 83735 ASSAY OF MAGNESIUM: CPT | Performed by: NURSE PRACTITIONER

## 2023-08-18 PROCEDURE — 1100F PR PT FALLS ASSESS DOC 2+ FALLS/FALL W/INJURY/YR: ICD-10-PCS | Mod: CPTII,S$GLB,, | Performed by: NURSE PRACTITIONER

## 2023-08-18 PROCEDURE — 85025 COMPLETE CBC W/AUTO DIFF WBC: CPT | Performed by: NURSE PRACTITIONER

## 2023-08-18 PROCEDURE — 36415 COLL VENOUS BLD VENIPUNCTURE: CPT | Performed by: NURSE PRACTITIONER

## 2023-08-18 PROCEDURE — 99214 PR OFFICE/OUTPT VISIT, EST, LEVL IV, 30-39 MIN: ICD-10-PCS | Mod: S$GLB,,, | Performed by: NURSE PRACTITIONER

## 2023-08-18 PROCEDURE — 1159F MED LIST DOCD IN RCRD: CPT | Mod: CPTII,S$GLB,, | Performed by: NURSE PRACTITIONER

## 2023-08-18 PROCEDURE — 1160F RVW MEDS BY RX/DR IN RCRD: CPT | Mod: CPTII,S$GLB,, | Performed by: NURSE PRACTITIONER

## 2023-08-18 PROCEDURE — 1160F PR REVIEW ALL MEDS BY PRESCRIBER/CLIN PHARMACIST DOCUMENTED: ICD-10-PCS | Mod: CPTII,S$GLB,, | Performed by: NURSE PRACTITIONER

## 2023-08-18 PROCEDURE — 99999 PR PBB SHADOW E&M-EST. PATIENT-LVL V: CPT | Mod: PBBFAC,,, | Performed by: NURSE PRACTITIONER

## 2023-08-18 PROCEDURE — 3288F FALL RISK ASSESSMENT DOCD: CPT | Mod: CPTII,S$GLB,, | Performed by: NURSE PRACTITIONER

## 2023-08-18 PROCEDURE — 3078F DIAST BP <80 MM HG: CPT | Mod: CPTII,S$GLB,, | Performed by: NURSE PRACTITIONER

## 2023-08-18 PROCEDURE — 1100F PTFALLS ASSESS-DOCD GE2>/YR: CPT | Mod: CPTII,S$GLB,, | Performed by: NURSE PRACTITIONER

## 2023-08-18 PROCEDURE — 80053 COMPREHEN METABOLIC PANEL: CPT | Performed by: NURSE PRACTITIONER

## 2023-08-18 PROCEDURE — 3075F PR MOST RECENT SYSTOLIC BLOOD PRESS GE 130-139MM HG: ICD-10-PCS | Mod: CPTII,S$GLB,, | Performed by: NURSE PRACTITIONER

## 2023-08-18 PROCEDURE — 1125F PR PAIN SEVERITY QUANTIFIED, PAIN PRESENT: ICD-10-PCS | Mod: CPTII,S$GLB,, | Performed by: NURSE PRACTITIONER

## 2023-08-18 PROCEDURE — 1125F AMNT PAIN NOTED PAIN PRSNT: CPT | Mod: CPTII,S$GLB,, | Performed by: NURSE PRACTITIONER

## 2023-08-18 PROCEDURE — 3075F SYST BP GE 130 - 139MM HG: CPT | Mod: CPTII,S$GLB,, | Performed by: NURSE PRACTITIONER

## 2023-08-18 PROCEDURE — 1159F PR MEDICATION LIST DOCUMENTED IN MEDICAL RECORD: ICD-10-PCS | Mod: CPTII,S$GLB,, | Performed by: NURSE PRACTITIONER

## 2023-08-18 PROCEDURE — 3078F PR MOST RECENT DIASTOLIC BLOOD PRESSURE < 80 MM HG: ICD-10-PCS | Mod: CPTII,S$GLB,, | Performed by: NURSE PRACTITIONER

## 2023-08-18 PROCEDURE — 3288F PR FALLS RISK ASSESSMENT DOCUMENTED: ICD-10-PCS | Mod: CPTII,S$GLB,, | Performed by: NURSE PRACTITIONER

## 2023-08-18 RX ORDER — ONDANSETRON 4 MG/1
4 TABLET, ORALLY DISINTEGRATING ORAL EVERY 6 HOURS PRN
Qty: 30 TABLET | Refills: 0 | OUTPATIENT
Start: 2023-08-18 | End: 2023-08-20

## 2023-08-18 RX ORDER — CIPROFLOXACIN 500 MG/1
500 TABLET ORAL 2 TIMES DAILY
Qty: 20 TABLET | Refills: 0 | Status: SHIPPED | OUTPATIENT
Start: 2023-08-18 | End: 2023-08-28

## 2023-08-18 RX ORDER — PANTOPRAZOLE SODIUM 40 MG/1
40 TABLET, DELAYED RELEASE ORAL 2 TIMES DAILY
COMMUNITY
Start: 2023-07-19

## 2023-08-18 RX ORDER — FUROSEMIDE 20 MG/1
20 TABLET ORAL DAILY PRN
COMMUNITY
Start: 2023-07-19

## 2023-08-18 RX ORDER — LEVOCETIRIZINE DIHYDROCHLORIDE 5 MG/1
5 TABLET, FILM COATED ORAL 2 TIMES DAILY
COMMUNITY
Start: 2023-07-19 | End: 2023-08-18 | Stop reason: CLARIF

## 2023-08-18 RX ORDER — ONDANSETRON 4 MG/1
4 TABLET, FILM COATED ORAL EVERY 8 HOURS PRN
COMMUNITY
Start: 2023-08-18 | End: 2023-08-18

## 2023-08-18 NOTE — PROGRESS NOTES
Clinic Consult:  [facility] Consultation Note    Reason for Consult:  The primary encounter diagnosis was Salmonella. Diagnoses of Acute anemia, Infectious diarrhea, and Nausea were also pertinent to this visit.    PCP: Laron Chaudhari   [address] / [facility] [id number]    HPI:  This is a 77 y.o. female here for evaluation of infectious diarrhea.    Onset of symptoms started last Thursday-- abdominal pain then developed diarrhea then nausea and vomiting. She went to the ER on Sunday for persistent diarrhea. She was admitted for electrolyte imbalance, HARESH, and anemia. Discharged home on Wednesday. Discharged home on Flagyl and cefdinir.  She continued with intractable diarrhea so went to [facility] ER yesterday.  She received fluids and was discharged home.  She was having 10 + stools per day. Yesterday and today was not as bad as she took Imodium.    Review of Systems   Constitutional:  Positive for malaise/fatigue and weight loss. Negative for fever.   HENT:  Negative for sore throat.    Respiratory:  Negative for cough, shortness of breath and wheezing.    Cardiovascular:  Negative for chest pain and palpitations.   Gastrointestinal:  Positive for abdominal pain, diarrhea, heartburn and nausea. Negative for blood in stool, constipation, melena and vomiting.   Genitourinary:  Negative for dysuria and frequency.   Skin:  Negative for itching and rash.   Neurological:  Positive for weakness. Negative for dizziness, speech change, seizures, loss of consciousness and headaches.       Medical History:  has a past medical history of Abnormal ECG (8/5/2019), Aneurysm, Anticoagulant long-term use, Arthritis, CAD in native artery (8/5/2019), COPD (chronic obstructive pulmonary disease), Diabetes mellitus, Fall (10/10/2019), Glaucoma, Hypertension, Seizures, Shingles (05/27/2017), and Stroke.    Surgical History:  has a past surgical history that includes Brain surgery; Hysterectomy;  Transforaminal epidural injection of steroid (Bilateral, 7/23/2019); Cardiac catheterization; Coronary angioplasty; Epidural steroid injection into lumbar spine (Bilateral, 11/12/2019); Injection of anesthetic agent around medial branch nerves innervating lumbar facet joint (Bilateral, 1/31/2020); Transforaminal epidural injection of steroid (Bilateral, 3/10/2020); Transforaminal epidural injection of steroid (Right, 6/19/2020); Injection of joint (Bilateral, 7/9/2020); Injection of anesthetic agent into sacroiliac joint (Right, 11/16/2021); Selective injection of anesthetic agent around lumbar spinal nerve root by transforaminal approach (Bilateral, 4/26/2023); Injection of joint (Bilateral, 7/28/2023); and Injection of anesthetic agent into sacroiliac joint (Bilateral, 7/28/2023).    Family History: family history includes No Known Problems in her father and mother..     Social History:  reports that she quit smoking about 19 years ago. Her smoking use included cigarettes. She started smoking about 61 years ago. She has a 42.3 pack-year smoking history. She has never used smokeless tobacco. She reports that she does not drink alcohol and does not use drugs.    Allergies: Reviewed    Home Medications:   Current Outpatient Medications on File Prior to Visit   Medication Sig Dispense Refill    albuterol (PROVENTIL) 2.5 mg /3 mL (0.083 %) nebulizer solution Take 2.5 mg by nebulization every 6 (six) hours as needed for Wheezing. Rescue      albuterol (PROVENTIL/VENTOLIN HFA) 90 mcg/actuation inhaler INHALE TWO PUFFS INTO THE LUNGS EVERY 4 HOURS AS NEEDED 18 g 11    APTIOM 400 mg Tab tablet Take 400 mg by mouth.      atorvastatin (LIPITOR) 40 MG tablet TAKE 1 TABLET BY MOUTH ONCE DAILY 90 tablet 1    carvediloL (COREG) 6.25 MG tablet Take 1 tablet (6.25 mg total) by mouth 2 (two) times daily with meals. 60 tablet 0    cefdinir (OMNICEF) 300 MG capsule Take 1 capsule (300 mg total) by mouth 2 (two) times daily. for 3  days 6 capsule 0    cholecalciferol, vitamin D3, 1,250 mcg (50,000 unit) capsule Take 1 capsule (50,000 Units total) by mouth every 7 days. 12 capsule 0    clopidogreL (PLAVIX) 75 mg tablet Take 1 tablet (75 mg total) by mouth once daily. 90 tablet 3    DALIRESP 500 mcg Tab TAKE 1 TABLET BY MOUTH ONCE DAILY 90 tablet 3    denosumab (PROLIA) 60 mg/mL Syrg every 6 (six) months.      dicyclomine (BENTYL) 10 MG capsule Take 1 capsule (10 mg total) by mouth 2 (two) times daily. 10 capsule 0    diltiaZEM (CARDIZEM CD) 120 MG Cp24 Take 2 capsules (240 mg total) by mouth once daily. 180 capsule 1    EScitalopram oxalate (LEXAPRO) 20 MG tablet TAKE 1 TABLET BY MOUTH ONCE DAILY 90 tablet 1    fluticasone propionate (FLONASE) 50 mcg/actuation nasal spray USE 2 SPRAYS INTO EACH NOSTRIL ONCE A DAY AT 6AM AS DIRECTED 16 g 11    fluticasone-umeclidin-vilanter (TRELEGY ELLIPTA) 200-62.5-25 mcg inhaler INHALE 1 PUFF BY MOUTH ONCE A DAY 60 each 11    furosemide (LASIX) 20 MG tablet Take 20 mg by mouth daily as needed.      gabapentin (NEURONTIN) 300 MG capsule Take 1 capsule (300 mg total) by mouth 3 (three) times daily. 90 capsule 1    ipratropium (ATROVENT) 42 mcg (0.06 %) nasal spray USE 2 SPRAYS INTO EACH NOSTRIL FOUR TIMES DAILY 15 mL 11    isosorbide mononitrate (IMDUR) 60 MG 24 hr tablet Take 1 tablet (60 mg total) by mouth every evening. 90 tablet 1    ketoconazole (NIZORAL) 2 % cream Apply topically 2 (two) times daily. For dry flaky patches of ear. 30 g 1    latanoprost 0.005 % ophthalmic solution Place 1 drop into both eyes every evening. 2.5 mL 12    levETIRAcetam (KEPPRA) 500 MG Tab Take 1 tablet (500 mg total) by mouth 2 (two) times daily. 180 tablet 3    magnesium oxide (MAG-OX) 400 mg (241.3 mg magnesium) tablet Take 1 tablet (400 mg total) by mouth once daily. 90 tablet 1    metroNIDAZOLE (FLAGYL) 500 MG tablet Take 1 tablet (500 mg total) by mouth 3 (three) times daily. for 3 days 9 tablet 0    nabumetone (RELAFEN)  "500 MG tablet Take 1 tablet (500 mg total) by mouth 2 (two) times daily. 60 tablet 2    nitroGLYCERIN (NITROSTAT) 0.4 MG SL tablet DISSOLVE 1 TABLET UNDER TONGUE EVERY 5 MINUTES FOR CHEST PAIN (MAY TAKE UP TO 3 DOSES THEN SEEK MEDICAL ATTENTION) 25 tablet 0    pantoprazole (PROTONIX) 40 MG tablet Take 40 mg by mouth 2 (two) times daily.      potassium chloride SA (K-DUR,KLOR-CON) 20 MEQ tablet Take 1 tablet (20 mEq total) by mouth once daily. Take extra dose with Lasix - fluid pill 90 tablet 1    ranolazine (RANEXA) 1,000 mg Tb12 TAKE 1 TABLET BY MOUTH TWICE DAILY 180 tablet 3    [DISCONTINUED] ondansetron (ZOFRAN) 4 MG tablet Take 4 mg by mouth every 8 (eight) hours as needed.      dorzolamide-timolol 2-0.5% (COSOPT) 22.3-6.8 mg/mL ophthalmic solution Place 1 drop into both eyes every 12 (twelve) hours. (Patient not taking: Reported on 8/18/2023) 10 mL 12    montelukast (SINGULAIR) 10 mg tablet TAKE 1 TABLET BY MOUTH ONCE A DAY 90 tablet 3    MYRBETRIQ 50 mg Tb24 TAKE 1 TABLET BY MOUTH ONCE DAILY 30 tablet 0    nortriptyline (PAMELOR) 25 MG capsule Take 1 capsule (25 mg total) by mouth every evening. 90 capsule 0    triamcinolone acetonide 0.025% (KENALOG) 0.025 % cream Apply topically 2 (two) times daily. PRN rash and itching of ear. Mild steroid cream. (Patient not taking: Reported on 8/18/2023) 30 g 0    [DISCONTINUED] levocetirizine (XYZAL) 5 MG tablet Take 5 mg by mouth 2 (two) times daily.      [DISCONTINUED] omeprazole (PRILOSEC) 40 MG capsule TAKE 1 CAPSULE BY MOUTH ONCE DAILY IN THE MORNING 90 capsule 3     No current facility-administered medications on file prior to visit.       Physical Exam:  /76 (BP Location: Left arm, Patient Position: Sitting, BP Method: Medium (Automatic))   Pulse 88   Ht 5' 4" (1.626 m)   Wt 52.3 kg (115 lb 4.8 oz)   LMP  (LMP Unknown)   BMI 19.79 kg/m²   Body mass index is 19.79 kg/m².  Physical Exam  Constitutional:       General: She is not in acute distress.  HENT: "      Head: Normocephalic.   Cardiovascular:      Rate and Rhythm: Normal rate and regular rhythm.      Heart sounds: Murmur heard.   Pulmonary:      Effort: Pulmonary effort is normal.      Breath sounds: Normal breath sounds. No wheezing.   Abdominal:      General: Abdomen is flat. Bowel sounds are normal.      Palpations: Abdomen is soft.      Tenderness: There is no abdominal tenderness.   Neurological:      General: No focal deficit present.      Mental Status: She is alert.   Psychiatric:         Mood and Affect: Mood normal.         Judgment: Judgment normal.         Labs: Pertinent labs reviewed.    Assessment:  1. Salmonella    2. Acute anemia    3. Infectious diarrhea    4. Nausea    Reviewed salmonella culture and sensitivity and showed that is sensitive to Cipro.  She is not have an improvement in diarrhea since discharge from hospital.    Recommendations:   No imodium  Continue flagyl  Stop cefdinir  Start Cipro  ER if symptoms worsen  Stat labs today and Monday.     Salmonella  -     ciprofloxacin HCl (CIPRO) 500 MG tablet; Take 1 tablet (500 mg total) by mouth 2 (two) times daily. for 10 days  Dispense: 20 tablet; Refill: 0  -     Cancel: CBC Auto Differential; Future; Expected date: 08/18/2023  -     Cancel: Comprehensive Metabolic Panel; Future; Expected date: 08/18/2023  -     Cancel: Magnesium; Future; Expected date: 08/18/2023  -     Cancel: Magnesium; Future; Expected date: 08/18/2023  -     CBC Auto Differential; Standing  -     Comprehensive Metabolic Panel; Standing  -     Magnesium; Standing    Acute anemia  -     Ambulatory referral/consult to Gastroenterology    Infectious diarrhea  -     Cancel: CBC Auto Differential; Future; Expected date: 08/18/2023  -     Cancel: Comprehensive Metabolic Panel; Future; Expected date: 08/18/2023  -     Cancel: Magnesium; Future; Expected date: 08/18/2023  -     CBC Auto Differential; Standing  -     Comprehensive Metabolic Panel; Standing  -      Magnesium; Standing    Nausea  -     ondansetron (ZOFRAN-ODT) 4 MG TbDL; Take 1 tablet (4 mg total) by mouth every 6 (six) hours as needed (nausea/vomiting).  Dispense: 30 tablet; Refill: 0        No follow-ups on file.    Thank you so much for allowing me to participate in the care of Shireen VeronicaMAUREEN Castellano

## 2023-08-18 NOTE — PATIENT INSTRUCTIONS
No imodium.   Continue metronidazole (Flagyl).   Discontinue cefdinir (Once).    Start ciprofloxacin (Cipro)  Labs on Monday  If diarrhea worsens or you start to feel worse again, go to the ER.

## 2023-08-20 ENCOUNTER — HOSPITAL ENCOUNTER (EMERGENCY)
Facility: HOSPITAL | Age: 78
Discharge: HOME | End: 2023-08-20
Attending: EMERGENCY MEDICINE
Payer: MEDICARE

## 2023-08-20 VITALS
DIASTOLIC BLOOD PRESSURE: 63 MMHG | HEART RATE: 78 BPM | SYSTOLIC BLOOD PRESSURE: 132 MMHG | BODY MASS INDEX: 18.28 KG/M2 | OXYGEN SATURATION: 98 % | TEMPERATURE: 98 F | WEIGHT: 106.5 LBS | RESPIRATION RATE: 20 BRPM

## 2023-08-20 DIAGNOSIS — I95.9 HYPOTENSION: ICD-10-CM

## 2023-08-20 DIAGNOSIS — R19.7 DIARRHEA, UNSPECIFIED TYPE: Primary | ICD-10-CM

## 2023-08-20 DIAGNOSIS — E86.0 DEHYDRATION: ICD-10-CM

## 2023-08-20 LAB
ALBUMIN SERPL BCP-MCNC: 2.7 G/DL (ref 3.5–5.2)
ALP SERPL-CCNC: 59 U/L (ref 55–135)
ALT SERPL W/O P-5'-P-CCNC: 43 U/L (ref 10–44)
ANION GAP SERPL CALC-SCNC: 11 MMOL/L (ref 8–16)
AST SERPL-CCNC: 35 U/L (ref 10–40)
BACTERIA #/AREA URNS HPF: NORMAL /HPF
BASOPHILS # BLD AUTO: 0.04 K/UL (ref 0–0.2)
BASOPHILS NFR BLD: 0.5 % (ref 0–1.9)
BILIRUB SERPL-MCNC: 0.5 MG/DL (ref 0.1–1)
BILIRUB UR QL STRIP: ABNORMAL
BUN SERPL-MCNC: 9 MG/DL (ref 8–23)
CALCIUM SERPL-MCNC: 8.4 MG/DL (ref 8.7–10.5)
CHLORIDE SERPL-SCNC: 105 MMOL/L (ref 95–110)
CLARITY UR: CLEAR
CO2 SERPL-SCNC: 24 MMOL/L (ref 23–29)
COLOR UR: YELLOW
CREAT SERPL-MCNC: 1 MG/DL (ref 0.5–1.4)
DIFFERENTIAL METHOD: ABNORMAL
EOSINOPHIL # BLD AUTO: 0.3 K/UL (ref 0–0.5)
EOSINOPHIL NFR BLD: 3.7 % (ref 0–8)
ERYTHROCYTE [DISTWIDTH] IN BLOOD BY AUTOMATED COUNT: 15.6 % (ref 11.5–14.5)
EST. GFR  (NO RACE VARIABLE): 58 ML/MIN/1.73 M^2
GLUCOSE SERPL-MCNC: 110 MG/DL (ref 70–110)
GLUCOSE UR QL STRIP: NEGATIVE
HCT VFR BLD AUTO: 31.9 % (ref 37–48.5)
HGB BLD-MCNC: 10.5 G/DL (ref 12–16)
HGB UR QL STRIP: NEGATIVE
HYALINE CASTS #/AREA URNS LPF: 1 /LPF
IMM GRANULOCYTES # BLD AUTO: 0.19 K/UL (ref 0–0.04)
IMM GRANULOCYTES NFR BLD AUTO: 2.1 % (ref 0–0.5)
KETONES UR QL STRIP: ABNORMAL
LACTATE SERPL-SCNC: 0.8 MMOL/L (ref 0.5–2.2)
LACTATE SERPL-SCNC: 0.9 MMOL/L (ref 0.5–2.2)
LEUKOCYTE ESTERASE UR QL STRIP: NEGATIVE
LIPASE SERPL-CCNC: 107 U/L (ref 4–60)
LYMPHOCYTES # BLD AUTO: 1.5 K/UL (ref 1–4.8)
LYMPHOCYTES NFR BLD: 17 % (ref 18–48)
MAGNESIUM SERPL-MCNC: 1.3 MG/DL (ref 1.6–2.6)
MCH RBC QN AUTO: 29.7 PG (ref 27–31)
MCHC RBC AUTO-ENTMCNC: 32.9 G/DL (ref 32–36)
MCV RBC AUTO: 90 FL (ref 82–98)
MICROSCOPIC COMMENT: NORMAL
MONOCYTES # BLD AUTO: 0.8 K/UL (ref 0.3–1)
MONOCYTES NFR BLD: 8.9 % (ref 4–15)
NEUTROPHILS # BLD AUTO: 6 K/UL (ref 1.8–7.7)
NEUTROPHILS NFR BLD: 67.8 % (ref 38–73)
NITRITE UR QL STRIP: POSITIVE
NRBC BLD-RTO: 0 /100 WBC
PH UR STRIP: 5 [PH] (ref 5–8)
PLATELET # BLD AUTO: 248 K/UL (ref 150–450)
PMV BLD AUTO: 9.1 FL (ref 9.2–12.9)
POTASSIUM SERPL-SCNC: 4.2 MMOL/L (ref 3.5–5.1)
PROCALCITONIN SERPL IA-MCNC: 0.04 NG/ML
PROT SERPL-MCNC: 6.6 G/DL (ref 6–8.4)
PROT UR QL STRIP: ABNORMAL
RBC # BLD AUTO: 3.53 M/UL (ref 4–5.4)
RBC #/AREA URNS HPF: 0 /HPF (ref 0–4)
SODIUM SERPL-SCNC: 140 MMOL/L (ref 136–145)
SP GR UR STRIP: >=1.03 (ref 1–1.03)
TROPONIN I SERPL DL<=0.01 NG/ML-MCNC: 0.03 NG/ML (ref 0–0.03)
URN SPEC COLLECT METH UR: ABNORMAL
UROBILINOGEN UR STRIP-ACNC: 1 EU/DL
WBC # BLD AUTO: 8.88 K/UL (ref 3.9–12.7)
WBC #/AREA URNS HPF: 3 /HPF (ref 0–5)

## 2023-08-20 PROCEDURE — 83690 ASSAY OF LIPASE: CPT | Performed by: PHYSICIAN ASSISTANT

## 2023-08-20 PROCEDURE — 83605 ASSAY OF LACTIC ACID: CPT | Performed by: PHYSICIAN ASSISTANT

## 2023-08-20 PROCEDURE — 93005 ELECTROCARDIOGRAM TRACING: CPT

## 2023-08-20 PROCEDURE — 96361 HYDRATE IV INFUSION ADD-ON: CPT

## 2023-08-20 PROCEDURE — 84145 PROCALCITONIN (PCT): CPT | Performed by: PHYSICIAN ASSISTANT

## 2023-08-20 PROCEDURE — 81000 URINALYSIS NONAUTO W/SCOPE: CPT | Performed by: EMERGENCY MEDICINE

## 2023-08-20 PROCEDURE — 25500020 PHARM REV CODE 255: Performed by: EMERGENCY MEDICINE

## 2023-08-20 PROCEDURE — 93010 ELECTROCARDIOGRAM REPORT: CPT | Mod: ,,, | Performed by: INTERNAL MEDICINE

## 2023-08-20 PROCEDURE — 80053 COMPREHEN METABOLIC PANEL: CPT | Performed by: PHYSICIAN ASSISTANT

## 2023-08-20 PROCEDURE — 63600175 PHARM REV CODE 636 W HCPCS: Performed by: EMERGENCY MEDICINE

## 2023-08-20 PROCEDURE — 63600175 PHARM REV CODE 636 W HCPCS: Performed by: PHYSICIAN ASSISTANT

## 2023-08-20 PROCEDURE — 85025 COMPLETE CBC W/AUTO DIFF WBC: CPT | Performed by: PHYSICIAN ASSISTANT

## 2023-08-20 PROCEDURE — 96374 THER/PROPH/DIAG INJ IV PUSH: CPT | Mod: 59

## 2023-08-20 PROCEDURE — 87040 BLOOD CULTURE FOR BACTERIA: CPT | Mod: 59 | Performed by: PHYSICIAN ASSISTANT

## 2023-08-20 PROCEDURE — 96375 TX/PRO/DX INJ NEW DRUG ADDON: CPT

## 2023-08-20 PROCEDURE — 83735 ASSAY OF MAGNESIUM: CPT | Performed by: PHYSICIAN ASSISTANT

## 2023-08-20 PROCEDURE — 84484 ASSAY OF TROPONIN QUANT: CPT | Performed by: PHYSICIAN ASSISTANT

## 2023-08-20 PROCEDURE — 93010 EKG 12-LEAD: ICD-10-PCS | Mod: ,,, | Performed by: INTERNAL MEDICINE

## 2023-08-20 PROCEDURE — 99285 EMERGENCY DEPT VISIT HI MDM: CPT | Mod: 25

## 2023-08-20 RX ORDER — ONDANSETRON 2 MG/ML
4 INJECTION INTRAMUSCULAR; INTRAVENOUS
Status: COMPLETED | OUTPATIENT
Start: 2023-08-20 | End: 2023-08-20

## 2023-08-20 RX ORDER — ONDANSETRON 4 MG/1
4 TABLET, ORALLY DISINTEGRATING ORAL EVERY 6 HOURS PRN
Qty: 30 TABLET | Refills: 0 | Status: SHIPPED | OUTPATIENT
Start: 2023-08-20

## 2023-08-20 RX ORDER — KETOROLAC TROMETHAMINE 30 MG/ML
15 INJECTION, SOLUTION INTRAMUSCULAR; INTRAVENOUS
Status: COMPLETED | OUTPATIENT
Start: 2023-08-20 | End: 2023-08-20

## 2023-08-20 RX ADMIN — IOHEXOL 100 ML: 350 INJECTION, SOLUTION INTRAVENOUS at 02:08

## 2023-08-20 RX ADMIN — ONDANSETRON 4 MG: 2 INJECTION INTRAMUSCULAR; INTRAVENOUS at 11:08

## 2023-08-20 RX ADMIN — SODIUM CHLORIDE, POTASSIUM CHLORIDE, SODIUM LACTATE AND CALCIUM CHLORIDE 1000 ML: 600; 310; 30; 20 INJECTION, SOLUTION INTRAVENOUS at 10:08

## 2023-08-20 RX ADMIN — KETOROLAC TROMETHAMINE 15 MG: 30 INJECTION, SOLUTION INTRAMUSCULAR; INTRAVENOUS at 11:08

## 2023-08-20 NOTE — ED PROVIDER NOTES
SCRIBE #1 NOTE: I, My Parveen, am scribing for, and in the presence of, Kristopher Patel Jr., MD. I have scribed the entire note.       History     Chief Complaint   Patient presents with    Nausea     Reports continued nausea and diarrhea x4 days. Also reports fever     Review of patient's allergies indicates:   Allergen Reactions    Penicillins Anaphylaxis, Hives, Shortness Of Breath and Swelling    Adhesive Rash    Doxycycline Nausea Only    Oxycodone Other (See Comments)     Fatigue      Sulfa (sulfonamide antibiotics) Itching         History of Present Illness     HPI    8/20/2023, 9:53 AM  History obtained from the patient      History of Present Illness: Shireen Felix is a 77 y.o. female patient with a PMHx of HTN, DM, seizure, stroke, and COPD who presents to the Emergency Department for evaluation of N/D which onset x1 week. Pt was discharged with antibiotics (cipro) from [facility] for salmonella on 8/17/23.  She states she has only taken one dose of cipro, last night. Pt also visited GI the day after her discharge. Pt has diarrhea x3 today. Symptoms are constant and moderate in severity. No mitigating or exacerbating factors reported. Associated sxs include fever and generalized abd pain. Patient denies any dysuria, vomiting, and all other sxs at this time. No further complaints or concerns at this time.       Arrival mode: Personal vehicle      PCP: Laron Chaudhari MD        Past Medical History:  Past Medical History:   Diagnosis Date    Abnormal ECG 8/5/2019    Aneurysm     Anticoagulant long-term use     Arthritis     CAD in native artery 8/5/2019    COPD (chronic obstructive pulmonary disease)     Diabetes mellitus     Fall 10/10/2019    Formatting of this note might be different from the original. Found sitting on floor next to bed last night Mild confusion today Does not recall falling or how she ended up on floor UA today Labs yesterday unremarkable    Glaucoma     Hypertension     Seizures      Shingles 05/27/2017    Stroke        Past Surgical History:  Past Surgical History:   Procedure Laterality Date    BRAIN SURGERY      CARDIAC CATHETERIZATION      CORONARY ANGIOPLASTY      EPIDURAL STEROID INJECTION INTO LUMBAR SPINE Bilateral 11/12/2019    Procedure: TF XANDER L4/5;  Surgeon: Desean Dias MD;  Location: [facility] PAIN MGT;  Service: Pain Management;  Laterality: Bilateral;    HYSTERECTOMY      INJECTION OF ANESTHETIC AGENT AROUND MEDIAL BRANCH NERVES INNERVATING LUMBAR FACET JOINT Bilateral 1/31/2020    Procedure: Bilateral L3-5 MBB;  Surgeon: Desean Dias MD;  [facility];  Service: Pain Management;  Laterality: Bilateral;    INJECTION OF ANESTHETIC AGENT INTO SACROILIAC JOINT Right 11/16/2021    Procedure: Right BLOCK, SACROILIAC JOINT Right GTB with RN IV sedation;  Surgeon: Yao Fulton MD;  Location: [facility] PAIN MGT;  Service: Pain Management;  Laterality: Right;    INJECTION OF ANESTHETIC AGENT INTO SACROILIAC JOINT Bilateral 7/28/2023    Procedure: Bilateral GT bursa + bilateral SIJ injection;  Surgeon: Francisco Oshea MD;  Location: [facility] PAIN MGT;  Service: Pain Management;  Laterality: Bilateral;    INJECTION OF JOINT Bilateral 7/9/2020    Procedure: Bilateral shoulder GH Joint injection with local;  Surgeon: Desean Dias MD;  Location: [facility] PAIN MGT;  Service: Pain Management;  Laterality: Bilateral;    INJECTION OF JOINT Bilateral 7/28/2023    Procedure: Bilateral GT bursa + bilateral SIJ injection NEEDS ADDITIONAL SEDATION AND MORE TIME BEFORE INJECTION RN IV Sedation;  Surgeon: Francisco Oshea MD;  Location: [facility] PAIN MGT;  Service: Pain Management;  Laterality: Bilateral;    SELECTIVE INJECTION OF ANESTHETIC AGENT AROUND LUMBAR SPINAL NERVE ROOT BY TRANSFORAMINAL APPROACH Bilateral 4/26/2023    Procedure: Bilateral L4/5 TF XANDER RN IV Sedation;  Surgeon: Francisco Oshea MD;  [facility]: [facility];  Service: Pain Management;  Laterality: Bilateral;    TRANSFORAMINAL EPIDURAL  INJECTION OF STEROID Bilateral 2019    Procedure: Bilateral L3/4 Transforaminal Epidural Steroid Injection;  Surgeon: Desean Dias MD;  Location: [facility] PAIN MGT;  Service: Pain Management;  Laterality: Bilateral;    TRANSFORAMINAL EPIDURAL INJECTION OF STEROID Bilateral 3/10/2020    Procedure: Right T12/L1 TF XANDER with local;  Surgeon: Desean Dias MD;  Location: [facility] PAIN MGT;  Service: Pain Management;  Laterality: Bilateral;    TRANSFORAMINAL EPIDURAL INJECTION OF STEROID Right 2020    Procedure: Right T12/L1 TFESI Covid day of procedure;  Surgeon: Desean Dias MD;  Location: [facility] PAIN MGT;  Service: Pain Management;  Laterality: Right;         Family History:  Family History   Problem Relation Age of Onset    No Known Problems Mother     No Known Problems Father        Social History:  Social History     Tobacco Use    Smoking status: Former     Current packs/day: 0.00     Average packs/day: 1 pack/day for 42.3 years (42.3 ttl pk-yrs)     Types: Cigarettes     Start date:      Quit date: 2004     Years since quittin.3    Smokeless tobacco: Never   Substance and Sexual Activity    Alcohol use: No    Drug use: Never    Sexual activity: Not Currently     Partners: Male        Review of Systems     Review of Systems   Constitutional:  Positive for fever.   Gastrointestinal:  Positive for abdominal pain (generalized), diarrhea and nausea. Negative for vomiting.   Genitourinary:  Negative for dysuria.        Physical Exam     Initial Vitals [23 0831]   BP Pulse Resp Temp SpO2   (!) 89/54 88 16 97.9 °F (36.6 °C) 98 %      MAP       --          Physical Exam  Nursing Notes and Vital Signs Reviewed.  Constitutional: Patient is in no acute distress. Well-developed and well-nourished.  Head: Atraumatic. Normocephalic.  Eyes: PERRL. EOM intact. Conjunctivae are not pale. No scleral icterus.  ENT: Mucous membranes are dry.   Neck: Supple. Full ROM.   Cardiovascular: Regular rate.  Regular rhythm. No murmurs, rubs, or gallops. Distal pulses are 2+ and symmetric.  Pulmonary/Chest: No respiratory distress. Clear to auscultation bilaterally. No wheezing or rales.  Abdominal: Soft and non-distended.  There is mild abd tenderness.  No rebound, guarding, or rigidity.  Genitourinary: No CVA tenderness  Musculoskeletal: Moves all extremities. No obvious deformities. No edema.   Skin: Warm and dry.  Neurological:  Alert, awake, and appropriate.  Normal speech.  No acute focal neurological deficits are appreciated.  Psychiatric: Normal affect. Good eye contact. Appropriate in content.     ED Course   Procedures  ED Vital Signs:  Vitals:    08/20/23 0831 08/20/23 0843 08/20/23 1124 08/20/23 1125   BP: (!) 89/54 (!) 105/53  (!) 124/56   Pulse: 88  78    Resp: 16  16    Temp: 97.9 °F (36.6 °C)      SpO2: 98%  100%    Weight:  48.3 kg (106 lb 7.7 oz)      08/20/23 1241 08/20/23 1243 08/20/23 1414 08/20/23 1423   BP:  132/63     Pulse: 76  78 78   Resp: 18  18 20   Temp:       SpO2: 99%  100% 98%   Weight:           Abnormal Lab Results:  Labs Reviewed   CBC W/ AUTO DIFFERENTIAL - Abnormal; Notable for the following components:       Result Value    RBC 3.53 (*)     Hemoglobin 10.5 (*)     Hematocrit 31.9 (*)     RDW 15.6 (*)     MPV 9.1 (*)     Immature Granulocytes 2.1 (*)     Immature Grans (Abs) 0.19 (*)     Lymph % 17.0 (*)     All other components within normal limits   COMPREHENSIVE METABOLIC PANEL - Abnormal; Notable for the following components:    Calcium 8.4 (*)     Albumin 2.7 (*)     eGFR 58 (*)     All other components within normal limits   MAGNESIUM - Abnormal; Notable for the following components:    Magnesium 1.3 (*)     All other components within normal limits   URINALYSIS, REFLEX TO URINE CULTURE - Abnormal; Notable for the following components:    Specific Gravity, UA >=1.030 (*)     Protein, UA 2+ (*)     Ketones, UA Trace (*)     Bilirubin (UA) 2+ (*)     Nitrite, UA Positive (*)      All other components within normal limits    Narrative:     Specimen Source->Urine   LIPASE - Abnormal; Notable for the following components:    Lipase 107 (*)     All other components within normal limits   CULTURE, BLOOD    Narrative:     Aerobic and anaerobic   CULTURE, BLOOD    Narrative:     Aerobic and anaerobic   LACTIC ACID, PLASMA   PROCALCITONIN   TROPONIN I   LACTIC ACID, PLASMA   LIPASE   URINALYSIS MICROSCOPIC    Narrative:     Specimen Source->Urine        All Lab Results:  Results for orders placed or performed during the hospital encounter of 08/20/23   Blood culture x two cultures. Draw prior to antibiotics.    Specimen: Peripheral, Hand, Left; Blood   Result Value Ref Range    Blood Culture, Routine No Growth to date     Blood Culture, Routine No Growth to date    Blood culture x two cultures. Draw prior to antibiotics.    Specimen: Peripheral, Antecubital, Left; Blood   Result Value Ref Range    Blood Culture, Routine No Growth to date     Blood Culture, Routine No Growth to date    CBC auto differential   Result Value Ref Range    WBC 8.88 3.90 - 12.70 K/uL    RBC 3.53 (L) 4.00 - 5.40 M/uL    Hemoglobin 10.5 (L) 12.0 - 16.0 g/dL    Hematocrit 31.9 (L) 37.0 - 48.5 %    MCV 90 82 - 98 fL    MCH 29.7 27.0 - 31.0 pg    MCHC 32.9 32.0 - 36.0 g/dL    RDW 15.6 (H) 11.5 - 14.5 %    Platelets 248 150 - 450 K/uL    MPV 9.1 (L) 9.2 - 12.9 fL    Immature Granulocytes 2.1 (H) 0.0 - 0.5 %    Gran # (ANC) 6.0 1.8 - 7.7 K/uL    Immature Grans (Abs) 0.19 (H) 0.00 - 0.04 K/uL    Lymph # 1.5 1.0 - 4.8 K/uL    Mono # 0.8 0.3 - 1.0 K/uL    Eos # 0.3 0.0 - 0.5 K/uL    Baso # 0.04 0.00 - 0.20 K/uL    nRBC 0 0 /100 WBC    Gran % 67.8 38.0 - 73.0 %    Lymph % 17.0 (L) 18.0 - 48.0 %    Mono % 8.9 4.0 - 15.0 %    Eosinophil % 3.7 0.0 - 8.0 %    Basophil % 0.5 0.0 - 1.9 %    Differential Method Automated    Comprehensive metabolic panel   Result Value Ref Range    Sodium 140 136 - 145 mmol/L    Potassium 4.2 3.5 - 5.1  mmol/L    Chloride 105 95 - 110 mmol/L    CO2 24 23 - 29 mmol/L    Glucose 110 70 - 110 mg/dL    BUN 9 8 - 23 mg/dL    Creatinine 1.0 0.5 - 1.4 mg/dL    Calcium 8.4 (L) 8.7 - 10.5 mg/dL    Total Protein 6.6 6.0 - 8.4 g/dL    Albumin 2.7 (L) 3.5 - 5.2 g/dL    Total Bilirubin 0.5 0.1 - 1.0 mg/dL    Alkaline Phosphatase 59 55 - 135 U/L    AST 35 10 - 40 U/L    ALT 43 10 - 44 U/L    eGFR 58 (A) >60 mL/min/1.73 m^2    Anion Gap 11 8 - 16 mmol/L   Lactic acid, plasma #1   Result Value Ref Range    Lactate (Lactic Acid) 0.9 0.5 - 2.2 mmol/L   Magnesium   Result Value Ref Range    Magnesium 1.3 (L) 1.6 - 2.6 mg/dL   Procalcitonin   Result Value Ref Range    Procalcitonin 0.04 <0.25 ng/mL   Troponin I   Result Value Ref Range    Troponin I 0.025 0.000 - 0.026 ng/mL   Urinalysis, Reflex to Urine Culture Urine, Clean Catch    Specimen: Urine   Result Value Ref Range    Specimen UA Urine, Clean Catch     Color, UA Yellow Yellow, Straw, Jeanie    Appearance, UA Clear Clear    pH, UA 5.0 5.0 - 8.0    Specific Gravity, UA >=1.030 (A) 1.005 - 1.030    Protein, UA 2+ (A) Negative    Glucose, UA Negative Negative    Ketones, UA Trace (A) Negative    Bilirubin (UA) 2+ (A) Negative    Occult Blood UA Negative Negative    Nitrite, UA Positive (A) Negative    Urobilinogen, UA 1.0 <2.0 EU/dL    Leukocytes, UA Negative Negative   Lactic acid, plasma #2   Result Value Ref Range    Lactate (Lactic Acid) 0.8 0.5 - 2.2 mmol/L   Lipase   Result Value Ref Range    Lipase 107 (H) 4 - 60 U/L   Urinalysis Microscopic   Result Value Ref Range    RBC, UA 0 0 - 4 /hpf    WBC, UA 3 0 - 5 /hpf    Bacteria Rare None-Occ /hpf    Hyaline Casts, UA 1 0-1/lpf /lpf    Microscopic Comment SEE COMMENT      *Note: Due to a large number of results and/or encounters for the requested time period, some results have not been displayed. A complete set of results can be found in Results Review.        Imaging Results:  Imaging Results              CT Abdomen Pelvis  With Contrast (Final result)  Result time 08/20/23 14:40:21      Final result by Bhanu Brush III, MD (08/20/23 14:40:21)                   Impression:      Diverticulosis without CT evidence of diverticulitis.  No abscess.    Fluid density within the colon suggesting a diarrheal illness.      Electronically signed by: Kingsley Brush  Date:    08/20/2023  Time:    14:40               Narrative:    EXAMINATION:  CT ABDOMEN PELVIS WITH CONTRAST    CLINICAL HISTORY:  Abdominal abscess/infection suspected;    TECHNIQUE:  Low dose axial images, sagittal and coronal reformations were obtained from the lung bases to the pubic symphysis following the IV administration of 100 mL of Omnipaque 350    COMPARISON:  CT abdomen and pelvis 08/16/2023    FINDINGS:  Bilateral hip replacements limits evaluation of the pelvis.  Unchanged bilateral renal cysts.  No solid mass or hydronephrosis.  The liver, spleen, pancreas and gallbladder are normal.  No aortic aneurysm.  Calcification within the aorta and its branches.  No free air, free fluid or adenopathy.  No bowel obstruction.    Diverticulosis within the descending and sigmoid colon.  No CT evidence of acute diverticulitis.  No abscess identified.  The appendix is not visualized.  Fluid within the colon suggesting a diarrheal illness.    Interstitial prominence at the lung bases along with a 4 mm nodule within the right lower lobe, unchanged.  There is some honeycombing within the lower lobes bilaterally.                                       X-Ray Abdomen Flat And Erect (Final result)  Result time 08/20/23 12:00:19      Final result by Cyn Thacker MD (08/20/23 12:00:19)                   Impression:      No acute finding      Electronically signed by: Cyn Thacker  Date:    08/20/2023  Time:    12:00               Narrative:    EXAMINATION:  XR ABDOMEN FLAT AND ERECT    CLINICAL HISTORY:  Abdominal Pain;    TECHNIQUE:  Flat and erect AP views of the  abdomen were performed.    COMPARISON:  08/13/2023    FINDINGS:  No air distended bowel, significant air-fluid levels or overt pneumoperitoneum                                       X-Ray Chest AP Portable (Final result)  Result time 08/20/23 09:42:15      Final result by Cyn Thacker MD (08/20/23 09:42:15)                   Impression:      Mild increased pulmonary interstitial markings similar to prior exam      Electronically signed by: Cyn Thacker  Date:    08/20/2023  Time:    09:42               Narrative:    EXAMINATION:  XR CHEST AP PORTABLE    CLINICAL HISTORY:  Sepsis;    TECHNIQUE:  Single frontal portable view of the chest was performed.    COMPARISON:  05/16/2023    FINDINGS:  No new pulmonary consolidation or pleural effusion.  Interstitial markings mildly increased similar.  No convincing pneumothorax                                       The EKG was ordered, reviewed, and independently interpreted by the ED provider.  Interpretation time: 08:36  Rate: 78 BPM  Rhythm: normal sinus rhythm  Interpretation: Left axis deviation. Left bundle branch block. No STEMI.             The Emergency Provider reviewed the vital signs and test results, which are outlined above.     ED Discussion       3:29 PM: Reassessed pt at this time. Discussed with pt all pertinent ED information and results. No diarrhea while here in ER.  Pt able to tolerate PO and ambulate without assistance. Discussed pt dx and plan of tx. Gave pt all f/u and return to the ED instructions. All questions and concerns were addressed at this time. Pt expresses understanding of information and instructions, and is comfortable with plan to discharge. Pt is stable for discharge.    I discussed with patient and/or family/caretaker that evaluation in the ED does not suggest any emergent or life threatening medical conditions requiring immediate intervention beyond what was provided in the ED, and I believe patient is safe for discharge.   Regardless, an unremarkable evaluation in the ED does not preclude the development or presence of a serious of life threatening condition. As such, patient was instructed to return immediately for any worsening or change in current symptoms.     Advised to take all prescriptions as directed and have close followup with pcp/GI as previously scheduled.    Advised patient to: drink 8 to 10 glasses of clear fluids every day; drink at least 1 cup of liquid after every loose bowel movement; eat small meals throughout the day;  consume foods and beverages containing sodium, such as pretzels, soup, and sports drinks; eat high potassium foods, such as bananas, potatoes without the skin, and watered-down fruit juices; and to get plenty of rest. Patient advised to follow up with primary care provider or return to the ER if they experience: blood or pus in stools; black stools; stomach pain that does not go away after a bowel movement; symptoms of dehydration (thirst, dizziness, lightheadedness);  diarrhea with a fever above 101°F (100.4 °F in children); and if the diarrhea gets worse or does not improve in 2 days. Patient was also encouraged to utilize Pepto-Bismol after each bowel movement.     Regarding ABDOMINAL PAIN, I recommended that the patient: Sip water or other clear fluids; avoid solid food for the first few hours after vomiting or diarrhea; if vomiting, wait 6 hours, and then eat small amounts of mild foods such as rice, applesauce, or crackers; avoid dairy products; avoid citrus, high-fat foods, fried or greasy foods, tomato products, caffeine, alcohol, and carbonated beverages;  avoid aspirin, ibuprofen or other anti-inflammatory medications, and narcotic pain medications unless prescribed.  In regards to prevention, I encouraged patient to:  Avoid fatty or greasy foods; drink plenty of water each day; eat small meals more frequently; exercise regularly; limit foods that produce gas; make sure meals are  well-balanced and high in fiber and include plenty of fruits and vegetables.       Medical Decision Making:   Clinical Tests:   Lab Tests: Ordered and Reviewed  Radiological Study: Ordered and Reviewed  Medical Tests: Ordered and Reviewed           ED Medication(s):  Medications   lactated ringers bolus 1,000 mL (0 mLs Intravenous Stopped 8/20/23 1200)   ondansetron injection 4 mg (4 mg Intravenous Given 8/20/23 1124)   ketorolac injection 15 mg (15 mg Intravenous Given 8/20/23 1123)   iohexoL (OMNIPAQUE 350) injection 100 mL (100 mLs Intravenous Given 8/20/23 1401)       Discharge Medication List as of 8/20/2023  3:29 PM           Follow-up Information       Laron Chaudhari MD. Schedule an appointment as soon as possible for a visit in 1 week.    Specialties: Internal Medicine, Pediatrics  Contact information:  [address]  [place] [id number] [phone]               [facility]. Schedule an appointment as soon as possible for a visit in 1 week.    Specialty: Gastroenterology  Contact information:  [address] [facility] [id number] [phone]  Additional information:  Please park on the [address] side and use the Clinic entrance. Check in on the 4th floor, to the left.             [facility] - Emergency Dept..    Specialty: Emergency Medicine  Why: As needed, If symptoms worsen  Contact information:  [address]  [facility] [id number] [phone]                               Scribe Attestation:   Scribe #1: I performed the above scribed service and the documentation accurately describes the services I performed. I attest to the accuracy of the note.     Attending:   Physician Attestation Statement for Scribe #1: I, Kristopher Patel Jr., MD, personally performed the services described in this documentation, as scribed by My Parveen, in my presence, and it is both accurate and complete.           Clinical Impression        ICD-10-CM ICD-9-CM   1. Diarrhea, unspecified type  R19.7 787.91   2. Hypotension  I95.9 458.9   3. Dehydration  E86.0 276.51       Disposition:   Disposition: Discharged  Condition: Stable        Kristopher Patel Jr., MD  08/21/23 0492

## 2023-08-20 NOTE — DISCHARGE INSTRUCTIONS
Take all prescriptions as directed and have close followup with pcp/GI.    Advised patient to: drink 8 to 10 glasses of clear fluids every day; drink at least 1 cup of liquid after every loose bowel movement; eat small meals throughout the day;  consume foods and beverages containing sodium, such as pretzels, soup, and sports drinks; eat high potassium foods, such as bananas, potatoes without the skin, and watered-down fruit juices; and to get plenty of rest. Patient advised to follow up with primary care provider or return to the ER if they experience: blood or pus in stools; black stools; stomach pain that does not go away after a bowel movement; symptoms of dehydration (thirst, dizziness, lightheadedness);  diarrhea with a fever above 101°F (100.4 °F in children); and if the diarrhea gets worse or does not improve in 2 days. Patient was also encouraged to utilize Pepto-Bismol after each bowel movement.     Regarding ABDOMINAL PAIN, I recommended that the patient: Sip water or other clear fluids; avoid solid food for the first few hours after vomiting or diarrhea; if vomiting, wait 6 hours, and then eat small amounts of mild foods such as rice, applesauce, or crackers; avoid dairy products; avoid citrus, high-fat foods, fried or greasy foods, tomato products, caffeine, alcohol, and carbonated beverages;  avoid aspirin, ibuprofen or other anti-inflammatory medications, and narcotic pain medications unless prescribed.  In regards to prevention, I encouraged patient to:  Avoid fatty or greasy foods; drink plenty of water each day; eat small meals more frequently; exercise regularly; limit foods that produce gas; make sure meals are well-balanced and high in fiber and include plenty of fruits and vegetables.

## 2023-08-20 NOTE — ED NOTES
Bed: 08  Expected date:   Expected time:   Means of arrival: Personal Transportation  Comments:  When clean

## 2023-08-20 NOTE — FIRST PROVIDER EVALUATION
Medical screening examination initiated.  I have conducted a focused provider triage encounter, findings are as follows:    Brief history of present illness:  Nausea and diarrhea, fever.  Recent admission with [facility]    Vitals:    08/20/23 0831 08/20/23 0843   BP: (!) 89/54 (!) 105/53   BP Location: Right arm Right arm   Patient Position: Sitting    Pulse: 88    Resp: 16    Temp: 97.9 °F (36.6 °C)    SpO2: 98%    Weight: (S)   Comment: bed weight needed 48.3 kg (106 lb 7.7 oz)       Pertinent physical exam:  alert, nad        Preliminary workup initiated; this workup will be continued and followed by the physician or advanced practice provider that is assigned to the patient when roomed.

## 2023-08-21 ENCOUNTER — TELEPHONE (OUTPATIENT)
Dept: GASTROENTEROLOGY | Facility: CLINIC | Age: 78
End: 2023-08-21
Payer: MEDICARE

## 2023-08-21 LAB
CALPROTECTIN STL-MCNT: ABNORMAL MCG/G
O+P STL MICRO: NORMAL

## 2023-08-21 NOTE — TELEPHONE ENCOUNTER
Reviewed ER notes.   Still with UTI but they are getting a culture on it. No diverticulitis any more.   I am happy with her labs. Appears that she is retaining enough fluids, etc.. continue to push fluids and avoid imodium. Continue Cipro and flagyl.

## 2023-08-21 NOTE — PLAN OF CARE
Patient relations reached out to  about HH being ordered at OR but no agency reached out to the patient. HH was ordered. CM was not consulted. No action was taken on referral or DME being ordered.     Sent HH referral to [facility] who accepted patient. SOC date is Thursday. Reached out to  director to escalate to a sooner admit date due to error on our part. Patient has been to the ED twice since OR last week.     Secure chat sent to [facility] to process walker order for a home delivery.

## 2023-08-21 NOTE — TELEPHONE ENCOUNTER
----- Message from Gillian Verdin sent at 8/21/2023  7:45 AM CDT -----  Contact: [phone]  Rm  is calling for LAB results for patient.  He also stated that she may need orders for home health. Please call patient at [phone]. Thanks KB

## 2023-08-22 ENCOUNTER — LAB VISIT (OUTPATIENT)
Dept: LAB | Facility: HOSPITAL | Age: 78
End: 2023-08-22
Attending: CHIROPRACTOR
Payer: MEDICARE

## 2023-08-22 ENCOUNTER — PATIENT MESSAGE (OUTPATIENT)
Dept: CARDIOLOGY | Facility: CLINIC | Age: 78
End: 2023-08-22
Payer: MEDICARE

## 2023-08-22 ENCOUNTER — OFFICE VISIT (OUTPATIENT)
Dept: HOME HEALTH SERVICES | Facility: CLINIC | Age: 78
End: 2023-08-22
Payer: MEDICARE

## 2023-08-22 DIAGNOSIS — Z09 HOSPITAL DISCHARGE FOLLOW-UP: Primary | ICD-10-CM

## 2023-08-22 DIAGNOSIS — R53.83 FATIGUE, UNSPECIFIED TYPE: ICD-10-CM

## 2023-08-22 DIAGNOSIS — D64.9 ACUTE ANEMIA: ICD-10-CM

## 2023-08-22 DIAGNOSIS — A09 DIARRHEA OF INFECTIOUS ORIGIN: ICD-10-CM

## 2023-08-22 DIAGNOSIS — E87.6 HYPOKALEMIA: Primary | ICD-10-CM

## 2023-08-22 LAB
ANION GAP SERPL CALC-SCNC: 12 MMOL/L (ref 8–16)
BASOPHILS # BLD AUTO: 0.01 K/UL (ref 0–0.2)
BASOPHILS NFR BLD: 0.2 % (ref 0–1.9)
BUN SERPL-MCNC: 8 MG/DL (ref 8–23)
CALCIUM SERPL-MCNC: 9.2 MG/DL (ref 8.7–10.5)
CHLORIDE SERPL-SCNC: 104 MMOL/L (ref 95–110)
CO2 SERPL-SCNC: 21 MMOL/L (ref 23–29)
CREAT SERPL-MCNC: 0.8 MG/DL (ref 0.5–1.4)
DIFFERENTIAL METHOD: ABNORMAL
EOSINOPHIL # BLD AUTO: 0.2 K/UL (ref 0–0.5)
EOSINOPHIL NFR BLD: 3.8 % (ref 0–8)
ERYTHROCYTE [DISTWIDTH] IN BLOOD BY AUTOMATED COUNT: 14.7 % (ref 11.5–14.5)
EST. GFR  (NO RACE VARIABLE): >60 ML/MIN/1.73 M^2
GLUCOSE SERPL-MCNC: 77 MG/DL (ref 70–110)
H PYLORI AG STL QL IA: NOT DETECTED
HCT VFR BLD AUTO: 29.6 % (ref 37–48.5)
HGB BLD-MCNC: 9.4 G/DL (ref 12–16)
IMM GRANULOCYTES # BLD AUTO: 0.06 K/UL (ref 0–0.04)
IMM GRANULOCYTES NFR BLD AUTO: 1.1 % (ref 0–0.5)
LYMPHOCYTES # BLD AUTO: 1 K/UL (ref 1–4.8)
LYMPHOCYTES NFR BLD: 17.6 % (ref 18–48)
MCH RBC QN AUTO: 29.5 PG (ref 27–31)
MCHC RBC AUTO-ENTMCNC: 31.8 G/DL (ref 32–36)
MCV RBC AUTO: 93 FL (ref 82–98)
MONOCYTES # BLD AUTO: 0.6 K/UL (ref 0.3–1)
MONOCYTES NFR BLD: 11.1 % (ref 4–15)
NEUTROPHILS # BLD AUTO: 3.7 K/UL (ref 1.8–7.7)
NEUTROPHILS NFR BLD: 66.2 % (ref 38–73)
NRBC BLD-RTO: 0 /100 WBC
PLATELET # BLD AUTO: 233 K/UL (ref 150–450)
PMV BLD AUTO: 9.9 FL (ref 9.2–12.9)
POTASSIUM SERPL-SCNC: 3.8 MMOL/L (ref 3.5–5.1)
RBC # BLD AUTO: 3.19 M/UL (ref 4–5.4)
SODIUM SERPL-SCNC: 137 MMOL/L (ref 136–145)
SPECIMEN SOURCE: NORMAL
WBC # BLD AUTO: 5.51 K/UL (ref 3.9–12.7)

## 2023-08-22 PROCEDURE — 99350 HOME/RES VST EST HIGH MDM 60: CPT | Mod: S$GLB,,,

## 2023-08-22 PROCEDURE — 99350 PR HOME VISIT,ESTAB PATIENT,LEVEL IV: ICD-10-PCS | Mod: S$GLB,,,

## 2023-08-22 PROCEDURE — 85025 COMPLETE CBC W/AUTO DIFF WBC: CPT | Performed by: PEDIATRICS

## 2023-08-22 PROCEDURE — 80048 BASIC METABOLIC PNL TOTAL CA: CPT | Performed by: PEDIATRICS

## 2023-08-22 NOTE — PROGRESS NOTES
[facility]  Transition of Care Home Visit    Visit Date: 8/22/2023  Encounter Provider: Herber Cobian   PCP:  Laron Chaudhari MD    PRESENTING HISTORY      Patient ID: Shireen Felix is a 77 y.o. female.    Consult Requested By:  Dr. Sandra Geller  Reason for Consult:  Hospital Follow Up.    Shireen is being seen at home due to being seen at home due to physical debility that presents a taxing effort to leave the home, to mitigate high risk of hospital readmission and/or due to the limited availability of reliable or safe options for transportation to the point of access to the provider. Prior to treatment on this visit the chart was reviewed and patient verbal consent was obtained.      Chief Complaint: Transitional Care        History of Present Illness: Ms. Shireen Felix is a 77 y.o. female who was recently admitted to the hospital.    8/13-8/16 admission with medical diagnoses including COPD, non-insulin-dependent type 2 diabetes mellitus, CAD, essential hypertension with dyslipidemia, history of stroke, GERD, and glaucoma who presented to the ER with intractable nausea with NBNB vomiting.  Onset of symptoms 3-4 days prior to admission, associated with epigastric abdominal pain and frequent nonbloody diarrhea (3-4 episodes a day).  Patient reports symptoms were mild at 1st but soon progressed to the point where she was unable to tolerate any food and was becoming weaker and weaker.  This is what prompted her to come into the ER for evaluation.  No prior history of similar symptoms, no new medication changes, no known sick contacts, no changes in diet, no fevers, chills, chest pain, shortness of breath or skin rashes.     In the ER, patient's vital signs were grossly within normal limits and she remained hemodynamically stable since arrival.  Routine labs showed several abnormalities, including a abnormal urinalysis suggestive of UTI, a mildly elevated procalcitonin of 0.3, hypomagnesemia of  1.2, acute kidney injury with Cr of 1.5  (baseline approximately 0.6), hypokalemia of 2.8, CO2 of 14 and a hypoalbuminemia of 2.9.  Pertinent negatives include a normal lactic acid was 0.7, normal lipase, normal WBC.  No imaging obtained in the ER for review.  Hospital Medicine called for admission.        * No surgery found *       Hospital Course:   8/14 admitted for acute kidney injury, which has resolved. Urinalysis positive. Receiving intravenous antibiotic(s). Complaining of diarrhea and dysuria. Cdiff negative. Denies recurrent utis. Follows urology outpatient for incontinence. Cva tenderness but imaging unremarkable  8/15 continues to complains of diarrhea and concerns of worms. Stool ova and parasite pending. Urine culture unremarkable. Discontinue rocephin. Physical/occupational therapy recommending homehealth physical/occupational therapy.   8/16 hb/hct downtrending. Diarrhea improving. Cta GI bleed pending. Pmh polyps on cscope approximately 5 years ago and failed to follow up. Consider gastroenterology consult pending imaging     Cta GI bleed with no acute bleeding but concerns for diverticulitis. Received 2 days of rocephin. Will send additional course of flagyl and cefdinir.     Stool growing salmonella. Received rocephin. Continue 3rd gen cephalosporin po.  ___________________________________________________________________    Today:    HPI:  Patient is a 77 y.o. female being seen today for a post hospital discharge assessment following a recent admit for intractable nausea with NBNB vomiting. Patient presented with medical diagnoses including COPD, non-insulin-dependent type 2 diabetes mellitus, CAD, essential hypertension with dyslipidemia, history of stroke, GERD, and glaucoma. She was hospitalized 8/13-8/16. See hospital course.   Patient is found in their home today, in no acute distress and agreeable to this visit.  Her vital signs are stable, AAOx3. Her brother is present during visit. Patient  reports slight improvement since discharge. Still with diarrhea but this has improved.  Reports occasional weakness/dizziness when ambulating. She ambulates with walker.  Working with HH and PT/OT.  Appetite is fair. Slowly increasing intake. Patient reports compliance with all medications.  Patient has no further complaints or concerns at this time.               Review of Systems   Constitutional:  Positive for appetite change and fatigue.   HENT: Negative.     Eyes: Negative.    Respiratory: Negative.  Negative for chest tightness.    Cardiovascular: Negative.  Negative for leg swelling.   Gastrointestinal:  Positive for diarrhea.   Endocrine: Negative.    Genitourinary: Negative.    Musculoskeletal:  Positive for arthralgias and back pain.   Skin: Negative.    Allergic/Immunologic: Negative.    Neurological:  Positive for dizziness and weakness.   Hematological: Negative.    Psychiatric/Behavioral: Negative.  Negative for agitation.    All other systems reviewed and are negative.      Assessments:  Environmental: Patient lives in a mobile home.  There are steps and a ramp at the entrance. There is adequate lighting and the temperature is comfortable.    Functional Status: Ambulates with walker. Working with HH/PT/OT  Safety: Fall precautions.   Nutritional: Adequate food in the home.   Home Health/DME/Supplies: [facility] .    PAST HISTORY:     Past Medical History:   Diagnosis Date    Abnormal ECG 8/5/2019    Aneurysm     Anticoagulant long-term use     Arthritis     CAD in native artery 8/5/2019    COPD (chronic obstructive pulmonary disease)     Diabetes mellitus     Fall 10/10/2019    Formatting of this note might be different from the original. Found sitting on floor next to bed last night Mild confusion today Does not recall falling or how she ended up on floor UA today Labs yesterday unremarkable    Glaucoma     Hypertension     Seizures     Shingles 05/27/2017    Stroke        Past Surgical History:    Procedure Laterality Date    BRAIN SURGERY      CARDIAC CATHETERIZATION      CORONARY ANGIOPLASTY      EPIDURAL STEROID INJECTION INTO LUMBAR SPINE Bilateral 11/12/2019    Procedure: TF XANDER L4/5;  Surgeon: Desean Dias MD;  Location: [facility];  Service: Pain Management;  Laterality: Bilateral;    HYSTERECTOMY      INJECTION OF ANESTHETIC AGENT AROUND MEDIAL BRANCH NERVES INNERVATING LUMBAR FACET JOINT Bilateral 1/31/2020    Procedure: Bilateral L3-5 MBB;  Surgeon: Desean Dias MD;  Location: [facility];  Service: Pain Management;  Laterality: Bilateral;    INJECTION OF ANESTHETIC AGENT INTO SACROILIAC JOINT Right 11/16/2021    Procedure: Right BLOCK, SACROILIAC JOINT Right GTB with RN IV sedation;  Surgeon: Yao Fulton MD;  Location: [facility];  Service: Pain Management;  Laterality: Right;    INJECTION OF ANESTHETIC AGENT INTO SACROILIAC JOINT Bilateral 7/28/2023    Procedure: Bilateral GT bursa + bilateral SIJ injection;  Surgeon: Francisco Oshea MD;  Location: [facility];  Service: Pain Management;  Laterality: Bilateral;    INJECTION OF JOINT Bilateral 7/9/2020    Procedure: Bilateral shoulder GH Joint injection with local;  Surgeon: Desean Dias MD;  Location: [facility];  Service: Pain Management;  Laterality: Bilateral;    INJECTION OF JOINT Bilateral 7/28/2023    Procedure: Bilateral GT bursa + bilateral SIJ injection NEEDS ADDITIONAL SEDATION AND MORE TIME BEFORE INJECTION RN IV Sedation;  Surgeon: Francisco Oshea MD;  Location: [facility];  Service: Pain Management;  Laterality: Bilateral;    SELECTIVE INJECTION OF ANESTHETIC AGENT AROUND LUMBAR SPINAL NERVE ROOT BY TRANSFORAMINAL APPROACH Bilateral 4/26/2023    Procedure: Bilateral L4/5 TF XANDER RN IV Sedation;  Surgeon: Francisco Oshea MD;  Location: [facility];  Service: Pain Management;  Laterality: Bilateral;    TRANSFORAMINAL EPIDURAL INJECTION OF STEROID Bilateral 7/23/2019    Procedure: Bilateral L3/4  Transforaminal Epidural Steroid Injection;  Surgeon: Desean Dias MD;  Location: [facility];  Service: Pain Management;  Laterality: Bilateral;    TRANSFORAMINAL EPIDURAL INJECTION OF STEROID Bilateral 3/10/2020    Procedure: Right T12/L1 TF XANDER with local;  Surgeon: Desean Dias MD;  Location: [facility];  Service: Pain Management;  Laterality: Bilateral;    TRANSFORAMINAL EPIDURAL INJECTION OF STEROID Right 2020    Procedure: Right T12/L1 TFESI Covid day of procedure;  Surgeon: Desean Dias MD;  Location: [facility];  Service: Pain Management;  Laterality: Right;       Family History   Problem Relation Age of Onset    No Known Problems Mother     No Known Problems Father        Social History     Socioeconomic History    Marital status: Single   Tobacco Use    Smoking status: Former     Current packs/day: 0.00     Average packs/day: 1 pack/day for 42.3 years (42.3 ttl pk-yrs)     Types: Cigarettes     Start date:      Quit date: 2004     Years since quittin.3    Smokeless tobacco: Never   Substance and Sexual Activity    Alcohol use: No    Drug use: Never    Sexual activity: Not Currently     Partners: Male   Social History Narrative    No pets or smokers in household.     Social Determinants of Health     Stress: Stress Concern Present (2020)    [employer] - Occupational Stress Questionnaire     Feeling of Stress : Very much       MEDICATIONS & ALLERGIES:     Current Outpatient Medications on File Prior to Visit   Medication Sig Dispense Refill    albuterol (PROVENTIL) 2.5 mg /3 mL (0.083 %) nebulizer solution Take 2.5 mg by nebulization every 6 (six) hours as needed for Wheezing. Rescue      albuterol (PROVENTIL/VENTOLIN HFA) 90 mcg/actuation inhaler INHALE TWO PUFFS INTO THE LUNGS EVERY 4 HOURS AS NEEDED 18 g 11    APTIOM 400 mg Tab tablet Take 400 mg by mouth.      atorvastatin (LIPITOR) 40 MG tablet TAKE 1 TABLET BY MOUTH ONCE DAILY 90  tablet 1    carvediloL (COREG) 6.25 MG tablet Take 1 tablet (6.25 mg total) by mouth 2 (two) times daily with meals. 60 tablet 0    cholecalciferol, vitamin D3, 1,250 mcg (50,000 unit) capsule Take 1 capsule (50,000 Units total) by mouth every 7 days. 12 capsule 0    ciprofloxacin HCl (CIPRO) 500 MG tablet Take 1 tablet (500 mg total) by mouth 2 (two) times daily. for 10 days 20 tablet 0    clopidogreL (PLAVIX) 75 mg tablet Take 1 tablet (75 mg total) by mouth once daily. 90 tablet 3    DALIRESP 500 mcg Tab TAKE 1 TABLET BY MOUTH ONCE DAILY 90 tablet 3    denosumab (PROLIA) 60 mg/mL Syrg every 6 (six) months.      dicyclomine (BENTYL) 10 MG capsule Take 1 capsule (10 mg total) by mouth 2 (two) times daily. 10 capsule 0    diltiaZEM (CARDIZEM CD) 120 MG Cp24 Take 2 capsules (240 mg total) by mouth once daily. 180 capsule 1    dorzolamide-timolol 2-0.5% (COSOPT) 22.3-6.8 mg/mL ophthalmic solution Place 1 drop into both eyes every 12 (twelve) hours. (Patient not taking: Reported on 8/18/2023) 10 mL 12    EScitalopram oxalate (LEXAPRO) 20 MG tablet TAKE 1 TABLET BY MOUTH ONCE DAILY 90 tablet 1    fluticasone propionate (FLONASE) 50 mcg/actuation nasal spray USE 2 SPRAYS INTO EACH NOSTRIL ONCE A DAY AT 6AM AS DIRECTED 16 g 11    fluticasone-umeclidin-vilanter (TRELEGY ELLIPTA) 200-62.5-25 mcg inhaler INHALE 1 PUFF BY MOUTH ONCE A DAY 60 each 11    furosemide (LASIX) 20 MG tablet Take 20 mg by mouth daily as needed.      gabapentin (NEURONTIN) 300 MG capsule Take 1 capsule (300 mg total) by mouth 3 (three) times daily. 90 capsule 1    ipratropium (ATROVENT) 42 mcg (0.06 %) nasal spray USE 2 SPRAYS INTO EACH NOSTRIL FOUR TIMES DAILY 15 mL 11    isosorbide mononitrate (IMDUR) 60 MG 24 hr tablet Take 1 tablet (60 mg total) by mouth every evening. 90 tablet 1    ketoconazole (NIZORAL) 2 % cream Apply topically 2 (two) times daily. For dry flaky patches of ear. 30 g 1    latanoprost 0.005 % ophthalmic solution Place 1 drop  into both eyes every evening. 2.5 mL 12    levETIRAcetam (KEPPRA) 500 MG Tab Take 1 tablet (500 mg total) by mouth 2 (two) times daily. 180 tablet 3    magnesium oxide (MAG-OX) 400 mg (241.3 mg magnesium) tablet Take 1 tablet (400 mg total) by mouth once daily. 90 tablet 1    montelukast (SINGULAIR) 10 mg tablet TAKE 1 TABLET BY MOUTH ONCE A DAY 90 tablet 3    MYRBETRIQ 50 mg Tb24 TAKE 1 TABLET BY MOUTH ONCE DAILY 30 tablet 0    nabumetone (RELAFEN) 500 MG tablet Take 1 tablet (500 mg total) by mouth 2 (two) times daily. 60 tablet 2    nitroGLYCERIN (NITROSTAT) 0.4 MG SL tablet DISSOLVE 1 TABLET UNDER TONGUE EVERY 5 MINUTES FOR CHEST PAIN (MAY TAKE UP TO 3 DOSES THEN SEEK MEDICAL ATTENTION) 25 tablet 0    nortriptyline (PAMELOR) 25 MG capsule Take 1 capsule (25 mg total) by mouth every evening. 90 capsule 0    ondansetron (ZOFRAN-ODT) 4 MG TbDL Take 1 tablet (4 mg total) by mouth every 6 (six) hours as needed. 30 tablet 0    pantoprazole (PROTONIX) 40 MG tablet Take 40 mg by mouth 2 (two) times daily.      potassium chloride SA (K-DUR,KLOR-CON) 20 MEQ tablet Take 1 tablet (20 mEq total) by mouth once daily. Take extra dose with Lasix - fluid pill 90 tablet 1    ranolazine (RANEXA) 1,000 mg Tb12 TAKE 1 TABLET BY MOUTH TWICE DAILY 180 tablet 3    triamcinolone acetonide 0.025% (KENALOG) 0.025 % cream Apply topically 2 (two) times daily. PRN rash and itching of ear. Mild steroid cream. (Patient not taking: Reported on 8/18/2023) 30 g 0     No current facility-administered medications on file prior to visit.        Review of patient's allergies indicates:   Allergen Reactions    Penicillins Anaphylaxis, Hives, Shortness Of Breath and Swelling    Adhesive Rash    Doxycycline Nausea Only    Oxycodone Other (See Comments)     Fatigue      Sulfa (sulfonamide antibiotics) Itching       OBJECTIVE:     Vital Signs:  Vitals:    08/22/23 1222   BP: (!) (P) 108/58   Pulse: (P) 67   Resp: (P) 18     There is no height or weight on  "file to calculate BMI.     Physical Exam:  Physical Exam  Vitals reviewed.   Constitutional:       General: She is not in acute distress.     Appearance: Normal appearance. She is well-developed.   HENT:      Head: Normocephalic and atraumatic.      Nose: Nose normal.      Mouth/Throat:      Mouth: Mucous membranes are dry.      Pharynx: Oropharynx is clear.   Eyes:      Pupils: Pupils are equal, round, and reactive to light.   Cardiovascular:      Rate and Rhythm: Normal rate and regular rhythm.      Pulses: Normal pulses.      Heart sounds: Normal heart sounds.   Pulmonary:      Effort: Pulmonary effort is normal.      Breath sounds: Normal breath sounds.   Abdominal:      General: Bowel sounds are normal.      Palpations: Abdomen is soft.   Musculoskeletal:         General: Normal range of motion.      Cervical back: Normal range of motion and neck supple.   Skin:     General: Skin is warm and dry.   Neurological:      General: No focal deficit present.      Mental Status: She is alert and oriented to person, place, and time. Mental status is at baseline.      Motor: Weakness present.      Gait: Gait abnormal.   Psychiatric:         Mood and Affect: Mood normal.         Behavior: Behavior normal.         Thought Content: Thought content normal.         Judgment: Judgment normal.         Laboratory  Lab Results   Component Value Date    WBC 8.88 08/20/2023    HGB 10.5 (L) 08/20/2023    HCT 31.9 (L) 08/20/2023    MCV 90 08/20/2023     08/20/2023     Lab Results   Component Value Date    INR 1.0 11/05/2020    INR 1.0 08/20/2020    INR 1.0 08/05/2020     Lab Results   Component Value Date    HGBA1C 5.1 10/26/2022     No results for input(s): "POCTGLUCOSE" in the last 72 hours.    Diagnostic Results:      TRANSITION OF CARE:     [facility] Contact Note: 8/17    Family and/or Caretaker present at visit?  No.  Diagnostic tests reviewed/disposition: No diagnosic tests pending after this " hospitalization.  Disease/illness education: Take all medication as prescribed. Activity as tolerated. Keep all upcoming appts.   Home health/community services discussion/referrals: Patient has home health established at [facility] .   Establishment or re-establishment of referral orders for community resources: No other necessary community resources.   Discussion with other health care providers: No discussion with other health care providers necessary.     Transition of Care Visit:  I have reviewed and updated the history and problem list.  I have reconciled the medication list.  I have discussed the hospitalization and current medical issues, prognosis and plans with the patient/family.  I  spent more than 50% of time discussing the care with the patient/family.  Total Face-to-Face Encounter: 60 minutes.    Medications Reconciliation:   I have reconciled the patient's home medications and discharge medications with the patient/family. I have updated all changes.  Refer to After-Visit Medication List.    ASSESSMENT & PLAN:     HIGH RISK CONDITION(S):      Problem List Items Addressed This Visit          Oncology    Acute anemia       GI    Diarrhea    Current Assessment & Plan     C-Diff/shiga toxin negative  Stool culture positive for salmonella  Completed Flagyl, still taking 10 day course Cipro  Encourage fluids/bland diet  Monitor            Other    Fatigue    Current Assessment & Plan     Due to multiple ED visits and diarrhea  Continue current medication  Monitor          Other Visit Diagnoses       Hospital discharge follow-up    -  Primary             Were controlled substances prescribed?  No    Instructions for the patient:  - Continue all medications, treatments and therapies as ordered.   - Follow all instructions, recommendations as discussed.  - Maintain Safety Precautions at all times.  - Attend all medical appointments as scheduled.  - For worsening symptoms: call Primary Care Physician or Nurse  Practitioner.  - For emergencies, call 911 or immediately report to the nearest emergency room.   Scheduled Follow-up :  Future Appointments   Date Time Provider Department Center   8/23/2023  8:00 AM Kingsley Darling MD ON[facility] ARR [facility]   8/29/2023 10:20 AM Alanis Peres PA-C ONLC IN PN [facility]   8/31/2023  2:30 PM Ashley Coyle, NP [facility] GASTRO [facility]   9/1/2023  7:40 AM SPECIMEN, [facility] SPECLAB [facility]   9/1/2023  8:20 AM LABORATORY, [facility] LAB [facility]   9/1/2023  9:00 AM ONLH MAMMO1 [facility] MAMMO [facility]   9/8/2023 11:40 AM Olivia Mcdowell NP [facility] IM [facility]   10/11/2023  2:00 PM Susan Contreras NP ON [place]URO [place]   11/13/2023 10:45 AM Elie Hobbs MD [facility] OPHTHAL [facility]   11/14/2023 10:00 AM ONLC BMD1 [facility] DEXABMD [place]   11/14/2023 10:30 AM LABORATORY, [facility] ON[facility] LAB [facility]   11/29/2023 11:30 AM Julien Hernandez MD ON RHEU [place]   11/29/2023  1:00 PM INJECTION 1, [facility] INFUSION [facility] INFSN [facility]   11/30/2023 11:00 AM Franck Mueller MD [facility] NEPHRO [facility]   12/13/2023 11:30 AM Susan Contreras NP ON NEURO [facility]   5/14/2024  2:30 PM PULMONARY LABEDUARD [facility] PULMFUN [facility]   5/14/2024  3:20 PM Dyana Fishman NP [facility] PULMSVC [facility]       After Visit Medication List :     Medication List            Accurate as of August 22, 2023  4:29 PM. If you have any questions, ask your nurse or doctor.                CONTINUE taking these medications      * albuterol 90 mcg/actuation inhaler  Commonly known as: PROVENTIL/VENTOLIN HFA  INHALE TWO PUFFS INTO THE LUNGS EVERY 4 HOURS AS NEEDED     * albuterol 2.5 mg /3 mL (0.083 %) nebulizer solution  Commonly known as: PROVENTIL     APTIOM 400 mg Tab tablet  Generic drug: eslicarbazepine     atorvastatin 40 MG tablet  Commonly known as: LIPITOR  TAKE 1 TABLET BY MOUTH ONCE DAILY     carvediloL 6.25 MG tablet  Commonly known as: COREG  Take 1 tablet (6.25 mg total)  by mouth 2 (two) times daily with meals.     cholecalciferol (vitamin D3) 1,250 mcg (50,000 unit) capsule  Take 1 capsule (50,000 Units total) by mouth every 7 days.     ciprofloxacin HCl 500 MG tablet  Commonly known as: CIPRO  Take 1 tablet (500 mg total) by mouth 2 (two) times daily. for 10 days     clopidogreL 75 mg tablet  Commonly known as: PLAVIX  Take 1 tablet (75 mg total) by mouth once daily.     DALIRESP 500 mcg Tab  Generic drug: roflumilast  TAKE 1 TABLET BY MOUTH ONCE DAILY     denosumab 60 mg/mL Syrg  Commonly known as: PROLIA     dicyclomine 10 MG capsule  Commonly known as: BENTYL  Take 1 capsule (10 mg total) by mouth 2 (two) times daily.     diltiaZEM 120 MG Cp24  Commonly known as: CARDIZEM CD  Take 2 capsules (240 mg total) by mouth once daily.     dorzolamide-timolol 2-0.5% 22.3-6.8 mg/mL ophthalmic solution  Commonly known as: COSOPT  Place 1 drop into both eyes every 12 (twelve) hours.     EScitalopram oxalate 20 MG tablet  Commonly known as: LEXAPRO  TAKE 1 TABLET BY MOUTH ONCE DAILY     fluticasone propionate 50 mcg/actuation nasal spray  Commonly known as: FLONASE  USE 2 SPRAYS INTO EACH NOSTRIL ONCE A DAY AT 6AM AS DIRECTED     furosemide 20 MG tablet  Commonly known as: LASIX     gabapentin 300 MG capsule  Commonly known as: NEURONTIN  Take 1 capsule (300 mg total) by mouth 3 (three) times daily.     ipratropium 42 mcg (0.06 %) nasal spray  Commonly known as: ATROVENT  USE 2 SPRAYS INTO EACH NOSTRIL FOUR TIMES DAILY     isosorbide mononitrate 60 MG 24 hr tablet  Commonly known as: IMDUR  Take 1 tablet (60 mg total) by mouth every evening.     ketoconazole 2 % cream  Commonly known as: NIZORAL  Apply topically 2 (two) times daily. For dry flaky patches of ear.     latanoprost 0.005 % ophthalmic solution  Place 1 drop into both eyes every evening.     levETIRAcetam 500 MG Tab  Commonly known as: KEPPRA  Take 1 tablet (500 mg total) by mouth 2 (two) times daily.     magnesium oxide 400 mg  (241.3 mg magnesium) tablet  Commonly known as: MAG-OX  Take 1 tablet (400 mg total) by mouth once daily.     montelukast 10 mg tablet  Commonly known as: SINGULAIR  TAKE 1 TABLET BY MOUTH ONCE A DAY     MYRBETRIQ 50 mg Tb24  Generic drug: mirabegron  TAKE 1 TABLET BY MOUTH ONCE DAILY     nabumetone 500 MG tablet  Commonly known as: RELAFEN  Take 1 tablet (500 mg total) by mouth 2 (two) times daily.     nitroGLYCERIN 0.4 MG SL tablet  Commonly known as: NITROSTAT  DISSOLVE 1 TABLET UNDER TONGUE EVERY 5 MINUTES FOR CHEST PAIN (MAY TAKE UP TO 3 DOSES THEN SEEK MEDICAL ATTENTION)     nortriptyline 25 MG capsule  Commonly known as: PAMELOR  Take 1 capsule (25 mg total) by mouth every evening.     ondansetron 4 MG Tbdl  Commonly known as: ZOFRAN-ODT  Take 1 tablet (4 mg total) by mouth every 6 (six) hours as needed.     pantoprazole 40 MG tablet  Commonly known as: PROTONIX     potassium chloride SA 20 MEQ tablet  Commonly known as: K-DUR,KLOR-CON  Take 1 tablet (20 mEq total) by mouth once daily. Take extra dose with Lasix - fluid pill     ranolazine 1,000 mg Tb12  Commonly known as: RANEXA  TAKE 1 TABLET BY MOUTH TWICE DAILY     TRELEGY ELLIPTA 200-62.5-25 mcg inhaler  Generic drug: fluticasone-umeclidin-vilanter  INHALE 1 PUFF BY MOUTH ONCE A DAY     triamcinolone acetonide 0.025% 0.025 % cream  Commonly known as: KENALOG  Apply topically 2 (two) times daily. PRN rash and itching of ear. Mild steroid cream.           * This list has 2 medication(s) that are the same as other medications prescribed for you. Read the directions carefully, and ask your doctor or other care provider to review them with you.                  Signature: Herber Cobian NP

## 2023-08-22 NOTE — ASSESSMENT & PLAN NOTE
C-Diff/shiga toxin negative  Stool culture positive for salmonella  Completed Flagyl, still taking 10 day course Cipro  Encourage fluids/bland diet  Monitor

## 2023-08-23 LAB — O+P STL MICRO: NORMAL

## 2023-08-25 LAB
BACTERIA BLD CULT: NORMAL
BACTERIA BLD CULT: NORMAL

## 2023-08-30 ENCOUNTER — LAB VISIT (OUTPATIENT)
Dept: LAB | Facility: HOSPITAL | Age: 78
End: 2023-08-30
Attending: PEDIATRICS
Payer: MEDICARE

## 2023-08-30 DIAGNOSIS — D64.9 ANEMIA, UNSPECIFIED: ICD-10-CM

## 2023-08-30 DIAGNOSIS — R19.7 DIARRHEA OF PRESUMED INFECTIOUS ORIGIN: ICD-10-CM

## 2023-08-30 DIAGNOSIS — E11.29 TYPE II DIABETES MELLITUS WITH RENAL MANIFESTATIONS: ICD-10-CM

## 2023-08-30 DIAGNOSIS — I10 ESSENTIAL HYPERTENSION, MALIGNANT: ICD-10-CM

## 2023-08-30 DIAGNOSIS — I50.32 CHRONIC DIASTOLIC HEART FAILURE: ICD-10-CM

## 2023-08-30 DIAGNOSIS — E87.6 HYPOPOTASSEMIA: Primary | ICD-10-CM

## 2023-08-30 LAB
BASOPHILS # BLD AUTO: 0.04 K/UL (ref 0–0.2)
BASOPHILS NFR BLD: 0.7 % (ref 0–1.9)
DIFFERENTIAL METHOD: ABNORMAL
EOSINOPHIL # BLD AUTO: 0.1 K/UL (ref 0–0.5)
EOSINOPHIL NFR BLD: 2.4 % (ref 0–8)
ERYTHROCYTE [DISTWIDTH] IN BLOOD BY AUTOMATED COUNT: 14.1 % (ref 11.5–14.5)
HCT VFR BLD AUTO: 34.2 % (ref 37–48.5)
HGB BLD-MCNC: 10.4 G/DL (ref 12–16)
IMM GRANULOCYTES # BLD AUTO: 0.03 K/UL (ref 0–0.04)
IMM GRANULOCYTES NFR BLD AUTO: 0.6 % (ref 0–0.5)
LYMPHOCYTES # BLD AUTO: 1.1 K/UL (ref 1–4.8)
LYMPHOCYTES NFR BLD: 20.6 % (ref 18–48)
MCH RBC QN AUTO: 29 PG (ref 27–31)
MCHC RBC AUTO-ENTMCNC: 30.4 G/DL (ref 32–36)
MCV RBC AUTO: 95 FL (ref 82–98)
MONOCYTES # BLD AUTO: 0.6 K/UL (ref 0.3–1)
MONOCYTES NFR BLD: 10.3 % (ref 4–15)
NEUTROPHILS # BLD AUTO: 3.5 K/UL (ref 1.8–7.7)
NEUTROPHILS NFR BLD: 65.4 % (ref 38–73)
NRBC BLD-RTO: 0 /100 WBC
PLATELET # BLD AUTO: 282 K/UL (ref 150–450)
PMV BLD AUTO: 9.3 FL (ref 9.2–12.9)
RBC # BLD AUTO: 3.59 M/UL (ref 4–5.4)
WBC # BLD AUTO: 5.34 K/UL (ref 3.9–12.7)

## 2023-08-30 PROCEDURE — 80048 BASIC METABOLIC PNL TOTAL CA: CPT | Performed by: PEDIATRICS

## 2023-08-30 PROCEDURE — 85025 COMPLETE CBC W/AUTO DIFF WBC: CPT | Performed by: PEDIATRICS

## 2023-08-31 ENCOUNTER — LAB VISIT (OUTPATIENT)
Dept: LAB | Facility: HOSPITAL | Age: 78
End: 2023-08-31
Attending: PEDIATRICS
Payer: MEDICARE

## 2023-08-31 ENCOUNTER — OFFICE VISIT (OUTPATIENT)
Dept: GASTROENTEROLOGY | Facility: CLINIC | Age: 78
End: 2023-08-31
Payer: MEDICARE

## 2023-08-31 VITALS
HEART RATE: 83 BPM | BODY MASS INDEX: 18.82 KG/M2 | SYSTOLIC BLOOD PRESSURE: 120 MMHG | DIASTOLIC BLOOD PRESSURE: 62 MMHG | HEIGHT: 64 IN | WEIGHT: 110.25 LBS

## 2023-08-31 DIAGNOSIS — A09 INFECTIOUS DIARRHEA: ICD-10-CM

## 2023-08-31 DIAGNOSIS — N18.30 CONTROLLED TYPE 2 DIABETES MELLITUS WITH STAGE 3 CHRONIC KIDNEY DISEASE, WITHOUT LONG-TERM CURRENT USE OF INSULIN: ICD-10-CM

## 2023-08-31 DIAGNOSIS — A09 INFECTIOUS DIARRHEA: Primary | ICD-10-CM

## 2023-08-31 DIAGNOSIS — E11.22 CONTROLLED TYPE 2 DIABETES MELLITUS WITH STAGE 3 CHRONIC KIDNEY DISEASE, WITHOUT LONG-TERM CURRENT USE OF INSULIN: ICD-10-CM

## 2023-08-31 DIAGNOSIS — A02.9 SALMONELLA: ICD-10-CM

## 2023-08-31 DIAGNOSIS — R63.4 WEIGHT LOSS: ICD-10-CM

## 2023-08-31 LAB
ALBUMIN/CREAT UR: 229.9 UG/MG (ref 0–30)
ANION GAP SERPL CALC-SCNC: 14 MMOL/L (ref 8–16)
BUN SERPL-MCNC: 4 MG/DL (ref 8–23)
CALCIUM SERPL-MCNC: 8.5 MG/DL (ref 8.7–10.5)
CHLORIDE SERPL-SCNC: 105 MMOL/L (ref 95–110)
CO2 SERPL-SCNC: 20 MMOL/L (ref 23–29)
CREAT SERPL-MCNC: 0.8 MG/DL (ref 0.5–1.4)
CREAT UR-MCNC: 221 MG/DL (ref 15–325)
EST. GFR  (NO RACE VARIABLE): >60 ML/MIN/1.73 M^2
GLUCOSE SERPL-MCNC: 90 MG/DL (ref 70–110)
MICROALBUMIN UR DL<=1MG/L-MCNC: 508 UG/ML
POTASSIUM SERPL-SCNC: 3.4 MMOL/L (ref 3.5–5.1)
SODIUM SERPL-SCNC: 139 MMOL/L (ref 136–145)

## 2023-08-31 PROCEDURE — 3288F FALL RISK ASSESSMENT DOCD: CPT | Mod: CPTII,S$GLB,, | Performed by: NURSE PRACTITIONER

## 2023-08-31 PROCEDURE — 3074F SYST BP LT 130 MM HG: CPT | Mod: CPTII,S$GLB,, | Performed by: NURSE PRACTITIONER

## 2023-08-31 PROCEDURE — 87449 NOS EACH ORGANISM AG IA: CPT | Mod: 91 | Performed by: NURSE PRACTITIONER

## 2023-08-31 PROCEDURE — 99999 PR PBB SHADOW E&M-EST. PATIENT-LVL V: ICD-10-PCS | Mod: PBBFAC,,, | Performed by: NURSE PRACTITIONER

## 2023-08-31 PROCEDURE — 87046 STOOL CULTR AEROBIC BACT EA: CPT | Performed by: NURSE PRACTITIONER

## 2023-08-31 PROCEDURE — 99214 PR OFFICE/OUTPT VISIT, EST, LEVL IV, 30-39 MIN: ICD-10-PCS | Mod: S$GLB,,, | Performed by: NURSE PRACTITIONER

## 2023-08-31 PROCEDURE — 87449 NOS EACH ORGANISM AG IA: CPT | Performed by: NURSE PRACTITIONER

## 2023-08-31 PROCEDURE — 1101F PR PT FALLS ASSESS DOC 0-1 FALLS W/OUT INJ PAST YR: ICD-10-PCS | Mod: CPTII,S$GLB,, | Performed by: NURSE PRACTITIONER

## 2023-08-31 PROCEDURE — 87209 SMEAR COMPLEX STAIN: CPT | Performed by: NURSE PRACTITIONER

## 2023-08-31 PROCEDURE — 1125F AMNT PAIN NOTED PAIN PRSNT: CPT | Mod: CPTII,S$GLB,, | Performed by: NURSE PRACTITIONER

## 2023-08-31 PROCEDURE — 1101F PT FALLS ASSESS-DOCD LE1/YR: CPT | Mod: CPTII,S$GLB,, | Performed by: NURSE PRACTITIONER

## 2023-08-31 PROCEDURE — 82043 UR ALBUMIN QUANTITATIVE: CPT | Performed by: PEDIATRICS

## 2023-08-31 PROCEDURE — 87329 GIARDIA AG IA: CPT | Performed by: NURSE PRACTITIONER

## 2023-08-31 PROCEDURE — 3078F PR MOST RECENT DIASTOLIC BLOOD PRESSURE < 80 MM HG: ICD-10-PCS | Mod: CPTII,S$GLB,, | Performed by: NURSE PRACTITIONER

## 2023-08-31 PROCEDURE — 3078F DIAST BP <80 MM HG: CPT | Mod: CPTII,S$GLB,, | Performed by: NURSE PRACTITIONER

## 2023-08-31 PROCEDURE — 99999 PR PBB SHADOW E&M-EST. PATIENT-LVL V: CPT | Mod: PBBFAC,,, | Performed by: NURSE PRACTITIONER

## 2023-08-31 PROCEDURE — 1125F PR PAIN SEVERITY QUANTIFIED, PAIN PRESENT: ICD-10-PCS | Mod: CPTII,S$GLB,, | Performed by: NURSE PRACTITIONER

## 2023-08-31 PROCEDURE — 3288F PR FALLS RISK ASSESSMENT DOCUMENTED: ICD-10-PCS | Mod: CPTII,S$GLB,, | Performed by: NURSE PRACTITIONER

## 2023-08-31 PROCEDURE — 3074F PR MOST RECENT SYSTOLIC BLOOD PRESSURE < 130 MM HG: ICD-10-PCS | Mod: CPTII,S$GLB,, | Performed by: NURSE PRACTITIONER

## 2023-08-31 PROCEDURE — 1159F MED LIST DOCD IN RCRD: CPT | Mod: CPTII,S$GLB,, | Performed by: NURSE PRACTITIONER

## 2023-08-31 PROCEDURE — 87427 SHIGA-LIKE TOXIN AG IA: CPT | Mod: 59 | Performed by: NURSE PRACTITIONER

## 2023-08-31 PROCEDURE — 89055 LEUKOCYTE ASSESSMENT FECAL: CPT | Performed by: NURSE PRACTITIONER

## 2023-08-31 PROCEDURE — 99214 OFFICE O/P EST MOD 30 MIN: CPT | Mod: S$GLB,,, | Performed by: NURSE PRACTITIONER

## 2023-08-31 PROCEDURE — 1160F PR REVIEW ALL MEDS BY PRESCRIBER/CLIN PHARMACIST DOCUMENTED: ICD-10-PCS | Mod: CPTII,S$GLB,, | Performed by: NURSE PRACTITIONER

## 2023-08-31 PROCEDURE — 1160F RVW MEDS BY RX/DR IN RCRD: CPT | Mod: CPTII,S$GLB,, | Performed by: NURSE PRACTITIONER

## 2023-08-31 PROCEDURE — 1159F PR MEDICATION LIST DOCUMENTED IN MEDICAL RECORD: ICD-10-PCS | Mod: CPTII,S$GLB,, | Performed by: NURSE PRACTITIONER

## 2023-08-31 PROCEDURE — 87045 FECES CULTURE AEROBIC BACT: CPT | Performed by: NURSE PRACTITIONER

## 2023-08-31 RX ORDER — CHOLESTYRAMINE 4 G/9G
4 POWDER, FOR SUSPENSION ORAL 2 TIMES DAILY
Qty: 60 PACKET | Refills: 2 | Status: SHIPPED | OUTPATIENT
Start: 2023-08-31 | End: 2023-10-27

## 2023-08-31 NOTE — PROGRESS NOTES
Clinic Follow Up:  [facility] Follow Up Note    Reason for Follow Up:  The primary encounter diagnosis was Infectious diarrhea. Diagnoses of Weight loss and Salmonella were also pertinent to this visit.    PCP: Laron Chaudhari       HPI:  This is a 77 y.o. female here for follow up of infectious diarrhea.   She was seen 2 weeks ago for infectious diarrhea with salmonella. She reports no significant improvement in diarrhea. She is having 7-8 watery stools per day. She continues to be very fatigued and weak. She feels dehydrated.     Review of Systems   Constitutional:  Positive for activity change, appetite change, fatigue and unexpected weight change.   Gastrointestinal:  Positive for abdominal pain and diarrhea. Negative for blood in stool.   Neurological:  Positive for dizziness and weakness. Negative for syncope.   Psychiatric/Behavioral:  Negative for confusion.        Medical History:  Past Medical History:   Diagnosis Date    Abnormal ECG 8/5/2019    Aneurysm     Anticoagulant long-term use     Arthritis     CAD in native artery 8/5/2019    COPD (chronic obstructive pulmonary disease)     Diabetes mellitus     Fall 10/10/2019    Formatting of this note might be different from the original. Found sitting on floor next to bed last night Mild confusion today Does not recall falling or how she ended up on floor UA today Labs yesterday unremarkable    Glaucoma     Hypertension     Seizures     Shingles 05/27/2017    Stroke        Surgical History:   Past Surgical History:   Procedure Laterality Date    BRAIN SURGERY      CARDIAC CATHETERIZATION      CORONARY ANGIOPLASTY      EPIDURAL STEROID INJECTION INTO LUMBAR SPINE Bilateral 11/12/2019    Procedure: TF XANDER L4/5;  Surgeon: Desean Dias MD;  Location: [facility];  Service: Pain Management;  Laterality: Bilateral;    HYSTERECTOMY      INJECTION OF ANESTHETIC AGENT AROUND MEDIAL BRANCH NERVES INNERVATING LUMBAR FACET JOINT Bilateral  1/31/2020    Procedure: Bilateral L3-5 MBB;  Surgeon: Desean Dias MD;  [facility];  Service: Pain Management;  Laterality: Bilateral;    INJECTION OF ANESTHETIC AGENT INTO SACROILIAC JOINT Right 11/16/2021    Procedure: Right BLOCK, SACROILIAC JOINT Right GTB with RN IV sedation;  Surgeon: Yao Fulton MD;  [facility];  Service: Pain Management;  Laterality: Right;    INJECTION OF ANESTHETIC AGENT INTO SACROILIAC JOINT Bilateral 7/28/2023    Procedure: Bilateral GT bursa + bilateral SIJ injection;  Surgeon: Francisco Oshea MD;  [facility];  Service: Pain Management;  Laterality: Bilateral;    INJECTION OF JOINT Bilateral 7/9/2020    Procedure: Bilateral shoulder GH Joint injection with local;  Surgeon: Desean Dias MD;  [facility];  Service: Pain Management;  Laterality: Bilateral;    INJECTION OF JOINT Bilateral 7/28/2023    Procedure: Bilateral GT bursa + bilateral SIJ injection NEEDS ADDITIONAL SEDATION AND MORE TIME BEFORE INJECTION RN IV Sedation;  Surgeon: Francisco Oshea MD;  Location: [facility];  Service: Pain Management;  Laterality: Bilateral;    SELECTIVE INJECTION OF ANESTHETIC AGENT AROUND LUMBAR SPINAL NERVE ROOT BY TRANSFORAMINAL APPROACH Bilateral 4/26/2023    Procedure: Bilateral L4/5 TF XANDER RN IV Sedation;  Surgeon: Francisco Oshea MD;  [facility];  Service: Pain Management;  Laterality: Bilateral;    TRANSFORAMINAL EPIDURAL INJECTION OF STEROID Bilateral 7/23/2019    Procedure: Bilateral L3/4 Transforaminal Epidural Steroid Injection;  Surgeon: Desean Dias MD;  Location: [facility];  Service: Pain Management;  Laterality: Bilateral;    TRANSFORAMINAL EPIDURAL INJECTION OF STEROID Bilateral 3/10/2020    Procedure: Right T12/L1 TF XANDER with local;  Surgeon: Desean Dias MD;  [facility];  Service: Pain Management;  Laterality: Bilateral;    TRANSFORAMINAL EPIDURAL INJECTION OF STEROID Right 6/19/2020     Procedure: Right T12/L1 TFESI Covid day of procedure;  Surgeon: Desean Dias MD;  Location: [facility];  Service: Pain Management;  Laterality: Right;       Family History:   Family History   Problem Relation Age of Onset    No Known Problems Mother     No Known Problems Father        Social History:   Social History     Tobacco Use    Smoking status: Former     Current packs/day: 0.00     Average packs/day: 1 pack/day for 42.3 years (42.3 ttl pk-yrs)     Types: Cigarettes     Start date:      Quit date: 2004     Years since quittin.4    Smokeless tobacco: Never   Substance Use Topics    Alcohol use: No    Drug use: Never       Allergies:   Review of patient's allergies indicates:   Allergen Reactions    Penicillins Anaphylaxis, Hives, Shortness Of Breath and Swelling    Adhesive Rash    Doxycycline Nausea Only    Oxycodone Other (See Comments)     Fatigue      Sulfa (sulfonamide antibiotics) Itching       Home Medications:  Current Outpatient Medications on File Prior to Visit   Medication Sig Dispense Refill    albuterol (PROVENTIL) 2.5 mg /3 mL (0.083 %) nebulizer solution Take 2.5 mg by nebulization every 6 (six) hours as needed for Wheezing. Rescue      albuterol (PROVENTIL/VENTOLIN HFA) 90 mcg/actuation inhaler INHALE TWO PUFFS INTO THE LUNGS EVERY 4 HOURS AS NEEDED 18 g 11    APTIOM 400 mg Tab tablet Take 400 mg by mouth.      atorvastatin (LIPITOR) 40 MG tablet TAKE 1 TABLET BY MOUTH ONCE DAILY 90 tablet 1    carvediloL (COREG) 6.25 MG tablet Take 1 tablet (6.25 mg total) by mouth 2 (two) times daily with meals. 60 tablet 0    cholecalciferol, vitamin D3, 1,250 mcg (50,000 unit) capsule Take 1 capsule (50,000 Units total) by mouth every 7 days. 12 capsule 0    clopidogreL (PLAVIX) 75 mg tablet Take 1 tablet (75 mg total) by mouth once daily. 90 tablet 3    DALIRESP 500 mcg Tab TAKE 1 TABLET BY MOUTH ONCE DAILY 90 tablet 3    denosumab (PROLIA) 60 mg/mL Syrg every 6 (six) months.       dicyclomine (BENTYL) 10 MG capsule Take 1 capsule (10 mg total) by mouth 2 (two) times daily. 10 capsule 0    diltiaZEM (CARDIZEM CD) 120 MG Cp24 Take 2 capsules (240 mg total) by mouth once daily. 180 capsule 1    EScitalopram oxalate (LEXAPRO) 20 MG tablet TAKE 1 TABLET BY MOUTH ONCE DAILY 90 tablet 1    fluticasone propionate (FLONASE) 50 mcg/actuation nasal spray USE 2 SPRAYS INTO EACH NOSTRIL ONCE A DAY AT 6AM AS DIRECTED 16 g 11    fluticasone-umeclidin-vilanter (TRELEGY ELLIPTA) 200-62.5-25 mcg inhaler INHALE 1 PUFF BY MOUTH ONCE A DAY 60 each 11    furosemide (LASIX) 20 MG tablet Take 20 mg by mouth daily as needed.      gabapentin (NEURONTIN) 300 MG capsule Take 1 capsule (300 mg total) by mouth 3 (three) times daily. 90 capsule 1    ipratropium (ATROVENT) 42 mcg (0.06 %) nasal spray USE 2 SPRAYS INTO EACH NOSTRIL FOUR TIMES DAILY 15 mL 11    isosorbide mononitrate (IMDUR) 60 MG 24 hr tablet Take 1 tablet (60 mg total) by mouth every evening. 90 tablet 1    ketoconazole (NIZORAL) 2 % cream Apply topically 2 (two) times daily. For dry flaky patches of ear. 30 g 1    latanoprost 0.005 % ophthalmic solution Place 1 drop into both eyes every evening. 2.5 mL 12    levETIRAcetam (KEPPRA) 500 MG Tab Take 1 tablet (500 mg total) by mouth 2 (two) times daily. 180 tablet 3    magnesium oxide (MAG-OX) 400 mg (241.3 mg magnesium) tablet Take 1 tablet (400 mg total) by mouth once daily. 90 tablet 1    montelukast (SINGULAIR) 10 mg tablet TAKE 1 TABLET BY MOUTH ONCE A DAY 90 tablet 3    nabumetone (RELAFEN) 500 MG tablet Take 1 tablet (500 mg total) by mouth 2 (two) times daily. 60 tablet 2    nitroGLYCERIN (NITROSTAT) 0.4 MG SL tablet DISSOLVE 1 TABLET UNDER TONGUE EVERY 5 MINUTES FOR CHEST PAIN (MAY TAKE UP TO 3 DOSES THEN SEEK MEDICAL ATTENTION) 25 tablet 0    ondansetron (ZOFRAN-ODT) 4 MG TbDL Take 1 tablet (4 mg total) by mouth every 6 (six) hours as needed. 30 tablet 0    pantoprazole (PROTONIX) 40 MG tablet Take  "40 mg by mouth 2 (two) times daily.      potassium chloride SA (K-DUR,KLOR-CON) 20 MEQ tablet Take 1 tablet (20 mEq total) by mouth once daily. Take extra dose with Lasix - fluid pill 90 tablet 1    ranolazine (RANEXA) 1,000 mg Tb12 TAKE 1 TABLET BY MOUTH TWICE DAILY 180 tablet 3    dorzolamide-timolol 2-0.5% (COSOPT) 22.3-6.8 mg/mL ophthalmic solution Place 1 drop into both eyes every 12 (twelve) hours. (Patient not taking: Reported on 8/18/2023) 10 mL 12    MYRBETRIQ 50 mg Tb24 TAKE 1 TABLET BY MOUTH ONCE DAILY 30 tablet 0    nortriptyline (PAMELOR) 25 MG capsule Take 1 capsule (25 mg total) by mouth every evening. 90 capsule 0    triamcinolone acetonide 0.025% (KENALOG) 0.025 % cream Apply topically 2 (two) times daily. PRN rash and itching of ear. Mild steroid cream. (Patient not taking: Reported on 8/18/2023) 30 g 0     No current facility-administered medications on file prior to visit.       /62 (BP Location: Left arm, Patient Position: Sitting, BP Method: Medium (Manual))   Pulse 83   Ht 5' 4" (1.626 m)   Wt 50 kg (110 lb 3.7 oz)   LMP  (LMP Unknown)   BMI 18.92 kg/m²   Body mass index is 18.92 kg/m².  Physical Exam  Constitutional:       General: She is not in acute distress.     Appearance: She is ill-appearing.   Abdominal:      General: Bowel sounds are increased. There is no distension.      Palpations: Abdomen is soft.   Neurological:      Mental Status: She is alert.       Labs: Pertinent labs reviewed.  CRC Screening: due     Assessment:   1. Infectious diarrhea    2. Weight loss    3. Salmonella    I am concerned given her persistent diarrhea despite treatment for salmonella.     Recommendations:   Repeat stool tests  Trial of cholestyramine   Will need colonoscopy-- need to r/o persistent infection first.     Infectious diarrhea  -     cholestyramine (QUESTRAN) 4 gram packet; Take 1 packet (4 g total) by mouth 2 (two) times daily.  Dispense: 60 packet; Refill: 2  -     Clostridium " difficile EIA; Future; Expected date: 08/31/2023  -     Stool culture; Future; Expected date: 08/31/2023  -     Giardia / Cryptosporidum, EIA; Future; Expected date: 08/31/2023  -     Stool Exam-Ova,Cysts,Parasites; Future; Expected date: 08/31/2023  -     WBC, Stool; Future; Expected date: 08/31/2023    Weight loss    Salmonella    Return to Clinic:  Follow up to be determined after results/ procedure(s).    Thank you for the opportunity to participate in the care of this patient.  MAUREEN Cheung

## 2023-09-01 LAB
C DIFF GDH STL QL: NEGATIVE
C DIFF TOX A+B STL QL IA: NEGATIVE
CRYPTOSP AG STL QL IA: NEGATIVE
G LAMBLIA AG STL QL IA: NEGATIVE
WBC #/AREA STL HPF: NORMAL /[HPF]

## 2023-09-03 LAB
BACTERIA STL CULT: NORMAL
BACTERIA STL CULT: NORMAL

## 2023-09-05 DIAGNOSIS — R19.7 DIARRHEA, UNSPECIFIED TYPE: Primary | ICD-10-CM

## 2023-09-05 DIAGNOSIS — R63.4 WEIGHT LOSS: ICD-10-CM

## 2023-09-05 RX ORDER — POLYETHYLENE GLYCOL 3350, SODIUM SULFATE ANHYDROUS, SODIUM BICARBONATE, SODIUM CHLORIDE, POTASSIUM CHLORIDE 236; 22.74; 6.74; 5.86; 2.97 G/4L; G/4L; G/4L; G/4L; G/4L
4 POWDER, FOR SOLUTION ORAL ONCE
Qty: 4000 ML | Refills: 0 | Status: SHIPPED | OUTPATIENT
Start: 2023-09-05 | End: 2023-09-05

## 2023-09-06 ENCOUNTER — LAB VISIT (OUTPATIENT)
Dept: LAB | Facility: HOSPITAL | Age: 78
End: 2023-09-06
Attending: PEDIATRICS
Payer: MEDICARE

## 2023-09-06 DIAGNOSIS — D64.9 ANEMIA, UNSPECIFIED: ICD-10-CM

## 2023-09-06 DIAGNOSIS — I50.32 CHRONIC DIASTOLIC HEART FAILURE: ICD-10-CM

## 2023-09-06 DIAGNOSIS — R19.7 DIARRHEA OF PRESUMED INFECTIOUS ORIGIN: ICD-10-CM

## 2023-09-06 DIAGNOSIS — E87.6 HYPOPOTASSEMIA: Primary | ICD-10-CM

## 2023-09-06 DIAGNOSIS — N17.9 ACUTE KIDNEY FAILURE, UNSPECIFIED: ICD-10-CM

## 2023-09-06 DIAGNOSIS — E11.29 TYPE II DIABETES MELLITUS WITH RENAL MANIFESTATIONS: ICD-10-CM

## 2023-09-06 LAB
ANION GAP SERPL CALC-SCNC: 15 MMOL/L (ref 8–16)
BASOPHILS # BLD AUTO: 0.02 K/UL (ref 0–0.2)
BASOPHILS NFR BLD: 0.4 % (ref 0–1.9)
BUN SERPL-MCNC: 10 MG/DL (ref 8–23)
CALCIUM SERPL-MCNC: 8.1 MG/DL (ref 8.7–10.5)
CHLORIDE SERPL-SCNC: 105 MMOL/L (ref 95–110)
CO2 SERPL-SCNC: 16 MMOL/L (ref 23–29)
CREAT SERPL-MCNC: 0.9 MG/DL (ref 0.5–1.4)
DIFFERENTIAL METHOD: ABNORMAL
EOSINOPHIL # BLD AUTO: 0.2 K/UL (ref 0–0.5)
EOSINOPHIL NFR BLD: 3 % (ref 0–8)
ERYTHROCYTE [DISTWIDTH] IN BLOOD BY AUTOMATED COUNT: 14.5 % (ref 11.5–14.5)
EST. GFR  (NO RACE VARIABLE): >60 ML/MIN/1.73 M^2
GLUCOSE SERPL-MCNC: 78 MG/DL (ref 70–110)
HCT VFR BLD AUTO: 28.8 % (ref 37–48.5)
HGB BLD-MCNC: 9.3 G/DL (ref 12–16)
IMM GRANULOCYTES # BLD AUTO: 0.04 K/UL (ref 0–0.04)
IMM GRANULOCYTES NFR BLD AUTO: 0.8 % (ref 0–0.5)
LYMPHOCYTES # BLD AUTO: 1.2 K/UL (ref 1–4.8)
LYMPHOCYTES NFR BLD: 23.2 % (ref 18–48)
MCH RBC QN AUTO: 30.1 PG (ref 27–31)
MCHC RBC AUTO-ENTMCNC: 32.3 G/DL (ref 32–36)
MCV RBC AUTO: 93 FL (ref 82–98)
MONOCYTES # BLD AUTO: 0.4 K/UL (ref 0.3–1)
MONOCYTES NFR BLD: 8.1 % (ref 4–15)
NEUTROPHILS # BLD AUTO: 3.2 K/UL (ref 1.8–7.7)
NEUTROPHILS NFR BLD: 64.5 % (ref 38–73)
NRBC BLD-RTO: 0 /100 WBC
PLATELET # BLD AUTO: 290 K/UL (ref 150–450)
PMV BLD AUTO: 9.3 FL (ref 9.2–12.9)
POTASSIUM SERPL-SCNC: 4 MMOL/L (ref 3.5–5.1)
RBC # BLD AUTO: 3.09 M/UL (ref 4–5.4)
SODIUM SERPL-SCNC: 136 MMOL/L (ref 136–145)
WBC # BLD AUTO: 4.95 K/UL (ref 3.9–12.7)

## 2023-09-06 PROCEDURE — 80048 BASIC METABOLIC PNL TOTAL CA: CPT | Performed by: PEDIATRICS

## 2023-09-06 PROCEDURE — 85025 COMPLETE CBC W/AUTO DIFF WBC: CPT | Performed by: PEDIATRICS

## 2023-09-07 LAB — O+P STL MICRO: NORMAL

## 2023-09-08 ENCOUNTER — TELEPHONE (OUTPATIENT)
Dept: HEMATOLOGY/ONCOLOGY | Facility: CLINIC | Age: 78
End: 2023-09-08
Payer: MEDICARE

## 2023-09-08 ENCOUNTER — E-CONSULT (OUTPATIENT)
Dept: CARDIOLOGY | Facility: CLINIC | Age: 78
End: 2023-09-08
Payer: MEDICARE

## 2023-09-08 ENCOUNTER — HOSPITAL ENCOUNTER (OUTPATIENT)
Dept: PREADMISSION TESTING | Facility: HOSPITAL | Age: 78
Discharge: HOME | End: 2023-09-08
Attending: INTERNAL MEDICINE
Payer: MEDICARE

## 2023-09-08 DIAGNOSIS — R19.7 DIARRHEA, UNSPECIFIED TYPE: ICD-10-CM

## 2023-09-08 DIAGNOSIS — D50.0 IRON DEFICIENCY ANEMIA DUE TO CHRONIC BLOOD LOSS: Primary | ICD-10-CM

## 2023-09-08 DIAGNOSIS — I25.118 CORONARY ARTERY DISEASE OF NATIVE ARTERY OF NATIVE HEART WITH STABLE ANGINA PECTORIS: ICD-10-CM

## 2023-09-08 DIAGNOSIS — Z01.810 PRE-OPERATIVE CARDIOVASCULAR EXAMINATION: Primary | ICD-10-CM

## 2023-09-08 DIAGNOSIS — R63.4 WEIGHT LOSS: ICD-10-CM

## 2023-09-08 PROCEDURE — 99451 PR INTERPROF, PHONE/INTERNET/EHR, CONSULT, >= 5MINS: ICD-10-PCS | Mod: S$GLB,,, | Performed by: INTERNAL MEDICINE

## 2023-09-08 PROCEDURE — 99451 NTRPROF PH1/NTRNET/EHR 5/>: CPT | Mod: S$GLB,,, | Performed by: INTERNAL MEDICINE

## 2023-09-08 NOTE — TELEPHONE ENCOUNTER
----- Message from Dayan Lofton sent at 9/8/2023 12:54 PM CDT -----  Contact: Jetty  Patient is calling to speak with the nurse in regards to appt. Reports starting to have issues again and needing to schedule a check up. Please give patient a callback at [phone].

## 2023-09-08 NOTE — CONSULTS
[facility] - Cardiology  Response for E-Consult     Patient Name: Shireen Felix  MRN: [id number]  Primary Care Provider: Laron Chaudhari MD   Requesting Provider: Emi Jenkins NP  E-Consult to Cardiology  Consult performed by: Mateo Russell MD  Consult ordered by: Emi Jenkins NP          Chart reviewed personally     No evidence in Epic of PCI within the last 12 months.      As long as no significant chest pain or shortness of breath with exertion, patient is at acceptable risk to proceed from a Cardiology standpoint.    Okay to hold Plavix 5 days prior to procedure, restart as soon as safe postprocedure.      Total time of Consultation: 10 minute    I did not speak to the requesting provider verbally about this.     *This eConsult is based on the clinical data available to me and is furnished without benefit of a physical examination. The eConsult will need to be interpreted in light of any clinical issues or changes in patient status not available to me at the time of filing this eConsults. Significant changes in patient condition or level of acuity should result in immediate formal consultation and reevaluation. Please alert me if you have further questions.    Thank you for this eConsult referral.     MD Kirill Cruz  Cardiology

## 2023-09-08 NOTE — TELEPHONE ENCOUNTER
Mary Ms. Ty, I returned the pt call and she stated that, she had an infection on her intestine and has been treated for it, but her blood count is down, she is anemic, her iron fluctuates, has been feeling weak, wanted to get an appt with you?. Pt was  seen last  2020, should I go ahead and schedule her an appt?, thank ma'am.

## 2023-09-11 ENCOUNTER — TELEPHONE (OUTPATIENT)
Dept: CARDIOLOGY | Facility: CLINIC | Age: 78
End: 2023-09-11
Payer: MEDICARE

## 2023-09-11 ENCOUNTER — TELEPHONE (OUTPATIENT)
Dept: HEMATOLOGY/ONCOLOGY | Facility: CLINIC | Age: 78
End: 2023-09-11
Payer: MEDICARE

## 2023-09-11 NOTE — TELEPHONE ENCOUNTER
Called and spoke with pt brother in regards to scheduling an hfu appt. Pt scheduled for Friday 9/15/23 at 8:30AM w/ Indira Schumacher. Pt also scheduled an appt with  just to be on his schedule just in case she needs to see him as well.     ----- Message from Gillian Verdin sent at 9/8/2023  4:27 PM CDT -----  Contact: [phone]  Type:  Sooner Apoointment Request    Caller is requesting a sooner appointment.  Caller declined first available appointment listed below.  Caller will not accept being placed on the waitlist and is requesting a message be sent to doctor.  Name of Caller:Shireen   When is the first available appointment?oct 2023  Symptoms:hospital follow up  Would the patient rather a call back or a response via [employer]? Call back   Best Call Back Number:[phone]  Additional Information: n/a      Thanks KB

## 2023-09-11 NOTE — TELEPHONE ENCOUNTER
Pt contacted to inform that she is already on the schedule tomorrow 09/12/2023 to see Ms. Solomon, verbalized understanding.

## 2023-09-11 NOTE — TELEPHONE ENCOUNTER
----- Message from Abdiel Willard sent at 9/11/2023  9:29 AM CDT -----  Contact: Normanty  Patient is requesting a call back regarding iron infusion and appt. Please call back at [phone]

## 2023-09-12 ENCOUNTER — LAB VISIT (OUTPATIENT)
Dept: LAB | Facility: HOSPITAL | Age: 78
End: 2023-09-12
Attending: INTERNAL MEDICINE
Payer: MEDICARE

## 2023-09-12 ENCOUNTER — OFFICE VISIT (OUTPATIENT)
Dept: HEMATOLOGY/ONCOLOGY | Facility: CLINIC | Age: 78
End: 2023-09-12
Payer: MEDICARE

## 2023-09-12 VITALS
BODY MASS INDEX: 18.44 KG/M2 | WEIGHT: 108 LBS | HEART RATE: 78 BPM | OXYGEN SATURATION: 94 % | TEMPERATURE: 97 F | SYSTOLIC BLOOD PRESSURE: 115 MMHG | HEIGHT: 64 IN | DIASTOLIC BLOOD PRESSURE: 60 MMHG

## 2023-09-12 DIAGNOSIS — D50.0 IRON DEFICIENCY ANEMIA DUE TO CHRONIC BLOOD LOSS: Primary | ICD-10-CM

## 2023-09-12 DIAGNOSIS — D64.9 ANEMIA, UNSPECIFIED TYPE: ICD-10-CM

## 2023-09-12 DIAGNOSIS — D53.9 NUTRITIONAL ANEMIA, UNSPECIFIED: ICD-10-CM

## 2023-09-12 DIAGNOSIS — D50.0 IRON DEFICIENCY ANEMIA DUE TO CHRONIC BLOOD LOSS: ICD-10-CM

## 2023-09-12 DIAGNOSIS — D50.8 OTHER IRON DEFICIENCY ANEMIA: ICD-10-CM

## 2023-09-12 LAB
BASOPHILS # BLD AUTO: 0.04 K/UL (ref 0–0.2)
BASOPHILS NFR BLD: 0.5 % (ref 0–1.9)
DIFFERENTIAL METHOD: ABNORMAL
EOSINOPHIL # BLD AUTO: 0.2 K/UL (ref 0–0.5)
EOSINOPHIL NFR BLD: 2.3 % (ref 0–8)
ERYTHROCYTE [DISTWIDTH] IN BLOOD BY AUTOMATED COUNT: 14.3 % (ref 11.5–14.5)
FERRITIN SERPL-MCNC: 643 NG/ML (ref 20–300)
HCT VFR BLD AUTO: 37.3 % (ref 37–48.5)
HGB BLD-MCNC: 12 G/DL (ref 12–16)
IMM GRANULOCYTES # BLD AUTO: 0.05 K/UL (ref 0–0.04)
IMM GRANULOCYTES NFR BLD AUTO: 0.7 % (ref 0–0.5)
IRON SERPL-MCNC: 76 UG/DL (ref 30–160)
LYMPHOCYTES # BLD AUTO: 1.2 K/UL (ref 1–4.8)
LYMPHOCYTES NFR BLD: 16.7 % (ref 18–48)
MCH RBC QN AUTO: 29.7 PG (ref 27–31)
MCHC RBC AUTO-ENTMCNC: 32.2 G/DL (ref 32–36)
MCV RBC AUTO: 92 FL (ref 82–98)
MONOCYTES # BLD AUTO: 0.6 K/UL (ref 0.3–1)
MONOCYTES NFR BLD: 7.9 % (ref 4–15)
NEUTROPHILS # BLD AUTO: 5.3 K/UL (ref 1.8–7.7)
NEUTROPHILS NFR BLD: 71.9 % (ref 38–73)
NRBC BLD-RTO: 0 /100 WBC
PLATELET # BLD AUTO: 290 K/UL (ref 150–450)
PMV BLD AUTO: 9 FL (ref 9.2–12.9)
RBC # BLD AUTO: 4.04 M/UL (ref 4–5.4)
SATURATED IRON: 28 % (ref 20–50)
TOTAL IRON BINDING CAPACITY: 275 UG/DL (ref 250–450)
TRANSFERRIN SERPL-MCNC: 186 MG/DL (ref 200–375)
WBC # BLD AUTO: 7.38 K/UL (ref 3.9–12.7)

## 2023-09-12 PROCEDURE — 99999 PR PBB SHADOW E&M-EST. PATIENT-LVL V: ICD-10-PCS | Mod: PBBFAC,,, | Performed by: NURSE PRACTITIONER

## 2023-09-12 PROCEDURE — 36415 COLL VENOUS BLD VENIPUNCTURE: CPT | Performed by: NURSE PRACTITIONER

## 2023-09-12 PROCEDURE — 99214 PR OFFICE/OUTPT VISIT, EST, LEVL IV, 30-39 MIN: ICD-10-PCS | Mod: S$GLB,,, | Performed by: NURSE PRACTITIONER

## 2023-09-12 PROCEDURE — 1125F AMNT PAIN NOTED PAIN PRSNT: CPT | Mod: CPTII,S$GLB,, | Performed by: NURSE PRACTITIONER

## 2023-09-12 PROCEDURE — 3288F FALL RISK ASSESSMENT DOCD: CPT | Mod: CPTII,S$GLB,, | Performed by: NURSE PRACTITIONER

## 2023-09-12 PROCEDURE — 99999 PR PBB SHADOW E&M-EST. PATIENT-LVL V: CPT | Mod: PBBFAC,,, | Performed by: NURSE PRACTITIONER

## 2023-09-12 PROCEDURE — 1160F PR REVIEW ALL MEDS BY PRESCRIBER/CLIN PHARMACIST DOCUMENTED: ICD-10-PCS | Mod: CPTII,S$GLB,, | Performed by: NURSE PRACTITIONER

## 2023-09-12 PROCEDURE — 99214 OFFICE O/P EST MOD 30 MIN: CPT | Mod: S$GLB,,, | Performed by: NURSE PRACTITIONER

## 2023-09-12 PROCEDURE — 84466 ASSAY OF TRANSFERRIN: CPT | Performed by: NURSE PRACTITIONER

## 2023-09-12 PROCEDURE — 85025 COMPLETE CBC W/AUTO DIFF WBC: CPT | Performed by: NURSE PRACTITIONER

## 2023-09-12 PROCEDURE — 1160F RVW MEDS BY RX/DR IN RCRD: CPT | Mod: CPTII,S$GLB,, | Performed by: NURSE PRACTITIONER

## 2023-09-12 PROCEDURE — 1159F PR MEDICATION LIST DOCUMENTED IN MEDICAL RECORD: ICD-10-PCS | Mod: CPTII,S$GLB,, | Performed by: NURSE PRACTITIONER

## 2023-09-12 PROCEDURE — 1100F PTFALLS ASSESS-DOCD GE2>/YR: CPT | Mod: CPTII,S$GLB,, | Performed by: NURSE PRACTITIONER

## 2023-09-12 PROCEDURE — 3078F PR MOST RECENT DIASTOLIC BLOOD PRESSURE < 80 MM HG: ICD-10-PCS | Mod: CPTII,S$GLB,, | Performed by: NURSE PRACTITIONER

## 2023-09-12 PROCEDURE — 1125F PR PAIN SEVERITY QUANTIFIED, PAIN PRESENT: ICD-10-PCS | Mod: CPTII,S$GLB,, | Performed by: NURSE PRACTITIONER

## 2023-09-12 PROCEDURE — 3078F DIAST BP <80 MM HG: CPT | Mod: CPTII,S$GLB,, | Performed by: NURSE PRACTITIONER

## 2023-09-12 PROCEDURE — 1100F PR PT FALLS ASSESS DOC 2+ FALLS/FALL W/INJURY/YR: ICD-10-PCS | Mod: CPTII,S$GLB,, | Performed by: NURSE PRACTITIONER

## 2023-09-12 PROCEDURE — 3074F SYST BP LT 130 MM HG: CPT | Mod: CPTII,S$GLB,, | Performed by: NURSE PRACTITIONER

## 2023-09-12 PROCEDURE — 82728 ASSAY OF FERRITIN: CPT | Performed by: NURSE PRACTITIONER

## 2023-09-12 PROCEDURE — 3074F PR MOST RECENT SYSTOLIC BLOOD PRESSURE < 130 MM HG: ICD-10-PCS | Mod: CPTII,S$GLB,, | Performed by: NURSE PRACTITIONER

## 2023-09-12 PROCEDURE — 1159F MED LIST DOCD IN RCRD: CPT | Mod: CPTII,S$GLB,, | Performed by: NURSE PRACTITIONER

## 2023-09-12 PROCEDURE — 3288F PR FALLS RISK ASSESSMENT DOCUMENTED: ICD-10-PCS | Mod: CPTII,S$GLB,, | Performed by: NURSE PRACTITIONER

## 2023-09-12 PROCEDURE — 83540 ASSAY OF IRON: CPT | Performed by: NURSE PRACTITIONER

## 2023-09-12 NOTE — ASSESSMENT & PLAN NOTE
Today's CBC show resolution of anemia with hg 12.0    Iron studies pending. Will follow up and replete PRN. We discussed the most likely cause of her acute anemia was secondary to her illness. Now recovering    Will arrange 1 month f/u with cbc, iron, ferritin, bmp. Sooner f/u PRN. Upcoming Colonoscopy per patient

## 2023-09-12 NOTE — PROGRESS NOTES
Subjective:       Patient ID: Shireen Felix is a 77 y.o. female.    Chief Complaint: f/u h/o easy bruising and anemia    HPI: 77 y.o female with h/o history of brain aneurysm status post 2 brain surgeries in  and , history of provoked proximal lower extremity DVT  treated with short-term anticoagulation, diabetes type 2, hypertension, frequent falls, arthritis, COPD, seizures who was originally referred to us for evaluation of spontaneous ecchymosis. Workup revealed normal PT/PTT. significant iron deficiency with saturated iron level 2%, TIBC 494, Ferritin 17. CBC was significant for microcytic anemia with hemoglobin 7.4.  She was infused IV iron x 2 doses 2020. She denies GI evaluation. Eventually she had further workup for c/o bruising. SPEP normal. FLC revealed elevated kappa/lamda with normal ratio. YANNICK showed faint oligoclonal banding pattern. Von Willebrand disorder was ruled out. Patient continued follow up for her anemia but has been lost to follow up since     Patient presents today for follow up due to recent recurrent anemia which onset 2023 during time of GI illness, dehydration, UTI. Patient hospitalized during this time treated for salmonella infectious diarrhea, UTI, HARESH, electrolyte abnormalities. Of note she did have CTA to r/o GI bleed in hospital. No evidence of GI bleed. She notes weakness and fatigue but feels better today    Hospital notes reviewed  Social History     Socioeconomic History    Marital status: Single   Tobacco Use    Smoking status: Former     Current packs/day: 0.00     Average packs/day: 1 pack/day for 42.3 years (42.3 ttl pk-yrs)     Types: Cigarettes     Start date:      Quit date: 2004     Years since quittin.4    Smokeless tobacco: Never   Substance and Sexual Activity    Alcohol use: No    Drug use: Never    Sexual activity: Not Currently     Partners: Male   Social History Narrative    No pets or smokers in household.      Social Determinants of Health     Stress: Stress Concern Present (8/20/2020)    [employer] - Occupational Stress Questionnaire     Feeling of Stress : Very much       Past Medical History:   Diagnosis Date    Abnormal ECG 8/5/2019    Aneurysm     Anticoagulant long-term use     Arthritis     CAD in native artery 8/5/2019    COPD (chronic obstructive pulmonary disease)     Diabetes mellitus     Fall 10/10/2019    Formatting of this note might be different from the original. Found sitting on floor next to bed last night Mild confusion today Does not recall falling or how she ended up on floor UA today Labs yesterday unremarkable    Glaucoma     Hypertension     Seizures     Shingles 05/27/2017    Stroke        Family History   Problem Relation Age of Onset    No Known Problems Mother     No Known Problems Father        Past Surgical History:   Procedure Laterality Date    BRAIN SURGERY      CARDIAC CATHETERIZATION      CORONARY ANGIOPLASTY      EPIDURAL STEROID INJECTION INTO LUMBAR SPINE Bilateral 11/12/2019    Procedure: TF XANDER L4/5;  Surgeon: Desean Dias MD;  Location: [facility];  Service: Pain Management;  Laterality: Bilateral;    HYSTERECTOMY      INJECTION OF ANESTHETIC AGENT AROUND MEDIAL BRANCH NERVES INNERVATING LUMBAR FACET JOINT Bilateral 1/31/2020    Procedure: Bilateral L3-5 MBB;  Surgeon: Desean Dias MD;  Location: [facility];  Service: Pain Management;  Laterality: Bilateral;    INJECTION OF ANESTHETIC AGENT INTO SACROILIAC JOINT Right 11/16/2021    Procedure: Right BLOCK, SACROILIAC JOINT Right GTB with RN IV sedation;  Surgeon: Yao Fulton MD;  Location: [facility];  Service: Pain Management;  Laterality: Right;    INJECTION OF ANESTHETIC AGENT INTO SACROILIAC JOINT Bilateral 7/28/2023    Procedure: Bilateral GT bursa + bilateral SIJ injection;  Surgeon: Francisco Oshea MD;  Location: [facility];  Service: Pain  Management;  Laterality: Bilateral;    INJECTION OF JOINT Bilateral 7/9/2020    Procedure: Bilateral shoulder GH Joint injection with local;  Surgeon: Desean Dias MD;  Location: [facility];  Service: Pain Management;  Laterality: Bilateral;    INJECTION OF JOINT Bilateral 7/28/2023    Procedure: Bilateral GT bursa + bilateral SIJ injection NEEDS ADDITIONAL SEDATION AND MORE TIME BEFORE INJECTION RN IV Sedation;  Surgeon: Francisco Oshea MD;  Location: [facility];  Service: Pain Management;  Laterality: Bilateral;    SELECTIVE INJECTION OF ANESTHETIC AGENT AROUND LUMBAR SPINAL NERVE ROOT BY TRANSFORAMINAL APPROACH Bilateral 4/26/2023    Procedure: Bilateral L4/5 TF XANDER RN IV Sedation;  Surgeon: Francisco Oshea MD;  [facility]: [facility];  Service: Pain Management;  Laterality: Bilateral;    TRANSFORAMINAL EPIDURAL INJECTION OF STEROID Bilateral 7/23/2019    Procedure: Bilateral L3/4 Transforaminal Epidural Steroid Injection;  Surgeon: Desean Dias MD;  Location: [facility];  Service: Pain Management;  Laterality: Bilateral;    TRANSFORAMINAL EPIDURAL INJECTION OF STEROID Bilateral 3/10/2020    Procedure: Right T12/L1 TF XANDER with local;  Surgeon: Desean Dias MD;  Location: [facility];  Service: Pain Management;  Laterality: Bilateral;    TRANSFORAMINAL EPIDURAL INJECTION OF STEROID Right 6/19/2020    Procedure: Right T12/L1 TFESI Covid day of procedure;  Surgeon: Desean Dias MD;  [facility]: [facility];  Service: Pain Management;  Laterality: Right;       Review of Systems   Constitutional:  Positive for fatigue. Negative for activity change, appetite change, chills, diaphoresis, fever and unexpected weight change.   HENT:  Negative for congestion, mouth sores, nosebleeds, sore throat, trouble swallowing and voice change.    Eyes:  Negative for visual disturbance.   Respiratory:  Negative for cough, chest tightness, shortness of breath (intermittent) and wheezing.     Cardiovascular:  Negative for chest pain and leg swelling.   Gastrointestinal:  Negative for abdominal distention, abdominal pain, anal bleeding, blood in stool, constipation, diarrhea, nausea and vomiting.   Genitourinary:  Negative for difficulty urinating, dysuria and hematuria.   Musculoskeletal:  Positive for arthralgias and back pain. Negative for myalgias.   Skin:  Negative for pallor, rash and wound.   Neurological:  Positive for weakness. Negative for dizziness, seizures, syncope and headaches.   Hematological:  Negative for adenopathy. Does not bruise/bleed easily.   Psychiatric/Behavioral:  The patient is not nervous/anxious.          Medication List with Changes/Refills   Current Medications    ALBUTEROL (PROVENTIL) 2.5 MG /3 ML (0.083 %) NEBULIZER SOLUTION    Take 2.5 mg by nebulization every 6 (six) hours as needed for Wheezing. Rescue    ALBUTEROL (PROVENTIL/VENTOLIN HFA) 90 MCG/ACTUATION INHALER    INHALE TWO PUFFS INTO THE LUNGS EVERY 4 HOURS AS NEEDED    APTIOM 400 MG TAB TABLET    Take 400 mg by mouth.    ATORVASTATIN (LIPITOR) 40 MG TABLET    TAKE 1 TABLET BY MOUTH ONCE DAILY    CARVEDILOL (COREG) 6.25 MG TABLET    Take 1 tablet (6.25 mg total) by mouth 2 (two) times daily with meals.    CHOLECALCIFEROL, VITAMIN D3, 1,250 MCG (50,000 UNIT) CAPSULE    Take 1 capsule (50,000 Units total) by mouth every 7 days.    CHOLESTYRAMINE (QUESTRAN) 4 GRAM PACKET    Take 1 packet (4 g total) by mouth 2 (two) times daily.    CLOPIDOGREL (PLAVIX) 75 MG TABLET    Take 1 tablet (75 mg total) by mouth once daily.    DALIRESP 500 MCG TAB    TAKE 1 TABLET BY MOUTH ONCE DAILY    DENOSUMAB (PROLIA) 60 MG/ML SYRG    every 6 (six) months.    DICYCLOMINE (BENTYL) 10 MG CAPSULE    Take 1 capsule (10 mg total) by mouth 2 (two) times daily.    DILTIAZEM (CARDIZEM CD) 120 MG CP24    Take 2 capsules (240 mg total) by mouth once daily.    DORZOLAMIDE-TIMOLOL 2-0.5% (COSOPT) 22.3-6.8 MG/ML OPHTHALMIC SOLUTION    Place 1 drop  into both eyes every 12 (twelve) hours.    ESCITALOPRAM OXALATE (LEXAPRO) 20 MG TABLET    TAKE 1 TABLET BY MOUTH ONCE DAILY    FLUTICASONE PROPIONATE (FLONASE) 50 MCG/ACTUATION NASAL SPRAY    USE 2 SPRAYS INTO EACH NOSTRIL ONCE A DAY AT 6AM AS DIRECTED    FLUTICASONE-UMECLIDIN-VILANTER (TRELEGY ELLIPTA) 200-62.5-25 MCG INHALER    INHALE 1 PUFF BY MOUTH ONCE A DAY    FUROSEMIDE (LASIX) 20 MG TABLET    Take 20 mg by mouth daily as needed.    GABAPENTIN (NEURONTIN) 300 MG CAPSULE    Take 1 capsule (300 mg total) by mouth 3 (three) times daily.    IPRATROPIUM (ATROVENT) 42 MCG (0.06 %) NASAL SPRAY    USE 2 SPRAYS INTO EACH NOSTRIL FOUR TIMES DAILY    ISOSORBIDE MONONITRATE (IMDUR) 60 MG 24 HR TABLET    Take 1 tablet (60 mg total) by mouth every evening.    KETOCONAZOLE (NIZORAL) 2 % CREAM    Apply topically 2 (two) times daily. For dry flaky patches of ear.    LATANOPROST 0.005 % OPHTHALMIC SOLUTION    Place 1 drop into both eyes every evening.    LEVETIRACETAM (KEPPRA) 500 MG TAB    Take 1 tablet (500 mg total) by mouth 2 (two) times daily.    MAGNESIUM OXIDE (MAG-OX) 400 MG (241.3 MG MAGNESIUM) TABLET    Take 1 tablet (400 mg total) by mouth once daily.    MONTELUKAST (SINGULAIR) 10 MG TABLET    TAKE 1 TABLET BY MOUTH ONCE A DAY    MYRBETRIQ 50 MG TB24    TAKE 1 TABLET BY MOUTH ONCE DAILY    NABUMETONE (RELAFEN) 500 MG TABLET    Take 1 tablet (500 mg total) by mouth 2 (two) times daily.    NITROGLYCERIN (NITROSTAT) 0.4 MG SL TABLET    DISSOLVE 1 TABLET UNDER TONGUE EVERY 5 MINUTES FOR CHEST PAIN (MAY TAKE UP TO 3 DOSES THEN SEEK MEDICAL ATTENTION)    NORTRIPTYLINE (PAMELOR) 25 MG CAPSULE    Take 1 capsule (25 mg total) by mouth every evening.    ONDANSETRON (ZOFRAN-ODT) 4 MG TBDL    Take 1 tablet (4 mg total) by mouth every 6 (six) hours as needed.    PANTOPRAZOLE (PROTONIX) 40 MG TABLET    Take 40 mg by mouth 2 (two) times daily.    POTASSIUM CHLORIDE SA (K-DUR,KLOR-CON) 20 MEQ TABLET    Take 1 tablet (20 mEq total)  by mouth once daily. Take extra dose with Lasix - fluid pill    RANOLAZINE (RANEXA) 1,000 MG TB12    TAKE 1 TABLET BY MOUTH TWICE DAILY    TRIAMCINOLONE ACETONIDE 0.025% (KENALOG) 0.025 % CREAM    Apply topically 2 (two) times daily. PRN rash and itching of ear. Mild steroid cream.     Objective:     Vitals:    09/12/23 1013   BP: 115/60   Pulse: 78   Temp: 97.4 °F (36.3 °C)     Lab Results   Component Value Date    WBC 7.38 09/12/2023    HGB 12.0 09/12/2023    HCT 37.3 09/12/2023    MCV 92 09/12/2023     09/12/2023     BMP  Lab Results   Component Value Date     09/06/2023    K 4.0 09/06/2023     09/06/2023    CO2 16 (L) 09/06/2023    BUN 10 09/06/2023    CREATININE 0.9 09/06/2023    CALCIUM 8.1 (L) 09/06/2023    ANIONGAP 15 09/06/2023    ESTGFRAFRICA >60 06/02/2022    EGFRNONAA >60 06/02/2022     Lab Results   Component Value Date    ALT 18 08/31/2023    AST 20 08/31/2023    ALKPHOS 59 08/31/2023    BILITOT 0.5 08/31/2023         Physical Exam  Vitals reviewed.   Constitutional:       Appearance: She is well-developed.   HENT:      Head: Normocephalic.      Right Ear: External ear normal.      Left Ear: External ear normal.   Eyes:      General: Lids are normal. No scleral icterus.        Right eye: No discharge.         Left eye: No discharge.      Conjunctiva/sclera: Conjunctivae normal.   Neck:      Thyroid: No thyroid mass.   Cardiovascular:      Rate and Rhythm: Normal rate and regular rhythm.      Heart sounds: Normal heart sounds.   Pulmonary:      Effort: Pulmonary effort is normal. No respiratory distress.      Breath sounds: Normal breath sounds. No wheezing or rales.   Abdominal:      General: There is no distension.   Genitourinary:     Comments: deferred  Musculoskeletal:         General: Normal range of motion.      Cervical back: Normal range of motion.   Skin:     General: Skin is warm and dry.   Neurological:      Mental Status: She is alert and oriented to person, place, and  time.   Psychiatric:         Speech: Speech normal.         Behavior: Behavior normal. Behavior is cooperative.         Thought Content: Thought content normal.        Assessment:     Problem List Items Addressed This Visit          Oncology    Iron deficiency anemia     Today's CBC show resolution of anemia with hg 12.0    Iron studies pending. Will follow up and replete PRN. We discussed the most likely cause of her acute anemia was secondary to her illness. Now recovering    Will arrange 1 month f/u with cbc, iron, ferritin, bmp. Sooner f/u PRN. Upcoming Colonoscopy per patient                Plan:     Other iron deficiency anemia          Med Onc Chart Routing      Follow up with physician    Follow up with GLENN 1 month.   Infusion scheduling note    Injection scheduling note    Labs CBC, ferritin, iron and TIBC and other   Scheduling:  Preferred lab:  Lab interval:  +BMP labs 1-2 days prior   Imaging None      Pharmacy appointment No pharmacy appointment needed      Other referrals     No additional referrals needed               Ember Veras, VIDHYAP-C

## 2023-09-13 ENCOUNTER — LAB VISIT (OUTPATIENT)
Dept: LAB | Facility: HOSPITAL | Age: 78
End: 2023-09-13
Payer: MEDICARE

## 2023-09-13 DIAGNOSIS — I10 ESSENTIAL HYPERTENSION, MALIGNANT: ICD-10-CM

## 2023-09-13 DIAGNOSIS — I50.32 CHRONIC DIASTOLIC HEART FAILURE: Primary | ICD-10-CM

## 2023-09-13 DIAGNOSIS — E11.29 TYPE II DIABETES MELLITUS WITH RENAL MANIFESTATIONS: ICD-10-CM

## 2023-09-13 LAB
ANION GAP SERPL CALC-SCNC: 11 MMOL/L (ref 8–16)
BASOPHILS # BLD AUTO: 0.03 K/UL (ref 0–0.2)
BASOPHILS NFR BLD: 0.6 % (ref 0–1.9)
BUN SERPL-MCNC: 8 MG/DL (ref 8–23)
CALCIUM SERPL-MCNC: 8.4 MG/DL (ref 8.7–10.5)
CHLORIDE SERPL-SCNC: 106 MMOL/L (ref 95–110)
CO2 SERPL-SCNC: 17 MMOL/L (ref 23–29)
CREAT SERPL-MCNC: 0.7 MG/DL (ref 0.5–1.4)
DIFFERENTIAL METHOD: ABNORMAL
EOSINOPHIL # BLD AUTO: 0.1 K/UL (ref 0–0.5)
EOSINOPHIL NFR BLD: 2.1 % (ref 0–8)
ERYTHROCYTE [DISTWIDTH] IN BLOOD BY AUTOMATED COUNT: 14.7 % (ref 11.5–14.5)
EST. GFR  (NO RACE VARIABLE): >60 ML/MIN/1.73 M^2
GLUCOSE SERPL-MCNC: 88 MG/DL (ref 70–110)
HCT VFR BLD AUTO: 28.2 % (ref 37–48.5)
HGB BLD-MCNC: 9 G/DL (ref 12–16)
IMM GRANULOCYTES # BLD AUTO: 0.03 K/UL (ref 0–0.04)
IMM GRANULOCYTES NFR BLD AUTO: 0.6 % (ref 0–0.5)
LYMPHOCYTES # BLD AUTO: 1.3 K/UL (ref 1–4.8)
LYMPHOCYTES NFR BLD: 25.3 % (ref 18–48)
MCH RBC QN AUTO: 29.2 PG (ref 27–31)
MCHC RBC AUTO-ENTMCNC: 31.9 G/DL (ref 32–36)
MCV RBC AUTO: 92 FL (ref 82–98)
MONOCYTES # BLD AUTO: 0.4 K/UL (ref 0.3–1)
MONOCYTES NFR BLD: 7.8 % (ref 4–15)
NEUTROPHILS # BLD AUTO: 3.4 K/UL (ref 1.8–7.7)
NEUTROPHILS NFR BLD: 63.6 % (ref 38–73)
NRBC BLD-RTO: 0 /100 WBC
PLATELET # BLD AUTO: 240 K/UL (ref 150–450)
PMV BLD AUTO: 9.6 FL (ref 9.2–12.9)
POTASSIUM SERPL-SCNC: 3.7 MMOL/L (ref 3.5–5.1)
RBC # BLD AUTO: 3.08 M/UL (ref 4–5.4)
SODIUM SERPL-SCNC: 134 MMOL/L (ref 136–145)
WBC # BLD AUTO: 5.26 K/UL (ref 3.9–12.7)

## 2023-09-13 PROCEDURE — 85025 COMPLETE CBC W/AUTO DIFF WBC: CPT | Performed by: PEDIATRICS

## 2023-09-13 PROCEDURE — 80048 BASIC METABOLIC PNL TOTAL CA: CPT | Performed by: PEDIATRICS

## 2023-09-14 ENCOUNTER — TELEPHONE (OUTPATIENT)
Dept: INTERNAL MEDICINE | Facility: CLINIC | Age: 78
End: 2023-09-14
Payer: MEDICARE

## 2023-09-14 DIAGNOSIS — M79.641 BILATERAL HAND PAIN: Primary | ICD-10-CM

## 2023-09-14 DIAGNOSIS — M79.642 BILATERAL HAND PAIN: Primary | ICD-10-CM

## 2023-09-14 NOTE — TELEPHONE ENCOUNTER
Spoke with pt brother Rm informing him that there is no closer appointment for 09/15/2023 he verbally understand and stated to leave them as is.    ----- Message from Susy Shaffer MA sent at 9/14/2023  2:14 PM CDT -----  Regarding: HFU Appt  Good afternoon,    Pt is scheduled w/ Cards and Ortho tomorrow at the [facility], but is also scheduled w/ IM at the [facility] in between both appts. Can you please contact pt/pt's brother to possibly reschedule to either the [facility] or another date?    Thank you,  Susy Shaffer MA

## 2023-09-15 ENCOUNTER — OFFICE VISIT (OUTPATIENT)
Dept: CARDIOLOGY | Facility: CLINIC | Age: 78
End: 2023-09-15
Payer: MEDICARE

## 2023-09-15 ENCOUNTER — HOSPITAL ENCOUNTER (OUTPATIENT)
Dept: RADIOLOGY | Facility: HOSPITAL | Age: 78
Discharge: HOME | End: 2023-09-15
Payer: MEDICARE

## 2023-09-15 ENCOUNTER — OFFICE VISIT (OUTPATIENT)
Dept: ORTHOPEDICS | Facility: CLINIC | Age: 78
End: 2023-09-15
Payer: MEDICARE

## 2023-09-15 ENCOUNTER — OFFICE VISIT (OUTPATIENT)
Dept: INTERNAL MEDICINE | Facility: CLINIC | Age: 78
End: 2023-09-15
Payer: MEDICARE

## 2023-09-15 VITALS
WEIGHT: 108 LBS | HEIGHT: 68 IN | TEMPERATURE: 96 F | HEART RATE: 92 BPM | OXYGEN SATURATION: 95 % | DIASTOLIC BLOOD PRESSURE: 60 MMHG | RESPIRATION RATE: 12 BRPM | SYSTOLIC BLOOD PRESSURE: 108 MMHG | BODY MASS INDEX: 16.37 KG/M2

## 2023-09-15 VITALS
OXYGEN SATURATION: 97 % | SYSTOLIC BLOOD PRESSURE: 110 MMHG | BODY MASS INDEX: 18.51 KG/M2 | DIASTOLIC BLOOD PRESSURE: 56 MMHG | HEIGHT: 64 IN | WEIGHT: 108.44 LBS | HEART RATE: 81 BPM

## 2023-09-15 VITALS — WEIGHT: 108 LBS | BODY MASS INDEX: 16.37 KG/M2 | HEIGHT: 68 IN

## 2023-09-15 DIAGNOSIS — N18.2 STAGE 2 CHRONIC KIDNEY DISEASE: ICD-10-CM

## 2023-09-15 DIAGNOSIS — M79.641 BILATERAL HAND PAIN: ICD-10-CM

## 2023-09-15 DIAGNOSIS — I50.32 CHRONIC DIASTOLIC HEART FAILURE: ICD-10-CM

## 2023-09-15 DIAGNOSIS — R53.83 FATIGUE, UNSPECIFIED TYPE: Primary | ICD-10-CM

## 2023-09-15 DIAGNOSIS — E78.2 MIXED HYPERLIPIDEMIA: ICD-10-CM

## 2023-09-15 DIAGNOSIS — M18.0 ARTHRITIS OF CARPOMETACARPAL (CMC) JOINT OF BOTH THUMBS: ICD-10-CM

## 2023-09-15 DIAGNOSIS — N18.30 CONTROLLED TYPE 2 DIABETES MELLITUS WITH STAGE 3 CHRONIC KIDNEY DISEASE, WITHOUT LONG-TERM CURRENT USE OF INSULIN: ICD-10-CM

## 2023-09-15 DIAGNOSIS — I10 PRIMARY HYPERTENSION: Primary | ICD-10-CM

## 2023-09-15 DIAGNOSIS — L03.011 CELLULITIS OF RIGHT THUMB: Primary | ICD-10-CM

## 2023-09-15 DIAGNOSIS — D50.8 OTHER IRON DEFICIENCY ANEMIA: ICD-10-CM

## 2023-09-15 DIAGNOSIS — E11.22 CONTROLLED TYPE 2 DIABETES MELLITUS WITH STAGE 3 CHRONIC KIDNEY DISEASE, WITHOUT LONG-TERM CURRENT USE OF INSULIN: ICD-10-CM

## 2023-09-15 DIAGNOSIS — D64.9 NORMOCYTIC ANEMIA: ICD-10-CM

## 2023-09-15 DIAGNOSIS — E87.1 HYPONATREMIA: ICD-10-CM

## 2023-09-15 DIAGNOSIS — I10 PRIMARY HYPERTENSION: ICD-10-CM

## 2023-09-15 DIAGNOSIS — I25.118 CORONARY ARTERY DISEASE OF NATIVE ARTERY OF NATIVE HEART WITH STABLE ANGINA PECTORIS: ICD-10-CM

## 2023-09-15 DIAGNOSIS — D64.9 ACUTE ANEMIA: ICD-10-CM

## 2023-09-15 DIAGNOSIS — E86.0 DEHYDRATION: ICD-10-CM

## 2023-09-15 DIAGNOSIS — I95.9 HYPOTENSION, UNSPECIFIED HYPOTENSION TYPE: ICD-10-CM

## 2023-09-15 DIAGNOSIS — M79.642 BILATERAL HAND PAIN: ICD-10-CM

## 2023-09-15 DIAGNOSIS — R45.84 ANHEDONIA: ICD-10-CM

## 2023-09-15 PROCEDURE — 99214 PR OFFICE/OUTPT VISIT, EST, LEVL IV, 30-39 MIN: ICD-10-PCS | Mod: S$GLB,,,

## 2023-09-15 PROCEDURE — 3288F PR FALLS RISK ASSESSMENT DOCUMENTED: ICD-10-PCS | Mod: CPTII,S$GLB,,

## 2023-09-15 PROCEDURE — 99999 PR PBB SHADOW E&M-EST. PATIENT-LVL V: CPT | Mod: PBBFAC,,, | Performed by: PHYSICIAN ASSISTANT

## 2023-09-15 PROCEDURE — 99214 OFFICE O/P EST MOD 30 MIN: CPT | Mod: S$GLB,,,

## 2023-09-15 PROCEDURE — 3078F PR MOST RECENT DIASTOLIC BLOOD PRESSURE < 80 MM HG: ICD-10-PCS | Mod: CPTII,S$GLB,, | Performed by: PHYSICIAN ASSISTANT

## 2023-09-15 PROCEDURE — 99214 PR OFFICE/OUTPT VISIT, EST, LEVL IV, 30-39 MIN: ICD-10-PCS | Mod: S$GLB,,, | Performed by: PHYSICIAN ASSISTANT

## 2023-09-15 PROCEDURE — 73130 X-RAY EXAM OF HAND: CPT | Mod: TC,50

## 2023-09-15 PROCEDURE — 1101F PT FALLS ASSESS-DOCD LE1/YR: CPT | Mod: CPTII,S$GLB,,

## 2023-09-15 PROCEDURE — 3074F PR MOST RECENT SYSTOLIC BLOOD PRESSURE < 130 MM HG: ICD-10-PCS | Mod: CPTII,S$GLB,, | Performed by: PHYSICIAN ASSISTANT

## 2023-09-15 PROCEDURE — 1100F PR PT FALLS ASSESS DOC 2+ FALLS/FALL W/INJURY/YR: ICD-10-PCS | Mod: CPTII,S$GLB,, | Performed by: PHYSICIAN ASSISTANT

## 2023-09-15 PROCEDURE — 99999 PR PBB SHADOW E&M-EST. PATIENT-LVL IV: ICD-10-PCS | Mod: PBBFAC,,,

## 2023-09-15 PROCEDURE — 1125F PR PAIN SEVERITY QUANTIFIED, PAIN PRESENT: ICD-10-PCS | Mod: CPTII,S$GLB,,

## 2023-09-15 PROCEDURE — 1125F AMNT PAIN NOTED PAIN PRSNT: CPT | Mod: CPTII,S$GLB,, | Performed by: PHYSICIAN ASSISTANT

## 2023-09-15 PROCEDURE — 99999 PR PBB SHADOW E&M-EST. PATIENT-LVL V: ICD-10-PCS | Mod: PBBFAC,,, | Performed by: PHYSICIAN ASSISTANT

## 2023-09-15 PROCEDURE — 3074F PR MOST RECENT SYSTOLIC BLOOD PRESSURE < 130 MM HG: ICD-10-PCS | Mod: CPTII,S$GLB,,

## 2023-09-15 PROCEDURE — 3288F PR FALLS RISK ASSESSMENT DOCUMENTED: ICD-10-PCS | Mod: CPTII,S$GLB,, | Performed by: PHYSICIAN ASSISTANT

## 2023-09-15 PROCEDURE — 1159F MED LIST DOCD IN RCRD: CPT | Mod: CPTII,S$GLB,, | Performed by: PHYSICIAN ASSISTANT

## 2023-09-15 PROCEDURE — 1160F RVW MEDS BY RX/DR IN RCRD: CPT | Mod: CPTII,S$GLB,, | Performed by: PHYSICIAN ASSISTANT

## 2023-09-15 PROCEDURE — 3074F SYST BP LT 130 MM HG: CPT | Mod: CPTII,S$GLB,, | Performed by: PHYSICIAN ASSISTANT

## 2023-09-15 PROCEDURE — 99999 PR PBB SHADOW E&M-EST. PATIENT-LVL V: ICD-10-PCS | Mod: PBBFAC,,,

## 2023-09-15 PROCEDURE — 3078F DIAST BP <80 MM HG: CPT | Mod: CPTII,S$GLB,,

## 2023-09-15 PROCEDURE — 3078F PR MOST RECENT DIASTOLIC BLOOD PRESSURE < 80 MM HG: ICD-10-PCS | Mod: CPTII,S$GLB,,

## 2023-09-15 PROCEDURE — 1101F PR PT FALLS ASSESS DOC 0-1 FALLS W/OUT INJ PAST YR: ICD-10-PCS | Mod: CPTII,S$GLB,,

## 2023-09-15 PROCEDURE — 3288F FALL RISK ASSESSMENT DOCD: CPT | Mod: CPTII,S$GLB,,

## 2023-09-15 PROCEDURE — 1125F PR PAIN SEVERITY QUANTIFIED, PAIN PRESENT: ICD-10-PCS | Mod: CPTII,S$GLB,, | Performed by: PHYSICIAN ASSISTANT

## 2023-09-15 PROCEDURE — 73130 XR HAND COMPLETE 3 VIEWS BILATERAL: ICD-10-PCS | Mod: 26,50,, | Performed by: RADIOLOGY

## 2023-09-15 PROCEDURE — 3074F SYST BP LT 130 MM HG: CPT | Mod: CPTII,S$GLB,,

## 2023-09-15 PROCEDURE — 1159F PR MEDICATION LIST DOCUMENTED IN MEDICAL RECORD: ICD-10-PCS | Mod: CPTII,S$GLB,, | Performed by: PHYSICIAN ASSISTANT

## 2023-09-15 PROCEDURE — 1160F PR REVIEW ALL MEDS BY PRESCRIBER/CLIN PHARMACIST DOCUMENTED: ICD-10-PCS | Mod: CPTII,S$GLB,, | Performed by: PHYSICIAN ASSISTANT

## 2023-09-15 PROCEDURE — 73130 X-RAY EXAM OF HAND: CPT | Mod: 26,50,, | Performed by: RADIOLOGY

## 2023-09-15 PROCEDURE — 99999 PR PBB SHADOW E&M-EST. PATIENT-LVL V: CPT | Mod: PBBFAC,,,

## 2023-09-15 PROCEDURE — 1125F AMNT PAIN NOTED PAIN PRSNT: CPT | Mod: CPTII,S$GLB,,

## 2023-09-15 PROCEDURE — 99999 PR PBB SHADOW E&M-EST. PATIENT-LVL IV: CPT | Mod: PBBFAC,,,

## 2023-09-15 PROCEDURE — 1100F PTFALLS ASSESS-DOCD GE2>/YR: CPT | Mod: CPTII,S$GLB,, | Performed by: PHYSICIAN ASSISTANT

## 2023-09-15 PROCEDURE — 3078F DIAST BP <80 MM HG: CPT | Mod: CPTII,S$GLB,, | Performed by: PHYSICIAN ASSISTANT

## 2023-09-15 PROCEDURE — 3288F FALL RISK ASSESSMENT DOCD: CPT | Mod: CPTII,S$GLB,, | Performed by: PHYSICIAN ASSISTANT

## 2023-09-15 PROCEDURE — 99214 OFFICE O/P EST MOD 30 MIN: CPT | Mod: S$GLB,,, | Performed by: PHYSICIAN ASSISTANT

## 2023-09-15 RX ORDER — HYDROCODONE BITARTRATE AND ACETAMINOPHEN 5; 325 MG/1; MG/1
1 TABLET ORAL EVERY 6 HOURS PRN
COMMUNITY

## 2023-09-15 RX ORDER — LINEZOLID 600 MG/1
600 TABLET, FILM COATED ORAL EVERY 12 HOURS
Qty: 14 TABLET | Refills: 0 | Status: SHIPPED | OUTPATIENT
Start: 2023-09-15 | End: 2024-03-26 | Stop reason: SDUPTHER

## 2023-09-15 NOTE — PROGRESS NOTES
Subjective:   Patient ID:  Shireen Felix is a 77 y.o. female who presents for evaluation of No chief complaint on file.      [id number] y.o. female pt with HFPEF, CAD PCI x1 done in [place], CVI, prior CVA, COPD, seizure disorder, DM type II, HTN, and ANDREI here for follow up CV eval. Last seen by Dr. Dye 5/17/23 for HTN and Imdur was increased, HCTZ 25mg PRN, lasix PRN, was also referred to Neuro, ENT, PMR, Nephro, Heme/Onc, pulm, pain management. Here today for follow up for BP. Pt reports feeling fatigue, weakness. Denies any CP, angina or anginal equivalent at this time.      Home BP logs 170s-200s/90-100s    Echo 6/2/23 nml heart function  Nuclear stress nml    6/30/23  Pt presents today for BP follow up. She was started in 25mg coreg 2 days ago. Carotid and renal stenosis ultrasound normal. Home BP readings 140s/80s after medication. Pt compliant with medications. She also reports her heart rate jumping and feeling palpitations in the afternoons. Reports occasional angina episodes    9/15/23  Here today for CV follow up, recent admission with HARESH and salmonella. Pt reports since DC she has felt fatigue and trouble with PO intake but states better than when hospitalized. ARB, amlodipine and HCTZ were stopped. Coreg was decreased to 6.25mg BID. BP today 100s/50s, seeing HH. Denies any CP at this time.     Past Medical History:   Diagnosis Date    Abnormal ECG 8/5/2019    Aneurysm     Anticoagulant long-term use     Arthritis     CAD in native artery 8/5/2019    COPD (chronic obstructive pulmonary disease)     Diabetes mellitus     Fall 10/10/2019    Formatting of this note might be different from the original. Found sitting on floor next to bed last night Mild confusion today Does not recall falling or how she ended up on floor UA today Labs yesterday unremarkable    Glaucoma     Hypertension     Seizures     Shingles 05/27/2017    Stroke        Past Surgical History:   Procedure Laterality Date    BRAIN  SURGERY      CARDIAC CATHETERIZATION      CORONARY ANGIOPLASTY      EPIDURAL STEROID INJECTION INTO LUMBAR SPINE Bilateral 11/12/2019    Procedure: TF XANDER L4/5;  Surgeon: Desean Dias MD;  Location: [facility];  Service: Pain Management;  Laterality: Bilateral;    HYSTERECTOMY      INJECTION OF ANESTHETIC AGENT AROUND MEDIAL BRANCH NERVES INNERVATING LUMBAR FACET JOINT Bilateral 1/31/2020    Procedure: Bilateral L3-5 MBB;  Surgeon: Desean Dias MD;  Location: [facility];  Service: Pain Management;  Laterality: Bilateral;    INJECTION OF ANESTHETIC AGENT INTO SACROILIAC JOINT Right 11/16/2021    Procedure: Right BLOCK, SACROILIAC JOINT Right GTB with RN IV sedation;  Surgeon: Yao Fulton MD;  Location: [facility];  Service: Pain Management;  Laterality: Right;    INJECTION OF ANESTHETIC AGENT INTO SACROILIAC JOINT Bilateral 7/28/2023    Procedure: Bilateral GT bursa + bilateral SIJ injection;  Surgeon: Francisco Oshea MD;  Location: [facility];  Service: Pain Management;  Laterality: Bilateral;    INJECTION OF JOINT Bilateral 7/9/2020    Procedure: Bilateral shoulder GH Joint injection with local;  Surgeon: Desean Dias MD;  Location: [facility];  Service: Pain Management;  Laterality: Bilateral;    INJECTION OF JOINT Bilateral 7/28/2023    Procedure: Bilateral GT bursa + bilateral SIJ injection NEEDS ADDITIONAL SEDATION AND MORE TIME BEFORE INJECTION RN IV Sedation;  Surgeon: Francisco Oshea MD;  Location: [facility];  Service: Pain Management;  Laterality: Bilateral;    SELECTIVE INJECTION OF ANESTHETIC AGENT AROUND LUMBAR SPINAL NERVE ROOT BY TRANSFORAMINAL APPROACH Bilateral 4/26/2023    Procedure: Bilateral L4/5 TF XANDER RN IV Sedation;  Surgeon: Francisco Oshea MD;  Location: [facility];  Service: Pain Management;  Laterality: Bilateral;    TRANSFORAMINAL EPIDURAL INJECTION OF STEROID Bilateral 7/23/2019    Procedure: Bilateral L3/4 Transforaminal Epidural Steroid Injection;   Surgeon: Desean Dias MD;  Location: [facility];  Service: Pain Management;  Laterality: Bilateral;    TRANSFORAMINAL EPIDURAL INJECTION OF STEROID Bilateral 3/10/2020    Procedure: Right T12/L1 TF XANDER with local;  Surgeon: Desean Dias MD;  Location: [facility];  Service: Pain Management;  Laterality: Bilateral;    TRANSFORAMINAL EPIDURAL INJECTION OF STEROID Right 2020    Procedure: Right T12/L1 TFESI Covid day of procedure;  Surgeon: Desean Dias MD;  Location: [facility];  Service: Pain Management;  Laterality: Right;       Social History     Tobacco Use    Smoking status: Former     Current packs/day: 0.00     Average packs/day: 1 pack/day for 42.3 years (42.3 ttl pk-yrs)     Types: Cigarettes     Start date:      Quit date: 2004     Years since quittin.4    Smokeless tobacco: Never   Substance Use Topics    Alcohol use: No    Drug use: Never       Family History   Problem Relation Age of Onset    No Known Problems Mother     No Known Problems Father        Current Outpatient Medications on File Prior to Visit   Medication Sig Dispense Refill    albuterol (PROVENTIL) 2.5 mg /3 mL (0.083 %) nebulizer solution Take 2.5 mg by nebulization every 6 (six) hours as needed for Wheezing. Rescue      albuterol (PROVENTIL/VENTOLIN HFA) 90 mcg/actuation inhaler INHALE TWO PUFFS INTO THE LUNGS EVERY 4 HOURS AS NEEDED 18 g 11    atorvastatin (LIPITOR) 40 MG tablet TAKE 1 TABLET BY MOUTH ONCE DAILY 90 tablet 1    carvediloL (COREG) 6.25 MG tablet Take 1 tablet (6.25 mg total) by mouth 2 (two) times daily with meals. 60 tablet 0    cholecalciferol, vitamin D3, 1,250 mcg (50,000 unit) capsule Take 1 capsule (50,000 Units total) by mouth every 7 days. 12 capsule 0    cholestyramine (QUESTRAN) 4 gram packet Take 1 packet (4 g total) by mouth 2 (two) times daily. 60 packet 2    clopidogreL (PLAVIX) 75 mg tablet Take 1 tablet (75 mg total) by mouth once daily. 90 tablet 3    DALIRESP 500 mcg  Tab TAKE 1 TABLET BY MOUTH ONCE DAILY 90 tablet 3    diltiaZEM (CARDIZEM CD) 120 MG Cp24 Take 2 capsules (240 mg total) by mouth once daily. 180 capsule 1    dorzolamide-timolol 2-0.5% (COSOPT) 22.3-6.8 mg/mL ophthalmic solution Place 1 drop into both eyes every 12 (twelve) hours. 10 mL 12    EScitalopram oxalate (LEXAPRO) 20 MG tablet TAKE 1 TABLET BY MOUTH ONCE DAILY 90 tablet 1    fluticasone propionate (FLONASE) 50 mcg/actuation nasal spray USE 2 SPRAYS INTO EACH NOSTRIL ONCE A DAY AT 6AM AS DIRECTED 16 g 11    fluticasone-umeclidin-vilanter (TRELEGY ELLIPTA) 200-62.5-25 mcg inhaler INHALE 1 PUFF BY MOUTH ONCE A DAY 60 each 11    HYDROcodone-acetaminophen (NORCO) 5-325 mg per tablet Take 1 tablet by mouth every 6 (six) hours as needed for Pain.      isosorbide mononitrate (IMDUR) 60 MG 24 hr tablet Take 1 tablet (60 mg total) by mouth every evening. 90 tablet 1    ketoconazole (NIZORAL) 2 % cream Apply topically 2 (two) times daily. For dry flaky patches of ear. 30 g 1    latanoprost 0.005 % ophthalmic solution Place 1 drop into both eyes every evening. 2.5 mL 12    levETIRAcetam (KEPPRA) 500 MG Tab Take 1 tablet (500 mg total) by mouth 2 (two) times daily. 180 tablet 3    magnesium oxide (MAG-OX) 400 mg (241.3 mg magnesium) tablet Take 1 tablet (400 mg total) by mouth once daily. 90 tablet 1    montelukast (SINGULAIR) 10 mg tablet TAKE 1 TABLET BY MOUTH ONCE A DAY 90 tablet 3    MYRBETRIQ 50 mg Tb24 TAKE 1 TABLET BY MOUTH ONCE DAILY 30 tablet 0    nitroGLYCERIN (NITROSTAT) 0.4 MG SL tablet DISSOLVE 1 TABLET UNDER TONGUE EVERY 5 MINUTES FOR CHEST PAIN (MAY TAKE UP TO 3 DOSES THEN SEEK MEDICAL ATTENTION) 25 tablet 0    pantoprazole (PROTONIX) 40 MG tablet Take 40 mg by mouth 2 (two) times daily.      potassium chloride SA (K-DUR,KLOR-CON) 20 MEQ tablet Take 1 tablet (20 mEq total) by mouth once daily. Take extra dose with Lasix - fluid pill 90 tablet 1    ranolazine (RANEXA) 1,000 mg Tb12 TAKE 1 TABLET BY MOUTH  TWICE DAILY 180 tablet 3    triamcinolone acetonide 0.025% (KENALOG) 0.025 % cream Apply topically 2 (two) times daily. PRN rash and itching of ear. Mild steroid cream. 30 g 0    APTIOM 400 mg Tab tablet Take 400 mg by mouth.      denosumab (PROLIA) 60 mg/mL Syrg every 6 (six) months.      dicyclomine (BENTYL) 10 MG capsule Take 1 capsule (10 mg total) by mouth 2 (two) times daily. 10 capsule 0    furosemide (LASIX) 20 MG tablet Take 20 mg by mouth daily as needed.      gabapentin (NEURONTIN) 300 MG capsule Take 1 capsule (300 mg total) by mouth 3 (three) times daily. 90 capsule 1    ipratropium (ATROVENT) 42 mcg (0.06 %) nasal spray USE 2 SPRAYS INTO EACH NOSTRIL FOUR TIMES DAILY 15 mL 11    nabumetone (RELAFEN) 500 MG tablet Take 1 tablet (500 mg total) by mouth 2 (two) times daily. 60 tablet 2    ondansetron (ZOFRAN-ODT) 4 MG TbDL Take 1 tablet (4 mg total) by mouth every 6 (six) hours as needed. 30 tablet 0    [DISCONTINUED] nortriptyline (PAMELOR) 25 MG capsule Take 1 capsule (25 mg total) by mouth every evening. 90 capsule 0     No current facility-administered medications on file prior to visit.      Wt Readings from Last 3 Encounters:   09/15/23 49.2 kg (108 lb 7.5 oz)   09/12/23 49 kg (108 lb 0.4 oz)   08/31/23 50 kg (110 lb 3.7 oz)     Temp Readings from Last 3 Encounters:   09/12/23 97.4 °F (36.3 °C) (Temporal)   08/20/23 97.9 °F (36.6 °C)   08/16/23 98.2 °F (36.8 °C) (Oral)     BP Readings from Last 3 Encounters:   09/15/23 (!) 110/56   09/12/23 115/60   08/31/23 120/62     Pulse Readings from Last 3 Encounters:   09/15/23 81   09/12/23 78   08/31/23 83        Review of Systems   Constitutional: Positive for malaise/fatigue.   HENT: Negative.     Eyes: Negative.    Cardiovascular: Negative.    Respiratory: Negative.     Skin: Negative.    Musculoskeletal: Negative.    Gastrointestinal: Negative.    Genitourinary: Negative.    Neurological:  Positive for weakness.   Psychiatric/Behavioral: Negative.          Objective:   Physical Exam  Vitals and nursing note reviewed.   Constitutional:       Appearance: Normal appearance.   HENT:      Head: Normocephalic.   Eyes:      Pupils: Pupils are equal, round, and reactive to light.   Cardiovascular:      Rate and Rhythm: Normal rate and regular rhythm.      Heart sounds: S1 normal and S2 normal. Murmur heard.      No S3 or S4 sounds.   Pulmonary:      Effort: Pulmonary effort is normal.      Breath sounds: Normal breath sounds.   Abdominal:      General: Bowel sounds are normal.      Palpations: Abdomen is soft.   Musculoskeletal:         General: Normal range of motion.      Cervical back: Normal range of motion.   Skin:     Capillary Refill: Capillary refill takes less than 2 seconds.   Neurological:      General: No focal deficit present.      Mental Status: She is alert and oriented to person, place, and time.      Motor: Weakness present.      Comments: Uses wheelchair occasionally    Psychiatric:         Mood and Affect: Mood normal.         Behavior: Behavior normal.         Thought Content: Thought content normal.         Lab Results   Component Value Date    CHOL 144 08/31/2023    CHOL 116 (L) 08/14/2023    CHOL 159 10/26/2022     Lab Results   Component Value Date    HDL 69 08/31/2023    HDL 46 08/14/2023    HDL 65 10/26/2022     Lab Results   Component Value Date    LDLCALC 62.4 (L) 08/31/2023    LDLCALC 49.4 (L) 08/14/2023    LDLCALC 79.8 10/26/2022     Lab Results   Component Value Date    TRIG 63 08/31/2023    TRIG 103 08/14/2023    TRIG 71 10/26/2022     Lab Results   Component Value Date    CHOLHDL 47.9 08/31/2023    CHOLHDL 39.7 08/14/2023    CHOLHDL 40.9 10/26/2022       Chemistry        Component Value Date/Time     (L) 09/13/2023 1118    K 3.7 09/13/2023 1118     09/13/2023 1118    CO2 17 (L) 09/13/2023 1118    BUN 8 09/13/2023 1118    CREATININE 0.7 09/13/2023 1118    GLU 88 09/13/2023 1118        Component Value Date/Time    CALCIUM 8.4  (L) 09/13/2023 1118    ALKPHOS 59 08/31/2023 1408    AST 20 08/31/2023 1408    ALT 18 08/31/2023 1408    BILITOT 0.5 08/31/2023 1408    ESTGFRAFRICA >60 06/02/2022 1549    EGFRNONAA >60 06/02/2022 1549          Lab Results   Component Value Date    TSH 1.249 08/09/2021     Lab Results   Component Value Date    INR 1.0 11/05/2020    INR 1.0 08/20/2020    INR 1.0 08/05/2020     @RESUFAST(WBC,HGB,HCT,MCV,PLT)  @LABRCNTIP(BNP,BNPTRIAGEBLO)@  Estimated Creatinine Clearance: 51.4 mL/min (based on SCr of 0.7 mg/dL).     Results for orders placed during the hospital encounter of 06/02/23    Echo    Interpretation Summary  · The left ventricle is normal in size with concentric hypertrophy and low normal systolic function.  · The estimated ejection fraction is 50%.  · Normal left ventricular diastolic function.  · There is abnormal septal wall motion consistent with left bundle branch block.  · Normal right ventricular size with normal right ventricular systolic function.  · Normal central venous pressure (3 mmHg).  · The estimated PA systolic pressure is 30 mmHg.  · Mild mitral regurgitation.     Results for orders placed during the hospital encounter of 06/02/23    Nuclear Stress - Cardiology Interpreted    Interpretation Summary    Normal myocardial perfusion scan. There is no evidence of myocardial ischemia or infarction.    The gated perfusion images showed an ejection fraction of 54% at rest. The gated perfusion images showed an ejection fraction of 62% post stress.    There is normal wall motion at rest and post stress.    LV cavity size is normal at rest and normal at stress.    The ECG portion of the study is negative for ischemia.    The patient reported no chest pain during the stress test.     Assessment:      1. Primary hypertension    2. Coronary artery disease of native artery of native heart with stable angina pectoris    3. Mixed hyperlipidemia    4. Hypotension, unspecified hypotension type    5. Chronic  diastolic heart failure            Plan:   Primary hypertension    Coronary artery disease of native artery of native heart with stable angina pectoris    Mixed hyperlipidemia    Hypotension, unspecified hypotension type    Chronic diastolic heart failure            Cont coreg, diltiazem, Imdur- HTN  Ranexa qd/ SL nitro PRN- chest pains  Statin, plavix- HLD/CAD/CVA  Cont BP log  RF modification, low fat diet, daily exercise as tolerated    Follows with Dr. Dye in clinic, RTC as scheduled    Courtney Guillot, FNP-C [facility], Cardiology

## 2023-09-15 NOTE — PROGRESS NOTES
SUBJECTIVE:      Chief Complaint: Pain of the Right Hand and Pain of the Left Hand    Referring Provider: Self, Aaareferral     History of Present Illness:  Patient is a 77 y.o. female who presents today with complaints of bilateral CMC thumb joint pain. Pain is 8/10, reports difficulty with gripping. She has noticed redness to the R thumb that has gotten worse since onset 2 days ago. Her last bilateral CMC injection was on 3/21/23 with good results until recently. She would like repeat injections today. Denies hx of gout. The patient denies any fevers, chills, N/V, D/C and presents for evaluation.    Interval hx 3/21/23 (Dr. 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  She also has significant cervical radiculopathy from C4-T1 bilaterally.  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 She states that she still has pain around the CMC joint of both thumbs. She was last injected 02/16/22 with good relief until recently and requests bilateral reinjection today. Patient also states that her right index MCP joint has been swollen and bothering her. She has thumb spica splints. Denies fever, chills, nausea, vomiting, CP, SOB.    Past Medical History:   Diagnosis Date    Abnormal ECG 8/5/2019    Aneurysm     Anticoagulant long-term use     Arthritis     CAD in native artery 8/5/2019    COPD (chronic obstructive pulmonary disease)     Diabetes mellitus     Fall 10/10/2019    Formatting of this note might be different from the original. Found sitting on floor next to bed last night Mild confusion today Does not recall falling or how she ended up on floor UA today Labs yesterday unremarkable    Glaucoma      Hypertension     Seizures     Shingles 05/27/2017    Stroke      Past Surgical History:   Procedure Laterality Date    BRAIN SURGERY      CARDIAC CATHETERIZATION      CORONARY ANGIOPLASTY      EPIDURAL STEROID INJECTION INTO LUMBAR SPINE Bilateral 11/12/2019    Procedure: TF XANDER L4/5;  Surgeon: Desean Dias MD;  Location: [facility];  Service: Pain Management;  Laterality: Bilateral;    HYSTERECTOMY      INJECTION OF ANESTHETIC AGENT AROUND MEDIAL BRANCH NERVES INNERVATING LUMBAR FACET JOINT Bilateral 1/31/2020    Procedure: Bilateral L3-5 MBB;  Surgeon: Desean Dias MD;  Location: [facility];  Service: Pain Management;  Laterality: Bilateral;    INJECTION OF ANESTHETIC AGENT INTO SACROILIAC JOINT Right 11/16/2021    Procedure: Right BLOCK, SACROILIAC JOINT Right GTB with RN IV sedation;  Surgeon: Yao Fulton MD;  Location: [facility];  Service: Pain Management;  Laterality: Right;    INJECTION OF ANESTHETIC AGENT INTO SACROILIAC JOINT Bilateral 7/28/2023    Procedure: Bilateral GT bursa + bilateral SIJ injection;  Surgeon: Francisco Oshea MD;  Location: [facility];  Service: Pain Management;  Laterality: Bilateral;    INJECTION OF JOINT Bilateral 7/9/2020    Procedure: Bilateral shoulder GH Joint injection with local;  Surgeon: Desean Dias MD;  Location: [facility];  Service: Pain Management;  Laterality: Bilateral;    INJECTION OF JOINT Bilateral 7/28/2023    Procedure: Bilateral GT bursa + bilateral SIJ injection NEEDS ADDITIONAL SEDATION AND MORE TIME BEFORE INJECTION RN IV Sedation;  Surgeon: Francisco Oshea MD;  Location: [facility];  Service: Pain Management;  Laterality: Bilateral;    SELECTIVE INJECTION OF ANESTHETIC AGENT AROUND LUMBAR SPINAL NERVE ROOT BY TRANSFORAMINAL APPROACH Bilateral 4/26/2023    Procedure: Bilateral L4/5 TF XANDER RN IV Sedation;  Surgeon: Francisco Oshea MD;  Location: [facility];  Service: Pain Management;  Laterality: Bilateral;    TRANSFORAMINAL  EPIDURAL INJECTION OF STEROID Bilateral 7/23/2019    Procedure: Bilateral L3/4 Transforaminal Epidural Steroid Injection;  Surgeon: Desean Dias MD;  Location: [facility];  Service: Pain Management;  Laterality: Bilateral;    TRANSFORAMINAL EPIDURAL INJECTION OF STEROID Bilateral 3/10/2020    Procedure: Right T12/L1 TF XANDER with local;  Surgeon: Desean Dias MD;  Location: [facility];  Service: Pain Management;  Laterality: Bilateral;    TRANSFORAMINAL EPIDURAL INJECTION OF STEROID Right 6/19/2020    Procedure: Right T12/L1 TFESI Covid day of procedure;  Surgeon: Desean Dias MD;  Location: [facility];  Service: Pain Management;  Laterality: Right;     Review of patient's allergies indicates:   Allergen Reactions    Penicillins Anaphylaxis, Hives, Shortness Of Breath and Swelling    Adhesive Rash    Doxycycline Nausea Only    Oxycodone Other (See Comments)     Fatigue      Sulfa (sulfonamide antibiotics) Itching     Social History     Social History Narrative    No pets or smokers in household.     Family History   Problem Relation Age of Onset    No Known Problems Mother     No Known Problems Father          Current Outpatient Medications:     albuterol (PROVENTIL) 2.5 mg /3 mL (0.083 %) nebulizer solution, Take 2.5 mg by nebulization every 6 (six) hours as needed for Wheezing. Rescue, Disp: , Rfl:     albuterol (PROVENTIL/VENTOLIN HFA) 90 mcg/actuation inhaler, INHALE TWO PUFFS INTO THE LUNGS EVERY 4 HOURS AS NEEDED, Disp: 18 g, Rfl: 11    APTIOM 400 mg Tab tablet, Take 400 mg by mouth., Disp: , Rfl:     atorvastatin (LIPITOR) 40 MG tablet, TAKE 1 TABLET BY MOUTH ONCE DAILY, Disp: 90 tablet, Rfl: 1    carvediloL (COREG) 6.25 MG tablet, Take 1 tablet (6.25 mg total) by mouth 2 (two) times daily with meals., Disp: 60 tablet, Rfl: 0    cholecalciferol, vitamin D3, 1,250 mcg (50,000 unit) capsule, Take 1 capsule (50,000 Units total) by mouth every 7 days., Disp: 12 capsule, Rfl: 0    cholestyramine  (QUESTRAN) 4 gram packet, Take 1 packet (4 g total) by mouth 2 (two) times daily., Disp: 60 packet, Rfl: 2    clopidogreL (PLAVIX) 75 mg tablet, Take 1 tablet (75 mg total) by mouth once daily., Disp: 90 tablet, Rfl: 3    DALIRESP 500 mcg Tab, TAKE 1 TABLET BY MOUTH ONCE DAILY, Disp: 90 tablet, Rfl: 3    denosumab (PROLIA) 60 mg/mL Syrg, every 6 (six) months., Disp: , Rfl:     dicyclomine (BENTYL) 10 MG capsule, Take 1 capsule (10 mg total) by mouth 2 (two) times daily., Disp: 10 capsule, Rfl: 0    diltiaZEM (CARDIZEM CD) 120 MG Cp24, Take 2 capsules (240 mg total) by mouth once daily., Disp: 180 capsule, Rfl: 1    dorzolamide-timolol 2-0.5% (COSOPT) 22.3-6.8 mg/mL ophthalmic solution, Place 1 drop into both eyes every 12 (twelve) hours., Disp: 10 mL, Rfl: 12    EScitalopram oxalate (LEXAPRO) 20 MG tablet, TAKE 1 TABLET BY MOUTH ONCE DAILY, Disp: 90 tablet, Rfl: 1    fluticasone propionate (FLONASE) 50 mcg/actuation nasal spray, USE 2 SPRAYS INTO EACH NOSTRIL ONCE A DAY AT 6AM AS DIRECTED, Disp: 16 g, Rfl: 11    fluticasone-umeclidin-vilanter (TRELEGY ELLIPTA) 200-62.5-25 mcg inhaler, INHALE 1 PUFF BY MOUTH ONCE A DAY, Disp: 60 each, Rfl: 11    furosemide (LASIX) 20 MG tablet, Take 20 mg by mouth daily as needed., Disp: , Rfl:     gabapentin (NEURONTIN) 300 MG capsule, Take 1 capsule (300 mg total) by mouth 3 (three) times daily., Disp: 90 capsule, Rfl: 1    HYDROcodone-acetaminophen (NORCO) 5-325 mg per tablet, Take 1 tablet by mouth every 6 (six) hours as needed for Pain., Disp: , Rfl:     ipratropium (ATROVENT) 42 mcg (0.06 %) nasal spray, USE 2 SPRAYS INTO EACH NOSTRIL FOUR TIMES DAILY, Disp: 15 mL, Rfl: 11    isosorbide mononitrate (IMDUR) 60 MG 24 hr tablet, Take 1 tablet (60 mg total) by mouth every evening., Disp: 90 tablet, Rfl: 1    ketoconazole (NIZORAL) 2 % cream, Apply topically 2 (two) times daily. For dry flaky patches of ear., Disp: 30 g, Rfl: 1    latanoprost 0.005 % ophthalmic solution, Place 1  drop into both eyes every evening., Disp: 2.5 mL, Rfl: 12    levETIRAcetam (KEPPRA) 500 MG Tab, Take 1 tablet (500 mg total) by mouth 2 (two) times daily., Disp: 180 tablet, Rfl: 3    magnesium oxide (MAG-OX) 400 mg (241.3 mg magnesium) tablet, Take 1 tablet (400 mg total) by mouth once daily., Disp: 90 tablet, Rfl: 1    montelukast (SINGULAIR) 10 mg tablet, TAKE 1 TABLET BY MOUTH ONCE A DAY, Disp: 90 tablet, Rfl: 3    MYRBETRIQ 50 mg Tb24, TAKE 1 TABLET BY MOUTH ONCE DAILY, Disp: 30 tablet, Rfl: 0    nabumetone (RELAFEN) 500 MG tablet, Take 1 tablet (500 mg total) by mouth 2 (two) times daily., Disp: 60 tablet, Rfl: 2    nitroGLYCERIN (NITROSTAT) 0.4 MG SL tablet, DISSOLVE 1 TABLET UNDER TONGUE EVERY 5 MINUTES FOR CHEST PAIN (MAY TAKE UP TO 3 DOSES THEN SEEK MEDICAL ATTENTION), Disp: 25 tablet, Rfl: 0    ondansetron (ZOFRAN-ODT) 4 MG TbDL, Take 1 tablet (4 mg total) by mouth every 6 (six) hours as needed., Disp: 30 tablet, Rfl: 0    pantoprazole (PROTONIX) 40 MG tablet, Take 40 mg by mouth 2 (two) times daily., Disp: , Rfl:     potassium chloride SA (K-DUR,KLOR-CON) 20 MEQ tablet, Take 1 tablet (20 mEq total) by mouth once daily. Take extra dose with Lasix - fluid pill, Disp: 90 tablet, Rfl: 1    ranolazine (RANEXA) 1,000 mg Tb12, TAKE 1 TABLET BY MOUTH TWICE DAILY, Disp: 180 tablet, Rfl: 3    triamcinolone acetonide 0.025% (KENALOG) 0.025 % cream, Apply topically 2 (two) times daily. PRN rash and itching of ear. Mild steroid cream., Disp: 30 g, Rfl: 0    linezolid (ZYVOX) 600 mg Tab, Take 1 tablet (600 mg total) by mouth every 12 (twelve) hours., Disp: 14 tablet, Rfl: 0      Review of Systems:  Constitutional: Negative for chills and fever.   Respiratory: Negative for cough and shortness of breath.    Gastrointestinal: Negative for nausea and vomiting.   Skin: Negative for rash.   Neurological: Negative for dizziness and headaches.   Psychiatric/Behavioral: Negative for depression.   MSK as in HPI     OBJECTIVE:   "    Vital Signs (Most Recent):  Vitals:    09/15/23 0952   Weight: 49 kg (108 lb)   Height: 5' 8" (1.727 m)     Body mass index is 16.42 kg/m².      Physical Exam:  Constitutional: The patient appears well-developed and well-nourished. No distress.   Skin: No lesions appreciated  Head: Normocephalic and atraumatic.   Nose: Nose normal.   Ears: No deformities seen  Eyes: Conjunctivae and EOM are normal.   Neck: No tracheal deviation present.   Cardiovascular: Normal rate and intact distal pulses.    Pulmonary/Chest: Effort normal. No respiratory distress.   Abdominal: There is no guarding.   Neurological: The patient is alert.   Psychiatric: The patient has a normal mood and affect.     General    Vitals reviewed.            Right Hand/Wrist Exam     Inspection   Scars: Wrist - absent Hand -  absent  Effusion: Wrist - absent     Pain   Wrist - The patient exhibits pain of the CMC.    Swelling   Wrist - The patient is swollen on the CMC.    Comments:  Mild edema, erythema, warm to touch to thumb CMC. Tender to touch. See pictures below  No lacerations or abrasions  No motor or sensory deficits      Left Hand/Wrist Exam     Inspection   Scars: Wrist - absent Hand -  absent  Effusion: Wrist - absent Hand -  absent  Deformity: Wrist - present (arthritic changes)     Pain   Wrist - The patient exhibits pain of the CMC.    Comments:  Thumb CMC tender to palpation  + axial grind test  No motor or sensory deficits  No signs of infection                  Diagnostic Results:  EXAMINATION:  XR HAND COMPLETE 3 VIEWS BILATERAL     CLINICAL HISTORY:  . Pain in right hand     TECHNIQUE:  PA, lateral, and oblique views of both hands were performed.     COMPARISON:  08/06/2020     FINDINGS:  No acute osseous abnormality with similar OA findings present most pronounced within the left greater than right 1st CMC joints.  Soft tissues unremarkable.     Impression:     Similar appearance of the hands/wrists        Electronically signed " by: Davy Gutierrez MD  Date:                                            09/15/2023  Time:                                           09:36     Imaging - I independently viewed the patient's imaging as well as the radiology report.  Xrays of the patient's bilateral hands demonstrate bilateral 1st cmc arthritis, no evidence of any acute fractures or dislocations.      ASSESSMENT/PLAN:        ICD-10-CM ICD-9-CM   1. Cellulitis of right thumb  L03.011 681.00   2. Arthritis of carpometacarpal (CMC) joint of both thumbs  M18.0 716.94     Orders Placed This Encounter    linezolid (ZYVOX) 600 mg Tab     No orders of the defined types were placed in this encounter.    Plan:     - discussed with pt suspicions of R thumb cellulitis vs gout flare -- likely cellulitis as the erythema has worsened since onset 2 days ago  - start Linezolid 600mg q12h x 7 days (pt allergic to PCN, doxy, sulfa, clindamycin causes GI upset)  - ice, rest, elevation, brace to hand  - if sx resolve by next visit, plan for bilateral 1st cmc injections  - f/u in 1 week     Should the patient's symptoms worsen, persist, or fail to improve they should return for reevaluation and I would be happy to see them back anytime.        Jennifer Atkins PA-C   [facility]     Please be aware that this note has been generated with the assistance of [email or web address] voice-to-text.  Please excuse any spelling or grammatical errors.

## 2023-09-15 NOTE — PROGRESS NOTES
Subjective:      Patient ID: Shireen Felix is a 77 y.o. female.    Chief Complaint: Follow-up    Fatigue  This is a new problem. The current episode started 1 to 4 weeks ago. Associated symptoms include fatigue and weakness. Pertinent negatives include no abdominal pain, anorexia, arthralgias, change in bowel habit, chest pain, chills, congestion, coughing, diaphoresis, fever, headaches, joint swelling, myalgias, nausea, neck pain, numbness, rash, sore throat, swollen glands, urinary symptoms, vertigo, visual change or vomiting.     Here today for a hospital follow up for diarrhea and dehydration.   OLOL hospitalized for 5 days. Diagnosed with salmonella. Treated with antibiotics.   Needs to have colonoscopy but just feels  so weak.   Diarrhea improving.   Saw cardiologist today.   On iron supplement.   Seeing hematology/oncology and GI.   Colonoscopy scheduling apt scheduled for Monday.  Very fatigued and weak. Doesn't feel like doing anything.   BP stable.     Patient Active Problem List   Diagnosis    Nasal septal perforation    Postherpetic neuralgia    Conductive hearing loss, unilateral    Chronic otitis media    Chronic allergic rhinitis    Hx of completed stroke    Elevated IgE level    GERD (gastroesophageal reflux disease)    History of cerebral aneurysm repair    History of tobacco abuse    HTN (hypertension)    Iron deficiency anemia    Spinal stenosis of lumbar region with neurogenic claudication    Mucopurulent chronic bronchitis    Pulmonary interstitial fibrosis    Recurrent falls    DM (diabetes mellitus) type II controlled with renal manifestation    Coronary artery disease of native artery of native heart with stable angina pectoris    Neuroforaminal stenosis of lumbar spine    Lumbar radiculopathy    History of stroke    Seizure    Normocytic anemia    Hyponatremia    Debility    Osteoarthritis of left wrist    Chronic back pain    Chronic pain syndrome    Decreased GFR    Age-related  osteoporosis without current pathological fracture    Shoulder arthritis    Spontaneous ecchymosis    Bilateral carpal tunnel syndrome    Cervical radiculopathy    Cubital tunnel syndrome on right    Depression    Hyperglycemia due to type 2 diabetes mellitus    Imbalance    Long term current use of insulin    Mild protein-calorie malnutrition    Mixed hyperlipidemia    Moderate recurrent major depression    Hypotension    Other constipation    Overactive bladder    Perforation of right tympanic membrane    Primary insomnia    Vitamin D deficiency    Polypharmacy    Multiple neurological symptoms    DDD (degenerative disc disease), thoracolumbar    DDD (degenerative disc disease), thoracic    DDD (degenerative disc disease), lumbosacral    DDD (degenerative disc disease), lumbar    DDD (degenerative disc disease), cervical    History of cerebral aneurysm    Hx of craniotomy    Temporal lobe epilepsy    Syncope    Spinal stenosis    Fluctuating blood pressure    Sacroiliitis, not elsewhere classified    Chronic diastolic heart failure    Moderate persistent asthma without complication    Embolism and thrombosis of unspecified artery    Lumbar radiculopathy, chronic    Multiple pulmonary nodules    Bilateral renal cysts    Fatigue    Primary snoring    Vertigo    CKD (chronic kidney disease)    Hypomagnesemia    Sacroiliac joint pain    Diarrhea    Vaginal atrophy    Acute anemia    Dehydration         Current Outpatient Medications:     albuterol (PROVENTIL) 2.5 mg /3 mL (0.083 %) nebulizer solution, Take 2.5 mg by nebulization every 6 (six) hours as needed for Wheezing. Rescue, Disp: , Rfl:     albuterol (PROVENTIL/VENTOLIN HFA) 90 mcg/actuation inhaler, INHALE TWO PUFFS INTO THE LUNGS EVERY 4 HOURS AS NEEDED, Disp: 18 g, Rfl: 11    APTIOM 400 mg Tab tablet, Take 400 mg by mouth., Disp: , Rfl:     atorvastatin (LIPITOR) 40 MG tablet, TAKE 1 TABLET BY MOUTH ONCE DAILY, Disp: 90 tablet, Rfl: 1    carvediloL (COREG)  6.25 MG tablet, Take 1 tablet (6.25 mg total) by mouth 2 (two) times daily with meals., Disp: 60 tablet, Rfl: 0    cholecalciferol, vitamin D3, 1,250 mcg (50,000 unit) capsule, Take 1 capsule (50,000 Units total) by mouth every 7 days., Disp: 12 capsule, Rfl: 0    cholestyramine (QUESTRAN) 4 gram packet, Take 1 packet (4 g total) by mouth 2 (two) times daily., Disp: 60 packet, Rfl: 2    clopidogreL (PLAVIX) 75 mg tablet, Take 1 tablet (75 mg total) by mouth once daily., Disp: 90 tablet, Rfl: 3    DALIRESP 500 mcg Tab, TAKE 1 TABLET BY MOUTH ONCE DAILY, Disp: 90 tablet, Rfl: 3    denosumab (PROLIA) 60 mg/mL Syrg, every 6 (six) months., Disp: , Rfl:     dicyclomine (BENTYL) 10 MG capsule, Take 1 capsule (10 mg total) by mouth 2 (two) times daily., Disp: 10 capsule, Rfl: 0    diltiaZEM (CARDIZEM CD) 120 MG Cp24, Take 2 capsules (240 mg total) by mouth once daily., Disp: 180 capsule, Rfl: 1    dorzolamide-timolol 2-0.5% (COSOPT) 22.3-6.8 mg/mL ophthalmic solution, Place 1 drop into both eyes every 12 (twelve) hours., Disp: 10 mL, Rfl: 12    EScitalopram oxalate (LEXAPRO) 20 MG tablet, TAKE 1 TABLET BY MOUTH ONCE DAILY, Disp: 90 tablet, Rfl: 1    fluticasone propionate (FLONASE) 50 mcg/actuation nasal spray, USE 2 SPRAYS INTO EACH NOSTRIL ONCE A DAY AT 6AM AS DIRECTED, Disp: 16 g, Rfl: 11    fluticasone-umeclidin-vilanter (TRELEGY ELLIPTA) 200-62.5-25 mcg inhaler, INHALE 1 PUFF BY MOUTH ONCE A DAY, Disp: 60 each, Rfl: 11    furosemide (LASIX) 20 MG tablet, Take 20 mg by mouth daily as needed., Disp: , Rfl:     gabapentin (NEURONTIN) 300 MG capsule, Take 1 capsule (300 mg total) by mouth 3 (three) times daily., Disp: 90 capsule, Rfl: 1    HYDROcodone-acetaminophen (NORCO) 5-325 mg per tablet, Take 1 tablet by mouth every 6 (six) hours as needed for Pain., Disp: , Rfl:     ipratropium (ATROVENT) 42 mcg (0.06 %) nasal spray, USE 2 SPRAYS INTO EACH NOSTRIL FOUR TIMES DAILY, Disp: 15 mL, Rfl: 11    isosorbide mononitrate  (IMDUR) 60 MG 24 hr tablet, Take 1 tablet (60 mg total) by mouth every evening., Disp: 90 tablet, Rfl: 1    ketoconazole (NIZORAL) 2 % cream, Apply topically 2 (two) times daily. For dry flaky patches of ear., Disp: 30 g, Rfl: 1    latanoprost 0.005 % ophthalmic solution, Place 1 drop into both eyes every evening., Disp: 2.5 mL, Rfl: 12    levETIRAcetam (KEPPRA) 500 MG Tab, Take 1 tablet (500 mg total) by mouth 2 (two) times daily., Disp: 180 tablet, Rfl: 3    linezolid (ZYVOX) 600 mg Tab, Take 1 tablet (600 mg total) by mouth every 12 (twelve) hours., Disp: 14 tablet, Rfl: 0    magnesium oxide (MAG-OX) 400 mg (241.3 mg magnesium) tablet, Take 1 tablet (400 mg total) by mouth once daily., Disp: 90 tablet, Rfl: 1    montelukast (SINGULAIR) 10 mg tablet, TAKE 1 TABLET BY MOUTH ONCE A DAY, Disp: 90 tablet, Rfl: 3    MYRBETRIQ 50 mg Tb24, TAKE 1 TABLET BY MOUTH ONCE DAILY, Disp: 30 tablet, Rfl: 0    nabumetone (RELAFEN) 500 MG tablet, Take 1 tablet (500 mg total) by mouth 2 (two) times daily., Disp: 60 tablet, Rfl: 2    nitroGLYCERIN (NITROSTAT) 0.4 MG SL tablet, DISSOLVE 1 TABLET UNDER TONGUE EVERY 5 MINUTES FOR CHEST PAIN (MAY TAKE UP TO 3 DOSES THEN SEEK MEDICAL ATTENTION), Disp: 25 tablet, Rfl: 0    ondansetron (ZOFRAN-ODT) 4 MG TbDL, Take 1 tablet (4 mg total) by mouth every 6 (six) hours as needed., Disp: 30 tablet, Rfl: 0    pantoprazole (PROTONIX) 40 MG tablet, Take 40 mg by mouth 2 (two) times daily., Disp: , Rfl:     potassium chloride SA (K-DUR,KLOR-CON) 20 MEQ tablet, Take 1 tablet (20 mEq total) by mouth once daily. Take extra dose with Lasix - fluid pill, Disp: 90 tablet, Rfl: 1    ranolazine (RANEXA) 1,000 mg Tb12, TAKE 1 TABLET BY MOUTH TWICE DAILY, Disp: 180 tablet, Rfl: 3    triamcinolone acetonide 0.025% (KENALOG) 0.025 % cream, Apply topically 2 (two) times daily. PRN rash and itching of ear. Mild steroid cream., Disp: 30 g, Rfl: 0    Review of Systems   Constitutional:  Positive for fatigue.  "Negative for activity change, appetite change, chills, diaphoresis, fever and unexpected weight change.   HENT: Negative.  Negative for congestion, hearing loss, postnasal drip, rhinorrhea, sore throat, trouble swallowing and voice change.    Eyes: Negative.  Negative for visual disturbance.   Respiratory: Negative.  Negative for cough, choking, chest tightness and shortness of breath.    Cardiovascular:  Negative for chest pain, palpitations and leg swelling.   Gastrointestinal:  Negative for abdominal distention, abdominal pain, anorexia, blood in stool, change in bowel habit, constipation, diarrhea, nausea and vomiting.   Endocrine: Negative for cold intolerance, heat intolerance, polydipsia and polyuria.   Genitourinary: Negative.  Negative for difficulty urinating and frequency.   Musculoskeletal:  Negative for arthralgias, back pain, gait problem, joint swelling, myalgias and neck pain.   Skin:  Negative for color change, pallor, rash and wound.   Neurological:  Positive for weakness. Negative for dizziness, vertigo, tremors, light-headedness, numbness and headaches.   Hematological:  Negative for adenopathy.   Psychiatric/Behavioral:  Negative for behavioral problems, confusion, self-injury, sleep disturbance and suicidal ideas. The patient is not nervous/anxious.      Objective:   /60 (BP Location: Left arm, Patient Position: Sitting, BP Method: Medium (Manual))   Pulse 92   Temp 96.2 °F (35.7 °C) (Tympanic)   Resp 12   Ht 5' 8" (1.727 m)   Wt 49 kg (108 lb)   LMP  (LMP Unknown)   SpO2 95%   BMI 16.42 kg/m²     Physical Exam  Vitals and nursing note reviewed.   Constitutional:       General: She is not in acute distress.     Appearance: Normal appearance. She is well-developed. She is not ill-appearing, toxic-appearing or diaphoretic.   HENT:      Head: Normocephalic and atraumatic.   Cardiovascular:      Rate and Rhythm: Normal rate and regular rhythm.      Heart sounds: Normal heart sounds. " No murmur heard.     No friction rub. No gallop.   Pulmonary:      Effort: Pulmonary effort is normal. No respiratory distress.      Breath sounds: Normal breath sounds. No wheezing or rales.   Musculoskeletal:         General: Normal range of motion.   Skin:     General: Skin is warm.      Capillary Refill: Capillary refill takes less than 2 seconds.      Findings: No rash.   Neurological:      Mental Status: She is alert and oriented to person, place, and time.      Motor: No weakness.      Gait: Gait normal.   Psychiatric:         Mood and Affect: Mood normal.         Behavior: Behavior normal.         Thought Content: Thought content normal.         Judgment: Judgment normal.         Assessment:     1. Fatigue, unspecified type    2. Dehydration    3. Other iron deficiency anemia    4. Normocytic anemia    5. Acute anemia    6. Stage 2 chronic kidney disease    7. Primary hypertension    8. Controlled type 2 diabetes mellitus with stage 3 chronic kidney disease, without long-term current use of insulin    9. Hyponatremia    10. Anhedonia      Plan:   Fatigue, unspecified type  -     TSH; Future    Dehydration    Other iron deficiency anemia    Normocytic anemia    Acute anemia    Stage 2 chronic kidney disease    Primary hypertension    Controlled type 2 diabetes mellitus with stage 3 chronic kidney disease, without long-term current use of insulin    Hyponatremia    Anhedonia  -     Vitamin B12; Future; Expected date: 09/15/2023      -cont close follow up with specialist  -follow up with pcp in 1 month    Follow up in about 4 weeks (around 10/13/2023), or if symptoms worsen or fail to improve.

## 2023-09-18 ENCOUNTER — HOSPITAL ENCOUNTER (OUTPATIENT)
Dept: PREADMISSION TESTING | Facility: HOSPITAL | Age: 78
Discharge: HOME | End: 2023-09-18
Attending: INTERNAL MEDICINE
Payer: MEDICARE

## 2023-09-21 ENCOUNTER — HOSPITAL ENCOUNTER (OUTPATIENT)
Facility: HOSPITAL | Age: 78
Discharge: HOME | End: 2023-09-21
Attending: INTERNAL MEDICINE | Admitting: INTERNAL MEDICINE
Payer: MEDICARE

## 2023-09-21 ENCOUNTER — ANESTHESIA EVENT (OUTPATIENT)
Dept: ENDOSCOPY | Facility: HOSPITAL | Age: 78
End: 2023-09-21
Payer: MEDICARE

## 2023-09-21 ENCOUNTER — ANESTHESIA (OUTPATIENT)
Dept: ENDOSCOPY | Facility: HOSPITAL | Age: 78
End: 2023-09-21
Payer: MEDICARE

## 2023-09-21 DIAGNOSIS — K52.9 CHRONIC DIARRHEA: Primary | ICD-10-CM

## 2023-09-21 DIAGNOSIS — R63.4 WEIGHT LOSS: ICD-10-CM

## 2023-09-21 LAB — POCT GLUCOSE: 85 MG/DL (ref 70–110)

## 2023-09-21 PROCEDURE — 45380 COLONOSCOPY AND BIOPSY: CPT | Mod: 59,,, | Performed by: INTERNAL MEDICINE

## 2023-09-21 PROCEDURE — 45380 COLONOSCOPY AND BIOPSY: CPT | Mod: 59 | Performed by: INTERNAL MEDICINE

## 2023-09-21 PROCEDURE — 88305 TISSUE EXAM BY PATHOLOGIST: CPT | Mod: 26,,, | Performed by: PATHOLOGY

## 2023-09-21 PROCEDURE — 37000009 HC ANESTHESIA EA ADD 15 MINS: Performed by: INTERNAL MEDICINE

## 2023-09-21 PROCEDURE — 63600175 PHARM REV CODE 636 W HCPCS: Performed by: NURSE ANESTHETIST, CERTIFIED REGISTERED

## 2023-09-21 PROCEDURE — 25000003 PHARM REV CODE 250: Performed by: NURSE ANESTHETIST, CERTIFIED REGISTERED

## 2023-09-21 PROCEDURE — 27201089 HC SNARE, DISP (ANY): Performed by: INTERNAL MEDICINE

## 2023-09-21 PROCEDURE — 37000008 HC ANESTHESIA 1ST 15 MINUTES: Performed by: INTERNAL MEDICINE

## 2023-09-21 PROCEDURE — 45385 PR COLONOSCOPY,REMV LESN,SNARE: ICD-10-PCS | Mod: ,,, | Performed by: INTERNAL MEDICINE

## 2023-09-21 PROCEDURE — 45385 COLONOSCOPY W/LESION REMOVAL: CPT | Performed by: INTERNAL MEDICINE

## 2023-09-21 PROCEDURE — 88305 TISSUE EXAM BY PATHOLOGIST: ICD-10-PCS | Mod: 26,,, | Performed by: PATHOLOGY

## 2023-09-21 PROCEDURE — 88305 TISSUE EXAM BY PATHOLOGIST: CPT | Mod: 59 | Performed by: PATHOLOGY

## 2023-09-21 PROCEDURE — 45380 PR COLONOSCOPY,BIOPSY: ICD-10-PCS | Mod: 59,,, | Performed by: INTERNAL MEDICINE

## 2023-09-21 PROCEDURE — 45385 COLONOSCOPY W/LESION REMOVAL: CPT | Mod: ,,, | Performed by: INTERNAL MEDICINE

## 2023-09-21 PROCEDURE — 27201012 HC FORCEPS, HOT/COLD, DISP: Performed by: INTERNAL MEDICINE

## 2023-09-21 PROCEDURE — 25000003 PHARM REV CODE 250: Performed by: INTERNAL MEDICINE

## 2023-09-21 RX ORDER — PROPOFOL 10 MG/ML
VIAL (ML) INTRAVENOUS
Status: DISCONTINUED | OUTPATIENT
Start: 2023-09-21 | End: 2023-09-21

## 2023-09-21 RX ORDER — SODIUM CHLORIDE, SODIUM LACTATE, POTASSIUM CHLORIDE, CALCIUM CHLORIDE 600; 310; 30; 20 MG/100ML; MG/100ML; MG/100ML; MG/100ML
INJECTION, SOLUTION INTRAVENOUS CONTINUOUS PRN
Status: DISCONTINUED | OUTPATIENT
Start: 2023-09-21 | End: 2023-09-21

## 2023-09-21 RX ORDER — LIDOCAINE HYDROCHLORIDE 10 MG/ML
INJECTION, SOLUTION EPIDURAL; INFILTRATION; INTRACAUDAL; PERINEURAL
Status: DISCONTINUED | OUTPATIENT
Start: 2023-09-21 | End: 2023-09-21

## 2023-09-21 RX ORDER — DEXTROMETHORPHAN/PSEUDOEPHED 2.5-7.5/.8
DROPS ORAL
Status: COMPLETED | OUTPATIENT
Start: 2023-09-21 | End: 2023-09-21

## 2023-09-21 RX ADMIN — PROPOFOL 30 MG: 10 INJECTION, EMULSION INTRAVENOUS at 01:09

## 2023-09-21 RX ADMIN — LIDOCAINE HYDROCHLORIDE 50 MG: 10 SOLUTION INTRAVENOUS at 01:09

## 2023-09-21 RX ADMIN — PROPOFOL 40 MG: 10 INJECTION, EMULSION INTRAVENOUS at 01:09

## 2023-09-21 RX ADMIN — SODIUM CHLORIDE, SODIUM LACTATE, POTASSIUM CHLORIDE, AND CALCIUM CHLORIDE: 600; 310; 30; 20 INJECTION, SOLUTION INTRAVENOUS at 01:09

## 2023-09-21 NOTE — ANESTHESIA PREPROCEDURE EVALUATION
09/21/2023  Shireen Felix is a 77 y.o., female.      Pre-op Assessment    I have reviewed the Patient Summary Reports.    I have reviewed the NPO Status.   I have reviewed the Medications.     Review of Systems  Anesthesia Hx:  No problems with previous Anesthesia    Social:  Former Smoker, Social Alcohol Use    Hematology/Oncology:     Oncology Normal    -- Anemia:   EENT/Dental:EENT/Dental Normal   Cardiovascular:   Hypertension, well controlled CAD   Angina hyperlipidemia ECG has been reviewed. Vent. Rate : 078 BPM     Atrial Rate : 078 BPM      P-R Int : 158 ms          QRS Dur : 136 ms       QT Int : 414 ms       P-R-T Axes : 041 -51 093 degrees      QTc Int : 471 ms     Normal sinus rhythm   Left axis deviation   Left bundle branch block   Abnormal ECG   When compared with ECG of 13-AUG-2023 10:45,   No significant change was found   Confirmed by MD SANDHYA, SEGUNDO ([id number]) on 8/20/2023 4:51:30 PM       ECHO 05/2023    Summary    ? The left ventricle is normal in size with concentric hypertrophy and low normal systolic function.  ? The estimated ejection fraction is 50%.  ? Normal left ventricular diastolic function.  ? There is abnormal septal wall motion consistent with left bundle branch block.  ? Normal right ventricular size with normal right ventricular systolic function.  ? Normal central venous pressure (3 mmHg).  ? The estimated PA systolic pressure is 30 mmHg.  ? Mild mitral regurgitation.   Pulmonary:   COPD Asthma    Renal/:   Chronic Renal Disease, CKD    Hepatic/GI:   Bowel Prep. GERD    Musculoskeletal:   Arthritis   Spine Disorders: cervical, lumbar and thoracic Degenerative disease and Chronic Pain    Neurological:   CVA, no residual symptoms Neuromuscular Disease, Seizures, well controlled   Peripheral Neuropathy    Endocrine:   Diabetes, well controlled    Psych:   anxiety depression           Physical Exam  General: Well nourished, Cooperative, Alert and Oriented    Airway:  Mallampati: II   Mouth Opening: Normal  TM Distance: Normal  Tongue: Normal  Neck ROM: Normal ROM    Dental:  Dentures        Anesthesia Plan  Type of Anesthesia, risks & benefits discussed:    Anesthesia Type: MAC  Intra-op Monitoring Plan: Standard ASA Monitors  Post Op Pain Control Plan: IV/PO Opioids PRN  Informed Consent: Informed consent signed with the Patient and all parties understand the risks and agree with anesthesia plan.  All questions answered.   ASA Score: 3  Day of Surgery Review of History & Physical: H&P Update referred to the surgeon/provider.    Ready For Surgery From Anesthesia Perspective.     .

## 2023-09-21 NOTE — PLAN OF CARE
DR SANTIZO AT BEDSIDE TO SPEAK TO PT. REGARDING RESULTS.  VSS, NO GI BLEEDING, NO ABD. PAIN, NO N/V. PT. DISCHARGED FROM UNIT.

## 2023-09-21 NOTE — H&P
Short Stay Endoscopy History and Physical    PCP - Laron Chaudhari MD    Procedure - Colonoscopy  ASA - 2  Mallampati - per anesthesia  History of Anesthesia problems - no  Family history Anesthesia problems -  no     HPI:  This is a 77 y.o. female here for evaluation of :   Active Hospital Problems    Diagnosis  POA    *Chronic diarrhea [K52.9]  No    Weight loss [R63.4]  Yes      Resolved Hospital Problems   No resolved problems to display.         Health Maintenance         Date Due Completion Date    COVID-19 Vaccine (5 - Moderna series) 07/01/2022 5/6/2022    Influenza Vaccine (1) 09/01/2023 4/5/2023 (Declined)    Override on 4/5/2023: Declined (Declined for season)    DEXA Scan 11/14/2023 (Originally 1/28/2023) 1/28/2020    Hemoglobin A1c 02/29/2024 8/31/2023    Eye Exam 07/27/2024 7/27/2023    Diabetes Urine Screening 08/31/2024 8/31/2023    Lipid Panel 08/31/2024 8/31/2023    TETANUS VACCINE 11/03/2031 11/3/2021              ROS:  CONSTITUTIONAL: Denies weight change,  fatigue, fevers, chills, night sweats.  CARDIOVASCULAR: Denies chest pain, shortness of breath, orthopnea and edema.  RESPIRATORY: Denies cough, hemoptysis, dyspnea, and wheezing.  GI: See HPI.    Medical History:   Past Medical History:   Diagnosis Date    Abnormal ECG 8/5/2019    Aneurysm     Anticoagulant long-term use     Arthritis     CAD in native artery 8/5/2019    COPD (chronic obstructive pulmonary disease)     Diabetes mellitus     Fall 10/10/2019    Formatting of this note might be different from the original. Found sitting on floor next to bed last night Mild confusion today Does not recall falling or how she ended up on floor UA today Labs yesterday unremarkable    Glaucoma     Hypertension     Seizures     Shingles 05/27/2017    Stroke        Surgical History:   Past Surgical History:   Procedure Laterality Date    BRAIN SURGERY      CARDIAC CATHETERIZATION      CORONARY ANGIOPLASTY      EPIDURAL STEROID INJECTION INTO  LUMBAR SPINE Bilateral 11/12/2019    Procedure: TF XANDER L4/5;  Surgeon: Desean Dias MD;  [facility];  Service: Pain Management;  Laterality: Bilateral;    HYSTERECTOMY      INJECTION OF ANESTHETIC AGENT AROUND MEDIAL BRANCH NERVES INNERVATING LUMBAR FACET JOINT Bilateral 1/31/2020    Procedure: Bilateral L3-5 MBB;  Surgeon: Desean Dias MD;  Location: [facility];  Service: Pain Management;  Laterality: Bilateral;    INJECTION OF ANESTHETIC AGENT INTO SACROILIAC JOINT Right 11/16/2021    Procedure: Right BLOCK, SACROILIAC JOINT Right GTB with RN IV sedation;  Surgeon: Yao Fulton MD;  Location: [facility];  Service: Pain Management;  Laterality: Right;    INJECTION OF ANESTHETIC AGENT INTO SACROILIAC JOINT Bilateral 7/28/2023    Procedure: Bilateral GT bursa + bilateral SIJ injection;  Surgeon: Francisco Oshea MD;  Location: [facility];  Service: Pain Management;  Laterality: Bilateral;    INJECTION OF JOINT Bilateral 7/9/2020    Procedure: Bilateral shoulder GH Joint injection with local;  Surgeon: Desean Dias MD;  Location: [facility];  Service: Pain Management;  Laterality: Bilateral;    INJECTION OF JOINT Bilateral 7/28/2023    Procedure: Bilateral GT bursa + bilateral SIJ injection NEEDS ADDITIONAL SEDATION AND MORE TIME BEFORE INJECTION RN IV Sedation;  Surgeon: Francisco Oshea MD;  [facility]: [facility];  Service: Pain Management;  Laterality: Bilateral;    SELECTIVE INJECTION OF ANESTHETIC AGENT AROUND LUMBAR SPINAL NERVE ROOT BY TRANSFORAMINAL APPROACH Bilateral 4/26/2023    Procedure: Bilateral L4/5 TF XANDER RN IV Sedation;  Surgeon: Francisco Oshea MD;  Location: [facility];  Service: Pain Management;  Laterality: Bilateral;    TRANSFORAMINAL EPIDURAL INJECTION OF STEROID Bilateral 7/23/2019    Procedure: Bilateral L3/4 Transforaminal Epidural Steroid Injection;  Surgeon: eDsean Dias MD;  Location: [facility];  Service: Pain Management;  Laterality: Bilateral;     TRANSFORAMINAL EPIDURAL INJECTION OF STEROID Bilateral 3/10/2020    Procedure: Right T12/L1 TF XANDER with local;  Surgeon: Desean Dias MD;  Location: [facility];  Service: Pain Management;  Laterality: Bilateral;    TRANSFORAMINAL EPIDURAL INJECTION OF STEROID Right 2020    Procedure: Right T12/L1 TFESI Covid day of procedure;  Surgeon: Desean Dias MD;  Location: [facility];  Service: Pain Management;  Laterality: Right;       Family History:   Family History   Problem Relation Age of Onset    No Known Problems Mother     No Known Problems Father        Social History:   Social History     Tobacco Use    Smoking status: Former     Current packs/day: 0.00     Average packs/day: 1 pack/day for 42.3 years (42.3 ttl pk-yrs)     Types: Cigarettes     Start date:      Quit date: 2004     Years since quittin.4    Smokeless tobacco: Former   Substance Use Topics    Alcohol use: No    Drug use: Never       Allergies:   Review of patient's allergies indicates:   Allergen Reactions    Penicillins Anaphylaxis, Hives, Shortness Of Breath and Swelling    Adhesive Rash    Doxycycline Nausea Only    Oxycodone Other (See Comments)     Fatigue      Sulfa (sulfonamide antibiotics) Itching       Medications:   No current facility-administered medications on file prior to encounter.     Current Outpatient Medications on File Prior to Encounter   Medication Sig Dispense Refill    APTIOM 400 mg Tab tablet Take 400 mg by mouth.      atorvastatin (LIPITOR) 40 MG tablet TAKE 1 TABLET BY MOUTH ONCE DAILY 90 tablet 1    carvediloL (COREG) 6.25 MG tablet Take 1 tablet (6.25 mg total) by mouth 2 (two) times daily with meals. 60 tablet 0    cholecalciferol, vitamin D3, 1,250 mcg (50,000 unit) capsule Take 1 capsule (50,000 Units total) by mouth every 7 days. 12 capsule 0    cholestyramine (QUESTRAN) 4 gram packet Take 1 packet (4 g total) by mouth 2 (two) times daily. 60 packet 2    DALIRESP 500 mcg Tab TAKE 1  TABLET BY MOUTH ONCE DAILY 90 tablet 3    dicyclomine (BENTYL) 10 MG capsule Take 1 capsule (10 mg total) by mouth 2 (two) times daily. 10 capsule 0    diltiaZEM (CARDIZEM CD) 120 MG Cp24 Take 2 capsules (240 mg total) by mouth once daily. 180 capsule 1    dorzolamide-timolol 2-0.5% (COSOPT) 22.3-6.8 mg/mL ophthalmic solution Place 1 drop into both eyes every 12 (twelve) hours. 10 mL 12    fluticasone propionate (FLONASE) 50 mcg/actuation nasal spray USE 2 SPRAYS INTO EACH NOSTRIL ONCE A DAY AT 6AM AS DIRECTED 16 g 11    fluticasone-umeclidin-vilanter (TRELEGY ELLIPTA) 200-62.5-25 mcg inhaler INHALE 1 PUFF BY MOUTH ONCE A DAY 60 each 11    furosemide (LASIX) 20 MG tablet Take 20 mg by mouth daily as needed.      gabapentin (NEURONTIN) 300 MG capsule Take 1 capsule (300 mg total) by mouth 3 (three) times daily. 90 capsule 1    ipratropium (ATROVENT) 42 mcg (0.06 %) nasal spray USE 2 SPRAYS INTO EACH NOSTRIL FOUR TIMES DAILY 15 mL 11    isosorbide mononitrate (IMDUR) 60 MG 24 hr tablet Take 1 tablet (60 mg total) by mouth every evening. 90 tablet 1    latanoprost 0.005 % ophthalmic solution Place 1 drop into both eyes every evening. 2.5 mL 12    levETIRAcetam (KEPPRA) 500 MG Tab Take 1 tablet (500 mg total) by mouth 2 (two) times daily. 180 tablet 3    magnesium oxide (MAG-OX) 400 mg (241.3 mg magnesium) tablet Take 1 tablet (400 mg total) by mouth once daily. 90 tablet 1    montelukast (SINGULAIR) 10 mg tablet TAKE 1 TABLET BY MOUTH ONCE A DAY 90 tablet 3    MYRBETRIQ 50 mg Tb24 TAKE 1 TABLET BY MOUTH ONCE DAILY 30 tablet 0    nabumetone (RELAFEN) 500 MG tablet Take 1 tablet (500 mg total) by mouth 2 (two) times daily. 60 tablet 2    ondansetron (ZOFRAN-ODT) 4 MG TbDL Take 1 tablet (4 mg total) by mouth every 6 (six) hours as needed. 30 tablet 0    pantoprazole (PROTONIX) 40 MG tablet Take 40 mg by mouth 2 (two) times daily.      potassium chloride SA (K-DUR,KLOR-CON) 20 MEQ tablet Take 1 tablet (20 mEq total) by  mouth once daily. Take extra dose with Lasix - fluid pill 90 tablet 1    ranolazine (RANEXA) 1,000 mg Tb12 TAKE 1 TABLET BY MOUTH TWICE DAILY 180 tablet 3    albuterol (PROVENTIL) 2.5 mg /3 mL (0.083 %) nebulizer solution Take 2.5 mg by nebulization every 6 (six) hours as needed for Wheezing. Rescue      albuterol (PROVENTIL/VENTOLIN HFA) 90 mcg/actuation inhaler INHALE TWO PUFFS INTO THE LUNGS EVERY 4 HOURS AS NEEDED 18 g 11    clopidogreL (PLAVIX) 75 mg tablet Take 1 tablet (75 mg total) by mouth once daily. 90 tablet 3    denosumab (PROLIA) 60 mg/mL Syrg every 6 (six) months.      EScitalopram oxalate (LEXAPRO) 20 MG tablet TAKE 1 TABLET BY MOUTH ONCE DAILY 90 tablet 1    ketoconazole (NIZORAL) 2 % cream Apply topically 2 (two) times daily. For dry flaky patches of ear. 30 g 1    nitroGLYCERIN (NITROSTAT) 0.4 MG SL tablet DISSOLVE 1 TABLET UNDER TONGUE EVERY 5 MINUTES FOR CHEST PAIN (MAY TAKE UP TO 3 DOSES THEN SEEK MEDICAL ATTENTION) 25 tablet 0    triamcinolone acetonide 0.025% (KENALOG) 0.025 % cream Apply topically 2 (two) times daily. PRN rash and itching of ear. Mild steroid cream. 30 g 0       Physical Exam:  Vital Signs: There were no vitals filed for this visit.  General Appearance: Well appearing in no acute distress  ENT: OP clear  Chest: CTA B  CV: RRR, no m/r/g  Abd: s/nt/nd/nabs  Ext: no edema    Labs:Reviewed    IMP:  Active Hospital Problems    Diagnosis  POA    *Chronic diarrhea [K52.9]  No    Weight loss [R63.4]  Yes      Resolved Hospital Problems   No resolved problems to display.         Plan:   I have explained the risks and benefits of colonoscopy to the patient including but not limited to bleeding, perforation, infection, and death. The patient wishes to proceed.

## 2023-09-21 NOTE — ANESTHESIA POSTPROCEDURE EVALUATION
Anesthesia Post Evaluation    Patient: Shireen Felix    Procedure(s) Performed: Procedure(s) (LRB):  COLONOSCOPY (N/A)    Final Anesthesia Type: MAC      Patient location during evaluation: GI PACU  Patient participation: Yes- Able to Participate  Level of consciousness: awake and alert and oriented  Post-procedure vital signs: reviewed and stable  Pain management: adequate  Airway patency: patent  MEENA mitigation strategies: Multimodal analgesia  PONV status at discharge: No PONV  Anesthetic complications: no      Cardiovascular status: hemodynamically stable  Respiratory status: unassisted, spontaneous ventilation and room air  Hydration status: euvolemic  Follow-up not needed.          Vitals Value Taken Time   /60 09/21/23 1356   Temp 36.7 °C (98.1 °F) 09/21/23 1356   Pulse 97 09/21/23 1356   Resp 20 09/21/23 1356   SpO2 100 % 09/21/23 1356         Event Time   Out of Recovery 14:30:55         Pain/Riley Score: Riley Score: 9 (9/21/2023  1:56 PM)

## 2023-09-21 NOTE — [employer] PATIENT INSTRUCTIONS
Discharge Summary/Instructions after an Endoscopic Procedure  Patient Name: Shireen Felix  Patient MRN: [id number]  Patient YOB: 1945 Thursday, September 21, 2023 Joanna Leal MD  Dear patient,  As a result of recent federal legislation (The Federal Cures Act), you may   receive lab or pathology results from your procedure in your [facility]   account before your physician is able to contact you. Your physician or   their representative will relay the results to you with their   recommendations at their soonest availability.  Thank you,  RESTRICTIONS:  During your procedure today, you received medications for sedation.  These   medications may affect your judgment, balance and coordination.  Therefore,   for 24 hours, you have the following restrictions:   - DO NOT drive a car, operate machinery, make legal/financial decisions,   sign important papers or drink alcohol.    ACTIVITY:  Today: no heavy lifting, straining or running due to procedural   sedation/anesthesia.  The following day: return to full activity including work.  DIET:  Eat and drink normally unless instructed otherwise.     TREATMENT FOR COMMON SIDE EFFECTS:  - Mild abdominal pain, nausea, belching, bloating or excessive gas:  rest,   eat lightly and use a heating pad.  - Sore Throat: treat with throat lozenges and/or gargle with warm salt   water.  - Because air was used during the procedure, expelling large amounts of air   from your rectum or belching is normal.  - If a bowel prep was taken, you may not have a bowel movement for 1-3 days.    This is normal.  SYMPTOMS TO WATCH FOR AND REPORT TO YOUR PHYSICIAN:  1. Abdominal pain or bloating, other than gas cramps.  2. Chest pain.  3. Back pain.  4. Signs of infection such as: chills or fever occurring within 24 hours   after the procedure.  5. Rectal bleeding, which would show as bright red, maroon, or black stools.   (A tablespoon of blood from the rectum is not serious,  especially if   hemorrhoids are present.)  6. Vomiting.  7. Weakness or dizziness.  GO DIRECTLY TO THE NEAREST EMERGENCY ROOM IF YOU HAVE ANY OF THE FOLLOWING:      Difficulty breathing              Chills and/or fever over 101 F   Persistent vomiting and/or vomiting blood   Severe abdominal pain   Severe chest pain   Black, tarry stools   Bleeding- more than one tablespoon   Any other symptom or condition that you feel may need urgent attention  Your doctor recommends these additional instructions:  If any biopsies were taken, your doctors clinic will contact you in 1 to 2   weeks with any results.  - Discharge patient to home (via wheelchair).   - Resume previous diet.   - Continue present medications.   - Await pathology results.   - Repeat colonoscopy not recommened due to age.   - Patient has a contact number available for emergencies.  The signs and   symptoms of potential delayed complications were discussed with the   patient.  Return to normal activities tomorrow.  Written discharge   instructions were provided to the patient.  For questions, problems or results please call your physician Joanna Leal MD at Work:  [phone]  If you have any questions about the above instructions, call the GI   department at [phone] or call the endoscopy unit at [phone]   from 7am until 3 pm.  [facility] [place], EMERGENCY ROOM PHONE NUMBER:   [phone]  IF A COMPLICATION OR EMERGENCY SITUATION ARISES AND YOU ARE UNABLE TO REACH   YOUR PHYSICIAN - GO DIRECTLY TO THE EMERGENCY ROOM.  I have read or have had read to me these discharge instructions for my   procedure and have received a written copy.  I understand these   instructions and will follow-up with my physician if I have any questions.     __________________________________       _____________________________________  Nurse Signature                                          Patient/Designated   Responsible Party  Signature  MD Joanna Arita MD  9/21/2023 1:52:07 PM  [employer]

## 2023-09-21 NOTE — TRANSFER OF CARE
Anesthesia Transfer of Care Note    Patient: Shireen Felix    Procedure(s) Performed: Procedure(s) (LRB):  COLONOSCOPY (N/A)    Patient location: GI    Anesthesia Type: MAC    Transport from OR: Transported from OR on room air with adequate spontaneous ventilation    Post pain: adequate analgesia    Post assessment: no apparent anesthetic complications and tolerated procedure well    Post vital signs: stable    Level of consciousness: awake, alert and oriented    Nausea/Vomiting: no nausea/vomiting    Complications: none    Transfer of care protocol was followed      Last vitals:   Visit Vitals  LMP  (LMP Unknown)   Breastfeeding No

## 2023-09-21 NOTE — DISCHARGE SUMMARY
[facility] (Hospital)  Discharge Note  Short Stay    Procedure(s) (LRB):  COLONOSCOPY (N/A)      OUTCOME: Patient tolerated treatment/procedure well without complication and is now ready for discharge.    DISPOSITION: Home or Self Care    FINAL DIAGNOSIS:  Chronic diarrhea    FOLLOWUP: In clinic    DISCHARGE INSTRUCTIONS:  No discharge procedures on file.

## 2023-09-22 VITALS
OXYGEN SATURATION: 100 % | TEMPERATURE: 98 F | DIASTOLIC BLOOD PRESSURE: 66 MMHG | RESPIRATION RATE: 20 BRPM | SYSTOLIC BLOOD PRESSURE: 138 MMHG | HEART RATE: 89 BPM

## 2023-09-25 LAB
FINAL PATHOLOGIC DIAGNOSIS: NORMAL
GROSS: NORMAL
Lab: NORMAL

## 2023-10-03 ENCOUNTER — DOCUMENT SCAN (OUTPATIENT)
Dept: HOME HEALTH SERVICES | Facility: HOSPITAL | Age: 78
End: 2023-10-03
Payer: MEDICARE

## 2023-10-04 ENCOUNTER — DOCUMENTATION ONLY (OUTPATIENT)
Dept: INTERNAL MEDICINE | Facility: CLINIC | Age: 78
End: 2023-10-04
Payer: MEDICARE

## 2023-10-05 ENCOUNTER — EXTERNAL HOME HEALTH (OUTPATIENT)
Dept: HOME HEALTH SERVICES | Facility: HOSPITAL | Age: 78
End: 2023-10-05
Payer: MEDICARE

## 2023-10-09 ENCOUNTER — TELEPHONE (OUTPATIENT)
Dept: NEUROLOGY | Facility: CLINIC | Age: 78
End: 2023-10-09
Payer: MEDICARE

## 2023-10-09 NOTE — TELEPHONE ENCOUNTER
Spoke with patient brother in regards to him wanting to rescheduled appt.  Patient was switched over to virtual for the same day. He verbalized understanding.

## 2023-10-09 NOTE — TELEPHONE ENCOUNTER
----- Message from Evette Elida sent at 10/9/2023 10:21 AM CDT -----  Contact: pt's brother/Rm  Pt is calling in regard to parul the appt for the pt on 10/11 to next week if possible.  Please call him back at [phone] thanks/mpd

## 2023-10-10 ENCOUNTER — PATIENT OUTREACH (OUTPATIENT)
Dept: ADMINISTRATIVE | Facility: HOSPITAL | Age: 78
End: 2023-10-10
Payer: MEDICARE

## 2023-10-10 ENCOUNTER — TELEPHONE (OUTPATIENT)
Dept: PAIN MEDICINE | Facility: CLINIC | Age: 78
End: 2023-10-10
Payer: MEDICARE

## 2023-10-10 NOTE — TELEPHONE ENCOUNTER
----- Message from Lamin Granados sent at 10/10/2023  4:10 PM CDT -----  Contact: Shireen Iyer is needing a call back in regards to being scheduled after a fall. Please give her a call back at [phone]

## 2023-10-10 NOTE — TELEPHONE ENCOUNTER
Called patient regarding message.  Speaking with brother, patient has an appt at 9:00 am with Dr. Oshea

## 2023-10-11 ENCOUNTER — OFFICE VISIT (OUTPATIENT)
Dept: PAIN MEDICINE | Facility: CLINIC | Age: 78
End: 2023-10-11
Payer: MEDICARE

## 2023-10-11 ENCOUNTER — HOSPITAL ENCOUNTER (OUTPATIENT)
Dept: RADIOLOGY | Facility: HOSPITAL | Age: 78
Discharge: HOME | End: 2023-10-11
Attending: ANESTHESIOLOGY
Payer: MEDICARE

## 2023-10-11 VITALS
DIASTOLIC BLOOD PRESSURE: 65 MMHG | SYSTOLIC BLOOD PRESSURE: 119 MMHG | HEIGHT: 68 IN | RESPIRATION RATE: 18 BRPM | WEIGHT: 108 LBS | BODY MASS INDEX: 16.37 KG/M2 | HEART RATE: 81 BPM

## 2023-10-11 DIAGNOSIS — M46.1 SACROILIITIS: Primary | ICD-10-CM

## 2023-10-11 DIAGNOSIS — M53.3 COCCYDYNIA: ICD-10-CM

## 2023-10-11 DIAGNOSIS — M47.812 CERVICAL SPONDYLOSIS: ICD-10-CM

## 2023-10-11 DIAGNOSIS — M70.60 GREATER TROCHANTERIC BURSITIS, UNSPECIFIED LATERALITY: ICD-10-CM

## 2023-10-11 PROCEDURE — 3288F PR FALLS RISK ASSESSMENT DOCUMENTED: ICD-10-PCS | Mod: CPTII,S$GLB,, | Performed by: ANESTHESIOLOGY

## 2023-10-11 PROCEDURE — 1100F PTFALLS ASSESS-DOCD GE2>/YR: CPT | Mod: CPTII,S$GLB,, | Performed by: ANESTHESIOLOGY

## 2023-10-11 PROCEDURE — 1160F RVW MEDS BY RX/DR IN RCRD: CPT | Mod: CPTII,S$GLB,, | Performed by: ANESTHESIOLOGY

## 2023-10-11 PROCEDURE — 99999 PR PBB SHADOW E&M-EST. PATIENT-LVL V: ICD-10-PCS | Mod: PBBFAC,,, | Performed by: ANESTHESIOLOGY

## 2023-10-11 PROCEDURE — 96372 PR INJECTION,THERAP/PROPH/DIAG2ST, IM OR SUBCUT: ICD-10-PCS | Mod: S$GLB,,, | Performed by: ANESTHESIOLOGY

## 2023-10-11 PROCEDURE — 1125F AMNT PAIN NOTED PAIN PRSNT: CPT | Mod: CPTII,S$GLB,, | Performed by: ANESTHESIOLOGY

## 2023-10-11 PROCEDURE — 1100F PR PT FALLS ASSESS DOC 2+ FALLS/FALL W/INJURY/YR: ICD-10-PCS | Mod: CPTII,S$GLB,, | Performed by: ANESTHESIOLOGY

## 2023-10-11 PROCEDURE — 72220 X-RAY EXAM SACRUM TAILBONE: CPT | Mod: TC

## 2023-10-11 PROCEDURE — 99214 OFFICE O/P EST MOD 30 MIN: CPT | Mod: 25,S$GLB,, | Performed by: ANESTHESIOLOGY

## 2023-10-11 PROCEDURE — 3078F PR MOST RECENT DIASTOLIC BLOOD PRESSURE < 80 MM HG: ICD-10-PCS | Mod: CPTII,S$GLB,, | Performed by: ANESTHESIOLOGY

## 2023-10-11 PROCEDURE — 1160F PR REVIEW ALL MEDS BY PRESCRIBER/CLIN PHARMACIST DOCUMENTED: ICD-10-PCS | Mod: CPTII,S$GLB,, | Performed by: ANESTHESIOLOGY

## 2023-10-11 PROCEDURE — 72052 X-RAY EXAM NECK SPINE 6/>VWS: CPT | Mod: 26,,, | Performed by: STUDENT IN AN ORGANIZED HEALTH CARE EDUCATION/TRAINING PROGRAM

## 2023-10-11 PROCEDURE — 3074F SYST BP LT 130 MM HG: CPT | Mod: CPTII,S$GLB,, | Performed by: ANESTHESIOLOGY

## 2023-10-11 PROCEDURE — 96372 THER/PROPH/DIAG INJ SC/IM: CPT | Mod: S$GLB,,, | Performed by: ANESTHESIOLOGY

## 2023-10-11 PROCEDURE — 3074F PR MOST RECENT SYSTOLIC BLOOD PRESSURE < 130 MM HG: ICD-10-PCS | Mod: CPTII,S$GLB,, | Performed by: ANESTHESIOLOGY

## 2023-10-11 PROCEDURE — 1159F MED LIST DOCD IN RCRD: CPT | Mod: CPTII,S$GLB,, | Performed by: ANESTHESIOLOGY

## 2023-10-11 PROCEDURE — 1159F PR MEDICATION LIST DOCUMENTED IN MEDICAL RECORD: ICD-10-PCS | Mod: CPTII,S$GLB,, | Performed by: ANESTHESIOLOGY

## 2023-10-11 PROCEDURE — 72220 XR SACRUM AND COCCYX: ICD-10-PCS | Mod: 26,,, | Performed by: RADIOLOGY

## 2023-10-11 PROCEDURE — 3078F DIAST BP <80 MM HG: CPT | Mod: CPTII,S$GLB,, | Performed by: ANESTHESIOLOGY

## 2023-10-11 PROCEDURE — 99214 PR OFFICE/OUTPT VISIT, EST, LEVL IV, 30-39 MIN: ICD-10-PCS | Mod: 25,S$GLB,, | Performed by: ANESTHESIOLOGY

## 2023-10-11 PROCEDURE — 1125F PR PAIN SEVERITY QUANTIFIED, PAIN PRESENT: ICD-10-PCS | Mod: CPTII,S$GLB,, | Performed by: ANESTHESIOLOGY

## 2023-10-11 PROCEDURE — 72220 X-RAY EXAM SACRUM TAILBONE: CPT | Mod: 26,,, | Performed by: RADIOLOGY

## 2023-10-11 PROCEDURE — 3288F FALL RISK ASSESSMENT DOCD: CPT | Mod: CPTII,S$GLB,, | Performed by: ANESTHESIOLOGY

## 2023-10-11 PROCEDURE — 72052 XR CERVICAL SPINE 5 VIEW WITH FLEX AND EXT: ICD-10-PCS | Mod: 26,,, | Performed by: STUDENT IN AN ORGANIZED HEALTH CARE EDUCATION/TRAINING PROGRAM

## 2023-10-11 PROCEDURE — 72052 X-RAY EXAM NECK SPINE 6/>VWS: CPT | Mod: TC

## 2023-10-11 PROCEDURE — 99999 PR PBB SHADOW E&M-EST. PATIENT-LVL V: CPT | Mod: PBBFAC,,, | Performed by: ANESTHESIOLOGY

## 2023-10-11 RX ORDER — KETOROLAC TROMETHAMINE 30 MG/ML
30 INJECTION, SOLUTION INTRAMUSCULAR; INTRAVENOUS ONCE
Status: COMPLETED | OUTPATIENT
Start: 2023-10-11 | End: 2023-10-11

## 2023-10-11 RX ADMIN — KETOROLAC TROMETHAMINE 30 MG: 30 INJECTION, SOLUTION INTRAMUSCULAR; INTRAVENOUS at 10:10

## 2023-10-11 NOTE — H&P (VIEW-ONLY)
Established Patient Interventional Pain Clinic Visit    Chief Pain Complaint:  Chief Complaint   Patient presents with    Low-back Pain    Neck Pain     Patient fell 10/10/23 and hit her head on a table and landed on her back.  Patient has pain in her back and neck/shoulder areas.  Pain scale 8/10         Interval history 10/11/2023  Patient presents status post bilateral sacroiliac joint and greater trochanteric bursa injection 07/28/2023.  Patient reports approximately 90% sustained relief overlying bilateral sacroiliac joint and GT bursa.  Today she presents following a mechanical fall.  Patient reports approximately 5 days prior going to the bathroom in the middle of the night, losing her balance and hitting the back of her head and lower back.  Today she reports pain which is constant which is rated an 8/10.  Patient reports pain along bilateral cervical paraspinous musculature which is exacerbated with cervical flexion/extension and lateral flexion.  She reports this pain has improved over the last few days.  Her primary concern is pain along her coccyx.  Patient reports tenderness to touch in pain with sitting as well as sitting or standing.  Patient is requesting x-rays for evaluation.  She also would like to restart physical therapy.  Of note she is going to [facility] in [place] and would like to restart dry needling, soft massage and assistance with balance.    Interval History (6/27/2023):  Shireen Felix presents today for follow-up visit.  Patient was last seen on 5/16/2023. She reports some improvement in her pain with TPIs given at last visit. She reports continued variations in her BP throughout the day. She has followed up with Neurology who has made changes to her seizure medications. Seems to be tolerating med change well. Continues to monitor BP. Has followed up with PCP and other specialist to rule out other causes for BP changes (renal, intracranial, etc).  Patient reports pain as 7/10 today.  No changes on CT compared to previous.  Interval History (6/14/2023):  Shireen Felix presents today for trigger point injections.  Patient was last seen on 06/09/2023. Patient reports pain as 8/10 today. She continues to report pain as primarily located in her lower lumbosacral region with radiation into her buttocks, right worse than left, and wrapping around her lateral hips. She reports additional falls since last visit. She reports worsening neck pain with radiation into her left arm down to her hand with cramping and burning.  She also reports worsening symptoms down both legs.  interfering with her ability to walk at times due to significant weakness. She reports repeated falls due to her legs giving out from underneath her unexpectedly.       Interval History (6/9/2023):  Shireen Felix presents today via telemed for follow-up visit.  Patient was last seen on 05/16/2023. Last injection bilateral L4/5 TFESI on 4/26/23 with 80% relief. Patient reports pain as 10/10 today. She reports pain that began over the past several weeks, became severe 2 days ago. She reports pain is primarily located in her lower lumbosacral region with radiation into her buttocks, right worse than left, and wrapping around her lateral hips. Pain is similar to that prior to SIJ and GT bursa injection, last SIJ injection 11/16/2021 with excellent relief until a few weeks ago when pain returned. She reports in the past she has also received trigger point injections in her lower back that were very helpful.    Interval History (5/16/2023): Shireen Felix presents today for follow-up visit.  she underwent bilateral L4/5 TFESI on 4/26/23.  The patient reports that she is/was better following the procedure.  she reports 80% pain relief.  The changes lasted 4 weeks so far.  The changes have continued through this visit.  Patient reports pain as 7/10 today. She reports the procedure itself was very painful. Reports she did not  feel sedated at all compared to previous procedures with other providers. She is not interested in pursuing additional procedures secondary to the painful procedure.  She reports worsening neck pain with radiation into her right arm down to her hand with cramping and burning.    Interval History (3/9/2023):  Shireen Felix presents today for follow-up visit for CT and EMG review.  Patient was last seen on 2/1/2023. At that visit, the plan was to order updated CT of her cervical and lumbar spine as well as EMG of her bilateral upper extremities. Patient reports pain as 7/10 today.  She reports worsening neck pain with radiation into her left arm down to her hand with cramping and burning.  She also reports worsening symptoms down both legs.  interfering with her ability to walk at times due to significant weakness. Has an appointment with Dr. Navarro on 03/21/2023. Patient fell hit head on concrete 2-11-23, and had a seizure shortly after.  She reports repeated falls due to her legs giving out from underneath her unexpectedly. Referred to Neurology.  She is also currently on antibiotics secondary to having 10 teeth pulled     Interval History (1/31/2023):  Shireen Felix presents today for follow-up visit.  Patient was last seen on 10/4/2022. At that visit, the plan was to schedule bilateral L3-5 MBB followed 2 weeks later by XANDER.  She canceled these procedures and reports at this time she is not interested in scheduling.  She reports she has a  and goes to the gym 3-4 times per week which includes walking on a treadmill.   Patient reports pain as 7/10 today. She reports worsening neck pain with radiation into her left arm down to her hand with cramping and burning. This has been severe over the past week. Last cervical CT in 2019, she reports she has sustained multiple falls since then. She also reports worsening symptoms down her left leg, interfering with her ability to walk at times due  to significant weakness.      Interval history 10/04/2022  Ms. Felix is a 76-year-old female with past medical history significant for cerebral aneurysm status post craniotomy and repair, cerebral vascular accident, left V1 distribution post herpetic neuralgia, depression, glaucoma, asthma/COPD, coronary artery disease, GERD, nicotine dependence, type 2 diabetes who presents to [facility] care, previous Dr. Dias and Dr. Fulton patient.  Today patient reports pain in the lower back and legs.  Pain is constant today is rated 7/10.  Patient reports pain in a bandlike distribution in the lower back which radiates down the posterior aspects of bilateral lower extremities in L5-S1 distribution to mid thigh.  Pain is described as burning and aching in nature.  Pain is exacerbated when moving from sitting to standing and standing and ambulating just a few feet.  Patient does report associated weakness in the lower extremities associated with her pain.  Patient reports she is able to ambulate with assistive device, walker only a few feet before requiring rest.  Pain has been improved with prior procedures, including medial branch block and transforaminal epidural steroid injection.  Patient is interested in pursuing repeat injection.  Of note patient has trialed medicinal marijuana with Dr. Mckeon and reports palpitations side effect.  Patient has discontinued tramadol, tizanidine and gabapentin.  Pain is improved with prednisone which she takes prescribed by her pulmonologist.    Interval history:  Dr. Fulton:  05/10/2021-05/06/2022  Shireen Felix is a 76 y.o. female  who is presenting with a chief complaint of Neck Pain. The patient began experiencing this problem insidiously, and the pain has been gradually worsening over the past 6 month(s). The pain is described as throbbing, cramping, aching and heavy and is located in the bilateral cervical spine. Pain is intermittent and lasts hours. The  pain is nonradiating.  The patient rates her pain a 7 out of ten and interferes with activities of daily living a 7 out of ten. Pain is exacerbated by rotation of the cervical spine, and is improved by rest. Patient reports no prior trauma, no prior spinal surgery      This patient is a 74 y.o. female who presents today complaining of the above noted pain/s. The patient describes the pain as follows.  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  She reports approximately 2 years ago she had to sudden deaths in the family which caused very stressful time for her and resulted in shingles rash in the left V1 distribution however she reports having excruciating pain in the left V1 and V2 distributions.  She denies having difficulty with eating food and brushing her teeth on the left side of her face however the left lateral side of her nose gets her excruciating pain.  She has been tried on numerous medications in the past including gabapentin, Lyrica, Valtrex, Celebrex, ibuprofen which have all provided minimal benefit however steroids have been most helpful.  Today she rates her pain as an 4/10 and describes a constant burning sensation the left side of her face in addition to radiation into her bilateral lower extremities, her right shoulder, her left upper extremity occasionally as a pins and needle sensation.  She does report having numbness and weakness in her left lower extremity.  She has been physical therapy which she completed in February of 2019 however this caused most of her low back and leg symptoms to worsen in addition to her post herpetic neuralgia to worsen.  She denies having bowel bladder difficulties.  Her symptoms are worse with activity such as walking in her somewhat improved with rest however she had finds it difficult to get into a comfortable position. She has been using topical  lidocaine patches and applying to the left side of her face which does provide significant benefit.  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     Shireen Felix is a 76 y.o. female  who is presenting with a chief complaint of lumbar spine. The patient began experiencing this problem insidiously, and the pain has been gradually worsening over the past 2 year(s). The pain is described as throbbing, shooting, burning, aching and electrical and is located in the bilateral lumbar spine. Pain is intermittent and lasts hours. The pain radiates to bilateral lower extremities. The patient rates her pain a 7 out of ten and interferes with activities of daily living a 6 out of ten. Pain is exacerbated by flexion of the lumbar spine, and is improved by rest. Patient reports no prior trauma, no prior spinal surgery     Interval HPI 06/03/2020: Dr. Dias:  Ms. Felix returns to clinic for follow-up.  She underwent a T12-L1 transforaminal epidural steroid injection on March 10, 2020 and she reports that this has provided significant pain relief for her which radiated into her right leg prior to the injection.  She does report that his symptoms have returned and the injections worn off her pain is currently rated 7/10.  She was scheduled to undergo bilateral lumbar radiofrequency ablation prior to corona virus however she would like to hold off on that at this time and pursue repeat injection in the low back.  Unfortunately she also reports that she had her wheelchair fall over a few weeks ago and this has resulted in bilateral shoulder pain.  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  She finds that rest does provide some pain relief on activity causes her symptoms to worsen.  She has  been able to walk significantly more after her most recent injection in March reported she is able to walk several steps before having to sit rest however this has a vast improvement from prior to the injection.    Interval HPI:  02/12/2020; Dr. Dias  Ms. Felix returns to clinic for follow-up.  She underwent bilateral lumbar medial branch blocks on January 31, 2020 and reports 100% symptomatic pain relief for approximately 1 week and his symptoms slowly began to return.  She continues to have some right-sided lower thoracic posterior pain which was not completely dressed with the medial branch blocks.  Today she rates her pain as an 8/10 which is located primarily in the axial lumbar spine and the lower right posterior thoracic region.  She denies having numbness weakness in the legs but does continue to have a constant aching and throbbing sensation in the low back.    Interval HPI 01/23/2020: Dr. Dias: Ms. Felix returns to clinic for follow-up.  She reports that she underwent bilateral L4/5 transforaminal epidural steroid injections in November 2019 reports ongoing 80% symptomatic pain relief.  She has also been participating physical therapy which she has found to be very helpful however 4 days ago she started to do some leg raise is in her bed and she felt severe pain in her low back which has not subsided.  She denies having any radiation except for around her flank into her anterior abdomen.  She feels like the pain sometimes comes straight through from her back to her abdomen.  Today she rates her pain as a 10/10 describes it as a constant aching and sharp pain. She has been using a heating pad which is minimally helpful and she has been holding off on physical therapy at this time. She denies having bowel bladder difficulty.     Interval History: Patient was seen on 8/4/19. At that time she underwent bilateral L3/4 TF XANDER.  The patient reports that she is/was better following the procedure.  she reports  75% pain relief.  She had a fall, then pain increased. She is currently living at [facility]. She is doing at home therapy there, which is helping. She is in wheelchair now but hopes to transition to walker soon.  After the fall, she was not able to get out of the bed.      Initial History of Present Illness:  Dr. Dias  This patient is a 74 y.o. female who presents today complaining of the above noted pain/s. The patient describes the pain as follows.  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  She reports approximately 2 years ago she had to sudden deaths in the family which caused very stressful time for her and resulted in shingles rash in the left V1 distribution however she reports having excruciating pain in the left V1 and V2 distributions.  She denies having difficulty with eating food and brushing her teeth on the left side of her face however the left lateral side of her nose gets her excruciating pain.  She has been tried on numerous medications in the past including gabapentin, Lyrica, Valtrex, Celebrex, ibuprofen which have all provided minimal benefit however steroids have been most helpful.  Today she rates her pain as an 8/10 and describes a constant burning sensation the left side of her face in addition to radiation into her bilateral lower extremities, her right shoulder, her left upper extremity occasionally as a pins and needle sensation.  She does report having numbness and weakness in her left lower extremity.  She has been physical therapy which she completed in February of 2019 however this caused most of her low back and leg symptoms to worsen in addition to her post herpetic neuralgia to worsen.  She denies having bowel bladder difficulties.  Her symptoms are worse with activity such as walking in her somewhat improved with rest however she had finds it  difficult to get into a comfortable position. She has been using topical lidocaine patches and applying to the left side of her face which does provide significant benefit.  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    - pertinent negatives: No fever, No chills, No weight loss, No bladder dysfunction, No bowel dysfunction, No saddle anesthesia  - pertinent positives: generalized nonspecific Lower Extremity weakness bilaterally    - medications, other therapies tried (physical therapy, injections):     >> NSAIDs, Tylenol, Tramadol, Norco, gabapentin, lyrica, flexeril and medrol dose pack    >> Has previously undergone Physical Therapy      Pain injections:    Dr. Oshea:  -07/28/2023: Bilateral sacroiliac joint and greater trochanteric bursa injection  -04/26/2023: bilateral L4/5 TFESI with 80% relief      -11/16/2021: Right-sided SI joint and greater trochanteric bursa injection; Dr. Fulton  -07/09/2020: Bilateral glenohumeral joint injection; Dr. Dias  -06/19/2020: Right-sided T12/L1 transforaminal epidural steroid injection; Dr. Dias  -03/10/2020:  T12/L1 transforaminal epidural steroid injection; Dr. Dias  -01/31/2020: Bilateral L3-5 medial branch blocks; Dr. Dias  -11/12/2019: Bilateral L4/5 transforaminal epidural steroid injection; Dr. Dias      Imaging / Labs / Studies (reviewed on 10/11/2023):    CT Cervical spine 06/23/2023  FINDINGS:  There is unchanged grade 1 anterolisthesis of C3 on C4 and C4 on C5.  There is no new spondylolisthesis.  There is no loss of vertebral body height.  There is multilevel degenerative disc space narrowing most significant at C5-C6 and C6-C7.  There is unchanged diffuse degenerative facet arthropathy with posterior disc osteophyte complexes and uncovertebral osteophytosis resulting in varying degrees of canal and foraminal stenosis.  This  is incompletely evaluated on this exam.     The included portions of the posterior fossa are normal.  The craniocervical junction is symmetric.  The atlantoaxial articulation is normal.  The airway is widely patent.  The thyroid gland is normal.  There is no cervical adenopathy.  The visualized lung apices are clear.     Impression:     Multilevel degenerative spondylolisthesis and spondylosis resulting in varying degrees of canal and foraminal stenosis, grossly unchanged from prior exam.    CT lumbar spine 06/23/2023  FINDINGS:  Five non-rib-bearing lumbar type vertebral bodies are present.  There is mild Levoscoliotic curvature of the lumbar spine centered at L3, similar compared to the prior.     With regards to alignment, there is minimal right lateral listhesis of L1 on L2 and L2 on L3.  There is minimal left lateral listhesis of L3 on L4.  No anterolisthesis or retrolisthesis.     There are chronic compression deformities through the superior endplate of T12, inferior endplate of L1 and superior endplate of L2.  Degree of vertebral body height loss is stable since 02/07/2023.  Vertebral body heights from L3-L5 are preserved.     Mild to severe degenerative disc changes are present throughout the lumbar spine, worst at the right half of L3-L4 and L4-L5.  Partially calcified disc bulges are seen at L2-L3, L3-L4, L4-L5 and L5-S1.  Evaluation for central canal narrowing is limited without intrathecal contrast.  Degrees of central canal stenosis appear worst at L3-L4 and L4-L5.     Hypertrophic facet changes are present throughout the lumbar spine, worst at L3-L4 and L4-L5.     Paraspinal soft tissues appear within normal limits.  Both SI joints appear intact with mild degenerative changes, similar compared to the prior.     Impression:     1. Multilevel degenerative disc and hypertrophic facet changes similar compared to 02/07/2023.  2. Chronic appearing compression deformities at T12, L1 and L2, similar compared  to 02/07/2023.  Dense of acute injury.      01/23/20    X-Ray Lumbar Spine Ap And Lateral  FINDINGS:  Levoscoliosis present.  Vertebral body heights stable.  Alignment unchanged without spondylolisthesis.  Similar appearing multilevel degenerative disc height loss and osteophyte findings noted.  Multilevel facet degenerative findings remain.  Aorta iliac atherosclerotic calcification.  Colonic constipation findings present.    6/04/19    X-Ray Cervical Spine AP And Lateral    FINDINGS:  Reversal normal C-spine curvature noted on the lateral view with visualization to the C7-T1 level.  Minimal anterior subluxation C4 on C5 noted.  There is multilevel marginal spurring and varying degrees of loss of disc height throughout the C-spine.  No acute fracture identified in prevertebral soft tissues, C1-2 articulation and odontoid appear within normal limits allowing for positioning.  Minimal vascular calcification identified on the left in the area of the carotid vessels.    EMG 02/24/2023  IMPRESSION  1. ABNORMAL study  2. 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  There is an acute on chronic radiculopathy of the LEFT C6 nerve root and a subacute on chronic of the RIGHT C4 and C5 nerve roots and a CHRONIC radiculopathy of bilateral C4-T1 nerve roots    Review of Systems:  CONSTITUTIONAL: patient denies any fever, chills, or weight loss  SKIN: patient denies any rash or itching  RESPIRATORY: patient denies having any shortness of breath  GASTROINTESTINAL: patient denies having any diarrhea, constipation, or bowel incontinence  GENITOURINARY: patient denies having any abnormal bladder function    MUSCULOSKELETAL:  - patient complains of the above noted pain/s (see  "chief pain complaint)    NEUROLOGICAL:   - pain as above  - strength in Upper and Lower extremities is decreased, BILATERALLY  - sensation in Upper extremities is intact, BILATERALLY  - patient denies any loss of bowel or bladder control      PSYCHIATRIC: patient denies any change in mood    Other:  All other systems reviewed and are negative      Physical Exam:  /65   Pulse 81   Resp 18   Ht 5' 8" (1.727 m)   Wt 49 kg (108 lb 0.4 oz)   LMP  (LMP Unknown)   BMI 16.43 kg/m²  (reviewed on 10/11/2023)  Physical Exam    GENERAL: Well appearing, in no acute distress, alert and oriented x3.  PSYCH:  Mood and affect appropriate.  SKIN: Skin color, texture, turgor normal, no rashes or lesions.  HEAD/FACE:  Normocephalic, bruising along right lower jaw, Cranial nerves grossly intact.    CV: RRR with palpation of the radial artery.  PULM: No evidence of respiratory difficulty, symmetric chest rise.  GI:  Soft and non-tender.  Neck: TTP along cervical facet joints, positive facet loading, spurlings positive bilaterally, pain with flexion, extension, and rotation - right greater than left  BACK: Straight leg raising in the sitting and supine positions is negative to radicular pain. pain to palpation over the facet joints of the lumbar spine or spinous processes.  Reduced range of motion with pain reproduction.  Tenderness to palpation along coccyx.   EXTREMITIES:  Peripheral joint range of motion is reduced with pain with obvious instability in bilateral lower extremities.  No deformities, edema, or skin discoloration. Good capillary refill.  SIJ testing:  - TTP over SI joint: Present  - Madhavi's/ Hank's: Positive    - Sacroiliac Distraction Test (anterior pressure): Positive  - Sacroiliac Compression Test (lateral pressure): Positive   - SacralThrust Test (posterior pressure): Positive  -TTP along bilateral GT bursa   MUSCULOSKELETAL:  Unable to stand on heels and toes  hip, and knee provocative maneuvers are " negative.  There is no pain with palpation over the sacroiliac joints bilaterally.  Gaenslen's, Distraction/Compression and  FABERs test is negative.  Facet loading test is positive bilaterally.   Bilateral upper and lower extremity strength is normal and symmetric.  No atrophy or tone abnormalities are noted.    RIGHT Lower extremity: Hip flexion 5/5, Hip Abduction 5/5, Hip Adduction 5/5, Knee extension 5/5, Knee flexion 5/5, Ankle dorsiflexion5/5, Extensor hallucis longus 5/5, Ankle plantarflexion 5/5  LEFT Lower extremity:  Hip flexion 5/5, Hip Abduction 5/5,Hip Adduction 5/5, Knee extension 5/5, Knee flexion 5/5, Ankle dorsiflexion 5/5, Extensor hallucis longus 5/5, Ankle plantarflexion 5/5  -Normal testing knee (patellar) jerk and ankle (achilles) jerk    NEURO: Bilateral upper and lower extremity coordination and muscle stretch reflexes are physiologic and symmetric. No loss of sensation is noted.  GAIT:  Patient presents in wheelchair today.  Antalgic gait.      Assessment:    Shireen Felix is a 77 y.o. year old female who is presenting with   Encounter Diagnoses   Name Primary?    Sacroiliitis Yes    Greater trochanteric bursitis, unspecified laterality     Coccydynia     Cervical spondylosis              Plan:    1. Interventional:  Schedule for sacrococcygeal ligament injection to see if this helps with coccydynia.  We discussed the procedure, benefits, potential risk and alternative options in detail.  Patient has elected to pursue this procedure.    Anticoagulation:  Yes, Plavix  Will obtain cardiac clearance from Dr. Dye to pause Plavix 5 days prior to sacrococcygeal ligament injection.      2. Pharmacologic:     - Procedure note: An IM injection of (ketolorac 30mg/1mL) was administered during clinic visit.  This was well tolerated.    -Continue Gabapentin 100 mg BID. We have reviewed potential side effects of this medication including daytime somnolence, weight gain and peripheral  edema    -Continue tizanidine 4-8 mg prn pain.We have discussed potential deleterious side effects associated with this medication including  dizziness, drowsiness, dry mouth or tingling sensation in the upper or lower extremities.     -Continue Nabumetone 500 mg BID for inflammation and pain. We have discussed potential deleterious side effects of NSAIDs on the cardiovascular, gastrointestinal and renal systems. We have discussed judicious use of this medication. Pt expresses understanding.       3. Rehabilitative:   -We discussed initiating physical therapy to help manage the patient/s painful condition. The patient was counseled that muscle strengthening will improve the long term prognosis in regards to pain and may also help increase range of motion and mobility. They were told that one of the goals of physical therapy is that they learn how to do the exercises so that they can do them independently at home daily upon completion. The patient's questions were answered and they were agreeable to this course. A referral for physical therapy: [facility] ([facility] [place]; dry needling+ soft tissue massage requested) was provided to the patient.      4. Diagnostic:  X-ray cervical spine and sacrum/coccyx to better evaluate pain    5. Follow up: 4 weeks after injection    Francisco Oshea MD      The above plan and management options were discussed at length with patient. Patient is in agreement with the above and verbalized understanding.    - I discussed the goals of interventional chronic pain management with the patient on today's visit. We discussed a multimodal and systematic approach to pain.  This includes diagnostic and therapeutic injections, adjuvant pharmacologic treatment, physical therapy, and at times psychiatry.  I emphasized the importance of regular exercise, core strengthening and stretching, diet and weight loss as a cornerstone of long-term pain management.    - This condition does not require this patient to  take time off of work, and the primary goal of our Pain Management services is to improve the patient's functional capacity.  - Patient Questions: Answered all of the patient's questions regarding diagnoses, therapy, treatment and next steps    Disclaimer:  This note may have been prepared using voice recognition software, it may have not been extensively proofed, as such there could be errors within the text such as sound alike errors.

## 2023-10-11 NOTE — PROGRESS NOTES
Established Patient Interventional Pain Clinic Visit    Chief Pain Complaint:  Chief Complaint   Patient presents with    Low-back Pain    Neck Pain     Patient fell 10/10/23 and hit her head on a table and landed on her back.  Patient has pain in her back and neck/shoulder areas.  Pain scale 8/10         Interval history 10/11/2023  Patient presents status post bilateral sacroiliac joint and greater trochanteric bursa injection 07/28/2023.  Patient reports approximately 90% sustained relief overlying bilateral sacroiliac joint and GT bursa.  Today she presents following a mechanical fall.  Patient reports approximately 5 days prior going to the bathroom in the middle of the night, losing her balance and hitting the back of her head and lower back.  Today she reports pain which is constant which is rated an 8/10.  Patient reports pain along bilateral cervical paraspinous musculature which is exacerbated with cervical flexion/extension and lateral flexion.  She reports this pain has improved over the last few days.  Her primary concern is pain along her coccyx.  Patient reports tenderness to touch in pain with sitting as well as sitting or standing.  Patient is requesting x-rays for evaluation.  She also would like to restart physical therapy.  Of note she is going to [facility] in [place] and would like to restart dry needling, soft massage and assistance with balance.    Interval History (6/27/2023):  Shireen Felix presents today for follow-up visit.  Patient was last seen on 5/16/2023. She reports some improvement in her pain with TPIs given at last visit. She reports continued variations in her BP throughout the day. She has followed up with Neurology who has made changes to her seizure medications. Seems to be tolerating med change well. Continues to monitor BP. Has followed up with PCP and other specialist to rule out other causes for BP changes (renal, intracranial, etc).  Patient reports pain as 7/10 today.  No changes on CT compared to previous.  Interval History (6/14/2023):  Shireen Felix presents today for trigger point injections.  Patient was last seen on 06/09/2023. Patient reports pain as 8/10 today. She continues to report pain as primarily located in her lower lumbosacral region with radiation into her buttocks, right worse than left, and wrapping around her lateral hips. She reports additional falls since last visit. She reports worsening neck pain with radiation into her left arm down to her hand with cramping and burning.  She also reports worsening symptoms down both legs.  interfering with her ability to walk at times due to significant weakness. She reports repeated falls due to her legs giving out from underneath her unexpectedly.       Interval History (6/9/2023):  Shireen Felix presents today via telemed for follow-up visit.  Patient was last seen on 05/16/2023. Last injection bilateral L4/5 TFESI on 4/26/23 with 80% relief. Patient reports pain as 10/10 today. She reports pain that began over the past several weeks, became severe 2 days ago. She reports pain is primarily located in her lower lumbosacral region with radiation into her buttocks, right worse than left, and wrapping around her lateral hips. Pain is similar to that prior to SIJ and GT bursa injection, last SIJ injection 11/16/2021 with excellent relief until a few weeks ago when pain returned. She reports in the past she has also received trigger point injections in her lower back that were very helpful.    Interval History (5/16/2023): Shireen Felix presents today for follow-up visit.  she underwent bilateral L4/5 TFESI on 4/26/23.  The patient reports that she is/was better following the procedure.  she reports 80% pain relief.  The changes lasted 4 weeks so far.  The changes have continued through this visit.  Patient reports pain as 7/10 today. She reports the procedure itself was very painful. Reports she did not  feel sedated at all compared to previous procedures with other providers. She is not interested in pursuing additional procedures secondary to the painful procedure.  She reports worsening neck pain with radiation into her right arm down to her hand with cramping and burning.    Interval History (3/9/2023):  Shireen Felix presents today for follow-up visit for CT and EMG review.  Patient was last seen on 2/1/2023. At that visit, the plan was to order updated CT of her cervical and lumbar spine as well as EMG of her bilateral upper extremities. Patient reports pain as 7/10 today.  She reports worsening neck pain with radiation into her left arm down to her hand with cramping and burning.  She also reports worsening symptoms down both legs.  interfering with her ability to walk at times due to significant weakness. Has an appointment with Dr. Navarro on 03/21/2023. Patient fell hit head on concrete 2-11-23, and had a seizure shortly after.  She reports repeated falls due to her legs giving out from underneath her unexpectedly. Referred to Neurology.  She is also currently on antibiotics secondary to having 10 teeth pulled     Interval History (1/31/2023):  Shireen Felix presents today for follow-up visit.  Patient was last seen on 10/4/2022. At that visit, the plan was to schedule bilateral L3-5 MBB followed 2 weeks later by XANDER.  She canceled these procedures and reports at this time she is not interested in scheduling.  She reports she has a  and goes to the gym 3-4 times per week which includes walking on a treadmill.   Patient reports pain as 7/10 today. She reports worsening neck pain with radiation into her left arm down to her hand with cramping and burning. This has been severe over the past week. Last cervical CT in 2019, she reports she has sustained multiple falls since then. She also reports worsening symptoms down her left leg, interfering with her ability to walk at times due  to significant weakness.      Interval history 10/04/2022  Ms. Felix is a 76-year-old female with past medical history significant for cerebral aneurysm status post craniotomy and repair, cerebral vascular accident, left V1 distribution post herpetic neuralgia, depression, glaucoma, asthma/COPD, coronary artery disease, GERD, nicotine dependence, type 2 diabetes who presents to [facility] care, previous Dr. Dias and Dr. Fulton patient.  Today patient reports pain in the lower back and legs.  Pain is constant today is rated 7/10.  Patient reports pain in a bandlike distribution in the lower back which radiates down the posterior aspects of bilateral lower extremities in L5-S1 distribution to mid thigh.  Pain is described as burning and aching in nature.  Pain is exacerbated when moving from sitting to standing and standing and ambulating just a few feet.  Patient does report associated weakness in the lower extremities associated with her pain.  Patient reports she is able to ambulate with assistive device, walker only a few feet before requiring rest.  Pain has been improved with prior procedures, including medial branch block and transforaminal epidural steroid injection.  Patient is interested in pursuing repeat injection.  Of note patient has trialed medicinal marijuana with Dr. Mckeon and reports palpitations side effect.  Patient has discontinued tramadol, tizanidine and gabapentin.  Pain is improved with prednisone which she takes prescribed by her pulmonologist.    Interval history:  Dr. Fulton:  05/10/2021-05/06/2022  Shireen Felix is a 76 y.o. female  who is presenting with a chief complaint of Neck Pain. The patient began experiencing this problem insidiously, and the pain has been gradually worsening over the past 6 month(s). The pain is described as throbbing, cramping, aching and heavy and is located in the bilateral cervical spine. Pain is intermittent and lasts hours. The  pain is nonradiating.  The patient rates her pain a 7 out of ten and interferes with activities of daily living a 7 out of ten. Pain is exacerbated by rotation of the cervical spine, and is improved by rest. Patient reports no prior trauma, no prior spinal surgery      This patient is a 74 y.o. female who presents today complaining of the above noted pain/s. The patient describes the pain as follows.  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  She reports approximately 2 years ago she had to sudden deaths in the family which caused very stressful time for her and resulted in shingles rash in the left V1 distribution however she reports having excruciating pain in the left V1 and V2 distributions.  She denies having difficulty with eating food and brushing her teeth on the left side of her face however the left lateral side of her nose gets her excruciating pain.  She has been tried on numerous medications in the past including gabapentin, Lyrica, Valtrex, Celebrex, ibuprofen which have all provided minimal benefit however steroids have been most helpful.  Today she rates her pain as an 4/10 and describes a constant burning sensation the left side of her face in addition to radiation into her bilateral lower extremities, her right shoulder, her left upper extremity occasionally as a pins and needle sensation.  She does report having numbness and weakness in her left lower extremity.  She has been physical therapy which she completed in February of 2019 however this caused most of her low back and leg symptoms to worsen in addition to her post herpetic neuralgia to worsen.  She denies having bowel bladder difficulties.  Her symptoms are worse with activity such as walking in her somewhat improved with rest however she had finds it difficult to get into a comfortable position. She has been using topical  lidocaine patches and applying to the left side of her face which does provide significant benefit.  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     Shireen Felix is a 76 y.o. female  who is presenting with a chief complaint of lumbar spine. The patient began experiencing this problem insidiously, and the pain has been gradually worsening over the past 2 year(s). The pain is described as throbbing, shooting, burning, aching and electrical and is located in the bilateral lumbar spine. Pain is intermittent and lasts hours. The pain radiates to bilateral lower extremities. The patient rates her pain a 7 out of ten and interferes with activities of daily living a 6 out of ten. Pain is exacerbated by flexion of the lumbar spine, and is improved by rest. Patient reports no prior trauma, no prior spinal surgery     Interval HPI 06/03/2020: Dr. Dias:  Ms. Felix returns to clinic for follow-up.  She underwent a T12-L1 transforaminal epidural steroid injection on March 10, 2020 and she reports that this has provided significant pain relief for her which radiated into her right leg prior to the injection.  She does report that his symptoms have returned and the injections worn off her pain is currently rated 7/10.  She was scheduled to undergo bilateral lumbar radiofrequency ablation prior to corona virus however she would like to hold off on that at this time and pursue repeat injection in the low back.  Unfortunately she also reports that she had her wheelchair fall over a few weeks ago and this has resulted in bilateral shoulder pain.  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  She finds that rest does provide some pain relief on activity causes her symptoms to worsen.  She has  been able to walk significantly more after her most recent injection in March reported she is able to walk several steps before having to sit rest however this has a vast improvement from prior to the injection.    Interval HPI:  02/12/2020; Dr. Dias  Ms. Felix returns to clinic for follow-up.  She underwent bilateral lumbar medial branch blocks on January 31, 2020 and reports 100% symptomatic pain relief for approximately 1 week and his symptoms slowly began to return.  She continues to have some right-sided lower thoracic posterior pain which was not completely dressed with the medial branch blocks.  Today she rates her pain as an 8/10 which is located primarily in the axial lumbar spine and the lower right posterior thoracic region.  She denies having numbness weakness in the legs but does continue to have a constant aching and throbbing sensation in the low back.    Interval HPI 01/23/2020: Dr. Dias: Ms. Felix returns to clinic for follow-up.  She reports that she underwent bilateral L4/5 transforaminal epidural steroid injections in November 2019 reports ongoing 80% symptomatic pain relief.  She has also been participating physical therapy which she has found to be very helpful however 4 days ago she started to do some leg raise is in her bed and she felt severe pain in her low back which has not subsided.  She denies having any radiation except for around her flank into her anterior abdomen.  She feels like the pain sometimes comes straight through from her back to her abdomen.  Today she rates her pain as a 10/10 describes it as a constant aching and sharp pain. She has been using a heating pad which is minimally helpful and she has been holding off on physical therapy at this time. She denies having bowel bladder difficulty.     Interval History: Patient was seen on 8/4/19. At that time she underwent bilateral L3/4 TF XANDER.  The patient reports that she is/was better following the procedure.  she reports  75% pain relief.  She had a fall, then pain increased. She is currently living at [facility]. She is doing at home therapy there, which is helping. She is in wheelchair now but hopes to transition to walker soon.  After the fall, she was not able to get out of the bed.      Initial History of Present Illness:  Dr. Dias  This patient is a 74 y.o. female who presents today complaining of the above noted pain/s. The patient describes the pain as follows.  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  She reports approximately 2 years ago she had to sudden deaths in the family which caused very stressful time for her and resulted in shingles rash in the left V1 distribution however she reports having excruciating pain in the left V1 and V2 distributions.  She denies having difficulty with eating food and brushing her teeth on the left side of her face however the left lateral side of her nose gets her excruciating pain.  She has been tried on numerous medications in the past including gabapentin, Lyrica, Valtrex, Celebrex, ibuprofen which have all provided minimal benefit however steroids have been most helpful.  Today she rates her pain as an 8/10 and describes a constant burning sensation the left side of her face in addition to radiation into her bilateral lower extremities, her right shoulder, her left upper extremity occasionally as a pins and needle sensation.  She does report having numbness and weakness in her left lower extremity.  She has been physical therapy which she completed in February of 2019 however this caused most of her low back and leg symptoms to worsen in addition to her post herpetic neuralgia to worsen.  She denies having bowel bladder difficulties.  Her symptoms are worse with activity such as walking in her somewhat improved with rest however she had finds it  difficult to get into a comfortable position. She has been using topical lidocaine patches and applying to the left side of her face which does provide significant benefit.  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    - pertinent negatives: No fever, No chills, No weight loss, No bladder dysfunction, No bowel dysfunction, No saddle anesthesia  - pertinent positives: generalized nonspecific Lower Extremity weakness bilaterally    - medications, other therapies tried (physical therapy, injections):     >> NSAIDs, Tylenol, Tramadol, Norco, gabapentin, lyrica, flexeril and medrol dose pack    >> Has previously undergone Physical Therapy      Pain injections:    Dr. Oshea:  -07/28/2023: Bilateral sacroiliac joint and greater trochanteric bursa injection  -04/26/2023: bilateral L4/5 TFESI with 80% relief      -11/16/2021: Right-sided SI joint and greater trochanteric bursa injection; Dr. Fulton  -07/09/2020: Bilateral glenohumeral joint injection; Dr. Dias  -06/19/2020: Right-sided T12/L1 transforaminal epidural steroid injection; Dr. Dias  -03/10/2020:  T12/L1 transforaminal epidural steroid injection; Dr. Dias  -01/31/2020: Bilateral L3-5 medial branch blocks; Dr. Dias  -11/12/2019: Bilateral L4/5 transforaminal epidural steroid injection; Dr. Dias      Imaging / Labs / Studies (reviewed on 10/11/2023):    CT Cervical spine 06/23/2023  FINDINGS:  There is unchanged grade 1 anterolisthesis of C3 on C4 and C4 on C5.  There is no new spondylolisthesis.  There is no loss of vertebral body height.  There is multilevel degenerative disc space narrowing most significant at C5-C6 and C6-C7.  There is unchanged diffuse degenerative facet arthropathy with posterior disc osteophyte complexes and uncovertebral osteophytosis resulting in varying degrees of canal and foraminal stenosis.  This  is incompletely evaluated on this exam.     The included portions of the posterior fossa are normal.  The craniocervical junction is symmetric.  The atlantoaxial articulation is normal.  The airway is widely patent.  The thyroid gland is normal.  There is no cervical adenopathy.  The visualized lung apices are clear.     Impression:     Multilevel degenerative spondylolisthesis and spondylosis resulting in varying degrees of canal and foraminal stenosis, grossly unchanged from prior exam.    CT lumbar spine 06/23/2023  FINDINGS:  Five non-rib-bearing lumbar type vertebral bodies are present.  There is mild Levoscoliotic curvature of the lumbar spine centered at L3, similar compared to the prior.     With regards to alignment, there is minimal right lateral listhesis of L1 on L2 and L2 on L3.  There is minimal left lateral listhesis of L3 on L4.  No anterolisthesis or retrolisthesis.     There are chronic compression deformities through the superior endplate of T12, inferior endplate of L1 and superior endplate of L2.  Degree of vertebral body height loss is stable since 02/07/2023.  Vertebral body heights from L3-L5 are preserved.     Mild to severe degenerative disc changes are present throughout the lumbar spine, worst at the right half of L3-L4 and L4-L5.  Partially calcified disc bulges are seen at L2-L3, L3-L4, L4-L5 and L5-S1.  Evaluation for central canal narrowing is limited without intrathecal contrast.  Degrees of central canal stenosis appear worst at L3-L4 and L4-L5.     Hypertrophic facet changes are present throughout the lumbar spine, worst at L3-L4 and L4-L5.     Paraspinal soft tissues appear within normal limits.  Both SI joints appear intact with mild degenerative changes, similar compared to the prior.     Impression:     1. Multilevel degenerative disc and hypertrophic facet changes similar compared to 02/07/2023.  2. Chronic appearing compression deformities at T12, L1 and L2, similar compared  to 02/07/2023.  Dense of acute injury.      01/23/20    X-Ray Lumbar Spine Ap And Lateral  FINDINGS:  Levoscoliosis present.  Vertebral body heights stable.  Alignment unchanged without spondylolisthesis.  Similar appearing multilevel degenerative disc height loss and osteophyte findings noted.  Multilevel facet degenerative findings remain.  Aorta iliac atherosclerotic calcification.  Colonic constipation findings present.    6/04/19    X-Ray Cervical Spine AP And Lateral    FINDINGS:  Reversal normal C-spine curvature noted on the lateral view with visualization to the C7-T1 level.  Minimal anterior subluxation C4 on C5 noted.  There is multilevel marginal spurring and varying degrees of loss of disc height throughout the C-spine.  No acute fracture identified in prevertebral soft tissues, C1-2 articulation and odontoid appear within normal limits allowing for positioning.  Minimal vascular calcification identified on the left in the area of the carotid vessels.    EMG 02/24/2023  IMPRESSION  1. ABNORMAL study  2. 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  There is an acute on chronic radiculopathy of the LEFT C6 nerve root and a subacute on chronic of the RIGHT C4 and C5 nerve roots and a CHRONIC radiculopathy of bilateral C4-T1 nerve roots    Review of Systems:  CONSTITUTIONAL: patient denies any fever, chills, or weight loss  SKIN: patient denies any rash or itching  RESPIRATORY: patient denies having any shortness of breath  GASTROINTESTINAL: patient denies having any diarrhea, constipation, or bowel incontinence  GENITOURINARY: patient denies having any abnormal bladder function    MUSCULOSKELETAL:  - patient complains of the above noted pain/s (see  "chief pain complaint)    NEUROLOGICAL:   - pain as above  - strength in Upper and Lower extremities is decreased, BILATERALLY  - sensation in Upper extremities is intact, BILATERALLY  - patient denies any loss of bowel or bladder control      PSYCHIATRIC: patient denies any change in mood    Other:  All other systems reviewed and are negative      Physical Exam:  /65   Pulse 81   Resp 18   Ht 5' 8" (1.727 m)   Wt 49 kg (108 lb 0.4 oz)   LMP  (LMP Unknown)   BMI 16.43 kg/m²  (reviewed on 10/11/2023)  Physical Exam    GENERAL: Well appearing, in no acute distress, alert and oriented x3.  PSYCH:  Mood and affect appropriate.  SKIN: Skin color, texture, turgor normal, no rashes or lesions.  HEAD/FACE:  Normocephalic, bruising along right lower jaw, Cranial nerves grossly intact.    CV: RRR with palpation of the radial artery.  PULM: No evidence of respiratory difficulty, symmetric chest rise.  GI:  Soft and non-tender.  Neck: TTP along cervical facet joints, positive facet loading, spurlings positive bilaterally, pain with flexion, extension, and rotation - right greater than left  BACK: Straight leg raising in the sitting and supine positions is negative to radicular pain. pain to palpation over the facet joints of the lumbar spine or spinous processes.  Reduced range of motion with pain reproduction.  Tenderness to palpation along coccyx.   EXTREMITIES:  Peripheral joint range of motion is reduced with pain with obvious instability in bilateral lower extremities.  No deformities, edema, or skin discoloration. Good capillary refill.  SIJ testing:  - TTP over SI joint: Present  - Madhavi's/ Hank's: Positive    - Sacroiliac Distraction Test (anterior pressure): Positive  - Sacroiliac Compression Test (lateral pressure): Positive   - SacralThrust Test (posterior pressure): Positive  -TTP along bilateral GT bursa   MUSCULOSKELETAL:  Unable to stand on heels and toes  hip, and knee provocative maneuvers are " negative.  There is no pain with palpation over the sacroiliac joints bilaterally.  Gaenslen's, Distraction/Compression and  FABERs test is negative.  Facet loading test is positive bilaterally.   Bilateral upper and lower extremity strength is normal and symmetric.  No atrophy or tone abnormalities are noted.    RIGHT Lower extremity: Hip flexion 5/5, Hip Abduction 5/5, Hip Adduction 5/5, Knee extension 5/5, Knee flexion 5/5, Ankle dorsiflexion5/5, Extensor hallucis longus 5/5, Ankle plantarflexion 5/5  LEFT Lower extremity:  Hip flexion 5/5, Hip Abduction 5/5,Hip Adduction 5/5, Knee extension 5/5, Knee flexion 5/5, Ankle dorsiflexion 5/5, Extensor hallucis longus 5/5, Ankle plantarflexion 5/5  -Normal testing knee (patellar) jerk and ankle (achilles) jerk    NEURO: Bilateral upper and lower extremity coordination and muscle stretch reflexes are physiologic and symmetric. No loss of sensation is noted.  GAIT:  Patient presents in wheelchair today.  Antalgic gait.      Assessment:    Shireen Felix is a 77 y.o. year old female who is presenting with   Encounter Diagnoses   Name Primary?    Sacroiliitis Yes    Greater trochanteric bursitis, unspecified laterality     Coccydynia     Cervical spondylosis              Plan:    1. Interventional:  Schedule for sacrococcygeal ligament injection to see if this helps with coccydynia.  We discussed the procedure, benefits, potential risk and alternative options in detail.  Patient has elected to pursue this procedure.    Anticoagulation:  Yes, Plavix  Will obtain cardiac clearance from Dr. Dye to pause Plavix 5 days prior to sacrococcygeal ligament injection.      2. Pharmacologic:     - Procedure note: An IM injection of (ketolorac 30mg/1mL) was administered during clinic visit.  This was well tolerated.    -Continue Gabapentin 100 mg BID. We have reviewed potential side effects of this medication including daytime somnolence, weight gain and peripheral  edema    -Continue tizanidine 4-8 mg prn pain.We have discussed potential deleterious side effects associated with this medication including  dizziness, drowsiness, dry mouth or tingling sensation in the upper or lower extremities.     -Continue Nabumetone 500 mg BID for inflammation and pain. We have discussed potential deleterious side effects of NSAIDs on the cardiovascular, gastrointestinal and renal systems. We have discussed judicious use of this medication. Pt expresses understanding.       3. Rehabilitative:   -We discussed initiating physical therapy to help manage the patient/s painful condition. The patient was counseled that muscle strengthening will improve the long term prognosis in regards to pain and may also help increase range of motion and mobility. They were told that one of the goals of physical therapy is that they learn how to do the exercises so that they can do them independently at home daily upon completion. The patient's questions were answered and they were agreeable to this course. A referral for physical therapy: [facility] ([facility], [place]; dry needling+ soft tissue massage requested) was provided to the patient.      4. Diagnostic:  X-ray cervical spine and sacrum/coccyx to better evaluate pain    5. Follow up: 4 weeks after injection    Francisco Oshea MD      The above plan and management options were discussed at length with patient. Patient is in agreement with the above and verbalized understanding.    - I discussed the goals of interventional chronic pain management with the patient on today's visit. We discussed a multimodal and systematic approach to pain.  This includes diagnostic and therapeutic injections, adjuvant pharmacologic treatment, physical therapy, and at times psychiatry.  I emphasized the importance of regular exercise, core strengthening and stretching, diet and weight loss as a cornerstone of long-term pain management.    - This condition does not require this patient to  take time off of work, and the primary goal of our Pain Management services is to improve the patient's functional capacity.  - Patient Questions: Answered all of the patient's questions regarding diagnoses, therapy, treatment and next steps    Disclaimer:  This note may have been prepared using voice recognition software, it may have not been extensively proofed, as such there could be errors within the text such as sound alike errors.

## 2023-10-12 ENCOUNTER — PATIENT MESSAGE (OUTPATIENT)
Dept: PAIN MEDICINE | Facility: CLINIC | Age: 78
End: 2023-10-12
Payer: MEDICARE

## 2023-10-12 ENCOUNTER — TELEPHONE (OUTPATIENT)
Dept: PAIN MEDICINE | Facility: CLINIC | Age: 78
End: 2023-10-12
Payer: MEDICARE

## 2023-10-12 NOTE — TELEPHONE ENCOUNTER
----- Message from Jarrell Dye MD sent at 10/11/2023  9:54 AM CDT -----  Regarding: RE: Cardiac Clearance  HI    Cardiac wise -  yes she can hold Plavix 5 days prior and resume post op. Thanks    - Dr. Dye  ----- Message -----  From: Keke Alves MA  Sent: 10/11/2023   9:47 AM CDT  To: Jarrell Dye MD  Subject: Cardiac Clearance                                Dr. Dye:    Normanrosa Veronicarick Felix was seen in office with complaints of severe low back pain. Dr. Oshea would like to perform coccyx injection, and Shireen Felix would like to proceed. Dr. Oshea is requesting for clearance to hold clopidogrel (Plavix) 5 days prior to procedure. Patient Shireen Felix can resume medication following the procedure. Please let us know if it is ok to proceed with procedure.    .Keke Alves [facility]

## 2023-10-12 NOTE — TELEPHONE ENCOUNTER
Cardiac clearance pt cleared to hold for Plavix 5 days.  Pt procedure scheduled for Nov 3 with Dr. Oshea Procedure Instructions sent to pt MyChart.  .Keke Alves [facility]

## 2023-10-16 ENCOUNTER — TELEPHONE (OUTPATIENT)
Dept: CARDIOLOGY | Facility: CLINIC | Age: 78
End: 2023-10-16
Payer: MEDICARE

## 2023-10-16 ENCOUNTER — TELEPHONE (OUTPATIENT)
Dept: HEMATOLOGY/ONCOLOGY | Facility: CLINIC | Age: 78
End: 2023-10-16
Payer: MEDICARE

## 2023-10-16 ENCOUNTER — HOSPITAL ENCOUNTER (OUTPATIENT)
Dept: RADIOLOGY | Facility: HOSPITAL | Age: 78
Discharge: HOME | End: 2023-10-16
Attending: PEDIATRICS
Payer: MEDICARE

## 2023-10-16 DIAGNOSIS — Z12.31 ENCOUNTER FOR SCREENING MAMMOGRAM FOR BREAST CANCER: ICD-10-CM

## 2023-10-16 PROCEDURE — 77063 BREAST TOMOSYNTHESIS BI: CPT | Mod: 26,,, | Performed by: RADIOLOGY

## 2023-10-16 PROCEDURE — 77067 MAMMO DIGITAL SCREENING BILAT WITH TOMO: ICD-10-PCS | Mod: 26,,, | Performed by: RADIOLOGY

## 2023-10-16 PROCEDURE — 77067 SCR MAMMO BI INCL CAD: CPT | Mod: TC

## 2023-10-16 PROCEDURE — 77063 MAMMO DIGITAL SCREENING BILAT WITH TOMO: ICD-10-PCS | Mod: 26,,, | Performed by: RADIOLOGY

## 2023-10-16 PROCEDURE — 77067 SCR MAMMO BI INCL CAD: CPT | Mod: 26,,, | Performed by: RADIOLOGY

## 2023-10-16 NOTE — TELEPHONE ENCOUNTER
----- Message from Ruslan Devine sent at 10/16/2023  4:30 PM CDT -----  Contact: serjio/brother  Pt brother is calling to get appt rescheduled to 10/18 due to transportation. Please call back at [phone]                                Thanks  PARTHA

## 2023-10-16 NOTE — TELEPHONE ENCOUNTER
----- Message from Ruslan Devine sent at 10/16/2023  4:31 PM CDT -----  Contact: Rm-brother  Pt brother is calling in regards to getting appt on 10/19 rescheduled to 10/18. Please call back at [phone]                    Thanks  SW

## 2023-10-18 ENCOUNTER — TELEPHONE (OUTPATIENT)
Dept: HEMATOLOGY/ONCOLOGY | Facility: CLINIC | Age: 78
End: 2023-10-18
Payer: MEDICARE

## 2023-10-18 NOTE — TELEPHONE ENCOUNTER
Spoke with patient's brother concerning lab work. Had Pham GORDON review labs due to patient feeling dizzy, answered all of his questions. Expressed patient has appointment with Ember Veras NP tomorrow.

## 2023-10-18 NOTE — TELEPHONE ENCOUNTER
----- Message from Mago Irizarry sent at 10/18/2023 11:38 AM CDT -----  Patient is requesting a call back concerning her lab results and also needs to speak with the nurse about something else. Call back at [phone]

## 2023-10-19 ENCOUNTER — OFFICE VISIT (OUTPATIENT)
Dept: HEMATOLOGY/ONCOLOGY | Facility: CLINIC | Age: 78
End: 2023-10-19
Payer: MEDICARE

## 2023-10-19 ENCOUNTER — OFFICE VISIT (OUTPATIENT)
Dept: CARDIOLOGY | Facility: CLINIC | Age: 78
End: 2023-10-19
Payer: MEDICARE

## 2023-10-19 VITALS
TEMPERATURE: 98 F | HEART RATE: 83 BPM | DIASTOLIC BLOOD PRESSURE: 70 MMHG | BODY MASS INDEX: 18.18 KG/M2 | SYSTOLIC BLOOD PRESSURE: 122 MMHG | OXYGEN SATURATION: 100 % | WEIGHT: 106.5 LBS | HEIGHT: 64 IN

## 2023-10-19 VITALS
HEART RATE: 84 BPM | DIASTOLIC BLOOD PRESSURE: 62 MMHG | OXYGEN SATURATION: 99 % | HEIGHT: 68 IN | SYSTOLIC BLOOD PRESSURE: 140 MMHG | WEIGHT: 107.81 LBS | BODY MASS INDEX: 16.34 KG/M2

## 2023-10-19 DIAGNOSIS — D50.8 OTHER IRON DEFICIENCY ANEMIA: ICD-10-CM

## 2023-10-19 DIAGNOSIS — D64.9 ANEMIA, UNSPECIFIED TYPE: Primary | ICD-10-CM

## 2023-10-19 DIAGNOSIS — I10 PRIMARY HYPERTENSION: Primary | ICD-10-CM

## 2023-10-19 PROCEDURE — 99214 PR OFFICE/OUTPT VISIT, EST, LEVL IV, 30-39 MIN: ICD-10-PCS | Mod: S$GLB,,, | Performed by: NURSE PRACTITIONER

## 2023-10-19 PROCEDURE — 99999 PR PBB SHADOW E&M-EST. PATIENT-LVL V: ICD-10-PCS | Mod: PBBFAC,,, | Performed by: INTERNAL MEDICINE

## 2023-10-19 PROCEDURE — 99214 OFFICE O/P EST MOD 30 MIN: CPT | Mod: S$GLB,,, | Performed by: NURSE PRACTITIONER

## 2023-10-19 PROCEDURE — 3078F PR MOST RECENT DIASTOLIC BLOOD PRESSURE < 80 MM HG: ICD-10-PCS | Mod: CPTII,S$GLB,, | Performed by: INTERNAL MEDICINE

## 2023-10-19 PROCEDURE — 99999 PR PBB SHADOW E&M-EST. PATIENT-LVL V: ICD-10-PCS | Mod: PBBFAC,,, | Performed by: NURSE PRACTITIONER

## 2023-10-19 PROCEDURE — 3074F SYST BP LT 130 MM HG: CPT | Mod: CPTII,S$GLB,, | Performed by: NURSE PRACTITIONER

## 2023-10-19 PROCEDURE — 3078F PR MOST RECENT DIASTOLIC BLOOD PRESSURE < 80 MM HG: ICD-10-PCS | Mod: CPTII,S$GLB,, | Performed by: NURSE PRACTITIONER

## 2023-10-19 PROCEDURE — 1159F MED LIST DOCD IN RCRD: CPT | Mod: CPTII,S$GLB,, | Performed by: NURSE PRACTITIONER

## 2023-10-19 PROCEDURE — 1100F PR PT FALLS ASSESS DOC 2+ FALLS/FALL W/INJURY/YR: ICD-10-PCS | Mod: CPTII,S$GLB,, | Performed by: NURSE PRACTITIONER

## 2023-10-19 PROCEDURE — 1160F PR REVIEW ALL MEDS BY PRESCRIBER/CLIN PHARMACIST DOCUMENTED: ICD-10-PCS | Mod: CPTII,S$GLB,, | Performed by: NURSE PRACTITIONER

## 2023-10-19 PROCEDURE — 1160F RVW MEDS BY RX/DR IN RCRD: CPT | Mod: CPTII,S$GLB,, | Performed by: INTERNAL MEDICINE

## 2023-10-19 PROCEDURE — 1159F MED LIST DOCD IN RCRD: CPT | Mod: CPTII,S$GLB,, | Performed by: INTERNAL MEDICINE

## 2023-10-19 PROCEDURE — 3077F SYST BP >= 140 MM HG: CPT | Mod: CPTII,S$GLB,, | Performed by: INTERNAL MEDICINE

## 2023-10-19 PROCEDURE — 3078F DIAST BP <80 MM HG: CPT | Mod: CPTII,S$GLB,, | Performed by: NURSE PRACTITIONER

## 2023-10-19 PROCEDURE — 1159F PR MEDICATION LIST DOCUMENTED IN MEDICAL RECORD: ICD-10-PCS | Mod: CPTII,S$GLB,, | Performed by: INTERNAL MEDICINE

## 2023-10-19 PROCEDURE — 1160F RVW MEDS BY RX/DR IN RCRD: CPT | Mod: CPTII,S$GLB,, | Performed by: NURSE PRACTITIONER

## 2023-10-19 PROCEDURE — 3078F DIAST BP <80 MM HG: CPT | Mod: CPTII,S$GLB,, | Performed by: INTERNAL MEDICINE

## 2023-10-19 PROCEDURE — 3288F FALL RISK ASSESSMENT DOCD: CPT | Mod: CPTII,S$GLB,, | Performed by: NURSE PRACTITIONER

## 2023-10-19 PROCEDURE — 1125F AMNT PAIN NOTED PAIN PRSNT: CPT | Mod: CPTII,S$GLB,, | Performed by: NURSE PRACTITIONER

## 2023-10-19 PROCEDURE — 1100F PTFALLS ASSESS-DOCD GE2>/YR: CPT | Mod: CPTII,S$GLB,, | Performed by: NURSE PRACTITIONER

## 2023-10-19 PROCEDURE — 1160F PR REVIEW ALL MEDS BY PRESCRIBER/CLIN PHARMACIST DOCUMENTED: ICD-10-PCS | Mod: CPTII,S$GLB,, | Performed by: INTERNAL MEDICINE

## 2023-10-19 PROCEDURE — 3074F PR MOST RECENT SYSTOLIC BLOOD PRESSURE < 130 MM HG: ICD-10-PCS | Mod: CPTII,S$GLB,, | Performed by: NURSE PRACTITIONER

## 2023-10-19 PROCEDURE — 99999 PR PBB SHADOW E&M-EST. PATIENT-LVL V: CPT | Mod: PBBFAC,,, | Performed by: INTERNAL MEDICINE

## 2023-10-19 PROCEDURE — 99214 OFFICE O/P EST MOD 30 MIN: CPT | Mod: S$GLB,,, | Performed by: INTERNAL MEDICINE

## 2023-10-19 PROCEDURE — 1159F PR MEDICATION LIST DOCUMENTED IN MEDICAL RECORD: ICD-10-PCS | Mod: CPTII,S$GLB,, | Performed by: NURSE PRACTITIONER

## 2023-10-19 PROCEDURE — 1125F PR PAIN SEVERITY QUANTIFIED, PAIN PRESENT: ICD-10-PCS | Mod: CPTII,S$GLB,, | Performed by: NURSE PRACTITIONER

## 2023-10-19 PROCEDURE — 3077F PR MOST RECENT SYSTOLIC BLOOD PRESSURE >= 140 MM HG: ICD-10-PCS | Mod: CPTII,S$GLB,, | Performed by: INTERNAL MEDICINE

## 2023-10-19 PROCEDURE — 3288F PR FALLS RISK ASSESSMENT DOCUMENTED: ICD-10-PCS | Mod: CPTII,S$GLB,, | Performed by: NURSE PRACTITIONER

## 2023-10-19 PROCEDURE — 99214 PR OFFICE/OUTPT VISIT, EST, LEVL IV, 30-39 MIN: ICD-10-PCS | Mod: S$GLB,,, | Performed by: INTERNAL MEDICINE

## 2023-10-19 PROCEDURE — 99999 PR PBB SHADOW E&M-EST. PATIENT-LVL V: CPT | Mod: PBBFAC,,, | Performed by: NURSE PRACTITIONER

## 2023-10-19 RX ORDER — CARVEDILOL 6.25 MG/1
6.25 TABLET ORAL 2 TIMES DAILY WITH MEALS
Qty: 180 TABLET | Refills: 1 | Status: SHIPPED | OUTPATIENT
Start: 2023-10-19 | End: 2024-04-16

## 2023-10-19 NOTE — TELEPHONE ENCOUNTER
No care due was identified.  [facility] Embedded Care Due Messages. Reference number: [id number].   10/19/2023 10:24:23 AM CDT

## 2023-10-19 NOTE — PROGRESS NOTES
Subjective:   Patient ID:  Shireen Felix is a 77 y.o. female who presents for cardiac consult of No chief complaint on file.      The patient came in today for cardiac consult of No chief complaint on file.      Shireen Felix is a 77 y.o. female pt with HFPEF, CAD, CVI, prior CVA, COPD, seizure disorder, DM type II, HTN, and ANDREI here for follow up CV eval.     9/12/19  She had a fall last month was admitted to [facility] and had cardiac eval for CP with min elevated trop 1st set, ECHO normal, pain thought due to GI etiology.   She has been doing a lot of strenous activity and has been getting more SOB and CP with it. CP feels like pressure, worse with being more tired.   Had recent pulm eval had nodule, had overall stable lung capacity.   Prior CV - Dr. Garcias    5/17/23  Follow up since 6/2022. She had falls lately and had XANDER with Dr. Oshea. BP has been elevated 189/121.     ER eval  Medical Decision Making  77-year-old female presented to ER with concerns for hypertension. Reports has been taking her medication as directed however she has not mentioned to her PCP that her blood pressures been elevated. Peak pressure yesterday 200/100. Today's peak pressure was 190/80 per patient. She denies any symptoms concerning her blood pressure especially no nausea, vomiting, fever, headache, chest pain, shortness of breath, weakness, dizziness, blurry vision. Advised patient that her sodium level was a little low and she should speak to her PCP concerning this. Advised her that her BNP was little elevated she should discuss this with her PCP.     Ref Range & Units 4 d ago   BNP(Brain Natriuretic Peptide) 15 - 266 PG/ High       She has been taking HCTZ daily for last few months. She has been having more SOB as well. She had COVID 6 times. She has hyponatremia on recent labs at [facility], was told to increase her sodium but she also has increased BNP concern for worsening CHF.. She has ongoing dizziness, no recent ENT or  Neuro eval.    6/30/23  Pt presents today for BP follow up. She was started in 25mg coreg 2 days ago. Carotid and renal stenosis ultrasound normal. Home BP readings 140s/80s after medication. Pt compliant with medications. She also reports her heart rate jumping and feeling palpitations in the afternoons. Reports occasional angina episodes     9/15/23  Here today for CV follow up, recent admission with HARESH and salmonella. Pt reports since DC she has felt fatigue and trouble with PO intake but states better than when hospitalized. ARB, amlodipine and HCTZ were stopped. Coreg was decreased to 6.25mg BID. BP today 100s/50s, seeing HH. Denies any CP at this time.        10/19/23  BP and Hr stable. BMI 16 - 107 lbs. Pt has seen Indira since last visit.   She had low BPs had Salmonella   Nuclear stress neg 6/2023. ECHO 6/2/2023 with normal bi V function.       Patient is compliant with medications.    ECG - sinus tach V rate 124    Results for orders placed during the hospital encounter of 06/02/23    Echo    Interpretation Summary  · The left ventricle is normal in size with concentric hypertrophy and low normal systolic function.  · The estimated ejection fraction is 50%.  · Normal left ventricular diastolic function.  · There is abnormal septal wall motion consistent with left bundle branch block.  · Normal right ventricular size with normal right ventricular systolic function.  · Normal central venous pressure (3 mmHg).  · The estimated PA systolic pressure is 30 mmHg.  · Mild mitral regurgitation.    Results for orders placed during the hospital encounter of 06/02/23    Nuclear Stress - Cardiology Interpreted    Interpretation Summary    Normal myocardial perfusion scan. There is no evidence of myocardial ischemia or infarction.    The gated perfusion images showed an ejection fraction of 54% at rest. The gated perfusion images showed an ejection fraction of 62% post stress.    There is normal wall motion at rest  and post stress.    LV cavity size is normal at rest and normal at stress.    The ECG portion of the study is negative for ischemia.    The patient reported no chest pain during the stress test.      HOLTER  Sinus rhythm with heart rates varying between 75 and 136 BPM with an average of 95 BPM.     Maximum heart rate recorded at: 13:43 CDT on day 2.     Minimum heart rate recorded at 02:25 CDT on day 1.   PVC    Ventricular Arrhythmias  There were rare PVCs totalling 510 and averaging 5.38 per hour. There were 6 couplets. There were 9 triplets.   PAC    Supraventricular Arrhythmias  There were very rare PACs totalling 37 and averaging 0.39 per hour.             Past Medical History:   Diagnosis Date    Abnormal ECG 8/5/2019    Aneurysm     Anticoagulant long-term use     Arthritis     CAD in native artery 8/5/2019    COPD (chronic obstructive pulmonary disease)     Diabetes mellitus     Fall 10/10/2019    Formatting of this note might be different from the original. Found sitting on floor next to bed last night Mild confusion today Does not recall falling or how she ended up on floor UA today Labs yesterday unremarkable    Glaucoma     Hypertension     Seizures     Shingles 05/27/2017    Stroke        Past Surgical History:   Procedure Laterality Date    BRAIN SURGERY      CARDIAC CATHETERIZATION      COLONOSCOPY N/A 09/21/2023    Procedure: COLONOSCOPY;  Surgeon: Joanna Leal MD;  Location: [facility] ENDO;  Service: Endoscopy;  Laterality: N/A;    CORONARY ANGIOPLASTY      EPIDURAL STEROID INJECTION INTO LUMBAR SPINE Bilateral 11/12/2019    Procedure: TF XANDER L4/5;  Surgeon: Desean Dias MD;  [facility];  Service: Pain Management;  Laterality: Bilateral;    HYSTERECTOMY      INJECTION OF ANESTHETIC AGENT AROUND MEDIAL BRANCH NERVES INNERVATING LUMBAR FACET JOINT Bilateral 01/31/2020    Procedure: Bilateral L3-5 MBB;  Surgeon: Desean Dias MD;  Location: [facility] PAIN MGT;  Service: Pain  Management;  Laterality: Bilateral;    INJECTION OF ANESTHETIC AGENT INTO SACROILIAC JOINT Right 11/16/2021    Procedure: Right BLOCK, SACROILIAC JOINT Right GTB with RN IV sedation;  Surgeon: Yao Fulton MD;  Location: [facility] PAIN [facility]GT;  Service: Pain Management;  Laterality: Right;    INJECTION OF ANESTHETIC AGENT INTO SACROILIAC JOINT Bilateral 07/28/2023    Procedure: Bilateral GT bursa + bilateral SIJ injection;  Surgeon: Francisco Oshea MD;  Location: [facility] PAIN [facility]GT;  Service: Pain Management;  Laterality: Bilateral;    INJECTION OF JOINT Bilateral 07/09/2020    Procedure: Bilateral shoulder GH Joint injection with local;  Surgeon: Desean Dias MD;  Location: [facility] PAIN [facility]GT;  Service: Pain Management;  Laterality: Bilateral;    INJECTION OF JOINT Bilateral 07/28/2023    Procedure: Bilateral GT bursa + bilateral SIJ injection NEEDS ADDITIONAL SEDATION AND MORE TIME BEFORE INJECTION RN IV Sedation;  Surgeon: Francisco Oshea MD;  Location: [facility] PAIN [facility]GT;  Service: Pain Management;  Laterality: Bilateral;    OOPHORECTOMY      SELECTIVE INJECTION OF ANESTHETIC AGENT AROUND LUMBAR SPINAL NERVE ROOT BY TRANSFORAMINAL APPROACH Bilateral 04/26/2023    Procedure: Bilateral L4/5 TF XANDER RN IV Sedation;  Surgeon: Francisco Oshea MD;  [facility];  Service: Pain Management;  Laterality: Bilateral;    TRANSFORAMINAL EPIDURAL INJECTION OF STEROID Bilateral 07/23/2019    Procedure: Bilateral L3/4 Transforaminal Epidural Steroid Injection;  Surgeon: Desean Dias MD;  Location: [facility]IN [facility];  Service: Pain Management;  Laterality: Bilateral;    TRANSFORAMINAL EPIDURAL INJECTION OF STEROID Bilateral 03/10/2020    Procedure: Right T12/L1 TF XANDER with local;  Surgeon: Desean Dias MD;  Location: [facility];  Service: Pain Management;  Laterality: Bilateral;    TRANSFORAMINAL EPIDURAL INJECTION OF STEROID Right 06/19/2020    Procedure: Right T12/L1 TFESI Covid day of procedure;  Surgeon: Desean Dias MD;   Location: [facility];  Service: Pain Management;  Laterality: Right;       Social History     Tobacco Use    Smoking status: Former     Current packs/day: 0.00     Average packs/day: 1 pack/day for 42.3 years (42.3 ttl pk-yrs)     Types: Cigarettes     Start date:      Quit date: 2004     Years since quittin.5    Smokeless tobacco: Former   Substance Use Topics    Alcohol use: No    Drug use: Never       Family History   Problem Relation Age of Onset    No Known Problems Mother     No Known Problems Father     Breast cancer Maternal Aunt     Breast cancer Maternal Aunt     Breast cancer Maternal Aunt        Patient's Medications   New Prescriptions    No medications on file   Previous Medications    ALBUTEROL (PROVENTIL) 2.5 MG /3 ML (0.083 %) NEBULIZER SOLUTION    Take 2.5 mg by nebulization every 6 (six) hours as needed for Wheezing. Rescue    ALBUTEROL (PROVENTIL/VENTOLIN HFA) 90 MCG/ACTUATION INHALER    INHALE TWO PUFFS INTO THE LUNGS EVERY 4 HOURS AS NEEDED    APTIOM 400 MG TAB TABLET    Take 400 mg by mouth.    ATORVASTATIN (LIPITOR) 40 MG TABLET    TAKE 1 TABLET BY MOUTH ONCE DAILY    CARVEDILOL (COREG) 6.25 MG TABLET    Take 1 tablet (6.25 mg total) by mouth 2 (two) times daily with meals.    CHOLECALCIFEROL, VITAMIN D3, 1,250 MCG (50,000 UNIT) CAPSULE    Take 1 capsule (50,000 Units total) by mouth every 7 days.    CHOLESTYRAMINE (QUESTRAN) 4 GRAM PACKET    Take 1 packet (4 g total) by mouth 2 (two) times daily.    CLOPIDOGREL (PLAVIX) 75 MG TABLET    Take 1 tablet (75 mg total) by mouth once daily.    DALIRESP 500 MCG TAB    TAKE 1 TABLET BY MOUTH ONCE DAILY    DENOSUMAB (PROLIA) 60 MG/ML SYRG    every 6 (six) months.    DICYCLOMINE (BENTYL) 10 MG CAPSULE    Take 1 capsule (10 mg total) by mouth 2 (two) times daily.    DILTIAZEM (CARDIZEM CD) 120 MG CP24    Take 2 capsules (240 mg total) by mouth once daily.    DORZOLAMIDE-TIMOLOL 2-0.5% (COSOPT) 22.3-6.8 MG/ML OPHTHALMIC SOLUTION    Place  1 drop into both eyes every 12 (twelve) hours.    ESCITALOPRAM OXALATE (LEXAPRO) 20 MG TABLET    TAKE 1 TABLET BY MOUTH ONCE DAILY    FLUTICASONE PROPIONATE (FLONASE) 50 MCG/ACTUATION NASAL SPRAY    USE 2 SPRAYS INTO EACH NOSTRIL ONCE A DAY AT 6AM AS DIRECTED    FLUTICASONE-UMECLIDIN-VILANTER (TRELEGY ELLIPTA) 200-62.5-25 MCG INHALER    INHALE 1 PUFF BY MOUTH ONCE A DAY    FUROSEMIDE (LASIX) 20 MG TABLET    Take 20 mg by mouth daily as needed.    GABAPENTIN (NEURONTIN) 300 MG CAPSULE    Take 1 capsule (300 mg total) by mouth 3 (three) times daily.    HYDROCODONE-ACETAMINOPHEN (NORCO) 5-325 MG PER TABLET    Take 1 tablet by mouth every 6 (six) hours as needed for Pain.    IPRATROPIUM (ATROVENT) 42 MCG (0.06 %) NASAL SPRAY    USE 2 SPRAYS INTO EACH NOSTRIL FOUR TIMES DAILY    ISOSORBIDE MONONITRATE (IMDUR) 60 MG 24 HR TABLET    Take 1 tablet (60 mg total) by mouth every evening.    KETOCONAZOLE (NIZORAL) 2 % CREAM    Apply topically 2 (two) times daily. For dry flaky patches of ear.    LATANOPROST 0.005 % OPHTHALMIC SOLUTION    Place 1 drop into both eyes every evening.    LEVETIRACETAM (KEPPRA) 500 MG TAB    Take 1 tablet (500 mg total) by mouth 2 (two) times daily.    LINEZOLID (ZYVOX) 600 MG TAB    Take 1 tablet (600 mg total) by mouth every 12 (twelve) hours.    MAGNESIUM OXIDE (MAG-OX) 400 MG (241.3 MG MAGNESIUM) TABLET    Take 1 tablet (400 mg total) by mouth once daily.    MONTELUKAST (SINGULAIR) 10 MG TABLET    TAKE 1 TABLET BY MOUTH ONCE A DAY    MYRBETRIQ 50 MG TB24    TAKE 1 TABLET BY MOUTH ONCE DAILY    NABUMETONE (RELAFEN) 500 MG TABLET    Take 1 tablet (500 mg total) by mouth 2 (two) times daily.    NITROGLYCERIN (NITROSTAT) 0.4 MG SL TABLET    DISSOLVE 1 TABLET UNDER TONGUE EVERY 5 MINUTES FOR CHEST PAIN (MAY TAKE UP TO 3 DOSES THEN SEEK MEDICAL ATTENTION)    ONDANSETRON (ZOFRAN-ODT) 4 MG TBDL    Take 1 tablet (4 mg total) by mouth every 6 (six) hours as needed.    PANTOPRAZOLE (PROTONIX) 40 MG TABLET    " Take 40 mg by mouth 2 (two) times daily.    POTASSIUM CHLORIDE SA (K-DUR,KLOR-CON) 20 MEQ TABLET    Take 1 tablet (20 mEq total) by mouth once daily. Take extra dose with Lasix - fluid pill    RANOLAZINE (RANEXA) 1,000 MG TB12    TAKE 1 TABLET BY MOUTH TWICE DAILY    TRIAMCINOLONE ACETONIDE 0.025% (KENALOG) 0.025 % CREAM    Apply topically 2 (two) times daily. PRN rash and itching of ear. Mild steroid cream.   Modified Medications    No medications on file   Discontinued Medications    No medications on file       Review of Systems   Constitutional:  Positive for malaise/fatigue.   HENT: Negative.     Eyes: Negative.    Respiratory:  Positive for cough and shortness of breath.    Cardiovascular:  Positive for chest pain and palpitations.   Gastrointestinal: Negative.    Genitourinary: Negative.    Musculoskeletal: Negative.    Skin: Negative.    Neurological:  Positive for dizziness and loss of consciousness.   Endo/Heme/Allergies: Negative.    Psychiatric/Behavioral: Negative.     All 12 systems otherwise negative.      Wt Readings from Last 3 Encounters:   10/19/23 48.9 kg (107 lb 12.9 oz)   10/11/23 49 kg (108 lb 0.4 oz)   09/15/23 49 kg (108 lb)     Temp Readings from Last 3 Encounters:   09/21/23 98.1 °F (36.7 °C) (Skin)   09/15/23 96.2 °F (35.7 °C) (Tympanic)   09/12/23 97.4 °F (36.3 °C) (Temporal)     BP Readings from Last 3 Encounters:   10/19/23 (!) 140/62   10/11/23 119/65   09/21/23 138/66     Pulse Readings from Last 3 Encounters:   10/19/23 84   10/11/23 81   09/21/23 89       BP (!) 140/62 (BP Location: Right arm, Patient Position: Sitting, BP Method: Medium (Manual))   Pulse 84   Ht 5' 8" (1.727 m)   Wt 48.9 kg (107 lb 12.9 oz)   LMP  (LMP Unknown)   SpO2 99%   BMI 16.39 kg/m²     Objective:   Physical Exam  Vitals and nursing note reviewed.   Constitutional:       General: She is not in acute distress.     Appearance: She is well-developed. She is not diaphoretic.   HENT:      Head: " Normocephalic and atraumatic.      Nose: Nose normal.   Eyes:      General: No scleral icterus.     Conjunctiva/sclera: Conjunctivae normal.   Neck:      Thyroid: No thyromegaly.      Vascular: No JVD.   Cardiovascular:      Rate and Rhythm: Normal rate and regular rhythm.      Heart sounds: S1 normal and S2 normal. No murmur heard.     No friction rub. No gallop. No S3 or S4 sounds.   Pulmonary:      Effort: Pulmonary effort is normal. No respiratory distress.      Breath sounds: Normal breath sounds. No stridor. No wheezing or rales.   Chest:      Chest wall: No tenderness.   Abdominal:      General: Bowel sounds are normal. There is no distension.      Palpations: Abdomen is soft. There is no mass.      Tenderness: There is no abdominal tenderness. There is no rebound.   Genitourinary:     Comments: Deferred  Musculoskeletal:         General: No tenderness or deformity. Normal range of motion.      Cervical back: Normal range of motion and neck supple.   Lymphadenopathy:      Cervical: No cervical adenopathy.   Skin:     General: Skin is warm and dry.      Coloration: Skin is not pale.      Findings: No erythema or rash.   Neurological:      Mental Status: She is alert and oriented to person, place, and time.      Motor: No abnormal muscle tone.      Coordination: Coordination normal.   Psychiatric:         Behavior: Behavior normal.         Thought Content: Thought content normal.         Judgment: Judgment normal.         Lab Results   Component Value Date     10/16/2023    K 3.4 (L) 10/16/2023     10/16/2023    CO2 23 10/16/2023    BUN 11 10/16/2023    CREATININE 0.8 10/16/2023    GLU 70 10/16/2023    HGBA1C 4.7 08/31/2023    MG 1.3 (L) 08/20/2023    AST 20 08/31/2023    ALT 18 08/31/2023    ALBUMIN 2.7 (L) 08/31/2023    PROT 7.1 08/31/2023    BILITOT 0.5 08/31/2023    WBC 5.45 10/16/2023    HGB 11.1 (L) 10/16/2023    HCT 34.7 (L) 10/16/2023    MCV 93 10/16/2023     10/16/2023    INR 1.0  11/05/2020    TSH 1.004 09/15/2023    CHOL 144 08/31/2023    HDL 69 08/31/2023    LDLCALC 62.4 (L) 08/31/2023    TRIG 63 08/31/2023     (H) 05/22/2023     Assessment:      No diagnosis found.        Plan:   1. CAD with angina  - cont plavix, BB, Ranexa  - Nuclear stress neg 6/2023. ECHO 6/2/2023 with normal bi V function.   - add Imdur   - PRN NTG    2. HTN - with occ edema, labile, stable now  - titrate meds  - rec compression stockings    3. HFPEF with hyponatremia   - elevated BNP  - cont lasix PRN   - refer to nephro as well - h/o hyponatremia maybe sec to volume overload vs noncardiac etiologies     4. H/o CVA/Seizures  - cont meds per neuro  - needs to f/u   - carotid u/s - normal    5. Palpitation with tachycardia  - cont Dilt   - cont BB  - f/u Dr. Darling as needed, recent Vital monitor 2/2023 with occ SVT episodes - not sustained     6. COPD, h/o COVID 19  x 6   - cont meds per PCP/pulm  - neg for MEENA     7. Anemia - fe def  - cont iron, repeat iron levels ordered  - f/u heme/onc      7. Debility with dizziness  - f/u with PT/OT  - f/u ENT and PMR     Thank you for allowing me to participate in this patient's care. Please do not hesitate to contact me with any questions or concerns. Consult note has been forwarded to the referral physician.

## 2023-10-19 NOTE — ASSESSMENT & PLAN NOTE
Today's CBC show mild anemia with hg 11.1. No iron deficiency       Will arrange 1 month f/u with cbc, iron, ferritin, bmp. Sooner f/u PRN.     Colonoscopy 9/21/2023 with 4 polyps in descending colon. 2 polyps transverse colon. All benign per pathology report, no repeat surveillance Colonoscopy recommended due to age

## 2023-10-19 NOTE — PROGRESS NOTES
Subjective:       Patient ID: Shireen Felix is a 77 y.o. female.    Chief Complaint: f/u h/o easy bruising and anemia    HPI: 77 y.o female with h/o history of brain aneurysm status post 2 brain surgeries in  and , history of provoked proximal lower extremity DVT  treated with short-term anticoagulation, diabetes type 2, hypertension, frequent falls, arthritis, COPD, seizures who was originally referred to us for evaluation of spontaneous ecchymosis. Workup revealed normal PT/PTT. significant iron deficiency with saturated iron level 2%, TIBC 494, Ferritin 17. CBC was significant for microcytic anemia with hemoglobin 7.4.  She was infused IV iron x 2 doses 2020. She denies GI evaluation. Eventually she had further workup for c/o bruising. SPEP normal. FLC revealed elevated kappa/lamda with normal ratio. YANNICK showed faint oligoclonal banding pattern. Von Willebrand disorder was ruled out. Patient continued follow up for her anemia but has been lost to follow up since     Patient presents today for follow up due to recent recurrent anemia which onset 2023 during time of GI illness, dehydration, UTI. Patient hospitalized during this time treated for salmonella infectious diarrhea, UTI, HARESH, electrolyte abnormalities. Of note she did have CTA to r/o GI bleed in hospital. No evidence of GI bleed. She notes weakness and fatigue but feels better today    Hospital notes reviewed        Today's visit:  Patient presenting today for routine follow up of her anemia. She c/o chronic fatigue, weakness.   Social History     Socioeconomic History    Marital status: Single   Tobacco Use    Smoking status: Former     Current packs/day: 0.00     Average packs/day: 1 pack/day for 42.3 years (42.3 ttl pk-yrs)     Types: Cigarettes     Start date:      Quit date: 2004     Years since quittin.5    Smokeless tobacco: Former   Substance and Sexual Activity    Alcohol use: No    Drug use: Never     Sexual activity: Not Currently     Partners: Male   Social History Narrative    No pets or smokers in household.     Social Determinants of Health     Stress: Stress Concern Present (8/20/2020)    [employer] [place] [employer] - Occupational Stress Questionnaire     Feeling of Stress : Very much       Past Medical History:   Diagnosis Date    Abnormal ECG 8/5/2019    Aneurysm     Anticoagulant long-term use     Arthritis     CAD in native artery 8/5/2019    COPD (chronic obstructive pulmonary disease)     Diabetes mellitus     Fall 10/10/2019    Formatting of this note might be different from the original. Found sitting on floor next to bed last night Mild confusion today Does not recall falling or how she ended up on floor UA today Labs yesterday unremarkable    Glaucoma     Hypertension     Seizures     Shingles 05/27/2017    Stroke        Family History   Problem Relation Age of Onset    No Known Problems Mother     No Known Problems Father     Breast cancer Maternal Aunt     Breast cancer Maternal Aunt     Breast cancer Maternal Aunt        Past Surgical History:   Procedure Laterality Date    BRAIN SURGERY      CARDIAC CATHETERIZATION      COLONOSCOPY N/A 09/21/2023    Procedure: COLONOSCOPY;  Surgeon: Joanna Leal MD;  Location: [facility];  Service: Endoscopy;  Laterality: N/A;    CORONARY ANGIOPLASTY      EPIDURAL STEROID INJECTION INTO LUMBAR SPINE Bilateral 11/12/2019    Procedure: TF XANDER L4/5;  Surgeon: Desean Dias MD;  Location: [facility] PAIN MGT;  Service: Pain Management;  Laterality: Bilateral;    HYSTERECTOMY      INJECTION OF ANESTHETIC AGENT AROUND MEDIAL BRANCH NERVES INNERVATING LUMBAR FACET JOINT Bilateral 01/31/2020    Procedure: Bilateral L3-5 MBB;  Surgeon: Desean Dias MD;  Location: [facility] PAIN MGT;  Service: Pain Management;  Laterality: Bilateral;    INJECTION OF ANESTHETIC AGENT INTO SACROILIAC JOINT Right 11/16/2021    Procedure: Right  BLOCK, SACROILIAC JOINT Right GTB with RN IV sedation;  Surgeon: Yao Fulton MD;  Location: [facility]IN [facility]GT;  Service: Pain Management;  Laterality: Right;    INJECTION OF ANESTHETIC AGENT INTO SACROILIAC JOINT Bilateral 07/28/2023    Procedure: Bilateral GT bursa + bilateral SIJ injection;  Surgeon: Francisco Oshea MD;  Location: [facility]IN [facility]GT;  Service: Pain Management;  Laterality: Bilateral;    INJECTION OF JOINT Bilateral 07/09/2020    Procedure: Bilateral shoulder GH Joint injection with local;  Surgeon: Desean Dias MD;  Location: [facility]IN [facility]GT;  Service: Pain Management;  Laterality: Bilateral;    INJECTION OF JOINT Bilateral 07/28/2023    Procedure: Bilateral GT bursa + bilateral SIJ injection NEEDS ADDITIONAL SEDATION AND MORE TIME BEFORE INJECTION RN IV Sedation;  Surgeon: Francisco Oshea MD;  Location: [facility];  Service: Pain Management;  Laterality: Bilateral;    OOPHORECTOMY      SELECTIVE INJECTION OF ANESTHETIC AGENT AROUND LUMBAR SPINAL NERVE ROOT BY TRANSFORAMINAL APPROACH Bilateral 04/26/2023    Procedure: Bilateral L4/5 TF XANDER RN IV Sedation;  Surgeon: Francisco Oshea MD;  Location: [facility]GT;  Service: Pain Management;  Laterality: Bilateral;    TRANSFORAMINAL EPIDURAL INJECTION OF STEROID Bilateral 07/23/2019    Procedure: Bilateral L3/4 Transforaminal Epidural Steroid Injection;  Surgeon: Desean Dias MD;  [facility];  Service: Pain Management;  Laterality: Bilateral;    TRANSFORAMINAL EPIDURAL INJECTION OF STEROID Bilateral 03/10/2020    Procedure: Right T12/L1 TF XANDER with local;  Surgeon: Desean Dias MD;  Location: [facility];  Service: Pain Management;  Laterality: Bilateral;    TRANSFORAMINAL EPIDURAL INJECTION OF STEROID Right 06/19/2020    Procedure: Right T12/L1 TFESI Covid day of procedure;  Surgeon: Desean Dias MD;  Location: [facility];  Service: Pain Management;  Laterality: Right;       Review of Systems   Constitutional:  Positive  for fatigue. Negative for activity change, appetite change, chills, diaphoresis, fever and unexpected weight change.   HENT:  Negative for congestion, mouth sores, nosebleeds, sore throat, trouble swallowing and voice change.    Eyes:  Negative for visual disturbance.   Respiratory:  Negative for cough, chest tightness, shortness of breath (intermittent) and wheezing.    Cardiovascular:  Negative for chest pain and leg swelling.   Gastrointestinal:  Negative for abdominal distention, abdominal pain, anal bleeding, blood in stool, constipation, diarrhea, nausea and vomiting.   Genitourinary:  Negative for difficulty urinating, dysuria and hematuria.   Musculoskeletal:  Positive for arthralgias and back pain. Negative for myalgias.   Skin:  Negative for pallor, rash and wound.   Neurological:  Positive for weakness. Negative for dizziness, seizures, syncope and headaches.   Hematological:  Negative for adenopathy. Does not bruise/bleed easily.   Psychiatric/Behavioral:  The patient is not nervous/anxious.          Medication List with Changes/Refills   Current Medications    ALBUTEROL (PROVENTIL) 2.5 MG /3 ML (0.083 %) NEBULIZER SOLUTION    Take 2.5 mg by nebulization every 6 (six) hours as needed for Wheezing. Rescue    ALBUTEROL (PROVENTIL/VENTOLIN HFA) 90 MCG/ACTUATION INHALER    INHALE TWO PUFFS INTO THE LUNGS EVERY 4 HOURS AS NEEDED    APTIOM 400 MG TAB TABLET    Take 400 mg by mouth.    ATORVASTATIN (LIPITOR) 40 MG TABLET    TAKE 1 TABLET BY MOUTH ONCE DAILY    CARVEDILOL (COREG) 6.25 MG TABLET    Take 1 tablet (6.25 mg total) by mouth 2 (two) times daily with meals.    CHOLECALCIFEROL, VITAMIN D3, 1,250 MCG (50,000 UNIT) CAPSULE    Take 1 capsule (50,000 Units total) by mouth every 7 days.    CHOLESTYRAMINE (QUESTRAN) 4 GRAM PACKET    Take 1 packet (4 g total) by mouth 2 (two) times daily.    CLOPIDOGREL (PLAVIX) 75 MG TABLET    Take 1 tablet (75 mg total) by mouth once daily.    DALIRESP 500 MCG TAB    TAKE 1  TABLET BY MOUTH ONCE DAILY    DENOSUMAB (PROLIA) 60 MG/ML SYRG    every 6 (six) months.    DICYCLOMINE (BENTYL) 10 MG CAPSULE    Take 1 capsule (10 mg total) by mouth 2 (two) times daily.    DILTIAZEM (CARDIZEM CD) 120 MG CP24    Take 2 capsules (240 mg total) by mouth once daily.    DORZOLAMIDE-TIMOLOL 2-0.5% (COSOPT) 22.3-6.8 MG/ML OPHTHALMIC SOLUTION    Place 1 drop into both eyes every 12 (twelve) hours.    ESCITALOPRAM OXALATE (LEXAPRO) 20 MG TABLET    TAKE 1 TABLET BY MOUTH ONCE DAILY    FLUTICASONE PROPIONATE (FLONASE) 50 MCG/ACTUATION NASAL SPRAY    USE 2 SPRAYS INTO EACH NOSTRIL ONCE A DAY AT 6AM AS DIRECTED    FLUTICASONE-UMECLIDIN-VILANTER (TRELEGY ELLIPTA) 200-62.5-25 MCG INHALER    INHALE 1 PUFF BY MOUTH ONCE A DAY    FUROSEMIDE (LASIX) 20 MG TABLET    Take 20 mg by mouth daily as needed.    GABAPENTIN (NEURONTIN) 300 MG CAPSULE    Take 1 capsule (300 mg total) by mouth 3 (three) times daily.    HYDROCODONE-ACETAMINOPHEN (NORCO) 5-325 MG PER TABLET    Take 1 tablet by mouth every 6 (six) hours as needed for Pain.    IPRATROPIUM (ATROVENT) 42 MCG (0.06 %) NASAL SPRAY    USE 2 SPRAYS INTO EACH NOSTRIL FOUR TIMES DAILY    ISOSORBIDE MONONITRATE (IMDUR) 60 MG 24 HR TABLET    Take 1 tablet (60 mg total) by mouth every evening.    KETOCONAZOLE (NIZORAL) 2 % CREAM    Apply topically 2 (two) times daily. For dry flaky patches of ear.    LATANOPROST 0.005 % OPHTHALMIC SOLUTION    Place 1 drop into both eyes every evening.    LEVETIRACETAM (KEPPRA) 500 MG TAB    Take 1 tablet (500 mg total) by mouth 2 (two) times daily.    LINEZOLID (ZYVOX) 600 MG TAB    Take 1 tablet (600 mg total) by mouth every 12 (twelve) hours.    MAGNESIUM OXIDE (MAG-OX) 400 MG (241.3 MG MAGNESIUM) TABLET    Take 1 tablet (400 mg total) by mouth once daily.    MONTELUKAST (SINGULAIR) 10 MG TABLET    TAKE 1 TABLET BY MOUTH ONCE A DAY    MYRBETRIQ 50 MG TB24    TAKE 1 TABLET BY MOUTH ONCE DAILY    NABUMETONE (RELAFEN) 500 MG TABLET    Take 1  tablet (500 mg total) by mouth 2 (two) times daily.    NITROGLYCERIN (NITROSTAT) 0.4 MG SL TABLET    DISSOLVE 1 TABLET UNDER TONGUE EVERY 5 MINUTES FOR CHEST PAIN (MAY TAKE UP TO 3 DOSES THEN SEEK MEDICAL ATTENTION)    ONDANSETRON (ZOFRAN-ODT) 4 MG TBDL    Take 1 tablet (4 mg total) by mouth every 6 (six) hours as needed.    PANTOPRAZOLE (PROTONIX) 40 MG TABLET    Take 40 mg by mouth 2 (two) times daily.    POTASSIUM CHLORIDE SA (K-DUR,KLOR-CON) 20 MEQ TABLET    Take 1 tablet (20 mEq total) by mouth once daily. Take extra dose with Lasix - fluid pill    RANOLAZINE (RANEXA) 1,000 MG TB12    TAKE 1 TABLET BY MOUTH TWICE DAILY    TRIAMCINOLONE ACETONIDE 0.025% (KENALOG) 0.025 % CREAM    Apply topically 2 (two) times daily. PRN rash and itching of ear. Mild steroid cream.     Objective:     Vitals:    10/19/23 0907   BP: 122/70   Pulse: 83   Temp: 98 °F (36.7 °C)     Lab Results   Component Value Date    WBC 5.45 10/16/2023    HGB 11.1 (L) 10/16/2023    HCT 34.7 (L) 10/16/2023    MCV 93 10/16/2023     10/16/2023     BMP  Lab Results   Component Value Date     10/16/2023    K 3.4 (L) 10/16/2023     10/16/2023    CO2 23 10/16/2023    BUN 11 10/16/2023    CREATININE 0.8 10/16/2023    CALCIUM 9.5 10/16/2023    ANIONGAP 15 10/16/2023    ESTGFRAFRICA >60 06/02/2022    EGFRNONAA >60 06/02/2022     Lab Results   Component Value Date    ALT 18 08/31/2023    AST 20 08/31/2023    ALKPHOS 59 08/31/2023    BILITOT 0.5 08/31/2023         Physical Exam  Vitals reviewed.   Constitutional:       Appearance: She is well-developed.   HENT:      Head: Normocephalic.      Right Ear: External ear normal.      Left Ear: External ear normal.   Eyes:      General: Lids are normal. No scleral icterus.        Right eye: No discharge.         Left eye: No discharge.      Conjunctiva/sclera: Conjunctivae normal.   Neck:      Thyroid: No thyroid mass.   Cardiovascular:      Rate and Rhythm: Normal rate and regular rhythm.       Heart sounds: Normal heart sounds.   Pulmonary:      Effort: Pulmonary effort is normal. No respiratory distress.      Breath sounds: Normal breath sounds. No wheezing or rales.   Abdominal:      General: There is no distension.   Genitourinary:     Comments: deferred  Musculoskeletal:         General: Normal range of motion.      Cervical back: Normal range of motion.   Skin:     General: Skin is warm and dry.   Neurological:      Mental Status: She is alert and oriented to person, place, and time.   Psychiatric:         Speech: Speech normal.         Behavior: Behavior normal. Behavior is cooperative.         Thought Content: Thought content normal.        Assessment:     Problem List Items Addressed This Visit          Oncology    Iron deficiency anemia     Today's CBC show mild anemia with hg 11.1. No iron deficiency       Will arrange 1 month f/u with cbc, iron, ferritin, bmp. Sooner f/u PRN.     Colonoscopy 9/21/2023 with 4 polyps in descending colon. 2 polyps transverse colon. All benign per pathology report, no repeat surveillance Colonoscopy recommended due to age                Plan:     Other iron deficiency anemia          Med Onc Chart Routing      Follow up with physician    Follow up with GLENN 1 month.   Infusion scheduling note    Injection scheduling note    Labs CMP, CBC, ferritin, iron and TIBC, other, reticulocytes and LDH   Scheduling:  Preferred lab:  Lab interval:     Imaging None      Pharmacy appointment No pharmacy appointment needed      Other referrals       No additional referrals needed                 MAUREEN Leon

## 2023-10-20 RX ORDER — METFORMIN HYDROCHLORIDE 500 MG/1
TABLET ORAL
Qty: 90 TABLET | Refills: 3 | OUTPATIENT
Start: 2023-10-20

## 2023-10-23 ENCOUNTER — TELEPHONE (OUTPATIENT)
Dept: PAIN MEDICINE | Facility: CLINIC | Age: 78
End: 2023-10-23
Payer: MEDICARE

## 2023-10-23 NOTE — TELEPHONE ENCOUNTER
----- Message from Ruslan Devine sent at 10/23/2023 12:36 PM CDT -----  Contact: Shireen Dao is calling to discuss her upcoming procedure. Please call back at [phone]                  Thanks  SW

## 2023-10-23 NOTE — TELEPHONE ENCOUNTER
Called pt to set up their procedure. Pt answered and procedure has been made. Inform pt on the procedure instruction.Pt understood and all question answered.   Pt reported that stop talking her Plavix on 10/21/23    Jimi Stockton   Medical Assistant

## 2023-10-24 NOTE — PRE-PROCEDURE INSTRUCTIONS
Spoke with patients brother regarding procedure scheduled on 10.25     Arrival time 1045     Has patient been sick with fever or on antibiotics within the last 7 days? No     Does the patient have any open wounds, sores or rashes? No     Does the patient have any recent fractures? no     Has patient received a vaccination within the last 7 days? No     Received the COVID vaccination? yes     Has the patient stopped all medications as directed? Hold Plavix 5 days prior to procedure. Cardiac clearance obtained from dr bui on 10.11.     Does patient have a pacemaker, defibrillator, or implantable stimulator? No     Does the patient have a ride to and from procedure and someone reliable to remain with patient?  brother     Is the patient diabetic? yes     Does the patient have sleep apnea? Or use O2 at home? No and no     Is the patient receiving sedation? yes     Is the patient instructed to remain NPO beginning at midnight the night before their procedure? yes     Procedure location confirmed with patient? Yes     Covid- Denies signs/symptoms. Instructed to notify PAT/MD if any changes.

## 2023-10-25 ENCOUNTER — HOSPITAL ENCOUNTER (OUTPATIENT)
Facility: HOSPITAL | Age: 78
Discharge: HOME | End: 2023-10-25
Attending: ANESTHESIOLOGY | Admitting: ANESTHESIOLOGY
Payer: MEDICARE

## 2023-10-25 VITALS
OXYGEN SATURATION: 99 % | SYSTOLIC BLOOD PRESSURE: 128 MMHG | HEART RATE: 74 BPM | HEIGHT: 64 IN | BODY MASS INDEX: 18.08 KG/M2 | WEIGHT: 105.94 LBS | RESPIRATION RATE: 18 BRPM | TEMPERATURE: 98 F | DIASTOLIC BLOOD PRESSURE: 90 MMHG

## 2023-10-25 DIAGNOSIS — M53.3 COCCYDYNIA: ICD-10-CM

## 2023-10-25 LAB — POCT GLUCOSE: 103 MG/DL (ref 70–110)

## 2023-10-25 PROCEDURE — 64999 PR GANGLION IMPAR INJECTION: ICD-10-PCS | Mod: ,,, | Performed by: ANESTHESIOLOGY

## 2023-10-25 PROCEDURE — 25000003 PHARM REV CODE 250: Performed by: ANESTHESIOLOGY

## 2023-10-25 PROCEDURE — 64999 UNLISTED PX NERVOUS SYSTEM: CPT | Mod: ,,, | Performed by: ANESTHESIOLOGY

## 2023-10-25 PROCEDURE — 20550 NJX 1 TENDON SHEATH/LIGAMENT: CPT | Performed by: ANESTHESIOLOGY

## 2023-10-25 PROCEDURE — 25500020 PHARM REV CODE 255: Performed by: ANESTHESIOLOGY

## 2023-10-25 PROCEDURE — 63600175 PHARM REV CODE 636 W HCPCS: Performed by: ANESTHESIOLOGY

## 2023-10-25 RX ORDER — FENTANYL CITRATE 50 UG/ML
INJECTION, SOLUTION INTRAMUSCULAR; INTRAVENOUS
Status: DISCONTINUED | OUTPATIENT
Start: 2023-10-25 | End: 2023-10-25 | Stop reason: HOSPADM

## 2023-10-25 RX ORDER — DEXAMETHASONE SODIUM PHOSPHATE 10 MG/ML
INJECTION INTRAMUSCULAR; INTRAVENOUS
Status: DISCONTINUED | OUTPATIENT
Start: 2023-10-25 | End: 2023-10-25 | Stop reason: HOSPADM

## 2023-10-25 RX ORDER — LIDOCAINE HYDROCHLORIDE 10 MG/ML
INJECTION, SOLUTION EPIDURAL; INFILTRATION; INTRACAUDAL; PERINEURAL
Status: DISCONTINUED | OUTPATIENT
Start: 2023-10-25 | End: 2023-10-25 | Stop reason: HOSPADM

## 2023-10-25 RX ORDER — SODIUM BICARBONATE 1 MEQ/ML
SYRINGE (ML) INTRAVENOUS
Status: DISCONTINUED | OUTPATIENT
Start: 2023-10-25 | End: 2023-10-25 | Stop reason: HOSPADM

## 2023-10-25 NOTE — DISCHARGE INSTRUCTIONS

## 2023-10-25 NOTE — OP NOTE
Procedure: Sacrococcygeal ligament Injection/Ganglion Impar Block    Pre-op diagnosis: Coccydynia [M53.3]     Post-op diagnosis: Coccydynia [M53.3]     Surgeon: Francisco Oshea MD    Assistants: None    EBL: None     Specimens: None     Complications: None       Sedation:  Conscious sedation provided by M.D    The patient was monitored with continuous pulse oximetry, EKG, and intermittent blood pressure monitors.  The patient was hemodynamically stable throughout the entire process was responsive to voice, and breathing spontaneously.  Supplemental O2 was provided at 2L/min via nasal cannula.  Patient was comfortable for the duration of the procedure. (See nurse documentation and case log for sedation time)    There was a total of 0mg IV Midazolam and 100mcg Fentanyl titrated for the procedure      Description of procedure:     After written consent was obtained, patient was placed in the prone position on the xray table. The area overlying the coccyx was identified using anatomical landmarks and fluoroscopy in the lateral view. The area overlying the skin was prepped and draped in usual sterile fashion using chlorhexidine. The skin was then anesthetized through a 25 gauge needle with 3mL of 1% lidocaine. A 3.5 inch 22 gauge spinal quincke needle was then advanced until the coccygeal ligament was encountered and entered. Using fluoroscopy in the lateral view, the needle was advanced through the coccygeal ligament.  Then 3 mL of 300mg/ml radioopaque contrast dye was injected under live fluoroscopy, and seen to go into the area of the ganglion impar. After negative aspiration, negative paresthesias, there was injection of 4 mL of 0.25% bupivacaine + 1 mL of 40mg/mL depo medrol for a total volume of 5mL was injected. Displacement of the contrast after injection of the medication confirmed that the medication went into the area of the ganglion impar. Patient tolerated the procedure well and was taken to the recovery room  in stable condition.

## 2023-10-25 NOTE — DISCHARGE SUMMARY
Discharge Note  Short Stay      SUMMARY     Admit Date: 10/25/2023    Attending Physician: Francisco Oshea MD        Discharge Physician: Francisco Oshea MD        Discharge Date: 10/25/2023 11:52 AM    Procedure(s) (LRB):  Sacrococcygeal joint injection (N/A)    Final Diagnosis: Coccydynia [M53.3]    Disposition: Home or self care    Patient Instructions:   Current Discharge Medication List        CONTINUE these medications which have NOT CHANGED    Details   carvediloL (COREG) 6.25 MG tablet Take 1 tablet (6.25 mg total) by mouth 2 (two) times daily with meals.  Qty: 180 tablet, Refills: 1    Comments: Please bring meds to patient's bedside prior to discharge      albuterol (PROVENTIL) 2.5 mg /3 mL (0.083 %) nebulizer solution Take 2.5 mg by nebulization every 6 (six) hours as needed for Wheezing. Rescue      albuterol (PROVENTIL/VENTOLIN HFA) 90 mcg/actuation inhaler INHALE TWO PUFFS INTO THE LUNGS EVERY 4 HOURS AS NEEDED  Qty: 18 g, Refills: 11    Comments: This prescription was filled on 11/2/2022. Any refills authorized will be placed on file.  Associated Diagnoses: Mucopurulent chronic bronchitis      APTIOM 400 mg Tab tablet Take 400 mg by mouth.      atorvastatin (LIPITOR) 40 MG tablet TAKE 1 TABLET BY MOUTH ONCE DAILY  Qty: 90 tablet, Refills: 1    Associated Diagnoses: Coronary artery disease of native artery of native heart with stable angina pectoris      cholecalciferol, vitamin D3, 1,250 mcg (50,000 unit) capsule Take 1 capsule (50,000 Units total) by mouth every 7 days.  Qty: 12 capsule, Refills: 0    Associated Diagnoses: Vitamin D insufficiency      cholestyramine (QUESTRAN) 4 gram packet Take 1 packet (4 g total) by mouth 2 (two) times daily.  Qty: 60 packet, Refills: 2    Associated Diagnoses: Infectious diarrhea      clopidogreL (PLAVIX) 75 mg tablet Take 1 tablet (75 mg total) by mouth once daily.  Qty: 90 tablet, Refills: 3    Associated Diagnoses: Coronary artery disease of native artery of  native heart with stable angina pectoris      DALIRESP 500 mcg Tab TAKE 1 TABLET BY MOUTH ONCE DAILY  Qty: 90 tablet, Refills: 3      denosumab (PROLIA) 60 mg/mL Syrg every 6 (six) months.      dicyclomine (BENTYL) 10 MG capsule Take 1 capsule (10 mg total) by mouth 2 (two) times daily.  Qty: 10 capsule, Refills: 0    Comments: Please bring meds to patient's bedside prior to discharge      diltiaZEM (CARDIZEM CD) 120 MG Cp24 Take 2 capsules (240 mg total) by mouth once daily.  Qty: 180 capsule, Refills: 1    Comments: This prescription was filled on 7/19/2023. Any refills authorized will be placed on file.  Associated Diagnoses: Primary hypertension      dorzolamide-timolol 2-0.5% (COSOPT) 22.3-6.8 mg/mL ophthalmic solution Place 1 drop into both eyes every 12 (twelve) hours.  Qty: 10 mL, Refills: 12    Associated Diagnoses: Primary open angle glaucoma (POAG) of both eyes, moderate stage      EScitalopram oxalate (LEXAPRO) 20 MG tablet TAKE 1 TABLET BY MOUTH ONCE DAILY  Qty: 90 tablet, Refills: 1    Comments: This prescription was filled on 3/31/2023. Any refills authorized will be placed on file.  Associated Diagnoses: Moderate recurrent major depression      fluticasone propionate (FLONASE) 50 mcg/actuation nasal spray USE 2 SPRAYS INTO EACH NOSTRIL ONCE A DAY AT 6AM AS DIRECTED  Qty: 16 g, Refills: 11    Associated Diagnoses: Non-seasonal allergic rhinitis due to other allergic trigger      fluticasone-umeclidin-vilanter (TRELEGY ELLIPTA) 200-62.5-25 mcg inhaler INHALE 1 PUFF BY MOUTH ONCE A DAY  Qty: 60 each, Refills: 11    Associated Diagnoses: Moderate persistent asthma without complication      furosemide (LASIX) 20 MG tablet Take 20 mg by mouth daily as needed.      gabapentin (NEURONTIN) 300 MG capsule Take 1 capsule (300 mg total) by mouth 3 (three) times daily.  Qty: 90 capsule, Refills: 1      HYDROcodone-acetaminophen (NORCO) 5-325 mg per tablet Take 1 tablet by mouth every 6 (six) hours as needed for  Pain.      ipratropium (ATROVENT) 42 mcg (0.06 %) nasal spray USE 2 SPRAYS INTO EACH NOSTRIL FOUR TIMES DAILY  Qty: 15 mL, Refills: 11    Associated Diagnoses: Non-seasonal allergic rhinitis due to other allergic trigger      isosorbide mononitrate (IMDUR) 60 MG 24 hr tablet Take 1 tablet (60 mg total) by mouth every evening.  Qty: 90 tablet, Refills: 1      ketoconazole (NIZORAL) 2 % cream Apply topically 2 (two) times daily. For dry flaky patches of ear.  Qty: 30 g, Refills: 1    Associated Diagnoses: Seborrheic dermatitis      latanoprost 0.005 % ophthalmic solution Place 1 drop into both eyes every evening.  Qty: 2.5 mL, Refills: 12    Associated Diagnoses: Primary open angle glaucoma (POAG) of both eyes, moderate stage      levETIRAcetam (KEPPRA) 500 MG Tab Take 1 tablet (500 mg total) by mouth 2 (two) times daily.  Qty: 180 tablet, Refills: 3      linezolid (ZYVOX) 600 mg Tab Take 1 tablet (600 mg total) by mouth every 12 (twelve) hours.  Qty: 14 tablet, Refills: 0    Associated Diagnoses: Cellulitis of right thumb      magnesium oxide (MAG-OX) 400 mg (241.3 mg magnesium) tablet Take 1 tablet (400 mg total) by mouth once daily.  Qty: 90 tablet, Refills: 1      montelukast (SINGULAIR) 10 mg tablet TAKE 1 TABLET BY MOUTH ONCE A DAY  Qty: 90 tablet, Refills: 3    Comments: This prescription was filled on 11/2/2022. Any refills authorized will be placed on file.  Associated Diagnoses: Chronic allergic rhinitis      MYRBETRIQ 50 mg Tb24 TAKE 1 TABLET BY MOUTH ONCE DAILY  Qty: 30 tablet, Refills: 0      nabumetone (RELAFEN) 500 MG tablet Take 1 tablet (500 mg total) by mouth 2 (two) times daily.  Qty: 60 tablet, Refills: 2      nitroGLYCERIN (NITROSTAT) 0.4 MG SL tablet DISSOLVE 1 TABLET UNDER TONGUE EVERY 5 MINUTES FOR CHEST PAIN (MAY TAKE UP TO 3 DOSES THEN SEEK MEDICAL ATTENTION)  Qty: 25 tablet, Refills: 0      ondansetron (ZOFRAN-ODT) 4 MG TbDL Take 1 tablet (4 mg total) by mouth every 6 (six) hours as  needed.  Qty: 30 tablet, Refills: 0      pantoprazole (PROTONIX) 40 MG tablet Take 40 mg by mouth 2 (two) times daily.      potassium chloride SA (K-DUR,KLOR-CON) 20 MEQ tablet Take 1 tablet (20 mEq total) by mouth once daily. Take extra dose with Lasix - fluid pill  Qty: 90 tablet, Refills: 1      ranolazine (RANEXA) 1,000 mg Tb12 TAKE 1 TABLET BY MOUTH TWICE DAILY  Qty: 180 tablet, Refills: 3    Associated Diagnoses: Coronary artery disease of native artery of native heart with stable angina pectoris      triamcinolone acetonide 0.025% (KENALOG) 0.025 % cream Apply topically 2 (two) times daily. PRN rash and itching of ear. Mild steroid cream.  Qty: 30 g, Refills: 0    Associated Diagnoses: Seborrheic dermatitis                 Discharge Diagnosis: Coccydynia [M53.3]  Condition on Discharge: Stable with no complications to procedure   Diet on Discharge: Same as before.  Activity: as per instruction sheet.  Discharge to: Home with a responsible adult.  Follow up: 2-4 weeks       Please call the office at [phone] if you experience any weakness or loss of sensation, fever > 101.5, pain uncontrolled with oral medications, persistent nausea/vomiting/or diarrhea, redness or drainage from the incisions, or any other worrisome concerns. If physician on call was not reached or could not communicate with our office for any reason please go to the nearest emergency department

## 2023-10-26 ENCOUNTER — PATIENT MESSAGE (OUTPATIENT)
Dept: RADIOLOGY | Facility: HOSPITAL | Age: 78
End: 2023-10-26
Payer: MEDICARE

## 2023-10-27 ENCOUNTER — TELEPHONE (OUTPATIENT)
Dept: PAIN MEDICINE | Facility: CLINIC | Age: 78
End: 2023-10-27
Payer: MEDICARE

## 2023-10-27 DIAGNOSIS — A09 INFECTIOUS DIARRHEA: ICD-10-CM

## 2023-10-27 RX ORDER — CHOLESTYRAMINE 4 G/9G
POWDER, FOR SUSPENSION ORAL
Qty: 60 PACKET | Refills: 11 | Status: SHIPPED | OUTPATIENT
Start: 2023-10-27

## 2023-10-27 NOTE — TELEPHONE ENCOUNTER
----- Message from Wendy Park sent at 10/27/2023  9:40 AM CDT -----  Contact: Shireen Martni self [phone]  Patient is returning a phone call.  Who left a message for the patient: not sure  Does patient know what this is regarding:    Would you like a call back, or a response through your [facility] portal?: call back  Comments: Pt was returning the nurses call     PAST SURGICAL HISTORY:  History of sleeve gastrectomy

## 2023-10-27 NOTE — TELEPHONE ENCOUNTER
Refills requested on cholestyramine (QUESTRAN) 4 gram packet; last refill given on 08/21/23 60 pks with 2 refills

## 2023-10-30 ENCOUNTER — TELEPHONE (OUTPATIENT)
Dept: PAIN MEDICINE | Facility: CLINIC | Age: 78
End: 2023-10-30
Payer: MEDICARE

## 2023-10-30 NOTE — TELEPHONE ENCOUNTER
----- Message from Selene Cavanaugh sent at 10/27/2023  3:38 PM CDT -----  Contact: Shireen  Type:  Patient Returning Call    Who Called:Shireen  Who Left Message for Patient:unknown  Does the patient know what this is regarding?:unknown  Would the patient rather a call back or a response via [facility]? call  Best Call Back Number:[phone]   Additional Information: Patient reports missing a call and request another call back, today, if possible. Patient reports having a procedure on 10/25/23 and request to notify staff.   Thank you,  [facility]

## 2023-10-30 NOTE — TELEPHONE ENCOUNTER
Reached out to patient to schedule appointment for a TPI. Apt has been made.   Pt understand. All questions answered.     Jimi Stockton  Medical Assistant

## 2023-10-30 NOTE — TELEPHONE ENCOUNTER
Reach out to pt from the message pt is requesting to get a in clinic injection in her neck until she is able to get her Cervical procedure. In from pt that I will give her a call once Christi responds back. Pt understood.

## 2023-10-31 ENCOUNTER — DOCUMENTATION ONLY (OUTPATIENT)
Dept: INTERNAL MEDICINE | Facility: CLINIC | Age: 78
End: 2023-10-31
Payer: MEDICARE

## 2023-11-08 ENCOUNTER — OFFICE VISIT (OUTPATIENT)
Dept: PAIN MEDICINE | Facility: CLINIC | Age: 78
End: 2023-11-08
Payer: MEDICARE

## 2023-11-08 VITALS
BODY MASS INDEX: 18.18 KG/M2 | SYSTOLIC BLOOD PRESSURE: 125 MMHG | DIASTOLIC BLOOD PRESSURE: 70 MMHG | HEART RATE: 78 BPM | HEIGHT: 64 IN

## 2023-11-08 DIAGNOSIS — L65.9 HAIR LOSS: ICD-10-CM

## 2023-11-08 DIAGNOSIS — M54.2 CERVICALGIA: ICD-10-CM

## 2023-11-08 DIAGNOSIS — M79.18 MYOFASCIAL MUSCLE PAIN: Primary | ICD-10-CM

## 2023-11-08 DIAGNOSIS — M46.1 SACROILIITIS: ICD-10-CM

## 2023-11-08 PROCEDURE — 3288F PR FALLS RISK ASSESSMENT DOCUMENTED: ICD-10-PCS | Mod: CPTII,S$GLB,, | Performed by: PHYSICIAN ASSISTANT

## 2023-11-08 PROCEDURE — 99214 OFFICE O/P EST MOD 30 MIN: CPT | Mod: 25,S$GLB,, | Performed by: PHYSICIAN ASSISTANT

## 2023-11-08 PROCEDURE — 20553 NJX 1/MLT TRIGGER POINTS 3/>: CPT | Mod: S$GLB,,, | Performed by: PHYSICIAN ASSISTANT

## 2023-11-08 PROCEDURE — 1100F PTFALLS ASSESS-DOCD GE2>/YR: CPT | Mod: CPTII,S$GLB,, | Performed by: PHYSICIAN ASSISTANT

## 2023-11-08 PROCEDURE — 1100F PR PT FALLS ASSESS DOC 2+ FALLS/FALL W/INJURY/YR: ICD-10-PCS | Mod: CPTII,S$GLB,, | Performed by: PHYSICIAN ASSISTANT

## 2023-11-08 PROCEDURE — 20553 PR INJECT TRIGGER POINTS, > 3: ICD-10-PCS | Mod: S$GLB,,, | Performed by: PHYSICIAN ASSISTANT

## 2023-11-08 PROCEDURE — 1159F PR MEDICATION LIST DOCUMENTED IN MEDICAL RECORD: ICD-10-PCS | Mod: CPTII,S$GLB,, | Performed by: PHYSICIAN ASSISTANT

## 2023-11-08 PROCEDURE — 1125F PR PAIN SEVERITY QUANTIFIED, PAIN PRESENT: ICD-10-PCS | Mod: CPTII,S$GLB,, | Performed by: PHYSICIAN ASSISTANT

## 2023-11-08 PROCEDURE — 99999 PR PBB SHADOW E&M-EST. PATIENT-LVL V: ICD-10-PCS | Mod: PBBFAC,,, | Performed by: PHYSICIAN ASSISTANT

## 2023-11-08 PROCEDURE — 1125F AMNT PAIN NOTED PAIN PRSNT: CPT | Mod: CPTII,S$GLB,, | Performed by: PHYSICIAN ASSISTANT

## 2023-11-08 PROCEDURE — 3078F PR MOST RECENT DIASTOLIC BLOOD PRESSURE < 80 MM HG: ICD-10-PCS | Mod: CPTII,S$GLB,, | Performed by: PHYSICIAN ASSISTANT

## 2023-11-08 PROCEDURE — 99214 PR OFFICE/OUTPT VISIT, EST, LEVL IV, 30-39 MIN: ICD-10-PCS | Mod: 25,S$GLB,, | Performed by: PHYSICIAN ASSISTANT

## 2023-11-08 PROCEDURE — 1159F MED LIST DOCD IN RCRD: CPT | Mod: CPTII,S$GLB,, | Performed by: PHYSICIAN ASSISTANT

## 2023-11-08 PROCEDURE — 3078F DIAST BP <80 MM HG: CPT | Mod: CPTII,S$GLB,, | Performed by: PHYSICIAN ASSISTANT

## 2023-11-08 PROCEDURE — 99999 PR PBB SHADOW E&M-EST. PATIENT-LVL V: CPT | Mod: PBBFAC,,, | Performed by: PHYSICIAN ASSISTANT

## 2023-11-08 PROCEDURE — 3074F PR MOST RECENT SYSTOLIC BLOOD PRESSURE < 130 MM HG: ICD-10-PCS | Mod: CPTII,S$GLB,, | Performed by: PHYSICIAN ASSISTANT

## 2023-11-08 PROCEDURE — 3074F SYST BP LT 130 MM HG: CPT | Mod: CPTII,S$GLB,, | Performed by: PHYSICIAN ASSISTANT

## 2023-11-08 PROCEDURE — 3288F FALL RISK ASSESSMENT DOCD: CPT | Mod: CPTII,S$GLB,, | Performed by: PHYSICIAN ASSISTANT

## 2023-11-08 NOTE — PROGRESS NOTES
Established Patient Interventional Pain Clinic Visit    Chief Pain Complaint:  Chief Complaint   Patient presents with    Low-back Pain    Neck Pain     Interval History (11/8/2023): Patient Shireen Felix presents today for follow-up visit.  she underwent sacrococcygeal joint injection on 10/25/23.  The patient reports that she is/was better following the procedure.  she reports 80% pain relief.  The changes lasted so far.  The changes have continued through this visit. The patient now c/o pain related to myofascial pain in upper back. She also pain in lower back (lumbar paraspinal) but the upper back pain is most concerning at this time.  Patient reports pain as 8/10 today.  The patient also c/o itching in her scalp with hair loss over the past two months.    Interval history 10/11/2023  Patient presents status post bilateral sacroiliac joint and greater trochanteric bursa injection 07/28/2023.  Patient reports approximately 90% sustained relief overlying bilateral sacroiliac joint and GT bursa.  Today she presents following a mechanical fall.  Patient reports approximately 5 days prior going to the bathroom in the middle of the night, losing her balance and hitting the back of her head and lower back.  Today she reports pain which is constant which is rated an 8/10.  Patient reports pain along bilateral cervical paraspinous musculature which is exacerbated with cervical flexion/extension and lateral flexion.  She reports this pain has improved over the last few days.  Her primary concern is pain along her coccyx.  Patient reports tenderness to touch in pain with sitting as well as sitting or standing.  Patient is requesting x-rays for evaluation.  She also would like to restart physical therapy.  Of note she is going to [facility] in [place] and would like to restart dry needling, soft massage and assistance with balance.    Interval History (6/27/2023):  Shireen Felix presents today for follow-up visit.   Patient was last seen on 5/16/2023. She reports some improvement in her pain with TPIs given at last visit. She reports continued variations in her BP throughout the day. She has followed up with Neurology who has made changes to her seizure medications. Seems to be tolerating med change well. Continues to monitor BP. Has followed up with PCP and other specialist to rule out other causes for BP changes (renal, intracranial, etc).  Patient reports pain as 7/10 today. No changes on CT compared to previous.  Interval History (6/14/2023):  Shireen Felix presents today for trigger point injections.  Patient was last seen on 06/09/2023. Patient reports pain as 8/10 today. She continues to report pain as primarily located in her lower lumbosacral region with radiation into her buttocks, right worse than left, and wrapping around her lateral hips. She reports additional falls since last visit. She reports worsening neck pain with radiation into her left arm down to her hand with cramping and burning.  She also reports worsening symptoms down both legs.  interfering with her ability to walk at times due to significant weakness. She reports repeated falls due to her legs giving out from underneath her unexpectedly.       Interval History (6/9/2023):  Shireen Felix presents today via telemed for follow-up visit.  Patient was last seen on 05/16/2023. Last injection bilateral L4/5 TFESI on 4/26/23 with 80% relief. Patient reports pain as 10/10 today. She reports pain that began over the past several weeks, became severe 2 days ago. She reports pain is primarily located in her lower lumbosacral region with radiation into her buttocks, right worse than left, and wrapping around her lateral hips. Pain is similar to that prior to SIJ and GT bursa injection, last SIJ injection 11/16/2021 with excellent relief until a few weeks ago when pain returned. She reports in the past she has also received trigger point injections  in her lower back that were very helpful.    Interval History (5/16/2023): Shireen Felix presents today for follow-up visit.  she underwent bilateral L4/5 TFESI on 4/26/23.  The patient reports that she is/was better following the procedure.  she reports 80% pain relief.  The changes lasted 4 weeks so far.  The changes have continued through this visit.  Patient reports pain as 7/10 today. She reports the procedure itself was very painful. Reports she did not feel sedated at all compared to previous procedures with other providers. She is not interested in pursuing additional procedures secondary to the painful procedure.  She reports worsening neck pain with radiation into her right arm down to her hand with cramping and burning.    Interval History (3/9/2023):  Shireen Felix presents today for follow-up visit for CT and EMG review.  Patient was last seen on 2/1/2023. At that visit, the plan was to order updated CT of her cervical and lumbar spine as well as EMG of her bilateral upper extremities. Patient reports pain as 7/10 today.  She reports worsening neck pain with radiation into her left arm down to her hand with cramping and burning.  She also reports worsening symptoms down both legs.  interfering with her ability to walk at times due to significant weakness. Has an appointment with Dr. Navarro on 03/21/2023. Patient fell hit head on concrete 2-11-23, and had a seizure shortly after.  She reports repeated falls due to her legs giving out from underneath her unexpectedly. Referred to Neurology.  She is also currently on antibiotics secondary to having 10 teeth pulled     Interval History (1/31/2023):  Shireen Felix presents today for follow-up visit.  Patient was last seen on 10/4/2022. At that visit, the plan was to schedule bilateral L3-5 MBB followed 2 weeks later by XANDER.  She canceled these procedures and reports at this time she is not interested in scheduling.  She reports she has a   and goes to the gym 3-4 times per week which includes walking on a treadmill.   Patient reports pain as 7/10 today. She reports worsening neck pain with radiation into her left arm down to her hand with cramping and burning. This has been severe over the past week. Last cervical CT in 2019, she reports she has sustained multiple falls since then. She also reports worsening symptoms down her left leg, interfering with her ability to walk at times due to significant weakness.      Interval history 10/04/2022  Ms. Felix is a 76-year-old female with past medical history significant for cerebral aneurysm status post craniotomy and repair, cerebral vascular accident, left V1 distribution post herpetic neuralgia, depression, glaucoma, asthma/COPD, coronary artery disease, GERD, nicotine dependence, type 2 diabetes who presents to establish care, previous Dr. Dias and Dr. Fluton patient.  Today patient reports pain in the lower back and legs.  Pain is constant today is rated 7/10.  Patient reports pain in a bandlike distribution in the lower back which radiates down the posterior aspects of bilateral lower extremities in L5-S1 distribution to mid thigh.  Pain is described as burning and aching in nature.  Pain is exacerbated when moving from sitting to standing and standing and ambulating just a few feet.  Patient does report associated weakness in the lower extremities associated with her pain.  Patient reports she is able to ambulate with assistive device, walker only a few feet before requiring rest.  Pain has been improved with prior procedures, including medial branch block and transforaminal epidural steroid injection.  Patient is interested in pursuing repeat injection.  Of note patient has trialed medicinal marijuana with Dr. Mckeon and reports palpitations side effect.  Patient has discontinued tramadol, tizanidine and gabapentin.  Pain is improved with prednisone which she takes prescribed by  her pulmonologist.    Interval history:  Dr. Fulton:  05/10/2021-05/06/2022  Shireen Felix is a 76 y.o. female  who is presenting with a chief complaint of Neck Pain. The patient began experiencing this problem insidiously, and the pain has been gradually worsening over the past 6 month(s). The pain is described as throbbing, cramping, aching and heavy and is located in the bilateral cervical spine. Pain is intermittent and lasts hours. The  pain is nonradiating. The patient rates her pain a 7 out of ten and interferes with activities of daily living a 7 out of ten. Pain is exacerbated by rotation of the cervical spine, and is improved by rest. Patient reports no prior trauma, no prior spinal surgery      This patient is a 74 y.o. female who presents today complaining of the above noted pain/s. The patient describes the pain as follows.  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  She reports approximately 2 years ago she had to sudden deaths in the family which caused very stressful time for her and resulted in shingles rash in the left V1 distribution however she reports having excruciating pain in the left V1 and V2 distributions.  She denies having difficulty with eating food and brushing her teeth on the left side of her face however the left lateral side of her nose gets her excruciating pain.  She has been tried on numerous medications in the past including gabapentin, Lyrica, Valtrex, Celebrex, ibuprofen which have all provided minimal benefit however steroids have been most helpful.  Today she rates her pain as an 4/10 and describes a constant burning sensation the left side of her face in addition to radiation into her bilateral lower extremities, her right shoulder, her left upper extremity occasionally as a pins and needle sensation.  She does report having numbness  and weakness in her left lower extremity.  She has been physical therapy which she completed in February of 2019 however this caused most of her low back and leg symptoms to worsen in addition to her post herpetic neuralgia to worsen.  She denies having bowel bladder difficulties.  Her symptoms are worse with activity such as walking in her somewhat improved with rest however she had finds it difficult to get into a comfortable position. She has been using topical lidocaine patches and applying to the left side of her face which does provide significant benefit.  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     Shireen Felix is a 76 y.o. female  who is presenting with a chief complaint of lumbar spine. The patient began experiencing this problem insidiously, and the pain has been gradually worsening over the past 2 year(s). The pain is described as throbbing, shooting, burning, aching and electrical and is located in the bilateral lumbar spine. Pain is intermittent and lasts hours. The pain radiates to bilateral lower extremities. The patient rates her pain a 7 out of ten and interferes with activities of daily living a 6 out of ten. Pain is exacerbated by flexion of the lumbar spine, and is improved by rest. Patient reports no prior trauma, no prior spinal surgery     Interval HPI 06/03/2020: Dr. Dias:  Ms. Felix returns to clinic for follow-up.  She underwent a T12-L1 transforaminal epidural steroid injection on March 10, 2020 and she reports that this has provided significant pain relief for her which radiated into her right leg prior to the injection.  She does report that his symptoms have returned and the injections worn off her pain is currently rated 7/10.  She was scheduled to undergo bilateral lumbar radiofrequency ablation prior to corona virus however she would  like to hold off on that at this time and pursue repeat injection in the low back.  Unfortunately she also reports that she had her wheelchair fall over a few weeks ago and this has resulted in bilateral shoulder pain.  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  She finds that rest does provide some pain relief on activity causes her symptoms to worsen.  She has been able to walk significantly more after her most recent injection in March reported she is able to walk several steps before having to sit rest however this has a vast improvement from prior to the injection.    Interval HPI:  02/12/2020; Dr. Dias  Ms. Felix returns to clinic for follow-up.  She underwent bilateral lumbar medial branch blocks on January 31, 2020 and reports 100% symptomatic pain relief for approximately 1 week and his symptoms slowly began to return.  She continues to have some right-sided lower thoracic posterior pain which was not completely dressed with the medial branch blocks.  Today she rates her pain as an 8/10 which is located primarily in the axial lumbar spine and the lower right posterior thoracic region.  She denies having numbness weakness in the legs but does continue to have a constant aching and throbbing sensation in the low back.    Interval HPI 01/23/2020: Dr. Dias: Ms. Felix returns to clinic for follow-up.  She reports that she underwent bilateral L4/5 transforaminal epidural steroid injections in November 2019 reports ongoing 80% symptomatic pain relief.  She has also been participating physical therapy which she has found to be very helpful however 4 days ago she started to do some leg raise is in her bed and she felt severe pain in her low back which has not subsided.  She denies having any radiation except for around her flank into her anterior abdomen.  She feels like the pain sometimes comes straight  through from her back to her abdomen.  Today she rates her pain as a 10/10 describes it as a constant aching and sharp pain. She has been using a heating pad which is minimally helpful and she has been holding off on physical therapy at this time. She denies having bowel bladder difficulty.     Interval History: Patient was seen on 8/4/19. At that time she underwent bilateral L3/4 TF XANDER.  The patient reports that she is/was better following the procedure.  she reports 75% pain relief.  She had a fall, then pain increased. She is currently living at [facility]. She is doing at home therapy there, which is helping. She is in wheelchair now but hopes to transition to walker soon.  After the fall, she was not able to get out of the bed.      Initial History of Present Illness:  Dr. Dias  This patient is a 74 y.o. female who presents today complaining of the above noted pain/s. The patient describes the pain as follows.  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  She reports approximately 2 years ago she had to sudden deaths in the family which caused very stressful time for her and resulted in shingles rash in the left V1 distribution however she reports having excruciating pain in the left V1 and V2 distributions.  She denies having difficulty with eating food and brushing her teeth on the left side of her face however the left lateral side of her nose gets her excruciating pain.  She has been tried on numerous medications in the past including gabapentin, Lyrica, Valtrex, Celebrex, ibuprofen which have all provided minimal benefit however steroids have been most helpful.  Today she rates her pain as an 8/10 and describes a constant burning sensation the left side of her face in addition to radiation into her bilateral lower extremities, her right shoulder, her left upper extremity  occasionally as a pins and needle sensation.  She does report having numbness and weakness in her left lower extremity.  She has been physical therapy which she completed in February of 2019 however this caused most of her low back and leg symptoms to worsen in addition to her post herpetic neuralgia to worsen.  She denies having bowel bladder difficulties.  Her symptoms are worse with activity such as walking in her somewhat improved with rest however she had finds it difficult to get into a comfortable position. She has been using topical lidocaine patches and applying to the left side of her face which does provide significant benefit.  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    - pertinent negatives: No fever, No chills, No weight loss, No bladder dysfunction, No bowel dysfunction, No saddle anesthesia  - pertinent positives: generalized nonspecific Lower Extremity weakness bilaterally    - medications, other therapies tried (physical therapy, injections):     >> NSAIDs, Tylenol, Tramadol, Norco, gabapentin, lyrica, flexeril and medrol dose pack    >> Has previously undergone Physical Therapy      Pain injections:    Dr. Oshea:  -10/25/2023: Sacrococcygeal joint injection with 80% relief  -07/28/2023: Bilateral sacroiliac joint and greater trochanteric bursa injection  -04/26/2023: bilateral L4/5 TFESI with 80% relief      -11/16/2021: Right-sided SI joint and greater trochanteric bursa injection; Dr. Fulton  -07/09/2020: Bilateral glenohumeral joint injection; Dr. Dias  -06/19/2020: Right-sided T12/L1 transforaminal epidural steroid injection; Dr. Dias  -03/10/2020:  T12/L1 transforaminal epidural steroid injection; Dr. Dias  -01/31/2020: Bilateral L3-5 medial branch blocks; Dr. Dias  -11/12/2019: Bilateral L4/5 transforaminal epidural steroid injection;   [facility]      Imaging / Labs / Studies (reviewed on 11/8/2023):    Results for orders placed during the hospital encounter of 10/11/23    X-Ray Cervical Spine 5 View W Flex Extxt    Narrative  EXAM: XR CERVICAL SPINE 5 VIEW WITH FLEX AND EXT    CLINICAL HISTORY: Cervical Spondylosis; [M47.812]-Spondylosis without myelopathy or radiculopathy, cervical region.    TECHNIQUE: Frontal, lateral, flexion, extension, odontoid, and oblique views of the cervical spine.    COMPARISON: None available.    FINDINGS:  There is no acute fracture or compression deformity. Bones are demineralized. No aggressive lytic or blastic lesion seen.  Mild cervical dextrocurvature noted.  There is mild straightening of the normal cervical lordosis as well as mild anterolisthesis of C4 on C5.  No evidence of instability with flexion/extension.  Remaining alignment is unremarkable.  There is multilevel loss of intervertebral disc height with associated uncovertebral hypertrophy and endplate ossified ptosis, most prominent at C5-C6 and C6-C7.  More mild degenerative disc changes at C3-C4 and C4-C5. Multilevel bilateral facet arthropathy also noted. No gross bony spinal canal stenosis.  Moderate to severe left neuroforaminal stenosis at the C5-C6 and C6-C7 levels.  Additional mild neuroforaminal stenosis at the mid to upper cervical levels.  Left carotid calcifications noted.  Visualized soft tissues are otherwise unremarkable.    Impression  Mild cervical thoracic curvature and straightening of the normal cervical lordosis.  Mild anterolisthesis of C4 on C5 without evidence of instability.  Osteopenia and multilevel degenerative changes as above.    Finalized on: 10/11/2023 12:48 PM By:  Elie Amor MD  [facility]# [id number]      2023-10-11 12:50:[id number]    [facility]    Sacrum/Coccyx X-rays:  EXAM: XR SACRUM AND COCCYX   HISTORY: Pain   FINDINGS:   3 views were obtained of the sacrum/coccyx.   Bilateral hip replacement hardware.  Degenerative changes within  the visualized lumbar spine.  No visualized fracture.   The bones are osteopenic.      Impression:   No acute findings are identified.      CT Cervical spine 06/23/2023  FINDINGS:  There is unchanged grade 1 anterolisthesis of C3 on C4 and C4 on C5.  There is no new spondylolisthesis.  There is no loss of vertebral body height.  There is multilevel degenerative disc space narrowing most significant at C5-C6 and C6-C7.  There is unchanged diffuse degenerative facet arthropathy with posterior disc osteophyte complexes and uncovertebral osteophytosis resulting in varying degrees of canal and foraminal stenosis.  This is incompletely evaluated on this exam.     The included portions of the posterior fossa are normal.  The craniocervical junction is symmetric.  The atlantoaxial articulation is normal.  The airway is widely patent.  The thyroid gland is normal.  There is no cervical adenopathy.  The visualized lung apices are clear.     Impression:     Multilevel degenerative spondylolisthesis and spondylosis resulting in varying degrees of canal and foraminal stenosis, grossly unchanged from prior exam.    CT lumbar spine 06/23/2023  FINDINGS:  Five non-rib-bearing lumbar type vertebral bodies are present.  There is mild Levoscoliotic curvature of the lumbar spine centered at L3, similar compared to the prior.     With regards to alignment, there is minimal right lateral listhesis of L1 on L2 and L2 on L3.  There is minimal left lateral listhesis of L3 on L4.  No anterolisthesis or retrolisthesis.     There are chronic compression deformities through the superior endplate of T12, inferior endplate of L1 and superior endplate of L2.  Degree of vertebral body height loss is stable since 02/07/2023.  Vertebral body heights from L3-L5 are preserved.     Mild to severe degenerative disc changes are present throughout the lumbar spine, worst at the right half of L3-L4 and L4-L5.  Partially calcified disc bulges are seen at  L2-L3, L3-L4, L4-L5 and L5-S1.  Evaluation for central canal narrowing is limited without intrathecal contrast.  Degrees of central canal stenosis appear worst at L3-L4 and L4-L5.     Hypertrophic facet changes are present throughout the lumbar spine, worst at L3-L4 and L4-L5.     Paraspinal soft tissues appear within normal limits.  Both SI joints appear intact with mild degenerative changes, similar compared to the prior.     Impression:     1. Multilevel degenerative disc and hypertrophic facet changes similar compared to 02/07/2023.  2. Chronic appearing compression deformities at T12, L1 and L2, similar compared to 02/07/2023.  Dense of acute injury.      01/23/20    X-Ray Lumbar Spine Ap And Lateral  FINDINGS:  Levoscoliosis present.  Vertebral body heights stable.  Alignment unchanged without spondylolisthesis.  Similar appearing multilevel degenerative disc height loss and osteophyte findings noted.  Multilevel facet degenerative findings remain.  Aorta iliac atherosclerotic calcification.  Colonic constipation findings present.    6/04/19    X-Ray Cervical Spine AP And Lateral    FINDINGS:  Reversal normal C-spine curvature noted on the lateral view with visualization to the C7-T1 level.  Minimal anterior subluxation C4 on C5 noted.  There is multilevel marginal spurring and varying degrees of loss of disc height throughout the C-spine.  No acute fracture identified in prevertebral soft tissues, C1-2 articulation and odontoid appear within normal limits allowing for positioning.  Minimal vascular calcification identified on the left in the area of the carotid vessels.    EMG 02/24/2023  IMPRESSION  1. ABNORMAL study  2. There is electrodiagnostic evidence of a SEVERE demyelinating and axonal median neuropathy (Carpal tunnel syndrome) across the LEFT wrist, a moderate demyelinating CTS across the RIGHT wrist and a MODERATE-SEVERE demyelinating and axonal ulnar neuropathy (Cubital tunnel syndrome) across the  "RIGHT elbow.  There is an acute on chronic radiculopathy of the LEFT C6 nerve root and a subacute on chronic of the RIGHT C4 and C5 nerve roots and a CHRONIC radiculopathy of bilateral C4-T1 nerve roots    Review of Systems:  CONSTITUTIONAL: patient denies any fever, chills, or weight loss  SKIN: patient denies any rash or itching  RESPIRATORY: patient denies having any shortness of breath  GASTROINTESTINAL: patient denies having any diarrhea, constipation, or bowel incontinence  GENITOURINARY: patient denies having any abnormal bladder function    MUSCULOSKELETAL:  - patient complains of the above noted pain/s (see chief pain complaint)    NEUROLOGICAL:   - pain as above  - strength in Upper and Lower extremities is decreased, BILATERALLY  - sensation in Upper extremities is intact, BILATERALLY  - patient denies any loss of bowel or bladder control      PSYCHIATRIC: patient denies any change in mood    Other:  All other systems reviewed and are negative      Physical Exam:  /70   Pulse 78   Ht 5' 4" (1.626 m)   LMP  (LMP Unknown)   BMI 18.18 kg/m²  (reviewed on 11/8/2023)  Physical Exam    GENERAL: Well appearing, in no acute distress, alert and oriented x3.  PSYCH:  Mood and affect appropriate.  SKIN: Skin color, texture, turgor normal, no rashes or lesions.  HEAD/FACE:  Normocephalic, bruising along right lower jaw, Cranial nerves grossly intact.    PULM: No evidence of respiratory difficulty, symmetric chest rise.  GI:  Soft and non-tender.  Neck: TTP along cervical facet joints, positive facet loading,  pain with flexion, extension, and rotation - right greater than left  BACK: Straight leg raising in the sitting and supine positions is negative to radicular pain. pain to palpation over the facet joints of the lumbar spine or spinous processes.  Reduced range of motion with pain reproduction.  Tenderness to palpation along coccyx- improved since injection.   EXTREMITIES:  Peripheral joint range of " motion is reduced with pain with obvious instability in bilateral lower extremities.  No deformities, edema, or skin discoloration. Good capillary refill.    MUSCULOSKELETAL:  Unable to stand on heels and toes  hip, and knee provocative maneuvers are negative.  There is no pain with palpation over the sacroiliac joints bilaterally.  Gaenslen's, Distraction/Compression and  FABERs test is negative.  Facet loading test is positive bilaterally.   Bilateral upper and lower extremity strength is normal and symmetric.  No atrophy or tone abnormalities are noted.    RIGHT Lower extremity: Hip flexion 5/5, Hip Abduction 5/5, Hip Adduction 5/5, Knee extension 5/5, Knee flexion 5/5, Ankle dorsiflexion5/5, Extensor hallucis longus 5/5, Ankle plantarflexion 5/5  LEFT Lower extremity:  Hip flexion 5/5, Hip Abduction 5/5,Hip Adduction 5/5, Knee extension 5/5, Knee flexion 5/5, Ankle dorsiflexion 5/5, Extensor hallucis longus 5/5, Ankle plantarflexion 5/5  -Normal testing knee (patellar) jerk and ankle (achilles) jerk    NEURO: Bilateral upper and lower extremity coordination and muscle stretch reflexes are physiologic and symmetric. No loss of sensation is noted.  GAIT:  Patient presents in wheelchair today.  Antalgic gait.      Assessment:    Shireen Felix is a 77 y.o. year old female who is presenting with   Encounter Diagnoses   Name Primary?    Hair loss     Sacroiliitis     Cervicalgia     Myofascial muscle pain Yes               Plan:    1. Interventional:  Trigger Point Injections performed in the office today. See procedure note below. Patient tolerated well.   A trigger point injection was performed at the site of maximal tenderness using 1% plain Lidocaine and Depo-Medrol .      - S/p  Sacrococcygeal joint injection with 80% relief on 10/25/2023     Anticoagulation:  Yes, Plavix  Will obtain cardiac clearance from Dr. Dye to pause Plavix 5 days prior to sacrococcygeal ligament injection.      2. Pharmacologic:        -Continue Gabapentin 100 mg BID. We have reviewed potential side effects of this medication including daytime somnolence, weight gain and peripheral edema    -Continue tizanidine 4-8 mg prn pain.We have discussed potential deleterious side effects associated with this medication including  dizziness, drowsiness, dry mouth or tingling sensation in the upper or lower extremities.     -Continue Nabumetone 500 mg BID for inflammation and pain. We have discussed potential deleterious side effects of NSAIDs on the cardiovascular, gastrointestinal and renal systems. We have discussed judicious use of this medication. Pt expresses understanding.       3. Rehabilitative:   -We discussed initiating physical therapy to help manage the patient/s painful condition. The patient was counseled that muscle strengthening will improve the long term prognosis in regards to pain and may also help increase range of motion and mobility. They were told that one of the goals of physical therapy is that they learn how to do the exercises so that they can do them independently at home daily upon completion. The patient's questions were answered and they were agreeable to this course.     DME:  - Patient would benefit from TENs unit to help increase ROM, reduce pain, strengthen, and allow return to ADLs.  Order sent to [facility].     4. Diagnostic:  No new imaging ordered today.     5. Consults: Will place referral to Dermatology to discuss hair loss and itching of the scalp.    6.  Follow up: 3-4 weeks to proceed with TPI of lumbar spine.    Augustin Nichols PA-C  Interventional Pain Medicine      Procedure note:   Trigger Point Injection:   The procedure was discussed with the patient including complications of nerve damage,  bleeding, infection, and failure of pain relief.   Trigger points were identified by palpation and marked. Alcohol swab prep of sites done. A mixture of 4mL 1% lidocaine + 40 mg Depo-Medrol was prepared (5 mL  total).   A 25-gauge needle was advanced to the point of maximal tenderness, and the medication mixture  was injected after negative aspiration. Patient tolerated the procedure well and without complications. Patient was monitored for 15 min following the injection before discharged from the clinic.  Sites injected included:  Upper, middle, lower trapezius (bilateral)- 2 sites on the L side, 3 sites on the R side.    The patient tolerated the procedure well and was sent home in stable condition.  The patient was instructed to apply an ice pack as needed and not to do anything strenuous for the next 48 hours.           The above plan and management options were discussed at length with patient. Patient is in agreement with the above and verbalized understanding.  - I discussed the goals of interventional chronic pain management with the patient on today's visit. We discussed a multimodal and systematic approach to pain.  This includes diagnostic and therapeutic injections, adjuvant pharmacologic treatment, physical therapy, and at times psychiatry.  I emphasized the importance of regular exercise, core strengthening and stretching, diet and weight loss as a cornerstone of long-term pain management.    - This condition does not require this patient to take time off of work, and the primary goal of our Pain Management services is to improve the patient's functional capacity.  - Patient Questions: Answered all of the patient's questions regarding diagnoses, therapy, treatment and next steps    Disclaimer:  This note may have been prepared using voice recognition software, it may have not been extensively proofed, as such there could be errors within the text such as sound alike errors.

## 2023-11-14 ENCOUNTER — LAB VISIT (OUTPATIENT)
Dept: LAB | Facility: HOSPITAL | Age: 78
End: 2023-11-14
Attending: INTERNAL MEDICINE
Payer: MEDICARE

## 2023-11-14 ENCOUNTER — OFFICE VISIT (OUTPATIENT)
Dept: DERMATOLOGY | Facility: CLINIC | Age: 78
End: 2023-11-14
Payer: MEDICARE

## 2023-11-14 DIAGNOSIS — L29.9 PRURITUS: Primary | ICD-10-CM

## 2023-11-14 DIAGNOSIS — D64.9 ANEMIA, UNSPECIFIED TYPE: ICD-10-CM

## 2023-11-14 DIAGNOSIS — D50.8 OTHER IRON DEFICIENCY ANEMIA: ICD-10-CM

## 2023-11-14 DIAGNOSIS — M81.0 AGE-RELATED OSTEOPOROSIS WITHOUT CURRENT PATHOLOGICAL FRACTURE: ICD-10-CM

## 2023-11-14 LAB
ALBUMIN SERPL BCP-MCNC: 3.2 G/DL (ref 3.5–5.2)
ALP SERPL-CCNC: 51 U/L (ref 55–135)
ALT SERPL W/O P-5'-P-CCNC: 16 U/L (ref 10–44)
ANION GAP SERPL CALC-SCNC: 11 MMOL/L (ref 8–16)
AST SERPL-CCNC: 17 U/L (ref 10–40)
BASOPHILS # BLD AUTO: 0.02 K/UL (ref 0–0.2)
BASOPHILS NFR BLD: 0.2 % (ref 0–1.9)
BILIRUB SERPL-MCNC: 0.3 MG/DL (ref 0.1–1)
BUN SERPL-MCNC: 24 MG/DL (ref 8–23)
CALCIUM SERPL-MCNC: 9.5 MG/DL (ref 8.7–10.5)
CHLORIDE SERPL-SCNC: 105 MMOL/L (ref 95–110)
CO2 SERPL-SCNC: 25 MMOL/L (ref 23–29)
CREAT SERPL-MCNC: 0.9 MG/DL (ref 0.5–1.4)
DIFFERENTIAL METHOD: ABNORMAL
EOSINOPHIL # BLD AUTO: 0.1 K/UL (ref 0–0.5)
EOSINOPHIL NFR BLD: 1.5 % (ref 0–8)
ERYTHROCYTE [DISTWIDTH] IN BLOOD BY AUTOMATED COUNT: 14.9 % (ref 11.5–14.5)
EST. GFR  (NO RACE VARIABLE): >60 ML/MIN/1.73 M^2
FERRITIN SERPL-MCNC: 271 NG/ML (ref 20–300)
GLUCOSE SERPL-MCNC: 76 MG/DL (ref 70–110)
HCT VFR BLD AUTO: 33.2 % (ref 37–48.5)
HGB BLD-MCNC: 10.5 G/DL (ref 12–16)
IGA SERPL-MCNC: 440 MG/DL (ref 40–350)
IGG SERPL-MCNC: 1122 MG/DL (ref 650–1600)
IGM SERPL-MCNC: 86 MG/DL (ref 50–300)
IMM GRANULOCYTES # BLD AUTO: 0.04 K/UL (ref 0–0.04)
IMM GRANULOCYTES NFR BLD AUTO: 0.4 % (ref 0–0.5)
IRON SERPL-MCNC: 59 UG/DL (ref 30–160)
LDH SERPL L TO P-CCNC: 194 U/L (ref 110–260)
LYMPHOCYTES # BLD AUTO: 1.4 K/UL (ref 1–4.8)
LYMPHOCYTES NFR BLD: 15.6 % (ref 18–48)
MCH RBC QN AUTO: 30.2 PG (ref 27–31)
MCHC RBC AUTO-ENTMCNC: 31.6 G/DL (ref 32–36)
MCV RBC AUTO: 95 FL (ref 82–98)
MONOCYTES # BLD AUTO: 0.8 K/UL (ref 0.3–1)
MONOCYTES NFR BLD: 8.5 % (ref 4–15)
NEUTROPHILS # BLD AUTO: 6.6 K/UL (ref 1.8–7.7)
NEUTROPHILS NFR BLD: 73.8 % (ref 38–73)
NRBC BLD-RTO: 0 /100 WBC
PLATELET # BLD AUTO: 216 K/UL (ref 150–450)
PMV BLD AUTO: 10.2 FL (ref 9.2–12.9)
POTASSIUM SERPL-SCNC: 3.9 MMOL/L (ref 3.5–5.1)
PROT SERPL-MCNC: 6.9 G/DL (ref 6–8.4)
RBC # BLD AUTO: 3.48 M/UL (ref 4–5.4)
RETICS/RBC NFR AUTO: 1.3 % (ref 0.5–2.5)
SATURATED IRON: 21 % (ref 20–50)
SODIUM SERPL-SCNC: 141 MMOL/L (ref 136–145)
TOTAL IRON BINDING CAPACITY: 287 UG/DL (ref 250–450)
TRANSFERRIN SERPL-MCNC: 194 MG/DL (ref 200–375)
WBC # BLD AUTO: 8.92 K/UL (ref 3.9–12.7)

## 2023-11-14 PROCEDURE — 82728 ASSAY OF FERRITIN: CPT | Performed by: NURSE PRACTITIONER

## 2023-11-14 PROCEDURE — 85045 AUTOMATED RETICULOCYTE COUNT: CPT | Performed by: NURSE PRACTITIONER

## 2023-11-14 PROCEDURE — 84165 PROTEIN E-PHORESIS SERUM: CPT | Performed by: NURSE PRACTITIONER

## 2023-11-14 PROCEDURE — 1100F PTFALLS ASSESS-DOCD GE2>/YR: CPT | Mod: CPTII,S$GLB,, | Performed by: STUDENT IN AN ORGANIZED HEALTH CARE EDUCATION/TRAINING PROGRAM

## 2023-11-14 PROCEDURE — 1159F PR MEDICATION LIST DOCUMENTED IN MEDICAL RECORD: ICD-10-PCS | Mod: CPTII,S$GLB,, | Performed by: STUDENT IN AN ORGANIZED HEALTH CARE EDUCATION/TRAINING PROGRAM

## 2023-11-14 PROCEDURE — 82784 ASSAY IGA/IGD/IGG/IGM EACH: CPT | Mod: 59 | Performed by: NURSE PRACTITIONER

## 2023-11-14 PROCEDURE — 86334 IMMUNOFIX E-PHORESIS SERUM: CPT | Mod: 26,,, | Performed by: PATHOLOGY

## 2023-11-14 PROCEDURE — 83540 ASSAY OF IRON: CPT | Performed by: NURSE PRACTITIONER

## 2023-11-14 PROCEDURE — 99999 PR PBB SHADOW E&M-EST. PATIENT-LVL III: ICD-10-PCS | Mod: PBBFAC,,, | Performed by: STUDENT IN AN ORGANIZED HEALTH CARE EDUCATION/TRAINING PROGRAM

## 2023-11-14 PROCEDURE — 3288F FALL RISK ASSESSMENT DOCD: CPT | Mod: CPTII,S$GLB,, | Performed by: STUDENT IN AN ORGANIZED HEALTH CARE EDUCATION/TRAINING PROGRAM

## 2023-11-14 PROCEDURE — 99214 OFFICE O/P EST MOD 30 MIN: CPT | Mod: S$GLB,,, | Performed by: STUDENT IN AN ORGANIZED HEALTH CARE EDUCATION/TRAINING PROGRAM

## 2023-11-14 PROCEDURE — 99214 PR OFFICE/OUTPT VISIT, EST, LEVL IV, 30-39 MIN: ICD-10-PCS | Mod: S$GLB,,, | Performed by: STUDENT IN AN ORGANIZED HEALTH CARE EDUCATION/TRAINING PROGRAM

## 2023-11-14 PROCEDURE — 1160F PR REVIEW ALL MEDS BY PRESCRIBER/CLIN PHARMACIST DOCUMENTED: ICD-10-PCS | Mod: CPTII,S$GLB,, | Performed by: STUDENT IN AN ORGANIZED HEALTH CARE EDUCATION/TRAINING PROGRAM

## 2023-11-14 PROCEDURE — 84466 ASSAY OF TRANSFERRIN: CPT | Performed by: NURSE PRACTITIONER

## 2023-11-14 PROCEDURE — 84165 PROTEIN E-PHORESIS SERUM: CPT | Mod: 26,,, | Performed by: PATHOLOGY

## 2023-11-14 PROCEDURE — 1160F RVW MEDS BY RX/DR IN RCRD: CPT | Mod: CPTII,S$GLB,, | Performed by: STUDENT IN AN ORGANIZED HEALTH CARE EDUCATION/TRAINING PROGRAM

## 2023-11-14 PROCEDURE — 83010 ASSAY OF HAPTOGLOBIN QUANT: CPT | Performed by: NURSE PRACTITIONER

## 2023-11-14 PROCEDURE — 86334 PATHOLOGIST INTERPRETATION IFE: ICD-10-PCS | Mod: 26,,, | Performed by: PATHOLOGY

## 2023-11-14 PROCEDURE — 80053 COMPREHEN METABOLIC PANEL: CPT | Performed by: INTERNAL MEDICINE

## 2023-11-14 PROCEDURE — 83615 LACTATE (LD) (LDH) ENZYME: CPT | Performed by: NURSE PRACTITIONER

## 2023-11-14 PROCEDURE — 85025 COMPLETE CBC W/AUTO DIFF WBC: CPT | Performed by: NURSE PRACTITIONER

## 2023-11-14 PROCEDURE — 84165 PATHOLOGIST INTERPRETATION SPE: ICD-10-PCS | Mod: 26,,, | Performed by: PATHOLOGY

## 2023-11-14 PROCEDURE — 1100F PR PT FALLS ASSESS DOC 2+ FALLS/FALL W/INJURY/YR: ICD-10-PCS | Mod: CPTII,S$GLB,, | Performed by: STUDENT IN AN ORGANIZED HEALTH CARE EDUCATION/TRAINING PROGRAM

## 2023-11-14 PROCEDURE — 86334 IMMUNOFIX E-PHORESIS SERUM: CPT | Performed by: NURSE PRACTITIONER

## 2023-11-14 PROCEDURE — 1125F AMNT PAIN NOTED PAIN PRSNT: CPT | Mod: CPTII,S$GLB,, | Performed by: STUDENT IN AN ORGANIZED HEALTH CARE EDUCATION/TRAINING PROGRAM

## 2023-11-14 PROCEDURE — 1125F PR PAIN SEVERITY QUANTIFIED, PAIN PRESENT: ICD-10-PCS | Mod: CPTII,S$GLB,, | Performed by: STUDENT IN AN ORGANIZED HEALTH CARE EDUCATION/TRAINING PROGRAM

## 2023-11-14 PROCEDURE — 1159F MED LIST DOCD IN RCRD: CPT | Mod: CPTII,S$GLB,, | Performed by: STUDENT IN AN ORGANIZED HEALTH CARE EDUCATION/TRAINING PROGRAM

## 2023-11-14 PROCEDURE — 83521 IG LIGHT CHAINS FREE EACH: CPT | Performed by: NURSE PRACTITIONER

## 2023-11-14 PROCEDURE — 3288F PR FALLS RISK ASSESSMENT DOCUMENTED: ICD-10-PCS | Mod: CPTII,S$GLB,, | Performed by: STUDENT IN AN ORGANIZED HEALTH CARE EDUCATION/TRAINING PROGRAM

## 2023-11-14 PROCEDURE — 99999 PR PBB SHADOW E&M-EST. PATIENT-LVL III: CPT | Mod: PBBFAC,,, | Performed by: STUDENT IN AN ORGANIZED HEALTH CARE EDUCATION/TRAINING PROGRAM

## 2023-11-14 PROCEDURE — 36415 COLL VENOUS BLD VENIPUNCTURE: CPT | Performed by: INTERNAL MEDICINE

## 2023-11-14 RX ORDER — HYDROXYZINE HYDROCHLORIDE 25 MG/1
25 TABLET, FILM COATED ORAL NIGHTLY
Qty: 30 TABLET | Refills: 2 | Status: SHIPPED | OUTPATIENT
Start: 2023-11-14 | End: 2024-01-09

## 2023-11-14 RX ORDER — FLUOCINONIDE TOPICAL SOLUTION USP, 0.05% 0.5 MG/ML
SOLUTION TOPICAL 2 TIMES DAILY
Qty: 60 ML | Refills: 2 | Status: SHIPPED | OUTPATIENT
Start: 2023-11-14

## 2023-11-14 NOTE — PROGRESS NOTES
Patient Information  Name: Shireen Felix  : 1945  MRN: [id number]     Referring Physician:  Dr. Jordan   Primary Care Physician:  Dr. Chaudhari, Laron Skinner MD   Date of Visit: 2023      Subjective:       Shireen Felix is a 77 y.o. female who presents for   Chief Complaint   Patient presents with    Hair Loss     S/s itching    Spot     On the side of the face ,back and ears. S/s redness, itchness., and burning . X few years.     Patient with new complaint of lesion(s)  Location: scalp, back, ears  Duration: years  Symptoms: itching  Relieving factors/Previous treatments: none    Has hx of shingles of left V1 distribution.    Patient was last seen:Visit date not found     Prior notes by myself reviewed.   Clinical documentation obtained by nursing staff reviewed.    Review of Systems   Skin:  Negative for itching and rash.        Objective:    Physical Exam   Constitutional: She appears well-developed and well-nourished. No distress.   Neurological: She is alert and oriented to person, place, and time. She is not disoriented.   Psychiatric: She has a normal mood and affect.   Skin:   Areas Examined (abnormalities noted in diagram):   Scalp / Hair Palpated and Inspected              Diagram Legend     Erythematous scaling macule/papule c/w actinic keratosis       Vascular papule c/w angioma      Pigmented verrucoid papule/plaque c/w seborrheic keratosis      Yellow umbilicated papule c/w sebaceous hyperplasia      Irregularly shaped tan macule c/w lentigo     1-2 mm smooth white papules consistent with Milia      Movable subcutaneous cyst with punctum c/w epidermal inclusion cyst      Subcutaneous movable cyst c/w pilar cyst      Firm pink to brown papule c/w dermatofibroma      Pedunculated fleshy papule(s) c/w skin tag(s)      Evenly pigmented macule c/w junctional nevus     Mildly variegated pigmented, slightly irregular-bordered macule c/w mildly atypical nevus      Flesh colored to evenly  pigmented papule c/w intradermal nevus       Pink pearly papule/plaque c/w basal cell carcinoma      Erythematous hyperkeratotic cursted plaque c/w SCC      Surgical scar with no sign of skin cancer recurrence      Open and closed comedones      Inflammatory papules and pustules      Verrucoid papule consistent consistent with wart     Erythematous eczematous patches and plaques     Dystrophic onycholytic nail with subungual debris c/w onychomycosis     Umbilicated papule    Erythematous-base heme-crusted tan verrucoid plaque consistent with inflamed seborrheic keratosis     Erythematous Silvery Scaling Plaque c/w Psoriasis     See annotation      No images are attached to the encounter or orders placed in the encounter.    [] Data reviewed  [] Independent review of test  [] Management discussed with another provider    Assessment / Plan:        Pruritus  -     fluocinonide (LIDEX) 0.05 % external solution; Apply topically 2 (two) times daily. Use on itchy spots on scalp and ear  Dispense: 60 mL; Refill: 2  -     hydrOXYzine HCL (ATARAX) 25 MG tablet; Take 1 tablet (25 mg total) by mouth every evening. Prn pruritus. Do not drive or operate machinery while taking this medicine.  Dispense: 30 tablet; Refill: 2  - Consider referral to pain management for nerve block if no improvement at next visit             LOS NUMBER AND COMPLEXITY OF PROBLEMS    COMPLEXITY OF DATA RISK TOTAL TIME (m)   06735  69756 [] 1 self-limited or minor problem [x] Minimal to none [] No treatment recommended or patient to monitor 15-29  10-19   65977  23382 Low  [] 2 or > self limited or minor problems  [] 1 stable chronic illness  [] 1 acute, uncomplicated illness or injury Limited (2)  [] Prior external notes from each unique source  [] Review result of each unique test  [] Order each unique test []  Low  OTC medications, minor skin biopsy 30-44  20-29   69188  16893 Moderate  [x]  1 or > chronic illness with progression, exacerbation or SE  of treatment  []  2 or more stable chronic illnesses  []  1 acute illness with systemic symptoms  []  1 acute complicated injury  []  1 undiagnosed new problem with uncertain prognosis Moderate (1/3 below)  []  3 or more data items        *Now includes assessment requiring independent historian  []  Independent interpretation of a test  []  Discuss management/test with another provider Moderate  [x]  Prescription drug mgmt  []  Minor surgery with risk discussed  []  Mgmt limited by social determinates 45-59  30-39   84883  32559 High  []  1 or more chronic illness with severe exacerbation, progression or SE of treatment  []  1 acute or chronic illness/injury that poses a threat to life or bodily function Extensive (2/3 below)  []  3 or more data items        *Now includes assessment requiring independent historian.  []  Independent interpretation of a test  []  Discuss management/test with another provider High  []  Major surgery with risk discussed  []  Drug therapy requiring intensive monitoring for toxicity  []  Hospitalization  []  Decision for DNR 60-74  40-54      No follow-ups on file.    Pham Erickson MD, FAAD  [facility]

## 2023-11-15 LAB
ALBUMIN SERPL ELPH-MCNC: 3.28 G/DL (ref 3.35–5.55)
ALPHA1 GLOB SERPL ELPH-MCNC: 0.32 G/DL (ref 0.17–0.41)
ALPHA2 GLOB SERPL ELPH-MCNC: 0.86 G/DL (ref 0.43–0.99)
B-GLOBULIN SERPL ELPH-MCNC: 0.83 G/DL (ref 0.5–1.1)
GAMMA GLOB SERPL ELPH-MCNC: 1.02 G/DL (ref 0.67–1.58)
HAPTOGLOB SERPL-MCNC: 187 MG/DL (ref 30–250)
INTERPRETATION SERPL IFE-IMP: NORMAL
KAPPA LC SER QL IA: 5.9 MG/DL (ref 0.33–1.94)
KAPPA LC/LAMBDA SER IA: 2.03 (ref 0.26–1.65)
LAMBDA LC SER QL IA: 2.9 MG/DL (ref 0.57–2.63)
PROT SERPL-MCNC: 6.3 G/DL (ref 6–8.4)

## 2023-11-16 LAB
PATHOLOGIST INTERPRETATION IFE: NORMAL
PATHOLOGIST INTERPRETATION SPE: NORMAL

## 2023-11-21 ENCOUNTER — TELEPHONE (OUTPATIENT)
Dept: PAIN MEDICINE | Facility: CLINIC | Age: 78
End: 2023-11-21
Payer: MEDICARE

## 2023-11-21 NOTE — TELEPHONE ENCOUNTER
Reach out to pt to give her another number [phone] per  Griselda Florentino for her tens unit.

## 2023-11-21 NOTE — TELEPHONE ENCOUNTER
Reach out to pt from the messages. Pt want to confirmed appt for Friday.     Also gave her  Griselda Florentino number  at [phone] in regards of her tens unit.

## 2023-11-21 NOTE — TELEPHONE ENCOUNTER
----- Message from Selene Cavanaugh sent at 11/21/2023  9:27 AM CST -----  Contact: Shireen  Patient is calling to speak with someone regarding visit.. Patient request to be informed if injection will be taken in neck or back and request to discuss pain reliever. Please give patient a call back at [phone] to discuss further.  Thank you,  GH

## 2023-11-22 ENCOUNTER — TELEPHONE (OUTPATIENT)
Dept: PAIN MEDICINE | Facility: CLINIC | Age: 78
End: 2023-11-22
Payer: MEDICARE

## 2023-11-24 ENCOUNTER — TELEPHONE (OUTPATIENT)
Dept: PAIN MEDICINE | Facility: CLINIC | Age: 78
End: 2023-11-24
Payer: MEDICARE

## 2023-11-24 ENCOUNTER — OFFICE VISIT (OUTPATIENT)
Dept: PAIN MEDICINE | Facility: CLINIC | Age: 78
End: 2023-11-24
Payer: MEDICARE

## 2023-11-24 VITALS
SYSTOLIC BLOOD PRESSURE: 151 MMHG | WEIGHT: 109.69 LBS | HEIGHT: 64 IN | BODY MASS INDEX: 18.72 KG/M2 | HEART RATE: 86 BPM | DIASTOLIC BLOOD PRESSURE: 67 MMHG

## 2023-11-24 DIAGNOSIS — M50.30 DDD (DEGENERATIVE DISC DISEASE), CERVICAL: ICD-10-CM

## 2023-11-24 DIAGNOSIS — M47.812 CERVICAL SPONDYLOSIS: ICD-10-CM

## 2023-11-24 DIAGNOSIS — M54.12 CERVICAL RADICULOPATHY: Primary | ICD-10-CM

## 2023-11-24 DIAGNOSIS — M19.011 OSTEOARTHRITIS OF RIGHT SHOULDER, UNSPECIFIED OSTEOARTHRITIS TYPE: ICD-10-CM

## 2023-11-24 DIAGNOSIS — M79.18 LUMBAR MUSCLE PAIN: ICD-10-CM

## 2023-11-24 DIAGNOSIS — M19.019 SHOULDER ARTHRITIS: ICD-10-CM

## 2023-11-24 PROCEDURE — 3078F PR MOST RECENT DIASTOLIC BLOOD PRESSURE < 80 MM HG: ICD-10-PCS | Mod: CPTII,S$GLB,, | Performed by: PHYSICIAN ASSISTANT

## 2023-11-24 PROCEDURE — 1101F PR PT FALLS ASSESS DOC 0-1 FALLS W/OUT INJ PAST YR: ICD-10-PCS | Mod: CPTII,S$GLB,, | Performed by: PHYSICIAN ASSISTANT

## 2023-11-24 PROCEDURE — 3288F PR FALLS RISK ASSESSMENT DOCUMENTED: ICD-10-PCS | Mod: CPTII,S$GLB,, | Performed by: PHYSICIAN ASSISTANT

## 2023-11-24 PROCEDURE — 1101F PT FALLS ASSESS-DOCD LE1/YR: CPT | Mod: CPTII,S$GLB,, | Performed by: PHYSICIAN ASSISTANT

## 2023-11-24 PROCEDURE — 99213 OFFICE O/P EST LOW 20 MIN: CPT | Mod: 25,S$GLB,, | Performed by: PHYSICIAN ASSISTANT

## 2023-11-24 PROCEDURE — 99999 PR PBB SHADOW E&M-EST. PATIENT-LVL V: CPT | Mod: PBBFAC,,, | Performed by: PHYSICIAN ASSISTANT

## 2023-11-24 PROCEDURE — 1159F PR MEDICATION LIST DOCUMENTED IN MEDICAL RECORD: ICD-10-PCS | Mod: CPTII,S$GLB,, | Performed by: PHYSICIAN ASSISTANT

## 2023-11-24 PROCEDURE — 99213 PR OFFICE/OUTPT VISIT, EST, LEVL III, 20-29 MIN: ICD-10-PCS | Mod: 25,S$GLB,, | Performed by: PHYSICIAN ASSISTANT

## 2023-11-24 PROCEDURE — 1125F PR PAIN SEVERITY QUANTIFIED, PAIN PRESENT: ICD-10-PCS | Mod: CPTII,S$GLB,, | Performed by: PHYSICIAN ASSISTANT

## 2023-11-24 PROCEDURE — 99999 PR PBB SHADOW E&M-EST. PATIENT-LVL V: ICD-10-PCS | Mod: PBBFAC,,, | Performed by: PHYSICIAN ASSISTANT

## 2023-11-24 PROCEDURE — 3077F PR MOST RECENT SYSTOLIC BLOOD PRESSURE >= 140 MM HG: ICD-10-PCS | Mod: CPTII,S$GLB,, | Performed by: PHYSICIAN ASSISTANT

## 2023-11-24 PROCEDURE — 1159F MED LIST DOCD IN RCRD: CPT | Mod: CPTII,S$GLB,, | Performed by: PHYSICIAN ASSISTANT

## 2023-11-24 PROCEDURE — 3078F DIAST BP <80 MM HG: CPT | Mod: CPTII,S$GLB,, | Performed by: PHYSICIAN ASSISTANT

## 2023-11-24 PROCEDURE — 3288F FALL RISK ASSESSMENT DOCD: CPT | Mod: CPTII,S$GLB,, | Performed by: PHYSICIAN ASSISTANT

## 2023-11-24 PROCEDURE — 20553 PR INJECT TRIGGER POINTS, > 3: ICD-10-PCS | Mod: S$GLB,,, | Performed by: PHYSICIAN ASSISTANT

## 2023-11-24 PROCEDURE — 1125F AMNT PAIN NOTED PAIN PRSNT: CPT | Mod: CPTII,S$GLB,, | Performed by: PHYSICIAN ASSISTANT

## 2023-11-24 PROCEDURE — 20553 NJX 1/MLT TRIGGER POINTS 3/>: CPT | Mod: S$GLB,,, | Performed by: PHYSICIAN ASSISTANT

## 2023-11-24 PROCEDURE — 3077F SYST BP >= 140 MM HG: CPT | Mod: CPTII,S$GLB,, | Performed by: PHYSICIAN ASSISTANT

## 2023-11-24 NOTE — TELEPHONE ENCOUNTER
----- Message from Vonda Harris sent at 11/24/2023 10:11 AM CST -----  States she needs to pay for her can unit. Please call pt [phone]. Thank you

## 2023-11-24 NOTE — TELEPHONE ENCOUNTER
Spoke with patient, she needed the number for DME to pay for her TENS unit.  I gave her the number to call.

## 2023-11-24 NOTE — PROGRESS NOTES
Established Patient Interventional Pain Clinic Visit    Chief Pain Complaint:  Chief Complaint   Patient presents with    Low-back Pain     Radiates down into the legs    Shoulder Pain     right       Interval History (11/24/2023):  Shireen Felix presents today for follow-up visit.  Patient was last seen on 11/8/2023. At that visit, the plan was to perform TPI on upper back and return to clinic 3-4 weeks for TPI on lower back. Patient reports pain as 8/10 today.  Patient reports previous TPI did help but she continues to have pain on R side of upper back, bilateral arm pain and numbness/tingling in both hands. She also has pain involving R shoulder joint.     Interval History (11/08/2023):  Shireen Felix presents today for follow-up visit.  she underwent sacrococcygeal joint injection on 10/25/23.  The patient reports that she is/was better following the procedure.  she reports 80% pain relief.  The changes lasted so far.  The changes have continued through this visit. The patient now c/o pain related to myofascial pain in upper back. She also pain in lower back (lumbar paraspinal) but the upper back pain is most concerning at this time.  Patient reports pain as 8/10 today.  The patient also c/o itching in her scalp with hair loss over the past two months.    Interval history 10/11/2023  Patient presents status post bilateral sacroiliac joint and greater trochanteric bursa injection 07/28/2023.  Patient reports approximately 90% sustained relief overlying bilateral sacroiliac joint and GT bursa.  Today she presents following a mechanical fall.  Patient reports approximately 5 days prior going to the bathroom in the middle of the night, losing her balance and hitting the back of her head and lower back.  Today she reports pain which is constant which is rated an 8/10.  Patient reports pain along bilateral cervical paraspinous musculature which is exacerbated with cervical flexion/extension and lateral  flexion.  She reports this pain has improved over the last few days.  Her primary concern is pain along her coccyx.  Patient reports tenderness to touch in pain with sitting as well as sitting or standing.  Patient is requesting x-rays for evaluation.  She also would like to restart physical therapy.  Of note she is going to [facility] in [place] and would like to restart dry needling, soft massage and assistance with balance.    Interval History (6/27/2023):  Shireen Felix presents today for follow-up visit.  Patient was last seen on 5/16/2023. She reports some improvement in her pain with TPIs given at last visit. She reports continued variations in her BP throughout the day. She has followed up with Neurology who has made changes to her seizure medications. Seems to be tolerating med change well. Continues to monitor BP. Has followed up with PCP and other specialist to rule out other causes for BP changes (renal, intracranial, etc).  Patient reports pain as 7/10 today. No changes on CT compared to previous.  Interval History (6/14/2023):  Shireen Felix presents today for trigger point injections.  Patient was last seen on 06/09/2023. Patient reports pain as 8/10 today. She continues to report pain as primarily located in her lower lumbosacral region with radiation into her buttocks, right worse than left, and wrapping around her lateral hips. She reports additional falls since last visit. She reports worsening neck pain with radiation into her left arm down to her hand with cramping and burning.  She also reports worsening symptoms down both legs.  interfering with her ability to walk at times due to significant weakness. She reports repeated falls due to her legs giving out from underneath her unexpectedly.       Interval History (6/9/2023):  Shireen Felix presents today via telemed for follow-up visit.  Patient was last seen on 05/16/2023. Last injection bilateral L4/5 TFESI on 4/26/23 with 80%  relief. Patient reports pain as 10/10 today. She reports pain that began over the past several weeks, became severe 2 days ago. She reports pain is primarily located in her lower lumbosacral region with radiation into her buttocks, right worse than left, and wrapping around her lateral hips. Pain is similar to that prior to SIJ and GT bursa injection, last SIJ injection 11/16/2021 with excellent relief until a few weeks ago when pain returned. She reports in the past she has also received trigger point injections in her lower back that were very helpful.    Interval History (5/16/2023): Shireen Felix presents today for follow-up visit.  she underwent bilateral L4/5 TFESI on 4/26/23.  The patient reports that she is/was better following the procedure.  she reports 80% pain relief.  The changes lasted 4 weeks so far.  The changes have continued through this visit.  Patient reports pain as 7/10 today. She reports the procedure itself was very painful. Reports she did not feel sedated at all compared to previous procedures with other providers. She is not interested in pursuing additional procedures secondary to the painful procedure.  She reports worsening neck pain with radiation into her right arm down to her hand with cramping and burning.    Interval History (3/9/2023):  Shireen Felix presents today for follow-up visit for CT and EMG review.  Patient was last seen on 2/1/2023. At that visit, the plan was to order updated CT of her cervical and lumbar spine as well as EMG of her bilateral upper extremities. Patient reports pain as 7/10 today.  She reports worsening neck pain with radiation into her left arm down to her hand with cramping and burning.  She also reports worsening symptoms down both legs.  interfering with her ability to walk at times due to significant weakness. Has an appointment with Dr. Navarro on 03/21/2023. Patient fell hit head on concrete 2-11-23, and had a seizure shortly after.   She reports repeated falls due to her legs giving out from underneath her unexpectedly. Referred to Neurology.  She is also currently on antibiotics secondary to having 10 teeth pulled     Interval History (1/31/2023):  Shireen Felix presents today for follow-up visit.  Patient was last seen on 10/4/2022. At that visit, the plan was to schedule bilateral L3-5 MBB followed 2 weeks later by XANDER.  She canceled these procedures and reports at this time she is not interested in scheduling.  She reports she has a  and goes to the gym 3-4 times per week which includes walking on a treadmill.   Patient reports pain as 7/10 today. She reports worsening neck pain with radiation into her left arm down to her hand with cramping and burning. This has been severe over the past week. Last cervical CT in 2019, she reports she has sustained multiple falls since then. She also reports worsening symptoms down her left leg, interfering with her ability to walk at times due to significant weakness.      Interval history 10/04/2022  Ms. Felix is a 76-year-old female with past medical history significant for cerebral aneurysm status post craniotomy and repair, cerebral vascular accident, left V1 distribution post herpetic neuralgia, depression, glaucoma, asthma/COPD, coronary artery disease, GERD, nicotine dependence, type 2 diabetes who presents to establish care, previous Dr. Dias and Dr. Fulton patient.  Today patient reports pain in the lower back and legs.  Pain is constant today is rated 7/10.  Patient reports pain in a bandlike distribution in the lower back which radiates down the posterior aspects of bilateral lower extremities in L5-S1 distribution to mid thigh.  Pain is described as burning and aching in nature.  Pain is exacerbated when moving from sitting to standing and standing and ambulating just a few feet.  Patient does report associated weakness in the lower extremities associated with her pain.   Patient reports she is able to ambulate with assistive device, walker only a few feet before requiring rest.  Pain has been improved with prior procedures, including medial branch block and transforaminal epidural steroid injection.  Patient is interested in pursuing repeat injection.  Of note patient has trialed medicinal marijuana with Dr. Mckeon and reports palpitations side effect.  Patient has discontinued tramadol, tizanidine and gabapentin.  Pain is improved with prednisone which she takes prescribed by her pulmonologist.    Interval history:  Dr. Fulton:  05/10/2021-05/06/2022  Shireen Felix is a 76 y.o. female  who is presenting with a chief complaint of Neck Pain. The patient began experiencing this problem insidiously, and the pain has been gradually worsening over the past 6 month(s). The pain is described as throbbing, cramping, aching and heavy and is located in the bilateral cervical spine. Pain is intermittent and lasts hours. The  pain is nonradiating. The patient rates her pain a 7 out of ten and interferes with activities of daily living a 7 out of ten. Pain is exacerbated by rotation of the cervical spine, and is improved by rest. Patient reports no prior trauma, no prior spinal surgery      This patient is a 74 y.o. female who presents today complaining of the above noted pain/s. The patient describes the pain as follows.  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  She reports approximately 2 years ago she had to sudden deaths in the family which caused very stressful time for her and resulted in shingles rash in the left V1 distribution however she reports having excruciating pain in the left V1 and V2 distributions.  She denies having difficulty with eating food and brushing her teeth on the left side of her face however the left lateral side of her  nose gets her excruciating pain.  She has been tried on numerous medications in the past including gabapentin, Lyrica, Valtrex, Celebrex, ibuprofen which have all provided minimal benefit however steroids have been most helpful.  Today she rates her pain as an 4/10 and describes a constant burning sensation the left side of her face in addition to radiation into her bilateral lower extremities, her right shoulder, her left upper extremity occasionally as a pins and needle sensation.  She does report having numbness and weakness in her left lower extremity.  She has been physical therapy which she completed in February of 2019 however this caused most of her low back and leg symptoms to worsen in addition to her post herpetic neuralgia to worsen.  She denies having bowel bladder difficulties.  Her symptoms are worse with activity such as walking in her somewhat improved with rest however she had finds it difficult to get into a comfortable position. She has been using topical lidocaine patches and applying to the left side of her face which does provide significant benefit.  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     Shireen Felix is a 76 y.o. female  who is presenting with a chief complaint of lumbar spine. The patient began experiencing this problem insidiously, and the pain has been gradually worsening over the past 2 year(s). The pain is described as throbbing, shooting, burning, aching and electrical and is located in the bilateral lumbar spine. Pain is intermittent and lasts hours. The pain radiates to bilateral lower extremities. The patient rates her pain a 7 out of ten and interferes with activities of daily living a 6 out of ten. Pain is exacerbated by flexion of the lumbar spine, and is improved by rest. Patient reports no prior trauma, no prior spinal surgery      Interval HPI 06/03/2020: Dr. Dias:  Ms. Felix returns to clinic for follow-up.  She underwent a T12-L1 transforaminal epidural steroid injection on March 10, 2020 and she reports that this has provided significant pain relief for her which radiated into her right leg prior to the injection.  She does report that his symptoms have returned and the injections worn off her pain is currently rated 7/10.  She was scheduled to undergo bilateral lumbar radiofrequency ablation prior to corona virus however she would like to hold off on that at this time and pursue repeat injection in the low back.  Unfortunately she also reports that she had her wheelchair fall over a few weeks ago and this has resulted in bilateral shoulder pain.  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  She finds that rest does provide some pain relief on activity causes her symptoms to worsen.  She has been able to walk significantly more after her most recent injection in March reported she is able to walk several steps before having to sit rest however this has a vast improvement from prior to the injection.    Interval HPI:  02/12/2020; Dr. Dias  Ms. Felix returns to clinic for follow-up.  She underwent bilateral lumbar medial branch blocks on January 31, 2020 and reports 100% symptomatic pain relief for approximately 1 week and his symptoms slowly began to return.  She continues to have some right-sided lower thoracic posterior pain which was not completely dressed with the medial branch blocks.  Today she rates her pain as an 8/10 which is located primarily in the axial lumbar spine and the lower right posterior thoracic region.  She denies having numbness weakness in the legs but does continue to have a constant aching and throbbing sensation in the low back.    Interval HPI 01/23/2020: Dr. Dias: Ms. Felix returns to clinic for  follow-up.  She reports that she underwent bilateral L4/5 transforaminal epidural steroid injections in November 2019 reports ongoing 80% symptomatic pain relief.  She has also been participating physical therapy which she has found to be very helpful however 4 days ago she started to do some leg raise is in her bed and she felt severe pain in her low back which has not subsided.  She denies having any radiation except for around her flank into her anterior abdomen.  She feels like the pain sometimes comes straight through from her back to her abdomen.  Today she rates her pain as a 10/10 describes it as a constant aching and sharp pain. She has been using a heating pad which is minimally helpful and she has been holding off on physical therapy at this time. She denies having bowel bladder difficulty.     Interval History: Patient was seen on 8/4/19. At that time she underwent bilateral L3/4 TF XANDER.  The patient reports that she is/was better following the procedure.  she reports 75% pain relief.  She had a fall, then pain increased. She is currently living at [facility]. She is doing at home therapy there, which is helping. She is in wheelchair now but hopes to transition to walker soon.  After the fall, she was not able to get out of the bed.      Initial History of Present Illness:  Dr. Dias  This patient is a 74 y.o. female who presents today complaining of the above noted pain/s. The patient describes the pain as follows.  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  She reports approximately 2 years ago she had to sudden deaths in the family which caused very stressful time for her and resulted in shingles rash in the left V1 distribution however she reports having excruciating pain in the left V1 and V2 distributions.  She denies having difficulty with eating food and  brushing her teeth on the left side of her face however the left lateral side of her nose gets her excruciating pain.  She has been tried on numerous medications in the past including gabapentin, Lyrica, Valtrex, Celebrex, ibuprofen which have all provided minimal benefit however steroids have been most helpful.  Today she rates her pain as an 8/10 and describes a constant burning sensation the left side of her face in addition to radiation into her bilateral lower extremities, her right shoulder, her left upper extremity occasionally as a pins and needle sensation.  She does report having numbness and weakness in her left lower extremity.  She has been physical therapy which she completed in February of 2019 however this caused most of her low back and leg symptoms to worsen in addition to her post herpetic neuralgia to worsen.  She denies having bowel bladder difficulties.  Her symptoms are worse with activity such as walking in her somewhat improved with rest however she had finds it difficult to get into a comfortable position. She has been using topical lidocaine patches and applying to the left side of her face which does provide significant benefit.  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    - pertinent negatives: No fever, No chills, No weight loss, No bladder dysfunction, No bowel dysfunction, No saddle anesthesia  - pertinent positives: generalized nonspecific Lower Extremity weakness bilaterally    - medications, other therapies tried (physical therapy, injections):     >> NSAIDs, Tylenol, Tramadol, Norco, gabapentin, lyrica, flexeril and medrol dose pack    >> Has previously undergone Physical Therapy      Pain injections:    Dr. Oshea:  -10/25/2023: Sacrococcygeal joint injection with 80% relief  -07/28/2023: Bilateral sacroiliac joint and greater trochanteric bursa  injection  -04/26/2023: bilateral L4/5 TFESI with 80% relief      -11/16/2021: Right-sided SI joint and greater trochanteric bursa injection; Dr. Fulton  -07/09/2020: Bilateral glenohumeral joint injection; Dr. Dias  -06/19/2020: Right-sided T12/L1 transforaminal epidural steroid injection; Dr. Dias  -03/10/2020:  T12/L1 transforaminal epidural steroid injection; Dr. Dias  -01/31/2020: Bilateral L3-5 medial branch blocks; Dr. Dias  -11/12/2019: Bilateral L4/5 transforaminal epidural steroid injection; Dr. Dias      Imaging / Labs / Studies (reviewed on 11/24/2023):    Results for orders placed during the hospital encounter of 10/11/23    X-Ray Cervical Spine 5 View W Flex Extxt    Narrative  EXAM: XR CERVICAL SPINE 5 VIEW WITH FLEX AND EXT    CLINICAL HISTORY: Cervical Spondylosis; [M47.812]-Spondylosis without myelopathy or radiculopathy, cervical region.    TECHNIQUE: Frontal, lateral, flexion, extension, odontoid, and oblique views of the cervical spine.    COMPARISON: None available.    FINDINGS:  There is no acute fracture or compression deformity. Bones are demineralized. No aggressive lytic or blastic lesion seen.  Mild cervical dextrocurvature noted.  There is mild straightening of the normal cervical lordosis as well as mild anterolisthesis of C4 on C5.  No evidence of instability with flexion/extension.  Remaining alignment is unremarkable.  There is multilevel loss of intervertebral disc height with associated uncovertebral hypertrophy and endplate ossified ptosis, most prominent at C5-C6 and C6-C7.  More mild degenerative disc changes at C3-C4 and C4-C5. Multilevel bilateral facet arthropathy also noted. No gross bony spinal canal stenosis.  Moderate to severe left neuroforaminal stenosis at the C5-C6 and C6-C7 levels.  Additional mild neuroforaminal stenosis at the mid to upper cervical levels.  Left carotid calcifications noted.  Visualized soft tissues are otherwise  unremarkable.    Impression  Mild cervical thoracic curvature and straightening of the normal cervical lordosis.  Mild anterolisthesis of C4 on C5 without evidence of instability.  Osteopenia and multilevel degenerative changes as above.    Finalized on: 10/11/2023 12:48 PM By:  Elie Amor MD  [facility]# [id number]      2023-10-11 12:50:[id number]    [facility]    Sacrum/Coccyx X-rays:  EXAM: XR SACRUM AND COCCYX   HISTORY: Pain   FINDINGS:   3 views were obtained of the sacrum/coccyx.   Bilateral hip replacement hardware.  Degenerative changes within the visualized lumbar spine.  No visualized fracture.   The bones are osteopenic.      Impression:   No acute findings are identified.      CT Cervical spine 06/23/2023  FINDINGS:  There is unchanged grade 1 anterolisthesis of C3 on C4 and C4 on C5.  There is no new spondylolisthesis.  There is no loss of vertebral body height.  There is multilevel degenerative disc space narrowing most significant at C5-C6 and C6-C7.  There is unchanged diffuse degenerative facet arthropathy with posterior disc osteophyte complexes and uncovertebral osteophytosis resulting in varying degrees of canal and foraminal stenosis.  This is incompletely evaluated on this exam.     The included portions of the posterior fossa are normal.  The craniocervical junction is symmetric.  The atlantoaxial articulation is normal.  The airway is widely patent.  The thyroid gland is normal.  There is no cervical adenopathy.  The visualized lung apices are clear.     Impression:     Multilevel degenerative spondylolisthesis and spondylosis resulting in varying degrees of canal and foraminal stenosis, grossly unchanged from prior exam.    CT lumbar spine 06/23/2023  FINDINGS:  Five non-rib-bearing lumbar type vertebral bodies are present.  There is mild Levoscoliotic curvature of the lumbar spine centered at L3, similar compared to the prior.     With regards to alignment, there is minimal right lateral listhesis of  L1 on L2 and L2 on L3.  There is minimal left lateral listhesis of L3 on L4.  No anterolisthesis or retrolisthesis.     There are chronic compression deformities through the superior endplate of T12, inferior endplate of L1 and superior endplate of L2.  Degree of vertebral body height loss is stable since 02/07/2023.  Vertebral body heights from L3-L5 are preserved.     Mild to severe degenerative disc changes are present throughout the lumbar spine, worst at the right half of L3-L4 and L4-L5.  Partially calcified disc bulges are seen at L2-L3, L3-L4, L4-L5 and L5-S1.  Evaluation for central canal narrowing is limited without intrathecal contrast.  Degrees of central canal stenosis appear worst at L3-L4 and L4-L5.     Hypertrophic facet changes are present throughout the lumbar spine, worst at L3-L4 and L4-L5.     Paraspinal soft tissues appear within normal limits.  Both SI joints appear intact with mild degenerative changes, similar compared to the prior.     Impression:     1. Multilevel degenerative disc and hypertrophic facet changes similar compared to 02/07/2023.  2. Chronic appearing compression deformities at T12, L1 and L2, similar compared to 02/07/2023.  Dense of acute injury.      01/23/20    X-Ray Lumbar Spine Ap And Lateral  FINDINGS:  Levoscoliosis present.  Vertebral body heights stable.  Alignment unchanged without spondylolisthesis.  Similar appearing multilevel degenerative disc height loss and osteophyte findings noted.  Multilevel facet degenerative findings remain.  Aorta iliac atherosclerotic calcification.  Colonic constipation findings present.    6/04/19    X-Ray Cervical Spine AP And Lateral    FINDINGS:  Reversal normal C-spine curvature noted on the lateral view with visualization to the C7-T1 level.  Minimal anterior subluxation C4 on C5 noted.  There is multilevel marginal spurring and varying degrees of loss of disc height throughout the C-spine.  No acute fracture identified in  "prevertebral soft tissues, C1-2 articulation and odontoid appear within normal limits allowing for positioning.  Minimal vascular calcification identified on the left in the area of the carotid vessels.    EMG 02/24/2023  IMPRESSION  1. ABNORMAL study  2. 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  There is an acute on chronic radiculopathy of the LEFT C6 nerve root and a subacute on chronic of the RIGHT C4 and C5 nerve roots and a CHRONIC radiculopathy of bilateral C4-T1 nerve roots    Review of Systems:  CONSTITUTIONAL: patient denies any fever, chills, or weight loss  SKIN: patient denies any rash or itching  RESPIRATORY: patient denies having any shortness of breath  GASTROINTESTINAL: patient denies having any diarrhea, constipation, or bowel incontinence  GENITOURINARY: patient denies having any abnormal bladder function    MUSCULOSKELETAL:  - patient complains of the above noted pain/s (see chief pain complaint)    NEUROLOGICAL:   - pain as above  - strength in Upper and Lower extremities is decreased, BILATERALLY  - sensation in Upper extremities is intact, BILATERALLY  - patient denies any loss of bowel or bladder control      PSYCHIATRIC: patient denies any change in mood    Other:  All other systems reviewed and are negative      Physical Exam:  BP (!) 151/67   Pulse 86   Ht 5' 4" (1.626 m)   Wt 49.7 kg (109 lb 10.9 oz)   LMP  (LMP Unknown)   BMI 18.83 kg/m²  (reviewed on 11/24/2023)  GENERAL: Well appearing, in no acute distress, alert and oriented x3.  PSYCH:  Mood and affect appropriate.  SKIN: Skin color, texture, turgor normal, no rashes or lesions.  HEAD/FACE:  Normocephalic, bruising along right lower jaw, Cranial nerves grossly " intact.  PULM: No evidence of respiratory difficulty, symmetric chest rise.  GI:  Soft and non-tender.    Neck: TTP along cervical facet joints, positive facet loading,  pain with flexion, extension, and rotation - right greater than left. ROM decreased secondary to pain. 4/5 ricky strength bilateral upper extremities.  BACK: Straight leg raising in the sitting and supine positions is negative to radicular pain. pain to palpation over the facet joints of the lumbar spine or spinous processes.  Reduced range of motion with pain reproduction.    EXTREMITIES:  Peripheral joint range of motion is reduced with pain with obvious instability in bilateral lower extremities.  No deformities, edema, or skin discoloration. Good capillary refill.  MUSCULOSKELETAL:  Unable to stand on heels and toes  hip, and knee provocative maneuvers are negative.  There is no pain with palpation over the sacroiliac joints bilaterally.  Gaenslen's, Distraction/Compression and  FABERs test is negative.  Facet loading test is positive bilaterally.   Bilateral upper and lower extremity strength is normal and symmetric.  No atrophy or tone abnormalities are noted.      Shoulder: Right  - Pain on abduction: Present  - ROM:  Decreased secondary to pain  - TTP over the AC and GH joint: Present  - Neer's: Positive   - Hawkin's: Positive       NEURO: Bilateral upper and lower extremity coordination and muscle stretch reflexes are physiologic and symmetric. No loss of sensation is noted.  GAIT:  Patient presents in wheelchair today.  Antalgic gait.      Assessment:    Shireen Felix is a 77 y.o. year old female who is presenting with   Encounter Diagnoses   Name Primary?    Cervical spondylosis     DDD (degenerative disc disease), cervical     Cervical radiculopathy Yes    Shoulder arthritis     Osteoarthritis of right shoulder, unspecified osteoarthritis type     Lumbar muscle pain                  Plan:    1. Interventional:      Schedule Cervical  IL epidural steroid injection at C6/7 to help with neck and radicular symptoms. Explained the risks and benefits of the procedure in detail with the patient today in clinic along with alternative treatment options, and the patient elected to pursue the intervention.      - S/p  Sacrococcygeal joint injection with 80% relief on 10/25/2023     Trigger Point Injections performed in the office today. See procedure note below. Patient tolerated well.    Anticoagulation:  Yes, Plavix  Will obtain cardiac clearance from Dr. Dye to pause Plavix 5 days prior to Cervical XANDER.      2. Pharmacologic:      report:  Reviewed and consistent with medication use as prescribed.        -Continue Gabapentin 100 mg BID. We have reviewed potential side effects of this medication including daytime somnolence, weight gain and peripheral edema    -Continue tizanidine 4-8 mg prn pain.We have discussed potential deleterious side effects associated with this medication including  dizziness, drowsiness, dry mouth or tingling sensation in the upper or lower extremities.     -Continue Nabumetone 500 mg BID for inflammation and pain. We have discussed potential deleterious side effects of NSAIDs on the cardiovascular, gastrointestinal and renal systems. We have discussed judicious use of this medication. Pt expresses understanding.       3. Rehabilitative:   -We discussed initiating physical therapy to help manage the patient/s painful condition. The patient was counseled that muscle strengthening will improve the long term prognosis in regards to pain and may also help increase range of motion and mobility. They were told that one of the goals of physical therapy is that they learn how to do the exercises so that they can do them independently at home daily upon completion. The patient's questions were answered and they were agreeable to this course.     DME:  - Patient would benefit from TENs unit to help increase ROM, reduce pain,  strengthen, and allow return to ADLs.  Patient is awaiting to hear back from DME.    4. Diagnostic:  No new imaging ordered today.     5. Consults: Referral placed with Orthopedics to discuss treatment regarding Right shoulder pain.          Follow up with Dr. Navarro for CTS injections. If unable to get in, patient advised to reach out for referral to see Dr. John.    6.  Follow up: 4-6 weeks post Cervical XANDER.    Augustin Nichols PA-C  Interventional Pain Medicine      Procedure note:   Trigger Point Injection:   The procedure was discussed with the patient including complications of nerve damage,  bleeding, infection, and failure of pain relief.   Trigger points were identified by palpation and marked. Alcohol swab prep of sites done. A mixture of  7mL 1% lidocaine + 40 mg Depo-Medrol was prepared (8 mL total).   A 25-gauge needle was advanced to the point of maximal tenderness, and the medication mixture  was injected after negative aspiration. Patient tolerated the procedure well and without complications. Patient was monitored for 15 min following the injection before discharged from the clinic.  Sites injected included:  bilateral quadratus lumborum    The patient tolerated the procedure well and was sent home in stable condition.  The patient was instructed to apply an ice pack as needed and not to do anything strenuous for the next 48 hours.           The above plan and management options were discussed at length with patient. Patient is in agreement with the above and verbalized understanding.  - I discussed the goals of interventional chronic pain management with the patient on today's visit. We discussed a multimodal and systematic approach to pain.  This includes diagnostic and therapeutic injections, adjuvant pharmacologic treatment, physical therapy, and at times psychiatry.  I emphasized the importance of regular exercise, core strengthening and stretching, diet and weight loss as a cornerstone of  long-term pain management.    - This condition does not require this patient to take time off of work, and the primary goal of our Pain Management services is to improve the patient's functional capacity.  - Patient Questions: Answered all of the patient's questions regarding diagnoses, therapy, treatment and next steps    Disclaimer:  This note may have been prepared using voice recognition software, it may have not been extensively proofed, as such there could be errors within the text such as sound alike errors.

## 2023-11-28 ENCOUNTER — TELEPHONE (OUTPATIENT)
Dept: RHEUMATOLOGY | Facility: CLINIC | Age: 78
End: 2023-11-28
Payer: MEDICARE

## 2023-11-28 ENCOUNTER — TELEPHONE (OUTPATIENT)
Dept: PULMONOLOGY | Facility: CLINIC | Age: 78
End: 2023-11-28
Payer: MEDICARE

## 2023-11-28 DIAGNOSIS — J45.40 MODERATE PERSISTENT ASTHMA WITHOUT COMPLICATION: ICD-10-CM

## 2023-11-28 DIAGNOSIS — F33.1 MODERATE RECURRENT MAJOR DEPRESSION: ICD-10-CM

## 2023-11-28 DIAGNOSIS — J30.89 NON-SEASONAL ALLERGIC RHINITIS DUE TO OTHER ALLERGIC TRIGGER: ICD-10-CM

## 2023-11-28 RX ORDER — IPRATROPIUM BROMIDE 42 UG/1
SPRAY, METERED NASAL
Qty: 15 ML | Refills: 11 | Status: SHIPPED | OUTPATIENT
Start: 2023-11-28

## 2023-11-28 RX ORDER — FLUTICASONE FUROATE, UMECLIDINIUM BROMIDE AND VILANTEROL TRIFENATATE 200; 62.5; 25 UG/1; UG/1; UG/1
POWDER RESPIRATORY (INHALATION)
Qty: 60 EACH | Refills: 11 | Status: SHIPPED | OUTPATIENT
Start: 2023-11-28

## 2023-11-28 RX ORDER — FLUTICASONE PROPIONATE 50 MCG
SPRAY, SUSPENSION (ML) NASAL
Qty: 16 G | Refills: 11 | Status: SHIPPED | OUTPATIENT
Start: 2023-11-28

## 2023-11-28 NOTE — TELEPHONE ENCOUNTER
----- Message from Dyana Fishman NP sent at 11/28/2023  1:01 PM CST -----  Regarding: replacement neb machine request  Contact: [phone]  Please find out what machine is doing to say broken. Will need to state what machine is doing or not doing in order. Thank you    ----- Message -----  From: Marga Maurer MA  Sent: 11/28/2023  12:46 PM CST  To: Dyana Fishman NP    Pt wants to know if you can order neb machine for her .  ----- Message -----  From: Gillian Verdin  Sent: 11/28/2023  11:18 AM CST  To: Sravanthi SMITH Staff    Patient is calling in regards to getting a prescription for a new nebulizer machine. Pt stated that her current one is broken. Please call pt back at [phone]. Thanks KB

## 2023-11-28 NOTE — TELEPHONE ENCOUNTER
----- Message from Evette Rolandgary sent at 11/28/2023  2:26 PM CST -----  Contact: pt's brother/Rm Abdalla is calling in regard to the pt going to ER at [facility] and has pneumonia.  They will need to parul her appts for injection and 's visit on 11/29 to another date.  Please call him back at [phone] or [phone] thanks/mpd

## 2023-11-28 NOTE — TELEPHONE ENCOUNTER
"Attempted to return patients brothers phone call. Left v/m for patients brother to call back at earliest convenience.    Josiane Smallwood (Allye), [facility]  Rheumatology Department    "

## 2023-11-29 NOTE — TELEPHONE ENCOUNTER
Refill Routing Note   Medication(s) are not appropriate for processing by [facility] for the following reason(s):        No active prescription written by provider    [facility] action(s):  Defer               Appointments  past 12m or future 3m with PCP    Date Provider   Last Visit   Visit date not found Olivia Mcdowell NP   Next Visit   Visit date not found Olivia Mcdowell NP   ED visits in past 90 days: 0        Note composed:5:11 PM 11/29/2023

## 2023-11-30 ENCOUNTER — PATIENT MESSAGE (OUTPATIENT)
Dept: PAIN MEDICINE | Facility: CLINIC | Age: 78
End: 2023-11-30
Payer: MEDICARE

## 2023-11-30 ENCOUNTER — TELEPHONE (OUTPATIENT)
Dept: PAIN MEDICINE | Facility: CLINIC | Age: 78
End: 2023-11-30
Payer: MEDICARE

## 2023-11-30 NOTE — TELEPHONE ENCOUNTER
----- Message from Tina Nicole MA sent at 11/28/2023  1:34 PM CST -----  Regarding: FW: CLEARANCE    ----- Message -----  From: Jarrell Tejeda MD  Sent: 11/28/2023   1:30 PM CST  To: Keke Alves MA; Enrique Blandon MA; #  Subject: RE: CLEARANCE                                    Hi    Yes she is at a low CV risk to hold Plavix 5 days prior to procedure and resume post op. Thanks    - Dr. Tejeda  ----- Message -----  From: Tina Nicole MA  Sent: 11/28/2023   1:25 PM CST  To: Jarrell Tejeda MD; Keke Alves MA; #  Subject: CLEARANCE                                        Dr. TEJEDA:    Shireen Felix was seen in office with complaints of severe neck pain. Dr. Oshea would like to perform cervical epidural, and Shireen Felix would like to proceed. Dr. Oshea is requesting for clearance to hold clopidogrel (Plavix) 5 days prior to procedure. Patient Shireen Felix can resume medication following the procedure.         EXTERNAL REQUEST:  If you are in agreement with this request, please sign below and fax back to our clinic.       Patient is cleared to proceed with procedure listed above with these anticoagulant protocols. Patient will be instructed when to resume these medication following the procedure.     Thank you,     _________________________________                         (Please Sign Here)    Dr. TEJEDA  (Cardiology)

## 2023-12-04 RX ORDER — ESCITALOPRAM OXALATE 20 MG/1
TABLET ORAL
Qty: 90 TABLET | Refills: 2 | Status: SHIPPED | OUTPATIENT
Start: 2023-12-04

## 2023-12-06 ENCOUNTER — TELEPHONE (OUTPATIENT)
Dept: PULMONOLOGY | Facility: CLINIC | Age: 78
End: 2023-12-06
Payer: MEDICARE

## 2023-12-06 NOTE — TELEPHONE ENCOUNTER
----- Message from Tressa Rodriguez sent at 12/6/2023  4:23 PM CST -----  Contact: Veronica Martin is calling to speak to the nurse regarding her nebulizer machine currently not working, she just got out of the hospital for copd and bronchitis and is barely breathing. Please give her a call at  [phone]    Thanks  LJ

## 2023-12-07 ENCOUNTER — PATIENT MESSAGE (OUTPATIENT)
Dept: PAIN MEDICINE | Facility: CLINIC | Age: 78
End: 2023-12-07
Payer: MEDICARE

## 2023-12-08 DIAGNOSIS — J41.1 MUCOPURULENT CHRONIC BRONCHITIS: Primary | Chronic | ICD-10-CM

## 2023-12-08 RX ORDER — ALBUTEROL SULFATE 0.83 MG/ML
SOLUTION RESPIRATORY (INHALATION)
Qty: 180 ML | Refills: 11 | Status: SHIPPED | OUTPATIENT
Start: 2023-12-08

## 2023-12-08 NOTE — TELEPHONE ENCOUNTER
----- Message from Valdemar House MA sent at 12/7/2023  4:20 PM CST -----  Good afternoon. Patient called stating her neb machine is broken and she wants to get a new one. She stated her neb machine is cutting on at all. Patient stated machine has been broken for 3 wks. Please advise. Thanks!

## 2023-12-08 NOTE — TELEPHONE ENCOUNTER
"  Orders Placed This Encounter   Procedures    NEBULIZER KIT (SUPPLIES) FOR HOME USE     Medical Necessity of Nebulizer Treatment:  The use of a nebulizer is explicitly recommended on a daily basis for treatment of Chronic Obstructive Pulmonary Disease. Use of the nebulizer is medically necessary for mobilization of secretions for relief of pulmonary symptoms of coughing and airway obstruction. Additionally the use of nebulizer is medically necessary for administration of bronchodilator medications and in some cases inhaled steroids and inhaled antibiotics. The use of nebulizer is recommended at least twice a day and may be used as often as every 4- 6 hours depending on the clinical condition.     Order Specific Question:   Height:     Answer:   5'4"     Order Specific Question:   Weight:     Answer:   109 lbs     Order Specific Question:   Does patient have medical equipment at home?     Answer:   nebulizer     Order Specific Question:   Length of need (1-99 months):     Answer:   99     Order Specific Question:   Mask or Mouthpiece?     Answer:   Mouthpiece    NEBULIZER FOR HOME USE     Current neb machine no longer providing mist, quit working 3 weeks ago. Needs replacement.  Medical Necessity of Nebulizer Treatment:  The use of a nebulizer is explicitly recommended on a daily basis for treatment of Chronic Obstructive Pulmonary Disease. Use of the nebulizer is medically necessary for mobilization of secretions for relief of pulmonary symptoms of coughing and airway obstruction. Additionally the use of nebulizer is medically necessary for administration of bronchodilator medications and in some cases inhaled steroids and inhaled antibiotics. The use of nebulizer is recommended at least twice a day and may be used as often as every 4- 6 hours depending on the clinical condition.     Order Specific Question:   Height:     Answer:   5'4"     Order Specific Question:   Weight:     Answer:   109 lbs     Order Specific " Question:   Does patient have medical equipment at home?     Answer:   nebulizer     Order Specific Question:   Length of need (1-99 months):     Answer:   99     1. Mucopurulent chronic bronchitis  albuterol (PROVENTIL) 2.5 mg /3 mL (0.083 %) nebulizer solution    NEBULIZER KIT (SUPPLIES) FOR HOME USE    NEBULIZER FOR HOME USE

## 2023-12-11 ENCOUNTER — TELEPHONE (OUTPATIENT)
Dept: PAIN MEDICINE | Facility: CLINIC | Age: 78
End: 2023-12-11
Payer: MEDICARE

## 2023-12-11 NOTE — TELEPHONE ENCOUNTER
Reach out to pt to reschedule her follow up appt because her procedure is on  12/27/23. All question answered.     Jimi Stockton   Medical

## 2023-12-12 ENCOUNTER — TELEPHONE (OUTPATIENT)
Dept: DERMATOLOGY | Facility: CLINIC | Age: 78
End: 2023-12-12
Payer: MEDICARE

## 2023-12-12 NOTE — TELEPHONE ENCOUNTER
Returned call to pt, pt is scheduled.      ----- Message from Mari Felix sent at 12/12/2023  1:13 PM CST -----  Contact: [phone]    Patient: Shireen Felix-    Reason: The patient is requesting a call back from the nurse to get assistance with scheduling an     appointment for hair loss.     Comments: Please call the patient back to advise.

## 2023-12-12 NOTE — PATIENT INSTRUCTIONS
Denosumab injection  What is this medicine?  DENOSUMAB (den oh flavia mab) slows bone breakdown. Prolia is used to treat osteoporosis in women after menopause and in men. Xgeva is used to prevent bone fractures and other bone problems caused by cancer bone metastases. Xgeva is also used to treat giant cell tumor of the bone.  How should I use this medicine?  This medicine is for injection under the skin. It is given by a health care professional in a hospital or clinic setting.  If you are getting Prolia, a special MedGuide will be given to you by the pharmacist with each prescription and refill. Be sure to read this information carefully each time.  For Prolia, talk to your pediatrician regarding the use of this medicine in children. Special care may be needed. For Xgeva, talk to your pediatrician regarding the use of this medicine in children. While this drug may be prescribed for children as young as 13 years for selected conditions, precautions do apply.  What side effects may I notice from receiving this medicine?  Side effects that you should report to your doctor or health care professional as soon as possible:  · allergic reactions like skin rash, itching or hives, swelling of the face, lips, or tongue  · breathing problems  · chest pain  · fast, irregular heartbeat  · feeling faint or lightheaded, falls  · fever, chills, or any other sign of infection  · muscle spasms, tightening, or twitches  · numbness or tingling  · skin blisters or bumps, or is dry, peels, or red  · slow healing or unexplained pain in the mouth or jaw  · unusual bleeding or bruising  Side effects that usually do not require medical attention (Report these to your doctor or health care professional if they continue or are bothersome.):  · muscle pain  · stomach upset, gas  What may interact with this medicine?  Do not take this medicine with any of the following medications:  · other medicines containing denosumab  This medicine may also  -- Message is from [employer] ([employer]) --    Called and left voicemail to inform the patient the symptom they entered was not detailed enough for their appointment at the [facility].      ECO AGENT: If the patient calls back, use Emergent Symptom List to screen the symptom and then follow the outcome.    ECO AGENT: Cancel the [facility] appointment if it is confirmed that symptoms are not in-scope.    interact with the following medications:  · medicines that suppress the immune system  · medicines that treat cancer  · steroid medicines like prednisone or cortisone  What if I miss a dose?  It is important not to miss your dose. Call your doctor or health care professional if you are unable to keep an appointment.  Where should I keep my medicine?  This medicine is only given in a clinic, doctor's office, or other health care setting and will not be stored at home.  What should I tell my health care provider before I take this medicine?  They need to know if you have any of these conditions:  · dental disease  · eczema  · infection or history of infections  · kidney disease or on dialysis  · low blood calcium or vitamin D  · malabsorption syndrome  · scheduled to have surgery or tooth extraction  · taking medicine that contains denosumab  · thyroid or parathyroid disease  · an unusual reaction to denosumab, other medicines, foods, dyes, or preservatives  · pregnant or trying to get pregnant  · breast-feeding  What should I watch for while using this medicine?  Visit your doctor or health care professional for regular checks on your progress. Your doctor or health care professional may order blood tests and other tests to see how you are doing.  Call your doctor or health care professional if you get a cold or other infection while receiving this medicine. Do not treat yourself. This medicine may decrease your body's ability to fight infection.  You should make sure you get enough calcium and vitamin D while you are taking this medicine, unless your doctor tells you not to. Discuss the foods you eat and the vitamins you take with your health care professional.  See your dentist regularly. Brush and floss your teeth as directed. Before you have any dental work done, tell your dentist you are receiving this medicine.  Do not become pregnant while taking this medicine or for 5 months after stopping it. Women should  inform their doctor if they wish to become pregnant or think they might be pregnant. There is a potential for serious side effects to an unborn child. Talk to your health care professional or pharmacist for more information.  NOTE:This sheet is a summary. It may not cover all possible information. If you have questions about this medicine, talk to your doctor, pharmacist, or health care provider. Copyright© 2017 [employer]

## 2023-12-18 ENCOUNTER — NURSE TRIAGE (OUTPATIENT)
Dept: ADMINISTRATIVE | Facility: CLINIC | Age: 78
End: 2023-12-18
Payer: MEDICARE

## 2023-12-18 NOTE — TELEPHONE ENCOUNTER
Call disconnected while being transferred to me regarding a cough. I have LVM for return call.    Reason for Disposition   Message left on identified voicemail    Protocols used: No Contact or Duplicate Contact Call-A-OH

## 2023-12-18 NOTE — TELEPHONE ENCOUNTER
Spoke with pt who was diagnosed with RSV today at . Pt reports she was seen at outside facility, states she would like to speak with office regarding treatment for diagnosis. Pt reports having cough, and sore throat. Advised should receive call back from office within 1 hour. Verbalized understanding.    Reason for Disposition   Caller has URGENT question and triager unable to answer question    Additional Information   Negative: Sounds like a life-threatening emergency to the triager   Negative: Patient sounds very sick or weak to the triager   Negative: Sounds like a serious complication to the triager   Negative: Condition / symptoms WORSE    Protocols used: Post-Hospitalization Follow-up Call-A-[facility]

## 2023-12-18 NOTE — TELEPHONE ENCOUNTER
Spoke with patient's brother and informed him that they could treat symptoms of RSV with over the counter medications as per the pharmacist and that if she worsens or has difficulty breathing to see emergency medical care. He verbalized understanding.

## 2023-12-21 ENCOUNTER — TELEPHONE (OUTPATIENT)
Dept: INTERNAL MEDICINE | Facility: CLINIC | Age: 78
End: 2023-12-21
Payer: MEDICARE

## 2023-12-21 NOTE — TELEPHONE ENCOUNTER
----- Message from Gillian Verdin sent at 12/21/2023 11:37 AM CST -----  Contact: [phone]  Patient is calling in regards to being diagnosed with rsv. She stated that she is currently out of antibiotic and is still feeling bad. Please call her back at [phone]. Thanks KB

## 2023-12-21 NOTE — TELEPHONE ENCOUNTER
Spoke with patient's brother, mR. He stated that he feels she is still in bad shape from the RSV and has finished her antibiotics. He said that he is worried about the condition of her lungs because before this she had just gotten over a bad case of pneumonia. He said that he feels she needs to be admitted into the hospital. Advised that patient be evaluated in the emergency room. He verbalized understanding.

## 2023-12-22 ENCOUNTER — OFFICE VISIT (OUTPATIENT)
Dept: PRIMARY CARE CLINIC | Facility: CLINIC | Age: 78
End: 2023-12-22
Payer: MEDICARE

## 2023-12-22 VITALS
OXYGEN SATURATION: 98 % | HEART RATE: 79 BPM | TEMPERATURE: 97 F | WEIGHT: 110 LBS | HEIGHT: 64 IN | DIASTOLIC BLOOD PRESSURE: 70 MMHG | BODY MASS INDEX: 18.78 KG/M2 | SYSTOLIC BLOOD PRESSURE: 104 MMHG

## 2023-12-22 DIAGNOSIS — J06.9 BACTERIAL UPPER RESPIRATORY INFECTION: Primary | ICD-10-CM

## 2023-12-22 DIAGNOSIS — B96.89 BACTERIAL UPPER RESPIRATORY INFECTION: Primary | ICD-10-CM

## 2023-12-22 DIAGNOSIS — R05.1 ACUTE COUGH: ICD-10-CM

## 2023-12-22 PROCEDURE — 1100F PR PT FALLS ASSESS DOC 2+ FALLS/FALL W/INJURY/YR: ICD-10-PCS | Mod: CPTII,S$GLB,, | Performed by: NURSE PRACTITIONER

## 2023-12-22 PROCEDURE — 3074F SYST BP LT 130 MM HG: CPT | Mod: CPTII,S$GLB,, | Performed by: NURSE PRACTITIONER

## 2023-12-22 PROCEDURE — 99213 OFFICE O/P EST LOW 20 MIN: CPT | Mod: S$GLB,,, | Performed by: NURSE PRACTITIONER

## 2023-12-22 PROCEDURE — 2023F PR DILATED RETINAL EXAM W/O EVID OF RETINOPATHY: ICD-10-PCS | Mod: CPTII,S$GLB,, | Performed by: NURSE PRACTITIONER

## 2023-12-22 PROCEDURE — 3078F PR MOST RECENT DIASTOLIC BLOOD PRESSURE < 80 MM HG: ICD-10-PCS | Mod: CPTII,S$GLB,, | Performed by: NURSE PRACTITIONER

## 2023-12-22 PROCEDURE — 2023F DILAT RTA XM W/O RTNOPTHY: CPT | Mod: CPTII,S$GLB,, | Performed by: NURSE PRACTITIONER

## 2023-12-22 PROCEDURE — 1100F PTFALLS ASSESS-DOCD GE2>/YR: CPT | Mod: CPTII,S$GLB,, | Performed by: NURSE PRACTITIONER

## 2023-12-22 PROCEDURE — 3074F PR MOST RECENT SYSTOLIC BLOOD PRESSURE < 130 MM HG: ICD-10-PCS | Mod: CPTII,S$GLB,, | Performed by: NURSE PRACTITIONER

## 2023-12-22 PROCEDURE — 99999 PR PBB SHADOW E&M-EST. PATIENT-LVL III: ICD-10-PCS | Mod: PBBFAC,,, | Performed by: NURSE PRACTITIONER

## 2023-12-22 PROCEDURE — 3078F DIAST BP <80 MM HG: CPT | Mod: CPTII,S$GLB,, | Performed by: NURSE PRACTITIONER

## 2023-12-22 PROCEDURE — 99999 PR PBB SHADOW E&M-EST. PATIENT-LVL III: CPT | Mod: PBBFAC,,, | Performed by: NURSE PRACTITIONER

## 2023-12-22 PROCEDURE — 3288F FALL RISK ASSESSMENT DOCD: CPT | Mod: CPTII,S$GLB,, | Performed by: NURSE PRACTITIONER

## 2023-12-22 PROCEDURE — 3288F PR FALLS RISK ASSESSMENT DOCUMENTED: ICD-10-PCS | Mod: CPTII,S$GLB,, | Performed by: NURSE PRACTITIONER

## 2023-12-22 PROCEDURE — 99213 PR OFFICE/OUTPT VISIT, EST, LEVL III, 20-29 MIN: ICD-10-PCS | Mod: S$GLB,,, | Performed by: NURSE PRACTITIONER

## 2023-12-22 RX ORDER — TIZANIDINE 4 MG/1
4 TABLET ORAL EVERY 8 HOURS
COMMUNITY
Start: 2023-11-28

## 2023-12-22 RX ORDER — PROMETHAZINE HYDROCHLORIDE AND DEXTROMETHORPHAN HYDROBROMIDE 6.25; 15 MG/5ML; MG/5ML
5 SYRUP ORAL EVERY 6 HOURS PRN
Qty: 240 ML | Refills: 0 | Status: SHIPPED | OUTPATIENT
Start: 2023-12-22 | End: 2024-01-03 | Stop reason: SDUPTHER

## 2023-12-22 RX ORDER — PREDNISONE 10 MG/1
10 TABLET ORAL DAILY
Qty: 30 TABLET | Refills: 0 | Status: SHIPPED | OUTPATIENT
Start: 2023-12-22 | End: 2024-01-03

## 2023-12-22 RX ORDER — METOPROLOL TARTRATE 100 MG/1
100 TABLET ORAL 2 TIMES DAILY
COMMUNITY
Start: 2023-11-28

## 2023-12-22 RX ORDER — OMEPRAZOLE 40 MG/1
40 CAPSULE, DELAYED RELEASE ORAL EVERY MORNING
COMMUNITY
Start: 2023-11-28

## 2023-12-22 RX ORDER — CEFDINIR 300 MG/1
300 CAPSULE ORAL 2 TIMES DAILY
Qty: 20 CAPSULE | Refills: 0 | Status: SHIPPED | OUTPATIENT
Start: 2023-12-22 | End: 2024-01-01

## 2023-12-22 NOTE — PROGRESS NOTES
Assessment/Plan:    Problem List Items Addressed This Visit    None  Visit Diagnoses       Bacterial upper respiratory infection    -  Primary    Relevant Medications    predniSONE (DELTASONE) 10 MG tablet    cefdinir (OMNICEF) 300 MG capsule    Acute cough        Relevant Medications    promethazine-dextromethorphan (PROMETHAZINE-DM) 6.25-15 mg/5 mL Syrp     -start antibiotics as prescribed  -Continue Flonase and Mucinex  -supportive care- rest, increase hydration with water, OTC Tylenol/Ibuprofen for pain/fever  -follow up if no improvement in symptoms   -ER precautions for severe or worsening of symptoms       Follow up if symptoms worsen or fail to improve.    Day Rodriguez, MC  _____________________________________________________________________________________________________________________________________________________    CC: cough, congestion, wheezing     HPI:    Patient is in clinic today as an established patient. Upper Respiratory Infection: Patient complains of symptoms of a URI. Symptoms include congestion, cough, and wheezing . Onset of symptoms was 1 week ago, unchanged since that time. .  She is not drinking much. Evaluation to date: 12/18/23 by ED   seen previously and thought to have a COPD exacerbation and acute bronchitis and was given zpack and prednisone with mild improvement.      No other new complaints today.  Remaining chronic conditions have been reviewed and remain stable. Further detail as stated above.      reviewed.     No recent changes to medical/surgical history.    Current Outpatient Medications on File Prior to Visit   Medication Sig Dispense Refill    albuterol (PROVENTIL) 2.5 mg /3 mL (0.083 %) nebulizer solution EMPTY 1 VIAL INTO NEBULIZER EVERY 4 HOURS AS NEEDED FRO RESCUE 180 mL 11    albuterol (PROVENTIL/VENTOLIN HFA) 90 mcg/actuation inhaler INHALE TWO PUFFS INTO THE LUNGS EVERY 4 HOURS AS NEEDED 18 g 11    APTIOM 400 mg Tab tablet Take 400 mg by mouth.       atorvastatin (LIPITOR) 40 MG tablet TAKE 1 TABLET BY MOUTH ONCE DAILY 90 tablet 1    carvediloL (COREG) 6.25 MG tablet Take 1 tablet (6.25 mg total) by mouth 2 (two) times daily with meals. 180 tablet 1    cholecalciferol, vitamin D3, 1,250 mcg (50,000 unit) capsule Take 1 capsule (50,000 Units total) by mouth every 7 days. 12 capsule 0    cholestyramine (QUESTRAN) 4 gram packet TAKE 1 PACKET BY MOUTH TWICE DAILY AS DIRECTED 60 packet 11    clopidogreL (PLAVIX) 75 mg tablet Take 1 tablet (75 mg total) by mouth once daily. 90 tablet 3    DALIRESP 500 mcg Tab TAKE 1 TABLET BY MOUTH ONCE DAILY 90 tablet 3    denosumab (PROLIA) 60 mg/mL Syrg every 6 (six) months.      dicyclomine (BENTYL) 10 MG capsule Take 1 capsule (10 mg total) by mouth 2 (two) times daily. 10 capsule 0    diltiaZEM (CARDIZEM CD) 120 MG Cp24 Take 2 capsules (240 mg total) by mouth once daily. 180 capsule 1    dorzolamide-timolol 2-0.5% (COSOPT) 22.3-6.8 mg/mL ophthalmic solution Place 1 drop into both eyes every 12 (twelve) hours. 10 mL 12    EScitalopram oxalate (LEXAPRO) 20 MG tablet TAKE 1 TABLET BY MOUTH ONCE DAILY 90 tablet 2    fluocinonide (LIDEX) 0.05 % external solution Apply topically 2 (two) times daily. Use on itchy spots on scalp and ear 60 mL 2    fluticasone propionate (FLONASE) 50 mcg/actuation nasal spray USE 2 SPRAYS INTO EACH NOSTRIL ONCE A DAY AT 6AM AS DIRECTED 16 g 11    fluticasone-umeclidin-vilanter (TRELEGY ELLIPTA) 200-62.5-25 mcg inhaler INHALE 1 PUFF INTO THE LUNGS DAILY 60 each 11    furosemide (LASIX) 20 MG tablet Take 20 mg by mouth daily as needed.      gabapentin (NEURONTIN) 300 MG capsule Take 1 capsule (300 mg total) by mouth 3 (three) times daily. 90 capsule 1    hydrOXYzine HCL (ATARAX) 25 MG tablet Take 1 tablet (25 mg total) by mouth every evening. Prn pruritus. Do not drive or operate machinery while taking this medicine. 30 tablet 2    ipratropium (ATROVENT) 42 mcg (0.06 %) nasal spray USE 2  SPRAYS INTO EACH NOSTRIL FOUR TIMES DAILY 15 mL 11    isosorbide mononitrate (IMDUR) 60 MG 24 hr tablet Take 1 tablet (60 mg total) by mouth every evening. 90 tablet 1    ketoconazole (NIZORAL) 2 % cream Apply topically 2 (two) times daily. For dry flaky patches of ear. 30 g 1    latanoprost 0.005 % ophthalmic solution Place 1 drop into both eyes every evening. 2.5 mL 12    levETIRAcetam (KEPPRA) 500 MG Tab Take 1 tablet (500 mg total) by mouth 2 (two) times daily. 180 tablet 3    magnesium oxide (MAG-OX) 400 mg (241.3 mg magnesium) tablet Take 1 tablet (400 mg total) by mouth once daily. 90 tablet 1    metoprolol tartrate (LOPRESSOR) 100 MG tablet Take 100 mg by mouth 2 (two) times daily.      montelukast (SINGULAIR) 10 mg tablet TAKE 1 TABLET BY MOUTH ONCE A DAY 90 tablet 3    MYRBETRIQ 50 mg Tb24 TAKE 1 TABLET BY MOUTH ONCE DAILY 30 tablet 0    nabumetone (RELAFEN) 500 MG tablet Take 1 tablet (500 mg total) by mouth 2 (two) times daily. 60 tablet 2    nitroGLYCERIN (NITROSTAT) 0.4 MG SL tablet DISSOLVE 1 TABLET UNDER TONGUE EVERY 5 MINUTES FOR CHEST PAIN (MAY TAKE UP TO 3 DOSES THEN SEEK MEDICAL ATTENTION) 25 tablet 0    omeprazole (PRILOSEC) 40 MG capsule Take 40 mg by mouth every morning.      ondansetron (ZOFRAN-ODT) 4 MG TbDL Take 1 tablet (4 mg total) by mouth every 6 (six) hours as needed. 30 tablet 0    pantoprazole (PROTONIX) 40 MG tablet Take 40 mg by mouth 2 (two) times daily.      potassium chloride SA (K-DUR,KLOR-CON) 20 MEQ tablet Take 1 tablet (20 mEq total) by mouth once daily. Take extra dose with Lasix - fluid pill 90 tablet 1    ranolazine (RANEXA) 1,000 mg Tb12 TAKE 1 TABLET BY MOUTH TWICE DAILY 180 tablet 3    tiZANidine (ZANAFLEX) 4 MG tablet Take 4 mg by mouth every 8 (eight) hours.      triamcinolone acetonide 0.025% (KENALOG) 0.025 % cream Apply topically 2 (two) times daily. PRN rash and itching of ear. Mild steroid cream. 30 g 0    HYDROcodone-acetaminophen (NORCO)  "5-325 mg per tablet Take 1 tablet by mouth every 6 (six) hours as needed for Pain.      linezolid (ZYVOX) 600 mg Tab Take 1 tablet (600 mg total) by mouth every 12 (twelve) hours. 14 tablet 0     No current facility-administered medications on file prior to visit.       Review of Systems   HENT:  Positive for congestion.    Respiratory:  Positive for cough and wheezing.        Vitals:    12/22/23 0737   BP: 104/70   Pulse: 79   Temp: 97.2 °F (36.2 °C)   TempSrc: Temporal   SpO2: 98%   Weight: 49.9 kg (110 lb 0.2 oz)   Height: 5' 4" (1.626 m)       Wt Readings from Last 3 Encounters:   12/22/23 49.9 kg (110 lb 0.2 oz)   11/24/23 49.7 kg (109 lb 10.9 oz)   10/25/23 48.1 kg (105 lb 14.9 oz)       Physical Exam  Vitals and nursing note reviewed.   Constitutional:       Appearance: She is well-developed.      Comments: Frail    HENT:      Head: Normocephalic and atraumatic.   Eyes:      Conjunctiva/sclera: Conjunctivae normal.      Pupils: Pupils are equal, round, and reactive to light.   Cardiovascular:      Rate and Rhythm: Normal rate and regular rhythm.      Heart sounds: Normal heart sounds.   Pulmonary:      Effort: Pulmonary effort is normal.      Breath sounds: Examination of the right-lower field reveals wheezing. Examination of the left-lower field reveals wheezing. Wheezing present.   Musculoskeletal:      Cervical back: Normal range of motion and neck supple.      Comments: Uses a walker    Skin:     General: Skin is warm and dry.   Neurological:      Mental Status: She is alert and oriented to person, place, and time.   Psychiatric:         Behavior: Behavior normal.         Thought Content: Thought content normal.         Judgment: Judgment normal.       Health Maintenance   Topic Date Due    DEXA Scan  01/28/2023    Hemoglobin A1c  02/29/2024    Eye Exam  07/27/2024    Lipid Panel  08/31/2024    TETANUS VACCINE  11/03/2031    Hepatitis C Screening  Completed    Shingles Vaccine  Completed     "

## 2023-12-27 ENCOUNTER — PATIENT MESSAGE (OUTPATIENT)
Dept: HEMATOLOGY/ONCOLOGY | Facility: CLINIC | Age: 78
End: 2023-12-27

## 2023-12-27 ENCOUNTER — TELEPHONE (OUTPATIENT)
Dept: FAMILY MEDICINE | Facility: CLINIC | Age: 78
End: 2023-12-27
Payer: MEDICARE

## 2023-12-27 DIAGNOSIS — N18.30 STAGE 3 CHRONIC KIDNEY DISEASE, UNSPECIFIED WHETHER STAGE 3A OR 3B CKD: Primary | ICD-10-CM

## 2023-12-27 DIAGNOSIS — B37.0 THRUSH: Primary | ICD-10-CM

## 2023-12-27 RX ORDER — ROFLUMILAST 500 UG/1
1 TABLET ORAL
Qty: 90 TABLET | Refills: 3 | Status: SHIPPED | OUTPATIENT
Start: 2023-12-27

## 2023-12-27 RX ORDER — NYSTATIN 100000 [USP'U]/ML
4 SUSPENSION ORAL 4 TIMES DAILY
Qty: 160 ML | Refills: 0 | Status: SHIPPED | OUTPATIENT
Start: 2023-12-27 | End: 2024-01-06

## 2023-12-27 NOTE — TELEPHONE ENCOUNTER
I have signed for the following orders AND/OR meds.  Please call the patient and ask the patient to schedule the testing AND/OR inform about any medications that were sent. Medications have been sent to pharmacy listed below      No orders of the defined types were placed in this encounter.      Medications Ordered This Encounter   Medications    nystatin (MYCOSTATIN) 100,000 unit/mL suspension     Sig: Take 4 mLs (400,000 Units total) by mouth 4 (four) times daily. for 10 days     Dispense:  160 mL     Refill:  0         [facility] - [place] - [address] [facility]  [address] [facility]  [place] [id number]  Phone: [phone] Fax: [phone]

## 2023-12-27 NOTE — TELEPHONE ENCOUNTER
----- Message from Gillian Verdin sent at 12/27/2023 11:25 AM CST -----  Contact: [phone]  Patient is calling in regards to seeing if she can get a prescription. Please call pt back at [phone] . Thanks KB

## 2024-01-03 ENCOUNTER — TELEPHONE (OUTPATIENT)
Dept: PULMONOLOGY | Facility: CLINIC | Age: 79
End: 2024-01-03
Payer: MEDICARE

## 2024-01-03 ENCOUNTER — OFFICE VISIT (OUTPATIENT)
Dept: PULMONOLOGY | Facility: CLINIC | Age: 79
End: 2024-01-03
Payer: MEDICARE

## 2024-01-03 ENCOUNTER — HOSPITAL ENCOUNTER (OUTPATIENT)
Dept: RADIOLOGY | Facility: HOSPITAL | Age: 79
Discharge: HOME | End: 2024-01-03
Attending: NURSE PRACTITIONER
Payer: MEDICARE

## 2024-01-03 VITALS
WEIGHT: 109.38 LBS | RESPIRATION RATE: 17 BRPM | DIASTOLIC BLOOD PRESSURE: 68 MMHG | SYSTOLIC BLOOD PRESSURE: 120 MMHG | HEIGHT: 64 IN | HEART RATE: 65 BPM | OXYGEN SATURATION: 96 % | BODY MASS INDEX: 18.67 KG/M2

## 2024-01-03 DIAGNOSIS — J44.1 COPD WITH ACUTE EXACERBATION: Primary | ICD-10-CM

## 2024-01-03 DIAGNOSIS — J45.40 MODERATE PERSISTENT ASTHMA WITHOUT COMPLICATION: ICD-10-CM

## 2024-01-03 DIAGNOSIS — R06.02 SOB (SHORTNESS OF BREATH) ON EXERTION: ICD-10-CM

## 2024-01-03 DIAGNOSIS — R09.89 CHEST CONGESTION: ICD-10-CM

## 2024-01-03 DIAGNOSIS — J41.1 MUCOPURULENT CHRONIC BRONCHITIS: ICD-10-CM

## 2024-01-03 DIAGNOSIS — R05.1 ACUTE COUGH: ICD-10-CM

## 2024-01-03 DIAGNOSIS — J84.112 UIP (USUAL INTERSTITIAL PNEUMONITIS): ICD-10-CM

## 2024-01-03 DIAGNOSIS — J84.112 UIP (USUAL INTERSTITIAL PNEUMONITIS): Primary | ICD-10-CM

## 2024-01-03 PROCEDURE — 3078F DIAST BP <80 MM HG: CPT | Mod: CPTII,S$GLB,, | Performed by: NURSE PRACTITIONER

## 2024-01-03 PROCEDURE — 71046 X-RAY EXAM CHEST 2 VIEWS: CPT | Mod: TC

## 2024-01-03 PROCEDURE — 71046 X-RAY EXAM CHEST 2 VIEWS: CPT | Mod: 26,,, | Performed by: STUDENT IN AN ORGANIZED HEALTH CARE EDUCATION/TRAINING PROGRAM

## 2024-01-03 PROCEDURE — 99215 OFFICE O/P EST HI 40 MIN: CPT | Mod: S$GLB,,, | Performed by: NURSE PRACTITIONER

## 2024-01-03 PROCEDURE — 1160F RVW MEDS BY RX/DR IN RCRD: CPT | Mod: CPTII,S$GLB,, | Performed by: NURSE PRACTITIONER

## 2024-01-03 PROCEDURE — 3072F LOW RISK FOR RETINOPATHY: CPT | Mod: CPTII,S$GLB,, | Performed by: NURSE PRACTITIONER

## 2024-01-03 PROCEDURE — 99999 PR PBB SHADOW E&M-EST. PATIENT-LVL V: CPT | Mod: PBBFAC,,, | Performed by: NURSE PRACTITIONER

## 2024-01-03 PROCEDURE — 1159F MED LIST DOCD IN RCRD: CPT | Mod: CPTII,S$GLB,, | Performed by: NURSE PRACTITIONER

## 2024-01-03 PROCEDURE — 3074F SYST BP LT 130 MM HG: CPT | Mod: CPTII,S$GLB,, | Performed by: NURSE PRACTITIONER

## 2024-01-03 RX ORDER — PROMETHAZINE HYDROCHLORIDE AND DEXTROMETHORPHAN HYDROBROMIDE 6.25; 15 MG/5ML; MG/5ML
5 SYRUP ORAL EVERY 6 HOURS PRN
Qty: 240 ML | Refills: 0 | Status: SHIPPED | OUTPATIENT
Start: 2024-01-03

## 2024-01-03 RX ORDER — GUAIFENESIN 1200 MG/1
1200 TABLET, EXTENDED RELEASE ORAL 2 TIMES DAILY
Qty: 60 TABLET | Refills: 1 | Status: SHIPPED | OUTPATIENT
Start: 2024-01-03 | End: 2024-01-13

## 2024-01-03 RX ORDER — LEVOFLOXACIN 500 MG/1
500 TABLET, FILM COATED ORAL DAILY
Qty: 10 TABLET | Refills: 0 | Status: SHIPPED | OUTPATIENT
Start: 2024-01-03 | End: 2024-01-13

## 2024-01-03 RX ORDER — PREDNISONE 10 MG/1
TABLET ORAL
Qty: 40 TABLET | Refills: 0 | Status: SHIPPED | OUTPATIENT
Start: 2024-01-03

## 2024-01-03 RX ORDER — CARVEDILOL 25 MG/1
25 TABLET ORAL 2 TIMES DAILY
COMMUNITY
Start: 2023-12-27

## 2024-01-03 RX ORDER — SODIUM CHLORIDE FOR INHALATION 3 %
4 VIAL, NEBULIZER (ML) INHALATION
Qty: 360 ML | Refills: 11 | Status: SHIPPED | OUTPATIENT
Start: 2024-01-03

## 2024-01-03 NOTE — PROGRESS NOTES
Subjective:      Established patient    Patient ID: Shireen Felix is a 78 y.o. female.      Chief Complaint: Asthma, Cough, and chest xray rev      HPI   1/3/2024 Sravanthi BENTLEY  Shireen Felix here for acute care visit related to cough with frequent COPD/Bronchitis exacerbations since November 25, 2023. With hospital visits, 11/25/23, 12/18/23,  12/27/23. And 12/22/23 primary care provider visit.  3 courses antibiotic last cefdinir. Patient has antibiotic allergies to PCN, Doxy, Sulfa.  She reports same continued cough since late November 2023 with continued chest congestion.  Current regimen: Trelegy 200 mcg. Albuterol inhaler. Albuterol nebs. Daliresp.   Begin Levaquin 500 mg x 10 days, prednisone 20 mg x 10 days, then 10 mg x 20 days.  3% NACL nebs 2-3 times a day, begin with twice daily progress to 3Xdaily if tolerated.   (See receipt per discharge instructions).   Continue Albuterol nebs twice daily.     1/3/2024 chest xray chronic ILD findings with scarring otherwise lungs clear.        5/16/2023 Sravanthi NP  Shireen Felix female here for follow up on asthma review cpft/cxr/FeNO.     5/16/2023 CPFT Spirometry is normal. Lung volumes are normal. Mild reduced diffusing capacity. MVV is mildly decreased    5/16/2023 FeNO is 11, no inflammation of lungs.     5/16/2023 chest x-ray chronic interstitial findings, stable.    Current regimen: Trelegy 200 mcg. Albuterol inhaler. Albuterol nebs. Daliresp.     Patient reports stable no increased cough.  Areas complaint of chronic fatigue.  Her complaint of chronic fatigue pain.  Patient was worked up for sleep apnea no sleep apnea detected no significant hypoxemia seen during sleep.  She has complaint of noticing elevations in her blood pressure which she recently went to the emergency room on 05/13/2023.  She was noted to have mild congestive heart failure with elevated BNP and lower than normal sodium.  She has follow-up with Cardiology 5/17/2023.      4/26/2023 Sravanthi BENTLEY   Shireen Felix is a 78 y.o. female presents for follow up on asthma, SOB, Chronic UIP, chronic bronchitis, pulmonary nodules with review CT chest and 6MWD.  She also has been referred by neurology department for sleep apnea evaluation.     She has complaint fall yesterday with right rib pain. 4/26/2023 CT chest healing 7th and 8th rib fx, no pneumothorax.     She states her neurologist wanted her to have sleep study, Susan Contreras NP ordered sleep study on 4/12/2023 awaiting scheduling with sleep lab.     4/26/2023 CT Chest There are noncalcified pulmonary nodules in both lungs.  One of the larger ones measures 5 mm and is located in the right lower lobe.  On the prior examination it measured 6 mm.  There is a mild amount of honeycombing in the peripheral portion of both lungs.  There is no pneumothorax or pleural effusion.  10/26/2022 CPFT Normal spirometry. Normal lung volumes. Moderate reduction in diffusion capacity.     She is followed in pulmonary for chronic UIP, moderate persistent asthma with chronic bronchitis.     Patients reports stable NYHA II dyspnea     + covid 19 12/31/2021, 2/24/2022, July 2022.     The patient does not have currently have symptoms.     She is remains improved since on Trelegy 200 mcg      Her current respiratory therapy regimen is TRELEGY 200 mcg. VENTOLIN inhaler, albuterol nebs, Daliresp 500 mcg.  She is  adherent with her regimen.      42 pack year former smoker. Quit 2004.         Asthma Control Test  In the past 4  weeks, how much of the time did your asthma keep you from getting as much done at work, school or at home?: Some of the time  During the past 4 weeks, how often have you had shortness of breath?: More than once a day  During the past 4 weeks, how often did your asthma symptoms (wheezing, couging, shortness of breath, chest tightness or pain) wake you up at night or earlier than usual in the morning?: 4 or more nights a week  During the  past 4 weeks, how often have you used your rescue inhaler or nebulizer medication (such as albuterol)?: 3 or more times per day  How would you rate your asthma control during the past 4 weeks?: Not controlled at all  If your score is 19 or less, your asthma may not be under control: 7      Immunization status reviewed and up to date.      Patient has been referred by Susan Contreras NP, Neurology for sleep apnea evaluation.   Sleep Apnea evaluation  Patient has been observed snoring with frequent awakening. History of seizures, stroke, cerebral aneurysm with repair.   Patient reports non restful' sleep.  She denies morning headache.   She reports day time napping.  Day time napping duration 15 Minutes  She denies recent weight gain.  Cardiovascular risk factors: hypertension  Bed time is 1000, 1200  Wake time is 0500  Sleep onset is within  1 Hour.  Sleep maintenance difficulties related to early morning awakening, frequent night time awakening, difficulty falling asleep, and non-restful sleep  Wake after sleep onset occurs five times a night.  Nocturia occurs one time a night,   Sleep aids : Yes, melatonin  Dry mouth : No  Sleep walking: No  Sleep talking : No  Sleep eating:No  Vivid Dreams : No  Cataplexy : No    [place] Sleepiness Scale       4/26/2023    10:25 AM   EPWORTH SLEEPINESS SCALE   Sitting and reading 1   Watching TV 3   Sitting, inactive in a public place (e.g. a theatre or a meeting) 0   As a passenger in a car for an hour without a break 0   Lying down to rest in the afternoon when circumstances permit 2   Sitting and talking to someone 0   Sitting quietly after a lunch without alcohol 1   In a car, while stopped for a few minutes in traffic 0   Total score 7       Neck circumference is 13.  Mallampati score 2    STOP - BANG Questionnaire:   1. Snoring : Do you snore loudly ?     YES  2. Tired : Do you often feel tired, fatigued, or sleepy during daytime?   YES  3. Observed: Has anyone observed you  stop breathing during your sleep?    NO  4. Blood pressure : Do you have or are you being treated for high blood pressure?    YES  5. BMI :BMI more than 35 kg/m2?   NO  6. Age : Age over 50 yr old?    YES  7. Neck circumference: Neck circumference greater than 40 cm?   NO  8. Gender: Gender male?   NO    SCORE: 4    High risk of MEENA: Yes 5 - 8  Intermediate risk of MEENA: Yes 3 - 4  Low risk of MEENA: Yes 0 - 2       A full  review of systems, past , family  and social histories was performed except as mentioned in the note above, these are non contributory to the main issues discussed today.     Previous Report Reviewed: lab reports, office notes and radiology reports     The following portions of the patient's history were reviewed and updated as appropriate: She  has a past medical history of Abnormal ECG (8/5/2019), Aneurysm, Anticoagulant long-term use, Arthritis, CAD in native artery (8/5/2019), COPD (chronic obstructive pulmonary disease), Diabetes mellitus, Fall (10/10/2019), Glaucoma, Hypertension, Seizures, Shingles (05/27/2017), and Stroke.  She  has a past surgical history that includes Brain surgery; Hysterectomy; Transforaminal epidural injection of steroid (Bilateral, 07/23/2019); Cardiac catheterization; Coronary angioplasty; Epidural steroid injection into lumbar spine (Bilateral, 11/12/2019); Injection of anesthetic agent around medial branch nerves innervating lumbar facet joint (Bilateral, 01/31/2020); Transforaminal epidural injection of steroid (Bilateral, 03/10/2020); Transforaminal epidural injection of steroid (Right, 06/19/2020); Injection of joint (Bilateral, 07/09/2020); Injection of anesthetic agent into sacroiliac joint (Right, 11/16/2021); Selective injection of anesthetic agent around lumbar spinal nerve root by transforaminal approach (Bilateral, 04/26/2023); Injection of joint (Bilateral, 07/28/2023); Injection of anesthetic agent into sacroiliac joint (Bilateral, 07/28/2023);  Colonoscopy (N/A, 09/21/2023); Oophorectomy; and Injection of joint (N/A, 10/25/2023).  Her family history includes Breast cancer in her maternal aunt, maternal aunt, and maternal aunt; No Known Problems in her father and mother.  She  reports that she quit smoking about 19 years ago. Her smoking use included cigarettes. She started smoking about 62 years ago. She has a 42.3 pack-year smoking history. She has quit using smokeless tobacco. She reports that she does not drink alcohol and does not use drugs.  She has a current medication list which includes the following prescription(s): albuterol, albuterol, aptiom, atorvastatin, carvedilol, carvedilol, cholecalciferol (vitamin d3), cholestyramine, clopidogrel, denosumab, dicyclomine, diltiazem, dorzolamide-timolol 2-0.5%, escitalopram oxalate, fluocinonide, fluticasone propionate, trelegy ellipta, furosemide, gabapentin, hydrocodone-acetaminophen, hydroxyzine hcl, ipratropium, isosorbide mononitrate, ketoconazole, latanoprost, levetiracetam, linezolid, magnesium oxide, metoprolol tartrate, montelukast, nabumetone, nitroglycerin, nystatin, omeprazole, ondansetron, pantoprazole, potassium chloride sa, ranolazine, roflumilast, tizanidine, triamcinolone acetonide 0.025%, guaifenesin, levofloxacin, myrbetriq, prednisone, promethazine-dextromethorphan, and sodium chloride 3%.  She is allergic to penicillins, adhesive, doxycycline, oxycodone, and sulfa (sulfonamide antibiotics)..    Review of Systems   Constitutional:  Negative for fever, chills and fatigue.   HENT:  Positive for hearing loss. Negative for nosebleeds, rhinorrhea and congestion.    Eyes:  Negative for redness.   Respiratory:  Positive for snoring, cough and use of rescue inhaler. Negative for sputum production, choking, wheezing and dyspnea on extertion.    Genitourinary:  Negative for hematuria.   Endocrine: Diabetes melllitus Negative for cold intolerance.    Musculoskeletal:  Positive for arthralgias and  "back pain.   Skin:  Negative for rash.   Gastrointestinal:  Positive for acid reflux. Negative for vomiting.   Neurological:  Negative for syncope and headaches.        History of stroke  Seizure disorder.  Post herpetic neuralgia   Hematological:  Negative for adenopathy. Bleeds easily and excessive bruising.   Psychiatric/Behavioral:  Negative for confusion.         Objective:     /68   Pulse 65   Resp 17   Ht 5' 4" (1.626 m)   Wt 49.6 kg (109 lb 5.6 oz)   LMP  (LMP Unknown)   SpO2 96%   BMI 18.77 kg/m²   Body mass index is 18.77 kg/m².     Physical Exam  Vitals and nursing note reviewed.   Constitutional:       General: She is not in acute distress.     Appearance: Normal appearance. She is well-developed. She is not ill-appearing or toxic-appearing.   HENT:      Head: Normocephalic and atraumatic.      Right Ear: Tympanic membrane and external ear normal.      Left Ear: Tympanic membrane and external ear normal.      Nose: Nose normal.      Mouth/Throat:      Mouth: Mucous membranes are moist.      Tongue: No lesions.      Pharynx: Oropharynx is clear. Uvula midline. No oropharyngeal exudate.      Tonsils: No tonsillar exudate or tonsillar abscesses.   Eyes:      Conjunctiva/sclera: Conjunctivae normal.      Pupils: Pupils are equal, round, and reactive to light.   Cardiovascular:      Rate and Rhythm: Normal rate and regular rhythm.      Heart sounds: Normal heart sounds. No murmur heard.  Pulmonary:      Effort: Pulmonary effort is normal. No respiratory distress.      Breath sounds: Normal breath sounds. No stridor.      Comments: Velcro breath sounds in posterior bases.  Abdominal:      General: Bowel sounds are normal.      Palpations: Abdomen is soft.   Musculoskeletal:         General: No tenderness. Normal range of motion.      Cervical back: Normal range of motion and neck supple.   Lymphadenopathy:      Cervical: No cervical adenopathy.   Skin:     General: Skin is warm and dry.      " Coloration: Skin is not pale.   Neurological:      Mental Status: She is alert and oriented to person, place, and time.   Psychiatric:         Behavior: Behavior normal. Behavior is cooperative.         Thought Content: Thought content normal.         Judgment: Judgment normal.         Personal Diagnostic Review    5/16/2023 complete PFT Spirometry is normal. Spirometry remains unimproved following bronchodilator. Lung volume determination is normal. Airway mechanics show normal airway resistance and conductance. DLCO is mildly decreased. MVV is mildly decreased    5/16/2023 FeNO is 11, no inflammation of lungs.   Clinical Guide to Interpretation or FeNO Levels :     FeNO  (ppb) LOW INTERMEDIATE HIGH   ADULT VALUES < 25 25-50          > 50   Th2-driven Inflammation Unlikely Likely Significant      Patients FeNO level at this visit : __11__ (ppb)     Interpretation of FeNO measurement in adults:  [x] FENO is less than 25 ppb implies non eosinophilic airway inflammation or the absence of airway inflammation.               Comment: Low FENO (<25 ppb) in adult asthmatics with persistent symptoms suggests other etiologies for these symptoms and a lower likelihood of benefit from adding or increasing inhaled glucocorticoids.    4/26/2023 6MWD No desaturations requiring supplemental oxygen at rest or exertion.  Exercise capacity is less than predicted  Phase Oxygen Assessment Supplemental O2 Heart   Rate Blood Pressure Paulino Dyspnea Scale Rating   Resting 100 % Room Air 61 bpm 173/81 1   Exercise             Minute             1 100 % Room Air 67 bpm       2 100 % Room Air 70 bpm       3 100 % Room Air 69 bpm       4 100 % Room Air 69 bpm       5 100 % Room Air 69 bpm       6  100 % Room Air 69 bpm 186/76 3   Recovery             Minute             1 100 % Room Air 65 bpm       2 100 % Room Air 64 bpm       3 100 % Room Air 62 bpm       4 100 % Room Air 63 bpm 176/76 3      Six Minute Walk Summary  6MWT Status: completed  without stopping  Patient Reported: Dyspnea, Leg pain (hip pain)             Interpretation:  Did the patient stop or pause?: No  Total Time Walked (Calculated): 360 seconds  Final Partial Lap Distance (feet): 50 feet  Total Distance Meters (Calculated): 198.12 meters  Predicted Distance Meters (Calculated): 430.2 meters  Percentage of Predicted (Calculated): 46.05  Peak VO2 (Calculated): 9.92  Mets: 2.83  Has The Patient Had a Previous Six Minute Walk Test?: No     Previous 6MWT Results  Has The Patient Had a Previous Six Minute Walk Test?: No      10/26/2022 CPFT Normal spirometry. Normal lung volumes. Moderate reduction in diffusion capacity - unadjusted for hemoglobin. (DLCO > or equal to 40% and < 60% predicted). This interpretation of diffusing capacity does not take into account the patient's hemoglobin level (Unavailable at the time of testing - hemoglobin assumed to be normal). Flow volume loops demonstrate a restrictive defect.     X-Ray Chest PA And Lateral  Narrative: EXAM: XR CHEST PA AND LATERAL    CLINICAL HISTORY: [J84.112]-Idiopathic pulmonary fibrosis./[R06.02]-Shortness of breath.    COMPARISON STUDIES: None.  TECHNIQUE:  2 views chest radiographs    FINDINGS:  The heart size is normal.  The mediastinal silhouette is within normal limits.    Subtle peripheral opacity stable from prior exam, possibly related to scarring in this patient with the given history of idiopathic pulmonary fibrosis.  Otherwise the lungs are clear.    Degenerative change of the shoulders.  Impression: As above.    Finalized on: 1/3/2024 7:53 AM By:  Shane Jones MD  R# [id number]      2024 07:55:[id number]    BRLEE      Pulmonary function tests:20 :  FEV1: 2.13 (103.5 % predicted), FVC:  2.44 (91.0 % predicted), FEV1/FVC:  87.75, T.47 ( 90.1 % predicted) RV /TLC 45 ( 102% predicted, DLCO: 14.51 (70.2 % predicted)  Normal spirometry. Lung volumes not done. Diffusion capacity is mildly reduced but corrects for  alveolar volume.       Assessment / Plan:       Discussed diagnosis, its evaluation, treatment and usual course. All questions answered.  Requested Prescriptions     Signed Prescriptions Disp Refills    predniSONE (DELTASONE) 10 MG tablet 40 tablet 0     Si tabs x 10 days. 1 daily x 20 days.    levoFLOXacin (LEVAQUIN) 500 MG tablet 10 tablet 0     Sig: Take 1 tablet (500 mg total) by mouth once daily. for 10 days    promethazine-dextromethorphan (PROMETHAZINE-DM) 6.25-15 mg/5 mL Syrp 240 mL 0     Sig: Take 5 mLs by mouth every 6 (six) hours as needed (cough).    guaiFENesin (MUCINEX) 1,200 mg Ta12 60 tablet 1     Sig: Take 1,200 mg by mouth 2 (two) times a day. for 10 days    sodium chloride 3% 3 % nebulizer solution 360 mL 11     Sig: Take 4 mLs by nebulization as needed for Cough (chest congestion).       Problem List Items Addressed This Visit       Mucopurulent chronic bronchitis (Chronic)    Current Assessment & Plan     mucopurulent bronchitis flare since late 2023.  continue present plan and medications Trelegy 200 mcg triple therapy. Daliresp, Albuterol inhaler, albuterol nebs.   Begin levaquin 500 mg x 10 days, prednisone 20 mg x 10 days, then 10 mg x 20 days.  3% NACL nebs 2-3 times a day, begin with twice daily progress to 3Xdaily if tolerated.   Begin Mucinex dm 1200 mg twice daily   Refilled promethazine dm.    Continue Albuterol nebs twice daily.   Exam lungs mild velcro breath sounds in posterior bases.  Congested sounding non prod cough while in clinic  1/3/2024 chest xray chronic ILD findings with scarring otherwise lungs clear.   Follow up as william May 2024 w/CPFT as scheduled. fu sooner if not gradually improved with tx            Relevant Medications    guaiFENesin (MUCINEX) 1,200 mg Ta12    sodium chloride 3% 3 % nebulizer solution    Moderate persistent asthma without complication    Overview     Trelegy 200 mcg triple therapy. Daliresp, Albuterol inhaler, albuterol nebs.      5/16/2023 FeNO is 11, no inflammation of lungs.    5/16/2023 complete PFT Spirometry is normal. Spirometry remains unimproved following bronchodilator. Lung volume determination is normal. Airway mechanics show normal airway resistance and conductance. DLCO is mildly decreased. MVV is mildly decreased                Current Assessment & Plan     Asthma with mucopurulent bronchitis flare since late November 2023.  continue present plan and medications Trelegy 200 mcg triple therapy. Daliresp, Albuterol inhaler, albuterol nebs.   Begin levaquin 500 mg x 10 days, prednisone 20 mg x 10 days, then 10 mg x 20 days.  3% NACL nebs 2-3 times a day, begin with twice daily progress to 3Xdaily if tolerated.   Begin Mucinex dm 1200 mg twice daily   Refilled promethazine dm.    Continue Albuterol nebs twice daily.   Exam lungs clear to auscultation.  Congested sounding non prod cough while in clinic  1/3/2024 chest xray chronic ILD findings with scarring otherwise lungs clear.   Follow up as william May 2024 w/CPFT as scheduled. fu sooner if not gradually improved with tx             Other Visit Diagnoses       COPD with acute exacerbation    -  Primary    Relevant Medications    predniSONE (DELTASONE) 10 MG tablet    levoFLOXacin (LEVAQUIN) 500 MG tablet    Acute cough        Relevant Medications    promethazine-dextromethorphan (PROMETHAZINE-DM) 6.25-15 mg/5 mL Syrp    Chest congestion        Relevant Medications    guaiFENesin (MUCINEX) 1,200 mg Ta12    sodium chloride 3% 3 % nebulizer solution            I spent a total of 46 minutes on the day of the visit.  This includes face to face time and non-face to face time preparing to see the patient (eg, review of tests), obtaining and/or reviewing separately obtained history, documenting clinical information in the electronic or other health record, independently interpreting results and communicating results to the patient/family/caregiver, or care coordinator.    Follow up in  about 4 months (around 5/14/2024) for COPD, Asthma CPFT  as scheduled. fu sooner if not gradually improved with tx .

## 2024-01-03 NOTE — ASSESSMENT & PLAN NOTE
mucopurulent bronchitis flare since late November 2023.  continue present plan and medications Trelegy 200 mcg triple therapy. Daliresp, Albuterol inhaler, albuterol nebs.   Begin levaquin 500 mg x 10 days, prednisone 20 mg x 10 days, then 10 mg x 20 days.  3% NACL nebs 2-3 times a day, begin with twice daily progress to 3Xdaily if tolerated.   Begin Mucinex dm 1200 mg twice daily   Refilled promethazine dm.    Continue Albuterol nebs twice daily.   Exam lungs mild velcro breath sounds in posterior bases.  Congested sounding non prod cough while in clinic  1/3/2024 chest xray chronic ILD findings with scarring otherwise lungs clear.   Follow up as william May 2024 w/CPFT as scheduled. fu sooner if not gradually improved with tx

## 2024-01-03 NOTE — ASSESSMENT & PLAN NOTE
Asthma with mucopurulent bronchitis flare since late November 2023.  continue present plan and medications Trelegy 200 mcg triple therapy. Daliresp, Albuterol inhaler, albuterol nebs.   Begin levaquin 500 mg x 10 days, prednisone 20 mg x 10 days, then 10 mg x 20 days.  3% NACL nebs 2-3 times a day, begin with twice daily progress to 3Xdaily if tolerated.   Begin Mucinex dm 1200 mg twice daily   Refilled promethazine dm.    Continue Albuterol nebs twice daily.   Exam lungs clear to auscultation.  Congested sounding non prod cough while in clinic  1/3/2024 chest xray chronic ILD findings with scarring otherwise lungs clear.   Follow up as william May 2024 w/CPFT as scheduled. fu sooner if not gradually improved with tx

## 2024-01-05 ENCOUNTER — PATIENT MESSAGE (OUTPATIENT)
Dept: PULMONOLOGY | Facility: CLINIC | Age: 79
End: 2024-01-05
Payer: MEDICARE

## 2024-01-05 ENCOUNTER — TELEPHONE (OUTPATIENT)
Dept: PULMONOLOGY | Facility: CLINIC | Age: 79
End: 2024-01-05
Payer: MEDICARE

## 2024-01-05 DIAGNOSIS — T36.95XA ANTIBIOTIC-INDUCED YEAST INFECTION: Primary | ICD-10-CM

## 2024-01-05 DIAGNOSIS — B37.9 ANTIBIOTIC-INDUCED YEAST INFECTION: Primary | ICD-10-CM

## 2024-01-05 NOTE — TELEPHONE ENCOUNTER
----- Message from Sue Palma LPN sent at 1/4/2024  4:55 PM CST -----  Contact: [email or web address]@ [phone]    ----- Message -----  From: Tom Go MA  Sent: 1/4/2024   4:54 PM CST  To: Sravanthi SMITH Staff    Pt called              In regards to needing something to be prescribed for  a vaginal yeast infection. pt stated that she has been on a lot of antibiotics that caused the yeast infection.       [facility] [place] [address] [address]  [address] [address] [address]  Phone: [phone] Fax: [phone]  Hours: Not open 24 hours

## 2024-01-05 NOTE — TELEPHONE ENCOUNTER
Requested Prescriptions     Signed Prescriptions Disp Refills    oteseconazole 150 mg Cap 3 capsule 1     Sig: Take 150 mg by mouth every 72 hours. X 3 doses     Authorizing Provider: HARIS GILLETTE     1. Antibiotic-induced yeast infection  oteseconazole 150 mg Cap

## 2024-01-08 DIAGNOSIS — L29.9 PRURITUS: ICD-10-CM

## 2024-01-09 RX ORDER — HYDROXYZINE HYDROCHLORIDE 25 MG/1
25 TABLET, FILM COATED ORAL
Qty: 30 TABLET | Refills: 2 | Status: SHIPPED | OUTPATIENT
Start: 2024-01-09

## 2024-01-11 ENCOUNTER — TELEPHONE (OUTPATIENT)
Dept: FAMILY MEDICINE | Facility: CLINIC | Age: 79
End: 2024-01-11
Payer: MEDICARE

## 2024-01-11 ENCOUNTER — TELEPHONE (OUTPATIENT)
Dept: PULMONOLOGY | Facility: CLINIC | Age: 79
End: 2024-01-11
Payer: MEDICARE

## 2024-01-11 ENCOUNTER — TELEPHONE (OUTPATIENT)
Dept: PRIMARY CARE CLINIC | Facility: CLINIC | Age: 79
End: 2024-01-11
Payer: MEDICARE

## 2024-01-11 DIAGNOSIS — R05.9 COUGH, UNSPECIFIED TYPE: Primary | ICD-10-CM

## 2024-01-11 NOTE — TELEPHONE ENCOUNTER
----- Message from Anny Lofton sent at 1/11/2024  9:33 AM CST -----  Contact: self  [phone]  Patient called in stating she will give it another day. That is all the information that she would give. Please call back  [phone]. Thanks tpw

## 2024-01-11 NOTE — TELEPHONE ENCOUNTER
----- Message from Lesa Trudy sent at 1/11/2024  9:53 AM CST -----  Regarding: pt called  Name of Who is Calling: IZABELLA SHEA [[id number]]      What is the request in detail: pt still is having issues with congestion and is requesting the Dr to refill her medication.Pt states she was feeling better for a couple of days , but she started feeling bad again.  Please advise     [facility] - [place] [address]  [address] [address] [place] [address]  Phone: [phone] Fax: [phone]              Can the clinic reply by [facility]: No       [facility] Telephone Information:          [phone]

## 2024-01-11 NOTE — TELEPHONE ENCOUNTER
----- Message from Jo-Ann Chand sent at 1/11/2024  8:44 AM CST -----  Who Called: Pt    What is the request in detail: Requesting call back to discuss call about rx or is she'll need to be scheduled. Please advise    Can the clinic reply by [facility]? No    Best Call Back Number:  [phone]      Additional Information:

## 2024-01-11 NOTE — TELEPHONE ENCOUNTER
Spoke with pt, advised her that we see that she just saw her pulmonologist on 1/3 for these symptoms and that we don't mind seeing her but it would be best to also follow up with pulmonology to let them know she is still having the same symptoms. Pt states she will call back if she still needs an appointment.

## 2024-01-16 ENCOUNTER — TELEPHONE (OUTPATIENT)
Dept: ORTHOPEDICS | Facility: CLINIC | Age: 79
End: 2024-01-16
Payer: MEDICARE

## 2024-01-16 DIAGNOSIS — M25.511 RIGHT SHOULDER PAIN, UNSPECIFIED CHRONICITY: Primary | ICD-10-CM

## 2024-01-16 NOTE — TELEPHONE ENCOUNTER
Spoke to the patient an was able to get her scheduled for Thursday with Jennifer Atkins PA-C per her request.       ----- Message from Selene Cavanaugh sent at 1/16/2024  7:36 AM CST -----  Contact: Shireen  Type:  Same Day Appointment Request    Caller is requesting a same day appointment.     Name of Caller:Shireen  When is the first available appointment?unknown  Symptoms:right should and hand injection  Would the patient rather a call back or a response via [facility]? Call  Best Call Back Number:[phone] or [phone]   Additional Information: Patient reports experiencing severe pain and request to schedule for injections. Please give patient an immediate call back to assist.   Thank you,  GH

## 2024-01-30 ENCOUNTER — OFFICE VISIT (OUTPATIENT)
Dept: ORTHOPEDICS | Facility: CLINIC | Age: 79
End: 2024-01-30
Payer: MEDICARE

## 2024-01-30 VITALS — HEIGHT: 64 IN | WEIGHT: 109 LBS | BODY MASS INDEX: 18.61 KG/M2

## 2024-01-30 DIAGNOSIS — M18.0 ARTHRITIS OF CARPOMETACARPAL (CMC) JOINT OF BOTH THUMBS: Primary | ICD-10-CM

## 2024-01-30 DIAGNOSIS — G56.03 CARPAL TUNNEL SYNDROME ON BOTH SIDES: ICD-10-CM

## 2024-01-30 PROCEDURE — 3072F LOW RISK FOR RETINOPATHY: CPT | Mod: CPTII,S$GLB,,

## 2024-01-30 PROCEDURE — 1101F PT FALLS ASSESS-DOCD LE1/YR: CPT | Mod: CPTII,S$GLB,,

## 2024-01-30 PROCEDURE — 1125F AMNT PAIN NOTED PAIN PRSNT: CPT | Mod: CPTII,S$GLB,,

## 2024-01-30 PROCEDURE — 1159F MED LIST DOCD IN RCRD: CPT | Mod: CPTII,S$GLB,,

## 2024-01-30 PROCEDURE — 3288F FALL RISK ASSESSMENT DOCD: CPT | Mod: CPTII,S$GLB,,

## 2024-01-30 PROCEDURE — 99214 OFFICE O/P EST MOD 30 MIN: CPT | Mod: S$GLB,,,

## 2024-01-30 PROCEDURE — 99999 PR PBB SHADOW E&M-EST. PATIENT-LVL IV: CPT | Mod: PBBFAC,,,

## 2024-01-30 RX ORDER — IBUPROFEN 800 MG/1
800 TABLET ORAL 3 TIMES DAILY
Qty: 60 TABLET | Refills: 0 | Status: SHIPPED | OUTPATIENT
Start: 2024-01-30

## 2024-01-30 NOTE — PROGRESS NOTES
SUBJECTIVE:      Chief Complaint: Pain of the Right Hand and Pain of the Left Hand    Referring Provider: Self, Aaareferral     History of Present Illness:  The patient is a 78-year-old female who presents today with bilateral thumb basal joint pain and bilateral carpal tunnel syndrome follow up. Her last injection was 3/21/23, which gave her good relief. Her pain is 8/10 today. States that her left 1st basal joint has been red, hot, and swollen for a few days but has gotten better since onset. She reports that this is typical for her and that other joints will have these flare-ups too. Also, she states that she will drop things due to the hand numbness. She is not taking any medication for pain but states that ibuprofen 800mg has helped her in the past. She requests repeat injections today. Denies fever, chills, sweats.    Interval hx 9/15/23: Patient is a 78 y.o. female who presents today with complaints of bilateral CMC thumb joint pain. Pain is 8/10, reports difficulty with gripping. She has noticed redness to the R thumb that has gotten worse since onset 2 days ago. Her last bilateral CMC injection was on 3/21/23 with good results until recently. She would like repeat injections today. Denies hx of gout. The patient denies any fevers, chills, N/V, D/C and presents for evaluation.    Interval hx 3/21/23 (Dr. 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  She also has significant cervical radiculopathy from C4-T1 bilaterally.  She was last injected 06/01/2022 and requests reinjection thumbs and carpal tunnels today    Interval hx 6/1/22 (Dr. 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 She states that she still has pain around the CMC joint of both thumbs. She was last injected 02/16/22 with good relief until  recently and requests bilateral reinjection today. Patient also states that her right index MCP joint has been swollen and bothering her. She has thumb spica splints. Denies fever, chills, nausea, vomiting, CP, SOB.    Past Medical History:   Diagnosis Date    Abnormal ECG 8/5/2019    Aneurysm     Anticoagulant long-term use     Arthritis     CAD in native artery 8/5/2019    COPD (chronic obstructive pulmonary disease)     Diabetes mellitus     Fall 10/10/2019    Formatting of this note might be different from the original. Found sitting on floor next to bed last night Mild confusion today Does not recall falling or how she ended up on floor UA today Labs yesterday unremarkable    Glaucoma     Hypertension     Seizures     Shingles 05/27/2017    Stroke      Past Surgical History:   Procedure Laterality Date    BRAIN SURGERY      CARDIAC CATHETERIZATION      COLONOSCOPY N/A 09/21/2023    Procedure: COLONOSCOPY;  Surgeon: Joanna Leal MD;  Location: [facility];  Service: Endoscopy;  Laterality: N/A;    CORONARY ANGIOPLASTY      EPIDURAL STEROID INJECTION INTO LUMBAR SPINE Bilateral 11/12/2019    Procedure: TF XANDER L4/5;  Surgeon: Desean Dias MD;  Location: [facility];  Service: Pain Management;  Laterality: Bilateral;    HYSTERECTOMY      INJECTION OF ANESTHETIC AGENT AROUND MEDIAL BRANCH NERVES INNERVATING LUMBAR FACET JOINT Bilateral 01/31/2020    Procedure: Bilateral L3-5 MBB;  Surgeon: Desean Dias MD;  Location: [facility];  Service: Pain Management;  Laterality: Bilateral;    INJECTION OF ANESTHETIC AGENT INTO SACROILIAC JOINT Right 11/16/2021    Procedure: Right BLOCK, SACROILIAC JOINT Right GTB with RN IV sedation;  Surgeon: Yao Fulton MD;  Location: [facility];  Service: Pain Management;  Laterality: Right;    INJECTION OF ANESTHETIC AGENT INTO SACROILIAC JOINT Bilateral 07/28/2023    Procedure: Bilateral GT bursa + bilateral SIJ injection;  Surgeon: Francisco Oshea MD;  Location:  [facility];  Service: Pain Management;  Laterality: Bilateral;    INJECTION OF JOINT Bilateral 07/09/2020    Procedure: Bilateral shoulder GH Joint injection with local;  Surgeon: Desean Dias MD;  Location: [facility];  Service: Pain Management;  Laterality: Bilateral;    INJECTION OF JOINT Bilateral 07/28/2023    Procedure: Bilateral GT bursa + bilateral SIJ injection NEEDS ADDITIONAL SEDATION AND MORE TIME BEFORE INJECTION RN IV Sedation;  Surgeon: Francisco Oshea MD;  Location: [facility];  Service: Pain Management;  Laterality: Bilateral;    INJECTION OF JOINT N/A 10/25/2023    Procedure: Sacrococcygeal joint injection;  Surgeon: Francisco Oshea MD;  Location: [facility];  Service: Pain Management;  Laterality: N/A;    OOPHORECTOMY      SELECTIVE INJECTION OF ANESTHETIC AGENT AROUND LUMBAR SPINAL NERVE ROOT BY TRANSFORAMINAL APPROACH Bilateral 04/26/2023    Procedure: Bilateral L4/5 TF XANDER RN IV Sedation;  Surgeon: Francisco Oshea MD;  [facility];  Service: Pain Management;  Laterality: Bilateral;    TRANSFORAMINAL EPIDURAL INJECTION OF STEROID Bilateral 07/23/2019    Procedure: Bilateral L3/4 Transforaminal Epidural Steroid Injection;  Surgeon: Desean Dias MD;  [facility];  Service: Pain Management;  Laterality: Bilateral;    TRANSFORAMINAL EPIDURAL INJECTION OF STEROID Bilateral 03/10/2020    Procedure: Right T12/L1 TF XANDER with local;  Surgeon: Desean Dias MD;  [facility];  Service: Pain Management;  Laterality: Bilateral;    TRANSFORAMINAL EPIDURAL INJECTION OF STEROID Right 06/19/2020    Procedure: Right T12/L1 TFESI Covid day of procedure;  Surgeon: Desean Dias MD;  [facility];  Service: Pain Management;  Laterality: Right;     Review of patient's allergies indicates:   Allergen Reactions    Penicillins Anaphylaxis, Hives, Shortness Of Breath and Swelling    Adhesive Rash    Doxycycline Nausea Only    Oxycodone Other (See  Comments)     Fatigue      Sulfa (sulfonamide antibiotics) Itching     Social History     Social History Narrative    No pets or smokers in household.     Family History   Problem Relation Age of Onset    No Known Problems Mother     No Known Problems Father     Breast cancer Maternal Aunt     Breast cancer Maternal Aunt     Breast cancer Maternal Aunt          Current Outpatient Medications:     albuterol (PROVENTIL) 2.5 mg /3 mL (0.083 %) nebulizer solution, EMPTY 1 VIAL INTO NEBULIZER EVERY 4 HOURS AS NEEDED FRO RESCUE, Disp: 180 mL, Rfl: 11    albuterol (PROVENTIL/VENTOLIN HFA) 90 mcg/actuation inhaler, INHALE TWO PUFFS INTO THE LUNGS EVERY 4 HOURS AS NEEDED, Disp: 18 g, Rfl: 11    APTIOM 400 mg Tab tablet, Take 400 mg by mouth., Disp: , Rfl:     atorvastatin (LIPITOR) 40 MG tablet, TAKE 1 TABLET BY MOUTH ONCE DAILY, Disp: 90 tablet, Rfl: 1    carvediloL (COREG) 25 MG tablet, Take 25 mg by mouth 2 (two) times daily., Disp: , Rfl:     carvediloL (COREG) 6.25 MG tablet, Take 1 tablet (6.25 mg total) by mouth 2 (two) times daily with meals., Disp: 180 tablet, Rfl: 1    cholecalciferol, vitamin D3, 1,250 mcg (50,000 unit) capsule, Take 1 capsule (50,000 Units total) by mouth every 7 days., Disp: 12 capsule, Rfl: 0    cholestyramine (QUESTRAN) 4 gram packet, TAKE 1 PACKET BY MOUTH TWICE DAILY AS DIRECTED, Disp: 60 packet, Rfl: 11    clopidogreL (PLAVIX) 75 mg tablet, Take 1 tablet (75 mg total) by mouth once daily., Disp: 90 tablet, Rfl: 3    denosumab (PROLIA) 60 mg/mL Syrg, every 6 (six) months., Disp: , Rfl:     dicyclomine (BENTYL) 10 MG capsule, Take 1 capsule (10 mg total) by mouth 2 (two) times daily., Disp: 10 capsule, Rfl: 0    diltiaZEM (CARDIZEM CD) 120 MG Cp24, Take 2 capsules (240 mg total) by mouth once daily., Disp: 180 capsule, Rfl: 1    dorzolamide-timolol 2-0.5% (COSOPT) 22.3-6.8 mg/mL ophthalmic solution, Place 1 drop into both eyes every 12 (twelve) hours., Disp: 10 mL, Rfl: 12    EScitalopram  oxalate (LEXAPRO) 20 MG tablet, TAKE 1 TABLET BY MOUTH ONCE DAILY, Disp: 90 tablet, Rfl: 2    fluocinonide (LIDEX) 0.05 % external solution, Apply topically 2 (two) times daily. Use on itchy spots on scalp and ear, Disp: 60 mL, Rfl: 2    fluticasone propionate (FLONASE) 50 mcg/actuation nasal spray, USE 2 SPRAYS INTO EACH NOSTRIL ONCE A DAY AT 6AM AS DIRECTED, Disp: 16 g, Rfl: 11    fluticasone-umeclidin-vilanter (TRELEGY ELLIPTA) 200-62.5-25 mcg inhaler, INHALE 1 PUFF INTO THE LUNGS DAILY, Disp: 60 each, Rfl: 11    furosemide (LASIX) 20 MG tablet, Take 20 mg by mouth daily as needed., Disp: , Rfl:     gabapentin (NEURONTIN) 300 MG capsule, Take 1 capsule (300 mg total) by mouth 3 (three) times daily., Disp: 90 capsule, Rfl: 1    HYDROcodone-acetaminophen (NORCO) 5-325 mg per tablet, Take 1 tablet by mouth every 6 (six) hours as needed for Pain., Disp: , Rfl:     hydrOXYzine HCL (ATARAX) 25 MG tablet, Take 1 tablet (25 mg total) by mouth every evening., Disp: 30 tablet, Rfl: 2    ipratropium (ATROVENT) 42 mcg (0.06 %) nasal spray, USE 2 SPRAYS INTO EACH NOSTRIL FOUR TIMES DAILY, Disp: 15 mL, Rfl: 11    isosorbide mononitrate (IMDUR) 60 MG 24 hr tablet, Take 1 tablet (60 mg total) by mouth every evening., Disp: 90 tablet, Rfl: 1    ketoconazole (NIZORAL) 2 % cream, Apply topically 2 (two) times daily. For dry flaky patches of ear., Disp: 30 g, Rfl: 1    latanoprost 0.005 % ophthalmic solution, Place 1 drop into both eyes every evening., Disp: 2.5 mL, Rfl: 12    levETIRAcetam (KEPPRA) 500 MG Tab, Take 1 tablet (500 mg total) by mouth 2 (two) times daily., Disp: 180 tablet, Rfl: 3    linezolid (ZYVOX) 600 mg Tab, Take 1 tablet (600 mg total) by mouth every 12 (twelve) hours., Disp: 14 tablet, Rfl: 0    magnesium oxide (MAG-OX) 400 mg (241.3 mg magnesium) tablet, Take 1 tablet (400 mg total) by mouth once daily., Disp: 90 tablet, Rfl: 1    metoprolol tartrate (LOPRESSOR) 100 MG tablet, Take 100 mg by mouth 2 (two) times  daily., Disp: , Rfl:     montelukast (SINGULAIR) 10 mg tablet, TAKE 1 TABLET BY MOUTH ONCE A DAY, Disp: 90 tablet, Rfl: 3    nitroGLYCERIN (NITROSTAT) 0.4 MG SL tablet, DISSOLVE 1 TABLET UNDER TONGUE EVERY 5 MINUTES FOR CHEST PAIN (MAY TAKE UP TO 3 DOSES THEN SEEK MEDICAL ATTENTION), Disp: 25 tablet, Rfl: 0    omeprazole (PRILOSEC) 40 MG capsule, Take 40 mg by mouth every morning., Disp: , Rfl:     ondansetron (ZOFRAN-ODT) 4 MG TbDL, Take 1 tablet (4 mg total) by mouth every 6 (six) hours as needed., Disp: 30 tablet, Rfl: 0    oteseconazole 150 mg Cap, Take 150 mg by mouth every 72 hours. X 3 doses, Disp: 3 capsule, Rfl: 1    pantoprazole (PROTONIX) 40 MG tablet, Take 40 mg by mouth 2 (two) times daily., Disp: , Rfl:     potassium chloride SA (K-DUR,KLOR-CON) 20 MEQ tablet, Take 1 tablet (20 mEq total) by mouth once daily. Take extra dose with Lasix - fluid pill, Disp: 90 tablet, Rfl: 1    predniSONE (DELTASONE) 10 MG tablet, 2 tabs x 10 days. 1 daily x 20 days., Disp: 40 tablet, Rfl: 0    promethazine-dextromethorphan (PROMETHAZINE-DM) 6.25-15 mg/5 mL Syrp, Take 5 mLs by mouth every 6 (six) hours as needed (cough)., Disp: 240 mL, Rfl: 0    ranolazine (RANEXA) 1,000 mg Tb12, TAKE 1 TABLET BY MOUTH TWICE DAILY, Disp: 180 tablet, Rfl: 3    roflumilast (DALIRESP) 500 mcg Tab, TAKE ONE TABLET BY MOUTH DAILY, Disp: 90 tablet, Rfl: 3    sodium chloride 3% 3 % nebulizer solution, Take 4 mLs by nebulization as needed for Cough (chest congestion)., Disp: 360 mL, Rfl: 11    tiZANidine (ZANAFLEX) 4 MG tablet, Take 4 mg by mouth every 8 (eight) hours., Disp: , Rfl:     triamcinolone acetonide 0.025% (KENALOG) 0.025 % cream, Apply topically 2 (two) times daily. PRN rash and itching of ear. Mild steroid cream., Disp: 30 g, Rfl: 0    ibuprofen (ADVIL,MOTRIN) 800 MG tablet, Take 1 tablet (800 mg total) by mouth 3 (three) times daily., Disp: 60 tablet, Rfl: 0    MYRBETRIQ 50 mg Tb24, TAKE 1 TABLET BY MOUTH ONCE DAILY, Disp: 30  "tablet, Rfl: 0      Review of Systems:  Constitutional: Negative for chills and fever.   Respiratory: Negative for cough and shortness of breath.    Gastrointestinal: Negative for nausea and vomiting.   Skin: Negative for rash.   Neurological: Negative for dizziness and headaches.   Psychiatric/Behavioral: Negative for depression.   MSK as in HPI     OBJECTIVE:      Vital Signs (Most Recent):  Vitals:    01/30/24 1502   Weight: 49.4 kg (109 lb)   Height: 5' 4" (1.626 m)       Body mass index is 18.71 kg/m².      Physical Exam:  Constitutional: The patient appears well-developed and well-nourished. No distress.   Skin: No lesions appreciated  Head: Normocephalic and atraumatic.   Nose: Nose normal.   Ears: No deformities seen  Eyes: Conjunctivae and EOM are normal.   Neck: No tracheal deviation present.   Cardiovascular: Normal rate and intact distal pulses.    Pulmonary/Chest: Effort normal. No respiratory distress.   Abdominal: There is no guarding.   Neurological: The patient is alert.   Psychiatric: The patient has a normal mood and affect.     General    Vitals reviewed.            Right Hand/Wrist Exam     Inspection   Scars: Wrist - absent Hand -  absent  Effusion: Wrist - absent   Deformity:  Hand -  deformity (arthritic changes)    Pain   Wrist - The patient exhibits pain of the CMC and flexor/pronator group.    Tests   Phalens sign: positive  Tinel's sign (median nerve): positive  Carpal Tunnel Compression Test: positive      Other     Neuorologic Exam    Median Distribution: abnormal  Ulnar Distribution: abnormal  Radial Distribution: normal    Comments:  Tender to palpation at thumb CMC joint  + axial grind test  Skin intact, no lesions or abrasions  No motor or sensory deficits  No signs of infection      Left Hand/Wrist Exam     Inspection   Scars: Wrist - absent Hand -  absent  Effusion: Wrist - absent Hand -  absent  Deformity: Wrist - present (arthritic changes)     Pain   Wrist - The patient " exhibits pain of the CMC and flexor/pronator group.    Swelling   Wrist - The patient is swollen on the CMC.    Tests   Phalens sign: positive  Tinel's sign (median nerve): positive  Carpal Tunnel Compression Test: positive      Other     Sensory Exam  Median Distribution: abnormal  Ulnar Distribution: normal  Radial Distribution: normal    Comments:  Mild edema, erythema, warm to touch to thumb CMC joint. Tender to touch.  + axial grind test  Skin intact, no lesions or abrasions  No motor or sensory deficits          Vascular Exam       Capillary Refill  Right Hand: normal capillary refill  Left Hand: normal capillary refill               Component Ref Range & Units 2 yr ago  (1/6/22) 4 yr ago  (12/25/19) 4 yr ago  (12/23/19)   Uric Acid 2.4 - 5.7 mg/dL 3.0 4.3 2.7   Resulting Agency  BRLB BRLB BRLB                       EMG 2/24/23:   IMPRESSION  1. ABNORMAL study  2. 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  There is an acute on chronic radiculopathy of the LEFT C6 nerve root and a subacute on chronic of the RIGHT C4 and C5 nerve roots and a CHRONIC radiculopathy of bilateral C4-T1 nerve roots    Diagnostic Results:  EXAMINATION:  XR HAND COMPLETE 3 VIEWS BILATERAL     CLINICAL HISTORY:  . Pain in right hand     TECHNIQUE:  PA, lateral, and oblique views of both hands were performed.     COMPARISON:  08/06/2020     FINDINGS:  No acute osseous abnormality with similar OA findings present most pronounced within the left greater than right 1st CMC joints.  Soft tissues unremarkable.     Impression:     Similar appearance of the hands/wrists        Electronically signed by: Davy Gutierrez MD  Date:                                             09/15/2023  Time:                                           09:36     Imaging - I independently viewed the patient's imaging as well as the radiology report.  Xrays of the patient's bilateral hands demonstrate bilateral 1st cmc arthritis, no evidence of any acute fractures or dislocations.      ASSESSMENT/PLAN:        ICD-10-CM ICD-9-CM   1. Arthritis of carpometacarpal (CMC) joint of both thumbs  M18.0 716.94   2. Carpal tunnel syndrome on both sides  G56.03 354.0       Orders Placed This Encounter    ibuprofen (ADVIL,MOTRIN) 800 MG tablet       No orders of the defined types were placed in this encounter.    Plan:     - xrays from 9/15/23 reviewed, show bilateral thumb cmc arthritis  - case discussed with Dr. Navarro, who evaluated the patient face to face today and agrees with plan  - bilateral carpal tunnel injections, bilateral thumb cmc injections today. Patient must wait at least 3 months between injections.  - bilateral thumb spica braces provided today. Wear as needed  - discussed that these could be possible gout/pseudogout flare-ups. Last uric acid was 3.0 1/6/22. Recommend f/u with PCP or Rheumatology for possible medication management  - short course of ibuprofen 800mg sent to pharmacy for pain / gout symptoms  - f/u in 3 weeks / sooner if needed    Should the patient's symptoms worsen, persist, or fail to improve they should return for reevaluation and I would be happy to see them back anytime.        Jennifer Atkins PA-C   [facility]     Please be aware that this note has been generated with the assistance of MModal voice-to-text.  Please excuse any spelling or grammatical errors.

## 2024-01-31 ENCOUNTER — TELEPHONE (OUTPATIENT)
Dept: ORTHOPEDICS | Facility: CLINIC | Age: 79
End: 2024-01-31
Payer: MEDICARE

## 2024-01-31 DIAGNOSIS — Z78.0 MENOPAUSE: ICD-10-CM

## 2024-01-31 NOTE — TELEPHONE ENCOUNTER
Spoke to the patient an let her know that Jennifer Atkins PA-C said that she did not need to send her the medicine for gout since her lab results did not show that she had gout an let her know to take the ibuprofen for now an that if she has any more question about her lab result to refer to her PCP the patient verbalized all understandings     ----- Message from Gillian Verdin sent at 1/31/2024 10:20 AM CST -----  Contact: [phone]  Patient is requesting a call in regards to her prescription that was to be sent over to the pharmacy. Pt stated that pharmacy did not receive one of the prescriptions. Please call pt back at [phone]. Thanks KB

## 2024-02-01 ENCOUNTER — TELEPHONE (OUTPATIENT)
Dept: CARDIOLOGY | Facility: CLINIC | Age: 79
End: 2024-02-01
Payer: MEDICARE

## 2024-02-01 ENCOUNTER — TELEPHONE (OUTPATIENT)
Dept: INTERNAL MEDICINE | Facility: CLINIC | Age: 79
End: 2024-02-01
Payer: MEDICARE

## 2024-02-01 DIAGNOSIS — J30.9 CHRONIC ALLERGIC RHINITIS: ICD-10-CM

## 2024-02-01 RX ORDER — MONTELUKAST SODIUM 10 MG/1
10 TABLET ORAL DAILY
Qty: 90 TABLET | Refills: 3 | Status: SHIPPED | OUTPATIENT
Start: 2024-02-01

## 2024-02-01 NOTE — TELEPHONE ENCOUNTER
Spoke with patient and she stated that she needs a refill of her amlodipine and her montelukast. Informed her that in her chart, I see a note from Dr. Dye regarding the amlodipine being stopped when she was in the hospital. This note was from her visit with him in October 2023. She stated that she has been taking it every day. Informed her that I would ask Dr. Dye's staff to reach out to her regarding this refill request.

## 2024-02-01 NOTE — TELEPHONE ENCOUNTER
Called and LVM for pt to return call to the office regarding her BP medications due to note from Willie Masters, [facility].      ------Spoke with patient and she stated that she needs a refill of her amlodipine and her montelukast. Informed her that in her chart, I see a note from Dr. Dye regarding the amlodipine being stopped when she was in the hospital. This note was from her visit with him in October 2023. She stated that she has been taking it every day. Informed her that I would ask Dr. Dye's staff to reach out to her regarding this refill request.---------

## 2024-02-01 NOTE — TELEPHONE ENCOUNTER
----- Message from Lesa Yates sent at 2/1/2024 12:40 PM CST -----  Regarding: refill  Can the clinic reply in [facility]:       Please refill the medication(s) listed below. Please call the patient when the prescription(s) is ready for  at this phone number   IZABELLA SHEA [[id number]] [phone] (home)         Medication #1 montelukast (SINGULAIR) 10 mg tablet    Medication #2 amlodipine desylate 10 mg      Preferred Pharmacy:   [address] [address]  Phone: [phone] Fax: [phone]

## 2024-02-01 NOTE — PRE-PROCEDURE INSTRUCTIONS
Spoke with patients brother regarding procedure scheduled on 2.7     Arrival time 1045     Has patient been sick with fever or on antibiotics within the last 7 days? No     Does the patient have any open wounds, sores or rashes? No     Does the patient have any recent fractures? no     Has patient received a vaccination within the last 7 days? No     Received the COVID vaccination? yes     Has the patient stopped all medications as directed? hold plavix 5 days prior to procedure. Cardiac clearance obtained from dr bui on 11.28     Does patient have a pacemaker, defibrillator, or implantable stimulator? No     Does the patient have a ride to and from procedure and someone reliable to remain with patient?  brother     Is the patient diabetic? yes     Does the patient have sleep apnea? Or use O2 at home? No and no     Is the patient receiving sedation? yes     Is the patient instructed to remain NPO beginning at midnight the night before their procedure? yes     Procedure location confirmed with patient? Yes     Covid- Denies signs/symptoms. Instructed to notify PAT/MD if any changes.

## 2024-02-07 ENCOUNTER — HOSPITAL ENCOUNTER (OUTPATIENT)
Facility: HOSPITAL | Age: 79
Discharge: HOME | End: 2024-02-07
Attending: ANESTHESIOLOGY | Admitting: ANESTHESIOLOGY
Payer: MEDICARE

## 2024-02-07 VITALS
HEART RATE: 87 BPM | BODY MASS INDEX: 19.2 KG/M2 | RESPIRATION RATE: 16 BRPM | HEIGHT: 64 IN | SYSTOLIC BLOOD PRESSURE: 195 MMHG | DIASTOLIC BLOOD PRESSURE: 87 MMHG | OXYGEN SATURATION: 100 % | TEMPERATURE: 98 F | WEIGHT: 112.44 LBS

## 2024-02-07 DIAGNOSIS — M54.12 CERVICAL RADICULOPATHY: Primary | ICD-10-CM

## 2024-02-07 LAB — POCT GLUCOSE: 88 MG/DL (ref 70–110)

## 2024-02-07 PROCEDURE — 25500020 PHARM REV CODE 255: Performed by: ANESTHESIOLOGY

## 2024-02-07 PROCEDURE — 62321 NJX INTERLAMINAR CRV/THRC: CPT | Mod: ,,, | Performed by: ANESTHESIOLOGY

## 2024-02-07 PROCEDURE — 25000003 PHARM REV CODE 250: Performed by: ANESTHESIOLOGY

## 2024-02-07 PROCEDURE — 82962 GLUCOSE BLOOD TEST: CPT | Performed by: ANESTHESIOLOGY

## 2024-02-07 PROCEDURE — 62321 NJX INTERLAMINAR CRV/THRC: CPT | Performed by: ANESTHESIOLOGY

## 2024-02-07 PROCEDURE — 63600175 PHARM REV CODE 636 W HCPCS: Performed by: ANESTHESIOLOGY

## 2024-02-07 RX ORDER — DEXAMETHASONE SODIUM PHOSPHATE 10 MG/ML
INJECTION INTRAMUSCULAR; INTRAVENOUS
Status: DISCONTINUED | OUTPATIENT
Start: 2024-02-07 | End: 2024-02-07 | Stop reason: HOSPADM

## 2024-02-07 RX ORDER — FENTANYL CITRATE 50 UG/ML
INJECTION, SOLUTION INTRAMUSCULAR; INTRAVENOUS
Status: DISCONTINUED | OUTPATIENT
Start: 2024-02-07 | End: 2024-02-07 | Stop reason: HOSPADM

## 2024-02-07 RX ORDER — INDOMETHACIN 25 MG/1
CAPSULE ORAL
Status: DISCONTINUED | OUTPATIENT
Start: 2024-02-07 | End: 2024-02-07 | Stop reason: HOSPADM

## 2024-02-07 RX ORDER — BUPIVACAINE HYDROCHLORIDE 2.5 MG/ML
INJECTION, SOLUTION EPIDURAL; INFILTRATION; INTRACAUDAL
Status: DISCONTINUED | OUTPATIENT
Start: 2024-02-07 | End: 2024-02-07 | Stop reason: HOSPADM

## 2024-02-07 NOTE — DISCHARGE INSTRUCTIONS

## 2024-02-07 NOTE — DISCHARGE SUMMARY
Discharge Note  Short Stay      SUMMARY     Admit Date: 2/7/2024    Attending Physician: Francisco Oshea MD        Discharge Physician: Francisco Ohsea MD        Discharge Date: 2/7/2024 11:28 AM    Procedure(s) (LRB):  Cervical IL XANDER, Level C6/7, RN IV sedation (N/A)    Final Diagnosis: Cervical spondylosis [M47.812]  DDD (degenerative disc disease), cervical [M50.30]  Cervical radiculopathy [M54.12]    Disposition: Home or self care    Patient Instructions:   Current Discharge Medication List        CONTINUE these medications which have NOT CHANGED    Details   atorvastatin (LIPITOR) 40 MG tablet TAKE 1 TABLET BY MOUTH ONCE DAILY  Qty: 90 tablet, Refills: 1    Associated Diagnoses: Coronary artery disease of native artery of native heart with stable angina pectoris      !! carvediloL (COREG) 25 MG tablet Take 25 mg by mouth 2 (two) times daily.      !! carvediloL (COREG) 6.25 MG tablet Take 1 tablet (6.25 mg total) by mouth 2 (two) times daily with meals.  Qty: 180 tablet, Refills: 1    Comments: Please bring meds to patient's bedside prior to discharge      cholestyramine (QUESTRAN) 4 gram packet TAKE 1 PACKET BY MOUTH TWICE DAILY AS DIRECTED  Qty: 60 packet, Refills: 11    Comments: This prescription was filled on 10/27/2023. Any refills authorized will be placed on file.  Associated Diagnoses: Infectious diarrhea      dicyclomine (BENTYL) 10 MG capsule Take 1 capsule (10 mg total) by mouth 2 (two) times daily.  Qty: 10 capsule, Refills: 0    Comments: Please bring meds to patient's bedside prior to discharge      diltiaZEM (CARDIZEM CD) 120 MG Cp24 Take 2 capsules (240 mg total) by mouth once daily.  Qty: 180 capsule, Refills: 1    Comments: This prescription was filled on 7/19/2023. Any refills authorized will be placed on file.  Associated Diagnoses: Primary hypertension      EScitalopram oxalate (LEXAPRO) 20 MG tablet TAKE 1 TABLET BY MOUTH ONCE DAILY  Qty: 90 tablet, Refills: 2    Comments: This prescription  was filled on 11/28/2023. Any refills authorized will be placed on file.  Associated Diagnoses: Moderate recurrent major depression      gabapentin (NEURONTIN) 300 MG capsule Take 1 capsule (300 mg total) by mouth 3 (three) times daily.  Qty: 90 capsule, Refills: 1      hydrOXYzine HCL (ATARAX) 25 MG tablet Take 1 tablet (25 mg total) by mouth every evening.  Qty: 30 tablet, Refills: 2    Comments: This prescription was filled on 1/8/2024. Any refills authorized will be placed on file.  Associated Diagnoses: Pruritus      isosorbide mononitrate (IMDUR) 60 MG 24 hr tablet Take 1 tablet (60 mg total) by mouth every evening.  Qty: 90 tablet, Refills: 1      levETIRAcetam (KEPPRA) 500 MG Tab Take 1 tablet (500 mg total) by mouth 2 (two) times daily.  Qty: 180 tablet, Refills: 3      linezolid (ZYVOX) 600 mg Tab Take 1 tablet (600 mg total) by mouth every 12 (twelve) hours.  Qty: 14 tablet, Refills: 0    Associated Diagnoses: Cellulitis of right thumb      metoprolol tartrate (LOPRESSOR) 100 MG tablet Take 100 mg by mouth 2 (two) times daily.      montelukast (SINGULAIR) 10 mg tablet Take 1 tablet (10 mg total) by mouth once daily.  Qty: 90 tablet, Refills: 3    Associated Diagnoses: Chronic allergic rhinitis      omeprazole (PRILOSEC) 40 MG capsule Take 40 mg by mouth every morning.      pantoprazole (PROTONIX) 40 MG tablet Take 40 mg by mouth 2 (two) times daily.      ranolazine (RANEXA) 1,000 mg Tb12 TAKE 1 TABLET BY MOUTH TWICE DAILY  Qty: 180 tablet, Refills: 3    Associated Diagnoses: Coronary artery disease of native artery of native heart with stable angina pectoris      roflumilast (DALIRESP) 500 mcg Tab TAKE ONE TABLET BY MOUTH DAILY  Qty: 90 tablet, Refills: 3    Comments: This prescription was filled on 12/27/2023. Any refills authorized will be placed on file.      tiZANidine (ZANAFLEX) 4 MG tablet Take 4 mg by mouth every 8 (eight) hours.      albuterol (PROVENTIL) 2.5 mg /3 mL (0.083 %) nebulizer solution  EMPTY 1 VIAL INTO NEBULIZER EVERY 4 HOURS AS NEEDED FRO RESCUE  Qty: 180 mL, Refills: 11    Associated Diagnoses: Mucopurulent chronic bronchitis      albuterol (PROVENTIL/VENTOLIN HFA) 90 mcg/actuation inhaler INHALE TWO PUFFS INTO THE LUNGS EVERY 4 HOURS AS NEEDED  Qty: 18 g, Refills: 11    Comments: This prescription was filled on 11/2/2022. Any refills authorized will be placed on file.  Associated Diagnoses: Mucopurulent chronic bronchitis      APTIOM 400 mg Tab tablet Take 400 mg by mouth.      cholecalciferol, vitamin D3, 1,250 mcg (50,000 unit) capsule Take 1 capsule (50,000 Units total) by mouth every 7 days.  Qty: 12 capsule, Refills: 0    Associated Diagnoses: Vitamin D insufficiency      clopidogreL (PLAVIX) 75 mg tablet Take 1 tablet (75 mg total) by mouth once daily.  Qty: 90 tablet, Refills: 3    Associated Diagnoses: Coronary artery disease of native artery of native heart with stable angina pectoris      denosumab (PROLIA) 60 mg/mL Syrg every 6 (six) months.      dorzolamide-timolol 2-0.5% (COSOPT) 22.3-6.8 mg/mL ophthalmic solution Place 1 drop into both eyes every 12 (twelve) hours.  Qty: 10 mL, Refills: 12    Associated Diagnoses: Primary open angle glaucoma (POAG) of both eyes, moderate stage      fluocinonide (LIDEX) 0.05 % external solution Apply topically 2 (two) times daily. Use on itchy spots on scalp and ear  Qty: 60 mL, Refills: 2    Associated Diagnoses: Pruritus      fluticasone propionate (FLONASE) 50 mcg/actuation nasal spray USE 2 SPRAYS INTO EACH NOSTRIL ONCE A DAY AT 6AM AS DIRECTED  Qty: 16 g, Refills: 11    Comments: This prescription was filled on 11/28/2023. Any refills authorized will be placed on file.  Associated Diagnoses: Non-seasonal allergic rhinitis due to other allergic trigger      fluticasone-umeclidin-vilanter (TRELEGY ELLIPTA) 200-62.5-25 mcg inhaler INHALE 1 PUFF INTO THE LUNGS DAILY  Qty: 60 each, Refills: 11    Comments: This prescription was filled on  11/28/2023. Any refills authorized will be placed on file.  Associated Diagnoses: Moderate persistent asthma without complication      furosemide (LASIX) 20 MG tablet Take 20 mg by mouth daily as needed.      HYDROcodone-acetaminophen (NORCO) 5-325 mg per tablet Take 1 tablet by mouth every 6 (six) hours as needed for Pain.      ibuprofen (ADVIL,MOTRIN) 800 MG tablet Take 1 tablet (800 mg total) by mouth 3 (three) times daily.  Qty: 60 tablet, Refills: 0    Associated Diagnoses: Arthritis of carpometacarpal (CMC) joint of both thumbs; Carpal tunnel syndrome on both sides      ipratropium (ATROVENT) 42 mcg (0.06 %) nasal spray USE 2 SPRAYS INTO EACH NOSTRIL FOUR TIMES DAILY  Qty: 15 mL, Refills: 11    Comments: This prescription was filled on 11/28/2023. Any refills authorized will be placed on file.  Associated Diagnoses: Non-seasonal allergic rhinitis due to other allergic trigger      ketoconazole (NIZORAL) 2 % cream Apply topically 2 (two) times daily. For dry flaky patches of ear.  Qty: 30 g, Refills: 1    Associated Diagnoses: Seborrheic dermatitis      latanoprost 0.005 % ophthalmic solution Place 1 drop into both eyes every evening.  Qty: 2.5 mL, Refills: 12    Associated Diagnoses: Primary open angle glaucoma (POAG) of both eyes, moderate stage      magnesium oxide (MAG-OX) 400 mg (241.3 mg magnesium) tablet Take 1 tablet (400 mg total) by mouth once daily.  Qty: 90 tablet, Refills: 1      MYRBETRIQ 50 mg Tb24 TAKE 1 TABLET BY MOUTH ONCE DAILY  Qty: 30 tablet, Refills: 0      nitroGLYCERIN (NITROSTAT) 0.4 MG SL tablet DISSOLVE 1 TABLET UNDER TONGUE EVERY 5 MINUTES FOR CHEST PAIN (MAY TAKE UP TO 3 DOSES THEN SEEK MEDICAL ATTENTION)  Qty: 25 tablet, Refills: 0      ondansetron (ZOFRAN-ODT) 4 MG TbDL Take 1 tablet (4 mg total) by mouth every 6 (six) hours as needed.  Qty: 30 tablet, Refills: 0      oteseconazole 150 mg Cap Take 150 mg by mouth every 72 hours. X 3 doses  Qty: 3 capsule, Refills: 1    Associated  Diagnoses: Antibiotic-induced yeast infection      potassium chloride SA (K-DUR,KLOR-CON) 20 MEQ tablet Take 1 tablet (20 mEq total) by mouth once daily. Take extra dose with Lasix - fluid pill  Qty: 90 tablet, Refills: 1      predniSONE (DELTASONE) 10 MG tablet 2 tabs x 10 days. 1 daily x 20 days.  Qty: 40 tablet, Refills: 0    Associated Diagnoses: COPD with acute exacerbation      promethazine-dextromethorphan (PROMETHAZINE-DM) 6.25-15 mg/5 mL Syrp Take 5 mLs by mouth every 6 (six) hours as needed (cough).  Qty: 240 mL, Refills: 0    Associated Diagnoses: Acute cough      sodium chloride 3% 3 % nebulizer solution Take 4 mLs by nebulization as needed for Cough (chest congestion).  Qty: 360 mL, Refills: 11    Associated Diagnoses: Mucopurulent chronic bronchitis; Chest congestion      triamcinolone acetonide 0.025% (KENALOG) 0.025 % cream Apply topically 2 (two) times daily. PRN rash and itching of ear. Mild steroid cream.  Qty: 30 g, Refills: 0    Associated Diagnoses: Seborrheic dermatitis       !! - Potential duplicate medications found. Please discuss with provider.              Discharge Diagnosis: Cervical spondylosis [M47.812]  DDD (degenerative disc disease), cervical [M50.30]  Cervical radiculopathy [M54.12]  Condition on Discharge: Stable with no complications to procedure   Diet on Discharge: Same as before.  Activity: as per instruction sheet.  Discharge to: Home with a responsible adult.  Follow up: 2-4 weeks       Please call the office at [phone] if you experience any weakness or loss of sensation, fever > 101.5, pain uncontrolled with oral medications, persistent nausea/vomiting/or diarrhea, redness or drainage from the incisions, or any other worrisome concerns. If physician on call was not reached or could not communicate with our office for any reason please go to the nearest emergency department

## 2024-02-07 NOTE — OP NOTE
"Shireen Felix  78 y.o. female      Vitals:    02/07/24 1126   BP: (!) 201/88   Pulse: 83   Resp: 11   Temp:        Procedure Date: 02/07/2024      INFORMED CONSENT: The procedure, risks, benefits and options were discussed with patient. There are no contraindications to the procedure. The patient expressed understanding and agreed to proceed. The personnel performing the procedure was discussed. I verify that I personally obtained consent prior to the start of the procedure and the signed consent can be found on the patient's chart.         Anesthesia: Topical     Pre Procedure diagnosis: Cervical spondylosis [M47.812]  DDD (degenerative disc disease), cervical [M50.30]  Cervical radiculopathy [M54.12]     Post-Procedure diagnosis: SAME      Sedation:Conscious sedation provided by M.D    The patient was monitored with continuous pulse oximetry, EKG, and intermittent blood pressure monitors.  The patient was hemodynamically stable throughout the entire process was responsive to voice, and breathing spontaneously.  Supplemental O2 was provided at 2L/min via nasal cannula.  Patient was comfortable for the duration of the procedure. (See nurse documentation and case log for sedation time)    There was a total of 0mg IV Midazolam and 100mcg Fentanyl titrated for the procedure       PROCEDURE:  CERVICAL EPIDURAL STEROID INJECTION         DESCRIPTION OF PROCEDURE: The patient was brought to the procedure room. After performing time out.  IV access was obtained prior to the procedure. The patient was positioned prone on the fluoroscopy table. Continuous hemodynamic monitoring was initiated including blood pressure, EKG, and pulse oximetry. The area of the cervical spine was prepped with chlorhexidine and draped in a sterile fashion. Skin anesthesia was achieved using 3 mL of Lidocaine 1% over the respective injection site. The C6/7 interspace was visualized under fluoroscopic imaging. An 18 gauge 3 1/2" Tuohy needle " was slowly inserted from a R paramedian approach and advanced using loss of resistance to saline with AP, oblique and lateral fluoroscopic imaging for needle guidance. Negative aspiration for blood or CSF was confirmed. Epidural contrast spread was confirmed using 2mL of Omnipaque 300 contrast. Spread of the contrast in the cervical epidural space was noted . 6 mL of bupivacaine 0.25% and 10 mg decadron was injected. The needle was removed and bleeding was nil. A sterile dressing was applied. No specimens collected. patient was taken back to the recovery room for further observation.      Blood Loss: Nill  Specimen: None

## 2024-02-07 NOTE — H&P
HPI  Patient presenting for Procedure(s) (LRB):  Cervical IL XANDER, Level C6/7, RN IV sedation (N/A)     Patient on Anti-coagulation No    No health changes since previous encounter    Past Medical History:   Diagnosis Date    Abnormal ECG 8/5/2019    Aneurysm     Anticoagulant long-term use     Arthritis     CAD in native artery 8/5/2019    COPD (chronic obstructive pulmonary disease)     Diabetes mellitus     Fall 10/10/2019    Formatting of this note might be different from the original. Found sitting on floor next to bed last night Mild confusion today Does not recall falling or how she ended up on floor UA today Labs yesterday unremarkable    Glaucoma     Hypertension     Seizures     Shingles 05/27/2017    Stroke      Past Surgical History:   Procedure Laterality Date    BRAIN SURGERY      CARDIAC CATHETERIZATION      COLONOSCOPY N/A 09/21/2023    Procedure: COLONOSCOPY;  Surgeon: Joanna Leal MD;  Location: [facility] ENDO;  Service: Endoscopy;  Laterality: N/A;    CORONARY ANGIOPLASTY      EPIDURAL STEROID INJECTION INTO LUMBAR SPINE Bilateral 11/12/2019    Procedure: TF XANDER L4/5;  Surgeon: Desean Dias MD;  Location: [facility] PAIN MGT;  Service: Pain Management;  Laterality: Bilateral;    HYSTERECTOMY      INJECTION OF ANESTHETIC AGENT AROUND MEDIAL BRANCH NERVES INNERVATING LUMBAR FACET JOINT Bilateral 01/31/2020    Procedure: Bilateral L3-5 MBB;  Surgeon: Desean Dias MD;  Location: [facility] PAIN MGT;  Service: Pain Management;  Laterality: Bilateral;    INJECTION OF ANESTHETIC AGENT INTO SACROILIAC JOINT Right 11/16/2021    Procedure: Right BLOCK, SACROILIAC JOINT Right GTB with RN IV sedation;  Surgeon: Yao Fulton MD;  Location: [facility] PAIN MGT;  Service: Pain Management;  Laterality: Right;    INJECTION OF ANESTHETIC AGENT INTO SACROILIAC JOINT Bilateral 07/28/2023    Procedure: Bilateral GT bursa + bilateral SIJ injection;  Surgeon: Francisco Oshea MD;  Location: [facility] PAIN MGT;  Service: Pain  Management;  Laterality: Bilateral;    INJECTION OF JOINT Bilateral 07/09/2020    Procedure: Bilateral shoulder GH Joint injection with local;  Surgeon: Desean Dias MD;  Location: [facility] PAIN [facility]GT;  Service: Pain Management;  Laterality: Bilateral;    INJECTION OF JOINT Bilateral 07/28/2023    Procedure: Bilateral GT bursa + bilateral SIJ injection NEEDS ADDITIONAL SEDATION AND MORE TIME BEFORE INJECTION RN IV Sedation;  Surgeon: Francisco Oshea MD;  Location: [facility] PAIN [facility]GT;  Service: Pain Management;  Laterality: Bilateral;    INJECTION OF JOINT N/A 10/25/2023    Procedure: Sacrococcygeal joint injection;  Surgeon: Francisco Oshea MD;  Location: [facility] PAIN [facility]GT;  Service: Pain Management;  Laterality: N/A;    OOPHORECTOMY      SELECTIVE INJECTION OF ANESTHETIC AGENT AROUND LUMBAR SPINAL NERVE ROOT BY TRANSFORAMINAL APPROACH Bilateral 04/26/2023    Procedure: Bilateral L4/5 TF XANDER RN IV Sedation;  Surgeon: Francisco Oshea MD;  [facility]: [facility];  Service: Pain Management;  Laterality: Bilateral;    TRANSFORAMINAL EPIDURAL INJECTION OF STEROID Bilateral 07/23/2019    Procedure: Bilateral L3/4 Transforaminal Epidural Steroid Injection;  Surgeon: Desean Dias MD;  Location: [facility];  Service: Pain Management;  Laterality: Bilateral;    TRANSFORAMINAL EPIDURAL INJECTION OF STEROID Bilateral 03/10/2020    Procedure: Right T12/L1 TF XANDER with local;  Surgeon: Desean Dias MD;  [facility]: [facility];  Service: Pain Management;  Laterality: Bilateral;    TRANSFORAMINAL EPIDURAL INJECTION OF STEROID Right 06/19/2020    Procedure: Right T12/L1 TFESI Covid day of procedure;  Surgeon: Desean Dias MD;  Location: [facility]IN [facility];  Service: Pain Management;  Laterality: Right;     Review of patient's allergies indicates:   Allergen Reactions    Penicillins Anaphylaxis, Hives, Shortness Of Breath and Swelling    Adhesive Rash    Doxycycline Nausea Only    Oxycodone Other (See Comments)     Fatigue      Sulfa  (sulfonamide antibiotics) Itching        No current facility-administered medications on file prior to encounter.     Current Outpatient Medications on File Prior to Encounter   Medication Sig Dispense Refill    atorvastatin (LIPITOR) 40 MG tablet TAKE 1 TABLET BY MOUTH ONCE DAILY 90 tablet 1    carvediloL (COREG) 6.25 MG tablet Take 1 tablet (6.25 mg total) by mouth 2 (two) times daily with meals. 180 tablet 1    cholestyramine (QUESTRAN) 4 gram packet TAKE 1 PACKET BY MOUTH TWICE DAILY AS DIRECTED 60 packet 11    dicyclomine (BENTYL) 10 MG capsule Take 1 capsule (10 mg total) by mouth 2 (two) times daily. 10 capsule 0    diltiaZEM (CARDIZEM CD) 120 MG Cp24 Take 2 capsules (240 mg total) by mouth once daily. 180 capsule 1    gabapentin (NEURONTIN) 300 MG capsule Take 1 capsule (300 mg total) by mouth 3 (three) times daily. 90 capsule 1    isosorbide mononitrate (IMDUR) 60 MG 24 hr tablet Take 1 tablet (60 mg total) by mouth every evening. 90 tablet 1    levETIRAcetam (KEPPRA) 500 MG Tab Take 1 tablet (500 mg total) by mouth 2 (two) times daily. 180 tablet 3    linezolid (ZYVOX) 600 mg Tab Take 1 tablet (600 mg total) by mouth every 12 (twelve) hours. 14 tablet 0    pantoprazole (PROTONIX) 40 MG tablet Take 40 mg by mouth 2 (two) times daily.      ranolazine (RANEXA) 1,000 mg Tb12 TAKE 1 TABLET BY MOUTH TWICE DAILY 180 tablet 3    albuterol (PROVENTIL/VENTOLIN HFA) 90 mcg/actuation inhaler INHALE TWO PUFFS INTO THE LUNGS EVERY 4 HOURS AS NEEDED 18 g 11    APTIOM 400 mg Tab tablet Take 400 mg by mouth.      cholecalciferol, vitamin D3, 1,250 mcg (50,000 unit) capsule Take 1 capsule (50,000 Units total) by mouth every 7 days. 12 capsule 0    clopidogreL (PLAVIX) 75 mg tablet Take 1 tablet (75 mg total) by mouth once daily. 90 tablet 3    denosumab (PROLIA) 60 mg/mL Syrg every 6 (six) months.      dorzolamide-timolol 2-0.5% (COSOPT) 22.3-6.8 mg/mL ophthalmic solution Place 1 drop into both eyes every 12 (twelve) hours.  "10 mL 12    fluocinonide (LIDEX) 0.05 % external solution Apply topically 2 (two) times daily. Use on itchy spots on scalp and ear 60 mL 2    furosemide (LASIX) 20 MG tablet Take 20 mg by mouth daily as needed.      HYDROcodone-acetaminophen (NORCO) 5-325 mg per tablet Take 1 tablet by mouth every 6 (six) hours as needed for Pain.      ketoconazole (NIZORAL) 2 % cream Apply topically 2 (two) times daily. For dry flaky patches of ear. 30 g 1    latanoprost 0.005 % ophthalmic solution Place 1 drop into both eyes every evening. 2.5 mL 12    magnesium oxide (MAG-OX) 400 mg (241.3 mg magnesium) tablet Take 1 tablet (400 mg total) by mouth once daily. 90 tablet 1    MYRBETRIQ 50 mg Tb24 TAKE 1 TABLET BY MOUTH ONCE DAILY 30 tablet 0    nitroGLYCERIN (NITROSTAT) 0.4 MG SL tablet DISSOLVE 1 TABLET UNDER TONGUE EVERY 5 MINUTES FOR CHEST PAIN (MAY TAKE UP TO 3 DOSES THEN SEEK MEDICAL ATTENTION) 25 tablet 0    ondansetron (ZOFRAN-ODT) 4 MG TbDL Take 1 tablet (4 mg total) by mouth every 6 (six) hours as needed. 30 tablet 0    potassium chloride SA (K-DUR,KLOR-CON) 20 MEQ tablet Take 1 tablet (20 mEq total) by mouth once daily. Take extra dose with Lasix - fluid pill 90 tablet 1    triamcinolone acetonide 0.025% (KENALOG) 0.025 % cream Apply topically 2 (two) times daily. PRN rash and itching of ear. Mild steroid cream. 30 g 0        PMHx, PSHx, Allergies, Medications reviewed in epic    ROS negative except pain complaints in HPI    OBJECTIVE:    BP (!) 189/85 (BP Location: Right arm, Patient Position: Sitting)   Pulse 78   Temp 97.9 °F (36.6 °C)   Resp 17   Ht 5' 4" (1.626 m)   Wt 51 kg (112 lb 7 oz)   LMP  (LMP Unknown)   SpO2 100%   Breastfeeding No   BMI 19.30 kg/m²     PHYSICAL EXAMINATION:    GENERAL: Well appearing, in no acute distress, alert and oriented x3.  PSYCH:  Mood and affect appropriate.  SKIN: Skin color, texture, turgor normal, no rashes or lesions which will impact the procedure.  CV: RRR with " palpation of the radial artery.  PULM: No evidence of respiratory difficulty, symmetric chest rise. Clear to auscultation.  NEURO: Cranial nerves grossly intact.    Plan:    Proceed with procedure as planned Procedure(s) (LRB):  Cervical IL XANDER, Level C6/7, RN IV sedation (N/A)    Francisco Oshea MD  02/07/2024

## 2024-02-26 ENCOUNTER — TELEPHONE (OUTPATIENT)
Dept: PAIN MEDICINE | Facility: CLINIC | Age: 79
End: 2024-02-26
Payer: MEDICARE

## 2024-02-26 NOTE — TELEPHONE ENCOUNTER
Called patient to confirm appointment . Patent Answered and did confirmed. ( Had to r/s because pt was sick)        As certified below, I, or a nurse practitioner or physician assistant working with me, had a face-to-face encounter that meets the physician face-to-face encounter requirements.

## 2024-02-26 NOTE — TELEPHONE ENCOUNTER
----- Message from Suzan Cruz sent at 2/26/2024  3:22 PM CST -----  Contact: Jetty  Brother Rm called stating sister is sick and couldn't do virtual appointment this morning. Please callback [phone]

## 2024-02-27 ENCOUNTER — TELEPHONE (OUTPATIENT)
Dept: PAIN MEDICINE | Facility: CLINIC | Age: 79
End: 2024-02-27

## 2024-02-27 ENCOUNTER — OFFICE VISIT (OUTPATIENT)
Dept: PAIN MEDICINE | Facility: CLINIC | Age: 79
End: 2024-02-27
Payer: MEDICARE

## 2024-02-27 DIAGNOSIS — M19.019 GLENOHUMERAL ARTHRITIS: ICD-10-CM

## 2024-02-27 DIAGNOSIS — M47.812 CERVICAL SPONDYLOSIS: ICD-10-CM

## 2024-02-27 DIAGNOSIS — M50.30 DDD (DEGENERATIVE DISC DISEASE), CERVICAL: ICD-10-CM

## 2024-02-27 DIAGNOSIS — M54.12 CERVICAL RADICULOPATHY: Primary | ICD-10-CM

## 2024-02-27 PROCEDURE — 99214 OFFICE O/P EST MOD 30 MIN: CPT | Mod: 95,,, | Performed by: ANESTHESIOLOGY

## 2024-02-27 PROCEDURE — 1159F MED LIST DOCD IN RCRD: CPT | Mod: CPTII,95,, | Performed by: ANESTHESIOLOGY

## 2024-02-27 PROCEDURE — 1160F RVW MEDS BY RX/DR IN RCRD: CPT | Mod: CPTII,95,, | Performed by: ANESTHESIOLOGY

## 2024-02-27 PROCEDURE — 3072F LOW RISK FOR RETINOPATHY: CPT | Mod: CPTII,95,, | Performed by: ANESTHESIOLOGY

## 2024-02-27 NOTE — PROGRESS NOTES
Established Patient Interventional Pain Clinic Visit    The patient location is: home  The chief complaint leading to consultation is: neck and arm pain  Visit type: Virtual visit with synchronous audio and video  Total time spent with patient: 11-15m  Each patient to whom he or she provides medical services by telemedicine is: (1) informed of the relationship between the physician and patient and the respective role of any other health care provider with respect to management of the patient; and (2) notified that he or she may decline to receive medical services by telemedicine and may withdraw from such care at any time.    Chief Pain Complaint:  Neck & arm pain      Interval history 02/27/2024  Patient presents status post C6-7 interlaminar epidural steroid injection with right paramedian approach 02/07/2024.  Patient reports approximately 70%-80% sustained relief in neck, upper extremity radicular symptoms following epidural steroid injection.  She reports significant improvement in radicular symptoms, numbness and tingling in bilateral arms and compromise in hand  strength and dexterity.  Today she reports residual pain particularly in the left shoulder territory.  Of note patient has not received glenohumeral joint injection since 2020 with Dr. Disa.  At that time she does report noticeable improvement exceeding 3 months in duration with her prior procedure.  Today she does report pain is exacerbated with overhead movements and shoulder internal/external rotation.  She is continued physician directed physical therapy exercises over the last 8 weeks from 12/27/2023 through 02/27/2024 with noticeable improvement in pain, range of motion and functionality and neck and arms and shoulders.    Interval History (2/27/2024):  Shireen Jeffreyrick Felix presents today for follow-up visit.  Patient was last seen on 11/8/2023. At that visit, the plan was to perform TPI on upper back and return to clinic 3-4 weeks for TPI  on lower back. Patient reports pain as 8/10 today.  Patient reports previous TPI did help but she continues to have pain on R side of upper back, bilateral arm pain and numbness/tingling in both hands. She also has pain involving R shoulder joint.     Interval History (11/08/2023):  Shireen Felix presents today for follow-up visit.  she underwent sacrococcygeal joint injection on 2/7/24.  The patient reports that she is/was better following the procedure.  she reports 80% pain relief.  The changes lasted so far.  The changes have continued through this visit. The patient now c/o pain related to myofascial pain in upper back. She also pain in lower back (lumbar paraspinal) but the upper back pain is most concerning at this time.  Patient reports pain as 8/10 today.  The patient also c/o itching in her scalp with hair loss over the past two months.    Interval history 10/11/2023  Patient presents status post bilateral sacroiliac joint and greater trochanteric bursa injection 07/28/2023.  Patient reports approximately 90% sustained relief overlying bilateral sacroiliac joint and GT bursa.  Today she presents following a mechanical fall.  Patient reports approximately 5 days prior going to the bathroom in the middle of the night, losing her balance and hitting the back of her head and lower back.  Today she reports pain which is constant which is rated an 8/10.  Patient reports pain along bilateral cervical paraspinous musculature which is exacerbated with cervical flexion/extension and lateral flexion.  She reports this pain has improved over the last few days.  Her primary concern is pain along her coccyx.  Patient reports tenderness to touch in pain with sitting as well as sitting or standing.  Patient is requesting x-rays for evaluation.  She also would like to restart physical therapy.  Of note she is going to [facility] in [place] and would like to restart dry needling, soft massage and assistance with  balance.    Interval History (6/27/2023):  Shireen Felix presents today for follow-up visit.  Patient was last seen on 5/16/2023. She reports some improvement in her pain with TPIs given at last visit. She reports continued variations in her BP throughout the day. She has followed up with Neurology who has made changes to her seizure medications. Seems to be tolerating med change well. Continues to monitor BP. Has followed up with PCP and other specialist to rule out other causes for BP changes (renal, intracranial, etc).  Patient reports pain as 7/10 today. No changes on CT compared to previous.  Interval History (6/14/2023):  Shireen Felix presents today for trigger point injections.  Patient was last seen on 06/09/2023. Patient reports pain as 8/10 today. She continues to report pain as primarily located in her lower lumbosacral region with radiation into her buttocks, right worse than left, and wrapping around her lateral hips. She reports additional falls since last visit. She reports worsening neck pain with radiation into her left arm down to her hand with cramping and burning.  She also reports worsening symptoms down both legs.  interfering with her ability to walk at times due to significant weakness. She reports repeated falls due to her legs giving out from underneath her unexpectedly.       Interval History (6/9/2023):  Shireen Felix presents today via telemed for follow-up visit.  Patient was last seen on 05/16/2023. Last injection bilateral L4/5 TFESI on 4/26/23 with 80% relief. Patient reports pain as 10/10 today. She reports pain that began over the past several weeks, became severe 2 days ago. She reports pain is primarily located in her lower lumbosacral region with radiation into her buttocks, right worse than left, and wrapping around her lateral hips. Pain is similar to that prior to SIJ and GT bursa injection, last SIJ injection 11/16/2021 with excellent relief until a few  weeks ago when pain returned. She reports in the past she has also received trigger point injections in her lower back that were very helpful.    Interval History (5/16/2023): Shireen Felix presents today for follow-up visit.  she underwent bilateral L4/5 TFESI on 4/26/23.  The patient reports that she is/was better following the procedure.  she reports 80% pain relief.  The changes lasted 4 weeks so far.  The changes have continued through this visit.  Patient reports pain as 7/10 today. She reports the procedure itself was very painful. Reports she did not feel sedated at all compared to previous procedures with other providers. She is not interested in pursuing additional procedures secondary to the painful procedure.  She reports worsening neck pain with radiation into her right arm down to her hand with cramping and burning.    Interval History (3/9/2023):  Shireen Felix presents today for follow-up visit for CT and EMG review.  Patient was last seen on 2/1/2023. At that visit, the plan was to order updated CT of her cervical and lumbar spine as well as EMG of her bilateral upper extremities. Patient reports pain as 7/10 today.  She reports worsening neck pain with radiation into her left arm down to her hand with cramping and burning.  She also reports worsening symptoms down both legs.  interfering with her ability to walk at times due to significant weakness. Has an appointment with Dr. Navarro on 03/21/2023. Patient fell hit head on concrete 2-11-23, and had a seizure shortly after.  She reports repeated falls due to her legs giving out from underneath her unexpectedly. Referred to Neurology.  She is also currently on antibiotics secondary to having 10 teeth pulled     Interval History (1/31/2023):  Shireen Felix presents today for follow-up visit.  Patient was last seen on 10/4/2022. At that visit, the plan was to schedule bilateral L3-5 MBB followed 2 weeks later by XANDER.  She canceled  these procedures and reports at this time she is not interested in scheduling.  She reports she has a  and goes to the gym 3-4 times per week which includes walking on a treadmill.   Patient reports pain as 7/10 today. She reports worsening neck pain with radiation into her left arm down to her hand with cramping and burning. This has been severe over the past week. Last cervical CT in 2019, she reports she has sustained multiple falls since then. She also reports worsening symptoms down her left leg, interfering with her ability to walk at times due to significant weakness.      Interval history 10/04/2022  Ms. Felix is a 76-year-old female with past medical history significant for cerebral aneurysm status post craniotomy and repair, cerebral vascular accident, left V1 distribution post herpetic neuralgia, depression, glaucoma, asthma/COPD, coronary artery disease, GERD, nicotine dependence, type 2 diabetes who presents to \[facility]"" care, previous Dr. Dias and Dr. Fulton patient.  Today patient reports pain in the lower back and legs.  Pain is constant today is rated 7/10.  Patient reports pain in a bandlike distribution in the lower back which radiates down the posterior aspects of bilateral lower extremities in L5-S1 distribution to mid thigh.  Pain is described as burning and aching in nature.  Pain is exacerbated when moving from sitting to standing and standing and ambulating just a few feet.  Patient does report associated weakness in the lower extremities associated with her pain.  Patient reports she is able to ambulate with assistive device, walker only a few feet before requiring rest.  Pain has been improved with prior procedures, including medial branch block and transforaminal epidural steroid injection.  Patient is interested in pursuing repeat injection.  Of note patient has trialed medicinal marijuana with Dr. Mckeon and reports palpitations side effect.  Patient has discontinued  tramadol, tizanidine and gabapentin.  Pain is improved with prednisone which she takes prescribed by her pulmonologist.    Interval history:  Dr. Fulton:  05/10/2021-05/06/2022  Shireen Felix is a 76 y.o. female  who is presenting with a chief complaint of Neck Pain. The patient began experiencing this problem insidiously, and the pain has been gradually worsening over the past 6 month(s). The pain is described as throbbing, cramping, aching and heavy and is located in the bilateral cervical spine. Pain is intermittent and lasts hours. The  pain is nonradiating. The patient rates her pain a 7 out of ten and interferes with activities of daily living a 7 out of ten. Pain is exacerbated by rotation of the cervical spine, and is improved by rest. Patient reports no prior trauma, no prior spinal surgery      This patient is a 74 y.o. female who presents today complaining of the above noted pain/s. The patient describes the pain as follows.  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  She reports approximately 2 years ago she had to sudden deaths in the family which caused very stressful time for her and resulted in shingles rash in the left V1 distribution however she reports having excruciating pain in the left V1 and V2 distributions.  She denies having difficulty with eating food and brushing her teeth on the left side of her face however the left lateral side of her nose gets her excruciating pain.  She has been tried on numerous medications in the past including gabapentin, Lyrica, Valtrex, Celebrex, ibuprofen which have all provided minimal benefit however steroids have been most helpful.  Today she rates her pain as an 4/10 and describes a constant burning sensation the left side of her face in addition to radiation into her bilateral lower extremities, her right shoulder,  her left upper extremity occasionally as a pins and needle sensation.  She does report having numbness and weakness in her left lower extremity.  She has been physical therapy which she completed in February of 2019 however this caused most of her low back and leg symptoms to worsen in addition to her post herpetic neuralgia to worsen.  She denies having bowel bladder difficulties.  Her symptoms are worse with activity such as walking in her somewhat improved with rest however she had finds it difficult to get into a comfortable position. She has been using topical lidocaine patches and applying to the left side of her face which does provide significant benefit.  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     Shireen Felix is a 76 y.o. female  who is presenting with a chief complaint of lumbar spine. The patient began experiencing this problem insidiously, and the pain has been gradually worsening over the past 2 year(s). The pain is described as throbbing, shooting, burning, aching and electrical and is located in the bilateral lumbar spine. Pain is intermittent and lasts hours. The pain radiates to bilateral lower extremities. The patient rates her pain a 7 out of ten and interferes with activities of daily living a 6 out of ten. Pain is exacerbated by flexion of the lumbar spine, and is improved by rest. Patient reports no prior trauma, no prior spinal surgery     Interval HPI 06/03/2020: Dr. Dias:  Ms. Felix returns to clinic for follow-up.  She underwent a T12-L1 transforaminal epidural steroid injection on March 10, 2020 and she reports that this has provided significant pain relief for her which radiated into her right leg prior to the injection.  She does report that his symptoms have returned and the injections worn off her pain is currently rated 7/10.  She was  scheduled to undergo bilateral lumbar radiofrequency ablation prior to corona virus however she would like to hold off on that at this time and pursue repeat injection in the low back.  Unfortunately she also reports that she had her wheelchair fall over a few weeks ago and this has resulted in bilateral shoulder pain.  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  She finds that rest does provide some pain relief on activity causes her symptoms to worsen.  She has been able to walk significantly more after her most recent injection in March reported she is able to walk several steps before having to sit rest however this has a vast improvement from prior to the injection.    Interval HPI:  02/12/2020; Dr. Dias  Ms. Felix returns to clinic for follow-up.  She underwent bilateral lumbar medial branch blocks on January 31, 2020 and reports 100% symptomatic pain relief for approximately 1 week and his symptoms slowly began to return.  She continues to have some right-sided lower thoracic posterior pain which was not completely dressed with the medial branch blocks.  Today she rates her pain as an 8/10 which is located primarily in the axial lumbar spine and the lower right posterior thoracic region.  She denies having numbness weakness in the legs but does continue to have a constant aching and throbbing sensation in the low back.    Interval HPI 01/23/2020: Dr. Dias: Ms. Felix returns to clinic for follow-up.  She reports that she underwent bilateral L4/5 transforaminal epidural steroid injections in November 2019 reports ongoing 80% symptomatic pain relief.  She has also been participating physical therapy which she has found to be very helpful however 4 days ago she started to do some leg raise is in her bed and she felt severe pain in her low back which has not subsided.  She denies having any radiation except  for around her flank into her anterior abdomen.  She feels like the pain sometimes comes straight through from her back to her abdomen.  Today she rates her pain as a 10/10 describes it as a constant aching and sharp pain. She has been using a heating pad which is minimally helpful and she has been holding off on physical therapy at this time. She denies having bowel bladder difficulty.     Interval History: Patient was seen on 8/4/19. At that time she underwent bilateral L3/4 TF XANDER.  The patient reports that she is/was better following the procedure.  she reports 75% pain relief.  She had a fall, then pain increased. She is currently living at [facility]. She is doing at home therapy there, which is helping. She is in wheelchair now but hopes to transition to walker soon.  After the fall, she was not able to get out of the bed.      Initial History of Present Illness:  Dr. Dias  This patient is a 74 y.o. female who presents today complaining of the above noted pain/s. The patient describes the pain as follows.  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  She reports approximately 2 years ago she had to sudden deaths in the family which caused very stressful time for her and resulted in shingles rash in the left V1 distribution however she reports having excruciating pain in the left V1 and V2 distributions.  She denies having difficulty with eating food and brushing her teeth on the left side of her face however the left lateral side of her nose gets her excruciating pain.  She has been tried on numerous medications in the past including gabapentin, Lyrica, Valtrex, Celebrex, ibuprofen which have all provided minimal benefit however steroids have been most helpful.  Today she rates her pain as an 8/10 and describes a constant burning sensation the left side of her face in  addition to radiation into her bilateral lower extremities, her right shoulder, her left upper extremity occasionally as a pins and needle sensation.  She does report having numbness and weakness in her left lower extremity.  She has been physical therapy which she completed in February of 2019 however this caused most of her low back and leg symptoms to worsen in addition to her post herpetic neuralgia to worsen.  She denies having bowel bladder difficulties.  Her symptoms are worse with activity such as walking in her somewhat improved with rest however she had finds it difficult to get into a comfortable position. She has been using topical lidocaine patches and applying to the left side of her face which does provide significant benefit.  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    - pertinent negatives: No fever, No chills, No weight loss, No bladder dysfunction, No bowel dysfunction, No saddle anesthesia  - pertinent positives: generalized nonspecific Lower Extremity weakness bilaterally    - medications, other therapies tried (physical therapy, injections):     >> NSAIDs, Tylenol, Tramadol, Norco, gabapentin, lyrica, flexeril and medrol dose pack    >> Has previously undergone Physical Therapy      Pain injections:    Dr. Oshea:  -02/07/2024: C6-7 interlaminar epidural steroid injection with right paramedian approach  -10/25/2023: Sacrococcygeal joint injection with 80% relief  -07/28/2023: Bilateral sacroiliac joint and greater trochanteric bursa injection  -04/26/2023: bilateral L4/5 TFESI with 80% relief      -11/16/2021: Right-sided SI joint and greater trochanteric bursa injection; Dr. Fulton  -07/09/2020: Bilateral glenohumeral joint injection; Dr. Dais  -06/19/2020: Right-sided T12/L1 transforaminal epidural steroid injection; Dr. Dias  -03/10/2020:  T12/L1  transforaminal epidural steroid injection; Dr. Dias  -01/31/2020: Bilateral L3-5 medial branch blocks; Dr. Dias  -11/12/2019: Bilateral L4/5 transforaminal epidural steroid injection; Dr. Dias      Imaging / Labs / Studies (reviewed on 2/27/2024):     10/11/23    X-Ray Cervical Spine 5 View W Flex Extxt    Narrative    FINDINGS:  There is no acute fracture or compression deformity. Bones are demineralized. No aggressive lytic or blastic lesion seen.  Mild cervical dextrocurvature noted.  There is mild straightening of the normal cervical lordosis as well as mild anterolisthesis of C4 on C5.  No evidence of instability with flexion/extension.  Remaining alignment is unremarkable.  There is multilevel loss of intervertebral disc height with associated uncovertebral hypertrophy and endplate ossified ptosis, most prominent at C5-C6 and C6-C7.  More mild degenerative disc changes at C3-C4 and C4-C5. Multilevel bilateral facet arthropathy also noted. No gross bony spinal canal stenosis.  Moderate to severe left neuroforaminal stenosis at the C5-C6 and C6-C7 levels.  Additional mild neuroforaminal stenosis at the mid to upper cervical levels.  Left carotid calcifications noted.  Visualized soft tissues are otherwise unremarkable.    Impression  Mild cervical thoracic curvature and straightening of the normal cervical lordosis.  Mild anterolisthesis of C4 on C5 without evidence of instability.  Osteopenia and multilevel degenerative changes as above.      Sacrum/Coccyx X-rays:  EXAM: XR SACRUM AND COCCYX   HISTORY: Pain   FINDINGS:   3 views were obtained of the sacrum/coccyx.   Bilateral hip replacement hardware.  Degenerative changes within the visualized lumbar spine.  No visualized fracture.   The bones are osteopenic.      Impression:   No acute findings are identified.      CT Cervical spine 06/23/2023  FINDINGS:  There is unchanged grade 1 anterolisthesis of C3 on C4 and C4 on C5.  There is no new  spondylolisthesis.  There is no loss of vertebral body height.  There is multilevel degenerative disc space narrowing most significant at C5-C6 and C6-C7.  There is unchanged diffuse degenerative facet arthropathy with posterior disc osteophyte complexes and uncovertebral osteophytosis resulting in varying degrees of canal and foraminal stenosis.  This is incompletely evaluated on this exam.     The included portions of the posterior fossa are normal.  The craniocervical junction is symmetric.  The atlantoaxial articulation is normal.  The airway is widely patent.  The thyroid gland is normal.  There is no cervical adenopathy.  The visualized lung apices are clear.     Impression:     Multilevel degenerative spondylolisthesis and spondylosis resulting in varying degrees of canal and foraminal stenosis, grossly unchanged from prior exam.    CT lumbar spine 06/23/2023  FINDINGS:  Five non-rib-bearing lumbar type vertebral bodies are present.  There is mild Levoscoliotic curvature of the lumbar spine centered at L3, similar compared to the prior.     With regards to alignment, there is minimal right lateral listhesis of L1 on L2 and L2 on L3.  There is minimal left lateral listhesis of L3 on L4.  No anterolisthesis or retrolisthesis.     There are chronic compression deformities through the superior endplate of T12, inferior endplate of L1 and superior endplate of L2.  Degree of vertebral body height loss is stable since 02/07/2023.  Vertebral body heights from L3-L5 are preserved.     Mild to severe degenerative disc changes are present throughout the lumbar spine, worst at the right half of L3-L4 and L4-L5.  Partially calcified disc bulges are seen at L2-L3, L3-L4, L4-L5 and L5-S1.  Evaluation for central canal narrowing is limited without intrathecal contrast.  Degrees of central canal stenosis appear worst at L3-L4 and L4-L5.     Hypertrophic facet changes are present throughout the lumbar spine, worst at L3-L4 and  L4-L5.     Paraspinal soft tissues appear within normal limits.  Both SI joints appear intact with mild degenerative changes, similar compared to the prior.     Impression:     1. Multilevel degenerative disc and hypertrophic facet changes similar compared to 02/07/2023.  2. Chronic appearing compression deformities at T12, L1 and L2, similar compared to 02/07/2023.  Dense of acute injury.      01/23/20    X-Ray Lumbar Spine Ap And Lateral  FINDINGS:  Levoscoliosis present.  Vertebral body heights stable.  Alignment unchanged without spondylolisthesis.  Similar appearing multilevel degenerative disc height loss and osteophyte findings noted.  Multilevel facet degenerative findings remain.  Aorta iliac atherosclerotic calcification.  Colonic constipation findings present.    EMG 02/24/2023  IMPRESSION  1. ABNORMAL study  2. 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  There is an acute on chronic radiculopathy of the LEFT C6 nerve root and a subacute on chronic of the RIGHT C4 and C5 nerve roots and a CHRONIC radiculopathy of bilateral C4-T1 nerve roots    Review of Systems:  CONSTITUTIONAL: patient denies any fever, chills, or weight loss  SKIN: patient denies any rash or itching  RESPIRATORY: patient denies having any shortness of breath  GASTROINTESTINAL: patient denies having any diarrhea, constipation, or bowel incontinence  GENITOURINARY: patient denies having any abnormal bladder function    MUSCULOSKELETAL:  - patient complains of the above noted pain/s (see chief pain complaint)    NEUROLOGICAL:   - pain as above  - strength in Upper and Lower extremities is decreased, BILATERALLY  - sensation in Upper extremities is intact, BILATERALLY  - patient  denies any loss of bowel or bladder control      PSYCHIATRIC: patient denies any change in mood    Other:  All other systems reviewed and are negative      Physical Exam:  LMP  (LMP Unknown)  (reviewed on 2/27/2024)  GENERAL: Well appearing, in no acute distress, alert and oriented x3.  PSYCH:  Mood and affect appropriate.  SKIN: Skin color, texture, turgor normal, no rashes or lesions.  HEAD/FACE:  Normocephalic, bruising along right lower jaw, Cranial nerves grossly intact.  PULM: No evidence of respiratory difficulty, symmetric chest rise.  GI:  Soft and non-tender.    Neck: TTP along cervical facet joints, positive facet loading,  pain with flexion, extension, and rotation - right greater than left. ROM decreased secondary to pain. 4/5 ricky strength bilateral upper extremities.  BACK: Straight leg raising in the sitting and supine positions is negative to radicular pain. pain to palpation over the facet joints of the lumbar spine or spinous processes.  Reduced range of motion with pain reproduction.    EXTREMITIES:  Peripheral joint range of motion is reduced with pain with obvious instability in bilateral lower extremities.  No deformities, edema, or skin discoloration. Good capillary refill.  MUSCULOSKELETAL:  Unable to stand on heels and toes  hip, and knee provocative maneuvers are negative.  There is no pain with palpation over the sacroiliac joints bilaterally.  Gaenslen's, Distraction/Compression and  FABERs test is negative.  Facet loading test is positive bilaterally.   Bilateral upper and lower extremity strength is normal and symmetric.  No atrophy or tone abnormalities are noted.      Shoulder: Right  - Pain on abduction: Present  - ROM:  Decreased secondary to pain  - TTP over the AC and GH joint: Present  - Neer's: Positive   - Hawkin's: Positive       NEURO: Bilateral upper and lower extremity coordination and muscle stretch reflexes are physiologic and symmetric. No loss of sensation is  noted.  GAIT:  Patient presents in wheelchair today.  Antalgic gait.      Assessment:    Shireen Felix is a 78 y.o. year old female who is presenting with   Encounter Diagnoses   Name Primary?    Cervical radiculopathy Yes    Cervical spondylosis     DDD (degenerative disc disease), cervical     Glenohumeral arthritis        Plan:    1. Interventional:  Schedule for bilateral glenohumeral joint injection to see if this helps with shoulder pain secondary to arthritis.  Of note patient received greater than 80% relief exceeding 3 months in duration with her prior shoulder injection.  We have discussed the procedures, benefits, potential risk and alternative options in detail.  Patient has elected to pursue this procedure.      Anticoagulation:  Yes, Plavix  --secondary prophylaxis: Coronary artery disease, diastolic heart failure; cardiologist: Dr. Dye  --per [facility] guidelines, patient can continue Plavix for glenohumeral joint injection.    -Of note patient has 70-80% sustained relief in neck and cervical radiculopathy following C6-7 interlaminar epidural steroid injection.  We have discussed repeating this injection in the future should symptoms exacerbate.    2. Pharmacologic:      report:  Reviewed and consistent with medication use as prescribed.      -Continue Gabapentin 100 mg BID. We have reviewed potential side effects of this medication including daytime somnolence, weight gain and peripheral edema    -Continue tizanidine 4-8 mg prn pain.We have discussed potential deleterious side effects associated with this medication including  dizziness, drowsiness, dry mouth or tingling sensation in the upper or lower extremities.     -Continue Nabumetone 500 mg BID for inflammation and pain. We have discussed potential deleterious side effects of NSAIDs on the cardiovascular, gastrointestinal and renal systems. We have discussed judicious use of this medication. Pt expresses understanding.       3.  Rehabilitative:   -We discussed continuing physician directed at home physical therapy to help manage the patient/s painful condition. The patient was counseled that muscle strengthening will improve the long term prognosis in regards to pain and may also help increase range of motion and mobility.     4. Diagnostic:  X-ray bilateral shoulders to better evaluate pain    5. Consults: (PRN)  - MD Melanie + Jennifer Atkins, PAC: [facility] arthritis/CTS    6.  Follow up: 4-6 weeks post bilateral shoulder injection    Francisco Oshea MD  Interventional Pain Medicine        The above plan and management options were discussed at length with patient. Patient is in agreement with the above and verbalized understanding.  - I discussed the goals of interventional chronic pain management with the patient on today's visit. We discussed a multimodal and systematic approach to pain.  This includes diagnostic and therapeutic injections, adjuvant pharmacologic treatment, physical therapy, and at times psychiatry.  I emphasized the importance of regular exercise, core strengthening and stretching, diet and weight loss as a cornerstone of long-term pain management.    - This condition does not require this patient to take time off of work, and the primary goal of our Pain Management services is to improve the patient's functional capacity.  - Patient Questions: Answered all of the patient's questions regarding diagnoses, therapy, treatment and next steps    Disclaimer:  This note may have been prepared using voice recognition software, it may have not been extensively proofed, as such there could be errors within the text such as sound alike errors.

## 2024-02-27 NOTE — TELEPHONE ENCOUNTER
Attempt to reach patient to schedule procedure . The patient did not answer. Left voice message on patients voice box to call back at earliest convenience to schedule apt.     Jimi Stockton  Medical Assistant

## 2024-03-22 ENCOUNTER — TELEPHONE (OUTPATIENT)
Dept: PULMONOLOGY | Facility: CLINIC | Age: 79
End: 2024-03-22
Payer: MEDICARE

## 2024-03-22 NOTE — TELEPHONE ENCOUNTER
----- Message from Jerrod House sent at 3/22/2024  2:18 PM CDT -----  Regarding: pt meds  Pharmacy Calling to Clarify an RX:[facility]       Name of Caller:Pharmacist       Pharmacy Name       Prescription Name oteseconazole 150 mg Cap      What do they need to clarify? Clarify correct directions on script please advise       Best Call Back Number [phone]         Additional Information:

## 2024-03-26 ENCOUNTER — LAB VISIT (OUTPATIENT)
Dept: LAB | Facility: HOSPITAL | Age: 79
End: 2024-03-26
Payer: MEDICARE

## 2024-03-26 ENCOUNTER — OFFICE VISIT (OUTPATIENT)
Dept: ORTHOPEDICS | Facility: CLINIC | Age: 79
End: 2024-03-26
Payer: MEDICARE

## 2024-03-26 ENCOUNTER — TELEPHONE (OUTPATIENT)
Dept: ORTHOPEDICS | Facility: CLINIC | Age: 79
End: 2024-03-26
Payer: MEDICARE

## 2024-03-26 VITALS — BODY MASS INDEX: 19.2 KG/M2 | WEIGHT: 112.44 LBS | HEIGHT: 64 IN

## 2024-03-26 DIAGNOSIS — M10.9 GOUT OF RIGHT HAND, UNSPECIFIED CAUSE, UNSPECIFIED CHRONICITY: ICD-10-CM

## 2024-03-26 DIAGNOSIS — L03.011 CELLULITIS OF RIGHT THUMB: ICD-10-CM

## 2024-03-26 DIAGNOSIS — M10.9 GOUT OF RIGHT HAND, UNSPECIFIED CAUSE, UNSPECIFIED CHRONICITY: Primary | ICD-10-CM

## 2024-03-26 LAB — URATE SERPL-MCNC: 4 MG/DL (ref 2.4–5.7)

## 2024-03-26 PROCEDURE — 36415 COLL VENOUS BLD VENIPUNCTURE: CPT

## 2024-03-26 PROCEDURE — 99999 PR PBB SHADOW E&M-EST. PATIENT-LVL IV: CPT | Mod: PBBFAC,,,

## 2024-03-26 PROCEDURE — 99214 OFFICE O/P EST MOD 30 MIN: CPT | Mod: S$GLB,,,

## 2024-03-26 PROCEDURE — 84550 ASSAY OF BLOOD/URIC ACID: CPT

## 2024-03-26 PROCEDURE — 3288F FALL RISK ASSESSMENT DOCD: CPT | Mod: CPTII,S$GLB,,

## 2024-03-26 PROCEDURE — 1101F PT FALLS ASSESS-DOCD LE1/YR: CPT | Mod: CPTII,S$GLB,,

## 2024-03-26 PROCEDURE — 1125F AMNT PAIN NOTED PAIN PRSNT: CPT | Mod: CPTII,S$GLB,,

## 2024-03-26 PROCEDURE — 1159F MED LIST DOCD IN RCRD: CPT | Mod: CPTII,S$GLB,,

## 2024-03-26 PROCEDURE — 3072F LOW RISK FOR RETINOPATHY: CPT | Mod: CPTII,S$GLB,,

## 2024-03-26 RX ORDER — LINEZOLID 600 MG/1
600 TABLET, FILM COATED ORAL EVERY 12 HOURS
Qty: 14 TABLET | Refills: 0 | Status: SHIPPED | OUTPATIENT
Start: 2024-03-26

## 2024-03-26 NOTE — TELEPHONE ENCOUNTER
Spoke to the patient who stated that she went to the ER at [facility] in [place] on 3/24/2024 for her Right thumb swelling she stated that they drained it an stated that they think it may be Gout they prescribed her Prednisone Norco Ibuprofen an Zofran an stated to follow up with Jennifer. The patient is requesting for Jennifer to sent her Antibiotic an I explained to her that we need to see her in clinic before she can prescribe anything the patient verbalized understanding an I was able to get her scheduled fro tomorrow morning at 9:30 per her request an she stated that if she can make it in today she will give us a call.           ----- Message from Shanelle Maguire sent at 3/26/2024  8:39 AM CDT -----  Contact: IZABELLA SHEA [[id number]]  ..Type:  Patient Requesting Call    Who Called: IZABELLA SHEA [[id number]]  Does the patient know what this is regarding?: wants to speak w/ nurse regarding hand  Would the patient rather a call back or a response via [facility]?  Call   Best Call Back Number: .[phone] (home)   Additional Information:

## 2024-03-26 NOTE — PROGRESS NOTES
SUBJECTIVE:      Chief Complaint: Pain and Swelling of the Right Hand    Referring Provider: No ref. provider found     History of Present Illness:  The patient is a 78 y.o female who presents today for right hand pain and swelling. She went to [facility] ED on 3/24/24 for right thumb pain, swelling, and redness. A therapeutic and diagnostic paracentesis of the thumb was performed. Norco, NSAIDs, steroids were provided for treatment.   Today, the patient rates her pain a 10/10. Onset of symptoms was about 1 week ago, denies injury or trauma to area. The symptoms start at the thumb CMC joint and then her whole hand swells up. Pain is throbbing in quality. States that the symptoms have gotten better since onset. She admits to previous episodes. She denies fever, chills, sweats and presents for follow up.    Interval hx 1/30/24: The patient is a 78-year-old female who presents today with bilateral thumb basal joint pain and bilateral carpal tunnel syndrome follow up. Her last injection was 3/21/23, which gave her good relief. Her pain is 8/10 today. States that her left 1st basal joint has been red, hot, and swollen for a few days but has gotten better since onset. She reports that this is typical for her and that other joints will have these flare-ups too. Also, she states that she will drop things due to the hand numbness. She is not taking any medication for pain but states that ibuprofen 800mg has helped her in the past. She requests repeat injections today. Denies fever, chills, sweats.    Interval hx 9/15/23: Patient is a 78 y.o. female who presents today with complaints of bilateral CMC thumb joint pain. Pain is 8/10, reports difficulty with gripping. She has noticed redness to the R thumb that has gotten worse since onset 2 days ago. Her last bilateral CMC injection was on 3/21/23 with good results until recently. She would like repeat injections today. Denies hx of gout. The patient denies any fevers,  chills, N/V, D/C and presents for evaluation.    Interval hx 3/21/23 (Dr. 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  She also has significant cervical radiculopathy from C4-T1 bilaterally.  She was last injected 06/01/2022 and requests reinjection thumbs and carpal tunnels today    Interval hx 6/1/22 (Dr. 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 She states that she still has pain around the CMC joint of both thumbs. She was last injected 02/16/22 with good relief until recently and requests bilateral reinjection today. Patient also states that her right index MCP joint has been swollen and bothering her. She has thumb spica splints. Denies fever, chills, nausea, vomiting, CP, SOB.    Past Medical History:   Diagnosis Date    Abnormal ECG 8/5/2019    Aneurysm     Anticoagulant long-term use     Arthritis     CAD in native artery 8/5/2019    COPD (chronic obstructive pulmonary disease)     Diabetes mellitus     Fall 10/10/2019    Formatting of this note might be different from the original. Found sitting on floor next to bed last night Mild confusion today Does not recall falling or how she ended up on floor UA today Labs yesterday unremarkable    Glaucoma     Hypertension     Seizures     Shingles 05/27/2017    Stroke      Past Surgical History:   Procedure Laterality Date    BRAIN SURGERY      CARDIAC CATHETERIZATION      COLONOSCOPY N/A 09/21/2023    Procedure: COLONOSCOPY;  Surgeon: Joanna Leal MD;  Location: [facility] ENDO;  Service: Endoscopy;  Laterality: N/A;    CORONARY ANGIOPLASTY      EPIDURAL STEROID INJECTION INTO CERVICAL SPINE N/A 2/7/2024    Procedure: Cervical IL XANDER, Level C6/7, RN IV sedation;  Surgeon: Francisco Oshea MD;  Location: [facility] PAIN MGT;  Service: Pain Management;   Laterality: N/A;    EPIDURAL STEROID INJECTION INTO LUMBAR SPINE Bilateral 11/12/2019    Procedure: TF XANDER L4/5;  Surgeon: Desean Dias MD;  Location: [facility] PAIN MGT;  Service: Pain Management;  Laterality: Bilateral;    HYSTERECTOMY      INJECTION OF ANESTHETIC AGENT AROUND MEDIAL BRANCH NERVES INNERVATING LUMBAR FACET JOINT Bilateral 01/31/2020    Procedure: Bilateral L3-5 MBB;  Surgeon: Desean Dias MD;  [facility];  Service: Pain Management;  Laterality: Bilateral;    INJECTION OF ANESTHETIC AGENT INTO SACROILIAC JOINT Right 11/16/2021    Procedure: Right BLOCK, SACROILIAC JOINT Right GTB with RN IV sedation;  Surgeon: Yao Fulton MD;  Location: [facility] PAIN MGT;  Service: Pain Management;  Laterality: Right;    INJECTION OF ANESTHETIC AGENT INTO SACROILIAC JOINT Bilateral 07/28/2023    Procedure: Bilateral GT bursa + bilateral SIJ injection;  Surgeon: Francisco Oshea MD;  Location: [facility] PAIN MGT;  Service: Pain Management;  Laterality: Bilateral;    INJECTION OF JOINT Bilateral 07/09/2020    Procedure: Bilateral shoulder GH Joint injection with local;  Surgeon: Desean Dias MD;  Location: [facility] PAIN MGT;  Service: Pain Management;  Laterality: Bilateral;    INJECTION OF JOINT Bilateral 07/28/2023    Procedure: Bilateral GT bursa + bilateral SIJ injection NEEDS ADDITIONAL SEDATION AND MORE TIME BEFORE INJECTION RN IV Sedation;  Surgeon: Francisco Oshea MD;  Location: [facility] PAIN MGT;  Service: Pain Management;  Laterality: Bilateral;    INJECTION OF JOINT N/A 10/25/2023    Procedure: Sacrococcygeal joint injection;  Surgeon: Francisco Oshea MD;  Location: [facility];  Service: Pain Management;  Laterality: N/A;    OOPHORECTOMY      SELECTIVE INJECTION OF ANESTHETIC AGENT AROUND LUMBAR SPINAL NERVE ROOT BY TRANSFORAMINAL APPROACH Bilateral 04/26/2023    Procedure: Bilateral L4/5 TF XANDER RN IV Sedation;  Surgeon: Francisco Oshea MD;  Location: [facility] PAIN MGT;  Service: Pain Management;   Laterality: Bilateral;    TRANSFORAMINAL EPIDURAL INJECTION OF STEROID Bilateral 07/23/2019    Procedure: Bilateral L3/4 Transforaminal Epidural Steroid Injection;  Surgeon: Desean Dias MD;  Location: [facility] PAIN MGT;  Service: Pain Management;  Laterality: Bilateral;    TRANSFORAMINAL EPIDURAL INJECTION OF STEROID Bilateral 03/10/2020    Procedure: Right T12/L1 TF XANDER with local;  Surgeon: Desean Dias MD;  Location: [facility] PAIN MGT;  Service: Pain Management;  Laterality: Bilateral;    TRANSFORAMINAL EPIDURAL INJECTION OF STEROID Right 06/19/2020    Procedure: Right T12/L1 TFESI Covid day of procedure;  Surgeon: Desean Dias MD;  Location: [facility] PAIN MGT;  Service: Pain Management;  Laterality: Right;     Review of patient's allergies indicates:   Allergen Reactions    Penicillins Anaphylaxis, Hives, Shortness Of Breath and Swelling    Adhesive Rash    Doxycycline Nausea Only    Oxycodone Other (See Comments)     Fatigue      Sulfa (sulfonamide antibiotics) Itching     Social History     Social History Narrative    No pets or smokers in household.     Family History   Problem Relation Age of Onset    No Known Problems Mother     No Known Problems Father     Breast cancer Maternal Aunt     Breast cancer Maternal Aunt     Breast cancer Maternal Aunt          Current Outpatient Medications:     albuterol (PROVENTIL) 2.5 mg /3 mL (0.083 %) nebulizer solution, EMPTY 1 VIAL INTO NEBULIZER EVERY 4 HOURS AS NEEDED FRO RESCUE, Disp: 180 mL, Rfl: 11    albuterol (PROVENTIL/VENTOLIN HFA) 90 mcg/actuation inhaler, INHALE TWO PUFFS INTO THE LUNGS EVERY 4 HOURS AS NEEDED, Disp: 18 g, Rfl: 11    APTIOM 400 mg Tab tablet, Take 400 mg by mouth., Disp: , Rfl:     atorvastatin (LIPITOR) 40 MG tablet, TAKE 1 TABLET BY MOUTH ONCE DAILY, Disp: 90 tablet, Rfl: 1    carvediloL (COREG) 25 MG tablet, Take 25 mg by mouth 2 (two) times daily., Disp: , Rfl:     carvediloL (COREG) 6.25 MG tablet, Take 1 tablet (6.25 mg total) by mouth  2 (two) times daily with meals., Disp: 180 tablet, Rfl: 1    cholecalciferol, vitamin D3, 1,250 mcg (50,000 unit) capsule, Take 1 capsule (50,000 Units total) by mouth every 7 days., Disp: 12 capsule, Rfl: 0    cholestyramine (QUESTRAN) 4 gram packet, TAKE 1 PACKET BY MOUTH TWICE DAILY AS DIRECTED, Disp: 60 packet, Rfl: 11    clopidogreL (PLAVIX) 75 mg tablet, Take 1 tablet (75 mg total) by mouth once daily., Disp: 90 tablet, Rfl: 3    denosumab (PROLIA) 60 mg/mL Syrg, every 6 (six) months., Disp: , Rfl:     dicyclomine (BENTYL) 10 MG capsule, Take 1 capsule (10 mg total) by mouth 2 (two) times daily., Disp: 10 capsule, Rfl: 0    diltiaZEM (CARDIZEM CD) 120 MG Cp24, Take 2 capsules (240 mg total) by mouth once daily., Disp: 180 capsule, Rfl: 1    dorzolamide-timolol 2-0.5% (COSOPT) 22.3-6.8 mg/mL ophthalmic solution, Place 1 drop into both eyes every 12 (twelve) hours., Disp: 10 mL, Rfl: 12    EScitalopram oxalate (LEXAPRO) 20 MG tablet, TAKE 1 TABLET BY MOUTH ONCE DAILY, Disp: 90 tablet, Rfl: 2    fluocinonide (LIDEX) 0.05 % external solution, Apply topically 2 (two) times daily. Use on itchy spots on scalp and ear, Disp: 60 mL, Rfl: 2    fluticasone propionate (FLONASE) 50 mcg/actuation nasal spray, USE 2 SPRAYS INTO EACH NOSTRIL ONCE A DAY AT 6AM AS DIRECTED, Disp: 16 g, Rfl: 11    fluticasone-umeclidin-vilanter (TRELEGY ELLIPTA) 200-62.5-25 mcg inhaler, INHALE 1 PUFF INTO THE LUNGS DAILY, Disp: 60 each, Rfl: 11    furosemide (LASIX) 20 MG tablet, Take 20 mg by mouth daily as needed., Disp: , Rfl:     gabapentin (NEURONTIN) 300 MG capsule, Take 1 capsule (300 mg total) by mouth 3 (three) times daily., Disp: 90 capsule, Rfl: 1    HYDROcodone-acetaminophen (NORCO) 5-325 mg per tablet, Take 1 tablet by mouth every 6 (six) hours as needed for Pain., Disp: , Rfl:     hydrOXYzine HCL (ATARAX) 25 MG tablet, Take 1 tablet (25 mg total) by mouth every evening., Disp: 30 tablet, Rfl: 2    ibuprofen (ADVIL,MOTRIN) 800 MG  tablet, Take 1 tablet (800 mg total) by mouth 3 (three) times daily., Disp: 60 tablet, Rfl: 0    ipratropium (ATROVENT) 42 mcg (0.06 %) nasal spray, USE 2 SPRAYS INTO EACH NOSTRIL FOUR TIMES DAILY, Disp: 15 mL, Rfl: 11    isosorbide mononitrate (IMDUR) 60 MG 24 hr tablet, Take 1 tablet (60 mg total) by mouth every evening., Disp: 90 tablet, Rfl: 1    ketoconazole (NIZORAL) 2 % cream, Apply topically 2 (two) times daily. For dry flaky patches of ear., Disp: 30 g, Rfl: 1    latanoprost 0.005 % ophthalmic solution, Place 1 drop into both eyes every evening., Disp: 2.5 mL, Rfl: 12    levETIRAcetam (KEPPRA) 500 MG Tab, Take 1 tablet (500 mg total) by mouth 2 (two) times daily., Disp: 180 tablet, Rfl: 3    magnesium oxide (MAG-OX) 400 mg (241.3 mg magnesium) tablet, Take 1 tablet (400 mg total) by mouth once daily., Disp: 90 tablet, Rfl: 1    metoprolol tartrate (LOPRESSOR) 100 MG tablet, Take 100 mg by mouth 2 (two) times daily., Disp: , Rfl:     montelukast (SINGULAIR) 10 mg tablet, Take 1 tablet (10 mg total) by mouth once daily., Disp: 90 tablet, Rfl: 3    nitroGLYCERIN (NITROSTAT) 0.4 MG SL tablet, DISSOLVE 1 TABLET UNDER TONGUE EVERY 5 MINUTES FOR CHEST PAIN (MAY TAKE UP TO 3 DOSES THEN SEEK MEDICAL ATTENTION), Disp: 25 tablet, Rfl: 0    omeprazole (PRILOSEC) 40 MG capsule, Take 40 mg by mouth every morning., Disp: , Rfl:     ondansetron (ZOFRAN-ODT) 4 MG TbDL, Take 1 tablet (4 mg total) by mouth every 6 (six) hours as needed., Disp: 30 tablet, Rfl: 0    oteseconazole 150 mg Cap, Take 150 mg by mouth every 72 hours. X 3 doses, Disp: 3 capsule, Rfl: 1    pantoprazole (PROTONIX) 40 MG tablet, Take 40 mg by mouth 2 (two) times daily., Disp: , Rfl:     potassium chloride SA (K-DUR,KLOR-CON) 20 MEQ tablet, Take 1 tablet (20 mEq total) by mouth once daily. Take extra dose with Lasix - fluid pill, Disp: 90 tablet, Rfl: 1    predniSONE (DELTASONE) 10 MG tablet, 2 tabs x 10 days. 1 daily x 20 days., Disp: 40 tablet, Rfl: 0    " promethazine-dextromethorphan (PROMETHAZINE-DM) 6.25-15 mg/5 mL Syrp, Take 5 mLs by mouth every 6 (six) hours as needed (cough)., Disp: 240 mL, Rfl: 0    ranolazine (RANEXA) 1,000 mg Tb12, TAKE 1 TABLET BY MOUTH TWICE DAILY, Disp: 180 tablet, Rfl: 3    roflumilast (DALIRESP) 500 mcg Tab, TAKE ONE TABLET BY MOUTH DAILY, Disp: 90 tablet, Rfl: 3    sodium chloride 3% 3 % nebulizer solution, Take 4 mLs by nebulization as needed for Cough (chest congestion)., Disp: 360 mL, Rfl: 11    tiZANidine (ZANAFLEX) 4 MG tablet, Take 4 mg by mouth every 8 (eight) hours., Disp: , Rfl:     triamcinolone acetonide 0.025% (KENALOG) 0.025 % cream, Apply topically 2 (two) times daily. PRN rash and itching of ear. Mild steroid cream., Disp: 30 g, Rfl: 0    linezolid (ZYVOX) 600 mg Tab, Take 1 tablet (600 mg total) by mouth every 12 (twelve) hours., Disp: 14 tablet, Rfl: 0    MYRBETRIQ 50 mg Tb24, TAKE 1 TABLET BY MOUTH ONCE DAILY, Disp: 30 tablet, Rfl: 0      Review of Systems:  Constitutional: Negative for chills and fever.   Respiratory: Negative for cough and shortness of breath.    Gastrointestinal: Negative for nausea and vomiting.   Skin: Negative for rash.   Neurological: Negative for dizziness and headaches.   Psychiatric/Behavioral: Negative for depression.   MSK as in HPI     OBJECTIVE:      Vital Signs (Most Recent):  Vitals:    03/26/24 1313   Weight: 51 kg (112 lb 7 oz)   Height: 5' 4" (1.626 m)         Body mass index is 19.3 kg/m².      Physical Exam:  Constitutional: The patient appears well-developed and well-nourished. No distress. +anxious  Skin: No lesions appreciated  Head: Normocephalic and atraumatic.   Nose: Nose normal.   Ears: No deformities seen  Eyes: Conjunctivae and EOM are normal.   Neck: No tracheal deviation present.   Cardiovascular: Normal rate and intact distal pulses.    Pulmonary/Chest: Effort normal. No respiratory distress.   Abdominal: There is no guarding.   Neurological: The patient is alert. "   Psychiatric: The patient has a normal mood and affect.     General    Vitals reviewed.            Right Hand/Wrist Exam     Inspection   Scars: Wrist - absent Hand -  absent  Effusion: Wrist - absent Hand -  present  Deformity:  Hand -  deformity (arthritic changes)    Pain   Wrist - The patient exhibits pain of the CMC.    Swelling   Wrist - The patient is swollen on the CMC.    Other     Neuorologic Exam    Median Distribution: abnormal  Ulnar Distribution: abnormal  Radial Distribution: normal    Comments:  CMC joint and dorsal hand edematous, mildly erythematous, warm to touch, tender to palpation  Mild ecchymosis at CMC joint  Wrist ROM not tested 2/2 pain and swelling  Skin intact, no lesions or abrasions  No motor or sensory deficits      Left Hand/Wrist Exam     Inspection   Scars: Wrist - absent Hand -  absent  Effusion: Wrist - absent Hand -  absent  Deformity: Wrist - present (arthritic changes)     Other     Sensory Exam  Median Distribution: abnormal  Ulnar Distribution: normal  Radial Distribution: normal          Vascular Exam       Capillary Refill  Right Hand: normal capillary refill  Left Hand: normal capillary refill          Diagnostic Results:  EXAMINATION:  XR HAND COMPLETE 3 VIEWS BILATERAL     CLINICAL HISTORY:  . Pain in right hand     TECHNIQUE:  PA, lateral, and oblique views of both hands were performed.     COMPARISON:  08/06/2020     FINDINGS:  No acute osseous abnormality with similar OA findings present most pronounced within the left greater than right 1st CMC joints.  Soft tissues unremarkable.     Impression:     Similar appearance of the hands/wrists        Electronically signed by: Davy Gutierrez MD  Date:                                            09/15/2023  Time:                                           09:36     Imaging - I independently viewed the patient's imaging as well as the radiology report.  Xrays of the patient's bilateral hands demonstrate bilateral 1st cmc  arthritis, no evidence of any acute fractures or dislocations.    ASSESSMENT/PLAN:        ICD-10-CM ICD-9-CM   1. Gout of right hand, unspecified cause, unspecified chronicity  M10.9 274.00   2. Cellulitis of right thumb  L03.011 681.00     Orders Placed This Encounter    URIC ACID    linezolid (ZYVOX) 600 mg Tab     Orders Placed This Encounter   Procedures    URIC ACID     Plan:     - xrays from 9/15/23 reviewed, show bilateral thumb cmc arthritis  - case discussed with Dr. Navarro, who evaluated the patient face to face today and agrees with plan. He recommends drawing a uric acid lab and treating with antibiotics at this time.  - Synovial fluid labs reviewed from 3/24/24 -- no crystals seen, TNCC [id number]  - Linezolid 600mg q12h sent to pharmacy (pt allergic to PCN, doxy, sulfa. clindamycin causes GI upset)   - will follow uric acid lab and refer to rheumatology if elevated for gout treatment  - ace wrap and sling provided for comfort  - f/u in 1 week / sooner if needed    Should the patient's symptoms worsen, persist, or fail to improve they should return for reevaluation and I would be happy to see them back anytime.        Jennifer Atkins PA-C   [facility]     Please be aware that this note has been generated with the assistance of [employer] voice-to-text.  Please excuse any spelling or grammatical errors.

## 2024-03-27 ENCOUNTER — TELEPHONE (OUTPATIENT)
Dept: ORTHOPEDICS | Facility: CLINIC | Age: 79
End: 2024-03-27
Payer: MEDICARE

## 2024-03-27 NOTE — TELEPHONE ENCOUNTER
Spoke to the patient an was able to let her know that unfortunately that we can not prescribe her any pain medication for this an that she needs to continue to take the medication that was prescribed to her. The patient verbalize understanding       ----- Message from Abdiel Willard sent at 3/27/2024  2:17 PM CDT -----  Contact: Jetty  Patient is requesting a call back concerning medication, reports Np stated refill Narco that was prescribed by ER  but pharmacy hasn't received a script and only has 1 pill left. Please call pt if needed [phone]             [facility] [address] [facility]  [address] [facility] [address]  Phone: [phone] Fax: [phone]

## 2024-03-28 ENCOUNTER — OFFICE VISIT (OUTPATIENT)
Dept: RHEUMATOLOGY | Facility: CLINIC | Age: 79
End: 2024-03-28
Payer: MEDICARE

## 2024-03-28 VITALS
BODY MASS INDEX: 19.2 KG/M2 | HEIGHT: 64 IN | DIASTOLIC BLOOD PRESSURE: 76 MMHG | HEART RATE: 77 BPM | SYSTOLIC BLOOD PRESSURE: 145 MMHG | WEIGHT: 112.44 LBS

## 2024-03-28 DIAGNOSIS — M81.0 AGE-RELATED OSTEOPOROSIS WITHOUT CURRENT PATHOLOGICAL FRACTURE: ICD-10-CM

## 2024-03-28 DIAGNOSIS — M25.539 PAIN IN WRIST, UNSPECIFIED LATERALITY: ICD-10-CM

## 2024-03-28 DIAGNOSIS — D48.19 TENOSYNOVIAL GIANT CELL TUMOR: ICD-10-CM

## 2024-03-28 DIAGNOSIS — M12.20 PIGMENTED VILLONODULAR SYNOVITIS: Primary | ICD-10-CM

## 2024-03-28 DIAGNOSIS — Z51.81 MEDICATION MONITORING ENCOUNTER: ICD-10-CM

## 2024-03-28 PROCEDURE — 1125F AMNT PAIN NOTED PAIN PRSNT: CPT | Mod: CPTII,S$GLB,, | Performed by: INTERNAL MEDICINE

## 2024-03-28 PROCEDURE — 3072F LOW RISK FOR RETINOPATHY: CPT | Mod: CPTII,S$GLB,, | Performed by: INTERNAL MEDICINE

## 2024-03-28 PROCEDURE — 3078F DIAST BP <80 MM HG: CPT | Mod: CPTII,S$GLB,, | Performed by: INTERNAL MEDICINE

## 2024-03-28 PROCEDURE — 3077F SYST BP >= 140 MM HG: CPT | Mod: CPTII,S$GLB,, | Performed by: INTERNAL MEDICINE

## 2024-03-28 PROCEDURE — 1101F PT FALLS ASSESS-DOCD LE1/YR: CPT | Mod: CPTII,S$GLB,, | Performed by: INTERNAL MEDICINE

## 2024-03-28 PROCEDURE — 99215 OFFICE O/P EST HI 40 MIN: CPT | Mod: S$GLB,,, | Performed by: INTERNAL MEDICINE

## 2024-03-28 PROCEDURE — 99999 PR PBB SHADOW E&M-EST. PATIENT-LVL V: CPT | Mod: PBBFAC,,, | Performed by: INTERNAL MEDICINE

## 2024-03-28 PROCEDURE — 1159F MED LIST DOCD IN RCRD: CPT | Mod: CPTII,S$GLB,, | Performed by: INTERNAL MEDICINE

## 2024-03-28 PROCEDURE — 3288F FALL RISK ASSESSMENT DOCD: CPT | Mod: CPTII,S$GLB,, | Performed by: INTERNAL MEDICINE

## 2024-03-28 NOTE — PROGRESS NOTES
RHEUMATOLOGY OUTPATIENT CLINIC NOTE    3/28/2024    Attending Rheumatologist: Julien Hernandez  Primary Care Provider/Physician Requesting Consultation: Laron Chaudhari MD   Chief Complaint/Reason For Consultation:  Osteoporosis (Hand pain) and Gout      Subjective:     Shireen Felix is a 78 y.o. White female with OP and inflammatory arthritis    Refractory Rt hand pain with inflammatory characteristics.  Increasing in frequency and duration.  On zyvox per orthopedics s/p recent wrist arthrocentesis.    Addendum: MRI may not be performed due to hx of brain x2-stent and coil.  Follow with orthopedics: need to completely exclude PVNS/Tenosynovial giant cell tumor (TGCT): synovial biopsy?  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    Review of Systems   Constitutional:  Negative for fever. Weight loss: nephrolithias.  Eyes:  Positive for pain. Negative for photophobia.   Respiratory:  Negative for cough and shortness of breath.    Cardiovascular:  Negative for chest pain.   Gastrointestinal:  Negative for blood in stool and melena.   Genitourinary:  Negative for dysuria, hematuria and urgency.        No hx of nephrolithiasis   Musculoskeletal:  Positive for joint pain.        Denies classic gout precipitants.    Skin:  Negative for rash.        No hx of PsO   Neurological:  Negative for headaches.     Chronic comorbid conditions affecting medical decision making today:  Past Medical History:   Diagnosis Date    Abnormal ECG 8/5/2019    Aneurysm     Anticoagulant long-term use     Arthritis     CAD in native artery 8/5/2019    COPD (chronic obstructive pulmonary disease)     Diabetes mellitus     Fall 10/10/2019    Formatting of this note might be different from the original. Found sitting on floor next to bed last night Mild confusion today Does not recall falling or how she ended up on floor UA today Labs yesterday unremarkable    Glaucoma     Hypertension      Seizures     Shingles 05/27/2017    Stroke      Past Surgical History:   Procedure Laterality Date    BRAIN SURGERY      CARDIAC CATHETERIZATION      COLONOSCOPY N/A 09/21/2023    Procedure: COLONOSCOPY;  Surgeon: Joanna Leal MD;  Location: [facility] ENDO;  Service: Endoscopy;  Laterality: N/A;    CORONARY ANGIOPLASTY      EPIDURAL STEROID INJECTION INTO CERVICAL SPINE N/A 2/7/2024    Procedure: Cervical IL XANDER, Level C6/7, RN IV sedation;  Surgeon: Francisco Oshea MD;  Location: [facility] PAIN MGT;  Service: Pain Management;  Laterality: N/A;    EPIDURAL STEROID INJECTION INTO LUMBAR SPINE Bilateral 11/12/2019    Procedure: TF XANDER L4/5;  Surgeon: Desean Dias MD;  Location: [facility] PAIN MGT;  Service: Pain Management;  Laterality: Bilateral;    HYSTERECTOMY      INJECTION OF ANESTHETIC AGENT AROUND MEDIAL BRANCH NERVES INNERVATING LUMBAR FACET JOINT Bilateral 01/31/2020    Procedure: Bilateral L3-5 MBB;  Surgeon: Desean Dias MD;  Location: [facility] PAIN MGT;  Service: Pain Management;  Laterality: Bilateral;    INJECTION OF ANESTHETIC AGENT INTO SACROILIAC JOINT Right 11/16/2021    Procedure: Right BLOCK, SACROILIAC JOINT Right GTB with RN IV sedation;  Surgeon: Yao Fulton MD;  Location: [facility] PAIN MGT;  Service: Pain Management;  Laterality: Right;    INJECTION OF ANESTHETIC AGENT INTO SACROILIAC JOINT Bilateral 07/28/2023    Procedure: Bilateral GT bursa + bilateral SIJ injection;  Surgeon: Francisco Oshea MD;  Location: [facility] PAIN MGT;  Service: Pain Management;  Laterality: Bilateral;    INJECTION OF JOINT Bilateral 07/09/2020    Procedure: Bilateral shoulder GH Joint injection with local;  Surgeon: Desean Dias MD;  Location: [facility] PAIN MGT;  Service: Pain Management;  Laterality: Bilateral;    INJECTION OF JOINT Bilateral 07/28/2023    Procedure: Bilateral GT bursa + bilateral SIJ injection NEEDS ADDITIONAL SEDATION AND MORE TIME BEFORE INJECTION RN IV Sedation;  Surgeon: Francisco Oshea MD;  Location:  [facility] PAIN MGT;  Service: Pain Management;  Laterality: Bilateral;    INJECTION OF JOINT N/A 10/25/2023    Procedure: Sacrococcygeal joint injection;  Surgeon: Francisco Oshea MD;  [facility];  Service: Pain Management;  Laterality: N/A;    OOPHORECTOMY      SELECTIVE INJECTION OF ANESTHETIC AGENT AROUND LUMBAR SPINAL NERVE ROOT BY TRANSFORAMINAL APPROACH Bilateral 2023    Procedure: Bilateral L4/5 TF XANDER RN IV Sedation;  Surgeon: Francisco Oshea MD;  Location: [facility] PAIN MGT;  Service: Pain Management;  Laterality: Bilateral;    TRANSFORAMINAL EPIDURAL INJECTION OF STEROID Bilateral 2019    Procedure: Bilateral L3/4 Transforaminal Epidural Steroid Injection;  Surgeon: Desean Dias MD;  [facility];  Service: Pain Management;  Laterality: Bilateral;    TRANSFORAMINAL EPIDURAL INJECTION OF STEROID Bilateral 03/10/2020    Procedure: Right T12/L1 TF XANDER with local;  Surgeon: Desean Dias MD;  [facility];  Service: Pain Management;  Laterality: Bilateral;    TRANSFORAMINAL EPIDURAL INJECTION OF STEROID Right 2020    Procedure: Right T12/L1 TFESI Covid day of procedure;  Surgeon: Desean Dias MD;  Location: [facility] PAIN MGT;  Service: Pain Management;  Laterality: Right;     Family History   Problem Relation Age of Onset    No Known Problems Mother     No Known Problems Father     Breast cancer Maternal Aunt     Breast cancer Maternal Aunt     Breast cancer Maternal Aunt      Social History     Tobacco Use   Smoking Status Former    Current packs/day: 0.00    Average packs/day: 1 pack/day for 42.3 years (42.3 ttl pk-yrs)    Types: Cigarettes    Start date:     Quit date: 2004    Years since quittin.9   Smokeless Tobacco Former       Current Outpatient Medications:     albuterol (PROVENTIL) 2.5 mg /3 mL (0.083 %) nebulizer solution, EMPTY 1 VIAL INTO NEBULIZER EVERY 4 HOURS AS NEEDED FRO RESCUE, Disp: 180 mL, Rfl: 11    albuterol  (PROVENTIL/VENTOLIN HFA) 90 mcg/actuation inhaler, INHALE TWO PUFFS INTO THE LUNGS EVERY 4 HOURS AS NEEDED, Disp: 18 g, Rfl: 11    APTIOM 400 mg Tab tablet, Take 400 mg by mouth., Disp: , Rfl:     atorvastatin (LIPITOR) 40 MG tablet, TAKE 1 TABLET BY MOUTH ONCE DAILY, Disp: 90 tablet, Rfl: 1    carvediloL (COREG) 25 MG tablet, Take 25 mg by mouth 2 (two) times daily., Disp: , Rfl:     carvediloL (COREG) 6.25 MG tablet, Take 1 tablet (6.25 mg total) by mouth 2 (two) times daily with meals., Disp: 180 tablet, Rfl: 1    cholecalciferol, vitamin D3, 1,250 mcg (50,000 unit) capsule, Take 1 capsule (50,000 Units total) by mouth every 7 days., Disp: 12 capsule, Rfl: 0    cholestyramine (QUESTRAN) 4 gram packet, TAKE 1 PACKET BY MOUTH TWICE DAILY AS DIRECTED, Disp: 60 packet, Rfl: 11    clopidogreL (PLAVIX) 75 mg tablet, Take 1 tablet (75 mg total) by mouth once daily., Disp: 90 tablet, Rfl: 3    denosumab (PROLIA) 60 mg/mL Syrg, every 6 (six) months., Disp: , Rfl:     dicyclomine (BENTYL) 10 MG capsule, Take 1 capsule (10 mg total) by mouth 2 (two) times daily., Disp: 10 capsule, Rfl: 0    diltiaZEM (CARDIZEM CD) 120 MG Cp24, Take 2 capsules (240 mg total) by mouth once daily., Disp: 180 capsule, Rfl: 1    dorzolamide-timolol 2-0.5% (COSOPT) 22.3-6.8 mg/mL ophthalmic solution, Place 1 drop into both eyes every 12 (twelve) hours., Disp: 10 mL, Rfl: 12    EScitalopram oxalate (LEXAPRO) 20 MG tablet, TAKE 1 TABLET BY MOUTH ONCE DAILY, Disp: 90 tablet, Rfl: 2    fluocinonide (LIDEX) 0.05 % external solution, Apply topically 2 (two) times daily. Use on itchy spots on scalp and ear, Disp: 60 mL, Rfl: 2    fluticasone propionate (FLONASE) 50 mcg/actuation nasal spray, USE 2 SPRAYS INTO EACH NOSTRIL ONCE A DAY AT 6AM AS DIRECTED, Disp: 16 g, Rfl: 11    fluticasone-umeclidin-vilanter (TRELEGY ELLIPTA) 200-62.5-25 mcg inhaler, INHALE 1 PUFF INTO THE LUNGS DAILY, Disp: 60 each, Rfl: 11    furosemide (LASIX) 20 MG tablet, Take 20 mg  by mouth daily as needed., Disp: , Rfl:     gabapentin (NEURONTIN) 300 MG capsule, Take 1 capsule (300 mg total) by mouth 3 (three) times daily., Disp: 90 capsule, Rfl: 1    HYDROcodone-acetaminophen (NORCO) 5-325 mg per tablet, Take 1 tablet by mouth every 6 (six) hours as needed for Pain., Disp: , Rfl:     hydrOXYzine HCL (ATARAX) 25 MG tablet, Take 1 tablet (25 mg total) by mouth every evening., Disp: 30 tablet, Rfl: 2    ibuprofen (ADVIL,MOTRIN) 800 MG tablet, Take 1 tablet (800 mg total) by mouth 3 (three) times daily., Disp: 60 tablet, Rfl: 0    ipratropium (ATROVENT) 42 mcg (0.06 %) nasal spray, USE 2 SPRAYS INTO EACH NOSTRIL FOUR TIMES DAILY, Disp: 15 mL, Rfl: 11    isosorbide mononitrate (IMDUR) 60 MG 24 hr tablet, Take 1 tablet (60 mg total) by mouth every evening., Disp: 90 tablet, Rfl: 1    ketoconazole (NIZORAL) 2 % cream, Apply topically 2 (two) times daily. For dry flaky patches of ear., Disp: 30 g, Rfl: 1    latanoprost 0.005 % ophthalmic solution, Place 1 drop into both eyes every evening., Disp: 2.5 mL, Rfl: 12    levETIRAcetam (KEPPRA) 500 MG Tab, Take 1 tablet (500 mg total) by mouth 2 (two) times daily., Disp: 180 tablet, Rfl: 3    linezolid (ZYVOX) 600 mg Tab, Take 1 tablet (600 mg total) by mouth every 12 (twelve) hours., Disp: 14 tablet, Rfl: 0    magnesium oxide (MAG-OX) 400 mg (241.3 mg magnesium) tablet, Take 1 tablet (400 mg total) by mouth once daily., Disp: 90 tablet, Rfl: 1    metoprolol tartrate (LOPRESSOR) 100 MG tablet, Take 100 mg by mouth 2 (two) times daily., Disp: , Rfl:     montelukast (SINGULAIR) 10 mg tablet, Take 1 tablet (10 mg total) by mouth once daily., Disp: 90 tablet, Rfl: 3    omeprazole (PRILOSEC) 40 MG capsule, Take 40 mg by mouth every morning., Disp: , Rfl:     oteseconazole 150 mg Cap, Take 150 mg by mouth every 72 hours. X 3 doses, Disp: 3 capsule, Rfl: 1    pantoprazole (PROTONIX) 40 MG tablet, Take 40 mg by mouth 2 (two) times daily., Disp: , Rfl:      potassium chloride SA (K-DUR,KLOR-CON) 20 MEQ tablet, Take 1 tablet (20 mEq total) by mouth once daily. Take extra dose with Lasix - fluid pill, Disp: 90 tablet, Rfl: 1    predniSONE (DELTASONE) 10 MG tablet, 2 tabs x 10 days. 1 daily x 20 days., Disp: 40 tablet, Rfl: 0    promethazine-dextromethorphan (PROMETHAZINE-DM) 6.25-15 mg/5 mL Syrp, Take 5 mLs by mouth every 6 (six) hours as needed (cough)., Disp: 240 mL, Rfl: 0    ranolazine (RANEXA) 1,000 mg Tb12, TAKE 1 TABLET BY MOUTH TWICE DAILY, Disp: 180 tablet, Rfl: 3    roflumilast (DALIRESP) 500 mcg Tab, TAKE ONE TABLET BY MOUTH DAILY, Disp: 90 tablet, Rfl: 3    sodium chloride 3% 3 % nebulizer solution, Take 4 mLs by nebulization as needed for Cough (chest congestion)., Disp: 360 mL, Rfl: 11    tiZANidine (ZANAFLEX) 4 MG tablet, Take 4 mg by mouth every 8 (eight) hours., Disp: , Rfl:     triamcinolone acetonide 0.025% (KENALOG) 0.025 % cream, Apply topically 2 (two) times daily. PRN rash and itching of ear. Mild steroid cream., Disp: 30 g, Rfl: 0    MYRBETRIQ 50 mg Tb24, TAKE 1 TABLET BY MOUTH ONCE DAILY, Disp: 30 tablet, Rfl: 0    nitroGLYCERIN (NITROSTAT) 0.4 MG SL tablet, DISSOLVE 1 TABLET UNDER TONGUE EVERY 5 MINUTES FOR CHEST PAIN (MAY TAKE UP TO 3 DOSES THEN SEEK MEDICAL ATTENTION), Disp: 25 tablet, Rfl: 0    ondansetron (ZOFRAN-ODT) 4 MG TbDL, Take 1 tablet (4 mg total) by mouth every 6 (six) hours as needed., Disp: 30 tablet, Rfl: 0     Objective:     Vitals:    03/28/24 1100   BP: (!) 145/76   Pulse: 77     Physical Exam   Eyes: Conjunctivae are normal.   Pulmonary/Chest: Effort normal. No respiratory distress.   Musculoskeletal:         General: Swelling and tenderness present.   Neurological: She displays no weakness.   Skin: No rash noted.       Reviewed available old and all outside pertinent medical records available.    All lab results personally reviewed and interpreted by me.       ASSESSMENT / PLAN     1. Pigmented villonodular synovitis   Hemorrhagic effusion.  No erosive changes on XR.  Negative RA serologies.  Normal uric acid, no benefit of ULT unless hyperuricemia or presence of crystals.   Follow with orthopedics, consider synovial biopsy.      2. Pain in wrist, unspecified laterality  Cyclic Citrullinated Peptide Antibody, IgG    Rheumatoid Factor    C-Reactive Protein    Uric Acid      3. Tenosynovial giant cell tumor  MRI Wrist Joint W WO Contrast Right: cannot be performed due to hx of brain stent/coil   No clinical data supporting DMARD or biologic therapy  Hem/Onc evaluation      4. Age-related osteoporosis without current pathological fracture  Prolia q6 months.  CMP within 2 weeks of injections.      5. Medication monitoring encounter  No hypocalcemia  CMP                  Julien Hernandez M.D.

## 2024-04-01 DIAGNOSIS — L29.9 PRURITUS: ICD-10-CM

## 2024-04-02 RX ORDER — HYDROXYZINE HYDROCHLORIDE 25 MG/1
25 TABLET, FILM COATED ORAL NIGHTLY
Qty: 30 TABLET | Refills: 2 | Status: SHIPPED | OUTPATIENT
Start: 2024-04-02

## 2024-04-03 ENCOUNTER — PATIENT MESSAGE (OUTPATIENT)
Dept: PULMONOLOGY | Facility: CLINIC | Age: 79
End: 2024-04-03
Payer: MEDICARE

## 2024-04-03 ENCOUNTER — TELEPHONE (OUTPATIENT)
Dept: RHEUMATOLOGY | Facility: CLINIC | Age: 79
End: 2024-04-03
Payer: MEDICARE

## 2024-04-03 NOTE — TELEPHONE ENCOUNTER
----- Message from Vonda Harris sent at 4/3/2024 11:35 AM CDT -----  States she is calling regarding a referral, for someone Dr Hernandez wanted her to see. Please call pt [phone]. Thank you

## 2024-04-04 ENCOUNTER — TELEPHONE (OUTPATIENT)
Dept: PULMONOLOGY | Facility: CLINIC | Age: 79
End: 2024-04-04
Payer: MEDICARE

## 2024-04-04 NOTE — TELEPHONE ENCOUNTER
Attempted to call pharmacy regarding medication pharmacy kept me on hold for 30 mins so I hung up, I will try again later.

## 2024-04-04 NOTE — TELEPHONE ENCOUNTER
----- Message from Shanelle Maguire sent at 4/4/2024  9:04 AM CDT -----  Contact: Sharonda  .Type:  Pharmacy Calling to Clarify an RX    Name of Caller: Sharonda  [facility] Name: [facility]   Prescription Name: oteseconazole  What do they need to clarify?: directions   Best Call Back Number: [phone]  Additional Information:

## 2024-04-05 ENCOUNTER — HOSPITAL ENCOUNTER (OUTPATIENT)
Dept: RADIOLOGY | Facility: HOSPITAL | Age: 79
Discharge: HOME | End: 2024-04-05
Attending: NURSE PRACTITIONER
Payer: MEDICARE

## 2024-04-05 ENCOUNTER — TELEPHONE (OUTPATIENT)
Dept: FAMILY MEDICINE | Facility: CLINIC | Age: 79
End: 2024-04-05
Payer: MEDICARE

## 2024-04-05 ENCOUNTER — OFFICE VISIT (OUTPATIENT)
Dept: PRIMARY CARE CLINIC | Facility: CLINIC | Age: 79
End: 2024-04-05
Payer: MEDICARE

## 2024-04-05 VITALS
TEMPERATURE: 99 F | HEART RATE: 75 BPM | DIASTOLIC BLOOD PRESSURE: 82 MMHG | WEIGHT: 112.75 LBS | HEIGHT: 64 IN | BODY MASS INDEX: 19.25 KG/M2 | OXYGEN SATURATION: 99 % | SYSTOLIC BLOOD PRESSURE: 154 MMHG

## 2024-04-05 DIAGNOSIS — R05.1 ACUTE COUGH: ICD-10-CM

## 2024-04-05 DIAGNOSIS — R05.1 ACUTE COUGH: Primary | ICD-10-CM

## 2024-04-05 DIAGNOSIS — J06.9 BACTERIAL UPPER RESPIRATORY INFECTION: ICD-10-CM

## 2024-04-05 DIAGNOSIS — B96.89 BACTERIAL UPPER RESPIRATORY INFECTION: ICD-10-CM

## 2024-04-05 PROCEDURE — 99213 OFFICE O/P EST LOW 20 MIN: CPT | Mod: S$GLB,,, | Performed by: NURSE PRACTITIONER

## 2024-04-05 PROCEDURE — 71046 X-RAY EXAM CHEST 2 VIEWS: CPT | Mod: TC,PO

## 2024-04-05 PROCEDURE — 71046 X-RAY EXAM CHEST 2 VIEWS: CPT | Mod: 26,,, | Performed by: RADIOLOGY

## 2024-04-05 PROCEDURE — 1101F PT FALLS ASSESS-DOCD LE1/YR: CPT | Mod: CPTII,S$GLB,, | Performed by: NURSE PRACTITIONER

## 2024-04-05 PROCEDURE — 3079F DIAST BP 80-89 MM HG: CPT | Mod: CPTII,S$GLB,, | Performed by: NURSE PRACTITIONER

## 2024-04-05 PROCEDURE — 3072F LOW RISK FOR RETINOPATHY: CPT | Mod: CPTII,S$GLB,, | Performed by: NURSE PRACTITIONER

## 2024-04-05 PROCEDURE — 3077F SYST BP >= 140 MM HG: CPT | Mod: CPTII,S$GLB,, | Performed by: NURSE PRACTITIONER

## 2024-04-05 PROCEDURE — 1159F MED LIST DOCD IN RCRD: CPT | Mod: CPTII,S$GLB,, | Performed by: NURSE PRACTITIONER

## 2024-04-05 PROCEDURE — 99999 PR PBB SHADOW E&M-EST. PATIENT-LVL V: CPT | Mod: PBBFAC,,, | Performed by: NURSE PRACTITIONER

## 2024-04-05 PROCEDURE — 3288F FALL RISK ASSESSMENT DOCD: CPT | Mod: CPTII,S$GLB,, | Performed by: NURSE PRACTITIONER

## 2024-04-05 PROCEDURE — 1126F AMNT PAIN NOTED NONE PRSNT: CPT | Mod: CPTII,S$GLB,, | Performed by: NURSE PRACTITIONER

## 2024-04-05 RX ORDER — PROMETHAZINE HYDROCHLORIDE AND DEXTROMETHORPHAN HYDROBROMIDE 6.25; 15 MG/5ML; MG/5ML
5 SYRUP ORAL EVERY 6 HOURS PRN
Qty: 180 ML | Refills: 0 | Status: SHIPPED | OUTPATIENT
Start: 2024-04-05 | End: 2024-04-15

## 2024-04-05 RX ORDER — AZITHROMYCIN 500 MG/1
500 TABLET, FILM COATED ORAL DAILY
Qty: 10 TABLET | Refills: 0 | Status: SHIPPED | OUTPATIENT
Start: 2024-04-05 | End: 2024-04-15

## 2024-04-05 RX ORDER — PREDNISONE 10 MG/1
10 TABLET ORAL DAILY
Qty: 30 TABLET | Refills: 0 | Status: SHIPPED | OUTPATIENT
Start: 2024-04-05

## 2024-04-05 NOTE — TELEPHONE ENCOUNTER
----- Message from Donna Manuel sent at 4/5/2024  2:55 PM CDT -----  Regarding: concerns /rx  Name of who is calling:  Shireen       What is the request in detail: Pt is requesting a call back in ref to rx antibiotics after visit today/[facility] - [place]  [address] [facility]  Phone: [phone]  Fax: [phone]          Can the clinic reply by [facility]:no      What number to call back if not [facility]: [phone]

## 2024-04-05 NOTE — TELEPHONE ENCOUNTER
POC reviewed with patient. Patient AAOx4 and denies any pain. COVID precautions maintained. IV and oral antibiotics administered. Tele monitor in place reading NSR. No red alarms or ectopy noted. Patient on 15L nonrebreather. Continuous pulse ox in place. Bed alarm on, bed locked and low, side rails up x2, and call bell in reach. Plan is for patient to be discharged to SNF pending clinical improvement.  Patient verbalizes clear understanding of POC.     Spoke with pharmacy, stated that they must have called by accident.

## 2024-04-05 NOTE — PROGRESS NOTES
Assessment/Plan:    Problem List Items Addressed This Visit    None  Visit Diagnoses       Acute cough    -  Primary    Relevant Orders    X-Ray Chest PA And Lateral    Bacterial upper respiratory infection        Relevant Medications    azithromycin (ZITHROMAX) 500 MG tablet    predniSONE (DELTASONE) 10 MG tablet    promethazine-dextromethorphan (PROMETHAZINE-DM) 6.25-15 mg/5 mL Syrp          We will follow chest x-ray results.  Follow up if symptoms worsen or fail to improve.    Day Rodriguez, MC  _____________________________________________________________________________________________________________________________________________________    CC:  Cough    HPI:    Patient is in clinic today as an established patient.    Cough: Patient states she started to feel bad about a week ago.  Cough started Monday and is getting worse.Patient complains of bilateral ear congestion, nasal congestion, and nonproductive cough. The cough is non-productive, chest is painful during coughing, worsening over time and is aggravated by deep breaths. Associated symptoms include:chills, fever, and shortness of breath. Patient does have a history of smoking. Patient  does not have recent previous Chest X-ray.  No other new complaints today.  Remaining chronic conditions have been reviewed and remain stable. Further detail as stated above.      reviewed.     No recent changes to medical/surgical history.    Current Outpatient Medications on File Prior to Visit   Medication Sig Dispense Refill    albuterol (PROVENTIL) 2.5 mg /3 mL (0.083 %) nebulizer solution EMPTY 1 VIAL INTO NEBULIZER EVERY 4 HOURS AS NEEDED FRO RESCUE 180 mL 11    albuterol (PROVENTIL/VENTOLIN HFA) 90 mcg/actuation inhaler INHALE TWO PUFFS INTO THE LUNGS EVERY 4 HOURS AS NEEDED 18 g 11    APTIOM 400 mg Tab tablet Take 400 mg by mouth.      atorvastatin (LIPITOR) 40 MG tablet TAKE 1 TABLET BY MOUTH ONCE DAILY 90 tablet 1    carvediloL (COREG) 6.25 MG tablet Take  1 tablet (6.25 mg total) by mouth 2 (two) times daily with meals. 180 tablet 1    cholecalciferol, vitamin D3, 1,250 mcg (50,000 unit) capsule Take 1 capsule (50,000 Units total) by mouth every 7 days. 12 capsule 0    cholestyramine (QUESTRAN) 4 gram packet TAKE 1 PACKET BY MOUTH TWICE DAILY AS DIRECTED 60 packet 11    clopidogreL (PLAVIX) 75 mg tablet Take 1 tablet (75 mg total) by mouth once daily. 90 tablet 3    denosumab (PROLIA) 60 mg/mL Syrg every 6 (six) months.      dicyclomine (BENTYL) 10 MG capsule Take 1 capsule (10 mg total) by mouth 2 (two) times daily. 10 capsule 0    diltiaZEM (CARDIZEM CD) 120 MG Cp24 Take 2 capsules (240 mg total) by mouth once daily. 180 capsule 1    dorzolamide-timolol 2-0.5% (COSOPT) 22.3-6.8 mg/mL ophthalmic solution Place 1 drop into both eyes every 12 (twelve) hours. 10 mL 12    EScitalopram oxalate (LEXAPRO) 20 MG tablet TAKE 1 TABLET BY MOUTH ONCE DAILY 90 tablet 2    fluocinonide (LIDEX) 0.05 % external solution Apply topically 2 (two) times daily. Use on itchy spots on scalp and ear 60 mL 2    fluticasone propionate (FLONASE) 50 mcg/actuation nasal spray USE 2 SPRAYS INTO EACH NOSTRIL ONCE A DAY AT 6AM AS DIRECTED 16 g 11    fluticasone-umeclidin-vilanter (TRELEGY ELLIPTA) 200-62.5-25 mcg inhaler INHALE 1 PUFF INTO THE LUNGS DAILY 60 each 11    furosemide (LASIX) 20 MG tablet Take 20 mg by mouth daily as needed.      gabapentin (NEURONTIN) 300 MG capsule Take 1 capsule (300 mg total) by mouth 3 (three) times daily. 90 capsule 1    hydrOXYzine HCL (ATARAX) 25 MG tablet TAKE 1 TABLET BY MOUTH EVERY EVENING 30 tablet 2    ibuprofen (ADVIL,MOTRIN) 800 MG tablet Take 1 tablet (800 mg total) by mouth 3 (three) times daily. 60 tablet 0    ipratropium (ATROVENT) 42 mcg (0.06 %) nasal spray USE 2 SPRAYS INTO EACH NOSTRIL FOUR TIMES DAILY 15 mL 11    isosorbide mononitrate (IMDUR) 60 MG 24 hr tablet Take 1 tablet (60 mg total) by mouth every evening. 90 tablet 1    ketoconazole  (NIZORAL) 2 % cream Apply topically 2 (two) times daily. For dry flaky patches of ear. 30 g 1    latanoprost 0.005 % ophthalmic solution Place 1 drop into both eyes every evening. 2.5 mL 12    levETIRAcetam (KEPPRA) 500 MG Tab Take 1 tablet (500 mg total) by mouth 2 (two) times daily. 180 tablet 3    linezolid (ZYVOX) 600 mg Tab Take 1 tablet (600 mg total) by mouth every 12 (twelve) hours. 14 tablet 0    magnesium oxide (MAG-OX) 400 mg (241.3 mg magnesium) tablet Take 1 tablet (400 mg total) by mouth once daily. 90 tablet 1    metoprolol tartrate (LOPRESSOR) 100 MG tablet Take 100 mg by mouth 2 (two) times daily.      montelukast (SINGULAIR) 10 mg tablet Take 1 tablet (10 mg total) by mouth once daily. 90 tablet 3    nitroGLYCERIN (NITROSTAT) 0.4 MG SL tablet DISSOLVE 1 TABLET UNDER TONGUE EVERY 5 MINUTES FOR CHEST PAIN (MAY TAKE UP TO 3 DOSES THEN SEEK MEDICAL ATTENTION) 25 tablet 0    omeprazole (PRILOSEC) 40 MG capsule Take 40 mg by mouth every morning.      ondansetron (ZOFRAN-ODT) 4 MG TbDL Take 1 tablet (4 mg total) by mouth every 6 (six) hours as needed. 30 tablet 0    oteseconazole 150 mg Cap Take 150 mg by mouth every 72 hours. X 3 doses 3 capsule 1    pantoprazole (PROTONIX) 40 MG tablet Take 40 mg by mouth 2 (two) times daily.      potassium chloride SA (K-DUR,KLOR-CON) 20 MEQ tablet Take 1 tablet (20 mEq total) by mouth once daily. Take extra dose with Lasix - fluid pill 90 tablet 1    ranolazine (RANEXA) 1,000 mg Tb12 TAKE 1 TABLET BY MOUTH TWICE DAILY 180 tablet 3    roflumilast (DALIRESP) 500 mcg Tab TAKE ONE TABLET BY MOUTH DAILY 90 tablet 3    sodium chloride 3% 3 % nebulizer solution Take 4 mLs by nebulization as needed for Cough (chest congestion). 360 mL 11    tiZANidine (ZANAFLEX) 4 MG tablet Take 4 mg by mouth every 8 (eight) hours.      triamcinolone acetonide 0.025% (KENALOG) 0.025 % cream Apply topically 2 (two) times daily. PRN rash and itching of ear. Mild steroid cream. 30 g 0     "[DISCONTINUED] promethazine-dextromethorphan (PROMETHAZINE-DM) 6.25-15 mg/5 mL Syrp Take 5 mLs by mouth every 6 (six) hours as needed (cough). 240 mL 0    carvediloL (COREG) 25 MG tablet Take 25 mg by mouth 2 (two) times daily. (Patient not taking: Reported on 4/5/2024)      HYDROcodone-acetaminophen (NORCO) 5-325 mg per tablet Take 1 tablet by mouth every 6 (six) hours as needed for Pain. (Patient not taking: Reported on 4/5/2024)      MYRBETRIQ 50 mg Tb24 TAKE 1 TABLET BY MOUTH ONCE DAILY 30 tablet 0    [DISCONTINUED] predniSONE (DELTASONE) 10 MG tablet 2 tabs x 10 days. 1 daily x 20 days. (Patient not taking: Reported on 4/5/2024) 40 tablet 0     No current facility-administered medications on file prior to visit.       Review of Systems   Constitutional:  Positive for chills.   HENT:  Positive for congestion.    Eyes: Negative.    Respiratory:  Positive for cough.    Cardiovascular: Negative.    Gastrointestinal: Negative.    Endocrine: Negative.    Genitourinary: Negative.    Musculoskeletal:  Positive for back pain (& chest pain from coughing).   Skin: Negative.    Allergic/Immunologic: Negative.    Neurological: Negative.    Hematological: Negative.    Psychiatric/Behavioral: Negative.         Vitals:    04/05/24 1136   BP: (!) 154/82   Pulse: 75   Temp: 98.6 °F (37 °C)   SpO2: 99%   Weight: 51.1 kg (112 lb 12.2 oz)   Height: 5' 4" (1.626 m)       Wt Readings from Last 3 Encounters:   04/05/24 51.1 kg (112 lb 12.2 oz)   03/28/24 51 kg (112 lb 7 oz)   03/26/24 51 kg (112 lb 7 oz)       Physical Exam  Vitals and nursing note reviewed.   Constitutional:       Appearance: She is well-developed.   HENT:      Head: Normocephalic and atraumatic.      Right Ear: Tympanic membrane normal.      Left Ear: Tympanic membrane normal.      Mouth/Throat:      Pharynx: Posterior oropharyngeal erythema present.   Eyes:      General:         Right eye: Discharge (watery discharge) present.         Left eye: Discharge (watery " discharge) present.     Conjunctiva/sclera: Conjunctivae normal.      Pupils: Pupils are equal, round, and reactive to light.   Cardiovascular:      Rate and Rhythm: Normal rate and regular rhythm.      Heart sounds: Normal heart sounds.   Pulmonary:      Breath sounds: Examination of the right-lower field reveals wheezing and rhonchi. Examination of the left-lower field reveals wheezing and rhonchi. Wheezing and rhonchi present.   Abdominal:      General: Bowel sounds are normal.      Palpations: Abdomen is soft.   Musculoskeletal:         General: Normal range of motion.      Cervical back: Normal range of motion and neck supple.   Skin:     General: Skin is warm and dry.   Neurological:      Mental Status: She is alert and oriented to person, place, and time.   Psychiatric:         Behavior: Behavior normal.         Thought Content: Thought content normal.         Judgment: Judgment normal.         Health Maintenance   Topic Date Due    DEXA Scan  01/28/2023    Hemoglobin A1c  02/29/2024    Eye Exam  07/27/2024    Lipid Panel  08/31/2024    TETANUS VACCINE  11/03/2031    Hepatitis C Screening  Completed    Shingles Vaccine  Completed

## 2024-04-08 ENCOUNTER — TELEPHONE (OUTPATIENT)
Dept: PRIMARY CARE CLINIC | Facility: CLINIC | Age: 79
End: 2024-04-08
Payer: MEDICARE

## 2024-04-08 ENCOUNTER — TELEPHONE (OUTPATIENT)
Dept: PULMONOLOGY | Facility: CLINIC | Age: 79
End: 2024-04-08
Payer: MEDICARE

## 2024-04-08 DIAGNOSIS — T36.95XA ANTIBIOTIC-INDUCED YEAST INFECTION: Primary | ICD-10-CM

## 2024-04-08 DIAGNOSIS — B37.9 ANTIBIOTIC-INDUCED YEAST INFECTION: Primary | ICD-10-CM

## 2024-04-08 RX ORDER — FLUCONAZOLE 150 MG/1
150 TABLET ORAL DAILY
Qty: 2 TABLET | Refills: 0 | Status: SHIPPED | OUTPATIENT
Start: 2024-04-08

## 2024-04-08 NOTE — TELEPHONE ENCOUNTER
----- Message from Rosalie Ching MA sent at 4/2/2024  3:30 PM CDT -----  Regarding: FW: Jose L/  Contact: Jose L/Asaf  Spoke with pharmacist. She called for clarification of medication oteseconazole 150mg cap. She stated that, that medication isn't usually written the way you prescribed it or if you maybe you meant to send in another medication. Please advise.   ----- Message -----  From: Chey Stone  Sent: 4/2/2024   2:10 PM CDT  To: Sra[facility] SMITH Staff  Subject: Jose L/                                           Jose L is calling in regards to the direction on oteseconazole 150 mg Cap.please call back at [phone]              Thanks  AVE

## 2024-04-08 NOTE — TELEPHONE ENCOUNTER
----- Message from Freddy House sent at 4/8/2024 11:47 AM CDT -----  Type: Patient Call Back    Who called:self    What is the request in detail:In results, and questions about medication     Can the clinic reply by [facility]?No    Would the patient rather a call back or a response via [facility]? Call    Best call back number:[phone] (home)       Additional Information:

## 2024-04-15 ENCOUNTER — TELEPHONE (OUTPATIENT)
Dept: PULMONOLOGY | Facility: CLINIC | Age: 79
End: 2024-04-15
Payer: MEDICARE

## 2024-04-15 ENCOUNTER — OFFICE VISIT (OUTPATIENT)
Dept: PULMONOLOGY | Facility: CLINIC | Age: 79
End: 2024-04-15
Payer: MEDICARE

## 2024-04-15 ENCOUNTER — HOSPITAL ENCOUNTER (OUTPATIENT)
Dept: RADIOLOGY | Facility: HOSPITAL | Age: 79
Discharge: HOME | End: 2024-04-15
Attending: HOSPITALIST
Payer: MEDICARE

## 2024-04-15 VITALS
SYSTOLIC BLOOD PRESSURE: 121 MMHG | RESPIRATION RATE: 20 BRPM | HEIGHT: 64 IN | WEIGHT: 113.56 LBS | OXYGEN SATURATION: 99 % | HEART RATE: 83 BPM | TEMPERATURE: 98 F | BODY MASS INDEX: 19.39 KG/M2 | DIASTOLIC BLOOD PRESSURE: 80 MMHG

## 2024-04-15 DIAGNOSIS — R06.02 SOB (SHORTNESS OF BREATH) ON EXERTION: ICD-10-CM

## 2024-04-15 DIAGNOSIS — R91.8 MULTIPLE PULMONARY NODULES: ICD-10-CM

## 2024-04-15 DIAGNOSIS — R05.3 CHRONIC COUGH: ICD-10-CM

## 2024-04-15 DIAGNOSIS — F51.01 PRIMARY INSOMNIA: ICD-10-CM

## 2024-04-15 DIAGNOSIS — J41.1 MUCOPURULENT CHRONIC BRONCHITIS: Chronic | ICD-10-CM

## 2024-04-15 DIAGNOSIS — J84.10 PULMONARY INTERSTITIAL FIBROSIS: ICD-10-CM

## 2024-04-15 DIAGNOSIS — R05.9 COUGH, UNSPECIFIED TYPE: ICD-10-CM

## 2024-04-15 DIAGNOSIS — J44.1 COPD, FREQUENT EXACERBATIONS: Primary | ICD-10-CM

## 2024-04-15 DIAGNOSIS — J44.89 ASTHMA WITH COPD: ICD-10-CM

## 2024-04-15 DIAGNOSIS — I50.32 CHRONIC DIASTOLIC HEART FAILURE: ICD-10-CM

## 2024-04-15 DIAGNOSIS — R06.83 PRIMARY SNORING: ICD-10-CM

## 2024-04-15 DIAGNOSIS — J45.40 MODERATE PERSISTENT ASTHMA WITHOUT COMPLICATION: ICD-10-CM

## 2024-04-15 PROCEDURE — 1160F RVW MEDS BY RX/DR IN RCRD: CPT | Mod: CPTII,S$GLB,, | Performed by: NURSE PRACTITIONER

## 2024-04-15 PROCEDURE — 3079F DIAST BP 80-89 MM HG: CPT | Mod: CPTII,S$GLB,, | Performed by: NURSE PRACTITIONER

## 2024-04-15 PROCEDURE — 1101F PT FALLS ASSESS-DOCD LE1/YR: CPT | Mod: CPTII,S$GLB,, | Performed by: NURSE PRACTITIONER

## 2024-04-15 PROCEDURE — 71046 X-RAY EXAM CHEST 2 VIEWS: CPT | Mod: TC

## 2024-04-15 PROCEDURE — 3074F SYST BP LT 130 MM HG: CPT | Mod: CPTII,S$GLB,, | Performed by: NURSE PRACTITIONER

## 2024-04-15 PROCEDURE — 3288F FALL RISK ASSESSMENT DOCD: CPT | Mod: CPTII,S$GLB,, | Performed by: NURSE PRACTITIONER

## 2024-04-15 PROCEDURE — 99214 OFFICE O/P EST MOD 30 MIN: CPT | Mod: S$GLB,,, | Performed by: NURSE PRACTITIONER

## 2024-04-15 PROCEDURE — 99999 PR PBB SHADOW E&M-EST. PATIENT-LVL V: CPT | Mod: PBBFAC,,, | Performed by: NURSE PRACTITIONER

## 2024-04-15 PROCEDURE — 3072F LOW RISK FOR RETINOPATHY: CPT | Mod: CPTII,S$GLB,, | Performed by: NURSE PRACTITIONER

## 2024-04-15 PROCEDURE — 1159F MED LIST DOCD IN RCRD: CPT | Mod: CPTII,S$GLB,, | Performed by: NURSE PRACTITIONER

## 2024-04-15 PROCEDURE — 71046 X-RAY EXAM CHEST 2 VIEWS: CPT | Mod: 26,,, | Performed by: RADIOLOGY

## 2024-04-15 NOTE — ASSESSMENT & PLAN NOTE
Stable. 4/15/2024 chest xray stable interstitial findings, no acute changes  5/16/2023 chest xray stable ILD.  5/16/2023 complete PFT Spirometry is normal. Spirometry remains unimproved following bronchodilator. Lung volume determination is normal. Airway mechanics show normal airway resistance and conductance. DLCO is mildly decreased. MVV is mildly decreased.    4/26/2023 6MWD 100% SaO2 duration of walk.   4/26/2023 CT chest mild amount of honeycombing in the peripheral portion of both lungs  Follow up 1 year repeat CT chest

## 2024-04-15 NOTE — ASSESSMENT & PLAN NOTE
4/5/2024 Zithromax 500 mg, Prednisone 20 mg x 10 days, then 10 mg x 20 days.      4/15/2024 chest xray chronic interstitial findings. No acute infiltrates.     Current maintenance treatment : Trelegy 200 mcg, Albuterol inhaler. Albuterol nebs. 3% NACL nebs 2-3 times a day, begin with twice daily progress to 3Xdaily if tolerated.     4/5/2024 began Prednisone 30 day taper, complete  4/5/2024 Add on: AirSupra Albuterol/budesonide inhaler, continue current maintenance treatment, maximize 3% nebs 3-4 times a day.   Attempt home collect sputums gram stain and afb   Follow up 5 weeks spirometry, feno, CT chest. Follow up sooner if not gradual improvement with treatment

## 2024-04-15 NOTE — TELEPHONE ENCOUNTER
Returned patient call and got her scheduled for an cxr at 10 ----- Message from Anny Lofton sent at 4/15/2024  7:30 AM CDT -----  Contact: self [phone]  Patient called in this morning  and scheduled an appointment, she would like orders put in for a chest xray. Please call back [phone]. Thanks tpra

## 2024-04-15 NOTE — ASSESSMENT & PLAN NOTE
Acute flaure  4/5/2024 prescribed Zithromax 500 mg, Prednisone 20 mg x 10 days, then 10 mg x 20 days.    4/15/2024 chest xray chronic interstitial findings. No acute infiltrates.   Current maintenance treatment: Trelegy 200 mcg, Albuterol inhaler. Albuterol nebs. 3% NACL nebs 2-3 times a day, begin with twice daily progress to 3Xdaily if tolerated.   (See receipt per discharge instructions).   4/5/2024 began Prednisone 30 day taper, complete  4/5/2024 Add on: AirSupra Albuterol/budesonide inhaler, continue current maintenance treatment, maximize 3% nebs 3-4 times a day.   Attempt home collect sputums gram stain and afb

## 2024-04-15 NOTE — TELEPHONE ENCOUNTER
Returned patient call and let her know that I had sent the PA in and would get back to her when it was answered.----- Message from Elizabeth Scruggs sent at 4/15/2024  1:27 PM CDT -----  Contact: self   Patient is calling to let Dr Fishman know that her Rx for albuterol-budesonide (AIRSUPRA) 90-80 mcg/actuation will need a PA. Please call to advise

## 2024-04-16 ENCOUNTER — TELEPHONE (OUTPATIENT)
Dept: PULMONOLOGY | Facility: CLINIC | Age: 79
End: 2024-04-16
Payer: MEDICARE

## 2024-04-16 ENCOUNTER — LAB VISIT (OUTPATIENT)
Dept: LAB | Facility: HOSPITAL | Age: 79
End: 2024-04-16
Attending: NURSE PRACTITIONER
Payer: MEDICARE

## 2024-04-16 DIAGNOSIS — J41.1 MUCOPURULENT CHRONIC BRONCHITIS: Chronic | ICD-10-CM

## 2024-04-16 DIAGNOSIS — R05.3 CHRONIC COUGH: ICD-10-CM

## 2024-04-16 DIAGNOSIS — J44.1 COPD, FREQUENT EXACERBATIONS: ICD-10-CM

## 2024-04-16 PROCEDURE — 87205 SMEAR GRAM STAIN: CPT | Performed by: NURSE PRACTITIONER

## 2024-04-16 PROCEDURE — 87070 CULTURE OTHR SPECIMN AEROBIC: CPT | Performed by: NURSE PRACTITIONER

## 2024-04-16 PROCEDURE — 87116 MYCOBACTERIA CULTURE: CPT | Performed by: NURSE PRACTITIONER

## 2024-04-16 PROCEDURE — 87015 SPECIMEN INFECT AGNT CONCNTJ: CPT | Performed by: NURSE PRACTITIONER

## 2024-04-16 PROCEDURE — 87206 SMEAR FLUORESCENT/ACID STAI: CPT | Performed by: NURSE PRACTITIONER

## 2024-04-16 RX ORDER — ALBUTEROL SULFATE 90 UG/1
AEROSOL, METERED RESPIRATORY (INHALATION)
Qty: 18 G | Refills: 11 | Status: SHIPPED | OUTPATIENT
Start: 2024-04-16

## 2024-04-16 NOTE — TELEPHONE ENCOUNTER
Returned Ms. Iyer's call she stated she spoke to someone in office and they told her that she could send her lab specimen to the [facility] she only sent one bottle some she is going to send the other sometime today.----- Message from Magdalena Yu sent at 4/16/2024  9:58 AM CDT -----  .Type:  Needs Medical Advice    Who Called: pt    Would the patient rather a call back or a response via [facility]? Call back  Best Call Back Number: [phone]  Additional Information:     Pt would like a call back concerning her lab specimen drop off

## 2024-04-16 NOTE — TELEPHONE ENCOUNTER
Care Due:                  Date            Visit Type   Department     Provider  --------------------------------------------------------------------------------                                EP -                              PRIMARY      [facility]  Last Visit: 03-      CARE ([facility])   MEDICINE       Laron Chaudhari  Next Visit: None Scheduled  None         None Found                                                            Last  Test          Frequency    Reason                     Performed    Due Date  --------------------------------------------------------------------------------    Office Visit  12 months..  atorvastatin,              03- 02-                             clopidogreL, dicyclomine,                             montelukast..............    [employer] Embedded Care Due Messages. Reference number: [id number].   4/16/2024 3:40:43 PM CDT

## 2024-04-18 LAB
BACTERIA SPEC AEROBE CULT: NORMAL
BACTERIA SPEC AEROBE CULT: NORMAL
GRAM STN SPEC: NORMAL

## 2024-04-22 ENCOUNTER — OFFICE VISIT (OUTPATIENT)
Dept: PULMONOLOGY | Facility: CLINIC | Age: 79
End: 2024-04-22
Payer: MEDICARE

## 2024-04-22 ENCOUNTER — HOSPITAL ENCOUNTER (OUTPATIENT)
Dept: RADIOLOGY | Facility: HOSPITAL | Age: 79
Discharge: HOME | End: 2024-04-22
Attending: NURSE PRACTITIONER
Payer: MEDICARE

## 2024-04-22 ENCOUNTER — CLINICAL SUPPORT (OUTPATIENT)
Dept: PULMONOLOGY | Facility: CLINIC | Age: 79
End: 2024-04-22
Payer: MEDICARE

## 2024-04-22 VITALS
OXYGEN SATURATION: 99 % | HEART RATE: 88 BPM | DIASTOLIC BLOOD PRESSURE: 68 MMHG | RESPIRATION RATE: 16 BRPM | BODY MASS INDEX: 19.15 KG/M2 | HEIGHT: 64 IN | SYSTOLIC BLOOD PRESSURE: 128 MMHG | WEIGHT: 112.19 LBS

## 2024-04-22 DIAGNOSIS — J44.89 ASTHMA WITH COPD: ICD-10-CM

## 2024-04-22 DIAGNOSIS — I50.32 CHRONIC DIASTOLIC HEART FAILURE: ICD-10-CM

## 2024-04-22 DIAGNOSIS — R10.9 ACUTE RIGHT FLANK PAIN: ICD-10-CM

## 2024-04-22 DIAGNOSIS — R91.8 MULTIPLE PULMONARY NODULES: ICD-10-CM

## 2024-04-22 DIAGNOSIS — M54.6 THORACIC SPINE PAIN: ICD-10-CM

## 2024-04-22 DIAGNOSIS — J41.1 MUCOPURULENT CHRONIC BRONCHITIS: Primary | Chronic | ICD-10-CM

## 2024-04-22 DIAGNOSIS — J45.40 MODERATE PERSISTENT ASTHMA WITHOUT COMPLICATION: ICD-10-CM

## 2024-04-22 DIAGNOSIS — J44.1 COPD, FREQUENT EXACERBATIONS: ICD-10-CM

## 2024-04-22 DIAGNOSIS — R06.02 SOB (SHORTNESS OF BREATH): ICD-10-CM

## 2024-04-22 DIAGNOSIS — R05.3 CHRONIC COUGH: ICD-10-CM

## 2024-04-22 DIAGNOSIS — R05.2 SUBACUTE COUGH: ICD-10-CM

## 2024-04-22 DIAGNOSIS — M54.6 ACUTE RIGHT-SIDED THORACIC BACK PAIN: ICD-10-CM

## 2024-04-22 DIAGNOSIS — J84.10 PULMONARY INTERSTITIAL FIBROSIS: ICD-10-CM

## 2024-04-22 DIAGNOSIS — J84.9 INTERSTITIAL PULMONARY DISEASE, UNSPECIFIED: ICD-10-CM

## 2024-04-22 PROCEDURE — 3288F FALL RISK ASSESSMENT DOCD: CPT | Mod: CPTII,S$GLB,, | Performed by: NURSE PRACTITIONER

## 2024-04-22 PROCEDURE — 99999 PR PBB SHADOW E&M-EST. PATIENT-LVL V: CPT | Mod: PBBFAC,,, | Performed by: NURSE PRACTITIONER

## 2024-04-22 PROCEDURE — 99214 OFFICE O/P EST MOD 30 MIN: CPT | Mod: S$GLB,,, | Performed by: NURSE PRACTITIONER

## 2024-04-22 PROCEDURE — 71250 CT THORAX DX C-: CPT | Mod: 26,,, | Performed by: RADIOLOGY

## 2024-04-22 PROCEDURE — 72070 X-RAY EXAM THORAC SPINE 2VWS: CPT | Mod: TC

## 2024-04-22 PROCEDURE — 1160F RVW MEDS BY RX/DR IN RCRD: CPT | Mod: CPTII,S$GLB,, | Performed by: NURSE PRACTITIONER

## 2024-04-22 PROCEDURE — 3072F LOW RISK FOR RETINOPATHY: CPT | Mod: CPTII,S$GLB,, | Performed by: NURSE PRACTITIONER

## 2024-04-22 PROCEDURE — 3074F SYST BP LT 130 MM HG: CPT | Mod: CPTII,S$GLB,, | Performed by: NURSE PRACTITIONER

## 2024-04-22 PROCEDURE — 3078F DIAST BP <80 MM HG: CPT | Mod: CPTII,S$GLB,, | Performed by: NURSE PRACTITIONER

## 2024-04-22 PROCEDURE — 71250 CT THORAX DX C-: CPT | Mod: TC

## 2024-04-22 PROCEDURE — 1101F PT FALLS ASSESS-DOCD LE1/YR: CPT | Mod: CPTII,S$GLB,, | Performed by: NURSE PRACTITIONER

## 2024-04-22 PROCEDURE — 72070 X-RAY EXAM THORAC SPINE 2VWS: CPT | Mod: 26,,, | Performed by: RADIOLOGY

## 2024-04-22 PROCEDURE — 1159F MED LIST DOCD IN RCRD: CPT | Mod: CPTII,S$GLB,, | Performed by: NURSE PRACTITIONER

## 2024-04-22 NOTE — PROGRESS NOTES
Subjective:      Established patient    Patient ID: Shireen Felix is a 78 y.o. female.      Chief Complaint: COPD, Shortness of Breath, feeling tired, and Mid-back Pain      HPI   4/15/2024 Sravanthi BENTLEY  Shireen Felix here for acute care visit related to cough with COPD/bronchitis flare over past 3 weeks.   3/26/2024 prescribed Zyvox 600 mg  4/5/2024 prescribed Zithromax 500 mg, Prednisone 20 mg x 10 days, then 10 mg x 20 days.      4/15/2024 chest xray chronic interstitial findings. No acute infiltrates.     Current maintenance treatment: Trelegy 200 mcg, Albuterol inhaler. Albuterol nebs. 3% NACL nebs 2-3 times a day, begin with twice daily progress to 3Xdaily if tolerated.   (See receipt per discharge instructions).     4/5/2024 began Prednisone 30 day taper, complete  4/5/2024 Add on: AirSupra Albuterol/budesonide inhaler, continue current maintenance treatment, maximize 3% nebs 3-4 times a day.   Attempt home collect sputums gram stain and afb     5/4/2023 PSG no ryan AHI 2.9.  SpO2 was below 88% for 2.4 minutes.    1/3/2024 Sravanthi BENTLEY  Shireen Felix here for acute care visit related to cough with frequent COPD/Bronchitis exacerbations since November 25, 2023. With hospital visits, 11/25/23, 12/18/23,  12/27/23. And 12/22/23 primary care provider visit.  3 courses antibiotic last cefdinir. Patient has antibiotic allergies to PCN, Doxy, Sulfa.  She reports same continued cough since late November 2023 with continued chest congestion.  Current regimen: Trelegy 200 mcg. Albuterol inhaler. Albuterol nebs. Daliresp.   Begin Levaquin 500 mg x 10 days, prednisone 20 mg x 10 days, then 10 mg x 20 days.  3% NACL nebs 2-3 times a day, begin with twice daily progress to 3Xdaily if tolerated.   (See receipt per discharge instructions).   Continue Albuterol nebs twice daily.     1/3/2024 chest xray chronic ILD findings with scarring otherwise lungs clear.        5/16/2023 Sravanthi BENTLEY  Shireen Felix female  here for follow up on asthma review cpft/cxr/FeNO.     5/16/2023 CPFT Spirometry is normal. Lung volumes are normal. Mild reduced diffusing capacity. MVV is mildly decreased    5/16/2023 FeNO is 11, no inflammation of lungs.     5/16/2023 chest x-ray chronic interstitial findings, stable.    Current regimen: Trelegy 200 mcg. Albuterol inhaler. Albuterol nebs. Daliresp.     Patient reports stable no increased cough.  Areas complaint of chronic fatigue.  Her complaint of chronic fatigue pain.  Patient was worked up for sleep apnea no sleep apnea detected no significant hypoxemia seen during sleep.  She has complaint of noticing elevations in her blood pressure which she recently went to the emergency room on 05/13/2023.  She was noted to have mild congestive heart failure with elevated BNP and lower than normal sodium.  She has follow-up with Cardiology 5/17/2023.     4/26/2023 Sravanthi BENTLEY   Shireen Felix is a 78 y.o. female presents for follow up on asthma, SOB, Chronic UIP, chronic bronchitis, pulmonary nodules with review CT chest and 6MWD.  She also has been referred by neurology department for sleep apnea evaluation.     She has complaint fall yesterday with right rib pain. 4/26/2023 CT chest healing 7th and 8th rib fx, no pneumothorax.     She states her neurologist wanted her to have sleep study, Susan Contreras NP ordered sleep study on 4/12/2023 awaiting scheduling with sleep lab.     4/26/2023 CT Chest There are noncalcified pulmonary nodules in both lungs.  One of the larger ones measures 5 mm and is located in the right lower lobe.  On the prior examination it measured 6 mm.  There is a mild amount of honeycombing in the peripheral portion of both lungs.  There is no pneumothorax or pleural effusion.    10/26/2022 CPFT Normal spirometry. Normal lung volumes. Moderate reduction in diffusion capacity.     She is followed in pulmonary for chronic UIP, moderate persistent asthma with chronic bronchitis.      Patients reports stable NYHA II dyspnea     + covid 19 12/31/2021, 2/24/2022, July 2022.     The patient does not have currently have symptoms.     She is remains improved since on Trelegy 200 mcg      Her current respiratory therapy regimen is TRELEGY 200 mcg. VENTOLIN inhaler, albuterol nebs, Daliresp 500 mcg.  She is  adherent with her regimen.      42 pack year former smoker. Quit 2004.         Asthma Control Test  In the past 4  weeks, how much of the time did your asthma keep you from getting as much done at work, school or at home?: Most of the time  During the past 4 weeks, how often have you had shortness of breath?: More than once a day  During the past 4 weeks, how often did your asthma symptoms (wheezing, couging, shortness of breath, chest tightness or pain) wake you up at night or earlier than usual in the morning?: 4 or more nights a week  During the past 4 weeks, how often have you used your rescue inhaler or nebulizer medication (such as albuterol)?: 1 or 2 times per day  How would you rate your asthma control during the past 4 weeks?: Not controlled at all  If your score is 19 or less, your asthma may not be under control: 7      Immunization status reviewed and up to date.      Patient has been referred by Susan Contreras NP, Neurology for sleep apnea evaluation.   Sleep Apnea evaluation  Patient has been observed snoring with frequent awakening. History of seizures, stroke, cerebral aneurysm with repair.   Patient reports non restful' sleep.  She denies morning headache.   She reports day time napping.  Day time napping duration 15 Minutes  She denies recent weight gain.  Cardiovascular risk factors: hypertension  Bed time is 1000, 1200  Wake time is 0500  Sleep onset is within  1 Hour.  Sleep maintenance difficulties related to early morning awakening, frequent night time awakening, difficulty falling asleep, and non-restful sleep  Wake after sleep onset occurs five times a night.  Nocturia  occurs one time a night,   Sleep aids : Yes, melatonin  Dry mouth : No  Sleep walking: No  Sleep talking : No  Sleep eating:No  Vivid Dreams : No  Cataplexy : No    [place] Sleepiness Scale       4/26/2023    10:25 AM   EPWORTH SLEEPINESS SCALE   Sitting and reading 1   Watching TV 3   Sitting, inactive in a public place (e.g. a theatre or a meeting) 0   As a passenger in a car for an hour without a break 0   Lying down to rest in the afternoon when circumstances permit 2   Sitting and talking to someone 0   Sitting quietly after a lunch without alcohol 1   In a car, while stopped for a few minutes in traffic 0   Total score 7       Neck circumference is 13.  Mallampati score 2    STOP - BANG Questionnaire:   1. Snoring : Do you snore loudly ?     YES  2. Tired : Do you often feel tired, fatigued, or sleepy during daytime?   YES  3. Observed: Has anyone observed you stop breathing during your sleep?    NO  4. Blood pressure : Do you have or are you being treated for high blood pressure?    YES  5. BMI :BMI more than 35 kg/m2?   NO  6. Age : Age over 50 yr old?    YES  7. Neck circumference: Neck circumference greater than 40 cm?   NO  8. Gender: Gender male?   NO    SCORE: 4    High risk of MEENA: Yes 5 - 8  Intermediate risk of MEENA: Yes 3 - 4  Low risk of MEENA: Yes 0 - 2       A full  review of systems, past , family  and social histories was performed except as mentioned in the note above, these are non contributory to the main issues discussed today.     Previous Report Reviewed: lab reports, office notes and radiology reports     The following portions of the patient's history were reviewed and updated as appropriate: She  has a past medical history of Abnormal ECG (8/5/2019), Aneurysm, Anticoagulant long-term use, Arthritis, CAD in native artery (8/5/2019), COPD (chronic obstructive pulmonary disease), Diabetes mellitus, Fall (10/10/2019), Glaucoma, Hypertension, Seizures, Shingles (05/27/2017), and Stroke.  She   has a past surgical history that includes Brain surgery; Hysterectomy; Transforaminal epidural injection of steroid (Bilateral, 07/23/2019); Cardiac catheterization; Coronary angioplasty; Epidural steroid injection into lumbar spine (Bilateral, 11/12/2019); Injection of anesthetic agent around medial branch nerves innervating lumbar facet joint (Bilateral, 01/31/2020); Transforaminal epidural injection of steroid (Bilateral, 03/10/2020); Transforaminal epidural injection of steroid (Right, 06/19/2020); Injection of joint (Bilateral, 07/09/2020); Injection of anesthetic agent into sacroiliac joint (Right, 11/16/2021); Selective injection of anesthetic agent around lumbar spinal nerve root by transforaminal approach (Bilateral, 04/26/2023); Injection of joint (Bilateral, 07/28/2023); Injection of anesthetic agent into sacroiliac joint (Bilateral, 07/28/2023); Colonoscopy (N/A, 09/21/2023); Oophorectomy; Injection of joint (N/A, 10/25/2023); and Epidural steroid injection into cervical spine (N/A, 2/7/2024).  Her family history includes Breast cancer in her maternal aunt, maternal aunt, and maternal aunt; No Known Problems in her father and mother.  She  reports that she quit smoking about 20 years ago. Her smoking use included cigarettes. She started smoking about 62 years ago. She has a 42.3 pack-year smoking history. She has quit using smokeless tobacco. She reports that she does not drink alcohol and does not use drugs.  She has a current medication list which includes the following prescription(s): albuterol, albuterol, albuterol-budesonide, aptiom, atorvastatin, carvedilol, cholecalciferol (vitamin d3), cholestyramine, clopidogrel, denosumab, dicyclomine, diltiazem, dorzolamide-timolol 2-0.5%, escitalopram oxalate, fluconazole, fluocinonide, fluticasone propionate, trelegy ellipta, furosemide, gabapentin, hydrocodone-acetaminophen, hydroxyzine hcl, ibuprofen, ipratropium, isosorbide mononitrate, ketoconazole,  "latanoprost, levetiracetam, linezolid, magnesium oxide, metoprolol tartrate, montelukast, myrbetriq, nitroglycerin, omeprazole, ondansetron, pantoprazole, potassium chloride sa, prednisone, ranolazine, roflumilast, sodium chloride 3%, tizanidine, and triamcinolone acetonide 0.025%.  She is allergic to penicillins, adhesive, doxycycline, oxycodone, and sulfa (sulfonamide antibiotics)..    Review of Systems   Constitutional:  Negative for fever, chills and fatigue.   HENT:  Positive for hearing loss. Negative for nosebleeds, rhinorrhea and congestion.    Eyes:  Negative for redness.   Respiratory:  Positive for snoring, cough, sputum production, wheezing, dyspnea on extertion and use of rescue inhaler. Negative for choking.    Genitourinary:  Negative for hematuria.   Endocrine: Diabetes melllitus Negative for cold intolerance.    Musculoskeletal:  Positive for arthralgias and back pain.   Skin:  Negative for rash.   Gastrointestinal:  Positive for acid reflux. Negative for vomiting.   Neurological:  Negative for syncope and headaches.        History of stroke  Seizure disorder.  Post herpetic neuralgia   Hematological:  Negative for adenopathy. Bleeds easily and excessive bruising.   Psychiatric/Behavioral:  Negative for confusion.         Objective:     /68   Pulse 88   Resp 16   Ht 5' 4" (1.626 m)   Wt 50.9 kg (112 lb 3.4 oz)   LMP  (LMP Unknown)   SpO2 99%   BMI 19.26 kg/m²   Body mass index is 19.26 kg/m².     Physical Exam  Vitals and nursing note reviewed.   Constitutional:       General: She is not in acute distress.     Appearance: Normal appearance. She is well-developed. She is not ill-appearing or toxic-appearing.   HENT:      Head: Normocephalic and atraumatic.      Right Ear: Tympanic membrane and external ear normal.      Left Ear: Tympanic membrane and external ear normal.      Nose: Nose normal.      Mouth/Throat:      Mouth: Mucous membranes are moist.      Tongue: No lesions.      " Pharynx: Oropharynx is clear. Uvula midline. No oropharyngeal exudate.      Tonsils: No tonsillar exudate or tonsillar abscesses.   Eyes:      Conjunctiva/sclera: Conjunctivae normal.      Pupils: Pupils are equal, round, and reactive to light.   Cardiovascular:      Rate and Rhythm: Normal rate and regular rhythm.      Heart sounds: Normal heart sounds. No murmur heard.  Pulmonary:      Effort: Pulmonary effort is normal. No respiratory distress.      Breath sounds: No stridor. Wheezing, rhonchi and rales present.   Abdominal:      General: Bowel sounds are normal.      Palpations: Abdomen is soft.   Musculoskeletal:         General: No tenderness. Normal range of motion.      Cervical back: Normal range of motion and neck supple.   Lymphadenopathy:      Cervical: No cervical adenopathy.   Skin:     General: Skin is warm and dry.      Coloration: Skin is not pale.   Neurological:      Mental Status: She is alert and oriented to person, place, and time.   Psychiatric:         Behavior: Behavior normal. Behavior is cooperative.         Thought Content: Thought content normal.         Judgment: Judgment normal.         Personal Diagnostic Review    5/16/2023 complete PFT Spirometry is normal. Spirometry remains unimproved following bronchodilator. Lung volume determination is normal. Airway mechanics show normal airway resistance and conductance. DLCO is mildly decreased. MVV is mildly decreased    5/16/2023 FeNO is 11, no inflammation of lungs.   Clinical Guide to Interpretation or FeNO Levels :     FeNO  (ppb) LOW INTERMEDIATE HIGH   ADULT VALUES < 25 25-50          > 50   Th2-driven Inflammation Unlikely Likely Significant      Patients FeNO level at this visit : __11__ (ppb)     Interpretation of FeNO measurement in adults:  [x] FENO is less than 25 ppb implies non eosinophilic airway inflammation or the absence of airway inflammation.               Comment: Low FENO (<25 ppb) in adult asthmatics with persistent  symptoms suggests other etiologies for these symptoms and a lower likelihood of benefit from adding or increasing inhaled glucocorticoids.    4/26/2023 6MWD No desaturations requiring supplemental oxygen at rest or exertion.  Exercise capacity is less than predicted  Phase Oxygen Assessment Supplemental O2 Heart   Rate Blood Pressure Paulino Dyspnea Scale Rating   Resting 100 % Room Air 61 bpm 173/81 1   Exercise             Minute             1 100 % Room Air 67 bpm       2 100 % Room Air 70 bpm       3 100 % Room Air 69 bpm       4 100 % Room Air 69 bpm       5 100 % Room Air 69 bpm       6  100 % Room Air 69 bpm 186/76 3   Recovery             Minute             1 100 % Room Air 65 bpm       2 100 % Room Air 64 bpm       3 100 % Room Air 62 bpm       4 100 % Room Air 63 bpm 176/76 3      Six Minute Walk Summary  6MWT Status: completed without stopping  Patient Reported: Dyspnea, Leg pain (hip pain)             Interpretation:  Did the patient stop or pause?: No  Total Time Walked (Calculated): 360 seconds  Final Partial Lap Distance (feet): 50 feet  Total Distance Meters (Calculated): 198.12 meters  Predicted Distance Meters (Calculated): 430.2 meters  Percentage of Predicted (Calculated): 46.05  Peak VO2 (Calculated): 9.92  Mets: 2.83  Has The Patient Had a Previous Six Minute Walk Test?: No     Previous 6MWT Results  Has The Patient Had a Previous Six Minute Walk Test?: No      10/26/2022 CPFT Normal spirometry. Normal lung volumes. Moderate reduction in diffusion capacity - unadjusted for hemoglobin. (DLCO > or equal to 40% and < 60% predicted). This interpretation of diffusing capacity does not take into account the patient's hemoglobin level (Unavailable at the time of testing - hemoglobin assumed to be normal). Flow volume loops demonstrate a restrictive defect.     X-Ray Thoracic Spine AP Lateral  Narrative: EXAMINATION:  XR THORACIC SPINE AP LATERAL    CLINICAL HISTORY:  Pain in thoracic  spine    TECHNIQUE:  AP and lateral views of the thoracic spine    COMPARISON:  03/15/2022    FINDINGS:  There is mild dextrocurvature of the mid to lower thoracic spine.  The vertebral bodies demonstrate a normal height.  The disc space heights appear to be relatively well maintained. The pedicles are intact.  Minimal spondylosis noted at a few levels within the upper thoracic spine and lower thoracic spine.  There is significant disc space narrowing along with some spondylosis present at the L1-2 and L3-4.  Impression: As above    Electronically signed by: David Wallace DO  Date:    04/22/2024  Time:    14:49  CT Chest Without Contrast  Narrative: EXAMINATION:  CT CHEST WITHOUT CONTRAST    CLINICAL HISTORY:  Chronic obstructive pulmonary disease with (acute) exacerbationCough, persistent;    TECHNIQUE:  Axial CT images performed through the chest without intravenous contrast.    COMPARISON:  Chest CT on 04/26/2023    FINDINGS:  The heart, great vessels, and mediastinal structures are within normal limits. No thoracic adenopathy.  Aortic atherosclerosis and coronary artery calcifications.    Chronic bilateral upper and lower lobe subpleural reticulation with honeycombing in the lung basis similar to the prior study.  Associated intra lobular and interlobular septal thickening in the lower lobes.  No ground-glass opacities or alveolar consolidations.  No effusions.  Mild bilateral lower lobe cylindrical bronchiectasis.  No pleural effusions.  No pneumothorax.    The upper abdominal visualized organs are within normal limits.    Multiple old thoracic spine compression fractures.  Impression: Pulmonary fibrosis.  No significant change compared to prior.    All CT scans at this facility are performed  using dose modulation techniques as appropriate to performed exam including the following:  automated exposure control; adjustment of mA and/or kV according to the patients size (this includes techniques or standardized  protocols for targeted exams where dose is matched to indication/reason for exam: i.e. extremities or head);  iterative reconstruction technique.    Electronically signed by: Jeramie Obregon MD  Date:    2024  Time:    14:23      Pulmonary function tests:20 :  FEV1: 2.13 (103.5 % predicted), FVC:  2.44 (91.0 % predicted), FEV1/FVC:  87.75, T.47 ( 90.1 % predicted) RV /TLC 45 ( 102% predicted, DLCO: 14.51 (70.2 % predicted)  Normal spirometry. Lung volumes not done. Diffusion capacity is mildly reduced but corrects for alveolar volume.       Assessment / Plan:       Discussed diagnosis, its evaluation, treatment and usual course. All questions answered.  Requested Prescriptions      No prescriptions requested or ordered in this encounter       Problem List Items Addressed This Visit       Mucopurulent chronic bronchitis - Primary (Chronic)     2024 CT chest no acute findings. Stable pulmonary fibrosis.   Continue trelegy 200 mcg, Albuterol inhaler. Albuterol nebs. Singulair.   Continue Prednisone 10 mg daily x 20 days.  Stop 3% saline determine if still needed. Continue Albuterol nebs 2-4 times daily.  Keep follow up may 20, 2024 jennifer/6mwd.     2024 Zithromax 500 mg, Prednisone 20 mg x 10 days, then 10 mg x 20 days.      4/15/2024 chest xray chronic interstitial findings. No acute infiltrates.     Current maintenance treatment : Trelegy 200 mcg, Albuterol inhaler. Albuterol nebs. 3% NACL nebs 2-3 times a day, begin with twice daily progress to 3Xdaily if tolerated.          Relevant Orders    X-Ray Chest PA And Lateral    CBC auto differential (Completed)    CT Chest Without Contrast    Pulmonary interstitial fibrosis     Stable   2024 CT Chest Chronic bilateral upper and lower lobe subpleural reticulation with honeycombing in the lung basis similar to the prior study.  Associated intra lobular and interlobular septal thickening in the lower lobes.  No ground-glass opacities or alveolar  consolidations.  No effusions.  Mild bilateral lower lobe cylindrical bronchiectasis.  No pleural effusions.  No pneumothorax. Impression: Pulmonary fibrosis.  No significant change compared to prior.         Relevant Orders    CT Chest Without Contrast    Multiple pulmonary nodules     4/22/2024 CT Chest Chronic bilateral upper and lower lobe subpleural reticulation with honeycombing in the lung basis similar to the prior study.  Associated intra lobular and interlobular septal thickening in the lower lobes.  No ground-glass opacities or alveolar consolidations.  No effusions.  Mild bilateral lower lobe cylindrical bronchiectasis.  No pleural effusions.  No pneumothorax. Impression: Pulmonary fibrosis.  No significant change compared to prior.                       SOLID PULMONARY NODULES                      SUBSOLID PULMONARY NODULES                 Moderate persistent asthma without complication     Cough, intermittent chest congestion  Continue trelegy 200 mcg, Albuterol inhaler. Albuterol nebs. Singulair.   Continue Prednisone 10 mg daily x 20 days.  Stop 3% saline determine if still needed. Continue Albuterol nebs 2-4 times daily.  Keep follow up may 2024 jennifer/6mwd.   Referral to allergist determine if allergic trigger or if targeted therapy indicated to improve asthma control.            Relevant Orders    X-Ray Chest PA And Lateral    CBC auto differential (Completed)    Ambulatory referral/consult to Allergy    Chronic diastolic heart failure     Acute on chronic flare 4/22/24 . resume Lasix 20 mg twice daily x 3 days, then 20 mg daily or as directed by cardiology. Continue K+ daily.           Other Visit Diagnoses       SOB (shortness of breath)        Relevant Orders    B-TYPE NATRIURETIC PEPTIDE (Completed)    CT Chest Without Contrast    Subacute cough        Relevant Orders    CT Chest Without Contrast    Interstitial pulmonary disease, unspecified        Acute right-sided thoracic back pain         Relevant Orders    Urinalysis (Completed)    Thoracic spine pain        Relevant Orders    X-Ray Thoracic Spine AP Lateral (Completed)    Acute right flank pain        Relevant Orders    Urinalysis (Completed)          CT chest stable pulmonary fibrosis  Lab normal wbc, normal respiratory mahi on sputums, no indication for additional antibiotic therapy. Complete zosyn and zithromax   Elevated bnp, heart failure, mild, resume Lasix 20 mg twice daily x 3 days, then 20 mg daily or as directed by cardiology. Continue K+ daily.   Thoracic spine pain, no urinary tract infection, no compression fx seen, chronic degenerative findings, patient has Zanaflex on hand to begin for muscle spasm.   Referral to allergist determine if allergic trigger or if targeted therapy indicated to improve asthma control.   Continue trelegy 200 mcg, Albuterol inhaler. Albuterol nebs. Singulair.   Continue Prednisone 10 mg daily x 20 days.  Stop 3% saline determine if still needed. Continue Albuterol nebs 2-4 times daily.  Keep follow up may 2024 jennifer/6mwd.       Follow up in about 4 weeks (around 5/20/2024) for Asthma/sob jennifer/6mwd.

## 2024-04-23 ENCOUNTER — TELEPHONE (OUTPATIENT)
Dept: PULMONOLOGY | Facility: CLINIC | Age: 79
End: 2024-04-23
Payer: MEDICARE

## 2024-04-23 DIAGNOSIS — I10 PRIMARY HYPERTENSION: Primary | ICD-10-CM

## 2024-04-23 NOTE — ASSESSMENT & PLAN NOTE
Acute on chronic flare 4/22/24 . resume Lasix 20 mg twice daily x 3 days, then 20 mg daily or as directed by cardiology. Continue K+ daily.

## 2024-04-23 NOTE — ASSESSMENT & PLAN NOTE
Stable   4/22/2024 CT Chest Chronic bilateral upper and lower lobe subpleural reticulation with honeycombing in the lung basis similar to the prior study.  Associated intra lobular and interlobular septal thickening in the lower lobes.  No ground-glass opacities or alveolar consolidations.  No effusions.  Mild bilateral lower lobe cylindrical bronchiectasis.  No pleural effusions.  No pneumothorax. Impression: Pulmonary fibrosis.  No significant change compared to prior.

## 2024-04-23 NOTE — ASSESSMENT & PLAN NOTE
4/22/2024 CT Chest Chronic bilateral upper and lower lobe subpleural reticulation with honeycombing in the lung basis similar to the prior study.  Associated intra lobular and interlobular septal thickening in the lower lobes.  No ground-glass opacities or alveolar consolidations.  No effusions.  Mild bilateral lower lobe cylindrical bronchiectasis.  No pleural effusions.  No pneumothorax. Impression: Pulmonary fibrosis.  No significant change compared to prior.                       SOLID PULMONARY NODULES                      SUBSOLID PULMONARY NODULES

## 2024-04-23 NOTE — ASSESSMENT & PLAN NOTE
4/22/2024 CT chest no acute findings. Stable pulmonary fibrosis.   Continue trelegy 200 mcg, Albuterol inhaler. Albuterol nebs. Singulair.   Continue Prednisone 10 mg daily x 20 days.  Stop 3% saline determine if still needed. Continue Albuterol nebs 2-4 times daily.  Keep follow up may 20, 2024 [email or web address].     4/5/2024 Zithromax 500 mg, Prednisone 20 mg x 10 days, then 10 mg x 20 days.      4/15/2024 chest xray chronic interstitial findings. No acute infiltrates.     Current maintenance treatment : Trelegy 200 mcg, Albuterol inhaler. Albuterol nebs. 3% NACL nebs 2-3 times a day, begin with twice daily progress to 3Xdaily if tolerated.

## 2024-04-23 NOTE — TELEPHONE ENCOUNTER
Telephoned spoke to brother and patient.   Advised elevated bnp at [phone]  Symptomatic shortness of breath and feeling tired  Advised resume lasix 20 mg twice daily x 3 days, then 20 mg daily follow up cardiology to determine continued dosing of lasix.   Patient is on K+ daily   Negative UA   CBC no elevated wbc, stable anemia

## 2024-04-23 NOTE — ASSESSMENT & PLAN NOTE
Cough, intermittent chest congestion  Continue trelegy 200 mcg, Albuterol inhaler. Albuterol nebs. Singulair.   Continue Prednisone 10 mg daily x 20 days.  Stop 3% saline determine if still needed. Continue Albuterol nebs 2-4 times daily.  Keep follow up may 2024 [email or web address].   Referral to allergist determine if allergic trigger or if targeted therapy indicated to improve asthma control.

## 2024-04-29 ENCOUNTER — OFFICE VISIT (OUTPATIENT)
Dept: ALLERGY | Facility: CLINIC | Age: 79
End: 2024-04-29
Payer: MEDICARE

## 2024-04-29 ENCOUNTER — OFFICE VISIT (OUTPATIENT)
Dept: NEUROLOGY | Facility: CLINIC | Age: 79
End: 2024-04-29
Payer: MEDICARE

## 2024-04-29 ENCOUNTER — LAB VISIT (OUTPATIENT)
Dept: LAB | Facility: HOSPITAL | Age: 79
End: 2024-04-29
Attending: NURSE PRACTITIONER
Payer: MEDICARE

## 2024-04-29 ENCOUNTER — TELEPHONE (OUTPATIENT)
Dept: PAIN MEDICINE | Facility: CLINIC | Age: 79
End: 2024-04-29
Payer: MEDICARE

## 2024-04-29 ENCOUNTER — OFFICE VISIT (OUTPATIENT)
Dept: PAIN MEDICINE | Facility: CLINIC | Age: 79
End: 2024-04-29
Payer: MEDICARE

## 2024-04-29 VITALS
SYSTOLIC BLOOD PRESSURE: 116 MMHG | DIASTOLIC BLOOD PRESSURE: 70 MMHG | HEART RATE: 96 BPM | SYSTOLIC BLOOD PRESSURE: 106 MMHG | TEMPERATURE: 99 F | RESPIRATION RATE: 16 BRPM | BODY MASS INDEX: 19.22 KG/M2 | HEART RATE: 98 BPM | OXYGEN SATURATION: 96 % | DIASTOLIC BLOOD PRESSURE: 73 MMHG | WEIGHT: 112 LBS | HEIGHT: 64 IN | BODY MASS INDEX: 19.22 KG/M2

## 2024-04-29 VITALS
HEIGHT: 64 IN | HEART RATE: 96 BPM | BODY MASS INDEX: 19.15 KG/M2 | SYSTOLIC BLOOD PRESSURE: 108 MMHG | DIASTOLIC BLOOD PRESSURE: 63 MMHG | WEIGHT: 112.19 LBS

## 2024-04-29 DIAGNOSIS — M48.062 SPINAL STENOSIS OF LUMBAR REGION WITH NEUROGENIC CLAUDICATION: ICD-10-CM

## 2024-04-29 DIAGNOSIS — R29.818 OTHER SYMPTOMS AND SIGNS INVOLVING THE NERVOUS SYSTEM: Primary | ICD-10-CM

## 2024-04-29 DIAGNOSIS — J31.0 CHRONIC RHINITIS: ICD-10-CM

## 2024-04-29 DIAGNOSIS — J45.50 SEVERE PERSISTENT ASTHMA WITHOUT COMPLICATION: Primary | ICD-10-CM

## 2024-04-29 DIAGNOSIS — Z86.79 HISTORY OF CEREBRAL ANEURYSM REPAIR: ICD-10-CM

## 2024-04-29 DIAGNOSIS — M53.3 SACROILIAC JOINT PAIN: Primary | ICD-10-CM

## 2024-04-29 DIAGNOSIS — R55 SYNCOPE AND COLLAPSE: ICD-10-CM

## 2024-04-29 DIAGNOSIS — J45.50 SEVERE PERSISTENT ASTHMA WITHOUT COMPLICATION: ICD-10-CM

## 2024-04-29 DIAGNOSIS — G89.4 CHRONIC PAIN SYNDROME: ICD-10-CM

## 2024-04-29 DIAGNOSIS — M54.12 CERVICAL RADICULOPATHY: ICD-10-CM

## 2024-04-29 DIAGNOSIS — Z98.890 HISTORY OF CEREBRAL ANEURYSM REPAIR: ICD-10-CM

## 2024-04-29 DIAGNOSIS — M47.816 LUMBAR SPONDYLOSIS: ICD-10-CM

## 2024-04-29 DIAGNOSIS — M47.812 CERVICAL SPONDYLOSIS: ICD-10-CM

## 2024-04-29 DIAGNOSIS — M70.60 GREATER TROCHANTERIC BURSITIS, UNSPECIFIED LATERALITY: ICD-10-CM

## 2024-04-29 PROBLEM — J45.40 MODERATE PERSISTENT ASTHMA WITHOUT COMPLICATION: Status: RESOLVED | Noted: 2022-05-16 | Resolved: 2024-04-29

## 2024-04-29 LAB
BASOPHILS # BLD AUTO: 0.01 K/UL (ref 0–0.2)
BASOPHILS NFR BLD: 0.1 % (ref 0–1.9)
DIFFERENTIAL METHOD BLD: ABNORMAL
EOSINOPHIL # BLD AUTO: 0.1 K/UL (ref 0–0.5)
EOSINOPHIL NFR BLD: 1.1 % (ref 0–8)
ERYTHROCYTE [DISTWIDTH] IN BLOOD BY AUTOMATED COUNT: 15.5 % (ref 11.5–14.5)
HCT VFR BLD AUTO: 34.7 % (ref 37–48.5)
HGB BLD-MCNC: 10.9 G/DL (ref 12–16)
IMM GRANULOCYTES # BLD AUTO: 0.03 K/UL (ref 0–0.04)
IMM GRANULOCYTES NFR BLD AUTO: 0.4 % (ref 0–0.5)
LYMPHOCYTES # BLD AUTO: 1.5 K/UL (ref 1–4.8)
LYMPHOCYTES NFR BLD: 17.8 % (ref 18–48)
MCH RBC QN AUTO: 29.2 PG (ref 27–31)
MCHC RBC AUTO-ENTMCNC: 31.4 G/DL (ref 32–36)
MCV RBC AUTO: 93 FL (ref 82–98)
MONOCYTES # BLD AUTO: 0.7 K/UL (ref 0.3–1)
MONOCYTES NFR BLD: 8.1 % (ref 4–15)
NEUTROPHILS # BLD AUTO: 6.1 K/UL (ref 1.8–7.7)
NEUTROPHILS NFR BLD: 72.5 % (ref 38–73)
NRBC BLD-RTO: 0 /100 WBC
PLATELET # BLD AUTO: 336 K/UL (ref 150–450)
PMV BLD AUTO: 10.1 FL (ref 9.2–12.9)
RBC # BLD AUTO: 3.73 M/UL (ref 4–5.4)
WBC # BLD AUTO: 8.47 K/UL (ref 3.9–12.7)

## 2024-04-29 PROCEDURE — 3072F LOW RISK FOR RETINOPATHY: CPT | Mod: CPTII,S$GLB,, | Performed by: NURSE PRACTITIONER

## 2024-04-29 PROCEDURE — 1125F AMNT PAIN NOTED PAIN PRSNT: CPT | Mod: CPTII,S$GLB,, | Performed by: STUDENT IN AN ORGANIZED HEALTH CARE EDUCATION/TRAINING PROGRAM

## 2024-04-29 PROCEDURE — 1159F MED LIST DOCD IN RCRD: CPT | Mod: CPTII,S$GLB,, | Performed by: NURSE PRACTITIONER

## 2024-04-29 PROCEDURE — 1101F PT FALLS ASSESS-DOCD LE1/YR: CPT | Mod: CPTII,S$GLB,, | Performed by: NURSE PRACTITIONER

## 2024-04-29 PROCEDURE — 99999 PR PBB SHADOW E&M-EST. PATIENT-LVL III: CPT | Mod: PBBFAC,,, | Performed by: STUDENT IN AN ORGANIZED HEALTH CARE EDUCATION/TRAINING PROGRAM

## 2024-04-29 PROCEDURE — 3078F DIAST BP <80 MM HG: CPT | Mod: CPTII,S$GLB,, | Performed by: NURSE PRACTITIONER

## 2024-04-29 PROCEDURE — 3074F SYST BP LT 130 MM HG: CPT | Mod: CPTII,S$GLB,, | Performed by: NURSE PRACTITIONER

## 2024-04-29 PROCEDURE — 3074F SYST BP LT 130 MM HG: CPT | Mod: CPTII,S$GLB,, | Performed by: STUDENT IN AN ORGANIZED HEALTH CARE EDUCATION/TRAINING PROGRAM

## 2024-04-29 PROCEDURE — 99999 PR PBB SHADOW E&M-EST. PATIENT-LVL V: CPT | Mod: PBBFAC,,, | Performed by: NURSE PRACTITIONER

## 2024-04-29 PROCEDURE — 3072F LOW RISK FOR RETINOPATHY: CPT | Mod: CPTII,S$GLB,, | Performed by: STUDENT IN AN ORGANIZED HEALTH CARE EDUCATION/TRAINING PROGRAM

## 2024-04-29 PROCEDURE — 99214 OFFICE O/P EST MOD 30 MIN: CPT | Mod: S$GLB,,, | Performed by: NURSE PRACTITIONER

## 2024-04-29 PROCEDURE — 82785 ASSAY OF IGE: CPT | Performed by: STUDENT IN AN ORGANIZED HEALTH CARE EDUCATION/TRAINING PROGRAM

## 2024-04-29 PROCEDURE — 1160F RVW MEDS BY RX/DR IN RCRD: CPT | Mod: CPTII,S$GLB,, | Performed by: NURSE PRACTITIONER

## 2024-04-29 PROCEDURE — 36415 COLL VENOUS BLD VENIPUNCTURE: CPT | Performed by: STUDENT IN AN ORGANIZED HEALTH CARE EDUCATION/TRAINING PROGRAM

## 2024-04-29 PROCEDURE — 3288F FALL RISK ASSESSMENT DOCD: CPT | Mod: CPTII,S$GLB,, | Performed by: STUDENT IN AN ORGANIZED HEALTH CARE EDUCATION/TRAINING PROGRAM

## 2024-04-29 PROCEDURE — 99204 OFFICE O/P NEW MOD 45 MIN: CPT | Mod: 25,S$GLB,, | Performed by: STUDENT IN AN ORGANIZED HEALTH CARE EDUCATION/TRAINING PROGRAM

## 2024-04-29 PROCEDURE — 3078F DIAST BP <80 MM HG: CPT | Mod: CPTII,S$GLB,, | Performed by: STUDENT IN AN ORGANIZED HEALTH CARE EDUCATION/TRAINING PROGRAM

## 2024-04-29 PROCEDURE — 99215 OFFICE O/P EST HI 40 MIN: CPT | Mod: S$GLB,,, | Performed by: NURSE PRACTITIONER

## 2024-04-29 PROCEDURE — 85025 COMPLETE CBC W/AUTO DIFF WBC: CPT | Performed by: STUDENT IN AN ORGANIZED HEALTH CARE EDUCATION/TRAINING PROGRAM

## 2024-04-29 PROCEDURE — 99999 PR PBB SHADOW E&M-EST. PATIENT-LVL IV: CPT | Mod: PBBFAC,,, | Performed by: NURSE PRACTITIONER

## 2024-04-29 PROCEDURE — 3288F FALL RISK ASSESSMENT DOCD: CPT | Mod: CPTII,S$GLB,, | Performed by: NURSE PRACTITIONER

## 2024-04-29 PROCEDURE — 94664 DEMO&/EVAL PT USE INHALER: CPT | Mod: S$GLB,,, | Performed by: STUDENT IN AN ORGANIZED HEALTH CARE EDUCATION/TRAINING PROGRAM

## 2024-04-29 PROCEDURE — 1101F PT FALLS ASSESS-DOCD LE1/YR: CPT | Mod: CPTII,S$GLB,, | Performed by: STUDENT IN AN ORGANIZED HEALTH CARE EDUCATION/TRAINING PROGRAM

## 2024-04-29 PROCEDURE — 1125F AMNT PAIN NOTED PAIN PRSNT: CPT | Mod: CPTII,S$GLB,, | Performed by: NURSE PRACTITIONER

## 2024-04-29 PROCEDURE — 1126F AMNT PAIN NOTED NONE PRSNT: CPT | Mod: CPTII,S$GLB,, | Performed by: NURSE PRACTITIONER

## 2024-04-29 RX ORDER — LEVOFLOXACIN 750 MG/1
750 TABLET ORAL 2 TIMES DAILY
Qty: 60 TABLET | Refills: 11 | Status: SHIPPED | OUTPATIENT
Start: 2024-04-29 | End: 2024-05-03 | Stop reason: CLARIF

## 2024-04-29 RX ORDER — PREDNISONE 20 MG/1
40 TABLET ORAL DAILY
Qty: 10 TABLET | Refills: 0 | Status: SHIPPED | OUTPATIENT
Start: 2024-04-29 | End: 2024-05-04

## 2024-04-29 RX ORDER — MONTELUKAST SODIUM 10 MG/1
10 TABLET ORAL DAILY
Qty: 90 TABLET | Refills: 3 | Status: SHIPPED | OUTPATIENT
Start: 2024-04-29 | End: 2025-04-29

## 2024-04-29 RX ORDER — LEVETIRACETAM 500 MG/1
500 TABLET ORAL 2 TIMES DAILY
Qty: 60 TABLET | Refills: 2 | Status: SHIPPED | OUTPATIENT
Start: 2024-04-29 | End: 2024-04-29

## 2024-04-29 NOTE — PROGRESS NOTES
"Subjective:       Patient ID: Shireen Felix is a 78 y.o. female.    Chief Complaint: Hallucinatons (Headache and body ache pain)          HPI         BACKGROUND HISTORY       The patient is here to establish care for numerous, complex chronic neurological disorders.    Had a stroke in 2000/2001 that caused LT HP. On Plavix. 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 Ordered F/U CTA H/N.    Was started on PHT for "seizure prophylaxis" after the aneurysm and was stopped after 2 years. Around 7726-6711 she started having drop attacks with LOC for few minutes with no seizure activity. Was started on OXC and then switched to  mg QD by neurology at [facility].  She was undergoing cardiac evaluation. Stopped driving in 2017.  Ordered  EEG A/S.Changes ESL to 800 mg QD.    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 Failed PGB. She is on  mg BID and Lidocaine patches. GCA was suspected with no TORO.   Changed ESL to 800 mg QD and added neuropathic pain cream. Checked ESR.    The patient is also complaining of memory difficulties and remains independent. Ordered CTH, T4, FA,, HC, B12, MMA, RPR.    No new stroke symptoms. On Plavix and Vascular Risk Factors (VRFs).    On 10- CTA H/N Stable. No evidence of acute intracranial hemorrhage.Bilateral craniotomies with LT MCA aneurysm clip.No evidence of recurrent or residual aneurysm sac.  No other aneurysms.    09- through 10-  92 hours AEEG B/L TLE, F8-T8, F7-T7. Continues to have "seizures" on   mg QD. Hyponatremia continues to be a problem.      On 08- ESR is NL for her age and improving 80>67>58 (Unlikely GCA/TA).   mg QD helped with TN and PHN.     Memory is stable. On 10- CTA H/N Stable. No evidence of acute intracranial " "hemorrhage.Bilateral craniotomies with LT MCA aneurysm clip.No evidence of recurrent or residual aneurysm sac.  No other aneurysms. On 10- Labs NL B12-MMA, FA-HC, T4, RPR.     Snuffed from recurrent falls. Continues to have "seizures" on   mg QD. Hyponatremia continues to be a problem.  Her polypharmacy is extremely extensive. On 02- CT C/L Spine MRI  Multilevel DDD    INTERVAL HISTORY     Patient present for follow up for Multiple Neurological Symptoms. Accompanied by family member. Patient is ill appearing. Sitting up in wheelchair. Patient reports she is not feeling well she states she feels tired. She also report return of excruciating headaches, she reports she had similar headache complaint when she had an aneurysm. Will order CTA H&N.     No new stroke symptoms. Plavix and Vascular Risk Factors (VRFs).    Hyponatremia resolved 04-  NL  level  Normalized since  mg QD helped with TN and PHN.     Memory is stable.     Continues to have "seizures" on   mg QD. Tolerating  mg po BID. Her polypharmacy is extremely extensive. Patient reports last seizure episode 4/20/2024. Discussed plan of care to titrate the dose of  mg po BID. Patient verbalized understanding.            Review of Systems   Constitutional:  Positive for fatigue. Negative for appetite change.   HENT:  Negative for hearing loss and tinnitus.    Eyes:  Negative for photophobia and visual disturbance.   Respiratory:  Positive for shortness of breath. Negative for apnea.    Cardiovascular:  Negative for chest pain and palpitations.   Gastrointestinal:  Negative for nausea and vomiting.   Endocrine: Negative for cold intolerance and heat intolerance.   Genitourinary:  Positive for urgency. Negative for difficulty urinating.   Musculoskeletal:  Positive for arthralgias, back pain, gait problem and neck pain. Negative for joint swelling, myalgias and neck stiffness.   Skin:  Negative for color " change and rash.   Allergic/Immunologic: Negative for environmental allergies and immunocompromised state.   Neurological:  Positive for dizziness, seizures, syncope, weakness, light-headedness and numbness. Negative for tremors, facial asymmetry, speech difficulty and headaches.   Hematological:  Negative for adenopathy. Does not bruise/bleed easily.   Psychiatric/Behavioral:  Positive for confusion, dysphoric mood and sleep disturbance. Negative for agitation, behavioral problems, decreased concentration, hallucinations, self-injury and suicidal ideas. The patient is nervous/anxious. The patient is not hyperactive.                  Current Outpatient Medications:     albuterol (PROVENTIL) 2.5 mg /3 mL (0.083 %) nebulizer solution, EMPTY 1 VIAL INTO NEBULIZER EVERY 4 HOURS AS NEEDED FRO RESCUE, Disp: 180 mL, Rfl: 11    albuterol (PROVENTIL/VENTOLIN HFA) 90 mcg/actuation inhaler, INHALE TWO PUFFS INTO THE LUNGS EVERY 4 HOURS AS NEEDED, Disp: 18 g, Rfl: 11    albuterol-budesonide (AIRSUPRA) 90-80 mcg/actuation, Inhale 2 puffs into the lungs every 4 (four) hours as needed (wheezing, cough)., Disp: 10.7 g, Rfl: 11    APTIOM 400 mg Tab tablet, Take 400 mg by mouth., Disp: , Rfl:     atorvastatin (LIPITOR) 40 MG tablet, TAKE 1 TABLET BY MOUTH ONCE DAILY, Disp: 90 tablet, Rfl: 1    carvediloL (COREG) 25 MG tablet, Take 25 mg by mouth 2 (two) times daily., Disp: , Rfl:     cholecalciferol, vitamin D3, 1,250 mcg (50,000 unit) capsule, Take 1 capsule (50,000 Units total) by mouth every 7 days., Disp: 12 capsule, Rfl: 0    cholestyramine (QUESTRAN) 4 gram packet, TAKE 1 PACKET BY MOUTH TWICE DAILY AS DIRECTED, Disp: 60 packet, Rfl: 11    clopidogreL (PLAVIX) 75 mg tablet, Take 1 tablet (75 mg total) by mouth once daily., Disp: 90 tablet, Rfl: 3    denosumab (PROLIA) 60 mg/mL Syrg, every 6 (six) months., Disp: , Rfl:     dicyclomine (BENTYL) 10 MG capsule, Take 1 capsule (10 mg total) by mouth 2 (two) times daily., Disp: 10  capsule, Rfl: 0    diltiaZEM (CARDIZEM CD) 120 MG Cp24, Take 2 capsules (240 mg total) by mouth once daily., Disp: 180 capsule, Rfl: 1    dorzolamide-timolol 2-0.5% (COSOPT) 22.3-6.8 mg/mL ophthalmic solution, Place 1 drop into both eyes every 12 (twelve) hours., Disp: 10 mL, Rfl: 12    EScitalopram oxalate (LEXAPRO) 20 MG tablet, TAKE 1 TABLET BY MOUTH ONCE DAILY, Disp: 90 tablet, Rfl: 2    fluconazole (DIFLUCAN) 150 MG Tab, Take 1 tablet (150 mg total) by mouth once daily., Disp: 2 tablet, Rfl: 0    fluocinonide (LIDEX) 0.05 % external solution, Apply topically 2 (two) times daily. Use on itchy spots on scalp and ear, Disp: 60 mL, Rfl: 2    fluticasone propionate (FLONASE) 50 mcg/actuation nasal spray, USE 2 SPRAYS INTO EACH NOSTRIL ONCE A DAY AT 6AM AS DIRECTED, Disp: 16 g, Rfl: 11    fluticasone-umeclidin-vilanter (TRELEGY ELLIPTA) 200-62.5-25 mcg inhaler, INHALE 1 PUFF INTO THE LUNGS DAILY, Disp: 60 each, Rfl: 11    furosemide (LASIX) 20 MG tablet, Take 20 mg by mouth daily as needed., Disp: , Rfl:     gabapentin (NEURONTIN) 300 MG capsule, Take 1 capsule (300 mg total) by mouth 3 (three) times daily., Disp: 90 capsule, Rfl: 1    HYDROcodone-acetaminophen (NORCO) 5-325 mg per tablet, Take 1 tablet by mouth every 6 (six) hours as needed for Pain., Disp: , Rfl:     hydrOXYzine HCL (ATARAX) 25 MG tablet, TAKE 1 TABLET BY MOUTH EVERY EVENING, Disp: 30 tablet, Rfl: 2    ibuprofen (ADVIL,MOTRIN) 800 MG tablet, Take 1 tablet (800 mg total) by mouth 3 (three) times daily., Disp: 60 tablet, Rfl: 0    ipratropium (ATROVENT) 42 mcg (0.06 %) nasal spray, USE 2 SPRAYS INTO EACH NOSTRIL FOUR TIMES DAILY, Disp: 15 mL, Rfl: 11    isosorbide mononitrate (IMDUR) 60 MG 24 hr tablet, Take 1 tablet (60 mg total) by mouth every evening., Disp: 90 tablet, Rfl: 1    ketoconazole (NIZORAL) 2 % cream, Apply topically 2 (two) times daily. For dry flaky patches of ear., Disp: 30 g, Rfl: 1    latanoprost 0.005 % ophthalmic solution, Place  1 drop into both eyes every evening., Disp: 2.5 mL, Rfl: 12    linezolid (ZYVOX) 600 mg Tab, Take 1 tablet (600 mg total) by mouth every 12 (twelve) hours., Disp: 14 tablet, Rfl: 0    magnesium oxide (MAG-OX) 400 mg (241.3 mg magnesium) tablet, Take 1 tablet (400 mg total) by mouth once daily., Disp: 90 tablet, Rfl: 1    metoprolol tartrate (LOPRESSOR) 100 MG tablet, Take 100 mg by mouth 2 (two) times daily., Disp: , Rfl:     montelukast (SINGULAIR) 10 mg tablet, Take 1 tablet (10 mg total) by mouth once daily., Disp: 90 tablet, Rfl: 3    nitroGLYCERIN (NITROSTAT) 0.4 MG SL tablet, DISSOLVE 1 TABLET UNDER TONGUE EVERY 5 MINUTES FOR CHEST PAIN (MAY TAKE UP TO 3 DOSES THEN SEEK MEDICAL ATTENTION), Disp: 25 tablet, Rfl: 0    omeprazole (PRILOSEC) 40 MG capsule, Take 40 mg by mouth every morning., Disp: , Rfl:     ondansetron (ZOFRAN-ODT) 4 MG TbDL, Take 1 tablet (4 mg total) by mouth every 6 (six) hours as needed., Disp: 30 tablet, Rfl: 0    pantoprazole (PROTONIX) 40 MG tablet, Take 40 mg by mouth 2 (two) times daily., Disp: , Rfl:     potassium chloride SA (K-DUR,KLOR-CON) 20 MEQ tablet, Take 1 tablet (20 mEq total) by mouth once daily. Take extra dose with Lasix - fluid pill, Disp: 90 tablet, Rfl: 1    predniSONE (DELTASONE) 10 MG tablet, Take 1 tablet (10 mg total) by mouth once daily., Disp: 30 tablet, Rfl: 0    predniSONE (DELTASONE) 20 MG tablet, Take 2 tablets (40 mg total) by mouth once daily. for 5 days, Disp: 10 tablet, Rfl: 0    ranolazine (RANEXA) 1,000 mg Tb12, TAKE 1 TABLET BY MOUTH TWICE DAILY, Disp: 180 tablet, Rfl: 3    roflumilast (DALIRESP) 500 mcg Tab, TAKE ONE TABLET BY MOUTH DAILY, Disp: 90 tablet, Rfl: 3    sodium chloride 3% 3 % nebulizer solution, Take 4 mLs by nebulization as needed for Cough (chest congestion)., Disp: 360 mL, Rfl: 11    tezepelumab-ekko 210 mg/1.91 mL (110 mg/mL) PnIj, Inject 210 mg into the skin every 28 days., Disp: 1.91 mL, Rfl: 11    tiZANidine (ZANAFLEX) 4 MG tablet,  Take 4 mg by mouth every 8 (eight) hours., Disp: , Rfl:     triamcinolone acetonide 0.025% (KENALOG) 0.025 % cream, Apply topically 2 (two) times daily. PRN rash and itching of ear. Mild steroid cream., Disp: 30 g, Rfl: 0    levoFLOXacin (LEVAQUIN) 750 MG tablet, Take 1 tablet (750 mg total) by mouth 2 (two) times a day., Disp: 60 tablet, Rfl: 11    montelukast (SINGULAIR) 10 mg tablet, Take 1 tablet (10 mg total) by mouth once daily., Disp: 90 tablet, Rfl: 3    MYRBETRIQ 50 mg Tb24, TAKE 1 TABLET BY MOUTH ONCE DAILY, Disp: 30 tablet, Rfl: 0  No current facility-administered medications for this visit.  Past Medical History:   Diagnosis Date    Abnormal ECG 8/5/2019    Aneurysm     Anticoagulant long-term use     Arthritis     CAD in native artery 8/5/2019    COPD (chronic obstructive pulmonary disease)     Diabetes mellitus     Fall 10/10/2019    Formatting of this note might be different from the original. Found sitting on floor next to bed last night Mild confusion today Does not recall falling or how she ended up on floor UA today Labs yesterday unremarkable    Glaucoma     Hypertension     Seizures     Shingles 05/27/2017    Stroke      Past Surgical History:   Procedure Laterality Date    BRAIN SURGERY      CARDIAC CATHETERIZATION      COLONOSCOPY N/A 09/21/2023    Procedure: COLONOSCOPY;  Surgeon: Joanna Leal MD;  Location: [facility] ENDO;  Service: Endoscopy;  Laterality: N/A;    CORONARY ANGIOPLASTY      EPIDURAL STEROID INJECTION INTO CERVICAL SPINE N/A 2/7/2024    Procedure: Cervical IL XANDER, Level C6/7, RN IV sedation;  Surgeon: Francisco Oshea MD;  Location: [facility] PAIN MGT;  Service: Pain Management;  Laterality: N/A;    EPIDURAL STEROID INJECTION INTO LUMBAR SPINE Bilateral 11/12/2019    Procedure: TF XANDER L4/5;  Surgeon: Desean Dias MD;  Location: [facility] PAIN MGT;  Service: Pain Management;  Laterality: Bilateral;    HYSTERECTOMY      INJECTION OF ANESTHETIC AGENT AROUND MEDIAL BRANCH NERVES  INNERVATING LUMBAR FACET JOINT Bilateral 01/31/2020    Procedure: Bilateral L3-5 MBB;  Surgeon: Desean Dias MD;  Location: [facility] PAIN MGT;  Service: Pain Management;  Laterality: Bilateral;    INJECTION OF ANESTHETIC AGENT INTO SACROILIAC JOINT Right 11/16/2021    Procedure: Right BLOCK, SACROILIAC JOINT Right GTB with RN IV sedation;  Surgeon: Yao Fulton MD;  [facility];  Service: Pain Management;  Laterality: Right;    INJECTION OF ANESTHETIC AGENT INTO SACROILIAC JOINT Bilateral 07/28/2023    Procedure: Bilateral GT bursa + bilateral SIJ injection;  Surgeon: Francisco Oshea MD;  Location: [facility] PAIN MGT;  Service: Pain Management;  Laterality: Bilateral;    INJECTION OF JOINT Bilateral 07/09/2020    Procedure: Bilateral shoulder GH Joint injection with local;  Surgeon: Desean Dias MD;  Location: [facility];  Service: Pain Management;  Laterality: Bilateral;    INJECTION OF JOINT Bilateral 07/28/2023    Procedure: Bilateral GT bursa + bilateral SIJ injection NEEDS ADDITIONAL SEDATION AND MORE TIME BEFORE INJECTION RN IV Sedation;  Surgeon: Francisco Oshea MD;  Location: [facility] PAIN MGT;  Service: Pain Management;  Laterality: Bilateral;    INJECTION OF JOINT N/A 10/25/2023    Procedure: Sacrococcygeal joint injection;  Surgeon: Francisco Oshea MD;  [facility]: [facility];  Service: Pain Management;  Laterality: N/A;    OOPHORECTOMY      SELECTIVE INJECTION OF ANESTHETIC AGENT AROUND LUMBAR SPINAL NERVE ROOT BY TRANSFORAMINAL APPROACH Bilateral 04/26/2023    Procedure: Bilateral L4/5 TF XANDER RN IV Sedation;  Surgeon: Francisco Oshea MD;  Location: [facility] PAIN MGT;  Service: Pain Management;  Laterality: Bilateral;    TRANSFORAMINAL EPIDURAL INJECTION OF STEROID Bilateral 07/23/2019    Procedure: Bilateral L3/4 Transforaminal Epidural Steroid Injection;  Surgeon: Desean Dias MD;  Location: [facility] PAIN MGT;  Service: Pain Management;  Laterality: Bilateral;    TRANSFORAMINAL EPIDURAL INJECTION  OF STEROID Bilateral 03/10/2020    Procedure: Right T12/L1 TF XANDER with local;  Surgeon: Desean Dias MD;  Location: [facility] PAIN MGT;  Service: Pain Management;  Laterality: Bilateral;    TRANSFORAMINAL EPIDURAL INJECTION OF STEROID Right 2020    Procedure: Right T12/L1 TFESI Covid day of procedure;  Surgeon: Desean Dias MD;  Location: [facility] PAIN MGT;  Service: Pain Management;  Laterality: Right;     Social History     Socioeconomic History    Marital status: Single   Tobacco Use    Smoking status: Former     Current packs/day: 0.00     Average packs/day: 1 pack/day for 42.3 years (42.3 ttl pk-yrs)     Types: Cigarettes     Start date:      Quit date: 2004     Years since quittin.0    Smokeless tobacco: Former   Substance and Sexual Activity    Alcohol use: No    Drug use: Never    Sexual activity: Not Currently     Partners: Male   Social History Narrative    No pets or smokers in household.     Social Determinants of Health     Financial Resource Strain: Low Risk  (2024)    Received from [employer] [facility] and Its Subsidiaries and Affiliates    Overall Financial Resource Strain (CARDIA)     Difficulty of Paying Living Expenses: Not hard at all   Food Insecurity: No Food Insecurity (2024)    [facility] from [email or web address] and [facility] Subsidiaries and Affiliates    Hunger Vital Sign     Worried About Running Out of Food in the Last Year: Never true     Ran Out of Food in the Last Year: Never true   Transportation Needs: No Transportation Needs (2024)    Received from [facility] Subsidiaries and Affiliates    PRAPARE - Transportation     Lack of Transportation (Medical): No     Lack of Transportation (Non-Medical): No   Stress: Stress Concern Present (2020)    [employer] [place] [employer] - Occupational Stress Questionnaire     Feeling of Stress : Very  much             Past/Current Medical/Surgical History, Past/Current Social History, Past/Current Family History and Past/Current Medications were reviewed in detail.        Objective:           VITAL SIGNS WERE REVIEWED      GENERAL APPEARANCE:     The patient looks comfortable.    BMI 24.62    No signs of respiratory distress.    Normal breathing pattern.    No dysmorphic features    Normal eye contact.     GENERAL MEDICAL EXAM:    HEENT:  Head is atraumatic normocephalic. Fundoscopic (Ophthalmoscopic) exam showed no disc edema.      Neck and Axillae: No JVD. No visible lesions.    Cardiopulmonary: No cyanosis. No tachypnea. Normal respiratory effort.    Gastrointestinal/Urogenital:  No jaundice. No stomas or lesions. No visible hernias. No catheters.     Skin, Hair and Nails: Multiple ecchymoses. No stigmata of autoimmune disease. No clubbing.    Limbs: No varicose veins. No visible swelling.    Muskoskeletal: No visible deformities.No visible lesions.           Neurologic Exam     Mental Status   Oriented to person, place, and time.   Follows 3 step commands.   Attention: normal. Concentration: normal.   Speech: speech is normal   Level of consciousness: alert  Able to perform simple calculations.   Able to name object. Able to repeat. Normal comprehension.     Cranial Nerves     CN II   Visual acuity: decreased  Right visual field deficit: none  Left visual field deficit: upper temporal and upper nasal quadrant(s)    CN III, IV, VI   Pupils are equal, round, and reactive to light.  Extraocular motions are normal.   Right pupil: Size: 2 mm. Shape: regular. Reactivity: brisk. Consensual response: intact. Accommodation: intact.   Left pupil: Size: 2 mm. Shape: regular. Reactivity: brisk. Consensual response: intact. Accommodation: intact.   CN III: no CN III palsy  CN VI: no CN VI palsy  Nystagmus: none   Diplopia: none  Ophthalmoparesis: none  Upgaze: normal  Downgaze: normal  Conjugate gaze:  present  Vestibulo-ocular reflex: present    CN V   Facial sensation intact.   Right facial sensation deficit: none  Left facial sensation deficit: none    CN VII   Facial expression full, symmetric.   Right facial weakness: none  Left facial weakness: none    CN VIII   CN VIII normal.   Hearing: intact    CN IX, X   CN IX normal.   CN X normal.   Palate: symmetric    CN XI   CN XI normal.   Right sternocleidomastoid strength: normal  Left sternocleidomastoid strength: normal  Right trapezius strength: normal  Left trapezius strength: normal    CN XII   CN XII normal.   Tongue: not atrophic  Fasciculations: absent  Tongue deviation: none    Motor Exam   Muscle bulk: decreased  Right arm tone: normal  Left arm tone: increased  Right leg tone: normal  Left leg tone: normal    Strength   Strength 5/5 throughout.   Right neck flexion: 5/5  Left neck flexion: 4/5  Right neck extension: 5/5  Left neck extension: 4/5  Right deltoid: 5/5  Left deltoid: 4/5  Right biceps: 5/5  Left biceps: 4/5  Right triceps: 5/5  Left triceps: 4/5  Right wrist flexion: 5/5  Left wrist flexion: 4/5  Right wrist extension: 5/5  Left wrist extension: 4/5  Right interossei: 5/5  Left interossei: 4/5  Right iliopsoas: 5/5  Left iliopsoas: 5/5  Right quadriceps: 5/5  Left quadriceps: 5/5  Right hamstrin/5  Left hamstrin/5  Right glutei: 5/5  Left glutei: 5/5  Right anterior tibial: 5/5  Left anterior tibial: 5/5  Right posterior tibial: 5/5  Left posterior tibial: 5/5  Right peroneal: 5/5  Left peroneal: 5/5  Right gastroc: 5/5  Left gastroc: 5/5    Sensory Exam   Right arm light touch: decreased from wrist  Left arm light touch: decreased from wrist  Right leg light touch: decreased from ankle  Left leg light touch: decreased from ankle  Right arm vibration: normal  Left arm vibration: normal  Right leg vibration: decreased from toes  Left leg vibration: decreased from toes  Right arm proprioception: normal  Left arm proprioception:  normal  Right leg proprioception: decreased from toes  Left leg proprioception: decreased from toes  Right arm pinprick: decreased from wrist  Left arm pinprick: decreased from wrist  Right leg pinprick: decreased from ankle  Left leg pinprick: decreased from ankle  Graphesthesia: normal  Stereognosis: normal    Gait, Coordination, and Reflexes     Gait  Gait: (Antalgic)    Coordination   Finger to nose coordination: normal  Heel to shin coordination: normal    Tremor   Resting tremor: absent  Intention tremor: absent  Action tremor: absent    Reflexes   Right brachioradialis: 1+  Left brachioradialis: 1+  Right biceps: 1+  Left biceps: 1+  Right triceps: 1+  Left triceps: 1+  Right patellar: 1+  Left patellar: 1+  Right achilles: 0  Left achilles: 0  Right plantar: normal  Left plantar: normal  Right Thompson: absent  Left Thompson: absent  Right ankle clonus: absent  Left ankle clonus: absent  Right pendular knee jerk: absent  Left pendular knee jerk: absent      Lab Results   Component Value Date    WBC 6.90 04/22/2024    HGB 10.4 (L) 04/22/2024    HCT 34.0 (L) 04/22/2024    MCV 95 04/22/2024     04/22/2024     Sodium   Date Value Ref Range Status   04/26/2024 140 136 - 145 mmol/L Final   01/03/2024 137 136 - 145 mmol/L Final     Potassium   Date Value Ref Range Status   04/26/2024 3.4 (L) 3.5 - 5.1 mmol/L Final   01/03/2024 3.2 (L) 3.5 - 5.1 mmol/L Final     Chloride   Date Value Ref Range Status   04/26/2024 104 100 - 109 mmol/L Final   01/03/2024 104 95 - 110 mmol/L Final     CO2   Date Value Ref Range Status   01/03/2024 22 (L) 23 - 29 mmol/L Final     Carbon Dioxide   Date Value Ref Range Status   04/26/2024 25 22 - 33 mmol/L Final     Glucose   Date Value Ref Range Status   01/03/2024 117 (H) 70 - 110 mg/dL Final     BUN   Date Value Ref Range Status   01/03/2024 10 8 - 23 mg/dL Final     Blood Urea Nitrogen   Date Value Ref Range Status   04/26/2024 13 5 - 25 mg/dL Final     Creatinine   Date Value  Ref Range Status   04/26/2024 0.91 0.55 - 1.02 mg/dL Final   01/03/2024 0.9 0.5 - 1.4 mg/dL Final     Calcium   Date Value Ref Range Status   04/26/2024 9.2 8.8 - 10.6 mg/dL Final   01/03/2024 9.6 8.7 - 10.5 mg/dL Final     Total Protein   Date Value Ref Range Status   11/14/2023 6.9 6.0 - 8.4 g/dL Final     Albumin   Date Value Ref Range Status   11/14/2023 3.2 (L) 3.5 - 5.2 g/dL Final     Albumin Level   Date Value Ref Range Status   04/26/2024 2.9 (L) 3.5 - 5.0 g/dl Final     Total Bilirubin   Date Value Ref Range Status   11/14/2023 0.3 0.1 - 1.0 mg/dL Final     Comment:     For infants and newborns, interpretation of results should be based  on gestational age, weight and in agreement with clinical  observations.    Premature Infant recommended reference ranges:  Up to 24 hours.............<8.0 mg/dL  Up to 48 hours............<12.0 mg/dL  3-5 days..................<15.0 mg/dL  6-29 days.................<15.0 mg/dL       Alkaline Phosphatase   Date Value Ref Range Status   11/14/2023 51 (L) 55 - 135 U/L Final     AST   Date Value Ref Range Status   11/14/2023 17 10 - 40 U/L Final     ALT   Date Value Ref Range Status   11/14/2023 16 10 - 44 U/L Final     Anion Gap   Date Value Ref Range Status   04/26/2024 11 8 - 16 mmol/L Final   01/03/2024 11 8 - 16 mmol/L Final     eGFR if    Date Value Ref Range Status   06/02/2022 >60 >60 mL/min/1.73 m^2 Final     eGFR    Date Value Ref Range Status   04/26/2024 64 mL/min/1.73mSq Final     Comment:     In accordance with NKF-ASN Task Force recommendation, calculation based on the Chronic Kidney Disease Epidemiology Collaboration (CKD-EPI) equation without adjustment for race. eGFR adjusted for gender and age and calculated in ml/min/1.73mSquared. eGFR cannot be calculated if patient is under 18 years of age.     Reference Range:   >= 60 ml/min/1.73mSquared.     eGFR if non    Date Value Ref Range Status   06/02/2022 >60 >60  mL/min/1.73 m^2 Final     Comment:     Calculation used to obtain the estimated glomerular filtration  rate (eGFR) is the CKD-EPI equation.        Lab Results   Component Value Date    [id number] 09/15/2023     Lab Results   Component Value Date    TSH 1.004 09/15/2023    B6CFFPO 5.9 10/27/2021         03->05-    CTH Bilateral craniotomy changes in the frontal and temporal regions.  Aneurysm clip present in the floor of the left middle cranial fossa.  Old appearing lacunar infarct in the anterior limb of the right internal capsule.             [id number]    CT Spine Multilevel DDD          08-    ESR is NL for her age and improving 80>67>58     Unlikely GCA/TA.         08- (Interpreted 08-)    EEG NL       09- through 10-     92 hours AEEG    B/L TLE, F8-T8, F7-T7      10-    CTA H/N Stable. No evidence of acute intracranial hemorrhage.Bilateral craniotomies with LT MCA aneurysm clip.No evidence of recurrent or residual aneurysm sac.  No other aneurysms.      10-    Labs NL    B12-MMA, FA-HC, T4, RPR       02-    CT C/L Spine MRI  Multilevel DDD      05-    CTH No new changes         Reviewed the neuroimaging independently       Assessment:       1. Other symptoms and signs involving the nervous system    2. Syncope and collapse    3. History of cerebral aneurysm repair    4. Spinal stenosis of lumbar region with neurogenic claudication    5. Chronic pain syndrome    6. Cervical radiculopathy              Modified Dunbarton Score (m-RS)      0  The patient has no residual symptoms.    1  The patient has no significant disability; able to carry out all pre-stroke activities.    2  The patient has slight disability; unable to carry out all pre-stroke activities but able to look after self without daily help.    3  The patient has moderate disability; requiring some external help but able to walk without the assistance of another  individual.    4  The patient has moderately severe disability; unable to walk or attend to bodily functions without assistance of another individual.    5  The patient has severe disability; bedridden, incontinent, requires continuous care.    6  The patient has  (during the hospital stay or after discharge from the hospital).      EPILEPSY CLASSIFICATION    SEMIOLOGY:  DROP ATTACKS     EPILEPTOGENIC ZONE (S):  TEMPORAL LOBE     ETIOLOGY: STROKE, CRANIOTOMY     PRIOR AEDS: , PHT, OXC     CURRENT AEDS: ESL     LAST SEIZURE DATE:        COMPREHENSIVE LIST OF AEDs:     Acetazolamide (AZM-Diamox)   Benzos: clonazepam (CZP Klonopin), lorazepam (LZP-Ativan), diazepam (DZP-Valium), clorazepate (CLZ- Tranxene)  Brivaracetam (BRV-Briviact)  Cannabidiol (CBD- Epidiolex)  Carbamazepine (CBZ-Tegretol)  Cenobamate (CNB-Xcopri)  Clobazam (CLB-Onfi)  Eslicarbazepine (ESL-Aptiom)  Ethosuximide (ESX-Zarontin)  Felbamate (FBM-Felbatol)  Fenfluramine (FFA-Fintepla)  Gabapentin (GBP-Neurontin)  Lacosamide (LCM-Vimpat)  Lamotrigine (LTG-Lamitcal)  Levetiracetam (LEV- Keppra)  Oxcarbazepine (OXC-Trileptal)  Perampanel (PML-Fycompa)  Phenobarbital (PB)  Phenytoin (PHT-Dilantin)  Pregabalin (PGB-Lyrica)  Primidone (PRM)   Retigabine (RTG- Potiga) Discontinued in   Rufinamide (RFN-Benzil)  Stiripentol (STP-Diacomit)  Tiagabine (TGB-Gabitril)  Topiramate (TPM-Topamax)  Valproate (VPA-Depakote)  Vigabatrin (VGB-Sabril)  Zonisamide (ZNS-Zonegran)   Plan:           HISTORY OF LACUNAR RT PLIC STROKES        Continue Plavix.     Vascular Risk Factors (VRFs) stratification (BP, BS, BC control and Smoking Cessation) is the mainstay of stroke prevention (>90%)..       Healthy diet and exercise    Avoid driving.    Call 911 if any SUDDEN:   Weakness  Numbness  Slurring of speech  Speech difficulty   Vertigo  Loss of balance  Loss of vision  Loss of hearing  Double vision  Trouble swallowing  Trouble breathing  Facial  drooping        HISTORY OF MULTIPLE ANEURYSMS S/P CRANIOTOMY       Call 911 for the worst head (thunderclap headache).          DROP ATTACKS, SEEM TO BE SYNCOPAL AND EPILEPTIC, TEMPORAL LOBE EPILEPSY       Full seizure precautions and avoid driving.    Taper ESL slowly over 6 weeks due to hyponatremia.     Add  mg BID.     The patient was encouraged to maintain full traditional seizure precautions which include but not limited to avoidance of driving, biking, high altitudes (ladders, escalators, rock climbing, mountain climbing, skiing, jian diving, moderate to difficult hiking), proximity to fire or fire source, proximity to body of water or swimming alone, operating heavy and potentially risky machinery, using sharp objects, using exercise machines like treadmill and weight lifting. Walking while accompanied on soft surfaces like grass is preferable.The patient was encouraged to shower (without accumulation of water) instead of taking a bath if unsupervised. The patient was made aware that these precautions are especially important during concurrent illnesses, fever, infections, vomiting, changing medications and running out of anti-seizure medications. Instructed the patient to avoid night shifts, sleep deprivation and alcohol or recreational drug use as adequate sleep and avoidance of alcohol/drugs are very important measures to assure good seizure control. The patient was also advised not to care for young children without company. The patient is advised to pad the side rails with pillows and blankets if applicable.I strongly recommended lowering the bed to the floor level to decrease the risk of falls during nocturnal seizures that occur during sleep. I also instructed the patient to avoid safety sensitive duties. In general, any activity that requires full awareness and would result in serious injury to self and others if a seizure takes place should be avoided.    AVOID any substance that could lower  seizure threshold including but not limited to:        ALCOHOL AND WITHDRAWAL      TRAMADOL.     MEPERIDINE (DEMEROL)     ALL STIMULANTS-ALL ADHD MEDICATIONS.      CLOZAPINE.      BUPROPION (WELLBUTRIN)     CIPROFLOXACIN.    CYCLOSPORINE.     METOCLOPRAMIDE (REGLAN).     TETRAHYDROCANNABINOL (THC)    KRATOM           POST-HERPETIC NEURALGIA (PHN)        Compound neuropathic pain cream.           BENIGN MEMORY LOSS: MULTIFACTORIAL       Monitor clinically.      RECURRENT FALLS: MULTIFACTORIAL: EXTENSIVE POLYPHARMACY, HYPONATREMIA        CTA H/N Stat     Falling down precautions.    Full seizure precautions and avoid driving.    Aptiom   mg resolved hyponatremia.     Increase  mg BID.     Pharmacy review for polypharmacy                  MEDICAL/SURGICAL COMORBIDITIES     All relevant medical comorbidities noted and managed by primary care physician and medical care team.          MISCELLANEOUS MEDICAL PROBLEMS       HEALTHY LIFESTYLE AND PREVENTATIVE CARE    The patient to adhere to the age-appropriate health maintenance guidelines including screening tests and vaccinations. The patient to adhere to  healthy lifestyle, optimal weight, exercise, healthy diet, good sleep hygiene and avoiding drugs including smoking, alcohol and recreational drugs.        RTC in 6 months              Joey Contreras MSN NP      Collaborating Provider: Emerita Simpson MD, FAAN Neurologist/Epileptologist        No LOS data to display  This includes face to face time and non-face to face time preparing to see the patient (eg, review of tests), obtaining and/or reviewing separately obtained history, documenting clinical information in the electronic or other health record, independently interpreting results and communicating results to the patient/family/caregiver, or care coordinator.

## 2024-04-29 NOTE — TELEPHONE ENCOUNTER
Returned Mr. Abdalla call. States that Mrs. Felix is having some new back pain that has recently started back. Appointment scheduled with Logan BENTLEY. Pt brother verbalized understanding.

## 2024-04-29 NOTE — ASSESSMENT & PLAN NOTE
- Not controlled at this time   - Multifactorial respiratory   - Continue current medications   - Adding Tezspire 210 mg every 28 days   - Educated on proper use of inhalers including an in-person demonstration of proper technique  - Expressed understanding of demonstrated technique  - ED precautions discussed at length   - Will continue to monitor and reassess

## 2024-04-29 NOTE — PROGRESS NOTES
Allergy and Immunology  New Patient Clinic Note    Date: 4/29/2024  Chief Complaint   Patient presents with    Cough     Referred [place]: [place]  [id number] [place] [address]    History  Shireen Felix is a 78 y.o. female being seen as a New Patient today.    Chronic Rhinitis   - Onset: Childhood   - Symptoms: Rhinorrhea, PND, sneezing - phlegm production   - Suspected triggers include: Environmental and COPD   - Pattern: Perennial    Chronic or Inducible Urticaria  - No hx of chronic urticaria     Severe Persistent Asthma/COPD  Pulmonary Fibrosis     - Onset: Asthma appears to have began in childhood   - Hx of smoking with COPD and also pulmonary fibrosis   - Multifactorial disease affecting quality of life   - Patient with multiple admissions and oral steroids for resp exacerbation   - Not controled despite inhalers and escalation to biologics indicated  - Eosinophils zero but on steroids     CRSwNP  - No hx of CRSwNP     Eczema   - No hx of eczema     Eosinophilic Esophagitis  - No hx of eosinophilic esophagitis     Food Allergy  - No hx of food allergy     Drug Allergy  - PCN/Bactrim to be discussed at next appointment     Recurrent Infections  - No hx of recurrent infections     Venom Allergy  - No hx of venom allergy     Allergies, PMH, PSH, Social, and Family History were reviewed.    Review of patient's allergies indicates:   Allergen Reactions    Penicillins Anaphylaxis, Hives, Shortness Of Breath and Swelling    Adhesive Rash    Doxycycline Nausea Only    Oxycodone Other (See Comments)     Fatigue      Sulfa (sulfonamide antibiotics) Itching      Past Medical History:   Diagnosis Date    Abnormal ECG 8/5/2019    Aneurysm     Anticoagulant long-term use     Arthritis     CAD in native artery 8/5/2019    COPD (chronic obstructive pulmonary disease)     Diabetes mellitus     Fall 10/10/2019    Formatting of this note might be different from the original. Found sitting on floor  next to bed last night Mild confusion today Does not recall falling or how she ended up on floor UA today Labs yesterday unremarkable    Glaucoma     Hypertension     Seizures     Shingles 05/27/2017    Stroke      Past Surgical History:   Procedure Laterality Date    BRAIN SURGERY      CARDIAC CATHETERIZATION      COLONOSCOPY N/A 09/21/2023    Procedure: COLONOSCOPY;  Surgeon: Joanna Leal MD;  Location: [facility] ENDO;  Service: Endoscopy;  Laterality: N/A;    CORONARY ANGIOPLASTY      EPIDURAL STEROID INJECTION INTO CERVICAL SPINE N/A 2/7/2024    Procedure: Cervical IL XANDER, Level C6/7, RN IV sedation;  Surgeon: Francisco Oshea MD;  Location: [facility] PAIN MGT;  Service: Pain Management;  Laterality: N/A;    EPIDURAL STEROID INJECTION INTO LUMBAR SPINE Bilateral 11/12/2019    Procedure: TF XANDER L4/5;  Surgeon: Desean Dias MD;  Location: [facility] PAIN MGT;  Service: Pain Management;  Laterality: Bilateral;    HYSTERECTOMY      INJECTION OF ANESTHETIC AGENT AROUND MEDIAL BRANCH NERVES INNERVATING LUMBAR FACET JOINT Bilateral 01/31/2020    Procedure: Bilateral L3-5 MBB;  Surgeon: Desean Dias MD;  Location: [facility] PAIN MGT;  Service: Pain Management;  Laterality: Bilateral;    INJECTION OF ANESTHETIC AGENT INTO SACROILIAC JOINT Right 11/16/2021    Procedure: Right BLOCK, SACROILIAC JOINT Right GTB with RN IV sedation;  Surgeon: Yao Fulton MD;  Location: [facility] PAIN MGT;  Service: Pain Management;  Laterality: Right;    INJECTION OF ANESTHETIC AGENT INTO SACROILIAC JOINT Bilateral 07/28/2023    Procedure: Bilateral GT bursa + bilateral SIJ injection;  Surgeon: Francisco Oshea MD;  Location: [facility] PAIN MGT;  Service: Pain Management;  Laterality: Bilateral;    INJECTION OF JOINT Bilateral 07/09/2020    Procedure: Bilateral shoulder GH Joint injection with local;  Surgeon: Desean Dias MD;  Location: [facility] PAIN MGT;  Service: Pain Management;  Laterality: Bilateral;    INJECTION OF JOINT Bilateral 07/28/2023     Procedure: Bilateral GT bursa + bilateral SIJ injection NEEDS ADDITIONAL SEDATION AND MORE TIME BEFORE INJECTION RN IV Sedation;  Surgeon: Francisco Oshea MD;  Location: [facility] PAIN MGT;  Service: Pain Management;  Laterality: Bilateral;    INJECTION OF JOINT N/A 10/25/2023    Procedure: Sacrococcygeal joint injection;  Surgeon: Francisco Oshea MD;  Location: [facility] PAIN MGT;  Service: Pain Management;  Laterality: N/A;    OOPHORECTOMY      SELECTIVE INJECTION OF ANESTHETIC AGENT AROUND LUMBAR SPINAL NERVE ROOT BY TRANSFORAMINAL APPROACH Bilateral 04/26/2023    Procedure: Bilateral L4/5 TF XANDER RN IV Sedation;  Surgeon: Francisco Oshea MD;  Location: [facility] PAIN MGT;  Service: Pain Management;  Laterality: Bilateral;    TRANSFORAMINAL EPIDURAL INJECTION OF STEROID Bilateral 07/23/2019    Procedure: Bilateral L3/4 Transforaminal Epidural Steroid Injection;  Surgeon: Desean Dias MD;  Location: [facility] PAIN MGT;  Service: Pain Management;  Laterality: Bilateral;    TRANSFORAMINAL EPIDURAL INJECTION OF STEROID Bilateral 03/10/2020    Procedure: Right T12/L1 TF XANDER with local;  Surgeon: Desean Dias MD;  Location: [facility] PAIN MGT;  Service: Pain Management;  Laterality: Bilateral;    TRANSFORAMINAL EPIDURAL INJECTION OF STEROID Right 06/19/2020    Procedure: Right T12/L1 TFESI Covid day of procedure;  Surgeon: Desean Dias MD;  Location: [facility] PAIN MGT;  Service: Pain Management;  Laterality: Right;     Social History     Social History Narrative    No pets or smokers in household.     S/he reports that she quit smoking about 20 years ago. Her smoking use included cigarettes. She started smoking about 62 years ago. She has a 42.3 pack-year smoking history. She has quit using smokeless tobacco. She reports that she does not drink alcohol and does not use drugs.    Current Outpatient Medications on File Prior to Visit   Medication Sig Dispense Refill    albuterol (PROVENTIL) 2.5 mg /3 mL (0.083 %) nebulizer solution EMPTY 1  VIAL INTO NEBULIZER EVERY 4 HOURS AS NEEDED FRO RESCUE 180 mL 11    albuterol (PROVENTIL/VENTOLIN HFA) 90 mcg/actuation inhaler INHALE TWO PUFFS INTO THE LUNGS EVERY 4 HOURS AS NEEDED 18 g 11    albuterol-budesonide (AIRSUPRA) 90-80 mcg/actuation Inhale 2 puffs into the lungs every 4 (four) hours as needed (wheezing, cough). 10.7 g 11    APTIOM 400 mg Tab tablet Take 400 mg by mouth.      atorvastatin (LIPITOR) 40 MG tablet TAKE 1 TABLET BY MOUTH ONCE DAILY 90 tablet 1    carvediloL (COREG) 25 MG tablet Take 25 mg by mouth 2 (two) times daily.      cholecalciferol, vitamin D3, 1,250 mcg (50,000 unit) capsule Take 1 capsule (50,000 Units total) by mouth every 7 days. 12 capsule 0    cholestyramine (QUESTRAN) 4 gram packet TAKE 1 PACKET BY MOUTH TWICE DAILY AS DIRECTED 60 packet 11    clopidogreL (PLAVIX) 75 mg tablet Take 1 tablet (75 mg total) by mouth once daily. 90 tablet 3    denosumab (PROLIA) 60 mg/mL Syrg every 6 (six) months.      dicyclomine (BENTYL) 10 MG capsule Take 1 capsule (10 mg total) by mouth 2 (two) times daily. 10 capsule 0    diltiaZEM (CARDIZEM CD) 120 MG Cp24 Take 2 capsules (240 mg total) by mouth once daily. 180 capsule 1    dorzolamide-timolol 2-0.5% (COSOPT) 22.3-6.8 mg/mL ophthalmic solution Place 1 drop into both eyes every 12 (twelve) hours. 10 mL 12    EScitalopram oxalate (LEXAPRO) 20 MG tablet TAKE 1 TABLET BY MOUTH ONCE DAILY 90 tablet 2    fluconazole (DIFLUCAN) 150 MG Tab Take 1 tablet (150 mg total) by mouth once daily. 2 tablet 0    fluocinonide (LIDEX) 0.05 % external solution Apply topically 2 (two) times daily. Use on itchy spots on scalp and ear 60 mL 2    fluticasone propionate (FLONASE) 50 mcg/actuation nasal spray USE 2 SPRAYS INTO EACH NOSTRIL ONCE A DAY AT 6AM AS DIRECTED 16 g 11    fluticasone-umeclidin-vilanter (TRELEGY ELLIPTA) 200-62.5-25 mcg inhaler INHALE 1 PUFF INTO THE LUNGS DAILY 60 each 11    furosemide (LASIX) 20 MG tablet Take 20 mg by mouth daily as needed.       gabapentin (NEURONTIN) 300 MG capsule Take 1 capsule (300 mg total) by mouth 3 (three) times daily. 90 capsule 1    HYDROcodone-acetaminophen (NORCO) 5-325 mg per tablet Take 1 tablet by mouth every 6 (six) hours as needed for Pain.      hydrOXYzine HCL (ATARAX) 25 MG tablet TAKE 1 TABLET BY MOUTH EVERY EVENING 30 tablet 2    ibuprofen (ADVIL,MOTRIN) 800 MG tablet Take 1 tablet (800 mg total) by mouth 3 (three) times daily. 60 tablet 0    ipratropium (ATROVENT) 42 mcg (0.06 %) nasal spray USE 2 SPRAYS INTO EACH NOSTRIL FOUR TIMES DAILY 15 mL 11    isosorbide mononitrate (IMDUR) 60 MG 24 hr tablet Take 1 tablet (60 mg total) by mouth every evening. 90 tablet 1    ketoconazole (NIZORAL) 2 % cream Apply topically 2 (two) times daily. For dry flaky patches of ear. 30 g 1    latanoprost 0.005 % ophthalmic solution Place 1 drop into both eyes every evening. 2.5 mL 12    linezolid (ZYVOX) 600 mg Tab Take 1 tablet (600 mg total) by mouth every 12 (twelve) hours. 14 tablet 0    magnesium oxide (MAG-OX) 400 mg (241.3 mg magnesium) tablet Take 1 tablet (400 mg total) by mouth once daily. 90 tablet 1    metoprolol tartrate (LOPRESSOR) 100 MG tablet Take 100 mg by mouth 2 (two) times daily.      montelukast (SINGULAIR) 10 mg tablet Take 1 tablet (10 mg total) by mouth once daily. 90 tablet 3    nitroGLYCERIN (NITROSTAT) 0.4 MG SL tablet DISSOLVE 1 TABLET UNDER TONGUE EVERY 5 MINUTES FOR CHEST PAIN (MAY TAKE UP TO 3 DOSES THEN SEEK MEDICAL ATTENTION) 25 tablet 0    omeprazole (PRILOSEC) 40 MG capsule Take 40 mg by mouth every morning.      ondansetron (ZOFRAN-ODT) 4 MG TbDL Take 1 tablet (4 mg total) by mouth every 6 (six) hours as needed. 30 tablet 0    pantoprazole (PROTONIX) 40 MG tablet Take 40 mg by mouth 2 (two) times daily.      potassium chloride SA (K-DUR,KLOR-CON) 20 MEQ tablet Take 1 tablet (20 mEq total) by mouth once daily. Take extra dose with Lasix - fluid pill 90 tablet 1    predniSONE (DELTASONE) 10 MG tablet  Take 1 tablet (10 mg total) by mouth once daily. 30 tablet 0    ranolazine (RANEXA) 1,000 mg Tb12 TAKE 1 TABLET BY MOUTH TWICE DAILY 180 tablet 3    roflumilast (DALIRESP) 500 mcg Tab TAKE ONE TABLET BY MOUTH DAILY 90 tablet 3    sodium chloride 3% 3 % nebulizer solution Take 4 mLs by nebulization as needed for Cough (chest congestion). 360 mL 11    tiZANidine (ZANAFLEX) 4 MG tablet Take 4 mg by mouth every 8 (eight) hours.      triamcinolone acetonide 0.025% (KENALOG) 0.025 % cream Apply topically 2 (two) times daily. PRN rash and itching of ear. Mild steroid cream. 30 g 0    [DISCONTINUED] levETIRAcetam (KEPPRA) 500 MG Tab TAKE 1 TABLET BY MOUTH TWICE DAILY 60 tablet 2    MYRBETRIQ 50 mg Tb24 TAKE 1 TABLET BY MOUTH ONCE DAILY 30 tablet 0    [DISCONTINUED] levETIRAcetam (KEPPRA) 500 MG Tab Take 1 tablet (500 mg total) by mouth 2 (two) times daily. 180 tablet 3     Current Facility-Administered Medications on File Prior to Visit   Medication Dose Route Frequency Provider Last Rate Last Admin    [DISCONTINUED] acetaminophen tablet  500 mg Oral Q6H PRN Provider, Generic External Data        [DISCONTINUED] atorvastatin tablet  40 mg Oral  Provider, Generic External Data        [DISCONTINUED] carvediloL tablet  25 mg Oral  Provider, Generic External Data        [DISCONTINUED] clopidogreL tablet  75 mg Oral  Provider, Generic External Data        [DISCONTINUED] empagliflozin (Jardiance) tablet  10 mg Oral  Provider, Generic External Data        [DISCONTINUED] enoxaparin injection  40 mg Subcutaneous  Provider, Generic External Data        [DISCONTINUED] fluticasone furoate-vilanteroL 100-25 mcg/dose diskus inhaler  1 puff Inhalation  Provider, Generic External Data        [DISCONTINUED] fluticasone propionate 50 mcg/actuation nasal spray  2 spray Nasal Daily PRN Provider, Generic External Data        [DISCONTINUED] isosorbide mononitrate 24 hr tablet  60 mg Oral  Provider, Generic External Data        [DISCONTINUED]  levETIRAcetam tablet  500 mg Oral  Provider, Generic External Data        [DISCONTINUED] LIDOcaine 4 % PtMd  1 patch Topical  Provider, Generic External Data        [DISCONTINUED] melatonin tablet  3 mg Oral  Provider, Generic External Data        [DISCONTINUED] montelukast tablet  10 mg Oral  Provider, Generic External Data        [DISCONTINUED] pantoprazole EC tablet  40 mg Oral  Provider, Generic External Data        [DISCONTINUED] ranolazine 12 hr tablet  500 mg Oral  Provider, Generic External Data        [DISCONTINUED] sacubitriL-valsartan 24-26 mg per tablet  1 tablet Oral  Provider, Generic External Data        [DISCONTINUED] tiotropium bromide 2.5 mcg/actuation inhaler  2 puff Inhalation  Provider, Generic External Data        [DISCONTINUED] venlafaxine 24 hr capsule  150 mg Oral  Provider, Generic External Data         Physical Examination  Vitals:    04/29/24 1520   BP: 106/70   Pulse: 96   Temp: 98.6 °F (37 °C)     GENERAL:  female in no apparent distress and moderately ill  HEAD:  Normocephalic, without obvious abnormality, atraumatic  EYES: sclera anicteric, conjunctiva normochromic  EARS: normal TM's and external ear canals both ears  NOSE: without erythema or discharge, clear discharge, turbinates normal    OROPHARYNX: moist mucous membranes without erythema, exudates or petechiae, clear post-nasal drainage present  LYMPH NODES: normal, supple, no lymphadenopathy  LUNGS: wheezing noted BL lungs.  HEART: normal rate, regular rhythm, normal S1, S2, no murmurs, rubs, clicks or gallops.  ABDOMEN: soft, nontender, nondistended, no masses or organomegaly.  MUSCULOSKELETAL: no gross joint deformity or swelling.  NEURO: alert, oriented, normal speech, no focal findings or movement disorder noted.  SKIN: normal coloration and turgor, no rashes, no suspicious skin lesions noted.     Assessment/Plan:   Problem List Items Addressed This Visit          ENT    Chronic rhinitis       Pulmonary    Severe persistent  asthma without complication - Primary    Current Assessment & Plan     - Not controlled at this time   - Multifactorial respiratory   - Continue current medications   - Adding Tezspire 210 mg every 28 days   - Educated on proper use of inhalers including an in-person demonstration of proper technique  - Expressed understanding of demonstrated technique  - ED precautions discussed at length   - Will continue to monitor and reassess         Relevant Medications    predniSONE (DELTASONE) 20 MG tablet    tezepelumab-ekko 210 mg/1.91 mL (110 mg/mL) PnIj    Other Relevant Orders    Allergen Profile, Zone 6    CBC Auto Differential     Follow up:  Follow up in about 2 months (around 6/29/2024).    Rajani Irwin MD   [facility]  Allergy and Immunology

## 2024-04-29 NOTE — PROGRESS NOTES
Established Patient Interventional Pain Clinic Visit      Chief Pain Complaint:  Neck & arm pain    Interval History (4/29/2024):  Patient Shireen Felix presents today for follow-up visit.  Patient he is seen today for back pain.  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  She recently finished up a steroid taper as well as antibiotics.  She has some upcoming appointments to follow up with Cardiology.  She states while in the hospital she had a lot of coughing but she feels made her lower back and upper back pain worse.  She feels like she is having diffuse spasms in the back.  At times she will have pain that radiates down the back of the legs.  She has pain over bilateral hips especially never lying on either side.  She continues to take her gabapentin 300 mg 3 times a day reports she has not been taking the tizanidine.  She is inquiring about injections today.  She also asked for hydrocodone to help with her pain as this is what gave her relief in the hospital.  Patient denies night fever/night sweats, urinary incontinence, bowel incontinence, significant weight loss and significant motor weakness.   Patient denies any other complaints or concerns at this time.      Interval history 02/27/2024  Patient presents status post C6-7 interlaminar epidural steroid injection with right paramedian approach 02/07/2024.  Patient reports approximately 70%-80% sustained relief in neck, upper extremity radicular symptoms following epidural steroid injection.  She reports significant improvement in radicular symptoms, numbness and tingling in bilateral arms and compromise in hand  strength and dexterity.  Today she reports residual pain particularly in the left shoulder territory.  Of note patient has not received glenohumeral joint injection since 2020 with Dr. Dias.  At that time she does report noticeable improvement exceeding 3 months in duration  with her prior procedure.  Today she does report pain is exacerbated with overhead movements and shoulder internal/external rotation.  She is continued physician directed physical therapy exercises over the last 8 weeks from 12/27/2023 through 02/27/2024 with noticeable improvement in pain, range of motion and functionality and neck and arms and shoulders.    Interval History (4/29/2024):  Shireen Felix presents today for follow-up visit.  Patient was last seen on 11/8/2023. At that visit, the plan was to perform TPI on upper back and return to clinic 3-4 weeks for TPI on lower back. Patient reports pain as 8/10 today.  Patient reports previous TPI did help but she continues to have pain on R side of upper back, bilateral arm pain and numbness/tingling in both hands. She also has pain involving R shoulder joint.     Interval History (11/08/2023):  Shireen Felix presents today for follow-up visit.  she underwent sacrococcygeal joint injection on 2/7/24.  The patient reports that she is/was better following the procedure.  she reports 80% pain relief.  The changes lasted so far.  The changes have continued through this visit. The patient now c/o pain related to myofascial pain in upper back. She also pain in lower back (lumbar paraspinal) but the upper back pain is most concerning at this time.  Patient reports pain as 8/10 today.  The patient also c/o itching in her scalp with hair loss over the past two months.    Interval history 10/11/2023  Patient presents status post bilateral sacroiliac joint and greater trochanteric bursa injection 07/28/2023.  Patient reports approximately 90% sustained relief overlying bilateral sacroiliac joint and GT bursa.  Today she presents following a mechanical fall.  Patient reports approximately 5 days prior going to the bathroom in the middle of the night, losing her balance and hitting the back of her head and lower back.  Today she reports pain which is constant which  is rated an 8/10.  Patient reports pain along bilateral cervical paraspinous musculature which is exacerbated with cervical flexion/extension and lateral flexion.  She reports this pain has improved over the last few days.  Her primary concern is pain along her coccyx.  Patient reports tenderness to touch in pain with sitting as well as sitting or standing.  Patient is requesting x-rays for evaluation.  She also would like to restart physical therapy.  Of note she is going to [facility] in [place] and would like to restart dry needling, soft massage and assistance with balance.    Interval History (6/27/2023):  Shireen Felix presents today for follow-up visit.  Patient was last seen on 5/16/2023. She reports some improvement in her pain with TPIs given at last visit. She reports continued variations in her BP throughout the day. She has followed up with Neurology who has made changes to her seizure medications. Seems to be tolerating med change well. Continues to monitor BP. Has followed up with PCP and other specialist to rule out other causes for BP changes (renal, intracranial, etc).  Patient reports pain as 7/10 today. No changes on CT compared to previous.  Interval History (6/14/2023):  Shireen Felix presents today for trigger point injections.  Patient was last seen on 06/09/2023. Patient reports pain as 8/10 today. She continues to report pain as primarily located in her lower lumbosacral region with radiation into her buttocks, right worse than left, and wrapping around her lateral hips. She reports additional falls since last visit. She reports worsening neck pain with radiation into her left arm down to her hand with cramping and burning.  She also reports worsening symptoms down both legs.  interfering with her ability to walk at times due to significant weakness. She reports repeated falls due to her legs giving out from underneath her unexpectedly.       Interval History (6/9/2023):  Shireen  Veronica Felix presents today via telemed for follow-up visit.  Patient was last seen on 05/16/2023. Last injection bilateral L4/5 TFESI on 4/26/23 with 80% relief. Patient reports pain as 10/10 today. She reports pain that began over the past several weeks, became severe 2 days ago. She reports pain is primarily located in her lower lumbosacral region with radiation into her buttocks, right worse than left, and wrapping around her lateral hips. Pain is similar to that prior to SIJ and GT bursa injection, last SIJ injection 11/16/2021 with excellent relief until a few weeks ago when pain returned. She reports in the past she has also received trigger point injections in her lower back that were very helpful.    Interval History (5/16/2023): Shireen Felix presents today for follow-up visit.  she underwent bilateral L4/5 TFESI on 4/26/23.  The patient reports that she is/was better following the procedure.  she reports 80% pain relief.  The changes lasted 4 weeks so far.  The changes have continued through this visit.  Patient reports pain as 7/10 today. She reports the procedure itself was very painful. Reports she did not feel sedated at all compared to previous procedures with other providers. She is not interested in pursuing additional procedures secondary to the painful procedure.  She reports worsening neck pain with radiation into her right arm down to her hand with cramping and burning.    Interval History (3/9/2023):  Shireen Felix presents today for follow-up visit for CT and EMG review.  Patient was last seen on 2/1/2023. At that visit, the plan was to order updated CT of her cervical and lumbar spine as well as EMG of her bilateral upper extremities. Patient reports pain as 7/10 today.  She reports worsening neck pain with radiation into her left arm down to her hand with cramping and burning.  She also reports worsening symptoms down both legs.  interfering with her ability to walk at times  due to significant weakness. Has an appointment with Dr. Navarro on 03/21/2023. Patient fell hit head on concrete 2-11-23, and had a seizure shortly after.  She reports repeated falls due to her legs giving out from underneath her unexpectedly. Referred to Neurology.  She is also currently on antibiotics secondary to having 10 teeth pulled     Interval History (1/31/2023):  Shireen Felix presents today for follow-up visit.  Patient was last seen on 10/4/2022. At that visit, the plan was to schedule bilateral L3-5 MBB followed 2 weeks later by XANDER.  She canceled these procedures and reports at this time she is not interested in scheduling.  She reports she has a  and goes to the gym 3-4 times per week which includes walking on a treadmill.   Patient reports pain as 7/10 today. She reports worsening neck pain with radiation into her left arm down to her hand with cramping and burning. This has been severe over the past week. Last cervical CT in 2019, she reports she has sustained multiple falls since then. She also reports worsening symptoms down her left leg, interfering with her ability to walk at times due to significant weakness.      Interval history 10/04/2022  Ms. Felix is a 76-year-old female with past medical history significant for cerebral aneurysm status post craniotomy and repair, cerebral vascular accident, left V1 distribution post herpetic neuralgia, depression, glaucoma, asthma/COPD, coronary artery disease, GERD, nicotine dependence, type 2 diabetes who presents to establish care, previous Dr. Dias and Dr. Fulton patient.  Today patient reports pain in the lower back and legs.  Pain is constant today is rated 7/10.  Patient reports pain in a bandlike distribution in the lower back which radiates down the posterior aspects of bilateral lower extremities in L5-S1 distribution to mid thigh.  Pain is described as burning and aching in nature.  Pain is exacerbated when moving from  sitting to standing and standing and ambulating just a few feet.  Patient does report associated weakness in the lower extremities associated with her pain.  Patient reports she is able to ambulate with assistive device, walker only a few feet before requiring rest.  Pain has been improved with prior procedures, including medial branch block and transforaminal epidural steroid injection.  Patient is interested in pursuing repeat injection.  Of note patient has trialed medicinal marijuana with Dr. Mckeon and reports palpitations side effect.  Patient has discontinued tramadol, tizanidine and gabapentin.  Pain is improved with prednisone which she takes prescribed by her pulmonologist.    Interval history:  Dr. Fulton:  05/10/2021-05/06/2022  Shireen Felix is a 76 y.o. female  who is presenting with a chief complaint of Neck Pain. The patient began experiencing this problem insidiously, and the pain has been gradually worsening over the past 6 month(s). The pain is described as throbbing, cramping, aching and heavy and is located in the bilateral cervical spine. Pain is intermittent and lasts hours. The  pain is nonradiating. The patient rates her pain a 7 out of ten and interferes with activities of daily living a 7 out of ten. Pain is exacerbated by rotation of the cervical spine, and is improved by rest. Patient reports no prior trauma, no prior spinal surgery      This patient is a 74 y.o. female who presents today complaining of the above noted pain/s. The patient describes the pain as follows.  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  She reports approximately 2 years ago she had to sudden deaths in the family which caused very stressful time for her and resulted in shingles rash in the left V1 distribution however she reports having excruciating pain in the left V1  and V2 distributions.  She denies having difficulty with eating food and brushing her teeth on the left side of her face however the left lateral side of her nose gets her excruciating pain.  She has been tried on numerous medications in the past including gabapentin, Lyrica, Valtrex, Celebrex, ibuprofen which have all provided minimal benefit however steroids have been most helpful.  Today she rates her pain as an 4/10 and describes a constant burning sensation the left side of her face in addition to radiation into her bilateral lower extremities, her right shoulder, her left upper extremity occasionally as a pins and needle sensation.  She does report having numbness and weakness in her left lower extremity.  She has been physical therapy which she completed in February of 2019 however this caused most of her low back and leg symptoms to worsen in addition to her post herpetic neuralgia to worsen.  She denies having bowel bladder difficulties.  Her symptoms are worse with activity such as walking in her somewhat improved with rest however she had finds it difficult to get into a comfortable position. She has been using topical lidocaine patches and applying to the left side of her face which does provide significant benefit.  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     Shireen Felix is a 76 y.o. female  who is presenting with a chief complaint of lumbar spine. The patient began experiencing this problem insidiously, and the pain has been gradually worsening over the past 2 year(s). The pain is described as throbbing, shooting, burning, aching and electrical and is located in the bilateral lumbar spine. Pain is intermittent and lasts hours. The pain radiates to bilateral lower extremities. The patient rates her pain a 7 out of ten and interferes with activities of daily  living a 6 out of ten. Pain is exacerbated by flexion of the lumbar spine, and is improved by rest. Patient reports no prior trauma, no prior spinal surgery     Interval HPI 06/03/2020: Dr. Dias:  Ms. Felix returns to clinic for follow-up.  She underwent a T12-L1 transforaminal epidural steroid injection on March 10, 2020 and she reports that this has provided significant pain relief for her which radiated into her right leg prior to the injection.  She does report that his symptoms have returned and the injections worn off her pain is currently rated 7/10.  She was scheduled to undergo bilateral lumbar radiofrequency ablation prior to corona virus however she would like to hold off on that at this time and pursue repeat injection in the low back.  Unfortunately she also reports that she had her wheelchair fall over a few weeks ago and this has resulted in bilateral shoulder pain.  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  She finds that rest does provide some pain relief on activity causes her symptoms to worsen.  She has been able to walk significantly more after her most recent injection in March reported she is able to walk several steps before having to sit rest however this has a vast improvement from prior to the injection.    Interval HPI:  02/12/2020; Dr. Dias  Ms. Felix returns to clinic for follow-up.  She underwent bilateral lumbar medial branch blocks on January 31, 2020 and reports 100% symptomatic pain relief for approximately 1 week and his symptoms slowly began to return.  She continues to have some right-sided lower thoracic posterior pain which was not completely dressed with the medial branch blocks.  Today she rates her pain as an 8/10 which is located primarily in the axial lumbar spine and the lower right posterior thoracic region.  She denies having numbness weakness in the legs but  does continue to have a constant aching and throbbing sensation in the low back.    Interval HPI 01/23/2020: Dr. Dias: Ms. Felix returns to clinic for follow-up.  She reports that she underwent bilateral L4/5 transforaminal epidural steroid injections in November 2019 reports ongoing 80% symptomatic pain relief.  She has also been participating physical therapy which she has found to be very helpful however 4 days ago she started to do some leg raise is in her bed and she felt severe pain in her low back which has not subsided.  She denies having any radiation except for around her flank into her anterior abdomen.  She feels like the pain sometimes comes straight through from her back to her abdomen.  Today she rates her pain as a 10/10 describes it as a constant aching and sharp pain. She has been using a heating pad which is minimally helpful and she has been holding off on physical therapy at this time. She denies having bowel bladder difficulty.     Interval History: Patient was seen on 8/4/19. At that time she underwent bilateral L3/4 TF XANDER.  The patient reports that she is/was better following the procedure.  she reports 75% pain relief.  She had a fall, then pain increased. She is currently living at [facility]. She is doing at home therapy there, which is helping. She is in wheelchair now but hopes to transition to walker soon.  After the fall, she was not able to get out of the bed.      Initial History of Present Illness:  Dr. Dias  This patient is a 74 y.o. female who presents today complaining of the above noted pain/s. The patient describes the pain as follows.  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  She reports approximately 2 years ago she had to sudden deaths in the family which caused very stressful time for her and resulted in shingles rash in the left V1  distribution however she reports having excruciating pain in the left V1 and V2 distributions.  She denies having difficulty with eating food and brushing her teeth on the left side of her face however the left lateral side of her nose gets her excruciating pain.  She has been tried on numerous medications in the past including gabapentin, Lyrica, Valtrex, Celebrex, ibuprofen which have all provided minimal benefit however steroids have been most helpful.  Today she rates her pain as an 8/10 and describes a constant burning sensation the left side of her face in addition to radiation into her bilateral lower extremities, her right shoulder, her left upper extremity occasionally as a pins and needle sensation.  She does report having numbness and weakness in her left lower extremity.  She has been physical therapy which she completed in February of 2019 however this caused most of her low back and leg symptoms to worsen in addition to her post herpetic neuralgia to worsen.  She denies having bowel bladder difficulties.  Her symptoms are worse with activity such as walking in her somewhat improved with rest however she had finds it difficult to get into a comfortable position. She has been using topical lidocaine patches and applying to the left side of her face which does provide significant benefit.  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    - pertinent negatives: No fever, No chills, No weight loss, No bladder dysfunction, No bowel dysfunction, No saddle anesthesia  - pertinent positives: generalized nonspecific Lower Extremity weakness bilaterally    - medications, other therapies tried (physical therapy, injections):     >> NSAIDs, Tylenol, Tramadol, Norco, gabapentin, lyrica, flexeril and medrol dose pack    >> Has previously undergone Physical Therapy      Pain  injections:    Dr. Oshea:  -02/07/2024: C6-7 interlaminar epidural steroid injection with right paramedian approach  -10/25/2023: Sacrococcygeal joint injection with 80% relief  -07/28/2023: Bilateral sacroiliac joint and greater trochanteric bursa injection  -04/26/2023: bilateral L4/5 TFESI with 80% relief      -11/16/2021: Right-sided SI joint and greater trochanteric bursa injection; Dr. Fulton  -07/09/2020: Bilateral glenohumeral joint injection; Dr. Dias  -06/19/2020: Right-sided T12/L1 transforaminal epidural steroid injection; Dr. Dias  -03/10/2020:  T12/L1 transforaminal epidural steroid injection; Dr. Dias  -01/31/2020: Bilateral L3-5 medial branch blocks; Dr. Dias  -11/12/2019: Bilateral L4/5 transforaminal epidural steroid injection; Dr. Dias      Imaging / Labs / Studies (reviewed on 4/29/2024):     10/11/23    X-Ray Cervical Spine 5 View W Flex Extxt    Narrative    FINDINGS:  There is no acute fracture or compression deformity. Bones are demineralized. No aggressive lytic or blastic lesion seen.  Mild cervical dextrocurvature noted.  There is mild straightening of the normal cervical lordosis as well as mild anterolisthesis of C4 on C5.  No evidence of instability with flexion/extension.  Remaining alignment is unremarkable.  There is multilevel loss of intervertebral disc height with associated uncovertebral hypertrophy and endplate ossified ptosis, most prominent at C5-C6 and C6-C7.  More mild degenerative disc changes at C3-C4 and C4-C5. Multilevel bilateral facet arthropathy also noted. No gross bony spinal canal stenosis.  Moderate to severe left neuroforaminal stenosis at the C5-C6 and C6-C7 levels.  Additional mild neuroforaminal stenosis at the mid to upper cervical levels.  Left carotid calcifications noted.  Visualized soft tissues are otherwise unremarkable.    Impression  Mild cervical thoracic curvature and straightening of the normal cervical lordosis.  Mild anterolisthesis of C4 on C5  without evidence of instability.  Osteopenia and multilevel degenerative changes as above.      Sacrum/Coccyx X-rays:  EXAM: XR SACRUM AND COCCYX   HISTORY: Pain   FINDINGS:   3 views were obtained of the sacrum/coccyx.   Bilateral hip replacement hardware.  Degenerative changes within the visualized lumbar spine.  No visualized fracture.   The bones are osteopenic.      Impression:   No acute findings are identified.      CT Cervical spine 06/23/2023  FINDINGS:  There is unchanged grade 1 anterolisthesis of C3 on C4 and C4 on C5.  There is no new spondylolisthesis.  There is no loss of vertebral body height.  There is multilevel degenerative disc space narrowing most significant at C5-C6 and C6-C7.  There is unchanged diffuse degenerative facet arthropathy with posterior disc osteophyte complexes and uncovertebral osteophytosis resulting in varying degrees of canal and foraminal stenosis.  This is incompletely evaluated on this exam.     The included portions of the posterior fossa are normal.  The craniocervical junction is symmetric.  The atlantoaxial articulation is normal.  The airway is widely patent.  The thyroid gland is normal.  There is no cervical adenopathy.  The visualized lung apices are clear.     Impression:     Multilevel degenerative spondylolisthesis and spondylosis resulting in varying degrees of canal and foraminal stenosis, grossly unchanged from prior exam.    CT lumbar spine 06/23/2023  FINDINGS:  Five non-rib-bearing lumbar type vertebral bodies are present.  There is mild Levoscoliotic curvature of the lumbar spine centered at L3, similar compared to the prior.     With regards to alignment, there is minimal right lateral listhesis of L1 on L2 and L2 on L3.  There is minimal left lateral listhesis of L3 on L4.  No anterolisthesis or retrolisthesis.     There are chronic compression deformities through the superior endplate of T12, inferior endplate of L1 and superior endplate of L2.  Degree  of vertebral body height loss is stable since 02/07/2023.  Vertebral body heights from L3-L5 are preserved.     Mild to severe degenerative disc changes are present throughout the lumbar spine, worst at the right half of L3-L4 and L4-L5.  Partially calcified disc bulges are seen at L2-L3, L3-L4, L4-L5 and L5-S1.  Evaluation for central canal narrowing is limited without intrathecal contrast.  Degrees of central canal stenosis appear worst at L3-L4 and L4-L5.     Hypertrophic facet changes are present throughout the lumbar spine, worst at L3-L4 and L4-L5.     Paraspinal soft tissues appear within normal limits.  Both SI joints appear intact with mild degenerative changes, similar compared to the prior.     Impression:     1. Multilevel degenerative disc and hypertrophic facet changes similar compared to 02/07/2023.  2. Chronic appearing compression deformities at T12, L1 and L2, similar compared to 02/07/2023.  Dense of acute injury.      01/23/20    X-Ray Lumbar Spine Ap And Lateral  FINDINGS:  Levoscoliosis present.  Vertebral body heights stable.  Alignment unchanged without spondylolisthesis.  Similar appearing multilevel degenerative disc height loss and osteophyte findings noted.  Multilevel facet degenerative findings remain.  Aorta iliac atherosclerotic calcification.  Colonic constipation findings present.    EMG 02/24/2023  IMPRESSION  1. ABNORMAL study  2. 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  There is an acute on chronic radiculopathy of the LEFT C6 nerve root and a subacute on chronic of the RIGHT C4 and C5 nerve roots and a CHRONIC radiculopathy of bilateral C4-T1 nerve roots    Review of Systems:  CONSTITUTIONAL: patient denies any fever,  "chills, or weight loss  SKIN: patient denies any rash or itching  RESPIRATORY: patient denies having any shortness of breath  GASTROINTESTINAL: patient denies having any diarrhea, constipation, or bowel incontinence  GENITOURINARY: patient denies having any abnormal bladder function    MUSCULOSKELETAL:  - patient complains of the above noted pain/s (see chief pain complaint)    NEUROLOGICAL:   - pain as above  - strength in Upper and Lower extremities is decreased, BILATERALLY  - sensation in Upper extremities is intact, BILATERALLY  - patient denies any loss of bowel or bladder control      PSYCHIATRIC: patient denies any change in mood    Other:  All other systems reviewed and are negative      Physical Exam:  /63   Pulse 96   Ht 5' 4" (1.626 m)   Wt 50.9 kg (112 lb 3.4 oz)   LMP  (LMP Unknown)   BMI 19.26 kg/m²  (reviewed on 4/29/2024)  GENERAL: Well appearing, in no acute distress, alert and oriented x3.  PSYCH:  Mood and affect appropriate.  SKIN: Skin color, texture, turgor normal, no rashes or lesions.  HEAD/FACE:  Normocephalic, bruising along right lower jaw, Cranial nerves grossly intact.  PULM: No evidence of respiratory difficulty, symmetric chest rise.  GI:  Soft and non-tender.    Neck: TTP along cervical facet joints, positive facet loading,  pain with flexion, extension, and rotation - right greater than left. ROM decreased secondary to pain. 4/5 ricky strength bilateral upper extremities.  BACK: Straight leg raising in the sitting and supine positions is negative to radicular pain. pain to palpation over the facet joints of the lumbar spine or spinous processes.  Reduced range of motion with pain reproduction.  TTP diffusely over upper and lower back  EXTREMITIES:  Peripheral joint range of motion is reduced with pain with obvious instability in bilateral lower extremities.  No deformities, edema, or skin discoloration. Good capillary refill.  MUSCULOSKELETAL:  Unable to stand on heels and " toes, patient in wheelchair today  hip, and knee provocative maneuvers are negative.  TTP over  over the sacroiliac joints bilaterally.  Distraction/Compression and  FABERs test is positive bilaterally.  Facet loading test is positive bilaterally.   Bilateral upper and lower extremity strength is normal and symmetric.  No atrophy or tone abnormalities are noted.      Shoulder: Right  - Pain on abduction: Present  - ROM:  Decreased secondary to pain  - TTP over the AC and GH joint: Present  - Neer's: Positive   - Hawkin's: Positive       NEURO: Bilateral upper and lower extremity coordination and muscle stretch reflexes are physiologic and symmetric. No loss of sensation is noted.  GAIT:  Patient presents in wheelchair today.  Antalgic gait.      Assessment:    Shireen Felix is a 78 y.o. year old female who is presenting with   Encounter Diagnoses   Name Primary?    Sacroiliac joint pain Yes    Greater trochanteric bursitis, unspecified laterality     Cervical spondylosis     Lumbar spondylosis          Plan:    1. Interventional:  Schedule for bilateral SIJ and bilateral GTB injections  Explained the risks and benefits of the procedure in detail with the patient today in clinic along with alternative treatment options, and the patient elected to pursue the intervention.        Anticoagulation:  Yes, Plavix  --secondary prophylaxis: Coronary artery disease, diastolic heart failure; cardiologist: Dr. Dye  --per [facility] guidelines, patient can continue Plavix for SIJ and GTB injections    -Of note patient has 70-80% sustained relief in neck and cervical radiculopathy following C6-7 interlaminar epidural steroid injection.  We have discussed repeating this injection in the future should symptoms exacerbate.    2. Pharmacologic:      report:  Reviewed and consistent with medication use as prescribed.      -Continue Gabapentin 300mg TID. We have reviewed potential side effects of this medication including daytime  somnolence, weight gain and peripheral edema    Patient asks for steroids today, I declined due to recent steroid taper and recent CHF. Do not advise NSAIDs due to plavix. She has been off of her tizanidine. It was advised she resume tizanidine.   Will discuss further medications with Dr. Oshea.    -Continue tizanidine 4-8 mg prn pain.We have discussed potential deleterious side effects associated with this medication including  dizziness, drowsiness, dry mouth or tingling sensation in the upper or lower extremities.       3. Rehabilitative:   -We discussed continuing physician directed at home physical therapy to help manage the patient/s painful condition. The patient was counseled that muscle strengthening will improve the long term prognosis in regards to pain and may also help increase range of motion and mobility.     4. Diagnostic:  None at this time.    5. Consults: (PRN)  - MD Melanie + Jennifer Atkins, PAC: [facility] arthritis/CTS    6.  Follow up: 4 weeks after injection    Kamilah Ford NP  Interventional Pain Medicine        The above plan and management options were discussed at length with patient. Patient is in agreement with the above and verbalized understanding.  - I discussed the goals of interventional chronic pain management with the patient on today's visit. We discussed a multimodal and systematic approach to pain.  This includes diagnostic and therapeutic injections, adjuvant pharmacologic treatment, physical therapy, and at times psychiatry.  I emphasized the importance of regular exercise, core strengthening and stretching, diet and weight loss as a cornerstone of long-term pain management.    - This condition does not require this patient to take time off of work, and the primary goal of our Pain Management services is to improve the patient's functional capacity.  - Patient Questions: Answered all of the patient's questions regarding diagnoses, therapy, treatment and next steps    Disclaimer:   This note may have been prepared using voice recognition software, it may have not been extensively proofed, as such there could be errors within the text such as sound alike errors.

## 2024-04-29 NOTE — TELEPHONE ENCOUNTER
----- Message from Eneida Godinez sent at 4/29/2024  9:50 AM CDT -----  Type:  Needs Medical Advice    Who Called: Shireen Felix ( pts brother, Rm )   Symptoms (please be specific): -   How long has patient had these symptoms:  -  Pharmacy name and phone #:  -  Would the patient rather a call back or a response via [facility]? C  Best Call Back Number: [phone]  Additional Information: needs to speak w/ PA please call

## 2024-05-01 ENCOUNTER — TELEPHONE (OUTPATIENT)
Dept: INTERNAL MEDICINE | Facility: CLINIC | Age: 79
End: 2024-05-01
Payer: MEDICARE

## 2024-05-01 ENCOUNTER — TELEPHONE (OUTPATIENT)
Dept: ALLERGY | Facility: CLINIC | Age: 79
End: 2024-05-01
Payer: MEDICARE

## 2024-05-01 NOTE — TELEPHONE ENCOUNTER
Mailbox full could not accept any messages.    ----- Message from Luli Veras sent at 5/1/2024  8:34 AM CDT -----  Contact: Rm Abdalla (pt brother) is requesting a call back he did not go into detail. Please call back @ [phone]

## 2024-05-01 NOTE — TELEPHONE ENCOUNTER
I spoke to patient's brother and they will call when Medication comes from [facility] pharmacy so we may supervise administration.

## 2024-05-02 ENCOUNTER — HOSPITAL ENCOUNTER (OUTPATIENT)
Dept: RADIOLOGY | Facility: HOSPITAL | Age: 79
Discharge: HOME | End: 2024-05-02
Attending: NURSE PRACTITIONER
Payer: MEDICARE

## 2024-05-02 ENCOUNTER — OFFICE VISIT (OUTPATIENT)
Dept: INTERNAL MEDICINE | Facility: CLINIC | Age: 79
End: 2024-05-02
Payer: MEDICARE

## 2024-05-02 ENCOUNTER — NURSE TRIAGE (OUTPATIENT)
Dept: ADMINISTRATIVE | Facility: CLINIC | Age: 79
End: 2024-05-02
Payer: MEDICARE

## 2024-05-02 VITALS
BODY MASS INDEX: 19.15 KG/M2 | TEMPERATURE: 98 F | HEART RATE: 107 BPM | SYSTOLIC BLOOD PRESSURE: 112 MMHG | DIASTOLIC BLOOD PRESSURE: 78 MMHG | OXYGEN SATURATION: 99 % | WEIGHT: 112.19 LBS | HEIGHT: 64 IN | RESPIRATION RATE: 17 BRPM

## 2024-05-02 DIAGNOSIS — I10 PRIMARY HYPERTENSION: ICD-10-CM

## 2024-05-02 DIAGNOSIS — R05.2 SUBACUTE COUGH: ICD-10-CM

## 2024-05-02 DIAGNOSIS — R29.818 OTHER SYMPTOMS AND SIGNS INVOLVING THE NERVOUS SYSTEM: ICD-10-CM

## 2024-05-02 DIAGNOSIS — J41.1 MUCOPURULENT CHRONIC BRONCHITIS: Chronic | ICD-10-CM

## 2024-05-02 DIAGNOSIS — Z98.890 HISTORY OF CEREBRAL ANEURYSM REPAIR: ICD-10-CM

## 2024-05-02 DIAGNOSIS — J84.10 PULMONARY INTERSTITIAL FIBROSIS: Primary | ICD-10-CM

## 2024-05-02 DIAGNOSIS — I50.32 CHRONIC DIASTOLIC HEART FAILURE: ICD-10-CM

## 2024-05-02 DIAGNOSIS — E11.22 CONTROLLED TYPE 2 DIABETES MELLITUS WITH STAGE 3 CHRONIC KIDNEY DISEASE, WITHOUT LONG-TERM CURRENT USE OF INSULIN: ICD-10-CM

## 2024-05-02 DIAGNOSIS — Z86.79 HISTORY OF CEREBRAL ANEURYSM REPAIR: ICD-10-CM

## 2024-05-02 DIAGNOSIS — M48.062 SPINAL STENOSIS OF LUMBAR REGION WITH NEUROGENIC CLAUDICATION: ICD-10-CM

## 2024-05-02 DIAGNOSIS — R55 SYNCOPE AND COLLAPSE: ICD-10-CM

## 2024-05-02 DIAGNOSIS — N18.30 CONTROLLED TYPE 2 DIABETES MELLITUS WITH STAGE 3 CHRONIC KIDNEY DISEASE, WITHOUT LONG-TERM CURRENT USE OF INSULIN: ICD-10-CM

## 2024-05-02 DIAGNOSIS — R06.02 SOB (SHORTNESS OF BREATH): ICD-10-CM

## 2024-05-02 DIAGNOSIS — M54.12 CERVICAL RADICULOPATHY: ICD-10-CM

## 2024-05-02 DIAGNOSIS — J84.10 PULMONARY INTERSTITIAL FIBROSIS: ICD-10-CM

## 2024-05-02 PROCEDURE — 70498 CT ANGIOGRAPHY NECK: CPT | Mod: 26,,, | Performed by: STUDENT IN AN ORGANIZED HEALTH CARE EDUCATION/TRAINING PROGRAM

## 2024-05-02 PROCEDURE — 3074F SYST BP LT 130 MM HG: CPT | Mod: CPTII,S$GLB,, | Performed by: PEDIATRICS

## 2024-05-02 PROCEDURE — 1160F RVW MEDS BY RX/DR IN RCRD: CPT | Mod: CPTII,S$GLB,, | Performed by: PEDIATRICS

## 2024-05-02 PROCEDURE — 99999 PR PBB SHADOW E&M-EST. PATIENT-LVL V: CPT | Mod: PBBFAC,,, | Performed by: PEDIATRICS

## 2024-05-02 PROCEDURE — 70496 CT ANGIOGRAPHY HEAD: CPT | Mod: TC

## 2024-05-02 PROCEDURE — 3288F FALL RISK ASSESSMENT DOCD: CPT | Mod: CPTII,S$GLB,, | Performed by: PEDIATRICS

## 2024-05-02 PROCEDURE — 99214 OFFICE O/P EST MOD 30 MIN: CPT | Mod: S$GLB,,, | Performed by: PEDIATRICS

## 2024-05-02 PROCEDURE — 71250 CT THORAX DX C-: CPT | Mod: TC

## 2024-05-02 PROCEDURE — 25500020 PHARM REV CODE 255: Performed by: NURSE PRACTITIONER

## 2024-05-02 PROCEDURE — 71250 CT THORAX DX C-: CPT | Mod: 26,,, | Performed by: RADIOLOGY

## 2024-05-02 PROCEDURE — 70496 CT ANGIOGRAPHY HEAD: CPT | Mod: 26,,, | Performed by: STUDENT IN AN ORGANIZED HEALTH CARE EDUCATION/TRAINING PROGRAM

## 2024-05-02 PROCEDURE — 3078F DIAST BP <80 MM HG: CPT | Mod: CPTII,S$GLB,, | Performed by: PEDIATRICS

## 2024-05-02 PROCEDURE — 1159F MED LIST DOCD IN RCRD: CPT | Mod: CPTII,S$GLB,, | Performed by: PEDIATRICS

## 2024-05-02 PROCEDURE — 3072F LOW RISK FOR RETINOPATHY: CPT | Mod: CPTII,S$GLB,, | Performed by: PEDIATRICS

## 2024-05-02 PROCEDURE — 1101F PT FALLS ASSESS-DOCD LE1/YR: CPT | Mod: CPTII,S$GLB,, | Performed by: PEDIATRICS

## 2024-05-02 PROCEDURE — 1125F AMNT PAIN NOTED PAIN PRSNT: CPT | Mod: CPTII,S$GLB,, | Performed by: PEDIATRICS

## 2024-05-02 RX ORDER — SACUBITRIL AND VALSARTAN 24; 26 MG/1; MG/1
1 TABLET, FILM COATED ORAL 2 TIMES DAILY
Qty: 180 TABLET | Refills: 3 | Status: SHIPPED | OUTPATIENT
Start: 2024-05-02

## 2024-05-02 RX ORDER — SPIRONOLACTONE 25 MG/1
25 TABLET ORAL DAILY
Qty: 90 TABLET | Refills: 3 | Status: SHIPPED | OUTPATIENT
Start: 2024-05-02 | End: 2024-05-03 | Stop reason: SDUPTHER

## 2024-05-02 RX ADMIN — IOHEXOL 75 ML: 350 INJECTION, SOLUTION INTRAVENOUS at 09:05

## 2024-05-02 NOTE — PROGRESS NOTES
CTA HEAD AND NECK:      05-    No acute intracranial abnormality.  Mild nonspecific white matter changes likely related to chronic microvascular ischemia.    Left MCA aneurysm clip appears unchanged with no evidence of residual or recurrent aneurysm filling.    Otherwise, no significant stenosis, occlusion, dissection, aneurysm, or vascular malformation.

## 2024-05-02 NOTE — PROGRESS NOTES
Patient ID: Shieren Felix is a 78 y.o. female.    Chief Complaint: Hospital Follow Up    History of Present Illness    CHIEF COMPLAINT:  Patient reports feeling poorly for about three weeks with symptoms of shortness of breath and congestion.    This is her [facility] d/c summary from 4/24- 26/24:    Reason for Hospitalization   This is a 78-year-old white female with CHF and pulmonary fibrosis, not on home oxygen who just finished a prolonged course of steroids as an outpatient.  She presented with cough and shortness of breath.  No new infiltrates were seen on imaging.  White blood cell count was normal.  BNP was elevated..  She was initiated on diuretic therapy and placed in observation.     * Acute on chronic combined systolic and diastolic CHF (congestive heart failure) (HCC)  patient presented with some breath.  He was difficult to determine if her symptoms were related to her pulmonary fibrosis or CHF.  She improved with Lasix but did not appear to be significantly volume overloaded.  Echo confirmed her ejection fraction at 35%.  She was not on a CHF regimen.  I have initiated therapy with Entresto, Aldactone, Jardiance and continued her beta-blocker.  Renal function improved and blood pressure tolerated initiation of Entresto and Jardiance        Pulmonary fibrosis (HCC)  though patient was not hypoxic, her lung exam and imaging consistent with severe pulmonary fibrosis.  Her established pulmonologist is leaving and she was not on definitive therapy.  Will refer her to pulmonary per her request    INTERSTITIAL FIBROSIS MANAGEMENT:  Patient was seen by a lung specialist in March 2020 for severe symptoms related to her chronic undifferentiated interstitial fibrosis and has a plan for transfer to a new lung specialist due to her current specialist's upcoming departure.    GOUT AND DIABETES MANAGEMENT:  She has recurrent gout exacerbations causing discomfort. She manages her diabetes with an insulin  regimen, supported by Jardiance andEntresto 24/26 for additional heart benefits. Her monitored blood sugar remain within the range of 99 to 112.    FUTURE APPOINTMENTS:  She has future appointments scheduled with her cardiologist, Dr. Dye and pulmonologist, Dr. Ms. Fishman.    LIFESTYLE:  She adheres to her medication regimen, exercises regularly with walking as a part of her daily routine, and maintains a healthy diet with abundance of fruits, vegetables, lean proteins, and a low salt intake. She has been living smoke-free for about 20 years.    PMH, PSH, SH, FH reviewed with patient.    ROS:  General: -fever, -chills, -fatigue, -weight gain, -weight loss  Eyes: -vision changes, -redness, -discharge  ENT: -ear pain, +nasal congestion, -sore throat  Cardiovascular: -chest pain, -palpitations, -lower extremity edema  Respiratory: -cough, +shortness of breath  Gastrointestinal: -abdominal pain, -nausea, -vomiting, -diarrhea, -constipation, -blood in stool  Genitourinary: -dysuria, -hematuria, -frequency  Musculoskeletal: +joint pain, -muscle pain  Skin: -rash, -lesion  Neurological: -headache, -dizziness, -numbness, -tingling  Psychiatric: -anxiety, -depression, -sleep difficulty         Exam:  Physical Exam    General: No acute distress. Well-developed. Well-nourished.  Eyes: EOMI. Sclerae anicteric.  HENT: Normocephalic. Atraumatic. Nares patent. Moist oral mucosa.  Cardiovascular: Regular rate. Regular rhythm. No murmurs. No rubs. No gallops. Normal S1, S2.  Respiratory: Normal respiratory effort. Clear to auscultation bilaterally. No rhonchi. No wheezing. Crackles in the posterior lower lung fields.  Abdomen: Soft. Non-tender. Non-distended. Normoactive bowel sounds.  Musculoskeletal: No  obvious deformity.  Extremities: No lower extremity edema.  Neurological: Alert & oriented x3. No slurred speech. Normal gait.  Psychiatric: Normal mood. Normal affect. Good insight. Good judgment.  Skin: Warm. Dry. No rash.          Assessment/Plan:  Pulmonary interstitial fibrosis    Primary hypertension    Controlled type 2 diabetes mellitus with stage 3 chronic kidney disease, without long-term current use of insulin  -     Hemoglobin A1C; Future; Expected date: 05/02/2024  -     Lipid Panel; Future; Expected date: 05/02/2024  -     Microalbumin/Creatinine Ratio, Urine; Future; Expected date: 05/02/2024    Mucopurulent chronic bronchitis    Chronic diastolic heart failure  -     Basic Metabolic Panel; Future; Expected date: 05/02/2024  -     CBC Auto Differential; Future; Expected date: 05/02/2024  -     B-TYPE NATRIURETIC PEPTIDE; Future; Expected date: 05/02/2024    Other orders  -     sacubitriL-valsartan (ENTRESTO) 24-26 mg per tablet; Take 1 tablet by mouth 2 (two) times daily.  Dispense: 180 tablet; Refill: 3  -     spironolactone (ALDACTONE) 25 MG tablet; Take 1 tablet (25 mg total) by mouth once daily.  Dispense: 90 tablet; Refill: 3  -     empagliflozin (JARDIANCE) 10 mg tablet; Take 1 tablet (10 mg total) by mouth once daily.  Dispense: 90 tablet; Refill: 3         Assessment & Plan    J42 Unspecified chronic bronchitis  J84.178 Other interstitial pulmonary diseases with fibrosis in diseases classified elsewhere  I50.9 Heart failure, unspecified  E11.9 Type 2 diabetes mellitus without complications  CHRONIC UNDIFFERENTIATED INTERSTITIAL FIBROSIS:  - Will maintain the patient's appointment with Ms. Fishman, a lung specialist, for a discussion on chronic undifferentiated interstitial fibrosis and potential experimental treatments.  - Mentioned an existing appointment with an external pulmonologist, Eyal Morataya, but offered the patient the option to cancel if they prefer to continue with the current practice's pulmonologists.  - Educated the patient about their chronic undifferentiated interstitial fibrosis, explaining its typically irreversible nature and that treatments can only aim to stall its progression.  - Scheduled a  follow-up visit for early June to discuss actions taken by Dr. Dye and Ms. Fishman, ensuring no loose ends.  CARDIAC ISSUES:  - Referred the patient to Dr. Dye, a cardiologist, for a consultation on May 9th.  - Prescribed Entresto 24/26 and introduced Jardiance, a diabetic medicine with heart protection benefits, not for high sugar levels but primarily for its cardiac benefits.  - Explained the benefits of Jardiance, emphasizing its heart protection benefits.  - Stressed the importance of timely medication, maintaining controlled blood sugar and blood pressure levels, and abstaining from smoking.  KIDNEY FUNCTION AND GENERAL HEALTH:  - Ordered a repeat of blood count and chemistries to monitor kidney function, following a recent change in medications.  - Conducted a urine test and checked cholesterol levels.  - Advised the patient to adhere to a healthy diet rich in fruits and vegetables, lean meats, and low-fat foods, while limiting salt and fried food intake.          Visit today included increased complexity associated with the care of the episodic problem  addressed and managing the longitudinal care of the patient due to the serious and/or complex managed problem(s) .      Follow up in about 4 weeks (around 5/30/2024).    This note was generated with the assistance of ambient listening technology. Verbal consent was obtained by the patient and accompanying visitor(s) for the recording of patient appointment to facilitate this note. I attest to having reviewed and edited the generated note for accuracy, though some syntax or spelling errors may persist. Please contact the author of this note for any clarification.        Transitional Care Note    Family and/or Caretaker present at visit?  Yes.  Diagnostic tests reviewed/disposition: I have reviewed all completed as well as pending diagnostic tests at the time of discharge.  Disease/illness education: given  Home health/community services discussion/referrals:  Patient has home health established at upcoming date .   Establishment or re-establishment of referral orders for community resources:  COA for community support .   Discussion with other health care providers:  subspecialty care in place .

## 2024-05-03 ENCOUNTER — OFFICE VISIT (OUTPATIENT)
Dept: CARDIOLOGY | Facility: CLINIC | Age: 79
End: 2024-05-03
Payer: MEDICARE

## 2024-05-03 ENCOUNTER — TELEPHONE (OUTPATIENT)
Dept: CARDIOLOGY | Facility: CLINIC | Age: 79
End: 2024-05-03
Payer: MEDICARE

## 2024-05-03 ENCOUNTER — TELEPHONE (OUTPATIENT)
Dept: NEUROLOGY | Facility: CLINIC | Age: 79
End: 2024-05-03
Payer: MEDICARE

## 2024-05-03 VITALS
DIASTOLIC BLOOD PRESSURE: 84 MMHG | WEIGHT: 106.06 LBS | OXYGEN SATURATION: 91 % | SYSTOLIC BLOOD PRESSURE: 132 MMHG | HEART RATE: 104 BPM | BODY MASS INDEX: 18.2 KG/M2

## 2024-05-03 DIAGNOSIS — R00.2 PALPITATIONS: ICD-10-CM

## 2024-05-03 DIAGNOSIS — I10 PRIMARY HYPERTENSION: Primary | ICD-10-CM

## 2024-05-03 DIAGNOSIS — I95.1 ORTHOSTATIC HYPOTENSION: ICD-10-CM

## 2024-05-03 DIAGNOSIS — I50.43 ACUTE ON CHRONIC COMBINED SYSTOLIC AND DIASTOLIC CONGESTIVE HEART FAILURE: Primary | ICD-10-CM

## 2024-05-03 DIAGNOSIS — I10 PRIMARY HYPERTENSION: ICD-10-CM

## 2024-05-03 DIAGNOSIS — Z87.891 HISTORY OF TOBACCO ABUSE: ICD-10-CM

## 2024-05-03 DIAGNOSIS — I25.118 CORONARY ARTERY DISEASE OF NATIVE ARTERY OF NATIVE HEART WITH STABLE ANGINA PECTORIS: ICD-10-CM

## 2024-05-03 DIAGNOSIS — Z98.890 HISTORY OF CEREBRAL ANEURYSM REPAIR: ICD-10-CM

## 2024-05-03 DIAGNOSIS — Z86.79 HISTORY OF CEREBRAL ANEURYSM: ICD-10-CM

## 2024-05-03 DIAGNOSIS — E86.0 DEHYDRATION: ICD-10-CM

## 2024-05-03 DIAGNOSIS — R55 SYNCOPE AND COLLAPSE: Primary | ICD-10-CM

## 2024-05-03 DIAGNOSIS — E83.42 HYPOMAGNESEMIA: ICD-10-CM

## 2024-05-03 DIAGNOSIS — I50.32 CHRONIC DIASTOLIC HEART FAILURE: ICD-10-CM

## 2024-05-03 DIAGNOSIS — I74.9 EMBOLISM AND THROMBOSIS OF UNSPECIFIED ARTERY: ICD-10-CM

## 2024-05-03 DIAGNOSIS — E78.2 MIXED HYPERLIPIDEMIA: ICD-10-CM

## 2024-05-03 DIAGNOSIS — Z86.79 HISTORY OF CEREBRAL ANEURYSM REPAIR: ICD-10-CM

## 2024-05-03 DIAGNOSIS — E87.6 HYPOKALEMIA: ICD-10-CM

## 2024-05-03 DIAGNOSIS — D50.9 IRON DEFICIENCY ANEMIA, UNSPECIFIED IRON DEFICIENCY ANEMIA TYPE: ICD-10-CM

## 2024-05-03 PROCEDURE — 3079F DIAST BP 80-89 MM HG: CPT | Mod: CPTII,S$GLB,, | Performed by: INTERNAL MEDICINE

## 2024-05-03 PROCEDURE — G0180 MD CERTIFICATION HHA PATIENT: HCPCS | Mod: ,,, | Performed by: PEDIATRICS

## 2024-05-03 PROCEDURE — 1160F RVW MEDS BY RX/DR IN RCRD: CPT | Mod: CPTII,S$GLB,, | Performed by: INTERNAL MEDICINE

## 2024-05-03 PROCEDURE — 1126F AMNT PAIN NOTED NONE PRSNT: CPT | Mod: CPTII,S$GLB,, | Performed by: INTERNAL MEDICINE

## 2024-05-03 PROCEDURE — 1159F MED LIST DOCD IN RCRD: CPT | Mod: CPTII,S$GLB,, | Performed by: INTERNAL MEDICINE

## 2024-05-03 PROCEDURE — 3075F SYST BP GE 130 - 139MM HG: CPT | Mod: CPTII,S$GLB,, | Performed by: INTERNAL MEDICINE

## 2024-05-03 PROCEDURE — 99999 PR PBB SHADOW E&M-EST. PATIENT-LVL V: CPT | Mod: PBBFAC,,, | Performed by: INTERNAL MEDICINE

## 2024-05-03 PROCEDURE — 3288F FALL RISK ASSESSMENT DOCD: CPT | Mod: CPTII,S$GLB,, | Performed by: INTERNAL MEDICINE

## 2024-05-03 PROCEDURE — 1101F PT FALLS ASSESS-DOCD LE1/YR: CPT | Mod: CPTII,S$GLB,, | Performed by: INTERNAL MEDICINE

## 2024-05-03 PROCEDURE — G2211 COMPLEX E/M VISIT ADD ON: HCPCS | Mod: S$GLB,,, | Performed by: INTERNAL MEDICINE

## 2024-05-03 PROCEDURE — 3072F LOW RISK FOR RETINOPATHY: CPT | Mod: CPTII,S$GLB,, | Performed by: INTERNAL MEDICINE

## 2024-05-03 PROCEDURE — 99215 OFFICE O/P EST HI 40 MIN: CPT | Mod: S$GLB,,, | Performed by: INTERNAL MEDICINE

## 2024-05-03 RX ORDER — FUROSEMIDE 40 MG/1
40 TABLET ORAL DAILY PRN
Qty: 30 TABLET | Refills: 3 | Status: SHIPPED | OUTPATIENT
Start: 2024-05-03 | End: 2024-05-08

## 2024-05-03 RX ORDER — SPIRONOLACTONE 50 MG/1
50 TABLET, FILM COATED ORAL DAILY
Qty: 90 TABLET | Refills: 1 | Status: SHIPPED | OUTPATIENT
Start: 2024-05-03 | End: 2025-05-03

## 2024-05-03 RX ORDER — RANOLAZINE 1000 MG/1
1000 TABLET, EXTENDED RELEASE ORAL 2 TIMES DAILY
Qty: 180 TABLET | Refills: 3 | Status: SHIPPED | OUTPATIENT
Start: 2024-05-03

## 2024-05-03 RX ORDER — LEVETIRACETAM 750 MG/1
750 TABLET ORAL 2 TIMES DAILY
Qty: 60 TABLET | Refills: 11 | Status: SHIPPED | OUTPATIENT
Start: 2024-05-03 | End: 2025-05-03

## 2024-05-03 RX ORDER — DILTIAZEM HYDROCHLORIDE 120 MG/1
120 CAPSULE, COATED, EXTENDED RELEASE ORAL DAILY
Qty: 90 CAPSULE | Refills: 1 | Status: SHIPPED | OUTPATIENT
Start: 2024-05-03 | End: 2024-05-23

## 2024-05-03 RX ORDER — CARVEDILOL 12.5 MG/1
12.5 TABLET ORAL 2 TIMES DAILY WITH MEALS
Qty: 180 TABLET | Refills: 2 | Status: SHIPPED | OUTPATIENT
Start: 2024-05-03 | End: 2024-05-23

## 2024-05-03 NOTE — PROGRESS NOTES
Subjective:   Patient ID:  Shireen Felix is a 78 y.o. female who presents for cardiac consult of Hospital Follow Up (Joselyn from [facility] saw pt this morning also and called stated pt blood pressure read 150/70 heart rate 112, respiration 22, oxygen 100% from last night to 05/03 feeling worse. Pt is coughing a lot more and more fatigue. BNP was 687 /) and Fatigue      The patient came in today for cardiac consult of Hospital Follow Up (Joselyn from [facility] saw pt this morning also and called stated pt blood pressure read 150/70 heart rate 112, respiration 22, oxygen 100% from last night to 05/03 feeling worse. Pt is coughing a lot more and more fatigue. BNP was 687 /) and Fatigue      Shireen Felix is a 78 y.o. female pt with HFPEF, CAD, CVI, prior CVA, COPD, seizure disorder, DM type II, HTN, and ANDREI here for follow up CV eval.        BP and Hr stable. BMI 16 - 107 lbs. Pt has seen Indira since last visit.   She had low BPs had Salmonella   Nuclear stress neg 6/2023. ECHO 6/2/2023 with normal bi V function.     5/3/24 - hosp follow up     April 2024 Reason for Hospitalization   This is a 78-year-old white female with CHF and pulmonary fibrosis, not on home oxygen who just finished a prolonged course of steroids as an outpatient. She presented with cough and shortness of breath. No new infiltrates were seen on imaging. White blood cell count was normal. BNP was elevated.. She was initiated on diuretic therapy and placed in observation.  * Acute on chronic combined systolic and diastolic CHF (congestive heart failure) (HCC)  patient presented with some breath. He was difficult to determine if her symptoms were related to her pulmonary fibrosis or CHF. She improved with Lasix but did not appear to be significantly volume overloaded. Echo confirmed her ejection fraction at 35%. She was not on a CHF regimen. I have initiated therapy with Entresto, Aldactone, Jardiance and continued her  beta-blocker. Renal function improved and blood pressure tolerated initiation of Entresto and Jardiance  Pulmonary fibrosis (HCC)  though patient was not hypoxic, her lung exam and imaging consistent with severe pulmonary fibrosis. Her established pulmonologist is leaving and she was not on definitive therapy. Will refer her to pulmonary per her request      ECHO 4/2024 with LVEF 35-40%, normal RV function, mild MR, OLIVIA 1.3 cm^2, mild OR.     Pt admitted last month to [facility] for acute on chronic CHF.    Hospital Follow Up (Joselyn from [facility] saw pt this morning also and called stated pt blood pressure read 150/70 heart rate 112, respiration 22, oxygen 100% from last night to 05/03 feeling worse. Pt is coughing a lot more and more fatigue. BNP was 687 /) and Fatigue    BP today 130s/80s. Hr 104. BMI 18  - 106 lbs               Component Ref Range & Units 1 d ago  (5/2/24) 8 d ago  (4/25/24) 9 d ago  (4/24/24) 11 d ago  (4/22/24) 5 mo ago  (11/25/23) 11 mo ago  (5/22/23) 11 mo ago  (5/13/23)   BNP 0 - 99 pg/mL 687 High  1,495 High  R 1,323 High  R 427 High  CM             Her BNP has improved, she still feels SOB.       -----------------------     4/2024 CONCLUSIONS:   1. Normal left ventricular cavity size. Moderately depressed left ventricular systolic   function. LVEF 35 - 40%.   2. Normal right ventricular size and systolic function.   3. The left atrium is normal in size.   4. Normal right atrial size and morphology.   5. Mild mitral valve regurgitation. Mitral valve leaflets appear mildly thickened.   6. Aortic valve cusps appear mildly calcified. AV peak PG 13 mmHg. AV Mean PG 7 mmHg. OLIVIA   (VTI) 1.38 cm2.   7. No or trivial tricuspid valve regurgitation.   8. Mild pulmonic valve regurgitation.   9. Normal pericardium without evidence of pericardial effusion.   10. There is mild atherosclerosis visualized in the abdominal aorta by limited views.     Results for orders placed during the hospital  encounter of 06/02/23    Echo    Interpretation Summary  · The left ventricle is normal in size with concentric hypertrophy and low normal systolic function.  · The estimated ejection fraction is 50%.  · Normal left ventricular diastolic function.  · There is abnormal septal wall motion consistent with left bundle branch block.  · Normal right ventricular size with normal right ventricular systolic function.  · Normal central venous pressure (3 mmHg).  · The estimated PA systolic pressure is 30 mmHg.  · Mild mitral regurgitation.    Results for orders placed during the hospital encounter of 06/02/23    Nuclear Stress - Cardiology Interpreted    Interpretation Summary    Normal myocardial perfusion scan. There is no evidence of myocardial ischemia or infarction.    The gated perfusion images showed an ejection fraction of 54% at rest. The gated perfusion images showed an ejection fraction of 62% post stress.    There is normal wall motion at rest and post stress.    LV cavity size is normal at rest and normal at stress.    The ECG portion of the study is negative for ischemia.    The patient reported no chest pain during the stress test.      HOLTER  Sinus rhythm with heart rates varying between 75 and 136 BPM with an average of 95 BPM.     Maximum heart rate recorded at: 13:43 CDT on day 2.     Minimum heart rate recorded at 02:25 CDT on day 1.   PVC    Ventricular Arrhythmias  There were rare PVCs totalling 510 and averaging 5.38 per hour. There were 6 couplets. There were 9 triplets.   PAC    Supraventricular Arrhythmias  There were very rare PACs totalling 37 and averaging 0.39 per hour.             Past Medical History:   Diagnosis Date    Abnormal ECG 8/5/2019    Aneurysm     Anticoagulant long-term use     Arthritis     CAD in native artery 8/5/2019    COPD (chronic obstructive pulmonary disease)     Diabetes mellitus     Fall 10/10/2019    Formatting of this note might be different from the original. Found  sitting on floor next to bed last night Mild confusion today Does not recall falling or how she ended up on floor UA today Labs yesterday unremarkable    Glaucoma     Hypertension     Seizures     Shingles 05/27/2017    Stroke        Past Surgical History:   Procedure Laterality Date    BRAIN SURGERY      CARDIAC CATHETERIZATION      COLONOSCOPY N/A 09/21/2023    Procedure: COLONOSCOPY;  Surgeon: Joanna Leal MD;  Location: [facility] ENDO;  Service: Endoscopy;  Laterality: N/A;    CORONARY ANGIOPLASTY      EPIDURAL STEROID INJECTION INTO CERVICAL SPINE N/A 2/7/2024    Procedure: Cervical IL XANDER, Level C6/7, RN IV sedation;  Surgeon: Francisco Oshea MD;  Location: [facility] PAIN MGT;  Service: Pain Management;  Laterality: N/A;    EPIDURAL STEROID INJECTION INTO LUMBAR SPINE Bilateral 11/12/2019    Procedure: TF XANDER L4/5;  Surgeon: Desean Dias MD;  Location: [facility] PAIN MGT;  Service: Pain Management;  Laterality: Bilateral;    HYSTERECTOMY      INJECTION OF ANESTHETIC AGENT AROUND MEDIAL BRANCH NERVES INNERVATING LUMBAR FACET JOINT Bilateral 01/31/2020    Procedure: Bilateral L3-5 MBB;  Surgeon: Desean Dias MD;  Location: [facility] PAIN MGT;  Service: Pain Management;  Laterality: Bilateral;    INJECTION OF ANESTHETIC AGENT INTO SACROILIAC JOINT Right 11/16/2021    Procedure: Right BLOCK, SACROILIAC JOINT Right GTB with RN IV sedation;  Surgeon: Yao Fulton MD;  Location: [facility] PAIN MGT;  Service: Pain Management;  Laterality: Right;    INJECTION OF ANESTHETIC AGENT INTO SACROILIAC JOINT Bilateral 07/28/2023    Procedure: Bilateral GT bursa + bilateral SIJ injection;  Surgeon: Francisco Oshea MD;  Location: [facility] PAIN MGT;  Service: Pain Management;  Laterality: Bilateral;    INJECTION OF JOINT Bilateral 07/09/2020    Procedure: Bilateral shoulder GH Joint injection with local;  Surgeon: Desean Dias MD;  Location: [facility] PAIN MGT;  Service: Pain Management;  Laterality: Bilateral;    INJECTION OF JOINT Bilateral  2023    Procedure: Bilateral GT bursa + bilateral SIJ injection NEEDS ADDITIONAL SEDATION AND MORE TIME BEFORE INJECTION RN IV Sedation;  Surgeon: Francisco Oshea MD;  Location: [facility] PAIN MGT;  Service: Pain Management;  Laterality: Bilateral;    INJECTION OF JOINT N/A 10/25/2023    Procedure: Sacrococcygeal joint injection;  Surgeon: Francisco Oshea MD;  Location: [facility] PAIN MGT;  Service: Pain Management;  Laterality: N/A;    OOPHORECTOMY      SELECTIVE INJECTION OF ANESTHETIC AGENT AROUND LUMBAR SPINAL NERVE ROOT BY TRANSFORAMINAL APPROACH Bilateral 2023    Procedure: Bilateral L4/5 TF XANDER RN IV Sedation;  Surgeon: Francisco Oshea MD;  Location: [facility] PAIN MGT;  Service: Pain Management;  Laterality: Bilateral;    TRANSFORAMINAL EPIDURAL INJECTION OF STEROID Bilateral 2019    Procedure: Bilateral L3/4 Transforaminal Epidural Steroid Injection;  Surgeon: Desean Dias MD;  Location: [facility] PAIN MGT;  Service: Pain Management;  Laterality: Bilateral;    TRANSFORAMINAL EPIDURAL INJECTION OF STEROID Bilateral 03/10/2020    Procedure: Right T12/L1 TF XANDER with local;  Surgeon: Desean Dias MD;  Location: [facility] PAIN MGT;  Service: Pain Management;  Laterality: Bilateral;    TRANSFORAMINAL EPIDURAL INJECTION OF STEROID Right 2020    Procedure: Right T12/L1 TFESI Covid day of procedure;  Surgeon: Desean Dias MD;  Location: [facility] PAIN MGT;  Service: Pain Management;  Laterality: Right;       Social History     Tobacco Use    Smoking status: Former     Current packs/day: 0.00     Average packs/day: 1 pack/day for 42.3 years (42.3 ttl pk-yrs)     Types: Cigarettes     Start date:      Quit date: 2004     Years since quittin.0    Smokeless tobacco: Former   Substance Use Topics    Alcohol use: No    Drug use: Never       Family History   Problem Relation Name Age of Onset    No Known Problems Mother      No Known Problems Father      Breast cancer Maternal Aunt      Breast cancer  Maternal Aunt      Breast cancer Maternal Aunt         Patient's Medications   New Prescriptions    CARVEDILOL (COREG) 12.5 MG TABLET    Take 1 tablet (12.5 mg total) by mouth 2 (two) times daily with meals.    FUROSEMIDE (LASIX) 40 MG TABLET    Take 1 tablet (40 mg total) by mouth daily as needed (for edema, swelling, fluid build up, or 3 pound weight gain in 24 hours).   Previous Medications    ALBUTEROL (PROVENTIL) 2.5 MG /3 ML (0.083 %) NEBULIZER SOLUTION    EMPTY 1 VIAL INTO NEBULIZER EVERY 4 HOURS AS NEEDED FRO RESCUE    ALBUTEROL (PROVENTIL/VENTOLIN HFA) 90 MCG/ACTUATION INHALER    INHALE TWO PUFFS INTO THE LUNGS EVERY 4 HOURS AS NEEDED    ALBUTEROL-BUDESONIDE (AIRSUPRA) 90-80 MCG/ACTUATION    Inhale 2 puffs into the lungs every 4 (four) hours as needed (wheezing, cough).    APTIOM 400 MG TAB TABLET    Take 400 mg by mouth.    ATORVASTATIN (LIPITOR) 40 MG TABLET    TAKE 1 TABLET BY MOUTH ONCE DAILY    CHOLECALCIFEROL, VITAMIN D3, 1,250 MCG (50,000 UNIT) CAPSULE    Take 1 capsule (50,000 Units total) by mouth every 7 days.    CHOLESTYRAMINE (QUESTRAN) 4 GRAM PACKET    TAKE 1 PACKET BY MOUTH TWICE DAILY AS DIRECTED    CLOPIDOGREL (PLAVIX) 75 MG TABLET    Take 1 tablet (75 mg total) by mouth once daily.    DENOSUMAB (PROLIA) 60 MG/ML SYRG    every 6 (six) months.    DICYCLOMINE (BENTYL) 10 MG CAPSULE    Take 1 capsule (10 mg total) by mouth 2 (two) times daily.    DORZOLAMIDE-TIMOLOL 2-0.5% (COSOPT) 22.3-6.8 MG/ML OPHTHALMIC SOLUTION    Place 1 drop into both eyes every 12 (twelve) hours.    EMPAGLIFLOZIN (JARDIANCE) 10 MG TABLET    Take 1 tablet (10 mg total) by mouth once daily.    ESCITALOPRAM OXALATE (LEXAPRO) 20 MG TABLET    TAKE 1 TABLET BY MOUTH ONCE DAILY    FLUCONAZOLE (DIFLUCAN) 150 MG TAB    Take 1 tablet (150 mg total) by mouth once daily.    FLUOCINONIDE (LIDEX) 0.05 % EXTERNAL SOLUTION    Apply topically 2 (two) times daily. Use on itchy spots on scalp and ear    FLUTICASONE PROPIONATE (FLONASE) 50  MCG/ACTUATION NASAL SPRAY    USE 2 SPRAYS INTO EACH NOSTRIL ONCE A DAY AT 6AM AS DIRECTED    FLUTICASONE-UMECLIDIN-VILANTER (TRELEGY ELLIPTA) 200-62.5-25 MCG INHALER    INHALE 1 PUFF INTO THE LUNGS DAILY    GABAPENTIN (NEURONTIN) 300 MG CAPSULE    Take 1 capsule (300 mg total) by mouth 3 (three) times daily.    HYDROCODONE-ACETAMINOPHEN (NORCO) 5-325 MG PER TABLET    Take 1 tablet by mouth every 6 (six) hours as needed for Pain.    HYDROXYZINE HCL (ATARAX) 25 MG TABLET    TAKE 1 TABLET BY MOUTH EVERY EVENING    IBUPROFEN (ADVIL,MOTRIN) 800 MG TABLET    Take 1 tablet (800 mg total) by mouth 3 (three) times daily.    IPRATROPIUM (ATROVENT) 42 MCG (0.06 %) NASAL SPRAY    USE 2 SPRAYS INTO EACH NOSTRIL FOUR TIMES DAILY    ISOSORBIDE MONONITRATE (IMDUR) 60 MG 24 HR TABLET    Take 1 tablet (60 mg total) by mouth every evening.    KETOCONAZOLE (NIZORAL) 2 % CREAM    Apply topically 2 (two) times daily. For dry flaky patches of ear.    LATANOPROST 0.005 % OPHTHALMIC SOLUTION    Place 1 drop into both eyes every evening.    LEVETIRACETAM (KEPPRA) 750 MG TAB    Take 1 tablet (750 mg total) by mouth 2 (two) times daily.    LINEZOLID (ZYVOX) 600 MG TAB    Take 1 tablet (600 mg total) by mouth every 12 (twelve) hours.    MAGNESIUM OXIDE (MAG-OX) 400 MG (241.3 MG MAGNESIUM) TABLET    Take 1 tablet (400 mg total) by mouth once daily.    MONTELUKAST (SINGULAIR) 10 MG TABLET    Take 1 tablet (10 mg total) by mouth once daily.    MONTELUKAST (SINGULAIR) 10 MG TABLET    Take 1 tablet (10 mg total) by mouth once daily.    MYRBETRIQ 50 MG TB24    TAKE 1 TABLET BY MOUTH ONCE DAILY    NITROGLYCERIN (NITROSTAT) 0.4 MG SL TABLET    DISSOLVE 1 TABLET UNDER TONGUE EVERY 5 MINUTES FOR CHEST PAIN (MAY TAKE UP TO 3 DOSES THEN SEEK MEDICAL ATTENTION)    OMEPRAZOLE (PRILOSEC) 40 MG CAPSULE    Take 40 mg by mouth every morning.    ONDANSETRON (ZOFRAN-ODT) 4 MG TBDL    Take 1 tablet (4 mg total) by mouth every 6 (six) hours as needed.     PANTOPRAZOLE (PROTONIX) 40 MG TABLET    Take 40 mg by mouth 2 (two) times daily.    POTASSIUM CHLORIDE SA (K-DUR,KLOR-CON) 20 MEQ TABLET    Take 1 tablet (20 mEq total) by mouth once daily. Take extra dose with Lasix - fluid pill    PREDNISONE (DELTASONE) 10 MG TABLET    Take 1 tablet (10 mg total) by mouth once daily.    PREDNISONE (DELTASONE) 20 MG TABLET    Take 2 tablets (40 mg total) by mouth once daily. for 5 days    ROFLUMILAST (DALIRESP) 500 MCG TAB    TAKE ONE TABLET BY MOUTH DAILY    SACUBITRIL-VALSARTAN (ENTRESTO) 24-26 MG PER TABLET    Take 1 tablet by mouth 2 (two) times daily.    SODIUM CHLORIDE 3% 3 % NEBULIZER SOLUTION    Take 4 mLs by nebulization as needed for Cough (chest congestion).    TEZEPELUMAB-EKKO 210 MG/1.91 ML (110 MG/ML) PNIJ    Inject 210 mg into the skin every 28 days.    TIZANIDINE (ZANAFLEX) 4 MG TABLET    Take 4 mg by mouth every 8 (eight) hours.    TRIAMCINOLONE ACETONIDE 0.025% (KENALOG) 0.025 % CREAM    Apply topically 2 (two) times daily. PRN rash and itching of ear. Mild steroid cream.   Modified Medications    Modified Medication Previous Medication    DILTIAZEM (CARDIZEM CD) 120 MG CP24 diltiaZEM (CARDIZEM CD) 120 MG Cp24       Take 1 capsule (120 mg total) by mouth once daily.    Take 2 capsules (240 mg total) by mouth once daily.    RANOLAZINE (RANEXA) 1,000 MG TB12 ranolazine (RANEXA) 1,000 mg Tb12       Take 1 tablet (1,000 mg total) by mouth 2 (two) times daily.    TAKE 1 TABLET BY MOUTH TWICE DAILY    SPIRONOLACTONE (ALDACTONE) 50 MG TABLET spironolactone (ALDACTONE) 25 MG tablet       Take 1 tablet (50 mg total) by mouth once daily.    Take 1 tablet (25 mg total) by mouth once daily.   Discontinued Medications    CARVEDILOL (COREG) 25 MG TABLET    Take 25 mg by mouth 2 (two) times daily.    CARVEDILOL (COREG) 6.25 MG TABLET    Take 1 tablet (6.25 mg total) by mouth 2 (two) times daily with meals.    FUROSEMIDE (LASIX) 20 MG TABLET    Take 20 mg by mouth daily as  needed.    METOPROLOL TARTRATE (LOPRESSOR) 100 MG TABLET    Take 100 mg by mouth 2 (two) times daily.       Review of Systems   Constitutional:  Positive for malaise/fatigue.   HENT: Negative.     Eyes: Negative.    Respiratory:  Positive for cough and shortness of breath.    Cardiovascular:  Positive for chest pain and palpitations.   Gastrointestinal: Negative.    Genitourinary: Negative.    Musculoskeletal: Negative.    Skin: Negative.    Neurological:  Positive for dizziness and loss of consciousness.   Endo/Heme/Allergies: Negative.    Psychiatric/Behavioral: Negative.     All 12 systems otherwise negative.      Wt Readings from Last 3 Encounters:   05/03/24 48.1 kg (106 lb 0.7 oz)   05/02/24 50.9 kg (112 lb 3.4 oz)   04/29/24 50.8 kg (112 lb)     Temp Readings from Last 3 Encounters:   05/02/24 98.2 °F (36.8 °C) (Tympanic)   04/29/24 98.6 °F (37 °C)   04/15/24 97.8 °F (36.6 °C)     BP Readings from Last 3 Encounters:   05/03/24 132/84   05/02/24 112/78   04/29/24 116/73     Pulse Readings from Last 3 Encounters:   05/03/24 104   05/02/24 107   04/29/24 98       /84 (BP Location: Right arm, Patient Position: Sitting, BP Method: Small (Manual))   Pulse 104   Wt 48.1 kg (106 lb 0.7 oz)   LMP  (LMP Unknown)   SpO2 (!) 91%   BMI 18.20 kg/m²     Objective:   Physical Exam  Vitals and nursing note reviewed.   Constitutional:       General: She is not in acute distress.     Appearance: She is well-developed. She is not diaphoretic.   HENT:      Head: Normocephalic and atraumatic.      Nose: Nose normal.   Eyes:      General: No scleral icterus.     Conjunctiva/sclera: Conjunctivae normal.   Neck:      Thyroid: No thyromegaly.      Vascular: No JVD.   Cardiovascular:      Rate and Rhythm: Normal rate and regular rhythm.      Heart sounds: S1 normal and S2 normal. No murmur heard.     No friction rub. No gallop. No S3 or S4 sounds.   Pulmonary:      Effort: Pulmonary effort is normal. No respiratory distress.       Breath sounds: Normal breath sounds. No stridor. No wheezing or rales.   Chest:      Chest wall: No tenderness.   Abdominal:      General: Bowel sounds are normal. There is no distension.      Palpations: Abdomen is soft. There is no mass.      Tenderness: There is no abdominal tenderness. There is no rebound.   Genitourinary:     Comments: Deferred  Musculoskeletal:         General: No tenderness or deformity. Normal range of motion.      Cervical back: Normal range of motion and neck supple.   Lymphadenopathy:      Cervical: No cervical adenopathy.   Skin:     General: Skin is warm and dry.      Coloration: Skin is not pale.      Findings: No erythema or rash.   Neurological:      Mental Status: She is alert and oriented to person, place, and time.      Motor: No abnormal muscle tone.      Coordination: Coordination normal.   Psychiatric:         Behavior: Behavior normal.         Thought Content: Thought content normal.         Judgment: Judgment normal.         Lab Results   Component Value Date     (L) 05/02/2024    K 3.9 05/02/2024     05/02/2024    CO2 24 05/02/2024    BUN 19 05/02/2024    CREATININE 1.0 05/02/2024     05/02/2024    HGBA1C 5.3 05/02/2024    MG 1.3 (L) 08/20/2023    AST 17 11/14/2023    ALT 16 11/14/2023    ALBUMIN 2.9 (L) 04/26/2024    ALBUMIN 3.2 (L) 11/14/2023    PROT 6.9 11/14/2023    BILITOT 0.3 11/14/2023    WBC 8.38 05/02/2024    HGB 11.5 (L) 05/02/2024    HCT 37.7 05/02/2024    MCV 93 05/02/2024     05/02/2024    INR 1.0 11/05/2020    TSH 1.004 09/15/2023    CHOL 158 05/02/2024    HDL 48 05/02/2024    LDLCALC 87.6 05/02/2024    TRIG 112 05/02/2024     (H) 05/02/2024     Assessment:      1. Acute on chronic combined systolic and diastolic congestive heart failure    2. Coronary artery disease of native artery of native heart with stable angina pectoris    3. Embolism and thrombosis of unspecified artery    4. Primary hypertension    5. Mixed  hyperlipidemia    6. Chronic diastolic heart failure    7. Dehydration    8. Hypokalemia    9. Hypomagnesemia    10. Orthostatic hypotension    11. History of tobacco abuse    12. Palpitations    13. History of cerebral aneurysm            Plan:   1. CAD with angina  - cont plavix, BB, Ranexa  - prior Nuclear stress neg 6/2023. ECHO 6/2/2023 with normal bi V function.   - PRN NTG    2. HTN - with occ edema, labile, stable now  - titrate meds  - rec compression stockings    3. CHF - acute on chronic   - ECHO 4/2024 with LVEF 35-40%, normal RV function, mild MR, OLIVIA 1.3 cm^2, mild MA.   - recent admission at [facility] - started on Entresto, Aldactone, Jardiance - increase Aldactone   - cont lasix and extra PRN     4. H/o CVA/Seizures  - cont meds per neuro  - needs to f/u   - carotid u/s - normal    5. Palpitation with tachycardia  - cont Dilt   - cont BB  - f/u Dr. Darling as needed, recent Vital monitor 2/2023 with occ SVT episodes - not sustained     6. COPD, h/o COVID 19  x 6 - with pulm fibrosis   - cont meds per PCP/pulm  - neg for MEENA     7. Anemia - fe def  - cont iron, repeat iron levels   - f/u heme/onc      7. Debility with dizziness  - f/u with PT/OT  - f/u ENT and PMR     Visit today included increased complexity associated with the care of the episodic problem dyspnea addressed and managing the longitudinal care of the patient due to the serious and/or complex managed problem(s) .      Thank you for allowing me to participate in this patient's care. Please do not hesitate to contact me with any questions or concerns. Consult note has been forwarded to the referral physician.

## 2024-05-03 NOTE — TELEPHONE ENCOUNTER
Called and spoke to pt regarding her recent hospital stay. Pt wants  to review her labs. Scheduled pt for today at 1:40p to come in office for hpfu.     ----- Message from Neymar House sent at 5/3/2024  7:49 AM CDT -----  Contact: self  Pt is asking for an return call in reference to discuss her recent hospital stay ,please call back at .[phone] Thx CJ

## 2024-05-03 NOTE — TELEPHONE ENCOUNTER
MC Davis spoke with patient sibling and she sent in a new prescription for patient. During the call they spoke about what the new medication was for. Brother and  nurse verbalize understanding.

## 2024-05-03 NOTE — TELEPHONE ENCOUNTER
Pt reports had some lab work done today and was seen in office, was wanting to know what the significance of some of her 'levels' going up would be in regards to her lung issues. Pt advised to call the office when it is open per protocol as all lab/test results have to go thru her PCP for explanation and follow up care. Offered to triage pt for any worsening or new symptoms she might be having tonight, but she declined. Pt encouraged to call back with any worsening symptoms requiring triage, or any other questions. She verbalized understanding.    Reason for Disposition   [1] Caller requesting NON-URGENT health information AND [2] PCP's office is the best resource    Protocols used: Information Only Call - No Triage-A-

## 2024-05-03 NOTE — TELEPHONE ENCOUNTER
----- Message from Ken Granados sent at 5/3/2024 10:24 AM CDT -----  Contact: [phone]  Calling regarding pt medication)needing to know why the antibiotic was prescribed) . Please call back at [phone] . Thanksdj

## 2024-05-04 ENCOUNTER — LAB VISIT (OUTPATIENT)
Dept: LAB | Facility: HOSPITAL | Age: 79
End: 2024-05-04
Payer: MEDICARE

## 2024-05-04 DIAGNOSIS — N18.6 TYPE 2 DIABETES MELLITUS WITH END-STAGE RENAL DISEASE: ICD-10-CM

## 2024-05-04 DIAGNOSIS — E11.22 TYPE 2 DIABETES MELLITUS WITH END-STAGE RENAL DISEASE: ICD-10-CM

## 2024-05-04 DIAGNOSIS — N18.30 CHRONIC KIDNEY DISEASE, STAGE III (MODERATE): ICD-10-CM

## 2024-05-04 DIAGNOSIS — I10 PRIMARY HYPERTENSION: ICD-10-CM

## 2024-05-04 LAB
ALBUMIN/CREAT UR: 207.5 UG/MG (ref 0–30)
CREAT UR-MCNC: 80 MG/DL (ref 15–325)
MICROALBUMIN UR DL<=1MG/L-MCNC: 166 UG/ML

## 2024-05-04 PROCEDURE — 82043 UR ALBUMIN QUANTITATIVE: CPT | Performed by: PEDIATRICS

## 2024-05-07 ENCOUNTER — TELEPHONE (OUTPATIENT)
Dept: PULMONOLOGY | Facility: CLINIC | Age: 79
End: 2024-05-07
Payer: MEDICARE

## 2024-05-07 ENCOUNTER — TELEPHONE (OUTPATIENT)
Dept: CARDIOLOGY | Facility: CLINIC | Age: 79
End: 2024-05-07
Payer: MEDICARE

## 2024-05-07 ENCOUNTER — TELEPHONE (OUTPATIENT)
Dept: INTERNAL MEDICINE | Facility: CLINIC | Age: 79
End: 2024-05-07
Payer: MEDICARE

## 2024-05-07 NOTE — TELEPHONE ENCOUNTER
Returned call to pt. SW brother and advised him of lab results. He states if he should be concerned regarding lab results for kidneys. I advised him that I will send the message to Dr. Chaudhari for a response and return call. He verbalized understanding.

## 2024-05-07 NOTE — TELEPHONE ENCOUNTER
Called patient she stated she was just released from the hospital a few days ago and would like to see Dyana to go over what was told to her at hospital because she is more comfortable with her.----- Message from Mariah Gonzalez sent at 5/7/2024 12:43 PM CDT -----  Contact: Normanrosa  Shireen would like a call back at [phone] in regards to needing to speak with nurse about her lung issues and her recent visit to the hospital.  Thanks   Am

## 2024-05-07 NOTE — TELEPHONE ENCOUNTER
----- Message from Chey Granados sent at 5/6/2024  3:30 PM CDT -----  Contact: Shireen Dao is calling in regards to her test results.please call back at .[phone]            Thanks  AVE

## 2024-05-07 NOTE — TELEPHONE ENCOUNTER
Called and spoke to pt brother regarding pt symptoms (neck and jaw pain). Pt brother stated pt has been researching her symptoms and is worried her symptoms is associated with CHF. Pt brother called pt on three way and scheduled pt for tomorrow 05/08/24 with Dr. Sarabia being that pt is already going to be at the [facility] office due to pt already having an pulmonary appointment.       ----- Message from Valdemar Claudio sent at 5/7/2024 11:13 AM CDT -----  Contact: [phone]  Patient called in requesting a call back , patient is having pain in her jaw and neck, please call back [phone] or  [phone]

## 2024-05-08 ENCOUNTER — CLINICAL SUPPORT (OUTPATIENT)
Dept: PULMONOLOGY | Facility: CLINIC | Age: 79
End: 2024-05-08
Payer: MEDICARE

## 2024-05-08 ENCOUNTER — LAB VISIT (OUTPATIENT)
Dept: LAB | Facility: HOSPITAL | Age: 79
End: 2024-05-08
Attending: INTERNAL MEDICINE
Payer: MEDICARE

## 2024-05-08 ENCOUNTER — OFFICE VISIT (OUTPATIENT)
Dept: CARDIOLOGY | Facility: CLINIC | Age: 79
End: 2024-05-08
Payer: MEDICARE

## 2024-05-08 ENCOUNTER — TELEPHONE (OUTPATIENT)
Dept: CARDIOLOGY | Facility: CLINIC | Age: 79
End: 2024-05-08
Payer: MEDICARE

## 2024-05-08 ENCOUNTER — OFFICE VISIT (OUTPATIENT)
Dept: PULMONOLOGY | Facility: CLINIC | Age: 79
End: 2024-05-08
Payer: MEDICARE

## 2024-05-08 VITALS — HEIGHT: 64 IN | BODY MASS INDEX: 17.84 KG/M2 | WEIGHT: 104.5 LBS

## 2024-05-08 VITALS
BODY MASS INDEX: 17.84 KG/M2 | RESPIRATION RATE: 18 BRPM | SYSTOLIC BLOOD PRESSURE: 140 MMHG | DIASTOLIC BLOOD PRESSURE: 70 MMHG | WEIGHT: 104.5 LBS | HEART RATE: 105 BPM | OXYGEN SATURATION: 100 % | HEIGHT: 64 IN

## 2024-05-08 VITALS
BODY MASS INDEX: 17.84 KG/M2 | HEART RATE: 98 BPM | WEIGHT: 104.5 LBS | DIASTOLIC BLOOD PRESSURE: 67 MMHG | SYSTOLIC BLOOD PRESSURE: 123 MMHG | OXYGEN SATURATION: 100 % | HEIGHT: 64 IN

## 2024-05-08 DIAGNOSIS — J84.10 PULMONARY INTERSTITIAL FIBROSIS: ICD-10-CM

## 2024-05-08 DIAGNOSIS — I25.110 ATHEROSCLEROSIS OF NATIVE CORONARY ARTERY OF NATIVE HEART WITH UNSTABLE ANGINA PECTORIS: Primary | ICD-10-CM

## 2024-05-08 DIAGNOSIS — I10 PRIMARY HYPERTENSION: ICD-10-CM

## 2024-05-08 DIAGNOSIS — J84.10 PULMONARY INTERSTITIAL FIBROSIS: Primary | ICD-10-CM

## 2024-05-08 DIAGNOSIS — I25.110 ATHEROSCLEROSIS OF NATIVE CORONARY ARTERY OF NATIVE HEART WITH UNSTABLE ANGINA PECTORIS: ICD-10-CM

## 2024-05-08 DIAGNOSIS — Z87.891 HISTORY OF TOBACCO ABUSE: ICD-10-CM

## 2024-05-08 DIAGNOSIS — I25.118 CORONARY ARTERY DISEASE OF NATIVE ARTERY OF NATIVE HEART WITH STABLE ANGINA PECTORIS: ICD-10-CM

## 2024-05-08 DIAGNOSIS — J45.40 MODERATE PERSISTENT ASTHMA WITHOUT COMPLICATION: ICD-10-CM

## 2024-05-08 DIAGNOSIS — J47.9 BRONCHIECTASIS WITHOUT COMPLICATION: ICD-10-CM

## 2024-05-08 DIAGNOSIS — J41.1 MUCOPURULENT CHRONIC BRONCHITIS: Chronic | ICD-10-CM

## 2024-05-08 DIAGNOSIS — R07.9 CHEST PAIN, UNSPECIFIED TYPE: ICD-10-CM

## 2024-05-08 DIAGNOSIS — J45.50 SEVERE PERSISTENT ASTHMA WITHOUT COMPLICATION: ICD-10-CM

## 2024-05-08 DIAGNOSIS — I50.43 ACUTE ON CHRONIC COMBINED SYSTOLIC AND DIASTOLIC CONGESTIVE HEART FAILURE: ICD-10-CM

## 2024-05-08 DIAGNOSIS — J44.89 ASTHMA WITH COPD: ICD-10-CM

## 2024-05-08 DIAGNOSIS — R06.02 SOB (SHORTNESS OF BREATH) ON EXERTION: ICD-10-CM

## 2024-05-08 DIAGNOSIS — I50.32 CHRONIC DIASTOLIC HEART FAILURE: ICD-10-CM

## 2024-05-08 DIAGNOSIS — I95.1 ORTHOSTATIC HYPOTENSION: ICD-10-CM

## 2024-05-08 DIAGNOSIS — I25.5 ISCHEMIC CARDIOMYOPATHY: ICD-10-CM

## 2024-05-08 DIAGNOSIS — Z95.5 S/P CORONARY ARTERY STENT PLACEMENT: ICD-10-CM

## 2024-05-08 LAB
ANION GAP SERPL CALC-SCNC: 12 MMOL/L (ref 8–16)
BASOPHILS # BLD AUTO: 0.03 K/UL (ref 0–0.2)
BASOPHILS NFR BLD: 0.3 % (ref 0–1.9)
BRPFT: ABNORMAL
BUN SERPL-MCNC: 14 MG/DL (ref 8–23)
CALCIUM SERPL-MCNC: 9.9 MG/DL (ref 8.7–10.5)
CHLORIDE SERPL-SCNC: 102 MMOL/L (ref 95–110)
CO2 SERPL-SCNC: 22 MMOL/L (ref 23–29)
CREAT SERPL-MCNC: 1.2 MG/DL (ref 0.5–1.4)
DIFFERENTIAL METHOD BLD: ABNORMAL
EOSINOPHIL # BLD AUTO: 0.1 K/UL (ref 0–0.5)
EOSINOPHIL NFR BLD: 0.4 % (ref 0–8)
ERYTHROCYTE [DISTWIDTH] IN BLOOD BY AUTOMATED COUNT: 15.9 % (ref 11.5–14.5)
EST. GFR  (NO RACE VARIABLE): 46 ML/MIN/1.73 M^2
FEF 25 75 CHG: 10 %
FEF 25 75 LLN: 0.66
FEF 25 75 POST REF: 193.2 %
FEF 25 75 PRE REF: 175.6 %
FEF 25 75 REF: 1.58
FET100 CHG: -28.1 %
FEV1 CHG: -1.3 %
FEV1 FVC CHG: 6 %
FEV1 FVC LLN: 62
FEV1 FVC POST REF: 115.5 %
FEV1 FVC PRE REF: 109 %
FEV1 FVC REF: 77
FEV1 LLN: 1.38
FEV1 POST REF: 104.5 %
FEV1 PRE REF: 105.8 %
FEV1 REF: 1.96
FVC CHG: -6.8 %
FVC LLN: 1.81
FVC POST REF: 89.3 %
FVC PRE REF: 95.8 %
FVC REF: 2.58
GLUCOSE SERPL-MCNC: 100 MG/DL (ref 70–110)
HCT VFR BLD AUTO: 34.7 % (ref 37–48.5)
HGB BLD-MCNC: 10.9 G/DL (ref 12–16)
IMM GRANULOCYTES # BLD AUTO: 0.09 K/UL (ref 0–0.04)
IMM GRANULOCYTES NFR BLD AUTO: 0.8 % (ref 0–0.5)
LYMPHOCYTES # BLD AUTO: 2.4 K/UL (ref 1–4.8)
LYMPHOCYTES NFR BLD: 20.4 % (ref 18–48)
MCH RBC QN AUTO: 29.6 PG (ref 27–31)
MCHC RBC AUTO-ENTMCNC: 31.4 G/DL (ref 32–36)
MCV RBC AUTO: 94 FL (ref 82–98)
MONOCYTES # BLD AUTO: 1.3 K/UL (ref 0.3–1)
MONOCYTES NFR BLD: 11.4 % (ref 4–15)
NEUTROPHILS # BLD AUTO: 7.7 K/UL (ref 1.8–7.7)
NEUTROPHILS NFR BLD: 66.7 % (ref 38–73)
NRBC BLD-RTO: 0 /100 WBC
PEF CHG: -10.4 %
PEF LLN: 3.19
PEF POST REF: 90.8 %
PEF PRE REF: 101.2 %
PEF REF: 4.91
PLATELET # BLD AUTO: 381 K/UL (ref 150–450)
PMV BLD AUTO: 10.1 FL (ref 9.2–12.9)
POST FEF 25 75: 3.05 L/S (ref 0.66–2.49)
POST FET 100: 3.54 SEC
POST FEV1 FVC: 89 % (ref 62.48–91.6)
POST FEV1: 2.05 L (ref 1.38–2.54)
POST FVC: 2.3 L (ref 1.81–3.34)
POST PEF: 4.46 L/S (ref 3.19–6.63)
POTASSIUM SERPL-SCNC: 4.5 MMOL/L (ref 3.5–5.1)
PRE FEF 25 75: 2.77 L/S (ref 0.66–2.49)
PRE FET 100: 4.93 SEC
PRE FEV1 FVC: 84 % (ref 62.48–91.6)
PRE FEV1: 2.07 L (ref 1.38–2.54)
PRE FVC: 2.47 L (ref 1.81–3.34)
PRE PEF: 4.97 L/S (ref 3.19–6.63)
RBC # BLD AUTO: 3.68 M/UL (ref 4–5.4)
SODIUM SERPL-SCNC: 136 MMOL/L (ref 136–145)
WBC # BLD AUTO: 11.51 K/UL (ref 3.9–12.7)

## 2024-05-08 PROCEDURE — 99215 OFFICE O/P EST HI 40 MIN: CPT | Mod: S$GLB,,, | Performed by: INTERNAL MEDICINE

## 2024-05-08 PROCEDURE — 1126F AMNT PAIN NOTED NONE PRSNT: CPT | Mod: CPTII,S$GLB,, | Performed by: NURSE PRACTITIONER

## 2024-05-08 PROCEDURE — 1159F MED LIST DOCD IN RCRD: CPT | Mod: CPTII,S$GLB,, | Performed by: INTERNAL MEDICINE

## 2024-05-08 PROCEDURE — 3074F SYST BP LT 130 MM HG: CPT | Mod: CPTII,S$GLB,, | Performed by: INTERNAL MEDICINE

## 2024-05-08 PROCEDURE — 94060 EVALUATION OF WHEEZING: CPT | Mod: 59,S$GLB,, | Performed by: INTERNAL MEDICINE

## 2024-05-08 PROCEDURE — 85025 COMPLETE CBC W/AUTO DIFF WBC: CPT | Performed by: INTERNAL MEDICINE

## 2024-05-08 PROCEDURE — 99999 PR PBB SHADOW E&M-EST. PATIENT-LVL V: CPT | Mod: PBBFAC,,, | Performed by: INTERNAL MEDICINE

## 2024-05-08 PROCEDURE — 85730 THROMBOPLASTIN TIME PARTIAL: CPT | Performed by: INTERNAL MEDICINE

## 2024-05-08 PROCEDURE — 1101F PT FALLS ASSESS-DOCD LE1/YR: CPT | Mod: CPTII,S$GLB,, | Performed by: NURSE PRACTITIONER

## 2024-05-08 PROCEDURE — 99999 PR PBB SHADOW E&M-EST. PATIENT-LVL V: CPT | Mod: PBBFAC,,, | Performed by: NURSE PRACTITIONER

## 2024-05-08 PROCEDURE — 3288F FALL RISK ASSESSMENT DOCD: CPT | Mod: CPTII,S$GLB,, | Performed by: INTERNAL MEDICINE

## 2024-05-08 PROCEDURE — 3072F LOW RISK FOR RETINOPATHY: CPT | Mod: CPTII,S$GLB,, | Performed by: INTERNAL MEDICINE

## 2024-05-08 PROCEDURE — 94618 PULMONARY STRESS TESTING: CPT | Mod: S$GLB,,, | Performed by: INTERNAL MEDICINE

## 2024-05-08 PROCEDURE — 3078F DIAST BP <80 MM HG: CPT | Mod: CPTII,S$GLB,, | Performed by: INTERNAL MEDICINE

## 2024-05-08 PROCEDURE — 3078F DIAST BP <80 MM HG: CPT | Mod: CPTII,S$GLB,, | Performed by: NURSE PRACTITIONER

## 2024-05-08 PROCEDURE — 3072F LOW RISK FOR RETINOPATHY: CPT | Mod: CPTII,S$GLB,, | Performed by: NURSE PRACTITIONER

## 2024-05-08 PROCEDURE — 1101F PT FALLS ASSESS-DOCD LE1/YR: CPT | Mod: CPTII,S$GLB,, | Performed by: INTERNAL MEDICINE

## 2024-05-08 PROCEDURE — 3288F FALL RISK ASSESSMENT DOCD: CPT | Mod: CPTII,S$GLB,, | Performed by: NURSE PRACTITIONER

## 2024-05-08 PROCEDURE — 99215 OFFICE O/P EST HI 40 MIN: CPT | Mod: 25,S$GLB,, | Performed by: NURSE PRACTITIONER

## 2024-05-08 PROCEDURE — 80048 BASIC METABOLIC PNL TOTAL CA: CPT | Performed by: INTERNAL MEDICINE

## 2024-05-08 PROCEDURE — 1126F AMNT PAIN NOTED NONE PRSNT: CPT | Mod: CPTII,S$GLB,, | Performed by: INTERNAL MEDICINE

## 2024-05-08 PROCEDURE — G2211 COMPLEX E/M VISIT ADD ON: HCPCS | Mod: S$GLB,,, | Performed by: INTERNAL MEDICINE

## 2024-05-08 PROCEDURE — 36415 COLL VENOUS BLD VENIPUNCTURE: CPT | Performed by: INTERNAL MEDICINE

## 2024-05-08 PROCEDURE — 85610 PROTHROMBIN TIME: CPT | Performed by: INTERNAL MEDICINE

## 2024-05-08 PROCEDURE — 3077F SYST BP >= 140 MM HG: CPT | Mod: CPTII,S$GLB,, | Performed by: NURSE PRACTITIONER

## 2024-05-08 RX ORDER — ISOSORBIDE MONONITRATE 120 MG/1
120 TABLET, EXTENDED RELEASE ORAL NIGHTLY
Qty: 90 TABLET | Refills: 3 | Status: SHIPPED | OUTPATIENT
Start: 2024-05-08 | End: 2025-05-08

## 2024-05-08 RX ORDER — FUROSEMIDE 20 MG/1
20 TABLET ORAL DAILY PRN
Qty: 30 TABLET | Refills: 11 | Status: SHIPPED | OUTPATIENT
Start: 2024-05-08 | End: 2025-05-08

## 2024-05-08 RX ORDER — NITROGLYCERIN 0.4 MG/1
0.4 TABLET SUBLINGUAL EVERY 5 MIN PRN
Qty: 100 TABLET | Refills: 0 | Status: SHIPPED | OUTPATIENT
Start: 2024-05-08

## 2024-05-08 NOTE — H&P (VIEW-ONLY)
Subjective:   Patient ID:  Shireen Feilx is a 78 y.o. female who presents for evaluation of No chief complaint on file.      HPI  78-year-old female, with history of CAD possible ischemic cardiomyopathy.  She had a stent she states more than 10 years ago possibly to the LAD.  Her LV EF has been in up and down with the past few years between 35 40% and 50%.    She also has pulmonary fibrosis moderate at least by CT scan.  She follows with Pulmonary.  However she states that her oxygen is 100% it does not drop with exertion.  She has to stop as soon as he walks due to chest heaviness radiating up to her neck.  This is happening with a very low level of activity CCS 3-4.  She has a an abundant amount of coronary artery calcification on her CT scan.    She is maxed out on antianginals almost.    Has dyspnea on exertion.  Was recently had [place] for CHF.  Her BNP was elevated.    However today she looks good at rest.  Her lungs has minimal crackles possibly secondary to her long history of pulmonary fibrosis.    Past Medical History:   Diagnosis Date    Abnormal ECG 8/5/2019    Aneurysm     Anticoagulant long-term use     Arthritis     CAD in native artery 8/5/2019    COPD (chronic obstructive pulmonary disease)     Diabetes mellitus     Fall 10/10/2019    Formatting of this note might be different from the original. Found sitting on floor next to bed last night Mild confusion today Does not recall falling or how she ended up on floor UA today Labs yesterday unremarkable    Glaucoma     Hypertension     Seizures     Shingles 05/27/2017    Stroke        Past Surgical History:   Procedure Laterality Date    BRAIN SURGERY      CARDIAC CATHETERIZATION      COLONOSCOPY N/A 09/21/2023    Procedure: COLONOSCOPY;  Surgeon: Joanna Leal MD;  Location: [facility];  Service: Endoscopy;  Laterality: N/A;    CORONARY ANGIOPLASTY      EPIDURAL STEROID INJECTION INTO CERVICAL SPINE N/A 2/7/2024    Procedure:  Cervical IL XANDER, Level C6/7, RN IV sedation;  Surgeon: Francisco Oshea MD;  Location: [facility]IN [facility];  Service: Pain Management;  Laterality: N/A;    EPIDURAL STEROID INJECTION INTO LUMBAR SPINE Bilateral 11/12/2019    Procedure: TF XANDER L4/5;  Surgeon: Desean Dias MD;  [facility];  Service: Pain Management;  Laterality: Bilateral;    HYSTERECTOMY      INJECTION OF ANESTHETIC AGENT AROUND MEDIAL BRANCH NERVES INNERVATING LUMBAR FACET JOINT Bilateral 01/31/2020    Procedure: Bilateral L3-5 MBB;  Surgeon: Desean Dias MD;  Location: [facility];  Service: Pain Management;  Laterality: Bilateral;    INJECTION OF ANESTHETIC AGENT INTO SACROILIAC JOINT Right 11/16/2021    Procedure: Right BLOCK, SACROILIAC JOINT Right GTB with RN IV sedation;  Surgeon: Yao Fulton MD;  Location: [facility];  Service: Pain Management;  Laterality: Right;    INJECTION OF ANESTHETIC AGENT INTO SACROILIAC JOINT Bilateral 07/28/2023    Procedure: Bilateral GT bursa + bilateral SIJ injection;  Surgeon: Francisco Oshea MD;  Location: [facility];  Service: Pain Management;  Laterality: Bilateral;    INJECTION OF JOINT Bilateral 07/09/2020    Procedure: Bilateral shoulder GH Joint injection with local;  Surgeon: Desean Dias MD;  Location: [facility];  Service: Pain Management;  Laterality: Bilateral;    INJECTION OF JOINT Bilateral 07/28/2023    Procedure: Bilateral GT bursa + bilateral SIJ injection NEEDS ADDITIONAL SEDATION AND MORE TIME BEFORE INJECTION RN IV Sedation;  Surgeon: Francisco Oshea MD;  Location: [facility];  Service: Pain Management;  Laterality: Bilateral;    INJECTION OF JOINT N/A 10/25/2023    Procedure: Sacrococcygeal joint injection;  Surgeon: Francisco Oshea MD;  [facility]: [facility];  Service: Pain Management;  Laterality: N/A;    OOPHORECTOMY      SELECTIVE INJECTION OF ANESTHETIC AGENT AROUND LUMBAR SPINAL NERVE ROOT BY TRANSFORAMINAL APPROACH Bilateral 04/26/2023    Procedure:  Bilateral L4/5 TF XANDER RN IV Sedation;  Surgeon: Francisco Oshea MD;  Location: [facility]IN [facility];  Service: Pain Management;  Laterality: Bilateral;    TRANSFORAMINAL EPIDURAL INJECTION OF STEROID Bilateral 2019    Procedure: Bilateral L3/4 Transforaminal Epidural Steroid Injection;  Surgeon: Desean Dias MD;  Location: [facility];  Service: Pain Management;  Laterality: Bilateral;    TRANSFORAMINAL EPIDURAL INJECTION OF STEROID Bilateral 03/10/2020    Procedure: Right T12/L1 TF XANDER with local;  Surgeon: Desean Dias MD;  Location: [facility];  Service: Pain Management;  Laterality: Bilateral;    TRANSFORAMINAL EPIDURAL INJECTION OF STEROID Right 2020    Procedure: Right T12/L1 TFESI Covid day of procedure;  Surgeon: Desean Dias MD;  Location: [facility];  Service: Pain Management;  Laterality: Right;       Social History     Tobacco Use    Smoking status: Former     Current packs/day: 0.00     Average packs/day: 1 pack/day for 42.3 years (42.3 ttl pk-yrs)     Types: Cigarettes     Start date:      Quit date: 2004     Years since quittin.0    Smokeless tobacco: Former   Substance Use Topics    Alcohol use: No    Drug use: Never       Family History   Problem Relation Name Age of Onset    No Known Problems Mother      No Known Problems Father      Breast cancer Maternal Aunt      Breast cancer Maternal Aunt      Breast cancer Maternal Aunt         Review of Systems   Cardiovascular:  Positive for chest pain and dyspnea on exertion. Negative for palpitations and syncope.   Genitourinary: Negative.    Neurological: Negative.        Current Outpatient Medications on File Prior to Visit   Medication Sig    albuterol (PROVENTIL) 2.5 mg /3 mL (0.083 %) nebulizer solution EMPTY 1 VIAL INTO NEBULIZER EVERY 4 HOURS AS NEEDED FRO RESCUE    albuterol (PROVENTIL/VENTOLIN HFA) 90 mcg/actuation inhaler INHALE TWO PUFFS INTO THE LUNGS EVERY 4 HOURS AS NEEDED    albuterol-budesonide  (AIRSUPRA) 90-80 mcg/actuation Inhale 2 puffs into the lungs every 4 (four) hours as needed (wheezing, cough).    APTIOM 400 mg Tab tablet Take 400 mg by mouth.    atorvastatin (LIPITOR) 40 MG tablet TAKE 1 TABLET BY MOUTH ONCE DAILY    carvediloL (COREG) 12.5 MG tablet Take 1 tablet (12.5 mg total) by mouth 2 (two) times daily with meals.    cholecalciferol, vitamin D3, 1,250 mcg (50,000 unit) capsule Take 1 capsule (50,000 Units total) by mouth every 7 days.    cholestyramine (QUESTRAN) 4 gram packet TAKE 1 PACKET BY MOUTH TWICE DAILY AS DIRECTED    clopidogreL (PLAVIX) 75 mg tablet Take 1 tablet (75 mg total) by mouth once daily.    denosumab (PROLIA) 60 mg/mL Syrg every 6 (six) months.    dicyclomine (BENTYL) 10 MG capsule Take 1 capsule (10 mg total) by mouth 2 (two) times daily.    diltiaZEM (CARDIZEM CD) 120 MG Cp24 Take 1 capsule (120 mg total) by mouth once daily.    dorzolamide-timolol 2-0.5% (COSOPT) 22.3-6.8 mg/mL ophthalmic solution Place 1 drop into both eyes every 12 (twelve) hours.    empagliflozin (JARDIANCE) 10 mg tablet Take 1 tablet (10 mg total) by mouth once daily.    EScitalopram oxalate (LEXAPRO) 20 MG tablet TAKE 1 TABLET BY MOUTH ONCE DAILY    fluocinonide (LIDEX) 0.05 % external solution Apply topically 2 (two) times daily. Use on itchy spots on scalp and ear    fluticasone propionate (FLONASE) 50 mcg/actuation nasal spray USE 2 SPRAYS INTO EACH NOSTRIL ONCE A DAY AT 6AM AS DIRECTED    fluticasone-umeclidin-vilanter (TRELEGY ELLIPTA) 200-62.5-25 mcg inhaler INHALE 1 PUFF INTO THE LUNGS DAILY    gabapentin (NEURONTIN) 300 MG capsule Take 1 capsule (300 mg total) by mouth 3 (three) times daily.    hydrOXYzine HCL (ATARAX) 25 MG tablet TAKE 1 TABLET BY MOUTH EVERY EVENING    ipratropium (ATROVENT) 42 mcg (0.06 %) nasal spray USE 2 SPRAYS INTO EACH NOSTRIL FOUR TIMES DAILY    ketoconazole (NIZORAL) 2 % cream Apply topically 2 (two) times daily. For dry flaky patches of ear.    latanoprost  0.005 % ophthalmic solution Place 1 drop into both eyes every evening.    levETIRAcetam (KEPPRA) 750 MG Tab Take 1 tablet (750 mg total) by mouth 2 (two) times daily.    linezolid (ZYVOX) 600 mg Tab Take 1 tablet (600 mg total) by mouth every 12 (twelve) hours.    magnesium oxide (MAG-OX) 400 mg (241.3 mg magnesium) tablet Take 1 tablet (400 mg total) by mouth once daily.    montelukast (SINGULAIR) 10 mg tablet Take 1 tablet (10 mg total) by mouth once daily.    montelukast (SINGULAIR) 10 mg tablet Take 1 tablet (10 mg total) by mouth once daily.    mucus clearing device (AEROBIKA OSCILLATING PEP SYSTM) by Misc.(Non-Drug; Combo Route) route 4 (four) times daily.    omeprazole (PRILOSEC) 40 MG capsule Take 40 mg by mouth every morning.    ondansetron (ZOFRAN-ODT) 4 MG TbDL Take 1 tablet (4 mg total) by mouth every 6 (six) hours as needed.    pantoprazole (PROTONIX) 40 MG tablet Take 40 mg by mouth 2 (two) times daily.    potassium chloride SA (K-DUR,KLOR-CON) 20 MEQ tablet Take 1 tablet (20 mEq total) by mouth once daily. Take extra dose with Lasix - fluid pill    ranolazine (RANEXA) 1,000 mg Tb12 Take 1 tablet (1,000 mg total) by mouth 2 (two) times daily.    roflumilast (DALIRESP) 500 mcg Tab TAKE ONE TABLET BY MOUTH DAILY    sacubitriL-valsartan (ENTRESTO) 24-26 mg per tablet Take 1 tablet by mouth 2 (two) times daily.    sodium chloride 3% 3 % nebulizer solution Take 4 mLs by nebulization as needed for Cough (chest congestion).    spironolactone (ALDACTONE) 50 MG tablet Take 1 tablet (50 mg total) by mouth once daily.    tezepelumab-ekko 210 mg/1.91 mL (110 mg/mL) PnIj Inject 210 mg into the skin every 28 days.    tiZANidine (ZANAFLEX) 4 MG tablet Take 4 mg by mouth every 8 (eight) hours.    triamcinolone acetonide 0.025% (KENALOG) 0.025 % cream Apply topically 2 (two) times daily. PRN rash and itching of ear. Mild steroid cream.    [DISCONTINUED] furosemide (LASIX) 40 MG tablet Take 1 tablet (40 mg total) by  mouth daily as needed (for edema, swelling, fluid build up, or 3 pound weight gain in 24 hours).    [DISCONTINUED] isosorbide mononitrate (IMDUR) 60 MG 24 hr tablet Take 1 tablet (60 mg total) by mouth every evening.    [DISCONTINUED] nitroGLYCERIN (NITROSTAT) 0.4 MG SL tablet DISSOLVE 1 TABLET UNDER TONGUE EVERY 5 MINUTES FOR CHEST PAIN (MAY TAKE UP TO 3 DOSES THEN SEEK MEDICAL ATTENTION)    fluconazole (DIFLUCAN) 150 MG Tab Take 1 tablet (150 mg total) by mouth once daily. (Patient not taking: Reported on 5/2/2024)    HYDROcodone-acetaminophen (NORCO) 5-325 mg per tablet Take 1 tablet by mouth every 6 (six) hours as needed for Pain. (Patient not taking: Reported on 5/8/2024)    ibuprofen (ADVIL,MOTRIN) 800 MG tablet Take 1 tablet (800 mg total) by mouth 3 (three) times daily. (Patient not taking: Reported on 5/8/2024)    MYRBETRIQ 50 mg Tb24 TAKE 1 TABLET BY MOUTH ONCE DAILY (Patient not taking: Reported on 5/8/2024)    predniSONE (DELTASONE) 10 MG tablet Take 1 tablet (10 mg total) by mouth once daily. (Patient not taking: Reported on 5/8/2024)     No current facility-administered medications on file prior to visit.       Objective:   Objective:  Wt Readings from Last 3 Encounters:   05/08/24 47.4 kg (104 lb 8 oz)   05/08/24 47.4 kg (104 lb 8 oz)   05/08/24 47.4 kg (104 lb 8 oz)     Temp Readings from Last 3 Encounters:   05/02/24 98.2 °F (36.8 °C) (Tympanic)   04/29/24 98.6 °F (37 °C)   04/15/24 97.8 °F (36.6 °C)     BP Readings from Last 3 Encounters:   05/08/24 123/67   05/08/24 (!) 140/70   05/03/24 132/84     Pulse Readings from Last 3 Encounters:   05/08/24 98   05/08/24 105   05/03/24 104       Physical Exam  Vitals reviewed.   Constitutional:       Appearance: She is well-developed.   Neck:      Vascular: No carotid bruit.   Cardiovascular:      Rate and Rhythm: Normal rate and regular rhythm.      Pulses: Intact distal pulses.      Heart sounds: Normal heart sounds. No murmur heard.  Pulmonary:       Breath sounds: Rales present.   Neurological:      Mental Status: She is oriented to person, place, and time.         Lab Results   Component Value Date    CHOL 158 05/02/2024    CHOL 142 04/25/2024    CHOL 144 08/31/2023     Lab Results   Component Value Date    HDL 48 05/02/2024    HDL 56 04/25/2024    HDL 69 08/31/2023     Lab Results   Component Value Date    LDLCALC 87.6 05/02/2024    LDLCALC 72 04/25/2024    LDLCALC 62.4 (L) 08/31/2023     Lab Results   Component Value Date    TRIG 112 05/02/2024    TRIG 72 04/25/2024    TRIG 63 08/31/2023     Lab Results   Component Value Date    CHOLHDL 30.4 05/02/2024    CHOLHDL 47.9 08/31/2023    CHOLHDL 39.7 08/14/2023       Chemistry        Component Value Date/Time     (L) 05/02/2024 1050    K 3.9 05/02/2024 1050     05/02/2024 1050    CO2 24 05/02/2024 1050    BUN 19 05/02/2024 1050    CREATININE 1.0 05/02/2024 1050     05/02/2024 1050        Component Value Date/Time    CALCIUM 9.7 05/02/2024 1050    ALKPHOS 51 (L) 11/14/2023 1348    AST 17 11/14/2023 1348    ALT 16 11/14/2023 1348    BILITOT 0.3 11/14/2023 1348    ESTGFRAFRICA 64 04/26/2024 0557    ESTGFRAFRICA >60 06/02/2022 1549    EGFRNONAA >60 06/02/2022 1549          Lab Results   Component Value Date    TSH 1.004 09/15/2023     Lab Results   Component Value Date    INR 1.0 11/05/2020    INR 1.0 08/20/2020    INR 1.0 08/05/2020     Lab Results   Component Value Date    WBC 8.38 05/02/2024    HGB 11.5 (L) 05/02/2024    HCT 37.7 05/02/2024    MCV 93 05/02/2024     05/02/2024     BNP  @LABRCNTIP(BNP,BNPTRIAGEBLO)@  Estimated Creatinine Clearance: 34.1 mL/min (based on SCr of 1 mg/dL).     Imaging:  ======    No results found for this or any previous visit.    Results for orders placed during the hospital encounter of 06/21/23    US Carotid Bilateral    Narrative  EXAMINATION:  US CAROTID BILATERAL    TECHNIQUE:  Grayscale and color Doppler ultrasound examination of the carotid and  vertebral artery systems bilaterally.  Stenosis estimates are per the NASCET measurement criteria.    COMPARISON:  None.    FINDINGS:  Right:    Internal Carotid Artery (ICA) peak systolic velocity 69 cm/sec    ICA/CCA peak systolic velocity ratio: 1.0    Plaque formation: Minimal    Vertebral artery: Antegrade flow and normal waveform.    Left:    Internal Carotid Artery (ICA)  peak systolic velocity 62 cm/sec    ICA/CCA peak systolic velocity ratio: 0.8    Plaque formation: Minimal    Vertebral artery: Antegrade flow and normal waveform.    Impression  No evidence of a hemodynamically significant carotid bifurcation stenosis.      Electronically signed by: Kristopher Velasquez MD  Date:    06/21/2023  Time:    15:13    Results for orders placed during the hospital encounter of 04/15/24    X-Ray Chest PA And Lateral    Narrative  EXAMINATION:  XR CHEST PA AND LATERAL    CLINICAL HISTORY:  Cough, unspecified    TECHNIQUE:  PA and lateral views of the chest were performed.    COMPARISON:  04/05/2024    FINDINGS:  The cardiac and mediastinal silhouettes appear within normal limits.   Diffuse coarsening of the interstitial markings remains unchanged from multiple prior examinations.  Underlying interstitial lung disease not excluded.  No focal parenchymal consolidation or definite pleural effusion demonstrated.  No acute osseous findings demonstrated.    Impression  Unchanged appearance of the chest as above      Electronically signed by: Neri Husain MD  Date:    04/15/2024  Time:    10:33    No results found for this or any previous visit.    No valid procedures specified.    No results found for this or any previous visit.      Results for orders placed during the hospital encounter of 06/02/23    Nuclear Stress - Cardiology Interpreted    Interpretation Summary    Normal myocardial perfusion scan. There is no evidence of myocardial ischemia or infarction.    The gated perfusion images showed an ejection fraction of 54%  at rest. The gated perfusion images showed an ejection fraction of 62% post stress.    There is normal wall motion at rest and post stress.    LV cavity size is normal at rest and normal at stress.    The ECG portion of the study is negative for ischemia.    The patient reported no chest pain during the stress test.      Results for orders placed during the hospital encounter of 06/02/23    Echo    Interpretation Summary  · The left ventricle is normal in size with concentric hypertrophy and low normal systolic function.  · The estimated ejection fraction is 50%.  · Normal left ventricular diastolic function.  · There is abnormal septal wall motion consistent with left bundle branch block.  · Normal right ventricular size with normal right ventricular systolic function.  · Normal central venous pressure (3 mmHg).  · The estimated PA systolic pressure is 30 mmHg.  · Mild mitral regurgitation.       CONCLUSIONS: 4.2024  1. Normal left ventricular cavity size. Moderately depressed left ventricular systolic   function. LVEF 35 - 40%.   2. Normal right ventricular size and systolic function.   3. The left atrium is normal in size.   4. Normal right atrial size and morphology.   5. Mild mitral valve regurgitation. Mitral valve leaflets appear mildly thickened.   6. Aortic valve cusps appear mildly calcified. AV peak PG 13 mmHg. AV Mean PG 7 mmHg. OLIVIA   (VTI) 1.38 cm2.   7. No or trivial tricuspid valve regurgitation.   8. Mild pulmonic valve regurgitation.   9. Normal pericardium without evidence of pericardial effusion.   10. There is mild atherosclerosis visualized in the abdominal aorta by limited views.     Diagnostic Results:  ECG: Reviewed    The ASCVD Risk score (Collins DK, et al., 2019) failed to calculate for the following reasons:    The patient has a prior MI or stroke diagnosis        Assessment and Plan:   Atherosclerosis of native coronary artery of native heart with unstable angina pectoris  -      nitroGLYCERIN (NITROSTAT) 0.4 MG SL tablet; Place 1 tablet (0.4 mg total) under the tongue every 5 (five) minutes as needed for Chest pain.  Dispense: 100 tablet; Refill: 0    Acute on chronic combined systolic and diastolic congestive heart failure  -     furosemide (LASIX) 20 MG tablet; Take 1 tablet (20 mg total) by mouth daily as needed (for edema, swelling, fluid build up, or 3 pound weight gain in 24 hours).  Dispense: 30 tablet; Refill: 11  -     isosorbide mononitrate (IMDUR) 120 MG 24 hr tablet; Take 1 tablet (120 mg total) by mouth every evening.  Dispense: 90 tablet; Refill: 3    S/P coronary artery stent placement    Coronary artery disease of native artery of native heart with stable angina pectoris    Primary hypertension    Chronic diastolic heart failure    History of tobacco abuse    Orthostatic hypotension    Pulmonary interstitial fibrosis    Ischemic cardiomyopathy      Continue with Plavix.  Increase Imdur from 60 mg 120 mg.    Continue with the Ranexa.    On carvedilol and Cardizem.    Pretty much maxed out on antianginals.  With CCS 3-4.    Refill nitro p.r.n.  Go to the emergency room if symptoms fail to improve.  She had a normal nuclear stress test last year however EF is running between 35-40% with escalating angina.    Recommended right and left heart catheterization.    Discussed with her risks and benefits.  She is accompanied by family member.  She is agreeable with the procedure.  Reviewed all tests and above medical conditions with patient in detail and formulated treatment plan.  Risk factor modification discussed.   Cardiac low salt diet discussed.  Maintaining healthy weight and weight loss goals were discussed in clinic.    Follow up after heart catheterization

## 2024-05-08 NOTE — PROGRESS NOTES
Patient does not appear to be in any acute distress/shows no evidence of clinical instability at this time. Provider has reviewed discharge instructions with the patient/family. The patient/family verbalized understanding instructions as well as need for follow up for any further symptoms.     Discharge papers given, education provided, and any questions answered. Patient discharged by provider. Six minute walk complete.

## 2024-05-08 NOTE — TELEPHONE ENCOUNTER
Additional Information Required  There is an existing case within the [facility] environment that has the same patient, prescriber, and drug. This case must be finalized before proceeding with similar requests.  Drug  dilTIAZem HCl ER Coated Beads 120MG er capsules    When I submitted this to [facility] this is the message I received back will await the response form the pa that is pending pb

## 2024-05-08 NOTE — PROCEDURES
"[facility] - Pulmonary Function  Six Minute Walk     SUMMARY     Ordering Provider: Meggan Fishman   Interpreting Provider: Dr. Sam  Performing nurse/tech/RT: ANGELY Albright RRT  Diagnosis: Shortness of Breath  Height: 5' 4" (162.6 cm)  Weight: 47.4 kg (104 lb 8 oz)  BMI (Calculated): 17.9   Patient Race:             Phase Oxygen Assessment Supplemental O2 Heart   Rate Blood Pressure Paulino Dyspnea Scale Rating   Resting 98 % Room Air 104 bpm 133/62 4   Exercise        Minute        1 98 % Room Air 111 bpm     2 100 % Room Air 111 bpm     3 99 % Room Air 115 bpm     4 98 % Room Air 119 bpm     5 98 % Room Air 115 bpm     6  98 % Room Air 113 bpm 106/59 7-8   Recovery        Minute        1 98 % Room Air 108 bpm     2 98 % Room Air 106 bpm     3 100 % Room Air 105 bpm     4 100 % Room Air 105 bpm 140/70 5-6     Six Minute Walk Summary  6MWT Status: completed with stops  Patient Reported: Dizziness, Dyspnea, Lightheadedness (back, neck pain)     Interpretation:  Did the patient stop or pause?: No                                         Total Time Walked (Calculated): 360 seconds  Final Partial Lap Distance (feet): 25 feet  Total Distance Meters (Calculated): 68.58 meters  Predicted Distance Meters (Calculated): 444.92 meters  Percentage of Predicted (Calculated): 15.41  Peak VO2 (Calculated): 6.04  Mets: 1.73  Has The Patient Had a Previous Six Minute Walk Test?: Yes       Previous 6MWT Results  Has The Patient Had a Previous Six Minute Walk Test?: Yes  Date of Previous Test: 04/26/23  Total Time Walked: 360 seconds  Total Distance (meters): 198.12  Predicted Distance (meters): 430.2 meters  Percentage of Predicted: 46.05  Percent Change (Calculated): 0.65      "

## 2024-05-08 NOTE — PROGRESS NOTES
Subjective:      Established patient    Patient ID: Shireen Felix is a 78 y.o. female.      Chief Complaint: Asthma      HPI   5/8/2024 Sravanthi BENTLEY  Shireen Felix 78-year-old presents with her brother, patient is riding in wheelchair for follow-up on COPD chronic mucopurulent bronchitis type, shortness of breath, pulmonary fibrosis.   Patient presents overall significantly improved since treated for acute on chronic congestive heart failure.   Patient is still feeling overall weeks since recent hospitalization from a pulmonary standpoint she has stabilized with decreased cough no longer producing mucus.  Still continued feeling of shortness of breath with attempting to walk even a short distance..  5/8/2024 6 minute walk distance no desaturations requiring supplemental oxygen at rest or exertion exercise capacity is significantly reduced from predicted.  5/8/2024 spirometry is normal stable compared to prior normal spirometry.    5/2/2024 CT chest without contrast revealed stable moderate pulmonary fibrosis. No acute infiltrate.    5/8/2024 Patient is especially concerned about pulmonary fibrosis since medical team at [facility] told her she may need medication, reviewed stable not progressive findings on recent follow up CT chest.   Arranged follow up with pulmonologist to review cpft and CT chest and discuss pulmonary fibrosis.       4/15/2024 Sravanthi BENTLEY  Shireen Felix here for acute care visit related to cough with COPD/bronchitis flare over past 3 weeks.   3/26/2024 prescribed Zyvox 600 mg  4/5/2024 prescribed Zithromax 500 mg, Prednisone 20 mg x 10 days, then 10 mg x 20 days.      4/15/2024 chest xray chronic interstitial findings. No acute infiltrates.     Current maintenance treatment: Trelegy 200 mcg, Albuterol inhaler. Albuterol nebs. 3% NACL nebs 2-3 times a day, begin with twice daily progress to 3Xdaily if tolerated.   (See receipt per discharge instructions).     4/5/2024 began Prednisone  30 day taper, complete  4/5/2024 Add on: AirSupra Albuterol/budesonide inhaler, continue current maintenance treatment, maximize 3% nebs 3-4 times a day.   Attempt home collect sputums gram stain and afb     5/4/2023 PSG no ryan AHI 2.9.  SpO2 was below 88% for 2.4 minutes.    1/3/2024 Sravanthi BENTLEY  Shireen Felix here for acute care visit related to cough with frequent COPD/Bronchitis exacerbations since November 25, 2023. With hospital visits, 11/25/23, 12/18/23,  12/27/23. And 12/22/23 primary care provider visit.  3 courses antibiotic last cefdinir. Patient has antibiotic allergies to PCN, Doxy, Sulfa.  She reports same continued cough since late November 2023 with continued chest congestion.  Current regimen: Trelegy 200 mcg. Albuterol inhaler. Albuterol nebs. Daliresp.   Begin Levaquin 500 mg x 10 days, prednisone 20 mg x 10 days, then 10 mg x 20 days.  3% NACL nebs 2-3 times a day, begin with twice daily progress to 3Xdaily if tolerated.   (See receipt per discharge instructions).   Continue Albuterol nebs twice daily.     1/3/2024 chest xray chronic ILD findings with scarring otherwise lungs clear.        5/16/2023 Sravanthi BENTLEY  Shireen Felix female here for follow up on asthma review cpft/cxr/FeNO.     5/16/2023 CPFT Spirometry is normal. Lung volumes are normal. Mild reduced diffusing capacity. MVV is mildly decreased    5/16/2023 FeNO is 11, no inflammation of lungs.     5/16/2023 chest x-ray chronic interstitial findings, stable.    Current regimen: Trelegy 200 mcg. Albuterol inhaler. Albuterol nebs. Daliresp.     Patient reports stable no increased cough.  Areas complaint of chronic fatigue.  Her complaint of chronic fatigue pain.  Patient was worked up for sleep apnea no sleep apnea detected no significant hypoxemia seen during sleep.  She has complaint of noticing elevations in her blood pressure which she recently went to the emergency room on 05/13/2023.  She was noted to have mild congestive heart  failure with elevated BNP and lower than normal sodium.  She has follow-up with Cardiology 5/17/2023.     4/26/2023 Sravanthi BENTLEY   Shireen Felix is a 78 y.o. female presents for follow up on asthma, SOB, Chronic UIP, chronic bronchitis, pulmonary nodules with review CT chest and 6MWD.  She also has been referred by neurology department for sleep apnea evaluation.     She has complaint fall yesterday with right rib pain. 4/26/2023 CT chest healing 7th and 8th rib fx, no pneumothorax.     She states her neurologist wanted her to have sleep study, Susan Contreras NP ordered sleep study on 4/12/2023 awaiting scheduling with sleep lab.     4/26/2023 CT Chest There are noncalcified pulmonary nodules in both lungs.  One of the larger ones measures 5 mm and is located in the right lower lobe.  On the prior examination it measured 6 mm.  There is a mild amount of honeycombing in the peripheral portion of both lungs.  There is no pneumothorax or pleural effusion.    10/26/2022 CPFT Normal spirometry. Normal lung volumes. Moderate reduction in diffusion capacity.     She is followed in pulmonary for chronic UIP, moderate persistent asthma with chronic bronchitis.     Patients reports stable NYHA II dyspnea     + covid 19 12/31/2021, 2/24/2022, July 2022.     The patient does not have currently have symptoms.     She is remains improved since on Trelegy 200 mcg      Her current respiratory therapy regimen is TRELEGY 200 mcg. VENTOLIN inhaler, albuterol nebs, Daliresp 500 mcg.  She is  adherent with her regimen.      42 pack year former smoker. Quit 2004.         Asthma Control Test  In the past 4  weeks, how much of the time did your asthma keep you from getting as much done at work, school or at home?: All of the time  During the past 4 weeks, how often have you had shortness of breath?: More than once a day  During the past 4 weeks, how often did your asthma symptoms (wheezing, couging, shortness of breath, chest tightness or  pain) wake you up at night or earlier than usual in the morning?: 4 or more nights a week  During the past 4 weeks, how often have you used your rescue inhaler or nebulizer medication (such as albuterol)?: 3 or more times per day  How would you rate your asthma control during the past 4 weeks?: Poorly controlled  If your score is 19 or less, your asthma may not be under control: 6      Immunization status reviewed and up to date.      Patient has been referred by Susan Contreras NP, Neurology for sleep apnea evaluation.   Sleep Apnea evaluation  Patient has been observed snoring with frequent awakening. History of seizures, stroke, cerebral aneurysm with repair.   Patient reports non restful' sleep.  She denies morning headache.   She reports day time napping.  Day time napping duration 15 Minutes  She denies recent weight gain.  Cardiovascular risk factors: hypertension  Bed time is 1000, 1200  Wake time is 0500  Sleep onset is within  1 Hour.  Sleep maintenance difficulties related to early morning awakening, frequent night time awakening, difficulty falling asleep, and non-restful sleep  Wake after sleep onset occurs five times a night.  Nocturia occurs one time a night,   Sleep aids : Yes, melatonin  Dry mouth : No  Sleep walking: No  Sleep talking : No  Sleep eating:No  Vivid Dreams : No  Cataplexy : No    [place] Sleepiness Scale       4/26/2023    10:25 AM   EPWORTH SLEEPINESS SCALE   Sitting and reading 1   Watching TV 3   Sitting, inactive in a public place (e.g. a theatre or a meeting) 0   As a passenger in a car for an hour without a break 0   Lying down to rest in the afternoon when circumstances permit 2   Sitting and talking to someone 0   Sitting quietly after a lunch without alcohol 1   In a car, while stopped for a few minutes in traffic 0   Total score 7       Neck circumference is 13.  Mallampati score 2    STOP - BANG Questionnaire:   1. Snoring : Do you snore loudly ?     YES  2. Tired : Do you  often feel tired, fatigued, or sleepy during daytime?   YES  3. Observed: Has anyone observed you stop breathing during your sleep?    NO  4. Blood pressure : Do you have or are you being treated for high blood pressure?    YES  5. BMI :BMI more than 35 kg/m2?   NO  6. Age : Age over 50 yr old?    YES  7. Neck circumference: Neck circumference greater than 40 cm?   NO  8. Gender: Gender male?   NO    SCORE: 4    High risk of MEENA: Yes 5 - 8  Intermediate risk of MEENA: Yes 3 - 4  Low risk of MEENA: Yes 0 - 2       A full  review of systems, past , family  and social histories was performed except as mentioned in the note above, these are non contributory to the main issues discussed today.     Previous Report Reviewed: lab reports, office notes and radiology reports     The following portions of the patient's history were reviewed and updated as appropriate: She  has a past medical history of Abnormal ECG (8/5/2019), Aneurysm, Anticoagulant long-term use, Arthritis, CAD in native artery (8/5/2019), COPD (chronic obstructive pulmonary disease), Diabetes mellitus, Fall (10/10/2019), Glaucoma, Hypertension, Seizures, Shingles (05/27/2017), and Stroke.  She  has a past surgical history that includes Brain surgery; Hysterectomy; Transforaminal epidural injection of steroid (Bilateral, 07/23/2019); Cardiac catheterization; Coronary angioplasty; Epidural steroid injection into lumbar spine (Bilateral, 11/12/2019); Injection of anesthetic agent around medial branch nerves innervating lumbar facet joint (Bilateral, 01/31/2020); Transforaminal epidural injection of steroid (Bilateral, 03/10/2020); Transforaminal epidural injection of steroid (Right, 06/19/2020); Injection of joint (Bilateral, 07/09/2020); Injection of anesthetic agent into sacroiliac joint (Right, 11/16/2021); Selective injection of anesthetic agent around lumbar spinal nerve root by transforaminal approach (Bilateral, 04/26/2023); Injection of joint (Bilateral,  07/28/2023); Injection of anesthetic agent into sacroiliac joint (Bilateral, 07/28/2023); Colonoscopy (N/A, 09/21/2023); Oophorectomy; Injection of joint (N/A, 10/25/2023); and Epidural steroid injection into cervical spine (N/A, 2/7/2024).  Her family history includes Breast cancer in her maternal aunt, maternal aunt, and maternal aunt; No Known Problems in her father and mother.  She  reports that she quit smoking about 20 years ago. Her smoking use included cigarettes. She started smoking about 62 years ago. She has a 42.3 pack-year smoking history. She has quit using smokeless tobacco. She reports that she does not drink alcohol and does not use drugs.  She has a current medication list which includes the following prescription(s): albuterol, albuterol, albuterol-budesonide, atorvastatin, carvedilol, cholecalciferol (vitamin d3), cholestyramine, clopidogrel, denosumab, diltiazem, dorzolamide-timolol 2-0.5%, empagliflozin, escitalopram oxalate, fluocinonide, fluticasone propionate, trelegy ellipta, gabapentin, ipratropium, ketoconazole, latanoprost, levetiracetam, magnesium oxide, montelukast, montelukast, myrbetriq, omeprazole, ondansetron, pantoprazole, potassium chloride sa, ranolazine, roflumilast, entresto, sodium chloride 3%, spironolactone, tezepelumab-ekko, tizanidine, triamcinolone acetonide 0.025%, aptiom, dicyclomine, fluconazole, furosemide, hydrocodone-acetaminophen, hydroxyzine hcl, ibuprofen, isosorbide mononitrate, linezolid, mucus clearing device, nitroglycerin, and prednisone.  She is allergic to penicillins, adhesive, doxycycline, oxycodone, and sulfa (sulfonamide antibiotics)..    Review of Systems   Constitutional:  Negative for fever, chills and fatigue.   HENT:  Positive for hearing loss. Negative for nosebleeds, rhinorrhea and congestion.    Eyes:  Negative for redness.   Respiratory:  Positive for snoring, dyspnea on extertion and use of rescue inhaler. Negative for cough, sputum  "production, choking and wheezing.    Genitourinary:  Negative for hematuria.   Endocrine: Diabetes melllitus Negative for cold intolerance.    Musculoskeletal:  Positive for arthralgias and back pain.   Skin:  Negative for rash.   Gastrointestinal:  Positive for acid reflux. Negative for vomiting.   Neurological:  Negative for syncope and headaches.        History of stroke  Seizure disorder.  Post herpetic neuralgia   Hematological:  Negative for adenopathy. Bleeds easily and excessive bruising.   Psychiatric/Behavioral:  Negative for confusion.         Objective:     BP (!) 140/70   Pulse 105   Resp 18   Ht 5' 4" (1.626 m)   Wt 47.4 kg (104 lb 8 oz)   LMP  (LMP Unknown)   SpO2 100%   BMI 17.94 kg/m²   Body mass index is 17.94 kg/m².     Physical Exam  Vitals and nursing note reviewed.   Constitutional:       General: She is not in acute distress.     Appearance: Normal appearance. She is well-developed. She is not ill-appearing or toxic-appearing.   HENT:      Head: Normocephalic and atraumatic.      Right Ear: Tympanic membrane and external ear normal.      Left Ear: Tympanic membrane and external ear normal.      Nose: Nose normal.      Mouth/Throat:      Mouth: Mucous membranes are moist.      Tongue: No lesions.      Pharynx: Oropharynx is clear. Uvula midline. No oropharyngeal exudate.      Tonsils: No tonsillar exudate or tonsillar abscesses.   Eyes:      Conjunctiva/sclera: Conjunctivae normal.      Pupils: Pupils are equal, round, and reactive to light.   Cardiovascular:      Rate and Rhythm: Normal rate and regular rhythm.      Heart sounds: Normal heart sounds. No murmur heard.  Pulmonary:      Effort: Pulmonary effort is normal. No respiratory distress.      Breath sounds: No stridor. No wheezing or rales.      Comments: Mild velcro breath sounds in posterior bases   Abdominal:      General: Bowel sounds are normal.      Palpations: Abdomen is soft.   Musculoskeletal:         General: No " tenderness. Normal range of motion.      Cervical back: Normal range of motion and neck supple.   Lymphadenopathy:      Cervical: No cervical adenopathy.   Skin:     General: Skin is warm and dry.      Coloration: Skin is not pale.   Neurological:      Mental Status: She is alert and oriented to person, place, and time.   Psychiatric:         Behavior: Behavior normal. Behavior is cooperative.         Thought Content: Thought content normal.         Judgment: Judgment normal.         Personal Diagnostic Review    5/8/2024 Normal spirometry. No improvement following bronchodilator but this does not preclude a clinical application     5/8/2024 6MWD No desaturations requiring supplemental oxygen at rest or exertion.   Phase Oxygen Assessment Supplemental O2 Heart   Rate Blood Pressure Paulino Dyspnea Scale Rating   Resting 98 % Room Air 104 bpm 133/62 4   Exercise             Minute             1 98 % Room Air 111 bpm       2 100 % Room Air 111 bpm       3 99 % Room Air 115 bpm       4 98 % Room Air 119 bpm       5 98 % Room Air 115 bpm       6  98 % Room Air 113 bpm 106/59 7-8   Recovery             Minute             1 98 % Room Air 108 bpm       2 98 % Room Air 106 bpm       3 100 % Room Air 105 bpm       4 100 % Room Air 105 bpm 140/70 5-6      Six Minute Walk Summary  6MWT Status: completed with stops  Patient Reported: Dizziness, Dyspnea, Lightheadedness (back, neck pain)                 Interpretation:  Did the patient stop or pause?: No  Total Time Walked (Calculated): 360 seconds  Final Partial Lap Distance (feet): 25 feet  Total Distance Meters (Calculated): 68.58 meters  Predicted Distance Meters (Calculated): 444.92 meters  Percentage of Predicted (Calculated): 15.41  Peak VO2 (Calculated): 6.04  Mets: 1.73  Has The Patient Had a Previous Six Minute Walk Test?: Yes     Previous 6MWT Results  Has The Patient Had a Previous Six Minute Walk Test?: Yes  Date of Previous Test: 04/26/23  Total Time Walked: 360  seconds  Total Distance (meters): 198.12  Predicted Distance (meters): 430.2 meters  Percentage of Predicted: 46.05  Percent Change (Calculated): 0.65        5/16/2023 complete PFT Spirometry is normal. Spirometry remains unimproved following bronchodilator. Lung volume determination is normal. Airway mechanics show normal airway resistance and conductance. DLCO is mildly decreased. MVV is mildly decreased    5/16/2023 FeNO is 11, no inflammation of lungs.   Clinical Guide to Interpretation or FeNO Levels :     FeNO  (ppb) LOW INTERMEDIATE HIGH   ADULT VALUES < 25 25-50          > 50   Th2-driven Inflammation Unlikely Likely Significant      Patients FeNO level at this visit : __11__ (ppb)     Interpretation of FeNO measurement in adults:  [x] FENO is less than 25 ppb implies non eosinophilic airway inflammation or the absence of airway inflammation.               Comment: Low FENO (<25 ppb) in adult asthmatics with persistent symptoms suggests other etiologies for these symptoms and a lower likelihood of benefit from adding or increasing inhaled glucocorticoids.    4/26/2023 6MWD No desaturations requiring supplemental oxygen at rest or exertion.  Exercise capacity is less than predicted  Phase Oxygen Assessment Supplemental O2 Heart   Rate Blood Pressure Paulino Dyspnea Scale Rating   Resting 100 % Room Air 61 bpm 173/81 1   Exercise             Minute             1 100 % Room Air 67 bpm       2 100 % Room Air 70 bpm       3 100 % Room Air 69 bpm       4 100 % Room Air 69 bpm       5 100 % Room Air 69 bpm       6  100 % Room Air 69 bpm 186/76 3   Recovery             Minute             1 100 % Room Air 65 bpm       2 100 % Room Air 64 bpm       3 100 % Room Air 62 bpm       4 100 % Room Air 63 bpm 176/76 3      Six Minute Walk Summary  6MWT Status: completed without stopping  Patient Reported: Dyspnea, Leg pain (hip pain)             Interpretation:  Did the patient stop or pause?: No  Total Time Walked (Calculated):  360 seconds  Final Partial Lap Distance (feet): 50 feet  Total Distance Meters (Calculated): 198.12 meters  Predicted Distance Meters (Calculated): 430.2 meters  Percentage of Predicted (Calculated): 46.05  Peak VO2 (Calculated): 9.92  Mets: 2.83  Has The Patient Had a Previous Six Minute Walk Test?: No     Previous 6MWT Results  Has The Patient Had a Previous Six Minute Walk Test?: No      10/26/2022 CPFT Normal spirometry. Normal lung volumes. Moderate reduction in diffusion capacity - unadjusted for hemoglobin. (DLCO > or equal to 40% and < 60% predicted). This interpretation of diffusing capacity does not take into account the patient's hemoglobin level (Unavailable at the time of testing - hemoglobin assumed to be normal). Flow volume loops demonstrate a restrictive defect.     CTA Head and Neck (xpd)  Narrative: EXAMINATION:  CTA HEAD AND NECK (XPD)    CLINICAL HISTORY:  Hematologic malignancy, monitor;Neuro deficit, acute, stroke suspected;Syncope, recurrent;Other symptoms and signs involving the nervous system    TECHNIQUE:  Non contrast low dose axial images were obtained through the head. CT angiogram was performed from the level of the nilson to the top of the head following the IV administration of 75mL of Omnipaque 350.   Sagittal and coronal reconstructions and maximum intensity projection reconstructions were performed. Arterial stenosis percentages are based on NASCET measurement criteria.    All CT scans at this location are performed using dose optimization techniques including the following: Automated exposure control; adjustment of the mA and/or kv; use of iterative reconstruction technique.    COMPARISON:  CTA dated 10/27/2021.  CT dated 05/22/2023    FINDINGS:  Intracranial Compartment:    Ventricles and sulci are normal in size for age without evidence of hydrocephalus. No extra-axial blood or fluid collections.    Mild hypoattenuation scattered in the cerebral white matter.  Aneurysm clip  noted along the left MCA.  No parenchymal mass, hemorrhage, edema, or major vascular distribution infarct.    Skull/Extracranial Contents (limited evaluation): Bilateral pterional craniotomies again noted.  Calvarium is otherwise intact without acute or aggressive abnormality.  Postsurgical appearance of the bilateral orbital lens.  Orbits and globes otherwise within normal limits.  Paranasal sinuses and mastoid air cells are clear.    Non-Vascular Structures of the Neck/Thoracic Inlet (limited evaluation): Nonvascular soft tissues of the neck are unremarkable.  No acute or aggressive bony abnormality.  Degenerative changes noted in the spine with mild anterolisthesis of C3 on C4 and C4 on C5.  Mild paraseptal emphysema in the upper lungs which are otherwise clear.    Aorta: Normal 3 vessel arch.  Aortic arch atherosclerosis without origin stenosis of the great vessels.    Extracranial carotid circulation: No hemodynamically significant stenosis, aneurysmal dilatation, or dissection.  Mild atherosclerosis in the carotid bifurcations and carotid bulbs without flow-limiting stenosis.    Extracranial vertebral circulation: No hemodynamically significant stenosis, aneurysmal dilatation, or dissection.  Right dominant vertebral artery.    Intracranial Arteries: No focal high-grade stenosis, occlusion, or aneurysm.  Mild bilateral carotid siphon atherosclerosis without flow-limiting stenosis.  Left MCA aneurysm clip is unchanged with no significant residual or recurrent aneurysm filling.    Venous structures (limited evaluation): Normal.  Impression: No acute intracranial abnormality.  Mild nonspecific white matter changes likely related to chronic microvascular ischemia.    Left MCA aneurysm clip appears unchanged with no evidence of residual or recurrent aneurysm filling.    Otherwise, no significant stenosis, occlusion, dissection, aneurysm, or vascular malformation.    Electronically signed by: Elie  Zuleyma  Date:    2024  Time:    09:35  CT Chest Without Contrast  Narrative: EXAMINATION:  CT CHEST WITHOUT CONTRAST    CLINICAL HISTORY:  Mucopurulent chronic bronchitisCough, persistent;Interstitial lung disease;    TECHNIQUE:  Standard noncontrast CT of the chest.  All CT scans at this facility use dose modulation, iterative reconstruction and/or weight based dosing when appropriate to reduce radiation dose to as low as reasonably achievable.    COMPARISON:  Chest CT, 2024.    FINDINGS:  Stable moderate fibrosis of the lungs with a basilar predominance.    No consolidation.  No acute infiltrate.    Heart is normal in size.  Coronary artery calcifications are present.  No pericardial effusions.  No pleural effusions.    No significant osseous abnormality.  Impression: Stable moderate bilateral pulmonary fibrosis.  No acute infiltrate    Electronically signed by: Tommy Maravilla MD  Date:    2024  Time:    09:15      Pulmonary function tests:20 :  FEV1: 2.13 (103.5 % predicted), FVC:  2.44 (91.0 % predicted), FEV1/FVC:  87.75, T.47 ( 90.1 % predicted) RV /TLC 45 ( 102% predicted, DLCO: 14.51 (70.2 % predicted)  Normal spirometry. Lung volumes not done. Diffusion capacity is mildly reduced but corrects for alveolar volume.       Assessment / Plan:       Discussed diagnosis, its evaluation, treatment and usual course. All questions answered.  Requested Prescriptions     Signed Prescriptions Disp Refills    mucus clearing device (AEROBIKA OSCILLATING PEP SYSTM) 1 each 0     Sig: by Misc.(Non-Drug; Combo Route) route 4 (four) times daily.       Problem List Items Addressed This Visit       Mucopurulent chronic bronchitis (Chronic)    Relevant Medications    mucus clearing device (AEROBIKA OSCILLATING PEP SYSTM)    Other Relevant Orders    Ambulatory referral/consult to Pulmonary Rehab    Complete PFT with bronchodilator    Severe persistent asthma without complication    Relevant Orders     Ambulatory referral/consult to Pulmonary Rehab    Ambulatory referral/consult to Pulmonary Disease Management w/ Respiratory Therapist    Complete PFT with bronchodilator    Pulmonary interstitial fibrosis - Primary    Relevant Orders    Ambulatory referral/consult to Pulmonary Rehab    Ambulatory referral/consult to Pulmonary Disease Management w/ Respiratory Therapist    Complete PFT with bronchodilator     Other Visit Diagnoses       Bronchiectasis without complication        Relevant Medications    mucus clearing device (AEROBIKA OSCILLATING PEP SYSTM)    Other Relevant Orders    Ambulatory referral/consult to Pulmonary Disease Management w/ Respiratory Therapist    Complete PFT with bronchodilator            5/2/2024 CT chest stable pulmonary fibrosis, no acute infiltrates. Patient is especially concerned about pulmonary fibrosis since medical team at [facility] told her she may need medication, reviewed stable not progressive findings on recent follow up CT chest. Arranged follow up with pulmonologist to review cpft and CT chest and discuss.     CHF management with cardiology, maximize   Continued follow up with allergist control allergic trigger possible consideration for targeted therapy to improve asthma control.   Over all cough improved with treatment chf.   Continue trelegy 200 mcg, Albuterol inhaler. Albuterol nebs. Singulair.   Continue 3% saline determine as needed thick mucous, currently stable.   Continue Albuterol nebs 2 times daily  5/8/2024Spirometry is normal.  5/8/2024 6mwd No desaturations requiring supplemental oxygen at rest or exertion. Exercise capacity less than predicted.   5/4/2023 PSG no ryan AHI 2.9.  SpO2 was below 88% for 2.4 minutes.  Referral to pul rehab for conditioning.       I spent a total of 42 minutes on the day of the visit.  This includes face to face time and non-face to face time preparing to see the patient (eg, review of tests), obtaining and/or reviewing separately  obtained history, documenting clinical information in the electronic or other health record, independently interpreting results and communicating results to the patient/family/caregiver, or care coordinator.      Follow up for  Pul fibrosis cpft with pulmonlogist .

## 2024-05-08 NOTE — PROGRESS NOTES
Subjective:   Patient ID:  Shireen Felix is a 78 y.o. female who presents for evaluation of No chief complaint on file.      HPI  78-year-old female, with history of CAD possible ischemic cardiomyopathy.  She had a stent she states more than 10 years ago possibly to the LAD.  Her LV EF has been in up and down with the past few years between 35 40% and 50%.    She also has pulmonary fibrosis moderate at least by CT scan.  She follows with Pulmonary.  However she states that her oxygen is 100% it does not drop with exertion.  She has to stop as soon as he walks due to chest heaviness radiating up to her neck.  This is happening with a very low level of activity CCS 3-4.  She has a an abundant amount of coronary artery calcification on her CT scan.    She is maxed out on antianginals almost.    Has dyspnea on exertion.  Was recently had [place] for CHF.  Her BNP was elevated.    However today she looks good at rest.  Her lungs has minimal crackles possibly secondary to her long history of pulmonary fibrosis.    Past Medical History:   Diagnosis Date    Abnormal ECG 8/5/2019    Aneurysm     Anticoagulant long-term use     Arthritis     CAD in native artery 8/5/2019    COPD (chronic obstructive pulmonary disease)     Diabetes mellitus     Fall 10/10/2019    Formatting of this note might be different from the original. Found sitting on floor next to bed last night Mild confusion today Does not recall falling or how she ended up on floor UA today Labs yesterday unremarkable    Glaucoma     Hypertension     Seizures     Shingles 05/27/2017    Stroke        Past Surgical History:   Procedure Laterality Date    BRAIN SURGERY      CARDIAC CATHETERIZATION      COLONOSCOPY N/A 09/21/2023    Procedure: COLONOSCOPY;  Surgeon: Joanna Leal MD;  Location: [facility];  Service: Endoscopy;  Laterality: N/A;    CORONARY ANGIOPLASTY      EPIDURAL STEROID INJECTION INTO CERVICAL SPINE N/A 2/7/2024    Procedure:  Cervical IL XANDER, Level C6/7, RN IV sedation;  Surgeon: Francisco Oshea MD;  Location: [facility];  Service: Pain Management;  Laterality: N/A;    EPIDURAL STEROID INJECTION INTO LUMBAR SPINE Bilateral 11/12/2019    Procedure: TF XANDER L4/5;  Surgeon: Desean Dias MD;  [facility];  Service: Pain Management;  Laterality: Bilateral;    HYSTERECTOMY      INJECTION OF ANESTHETIC AGENT AROUND MEDIAL BRANCH NERVES INNERVATING LUMBAR FACET JOINT Bilateral 01/31/2020    Procedure: Bilateral L3-5 MBB;  Surgeon: Desean Dias MD;  Location: [facility];  Service: Pain Management;  Laterality: Bilateral;    INJECTION OF ANESTHETIC AGENT INTO SACROILIAC JOINT Right 11/16/2021    Procedure: Right BLOCK, SACROILIAC JOINT Right GTB with RN IV sedation;  Surgeon: Yao Fulton MD;  Location: [facility];  Service: Pain Management;  Laterality: Right;    INJECTION OF ANESTHETIC AGENT INTO SACROILIAC JOINT Bilateral 07/28/2023    Procedure: Bilateral GT bursa + bilateral SIJ injection;  Surgeon: Francisco Oshea MD;  Location: [facility];  Service: Pain Management;  Laterality: Bilateral;    INJECTION OF JOINT Bilateral 07/09/2020    Procedure: Bilateral shoulder GH Joint injection with local;  Surgeon: Desean Dias MD;  Location: [facility];  Service: Pain Management;  Laterality: Bilateral;    INJECTION OF JOINT Bilateral 07/28/2023    Procedure: Bilateral GT bursa + bilateral SIJ injection NEEDS ADDITIONAL SEDATION AND MORE TIME BEFORE INJECTION RN IV Sedation;  Surgeon: Francisco Oshea MD;  Location: [facility];  Service: Pain Management;  Laterality: Bilateral;    INJECTION OF JOINT N/A 10/25/2023    Procedure: Sacrococcygeal joint injection;  Surgeon: Francisco Oshea MD;  [facility];  Service: Pain Management;  Laterality: N/A;    OOPHORECTOMY      SELECTIVE INJECTION OF ANESTHETIC AGENT AROUND LUMBAR SPINAL NERVE ROOT BY TRANSFORAMINAL APPROACH Bilateral 04/26/2023    Procedure:  Bilateral L4/5 TF XANDER RN IV Sedation;  Surgeon: Francisco Oshea MD;  Location: [facility];  Service: Pain Management;  Laterality: Bilateral;    TRANSFORAMINAL EPIDURAL INJECTION OF STEROID Bilateral 2019    Procedure: Bilateral L3/4 Transforaminal Epidural Steroid Injection;  Surgeon: Desean Dias MD;  Location: [facility];  Service: Pain Management;  Laterality: Bilateral;    TRANSFORAMINAL EPIDURAL INJECTION OF STEROID Bilateral 03/10/2020    Procedure: Right T12/L1 TF XANDER with local;  Surgeon: Desean Dias MD;  Location: [facility];  Service: Pain Management;  Laterality: Bilateral;    TRANSFORAMINAL EPIDURAL INJECTION OF STEROID Right 2020    Procedure: Right T12/L1 TFESI Covid day of procedure;  Surgeon: Desean Dias MD;  Location: [facility];  Service: Pain Management;  Laterality: Right;       Social History     Tobacco Use    Smoking status: Former     Current packs/day: 0.00     Average packs/day: 1 pack/day for 42.3 years (42.3 ttl pk-yrs)     Types: Cigarettes     Start date:      Quit date: 2004     Years since quittin.0    Smokeless tobacco: Former   Substance Use Topics    Alcohol use: No    Drug use: Never       Family History   Problem Relation Name Age of Onset    No Known Problems Mother      No Known Problems Father      Breast cancer Maternal Aunt      Breast cancer Maternal Aunt      Breast cancer Maternal Aunt         Review of Systems   Cardiovascular:  Positive for chest pain and dyspnea on exertion. Negative for palpitations and syncope.   Genitourinary: Negative.    Neurological: Negative.        Current Outpatient Medications on File Prior to Visit   Medication Sig    albuterol (PROVENTIL) 2.5 mg /3 mL (0.083 %) nebulizer solution EMPTY 1 VIAL INTO NEBULIZER EVERY 4 HOURS AS NEEDED FRO RESCUE    albuterol (PROVENTIL/VENTOLIN HFA) 90 mcg/actuation inhaler INHALE TWO PUFFS INTO THE LUNGS EVERY 4 HOURS AS NEEDED    albuterol-budesonide  (AIRSUPRA) 90-80 mcg/actuation Inhale 2 puffs into the lungs every 4 (four) hours as needed (wheezing, cough).    APTIOM 400 mg Tab tablet Take 400 mg by mouth.    atorvastatin (LIPITOR) 40 MG tablet TAKE 1 TABLET BY MOUTH ONCE DAILY    carvediloL (COREG) 12.5 MG tablet Take 1 tablet (12.5 mg total) by mouth 2 (two) times daily with meals.    cholecalciferol, vitamin D3, 1,250 mcg (50,000 unit) capsule Take 1 capsule (50,000 Units total) by mouth every 7 days.    cholestyramine (QUESTRAN) 4 gram packet TAKE 1 PACKET BY MOUTH TWICE DAILY AS DIRECTED    clopidogreL (PLAVIX) 75 mg tablet Take 1 tablet (75 mg total) by mouth once daily.    denosumab (PROLIA) 60 mg/mL Syrg every 6 (six) months.    dicyclomine (BENTYL) 10 MG capsule Take 1 capsule (10 mg total) by mouth 2 (two) times daily.    diltiaZEM (CARDIZEM CD) 120 MG Cp24 Take 1 capsule (120 mg total) by mouth once daily.    dorzolamide-timolol 2-0.5% (COSOPT) 22.3-6.8 mg/mL ophthalmic solution Place 1 drop into both eyes every 12 (twelve) hours.    empagliflozin (JARDIANCE) 10 mg tablet Take 1 tablet (10 mg total) by mouth once daily.    EScitalopram oxalate (LEXAPRO) 20 MG tablet TAKE 1 TABLET BY MOUTH ONCE DAILY    fluocinonide (LIDEX) 0.05 % external solution Apply topically 2 (two) times daily. Use on itchy spots on scalp and ear    fluticasone propionate (FLONASE) 50 mcg/actuation nasal spray USE 2 SPRAYS INTO EACH NOSTRIL ONCE A DAY AT 6AM AS DIRECTED    fluticasone-umeclidin-vilanter (TRELEGY ELLIPTA) 200-62.5-25 mcg inhaler INHALE 1 PUFF INTO THE LUNGS DAILY    gabapentin (NEURONTIN) 300 MG capsule Take 1 capsule (300 mg total) by mouth 3 (three) times daily.    hydrOXYzine HCL (ATARAX) 25 MG tablet TAKE 1 TABLET BY MOUTH EVERY EVENING    ipratropium (ATROVENT) 42 mcg (0.06 %) nasal spray USE 2 SPRAYS INTO EACH NOSTRIL FOUR TIMES DAILY    ketoconazole (NIZORAL) 2 % cream Apply topically 2 (two) times daily. For dry flaky patches of ear.    latanoprost  0.005 % ophthalmic solution Place 1 drop into both eyes every evening.    levETIRAcetam (KEPPRA) 750 MG Tab Take 1 tablet (750 mg total) by mouth 2 (two) times daily.    linezolid (ZYVOX) 600 mg Tab Take 1 tablet (600 mg total) by mouth every 12 (twelve) hours.    magnesium oxide (MAG-OX) 400 mg (241.3 mg magnesium) tablet Take 1 tablet (400 mg total) by mouth once daily.    montelukast (SINGULAIR) 10 mg tablet Take 1 tablet (10 mg total) by mouth once daily.    montelukast (SINGULAIR) 10 mg tablet Take 1 tablet (10 mg total) by mouth once daily.    mucus clearing device (AEROBIKA OSCILLATING PEP SYSTM) by Misc.(Non-Drug; Combo Route) route 4 (four) times daily.    omeprazole (PRILOSEC) 40 MG capsule Take 40 mg by mouth every morning.    ondansetron (ZOFRAN-ODT) 4 MG TbDL Take 1 tablet (4 mg total) by mouth every 6 (six) hours as needed.    pantoprazole (PROTONIX) 40 MG tablet Take 40 mg by mouth 2 (two) times daily.    potassium chloride SA (K-DUR,KLOR-CON) 20 MEQ tablet Take 1 tablet (20 mEq total) by mouth once daily. Take extra dose with Lasix - fluid pill    ranolazine (RANEXA) 1,000 mg Tb12 Take 1 tablet (1,000 mg total) by mouth 2 (two) times daily.    roflumilast (DALIRESP) 500 mcg Tab TAKE ONE TABLET BY MOUTH DAILY    sacubitriL-valsartan (ENTRESTO) 24-26 mg per tablet Take 1 tablet by mouth 2 (two) times daily.    sodium chloride 3% 3 % nebulizer solution Take 4 mLs by nebulization as needed for Cough (chest congestion).    spironolactone (ALDACTONE) 50 MG tablet Take 1 tablet (50 mg total) by mouth once daily.    tezepelumab-ekko 210 mg/1.91 mL (110 mg/mL) PnIj Inject 210 mg into the skin every 28 days.    tiZANidine (ZANAFLEX) 4 MG tablet Take 4 mg by mouth every 8 (eight) hours.    triamcinolone acetonide 0.025% (KENALOG) 0.025 % cream Apply topically 2 (two) times daily. PRN rash and itching of ear. Mild steroid cream.    [DISCONTINUED] furosemide (LASIX) 40 MG tablet Take 1 tablet (40 mg total) by  mouth daily as needed (for edema, swelling, fluid build up, or 3 pound weight gain in 24 hours).    [DISCONTINUED] isosorbide mononitrate (IMDUR) 60 MG 24 hr tablet Take 1 tablet (60 mg total) by mouth every evening.    [DISCONTINUED] nitroGLYCERIN (NITROSTAT) 0.4 MG SL tablet DISSOLVE 1 TABLET UNDER TONGUE EVERY 5 MINUTES FOR CHEST PAIN (MAY TAKE UP TO 3 DOSES THEN SEEK MEDICAL ATTENTION)    fluconazole (DIFLUCAN) 150 MG Tab Take 1 tablet (150 mg total) by mouth once daily. (Patient not taking: Reported on 5/2/2024)    HYDROcodone-acetaminophen (NORCO) 5-325 mg per tablet Take 1 tablet by mouth every 6 (six) hours as needed for Pain. (Patient not taking: Reported on 5/8/2024)    ibuprofen (ADVIL,MOTRIN) 800 MG tablet Take 1 tablet (800 mg total) by mouth 3 (three) times daily. (Patient not taking: Reported on 5/8/2024)    MYRBETRIQ 50 mg Tb24 TAKE 1 TABLET BY MOUTH ONCE DAILY (Patient not taking: Reported on 5/8/2024)    predniSONE (DELTASONE) 10 MG tablet Take 1 tablet (10 mg total) by mouth once daily. (Patient not taking: Reported on 5/8/2024)     No current facility-administered medications on file prior to visit.       Objective:   Objective:  Wt Readings from Last 3 Encounters:   05/08/24 47.4 kg (104 lb 8 oz)   05/08/24 47.4 kg (104 lb 8 oz)   05/08/24 47.4 kg (104 lb 8 oz)     Temp Readings from Last 3 Encounters:   05/02/24 98.2 °F (36.8 °C) (Tympanic)   04/29/24 98.6 °F (37 °C)   04/15/24 97.8 °F (36.6 °C)     BP Readings from Last 3 Encounters:   05/08/24 123/67   05/08/24 (!) 140/70   05/03/24 132/84     Pulse Readings from Last 3 Encounters:   05/08/24 98   05/08/24 105   05/03/24 104       Physical Exam  Vitals reviewed.   Constitutional:       Appearance: She is well-developed.   Neck:      Vascular: No carotid bruit.   Cardiovascular:      Rate and Rhythm: Normal rate and regular rhythm.      Pulses: Intact distal pulses.      Heart sounds: Normal heart sounds. No murmur heard.  Pulmonary:       Breath sounds: Rales present.   Neurological:      Mental Status: She is oriented to person, place, and time.         Lab Results   Component Value Date    CHOL 158 05/02/2024    CHOL 142 04/25/2024    CHOL 144 08/31/2023     Lab Results   Component Value Date    HDL 48 05/02/2024    HDL 56 04/25/2024    HDL 69 08/31/2023     Lab Results   Component Value Date    LDLCALC 87.6 05/02/2024    LDLCALC 72 04/25/2024    LDLCALC 62.4 (L) 08/31/2023     Lab Results   Component Value Date    TRIG 112 05/02/2024    TRIG 72 04/25/2024    TRIG 63 08/31/2023     Lab Results   Component Value Date    CHOLHDL 30.4 05/02/2024    CHOLHDL 47.9 08/31/2023    CHOLHDL 39.7 08/14/2023       Chemistry        Component Value Date/Time     (L) 05/02/2024 1050    K 3.9 05/02/2024 1050     05/02/2024 1050    CO2 24 05/02/2024 1050    BUN 19 05/02/2024 1050    CREATININE 1.0 05/02/2024 1050     05/02/2024 1050        Component Value Date/Time    CALCIUM 9.7 05/02/2024 1050    ALKPHOS 51 (L) 11/14/2023 1348    AST 17 11/14/2023 1348    ALT 16 11/14/2023 1348    BILITOT 0.3 11/14/2023 1348    ESTGFRAFRICA 64 04/26/2024 0557    ESTGFRAFRICA >60 06/02/2022 1549    EGFRNONAA >60 06/02/2022 1549          Lab Results   Component Value Date    TSH 1.004 09/15/2023     Lab Results   Component Value Date    INR 1.0 11/05/2020    INR 1.0 08/20/2020    INR 1.0 08/05/2020     Lab Results   Component Value Date    WBC 8.38 05/02/2024    HGB 11.5 (L) 05/02/2024    HCT 37.7 05/02/2024    MCV 93 05/02/2024     05/02/2024     BNP  @LABRCNTIP(BNP,BNPTRIAGEBLO)@  Estimated Creatinine Clearance: 34.1 mL/min (based on SCr of 1 mg/dL).     Imaging:  ======    No results found for this or any previous visit.    Results for orders placed during the hospital encounter of 06/21/23    US Carotid Bilateral    Narrative  EXAMINATION:  US CAROTID BILATERAL    TECHNIQUE:  Grayscale and color Doppler ultrasound examination of the carotid and  vertebral artery systems bilaterally.  Stenosis estimates are per the NASCET measurement criteria.    COMPARISON:  None.    FINDINGS:  Right:    Internal Carotid Artery (ICA) peak systolic velocity 69 cm/sec    ICA/CCA peak systolic velocity ratio: 1.0    Plaque formation: Minimal    Vertebral artery: Antegrade flow and normal waveform.    Left:    Internal Carotid Artery (ICA)  peak systolic velocity 62 cm/sec    ICA/CCA peak systolic velocity ratio: 0.8    Plaque formation: Minimal    Vertebral artery: Antegrade flow and normal waveform.    Impression  No evidence of a hemodynamically significant carotid bifurcation stenosis.      Electronically signed by: Kristopher Velasquez MD  Date:    06/21/2023  Time:    15:13    Results for orders placed during the hospital encounter of 04/15/24    X-Ray Chest PA And Lateral    Narrative  EXAMINATION:  XR CHEST PA AND LATERAL    CLINICAL HISTORY:  Cough, unspecified    TECHNIQUE:  PA and lateral views of the chest were performed.    COMPARISON:  04/05/2024    FINDINGS:  The cardiac and mediastinal silhouettes appear within normal limits.   Diffuse coarsening of the interstitial markings remains unchanged from multiple prior examinations.  Underlying interstitial lung disease not excluded.  No focal parenchymal consolidation or definite pleural effusion demonstrated.  No acute osseous findings demonstrated.    Impression  Unchanged appearance of the chest as above      Electronically signed by: Neri Husain MD  Date:    04/15/2024  Time:    10:33    No results found for this or any previous visit.    No valid procedures specified.    No results found for this or any previous visit.      Results for orders placed during the hospital encounter of 06/02/23    Nuclear Stress - Cardiology Interpreted    Interpretation Summary    Normal myocardial perfusion scan. There is no evidence of myocardial ischemia or infarction.    The gated perfusion images showed an ejection fraction of 54%  at rest. The gated perfusion images showed an ejection fraction of 62% post stress.    There is normal wall motion at rest and post stress.    LV cavity size is normal at rest and normal at stress.    The ECG portion of the study is negative for ischemia.    The patient reported no chest pain during the stress test.      Results for orders placed during the hospital encounter of 06/02/23    Echo    Interpretation Summary  · The left ventricle is normal in size with concentric hypertrophy and low normal systolic function.  · The estimated ejection fraction is 50%.  · Normal left ventricular diastolic function.  · There is abnormal septal wall motion consistent with left bundle branch block.  · Normal right ventricular size with normal right ventricular systolic function.  · Normal central venous pressure (3 mmHg).  · The estimated PA systolic pressure is 30 mmHg.  · Mild mitral regurgitation.       CONCLUSIONS: 4.2024  1. Normal left ventricular cavity size. Moderately depressed left ventricular systolic   function. LVEF 35 - 40%.   2. Normal right ventricular size and systolic function.   3. The left atrium is normal in size.   4. Normal right atrial size and morphology.   5. Mild mitral valve regurgitation. Mitral valve leaflets appear mildly thickened.   6. Aortic valve cusps appear mildly calcified. AV peak PG 13 mmHg. AV Mean PG 7 mmHg. OLIVIA   (VTI) 1.38 cm2.   7. No or trivial tricuspid valve regurgitation.   8. Mild pulmonic valve regurgitation.   9. Normal pericardium without evidence of pericardial effusion.   10. There is mild atherosclerosis visualized in the abdominal aorta by limited views.     Diagnostic Results:  ECG: Reviewed    The ASCVD Risk score (Page DK, et al., 2019) failed to calculate for the following reasons:    The patient has a prior MI or stroke diagnosis        Assessment and Plan:   Atherosclerosis of native coronary artery of native heart with unstable angina pectoris  -      nitroGLYCERIN (NITROSTAT) 0.4 MG SL tablet; Place 1 tablet (0.4 mg total) under the tongue every 5 (five) minutes as needed for Chest pain.  Dispense: 100 tablet; Refill: 0    Acute on chronic combined systolic and diastolic congestive heart failure  -     furosemide (LASIX) 20 MG tablet; Take 1 tablet (20 mg total) by mouth daily as needed (for edema, swelling, fluid build up, or 3 pound weight gain in 24 hours).  Dispense: 30 tablet; Refill: 11  -     isosorbide mononitrate (IMDUR) 120 MG 24 hr tablet; Take 1 tablet (120 mg total) by mouth every evening.  Dispense: 90 tablet; Refill: 3    S/P coronary artery stent placement    Coronary artery disease of native artery of native heart with stable angina pectoris    Primary hypertension    Chronic diastolic heart failure    History of tobacco abuse    Orthostatic hypotension    Pulmonary interstitial fibrosis    Ischemic cardiomyopathy      Continue with Plavix.  Increase Imdur from 60 mg 120 mg.    Continue with the Ranexa.    On carvedilol and Cardizem.    Pretty much maxed out on antianginals.  With CCS 3-4.    Refill nitro p.r.n.  Go to the emergency room if symptoms fail to improve.  She had a normal nuclear stress test last year however EF is running between 35-40% with escalating angina.    Recommended right and left heart catheterization.    Discussed with her risks and benefits.  She is accompanied by family member.  She is agreeable with the procedure.  Reviewed all tests and above medical conditions with patient in detail and formulated treatment plan.  Risk factor modification discussed.   Cardiac low salt diet discussed.  Maintaining healthy weight and weight loss goals were discussed in clinic.    Follow up after heart catheterization

## 2024-05-09 ENCOUNTER — TELEPHONE (OUTPATIENT)
Dept: PULMONOLOGY | Facility: HOSPITAL | Age: 79
End: 2024-05-09
Payer: MEDICARE

## 2024-05-09 ENCOUNTER — TELEPHONE (OUTPATIENT)
Dept: PULMONOLOGY | Facility: CLINIC | Age: 79
End: 2024-05-09
Payer: MEDICARE

## 2024-05-09 LAB
A ALTERNATA IGE QN: <0.1 KU/L
A FUMIGATUS IGE QN: <0.1 KU/L
ALLERGEN BOXELDER MAPLE TREE IGE: <0.1 KU/L
ALLERGEN MAPLE (BOX ELDER) CLASS: NORMAL
ALLERGEN MULBERRY CLASS: NORMAL
ALLERGEN MULBERRY TREE IGE: <0.1 KU/L
ALLERGEN PIGWEED IGE: <0.1 KU/L
ALLERGEN WALNUT TREE IGE: NORMAL KU/L
APTT PPP: 37.5 SEC (ref 21–32)
BERMUDA GRASS IGE QN: <0.1 KU/L
C HERBARUM IGE QN: <0.1 KU/L
CAT DANDER IGE QN: <0.1 KU/L
COMMON PIGWEED CLASS: NORMAL
COMMON RAGWEED IGE QN: <0.1 KU/L
D FARINAE IGE QN: <0.1 KU/L
D PTERONYSS IGE QN: <0.1 KU/L
DEPRECATED A ALTERNATA IGE RAST QL: NORMAL
DEPRECATED A FUMIGATUS IGE RAST QL: NORMAL
DEPRECATED BERMUDA GRASS IGE RAST QL: NORMAL
DEPRECATED C HERBARUM IGE RAST QL: NORMAL
DEPRECATED CAT DANDER IGE RAST QL: NORMAL
DEPRECATED COMMON RAGWEED IGE RAST QL: NORMAL
DEPRECATED D FARINAE IGE RAST QL: NORMAL
DEPRECATED D PTERONYSS IGE RAST QL: NORMAL
DEPRECATED DOG DANDER IGE RAST QL: NORMAL
DEPRECATED ELDER IGE RAST QL: NORMAL
DEPRECATED MOUSE URINE PROT IGE RAST QL: NORMAL
DEPRECATED MT JUNIPER IGE RAST QL: NORMAL
DEPRECATED P NOTATUM IGE RAST QL: NORMAL
DEPRECATED PECAN/HICK TREE IGE RAST QL: NORMAL
DEPRECATED ROACH IGE RAST QL: NORMAL
DEPRECATED SILVER BIRCH IGE RAST QL: NORMAL
DEPRECATED TIMOTHY IGE RAST QL: NORMAL
DEPRECATED WHITE ELM IGE RAST QL: NORMAL
DEPRECATED WHITE OAK IGE RAST QL: NORMAL
DOG DANDER IGE QN: <0.1 KU/L
ELDER IGE QN: <0.1 KU/L
IGE: 10 IU/ML
INR PPP: 1.2 (ref 0.8–1.2)
MOUSE URINE PROT IGE QN: <0.1 KU/L
MT JUNIPER IGE QN: <0.1 KU/L
P NOTATUM IGE QN: <0.1 KU/L
PECAN/HICK TREE IGE QN: <0.1 KU/L
PROTHROMBIN TIME: 12.9 SEC (ref 9–12.5)
RAST ALLERGEN INTERPRETATION: NORMAL
ROACH IGE QN: <0.1 KU/L
SILVER BIRCH IGE QN: <0.1 KU/L
TIMOTHY IGE QN: <0.1 KU/L
WALNUT TREE CLASS: NORMAL
WHITE ELM IGE QN: <0.1 KU/L
WHITE OAK IGE QN: NORMAL KU/L

## 2024-05-09 NOTE — TELEPHONE ENCOUNTER
Called pt regarding Pulmonary Rehab referral. No answer, unable to leave a message; due to voicemail box being full.

## 2024-05-09 NOTE — TELEPHONE ENCOUNTER
----- Message from Beth Washington sent at 5/9/2024  3:50 PM CDT -----  Contact: Rm/Care taker  .Type:  Patient Returning Call    Who Called: Rm   Who Left Message for Patient: Steffanie  Does the patient know what this is regarding?: Scheduling   Would the patient rather a call back or a response via [facility]?  Call   Best Call Back Number: [phone]    Additional Information:

## 2024-05-13 ENCOUNTER — TELEPHONE (OUTPATIENT)
Dept: PULMONOLOGY | Facility: HOSPITAL | Age: 79
End: 2024-05-13
Payer: MEDICARE

## 2024-05-13 NOTE — TELEPHONE ENCOUNTER
Called pt regarding Pulmonary Rehab referral. SWP's brother/caregiver- Rm. Pt is awaiting a heart cath later this week and is also receiving HH services. Pt is not ready to enroll in AL at this time. Pt would like to wait until Cardiac issues have been addressed and HH services are complete. F/u in a couple of weeks.

## 2024-05-17 ENCOUNTER — HOSPITAL ENCOUNTER (OUTPATIENT)
Facility: HOSPITAL | Age: 79
Discharge: HOME | End: 2024-05-18
Attending: INTERNAL MEDICINE | Admitting: INTERNAL MEDICINE
Payer: MEDICARE

## 2024-05-17 DIAGNOSIS — R06.02 SOB (SHORTNESS OF BREATH): ICD-10-CM

## 2024-05-17 DIAGNOSIS — R53.83 MALAISE AND FATIGUE: ICD-10-CM

## 2024-05-17 DIAGNOSIS — I50.43 ACUTE ON CHRONIC COMBINED SYSTOLIC AND DIASTOLIC CONGESTIVE HEART FAILURE: ICD-10-CM

## 2024-05-17 DIAGNOSIS — R53.81 MALAISE AND FATIGUE: ICD-10-CM

## 2024-05-17 DIAGNOSIS — I25.110 ATHEROSCLEROSIS OF NATIVE CORONARY ARTERY OF NATIVE HEART WITH UNSTABLE ANGINA PECTORIS: ICD-10-CM

## 2024-05-17 DIAGNOSIS — Z86.73 HISTORY OF CVA (CEREBROVASCULAR ACCIDENT): ICD-10-CM

## 2024-05-17 DIAGNOSIS — Z98.890 S/P CORONARY ANGIOGRAM: Primary | ICD-10-CM

## 2024-05-17 LAB
ALLENS TEST: ABNORMAL
DELSYS: ABNORMAL
FIO2: 21
HCO3 UR-SCNC: 21.5 MMOL/L (ref 24–28)
MODE: ABNORMAL
PCO2 BLDA: 45.4 MMHG
PH SMN: 7.28 [PH] (ref 7.35–7.45)
PO2 BLDA: 35 MMHG (ref 80–100)
POC BE: -5 MMOL/L
POC SATURATED O2: 60 %
POCT GLUCOSE: 99 MG/DL (ref 70–110)
SAMPLE: ABNORMAL
SITE: ABNORMAL

## 2024-05-17 PROCEDURE — 99152 MOD SED SAME PHYS/QHP 5/>YRS: CPT | Performed by: INTERNAL MEDICINE

## 2024-05-17 PROCEDURE — 82803 BLOOD GASES ANY COMBINATION: CPT

## 2024-05-17 PROCEDURE — C1751 CATH, INF, PER/CENT/MIDLINE: HCPCS | Performed by: INTERNAL MEDICINE

## 2024-05-17 PROCEDURE — 63600175 PHARM REV CODE 636 W HCPCS: Performed by: INTERNAL MEDICINE

## 2024-05-17 PROCEDURE — C1769 GUIDE WIRE: HCPCS | Performed by: INTERNAL MEDICINE

## 2024-05-17 PROCEDURE — 93460 R&L HRT ART/VENTRICLE ANGIO: CPT | Mod: 26,,, | Performed by: INTERNAL MEDICINE

## 2024-05-17 PROCEDURE — 99152 MOD SED SAME PHYS/QHP 5/>YRS: CPT | Mod: ,,, | Performed by: INTERNAL MEDICINE

## 2024-05-17 PROCEDURE — 99153 MOD SED SAME PHYS/QHP EA: CPT | Performed by: INTERNAL MEDICINE

## 2024-05-17 PROCEDURE — 99900035 HC TECH TIME PER 15 MIN (STAT)

## 2024-05-17 PROCEDURE — 93460 R&L HRT ART/VENTRICLE ANGIO: CPT | Performed by: INTERNAL MEDICINE

## 2024-05-17 PROCEDURE — 25500020 PHARM REV CODE 255: Performed by: INTERNAL MEDICINE

## 2024-05-17 PROCEDURE — 25000003 PHARM REV CODE 250: Performed by: INTERNAL MEDICINE

## 2024-05-17 PROCEDURE — C1894 INTRO/SHEATH, NON-LASER: HCPCS | Performed by: INTERNAL MEDICINE

## 2024-05-17 RX ORDER — ACETAMINOPHEN 325 MG/1
650 TABLET ORAL EVERY 4 HOURS PRN
Status: DISCONTINUED | OUTPATIENT
Start: 2024-05-17 | End: 2024-05-18 | Stop reason: HOSPADM

## 2024-05-17 RX ORDER — NAPROXEN SODIUM 220 MG/1
81 TABLET, FILM COATED ORAL ONCE
Status: COMPLETED | OUTPATIENT
Start: 2024-05-17 | End: 2024-05-17

## 2024-05-17 RX ORDER — VERAPAMIL HYDROCHLORIDE 2.5 MG/ML
INJECTION, SOLUTION INTRAVENOUS
Status: DISCONTINUED | OUTPATIENT
Start: 2024-05-17 | End: 2024-05-17 | Stop reason: HOSPADM

## 2024-05-17 RX ORDER — MIDAZOLAM HYDROCHLORIDE 1 MG/ML
INJECTION INTRAMUSCULAR; INTRAVENOUS
Status: DISCONTINUED | OUTPATIENT
Start: 2024-05-17 | End: 2024-05-17 | Stop reason: HOSPADM

## 2024-05-17 RX ORDER — DIPHENHYDRAMINE HCL 50 MG
50 CAPSULE ORAL ONCE
Status: COMPLETED | OUTPATIENT
Start: 2024-05-17 | End: 2024-05-17

## 2024-05-17 RX ORDER — HEPARIN SODIUM 1000 [USP'U]/ML
INJECTION INTRAVENOUS; SUBCUTANEOUS
Status: DISCONTINUED | OUTPATIENT
Start: 2024-05-17 | End: 2024-05-17 | Stop reason: HOSPADM

## 2024-05-17 RX ORDER — SODIUM CHLORIDE 0.9 % (FLUSH) 0.9 %
10 SYRINGE (ML) INJECTION
Status: DISCONTINUED | OUTPATIENT
Start: 2024-05-17 | End: 2024-05-18 | Stop reason: HOSPADM

## 2024-05-17 RX ORDER — SODIUM CHLORIDE 9 MG/ML
INJECTION, SOLUTION INTRAVENOUS CONTINUOUS
Status: ACTIVE | OUTPATIENT
Start: 2024-05-17 | End: 2024-05-17

## 2024-05-17 RX ORDER — LIDOCAINE HYDROCHLORIDE 20 MG/ML
INJECTION, SOLUTION EPIDURAL; INFILTRATION; INTRACAUDAL; PERINEURAL
Status: DISCONTINUED | OUTPATIENT
Start: 2024-05-17 | End: 2024-05-17 | Stop reason: HOSPADM

## 2024-05-17 RX ORDER — FENTANYL CITRATE 50 UG/ML
INJECTION, SOLUTION INTRAMUSCULAR; INTRAVENOUS
Status: DISCONTINUED | OUTPATIENT
Start: 2024-05-17 | End: 2024-05-17 | Stop reason: HOSPADM

## 2024-05-17 RX ORDER — NITROGLYCERIN 5 MG/ML
INJECTION, SOLUTION INTRAVENOUS
Status: DISCONTINUED | OUTPATIENT
Start: 2024-05-17 | End: 2024-05-17 | Stop reason: HOSPADM

## 2024-05-17 RX ORDER — ONDANSETRON 8 MG/1
8 TABLET, ORALLY DISINTEGRATING ORAL EVERY 8 HOURS PRN
Status: DISCONTINUED | OUTPATIENT
Start: 2024-05-17 | End: 2024-05-18 | Stop reason: HOSPADM

## 2024-05-17 RX ADMIN — ASPIRIN 81 MG CHEWABLE TABLET 81 MG: 81 TABLET CHEWABLE at 08:05

## 2024-05-17 RX ADMIN — SODIUM CHLORIDE: 9 INJECTION, SOLUTION INTRAVENOUS at 10:05

## 2024-05-17 RX ADMIN — SODIUM CHLORIDE: 9 INJECTION, SOLUTION INTRAVENOUS at 08:05

## 2024-05-17 RX ADMIN — DIPHENHYDRAMINE HYDROCHLORIDE 50 MG: 50 CAPSULE ORAL at 08:05

## 2024-05-17 NOTE — NURSING
Rec'd to [facility]  NADN  Follows simple requests. Drowsy  L groin dsg c,d,I.  L brachial dsg c,d,I.    Left TR Band  Site benign  Warm blankets provided.  NS at 150/hr

## 2024-05-17 NOTE — NURSING
All air removed from TR Band.  Sm amt bruising noted to wrist.  Gauze dsg w/paper tape applied.  Inst to remove 12noon tomorrow and may take shower at that time.  Verbalized understanding.

## 2024-05-17 NOTE — NURSING TRANSFER
Nursing Transfer Note      5/17/2024   2:26 PM    Report to Shayne CLARK    S/P L and R Heart Cath    No Fix  Dsg to L radial site of wrest benign  Dsg c.d.I.  Dsg L Brachial site of attempted venous stick  Dsg to L Femoral groin site c,d,I   ([facility])    SL to R Hand  site benign    A & O X 3  Cooperative/Communicative.  Neuro intact  Denies pain

## 2024-05-17 NOTE — PLAN OF CARE
Problem: Adult Inpatient Plan of Care  Goal: Plan of Care Review  Reactivated  Goal: Patient-Specific Goal (Individualized)  Reactivated  Goal: Absence of Hospital-Acquired Illness or Injury  Reactivated  Goal: Optimal Comfort and Wellbeing  Reactivated  Goal: Readiness for Transition of Care  Reactivated

## 2024-05-18 RX ORDER — CARVEDILOL 12.5 MG/1
12.5 TABLET ORAL 2 TIMES DAILY WITH MEALS
Status: CANCELLED | OUTPATIENT
Start: 2024-05-18

## 2024-05-18 NOTE — PLAN OF CARE
[facility] - Telemetry (Hospital)  Discharge Final Note    Primary Care Provider: Laron Chaudhari MD    Expected Discharge Date: 5/18/2024    Final Discharge Note (most recent)       Final Note - 05/18/24 1225          Final Note    Assessment Type Final Discharge Note     Anticipated Discharge Disposition Home or Self Care        Post-Acute Status    Discharge Delays None known at this time                   No needs at time of chart review. Km, MSW    Important Message from Medicare

## 2024-05-18 NOTE — PLAN OF CARE
Problem: Adult Inpatient Plan of Care  Goal: Absence of Hospital-Acquired Illness or Injury  Outcome: Progressing  Goal: Optimal Comfort and Wellbeing  Outcome: Progressing

## 2024-05-18 NOTE — DISCHARGE SUMMARY
'[facility] Telemetry ([facility])  Cardiology  Discharge Summary      Patient Name: Shireen Felix  MRN: [id number]  Admission Date: 5/17/2024  Hospital Length of Stay: 0 days  Discharge Date and Time:  05/18/2024 8:32 AM  Attending Physician: Rachelle Sarabia MD    Discharging Provider: Rachelle Sarabia MD  Primary Care Physician: Laron Chaudhari MD    HPI:   No notes on file    Procedure(s) (LRB):  CATHETERIZATION, HEART, BOTH LEFT AND RIGHT (N/A)     Indwelling Lines/Drains at time of discharge:  Lines/Drains/Airways       None                   Hospital Course:  No notes on file    Goals of Care Treatment Preferences:  Code Status: Full Code      Consults:     Significant Diagnostic Studies: Angiography: Results for orders placed during the hospital encounter of 05/17/24    Cardiac catheterization    Conclusion    The pre-procedure left ventricular end diastolic pressure was 18.    The estimated blood loss was none.    There was non-obstructive coronary artery disease..    The filling pressures on the left were mildly elevated.    Normal pulmonary artery pressures    The procedure log was documented by Documenter: Chrissy De La Torre RN and verified by Rachelle Sarabia MD.    Date: 5/17/2024  Time: 10:28 AM    Extended recovery by patient request, no ride or family available to go back home  Pending Diagnostic Studies:       None            There are no hospital problems to display for this patient.    Assessment & plan notes cannot be loaded without a specified hospital service.      Discharged Condition: good    Disposition: Home or Self Care    Follow Up:    Patient Instructions:   No discharge procedures on file.  Medications:  Reconciled Home Medications:      Medication List        CONTINUE taking these medications      * albuterol 2.5 mg /3 mL (0.083 %) nebulizer solution  Commonly known as: PROVENTIL  EMPTY 1 VIAL INTO NEBULIZER EVERY 4 HOURS AS NEEDED FRO RESCUE     * albuterol 90  mcg/actuation inhaler  Commonly known as: PROVENTIL/VENTOLIN HFA  INHALE TWO PUFFS INTO THE LUNGS EVERY 4 HOURS AS NEEDED     albuterol-budesonide 90-80 mcg/actuation  Commonly known as: Airsupra  Inhale 2 puffs into the lungs every 4 (four) hours as needed (wheezing, cough).     APTIOM 400 mg Tab tablet  Generic drug: eslicarbazepine  Take 400 mg by mouth.     atorvastatin 40 MG tablet  Commonly known as: LIPITOR  TAKE 1 TABLET BY MOUTH ONCE DAILY     carvediloL 12.5 MG tablet  Commonly known as: COREG  Take 1 tablet (12.5 mg total) by mouth 2 (two) times daily with meals.     cholecalciferol (vitamin D3) 1,250 mcg (50,000 unit) capsule  Take 1 capsule (50,000 Units total) by mouth every 7 days.     cholestyramine 4 gram packet  Commonly known as: QUESTRAN  TAKE 1 PACKET BY MOUTH TWICE DAILY AS DIRECTED     clopidogreL 75 mg tablet  Commonly known as: PLAVIX  Take 1 tablet (75 mg total) by mouth once daily.     denosumab 60 mg/mL Syrg  Commonly known as: PROLIA  every 6 (six) months.     dicyclomine 10 MG capsule  Commonly known as: BENTYL  Take 1 capsule (10 mg total) by mouth 2 (two) times daily.     diltiaZEM 120 MG Cp24  Commonly known as: CARDIZEM CD  Take 1 capsule (120 mg total) by mouth once daily.     dorzolamide-timolol 2-0.5% 22.3-6.8 mg/mL ophthalmic solution  Commonly known as: COSOPT  Place 1 drop into both eyes every 12 (twelve) hours.     empagliflozin 10 mg tablet  Commonly known as: JARDIANCE  Take 1 tablet (10 mg total) by mouth once daily.     ENTRESTO 24-26 mg per tablet  Generic drug: sacubitriL-valsartan  Take 1 tablet by mouth 2 (two) times daily.     EScitalopram oxalate 20 MG tablet  Commonly known as: LEXAPRO  TAKE 1 TABLET BY MOUTH ONCE DAILY     fluocinonide 0.05 % external solution  Commonly known as: LIDEX  Apply topically 2 (two) times daily. Use on itchy spots on scalp and ear     fluticasone propionate 50 mcg/actuation nasal spray  Commonly known as: FLONASE  USE 2 SPRAYS INTO EACH  NOSTRIL ONCE A DAY AT 6AM AS DIRECTED     furosemide 20 MG tablet  Commonly known as: LASIX  Take 1 tablet (20 mg total) by mouth daily as needed (for edema, swelling, fluid build up, or 3 pound weight gain in 24 hours).     gabapentin 300 MG capsule  Commonly known as: NEURONTIN  Take 1 capsule (300 mg total) by mouth 3 (three) times daily.     hydrOXYzine HCL 25 MG tablet  Commonly known as: ATARAX  TAKE 1 TABLET BY MOUTH EVERY EVENING     ipratropium 42 mcg (0.06 %) nasal spray  Commonly known as: ATROVENT  USE 2 SPRAYS INTO EACH NOSTRIL FOUR TIMES DAILY     isosorbide mononitrate 120 MG 24 hr tablet  Commonly known as: IMDUR  Take 1 tablet (120 mg total) by mouth every evening.     ketoconazole 2 % cream  Commonly known as: NIZORAL  Apply topically 2 (two) times daily. For dry flaky patches of ear.     latanoprost 0.005 % ophthalmic solution  Place 1 drop into both eyes every evening.     levETIRAcetam 750 MG Tab  Commonly known as: KEPPRA  Take 1 tablet (750 mg total) by mouth 2 (two) times daily.     linezolid 600 mg Tab  Commonly known as: ZYVOX  Take 1 tablet (600 mg total) by mouth every 12 (twelve) hours.     magnesium oxide 400 mg (241.3 mg magnesium) tablet  Commonly known as: MAG-OX  Take 1 tablet (400 mg total) by mouth once daily.     * montelukast 10 mg tablet  Commonly known as: SINGULAIR  Take 1 tablet (10 mg total) by mouth once daily.     * montelukast 10 mg tablet  Commonly known as: SINGULAIR  Take 1 tablet (10 mg total) by mouth once daily.     mucus clearing device  Commonly known as: AEROBIKA OSCILLATING PEP SYSTM  by Misc.(Non-Drug; Combo Route) route 4 (four) times daily.     MYRBETRIQ 50 mg Tb24  Generic drug: mirabegron  TAKE 1 TABLET BY MOUTH ONCE DAILY     nitroGLYCERIN 0.4 MG SL tablet  Commonly known as: NITROSTAT  Place 1 tablet (0.4 mg total) under the tongue every 5 (five) minutes as needed for Chest pain.     omeprazole 40 MG capsule  Commonly known as: PRILOSEC  Take 40 mg by  mouth every morning.     ondansetron 4 MG Tbdl  Commonly known as: ZOFRAN-ODT  Take 1 tablet (4 mg total) by mouth every 6 (six) hours as needed.     pantoprazole 40 MG tablet  Commonly known as: PROTONIX  Take 40 mg by mouth 2 (two) times daily.     potassium chloride SA 20 MEQ tablet  Commonly known as: K-DUR,KLOR-CON  Take 1 tablet (20 mEq total) by mouth once daily. Take extra dose with Lasix - fluid pill     ranolazine 1,000 mg Tb12  Commonly known as: RANEXA  Take 1 tablet (1,000 mg total) by mouth 2 (two) times daily.     roflumilast 500 mcg Tab  Commonly known as: DALIRESP  TAKE ONE TABLET BY MOUTH DAILY     sodium chloride 3% 3 % nebulizer solution  Take 4 mLs by nebulization as needed for Cough (chest congestion).     spironolactone 50 MG tablet  Commonly known as: ALDACTONE  Take 1 tablet (50 mg total) by mouth once daily.     tezepelumab-ekko 210 mg/1.91 mL (110 mg/mL) Pnij  Inject 210 mg into the skin every 28 days.     tiZANidine 4 MG tablet  Commonly known as: ZANAFLEX  Take 4 mg by mouth every 8 (eight) hours.     TRELEGY ELLIPTA 200-62.5-25 mcg inhaler  Generic drug: fluticasone-umeclidin-vilanter  INHALE 1 PUFF INTO THE LUNGS DAILY     triamcinolone acetonide 0.025% 0.025 % cream  Commonly known as: KENALOG  Apply topically 2 (two) times daily. PRN rash and itching of ear. Mild steroid cream.           * This list has 4 medication(s) that are the same as other medications prescribed for you. Read the directions carefully, and ask your doctor or other care provider to review them with you.                ASK your doctor about these medications      fluconazole 150 MG Tab  Commonly known as: DIFLUCAN  Take 1 tablet (150 mg total) by mouth once daily.     HYDROcodone-acetaminophen 5-325 mg per tablet  Commonly known as: NORCO  Take 1 tablet by mouth every 6 (six) hours as needed for Pain.     ibuprofen 800 MG tablet  Commonly known as: ADVIL,MOTRIN  Take 1 tablet (800 mg total) by mouth 3 (three)  times daily.     predniSONE 10 MG tablet  Commonly known as: DELTASONE  Take 1 tablet (10 mg total) by mouth once daily.              Time spent on the discharge of patient: 30 minutes    Rachelle Sarabia MD  Cardiology  [facility] Telemetry ([facility])

## 2024-05-20 ENCOUNTER — TELEPHONE (OUTPATIENT)
Dept: PULMONOLOGY | Facility: CLINIC | Age: 79
End: 2024-05-20
Payer: MEDICARE

## 2024-05-20 ENCOUNTER — TELEPHONE (OUTPATIENT)
Dept: INTERNAL MEDICINE | Facility: CLINIC | Age: 79
End: 2024-05-20
Payer: MEDICARE

## 2024-05-20 DIAGNOSIS — Z00.00 ROUTINE ADULT HEALTH MAINTENANCE: Primary | ICD-10-CM

## 2024-05-20 NOTE — TELEPHONE ENCOUNTER
----- Message from Magdalena Yu sent at 5/20/2024 11:08 AM CDT -----  Type:  Needs Medical Advice    Who Called: pt    Would the patient rather a call back or a response via [facility]? Call back  Best Call Back Number: [phone]  Additional Information:     Pt stated she would like a call back to inquire if the test that the doctor is doing on her not the same as the test she just had with Dr Fishman

## 2024-05-20 NOTE — TELEPHONE ENCOUNTER
Spoke with patient she is wanting to know if the complete pft and jennifer. If they are the same test? Told patient they were different.

## 2024-05-20 NOTE — TELEPHONE ENCOUNTER
----- Message from Vonda Harris sent at 5/20/2024 11:31 AM CDT -----  Regarding: [facility]ep [facility]/Paula    States she is itching her face, scalp and inside of he nose. States she is not sure, if it one of the medications that she recently started. States she has taken benadryl and its not working. She would like to get something called in for the itching. Please call Paula [phone] or the patient. Thank you      Please advise this message. Thanks. LUL

## 2024-05-21 NOTE — TELEPHONE ENCOUNTER
She has a prescription for hydroxyzine for itching sent in by dermatology. If she tried this and did not work, she needs to see dermatology(hussein again).

## 2024-05-22 ENCOUNTER — CLINICAL SUPPORT (OUTPATIENT)
Dept: PULMONOLOGY | Facility: CLINIC | Age: 79
End: 2024-05-22
Payer: MEDICARE

## 2024-05-22 ENCOUNTER — OFFICE VISIT (OUTPATIENT)
Dept: PULMONOLOGY | Facility: CLINIC | Age: 79
End: 2024-05-22
Payer: MEDICARE

## 2024-05-22 VITALS
HEART RATE: 98 BPM | RESPIRATION RATE: 18 BRPM | RESPIRATION RATE: 16 BRPM | OXYGEN SATURATION: 99 % | HEIGHT: 64 IN | WEIGHT: 104.5 LBS | SYSTOLIC BLOOD PRESSURE: 135 MMHG | BODY MASS INDEX: 17.84 KG/M2 | WEIGHT: 104.94 LBS | DIASTOLIC BLOOD PRESSURE: 70 MMHG | DIASTOLIC BLOOD PRESSURE: 63 MMHG | OXYGEN SATURATION: 97 % | HEART RATE: 73 BPM | SYSTOLIC BLOOD PRESSURE: 102 MMHG | BODY MASS INDEX: 17.92 KG/M2 | TEMPERATURE: 98 F | HEIGHT: 64 IN

## 2024-05-22 DIAGNOSIS — J41.1 MUCOPURULENT CHRONIC BRONCHITIS: Chronic | ICD-10-CM

## 2024-05-22 DIAGNOSIS — J47.9 BRONCHIECTASIS WITHOUT COMPLICATION: ICD-10-CM

## 2024-05-22 DIAGNOSIS — J84.10 PULMONARY INTERSTITIAL FIBROSIS: ICD-10-CM

## 2024-05-22 DIAGNOSIS — J84.112 UIP (USUAL INTERSTITIAL PNEUMONITIS): ICD-10-CM

## 2024-05-22 DIAGNOSIS — J45.50 SEVERE PERSISTENT ASTHMA WITHOUT COMPLICATION: ICD-10-CM

## 2024-05-22 DIAGNOSIS — J84.9 INTERSTITIAL PULMONARY DISEASE, UNSPECIFIED: Primary | ICD-10-CM

## 2024-05-22 LAB
BRPFT: ABNORMAL
ERV LLN: -16449.48
ERV PRE REF: 74.2 %
ERV REF: 0.52
FEF 25 75 LLN: 0.66
FEF 25 75 PRE REF: 215 %
FEF 25 75 REF: 1.58
FEV1 FVC LLN: 62
FEV1 FVC PRE REF: 117 %
FEV1 FVC REF: 77
FEV1 LLN: 1.37
FEV1 PRE REF: 111.1 %
FEV1 REF: 1.96
FRCPLETH LLN: 1.91
FRCPLETH PREREF: 95.1 %
FRCPLETH REF: 2.73
FVC LLN: 1.81
FVC PRE REF: 93.8 %
FVC REF: 2.58
MVV LLN: 63
MVV PRE REF: 52.3 %
MVV REF: 75
PEF LLN: 3.19
PEF PRE REF: 111.6 %
PEF REF: 4.91
PRE ERV: 0.38 L (ref -16449.48–16450.52)
PRE FEF 25 75: 3.39 L/S (ref 0.66–2.49)
PRE FET 100: 7.52 SEC
PRE FEV1 FVC: 90.11 % (ref 62.48–91.6)
PRE FEV1: 2.18 L (ref 1.37–2.54)
PRE FRC PL: 2.6 L
PRE FVC: 2.42 L (ref 1.81–3.34)
PRE MVV: 39 L/MIN (ref 63.36–85.72)
PRE PEF: 5.49 L/S (ref 3.19–6.63)
PRE RV: 1.62 L (ref 1.64–2.79)
PRE TLC: 4.1 L (ref 3.98–5.96)
RAW LLN: 3.06
RAW PRE REF: 101.9 %
RAW PRE: 3.12 CMH2O*S/L (ref 3.06–3.06)
RAW REF: 3.06
RV LLN: 1.64
RV PRE REF: 73 %
RV REF: 2.21
RVTLC LLN: 36
RVTLC PRE REF: 86.2 %
RVTLC PRE: 39.48 % (ref 36.23–55.41)
RVTLC REF: 46
TLC LLN: 3.98
TLC PRE REF: 82.4 %
TLC REF: 4.97
VC LLN: 1.81
VC PRE REF: 96.3 %
VC PRE: 2.48 L (ref 1.81–3.34)
VC REF: 2.58
VTGRAWPRE: 2.79 L

## 2024-05-22 PROCEDURE — 1159F MED LIST DOCD IN RCRD: CPT | Mod: CPTII,S$GLB,, | Performed by: INTERNAL MEDICINE

## 2024-05-22 PROCEDURE — 3078F DIAST BP <80 MM HG: CPT | Mod: CPTII,S$GLB,, | Performed by: INTERNAL MEDICINE

## 2024-05-22 PROCEDURE — 3072F LOW RISK FOR RETINOPATHY: CPT | Mod: CPTII,S$GLB,, | Performed by: INTERNAL MEDICINE

## 2024-05-22 PROCEDURE — 3074F SYST BP LT 130 MM HG: CPT | Mod: CPTII,S$GLB,, | Performed by: INTERNAL MEDICINE

## 2024-05-22 PROCEDURE — 3288F FALL RISK ASSESSMENT DOCD: CPT | Mod: CPTII,S$GLB,, | Performed by: INTERNAL MEDICINE

## 2024-05-22 PROCEDURE — 1160F RVW MEDS BY RX/DR IN RCRD: CPT | Mod: CPTII,S$GLB,, | Performed by: INTERNAL MEDICINE

## 2024-05-22 PROCEDURE — 94010 BREATHING CAPACITY TEST: CPT | Mod: S$GLB,,, | Performed by: INTERNAL MEDICINE

## 2024-05-22 PROCEDURE — 99999 PR PBB SHADOW E&M-EST. PATIENT-LVL III: CPT | Mod: PBBFAC,,, | Performed by: INTERNAL MEDICINE

## 2024-05-22 PROCEDURE — 99214 OFFICE O/P EST MOD 30 MIN: CPT | Mod: 25,S$GLB,, | Performed by: INTERNAL MEDICINE

## 2024-05-22 PROCEDURE — 94726 PLETHYSMOGRAPHY LUNG VOLUMES: CPT | Mod: S$GLB,,, | Performed by: INTERNAL MEDICINE

## 2024-05-22 PROCEDURE — 1101F PT FALLS ASSESS-DOCD LE1/YR: CPT | Mod: CPTII,S$GLB,, | Performed by: INTERNAL MEDICINE

## 2024-05-22 NOTE — PROGRESS NOTES
"Subjective:      Patient ID: Shireen Felix is a 78 y.o. female.    Chief Complaint: Pulmonary Fibrosis    Pulmonary Fibrosis        78-year-old female with chronic systolic heart failure with ejection fraction of 30-40% as well as some pulmonary fibrosis in a UIP pattern which is mild.  Patient has been followed in this office for a number of years by multiple providers.  She is also followed closely by Cardiology and had a recent left heart catheterization which showed elevated left-sided filling pressures but nonocclusive coronary disease.  She had a right heart catheterization done at that time which was normal.  Here for re-evaluation of her fibrotic lung disease and consideration of antifibrotic therapy.  I have also reviewed her prior 6 minute walk test and all of them have showed significant heart rate limitation with exercise and chronotropic incompetence.  Pulmonary function tests done today for review as well.  She is here today with her brother.  She is quite frail and weak and gets very breathless with minimal activity.    Review of Systems as per history of present illness otherwise negative  Objective:     Physical Exam   Constitutional: She is oriented to person, place, and time. She appears well-developed. She appears cachectic. No distress.   Frail   HENT:   Head: Normocephalic.   Cardiovascular: Normal rate.   Murmur heard.  Pulmonary/Chest: Normal expansion, symmetric chest wall expansion and effort normal. She has no wheezes. She has rales.   Abdominal: Soft.   Neurological: She is alert and oriented to person, place, and time.   Psychiatric: She has a normal mood and affect.   Nursing note and vitals reviewed.          5/22/2024     3:08 PM 5/18/2024     7:41 AM 5/18/2024     4:51 AM 5/17/2024     8:00 PM 5/17/2024     3:31 PM 5/17/2024     2:37 PM 5/17/2024    10:42 AM   Pulmonary Function Tests   SpO2 97 % 99 % 97 % 99 % 100 % 99 % 98 %   Height 5' 4" (1.626 m)         Weight 47.6 kg " (104 lb 15 oz)         BMI (Calculated) 18              Assessment:     1. Interstitial pulmonary disease, unspecified    2. UIP (usual interstitial pneumonitis)         Orders Placed This Encounter   Procedures    CT Chest Without Contrast     Standing Status:   Future     Standing Expiration Date:   5/22/2025     Order Specific Question:   May the Radiologist modify the order per protocol to meet the clinical needs of the patient?     Answer:   Yes     I personally reviewed CT of the chest from May of 2024 and compared this with multiple prior studies going back to 2019.  My interpretation is mild fibrosis in a UIP pattern that has progressed minimally since 2019    I personally reviewed pulmonary function test data obtained today.  This shows normal spirometry and normal lung volumes measurement.  Going back a number of years her spirometry has always been normal.  She has no obstruction      Plan:     Clear evidence of chronotropic incompetence demonstrated on multiple prior walk studies with normoxemia during those studies.  Pulmonary function tests are normal and degree of fibrosis is minimal.  Minimal progression over 5 years' time.  Given these facts I do not believe she requires antifibrotic therapy at this time.  She does not have obstructive lung disease and no clinical evidence for asthma and likely could dispense with her inhaled medications.  We will address that at next visit.  I discussed with her that it can be difficult to balance therapeutic doses of beta-blocker and calcium channel blockers with allowing for enough heart rate increased with exercise to not make her breathless.  She has an appointment with her cardiologist tomorrow and we will discuss this.  Ultimately she has end-stage cardiac disease and I do not know that she has a lot of good therapeutic options remaining to her.  Had a long discussion with the patient and her brother regarding all of these issues.  They voiced understanding  and agreement and I answered their questions to their apparent satisfaction.  I will see her back in 6 months with a noncontrast CT to track progression of her fibrotic lung disease.

## 2024-05-23 ENCOUNTER — TELEPHONE (OUTPATIENT)
Dept: INTERNAL MEDICINE | Facility: CLINIC | Age: 79
End: 2024-05-23
Payer: MEDICARE

## 2024-05-23 ENCOUNTER — OFFICE VISIT (OUTPATIENT)
Dept: CARDIOLOGY | Facility: CLINIC | Age: 79
End: 2024-05-23
Payer: MEDICARE

## 2024-05-23 ENCOUNTER — HOSPITAL ENCOUNTER (OUTPATIENT)
Dept: CARDIOLOGY | Facility: HOSPITAL | Age: 79
Discharge: HOME | End: 2024-05-23
Attending: INTERNAL MEDICINE
Payer: MEDICARE

## 2024-05-23 VITALS
HEART RATE: 82 BPM | BODY MASS INDEX: 18.11 KG/M2 | WEIGHT: 106.06 LBS | DIASTOLIC BLOOD PRESSURE: 67 MMHG | HEIGHT: 64 IN | SYSTOLIC BLOOD PRESSURE: 112 MMHG | OXYGEN SATURATION: 100 %

## 2024-05-23 DIAGNOSIS — I25.10 ATHEROSCLEROSIS OF NATIVE CORONARY ARTERY OF NATIVE HEART WITHOUT ANGINA PECTORIS: ICD-10-CM

## 2024-05-23 DIAGNOSIS — I50.32 CHRONIC DIASTOLIC HEART FAILURE: ICD-10-CM

## 2024-05-23 DIAGNOSIS — I25.110 ATHEROSCLEROSIS OF NATIVE CORONARY ARTERY OF NATIVE HEART WITH UNSTABLE ANGINA PECTORIS: ICD-10-CM

## 2024-05-23 DIAGNOSIS — R42 EPISODIC LIGHTHEADEDNESS: ICD-10-CM

## 2024-05-23 DIAGNOSIS — Z00.00 ROUTINE ADULT HEALTH MAINTENANCE: ICD-10-CM

## 2024-05-23 DIAGNOSIS — Z86.73 HISTORY OF CVA (CEREBROVASCULAR ACCIDENT): ICD-10-CM

## 2024-05-23 DIAGNOSIS — I10 PRIMARY HYPERTENSION: Primary | ICD-10-CM

## 2024-05-23 DIAGNOSIS — I95.9 HYPOTENSION, UNSPECIFIED HYPOTENSION TYPE: ICD-10-CM

## 2024-05-23 DIAGNOSIS — Z86.73 HX OF COMPLETED STROKE: Primary | ICD-10-CM

## 2024-05-23 DIAGNOSIS — R00.2 PALPITATIONS: ICD-10-CM

## 2024-05-23 DIAGNOSIS — M48.062 SPINAL STENOSIS OF LUMBAR REGION WITH NEUROGENIC CLAUDICATION: ICD-10-CM

## 2024-05-23 DIAGNOSIS — R93.1 LEFT VENTRICULAR EJECTION FRACTION LESS THAN 40%: ICD-10-CM

## 2024-05-23 DIAGNOSIS — J84.112 UIP (USUAL INTERSTITIAL PNEUMONITIS): ICD-10-CM

## 2024-05-23 DIAGNOSIS — Z98.890 HISTORY OF CORONARY ANGIOGRAM: ICD-10-CM

## 2024-05-23 LAB
OHS QRS DURATION: 148 MS
OHS QTC CALCULATION: 493 MS

## 2024-05-23 PROCEDURE — 93010 ELECTROCARDIOGRAM REPORT: CPT | Mod: ,,, | Performed by: INTERNAL MEDICINE

## 2024-05-23 PROCEDURE — 99214 OFFICE O/P EST MOD 30 MIN: CPT | Mod: 25,S$GLB,, | Performed by: INTERNAL MEDICINE

## 2024-05-23 PROCEDURE — 3288F FALL RISK ASSESSMENT DOCD: CPT | Mod: CPTII,S$GLB,, | Performed by: INTERNAL MEDICINE

## 2024-05-23 PROCEDURE — 1159F MED LIST DOCD IN RCRD: CPT | Mod: CPTII,S$GLB,, | Performed by: INTERNAL MEDICINE

## 2024-05-23 PROCEDURE — 3074F SYST BP LT 130 MM HG: CPT | Mod: CPTII,S$GLB,, | Performed by: INTERNAL MEDICINE

## 2024-05-23 PROCEDURE — 3072F LOW RISK FOR RETINOPATHY: CPT | Mod: CPTII,S$GLB,, | Performed by: INTERNAL MEDICINE

## 2024-05-23 PROCEDURE — 99999 PR PBB SHADOW E&M-EST. PATIENT-LVL V: CPT | Mod: PBBFAC,,, | Performed by: INTERNAL MEDICINE

## 2024-05-23 PROCEDURE — 1126F AMNT PAIN NOTED NONE PRSNT: CPT | Mod: CPTII,S$GLB,, | Performed by: INTERNAL MEDICINE

## 2024-05-23 PROCEDURE — 1101F PT FALLS ASSESS-DOCD LE1/YR: CPT | Mod: CPTII,S$GLB,, | Performed by: INTERNAL MEDICINE

## 2024-05-23 PROCEDURE — 93005 ELECTROCARDIOGRAM TRACING: CPT

## 2024-05-23 PROCEDURE — 3078F DIAST BP <80 MM HG: CPT | Mod: CPTII,S$GLB,, | Performed by: INTERNAL MEDICINE

## 2024-05-23 RX ORDER — BISOPROLOL FUMARATE 5 MG/1
5 TABLET, FILM COATED ORAL DAILY
Qty: 30 TABLET | Refills: 11 | Status: SHIPPED | OUTPATIENT
Start: 2024-05-23 | End: 2025-05-23

## 2024-05-23 NOTE — TELEPHONE ENCOUNTER
Patient stopped by clinic today needing an order place for [facility]. Pt stated she was sent down by Cardiology.

## 2024-05-23 NOTE — PROGRESS NOTES
Subjective:   Patient ID:  Shireen Felix is a 78 y.o. female who presents for evaluation of No chief complaint on file.      HPI  May 23, 2024.    Comes in for a follow-up after recent coronary angiogram.  She had nonobstructive disease.    Her filling pressures on the left were mildly elevated no evidence of severe pulmonary hypertension.    She followed  the Pulmonary for her mild pulmonary fibrosis.  No change in treatment.    She was satting above 97% on room air in the clinic today with a heart rate of 70.  EKG unchanged.  While asked to deep breathe on exam she all of the sudden started to complain of chest pain and near-syncope and tachycardia.    Her vital signs were unchanged.  With the heart rate of 70s and a pulse ox of 97%.        5.9.2024  78-year-old female, with history of CAD possible ischemic cardiomyopathy.  She had a stent she states more than 10 years ago possibly to the LAD.  Her LV EF has been in up and down with the past few years between 35 40% and 50%.    She also has pulmonary fibrosis moderate at least by CT scan.  She follows with Pulmonary.  However she states that her oxygen is 100% it does not drop with exertion.  She has to stop as soon as he walks due to chest heaviness radiating up to her neck.  This is happening with a very low level of activity CCS 3-4.  She has a an abundant amount of coronary artery calcification on her CT scan.    She is maxed out on antianginals almost.    Has dyspnea on exertion.  Was recently had [place] for CHF.  Her BNP was elevated.    However today she looks good at rest.  Her lungs has minimal crackles possibly secondary to her long history of pulmonary fibrosis.    Past Medical History:   Diagnosis Date    Abnormal ECG 8/5/2019    Aneurysm     Anticoagulant long-term use     Arthritis     CAD in native artery 8/5/2019    COPD (chronic obstructive pulmonary disease)     Diabetes mellitus     Fall 10/10/2019    Formatting of this note  might be different from the original. Found sitting on floor next to bed last night Mild confusion today Does not recall falling or how she ended up on floor UA today Labs yesterday unremarkable    Glaucoma     Hypertension     Seizures     Shingles 05/27/2017    Stroke        Past Surgical History:   Procedure Laterality Date    BRAIN SURGERY      CARDIAC CATHETERIZATION      CATHETERIZATION OF BOTH LEFT AND RIGHT HEART N/A 5/17/2024    Procedure: CATHETERIZATION, HEART, BOTH LEFT AND RIGHT;  Surgeon: Rachelle Sarabia MD;  Location: [facility] CATH LAB;  Service: Cardiology;  Laterality: N/A;    COLONOSCOPY N/A 09/21/2023    Procedure: COLONOSCOPY;  Surgeon: Joanna Leal MD;  Location: [facility] ENDO;  Service: Endoscopy;  Laterality: N/A;    CORONARY ANGIOPLASTY      EPIDURAL STEROID INJECTION INTO CERVICAL SPINE N/A 2/7/2024    Procedure: Cervical IL XANDER, Level C6/7, RN IV sedation;  Surgeon: Francisco Oshea MD;  Location: [facility] PAIN MGT;  Service: Pain Management;  Laterality: N/A;    EPIDURAL STEROID INJECTION INTO LUMBAR SPINE Bilateral 11/12/2019    Procedure: TF XANDER L4/5;  Surgeon: Desean Dias MD;  Location: [facility] PAIN MGT;  Service: Pain Management;  Laterality: Bilateral;    HYSTERECTOMY      INJECTION OF ANESTHETIC AGENT AROUND MEDIAL BRANCH NERVES INNERVATING LUMBAR FACET JOINT Bilateral 01/31/2020    Procedure: Bilateral L3-5 MBB;  Surgeon: Desean Dias MD;  Location: [facility] PAIN MGT;  Service: Pain Management;  Laterality: Bilateral;    INJECTION OF ANESTHETIC AGENT INTO SACROILIAC JOINT Right 11/16/2021    Procedure: Right BLOCK, SACROILIAC JOINT Right GTB with RN IV sedation;  Surgeon: Yao Fulton MD;  Location: [facility] PAIN MGT;  Service: Pain Management;  Laterality: Right;    INJECTION OF ANESTHETIC AGENT INTO SACROILIAC JOINT Bilateral 07/28/2023    Procedure: Bilateral GT bursa + bilateral SIJ injection;  Surgeon: Francisco Oshea MD;  Location: [facility] PAIN MGT;  Service: Pain Management;   Laterality: Bilateral;    INJECTION OF JOINT Bilateral 07/09/2020    Procedure: Bilateral shoulder GH Joint injection with local;  Surgeon: Desean Dias MD;  Location: [facility] PAIN MGT;  Service: Pain Management;  Laterality: Bilateral;    INJECTION OF JOINT Bilateral 07/28/2023    Procedure: Bilateral GT bursa + bilateral SIJ injection NEEDS ADDITIONAL SEDATION AND MORE TIME BEFORE INJECTION RN IV Sedation;  Surgeon: Francisco Oshea MD;  Location: [facility] PAIN MGT;  Service: Pain Management;  Laterality: Bilateral;    INJECTION OF JOINT N/A 10/25/2023    Procedure: Sacrococcygeal joint injection;  Surgeon: Francisco Oshea MD;  Location: [facility] PAIN MGT;  Service: Pain Management;  Laterality: N/A;    OOPHORECTOMY      SELECTIVE INJECTION OF ANESTHETIC AGENT AROUND LUMBAR SPINAL NERVE ROOT BY TRANSFORAMINAL APPROACH Bilateral 04/26/2023    Procedure: Bilateral L4/5 TF XANDER RN IV Sedation;  Surgeon: Francisco Oshea MD;  Location: [facility] PAIN MGT;  Service: Pain Management;  Laterality: Bilateral;    TRANSFORAMINAL EPIDURAL INJECTION OF STEROID Bilateral 07/23/2019    Procedure: Bilateral L3/4 Transforaminal Epidural Steroid Injection;  Surgeon: Desean Dias MD;  Location: [facility] PAIN [facility]GT;  Service: Pain Management;  Laterality: Bilateral;    TRANSFORAMINAL EPIDURAL INJECTION OF STEROID Bilateral 03/10/2020    Procedure: Right T12/L1 TF XANDER with local;  Surgeon: Desean Dias MD;  [facility]: [facility];  Service: Pain Management;  Laterality: Bilateral;    TRANSFORAMINAL EPIDURAL INJECTION OF STEROID Right 06/19/2020    Procedure: Right T12/L1 TFESI Covid day of procedure;  Surgeon: Desean Dias MD;  [facility]: [facility];  Service: Pain Management;  Laterality: Right;       Social History     Tobacco Use    Smoking status: Former     Current packs/day: 0.00     Average packs/day: 1 pack/day for 42.3 years (42.3 ttl pk-yrs)     Types: Cigarettes     Start date: 1962     Quit date: 4/11/2004     Years since  quittin.1    Smokeless tobacco: Former   Substance Use Topics    Alcohol use: No    Drug use: Never       Family History   Problem Relation Name Age of Onset    No Known Problems Mother      No Known Problems Father      Breast cancer Maternal Aunt      Breast cancer Maternal Aunt      Breast cancer Maternal Aunt         Review of Systems   Cardiovascular:  Positive for chest pain, dyspnea on exertion and palpitations. Negative for syncope.   Genitourinary: Negative.    Neurological: Negative.        Current Outpatient Medications on File Prior to Visit   Medication Sig    albuterol (PROVENTIL) 2.5 mg /3 mL (0.083 %) nebulizer solution EMPTY 1 VIAL INTO NEBULIZER EVERY 4 HOURS AS NEEDED FRO RESCUE    albuterol (PROVENTIL/VENTOLIN HFA) 90 mcg/actuation inhaler INHALE TWO PUFFS INTO THE LUNGS EVERY 4 HOURS AS NEEDED    albuterol-budesonide (AIRSUPRA) 90-80 mcg/actuation Inhale 2 puffs into the lungs every 4 (four) hours as needed (wheezing, cough).    APTIOM 400 mg Tab tablet Take 400 mg by mouth.    atorvastatin (LIPITOR) 40 MG tablet TAKE 1 TABLET BY MOUTH ONCE DAILY    cholecalciferol, vitamin D3, 1,250 mcg (50,000 unit) capsule Take 1 capsule (50,000 Units total) by mouth every 7 days.    clopidogreL (PLAVIX) 75 mg tablet Take 1 tablet (75 mg total) by mouth once daily.    denosumab (PROLIA) 60 mg/mL Syrg every 6 (six) months.    dorzolamide-timolol 2-0.5% (COSOPT) 22.3-6.8 mg/mL ophthalmic solution Place 1 drop into both eyes every 12 (twelve) hours.    empagliflozin (JARDIANCE) 10 mg tablet Take 1 tablet (10 mg total) by mouth once daily.    EScitalopram oxalate (LEXAPRO) 20 MG tablet TAKE 1 TABLET BY MOUTH ONCE DAILY    fluconazole (DIFLUCAN) 150 MG Tab Take 1 tablet (150 mg total) by mouth once daily.    fluocinonide (LIDEX) 0.05 % external solution Apply topically 2 (two) times daily. Use on itchy spots on scalp and ear    fluticasone propionate (FLONASE) 50 mcg/actuation nasal spray USE 2 SPRAYS INTO  EACH NOSTRIL ONCE A DAY AT 6AM AS DIRECTED    fluticasone-umeclidin-vilanter (TRELEGY ELLIPTA) 200-62.5-25 mcg inhaler INHALE 1 PUFF INTO THE LUNGS DAILY    furosemide (LASIX) 20 MG tablet Take 1 tablet (20 mg total) by mouth daily as needed (for edema, swelling, fluid build up, or 3 pound weight gain in 24 hours).    hydrOXYzine HCL (ATARAX) 25 MG tablet TAKE 1 TABLET BY MOUTH EVERY EVENING    ibuprofen (ADVIL,MOTRIN) 800 MG tablet Take 1 tablet (800 mg total) by mouth 3 (three) times daily.    ipratropium (ATROVENT) 42 mcg (0.06 %) nasal spray USE 2 SPRAYS INTO EACH NOSTRIL FOUR TIMES DAILY    isosorbide mononitrate (IMDUR) 120 MG 24 hr tablet Take 1 tablet (120 mg total) by mouth every evening.    ketoconazole (NIZORAL) 2 % cream Apply topically 2 (two) times daily. For dry flaky patches of ear.    latanoprost 0.005 % ophthalmic solution Place 1 drop into both eyes every evening.    levETIRAcetam (KEPPRA) 750 MG Tab Take 1 tablet (750 mg total) by mouth 2 (two) times daily.    magnesium oxide (MAG-OX) 400 mg (241.3 mg magnesium) tablet Take 1 tablet (400 mg total) by mouth once daily.    montelukast (SINGULAIR) 10 mg tablet Take 1 tablet (10 mg total) by mouth once daily.    montelukast (SINGULAIR) 10 mg tablet Take 1 tablet (10 mg total) by mouth once daily.    mucus clearing device (AEROBIKA OSCILLATING PEP SYSTM) by Misc.(Non-Drug; Combo Route) route 4 (four) times daily.    nitroGLYCERIN (NITROSTAT) 0.4 MG SL tablet Place 1 tablet (0.4 mg total) under the tongue every 5 (five) minutes as needed for Chest pain.    omeprazole (PRILOSEC) 40 MG capsule Take 40 mg by mouth every morning.    ondansetron (ZOFRAN-ODT) 4 MG TbDL Take 1 tablet (4 mg total) by mouth every 6 (six) hours as needed.    pantoprazole (PROTONIX) 40 MG tablet Take 40 mg by mouth 2 (two) times daily.    potassium chloride SA (K-DUR,KLOR-CON) 20 MEQ tablet Take 1 tablet (20 mEq total) by mouth once daily. Take extra dose with Lasix - fluid pill     ranolazine (RANEXA) 1,000 mg Tb12 Take 1 tablet (1,000 mg total) by mouth 2 (two) times daily.    roflumilast (DALIRESP) 500 mcg Tab TAKE ONE TABLET BY MOUTH DAILY    sacubitriL-valsartan (ENTRESTO) 24-26 mg per tablet Take 1 tablet by mouth 2 (two) times daily.    sodium chloride 3% 3 % nebulizer solution Take 4 mLs by nebulization as needed for Cough (chest congestion).    spironolactone (ALDACTONE) 50 MG tablet Take 1 tablet (50 mg total) by mouth once daily.    tezepelumab-ekko 210 mg/1.91 mL (110 mg/mL) PnIj Inject 210 mg into the skin every 28 days.    tiZANidine (ZANAFLEX) 4 MG tablet Take 4 mg by mouth every 8 (eight) hours.    triamcinolone acetonide 0.025% (KENALOG) 0.025 % cream Apply topically 2 (two) times daily. PRN rash and itching of ear. Mild steroid cream.    [DISCONTINUED] carvediloL (COREG) 12.5 MG tablet Take 1 tablet (12.5 mg total) by mouth 2 (two) times daily with meals.    [DISCONTINUED] diltiaZEM (CARDIZEM CD) 120 MG Cp24 Take 1 capsule (120 mg total) by mouth once daily.    cholestyramine (QUESTRAN) 4 gram packet TAKE 1 PACKET BY MOUTH TWICE DAILY AS DIRECTED (Patient not taking: Reported on 5/23/2024)    dicyclomine (BENTYL) 10 MG capsule Take 1 capsule (10 mg total) by mouth 2 (two) times daily. (Patient not taking: Reported on 5/23/2024)    gabapentin (NEURONTIN) 300 MG capsule Take 1 capsule (300 mg total) by mouth 3 (three) times daily. (Patient not taking: Reported on 5/23/2024)    HYDROcodone-acetaminophen (NORCO) 5-325 mg per tablet Take 1 tablet by mouth every 6 (six) hours as needed for Pain. (Patient not taking: Reported on 5/23/2024)    linezolid (ZYVOX) 600 mg Tab Take 1 tablet (600 mg total) by mouth every 12 (twelve) hours. (Patient not taking: Reported on 5/23/2024)    MYRBETRIQ 50 mg Tb24 TAKE 1 TABLET BY MOUTH ONCE DAILY (Patient not taking: Reported on 5/8/2024)    predniSONE (DELTASONE) 10 MG tablet Take 1 tablet (10 mg total) by mouth once daily. (Patient not  taking: Reported on 5/23/2024)     No current facility-administered medications on file prior to visit.       Objective:   Objective:  Wt Readings from Last 3 Encounters:   05/23/24 48.1 kg (106 lb 0.7 oz)   05/22/24 47.6 kg (104 lb 15 oz)   05/17/24 47.4 kg (104 lb 8 oz)     Temp Readings from Last 3 Encounters:   05/18/24 97.8 °F (36.6 °C) (Oral)   05/02/24 98.2 °F (36.8 °C) (Tympanic)   04/29/24 98.6 °F (37 °C)     BP Readings from Last 3 Encounters:   05/23/24 112/67   05/22/24 102/70   05/18/24 135/63     Pulse Readings from Last 3 Encounters:   05/23/24 82   05/22/24 73   05/18/24 98       Physical Exam  Vitals reviewed.   Constitutional:       Appearance: She is well-developed.   Neck:      Vascular: No carotid bruit.   Cardiovascular:      Rate and Rhythm: Normal rate and regular rhythm.      Pulses: Intact distal pulses.      Heart sounds: Normal heart sounds. No murmur heard.  Pulmonary:      Breath sounds: Rales present.   Neurological:      Mental Status: She is oriented to person, place, and time.         Lab Results   Component Value Date    CHOL 158 05/02/2024    CHOL 142 04/25/2024    CHOL 144 08/31/2023     Lab Results   Component Value Date    HDL 48 05/02/2024    HDL 56 04/25/2024    HDL 69 08/31/2023     Lab Results   Component Value Date    LDLCALC 87.6 05/02/2024    LDLCALC 72 04/25/2024    LDLCALC 62.4 (L) 08/31/2023     Lab Results   Component Value Date    TRIG 112 05/02/2024    TRIG 72 04/25/2024    TRIG 63 08/31/2023     Lab Results   Component Value Date    CHOLHDL 30.4 05/02/2024    CHOLHDL 47.9 08/31/2023    CHOLHDL 39.7 08/14/2023       Chemistry        Component Value Date/Time     05/08/2024 1213    K 4.5 05/08/2024 1213     05/08/2024 1213    CO2 22 (L) 05/08/2024 1213    BUN 14 05/08/2024 1213    CREATININE 1.2 05/08/2024 1213     05/08/2024 1213        Component Value Date/Time    CALCIUM 9.9 05/08/2024 1213    ALKPHOS 51 (L) 11/14/2023 1348    AST 17 11/14/2023  1348    ALT 16 11/14/2023 1348    BILITOT 0.3 11/14/2023 1348    ESTGFRAFRICA 64 04/26/2024 0557    ESTGFRAFRICA >60 06/02/2022 1549    EGFRNONAA >60 06/02/2022 1549          Lab Results   Component Value Date    TSH 1.004 09/15/2023     Lab Results   Component Value Date    INR 1.2 05/08/2024    INR 1.0 11/05/2020    INR 1.0 08/20/2020     Lab Results   Component Value Date    WBC 11.51 05/08/2024    HGB 10.9 (L) 05/08/2024    HCT 34.7 (L) 05/08/2024    MCV 94 05/08/2024     05/08/2024     BNP  @LABRCNTIP(BNP,BNPTRIAGEBLO)@  CrCl cannot be calculated (Patient's most recent lab result is older than the maximum 7 days allowed.).     Imaging:  ======    No results found for this or any previous visit.    Results for orders placed during the hospital encounter of 06/21/23    US Carotid Bilateral    Narrative  EXAMINATION:  US CAROTID BILATERAL    TECHNIQUE:  Grayscale and color Doppler ultrasound examination of the carotid and vertebral artery systems bilaterally.  Stenosis estimates are per the NASCET measurement criteria.    COMPARISON:  None.    FINDINGS:  Right:    Internal Carotid Artery (ICA) peak systolic velocity 69 cm/sec    ICA/CCA peak systolic velocity ratio: 1.0    Plaque formation: Minimal    Vertebral artery: Antegrade flow and normal waveform.    Left:    Internal Carotid Artery (ICA)  peak systolic velocity 62 cm/sec    ICA/CCA peak systolic velocity ratio: 0.8    Plaque formation: Minimal    Vertebral artery: Antegrade flow and normal waveform.    Impression  No evidence of a hemodynamically significant carotid bifurcation stenosis.      Electronically signed by: Kristopher Velasquez MD  Date:    06/21/2023  Time:    15:13    Results for orders placed during the hospital encounter of 04/15/24    X-Ray Chest PA And Lateral    Narrative  EXAMINATION:  XR CHEST PA AND LATERAL    CLINICAL HISTORY:  Cough, unspecified    TECHNIQUE:  PA and lateral views of the chest were  performed.    COMPARISON:  04/05/2024    FINDINGS:  The cardiac and mediastinal silhouettes appear within normal limits.   Diffuse coarsening of the interstitial markings remains unchanged from multiple prior examinations.  Underlying interstitial lung disease not excluded.  No focal parenchymal consolidation or definite pleural effusion demonstrated.  No acute osseous findings demonstrated.    Impression  Unchanged appearance of the chest as above      Electronically signed by: Neri Husain MD  Date:    04/15/2024  Time:    10:33    No results found for this or any previous visit.    No valid procedures specified.    No results found for this or any previous visit.      Results for orders placed during the hospital encounter of 06/02/23    Nuclear Stress - Cardiology Interpreted    Interpretation Summary    Normal myocardial perfusion scan. There is no evidence of myocardial ischemia or infarction.    The gated perfusion images showed an ejection fraction of 54% at rest. The gated perfusion images showed an ejection fraction of 62% post stress.    There is normal wall motion at rest and post stress.    LV cavity size is normal at rest and normal at stress.    The ECG portion of the study is negative for ischemia.    The patient reported no chest pain during the stress test.      Results for orders placed during the hospital encounter of 06/02/23    Echo    Interpretation Summary  · The left ventricle is normal in size with concentric hypertrophy and low normal systolic function.  · The estimated ejection fraction is 50%.  · Normal left ventricular diastolic function.  · There is abnormal septal wall motion consistent with left bundle branch block.  · Normal right ventricular size with normal right ventricular systolic function.  · Normal central venous pressure (3 mmHg).  · The estimated PA systolic pressure is 30 mmHg.  · Mild mitral regurgitation.       CONCLUSIONS: 4.2024  1. Normal left ventricular cavity  size. Moderately depressed left ventricular systolic   function. LVEF 35 - 40%.   2. Normal right ventricular size and systolic function.   3. The left atrium is normal in size.   4. Normal right atrial size and morphology.   5. Mild mitral valve regurgitation. Mitral valve leaflets appear mildly thickened.   6. Aortic valve cusps appear mildly calcified. AV peak PG 13 mmHg. AV Mean PG 7 mmHg. OLIVIA   (VTI) 1.38 cm2.   7. No or trivial tricuspid valve regurgitation.   8. Mild pulmonic valve regurgitation.   9. Normal pericardium without evidence of pericardial effusion.   10. There is mild atherosclerosis visualized in the abdominal aorta by limited views.     Results for orders placed during the hospital encounter of 05/17/24    Cardiac catheterization    Conclusion    The pre-procedure left ventricular end diastolic pressure was 18.    The estimated blood loss was none.    There was non-obstructive coronary artery disease..    The filling pressures on the left were mildly elevated.    Normal pulmonary artery pressures    The procedure log was documented by Documenter: Chrissy De La Torre RN and verified by Rachelle Sarabia MD.    Date: 5/17/2024  Time: 10:28 AM      Diagnostic Results:  ECG: Reviewed    The ASCVD Risk score (Kusum DK, et al., 2019) failed to calculate for the following reasons:    The patient has a prior MI or stroke diagnosis        Assessment and Plan:   Primary hypertension  -     bisoprolol (ZEBETA) 5 MG tablet; Take 1 tablet (5 mg total) by mouth once daily.  Dispense: 30 tablet; Refill: 11    Episodic lightheadedness  -     CV Ultrasound Bilateral Doppler Carotid; Future    Atherosclerosis of native coronary artery of native heart without angina pectoris  -     CV Ultrasound Bilateral Doppler Carotid; Future    Palpitations  -     Cardiac Monitor - 3-15 Day Adult (Cupid Only); Future    History of coronary angiogram    History of CVA (cerebrovascular accident)    Atherosclerosis of native  coronary artery of native heart with unstable angina pectoris    Hypotension, unspecified hypotension type    Left ventricular ejection fraction less than 40%      Continue with Plavix.  Continue with Imdur 120.  Continue with the Ranexa.    DC carvedilol to avoid any interaction with the lungs and stop Cardizem given depressed EF.  Start bisoprolol.  If more BP meds needed will add amlodipine.  Change Lasix from p.r.n. to Lasix daily.  Episodic Dyspnea disproportionate to her mild pulmonary fibrosis, normal O2 sat on room air and only mildly elevated filling pressures on recent heart catheterization.    No evidence of critical CAD to explain also her symptoms.    We will get a 14 days monitor to rule out possible tachycardia related symptoms.    If above normal recommend to follow with primary care for possible anxiety versus panic attacks.  Reviewed coronary angiogram patient.  Discussed with her risks and benefits.  She is accompanied by family member.  She is agreeable with the procedure.  Reviewed all tests and above medical conditions with patient in detail and formulated treatment plan.  Risk factor modification discussed.   Cardiac low salt diet discussed.  Maintaining healthy weight and weight loss goals were discussed in clinic.    Follow up in 3 months

## 2024-05-27 ENCOUNTER — HOSPITAL ENCOUNTER (OUTPATIENT)
Dept: CARDIOLOGY | Facility: HOSPITAL | Age: 79
Discharge: HOME | End: 2024-05-27
Attending: INTERNAL MEDICINE
Payer: MEDICARE

## 2024-05-27 VITALS
DIASTOLIC BLOOD PRESSURE: 62 MMHG | BODY MASS INDEX: 18.1 KG/M2 | SYSTOLIC BLOOD PRESSURE: 133 MMHG | WEIGHT: 106 LBS | HEIGHT: 64 IN

## 2024-05-27 DIAGNOSIS — I25.10 ATHEROSCLEROSIS OF NATIVE CORONARY ARTERY OF NATIVE HEART WITHOUT ANGINA PECTORIS: ICD-10-CM

## 2024-05-27 DIAGNOSIS — R42 EPISODIC LIGHTHEADEDNESS: ICD-10-CM

## 2024-05-27 DIAGNOSIS — R00.2 PALPITATIONS: ICD-10-CM

## 2024-05-27 LAB
LEFT ARM DIASTOLIC BLOOD PRESSURE: 63 MMHG
LEFT ARM SYSTOLIC BLOOD PRESSURE: 136 MMHG
LEFT CBA DIAS: 15 CM/S
LEFT CBA SYS: 67 CM/S
LEFT CCA DIST DIAS: 16 CM/S
LEFT CCA DIST SYS: 68 CM/S
LEFT CCA MID DIAS: 14 CM/S
LEFT CCA MID SYS: 81 CM/S
LEFT CCA PROX DIAS: 11 CM/S
LEFT CCA PROX SYS: 74 CM/S
LEFT ECA DIAS: 8 CM/S
LEFT ECA SYS: 65 CM/S
LEFT ICA DIST DIAS: 28 CM/S
LEFT ICA DIST SYS: 103 CM/S
LEFT ICA MID DIAS: 16 CM/S
LEFT ICA MID SYS: 67 CM/S
LEFT ICA PROX DIAS: 11 CM/S
LEFT ICA PROX SYS: 58 CM/S
LEFT VERTEBRAL DIAS: 7 CM/S
LEFT VERTEBRAL SYS: 43 CM/S
OHS CV CAROTID RIGHT ICA EDV HIGHEST: 18
OHS CV CAROTID ULTRASOUND LEFT ICA/CCA RATIO: 1.51
OHS CV CAROTID ULTRASOUND RIGHT ICA/CCA RATIO: 1.42
OHS CV PV CAROTID LEFT HIGHEST CCA: 81
OHS CV PV CAROTID LEFT HIGHEST ICA: 103
OHS CV PV CAROTID RIGHT HIGHEST CCA: 65
OHS CV PV CAROTID RIGHT HIGHEST ICA: 92
OHS CV US CAROTID LEFT HIGHEST EDV: 28
RIGHT ARM DIASTOLIC BLOOD PRESSURE: 62 MMHG
RIGHT ARM SYSTOLIC BLOOD PRESSURE: 133 MMHG
RIGHT CBA DIAS: 11 CM/S
RIGHT CBA SYS: 55 CM/S
RIGHT CCA DIST DIAS: 14 CM/S
RIGHT CCA DIST SYS: 65 CM/S
RIGHT CCA MID DIAS: 12 CM/S
RIGHT CCA MID SYS: 60 CM/S
RIGHT CCA PROX DIAS: 9 CM/S
RIGHT CCA PROX SYS: 56 CM/S
RIGHT ECA DIAS: 6 CM/S
RIGHT ECA SYS: 90 CM/S
RIGHT ICA DIST DIAS: 18 CM/S
RIGHT ICA DIST SYS: 92 CM/S
RIGHT ICA MID DIAS: 14 CM/S
RIGHT ICA MID SYS: 68 CM/S
RIGHT ICA PROX DIAS: 11 CM/S
RIGHT ICA PROX SYS: 55 CM/S
RIGHT VERTEBRAL DIAS: 13 CM/S
RIGHT VERTEBRAL SYS: 61 CM/S

## 2024-05-27 PROCEDURE — 93880 EXTRACRANIAL BILAT STUDY: CPT

## 2024-05-27 PROCEDURE — 93880 EXTRACRANIAL BILAT STUDY: CPT | Mod: 26,,, | Performed by: INTERNAL MEDICINE

## 2024-05-27 PROCEDURE — 93248 EXT ECG>7D<15D REV&INTERPJ: CPT | Mod: HCNC,,, | Performed by: INTERNAL MEDICINE

## 2024-05-29 ENCOUNTER — TELEPHONE (OUTPATIENT)
Dept: CARDIOLOGY | Facility: CLINIC | Age: 79
End: 2024-05-29
Payer: MEDICARE

## 2024-05-29 NOTE — TELEPHONE ENCOUNTER
Contacted patient; Patient received and understood results with no questions or concerns.         ----- Message from Suzan Cruz sent at 5/29/2024 11:33 AM CDT -----  Contact: Shireen  Patient is calling she did get her results in the portal, but she can not understand them and want a callback from the office to clarify. Please callback [phone]

## 2024-05-30 ENCOUNTER — DOCUMENTATION ONLY (OUTPATIENT)
Dept: INTERNAL MEDICINE | Facility: CLINIC | Age: 79
End: 2024-05-30
Payer: MEDICARE

## 2024-06-04 LAB
ACID FAST MOD KINY STN SPEC: NORMAL
MYCOBACTERIUM SPEC QL CULT: NORMAL

## 2024-06-06 ENCOUNTER — TELEPHONE (OUTPATIENT)
Dept: CARDIOLOGY | Facility: HOSPITAL | Age: 79
End: 2024-06-06
Payer: MEDICARE

## 2024-06-06 NOTE — TELEPHONE ENCOUNTER
Patient called due to her vital patch not working when she changed it out on 6/3/24, she reach out to company and was advised that they will ship her a new patch.      Patient ask if she can come in and get another patch if she doesn't receive the one in the mail today and I advised that she could.      Olivia also sent a email to [employer] requesting monitoring period being extended once pt receive new patch.      Olivia also gotten with tech at [facility] to let her know that pt may come tomorrow around 10/1030.

## 2024-06-06 NOTE — TELEPHONE ENCOUNTER
----- Message from Neftaly Combs sent at 6/6/2024 12:29 PM CDT -----  Contact: Shireen  Please advise; Patient needs help with monitor  ----- Message -----  From: Felipa Nahs  Sent: 6/6/2024  12:00 PM CDT  To: [facility] Staff    .Patient is calling to speak with the nurse regarding Holter  . Reports Holter monitor is broken . Please give patient a call back at   [phone]

## 2024-06-07 ENCOUNTER — TELEPHONE (OUTPATIENT)
Dept: PULMONOLOGY | Facility: HOSPITAL | Age: 79
End: 2024-06-07
Payer: MEDICARE

## 2024-06-07 NOTE — TELEPHONE ENCOUNTER
Called pt regarding Pulmonary Rehab referral. MILAGROS. Per pt, she is still receiving HH and PT services. Pt requests a f/u call in about 6 weeks.

## 2024-06-09 NOTE — PROGRESS NOTES
Subjective:     Patient ID: Shireen Felix is a 76 y.o. female.    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 She states that she still has pain around the CMC joint of both thumbs. She was last injected 02/16/22 with good relief until recently and requests bilateral reinjection today. Patient also states that her right index MCP joint has been swollen and bothering her. She has thumb spica splints. Denies fever, chills, nausea, vomiting, CP, SOB.    Past Medical History:   Diagnosis Date    Abnormal ECG 8/5/2019    Aneurysm     Anticoagulant long-term use     Arthritis     CAD in native artery 8/5/2019    COPD (chronic obstructive pulmonary disease)     Diabetes mellitus     Fall 10/10/2019    Formatting of this note might be different from the original. Found sitting on floor next to bed last night Mild confusion today Does not recall falling or how she ended up on floor UA today Labs yesterday unremarkable    Glaucoma     Hypertension     Seizures     Shingles 05/27/2017    Stroke      Past Surgical History:   Procedure Laterality Date    BRAIN SURGERY      CARDIAC CATHETERIZATION      CORONARY ANGIOPLASTY      EPIDURAL STEROID INJECTION INTO LUMBAR SPINE Bilateral 11/12/2019    Procedure: TF XANDER L4/5;  Surgeon: Desean Dias MD;  Location: [facility];  Service: Pain Management;  Laterality: Bilateral;    HYSTERECTOMY      INJECTION OF ANESTHETIC AGENT AROUND MEDIAL BRANCH NERVES INNERVATING LUMBAR FACET JOINT Bilateral 1/31/2020    Procedure: Bilateral L3-5 MBB;  Surgeon: Desean Dias MD;  Location: [facility];  Service: Pain Management;  Laterality: Bilateral;    INJECTION OF ANESTHETIC AGENT INTO SACROILIAC JOINT Right 11/16/2021    Procedure: Right BLOCK, SACROILIAC JOINT Right GTB with RN IV sedation;  Surgeon: Yao Fulton MD;  Location: [facility]  PAIN [facility]GT;  Service: Pain Management;  Laterality: Right;    INJECTION OF JOINT Bilateral 2020    Procedure: Bilateral shoulder GH Joint injection with local;  Surgeon: Desean Dias MD;  Location: [facility];  Service: Pain Management;  Laterality: Bilateral;    TRANSFORAMINAL EPIDURAL INJECTION OF STEROID Bilateral 2019    Procedure: Bilateral L3/4 Transforaminal Epidural Steroid Injection;  Surgeon: Desean Dias MD;  Location: [facility];  Service: Pain Management;  Laterality: Bilateral;    TRANSFORAMINAL EPIDURAL INJECTION OF STEROID Bilateral 3/10/2020    Procedure: Right T12/L1 TF XANDER with local;  Surgeon: Desean Dias MD;  Location: [facility];  Service: Pain Management;  Laterality: Bilateral;    TRANSFORAMINAL EPIDURAL INJECTION OF STEROID Right 2020    Procedure: Right T12/L1 TFESI Covid day of procedure;  Surgeon: Desean Dias MD;  Location: [facility];  Service: Pain Management;  Laterality: Right;     Family History   Problem Relation Age of Onset    No Known Problems Mother     No Known Problems Father      Social History     Socioeconomic History    Marital status: Single   Tobacco Use    Smoking status: Former Smoker     Packs/day: 1.00     Years: 10.00     Pack years: 10.00     Types: Cigarettes     Quit date: 2004     Years since quittin.1    Smokeless tobacco: Never Used   Substance and Sexual Activity    Alcohol use: No    Drug use: Never    Sexual activity: Not Currently   Social History Narrative    No pets or smokers in household.     Medication List with Changes/Refills   Current Medications    ACETAMINOPHEN (TYLENOL) 500 MG TABLET    Take 500 mg by mouth as needed for Pain.    ALBUTEROL (PROVENTIL) 2.5 MG /3 ML (0.083 %) NEBULIZER SOLUTION    Take 3 mLs (2.5 mg total) by nebulization every 4 (four) hours as needed. Rescue    ALBUTEROL (PROVENTIL) 2.5 MG /3 ML (0.083 %) NEBULIZER SOLUTION    Take 2.5 mg by nebulization every 4  (four) hours as needed for Wheezing. Rescue    ALBUTEROL (PROVENTIL/VENTOLIN HFA) 90 MCG/ACTUATION INHALER    INHALE 2 PUFFS INTO THE LUNGS EVERY 4 HOURS AS NEEDED    AMLODIPINE (NORVASC) 10 MG TABLET    Take 1 tablet (10 mg total) by mouth once daily.    APTIOM 400 MG TAB TABLET    TAKE 1 TABLET BY MOUTH EVERY EVENING WITH 800MG TABLET    APTIOM 800 MG TAB    TAKE 1 TABLET BY MOUTH EVERY EVENING WITH 400MG TABLET    ATORVASTATIN (LIPITOR) 40 MG TABLET    TAKE 1 TABLET BY MOUTH ONCE DAILY    AZITHROMYCIN (Z-EMEKA) 250 MG TABLET    Take 2 tablets by mouth on day 1; Take 1 tablet by mouth on days 2-5    AZITHROMYCIN (Z-EMEKA) 250 MG TABLET    Take as directed    CHOLECALCIFEROL, VITAMIN D3, 1,250 MCG (50,000 UNIT) CAPSULE    Take 1 capsule (50,000 Units total) by mouth every 7 days.    CLOPIDOGREL (PLAVIX) 75 MG TABLET    TAKE 1 TABLET BY MOUTH ONCE DAILY    DALIRESP 500 MCG TAB    TAKE 1 TABLET BY MOUTH ONCE DAILY    DENOSUMAB (PROLIA) 60 MG/ML SYRG    every 6 (six) months.    DEXILANT 60 MG CAPSULE    Take 1 capsule by mouth once daily.    DICLOFENAC (VOLTAREN) 25 MG TBEC    TAKE 1 TABLET BY MOUTH TWICE DAILY    DILTIAZEM (CARDIZEM LA) 120 MG 24 HR TABLET    Take 1 tablet (120 mg total) by mouth once daily.    ESCITALOPRAM OXALATE (LEXAPRO) 20 MG TABLET    TAKE 1 TABLET BY MOUTH once a day    FLUTICASONE PROPIONATE (FLONASE) 50 MCG/ACTUATION NASAL SPRAY    2 sprays (100 mcg total) by Each Nostril route once daily at 6am.    FLUTICASONE-UMECLIDIN-VILANTER (TRELEGY ELLIPTA) 200-62.5-25 MCG INHALER    Inhale 1 puff into the lungs once daily.    GABAPENTIN (NEURONTIN) 100 MG CAPSULE    Take 100 mg by mouth 2 (two) times daily as needed.    HYDROCHLOROTHIAZIDE (HYDRODIURIL) 25 MG TABLET    TAKE 1 TABLET BY MOUTH ONCE DAILY AS NEEDED    IPRATROPIUM (ATROVENT) 42 MCG (0.06 %) NASAL SPRAY    2 sprays by Nasal route 4 (four) times daily.    KETOCONAZOLE (NIZORAL) 2 % CREAM    Apply topically 2 (two) times daily. For dry flaky  patches of ear.    LATANOPROST 0.005 % OPHTHALMIC SOLUTION    Place 1 drop into both eyes every evening.    METFORMIN (GLUCOPHAGE) 500 MG TABLET    TAKE 1 TABLET BY MOUTH ONCE DAILY    METOPROLOL TARTRATE (LOPRESSOR) 50 MG TABLET    Take 1 tablet (50 mg total) by mouth 2 (two) times daily.    MIRABEGRON (MYRBETRIQ) 50 MG TB24    Take 1 tablet (50 mg total) by mouth once daily.    MONTELUKAST (SINGULAIR) 10 MG TABLET    TAKE 1 TABLET BY MOUTH ONCE DAILY    NITROGLYCERIN (NITROSTAT) 0.4 MG SL TABLET    DISSOLVE 1 TABLET UNDER TONGUE EVERY 5 MINUTES FOR CHEST PAIN (MAY TAKE UP TO 3 DOSES THEN SEEK MEDICAL ATTENTION)    OMEPRAZOLE (PRILOSEC) 40 MG CAPSULE    TAKE 1 CAPSULE BY MOUTH ONCE DAILY IN THE MORNING    PREDNISONE (DELTASONE) 5 MG TABLET    4 tabs x 5 days, then 3 tabs x 5 days, then 2 tabs x 5 days, then 5 mg daily x 15 days    RANOLAZINE (RANEXA) 1,000 MG TB12    TAKE 1 TABLET BY MOUTH TWICE DAILY    TIMOLOL MALEATE 0.5% (TIMOPTIC) 0.5 % DROP    Place 1 drop into both eyes every morning.    TIZANIDINE (ZANAFLEX) 4 MG TABLET    TAKE 1 TABLET BY MOUTH TWICE DAILY AS NEEDED    TRAMADOL (ULTRAM) 50 MG TABLET    Take 1 tablet (50 mg total) by mouth every 8 (eight) hours as needed for Pain.    TRIAMCINOLONE ACETONIDE 0.025% (KENALOG) 0.025 % CREAM    Apply topically 2 (two) times daily. PRN rash and itching of ear. Mild steroid cream.    VALSARTAN (DIOVAN) 320 MG TABLET    Take 1 tablet (320 mg total) by mouth once daily.     Review of patient's allergies indicates:   Allergen Reactions    Penicillins Anaphylaxis, Hives, Shortness Of Breath and Swelling    Adhesive Rash    Betadine [povidone-iodine] Rash     Pt confirms has completed CT w/ IV contrast-iodine without reaction or need for pre-medication.    Doxycycline Nausea Only    Oxycodone Other (See Comments)     Fatigue      Sulfa (sulfonamide antibiotics) Itching    Zanaflex [tizanidine] Anxiety     Review of Systems   Constitutional: Negative for  malaise/fatigue.   Eyes: Negative for double vision and visual disturbance.   Cardiovascular: Negative for chest pain.   Respiratory: Negative for shortness of breath.    Endocrine: Negative for cold intolerance.   Hematologic/Lymphatic: Does not bruise/bleed easily.   Skin: Negative for poor wound healing and suspicious lesions.   Musculoskeletal: Positive for arthritis, joint pain and joint swelling. Negative for gout.   Gastrointestinal: Negative for nausea and vomiting.   Genitourinary: Negative for dysuria.   Neurological: Positive for numbness and paresthesias.   Psychiatric/Behavioral: Negative for memory loss. The patient is not nervous/anxious.    Allergic/Immunologic: Negative for persistent infections.       Objective:   Body mass index is 23 kg/m².  There were no vitals filed for this visit.             General    Constitutional: She is oriented to person, place, and time. She appears well-developed and well-nourished. No distress.   HENT:   Head: Normocephalic.   Eyes: EOM are normal.   Pulmonary/Chest: Effort normal.   Neurological: She is oriented to person, place, and time.   Psychiatric: She has a normal mood and affect.             Right Hand/Wrist Exam     Inspection   Scars: Wrist - absent Hand -  absent  Effusion: Wrist - absent Hand -  absent    Pain   Hand - The patient exhibits pain of the index MCP.  Wrist - The patient exhibits pain of the CMC.    Swelling   Hand - The patient is swollen on the index MCP.  Wrist - The patient is swollen on the CMC.    Other     Neuorologic Exam    Median Distribution: normal  Ulnar Distribution: normal  Radial Distribution: normal    Comments:  The patient has tenderness right thumb basal joint with a positive axial circumduction grind test. Mild swelling noted at the left index MCP joint and CMC joint of the thumb. There are no motor or sensory deficits.      Left Hand/Wrist Exam     Inspection   Scars: Wrist - absent Hand -  absent  Effusion: Wrist -  absent Hand -  absent    Pain   Wrist - The patient exhibits pain of the CMC.    Other     Sensory Exam  Median Distribution: normal  Ulnar Distribution: normal  Radial Distribution: normal    Comments:  Patient exhibits tenderness at left thumb basal joint with a positive axial circumduction grind test.  There are no motor or sensory deficits.          Vascular Exam       Capillary Refill  Right Hand: normal capillary refill  Left Hand: normal capillary refill            No radiographs obtained today.  Assessment:     Encounter Diagnosis   Name Primary?    Localized primary osteoarthritis of carpometacarpal (CMC) joint, unspecified laterality Yes        Plan:       PROCEDURE NOTE:  She has a diagnosis of CMC osteoarthritis. We have discussed surgical and non-surgical options at this point. Risks and benefits of corticosteroid injections discussed in detail. Patient wishes to proceed. After verbal consent and pause for timeout, the base of the thumb was prepped in sterile fashion. The bilateral thumb CMC joint was injected with 0.5 cc Kenalog and 0.5 cc 2% plain lidocaine. The patient tolerated the injection well.     -Patient instructed to wait at least 3 months between injections.  -RTC as needed.    Jennifer Atkins PA-C   [facility]       Disclaimer: This note was prepared using a voice recognition system and is likely to have sound alike errors within the text.        c/o R rib pain, known R rib fractures. Took percocet with no relief. Also c/o constipation

## 2024-06-10 ENCOUNTER — TELEPHONE (OUTPATIENT)
Dept: INTERNAL MEDICINE | Facility: CLINIC | Age: 79
End: 2024-06-10
Payer: MEDICARE

## 2024-06-10 DIAGNOSIS — N18.30 CONTROLLED TYPE 2 DIABETES MELLITUS WITH STAGE 3 CHRONIC KIDNEY DISEASE, WITHOUT LONG-TERM CURRENT USE OF INSULIN: Primary | ICD-10-CM

## 2024-06-10 DIAGNOSIS — E11.22 CONTROLLED TYPE 2 DIABETES MELLITUS WITH STAGE 3 CHRONIC KIDNEY DISEASE, WITHOUT LONG-TERM CURRENT USE OF INSULIN: Primary | ICD-10-CM

## 2024-06-10 NOTE — TELEPHONE ENCOUNTER
----- Message from Suzan Veras sent at 6/10/2024  2:09 PM CDT -----  Contact: Lisa Giraldo  Type:  Diabetic/Medical Supplies Request    Name of Caller:  Lisa Giraldo   What supplies are needed:  Needs new Glucometer (accu check & True Metrix are Approved) & supplies  What is the brand of the supplies:  Accu Check or True Metrix approved by [employer]  Refill or New Rx:  New Rx  If checking glucose, how many times do they check it?:  Once daily  Who prescribed the original supplies:  Dr Chaudhari  Pharmacy/Company Name, Phone #, Location:    [facility] - [facility] [address]  [address] [address]  Phone: [phone] Fax: [phone]  Requesting a Call Back:  No   Best Call Back Number:  [phone]  Additional Information:  Pt called and let them know hers was not working and needs new order for the machine and the supplies. Thank You

## 2024-06-11 ENCOUNTER — OFFICE VISIT (OUTPATIENT)
Dept: DERMATOLOGY | Facility: CLINIC | Age: 79
End: 2024-06-11
Payer: MEDICARE

## 2024-06-11 ENCOUNTER — OFFICE VISIT (OUTPATIENT)
Dept: ALLERGY | Facility: CLINIC | Age: 79
End: 2024-06-11
Payer: MEDICARE

## 2024-06-11 ENCOUNTER — DOCUMENTATION ONLY (OUTPATIENT)
Dept: INTERNAL MEDICINE | Facility: CLINIC | Age: 79
End: 2024-06-11
Payer: MEDICARE

## 2024-06-11 VITALS — SYSTOLIC BLOOD PRESSURE: 107 MMHG | DIASTOLIC BLOOD PRESSURE: 59 MMHG | TEMPERATURE: 98 F | HEART RATE: 68 BPM

## 2024-06-11 DIAGNOSIS — L57.0 ACTINIC KERATOSES: ICD-10-CM

## 2024-06-11 DIAGNOSIS — J45.50 SEVERE PERSISTENT ASTHMA WITHOUT COMPLICATION: Primary | ICD-10-CM

## 2024-06-11 DIAGNOSIS — T50.905D ADVERSE EFFECT OF DRUG, SUBSEQUENT ENCOUNTER: ICD-10-CM

## 2024-06-11 DIAGNOSIS — B02.29 POSTHERPETIC NEURALGIA: Primary | ICD-10-CM

## 2024-06-11 DIAGNOSIS — J31.0 CHRONIC NONALLERGIC RHINITIS: ICD-10-CM

## 2024-06-11 PROBLEM — T50.905A DRUG REACTION: Status: ACTIVE | Noted: 2024-06-11

## 2024-06-11 PROCEDURE — 17000 DESTRUCT PREMALG LESION: CPT | Mod: HCNC,S$GLB,, | Performed by: STUDENT IN AN ORGANIZED HEALTH CARE EDUCATION/TRAINING PROGRAM

## 2024-06-11 PROCEDURE — 3074F SYST BP LT 130 MM HG: CPT | Mod: HCNC,CPTII,S$GLB, | Performed by: STUDENT IN AN ORGANIZED HEALTH CARE EDUCATION/TRAINING PROGRAM

## 2024-06-11 PROCEDURE — 99999 PR PBB SHADOW E&M-EST. PATIENT-LVL IV: CPT | Mod: PBBFAC,HCNC,, | Performed by: STUDENT IN AN ORGANIZED HEALTH CARE EDUCATION/TRAINING PROGRAM

## 2024-06-11 PROCEDURE — 3288F FALL RISK ASSESSMENT DOCD: CPT | Mod: HCNC,CPTII,S$GLB, | Performed by: STUDENT IN AN ORGANIZED HEALTH CARE EDUCATION/TRAINING PROGRAM

## 2024-06-11 PROCEDURE — 3078F DIAST BP <80 MM HG: CPT | Mod: HCNC,CPTII,S$GLB, | Performed by: STUDENT IN AN ORGANIZED HEALTH CARE EDUCATION/TRAINING PROGRAM

## 2024-06-11 PROCEDURE — 99214 OFFICE O/P EST MOD 30 MIN: CPT | Mod: HCNC,S$GLB,, | Performed by: STUDENT IN AN ORGANIZED HEALTH CARE EDUCATION/TRAINING PROGRAM

## 2024-06-11 PROCEDURE — 1125F AMNT PAIN NOTED PAIN PRSNT: CPT | Mod: HCNC,CPTII,S$GLB, | Performed by: STUDENT IN AN ORGANIZED HEALTH CARE EDUCATION/TRAINING PROGRAM

## 2024-06-11 PROCEDURE — 1160F RVW MEDS BY RX/DR IN RCRD: CPT | Mod: HCNC,CPTII,S$GLB, | Performed by: STUDENT IN AN ORGANIZED HEALTH CARE EDUCATION/TRAINING PROGRAM

## 2024-06-11 PROCEDURE — 3072F LOW RISK FOR RETINOPATHY: CPT | Mod: HCNC,CPTII,S$GLB, | Performed by: STUDENT IN AN ORGANIZED HEALTH CARE EDUCATION/TRAINING PROGRAM

## 2024-06-11 PROCEDURE — 1159F MED LIST DOCD IN RCRD: CPT | Mod: HCNC,CPTII,S$GLB, | Performed by: STUDENT IN AN ORGANIZED HEALTH CARE EDUCATION/TRAINING PROGRAM

## 2024-06-11 PROCEDURE — G2211 COMPLEX E/M VISIT ADD ON: HCPCS | Mod: HCNC,S$GLB,, | Performed by: STUDENT IN AN ORGANIZED HEALTH CARE EDUCATION/TRAINING PROGRAM

## 2024-06-11 PROCEDURE — 1101F PT FALLS ASSESS-DOCD LE1/YR: CPT | Mod: HCNC,CPTII,S$GLB, | Performed by: STUDENT IN AN ORGANIZED HEALTH CARE EDUCATION/TRAINING PROGRAM

## 2024-06-11 PROCEDURE — 1126F AMNT PAIN NOTED NONE PRSNT: CPT | Mod: HCNC,CPTII,S$GLB, | Performed by: STUDENT IN AN ORGANIZED HEALTH CARE EDUCATION/TRAINING PROGRAM

## 2024-06-11 PROCEDURE — 17003 DESTRUCT PREMALG LES 2-14: CPT | Mod: HCNC,S$GLB,, | Performed by: STUDENT IN AN ORGANIZED HEALTH CARE EDUCATION/TRAINING PROGRAM

## 2024-06-11 PROCEDURE — 99213 OFFICE O/P EST LOW 20 MIN: CPT | Mod: 25,HCNC,S$GLB, | Performed by: STUDENT IN AN ORGANIZED HEALTH CARE EDUCATION/TRAINING PROGRAM

## 2024-06-11 RX ORDER — INSULIN PUMP SYRINGE, 3 ML
EACH MISCELLANEOUS
Qty: 1 EACH | Refills: 0 | Status: SHIPPED | OUTPATIENT
Start: 2024-06-11

## 2024-06-11 RX ORDER — LANCETS
EACH MISCELLANEOUS
Qty: 100 EACH | Refills: 3 | Status: SHIPPED | OUTPATIENT
Start: 2024-06-11

## 2024-06-11 NOTE — PROGRESS NOTES
Patient Information  Name: Shireen Felix  : 1945  MRN: [id number]     Referring Physician:  Dr. Jordan   Primary Care Physician:  Dr. Chaudhari, Laron Skinner MD   Date of Visit: 2024      Subjective:       Shireen Felix is a 78 y.o. female who presents for   Chief Complaint   Patient presents with    Spot     C/o spots on face and ears      HPI  Hx of shingles of left VI on face, here with complaint of pain on the left VI distribution. She has tried gabapentin and all sorts of creams without improvement.     Patient is here with concern of: skin lesion  Location: right ear  Duration: years  Symptoms: scaly  Prior treatments: none    Patient was last seen:Visit date not found     Prior notes by myself reviewed.   Clinical documentation obtained by nursing staff reviewed.    Review of Systems   Skin:  Negative for itching and rash.        Objective:    Physical Exam   Constitutional: She appears well-developed and well-nourished. No distress.   Neurological: She is alert and oriented to person, place, and time. She is not disoriented.   Psychiatric: She has a normal mood and affect.   Skin:   Areas Examined (abnormalities noted in diagram):   Head / Face Inspection Performed              Diagram Legend     Erythematous scaling macule/papule c/w actinic keratosis       Vascular papule c/w angioma      Pigmented verrucoid papule/plaque c/w seborrheic keratosis      Yellow umbilicated papule c/w sebaceous hyperplasia      Irregularly shaped tan macule c/w lentigo     1-2 mm smooth white papules consistent with Milia      Movable subcutaneous cyst with punctum c/w epidermal inclusion cyst      Subcutaneous movable cyst c/w pilar cyst      Firm pink to brown papule c/w dermatofibroma      Pedunculated fleshy papule(s) c/w skin tag(s)      Evenly pigmented macule c/w junctional nevus     Mildly variegated pigmented, slightly irregular-bordered macule c/w mildly atypical nevus      Flesh colored to  evenly pigmented papule c/w intradermal nevus       Pink pearly papule/plaque c/w basal cell carcinoma      Erythematous hyperkeratotic cursted plaque c/w SCC      Surgical scar with no sign of skin cancer recurrence      Open and closed comedones      Inflammatory papules and pustules      Verrucoid papule consistent consistent with wart     Erythematous eczematous patches and plaques     Dystrophic onycholytic nail with subungual debris c/w onychomycosis     Umbilicated papule    Erythematous-base heme-crusted tan verrucoid plaque consistent with inflamed seborrheic keratosis     Erythematous Silvery Scaling Plaque c/w Psoriasis     See annotation      No images are attached to the encounter or orders placed in the encounter.    [] Data reviewed  [] Independent review of test  [] Management discussed with another provider    Assessment / Plan:        Postherpetic neuralgia  - Recommends seeing pain specialist for consideration of nerve block    Actinic keratoses  Cryosurgery Procedure Note    Verbal consent from the patient is obtained including, but not limited to, risk of hypopigmentation/hyperpigmentation, scar, recurrence of lesion. The patient is aware of the precancerous quality and need for treatment of these lesions. Liquid nitrogen cryosurgery is applied to the 2 actinic keratoses, as detailed in the physical exam, to produce a freeze injury. The patient is aware that blisters may form and is instructed on wound care with gentle cleansing and use of vaseline ointment to keep moist until healed. The patient is supplied a handout on cryosurgery and is instructed to call if lesions do not completely resolve.             LOS NUMBER AND COMPLEXITY OF PROBLEMS    COMPLEXITY OF DATA RISK TOTAL TIME (m)   66615  48188 [] 1 self-limited or minor problem [x] Minimal to none [] No treatment recommended or patient to monitor 15-29  10-19   56930  81255 Low  [] 2 or > self limited or minor problems  [] 1 stable  chronic illness  [] 1 acute, uncomplicated illness or injury Limited (2)  [] Prior external notes from each unique source  [] Review result of each unique test  [] Order each unique test [x]  Low  OTC medications, minor skin biopsy 30-44  20-29   04365  40600 Moderate  [x]  1 or > chronic illness with progression, exacerbation or SE of treatment  []  2 or more stable chronic illnesses  []  1 acute illness with systemic symptoms  []  1 acute complicated injury  []  1 undiagnosed new problem with uncertain prognosis Moderate (1/3 below)  []  3 or more data items        *Now includes assessment requiring independent historian  []  Independent interpretation of a test  []  Discuss management/test with another provider Moderate  []  Prescription drug mgmt  []  Minor surgery with risk discussed  []  Mgmt limited by social determinates 45-59  30-39   95573  56089 High  []  1 or more chronic illness with severe exacerbation, progression or SE of treatment  []  1 acute or chronic illness/injury that poses a threat to life or bodily function Extensive (2/3 below)  []  3 or more data items        *Now includes assessment requiring independent historian.  []  Independent interpretation of a test  []  Discuss management/test with another provider High  []  Major surgery with risk discussed  []  Drug therapy requiring intensive monitoring for toxicity  []  Hospitalization  []  Decision for DNR 60-74  40-54      No follow-ups on file.    Pham Erickson MD, FAAD  [facility]

## 2024-06-11 NOTE — ASSESSMENT & PLAN NOTE
- Improved since prior appointment   - Continue current medications   - Educated on results of blood testing   - Will continue to monitor and reassess

## 2024-06-11 NOTE — ASSESSMENT & PLAN NOTE
- Interval improvement since prior appointment   - Continue Tezspire, Trelegy 200 mcg, and Singular   - Educated on proper use of inhalers including an in-person demonstration of proper technique  - Expressed understanding of demonstrated technique  - ED precautions discussed at length   - Will continue to monitor and reassess

## 2024-06-11 NOTE — PROGRESS NOTES
Allergy and Immunology  Established Patient Clinic Note    Date: 6/11/2024  Chief Complaint   Patient presents with    Follow-up     History  Shireen Felix is a 78 y.o. female being seen for follow-up today.    Chronic Non-Allergic Rhinitis   - Patient with an interval improvement reported since prior appointment      Severe Persistent Asthma/COPD  Pulmonary Fibrosis     - Overall improvement in symptoms and QOL since starting Tezspire   - Tift noting patient's overall improvement is due to multidisciplinary interventions  - Patient to continue inhalers and Tezspire at this time     Prior HPI:   - Onset: Asthma appears to have began in childhood   - Hx of smoking with COPD and also pulmonary fibrosis   - Multifactorial disease affecting quality of life   - Patient with multiple admissions and oral steroids for resp exacerbation   - Not controled despite inhalers and escalation to biologics indicated  - Eosinophils zero but on steroids      Drug Allergy  - PCN/Bactrim to be discussed at next appointment  - Planning for potential oral challenge after discussion of risk v benefits     Allergies, PMH, PSH, Social, and Family History were reviewed.    Current Outpatient Medications on File Prior to Visit   Medication Sig Dispense Refill    albuterol (PROVENTIL) 2.5 mg /3 mL (0.083 %) nebulizer solution EMPTY 1 VIAL INTO NEBULIZER EVERY 4 HOURS AS NEEDED FRO RESCUE 180 mL 11    albuterol (PROVENTIL/VENTOLIN HFA) 90 mcg/actuation inhaler INHALE TWO PUFFS INTO THE LUNGS EVERY 4 HOURS AS NEEDED 18 g 11    albuterol-budesonide (AIRSUPRA) 90-80 mcg/actuation Inhale 2 puffs into the lungs every 4 (four) hours as needed (wheezing, cough). 10.7 g 11    APTIOM 400 mg Tab tablet Take 400 mg by mouth.      atorvastatin (LIPITOR) 40 MG tablet TAKE 1 TABLET BY MOUTH ONCE DAILY 90 tablet 1    bisoprolol (ZEBETA) 5 MG tablet Take 1 tablet (5 mg total) by mouth once daily. 30 tablet 11    blood sugar diagnostic Strp To check BG 1  times daily, to use with insurance preferred meter 100 strip 3    blood-glucose meter kit To check BG 1 times daily, to use with insurance preferred meter 1 each 0    cholecalciferol, vitamin D3, 1,250 mcg (50,000 unit) capsule Take 1 capsule (50,000 Units total) by mouth every 7 days. 12 capsule 0    clopidogreL (PLAVIX) 75 mg tablet Take 1 tablet (75 mg total) by mouth once daily. 90 tablet 3    denosumab (PROLIA) 60 mg/mL Syrg every 6 (six) months.      dorzolamide-timolol 2-0.5% (COSOPT) 22.3-6.8 mg/mL ophthalmic solution Place 1 drop into both eyes every 12 (twelve) hours. 10 mL 12    empagliflozin (JARDIANCE) 10 mg tablet Take 1 tablet (10 mg total) by mouth once daily. 90 tablet 3    EScitalopram oxalate (LEXAPRO) 20 MG tablet TAKE 1 TABLET BY MOUTH ONCE DAILY 90 tablet 2    fluconazole (DIFLUCAN) 150 MG Tab Take 1 tablet (150 mg total) by mouth once daily. 2 tablet 0    fluocinonide (LIDEX) 0.05 % external solution Apply topically 2 (two) times daily. Use on itchy spots on scalp and ear 60 mL 2    fluticasone propionate (FLONASE) 50 mcg/actuation nasal spray USE 2 SPRAYS INTO EACH NOSTRIL ONCE A DAY AT 6AM AS DIRECTED 16 g 11    fluticasone-umeclidin-vilanter (TRELEGY ELLIPTA) 200-62.5-25 mcg inhaler INHALE 1 PUFF INTO THE LUNGS DAILY 60 each 11    furosemide (LASIX) 20 MG tablet Take 1 tablet (20 mg total) by mouth daily as needed (for edema, swelling, fluid build up, or 3 pound weight gain in 24 hours). 30 tablet 11    gabapentin (NEURONTIN) 300 MG capsule Take 1 capsule (300 mg total) by mouth 3 (three) times daily. 90 capsule 1    HYDROcodone-acetaminophen (NORCO) 5-325 mg per tablet Take 1 tablet by mouth every 6 (six) hours as needed for Pain.      hydrOXYzine HCL (ATARAX) 25 MG tablet TAKE 1 TABLET BY MOUTH EVERY EVENING 30 tablet 2    ibuprofen (ADVIL,MOTRIN) 800 MG tablet Take 1 tablet (800 mg total) by mouth 3 (three) times daily. 60 tablet 0    ipratropium (ATROVENT) 42 mcg (0.06 %) nasal spray  USE 2 SPRAYS INTO EACH NOSTRIL FOUR TIMES DAILY 15 mL 11    isosorbide mononitrate (IMDUR) 120 MG 24 hr tablet Take 1 tablet (120 mg total) by mouth every evening. 90 tablet 3    ketoconazole (NIZORAL) 2 % cream Apply topically 2 (two) times daily. For dry flaky patches of ear. 30 g 1    lancets [facility] To check BG 1 times daily, to use with insurance preferred meter 100 each 3    latanoprost 0.005 % ophthalmic solution Place 1 drop into both eyes every evening. 2.5 mL 12    levETIRAcetam (KEPPRA) 750 MG Tab Take 1 tablet (750 mg total) by mouth 2 (two) times daily. 60 tablet 11    linezolid (ZYVOX) 600 mg Tab Take 1 tablet (600 mg total) by mouth every 12 (twelve) hours. 14 tablet 0    magnesium oxide (MAG-OX) 400 mg (241.3 mg magnesium) tablet Take 1 tablet (400 mg total) by mouth once daily. 90 tablet 1    montelukast (SINGULAIR) 10 mg tablet Take 1 tablet (10 mg total) by mouth once daily. 90 tablet 3    montelukast (SINGULAIR) 10 mg tablet Take 1 tablet (10 mg total) by mouth once daily. 90 tablet 3    mucus clearing device (AEROBIKA OSCILLATING PEP SYSTM) by Misc.(Non-Drug; Combo Route) route 4 (four) times daily. 1 each 0    nitroGLYCERIN (NITROSTAT) 0.4 MG SL tablet Place 1 tablet (0.4 mg total) under the tongue every 5 (five) minutes as needed for Chest pain. 100 tablet 0    omeprazole (PRILOSEC) 40 MG capsule Take 40 mg by mouth every morning.      ondansetron (ZOFRAN-ODT) 4 MG TbDL Take 1 tablet (4 mg total) by mouth every 6 (six) hours as needed. 30 tablet 0    pantoprazole (PROTONIX) 40 MG tablet Take 40 mg by mouth 2 (two) times daily.      potassium chloride SA (K-DUR,KLOR-CON) 20 MEQ tablet Take 1 tablet (20 mEq total) by mouth once daily. Take extra dose with Lasix - fluid pill 90 tablet 1    predniSONE (DELTASONE) 10 MG tablet Take 1 tablet (10 mg total) by mouth once daily. 30 tablet 0    ranolazine (RANEXA) 1,000 mg Tb12 Take 1 tablet (1,000 mg total) by mouth 2 (two) times daily. 180 tablet 3     roflumilast (DALIRESP) 500 mcg Tab TAKE ONE TABLET BY MOUTH DAILY 90 tablet 3    sacubitriL-valsartan (ENTRESTO) 24-26 mg per tablet Take 1 tablet by mouth 2 (two) times daily. 180 tablet 3    sodium chloride 3% 3 % nebulizer solution Take 4 mLs by nebulization as needed for Cough (chest congestion). 360 mL 11    spironolactone (ALDACTONE) 50 MG tablet Take 1 tablet (50 mg total) by mouth once daily. 90 tablet 1    tezepelumab-ekko 210 mg/1.91 mL (110 mg/mL) PnIj Inject 210 mg into the skin every 28 days. 1.91 mL 11    tiZANidine (ZANAFLEX) 4 MG tablet Take 4 mg by mouth every 8 (eight) hours.      triamcinolone acetonide 0.025% (KENALOG) 0.025 % cream Apply topically 2 (two) times daily. PRN rash and itching of ear. Mild steroid cream. 30 g 0    cholestyramine (QUESTRAN) 4 gram packet TAKE 1 PACKET BY MOUTH TWICE DAILY AS DIRECTED (Patient not taking: Reported on 5/23/2024) 60 packet 11    dicyclomine (BENTYL) 10 MG capsule Take 1 capsule (10 mg total) by mouth 2 (two) times daily. (Patient not taking: Reported on 5/23/2024) 10 capsule 0    MYRBETRIQ 50 mg Tb24 TAKE 1 TABLET BY MOUTH ONCE DAILY (Patient not taking: Reported on 5/8/2024) 30 tablet 0     No current facility-administered medications on file prior to visit.     Physical Examination  Vitals:    06/11/24 1040   BP: (!) 107/59   Pulse: 68   Temp: 97.7 °F (36.5 °C)     GENERAL:  female in no apparent distress and well developed and well nourished  HEAD:  Normocephalic, without obvious abnormality, atraumatic  EYES: sclera anicteric, conjunctiva normochromic  EARS: normal TM's and external ear canals both ears  NOSE: without erythema or discharge, clear discharge, turbinates normal    OROPHARYNX: moist mucous membranes without erythema, exudates or petechiae  LYMPH NODES: normal, supple, no lymphadenopathy  LUNGS: Velcro sounds - taint noted. No wheezing.  HEART: normal rate, regular rhythm, normal S1, S2, no murmurs, rubs, clicks or gallops.  ABDOMEN:  soft, nontender, nondistended, no masses or organomegaly.  MUSCULOSKELETAL: no gross joint deformity or swelling.  NEURO: alert, oriented, normal speech, no focal findings or movement disorder noted.  SKIN: normal coloration and turgor, no rashes, no suspicious skin lesions noted.     Assessment/Plan:   Problem List Items Addressed This Visit          ENT    Chronic nonallergic rhinitis    Overview     - 04/29/2024: Serum IgE to Zone 6 Aeroallergens negative          Current Assessment & Plan     - Improved since prior appointment   - Continue current medications   - Educated on results of blood testing   - Will continue to monitor and reassess             Pulmonary    Severe persistent asthma without complication - Primary    Current Assessment & Plan     - Interval improvement since prior appointment   - Continue Tezspire, Trelegy 200 mcg, and Singular   - Educated on proper use of inhalers including an in-person demonstration of proper technique  - Expressed understanding of demonstrated technique  - ED precautions discussed at length   - Will continue to monitor and reassess            Other    Drug reaction    Overview     - PCN/Bactrim to be discussed at next appointment  - Planning for potential oral challenge after discussion of risk v benefits           Follow up:  Follow up in about 3 months (around 9/11/2024).    Visit today included increased complexity associated with the care of the episodic problem asthma addressed and managing the longitudinal care of the patient due to the serious and/or complex managed problem(s) severe persistent asthma, pulmonary fibrosis, COPD and chronic nonallergic rhinitis .    Kesler Bourgoyne, MD [facility]  Allergy and Immunology

## 2024-06-11 NOTE — PATIENT INSTRUCTIONS

## 2024-06-18 LAB
OHS CV HOLTER SINUS AVERAGE HR: 73
OHS CV HOLTER SINUS MAX HR: 106
OHS CV HOLTER SINUS MIN HR: 62

## 2024-06-24 DIAGNOSIS — L29.9 PRURITUS: ICD-10-CM

## 2024-06-25 ENCOUNTER — TELEPHONE (OUTPATIENT)
Dept: ALLERGY | Facility: CLINIC | Age: 79
End: 2024-06-25
Payer: MEDICARE

## 2024-06-25 RX ORDER — HYDROXYZINE HYDROCHLORIDE 25 MG/1
25 TABLET, FILM COATED ORAL NIGHTLY
Qty: 30 TABLET | Refills: 2 | Status: SHIPPED | OUTPATIENT
Start: 2024-06-25

## 2024-06-25 NOTE — TELEPHONE ENCOUNTER
Patient said Tespire malfunction and went all over her floor. Will call [facility] pharmacy in the morning to discuss resending.      ----- Message from Karri Mosley sent at 6/25/2024  4:42 PM CDT -----  Contact: Isidro Iyer called about some complications that she is having with the medications. Call Back is [phone] She stated that the shot that was delivered it exploded

## 2024-06-26 ENCOUNTER — TELEPHONE (OUTPATIENT)
Dept: ALLERGY | Facility: CLINIC | Age: 79
End: 2024-06-26
Payer: MEDICARE

## 2024-06-26 NOTE — TELEPHONE ENCOUNTER
Spoke with Samantha in [facility] . Patient was given the  number to call to have Tespire replaced since it malfuctioned.

## 2024-06-27 ENCOUNTER — TELEPHONE (OUTPATIENT)
Dept: ALLERGY | Facility: CLINIC | Age: 79
End: 2024-06-27
Payer: MEDICARE

## 2024-06-27 NOTE — TELEPHONE ENCOUNTER
[employer] Product Replacement Prescription Verfication Form has been faxed to [phone] with confirmation received . Scanned to media

## 2024-07-01 RX ORDER — OMEPRAZOLE 40 MG/1
40 CAPSULE, DELAYED RELEASE ORAL EVERY MORNING
Qty: 90 CAPSULE | Refills: 3 | Status: SHIPPED | OUTPATIENT
Start: 2024-07-01

## 2024-07-01 NOTE — TELEPHONE ENCOUNTER
----- Message from Brody Kellogg sent at 7/1/2024 12:03 PM CDT -----  Regarding: Refill request  Type:  RX Refill Request    Who Called: Pt  Refill or New Rx:refill    RX Name and Strength:omeprazole (PRILOSEC) 40 MG capsule   How is the patient currently taking it? (ex. 1XDay): 1xday  Is this a 30 day or 90 day RX:90    Preferred Pharmacy with phone number:  [facility] [place] - [address] [address]  [address] [address] [address]  Phone: [phone] Fax: [phone]      Local or Mail Order:local  Ordering Provider:Watson    Would the patient rather a call back or a response via [facility]? Call back    Best Call Back Number:[phone]     Additional Information: Sts the pharmacy sent a fax last week and she hasn't heard anything back.     Please advise -- Thank you

## 2024-07-01 NOTE — TELEPHONE ENCOUNTER
No care due was identified.  [facility] Embedded Care Due Messages. Reference number: [id number].   7/01/2024 2:48:38 PM CDT

## 2024-07-01 NOTE — TELEPHONE ENCOUNTER
Refill Routing Note   Medication(s) are not appropriate for processing by [facility] for the following reason(s):        No active prescription written by provider    [facility] action(s):  Defer        Medication Therapy Plan: Historical Medication, medication not active.      Appointments  past 12m or future 3m with PCP    Date Provider   Last Visit   5/2/2024 Laron Chaudhari MD   Next Visit   7/2/2024 Laron Chaudhari MD   ED visits in past 90 days: 0        Note composed:2:56 PM 07/01/2024

## 2024-07-02 PROCEDURE — G0179 MD RECERTIFICATION HHA PT: HCPCS | Mod: ,,, | Performed by: PEDIATRICS

## 2024-07-03 DIAGNOSIS — E55.9 VITAMIN D INSUFFICIENCY: ICD-10-CM

## 2024-07-03 RX ORDER — ASPIRIN 325 MG
50000 TABLET, DELAYED RELEASE (ENTERIC COATED) ORAL
Qty: 12 CAPSULE | Refills: 0 | Status: SHIPPED | OUTPATIENT
Start: 2024-07-03

## 2024-07-11 ENCOUNTER — TELEPHONE (OUTPATIENT)
Dept: NEUROLOGY | Facility: CLINIC | Age: 79
End: 2024-07-11
Payer: MEDICARE

## 2024-07-11 ENCOUNTER — TELEPHONE (OUTPATIENT)
Dept: OPHTHALMOLOGY | Facility: CLINIC | Age: 79
End: 2024-07-11
Payer: MEDICARE

## 2024-07-11 ENCOUNTER — TELEPHONE (OUTPATIENT)
Dept: INTERNAL MEDICINE | Facility: CLINIC | Age: 79
End: 2024-07-11
Payer: MEDICARE

## 2024-07-11 ENCOUNTER — TELEPHONE (OUTPATIENT)
Dept: PRIMARY CARE CLINIC | Facility: CLINIC | Age: 79
End: 2024-07-11
Payer: MEDICARE

## 2024-07-11 NOTE — TELEPHONE ENCOUNTER
Set up an appt tomorrow for possible iritis with some pain when moving her eye and light sensitivity. Appt with Dr. Cano on July 12th at 11am.  ----- Message from Oneida Lofton sent at 7/11/2024  2:04 PM CDT -----  Type:  Needs Medical Advice     Who Called: .Shireen Felix   Symptoms (please be specific): blood in eyes   How long has patient had these symptoms:    Pharmacy name and phone #:  .   [place] - [address] [address]   [address] [address] [address]   Phone: [phone] Fax: [phone]   Would the patient rather a call back or a response via [facility]? Call back   Best Call Back Number: [phone]  pt would like to get advised what to do or use for blood in eye   Additional Information:

## 2024-07-11 NOTE — TELEPHONE ENCOUNTER
----- Message from Neymar House sent at 7/11/2024  1:56 PM CDT -----  Contact: brother  ..Type:  Same Day Appointment Request    Caller is requesting a same day appointment.  Caller declined first available appointment listed below.    Name of Caller:Dahlia Felix  When is the first available appointment?  Symptoms:blood in eyes   Best Call Back Number:[phone]  Additional Information: MRN: [id number] Dr. Chaudhari or [facility]     Symptom: Eye Redness Without Pus or Discharge  Outcome: Schedule an appointment to be seen within 24 hours.  Reason: Caller denied all higher acuity questions    The caller accepted this outcome

## 2024-07-11 NOTE — TELEPHONE ENCOUNTER
----- Message from Leonciokeith Verdin sent at 7/11/2024  1:33 PM CDT -----  Contact: [phone]  Type:  Needs Medical Advice    Who Called: Jetty   Symptoms (please be specific): Eye are blood red    How long has patient had these symptoms:  7/11  Pharmacy name and phone #:    [facility] [place] - [address] [address]  [address] [address]  [place] [address]  Phone: [phone] Fax: [phone]  Would the patient rather a call back or a response via [facility]? Call Back   Best Call Back Number: [phone]   Additional Information: pt is needing to know whether she needs to go to the ER or what to do.      Thanks KB

## 2024-07-11 NOTE — TELEPHONE ENCOUNTER
Patient brother would like a call back regarding to patients eyes being bloody red. I avised them from our standpoint we recommend bringing her to ED. Patient brother would like a call from Dr.Sledge jeter. Rm at [phone]

## 2024-07-11 NOTE — TELEPHONE ENCOUNTER
Pts brother notified that Mrs. Rodriguez was booked today and advised them to bring Mrs. Felix to the ER to be evaluated. States the eye are not blood shot like they are irritated but look completely bloody. Caller verbalized understanding.

## 2024-07-11 NOTE — TELEPHONE ENCOUNTER
----- Message from Suzan Veras sent at 6/10/2024  2:09 PM CDT -----  Contact: Lisa Giraldo  Type:  Diabetic/Medical Supplies Request    Name of Caller:  Lisa Giraldo   What supplies are needed:  Needs new Glucometer (accu check & True Metrix are Approved) & supplies  What is the brand of the supplies:  Accu Check or True Metrix approved by [employer]  Refill or New Rx:  New Rx  If checking glucose, how many times do they check it?:  Once daily  Who prescribed the original supplies:  Dr Chaudhari  Pharmacy/Company Name, Phone #, Location:    [facility] [address]  [address] [facility] [address]  Phone: [phone] Fax: [phone]  Requesting a Call Back:  No   Best Call Back Number:  [phone]  Additional Information:  Pt called and let them know hers was not working and needs new order for the machine and the supplies. Thank You

## 2024-07-12 ENCOUNTER — TELEPHONE (OUTPATIENT)
Dept: INTERNAL MEDICINE | Facility: CLINIC | Age: 79
End: 2024-07-12
Payer: MEDICARE

## 2024-07-12 ENCOUNTER — LAB VISIT (OUTPATIENT)
Dept: LAB | Facility: HOSPITAL | Age: 79
End: 2024-07-12
Attending: PEDIATRICS
Payer: MEDICARE

## 2024-07-12 ENCOUNTER — OFFICE VISIT (OUTPATIENT)
Dept: OPHTHALMOLOGY | Facility: CLINIC | Age: 79
End: 2024-07-12
Payer: MEDICARE

## 2024-07-12 ENCOUNTER — TELEPHONE (OUTPATIENT)
Dept: CARDIOLOGY | Facility: CLINIC | Age: 79
End: 2024-07-12
Payer: MEDICARE

## 2024-07-12 DIAGNOSIS — E11.22 CONTROLLED TYPE 2 DIABETES MELLITUS WITH STAGE 3 CHRONIC KIDNEY DISEASE, WITHOUT LONG-TERM CURRENT USE OF INSULIN: Primary | ICD-10-CM

## 2024-07-12 DIAGNOSIS — H40.1132 PRIMARY OPEN ANGLE GLAUCOMA (POAG) OF BOTH EYES, MODERATE STAGE: ICD-10-CM

## 2024-07-12 DIAGNOSIS — H04.129 DRY EYE: ICD-10-CM

## 2024-07-12 DIAGNOSIS — E11.22 CONTROLLED TYPE 2 DIABETES MELLITUS WITH STAGE 3 CHRONIC KIDNEY DISEASE, WITHOUT LONG-TERM CURRENT USE OF INSULIN: ICD-10-CM

## 2024-07-12 DIAGNOSIS — N18.30 CONTROLLED TYPE 2 DIABETES MELLITUS WITH STAGE 3 CHRONIC KIDNEY DISEASE, WITHOUT LONG-TERM CURRENT USE OF INSULIN: ICD-10-CM

## 2024-07-12 DIAGNOSIS — H11.31 SUBCONJUNCTIVAL HEMORRHAGE, RIGHT: Primary | ICD-10-CM

## 2024-07-12 DIAGNOSIS — N18.30 CONTROLLED TYPE 2 DIABETES MELLITUS WITH STAGE 3 CHRONIC KIDNEY DISEASE, WITHOUT LONG-TERM CURRENT USE OF INSULIN: Primary | ICD-10-CM

## 2024-07-12 PROCEDURE — 36415 COLL VENOUS BLD VENIPUNCTURE: CPT | Mod: HCNC | Performed by: PEDIATRICS

## 2024-07-12 PROCEDURE — 80061 LIPID PANEL: CPT | Mod: HCNC | Performed by: PEDIATRICS

## 2024-07-12 PROCEDURE — 99999 PR PBB SHADOW E&M-EST. PATIENT-LVL III: CPT | Mod: PBBFAC,HCNC,, | Performed by: OPHTHALMOLOGY

## 2024-07-12 PROCEDURE — 85025 COMPLETE CBC W/AUTO DIFF WBC: CPT | Mod: HCNC | Performed by: PEDIATRICS

## 2024-07-12 PROCEDURE — 83036 HEMOGLOBIN GLYCOSYLATED A1C: CPT | Mod: HCNC | Performed by: PEDIATRICS

## 2024-07-12 PROCEDURE — 80053 COMPREHEN METABOLIC PANEL: CPT | Mod: HCNC | Performed by: PEDIATRICS

## 2024-07-12 NOTE — TELEPHONE ENCOUNTER
----- Message from Felipa Nash sent at 7/12/2024 10:35 AM CDT -----  Contact: Shireen  .Patient is calling to speak with the nurse regarding call back . Reports wanting to speak to someone about lab orders and would like to speak with nurse  . Please give patient a call back at   [phone]

## 2024-07-12 NOTE — PROGRESS NOTES
HPI     Eye Pain     Additional comments: Pt is here for red and pain in the OD. Pt states her   eye became red later yesterday afternoon. Pt states her OD hurts when she   looks around and outer side of eye lid.            Comments    Likes ONL early    Previous DNL PT  PCP Dr. Chaudhari    1. Mod COAG goal = 18-19 +Fhx coag-mother   Pt states she has had elevated pressures in the past.   2. 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 ERM OD  CR scar OD  4. DM-2005  5. PCIOL OU? -done in [place]  6. Dry Eyes  7. Refractive error PAL Rx    Latanoprost ou qhs  Dorzolamide/Timolol BID OU    AT's prn OU             Last edited by Nica Shelby on 7/12/2024 11:21 AM.            Assessment /Plan     For exam results, see Encounter Report.    Subconjunctival hemorrhage, right    Hx of HTN. Denies trauma. Currently on blood thinners. Ocular exam WNL otherwise.   Avoid any blood thinners that are not medically necessary  ATs as needed for comfort  Informed patient that this will self-resolve in a few weeks     Primary open angle glaucoma (POAG) of both eyes, moderate stage  Doing well, intraocular pressure (IOP) within acceptable range relative to target IOP with no evidence of progression. Continue current treatment. Reviewed importance of continued compliance with treatment and follow up.      Continue current gtts:  Latanoprost one drop in each eye nightly and Dorzolamide/Timolol (Cosopt) one drop in each eye every 12 hours    Dry eye  Patient with symptoms and exam consistent with dry eye. Start artificial tears 3-4 times daily with warm compresses. If no improvement after 4 weeks of treatment, return to clinic to discuss further interventions.      Return to clinic in 1 month with IOP check, GOCT, MRx, and dry eye check or sooner PRN

## 2024-07-12 NOTE — TELEPHONE ENCOUNTER
Contacted patient; She stated that she needs a full panel of all blood work; I advise patient to reach out to her primary care provider for labs; Patient stated she's on her way to the [facility] too speak with primary care provider for labs.       Patient had no further questions or concerns.         ----- Message from Evette Rolandgary sent at 7/12/2024  8:32 AM CDT -----  Contact: pt  Pt is calling in rgd to needing the nurse to call her about her symptoms and other issues.  Also, need ting to get orders for blood work, she will be at [facility] for another appt.  Please call her back at  [phone]  thanks/mpd

## 2024-07-13 LAB
ALBUMIN SERPL BCP-MCNC: 3.4 G/DL (ref 3.5–5.2)
ALP SERPL-CCNC: 59 U/L (ref 55–135)
ALT SERPL W/O P-5'-P-CCNC: 9 U/L (ref 10–44)
ANION GAP SERPL CALC-SCNC: 9 MMOL/L (ref 8–16)
AST SERPL-CCNC: 15 U/L (ref 10–40)
BASOPHILS # BLD AUTO: 0.02 K/UL (ref 0–0.2)
BASOPHILS NFR BLD: 0.3 % (ref 0–1.9)
BILIRUB SERPL-MCNC: 0.3 MG/DL (ref 0.1–1)
BUN SERPL-MCNC: 19 MG/DL (ref 8–23)
CALCIUM SERPL-MCNC: 8.7 MG/DL (ref 8.7–10.5)
CHLORIDE SERPL-SCNC: 108 MMOL/L (ref 95–110)
CHOLEST SERPL-MCNC: 112 MG/DL (ref 120–199)
CHOLEST/HDLC SERPL: 2.7 {RATIO} (ref 2–5)
CO2 SERPL-SCNC: 17 MMOL/L (ref 23–29)
CREAT SERPL-MCNC: 1.2 MG/DL (ref 0.5–1.4)
DIFFERENTIAL METHOD BLD: ABNORMAL
EOSINOPHIL # BLD AUTO: 0.1 K/UL (ref 0–0.5)
EOSINOPHIL NFR BLD: 1.4 % (ref 0–8)
ERYTHROCYTE [DISTWIDTH] IN BLOOD BY AUTOMATED COUNT: 15 % (ref 11.5–14.5)
EST. GFR  (NO RACE VARIABLE): 46 ML/MIN/1.73 M^2
ESTIMATED AVG GLUCOSE: 94 MG/DL (ref 68–131)
GLUCOSE SERPL-MCNC: 100 MG/DL (ref 70–110)
HBA1C MFR BLD: 4.9 % (ref 4–5.6)
HCT VFR BLD AUTO: 39.8 % (ref 37–48.5)
HDLC SERPL-MCNC: 41 MG/DL (ref 40–75)
HDLC SERPL: 36.6 % (ref 20–50)
HGB BLD-MCNC: 11.8 G/DL (ref 12–16)
IMM GRANULOCYTES # BLD AUTO: 0.03 K/UL (ref 0–0.04)
IMM GRANULOCYTES NFR BLD AUTO: 0.4 % (ref 0–0.5)
LDLC SERPL CALC-MCNC: 49.8 MG/DL (ref 63–159)
LYMPHOCYTES # BLD AUTO: 2.1 K/UL (ref 1–4.8)
LYMPHOCYTES NFR BLD: 26.7 % (ref 18–48)
MCH RBC QN AUTO: 29.4 PG (ref 27–31)
MCHC RBC AUTO-ENTMCNC: 29.6 G/DL (ref 32–36)
MCV RBC AUTO: 99 FL (ref 82–98)
MONOCYTES # BLD AUTO: 0.7 K/UL (ref 0.3–1)
MONOCYTES NFR BLD: 9.4 % (ref 4–15)
NEUTROPHILS # BLD AUTO: 4.8 K/UL (ref 1.8–7.7)
NEUTROPHILS NFR BLD: 61.8 % (ref 38–73)
NONHDLC SERPL-MCNC: 71 MG/DL
NRBC BLD-RTO: 0 /100 WBC
PLATELET # BLD AUTO: 241 K/UL (ref 150–450)
PMV BLD AUTO: 10.6 FL (ref 9.2–12.9)
POTASSIUM SERPL-SCNC: 3.7 MMOL/L (ref 3.5–5.1)
PROT SERPL-MCNC: 7.1 G/DL (ref 6–8.4)
RBC # BLD AUTO: 4.02 M/UL (ref 4–5.4)
SODIUM SERPL-SCNC: 134 MMOL/L (ref 136–145)
TRIGL SERPL-MCNC: 106 MG/DL (ref 30–150)
WBC # BLD AUTO: 7.78 K/UL (ref 3.9–12.7)

## 2024-07-15 ENCOUNTER — TELEPHONE (OUTPATIENT)
Dept: PAIN MEDICINE | Facility: CLINIC | Age: 79
End: 2024-07-15
Payer: MEDICARE

## 2024-07-17 ENCOUNTER — TELEPHONE (OUTPATIENT)
Dept: INTERNAL MEDICINE | Facility: CLINIC | Age: 79
End: 2024-07-17

## 2024-07-17 ENCOUNTER — TELEPHONE (OUTPATIENT)
Dept: PAIN MEDICINE | Facility: CLINIC | Age: 79
End: 2024-07-17
Payer: MEDICARE

## 2024-07-17 ENCOUNTER — OFFICE VISIT (OUTPATIENT)
Dept: INTERNAL MEDICINE | Facility: CLINIC | Age: 79
End: 2024-07-17
Payer: MEDICARE

## 2024-07-17 VITALS
SYSTOLIC BLOOD PRESSURE: 100 MMHG | BODY MASS INDEX: 17.5 KG/M2 | HEART RATE: 74 BPM | DIASTOLIC BLOOD PRESSURE: 60 MMHG | TEMPERATURE: 98 F | WEIGHT: 102.5 LBS | HEIGHT: 64 IN | OXYGEN SATURATION: 99 % | RESPIRATION RATE: 20 BRPM

## 2024-07-17 DIAGNOSIS — J30.9 CHRONIC ALLERGIC RHINITIS: ICD-10-CM

## 2024-07-17 DIAGNOSIS — Z86.73 HISTORY OF STROKE: ICD-10-CM

## 2024-07-17 DIAGNOSIS — I50.43 ACUTE ON CHRONIC COMBINED SYSTOLIC AND DIASTOLIC CONGESTIVE HEART FAILURE: ICD-10-CM

## 2024-07-17 DIAGNOSIS — J41.1 MUCOPURULENT CHRONIC BRONCHITIS: Primary | Chronic | ICD-10-CM

## 2024-07-17 DIAGNOSIS — Z98.890 HISTORY OF CEREBRAL ANEURYSM REPAIR: ICD-10-CM

## 2024-07-17 DIAGNOSIS — E11.22 CONTROLLED TYPE 2 DIABETES MELLITUS WITH STAGE 3 CHRONIC KIDNEY DISEASE, WITHOUT LONG-TERM CURRENT USE OF INSULIN: ICD-10-CM

## 2024-07-17 DIAGNOSIS — J41.1 MUCOPURULENT CHRONIC BRONCHITIS: Chronic | ICD-10-CM

## 2024-07-17 DIAGNOSIS — E55.9 VITAMIN D INSUFFICIENCY: ICD-10-CM

## 2024-07-17 DIAGNOSIS — I25.118 CORONARY ARTERY DISEASE OF NATIVE ARTERY OF NATIVE HEART WITH STABLE ANGINA PECTORIS: ICD-10-CM

## 2024-07-17 DIAGNOSIS — K21.9 GASTROESOPHAGEAL REFLUX DISEASE, UNSPECIFIED WHETHER ESOPHAGITIS PRESENT: ICD-10-CM

## 2024-07-17 DIAGNOSIS — R55 SYNCOPE AND COLLAPSE: ICD-10-CM

## 2024-07-17 DIAGNOSIS — N18.30 CONTROLLED TYPE 2 DIABETES MELLITUS WITH STAGE 3 CHRONIC KIDNEY DISEASE, WITHOUT LONG-TERM CURRENT USE OF INSULIN: ICD-10-CM

## 2024-07-17 DIAGNOSIS — D64.9 NORMOCYTIC ANEMIA: ICD-10-CM

## 2024-07-17 DIAGNOSIS — J44.1 COPD, FREQUENT EXACERBATIONS: ICD-10-CM

## 2024-07-17 DIAGNOSIS — F32.A DEPRESSION, UNSPECIFIED DEPRESSION TYPE: ICD-10-CM

## 2024-07-17 DIAGNOSIS — J45.40 MODERATE PERSISTENT ASTHMA WITHOUT COMPLICATION: ICD-10-CM

## 2024-07-17 DIAGNOSIS — J30.89 NON-SEASONAL ALLERGIC RHINITIS DUE TO OTHER ALLERGIC TRIGGER: ICD-10-CM

## 2024-07-17 DIAGNOSIS — R56.9 SEIZURE: ICD-10-CM

## 2024-07-17 DIAGNOSIS — I25.110 ATHEROSCLEROSIS OF NATIVE CORONARY ARTERY OF NATIVE HEART WITH UNSTABLE ANGINA PECTORIS: ICD-10-CM

## 2024-07-17 DIAGNOSIS — E87.1 HYPONATREMIA: ICD-10-CM

## 2024-07-17 DIAGNOSIS — F33.1 MODERATE RECURRENT MAJOR DEPRESSION: ICD-10-CM

## 2024-07-17 DIAGNOSIS — M81.0 AGE-RELATED OSTEOPOROSIS WITHOUT CURRENT PATHOLOGICAL FRACTURE: ICD-10-CM

## 2024-07-17 DIAGNOSIS — I10 PRIMARY HYPERTENSION: ICD-10-CM

## 2024-07-17 DIAGNOSIS — E11.59 HYPERTENSION ASSOCIATED WITH DIABETES: ICD-10-CM

## 2024-07-17 DIAGNOSIS — Z86.79 HISTORY OF CEREBRAL ANEURYSM REPAIR: ICD-10-CM

## 2024-07-17 DIAGNOSIS — Z87.891 HISTORY OF TOBACCO ABUSE: ICD-10-CM

## 2024-07-17 DIAGNOSIS — J44.89 ASTHMA WITH COPD: ICD-10-CM

## 2024-07-17 DIAGNOSIS — I50.32 CHRONIC DIASTOLIC HEART FAILURE: ICD-10-CM

## 2024-07-17 DIAGNOSIS — I15.2 HYPERTENSION ASSOCIATED WITH DIABETES: ICD-10-CM

## 2024-07-17 PROCEDURE — 3074F SYST BP LT 130 MM HG: CPT | Mod: HCNC,CPTII,S$GLB, | Performed by: PEDIATRICS

## 2024-07-17 PROCEDURE — 1160F RVW MEDS BY RX/DR IN RCRD: CPT | Mod: HCNC,CPTII,S$GLB, | Performed by: PEDIATRICS

## 2024-07-17 PROCEDURE — 1159F MED LIST DOCD IN RCRD: CPT | Mod: HCNC,CPTII,S$GLB, | Performed by: PEDIATRICS

## 2024-07-17 PROCEDURE — 99215 OFFICE O/P EST HI 40 MIN: CPT | Mod: HCNC,S$GLB,, | Performed by: PEDIATRICS

## 2024-07-17 PROCEDURE — 99999 PR PBB SHADOW E&M-EST. PATIENT-LVL III: CPT | Mod: PBBFAC,HCNC,, | Performed by: PEDIATRICS

## 2024-07-17 PROCEDURE — 1125F AMNT PAIN NOTED PAIN PRSNT: CPT | Mod: HCNC,CPTII,S$GLB, | Performed by: PEDIATRICS

## 2024-07-17 PROCEDURE — G2211 COMPLEX E/M VISIT ADD ON: HCPCS | Mod: HCNC,S$GLB,, | Performed by: PEDIATRICS

## 2024-07-17 PROCEDURE — 3078F DIAST BP <80 MM HG: CPT | Mod: HCNC,CPTII,S$GLB, | Performed by: PEDIATRICS

## 2024-07-17 PROCEDURE — 3072F LOW RISK FOR RETINOPATHY: CPT | Mod: HCNC,CPTII,S$GLB, | Performed by: PEDIATRICS

## 2024-07-17 RX ORDER — ASPIRIN 325 MG
50000 TABLET, DELAYED RELEASE (ENTERIC COATED) ORAL
Qty: 12 CAPSULE | Refills: 0 | Status: SHIPPED | OUTPATIENT
Start: 2024-07-17

## 2024-07-17 RX ORDER — ATORVASTATIN CALCIUM 40 MG/1
40 TABLET, FILM COATED ORAL DAILY
Qty: 90 TABLET | Refills: 1 | Status: SHIPPED | OUTPATIENT
Start: 2024-07-17

## 2024-07-17 RX ORDER — SACUBITRIL AND VALSARTAN 24; 26 MG/1; MG/1
1 TABLET, FILM COATED ORAL 2 TIMES DAILY
Qty: 180 TABLET | Refills: 3 | Status: SHIPPED | OUTPATIENT
Start: 2024-07-17

## 2024-07-17 RX ORDER — FLUTICASONE PROPIONATE 50 MCG
2 SPRAY, SUSPENSION (ML) NASAL DAILY
Qty: 16 G | Refills: 11 | Status: SHIPPED | OUTPATIENT
Start: 2024-07-17

## 2024-07-17 RX ORDER — CLOPIDOGREL BISULFATE 75 MG/1
75 TABLET ORAL DAILY
Qty: 90 TABLET | Refills: 3 | Status: SHIPPED | OUTPATIENT
Start: 2024-07-17

## 2024-07-17 RX ORDER — FUROSEMIDE 20 MG/1
20 TABLET ORAL DAILY PRN
Qty: 30 TABLET | Refills: 11 | Status: SHIPPED | OUTPATIENT
Start: 2024-07-17 | End: 2025-07-17

## 2024-07-17 RX ORDER — ALBUTEROL SULFATE 0.83 MG/ML
2.5 SOLUTION RESPIRATORY (INHALATION) EVERY 4 HOURS PRN
Qty: 180 ML | Refills: 11 | Status: SHIPPED | OUTPATIENT
Start: 2024-07-17

## 2024-07-17 RX ORDER — ALBUTEROL SULFATE 90 UG/1
2 AEROSOL, METERED RESPIRATORY (INHALATION) EVERY 4 HOURS PRN
Qty: 18 G | Refills: 11 | Status: SHIPPED | OUTPATIENT
Start: 2024-07-17

## 2024-07-17 RX ORDER — LEVETIRACETAM 500 MG/1
500 TABLET ORAL 2 TIMES DAILY
Qty: 60 TABLET | Refills: 2 | Status: SHIPPED | OUTPATIENT
Start: 2024-07-17

## 2024-07-17 RX ORDER — ISOSORBIDE MONONITRATE 120 MG/1
120 TABLET, EXTENDED RELEASE ORAL NIGHTLY
Qty: 90 TABLET | Refills: 3 | Status: SHIPPED | OUTPATIENT
Start: 2024-07-17 | End: 2025-07-17

## 2024-07-17 RX ORDER — LEVETIRACETAM 750 MG/1
750 TABLET ORAL 2 TIMES DAILY
Qty: 60 TABLET | Refills: 11 | Status: SHIPPED | OUTPATIENT
Start: 2024-07-17 | End: 2025-07-17

## 2024-07-17 RX ORDER — OMEPRAZOLE 40 MG/1
40 CAPSULE, DELAYED RELEASE ORAL EVERY MORNING
Qty: 90 CAPSULE | Refills: 3 | Status: SHIPPED | OUTPATIENT
Start: 2024-07-17

## 2024-07-17 RX ORDER — ESCITALOPRAM OXALATE 20 MG/1
20 TABLET ORAL DAILY
Qty: 90 TABLET | Refills: 2 | Status: SHIPPED | OUTPATIENT
Start: 2024-07-17

## 2024-07-17 RX ORDER — BISOPROLOL FUMARATE 5 MG/1
5 TABLET, FILM COATED ORAL DAILY
Qty: 30 TABLET | Refills: 11 | Status: SHIPPED | OUTPATIENT
Start: 2024-07-17 | End: 2025-07-17

## 2024-07-17 RX ORDER — MONTELUKAST SODIUM 10 MG/1
10 TABLET ORAL DAILY
Qty: 90 TABLET | Refills: 3 | Status: SHIPPED | OUTPATIENT
Start: 2024-07-17

## 2024-07-17 RX ORDER — FLUTICASONE FUROATE, UMECLIDINIUM BROMIDE AND VILANTEROL TRIFENATATE 200; 62.5; 25 UG/1; UG/1; UG/1
1 POWDER RESPIRATORY (INHALATION) DAILY
Qty: 60 EACH | Refills: 11 | Status: SHIPPED | OUTPATIENT
Start: 2024-07-17

## 2024-07-17 RX ORDER — IPRATROPIUM BROMIDE 42 UG/1
2 SPRAY, METERED NASAL 2 TIMES DAILY
Qty: 15 ML | Refills: 11 | Status: SHIPPED | OUTPATIENT
Start: 2024-07-17

## 2024-07-17 RX ORDER — NITROGLYCERIN 0.4 MG/1
0.4 TABLET SUBLINGUAL EVERY 5 MIN PRN
Qty: 100 TABLET | Refills: 0 | Status: SHIPPED | OUTPATIENT
Start: 2024-07-17

## 2024-07-17 RX ORDER — RANOLAZINE 1000 MG/1
1000 TABLET, EXTENDED RELEASE ORAL 2 TIMES DAILY
Qty: 180 TABLET | Refills: 3 | Status: SHIPPED | OUTPATIENT
Start: 2024-07-17

## 2024-07-17 RX ORDER — SPIRONOLACTONE 50 MG/1
50 TABLET, FILM COATED ORAL DAILY
Qty: 90 TABLET | Refills: 1 | Status: SHIPPED | OUTPATIENT
Start: 2024-07-17 | End: 2025-07-17

## 2024-07-17 NOTE — PROGRESS NOTES
"Patient ID: Shireen Felix is a 78 y.o. female.    Chief Complaint: Hospital Follow Up (Dx 3 weeks ago from [facility] CHF) and Eye Problem    History of Present Illness    CHIEF COMPLAINT:  Patient presents today for hospital follow-up.    CONGESTIVE HEART FAILURE:  She reports difficulty with activities of daily living due to congestive heart failure. She experiences significant shortness of breath with exertion, limiting her ability to perform daily tasks. She feels she is not moving as fast as she believes she should and requires assistance with bathing and other activities, currently receiving home health care once a week for four hours. She is concerned about her limited endurance and strength, noting that talking and walking significantly affect her. Her EF is around 32-35%. She has slowed down considerably over the past year and is struggling to regain her previous level of function. She denies ability to tolerate outdoor activities due to heat intolerance.    COPD:  She reports experiencing shortness of breath and feeling "short-winded" with difficulty in mobility. She denies current need for supplemental oxygen, stating her oxygen levels "as a rule" remain good. However, she notes fluctuations in her oxygen readings, with occasional low readings (e.g., 78) that resolve upon retaking. She uses a home oxygen monitor and has regular nurse visits to check her blood oxygen levels. Her current pulse oximetry reading is 99% on room air. She understands that her condition is end-stage and may eventually require permanent oxygen therapy.    CARDIAC HISTORY:  She has a history of heart disease, including congestive heart failure. She has difficulty recovering from recent hospitalizations related to her cardiac condition, with significant impact on her daily activities, particularly shortness of breath with talking and walking. She notes some improvement, as her healthcare provider indicated that her heart sounds " pretty good currently. However, she understands that the damage to her heart is not reversible, and she may be near the best condition she can expect given her cardiac status. She is aware that her heart condition, along with her COPD, contributes to her current limitations and requires ongoing management with medications.    SEIZURES:  She reports having seizures. Healthcare providers at the hospital recommended she go to rehabilitation rather than return home due to her seizure condition.    RECENT HOSPITALIZATION:  She was recently hospitalized for congestive heart failure. She has difficulty regaining her previous level of function, with significant impact on her ability to talk and walk. She is currently receiving home health care once a week for four hours, primarily to assist with bathing. The hospital recommended rehabilitation, but she opted to return home with home health support. She has a home monitor for blood oxygen levels and states that her oxygen levels have been fluctuating. Her oxygen levels have historically been good, even during previous episodes of pneumonia. She feels slower in her movements over the past week but is learning to understand her condition better. She denies any specific   complaints related to her heart or lungs at present.    HOME HEALTH CARE:  She is receiving home health care once a week for four hours, primarily to assist with bathing. She has a home health monitor to track her oxygen levels. Her SpO2 has been fluctuating, with one instance of a reading as low as 78%, though it typically improves upon retaking the measurement. Her oxygen levels have generally been good in the past, even during episodes of pneumonia. She denies currently requiring oxygen supplementation, with a recent pulse oximetry reading of 99% on room air. She is concerned about the variability in her oxygen readings and the home health nurse was planning to contact the provider about these  "fluctuations.    MOOD:  She reports feeling down about her slow recovery. She is frustrated with her current limitations, noting that she is typically a "go-getter" who recovers quickly from health setbacks. However, in the past week, she has not been moving as fast as she feels she should. She is struggling to adjust to her new physical limitations due to end-stage lung disease and recent hospitalizations. She feels short of breath and experiences significant fatigue with minimal exertion, such as walking one lap around her house. She is concerned about her ability to regain her previous level of function and independence, recognizing that she may need more assistance from family members for daily activities.    MUSCLE MASS LOSS:  She reports muscle mass loss due to prolonged hospitalization. She is concerned about her recovery progress, noting that she is not moving as fast as she feels she should. She feels somewhat discouraged but is generally coping well. She has been learning more about her condition from healthcare providers, who have explained that her heart and lungs are currently in good condition. She has been advised to focus on rebuilding muscle mass through healthy eating, ensuring adequate protein intake, and potentially using supplements like Ensure or Muscle Milk. She has been instructed to gradually increase her activity levels within the home while using assistive devices such as a walker to prevent falls.    EYE INJURY:  She reports experiencing a right eye injury on Thursday. Her right eye suddenly "filled up" while she was talking to someone. She denies rubbing or scratching the eye prior to the incident. She contacted her doctor on Friday regarding this issue. She reports no other associated symptoms or concerns related to the eye injury.    PAST MEDICAL HISTORY:  Pneumonia    FAMILY SUPPORT:  She is concerned about future care arrangements and the need for family support. She acknowledges her " limitations due to COPD, heart disease, and heart failure, recognizing that her condition may not significantly improve. She has home health care once a week for four hours to assist with bathing. She is reluctant to move in with her children, as they both work, but her brother helps by providing transportation. She is uncertain about her [facility] living situation and care needs. She does not want to burden her family but acknowledges the need to discuss realistic care options with them. She is aware of her limited mobility and the need to gradually increase her activity levels, such as walking laps inside her house.    PMH, PSH, SH, FH reviewed with patient.  ROS:  General: -fever, -chills, +fatigue, -weight gain, -weight loss  Eyes: -vision changes, -redness, -discharge, -eye pain  ENT: -ear pain, -nasal congestion, -sore throat  Cardiovascular: -chest pain, -palpitations, -lower extremity edema  Respiratory: -cough, +shortness of breath, +shortness of breath  Gastrointestinal: -abdominal pain, -nausea, -vomiting, -diarrhea, -constipation, -blood in stool  Genitourinary: -dysuria, -hematuria, -frequency  Musculoskeletal: -joint pain, -muscle pain  Skin: -rash, -lesion  Neurological: -headache, -dizziness, -numbness, -tingling  Psychiatric: -anxiety, +depression, -sleep difficulty  Endocrine: +heat intolerance         Exam:  Physical Exam    Vitals: Pulse ox 99% on room air.  General: No acute distress. Well-developed. Well-nourished.  Eyes: EOMI. Sclerae anicteric. Hemorrhagic sclera.  HENT: Normocephalic. Atraumatic. Nares patent. Moist oral mucosa.  Cardiovascular: Regular rate. Regular rhythm. No murmurs. No rubs. No gallops. Normal S1, S2.  Respiratory: Normal respiratory effort. Clear to auscultation bilaterally. No rales. No rhonchi. No wheezing.  Abdomen: Soft. Non-tender. Non-distended. Normoactive bowel sounds.  Musculoskeletal: No  obvious deformity.  Extremities: No lower extremity edema.  Neurological:  Alert & oriented x3. No slurred speech. Normal gait.  Psychiatric: Normal mood. Normal affect. Good insight. Good judgment.  Skin: Warm. Dry. No rash.         Assessment/Plan:  Mucopurulent chronic bronchitis  -     albuterol (PROVENTIL) 2.5 mg /3 mL (0.083 %) nebulizer solution; Take 3 mLs (2.5 mg total) by nebulization every 4 (four) hours as needed for Wheezing. Rescue  Dispense: 180 mL; Refill: 11  -     albuterol (PROVENTIL/VENTOLIN HFA) 90 mcg/actuation inhaler; Inhale 2 puffs into the lungs every 4 (four) hours as needed for Wheezing. Rescue  Dispense: 18 g; Refill: 11    Controlled type 2 diabetes mellitus with stage 3 chronic kidney disease, without long-term current use of insulin  -     Comprehensive Metabolic Panel; Future; Expected date: 07/17/2024  -     Lipid Panel; Future; Expected date: 07/17/2024  -     Hemoglobin A1C; Future; Expected date: 07/17/2024  -     Microalbumin/Creatinine Ratio, Urine; Future; Expected date: 07/17/2024    Coronary artery disease of native artery of native heart with stable angina pectoris  -     atorvastatin (LIPITOR) 40 MG tablet; Take 1 tablet (40 mg total) by mouth once daily.  Dispense: 90 tablet; Refill: 1  -     clopidogreL (PLAVIX) 75 mg tablet; Take 1 tablet (75 mg total) by mouth once daily.  Dispense: 90 tablet; Refill: 3  -     ranolazine (RANEXA) 1,000 mg Tb12; Take 1 tablet (1,000 mg total) by mouth 2 (two) times daily.  Dispense: 180 tablet; Refill: 3    History of stroke    Seizure    Hyponatremia    Normocytic anemia  -     CBC Auto Differential; Future; Expected date: 07/17/2024    Age-related osteoporosis without current pathological fracture    Chronic diastolic heart failure    Depression, unspecified depression type    Hypertension associated with diabetes    Gastroesophageal reflux disease, unspecified whether esophagitis present    Primary hypertension  -     bisoprolol (ZEBETA) 5 MG tablet; Take 1 tablet (5 mg total) by mouth once daily.   Dispense: 30 tablet; Refill: 11    History of tobacco abuse    Mucopurulent chronic bronchitis  -     albuterol (PROVENTIL) 2.5 mg /3 mL (0.083 %) nebulizer solution; Take 3 mLs (2.5 mg total) by nebulization every 4 (four) hours as needed for Wheezing. Rescue  Dispense: 180 mL; Refill: 11  -     albuterol (PROVENTIL/VENTOLIN HFA) 90 mcg/actuation inhaler; Inhale 2 puffs into the lungs every 4 (four) hours as needed for Wheezing. Rescue  Dispense: 18 g; Refill: 11    Moderate persistent asthma without complication  -     albuterol-budesonide (AIRSUPRA) 90-80 mcg/actuation; Inhale 2 puffs into the lungs every 4 (four) hours as needed (wheezing, cough).  Dispense: 10.7 g; Refill: 11  -     fluticasone-umeclidin-vilanter (TRELEGY ELLIPTA) 200-62.5-25 mcg inhaler; Inhale 1 puff into the lungs once daily.  Dispense: 60 each; Refill: 11    COPD, frequent exacerbations  -     albuterol-budesonide (AIRSUPRA) 90-80 mcg/actuation; Inhale 2 puffs into the lungs every 4 (four) hours as needed (wheezing, cough).  Dispense: 10.7 g; Refill: 11    Asthma with COPD  -     albuterol-budesonide (AIRSUPRA) 90-80 mcg/actuation; Inhale 2 puffs into the lungs every 4 (four) hours as needed (wheezing, cough).  Dispense: 10.7 g; Refill: 11    Vitamin D insufficiency  -     cholecalciferol, vitamin D3, 1,250 mcg (50,000 unit) capsule; Take 1 capsule (50,000 Units total) by mouth every 7 days.  Dispense: 12 capsule; Refill: 0    Moderate recurrent major depression  -     EScitalopram oxalate (LEXAPRO) 20 MG tablet; Take 1 tablet (20 mg total) by mouth once daily.  Dispense: 90 tablet; Refill: 2    Non-seasonal allergic rhinitis due to other allergic trigger  -     fluticasone propionate (FLONASE) 50 mcg/actuation nasal spray; 2 sprays (100 mcg total) by Each Nostril route once daily.  Dispense: 16 g; Refill: 11  -     ipratropium (ATROVENT) 42 mcg (0.06 %) nasal spray; 2 sprays by Each Nostril route 2 (two) times daily.  Dispense: 15 mL;  Refill: 11    Acute on chronic combined systolic and diastolic congestive heart failure  -     furosemide (LASIX) 20 MG tablet; Take 1 tablet (20 mg total) by mouth daily as needed (for edema, swelling, fluid build up, or 3 pound weight gain in 24 hours).  Dispense: 30 tablet; Refill: 11  -     isosorbide mononitrate (IMDUR) 120 MG 24 hr tablet; Take 1 tablet (120 mg total) by mouth every evening.  Dispense: 90 tablet; Refill: 3    Syncope and collapse  -     levETIRAcetam (KEPPRA) 750 MG Tab; Take 1 tablet (750 mg total) by mouth 2 (two) times daily.  Dispense: 60 tablet; Refill: 11    History of cerebral aneurysm repair  -     levETIRAcetam (KEPPRA) 750 MG Tab; Take 1 tablet (750 mg total) by mouth 2 (two) times daily.  Dispense: 60 tablet; Refill: 11    Chronic allergic rhinitis  -     montelukast (SINGULAIR) 10 mg tablet; Take 1 tablet (10 mg total) by mouth once daily.  Dispense: 90 tablet; Refill: 3    Atherosclerosis of native coronary artery of native heart with unstable angina pectoris  -     nitroGLYCERIN (NITROSTAT) 0.4 MG SL tablet; Place 1 tablet (0.4 mg total) under the tongue every 5 (five) minutes as needed for Chest pain.  Dispense: 100 tablet; Refill: 0    Other orders  -     empagliflozin (JARDIANCE) 10 mg tablet; Take 1 tablet (10 mg total) by mouth once daily.  Dispense: 90 tablet; Refill: 3  -     omeprazole (PRILOSEC) 40 MG capsule; Take 1 capsule (40 mg total) by mouth every morning.  Dispense: 90 capsule; Refill: 3  -     sacubitriL-valsartan (ENTRESTO) 24-26 mg per tablet; Take 1 tablet by mouth 2 (two) times daily.  Dispense: 180 tablet; Refill: 3  -     spironolactone (ALDACTONE) 50 MG tablet; Take 1 tablet (50 mg total) by mouth once daily.  Dispense: 90 tablet; Refill: 1         Assessment & Plan    I51.9 Heart disease, unspecified  E87.1 Hypo-osmolality and hyponatremia  I50.32 Chronic diastolic (congestive) heart failure  K52.9 Noninfective gastroenteritis and colitis,  unspecified  H11.31 Conjunctival hemorrhage, right eye  F32.9 Major depressive disorder, single episode, unspecified  J43.9 Emphysema, unspecified  D64.9 Anemia, unspecified  R56.9 Unspecified convulsions  R80.9 Proteinuria, unspecified  J44.9 Chronic obstructive pulmonary disease, unspecified  END-STAGE LUNG DISEASE AND HEART FAILURE:  - Patient has end-stage lung disease and heart failure that will not significantly improve.  - Damage to lungs and heart is not reversible at this point.  - Patient may eventually require permanent oxygen supplementation in the future if oxygen levels worsen, but currently does not qualify based on pulse oximetry of 99% on room air.  - Educated on realistic expectations for recovery given end-stage lung disease and heart failure.  - Advised that complete return to prior level of function is unlikely, but small improvements may be possible with consistent effort.  - Discussed importance of long-term care needs and living situation with family to ensure safety and appropriate support.  - Explained that slightly low sodium is related to lung disease and heart failure medications.  - Patient to slowly increase physical activity tolerance by walking laps inside the house.  - Start with a distance that causes fatigue, then very gradually increase distance over time as endurance improves.  - Use a walker for safety.  ANEMIA:  - Mild anemia is slowly improving and attributed to prolonged illness.  - No intervention needed at this time.  DIABETES:  - Diabetes and cholesterol are well-controlled.  HYPONATREMIA:  - Slightly low sodium of 134, unchanged from 2 months prior, is related to lung disease and medications for heart failure.  - Recommend encouraging oral hydration with water, but avoid excess sodium intake.  MICROALBUMINURIA:  - Trace microalbuminuria is improved compared to prior and acceptable given comorbidities.  NUTRITION:  - Recommend focusing on consuming a healthy diet with  adequate protein intake.  - May use nutritional supplements like Ensure or Muscle Milk if needed to support strength.  OTHER INSTRUCTIONS:  - Patient to have a discussion with family members about long-term care needs and living arrangements to ensure appropriate support and safety.  FOLLOW UP:  - Follow up in 6 months with labs prior to appointment.          Visit today included increased complexity associated with the care of the episodic problem  addressed and managing the longitudinal care of the patient due to the serious and/or complex managed problem(s) .      Follow up in about 6 months (around 1/17/2025).    This note was generated with the assistance of ambient listening technology. Verbal consent was obtained by the patient and accompanying visitor(s) for the recording of patient appointment to facilitate this note. I attest to having reviewed and edited the generated note for accuracy, though some syntax or spelling errors may persist. Please contact the author of this note for any clarification.

## 2024-07-17 NOTE — TELEPHONE ENCOUNTER
----- Message from Suzan Cruz sent at 7/17/2024  2:51 PM CDT -----  Contact: Jetty  Patient has appointment tomorrow and want to see if this appointment can be virtual. Please call us to let us know.  Please callback  [phone]

## 2024-07-17 NOTE — TELEPHONE ENCOUNTER
----- Message from Laron Chaudhari MD sent at 7/17/2024 11:18 AM CDT -----  He pulse ox was 99%, no resp distress nor cough and lungs clear.  ----- Message -----  From: Suzan Connor MA  Sent: 7/17/2024   9:46 AM CDT  To: Laron Chaudhari MD    Just an FYI.  ----- Message -----  From: Belinda Keyes  Sent: 7/17/2024   9:36 AM CDT  To: Watson Skinner Jr Staff    [facility] is calling to inform that the pt oxygen levels 83 percent and with a recheck it was 93 percent. She has been coughing more with grey color mucus. They would like a follow up at her appt today.

## 2024-07-17 NOTE — TELEPHONE ENCOUNTER
Called patient and rescheduled appt being that patient had congestive heart failure and was in the hospital a couple weeks and was on a monitor machine.    Sheree CAMPBELL

## 2024-07-17 NOTE — TELEPHONE ENCOUNTER
----- Message from Tressa Rodriguez sent at 7/17/2024 11:30 AM CDT -----  Contact: Aide/ [facility]  Aide is calling to speak to the nurse regarding the patient receiving medication from other providers and she would like clarification, please give her a call at     Thanks  LJ

## 2024-07-22 DIAGNOSIS — H40.1132 PRIMARY OPEN ANGLE GLAUCOMA (POAG) OF BOTH EYES, MODERATE STAGE: ICD-10-CM

## 2024-07-22 RX ORDER — LATANOPROST 50 UG/ML
1 SOLUTION/ DROPS OPHTHALMIC NIGHTLY
Qty: 2.5 ML | Refills: 3 | Status: SHIPPED | OUTPATIENT
Start: 2024-07-22 | End: 2024-10-20

## 2024-07-22 RX ORDER — DORZOLAMIDE HYDROCHLORIDE AND TIMOLOL MALEATE 20; 5 MG/ML; MG/ML
1 SOLUTION/ DROPS OPHTHALMIC EVERY 12 HOURS
Qty: 10 ML | Refills: 3 | Status: SHIPPED | OUTPATIENT
Start: 2024-07-22 | End: 2024-10-20

## 2024-07-22 NOTE — TELEPHONE ENCOUNTER
Patient accidentally left her drops in [place] while on vacation and needs refills in a 90 day supply sent to the pharmacy.        ----- Message from Suzan Cruz sent at 7/22/2024  1:16 PM CDT -----  Contact: Shireen  Patient is calling stating she left her drops when she went out of town and need a callback from the office to get refill on drops and glaucoma medication. Please callback [phone] to assist        [facility] - [address] [address] [facility]  [address] [address] [address]  Phone: [phone] Fax: [phone]

## 2024-07-23 ENCOUNTER — EXTERNAL HOME HEALTH (OUTPATIENT)
Dept: HOME HEALTH SERVICES | Facility: HOSPITAL | Age: 79
End: 2024-07-23
Payer: MEDICARE

## 2024-07-25 ENCOUNTER — EXTERNAL HOME HEALTH (OUTPATIENT)
Dept: HOME HEALTH SERVICES | Facility: HOSPITAL | Age: 79
End: 2024-07-25
Payer: MEDICARE

## 2024-07-26 ENCOUNTER — TELEPHONE (OUTPATIENT)
Dept: INTERNAL MEDICINE | Facility: CLINIC | Age: 79
End: 2024-07-26

## 2024-07-26 ENCOUNTER — DOCUMENT SCAN (OUTPATIENT)
Dept: HOME HEALTH SERVICES | Facility: HOSPITAL | Age: 79
End: 2024-07-26
Payer: MEDICARE

## 2024-07-30 ENCOUNTER — PATIENT MESSAGE (OUTPATIENT)
Dept: INTERNAL MEDICINE | Facility: CLINIC | Age: 79
End: 2024-07-30
Payer: MEDICARE

## 2024-07-30 DIAGNOSIS — Z00.00 ENCOUNTER FOR MEDICARE ANNUAL WELLNESS EXAM: ICD-10-CM

## 2024-07-31 NOTE — PROGRESS NOTES
Established Patient Interventional Pain Clinic Visit    PCP: Laron Chaudhari MD    Chief Pain Complaint:  Neck, back pain      Interval History (8/1/2024):   Shireen Felix presents today for follow-up visit.  Patient was last seen on 4/29/2024. Patient is here today for trigger point injections.  Patient reports pain as 10/10 today. She is no longer taking Gabapentin due to side effects.  At this time she is taking Tizanidine as needed.  Patient was hospitalized recently due to CHF and gastroenteritis. See note below. She has home health and physical therapy.        Interval History (4/29/2024):  Shireen Felix presents today for follow-up visit.  Patient he is seen today for back pain.  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  She recently finished up a steroid taper as well as antibiotics.  She has some upcoming appointments to follow up with Cardiology.  She states while in the hospital she had a lot of coughing but she feels made her lower back and upper back pain worse.  She feels like she is having diffuse spasms in the back.  At times she will have pain that radiates down the back of the legs.  She has pain over bilateral hips especially never lying on either side.  She continues to take her gabapentin 300 mg 3 times a day reports she has not been taking the tizanidine.  She is inquiring about injections today.  She also asked for hydrocodone to help with her pain as this is what gave her relief in the hospital.  Patient denies night fever/night sweats, urinary incontinence, bowel incontinence, significant weight loss and significant motor weakness.   Patient denies any other complaints or concerns at this time.      Interval history 02/27/2024  Patient presents status post C6-7 interlaminar epidural steroid injection with right paramedian approach 02/07/2024.  Patient reports approximately 70%-80% sustained relief in neck, upper  extremity radicular symptoms following epidural steroid injection.  She reports significant improvement in radicular symptoms, numbness and tingling in bilateral arms and compromise in hand  strength and dexterity.  Today she reports residual pain particularly in the left shoulder territory.  Of note patient has not received glenohumeral joint injection since 2020 with Dr. Dias.  At that time she does report noticeable improvement exceeding 3 months in duration with her prior procedure.  Today she does report pain is exacerbated with overhead movements and shoulder internal/external rotation.  She is continued physician directed physical therapy exercises over the last 8 weeks from 12/27/2023 through 02/27/2024 with noticeable improvement in pain, range of motion and functionality and neck and arms and shoulders.    Interval History (7/31/2024):  Shireen Felix presents today for follow-up visit.  Patient was last seen on 11/8/2023. At that visit, the plan was to perform TPI on upper back and return to clinic 3-4 weeks for TPI on lower back. Patient reports pain as 8/10 today.  Patient reports previous TPI did help but she continues to have pain on R side of upper back, bilateral arm pain and numbness/tingling in both hands. She also has pain involving R shoulder joint.     Interval History (11/08/2023):  Shireen Felix presents today for follow-up visit.  she underwent sacrococcygeal joint injection on 5/17/24.  The patient reports that she is/was better following the procedure.  she reports 80% pain relief.  The changes lasted so far.  The changes have continued through this visit. The patient now c/o pain related to myofascial pain in upper back. She also pain in lower back (lumbar paraspinal) but the upper back pain is most concerning at this time.  Patient reports pain as 8/10 today.  The patient also c/o itching in her scalp with hair loss over the past two months.    Interval history  10/11/2023  Patient presents status post bilateral sacroiliac joint and greater trochanteric bursa injection 07/28/2023.  Patient reports approximately 90% sustained relief overlying bilateral sacroiliac joint and GT bursa.  Today she presents following a mechanical fall.  Patient reports approximately 5 days prior going to the bathroom in the middle of the night, losing her balance and hitting the back of her head and lower back.  Today she reports pain which is constant which is rated an 8/10.  Patient reports pain along bilateral cervical paraspinous musculature which is exacerbated with cervical flexion/extension and lateral flexion.  She reports this pain has improved over the last few days.  Her primary concern is pain along her coccyx.  Patient reports tenderness to touch in pain with sitting as well as sitting or standing.  Patient is requesting x-rays for evaluation.  She also would like to restart physical therapy.  Of note she is going to [facility] in [place] and would like to restart dry needling, soft massage and assistance with balance.    Interval History (6/27/2023):  Shireen Felix presents today for follow-up visit.  Patient was last seen on 5/16/2023. She reports some improvement in her pain with TPIs given at last visit. She reports continued variations in her BP throughout the day. She has followed up with Neurology who has made changes to her seizure medications. Seems to be tolerating med change well. Continues to monitor BP. Has followed up with PCP and other specialist to rule out other causes for BP changes (renal, intracranial, etc).  Patient reports pain as 7/10 today. No changes on CT compared to previous.  Interval History (6/14/2023):  Shireen Felix presents today for trigger point injections.  Patient was last seen on 06/09/2023. Patient reports pain as 8/10 today. She continues to report pain as primarily located in her lower lumbosacral region with radiation into her  buttocks, right worse than left, and wrapping around her lateral hips. She reports additional falls since last visit. She reports worsening neck pain with radiation into her left arm down to her hand with cramping and burning.  She also reports worsening symptoms down both legs.  interfering with her ability to walk at times due to significant weakness. She reports repeated falls due to her legs giving out from underneath her unexpectedly.       Interval History (6/9/2023):  Shireen Felix presents today via telemed for follow-up visit.  Patient was last seen on 05/16/2023. Last injection bilateral L4/5 TFESI on 4/26/23 with 80% relief. Patient reports pain as 10/10 today. She reports pain that began over the past several weeks, became severe 2 days ago. She reports pain is primarily located in her lower lumbosacral region with radiation into her buttocks, right worse than left, and wrapping around her lateral hips. Pain is similar to that prior to SIJ and GT bursa injection, last SIJ injection 11/16/2021 with excellent relief until a few weeks ago when pain returned. She reports in the past she has also received trigger point injections in her lower back that were very helpful.    Interval History (5/16/2023): Shireen Felix presents today for follow-up visit.  she underwent bilateral L4/5 TFESI on 4/26/23.  The patient reports that she is/was better following the procedure.  she reports 80% pain relief.  The changes lasted 4 weeks so far.  The changes have continued through this visit.  Patient reports pain as 7/10 today. She reports the procedure itself was very painful. Reports she did not feel sedated at all compared to previous procedures with other providers. She is not interested in pursuing additional procedures secondary to the painful procedure.  She reports worsening neck pain with radiation into her right arm down to her hand with cramping and burning.    Interval History (3/9/2023):  Shireen  Veronica Felix presents today for follow-up visit for CT and EMG review.  Patient was last seen on 2/1/2023. At that visit, the plan was to order updated CT of her cervical and lumbar spine as well as EMG of her bilateral upper extremities. Patient reports pain as 7/10 today.  She reports worsening neck pain with radiation into her left arm down to her hand with cramping and burning.  She also reports worsening symptoms down both legs.  interfering with her ability to walk at times due to significant weakness. Has an appointment with Dr. Navarro on 03/21/2023. Patient fell hit head on concrete 2-11-23, and had a seizure shortly after.  She reports repeated falls due to her legs giving out from underneath her unexpectedly. Referred to Neurology.  She is also currently on antibiotics secondary to having 10 teeth pulled     Interval History (1/31/2023):  Shireen Felix presents today for follow-up visit.  Patient was last seen on 10/4/2022. At that visit, the plan was to schedule bilateral L3-5 MBB followed 2 weeks later by XANDER.  She canceled these procedures and reports at this time she is not interested in scheduling.  She reports she has a  and goes to the gym 3-4 times per week which includes walking on a treadmill.   Patient reports pain as 7/10 today. She reports worsening neck pain with radiation into her left arm down to her hand with cramping and burning. This has been severe over the past week. Last cervical CT in 2019, she reports she has sustained multiple falls since then. She also reports worsening symptoms down her left leg, interfering with her ability to walk at times due to significant weakness.      Interval history 10/04/2022  Ms. Felix is a 76-year-old female with past medical history significant for cerebral aneurysm status post craniotomy and repair, cerebral vascular accident, left V1 distribution post herpetic neuralgia, depression, glaucoma, asthma/COPD, coronary artery  disease, GERD, nicotine dependence, type 2 diabetes who presents to [facility] care, previous Dr. Dias and Dr. Fulton patient.  Today patient reports pain in the lower back and legs.  Pain is constant today is rated 7/10.  Patient reports pain in a bandlike distribution in the lower back which radiates down the posterior aspects of bilateral lower extremities in L5-S1 distribution to mid thigh.  Pain is described as burning and aching in nature.  Pain is exacerbated when moving from sitting to standing and standing and ambulating just a few feet.  Patient does report associated weakness in the lower extremities associated with her pain.  Patient reports she is able to ambulate with assistive device, walker only a few feet before requiring rest.  Pain has been improved with prior procedures, including medial branch block and transforaminal epidural steroid injection.  Patient is interested in pursuing repeat injection.  Of note patient has trialed medicinal marijuana with Dr. Mckeon and reports palpitations side effect.  Patient has discontinued tramadol, tizanidine and gabapentin.  Pain is improved with prednisone which she takes prescribed by her pulmonologist.    Interval history:  Dr. Fulton:  05/10/2021-05/06/2022  Shireen Felix is a 76 y.o. female  who is presenting with a chief complaint of Neck Pain. The patient began experiencing this problem insidiously, and the pain has been gradually worsening over the past 6 month(s). The pain is described as throbbing, cramping, aching and heavy and is located in the bilateral cervical spine. Pain is intermittent and lasts hours. The  pain is nonradiating. The patient rates her pain a 7 out of ten and interferes with activities of daily living a 7 out of ten. Pain is exacerbated by rotation of the cervical spine, and is improved by rest. Patient reports no prior trauma, no prior spinal surgery      This patient is a 74 y.o. female who presents today complaining of  the above noted pain/s. The patient describes the pain as follows.  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  She reports approximately 2 years ago she had to sudden deaths in the family which caused very stressful time for her and resulted in shingles rash in the left V1 distribution however she reports having excruciating pain in the left V1 and V2 distributions.  She denies having difficulty with eating food and brushing her teeth on the left side of her face however the left lateral side of her nose gets her excruciating pain.  She has been tried on numerous medications in the past including gabapentin, Lyrica, Valtrex, Celebrex, ibuprofen which have all provided minimal benefit however steroids have been most helpful.  Today she rates her pain as an 4/10 and describes a constant burning sensation the left side of her face in addition to radiation into her bilateral lower extremities, her right shoulder, her left upper extremity occasionally as a pins and needle sensation.  She does report having numbness and weakness in her left lower extremity.  She has been physical therapy which she completed in February of 2019 however this caused most of her low back and leg symptoms to worsen in addition to her post herpetic neuralgia to worsen.  She denies having bowel bladder difficulties.  Her symptoms are worse with activity such as walking in her somewhat improved with rest however she had finds it difficult to get into a comfortable position. She has been using topical lidocaine patches and applying to the left side of her face which does provide significant benefit.  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     Shireen Felix is a 76  y.o. female  who is presenting with a chief complaint of lumbar spine. The patient began experiencing this problem insidiously, and the pain has been gradually worsening over the past 2 year(s). The pain is described as throbbing, shooting, burning, aching and electrical and is located in the bilateral lumbar spine. Pain is intermittent and lasts hours. The pain radiates to bilateral lower extremities. The patient rates her pain a 7 out of ten and interferes with activities of daily living a 6 out of ten. Pain is exacerbated by flexion of the lumbar spine, and is improved by rest. Patient reports no prior trauma, no prior spinal surgery     Interval HPI 06/03/2020: Dr. Dias:  Ms. Felix returns to clinic for follow-up.  She underwent a T12-L1 transforaminal epidural steroid injection on March 10, 2020 and she reports that this has provided significant pain relief for her which radiated into her right leg prior to the injection.  She does report that his symptoms have returned and the injections worn off her pain is currently rated 7/10.  She was scheduled to undergo bilateral lumbar radiofrequency ablation prior to corona virus however she would like to hold off on that at this time and pursue repeat injection in the low back.  Unfortunately she also reports that she had her wheelchair fall over a few weeks ago and this has resulted in bilateral shoulder pain.  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  She finds that rest does provide some pain relief on activity causes her symptoms to worsen.  She has been able to walk significantly more after her most recent injection in March reported she is able to walk several steps before having to sit rest however this has a vast improvement from prior to the injection.    Interval HPI:  02/12/2020; Dr. Dias  Ms. Felix returns to clinic for follow-up.  She underwent  bilateral lumbar medial branch blocks on January 31, 2020 and reports 100% symptomatic pain relief for approximately 1 week and his symptoms slowly began to return.  She continues to have some right-sided lower thoracic posterior pain which was not completely dressed with the medial branch blocks.  Today she rates her pain as an 8/10 which is located primarily in the axial lumbar spine and the lower right posterior thoracic region.  She denies having numbness weakness in the legs but does continue to have a constant aching and throbbing sensation in the low back.    Interval HPI 01/23/2020: Dr. Dias: Ms. Felix returns to clinic for follow-up.  She reports that she underwent bilateral L4/5 transforaminal epidural steroid injections in November 2019 reports ongoing 80% symptomatic pain relief.  She has also been participating physical therapy which she has found to be very helpful however 4 days ago she started to do some leg raise is in her bed and she felt severe pain in her low back which has not subsided.  She denies having any radiation except for around her flank into her anterior abdomen.  She feels like the pain sometimes comes straight through from her back to her abdomen.  Today she rates her pain as a 10/10 describes it as a constant aching and sharp pain. She has been using a heating pad which is minimally helpful and she has been holding off on physical therapy at this time. She denies having bowel bladder difficulty.     Interval History: Patient was seen on 8/4/19. At that time she underwent bilateral L3/4 TF XANDER.  The patient reports that she is/was better following the procedure.  she reports 75% pain relief.  She had a fall, then pain increased. She is currently living at [facility]. She is doing at home therapy there, which is helping. She is in wheelchair now but hopes to transition to walker soon.  After the fall, she was not able to get out of the bed.      Initial History of Present Illness:   Dr. Dias  This patient is a 74 y.o. female who presents today complaining of the above noted pain/s. The patient describes the pain as follows.  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  She reports approximately 2 years ago she had to sudden deaths in the family which caused very stressful time for her and resulted in shingles rash in the left V1 distribution however she reports having excruciating pain in the left V1 and V2 distributions.  She denies having difficulty with eating food and brushing her teeth on the left side of her face however the left lateral side of her nose gets her excruciating pain.  She has been tried on numerous medications in the past including gabapentin, Lyrica, Valtrex, Celebrex, ibuprofen which have all provided minimal benefit however steroids have been most helpful.  Today she rates her pain as an 8/10 and describes a constant burning sensation the left side of her face in addition to radiation into her bilateral lower extremities, her right shoulder, her left upper extremity occasionally as a pins and needle sensation.  She does report having numbness and weakness in her left lower extremity.  She has been physical therapy which she completed in February of 2019 however this caused most of her low back and leg symptoms to worsen in addition to her post herpetic neuralgia to worsen.  She denies having bowel bladder difficulties.  Her symptoms are worse with activity such as walking in her somewhat improved with rest however she had finds it difficult to get into a comfortable position. She has been using topical lidocaine patches and applying to the left side of her face which does provide significant benefit.  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    - pertinent negatives: No fever, No chills, No weight loss, No bladder dysfunction, No bowel dysfunction, No saddle anesthesia  - pertinent positives: generalized nonspecific Lower Extremity weakness bilaterally    - medications, other therapies tried (physical therapy, injections):     >> NSAIDs, Tylenol, Tramadol, Norco, gabapentin, lyrica, flexeril and medrol dose pack    >> Has previously undergone Physical Therapy      Pain injections:    Dr. Oshea:  -02/07/2024: C6-7 interlaminar epidural steroid injection with right paramedian approach  -10/25/2023: Sacrococcygeal joint injection with 80% relief  -07/28/2023: Bilateral sacroiliac joint and greater trochanteric bursa injection  -04/26/2023: bilateral L4/5 TFESI with 80% relief      -11/16/2021: Right-sided SI joint and greater trochanteric bursa injection; Dr. Fulton  -07/09/2020: Bilateral glenohumeral joint injection; Dr. Dias  -06/19/2020: Right-sided T12/L1 transforaminal epidural steroid injection; Dr. Dias  -03/10/2020:  T12/L1 transforaminal epidural steroid injection; Dr. Dias  -01/31/2020: Bilateral L3-5 medial branch blocks; Dr. Dias  -11/12/2019: Bilateral L4/5 transforaminal epidural steroid injection; Dr. Dias      Imaging / Labs / Studies (reviewed on 8/1/2024):    Thoracic X-rays: 4/22/24  XR THORACIC SPINE AP LATERAL     CLINICAL HISTORY:  Pain in thoracic spine     TECHNIQUE:  AP and lateral views of the thoracic spine     COMPARISON:  03/15/2022     FINDINGS:  There is mild dextrocurvature of the mid to lower thoracic spine.  The vertebral bodies demonstrate a normal height.  The disc space heights appear to be relatively well maintained. The pedicles are intact.  Minimal spondylosis noted at a few levels within the upper thoracic spine and lower thoracic spine.  There is significant disc space narrowing along with some spondylosis present at the L1-2 and L3-4.     Impression:     As above         Electronically signed by:David Wallace DO  Date:                                            04/22/2024  Time:       10/11/23    X-Ray Cervical Spine 5 View W Flex Extxt    Narrative    FINDINGS:  There is no acute fracture or compression deformity. Bones are demineralized. No aggressive lytic or blastic lesion seen.  Mild cervical dextrocurvature noted.  There is mild straightening of the normal cervical lordosis as well as mild anterolisthesis of C4 on C5.  No evidence of instability with flexion/extension.  Remaining alignment is unremarkable.  There is multilevel loss of intervertebral disc height with associated uncovertebral hypertrophy and endplate ossified ptosis, most prominent at C5-C6 and C6-C7.  More mild degenerative disc changes at C3-C4 and C4-C5. Multilevel bilateral facet arthropathy also noted. No gross bony spinal canal stenosis.  Moderate to severe left neuroforaminal stenosis at the C5-C6 and C6-C7 levels.  Additional mild neuroforaminal stenosis at the mid to upper cervical levels.  Left carotid calcifications noted.  Visualized soft tissues are otherwise unremarkable.    Impression  Mild cervical thoracic curvature and straightening of the normal cervical lordosis.  Mild anterolisthesis of C4 on C5 without evidence of instability.  Osteopenia and multilevel degenerative changes as above.      Sacrum/Coccyx X-rays:  EXAM: XR SACRUM AND COCCYX   HISTORY: Pain   FINDINGS:   3 views were obtained of the sacrum/coccyx.   Bilateral hip replacement hardware.  Degenerative changes within the visualized lumbar spine.  No visualized fracture.   The bones are osteopenic.      Impression:   No acute findings are identified.      CT Cervical spine 06/23/2023  FINDINGS:  There is unchanged grade 1 anterolisthesis of C3 on C4 and C4 on C5.  There is no new spondylolisthesis.  There is no loss of vertebral body height.  There is multilevel degenerative disc space narrowing most significant at C5-C6 and  C6-C7.  There is unchanged diffuse degenerative facet arthropathy with posterior disc osteophyte complexes and uncovertebral osteophytosis resulting in varying degrees of canal and foraminal stenosis.  This is incompletely evaluated on this exam.     The included portions of the posterior fossa are normal.  The craniocervical junction is symmetric.  The atlantoaxial articulation is normal.  The airway is widely patent.  The thyroid gland is normal.  There is no cervical adenopathy.  The visualized lung apices are clear.     Impression:     Multilevel degenerative spondylolisthesis and spondylosis resulting in varying degrees of canal and foraminal stenosis, grossly unchanged from prior exam.    CT lumbar spine 06/23/2023  FINDINGS:  Five non-rib-bearing lumbar type vertebral bodies are present.  There is mild Levoscoliotic curvature of the lumbar spine centered at L3, similar compared to the prior.     With regards to alignment, there is minimal right lateral listhesis of L1 on L2 and L2 on L3.  There is minimal left lateral listhesis of L3 on L4.  No anterolisthesis or retrolisthesis.     There are chronic compression deformities through the superior endplate of T12, inferior endplate of L1 and superior endplate of L2.  Degree of vertebral body height loss is stable since 02/07/2023.  Vertebral body heights from L3-L5 are preserved.     Mild to severe degenerative disc changes are present throughout the lumbar spine, worst at the right half of L3-L4 and L4-L5.  Partially calcified disc bulges are seen at L2-L3, L3-L4, L4-L5 and L5-S1.  Evaluation for central canal narrowing is limited without intrathecal contrast.  Degrees of central canal stenosis appear worst at L3-L4 and L4-L5.     Hypertrophic facet changes are present throughout the lumbar spine, worst at L3-L4 and L4-L5.     Paraspinal soft tissues appear within normal limits.  Both SI joints appear intact with mild degenerative changes, similar compared to  "the prior.     Impression:     1. Multilevel degenerative disc and hypertrophic facet changes similar compared to 02/07/2023.  2. Chronic appearing compression deformities at T12, L1 and L2, similar compared to 02/07/2023.  Dense of acute injury.      01/23/20    X-Ray Lumbar Spine Ap And Lateral  FINDINGS:  Levoscoliosis present.  Vertebral body heights stable.  Alignment unchanged without spondylolisthesis.  Similar appearing multilevel degenerative disc height loss and osteophyte findings noted.  Multilevel facet degenerative findings remain.  Aorta iliac atherosclerotic calcification.  Colonic constipation findings present.    EMG 02/24/2023  IMPRESSION  1. ABNORMAL study  2. 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  There is an acute on chronic radiculopathy of the LEFT C6 nerve root and a subacute on chronic of the RIGHT C4 and C5 nerve roots and a CHRONIC radiculopathy of bilateral C4-T1 nerve roots    Review of Systems:  CONSTITUTIONAL: patient denies any fever or  chills.   MUSCULOSKELETAL:  - patient complains of the above noted pain/s (see chief pain complaint)    NEUROLOGICAL:   - pain as above  - strength in Upper and Lower extremities is decreased, BILATERALLY  - sensation in Upper extremities is intact, BILATERALLY  - patient denies any loss of bowel or bladder control      PSYCHIATRIC: patient denies any change in mood    Other:  All other systems reviewed and are negative      Physical Exam:  /62   Pulse 71   Ht 5' 4" (1.626 m)   Wt 45.8 kg (101 lb)   LMP  (LMP Unknown)   BMI 17.34 kg/m²  (reviewed on 8/1/2024)  GENERAL:  Patient is in no acute distress, alert and oriented x3.  PSYCH:  Mood and affect appropriate.  SKIN: Skin " color normal.  HEAD/FACE:  Normocephalic, bruising along right lower jaw, Cranial nerves grossly intact.  GI:  Soft and non-tender.    Neck: TTP along cervical facet joints, positive facet loading,  pain with flexion, extension, and rotation. ROM decreased secondary to pain. 4/5 ricky strength bilateral upper extremities.  BACK: Straight leg raising in the sitting and supine positions is negative to radicular pain. pain to palpation over the facet joints of the lumbar spine or spinous processes.  Reduced range of motion with pain reproduction.  TTP diffusely over upper and lower back  EXTREMITIES:  Peripheral joint range of motion is reduced with pain with obvious instability in bilateral lower extremities.  No deformities, edema, or skin discoloration. Good capillary refill.  MUSCULOSKELETAL:  Unable to stand on heels and toes, patient in wheelchair today  hip, and knee provocative maneuvers are negative.      Trigger points felt in upper back - upper trap (reva), lower trap (right side) and reva lower lumbar spine.    NEURO: Bilateral upper and lower extremity coordination and muscle stretch reflexes are physiologic and symmetric. No loss of sensation is noted.  GAIT:  Patient presents in wheelchair today.  Antalgic gait.      Assessment:    Shireen Felix is a 78 y.o. year old female who is presenting with   Encounter Diagnoses   Name Primary?    Chronic pain disorder     Myofascial muscle pain Yes       Plan:    1. Interventional:  Trigger point injections performed in clinic today. See procedure note below.    Explained the risks and benefits of the procedure in detail with the patient today in clinic along with alternative treatment options, and the patient elected to pursue the intervention.        Anticoagulation:  Yes, Plavix  --secondary prophylaxis: Coronary artery disease, diastolic heart failure; cardiologist: Dr. Dye    2. Pharmacologic:      report:  Reviewed and consistent with medication use  as prescribed.      -No longer taking Gabapentin. Discontinued secondary to SE.   --Unable to take PO NSAIDs due to taking Plavix.    -Continue tizanidine 4-8 mg  BID prn pain.We have discussed potential deleterious side effects associated with this medication including  dizziness, drowsiness, dry mouth or tingling sensation in the upper or lower extremities.  Refill provided today.    3. Rehabilitative:   -We discussed continuing physician directed at home physical therapy to help manage the patient/s painful condition. The patient was counseled that muscle strengthening will improve the long term prognosis in regards to pain and may also help increase range of motion and mobility.   She will continue with  services at this time.      4. Diagnostic:  None at this time.    5. Consults: (PRN)  Continue follow up with PCP and specialists for hospital follow.    6.  Follow up: 3 months for repeat TPI. Patient will need an extended appointment for future visit.    Augustin Nichols PA-C  Interventional Pain Medicine        The above plan and management options were discussed at length with patient. Patient is in agreement with the above and verbalized understanding.  - I discussed the goals of interventional chronic pain management with the patient on today's visit. We discussed a multimodal and systematic approach to pain.  This includes diagnostic and therapeutic injections, adjuvant pharmacologic treatment, physical therapy, and at times psychiatry.  I emphasized the importance of regular exercise, core strengthening and stretching, diet and weight loss as a cornerstone of long-term pain management.    - This condition does not require this patient to take time off of work, and the primary goal of our Pain Management services is to improve the patient's functional capacity.  - Patient Questions: Answered all of the patient's questions regarding diagnoses, therapy, treatment and next steps      Procedure note for   Trigger Point Injection:   The procedure was discussed with the patient including complications of nerve damage,  bleeding, infection, and failure of pain relief.   Trigger points were identified by palpation and marked. Alcohol swab prep of sites done. A mixture of 5mL 1% lidocaine + 40 mg Depo-Medrol was prepared (6 mL total).   A 25-gauge needle was advanced to the point of maximal tenderness, and the medication mixture  was injected after negative aspiration. Patient tolerated the procedure well and without complications. Patient was monitored for 15 min following the injection before discharged from the clinic.  Sites injected included:   upper trap (reva), lower trap (right side) and reva lower lumbar spine (paraspinal ricky).    Please note, 12 mL total were used to cover these areas- 10 mL total of 1% lidocaine, 40 mg Depo Medrol.

## 2024-08-01 ENCOUNTER — TELEPHONE (OUTPATIENT)
Dept: INTERNAL MEDICINE | Facility: CLINIC | Age: 79
End: 2024-08-01
Payer: MEDICARE

## 2024-08-01 ENCOUNTER — OFFICE VISIT (OUTPATIENT)
Dept: PAIN MEDICINE | Facility: CLINIC | Age: 79
End: 2024-08-01
Payer: MEDICARE

## 2024-08-01 VITALS
DIASTOLIC BLOOD PRESSURE: 62 MMHG | WEIGHT: 101 LBS | HEART RATE: 71 BPM | HEIGHT: 64 IN | SYSTOLIC BLOOD PRESSURE: 101 MMHG | BODY MASS INDEX: 17.24 KG/M2

## 2024-08-01 DIAGNOSIS — I25.118 CORONARY ARTERY DISEASE OF NATIVE ARTERY OF NATIVE HEART WITH STABLE ANGINA PECTORIS: Primary | ICD-10-CM

## 2024-08-01 DIAGNOSIS — G89.4 CHRONIC PAIN DISORDER: ICD-10-CM

## 2024-08-01 DIAGNOSIS — M79.18 MYOFASCIAL MUSCLE PAIN: Primary | ICD-10-CM

## 2024-08-01 DIAGNOSIS — J45.50 SEVERE PERSISTENT ASTHMA WITHOUT COMPLICATION: ICD-10-CM

## 2024-08-01 DIAGNOSIS — Z86.73 HISTORY OF STROKE: ICD-10-CM

## 2024-08-01 PROCEDURE — 99999 PR PBB SHADOW E&M-EST. PATIENT-LVL IV: CPT | Mod: PBBFAC,,, | Performed by: PHYSICIAN ASSISTANT

## 2024-08-01 RX ORDER — TIZANIDINE 4 MG/1
4 TABLET ORAL EVERY 12 HOURS PRN
Qty: 60 TABLET | Refills: 1 | Status: SHIPPED | OUTPATIENT
Start: 2024-08-01

## 2024-08-01 RX ORDER — METHYLPREDNISOLONE ACETATE 40 MG/ML
40 INJECTION, SUSPENSION INTRA-ARTICULAR; INTRALESIONAL; INTRAMUSCULAR; SOFT TISSUE
Status: COMPLETED | OUTPATIENT
Start: 2024-08-01 | End: 2024-08-01

## 2024-08-01 RX ADMIN — METHYLPREDNISOLONE ACETATE 40 MG: 40 INJECTION, SUSPENSION INTRA-ARTICULAR; INTRALESIONAL; INTRAMUSCULAR; SOFT TISSUE at 10:08

## 2024-08-01 NOTE — TELEPHONE ENCOUNTER
----- Message from Racheal Raymundo sent at 8/1/2024  1:06 PM CDT -----  Contact: Shireen Iyer is needing a call back regarding having some question about her home health program. Please call back at [phone] or [phone].    Thank you summer

## 2024-08-01 NOTE — TELEPHONE ENCOUNTER
Call returned to , pt states that she is having uncontrolled diarrhea with fatigue and was advised at the hospital not to take imodium. She also states that the Home Health has ended and her insurance states that she is covered for 36 hours per week instead of 1 day per week. Pt requesting a referral to a different Home health

## 2024-08-02 ENCOUNTER — TELEPHONE (OUTPATIENT)
Dept: INTERNAL MEDICINE | Facility: CLINIC | Age: 79
End: 2024-08-02
Payer: MEDICARE

## 2024-08-06 ENCOUNTER — TELEPHONE (OUTPATIENT)
Dept: PULMONOLOGY | Facility: CLINIC | Age: 79
End: 2024-08-06
Payer: MEDICARE

## 2024-08-06 ENCOUNTER — TELEPHONE (OUTPATIENT)
Dept: CARDIOLOGY | Facility: CLINIC | Age: 79
End: 2024-08-06
Payer: MEDICARE

## 2024-08-06 ENCOUNTER — TELEPHONE (OUTPATIENT)
Dept: INTERNAL MEDICINE | Facility: CLINIC | Age: 79
End: 2024-08-06
Payer: MEDICARE

## 2024-08-13 ENCOUNTER — TELEPHONE (OUTPATIENT)
Dept: NEUROLOGY | Facility: CLINIC | Age: 79
End: 2024-08-13
Payer: MEDICARE

## 2024-08-13 NOTE — TELEPHONE ENCOUNTER
----- Message from Mariah Gonzalez sent at 8/13/2024  3:24 PM CDT -----  Contact: Shireen  Type:  Needs Medical Advice    Who Called: Jetty  Symptoms (please be specific): pain on right side of head    How long has patient had these symptoms:   about a week  Pharmacy name and phone #:    [facility] - [place] - [address]  [address]  [place] [address]  Phone: [phone] Fax: [phone]  Would the patient rather a call back or a response via [facility]?  Call back  Best Call Back Number: [phone]  Additional Information:  Patient states it not like a headache but its hard to describe.    Thanks   Am

## 2024-08-13 NOTE — TELEPHONE ENCOUNTER
Patient is calling her regards to her having right head sharp pain and would like you to run a test for her. Patient states she scared something is going on because of her health history. I have already scheduled patient for a follow up but she would like to get the ball rolling with test to be done.       Please advise.

## 2024-08-14 ENCOUNTER — TELEPHONE (OUTPATIENT)
Dept: NEUROLOGY | Facility: CLINIC | Age: 79
End: 2024-08-14
Payer: MEDICARE

## 2024-08-14 DIAGNOSIS — R51.9 NONINTRACTABLE EPISODIC HEADACHE, UNSPECIFIED HEADACHE TYPE: ICD-10-CM

## 2024-08-14 DIAGNOSIS — Z86.79 HISTORY OF CEREBRAL ANEURYSM REPAIR: Primary | ICD-10-CM

## 2024-08-14 DIAGNOSIS — Z98.890 HISTORY OF CEREBRAL ANEURYSM REPAIR: Primary | ICD-10-CM

## 2024-08-14 NOTE — TELEPHONE ENCOUNTER
----- Message from Susan Contreras NP sent at 8/14/2024  4:09 PM CDT -----  I ordered a CT of head  ----- Message -----  From: Enmanuel Gordon MA  Sent: 8/14/2024   1:21 PM CDT  To: Susan Contreras NP    I check and currently the appointment she is scheduled with you in September is the soonest. She was wanting to get an MRI or CT done in the meantime to make sure nothing abnormal is going on.  ----- Message -----  From: Susan Contreras NP  Sent: 8/14/2024   1:03 PM CDT  To: Enmanuel Vargas MA    You can see if someone else is able to see her sooner if she can't wait.  ----- Message -----  From: Enmanuel Gordon MA  Sent: 8/14/2024  11:37 AM CDT  To: Susan Contreras NP    Patient is following up on previous message sent.  ----- Message -----  From: Belinda Keyes  Sent: 8/14/2024  11:33 AM CDT  To: [facility] Staff    The pt is looking to get a call back in regards to the pain she has been having on the right side of her head. Also the appt she has is to far out and that's he was looking to get some test done. . Please give a call back at [phone]

## 2024-08-14 NOTE — TELEPHONE ENCOUNTER
----- Message from Nicole Godinez sent at 8/14/2024  3:34 PM CDT -----  Contact: sejal  Type:  Sooner Apoointment Request    Caller is requesting a sooner appointment.  Caller declined first available appointment listed below.  Caller will not accept being placed on the waitlist and is requesting a message be sent to doctor.  Name of Caller:sejal  When is the first available appointment?October  Symptoms:pain in head, right side/   Would the patient rather a call back or a response via [facility]? Call   Best Call Back Number:[phone]   Additional Information: patient stated she is never had this type of pain and is not like a normal hedache.

## 2024-08-20 ENCOUNTER — HOSPITAL ENCOUNTER (OUTPATIENT)
Dept: RADIOLOGY | Facility: HOSPITAL | Age: 79
Discharge: HOME | End: 2024-08-20
Attending: NURSE PRACTITIONER
Payer: MEDICARE

## 2024-08-20 DIAGNOSIS — Z86.79 HISTORY OF CEREBRAL ANEURYSM REPAIR: ICD-10-CM

## 2024-08-20 DIAGNOSIS — R51.9 NONINTRACTABLE EPISODIC HEADACHE, UNSPECIFIED HEADACHE TYPE: ICD-10-CM

## 2024-08-20 DIAGNOSIS — Z98.890 HISTORY OF CEREBRAL ANEURYSM REPAIR: ICD-10-CM

## 2024-08-20 PROCEDURE — 70450 CT HEAD/BRAIN W/O DYE: CPT | Mod: 26,,, | Performed by: RADIOLOGY

## 2024-08-20 PROCEDURE — 70450 CT HEAD/BRAIN W/O DYE: CPT | Mod: TC

## 2024-08-23 RX ORDER — PANTOPRAZOLE SODIUM 40 MG/1
TABLET, DELAYED RELEASE ORAL
Qty: 60 TABLET | Refills: 11 | Status: SHIPPED | OUTPATIENT
Start: 2024-08-23

## 2024-09-04 ENCOUNTER — OFFICE VISIT (OUTPATIENT)
Dept: ALLERGY | Facility: CLINIC | Age: 79
End: 2024-09-04
Payer: MEDICARE

## 2024-09-04 ENCOUNTER — OFFICE VISIT (OUTPATIENT)
Dept: CARDIOLOGY | Facility: CLINIC | Age: 79
End: 2024-09-04
Payer: MEDICARE

## 2024-09-04 VITALS
WEIGHT: 102.31 LBS | TEMPERATURE: 98 F | BODY MASS INDEX: 17.47 KG/M2 | SYSTOLIC BLOOD PRESSURE: 112 MMHG | OXYGEN SATURATION: 92 % | HEART RATE: 96 BPM | DIASTOLIC BLOOD PRESSURE: 72 MMHG | HEIGHT: 64 IN

## 2024-09-04 VITALS
SYSTOLIC BLOOD PRESSURE: 120 MMHG | DIASTOLIC BLOOD PRESSURE: 76 MMHG | OXYGEN SATURATION: 99 % | HEART RATE: 120 BPM | WEIGHT: 105.25 LBS | BODY MASS INDEX: 18.07 KG/M2

## 2024-09-04 DIAGNOSIS — R42 EPISODIC LIGHTHEADEDNESS: ICD-10-CM

## 2024-09-04 DIAGNOSIS — J45.50 SEVERE PERSISTENT ASTHMA WITHOUT COMPLICATION: Primary | ICD-10-CM

## 2024-09-04 DIAGNOSIS — Z86.73 HISTORY OF CVA (CEREBROVASCULAR ACCIDENT): ICD-10-CM

## 2024-09-04 DIAGNOSIS — I25.110 ATHEROSCLEROSIS OF NATIVE CORONARY ARTERY OF NATIVE HEART WITH UNSTABLE ANGINA PECTORIS: ICD-10-CM

## 2024-09-04 DIAGNOSIS — J31.0 CHRONIC NONALLERGIC RHINITIS: ICD-10-CM

## 2024-09-04 DIAGNOSIS — T50.905D ADVERSE EFFECT OF DRUG, SUBSEQUENT ENCOUNTER: ICD-10-CM

## 2024-09-04 DIAGNOSIS — I25.10 ATHEROSCLEROSIS OF NATIVE CORONARY ARTERY OF NATIVE HEART WITHOUT ANGINA PECTORIS: ICD-10-CM

## 2024-09-04 DIAGNOSIS — R93.1 LEFT VENTRICULAR EJECTION FRACTION LESS THAN 40%: ICD-10-CM

## 2024-09-04 DIAGNOSIS — R00.2 PALPITATIONS: Primary | ICD-10-CM

## 2024-09-04 DIAGNOSIS — Z98.890 HISTORY OF CORONARY ANGIOGRAM: ICD-10-CM

## 2024-09-04 DIAGNOSIS — J84.10 PULMONARY INTERSTITIAL FIBROSIS: ICD-10-CM

## 2024-09-04 DIAGNOSIS — I10 PRIMARY HYPERTENSION: ICD-10-CM

## 2024-09-04 PROCEDURE — 3288F FALL RISK ASSESSMENT DOCD: CPT | Mod: HCNC,CPTII,S$GLB, | Performed by: STUDENT IN AN ORGANIZED HEALTH CARE EDUCATION/TRAINING PROGRAM

## 2024-09-04 PROCEDURE — 3288F FALL RISK ASSESSMENT DOCD: CPT | Mod: HCNC,CPTII,S$GLB, | Performed by: INTERNAL MEDICINE

## 2024-09-04 PROCEDURE — 99999 PR PBB SHADOW E&M-EST. PATIENT-LVL V: CPT | Mod: PBBFAC,HCNC,, | Performed by: STUDENT IN AN ORGANIZED HEALTH CARE EDUCATION/TRAINING PROGRAM

## 2024-09-04 PROCEDURE — 1101F PT FALLS ASSESS-DOCD LE1/YR: CPT | Mod: HCNC,CPTII,S$GLB, | Performed by: INTERNAL MEDICINE

## 2024-09-04 PROCEDURE — 1126F AMNT PAIN NOTED NONE PRSNT: CPT | Mod: HCNC,CPTII,S$GLB, | Performed by: STUDENT IN AN ORGANIZED HEALTH CARE EDUCATION/TRAINING PROGRAM

## 2024-09-04 PROCEDURE — 1160F RVW MEDS BY RX/DR IN RCRD: CPT | Mod: HCNC,CPTII,S$GLB, | Performed by: STUDENT IN AN ORGANIZED HEALTH CARE EDUCATION/TRAINING PROGRAM

## 2024-09-04 PROCEDURE — 1126F AMNT PAIN NOTED NONE PRSNT: CPT | Mod: HCNC,CPTII,S$GLB, | Performed by: INTERNAL MEDICINE

## 2024-09-04 PROCEDURE — 3078F DIAST BP <80 MM HG: CPT | Mod: HCNC,CPTII,S$GLB, | Performed by: STUDENT IN AN ORGANIZED HEALTH CARE EDUCATION/TRAINING PROGRAM

## 2024-09-04 PROCEDURE — 3074F SYST BP LT 130 MM HG: CPT | Mod: HCNC,CPTII,S$GLB, | Performed by: STUDENT IN AN ORGANIZED HEALTH CARE EDUCATION/TRAINING PROGRAM

## 2024-09-04 PROCEDURE — 1159F MED LIST DOCD IN RCRD: CPT | Mod: HCNC,CPTII,S$GLB, | Performed by: STUDENT IN AN ORGANIZED HEALTH CARE EDUCATION/TRAINING PROGRAM

## 2024-09-04 PROCEDURE — 99214 OFFICE O/P EST MOD 30 MIN: CPT | Mod: HCNC,S$GLB,, | Performed by: INTERNAL MEDICINE

## 2024-09-04 PROCEDURE — 3072F LOW RISK FOR RETINOPATHY: CPT | Mod: HCNC,CPTII,S$GLB, | Performed by: STUDENT IN AN ORGANIZED HEALTH CARE EDUCATION/TRAINING PROGRAM

## 2024-09-04 PROCEDURE — 99215 OFFICE O/P EST HI 40 MIN: CPT | Mod: HCNC,S$GLB,, | Performed by: STUDENT IN AN ORGANIZED HEALTH CARE EDUCATION/TRAINING PROGRAM

## 2024-09-04 PROCEDURE — 3078F DIAST BP <80 MM HG: CPT | Mod: HCNC,CPTII,S$GLB, | Performed by: INTERNAL MEDICINE

## 2024-09-04 PROCEDURE — 99999 PR PBB SHADOW E&M-EST. PATIENT-LVL III: CPT | Mod: PBBFAC,HCNC,, | Performed by: INTERNAL MEDICINE

## 2024-09-04 PROCEDURE — 1101F PT FALLS ASSESS-DOCD LE1/YR: CPT | Mod: HCNC,CPTII,S$GLB, | Performed by: STUDENT IN AN ORGANIZED HEALTH CARE EDUCATION/TRAINING PROGRAM

## 2024-09-04 PROCEDURE — 3072F LOW RISK FOR RETINOPATHY: CPT | Mod: HCNC,CPTII,S$GLB, | Performed by: INTERNAL MEDICINE

## 2024-09-04 PROCEDURE — 3074F SYST BP LT 130 MM HG: CPT | Mod: HCNC,CPTII,S$GLB, | Performed by: INTERNAL MEDICINE

## 2024-09-04 RX ORDER — HYDROXYZINE HYDROCHLORIDE 25 MG/1
25 TABLET, FILM COATED ORAL NIGHTLY PRN
Qty: 30 TABLET | Refills: 11 | Status: SHIPPED | OUTPATIENT
Start: 2024-09-04 | End: 2025-09-04

## 2024-09-04 NOTE — ASSESSMENT & PLAN NOTE
- Overall doing well   - Continue current medications   - Educated on results of blood testing   - Will continue to monitor and reassess

## 2024-09-04 NOTE — ASSESSMENT & PLAN NOTE
- High risk patient with multiple co-morbidities   - Continue Tezspire, Trelegy, Airsupra, and Montelukast   - Educated on proper use of inhalers including an in-person demonstration of proper technique  - Expressed understanding of demonstrated technique  - ED precautions discussed at length   - Will continue to monitor and reassess

## 2024-09-04 NOTE — PROGRESS NOTES
Subjective:   Patient ID:  Shireen Felix is a 78 y.o. female who presents for evaluation of Shortness of Breath and Palpitations      Shortness of Breath  Associated symptoms include chest pain. Pertinent negatives include no syncope.   Palpitations   Associated symptoms include chest pain and shortness of breath. Pertinent negatives include no syncope.   9.4.2024  Overall looks better  States did some recent PT at home   Cardiac monitor non relevant for 18 days  Episodes of tachy, I suspect she gets panic attacks or anxiety attacks   She is on GUIDELINE DIRECTED MAXIMAL TOLERATED medical treatment for CHF  Euvolemic on exam     May 23, 2024.    Comes in for a follow-up after recent coronary angiogram.  She had nonobstructive disease.    Her filling pressures on the left were mildly elevated no evidence of severe pulmonary hypertension.    She followed  the Pulmonary for her mild pulmonary fibrosis.  No change in treatment.    She was satting above 97% on room air in the clinic today with a heart rate of 70.  EKG unchanged.  While asked to deep breathe on exam she all of the sudden started to complain of chest pain and near-syncope and tachycardia.    Her vital signs were unchanged.  With the heart rate of 70s and a pulse ox of 97%.        5.9.2024  78-year-old female, with history of CAD possible ischemic cardiomyopathy.  She had a stent she states more than 10 years ago possibly to the LAD.  Her LV EF has been in up and down with the past few years between 35 40% and 50%.    She also has pulmonary fibrosis moderate at least by CT scan.  She follows with Pulmonary.  However she states that her oxygen is 100% it does not drop with exertion.  She has to stop as soon as he walks due to chest heaviness radiating up to her neck.  This is happening with a very low level of activity CCS 3-4.  She has a an abundant amount of coronary artery calcification on her CT scan.    She is maxed out on antianginals almost.     Has dyspnea on exertion.  Was recently had or [place] for CHF.  Her BNP was elevated.    However today she looks good at rest.  Her lungs has minimal crackles possibly secondary to her long history of pulmonary fibrosis.    Past Medical History:   Diagnosis Date    Abnormal ECG 8/5/2019    Aneurysm     Anticoagulant long-term use     Arthritis     CAD in native artery 8/5/2019    COPD (chronic obstructive pulmonary disease)     Diabetes mellitus     Fall 10/10/2019    Formatting of this note might be different from the original. Found sitting on floor next to bed last night Mild confusion today Does not recall falling or how she ended up on floor UA today Labs yesterday unremarkable    Glaucoma     Hypertension     Seizures     Shingles 05/27/2017    Stroke        Past Surgical History:   Procedure Laterality Date    BRAIN SURGERY      CARDIAC CATHETERIZATION      CATHETERIZATION OF BOTH LEFT AND RIGHT HEART N/A 5/17/2024    Procedure: CATHETERIZATION, HEART, BOTH LEFT AND RIGHT;  Surgeon: Rachelle Sarabia MD;  Location: [facility] CATH LAB;  Service: Cardiology;  Laterality: N/A;    COLONOSCOPY N/A 09/21/2023    Procedure: COLONOSCOPY;  Surgeon: Joanna Leal MD;  Location: [facility] ENDO;  Service: Endoscopy;  Laterality: N/A;    CORONARY ANGIOPLASTY      EPIDURAL STEROID INJECTION INTO CERVICAL SPINE N/A 2/7/2024    Procedure: Cervical IL XANDER, Level C6/7, RN IV sedation;  Surgeon: Francisco Oshea MD;  Location: [facility] PAIN MGT;  Service: Pain Management;  Laterality: N/A;    EPIDURAL STEROID INJECTION INTO LUMBAR SPINE Bilateral 11/12/2019    Procedure: TF XANDER L4/5;  Surgeon: Desean Dias MD;  Location: [facility] PAIN MGT;  Service: Pain Management;  Laterality: Bilateral;    HYSTERECTOMY      INJECTION OF ANESTHETIC AGENT AROUND MEDIAL BRANCH NERVES INNERVATING LUMBAR FACET JOINT Bilateral 01/31/2020    Procedure: Bilateral L3-5 MBB;  Surgeon: Desean Dias MD;  Location: [facility] PAIN MGT;  Service: Pain  Management;  Laterality: Bilateral;    INJECTION OF ANESTHETIC AGENT INTO SACROILIAC JOINT Right 11/16/2021    Procedure: Right BLOCK, SACROILIAC JOINT Right GTB with RN IV sedation;  Surgeon: Yao Fulton MD;  Location: [facility] PAIN MGT;  Service: Pain Management;  Laterality: Right;    INJECTION OF ANESTHETIC AGENT INTO SACROILIAC JOINT Bilateral 07/28/2023    Procedure: Bilateral GT bursa + bilateral SIJ injection;  Surgeon: Francisco Oshea MD;  Location: [facility] PAIN MGT;  Service: Pain Management;  Laterality: Bilateral;    INJECTION OF JOINT Bilateral 07/09/2020    Procedure: Bilateral shoulder GH Joint injection with local;  Surgeon: Desean Dias MD;  Location: [facility] PAIN [facility]GT;  Service: Pain Management;  Laterality: Bilateral;    INJECTION OF JOINT Bilateral 07/28/2023    Procedure: Bilateral GT bursa + bilateral SIJ injection NEEDS ADDITIONAL SEDATION AND MORE TIME BEFORE INJECTION RN IV Sedation;  Surgeon: Francisco Oshea MD;  Location: [facility] PAIN [facility]GT;  Service: Pain Management;  Laterality: Bilateral;    INJECTION OF JOINT N/A 10/25/2023    Procedure: Sacrococcygeal joint injection;  Surgeon: Francisco Oshea MD;  [facility];  Service: Pain Management;  Laterality: N/A;    OOPHORECTOMY      SELECTIVE INJECTION OF ANESTHETIC AGENT AROUND LUMBAR SPINAL NERVE ROOT BY TRANSFORAMINAL APPROACH Bilateral 04/26/2023    Procedure: Bilateral L4/5 TF XANDER RN IV Sedation;  Surgeon: Francisco Oshea MD;  Location: [facility] PAIN [facility]GT;  Service: Pain Management;  Laterality: Bilateral;    TRANSFORAMINAL EPIDURAL INJECTION OF STEROID Bilateral 07/23/2019    Procedure: Bilateral L3/4 Transforaminal Epidural Steroid Injection;  Surgeon: Desean Dias MD;  Location: [facility]IN [facility]GT;  Service: Pain Management;  Laterality: Bilateral;    TRANSFORAMINAL EPIDURAL INJECTION OF STEROID Bilateral 03/10/2020    Procedure: Right T12/L1 TF XANDER with local;  Surgeon: Desean Dias MD;  Location: [facility]IN [facility]GT;  Service: Pain  Management;  Laterality: Bilateral;    TRANSFORAMINAL EPIDURAL INJECTION OF STEROID Right 2020    Procedure: Right T12/L1 TFESI Covid day of procedure;  Surgeon: Desean Dias MD;  Location: [facility];  Service: Pain Management;  Laterality: Right;       Social History     Tobacco Use    Smoking status: Former     Current packs/day: 0.00     Average packs/day: 1 pack/day for 42.3 years (42.3 ttl pk-yrs)     Types: Cigarettes     Start date:      Quit date: 2004     Years since quittin.4    Smokeless tobacco: Former   Substance Use Topics    Alcohol use: No    Drug use: Never       Family History   Problem Relation Name Age of Onset    No Known Problems Mother      No Known Problems Father      Breast cancer Maternal Aunt      Breast cancer Maternal Aunt      Breast cancer Maternal Aunt         Review of Systems   Cardiovascular:  Positive for chest pain, dyspnea on exertion and palpitations. Negative for syncope.   Respiratory:  Positive for shortness of breath.    Genitourinary: Negative.    Neurological: Negative.        Current Outpatient Medications on File Prior to Visit   Medication Sig    albuterol (PROVENTIL) 2.5 mg /3 mL (0.083 %) nebulizer solution Take 3 mLs (2.5 mg total) by nebulization every 4 (four) hours as needed for Wheezing. Rescue    albuterol (PROVENTIL/VENTOLIN HFA) 90 mcg/actuation inhaler Inhale 2 puffs into the lungs every 4 (four) hours as needed for Wheezing. Rescue    albuterol-budesonide (AIRSUPRA) 90-80 mcg/actuation Inhale 2 puffs into the lungs every 4 (four) hours as needed (wheezing, cough).    APTIOM 400 mg Tab tablet Take 400 mg by mouth.    atorvastatin (LIPITOR) 40 MG tablet Take 1 tablet (40 mg total) by mouth once daily.    bisoprolol (ZEBETA) 5 MG tablet Take 1 tablet (5 mg total) by mouth once daily.    blood sugar diagnostic Strp To check BG 1 times daily, to use with insurance preferred meter    blood-glucose meter kit To check BG 1 times daily,  to use with insurance preferred meter    cholecalciferol, vitamin D3, 1,250 mcg (50,000 unit) capsule Take 1 capsule (50,000 Units total) by mouth every 7 days.    cholestyramine (QUESTRAN) 4 gram packet TAKE 1 PACKET BY MOUTH TWICE DAILY AS DIRECTED    clopidogreL (PLAVIX) 75 mg tablet Take 1 tablet (75 mg total) by mouth once daily.    denosumab (PROLIA) 60 mg/mL Syrg every 6 (six) months.    dicyclomine (BENTYL) 10 MG capsule Take 1 capsule (10 mg total) by mouth 2 (two) times daily.    dorzolamide-timolol 2-0.5% (COSOPT) 22.3-6.8 mg/mL ophthalmic solution Place 1 drop into both eyes every 12 (twelve) hours.    empagliflozin (JARDIANCE) 10 mg tablet Take 1 tablet (10 mg total) by mouth once daily.    EScitalopram oxalate (LEXAPRO) 20 MG tablet Take 1 tablet (20 mg total) by mouth once daily.    fluconazole (DIFLUCAN) 150 MG Tab Take 1 tablet (150 mg total) by mouth once daily.    fluocinonide (LIDEX) 0.05 % external solution Apply topically 2 (two) times daily. Use on itchy spots on scalp and ear    fluticasone propionate (FLONASE) 50 mcg/actuation nasal spray 2 sprays (100 mcg total) by Each Nostril route once daily.    fluticasone-umeclidin-vilanter (TRELEGY ELLIPTA) 200-62.5-25 mcg inhaler Inhale 1 puff into the lungs once daily.    furosemide (LASIX) 20 MG tablet Take 1 tablet (20 mg total) by mouth daily as needed (for edema, swelling, fluid build up, or 3 pound weight gain in 24 hours).    gabapentin (NEURONTIN) 300 MG capsule Take 1 capsule (300 mg total) by mouth 3 (three) times daily.    HYDROcodone-acetaminophen (NORCO) 5-325 mg per tablet Take 1 tablet by mouth every 6 (six) hours as needed for Pain.    hydrOXYzine HCL (ATARAX) 25 MG tablet TAKE 1 TABLET BY MOUTH EVERY EVENING    ibuprofen (ADVIL,MOTRIN) 800 MG tablet Take 1 tablet (800 mg total) by mouth 3 (three) times daily.    ipratropium (ATROVENT) 42 mcg (0.06 %) nasal spray 2 sprays by Each Nostril route 2 (two) times daily.    isosorbide  mononitrate (IMDUR) 120 MG 24 hr tablet Take 1 tablet (120 mg total) by mouth every evening.    ketoconazole (NIZORAL) 2 % cream Apply topically 2 (two) times daily. For dry flaky patches of ear.    lancets Misc To check BG 1 times daily, to use with insurance preferred meter    latanoprost 0.005 % ophthalmic solution Place 1 drop into both eyes every evening.    levETIRAcetam (KEPPRA) 500 MG Tab TAKE 1 TABLET BY MOUTH TWICE DAILY    levETIRAcetam (KEPPRA) 750 MG Tab Take 1 tablet (750 mg total) by mouth 2 (two) times daily.    linezolid (ZYVOX) 600 mg Tab Take 1 tablet (600 mg total) by mouth every 12 (twelve) hours.    magnesium oxide (MAG-OX) 400 mg (241.3 mg magnesium) tablet Take 1 tablet (400 mg total) by mouth once daily.    montelukast (SINGULAIR) 10 mg tablet Take 1 tablet (10 mg total) by mouth once daily.    montelukast (SINGULAIR) 10 mg tablet Take 1 tablet (10 mg total) by mouth once daily.    mucus clearing device (AEROBIKA OSCILLATING PEP SYSTM) by Misc.(Non-Drug; Combo Route) route 4 (four) times daily.    nitroGLYCERIN (NITROSTAT) 0.4 MG SL tablet Place 1 tablet (0.4 mg total) under the tongue every 5 (five) minutes as needed for Chest pain.    omeprazole (PRILOSEC) 40 MG capsule Take 1 capsule (40 mg total) by mouth every morning.    ondansetron (ZOFRAN-ODT) 4 MG TbDL Take 1 tablet (4 mg total) by mouth every 6 (six) hours as needed.    pantoprazole (PROTONIX) 40 MG tablet TAKE 1 TABLET BY MOUTH TWICE DAILY    potassium chloride SA (K-DUR,KLOR-CON) 20 MEQ tablet Take 1 tablet (20 mEq total) by mouth once daily. Take extra dose with Lasix - fluid pill    predniSONE (DELTASONE) 10 MG tablet Take 1 tablet (10 mg total) by mouth once daily.    ranolazine (RANEXA) 1,000 mg Tb12 Take 1 tablet (1,000 mg total) by mouth 2 (two) times daily.    roflumilast (DALIRESP) 500 mcg Tab TAKE ONE TABLET BY MOUTH DAILY    sacubitriL-valsartan (ENTRESTO) 24-26 mg per tablet Take 1 tablet by mouth 2 (two) times daily.     sodium chloride 3% 3 % nebulizer solution Take 4 mLs by nebulization as needed for Cough (chest congestion).    spironolactone (ALDACTONE) 50 MG tablet Take 1 tablet (50 mg total) by mouth once daily.    tezepelumab-ekko 210 mg/1.91 mL (110 mg/mL) PnIj Inject 210 mg into the skin every 28 days.    tiZANidine (ZANAFLEX) 4 MG tablet Take 1 tablet (4 mg total) by mouth every 12 (twelve) hours as needed.    triamcinolone acetonide 0.025% (KENALOG) 0.025 % cream Apply topically 2 (two) times daily. PRN rash and itching of ear. Mild steroid cream.    MYRBETRIQ 50 mg Tb24 TAKE 1 TABLET BY MOUTH ONCE DAILY (Patient not taking: Reported on 5/8/2024)     No current facility-administered medications on file prior to visit.       Objective:   Objective:  Wt Readings from Last 3 Encounters:   09/04/24 47.7 kg (105 lb 4.3 oz)   08/01/24 45.8 kg (101 lb)   07/17/24 46.5 kg (102 lb 8.2 oz)     Temp Readings from Last 3 Encounters:   07/17/24 98.4 °F (36.9 °C) (Oral)   06/11/24 97.7 °F (36.5 °C)   05/18/24 97.8 °F (36.6 °C) (Oral)     BP Readings from Last 3 Encounters:   09/04/24 120/76   08/01/24 101/62   07/17/24 100/60     Pulse Readings from Last 3 Encounters:   09/04/24 (!) 120   08/01/24 71   07/17/24 74       Physical Exam  Vitals reviewed.   Constitutional:       Appearance: She is well-developed.   Neck:      Vascular: No carotid bruit.   Cardiovascular:      Rate and Rhythm: Normal rate and regular rhythm.      Pulses: Intact distal pulses.      Heart sounds: Normal heart sounds. No murmur heard.  Pulmonary:      Breath sounds: Rales present.   Neurological:      Mental Status: She is oriented to person, place, and time.         Lab Results   Component Value Date    CHOL 112 (L) 07/12/2024    CHOL 158 05/02/2024    CHOL 142 04/25/2024     Lab Results   Component Value Date    HDL 41 07/12/2024    HDL 48 05/02/2024    HDL 56 04/25/2024     Lab Results   Component Value Date    LDLCALC 49.8 (L) 07/12/2024    LDLCALC 87.6  05/02/2024    LDLCALC 72 04/25/2024     Lab Results   Component Value Date    TRIG 106 07/12/2024    TRIG 112 05/02/2024    TRIG 72 04/25/2024     Lab Results   Component Value Date    CHOLHDL 36.6 07/12/2024    CHOLHDL 30.4 05/02/2024    CHOLHDL 47.9 08/31/2023       Chemistry        Component Value Date/Time     (L) 07/12/2024 1204    K 3.7 07/12/2024 1204     07/12/2024 1204    CO2 17 (L) 07/12/2024 1204    BUN 19 07/12/2024 1204    CREATININE 1.2 07/12/2024 1204     07/12/2024 1204        Component Value Date/Time    CALCIUM 8.7 07/12/2024 1204    ALKPHOS 59 07/12/2024 1204    AST 15 07/12/2024 1204    ALT 9 (L) 07/12/2024 1204    BILITOT 0.3 07/12/2024 1204    ESTGFRAFRICA 74 06/22/2024 0458    ESTGFRAFRICA >60 06/02/2022 1549    EGFRNONAA >60 06/02/2022 1549          Lab Results   Component Value Date    TSH 1.004 09/15/2023     Lab Results   Component Value Date    INR 1.2 05/08/2024    INR 1.0 11/05/2020    INR 1.0 08/20/2020     Lab Results   Component Value Date    WBC 7.78 07/12/2024    HGB 11.8 (L) 07/12/2024    HCT 39.8 07/12/2024    MCV 99 (H) 07/12/2024     07/12/2024     BNP  @LABRCNTIP(BNP,BNPTRIAGEBLO)@  CrCl cannot be calculated (Patient's most recent lab result is older than the maximum 7 days allowed.).     Imaging:  ======    No results found for this or any previous visit.    Results for orders placed during the hospital encounter of 06/21/23    US Carotid Bilateral    Narrative  EXAMINATION:  US CAROTID BILATERAL    TECHNIQUE:  Grayscale and color Doppler ultrasound examination of the carotid and vertebral artery systems bilaterally.  Stenosis estimates are per the NASCET measurement criteria.    COMPARISON:  None.    FINDINGS:  Right:    Internal Carotid Artery (ICA) peak systolic velocity 69 cm/sec    ICA/CCA peak systolic velocity ratio: 1.0    Plaque formation: Minimal    Vertebral artery: Antegrade flow and normal waveform.    Left:    Internal Carotid Artery  (ICA)  peak systolic velocity 62 cm/sec    ICA/CCA peak systolic velocity ratio: 0.8    Plaque formation: Minimal    Vertebral artery: Antegrade flow and normal waveform.    Impression  No evidence of a hemodynamically significant carotid bifurcation stenosis.      Electronically signed by: Kristopher Velasquez MD  Date:    06/21/2023  Time:    15:13    Results for orders placed during the hospital encounter of 04/15/24    X-Ray Chest PA And Lateral    Narrative  EXAMINATION:  XR CHEST PA AND LATERAL    CLINICAL HISTORY:  Cough, unspecified    TECHNIQUE:  PA and lateral views of the chest were performed.    COMPARISON:  04/05/2024    FINDINGS:  The cardiac and mediastinal silhouettes appear within normal limits.   Diffuse coarsening of the interstitial markings remains unchanged from multiple prior examinations.  Underlying interstitial lung disease not excluded.  No focal parenchymal consolidation or definite pleural effusion demonstrated.  No acute osseous findings demonstrated.    Impression  Unchanged appearance of the chest as above      Electronically signed by: Neri Husain MD  Date:    04/15/2024  Time:    10:33    No results found for this or any previous visit.    No valid procedures specified.    No results found for this or any previous visit.      Results for orders placed during the hospital encounter of 06/02/23    Nuclear Stress - Cardiology Interpreted    Interpretation Summary    Normal myocardial perfusion scan. There is no evidence of myocardial ischemia or infarction.    The gated perfusion images showed an ejection fraction of 54% at rest. The gated perfusion images showed an ejection fraction of 62% post stress.    There is normal wall motion at rest and post stress.    LV cavity size is normal at rest and normal at stress.    The ECG portion of the study is negative for ischemia.    The patient reported no chest pain during the stress test.      Results for orders placed during the hospital  encounter of 06/02/23    Echo    Interpretation Summary  · The left ventricle is normal in size with concentric hypertrophy and low normal systolic function.  · The estimated ejection fraction is 50%.  · Normal left ventricular diastolic function.  · There is abnormal septal wall motion consistent with left bundle branch block.  · Normal right ventricular size with normal right ventricular systolic function.  · Normal central venous pressure (3 mmHg).  · The estimated PA systolic pressure is 30 mmHg.  · Mild mitral regurgitation.       CONCLUSIONS: 4.2024  1. Normal left ventricular cavity size. Moderately depressed left ventricular systolic   function. LVEF 35 - 40%.   2. Normal right ventricular size and systolic function.   3. The left atrium is normal in size.   4. Normal right atrial size and morphology.   5. Mild mitral valve regurgitation. Mitral valve leaflets appear mildly thickened.   6. Aortic valve cusps appear mildly calcified. AV peak PG 13 mmHg. AV Mean PG 7 mmHg. OLIVIA   (VTI) 1.38 cm2.   7. No or trivial tricuspid valve regurgitation.   8. Mild pulmonic valve regurgitation.   9. Normal pericardium without evidence of pericardial effusion.   10. There is mild atherosclerosis visualized in the abdominal aorta by limited views.     Results for orders placed during the hospital encounter of 05/17/24    Cardiac catheterization    Conclusion    The pre-procedure left ventricular end diastolic pressure was 18.    The estimated blood loss was none.    There was non-obstructive coronary artery disease..    The filling pressures on the left were mildly elevated.    Normal pulmonary artery pressures    The procedure log was documented by Documenter: Chrissy De La Torre RN and verified by Rachelle Sarabia MD.    Date: 5/17/2024  Time: 10:28 AM      Diagnostic Results:  ECG: Reviewed      Interpretation Summary  Show Result Comparison     The predominant rhythm is sinus.     The patient was monitored for a total  of 17d 22h, underlying rhythm is Sinus.  QRS morphology changes were noted.  The minimum heart rate was 62 bpm; the maximum 106 bpm; the average 73 bpm.  0 % of Atrial fibrillation/Atrial flutter with longest episode of 0 ms.  The total burden of AV Block present was 0 % [Complete Heart Block: 0 %; Advanced (High Grade):  0 %; 2nd Degree, Mobitz II: 0 %; 2nd Degree, Mobitz I: 0 %].  There were 0 pauses, the longest pause was 0 ms at --.  Total count of Ventricular Tachycardia (VT): 0 episode(s). Longest VT: 0 s on --. Fastest VT: -- bpm on  --.  6 supraventricular episodes were found. Longest SVT Episode 6 beats, Fastest  bpm  There were a total of 12 PVCs with 1 morphologies and 2 couplets. Overall PVC [place] at < 0.01 %  There were a total of 0 Other Beats. There were 0 total number of paced beats.  There were a total of 235 PSVCs with 1 morphologies and 0 couplets. Overall PSVC [place] at  0.03 %  There is a total of 0 patient events       The ASCVD Risk score (Kusum CASTILLO, et al., 2019) failed to calculate for the following reasons:    The patient has a prior MI or stroke diagnosis        Assessment and Plan:   Palpitations    History of CVA (cerebrovascular accident)    Primary hypertension    History of coronary angiogram    Atherosclerosis of native coronary artery of native heart without angina pectoris    Episodic lightheadedness    Atherosclerosis of native coronary artery of native heart with unstable angina pectoris    Left ventricular ejection fraction less than 40%    Pulmonary interstitial fibrosis        Continue with Plavix.  Continue with Imdur 120.  Continue with the Ranexa.    On gdmt including bb (BISOPROLOL) , entresto , spironolactone, imdur , lasix, Jardiance  Episodic Dyspnea disproportionate to her mild pulmonary fibrosis, normal O2 sat on room air and only mildly elevated filling pressures on recent heart catheterization.    No evidence of critical CAD to explain also her symptoms.     Non relevant 14 days monitor   follow with primary care for possible anxiety versus panic attacks.  Reviewed coronary angiogram in the past visit, non obs  Reviewed all tests and above medical conditions with patient in detail and formulated treatment plan.  Risk factor modification discussed.   Cardiac low salt diet discussed.  Maintaining healthy weight and weight loss goals were discussed in clinic.    Follow up in 6 months

## 2024-09-04 NOTE — PROGRESS NOTES
Allergy and Immunology  Established Patient Clinic Note    Date: 9/4/2024  Chief Complaint   Patient presents with    Itching     Pt wants to know if she can be given something for itching.      History  Shireen Felix is a 78 y.o. female being seen for follow-up today.    Chronic Non-Allergic Rhinitis   - Patient with an interval improvement reported since prior appointment      Severe Persistent Asthma/COPD  Pulmonary Fibrosis     - Overall improvement in symptoms and QOL since starting Tezspire   - [place]rus noting patient's overall improvement is due to multidisciplinary interventions  - Patient to continue inhalers and Tezspire at this time      Prior HPI:   - Onset: Asthma appears to have began in childhood   - Hx of smoking with COPD and also pulmonary fibrosis   - Multifactorial disease affecting quality of life   - Patient with multiple admissions and oral steroids for resp exacerbation   - Not controled despite inhalers and escalation to biologics indicated  - Eosinophils zero but on steroids      Drug Allergy  - PCN/Bactrim to be discussed at next appointment  - Planning for potential oral challenge after discussion of risk v benefits     Allergies, PMH, PSH, Social, and Family History were reviewed.    Current Outpatient Medications on File Prior to Visit   Medication Sig Dispense Refill    albuterol (PROVENTIL) 2.5 mg /3 mL (0.083 %) nebulizer solution Take 3 mLs (2.5 mg total) by nebulization every 4 (four) hours as needed for Wheezing. Rescue 180 mL 11    albuterol-budesonide (AIRSUPRA) 90-80 mcg/actuation Inhale 2 puffs into the lungs every 4 (four) hours as needed (wheezing, cough). 10.7 g 11    APTIOM 400 mg Tab tablet Take 400 mg by mouth.      atorvastatin (LIPITOR) 40 MG tablet Take 1 tablet (40 mg total) by mouth once daily. 90 tablet 1    bisoprolol (ZEBETA) 5 MG tablet Take 1 tablet (5 mg total) by mouth once daily. 30 tablet 11    blood sugar diagnostic Strp To check BG 1 times daily, to  use with insurance preferred meter 100 strip 3    blood-glucose meter kit To check BG 1 times daily, to use with insurance preferred meter 1 each 0    cholecalciferol, vitamin D3, 1,250 mcg (50,000 unit) capsule Take 1 capsule (50,000 Units total) by mouth every 7 days. 12 capsule 0    cholestyramine (QUESTRAN) 4 gram packet TAKE 1 PACKET BY MOUTH TWICE DAILY AS DIRECTED 60 packet 11    clopidogreL (PLAVIX) 75 mg tablet Take 1 tablet (75 mg total) by mouth once daily. 90 tablet 3    denosumab (PROLIA) 60 mg/mL Syrg every 6 (six) months.      dicyclomine (BENTYL) 10 MG capsule Take 1 capsule (10 mg total) by mouth 2 (two) times daily. 10 capsule 0    dorzolamide-timolol 2-0.5% (COSOPT) 22.3-6.8 mg/mL ophthalmic solution Place 1 drop into both eyes every 12 (twelve) hours. 10 mL 3    empagliflozin (JARDIANCE) 10 mg tablet Take 1 tablet (10 mg total) by mouth once daily. 90 tablet 3    EScitalopram oxalate (LEXAPRO) 20 MG tablet Take 1 tablet (20 mg total) by mouth once daily. 90 tablet 2    fluconazole (DIFLUCAN) 150 MG Tab Take 1 tablet (150 mg total) by mouth once daily. 2 tablet 0    fluocinonide (LIDEX) 0.05 % external solution Apply topically 2 (two) times daily. Use on itchy spots on scalp and ear 60 mL 2    fluticasone propionate (FLONASE) 50 mcg/actuation nasal spray 2 sprays (100 mcg total) by Each Nostril route once daily. 16 g 11    fluticasone-umeclidin-vilanter (TRELEGY ELLIPTA) 200-62.5-25 mcg inhaler Inhale 1 puff into the lungs once daily. 60 each 11    furosemide (LASIX) 20 MG tablet Take 1 tablet (20 mg total) by mouth daily as needed (for edema, swelling, fluid build up, or 3 pound weight gain in 24 hours). 30 tablet 11    gabapentin (NEURONTIN) 300 MG capsule Take 1 capsule (300 mg total) by mouth 3 (three) times daily. 90 capsule 1    HYDROcodone-acetaminophen (NORCO) 5-325 mg per tablet Take 1 tablet by mouth every 6 (six) hours as needed for Pain.      hydrOXYzine HCL (ATARAX) 25 MG tablet TAKE  1 TABLET BY MOUTH EVERY EVENING 30 tablet 2    ibuprofen (ADVIL,MOTRIN) 800 MG tablet Take 1 tablet (800 mg total) by mouth 3 (three) times daily. 60 tablet 0    ipratropium (ATROVENT) 42 mcg (0.06 %) nasal spray 2 sprays by Each Nostril route 2 (two) times daily. 15 mL 11    isosorbide mononitrate (IMDUR) 120 MG 24 hr tablet Take 1 tablet (120 mg total) by mouth every evening. 90 tablet 3    ketoconazole (NIZORAL) 2 % cream Apply topically 2 (two) times daily. For dry flaky patches of ear. 30 g 1    lancets [facility] To check BG 1 times daily, to use with insurance preferred meter 100 each 3    latanoprost 0.005 % ophthalmic solution Place 1 drop into both eyes every evening. 2.5 mL 3    levETIRAcetam (KEPPRA) 500 MG Tab TAKE 1 TABLET BY MOUTH TWICE DAILY 60 tablet 2    levETIRAcetam (KEPPRA) 750 MG Tab Take 1 tablet (750 mg total) by mouth 2 (two) times daily. 60 tablet 11    linezolid (ZYVOX) 600 mg Tab Take 1 tablet (600 mg total) by mouth every 12 (twelve) hours. 14 tablet 0    magnesium oxide (MAG-OX) 400 mg (241.3 mg magnesium) tablet Take 1 tablet (400 mg total) by mouth once daily. 90 tablet 1    montelukast (SINGULAIR) 10 mg tablet Take 1 tablet (10 mg total) by mouth once daily. 90 tablet 3    montelukast (SINGULAIR) 10 mg tablet Take 1 tablet (10 mg total) by mouth once daily. 90 tablet 3    mucus clearing device (AEROBIKA OSCILLATING PEP SYSTM) by Misc.(Non-Drug; Combo Route) route 4 (four) times daily. 1 each 0    nitroGLYCERIN (NITROSTAT) 0.4 MG SL tablet Place 1 tablet (0.4 mg total) under the tongue every 5 (five) minutes as needed for Chest pain. 100 tablet 0    omeprazole (PRILOSEC) 40 MG capsule Take 1 capsule (40 mg total) by mouth every morning. 90 capsule 3    ondansetron (ZOFRAN-ODT) 4 MG TbDL Take 1 tablet (4 mg total) by mouth every 6 (six) hours as needed. 30 tablet 0    pantoprazole (PROTONIX) 40 MG tablet TAKE 1 TABLET BY MOUTH TWICE DAILY 60 tablet 11    potassium chloride SA  (K-DUR,KLOR-CON) 20 MEQ tablet Take 1 tablet (20 mEq total) by mouth once daily. Take extra dose with Lasix - fluid pill 90 tablet 1    predniSONE (DELTASONE) 10 MG tablet Take 1 tablet (10 mg total) by mouth once daily. 30 tablet 0    ranolazine (RANEXA) 1,000 mg Tb12 Take 1 tablet (1,000 mg total) by mouth 2 (two) times daily. 180 tablet 3    roflumilast (DALIRESP) 500 mcg Tab TAKE ONE TABLET BY MOUTH DAILY 90 tablet 3    sacubitriL-valsartan (ENTRESTO) 24-26 mg per tablet Take 1 tablet by mouth 2 (two) times daily. 180 tablet 3    sodium chloride 3% 3 % nebulizer solution Take 4 mLs by nebulization as needed for Cough (chest congestion). 360 mL 11    spironolactone (ALDACTONE) 50 MG tablet Take 1 tablet (50 mg total) by mouth once daily. 90 tablet 1    tezepelumab-ekko 210 mg/1.91 mL (110 mg/mL) PnIj Inject 210 mg into the skin every 28 days. 1.91 mL 11    tiZANidine (ZANAFLEX) 4 MG tablet Take 1 tablet (4 mg total) by mouth every 12 (twelve) hours as needed. 60 tablet 1    triamcinolone acetonide 0.025% (KENALOG) 0.025 % cream Apply topically 2 (two) times daily. PRN rash and itching of ear. Mild steroid cream. 30 g 0    [DISCONTINUED] albuterol (PROVENTIL/VENTOLIN HFA) 90 mcg/actuation inhaler Inhale 2 puffs into the lungs every 4 (four) hours as needed for Wheezing. Rescue 18 g 11    MYRBETRIQ 50 mg Tb24 TAKE 1 TABLET BY MOUTH ONCE DAILY (Patient not taking: Reported on 5/8/2024) 30 tablet 0     No current facility-administered medications on file prior to visit.     Physical Examination  Vitals:    09/04/24 1021   BP: 112/72   Pulse: 96   Temp: 97.9 °F (36.6 °C)     GENERAL:  female in no apparent distress and well developed and well nourished  HEAD:  Normocephalic, without obvious abnormality, atraumatic  EYES: sclera anicteric, conjunctiva normochromic  EARS: normal TM's and external ear canals both ears  NOSE: without erythema or discharge, clear discharge, turbinates normal    OROPHARYNX: moist mucous  membranes without erythema, exudates or petechiae  LYMPH NODES: normal, supple, no lymphadenopathy  LUNGS: Crackles without wheezing  HEART: normal rate, regular rhythm, normal S1, S2, no murmurs, rubs, clicks or gallops.  ABDOMEN: soft, nontender, nondistended, no masses or organomegaly.  MUSCULOSKELETAL: no gross joint deformity or swelling.  NEURO: alert, oriented, normal speech, no focal findings or movement disorder noted.  SKIN: normal coloration and turgor, no rashes, no suspicious skin lesions noted.     Assessment/Plan:   Problem List Items Addressed This Visit          ENT    Chronic nonallergic rhinitis    Overview     - 04/29/2024: Serum IgE to Zone 6 Aeroallergens negative          Current Assessment & Plan     - Overall doing well   - Continue current medications   - Educated on results of blood testing   - Will continue to monitor and reassess             Pulmonary    Severe persistent asthma without complication - Primary    Current Assessment & Plan     - High risk patient with multiple co-morbidities   - Continue Tezspire, Trelegy, Airsupra, and Montelukast   - Educated on proper use of inhalers including an in-person demonstration of proper technique  - Expressed understanding of demonstrated technique  - ED precautions discussed at length   - Will continue to monitor and reassess            Other    Drug reaction    Overview     - PCN/Bactrim to be discussed at next appointment  - Planning for potential oral challenge after discussion of risk v benefits           Follow up:  Follow up in about 3 months (around 12/4/2024).    Complex patient requiring additional time for management.     LOS NUMBER AND COMPLEXITY OF PROBLEMS    COMPLEXITY OF DATA RISK TOTAL TIME (m)   46015  84000 [] 1 self-limited or minor problem [] Minimal to none [] No treatment recommended or patient to monitor 15-29  10-19   95362  22851 Low  [] 2 or > self limited or minor problems  [] 1 stable chronic illness  [] 1 acute,  uncomplicated illness or injury Limited (2)  [] Prior external notes from each unique source  [] Review result of each unique test  [] Order each unique test []  Low  OTC medications, minor skin biopsy 30-44  20-29   58119  14464 Moderate  []  1 or > chronic illness with progression, exacerbation or SE of treatment  []  2 or more stable chronic illnesses  []  1 acute illness with systemic symptoms  []  1 acute complicated injury  []  1 undiagnosed new problem with uncertain prognosis Moderate (1/3 below)  []  3 or more data items        *Now includes assessment requiring independent historian  []  Independent interpretation of a test  []  Discuss management/test with another provider Moderate  []  Prescription drug mgmt  []  Minor surgery with risk discussed  []  Mgmt limited by social determinates 45-59  30-39   96371  44840 High  []  1 or more chronic illness with severe exacerbation, progression or SE of treatment  [x]  1 acute or chronic illness/injury that poses a threat to life or bodily function Extensive (2/3 below)  [x]  3 or more data items        *Now includes assessment requiring independent historian.  []  Independent interpretation of a test  []  Discuss management/test with another provider High  []  Major surgery with risk discussed  []  Drug therapy requiring intensive monitoring for toxicity  []  Hospitalization  []  Decision for DNR 60-74  40-54      Rajani Irwin MD   [facility]  Allergy and Immunology

## 2024-09-20 NOTE — Clinical Note
The catheter was inserted over the wire into the ostium   left main. An angiography was performed of the left coronary arteries. Multiple views were taken. Standing/Walking/Toileting/Moving from bed to chair

## 2024-09-26 ENCOUNTER — PATIENT OUTREACH (OUTPATIENT)
Dept: ADMINISTRATIVE | Facility: HOSPITAL | Age: 79
End: 2024-09-26
Payer: MEDICARE

## 2024-09-26 ENCOUNTER — OFFICE VISIT (OUTPATIENT)
Dept: NEUROLOGY | Facility: CLINIC | Age: 79
End: 2024-09-26
Payer: MEDICARE

## 2024-09-26 ENCOUNTER — OFFICE VISIT (OUTPATIENT)
Dept: OPHTHALMOLOGY | Facility: CLINIC | Age: 79
End: 2024-09-26
Payer: MEDICARE

## 2024-09-26 ENCOUNTER — LAB VISIT (OUTPATIENT)
Dept: LAB | Facility: HOSPITAL | Age: 79
End: 2024-09-26
Attending: NURSE PRACTITIONER
Payer: MEDICARE

## 2024-09-26 VITALS
RESPIRATION RATE: 16 BRPM | BODY MASS INDEX: 17.56 KG/M2 | DIASTOLIC BLOOD PRESSURE: 62 MMHG | HEIGHT: 64 IN | SYSTOLIC BLOOD PRESSURE: 107 MMHG | HEART RATE: 81 BPM

## 2024-09-26 DIAGNOSIS — Z96.1 PSEUDOPHAKIA OF BOTH EYES: ICD-10-CM

## 2024-09-26 DIAGNOSIS — M79.18 MYOFASCIAL MUSCLE PAIN: ICD-10-CM

## 2024-09-26 DIAGNOSIS — L29.9 ITCHING: ICD-10-CM

## 2024-09-26 DIAGNOSIS — H04.129 DRY EYE: ICD-10-CM

## 2024-09-26 DIAGNOSIS — H40.1132 PRIMARY OPEN ANGLE GLAUCOMA (POAG) OF BOTH EYES, MODERATE STAGE: Primary | ICD-10-CM

## 2024-09-26 DIAGNOSIS — G40.109 TEMPORAL LOBE SEIZURE: ICD-10-CM

## 2024-09-26 DIAGNOSIS — G89.4 CHRONIC PAIN DISORDER: ICD-10-CM

## 2024-09-26 DIAGNOSIS — G40.109 TEMPORAL LOBE SEIZURE: Primary | ICD-10-CM

## 2024-09-26 DIAGNOSIS — E11.9 DIABETES MELLITUS TYPE 2 WITHOUT RETINOPATHY: ICD-10-CM

## 2024-09-26 LAB
ALBUMIN SERPL BCP-MCNC: 3.3 G/DL (ref 3.5–5.2)
ALP SERPL-CCNC: 52 U/L (ref 55–135)
ALT SERPL W/O P-5'-P-CCNC: 6 U/L (ref 10–44)
ANION GAP SERPL CALC-SCNC: 9 MMOL/L (ref 8–16)
AST SERPL-CCNC: 14 U/L (ref 10–40)
BASOPHILS # BLD AUTO: 0.03 K/UL (ref 0–0.2)
BASOPHILS NFR BLD: 0.3 % (ref 0–1.9)
BILIRUB SERPL-MCNC: 0.4 MG/DL (ref 0.1–1)
BUN SERPL-MCNC: 23 MG/DL (ref 8–23)
CALCIUM SERPL-MCNC: 9.3 MG/DL (ref 8.7–10.5)
CHLORIDE SERPL-SCNC: 106 MMOL/L (ref 95–110)
CO2 SERPL-SCNC: 23 MMOL/L (ref 23–29)
CREAT SERPL-MCNC: 1.2 MG/DL (ref 0.5–1.4)
DIFFERENTIAL METHOD BLD: ABNORMAL
EOSINOPHIL # BLD AUTO: 0.1 K/UL (ref 0–0.5)
EOSINOPHIL NFR BLD: 0.9 % (ref 0–8)
ERYTHROCYTE [DISTWIDTH] IN BLOOD BY AUTOMATED COUNT: 14.1 % (ref 11.5–14.5)
EST. GFR  (NO RACE VARIABLE): 46 ML/MIN/1.73 M^2
GLUCOSE SERPL-MCNC: 79 MG/DL (ref 70–110)
HCT VFR BLD AUTO: 39.7 % (ref 37–48.5)
HGB BLD-MCNC: 11.9 G/DL (ref 12–16)
IMM GRANULOCYTES # BLD AUTO: 0.04 K/UL (ref 0–0.04)
IMM GRANULOCYTES NFR BLD AUTO: 0.4 % (ref 0–0.5)
LYMPHOCYTES # BLD AUTO: 3 K/UL (ref 1–4.8)
LYMPHOCYTES NFR BLD: 32.3 % (ref 18–48)
MCH RBC QN AUTO: 29.8 PG (ref 27–31)
MCHC RBC AUTO-ENTMCNC: 30 G/DL (ref 32–36)
MCV RBC AUTO: 99 FL (ref 82–98)
MONOCYTES # BLD AUTO: 0.8 K/UL (ref 0.3–1)
MONOCYTES NFR BLD: 9 % (ref 4–15)
NEUTROPHILS # BLD AUTO: 5.4 K/UL (ref 1.8–7.7)
NEUTROPHILS NFR BLD: 57.1 % (ref 38–73)
NRBC BLD-RTO: 0 /100 WBC
PLATELET # BLD AUTO: 265 K/UL (ref 150–450)
PMV BLD AUTO: 10.1 FL (ref 9.2–12.9)
POTASSIUM SERPL-SCNC: 3.6 MMOL/L (ref 3.5–5.1)
PROT SERPL-MCNC: 7.3 G/DL (ref 6–8.4)
RBC # BLD AUTO: 4 M/UL (ref 4–5.4)
SODIUM SERPL-SCNC: 138 MMOL/L (ref 136–145)
WBC # BLD AUTO: 9.36 K/UL (ref 3.9–12.7)

## 2024-09-26 PROCEDURE — 1126F AMNT PAIN NOTED NONE PRSNT: CPT | Mod: HCNC,CPTII,S$GLB, | Performed by: NURSE PRACTITIONER

## 2024-09-26 PROCEDURE — 3288F FALL RISK ASSESSMENT DOCD: CPT | Mod: HCNC,CPTII,S$GLB, | Performed by: NURSE PRACTITIONER

## 2024-09-26 PROCEDURE — 1160F RVW MEDS BY RX/DR IN RCRD: CPT | Mod: HCNC,CPTII,S$GLB, | Performed by: OPHTHALMOLOGY

## 2024-09-26 PROCEDURE — 1160F RVW MEDS BY RX/DR IN RCRD: CPT | Mod: HCNC,CPTII,S$GLB, | Performed by: NURSE PRACTITIONER

## 2024-09-26 PROCEDURE — 80053 COMPREHEN METABOLIC PANEL: CPT | Mod: HCNC | Performed by: NURSE PRACTITIONER

## 2024-09-26 PROCEDURE — 1159F MED LIST DOCD IN RCRD: CPT | Mod: HCNC,CPTII,S$GLB, | Performed by: NURSE PRACTITIONER

## 2024-09-26 PROCEDURE — 1100F PTFALLS ASSESS-DOCD GE2>/YR: CPT | Mod: HCNC,CPTII,S$GLB, | Performed by: NURSE PRACTITIONER

## 2024-09-26 PROCEDURE — 1159F MED LIST DOCD IN RCRD: CPT | Mod: HCNC,CPTII,S$GLB, | Performed by: OPHTHALMOLOGY

## 2024-09-26 PROCEDURE — 3078F DIAST BP <80 MM HG: CPT | Mod: HCNC,CPTII,S$GLB, | Performed by: NURSE PRACTITIONER

## 2024-09-26 PROCEDURE — 36415 COLL VENOUS BLD VENIPUNCTURE: CPT | Mod: HCNC | Performed by: NURSE PRACTITIONER

## 2024-09-26 PROCEDURE — 3072F LOW RISK FOR RETINOPATHY: CPT | Mod: HCNC,CPTII,S$GLB, | Performed by: NURSE PRACTITIONER

## 2024-09-26 PROCEDURE — 99999 PR PBB SHADOW E&M-EST. PATIENT-LVL III: CPT | Mod: PBBFAC,HCNC,, | Performed by: OPHTHALMOLOGY

## 2024-09-26 PROCEDURE — 92133 CPTRZD OPH DX IMG PST SGM ON: CPT | Mod: HCNC,S$GLB,, | Performed by: OPHTHALMOLOGY

## 2024-09-26 PROCEDURE — 99214 OFFICE O/P EST MOD 30 MIN: CPT | Mod: HCNC,S$GLB,, | Performed by: NURSE PRACTITIONER

## 2024-09-26 PROCEDURE — 3074F SYST BP LT 130 MM HG: CPT | Mod: HCNC,CPTII,S$GLB, | Performed by: NURSE PRACTITIONER

## 2024-09-26 PROCEDURE — 99213 OFFICE O/P EST LOW 20 MIN: CPT | Mod: HCNC,S$GLB,, | Performed by: OPHTHALMOLOGY

## 2024-09-26 PROCEDURE — 85025 COMPLETE CBC W/AUTO DIFF WBC: CPT | Mod: HCNC | Performed by: NURSE PRACTITIONER

## 2024-09-26 PROCEDURE — 99999 PR PBB SHADOW E&M-EST. PATIENT-LVL V: CPT | Mod: PBBFAC,HCNC,, | Performed by: NURSE PRACTITIONER

## 2024-09-26 PROCEDURE — 80177 DRUG SCRN QUAN LEVETIRACETAM: CPT | Mod: HCNC | Performed by: NURSE PRACTITIONER

## 2024-09-26 PROCEDURE — 92015 DETERMINE REFRACTIVE STATE: CPT | Mod: HCNC,S$GLB,, | Performed by: OPHTHALMOLOGY

## 2024-09-26 RX ORDER — TIZANIDINE 4 MG/1
TABLET ORAL
Qty: 60 TABLET | Refills: 1 | Status: SHIPPED | OUTPATIENT
Start: 2024-09-26

## 2024-09-26 RX ORDER — HYDROXYZINE HYDROCHLORIDE 25 MG/1
50 TABLET, FILM COATED ORAL 2 TIMES DAILY PRN
Qty: 60 TABLET | Refills: 11 | Status: SHIPPED | OUTPATIENT
Start: 2024-09-26 | End: 2025-09-26

## 2024-09-26 RX ORDER — LEVETIRACETAM 1000 MG/1
1000 TABLET ORAL 2 TIMES DAILY
Qty: 60 TABLET | Refills: 11 | Status: SHIPPED | OUTPATIENT
Start: 2024-09-26 | End: 2025-09-26

## 2024-09-26 NOTE — TELEPHONE ENCOUNTER
Can you refill?tiZANidine (ZANAFLEX) 4 MG tablet     Last Visit:08/01/2024  Next Visit:11/01/2024  Last refill:08/01/2024  How pt patient is currently taking medication requested: Sig - Route: Take 1 tablet (4 mg total) by mouth every 12 (twelve) hours as needed. - Oral   Pharmacy:   [facility] - [place] - [id number] [facility]

## 2024-09-26 NOTE — PROGRESS NOTES
HPI     Glaucoma     Additional comments: Pt reports for 1 month with IOP check, GOCT, MRx,   and dry eye check. 100% compliant with gtts. Denies any pain or   irritation.            Comments    1. Mod COAG goal = 18-19 +Fhx coag-mother   Pt states she has had elevated pressures in the past.   2. 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 ERM OD  CR scar OD  4. DM-2005  5. PCIOL OU? -done in [place]  6. Dry Eyes  7. Refractive error PAL Rx    Latanoprost ou qhs  Dorzolamide/Timolol BID OU    AT's prn OU           Last edited by Tanja Lofton on 9/26/2024 10:01 AM.            Assessment /Plan     For exam results, see Encounter Report.    Primary open angle glaucoma (POAG) of both eyes, moderate stage  Doing well, intraocular pressure (IOP) within acceptable range relative to target IOP with no evidence of progression. Continue current treatment. Reviewed importance of continued compliance with treatment and follow up.      Continue current gtts:  Latanoprost one drop in each eye nightly and Dorzolamide/Timolol (Cosopt) one drop in each eye every 12 hours    RTC in 3 months with HVF 24-2, CCT, and DFE.      Dry eye  Start PFATs at least 6 times daily. If still no improvement next visit, consider preservative free glaucoma gtts.    Pseudophakia of both eyes  Stable, monitor yearly. Mrx provided.    Diabetes mellitus type 2 without retinopathy  DFE next visit.      RTC 3 months DFE, HVF 24-2, pachy

## 2024-09-26 NOTE — PROGRESS NOTES
Population Health Chart Review & Patient Outreach Details      Additional [facility] Health Notes:    Per JANE in basket message, pt declined flu vaccine. HM updated.           Updates Requested / Reviewed:      Updated Care Coordination Note, Care Everywhere, and Immunizations Reconciliation Completed or Queried: [facility] Topics Overdue:      [facility] Score: 2     Osteoporosis Screening  Eye Exam    RSV Vaccine                  Health Maintenance Topic(s) Outreach Outcomes & Actions Taken:

## 2024-09-26 NOTE — TELEPHONE ENCOUNTER
Called patient in regards to med refill sent from pharmacy. Patient said they needed a refill. I informed them that I would let the provider know.    Sheree CAMPBELL

## 2024-09-26 NOTE — PROGRESS NOTES
"Subjective:       Patient ID: Shireen Felix is a 78 y.o. female.    Chief Complaint: Other symptoms and signs involving the nervous system          HPI         BACKGROUND HISTORY       The patient is here to establish care for numerous, complex chronic neurological disorders.    Had a stroke in 2000/2001 that caused LT HP. On Plavix. 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 Ordered F/U CTA H/N.    Was started on PHT for "seizure prophylaxis" after the aneurysm and was stopped after 2 years. Around 5391-5121 she started having drop attacks with LOC for few minutes with no seizure activity. Was started on OXC and then switched to  mg QD by neurology at [facility].  She was undergoing cardiac evaluation. Stopped driving in 2017.  Ordered  EEG A/S.Changes ESL to 800 mg QD.    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 Failed PGB. She is on  mg BID and Lidocaine patches. GCA was suspected with no TORO.   Changed ESL to 800 mg QD and added neuropathic pain cream. Checked ESR.    The patient is also complaining of memory difficulties and remains independent. Ordered CTH, T4, FA,, HC, B12, MMA, RPR.    No new stroke symptoms. On Plavix and Vascular Risk Factors (VRFs).    On 10- CTA H/N Stable. No evidence of acute intracranial hemorrhage.Bilateral craniotomies with LT MCA aneurysm clip.No evidence of recurrent or residual aneurysm sac.  No other aneurysms.    09- through 10-  92 hours AEEG B/L TLE, F8-T8, F7-T7. Continues to have "seizures" on   mg QD. Hyponatremia continues to be a problem.      On 08- ESR is NL for her age and improving 80>67>58 (Unlikely GCA/TA).   mg QD helped with TN and PHN.     Memory is stable. On 10- CTA H/N Stable. No evidence of acute " "intracranial hemorrhage.Bilateral craniotomies with LT MCA aneurysm clip.No evidence of recurrent or residual aneurysm sac.  No other aneurysms. On 10- Labs NL B12-MMA, FA-HC, T4, RPR.     Snuffed from recurrent falls. Continues to have "seizures" on   mg QD. Hyponatremia continues to be a problem.  Her polypharmacy is extremely extensive. On 02- CT C/L Spine MRI  Multilevel DDD    INTERVAL HISTORY     Patient present for follow up for Multiple Neurological Symptoms. Accompanied by family member.patient doing well today. She is ambulatory with out assistance.  Patient reports headaches have been less occurring. No new stroke symptoms. Plavix and Vascular Risk Factors (VRFs).     mg QD helped with TN and PHN.     Memory is stable.     Continues to have "seizures" on   mg QD. Tolerating  mg po BID. Her polypharmacy is extremely extensive. Patient reports last seizure episode 08-. Discussed plan of care to titrate the dose of LEV 1000 mg po BID. Patient verbalized understanding.            Review of Systems   Constitutional:  Positive for fatigue. Negative for appetite change.   HENT:  Negative for hearing loss and tinnitus.    Eyes:  Negative for photophobia and visual disturbance.   Respiratory:  Positive for shortness of breath. Negative for apnea.    Cardiovascular:  Negative for chest pain and palpitations.   Gastrointestinal:  Negative for nausea and vomiting.   Endocrine: Negative for cold intolerance and heat intolerance.   Genitourinary:  Positive for urgency. Negative for difficulty urinating.   Musculoskeletal:  Positive for arthralgias, back pain, gait problem and neck pain. Negative for joint swelling, myalgias and neck stiffness.   Skin:  Negative for color change and rash.   Allergic/Immunologic: Negative for environmental allergies and immunocompromised state.   Neurological:  Positive for dizziness, seizures, syncope, weakness, light-headedness and " numbness. Negative for tremors, facial asymmetry, speech difficulty and headaches.   Hematological:  Negative for adenopathy. Does not bruise/bleed easily.   Psychiatric/Behavioral:  Positive for confusion, dysphoric mood and sleep disturbance. Negative for agitation, behavioral problems, decreased concentration, hallucinations, self-injury and suicidal ideas. The patient is nervous/anxious. The patient is not hyperactive.                  Current Outpatient Medications:     albuterol (PROVENTIL) 2.5 mg /3 mL (0.083 %) nebulizer solution, Take 3 mLs (2.5 mg total) by nebulization every 4 (four) hours as needed for Wheezing. Rescue, Disp: 180 mL, Rfl: 11    albuterol-budesonide (AIRSUPRA) 90-80 mcg/actuation, Inhale 2 puffs into the lungs every 4 (four) hours as needed (wheezing, cough)., Disp: 10.7 g, Rfl: 11    APTIOM 400 mg Tab tablet, Take 400 mg by mouth., Disp: , Rfl:     atorvastatin (LIPITOR) 40 MG tablet, Take 1 tablet (40 mg total) by mouth once daily., Disp: 90 tablet, Rfl: 1    bisoprolol (ZEBETA) 5 MG tablet, Take 1 tablet (5 mg total) by mouth once daily., Disp: 30 tablet, Rfl: 11    blood sugar diagnostic Strp, To check BG 1 times daily, to use with insurance preferred meter, Disp: 100 strip, Rfl: 3    blood-glucose meter kit, To check BG 1 times daily, to use with insurance preferred meter, Disp: 1 each, Rfl: 0    cholecalciferol, vitamin D3, 1,250 mcg (50,000 unit) capsule, Take 1 capsule (50,000 Units total) by mouth every 7 days., Disp: 12 capsule, Rfl: 0    cholestyramine (QUESTRAN) 4 gram packet, TAKE 1 PACKET BY MOUTH TWICE DAILY AS DIRECTED, Disp: 60 packet, Rfl: 11    clopidogreL (PLAVIX) 75 mg tablet, Take 1 tablet (75 mg total) by mouth once daily., Disp: 90 tablet, Rfl: 3    denosumab (PROLIA) 60 mg/mL Syrg, every 6 (six) months., Disp: , Rfl:     dicyclomine (BENTYL) 10 MG capsule, Take 1 capsule (10 mg total) by mouth 2 (two) times daily., Disp: 10 capsule, Rfl: 0    dorzolamide-timolol  2-0.5% (COSOPT) 22.3-6.8 mg/mL ophthalmic solution, Place 1 drop into both eyes every 12 (twelve) hours., Disp: 10 mL, Rfl: 3    empagliflozin (JARDIANCE) 10 mg tablet, Take 1 tablet (10 mg total) by mouth once daily., Disp: 90 tablet, Rfl: 3    EScitalopram oxalate (LEXAPRO) 20 MG tablet, Take 1 tablet (20 mg total) by mouth once daily., Disp: 90 tablet, Rfl: 2    fluconazole (DIFLUCAN) 150 MG Tab, Take 1 tablet (150 mg total) by mouth once daily., Disp: 2 tablet, Rfl: 0    fluocinonide (LIDEX) 0.05 % external solution, Apply topically 2 (two) times daily. Use on itchy spots on scalp and ear, Disp: 60 mL, Rfl: 2    fluticasone propionate (FLONASE) 50 mcg/actuation nasal spray, 2 sprays (100 mcg total) by Each Nostril route once daily., Disp: 16 g, Rfl: 11    fluticasone-umeclidin-vilanter (TRELEGY ELLIPTA) 200-62.5-25 mcg inhaler, Inhale 1 puff into the lungs once daily., Disp: 60 each, Rfl: 11    furosemide (LASIX) 20 MG tablet, Take 1 tablet (20 mg total) by mouth daily as needed (for edema, swelling, fluid build up, or 3 pound weight gain in 24 hours)., Disp: 30 tablet, Rfl: 11    gabapentin (NEURONTIN) 300 MG capsule, Take 1 capsule (300 mg total) by mouth 3 (three) times daily., Disp: 90 capsule, Rfl: 1    HYDROcodone-acetaminophen (NORCO) 5-325 mg per tablet, Take 1 tablet by mouth every 6 (six) hours as needed for Pain., Disp: , Rfl:     ibuprofen (ADVIL,MOTRIN) 800 MG tablet, Take 1 tablet (800 mg total) by mouth 3 (three) times daily., Disp: 60 tablet, Rfl: 0    ipratropium (ATROVENT) 42 mcg (0.06 %) nasal spray, 2 sprays by Each Nostril route 2 (two) times daily., Disp: 15 mL, Rfl: 11    isosorbide mononitrate (IMDUR) 120 MG 24 hr tablet, Take 1 tablet (120 mg total) by mouth every evening., Disp: 90 tablet, Rfl: 3    ketoconazole (NIZORAL) 2 % cream, Apply topically 2 (two) times daily. For dry flaky patches of ear., Disp: 30 g, Rfl: 1    lancets Misc, To check BG 1 times daily, to use with insurance  preferred meter, Disp: 100 each, Rfl: 3    latanoprost 0.005 % ophthalmic solution, Place 1 drop into both eyes every evening., Disp: 2.5 mL, Rfl: 3    linezolid (ZYVOX) 600 mg Tab, Take 1 tablet (600 mg total) by mouth every 12 (twelve) hours., Disp: 14 tablet, Rfl: 0    magnesium oxide (MAG-OX) 400 mg (241.3 mg magnesium) tablet, Take 1 tablet (400 mg total) by mouth once daily., Disp: 90 tablet, Rfl: 1    montelukast (SINGULAIR) 10 mg tablet, Take 1 tablet (10 mg total) by mouth once daily., Disp: 90 tablet, Rfl: 3    montelukast (SINGULAIR) 10 mg tablet, Take 1 tablet (10 mg total) by mouth once daily., Disp: 90 tablet, Rfl: 3    mucus clearing device (AEROBIKA OSCILLATING PEP SYSTM), by Misc.(Non-Drug; Combo Route) route 4 (four) times daily., Disp: 1 each, Rfl: 0    nitroGLYCERIN (NITROSTAT) 0.4 MG SL tablet, Place 1 tablet (0.4 mg total) under the tongue every 5 (five) minutes as needed for Chest pain., Disp: 100 tablet, Rfl: 0    omeprazole (PRILOSEC) 40 MG capsule, Take 1 capsule (40 mg total) by mouth every morning., Disp: 90 capsule, Rfl: 3    ondansetron (ZOFRAN-ODT) 4 MG TbDL, Take 1 tablet (4 mg total) by mouth every 6 (six) hours as needed., Disp: 30 tablet, Rfl: 0    pantoprazole (PROTONIX) 40 MG tablet, TAKE 1 TABLET BY MOUTH TWICE DAILY, Disp: 60 tablet, Rfl: 11    potassium chloride SA (K-DUR,KLOR-CON) 20 MEQ tablet, Take 1 tablet (20 mEq total) by mouth once daily. Take extra dose with Lasix - fluid pill, Disp: 90 tablet, Rfl: 1    pramoxine-hydrocortisone (ANALPRAM HC) cream, Apply topically 3 (three) times daily., Disp: 57 g, Rfl: 11    predniSONE (DELTASONE) 10 MG tablet, Take 1 tablet (10 mg total) by mouth once daily., Disp: 30 tablet, Rfl: 0    ranolazine (RANEXA) 1,000 mg Tb12, Take 1 tablet (1,000 mg total) by mouth 2 (two) times daily., Disp: 180 tablet, Rfl: 3    roflumilast (DALIRESP) 500 mcg Tab, TAKE ONE TABLET BY MOUTH DAILY, Disp: 90 tablet, Rfl: 3    sacubitriL-valsartan  (ENTRESTO) 24-26 mg per tablet, Take 1 tablet by mouth 2 (two) times daily., Disp: 180 tablet, Rfl: 3    sodium chloride 3% 3 % nebulizer solution, Take 4 mLs by nebulization as needed for Cough (chest congestion)., Disp: 360 mL, Rfl: 11    spironolactone (ALDACTONE) 50 MG tablet, Take 1 tablet (50 mg total) by mouth once daily., Disp: 90 tablet, Rfl: 1    tezepelumab-ekko 210 mg/1.91 mL (110 mg/mL) PnIj, Inject 210 mg into the skin every 28 days., Disp: 1.91 mL, Rfl: 11    tiZANidine (ZANAFLEX) 4 MG tablet, Take 1 tablet (4 mg total) by mouth every 12 (twelve) hours as needed., Disp: 60 tablet, Rfl: 1    triamcinolone acetonide 0.025% (KENALOG) 0.025 % cream, Apply topically 2 (two) times daily. PRN rash and itching of ear. Mild steroid cream., Disp: 30 g, Rfl: 0    hydrOXYzine HCL (ATARAX) 25 MG tablet, Take 2 tablets (50 mg total) by mouth 2 (two) times daily as needed for Itching., Disp: 60 tablet, Rfl: 11    levETIRAcetam (KEPPRA) 1000 MG tablet, Take 1 tablet (1,000 mg total) by mouth 2 (two) times daily., Disp: 60 tablet, Rfl: 11    MYRBETRIQ 50 mg Tb24, TAKE 1 TABLET BY MOUTH ONCE DAILY (Patient not taking: Reported on 5/8/2024), Disp: 30 tablet, Rfl: 0  Past Medical History:   Diagnosis Date    Abnormal ECG 8/5/2019    Aneurysm     Anticoagulant long-term use     Arthritis     CAD in native artery 8/5/2019    COPD (chronic obstructive pulmonary disease)     Diabetes mellitus     Fall 10/10/2019    Formatting of this note might be different from the original. Found sitting on floor next to bed last night Mild confusion today Does not recall falling or how she ended up on floor UA today Labs yesterday unremarkable    Glaucoma     Hypertension     Seizures     Shingles 05/27/2017    Stroke      Past Surgical History:   Procedure Laterality Date    BRAIN SURGERY      CARDIAC CATHETERIZATION      CATHETERIZATION OF BOTH LEFT AND RIGHT HEART N/A 5/17/2024    Procedure: CATHETERIZATION, HEART, BOTH LEFT AND RIGHT;   Surgeon: Rachelle Sarabia MD;  Location: [facility] CATH LAB;  Service: Cardiology;  Laterality: N/A;    COLONOSCOPY N/A 09/21/2023    Procedure: COLONOSCOPY;  Surgeon: Joanna Leal MD;  Location: [facility] ENDO;  Service: Endoscopy;  Laterality: N/A;    CORONARY ANGIOPLASTY      EPIDURAL STEROID INJECTION INTO CERVICAL SPINE N/A 2/7/2024    Procedure: Cervical IL XANDER, Level C6/7, RN IV sedation;  Surgeon: Francisco Oshea MD;  Location: [facility] PAIN MGT;  Service: Pain Management;  Laterality: N/A;    EPIDURAL STEROID INJECTION INTO LUMBAR SPINE Bilateral 11/12/2019    Procedure: TF XANDER L4/5;  Surgeon: Desean Dias MD;  Location: [facility] PAIN [facility]GT;  Service: Pain Management;  Laterality: Bilateral;    HYSTERECTOMY      INJECTION OF ANESTHETIC AGENT AROUND MEDIAL BRANCH NERVES INNERVATING LUMBAR FACET JOINT Bilateral 01/31/2020    Procedure: Bilateral L3-5 MBB;  Surgeon: Desean Dias MD;  Location: [facility] PAIN [facility]GT;  Service: Pain Management;  Laterality: Bilateral;    INJECTION OF ANESTHETIC AGENT INTO SACROILIAC JOINT Right 11/16/2021    Procedure: Right BLOCK, SACROILIAC JOINT Right GTB with RN IV sedation;  Surgeon: Yao Fulton MD;  Location: [facility] PAIN [facility]GT;  Service: Pain Management;  Laterality: Right;    INJECTION OF ANESTHETIC AGENT INTO SACROILIAC JOINT Bilateral 07/28/2023    Procedure: Bilateral GT bursa + bilateral SIJ injection;  Surgeon: Francisco Oshea MD;  Location: [facility]IN [facility];  Service: Pain Management;  Laterality: Bilateral;    INJECTION OF JOINT Bilateral 07/09/2020    Procedure: Bilateral shoulder GH Joint injection with local;  Surgeon: Desean Dias MD;  Location: [facility] PAIN [facility]GT;  Service: Pain Management;  Laterality: Bilateral;    INJECTION OF JOINT Bilateral 07/28/2023    Procedure: Bilateral GT bursa + bilateral SIJ injection NEEDS ADDITIONAL SEDATION AND MORE TIME BEFORE INJECTION RN IV Sedation;  Surgeon: Francisco Oshea MD;  Location: [facility]IN [facility];  Service: Pain Management;   Laterality: Bilateral;    INJECTION OF JOINT N/A 10/25/2023    Procedure: Sacrococcygeal joint injection;  Surgeon: Francisco Oshea MD;  [facility];  Service: Pain Management;  Laterality: N/A;    OOPHORECTOMY      SELECTIVE INJECTION OF ANESTHETIC AGENT AROUND LUMBAR SPINAL NERVE ROOT BY TRANSFORAMINAL APPROACH Bilateral 2023    Procedure: Bilateral L4/5 TF XANDER RN IV Sedation;  Surgeon: Francisco Oshea MD;  Location: [facility]IN [facility];  Service: Pain Management;  Laterality: Bilateral;    TRANSFORAMINAL EPIDURAL INJECTION OF STEROID Bilateral 2019    Procedure: Bilateral L3/4 Transforaminal Epidural Steroid Injection;  Surgeon: Desean Dias MD;  Location: [facility];  Service: Pain Management;  Laterality: Bilateral;    TRANSFORAMINAL EPIDURAL INJECTION OF STEROID Bilateral 03/10/2020    Procedure: Right T12/L1 TF XANDER with local;  Surgeon: Desean Dias MD;  Location: [facility];  Service: Pain Management;  Laterality: Bilateral;    TRANSFORAMINAL EPIDURAL INJECTION OF STEROID Right 2020    Procedure: Right T12/L1 TFESI Covid day of procedure;  Surgeon: Desean Dias MD;  [facility]: [facility];  Service: Pain Management;  Laterality: Right;     Social History     Socioeconomic History    Marital status: Single   Tobacco Use    Smoking status: Former     Current packs/day: 0.00     Average packs/day: 1 pack/day for 42.3 years (42.3 ttl pk-yrs)     Types: Cigarettes     Start date:      Quit date: 2004     Years since quittin.4    Smokeless tobacco: Former   Substance and Sexual Activity    Alcohol use: No    Drug use: Never    Sexual activity: Not Currently     Partners: Male   Social History Narrative    No pets or smokers in household.     Social Determinants of Health     Financial Resource Strain: High Risk (2024)    Overall Financial Resource Strain (CARDIA)     Difficulty of Paying Living Expenses: Very hard   Food Insecurity: No Food Insecurity  (7/30/2024)    Hunger Vital Sign     Worried About Running Out of Food in the Last Year: Never true     Ran Out of Food in the Last Year: Never true   Transportation Needs: No Transportation Needs (6/17/2024)    [facility]    PRAPARE - Transportation     Lack of Transportation (Medical): No     Lack of Transportation (Non-Medical): No   Physical Activity: Unknown (7/30/2024)    Exercise Vital Sign     Days of Exercise per Week: 3 days   Recent Concern: Physical Activity - Inactive (6/17/2024)    [facility]    Exercise Vital Sign     Days of Exercise per Week: 0 days     Minutes of Exercise per Session: 0 min   Stress: Patient Declined (7/30/2024)    [employer] - Occupational Stress Questionnaire     Feeling of Stress : Patient declined   Recent Concern: Stress - Stress Concern Present (6/17/2024)    [facility]    [employer] - Occupational Stress Questionnaire     Feeling of Stress : Very much             Past/Current Medical/Surgical History, Past/Current Social History, Past/Current Family History and Past/Current Medications were reviewed in detail.        Objective:           VITAL SIGNS WERE REVIEWED      GENERAL APPEARANCE:     The patient looks comfortable.    BMI 24.62    No signs of respiratory distress.    Normal breathing pattern.    No dysmorphic features    Normal eye contact.     GENERAL MEDICAL EXAM:    HEENT:  Head is atraumatic normocephalic. Fundoscopic (Ophthalmoscopic) exam showed no disc edema.      Neck and Axillae: No JVD. No visible lesions.    Cardiopulmonary: No cyanosis. No tachypnea. Normal respiratory effort.    Gastrointestinal/Urogenital:  No jaundice. No stomas or  lesions. No visible hernias. No catheters.     Skin, Hair and Nails: Multiple ecchymoses. No stigmata of autoimmune disease. No clubbing.    Limbs: No varicose veins. No visible swelling.    Muskoskeletal: No visible deformities.No visible lesions.           Neurologic Exam     Mental Status   Oriented to person, place, and time.   Follows 3 step commands.   Attention: normal. Concentration: normal.   Speech: speech is normal   Level of consciousness: alert  Able to perform simple calculations.   Able to name object. Able to repeat. Normal comprehension.     Cranial Nerves     CN II   Visual acuity: decreased  Right visual field deficit: none  Left visual field deficit: upper temporal and upper nasal quadrant(s)    CN III, IV, VI   Pupils are equal, round, and reactive to light.  Extraocular motions are normal.   Right pupil: Size: 2 mm. Shape: regular. Reactivity: brisk. Consensual response: intact. Accommodation: intact.   Left pupil: Size: 2 mm. Shape: regular. Reactivity: brisk. Consensual response: intact. Accommodation: intact.   CN III: no CN III palsy  CN VI: no CN VI palsy  Nystagmus: none   Diplopia: none  Ophthalmoparesis: none  Upgaze: normal  Downgaze: normal  Conjugate gaze: present  Vestibulo-ocular reflex: present    CN V   Facial sensation intact.   Right facial sensation deficit: none  Left facial sensation deficit: none    CN VII   Facial expression full, symmetric.   Right facial weakness: none  Left facial weakness: none    CN VIII   CN VIII normal.   Hearing: intact    CN IX, X   CN IX normal.   CN X normal.   Palate: symmetric    CN XI   CN XI normal.   Right sternocleidomastoid strength: normal  Left sternocleidomastoid strength: normal  Right trapezius strength: normal  Left trapezius strength: normal    CN XII   CN XII normal.   Tongue: not atrophic  Fasciculations: absent  Tongue deviation: none    Motor Exam   Muscle bulk: decreased  Right arm tone: normal  Left arm tone: increased  Right  leg tone: normal  Left leg tone: normal    Strength   Strength 5/5 throughout.   Right neck flexion: 5/5  Left neck flexion: 4/5  Right neck extension: 5/5  Left neck extension: 4/5  Right deltoid: 5/5  Left deltoid: 4/5  Right biceps: 5/5  Left biceps: 4/5  Right triceps: 5/5  Left triceps: 4/5  Right wrist flexion: 5/5  Left wrist flexion: 4/5  Right wrist extension: 5/5  Left wrist extension: 4/5  Right interossei: 5/5  Left interossei: 4/5  Right iliopsoas: 5/5  Left iliopsoas: 5/5  Right quadriceps: 5/5  Left quadriceps: 5/5  Right hamstrin  Left hamstrin  Right glutei: 5  Left glutei: 5  Right anterior tibial: 5  Left anterior tibial: 5  Right posterior tibial: 5  Left posterior tibial: 5  Right peroneal: 5  Left peroneal: 5  Right gastroc: 5/  Left gastroc: 5    Sensory Exam   Right arm light touch: decreased from wrist  Left arm light touch: decreased from wrist  Right leg light touch: decreased from ankle  Left leg light touch: decreased from ankle  Right arm vibration: normal  Left arm vibration: normal  Right leg vibration: decreased from toes  Left leg vibration: decreased from toes  Right arm proprioception: normal  Left arm proprioception: normal  Right leg proprioception: decreased from toes  Left leg proprioception: decreased from toes  Right arm pinprick: decreased from wrist  Left arm pinprick: decreased from wrist  Right leg pinprick: decreased from ankle  Left leg pinprick: decreased from ankle  Graphesthesia: normal  Stereognosis: normal    Gait, Coordination, and Reflexes     Gait  Gait: (Antalgic)    Coordination   Finger to nose coordination: normal  Heel to shin coordination: normal    Tremor   Resting tremor: absent  Intention tremor: absent  Action tremor: absent    Reflexes   Right brachioradialis: 1+  Left brachioradialis: 1+  Right biceps: 1+  Left biceps: 1+  Right triceps: 1+  Left triceps: 1+  Right patellar: 1+  Left patellar: 1+  Right achilles:  0  Left achilles: 0  Right plantar: normal  Left plantar: normal  Right Thompson: absent  Left Thompson: absent  Right ankle clonus: absent  Left ankle clonus: absent  Right pendular knee jerk: absent  Left pendular knee jerk: absent      Lab Results   Component Value Date    WBC 7.78 07/12/2024    HGB 11.8 (L) 07/12/2024    HCT 39.8 07/12/2024    MCV 99 (H) 07/12/2024     07/12/2024     Sodium   Date Value Ref Range Status   07/12/2024 134 (L) 136 - 145 mmol/L Final     Potassium   Date Value Ref Range Status   07/12/2024 3.7 3.5 - 5.1 mmol/L Final     Chloride   Date Value Ref Range Status   07/12/2024 108 95 - 110 mmol/L Final     CO2   Date Value Ref Range Status   07/12/2024 17 (L) 23 - 29 mmol/L Final     Glucose   Date Value Ref Range Status   07/12/2024 100 70 - 110 mg/dL Final     BUN   Date Value Ref Range Status   07/12/2024 19 8 - 23 mg/dL Final     Creatinine   Date Value Ref Range Status   07/12/2024 1.2 0.5 - 1.4 mg/dL Final     Calcium   Date Value Ref Range Status   07/12/2024 8.7 8.7 - 10.5 mg/dL Final     Total Protein   Date Value Ref Range Status   07/12/2024 7.1 6.0 - 8.4 g/dL Final     Albumin   Date Value Ref Range Status   07/12/2024 3.4 (L) 3.5 - 5.2 g/dL Final     Total Bilirubin   Date Value Ref Range Status   07/12/2024 0.3 0.1 - 1.0 mg/dL Final     Comment:     For infants and newborns, interpretation of results should be based  on gestational age, weight and in agreement with clinical  observations.    Premature Infant recommended reference ranges:  Up to 24 hours.............<8.0 mg/dL  Up to 48 hours............<12.0 mg/dL  3-5 days..................<15.0 mg/dL  6-29 days.................<15.0 mg/dL       Alkaline Phosphatase   Date Value Ref Range Status   07/12/2024 59 55 - 135 U/L Final     AST   Date Value Ref Range Status   07/12/2024 15 10 - 40 U/L Final     ALT   Date Value Ref Range Status   07/12/2024 9 (L) 10 - 44 U/L Final     Anion Gap   Date Value Ref Range Status    07/12/2024 9 8 - 16 mmol/L Final     eGFR if    Date Value Ref Range Status   06/02/2022 >60 >60 mL/min/1.73 m^2 Final     eGFR    Date Value Ref Range Status   06/22/2024 74 mL/min/1.73mSq Final     Comment:     In accordance with NKF-ASN Task Force recommendation, calculation based on the Chronic Kidney Disease Epidemiology Collaboration (CKD-EPI) equation without adjustment for race. eGFR adjusted for gender and age and calculated in ml/min/1.73mSquared. eGFR cannot be calculated if patient is under 18 years of age.     Reference Range:   >= 60 ml/min/1.73mSquared.     eGFR if non    Date Value Ref Range Status   06/02/2022 >60 >60 mL/min/1.73 m^2 Final     Comment:     Calculation used to obtain the estimated glomerular filtration  rate (eGFR) is the CKD-EPI equation.        Lab Results   Component Value Date    [id number] 612 09/15/2023     Lab Results   Component Value Date    TSH 1.004 09/15/2023    D8CFLKK 5.9 10/27/2021     CT HEAD WO:    08-    CT HEAD NL     03->05-    CTH Bilateral craniotomy changes in the frontal and temporal regions.  Aneurysm clip present in the floor of the left middle cranial fossa.  Old appearing lacunar infarct in the anterior limb of the right internal capsule.             [id number]    CT Spine Multilevel DDD          08-    ESR is NL for her age and improving 80>67>58     Unlikely GCA/TA.         08- (Interpreted 08-)    EEG NL       09- through 10-     92 hours AEEG    B/L TLE, F8-T8, F7-T7      10-    CTA H/N Stable. No evidence of acute intracranial hemorrhage.Bilateral craniotomies with LT MCA aneurysm clip.No evidence of recurrent or residual aneurysm sac.  No other aneurysms.      10-    Labs NL    B12-MMA, FA-HC, T4, RPR       02-    CT C/L Spine MRI  Multilevel DDD      05-    CTH No new changes         Reviewed the neuroimaging  independently       Assessment:       1. Temporal lobe seizure    2. Itching                Modified Traill Score (m-RS)      0  The patient has no residual symptoms.    1  The patient has no significant disability; able to carry out all pre-stroke activities.    2  The patient has slight disability; unable to carry out all pre-stroke activities but able to look after self without daily help.    3  The patient has moderate disability; requiring some external help but able to walk without the assistance of another individual.    4  The patient has moderately severe disability; unable to walk or attend to bodily functions without assistance of another individual.    5  The patient has severe disability; bedridden, incontinent, requires continuous care.    6  The patient has  (during the hospital stay or after discharge from the hospital).      EPILEPSY CLASSIFICATION    SEMIOLOGY:  DROP ATTACKS     EPILEPTOGENIC ZONE (S):  TEMPORAL LOBE     ETIOLOGY: STROKE, CRANIOTOMY     PRIOR AEDS: , PHT, OXC     CURRENT AEDS: ESL     LAST SEIZURE DATE:       COMPREHENSIVE LIST OF AEDs:     Acetazolamide (AZM-Diamox)   Benzos: clonazepam (CZP Klonopin), lorazepam (LZP-Ativan), diazepam (DZP-Valium), clorazepate (CLZ- Tranxene)  Brivaracetam (BRV-Briviact)  Cannabidiol (CBD- Epidiolex)  Carbamazepine (CBZ-Tegretol)  Cenobamate (CNB-Xcopri)  Clobazam (CLB-Onfi)  Eslicarbazepine (ESL-Aptiom)  Ethosuximide (ESX-Zarontin)  Felbamate (FBM-Felbatol)  Fenfluramine (FFA-Fintepla)  Gabapentin (GBP-Neurontin)  Lacosamide (LCM-Vimpat)  Lamotrigine (LTG-Lamitcal)  Levetiracetam (LEV- Keppra)  Oxcarbazepine (OXC-Trileptal)  Perampanel (PML-Fycompa)  Phenobarbital (PB)  Phenytoin (PHT-Dilantin)  Pregabalin (PGB-Lyrica)  Primidone (PRM)   Retigabine (RTG- Potiga) Discontinued in   Rufinamide (RFN-Benzil)  Stiripentol (STP-Diacomit)  Tiagabine (TGB-Gabitril)  Topiramate (TPM-Topamax)  Valproate (VPA-Depakote)  Vigabatrin  (VGB-Sabril)  Zonisamide (ZNS-Zonegran)   Plan:           HISTORY OF LACUNAR RT PLIC STROKES        Continue Plavix.     Vascular Risk Factors (VRFs) stratification (BP, BS, BC control and Smoking Cessation) is the mainstay of stroke prevention (>90%)..       Healthy diet and exercise    Avoid driving.    Call 911 if any SUDDEN:   Weakness  Numbness  Slurring of speech  Speech difficulty   Vertigo  Loss of balance  Loss of vision  Loss of hearing  Double vision  Trouble swallowing  Trouble breathing  Facial drooping        HISTORY OF MULTIPLE ANEURYSMS S/P CRANIOTOMY       Call 911 for the worst head (thunderclap headache).          DROP ATTACKS, SEEM TO BE SYNCOPAL AND EPILEPTIC, TEMPORAL LOBE EPILEPSY       Full seizure precautions and avoid driving.    Continue Aptiom   mg    Increase  mg to  1000  po BID.     The patient was encouraged to maintain full traditional seizure precautions which include but not limited to avoidance of driving, biking, high altitudes (ladders, escalators, rock climbing, mountain climbing, skiing, jian diving, moderate to difficult hiking), proximity to fire or fire source, proximity to body of water or swimming alone, operating heavy and potentially risky machinery, using sharp objects, using exercise machines like treadmill and weight lifting. Walking while accompanied on soft surfaces like grass is preferable.The patient was encouraged to shower (without accumulation of water) instead of taking a bath if unsupervised. The patient was made aware that these precautions are especially important during concurrent illnesses, fever, infections, vomiting, changing medications and running out of anti-seizure medications. Instructed the patient to avoid night shifts, sleep deprivation and alcohol or recreational drug use as adequate sleep and avoidance of alcohol/drugs are very important measures to assure good seizure control. The patient was also advised not to care for young  children without company. The patient is advised to pad the side rails with pillows and blankets if applicable.I strongly recommended lowering the bed to the floor level to decrease the risk of falls during nocturnal seizures that occur during sleep. I also instructed the patient to avoid safety sensitive duties. In general, any activity that requires full awareness and would result in serious injury to self and others if a seizure takes place should be avoided.    AVOID any substance that could lower seizure threshold including but not limited to:        ALCOHOL AND WITHDRAWAL      TRAMADOL.     MEPERIDINE (DEMEROL)     ALL STIMULANTS-ALL ADHD MEDICATIONS.      CLOZAPINE.      BUPROPION (WELLBUTRIN)     CIPROFLOXACIN.    CYCLOSPORINE.     METOCLOPRAMIDE (REGLAN).     TETRAHYDROCANNABINOL (THC)    KRATOM           POST-HERPETIC NEURALGIA (PHN)        Compound neuropathic pain cream.           BENIGN MEMORY LOSS: MULTIFACTORIAL       Monitor clinically.      RECURRENT FALLS: MULTIFACTORIAL: EXTENSIVE POLYPHARMACY, HYPONATREMIA    Falling down precautions.    Full seizure precautions and avoid driving.    Aptiom   mg resolved hyponatremia.     Increase  mg to 1000 mg po BID.     Pharmacy review for polypharmacy                  MEDICAL/SURGICAL COMORBIDITIES     All relevant medical comorbidities noted and managed by primary care physician and medical care team.          MISCELLANEOUS MEDICAL PROBLEMS       HEALTHY LIFESTYLE AND PREVENTATIVE CARE    The patient to adhere to the age-appropriate health maintenance guidelines including screening tests and vaccinations. The patient to adhere to  healthy lifestyle, optimal weight, exercise, healthy diet, good sleep hygiene and avoiding drugs including smoking, alcohol and recreational drugs.        RTC in 6 months              Joey Contreras, MSN NP      Collaborating Provider: Emerita Simpson MD, FAAN Neurologist/Epileptologist        I spent a total of 30  minutes on the day of the visit.  This includes face to face time and non-face to face time preparing to see the patient (eg, review of tests), obtaining and/or reviewing separately obtained history, documenting clinical information in the electronic or other health record, independently interpreting results and communicating results to the patient/family/caregiver, or care coordinator.

## 2024-09-30 LAB — LEVETIRACETAM SERPL-MCNC: 28.4 UG/ML (ref 3–60)

## 2024-10-24 ENCOUNTER — TELEPHONE (OUTPATIENT)
Dept: INTERNAL MEDICINE | Facility: CLINIC | Age: 79
End: 2024-10-24
Payer: MEDICARE

## 2024-10-24 DIAGNOSIS — Z12.31 ENCOUNTER FOR MAMMOGRAM TO ESTABLISH BASELINE MAMMOGRAM: Primary | ICD-10-CM

## 2024-10-24 NOTE — TELEPHONE ENCOUNTER
----- Message from Winnie sent at 10/24/2024  2:36 PM CDT -----  Contact: [phone]  .[email or web address]     Patient is calling for Medical Advice regarding:mammo order     How long has patient had these symptoms:    Pharmacy name and phone#:    Patient wants a call back or thru [facility]:call back     Comments:  She is asking for her mammo order she states she would like to do this in the morning her care taker has an appt tomorrow and she would like to get it done while they will be here   Please advise patient replies from provider may take up to 48 hours.

## 2024-10-24 NOTE — TELEPHONE ENCOUNTER
----- Message from Med Assistant Mares sent at 10/24/2024  3:02 PM CDT -----  Contact: [phone]  Mammogram already ordered. Can you also add labs for patient to come in before seeing blood doctor?  ----- Message -----  From: Winnie Lemos  Sent: 10/24/2024   2:37 PM CDT  To: Watsno Skinner Jr Staff    .[email or web address]     Patient is calling for Medical Advice regarding:mammo order     How long has patient had these symptoms:    Pharmacy name and phone#:    Patient wants a call back or thru [facility]:call back     Comments:  She is asking for her mammo order she states she would like to do this in the morning her care taker has an appt tomorrow and she would like to get it done while they will be here   Please advise patient replies from provider may take up to 48 hours.

## 2024-10-24 NOTE — TELEPHONE ENCOUNTER
Patient has follow up with me through [facility] in January with labs ordered. I don't need anything else. Mammogram order is also in place.

## 2024-11-07 ENCOUNTER — TELEPHONE (OUTPATIENT)
Dept: ORTHOPEDICS | Facility: CLINIC | Age: 79
End: 2024-11-07
Payer: MEDICARE

## 2024-11-07 DIAGNOSIS — M79.642 BILATERAL HAND PAIN: Primary | ICD-10-CM

## 2024-11-07 DIAGNOSIS — M79.641 BILATERAL HAND PAIN: Primary | ICD-10-CM

## 2024-11-07 NOTE — TELEPHONE ENCOUNTER
Spoke to the patient an was able to let her know that she is scheduled to see Dr. Navarro tomorrow morning an she can discuss this with him then the patient verbalized understanding.     ----- Message from Med Assistant Ana sent at 11/7/2024  3:48 PM CST -----  Contact: Rm/brother    ----- Message -----  From: Chey Stone  Sent: 11/7/2024   9:18 AM CST  To: Melanie Astorga Staff    Pt brother is calling in regards to having something called in for pain until appt on 11/08.please call back at .[phone]         .  [facility] [address] [facility]  [id number] [facility] [address]  Phone: [phone] Fax: [phone]    Thanks  AVE

## 2024-11-08 ENCOUNTER — HOSPITAL ENCOUNTER (OUTPATIENT)
Dept: RADIOLOGY | Facility: HOSPITAL | Age: 79
Discharge: HOME | End: 2024-11-08
Attending: ORTHOPAEDIC SURGERY
Payer: MEDICARE

## 2024-11-08 ENCOUNTER — OFFICE VISIT (OUTPATIENT)
Dept: ORTHOPEDICS | Facility: CLINIC | Age: 79
End: 2024-11-08
Payer: MEDICARE

## 2024-11-08 DIAGNOSIS — M18.0 ARTHRITIS OF CARPOMETACARPAL (CMC) JOINT OF BOTH THUMBS: ICD-10-CM

## 2024-11-08 DIAGNOSIS — G56.03 CARPAL TUNNEL SYNDROME ON BOTH SIDES: ICD-10-CM

## 2024-11-08 DIAGNOSIS — M65.30 TRIGGER FINGER, ACQUIRED: Primary | ICD-10-CM

## 2024-11-08 DIAGNOSIS — M79.642 BILATERAL HAND PAIN: ICD-10-CM

## 2024-11-08 DIAGNOSIS — M79.641 BILATERAL HAND PAIN: ICD-10-CM

## 2024-11-08 PROCEDURE — 99999 PR PBB SHADOW E&M-EST. PATIENT-LVL III: CPT | Mod: PBBFAC,HCNC,, | Performed by: ORTHOPAEDIC SURGERY

## 2024-11-08 PROCEDURE — 73130 X-RAY EXAM OF HAND: CPT | Mod: TC,50,HCNC

## 2024-11-08 PROCEDURE — 73130 X-RAY EXAM OF HAND: CPT | Mod: 26,50,HCNC, | Performed by: RADIOLOGY

## 2024-11-08 RX ORDER — TRIAMCINOLONE ACETONIDE 40 MG/ML
40 INJECTION, SUSPENSION INTRA-ARTICULAR; INTRAMUSCULAR
Status: DISCONTINUED | OUTPATIENT
Start: 2024-11-08 | End: 2024-11-08 | Stop reason: HOSPADM

## 2024-11-08 RX ADMIN — TRIAMCINOLONE ACETONIDE 40 MG: 40 INJECTION, SUSPENSION INTRA-ARTICULAR; INTRAMUSCULAR at 08:11

## 2024-11-08 NOTE — PROCEDURES
Small Joint Aspiration/Injection: R thumb CMC, L thumb CMC    Date/Time: 11/8/2024 8:15 AM    Performed by: Rizwan Navarro MD  Authorized by: Rizwan Navarro MD    Consent Done?:  Yes (Verbal)  Indications:  Pain and arthritis  Site marked: the procedure site was marked    Timeout: prior to procedure the correct patient, procedure, and site was verified    Prep: patient was prepped and draped in usual sterile fashion      Local anesthesia used?: Yes    Local anesthetic:  Lidocaine 2% without epinephrine  Anesthetic total (ml):  1    Location:  Thumb  Site:  R thumb CMC and L thumb CMC  Ultrasonic guidance for needle placement?: No    Needle size:  25 G  Approach:  Dorsal  Medications:  40 mg triamcinolone acetonide 40 mg/mL; 40 mg triamcinolone acetonide 40 mg/mL

## 2024-11-08 NOTE — PROCEDURES
Tendon Sheath    Date/Time: 11/8/2024 8:15 AM    Performed by: Rizwan Navarro MD  Authorized by: Rizwan Navarro MD    Consent Done?:  Yes (Verbal)  Indications:  Pain  Timeout: prior to procedure the correct patient, procedure, and site was verified    Prep: patient was prepped and draped in usual sterile fashion      Local anesthesia used?: Yes    Local anesthetic:  Lidocaine 2% without epinephrine  Anesthetic total (ml):  0.5    Location:  Thumb  Site:  L thumb flexor tendon sheath  Ultrasonic guidance for needle placement?: No    Needle size:  25 G  Approach:  Volar  Medications:  40 mg triamcinolone acetonide 40 mg/mL

## 2024-11-08 NOTE — PROCEDURES
Carpal Tunnel    Date/Time: 11/8/2024 8:15 AM    Performed by: Rizwan Navarro MD  Authorized by: Rizwan Navarro MD    Consent Done?:  Yes (Verbal)  Indications:  Pain  Timeout: prior to procedure the correct patient, procedure, and site was verified    Prep: patient was prepped and draped in usual sterile fashion      Local anesthesia used?: Yes    Local anesthetic:  Lidocaine 2% without epinephrine  Anesthetic total (ml):  2    Location:  Wrist  Site:  R carpal tunnel and L carpal tunnel  Ultrasonic Guidance for Needle Placement?: No    Needle size:  25 G  Approach:  Volar  Medications:  40 mg triamcinolone acetonide 40 mg/mL; 40 mg triamcinolone acetonide 40 mg/mL

## 2024-11-08 NOTE — PROGRESS NOTES
Subjective:     Patient ID: Shireen Felix is a 78 y.o. female.    Chief Complaint: Pain of the Right Hand and Pain of the Left Hand      HPI:  The patient is a 78-year-old female with bilateral thumb basal joint arthritis left trigger thumb and bilateral carpal tunnel syndrome.  She was last injected 03/21/2023 and requests reinjection today.  Nerve conduction studies showed bilateral carpal tunnel syndrome a right cubital tunnel syndrome and bilateral C4-T1 cervical radiculopathy.  She is not a surgical candidate because of heart failure.    Past Medical History:   Diagnosis Date    Abnormal ECG 8/5/2019    Aneurysm     Anticoagulant long-term use     Arthritis     CAD in native artery 8/5/2019    COPD (chronic obstructive pulmonary disease)     Diabetes mellitus     Fall 10/10/2019    Formatting of this note might be different from the original. Found sitting on floor next to bed last night Mild confusion today Does not recall falling or how she ended up on floor UA today Labs yesterday unremarkable    Glaucoma     Hypertension     Seizures     Shingles 05/27/2017    Stroke      Past Surgical History:   Procedure Laterality Date    BRAIN SURGERY      CARDIAC CATHETERIZATION      CATHETERIZATION OF BOTH LEFT AND RIGHT HEART N/A 5/17/2024    Procedure: CATHETERIZATION, HEART, BOTH LEFT AND RIGHT;  Surgeon: Rachelle Sarabia MD;  Location: [facility] CATH LAB;  Service: Cardiology;  Laterality: N/A;    COLONOSCOPY N/A 09/21/2023    Procedure: COLONOSCOPY;  Surgeon: Joanna Leal MD;  Location: [facility] ENDO;  Service: Endoscopy;  Laterality: N/A;    CORONARY ANGIOPLASTY      EPIDURAL STEROID INJECTION INTO CERVICAL SPINE N/A 2/7/2024    Procedure: Cervical IL XANDER, Level C6/7, RN IV sedation;  Surgeon: Francisco Oshea MD;  Location: [facility] PAIN MGT;  Service: Pain Management;  Laterality: N/A;    EPIDURAL STEROID INJECTION INTO LUMBAR SPINE Bilateral 11/12/2019    Procedure: TF XANDER L4/5;  Surgeon: Desean Dias,  MD;  Location: [facility] PAIN MGT;  Service: Pain Management;  Laterality: Bilateral;    HYSTERECTOMY      INJECTION OF ANESTHETIC AGENT AROUND MEDIAL BRANCH NERVES INNERVATING LUMBAR FACET JOINT Bilateral 01/31/2020    Procedure: Bilateral L3-5 MBB;  Surgeon: Desean Dias MD;  Location: [facility] PAIN MGT;  Service: Pain Management;  Laterality: Bilateral;    INJECTION OF ANESTHETIC AGENT INTO SACROILIAC JOINT Right 11/16/2021    Procedure: Right BLOCK, SACROILIAC JOINT Right GTB with RN IV sedation;  Surgeon: Yao Fulton MD;  Location: [facility] PAIN MGT;  Service: Pain Management;  Laterality: Right;    INJECTION OF ANESTHETIC AGENT INTO SACROILIAC JOINT Bilateral 07/28/2023    Procedure: Bilateral GT bursa + bilateral SIJ injection;  Surgeon: Francisco Oshea MD;  Location: [facility] PAIN MGT;  Service: Pain Management;  Laterality: Bilateral;    INJECTION OF JOINT Bilateral 07/09/2020    Procedure: Bilateral shoulder GH Joint injection with local;  Surgeon: Desean Dias MD;  Location: [facility] PAIN MGT;  Service: Pain Management;  Laterality: Bilateral;    INJECTION OF JOINT Bilateral 07/28/2023    Procedure: Bilateral GT bursa + bilateral SIJ injection NEEDS ADDITIONAL SEDATION AND MORE TIME BEFORE INJECTION RN IV Sedation;  Surgeon: Francisco Oshea MD;  Location: [facility] PAIN MGT;  Service: Pain Management;  Laterality: Bilateral;    INJECTION OF JOINT N/A 10/25/2023    Procedure: Sacrococcygeal joint injection;  Surgeon: Francisco Oshea MD;  Location: [facility] PAIN MGT;  Service: Pain Management;  Laterality: N/A;    OOPHORECTOMY      SELECTIVE INJECTION OF ANESTHETIC AGENT AROUND LUMBAR SPINAL NERVE ROOT BY TRANSFORAMINAL APPROACH Bilateral 04/26/2023    Procedure: Bilateral L4/5 TF XANDER RN IV Sedation;  Surgeon: Francisco Oshea MD;  Location: [facility] PAIN MGT;  Service: Pain Management;  Laterality: Bilateral;    TRANSFORAMINAL EPIDURAL INJECTION OF STEROID Bilateral 07/23/2019    Procedure: Bilateral L3/4 Transforaminal Epidural  Steroid Injection;  Surgeon: Desean Dias MD;  Location: [facility] PAIN T;  Service: Pain Management;  Laterality: Bilateral;    TRANSFORAMINAL EPIDURAL INJECTION OF STEROID Bilateral 03/10/2020    Procedure: Right T12/L1 TF XANDER with local;  Surgeon: Desean Dias MD;  Location: [facility] PAIN MGT;  Service: Pain Management;  Laterality: Bilateral;    TRANSFORAMINAL EPIDURAL INJECTION OF STEROID Right 2020    Procedure: Right T12/L1 TFESI Covid day of procedure;  Surgeon: Desean Dias MD;  Location: [facility] PAIN T;  Service: Pain Management;  Laterality: Right;     Family History   Problem Relation Name Age of Onset    No Known Problems Mother      No Known Problems Father      Breast cancer Maternal Aunt      Breast cancer Maternal Aunt      Breast cancer Maternal Aunt       Social History     Socioeconomic History    Marital status: Single   Tobacco Use    Smoking status: Former     Current packs/day: 0.00     Average packs/day: 1 pack/day for 42.3 years (42.3 ttl pk-yrs)     Types: Cigarettes     Start date:      Quit date: 2004     Years since quittin.5    Smokeless tobacco: Former   Substance and Sexual Activity    Alcohol use: No    Drug use: Never    Sexual activity: Not Currently     Partners: Male   Social History Narrative    No pets or smokers in household.     Social Drivers of Health     Financial Resource Strain: High Risk (2024)    Overall Financial Resource Strain (CARDIA)     Difficulty of Paying Living Expenses: Very hard   Food Insecurity: No Food Insecurity (2024)    Hunger Vital Sign     Worried About Running Out of Food in the Last Year: Never true     Ran Out of Food in the Last Year: Never true   Transportation Needs: No Transportation Needs (2024)    Received from [facility] Subsidiaries and Affiliates    PRAPARE - Transportation     Lack of Transportation (Medical): No     Lack of Transportation  (Non-Medical): No   Physical Activity: Unknown (7/30/2024)    Exercise Vital Sign     Days of Exercise per Week: 3 days   Recent Concern: Physical Activity - Inactive (6/17/2024)    [facility]    Exercise Vital Sign     Days of Exercise per Week: 0 days     Minutes of Exercise per Session: 0 min   Stress: Patient Declined (7/30/2024)    [employer] - Occupational Stress Questionnaire     Feeling of Stress : Patient declined   Recent Concern: Stress - Stress Concern Present (6/17/2024)    Received from [facility] and [facility]ates    [employer] - Occupational Stress Questionnaire     Feeling of Stress : Very much     Medication List with Changes/Refills   Current Medications    ALBUTEROL (PROVENTIL) 2.5 MG /3 ML (0.083 %) NEBULIZER SOLUTION    Take 3 mLs (2.5 mg total) by nebulization every 4 (four) hours as needed for Wheezing. Rescue    ALBUTEROL-BUDESONIDE (AIRSUPRA) 90-80 MCG/ACTUATION    Inhale 2 puffs into the lungs every 4 (four) hours as needed (wheezing, cough).    APTIOM 400 MG TAB TABLET    Take 400 mg by mouth.    ATORVASTATIN (LIPITOR) 40 MG TABLET    Take 1 tablet (40 mg total) by mouth once daily.    BISOPROLOL (ZEBETA) 5 MG TABLET    Take 1 tablet (5 mg total) by mouth once daily.    BLOOD SUGAR DIAGNOSTIC STRP    To check BG 1 times daily, to use with insurance preferred meter    BLOOD-GLUCOSE METER KIT    To check BG 1 times daily, to use with insurance preferred meter    CHOLECALCIFEROL, VITAMIN D3, 1,250 MCG (50,000 UNIT) CAPSULE    Take 1 capsule (50,000 Units total) by mouth every 7 days.    CHOLESTYRAMINE (QUESTRAN) 4 GRAM PACKET    TAKE 1 PACKET BY MOUTH TWICE DAILY AS DIRECTED    CLOPIDOGREL (PLAVIX) 75 MG TABLET    Take 1 tablet (75 mg total) by mouth once daily.    DENOSUMAB (PROLIA) 60  MG/ML SYRG    every 6 (six) months.    DICYCLOMINE (BENTYL) 10 MG CAPSULE    Take 1 capsule (10 mg total) by mouth 2 (two) times daily.    DORZOLAMIDE-TIMOLOL 2-0.5% (COSOPT) 22.3-6.8 MG/ML OPHTHALMIC SOLUTION    Place 1 drop into both eyes every 12 (twelve) hours.    EMPAGLIFLOZIN (JARDIANCE) 10 MG TABLET    Take 1 tablet (10 mg total) by mouth once daily.    ESCITALOPRAM OXALATE (LEXAPRO) 20 MG TABLET    Take 1 tablet (20 mg total) by mouth once daily.    FLUCONAZOLE (DIFLUCAN) 150 MG TAB    Take 1 tablet (150 mg total) by mouth once daily.    FLUOCINONIDE (LIDEX) 0.05 % EXTERNAL SOLUTION    Apply topically 2 (two) times daily. Use on itchy spots on scalp and ear    FLUTICASONE PROPIONATE (FLONASE) 50 MCG/ACTUATION NASAL SPRAY    2 sprays (100 mcg total) by Each Nostril route once daily.    FLUTICASONE-UMECLIDIN-VILANTER (TRELEGY ELLIPTA) 200-62.5-25 MCG INHALER    Inhale 1 puff into the lungs once daily.    FUROSEMIDE (LASIX) 20 MG TABLET    Take 1 tablet (20 mg total) by mouth daily as needed (for edema, swelling, fluid build up, or 3 pound weight gain in 24 hours).    GABAPENTIN (NEURONTIN) 300 MG CAPSULE    Take 1 capsule (300 mg total) by mouth 3 (three) times daily.    HYDROCODONE-ACETAMINOPHEN (NORCO) 5-325 MG PER TABLET    Take 1 tablet by mouth every 6 (six) hours as needed for Pain.    HYDROXYZINE HCL (ATARAX) 25 MG TABLET    Take 2 tablets (50 mg total) by mouth 2 (two) times daily as needed for Itching.    IBUPROFEN (ADVIL,MOTRIN) 800 MG TABLET    Take 1 tablet (800 mg total) by mouth 3 (three) times daily.    IPRATROPIUM (ATROVENT) 42 MCG (0.06 %) NASAL SPRAY    2 sprays by Each Nostril route 2 (two) times daily.    ISOSORBIDE MONONITRATE (IMDUR) 120 MG 24 HR TABLET    Take 1 tablet (120 mg total) by mouth every evening.    KETOCONAZOLE (NIZORAL) 2 % CREAM    Apply topically 2 (two) times daily. For dry flaky patches of ear.    LANCETS MISC    To check BG 1 times daily, to use with insurance preferred  meter    LATANOPROST 0.005 % OPHTHALMIC SOLUTION    Place 1 drop into both eyes every evening.    LEVETIRACETAM (KEPPRA) 1000 MG TABLET    Take 1 tablet (1,000 mg total) by mouth 2 (two) times daily.    LINEZOLID (ZYVOX) 600 MG TAB    Take 1 tablet (600 mg total) by mouth every 12 (twelve) hours.    MAGNESIUM OXIDE (MAG-OX) 400 MG (241.3 MG MAGNESIUM) TABLET    Take 1 tablet (400 mg total) by mouth once daily.    MONTELUKAST (SINGULAIR) 10 MG TABLET    Take 1 tablet (10 mg total) by mouth once daily.    MONTELUKAST (SINGULAIR) 10 MG TABLET    Take 1 tablet (10 mg total) by mouth once daily.    MUCUS CLEARING DEVICE (AEROBIKA OSCILLATING PEP SYSTM)    by Misc.(Non-Drug; Combo Route) route 4 (four) times daily.    MYRBETRIQ 50 MG TB24    TAKE 1 TABLET BY MOUTH ONCE DAILY    NITROGLYCERIN (NITROSTAT) 0.4 MG SL TABLET    Place 1 tablet (0.4 mg total) under the tongue every 5 (five) minutes as needed for Chest pain.    OMEPRAZOLE (PRILOSEC) 40 MG CAPSULE    Take 1 capsule (40 mg total) by mouth every morning.    ONDANSETRON (ZOFRAN-ODT) 4 MG TBDL    Take 1 tablet (4 mg total) by mouth every 6 (six) hours as needed.    PANTOPRAZOLE (PROTONIX) 40 MG TABLET    TAKE 1 TABLET BY MOUTH TWICE DAILY    POTASSIUM CHLORIDE SA (K-DUR,KLOR-CON) 20 MEQ TABLET    Take 1 tablet (20 mEq total) by mouth once daily. Take extra dose with Lasix - fluid pill    PRAMOXINE-HYDROCORTISONE (ANALPRAM HC) CREAM    Apply topically 3 (three) times daily.    PREDNISONE (DELTASONE) 10 MG TABLET    Take 1 tablet (10 mg total) by mouth once daily.    RANOLAZINE (RANEXA) 1,000 MG TB12    Take 1 tablet (1,000 mg total) by mouth 2 (two) times daily.    ROFLUMILAST (DALIRESP) 500 MCG TAB    TAKE ONE TABLET BY MOUTH DAILY    SACUBITRIL-VALSARTAN (ENTRESTO) 24-26 MG PER TABLET    Take 1 tablet by mouth 2 (two) times daily.    SODIUM CHLORIDE 3% 3 % NEBULIZER SOLUTION    Take 4 mLs by nebulization as needed for Cough (chest congestion).    SPIRONOLACTONE  (ALDACTONE) 50 MG TABLET    Take 1 tablet (50 mg total) by mouth once daily.    TEZEPELUMAB-EKKO 210 MG/1.91 ML (110 MG/ML) PNIJ    Inject 210 mg into the skin every 28 days.    TIZANIDINE (ZANAFLEX) 4 MG TABLET    TAKE 1 TABLET BY MOUTH EVERY TWELVE HOURS AS NEEDED    TRIAMCINOLONE ACETONIDE 0.025% (KENALOG) 0.025 % CREAM    Apply topically 2 (two) times daily. PRN rash and itching of ear. Mild steroid cream.     Review of patient's allergies indicates:   Allergen Reactions    Penicillins Anaphylaxis, Hives, Shortness Of Breath and Swelling    Penicillin     Adhesive Rash    Doxycycline Nausea Only    Oxycodone Other (See Comments)     Fatigue      Sulfa (sulfonamide antibiotics) Itching     Review of Systems   Constitutional: Positive for malaise/fatigue.   HENT:  Positive for hearing loss.    Eyes:  Negative for double vision and visual disturbance.   Cardiovascular:  Positive for chest pain, claudication and syncope.   Respiratory:  Positive for wheezing. Negative for shortness of breath.    Endocrine: Negative for cold intolerance.   Hematologic/Lymphatic: Does not bruise/bleed easily.   Skin:  Negative for poor wound healing and suspicious lesions.   Musculoskeletal:  Positive for arthritis, back pain, falls, gout, joint pain, joint swelling and neck pain.   Gastrointestinal:  Positive for diarrhea and heartburn. Negative for nausea and vomiting.   Genitourinary:  Positive for frequency. Negative for dysuria.   Neurological:  Positive for loss of balance, numbness, paresthesias, seizures, sensory change and vertigo.   Psychiatric/Behavioral:  Positive for depression and substance abuse. Negative for memory loss. The patient has insomnia. The patient is not nervous/anxious.    Allergic/Immunologic: Negative for persistent infections.       Objective:   There is no height or weight on file to calculate BMI.  There were no vitals filed for this visit.             General    Constitutional: She is oriented to  person, place, and time. She appears well-developed and well-nourished. No distress.   HENT:   Head: Normocephalic.   Eyes: EOM are normal.   Pulmonary/Chest: Effort normal.   Neurological: She is oriented to person, place, and time.   Psychiatric: She has a normal mood and affect.             Right Hand/Wrist Exam     Inspection   Scars: Wrist - absent Hand -  absent  Effusion: Wrist - absent Hand -  absent    Pain   Wrist - The patient exhibits pain of the CMC and flexor/pronator group.    Tests   Phalens sign: positive  Tinel's sign (median nerve): positive  Carpal Tunnel Compression Test: positive    Atrophy   Thenar:  negative  Intrinsic:  negative    Other     Neuorologic Exam    Median Distribution: abnormal  Ulnar Distribution: normal  Radial Distribution: normal    Comments:  The patient has a positive Tinel and positive Phalen sign.  There is no thenar atrophy noted.  She is tender about the basal joint right thumb with a positive axial circumduction grind test      Left Hand/Wrist Exam     Inspection   Scars: Wrist - absent Hand -  absent  Effusion: Wrist - absent Hand -  absent    Pain   Hand - The patient exhibits pain of the thumb MCP.  Wrist - The patient exhibits pain of the CMC and flexor/pronator group.    Tests   Phalens sign: positive  Tinel's sign (median nerve): positive  Carpal Tunnel Compression Test: positive    Atrophy  Thenar:  Negative  Intrinsic: negative    Other     Sensory Exam  Median Distribution: abnormal  Ulnar Distribution: normal  Radial Distribution: normal    Comments:  The patient has a positive Tinel and positive Phalen sign.  There is no thenar atrophy noted.  She is tender about the basal joint left thumb with a positive axial circumduction grind test.  She has active triggering left thumb with a palpable nodule that moves with tendon excursion.          Vascular Exam       Capillary Refill  Right Hand: normal capillary refill  Left Hand: normal capillary  refill          Relevant imaging results reviewed and interpreted by me, discussed with the patient and / or family today radiographs both wrist showed bilateral thumb basal joint arthritis  Assessment:     Encounter Diagnoses   Name Primary?    Trigger finger, acquired Yes    Arthritis of carpometacarpal (CMC) joint of both thumbs     Carpal tunnel syndrome on both sides         Plan:       The patient injected left trigger thumb with 0.5 cc of Kenalog and 0.5 cc of 2% plain lidocaine under sterile technique.  She was injected bilateral thumb basal joints each with 0.5 cc of Kenalog and 0.5 cc of 2% plain lidocaine.  She was injected in both carpal tunnels each with 1 cc Kenalog and 1 cc 2% plain lidocaine.  She will wait at least 3 months between injections.              Disclaimer: This note was prepared using a voice recognition system and is likely to have sound alike errors within the text.

## 2024-11-21 ENCOUNTER — HOSPITAL ENCOUNTER (OUTPATIENT)
Dept: RADIOLOGY | Facility: HOSPITAL | Age: 79
Discharge: HOME | End: 2024-11-21
Attending: INTERNAL MEDICINE
Payer: MEDICARE

## 2024-11-21 ENCOUNTER — OFFICE VISIT (OUTPATIENT)
Dept: PULMONOLOGY | Facility: CLINIC | Age: 79
End: 2024-11-21
Attending: INTERNAL MEDICINE
Payer: MEDICARE

## 2024-11-21 VITALS
SYSTOLIC BLOOD PRESSURE: 114 MMHG | HEART RATE: 67 BPM | RESPIRATION RATE: 15 BRPM | HEIGHT: 64 IN | OXYGEN SATURATION: 98 % | BODY MASS INDEX: 18.18 KG/M2 | DIASTOLIC BLOOD PRESSURE: 68 MMHG | WEIGHT: 106.5 LBS

## 2024-11-21 DIAGNOSIS — I25.118 CORONARY ARTERY DISEASE OF NATIVE ARTERY OF NATIVE HEART WITH STABLE ANGINA PECTORIS: ICD-10-CM

## 2024-11-21 DIAGNOSIS — I50.32 CHRONIC DIASTOLIC HEART FAILURE: ICD-10-CM

## 2024-11-21 DIAGNOSIS — J84.112 UIP (USUAL INTERSTITIAL PNEUMONITIS): Primary | ICD-10-CM

## 2024-11-21 DIAGNOSIS — J84.9 INTERSTITIAL PULMONARY DISEASE, UNSPECIFIED: ICD-10-CM

## 2024-11-21 PROBLEM — J45.50 SEVERE PERSISTENT ASTHMA WITHOUT COMPLICATION: Status: RESOLVED | Noted: 2024-04-29 | Resolved: 2024-11-21

## 2024-11-21 PROCEDURE — 3072F LOW RISK FOR RETINOPATHY: CPT | Mod: HCNC,CPTII,S$GLB, | Performed by: INTERNAL MEDICINE

## 2024-11-21 PROCEDURE — 3078F DIAST BP <80 MM HG: CPT | Mod: HCNC,CPTII,S$GLB, | Performed by: INTERNAL MEDICINE

## 2024-11-21 PROCEDURE — 1101F PT FALLS ASSESS-DOCD LE1/YR: CPT | Mod: HCNC,CPTII,S$GLB, | Performed by: INTERNAL MEDICINE

## 2024-11-21 PROCEDURE — 1126F AMNT PAIN NOTED NONE PRSNT: CPT | Mod: HCNC,CPTII,S$GLB, | Performed by: INTERNAL MEDICINE

## 2024-11-21 PROCEDURE — 99214 OFFICE O/P EST MOD 30 MIN: CPT | Mod: HCNC,S$GLB,, | Performed by: INTERNAL MEDICINE

## 2024-11-21 PROCEDURE — 1159F MED LIST DOCD IN RCRD: CPT | Mod: HCNC,CPTII,S$GLB, | Performed by: INTERNAL MEDICINE

## 2024-11-21 PROCEDURE — 71250 CT THORAX DX C-: CPT | Mod: TC,HCNC

## 2024-11-21 PROCEDURE — 71250 CT THORAX DX C-: CPT | Mod: 26,HCNC,, | Performed by: RADIOLOGY

## 2024-11-21 PROCEDURE — 3288F FALL RISK ASSESSMENT DOCD: CPT | Mod: HCNC,CPTII,S$GLB, | Performed by: INTERNAL MEDICINE

## 2024-11-21 PROCEDURE — 3074F SYST BP LT 130 MM HG: CPT | Mod: HCNC,CPTII,S$GLB, | Performed by: INTERNAL MEDICINE

## 2024-11-21 PROCEDURE — 99999 PR PBB SHADOW E&M-EST. PATIENT-LVL V: CPT | Mod: PBBFAC,HCNC,, | Performed by: INTERNAL MEDICINE

## 2024-11-21 PROCEDURE — 1160F RVW MEDS BY RX/DR IN RCRD: CPT | Mod: HCNC,CPTII,S$GLB, | Performed by: INTERNAL MEDICINE

## 2024-11-21 NOTE — PROGRESS NOTES
Subjective:      Patient ID: Shireen Felix is a 78 y.o. female.    Chief Complaint: Review CT Scan     HPI    May 2024  78-year-old female with chronic systolic heart failure with ejection fraction of 30-40% as well as some pulmonary fibrosis in a UIP pattern which is mild. Patient has been followed in this office for a number of years by multiple providers. She is also followed closely by Cardiology and had a recent left heart catheterization which showed elevated left-sided filling pressures but nonocclusive coronary disease. She had a right heart catheterization done at that time which was normal. Here for re-evaluation of her fibrotic lung disease and consideration of antifibrotic therapy. I have also reviewed her prior 6 minute walk test and all of them have showed significant heart rate limitation with exercise and chronotropic incompetence. Pulmonary function tests done today for review as well. She is here today with her brother. She is quite frail and weak and gets very breathless with minimal activity.     November 2024  Here today for follow up of the above  States that over the last several months her shortness of breath has been much better  Main complaint is of some hoarseness and frequent airway congestion  No new pulmonary complaints  CT chest done today for review    Review of Systems as per history of present illness otherwise negative  Objective:     Physical Exam   Constitutional: She is oriented to person, place, and time. She appears well-developed. No distress.   HENT:   Head: Normocephalic.   Cardiovascular: Normal rate.   Murmur heard.  Pulmonary/Chest: Normal expansion, symmetric chest wall expansion and effort normal. She has no wheezes. She has rales.   Musculoskeletal:      Cervical back: Neck supple.   Neurological: She is alert and oriented to person, place, and time.   Psychiatric: She has a normal mood and affect.   Nursing note and vitals reviewed.          11/21/2024    10:06  "AM 9/26/2024     9:37 AM 9/4/2024    10:21 AM 9/4/2024     9:17 AM 8/1/2024     9:18 AM 7/17/2024    10:09 AM 6/11/2024    10:40 AM   Pulmonary Function Tests   SpO2 98 %  92 % 99 %  99 %    Height 5' 4" (1.626 m) 5' 4" (1.626 m) 5' 4" (1.626 m)  5' 4" (1.626 m) 5' 4" (1.626 m)    Weight 48.3 kg (106 lb 7.7 oz)  46.4 kg (102 lb 4.7 oz) 47.7 kg (105 lb 4.3 oz) 45.8 kg (101 lb) 46.5 kg (102 lb 8.2 oz) --   BMI (Calculated) 18.3  17.5  17.3 17.6         Assessment:     1. UIP (usual interstitial pneumonitis)    2. Chronic diastolic heart failure    3. Coronary artery disease of native artery of native heart with stable angina pectoris      I personally reviewed CT images obtained today.  My interpretation is:  Peripheral fibrotic changes with some basilar honeycombing virtually identical to prior study in May of this year.  It is also unchanged going back several years  No new detrimental findings    Plan:     Stable fibrotic lung disease that has not progressed in a number of years  PFTs done in May were essentially normal  Reassured her that she does not have COPD or obstructive lung disease  Recommended Mucinex DM for her cough and congestive symptoms  Inhaled bronchodilators not likely to be of any benefit  Continue to follow up with Cardiology and primary care  Can follow up with us in 1 year, sooner if needed    "

## 2024-11-26 DIAGNOSIS — M79.18 MYOFASCIAL MUSCLE PAIN: ICD-10-CM

## 2024-11-26 DIAGNOSIS — G89.4 CHRONIC PAIN DISORDER: ICD-10-CM

## 2024-11-26 RX ORDER — TIZANIDINE 4 MG/1
TABLET ORAL
Qty: 60 TABLET | Refills: 1 | Status: SHIPPED | OUTPATIENT
Start: 2024-11-26

## 2024-12-05 ENCOUNTER — OFFICE VISIT (OUTPATIENT)
Dept: ALLERGY | Facility: CLINIC | Age: 79
End: 2024-12-05
Payer: MEDICARE

## 2024-12-05 VITALS
OXYGEN SATURATION: 100 % | BODY MASS INDEX: 18.51 KG/M2 | HEIGHT: 64 IN | TEMPERATURE: 98 F | SYSTOLIC BLOOD PRESSURE: 110 MMHG | WEIGHT: 108.44 LBS | DIASTOLIC BLOOD PRESSURE: 61 MMHG | HEART RATE: 78 BPM

## 2024-12-05 DIAGNOSIS — J45.50 SEVERE PERSISTENT ASTHMA WITHOUT COMPLICATION: Primary | ICD-10-CM

## 2024-12-05 DIAGNOSIS — L29.89 NASAL PRURITUS: ICD-10-CM

## 2024-12-05 DIAGNOSIS — J34.89 NASAL SEPTAL PERFORATION: ICD-10-CM

## 2024-12-05 DIAGNOSIS — T50.905D ADVERSE EFFECT OF DRUG, SUBSEQUENT ENCOUNTER: ICD-10-CM

## 2024-12-05 DIAGNOSIS — J31.0 CHRONIC NONALLERGIC RHINITIS: ICD-10-CM

## 2024-12-05 LAB
FEF 25 75 LLN: 0.87
FEF 25 75 PRE REF: 124.5 %
FEF 25 75 REF: 2.27
FEV05 LLN: 0.72
FEV05 REF: 1.58
FEV1 FVC LLN: 62
FEV1 FVC PRE REF: 112.1 %
FEV1 FVC REF: 77
FEV1 LLN: 1.35
FEV1 PRE REF: 110.9 %
FEV1 REF: 1.93
FEV1FVCZSCORE: 1.19
FEV1ZSCORE: 0.62
FVC LLN: 1.78
FVC PRE REF: 97.6 %
FVC REF: 2.54
FVCZSCORE: -0.13
PEF LLN: 3.18
PEF PRE REF: 71.8 %
PEF REF: 4.89
PRE FEF 25 75: 2.82 L/S (ref 0.87–3.66)
PRE FET 100: 6.29 SEC
PRE FEV05 REF: 104.3 %
PRE FEV1 FVC: 86.35 % (ref 62.36–89.71)
PRE FEV1: 2.14 L (ref 1.35–2.49)
PRE FEV5: 1.65 L (ref 0.72–2.43)
PRE FVC: 2.48 L (ref 1.78–3.35)
PRE PEF: 3.51 L/S (ref 3.18–6.6)

## 2024-12-05 PROCEDURE — 99999 PR PBB SHADOW E&M-EST. PATIENT-LVL V: CPT | Mod: PBBFAC,HCNC,, | Performed by: STUDENT IN AN ORGANIZED HEALTH CARE EDUCATION/TRAINING PROGRAM

## 2024-12-05 RX ORDER — CROMOLYN SODIUM 5.2 MG/ML
1 SPRAY, METERED NASAL 4 TIMES DAILY
Qty: 13 ML | Refills: 11 | Status: SHIPPED | OUTPATIENT
Start: 2024-12-05 | End: 2025-12-05

## 2024-12-05 NOTE — ASSESSMENT & PLAN NOTE
- Adding Nasalcrom at this time   - Educated on proper use of intranasal sprays   - Will continue to monitor and reassess

## 2024-12-05 NOTE — ASSESSMENT & PLAN NOTE
- Continue current medications   - Interval improvement with Tezspire   - Educated on proper use of inhalers including an in-person demonstration of proper technique  - Expressed understanding of demonstrated technique  - ED precautions discussed at length   - Will continue to monitor and reassess

## 2024-12-05 NOTE — PROGRESS NOTES
Allergy and Immunology  Procedure Note     Spirometry  Date FEV1 (L)  (% predicted) FVC (L)  (% predicted) FEV1/ FVC HHA07-64%  (% predicted)   12/05/2024 111% 98% 86% 125%                 Interpretation: Normal spirometry. Non-obstructive ratio. Non-obstructive scooping.     Rajani Irwin MD   [facility]  Allergy and Clinical Immunology

## 2024-12-05 NOTE — PROGRESS NOTES
Allergy and Immunology  Established Patient Clinic Note    Date: 12/5/2024  Chief Complaint   Patient presents with    Follow-up    Breathing Problem     History  Shireen Felix is a 78 y.o. female being seen for follow-up today.    Chronic Non-Allergic Rhinitis  Nasal Pruritus/Nasal Septal Perforation   - Patient with an interval improvement reported since prior appointment  - Reported some pruritus - trying Nasalcrom at this time       Severe Persistent Asthma/COPD  Pulmonary Fibrosis     - Overall improvement in symptoms and QOL since starting Tezspire   - Bee noting patient's overall improvement is due to multidisciplinary interventions  - Patient to continue inhalers and Tezspire at this time      Prior HPI:   - Onset: Asthma appears to have began in childhood   - Hx of smoking with COPD and also pulmonary fibrosis   - Multifactorial disease affecting quality of life   - Patient with multiple admissions and oral steroids for resp exacerbation   - Not controled despite inhalers and escalation to biologics indicated  - Eosinophils zero but on steroids      Drug Allergy  - PCN/Bactrim to be discussed at next appointment  - Planning for potential oral challenge after discussion of risk v benefits     Allergies, PMH, PSH, Social, and Family History were reviewed.    Current Outpatient Medications on File Prior to Visit   Medication Sig Dispense Refill    albuterol (PROVENTIL) 2.5 mg /3 mL (0.083 %) nebulizer solution Take 3 mLs (2.5 mg total) by nebulization every 4 (four) hours as needed for Wheezing. Rescue 180 mL 11    albuterol-budesonide (AIRSUPRA) 90-80 mcg/actuation Inhale 2 puffs into the lungs every 4 (four) hours as needed (wheezing, cough). 10.7 g 11    APTIOM 400 mg Tab tablet Take 400 mg by mouth.      atorvastatin (LIPITOR) 40 MG tablet Take 1 tablet (40 mg total) by mouth once daily. 90 tablet 1    bisoprolol (ZEBETA) 5 MG tablet Take 1 tablet (5 mg total) by mouth once daily. 30 tablet 11     blood sugar diagnostic Strp To check BG 1 times daily, to use with insurance preferred meter 100 strip 3    blood-glucose meter kit To check BG 1 times daily, to use with insurance preferred meter 1 each 0    cholecalciferol, vitamin D3, 1,250 mcg (50,000 unit) capsule Take 1 capsule (50,000 Units total) by mouth every 7 days. 12 capsule 0    cholestyramine (QUESTRAN) 4 gram packet TAKE 1 PACKET BY MOUTH TWICE DAILY AS DIRECTED 60 packet 11    clopidogreL (PLAVIX) 75 mg tablet Take 1 tablet (75 mg total) by mouth once daily. 90 tablet 3    denosumab (PROLIA) 60 mg/mL Syrg every 6 (six) months.      dicyclomine (BENTYL) 10 MG capsule Take 1 capsule (10 mg total) by mouth 2 (two) times daily. 10 capsule 0    empagliflozin (JARDIANCE) 10 mg tablet Take 1 tablet (10 mg total) by mouth once daily. 90 tablet 3    EScitalopram oxalate (LEXAPRO) 20 MG tablet Take 1 tablet (20 mg total) by mouth once daily. 90 tablet 2    fluconazole (DIFLUCAN) 150 MG Tab Take 1 tablet (150 mg total) by mouth once daily. 2 tablet 0    fluocinonide (LIDEX) 0.05 % external solution Apply topically 2 (two) times daily. Use on itchy spots on scalp and ear 60 mL 2    fluticasone propionate (FLONASE) 50 mcg/actuation nasal spray 2 sprays (100 mcg total) by Each Nostril route once daily. 16 g 11    fluticasone-umeclidin-vilanter (TRELEGY ELLIPTA) 200-62.5-25 mcg inhaler Inhale 1 puff into the lungs once daily. 60 each 11    furosemide (LASIX) 20 MG tablet Take 1 tablet (20 mg total) by mouth daily as needed (for edema, swelling, fluid build up, or 3 pound weight gain in 24 hours). 30 tablet 11    gabapentin (NEURONTIN) 300 MG capsule Take 1 capsule (300 mg total) by mouth 3 (three) times daily. 90 capsule 1    HYDROcodone-acetaminophen (NORCO) 5-325 mg per tablet Take 1 tablet by mouth every 6 (six) hours as needed for Pain.      hydrOXYzine HCL (ATARAX) 25 MG tablet Take 2 tablets (50 mg total) by mouth 2 (two) times daily as needed for Itching.  60 tablet 11    ibuprofen (ADVIL,MOTRIN) 800 MG tablet Take 1 tablet (800 mg total) by mouth 3 (three) times daily. 60 tablet 0    ipratropium (ATROVENT) 42 mcg (0.06 %) nasal spray 2 sprays by Each Nostril route 2 (two) times daily. 15 mL 11    isosorbide mononitrate (IMDUR) 120 MG 24 hr tablet Take 1 tablet (120 mg total) by mouth every evening. 90 tablet 3    ketoconazole (NIZORAL) 2 % cream Apply topically 2 (two) times daily. For dry flaky patches of ear. 30 g 1    lancets [facility] [place] BG 1 times daily, to use with insurance preferred meter 100 each 3    levETIRAcetam (KEPPRA) 1000 MG tablet Take 1 tablet (1,000 mg total) by mouth 2 (two) times daily. 60 tablet 11    linezolid (ZYVOX) 600 mg Tab Take 1 tablet (600 mg total) by mouth every 12 (twelve) hours. 14 tablet 0    magnesium oxide (MAG-OX) 400 mg (241.3 mg magnesium) tablet Take 1 tablet (400 mg total) by mouth once daily. 90 tablet 1    montelukast (SINGULAIR) 10 mg tablet Take 1 tablet (10 mg total) by mouth once daily. 90 tablet 3    montelukast (SINGULAIR) 10 mg tablet Take 1 tablet (10 mg total) by mouth once daily. 90 tablet 3    mucus clearing device (AEROBIKA OSCILLATING PEP SYSTM) by Misc.(Non-Drug; Combo Route) route 4 (four) times daily. 1 each 0    nitroGLYCERIN (NITROSTAT) 0.4 MG SL tablet Place 1 tablet (0.4 mg total) under the tongue every 5 (five) minutes as needed for Chest pain. 100 tablet 0    omeprazole (PRILOSEC) 40 MG capsule Take 1 capsule (40 mg total) by mouth every morning. 90 capsule 3    ondansetron (ZOFRAN-ODT) 4 MG TbDL Take 1 tablet (4 mg total) by mouth every 6 (six) hours as needed. 30 tablet 0    pantoprazole (PROTONIX) 40 MG tablet TAKE 1 TABLET BY MOUTH TWICE DAILY 60 tablet 11    potassium chloride SA (K-DUR,KLOR-CON) 20 MEQ tablet Take 1 tablet (20 mEq total) by mouth once daily. Take extra dose with Lasix - fluid pill 90 tablet 1    pramoxine-hydrocortisone (ANALPRAM HC) cream Apply topically 3 (three) times daily.  57 g 11    predniSONE (DELTASONE) 10 MG tablet Take 1 tablet (10 mg total) by mouth once daily. 30 tablet 0    ranolazine (RANEXA) 1,000 mg Tb12 Take 1 tablet (1,000 mg total) by mouth 2 (two) times daily. 180 tablet 3    roflumilast (DALIRESP) 500 mcg Tab TAKE ONE TABLET BY MOUTH DAILY 90 tablet 3    sacubitriL-valsartan (ENTRESTO) 24-26 mg per tablet Take 1 tablet by mouth 2 (two) times daily. 180 tablet 3    spironolactone (ALDACTONE) 50 MG tablet Take 1 tablet (50 mg total) by mouth once daily. 90 tablet 1    tezepelumab-ekko 210 mg/1.91 mL (110 mg/mL) PnIj Inject 210 mg into the skin every 28 days. 1.91 mL 11    tiZANidine (ZANAFLEX) 4 MG tablet TAKE 1 TABLET BY MOUTH EVERY TWELVE HOURS AS NEEDED 60 tablet 1    triamcinolone acetonide 0.025% (KENALOG) 0.025 % cream Apply topically 2 (two) times daily. PRN rash and itching of ear. Mild steroid cream. 30 g 0    dorzolamide-timolol 2-0.5% (COSOPT) 22.3-6.8 mg/mL ophthalmic solution Place 1 drop into both eyes every 12 (twelve) hours. 10 mL 3    latanoprost 0.005 % ophthalmic solution Place 1 drop into both eyes every evening. 2.5 mL 3    MYRBETRIQ 50 mg Tb24 TAKE 1 TABLET BY MOUTH ONCE DAILY (Patient not taking: Reported on 5/8/2024) 30 tablet 0    sodium chloride 3% 3 % nebulizer solution Take 4 mLs by nebulization as needed for Cough (chest congestion). 360 mL 11     No current facility-administered medications on file prior to visit.     Physical Examination  Vitals:    12/05/24 0943   BP: 110/61   Pulse: 78   Temp: 97.7 °F (36.5 °C)     GENERAL:  female in no apparent distress and well developed and well nourished  HEAD:  Normocephalic, without obvious abnormality, atraumatic  EYES: sclera anicteric, conjunctiva normochromic  EARS: normal TM's and external ear canals both ears  NOSE: Septal perforation - anterior with dried blood and crusting   OROPHARYNX: moist mucous membranes without erythema, exudates or petechiae  LYMPH NODES: normal, supple, no  lymphadenopathy  LUNGS: clear to auscultation, no wheezes, rales or rhonchi, symmetric air entry.  HEART: normal rate, regular rhythm, normal S1, S2, no murmurs, rubs, clicks or gallops.  ABDOMEN: soft, nontender, nondistended, no masses or organomegaly.  MUSCULOSKELETAL: no gross joint deformity or swelling.  NEURO: alert, oriented, normal speech, no focal findings or movement disorder noted.  SKIN: normal coloration and turgor, no rashes, no suspicious skin lesions noted.     Assessment/Plan:   Problem List Items Addressed This Visit       Nasal septal perforation    Chronic nonallergic rhinitis    Overview     - 04/29/2024: Serum IgE to Zone 6 Aeroallergens negative          Current Assessment & Plan     - Adding Nasalcrom at this time   - Educated on proper use of intranasal sprays   - Will continue to monitor and reassess          Drug reaction    Overview     - PCN/Bactrim to be discussed at next appointment  - Planning for potential oral challenge after discussion of risk v benefits          Severe persistent asthma without complication - Primary    Current Assessment & Plan     - Continue current medications   - Interval improvement with Tezspire   - Educated on proper use of inhalers including an in-person demonstration of proper technique  - Expressed understanding of demonstrated technique  - ED precautions discussed at length   - Will continue to monitor and reassess         Relevant Orders    Spirometry with/without bronchodilator (Completed)    Nasal pruritus    Relevant Medications    cromolyn (NASALCROM) 5.2 mg/spray (4 %) nasal spray     Follow up:  Follow up in about 4 months (around 4/5/2025).    Greater than 40 minutes spent on the complex healthcare of this patient.   Additional timing to discuss goals, QOL, medication management, and ED precautions.     Rajani Irwin MD   [facility]  Allergy and Immunology

## 2024-12-12 ENCOUNTER — TELEPHONE (OUTPATIENT)
Dept: ORTHOPEDICS | Facility: CLINIC | Age: 79
End: 2024-12-12
Payer: MEDICARE

## 2024-12-12 NOTE — TELEPHONE ENCOUNTER
Called and spoke with patient - informed her that dr. Navarro is out of the office until 12/31/24 - patient stated she would call back at that time   Verbalized understanding and thankful for call

## 2024-12-12 NOTE — TELEPHONE ENCOUNTER
----- Message from Steven sent at 12/12/2024  3:39 PM CST -----  Contact: sejal Iyer is requesting a call back in regards to pain patches for her hand.   Please give her a call back at [phone]

## 2024-12-19 ENCOUNTER — TELEPHONE (OUTPATIENT)
Dept: FAMILY MEDICINE | Facility: CLINIC | Age: 79
End: 2024-12-19
Payer: MEDICARE

## 2024-12-19 NOTE — TELEPHONE ENCOUNTER
----- Message from Beeline sent at 12/19/2024  4:56 PM CST -----  Contact: Shireen  Type:  Sooner Apoointment Request    Caller is requesting a sooner appointment.  Caller declined first available appointment listed below.  Caller will not accept being placed on the waitlist and is requesting a message be sent to doctor.  Name of Caller:Shireen  When is the first available appointment?12/24/2024  Symptoms:Sore throat, head congestion and dripping into her lungs.  Would the patient rather a call back or a response via [facility]? Call back  Best Call Back Number:[phone]  Additional Information:

## 2024-12-30 ENCOUNTER — TELEPHONE (OUTPATIENT)
Dept: PULMONOLOGY | Facility: CLINIC | Age: 79
End: 2024-12-30
Payer: MEDICARE

## 2024-12-31 ENCOUNTER — HOSPITAL ENCOUNTER (INPATIENT)
Facility: HOSPITAL | Age: 79
LOS: 27 days | Discharge: HOME HEALTH | DRG: 177 | End: 2025-01-28
Attending: EMERGENCY MEDICINE | Admitting: STUDENT IN AN ORGANIZED HEALTH CARE EDUCATION/TRAINING PROGRAM
Payer: MEDICARE

## 2024-12-31 ENCOUNTER — OFFICE VISIT (OUTPATIENT)
Dept: PULMONOLOGY | Facility: CLINIC | Age: 79
End: 2024-12-31
Payer: MEDICARE

## 2024-12-31 ENCOUNTER — TELEPHONE (OUTPATIENT)
Dept: PULMONOLOGY | Facility: CLINIC | Age: 79
End: 2024-12-31
Payer: MEDICARE

## 2024-12-31 VITALS
RESPIRATION RATE: 20 BRPM | HEART RATE: 84 BPM | SYSTOLIC BLOOD PRESSURE: 125 MMHG | DIASTOLIC BLOOD PRESSURE: 60 MMHG | BODY MASS INDEX: 18.51 KG/M2 | OXYGEN SATURATION: 94 % | WEIGHT: 108.44 LBS | HEIGHT: 64 IN

## 2024-12-31 DIAGNOSIS — J84.112 UIP (USUAL INTERSTITIAL PNEUMONITIS): ICD-10-CM

## 2024-12-31 DIAGNOSIS — I50.43 ACUTE ON CHRONIC COMBINED SYSTOLIC AND DIASTOLIC CONGESTIVE HEART FAILURE: ICD-10-CM

## 2024-12-31 DIAGNOSIS — U07.1 COVID-19: ICD-10-CM

## 2024-12-31 DIAGNOSIS — R06.00 DYSPNEA: ICD-10-CM

## 2024-12-31 DIAGNOSIS — J21.9 ACUTE BRONCHITIS AND BRONCHIOLITIS: Primary | ICD-10-CM

## 2024-12-31 DIAGNOSIS — J44.1 ACUTE EXACERBATION OF CHRONIC OBSTRUCTIVE PULMONARY DISEASE (COPD): ICD-10-CM

## 2024-12-31 DIAGNOSIS — N28.9 ACUTE RENAL INSUFFICIENCY: ICD-10-CM

## 2024-12-31 DIAGNOSIS — R06.89 ACUTE RESPIRATORY INSUFFICIENCY: ICD-10-CM

## 2024-12-31 DIAGNOSIS — J96.21 ACUTE ON CHRONIC RESPIRATORY FAILURE WITH HYPOXIA: ICD-10-CM

## 2024-12-31 DIAGNOSIS — J20.9 ACUTE BRONCHITIS AND BRONCHIOLITIS: Primary | ICD-10-CM

## 2024-12-31 DIAGNOSIS — U07.1 LAB TEST POSITIVE FOR DETECTION OF COVID-19 VIRUS: Primary | ICD-10-CM

## 2024-12-31 DIAGNOSIS — E83.42 HYPOMAGNESEMIA: ICD-10-CM

## 2024-12-31 DIAGNOSIS — R20.2 NUMBNESS AND TINGLING: ICD-10-CM

## 2024-12-31 DIAGNOSIS — Z51.5 ENCOUNTER FOR PALLIATIVE CARE: ICD-10-CM

## 2024-12-31 DIAGNOSIS — E87.6 HYPOKALEMIA: ICD-10-CM

## 2024-12-31 DIAGNOSIS — R20.0 NUMBNESS AND TINGLING: ICD-10-CM

## 2024-12-31 DIAGNOSIS — R07.9 CHEST PAIN: ICD-10-CM

## 2024-12-31 DIAGNOSIS — I50.9 CHF (CONGESTIVE HEART FAILURE): ICD-10-CM

## 2024-12-31 LAB
ADENOVIRUS: NOT DETECTED
ALBUMIN SERPL BCP-MCNC: 2.9 G/DL (ref 3.5–5.2)
ALP SERPL-CCNC: 45 U/L (ref 40–150)
ALT SERPL W/O P-5'-P-CCNC: 10 U/L (ref 10–44)
ANION GAP SERPL CALC-SCNC: 14 MMOL/L (ref 8–16)
APTT PPP: 29.2 SEC (ref 21–32)
AST SERPL-CCNC: 12 U/L (ref 10–40)
BACTERIA #/AREA URNS HPF: ABNORMAL /HPF
BASOPHILS # BLD AUTO: 0.03 K/UL (ref 0–0.2)
BASOPHILS NFR BLD: 0.3 % (ref 0–1.9)
BILIRUB SERPL-MCNC: 0.3 MG/DL (ref 0.1–1)
BILIRUB UR QL STRIP: NEGATIVE
BNP SERPL-MCNC: 92 PG/ML (ref 0–99)
BORDETELLA PARAPERTUSSIS (IS1001): NOT DETECTED
BORDETELLA PERTUSSIS (PTXP): NOT DETECTED
BUN SERPL-MCNC: 34 MG/DL (ref 8–23)
CALCIUM SERPL-MCNC: 8.3 MG/DL (ref 8.7–10.5)
CHLAMYDIA PNEUMONIAE: NOT DETECTED
CHLORIDE SERPL-SCNC: 110 MMOL/L (ref 95–110)
CK SERPL-CCNC: 81 U/L (ref 20–180)
CLARITY UR: CLEAR
CO2 SERPL-SCNC: 16 MMOL/L (ref 23–29)
COLOR UR: YELLOW
CORONAVIRUS 229E, COMMON COLD VIRUS: NOT DETECTED
CORONAVIRUS HKU1, COMMON COLD VIRUS: NOT DETECTED
CORONAVIRUS NL63, COMMON COLD VIRUS: NOT DETECTED
CORONAVIRUS OC43, COMMON COLD VIRUS: NOT DETECTED
CREAT SERPL-MCNC: 1.5 MG/DL (ref 0.5–1.4)
D DIMER PPP IA.FEU-MCNC: 0.77 MG/L FEU
DIFFERENTIAL METHOD BLD: ABNORMAL
EOSINOPHIL # BLD AUTO: 0 K/UL (ref 0–0.5)
EOSINOPHIL NFR BLD: 0.3 % (ref 0–8)
ERYTHROCYTE [DISTWIDTH] IN BLOOD BY AUTOMATED COUNT: 14.3 % (ref 11.5–14.5)
ERYTHROCYTE [SEDIMENTATION RATE] IN BLOOD BY WESTERGREN METHOD: 60 MM/HR (ref 0–36)
EST. GFR  (NO RACE VARIABLE): 35 ML/MIN/1.73 M^2
FERRITIN SERPL-MCNC: 146 NG/ML (ref 20–300)
FLUBV RNA NPH QL NAA+NON-PROBE: NOT DETECTED
GLUCOSE SERPL-MCNC: 117 MG/DL (ref 70–110)
GLUCOSE UR QL STRIP: ABNORMAL
HCT VFR BLD AUTO: 37 % (ref 37–48.5)
HCV AB SERPL QL IA: NEGATIVE
HEP C VIRUS HOLD SPECIMEN: NORMAL
HGB BLD-MCNC: 11.9 G/DL (ref 12–16)
HGB UR QL STRIP: NEGATIVE
HIV 1+2 AB+HIV1 P24 AG SERPL QL IA: NEGATIVE
HPIV1 RNA NPH QL NAA+NON-PROBE: NOT DETECTED
HPIV2 RNA NPH QL NAA+NON-PROBE: NOT DETECTED
HPIV3 RNA NPH QL NAA+NON-PROBE: NOT DETECTED
HPIV4 RNA NPH QL NAA+NON-PROBE: NOT DETECTED
HUMAN METAPNEUMOVIRUS: NOT DETECTED
HYALINE CASTS #/AREA URNS LPF: 1 /LPF
IMM GRANULOCYTES # BLD AUTO: 0.26 K/UL (ref 0–0.04)
IMM GRANULOCYTES NFR BLD AUTO: 2.6 % (ref 0–0.5)
INFLUENZA A (SUBTYPES H1,H1-2009,H3): NOT DETECTED
INR PPP: 1.1 (ref 0.8–1.2)
KETONES UR QL STRIP: NEGATIVE
LACTATE SERPL-SCNC: 1.3 MMOL/L (ref 0.5–2.2)
LDH SERPL L TO P-CCNC: 193 U/L (ref 110–260)
LEUKOCYTE ESTERASE UR QL STRIP: ABNORMAL
LYMPHOCYTES # BLD AUTO: 2.3 K/UL (ref 1–4.8)
LYMPHOCYTES NFR BLD: 22.8 % (ref 18–48)
MAGNESIUM SERPL-MCNC: <0.7 MG/DL (ref 1.6–2.6)
MCH RBC QN AUTO: 31.4 PG (ref 27–31)
MCHC RBC AUTO-ENTMCNC: 32.2 G/DL (ref 32–36)
MCV RBC AUTO: 98 FL (ref 82–98)
MICROSCOPIC COMMENT: ABNORMAL
MONOCYTES # BLD AUTO: 0.9 K/UL (ref 0.3–1)
MONOCYTES NFR BLD: 9.1 % (ref 4–15)
MYCOPLASMA PNEUMONIAE: NOT DETECTED
NEUTROPHILS # BLD AUTO: 6.5 K/UL (ref 1.8–7.7)
NEUTROPHILS NFR BLD: 64.9 % (ref 38–73)
NITRITE UR QL STRIP: NEGATIVE
NRBC BLD-RTO: 0 /100 WBC
PH UR STRIP: 6 [PH] (ref 5–8)
PLATELET # BLD AUTO: 257 K/UL (ref 150–450)
PMV BLD AUTO: 9.5 FL (ref 9.2–12.9)
POTASSIUM SERPL-SCNC: 3.1 MMOL/L (ref 3.5–5.1)
PROT SERPL-MCNC: 6.8 G/DL (ref 6–8.4)
PROT UR QL STRIP: ABNORMAL
PROTHROMBIN TIME: 12.3 SEC (ref 9–12.5)
RBC # BLD AUTO: 3.79 M/UL (ref 4–5.4)
RBC #/AREA URNS HPF: 0 /HPF (ref 0–4)
RESPIRATORY INFECTION PANEL SOURCE: ABNORMAL
RSV RNA NPH QL NAA+NON-PROBE: NOT DETECTED
RV+EV RNA NPH QL NAA+NON-PROBE: NOT DETECTED
SARS-COV-2 RNA RESP QL NAA+PROBE: DETECTED
SODIUM SERPL-SCNC: 140 MMOL/L (ref 136–145)
SP GR UR STRIP: 1.02 (ref 1–1.03)
TROPONIN I SERPL DL<=0.01 NG/ML-MCNC: <0.006 NG/ML (ref 0–0.03)
URN SPEC COLLECT METH UR: ABNORMAL
UROBILINOGEN UR STRIP-ACNC: NEGATIVE EU/DL
WBC # BLD AUTO: 10.06 K/UL (ref 3.9–12.7)
WBC #/AREA URNS HPF: 6 /HPF (ref 0–5)

## 2024-12-31 PROCEDURE — 99213 OFFICE O/P EST LOW 20 MIN: CPT | Mod: S$GLB,,, | Performed by: INTERNAL MEDICINE

## 2024-12-31 PROCEDURE — 25000003 PHARM REV CODE 250: Mod: HCNC | Performed by: NURSE PRACTITIONER

## 2024-12-31 PROCEDURE — 94640 AIRWAY INHALATION TREATMENT: CPT | Mod: HCNC

## 2024-12-31 PROCEDURE — 87486 CHLMYD PNEUM DNA AMP PROBE: CPT | Mod: HCNC | Performed by: EMERGENCY MEDICINE

## 2024-12-31 PROCEDURE — 86803 HEPATITIS C AB TEST: CPT | Mod: HCNC | Performed by: EMERGENCY MEDICINE

## 2024-12-31 PROCEDURE — 25000242 PHARM REV CODE 250 ALT 637 W/ HCPCS: Mod: HCNC | Performed by: NURSE PRACTITIONER

## 2024-12-31 PROCEDURE — 83735 ASSAY OF MAGNESIUM: CPT | Mod: HCNC | Performed by: EMERGENCY MEDICINE

## 2024-12-31 PROCEDURE — 84484 ASSAY OF TROPONIN QUANT: CPT | Mod: HCNC | Performed by: EMERGENCY MEDICINE

## 2024-12-31 PROCEDURE — 25000003 PHARM REV CODE 250: Mod: HCNC | Performed by: FAMILY MEDICINE

## 2024-12-31 PROCEDURE — 83880 ASSAY OF NATRIURETIC PEPTIDE: CPT | Mod: HCNC | Performed by: EMERGENCY MEDICINE

## 2024-12-31 PROCEDURE — 83605 ASSAY OF LACTIC ACID: CPT | Mod: HCNC | Performed by: NURSE PRACTITIONER

## 2024-12-31 PROCEDURE — 3078F DIAST BP <80 MM HG: CPT | Mod: CPTII,S$GLB,, | Performed by: INTERNAL MEDICINE

## 2024-12-31 PROCEDURE — 85651 RBC SED RATE NONAUTOMATED: CPT | Mod: HCNC | Performed by: NURSE PRACTITIONER

## 2024-12-31 PROCEDURE — 94761 N-INVAS EAR/PLS OXIMETRY MLT: CPT | Mod: HCNC

## 2024-12-31 PROCEDURE — XW033E5 INTRODUCTION OF REMDESIVIR ANTI-INFECTIVE INTO PERIPHERAL VEIN, PERCUTANEOUS APPROACH, NEW TECHNOLOGY GROUP 5: ICD-10-PCS | Performed by: STUDENT IN AN ORGANIZED HEALTH CARE EDUCATION/TRAINING PROGRAM

## 2024-12-31 PROCEDURE — 81000 URINALYSIS NONAUTO W/SCOPE: CPT | Mod: HCNC | Performed by: NURSE PRACTITIONER

## 2024-12-31 PROCEDURE — 85730 THROMBOPLASTIN TIME PARTIAL: CPT | Mod: HCNC | Performed by: NURSE PRACTITIONER

## 2024-12-31 PROCEDURE — 25000242 PHARM REV CODE 250 ALT 637 W/ HCPCS: Mod: HCNC | Performed by: EMERGENCY MEDICINE

## 2024-12-31 PROCEDURE — 83615 LACTATE (LD) (LDH) ENZYME: CPT | Mod: HCNC | Performed by: NURSE PRACTITIONER

## 2024-12-31 PROCEDURE — 3288F FALL RISK ASSESSMENT DOCD: CPT | Mod: CPTII,S$GLB,, | Performed by: INTERNAL MEDICINE

## 2024-12-31 PROCEDURE — 85025 COMPLETE CBC W/AUTO DIFF WBC: CPT | Mod: HCNC | Performed by: EMERGENCY MEDICINE

## 2024-12-31 PROCEDURE — 3072F LOW RISK FOR RETINOPATHY: CPT | Mod: CPTII,S$GLB,, | Performed by: INTERNAL MEDICINE

## 2024-12-31 PROCEDURE — 63600175 PHARM REV CODE 636 W HCPCS: Mod: JZ,TB,HCNC | Performed by: NURSE PRACTITIONER

## 2024-12-31 PROCEDURE — 99900035 HC TECH TIME PER 15 MIN (STAT): Mod: HCNC

## 2024-12-31 PROCEDURE — G0378 HOSPITAL OBSERVATION PER HR: HCPCS | Mod: HCNC

## 2024-12-31 PROCEDURE — 93010 ELECTROCARDIOGRAM REPORT: CPT | Mod: ,,, | Performed by: STUDENT IN AN ORGANIZED HEALTH CARE EDUCATION/TRAINING PROGRAM

## 2024-12-31 PROCEDURE — 1159F MED LIST DOCD IN RCRD: CPT | Mod: CPTII,S$GLB,, | Performed by: INTERNAL MEDICINE

## 2024-12-31 PROCEDURE — 1160F RVW MEDS BY RX/DR IN RCRD: CPT | Mod: CPTII,S$GLB,, | Performed by: INTERNAL MEDICINE

## 2024-12-31 PROCEDURE — 87389 HIV-1 AG W/HIV-1&-2 AB AG IA: CPT | Mod: HCNC | Performed by: EMERGENCY MEDICINE

## 2024-12-31 PROCEDURE — 27000221 HC OXYGEN, UP TO 24 HOURS: Mod: HCNC

## 2024-12-31 PROCEDURE — 80053 COMPREHEN METABOLIC PANEL: CPT | Mod: HCNC | Performed by: EMERGENCY MEDICINE

## 2024-12-31 PROCEDURE — 93005 ELECTROCARDIOGRAM TRACING: CPT | Mod: HCNC

## 2024-12-31 PROCEDURE — 94640 AIRWAY INHALATION TREATMENT: CPT | Mod: HCNC,XB

## 2024-12-31 PROCEDURE — 82728 ASSAY OF FERRITIN: CPT | Mod: HCNC | Performed by: NURSE PRACTITIONER

## 2024-12-31 PROCEDURE — 99285 EMERGENCY DEPT VISIT HI MDM: CPT | Mod: 25

## 2024-12-31 PROCEDURE — 63600175 PHARM REV CODE 636 W HCPCS: Mod: HCNC | Performed by: EMERGENCY MEDICINE

## 2024-12-31 PROCEDURE — 3074F SYST BP LT 130 MM HG: CPT | Mod: CPTII,S$GLB,, | Performed by: INTERNAL MEDICINE

## 2024-12-31 PROCEDURE — 99999 PR PBB SHADOW E&M-EST. PATIENT-LVL V: CPT | Mod: PBBFAC,HCNC,, | Performed by: INTERNAL MEDICINE

## 2024-12-31 PROCEDURE — 1125F AMNT PAIN NOTED PAIN PRSNT: CPT | Mod: CPTII,S$GLB,, | Performed by: INTERNAL MEDICINE

## 2024-12-31 PROCEDURE — 1101F PT FALLS ASSESS-DOCD LE1/YR: CPT | Mod: CPTII,S$GLB,, | Performed by: INTERNAL MEDICINE

## 2024-12-31 PROCEDURE — 85379 FIBRIN DEGRADATION QUANT: CPT | Mod: HCNC | Performed by: NURSE PRACTITIONER

## 2024-12-31 PROCEDURE — 82550 ASSAY OF CK (CPK): CPT | Mod: HCNC | Performed by: NURSE PRACTITIONER

## 2024-12-31 PROCEDURE — 25000003 PHARM REV CODE 250: Mod: HCNC | Performed by: EMERGENCY MEDICINE

## 2024-12-31 PROCEDURE — 96372 THER/PROPH/DIAG INJ SC/IM: CPT | Performed by: NURSE PRACTITIONER

## 2024-12-31 PROCEDURE — 85610 PROTHROMBIN TIME: CPT | Mod: HCNC | Performed by: NURSE PRACTITIONER

## 2024-12-31 RX ORDER — GUAIFENESIN AND DEXTROMETHORPHAN HYDROBROMIDE 10; 100 MG/5ML; MG/5ML
10 SYRUP ORAL EVERY 6 HOURS
Status: DISCONTINUED | OUTPATIENT
Start: 2024-12-31 | End: 2025-01-03

## 2024-12-31 RX ORDER — GLUCAGON 1 MG
1 KIT INJECTION
Status: DISCONTINUED | OUTPATIENT
Start: 2024-12-31 | End: 2025-01-28 | Stop reason: HOSPADM

## 2024-12-31 RX ORDER — MONTELUKAST SODIUM 10 MG/1
10 TABLET ORAL DAILY
Status: DISCONTINUED | OUTPATIENT
Start: 2025-01-01 | End: 2025-01-28 | Stop reason: HOSPADM

## 2024-12-31 RX ORDER — PREDNISONE 10 MG/1
40 TABLET ORAL DAILY
Status: ON HOLD | COMMUNITY
Start: 2024-12-27 | End: 2024-12-31

## 2024-12-31 RX ORDER — ISOSORBIDE MONONITRATE 60 MG/1
120 TABLET, EXTENDED RELEASE ORAL NIGHTLY
Status: DISCONTINUED | OUTPATIENT
Start: 2024-12-31 | End: 2025-01-07

## 2024-12-31 RX ORDER — DOXYCYCLINE 100 MG/1
100 CAPSULE ORAL EVERY 12 HOURS
Status: ON HOLD | COMMUNITY
Start: 2024-12-27 | End: 2025-01-06

## 2024-12-31 RX ORDER — LEVETIRACETAM 500 MG/1
1000 TABLET ORAL 2 TIMES DAILY
Status: DISCONTINUED | OUTPATIENT
Start: 2024-12-31 | End: 2024-12-31

## 2024-12-31 RX ORDER — METOPROLOL TARTRATE 50 MG/1
50 TABLET ORAL 2 TIMES DAILY
Status: DISCONTINUED | OUTPATIENT
Start: 2024-12-31 | End: 2025-01-17

## 2024-12-31 RX ORDER — ENOXAPARIN SODIUM 100 MG/ML
1 INJECTION SUBCUTANEOUS 2 TIMES DAILY
Status: DISCONTINUED | OUTPATIENT
Start: 2024-12-31 | End: 2025-01-11

## 2024-12-31 RX ORDER — IBUPROFEN 200 MG
16 TABLET ORAL
Status: DISCONTINUED | OUTPATIENT
Start: 2024-12-31 | End: 2025-01-28 | Stop reason: HOSPADM

## 2024-12-31 RX ORDER — GUAIFENESIN AND DEXTROMETHORPHAN HYDROBROMIDE 10; 100 MG/5ML; MG/5ML
10 SYRUP ORAL EVERY 4 HOURS PRN
Status: DISCONTINUED | OUTPATIENT
Start: 2024-12-31 | End: 2024-12-31

## 2024-12-31 RX ORDER — MAGNESIUM SULFATE HEPTAHYDRATE 40 MG/ML
2 INJECTION, SOLUTION INTRAVENOUS
Status: COMPLETED | OUTPATIENT
Start: 2024-12-31 | End: 2024-12-31

## 2024-12-31 RX ORDER — SPIRONOLACTONE 25 MG/1
50 TABLET ORAL DAILY
Status: DISCONTINUED | OUTPATIENT
Start: 2025-01-01 | End: 2024-12-31

## 2024-12-31 RX ORDER — PANTOPRAZOLE SODIUM 40 MG/1
40 TABLET, DELAYED RELEASE ORAL 2 TIMES DAILY
Status: DISCONTINUED | OUTPATIENT
Start: 2024-12-31 | End: 2025-01-28 | Stop reason: HOSPADM

## 2024-12-31 RX ORDER — ASCORBIC ACID 500 MG
500 TABLET ORAL 2 TIMES DAILY
Status: DISCONTINUED | OUTPATIENT
Start: 2024-12-31 | End: 2025-01-28 | Stop reason: HOSPADM

## 2024-12-31 RX ORDER — IPRATROPIUM BROMIDE AND ALBUTEROL SULFATE 2.5; .5 MG/3ML; MG/3ML
3 SOLUTION RESPIRATORY (INHALATION)
Status: COMPLETED | OUTPATIENT
Start: 2024-12-31 | End: 2024-12-31

## 2024-12-31 RX ORDER — ONDANSETRON HYDROCHLORIDE 2 MG/ML
4 INJECTION, SOLUTION INTRAVENOUS EVERY 8 HOURS PRN
Status: DISCONTINUED | OUTPATIENT
Start: 2024-12-31 | End: 2025-01-28 | Stop reason: HOSPADM

## 2024-12-31 RX ORDER — ALBUTEROL SULFATE 90 UG/1
2 INHALANT RESPIRATORY (INHALATION) 4 TIMES DAILY
Status: DISCONTINUED | OUTPATIENT
Start: 2024-12-31 | End: 2025-01-01

## 2024-12-31 RX ORDER — LANOLIN ALCOHOL/MO/W.PET/CERES
400 CREAM (GRAM) TOPICAL ONCE
Status: COMPLETED | OUTPATIENT
Start: 2024-12-31 | End: 2024-12-31

## 2024-12-31 RX ORDER — CLOPIDOGREL BISULFATE 75 MG/1
75 TABLET ORAL DAILY
Status: DISCONTINUED | OUTPATIENT
Start: 2025-01-01 | End: 2025-01-18

## 2024-12-31 RX ORDER — RANOLAZINE 500 MG/1
1000 TABLET, EXTENDED RELEASE ORAL 2 TIMES DAILY
Status: DISCONTINUED | OUTPATIENT
Start: 2024-12-31 | End: 2024-12-31 | Stop reason: ALTCHOICE

## 2024-12-31 RX ORDER — ATORVASTATIN CALCIUM 40 MG/1
40 TABLET, FILM COATED ORAL DAILY
Status: DISCONTINUED | OUTPATIENT
Start: 2025-01-01 | End: 2025-01-28 | Stop reason: HOSPADM

## 2024-12-31 RX ORDER — GABAPENTIN 100 MG/1
200 CAPSULE ORAL 3 TIMES DAILY
Status: DISCONTINUED | OUTPATIENT
Start: 2024-12-31 | End: 2025-01-19

## 2024-12-31 RX ORDER — IBUPROFEN 200 MG
24 TABLET ORAL
Status: DISCONTINUED | OUTPATIENT
Start: 2024-12-31 | End: 2025-01-28 | Stop reason: HOSPADM

## 2024-12-31 RX ORDER — BENZONATATE 100 MG/1
100 CAPSULE ORAL 3 TIMES DAILY PRN
Status: ON HOLD | COMMUNITY
Start: 2024-12-27 | End: 2025-01-01

## 2024-12-31 RX ORDER — METHYLPREDNISOLONE SOD SUCC 125 MG
125 VIAL (EA) INJECTION
Status: COMPLETED | OUTPATIENT
Start: 2024-12-31 | End: 2024-12-31

## 2024-12-31 RX ORDER — LEVETIRACETAM 500 MG/1
500 TABLET ORAL 2 TIMES DAILY
Status: DISCONTINUED | OUTPATIENT
Start: 2024-12-31 | End: 2025-01-16

## 2024-12-31 RX ORDER — ACETAMINOPHEN 325 MG/1
650 TABLET ORAL EVERY 6 HOURS PRN
Status: DISCONTINUED | OUTPATIENT
Start: 2024-12-31 | End: 2025-01-17

## 2024-12-31 RX ORDER — SACUBITRIL AND VALSARTAN 24; 26 MG/1; MG/1
1 TABLET, FILM COATED ORAL 2 TIMES DAILY
Status: DISCONTINUED | OUTPATIENT
Start: 2024-12-31 | End: 2025-01-17

## 2024-12-31 RX ORDER — ROFLUMILAST 500 UG/1
1 TABLET ORAL DAILY
Status: DISCONTINUED | OUTPATIENT
Start: 2025-01-01 | End: 2025-01-07

## 2024-12-31 RX ORDER — SODIUM CHLORIDE 0.9 % (FLUSH) 0.9 %
10 SYRINGE (ML) INJECTION
Status: DISCONTINUED | OUTPATIENT
Start: 2024-12-31 | End: 2025-01-28 | Stop reason: HOSPADM

## 2024-12-31 RX ORDER — POTASSIUM CHLORIDE 20 MEQ/1
40 TABLET, EXTENDED RELEASE ORAL ONCE
Status: COMPLETED | OUTPATIENT
Start: 2024-12-31 | End: 2024-12-31

## 2024-12-31 RX ORDER — ESCITALOPRAM OXALATE 10 MG/1
20 TABLET ORAL DAILY
Status: DISCONTINUED | OUTPATIENT
Start: 2025-01-01 | End: 2025-01-28 | Stop reason: HOSPADM

## 2024-12-31 RX ORDER — LEVETIRACETAM 500 MG/1
500 TABLET ORAL 2 TIMES DAILY
Status: DISCONTINUED | OUTPATIENT
Start: 2024-12-31 | End: 2024-12-31

## 2024-12-31 RX ORDER — FLUTICASONE FUROATE AND VILANTEROL 100; 25 UG/1; UG/1
1 POWDER RESPIRATORY (INHALATION) DAILY
Status: DISCONTINUED | OUTPATIENT
Start: 2025-01-01 | End: 2025-01-06

## 2024-12-31 RX ADMIN — PANTOPRAZOLE SODIUM 40 MG: 40 TABLET, DELAYED RELEASE ORAL at 09:12

## 2024-12-31 RX ADMIN — POTASSIUM CHLORIDE 40 MEQ: 1500 TABLET, EXTENDED RELEASE ORAL at 02:12

## 2024-12-31 RX ADMIN — ALBUTEROL SULFATE 2 PUFF: 90 AEROSOL, METERED RESPIRATORY (INHALATION) at 08:12

## 2024-12-31 RX ADMIN — REMDESIVIR 200 MG: 100 INJECTION, POWDER, LYOPHILIZED, FOR SOLUTION INTRAVENOUS at 05:12

## 2024-12-31 RX ADMIN — ISOSORBIDE MONONITRATE 120 MG: 60 TABLET, EXTENDED RELEASE ORAL at 09:12

## 2024-12-31 RX ADMIN — METHYLPREDNISOLONE SODIUM SUCCINATE 125 MG: 125 INJECTION, POWDER, FOR SOLUTION INTRAMUSCULAR; INTRAVENOUS at 01:12

## 2024-12-31 RX ADMIN — ACETAMINOPHEN 650 MG: 325 TABLET ORAL at 10:12

## 2024-12-31 RX ADMIN — GUAIFENESIN AND DEXTROMETHORPHAN 10 ML: 100; 10 SYRUP ORAL at 10:12

## 2024-12-31 RX ADMIN — IPRATROPIUM BROMIDE AND ALBUTEROL SULFATE 3 ML: 2.5; .5 SOLUTION RESPIRATORY (INHALATION) at 01:12

## 2024-12-31 RX ADMIN — SACUBITRIL AND VALSARTAN 1 TABLET: 24; 26 TABLET, FILM COATED ORAL at 10:12

## 2024-12-31 RX ADMIN — GUAIFENESIN AND DEXTROMETHORPHAN 10 ML: 100; 10 SYRUP ORAL at 05:12

## 2024-12-31 RX ADMIN — METHYLPREDNISOLONE SODIUM SUCCINATE 60 MG: 40 INJECTION, POWDER, FOR SOLUTION INTRAMUSCULAR; INTRAVENOUS at 09:12

## 2024-12-31 RX ADMIN — GABAPENTIN 200 MG: 100 CAPSULE ORAL at 10:12

## 2024-12-31 RX ADMIN — LEVETIRACETAM 500 MG: 500 TABLET, FILM COATED ORAL at 05:12

## 2024-12-31 RX ADMIN — ENOXAPARIN SODIUM 50 MG: 60 INJECTION SUBCUTANEOUS at 09:12

## 2024-12-31 RX ADMIN — METOPROLOL TARTRATE 50 MG: 50 TABLET, FILM COATED ORAL at 09:12

## 2024-12-31 RX ADMIN — OXYCODONE HYDROCHLORIDE AND ACETAMINOPHEN 500 MG: 500 TABLET ORAL at 09:12

## 2024-12-31 RX ADMIN — MAGNESIUM SULFATE HEPTAHYDRATE 2 G: 40 INJECTION, SOLUTION INTRAVENOUS at 04:12

## 2024-12-31 RX ADMIN — SODIUM CHLORIDE 1000 ML: 9 INJECTION, SOLUTION INTRAVENOUS at 02:12

## 2024-12-31 RX ADMIN — Medication 400 MG: at 04:12

## 2024-12-31 NOTE — FIRST PROVIDER EVALUATION
Emergency Department TeleTriage Encounter Note      CHIEF COMPLAINT    Chief Complaint   Patient presents with    Cough     Pt states she was sent here by Dr. Sam for admission due to cough and wheezing r/t bronchitis.  She was seen in the ED recently and prescribed medications with no improvement.       VITAL SIGNS   Initial Vitals [12/31/24 1146]   BP Pulse Resp Temp SpO2   (!) 108/57 89 (!) 24 98 °F (36.7 °C) 97 %      MAP       --            ALLERGIES    Review of patient's allergies indicates:   Allergen Reactions    Penicillins Anaphylaxis, Hives, Shortness Of Breath and Swelling    Penicillin     Adhesive Rash    Doxycycline Nausea Only    Oxycodone Other (See Comments)     Fatigue      Sulfa (sulfonamide antibiotics) Itching       PROVIDER TRIAGE NOTE  This is a teletriage evaluation of a 79 y.o. female presenting to the ED complaining of cough. Patient sent by pulmonology for possible admission for IV steroids.     Patient is alert and oriented. She cannot complete a sentence without coughing. She is sitting upright in the chair in no distress but appears ill.    Initial orders will be placed and care will be transferred to an alternate provider when patient is roomed for a full evaluation. Any additional orders and the final disposition will be determined by that provider.         ORDERS  Labs Reviewed   RESPIRATORY INFECTION PANEL (PCR), NASOPHARYNGEAL   HEPATITIS C ANTIBODY   HEP C VIRUS HOLD SPECIMEN   HIV 1 / 2 ANTIBODY   CBC W/ AUTO DIFFERENTIAL   COMPREHENSIVE METABOLIC PANEL   TROPONIN I   B-TYPE NATRIURETIC PEPTIDE       ED Orders (720h ago, onward)      Start Ordered     Status Ordering Provider    12/31/24 1245 12/31/24 1242  methylPREDNISolone sodium succinate injection 125 mg  ED 1 Time         Ordered NOAH SOMERS    12/31/24 1245 12/31/24 1242  albuterol-ipratropium 2.5 mg-0.5 mg/3 mL nebulizer solution 3 mL  Every 5 min         Ordered NOAH SOMERS    12/31/24 1242 12/31/24 1242  " Possible Hospitalization  Once        Comments: For further elaboration on reason for hospitalization.    Ordered LIZBET, NOAH    12/31/24 1241 12/31/24 1242  Cardiac Monitoring - Adult  Continuous        Comments: Notify Physician If:    Ordered LIZBET, NOAH    12/31/24 1241 12/31/24 1242  Pulse Oximetry Continuous  Continuous         Ordered LIZBET, NOAH    12/31/24 1241 12/31/24 1242  Saline lock IV  Once         Ordered LIZBET, NOAH    12/31/24 1241 12/31/24 1242  EKG 12-lead  Once        Comments: Do not perform if previously done during this visit/ triage    Ordered LIZBET, NOAH    12/31/24 1241 12/31/24 1242  CBC auto differential  STAT         Ordered LIZBET, NOAH    12/31/24 1241 12/31/24 1242  Comprehensive metabolic panel  STAT         Ordered LIZBET, NOAH    12/31/24 1241 12/31/24 1242  Troponin I #1  STAT         Ordered LIZBET NOAH    12/31/24 1241 12/31/24 1242  BNP  STAT         Ordered LIZBET, NOAH    12/31/24 1241 12/31/24 1242  Respiratory Infection Panel (PCR), Nasopharyngeal  STAT        Comments: Assay not valid for lower respiratory specimens, alternate testing required.      Ordered LIZBET, NOAH    12/31/24 1241 12/31/24 1242  CT Chest Without Contrast  1 time imaging         Ordered LIZBET, NOAH    12/31/24 1150 12/31/24 1149  Hepatitis C Antibody  STAT        Placed in "And" Linked Group    Ordered ZOEY MANCIA P.    12/31/24 1150 12/31/24 1149  HCV Virus Hold Specimen  STAT        Placed in "And" Linked Group    Ordered ZOEY MANCIA P.    12/31/24 1150 12/31/24 1149  HIV 1/2 Ag/Ab (4th Gen)  STAT         Ordered ZOEY MANCIA.              Virtual Visit Note: The provider triage portion of this emergency department evaluation and documentation was performed via [employer], a HIPAA-compliant telemedicine application, in concert with a tele-presenter in the room. A face to face patient evaluation with one of my colleagues will occur " once the patient is placed in an emergency department room.      DISCLAIMER: This note was prepared with [employer] voice recognition transcription software. Garbled syntax, mangled pronouns, and other bizarre constructions may be attributed to that software system.

## 2024-12-31 NOTE — ED PROVIDER NOTES
SCRIBE #1 NOTE: I, Catherine Pratt, am scribing for, and in the presence of, Lazara Dalal MD. I have scribed the entire note.       History     Chief Complaint   Patient presents with    Cough     Pt states she was sent here by Dr. Sam for admission due to cough and wheezing r/t bronchitis.  She was seen in the ED recently and prescribed medications with no improvement.     Review of patient's allergies indicates:   Allergen Reactions    Penicillins Anaphylaxis, Hives, Shortness Of Breath and Swelling    Penicillin     Adhesive Rash    Doxycycline Nausea Only    Oxycodone Other (See Comments)     Fatigue      Sulfa (sulfonamide antibiotics) Itching         History of Present Illness     HPI    12/31/2024, 1:36 PM  History obtained from the patient      History of Present Illness: Shireen Felix is a 79 y.o. female patient with a PMHx of DM, HTN, Stroke, seizures, COPD, aneurysm, arthritis, and CAD who presents to the Emergency Department for evaluation of cough which onset gradually ~11 days ago. She was seen at [facility] 12/27/24 for sxs of cough, fatigue, fever, SOB, and sore throat and given abx and steroids with little relief. Dr. Sam (Pulmonologist) saw her today PTA, who sent her here for IV steroids, bronchodilators, and opiates for sxs relief. Symptoms are constant and moderate in severity. No mitigating or exacerbating factors reported. Associated sxs include cough, congestion, wheezing, and SOB. Patient denies any fever, n/v, CP, and all other sxs at this time. No further complaints or concerns at this time.       Arrival mode: Personal vehicle    PCP: Laron Chaudhari MD        Past Medical History:  Past Medical History:   Diagnosis Date    Abnormal ECG 08/05/2019    Acute bronchitis and bronchiolitis 12/31/2024    Aneurysm     Anticoagulant long-term use     Arthritis     CAD in native artery 08/05/2019    Diabetes mellitus     Fall 10/10/2019    Formatting of this note might be  different from the original. Found sitting on floor next to bed last night Mild confusion today Does not recall falling or how she ended up on floor UA today Labs yesterday unremarkable    Glaucoma     Hypertension     Pulmonary fibrosis     Seizures     Shingles 05/27/2017    Stroke        Past Surgical History:  Past Surgical History:   Procedure Laterality Date    BRAIN SURGERY      CARDIAC CATHETERIZATION      CATHETERIZATION OF BOTH LEFT AND RIGHT HEART N/A 5/17/2024    Procedure: CATHETERIZATION, HEART, BOTH LEFT AND RIGHT;  Surgeon: Rachelle Sarabia MD;  Location: [facility] CATH LAB;  Service: Cardiology;  Laterality: N/A;    COLONOSCOPY N/A 09/21/2023    Procedure: COLONOSCOPY;  Surgeon: Joanna Leal MD;  Location: [facility] ENDO;  Service: Endoscopy;  Laterality: N/A;    CORONARY ANGIOPLASTY      EPIDURAL STEROID INJECTION INTO CERVICAL SPINE N/A 2/7/2024    Procedure: Cervical IL XANDER, Level C6/7, RN IV sedation;  Surgeon: Francisco Oshea MD;  Location: [facility] PAIN MGT;  Service: Pain Management;  Laterality: N/A;    EPIDURAL STEROID INJECTION INTO LUMBAR SPINE Bilateral 11/12/2019    Procedure: TF XANDER L4/5;  Surgeon: Desean Dias MD;  Location: [facility] PAIN MGT;  Service: Pain Management;  Laterality: Bilateral;    HYSTERECTOMY      INJECTION OF ANESTHETIC AGENT AROUND MEDIAL BRANCH NERVES INNERVATING LUMBAR FACET JOINT Bilateral 01/31/2020    Procedure: Bilateral L3-5 MBB;  Surgeon: Desean Dias MD;  Location: [facility] PAIN MGT;  Service: Pain Management;  Laterality: Bilateral;    INJECTION OF ANESTHETIC AGENT INTO SACROILIAC JOINT Right 11/16/2021    Procedure: Right BLOCK, SACROILIAC JOINT Right GTB with RN IV sedation;  Surgeon: Yao Fulton MD;  Location: [facility] PAIN MGT;  Service: Pain Management;  Laterality: Right;    INJECTION OF ANESTHETIC AGENT INTO SACROILIAC JOINT Bilateral 07/28/2023    Procedure: Bilateral GT bursa + bilateral SIJ injection;  Surgeon: Francisco Oshea MD;  Location: [facility] PAIN  MGT;  Service: Pain Management;  Laterality: Bilateral;    INJECTION OF JOINT Bilateral 07/09/2020    Procedure: Bilateral shoulder GH Joint injection with local;  Surgeon: Desean Dias MD;  Location: [facility] PAIN MGT;  Service: Pain Management;  Laterality: Bilateral;    INJECTION OF JOINT Bilateral 07/28/2023    Procedure: Bilateral GT bursa + bilateral SIJ injection NEEDS ADDITIONAL SEDATION AND MORE TIME BEFORE INJECTION RN IV Sedation;  Surgeon: Francisco Oshea MD;  Location: [facility] PAIN MGT;  Service: Pain Management;  Laterality: Bilateral;    INJECTION OF JOINT N/A 10/25/2023    Procedure: Sacrococcygeal joint injection;  Surgeon: Francisco Oshea MD;  Location: [facility] PAIN MGT;  Service: Pain Management;  Laterality: N/A;    OOPHORECTOMY      SELECTIVE INJECTION OF ANESTHETIC AGENT AROUND LUMBAR SPINAL NERVE ROOT BY TRANSFORAMINAL APPROACH Bilateral 04/26/2023    Procedure: Bilateral L4/5 TF XANDER RN IV Sedation;  Surgeon: Francisco Oshea MD;  Location: [facility] PAIN MGT;  Service: Pain Management;  Laterality: Bilateral;    TRANSFORAMINAL EPIDURAL INJECTION OF STEROID Bilateral 07/23/2019    Procedure: Bilateral L3/4 Transforaminal Epidural Steroid Injection;  Surgeon: Desean Dias MD;  Location: [facility] PAIN MGT;  Service: Pain Management;  Laterality: Bilateral;    TRANSFORAMINAL EPIDURAL INJECTION OF STEROID Bilateral 03/10/2020    Procedure: Right T12/L1 TF XANDER with local;  Surgeon: Desean Dias MD;  Location: [facility] PAIN MGT;  Service: Pain Management;  Laterality: Bilateral;    TRANSFORAMINAL EPIDURAL INJECTION OF STEROID Right 06/19/2020    Procedure: Right T12/L1 TFESI Covid day of procedure;  Surgeon: Desean Dias MD;  Location: [facility] PAIN MGT;  Service: Pain Management;  Laterality: Right;         Family History:  Family History   Problem Relation Name Age of Onset    No Known Problems Mother      No Known Problems Father      Breast cancer Maternal Aunt      Breast cancer Maternal Aunt      Breast  cancer Maternal Aunt         Social History:  Social History     Tobacco Use    Smoking status: Former     Current packs/day: 0.00     Average packs/day: 1 pack/day for 42.3 years (42.3 ttl pk-yrs)     Types: Cigarettes     Start date:      Quit date: 2004     Years since quittin.7    Smokeless tobacco: Former   Substance and Sexual Activity    Alcohol use: No    Drug use: Never    Sexual activity: Not Currently     Partners: Male        Review of Systems     Review of Systems   Constitutional:  Negative for fever.   HENT:  Positive for congestion. Negative for sore throat.    Respiratory:  Positive for cough, shortness of breath and wheezing.    Cardiovascular:  Negative for chest pain.   Gastrointestinal:  Negative for nausea and vomiting.   Genitourinary:  Negative for dysuria.   Musculoskeletal:  Negative for back pain.   Skin:  Negative for rash.   Neurological:  Negative for weakness.   Hematological:  Does not bruise/bleed easily.   All other systems reviewed and are negative.     Physical Exam     Initial Vitals [24 1146]   BP Pulse Resp Temp SpO2   (!) 108/57 89 (!) 24 98 °F (36.7 °C) 97 %      MAP       --          Physical Exam  Nursing Notes and Vital Signs Reviewed.  Constitutional: Patient is in moderate distress. Well-developed and well-nourished.  Head: Atraumatic. Normocephalic.  Eyes: PERRL. EOM intact. Conjunctivae are not pale. No scleral icterus.  ENT: Mucous membranes are moist. Oropharynx is clear and symmetric.    Neck: Supple. Full ROM. No lymphadenopathy.  Cardiovascular: Regular rate. Regular rhythm. No murmurs, rubs, or gallops. Distal pulses are 2+ and symmetric.  Pulmonary/Chest: Tachypneic. Dry cough. Expiratory wheezing with bilateral rhonchi.   Abdominal: Soft and non-distended.  There is no tenderness.  No rebound, guarding, or rigidity. Good bowel sounds.  Genitourinary: No CVA tenderness  Musculoskeletal: Moves all extremities. No obvious deformities. No edema.  No calf tenderness.  Skin: Warm and dry.  Neurological:  Alert, awake, and appropriate.  Normal speech.  No acute focal neurological deficits are appreciated.  Psychiatric: Normal affect. Good eye contact. Appropriate in content.     ED Course   Critical Care    Date/Time: 12/31/2024 2:15 PM    Performed by: Lazara Dalal MD  Authorized by: Lazara Dalal MD  Direct patient critical care time: 40 minutes  Additional history critical care time: 6 minutes  Ordering / reviewing critical care time: 6 minutes  Documentation critical care time: 6 minutes  Consulting other physicians critical care time: 5 minutes  Total critical care time (exclusive of procedural time) : 63 minutes  Critical care was necessary to treat or prevent imminent or life-threatening deterioration of the following conditions: respiratory failure, cardiac failure and sepsis.  Critical care was time spent personally by me on the following activities: blood draw for specimens, development of treatment plan with patient or surrogate, discussions with consultants, interpretation of cardiac output measurements, evaluation of patient's response to treatment, examination of patient, obtaining history from patient or surrogate, ordering and performing treatments and interventions, ordering and review of laboratory studies, ordering and review of radiographic studies, re-evaluation of patient's condition, review of old charts and pulse oximetry.        ED Vital Signs:  Vitals:    01/15/25 2111 01/15/25 2226 01/16/25 0120 01/16/25 0240   BP: (!) 91/46 (!) 114/56 (!) 108/53    Pulse: 93 94 93 94   Resp: 15 18 18    Temp: 99.3 °F (37.4 °C)  (!) 101.4 °F (38.6 °C)    TempSrc: Oral  Oral    SpO2: 100% (!) 94% 96%    Weight:   53.5 kg (117 lb 15.1 oz)    Height:        01/16/25 0343 01/16/25 0736 01/16/25 0820 01/16/25 0825   BP: (!) 111/56   (!) 116/56   Pulse: 92 85 91 91   Resp: 16   20   Temp: (!) 100.6 °F (38.1 °C)   98.8 °F (37.1 °C)   TempSrc: Oral    Oral   SpO2: (!) 94%      Weight:       Height:        01/16/25 0909 01/16/25 0915 01/16/25 1151 01/16/25 1604   BP:  (!) 105/52 (!) 110/55    Pulse:   82 83   Resp: 15  20    Temp:   98.3 °F (36.8 °C)    TempSrc:   Oral    SpO2:   98%    Weight:       Height:        01/16/25 1631 01/16/25 2023 01/16/25 2025   BP: (!) 118/55 (!) 118/55 (!) 118/55   Pulse: 82     Resp: 20     Temp: 98.2 °F (36.8 °C)     TempSrc: Oral     SpO2: 100%     Weight:      Height:          Abnormal Lab Results:  Labs Reviewed   RESPIRATORY INFECTION PANEL (PCR), NASOPHARYNGEAL - Abnormal       Result Value    Respiratory Infection Panel Source NP Swab      Adenovirus Not Detected      Coronavirus 229E, Common Cold Virus Not Detected      Coronavirus HKU1, Common Cold Virus Not Detected      Coronavirus NL63, Common Cold Virus Not Detected      Coronavirus OC43, Common Cold Virus Not Detected      SARS-CoV2 (COVID-19) Qualitative PCR Detected (*)     Human Metapneumovirus Not Detected      Human Rhinovirus/Enterovirus Not Detected      Influenza A (subtypes H1, H1-2009,H3) Not Detected      Influenza B Not Detected      Parainfluenza Virus 1 Not Detected      Parainfluenza Virus 2 Not Detected      Parainfluenza Virus 3 Not Detected      Parainfluenza Virus 4 Not Detected      Respiratory Syncytial Virus Not Detected      Bordetella Parapertussis ([id number]) Not Detected      Bordetella pertussis (ptxP) Not Detected      Chlamydia pneumoniae Not Detected      Mycoplasma pneumoniae Not Detected      Narrative:     Assay not valid for lower respiratory specimens, alternate  testing required.   CBC W/ AUTO DIFFERENTIAL - Abnormal    WBC 10.06      RBC 3.79 (*)     Hemoglobin 11.9 (*)     Hematocrit 37.0      MCV 98      MCH 31.4 (*)     MCHC 32.2      RDW 14.3      Platelets 257      MPV 9.5      Immature Granulocytes 2.6 (*)     Gran # (ANC) 6.5      Immature Grans (Abs) 0.26 (*)     Lymph # 2.3      Mono # 0.9      Eos # 0.0      Baso # 0.03       nRBC 0      Gran % 64.9      Lymph % 22.8      Mono % 9.1      Eosinophil % 0.3      Basophil % 0.3      Differential Method Automated     COMPREHENSIVE METABOLIC PANEL - Abnormal    Sodium 140      Potassium 3.1 (*)     Chloride 110      CO2 16 (*)     Glucose 117 (*)     BUN 34 (*)     Creatinine 1.5 (*)     Calcium 8.3 (*)     Total Protein 6.8      Albumin 2.9 (*)     Total Bilirubin 0.3      Alkaline Phosphatase 45      AST 12      ALT 10      eGFR 35 (*)     Anion Gap 14     URINALYSIS, REFLEX TO URINE CULTURE - Abnormal    Specimen UA Urine, Clean Catch      Color, UA Yellow      Appearance, UA Clear      pH, UA 6.0      Specific Gravity, UA 1.020      Protein, UA 1+ (*)     Glucose, UA 1+ (*)     Ketones, UA Negative      Bilirubin (UA) Negative      Occult Blood UA Negative      Nitrite, UA Negative      Urobilinogen, UA Negative      Leukocytes, UA 2+ (*)     Narrative:     Specimen Source->Urine   MAGNESIUM - Abnormal    Magnesium <0.7 (*)     Narrative:     mg critical result(s) called and verbal readback obtained from bandar vergara rn by [email or web address] 12/31/2024 15:10   SEDIMENTATION RATE - Abnormal    Sed Rate 60 (*)    D DIMER, QUANTITATIVE - Abnormal    D-Dimer 0.77 (*)    URINALYSIS MICROSCOPIC - Abnormal    RBC, UA 0      WBC, UA 6 (*)     Bacteria Rare      Hyaline Casts, UA 1      Microscopic Comment SEE COMMENT      Narrative:     Specimen Source->Urine   HEPATITIS C ANTIBODY    Hepatitis C Ab Negative      Narrative:     Release to patient->Immediate   HEP C VIRUS HOLD SPECIMEN    HEP C Virus Hold Specimen Hold for HCV sendout      Narrative:     Release to patient->Immediate   HIV 1 / 2 ANTIBODY    HIV 1/2 Ag/Ab Negative      Narrative:     Release to patient->Immediate   TROPONIN I    Troponin I <0.006     B-TYPE NATRIURETIC PEPTIDE    BNP 92     MAGNESIUM   PROTIME-INR    Prothrombin Time 12.3      INR 1.1     APTT    aPTT 29.2     CK    CPK 81     LACTATE DEHYDROGENASE         FERRITIN     Ferritin 146     LACTIC ACID, PLASMA    Lactate (Lactic Acid) 1.3          All Lab Results:  Results for orders placed or performed during the hospital encounter of 12/31/24   EKG 12-lead    Collection Time: 12/31/24 12:49 PM   Result Value Ref Range    QRS Duration 132 ms    [facility] QTC Calculation 486 ms   Respiratory Infection Panel (PCR), Nasopharyngeal    Collection Time: 12/31/24 12:57 PM    Specimen: Nasopharyngeal Swab   Result Value Ref Range    Respiratory Infection Panel Source NP Swab Not Detected    Adenovirus Not Detected Not Detected    Coronavirus 229E, Common Cold Virus Not Detected Not Detected    Coronavirus HKU1, Common Cold Virus Not Detected Not Detected    Coronavirus NL63, Common Cold Virus Not Detected Not Detected    Coronavirus OC43, Common Cold Virus Not Detected Not Detected    SARS-CoV2 (COVID-19) Qualitative PCR Detected (A) Not Detected    Human Metapneumovirus Not Detected Not Detected    Human Rhinovirus/Enterovirus Not Detected Not Detected    Influenza A (subtypes H1, H1-2009,H3) Not Detected Not Detected    Influenza B Not Detected Not Detected    Parainfluenza Virus 1 Not Detected Not Detected    Parainfluenza Virus 2 Not Detected Not Detected    Parainfluenza Virus 3 Not Detected Not Detected    Parainfluenza Virus 4 Not Detected Not Detected    Respiratory Syncytial Virus Not Detected Not Detected    Bordetella Parapertussis ([id number]) Not Detected Not Detected    Bordetella pertussis (ptxP) Not Detected Not Detected    Chlamydia pneumoniae Not Detected Not Detected    Mycoplasma pneumoniae Not Detected Not Detected   Hepatitis C Antibody    Collection Time: 12/31/24  1:28 PM   Result Value Ref Range    Hepatitis C Ab Negative Negative   HCV Virus Hold Specimen    Collection Time: 12/31/24  1:28 PM   Result Value Ref Range    HEP C Virus Hold Specimen Hold for HCV sendout    HIV 1/2 Ag/Ab (4th Gen)    Collection Time: 12/31/24  1:28 PM   Result Value Ref Range    HIV 1/2 Ag/Ab  Negative Negative   CBC auto differential    Collection Time: 12/31/24  1:28 PM   Result Value Ref Range    WBC 10.06 3.90 - 12.70 K/uL    RBC 3.79 (L) 4.00 - 5.40 M/uL    Hemoglobin 11.9 (L) 12.0 - 16.0 g/dL    Hematocrit 37.0 37.0 - 48.5 %    MCV 98 82 - 98 fL    MCH 31.4 (H) 27.0 - 31.0 pg    MCHC 32.2 32.0 - 36.0 g/dL    RDW 14.3 11.5 - 14.5 %    Platelets 257 150 - 450 K/uL    MPV 9.5 9.2 - 12.9 fL    Immature Granulocytes 2.6 (H) 0.0 - 0.5 %    Gran # (ANC) 6.5 1.8 - 7.7 K/uL    Immature Grans (Abs) 0.26 (H) 0.00 - 0.04 K/uL    Lymph # 2.3 1.0 - 4.8 K/uL    Mono # 0.9 0.3 - 1.0 K/uL    Eos # 0.0 0.0 - 0.5 K/uL    Baso # 0.03 0.00 - 0.20 K/uL    nRBC 0 0 /100 WBC    Gran % 64.9 38.0 - 73.0 %    Lymph % 22.8 18.0 - 48.0 %    Mono % 9.1 4.0 - 15.0 %    Eosinophil % 0.3 0.0 - 8.0 %    Basophil % 0.3 0.0 - 1.9 %    Differential Method Automated    Comprehensive metabolic panel    Collection Time: 12/31/24  1:28 PM   Result Value Ref Range    Sodium 140 136 - 145 mmol/L    Potassium 3.1 (L) 3.5 - 5.1 mmol/L    Chloride 110 95 - 110 mmol/L    CO2 16 (L) 23 - 29 mmol/L    Glucose 117 (H) 70 - 110 mg/dL    BUN 34 (H) 8 - 23 mg/dL    Creatinine 1.5 (H) 0.5 - 1.4 mg/dL    Calcium 8.3 (L) 8.7 - 10.5 mg/dL    Total Protein 6.8 6.0 - 8.4 g/dL    Albumin 2.9 (L) 3.5 - 5.2 g/dL    Total Bilirubin 0.3 0.1 - 1.0 mg/dL    Alkaline Phosphatase 45 40 - 150 U/L    AST 12 10 - 40 U/L    ALT 10 10 - 44 U/L    eGFR 35 (A) >60 mL/min/1.73 m^2    Anion Gap 14 8 - 16 mmol/L   Troponin I #1    Collection Time: 12/31/24  1:28 PM   Result Value Ref Range    Troponin I <0.006 0.000 - 0.026 ng/mL   BNP    Collection Time: 12/31/24  1:28 PM   Result Value Ref Range    BNP 92 0 - 99 pg/mL   Magnesium    Collection Time: 12/31/24  1:28 PM   Result Value Ref Range    Magnesium <0.7 (LL) 1.6 - 2.6 mg/dL   Urinalysis, Reflex to Urine Culture Urine, Clean Catch    Collection Time: 12/31/24  3:20 PM    Specimen: Urine   Result Value Ref Range     Specimen UA Urine, Clean Catch     Color, UA Yellow Yellow, Straw, Jeanie    Appearance, UA Clear Clear    pH, UA 6.0 5.0 - 8.0    Specific Gravity, UA 1.020 1.005 - 1.030    Protein, UA 1+ (A) Negative    Glucose, UA 1+ (A) Negative    Ketones, UA Negative Negative    Bilirubin (UA) Negative Negative    Occult Blood UA Negative Negative    Nitrite, UA Negative Negative    Urobilinogen, UA Negative <2.0 EU/dL    Leukocytes, UA 2+ (A) Negative   Urinalysis Microscopic    Collection Time: 12/31/24  3:20 PM   Result Value Ref Range    RBC, UA 0 0 - 4 /hpf    WBC, UA 6 (H) 0 - 5 /hpf    Bacteria Rare None-Occ /hpf    Hyaline Casts, UA 1 0-1/lpf /lpf    Microscopic Comment SEE COMMENT    PT/INR    Collection Time: 12/31/24  4:43 PM   Result Value Ref Range    Prothrombin Time 12.3 9.0 - 12.5 sec    INR 1.1 0.8 - 1.2   PTT    Collection Time: 12/31/24  4:43 PM   Result Value Ref Range    aPTT 29.2 21.0 - 32.0 sec   Sedimentation rate    Collection Time: 12/31/24  4:43 PM   Result Value Ref Range    Sed Rate 60 (H) 0 - 36 mm/Hr   CK    Collection Time: 12/31/24  4:43 PM   Result Value Ref Range    CPK 81 20 - 180 U/L   Lactate Dehydrogenase    Collection Time: 12/31/24  4:43 PM   Result Value Ref Range     110 - 260 U/L   Ferritin    Collection Time: 12/31/24  4:43 PM   Result Value Ref Range    Ferritin 146 20.0 - 300.0 ng/mL   D-Dimer, Quantitative    Collection Time: 12/31/24  4:43 PM   Result Value Ref Range    D-Dimer 0.77 (H) <0.50 mg/L FEU   Lactic Acid, Plasma    Collection Time: 12/31/24  4:43 PM   Result Value Ref Range    Lactate (Lactic Acid) 1.3 0.5 - 2.2 mmol/L   CBC with Automated Differential    Collection Time: 01/01/25  3:31 AM   Result Value Ref Range    WBC 6.18 3.90 - 12.70 K/uL    RBC 3.63 (L) 4.00 - 5.40 M/uL    Hemoglobin 11.1 (L) 12.0 - 16.0 g/dL    Hematocrit 35.3 (L) 37.0 - 48.5 %    MCV 97 82 - 98 fL    MCH 30.6 27.0 - 31.0 pg    MCHC 31.4 (L) 32.0 - 36.0 g/dL    RDW 14.3 11.5 - 14.5 %     Platelets 238 150 - 450 K/uL    MPV 9.4 9.2 - 12.9 fL    Immature Granulocytes 2.6 (H) 0.0 - 0.5 %    Gran # (ANC) 5.4 1.8 - 7.7 K/uL    Immature Grans (Abs) 0.16 (H) 0.00 - 0.04 K/uL    Lymph # 0.5 (L) 1.0 - 4.8 K/uL    Mono # 0.1 (L) 0.3 - 1.0 K/uL    Eos # 0.0 0.0 - 0.5 K/uL    Baso # 0.01 0.00 - 0.20 K/uL    nRBC 0 0 /100 WBC    Gran % 86.8 (H) 38.0 - 73.0 %    Lymph % 8.3 (L) 18.0 - 48.0 %    Mono % 2.1 (L) 4.0 - 15.0 %    Eosinophil % 0.0 0.0 - 8.0 %    Basophil % 0.2 0.0 - 1.9 %    Differential Method Automated    Basic metabolic panel    Collection Time: 01/01/25  5:05 AM   Result Value Ref Range    Sodium 140 136 - 145 mmol/L    Potassium 4.0 3.5 - 5.1 mmol/L    Chloride 115 (H) 95 - 110 mmol/L    CO2 16 (L) 23 - 29 mmol/L    Glucose 153 (H) 70 - 110 mg/dL    BUN 29 (H) 8 - 23 mg/dL    Creatinine 1.1 0.5 - 1.4 mg/dL    Calcium 7.7 (L) 8.7 - 10.5 mg/dL    Anion Gap 9 8 - 16 mmol/L    eGFR 51 (A) >60 mL/min/1.73 m^2   Magnesium    Collection Time: 01/01/25  5:05 AM   Result Value Ref Range    Magnesium 1.3 (L) 1.6 - 2.6 mg/dL   Phosphorus    Collection Time: 01/02/25  5:42 AM   Result Value Ref Range    Phosphorus 3.2 2.7 - 4.5 mg/dL   Magnesium    Collection Time: 01/02/25  5:42 AM   Result Value Ref Range    Magnesium 2.1 1.6 - 2.6 mg/dL   Comprehensive Metabolic Panel    Collection Time: 01/02/25  5:42 AM   Result Value Ref Range    Sodium 134 (L) 136 - 145 mmol/L    Potassium 4.3 3.5 - 5.1 mmol/L    Chloride 111 (H) 95 - 110 mmol/L    CO2 16 (L) 23 - 29 mmol/L    Glucose 142 (H) 70 - 110 mg/dL    BUN 29 (H) 8 - 23 mg/dL    Creatinine 0.9 0.5 - 1.4 mg/dL    Calcium 8.2 (L) 8.7 - 10.5 mg/dL    Total Protein 5.4 (L) 6.0 - 8.4 g/dL    Albumin 2.3 (L) 3.5 - 5.2 g/dL    Total Bilirubin 0.2 0.1 - 1.0 mg/dL    Alkaline Phosphatase 45 40 - 150 U/L    AST 11 10 - 40 U/L    ALT 10 10 - 44 U/L    eGFR >60 >60 mL/min/1.73 m^2    Anion Gap 7 (L) 8 - 16 mmol/L   CBC Auto Differential    Collection Time: 01/02/25  5:42  AM   Result Value Ref Range    WBC 12.56 3.90 - 12.70 K/uL    RBC 3.41 (L) 4.00 - 5.40 M/uL    Hemoglobin 10.2 (L) 12.0 - 16.0 g/dL    Hematocrit 33.7 (L) 37.0 - 48.5 %    MCV 99 (H) 82 - 98 fL    MCH 29.9 27.0 - 31.0 pg    MCHC 30.3 (L) 32.0 - 36.0 g/dL    RDW 14.3 11.5 - 14.5 %    Platelets 239 150 - 450 K/uL    MPV 9.2 9.2 - 12.9 fL    Immature Granulocytes 1.3 (H) 0.0 - 0.5 %    Gran # (ANC) 10.8 (H) 1.8 - 7.7 K/uL    Immature Grans (Abs) 0.16 (H) 0.00 - 0.04 K/uL    Lymph # 0.9 (L) 1.0 - 4.8 K/uL    Mono # 0.8 0.3 - 1.0 K/uL    Eos # 0.0 0.0 - 0.5 K/uL    Baso # 0.01 0.00 - 0.20 K/uL    nRBC 0 0 /100 WBC    Gran % 85.7 (H) 38.0 - 73.0 %    Lymph % 6.8 (L) 18.0 - 48.0 %    Mono % 6.1 4.0 - 15.0 %    Eosinophil % 0.0 0.0 - 8.0 %    Basophil % 0.1 0.0 - 1.9 %    Differential Method Automated    Lactate Dehydrogenase    Collection Time: 01/02/25  3:28 PM   Result Value Ref Range     110 - 260 U/L   Ferritin    Collection Time: 01/02/25  3:28 PM   Result Value Ref Range    Ferritin 128 20.0 - 300.0 ng/mL   D-Dimer, Quantitative    Collection Time: 01/02/25  3:28 PM   Result Value Ref Range    D-Dimer 0.23 <0.50 mg/L FEU   POCT glucose    Collection Time: 01/02/25  8:49 PM   Result Value Ref Range    POCT Glucose 160 (H) 70 - 110 mg/dL   Phosphorus    Collection Time: 01/03/25  5:10 AM   Result Value Ref Range    Phosphorus 2.6 (L) 2.7 - 4.5 mg/dL   Magnesium    Collection Time: 01/03/25  5:10 AM   Result Value Ref Range    Magnesium 2.0 1.6 - 2.6 mg/dL   Comprehensive Metabolic Panel    Collection Time: 01/03/25  5:10 AM   Result Value Ref Range    Sodium 136 136 - 145 mmol/L    Potassium 4.2 3.5 - 5.1 mmol/L    Chloride 112 (H) 95 - 110 mmol/L    CO2 18 (L) 23 - 29 mmol/L    Glucose 132 (H) 70 - 110 mg/dL    BUN 28 (H) 8 - 23 mg/dL    Creatinine 0.9 0.5 - 1.4 mg/dL    Calcium 8.8 8.7 - 10.5 mg/dL    Total Protein 5.9 (L) 6.0 - 8.4 g/dL    Albumin 2.5 (L) 3.5 - 5.2 g/dL    Total Bilirubin 0.2 0.1 - 1.0 mg/dL     Alkaline Phosphatase 50 40 - 150 U/L    AST 10 10 - 40 U/L    ALT 9 (L) 10 - 44 U/L    eGFR >60 >60 mL/min/1.73 m^2    Anion Gap 6 (L) 8 - 16 mmol/L   CBC Auto Differential    Collection Time: 01/03/25  5:10 AM   Result Value Ref Range    WBC 10.83 3.90 - 12.70 K/uL    RBC 3.94 (L) 4.00 - 5.40 M/uL    Hemoglobin 12.0 12.0 - 16.0 g/dL    Hematocrit 39.0 37.0 - 48.5 %    MCV 99 (H) 82 - 98 fL    MCH 30.5 27.0 - 31.0 pg    MCHC 30.8 (L) 32.0 - 36.0 g/dL    RDW 14.4 11.5 - 14.5 %    Platelets 247 150 - 450 K/uL    MPV 9.9 9.2 - 12.9 fL    Immature Granulocytes 1.8 (H) 0.0 - 0.5 %    Gran # (ANC) 9.4 (H) 1.8 - 7.7 K/uL    Immature Grans (Abs) 0.19 (H) 0.00 - 0.04 K/uL    Lymph # 0.7 (L) 1.0 - 4.8 K/uL    Mono # 0.5 0.3 - 1.0 K/uL    Eos # 0.0 0.0 - 0.5 K/uL    Baso # 0.02 0.00 - 0.20 K/uL    nRBC 0 0 /100 WBC    Gran % 86.9 (H) 38.0 - 73.0 %    Lymph % 6.7 (L) 18.0 - 48.0 %    Mono % 4.4 4.0 - 15.0 %    Eosinophil % 0.0 0.0 - 8.0 %    Basophil % 0.2 0.0 - 1.9 %    Differential Method Automated    POCT glucose    Collection Time: 01/03/25  6:18 AM   Result Value Ref Range    POCT Glucose 119 (H) 70 - 110 mg/dL   Phosphorus    Collection Time: 01/04/25  4:40 AM   Result Value Ref Range    Phosphorus 2.6 (L) 2.7 - 4.5 mg/dL   Magnesium    Collection Time: 01/04/25  4:40 AM   Result Value Ref Range    Magnesium 1.8 1.6 - 2.6 mg/dL   Comprehensive Metabolic Panel    Collection Time: 01/04/25  4:40 AM   Result Value Ref Range    Sodium 136 136 - 145 mmol/L    Potassium 4.5 3.5 - 5.1 mmol/L    Chloride 110 95 - 110 mmol/L    CO2 17 (L) 23 - 29 mmol/L    Glucose 158 (H) 70 - 110 mg/dL    BUN 29 (H) 8 - 23 mg/dL    Creatinine 0.9 0.5 - 1.4 mg/dL    Calcium 8.5 (L) 8.7 - 10.5 mg/dL    Total Protein 5.4 (L) 6.0 - 8.4 g/dL    Albumin 2.3 (L) 3.5 - 5.2 g/dL    Total Bilirubin 0.2 0.1 - 1.0 mg/dL    Alkaline Phosphatase 43 40 - 150 U/L    AST 17 10 - 40 U/L    ALT 13 10 - 44 U/L    eGFR >60 >60 mL/min/1.73 m^2    Anion Gap 9 8 - 16  mmol/L   CBC Auto Differential    Collection Time: 01/04/25  4:40 AM   Result Value Ref Range    WBC 9.19 3.90 - 12.70 K/uL    RBC 3.74 (L) 4.00 - 5.40 M/uL    Hemoglobin 11.6 (L) 12.0 - 16.0 g/dL    Hematocrit 38.1 37.0 - 48.5 %     (H) 82 - 98 fL    MCH 31.0 27.0 - 31.0 pg    MCHC 30.4 (L) 32.0 - 36.0 g/dL    RDW 14.3 11.5 - 14.5 %    Platelets 197 150 - 450 K/uL    MPV 10.0 9.2 - 12.9 fL    Immature Granulocytes 2.8 (H) 0.0 - 0.5 %    Gran # (ANC) 7.6 1.8 - 7.7 K/uL    Immature Grans (Abs) 0.26 (H) 0.00 - 0.04 K/uL    Lymph # 0.9 (L) 1.0 - 4.8 K/uL    Mono # 0.4 0.3 - 1.0 K/uL    Eos # 0.0 0.0 - 0.5 K/uL    Baso # 0.03 0.00 - 0.20 K/uL    nRBC 0 0 /100 WBC    Gran % 83.0 (H) 38.0 - 73.0 %    Lymph % 9.8 (L) 18.0 - 48.0 %    Mono % 4.1 4.0 - 15.0 %    Eosinophil % 0.0 0.0 - 8.0 %    Basophil % 0.3 0.0 - 1.9 %    Differential Method Automated    Urinalysis, Reflex to Urine Culture Urine, Clean Catch    Collection Time: 01/04/25  1:16 PM    Specimen: Urine   Result Value Ref Range    Specimen UA Urine, Clean Catch     Color, UA Yellow Yellow, Straw, Jeanie    Appearance, UA Clear Clear    pH, UA 7.0 5.0 - 8.0    Specific Gravity, UA 1.010 1.005 - 1.030    Protein, UA Negative Negative    Glucose, UA Negative Negative    Ketones, UA Negative Negative    Bilirubin (UA) Negative Negative    Occult Blood UA Negative Negative    Nitrite, UA Negative Negative    Urobilinogen, UA Negative <2.0 EU/dL    Leukocytes, UA Negative Negative   Phosphorus    Collection Time: 01/05/25  5:31 AM   Result Value Ref Range    Phosphorus 2.9 2.7 - 4.5 mg/dL   Magnesium    Collection Time: 01/05/25  5:31 AM   Result Value Ref Range    Magnesium 2.1 1.6 - 2.6 mg/dL   Comprehensive Metabolic Panel    Collection Time: 01/05/25  5:31 AM   Result Value Ref Range    Sodium 136 136 - 145 mmol/L    Potassium 4.2 3.5 - 5.1 mmol/L    Chloride 108 95 - 110 mmol/L    CO2 19 (L) 23 - 29 mmol/L    Glucose 123 (H) 70 - 110 mg/dL    BUN 27 (H) 8 -  23 mg/dL    Creatinine 0.8 0.5 - 1.4 mg/dL    Calcium 8.4 (L) 8.7 - 10.5 mg/dL    Total Protein 5.6 (L) 6.0 - 8.4 g/dL    Albumin 2.5 (L) 3.5 - 5.2 g/dL    Total Bilirubin 0.3 0.1 - 1.0 mg/dL    Alkaline Phosphatase 52 40 - 150 U/L    AST 19 10 - 40 U/L    ALT 22 10 - 44 U/L    eGFR >60 >60 mL/min/1.73 m^2    Anion Gap 9 8 - 16 mmol/L   CBC Auto Differential    Collection Time: 01/05/25  5:31 AM   Result Value Ref Range    WBC 9.97 3.90 - 12.70 K/uL    RBC 3.97 (L) 4.00 - 5.40 M/uL    Hemoglobin 12.3 12.0 - 16.0 g/dL    Hematocrit 40.6 37.0 - 48.5 %     (H) 82 - 98 fL    MCH 31.0 27.0 - 31.0 pg    MCHC 30.3 (L) 32.0 - 36.0 g/dL    RDW 14.6 (H) 11.5 - 14.5 %    Platelets 216 150 - 450 K/uL    MPV 10.2 9.2 - 12.9 fL    Immature Granulocytes 3.0 (H) 0.0 - 0.5 %    Gran # (ANC) 7.9 (H) 1.8 - 7.7 K/uL    Immature Grans (Abs) 0.30 (H) 0.00 - 0.04 K/uL    Lymph # 1.0 1.0 - 4.8 K/uL    Mono # 0.7 0.3 - 1.0 K/uL    Eos # 0.0 0.0 - 0.5 K/uL    Baso # 0.04 0.00 - 0.20 K/uL    nRBC 0 0 /100 WBC    Gran % 79.4 (H) 38.0 - 73.0 %    Lymph % 10.2 (L) 18.0 - 48.0 %    Mono % 7.0 4.0 - 15.0 %    Eosinophil % 0.0 0.0 - 8.0 %    Basophil % 0.4 0.0 - 1.9 %    Differential Method Automated    Phosphorus    Collection Time: 01/06/25  4:53 AM   Result Value Ref Range    Phosphorus 3.0 2.7 - 4.5 mg/dL   Magnesium    Collection Time: 01/06/25  4:53 AM   Result Value Ref Range    Magnesium 2.0 1.6 - 2.6 mg/dL   Comprehensive Metabolic Panel    Collection Time: 01/06/25  4:53 AM   Result Value Ref Range    Sodium 136 136 - 145 mmol/L    Potassium 5.5 (H) 3.5 - 5.1 mmol/L    Chloride 106 95 - 110 mmol/L    CO2 20 (L) 23 - 29 mmol/L    Glucose 96 70 - 110 mg/dL    BUN 32 (H) 8 - 23 mg/dL    Creatinine 0.8 0.5 - 1.4 mg/dL    Calcium 8.4 (L) 8.7 - 10.5 mg/dL    Total Protein 5.7 (L) 6.0 - 8.4 g/dL    Albumin 2.5 (L) 3.5 - 5.2 g/dL    Total Bilirubin 0.3 0.1 - 1.0 mg/dL    Alkaline Phosphatase 47 40 - 150 U/L    AST 27 10 - 40 U/L    ALT 22  10 - 44 U/L    eGFR >60 >60 mL/min/1.73 m^2    Anion Gap 10 8 - 16 mmol/L   CBC Auto Differential    Collection Time: 01/06/25  4:53 AM   Result Value Ref Range    WBC 12.55 3.90 - 12.70 K/uL    RBC 4.20 4.00 - 5.40 M/uL    Hemoglobin 12.7 12.0 - 16.0 g/dL    Hematocrit 41.0 37.0 - 48.5 %    MCV 98 82 - 98 fL    MCH 30.2 27.0 - 31.0 pg    MCHC 31.0 (L) 32.0 - 36.0 g/dL    RDW 14.6 (H) 11.5 - 14.5 %    Platelets 209 150 - 450 K/uL    MPV 9.8 9.2 - 12.9 fL    Immature Granulocytes 3.9 (H) 0.0 - 0.5 %    Gran # (ANC) 9.4 (H) 1.8 - 7.7 K/uL    Immature Grans (Abs) 0.49 (H) 0.00 - 0.04 K/uL    Lymph # 1.7 1.0 - 4.8 K/uL    Mono # 0.9 0.3 - 1.0 K/uL    Eos # 0.0 0.0 - 0.5 K/uL    Baso # 0.06 0.00 - 0.20 K/uL    nRBC 0 0 /100 WBC    Gran % 74.8 (H) 38.0 - 73.0 %    Lymph % 13.5 (L) 18.0 - 48.0 %    Mono % 7.2 4.0 - 15.0 %    Eosinophil % 0.1 0.0 - 8.0 %    Basophil % 0.5 0.0 - 1.9 %    Differential Method Automated    Lactate Dehydrogenase    Collection Time: 01/06/25  4:53 AM   Result Value Ref Range     (H) 110 - 260 U/L   D-Dimer, Quantitative    Collection Time: 01/06/25  4:53 AM   Result Value Ref Range    D-Dimer <0.19 <0.50 mg/L FEU   Ferritin    Collection Time: 01/06/25  4:10 PM   Result Value Ref Range    Ferritin 146 20.0 - 300.0 ng/mL   ISTAT PROCEDURE    Collection Time: 01/06/25  6:35 PM   Result Value Ref Range    POC PH 7.500 (H) 7.35 - 7.45    POC PCO2 26.7 (LL) 35 - 45 mmHg    POC PO2 469 (H) 80 - 100 mmHg    POC HCO3 20.8 (L) 24 - 28 mmol/L    POC BE -2 -2 to 2 mmol/L    POC SATURATED O2 100 95 - 100 %    Sample ARTERIAL     Site RR     Allens Test Pass     DelSys NRB     Mode SPONT     Flow 15    EKG 12-lead    Collection Time: 01/06/25  6:37 PM   Result Value Ref Range    QRS Duration 112 ms    [facility] QTC Calculation 521 ms   Phosphorus    Collection Time: 01/07/25  5:44 AM   Result Value Ref Range    Phosphorus 3.7 2.7 - 4.5 mg/dL   Magnesium    Collection Time: 01/07/25  5:44 AM   Result Value Ref  Range    Magnesium 1.8 1.6 - 2.6 mg/dL   Comprehensive Metabolic Panel    Collection Time: 01/07/25  5:44 AM   Result Value Ref Range    Sodium 139 136 - 145 mmol/L    Potassium 4.3 3.5 - 5.1 mmol/L    Chloride 105 95 - 110 mmol/L    CO2 19 (L) 23 - 29 mmol/L    Glucose 85 70 - 110 mg/dL    BUN 48 (H) 8 - 23 mg/dL    Creatinine 1.3 0.5 - 1.4 mg/dL    Calcium 8.8 8.7 - 10.5 mg/dL    Total Protein 6.0 6.0 - 8.4 g/dL    Albumin 2.7 (L) 3.5 - 5.2 g/dL    Total Bilirubin 0.3 0.1 - 1.0 mg/dL    Alkaline Phosphatase 53 40 - 150 U/L    AST 30 10 - 40 U/L    ALT 25 10 - 44 U/L    eGFR 42 (A) >60 mL/min/1.73 m^2    Anion Gap 15 8 - 16 mmol/L   CBC Auto Differential    Collection Time: 01/07/25  5:44 AM   Result Value Ref Range    WBC 15.81 (H) 3.90 - 12.70 K/uL    RBC 4.24 4.00 - 5.40 M/uL    Hemoglobin 13.2 12.0 - 16.0 g/dL    Hematocrit 40.8 37.0 - 48.5 %    MCV 96 82 - 98 fL    MCH 31.1 (H) 27.0 - 31.0 pg    MCHC 32.4 32.0 - 36.0 g/dL    RDW 14.8 (H) 11.5 - 14.5 %    Platelets 200 150 - 450 K/uL    MPV 10.5 9.2 - 12.9 fL    Immature Granulocytes 2.8 (H) 0.0 - 0.5 %    Gran # (ANC) 10.9 (H) 1.8 - 7.7 K/uL    Immature Grans (Abs) 0.44 (H) 0.00 - 0.04 K/uL    Lymph # 3.2 1.0 - 4.8 K/uL    Mono # 1.2 (H) 0.3 - 1.0 K/uL    Eos # 0.1 0.0 - 0.5 K/uL    Baso # 0.06 0.00 - 0.20 K/uL    nRBC 0 0 /100 WBC    Gran % 68.6 38.0 - 73.0 %    Lymph % 19.9 18.0 - 48.0 %    Mono % 7.7 4.0 - 15.0 %    Eosinophil % 0.6 0.0 - 8.0 %    Basophil % 0.4 0.0 - 1.9 %    Differential Method Automated    Brain natriuretic peptide    Collection Time: 01/07/25  9:18 AM   Result Value Ref Range     (H) 0 - 99 pg/mL   Troponin I    Collection Time: 01/07/25  9:18 AM   Result Value Ref Range    Troponin I 0.008 0.000 - 0.026 ng/mL   Echo Saline Bubble? No; Ultrasound enhancing contrast? No    Collection Time: 01/07/25 10:36 AM   Result Value Ref Range    BSA 1.56 m2    LVOT stroke volume 53.6 cm3    LVIDd 2.9 (A) 3.5 - 6.0 cm    LV Systolic Volume  11.72 mL    LV Systolic Volume Index 7.5 mL/m2    LVIDs 1.9 (A) 2.1 - 4.0 cm    LV Diastolic Volume 33.02 mL    LV Diastolic Volume Index 21.03 mL/m2    Left Ventricular End Systolic Volume by Teichholz Method 11.72 mL    Left Ventricular End Diastolic Volume by Teichholz Method 33.02 mL    IVS 1.2 (A) 0.6 - 1.1 cm    LVOT diameter 1.9 cm    LVOT area 2.8 cm2    FS 34.5 28 - 44 %    Left Ventricle Relative Wall Thickness 0.83 cm    PW 1.2 (A) 0.6 - 1.1 cm    LV mass 104.2 g    LV Mass Index 66.4 g/m2    MV Peak E Reji 0.60 m/s    TDI LATERAL 0.11 m/s    TDI SEPTAL 0.11 m/s    E/E' ratio 5.45 m/s    MV Peak A Reji 0.93 m/s    TR Max Reji 2.70 m/s    E/A ratio 0.65     IVRT 77.07 msec    E wave deceleration time 194.03 msec    LV SEPTAL E/E' RATIO 5.45 m/s    LV LATERAL E/E' RATIO 5.45 m/s    LVOT peak reji 0.9 m/s    Left Ventricular Outflow Tract Mean Velocity 0.61 cm/s    Left Ventricular Outflow Tract Mean Gradient 1.68 mmHg    RVOT peak VTI 10.7 cm    TAPSE 1.59 cm    LA size 2.27 cm    Left Atrium Minor Axis 3.11 cm    Left Atrium Major Axis 3.73 cm    RA Major Axis 3.91 cm    AV mean gradient 7.3 mmHg    AV peak gradient 10.2 mmHg    Ao peak reji 1.6 m/s    Ao VTI 26.9 cm    LVOT peak VTI 18.9 cm    AV valve area 2.0 cm²    AV Velocity Ratio 0.56     AV index (prosthetic) 0.70     OLIVIA by Velocity Ratio 1.6 cm²    MV stenosis pressure 1/2 time 56.27 ms    MV valve area p 1/2 method 3.91 cm2    Triscuspid Valve Regurgitation Peak Gradient 29 mmHg    PV mean gradient 1 mmHg    RVOT peak reji 0.52 m/s    Ao root annulus 3.02 cm    STJ 2.99 cm    Ascending aorta 2.90 cm    IVC diameter 1.04 cm    Mean e' 0.11 m/s    ZLVIDS -3.06     ZLVIDD -4.42     SAI 10.4 mL/m2    LA Vol 16.36 cm3    LA WIDTH 2.5 cm    RA Width 2.5 cm    TV resting pulmonary artery pressure 32 mmHg    RV TB RVSP 6 mmHg    Est. RA pres 3 mmHg   Phosphorus    Collection Time: 01/08/25  6:04 AM   Result Value Ref Range    Phosphorus 3.1 2.7 - 4.5 mg/dL    Magnesium    Collection Time: 01/08/25  6:04 AM   Result Value Ref Range    Magnesium 1.7 1.6 - 2.6 mg/dL   Comprehensive Metabolic Panel    Collection Time: 01/08/25  6:04 AM   Result Value Ref Range    Sodium 137 136 - 145 mmol/L    Potassium 4.3 3.5 - 5.1 mmol/L    Chloride 105 95 - 110 mmol/L    CO2 21 (L) 23 - 29 mmol/L    Glucose 90 70 - 110 mg/dL    BUN 39 (H) 8 - 23 mg/dL    Creatinine 1.1 0.5 - 1.4 mg/dL    Calcium 9.0 8.7 - 10.5 mg/dL    Total Protein 5.7 (L) 6.0 - 8.4 g/dL    Albumin 2.7 (L) 3.5 - 5.2 g/dL    Total Bilirubin 0.4 0.1 - 1.0 mg/dL    Alkaline Phosphatase 52 40 - 150 U/L    AST 20 10 - 40 U/L    ALT 24 10 - 44 U/L    eGFR 51 (A) >60 mL/min/1.73 m^2    Anion Gap 11 8 - 16 mmol/L   CBC Auto Differential    Collection Time: 01/08/25  6:04 AM   Result Value Ref Range    WBC 13.42 (H) 3.90 - 12.70 K/uL    RBC 4.12 4.00 - 5.40 M/uL    Hemoglobin 12.9 12.0 - 16.0 g/dL    Hematocrit 42.3 37.0 - 48.5 %     (H) 82 - 98 fL    MCH 31.3 (H) 27.0 - 31.0 pg    MCHC 30.5 (L) 32.0 - 36.0 g/dL    RDW 15.2 (H) 11.5 - 14.5 %    Platelets 180 150 - 450 K/uL    MPV 10.0 9.2 - 12.9 fL    Immature Granulocytes 2.2 (H) 0.0 - 0.5 %    Gran # (ANC) 10.0 (H) 1.8 - 7.7 K/uL    Immature Grans (Abs) 0.29 (H) 0.00 - 0.04 K/uL    Lymph # 2.5 1.0 - 4.8 K/uL    Mono # 0.5 0.3 - 1.0 K/uL    Eos # 0.1 0.0 - 0.5 K/uL    Baso # 0.05 0.00 - 0.20 K/uL    nRBC 0 0 /100 WBC    Gran % 74.2 (H) 38.0 - 73.0 %    Lymph % 18.8 18.0 - 48.0 %    Mono % 3.5 (L) 4.0 - 15.0 %    Eosinophil % 0.9 0.0 - 8.0 %    Basophil % 0.4 0.0 - 1.9 %    Differential Method Automated    COVID-19 Rapid Screening    Collection Time: 01/08/25  2:30 PM   Result Value Ref Range    SARS-CoV-2 RNA, Amplification, Qual Positive (A) Negative   Phosphorus    Collection Time: 01/09/25  5:08 AM   Result Value Ref Range    Phosphorus 2.8 2.7 - 4.5 mg/dL   Magnesium    Collection Time: 01/09/25  5:08 AM   Result Value Ref Range    Magnesium 1.8 1.6 - 2.6 mg/dL    Comprehensive Metabolic Panel    Collection Time: 01/09/25  5:08 AM   Result Value Ref Range    Sodium 136 136 - 145 mmol/L    Potassium 4.9 3.5 - 5.1 mmol/L    Chloride 105 95 - 110 mmol/L    CO2 23 23 - 29 mmol/L    Glucose 90 70 - 110 mg/dL    BUN 40 (H) 8 - 23 mg/dL    Creatinine 0.9 0.5 - 1.4 mg/dL    Calcium 9.2 8.7 - 10.5 mg/dL    Total Protein 5.6 (L) 6.0 - 8.4 g/dL    Albumin 2.7 (L) 3.5 - 5.2 g/dL    Total Bilirubin 0.3 0.1 - 1.0 mg/dL    Alkaline Phosphatase 54 40 - 150 U/L    AST 19 10 - 40 U/L    ALT 21 10 - 44 U/L    eGFR >60 >60 mL/min/1.73 m^2    Anion Gap 8 8 - 16 mmol/L   CBC Auto Differential    Collection Time: 01/09/25  5:08 AM   Result Value Ref Range    WBC 11.97 3.90 - 12.70 K/uL    RBC 3.82 (L) 4.00 - 5.40 M/uL    Hemoglobin 11.8 (L) 12.0 - 16.0 g/dL    Hematocrit 39.5 37.0 - 48.5 %     (H) 82 - 98 fL    MCH 30.9 27.0 - 31.0 pg    MCHC 29.9 (L) 32.0 - 36.0 g/dL    RDW 15.0 (H) 11.5 - 14.5 %    Platelets 160 150 - 450 K/uL    MPV 10.5 9.2 - 12.9 fL    Immature Granulocytes 2.4 (H) 0.0 - 0.5 %    Gran # (ANC) 9.4 (H) 1.8 - 7.7 K/uL    Immature Grans (Abs) 0.29 (H) 0.00 - 0.04 K/uL    Lymph # 1.4 1.0 - 4.8 K/uL    Mono # 0.8 0.3 - 1.0 K/uL    Eos # 0.1 0.0 - 0.5 K/uL    Baso # 0.04 0.00 - 0.20 K/uL    nRBC 0 0 /100 WBC    Gran % 78.5 (H) 38.0 - 73.0 %    Lymph % 11.6 (L) 18.0 - 48.0 %    Mono % 6.7 4.0 - 15.0 %    Eosinophil % 0.5 0.0 - 8.0 %    Basophil % 0.3 0.0 - 1.9 %    Differential Method Automated    Phosphorus    Collection Time: 01/10/25  5:31 AM   Result Value Ref Range    Phosphorus 3.4 2.7 - 4.5 mg/dL   Magnesium    Collection Time: 01/10/25  5:31 AM   Result Value Ref Range    Magnesium 1.7 1.6 - 2.6 mg/dL   Comprehensive Metabolic Panel    Collection Time: 01/10/25  5:31 AM   Result Value Ref Range    Sodium 134 (L) 136 - 145 mmol/L    Potassium 4.6 3.5 - 5.1 mmol/L    Chloride 104 95 - 110 mmol/L    CO2 18 (L) 23 - 29 mmol/L    Glucose 76 70 - 110 mg/dL    BUN 32 (H) 8 -  23 mg/dL    Creatinine 0.9 0.5 - 1.4 mg/dL    Calcium 9.1 8.7 - 10.5 mg/dL    Total Protein 6.1 6.0 - 8.4 g/dL    Albumin 2.9 (L) 3.5 - 5.2 g/dL    Total Bilirubin 0.5 0.1 - 1.0 mg/dL    Alkaline Phosphatase 54 40 - 150 U/L    AST 18 10 - 40 U/L    ALT 22 10 - 44 U/L    eGFR >60 >60 mL/min/1.73 m^2    Anion Gap 12 8 - 16 mmol/L   CBC Auto Differential    Collection Time: 01/10/25  5:31 AM   Result Value Ref Range    WBC 10.42 3.90 - 12.70 K/uL    RBC 4.18 4.00 - 5.40 M/uL    Hemoglobin 12.9 12.0 - 16.0 g/dL    Hematocrit 42.4 37.0 - 48.5 %     (H) 82 - 98 fL    MCH 30.9 27.0 - 31.0 pg    MCHC 30.4 (L) 32.0 - 36.0 g/dL    RDW 14.7 (H) 11.5 - 14.5 %    Platelets 141 (L) 150 - 450 K/uL    MPV 11.2 9.2 - 12.9 fL    Immature Granulocytes 1.9 (H) 0.0 - 0.5 %    Gran # (ANC) 7.6 1.8 - 7.7 K/uL    Immature Grans (Abs) 0.20 (H) 0.00 - 0.04 K/uL    Lymph # 1.7 1.0 - 4.8 K/uL    Mono # 0.8 0.3 - 1.0 K/uL    Eos # 0.1 0.0 - 0.5 K/uL    Baso # 0.03 0.00 - 0.20 K/uL    nRBC 0 0 /100 WBC    Gran % 73.3 (H) 38.0 - 73.0 %    Lymph % 16.0 (L) 18.0 - 48.0 %    Mono % 7.9 4.0 - 15.0 %    Eosinophil % 0.6 0.0 - 8.0 %    Basophil % 0.3 0.0 - 1.9 %    Differential Method Automated    Phosphorus    Collection Time: 01/11/25  5:57 AM   Result Value Ref Range    Phosphorus 3.1 2.7 - 4.5 mg/dL   Magnesium    Collection Time: 01/11/25  5:57 AM   Result Value Ref Range    Magnesium 1.6 1.6 - 2.6 mg/dL   Comprehensive Metabolic Panel    Collection Time: 01/11/25  5:57 AM   Result Value Ref Range    Sodium 134 (L) 136 - 145 mmol/L    Potassium 4.4 3.5 - 5.1 mmol/L    Chloride 100 95 - 110 mmol/L    CO2 24 23 - 29 mmol/L    Glucose 99 70 - 110 mg/dL    BUN 63 (H) 8 - 23 mg/dL    Creatinine 1.0 0.5 - 1.4 mg/dL    Calcium 9.7 8.7 - 10.5 mg/dL    Total Protein 5.6 (L) 6.0 - 8.4 g/dL    Albumin 2.6 (L) 3.5 - 5.2 g/dL    Total Bilirubin 0.3 0.1 - 1.0 mg/dL    Alkaline Phosphatase 52 40 - 150 U/L    AST 14 10 - 40 U/L    ALT 20 10 - 44 U/L     eGFR 57 (A) >60 mL/min/1.73 m^2    Anion Gap 10 8 - 16 mmol/L   CBC Auto Differential    Collection Time: 01/11/25  5:57 AM   Result Value Ref Range    WBC 10.96 3.90 - 12.70 K/uL    RBC 3.92 (L) 4.00 - 5.40 M/uL    Hemoglobin 12.1 12.0 - 16.0 g/dL    Hematocrit 38.8 37.0 - 48.5 %    MCV 99 (H) 82 - 98 fL    MCH 30.9 27.0 - 31.0 pg    MCHC 31.2 (L) 32.0 - 36.0 g/dL    RDW 14.5 11.5 - 14.5 %    Platelets 152 150 - 450 K/uL    MPV 11.1 9.2 - 12.9 fL    Immature Granulocytes 2.1 (H) 0.0 - 0.5 %    Gran # (ANC) 6.9 1.8 - 7.7 K/uL    Immature Grans (Abs) 0.23 (H) 0.00 - 0.04 K/uL    Lymph # 2.6 1.0 - 4.8 K/uL    Mono # 1.1 (H) 0.3 - 1.0 K/uL    Eos # 0.1 0.0 - 0.5 K/uL    Baso # 0.02 0.00 - 0.20 K/uL    nRBC 0 0 /100 WBC    Gran % 63.2 38.0 - 73.0 %    Lymph % 23.6 18.0 - 48.0 %    Mono % 9.8 4.0 - 15.0 %    Eosinophil % 1.1 0.0 - 8.0 %    Basophil % 0.2 0.0 - 1.9 %    Differential Method Automated    Phosphorus    Collection Time: 01/12/25  5:25 AM   Result Value Ref Range    Phosphorus 3.6 2.7 - 4.5 mg/dL   Magnesium    Collection Time: 01/12/25  5:25 AM   Result Value Ref Range    Magnesium 1.6 1.6 - 2.6 mg/dL   Comprehensive Metabolic Panel    Collection Time: 01/12/25  5:25 AM   Result Value Ref Range    Sodium 137 136 - 145 mmol/L    Potassium 4.4 3.5 - 5.1 mmol/L    Chloride 100 95 - 110 mmol/L    CO2 26 23 - 29 mmol/L    Glucose 112 (H) 70 - 110 mg/dL    BUN 73 (H) 8 - 23 mg/dL    Creatinine 1.2 0.5 - 1.4 mg/dL    Calcium 10.3 8.7 - 10.5 mg/dL    Total Protein 6.2 6.0 - 8.4 g/dL    Albumin 2.9 (L) 3.5 - 5.2 g/dL    Total Bilirubin 0.4 0.1 - 1.0 mg/dL    Alkaline Phosphatase 57 40 - 150 U/L    AST 23 10 - 40 U/L    ALT 31 10 - 44 U/L    eGFR 46 (A) >60 mL/min/1.73 m^2    Anion Gap 11 8 - 16 mmol/L   CBC Auto Differential    Collection Time: 01/12/25  5:25 AM   Result Value Ref Range    WBC 12.63 3.90 - 12.70 K/uL    RBC 4.45 4.00 - 5.40 M/uL    Hemoglobin 13.7 12.0 - 16.0 g/dL    Hematocrit 44.6 37.0 - 48.5 %      (H) 82 - 98 fL    MCH 30.8 27.0 - 31.0 pg    MCHC 30.7 (L) 32.0 - 36.0 g/dL    RDW 14.6 (H) 11.5 - 14.5 %    Platelets 141 (L) 150 - 450 K/uL    MPV 11.2 9.2 - 12.9 fL    Immature Granulocytes 1.8 (H) 0.0 - 0.5 %    Gran # (ANC) 9.0 (H) 1.8 - 7.7 K/uL    Immature Grans (Abs) 0.23 (H) 0.00 - 0.04 K/uL    Lymph # 2.1 1.0 - 4.8 K/uL    Mono # 1.1 (H) 0.3 - 1.0 K/uL    Eos # 0.2 0.0 - 0.5 K/uL    Baso # 0.02 0.00 - 0.20 K/uL    nRBC 0 0 /100 WBC    Gran % 70.8 38.0 - 73.0 %    Lymph % 16.4 (L) 18.0 - 48.0 %    Mono % 8.9 4.0 - 15.0 %    Eosinophil % 1.9 0.0 - 8.0 %    Basophil % 0.2 0.0 - 1.9 %    Differential Method Automated    Phosphorus    Collection Time: 01/13/25  5:06 AM   Result Value Ref Range    Phosphorus 3.0 2.7 - 4.5 mg/dL   Magnesium    Collection Time: 01/13/25  5:06 AM   Result Value Ref Range    Magnesium 1.4 (L) 1.6 - 2.6 mg/dL   Comprehensive Metabolic Panel    Collection Time: 01/13/25  5:06 AM   Result Value Ref Range    Sodium 132 (L) 136 - 145 mmol/L    Potassium 4.2 3.5 - 5.1 mmol/L    Chloride 99 95 - 110 mmol/L    CO2 27 23 - 29 mmol/L    Glucose 168 (H) 70 - 110 mg/dL    BUN 77 (H) 8 - 23 mg/dL    Creatinine 1.2 0.5 - 1.4 mg/dL    Calcium 9.9 8.7 - 10.5 mg/dL    Total Protein 5.4 (L) 6.0 - 8.4 g/dL    Albumin 2.5 (L) 3.5 - 5.2 g/dL    Total Bilirubin 0.3 0.1 - 1.0 mg/dL    Alkaline Phosphatase 49 40 - 150 U/L    AST 17 10 - 40 U/L    ALT 24 10 - 44 U/L    eGFR 46 (A) >60 mL/min/1.73 m^2    Anion Gap 6 (L) 8 - 16 mmol/L   CBC Auto Differential    Collection Time: 01/13/25  5:06 AM   Result Value Ref Range    WBC 10.10 3.90 - 12.70 K/uL    RBC 3.67 (L) 4.00 - 5.40 M/uL    Hemoglobin 11.3 (L) 12.0 - 16.0 g/dL    Hematocrit 36.7 (L) 37.0 - 48.5 %     (H) 82 - 98 fL    MCH 30.8 27.0 - 31.0 pg    MCHC 30.8 (L) 32.0 - 36.0 g/dL    RDW 14.3 11.5 - 14.5 %    Platelets 130 (L) 150 - 450 K/uL    MPV 10.9 9.2 - 12.9 fL    Immature Granulocytes 2.0 (H) 0.0 - 0.5 %    Gran # (ANC) 6.7 1.8 -  7.7 K/uL    Immature Grans (Abs) 0.20 (H) 0.00 - 0.04 K/uL    Lymph # 1.8 1.0 - 4.8 K/uL    Mono # 1.2 (H) 0.3 - 1.0 K/uL    Eos # 0.2 0.0 - 0.5 K/uL    Baso # 0.02 0.00 - 0.20 K/uL    nRBC 0 0 /100 WBC    Gran % 66.4 38.0 - 73.0 %    Lymph % 18.0 18.0 - 48.0 %    Mono % 11.7 4.0 - 15.0 %    Eosinophil % 1.7 0.0 - 8.0 %    Basophil % 0.2 0.0 - 1.9 %    Differential Method Automated    Phosphorus    Collection Time: 01/14/25  5:33 AM   Result Value Ref Range    Phosphorus 3.6 2.7 - 4.5 mg/dL   Magnesium    Collection Time: 01/14/25  5:33 AM   Result Value Ref Range    Magnesium 1.4 (L) 1.6 - 2.6 mg/dL   CBC Auto Differential    Collection Time: 01/14/25  5:33 AM   Result Value Ref Range    WBC 8.02 3.90 - 12.70 K/uL    RBC 3.66 (L) 4.00 - 5.40 M/uL    Hemoglobin 11.3 (L) 12.0 - 16.0 g/dL    Hematocrit 37.1 37.0 - 48.5 %     (H) 82 - 98 fL    MCH 30.9 27.0 - 31.0 pg    MCHC 30.5 (L) 32.0 - 36.0 g/dL    RDW 14.3 11.5 - 14.5 %    Platelets 108 (L) 150 - 450 K/uL    MPV 11.2 9.2 - 12.9 fL    Immature Granulocytes 1.6 (H) 0.0 - 0.5 %    Gran # (ANC) 6.0 1.8 - 7.7 K/uL    Immature Grans (Abs) 0.13 (H) 0.00 - 0.04 K/uL    Lymph # 1.2 1.0 - 4.8 K/uL    Mono # 0.6 0.3 - 1.0 K/uL    Eos # 0.1 0.0 - 0.5 K/uL    Baso # 0.02 0.00 - 0.20 K/uL    nRBC 0 0 /100 WBC    Gran % 74.4 (H) 38.0 - 73.0 %    Lymph % 14.7 (L) 18.0 - 48.0 %    Mono % 7.4 4.0 - 15.0 %    Eosinophil % 1.7 0.0 - 8.0 %    Basophil % 0.2 0.0 - 1.9 %    Differential Method Automated    Basic Metabolic Panel    Collection Time: 01/14/25  5:33 AM   Result Value Ref Range    Sodium 134 (L) 136 - 145 mmol/L    Potassium 4.4 3.5 - 5.1 mmol/L    Chloride 100 95 - 110 mmol/L    CO2 23 23 - 29 mmol/L    Glucose 100 70 - 110 mg/dL    BUN 74 (H) 8 - 23 mg/dL    Creatinine 1.0 0.5 - 1.4 mg/dL    Calcium 10.3 8.7 - 10.5 mg/dL    Anion Gap 11 8 - 16 mmol/L    eGFR 57 (A) >60 mL/min/1.73 m^2   Phosphorus    Collection Time: 01/15/25  4:57 AM   Result Value Ref Range     Phosphorus 3.3 2.7 - 4.5 mg/dL   Magnesium    Collection Time: 01/15/25  4:57 AM   Result Value Ref Range    Magnesium 1.9 1.6 - 2.6 mg/dL   CBC Auto Differential    Collection Time: 01/15/25  4:57 AM   Result Value Ref Range    WBC 8.86 3.90 - 12.70 K/uL    RBC 3.51 (L) 4.00 - 5.40 M/uL    Hemoglobin 10.7 (L) 12.0 - 16.0 g/dL    Hematocrit 34.7 (L) 37.0 - 48.5 %    MCV 99 (H) 82 - 98 fL    MCH 30.5 27.0 - 31.0 pg    MCHC 30.8 (L) 32.0 - 36.0 g/dL    RDW 14.5 11.5 - 14.5 %    Platelets 123 (L) 150 - 450 K/uL    MPV 10.6 9.2 - 12.9 fL    Immature Granulocytes 1.0 (H) 0.0 - 0.5 %    Gran # (ANC) 6.4 1.8 - 7.7 K/uL    Immature Grans (Abs) 0.09 (H) 0.00 - 0.04 K/uL    Lymph # 1.4 1.0 - 4.8 K/uL    Mono # 0.9 0.3 - 1.0 K/uL    Eos # 0.1 0.0 - 0.5 K/uL    Baso # 0.02 0.00 - 0.20 K/uL    nRBC 0 0 /100 WBC    Gran % 71.8 38.0 - 73.0 %    Lymph % 15.6 (L) 18.0 - 48.0 %    Mono % 10.3 4.0 - 15.0 %    Eosinophil % 1.1 0.0 - 8.0 %    Basophil % 0.2 0.0 - 1.9 %    Differential Method Automated    Basic Metabolic Panel    Collection Time: 01/15/25  4:57 AM   Result Value Ref Range    Sodium 133 (L) 136 - 145 mmol/L    Potassium 4.8 3.5 - 5.1 mmol/L    Chloride 101 95 - 110 mmol/L    CO2 23 23 - 29 mmol/L    Glucose 135 (H) 70 - 110 mg/dL    BUN 75 (H) 8 - 23 mg/dL    Creatinine 1.2 0.5 - 1.4 mg/dL    Calcium 9.4 8.7 - 10.5 mg/dL    Anion Gap 9 8 - 16 mmol/L    eGFR 46 (A) >60 mL/min/1.73 m^2   Phosphorus    Collection Time: 01/16/25  5:26 AM   Result Value Ref Range    Phosphorus 2.7 2.7 - 4.5 mg/dL   Magnesium    Collection Time: 01/16/25  5:26 AM   Result Value Ref Range    Magnesium 1.5 (L) 1.6 - 2.6 mg/dL   CBC Auto Differential    Collection Time: 01/16/25  5:26 AM   Result Value Ref Range    WBC 6.37 3.90 - 12.70 K/uL    RBC 3.16 (L) 4.00 - 5.40 M/uL    Hemoglobin 9.8 (L) 12.0 - 16.0 g/dL    Hematocrit 31.7 (L) 37.0 - 48.5 %     (H) 82 - 98 fL    MCH 31.0 27.0 - 31.0 pg    MCHC 30.9 (L) 32.0 - 36.0 g/dL    RDW 14.4  11.5 - 14.5 %    Platelets 116 (L) 150 - 450 K/uL    MPV 10.4 9.2 - 12.9 fL    Immature Granulocytes 0.9 (H) 0.0 - 0.5 %    Gran # (ANC) 4.1 1.8 - 7.7 K/uL    Immature Grans (Abs) 0.06 (H) 0.00 - 0.04 K/uL    Lymph # 1.3 1.0 - 4.8 K/uL    Mono # 0.8 0.3 - 1.0 K/uL    Eos # 0.1 0.0 - 0.5 K/uL    Baso # 0.02 0.00 - 0.20 K/uL    nRBC 0 0 /100 WBC    Gran % 64.7 38.0 - 73.0 %    Lymph % 19.9 18.0 - 48.0 %    Mono % 12.2 4.0 - 15.0 %    Eosinophil % 2.0 0.0 - 8.0 %    Basophil % 0.3 0.0 - 1.9 %    Differential Method Automated    Basic Metabolic Panel    Collection Time: 01/16/25  5:26 AM   Result Value Ref Range    Sodium 132 (L) 136 - 145 mmol/L    Potassium 4.5 3.5 - 5.1 mmol/L    Chloride 100 95 - 110 mmol/L    CO2 22 (L) 23 - 29 mmol/L    Glucose 112 (H) 70 - 110 mg/dL    BUN 68 (H) 8 - 23 mg/dL    Creatinine 1.2 0.5 - 1.4 mg/dL    Calcium 9.1 8.7 - 10.5 mg/dL    Anion Gap 10 8 - 16 mmol/L    eGFR 46 (A) >60 mL/min/1.73 m^2     *Note: Due to a large number of results and/or encounters for the requested time period, some results have not been displayed. A complete set of results can be found in Results Review.         Imaging Results:  Imaging Results              CT Chest Without Contrast (Final result)  Result time 12/31/24 13:59:22      Final result by Tommy Maravilla MD (Timothy) (12/31/24 13:59:22)                   Impression:      Stable suspected pulmonary fibrosis of the lungs.  No acute infiltrates.      Electronically signed by: Tommy Maravilla MD  Date:    12/31/2024  Time:    13:59               Narrative:    EXAMINATION:  CT CHEST WITHOUT CONTRAST    CLINICAL HISTORY:  Cough, persistent;    TECHNIQUE:  Standard noncontrast CT of the chest.  All CT scans at this facility use dose modulation, iterative reconstruction and/or weight based dosing when appropriate to reduce radiation dose to as low as reasonably achievable.    COMPARISON:  CT scan chest, 11/21/2024    FINDINGS:  Heart is normal in size.   Coronary artery calcifications are present.  Atherosclerosis of thoracic aorta.  No evidence of mediastinal hilar adenopathy.  No pericardial effusions.    Stable interstitial lung changes at the lung bases likely related to honeycombing and fibrosis.  No consolidation.  No acute infiltrates.  Airways appear unremarkable.    No adenopathy is identified.    Stable chronic compression deformities of the thoracic spine.                                       The EKG was ordered, reviewed, and independently interpreted by the ED provider.  Interpretation time: 12:49  Rate: 87 BPM  Rhythm: normal sinus rhythm  Interpretation: Left axis deviation  LVH with QRS widening and repolarization abnormality ( R in aVL , [place] product , Romhilt-Stephen )  Cannot rule out Septal infarct ,age undetermined. No STEMI.             The Emergency Provider reviewed the vital signs and test results, which are outlined above.     ED Discussion          Medical Decision Making  DDX: 1. Pneumonia 2. Viral Bronchitis 3. COPD exacerbation    ECG reviewed and no acute ischemic changes, CXR outpatient reviewed and stable, CT chest shows stable emphysema, lab work notable for normal wbc, mild renal insufficiency, mild hypokalemia, covid positive, albuterol, solumedrol given, iv hydration, hosp med to admit    Amount and/or Complexity of Data Reviewed  External Data Reviewed: labs, radiology and notes.     Details: Seen at [facility] for same on 12/27 had negative CXR, negative flu and covid, seen by Pulmonary Dr. Sam today in clinic for  Labs: ordered. Decision-making details documented in ED Course.  Radiology: ordered. Decision-making details documented in ED Course.  ECG/medicine tests: ordered and independent interpretation performed. Decision-making details documented in ED Course.  Discussion of management or test interpretation with external provider(s):   2:16 PM: Discussed case with GALILEO Rdz (Hospital Medicine).   Elvis agrees with current care and management of pt and accepts admission.   Admitting Service:   Admitting Physician: Dr. Leal  Admit to: OBS tele    2:17 PM: Re-evaluated pt. I have discussed test results, shared treatment plan, and the need for admission with patient and family at bedside. Pt and family express understanding at this time and agree with all information. All questions answered. Pt and family have no further questions or concerns at this time. Pt is ready for admit.        Risk  OTC drugs.  Prescription drug management.  Decision regarding hospitalization.  Diagnosis or treatment significantly limited by social determinants of health.                ED Medication(s):  Medications   montelukast tablet 10 mg (10 mg Oral Given 1/16/25 0909)   clopidogreL tablet 75 mg (75 mg Oral Given 1/16/25 0909)   EScitalopram oxalate tablet 20 mg (20 mg Oral Given 1/16/25 0909)   atorvastatin tablet 40 mg (40 mg Oral Given 1/16/25 0910)   pantoprazole EC tablet 40 mg (40 mg Oral Given 1/16/25 2024)   sodium chloride 0.9% flush 10 mL (has no administration in time range)   glucose chewable tablet 16 g (has no administration in time range)   glucose chewable tablet 24 g (has no administration in time range)   dextrose 50% injection 12.5 g (has no administration in time range)   dextrose 50% injection 25 g (has no administration in time range)   glucagon (human recombinant) injection 1 mg (has no administration in time range)   ascorbic acid (vitamin C) tablet 500 mg (500 mg Oral Given 1/16/25 2024)   multivitamin tablet (1 tablet Oral Given 1/16/25 0909)   ondansetron injection 4 mg (has no administration in time range)   gabapentin capsule 200 mg (200 mg Oral Given 1/16/25 2025)   sacubitriL-valsartan 24-26 mg per tablet 1 tablet (1 tablet Oral Given 1/16/25 2023)   metoprolol tartrate (LOPRESSOR) tablet 50 mg (50 mg Oral Given 1/16/25 2025)   acetaminophen tablet 650 mg (650 mg Oral Given 1/13/25 0012)    HYDROcodone-acetaminophen 5-325 mg per tablet 1 tablet (1 tablet Oral Given 1/13/25 1745)   melatonin tablet 6 mg (6 mg Oral Given 1/13/25 0017)   fluticasone propionate 50 mcg/actuation nasal spray 100 mcg (100 mcg Each Nostril Given 1/16/25 0911)   methocarbamoL tablet 500 mg (500 mg Oral Given 1/7/25 0030)   benzonatate capsule 100 mg (100 mg Oral Given 1/16/25 2024)   morphine injection 2 mg (2 mg Intravenous Given 1/16/25 0910)   albuterol-ipratropium 2.5 mg-0.5 mg/3 mL nebulizer solution 3 mL (3 mLs Nebulization Given 1/11/25 1848)   polyethylene glycol packet 17 g (17 g Oral Given 1/16/25 0910)   ALPRAZolam tablet 0.25 mg (0.25 mg Oral Given 1/16/25 2024)   enoxaparin injection 40 mg (40 mg Subcutaneous Given 1/16/25 1826)   magic mouthwash (diphenhydrAMINE 12.5 mg/5 mL 20 mL, aluminum & magnesium hydroxide-simethicone (MYLANTA) 20 mL, LIDOcaine HCl 2% (XYLOCAINE) 20 mL) solution (has no administration in time range)   guaiFENesin 12 hr tablet 1,200 mg (1,200 mg Oral Given 1/16/25 2024)   furosemide tablet 20 mg (20 mg Oral Given 1/16/25 0941)   spironolactone tablet 50 mg (50 mg Oral Given 1/16/25 1513)   levETIRAcetam tablet 1,500 mg (1,500 mg Oral Given 1/16/25 2023)   methylPREDNISolone sodium succinate injection 125 mg (125 mg Intravenous Given 12/31/24 1320)   albuterol-ipratropium 2.5 mg-0.5 mg/3 mL nebulizer solution 3 mL (3 mLs Nebulization Given 12/31/24 1358)   sodium chloride 0.9% bolus 1,000 mL 1,000 mL (0 mLs Intravenous Stopped 12/31/24 1558)   potassium chloride SA CR tablet 40 mEq (40 mEq Oral Given 12/31/24 1457)   magnesium sulfate 2g in water 50mL IVPB (premix) (0 g Intravenous Stopped 12/31/24 1801)   magnesium oxide tablet 400 mg (400 mg Oral Given 12/31/24 1612)   remdesivir 200 mg in 0.9% NaCl 250 mL infusion (0 mg Intravenous Stopped 12/31/24 1745)   remdesivir 100 mg in 0.9% NaCl 250 mL infusion (0 mg Intravenous Stopped 1/4/25 1043)   magnesium sulfate 2g in water 50mL IVPB  (premix) (0 g Intravenous Stopped 1/1/25 1539)   calcium gluconate 1 g in NS IVPB (premixed) (0 g Intravenous Stopped 1/1/25 1338)   acetylcysteine 100 mg/ml (10%) solution 4 mL (4 mLs Nebulization Given 1/3/25 2032)   magnesium sulfate 2g in water 50mL IVPB (premix) (0 g Intravenous Stopped 1/4/25 1214)   potassium, sodium phosphates 280-160-250 mg packet 2 packet (2 packets Oral Given 1/4/25 1534)   acetylcysteine 100 mg/ml (10%) solution 4 mL (4 mLs Nebulization Given 1/4/25 2101)   mupirocin 2 % ointment ( Nasal Given 1/10/25 2231)   morphine injection 2 mg (2 mg Intravenous Given 1/6/25 1836)   furosemide injection 40 mg (40 mg Intravenous Given 1/6/25 1918)   furosemide injection 40 mg (40 mg Intravenous Given 1/7/25 0855)   levalbuterol nebulizer solution 1.2495 mg (1.2495 mg Nebulization Given 1/7/25 0909)   dexAMETHasone injection 6 mg (6 mg Intravenous Given 1/10/25 0957)   iohexoL (OMNIPAQUE 350) injection 100 mL (100 mLs Intravenous Given 1/7/25 1127)   magnesium sulfate 2g in water 50mL IVPB (premix) (0 g Intravenous Stopped 1/14/25 1119)   furosemide injection 40 mg (40 mg Intravenous Given 1/14/25 1248)       Current Discharge Medication List                  Scribe Attestation:   Scribe #1: I performed the above scribed service and the documentation accurately describes the services I performed. I attest to the accuracy of the note.     Attending:   Physician Attestation Statement for Scribe #1: I, Lazara Dalal MD, personally performed the services described in this documentation, as scribed by Catherine Pratt, in my presence, and it is both accurate and complete.           Clinical Impression       ICD-10-CM ICD-9-CM   1. Lab test positive for detection of COVID-19 virus  U07.1 079.89   2. Dyspnea  R06.00 786.09   3. Acute exacerbation of chronic obstructive pulmonary disease (COPD)  J44.1 491.21   4. Acute respiratory insufficiency  R06.89 518.82   5. Hypokalemia  E87.6 276.8   6. Acute renal  insufficiency  N28.9 593.9   7. Numbness and tingling  R20.0 782.0    R20.2    8. Chest pain  R07.9 786.50   9. CHF (congestive heart failure)  I50.9 428.0   10. COVID-19  U07.1 079.89   11. Acute on chronic respiratory failure with hypoxia  J96.21 518.84     799.02   12. Encounter for palliative care  Z51.5 V66.7   13. Hypomagnesemia  E83.42 275.2       Disposition:   Disposition: Placed in Observation  Condition: Fair       Lazara Dalal MD  12/31/24 1447       Lazara Dalal MD  01/16/25 8143

## 2024-12-31 NOTE — ASSESSMENT & PLAN NOTE
Creatine stable for now. BMP reviewed- noted Estimated Creatinine Clearance: 23.2 mL/min (A) (based on SCr of 1.5 mg/dL (H)). according to latest data. Based on current GFR, CKD stage is stage 4 - GFR 15-29.  Monitor UOP and serial BMP and adjust therapy as needed. Renally dose meds. Avoid nephrotoxic medications and procedures.  -Creatinine 1.5 however 3 months ago was 1.2  -Due to GFR, will need to dc ranexa for now and decrease dose of gabapentin  -Hold spironolactone   -Continue Entresto   -Monitor creatinine

## 2024-12-31 NOTE — HPI
Shireen Felix is a 79 year old female who has  has a past medical history of Abnormal ECG, Acute bronchitis and bronchiolitis, Aneurysm, Anticoagulant long-term use, Arthritis, CAD in native artery, COPD (chronic obstructive pulmonary disease), Diabetes mellitus, Fall, Glaucoma, Hypertension, Seizures, Shingles, and Stroke who presented to Emergency Department for evaluation for cough. Associated symptoms include: congestion, wheezing, and SOB. She was seen by her pulmonologist today, Dr. Sam. Started as sinus pain and drainage and now has progressed to respiratory symptoms. She was seen a few days ago in the ER given prescription for antibiotics steroids but these have not helped much. Today in the office she can barely talk without coughing continually. She is having a difficult time putting more than 3 words together without severe cough paroxysms. ED workup showed Hgb/Hct 11.9/37.0, K+ 3.1, CO2 16, BUN/creatinine 34/1.5. CT chest w/out contrast stable suspected pulmonary fibrosis of the lungs. No acute infiltrates. She has received DuoNeb x 1, solumedrol 125 mg IV x 1, KCL 40 mEq PO x 1 and NS 1L bolus. Pt admitted for COVID.

## 2024-12-31 NOTE — ASSESSMENT & PLAN NOTE
Lab Results   Component Value Date    CHOL 112 (L) 07/12/2024    HDL 41 07/12/2024    LDLCALC 49.8 (L) 07/12/2024    TRIG 106 07/12/2024     -Continue Statin

## 2024-12-31 NOTE — ASSESSMENT & PLAN NOTE
Patient's most recent potassium results are listed below.   Recent Labs     12/31/24  1328   K 3.1*     Plan  - Replete potassium per protocol  - Monitor potassium Daily  - Patient's hypokalemia is  being treated. Received KCL 40 mEq PO x 1 in ED

## 2024-12-31 NOTE — PROGRESS NOTES
"Subjective:      Patient ID: Shireen Felix is a 79 y.o. female.    Chief Complaint: Cough and Shortness of Breath    HPI    79-year-old female seen by me in November.  She has chronic scarring of the lungs in a somewhat UIP pattern.  Radiographic findings and symptoms were stable at that time.  She comes in today for problem visit with acute onset of cough, congestion, wheezing and shortness of breath.  Started as sinus pain and drainage and now has progressed to respiratory symptoms.  She was seen a few days ago in the ER given prescription for antibiotics steroids but these have not helped much.  Today in the office she can barely talk without coughing continually.  She is having a difficult time putting more than 3 words together without severe cough paroxysms    Review of Systems as per history of present illness  Objective:     Physical Exam  GEN: ill appearing  HEENT: no acute abnormality  CHEST:  Tight wheezing bilaterally  HEART: regular        12/31/2024    10:28 AM 12/5/2024     9:43 AM 11/21/2024    10:06 AM 9/26/2024     9:37 AM 9/4/2024    10:21 AM 9/4/2024     9:17 AM 8/1/2024     9:18 AM   Pulmonary Function Tests   SpO2 94 % 100 % 98 %  92 % 99 %    Height 5' 4" (1.626 m) 5' 4" (1.626 m) 5' 4" (1.626 m) 5' 4" (1.626 m) 5' 4" (1.626 m)  5' 4" (1.626 m)   Weight 49.2 kg (108 lb 7.5 oz) 49.2 kg (108 lb 7.5 oz) 48.3 kg (106 lb 7.7 oz)  46.4 kg (102 lb 4.7 oz) 47.7 kg (105 lb 4.3 oz) 45.8 kg (101 lb)   BMI (Calculated) 18.6 18.6 18.3  17.5  17.3        Assessment:     1. Acute bronchitis and bronchiolitis      Chest x-ray report from 4 days ago indicates no acute findings    I reviewed labs from the ER visit 4 days ago which were fairly unremarkable    Plan:     Acute likely viral bronchitis with severe symptoms  Recommended she proceed directly to the emergency department  Likely needs IV steroids, continue all bronchodilators and need some opiates for symptom relief  She can follow up with me upon " discharge from the hospital if she gets admitted

## 2024-12-31 NOTE — SUBJECTIVE & OBJECTIVE
Past Medical History:   Diagnosis Date    Abnormal ECG 08/05/2019    Acute bronchitis and bronchiolitis 12/31/2024    Aneurysm     Anticoagulant long-term use     Arthritis     CAD in native artery 08/05/2019    COPD (chronic obstructive pulmonary disease)     Diabetes mellitus     Fall 10/10/2019    Formatting of this note might be different from the original. Found sitting on floor next to bed last night Mild confusion today Does not recall falling or how she ended up on floor UA today Labs yesterday unremarkable    Glaucoma     Hypertension     Seizures     Shingles 05/27/2017    Stroke        Past Surgical History:   Procedure Laterality Date    BRAIN SURGERY      CARDIAC CATHETERIZATION      CATHETERIZATION OF BOTH LEFT AND RIGHT HEART N/A 5/17/2024    Procedure: CATHETERIZATION, HEART, BOTH LEFT AND RIGHT;  Surgeon: Rachelle Sarabia MD;  Location: [facility];  Service: Cardiology;  Laterality: N/A;    COLONOSCOPY N/A 09/21/2023    Procedure: COLONOSCOPY;  Surgeon: Joanna Leal MD;  Location: [facility];  Service: Endoscopy;  Laterality: N/A;    CORONARY ANGIOPLASTY      EPIDURAL STEROID INJECTION INTO CERVICAL SPINE N/A 2/7/2024    Procedure: Cervical IL XANDER, Level C6/7, RN IV sedation;  Surgeon: Francisco Oshea MD;  Location: [facility];  Service: Pain Management;  Laterality: N/A;    EPIDURAL STEROID INJECTION INTO LUMBAR SPINE Bilateral 11/12/2019    Procedure: TF XANDER L4/5;  Surgeon: Desean Dias MD;  Location: [facility];  Service: Pain Management;  Laterality: Bilateral;    HYSTERECTOMY      INJECTION OF ANESTHETIC AGENT AROUND MEDIAL BRANCH NERVES INNERVATING LUMBAR FACET JOINT Bilateral 01/31/2020    Procedure: Bilateral L3-5 MBB;  Surgeon: Desean Dias MD;  Location: [facility];  Service: Pain Management;  Laterality: Bilateral;    INJECTION OF ANESTHETIC AGENT INTO SACROILIAC JOINT Right 11/16/2021    Procedure: Right BLOCK, SACROILIAC JOINT Right GTB with RN IV sedation;   Surgeon: Yao Fulton MD;  [facility];  Service: Pain Management;  Laterality: Right;    INJECTION OF ANESTHETIC AGENT INTO SACROILIAC JOINT Bilateral 07/28/2023    Procedure: Bilateral GT bursa + bilateral SIJ injection;  Surgeon: Francisco Oshea MD;  [facility];  Service: Pain Management;  Laterality: Bilateral;    INJECTION OF JOINT Bilateral 07/09/2020    Procedure: Bilateral shoulder GH Joint injection with local;  Surgeon: Desean Dias MD;  Location: [facility];  Service: Pain Management;  Laterality: Bilateral;    INJECTION OF JOINT Bilateral 07/28/2023    Procedure: Bilateral GT bursa + bilateral SIJ injection NEEDS ADDITIONAL SEDATION AND MORE TIME BEFORE INJECTION RN IV Sedation;  Surgeon: Francisco Oshea MD;  Location: [facility];  Service: Pain Management;  Laterality: Bilateral;    INJECTION OF JOINT N/A 10/25/2023    Procedure: Sacrococcygeal joint injection;  Surgeon: Francisco Oshea MD;  Location: [facility];  Service: Pain Management;  Laterality: N/A;    OOPHORECTOMY      SELECTIVE INJECTION OF ANESTHETIC AGENT AROUND LUMBAR SPINAL NERVE ROOT BY TRANSFORAMINAL APPROACH Bilateral 04/26/2023    Procedure: Bilateral L4/5 TF XANDER RN IV Sedation;  Surgeon: Francisco Oshea MD;  [facility];  Service: Pain Management;  Laterality: Bilateral;    TRANSFORAMINAL EPIDURAL INJECTION OF STEROID Bilateral 07/23/2019    Procedure: Bilateral L3/4 Transforaminal Epidural Steroid Injection;  Surgeon: Desean Dias MD;  [facility];  Service: Pain Management;  Laterality: Bilateral;    TRANSFORAMINAL EPIDURAL INJECTION OF STEROID Bilateral 03/10/2020    Procedure: Right T12/L1 TF XANDER with local;  Surgeon: Desean Dias MD;  [facility];  Service: Pain Management;  Laterality: Bilateral;    TRANSFORAMINAL EPIDURAL INJECTION OF STEROID Right 06/19/2020    Procedure: Right T12/L1 TFESI Covid day of procedure;  Surgeon: Desean Dias MD;   Location: [facility];  Service: Pain Management;  Laterality: Right;       Review of patient's allergies indicates:   Allergen Reactions    Penicillins Anaphylaxis, Hives, Shortness Of Breath and Swelling    Penicillin     Adhesive Rash    Doxycycline Nausea Only    Oxycodone Other (See Comments)     Fatigue      Sulfa (sulfonamide antibiotics) Itching       No current facility-administered medications on file prior to encounter.     Current Outpatient Medications on File Prior to Encounter   Medication Sig    albuterol (PROVENTIL) 2.5 mg /3 mL (0.083 %) nebulizer solution Take 3 mLs (2.5 mg total) by nebulization every 4 (four) hours as needed for Wheezing. Rescue    albuterol-budesonide (AIRSUPRA) 90-80 mcg/actuation Inhale 2 puffs into the lungs every 4 (four) hours as needed (wheezing, cough).    APTIOM 400 mg Tab tablet Take 400 mg by mouth.    atorvastatin (LIPITOR) 40 MG tablet Take 1 tablet (40 mg total) by mouth once daily.    benzonatate (TESSALON) 100 MG capsule Take 100 mg by mouth 3 (three) times daily as needed.    bisoprolol (ZEBETA) 5 MG tablet Take 1 tablet (5 mg total) by mouth once daily.    blood sugar diagnostic Strp To check BG 1 times daily, to use with insurance preferred meter    blood-glucose meter kit To check BG 1 times daily, to use with insurance preferred meter    cholecalciferol, vitamin D3, 1,250 mcg (50,000 unit) capsule Take 1 capsule (50,000 Units total) by mouth every 7 days.    cholestyramine (QUESTRAN) 4 gram packet TAKE 1 PACKET BY MOUTH TWICE DAILY AS DIRECTED    clopidogreL (PLAVIX) 75 mg tablet Take 1 tablet (75 mg total) by mouth once daily.    cromolyn (NASALCROM) 5.2 mg/spray (4 %) nasal spray 1 spray by Nasal route 4 (four) times daily.    denosumab (PROLIA) 60 mg/mL Syrg every 6 (six) months.    dicyclomine (BENTYL) 10 MG capsule Take 1 capsule (10 mg total) by mouth 2 (two) times daily.    dorzolamide-timolol 2-0.5% (COSOPT) 22.3-6.8 mg/mL ophthalmic solution Place  1 drop into both eyes every 12 (twelve) hours.    doxycycline (VIBRAMYCIN) 100 MG Cap Take 100 mg by mouth.    empagliflozin (JARDIANCE) 10 mg tablet Take 1 tablet (10 mg total) by mouth once daily.    EScitalopram oxalate (LEXAPRO) 20 MG tablet Take 1 tablet (20 mg total) by mouth once daily.    fluconazole (DIFLUCAN) 150 MG Tab Take 1 tablet (150 mg total) by mouth once daily.    fluocinonide (LIDEX) 0.05 % external solution Apply topically 2 (two) times daily. Use on itchy spots on scalp and ear    fluticasone propionate (FLONASE) 50 mcg/actuation nasal spray 2 sprays (100 mcg total) by Each Nostril route once daily.    fluticasone-umeclidin-vilanter (TRELEGY ELLIPTA) 200-62.5-25 mcg inhaler Inhale 1 puff into the lungs once daily.    furosemide (LASIX) 20 MG tablet Take 1 tablet (20 mg total) by mouth daily as needed (for edema, swelling, fluid build up, or 3 pound weight gain in 24 hours).    gabapentin (NEURONTIN) 300 MG capsule Take 1 capsule (300 mg total) by mouth 3 (three) times daily.    HYDROcodone-acetaminophen (NORCO) 5-325 mg per tablet Take 1 tablet by mouth every 6 (six) hours as needed for Pain.    hydrOXYzine HCL (ATARAX) 25 MG tablet Take 2 tablets (50 mg total) by mouth 2 (two) times daily as needed for Itching.    ibuprofen (ADVIL,MOTRIN) 800 MG tablet Take 1 tablet (800 mg total) by mouth 3 (three) times daily.    ipratropium (ATROVENT) 42 mcg (0.06 %) nasal spray 2 sprays by Each Nostril route 2 (two) times daily.    isosorbide mononitrate (IMDUR) 120 MG 24 hr tablet Take 1 tablet (120 mg total) by mouth every evening.    ketoconazole (NIZORAL) 2 % cream Apply topically 2 (two) times daily. For dry flaky patches of ear.    lancets Misc To check BG 1 times daily, to use with insurance preferred meter    latanoprost 0.005 % ophthalmic solution Place 1 drop into both eyes every evening.    levETIRAcetam (KEPPRA) 1000 MG tablet Take 1 tablet (1,000 mg total) by mouth 2 (two) times daily.     linezolid (ZYVOX) 600 mg Tab Take 1 tablet (600 mg total) by mouth every 12 (twelve) hours.    magnesium oxide (MAG-OX) 400 mg (241.3 mg magnesium) tablet Take 1 tablet (400 mg total) by mouth once daily.    montelukast (SINGULAIR) 10 mg tablet Take 1 tablet (10 mg total) by mouth once daily.    montelukast (SINGULAIR) 10 mg tablet Take 1 tablet (10 mg total) by mouth once daily.    mucus clearing device (AEROBIKA OSCILLATING PEP SYSTM) by Misc.(Non-Drug; Combo Route) route 4 (four) times daily.    MYRBETRIQ 50 mg Tb24 TAKE 1 TABLET BY MOUTH ONCE DAILY (Patient not taking: Reported on 5/8/2024)    nitroGLYCERIN (NITROSTAT) 0.4 MG SL tablet Place 1 tablet (0.4 mg total) under the tongue every 5 (five) minutes as needed for Chest pain.    omeprazole (PRILOSEC) 40 MG capsule Take 1 capsule (40 mg total) by mouth every morning.    ondansetron (ZOFRAN-ODT) 4 MG TbDL Take 1 tablet (4 mg total) by mouth every 6 (six) hours as needed.    pantoprazole (PROTONIX) 40 MG tablet TAKE 1 TABLET BY MOUTH TWICE DAILY    potassium chloride SA (K-DUR,KLOR-CON) 20 MEQ tablet Take 1 tablet (20 mEq total) by mouth once daily. Take extra dose with Lasix - fluid pill    pramoxine-hydrocortisone (ANALPRAM HC) cream Apply topically 3 (three) times daily.    predniSONE (DELTASONE) 10 MG tablet Take 1 tablet (10 mg total) by mouth once daily.    predniSONE (DELTASONE) 10 MG tablet Take 40 mg by mouth.    ranolazine (RANEXA) 1,000 mg Tb12 Take 1 tablet (1,000 mg total) by mouth 2 (two) times daily.    roflumilast (DALIRESP) 500 mcg Tab TAKE ONE TABLET BY MOUTH DAILY    sacubitriL-valsartan (ENTRESTO) 24-26 mg per tablet Take 1 tablet by mouth 2 (two) times daily.    sodium chloride 3% 3 % nebulizer solution Take 4 mLs by nebulization as needed for Cough (chest congestion).    spironolactone (ALDACTONE) 50 MG tablet Take 1 tablet (50 mg total) by mouth once daily.    tezepelumab-ekko 210 mg/1.91 mL (110 mg/mL) Sparkle Inject 210 mg into the skin  every 28 days.    tiZANidine (ZANAFLEX) 4 MG tablet TAKE 1 TABLET BY MOUTH EVERY TWELVE HOURS AS NEEDED    triamcinolone acetonide 0.025% (KENALOG) 0.025 % cream Apply topically 2 (two) times daily. PRN rash and itching of ear. Mild steroid cream.     Family History       Problem Relation (Age of Onset)    Breast cancer Maternal Aunt, Maternal Aunt, Maternal Aunt    No Known Problems Mother, Father          Tobacco Use    Smoking status: Former     Current packs/day: 0.00     Average packs/day: 1 pack/day for 42.3 years (42.3 ttl pk-yrs)     Types: Cigarettes     Start date:      Quit date: 2004     Years since quittin.7    Smokeless tobacco: Former   Substance and Sexual Activity    Alcohol use: No    Drug use: Never    Sexual activity: Not Currently     Partners: Male     Review of Systems   Constitutional:  Positive for activity change.   HENT:  Positive for congestion, postnasal drip and sinus pressure. Negative for trouble swallowing.    Eyes:  Negative for visual disturbance.   Respiratory:  Positive for cough, shortness of breath and wheezing. Negative for chest tightness.    Cardiovascular:  Negative for chest pain and leg swelling.   Gastrointestinal:  Negative for abdominal pain, constipation, diarrhea, nausea and vomiting.   Genitourinary:  Negative for difficulty urinating.   Musculoskeletal:  Negative for arthralgias, gait problem, joint swelling, myalgias and neck stiffness.   Skin:  Negative for color change.   Neurological:  Positive for weakness. Negative for seizures, syncope, facial asymmetry, speech difficulty, light-headedness, numbness and headaches.   Psychiatric/Behavioral:  Negative for agitation, behavioral problems and confusion.      Objective:     Vital Signs (Most Recent):  Temp: 98 °F (36.7 °C) (24 1146)  Pulse: 79 (24 1358)  Resp: (!) 30 (24 1358)  BP: (!) 108/57 (24 1146)  SpO2: 100 % (24 1358) Vital Signs (24h Range):  Temp:  [98 °F (36.7  °C)] 98 °F (36.7 °C)  Pulse:  [79-89] 79  Resp:  [16-36] 30  SpO2:  [94 %-100 %] 100 %  BP: (108-125)/(57-60) 108/57     Weight: 49.2 kg (108 lb 6.4 oz)  Body mass index is 18.61 kg/m².     Physical Exam  Vitals reviewed.   Constitutional:       General: She is not in acute distress.  HENT:      Head: Normocephalic.      Mouth/Throat:      Mouth: Mucous membranes are moist.   Cardiovascular:      Rate and Rhythm: Normal rate.      Pulses: Normal pulses.   Pulmonary:      Breath sounds: Wheezing present.   Chest:      Chest wall: Tenderness present.   Abdominal:      General: Bowel sounds are normal. There is no distension.      Palpations: Abdomen is soft.      Tenderness: There is no abdominal tenderness.   Genitourinary:     Comments: deferred  Musculoskeletal:      Cervical back: Neck supple.   Neurological:      Mental Status: She is alert.                Significant Labs: All pertinent labs within the past 24 hours have been reviewed.  CBC:   Recent Labs   Lab 12/31/24  1328   WBC 10.06   HGB 11.9*   HCT 37.0        CMP:   Recent Labs   Lab 12/31/24  1328      K 3.1*      CO2 16*   *   BUN 34*   CREATININE 1.5*   CALCIUM 8.3*   PROT 6.8   ALBUMIN 2.9*   BILITOT 0.3   ALKPHOS 45   AST 12   ALT 10   ANIONGAP 14     Magnesium:   Recent Labs   Lab 12/31/24  1328   MG <0.7*     Troponin:   Recent Labs   Lab 12/31/24  1328   TROPONINI <0.006     Urine Studies:   Recent Labs   Lab 12/31/24  1520   COLORU Yellow   APPEARANCEUA Clear   PHUR 6.0   SPECGRAV 1.020   PROTEINUA 1+*   GLUCUA 1+*   KETONESU Negative   BILIRUBINUA Negative   OCCULTUA Negative   NITRITE Negative   UROBILINOGEN Negative   LEUKOCYTESUR 2+*   RBCUA 0   WBCUA 6*   BACTERIA Rare   HYALINECASTS 1       Significant Imaging:   Imaging Results              CT Chest Without Contrast (Final result)  Result time 12/31/24 13:59:22      Final result by Tommy Maravilla MD (Timothy) (12/31/24 13:59:22)                   Impression:       Stable suspected pulmonary fibrosis of the lungs.  No acute infiltrates.      Electronically signed by: Tommy Maravilla MD  Date:    12/31/2024  Time:    13:59               Narrative:    EXAMINATION:  CT CHEST WITHOUT CONTRAST    CLINICAL HISTORY:  Cough, persistent;    TECHNIQUE:  Standard noncontrast CT of the chest.  All CT scans at this facility use dose modulation, iterative reconstruction and/or weight based dosing when appropriate to reduce radiation dose to as low as reasonably achievable.    COMPARISON:  CT scan chest, 11/21/2024    FINDINGS:  Heart is normal in size.  Coronary artery calcifications are present.  Atherosclerosis of thoracic aorta.  No evidence of mediastinal hilar adenopathy.  No pericardial effusions.    Stable interstitial lung changes at the lung bases likely related to honeycombing and fibrosis.  No consolidation.  No acute infiltrates.  Airways appear unremarkable.    No adenopathy is identified.    Stable chronic compression deformities of the thoracic spine.

## 2024-12-31 NOTE — ASSESSMENT & PLAN NOTE
Anemia is likely due to chronic disease due to Chronic Kidney Disease. Most recent hemoglobin and hematocrit are listed below.  Recent Labs     12/31/24  1328   HGB 11.9*   HCT 37.0     Plan  - Monitor serial CBC: Daily  - Transfuse PRBC if patient becomes hemodynamically unstable, symptomatic or H/H drops below 7/21.  - Patient has not received any PRBC transfusions to date  - Patient's anemia is currently stable

## 2024-12-31 NOTE — ASSESSMENT & PLAN NOTE
Patient has Diastolic (HFpEF) heart failure that is Chronic. On presentation their CHF was well compensated. Most recent BNP and echo results are listed below.  Recent Labs     12/31/24  1328   BNP 92     Latest ECHO  Results for orders placed during the hospital encounter of 06/02/23    Echo    Interpretation Summary  · The left ventricle is normal in size with concentric hypertrophy and low normal systolic function.  · The estimated ejection fraction is 50%.  · Normal left ventricular diastolic function.  · There is abnormal septal wall motion consistent with left bundle branch block.  · Normal right ventricular size with normal right ventricular systolic function.  · Normal central venous pressure (3 mmHg).  · The estimated PA systolic pressure is 30 mmHg.  · Mild mitral regurgitation.    Current Heart Failure Medications  spironolactone tablet 50 mg, Daily, Oral    Plan  - Monitor strict I&Os and daily weights.    - Place on telemetry  - Low sodium diet  - Place on fluid restriction of 1.5 L.   - Cardiology has not been consulted  - The patient's volume status is at their baseline

## 2024-12-31 NOTE — TELEPHONE ENCOUNTER
----- Message from Anny sent at 12/31/2024  8:22 AM CST -----  Contact: self [phone]  Type: Orders Request    What orders/ testing are being requested?blood work    Is there a future appointment scheduled for the patient with PCP?none    When?    Would you prefer a response via [facility]?call back     Comments: patient is requesting labs for blood work before she is seen in the office today, she thinks this will help with her visit due to the symptoms she is experiencing. Please call back [phone]. Thanks ajrek

## 2024-12-31 NOTE — ASSESSMENT & PLAN NOTE
Patient is identified as Severe COVID-19 based on hypoxemia with O2 saturations <94% on room air or on ambulation   Initiate standard COVID protocols; COVID-19 testing ,Infection Control notification  and isolation- respiratory, contact and droplet per protocol    Diagnostics: CBC, CMP, Ferritin, CRP, Troponin, and Portable CXR    Management: Initiate targeted therapy with Remdesivir, 200mg IV x1, followed by 100mg IV daily x5 days total and Anticoagulation, Patient admitted to non-critical care unit- Will initiate full dose anticoagulation with Weight based lovenox 1mg/kg IV q12, Maintain oxygen saturations 92-96% via Nasal Cannula  LPM and monitor with continuous/intermittent pulse oximetry. , Inhaled bronchodilators as needed for shortness of breath., and Continuous cardiac monitoring.    Advance Care Planning  Current advance care plan has been discussed with patient/family/POA and patient currently wishes Full Code.

## 2024-12-31 NOTE — PHARMACY MED REC
"  Admission Medication History     The home medication history was taken by Lizy Da Silva.    You may go to "Admission" then "Reconcile Home Medications" tabs to review and/or act upon these items.     The home medication list has been updated by the Pharmacy department.   Please read ALL comments highlighted in yellow.   Please address this information as you see fit.    Feel free to contact us if you have any questions or require assistance.      The medications listed below were removed from the home medication list. Please reorder if appropriate:  Patient reports no longer taking the following medication(s):  Zofran 4 mg  Fluconazole 150 mg  Norco 5-325 mg  Ibuprofen 800 mg      Medications listed below were obtained from: Patient/family and Analytic [employer]- [employer]      LAST MED REC COMPLETED:   Lizyrosalina Da Silva  [id number]    Current Outpatient Medications on File Prior to Encounter   Medication Sig Dispense Refill Last Dose/Taking    albuterol (PROVENTIL) 2.5 mg /3 mL (0.083 %) nebulizer solution Take 3 mLs (2.5 mg total) by nebulization every 4 (four) hours as needed for Wheezing. Rescue 180 mL 11 12/30/2024    albuterol-budesonide (AIRSUPRA) 90-80 mcg/actuation Inhale 2 puffs into the lungs every 4 (four) hours as needed (wheezing, cough). 10.7 g 11 12/30/2024    APTIOM 400 mg Tab tablet Take 400 mg by mouth once daily.   12/31/2024    atorvastatin (LIPITOR) 40 MG tablet Take 1 tablet (40 mg total) by mouth once daily. 90 tablet 1 12/31/2024    benzonatate (TESSALON) 100 MG capsule Take 100 mg by mouth 3 (three) times daily as needed.   12/31/2024    bisoprolol (ZEBETA) 5 MG tablet Take 1 tablet (5 mg total) by mouth once daily. 30 tablet 11 12/31/2024    cholecalciferol, vitamin D3, 1,250 mcg (50,000 unit) capsule Take 1 capsule (50,000 Units total) by mouth every 7 days. 12 capsule 0 Past Week    cholestyramine (QUESTRAN) 4 gram packet TAKE 1 PACKET BY MOUTH TWICE DAILY AS DIRECTED (Patient taking " differently: Take 4 g by mouth 2 (two) times daily.) 60 packet 11 12/31/2024    clopidogreL (PLAVIX) 75 mg tablet Take 1 tablet (75 mg total) by mouth once daily. 90 tablet 3 12/31/2024    cromolyn (NASALCROM) 5.2 mg/spray (4 %) nasal spray 1 spray by Nasal route 4 (four) times daily. 13 mL 11 12/31/2024    doxycycline (VIBRAMYCIN) 100 MG Cap Take 100 mg by mouth every 12 (twelve) hours.   12/31/2024    empagliflozin (JARDIANCE) 10 mg tablet Take 1 tablet (10 mg total) by mouth once daily. 90 tablet 3 12/31/2024    EScitalopram oxalate (LEXAPRO) 20 MG tablet Take 1 tablet (20 mg total) by mouth once daily. 90 tablet 2 12/31/2024    fluticasone propionate (FLONASE) 50 mcg/actuation nasal spray 2 sprays (100 mcg total) by Each Nostril route once daily. 16 g 11 Past Week    fluticasone-umeclidin-vilanter (TRELEGY ELLIPTA) 200-62.5-25 mcg inhaler Inhale 1 puff into the lungs once daily. 60 each 11 12/31/2024    furosemide (LASIX) 20 MG tablet Take 1 tablet (20 mg total) by mouth daily as needed (for edema, swelling, fluid build up, or 3 pound weight gain in 24 hours). 30 tablet 11 12/31/2024    gabapentin (NEURONTIN) 300 MG capsule Take 1 capsule (300 mg total) by mouth 3 (three) times daily. 90 capsule 1 12/31/2024    hydrOXYzine HCL (ATARAX) 25 MG tablet Take 2 tablets (50 mg total) by mouth 2 (two) times daily as needed for Itching. 60 tablet 11 12/31/2024    isosorbide mononitrate (IMDUR) 120 MG 24 hr tablet Take 1 tablet (120 mg total) by mouth every evening. 90 tablet 3 12/31/2024    latanoprost 0.005 % ophthalmic solution Place 1 drop into both eyes every evening. 2.5 mL 3 12/30/2024    levETIRAcetam (KEPPRA) 1000 MG tablet Take 1 tablet (1,000 mg total) by mouth 2 (two) times daily. 60 tablet 11 12/31/2024    linezolid (ZYVOX) 600 mg Tab Take 1 tablet (600 mg total) by mouth every 12 (twelve) hours. 14 tablet 0 12/31/2024    magnesium oxide (MAG-OX) 400 mg (241.3 mg magnesium) tablet Take 1 tablet (400 mg total)  by mouth once daily. 90 tablet 1 12/31/2024    montelukast (SINGULAIR) 10 mg tablet Take 1 tablet (10 mg total) by mouth once daily. 90 tablet 3 12/31/2024    pantoprazole (PROTONIX) 40 MG tablet TAKE 1 TABLET BY MOUTH TWICE DAILY (Patient taking differently: Take 40 mg by mouth 2 (two) times daily.) 60 tablet 11 12/31/2024    potassium chloride SA (K-DUR,KLOR-CON) 20 MEQ tablet Take 1 tablet (20 mEq total) by mouth once daily. Take extra dose with Lasix - fluid pill 90 tablet 1 12/31/2024    predniSONE (DELTASONE) 10 MG tablet Take 40 mg by mouth once daily.   12/31/2024    ranolazine (RANEXA) 1,000 mg Tb12 Take 1 tablet (1,000 mg total) by mouth 2 (two) times daily. 180 tablet 3 12/31/2024    roflumilast (DALIRESP) 500 mcg Tab TAKE ONE TABLET BY MOUTH DAILY (Patient taking differently: Take 1 tablet by mouth once daily.) 90 tablet 3 12/31/2024    sacubitriL-valsartan (ENTRESTO) 24-26 mg per tablet Take 1 tablet by mouth 2 (two) times daily. 180 tablet 3 12/31/2024    sodium chloride 3% 3 % nebulizer solution Take 4 mLs by nebulization as needed for Cough (chest congestion). 360 mL 11 12/30/2024    spironolactone (ALDACTONE) 50 MG tablet Take 1 tablet (50 mg total) by mouth once daily. 90 tablet 1 12/31/2024    tiZANidine (ZANAFLEX) 4 MG tablet TAKE 1 TABLET BY MOUTH EVERY TWELVE HOURS AS NEEDED (Patient taking differently: Take 4 mg by mouth every 12 (twelve) hours.) 60 tablet 1 12/30/2024    blood sugar diagnostic Strp To check BG 1 times daily, to use with insurance preferred meter 100 strip 3     blood-glucose meter kit To check BG 1 times daily, to use with insurance preferred meter 1 each 0     denosumab (PROLIA) 60 mg/mL Syrg every 6 (six) months.   Unknown    dicyclomine (BENTYL) 10 MG capsule Take 1 capsule (10 mg total) by mouth 2 (two) times daily. 10 capsule 0 Unknown    dorzolamide-timolol 2-0.5% (COSOPT) 22.3-6.8 mg/mL ophthalmic solution Place 1 drop into both eyes every 12 (twelve) hours. (Patient  not taking: Reported on 12/31/2024) 10 mL 3 Not Taking    fluocinonide (LIDEX) 0.05 % external solution Apply topically 2 (two) times daily. Use on itchy spots on scalp and ear 60 mL 2 Unknown    ipratropium (ATROVENT) 42 mcg (0.06 %) nasal spray 2 sprays by Each Nostril route 2 (two) times daily. 15 mL 11 Unknown    ketoconazole (NIZORAL) 2 % cream Apply topically 2 (two) times daily. For dry flaky patches of ear. 30 g 1 Unknown    lancets Misc To check BG 1 times daily, to use with insurance preferred meter 100 each 3     mucus clearing device (AEROBIKA OSCILLATING PEP SYSTM) by Misc.(Non-Drug; Combo Route) route 4 (four) times daily. 1 each 0     MYRBETRIQ 50 mg Tb24 TAKE 1 TABLET BY MOUTH ONCE DAILY (Patient not taking: Reported on 5/8/2024) 30 tablet 0     nitroGLYCERIN (NITROSTAT) 0.4 MG SL tablet Place 1 tablet (0.4 mg total) under the tongue every 5 (five) minutes as needed for Chest pain. 100 tablet 0 Unknown    omeprazole (PRILOSEC) 40 MG capsule Take 1 capsule (40 mg total) by mouth every morning. 90 capsule 3 Unknown    pramoxine-hydrocortisone (ANALPRAM HC) cream Apply topically 3 (three) times daily. 57 g 11 Unknown    tezepelumab-ekko 210 mg/1.91 mL (110 mg/mL) PnIj Inject 210 mg into the skin every 28 days. 1.91 mL 11 Unknown    triamcinolone acetonide 0.025% (KENALOG) 0.025 % cream Apply topically 2 (two) times daily. PRN rash and itching of ear. Mild steroid cream. 30 g 0 Unknown                         .

## 2024-12-31 NOTE — H&P
[facility]    Patient Name: Shireen Felix  MRN: [id number]  Patient Class: OP- Observation  Admission Date: 12/31/2024  Attending Physician: Jalen Leal DO   Primary Care Provider: Laron Chaudhari MD         Patient information was obtained from patient and ER records.     Subjective:     Principal Problem:COVID-19    Chief Complaint:   Chief Complaint   Patient presents with    Cough     Pt states she was sent here by Dr. Sam for admission due to cough and wheezing r/t bronchitis.  She was seen in the ED recently and prescribed medications with no improvement.        HPI: Shireen Felix is a 79 year old female who has  has a past medical history of Abnormal ECG, Acute bronchitis and bronchiolitis, Aneurysm, Anticoagulant long-term use, Arthritis, CAD in native artery, COPD (chronic obstructive pulmonary disease), Diabetes mellitus, Fall, Glaucoma, Hypertension, Seizures, Shingles, and Stroke who presented to Emergency Department for evaluation for cough. Associated symptoms include: congestion, wheezing, and SOB. She was seen by her pulmonologist today, Dr. Sam. Started as sinus pain and drainage and now has progressed to respiratory symptoms. She was seen a few days ago in the ER given prescription for antibiotics steroids but these have not helped much. Today in the office she can barely talk without coughing continually. She is having a difficult time putting more than 3 words together without severe cough paroxysms. ED workup showed Hgb/Hct 11.9/37.0, K+ 3.1, CO2 16, BUN/creatinine 34/1.5. CT chest w/out contrast stable suspected pulmonary fibrosis of the lungs. No acute infiltrates. She has received DuoNeb x 1, solumedrol 125 mg IV x 1, KCL 40 mEq PO x 1 and NS 1L bolus. Pt admitted for COVID.     Past Medical History:   Diagnosis Date    Abnormal ECG 08/05/2019    Acute bronchitis and bronchiolitis 12/31/2024    Aneurysm      Anticoagulant long-term use     Arthritis     CAD in native artery 08/05/2019    COPD (chronic obstructive pulmonary disease)     Diabetes mellitus     Fall 10/10/2019    Formatting of this note might be different from the original. Found sitting on floor next to bed last night Mild confusion today Does not recall falling or how she ended up on floor UA today Labs yesterday unremarkable    Glaucoma     Hypertension     Seizures     Shingles 05/27/2017    Stroke        Past Surgical History:   Procedure Laterality Date    BRAIN SURGERY      CARDIAC CATHETERIZATION      CATHETERIZATION OF BOTH LEFT AND RIGHT HEART N/A 5/17/2024    Procedure: CATHETERIZATION, HEART, BOTH LEFT AND RIGHT;  Surgeon: Rachelle Sarabia MD;  Location: [facility] CATH LAB;  Service: Cardiology;  Laterality: N/A;    COLONOSCOPY N/A 09/21/2023    Procedure: COLONOSCOPY;  Surgeon: Joanna Leal MD;  Location: [facility] ENDO;  Service: Endoscopy;  Laterality: N/A;    CORONARY ANGIOPLASTY      EPIDURAL STEROID INJECTION INTO CERVICAL SPINE N/A 2/7/2024    Procedure: Cervical IL XANDER, Level C6/7, RN IV sedation;  Surgeon: Francisco Oshea MD;  Location: [facility] PAIN MGT;  Service: Pain Management;  Laterality: N/A;    EPIDURAL STEROID INJECTION INTO LUMBAR SPINE Bilateral 11/12/2019    Procedure: TF XANDER L4/5;  Surgeon: Desean Dias MD;  Location: [facility] PAIN MGT;  Service: Pain Management;  Laterality: Bilateral;    HYSTERECTOMY      INJECTION OF ANESTHETIC AGENT AROUND MEDIAL BRANCH NERVES INNERVATING LUMBAR FACET JOINT Bilateral 01/31/2020    Procedure: Bilateral L3-5 MBB;  Surgeon: Desean Dias MD;  Location: [facility] PAIN MGT;  Service: Pain Management;  Laterality: Bilateral;    INJECTION OF ANESTHETIC AGENT INTO SACROILIAC JOINT Right 11/16/2021    Procedure: Right BLOCK, SACROILIAC JOINT Right GTB with RN IV sedation;  Surgeon: Yao Fulton MD;  Location: [facility] PAIN MGT;  Service: Pain Management;  Laterality: Right;    INJECTION OF ANESTHETIC  AGENT INTO SACROILIAC JOINT Bilateral 07/28/2023    Procedure: Bilateral GT bursa + bilateral SIJ injection;  Surgeon: Francisco Oshea MD;  [facility];  Service: Pain Management;  Laterality: Bilateral;    INJECTION OF JOINT Bilateral 07/09/2020    Procedure: Bilateral shoulder GH Joint injection with local;  Surgeon: Desean Dias MD;  Location: [facility] PAIN MGT;  Service: Pain Management;  Laterality: Bilateral;    INJECTION OF JOINT Bilateral 07/28/2023    Procedure: Bilateral GT bursa + bilateral SIJ injection NEEDS ADDITIONAL SEDATION AND MORE TIME BEFORE INJECTION RN IV Sedation;  Surgeon: Francisco Oshea MD;  Location: [facility] PAIN MGT;  Service: Pain Management;  Laterality: Bilateral;    INJECTION OF JOINT N/A 10/25/2023    Procedure: Sacrococcygeal joint injection;  Surgeon: Francisco Oshea MD;  Location: [facility] PAIN MGT;  Service: Pain Management;  Laterality: N/A;    OOPHORECTOMY      SELECTIVE INJECTION OF ANESTHETIC AGENT AROUND LUMBAR SPINAL NERVE ROOT BY TRANSFORAMINAL APPROACH Bilateral 04/26/2023    Procedure: Bilateral L4/5 TF XANDER RN IV Sedation;  Surgeon: Francisco Oshea MD;  Location: [facility] PAIN MGT;  Service: Pain Management;  Laterality: Bilateral;    TRANSFORAMINAL EPIDURAL INJECTION OF STEROID Bilateral 07/23/2019    Procedure: Bilateral L3/4 Transforaminal Epidural Steroid Injection;  Surgeon: Desean Dias MD;  [facility];  Service: Pain Management;  Laterality: Bilateral;    TRANSFORAMINAL EPIDURAL INJECTION OF STEROID Bilateral 03/10/2020    Procedure: Right T12/L1 TF XANDER with local;  Surgeon: Desean Dias MD;  [facility];  Service: Pain Management;  Laterality: Bilateral;    TRANSFORAMINAL EPIDURAL INJECTION OF STEROID Right 06/19/2020    Procedure: Right T12/L1 TFESI Covid day of procedure;  Surgeon: Desean Dias MD;  Location: [facility] PAIN MGT;  Service: Pain Management;  Laterality: Right;       Review of patient's allergies indicates:   Allergen  Reactions    Penicillins Anaphylaxis, Hives, Shortness Of Breath and Swelling    Penicillin     Adhesive Rash    Doxycycline Nausea Only    Oxycodone Other (See Comments)     Fatigue      Sulfa (sulfonamide antibiotics) Itching       No current facility-administered medications on file prior to encounter.     Current Outpatient Medications on File Prior to Encounter   Medication Sig    albuterol (PROVENTIL) 2.5 mg /3 mL (0.083 %) nebulizer solution Take 3 mLs (2.5 mg total) by nebulization every 4 (four) hours as needed for Wheezing. Rescue    albuterol-budesonide (AIRSUPRA) 90-80 mcg/actuation Inhale 2 puffs into the lungs every 4 (four) hours as needed (wheezing, cough).    APTIOM 400 mg Tab tablet Take 400 mg by mouth.    atorvastatin (LIPITOR) 40 MG tablet Take 1 tablet (40 mg total) by mouth once daily.    benzonatate (TESSALON) 100 MG capsule Take 100 mg by mouth 3 (three) times daily as needed.    bisoprolol (ZEBETA) 5 MG tablet Take 1 tablet (5 mg total) by mouth once daily.    blood sugar diagnostic Strp To check BG 1 times daily, to use with insurance preferred meter    blood-glucose meter kit To check BG 1 times daily, to use with insurance preferred meter    cholecalciferol, vitamin D3, 1,250 mcg (50,000 unit) capsule Take 1 capsule (50,000 Units total) by mouth every 7 days.    cholestyramine (QUESTRAN) 4 gram packet TAKE 1 PACKET BY MOUTH TWICE DAILY AS DIRECTED    clopidogreL (PLAVIX) 75 mg tablet Take 1 tablet (75 mg total) by mouth once daily.    cromolyn (NASALCROM) 5.2 mg/spray (4 %) nasal spray 1 spray by Nasal route 4 (four) times daily.    denosumab (PROLIA) 60 mg/mL Syrg every 6 (six) months.    dicyclomine (BENTYL) 10 MG capsule Take 1 capsule (10 mg total) by mouth 2 (two) times daily.    dorzolamide-timolol 2-0.5% (COSOPT) 22.3-6.8 mg/mL ophthalmic solution Place 1 drop into both eyes every 12 (twelve) hours.    doxycycline (VIBRAMYCIN) 100 MG Cap Take 100 mg by mouth.    empagliflozin  (JARDIANCE) 10 mg tablet Take 1 tablet (10 mg total) by mouth once daily.    EScitalopram oxalate (LEXAPRO) 20 MG tablet Take 1 tablet (20 mg total) by mouth once daily.    fluconazole (DIFLUCAN) 150 MG Tab Take 1 tablet (150 mg total) by mouth once daily.    fluocinonide (LIDEX) 0.05 % external solution Apply topically 2 (two) times daily. Use on itchy spots on scalp and ear    fluticasone propionate (FLONASE) 50 mcg/actuation nasal spray 2 sprays (100 mcg total) by Each Nostril route once daily.    fluticasone-umeclidin-vilanter (TRELEGY ELLIPTA) 200-62.5-25 mcg inhaler Inhale 1 puff into the lungs once daily.    furosemide (LASIX) 20 MG tablet Take 1 tablet (20 mg total) by mouth daily as needed (for edema, swelling, fluid build up, or 3 pound weight gain in 24 hours).    gabapentin (NEURONTIN) 300 MG capsule Take 1 capsule (300 mg total) by mouth 3 (three) times daily.    HYDROcodone-acetaminophen (NORCO) 5-325 mg per tablet Take 1 tablet by mouth every 6 (six) hours as needed for Pain.    hydrOXYzine HCL (ATARAX) 25 MG tablet Take 2 tablets (50 mg total) by mouth 2 (two) times daily as needed for Itching.    ibuprofen (ADVIL,MOTRIN) 800 MG tablet Take 1 tablet (800 mg total) by mouth 3 (three) times daily.    ipratropium (ATROVENT) 42 mcg (0.06 %) nasal spray 2 sprays by Each Nostril route 2 (two) times daily.    isosorbide mononitrate (IMDUR) 120 MG 24 hr tablet Take 1 tablet (120 mg total) by mouth every evening.    ketoconazole (NIZORAL) 2 % cream Apply topically 2 (two) times daily. For dry flaky patches of ear.    lancets Misc To check BG 1 times daily, to use with insurance preferred meter    latanoprost 0.005 % ophthalmic solution Place 1 drop into both eyes every evening.    levETIRAcetam (KEPPRA) 1000 MG tablet Take 1 tablet (1,000 mg total) by mouth 2 (two) times daily.    linezolid (ZYVOX) 600 mg Tab Take 1 tablet (600 mg total) by mouth every 12 (twelve) hours.    magnesium oxide (MAG-OX) 400 mg  (241.3 mg magnesium) tablet Take 1 tablet (400 mg total) by mouth once daily.    montelukast (SINGULAIR) 10 mg tablet Take 1 tablet (10 mg total) by mouth once daily.    montelukast (SINGULAIR) 10 mg tablet Take 1 tablet (10 mg total) by mouth once daily.    mucus clearing device (AEROBIKA OSCILLATING PEP SYSTM) by Misc.(Non-Drug; Combo Route) route 4 (four) times daily.    MYRBETRIQ 50 mg Tb24 TAKE 1 TABLET BY MOUTH ONCE DAILY (Patient not taking: Reported on 5/8/2024)    nitroGLYCERIN (NITROSTAT) 0.4 MG SL tablet Place 1 tablet (0.4 mg total) under the tongue every 5 (five) minutes as needed for Chest pain.    omeprazole (PRILOSEC) 40 MG capsule Take 1 capsule (40 mg total) by mouth every morning.    ondansetron (ZOFRAN-ODT) 4 MG TbDL Take 1 tablet (4 mg total) by mouth every 6 (six) hours as needed.    pantoprazole (PROTONIX) 40 MG tablet TAKE 1 TABLET BY MOUTH TWICE DAILY    potassium chloride SA (K-DUR,KLOR-CON) 20 MEQ tablet Take 1 tablet (20 mEq total) by mouth once daily. Take extra dose with Lasix - fluid pill    pramoxine-hydrocortisone (ANALPRAM HC) cream Apply topically 3 (three) times daily.    predniSONE (DELTASONE) 10 MG tablet Take 1 tablet (10 mg total) by mouth once daily.    predniSONE (DELTASONE) 10 MG tablet Take 40 mg by mouth.    ranolazine (RANEXA) 1,000 mg Tb12 Take 1 tablet (1,000 mg total) by mouth 2 (two) times daily.    roflumilast (DALIRESP) 500 mcg Tab TAKE ONE TABLET BY MOUTH DAILY    sacubitriL-valsartan (ENTRESTO) 24-26 mg per tablet Take 1 tablet by mouth 2 (two) times daily.    sodium chloride 3% 3 % nebulizer solution Take 4 mLs by nebulization as needed for Cough (chest congestion).    spironolactone (ALDACTONE) 50 MG tablet Take 1 tablet (50 mg total) by mouth once daily.    tezepelumab-ekko 210 mg/1.91 mL (110 mg/mL) PnIj Inject 210 mg into the skin every 28 days.    tiZANidine (ZANAFLEX) 4 MG tablet TAKE 1 TABLET BY MOUTH EVERY TWELVE HOURS AS NEEDED    triamcinolone  acetonide 0.025% (KENALOG) 0.025 % cream Apply topically 2 (two) times daily. PRN rash and itching of ear. Mild steroid cream.     Family History       Problem Relation (Age of Onset)    Breast cancer Maternal Aunt, Maternal Aunt, Maternal Aunt    No Known Problems Mother, Father          Tobacco Use    Smoking status: Former     Current packs/day: 0.00     Average packs/day: 1 pack/day for 42.3 years (42.3 ttl pk-yrs)     Types: Cigarettes     Start date:      Quit date: 2004     Years since quittin.7    Smokeless tobacco: Former   Substance and Sexual Activity    Alcohol use: No    Drug use: Never    Sexual activity: Not Currently     Partners: Male     Review of Systems   Constitutional:  Positive for activity change.   HENT:  Positive for congestion, postnasal drip and sinus pressure. Negative for trouble swallowing.    Eyes:  Negative for visual disturbance.   Respiratory:  Positive for cough, shortness of breath and wheezing. Negative for chest tightness.    Cardiovascular:  Negative for chest pain and leg swelling.   Gastrointestinal:  Negative for abdominal pain, constipation, diarrhea, nausea and vomiting.   Genitourinary:  Negative for difficulty urinating.   Musculoskeletal:  Negative for arthralgias, gait problem, joint swelling, myalgias and neck stiffness.   Skin:  Negative for color change.   Neurological:  Positive for weakness. Negative for seizures, syncope, facial asymmetry, speech difficulty, light-headedness, numbness and headaches.   Psychiatric/Behavioral:  Negative for agitation, behavioral problems and confusion.      Objective:     Vital Signs (Most Recent):  Temp: 98 °F (36.7 °C) (24 1146)  Pulse: 79 (24 1358)  Resp: (!) 30 (24 1358)  BP: (!) 108/57 (24 1146)  SpO2: 100 % (24 1358) Vital Signs (24h Range):  Temp:  [98 °F (36.7 °C)] 98 °F (36.7 °C)  Pulse:  [79-89] 79  Resp:  [16-36] 30  SpO2:  [94 %-100 %] 100 %  BP: (108-125)/(57-60) 108/57      Weight: 49.2 kg (108 lb 6.4 oz)  Body mass index is 18.61 kg/m².     Physical Exam  Vitals reviewed.   Constitutional:       General: She is not in acute distress.  HENT:      Head: Normocephalic.      Mouth/Throat:      Mouth: Mucous membranes are moist.   Cardiovascular:      Rate and Rhythm: Normal rate.      Pulses: Normal pulses.   Pulmonary:      Breath sounds: Wheezing present.   Chest:      Chest wall: Tenderness present.   Abdominal:      General: Bowel sounds are normal. There is no distension.      Palpations: Abdomen is soft.      Tenderness: There is no abdominal tenderness.   Genitourinary:     Comments: deferred  Musculoskeletal:      Cervical back: Neck supple.   Neurological:      Mental Status: She is alert.                Significant Labs: All pertinent labs within the past 24 hours have been reviewed.  CBC:   Recent Labs   Lab 12/31/24  1328   WBC 10.06   HGB 11.9*   HCT 37.0        CMP:   Recent Labs   Lab 12/31/24  1328      K 3.1*      CO2 16*   *   BUN 34*   CREATININE 1.5*   CALCIUM 8.3*   PROT 6.8   ALBUMIN 2.9*   BILITOT 0.3   ALKPHOS 45   AST 12   ALT 10   ANIONGAP 14     Magnesium:   Recent Labs   Lab 12/31/24  1328   MG <0.7*     Troponin:   Recent Labs   Lab 12/31/24  1328   TROPONINI <0.006     Urine Studies:   Recent Labs   Lab 12/31/24  1520   COLORU Yellow   APPEARANCEUA Clear   PHUR 6.0   SPECGRAV 1.020   PROTEINUA 1+*   GLUCUA 1+*   KETONESU Negative   BILIRUBINUA Negative   OCCULTUA Negative   NITRITE Negative   UROBILINOGEN Negative   LEUKOCYTESUR 2+*   RBCUA 0   WBCUA 6*   BACTERIA Rare   HYALINECASTS 1       Significant Imaging:   Imaging Results              CT Chest Without Contrast (Final result)  Result time 12/31/24 13:59:22      Final result by Tommy Maravilla MD (Timothy) (12/31/24 13:59:22)                   Impression:      Stable suspected pulmonary fibrosis of the lungs.  No acute infiltrates.      Electronically signed by: Tommy  MD Taiwo  Date:    12/31/2024  Time:    13:59               Narrative:    EXAMINATION:  CT CHEST WITHOUT CONTRAST    CLINICAL HISTORY:  Cough, persistent;    TECHNIQUE:  Standard noncontrast CT of the chest.  All CT scans at this facility use dose modulation, iterative reconstruction and/or weight based dosing when appropriate to reduce radiation dose to as low as reasonably achievable.    COMPARISON:  CT scan chest, 11/21/2024    FINDINGS:  Heart is normal in size.  Coronary artery calcifications are present.  Atherosclerosis of thoracic aorta.  No evidence of mediastinal hilar adenopathy.  No pericardial effusions.    Stable interstitial lung changes at the lung bases likely related to honeycombing and fibrosis.  No consolidation.  No acute infiltrates.  Airways appear unremarkable.    No adenopathy is identified.    Stable chronic compression deformities of the thoracic spine.                                     Assessment/Plan:     * COVID-19  Patient is identified as Severe COVID-19 based on hypoxemia with O2 saturations <94% on room air or on ambulation   Initiate standard COVID protocols; COVID-19 testing ,Infection Control notification  and isolation- respiratory, contact and droplet per protocol    Diagnostics: CBC, CMP, Ferritin, CRP, Troponin, and Portable CXR    Management: Initiate targeted therapy with Remdesivir, 200mg IV x1, followed by 100mg IV daily x5 days total and Anticoagulation, Patient admitted to non-critical care unit- Will initiate full dose anticoagulation with Weight based lovenox 1mg/kg IV q12, Maintain oxygen saturations 92-96% via Nasal Cannula  LPM and monitor with continuous/intermittent pulse oximetry. , Inhaled bronchodilators as needed for shortness of breath., and Continuous cardiac monitoring.    Advance Care Planning Current advance care plan has been discussed with patient/family/POA and patient currently wishes Full Code.     Hypokalemia  Patient's most recent potassium  results are listed below.   Recent Labs     12/31/24  1328   K 3.1*     Plan  - Replete potassium per protocol  - Monitor potassium Daily  - Patient's hypokalemia is  being treated. Received KCL 40 mEq PO x 1 in ED    CKD (chronic kidney disease)  Creatine stable for now. BMP reviewed- noted Estimated Creatinine Clearance: 23.2 mL/min (A) (based on SCr of 1.5 mg/dL (H)). according to latest data. Based on current GFR, CKD stage is stage 4 - GFR 15-29.  Monitor UOP and serial BMP and adjust therapy as needed. Renally dose meds. Avoid nephrotoxic medications and procedures.  -Creatinine 1.5 however 3 months ago was 1.2  -Due to GFR, will need to dc ranexa for now and decrease dose of gabapentin  -Hold spironolactone   -Continue Entresto   -Monitor creatinine    Chronic diastolic heart failure  Patient has Diastolic (HFpEF) heart failure that is Chronic. On presentation their CHF was well compensated. Most recent BNP and echo results are listed below.  Recent Labs     12/31/24  1328   BNP 92     Latest ECHO  Results for orders placed during the hospital encounter of 06/02/23    Echo    Interpretation Summary  · The left ventricle is normal in size with concentric hypertrophy and low normal systolic function.  · The estimated ejection fraction is 50%.  · Normal left ventricular diastolic function.  · There is abnormal septal wall motion consistent with left bundle branch block.  · Normal right ventricular size with normal right ventricular systolic function.  · Normal central venous pressure (3 mmHg).  · The estimated PA systolic pressure is 30 mmHg.  · Mild mitral regurgitation.    Current Heart Failure Medications  spironolactone tablet 50 mg, Daily, Oral    Plan  - Monitor strict I&Os and daily weights.    - Place on telemetry  - Low sodium diet  - Place on fluid restriction of 1.5 L.   - Cardiology has not been consulted  - The patient's volume status is at their baseline           Mixed hyperlipidemia  Lab Results    Component Value Date    CHOL 112 (L) 07/12/2024    HDL 41 07/12/2024    LDLCALC 49.8 (L) 07/12/2024    TRIG 106 07/12/2024     -Continue Statin    Normocytic anemia  Anemia is likely due to chronic disease due to Chronic Kidney Disease. Most recent hemoglobin and hematocrit are listed below.  Recent Labs     12/31/24  1328   HGB 11.9*   HCT 37.0     Plan  - Monitor serial CBC: Daily  - Transfuse PRBC if patient becomes hemodynamically unstable, symptomatic or H/H drops below 7/21.  - Patient has not received any PRBC transfusions to date  - Patient's anemia is currently stable    Seizure  -Continue Keppra  -Seizure precautions      Coronary artery disease of native artery of native heart with stable angina pectoris  Patient with known CAD, which is controlled Will continue Plavix and Statin and monitor for S/Sx of angina/ACS. Continue to monitor on telemetry.     GERD (gastroesophageal reflux disease)  -Continue PPI        VTE Risk Mitigation (From admission, onward)           Ordered     enoxaparin injection 50 mg  2 times daily         12/31/24 1518                         On 12/31/2024, patient should be placed in hospital observation services under Dr. Leal's care in collaboration with Octavia Noble NP.           GALILEO Rdz  Department of Hospital Medicine  [facility] - Emergency Dept.

## 2025-01-01 LAB
ANION GAP SERPL CALC-SCNC: 9 MMOL/L (ref 8–16)
BASOPHILS # BLD AUTO: 0.01 K/UL (ref 0–0.2)
BASOPHILS NFR BLD: 0.2 % (ref 0–1.9)
BUN SERPL-MCNC: 29 MG/DL (ref 8–23)
CALCIUM SERPL-MCNC: 7.7 MG/DL (ref 8.7–10.5)
CHLORIDE SERPL-SCNC: 115 MMOL/L (ref 95–110)
CO2 SERPL-SCNC: 16 MMOL/L (ref 23–29)
CREAT SERPL-MCNC: 1.1 MG/DL (ref 0.5–1.4)
DIFFERENTIAL METHOD BLD: ABNORMAL
EOSINOPHIL # BLD AUTO: 0 K/UL (ref 0–0.5)
EOSINOPHIL NFR BLD: 0 % (ref 0–8)
ERYTHROCYTE [DISTWIDTH] IN BLOOD BY AUTOMATED COUNT: 14.3 % (ref 11.5–14.5)
EST. GFR  (NO RACE VARIABLE): 51 ML/MIN/1.73 M^2
GLUCOSE SERPL-MCNC: 153 MG/DL (ref 70–110)
HCT VFR BLD AUTO: 35.3 % (ref 37–48.5)
HGB BLD-MCNC: 11.1 G/DL (ref 12–16)
IMM GRANULOCYTES # BLD AUTO: 0.16 K/UL (ref 0–0.04)
IMM GRANULOCYTES NFR BLD AUTO: 2.6 % (ref 0–0.5)
LYMPHOCYTES # BLD AUTO: 0.5 K/UL (ref 1–4.8)
LYMPHOCYTES NFR BLD: 8.3 % (ref 18–48)
MAGNESIUM SERPL-MCNC: 1.3 MG/DL (ref 1.6–2.6)
MCH RBC QN AUTO: 30.6 PG (ref 27–31)
MCHC RBC AUTO-ENTMCNC: 31.4 G/DL (ref 32–36)
MCV RBC AUTO: 97 FL (ref 82–98)
MONOCYTES # BLD AUTO: 0.1 K/UL (ref 0.3–1)
MONOCYTES NFR BLD: 2.1 % (ref 4–15)
NEUTROPHILS # BLD AUTO: 5.4 K/UL (ref 1.8–7.7)
NEUTROPHILS NFR BLD: 86.8 % (ref 38–73)
NRBC BLD-RTO: 0 /100 WBC
PLATELET # BLD AUTO: 238 K/UL (ref 150–450)
PMV BLD AUTO: 9.4 FL (ref 9.2–12.9)
POTASSIUM SERPL-SCNC: 4 MMOL/L (ref 3.5–5.1)
RBC # BLD AUTO: 3.63 M/UL (ref 4–5.4)
SODIUM SERPL-SCNC: 140 MMOL/L (ref 136–145)
WBC # BLD AUTO: 6.18 K/UL (ref 3.9–12.7)

## 2025-01-01 PROCEDURE — 25000242 PHARM REV CODE 250 ALT 637 W/ HCPCS: Performed by: STUDENT IN AN ORGANIZED HEALTH CARE EDUCATION/TRAINING PROGRAM

## 2025-01-01 PROCEDURE — 36415 COLL VENOUS BLD VENIPUNCTURE: CPT | Mod: HCNC | Performed by: NURSE PRACTITIONER

## 2025-01-01 PROCEDURE — 94761 N-INVAS EAR/PLS OXIMETRY MLT: CPT | Mod: HCNC

## 2025-01-01 PROCEDURE — 99900035 HC TECH TIME PER 15 MIN (STAT): Mod: HCNC

## 2025-01-01 PROCEDURE — 25000003 PHARM REV CODE 250: Mod: HCNC | Performed by: NURSE PRACTITIONER

## 2025-01-01 PROCEDURE — 96372 THER/PROPH/DIAG INJ SC/IM: CPT | Performed by: NURSE PRACTITIONER

## 2025-01-01 PROCEDURE — 63600175 PHARM REV CODE 636 W HCPCS: Mod: HCNC | Performed by: NURSE PRACTITIONER

## 2025-01-01 PROCEDURE — 25000242 PHARM REV CODE 250 ALT 637 W/ HCPCS: Mod: HCNC | Performed by: NURSE PRACTITIONER

## 2025-01-01 PROCEDURE — 21400001 HC TELEMETRY ROOM

## 2025-01-01 PROCEDURE — 25000003 PHARM REV CODE 250: Mod: HCNC | Performed by: STUDENT IN AN ORGANIZED HEALTH CARE EDUCATION/TRAINING PROGRAM

## 2025-01-01 PROCEDURE — 27000207 HC ISOLATION

## 2025-01-01 PROCEDURE — 63600175 PHARM REV CODE 636 W HCPCS: Mod: HCNC | Performed by: STUDENT IN AN ORGANIZED HEALTH CARE EDUCATION/TRAINING PROGRAM

## 2025-01-01 PROCEDURE — 27000221 HC OXYGEN, UP TO 24 HOURS: Mod: HCNC

## 2025-01-01 PROCEDURE — 85025 COMPLETE CBC W/AUTO DIFF WBC: CPT | Mod: HCNC | Performed by: NURSE PRACTITIONER

## 2025-01-01 PROCEDURE — 80048 BASIC METABOLIC PNL TOTAL CA: CPT | Mod: HCNC | Performed by: NURSE PRACTITIONER

## 2025-01-01 PROCEDURE — 63600175 PHARM REV CODE 636 W HCPCS: Performed by: NURSE PRACTITIONER

## 2025-01-01 PROCEDURE — 83735 ASSAY OF MAGNESIUM: CPT | Mod: HCNC | Performed by: NURSE PRACTITIONER

## 2025-01-01 PROCEDURE — 94640 AIRWAY INHALATION TREATMENT: CPT | Mod: HCNC,XB

## 2025-01-01 RX ORDER — ALBUTEROL SULFATE 90 UG/1
2 INHALANT RESPIRATORY (INHALATION)
Status: DISCONTINUED | OUTPATIENT
Start: 2025-01-01 | End: 2025-01-01

## 2025-01-01 RX ORDER — CALCIUM GLUCONATE 20 MG/ML
1 INJECTION, SOLUTION INTRAVENOUS ONCE
Status: COMPLETED | OUTPATIENT
Start: 2025-01-01 | End: 2025-01-01

## 2025-01-01 RX ORDER — MAGNESIUM SULFATE HEPTAHYDRATE 40 MG/ML
2 INJECTION, SOLUTION INTRAVENOUS
Status: COMPLETED | OUTPATIENT
Start: 2025-01-01 | End: 2025-01-01

## 2025-01-01 RX ORDER — HYDROXYZINE HYDROCHLORIDE 25 MG/1
25 TABLET, FILM COATED ORAL 3 TIMES DAILY PRN
Status: DISCONTINUED | OUTPATIENT
Start: 2025-01-01 | End: 2025-01-07

## 2025-01-01 RX ORDER — SODIUM BICARBONATE 650 MG/1
650 TABLET ORAL 2 TIMES DAILY
Status: DISCONTINUED | OUTPATIENT
Start: 2025-01-01 | End: 2025-01-01

## 2025-01-01 RX ORDER — TALC
6 POWDER (GRAM) TOPICAL NIGHTLY PRN
Status: DISCONTINUED | OUTPATIENT
Start: 2025-01-01 | End: 2025-01-28 | Stop reason: HOSPADM

## 2025-01-01 RX ORDER — HYDROCODONE BITARTRATE AND ACETAMINOPHEN 5; 325 MG/1; MG/1
1 TABLET ORAL EVERY 6 HOURS PRN
Status: DISCONTINUED | OUTPATIENT
Start: 2025-01-01 | End: 2025-01-17

## 2025-01-01 RX ORDER — SODIUM BICARBONATE 650 MG/1
650 TABLET ORAL 3 TIMES DAILY
Status: DISCONTINUED | OUTPATIENT
Start: 2025-01-01 | End: 2025-01-03

## 2025-01-01 RX ADMIN — ROFLUMILAST 500 MCG: 500 TABLET ORAL at 08:01

## 2025-01-01 RX ADMIN — THERA TABS 1 TABLET: TAB at 08:01

## 2025-01-01 RX ADMIN — LEVETIRACETAM 500 MG: 500 TABLET, FILM COATED ORAL at 08:01

## 2025-01-01 RX ADMIN — MAGNESIUM SULFATE HEPTAHYDRATE 2 G: 40 INJECTION, SOLUTION INTRAVENOUS at 11:01

## 2025-01-01 RX ADMIN — ENOXAPARIN SODIUM 50 MG: 60 INJECTION SUBCUTANEOUS at 08:01

## 2025-01-01 RX ADMIN — GUAIFENESIN AND DEXTROMETHORPHAN 10 ML: 100; 10 SYRUP ORAL at 12:01

## 2025-01-01 RX ADMIN — METHYLPREDNISOLONE SODIUM SUCCINATE 60 MG: 40 INJECTION, POWDER, FOR SOLUTION INTRAMUSCULAR; INTRAVENOUS at 06:01

## 2025-01-01 RX ADMIN — METOPROLOL TARTRATE 50 MG: 50 TABLET, FILM COATED ORAL at 08:01

## 2025-01-01 RX ADMIN — PANTOPRAZOLE SODIUM 40 MG: 40 TABLET, DELAYED RELEASE ORAL at 08:01

## 2025-01-01 RX ADMIN — SACUBITRIL AND VALSARTAN 1 TABLET: 24; 26 TABLET, FILM COATED ORAL at 08:01

## 2025-01-01 RX ADMIN — HYDROCODONE BITARTRATE AND ACETAMINOPHEN 1 TABLET: 5; 325 TABLET ORAL at 09:01

## 2025-01-01 RX ADMIN — HYDROXYZINE HYDROCHLORIDE 25 MG: 25 TABLET ORAL at 09:01

## 2025-01-01 RX ADMIN — IPRATROPIUM BROMIDE AND ALBUTEROL 1 PUFF: 20; 100 SPRAY, METERED RESPIRATORY (INHALATION) at 08:01

## 2025-01-01 RX ADMIN — OXYCODONE HYDROCHLORIDE AND ACETAMINOPHEN 500 MG: 500 TABLET ORAL at 08:01

## 2025-01-01 RX ADMIN — ISOSORBIDE MONONITRATE 120 MG: 60 TABLET, EXTENDED RELEASE ORAL at 08:01

## 2025-01-01 RX ADMIN — METHYLPREDNISOLONE SODIUM SUCCINATE 60 MG: 40 INJECTION, POWDER, FOR SOLUTION INTRAMUSCULAR; INTRAVENOUS at 01:01

## 2025-01-01 RX ADMIN — GABAPENTIN 200 MG: 100 CAPSULE ORAL at 08:01

## 2025-01-01 RX ADMIN — ATORVASTATIN CALCIUM 40 MG: 40 TABLET, FILM COATED ORAL at 08:01

## 2025-01-01 RX ADMIN — GABAPENTIN 200 MG: 100 CAPSULE ORAL at 04:01

## 2025-01-01 RX ADMIN — FLUTICASONE FUROATE AND VILANTEROL TRIFENATATE 1 PUFF: 100; 25 POWDER RESPIRATORY (INHALATION) at 09:01

## 2025-01-01 RX ADMIN — SODIUM BICARBONATE 650 MG TABLET 650 MG: at 04:01

## 2025-01-01 RX ADMIN — ESCITALOPRAM OXALATE 20 MG: 10 TABLET, FILM COATED ORAL at 08:01

## 2025-01-01 RX ADMIN — CALCIUM GLUCONATE 1 G: 20 INJECTION, SOLUTION INTRAVENOUS at 12:01

## 2025-01-01 RX ADMIN — MONTELUKAST 10 MG: 10 TABLET, FILM COATED ORAL at 08:01

## 2025-01-01 RX ADMIN — GUAIFENESIN AND DEXTROMETHORPHAN 10 ML: 100; 10 SYRUP ORAL at 06:01

## 2025-01-01 RX ADMIN — MAGNESIUM SULFATE HEPTAHYDRATE 2 G: 40 INJECTION, SOLUTION INTRAVENOUS at 01:01

## 2025-01-01 RX ADMIN — CLOPIDOGREL BISULFATE 75 MG: 75 TABLET ORAL at 08:01

## 2025-01-01 RX ADMIN — GUAIFENESIN AND DEXTROMETHORPHAN 10 ML: 100; 10 SYRUP ORAL at 08:01

## 2025-01-01 RX ADMIN — REMDESIVIR 100 MG: 100 INJECTION, POWDER, LYOPHILIZED, FOR SOLUTION INTRAVENOUS at 09:01

## 2025-01-01 RX ADMIN — METHYLPREDNISOLONE SODIUM SUCCINATE 60 MG: 40 INJECTION, POWDER, FOR SOLUTION INTRAMUSCULAR; INTRAVENOUS at 08:01

## 2025-01-01 RX ADMIN — ALBUTEROL SULFATE 2 PUFF: 90 AEROSOL, METERED RESPIRATORY (INHALATION) at 12:01

## 2025-01-01 RX ADMIN — ALBUTEROL SULFATE 2 PUFF: 90 AEROSOL, METERED RESPIRATORY (INHALATION) at 08:01

## 2025-01-01 RX ADMIN — SODIUM BICARBONATE 650 MG TABLET 650 MG: at 08:01

## 2025-01-01 NOTE — PLAN OF CARE
AAOx4. SPO2 100% on 2 L NC. HR NSR and BP normotensive throughout the shift. Up to toilet once with measured UOP of 100 mL and 1 episode of diarrhea. External catheter in place per pt request due to inability to hold urine when feeling the need to pee some sometimes. No measured UOP in external catheter canister during this shift. POC reviewed with patient. Bed in lowest position, side rails up x 3, wheels locked, call light within reach, and alarms on and audible.

## 2025-01-02 LAB
ALBUMIN SERPL BCP-MCNC: 2.3 G/DL (ref 3.5–5.2)
ALP SERPL-CCNC: 45 U/L (ref 40–150)
ALT SERPL W/O P-5'-P-CCNC: 10 U/L (ref 10–44)
ANION GAP SERPL CALC-SCNC: 7 MMOL/L (ref 8–16)
AST SERPL-CCNC: 11 U/L (ref 10–40)
BASOPHILS # BLD AUTO: 0.01 K/UL (ref 0–0.2)
BASOPHILS NFR BLD: 0.1 % (ref 0–1.9)
BILIRUB SERPL-MCNC: 0.2 MG/DL (ref 0.1–1)
BUN SERPL-MCNC: 29 MG/DL (ref 8–23)
CALCIUM SERPL-MCNC: 8.2 MG/DL (ref 8.7–10.5)
CHLORIDE SERPL-SCNC: 111 MMOL/L (ref 95–110)
CO2 SERPL-SCNC: 16 MMOL/L (ref 23–29)
CREAT SERPL-MCNC: 0.9 MG/DL (ref 0.5–1.4)
D DIMER PPP IA.FEU-MCNC: 0.23 MG/L FEU
DIFFERENTIAL METHOD BLD: ABNORMAL
EOSINOPHIL # BLD AUTO: 0 K/UL (ref 0–0.5)
EOSINOPHIL NFR BLD: 0 % (ref 0–8)
ERYTHROCYTE [DISTWIDTH] IN BLOOD BY AUTOMATED COUNT: 14.3 % (ref 11.5–14.5)
EST. GFR  (NO RACE VARIABLE): >60 ML/MIN/1.73 M^2
FERRITIN SERPL-MCNC: 128 NG/ML (ref 20–300)
GLUCOSE SERPL-MCNC: 142 MG/DL (ref 70–110)
HCT VFR BLD AUTO: 33.7 % (ref 37–48.5)
HGB BLD-MCNC: 10.2 G/DL (ref 12–16)
IMM GRANULOCYTES # BLD AUTO: 0.16 K/UL (ref 0–0.04)
IMM GRANULOCYTES NFR BLD AUTO: 1.3 % (ref 0–0.5)
LDH SERPL L TO P-CCNC: 139 U/L (ref 110–260)
LYMPHOCYTES # BLD AUTO: 0.9 K/UL (ref 1–4.8)
LYMPHOCYTES NFR BLD: 6.8 % (ref 18–48)
MAGNESIUM SERPL-MCNC: 2.1 MG/DL (ref 1.6–2.6)
MCH RBC QN AUTO: 29.9 PG (ref 27–31)
MCHC RBC AUTO-ENTMCNC: 30.3 G/DL (ref 32–36)
MCV RBC AUTO: 99 FL (ref 82–98)
MONOCYTES # BLD AUTO: 0.8 K/UL (ref 0.3–1)
MONOCYTES NFR BLD: 6.1 % (ref 4–15)
NEUTROPHILS # BLD AUTO: 10.8 K/UL (ref 1.8–7.7)
NEUTROPHILS NFR BLD: 85.7 % (ref 38–73)
NRBC BLD-RTO: 0 /100 WBC
PHOSPHATE SERPL-MCNC: 3.2 MG/DL (ref 2.7–4.5)
PLATELET # BLD AUTO: 239 K/UL (ref 150–450)
PMV BLD AUTO: 9.2 FL (ref 9.2–12.9)
POCT GLUCOSE: 160 MG/DL (ref 70–110)
POTASSIUM SERPL-SCNC: 4.3 MMOL/L (ref 3.5–5.1)
PROT SERPL-MCNC: 5.4 G/DL (ref 6–8.4)
RBC # BLD AUTO: 3.41 M/UL (ref 4–5.4)
SODIUM SERPL-SCNC: 134 MMOL/L (ref 136–145)
WBC # BLD AUTO: 12.56 K/UL (ref 3.9–12.7)

## 2025-01-02 PROCEDURE — 82728 ASSAY OF FERRITIN: CPT | Performed by: NURSE PRACTITIONER

## 2025-01-02 PROCEDURE — 94640 AIRWAY INHALATION TREATMENT: CPT

## 2025-01-02 PROCEDURE — 85379 FIBRIN DEGRADATION QUANT: CPT | Performed by: NURSE PRACTITIONER

## 2025-01-02 PROCEDURE — 36415 COLL VENOUS BLD VENIPUNCTURE: CPT | Performed by: STUDENT IN AN ORGANIZED HEALTH CARE EDUCATION/TRAINING PROGRAM

## 2025-01-02 PROCEDURE — 80053 COMPREHEN METABOLIC PANEL: CPT | Performed by: STUDENT IN AN ORGANIZED HEALTH CARE EDUCATION/TRAINING PROGRAM

## 2025-01-02 PROCEDURE — 99900035 HC TECH TIME PER 15 MIN (STAT)

## 2025-01-02 PROCEDURE — 94761 N-INVAS EAR/PLS OXIMETRY MLT: CPT

## 2025-01-02 PROCEDURE — 21400001 HC TELEMETRY ROOM

## 2025-01-02 PROCEDURE — 83735 ASSAY OF MAGNESIUM: CPT | Performed by: STUDENT IN AN ORGANIZED HEALTH CARE EDUCATION/TRAINING PROGRAM

## 2025-01-02 PROCEDURE — 63600175 PHARM REV CODE 636 W HCPCS: Performed by: NURSE PRACTITIONER

## 2025-01-02 PROCEDURE — 25000003 PHARM REV CODE 250: Performed by: NURSE PRACTITIONER

## 2025-01-02 PROCEDURE — 25000242 PHARM REV CODE 250 ALT 637 W/ HCPCS: Performed by: NURSE PRACTITIONER

## 2025-01-02 PROCEDURE — 36415 COLL VENOUS BLD VENIPUNCTURE: CPT | Performed by: NURSE PRACTITIONER

## 2025-01-02 PROCEDURE — 83615 LACTATE (LD) (LDH) ENZYME: CPT | Performed by: NURSE PRACTITIONER

## 2025-01-02 PROCEDURE — 27000221 HC OXYGEN, UP TO 24 HOURS

## 2025-01-02 PROCEDURE — 84100 ASSAY OF PHOSPHORUS: CPT | Performed by: STUDENT IN AN ORGANIZED HEALTH CARE EDUCATION/TRAINING PROGRAM

## 2025-01-02 PROCEDURE — 27000207 HC ISOLATION

## 2025-01-02 PROCEDURE — 85025 COMPLETE CBC W/AUTO DIFF WBC: CPT | Performed by: STUDENT IN AN ORGANIZED HEALTH CARE EDUCATION/TRAINING PROGRAM

## 2025-01-02 PROCEDURE — 25000003 PHARM REV CODE 250: Performed by: STUDENT IN AN ORGANIZED HEALTH CARE EDUCATION/TRAINING PROGRAM

## 2025-01-02 RX ADMIN — ROFLUMILAST 500 MCG: 500 TABLET ORAL at 10:01

## 2025-01-02 RX ADMIN — SODIUM BICARBONATE 650 MG TABLET 650 MG: at 10:01

## 2025-01-02 RX ADMIN — SACUBITRIL AND VALSARTAN 1 TABLET: 24; 26 TABLET, FILM COATED ORAL at 10:01

## 2025-01-02 RX ADMIN — IPRATROPIUM BROMIDE AND ALBUTEROL 1 PUFF: 20; 100 SPRAY, METERED RESPIRATORY (INHALATION) at 01:01

## 2025-01-02 RX ADMIN — GUAIFENESIN AND DEXTROMETHORPHAN 10 ML: 100; 10 SYRUP ORAL at 11:01

## 2025-01-02 RX ADMIN — OXYCODONE HYDROCHLORIDE AND ACETAMINOPHEN 500 MG: 500 TABLET ORAL at 10:01

## 2025-01-02 RX ADMIN — HYDROXYZINE HYDROCHLORIDE 25 MG: 25 TABLET ORAL at 05:01

## 2025-01-02 RX ADMIN — ATORVASTATIN CALCIUM 40 MG: 40 TABLET, FILM COATED ORAL at 10:01

## 2025-01-02 RX ADMIN — METOPROLOL TARTRATE 50 MG: 50 TABLET, FILM COATED ORAL at 10:01

## 2025-01-02 RX ADMIN — METHYLPREDNISOLONE SODIUM SUCCINATE 60 MG: 40 INJECTION, POWDER, FOR SOLUTION INTRAMUSCULAR; INTRAVENOUS at 10:01

## 2025-01-02 RX ADMIN — GABAPENTIN 200 MG: 100 CAPSULE ORAL at 02:01

## 2025-01-02 RX ADMIN — CLOPIDOGREL BISULFATE 75 MG: 75 TABLET ORAL at 10:01

## 2025-01-02 RX ADMIN — OXYCODONE HYDROCHLORIDE AND ACETAMINOPHEN 500 MG: 500 TABLET ORAL at 08:01

## 2025-01-02 RX ADMIN — GABAPENTIN 200 MG: 100 CAPSULE ORAL at 08:01

## 2025-01-02 RX ADMIN — IPRATROPIUM BROMIDE AND ALBUTEROL 1 PUFF: 20; 100 SPRAY, METERED RESPIRATORY (INHALATION) at 07:01

## 2025-01-02 RX ADMIN — PANTOPRAZOLE SODIUM 40 MG: 40 TABLET, DELAYED RELEASE ORAL at 08:01

## 2025-01-02 RX ADMIN — FLUTICASONE FUROATE AND VILANTEROL TRIFENATATE 1 PUFF: 100; 25 POWDER RESPIRATORY (INHALATION) at 07:01

## 2025-01-02 RX ADMIN — METOPROLOL TARTRATE 50 MG: 50 TABLET, FILM COATED ORAL at 08:01

## 2025-01-02 RX ADMIN — METHYLPREDNISOLONE SODIUM SUCCINATE 60 MG: 40 INJECTION, POWDER, FOR SOLUTION INTRAMUSCULAR; INTRAVENOUS at 06:01

## 2025-01-02 RX ADMIN — METHYLPREDNISOLONE SODIUM SUCCINATE 60 MG: 40 INJECTION, POWDER, FOR SOLUTION INTRAMUSCULAR; INTRAVENOUS at 02:01

## 2025-01-02 RX ADMIN — GABAPENTIN 200 MG: 100 CAPSULE ORAL at 10:01

## 2025-01-02 RX ADMIN — THERA TABS 1 TABLET: TAB at 10:01

## 2025-01-02 RX ADMIN — SODIUM BICARBONATE 650 MG TABLET 650 MG: at 08:01

## 2025-01-02 RX ADMIN — SODIUM BICARBONATE 650 MG TABLET 650 MG: at 02:01

## 2025-01-02 RX ADMIN — PANTOPRAZOLE SODIUM 40 MG: 40 TABLET, DELAYED RELEASE ORAL at 10:01

## 2025-01-02 RX ADMIN — ENOXAPARIN SODIUM 50 MG: 60 INJECTION SUBCUTANEOUS at 08:01

## 2025-01-02 RX ADMIN — ENOXAPARIN SODIUM 50 MG: 60 INJECTION SUBCUTANEOUS at 10:01

## 2025-01-02 RX ADMIN — GUAIFENESIN AND DEXTROMETHORPHAN 10 ML: 100; 10 SYRUP ORAL at 06:01

## 2025-01-02 RX ADMIN — HYDROXYZINE HYDROCHLORIDE 25 MG: 25 TABLET ORAL at 10:01

## 2025-01-02 RX ADMIN — ESCITALOPRAM OXALATE 20 MG: 10 TABLET, FILM COATED ORAL at 10:01

## 2025-01-02 RX ADMIN — LEVETIRACETAM 500 MG: 500 TABLET, FILM COATED ORAL at 08:01

## 2025-01-02 RX ADMIN — REMDESIVIR 100 MG: 100 INJECTION, POWDER, LYOPHILIZED, FOR SOLUTION INTRAVENOUS at 12:01

## 2025-01-02 RX ADMIN — ISOSORBIDE MONONITRATE 120 MG: 60 TABLET, EXTENDED RELEASE ORAL at 08:01

## 2025-01-02 RX ADMIN — LEVETIRACETAM 500 MG: 500 TABLET, FILM COATED ORAL at 10:01

## 2025-01-02 RX ADMIN — GUAIFENESIN AND DEXTROMETHORPHAN 10 ML: 100; 10 SYRUP ORAL at 05:01

## 2025-01-02 RX ADMIN — SACUBITRIL AND VALSARTAN 1 TABLET: 24; 26 TABLET, FILM COATED ORAL at 08:01

## 2025-01-02 RX ADMIN — MONTELUKAST 10 MG: 10 TABLET, FILM COATED ORAL at 10:01

## 2025-01-02 NOTE — HOSPITAL COURSE
Admitted to Hospital Medicine for acute on chronic respiratory failure concerns in setting of COVID-19.  Started on IV methylprednisolone, remdesivir, scheduled respiratory treatments. 01/04: Reported left lower quadrant abdominal pain with tenderness to palpation, will evaluate via CT as patient is on therapeutic anticoagulation for COVID-19.  CT abdomen/pelvis nonacute. 01/05:  Reported improvements in respiratory status with decreased frequency of cough and dyspnea but still reporting significant symptoms from baseline.  On 1/6, patient had a significant event with acute tachypnea, tachycardia. Pulmonology consulted, orders adjusted. Patient stable overnight, had another event on 1/7 similar to previous day. Possible exacerbation related to anxiety provoked by medications and congestion. Case reviewed, orders adjusted. Per Pulmonology, due to underlying lung fibrosis, patient is likely not benefiting from bronchodilators. Stopped on 1/7, patient did not experience any further episodes of anxiety, respiratory distress. Will continue xanax and prn morphine. Patient with epistaxis, small clots in tissue noted. Reduced supplemental O2 to 1L at rest, stopped full dose lovenox and placed on ppx dose. Hgb stable.   Difficulty with discharge plan, patient originally planned to d/c to SNF, now prefers to d/c home.  Pulmonology discussed home palliative care vs hospice.  Patient and family wish to discuss the Services further.  Per discussion with patient, family will be available for an informational visit on Monday.  Educated patient on results of oxygen evaluation, advised patient to reduce oxygen to 2 L or less at rest, requires 4 L with exertion.  As clinically indicated if significant improvement noted will repeat oxygen evaluation.     01/12/2025  O2 sats stable on 2L (while at rest). Patient and family wants to try and go home, but unable to participate with PT over weekend. Repeat desat study on Monday. May have  to consider palliative measures if progress plateaus.      01/13/2025  Feeling slightly better today than she felt a few days ago but overall still significantly short of breath with exertion.  Discussed with her, palliative Care, and her family.  She does not want to change to hospice at this time.  She wants to try to get better.  She does want to be DNR though.  Appreciate further palliative care involvement.  Still desaturating significantly with exertion and unable to work with PT because of this    01/14/2025  Feeling perhaps slightly better today, attempting to work with physical therapy.  We are hoping that we could either get her to rehab if her oxygen requirements with exertion go down, or potentially get a bigger concentrator at home for increasing her up to 10 L with exertion at home.  Otherwise no changes to the plan    01/15/2025:  Still having coughing spells but notably improved.  Oxygen is being down titrated and she even got a room air today.  We are screening her for rehabs    01/16/2025:  Coughing she states is much worse when oxygen is off.  She was on room air but I put her on 1 L to see if there is truly an effect.  She is still being screened for rehabs.  Overall she looks much better.  She did technically have a fever today to 101.4 but when I evaluate her she looks notably better, CXR is negative for acute change, would not recommend starting antibiotics and she has defervesced    1/17/25: Somnolent this AM, spiking more fevers yesterday evening, and mildly soft BP this AM. Lactate wnl. Alert and oriented on my eval. CXR with ? Infiltrate, more cough, feeling worse. Will start ABX for PNA, blood cultures, sputum culture    01/18/2025: Significant epistaxis and hemoptysis overnight.  CT with and without contrast did not reveal a source of bleed.  Still coughing with hemoptysis this morning.  On exam with obvious epistaxis, packed by ENT, resolved.  Coughing has improved.  Currently on  Ventimask to avoid nasal cannula     01/19/2025: Doing better today, no more hemoptysis, less cough, no more epistaxis, hemoglobin up slightly.  Still on Ventimask to avoid nasal cannula but only requiring minimal quantities of oxygen, can take it off to eat.  Listed precautions yesterday evening for COVID given has been more than 10 days since diagnosis    1/20/25: reporting abdominal pain when coughing. States she reported yesterday as well.     1/21/25 patient reports she is feeling better. Abdominal pain with movement. Audible wheezing noted. Duonebs added    1/22/25: adjusted medications for cough. Awaiting placement    1/23/25: patient with persistent cough. Chest x-ray stable. Changed Mucinex to Mucinex DM. Continue steroids, slow taper    1/24/25: patient asking about home oxygen. Currently using venti mask for comfort. Home oxygen eval ordered again. Awaiting placement.    1/25/25: patient no longer requires oxygen. Cough is improving. Awaiting placement    1/26/25: Patient feeling better, cough present but better than earlier in the week. Awaiting placement stable on room air.     1/27/25:  Continued to report improvement in respiratory symptoms, some concerns for recurrence of epistaxis.  Will discuss with ENT    1/28/25:  No further recurrence of isolated episode of epistaxis since yesterday.  Discussed with ENT, given self-resolution and stability in hemoglobin, recommendation for continued management with Afrin p.r.n. and saline nasal spray use.  Counseled on outpatient follow up with ENT.     Discharge plan of care was discussed at length with patient and daughter at bedside including patient's need for close outpatient follow-up. Outpatient follow-ups were scheduled, or if unable to be scheduled ambulatory referrals were placed. Instructed on PCP followup, pulmonology referral followup, medication compliance and potential side effects/adverse events of medications.  Counseled on prednisone taper  dosing and strategies on preventing recurrence of epistaxis.  Given borderline blood pressures, held off on resuming home isosorbide, spironolactone with instructions provided to follow up with PCP within 1 week for evaluation of resumption.  Instructed on PCP follow up within 1 week with repeat lab work to ensure normalization, follow up of pending labs, or any further evaluation as necessitated.. All questions and concerns were answered. Return precautions provided.  Patient instructed to return to the hospital or seek medical care if baseline status should suddenly change, return of symptoms occurs, or for any new or concerning symptoms that arise.  Patient was in agreement with plan of care going forward. Patient continued to remain afebrile, hemodynamically stable without supplemental oxygen, able to tolerate diet, and ambulate without any significant concerns.  Patient has significant debility and was extensively counseled on consideration of PT/OT recommendations for SNF placement, however patient declined placement and preferred home health instead. PARTHA/[facility]A set up home health.  Patient seen and examined on the day of discharge. Patient deemed stable for discharge.

## 2025-01-02 NOTE — ASSESSMENT & PLAN NOTE
Patient's most recent potassium results are listed below.   Recent Labs     12/31/24  1328 01/01/25  0505   K 3.1* 4.0       Plan  - Replete potassium per protocol  - Monitor potassium Daily  - Patient's hypokalemia is  being treated. Received KCL 40 mEq PO x 1 in ED

## 2025-01-02 NOTE — ASSESSMENT & PLAN NOTE
Patient has Diastolic (HFpEF) heart failure that is Chronic. On presentation their CHF was well compensated. Most recent BNP and echo results are listed below.  Recent Labs     12/31/24  1328   BNP 92       Latest ECHO  Results for orders placed during the hospital encounter of 06/02/23    Echo    Interpretation Summary  · The left ventricle is normal in size with concentric hypertrophy and low normal systolic function.  · The estimated ejection fraction is 50%.  · Normal left ventricular diastolic function.  · There is abnormal septal wall motion consistent with left bundle branch block.  · Normal right ventricular size with normal right ventricular systolic function.  · Normal central venous pressure (3 mmHg).  · The estimated PA systolic pressure is 30 mmHg.  · Mild mitral regurgitation.    Current Heart Failure Medications  sacubitriL-valsartan 24-26 mg per tablet 1 tablet, 2 times daily, Oral    Plan  - Monitor strict I&Os and daily weights.    - Place on telemetry  - Low sodium diet  - Place on fluid restriction of 1.5 L.   - Cardiology has not been consulted  - The patient's volume status is at their baseline

## 2025-01-02 NOTE — ASSESSMENT & PLAN NOTE
An order for Zofran has been discontinued for this patient due to the risk of QT prolongation. If an antiemetic is indicated for your patient, please consider use of Tigan or Compazine. Current QTc = 623   Please contact the Pharmacy with any questions.   [address]  2/21/2023  2:08 AM Lab Results   Component Value Date    CHOL 112 (L) 07/12/2024    HDL 41 07/12/2024    LDLCALC 49.8 (L) 07/12/2024    TRIG 106 07/12/2024     -Continue Statin

## 2025-01-02 NOTE — PROGRESS NOTES
[facility] ER Medicine  Progress Note    Patient Name: Shireen Felix  MRN: [id number]  Patient Class: OP- Observation   Admission Date: 12/31/2024  Length of Stay: 0 days  Attending Physician: Jalen Leal DO  Primary Care Provider: Laron Chaudhari MD        Subjective     Principal Problem:COVID-19        HPI:  Shireen Felix is a 79 year old female who has  has a past medical history of Abnormal ECG, Acute bronchitis and bronchiolitis, Aneurysm, Anticoagulant long-term use, Arthritis, CAD in native artery, COPD (chronic obstructive pulmonary disease), Diabetes mellitus, Fall, Glaucoma, Hypertension, Seizures, Shingles, and Stroke who presented to Emergency Department for evaluation for cough. Associated symptoms include: congestion, wheezing, and SOB. She was seen by her pulmonologist today, Dr. Sam. Started as sinus pain and drainage and now has progressed to respiratory symptoms. She was seen a few days ago in the ER given prescription for antibiotics steroids but these have not helped much. Today in the office she can barely talk without coughing continually. She is having a difficult time putting more than 3 words together without severe cough paroxysms. ED workup showed Hgb/Hct 11.9/37.0, K+ 3.1, CO2 16, BUN/creatinine 34/1.5. CT chest w/out contrast stable suspected pulmonary fibrosis of the lungs. No acute infiltrates. She has received DuoNeb x 1, solumedrol 125 mg IV x 1, KCL 40 mEq PO x 1 and NS 1L bolus. Pt admitted for COVID.     Overview/Hospital Course:  Admitted to Hospital Medicine for acute on chronic respiratory failure concerns in setting of COVID-19.  Started on IV methylprednisolone, remdesivir, scheduled respiratory treatments.    Interval History:  Reports mild headache.  Has a history of aneurysm repair, so will plan for CT head.  Continues to report significant dyspnea, cough.    Objective:     Vital Signs (Most Recent):  Temp: 98.1 °F  (36.7 °C) (01/01/25 2016)  Pulse: 78 (01/01/25 2051)  Resp: 18 (01/01/25 2051)  BP: 121/60 (01/01/25 2016)  SpO2: 100 % (01/01/25 2016) Vital Signs (24h Range):  Temp:  [96.1 °F (35.6 °C)-98.1 °F (36.7 °C)] 98.1 °F (36.7 °C)  Pulse:  [61-88] 78  Resp:  [15-32] 18  SpO2:  [98 %-100 %] 100 %  BP: (121-147)/(60-84) 121/60     Weight: 51.3 kg (113 lb 1.5 oz)  Body mass index is 19.41 kg/m².    Intake/Output Summary (Last 24 hours) at 1/1/2025 2057  Last data filed at 1/1/2025 0000  Gross per 24 hour   Intake --   Output 100 ml   Net -100 ml         Physical Exam  Vitals and nursing note reviewed.   Constitutional:       General: She is not in acute distress.     Appearance: She is not ill-appearing, toxic-appearing or diaphoretic.   HENT:      Head: Normocephalic and atraumatic.      Mouth/Throat:      Mouth: Mucous membranes are moist.   Eyes:      General: No scleral icterus.        Right eye: No discharge.         Left eye: No discharge.   Cardiovascular:      Rate and Rhythm: Normal rate and regular rhythm.      Heart sounds: Normal heart sounds.   Pulmonary:      Effort: Pulmonary effort is normal. No respiratory distress.      Breath sounds: Decreased breath sounds, wheezing and rhonchi present.   Abdominal:      General: Bowel sounds are normal.      Palpations: Abdomen is soft.      Tenderness: There is no abdominal tenderness.   Musculoskeletal:      Cervical back: No rigidity.      Right lower leg: No edema.      Left lower leg: No edema.   Skin:     General: Skin is warm and dry.      Coloration: Skin is not jaundiced.   Neurological:      Mental Status: She is alert and oriented to person, place, and time. Mental status is at baseline.   Psychiatric:         Mood and Affect: Mood normal.         Behavior: Behavior normal.             Significant Labs: All pertinent labs within the past 24 hours have been reviewed.   Recent Labs   Lab 12/31/24  1328 01/01/25  0505    140   K 3.1* 4.0    115*   CO2  "16* 16*   BUN 34* 29*   CREATININE 1.5* 1.1   * 153*   ANIONGAP 14 9     Recent Labs   Lab 12/31/24  1328 01/01/25  0505   MG <0.7* 1.3*     Recent Labs   Lab 12/31/24  1328   AST 12   ALT 10   ALKPHOS 45   BILITOT 0.3   ALBUMIN 2.9*     POCT Glucose:   No results for input(s): "POCTGLUCOSE" in the last 168 hours. Recent Labs   Lab 12/31/24  1328 01/01/25  0331   WBC 10.06 6.18   HGB 11.9* 11.1*   HCT 37.0 35.3*    238   GRAN 64.9  6.5 86.8*  5.4        Microbiology  Blood Cultures  Lab Results   Component Value Date    LABBLOO No growth after 5 days. 08/20/2023    LABBLOO No growth after 5 days. 08/20/2023    LABBLOO No growth after 5 days. 08/13/2023    LABBLOO No growth after 5 days. 08/13/2023    LABBLOO No growth after 5 days. 12/23/2019     Urine Cultures  Lab Results   Component Value Date    LABURIN  08/13/2023     Multiple organisms isolated. None in predominance.  Repeat if    LABURIN clinically necessary. 08/13/2023    LABURIN No significant growth 12/14/2021         Significant Imaging:  I have reviewed all pertinent imaging results/findings within the past 24 hours.   CT Head Without Contrast   Final Result      No acute abnormality.      Prior aneurysm clipping.      All CT scans at this facility are performed  using dose modulation techniques as appropriate to performed exam including the following:  automated exposure control; adjustment of mA and/or kV according to the patients size (this includes techniques or standardized protocols for targeted exams where dose is matched to indication/reason for exam: i.e. extremities or head);  iterative reconstruction technique.         Electronically signed by: Shane Jones   Date:    01/01/2025   Time:    20:27      CT Chest Without Contrast   Final Result      Stable suspected pulmonary fibrosis of the lungs.  No acute infiltrates.         Electronically signed by: Tommy Maravilla MD   Date:    12/31/2024   Time:    13:59          Inpatient " Medications:  Continuous Infusions:    Scheduled Meds:   ascorbic acid (vitamin C)  500 mg Oral BID    atorvastatin  40 mg Oral Daily    clopidogreL  75 mg Oral Daily    dextromethorphan-guaiFENesin  mg/5 ml  10 mL Oral Q6H    enoxparin  1 mg/kg Subcutaneous BID    EScitalopram oxalate  20 mg Oral Daily    fluticasone furoate-vilanteroL  1 puff Inhalation Daily    gabapentin  200 mg Oral TID    ipratropium-albuteroL  1 puff Inhalation Q6H    isosorbide mononitrate  120 mg Oral QHS    levETIRAcetam  500 mg Oral BID    methylPREDNISolone injection (PEDS and ADULTS)  60 mg Intravenous Q8H    metoprolol tartrate  50 mg Oral BID    montelukast  10 mg Oral Daily    multivitamin  1 tablet Oral Daily    pantoprazole  40 mg Oral BID    remdesivir infusion  100 mg Intravenous Daily    roflumilast  1 tablet Oral Daily    sacubitriL-valsartan  1 tablet Oral BID    sodium bicarbonate  650 mg Oral TID       PRN Meds:  Current Facility-Administered Medications:     acetaminophen, 650 mg, Oral, Q6H PRN    dextrose 50%, 12.5 g, Intravenous, PRN    dextrose 50%, 25 g, Intravenous, PRN    glucagon (human recombinant), 1 mg, Intramuscular, PRN    glucose, 16 g, Oral, PRN    glucose, 24 g, Oral, PRN    ondansetron, 4 mg, Intravenous, Q8H PRN    sodium chloride 0.9%, 10 mL, Intravenous, PRN          Assessment and Plan     * COVID-19  Patient is identified as Severe COVID-19 based on hypoxemia with O2 saturations <94% on room air or on ambulation   Initiate standard COVID protocols; COVID-19 testing ,Infection Control notification  and isolation- respiratory, contact and droplet per protocol    Diagnostics: CBC, CMP, Ferritin, CRP, Troponin, and Portable CXR    Management: Initiate targeted therapy with Remdesivir, 200mg IV x1, followed by 100mg IV daily x5 days total and Anticoagulation, Patient admitted to non-critical care unit- Will initiate full dose anticoagulation with Weight based lovenox 1mg/kg IV q12, Maintain oxygen  saturations 92-96% via Nasal Cannula  LPM and monitor with continuous/intermittent pulse oximetry. , Inhaled bronchodilators as needed for shortness of breath., and Continuous cardiac monitoring.    Advance Care Planning Current advance care plan has been discussed with patient/family/POA and patient currently wishes Full Code.     Hypokalemia  Patient's most recent potassium results are listed below.   Recent Labs     12/31/24  1328 01/01/25  0505   K 3.1* 4.0       Plan  - Replete potassium per protocol  - Monitor potassium Daily  - Patient's hypokalemia is  being treated. Received KCL 40 mEq PO x 1 in ED    CKD (chronic kidney disease)  Creatine stable for now. BMP reviewed- noted Estimated Creatinine Clearance: 33 mL/min (based on SCr of 1.1 mg/dL). according to latest data. Based on current GFR, CKD stage is stage 4 - GFR 15-29.  Monitor UOP and serial BMP and adjust therapy as needed. Renally dose meds. Avoid nephrotoxic medications and procedures.  -Creatinine 1.5 however 3 months ago was 1.2  -Due to GFR, will need to dc ranexa for now and decrease dose of gabapentin  -Hold spironolactone   -Continue Entresto   -Monitor creatinine    Chronic diastolic heart failure  Patient has Diastolic (HFpEF) heart failure that is Chronic. On presentation their CHF was well compensated. Most recent BNP and echo results are listed below.  Recent Labs     12/31/24  1328   BNP 92       Latest ECHO  Results for orders placed during the hospital encounter of 06/02/23    Echo    Interpretation Summary  · The left ventricle is normal in size with concentric hypertrophy and low normal systolic function.  · The estimated ejection fraction is 50%.  · Normal left ventricular diastolic function.  · There is abnormal septal wall motion consistent with left bundle branch block.  · Normal right ventricular size with normal right ventricular systolic function.  · Normal central venous pressure (3 mmHg).  · The estimated PA systolic  pressure is 30 mmHg.  · Mild mitral regurgitation.    Current Heart Failure Medications  sacubitriL-valsartan 24-26 mg per tablet 1 tablet, 2 times daily, Oral    Plan  - Monitor strict I&Os and daily weights.    - Place on telemetry  - Low sodium diet  - Place on fluid restriction of 1.5 L.   - Cardiology has not been consulted  - The patient's volume status is at their baseline           Mixed hyperlipidemia  Lab Results   Component Value Date    CHOL 112 (L) 07/12/2024    HDL 41 07/12/2024    LDLCALC 49.8 (L) 07/12/2024    TRIG 106 07/12/2024     -Continue Statin    Normocytic anemia  Anemia is likely due to chronic disease due to Chronic Kidney Disease. Most recent hemoglobin and hematocrit are listed below.  Recent Labs     12/31/24  1328 01/01/25  0331   HGB 11.9* 11.1*   HCT 37.0 35.3*       Plan  - Monitor serial CBC: Daily  - Transfuse PRBC if patient becomes hemodynamically unstable, symptomatic or H/H drops below 7/21.  - Patient has not received any PRBC transfusions to date  - Patient's anemia is currently stable    Seizure  -Continue Keppra  -Seizure precautions      Coronary artery disease of native artery of native heart with stable angina pectoris  Patient with known CAD, which is controlled Will continue Plavix and Statin and monitor for S/Sx of angina/ACS. Continue to monitor on telemetry.     GERD (gastroesophageal reflux disease)  -Continue PPI        VTE Risk Mitigation (From admission, onward)           Ordered     enoxaparin injection 50 mg  2 times daily         12/31/24 1518                    Discharge Planning   KATHERINE:      Code Status: Full Code   Medical Readiness for Discharge Date:                            Jalen Leal DO  Department of Hospital Medicine   [facility] Tele[facility] ([facility])    Voice recognition software was used in the creation of this note/communication and any sound-alike/typographical errors which may have occurred despite initial review prior to signing should be  taken in context when interpreting.  If such errors prevent a clear understanding of the note/communication, please contact the provider/office for clarification.

## 2025-01-02 NOTE — ASSESSMENT & PLAN NOTE
Anemia is likely due to chronic disease due to Chronic Kidney Disease. Most recent hemoglobin and hematocrit are listed below.  Recent Labs     12/31/24  1328 01/01/25  0331   HGB 11.9* 11.1*   HCT 37.0 35.3*       Plan  - Monitor serial CBC: Daily  - Transfuse PRBC if patient becomes hemodynamically unstable, symptomatic or H/H drops below 7/21.  - Patient has not received any PRBC transfusions to date  - Patient's anemia is currently stable

## 2025-01-02 NOTE — PLAN OF CARE
[facility] - Tele[facility] (Hospital)  Initial Discharge Assessment       Primary Care Provider: Laron Chaudhari MD    Admission Diagnosis: Acute respiratory insufficiency [R06.89]  Hypokalemia [E87.6]  Dyspnea [R06.00]  Acute exacerbation of chronic obstructive pulmonary disease (COPD) [J44.1]  Acute renal insufficiency [N28.9]  Lab test positive for detection of COVID-19 virus [U07.1]    Admission Date: 12/31/2024  Expected Discharge Date:     Transition of Care Barriers: (P) None    Payor: HUMANA MANAGED MEDICARE / Plan: HU[employer]NA SNP HMO PPO SPECIAL NEEDS / Product Type: Medicare Advantage /     Extended Emergency Contact Information  Primary Emergency Contact: RustyRm   [place]  Mobile Phone: [phone]  Relation: Brother  Secondary Emergency Contact: Nettie Goff  Mobile Phone: [phone]  Relation: Daughter    Discharge Plan A: (P) [facility]         [facility] [place], [place] - [address] [facility]  [address] [address] [address]  Phone: [phone] Fax: [phone]    [facility] - [facility] [address]  [address] [facility] [address]  Phone: [phone] Fax: [phone]      Initial Assessment (most recent)       Adult Discharge Assessment - 01/02/25 1610          Discharge Assessment    Assessment Type Discharge Planning Assessment (P)      Source of Information family (P)      When was your last doctors appointment? 12/31/24 (P)      Communicated KATHERINE with patient/caregiver Date not available/Unable to determine (P)      Reason For Admission covid (P)      People in Home alone (P)      Facility Arrived From: Dr. Briseno office sent to the ER (P)      Do you expect to return to your current living situation? Yes (P)      Do you have help at home or someone to help you manage your care at home? No (P)      Prior to hospitilization cognitive status: Alert/Oriented (P)      Current cognitive status: Alert/Oriented (P)       Walking or Climbing Stairs Difficulty yes (P)      Walking or Climbing Stairs ambulation difficulty, requires equipment (P)      Mobility Management uses rolling walker (P)      Dressing/Bathing Difficulty yes (P)      Dressing/Bathing bathing difficulty, assistance 1 person (P)      Dressing/Bathing Management needs assistance off and on depending on how she is feeling (P)      Home Accessibility other (see comments) (P)    partially w/c accessible; lives in a trailer    Home Layout Able to live on 1st floor (P)      Equipment Currently Used at Home walker, rolling;rollator;wheelchair;glucometer;nebulizer (P)      Readmission within 30 days? No (P)      Patient currently being followed by outpatient case management? No (P)      Do you currently have service(s) that help you manage your care at home? No (P)      Do you take prescription medications? Yes (P)      Do you have prescription coverage? Yes (P)      Coverage [employer] (P)      Do you have any problems affording any of your prescribed medications? No (P)      Who is going to help you get home at discharge? BrotherRm (P)      How do you get to doctors appointments? family or friend will provide (P)      Are you on dialysis? No (P)      Do you take coumadin? No (P)      Discharge Plan A Home Health (P)      DME Needed Upon Discharge  none (P)      Discharge Plan discussed with: Adult children (P)      Transition of Care Barriers None (P)                    Spoke to daughterNettie over the phone.  Patient lives alone.  Her 2 daughters check on her frequently.  She is moderately independent with ADL's with assistive equipment.  Sent request to MD to order PT/OT zain to see what patient physically needs to go home.

## 2025-01-02 NOTE — SUBJECTIVE & OBJECTIVE
Interval History:  Reports mild headache.  Has a history of aneurysm repair, so will plan for CT head.  Continues to report significant dyspnea, cough.    Objective:     Vital Signs (Most Recent):  Temp: 98.1 °F (36.7 °C) (01/01/25 2016)  Pulse: 78 (01/01/25 2051)  Resp: 18 (01/01/25 2051)  BP: 121/60 (01/01/25 2016)  SpO2: 100 % (01/01/25 2016) Vital Signs (24h Range):  Temp:  [96.1 °F (35.6 °C)-98.1 °F (36.7 °C)] 98.1 °F (36.7 °C)  Pulse:  [61-88] 78  Resp:  [15-32] 18  SpO2:  [98 %-100 %] 100 %  BP: (121-147)/(60-84) 121/60     Weight: 51.3 kg (113 lb 1.5 oz)  Body mass index is 19.41 kg/m².    Intake/Output Summary (Last 24 hours) at 1/1/2025 2057  Last data filed at 1/1/2025 0000  Gross per 24 hour   Intake --   Output 100 ml   Net -100 ml         Physical Exam  Vitals and nursing note reviewed.   Constitutional:       General: She is not in acute distress.     Appearance: She is not ill-appearing, toxic-appearing or diaphoretic.   HENT:      Head: Normocephalic and atraumatic.      Mouth/Throat:      Mouth: Mucous membranes are moist.   Eyes:      General: No scleral icterus.        Right eye: No discharge.         Left eye: No discharge.   Cardiovascular:      Rate and Rhythm: Normal rate and regular rhythm.      Heart sounds: Normal heart sounds.   Pulmonary:      Effort: Pulmonary effort is normal. No respiratory distress.      Breath sounds: Decreased breath sounds, wheezing and rhonchi present.   Abdominal:      General: Bowel sounds are normal.      Palpations: Abdomen is soft.      Tenderness: There is no abdominal tenderness.   Musculoskeletal:      Cervical back: No rigidity.      Right lower leg: No edema.      Left lower leg: No edema.   Skin:     General: Skin is warm and dry.      Coloration: Skin is not jaundiced.   Neurological:      Mental Status: She is alert and oriented to person, place, and time. Mental status is at baseline.   Psychiatric:         Mood and Affect: Mood normal.          "Behavior: Behavior normal.             Significant Labs: All pertinent labs within the past 24 hours have been reviewed.   Recent Labs   Lab 12/31/24  1328 01/01/25  0505    140   K 3.1* 4.0    115*   CO2 16* 16*   BUN 34* 29*   CREATININE 1.5* 1.1   * 153*   ANIONGAP 14 9     Recent Labs   Lab 12/31/24  1328 01/01/25  0505   MG <0.7* 1.3*     Recent Labs   Lab 12/31/24  1328   AST 12   ALT 10   ALKPHOS 45   BILITOT 0.3   ALBUMIN 2.9*     POCT Glucose:   No results for input(s): "POCTGLUCOSE" in the last 168 hours. Recent Labs   Lab 12/31/24  1328 01/01/25  0331   WBC 10.06 6.18   HGB 11.9* 11.1*   HCT 37.0 35.3*    238   GRAN 64.9  6.5 86.8*  5.4        Microbiology  Blood Cultures  Lab Results   Component Value Date    LABBLOO No growth after 5 days. 08/20/2023    LABBLOO No growth after 5 days. 08/20/2023    LABBLOO No growth after 5 days. 08/13/2023    LABBLOO No growth after 5 days. 08/13/2023    LABBLOO No growth after 5 days. 12/23/2019     Urine Cultures  Lab Results   Component Value Date    LABURIN  08/13/2023     Multiple organisms isolated. None in predominance.  Repeat if    LABURIN clinically necessary. 08/13/2023    LABURIN No significant growth 12/14/2021         Significant Imaging:  I have reviewed all pertinent imaging results/findings within the past 24 hours.   CT Head Without Contrast   Final Result      No acute abnormality.      Prior aneurysm clipping.      All CT scans at this facility are performed  using dose modulation techniques as appropriate to performed exam including the following:  automated exposure control; adjustment of mA and/or kV according to the patients size (this includes techniques or standardized protocols for targeted exams where dose is matched to indication/reason for exam: i.e. extremities or head);  iterative reconstruction technique.         Electronically signed by: Shane Jones   Date:    01/01/2025   Time:    20:27      CT Chest " Without Contrast   Final Result      Stable suspected pulmonary fibrosis of the lungs.  No acute infiltrates.         Electronically signed by: Tommy Maravilla MD   Date:    12/31/2024   Time:    13:59          Inpatient Medications:  Continuous Infusions:    Scheduled Meds:   ascorbic acid (vitamin C)  500 mg Oral BID    atorvastatin  40 mg Oral Daily    clopidogreL  75 mg Oral Daily    dextromethorphan-guaiFENesin  mg/5 ml  10 mL Oral Q6H    enoxparin  1 mg/kg Subcutaneous BID    EScitalopram oxalate  20 mg Oral Daily    fluticasone furoate-vilanteroL  1 puff Inhalation Daily    gabapentin  200 mg Oral TID    ipratropium-albuteroL  1 puff Inhalation Q6H    isosorbide mononitrate  120 mg Oral QHS    levETIRAcetam  500 mg Oral BID    methylPREDNISolone injection (PEDS and ADULTS)  60 mg Intravenous Q8H    metoprolol tartrate  50 mg Oral BID    montelukast  10 mg Oral Daily    multivitamin  1 tablet Oral Daily    pantoprazole  40 mg Oral BID    remdesivir infusion  100 mg Intravenous Daily    roflumilast  1 tablet Oral Daily    sacubitriL-valsartan  1 tablet Oral BID    sodium bicarbonate  650 mg Oral TID       PRN Meds:  Current Facility-Administered Medications:     acetaminophen, 650 mg, Oral, Q6H PRN    dextrose 50%, 12.5 g, Intravenous, PRN    dextrose 50%, 25 g, Intravenous, PRN    glucagon (human recombinant), 1 mg, Intramuscular, PRN    glucose, 16 g, Oral, PRN    glucose, 24 g, Oral, PRN    ondansetron, 4 mg, Intravenous, Q8H PRN    sodium chloride 0.9%, 10 mL, Intravenous, PRN

## 2025-01-02 NOTE — CONSULTS
[facility] - Tele[facility] ([facility])  Wound Care    Patient Name:  Shireen Felix   MRN:  [id number]  Date: 2025  Diagnosis: COVID-19    History:     Past Medical History:   Diagnosis Date    Abnormal ECG 2019    Acute bronchitis and bronchiolitis 2024    Aneurysm     Anticoagulant long-term use     Arthritis     CAD in native artery 2019    COPD (chronic obstructive pulmonary disease)     Diabetes mellitus     Fall 10/10/2019    Formatting of this note might be different from the original. Found sitting on floor next to bed last night Mild confusion today Does not recall falling or how she ended up on floor UA today Labs yesterday unremarkable    Glaucoma     Hypertension     Seizures     Shingles 2017    Stroke        Social History     Socioeconomic History    Marital status: Single   Tobacco Use    Smoking status: Former     Current packs/day: 0.00     Average packs/day: 1 pack/day for 42.3 years (42.3 ttl pk-yrs)     Types: Cigarettes     Start date:      Quit date: 2004     Years since quittin.7    Smokeless tobacco: Former   Substance and Sexual Activity    Alcohol use: No    Drug use: Never    Sexual activity: Not Currently     Partners: Male   Social History Narrative    No pets or smokers in household.     Social Drivers of Health     Financial Resource Strain: High Risk (2024)    Overall Financial Resource Strain (CARDIA)     Difficulty of Paying Living Expenses: Very hard   Food Insecurity: No Food Insecurity (2024)    Hunger Vital Sign     Worried About Running Out of Food in the Last Year: Never true     Ran Out of Food in the Last Year: Never true   Transportation Needs: No Transportation Needs (2024)    [facility] from [facility] and Its Subsidiaries and Affiliates    [facility]     Lack of Transportation (Medical): No     Lack of Transportation (Non-Medical): No   Physical Activity: Unknown  (7/30/2024)    Exercise Vital Sign     Days of Exercise per Week: 3 days   Recent Concern: Physical Activity - Inactive (6/17/2024)    [facility]    Exercise Vital Sign     Days of Exercise per Week: 0 days     Minutes of Exercise per Session: 0 min   Stress: Patient Declined (7/30/2024)    [employer] [facility] - Occupational Stress Questionnaire     Feeling of Stress : Patient declined   Recent Concern: Stress - Stress Concern Present (6/17/2024)    [facility] from [facility] and Its Subsidiaries and Affiliates    [facility] [employer] - Occupational Stress Questionnaire     Feeling of Stress : Very much       Precautions:     Allergies as of 12/31/2024 - Reviewed 12/31/2024   Allergen Reaction Noted    Penicillins Anaphylaxis, Hives, Shortness Of Breath, and Swelling 06/29/2012    Penicillin  05/15/2024    Adhesive Rash 08/26/2014    Doxycycline Nausea Only 04/26/2017    Oxycodone Other (See Comments) 04/11/2019    Sulfa (sulfonamide antibiotics) Itching 04/11/2019       [facility] Assessment Details/Treatment     Consulted on this 78 y/o female patient for present on admission wound to the buttocks. Patient was admitted 12/31/24 for evaluation of cough and was diagnosed with COVID-19 infection. PMH significant for Abnormal ECG, Acute bronchitis and bronchiolitis, Aneurysm, Anticoagulant long-term use, Arthritis, CAD in native artery, COPD (chronic obstructive pulmonary disease), Diabetes mellitus, Fall, Glaucoma, Hypertension, Seizures, Shingles, and Stroke.   Proper PPE donned prior to entering the room.   Patient sitting up in bed, awake and alert.   Patient states this is the 4th time she has had COVID with a previous time being in June of 2024 where she believes the wound to her bottom began. States she has been dealing with this wound for a while now  but is unable to recall how severe the wound was at any point, she believes it is mostly closed now.   --Assisted patient in turning to her Left side. Sacral foam dressing gently removed. Noted unstageable pressure injury to the sacral spine with surrounding blanchable redness to the buttock and 3 areas of intact divits in the skin. Wound bed to scrum is covered in dry tan eschar that is not removed with gentle cleansing. No drainage noted at this time. Unable to find previous documentation in chart from prior hospitalizations to determine prior severity of wound. Cleansed with vashe, patted dry. Applied zinc oxide containing moisture barrier paste and covered with sacral foam. Recommend cleansing and reapplying paste every 2 days or PRN for incontinence episodes.   --Bilateral heels intact, blanchable redness noted to the left heel no redness noted to right heel. Recommend application of preventative bordered heel foam dressings and heel offloading boots.     Recommend pressure injury preventions such as moisture management, heel offloading, and  repositioning every 2 hours with the foam wedge. Waffle overlay in place to mattress, inflated properly.     Will place orders. Plan to F/U in a week.      01/02/25 0908   WOCN Assessment   WOCN Total Time (mins) 45   Visit Date 01/02/25   Visit Time 0908   Consult Type New   WOCN Speciality Wound   Wound pressure;At risk for pressure Injury   Number of Wounds 1   Intervention assessed;changed;applied;chart review;orders   Teaching on-going        Wound 01/01/25 2100 Pressure Injury Sacral spine   Date First Assessed/Time First Assessed: 01/01/25 2100   Present on Original Admission: Yes  Primary Wound Type: Pressure Injury  Location: Sacral spine   Wound Image    Pressure Injury Stage U   Dressing Appearance Clean;Dry;Intact   Drainage Amount None   Drainage Characteristics/Odor No odor   Appearance Dry;Tan;Yellow;Eschar   Yellow (%), Wound Tissue Color 100 %   Periwound  Area Redness   Wound Length (cm) 6 cm   Wound Width (cm) 4 cm   Wound Depth (cm) 0.1 cm   Wound Volume (cm^3) 2.4 cm^3   Wound Surface Area (cm^2) 24 cm^2   Care Cleansed with:;Antimicrobial agent   Dressing Applied;Foam   Periwound Care Moisture barrier applied   Dressing Change Due 01/04/25 01/02/2025

## 2025-01-02 NOTE — ASSESSMENT & PLAN NOTE
Creatine stable for now. BMP reviewed- noted Estimated Creatinine Clearance: 33 mL/min (based on SCr of 1.1 mg/dL). according to latest data. Based on current GFR, CKD stage is stage 4 - GFR 15-29.  Monitor UOP and serial BMP and adjust therapy as needed. Renally dose meds. Avoid nephrotoxic medications and procedures.  -Creatinine 1.5 however 3 months ago was 1.2  -Due to GFR, will need to dc ranexa for now and decrease dose of gabapentin  -Hold spironolactone   -Continue Entresto   -Monitor creatinine

## 2025-01-03 LAB
ALBUMIN SERPL BCP-MCNC: 2.5 G/DL (ref 3.5–5.2)
ALP SERPL-CCNC: 50 U/L (ref 40–150)
ALT SERPL W/O P-5'-P-CCNC: 9 U/L (ref 10–44)
ANION GAP SERPL CALC-SCNC: 6 MMOL/L (ref 8–16)
AST SERPL-CCNC: 10 U/L (ref 10–40)
BASOPHILS # BLD AUTO: 0.02 K/UL (ref 0–0.2)
BASOPHILS NFR BLD: 0.2 % (ref 0–1.9)
BILIRUB SERPL-MCNC: 0.2 MG/DL (ref 0.1–1)
BUN SERPL-MCNC: 28 MG/DL (ref 8–23)
CALCIUM SERPL-MCNC: 8.8 MG/DL (ref 8.7–10.5)
CHLORIDE SERPL-SCNC: 112 MMOL/L (ref 95–110)
CO2 SERPL-SCNC: 18 MMOL/L (ref 23–29)
CREAT SERPL-MCNC: 0.9 MG/DL (ref 0.5–1.4)
DIFFERENTIAL METHOD BLD: ABNORMAL
EOSINOPHIL # BLD AUTO: 0 K/UL (ref 0–0.5)
EOSINOPHIL NFR BLD: 0 % (ref 0–8)
ERYTHROCYTE [DISTWIDTH] IN BLOOD BY AUTOMATED COUNT: 14.4 % (ref 11.5–14.5)
EST. GFR  (NO RACE VARIABLE): >60 ML/MIN/1.73 M^2
GLUCOSE SERPL-MCNC: 132 MG/DL (ref 70–110)
HCT VFR BLD AUTO: 39 % (ref 37–48.5)
HGB BLD-MCNC: 12 G/DL (ref 12–16)
IMM GRANULOCYTES # BLD AUTO: 0.19 K/UL (ref 0–0.04)
IMM GRANULOCYTES NFR BLD AUTO: 1.8 % (ref 0–0.5)
LYMPHOCYTES # BLD AUTO: 0.7 K/UL (ref 1–4.8)
LYMPHOCYTES NFR BLD: 6.7 % (ref 18–48)
MAGNESIUM SERPL-MCNC: 2 MG/DL (ref 1.6–2.6)
MCH RBC QN AUTO: 30.5 PG (ref 27–31)
MCHC RBC AUTO-ENTMCNC: 30.8 G/DL (ref 32–36)
MCV RBC AUTO: 99 FL (ref 82–98)
MONOCYTES # BLD AUTO: 0.5 K/UL (ref 0.3–1)
MONOCYTES NFR BLD: 4.4 % (ref 4–15)
NEUTROPHILS # BLD AUTO: 9.4 K/UL (ref 1.8–7.7)
NEUTROPHILS NFR BLD: 86.9 % (ref 38–73)
NRBC BLD-RTO: 0 /100 WBC
PHOSPHATE SERPL-MCNC: 2.6 MG/DL (ref 2.7–4.5)
PLATELET # BLD AUTO: 247 K/UL (ref 150–450)
PMV BLD AUTO: 9.9 FL (ref 9.2–12.9)
POCT GLUCOSE: 119 MG/DL (ref 70–110)
POTASSIUM SERPL-SCNC: 4.2 MMOL/L (ref 3.5–5.1)
PROT SERPL-MCNC: 5.9 G/DL (ref 6–8.4)
RBC # BLD AUTO: 3.94 M/UL (ref 4–5.4)
SODIUM SERPL-SCNC: 136 MMOL/L (ref 136–145)
WBC # BLD AUTO: 10.83 K/UL (ref 3.9–12.7)

## 2025-01-03 PROCEDURE — 83735 ASSAY OF MAGNESIUM: CPT | Performed by: STUDENT IN AN ORGANIZED HEALTH CARE EDUCATION/TRAINING PROGRAM

## 2025-01-03 PROCEDURE — 84100 ASSAY OF PHOSPHORUS: CPT | Performed by: STUDENT IN AN ORGANIZED HEALTH CARE EDUCATION/TRAINING PROGRAM

## 2025-01-03 PROCEDURE — 27000207 HC ISOLATION

## 2025-01-03 PROCEDURE — 99900035 HC TECH TIME PER 15 MIN (STAT)

## 2025-01-03 PROCEDURE — 97530 THERAPEUTIC ACTIVITIES: CPT

## 2025-01-03 PROCEDURE — 94640 AIRWAY INHALATION TREATMENT: CPT

## 2025-01-03 PROCEDURE — 97166 OT EVAL MOD COMPLEX 45 MIN: CPT

## 2025-01-03 PROCEDURE — 97162 PT EVAL MOD COMPLEX 30 MIN: CPT

## 2025-01-03 PROCEDURE — 21400001 HC TELEMETRY ROOM

## 2025-01-03 PROCEDURE — 27000221 HC OXYGEN, UP TO 24 HOURS

## 2025-01-03 PROCEDURE — 63600175 PHARM REV CODE 636 W HCPCS: Mod: JZ | Performed by: NURSE PRACTITIONER

## 2025-01-03 PROCEDURE — 25000003 PHARM REV CODE 250: Performed by: FAMILY MEDICINE

## 2025-01-03 PROCEDURE — 85025 COMPLETE CBC W/AUTO DIFF WBC: CPT | Performed by: STUDENT IN AN ORGANIZED HEALTH CARE EDUCATION/TRAINING PROGRAM

## 2025-01-03 PROCEDURE — 36415 COLL VENOUS BLD VENIPUNCTURE: CPT | Performed by: STUDENT IN AN ORGANIZED HEALTH CARE EDUCATION/TRAINING PROGRAM

## 2025-01-03 PROCEDURE — 25000003 PHARM REV CODE 250: Performed by: STUDENT IN AN ORGANIZED HEALTH CARE EDUCATION/TRAINING PROGRAM

## 2025-01-03 PROCEDURE — 25000003 PHARM REV CODE 250: Performed by: NURSE PRACTITIONER

## 2025-01-03 PROCEDURE — 25000242 PHARM REV CODE 250 ALT 637 W/ HCPCS: Performed by: STUDENT IN AN ORGANIZED HEALTH CARE EDUCATION/TRAINING PROGRAM

## 2025-01-03 PROCEDURE — 80053 COMPREHEN METABOLIC PANEL: CPT | Performed by: STUDENT IN AN ORGANIZED HEALTH CARE EDUCATION/TRAINING PROGRAM

## 2025-01-03 PROCEDURE — 94761 N-INVAS EAR/PLS OXIMETRY MLT: CPT

## 2025-01-03 RX ORDER — ACETYLCYSTEINE 100 MG/ML
4 SOLUTION ORAL; RESPIRATORY (INHALATION) ONCE
Status: COMPLETED | OUTPATIENT
Start: 2025-01-03 | End: 2025-01-03

## 2025-01-03 RX ORDER — SODIUM BICARBONATE 650 MG/1
1300 TABLET ORAL 3 TIMES DAILY
Status: DISCONTINUED | OUTPATIENT
Start: 2025-01-03 | End: 2025-01-07

## 2025-01-03 RX ORDER — GUAIFENESIN 600 MG/1
600 TABLET, EXTENDED RELEASE ORAL 2 TIMES DAILY
Status: DISCONTINUED | OUTPATIENT
Start: 2025-01-03 | End: 2025-01-07

## 2025-01-03 RX ADMIN — HYDROXYZINE HYDROCHLORIDE 25 MG: 25 TABLET ORAL at 05:01

## 2025-01-03 RX ADMIN — SODIUM BICARBONATE 650 MG TABLET 1300 MG: at 11:01

## 2025-01-03 RX ADMIN — FLUTICASONE FUROATE AND VILANTEROL TRIFENATATE 1 PUFF: 100; 25 POWDER RESPIRATORY (INHALATION) at 07:01

## 2025-01-03 RX ADMIN — GABAPENTIN 200 MG: 100 CAPSULE ORAL at 09:01

## 2025-01-03 RX ADMIN — ENOXAPARIN SODIUM 50 MG: 60 INJECTION SUBCUTANEOUS at 09:01

## 2025-01-03 RX ADMIN — CLOPIDOGREL BISULFATE 75 MG: 75 TABLET ORAL at 09:01

## 2025-01-03 RX ADMIN — GUAIFENESIN AND DEXTROMETHORPHAN 10 ML: 100; 10 SYRUP ORAL at 12:01

## 2025-01-03 RX ADMIN — REMDESIVIR 100 MG: 100 INJECTION, POWDER, LYOPHILIZED, FOR SOLUTION INTRAVENOUS at 09:01

## 2025-01-03 RX ADMIN — HYDROXYZINE HYDROCHLORIDE 25 MG: 25 TABLET ORAL at 12:01

## 2025-01-03 RX ADMIN — ESCITALOPRAM OXALATE 20 MG: 10 TABLET, FILM COATED ORAL at 09:01

## 2025-01-03 RX ADMIN — MONTELUKAST 10 MG: 10 TABLET, FILM COATED ORAL at 09:01

## 2025-01-03 RX ADMIN — PANTOPRAZOLE SODIUM 40 MG: 40 TABLET, DELAYED RELEASE ORAL at 08:01

## 2025-01-03 RX ADMIN — METHYLPREDNISOLONE SODIUM SUCCINATE 60 MG: 40 INJECTION, POWDER, FOR SOLUTION INTRAMUSCULAR; INTRAVENOUS at 05:01

## 2025-01-03 RX ADMIN — SACUBITRIL AND VALSARTAN 1 TABLET: 24; 26 TABLET, FILM COATED ORAL at 08:01

## 2025-01-03 RX ADMIN — METOPROLOL TARTRATE 50 MG: 50 TABLET, FILM COATED ORAL at 08:01

## 2025-01-03 RX ADMIN — SACUBITRIL AND VALSARTAN 1 TABLET: 24; 26 TABLET, FILM COATED ORAL at 09:01

## 2025-01-03 RX ADMIN — GUAIFENESIN AND DEXTROMETHORPHAN 10 ML: 100; 10 SYRUP ORAL at 05:01

## 2025-01-03 RX ADMIN — METOPROLOL TARTRATE 50 MG: 50 TABLET, FILM COATED ORAL at 09:01

## 2025-01-03 RX ADMIN — ACETAMINOPHEN 650 MG: 325 TABLET ORAL at 05:01

## 2025-01-03 RX ADMIN — GUAIFENESIN 600 MG: 600 TABLET, EXTENDED RELEASE ORAL at 08:01

## 2025-01-03 RX ADMIN — THERA TABS 1 TABLET: TAB at 09:01

## 2025-01-03 RX ADMIN — ISOSORBIDE MONONITRATE 120 MG: 60 TABLET, EXTENDED RELEASE ORAL at 08:01

## 2025-01-03 RX ADMIN — Medication 6 MG: at 11:01

## 2025-01-03 RX ADMIN — ROFLUMILAST 500 MCG: 500 TABLET ORAL at 09:01

## 2025-01-03 RX ADMIN — OXYCODONE HYDROCHLORIDE AND ACETAMINOPHEN 500 MG: 500 TABLET ORAL at 09:01

## 2025-01-03 RX ADMIN — GABAPENTIN 200 MG: 100 CAPSULE ORAL at 03:01

## 2025-01-03 RX ADMIN — OXYCODONE HYDROCHLORIDE AND ACETAMINOPHEN 500 MG: 500 TABLET ORAL at 08:01

## 2025-01-03 RX ADMIN — IPRATROPIUM BROMIDE AND ALBUTEROL 1 PUFF: 20; 100 SPRAY, METERED RESPIRATORY (INHALATION) at 08:01

## 2025-01-03 RX ADMIN — SODIUM BICARBONATE 650 MG TABLET 650 MG: at 09:01

## 2025-01-03 RX ADMIN — SODIUM BICARBONATE 650 MG TABLET 650 MG: at 03:01

## 2025-01-03 RX ADMIN — ACETYLCYSTEINE 4 ML: 100 INHALANT RESPIRATORY (INHALATION) at 08:01

## 2025-01-03 RX ADMIN — LEVETIRACETAM 500 MG: 500 TABLET, FILM COATED ORAL at 09:01

## 2025-01-03 RX ADMIN — SODIUM BICARBONATE 650 MG TABLET 650 MG: at 08:01

## 2025-01-03 RX ADMIN — METHYLPREDNISOLONE SODIUM SUCCINATE 60 MG: 40 INJECTION, POWDER, FOR SOLUTION INTRAMUSCULAR; INTRAVENOUS at 03:01

## 2025-01-03 RX ADMIN — ATORVASTATIN CALCIUM 40 MG: 40 TABLET, FILM COATED ORAL at 09:01

## 2025-01-03 RX ADMIN — METHYLPREDNISOLONE SODIUM SUCCINATE 60 MG: 40 INJECTION, POWDER, FOR SOLUTION INTRAMUSCULAR; INTRAVENOUS at 09:01

## 2025-01-03 RX ADMIN — IPRATROPIUM BROMIDE AND ALBUTEROL 1 PUFF: 20; 100 SPRAY, METERED RESPIRATORY (INHALATION) at 01:01

## 2025-01-03 RX ADMIN — GABAPENTIN 200 MG: 100 CAPSULE ORAL at 08:01

## 2025-01-03 RX ADMIN — LEVETIRACETAM 500 MG: 500 TABLET, FILM COATED ORAL at 08:01

## 2025-01-03 RX ADMIN — PANTOPRAZOLE SODIUM 40 MG: 40 TABLET, DELAYED RELEASE ORAL at 09:01

## 2025-01-03 RX ADMIN — IPRATROPIUM BROMIDE AND ALBUTEROL 1 PUFF: 20; 100 SPRAY, METERED RESPIRATORY (INHALATION) at 05:01

## 2025-01-03 RX ADMIN — ENOXAPARIN SODIUM 50 MG: 60 INJECTION SUBCUTANEOUS at 08:01

## 2025-01-03 NOTE — SUBJECTIVE & OBJECTIVE
Interval History:  Headache resolved. No acute events overnight, afebrile, hemodynamically stable.  Still reports significant respiratory symptoms with persistent cough, dyspnea, however does reports some improvements.    Objective:     Vital Signs (Most Recent):  Temp: 97.9 °F (36.6 °C) (01/02/25 2010)  Pulse: 66 (01/02/25 2045)  Resp: 15 (01/02/25 2010)  BP: (!) 140/60 (01/02/25 2010)  SpO2: 100 % (01/02/25 2010) Vital Signs (24h Range):  Temp:  [97.3 °F (36.3 °C)-98.2 °F (36.8 °C)] 97.9 °F (36.6 °C)  Pulse:  [60-69] 66  Resp:  [15-20] 15  SpO2:  [97 %-100 %] 100 %  BP: (119-145)/(56-63) 140/60     Weight: 54.4 kg (119 lb 14.9 oz)  Body mass index is 20.59 kg/m².    Intake/Output Summary (Last 24 hours) at 1/2/2025 2157  Last data filed at 1/2/2025 1703  Gross per 24 hour   Intake --   Output 900 ml   Net -900 ml         Physical Exam  Vitals and nursing note reviewed.   Constitutional:       General: She is not in acute distress.     Appearance: She is not ill-appearing, toxic-appearing or diaphoretic.   HENT:      Head: Normocephalic and atraumatic.      Mouth/Throat:      Mouth: Mucous membranes are moist.   Eyes:      General: No scleral icterus.        Right eye: No discharge.         Left eye: No discharge.   Cardiovascular:      Rate and Rhythm: Normal rate and regular rhythm.      Heart sounds: Normal heart sounds.   Pulmonary:      Effort: Pulmonary effort is normal. No respiratory distress.      Breath sounds: Decreased breath sounds, wheezing and rhonchi present.   Abdominal:      General: Bowel sounds are normal.      Palpations: Abdomen is soft.      Tenderness: There is no abdominal tenderness.   Musculoskeletal:      Cervical back: No rigidity.      Right lower leg: No edema.      Left lower leg: No edema.   Skin:     General: Skin is warm and dry.      Coloration: Skin is not jaundiced.   Neurological:      Mental Status: She is alert and oriented to person, place, and time. Mental status is at  baseline.   Psychiatric:         Mood and Affect: Mood normal.         Behavior: Behavior normal.             Significant Labs: All pertinent labs within the past 24 hours have been reviewed.   Recent Labs   Lab 12/31/24  1328 01/01/25  0505 01/02/25  0542    140 134*   K 3.1* 4.0 4.3    115* 111*   CO2 16* 16* 16*   BUN 34* 29* 29*   CREATININE 1.5* 1.1 0.9   * 153* 142*   ANIONGAP 14 9 7*     Recent Labs   Lab 12/31/24  1328 01/01/25  0505 01/02/25  0542   MG <0.7* 1.3* 2.1   PHOS  --   --  3.2     Recent Labs   Lab 12/31/24  1328 01/02/25  0542   AST 12 11   ALT 10 10   ALKPHOS 45 45   BILITOT 0.3 0.2   ALBUMIN 2.9* 2.3*     POCT Glucose:   Recent Labs   Lab 01/02/25  2049   POCTGLUCOSE 160*    Recent Labs   Lab 12/31/24  1328 01/01/25  0331 01/02/25  0542   WBC 10.06 6.18 12.56   HGB 11.9* 11.1* 10.2*   HCT 37.0 35.3* 33.7*    238 239   GRAN 64.9  6.5 86.8*  5.4 85.7*  10.8*              Significant Imaging:  I have reviewed all pertinent imaging results/findings within the past 24 hours.   CT Head Without Contrast   Final Result      No acute abnormality.      Prior aneurysm clipping.      All CT scans at this facility are performed  using dose modulation techniques as appropriate to performed exam including the following:  automated exposure control; adjustment of mA and/or kV according to the patients size (this includes techniques or standardized protocols for targeted exams where dose is matched to indication/reason for exam: i.e. extremities or head);  iterative reconstruction technique.         Electronically signed by: Shane Jones   Date:    01/01/2025   Time:    20:27      CT Chest Without Contrast   Final Result      Stable suspected pulmonary fibrosis of the lungs.  No acute infiltrates.         Electronically signed by: Tommy Maravilla MD   Date:    12/31/2024   Time:    13:59          Inpatient Medications:  Continuous Infusions:    Scheduled Meds:   ascorbic acid  (vitamin C)  500 mg Oral BID    atorvastatin  40 mg Oral Daily    clopidogreL  75 mg Oral Daily    dextromethorphan-guaiFENesin  mg/5 ml  10 mL Oral Q6H    enoxparin  1 mg/kg Subcutaneous BID    EScitalopram oxalate  20 mg Oral Daily    fluticasone furoate-vilanteroL  1 puff Inhalation Daily    gabapentin  200 mg Oral TID    ipratropium-albuteroL  1 puff Inhalation Q6H    isosorbide mononitrate  120 mg Oral QHS    levETIRAcetam  500 mg Oral BID    methylPREDNISolone injection (PEDS and ADULTS)  60 mg Intravenous Q8H    metoprolol tartrate  50 mg Oral BID    montelukast  10 mg Oral Daily    multivitamin  1 tablet Oral Daily    pantoprazole  40 mg Oral BID    remdesivir infusion  100 mg Intravenous Daily    roflumilast  1 tablet Oral Daily    sacubitriL-valsartan  1 tablet Oral BID    sodium bicarbonate  650 mg Oral TID       PRN Meds:  Current Facility-Administered Medications:     acetaminophen, 650 mg, Oral, Q6H PRN    dextrose 50%, 12.5 g, Intravenous, PRN    dextrose 50%, 25 g, Intravenous, PRN    glucagon (human recombinant), 1 mg, Intramuscular, PRN    glucose, 16 g, Oral, PRN    glucose, 24 g, Oral, PRN    HYDROcodone-acetaminophen, 1 tablet, Oral, Q6H PRN    hydrOXYzine HCL, 25 mg, Oral, TID PRN    melatonin, 6 mg, Oral, Nightly PRN    ondansetron, 4 mg, Intravenous, Q8H PRN    sodium chloride 0.9%, 10 mL, Intravenous, PRN

## 2025-01-03 NOTE — PT/OT/SLP EVAL
Occupational Therapy Evaluation and Treatment    Name: Shireen Felix  MRN: [id number]  Admitting Diagnosis: COVID-19  Recent Surgery: * No surgery found *      Recommendations:     Discharge Recommendations: Moderate Intensity Therapy  Level of Assistance Recommended: 24 hours significant assistance  Discharge Equipment Recommendations: none  Barriers to discharge:      Assessment:     Shireen Felix is a 79 y.o. female with a medical diagnosis of COVID-19. She presents with performance deficits affecting function including weakness, impaired functional mobility, decreased safety awareness, gait instability, impaired balance, impaired self care skills, decreased lower extremity function, decreased ROM, decreased upper extremity function, pain.     Rehab Prognosis: Good; patient would benefit from acute OT services to address these deficits and reach maximum level of function.    Plan:     Patient to be seen 2 x/week to address the above listed problems via self-care/home management, therapeutic activities, therapeutic exercises  Plan of Care Expires: 01/17/25  Plan of Care Reviewed with:      Subjective     Chief Complaint:  debility and generalized weakness  Patient Comments/Goals: get stronger to live (I)'ly    Pain/Comfort:  Pain Rating 1: 0/10    Patients cultural, spiritual, Congregational conflicts given the current situation:      Social History:  Living Environment: Patient lives alone in a single story home with number of outside stair(s): 0  Prior Level of Function: Prior to admission, patient was modified independent  Roles and Routines: Patient was not driving and not working prior to admission.  Equipment Used at Home: walker, rolling, wheelchair, glucometer  Assistance Upon Discharge: facility staff    Objective:     Communicated with nurse and epic chart review prior to session. Patient found HOB elevated with telemetry upon OT entry to room.    General Precautions: Standard, fall, airborne,  contact, droplet   Orthopedic Precautions: N/A   Braces: N/A    Respiratory Status: Nasal cannula, flow 2 L/min    Occupational Performance    Gait belt applied - Yes    Bed Mobility:   Rolling/Turning to Left with minimum assistance  Rolling/Turning to Right with minimum assistance  Scooting to HOB in supine: minimum assistance  Scooting anteriorly to EOB to have both feet planted on floor: minimum assistance  Supine to sit from right side of bed with minimum assistance  Supine to sit from left side of bed with minimum assistance    Functional Mobility/Transfers:  Sit <> Stand Transfer with minimum assistance and 2 persons with rolling walker  Bed <> Chair Transfer using Step Transfer technique with minimum assistance and 2 persons with rolling walker    Activities of Daily Living:  Upper Body Dressing: minimum assistance  Lower Body Dressing: minimum assistance    Cognitive/Visual Perceptual:  Cognitive/Psychosocial Skills:    -     Oriented to: Person, Place, Time, Situation  -     Follows Commands/attention: Follows multistep  commands  -     Communication: clear/fluent  -     Memory: No Deficits noted  -     Safety awareness/insight to disability: impaired    Physical Exam:  Upper Extremity Range of Motion:     -       Right Upper Extremity: shoulder flexion approx 10-20 degrees  -       Left Upper Extremity: WFL  Upper Extremity Strength:    -       Right Upper Extremity: rue MMT: 3/5 grossly except R shoulder MMT   1/5 grossly  -       Left Upper Extremity: mmt: 3/5 grossly   Strength:    -       Right Upper Extremity: mmt: 3/5 grossly  -       Left Upper Extremity: mmt: 3/5 grossly    AMPAC 6 Click ADL:  AMPAC Total Score: 14    Treatment & Education:  Educated patient on importance of increased tolerance to upright position and direct impact on CV endurance and strength. Patient encouraged to sit up in chair/ EOB, for a minimum of 2 consecutive hours including for all meals.. Encouraged patient to  perform AROM TE to BUE throughout the day within all available planes of motion. Re enforced importance of utilizing call light to meet needs in room and not attempt to get up without staff assistance. Patient verbalized understanding and agreed to comply.    Patient clear to stand pivot transfer with RN/PCT, assist xstep<pivot with min a x 2 with rw grab bar .    Patient left up in chair with all lines intact, call button in reach, and RN notified.    GOALS:   Multidisciplinary Problems       Occupational Therapy Goals          Problem: Occupational Therapy    Goal Priority Disciplines Outcome Interventions   Occupational Therapy Goal     OT, PT/OT     Description: O.T GOALS MET BY 1-17-25  PT WILL TOLERATE  1 SET X 12 REPS B UE ROM EXERCISE  S WITH UE DRESSING  S WITH LE DRESSING  S WITH TOILET TRANSFERS                         History:     Past Medical History:   Diagnosis Date    Abnormal ECG 08/05/2019    Acute bronchitis and bronchiolitis 12/31/2024    Aneurysm     Anticoagulant long-term use     Arthritis     CAD in native artery 08/05/2019    COPD (chronic obstructive pulmonary disease)     Diabetes mellitus     Fall 10/10/2019    Formatting of this note might be different from the original. Found sitting on floor next to bed last night Mild confusion today Does not recall falling or how she ended up on floor UA today Labs yesterday unremarkable    Glaucoma     Hypertension     Seizures     Shingles 05/27/2017    Stroke          Past Surgical History:   Procedure Laterality Date    BRAIN SURGERY      CARDIAC CATHETERIZATION      CATHETERIZATION OF BOTH LEFT AND RIGHT HEART N/A 5/17/2024    Procedure: CATHETERIZATION, HEART, BOTH LEFT AND RIGHT;  Surgeon: Rachelle Sarabia MD;  Location: [facility] CATH LAB;  Service: Cardiology;  Laterality: N/A;    COLONOSCOPY N/A 09/21/2023    Procedure: COLONOSCOPY;  Surgeon: Joanna Leal MD;  Location: [facility] ENDO;  Service: Endoscopy;  Laterality: N/A;    CORONARY  ANGIOPLASTY      EPIDURAL STEROID INJECTION INTO CERVICAL SPINE N/A 2/7/2024    Procedure: Cervical IL XANDER, Level C6/7, RN IV sedation;  Surgeon: Francisco Oshea MD;  Location: [facility] PAIN MGT;  Service: Pain Management;  Laterality: N/A;    EPIDURAL STEROID INJECTION INTO LUMBAR SPINE Bilateral 11/12/2019    Procedure: TF XANDER L4/5;  Surgeon: Desean Dias MD;  Location: [facility] PAIN MGT;  Service: Pain Management;  Laterality: Bilateral;    HYSTERECTOMY      INJECTION OF ANESTHETIC AGENT AROUND MEDIAL BRANCH NERVES INNERVATING LUMBAR FACET JOINT Bilateral 01/31/2020    Procedure: Bilateral L3-5 MBB;  Surgeon: Desean Dias MD;  [facility];  Service: Pain Management;  Laterality: Bilateral;    INJECTION OF ANESTHETIC AGENT INTO SACROILIAC JOINT Right 11/16/2021    Procedure: Right BLOCK, SACROILIAC JOINT Right GTB with RN IV sedation;  Surgeon: Yao Fulton MD;  Location: [facility] PAIN MGT;  Service: Pain Management;  Laterality: Right;    INJECTION OF ANESTHETIC AGENT INTO SACROILIAC JOINT Bilateral 07/28/2023    Procedure: Bilateral GT bursa + bilateral SIJ injection;  Surgeon: Francisco Oshea MD;  [facility];  Service: Pain Management;  Laterality: Bilateral;    INJECTION OF JOINT Bilateral 07/09/2020    Procedure: Bilateral shoulder GH Joint injection with local;  Surgeon: Desean Dias MD;  Location: [facility]IN [facility]GT;  Service: Pain Management;  Laterality: Bilateral;    INJECTION OF JOINT Bilateral 07/28/2023    Procedure: Bilateral GT bursa + bilateral SIJ injection NEEDS ADDITIONAL SEDATION AND MORE TIME BEFORE INJECTION RN IV Sedation;  Surgeon: Francisco Oshea MD;  Location: [facility]IN [facility]GT;  Service: Pain Management;  Laterality: Bilateral;    INJECTION OF JOINT N/A 10/25/2023    Procedure: Sacrococcygeal joint injection;  Surgeon: Francisco Oshea MD;  Location: [facility]IN [facility]GT;  Service: Pain Management;  Laterality: N/A;    OOPHORECTOMY      SELECTIVE INJECTION OF ANESTHETIC AGENT  AROUND LUMBAR SPINAL NERVE ROOT BY TRANSFORAMINAL APPROACH Bilateral 04/26/2023    Procedure: Bilateral L4/5 TF XANDER RN IV Sedation;  Surgeon: Francisco Oshea MD;  Location: [facility] PAIN MGT;  Service: Pain Management;  Laterality: Bilateral;    TRANSFORAMINAL EPIDURAL INJECTION OF STEROID Bilateral 07/23/2019    Procedure: Bilateral L3/4 Transforaminal Epidural Steroid Injection;  Surgeon: Desean Dias MD;  Location: [facility] PAIN MGT;  Service: Pain Management;  Laterality: Bilateral;    TRANSFORAMINAL EPIDURAL INJECTION OF STEROID Bilateral 03/10/2020    Procedure: Right T12/L1 TF XANDER with local;  Surgeon: Desean Dias MD;  Location: [facility] PAIN MGT;  Service: Pain Management;  Laterality: Bilateral;    TRANSFORAMINAL EPIDURAL INJECTION OF STEROID Right 06/19/2020    Procedure: Right T12/L1 TFESI Covid day of procedure;  Surgeon: Desean Dias MD;  Location: [facility] PAIN MGT;  Service: Pain Management;  Laterality: Right;       Time Tracking:     OT Date of Treatment: 01/03/25  OT Start Time: 0915  OT Stop Time: 0940  OT Total Time (min): 25 min    Billable Minutes: Evaluation 15 minutes and Therapeutic Activity 10 minutes    1/3/2025

## 2025-01-03 NOTE — ASSESSMENT & PLAN NOTE
Patient's most recent potassium results are listed below.   Recent Labs     12/31/24  1328 01/01/25  0505 01/02/25  0542   K 3.1* 4.0 4.3       Plan  - Replete potassium per protocol  - Monitor potassium Daily  - Patient's hypokalemia is  being treated. Received KCL 40 mEq PO x 1 in ED

## 2025-01-03 NOTE — PLAN OF CARE
01/03/25 1427   Post-Acute Status   Post-Acute Authorization Placement   Post-Acute Placement Status Referrals Sent   Discharge Plan   Discharge Plan A Skilled Nursing Facility     Spoke with daughter Audrey regarding SNF placement.  She prefers to send patient to [facility] or [place] near [facility].  Discussed will have to send blanket referrals to see who will accept patient with covid diagnosis and will determine her choice when accepting facilities are noted.  Referrals sent to [facility]; [facility]; [facility]; [facility]; [facility]; [facility]; [facility]; [facility].

## 2025-01-03 NOTE — ASSESSMENT & PLAN NOTE
Creatine stable for now. BMP reviewed- noted Estimated Creatinine Clearance: 42.8 mL/min (based on SCr of 0.9 mg/dL). according to latest data. Based on current GFR, CKD stage is stage 4 - GFR 15-29.  Monitor UOP and serial BMP and adjust therapy as needed. Renally dose meds. Avoid nephrotoxic medications and procedures.  -Creatinine 1.5 on admission however 3 months ago was 1.2    Recent Labs     12/31/24  1328 01/01/25  0505 01/02/25  0542   CREATININE 1.5* 1.1 0.9       -Due to GFR, will need to dc ranexa for now and decrease dose of gabapentin  -Hold spironolactone   -Continue Entresto   -Monitor creatinine

## 2025-01-03 NOTE — ASSESSMENT & PLAN NOTE
Anemia is likely due to chronic disease due to Chronic Kidney Disease. Most recent hemoglobin and hematocrit are listed below.  Recent Labs     12/31/24  1328 01/01/25  0331 01/02/25  0542   HGB 11.9* 11.1* 10.2*   HCT 37.0 35.3* 33.7*       Plan  - Monitor serial CBC: Daily  - Transfuse PRBC if patient becomes hemodynamically unstable, symptomatic or H/H drops below 7/21.  - Patient has not received any PRBC transfusions to date  - Patient's anemia is currently stable

## 2025-01-03 NOTE — PT/OT/SLP EVAL
Physical Therapy Evaluation and Treatment    Patient Name: Shireen Felix   MRN: [id number]  Recent Surgery: * No surgery found *      Recommendations:     Discharge Recommendations: Moderate Intensity Therapy   Discharge Equipment Recommendations: to be determined by next level of care   Barriers to discharge: None    Assessment:     Shireen Felix is a 79 y.o. female admitted with a medical diagnosis of COVID-19. She presents with the following impairments/functional limitations: weakness, impaired endurance, impaired functional mobility, gait instability, impaired balance, decreased safety awareness, decreased lower extremity function, decreased ROM, impaired cardiopulmonary response to activity.    Rehab Prognosis: Good; patient would benefit from acute PT services to address these deficits and reach maximum level of function.    Plan:     During this hospitalization, patient to be seen 3 x/week to address the above listed problems via gait training, therapeutic activities, therapeutic exercises    Plan of Care Expires: 01/17/25    Subjective     Chief Complaint: Pt is motivated to participate, pt coughing throughout tx, improved when sitting upright  Patient Comments/Goals: none stated  Pain/Comfort:  Pain Rating 1: 0/10    Social History:  Living Environment: Patient lives alone in a single story home with ramp  Prior Level of Function: Prior to admission, patient was modified independent, not driving and retired, and ambulated household distances using W/C vs RW vs SPC. Reports multiple falls at home. Has assistance from daughter PRN.   Equipment Used at Home: walker, rolling, wheelchair, bedside commode, shower chair, cane, straight  DME owned (not currently used): none  Assistance Upon Discharge: family    Objective:     Communicated with nurse Heller and epic chart review prior to session. Patient found HOB elevated with peripheral IV, telemetry, PureWick, oxygen upon PT entry to room.    General  "Precautions: Standard, fall, airborne, contact, droplet   Orthopedic Precautions: N/A   Braces: N/A    Respiratory Status: Nasal cannula, flow 3 L/min    Exams:  Cognition: Patient is oriented to Person, Place, Time, Situation  RLE ROM: WFL  RLE Strength:  Grossly 4-/5  LLE ROM: WFL  LLE Strength:  Grossly 4-/5  Sensation:    -       Intact  Skin Integrity/Edema:     -       Skin integrity: Visible skin intact    Functional Mobility:  Gait belt applied - Yes  Bed Mobility  Rolling Right: contact guard assistance  Scooting: contact guard assistance  Supine to Sit: contact guard assistance  Transfers  Sit to Stand: minimum assistance and of 2 persons with hand-held assist  Bed to Chair: minimum assistance and of 2 persons with hand-held assist using Step Transfer  Gait  Patient ambulated 4 steps to transfer to chair with hand-held assist and minimum assistance and of 2 persons. Patient demonstrates unsteady gait. No c/o dizziness, mild SOB, educated about pursed lip breathing technique and cued for use with mobility. All lines remained intact throughout ambulation trail.  Balance  Sitting: contact guard assistance  Standing: minimum assistance and of 2 persons    Therapeutic Activities and Exercises:   Pt educated on role of PT in acute care and POC. Educated on importance of OOB activities, activity pacing, and HEP (marching/hip flex, hip abd, heel slides/LAQ, quad sets, ankle pumps) in order to maintain/regain strength. Educated on chair push-ups intermittently while sitting in chair for pressure relief, pt demonstrated x3. Encouraged to sit up in chair for all meals. Educated on "call don't fall" policy and increased risk of falling due to weakness, instructed to utilize call bell for assistance with all transfers. Pt agreeable to all requests.    AM-PAC 6 CLICK MOBILITY  Total Score:16    Patient left up in chair with all lines intact, call button in reach, RN notified, and chair alarm on.    GOALS: "   Multidisciplinary Problems       Physical Therapy Goals          Problem: Physical Therapy    Goal Priority Disciplines Outcome Interventions   Physical Therapy Goal     PT, PT/OT     Description: Goals to be met by 1/17/25.  1. Pt will complete bed mobility SBA.  2. Pt will complete sit to stand SBA.  3. Pt will ambulate 100ft SBA using RW.  4. Pt will increase AMPAC score by 2 points to progress functional mobility.                       History:     Past Medical History:   Diagnosis Date    Abnormal ECG 08/05/2019    Acute bronchitis and bronchiolitis 12/31/2024    Aneurysm     Anticoagulant long-term use     Arthritis     CAD in native artery 08/05/2019    COPD (chronic obstructive pulmonary disease)     Diabetes mellitus     Fall 10/10/2019    Formatting of this note might be different from the original. Found sitting on floor next to bed last night Mild confusion today Does not recall falling or how she ended up on floor UA today Labs yesterday unremarkable    Glaucoma     Hypertension     Seizures     Shingles 05/27/2017    Stroke        Past Surgical History:   Procedure Laterality Date    BRAIN SURGERY      CARDIAC CATHETERIZATION      CATHETERIZATION OF BOTH LEFT AND RIGHT HEART N/A 5/17/2024    Procedure: CATHETERIZATION, HEART, BOTH LEFT AND RIGHT;  Surgeon: Rachelle Sarabia MD;  Location: [facility] CATH LAB;  Service: Cardiology;  Laterality: N/A;    COLONOSCOPY N/A 09/21/2023    Procedure: COLONOSCOPY;  Surgeon: Joanna Leal MD;  Location: [facility] ENDO;  Service: Endoscopy;  Laterality: N/A;    CORONARY ANGIOPLASTY      EPIDURAL STEROID INJECTION INTO CERVICAL SPINE N/A 2/7/2024    Procedure: Cervical IL XANDER, Level C6/7, RN IV sedation;  Surgeon: Francisco Oshea MD;  Location: [facility] PAIN MGT;  Service: Pain Management;  Laterality: N/A;    EPIDURAL STEROID INJECTION INTO LUMBAR SPINE Bilateral 11/12/2019    Procedure: TF XANDER L4/5;  Surgeon: Desean Dias MD;  Location: [facility] PAIN MGT;  Service:  Pain Management;  Laterality: Bilateral;    HYSTERECTOMY      INJECTION OF ANESTHETIC AGENT AROUND MEDIAL BRANCH NERVES INNERVATING LUMBAR FACET JOINT Bilateral 01/31/2020    Procedure: Bilateral L3-5 MBB;  Surgeon: Desean Dias MD;  Location: [facility] PAIN MGT;  Service: Pain Management;  Laterality: Bilateral;    INJECTION OF ANESTHETIC AGENT INTO SACROILIAC JOINT Right 11/16/2021    Procedure: Right BLOCK, SACROILIAC JOINT Right GTB with RN IV sedation;  Surgeon: Yao Fulton MD;  Location: [facility] PAIN [facility]GT;  Service: Pain Management;  Laterality: Right;    INJECTION OF ANESTHETIC AGENT INTO SACROILIAC JOINT Bilateral 07/28/2023    Procedure: Bilateral GT bursa + bilateral SIJ injection;  Surgeon: Francisco Oshea MD;  Location: [facility] PAIN MGT;  Service: Pain Management;  Laterality: Bilateral;    INJECTION OF JOINT Bilateral 07/09/2020    Procedure: Bilateral shoulder GH Joint injection with local;  Surgeon: Desean Dias MD;  Location: [facility] PAIN [facility]GT;  Service: Pain Management;  Laterality: Bilateral;    INJECTION OF JOINT Bilateral 07/28/2023    Procedure: Bilateral GT bursa + bilateral SIJ injection NEEDS ADDITIONAL SEDATION AND MORE TIME BEFORE INJECTION RN IV Sedation;  Surgeon: Francisco Oshea MD;  Location: [facility]IN [facility]GT;  Service: Pain Management;  Laterality: Bilateral;    INJECTION OF JOINT N/A 10/25/2023    Procedure: Sacrococcygeal joint injection;  Surgeon: Francisco Oshea MD;  Location: [facility] PAIN [facility]GT;  Service: Pain Management;  Laterality: N/A;    OOPHORECTOMY      SELECTIVE INJECTION OF ANESTHETIC AGENT AROUND LUMBAR SPINAL NERVE ROOT BY TRANSFORAMINAL APPROACH Bilateral 04/26/2023    Procedure: Bilateral L4/5 TF XANDER RN IV Sedation;  Surgeon: Francisco Oshea MD;  Location: [facility] PAIN [facility]GT;  Service: Pain Management;  Laterality: Bilateral;    TRANSFORAMINAL EPIDURAL INJECTION OF STEROID Bilateral 07/23/2019    Procedure: Bilateral L3/4 Transforaminal Epidural Steroid Injection;  Surgeon: Desean Dias  MD;  Location: [facility] PAIN MGT;  Service: Pain Management;  Laterality: Bilateral;    TRANSFORAMINAL EPIDURAL INJECTION OF STEROID Bilateral 03/10/2020    Procedure: Right T12/L1 TF XANDER with local;  Surgeon: Desean Dias MD;  Location: [facility] PAIN MGT;  Service: Pain Management;  Laterality: Bilateral;    TRANSFORAMINAL EPIDURAL INJECTION OF STEROID Right 06/19/2020    Procedure: Right T12/L1 TFESI Covid day of procedure;  Surgeon: Desean Dias MD;  Location: [facility] PAIN MGT;  Service: Pain Management;  Laterality: Right;       Time Tracking:     PT Received On: 01/03/25  PT Start Time: 0930  PT Stop Time: 0955  PT Total Time (min): 25 min     Billable Minutes: Evaluation 15min and Therapeutic Activity 10min    1/3/2025

## 2025-01-03 NOTE — PROGRESS NOTES
'[facility] Medicine  Progress Note    Patient Name: Shireen Felix  MRN: [id number]  Patient Class: IP- Inpatient   Admission Date: 12/31/2024  Length of Stay: 1 days  Attending Physician: Jalen Leal DO  Primary Care Provider: Laron Chaudhari MD        Subjective     Principal Problem:COVID-19        HPI:  Shireen Felix is a 79 year old female who has  has a past medical history of Abnormal ECG, Acute bronchitis and bronchiolitis, Aneurysm, Anticoagulant long-term use, Arthritis, CAD in native artery, COPD (chronic obstructive pulmonary disease), Diabetes mellitus, Fall, Glaucoma, Hypertension, Seizures, Shingles, and Stroke who presented to Emergency Department for evaluation for cough. Associated symptoms include: congestion, wheezing, and SOB. She was seen by her pulmonologist today, Dr. Sam. Started as sinus pain and drainage and now has progressed to respiratory symptoms. She was seen a few days ago in the ER given prescription for antibiotics steroids but these have not helped much. Today in the office she can barely talk without coughing continually. She is having a difficult time putting more than 3 words together without severe cough paroxysms. ED workup showed Hgb/Hct 11.9/37.0, K+ 3.1, CO2 16, BUN/creatinine 34/1.5. CT chest w/out contrast stable suspected pulmonary fibrosis of the lungs. No acute infiltrates. She has received DuoNeb x 1, solumedrol 125 mg IV x 1, KCL 40 mEq PO x 1 and NS 1L bolus. Pt admitted for COVID.     Overview/Hospital Course:  Admitted to Hospital Medicine for acute on chronic respiratory failure concerns in setting of COVID-19.  Started on IV methylprednisolone, remdesivir, scheduled respiratory treatments.    Interval History:  Headache resolved. No acute events overnight, afebrile, hemodynamically stable.  Still reports significant respiratory symptoms with persistent cough, dyspnea, however does reports some  improvements.    Objective:     Vital Signs (Most Recent):  Temp: 97.9 °F (36.6 °C) (01/02/25 2010)  Pulse: 66 (01/02/25 2045)  Resp: 15 (01/02/25 2010)  BP: (!) 140/60 (01/02/25 2010)  SpO2: 100 % (01/02/25 2010) Vital Signs (24h Range):  Temp:  [97.3 °F (36.3 °C)-98.2 °F (36.8 °C)] 97.9 °F (36.6 °C)  Pulse:  [60-69] 66  Resp:  [15-20] 15  SpO2:  [97 %-100 %] 100 %  BP: (119-145)/(56-63) 140/60     Weight: 54.4 kg (119 lb 14.9 oz)  Body mass index is 20.59 kg/m².    Intake/Output Summary (Last 24 hours) at 1/2/2025 2157  Last data filed at 1/2/2025 1703  Gross per 24 hour   Intake --   Output 900 ml   Net -900 ml         Physical Exam  Vitals and nursing note reviewed.   Constitutional:       General: She is not in acute distress.     Appearance: She is not ill-appearing, toxic-appearing or diaphoretic.   HENT:      Head: Normocephalic and atraumatic.      Mouth/Throat:      Mouth: Mucous membranes are moist.   Eyes:      General: No scleral icterus.        Right eye: No discharge.         Left eye: No discharge.   Cardiovascular:      Rate and Rhythm: Normal rate and regular rhythm.      Heart sounds: Normal heart sounds.   Pulmonary:      Effort: Pulmonary effort is normal. No respiratory distress.      Breath sounds: Decreased breath sounds, wheezing and rhonchi present.   Abdominal:      General: Bowel sounds are normal.      Palpations: Abdomen is soft.      Tenderness: There is no abdominal tenderness.   Musculoskeletal:      Cervical back: No rigidity.      Right lower leg: No edema.      Left lower leg: No edema.   Skin:     General: Skin is warm and dry.      Coloration: Skin is not jaundiced.   Neurological:      Mental Status: She is alert and oriented to person, place, and time. Mental status is at baseline.   Psychiatric:         Mood and Affect: Mood normal.         Behavior: Behavior normal.             Significant Labs: All pertinent labs within the past 24 hours have been reviewed.   Recent Labs    Lab 12/31/24  1328 01/01/25  0505 01/02/25  0542    140 134*   K 3.1* 4.0 4.3    115* 111*   CO2 16* 16* 16*   BUN 34* 29* 29*   CREATININE 1.5* 1.1 0.9   * 153* 142*   ANIONGAP 14 9 7*     Recent Labs   Lab 12/31/24  1328 01/01/25  0505 01/02/25  0542   MG <0.7* 1.3* 2.1   PHOS  --   --  3.2     Recent Labs   Lab 12/31/24  1328 01/02/25  0542   AST 12 11   ALT 10 10   ALKPHOS 45 45   BILITOT 0.3 0.2   ALBUMIN 2.9* 2.3*     POCT Glucose:   Recent Labs   Lab 01/02/25 2049   POCTGLUCOSE 160*    Recent Labs   Lab 12/31/24  1328 01/01/25  0331 01/02/25  0542   WBC 10.06 6.18 12.56   HGB 11.9* 11.1* 10.2*   HCT 37.0 35.3* 33.7*    238 239   GRAN 64.9  6.5 86.8*  5.4 85.7*  10.8*              Significant Imaging:  I have reviewed all pertinent imaging results/findings within the past 24 hours.   CT Head Without Contrast   Final Result      No acute abnormality.      Prior aneurysm clipping.      All CT scans at this facility are performed  using dose modulation techniques as appropriate to performed exam including the following:  automated exposure control; adjustment of mA and/or kV according to the patients size (this includes techniques or standardized protocols for targeted exams where dose is matched to indication/reason for exam: i.e. extremities or head);  iterative reconstruction technique.         Electronically signed by: Shane Jones   Date:    01/01/2025   Time:    20:27      CT Chest Without Contrast   Final Result      Stable suspected pulmonary fibrosis of the lungs.  No acute infiltrates.         Electronically signed by: Tommy Maravilla MD   Date:    12/31/2024   Time:    13:59          Inpatient Medications:  Continuous Infusions:    Scheduled Meds:   ascorbic acid (vitamin C)  500 mg Oral BID    atorvastatin  40 mg Oral Daily    clopidogreL  75 mg Oral Daily    dextromethorphan-guaiFENesin  mg/5 ml  10 mL Oral Q6H    enoxparin  1 mg/kg Subcutaneous BID     EScitalopram oxalate  20 mg Oral Daily    fluticasone furoate-vilanteroL  1 puff Inhalation Daily    gabapentin  200 mg Oral TID    ipratropium-albuteroL  1 puff Inhalation Q6H    isosorbide mononitrate  120 mg Oral QHS    levETIRAcetam  500 mg Oral BID    methylPREDNISolone injection (PEDS and ADULTS)  60 mg Intravenous Q8H    metoprolol tartrate  50 mg Oral BID    montelukast  10 mg Oral Daily    multivitamin  1 tablet Oral Daily    pantoprazole  40 mg Oral BID    remdesivir infusion  100 mg Intravenous Daily    roflumilast  1 tablet Oral Daily    sacubitriL-valsartan  1 tablet Oral BID    sodium bicarbonate  650 mg Oral TID       PRN Meds:  Current Facility-Administered Medications:     acetaminophen, 650 mg, Oral, Q6H PRN    dextrose 50%, 12.5 g, Intravenous, PRN    dextrose 50%, 25 g, Intravenous, PRN    glucagon (human recombinant), 1 mg, Intramuscular, PRN    glucose, 16 g, Oral, PRN    glucose, 24 g, Oral, PRN    HYDROcodone-acetaminophen, 1 tablet, Oral, Q6H PRN    hydrOXYzine HCL, 25 mg, Oral, TID PRN    melatonin, 6 mg, Oral, Nightly PRN    ondansetron, 4 mg, Intravenous, Q8H PRN    sodium chloride 0.9%, 10 mL, Intravenous, PRN          Assessment and Plan     * COVID-19  Patient is identified as Severe COVID-19 based on hypoxemia with O2 saturations <94% on room air or on ambulation   Initiate standard COVID protocols; COVID-19 testing ,Infection Control notification  and isolation- respiratory, contact and droplet per protocol    Diagnostics: CBC, CMP, Ferritin, CRP, Troponin, and Portable CXR    Management: Initiate targeted therapy with Remdesivir, 200mg IV x1, followed by 100mg IV daily x5 days total and Anticoagulation, Patient admitted to non-critical care unit- Will initiate full dose anticoagulation with Weight based lovenox 1mg/kg IV q12, Maintain oxygen saturations 92-96% via Nasal Cannula  LPM and monitor with continuous/intermittent pulse oximetry. , Inhaled bronchodilators as needed for  shortness of breath., and Continuous cardiac monitoring.    Advance Care Planning Current advance care plan has been discussed with patient/family/POA and patient currently wishes Full Code.     Hypokalemia  Patient's most recent potassium results are listed below.   Recent Labs     12/31/24  1328 01/01/25  0505 01/02/25  0542   K 3.1* 4.0 4.3       Plan  - Replete potassium per protocol  - Monitor potassium Daily  - Patient's hypokalemia is  being treated. Received KCL 40 mEq PO x 1 in ED    CKD (chronic kidney disease)  Creatine stable for now. BMP reviewed- noted Estimated Creatinine Clearance: 42.8 mL/min (based on SCr of 0.9 mg/dL). according to latest data. Based on current GFR, CKD stage is stage 4 - GFR 15-29.  Monitor UOP and serial BMP and adjust therapy as needed. Renally dose meds. Avoid nephrotoxic medications and procedures.  -Creatinine 1.5 on admission however 3 months ago was 1.2    Recent Labs     12/31/24  1328 01/01/25  0505 01/02/25  0542   CREATININE 1.5* 1.1 0.9       -Due to GFR, will need to dc ranexa for now and decrease dose of gabapentin  -Hold spironolactone   -Continue Entresto   -Monitor creatinine    Chronic diastolic heart failure  Patient has Diastolic (HFpEF) heart failure that is Chronic. On presentation their CHF was well compensated. Most recent BNP and echo results are listed below.  Recent Labs     12/31/24  1328   BNP 92       Latest ECHO  Results for orders placed during the hospital encounter of 06/02/23    Echo    Interpretation Summary  · The left ventricle is normal in size with concentric hypertrophy and low normal systolic function.  · The estimated ejection fraction is 50%.  · Normal left ventricular diastolic function.  · There is abnormal septal wall motion consistent with left bundle branch block.  · Normal right ventricular size with normal right ventricular systolic function.  · Normal central venous pressure (3 mmHg).  · The estimated PA systolic pressure is 30  mmHg.  · Mild mitral regurgitation.    Current Heart Failure Medications  sacubitriL-valsartan 24-26 mg per tablet 1 tablet, 2 times daily, Oral    Plan  - Monitor strict I&Os and daily weights.    - Place on telemetry  - Low sodium diet  - Place on fluid restriction of 1.5 L.   - Cardiology has not been consulted  - The patient's volume status is at their baseline           Mixed hyperlipidemia  Lab Results   Component Value Date    CHOL 112 (L) 07/12/2024    HDL 41 07/12/2024    LDLCALC 49.8 (L) 07/12/2024    TRIG 106 07/12/2024     -Continue Statin    Normocytic anemia  Anemia is likely due to chronic disease due to Chronic Kidney Disease. Most recent hemoglobin and hematocrit are listed below.  Recent Labs     12/31/24  1328 01/01/25  0331 01/02/25  0542   HGB 11.9* 11.1* 10.2*   HCT 37.0 35.3* 33.7*       Plan  - Monitor serial CBC: Daily  - Transfuse PRBC if patient becomes hemodynamically unstable, symptomatic or H/H drops below 7/21.  - Patient has not received any PRBC transfusions to date  - Patient's anemia is currently stable    Seizure  -Continue Keppra  -Seizure precautions      Coronary artery disease of native artery of native heart with stable angina pectoris  Patient with known CAD, which is controlled Will continue Plavix and Statin and monitor for S/Sx of angina/ACS. Continue to monitor on telemetry.     GERD (gastroesophageal reflux disease)  -Continue PPI        VTE Risk Mitigation (From admission, onward)           Ordered     enoxaparin injection 50 mg  2 times daily         12/31/24 1518                    Discharge Planning   KATHERINE:      Code Status: Full Code   Medical Readiness for Discharge Date:   Discharge Plan A: Home Health          Jalen Leal DO  Department of Hospital Medicine   [facility] Telemetry ([facility])    Voice recognition software was used in the creation of this note/communication and any sound-alike/typographical errors which may have occurred despite initial review  prior to signing should be taken in context when interpreting.  If such errors prevent a clear understanding of the note/communication, please contact the provider/office for clarification.

## 2025-01-03 NOTE — PLAN OF CARE
A223/A223 OLU Felix is a 79 y.o.female admitted on 12/31/2024 for COVID-19   Code Status: Full Code MRN: [id number]   Review of patient's allergies indicates:   Allergen Reactions    Penicillins Anaphylaxis, Hives, Shortness Of Breath and Swelling    Penicillin     Adhesive Rash    Doxycycline Nausea Only    Oxycodone Other (See Comments)     Fatigue      Sulfa (sulfonamide antibiotics) Itching     Past Medical History:   Diagnosis Date    Abnormal ECG 08/05/2019    Acute bronchitis and bronchiolitis 12/31/2024    Aneurysm     Anticoagulant long-term use     Arthritis     CAD in native artery 08/05/2019    COPD (chronic obstructive pulmonary disease)     Diabetes mellitus     Fall 10/10/2019    Formatting of this note might be different from the original. Found sitting on floor next to bed last night Mild confusion today Does not recall falling or how she ended up on floor UA today Labs yesterday unremarkable    Glaucoma     Hypertension     Seizures     Shingles 05/27/2017    Stroke       PRN meds    acetaminophen, 650 mg, Q6H PRN  dextrose 50%, 12.5 g, PRN  dextrose 50%, 25 g, PRN  glucagon (human recombinant), 1 mg, PRN  glucose, 16 g, PRN  glucose, 24 g, PRN  HYDROcodone-acetaminophen, 1 tablet, Q6H PRN  hydrOXYzine HCL, 25 mg, TID PRN  melatonin, 6 mg, Nightly PRN  ondansetron, 4 mg, Q8H PRN  sodium chloride 0.9%, 10 mL, PRN      Chart check completed. Will continue plan of care.         Anton Coma Scale Score: 15     Lead Monitored: Lead II Rhythm: normal sinus rhythm    Cardiac/Telemetry Box Number: ICU 13  VTE Core Measure: (SCDs) Sequential compression device initiated/maintained Last Bowel Movement: 01/01/25  Diet Cardiac Standard Tray  Voiding Characteristics: external catheter  Gilberto Score: 19  Fall Risk Score: 19  Accucheck [x]   Freq?      Lines/Drains/Airways       Drain  Duration             Female External Urinary Catheter w/ Suction 12/31/24 2230 2 days              Peripheral  Intravenous Line  Duration                  Peripheral IV - Single Lumen 12/31/24 1320 20 G Anterior;Distal;Right Forearm 2 days         Peripheral IV - Single Lumen 12/31/24 1715 20 G 1 in Anterior;Left Forearm 2 days

## 2025-01-03 NOTE — PLAN OF CARE
PT EVAL complete. Required CGA for bed mobility, ambulated 4 steps MIN A of 2 HHA to transfer. Recommending moderate intensity therapy upon d/c.

## 2025-01-04 LAB
ALBUMIN SERPL BCP-MCNC: 2.3 G/DL (ref 3.5–5.2)
ALP SERPL-CCNC: 43 U/L (ref 40–150)
ALT SERPL W/O P-5'-P-CCNC: 13 U/L (ref 10–44)
ANION GAP SERPL CALC-SCNC: 9 MMOL/L (ref 8–16)
AST SERPL-CCNC: 17 U/L (ref 10–40)
BASOPHILS # BLD AUTO: 0.03 K/UL (ref 0–0.2)
BASOPHILS NFR BLD: 0.3 % (ref 0–1.9)
BILIRUB SERPL-MCNC: 0.2 MG/DL (ref 0.1–1)
BILIRUB UR QL STRIP: NEGATIVE
BUN SERPL-MCNC: 29 MG/DL (ref 8–23)
CALCIUM SERPL-MCNC: 8.5 MG/DL (ref 8.7–10.5)
CHLORIDE SERPL-SCNC: 110 MMOL/L (ref 95–110)
CLARITY UR: CLEAR
CO2 SERPL-SCNC: 17 MMOL/L (ref 23–29)
COLOR UR: YELLOW
CREAT SERPL-MCNC: 0.9 MG/DL (ref 0.5–1.4)
DIFFERENTIAL METHOD BLD: ABNORMAL
EOSINOPHIL # BLD AUTO: 0 K/UL (ref 0–0.5)
EOSINOPHIL NFR BLD: 0 % (ref 0–8)
ERYTHROCYTE [DISTWIDTH] IN BLOOD BY AUTOMATED COUNT: 14.3 % (ref 11.5–14.5)
EST. GFR  (NO RACE VARIABLE): >60 ML/MIN/1.73 M^2
GLUCOSE SERPL-MCNC: 158 MG/DL (ref 70–110)
GLUCOSE UR QL STRIP: NEGATIVE
HCT VFR BLD AUTO: 38.1 % (ref 37–48.5)
HGB BLD-MCNC: 11.6 G/DL (ref 12–16)
HGB UR QL STRIP: NEGATIVE
IMM GRANULOCYTES # BLD AUTO: 0.26 K/UL (ref 0–0.04)
IMM GRANULOCYTES NFR BLD AUTO: 2.8 % (ref 0–0.5)
KETONES UR QL STRIP: NEGATIVE
LEUKOCYTE ESTERASE UR QL STRIP: NEGATIVE
LYMPHOCYTES # BLD AUTO: 0.9 K/UL (ref 1–4.8)
LYMPHOCYTES NFR BLD: 9.8 % (ref 18–48)
MAGNESIUM SERPL-MCNC: 1.8 MG/DL (ref 1.6–2.6)
MCH RBC QN AUTO: 31 PG (ref 27–31)
MCHC RBC AUTO-ENTMCNC: 30.4 G/DL (ref 32–36)
MCV RBC AUTO: 102 FL (ref 82–98)
MONOCYTES # BLD AUTO: 0.4 K/UL (ref 0.3–1)
MONOCYTES NFR BLD: 4.1 % (ref 4–15)
NEUTROPHILS # BLD AUTO: 7.6 K/UL (ref 1.8–7.7)
NEUTROPHILS NFR BLD: 83 % (ref 38–73)
NITRITE UR QL STRIP: NEGATIVE
NRBC BLD-RTO: 0 /100 WBC
PH UR STRIP: 7 [PH] (ref 5–8)
PHOSPHATE SERPL-MCNC: 2.6 MG/DL (ref 2.7–4.5)
PLATELET # BLD AUTO: 197 K/UL (ref 150–450)
PMV BLD AUTO: 10 FL (ref 9.2–12.9)
POTASSIUM SERPL-SCNC: 4.5 MMOL/L (ref 3.5–5.1)
PROT SERPL-MCNC: 5.4 G/DL (ref 6–8.4)
PROT UR QL STRIP: NEGATIVE
RBC # BLD AUTO: 3.74 M/UL (ref 4–5.4)
SODIUM SERPL-SCNC: 136 MMOL/L (ref 136–145)
SP GR UR STRIP: 1.01 (ref 1–1.03)
URN SPEC COLLECT METH UR: NORMAL
UROBILINOGEN UR STRIP-ACNC: NEGATIVE EU/DL
WBC # BLD AUTO: 9.19 K/UL (ref 3.9–12.7)

## 2025-01-04 PROCEDURE — 36415 COLL VENOUS BLD VENIPUNCTURE: CPT | Performed by: STUDENT IN AN ORGANIZED HEALTH CARE EDUCATION/TRAINING PROGRAM

## 2025-01-04 PROCEDURE — 80053 COMPREHEN METABOLIC PANEL: CPT | Performed by: STUDENT IN AN ORGANIZED HEALTH CARE EDUCATION/TRAINING PROGRAM

## 2025-01-04 PROCEDURE — 25000003 PHARM REV CODE 250: Performed by: NURSE PRACTITIONER

## 2025-01-04 PROCEDURE — 21400001 HC TELEMETRY ROOM

## 2025-01-04 PROCEDURE — 27000221 HC OXYGEN, UP TO 24 HOURS

## 2025-01-04 PROCEDURE — 94640 AIRWAY INHALATION TREATMENT: CPT

## 2025-01-04 PROCEDURE — 83735 ASSAY OF MAGNESIUM: CPT | Performed by: STUDENT IN AN ORGANIZED HEALTH CARE EDUCATION/TRAINING PROGRAM

## 2025-01-04 PROCEDURE — 25000003 PHARM REV CODE 250: Performed by: STUDENT IN AN ORGANIZED HEALTH CARE EDUCATION/TRAINING PROGRAM

## 2025-01-04 PROCEDURE — 63600175 PHARM REV CODE 636 W HCPCS: Mod: JZ,TB | Performed by: NURSE PRACTITIONER

## 2025-01-04 PROCEDURE — 99900035 HC TECH TIME PER 15 MIN (STAT)

## 2025-01-04 PROCEDURE — 25000242 PHARM REV CODE 250 ALT 637 W/ HCPCS: Performed by: STUDENT IN AN ORGANIZED HEALTH CARE EDUCATION/TRAINING PROGRAM

## 2025-01-04 PROCEDURE — 84100 ASSAY OF PHOSPHORUS: CPT | Performed by: STUDENT IN AN ORGANIZED HEALTH CARE EDUCATION/TRAINING PROGRAM

## 2025-01-04 PROCEDURE — 85025 COMPLETE CBC W/AUTO DIFF WBC: CPT | Performed by: STUDENT IN AN ORGANIZED HEALTH CARE EDUCATION/TRAINING PROGRAM

## 2025-01-04 PROCEDURE — 63600175 PHARM REV CODE 636 W HCPCS: Performed by: STUDENT IN AN ORGANIZED HEALTH CARE EDUCATION/TRAINING PROGRAM

## 2025-01-04 PROCEDURE — 81003 URINALYSIS AUTO W/O SCOPE: CPT | Performed by: STUDENT IN AN ORGANIZED HEALTH CARE EDUCATION/TRAINING PROGRAM

## 2025-01-04 PROCEDURE — 27000207 HC ISOLATION

## 2025-01-04 PROCEDURE — 25000003 PHARM REV CODE 250: Performed by: FAMILY MEDICINE

## 2025-01-04 PROCEDURE — 94761 N-INVAS EAR/PLS OXIMETRY MLT: CPT

## 2025-01-04 RX ORDER — INSULIN ASPART 100 [IU]/ML
0-10 INJECTION, SOLUTION INTRAVENOUS; SUBCUTANEOUS
Status: DISCONTINUED | OUTPATIENT
Start: 2025-01-04 | End: 2025-01-04

## 2025-01-04 RX ORDER — IBUPROFEN 200 MG
24 TABLET ORAL
Status: DISCONTINUED | OUTPATIENT
Start: 2025-01-04 | End: 2025-01-04

## 2025-01-04 RX ORDER — MAGNESIUM SULFATE HEPTAHYDRATE 40 MG/ML
2 INJECTION, SOLUTION INTRAVENOUS ONCE
Status: COMPLETED | OUTPATIENT
Start: 2025-01-04 | End: 2025-01-04

## 2025-01-04 RX ORDER — ACETYLCYSTEINE 100 MG/ML
4 SOLUTION ORAL; RESPIRATORY (INHALATION) ONCE
Status: COMPLETED | OUTPATIENT
Start: 2025-01-04 | End: 2025-01-04

## 2025-01-04 RX ORDER — SODIUM,POTASSIUM PHOSPHATES 280-250MG
2 POWDER IN PACKET (EA) ORAL EVERY 4 HOURS
Status: COMPLETED | OUTPATIENT
Start: 2025-01-04 | End: 2025-01-04

## 2025-01-04 RX ORDER — GLUCAGON 1 MG
1 KIT INJECTION
Status: DISCONTINUED | OUTPATIENT
Start: 2025-01-04 | End: 2025-01-04

## 2025-01-04 RX ORDER — IBUPROFEN 200 MG
16 TABLET ORAL
Status: DISCONTINUED | OUTPATIENT
Start: 2025-01-04 | End: 2025-01-04

## 2025-01-04 RX ADMIN — ACETAMINOPHEN 650 MG: 325 TABLET ORAL at 05:01

## 2025-01-04 RX ADMIN — SODIUM BICARBONATE 650 MG TABLET 1300 MG: at 08:01

## 2025-01-04 RX ADMIN — SODIUM BICARBONATE 650 MG TABLET 1300 MG: at 09:01

## 2025-01-04 RX ADMIN — GUAIFENESIN 600 MG: 600 TABLET, EXTENDED RELEASE ORAL at 08:01

## 2025-01-04 RX ADMIN — REMDESIVIR 100 MG: 100 INJECTION, POWDER, LYOPHILIZED, FOR SOLUTION INTRAVENOUS at 10:01

## 2025-01-04 RX ADMIN — ENOXAPARIN SODIUM 50 MG: 60 INJECTION SUBCUTANEOUS at 09:01

## 2025-01-04 RX ADMIN — GABAPENTIN 200 MG: 100 CAPSULE ORAL at 09:01

## 2025-01-04 RX ADMIN — Medication 2 PACKET: at 09:01

## 2025-01-04 RX ADMIN — ROFLUMILAST 500 MCG: 500 TABLET ORAL at 09:01

## 2025-01-04 RX ADMIN — LEVETIRACETAM 500 MG: 500 TABLET, FILM COATED ORAL at 09:01

## 2025-01-04 RX ADMIN — OXYCODONE HYDROCHLORIDE AND ACETAMINOPHEN 500 MG: 500 TABLET ORAL at 08:01

## 2025-01-04 RX ADMIN — Medication 2 PACKET: at 03:01

## 2025-01-04 RX ADMIN — ACETYLCYSTEINE 4 ML: 100 INHALANT RESPIRATORY (INHALATION) at 09:01

## 2025-01-04 RX ADMIN — METOPROLOL TARTRATE 50 MG: 50 TABLET, FILM COATED ORAL at 09:01

## 2025-01-04 RX ADMIN — THERA TABS 1 TABLET: TAB at 09:01

## 2025-01-04 RX ADMIN — ESCITALOPRAM OXALATE 20 MG: 10 TABLET, FILM COATED ORAL at 09:01

## 2025-01-04 RX ADMIN — PANTOPRAZOLE SODIUM 40 MG: 40 TABLET, DELAYED RELEASE ORAL at 09:01

## 2025-01-04 RX ADMIN — GUAIFENESIN 600 MG: 600 TABLET, EXTENDED RELEASE ORAL at 09:01

## 2025-01-04 RX ADMIN — CLOPIDOGREL BISULFATE 75 MG: 75 TABLET ORAL at 09:01

## 2025-01-04 RX ADMIN — IPRATROPIUM BROMIDE AND ALBUTEROL 1 PUFF: 20; 100 SPRAY, METERED RESPIRATORY (INHALATION) at 07:01

## 2025-01-04 RX ADMIN — IPRATROPIUM BROMIDE AND ALBUTEROL 1 PUFF: 20; 100 SPRAY, METERED RESPIRATORY (INHALATION) at 01:01

## 2025-01-04 RX ADMIN — IPRATROPIUM BROMIDE AND ALBUTEROL 1 PUFF: 20; 100 SPRAY, METERED RESPIRATORY (INHALATION) at 12:01

## 2025-01-04 RX ADMIN — ATORVASTATIN CALCIUM 40 MG: 40 TABLET, FILM COATED ORAL at 09:01

## 2025-01-04 RX ADMIN — PANTOPRAZOLE SODIUM 40 MG: 40 TABLET, DELAYED RELEASE ORAL at 08:01

## 2025-01-04 RX ADMIN — GABAPENTIN 200 MG: 100 CAPSULE ORAL at 08:01

## 2025-01-04 RX ADMIN — FLUTICASONE FUROATE AND VILANTEROL TRIFENATATE 1 PUFF: 100; 25 POWDER RESPIRATORY (INHALATION) at 07:01

## 2025-01-04 RX ADMIN — MONTELUKAST 10 MG: 10 TABLET, FILM COATED ORAL at 09:01

## 2025-01-04 RX ADMIN — GABAPENTIN 200 MG: 100 CAPSULE ORAL at 03:01

## 2025-01-04 RX ADMIN — MAGNESIUM SULFATE HEPTAHYDRATE 2 G: 40 INJECTION, SOLUTION INTRAVENOUS at 10:01

## 2025-01-04 RX ADMIN — OXYCODONE HYDROCHLORIDE AND ACETAMINOPHEN 500 MG: 500 TABLET ORAL at 09:01

## 2025-01-04 RX ADMIN — METHYLPREDNISOLONE SODIUM SUCCINATE 60 MG: 40 INJECTION, POWDER, FOR SOLUTION INTRAMUSCULAR; INTRAVENOUS at 09:01

## 2025-01-04 RX ADMIN — ISOSORBIDE MONONITRATE 120 MG: 60 TABLET, EXTENDED RELEASE ORAL at 08:01

## 2025-01-04 RX ADMIN — METOPROLOL TARTRATE 50 MG: 50 TABLET, FILM COATED ORAL at 08:01

## 2025-01-04 RX ADMIN — SACUBITRIL AND VALSARTAN 1 TABLET: 24; 26 TABLET, FILM COATED ORAL at 09:01

## 2025-01-04 RX ADMIN — METHYLPREDNISOLONE SODIUM SUCCINATE 60 MG: 40 INJECTION, POWDER, FOR SOLUTION INTRAMUSCULAR; INTRAVENOUS at 05:01

## 2025-01-04 RX ADMIN — SODIUM BICARBONATE 650 MG TABLET 1300 MG: at 03:01

## 2025-01-04 RX ADMIN — LEVETIRACETAM 500 MG: 500 TABLET, FILM COATED ORAL at 08:01

## 2025-01-04 RX ADMIN — METHYLPREDNISOLONE SODIUM SUCCINATE 60 MG: 40 INJECTION, POWDER, FOR SOLUTION INTRAMUSCULAR; INTRAVENOUS at 03:01

## 2025-01-04 RX ADMIN — SACUBITRIL AND VALSARTAN 1 TABLET: 24; 26 TABLET, FILM COATED ORAL at 08:01

## 2025-01-04 NOTE — PROGRESS NOTES
'[facility] Medicine  Progress Note    Patient Name: Shireen Felix  MRN: [id number]  Patient Class: IP- Inpatient   Admission Date: 12/31/2024  Length of Stay: 2 days  Attending Physician: Jalen Leal DO  Primary Care Provider: Laron Chaudhari MD        Subjective     Principal Problem:COVID-19        HPI:  Shireen Felix is a 79 year old female who has  has a past medical history of Abnormal ECG, Acute bronchitis and bronchiolitis, Aneurysm, Anticoagulant long-term use, Arthritis, CAD in native artery, COPD (chronic obstructive pulmonary disease), Diabetes mellitus, Fall, Glaucoma, Hypertension, Seizures, Shingles, and Stroke who presented to Emergency Department for evaluation for cough. Associated symptoms include: congestion, wheezing, and SOB. She was seen by her pulmonologist today, Dr. Sam. Started as sinus pain and drainage and now has progressed to respiratory symptoms. She was seen a few days ago in the ER given prescription for antibiotics steroids but these have not helped much. Today in the office she can barely talk without coughing continually. She is having a difficult time putting more than 3 words together without severe cough paroxysms. ED workup showed Hgb/Hct 11.9/37.0, K+ 3.1, CO2 16, BUN/creatinine 34/1.5. CT chest w/out contrast stable suspected pulmonary fibrosis of the lungs. No acute infiltrates. She has received DuoNeb x 1, solumedrol 125 mg IV x 1, KCL 40 mEq PO x 1 and NS 1L bolus. Pt admitted for COVID.     Overview/Hospital Course:  Admitted to Hospital Medicine for acute on chronic respiratory failure concerns in setting of COVID-19.  Started on IV methylprednisolone, remdesivir, scheduled respiratory treatments.    Interval History:  No acute events overnight, afebrile, hemodynamically stable.  Still reports significant respiratory symptoms with persistent cough, dyspnea, however does reports some improvements. Reports chest  congestion, will trial Mucormust    Objective:     Vital Signs (Most Recent):  Temp: 97.8 °F (36.6 °C) (01/03/25 1914)  Pulse: 70 (01/03/25 2032)  Resp: 18 (01/03/25 2032)  BP: (!) 150/68 (01/03/25 2055)  SpO2: 100 % (01/03/25 2032) Vital Signs (24h Range):  Temp:  [97.5 °F (36.4 °C)-97.9 °F (36.6 °C)] 97.8 °F (36.6 °C)  Pulse:  [62-76] 70  Resp:  [16-20] 18  SpO2:  [98 %-100 %] 100 %  BP: (115-153)/(50-76) 150/68     Weight: 54.4 kg (119 lb 14.9 oz)  Body mass index is 20.59 kg/m².    Intake/Output Summary (Last 24 hours) at 1/3/2025 2316  Last data filed at 1/3/2025 0929  Gross per 24 hour   Intake 240 ml   Output 600 ml   Net -360 ml         Physical Exam  Vitals and nursing note reviewed.   Constitutional:       General: She is not in acute distress.     Appearance: She is not ill-appearing, toxic-appearing or diaphoretic.   HENT:      Head: Normocephalic and atraumatic.      Mouth/Throat:      Mouth: Mucous membranes are moist.   Eyes:      General: No scleral icterus.        Right eye: No discharge.         Left eye: No discharge.   Cardiovascular:      Rate and Rhythm: Normal rate and regular rhythm.      Heart sounds: Normal heart sounds.   Pulmonary:      Effort: Pulmonary effort is normal. No respiratory distress.      Breath sounds: Decreased breath sounds, wheezing and rhonchi present.   Abdominal:      General: Bowel sounds are normal.      Palpations: Abdomen is soft.      Tenderness: There is no abdominal tenderness.   Musculoskeletal:      Cervical back: No rigidity.      Right lower leg: No edema.      Left lower leg: No edema.   Skin:     General: Skin is warm and dry.      Coloration: Skin is not jaundiced.   Neurological:      Mental Status: She is alert and oriented to person, place, and time. Mental status is at baseline.   Psychiatric:         Mood and Affect: Mood normal.         Behavior: Behavior normal.             Significant Labs: All pertinent labs within the past 24 hours have been  reviewed.   Recent Labs   Lab 01/01/25  0505 01/02/25  0542 01/03/25  0510    134* 136   K 4.0 4.3 4.2   * 111* 112*   CO2 16* 16* 18*   BUN 29* 29* 28*   CREATININE 1.1 0.9 0.9   * 142* 132*   ANIONGAP 9 7* 6*     Recent Labs   Lab 01/01/25  0505 01/02/25  0542 01/03/25  0510   MG 1.3* 2.1 2.0   PHOS  --  3.2 2.6*     Recent Labs   Lab 12/31/24  1328 01/02/25  0542 01/03/25  0510   AST 12 11 10   ALT 10 10 9*   ALKPHOS 45 45 50   BILITOT 0.3 0.2 0.2   ALBUMIN 2.9* 2.3* 2.5*     POCT Glucose:   Recent Labs   Lab 01/02/25  2049 01/03/25  0618   POCTGLUCOSE 160* 119*    Recent Labs   Lab 01/01/25  0331 01/02/25  0542 01/03/25  0510   WBC 6.18 12.56 10.83   HGB 11.1* 10.2* 12.0   HCT 35.3* 33.7* 39.0    239 247   GRAN 86.8*  5.4 85.7*  10.8* 86.9*  9.4*              Significant Imaging:  I have reviewed all pertinent imaging results/findings within the past 24 hours.   X-Ray Chest AP Portable   Final Result      No acute abnormality.         Electronically signed by: Roland Ken   Date:    01/03/2025   Time:    20:43      CT Head Without Contrast   Final Result      No acute abnormality.      Prior aneurysm clipping.      All CT scans at this facility are performed  using dose modulation techniques as appropriate to performed exam including the following:  automated exposure control; adjustment of mA and/or kV according to the patients size (this includes techniques or standardized protocols for targeted exams where dose is matched to indication/reason for exam: i.e. extremities or head);  iterative reconstruction technique.         Electronically signed by: Shane Jones   Date:    01/01/2025   Time:    20:27      CT Chest Without Contrast   Final Result      Stable suspected pulmonary fibrosis of the lungs.  No acute infiltrates.         Electronically signed by: Tommy Maravilla MD   Date:    12/31/2024   Time:    13:59          Inpatient Medications:  Continuous Infusions:    Scheduled  Meds:   ascorbic acid (vitamin C)  500 mg Oral BID    atorvastatin  40 mg Oral Daily    clopidogreL  75 mg Oral Daily    enoxparin  1 mg/kg Subcutaneous BID    EScitalopram oxalate  20 mg Oral Daily    fluticasone furoate-vilanteroL  1 puff Inhalation Daily    gabapentin  200 mg Oral TID    guaiFENesin  600 mg Oral BID    ipratropium-albuteroL  1 puff Inhalation Q6H    isosorbide mononitrate  120 mg Oral QHS    levETIRAcetam  500 mg Oral BID    methylPREDNISolone injection (PEDS and ADULTS)  60 mg Intravenous Q8H    metoprolol tartrate  50 mg Oral BID    montelukast  10 mg Oral Daily    multivitamin  1 tablet Oral Daily    pantoprazole  40 mg Oral BID    remdesivir infusion  100 mg Intravenous Daily    roflumilast  1 tablet Oral Daily    sacubitriL-valsartan  1 tablet Oral BID    sodium bicarbonate  650 mg Oral TID       PRN Meds:  Current Facility-Administered Medications:     acetaminophen, 650 mg, Oral, Q6H PRN    dextrose 50%, 12.5 g, Intravenous, PRN    dextrose 50%, 25 g, Intravenous, PRN    glucagon (human recombinant), 1 mg, Intramuscular, PRN    glucose, 16 g, Oral, PRN    glucose, 24 g, Oral, PRN    HYDROcodone-acetaminophen, 1 tablet, Oral, Q6H PRN    hydrOXYzine HCL, 25 mg, Oral, TID PRN    melatonin, 6 mg, Oral, Nightly PRN    ondansetron, 4 mg, Intravenous, Q8H PRN    sodium chloride 0.9%, 10 mL, Intravenous, PRN      Assessment and Plan     * COVID-19  Patient is identified as Severe COVID-19 based on hypoxemia with O2 saturations <94% on room air or on ambulation   Initiate standard COVID protocols; COVID-19 testing ,Infection Control notification  and isolation- respiratory, contact and droplet per protocol    Diagnostics: CBC, CMP, Ferritin, CRP, Troponin, and Portable CXR    Management: Initiate targeted therapy with Remdesivir, 200mg IV x1, followed by 100mg IV daily x5 days total and Anticoagulation, Patient admitted to non-critical care unit- Will initiate full dose anticoagulation with Weight  "based lovenox 1mg/kg IV q12, Maintain oxygen saturations 92-96% via Nasal Cannula  LPM and monitor with continuous/intermittent pulse oximetry. , Inhaled bronchodilators as needed for shortness of breath., and Continuous cardiac monitoring.    Advance Care Planning Current advance care plan has been discussed with patient/family/POA and patient currently wishes Full Code.     Hypokalemia  Patient's most recent potassium results are listed below.   Recent Labs     01/01/25  0505 01/02/25  0542 01/03/25  0510   K 4.0 4.3 4.2       Plan  - Replete potassium per protocol  - Monitor potassium Daily  - Patient's hypokalemia is  being treated. Received KCL 40 mEq PO x 1 in ED    CKD (chronic kidney disease)  Creatine stable for now. BMP reviewed- noted Estimated Creatinine Clearance: 42.8 mL/min (based on SCr of 0.9 mg/dL). according to latest data. Based on current GFR, CKD stage is stage 4 - GFR 15-29.  Monitor UOP and serial BMP and adjust therapy as needed. Renally dose meds. Avoid nephrotoxic medications and procedures.  -Creatinine 1.5 on admission however 3 months ago was 1.2    Recent Labs     01/01/25  0505 01/02/25  0542 01/03/25  0510   CREATININE 1.1 0.9 0.9         -Due to GFR, will need to dc ranexa for now and decrease dose of gabapentin  -Hold spironolactone   -Continue Entresto   -Monitor creatinine    Chronic diastolic heart failure  Patient has Diastolic (HFpEF) heart failure that is Chronic. On presentation their CHF was well compensated. Most recent BNP and echo results are listed below.  No results for input(s): "BNP" in the last 72 hours.    Latest ECHO  Results for orders placed during the hospital encounter of 06/02/23    Echo    Interpretation Summary  · The left ventricle is normal in size with concentric hypertrophy and low normal systolic function.  · The estimated ejection fraction is 50%.  · Normal left ventricular diastolic function.  · There is abnormal septal wall motion consistent with " left bundle branch block.  · Normal right ventricular size with normal right ventricular systolic function.  · Normal central venous pressure (3 mmHg).  · The estimated PA systolic pressure is 30 mmHg.  · Mild mitral regurgitation.    Current Heart Failure Medications  sacubitriL-valsartan 24-26 mg per tablet 1 tablet, 2 times daily, Oral    Plan  - Monitor strict I&Os and daily weights.    - Place on telemetry  - Low sodium diet  - Place on fluid restriction of 1.5 L.   - Cardiology has not been consulted  - The patient's volume status is at their baseline           Mixed hyperlipidemia  Lab Results   Component Value Date    CHOL 112 (L) 07/12/2024    HDL 41 07/12/2024    LDLCALC 49.8 (L) 07/12/2024    TRIG 106 07/12/2024     -Continue Statin    Normocytic anemia  Anemia is likely due to chronic disease due to Chronic Kidney Disease. Most recent hemoglobin and hematocrit are listed below.  Recent Labs     01/01/25  0331 01/02/25  0542 01/03/25  0510   HGB 11.1* 10.2* 12.0   HCT 35.3* 33.7* 39.0       Plan  - Monitor serial CBC: Daily  - Transfuse PRBC if patient becomes hemodynamically unstable, symptomatic or H/H drops below 7/21.  - Patient has not received any PRBC transfusions to date  - Patient's anemia is currently stable    Seizure  -Continue Keppra  -Seizure precautions      Coronary artery disease of native artery of native heart with stable angina pectoris  Patient with known CAD, which is controlled Will continue Plavix and Statin and monitor for S/Sx of angina/ACS. Continue to monitor on telemetry.     GERD (gastroesophageal reflux disease)  -Continue PPI        VTE Risk Mitigation (From admission, onward)           Ordered     enoxaparin injection 50 mg  2 times daily         12/31/24 1518                    Discharge Planning   KATHERINE:      Code Status: Full Code   Medical Readiness for Discharge Date:   Discharge Plan A: Skilled Nursing Facility                Jalen Leal DO  Department of  [facility] ([facility])      Voice recognition software was used in the creation of this note/communication and any sound-alike/typographical errors which may have occurred despite initial review prior to signing should be taken in context when interpreting.  If such errors prevent a clear understanding of the note/communication, please contact the provider/office for clarification.

## 2025-01-04 NOTE — ASSESSMENT & PLAN NOTE
Patient's most recent potassium results are listed below.   Recent Labs     01/01/25  0505 01/02/25  0542 01/03/25  0510   K 4.0 4.3 4.2       Plan  - Replete potassium per protocol  - Monitor potassium Daily  - Patient's hypokalemia is  being treated. Received KCL 40 mEq PO x 1 in ED

## 2025-01-04 NOTE — ASSESSMENT & PLAN NOTE
"Patient has Diastolic (HFpEF) heart failure that is Chronic. On presentation their CHF was well compensated. Most recent BNP and echo results are listed below.  No results for input(s): "BNP" in the last 72 hours.    Latest ECHO  Results for orders placed during the hospital encounter of 06/02/23    Echo    Interpretation Summary  · The left ventricle is normal in size with concentric hypertrophy and low normal systolic function.  · The estimated ejection fraction is 50%.  · Normal left ventricular diastolic function.  · There is abnormal septal wall motion consistent with left bundle branch block.  · Normal right ventricular size with normal right ventricular systolic function.  · Normal central venous pressure (3 mmHg).  · The estimated PA systolic pressure is 30 mmHg.  · Mild mitral regurgitation.    Current Heart Failure Medications  sacubitriL-valsartan 24-26 mg per tablet 1 tablet, 2 times daily, Oral    Plan  - Monitor strict I&Os and daily weights.    - Place on telemetry  - Low sodium diet  - Place on fluid restriction of 1.5 L.   - Cardiology has not been consulted  - The patient's volume status is at their baseline         "

## 2025-01-04 NOTE — ASSESSMENT & PLAN NOTE
Creatine stable for now. BMP reviewed- noted Estimated Creatinine Clearance: 42.8 mL/min (based on SCr of 0.9 mg/dL). according to latest data. Based on current GFR, CKD stage is stage 4 - GFR 15-29.  Monitor UOP and serial BMP and adjust therapy as needed. Renally dose meds. Avoid nephrotoxic medications and procedures.  -Creatinine 1.5 on admission however 3 months ago was 1.2    Recent Labs     01/01/25  0505 01/02/25  0542 01/03/25  0510   CREATININE 1.1 0.9 0.9         -Due to GFR, will need to dc ranexa for now and decrease dose of gabapentin  -Hold spironolactone   -Continue Entresto   -Monitor creatinine

## 2025-01-04 NOTE — ASSESSMENT & PLAN NOTE
Anemia is likely due to chronic disease due to Chronic Kidney Disease. Most recent hemoglobin and hematocrit are listed below.  Recent Labs     01/01/25  0331 01/02/25  0542 01/03/25  0510   HGB 11.1* 10.2* 12.0   HCT 35.3* 33.7* 39.0       Plan  - Monitor serial CBC: Daily  - Transfuse PRBC if patient becomes hemodynamically unstable, symptomatic or H/H drops below 7/21.  - Patient has not received any PRBC transfusions to date  - Patient's anemia is currently stable

## 2025-01-04 NOTE — SUBJECTIVE & OBJECTIVE
Interval History:  No acute events overnight, afebrile, hemodynamically stable.  Still reports significant respiratory symptoms with persistent cough, dyspnea, however does reports some improvements. Reports chest congestion, will trial Mucormust    Objective:     Vital Signs (Most Recent):  Temp: 97.8 °F (36.6 °C) (01/03/25 1914)  Pulse: 70 (01/03/25 2032)  Resp: 18 (01/03/25 2032)  BP: (!) 150/68 (01/03/25 2055)  SpO2: 100 % (01/03/25 2032) Vital Signs (24h Range):  Temp:  [97.5 °F (36.4 °C)-97.9 °F (36.6 °C)] 97.8 °F (36.6 °C)  Pulse:  [62-76] 70  Resp:  [16-20] 18  SpO2:  [98 %-100 %] 100 %  BP: (115-153)/(50-76) 150/68     Weight: 54.4 kg (119 lb 14.9 oz)  Body mass index is 20.59 kg/m².    Intake/Output Summary (Last 24 hours) at 1/3/2025 2316  Last data filed at 1/3/2025 0929  Gross per 24 hour   Intake 240 ml   Output 600 ml   Net -360 ml         Physical Exam  Vitals and nursing note reviewed.   Constitutional:       General: She is not in acute distress.     Appearance: She is not ill-appearing, toxic-appearing or diaphoretic.   HENT:      Head: Normocephalic and atraumatic.      Mouth/Throat:      Mouth: Mucous membranes are moist.   Eyes:      General: No scleral icterus.        Right eye: No discharge.         Left eye: No discharge.   Cardiovascular:      Rate and Rhythm: Normal rate and regular rhythm.      Heart sounds: Normal heart sounds.   Pulmonary:      Effort: Pulmonary effort is normal. No respiratory distress.      Breath sounds: Decreased breath sounds, wheezing and rhonchi present.   Abdominal:      General: Bowel sounds are normal.      Palpations: Abdomen is soft.      Tenderness: There is no abdominal tenderness.   Musculoskeletal:      Cervical back: No rigidity.      Right lower leg: No edema.      Left lower leg: No edema.   Skin:     General: Skin is warm and dry.      Coloration: Skin is not jaundiced.   Neurological:      Mental Status: She is alert and oriented to person, place,  and time. Mental status is at baseline.   Psychiatric:         Mood and Affect: Mood normal.         Behavior: Behavior normal.             Significant Labs: All pertinent labs within the past 24 hours have been reviewed.   Recent Labs   Lab 01/01/25  0505 01/02/25  0542 01/03/25  0510    134* 136   K 4.0 4.3 4.2   * 111* 112*   CO2 16* 16* 18*   BUN 29* 29* 28*   CREATININE 1.1 0.9 0.9   * 142* 132*   ANIONGAP 9 7* 6*     Recent Labs   Lab 01/01/25  0505 01/02/25  0542 01/03/25  0510   MG 1.3* 2.1 2.0   PHOS  --  3.2 2.6*     Recent Labs   Lab 12/31/24  1328 01/02/25  0542 01/03/25  0510   AST 12 11 10   ALT 10 10 9*   ALKPHOS 45 45 50   BILITOT 0.3 0.2 0.2   ALBUMIN 2.9* 2.3* 2.5*     POCT Glucose:   Recent Labs   Lab 01/02/25  2049 01/03/25  0618   POCTGLUCOSE 160* 119*    Recent Labs   Lab 01/01/25  0331 01/02/25  0542 01/03/25  0510   WBC 6.18 12.56 10.83   HGB 11.1* 10.2* 12.0   HCT 35.3* 33.7* 39.0    239 247   GRAN 86.8*  5.4 85.7*  10.8* 86.9*  9.4*              Significant Imaging:  I have reviewed all pertinent imaging results/findings within the past 24 hours.   X-Ray Chest AP Portable   Final Result      No acute abnormality.         Electronically signed by: Roland Ken   Date:    01/03/2025   Time:    20:43      CT Head Without Contrast   Final Result      No acute abnormality.      Prior aneurysm clipping.      All CT scans at this facility are performed  using dose modulation techniques as appropriate to performed exam including the following:  automated exposure control; adjustment of mA and/or kV according to the patients size (this includes techniques or standardized protocols for targeted exams where dose is matched to indication/reason for exam: i.e. extremities or head);  iterative reconstruction technique.         Electronically signed by: Shane Jones   Date:    01/01/2025   Time:    20:27      CT Chest Without Contrast   Final Result      Stable suspected  pulmonary fibrosis of the lungs.  No acute infiltrates.         Electronically signed by: Tommy Maravilla MD   Date:    12/31/2024   Time:    13:59          Inpatient Medications:  Continuous Infusions:    Scheduled Meds:   ascorbic acid (vitamin C)  500 mg Oral BID    atorvastatin  40 mg Oral Daily    clopidogreL  75 mg Oral Daily    enoxparin  1 mg/kg Subcutaneous BID    EScitalopram oxalate  20 mg Oral Daily    fluticasone furoate-vilanteroL  1 puff Inhalation Daily    gabapentin  200 mg Oral TID    guaiFENesin  600 mg Oral BID    ipratropium-albuteroL  1 puff Inhalation Q6H    isosorbide mononitrate  120 mg Oral QHS    levETIRAcetam  500 mg Oral BID    methylPREDNISolone injection (PEDS and ADULTS)  60 mg Intravenous Q8H    metoprolol tartrate  50 mg Oral BID    montelukast  10 mg Oral Daily    multivitamin  1 tablet Oral Daily    pantoprazole  40 mg Oral BID    remdesivir infusion  100 mg Intravenous Daily    roflumilast  1 tablet Oral Daily    sacubitriL-valsartan  1 tablet Oral BID    sodium bicarbonate  650 mg Oral TID       PRN Meds:  Current Facility-Administered Medications:     acetaminophen, 650 mg, Oral, Q6H PRN    dextrose 50%, 12.5 g, Intravenous, PRN    dextrose 50%, 25 g, Intravenous, PRN    glucagon (human recombinant), 1 mg, Intramuscular, PRN    glucose, 16 g, Oral, PRN    glucose, 24 g, Oral, PRN    HYDROcodone-acetaminophen, 1 tablet, Oral, Q6H PRN    hydrOXYzine HCL, 25 mg, Oral, TID PRN    melatonin, 6 mg, Oral, Nightly PRN    ondansetron, 4 mg, Intravenous, Q8H PRN    sodium chloride 0.9%, 10 mL, Intravenous, PRN

## 2025-01-05 LAB
ALBUMIN SERPL BCP-MCNC: 2.5 G/DL (ref 3.5–5.2)
ALP SERPL-CCNC: 52 U/L (ref 40–150)
ALT SERPL W/O P-5'-P-CCNC: 22 U/L (ref 10–44)
ANION GAP SERPL CALC-SCNC: 9 MMOL/L (ref 8–16)
AST SERPL-CCNC: 19 U/L (ref 10–40)
BASOPHILS # BLD AUTO: 0.04 K/UL (ref 0–0.2)
BASOPHILS NFR BLD: 0.4 % (ref 0–1.9)
BILIRUB SERPL-MCNC: 0.3 MG/DL (ref 0.1–1)
BUN SERPL-MCNC: 27 MG/DL (ref 8–23)
CALCIUM SERPL-MCNC: 8.4 MG/DL (ref 8.7–10.5)
CHLORIDE SERPL-SCNC: 108 MMOL/L (ref 95–110)
CO2 SERPL-SCNC: 19 MMOL/L (ref 23–29)
CREAT SERPL-MCNC: 0.8 MG/DL (ref 0.5–1.4)
DIFFERENTIAL METHOD BLD: ABNORMAL
EOSINOPHIL # BLD AUTO: 0 K/UL (ref 0–0.5)
EOSINOPHIL NFR BLD: 0 % (ref 0–8)
ERYTHROCYTE [DISTWIDTH] IN BLOOD BY AUTOMATED COUNT: 14.6 % (ref 11.5–14.5)
EST. GFR  (NO RACE VARIABLE): >60 ML/MIN/1.73 M^2
GLUCOSE SERPL-MCNC: 123 MG/DL (ref 70–110)
HCT VFR BLD AUTO: 40.6 % (ref 37–48.5)
HGB BLD-MCNC: 12.3 G/DL (ref 12–16)
IMM GRANULOCYTES # BLD AUTO: 0.3 K/UL (ref 0–0.04)
IMM GRANULOCYTES NFR BLD AUTO: 3 % (ref 0–0.5)
LYMPHOCYTES # BLD AUTO: 1 K/UL (ref 1–4.8)
LYMPHOCYTES NFR BLD: 10.2 % (ref 18–48)
MAGNESIUM SERPL-MCNC: 2.1 MG/DL (ref 1.6–2.6)
MCH RBC QN AUTO: 31 PG (ref 27–31)
MCHC RBC AUTO-ENTMCNC: 30.3 G/DL (ref 32–36)
MCV RBC AUTO: 102 FL (ref 82–98)
MONOCYTES # BLD AUTO: 0.7 K/UL (ref 0.3–1)
MONOCYTES NFR BLD: 7 % (ref 4–15)
NEUTROPHILS # BLD AUTO: 7.9 K/UL (ref 1.8–7.7)
NEUTROPHILS NFR BLD: 79.4 % (ref 38–73)
NRBC BLD-RTO: 0 /100 WBC
OHS QRS DURATION: 132 MS
OHS QTC CALCULATION: 486 MS
PHOSPHATE SERPL-MCNC: 2.9 MG/DL (ref 2.7–4.5)
PLATELET # BLD AUTO: 216 K/UL (ref 150–450)
PMV BLD AUTO: 10.2 FL (ref 9.2–12.9)
POTASSIUM SERPL-SCNC: 4.2 MMOL/L (ref 3.5–5.1)
PROT SERPL-MCNC: 5.6 G/DL (ref 6–8.4)
RBC # BLD AUTO: 3.97 M/UL (ref 4–5.4)
SODIUM SERPL-SCNC: 136 MMOL/L (ref 136–145)
WBC # BLD AUTO: 9.97 K/UL (ref 3.9–12.7)

## 2025-01-05 PROCEDURE — 97110 THERAPEUTIC EXERCISES: CPT | Mod: CQ

## 2025-01-05 PROCEDURE — 63600175 PHARM REV CODE 636 W HCPCS: Performed by: NURSE PRACTITIONER

## 2025-01-05 PROCEDURE — 25000242 PHARM REV CODE 250 ALT 637 W/ HCPCS: Performed by: STUDENT IN AN ORGANIZED HEALTH CARE EDUCATION/TRAINING PROGRAM

## 2025-01-05 PROCEDURE — 21400001 HC TELEMETRY ROOM

## 2025-01-05 PROCEDURE — 63600175 PHARM REV CODE 636 W HCPCS: Mod: JZ | Performed by: STUDENT IN AN ORGANIZED HEALTH CARE EDUCATION/TRAINING PROGRAM

## 2025-01-05 PROCEDURE — 94761 N-INVAS EAR/PLS OXIMETRY MLT: CPT

## 2025-01-05 PROCEDURE — 27000221 HC OXYGEN, UP TO 24 HOURS

## 2025-01-05 PROCEDURE — 80053 COMPREHEN METABOLIC PANEL: CPT | Performed by: STUDENT IN AN ORGANIZED HEALTH CARE EDUCATION/TRAINING PROGRAM

## 2025-01-05 PROCEDURE — 85025 COMPLETE CBC W/AUTO DIFF WBC: CPT | Performed by: STUDENT IN AN ORGANIZED HEALTH CARE EDUCATION/TRAINING PROGRAM

## 2025-01-05 PROCEDURE — 25000003 PHARM REV CODE 250: Performed by: STUDENT IN AN ORGANIZED HEALTH CARE EDUCATION/TRAINING PROGRAM

## 2025-01-05 PROCEDURE — 83735 ASSAY OF MAGNESIUM: CPT | Performed by: STUDENT IN AN ORGANIZED HEALTH CARE EDUCATION/TRAINING PROGRAM

## 2025-01-05 PROCEDURE — 25000003 PHARM REV CODE 250: Performed by: NURSE PRACTITIONER

## 2025-01-05 PROCEDURE — 36415 COLL VENOUS BLD VENIPUNCTURE: CPT | Performed by: STUDENT IN AN ORGANIZED HEALTH CARE EDUCATION/TRAINING PROGRAM

## 2025-01-05 PROCEDURE — 94640 AIRWAY INHALATION TREATMENT: CPT

## 2025-01-05 PROCEDURE — 99900035 HC TECH TIME PER 15 MIN (STAT)

## 2025-01-05 PROCEDURE — 27000207 HC ISOLATION

## 2025-01-05 PROCEDURE — 25000003 PHARM REV CODE 250: Performed by: FAMILY MEDICINE

## 2025-01-05 PROCEDURE — 84100 ASSAY OF PHOSPHORUS: CPT | Performed by: STUDENT IN AN ORGANIZED HEALTH CARE EDUCATION/TRAINING PROGRAM

## 2025-01-05 RX ORDER — METHOCARBAMOL 500 MG/1
500 TABLET, FILM COATED ORAL 3 TIMES DAILY PRN
Status: DISCONTINUED | OUTPATIENT
Start: 2025-01-05 | End: 2025-01-28 | Stop reason: HOSPADM

## 2025-01-05 RX ORDER — ACETYLCYSTEINE 100 MG/ML
4 SOLUTION ORAL; RESPIRATORY (INHALATION) 2 TIMES DAILY
Status: DISCONTINUED | OUTPATIENT
Start: 2025-01-05 | End: 2025-01-06

## 2025-01-05 RX ORDER — FLUTICASONE PROPIONATE 50 MCG
2 SPRAY, SUSPENSION (ML) NASAL DAILY
Status: DISCONTINUED | OUTPATIENT
Start: 2025-01-05 | End: 2025-01-18

## 2025-01-05 RX ADMIN — ACETYLCYSTEINE 4 ML: 100 INHALANT RESPIRATORY (INHALATION) at 07:01

## 2025-01-05 RX ADMIN — PANTOPRAZOLE SODIUM 40 MG: 40 TABLET, DELAYED RELEASE ORAL at 08:01

## 2025-01-05 RX ADMIN — MONTELUKAST 10 MG: 10 TABLET, FILM COATED ORAL at 09:01

## 2025-01-05 RX ADMIN — GABAPENTIN 200 MG: 100 CAPSULE ORAL at 09:01

## 2025-01-05 RX ADMIN — CLOPIDOGREL BISULFATE 75 MG: 75 TABLET ORAL at 09:01

## 2025-01-05 RX ADMIN — GUAIFENESIN 600 MG: 600 TABLET, EXTENDED RELEASE ORAL at 08:01

## 2025-01-05 RX ADMIN — IPRATROPIUM BROMIDE AND ALBUTEROL 1 PUFF: 20; 100 SPRAY, METERED RESPIRATORY (INHALATION) at 07:01

## 2025-01-05 RX ADMIN — FLUTICASONE PROPIONATE 100 MCG: 50 SPRAY, METERED NASAL at 03:01

## 2025-01-05 RX ADMIN — OXYCODONE HYDROCHLORIDE AND ACETAMINOPHEN 500 MG: 500 TABLET ORAL at 08:01

## 2025-01-05 RX ADMIN — THERA TABS 1 TABLET: TAB at 09:01

## 2025-01-05 RX ADMIN — METHYLPREDNISOLONE SODIUM SUCCINATE 60 MG: 40 INJECTION, POWDER, FOR SOLUTION INTRAMUSCULAR; INTRAVENOUS at 05:01

## 2025-01-05 RX ADMIN — SACUBITRIL AND VALSARTAN 1 TABLET: 24; 26 TABLET, FILM COATED ORAL at 08:01

## 2025-01-05 RX ADMIN — IPRATROPIUM BROMIDE AND ALBUTEROL 1 PUFF: 20; 100 SPRAY, METERED RESPIRATORY (INHALATION) at 12:01

## 2025-01-05 RX ADMIN — ISOSORBIDE MONONITRATE 120 MG: 60 TABLET, EXTENDED RELEASE ORAL at 08:01

## 2025-01-05 RX ADMIN — ESCITALOPRAM OXALATE 20 MG: 10 TABLET, FILM COATED ORAL at 09:01

## 2025-01-05 RX ADMIN — HYDROXYZINE HYDROCHLORIDE 25 MG: 25 TABLET ORAL at 12:01

## 2025-01-05 RX ADMIN — SODIUM BICARBONATE 650 MG TABLET 1300 MG: at 03:01

## 2025-01-05 RX ADMIN — ENOXAPARIN SODIUM 50 MG: 60 INJECTION SUBCUTANEOUS at 08:01

## 2025-01-05 RX ADMIN — GABAPENTIN 200 MG: 100 CAPSULE ORAL at 08:01

## 2025-01-05 RX ADMIN — PANTOPRAZOLE SODIUM 40 MG: 40 TABLET, DELAYED RELEASE ORAL at 09:01

## 2025-01-05 RX ADMIN — SACUBITRIL AND VALSARTAN 1 TABLET: 24; 26 TABLET, FILM COATED ORAL at 09:01

## 2025-01-05 RX ADMIN — SODIUM BICARBONATE 650 MG TABLET 1300 MG: at 09:01

## 2025-01-05 RX ADMIN — LEVETIRACETAM 500 MG: 500 TABLET, FILM COATED ORAL at 09:01

## 2025-01-05 RX ADMIN — METOPROLOL TARTRATE 50 MG: 50 TABLET, FILM COATED ORAL at 08:01

## 2025-01-05 RX ADMIN — ROFLUMILAST 500 MCG: 500 TABLET ORAL at 09:01

## 2025-01-05 RX ADMIN — FLUTICASONE FUROATE AND VILANTEROL TRIFENATATE 1 PUFF: 100; 25 POWDER RESPIRATORY (INHALATION) at 07:01

## 2025-01-05 RX ADMIN — ACETYLCYSTEINE 4 ML: 100 INHALANT RESPIRATORY (INHALATION) at 02:01

## 2025-01-05 RX ADMIN — ENOXAPARIN SODIUM 50 MG: 60 INJECTION SUBCUTANEOUS at 09:01

## 2025-01-05 RX ADMIN — ATORVASTATIN CALCIUM 40 MG: 40 TABLET, FILM COATED ORAL at 09:01

## 2025-01-05 RX ADMIN — LEVETIRACETAM 500 MG: 500 TABLET, FILM COATED ORAL at 08:01

## 2025-01-05 RX ADMIN — ACETAMINOPHEN 650 MG: 325 TABLET ORAL at 09:01

## 2025-01-05 RX ADMIN — OXYCODONE HYDROCHLORIDE AND ACETAMINOPHEN 500 MG: 500 TABLET ORAL at 09:01

## 2025-01-05 RX ADMIN — IPRATROPIUM BROMIDE AND ALBUTEROL 1 PUFF: 20; 100 SPRAY, METERED RESPIRATORY (INHALATION) at 01:01

## 2025-01-05 RX ADMIN — METHOCARBAMOL 500 MG: 500 TABLET ORAL at 08:01

## 2025-01-05 RX ADMIN — GUAIFENESIN 600 MG: 600 TABLET, EXTENDED RELEASE ORAL at 09:01

## 2025-01-05 RX ADMIN — METOPROLOL TARTRATE 50 MG: 50 TABLET, FILM COATED ORAL at 09:01

## 2025-01-05 RX ADMIN — GABAPENTIN 200 MG: 100 CAPSULE ORAL at 03:01

## 2025-01-05 RX ADMIN — SODIUM BICARBONATE 650 MG TABLET 1300 MG: at 08:01

## 2025-01-05 NOTE — ASSESSMENT & PLAN NOTE
Patient's most recent potassium results are listed below.   Recent Labs     01/02/25  0542 01/03/25  0510 01/04/25  0440   K 4.3 4.2 4.5       Plan  - Replete potassium per protocol  - Monitor potassium Daily  - Patient's hypokalemia is  being treated. Received KCL 40 mEq PO x 1 in ED

## 2025-01-05 NOTE — PROGRESS NOTES
'[facility] Medicine  Progress Note    Patient Name: Shireen Felix  MRN: [id number]  Patient Class: IP- Inpatient   Admission Date: 12/31/2024  Length of Stay: 4 days  Attending Physician: Jalen Leal DO  Primary Care Provider: Laron Chaudhari MD        Subjective     Principal Problem:COVID-19        HPI:  Shireen Felix is a 79 year old female who has  has a past medical history of Abnormal ECG, Acute bronchitis and bronchiolitis, Aneurysm, Anticoagulant long-term use, Arthritis, CAD in native artery, COPD (chronic obstructive pulmonary disease), Diabetes mellitus, Fall, Glaucoma, Hypertension, Seizures, Shingles, and Stroke who presented to Emergency Department for evaluation for cough. Associated symptoms include: congestion, wheezing, and SOB. She was seen by her pulmonologist today, Dr. Sam. Started as sinus pain and drainage and now has progressed to respiratory symptoms. She was seen a few days ago in the ER given prescription for antibiotics steroids but these have not helped much. Today in the office she can barely talk without coughing continually. She is having a difficult time putting more than 3 words together without severe cough paroxysms. ED workup showed Hgb/Hct 11.9/37.0, K+ 3.1, CO2 16, BUN/creatinine 34/1.5. CT chest w/out contrast stable suspected pulmonary fibrosis of the lungs. No acute infiltrates. She has received DuoNeb x 1, solumedrol 125 mg IV x 1, KCL 40 mEq PO x 1 and NS 1L bolus. Pt admitted for COVID.     Overview/Hospital Course:  Admitted to Hospital Medicine for acute on chronic respiratory failure concerns in setting of COVID-19.  Started on IV methylprednisolone, remdesivir, scheduled respiratory treatments. 01/04: Reported left lower quadrant abdominal pain with tenderness to palpation, will evaluate via CT as patient is on therapeutic anticoagulation for COVID-19.  CT abdomen/pelvis nonacute. 01/05:  Reported improvements  in respiratory status with decreased frequency of cough and dyspnea but still reporting significant symptoms from baseline.  Will trial weaning methylprednisolone dosing.    Interval History:  Reported improvements in respiratory status with decreased frequency of cough and dyspnea but still reporting significant symptoms from baseline.  We will trial weaning methylprednisolone dosing.  Still reporting some chest congestion concerns, but reports Mucomyst treatments seem to be helping, so will schedule Mucomyst treatments b.i.d. CT abdomen/pelvis imaging for abdominal pain was nonacute. Started on methocarbamol for suspected musculoskeletal abdominal pain as there is tenderness to palpation, likely etiology from strain of extensive coughing.       Objective:     Vital Signs (Most Recent):  Temp: 98.4 °F (36.9 °C) (01/05/25 1155)  Pulse: 68 (01/05/25 1427)  Resp: 20 (01/05/25 1427)  BP: (!) 124/59 (01/05/25 1316)  SpO2: 100 % (01/05/25 1427) Vital Signs (24h Range):  Temp:  [97.3 °F (36.3 °C)-98.4 °F (36.9 °C)] 98.4 °F (36.9 °C)  Pulse:  [54-88] 68  Resp:  [18-22] 20  SpO2:  [93 %-100 %] 100 %  BP: (124-177)/(59-76) 124/59     Weight: 55.4 kg (122 lb 2.2 oz)  Body mass index is 20.96 kg/m².    Intake/Output Summary (Last 24 hours) at 1/5/2025 1440  Last data filed at 1/5/2025 0502  Gross per 24 hour   Intake --   Output 1000 ml   Net -1000 ml         Physical Exam  Vitals and nursing note reviewed.   Constitutional:       General: She is not in acute distress.     Appearance: She is ill-appearing. She is not toxic-appearing or diaphoretic.      Interventions: Nasal cannula in place.   HENT:      Head: Normocephalic and atraumatic.      Mouth/Throat:      Mouth: Mucous membranes are moist.   Eyes:      General: No scleral icterus.        Right eye: No discharge.         Left eye: No discharge.   Cardiovascular:      Rate and Rhythm: Normal rate and regular rhythm.      Heart sounds: Normal heart sounds.   Pulmonary:       Effort: Pulmonary effort is normal. No respiratory distress.      Breath sounds: Decreased breath sounds, wheezing and rhonchi present.   Abdominal:      General: Bowel sounds are normal. There is no distension.      Palpations: Abdomen is soft.      Tenderness: There is abdominal tenderness in the left lower quadrant. There is no guarding or rebound.   Musculoskeletal:      Cervical back: No rigidity.      Right lower leg: No edema.      Left lower leg: No edema.   Skin:     General: Skin is warm and dry.      Coloration: Skin is not jaundiced.   Neurological:      Mental Status: She is alert and oriented to person, place, and time. Mental status is at baseline.   Psychiatric:         Mood and Affect: Mood normal.         Behavior: Behavior normal.             Significant Labs: All pertinent labs within the past 24 hours have been reviewed.   Recent Labs   Lab 01/03/25 0510 01/04/25  0440 01/05/25  0531    136 136   K 4.2 4.5 4.2   * 110 108   CO2 18* 17* 19*   BUN 28* 29* 27*   CREATININE 0.9 0.9 0.8   * 158* 123*   ANIONGAP 6* 9 9     Recent Labs   Lab 01/03/25  0510 01/04/25  0440 01/05/25  0531   MG 2.0 1.8 2.1   PHOS 2.6* 2.6* 2.9     Recent Labs   Lab 01/03/25  0510 01/04/25  0440 01/05/25  0531   AST 10 17 19   ALT 9* 13 22   ALKPHOS 50 43 52   BILITOT 0.2 0.2 0.3   ALBUMIN 2.5* 2.3* 2.5*     POCT Glucose:   Recent Labs   Lab 01/02/25 2049 01/03/25  0618   POCTGLUCOSE 160* 119*    Recent Labs   Lab 01/03/25  0510 01/04/25  0440 01/05/25  0531   WBC 10.83 9.19 9.97   HGB 12.0 11.6* 12.3   HCT 39.0 38.1 40.6    197 216   GRAN 86.9*  9.4* 83.0*  7.6 79.4*  7.9*              Significant Imaging:  I have reviewed all pertinent imaging results/findings within the past 24 hours.   CT Abdomen Pelvis  Without Contrast   Final Result      No acute process..  Simple cyst of the bilateral kidneys.  Atherosclerotic disease.  Bilateral hip replacement.  Endplate deformities.  Remaining  findings as above.      All CT scans   are performed using dose optimization techniques including the following: automated exposure control; adjustment of the mA and/or kV; use of iterative reconstruction technique.  Dose modulation was employed for ALARA by means of: Automated exposure control; adjustment of the mA and/or kV according to patient size (this includes techniques or standardized protocols for targeted exams where dose is matched to indication/reason for exam; i.e. extremities or head); and/or use of iterative reconstructive technique.         Electronically signed by: Roland Ken   Date:    01/05/2025   Time:    00:03      X-Ray Chest AP Portable   Final Result      No acute abnormality.         Electronically signed by: Roland Ken   Date:    01/03/2025   Time:    20:43      CT Head Without Contrast   Final Result      No acute abnormality.      Prior aneurysm clipping.      All CT scans at this facility are performed  using dose modulation techniques as appropriate to performed exam including the following:  automated exposure control; adjustment of mA and/or kV according to the patients size (this includes techniques or standardized protocols for targeted exams where dose is matched to indication/reason for exam: i.e. extremities or head);  iterative reconstruction technique.         Electronically signed by: Shane Jones   Date:    01/01/2025   Time:    20:27      CT Chest Without Contrast   Final Result      Stable suspected pulmonary fibrosis of the lungs.  No acute infiltrates.         Electronically signed by: Tommy Maravilla MD   Date:    12/31/2024   Time:    13:59          Inpatient Medications:  Continuous Infusions:    Scheduled Meds:   acetylcysteine 100 mg/ml (10%)  4 mL Nebulization BID    ascorbic acid (vitamin C)  500 mg Oral BID    atorvastatin  40 mg Oral Daily    clopidogreL  75 mg Oral Daily    enoxparin  1 mg/kg Subcutaneous BID    EScitalopram oxalate  20 mg Oral Daily     fluticasone furoate-vilanteroL  1 puff Inhalation Daily    fluticasone propionate  2 spray Each Nostril Daily    gabapentin  200 mg Oral TID    guaiFENesin  600 mg Oral BID    ipratropium-albuteroL  1 puff Inhalation Q6H    isosorbide mononitrate  120 mg Oral QHS    levETIRAcetam  500 mg Oral BID    [START ON 1/6/2025] methylPREDNISolone injection (PEDS and ADULTS)  60 mg Intravenous Q12H    metoprolol tartrate  50 mg Oral BID    montelukast  10 mg Oral Daily    multivitamin  1 tablet Oral Daily    pantoprazole  40 mg Oral BID    roflumilast  1 tablet Oral Daily    sacubitriL-valsartan  1 tablet Oral BID    sodium bicarbonate  1,300 mg Oral TID       PRN Meds:  Current Facility-Administered Medications:     acetaminophen, 650 mg, Oral, Q6H PRN    dextrose 50%, 12.5 g, Intravenous, PRN    dextrose 50%, 25 g, Intravenous, PRN    glucagon (human recombinant), 1 mg, Intramuscular, PRN    glucose, 16 g, Oral, PRN    glucose, 24 g, Oral, PRN    HYDROcodone-acetaminophen, 1 tablet, Oral, Q6H PRN    hydrOXYzine HCL, 25 mg, Oral, TID PRN    melatonin, 6 mg, Oral, Nightly PRN    methocarbamoL, 500 mg, Oral, TID PRN    ondansetron, 4 mg, Intravenous, Q8H PRN    sodium chloride 0.9%, 10 mL, Intravenous, PRN      Assessment and Plan     * COVID-19  Patient is identified as Severe COVID-19 based on hypoxemia with O2 saturations <94% on room air or on ambulation   Initiate standard COVID protocols; COVID-19 testing ,Infection Control notification  and isolation- respiratory, contact and droplet per protocol    Diagnostics: CBC, CMP, Ferritin, CRP, Troponin, and Portable CXR    Management: Initiate targeted therapy with Remdesivir, 200mg IV x1, followed by 100mg IV daily x5 days total and Anticoagulation, Patient admitted to non-critical care unit- Will initiate full dose anticoagulation with Weight based lovenox 1mg/kg IV q12, Maintain oxygen saturations 92-96% via Nasal Cannula  LPM and monitor with continuous/intermittent pulse  "oximetry. , Inhaled bronchodilators as needed for shortness of breath., and Continuous cardiac monitoring.    Advance Care Planning Current advance care plan has been discussed with patient/family/POA and patient currently wishes Full Code.     Hypokalemia  Patient's most recent potassium results are listed below.   Recent Labs     01/03/25  0510 01/04/25  0440 01/05/25  0531   K 4.2 4.5 4.2       Plan  - Replete potassium per protocol  - Monitor potassium Daily  - Patient's hypokalemia is  being treated. Received KCL 40 mEq PO x 1 in ED    CKD (chronic kidney disease)  Creatine stable for now. BMP reviewed- noted Estimated Creatinine Clearance: 48.4 mL/min (based on SCr of 0.8 mg/dL). according to latest data. Based on current GFR, CKD stage is stage 4 - GFR 15-29.  Monitor UOP and serial BMP and adjust therapy as needed. Renally dose meds. Avoid nephrotoxic medications and procedures.  -Creatinine 1.5 on admission however 3 months ago was 1.2    Recent Labs     01/03/25  0510 01/04/25  0440 01/05/25  0531   CREATININE 0.9 0.9 0.8         -Due to GFR, will need to dc ranexa for now and decrease dose of gabapentin  -Hold spironolactone   -Continue Entresto   -Monitor creatinine    Chronic diastolic heart failure  Patient has Diastolic (HFpEF) heart failure that is Chronic. On presentation their CHF was well compensated. Most recent BNP and echo results are listed below.  No results for input(s): "BNP" in the last 72 hours.    Latest ECHO  Results for orders placed during the hospital encounter of 06/02/23    Echo    Interpretation Summary  · The left ventricle is normal in size with concentric hypertrophy and low normal systolic function.  · The estimated ejection fraction is 50%.  · Normal left ventricular diastolic function.  · There is abnormal septal wall motion consistent with left bundle branch block.  · Normal right ventricular size with normal right ventricular systolic function.  · Normal central venous " pressure (3 mmHg).  · The estimated PA systolic pressure is 30 mmHg.  · Mild mitral regurgitation.    Current Heart Failure Medications  sacubitriL-valsartan 24-26 mg per tablet 1 tablet, 2 times daily, Oral    Plan  - Monitor strict I&Os and daily weights.    - Place on telemetry  - Low sodium diet  - Place on fluid restriction of 1.5 L.   - Cardiology has not been consulted  - The patient's volume status is at their baseline           Mixed hyperlipidemia  Lab Results   Component Value Date    CHOL 112 (L) 07/12/2024    HDL 41 07/12/2024    LDLCALC 49.8 (L) 07/12/2024    TRIG 106 07/12/2024     -Continue Statin    Normocytic anemia  Anemia is likely due to chronic disease due to Chronic Kidney Disease. Most recent hemoglobin and hematocrit are listed below.  Recent Labs     01/03/25  0510 01/04/25  0440 01/05/25  0531   HGB 12.0 11.6* 12.3   HCT 39.0 38.1 40.6       Plan  - Monitor serial CBC: Daily  - Transfuse PRBC if patient becomes hemodynamically unstable, symptomatic or H/H drops below 7/21.  - Patient has not received any PRBC transfusions to date  - Patient's anemia is currently stable    Seizure  -Continue Keppra  -Seizure precautions      Coronary artery disease of native artery of native heart with stable angina pectoris  Patient with known CAD, which is controlled Will continue Plavix and Statin and monitor for S/Sx of angina/ACS. Continue to monitor on telemetry.     GERD (gastroesophageal reflux disease)  -Continue PPI        VTE Risk Mitigation (From admission, onward)           Ordered     enoxaparin injection 50 mg  2 times daily         12/31/24 1518                    Discharge Planning   KATHERINE:      Code Status: Full Code   Medical Readiness for Discharge Date:   Discharge Plan A: Skilled Nursing Facility            Jalen Leal DO  Department of Hospital Medicine   [facility] - Telemetry (Hospital)    Voice recognition software was used in the creation of this note/communication and any  sound-alike/typographical errors which may have occurred despite initial review prior to signing should be taken in context when interpreting.  If such errors prevent a clear understanding of the note/communication, please contact the provider/office for clarification.      Ear Wedge Repair Text: A wedge excision was completed by carrying down an excision through the full thickness of the ear and cartilage with an inward facing Burow's triangle. The wound was then closed in a layered fashion.

## 2025-01-05 NOTE — SUBJECTIVE & OBJECTIVE
Interval History:  Reported improvements in respiratory status with decreased frequency of cough and dyspnea but still reporting significant symptoms from baseline.  We will trial weaning methylprednisolone dosing.  Still reporting some chest congestion concerns, but reports Mucomyst treatments seem to be helping, so will schedule Mucomyst treatments b.i.d. CT abdomen/pelvis imaging for abdominal pain was nonacute. Started on methocarbamol for suspected musculoskeletal abdominal pain as there is tenderness to palpation, likely etiology from strain of extensive coughing.       Objective:     Vital Signs (Most Recent):  Temp: 98.4 °F (36.9 °C) (01/05/25 1155)  Pulse: 68 (01/05/25 1427)  Resp: 20 (01/05/25 1427)  BP: (!) 124/59 (01/05/25 1316)  SpO2: 100 % (01/05/25 1427) Vital Signs (24h Range):  Temp:  [97.3 °F (36.3 °C)-98.4 °F (36.9 °C)] 98.4 °F (36.9 °C)  Pulse:  [54-88] 68  Resp:  [18-22] 20  SpO2:  [93 %-100 %] 100 %  BP: (124-177)/(59-76) 124/59     Weight: 55.4 kg (122 lb 2.2 oz)  Body mass index is 20.96 kg/m².    Intake/Output Summary (Last 24 hours) at 1/5/2025 1440  Last data filed at 1/5/2025 0502  Gross per 24 hour   Intake --   Output 1000 ml   Net -1000 ml         Physical Exam  Vitals and nursing note reviewed.   Constitutional:       General: She is not in acute distress.     Appearance: She is ill-appearing. She is not toxic-appearing or diaphoretic.      Interventions: Nasal cannula in place.   HENT:      Head: Normocephalic and atraumatic.      Mouth/Throat:      Mouth: Mucous membranes are moist.   Eyes:      General: No scleral icterus.        Right eye: No discharge.         Left eye: No discharge.   Cardiovascular:      Rate and Rhythm: Normal rate and regular rhythm.      Heart sounds: Normal heart sounds.   Pulmonary:      Effort: Pulmonary effort is normal. No respiratory distress.      Breath sounds: Decreased breath sounds, wheezing and rhonchi present.   Abdominal:      General: Bowel  sounds are normal. There is no distension.      Palpations: Abdomen is soft.      Tenderness: There is abdominal tenderness in the left lower quadrant. There is no guarding or rebound.   Musculoskeletal:      Cervical back: No rigidity.      Right lower leg: No edema.      Left lower leg: No edema.   Skin:     General: Skin is warm and dry.      Coloration: Skin is not jaundiced.   Neurological:      Mental Status: She is alert and oriented to person, place, and time. Mental status is at baseline.   Psychiatric:         Mood and Affect: Mood normal.         Behavior: Behavior normal.             Significant Labs: All pertinent labs within the past 24 hours have been reviewed.   Recent Labs   Lab 01/03/25  0510 01/04/25  0440 01/05/25  0531    136 136   K 4.2 4.5 4.2   * 110 108   CO2 18* 17* 19*   BUN 28* 29* 27*   CREATININE 0.9 0.9 0.8   * 158* 123*   ANIONGAP 6* 9 9     Recent Labs   Lab 01/03/25  0510 01/04/25  0440 01/05/25  0531   MG 2.0 1.8 2.1   PHOS 2.6* 2.6* 2.9     Recent Labs   Lab 01/03/25  0510 01/04/25  0440 01/05/25  0531   AST 10 17 19   ALT 9* 13 22   ALKPHOS 50 43 52   BILITOT 0.2 0.2 0.3   ALBUMIN 2.5* 2.3* 2.5*     POCT Glucose:   Recent Labs   Lab 01/02/25 2049 01/03/25  0618   POCTGLUCOSE 160* 119*    Recent Labs   Lab 01/03/25  0510 01/04/25  0440 01/05/25  0531   WBC 10.83 9.19 9.97   HGB 12.0 11.6* 12.3   HCT 39.0 38.1 40.6    197 216   GRAN 86.9*  9.4* 83.0*  7.6 79.4*  7.9*              Significant Imaging:  I have reviewed all pertinent imaging results/findings within the past 24 hours.   CT Abdomen Pelvis  Without Contrast   Final Result      No acute process..  Simple cyst of the bilateral kidneys.  Atherosclerotic disease.  Bilateral hip replacement.  Endplate deformities.  Remaining findings as above.      All CT scans   are performed using dose optimization techniques including the following: automated exposure control; adjustment of the mA and/or kV;  use of iterative reconstruction technique.  Dose modulation was employed for ALARA by means of: Automated exposure control; adjustment of the mA and/or kV according to patient size (this includes techniques or standardized protocols for targeted exams where dose is matched to indication/reason for exam; i.e. extremities or head); and/or use of iterative reconstructive technique.         Electronically signed by: Roland Ken   Date:    01/05/2025   Time:    00:03      X-Ray Chest AP Portable   Final Result      No acute abnormality.         Electronically signed by: Roland Ken   Date:    01/03/2025   Time:    20:43      CT Head Without Contrast   Final Result      No acute abnormality.      Prior aneurysm clipping.      All CT scans at this facility are performed  using dose modulation techniques as appropriate to performed exam including the following:  automated exposure control; adjustment of mA and/or kV according to the patients size (this includes techniques or standardized protocols for targeted exams where dose is matched to indication/reason for exam: i.e. extremities or head);  iterative reconstruction technique.         Electronically signed by: Shane Jones   Date:    01/01/2025   Time:    20:27      CT Chest Without Contrast   Final Result      Stable suspected pulmonary fibrosis of the lungs.  No acute infiltrates.         Electronically signed by: Tommy Maravilla MD   Date:    12/31/2024   Time:    13:59          Inpatient Medications:  Continuous Infusions:    Scheduled Meds:   acetylcysteine 100 mg/ml (10%)  4 mL Nebulization BID    ascorbic acid (vitamin C)  500 mg Oral BID    atorvastatin  40 mg Oral Daily    clopidogreL  75 mg Oral Daily    enoxparin  1 mg/kg Subcutaneous BID    EScitalopram oxalate  20 mg Oral Daily    fluticasone furoate-vilanteroL  1 puff Inhalation Daily    fluticasone propionate  2 spray Each Nostril Daily    gabapentin  200 mg Oral TID    guaiFENesin  600 mg Oral BID     ipratropium-albuteroL  1 puff Inhalation Q6H    isosorbide mononitrate  120 mg Oral QHS    levETIRAcetam  500 mg Oral BID    [START ON 1/6/2025] methylPREDNISolone injection (PEDS and ADULTS)  60 mg Intravenous Q12H    metoprolol tartrate  50 mg Oral BID    montelukast  10 mg Oral Daily    multivitamin  1 tablet Oral Daily    pantoprazole  40 mg Oral BID    roflumilast  1 tablet Oral Daily    sacubitriL-valsartan  1 tablet Oral BID    sodium bicarbonate  1,300 mg Oral TID       PRN Meds:  Current Facility-Administered Medications:     acetaminophen, 650 mg, Oral, Q6H PRN    dextrose 50%, 12.5 g, Intravenous, PRN    dextrose 50%, 25 g, Intravenous, PRN    glucagon (human recombinant), 1 mg, Intramuscular, PRN    glucose, 16 g, Oral, PRN    glucose, 24 g, Oral, PRN    HYDROcodone-acetaminophen, 1 tablet, Oral, Q6H PRN    hydrOXYzine HCL, 25 mg, Oral, TID PRN    melatonin, 6 mg, Oral, Nightly PRN    methocarbamoL, 500 mg, Oral, TID PRN    ondansetron, 4 mg, Intravenous, Q8H PRN    sodium chloride 0.9%, 10 mL, Intravenous, PRN

## 2025-01-05 NOTE — SUBJECTIVE & OBJECTIVE
Interval History:  No acute events overnight, afebrile, hemodynamically stable.  Still reports significant respiratory symptoms with persistent cough, dyspnea, however does continued to report some improvements.  Reported left lower quadrant tenderness to palpation, will evaluate via CT as patient is on therapeutic anticoagulation for COVID-19.  Objective:     Vital Signs (Most Recent):  Temp: 97.5 °F (36.4 °C) (01/04/25 1930)  Pulse: 88 (01/04/25 2101)  Resp: 18 (01/04/25 2101)  BP: (!) 152/68 (01/04/25 2052)  SpO2: 95 % (01/04/25 2101) Vital Signs (24h Range):  Temp:  [97.3 °F (36.3 °C)-97.5 °F (36.4 °C)] 97.5 °F (36.4 °C)  Pulse:  [50-88] 88  Resp:  [18-20] 18  SpO2:  [93 %-100 %] 95 %  BP: (123-168)/(63-74) 152/68     Weight: 53.4 kg (117 lb 11.6 oz)  Body mass index is 20.21 kg/m².    Intake/Output Summary (Last 24 hours) at 1/4/2025 2309  Last data filed at 1/4/2025 2130  Gross per 24 hour   Intake --   Output 1900 ml   Net -1900 ml         Physical Exam  Vitals and nursing note reviewed.   Constitutional:       General: She is not in acute distress.     Appearance: She is not ill-appearing, toxic-appearing or diaphoretic.   HENT:      Head: Normocephalic and atraumatic.      Mouth/Throat:      Mouth: Mucous membranes are moist.   Eyes:      General: No scleral icterus.        Right eye: No discharge.         Left eye: No discharge.   Cardiovascular:      Rate and Rhythm: Normal rate and regular rhythm.      Heart sounds: Normal heart sounds.   Pulmonary:      Effort: Pulmonary effort is normal. No respiratory distress.      Breath sounds: Decreased breath sounds, wheezing and rhonchi present.   Abdominal:      General: Bowel sounds are normal. There is no distension.      Palpations: Abdomen is soft.      Tenderness: There is abdominal tenderness. There is no guarding or rebound.   Musculoskeletal:      Cervical back: No rigidity.      Right lower leg: No edema.      Left lower leg: No edema.   Skin:      General: Skin is warm and dry.      Coloration: Skin is not jaundiced.   Neurological:      Mental Status: She is alert and oriented to person, place, and time. Mental status is at baseline.   Psychiatric:         Mood and Affect: Mood normal.         Behavior: Behavior normal.             Significant Labs: All pertinent labs within the past 24 hours have been reviewed.   Recent Labs   Lab 01/02/25 0542 01/03/25  0510 01/04/25  0440   * 136 136   K 4.3 4.2 4.5   * 112* 110   CO2 16* 18* 17*   BUN 29* 28* 29*   CREATININE 0.9 0.9 0.9   * 132* 158*   ANIONGAP 7* 6* 9     Recent Labs   Lab 01/02/25 0542 01/03/25  0510 01/04/25  0440   MG 2.1 2.0 1.8   PHOS 3.2 2.6* 2.6*     Recent Labs   Lab 01/02/25 0542 01/03/25  0510 01/04/25  0440   AST 11 10 17   ALT 10 9* 13   ALKPHOS 45 50 43   BILITOT 0.2 0.2 0.2   ALBUMIN 2.3* 2.5* 2.3*     POCT Glucose:   Recent Labs   Lab 01/02/25 2049 01/03/25  0618   POCTGLUCOSE 160* 119*    Recent Labs   Lab 01/02/25 0542 01/03/25  0510 01/04/25  0440   WBC 12.56 10.83 9.19   HGB 10.2* 12.0 11.6*   HCT 33.7* 39.0 38.1    247 197   GRAN 85.7*  10.8* 86.9*  9.4* 83.0*  7.6              Significant Imaging:  I have reviewed all pertinent imaging results/findings within the past 24 hours.   X-Ray Chest AP Portable   Final Result      No acute abnormality.         Electronically signed by: Roland Ken   Date:    01/03/2025   Time:    20:43      CT Head Without Contrast   Final Result      No acute abnormality.      Prior aneurysm clipping.      All CT scans at this facility are performed  using dose modulation techniques as appropriate to performed exam including the following:  automated exposure control; adjustment of mA and/or kV according to the patients size (this includes techniques or standardized protocols for targeted exams where dose is matched to indication/reason for exam: i.e. extremities or head);  iterative reconstruction technique.          Electronically signed by: Shane Jones   Date:    01/01/2025   Time:    20:27      CT Chest Without Contrast   Final Result      Stable suspected pulmonary fibrosis of the lungs.  No acute infiltrates.         Electronically signed by: Tommy Maravilla MD   Date:    12/31/2024   Time:    13:59      CT Abdomen Pelvis  Without Contrast    (Results Pending)       Inpatient Medications:  Continuous Infusions:    Scheduled Meds:   ascorbic acid (vitamin C)  500 mg Oral BID    atorvastatin  40 mg Oral Daily    clopidogreL  75 mg Oral Daily    enoxparin  1 mg/kg Subcutaneous BID    EScitalopram oxalate  20 mg Oral Daily    fluticasone furoate-vilanteroL  1 puff Inhalation Daily    gabapentin  200 mg Oral TID    guaiFENesin  600 mg Oral BID    ipratropium-albuteroL  1 puff Inhalation Q6H    isosorbide mononitrate  120 mg Oral QHS    levETIRAcetam  500 mg Oral BID    methylPREDNISolone injection (PEDS and ADULTS)  60 mg Intravenous Q8H    metoprolol tartrate  50 mg Oral BID    montelukast  10 mg Oral Daily    multivitamin  1 tablet Oral Daily    pantoprazole  40 mg Oral BID    roflumilast  1 tablet Oral Daily    sacubitriL-valsartan  1 tablet Oral BID    sodium bicarbonate  1,300 mg Oral TID       PRN Meds:  Current Facility-Administered Medications:     acetaminophen, 650 mg, Oral, Q6H PRN    dextrose 50%, 12.5 g, Intravenous, PRN    dextrose 50%, 25 g, Intravenous, PRN    glucagon (human recombinant), 1 mg, Intramuscular, PRN    glucose, 16 g, Oral, PRN    glucose, 24 g, Oral, PRN    HYDROcodone-acetaminophen, 1 tablet, Oral, Q6H PRN    hydrOXYzine HCL, 25 mg, Oral, TID PRN    melatonin, 6 mg, Oral, Nightly PRN    ondansetron, 4 mg, Intravenous, Q8H PRN    sodium chloride 0.9%, 10 mL, Intravenous, PRN

## 2025-01-05 NOTE — ASSESSMENT & PLAN NOTE
Patient's most recent potassium results are listed below.   Recent Labs     01/03/25  0510 01/04/25  0440 01/05/25  0531   K 4.2 4.5 4.2       Plan  - Replete potassium per protocol  - Monitor potassium Daily  - Patient's hypokalemia is  being treated. Received KCL 40 mEq PO x 1 in ED

## 2025-01-05 NOTE — ASSESSMENT & PLAN NOTE
Anemia is likely due to chronic disease due to Chronic Kidney Disease. Most recent hemoglobin and hematocrit are listed below.  Recent Labs     01/02/25  0542 01/03/25  0510 01/04/25  0440   HGB 10.2* 12.0 11.6*   HCT 33.7* 39.0 38.1       Plan  - Monitor serial CBC: Daily  - Transfuse PRBC if patient becomes hemodynamically unstable, symptomatic or H/H drops below 7/21.  - Patient has not received any PRBC transfusions to date  - Patient's anemia is currently stable

## 2025-01-05 NOTE — ASSESSMENT & PLAN NOTE
Creatine stable for now. BMP reviewed- noted Estimated Creatinine Clearance: 42 mL/min (based on SCr of 0.9 mg/dL). according to latest data. Based on current GFR, CKD stage is stage 4 - GFR 15-29.  Monitor UOP and serial BMP and adjust therapy as needed. Renally dose meds. Avoid nephrotoxic medications and procedures.  -Creatinine 1.5 on admission however 3 months ago was 1.2    Recent Labs     01/02/25  0542 01/03/25  0510 01/04/25  0440   CREATININE 0.9 0.9 0.9         -Due to GFR, will need to dc ranexa for now and decrease dose of gabapentin  -Hold spironolactone   -Continue Entresto   -Monitor creatinine

## 2025-01-05 NOTE — PLAN OF CARE
PATIENT REFUSED TO GET OOB TODAY DUE TO HER JUST GETTING COMFORTABLE    TE X 10 EACH: SHOULDER FLEXION, hip abduction/HIP ADDUCTION, AND SLR     EDUCATED ON ROLLING L/R T/O THE DAY FOR PRESSURE RELIEF.

## 2025-01-05 NOTE — PT/OT/SLP PROGRESS
Physical Therapy  Treatment    Shireen Felix   MRN: [id number]   Admitting Diagnosis: COVID-19       PT Start Time: 1150     PT Stop Time: 1215    PT Total Time (min): 25 min       Billable Minutes:  Therapeutic Exercise 25    Treatment Type: Treatment  PT/PTA: PTA     Number of PTA visits since last PT visit: 1       General Precautions: Standard, airborne, droplet, fall, contact  Orthopedic Precautions: N/A  Braces: N/A  Respiratory Status: Nasal cannula, flow 3 L/min    Spiritual, Cultural Beliefs, Roman Catholic Practices, Values that Affect Care: no    Subjective:  Communicated with SERENA prior to session.      Pain/Comfort  Pain Rating 1: 0/10  Pain Rating Post-Intervention 1: 0/10    Treatment and Education:    PATIENT REFUSED TO GET OOB TODAY DUE TO HER JUST GETTING COMFORTABLE    ROLLING L/R FOR PRESSURE RELIEF WITH USE OF RAILS.     TE X 10 EACH: SHOULDER FLEXION, hip abduction/HIP ADDUCTION, AND SLR     EDUCATED ON ROLLING L/R T/O THE DAY FOR PRESSURE RELIEF. DECREASED R SHOULDER FLEXION DUE TO PREVIOUS INJURY     AM-PAC 6 CLICK MOBILITY  How much help from another person does this patient currently need?   1 = Unable, Total/Dependent Assistance  2 = A lot, Maximum/Moderate Assistance  3 = A little, Minimum/Contact Guard/Supervision  4 = None, Modified [place]/Independent    Turning over in bed (including adjusting bedclothes, sheets and blankets)?: 4  Sitting down on and standing up from a chair with arms (e.g., wheelchair, bedside commode, etc.):  (NA)  Moving from lying on back to sitting on the side of the bed?:  (NA)  Moving to and from a bed to a chair (including a wheelchair)?:  (NA)  Need to walk in hospital room?:  (NA)  Climbing 3-5 steps with a railing?:  (NA)    AM-PAC Raw Score CMS G-Code Modifier Level of Impairment Assistance   6 % Total / Unable   7 - 9 CM 80 - 100% Maximal Assist   10 - 14 CL 60 - 80% Moderate Assist   15 - 19 CK 40 - 60% Moderate Assist   20 - 22 CJ 20 -  40% Minimal Assist   23 CI 1-20% SBA / CGA   24 CH 0% Independent/ Mod I     Patient left supine with all lines intact, call button in reach, and bed alarm on.    Assessment:  Shireen Felix is a 79 y.o. female with a medical diagnosis of COVID-19 and presents with .    Rehab identified problem list/impairments: weakness, impaired endurance, decreased lower extremity function, decreased upper extremity function, decreased ROM, impaired functional mobility, impaired self care skills    Rehab potential is good.    Activity tolerance: Fair    Discharge recommendations: Moderate Intensity Therapy      Barriers to discharge:      Equipment recommendations: to be determined by next level of care     GOALS:   Multidisciplinary Problems       Physical Therapy Goals          Problem: Physical Therapy    Goal Priority Disciplines Outcome Interventions   Physical Therapy Goal     PT, PT/OT     Description: Goals to be met by 1/17/25.  1. Pt will complete bed mobility SBA.  2. Pt will complete sit to stand SBA.  3. Pt will ambulate 100ft SBA using RW.  4. Pt will increase AMPAC score by 2 points to progress functional mobility.                       PLAN:    Patient to be seen 3 x/week to address the above listed problems via therapeutic exercises, therapeutic activities, gait training  Plan of Care expires: 01/17/25  Plan of Care reviewed with: patient         01/05/2025

## 2025-01-05 NOTE — PROGRESS NOTES
'[facility]  Progress Note    Patient Name: Shireen Felix  MRN: [id number]  Patient Class: IP- Inpatient   Admission Date: 12/31/2024  Length of Stay: 3 days  Attending Physician: Jalen Leal DO  Primary Care Provider: Laron Chaudhari MD        Subjective     Principal Problem:COVID-19        HPI:  Shireen Felix is a 79 year old female who has  has a past medical history of Abnormal ECG, Acute bronchitis and bronchiolitis, Aneurysm, Anticoagulant long-term use, Arthritis, CAD in native artery, COPD (chronic obstructive pulmonary disease), Diabetes mellitus, Fall, Glaucoma, Hypertension, Seizures, Shingles, and Stroke who presented to Emergency Department for evaluation for cough. Associated symptoms include: congestion, wheezing, and SOB. She was seen by her pulmonologist today, Dr. Sam. Started as sinus pain and drainage and now has progressed to respiratory symptoms. She was seen a few days ago in the ER given prescription for antibiotics steroids but these have not helped much. Today in the office she can barely talk without coughing continually. She is having a difficult time putting more than 3 words together without severe cough paroxysms. ED workup showed Hgb/Hct 11.9/37.0, K+ 3.1, CO2 16, BUN/creatinine 34/1.5. CT chest w/out contrast stable suspected pulmonary fibrosis of the lungs. No acute infiltrates. She has received DuoNeb x 1, solumedrol 125 mg IV x 1, KCL 40 mEq PO x 1 and NS 1L bolus. Pt admitted for COVID.     Overview/Hospital Course:  Admitted to Hospital Medicine for acute on chronic respiratory failure concerns in setting of COVID-19.  Started on IV methylprednisolone, remdesivir, scheduled respiratory treatments.  Reported left lower quadrant abdominal pain with tenderness to palpation, will evaluate via CT as patient is on therapeutic anticoagulation for COVID-19.    Interval History:  No acute events overnight, afebrile,  hemodynamically stable.  Still reports significant respiratory symptoms with persistent cough, dyspnea, however does continued to report some improvements.  Reported left lower quadrant tenderness to palpation, will evaluate via CT as patient is on therapeutic anticoagulation for COVID-19.  Objective:     Vital Signs (Most Recent):  Temp: 97.5 °F (36.4 °C) (01/04/25 1930)  Pulse: 88 (01/04/25 2101)  Resp: 18 (01/04/25 2101)  BP: (!) 152/68 (01/04/25 2052)  SpO2: 95 % (01/04/25 2101) Vital Signs (24h Range):  Temp:  [97.3 °F (36.3 °C)-97.5 °F (36.4 °C)] 97.5 °F (36.4 °C)  Pulse:  [50-88] 88  Resp:  [18-20] 18  SpO2:  [93 %-100 %] 95 %  BP: (123-168)/(63-74) 152/68     Weight: 53.4 kg (117 lb 11.6 oz)  Body mass index is 20.21 kg/m².    Intake/Output Summary (Last 24 hours) at 1/4/2025 2309  Last data filed at 1/4/2025 2130  Gross per 24 hour   Intake --   Output 1900 ml   Net -1900 ml         Physical Exam  Vitals and nursing note reviewed.   Constitutional:       General: She is not in acute distress.     Appearance: She is not ill-appearing, toxic-appearing or diaphoretic.   HENT:      Head: Normocephalic and atraumatic.      Mouth/Throat:      Mouth: Mucous membranes are moist.   Eyes:      General: No scleral icterus.        Right eye: No discharge.         Left eye: No discharge.   Cardiovascular:      Rate and Rhythm: Normal rate and regular rhythm.      Heart sounds: Normal heart sounds.   Pulmonary:      Effort: Pulmonary effort is normal. No respiratory distress.      Breath sounds: Decreased breath sounds, wheezing and rhonchi present.   Abdominal:      General: Bowel sounds are normal. There is no distension.      Palpations: Abdomen is soft.      Tenderness: There is abdominal tenderness. There is no guarding or rebound.   Musculoskeletal:      Cervical back: No rigidity.      Right lower leg: No edema.      Left lower leg: No edema.   Skin:     General: Skin is warm and dry.      Coloration: Skin is not  jaundiced.   Neurological:      Mental Status: She is alert and oriented to person, place, and time. Mental status is at baseline.   Psychiatric:         Mood and Affect: Mood normal.         Behavior: Behavior normal.             Significant Labs: All pertinent labs within the past 24 hours have been reviewed.   Recent Labs   Lab 01/02/25 0542 01/03/25  0510 01/04/25  0440   * 136 136   K 4.3 4.2 4.5   * 112* 110   CO2 16* 18* 17*   BUN 29* 28* 29*   CREATININE 0.9 0.9 0.9   * 132* 158*   ANIONGAP 7* 6* 9     Recent Labs   Lab 01/02/25  0542 01/03/25  0510 01/04/25  0440   MG 2.1 2.0 1.8   PHOS 3.2 2.6* 2.6*     Recent Labs   Lab 01/02/25  0542 01/03/25  0510 01/04/25  0440   AST 11 10 17   ALT 10 9* 13   ALKPHOS 45 50 43   BILITOT 0.2 0.2 0.2   ALBUMIN 2.3* 2.5* 2.3*     POCT Glucose:   Recent Labs   Lab 01/02/25  2049 01/03/25  0618   POCTGLUCOSE 160* 119*    Recent Labs   Lab 01/02/25 0542 01/03/25  0510 01/04/25  0440   WBC 12.56 10.83 9.19   HGB 10.2* 12.0 11.6*   HCT 33.7* 39.0 38.1    247 197   GRAN 85.7*  10.8* 86.9*  9.4* 83.0*  7.6              Significant Imaging:  I have reviewed all pertinent imaging results/findings within the past 24 hours.   X-Ray Chest AP Portable   Final Result      No acute abnormality.         Electronically signed by: Roland Ken   Date:    01/03/2025   Time:    20:43      CT Head Without Contrast   Final Result      No acute abnormality.      Prior aneurysm clipping.      All CT scans at this facility are performed  using dose modulation techniques as appropriate to performed exam including the following:  automated exposure control; adjustment of mA and/or kV according to the patients size (this includes techniques or standardized protocols for targeted exams where dose is matched to indication/reason for exam: i.e. extremities or head);  iterative reconstruction technique.         Electronically signed by: Shane Jones   Date:    01/01/2025    Time:    20:27      CT Chest Without Contrast   Final Result      Stable suspected pulmonary fibrosis of the lungs.  No acute infiltrates.         Electronically signed by: Tommy Maravilla MD   Date:    12/31/2024   Time:    13:59      CT Abdomen Pelvis  Without Contrast    (Results Pending)       Inpatient Medications:  Continuous Infusions:    Scheduled Meds:   ascorbic acid (vitamin C)  500 mg Oral BID    atorvastatin  40 mg Oral Daily    clopidogreL  75 mg Oral Daily    enoxparin  1 mg/kg Subcutaneous BID    EScitalopram oxalate  20 mg Oral Daily    fluticasone furoate-vilanteroL  1 puff Inhalation Daily    gabapentin  200 mg Oral TID    guaiFENesin  600 mg Oral BID    ipratropium-albuteroL  1 puff Inhalation Q6H    isosorbide mononitrate  120 mg Oral QHS    levETIRAcetam  500 mg Oral BID    methylPREDNISolone injection (PEDS and ADULTS)  60 mg Intravenous Q8H    metoprolol tartrate  50 mg Oral BID    montelukast  10 mg Oral Daily    multivitamin  1 tablet Oral Daily    pantoprazole  40 mg Oral BID    roflumilast  1 tablet Oral Daily    sacubitriL-valsartan  1 tablet Oral BID    sodium bicarbonate  1,300 mg Oral TID       PRN Meds:  Current Facility-Administered Medications:     acetaminophen, 650 mg, Oral, Q6H PRN    dextrose 50%, 12.5 g, Intravenous, PRN    dextrose 50%, 25 g, Intravenous, PRN    glucagon (human recombinant), 1 mg, Intramuscular, PRN    glucose, 16 g, Oral, PRN    glucose, 24 g, Oral, PRN    HYDROcodone-acetaminophen, 1 tablet, Oral, Q6H PRN    hydrOXYzine HCL, 25 mg, Oral, TID PRN    melatonin, 6 mg, Oral, Nightly PRN    ondansetron, 4 mg, Intravenous, Q8H PRN    sodium chloride 0.9%, 10 mL, Intravenous, PRN      Assessment and Plan     * COVID-19  Patient is identified as Severe COVID-19 based on hypoxemia with O2 saturations <94% on room air or on ambulation   Initiate standard COVID protocols; COVID-19 testing ,Infection Control notification  and isolation- respiratory, contact and  "droplet per protocol    Diagnostics: CBC, CMP, Ferritin, CRP, Troponin, and Portable CXR    Management: Initiate targeted therapy with Remdesivir, 200mg IV x1, followed by 100mg IV daily x5 days total and Anticoagulation, Patient admitted to non-critical care unit- Will initiate full dose anticoagulation with Weight based lovenox 1mg/kg IV q12, Maintain oxygen saturations 92-96% via Nasal Cannula  LPM and monitor with continuous/intermittent pulse oximetry. , Inhaled bronchodilators as needed for shortness of breath., and Continuous cardiac monitoring.    Advance Care Planning Current advance care plan has been discussed with patient/family/POA and patient currently wishes Full Code.     Hypokalemia  Patient's most recent potassium results are listed below.   Recent Labs     01/02/25  0542 01/03/25  0510 01/04/25  0440   K 4.3 4.2 4.5       Plan  - Replete potassium per protocol  - Monitor potassium Daily  - Patient's hypokalemia is  being treated. Received KCL 40 mEq PO x 1 in ED    CKD (chronic kidney disease)  Creatine stable for now. BMP reviewed- noted Estimated Creatinine Clearance: 42 mL/min (based on SCr of 0.9 mg/dL). according to latest data. Based on current GFR, CKD stage is stage 4 - GFR 15-29.  Monitor UOP and serial BMP and adjust therapy as needed. Renally dose meds. Avoid nephrotoxic medications and procedures.  -Creatinine 1.5 on admission however 3 months ago was 1.2    Recent Labs     01/02/25  0542 01/03/25  0510 01/04/25  0440   CREATININE 0.9 0.9 0.9         -Due to GFR, will need to dc ranexa for now and decrease dose of gabapentin  -Hold spironolactone   -Continue Entresto   -Monitor creatinine    Chronic diastolic heart failure  Patient has Diastolic (HFpEF) heart failure that is Chronic. On presentation their CHF was well compensated. Most recent BNP and echo results are listed below.  No results for input(s): "BNP" in the last 72 hours.    Latest ECHO  Results for orders placed during the " hospital encounter of 06/02/23    Echo    Interpretation Summary  · The left ventricle is normal in size with concentric hypertrophy and low normal systolic function.  · The estimated ejection fraction is 50%.  · Normal left ventricular diastolic function.  · There is abnormal septal wall motion consistent with left bundle branch block.  · Normal right ventricular size with normal right ventricular systolic function.  · Normal central venous pressure (3 mmHg).  · The estimated PA systolic pressure is 30 mmHg.  · Mild mitral regurgitation.    Current Heart Failure Medications  sacubitriL-valsartan 24-26 mg per tablet 1 tablet, 2 times daily, Oral    Plan  - Monitor strict I&Os and daily weights.    - Place on telemetry  - Low sodium diet  - Place on fluid restriction of 1.5 L.   - Cardiology has not been consulted  - The patient's volume status is at their baseline           Mixed hyperlipidemia  Lab Results   Component Value Date    CHOL 112 (L) 07/12/2024    HDL 41 07/12/2024    LDLCALC 49.8 (L) 07/12/2024    TRIG 106 07/12/2024     -Continue Statin    Normocytic anemia  Anemia is likely due to chronic disease due to Chronic Kidney Disease. Most recent hemoglobin and hematocrit are listed below.  Recent Labs     01/02/25  0542 01/03/25  0510 01/04/25  0440   HGB 10.2* 12.0 11.6*   HCT 33.7* 39.0 38.1       Plan  - Monitor serial CBC: Daily  - Transfuse PRBC if patient becomes hemodynamically unstable, symptomatic or H/H drops below 7/21.  - Patient has not received any PRBC transfusions to date  - Patient's anemia is currently stable    Seizure  -Continue Keppra  -Seizure precautions      Coronary artery disease of native artery of native heart with stable angina pectoris  Patient with known CAD, which is controlled Will continue Plavix and Statin and monitor for S/Sx of angina/ACS. Continue to monitor on telemetry.     GERD (gastroesophageal reflux disease)  -Continue PPI        VTE Risk Mitigation (From  admission, onward)           Ordered     enoxaparin injection 50 mg  2 times daily         12/31/24 1518                    Discharge Planning   KATHERINE:      Code Status: Full Code   Medical Readiness for Discharge Date:   Discharge Plan A: Skilled Nursing Facility              Jalen Leal DO  Department of Hospital Medicine   [facility] Telemetry ([facility])      Voice recognition software was used in the creation of this note/communication and any sound-alike/typographical errors which may have occurred despite initial review prior to signing should be taken in context when interpreting.  If such errors prevent a clear understanding of the note/communication, please contact the provider/office for clarification.

## 2025-01-05 NOTE — ASSESSMENT & PLAN NOTE
Anemia is likely due to chronic disease due to Chronic Kidney Disease. Most recent hemoglobin and hematocrit are listed below.  Recent Labs     01/03/25  0510 01/04/25  0440 01/05/25  0531   HGB 12.0 11.6* 12.3   HCT 39.0 38.1 40.6       Plan  - Monitor serial CBC: Daily  - Transfuse PRBC if patient becomes hemodynamically unstable, symptomatic or H/H drops below 7/21.  - Patient has not received any PRBC transfusions to date  - Patient's anemia is currently stable

## 2025-01-05 NOTE — PLAN OF CARE
A223/A223 OLU Felix is a 79 y.o.female admitted on 12/31/2024 for COVID-19   Code Status: Full Code MRN: [id number]   Review of patient's allergies indicates:   Allergen Reactions    Penicillins Anaphylaxis, Hives, Shortness Of Breath and Swelling    Penicillin     Adhesive Rash    Doxycycline Nausea Only    Oxycodone Other (See Comments)     Fatigue      Sulfa (sulfonamide antibiotics) Itching     Past Medical History:   Diagnosis Date    Abnormal ECG 08/05/2019    Acute bronchitis and bronchiolitis 12/31/2024    Aneurysm     Anticoagulant long-term use     Arthritis     CAD in native artery 08/05/2019    COPD (chronic obstructive pulmonary disease)     Diabetes mellitus     Fall 10/10/2019    Formatting of this note might be different from the original. Found sitting on floor next to bed last night Mild confusion today Does not recall falling or how she ended up on floor UA today Labs yesterday unremarkable    Glaucoma     Hypertension     Seizures     Shingles 05/27/2017    Stroke       PRN meds    acetaminophen, 650 mg, Q6H PRN  dextrose 50%, 12.5 g, PRN  dextrose 50%, 25 g, PRN  glucagon (human recombinant), 1 mg, PRN  glucose, 16 g, PRN  glucose, 24 g, PRN  HYDROcodone-acetaminophen, 1 tablet, Q6H PRN  hydrOXYzine HCL, 25 mg, TID PRN  melatonin, 6 mg, Nightly PRN  ondansetron, 4 mg, Q8H PRN  sodium chloride 0.9%, 10 mL, PRN         Pt oriented x4.  VSS.  Pt remained afebrile throughout this shift.   All meds administered per order.   Pt remained free of falls this shift.   Plan of care reviewed. Patient verbalizes understanding.   Pt moving/turning independently.   Bed low, side rails up x 2, wheels locked, call light in reach.   Patient instructed to call for assistance.  Patient education provided      Chart check completed. Will continue plan of care.         Paris Coma Scale Score: 15     Lead Monitored: Lead II Rhythm: sinus bradycardia    Cardiac/Telemetry Box Number: ICU 13  VTE Core  Measure: Pharmacological prophylaxis initiated/maintained Last Bowel Movement: 01/03/25  Diet Cardiac Standard Tray  Voiding Characteristics: external catheter  Gilberto Score: 20  Fall Risk Score: 19  Accucheck []   Freq?      Lines/Drains/Airways       Drain  Duration             Female External Urinary Catheter w/ Suction 12/31/24 2230 3 days              Peripheral Intravenous Line  Duration                  Peripheral IV - Single Lumen 01/04/25 1317 22 G Left;Posterior;Proximal Forearm <1 day                       Problem: Adult Inpatient Plan of Care  Goal: Plan of Care Review  Outcome: Progressing  Goal: Patient-Specific Goal (Individualized)  Outcome: Progressing  Goal: Absence of Hospital-Acquired Illness or Injury  Outcome: Progressing  Goal: Optimal Comfort and Wellbeing  Outcome: Progressing  Goal: Readiness for Transition of Care  Outcome: Progressing     Problem: Diabetes Comorbidity  Goal: Blood Glucose Level Within Targeted Range  Outcome: Progressing     Problem: Fall Injury Risk  Goal: Absence of Fall and Fall-Related Injury  Outcome: Progressing     Problem: Wound  Goal: Optimal Coping  Outcome: Progressing  Goal: Optimal Functional Ability  Outcome: Progressing  Goal: Absence of Infection Signs and Symptoms  Outcome: Progressing  Goal: Improved Oral Intake  Outcome: Progressing  Goal: Optimal Pain Control and Function  Outcome: Progressing  Goal: Skin Health and Integrity  Outcome: Progressing  Goal: Optimal Wound Healing  Outcome: Progressing

## 2025-01-05 NOTE — ASSESSMENT & PLAN NOTE
Creatine stable for now. BMP reviewed- noted Estimated Creatinine Clearance: 48.4 mL/min (based on SCr of 0.8 mg/dL). according to latest data. Based on current GFR, CKD stage is stage 4 - GFR 15-29.  Monitor UOP and serial BMP and adjust therapy as needed. Renally dose meds. Avoid nephrotoxic medications and procedures.  -Creatinine 1.5 on admission however 3 months ago was 1.2    Recent Labs     01/03/25  0510 01/04/25  0440 01/05/25  0531   CREATININE 0.9 0.9 0.8         -Due to GFR, will need to dc ranexa for now and decrease dose of gabapentin  -Hold spironolactone   -Continue Entresto   -Monitor creatinine

## 2025-01-06 LAB
ALBUMIN SERPL BCP-MCNC: 2.5 G/DL (ref 3.5–5.2)
ALLENS TEST: ABNORMAL
ALP SERPL-CCNC: 47 U/L (ref 40–150)
ALT SERPL W/O P-5'-P-CCNC: 22 U/L (ref 10–44)
ANION GAP SERPL CALC-SCNC: 10 MMOL/L (ref 8–16)
AST SERPL-CCNC: 27 U/L (ref 10–40)
BASOPHILS # BLD AUTO: 0.06 K/UL (ref 0–0.2)
BASOPHILS NFR BLD: 0.5 % (ref 0–1.9)
BILIRUB SERPL-MCNC: 0.3 MG/DL (ref 0.1–1)
BUN SERPL-MCNC: 32 MG/DL (ref 8–23)
CALCIUM SERPL-MCNC: 8.4 MG/DL (ref 8.7–10.5)
CHLORIDE SERPL-SCNC: 106 MMOL/L (ref 95–110)
CO2 SERPL-SCNC: 20 MMOL/L (ref 23–29)
CREAT SERPL-MCNC: 0.8 MG/DL (ref 0.5–1.4)
D DIMER PPP IA.FEU-MCNC: <0.19 MG/L FEU
DELSYS: ABNORMAL
DIFFERENTIAL METHOD BLD: ABNORMAL
EOSINOPHIL # BLD AUTO: 0 K/UL (ref 0–0.5)
EOSINOPHIL NFR BLD: 0.1 % (ref 0–8)
ERYTHROCYTE [DISTWIDTH] IN BLOOD BY AUTOMATED COUNT: 14.6 % (ref 11.5–14.5)
EST. GFR  (NO RACE VARIABLE): >60 ML/MIN/1.73 M^2
FERRITIN SERPL-MCNC: 146 NG/ML (ref 20–300)
FLOW: 15
GLUCOSE SERPL-MCNC: 96 MG/DL (ref 70–110)
HCO3 UR-SCNC: 20.8 MMOL/L (ref 24–28)
HCT VFR BLD AUTO: 41 % (ref 37–48.5)
HGB BLD-MCNC: 12.7 G/DL (ref 12–16)
IMM GRANULOCYTES # BLD AUTO: 0.49 K/UL (ref 0–0.04)
IMM GRANULOCYTES NFR BLD AUTO: 3.9 % (ref 0–0.5)
LDH SERPL L TO P-CCNC: 336 U/L (ref 110–260)
LYMPHOCYTES # BLD AUTO: 1.7 K/UL (ref 1–4.8)
LYMPHOCYTES NFR BLD: 13.5 % (ref 18–48)
MAGNESIUM SERPL-MCNC: 2 MG/DL (ref 1.6–2.6)
MCH RBC QN AUTO: 30.2 PG (ref 27–31)
MCHC RBC AUTO-ENTMCNC: 31 G/DL (ref 32–36)
MCV RBC AUTO: 98 FL (ref 82–98)
MODE: ABNORMAL
MONOCYTES # BLD AUTO: 0.9 K/UL (ref 0.3–1)
MONOCYTES NFR BLD: 7.2 % (ref 4–15)
NEUTROPHILS # BLD AUTO: 9.4 K/UL (ref 1.8–7.7)
NEUTROPHILS NFR BLD: 74.8 % (ref 38–73)
NRBC BLD-RTO: 0 /100 WBC
PCO2 BLDA: 26.7 MMHG (ref 35–45)
PH SMN: 7.5 [PH] (ref 7.35–7.45)
PHOSPHATE SERPL-MCNC: 3 MG/DL (ref 2.7–4.5)
PLATELET # BLD AUTO: 209 K/UL (ref 150–450)
PMV BLD AUTO: 9.8 FL (ref 9.2–12.9)
PO2 BLDA: 469 MMHG (ref 80–100)
POC BE: -2 MMOL/L
POC SATURATED O2: 100 % (ref 95–100)
POTASSIUM SERPL-SCNC: 5.5 MMOL/L (ref 3.5–5.1)
PROT SERPL-MCNC: 5.7 G/DL (ref 6–8.4)
RBC # BLD AUTO: 4.2 M/UL (ref 4–5.4)
SAMPLE: ABNORMAL
SITE: ABNORMAL
SODIUM SERPL-SCNC: 136 MMOL/L (ref 136–145)
WBC # BLD AUTO: 12.55 K/UL (ref 3.9–12.7)

## 2025-01-06 PROCEDURE — 85025 COMPLETE CBC W/AUTO DIFF WBC: CPT | Performed by: STUDENT IN AN ORGANIZED HEALTH CARE EDUCATION/TRAINING PROGRAM

## 2025-01-06 PROCEDURE — 84100 ASSAY OF PHOSPHORUS: CPT | Performed by: STUDENT IN AN ORGANIZED HEALTH CARE EDUCATION/TRAINING PROGRAM

## 2025-01-06 PROCEDURE — 63600175 PHARM REV CODE 636 W HCPCS: Performed by: NURSE PRACTITIONER

## 2025-01-06 PROCEDURE — 25000242 PHARM REV CODE 250 ALT 637 W/ HCPCS: Performed by: STUDENT IN AN ORGANIZED HEALTH CARE EDUCATION/TRAINING PROGRAM

## 2025-01-06 PROCEDURE — 25000003 PHARM REV CODE 250: Performed by: STUDENT IN AN ORGANIZED HEALTH CARE EDUCATION/TRAINING PROGRAM

## 2025-01-06 PROCEDURE — 83615 LACTATE (LD) (LDH) ENZYME: CPT | Performed by: STUDENT IN AN ORGANIZED HEALTH CARE EDUCATION/TRAINING PROGRAM

## 2025-01-06 PROCEDURE — 85379 FIBRIN DEGRADATION QUANT: CPT | Performed by: STUDENT IN AN ORGANIZED HEALTH CARE EDUCATION/TRAINING PROGRAM

## 2025-01-06 PROCEDURE — 94640 AIRWAY INHALATION TREATMENT: CPT

## 2025-01-06 PROCEDURE — 25000003 PHARM REV CODE 250: Performed by: NURSE PRACTITIONER

## 2025-01-06 PROCEDURE — 93005 ELECTROCARDIOGRAM TRACING: CPT

## 2025-01-06 PROCEDURE — 80053 COMPREHEN METABOLIC PANEL: CPT | Performed by: STUDENT IN AN ORGANIZED HEALTH CARE EDUCATION/TRAINING PROGRAM

## 2025-01-06 PROCEDURE — 63600175 PHARM REV CODE 636 W HCPCS: Mod: JZ | Performed by: STUDENT IN AN ORGANIZED HEALTH CARE EDUCATION/TRAINING PROGRAM

## 2025-01-06 PROCEDURE — 27000207 HC ISOLATION

## 2025-01-06 PROCEDURE — 82728 ASSAY OF FERRITIN: CPT | Performed by: NURSE PRACTITIONER

## 2025-01-06 PROCEDURE — 97530 THERAPEUTIC ACTIVITIES: CPT

## 2025-01-06 PROCEDURE — 93010 ELECTROCARDIOGRAM REPORT: CPT | Mod: ,,, | Performed by: INTERNAL MEDICINE

## 2025-01-06 PROCEDURE — 36415 COLL VENOUS BLD VENIPUNCTURE: CPT | Performed by: NURSE PRACTITIONER

## 2025-01-06 PROCEDURE — 83735 ASSAY OF MAGNESIUM: CPT | Performed by: STUDENT IN AN ORGANIZED HEALTH CARE EDUCATION/TRAINING PROGRAM

## 2025-01-06 PROCEDURE — 94761 N-INVAS EAR/PLS OXIMETRY MLT: CPT

## 2025-01-06 PROCEDURE — 82803 BLOOD GASES ANY COMBINATION: CPT

## 2025-01-06 PROCEDURE — 36415 COLL VENOUS BLD VENIPUNCTURE: CPT | Performed by: STUDENT IN AN ORGANIZED HEALTH CARE EDUCATION/TRAINING PROGRAM

## 2025-01-06 PROCEDURE — 27000221 HC OXYGEN, UP TO 24 HOURS

## 2025-01-06 PROCEDURE — 25000003 PHARM REV CODE 250: Performed by: FAMILY MEDICINE

## 2025-01-06 PROCEDURE — 36600 WITHDRAWAL OF ARTERIAL BLOOD: CPT

## 2025-01-06 PROCEDURE — 25000242 PHARM REV CODE 250 ALT 637 W/ HCPCS: Performed by: NURSE PRACTITIONER

## 2025-01-06 PROCEDURE — 21400001 HC TELEMETRY ROOM

## 2025-01-06 PROCEDURE — 99900035 HC TECH TIME PER 15 MIN (STAT)

## 2025-01-06 RX ORDER — IPRATROPIUM BROMIDE AND ALBUTEROL SULFATE 2.5; .5 MG/3ML; MG/3ML
3 SOLUTION RESPIRATORY (INHALATION) EVERY 4 HOURS
Status: DISCONTINUED | OUTPATIENT
Start: 2025-01-06 | End: 2025-01-07

## 2025-01-06 RX ORDER — MORPHINE SULFATE 2 MG/ML
2 INJECTION, SOLUTION INTRAMUSCULAR; INTRAVENOUS ONCE
Status: COMPLETED | OUTPATIENT
Start: 2025-01-06 | End: 2025-01-06

## 2025-01-06 RX ORDER — MUPIROCIN 20 MG/G
OINTMENT TOPICAL 2 TIMES DAILY
Status: COMPLETED | OUTPATIENT
Start: 2025-01-06 | End: 2025-01-10

## 2025-01-06 RX ORDER — METHOCARBAMOL 500 MG/1
500 TABLET, FILM COATED ORAL 4 TIMES DAILY
Status: DISCONTINUED | OUTPATIENT
Start: 2025-01-06 | End: 2025-01-07

## 2025-01-06 RX ORDER — ACETYLCYSTEINE 100 MG/ML
4 SOLUTION ORAL; RESPIRATORY (INHALATION) 2 TIMES DAILY
Status: DISCONTINUED | OUTPATIENT
Start: 2025-01-06 | End: 2025-01-06

## 2025-01-06 RX ORDER — BUDESONIDE 0.5 MG/2ML
0.5 INHALANT ORAL EVERY 12 HOURS
Status: DISCONTINUED | OUTPATIENT
Start: 2025-01-06 | End: 2025-01-07

## 2025-01-06 RX ORDER — FUROSEMIDE 10 MG/ML
40 INJECTION INTRAMUSCULAR; INTRAVENOUS ONCE
Status: COMPLETED | OUTPATIENT
Start: 2025-01-06 | End: 2025-01-06

## 2025-01-06 RX ORDER — BENZONATATE 100 MG/1
100 CAPSULE ORAL 3 TIMES DAILY
Status: DISCONTINUED | OUTPATIENT
Start: 2025-01-06 | End: 2025-01-19

## 2025-01-06 RX ADMIN — FLUTICASONE FUROATE AND VILANTEROL TRIFENATATE 1 PUFF: 100; 25 POWDER RESPIRATORY (INHALATION) at 07:01

## 2025-01-06 RX ADMIN — METOPROLOL TARTRATE 50 MG: 50 TABLET, FILM COATED ORAL at 08:01

## 2025-01-06 RX ADMIN — ENOXAPARIN SODIUM 50 MG: 60 INJECTION SUBCUTANEOUS at 08:01

## 2025-01-06 RX ADMIN — METHYLPREDNISOLONE SODIUM SUCCINATE 60 MG: 40 INJECTION, POWDER, FOR SOLUTION INTRAMUSCULAR; INTRAVENOUS at 05:01

## 2025-01-06 RX ADMIN — OXYCODONE HYDROCHLORIDE AND ACETAMINOPHEN 500 MG: 500 TABLET ORAL at 08:01

## 2025-01-06 RX ADMIN — MUPIROCIN: 20 OINTMENT TOPICAL at 11:01

## 2025-01-06 RX ADMIN — LEVETIRACETAM 500 MG: 500 TABLET, FILM COATED ORAL at 08:01

## 2025-01-06 RX ADMIN — ACETAMINOPHEN 650 MG: 325 TABLET ORAL at 12:01

## 2025-01-06 RX ADMIN — ESCITALOPRAM OXALATE 20 MG: 10 TABLET, FILM COATED ORAL at 08:01

## 2025-01-06 RX ADMIN — BUDESONIDE 0.5 MG: 0.5 INHALANT RESPIRATORY (INHALATION) at 07:01

## 2025-01-06 RX ADMIN — HYDROCODONE BITARTRATE AND ACETAMINOPHEN 1 TABLET: 5; 325 TABLET ORAL at 08:01

## 2025-01-06 RX ADMIN — SODIUM BICARBONATE 650 MG TABLET 1300 MG: at 08:01

## 2025-01-06 RX ADMIN — PANTOPRAZOLE SODIUM 40 MG: 40 TABLET, DELAYED RELEASE ORAL at 08:01

## 2025-01-06 RX ADMIN — IPRATROPIUM BROMIDE AND ALBUTEROL 1 PUFF: 20; 100 SPRAY, METERED RESPIRATORY (INHALATION) at 07:01

## 2025-01-06 RX ADMIN — ATORVASTATIN CALCIUM 40 MG: 40 TABLET, FILM COATED ORAL at 08:01

## 2025-01-06 RX ADMIN — FLUTICASONE PROPIONATE 100 MCG: 50 SPRAY, METERED NASAL at 08:01

## 2025-01-06 RX ADMIN — METHOCARBAMOL 500 MG: 500 TABLET ORAL at 08:01

## 2025-01-06 RX ADMIN — THERA TABS 1 TABLET: TAB at 08:01

## 2025-01-06 RX ADMIN — SACUBITRIL AND VALSARTAN 1 TABLET: 24; 26 TABLET, FILM COATED ORAL at 08:01

## 2025-01-06 RX ADMIN — METHOCARBAMOL 500 MG: 500 TABLET ORAL at 11:01

## 2025-01-06 RX ADMIN — BENZONATATE 100 MG: 100 CAPSULE ORAL at 06:01

## 2025-01-06 RX ADMIN — IPRATROPIUM BROMIDE AND ALBUTEROL 1 PUFF: 20; 100 SPRAY, METERED RESPIRATORY (INHALATION) at 12:01

## 2025-01-06 RX ADMIN — IPRATROPIUM BROMIDE AND ALBUTEROL SULFATE 3 ML: 2.5; .5 SOLUTION RESPIRATORY (INHALATION) at 07:01

## 2025-01-06 RX ADMIN — METHYLPREDNISOLONE SODIUM SUCCINATE 60 MG: 40 INJECTION, POWDER, FOR SOLUTION INTRAMUSCULAR; INTRAVENOUS at 04:01

## 2025-01-06 RX ADMIN — METHOCARBAMOL 500 MG: 500 TABLET ORAL at 04:01

## 2025-01-06 RX ADMIN — GABAPENTIN 200 MG: 100 CAPSULE ORAL at 08:01

## 2025-01-06 RX ADMIN — ACETYLCYSTEINE 4 ML: 100 INHALANT RESPIRATORY (INHALATION) at 07:01

## 2025-01-06 RX ADMIN — GUAIFENESIN 600 MG: 600 TABLET, EXTENDED RELEASE ORAL at 08:01

## 2025-01-06 RX ADMIN — CLOPIDOGREL BISULFATE 75 MG: 75 TABLET ORAL at 08:01

## 2025-01-06 RX ADMIN — FUROSEMIDE 40 MG: 10 INJECTION, SOLUTION INTRAMUSCULAR; INTRAVENOUS at 07:01

## 2025-01-06 RX ADMIN — SODIUM BICARBONATE 650 MG TABLET 1300 MG: at 02:01

## 2025-01-06 RX ADMIN — IPRATROPIUM BROMIDE AND ALBUTEROL SULFATE 3 ML: 2.5; .5 SOLUTION RESPIRATORY (INHALATION) at 11:01

## 2025-01-06 RX ADMIN — MORPHINE SULFATE 2 MG: 2 INJECTION, SOLUTION INTRAMUSCULAR; INTRAVENOUS at 06:01

## 2025-01-06 RX ADMIN — IPRATROPIUM BROMIDE AND ALBUTEROL 1 PUFF: 20; 100 SPRAY, METERED RESPIRATORY (INHALATION) at 01:01

## 2025-01-06 RX ADMIN — MUPIROCIN: 20 OINTMENT TOPICAL at 09:01

## 2025-01-06 RX ADMIN — GABAPENTIN 200 MG: 100 CAPSULE ORAL at 02:01

## 2025-01-06 RX ADMIN — ROFLUMILAST 500 MCG: 500 TABLET ORAL at 08:01

## 2025-01-06 RX ADMIN — MONTELUKAST 10 MG: 10 TABLET, FILM COATED ORAL at 08:01

## 2025-01-06 NOTE — PLAN OF CARE
01/06/25 1418   Post-Acute Status   Post-Acute Authorization Placement   Post-Acute Placement Status Referrals Sent   Coverage [facility] Medicare   Discharge Delays None known at this time   Discharge Plan   Discharge Plan A Skilled Nursing Facility       Patient was accepted by [facility] in [place] for SNF placement.  Patient was denied placement by [facility], [facility], and [facility].   SW attempted to contact patient's daughter Scarmary beth, regarding SNF placement.     Patient's daughter did not answer, but SW was able to leave voicemail regarding accepting placement facility.  SW pending response and SNF choice via family.     SW will continue to follow and assist as needed.

## 2025-01-06 NOTE — PT/OT/SLP PROGRESS
"Physical Therapy Treatment    Patient Name:  Shireen Felix   MRN:  [id number]    Recommendations:     Discharge Recommendations: Moderate Intensity Therapy  Discharge Equipment Recommendations: to be determined by next level of care  Barriers to discharge: None    Assessment:     Shireen Felix is a 79 y.o. female admitted with a medical diagnosis of COVID-19.  She presents with the following impairments/functional limitations: weakness, impaired endurance, impaired functional mobility, gait instability, impaired balance, decreased safety awareness, decreased lower extremity function, decreased ROM, impaired cardiopulmonary response to activity, pain.    Rehab Prognosis: Good; patient would benefit from acute skilled PT services to address these deficits and reach maximum level of function.    Recent Surgery: * No surgery found *      Plan:     During this hospitalization, patient to be seen 3 x/week to address the identified rehab impairments via gait training, therapeutic activities, therapeutic exercises and progress toward the following goals:    Plan of Care Expires:  01/17/25    Subjective     Chief Complaint: Pt reported "I just found a sweet spot, I am finally comfortable in bed." Refused all PT intervention. C/o persistent coughing.   Patient/Family Comments/goals: none stated  Pain/Comfort:  Pain Rating 1: 3/10  Location - Side 1: Bilateral  Location - Orientation 1: lower  Location 1: abdomen  Pain Addressed 1: Reposition, Distraction  Pain Rating Post-Intervention 1: 3/10      Objective:     Communicated with nurse Powers and [facility] chart review prior to session.  Patient found right sidelying with peripheral IV, telemetry, PureWick, oxygen upon PT entry to room.     General Precautions: Standard, airborne, droplet, fall, contact  Orthopedic Precautions: N/A  Braces: N/A  Respiratory Status: Nasal cannula, flow 3 L/min     Functional Mobility:  Pt refused all functional mobility at this time, " "refused repositioning in bed.      AM-PAC 6 CLICK MOBILITY  Turning over in bed (including adjusting bedclothes, sheets and blankets)?: 1 (REF)  Sitting down on and standing up from a chair with arms (e.g., wheelchair, bedside commode, etc.): 1 (REF)  Moving from lying on back to sitting on the side of the bed?: 1 (REF)  Moving to and from a bed to a chair (including a wheelchair)?: 1 (REF)  Need to walk in hospital room?: 1 (REF)  Climbing 3-5 steps with a railing?: 1 (REF)  Basic Mobility Total Score: 6       Treatment & Education:  Reviewed role of PT in acute care and POC. Pt refused all OOB/EOB activity and supine TherEx, despite max encouragement from therapist. Educated on the importance of OOB/EOB activity for her recovery. Educated on importance of consistent participation with PT. Educated on importance of TherEx to maintain/regain strength, encouraged to complete supine TherEx (hip flex, hip abd/add, heel slides, quad sets, ankle pumps) throughout the day. Encouraged frequent position changes to reduce the risk of pressure injury. Encouraged to sit up in the chair for all meals. Reviewed "call don't fall" policy and increased risk of falling due to weakness, instructed to utilize call bell for assistance with all transfers. Pt agreeable to all requests.    Patient left right sidelying with all lines intact and call button in reach..    GOALS:   Multidisciplinary Problems       Physical Therapy Goals          Problem: Physical Therapy    Goal Priority Disciplines Outcome Interventions   Physical Therapy Goal     PT, PT/OT Progressing    Description: Goals to be met by 1/17/25.  1. Pt will complete bed mobility SBA.  2. Pt will complete sit to stand SBA.  3. Pt will ambulate 100ft SBA using RW.  4. Pt will increase AMPAC score by 2 points to progress functional mobility.                       Time Tracking:     PT Received On: 01/06/25  PT Start Time: 1505     PT Stop Time: 1513  PT Total Time (min): 8 min "     Billable Minutes: Therapeutic Activity 8min    Treatment Type: Treatment  PT/PTA: PT     Number of PTA visits since last PT visit: 0     01/06/2025

## 2025-01-07 PROBLEM — I50.22 CHRONIC SYSTOLIC HEART FAILURE: Status: ACTIVE | Noted: 2022-03-15

## 2025-01-07 LAB
ALBUMIN SERPL BCP-MCNC: 2.7 G/DL (ref 3.5–5.2)
ALP SERPL-CCNC: 53 U/L (ref 40–150)
ALT SERPL W/O P-5'-P-CCNC: 25 U/L (ref 10–44)
ANION GAP SERPL CALC-SCNC: 15 MMOL/L (ref 8–16)
AORTIC ROOT ANNULUS: 3.02 CM
ASCENDING AORTA: 2.9 CM
AST SERPL-CCNC: 30 U/L (ref 10–40)
AV INDEX (PROSTH): 0.7
AV MEAN GRADIENT: 7.3 MMHG
AV PEAK GRADIENT: 10.2 MMHG
AV VALVE AREA BY VELOCITY RATIO: 1.6 CM²
AV VALVE AREA: 2 CM²
AV VELOCITY RATIO: 0.56
BASOPHILS # BLD AUTO: 0.06 K/UL (ref 0–0.2)
BASOPHILS NFR BLD: 0.4 % (ref 0–1.9)
BILIRUB SERPL-MCNC: 0.3 MG/DL (ref 0.1–1)
BNP SERPL-MCNC: 157 PG/ML (ref 0–99)
BSA FOR ECHO PROCEDURE: 1.56 M2
BUN SERPL-MCNC: 48 MG/DL (ref 8–23)
CALCIUM SERPL-MCNC: 8.8 MG/DL (ref 8.7–10.5)
CHLORIDE SERPL-SCNC: 105 MMOL/L (ref 95–110)
CO2 SERPL-SCNC: 19 MMOL/L (ref 23–29)
CREAT SERPL-MCNC: 1.3 MG/DL (ref 0.5–1.4)
CV ECHO LV RWT: 0.83 CM
DIFFERENTIAL METHOD BLD: ABNORMAL
DOP CALC AO PEAK VEL: 1.6 M/S
DOP CALC AO VTI: 26.9 CM
DOP CALC LVOT AREA: 2.8 CM2
DOP CALC LVOT DIAMETER: 1.9 CM
DOP CALC LVOT PEAK VEL: 0.9 M/S
DOP CALC LVOT STROKE VOLUME: 53.6 CM3
DOP CALC RVOT PEAK VEL: 0.52 M/S
DOP CALC RVOT VTI: 10.7 CM
DOP CALCLVOT PEAK VEL VTI: 18.9 CM
E WAVE DECELERATION TIME: 194.03 MSEC
E/A RATIO: 0.65
E/E' RATIO: 5.45 M/S
ECHO LV POSTERIOR WALL: 1.2 CM (ref 0.6–1.1)
EOSINOPHIL # BLD AUTO: 0.1 K/UL (ref 0–0.5)
EOSINOPHIL NFR BLD: 0.6 % (ref 0–8)
ERYTHROCYTE [DISTWIDTH] IN BLOOD BY AUTOMATED COUNT: 14.8 % (ref 11.5–14.5)
EST. GFR  (NO RACE VARIABLE): 42 ML/MIN/1.73 M^2
FRACTIONAL SHORTENING: 34.5 % (ref 28–44)
GLUCOSE SERPL-MCNC: 85 MG/DL (ref 70–110)
HCT VFR BLD AUTO: 40.8 % (ref 37–48.5)
HGB BLD-MCNC: 13.2 G/DL (ref 12–16)
IMM GRANULOCYTES # BLD AUTO: 0.44 K/UL (ref 0–0.04)
IMM GRANULOCYTES NFR BLD AUTO: 2.8 % (ref 0–0.5)
INTERVENTRICULAR SEPTUM: 1.2 CM (ref 0.6–1.1)
IVC DIAMETER: 1.04 CM
IVRT: 77.07 MSEC
LA MAJOR: 3.73 CM
LA MINOR: 3.11 CM
LA WIDTH: 2.5 CM
LEFT ATRIUM SIZE: 2.27 CM
LEFT ATRIUM VOLUME INDEX: 10.4 ML/M2
LEFT ATRIUM VOLUME: 16.36 CM3
LEFT INTERNAL DIMENSION IN SYSTOLE: 1.9 CM (ref 2.1–4)
LEFT VENTRICLE DIASTOLIC VOLUME INDEX: 21.03 ML/M2
LEFT VENTRICLE DIASTOLIC VOLUME: 33.02 ML
LEFT VENTRICLE MASS INDEX: 66.4 G/M2
LEFT VENTRICLE SYSTOLIC VOLUME INDEX: 7.5 ML/M2
LEFT VENTRICLE SYSTOLIC VOLUME: 11.72 ML
LEFT VENTRICULAR INTERNAL DIMENSION IN DIASTOLE: 2.9 CM (ref 3.5–6)
LEFT VENTRICULAR MASS: 104.2 G
LV LATERAL E/E' RATIO: 5.45 M/S
LV SEPTAL E/E' RATIO: 5.45 M/S
LVED V (TEICH): 33.02 ML
LVES V (TEICH): 11.72 ML
LVOT MG: 1.68 MMHG
LVOT MV: 0.61 CM/S
LYMPHOCYTES # BLD AUTO: 3.2 K/UL (ref 1–4.8)
LYMPHOCYTES NFR BLD: 19.9 % (ref 18–48)
MAGNESIUM SERPL-MCNC: 1.8 MG/DL (ref 1.6–2.6)
MCH RBC QN AUTO: 31.1 PG (ref 27–31)
MCHC RBC AUTO-ENTMCNC: 32.4 G/DL (ref 32–36)
MCV RBC AUTO: 96 FL (ref 82–98)
MONOCYTES # BLD AUTO: 1.2 K/UL (ref 0.3–1)
MONOCYTES NFR BLD: 7.7 % (ref 4–15)
MV PEAK A VEL: 0.93 M/S
MV PEAK E VEL: 0.6 M/S
MV STENOSIS PRESSURE HALF TIME: 56.27 MS
MV VALVE AREA P 1/2 METHOD: 3.91 CM2
NEUTROPHILS # BLD AUTO: 10.9 K/UL (ref 1.8–7.7)
NEUTROPHILS NFR BLD: 68.6 % (ref 38–73)
NRBC BLD-RTO: 0 /100 WBC
OHS QRS DURATION: 112 MS
OHS QTC CALCULATION: 521 MS
PHOSPHATE SERPL-MCNC: 3.7 MG/DL (ref 2.7–4.5)
PISA TR MAX VEL: 2.7 M/S
PLATELET # BLD AUTO: 200 K/UL (ref 150–450)
PMV BLD AUTO: 10.5 FL (ref 9.2–12.9)
POTASSIUM SERPL-SCNC: 4.3 MMOL/L (ref 3.5–5.1)
PROT SERPL-MCNC: 6 G/DL (ref 6–8.4)
PV MEAN GRADIENT: 1 MMHG
RA MAJOR: 3.91 CM
RA PRESSURE ESTIMATED: 3 MMHG
RA WIDTH: 2.5 CM
RBC # BLD AUTO: 4.24 M/UL (ref 4–5.4)
RV TB RVSP: 6 MMHG
SODIUM SERPL-SCNC: 139 MMOL/L (ref 136–145)
STJ: 2.99 CM
TDI LATERAL: 0.11 M/S
TDI SEPTAL: 0.11 M/S
TDI: 0.11 M/S
TR MAX PG: 29 MMHG
TRICUSPID ANNULAR PLANE SYSTOLIC EXCURSION: 1.59 CM
TROPONIN I SERPL DL<=0.01 NG/ML-MCNC: 0.01 NG/ML (ref 0–0.03)
TV REST PULMONARY ARTERY PRESSURE: 32 MMHG
WBC # BLD AUTO: 15.81 K/UL (ref 3.9–12.7)
Z-SCORE OF LEFT VENTRICULAR DIMENSION IN END DIASTOLE: -4.42
Z-SCORE OF LEFT VENTRICULAR DIMENSION IN END SYSTOLE: -3.06

## 2025-01-07 PROCEDURE — 25000242 PHARM REV CODE 250 ALT 637 W/ HCPCS: Performed by: NURSE PRACTITIONER

## 2025-01-07 PROCEDURE — 27000221 HC OXYGEN, UP TO 24 HOURS

## 2025-01-07 PROCEDURE — 97530 THERAPEUTIC ACTIVITIES: CPT

## 2025-01-07 PROCEDURE — 94640 AIRWAY INHALATION TREATMENT: CPT

## 2025-01-07 PROCEDURE — 25000003 PHARM REV CODE 250: Performed by: NURSE PRACTITIONER

## 2025-01-07 PROCEDURE — 63600175 PHARM REV CODE 636 W HCPCS: Performed by: NURSE PRACTITIONER

## 2025-01-07 PROCEDURE — 63600175 PHARM REV CODE 636 W HCPCS: Performed by: STUDENT IN AN ORGANIZED HEALTH CARE EDUCATION/TRAINING PROGRAM

## 2025-01-07 PROCEDURE — 99900035 HC TECH TIME PER 15 MIN (STAT)

## 2025-01-07 PROCEDURE — 83735 ASSAY OF MAGNESIUM: CPT | Performed by: STUDENT IN AN ORGANIZED HEALTH CARE EDUCATION/TRAINING PROGRAM

## 2025-01-07 PROCEDURE — 25000003 PHARM REV CODE 250: Performed by: STUDENT IN AN ORGANIZED HEALTH CARE EDUCATION/TRAINING PROGRAM

## 2025-01-07 PROCEDURE — 85025 COMPLETE CBC W/AUTO DIFF WBC: CPT | Performed by: STUDENT IN AN ORGANIZED HEALTH CARE EDUCATION/TRAINING PROGRAM

## 2025-01-07 PROCEDURE — 27000207 HC ISOLATION

## 2025-01-07 PROCEDURE — 83880 ASSAY OF NATRIURETIC PEPTIDE: CPT | Performed by: NURSE PRACTITIONER

## 2025-01-07 PROCEDURE — 80053 COMPREHEN METABOLIC PANEL: CPT | Performed by: STUDENT IN AN ORGANIZED HEALTH CARE EDUCATION/TRAINING PROGRAM

## 2025-01-07 PROCEDURE — 25000242 PHARM REV CODE 250 ALT 637 W/ HCPCS: Performed by: STUDENT IN AN ORGANIZED HEALTH CARE EDUCATION/TRAINING PROGRAM

## 2025-01-07 PROCEDURE — 36415 COLL VENOUS BLD VENIPUNCTURE: CPT | Performed by: NURSE PRACTITIONER

## 2025-01-07 PROCEDURE — 84484 ASSAY OF TROPONIN QUANT: CPT | Performed by: NURSE PRACTITIONER

## 2025-01-07 PROCEDURE — 25000003 PHARM REV CODE 250: Performed by: FAMILY MEDICINE

## 2025-01-07 PROCEDURE — 94761 N-INVAS EAR/PLS OXIMETRY MLT: CPT

## 2025-01-07 PROCEDURE — 21400001 HC TELEMETRY ROOM

## 2025-01-07 PROCEDURE — 63600175 PHARM REV CODE 636 W HCPCS: Mod: JZ | Performed by: STUDENT IN AN ORGANIZED HEALTH CARE EDUCATION/TRAINING PROGRAM

## 2025-01-07 PROCEDURE — 25500020 PHARM REV CODE 255: Performed by: STUDENT IN AN ORGANIZED HEALTH CARE EDUCATION/TRAINING PROGRAM

## 2025-01-07 PROCEDURE — 84100 ASSAY OF PHOSPHORUS: CPT | Performed by: STUDENT IN AN ORGANIZED HEALTH CARE EDUCATION/TRAINING PROGRAM

## 2025-01-07 PROCEDURE — 36415 COLL VENOUS BLD VENIPUNCTURE: CPT | Performed by: STUDENT IN AN ORGANIZED HEALTH CARE EDUCATION/TRAINING PROGRAM

## 2025-01-07 RX ORDER — ALPRAZOLAM 0.25 MG/1
0.25 TABLET ORAL 3 TIMES DAILY PRN
Status: DISCONTINUED | OUTPATIENT
Start: 2025-01-07 | End: 2025-01-10

## 2025-01-07 RX ORDER — MORPHINE SULFATE 2 MG/ML
2 INJECTION, SOLUTION INTRAMUSCULAR; INTRAVENOUS ONCE
Status: DISCONTINUED | OUTPATIENT
Start: 2025-01-07 | End: 2025-01-07

## 2025-01-07 RX ORDER — POLYETHYLENE GLYCOL 3350 17 G/17G
17 POWDER, FOR SOLUTION ORAL DAILY
Status: DISCONTINUED | OUTPATIENT
Start: 2025-01-08 | End: 2025-01-28 | Stop reason: HOSPADM

## 2025-01-07 RX ORDER — MORPHINE SULFATE 2 MG/ML
2 INJECTION, SOLUTION INTRAMUSCULAR; INTRAVENOUS EVERY 4 HOURS PRN
Status: DISCONTINUED | OUTPATIENT
Start: 2025-01-07 | End: 2025-01-24

## 2025-01-07 RX ORDER — DEXAMETHASONE SODIUM PHOSPHATE 4 MG/ML
6 INJECTION, SOLUTION INTRA-ARTICULAR; INTRALESIONAL; INTRAMUSCULAR; INTRAVENOUS; SOFT TISSUE EVERY 24 HOURS
Status: COMPLETED | OUTPATIENT
Start: 2025-01-08 | End: 2025-01-10

## 2025-01-07 RX ORDER — FUROSEMIDE 10 MG/ML
40 INJECTION INTRAMUSCULAR; INTRAVENOUS DAILY
Status: DISCONTINUED | OUTPATIENT
Start: 2025-01-08 | End: 2025-01-08

## 2025-01-07 RX ORDER — IPRATROPIUM BROMIDE AND ALBUTEROL SULFATE 2.5; .5 MG/3ML; MG/3ML
3 SOLUTION RESPIRATORY (INHALATION) EVERY 4 HOURS PRN
Status: DISCONTINUED | OUTPATIENT
Start: 2025-01-07 | End: 2025-01-17

## 2025-01-07 RX ORDER — FUROSEMIDE 10 MG/ML
40 INJECTION INTRAMUSCULAR; INTRAVENOUS ONCE
Status: COMPLETED | OUTPATIENT
Start: 2025-01-07 | End: 2025-01-07

## 2025-01-07 RX ORDER — LEVALBUTEROL INHALATION SOLUTION 0.63 MG/3ML
1.25 SOLUTION RESPIRATORY (INHALATION) EVERY 6 HOURS
Status: DISCONTINUED | OUTPATIENT
Start: 2025-01-07 | End: 2025-01-07

## 2025-01-07 RX ORDER — LEVALBUTEROL INHALATION SOLUTION 0.63 MG/3ML
1.25 SOLUTION RESPIRATORY (INHALATION) ONCE
Status: COMPLETED | OUTPATIENT
Start: 2025-01-07 | End: 2025-01-07

## 2025-01-07 RX ORDER — DEXAMETHASONE SODIUM PHOSPHATE 4 MG/ML
4 INJECTION, SOLUTION INTRA-ARTICULAR; INTRALESIONAL; INTRAMUSCULAR; INTRAVENOUS; SOFT TISSUE EVERY 24 HOURS
Status: DISCONTINUED | OUTPATIENT
Start: 2025-01-08 | End: 2025-01-07

## 2025-01-07 RX ADMIN — IOHEXOL 100 ML: 350 INJECTION, SOLUTION INTRAVENOUS at 11:01

## 2025-01-07 RX ADMIN — MORPHINE SULFATE 2 MG: 2 INJECTION, SOLUTION INTRAMUSCULAR; INTRAVENOUS at 09:01

## 2025-01-07 RX ADMIN — ALPRAZOLAM 0.25 MG: 0.25 TABLET ORAL at 09:01

## 2025-01-07 RX ADMIN — BENZONATATE 100 MG: 100 CAPSULE ORAL at 09:01

## 2025-01-07 RX ADMIN — GABAPENTIN 200 MG: 100 CAPSULE ORAL at 09:01

## 2025-01-07 RX ADMIN — MUPIROCIN: 20 OINTMENT TOPICAL at 09:01

## 2025-01-07 RX ADMIN — LEVETIRACETAM 500 MG: 500 TABLET, FILM COATED ORAL at 09:01

## 2025-01-07 RX ADMIN — ROFLUMILAST 500 MCG: 500 TABLET ORAL at 09:01

## 2025-01-07 RX ADMIN — METHOCARBAMOL 500 MG: 500 TABLET ORAL at 09:01

## 2025-01-07 RX ADMIN — PANTOPRAZOLE SODIUM 40 MG: 40 TABLET, DELAYED RELEASE ORAL at 09:01

## 2025-01-07 RX ADMIN — FUROSEMIDE 40 MG: 10 INJECTION, SOLUTION INTRAMUSCULAR; INTRAVENOUS at 08:01

## 2025-01-07 RX ADMIN — OXYCODONE HYDROCHLORIDE AND ACETAMINOPHEN 500 MG: 500 TABLET ORAL at 08:01

## 2025-01-07 RX ADMIN — SACUBITRIL AND VALSARTAN 1 TABLET: 24; 26 TABLET, FILM COATED ORAL at 08:01

## 2025-01-07 RX ADMIN — GUAIFENESIN AND DEXTROMETHORPHAN HYDROBROMIDE 1 TABLET: 600; 30 TABLET, EXTENDED RELEASE ORAL at 08:01

## 2025-01-07 RX ADMIN — GABAPENTIN 200 MG: 100 CAPSULE ORAL at 08:01

## 2025-01-07 RX ADMIN — HYDROXYZINE HYDROCHLORIDE 25 MG: 25 TABLET ORAL at 12:01

## 2025-01-07 RX ADMIN — FLUTICASONE PROPIONATE 100 MCG: 50 SPRAY, METERED NASAL at 09:01

## 2025-01-07 RX ADMIN — GABAPENTIN 200 MG: 100 CAPSULE ORAL at 02:01

## 2025-01-07 RX ADMIN — OXYCODONE HYDROCHLORIDE AND ACETAMINOPHEN 500 MG: 500 TABLET ORAL at 09:01

## 2025-01-07 RX ADMIN — ATORVASTATIN CALCIUM 40 MG: 40 TABLET, FILM COATED ORAL at 09:01

## 2025-01-07 RX ADMIN — PANTOPRAZOLE SODIUM 40 MG: 40 TABLET, DELAYED RELEASE ORAL at 08:01

## 2025-01-07 RX ADMIN — SACUBITRIL AND VALSARTAN 1 TABLET: 24; 26 TABLET, FILM COATED ORAL at 09:01

## 2025-01-07 RX ADMIN — METHYLPREDNISOLONE SODIUM SUCCINATE 60 MG: 40 INJECTION, POWDER, FOR SOLUTION INTRAMUSCULAR; INTRAVENOUS at 05:01

## 2025-01-07 RX ADMIN — MONTELUKAST 10 MG: 10 TABLET, FILM COATED ORAL at 09:01

## 2025-01-07 RX ADMIN — ESCITALOPRAM OXALATE 20 MG: 10 TABLET, FILM COATED ORAL at 09:01

## 2025-01-07 RX ADMIN — ENOXAPARIN SODIUM 50 MG: 60 INJECTION SUBCUTANEOUS at 09:01

## 2025-01-07 RX ADMIN — SODIUM BICARBONATE 650 MG TABLET 1300 MG: at 09:01

## 2025-01-07 RX ADMIN — BENZONATATE 100 MG: 100 CAPSULE ORAL at 08:01

## 2025-01-07 RX ADMIN — ENOXAPARIN SODIUM 50 MG: 60 INJECTION SUBCUTANEOUS at 08:01

## 2025-01-07 RX ADMIN — CLOPIDOGREL BISULFATE 75 MG: 75 TABLET ORAL at 09:01

## 2025-01-07 RX ADMIN — IPRATROPIUM BROMIDE AND ALBUTEROL SULFATE 3 ML: 2.5; .5 SOLUTION RESPIRATORY (INHALATION) at 07:01

## 2025-01-07 RX ADMIN — BUDESONIDE 0.5 MG: 0.5 INHALANT RESPIRATORY (INHALATION) at 07:01

## 2025-01-07 RX ADMIN — GUAIFENESIN AND DEXTROMETHORPHAN HYDROBROMIDE 1 TABLET: 600; 30 TABLET, EXTENDED RELEASE ORAL at 11:01

## 2025-01-07 RX ADMIN — LEVALBUTEROL HYDROCHLORIDE 1.25 MG: 0.63 SOLUTION RESPIRATORY (INHALATION) at 09:01

## 2025-01-07 RX ADMIN — ACETAMINOPHEN 650 MG: 325 TABLET ORAL at 12:01

## 2025-01-07 RX ADMIN — BENZONATATE 100 MG: 100 CAPSULE ORAL at 02:01

## 2025-01-07 RX ADMIN — METHOCARBAMOL 500 MG: 500 TABLET ORAL at 12:01

## 2025-01-07 RX ADMIN — IPRATROPIUM BROMIDE AND ALBUTEROL SULFATE 3 ML: 2.5; .5 SOLUTION RESPIRATORY (INHALATION) at 03:01

## 2025-01-07 RX ADMIN — THERA TABS 1 TABLET: TAB at 09:01

## 2025-01-07 RX ADMIN — METOPROLOL TARTRATE 50 MG: 50 TABLET, FILM COATED ORAL at 09:01

## 2025-01-07 RX ADMIN — LEVETIRACETAM 500 MG: 500 TABLET, FILM COATED ORAL at 08:01

## 2025-01-07 RX ADMIN — METOPROLOL TARTRATE 50 MG: 50 TABLET, FILM COATED ORAL at 08:01

## 2025-01-07 NOTE — CONSULTS
[facility] - Tele[facility] ([facility])  Pulmonology  Consult Note    Patient Name: Shireen Felix  MRN: [id number]  Admission Date: 12/31/2024  Hospital Length of Stay: 6 days  Code Status: Full Code  Attending Physician: Everardo Graf MD  Primary Care Provider: Laron Chaudhari MD   Principal Problem: COVID-19    Inpatient consult to Pulmonology  Consult performed by: Rosalie Camp NP  Consult ordered by: Emi Jenkins NP        Subjective:     HPI:  Shireen Felix is an 79 yr old female with a history of multiple comorbidities, see below, CHF with EF 30-40% as well as previously diagnosed pulmonary fibrosis, followed by pulmonology oupatient. She was admitted on 12/31 with COVID-19 infection. She has received nebulizers, steroids, antibiotics, with minimal improvement in dyspnea and cough. Pulmonology has been consulted for respiratory distress.     Patient Active Problem List    Diagnosis Date Noted    Acute bronchitis and bronchiolitis 12/31/2024    Severe persistent asthma without complication 12/05/2024    Nasal pruritus 12/05/2024    Chronic nonallergic rhinitis 06/11/2024    Drug reaction 06/11/2024    Coccydynia 10/25/2023    Chronic diarrhea 09/21/2023    Weight loss 09/21/2023    Dehydration 08/20/2023    Acute anemia 08/16/2023    Vaginal atrophy 08/15/2023    Diarrhea 08/13/2023    Sacroiliac joint pain 07/28/2023    CKD (chronic kidney disease) 06/03/2023    Hypokalemia 06/03/2023    Hypomagnesemia 06/03/2023    Vertigo 05/22/2023    Primary snoring 05/10/2023    Fatigue 05/05/2023    Lumbar radiculopathy, chronic 04/26/2023    Multiple pulmonary nodules 04/26/2023    Bilateral renal cysts 04/26/2023    Embolism and thrombosis of unspecified artery 02/01/2023    Sacroiliitis, not elsewhere classified 03/15/2022    Chronic diastolic heart failure 03/15/2022    COVID-19 02/21/2022    Hyperglycemia due to type 2 diabetes mellitus 08/19/2021    Long term current use of insulin  08/19/2021    Mixed hyperlipidemia 08/19/2021    Moderate recurrent major depression 08/19/2021    Vitamin D deficiency 08/19/2021    Polypharmacy 08/19/2021    Multiple neurological symptoms 08/19/2021    DDD (degenerative disc disease), thoracolumbar 08/19/2021    DDD (degenerative disc disease), thoracic 08/19/2021    DDD (degenerative disc disease), lumbosacral 08/19/2021    DDD (degenerative disc disease), lumbar 08/19/2021    DDD (degenerative disc disease), cervical 08/19/2021    History of cerebral aneurysm 08/19/2021    Hx of craniotomy 08/19/2021    Temporal lobe epilepsy 08/19/2021    Syncope 08/19/2021    Spinal stenosis 08/19/2021    Fluctuating blood pressure 08/19/2021    Bilateral carpal tunnel syndrome 03/04/2021    Cervical radiculopathy 03/04/2021    Cubital tunnel syndrome on right 03/04/2021    Spontaneous ecchymosis 08/05/2020    Shoulder arthritis 07/09/2020    Age-related osteoporosis without current pathological fracture 01/28/2020    Decreased GFR 12/24/2019    History of stroke 12/23/2019    Seizure 12/23/2019    Normocytic anemia 12/23/2019    Hyponatremia 12/23/2019    Debility 12/23/2019    Osteoarthritis of left wrist 12/23/2019    Chronic back pain 12/23/2019    Chronic pain syndrome 12/23/2019    Lumbar radiculopathy 11/12/2019    Mild protein-calorie malnutrition 09/30/2019    Other constipation 09/27/2019    Primary insomnia 08/30/2019    Hypotension 08/09/2019    Depression 08/08/2019    Overactive bladder 08/08/2019    Neuroforaminal stenosis of lumbar spine 08/06/2019    Coronary artery disease of native artery of native heart with stable angina pectoris     Recurrent falls 08/04/2019    DM (diabetes mellitus) type II controlled with renal manifestation 08/04/2019    Mucopurulent chronic bronchitis 07/26/2019    UIP (usual interstitial pneumonitis) 07/26/2019    Hx of completed stroke 06/06/2019    Nasal septal perforation 05/31/2019    Postherpetic neuralgia 05/31/2019    Iron  deficiency anemia 08/14/2018    History of cerebral aneurysm repair 08/01/2018    Spinal stenosis of lumbar region with neurogenic claudication 08/01/2018    Elevated IgE level 03/05/2018    Imbalance 02/15/2018    GERD (gastroesophageal reflux disease) 03/26/2015    History of tobacco abuse 03/26/2015    HTN (hypertension) 01/12/2015    Conductive hearing loss, unilateral 12/13/2013    Chronic otitis media 12/13/2013    Perforation of right tympanic membrane 12/13/2013         Past Medical History:   Diagnosis Date    Abnormal ECG 08/05/2019    Acute bronchitis and bronchiolitis 12/31/2024    Aneurysm     Anticoagulant long-term use     Arthritis     CAD in native artery 08/05/2019    Diabetes mellitus     Fall 10/10/2019    Formatting of this note might be different from the original. Found sitting on floor next to bed last night Mild confusion today Does not recall falling or how she ended up on floor UA today Labs yesterday unremarkable    Glaucoma     Hypertension     Pulmonary fibrosis     Seizures     Shingles 05/27/2017    Stroke        Past Surgical History:   Procedure Laterality Date    BRAIN SURGERY      CARDIAC CATHETERIZATION      CATHETERIZATION OF BOTH LEFT AND RIGHT HEART N/A 5/17/2024    Procedure: CATHETERIZATION, HEART, BOTH LEFT AND RIGHT;  Surgeon: Rachelle Sarabia MD;  Location: [facility] CATH LAB;  Service: Cardiology;  Laterality: N/A;    COLONOSCOPY N/A 09/21/2023    Procedure: COLONOSCOPY;  Surgeon: Joanna Leal MD;  Location: [facility] ENDO;  Service: Endoscopy;  Laterality: N/A;    CORONARY ANGIOPLASTY      EPIDURAL STEROID INJECTION INTO CERVICAL SPINE N/A 2/7/2024    Procedure: Cervical IL XANDER, Level C6/7, RN IV sedation;  Surgeon: Francisco Oshea MD;  Location: [facility] PAIN MGT;  Service: Pain Management;  Laterality: N/A;    EPIDURAL STEROID INJECTION INTO LUMBAR SPINE Bilateral 11/12/2019    Procedure: TF XANDER L4/5;  Surgeon: Desean Dias MD;  Location: [facility] PAIN MGT;  Service: Pain  Management;  Laterality: Bilateral;    HYSTERECTOMY      INJECTION OF ANESTHETIC AGENT AROUND MEDIAL BRANCH NERVES INNERVATING LUMBAR FACET JOINT Bilateral 01/31/2020    Procedure: Bilateral L3-5 MBB;  Surgeon: Desean Dias MD;  [facility];  Service: Pain Management;  Laterality: Bilateral;    INJECTION OF ANESTHETIC AGENT INTO SACROILIAC JOINT Right 11/16/2021    Procedure: Right BLOCK, SACROILIAC JOINT Right GTB with RN IV sedation;  Surgeon: Yao Fulton MD;  Location: [facility] PAIN MGT;  Service: Pain Management;  Laterality: Right;    INJECTION OF ANESTHETIC AGENT INTO SACROILIAC JOINT Bilateral 07/28/2023    Procedure: Bilateral GT bursa + bilateral SIJ injection;  Surgeon: Francisco Oshea MD;  Location: [facility] PAIN MGT;  Service: Pain Management;  Laterality: Bilateral;    INJECTION OF JOINT Bilateral 07/09/2020    Procedure: Bilateral shoulder GH Joint injection with local;  Surgeon: Desean Dias MD;  Location: [facility] PAIN MGT;  Service: Pain Management;  Laterality: Bilateral;    INJECTION OF JOINT Bilateral 07/28/2023    Procedure: Bilateral GT bursa + bilateral SIJ injection NEEDS ADDITIONAL SEDATION AND MORE TIME BEFORE INJECTION RN IV Sedation;  Surgeon: Francisco Oshea MD;  Location: [facility] PAIN MGT;  Service: Pain Management;  Laterality: Bilateral;    INJECTION OF JOINT N/A 10/25/2023    Procedure: Sacrococcygeal joint injection;  Surgeon: Francisco Oshea MD;  Location: [facility] PAIN MGT;  Service: Pain Management;  Laterality: N/A;    OOPHORECTOMY      SELECTIVE INJECTION OF ANESTHETIC AGENT AROUND LUMBAR SPINAL NERVE ROOT BY TRANSFORAMINAL APPROACH Bilateral 04/26/2023    Procedure: Bilateral L4/5 TF XANDER RN IV Sedation;  Surgeon: Francisco Oshea MD;  Location: [facility] PAIN MGT;  Service: Pain Management;  Laterality: Bilateral;    TRANSFORAMINAL EPIDURAL INJECTION OF STEROID Bilateral 07/23/2019    Procedure: Bilateral L3/4 Transforaminal Epidural Steroid Injection;  Surgeon: Desean Dias MD;   Location: [facility] PAIN MGT;  Service: Pain Management;  Laterality: Bilateral;    TRANSFORAMINAL EPIDURAL INJECTION OF STEROID Bilateral 03/10/2020    Procedure: Right T12/L1 TF XANDER with local;  Surgeon: Desean Dias MD;  Location: [facility] PAIN MGT;  Service: Pain Management;  Laterality: Bilateral;    TRANSFORAMINAL EPIDURAL INJECTION OF STEROID Right 2020    Procedure: Right T12/L1 TFESI Covid day of procedure;  Surgeon: Desean Dias MD;  Location: [facility] PAIN T;  Service: Pain Management;  Laterality: Right;       Review of patient's allergies indicates:   Allergen Reactions    Penicillins Anaphylaxis, Hives, Shortness Of Breath and Swelling    Penicillin     Adhesive Rash    Doxycycline Nausea Only    Oxycodone Other (See Comments)     Fatigue      Sulfa (sulfonamide antibiotics) Itching       Family History       Problem Relation (Age of Onset)    Breast cancer Maternal Aunt, Maternal Aunt, Maternal Aunt    No Known Problems Mother, Father          Tobacco Use    Smoking status: Former     Current packs/day: 0.00     Average packs/day: 1 pack/day for 42.3 years (42.3 ttl pk-yrs)     Types: Cigarettes     Start date:      Quit date: 2004     Years since quittin.7    Smokeless tobacco: Former   Substance and Sexual Activity    Alcohol use: No    Drug use: Never    Sexual activity: Not Currently     Partners: Male         Review of Systems   Constitutional:  Positive for activity change and fatigue.   Respiratory:  Positive for cough, chest tightness and shortness of breath. Negative for wheezing.    Cardiovascular:  Positive for chest pain. Negative for palpitations and leg swelling.        Pleuritic   Gastrointestinal:  Positive for abdominal pain. Negative for constipation, diarrhea, nausea and vomiting.        With coughing fits   Neurological:  Positive for weakness. Negative for dizziness, seizures and headaches.   Psychiatric/Behavioral:  The patient is nervous/anxious.      Objective:      Vital Signs (Most Recent):  Temp: 97 °F (36.1 °C) (01/07/25 0730)  Pulse: 60 (01/07/25 0909)  Resp: (!) 28 (01/07/25 0909)  BP: (!) 140/64 (01/07/25 0730)  SpO2: 95 % (01/07/25 0909) Vital Signs (24h Range):  Temp:  [97 °F (36.1 °C)-98.6 °F (37 °C)] 97 °F (36.1 °C)  Pulse:  [51-82] 60  Resp:  [18-34] 28  SpO2:  [93 %-100 %] 95 %  BP: ()/(53-72) 140/64     Weight: 55.4 kg (122 lb 2.2 oz)  Body mass index is 20.96 kg/m².      Intake/Output Summary (Last 24 hours) at 1/7/2025 1031  Last data filed at 1/7/2025 0631  Gross per 24 hour   Intake --   Output 1200 ml   Net -1200 ml        Physical Exam  Constitutional:       General: She is in acute distress.      Appearance: She is ill-appearing.   HENT:      Head: Normocephalic and atraumatic.      Nose: Nose normal.      Mouth/Throat:      Mouth: Mucous membranes are dry.   Eyes:      Pupils: Pupils are equal, round, and reactive to light.   Cardiovascular:      Rate and Rhythm: Regular rhythm. Tachycardia present.      Pulses: Normal pulses.      Heart sounds: Normal heart sounds.   Pulmonary:      Effort: Pulmonary effort is normal. Tachypnea present.      Breath sounds: Rales present. No wheezing.      Comments: Dyspnea with coughing fits, comfortable at rest otherwise  Abdominal:      General: Bowel sounds are normal. There is no distension.      Palpations: Abdomen is soft.      Tenderness: There is no abdominal tenderness.   Musculoskeletal:         General: No swelling.   Skin:     General: Skin is warm and dry.   Neurological:      General: No focal deficit present.      Mental Status: She is alert and oriented to person, place, and time. Mental status is at baseline.      Motor: Weakness present.   Psychiatric:         Mood and Affect: Mood is anxious.         Speech: Speech is rapid and pressured.          Vents:  Oxygen Concentration (%): 36 (01/07/25 0715)    Lines/Drains/Airways       Drain  Duration             Female External Urinary Catheter w/  Suction 12/31/24 2230 6 days              Peripheral Intravenous Line  Duration                  Peripheral IV - Single Lumen 01/04/25 1317 22 G Left;Posterior;Proximal Forearm 2 days                    Significant Labs:    CBC/Anemia Profile:  Recent Labs   Lab 01/06/25  0453 01/06/25  1610 01/07/25  0544   WBC 12.55  --  15.81*   HGB 12.7  --  13.2   HCT 41.0  --  40.8     --  200   MCV 98  --  96   RDW 14.6*  --  14.8*   FERRITIN  --  146  --         Chemistries:  Recent Labs   Lab 01/06/25  0453 01/07/25  0544    139   K 5.5* 4.3    105   CO2 20* 19*   BUN 32* 48*   CREATININE 0.8 1.3   CALCIUM 8.4* 8.8   ALBUMIN 2.5* 2.7*   PROT 5.7* 6.0   BILITOT 0.3 0.3   ALKPHOS 47 53   ALT 22 25   AST 27 30   MG 2.0 1.8   PHOS 3.0 3.7       All pertinent labs within the past 24 hours have been reviewed.    Significant Imaging:   I have reviewed all pertinent imaging results/findings within the past 24 hours.    ABG  Recent Labs   Lab 01/06/25  1835   PH 7.500*   PO2 469*   PCO2 26.7*   HCO3 20.8*   BE -2     Assessment/Plan:     Pulmonary  UIP (usual interstitial pneumonitis)  Pulm fibrosis hx- followed by pulmonology outpatient  -Will need close pulm f/u after discharge    Cardiac/Vascular  Chronic systolic heart failure  Previous EF 30-40%  Repeat Echo pending  Does not seem volume up on exam- consider stopping Lasix    ID  * COVID-19  Patient is identified as High risk for severe complications of COVID 19 based on COVID risk score of 10   Initiate standard COVID protocols; COVID-19 testing ,Infection Control notification  and isolation- respiratory, contact and droplet per protocol    -Continue steroids x 10 days total, stop date 1/10  -Given hx of pulm fibrosis, likely won't respond to bronchodilators- scheduled nebs stopped, changed to prn  -Guaifenesin changed to Guaifenesin DM to see if that will help her coughing fits  -New Echo pending- does not seem volume overloaded on exam, consider stopping  Lasix depending on BNP and repeat Echo  -CTA Chest pending  -Xanax and Morphine prn tachypnea and anxiety, educated patient about slow breathing/pursed lip breathing  -ABG has not shown hypoxia, hyperventilation with low   -Discussed case with hospital medicine      Thank you for your consult. I will follow-up with patient. Please contact us if you have any additional questions.     Rosalie Camp NP  Pulmonology  [facility] - Telemetry ([facility])

## 2025-01-07 NOTE — ASSESSMENT & PLAN NOTE
Pulm fibrosis hx- followed by pulmonology outpatient  -Will need close pulm f/u after discharge

## 2025-01-07 NOTE — ASSESSMENT & PLAN NOTE
Creatine stable for now. BMP reviewed- noted Estimated Creatinine Clearance: 29.4 mL/min (based on SCr of 1.3 mg/dL). according to latest data. Based on current GFR, CKD stage is stage 4 - GFR 15-29.  Monitor UOP and serial BMP and adjust therapy as needed. Renally dose meds. Avoid nephrotoxic medications and procedures.  -Creatinine 1.5 on admission however 3 months ago was 1.2    Recent Labs     01/05/25  0531 01/06/25  0453 01/07/25  0544   CREATININE 0.8 0.8 1.3         -Due to GFR, will need to dc ranexa for now and decrease dose of gabapentin  -Hold spironolactone   -Continue Entresto   -Monitor creatinine

## 2025-01-07 NOTE — ASSESSMENT & PLAN NOTE
Patient is identified as High risk for severe complications of COVID 19 based on COVID risk score of 10   Initiate standard COVID protocols; COVID-19 testing ,Infection Control notification  and isolation- respiratory, contact and droplet per protocol    -Continue steroids x 10 days total, stop date 1/10  -Given hx of pulm fibrosis, likely won't respond to bronchodilators- scheduled nebs stopped, changed to prn  -Guaifenesin changed to Guaifenesin DM to see if that will help her coughing fits  -New Echo pending- does not seem volume overloaded on exam, consider stopping Lasix depending on BNP and repeat Echo  -CTA Chest pending  -Xanax and Morphine prn tachypnea and anxiety, educated patient about slow breathing/pursed lip breathing  -ABG has not shown hypoxia, hyperventilation with low CO2  -Discussed case with hospital medicine

## 2025-01-07 NOTE — ASSESSMENT & PLAN NOTE
Patient is identified as Severe COVID-19 based on hypoxemia with O2 saturations <94% on room air or on ambulation   Initiate standard COVID protocols; COVID-19 testing ,Infection Control notification  and isolation- respiratory, contact and droplet per protocol    Diagnostics: CBC, CMP, Ferritin, CRP, Troponin, and Portable CXR    Management: Initiate targeted therapy with Remdesivir, 200mg IV x1, followed by 100mg IV daily x5 days total and Anticoagulation, Patient admitted to non-critical care unit- Will initiate full dose anticoagulation with Weight based lovenox 1mg/kg IV q12, Maintain oxygen saturations 92-96% via Nasal Cannula  LPM and monitor with continuous/intermittent pulse oximetry. , Inhaled bronchodilators as needed for shortness of breath., and Continuous cardiac monitoring.    Advance Care Planning  Current advance care plan has been discussed with patient/family/POA and patient currently wishes Full Code.    Cellcept Pregnancy And Lactation Text: This medication is Pregnancy Category D and isn't considered safe during pregnancy. It is unknown if this medication is excreted in breast milk. Azelaic Acid Pregnancy And Lactation Text: This medication is considered safe during pregnancy and breast feeding. Quinolones Counseling:  I discussed with the patient the risks of fluoroquinolones including but not limited to GI upset, allergic reaction, drug rash, diarrhea, dizziness, photosensitivity, yeast infections, liver function test abnormalities, tendonitis/tendon rupture. Infliximab Pregnancy And Lactation Text: This medication is Pregnancy Category B and is considered safe during pregnancy. It is unknown if this medication is excreted in breast milk. Erivedge Pregnancy And Lactation Text: This medication is Pregnancy Category X and is absolutely contraindicated during pregnancy. It is unknown if it is excreted in breast milk. Sotyktu Pregnancy And Lactation Text: There is insufficient data to evaluate whether or not Sotyktu is safe to use during pregnancy.? ?It is not known if Sotyktu passes into breast milk and whether or not it is safe to use when breastfeeding.?? Drysol Counseling:  I discussed with the patient the risks of drysol/aluminum chloride including but not limited to skin rash, itching, irritation, burning. Finasteride Female Counseling: Finasteride Counseling:  I discussed with the patient the risks of use of finasteride including but not limited to decreased libido and sexual dysfunction. Explained the teratogenic nature of the medication and stressed the importance of not getting pregnant during treatment. All of the patient's questions and concerns were addressed. Soolantra Counseling: I discussed with the patients the risks of topial Soolantra. This is a medicine which decreases the number of mites and inflammation in the skin. You experience burning, stinging, eye irritation or allergic reactions.  Please call our office if you develop any problems from using this medication. Propranolol Pregnancy And Lactation Text: This medication is Pregnancy Category C and it isn't known if it is safe during pregnancy. It is excreted in breast milk. Stelara Counseling:  I discussed with the patient the risks of ustekinumab including but not limited to immunosuppression, malignancy, posterior leukoencephalopathy syndrome, and serious infections.  The patient understands that monitoring is required including a PPD at baseline and must alert us or the primary physician if symptoms of infection or other concerning signs are noted. Vtama Pregnancy And Lactation Text: It is unknown if this medication can cause problems during pregnancy and breastfeeding. Use Enhanced Medication Counseling?: No Klisyri Counseling:  I discussed with the patient the risks of Klisyri including but not limited to erythema, scaling, itching, weeping, crusting, and pain. Topical Metronidazole Counseling: Metronidazole is a topical antibiotic medication. You may experience burning, stinging, redness, or allergic reactions.  Please call our office if you develop any problems from using this medication. Clindamycin Pregnancy And Lactation Text: This medication can be used in pregnancy if certain situations. Clindamycin is also present in breast milk. Isotretinoin Counseling: Patient should get monthly blood tests, not donate blood, not drive at night if vision affected, not share medication, and not undergo elective surgery for 6 months after tx completed. Side effects reviewed, pt to contact office should one occur. Ivermectin Pregnancy And Lactation Text: This medication is Pregnancy Category C and it isn't known if it is safe during pregnancy. It is also excreted in breast milk. Clofazimine Counseling:  I discussed with the patient the risks of clofazimine including but not limited to skin and eye pigmentation, liver damage, nausea/vomiting, gastrointestinal bleeding and allergy. Cibinqo Counseling: I discussed with the patient the risks of Cibinqo therapy including but not limited to common cold, nausea, headache, cold sores, increased blood CPK levels, dizziness, UTIs, fatigue, acne, and vomitting. Live vaccines should be avoided.  This medication has been linked to serious infections; higher rate of mortality; malignancy and lymphoproliferative disorders; major adverse cardiovascular events; thrombosis; thrombocytopenia and lymphopenia; lipid elevations; and retinal detachment. Fluconazole Pregnancy And Lactation Text: This medication is Pregnancy Category C and it isn't know if it is safe during pregnancy. It is also excreted in breast milk. Cimetidine Counseling:  I discussed with the patient the risks of Cimetidine including but not limited to gynecomastia, headache, diarrhea, nausea, drowsiness, arrhythmias, pancreatitis, skin rashes, psychosis, bone marrow suppression and kidney toxicity. Glycopyrrolate Pregnancy And Lactation Text: This medication is Pregnancy Category B and is considered safe during pregnancy. It is unknown if it is excreted breast milk. Olanzapine Counseling- I discussed with the patient the common side effects of olanzapine including but are not limited to: lack of energy, dry mouth, increased appetite, sleepiness, tremor, constipation, dizziness, changes in behavior, or restlessness.  Explained that teenagers are more likely to experience headaches, abdominal pain, pain in the arms or legs, tiredness, and sleepiness.  Serious side effects include but are not limited: increased risk of death in elderly patients who are confused, have memory loss, or dementia-related psychosis; hyperglycemia; increased cholesterol and triglycerides; and weight gain. Picato Pregnancy And Lactation Text: This medication is Pregnancy Category C. It is unknown if this medication is excreted in breast milk. Olanzapine Pregnancy And Lactation Text: This medication is pregnancy category C.   There are no adequate and well controlled trials with olanzapine in pregnant females.  Olanzapine should be used during pregnancy only if the potential benefit justifies the potential risk to the fetus.   In a study in lactating healthy women, olanzapine was excreted in breast milk.  It is recommended that women taking olanzapine should not breast feed. Cyclophosphamide Counseling:  I discussed with the patient the risks of cyclophosphamide including but not limited to hair loss, hormonal abnormalities, decreased fertility, abdominal pain, diarrhea, nausea and vomiting, bone marrow suppression and infection. The patient understands that monitoring is required while taking this medication. Griseofulvin Counseling:  I discussed with the patient the risks of griseofulvin including but not limited to photosensitivity, cytopenia, liver damage, nausea/vomiting and severe allergy.  The patient understands that this medication is best absorbed when taken with a fatty meal (e.g., ice cream or french fries). Protopic Counseling: Patient may experience a mild burning sensation during topical application. Protopic is not approved in children less than 2 years of age. There have been case reports of hematologic and skin malignancies in patients using topical calcineurin inhibitors although causality is questionable. Benzoyl Peroxide Counseling: Patient counseled that medicine may cause skin irritation and bleach clothing.  In the event of skin irritation, the patient was advised to reduce the amount of the drug applied or use it less frequently.   The patient verbalized understanding of the proper use and possible adverse effects of benzoyl peroxide.  All of the patient's questions and concerns were addressed. Cibinqo Pregnancy And Lactation Text: It is unknown if this medication will adversely affect pregnancy or breast feeding.  You should not take this medication if you are currently pregnant or planning a pregnancy or while breastfeeding. Finasteride Pregnancy And Lactation Text: This medication is absolutely contraindicated during pregnancy. It is unknown if it is excreted in breast milk. Libtayo Counseling- I discussed with the patient the risks of Libtayo including but not limited to nausea, vomiting, diarrhea, and bone or muscle pain.  The patient verbalized understanding of the proper use and possible adverse effects of Libtayo.  All of the patient's questions and concerns were addressed. Zoryve Counseling:  I discussed with the patient that Zoryve is not for use in the eyes, mouth or vagina. The most commonly reported side effects include diarrhea, headache, insomnia, application site pain, upper respiratory tract infections, and urinary tract infections.  All of the patient's questions and concerns were addressed. SSKI Counseling:  I discussed with the patient the risks of SSKI including but not limited to thyroid abnormalities, metallic taste, GI upset, fever, headache, acne, arthralgias, paraesthesias, lymphadenopathy, easy bleeding, arrhythmias, and allergic reaction. Rituxan Counseling:  I discussed with the patient the risks of Rituxan infusions. Side effects can include infusion reactions, severe drug rashes including mucocutaneous reactions, reactivation of latent hepatitis and other infections and rarely progressive multifocal leukoencephalopathy.  All of the patient's questions and concerns were addressed. Xeljanz Counseling: I discussed with the patient the risks of Xeljanz therapy including increased risk of infection, liver issues, headache, diarrhea, or cold symptoms. Live vaccines should be avoided. They were instructed to call if they have any problems. Dupixent Counseling: I discussed with the patient the risks of dupilumab including but not limited to eye infection and irritation, cold sores, injection site reactions, worsening of asthma, allergic reactions and increased risk of parasitic infection.  Live vaccines should be avoided while taking dupilumab. Dupilumab will also interact with certain medications such as warfarin and cyclosporine. The patient understands that monitoring is required and they must alert us or the primary physician if symptoms of infection or other concerning signs are noted. Soolantra Pregnancy And Lactation Text: This medication is Pregnancy Category C. This medication is considered safe during breast feeding. Taltz Counseling: I discussed with the patient the risks of ixekizumab including but not limited to immunosuppression, serious infections, worsening of inflammatory bowel disease and drug reactions.  The patient understands that monitoring is required including a PPD at baseline and must alert us or the primary physician if symptoms of infection or other concerning signs are noted. Elidel Counseling: Patient may experience a mild burning sensation during topical application. Elidel is not approved in children less than 2 years of age. There have been case reports of hematologic and skin malignancies in patients using topical calcineurin inhibitors although causality is questionable. Libtayo Pregnancy And Lactation Text: This medication is contraindicated in pregnancy and when breast feeding. Topical Retinoid counseling:  Patient advised to apply a pea-sized amount only at bedtime and wait 30 minutes after washing their face before applying.  If too drying, patient may add a non-comedogenic moisturizer. The patient verbalized understanding of the proper use and possible adverse effects of retinoids.  All of the patient's questions and concerns were addressed. Dupixent Pregnancy And Lactation Text: This medication likely crosses the placenta but the risk for the fetus is uncertain. This medication is excreted in breast milk. Xeledez Pregnancy And Lactation Text: This medication is Pregnancy Category D and is not considered safe during pregnancy.  The risk during breast feeding is also uncertain. Klisyri Pregnancy And Lactation Text: It is unknown if this medication can harm a developing fetus or if it is excreted in breast milk. Sski Pregnancy And Lactation Text: This medication is Pregnancy Category D and isn't considered safe during pregnancy. It is excreted in breast milk. Topical Metronidazole Pregnancy And Lactation Text: This medication is Pregnancy Category B and considered safe during pregnancy.  It is also considered safe to use while breastfeeding. Cimetidine Pregnancy And Lactation Text: This medication is Pregnancy Category B and is considered safe during pregnancy. It is also excreted in breast milk and breast feeding isn't recommended. Clofazimine Pregnancy And Lactation Text: This medication is Pregnancy Category C and isn't considered safe during pregnancy. It is excreted in breast milk. Hydroxychloroquine Counseling:  I discussed with the patient that a baseline ophthalmologic exam is needed at the start of therapy and every year thereafter while on therapy. A CBC may also be warranted for monitoring.  The side effects of this medication were discussed with the patient, including but not limited to agranulocytosis, aplastic anemia, seizures, rashes, retinopathy, and liver toxicity. Patient instructed to call the office should any adverse effect occur.  The patient verbalized understanding of the proper use and possible adverse effects of Plaquenil.  All the patient's questions and concerns were addressed. Isotretinoin Pregnancy And Lactation Text: This medication is Pregnancy Category X and is considered extremely dangerous during pregnancy. It is unknown if it is excreted in breast milk. Doxycycline Counseling:  Patient counseled regarding possible photosensitivity and increased risk for sunburn.  Patient instructed to avoid sunlight, if possible.  When exposed to sunlight, patients should wear protective clothing, sunglasses, and sunscreen.  The patient was instructed to call the office immediately if the following severe adverse effects occur:  hearing changes, easy bruising/bleeding, severe headache, or vision changes.  The patient verbalized understanding of the proper use and possible adverse effects of doxycycline.  All of the patient's questions and concerns were addressed. Hydroxychloroquine Pregnancy And Lactation Text: This medication has been shown to cause fetal harm but it isn't assigned a Pregnancy Risk Category. There are small amounts excreted in breast milk. High Dose Vitamin A Counseling: Side effects reviewed, pt to contact office should one occur. Doxepin Counseling:  Patient advised that the medication is sedating and not to drive a car after taking this medication. Patient informed of potential adverse effects including but not limited to dry mouth, urinary retention, and blurry vision.  The patient verbalized understanding of the proper use and possible adverse effects of doxepin.  All of the patient's questions and concerns were addressed. Rifampin Counseling: I discussed with the patient the risks of rifampin including but not limited to liver damage, kidney damage, red-orange body fluids, nausea/vomiting and severe allergy. Litfulo Counseling: I discussed with the patient the risks of Litfulo therapy including but not limited to upper respiratory tract infections, shingles, cold sores, and nausea. Live vaccines should be avoided.  This medication has been linked to serious infections; higher rate of mortality; malignancy and lymphoproliferative disorders; major adverse cardiovascular events; thrombosis; gastrointestinal perforations; neutropenia; lymphopenia; anemia; liver enzyme elevations; and lipid elevations. Benzoyl Peroxide Pregnancy And Lactation Text: This medication is Pregnancy Category C. It is unknown if benzoyl peroxide is excreted in breast milk. Griseofulvin Pregnancy And Lactation Text: This medication is Pregnancy Category X and is known to cause serious birth defects. It is unknown if this medication is excreted in breast milk but breast feeding should be avoided. Birth Control Pills Counseling: Birth Control Pill Counseling: I discussed with the patient the potential side effects of OCPs including but not limited to increased risk of stroke, heart attack, thrombophlebitis, deep venous thrombosis, hepatic adenomas, breast changes, GI upset, headaches, and depression.  The patient verbalized understanding of the proper use and possible adverse effects of OCPs. All of the patient's questions and concerns were addressed. Protopic Pregnancy And Lactation Text: This medication is Pregnancy Category C. It is unknown if this medication is excreted in breast milk when applied topically. Oral Minoxidil Counseling- I discussed with the patient the risks of oral minoxidil including but not limited to shortness of breath, swelling of the feet or ankles, dizziness, lightheadedness, unwanted hair growth and allergic reaction.  The patient verbalized understanding of the proper use and possible adverse effects of oral minoxidil.  All of the patient's questions and concerns were addressed. Rituxan Pregnancy And Lactation Text: This medication is Pregnancy Category C and it isn't know if it is safe during pregnancy. It is unknown if this medication is excreted in breast milk but similar antibodies are known to be excreted. Cyclophosphamide Pregnancy And Lactation Text: This medication is Pregnancy Category D and it isn't considered safe during pregnancy. This medication is excreted in breast milk. Adbry Counseling: I discussed with the patient the risks of tralokinumab including but not limited to eye infection and irritation, cold sores, injection site reactions, worsening of asthma, allergic reactions and increased risk of parasitic infection.  Live vaccines should be avoided while taking tralokinumab. The patient understands that monitoring is required and they must alert us or the primary physician if symptoms of infection or other concerning signs are noted. Siliq Counseling:  I discussed with the patient the risks of Siliq including but not limited to new or worsening depression, suicidal thoughts and behavior, immunosuppression, malignancy, posterior leukoencephalopathy syndrome, and serious infections.  The patient understands that monitoring is required including a PPD at baseline and must alert us or the primary physician if symptoms of infection or other concerning signs are noted. There is also a special program designed to monitor depression which is required with Siliq. Odomzo Counseling- I discussed with the patient the risks of Odomzo including but not limited to nausea, vomiting, diarrhea, constipation, weight loss, changes in the sense of taste, decreased appetite, muscle spasms, and hair loss.  The patient verbalized understanding of the proper use and possible adverse effects of Odomzo.  All of the patient's questions and concerns were addressed. Azithromycin Counseling:  I discussed with the patient the risks of azithromycin including but not limited to GI upset, allergic reaction, drug rash, diarrhea, and yeast infections. Zyclara Counseling:  I discussed with the patient the risks of imiquimod including but not limited to erythema, scaling, itching, weeping, crusting, and pain.  Patient understands that the inflammatory response to imiquimod is variable from person to person and was educated regarded proper titration schedule.  If flu-like symptoms develop, patient knows to discontinue the medication and contact us. Enbrel Counseling:  I discussed with the patient the risks of etanercept including but not limited to myelosuppression, immunosuppression, autoimmune hepatitis, demyelinating diseases, lymphoma, and infections.  The patient understands that monitoring is required including a PPD at baseline and must alert us or the primary physician if symptoms of infection or other concerning signs are noted. Colchicine Counseling:  Patient counseled regarding adverse effects including but not limited to stomach upset (nausea, vomiting, stomach pain, or diarrhea).  Patient instructed to limit alcohol consumption while taking this medication.  Colchicine may reduce blood counts especially with prolonged use.  The patient understands that monitoring of kidney function and blood counts may be required, especially at baseline. The patient verbalized understanding of the proper use and possible adverse effects of colchicine.  All of the patient's questions and concerns were addressed. Thalidomide Counseling: I discussed with the patient the risks of thalidomide including but not limited to birth defects, anxiety, weakness, chest pain, dizziness, cough and severe allergy. Taltz Pregnancy And Lactation Text: The risk during pregnancy and breastfeeding is uncertain with this medication. Doxycycline Pregnancy And Lactation Text: This medication is Pregnancy Category D and not consider safe during pregnancy. It is also excreted in breast milk but is considered safe for shorter treatment courses. Minoxidil Counseling: Minoxidil is a topical medication which can increase blood flow where it is applied. It is uncertain how this medication increases hair growth. Side effects are uncommon and include stinging and allergic reactions. Topical Steroids Counseling: I discussed with the patient that prolonged use of topical steroids can result in the increased appearance of superficial blood vessels (telangiectasias), lightening (hypopigmentation) and thinning of the skin (atrophy).  Patient understands to avoid using high potency steroids in skin folds, the groin or the face.  The patient verbalized understanding of the proper use and possible adverse effects of topical steroids.  All of the patient's questions and concerns were addressed. Minoxidil Pregnancy And Lactation Text: This medication has not been assigned a Pregnancy Risk Category but animal studies failed to show danger with the topical medication. It is unknown if the medication is excreted in breast milk. Topical Steroids Applications Pregnancy And Lactation Text: Most topical steroids are considered safe to use during pregnancy and lactation.  Any topical steroid applied to the breast or nipple should be washed off before breastfeeding. Erythromycin Counseling:  I discussed with the patient the risks of erythromycin including but not limited to GI upset, allergic reaction, drug rash, diarrhea, increase in liver enzymes, and yeast infections. High Dose Vitamin A Pregnancy And Lactation Text: High dose vitamin A therapy is contraindicated during pregnancy and breast feeding. Litfulo Pregnancy And Lactation Text: Based on animal studies, Lifulo may cause embryo-fetal harm when administered to pregnant women.  The medication should not be used in pregnancy.  Breastfeeding is not recommended during treatment. Itraconazole Counseling:  I discussed with the patient the risks of itraconazole including but not limited to liver damage, nausea/vomiting, neuropathy, and severe allergy.  The patient understands that this medication is best absorbed when taken with acidic beverages such as non-diet cola or ginger ale.  The patient understands that monitoring is required including baseline LFTs and repeat LFTs at intervals.  The patient understands that they are to contact us or the primary physician if concerning signs are noted. Low Dose Naltrexone Counseling- I discussed with the patient the potential risks and side effects of low dose naltrexone including but not limited to: more vivid dreams, headaches, nausea, vomiting, abdominal pain, fatigue, dizziness, and anxiety. Acitretin Counseling:  I discussed with the patient the risks of acitretin including but not limited to hair loss, dry lips/skin/eyes, liver damage, hyperlipidemia, depression/suicidal ideation, photosensitivity.  Serious rare side effects can include but are not limited to pancreatitis, pseudotumor cerebri, bony changes, clot formation/stroke/heart attack.  Patient understands that alcohol is contraindicated since it can result in liver toxicity and significantly prolong the elimination of the drug by many years. Doxepin Pregnancy And Lactation Text: This medication is Pregnancy Category C and it isn't known if it is safe during pregnancy. It is also excreted in breast milk and breast feeding isn't recommended. Qbrexza Counseling:  I discussed with the patient the risks of Qbrexza including but not limited to headache, mydriasis, blurred vision, dry eyes, nasal dryness, dry mouth, dry throat, dry skin, urinary hesitation, and constipation.  Local skin reactions including erythema, burning, stinging, and itching can also occur. Birth Control Pills Pregnancy And Lactation Text: This medication should be avoided if pregnant and for the first 30 days post-partum. Oral Minoxidil Pregnancy And Lactation Text: This medication should only be used when clearly needed if you are pregnant, attempting to become pregnant or breast feeding. Cyclosporine Counseling:  I discussed with the patient the risks of cyclosporine including but not limited to hypertension, gingival hyperplasia,myelosuppression, immunosuppression, liver damage, kidney damage, neurotoxicity, lymphoma, and serious infections. The patient understands that monitoring is required including baseline blood pressure, CBC, CMP, lipid panel and uric acid, and then 1-2 times monthly CMP and blood pressure. Carac Counseling:  I discussed with the patient the risks of Carac including but not limited to erythema, scaling, itching, weeping, crusting, and pain. Rifampin Pregnancy And Lactation Text: This medication is Pregnancy Category C and it isn't know if it is safe during pregnancy. It is also excreted in breast milk and should not be used if you are breast feeding. Adbry Pregnancy And Lactation Text: It is unknown if this medication will adversely affect pregnancy or breast feeding. Cyclosporine Pregnancy And Lactation Text: This medication is Pregnancy Category C and it isn't know if it is safe during pregnancy. This medication is excreted in breast milk. Wartpeel Counseling:  I discussed with the patient the risks of Wartpeel including but not limited to erythema, scaling, itching, weeping, crusting, and pain. Otezla Counseling: The side effects of Otezla were discussed with the patient, including but not limited to worsening or new depression, weight loss, diarrhea, nausea, upper respiratory tract infection, and headache. Patient instructed to call the office should any adverse effect occur.  The patient verbalized understanding of the proper use and possible adverse effects of Otezla.  All the patient's questions and concerns were addressed. Sarecycline Counseling: Patient advised regarding possible photosensitivity and discoloration of the teeth, skin, lips, tongue and gums.  Patient instructed to avoid sunlight, if possible.  When exposed to sunlight, patients should wear protective clothing, sunglasses, and sunscreen.  The patient was instructed to call the office immediately if the following severe adverse effects occur:  hearing changes, easy bruising/bleeding, severe headache, or vision changes.  The patient verbalized understanding of the proper use and possible adverse effects of sarecycline.  All of the patient's questions and concerns were addressed. Carac Pregnancy And Lactation Text: This medication is Pregnancy Category X and contraindicated in pregnancy and in women who may become pregnant. It is unknown if this medication is excreted in breast milk. Olumiant Counseling: I discussed with the patient the risks of Olumiant therapy including but not limited to upper respiratory tract infections, shingles, cold sores, and nausea. Live vaccines should be avoided.  This medication has been linked to serious infections; higher rate of mortality; malignancy and lymphoproliferative disorders; major adverse cardiovascular events; thrombosis; gastrointestinal perforations; neutropenia; lymphopenia; anemia; liver enzyme elevations; and lipid elevations. Bimzelx Counseling:  I discussed with the patient the risks of Bimzelx including but not limited to depression, immunosuppression, allergic reactions and infections.  The patient understands that monitoring is required including a PPD at baseline and must alert us or the primary physician if symptoms of infection or other concerning signs are noted. Qbrexza Pregnancy And Lactation Text: There is no available data on Qbrexza use in pregnant women.  There is no available data on Qbrexza use in lactation. Eucrisa Counseling: Patient may experience a mild burning sensation during topical application. Eucrisa is not approved in children less than 2 years of age. Spironolactone Counseling: Patient advised regarding risks of diarrhea, abdominal pain, hyperkalemia, birth defects (for female patients), liver toxicity and renal toxicity. The patient may need blood work to monitor liver and kidney function and potassium levels while on therapy. The patient verbalized understanding of the proper use and possible adverse effects of spironolactone.  All of the patient's questions and concerns were addressed. Tazorac Counseling:  Patient advised that medication is irritating and drying.  Patient may need to apply sparingly and wash off after an hour before eventually leaving it on overnight.  The patient verbalized understanding of the proper use and possible adverse effects of tazorac.  All of the patient's questions and concerns were addressed. Tremfya Counseling: I discussed with the patient the risks of guselkumab including but not limited to immunosuppression, serious infections, and drug reactions.  The patient understands that monitoring is required including a PPD at baseline and must alert us or the primary physician if symptoms of infection or other concerning signs are noted. Azithromycin Pregnancy And Lactation Text: This medication is considered safe during pregnancy and is also secreted in breast milk. Bactrim Counseling:  I discussed with the patient the risks of sulfa antibiotics including but not limited to GI upset, allergic reaction, drug rash, diarrhea, dizziness, photosensitivity, and yeast infections.  Rarely, more serious reactions can occur including but not limited to aplastic anemia, agranulocytosis, methemoglobinemia, blood dyscrasias, liver or kidney failure, lung infiltrates or desquamative/blistering drug rashes. Erythromycin Pregnancy And Lactation Text: This medication is Pregnancy Category B and is considered safe during pregnancy. It is also excreted in breast milk. Humira Counseling:  I discussed with the patient the risks of adalimumab including but not limited to myelosuppression, immunosuppression, autoimmune hepatitis, demyelinating diseases, lymphoma, and serious infections.  The patient understands that monitoring is required including a PPD at baseline and must alert us or the primary physician if symptoms of infection or other concerning signs are noted. Dapsone Counseling: I discussed with the patient the risks of dapsone including but not limited to hemolytic anemia, agranulocytosis, rashes, methemoglobinemia, kidney failure, peripheral neuropathy, headaches, GI upset, and liver toxicity.  Patients who start dapsone require monitoring including baseline LFTs and weekly CBCs for the first month, then every month thereafter.  The patient verbalized understanding of the proper use and possible adverse effects of dapsone.  All of the patient's questions and concerns were addressed. Topical Sulfur Applications Counseling: Topical Sulfur Counseling: Patient counseled that this medication may cause skin irritation or allergic reactions.  In the event of skin irritation, the patient was advised to reduce the amount of the drug applied or use it less frequently.   The patient verbalized understanding of the proper use and possible adverse effects of topical sulfur application.  All of the patient's questions and concerns were addressed. Detail Level: Simple Mirvaso Counseling: Mirvaso is a topical medication which can decrease superficial blood flow where applied. Side effects are uncommon and include stinging, redness and allergic reactions. Low Dose Naltrexone Pregnancy And Lactation Text: Naltrexone is pregnancy category C.  There have been no adequate and well-controlled studies in pregnant women.  It should be used in pregnancy only if the potential benefit justifies the potential risk to the fetus.   Limited data indicates that naltrexone is minimally excreted into breastmilk. Acitretin Pregnancy And Lactation Text: This medication is Pregnancy Category X and should not be given to women who are pregnant or may become pregnant in the future. This medication is excreted in breast milk. Hydroxyzine Counseling: Patient advised that the medication is sedating and not to drive a car after taking this medication.  Patient informed of potential adverse effects including but not limited to dry mouth, urinary retention, and blurry vision.  The patient verbalized understanding of the proper use and possible adverse effects of hydroxyzine.  All of the patient's questions and concerns were addressed. Rhofade Counseling: Rhofade is a topical medication which can decrease superficial blood flow where applied. Side effects are uncommon and include stinging, redness and allergic reactions. Ketoconazole Counseling:   Patient counseled regarding improving absorption with orange juice.  Adverse effects include but are not limited to breast enlargement, headache, diarrhea, nausea, upset stomach, liver function test abnormalities, taste disturbance, and stomach pain.  There is a rare possibility of liver failure that can occur when taking ketoconazole. The patient understands that monitoring of LFTs may be required, especially at baseline. The patient verbalized understanding of the proper use and possible adverse effects of ketoconazole.  All of the patient's questions and concerns were addressed. Calcipotriene Counseling:  I discussed with the patient the risks of calcipotriene including but not limited to erythema, scaling, itching, and irritation. Opioid Counseling: I discussed with the patient the potential side effects of opioids including but not limited to addiction, altered mental status, and depression. I stressed avoiding alcohol, benzodiazepines, muscle relaxants and sleep aids unless specifically okayed by a physician. The patient verbalized understanding of the proper use and possible adverse effects of opioids. All of the patient's questions and concerns were addressed. They were instructed to flush the remaining pills down the toilet if they did not need them for pain. Olumiant Pregnancy And Lactation Text: Based on animal studies, Olumiant may cause embryo-fetal harm when administered to pregnant women.  The medication should not be used in pregnancy.  Breastfeeding is not recommended during treatment. Bimzelx Pregnancy And Lactation Text: This medication crosses the placenta and the safety is uncertain during pregnancy. It is unknown if this medication is present in breast milk. Spironolactone Pregnancy And Lactation Text: This medication can cause feminization of the male fetus and should be avoided during pregnancy. The active metabolite is also found in breast milk. Sarecycline Pregnancy And Lactation Text: This medication is Pregnancy Category D and not consider safe during pregnancy. It is also excreted in breast milk. Otezla Pregnancy And Lactation Text: This medication is Pregnancy Category C and it isn't known if it is safe during pregnancy. It is unknown if it is excreted in breast milk. Methotrexate Counseling:  Patient counseled regarding adverse effects of methotrexate including but not limited to nausea, vomiting, abnormalities in liver function tests. Patients may develop mouth sores, rash, diarrhea, and abnormalities in blood counts. The patient understands that monitoring is required including LFT's and blood counts.  There is a rare possibility of scarring of the liver and lung problems that can occur when taking methotrexate. Persistent nausea, loss of appetite, pale stools, dark urine, cough, and shortness of breath should be reported immediately. Patient advised to discontinue methotrexate treatment at least three months before attempting to become pregnant.  I discussed the need for folate supplements while taking methotrexate.  These supplements can decrease side effects during methotrexate treatment. The patient verbalized understanding of the proper use and possible adverse effects of methotrexate.  All of the patient's questions and concerns were addressed. Tazorac Pregnancy And Lactation Text: This medication is not safe during pregnancy. It is unknown if this medication is excreted in breast milk. Tranexamic Acid Counseling:  Patient advised of the small risk of bleeding problems with tranexamic acid. They were also instructed to call if they developed any nausea, vomiting or diarrhea. All of the patient's questions and concerns were addressed. Simponi Counseling:  I discussed with the patient the risks of golimumab including but not limited to myelosuppression, immunosuppression, autoimmune hepatitis, demyelinating diseases, lymphoma, and serious infections.  The patient understands that monitoring is required including a PPD at baseline and must alert us or the primary physician if symptoms of infection or other concerning signs are noted. Hydroquinone Counseling:  Patient advised that medication may result in skin irritation, lightening (hypopigmentation), dryness, and burning.  In the event of skin irritation, the patient was advised to reduce the amount of the drug applied or use it less frequently.  Rarely, spots that are treated with hydroquinone can become darker (pseudoochronosis).  Should this occur, patient instructed to stop medication and call the office. The patient verbalized understanding of the proper use and possible adverse effects of hydroquinone.  All of the patient's questions and concerns were addressed. Tranexamic Acid Pregnancy And Lactation Text: It is unknown if this medication is safe during pregnancy or breast feeding. Topical Clindamycin Counseling: Patient counseled that this medication may cause skin irritation or allergic reactions.  In the event of skin irritation, the patient was advised to reduce the amount of the drug applied or use it less frequently.   The patient verbalized understanding of the proper use and possible adverse effects of clindamycin.  All of the patient's questions and concerns were addressed. Bactrim Pregnancy And Lactation Text: This medication is Pregnancy Category D and is known to cause fetal risk.  It is also excreted in breast milk. Calcipotriene Pregnancy And Lactation Text: The use of this medication during pregnancy or lactation is not recommended as there is insufficient data. Dapsone Pregnancy And Lactation Text: This medication is Pregnancy Category C and is not considered safe during pregnancy or breast feeding. Dutasteride Male Counseling: Dustasteride Counseling:  I discussed with the patient the risks of use of dutasteride including but not limited to decreased libido, decreased ejaculate volume, and gynecomastia. Women who can become pregnant should not handle medication.  All of the patient's questions and concerns were addressed. Niacinamide Counseling: I recommended taking niacin or niacinamide, also know as vitamin B3, twice daily. Recent evidence suggests that taking vitamin B3 (500 mg twice daily) can reduce the risk of actinic keratoses and non-melanoma skin cancers. Side effects of vitamin B3 include flushing and headache. Metronidazole Counseling:  I discussed with the patient the risks of metronidazole including but not limited to seizures, nausea/vomiting, a metallic taste in the mouth, nausea/vomiting and severe allergy. Xolair Counseling:  Patient informed of potential adverse effects including but not limited to fever, muscle aches, rash and allergic reactions.  The patient verbalized understanding of the proper use and possible adverse effects of Xolair.  All of the patient's questions and concerns were addressed. Mirvaso Pregnancy And Lactation Text: This medication has not been assigned a Pregnancy Risk Category. It is unknown if the medication is excreted in breast milk. Hydroxyzine Pregnancy And Lactation Text: This medication is not safe during pregnancy and should not be taken. It is also excreted in breast milk and breast feeding isn't recommended. Topical Sulfur Applications Pregnancy And Lactation Text: This medication is Pregnancy Category C and has an unknown safety profile during pregnancy. It is unknown if this topical medication is excreted in breast milk. Niacinamide Pregnancy And Lactation Text: These medications are considered safe during pregnancy. Azathioprine Counseling:  I discussed with the patient the risks of azathioprine including but not limited to myelosuppression, immunosuppression, hepatotoxicity, lymphoma, and infections.  The patient understands that monitoring is required including baseline LFTs, Creatinine, possible TPMP genotyping and weekly CBCs for the first month and then every 2 weeks thereafter.  The patient verbalized understanding of the proper use and possible adverse effects of azathioprine.  All of the patient's questions and concerns were addressed. Aklief counseling:  Patient advised to apply a pea-sized amount only at bedtime and wait 30 minutes after washing their face before applying.  If too drying, patient may add a non-comedogenic moisturizer.  The most commonly reported side effects including irritation, redness, scaling, dryness, stinging, burning, itching, and increased risk of sunburn.  The patient verbalized understanding of the proper use and possible adverse effects of retinoids.  All of the patient's questions and concerns were addressed. Metronidazole Pregnancy And Lactation Text: This medication is Pregnancy Category B and considered safe during pregnancy.  It is also excreted in breast milk. Opzelura Counseling:  I discussed with the patient the risks of Opzelura including but not limited to nasopharngitis, bronchitis, ear infection, eosinophila, hives, diarrhea, folliculitis, tonsillitis, and rhinorrhea.  Taken orally, this medication has been linked to serious infections; higher rate of mortality; malignancy and lymphoproliferative disorders; major adverse cardiovascular events; thrombosis; thrombocytopenia, anemia, and neutropenia; and lipid elevations. Opioid Pregnancy And Lactation Text: These medications can lead to premature delivery and should be avoided during pregnancy. These medications are also present in breast milk in small amounts. Xolair Pregnancy And Lactation Text: This medication is Pregnancy Category B and is considered safe during pregnancy. This medication is excreted in breast milk. Cantharidin Counseling:  I discussed with the patient the risks of Cantharidin including but not limited to pain, redness, burning, itching, and blistering. Dutasteride Female Counseling: Dutasteride Counseling:  I discussed with the patient the risks of use of dutasteride including but not limited to decreased libido and sexual dysfunction. Explained the teratogenic nature of the medication and stressed the importance of not getting pregnant during treatment. All of the patient's questions and concerns were addressed. Ketoconazole Pregnancy And Lactation Text: This medication is Pregnancy Category C and it isn't know if it is safe during pregnancy. It is also excreted in breast milk and breast feeding isn't recommended. Winlevi Counseling:  I discussed with the patient the risks of topical clascoterone including but not limited to erythema, scaling, itching, and stinging. Patient voiced their understanding. Methotrexate Pregnancy And Lactation Text: This medication is Pregnancy Category X and is known to cause fetal harm. This medication is excreted in breast milk. Oxybutynin Counseling:  I discussed with the patient the risks of oxybutynin including but not limited to skin rash, drowsiness, dry mouth, difficulty urinating, and blurred vision. Cimzia Counseling:  I discussed with the patient the risks of Cimzia including but not limited to immunosuppression, allergic reactions and infections.  The patient understands that monitoring is required including a PPD at baseline and must alert us or the primary physician if symptoms of infection or other concerning signs are noted. Rinvoq Counseling: I discussed with the patient the risks of Rinvoq therapy including but not limited to upper respiratory tract infections, shingles, cold sores, bronchitis, nausea, cough, fever, acne, and headache. Live vaccines should be avoided.  This medication has been linked to serious infections; higher rate of mortality; malignancy and lymphoproliferative disorders; major adverse cardiovascular events; thrombosis; thrombocytopenia, anemia, and neutropenia; lipid elevations; liver enzyme elevations; and gastrointestinal perforations. Tetracycline Counseling: Patient counseled regarding possible photosensitivity and increased risk for sunburn.  Patient instructed to avoid sunlight, if possible.  When exposed to sunlight, patients should wear protective clothing, sunglasses, and sunscreen.  The patient was instructed to call the office immediately if the following severe adverse effects occur:  hearing changes, easy bruising/bleeding, severe headache, or vision changes.  The patient verbalized understanding of the proper use and possible adverse effects of tetracycline.  All of the patient's questions and concerns were addressed. Patient understands to avoid pregnancy while on therapy due to potential birth defects. Skyrizi Counseling: I discussed with the patient the risks of risankizumab-rzaa including but not limited to immunosuppression, and serious infections.  The patient understands that monitoring is required including a PPD at baseline and must alert us or the primary physician if symptoms of infection or other concerning signs are noted. Prednisone Counseling:  I discussed with the patient the risks of prolonged use of prednisone including but not limited to weight gain, insomnia, osteoporosis, mood changes, diabetes, susceptibility to infection, glaucoma and high blood pressure.  In cases where prednisone use is prolonged, patients should be monitored with blood pressure checks, serum glucose levels and an eye exam.  Additionally, the patient may need to be placed on GI prophylaxis, PCP prophylaxis, and calcium and vitamin D supplementation and/or a bisphosphonate.  The patient verbalized understanding of the proper use and the possible adverse effects of prednisone.  All of the patient's questions and concerns were addressed. Terbinafine Counseling: Patient counseling regarding adverse effects of terbinafine including but not limited to headache, diarrhea, rash, upset stomach, liver function test abnormalities, itching, taste/smell disturbance, nausea, abdominal pain, and flatulence.  There is a rare possibility of liver failure that can occur when taking terbinafine.  The patient understands that a baseline LFT and kidney function test may be required. The patient verbalized understanding of the proper use and possible adverse effects of terbinafine.  All of the patient's questions and concerns were addressed. Cimzia Pregnancy And Lactation Text: This medication crosses the placenta but can be considered safe in certain situations. Cimzia may be excreted in breast milk. Cephalexin Counseling: I counseled the patient regarding use of cephalexin as an antibiotic for prophylactic and/or therapeutic purposes. Cephalexin (commonly prescribed under brand name Keflex) is a cephalosporin antibiotic which is active against numerous classes of bacteria, including most skin bacteria. Side effects may include nausea, diarrhea, gastrointestinal upset, rash, hives, yeast infections, and in rare cases, hepatitis, kidney disease, seizures, fever, confusion, neurologic symptoms, and others. Patients with severe allergies to penicillin medications are cautioned that there is about a 10% incidence of cross-reactivity with cephalosporins. When possible, patients with penicillin allergies should use alternatives to cephalosporins for antibiotic therapy. Albendazole Counseling:  I discussed with the patient the risks of albendazole including but not limited to cytopenia, kidney damage, nausea/vomiting and severe allergy.  The patient understands that this medication is being used in an off-label manner. Oxybutynin Pregnancy And Lactation Text: This medication is Pregnancy Category B and is considered safe during pregnancy. It is unknown if it is excreted in breast milk. Valtrex Counseling: I discussed with the patient the risks of valacyclovir including but not limited to kidney damage, nausea, vomiting and severe allergy.  The patient understands that if the infection seems to be worsening or is not improving, they are to call. Cantharidin Pregnancy And Lactation Text: This medication has not been proven safe during pregnancy. It is unknown if this medication is excreted in breast milk. Gabapentin Counseling: I discussed with the patient the risks of gabapentin including but not limited to dizziness, somnolence, fatigue and ataxia. Hyrimoz Counseling:  I discussed with the patient the risks of adalimumab including but not limited to myelosuppression, immunosuppression, autoimmune hepatitis, demyelinating diseases, lymphoma, and serious infections.  The patient understands that monitoring is required including a PPD at baseline and must alert us or the primary physician if symptoms of infection or other concerning signs are noted. Opzelura Pregnancy And Lactation Text: There is insufficient data to evaluate drug-associated risk for major birth defects, miscarriage, or other adverse maternal or fetal outcomes.  There is a pregnancy registry that monitors pregnancy outcomes in pregnant persons exposed to the medication during pregnancy.  It is unknown if this medication is excreted in breast milk.  Do not breastfeed during treatment and for about 4 weeks after the last dose. Aklief Pregnancy And Lactation Text: It is unknown if this medication is safe to use during pregnancy.  It is unknown if this medication is excreted in breast milk.  Breastfeeding women should use the topical cream on the smallest area of the skin for the shortest time needed while breastfeeding.  Do not apply to nipple and areola. Minocycline Counseling: Patient advised regarding possible photosensitivity and discoloration of the teeth, skin, lips, tongue and gums.  Patient instructed to avoid sunlight, if possible.  When exposed to sunlight, patients should wear protective clothing, sunglasses, and sunscreen.  The patient was instructed to call the office immediately if the following severe adverse effects occur:  hearing changes, easy bruising/bleeding, severe headache, or vision changes.  The patient verbalized understanding of the proper use and possible adverse effects of minocycline.  All of the patient's questions and concerns were addressed. Rinvoq Pregnancy And Lactation Text: Based on animal studies, Rinvoq may cause embryo-fetal harm when administered to pregnant women.  The medication should not be used in pregnancy.  Breastfeeding is not recommended during treatment and for 6 days after the last dose. Dutasteride Pregnancy And Lactation Text: This medication is absolutely contraindicated in women, especially during pregnancy and breast feeding. Feminization of male fetuses is possible if taking while pregnant. Winlevi Pregnancy And Lactation Text: This medication is considered safe during pregnancy and breastfeeding. Nsaids Counseling: NSAID Counseling: I discussed with the patient that NSAIDs should be taken with food. Prolonged use of NSAIDs can result in the development of stomach ulcers.  Patient advised to stop taking NSAIDs if abdominal pain occurs.  The patient verbalized understanding of the proper use and possible adverse effects of NSAIDs.  All of the patient's questions and concerns were addressed. Solaraze Counseling:  I discussed with the patient the risks of Solaraze including but not limited to erythema, scaling, itching, weeping, crusting, and pain. 5-Fu Counseling: 5-Fluorouracil Counseling:  I discussed with the patient the risks of 5-fluorouracil including but not limited to erythema, scaling, itching, weeping, crusting, and pain. Propranolol Counseling:  I discussed with the patient the risks of propranolol including but not limited to low heart rate, low blood pressure, low blood sugar, restlessness and increased cold sensitivity. They should call the office if they experience any of these side effects. Sotyktu Counseling:  I discussed the most common side effects of Sotyktu including: common cold, sore throat, sinus infections, cold sores, canker sores, folliculitis, and acne.? I also discussed more serious side effects of Sotyktu including but not limited to: serious allergic reactions; increased risk for infections such as TB; cancers such as lymphomas; rhabdomyolysis and elevated CPK; and elevated triglycerides and liver enzymes.? Solaraze Pregnancy And Lactation Text: This medication is Pregnancy Category B and is considered safe. There is some data to suggest avoiding during the third trimester. It is unknown if this medication is excreted in breast milk. Finasteride Male Counseling: Finasteride Counseling:  I discussed with the patient the risks of use of finasteride including but not limited to decreased libido, decreased ejaculate volume, gynecomastia, and depression. Women should not handle medication.  All of the patient's questions and concerns were addressed. Erivedge Counseling- I discussed with the patient the risks of Erivedge including but not limited to nausea, vomiting, diarrhea, constipation, weight loss, changes in the sense of taste, decreased appetite, muscle spasms, and hair loss.  The patient verbalized understanding of the proper use and possible adverse effects of Erivedge.  All of the patient's questions and concerns were addressed. VTAMA Counseling: I discussed with the patient that VTAMA is not for use in the eyes, mouth or mouth. They should call the office if they develop any signs of allergic reactions to VTAMA. The patient verbalized understanding of the proper use and possible adverse effects of VTAMA.  All of the patient's questions and concerns were addressed. Cosentyx Counseling:  I discussed with the patient the risks of Cosentyx including but not limited to worsening of Crohn's disease, immunosuppression, allergic reactions and infections.  The patient understands that monitoring is required including a PPD at baseline and must alert us or the primary physician if symptoms of infection or other concerning signs are noted. Arava Counseling:  Patient counseled regarding adverse effects of Arava including but not limited to nausea, vomiting, abnormalities in liver function tests. Patients may develop mouth sores, rash, diarrhea, and abnormalities in blood counts. The patient understands that monitoring is required including LFTs and blood counts.  There is a rare possibility of scarring of the liver and lung problems that can occur when taking methotrexate. Persistent nausea, loss of appetite, pale stools, dark urine, cough, and shortness of breath should be reported immediately. Patient advised to discontinue Arava treatment and consult with a physician prior to attempting conception. The patient will have to undergo a treatment to eliminate Arava from the body prior to conception. Valtrex Pregnancy And Lactation Text: this medication is Pregnancy Category B and is considered safe during pregnancy. This medication is not directly found in breast milk but it's metabolite acyclovir is present. Cephalexin Pregnancy And Lactation Text: This medication is Pregnancy Category B and considered safe during pregnancy.  It is also excreted in breast milk but can be used safely for shorter doses. Bexarotene Counseling:  I discussed with the patient the risks of bexarotene including but not limited to hair loss, dry lips/skin/eyes, liver abnormalities, hyperlipidemia, pancreatitis, depression/suicidal ideation, photosensitivity, drug rash/allergic reactions, hypothyroidism, anemia, leukopenia, infection, cataracts, and teratogenicity.  Patient understands that they will need regular blood tests to check lipid profile, liver function tests, white blood cell count, thyroid function tests and pregnancy test if applicable. Imiquimod Counseling:  I discussed with the patient the risks of imiquimod including but not limited to erythema, scaling, itching, weeping, crusting, and pain.  Patient understands that the inflammatory response to imiquimod is variable from person to person and was educated regarded proper titration schedule.  If flu-like symptoms develop, patient knows to discontinue the medication and contact us. Topical Ketoconazole Counseling: Patient counseled that this medication may cause skin irritation or allergic reactions.  In the event of skin irritation, the patient was advised to reduce the amount of the drug applied or use it less frequently.   The patient verbalized understanding of the proper use and possible adverse effects of ketoconazole.  All of the patient's questions and concerns were addressed. Clindamycin Counseling: I counseled the patient regarding use of clindamycin as an antibiotic for prophylactic and/or therapeutic purposes. Clindamycin is active against numerous classes of bacteria, including skin bacteria. Side effects may include nausea, diarrhea, gastrointestinal upset, rash, hives, yeast infections, and in rare cases, colitis. Ivermectin Counseling:  Patient instructed to take medication on an empty stomach with a full glass of water.  Patient informed of potential adverse effects including but not limited to nausea, diarrhea, dizziness, itching, and swelling of the extremities or lymph nodes.  The patient verbalized understanding of the proper use and possible adverse effects of ivermectin.  All of the patient's questions and concerns were addressed. Bexarotene Pregnancy And Lactation Text: This medication is Pregnancy Category X and should not be given to women who are pregnant or may become pregnant. This medication should not be used if you are breast feeding. Ilumya Counseling: I discussed with the patient the risks of tildrakizumab including but not limited to immunosuppression, malignancy, posterior leukoencephalopathy syndrome, and serious infections.  The patient understands that monitoring is required including a PPD at baseline and must alert us or the primary physician if symptoms of infection or other concerning signs are noted. Picato Counseling:  I discussed with the patient the risks of Picato including but not limited to erythema, scaling, itching, weeping, crusting, and pain. Fluconazole Counseling:  Patient counseled regarding adverse effects of fluconazole including but not limited to headache, diarrhea, nausea, upset stomach, liver function test abnormalities, taste disturbance, and stomach pain.  There is a rare possibility of liver failure that can occur when taking fluconazole.  The patient understands that monitoring of LFTs and kidney function test may be required, especially at baseline. The patient verbalized understanding of the proper use and possible adverse effects of fluconazole.  All of the patient's questions and concerns were addressed. Glycopyrrolate Counseling:  I discussed with the patient the risks of glycopyrrolate including but not limited to skin rash, drowsiness, dry mouth, difficulty urinating, and blurred vision. Infliximab Counseling:  I discussed with the patient the risks of infliximab including but not limited to myelosuppression, immunosuppression, autoimmune hepatitis, demyelinating diseases, lymphoma, and serious infections.  The patient understands that monitoring is required including a PPD at baseline and must alert us or the primary physician if symptoms of infection or other concerning signs are noted. Nsaids Pregnancy And Lactation Text: These medications are considered safe up to 30 weeks gestation. It is excreted in breast milk. Cellcept Counseling:  I discussed with the patient the risks of mycophenolate mofetil including but not limited to infection/immunosuppression, GI upset, hypokalemia, hypercholesterolemia, bone marrow suppression, lymphoproliferative disorders, malignancy, GI ulceration/bleed/perforation, colitis, interstitial lung disease, kidney failure, progressive multifocal leukoencephalopathy, and birth defects.  The patient understands that monitoring is required including a baseline creatinine and regular CBC testing. In addition, patient must alert us immediately if symptoms of infection or other concerning signs are noted. Azelaic Acid Counseling: Patient counseled that medicine may cause skin irritation and to avoid applying near the eyes.  In the event of skin irritation, the patient was advised to reduce the amount of the drug applied or use it less frequently.   The patient verbalized understanding of the proper use and possible adverse effects of azelaic acid.  All of the patient's questions and concerns were addressed.

## 2025-01-07 NOTE — PLAN OF CARE
Per Mary, with [facility], all they have is semi-private room and Patient would have to be out 10 days out of isolation from COVID diagnosis.  Mary stated she attempted to contact Patient's daughter, with no answer.     PARTHA stated she would attempt to contact Patient's family again to inform them of semi-private room.   Mary stated she would keep SW updated also if she speaks with family.    14 24 SW spoke to patient's daughters, Nettie and Trista over the phone to discuss SNF placement. SW stated that Patient was accepted by [facility] in [place], however they just had a semi-private room available. Patient's daughters stated they were not agreeable to Semi-private room.   SW verbalized understanding and stated she would reach out to the other facilities to see if patient was accepted for care. Patient's daughters verbalized understanding. SW provided name and phone number for additional needs.     15 21 SW reached out to remaining pending SNF's for placement.  SW left message for [facility].  [facility] Yasmeen[facility], they could [facility] Patient - however they could not accept until Monday, due to Patient needing 10 day of isolation, from COVID diagnosis.  Per Joe, at [facility], they did not receive the referral but if SW refaxed, they would look at the referral. Per Joe, they only have 1 bed open and 5 pending referrals to review. SW verbalized understanding, verified fax number, and will send information to [facility].     15 31 Per Mayuri, with [facility], they are currently full and unable to accept Patient.     15 49 SW reached out to Patient's daughter Nettie, regarding SNF placement. Patient was accepted by [facility], however discharge could not happen until Monday. Patient's daughter stated she is going to discuss placement with patient's brother and other daughter, Trista, for placement at [facility].     SW will continue to follow and assist  as needed.

## 2025-01-07 NOTE — PT/OT/SLP PROGRESS
"Occupational Therapy   Treatment    Name: Shireen Felxi  MRN: [id number]  Admitting Diagnosis:  COVID-19       Recommendations:     Discharge Recommendations: Moderate Intensity Therapy  Discharge Equipment Recommendations:  to be determined by next level of care  Barriers to discharge:  Decreased caregiver support    Assessment:     Shireen Felix is a 79 y.o. female with a medical diagnosis of COVID-19.  She presents with the following performance deficits affecting function are weakness, impaired endurance, impaired self care skills, impaired functional mobility, gait instability, impaired balance, decreased upper extremity function, decreased lower extremity function, decreased safety awareness, pain, decreased ROM, impaired cardiopulmonary response to activity.     Rehab Prognosis:  Good; patient would benefit from acute skilled OT services to address these deficits and reach maximum level of function.       Plan:     Patient to be seen 2 x/week to address the above listed problems via self-care/home management, therapeutic activities, therapeutic exercises  Plan of Care Expires: 01/17/25  Plan of Care Reviewed with: patient    Subjective     Chief Complaint: pain, fatigue  Pt reports difficulty breathing and increased SOB over night.    "I really just don't feel like it. This covid has me beat."  "I'm so tired. I've been moving around today."  "The doctor told me I should stay in the bed today."    Patient/Family Comments/goals: get better  Pain/Comfort:  Pain Rating 1: 10/10  Location - Side 1: Bilateral  Location - Orientation 1: generalized  Location 1: abdomen (and "ALL OVER")  Pain Addressed 1: Distraction, Pre-medicate for activity  Pain Rating Post-Intervention 1: 10/10    Objective:     Communicated with: Nurse and epic chart review prior to session.  Patient found HOB elevated with peripheral IV, telemetry, pulse ox (continuous), PureWick, oxygen upon OT entry to room.    General Precautions: " Standard, airborne, contact, droplet, fall    Orthopedic Precautions:N/A  Braces: N/A  Respiratory Status: Nasal cannula, flow 4 L/min    Activities of Daily Living:  Feeding:  setup A. Pt found sitting up in bed eating lunch.    [facility] 6 Click ADL: 15    Treatment & Education:  Pt refused OT session at this time d/t pain and fatigue despite max encouragement. Patient educated on role of OT in acute setting and benefits of participation. Educated on techniques to use to increase independence and decrease fall risk with functional transfers. Educated on importance of OOB activity and calling for A to transfer and meet needs. Encouraged completion of B UE AROM therex throughout the day to tolerance to increase functional strength and activity tolerance. Educated patient on importance of increased tolerance to upright position and direct impact on CV endurance and strength. Patient encouraged to sit up in chair for a minimum of 2 consecutive hours per day. Patient stated understanding and in agreement with POC.     Patient left HOB elevated with all lines intact and call button in reach    GOALS:   Multidisciplinary Problems       Occupational Therapy Goals          Problem: Occupational Therapy    Goal Priority Disciplines Outcome Interventions   Occupational Therapy Goal     OT, PT/OT Progressing    Description: O.T GOALS MET BY 1-17-25  PT WILL TOLERATE  1 SET X 12 REPS B UE ROM EXERCISE  S WITH UE DRESSING  S WITH LE DRESSING  S WITH TOILET TRANSFERS                         Time Tracking:     OT Date of Treatment: 01/07/25  OT Start Time: 1215  OT Stop Time: 1230  OT Total Time (min): 15 min    Billable Minutes:Therapeutic Activity 15    OT/CORINA: OT     Number of CORINA visits since last OT visit: 0  Keesha Grimm OT     1/7/2025

## 2025-01-07 NOTE — ASSESSMENT & PLAN NOTE
Patient has Diastolic (HFpEF) heart failure that is Chronic. On presentation their CHF was well compensated. Most recent BNP and echo results are listed below.  Recent Labs     01/07/25  0918   *     Latest ECHO  Results for orders placed during the hospital encounter of 06/02/23    Echo    Interpretation Summary  · The left ventricle is normal in size with concentric hypertrophy and low normal systolic function.  · The estimated ejection fraction is 50%.  · Normal left ventricular diastolic function.  · There is abnormal septal wall motion consistent with left bundle branch block.  · Normal right ventricular size with normal right ventricular systolic function.  · Normal central venous pressure (3 mmHg).  · The estimated PA systolic pressure is 30 mmHg.  · Mild mitral regurgitation.    Current Heart Failure Medications  sacubitriL-valsartan 24-26 mg per tablet 1 tablet, 2 times daily, Oral  furosemide injection 40 mg, Daily, Intravenous    Plan  - Monitor strict I&Os and daily weights.    - Place on telemetry  - Low sodium diet  - Place on fluid restriction of 1.5 L.   - Cardiology has not been consulted  - The patient's volume status is at their baseline  - Repeat ECHO, EF improved

## 2025-01-07 NOTE — PROGRESS NOTES
[facility] Medicine  Progress Note    Patient Name: Shireen Felix  MRN: [id number]  Patient Class: IP- Inpatient   Admission Date: 12/31/2024  Length of Stay: 6 days  Attending Physician: Everardo Graf MD  Primary Care Provider: Laron Chaudhari MD        Subjective     Principal Problem:COVID-19        HPI:  Shireen Felix is a 79 year old female who has  has a past medical history of Abnormal ECG, Acute bronchitis and bronchiolitis, Aneurysm, Anticoagulant long-term use, Arthritis, CAD in native artery, COPD (chronic obstructive pulmonary disease), Diabetes mellitus, Fall, Glaucoma, Hypertension, Seizures, Shingles, and Stroke who presented to Emergency Department for evaluation for cough. Associated symptoms include: congestion, wheezing, and SOB. She was seen by her pulmonologist today, Dr. Sam. Started as sinus pain and drainage and now has progressed to respiratory symptoms. She was seen a few days ago in the ER given prescription for antibiotics steroids but these have not helped much. Today in the office she can barely talk without coughing continually. She is having a difficult time putting more than 3 words together without severe cough paroxysms. ED workup showed Hgb/Hct 11.9/37.0, K+ 3.1, CO2 16, BUN/creatinine 34/1.5. CT chest w/out contrast stable suspected pulmonary fibrosis of the lungs. No acute infiltrates. She has received DuoNeb x 1, solumedrol 125 mg IV x 1, KCL 40 mEq PO x 1 and NS 1L bolus. Pt admitted for COVID.     Overview/Hospital Course:  Admitted to Hospital Medicine for acute on chronic respiratory failure concerns in setting of COVID-19.  Started on IV methylprednisolone, remdesivir, scheduled respiratory treatments. 01/04: Reported left lower quadrant abdominal pain with tenderness to palpation, will evaluate via CT as patient is on therapeutic anticoagulation for COVID-19.  CT abdomen/pelvis nonacute. 01/05:  Reported improvements  in respiratory status with decreased frequency of cough and dyspnea but still reporting significant symptoms from baseline.  On 1/6, patient had a significant event with acute tachypnea, tachycardia. Pulmonology consulted, orders adjusted. Patient stable overnight, had another event on 1/7 similar to previous day. Possible exacerbation related to anxiety provoked by medications and congestion. Case reviewed, orders adjusted. Per Pulmonology, due to underlying lung fibrosis, patient is likely not benefiting from bronchodilators. Will d/c for now.     Interval History: Patient experienced acute anxiety following albuterol, due to underlying lung fibrosis, pulmonology felt risk outweighs benefit from bronchodilators. Will hold for now.     Review of Systems   Constitutional:  Positive for activity change, appetite change and fatigue.   HENT:  Positive for congestion.    Respiratory:  Positive for cough, chest tightness, shortness of breath, wheezing and stridor.    Cardiovascular:  Positive for chest pain.   Gastrointestinal:  Positive for abdominal distention.   Musculoskeletal:  Positive for arthralgias.   Neurological:  Positive for weakness and headaches.   All other systems reviewed and are negative.    Objective:     Vital Signs (Most Recent):  Temp: 97 °F (36.1 °C) (01/07/25 0730)  Pulse: 70 (01/07/25 1451)  Resp: (!) 28 (01/07/25 0909)  BP: (!) 140/64 (01/07/25 0730)  SpO2: 95 % (01/07/25 0909) Vital Signs (24h Range):  Temp:  [97 °F (36.1 °C)-98.6 °F (37 °C)] 97 °F (36.1 °C)  Pulse:  [51-82] 70  Resp:  [20-34] 28  SpO2:  [95 %-100 %] 95 %  BP: ()/(53-72) 140/64     Weight: 54 kg (119 lb)  Body mass index is 20.43 kg/m².    Intake/Output Summary (Last 24 hours) at 1/7/2025 1516  Last data filed at 1/7/2025 1414  Gross per 24 hour   Intake --   Output 2200 ml   Net -2200 ml         Physical Exam  Vitals and nursing note reviewed.   Constitutional:       General: She is not in acute distress.      Appearance: She is underweight. She is ill-appearing.   HENT:      Head: Normocephalic and atraumatic.      Right Ear: Hearing and external ear normal.      Left Ear: Hearing and external ear normal.      Nose: No rhinorrhea.      Right Sinus: No maxillary sinus tenderness or frontal sinus tenderness.      Left Sinus: No maxillary sinus tenderness or frontal sinus tenderness.      Mouth/Throat:      Mouth: No oral lesions.      Pharynx: Uvula midline.   Eyes:      General:         Right eye: No discharge.         Left eye: No discharge.      Conjunctiva/sclera: Conjunctivae normal.      Pupils: Pupils are equal, round, and reactive to light.   Neck:      Thyroid: No thyromegaly.      Vascular: No carotid bruit.      Trachea: No tracheal deviation.   Cardiovascular:      Rate and Rhythm: Normal rate and regular rhythm.      Pulses:           Dorsalis pedis pulses are 1+ on the right side and 1+ on the left side.      Heart sounds: Normal heart sounds, S1 normal and S2 normal. No murmur heard.  Pulmonary:      Effort: Tachypnea present. No respiratory distress.      Breath sounds: Examination of the right-upper field reveals rhonchi. Examination of the left-upper field reveals rhonchi. Examination of the right-lower field reveals decreased breath sounds. Examination of the left-lower field reveals decreased breath sounds. Decreased breath sounds and rhonchi present.   Abdominal:      General: Bowel sounds are normal. There is distension.      Palpations: Abdomen is soft. There is no mass.      Tenderness: There is abdominal tenderness in the suprapubic area and left lower quadrant.   Musculoskeletal:         General: Normal range of motion.      Cervical back: Normal range of motion.   Lymphadenopathy:      Cervical: No cervical adenopathy.      Upper Body:      Right upper body: No supraclavicular adenopathy.      Left upper body: No supraclavicular adenopathy.   Skin:     General: Skin is warm and dry.       Capillary Refill: Capillary refill takes less than 2 seconds.      Coloration: Skin is pale.      Findings: No rash.   Neurological:      Mental Status: She is oriented to person, place, and time. She is lethargic.   Psychiatric:         Mood and Affect: Mood is anxious. Mood is not depressed. Affect is labile and tearful.         Speech: Speech is rapid and pressured.         Behavior: Behavior is hyperactive.         Judgment: Judgment is impulsive.             Significant Labs: All pertinent labs within the past 24 hours have been reviewed.  CBC:   Recent Labs   Lab 01/06/25 0453 01/07/25  0544   WBC 12.55 15.81*   HGB 12.7 13.2   HCT 41.0 40.8    200     CMP:   Recent Labs   Lab 01/06/25 0453 01/07/25  0544    139   K 5.5* 4.3    105   CO2 20* 19*   GLU 96 85   BUN 32* 48*   CREATININE 0.8 1.3   CALCIUM 8.4* 8.8   PROT 5.7* 6.0   ALBUMIN 2.5* 2.7*   BILITOT 0.3 0.3   ALKPHOS 47 53   AST 27 30   ALT 22 25   ANIONGAP 10 15     Cardiac Markers:   Recent Labs   Lab 01/07/25  0918   *       Significant Imaging: I have reviewed all pertinent imaging results/findings within the past 24 hours.    Assessment and Plan     * COVID-19  Patient is identified as Severe COVID-19 based on hypoxemia with O2 saturations <94% on room air or on ambulation   Initiate standard COVID protocols; COVID-19 testing ,Infection Control notification  and isolation- respiratory, contact and droplet per protocol    Diagnostics: CBC, CMP, Ferritin, CRP, Troponin, and Portable CXR    Management: Initiate targeted therapy with Remdesivir, 200mg IV x1, followed by 100mg IV daily x5 days total and Anticoagulation, Patient admitted to non-critical care unit- Will initiate full dose anticoagulation with Weight based lovenox 1mg/kg IV q12, Maintain oxygen saturations 92-96% via Nasal Cannula  LPM and monitor with continuous/intermittent pulse oximetry. , Inhaled bronchodilators as needed for shortness of breath., and  Continuous cardiac monitoring.    Advance Care Planning Current advance care plan has been discussed with patient/family/POA and patient currently wishes Full Code.     Hypokalemia  Patient's most recent potassium results are listed below.   Recent Labs     01/05/25  0531 01/06/25  0453 01/07/25  0544   K 4.2 5.5* 4.3       Plan  - Replete potassium per protocol  - Monitor potassium Daily  - Patient's hypokalemia is  being treated. Received KCL 40 mEq PO x 1 in ED    CKD (chronic kidney disease)  Creatine stable for now. BMP reviewed- noted Estimated Creatinine Clearance: 29.4 mL/min (based on SCr of 1.3 mg/dL). according to latest data. Based on current GFR, CKD stage is stage 4 - GFR 15-29.  Monitor UOP and serial BMP and adjust therapy as needed. Renally dose meds. Avoid nephrotoxic medications and procedures.  -Creatinine 1.5 on admission however 3 months ago was 1.2    Recent Labs     01/05/25  0531 01/06/25 0453 01/07/25  0544   CREATININE 0.8 0.8 1.3         -Due to GFR, will need to dc ranexa for now and decrease dose of gabapentin  -Hold spironolactone   -Continue Entresto   -Monitor creatinine    Chronic systolic heart failure  Patient has Diastolic (HFpEF) heart failure that is Chronic. On presentation their CHF was well compensated. Most recent BNP and echo results are listed below.  Recent Labs     01/07/25  0918   *     Latest ECHO  Results for orders placed during the hospital encounter of 06/02/23    Echo    Interpretation Summary  · The left ventricle is normal in size with concentric hypertrophy and low normal systolic function.  · The estimated ejection fraction is 50%.  · Normal left ventricular diastolic function.  · There is abnormal septal wall motion consistent with left bundle branch block.  · Normal right ventricular size with normal right ventricular systolic function.  · Normal central venous pressure (3 mmHg).  · The estimated PA systolic pressure is 30 mmHg.  · Mild mitral  regurgitation.    Current Heart Failure Medications  sacubitriL-valsartan 24-26 mg per tablet 1 tablet, 2 times daily, Oral  furosemide injection 40 mg, Daily, Intravenous    Plan  - Monitor strict I&Os and daily weights.    - Place on telemetry  - Low sodium diet  - Place on fluid restriction of 1.5 L.   - Cardiology has not been consulted  - The patient's volume status is at their baseline  - Repeat ECHO, EF improved           Mixed hyperlipidemia  Lab Results   Component Value Date    CHOL 112 (L) 07/12/2024    HDL 41 07/12/2024    LDLCALC 49.8 (L) 07/12/2024    TRIG 106 07/12/2024     -Continue Statin    Normocytic anemia  Anemia is likely due to chronic disease due to Chronic Kidney Disease. Most recent hemoglobin and hematocrit are listed below.  Recent Labs     01/05/25  0531 01/06/25  0453 01/07/25  0544   HGB 12.3 12.7 13.2   HCT 40.6 41.0 40.8       Plan  - Monitor serial CBC: Daily  - Transfuse PRBC if patient becomes hemodynamically unstable, symptomatic or H/H drops below 7/21.  - Patient has not received any PRBC transfusions to date  - Patient's anemia is currently stable    Seizure  -Continue Keppra  -Seizure precautions      Coronary artery disease of native artery of native heart with stable angina pectoris  Patient with known CAD, which is controlled Will continue Plavix and Statin and monitor for S/Sx of angina/ACS. Continue to monitor on telemetry.     UIP (usual interstitial pneumonitis)  Pulm fibrosis hx- followed by pulmonology outpatient  -Will need close pulm f/u after discharge        GERD (gastroesophageal reflux disease)  -Continue PPI        VTE Risk Mitigation (From admission, onward)           Ordered     enoxaparin injection 50 mg  2 times daily         12/31/24 1518                    Discharge Planning   KATHERINE:      Code Status: Full Code   Medical Readiness for Discharge Date:   Discharge Plan A: Skilled Nursing Facility   Discharge Delays: None known at this time            Please  place Justification for DME        Emi Jenkins NP  Department of Hospital Medicine   [facility] - Telemetry ([facility])

## 2025-01-07 NOTE — SUBJECTIVE & OBJECTIVE
Interval History: Patient experienced acute anxiety following albuterol, due to underlying lung fibrosis, pulmonology felt risk outweighs benefit from bronchodilators. Will hold for now.     Review of Systems   Constitutional:  Positive for activity change, appetite change and fatigue.   HENT:  Positive for congestion.    Respiratory:  Positive for cough, chest tightness, shortness of breath, wheezing and stridor.    Cardiovascular:  Positive for chest pain.   Gastrointestinal:  Positive for abdominal distention.   Musculoskeletal:  Positive for arthralgias.   Neurological:  Positive for weakness and headaches.   All other systems reviewed and are negative.    Objective:     Vital Signs (Most Recent):  Temp: 97 °F (36.1 °C) (01/07/25 0730)  Pulse: 70 (01/07/25 1451)  Resp: (!) 28 (01/07/25 0909)  BP: (!) 140/64 (01/07/25 0730)  SpO2: 95 % (01/07/25 0909) Vital Signs (24h Range):  Temp:  [97 °F (36.1 °C)-98.6 °F (37 °C)] 97 °F (36.1 °C)  Pulse:  [51-82] 70  Resp:  [20-34] 28  SpO2:  [95 %-100 %] 95 %  BP: ()/(53-72) 140/64     Weight: 54 kg (119 lb)  Body mass index is 20.43 kg/m².    Intake/Output Summary (Last 24 hours) at 1/7/2025 1516  Last data filed at 1/7/2025 1414  Gross per 24 hour   Intake --   Output 2200 ml   Net -2200 ml         Physical Exam  Vitals and nursing note reviewed.   Constitutional:       General: She is not in acute distress.     Appearance: She is underweight. She is ill-appearing.   HENT:      Head: Normocephalic and atraumatic.      Right Ear: Hearing and external ear normal.      Left Ear: Hearing and external ear normal.      Nose: No rhinorrhea.      Right Sinus: No maxillary sinus tenderness or frontal sinus tenderness.      Left Sinus: No maxillary sinus tenderness or frontal sinus tenderness.      Mouth/Throat:      Mouth: No oral lesions.      Pharynx: Uvula midline.   Eyes:      General:         Right eye: No discharge.         Left eye: No discharge.       Conjunctiva/sclera: Conjunctivae normal.      Pupils: Pupils are equal, round, and reactive to light.   Neck:      Thyroid: No thyromegaly.      Vascular: No carotid bruit.      Trachea: No tracheal deviation.   Cardiovascular:      Rate and Rhythm: Normal rate and regular rhythm.      Pulses:           Dorsalis pedis pulses are 1+ on the right side and 1+ on the left side.      Heart sounds: Normal heart sounds, S1 normal and S2 normal. No murmur heard.  Pulmonary:      Effort: Tachypnea present. No respiratory distress.      Breath sounds: Examination of the right-upper field reveals rhonchi. Examination of the left-upper field reveals rhonchi. Examination of the right-lower field reveals decreased breath sounds. Examination of the left-lower field reveals decreased breath sounds. Decreased breath sounds and rhonchi present.   Abdominal:      General: Bowel sounds are normal. There is distension.      Palpations: Abdomen is soft. There is no mass.      Tenderness: There is abdominal tenderness in the suprapubic area and left lower quadrant.   Musculoskeletal:         General: Normal range of motion.      Cervical back: Normal range of motion.   Lymphadenopathy:      Cervical: No cervical adenopathy.      Upper Body:      Right upper body: No supraclavicular adenopathy.      Left upper body: No supraclavicular adenopathy.   Skin:     General: Skin is warm and dry.      Capillary Refill: Capillary refill takes less than 2 seconds.      Coloration: Skin is pale.      Findings: No rash.   Neurological:      Mental Status: She is oriented to person, place, and time. She is lethargic.   Psychiatric:         Mood and Affect: Mood is anxious. Mood is not depressed. Affect is labile and tearful.         Speech: Speech is rapid and pressured.         Behavior: Behavior is hyperactive.         Judgment: Judgment is impulsive.             Significant Labs: All pertinent labs within the past 24 hours have been reviewed.  CBC:    Recent Labs   Lab 01/06/25  0453 01/07/25  0544   WBC 12.55 15.81*   HGB 12.7 13.2   HCT 41.0 40.8    200     CMP:   Recent Labs   Lab 01/06/25  0453 01/07/25  0544    139   K 5.5* 4.3    105   CO2 20* 19*   GLU 96 85   BUN 32* 48*   CREATININE 0.8 1.3   CALCIUM 8.4* 8.8   PROT 5.7* 6.0   ALBUMIN 2.5* 2.7*   BILITOT 0.3 0.3   ALKPHOS 47 53   AST 27 30   ALT 22 25   ANIONGAP 10 15     Cardiac Markers:   Recent Labs   Lab 01/07/25  0918   *       Significant Imaging: I have reviewed all pertinent imaging results/findings within the past 24 hours.

## 2025-01-07 NOTE — SUBJECTIVE & OBJECTIVE
Past Medical History:   Diagnosis Date    Abnormal ECG 08/05/2019    Acute bronchitis and bronchiolitis 12/31/2024    Aneurysm     Anticoagulant long-term use     Arthritis     CAD in native artery 08/05/2019    Diabetes mellitus     Fall 10/10/2019    Formatting of this note might be different from the original. Found sitting on floor next to bed last night Mild confusion today Does not recall falling or how she ended up on floor UA today Labs yesterday unremarkable    Glaucoma     Hypertension     Pulmonary fibrosis     Seizures     Shingles 05/27/2017    Stroke        Past Surgical History:   Procedure Laterality Date    BRAIN SURGERY      CARDIAC CATHETERIZATION      CATHETERIZATION OF BOTH LEFT AND RIGHT HEART N/A 5/17/2024    Procedure: CATHETERIZATION, HEART, BOTH LEFT AND RIGHT;  Surgeon: Rachelle Sarabia MD;  Location: [facility] CATH LAB;  Service: Cardiology;  Laterality: N/A;    COLONOSCOPY N/A 09/21/2023    Procedure: COLONOSCOPY;  Surgeon: Joanna Leal MD;  Location: [facility] ENDO;  Service: Endoscopy;  Laterality: N/A;    CORONARY ANGIOPLASTY      EPIDURAL STEROID INJECTION INTO CERVICAL SPINE N/A 2/7/2024    Procedure: Cervical IL XANDER, Level C6/7, RN IV sedation;  Surgeon: Francisco Oshea MD;  Location: [facility] PAIN MGT;  Service: Pain Management;  Laterality: N/A;    EPIDURAL STEROID INJECTION INTO LUMBAR SPINE Bilateral 11/12/2019    Procedure: TF XANDER L4/5;  Surgeon: Desean Dias MD;  Location: [facility] PAIN MGT;  Service: Pain Management;  Laterality: Bilateral;    HYSTERECTOMY      INJECTION OF ANESTHETIC AGENT AROUND MEDIAL BRANCH NERVES INNERVATING LUMBAR FACET JOINT Bilateral 01/31/2020    Procedure: Bilateral L3-5 MBB;  Surgeon: Desean Dias MD;  Location: [facility] PAIN MGT;  Service: Pain Management;  Laterality: Bilateral;    INJECTION OF ANESTHETIC AGENT INTO SACROILIAC JOINT Right 11/16/2021    Procedure: Right BLOCK, SACROILIAC JOINT Right GTB with RN IV sedation;  Surgeon: Yao Fulton MD;   Location: [facility] PAIN MGT;  Service: Pain Management;  Laterality: Right;    INJECTION OF ANESTHETIC AGENT INTO SACROILIAC JOINT Bilateral 07/28/2023    Procedure: Bilateral GT bursa + bilateral SIJ injection;  Surgeon: Francisco Oshea MD;  Location: [facility] PAIN MGT;  Service: Pain Management;  Laterality: Bilateral;    INJECTION OF JOINT Bilateral 07/09/2020    Procedure: Bilateral shoulder GH Joint injection with local;  Surgeon: Desean Dias MD;  Location: [facility] PAIN MGT;  Service: Pain Management;  Laterality: Bilateral;    INJECTION OF JOINT Bilateral 07/28/2023    Procedure: Bilateral GT bursa + bilateral SIJ injection NEEDS ADDITIONAL SEDATION AND MORE TIME BEFORE INJECTION RN IV Sedation;  Surgeon: Francisco Oshea MD;  Location: [facility] PAIN MGT;  Service: Pain Management;  Laterality: Bilateral;    INJECTION OF JOINT N/A 10/25/2023    Procedure: Sacrococcygeal joint injection;  Surgeon: Francisco Oshea MD;  Location: [facility] PAIN MGT;  Service: Pain Management;  Laterality: N/A;    OOPHORECTOMY      SELECTIVE INJECTION OF ANESTHETIC AGENT AROUND LUMBAR SPINAL NERVE ROOT BY TRANSFORAMINAL APPROACH Bilateral 04/26/2023    Procedure: Bilateral L4/5 TF XANDER RN IV Sedation;  Surgeon: Francisco Oshea MD;  Location: [facility] PAIN MGT;  Service: Pain Management;  Laterality: Bilateral;    TRANSFORAMINAL EPIDURAL INJECTION OF STEROID Bilateral 07/23/2019    Procedure: Bilateral L3/4 Transforaminal Epidural Steroid Injection;  Surgeon: Desean Dias MD;  Location: [facility] PAIN MGT;  Service: Pain Management;  Laterality: Bilateral;    TRANSFORAMINAL EPIDURAL INJECTION OF STEROID Bilateral 03/10/2020    Procedure: Right T12/L1 TF XANDER with local;  Surgeon: Desean Dias MD;  Location: [facility] PAIN MGT;  Service: Pain Management;  Laterality: Bilateral;    TRANSFORAMINAL EPIDURAL INJECTION OF STEROID Right 06/19/2020    Procedure: Right T12/L1 TFESI Covid day of procedure;  Surgeon: Desean Dias MD;  Location: [facility] PAIN MGT;   Service: Pain Management;  Laterality: Right;       Review of patient's allergies indicates:   Allergen Reactions    Penicillins Anaphylaxis, Hives, Shortness Of Breath and Swelling    Penicillin     Adhesive Rash    Doxycycline Nausea Only    Oxycodone Other (See Comments)     Fatigue      Sulfa (sulfonamide antibiotics) Itching       Family History       Problem Relation (Age of Onset)    Breast cancer Maternal Aunt, Maternal Aunt, Maternal Aunt    No Known Problems Mother, Father          Tobacco Use    Smoking status: Former     Current packs/day: 0.00     Average packs/day: 1 pack/day for 42.3 years (42.3 ttl pk-yrs)     Types: Cigarettes     Start date:      Quit date: 2004     Years since quittin.7    Smokeless tobacco: Former   Substance and Sexual Activity    Alcohol use: No    Drug use: Never    Sexual activity: Not Currently     Partners: Male         Review of Systems   Constitutional:  Positive for activity change and fatigue.   Respiratory:  Positive for cough, chest tightness and shortness of breath. Negative for wheezing.    Cardiovascular:  Positive for chest pain. Negative for palpitations and leg swelling.        Pleuritic   Gastrointestinal:  Positive for abdominal pain. Negative for constipation, diarrhea, nausea and vomiting.        With coughing fits   Neurological:  Positive for weakness. Negative for dizziness, seizures and headaches.   Psychiatric/Behavioral:  The patient is nervous/anxious.      Objective:     Vital Signs (Most Recent):  Temp: 97 °F (36.1 °C) (25)  Pulse: 60 (25)  Resp: (!) 28 (25)  BP: (!) 140/64 (25)  SpO2: 95 % (25) Vital Signs (24h Range):  Temp:  [97 °F (36.1 °C)-98.6 °F (37 °C)] 97 °F (36.1 °C)  Pulse:  [51-82] 60  Resp:  [18-34] 28  SpO2:  [93 %-100 %] 95 %  BP: ()/(53-72) 140/64     Weight: 55.4 kg (122 lb 2.2 oz)  Body mass index is 20.96 kg/m².      Intake/Output Summary (Last 24 hours) at  1/7/2025 1031  Last data filed at 1/7/2025 0631  Gross per 24 hour   Intake --   Output 1200 ml   Net -1200 ml        Physical Exam  Constitutional:       General: She is in acute distress.      Appearance: She is ill-appearing.   HENT:      Head: Normocephalic and atraumatic.      Nose: Nose normal.      Mouth/Throat:      Mouth: Mucous membranes are dry.   Eyes:      Pupils: Pupils are equal, round, and reactive to light.   Cardiovascular:      Rate and Rhythm: Regular rhythm. Tachycardia present.      Pulses: Normal pulses.      Heart sounds: Normal heart sounds.   Pulmonary:      Effort: Pulmonary effort is normal. Tachypnea present.      Breath sounds: Rales present. No wheezing.      Comments: Dyspnea with coughing fits, comfortable at rest otherwise  Abdominal:      General: Bowel sounds are normal. There is no distension.      Palpations: Abdomen is soft.      Tenderness: There is no abdominal tenderness.   Musculoskeletal:         General: No swelling.   Skin:     General: Skin is warm and dry.   Neurological:      General: No focal deficit present.      Mental Status: She is alert and oriented to person, place, and time. Mental status is at baseline.      Motor: Weakness present.   Psychiatric:         Mood and Affect: Mood is anxious.         Speech: Speech is rapid and pressured.          Vents:  Oxygen Concentration (%): 36 (01/07/25 0715)    Lines/Drains/Airways       Drain  Duration             Female External Urinary Catheter w/ Suction 12/31/24 2230 6 days              Peripheral Intravenous Line  Duration                  Peripheral IV - Single Lumen 01/04/25 1317 22 G Left;Posterior;Proximal Forearm 2 days                    Significant Labs:    CBC/Anemia Profile:  Recent Labs   Lab 01/06/25  0453 01/06/25  1610 01/07/25  0544   WBC 12.55  --  15.81*   HGB 12.7  --  13.2   HCT 41.0  --  40.8     --  200   MCV 98  --  96   RDW 14.6*  --  14.8*   FERRITIN  --  146  --          Chemistries:  Recent Labs   Lab 01/06/25  0453 01/07/25  0544    139   K 5.5* 4.3    105   CO2 20* 19*   BUN 32* 48*   CREATININE 0.8 1.3   CALCIUM 8.4* 8.8   ALBUMIN 2.5* 2.7*   PROT 5.7* 6.0   BILITOT 0.3 0.3   ALKPHOS 47 53   ALT 22 25   AST 27 30   MG 2.0 1.8   PHOS 3.0 3.7       All pertinent labs within the past 24 hours have been reviewed.    Significant Imaging:   I have reviewed all pertinent imaging results/findings within the past 24 hours.

## 2025-01-07 NOTE — PT/OT/SLP PROGRESS
"Physical Therapy Treatment    Patient Name:  Shireen Felix   MRN:  [id number]    Recommendations:     Discharge Recommendations: Moderate Intensity Therapy  Discharge Equipment Recommendations: to be determined by next level of care  Barriers to discharge: None    Assessment:     Shireen Felix is a 79 y.o. female admitted with a medical diagnosis of COVID-19.  She presents with the following impairments/functional limitations: weakness, impaired endurance, impaired functional mobility, gait instability, impaired balance, pain, decreased safety awareness, decreased lower extremity function, decreased ROM, impaired cardiopulmonary response to activity.    Rehab Prognosis: Good; patient would benefit from acute skilled PT services to address these deficits and reach maximum level of function.    Recent Surgery: * No surgery found *      Plan:     During this hospitalization, patient to be seen 3 x/week to address the identified rehab impairments via gait training, therapeutic activities, therapeutic exercises and progress toward the following goals:    Plan of Care Expires:  01/17/25    Subjective     Chief Complaint: Pt reports difficulty breathing over night. Reported "I don't want to get up today, I'm so tired. I've been moving around."  Patient/Family Comments/goals: none stated  Pain/Comfort:  Pain Rating 1: 10/10  Location - Side 1: Bilateral  Location - Orientation 1: generalized  Location 1: abdomen (and "all over")  Pain Addressed 1: Pre-medicate for activity, Distraction  Pain Rating Post-Intervention 1: 10/10    Objective:     Communicated with nurse and epic chart review prior to session.  Patient found HOB elevated with peripheral IV, oxygen, PureWick, pulse ox (continuous), telemetry upon PT entry to room.     General Precautions: Standard, airborne, contact, droplet, fall  Orthopedic Precautions: N/A  Braces: N/A  Respiratory Status: Nasal cannula, flow 4 L/min     Functional Mobility:  Pt " "refused all mobility, refused repositioning in bed.     AM-PAC 6 CLICK MOBILITY  Turning over in bed (including adjusting bedclothes, sheets and blankets)?: 1 (REF)  Sitting down on and standing up from a chair with arms (e.g., wheelchair, bedside commode, etc.): 1 (REF)  Moving from lying on back to sitting on the side of the bed?: 1 (REF)  Moving to and from a bed to a chair (including a wheelchair)?: 1 (REF)  Need to walk in hospital room?: 1 (REF)  Climbing 3-5 steps with a railing?: 1 (REF)  Basic Mobility Total Score: 6       Treatment & Education:  Reviewed role of PT in acute care and POC. Pt refused all OOB/EOB activity and supine TherEx, despite max encouragement from therapist, due to fatigue and pain. Educated on the importance of OOB/EOB activity for her recovery. Educated on importance of consistent participation with PT. Educated on importance of TherEx to maintain/regain strength, encouraged to complete supine TherEx (hip flex, hip abd/add, heel slides, quad sets, ankle pumps) throughout the day. Encouraged frequent position changes to reduce the risk of pressure injury. Encouraged to sit up in the chair for all meals. Reviewed "call don't fall" policy and increased risk of falling due to weakness, instructed to utilize call bell for assistance with all transfers. Pt agreeable to all requests.    Patient left HOB elevated with all lines intact, call button in reach, and bed alarm on..    GOALS:   Multidisciplinary Problems       Physical Therapy Goals          Problem: Physical Therapy    Goal Priority Disciplines Outcome Interventions   Physical Therapy Goal     PT, PT/OT Progressing    Description: Goals to be met by 1/17/25.  1. Pt will complete bed mobility SBA.  2. Pt will complete sit to stand SBA.  3. Pt will ambulate 100ft SBA using RW.  4. Pt will increase AMPAC score by 2 points to progress functional mobility.                       Time Tracking:     PT Received On: 01/07/25  PT Start " Time: 1215     PT Stop Time: 1223  PT Total Time (min): 8 min     Billable Minutes: Therapeutic Activity 8min    Treatment Type: Treatment  PT/PTA: PT     Number of PTA visits since last PT visit: 0     01/07/2025

## 2025-01-07 NOTE — PROGRESS NOTES
[facility]  Progress Note    Patient Name: Shireen Felix  MRN: [id number]  Patient Class: IP- Inpatient   Admission Date: 12/31/2024  Length of Stay: 6 days  Attending Physician: Everardo Graf MD  Primary Care Provider: Laron Chaudhari MD        Subjective     Principal Problem:COVID-19        HPI:  Shireen Felix is a 79 year old female who has  has a past medical history of Abnormal ECG, Acute bronchitis and bronchiolitis, Aneurysm, Anticoagulant long-term use, Arthritis, CAD in native artery, COPD (chronic obstructive pulmonary disease), Diabetes mellitus, Fall, Glaucoma, Hypertension, Seizures, Shingles, and Stroke who presented to Emergency Department for evaluation for cough. Associated symptoms include: congestion, wheezing, and SOB. She was seen by her pulmonologist today, Dr. Sam. Started as sinus pain and drainage and now has progressed to respiratory symptoms. She was seen a few days ago in the ER given prescription for antibiotics steroids but these have not helped much. Today in the office she can barely talk without coughing continually. She is having a difficult time putting more than 3 words together without severe cough paroxysms. ED workup showed Hgb/Hct 11.9/37.0, K+ 3.1, CO2 16, BUN/creatinine 34/1.5. CT chest w/out contrast stable suspected pulmonary fibrosis of the lungs. No acute infiltrates. She has received DuoNeb x 1, solumedrol 125 mg IV x 1, KCL 40 mEq PO x 1 and NS 1L bolus. Pt admitted for COVID.     Overview/Hospital Course:  Admitted to Hospital Medicine for acute on chronic respiratory failure concerns in setting of COVID-19.  Started on IV methylprednisolone, remdesivir, scheduled respiratory treatments. 01/04: Reported left lower quadrant abdominal pain with tenderness to palpation, will evaluate via CT as patient is on therapeutic anticoagulation for COVID-19.  CT abdomen/pelvis nonacute. 01/05:  Reported improvements  "in respiratory status with decreased frequency of cough and dyspnea but still reporting significant symptoms from baseline.  Will trial weaning methylprednisolone dosing.    01/06/2025  Solumedrol transitioned to decadron. Will give one dose of IV Lasix in the AM and reassess. If no improvement, will reach out to Pulmonology to assess.     Interval history:  See hospital course    Objective:   BP (!) 140/64 (BP Location: Right arm, Patient Position: Lying)   Pulse 62   Temp 97 °F (36.1 °C) (Oral)   Resp (!) 28   Ht 5' 4" (1.626 m)   Wt 54 kg (119 lb)   LMP  (LMP Unknown)   SpO2 95%   BMI 20.43 kg/m²     Intake/Output Summary (Last 24 hours) at 1/7/2025 1204  Last data filed at 1/7/2025 0631  Gross per 24 hour   Intake --   Output 1200 ml   Net -1200 ml       PHYSICAL EXAM  Vitals reviewed  Constitutional:       General: She is not in acute distress.     Appearance: She is ill-appearing. She is not toxic-appearing or diaphoretic.      Interventions: Nasal cannula in place.   HENT:      Head: Normocephalic and atraumatic.      Mouth/Throat:      Mouth: Mucous membranes are moist.   Eyes:      General: No scleral icterus.        Right eye: No discharge.         Left eye: No discharge.   Cardiovascular:      Rate and Rhythm: Normal rate and regular rhythm.      Heart sounds: Normal heart sounds.   Pulmonary:      Effort: Pulmonary effort is normal. No respiratory distress.      Breath sounds: Decreased breath sounds, wheezing and rhonchi present.   Abdominal:      General: Bowel sounds are normal. There is no distension.      Palpations: Abdomen is soft.      Tenderness: There is abdominal tenderness in the left lower quadrant. There is no guarding or rebound.   Musculoskeletal:      Cervical back: No rigidity.      Right lower leg: No edema.      Left lower leg: No edema.   Skin:     General: Skin is warm and dry.      Coloration: Skin is not jaundiced.   Neurological:      Mental Status: She is alert and " "oriented to person, place, and time. Mental status is at baseline.   Psychiatric:         Mood and Affect: Mood normal.         Behavior: Behavior normal.     LABS  All labs from the past 24 hours were reviewed.     BMP:   Recent Labs   Lab 01/07/25  0544   GLU 85      K 4.3      CO2 19*   BUN 48*   CREATININE 1.3   CALCIUM 8.8   MG 1.8     CBC:   Recent Labs   Lab 01/06/25  0453 01/07/25  0544   WBC 12.55 15.81*   HGB 12.7 13.2   HCT 41.0 40.8    200     CMP:   Recent Labs   Lab 01/06/25  0453 01/07/25  0544    139   K 5.5* 4.3    105   CO2 20* 19*   GLU 96 85   BUN 32* 48*   CREATININE 0.8 1.3   CALCIUM 8.4* 8.8   PROT 5.7* 6.0   ALBUMIN 2.5* 2.7*   BILITOT 0.3 0.3   ALKPHOS 47 53   AST 27 30   ALT 22 25   ANIONGAP 10 15     Cardiac Markers:   Recent Labs   Lab 01/07/25  0918   *     Coagulation: No results for input(s): "PT", "INR", "APTT" in the last 48 hours.  Lactic Acid: No results for input(s): "LACTATE" in the last 48 hours.  Magnesium:   Recent Labs   Lab 01/06/25 0453 01/07/25  0544   MG 2.0 1.8     Troponin:   Recent Labs   Lab 01/07/25  0918   TROPONINI 0.008     TSH:   No results for input(s): "TSH" in the last 4320 hours.  Urine Studies:   No results for input(s): "COLORU", "APPEARANCEUA", "PHUR", "SPECGRAV", "PROTEINUA", "GLUCUA", "KETONESU", "BILIRUBINUA", "OCCULTUA", "NITRITE", "UROBILINOGEN", "LEUKOCYTESUR", "RBCUA", "WBCUA", "BACTERIA", "SQUAMEPITHEL", "HYALINECASTS" in the last 48 hours.    Invalid input(s): "WRIGHTSUR"    IMAGING  All imaging from the past 24 hours were reviewed.     Imaging Results              CT Chest Without Contrast (Final result)  Result time 12/31/24 13:59:22      Final result by Tommy Maravilla MD (Timothy) (12/31/24 13:59:22)                   Impression:      Stable suspected pulmonary fibrosis of the lungs.  No acute infiltrates.      Electronically signed by: Tommy Maravilla MD  Date:    12/31/2024  Time:    13:59          "      Narrative:    EXAMINATION:  CT CHEST WITHOUT CONTRAST    CLINICAL HISTORY:  Cough, persistent;    TECHNIQUE:  Standard noncontrast CT of the chest.  All CT scans at this facility use dose modulation, iterative reconstruction and/or weight based dosing when appropriate to reduce radiation dose to as low as reasonably achievable.    COMPARISON:  CT scan chest, 11/21/2024    FINDINGS:  Heart is normal in size.  Coronary artery calcifications are present.  Atherosclerosis of thoracic aorta.  No evidence of mediastinal hilar adenopathy.  No pericardial effusions.    Stable interstitial lung changes at the lung bases likely related to honeycombing and fibrosis.  No consolidation.  No acute infiltrates.  Airways appear unremarkable.    No adenopathy is identified.    Stable chronic compression deformities of the thoracic spine.                                          Assessment and Plan     * COVID-19  Patient is identified as Severe COVID-19 based on hypoxemia with O2 saturations <94% on room air or on ambulation   Initiate standard COVID protocols; COVID-19 testing ,Infection Control notification  and isolation- respiratory, contact and droplet per protocol    Diagnostics: CBC, CMP, Ferritin, CRP, Troponin, and Portable CXR    Management: Initiate targeted therapy with Remdesivir, 200mg IV x1, followed by 100mg IV daily x5 days total and Anticoagulation, Patient admitted to non-critical care unit- Will initiate full dose anticoagulation with Weight based lovenox 1mg/kg IV q12, Maintain oxygen saturations 92-96% via Nasal Cannula  LPM and monitor with continuous/intermittent pulse oximetry. , Inhaled bronchodilators as needed for shortness of breath., and Continuous cardiac monitoring.    Advance Care Planning Current advance care plan has been discussed with patient/family/POA and patient currently wishes Full Code.     Hypokalemia  Patient's most recent potassium results are listed below.   Recent Labs      "01/03/25  0510 01/04/25  0440 01/05/25  0531   K 4.2 4.5 4.2       Plan  - Replete potassium per protocol  - Monitor potassium Daily  - Patient's hypokalemia is  being treated. Received KCL 40 mEq PO x 1 in ED    CKD (chronic kidney disease)  Creatine stable for now. BMP reviewed- noted Estimated Creatinine Clearance: 48.4 mL/min (based on SCr of 0.8 mg/dL). according to latest data. Based on current GFR, CKD stage is stage 4 - GFR 15-29.  Monitor UOP and serial BMP and adjust therapy as needed. Renally dose meds. Avoid nephrotoxic medications and procedures.  -Creatinine 1.5 on admission however 3 months ago was 1.2    Recent Labs     01/03/25  0510 01/04/25  0440 01/05/25  0531   CREATININE 0.9 0.9 0.8         -Due to GFR, will need to dc ranexa for now and decrease dose of gabapentin  -Hold spironolactone   -Continue Entresto   -Monitor creatinine    Chronic systolic heart failure  Patient has Diastolic (HFpEF) heart failure that is Chronic. On presentation their CHF was well compensated. Most recent BNP and echo results are listed below.  No results for input(s): "BNP" in the last 72 hours.    Latest ECHO  Results for orders placed during the hospital encounter of 06/02/23    Echo    Interpretation Summary  · The left ventricle is normal in size with concentric hypertrophy and low normal systolic function.  · The estimated ejection fraction is 50%.  · Normal left ventricular diastolic function.  · There is abnormal septal wall motion consistent with left bundle branch block.  · Normal right ventricular size with normal right ventricular systolic function.  · Normal central venous pressure (3 mmHg).  · The estimated PA systolic pressure is 30 mmHg.  · Mild mitral regurgitation.    Current Heart Failure Medications  sacubitriL-valsartan 24-26 mg per tablet 1 tablet, 2 times daily, Oral    Plan  - Monitor strict I&Os and daily weights.    - Place on telemetry  - Low sodium diet  - Place on fluid restriction of 1.5 " L.   - Cardiology has not been consulted  - The patient's volume status is at their baseline           Mixed hyperlipidemia  Lab Results   Component Value Date    CHOL 112 (L) 07/12/2024    HDL 41 07/12/2024    LDLCALC 49.8 (L) 07/12/2024    TRIG 106 07/12/2024     -Continue Statin    Normocytic anemia  Anemia is likely due to chronic disease due to Chronic Kidney Disease. Most recent hemoglobin and hematocrit are listed below.  Recent Labs     01/03/25  0510 01/04/25  0440 01/05/25  0531   HGB 12.0 11.6* 12.3   HCT 39.0 38.1 40.6       Plan  - Monitor serial CBC: Daily  - Transfuse PRBC if patient becomes hemodynamically unstable, symptomatic or H/H drops below 7/21.  - Patient has not received any PRBC transfusions to date  - Patient's anemia is currently stable    Seizure  -Continue Keppra  -Seizure precautions      Coronary artery disease of native artery of native heart with stable angina pectoris  Patient with known CAD, which is controlled Will continue Plavix and Statin and monitor for S/Sx of angina/ACS. Continue to monitor on telemetry.     GERD (gastroesophageal reflux disease)  -Continue PPI        VTE Risk Mitigation (From admission, onward)           Ordered     enoxaparin injection 50 mg  2 times daily         12/31/24 1518                    Discharge Planning   KATHERINE:      Code Status: Full Code   Medical Readiness for Discharge Date:   Discharge Plan A: Skilled Nursing Facility   Discharge Delays: None known at this time        Everardo Graf MD  Department of Hospital Medicine   [facility] - Telemetry ([facility])

## 2025-01-07 NOTE — NURSING
"Patient /53 notified night hospitalist and okay'd to hold entresto, metoprolol, and isosorbide for tonight. Patient saturation at 100% on no rebreather and was transferred to venti mask and continues to saturate at 100 patient continues to desaturate to mid to upper 80"s upon exertion.  "

## 2025-01-07 NOTE — ASSESSMENT & PLAN NOTE
Anemia is likely due to chronic disease due to Chronic Kidney Disease. Most recent hemoglobin and hematocrit are listed below.  Recent Labs     01/05/25  0531 01/06/25  0453 01/07/25  0544   HGB 12.3 12.7 13.2   HCT 40.6 41.0 40.8       Plan  - Monitor serial CBC: Daily  - Transfuse PRBC if patient becomes hemodynamically unstable, symptomatic or H/H drops below 7/21.  - Patient has not received any PRBC transfusions to date  - Patient's anemia is currently stable

## 2025-01-07 NOTE — PLAN OF CARE
Pt refused all OOB/EOB activity and supine TherEx, despite max encouragement from therapist, due to pain and fatigue. Educated on the importance of OOB/EOB activity for her recovery. Educated on importance of consistent participation with PT. Educated on importance of TherEx to maintain/regain strength, encouraged to complete supine TherEx (hip flex, hip abd/add, heel slides, quad sets, ankle pumps) throughout the day. Encouraged frequent position changes to reduce the risk of pressure injury. Encouraged to sit up in the chair for all meals. Recommending moderate intensity therapy upon d/c.

## 2025-01-07 NOTE — ASSESSMENT & PLAN NOTE
Patient's most recent potassium results are listed below.   Recent Labs     01/05/25  0531 01/06/25  0453 01/07/25  0544   K 4.2 5.5* 4.3       Plan  - Replete potassium per protocol  - Monitor potassium Daily  - Patient's hypokalemia is  being treated. Received KCL 40 mEq PO x 1 in ED

## 2025-01-07 NOTE — SIGNIFICANT EVENT
79-year-old female, under the care of [facility] for management of COVID infection.  Comorbid conditions include bronchiolitis, CAD, COPD, dm, HTN, history of CVA.    Notified by primary nurse patient complaining of SOB, numbness tingling right fingers.  Patient with significant respiratory distress, productive cough with thick yellow/green sputum.  Patient placed on non-rebreather per RT.     Case discussed with pulmonology, recommended duo nebs, furosemide, CXR, ABG.    New orders:  --ABG  --morphine 2 mg IV x1  --DuoNeb  --furosemide 40 mg x 1  --CXR    Low threshold for transfer to ICU if no improvement or clinical worsening.  Reviewed ABG at bedside, results to be uploaded to system.  ABG results: 7.5/26.7/469/20.8/100%

## 2025-01-08 LAB
ALBUMIN SERPL BCP-MCNC: 2.7 G/DL (ref 3.5–5.2)
ALP SERPL-CCNC: 52 U/L (ref 40–150)
ALT SERPL W/O P-5'-P-CCNC: 24 U/L (ref 10–44)
ANION GAP SERPL CALC-SCNC: 11 MMOL/L (ref 8–16)
AST SERPL-CCNC: 20 U/L (ref 10–40)
BASOPHILS # BLD AUTO: 0.05 K/UL (ref 0–0.2)
BASOPHILS NFR BLD: 0.4 % (ref 0–1.9)
BILIRUB SERPL-MCNC: 0.4 MG/DL (ref 0.1–1)
BUN SERPL-MCNC: 39 MG/DL (ref 8–23)
CALCIUM SERPL-MCNC: 9 MG/DL (ref 8.7–10.5)
CHLORIDE SERPL-SCNC: 105 MMOL/L (ref 95–110)
CO2 SERPL-SCNC: 21 MMOL/L (ref 23–29)
CREAT SERPL-MCNC: 1.1 MG/DL (ref 0.5–1.4)
DIFFERENTIAL METHOD BLD: ABNORMAL
EOSINOPHIL # BLD AUTO: 0.1 K/UL (ref 0–0.5)
EOSINOPHIL NFR BLD: 0.9 % (ref 0–8)
ERYTHROCYTE [DISTWIDTH] IN BLOOD BY AUTOMATED COUNT: 15.2 % (ref 11.5–14.5)
EST. GFR  (NO RACE VARIABLE): 51 ML/MIN/1.73 M^2
GLUCOSE SERPL-MCNC: 90 MG/DL (ref 70–110)
HCT VFR BLD AUTO: 42.3 % (ref 37–48.5)
HGB BLD-MCNC: 12.9 G/DL (ref 12–16)
IMM GRANULOCYTES # BLD AUTO: 0.29 K/UL (ref 0–0.04)
IMM GRANULOCYTES NFR BLD AUTO: 2.2 % (ref 0–0.5)
LYMPHOCYTES # BLD AUTO: 2.5 K/UL (ref 1–4.8)
LYMPHOCYTES NFR BLD: 18.8 % (ref 18–48)
MAGNESIUM SERPL-MCNC: 1.7 MG/DL (ref 1.6–2.6)
MCH RBC QN AUTO: 31.3 PG (ref 27–31)
MCHC RBC AUTO-ENTMCNC: 30.5 G/DL (ref 32–36)
MCV RBC AUTO: 103 FL (ref 82–98)
MONOCYTES # BLD AUTO: 0.5 K/UL (ref 0.3–1)
MONOCYTES NFR BLD: 3.5 % (ref 4–15)
NEUTROPHILS # BLD AUTO: 10 K/UL (ref 1.8–7.7)
NEUTROPHILS NFR BLD: 74.2 % (ref 38–73)
NRBC BLD-RTO: 0 /100 WBC
PHOSPHATE SERPL-MCNC: 3.1 MG/DL (ref 2.7–4.5)
PLATELET # BLD AUTO: 180 K/UL (ref 150–450)
PMV BLD AUTO: 10 FL (ref 9.2–12.9)
POTASSIUM SERPL-SCNC: 4.3 MMOL/L (ref 3.5–5.1)
PROT SERPL-MCNC: 5.7 G/DL (ref 6–8.4)
RBC # BLD AUTO: 4.12 M/UL (ref 4–5.4)
SARS-COV-2 RDRP RESP QL NAA+PROBE: POSITIVE
SODIUM SERPL-SCNC: 137 MMOL/L (ref 136–145)
WBC # BLD AUTO: 13.42 K/UL (ref 3.9–12.7)

## 2025-01-08 PROCEDURE — 80053 COMPREHEN METABOLIC PANEL: CPT | Performed by: STUDENT IN AN ORGANIZED HEALTH CARE EDUCATION/TRAINING PROGRAM

## 2025-01-08 PROCEDURE — 85025 COMPLETE CBC W/AUTO DIFF WBC: CPT | Performed by: STUDENT IN AN ORGANIZED HEALTH CARE EDUCATION/TRAINING PROGRAM

## 2025-01-08 PROCEDURE — 27000207 HC ISOLATION

## 2025-01-08 PROCEDURE — 84100 ASSAY OF PHOSPHORUS: CPT | Performed by: STUDENT IN AN ORGANIZED HEALTH CARE EDUCATION/TRAINING PROGRAM

## 2025-01-08 PROCEDURE — 87635 SARS-COV-2 COVID-19 AMP PRB: CPT | Performed by: STUDENT IN AN ORGANIZED HEALTH CARE EDUCATION/TRAINING PROGRAM

## 2025-01-08 PROCEDURE — 97530 THERAPEUTIC ACTIVITIES: CPT

## 2025-01-08 PROCEDURE — 63600175 PHARM REV CODE 636 W HCPCS: Performed by: NURSE PRACTITIONER

## 2025-01-08 PROCEDURE — 83735 ASSAY OF MAGNESIUM: CPT | Performed by: STUDENT IN AN ORGANIZED HEALTH CARE EDUCATION/TRAINING PROGRAM

## 2025-01-08 PROCEDURE — 21400001 HC TELEMETRY ROOM

## 2025-01-08 PROCEDURE — 25000003 PHARM REV CODE 250: Performed by: NURSE PRACTITIONER

## 2025-01-08 PROCEDURE — 94761 N-INVAS EAR/PLS OXIMETRY MLT: CPT

## 2025-01-08 PROCEDURE — 25000003 PHARM REV CODE 250: Performed by: STUDENT IN AN ORGANIZED HEALTH CARE EDUCATION/TRAINING PROGRAM

## 2025-01-08 PROCEDURE — 99900035 HC TECH TIME PER 15 MIN (STAT)

## 2025-01-08 PROCEDURE — 36415 COLL VENOUS BLD VENIPUNCTURE: CPT | Performed by: STUDENT IN AN ORGANIZED HEALTH CARE EDUCATION/TRAINING PROGRAM

## 2025-01-08 PROCEDURE — 27000221 HC OXYGEN, UP TO 24 HOURS

## 2025-01-08 RX ORDER — FUROSEMIDE 20 MG/1
20 TABLET ORAL DAILY
Status: DISCONTINUED | OUTPATIENT
Start: 2025-01-08 | End: 2025-01-14

## 2025-01-08 RX ADMIN — GUAIFENESIN AND DEXTROMETHORPHAN HYDROBROMIDE 1 TABLET: 600; 30 TABLET, EXTENDED RELEASE ORAL at 08:01

## 2025-01-08 RX ADMIN — BENZONATATE 100 MG: 100 CAPSULE ORAL at 08:01

## 2025-01-08 RX ADMIN — CLOPIDOGREL BISULFATE 75 MG: 75 TABLET ORAL at 08:01

## 2025-01-08 RX ADMIN — SACUBITRIL AND VALSARTAN 1 TABLET: 24; 26 TABLET, FILM COATED ORAL at 08:01

## 2025-01-08 RX ADMIN — MONTELUKAST 10 MG: 10 TABLET, FILM COATED ORAL at 08:01

## 2025-01-08 RX ADMIN — METOPROLOL TARTRATE 50 MG: 50 TABLET, FILM COATED ORAL at 08:01

## 2025-01-08 RX ADMIN — MUPIROCIN: 20 OINTMENT TOPICAL at 09:01

## 2025-01-08 RX ADMIN — LEVETIRACETAM 500 MG: 500 TABLET, FILM COATED ORAL at 08:01

## 2025-01-08 RX ADMIN — ENOXAPARIN SODIUM 50 MG: 60 INJECTION SUBCUTANEOUS at 08:01

## 2025-01-08 RX ADMIN — OXYCODONE HYDROCHLORIDE AND ACETAMINOPHEN 500 MG: 500 TABLET ORAL at 08:01

## 2025-01-08 RX ADMIN — PANTOPRAZOLE SODIUM 40 MG: 40 TABLET, DELAYED RELEASE ORAL at 08:01

## 2025-01-08 RX ADMIN — GABAPENTIN 200 MG: 100 CAPSULE ORAL at 08:01

## 2025-01-08 RX ADMIN — MUPIROCIN: 20 OINTMENT TOPICAL at 08:01

## 2025-01-08 RX ADMIN — DEXAMETHASONE SODIUM PHOSPHATE 6 MG: 4 INJECTION INTRA-ARTICULAR; INTRALESIONAL; INTRAMUSCULAR; INTRAVENOUS; SOFT TISSUE at 08:01

## 2025-01-08 RX ADMIN — ATORVASTATIN CALCIUM 40 MG: 40 TABLET, FILM COATED ORAL at 08:01

## 2025-01-08 RX ADMIN — ESCITALOPRAM OXALATE 20 MG: 10 TABLET, FILM COATED ORAL at 08:01

## 2025-01-08 RX ADMIN — MORPHINE SULFATE 2 MG: 2 INJECTION, SOLUTION INTRAMUSCULAR; INTRAVENOUS at 10:01

## 2025-01-08 RX ADMIN — THERA TABS 1 TABLET: TAB at 08:01

## 2025-01-08 RX ADMIN — FLUTICASONE PROPIONATE 100 MCG: 50 SPRAY, METERED NASAL at 08:01

## 2025-01-08 RX ADMIN — BENZONATATE 100 MG: 100 CAPSULE ORAL at 02:01

## 2025-01-08 RX ADMIN — GABAPENTIN 200 MG: 100 CAPSULE ORAL at 02:01

## 2025-01-08 RX ADMIN — FUROSEMIDE 20 MG: 20 TABLET ORAL at 10:01

## 2025-01-08 RX ADMIN — ALPRAZOLAM 0.25 MG: 0.25 TABLET ORAL at 10:01

## 2025-01-08 NOTE — SUBJECTIVE & OBJECTIVE
Interval History:  Negative for acute events overnight.  Discontinued IV Lasix, resumed home dose.    Review of Systems   Constitutional:  Positive for activity change, appetite change and fatigue.   HENT:  Positive for congestion.    Respiratory:  Positive for cough (productive), chest tightness, shortness of breath, wheezing and stridor.    Cardiovascular:  Negative for chest pain.   Gastrointestinal:  Positive for abdominal distention and abdominal pain.   Musculoskeletal:  Positive for arthralgias.   Neurological:  Positive for weakness and headaches.   All other systems reviewed and are negative.    Objective:     Vital Signs (Most Recent):  Temp: 98.7 °F (37.1 °C) (01/08/25 1612)  Pulse: 79 (01/08/25 1612)  Resp: (!) 21 (01/08/25 1612)  BP: (!) 85/50 (01/08/25 1612)  SpO2: 98 % (01/08/25 1612) Vital Signs (24h Range):  Temp:  [97.4 °F (36.3 °C)-98.7 °F (37.1 °C)] 98.7 °F (37.1 °C)  Pulse:  [20-83] 79  Resp:  [18-26] 21  SpO2:  [96 %-100 %] 98 %  BP: ()/(50-86) 85/50     Weight: 51.1 kg (112 lb 10.5 oz)  Body mass index is 19.34 kg/m².    Intake/Output Summary (Last 24 hours) at 1/8/2025 1702  Last data filed at 1/8/2025 0900  Gross per 24 hour   Intake 300 ml   Output --   Net 300 ml         Physical Exam  Vitals and nursing note reviewed.   Constitutional:       General: She is not in acute distress.     Appearance: She is underweight. She is ill-appearing.   HENT:      Head: Normocephalic and atraumatic.      Right Ear: Hearing and external ear normal.      Left Ear: Hearing and external ear normal.      Nose: No rhinorrhea.      Right Sinus: No maxillary sinus tenderness or frontal sinus tenderness.      Left Sinus: No maxillary sinus tenderness or frontal sinus tenderness.      Mouth/Throat:      Mouth: No oral lesions.      Pharynx: Uvula midline.   Eyes:      General:         Right eye: No discharge.         Left eye: No discharge.      Conjunctiva/sclera: Conjunctivae normal.      Pupils: Pupils  are equal, round, and reactive to light.   Neck:      Thyroid: No thyromegaly.      Vascular: No carotid bruit.      Trachea: No tracheal deviation.   Cardiovascular:      Rate and Rhythm: Normal rate and regular rhythm.      Pulses:           Dorsalis pedis pulses are 1+ on the right side and 1+ on the left side.      Heart sounds: Normal heart sounds, S1 normal and S2 normal. No murmur heard.  Pulmonary:      Effort: Tachypnea present. No respiratory distress.      Breath sounds: Examination of the right-upper field reveals rhonchi. Examination of the left-upper field reveals rhonchi. Examination of the right-lower field reveals decreased breath sounds. Examination of the left-lower field reveals decreased breath sounds. Decreased breath sounds and rhonchi present.   Abdominal:      General: Bowel sounds are normal. There is distension.      Palpations: Abdomen is soft. There is no mass.      Tenderness: There is abdominal tenderness in the suprapubic area and left lower quadrant.   Musculoskeletal:         General: Normal range of motion.      Cervical back: Normal range of motion.   Lymphadenopathy:      Cervical: No cervical adenopathy.      Upper Body:      Right upper body: No supraclavicular adenopathy.      Left upper body: No supraclavicular adenopathy.   Skin:     General: Skin is warm and dry.      Capillary Refill: Capillary refill takes less than 2 seconds.      Coloration: Skin is pale.      Findings: No rash.   Neurological:      Mental Status: She is oriented to person, place, and time. She is lethargic.   Psychiatric:         Mood and Affect: Mood is anxious. Mood is not depressed. Affect is labile and tearful.         Speech: Speech is rapid and pressured.         Behavior: Behavior is hyperactive.         Judgment: Judgment is impulsive.             Significant Labs: All pertinent labs within the past 24 hours have been reviewed.  CBC:   Recent Labs   Lab 01/07/25  0544 01/08/25  0604   WBC  15.81* 13.42*   HGB 13.2 12.9   HCT 40.8 42.3    180     CMP:   Recent Labs   Lab 01/07/25  0544 01/08/25  0604    137   K 4.3 4.3    105   CO2 19* 21*   GLU 85 90   BUN 48* 39*   CREATININE 1.3 1.1   CALCIUM 8.8 9.0   PROT 6.0 5.7*   ALBUMIN 2.7* 2.7*   BILITOT 0.3 0.4   ALKPHOS 53 52   AST 30 20   ALT 25 24   ANIONGAP 15 11       Significant Imaging: I have reviewed all pertinent imaging results/findings within the past 24 hours.

## 2025-01-08 NOTE — PT/OT/SLP PROGRESS
Occupational Therapy   Treatment    Name: Shireen Felix  MRN: [id number]  Admitting Diagnosis:  COVID-19       Recommendations:     Discharge Recommendations: Moderate Intensity Therapy  Discharge Equipment Recommendations:  to be determined by next level of care  Barriers to discharge:  Decreased caregiver support    Assessment:     Shireen Felix is a 79 y.o. female with a medical diagnosis of COVID-19.  She presents with the following performance deficits affecting function are weakness, impaired endurance, impaired self care skills, impaired functional mobility, gait instability, impaired balance, decreased upper extremity function, decreased lower extremity function, decreased safety awareness, pain, decreased ROM, impaired cardiopulmonary response to activity.     Rehab Prognosis:  Good; patient would benefit from acute skilled OT services to address these deficits and reach maximum level of function.       Plan:     Patient to be seen 2 x/week to address the above listed problems via self-care/home management, therapeutic activities, therapeutic exercises  Plan of Care Expires: 01/17/25  Plan of Care Reviewed with: patient    Subjective     Chief Complaint: persistent cough  Patient/Family Comments/goals: To maximize independence with ADLs.   Pain/Comfort:  Pain Rating 1: 0/10    Objective:     Communicated with: nurse prior to session.  Patient found HOB elevated with peripheral IV, oxygen, bed alarm, telemetry upon OT entry to room.    General Precautions: Standard, airborne, contact, droplet, fall    Orthopedic Precautions:N/A  Braces: N/A  Respiratory Status: Nasal cannula, flow 4 L/min     Occupational Performance:     Bed Mobility:    Patient completed Scooting/Bridging with contact guard assistance  Patient completed Supine to Sit with contact guard assistance     Functional Mobility/Transfers:  Patient completed Sit <> Stand Transfer with maximal assistance  with  hand-held assist   Patient  completed Bed <> Chair Transfer using Stand Pivot technique with maximal assistance with handheld assistance  Functional Mobility: Pt unable to progress with steps due to poor standing balance and endurance.     Activities of Daily Living:  Feeding:  independence to feed herself soup      [facility] 6 Click ADL: 16    Treatment & Education:  Patient sat EOB x 5 minutes to increase endurance needed for bathing. Two sit to stand trials completed to increase strength needed for dressing.     Patient left up in chair with all lines intact, call button in reach, and chair alarm on    GOALS:   Multidisciplinary Problems       Occupational Therapy Goals          Problem: Occupational Therapy    Goal Priority Disciplines Outcome Interventions   Occupational Therapy Goal     OT, PT/OT Progressing    Description: O.T GOALS MET BY 1-17-25  PT WILL TOLERATE  1 SET X 12 REPS B UE ROM EXERCISE  S WITH UE DRESSING  S WITH LE DRESSING  S WITH TOILET TRANSFERS                         Time Tracking:     OT Date of Treatment: 01/08/25  OT Start Time: 1145  OT Stop Time: 1210  OT Total Time (min): 25 min    Billable Minutes:Therapeutic Activity 25    OT/CORINA: OT     Number of CORINA visits since last OT visit: 0    1/8/2025

## 2025-01-08 NOTE — ASSESSMENT & PLAN NOTE
Anemia is likely due to chronic disease due to Chronic Kidney Disease. Most recent hemoglobin and hematocrit are listed below.  Recent Labs     01/06/25  0453 01/07/25  0544 01/08/25  0604   HGB 12.7 13.2 12.9   HCT 41.0 40.8 42.3     Plan  - Monitor serial CBC: Daily  - Transfuse PRBC if patient becomes hemodynamically unstable, symptomatic or H/H drops below 7/21.  - Patient has not received any PRBC transfusions to date  - Patient's anemia is currently stable

## 2025-01-08 NOTE — PT/OT/SLP PROGRESS
Physical Therapy Treatment    Patient Name:  Shireen Felix   MRN:  [id number]    Recommendations:     Discharge Recommendations: Moderate Intensity Therapy  Discharge Equipment Recommendations: to be determined by next level of care  Barriers to discharge: Decreased caregiver support    Assessment:     Shireen Felix is a 79 y.o. female admitted with a medical diagnosis of COVID-19.  She presents with the following impairments/functional limitations: weakness, impaired endurance, impaired functional mobility, gait instability, impaired balance, pain, decreased safety awareness, decreased lower extremity function, impaired cardiopulmonary response to activity, decreased coordination, decreased ROM.    Rehab Prognosis: Good; patient would benefit from acute skilled PT services to address these deficits and reach maximum level of function.    Recent Surgery: * No surgery found *      Plan:     During this hospitalization, patient to be seen 3 x/week to address the identified rehab impairments via gait training, therapeutic activities, therapeutic exercises and progress toward the following goals:    Plan of Care Expires:  01/17/25    Subjective     Chief Complaint: Pt agreed to participate  Patient/Family Comments/goals: none stated  Pain/Comfort:  Pain Rating 1: 6/10  Location - Side 1: Bilateral  Location - Orientation 1: generalized  Location 1: abdomen  Pain Addressed 1: Reposition, Distraction  Pain Rating Post-Intervention 1: 6/10  Pain Rating 2: 6/10  Location - Side 2: Bilateral  Location - Orientation 2: generalized  Location 2: back  Pain Addressed 2: Reposition, Distraction  Pain Rating Post-Intervention 2: 6/10      Objective:     Communicated with nurse and epic chart review prior to session.  Patient found HOB elevated with peripheral IV, pulse ox (continuous), PureWick, oxygen upon PT entry to room.     General Precautions: Standard, airborne, contact, droplet, fall  Orthopedic Precautions:  "N/A  Braces: N/A  Respiratory Status: Nasal cannula, flow 3 L/min     Functional Mobility:  Gait Belt Applied - Yes   Socks/Shoes Donned - Yes  Bed Mobility  Rolling Right: contact guard assistance  Scooting: contact guard assistance  Supine to Sit: contact guard assistance  Transfers  Sit to Stand: moderate assistance with hand-held assist, x2 reps  Bed to Chair: maximal assistance with hand-held assist using Stand Pivot  Gait  Patient able to pivot to transfer to chair with hand-held assist and maximal assistance. Patient demonstrates unsteady gait, B knee buckling, difficulty advancing B LE to take steps, quick to fatigue. No c/o dizziness, mild SOB, educated about pursed lip breathing technique and cued for use with mobility. All lines remained intact throughout ambulation trial, portable Supplemental O2 3L utilized.  Balance  Sitting: contact guard assistance  Standing: moderate assistance to maximal assistance  Increased time and verbal cuing needed    AM-PAC 6 CLICK MOBILITY  Turning over in bed (including adjusting bedclothes, sheets and blankets)?: 3  Sitting down on and standing up from a chair with arms (e.g., wheelchair, bedside commode, etc.): 2  Moving from lying on back to sitting on the side of the bed?: 3  Moving to and from a bed to a chair (including a wheelchair)?: 2  Need to walk in hospital room?: 2  Climbing 3-5 steps with a railing?: 1 (NT)  Basic Mobility Total Score: 13       Treatment & Education:  Reviewed role of PT in acute care and POC. Pt tolerated interventions well. Reviewed importance of OOB activities, activity pacing, and HEP (marching/hip flex, hip abd, heel slides/LAQ, quad sets, ankle pumps) in order to maintain/regain strength. Encouraged to sit up in chair for all meals. Reviewed "call don't fall" policy and increased risk of falling due to weakness, instructed to utilize call bell for assistance with all transfers. Pt agreeable to all requests.    Patient left up in " chair with all lines intact, call button in reach, and chair alarm on..    GOALS:   Multidisciplinary Problems       Physical Therapy Goals          Problem: Physical Therapy    Goal Priority Disciplines Outcome Interventions   Physical Therapy Goal     PT, PT/OT Progressing    Description: Goals to be met by 1/17/25.  1. Pt will complete bed mobility SBA.  2. Pt will complete sit to stand SBA.  3. Pt will ambulate 100ft SBA using RW.  4. Pt will increase AMPAC score by 2 points to progress functional mobility.                       Time Tracking:     PT Received On: 01/08/25  PT Start Time: 1135     PT Stop Time: 1200  PT Total Time (min): 25 min     Billable Minutes: Therapeutic Activity 25min    Treatment Type: Treatment  PT/PTA: PT     Number of PTA visits since last PT visit: 0     01/08/2025

## 2025-01-08 NOTE — RESPIRATORY THERAPY
Home Oxygen Evaluation - [facility] - Cardiopulmonary Department      Date Performed: 1/8/2025      1) Patient's Home O2 Sat on room air, while at rest: Room Air SpO2 At Rest: 99 %        If O2 sats on room air at rest are 88% or below, patient qualifies.  Document O2 liter flow needed in Step 2.  If O2 sats are 89% or above, complete Step 3.        2)  If patient is not ambulated and O2 sats are <88%, what is the O2 liter flow required to meet ordered saturation? Home O2 Eval Comments: pt qualifies for home oxygen    If O2 sats on room air while exercising remain 89% or above patient does not qualify, no further testing needed Document N/A in step 3. If O2 sats on room air while exercising are 88% or below, continue to Step 4.    3) Patient's O2 Sat on room air while exercising: Room Air SpO2 During Ambulation: (!) 78 %        4) Patient's O2 Sat while exercising on O2: SpO2 During Ambulation on O2: 96 % at Ambulation O2 LPM: 4 LPM         (Must show improvement from #4 for patients to qualify)

## 2025-01-08 NOTE — ASSESSMENT & PLAN NOTE
Patient's most recent potassium results are listed below.   Recent Labs     01/06/25  0453 01/07/25  0544 01/08/25  0604   K 5.5* 4.3 4.3       Plan  - Replete potassium per protocol  - Monitor potassium Daily  - Patient's hypokalemia is  being treated. Received KCL 40 mEq PO x 1 in ED  -improved

## 2025-01-08 NOTE — SUBJECTIVE & OBJECTIVE
Objective:     Vital Signs (Most Recent):  Temp: 98 °F (36.7 °C) (01/08/25 1211)  Pulse: 72 (01/08/25 1300)  Resp: 20 (01/08/25 1211)  BP: (!) 117/57 (01/08/25 1211)  SpO2: 100 % (01/08/25 1211) Vital Signs (24h Range):  Temp:  [97.4 °F (36.3 °C)-98.1 °F (36.7 °C)] 98 °F (36.7 °C)  Pulse:  [20-81] 72  Resp:  [18-26] 20  SpO2:  [75 %-100 %] 100 %  BP: ()/(57-86) 117/57     Weight: 51.1 kg (112 lb 10.5 oz)  Body mass index is 19.34 kg/m².      Intake/Output Summary (Last 24 hours) at 1/8/2025 1423  Last data filed at 1/8/2025 0900  Gross per 24 hour   Intake 300 ml   Output --   Net 300 ml        Physical Exam  Constitutional:       General: She is not in acute distress.     Appearance: She is ill-appearing.   HENT:      Head: Normocephalic and atraumatic.      Nose: Nose normal.      Mouth/Throat:      Mouth: Mucous membranes are moist.   Eyes:      Pupils: Pupils are equal, round, and reactive to light.   Cardiovascular:      Rate and Rhythm: Normal rate and regular rhythm.      Pulses: Normal pulses.      Heart sounds: Normal heart sounds.   Pulmonary:      Effort: Pulmonary effort is normal.      Breath sounds: Normal breath sounds.      Comments: Comfortable at rest, tachypnea/shallow breathing with talking and exertion, as well as coughing fits  Abdominal:      General: Bowel sounds are normal.      Palpations: Abdomen is soft.   Musculoskeletal:         General: No swelling.      Cervical back: Normal range of motion and neck supple.   Skin:     General: Skin is warm and dry.   Neurological:      General: No focal deficit present.      Mental Status: She is oriented to person, place, and time. Mental status is at baseline.      Cranial Nerves: No cranial nerve deficit.      Motor: No weakness.           Review of Systems   HENT:  Positive for congestion.    Respiratory:  Positive for cough and shortness of breath. Negative for wheezing and stridor.    Cardiovascular:  Negative for chest pain, palpitations  and leg swelling.       Vents:  Oxygen Concentration (%): 36 (01/08/25 0211)    Lines/Drains/Airways       Drain  Duration             Female External Urinary Catheter w/ Suction 12/31/24 2230 7 days              Peripheral Intravenous Line  Duration                  Peripheral IV - Single Lumen 01/04/25 1317 22 G Left;Posterior;Proximal Forearm 4 days                    Significant Labs:    CBC/Anemia Profile:  Recent Labs   Lab 01/06/25  1610 01/07/25  0544 01/08/25  0604   WBC  --  15.81* 13.42*   HGB  --  13.2 12.9   HCT  --  40.8 42.3   PLT  --  200 180   MCV  --  96 103*   RDW  --  14.8* 15.2*   FERRITIN 146  --   --         Chemistries:  Recent Labs   Lab 01/07/25  0544 01/08/25  0604    137   K 4.3 4.3    105   CO2 19* 21*   BUN 48* 39*   CREATININE 1.3 1.1   CALCIUM 8.8 9.0   ALBUMIN 2.7* 2.7*   PROT 6.0 5.7*   BILITOT 0.3 0.4   ALKPHOS 53 52   ALT 25 24   AST 30 20   MG 1.8 1.7   PHOS 3.7 3.1       All pertinent labs within the past 24 hours have been reviewed.    Significant Imaging:  I have reviewed all pertinent imaging results/findings within the past 24 hours.

## 2025-01-08 NOTE — PLAN OF CARE
Patient requires Max A to transfer to bedside chair. Poor endurance and safety awareness with mobility. Recommending Moderate Intensity Therapy.

## 2025-01-08 NOTE — ASSESSMENT & PLAN NOTE
Patient is identified as High risk for severe complications of COVID 19 based on COVID risk score of 10   Initiate standard COVID protocols; COVID-19 testing ,Infection Control notification  and isolation- respiratory, contact and droplet per protocol    -Continue steroids x 10 days total, stop date 1/10  -Given hx of pulm fibrosis, likely won't respond to bronchodilators- scheduled nebs stopped, changed to prn  -Guaifenesin changed to Guaifenesin DM to see if that will help her coughing fits  -New Echo pending- does not seem volume overloaded on exam, consider stopping Lasix depending on BNP and repeat Echo  -CTA Chest pending  -Xanax and Morphine prn tachypnea and anxiety, educated patient about slow breathing/pursed lip breathing  -ABG has not shown hypoxia, hyperventilation with low CO2  -Discussed case with hospital medicine    1/8: Patient reports feeling much better with xanax and morphine on board  Discussed possible hospice vs. Palliative care at home, patient is interested. Says she is tired of being in and out of the hospital. Social work consulted, discussed with primary team.

## 2025-01-08 NOTE — PLAN OF CARE
A223/A223 OLU Felix is a 79 y.o.female admitted on 12/31/2024 for COVID-19   Code Status: Full Code MRN: [id number]   Review of patient's allergies indicates:   Allergen Reactions    Penicillins Anaphylaxis, Hives, Shortness Of Breath and Swelling    Penicillin     Adhesive Rash    Doxycycline Nausea Only    Oxycodone Other (See Comments)     Fatigue      Sulfa (sulfonamide antibiotics) Itching     Past Medical History:   Diagnosis Date    Abnormal ECG 08/05/2019    Acute bronchitis and bronchiolitis 12/31/2024    Aneurysm     Anticoagulant long-term use     Arthritis     CAD in native artery 08/05/2019    Diabetes mellitus     Fall 10/10/2019    Formatting of this note might be different from the original. Found sitting on floor next to bed last night Mild confusion today Does not recall falling or how she ended up on floor UA today Labs yesterday unremarkable    Glaucoma     Hypertension     Pulmonary fibrosis     Seizures     Shingles 05/27/2017    Stroke       PRN meds    acetaminophen, 650 mg, Q6H PRN  albuterol-ipratropium, 3 mL, Q4H PRN  ALPRAZolam, 0.25 mg, TID PRN  dextrose 50%, 12.5 g, PRN  dextrose 50%, 25 g, PRN  glucagon (human recombinant), 1 mg, PRN  glucose, 16 g, PRN  glucose, 24 g, PRN  HYDROcodone-acetaminophen, 1 tablet, Q6H PRN  melatonin, 6 mg, Nightly PRN  methocarbamoL, 500 mg, TID PRN  morphine, 2 mg, Q4H PRN  ondansetron, 4 mg, Q8H PRN  sodium chloride 0.9%, 10 mL, PRN      Chart check completed. Will continue plan of care.         Nettie Coma Scale Score: 15     Lead Monitored: Lead II Rhythm: normal sinus rhythm Frequency/Ectopy: PVCs  Cardiac/Telemetry Box Number: [id number]  VTE Core Measure: Pharmacological prophylaxis initiated/maintained Last Bowel Movement: 01/07/25  Diet Cardiac Standard Tray  Voiding Characteristics: external catheter  Gilberto Score: 13  Fall Risk Score: 19  Accucheck []   Freq?      Lines/Drains/Airways       Drain  Duration             Female External  Urinary Catheter w/ Suction 12/31/24 2230 7 days              Peripheral Intravenous Line  Duration                  Peripheral IV - Single Lumen 01/04/25 1317 22 G Left;Posterior;Proximal Forearm 4 days

## 2025-01-08 NOTE — PLAN OF CARE
Pt tolerated interventions well. Required CGA for bed mobility, MAX A for transfer with HHA. Recommending moderate intensity therapy upon d/c.

## 2025-01-08 NOTE — PROGRESS NOTES
[facility] Medicine  Progress Note    Patient Name: Shireen Felix  MRN: [id number]  Patient Class: IP- Inpatient   Admission Date: 12/31/2024  Length of Stay: 7 days  Attending Physician: Everardo Graf MD  Primary Care Provider: Laron Chaudhari MD        Subjective     Principal Problem:COVID-19        HPI:  Shireen Felix is a 79 year old female who has  has a past medical history of Abnormal ECG, Acute bronchitis and bronchiolitis, Aneurysm, Anticoagulant long-term use, Arthritis, CAD in native artery, COPD (chronic obstructive pulmonary disease), Diabetes mellitus, Fall, Glaucoma, Hypertension, Seizures, Shingles, and Stroke who presented to Emergency Department for evaluation for cough. Associated symptoms include: congestion, wheezing, and SOB. She was seen by her pulmonologist today, Dr. Sam. Started as sinus pain and drainage and now has progressed to respiratory symptoms. She was seen a few days ago in the ER given prescription for antibiotics steroids but these have not helped much. Today in the office she can barely talk without coughing continually. She is having a difficult time putting more than 3 words together without severe cough paroxysms. ED workup showed Hgb/Hct 11.9/37.0, K+ 3.1, CO2 16, BUN/creatinine 34/1.5. CT chest w/out contrast stable suspected pulmonary fibrosis of the lungs. No acute infiltrates. She has received DuoNeb x 1, solumedrol 125 mg IV x 1, KCL 40 mEq PO x 1 and NS 1L bolus. Pt admitted for COVID.     Overview/Hospital Course:  Admitted to Hospital Medicine for acute on chronic respiratory failure concerns in setting of COVID-19.  Started on IV methylprednisolone, remdesivir, scheduled respiratory treatments. 01/04: Reported left lower quadrant abdominal pain with tenderness to palpation, will evaluate via CT as patient is on therapeutic anticoagulation for COVID-19.  CT abdomen/pelvis nonacute. 01/05:  Reported improvements  in respiratory status with decreased frequency of cough and dyspnea but still reporting significant symptoms from baseline.  On 1/6, patient had a significant event with acute tachypnea, tachycardia. Pulmonology consulted, orders adjusted. Patient stable overnight, had another event on 1/7 similar to previous day. Possible exacerbation related to anxiety provoked by medications and congestion. Case reviewed, orders adjusted. Per Pulmonology, due to underlying lung fibrosis, patient is likely not benefiting from bronchodilators. Stopped on 1/7, patient did not experience any further episodes of anxiety, respiratory distress. Productive cough today, O2 weaned to 2.5L. Difficulty with discharge plan, patient originally planned to d/c to SNF, now prefers to d/c home. Discussing with family and SW on arrangements for assistance at home.     Interval History:  Negative for acute events overnight.  Discontinued IV Lasix, resumed home dose.    Review of Systems   Constitutional:  Positive for activity change, appetite change and fatigue.   HENT:  Positive for congestion.    Respiratory:  Positive for cough (productive), chest tightness, shortness of breath, wheezing and stridor.    Cardiovascular:  Negative for chest pain.   Gastrointestinal:  Positive for abdominal distention and abdominal pain.   Musculoskeletal:  Positive for arthralgias.   Neurological:  Positive for weakness and headaches.   All other systems reviewed and are negative.    Objective:     Vital Signs (Most Recent):  Temp: 98.7 °F (37.1 °C) (01/08/25 1612)  Pulse: 79 (01/08/25 1612)  Resp: (!) 21 (01/08/25 1612)  BP: (!) 85/50 (01/08/25 1612)  SpO2: 98 % (01/08/25 1612) Vital Signs (24h Range):  Temp:  [97.4 °F (36.3 °C)-98.7 °F (37.1 °C)] 98.7 °F (37.1 °C)  Pulse:  [20-83] 79  Resp:  [18-26] 21  SpO2:  [96 %-100 %] 98 %  BP: ()/(50-86) 85/50     Weight: 51.1 kg (112 lb 10.5 oz)  Body mass index is 19.34 kg/m².    Intake/Output Summary (Last 24  hours) at 1/8/2025 1702  Last data filed at 1/8/2025 0900  Gross per 24 hour   Intake 300 ml   Output --   Net 300 ml         Physical Exam  Vitals and nursing note reviewed.   Constitutional:       General: She is not in acute distress.     Appearance: She is underweight. She is ill-appearing.   HENT:      Head: Normocephalic and atraumatic.      Right Ear: Hearing and external ear normal.      Left Ear: Hearing and external ear normal.      Nose: No rhinorrhea.      Right Sinus: No maxillary sinus tenderness or frontal sinus tenderness.      Left Sinus: No maxillary sinus tenderness or frontal sinus tenderness.      Mouth/Throat:      Mouth: No oral lesions.      Pharynx: Uvula midline.   Eyes:      General:         Right eye: No discharge.         Left eye: No discharge.      Conjunctiva/sclera: Conjunctivae normal.      Pupils: Pupils are equal, round, and reactive to light.   Neck:      Thyroid: No thyromegaly.      Vascular: No carotid bruit.      Trachea: No tracheal deviation.   Cardiovascular:      Rate and Rhythm: Normal rate and regular rhythm.      Pulses:           Dorsalis pedis pulses are 1+ on the right side and 1+ on the left side.      Heart sounds: Normal heart sounds, S1 normal and S2 normal. No murmur heard.  Pulmonary:      Effort: Tachypnea present. No respiratory distress.      Breath sounds: Examination of the right-upper field reveals rhonchi. Examination of the left-upper field reveals rhonchi. Examination of the right-lower field reveals decreased breath sounds. Examination of the left-lower field reveals decreased breath sounds. Decreased breath sounds and rhonchi present.   Abdominal:      General: Bowel sounds are normal. There is distension.      Palpations: Abdomen is soft. There is no mass.      Tenderness: There is abdominal tenderness in the suprapubic area and left lower quadrant.   Musculoskeletal:         General: Normal range of motion.      Cervical back: Normal range of  motion.   Lymphadenopathy:      Cervical: No cervical adenopathy.      Upper Body:      Right upper body: No supraclavicular adenopathy.      Left upper body: No supraclavicular adenopathy.   Skin:     General: Skin is warm and dry.      Capillary Refill: Capillary refill takes less than 2 seconds.      Coloration: Skin is pale.      Findings: No rash.   Neurological:      Mental Status: She is oriented to person, place, and time. She is lethargic.   Psychiatric:         Mood and Affect: Mood is anxious. Mood is not depressed. Affect is labile and tearful.         Speech: Speech is rapid and pressured.         Behavior: Behavior is hyperactive.         Judgment: Judgment is impulsive.             Significant Labs: All pertinent labs within the past 24 hours have been reviewed.  CBC:   Recent Labs   Lab 01/07/25  0544 01/08/25  0604   WBC 15.81* 13.42*   HGB 13.2 12.9   HCT 40.8 42.3    180     CMP:   Recent Labs   Lab 01/07/25  0544 01/08/25  0604    137   K 4.3 4.3    105   CO2 19* 21*   GLU 85 90   BUN 48* 39*   CREATININE 1.3 1.1   CALCIUM 8.8 9.0   PROT 6.0 5.7*   ALBUMIN 2.7* 2.7*   BILITOT 0.3 0.4   ALKPHOS 53 52   AST 30 20   ALT 25 24   ANIONGAP 15 11       Significant Imaging: I have reviewed all pertinent imaging results/findings within the past 24 hours.    Assessment and Plan     * COVID-19  Patient is identified as Severe COVID-19 based on hypoxemia with O2 saturations <94% on room air or on ambulation   Initiate standard COVID protocols; COVID-19 testing ,Infection Control notification  and isolation- respiratory, contact and droplet per protocol    Diagnostics: CBC, CMP, Ferritin, CRP, Troponin, and Portable CXR    Management: Initiate targeted therapy with Remdesivir, 200mg IV x1, followed by 100mg IV daily x5 days total and Anticoagulation, Patient admitted to non-critical care unit- Will initiate full dose anticoagulation with Weight based lovenox 1mg/kg IV q12, Maintain oxygen  saturations 92-96% via Nasal Cannula  LPM and monitor with continuous/intermittent pulse oximetry. , Inhaled bronchodilators as needed for shortness of breath., and Continuous cardiac monitoring.    Advance Care Planning Current advance care plan has been discussed with patient/family/POA and patient currently wishes Full Code.     Hypokalemia  Patient's most recent potassium results are listed below.   Recent Labs     01/06/25  0453 01/07/25  0544 01/08/25  0604   K 5.5* 4.3 4.3       Plan  - Replete potassium per protocol  - Monitor potassium Daily  - Patient's hypokalemia is  being treated. Received KCL 40 mEq PO x 1 in ED  -improved    CKD (chronic kidney disease)  Creatine stable for now. BMP reviewed- noted Estimated Creatinine Clearance: 32.9 mL/min (based on SCr of 1.1 mg/dL). according to latest data. Based on current GFR, CKD stage is stage 4 - GFR 15-29.  Monitor UOP and serial BMP and adjust therapy as needed. Renally dose meds. Avoid nephrotoxic medications and procedures.  -Creatinine 1.5 on admission however 3 months ago was 1.2    Recent Labs     01/06/25  0453 01/07/25  0544 01/08/25  0604   CREATININE 0.8 1.3 1.1         -Due to GFR, will need to dc ranexa for now and decrease dose of gabapentin  -Hold spironolactone   -Continue Entresto   -Monitor creatinine  -improving    Chronic systolic heart failure  Patient has Diastolic (HFpEF) heart failure that is Chronic. On presentation their CHF was well compensated. Most recent BNP and echo results are listed below.  Recent Labs     01/07/25  0918   *       Latest ECHO  Results for orders placed during the hospital encounter of 06/02/23    Echo    Interpretation Summary  · The left ventricle is normal in size with concentric hypertrophy and low normal systolic function.  · The estimated ejection fraction is 50%.  · Normal left ventricular diastolic function.  · There is abnormal septal wall motion consistent with left bundle branch block.  ·  Normal right ventricular size with normal right ventricular systolic function.  · Normal central venous pressure (3 mmHg).  · The estimated PA systolic pressure is 30 mmHg.  · Mild mitral regurgitation.    Current Heart Failure Medications  sacubitriL-valsartan 24-26 mg per tablet 1 tablet, 2 times daily, Oral  furosemide tablet 20 mg, Daily, Oral    Plan  - Monitor strict I&Os and daily weights.    - Place on telemetry  - Low sodium diet  - Place on fluid restriction of 1.5 L.   - Cardiology has not been consulted  - The patient's volume status is at their baseline  - Repeat ECHO, EF improved  -stop IV Lasix, resume home dose p.o. Lasix           Mixed hyperlipidemia  Lab Results   Component Value Date    CHOL 112 (L) 07/12/2024    HDL 41 07/12/2024    LDLCALC 49.8 (L) 07/12/2024    TRIG 106 07/12/2024     -Continue Statin    Normocytic anemia  Anemia is likely due to chronic disease due to Chronic Kidney Disease. Most recent hemoglobin and hematocrit are listed below.  Recent Labs     01/06/25  0453 01/07/25  0544 01/08/25  0604   HGB 12.7 13.2 12.9   HCT 41.0 40.8 42.3     Plan  - Monitor serial CBC: Daily  - Transfuse PRBC if patient becomes hemodynamically unstable, symptomatic or H/H drops below 7/21.  - Patient has not received any PRBC transfusions to date  - Patient's anemia is currently stable    Seizure  -Continue Keppra  -Seizure precautions      Coronary artery disease of native artery of native heart with stable angina pectoris  Patient with known CAD, which is controlled Will continue Plavix and Statin and monitor for S/Sx of angina/ACS. Continue to monitor on telemetry.     UIP (usual interstitial pneumonitis)  Pulm fibrosis hx- followed by pulmonology outpatient  -Will need close pulm f/u after discharge        GERD (gastroesophageal reflux disease)  -Continue PPI        VTE Risk Mitigation (From admission, onward)           Ordered     enoxaparin injection 50 mg  2 times daily         12/31/24 1667                     Discharge Planning   KATHERINE:      Code Status: Full Code   Medical Readiness for Discharge Date:   Discharge Plan A: Skilled Nursing Facility   Discharge Delays: None known at this time            Please place Justification for DME        Emi Jenkins NP  Department of Hospital Medicine   [facility] ([facility])

## 2025-01-08 NOTE — HOSPITAL COURSE
01/08/2025: Patient looks much more comfortable today. Reports that xanax and morphine are helping her dyspnea.     1/18/25 - Reconsulted today due to c/f hemoptysis overnight.  CTA chest is fairly unremarkable.  Seems more likely related to epistaxis.  Placed on Zabrina-mask to keep the cannula out of her nose.  Afrin.  Will trial some TXA nebs for good measure, but I don't feel this is likely from a pulmonary source.  Codeine cough syrup to help with her persistent cough.    1/19/25 - She denies hemoptysis overnight. ENT eval today with cautery done to septal performation.  Remains on Zabrina-mask blowby and feels her breathing is stable.  Cough improving, though still persistent.

## 2025-01-08 NOTE — PROGRESS NOTES
[facility] ([facility])  Pulmonology  Progress Note    Patient Name: Shireen Felix  MRN: [id number]  Admission Date: 12/31/2024  Hospital Length of Stay: 7 days  Code Status: Full Code  Attending Provider: Everardo Graf MD  Primary Care Provider: Laron Chaudhari MD   Principal Problem: COVID-19    Subjective:     01/08/2025: Patient looks much more comfortable today. Reports that xanax and morphine are helping her dyspnea.     Objective:     Vital Signs (Most Recent):  Temp: 98 °F (36.7 °C) (01/08/25 1211)  Pulse: 72 (01/08/25 1300)  Resp: 20 (01/08/25 1211)  BP: (!) 117/57 (01/08/25 1211)  SpO2: 100 % (01/08/25 1211) Vital Signs (24h Range):  Temp:  [97.4 °F (36.3 °C)-98.1 °F (36.7 °C)] 98 °F (36.7 °C)  Pulse:  [20-81] 72  Resp:  [18-26] 20  SpO2:  [75 %-100 %] 100 %  BP: ()/(57-86) 117/57     Weight: 51.1 kg (112 lb 10.5 oz)  Body mass index is 19.34 kg/m².      Intake/Output Summary (Last 24 hours) at 1/8/2025 1423  Last data filed at 1/8/2025 0900  Gross per 24 hour   Intake 300 ml   Output --   Net 300 ml        Physical Exam  Constitutional:       General: She is not in acute distress.     Appearance: She is ill-appearing.   HENT:      Head: Normocephalic and atraumatic.      Nose: Nose normal.      Mouth/Throat:      Mouth: Mucous membranes are moist.   Eyes:      Pupils: Pupils are equal, round, and reactive to light.   Cardiovascular:      Rate and Rhythm: Normal rate and regular rhythm.      Pulses: Normal pulses.      Heart sounds: Normal heart sounds.   Pulmonary:      Effort: Pulmonary effort is normal.      Breath sounds: Normal breath sounds.      Comments: Comfortable at rest, tachypnea/shallow breathing with talking and exertion, as well as coughing fits  Abdominal:      General: Bowel sounds are normal.      Palpations: Abdomen is soft.   Musculoskeletal:         General: No swelling.      Cervical back: Normal range of motion and neck supple.   Skin:     General: Skin is  warm and dry.   Neurological:      General: No focal deficit present.      Mental Status: She is oriented to person, place, and time. Mental status is at baseline.      Cranial Nerves: No cranial nerve deficit.      Motor: No weakness.           Review of Systems   HENT:  Positive for congestion.    Respiratory:  Positive for cough and shortness of breath. Negative for wheezing and stridor.    Cardiovascular:  Negative for chest pain, palpitations and leg swelling.       Vents:  Oxygen Concentration (%): 36 (01/08/25 0211)    Lines/Drains/Airways       Drain  Duration             Female External Urinary Catheter w/ Suction 12/31/24 2230 7 days              Peripheral Intravenous Line  Duration                  Peripheral IV - Single Lumen 01/04/25 1317 22 G Left;Posterior;Proximal Forearm 4 days                    Significant Labs:    CBC/Anemia Profile:  Recent Labs   Lab 01/06/25  1610 01/07/25  0544 01/08/25  0604   WBC  --  15.81* 13.42*   HGB  --  13.2 12.9   HCT  --  40.8 42.3   PLT  --  200 180   MCV  --  96 103*   RDW  --  14.8* 15.2*   FERRITIN 146  --   --         Chemistries:  Recent Labs   Lab 01/07/25  0544 01/08/25  0604    137   K 4.3 4.3    105   CO2 19* 21*   BUN 48* 39*   CREATININE 1.3 1.1   CALCIUM 8.8 9.0   ALBUMIN 2.7* 2.7*   PROT 6.0 5.7*   BILITOT 0.3 0.4   ALKPHOS 53 52   ALT 25 24   AST 30 20   MG 1.8 1.7   PHOS 3.7 3.1       All pertinent labs within the past 24 hours have been reviewed.    Significant Imaging:  I have reviewed all pertinent imaging results/findings within the past 24 hours.    ABG  Recent Labs   Lab 01/06/25  1835   PH 7.500*   PO2 469*   PCO2 26.7*   HCO3 20.8*   BE -2     Assessment/Plan:     Pulmonary  UIP (usual interstitial pneumonitis)  Pulm fibrosis hx- followed by pulmonology outpatient  -Will need close pulm f/u after discharge    Cardiac/Vascular  Chronic systolic heart failure  Previous EF 30-40%  Repeat Echo shows:  EF of 60 - 65% with Grade I  diastolic dysfunction.     ID  * COVID-19  Patient is identified as High risk for severe complications of COVID 19 based on COVID risk score of 10   Initiate standard COVID protocols; COVID-19 testing ,Infection Control notification  and isolation- respiratory, contact and droplet per protocol    -Continue steroids x 10 days total, stop date 1/10  -Given hx of pulm fibrosis, likely won't respond to bronchodilators- scheduled nebs stopped, changed to prn  -Guaifenesin changed to Guaifenesin DM to see if that will help her coughing fits  -New Echo pending- does not seem volume overloaded on exam, consider stopping Lasix depending on BNP and repeat Echo  -CTA Chest pending  -Xanax and Morphine prn tachypnea and anxiety, educated patient about slow breathing/pursed lip breathing  -ABG has not shown hypoxia, hyperventilation with low CO2  -Discussed case with hospital medicine    1/8: Patient reports feeling much better with xanax and morphine on board  Discussed possible hospice vs. Palliative care at home, patient is interested. Says she is tired of being in and out of the hospital. Social work consulted, discussed with primary team.       Will sign off at this time. Please re-consult our team if needed.    Rosalie Camp NP  Pulmonology  [facility] ([facility])

## 2025-01-08 NOTE — ASSESSMENT & PLAN NOTE
Patient has Diastolic (HFpEF) heart failure that is Chronic. On presentation their CHF was well compensated. Most recent BNP and echo results are listed below.  Recent Labs     01/07/25  0918   *       Latest ECHO  Results for orders placed during the hospital encounter of 06/02/23    Echo    Interpretation Summary  · The left ventricle is normal in size with concentric hypertrophy and low normal systolic function.  · The estimated ejection fraction is 50%.  · Normal left ventricular diastolic function.  · There is abnormal septal wall motion consistent with left bundle branch block.  · Normal right ventricular size with normal right ventricular systolic function.  · Normal central venous pressure (3 mmHg).  · The estimated PA systolic pressure is 30 mmHg.  · Mild mitral regurgitation.    Current Heart Failure Medications  sacubitriL-valsartan 24-26 mg per tablet 1 tablet, 2 times daily, Oral  furosemide tablet 20 mg, Daily, Oral    Plan  - Monitor strict I&Os and daily weights.    - Place on telemetry  - Low sodium diet  - Place on fluid restriction of 1.5 L.   - Cardiology has not been consulted  - The patient's volume status is at their baseline  - Repeat ECHO, EF improved  -stop IV Lasix, resume home dose p.o. Lasix

## 2025-01-08 NOTE — ASSESSMENT & PLAN NOTE
Creatine stable for now. BMP reviewed- noted Estimated Creatinine Clearance: 32.9 mL/min (based on SCr of 1.1 mg/dL). according to latest data. Based on current GFR, CKD stage is stage 4 - GFR 15-29.  Monitor UOP and serial BMP and adjust therapy as needed. Renally dose meds. Avoid nephrotoxic medications and procedures.  -Creatinine 1.5 on admission however 3 months ago was 1.2    Recent Labs     01/06/25  0453 01/07/25  0544 01/08/25  0604   CREATININE 0.8 1.3 1.1         -Due to GFR, will need to dc ranexa for now and decrease dose of gabapentin  -Hold spironolactone   -Continue Entresto   -Monitor creatinine  -improving

## 2025-01-09 PROBLEM — J96.21 ACUTE ON CHRONIC RESPIRATORY FAILURE WITH HYPOXIA: Status: ACTIVE | Noted: 2025-01-09

## 2025-01-09 LAB
ALBUMIN SERPL BCP-MCNC: 2.7 G/DL (ref 3.5–5.2)
ALP SERPL-CCNC: 54 U/L (ref 40–150)
ALT SERPL W/O P-5'-P-CCNC: 21 U/L (ref 10–44)
ANION GAP SERPL CALC-SCNC: 8 MMOL/L (ref 8–16)
AST SERPL-CCNC: 19 U/L (ref 10–40)
BASOPHILS # BLD AUTO: 0.04 K/UL (ref 0–0.2)
BASOPHILS NFR BLD: 0.3 % (ref 0–1.9)
BILIRUB SERPL-MCNC: 0.3 MG/DL (ref 0.1–1)
BUN SERPL-MCNC: 40 MG/DL (ref 8–23)
CALCIUM SERPL-MCNC: 9.2 MG/DL (ref 8.7–10.5)
CHLORIDE SERPL-SCNC: 105 MMOL/L (ref 95–110)
CO2 SERPL-SCNC: 23 MMOL/L (ref 23–29)
CREAT SERPL-MCNC: 0.9 MG/DL (ref 0.5–1.4)
DIFFERENTIAL METHOD BLD: ABNORMAL
EOSINOPHIL # BLD AUTO: 0.1 K/UL (ref 0–0.5)
EOSINOPHIL NFR BLD: 0.5 % (ref 0–8)
ERYTHROCYTE [DISTWIDTH] IN BLOOD BY AUTOMATED COUNT: 15 % (ref 11.5–14.5)
EST. GFR  (NO RACE VARIABLE): >60 ML/MIN/1.73 M^2
GLUCOSE SERPL-MCNC: 90 MG/DL (ref 70–110)
HCT VFR BLD AUTO: 39.5 % (ref 37–48.5)
HGB BLD-MCNC: 11.8 G/DL (ref 12–16)
IMM GRANULOCYTES # BLD AUTO: 0.29 K/UL (ref 0–0.04)
IMM GRANULOCYTES NFR BLD AUTO: 2.4 % (ref 0–0.5)
LYMPHOCYTES # BLD AUTO: 1.4 K/UL (ref 1–4.8)
LYMPHOCYTES NFR BLD: 11.6 % (ref 18–48)
MAGNESIUM SERPL-MCNC: 1.8 MG/DL (ref 1.6–2.6)
MCH RBC QN AUTO: 30.9 PG (ref 27–31)
MCHC RBC AUTO-ENTMCNC: 29.9 G/DL (ref 32–36)
MCV RBC AUTO: 103 FL (ref 82–98)
MONOCYTES # BLD AUTO: 0.8 K/UL (ref 0.3–1)
MONOCYTES NFR BLD: 6.7 % (ref 4–15)
NEUTROPHILS # BLD AUTO: 9.4 K/UL (ref 1.8–7.7)
NEUTROPHILS NFR BLD: 78.5 % (ref 38–73)
NRBC BLD-RTO: 0 /100 WBC
PHOSPHATE SERPL-MCNC: 2.8 MG/DL (ref 2.7–4.5)
PLATELET # BLD AUTO: 160 K/UL (ref 150–450)
PMV BLD AUTO: 10.5 FL (ref 9.2–12.9)
POTASSIUM SERPL-SCNC: 4.9 MMOL/L (ref 3.5–5.1)
PROT SERPL-MCNC: 5.6 G/DL (ref 6–8.4)
RBC # BLD AUTO: 3.82 M/UL (ref 4–5.4)
SODIUM SERPL-SCNC: 136 MMOL/L (ref 136–145)
WBC # BLD AUTO: 11.97 K/UL (ref 3.9–12.7)

## 2025-01-09 PROCEDURE — 80053 COMPREHEN METABOLIC PANEL: CPT | Performed by: STUDENT IN AN ORGANIZED HEALTH CARE EDUCATION/TRAINING PROGRAM

## 2025-01-09 PROCEDURE — 25000242 PHARM REV CODE 250 ALT 637 W/ HCPCS: Performed by: STUDENT IN AN ORGANIZED HEALTH CARE EDUCATION/TRAINING PROGRAM

## 2025-01-09 PROCEDURE — 63600175 PHARM REV CODE 636 W HCPCS: Performed by: NURSE PRACTITIONER

## 2025-01-09 PROCEDURE — 21400001 HC TELEMETRY ROOM

## 2025-01-09 PROCEDURE — 36415 COLL VENOUS BLD VENIPUNCTURE: CPT | Performed by: STUDENT IN AN ORGANIZED HEALTH CARE EDUCATION/TRAINING PROGRAM

## 2025-01-09 PROCEDURE — 25000003 PHARM REV CODE 250: Performed by: NURSE PRACTITIONER

## 2025-01-09 PROCEDURE — 97530 THERAPEUTIC ACTIVITIES: CPT | Mod: CQ

## 2025-01-09 PROCEDURE — 97530 THERAPEUTIC ACTIVITIES: CPT

## 2025-01-09 PROCEDURE — 25000003 PHARM REV CODE 250: Performed by: STUDENT IN AN ORGANIZED HEALTH CARE EDUCATION/TRAINING PROGRAM

## 2025-01-09 PROCEDURE — 83735 ASSAY OF MAGNESIUM: CPT | Performed by: STUDENT IN AN ORGANIZED HEALTH CARE EDUCATION/TRAINING PROGRAM

## 2025-01-09 PROCEDURE — 94761 N-INVAS EAR/PLS OXIMETRY MLT: CPT

## 2025-01-09 PROCEDURE — 85025 COMPLETE CBC W/AUTO DIFF WBC: CPT | Performed by: STUDENT IN AN ORGANIZED HEALTH CARE EDUCATION/TRAINING PROGRAM

## 2025-01-09 PROCEDURE — 27000221 HC OXYGEN, UP TO 24 HOURS

## 2025-01-09 PROCEDURE — 97110 THERAPEUTIC EXERCISES: CPT

## 2025-01-09 PROCEDURE — 84100 ASSAY OF PHOSPHORUS: CPT | Performed by: STUDENT IN AN ORGANIZED HEALTH CARE EDUCATION/TRAINING PROGRAM

## 2025-01-09 PROCEDURE — 27000207 HC ISOLATION

## 2025-01-09 RX ADMIN — SACUBITRIL AND VALSARTAN 1 TABLET: 24; 26 TABLET, FILM COATED ORAL at 09:01

## 2025-01-09 RX ADMIN — GUAIFENESIN AND DEXTROMETHORPHAN HYDROBROMIDE 1 TABLET: 600; 30 TABLET, EXTENDED RELEASE ORAL at 08:01

## 2025-01-09 RX ADMIN — ENOXAPARIN SODIUM 50 MG: 60 INJECTION SUBCUTANEOUS at 08:01

## 2025-01-09 RX ADMIN — SACUBITRIL AND VALSARTAN 1 TABLET: 24; 26 TABLET, FILM COATED ORAL at 08:01

## 2025-01-09 RX ADMIN — FLUTICASONE PROPIONATE 100 MCG: 50 SPRAY, METERED NASAL at 10:01

## 2025-01-09 RX ADMIN — METOPROLOL TARTRATE 50 MG: 50 TABLET, FILM COATED ORAL at 09:01

## 2025-01-09 RX ADMIN — ALPRAZOLAM 0.25 MG: 0.25 TABLET ORAL at 03:01

## 2025-01-09 RX ADMIN — BENZONATATE 100 MG: 100 CAPSULE ORAL at 09:01

## 2025-01-09 RX ADMIN — LEVETIRACETAM 500 MG: 500 TABLET, FILM COATED ORAL at 08:01

## 2025-01-09 RX ADMIN — ESCITALOPRAM OXALATE 20 MG: 10 TABLET, FILM COATED ORAL at 09:01

## 2025-01-09 RX ADMIN — ALPRAZOLAM 0.25 MG: 0.25 TABLET ORAL at 06:01

## 2025-01-09 RX ADMIN — OXYCODONE HYDROCHLORIDE AND ACETAMINOPHEN 500 MG: 500 TABLET ORAL at 08:01

## 2025-01-09 RX ADMIN — MUPIROCIN: 20 OINTMENT TOPICAL at 10:01

## 2025-01-09 RX ADMIN — OXYCODONE HYDROCHLORIDE AND ACETAMINOPHEN 500 MG: 500 TABLET ORAL at 09:01

## 2025-01-09 RX ADMIN — ENOXAPARIN SODIUM 50 MG: 60 INJECTION SUBCUTANEOUS at 10:01

## 2025-01-09 RX ADMIN — GABAPENTIN 200 MG: 100 CAPSULE ORAL at 09:01

## 2025-01-09 RX ADMIN — GABAPENTIN 200 MG: 100 CAPSULE ORAL at 03:01

## 2025-01-09 RX ADMIN — THERA TABS 1 TABLET: TAB at 09:01

## 2025-01-09 RX ADMIN — MUPIROCIN: 20 OINTMENT TOPICAL at 09:01

## 2025-01-09 RX ADMIN — MONTELUKAST 10 MG: 10 TABLET, FILM COATED ORAL at 09:01

## 2025-01-09 RX ADMIN — PANTOPRAZOLE SODIUM 40 MG: 40 TABLET, DELAYED RELEASE ORAL at 10:01

## 2025-01-09 RX ADMIN — BENZONATATE 100 MG: 100 CAPSULE ORAL at 08:01

## 2025-01-09 RX ADMIN — GABAPENTIN 200 MG: 100 CAPSULE ORAL at 08:01

## 2025-01-09 RX ADMIN — CLOPIDOGREL BISULFATE 75 MG: 75 TABLET ORAL at 09:01

## 2025-01-09 RX ADMIN — ATORVASTATIN CALCIUM 40 MG: 40 TABLET, FILM COATED ORAL at 09:01

## 2025-01-09 RX ADMIN — LEVETIRACETAM 500 MG: 500 TABLET, FILM COATED ORAL at 09:01

## 2025-01-09 RX ADMIN — MORPHINE SULFATE 2 MG: 2 INJECTION, SOLUTION INTRAMUSCULAR; INTRAVENOUS at 09:01

## 2025-01-09 RX ADMIN — FUROSEMIDE 20 MG: 20 TABLET ORAL at 09:01

## 2025-01-09 RX ADMIN — METOPROLOL TARTRATE 50 MG: 50 TABLET, FILM COATED ORAL at 08:01

## 2025-01-09 RX ADMIN — DEXAMETHASONE SODIUM PHOSPHATE 6 MG: 4 INJECTION INTRA-ARTICULAR; INTRALESIONAL; INTRAMUSCULAR; INTRAVENOUS; SOFT TISSUE at 10:01

## 2025-01-09 RX ADMIN — BENZONATATE 100 MG: 100 CAPSULE ORAL at 03:01

## 2025-01-09 RX ADMIN — PANTOPRAZOLE SODIUM 40 MG: 40 TABLET, DELAYED RELEASE ORAL at 08:01

## 2025-01-09 NOTE — CONSULTS
Spoke to Augustus with [facility].  They do go to the [place] area.  He will see patient this morning and follow up with daughters.

## 2025-01-09 NOTE — PROGRESS NOTES
[facility] ([facility])  Wound Care    Patient Name:  Shireen Felix   MRN:  [id number]  Date: 2025  Diagnosis: COVID-19    History:     Past Medical History:   Diagnosis Date    Abnormal ECG 2019    Acute bronchitis and bronchiolitis 2024    Aneurysm     Anticoagulant long-term use     Arthritis     CAD in native artery 2019    Diabetes mellitus     Fall 10/10/2019    Formatting of this note might be different from the original. Found sitting on floor next to bed last night Mild confusion today Does not recall falling or how she ended up on floor UA today Labs yesterday unremarkable    Glaucoma     Hypertension     Pulmonary fibrosis     Seizures     Shingles 2017    Stroke        Social History     Socioeconomic History    Marital status: Single   Tobacco Use    Smoking status: Former     Current packs/day: 0.00     Average packs/day: 1 pack/day for 42.3 years (42.3 ttl pk-yrs)     Types: Cigarettes     Start date:      Quit date: 2004     Years since quittin.7    Smokeless tobacco: Former   Substance and Sexual Activity    Alcohol use: No    Drug use: Never    Sexual activity: Not Currently     Partners: Male   Social History Narrative    No pets or smokers in household.     Social Drivers of Health     Financial Resource Strain: Low Risk  (2025)    Overall Financial Resource Strain (CARDIA)     Difficulty of Paying Living Expenses: Not very hard   Food Insecurity: No Food Insecurity (2025)    Hunger Vital Sign     Worried About Running Out of Food in the Last Year: Never true     Ran Out of Food in the Last Year: Never true   Transportation Needs: No Transportation Needs (2025)    TRANSPORTATION NEEDS     Transportation : No   Physical Activity: Unknown (2024)    Exercise Vital Sign     Days of Exercise per Week: 3 days   Recent Concern: Physical Activity - Inactive (2024)    Received from [facility]  and Its Subsidiaries and Affiliates    Exercise Vital Sign     Days of Exercise per Week: 0 days     Minutes of Exercise per Session: 0 min   Stress: No Stress Concern Present (1/2/2025)    [employer] - Occupational Stress Questionnaire     Feeling of Stress : Only a little   Housing Stability: Low Risk  (1/2/2025)    Housing Stability Vital Sign     Unable to Pay for Housing in the Last Year: No     Homeless in the Last Year: No       Precautions:     Allergies as of 12/31/2024 - Reviewed 12/31/2024   Allergen Reaction Noted    Penicillins Anaphylaxis, Hives, Shortness Of Breath, and Swelling 06/29/2012    Penicillin  05/15/2024    Adhesive Rash 08/26/2014    Doxycycline Nausea Only 04/26/2017    Oxycodone Other (See Comments) 04/11/2019    Sulfa (sulfonamide antibiotics) Itching 04/11/2019       [facility] Assessment Details/Treatment     F/U visit with this 78 y/o female patient for ongoing wound care to sacrum.   Patient resting in bed, turned on foam wedge to Right side.   Patient on airborne and contact preautions, proper PPE donned prior to entering.   --Foam wedge removed. Sacral bordered foam gently peeled back. Chronic pressure injury that was present last visit now appears resolved. Skin is now intact with surrounding blanchable redness. Cleansed with bath wipes. Patted dry. Applied zinc oxide moisture barrier paste. Reapplied bordered sacral foam. Foam wedge replaced.   Recommend continued pressure injury preventions.   Will sign off. Reconsult as needed.          01/09/25 1037   WOCN Assessment   WOCN Total Time (mins) 45   Visit Date 01/09/25   Visit Time 1037   Consult Type New   WOCN Speciality Wound   Wound pressure;At risk for pressure Injury   Intervention assessed;changed;applied;chart review;orders   Teaching on-going       01/09/2025

## 2025-01-09 NOTE — ASSESSMENT & PLAN NOTE
Creatine stable for now. BMP reviewed- noted Estimated Creatinine Clearance: 40.2 mL/min (based on SCr of 0.9 mg/dL). according to latest data. Based on current GFR, CKD stage is stage 4 - GFR 15-29.  Monitor UOP and serial BMP and adjust therapy as needed. Renally dose meds. Avoid nephrotoxic medications and procedures.  -Creatinine 1.5 on admission however 3 months ago was 1.2    Recent Labs     01/07/25  0544 01/08/25  0604 01/09/25  0508   CREATININE 1.3 1.1 0.9         -Due to GFR, will need to dc ranexa for now and decrease dose of gabapentin  -Hold spironolactone   -Continue Entresto   -Monitor creatinine  -stable

## 2025-01-09 NOTE — ASSESSMENT & PLAN NOTE
Anemia is likely due to chronic disease due to Chronic Kidney Disease. Most recent hemoglobin and hematocrit are listed below.  Recent Labs     01/07/25  0544 01/08/25  0604 01/09/25  0508   HGB 13.2 12.9 11.8*   HCT 40.8 42.3 39.5       Plan  - Monitor serial CBC: Daily  - Transfuse PRBC if patient becomes hemodynamically unstable, symptomatic or H/H drops below 7/21.  - Patient has not received any PRBC transfusions to date  - Patient's anemia is currently stable

## 2025-01-09 NOTE — PLAN OF CARE
Nutrition Recommendations/Interventions on 1/9/25:  Continue cardiac diet as tolerated.   Will add Boost Plus BID and Gt BID to help meet estimated kcal/protein needs for wound healing.  Monitor % intake meals + supplements, weight, labs, progression of wound healing.    Goals:  Goal: Meet greater than 80% of nutritional needs by follow-up. (new)  Goal: Consume % of oral supplements by follow-up. (new)    Ashley Alonso RD, LDN, CNSC   Attending

## 2025-01-09 NOTE — PROGRESS NOTES
[facility] Medicine  Progress Note    Patient Name: Shireen Felix  MRN: [id number]  Patient Class: IP- Inpatient   Admission Date: 12/31/2024  Length of Stay: 8 days  Attending Physician: Everardo Graf MD  Primary Care Provider: Laron Chaudhari MD        Subjective     Principal Problem:COVID-19        HPI:  Shireen Felix is a 79 year old female who has  has a past medical history of Abnormal ECG, Acute bronchitis and bronchiolitis, Aneurysm, Anticoagulant long-term use, Arthritis, CAD in native artery, COPD (chronic obstructive pulmonary disease), Diabetes mellitus, Fall, Glaucoma, Hypertension, Seizures, Shingles, and Stroke who presented to Emergency Department for evaluation for cough. Associated symptoms include: congestion, wheezing, and SOB. She was seen by her pulmonologist today, Dr. Sam. Started as sinus pain and drainage and now has progressed to respiratory symptoms. She was seen a few days ago in the ER given prescription for antibiotics steroids but these have not helped much. Today in the office she can barely talk without coughing continually. She is having a difficult time putting more than 3 words together without severe cough paroxysms. ED workup showed Hgb/Hct 11.9/37.0, K+ 3.1, CO2 16, BUN/creatinine 34/1.5. CT chest w/out contrast stable suspected pulmonary fibrosis of the lungs. No acute infiltrates. She has received DuoNeb x 1, solumedrol 125 mg IV x 1, KCL 40 mEq PO x 1 and NS 1L bolus. Pt admitted for COVID.     Overview/Hospital Course:  Admitted to Hospital Medicine for acute on chronic respiratory failure concerns in setting of COVID-19.  Started on IV methylprednisolone, remdesivir, scheduled respiratory treatments. 01/04: Reported left lower quadrant abdominal pain with tenderness to palpation, will evaluate via CT as patient is on therapeutic anticoagulation for COVID-19.  CT abdomen/pelvis nonacute. 01/05:  Reported improvements  in respiratory status with decreased frequency of cough and dyspnea but still reporting significant symptoms from baseline.  On 1/6, patient had a significant event with acute tachypnea, tachycardia. Pulmonology consulted, orders adjusted. Patient stable overnight, had another event on 1/7 similar to previous day. Possible exacerbation related to anxiety provoked by medications and congestion. Case reviewed, orders adjusted. Per Pulmonology, due to underlying lung fibrosis, patient is likely not benefiting from bronchodilators. Stopped on 1/7, patient did not experience any further episodes of anxiety, respiratory distress. Will continue xanax and prn morphine. Difficulty with discharge plan, patient originally planned to d/c to SNF, now prefers to d/c home.  Pulmonology discussed home palliative care vs hospice.  Patient and family wish to discuss the Services further.  Per discussion with patient, family will be available for an informational visit on Monday.     Interval History: Planning for home palliative care vs hospice. Patient and family no longer want to d/c to SNF.     Review of Systems   Constitutional:  Positive for activity change, appetite change and fatigue.   HENT:  Positive for congestion.    Respiratory:  Positive for cough (productive), chest tightness, shortness of breath, wheezing and stridor.    Cardiovascular:  Negative for chest pain.   Gastrointestinal:  Positive for abdominal distention and abdominal pain.   Musculoskeletal:  Positive for arthralgias.   Neurological:  Positive for weakness and headaches.   All other systems reviewed and are negative.    Objective:     Vital Signs (Most Recent):  Temp: 97.6 °F (36.4 °C) (01/09/25 0616)  Pulse: 67 (01/09/25 0821)  Resp: 17 (01/09/25 0937)  BP: 136/60 (01/09/25 0937)  SpO2: 98 % (01/09/25 0929) Vital Signs (24h Range):  Temp:  [97.6 °F (36.4 °C)-98.7 °F (37.1 °C)] 97.6 °F (36.4 °C)  Pulse:  [20-83] 67  Resp:  [16-22] 17  SpO2:  [96 %-100 %]  98 %  BP: ()/(50-73) 136/60     Weight: 51.1 kg (112 lb 10.5 oz)  Body mass index is 19.34 kg/m².    Intake/Output Summary (Last 24 hours) at 1/9/2025 0958  Last data filed at 1/8/2025 1700  Gross per 24 hour   Intake 480 ml   Output --   Net 480 ml         Physical Exam  Vitals and nursing note reviewed.   Constitutional:       General: She is not in acute distress.     Appearance: She is underweight. She is ill-appearing.   HENT:      Head: Normocephalic and atraumatic.      Right Ear: Hearing and external ear normal.      Left Ear: Hearing and external ear normal.      Nose: No rhinorrhea.      Right Sinus: No maxillary sinus tenderness or frontal sinus tenderness.      Left Sinus: No maxillary sinus tenderness or frontal sinus tenderness.      Mouth/Throat:      Mouth: No oral lesions.      Pharynx: Uvula midline.   Eyes:      General:         Right eye: No discharge.         Left eye: No discharge.      Conjunctiva/sclera: Conjunctivae normal.      Pupils: Pupils are equal, round, and reactive to light.   Neck:      Thyroid: No thyromegaly.      Vascular: No carotid bruit.      Trachea: No tracheal deviation.   Cardiovascular:      Rate and Rhythm: Normal rate and regular rhythm.      Pulses:           Dorsalis pedis pulses are 1+ on the right side and 1+ on the left side.      Heart sounds: Normal heart sounds, S1 normal and S2 normal. No murmur heard.  Pulmonary:      Effort: Tachypnea present. No respiratory distress.      Breath sounds: Examination of the right-upper field reveals rhonchi. Examination of the left-upper field reveals rhonchi. Examination of the right-lower field reveals decreased breath sounds. Examination of the left-lower field reveals decreased breath sounds. Decreased breath sounds and rhonchi present.   Abdominal:      General: Bowel sounds are normal. There is distension.      Palpations: Abdomen is soft. There is no mass.      Tenderness: There is abdominal tenderness in the  suprapubic area and left lower quadrant.   Musculoskeletal:         General: Normal range of motion.      Cervical back: Normal range of motion.   Lymphadenopathy:      Cervical: No cervical adenopathy.      Upper Body:      Right upper body: No supraclavicular adenopathy.      Left upper body: No supraclavicular adenopathy.   Skin:     General: Skin is warm and dry.      Capillary Refill: Capillary refill takes less than 2 seconds.      Coloration: Skin is pale.      Findings: No rash.   Neurological:      Mental Status: She is oriented to person, place, and time. She is lethargic.   Psychiatric:         Mood and Affect: Mood is anxious. Mood is not depressed. Affect is labile and tearful.         Speech: Speech is rapid and pressured.         Behavior: Behavior is hyperactive.         Judgment: Judgment is impulsive.             Significant Labs: All pertinent labs within the past 24 hours have been reviewed.  CBC:   Recent Labs   Lab 01/08/25  0604 01/09/25  0508   WBC 13.42* 11.97   HGB 12.9 11.8*   HCT 42.3 39.5    160     CMP:   Recent Labs   Lab 01/08/25  0604 01/09/25  0508    136   K 4.3 4.9    105   CO2 21* 23   GLU 90 90   BUN 39* 40*   CREATININE 1.1 0.9   CALCIUM 9.0 9.2   PROT 5.7* 5.6*   ALBUMIN 2.7* 2.7*   BILITOT 0.4 0.3   ALKPHOS 52 54   AST 20 19   ALT 24 21   ANIONGAP 11 8       Significant Imaging: I have reviewed all pertinent imaging results/findings within the past 24 hours.    Assessment and Plan     * COVID-19  Patient is identified as Severe COVID-19 based on hypoxemia with O2 saturations <94% on room air or on ambulation   Initiate standard COVID protocols; COVID-19 testing ,Infection Control notification  and isolation- respiratory, contact and droplet per protocol    Diagnostics: CBC, CMP, Ferritin, CRP, Troponin, and Portable CXR    Management: Initiate targeted therapy with Remdesivir, 200mg IV x1, followed by 100mg IV daily x5 days total and Anticoagulation,  Patient admitted to non-critical care unit- Will initiate full dose anticoagulation with Weight based lovenox 1mg/kg IV q12, Maintain oxygen saturations 92-96% via Nasal Cannula  LPM and monitor with continuous/intermittent pulse oximetry. , Inhaled bronchodilators as needed for shortness of breath., and Continuous cardiac monitoring.    Advance Care Planning Current advance care plan has been discussed with patient/family/POA and patient currently wishes Full Code.     Acute on chronic respiratory failure with hypoxia  Patient with Hypoxic Respiratory failure which is Acute on chronic.  she is not on home oxygen. Supplemental oxygen was provided and noted- Oxygen Concentration (%):  [32] 32    .   Signs/symptoms of respiratory failure include- tachypnea, increased work of breathing, respiratory distress, use of accessory muscles, wheezing, stridor, and lethargy. Contributing diagnoses includes - Interstitial lung disease Labs and images were reviewed. Patient Has recent ABG, which has been reviewed. Will treat underlying causes and adjust management of respiratory failure as follows-     Home Oxygen evaluation performed:  Home Oxygen Evaluation - [facility] - Cardiopulmonary Department      Date Performed: 1/8/2025     1)Patient's Home O2 Sat on room air, while at rest: Room Air SpO2 At Rest: 99 %                             If O2 sats on room air at rest are 88% or below, patient qualifies.  Document O2 liter flow needed in Step 2.  If O2 sats are 89% or above, complete Step 3.        2)         If patient is not ambulated and O2 sats are <88%, what is the O2 liter flow required to meet ordered saturation? Home O2 Eval Comments: pt qualifies for home oxygen     If O2 sats on room air while exercising remain 89% or above patient does not qualify, no further testing needed Document N/A in step 3. If O2 sats on room air while exercising are 88% or below, continue to Step 4.     3)Patient's O2 Sat on room air  while exercising: Room Air SpO2 During Ambulation: (!) 78 %                             4)Patient's O2 Sat while exercising on O2: SpO2 During Ambulation on O2: 96 % at Ambulation O2 LPM: 4 LPM                             (Must show improvement from #4 for patients to qualify)    Patient qualifies for Home O2 @4L NC due to exertional dyspnea, hypoxia    Hypokalemia  Patient's most recent potassium results are listed below.   Recent Labs     01/07/25  0544 01/08/25  0604 01/09/25  0508   K 4.3 4.3 4.9       Plan  - Replete potassium per protocol  - Monitor potassium Daily  - Patient's hypokalemia is  being treated. Received KCL 40 mEq PO x 1 in ED  -  Stable    CKD (chronic kidney disease)  Creatine stable for now. BMP reviewed- noted Estimated Creatinine Clearance: 40.2 mL/min (based on SCr of 0.9 mg/dL). according to latest data. Based on current GFR, CKD stage is stage 4 - GFR 15-29.  Monitor UOP and serial BMP and adjust therapy as needed. Renally dose meds. Avoid nephrotoxic medications and procedures.  -Creatinine 1.5 on admission however 3 months ago was 1.2    Recent Labs     01/07/25  0544 01/08/25  0604 01/09/25  0508   CREATININE 1.3 1.1 0.9         -Due to GFR, will need to dc ranexa for now and decrease dose of gabapentin  -Hold spironolactone   -Continue Entresto   -Monitor creatinine  -stable    Chronic systolic heart failure  Patient has Diastolic (HFpEF) heart failure that is Chronic. On presentation their CHF was well compensated. Most recent BNP and echo results are listed below.  Recent Labs     01/07/25  0918   *       Latest ECHO  Results for orders placed during the hospital encounter of 06/02/23    Echo    Interpretation Summary  · The left ventricle is normal in size with concentric hypertrophy and low normal systolic function.  · The estimated ejection fraction is 50%.  · Normal left ventricular diastolic function.  · There is abnormal septal wall motion consistent with left bundle  branch block.  · Normal right ventricular size with normal right ventricular systolic function.  · Normal central venous pressure (3 mmHg).  · The estimated PA systolic pressure is 30 mmHg.  · Mild mitral regurgitation.    Current Heart Failure Medications  sacubitriL-valsartan 24-26 mg per tablet 1 tablet, 2 times daily, Oral  furosemide tablet 20 mg, Daily, Oral    Plan  - Monitor strict I&Os and daily weights.    - Place on telemetry  - Low sodium diet  - Place on fluid restriction of 1.5 L.   - Cardiology has not been consulted  - The patient's volume status is at their baseline  - Repeat ECHO, EF improved  -stop IV Lasix, resume home dose p.o. Lasix           Mixed hyperlipidemia  Lab Results   Component Value Date    CHOL 112 (L) 07/12/2024    HDL 41 07/12/2024    LDLCALC 49.8 (L) 07/12/2024    TRIG 106 07/12/2024     -Continue Statin    Normocytic anemia  Anemia is likely due to chronic disease due to Chronic Kidney Disease. Most recent hemoglobin and hematocrit are listed below.  Recent Labs     01/07/25  0544 01/08/25  0604 01/09/25  0508   HGB 13.2 12.9 11.8*   HCT 40.8 42.3 39.5       Plan  - Monitor serial CBC: Daily  - Transfuse PRBC if patient becomes hemodynamically unstable, symptomatic or H/H drops below 7/21.  - Patient has not received any PRBC transfusions to date  - Patient's anemia is currently stable    Seizure  Hx of seizures, patient has not had a seizure during this admission.     -Continue Keppra  -Seizure precautions      Coronary artery disease of native artery of native heart with stable angina pectoris  Patient with known CAD, which is controlled Will continue Plavix and Statin and monitor for S/Sx of angina/ACS. Continue to monitor on telemetry.     UIP (usual interstitial pneumonitis)  Pulm fibrosis hx- followed by pulmonology outpatient  -Will need close pulm f/u after discharge        GERD (gastroesophageal reflux disease)  -Continue PPI        VTE Risk Mitigation (From admission,  onward)           Ordered     enoxaparin injection 50 mg  2 times daily         12/31/24 1518                    Discharge Planning   KATHERINE:      Code Status: Full Code   Medical Readiness for Discharge Date:   Discharge Plan A: Skilled Nursing Facility   Discharge Delays: None known at this time            Please place Justification for DME        Emi Jenkins NP  Department of Hospital Medicine   '[place] - Telemetry ([facility])

## 2025-01-09 NOTE — ASSESSMENT & PLAN NOTE
Pulm fibrosis hx- followed by pulmonology outpatient  -Will need close pulm f/u after discharge       Consent (Spinal Accessory)/Introductory Paragraph: The rationale for Mohs was explained to the patient and consent was obtained. The risks, benefits and alternatives to therapy were discussed in detail. Specifically, the risks of damage to the spinal accessory nerve, infection, scarring, bleeding, prolonged wound healing, incomplete removal, allergy to anesthesia, and recurrence were addressed. Prior to the procedure, the treatment site was clearly identified and confirmed by the patient. All components of Universal Protocol/PAUSE Rule completed.

## 2025-01-09 NOTE — ASSESSMENT & PLAN NOTE
Patient with Hypoxic Respiratory failure which is Acute on chronic.  she is not on home oxygen. Supplemental oxygen was provided and noted- Oxygen Concentration (%):  [32] 32    .   Signs/symptoms of respiratory failure include- tachypnea, increased work of breathing, respiratory distress, use of accessory muscles, wheezing, stridor, and lethargy. Contributing diagnoses includes - Interstitial lung disease Labs and images were reviewed. Patient Has recent ABG, which has been reviewed. Will treat underlying causes and adjust management of respiratory failure as follows-     Home Oxygen evaluation performed:  Home Oxygen Evaluation - [facility] - Cardiopulmonary Department      Date Performed: 1/8/2025     1)Patient's Home O2 Sat on room air, while at rest: Room Air SpO2 At Rest: 99 %                             If O2 sats on room air at rest are 88% or below, patient qualifies.  Document O2 liter flow needed in Step 2.  If O2 sats are 89% or above, complete Step 3.        2)         If patient is not ambulated and O2 sats are <88%, what is the O2 liter flow required to meet ordered saturation? Home O2 Eval Comments: pt qualifies for home oxygen     If O2 sats on room air while exercising remain 89% or above patient does not qualify, no further testing needed Document N/A in step 3. If O2 sats on room air while exercising are 88% or below, continue to Step 4.     3)Patient's O2 Sat on room air while exercising: Room Air SpO2 During Ambulation: (!) 78 %                             4)Patient's O2 Sat while exercising on O2: SpO2 During Ambulation on O2: 96 % at Ambulation O2 LPM: 4 LPM                             (Must show improvement from #4 for patients to qualify)    Patient qualifies for Home O2 @4L NC due to exertional dyspnea, hypoxia

## 2025-01-09 NOTE — PROGRESS NOTES
Inpatient Nutrition Assessment    Admit Date: 12/31/2024   Total duration of encounter: 9 days   Patient Age: 79 y.o.    Nutrition Recommendation/Prescription     Continue cardiac diet as tolerated.   Will add Boost Plus BID and Gt BID to help meet estimated kcal/protein needs for wound healing.  Monitor % intake meals + supplements, weight, labs, progression of wound healing.    Communication of Recommendations:  EMR    Nutrition Assessment     Malnutrition Assessment/Nutrition-Focused Physical Exam       Malnutrition Level: other (see comments) (unable to determine) (01/09/25 1018)  Energy Intake (Malnutrition): other (see comments) (unable to assess) (01/09/25 1018)  Weight Loss (Malnutrition): other (see comments) (unable to assess) (01/09/25 1018)  Subcutaneous Fat (Malnutrition): other (see comments) (unable to assess - dietitian working remotely) (01/09/25 1018)           Muscle Mass (Malnutrition): other (see comments) (unable to assess - dietitian working remotely) (01/09/25 1018)                          Fluid Accumulation (Malnutrition): other (see comments) (no edema per flowsheets) (01/09/25 1018)        A minimum of two characteristics is recommended for diagnosis of either severe or non-severe malnutrition.    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  UIP (usual interstitial pneumonitis)  GERD (gastroesophageal reflux disease)    Relevant Medical History:  diabetes mellitus, hypertension, stroke, CAD, pulmonary fibrosis     Scheduled Medications:  ascorbic acid (vitamin C), 500 mg,  BID  atorvastatin, 40 mg, Daily  benzonatate, 100 mg, TID  clopidogreL, 75 mg, Daily  dexAMETHasone, 6 mg, Daily  dextromethorphan-guaiFENesin  mg, 1 tablet, BID  enoxparin, 1 mg/kg, BID  EScitalopram oxalate, 20 mg, Daily  fluticasone propionate, 2 spray, Daily  furosemide, 20 mg, Daily  gabapentin, 200 mg, TID  levETIRAcetam, 500 mg, BID  metoprolol tartrate, 50 mg, BID  montelukast, 10 mg, Daily  multivitamin, 1 tablet, Daily  mupirocin, , BID  pantoprazole, 40 mg, BID  polyethylene glycol, 17 g, Daily  sacubitriL-valsartan, 1 tablet, BID    Continuous Infusions:   PRN Medications:  acetaminophen, 650 mg, Q6H PRN  albuterol-ipratropium, 3 mL, Q4H PRN  ALPRAZolam, 0.25 mg, TID PRN  dextrose 50%, 12.5 g, PRN  dextrose 50%, 25 g, PRN  glucagon (human recombinant), 1 mg, PRN  glucose, 16 g, PRN  glucose, 24 g, PRN  HYDROcodone-acetaminophen, 1 tablet, Q6H PRN  melatonin, 6 mg, Nightly PRN  methocarbamoL, 500 mg, TID PRN  morphine, 2 mg, Q4H PRN  ondansetron, 4 mg, Q8H PRN  sodium chloride 0.9%, 10 mL, PRN    Calorie Containing IV Medications: no significant kcals from medications at this time    Recent Labs   Lab 01/03/25  0510 01/04/25  0440 01/05/25  0531 01/06/25  0453 01/07/25  0544 01/08/25  0604 01/09/25  0508    136 136 136 139 137 136   K 4.2 4.5 4.2 5.5* 4.3 4.3 4.9   CALCIUM 8.8 8.5* 8.4* 8.4* 8.8 9.0 9.2   PHOS 2.6* 2.6* 2.9 3.0 3.7 3.1 2.8   MG 2.0 1.8 2.1 2.0 1.8 1.7 1.8   * 110 108 106 105 105 105   CO2 18* 17* 19* 20* 19* 21* 23   BUN 28* 29* 27* 32* 48* 39* 40*   CREATININE 0.9 0.9 0.8 0.8 1.3 1.1 0.9   EGFRNORACEVR >60 >60 >60 >60 42* 51* >60   BILITOT 0.2 0.2 0.3 0.3 0.3 0.4 0.3   ALKPHOS 50 43 52 47 53 52 54   ALT 9* 13 22 22 25 24 21   AST 10 17 19 27 30 20 19   ALBUMIN 2.5* 2.3* 2.5* 2.5* 2.7* 2.7* 2.7*   WBC 10.83 9.19 9.97 12.55 15.81* 13.42* 11.97   HGB 12.0 11.6* 12.3 12.7 13.2 12.9 11.8*   HCT 39.0 38.1 40.6 41.0 40.8 42.3 39.5     Nutrition Orders:  Diet Cardiac Standard  "Tray      Appetite/Oral Intake: good/% of meals  Factors Affecting Nutritional Intake: none identified  Social Needs Impacting Access to Food: unable to assess at this time; will attempt on follow-up  Food/Evangelical/Cultural Preferences: unable to obtain  Food Allergies: no known food allergies  Last Bowel Movement: 01/07/25  Wound(s):  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 Attempted to reach pt by phone without success. Currently on cardiac diet. Intake % of most meals documented in flowsheets. LBM 1/7. Weight recordings ranging from 108-122 lb this admit. No edema noted. Chart/review past records suggest weight gain over the last few months but weight loss in the first few months of 2024. Will need to clarify weight history with pt at f/u visit.     Anthropometrics    Height: 5' 4" (162.6 cm), Height Method: Stated  Last Weight: 51.1 kg (112 lb 10.5 oz) (01/07/25 2328), Weight Method: Bed Scale  BMI (Calculated): 19.3  BMI Classification: underweight (BMI less than 22 if >65 years of age)     Ideal Body Weight (IBW), Female: 120 lb     % Ideal Body Weight, Female (lb): 99.17 %                             Usual Weight Provided By: EMR weight history    Wt Readings from Last 5 Encounters:   01/07/25 51.1 kg (112 lb 10.5 oz)   12/31/24 49.2 kg (108 lb 7.5 oz)   12/05/24 49.2 kg (108 lb 7.5 oz)   11/21/24 48.3 kg (106 lb 7.7 oz)   09/04/24 46.4 kg (102 lb 4.7 oz)     Weight Change(s) Since Admission:   Wt Readings from Last 1 Encounters:   01/07/25 2328 51.1 kg (112 lb 10.5 oz)   01/07/25 0909 54 kg (119 lb)   01/04/25 2321 55.4 kg (122 lb 2.2 oz)   01/04/25 0009 53.4 kg (117 lb 11.6 oz)   01/02/25 2010 54.4 kg (119 lb 14.9 oz)   01/01/25 2346 52 kg (114 lb 10.2 oz)   12/31/24 6670 51.3 kg (113 lb 1.5 oz)   12/31/24 1146 49.2 kg (108 lb 6.4 oz)   Admit Weight: 49.2 kg (108 lb 6.4 oz) (12/31/24 1146), Weight Method: Standard Scale    Estimated " Needs    Weight Used For Calorie Calculations: 51.1 kg (112 lb 10.5 oz)  Energy Calorie Requirements (kcal): 1893-1851 kcal (30-35 kcal/kg) (PI)  Energy Need Method: Kcal/kg  Weight Used For Protein Calculations: 51.1 kg (112 lb 10.5 oz)  Protein Requirements: 61-77 g (1.2-1.5 g/kg) (PI, GFR > 60)  Fluid Requirements (mL): 9694-4687 mL (30 mL/kg) or per MD  CHO Requirement: 178-204 g (50% estimated needs)     Enteral Nutrition     Patient not receiving enteral nutrition at this time.    Parenteral Nutrition     Patient not receiving parenteral nutrition support at this time.    Evaluation of Received Nutrient Intake    Calories: meeting estimated needs  Protein: meeting estimated needs    Patient Education     Not applicable.    Nutrition Diagnosis     PES: Increased nutrient needs (kcal, protein) related to increased protein energy demand for wound healing as evidenced by Stage 2 pressure injury. (new)       Nutrition Interventions     Intervention(s): modified composition of meals/snacks, commercial beverage, and collaboration with other providers    Goal: Meet greater than 80% of nutritional needs by follow-up. (new)  Goal: Consume % of oral supplements by follow-up. (new)    Nutrition Goals & Monitoring     Dietitian will monitor: energy intake, weight, electrolyte/renal panel, and gastrointestinal profile  Discharge planning: continue cardiac diet with Gt, Boost oral supplements  Nutrition Risk/Follow-Up: moderate (follow-up in 3-5 days)   Please consult if re-assessment needed sooner.

## 2025-01-09 NOTE — PT/OT/SLP PROGRESS
Physical Therapy  Treatment    Shireen Felix   MRN: [id number]   Admitting Diagnosis: COVID-19    PT Received On: 01/09/25  PT Start Time: 0745     PT Stop Time: 0810    PT Total Time (min): 25 min       Billable Minutes:  Therapeutic Activity 25    Treatment Type: Treatment  PT/PTA: PTA     Number of PTA visits since last PT visit: 1       General Precautions: Standard, airborne, contact, droplet, fall  Orthopedic Precautions: N/A  Braces: N/A  Respiratory Status: Nasal cannula, flow 3 L/min    Spiritual, Cultural Beliefs, Restorationism Practices, Values that Affect Care: no    Subjective:  Communicated with patient's nurse, Samia, and completed Epic chart review prior to session.  Patient initially refused but agreed to PT session with encouragement from therapist.     Pain/Comfort  Pain Rating 1: 0/10  Pain Rating Post-Intervention 1: 0/10    Objective:   Patient found with: peripheral IV, oxygen, pulse ox (continuous), PureWick    Supine > sit EOB: Min A     Forward scoot towards EOB: CGA    Seated EOB x 10 min total focusing on increased tolerance to upright position, core stability, trunk control and CV endurance.   Maintained self supported sitting balance with CGA  Unable to maintain unsupported sitting balance at this time.    STS from EOB > RW: Max A (sustained B knee flexion throughout; difficulty with coordination of hand placement)    Stand pivot T/F to chair w/ RW: Mod A of 2 (Max VC for safety w/ RW mgmt and sequencing of task; mild buckling noted)    Reviewed AROM TE to BLE including: hip flex/ext, knee flex/ext, ankle PF/DF  To be completed a minimum of 10 reps for each LE in order to promote return of function, strength and ROM.     Educated patient on importance of increased tolerance to upright position and direct impact on CV endurance and strength. Patient encouraged to sit up in chair/ EOB, for a minimum of 2 consecutive hours, 3x per day. Encouraged patient to perform AROM TE to BLE  throughout the day within all available planes of motion. Re enforced importance of utilizing call light to meet needs in room and not attempt to get up without staff assistance. Patient verbalized understanding and agreed to comply.     AM-PAC 6 CLICK MOBILITY  How much help from another person does this patient currently need?   1 = Unable, Total/Dependent Assistance  2 = A lot, Maximum/Moderate Assistance  3 = A little, Minimum/Contact Guard/Supervision  4 = None, Modified [place]/Independent    Turning over in bed (including adjusting bedclothes, sheets and blankets)?: 3  Sitting down on and standing up from a chair with arms (e.g., wheelchair, bedside commode, etc.): 3  Moving from lying on back to sitting on the side of the bed?: 3  Moving to and from a bed to a chair (including a wheelchair)?: 3  Need to walk in hospital room?: 1  Climbing 3-5 steps with a railing?: 1 (NT)  Basic Mobility Total Score: 14    AM-PAC Raw Score CMS G-Code Modifier Level of Impairment Assistance   6 % Total / Unable   7 - 9 CM 80 - 100% Maximal Assist   10 - 14 CL 60 - 80% Moderate Assist   15 - 19 CK 40 - 60% Moderate Assist   20 - 22 CJ 20 - 40% Minimal Assist   23 CI 1-20% SBA / CGA   24 CH 0% Independent/ Mod I     Patient left up in chair with call button in reach and chair alarm on.    Assessment:  Shireen Felix is a 79 y.o. female with a medical diagnosis of COVID-19 and presents with overall decline in functional mobility. Patient would continue to benefit from skilled PT to address functional limitations listed below in order to return to PLOF/decrease caregiver burden. Patient progress limited by fluctuating O2 sats and coughing episodes throughout session. Unable to progress to forward ambulation this session due to safety concerns and buckling of knees.     Rehab identified problem list/impairments: weakness, impaired endurance, impaired self care skills, impaired functional mobility, gait  instability, impaired balance, decreased safety awareness, decreased lower extremity function, decreased upper extremity function, decreased coordination, impaired cognition, decreased ROM, impaired cardiopulmonary response to activity    Rehab potential is fair.    Activity tolerance: Fair    Discharge recommendations: Moderate Intensity Therapy      Barriers to discharge:      Equipment recommendations: to be determined by next level of care     GOALS:   Multidisciplinary Problems       Physical Therapy Goals          Problem: Physical Therapy    Goal Priority Disciplines Outcome Interventions   Physical Therapy Goal     PT, PT/OT Progressing    Description: Goals to be met by 1/17/25.  1. Pt will complete bed mobility SBA.  2. Pt will complete sit to stand SBA.  3. Pt will ambulate 100ft SBA using RW.  4. Pt will increase AMPAC score by 2 points to progress functional mobility.                       PLAN:    Patient to be seen 3 x/week to address the above listed problems via gait training, therapeutic activities, therapeutic exercises  Plan of Care expires: 01/17/25  Plan of Care reviewed with: patient         01/09/2025

## 2025-01-09 NOTE — PT/OT/SLP PROGRESS
"Occupational Therapy   Treatment    Name: Shireen Felix  MRN: [id number]  Admitting Diagnosis:  COVID-19       Recommendations:     Discharge Recommendations: Moderate Intensity Therapy  Discharge Equipment Recommendations:  to be determined by next level of care  Barriers to discharge:  Decreased caregiver support    Assessment:     Shireen Felix is a 79 y.o. female with a medical diagnosis of COVID-19.  She presents with the following performance deficits affecting function are weakness, impaired endurance, impaired self care skills, impaired functional mobility, gait instability, impaired balance, decreased coordination, decreased lower extremity function, decreased safety awareness, decreased ROM, impaired coordination, impaired cognition, impaired cardiopulmonary response to activity.     Rehab Prognosis:  Fair; patient would benefit from acute skilled OT services to address these deficits and reach maximum level of function.       Plan:     Patient to be seen 2 x/week to address the above listed problems via self-care/home management, therapeutic activities, therapeutic exercises  Plan of Care Expires: 01/17/25  Plan of Care Reviewed with: patient    Subjective     Chief Complaint: "I doing alright"   Patient/Family Comments/goals: Get out of the hospital  Pain/Comfort:  Pain Rating 1: 0/10    Objective:     Communicated with: nurse and EPIC prior to session.  Patient found HOB elevated with peripheral IV, PureWick, pulse ox (continuous), oxygen upon OT entry to room.    General Precautions: Standard, airborne, droplet, contact    Orthopedic Precautions:N/A  Braces: N/A  Respiratory Status: Nasal cannula, flow 3 L/min     Occupational Performance:     Bed Mobility:    Patient completed Rolling/Turning to Right with minimum assistance  Patient completed Scooting/Bridging with contact guard assistance  Patient completed Supine to Sit with maximal assistance of 2     Functional " Mobility/Transfers:  Patient completed Sit <> Stand Transfer with maximal assistance of 2  with  rolling walker   Functional Mobility: Pt completed stand pivot transfer to chair mod A of 2 w/ RW  Required max v/c for safety and correct movement  Pt displayed bilateral knee flexion with RW    AMPAC 6 Click ADL: 17    Treatment & Education:  OT role, plan of care, progression of goals, importance of continued OOB activity, ADL/functional transfer and mobility retraining, call don't fall, safety precautions, fall prevention.  Encouraged completion of B UE AROM therex throughout the day to increase functional strength and activity tolerance needed for ADL completion. Pt completed following therex 1x10 to promote increase in BM and FM independence:   Shoulder flexion   Shoulder touches   Ankle pumps   Sitting marching    Leg lifts    Patient left up in chair with all lines intact, call button in reach, and chair alarm on    GOALS:   Multidisciplinary Problems       Occupational Therapy Goals          Problem: Occupational Therapy    Goal Priority Disciplines Outcome Interventions   Occupational Therapy Goal     OT, PT/OT Progressing    Description: O.T GOALS MET BY 1-17-25  PT WILL TOLERATE  1 SET X 12 REPS B UE ROM EXERCISE  S WITH UE DRESSING  S WITH LE DRESSING  S WITH TOILET TRANSFERS                         Time Tracking:     OT Date of Treatment: 01/09/25  OT Start Time: 0745  OT Stop Time: 0810  OT Total Time (min): 25 min    Billable Minutes:Therapeutic Activity 15  Therapeutic Exercise 10    OT/CORINA: CORINA     Number of CORINA visits since last OT visit: 1  A client care conference was performed between the HUMZA and HOANG, prior to treatment by BOLTON, to discuss the patient's status, treatment plan and established goals.  HOANG Tovar   1/9/2025

## 2025-01-09 NOTE — SUBJECTIVE & OBJECTIVE
Interval History: Planning for home palliative care vs hospice. Patient and family no longer want to d/c to SNF.     Review of Systems   Constitutional:  Positive for activity change, appetite change and fatigue.   HENT:  Positive for congestion.    Respiratory:  Positive for cough (productive), chest tightness, shortness of breath, wheezing and stridor.    Cardiovascular:  Negative for chest pain.   Gastrointestinal:  Positive for abdominal distention and abdominal pain.   Musculoskeletal:  Positive for arthralgias.   Neurological:  Positive for weakness and headaches.   All other systems reviewed and are negative.    Objective:     Vital Signs (Most Recent):  Temp: 97.6 °F (36.4 °C) (01/09/25 0616)  Pulse: 67 (01/09/25 0821)  Resp: 17 (01/09/25 0937)  BP: 136/60 (01/09/25 0937)  SpO2: 98 % (01/09/25 0929) Vital Signs (24h Range):  Temp:  [97.6 °F (36.4 °C)-98.7 °F (37.1 °C)] 97.6 °F (36.4 °C)  Pulse:  [20-83] 67  Resp:  [16-22] 17  SpO2:  [96 %-100 %] 98 %  BP: ()/(50-73) 136/60     Weight: 51.1 kg (112 lb 10.5 oz)  Body mass index is 19.34 kg/m².    Intake/Output Summary (Last 24 hours) at 1/9/2025 0958  Last data filed at 1/8/2025 1700  Gross per 24 hour   Intake 480 ml   Output --   Net 480 ml         Physical Exam  Vitals and nursing note reviewed.   Constitutional:       General: She is not in acute distress.     Appearance: She is underweight. She is ill-appearing.   HENT:      Head: Normocephalic and atraumatic.      Right Ear: Hearing and external ear normal.      Left Ear: Hearing and external ear normal.      Nose: No rhinorrhea.      Right Sinus: No maxillary sinus tenderness or frontal sinus tenderness.      Left Sinus: No maxillary sinus tenderness or frontal sinus tenderness.      Mouth/Throat:      Mouth: No oral lesions.      Pharynx: Uvula midline.   Eyes:      General:         Right eye: No discharge.         Left eye: No discharge.      Conjunctiva/sclera: Conjunctivae normal.      Pupils:  Pupils are equal, round, and reactive to light.   Neck:      Thyroid: No thyromegaly.      Vascular: No carotid bruit.      Trachea: No tracheal deviation.   Cardiovascular:      Rate and Rhythm: Normal rate and regular rhythm.      Pulses:           Dorsalis pedis pulses are 1+ on the right side and 1+ on the left side.      Heart sounds: Normal heart sounds, S1 normal and S2 normal. No murmur heard.  Pulmonary:      Effort: Tachypnea present. No respiratory distress.      Breath sounds: Examination of the right-upper field reveals rhonchi. Examination of the left-upper field reveals rhonchi. Examination of the right-lower field reveals decreased breath sounds. Examination of the left-lower field reveals decreased breath sounds. Decreased breath sounds and rhonchi present.   Abdominal:      General: Bowel sounds are normal. There is distension.      Palpations: Abdomen is soft. There is no mass.      Tenderness: There is abdominal tenderness in the suprapubic area and left lower quadrant.   Musculoskeletal:         General: Normal range of motion.      Cervical back: Normal range of motion.   Lymphadenopathy:      Cervical: No cervical adenopathy.      Upper Body:      Right upper body: No supraclavicular adenopathy.      Left upper body: No supraclavicular adenopathy.   Skin:     General: Skin is warm and dry.      Capillary Refill: Capillary refill takes less than 2 seconds.      Coloration: Skin is pale.      Findings: No rash.   Neurological:      Mental Status: She is oriented to person, place, and time. She is lethargic.   Psychiatric:         Mood and Affect: Mood is anxious. Mood is not depressed. Affect is labile and tearful.         Speech: Speech is rapid and pressured.         Behavior: Behavior is hyperactive.         Judgment: Judgment is impulsive.             Significant Labs: All pertinent labs within the past 24 hours have been reviewed.  CBC:   Recent Labs   Lab 01/08/25  0604 01/09/25  5626    WBC 13.42* 11.97   HGB 12.9 11.8*   HCT 42.3 39.5    160     CMP:   Recent Labs   Lab 01/08/25  0604 01/09/25  0508    136   K 4.3 4.9    105   CO2 21* 23   GLU 90 90   BUN 39* 40*   CREATININE 1.1 0.9   CALCIUM 9.0 9.2   PROT 5.7* 5.6*   ALBUMIN 2.7* 2.7*   BILITOT 0.4 0.3   ALKPHOS 52 54   AST 20 19   ALT 24 21   ANIONGAP 11 8       Significant Imaging: I have reviewed all pertinent imaging results/findings within the past 24 hours.

## 2025-01-09 NOTE — ASSESSMENT & PLAN NOTE
Patient's most recent potassium results are listed below.   Recent Labs     01/07/25  0544 01/08/25  0604 01/09/25  0508   K 4.3 4.3 4.9       Plan  - Replete potassium per protocol  - Monitor potassium Daily  - Patient's hypokalemia is  being treated. Received KCL 40 mEq PO x 1 in ED  -  Stable

## 2025-01-09 NOTE — PLAN OF CARE
Spoke with patient regarding hospice consult.  She would like an informational visit.  Patient also stated that she did not want to go to SNF and wanted to discharge home. Patient asked many questions about what services home health provided.  Discussed what is considered skilled services.  Patient is wanting jail services ie:  caregiver to clean home, help with meals etc.  Advised those are not covered by home health or hospice.     Spoke to daughter, Ariana.  Advised patient is declining SNF and wants to discharge home.  Impressed upon her that she would not be safe to stay at home alone.  Discussed patient is interested in getting information about hospice.  Ariana stated she and her sister would not be available to meet with hospice until Friday.

## 2025-01-09 NOTE — ASSESSMENT & PLAN NOTE
Hx of seizures, patient has not had a seizure during this admission.     -Continue Keppra  -Seizure precautions

## 2025-01-10 LAB
ALBUMIN SERPL BCP-MCNC: 2.9 G/DL (ref 3.5–5.2)
ALP SERPL-CCNC: 54 U/L (ref 40–150)
ALT SERPL W/O P-5'-P-CCNC: 22 U/L (ref 10–44)
ANION GAP SERPL CALC-SCNC: 12 MMOL/L (ref 8–16)
AST SERPL-CCNC: 18 U/L (ref 10–40)
BASOPHILS # BLD AUTO: 0.03 K/UL (ref 0–0.2)
BASOPHILS NFR BLD: 0.3 % (ref 0–1.9)
BILIRUB SERPL-MCNC: 0.5 MG/DL (ref 0.1–1)
BUN SERPL-MCNC: 32 MG/DL (ref 8–23)
CALCIUM SERPL-MCNC: 9.1 MG/DL (ref 8.7–10.5)
CHLORIDE SERPL-SCNC: 104 MMOL/L (ref 95–110)
CO2 SERPL-SCNC: 18 MMOL/L (ref 23–29)
CREAT SERPL-MCNC: 0.9 MG/DL (ref 0.5–1.4)
DIFFERENTIAL METHOD BLD: ABNORMAL
EOSINOPHIL # BLD AUTO: 0.1 K/UL (ref 0–0.5)
EOSINOPHIL NFR BLD: 0.6 % (ref 0–8)
ERYTHROCYTE [DISTWIDTH] IN BLOOD BY AUTOMATED COUNT: 14.7 % (ref 11.5–14.5)
EST. GFR  (NO RACE VARIABLE): >60 ML/MIN/1.73 M^2
GLUCOSE SERPL-MCNC: 76 MG/DL (ref 70–110)
HCT VFR BLD AUTO: 42.4 % (ref 37–48.5)
HGB BLD-MCNC: 12.9 G/DL (ref 12–16)
IMM GRANULOCYTES # BLD AUTO: 0.2 K/UL (ref 0–0.04)
IMM GRANULOCYTES NFR BLD AUTO: 1.9 % (ref 0–0.5)
LYMPHOCYTES # BLD AUTO: 1.7 K/UL (ref 1–4.8)
LYMPHOCYTES NFR BLD: 16 % (ref 18–48)
MAGNESIUM SERPL-MCNC: 1.7 MG/DL (ref 1.6–2.6)
MCH RBC QN AUTO: 30.9 PG (ref 27–31)
MCHC RBC AUTO-ENTMCNC: 30.4 G/DL (ref 32–36)
MCV RBC AUTO: 101 FL (ref 82–98)
MONOCYTES # BLD AUTO: 0.8 K/UL (ref 0.3–1)
MONOCYTES NFR BLD: 7.9 % (ref 4–15)
NEUTROPHILS # BLD AUTO: 7.6 K/UL (ref 1.8–7.7)
NEUTROPHILS NFR BLD: 73.3 % (ref 38–73)
NRBC BLD-RTO: 0 /100 WBC
PHOSPHATE SERPL-MCNC: 3.4 MG/DL (ref 2.7–4.5)
PLATELET # BLD AUTO: 141 K/UL (ref 150–450)
PMV BLD AUTO: 11.2 FL (ref 9.2–12.9)
POTASSIUM SERPL-SCNC: 4.6 MMOL/L (ref 3.5–5.1)
PROT SERPL-MCNC: 6.1 G/DL (ref 6–8.4)
RBC # BLD AUTO: 4.18 M/UL (ref 4–5.4)
SODIUM SERPL-SCNC: 134 MMOL/L (ref 136–145)
WBC # BLD AUTO: 10.42 K/UL (ref 3.9–12.7)

## 2025-01-10 PROCEDURE — 99900035 HC TECH TIME PER 15 MIN (STAT)

## 2025-01-10 PROCEDURE — 80053 COMPREHEN METABOLIC PANEL: CPT | Performed by: STUDENT IN AN ORGANIZED HEALTH CARE EDUCATION/TRAINING PROGRAM

## 2025-01-10 PROCEDURE — 21400001 HC TELEMETRY ROOM

## 2025-01-10 PROCEDURE — 83735 ASSAY OF MAGNESIUM: CPT | Performed by: STUDENT IN AN ORGANIZED HEALTH CARE EDUCATION/TRAINING PROGRAM

## 2025-01-10 PROCEDURE — 27000221 HC OXYGEN, UP TO 24 HOURS

## 2025-01-10 PROCEDURE — 25000242 PHARM REV CODE 250 ALT 637 W/ HCPCS: Performed by: STUDENT IN AN ORGANIZED HEALTH CARE EDUCATION/TRAINING PROGRAM

## 2025-01-10 PROCEDURE — 84100 ASSAY OF PHOSPHORUS: CPT | Performed by: STUDENT IN AN ORGANIZED HEALTH CARE EDUCATION/TRAINING PROGRAM

## 2025-01-10 PROCEDURE — 25000003 PHARM REV CODE 250: Performed by: NURSE PRACTITIONER

## 2025-01-10 PROCEDURE — 63600175 PHARM REV CODE 636 W HCPCS: Performed by: NURSE PRACTITIONER

## 2025-01-10 PROCEDURE — 36415 COLL VENOUS BLD VENIPUNCTURE: CPT | Performed by: STUDENT IN AN ORGANIZED HEALTH CARE EDUCATION/TRAINING PROGRAM

## 2025-01-10 PROCEDURE — 25000003 PHARM REV CODE 250: Performed by: STUDENT IN AN ORGANIZED HEALTH CARE EDUCATION/TRAINING PROGRAM

## 2025-01-10 PROCEDURE — 85025 COMPLETE CBC W/AUTO DIFF WBC: CPT | Performed by: STUDENT IN AN ORGANIZED HEALTH CARE EDUCATION/TRAINING PROGRAM

## 2025-01-10 PROCEDURE — 27000207 HC ISOLATION

## 2025-01-10 RX ORDER — ALPRAZOLAM 0.25 MG/1
0.25 TABLET ORAL 3 TIMES DAILY
Status: DISCONTINUED | OUTPATIENT
Start: 2025-01-10 | End: 2025-01-17

## 2025-01-10 RX ADMIN — DEXAMETHASONE SODIUM PHOSPHATE 6 MG: 4 INJECTION INTRA-ARTICULAR; INTRALESIONAL; INTRAMUSCULAR; INTRAVENOUS; SOFT TISSUE at 09:01

## 2025-01-10 RX ADMIN — ENOXAPARIN SODIUM 50 MG: 60 INJECTION SUBCUTANEOUS at 10:01

## 2025-01-10 RX ADMIN — PANTOPRAZOLE SODIUM 40 MG: 40 TABLET, DELAYED RELEASE ORAL at 09:01

## 2025-01-10 RX ADMIN — OXYCODONE HYDROCHLORIDE AND ACETAMINOPHEN 500 MG: 500 TABLET ORAL at 10:01

## 2025-01-10 RX ADMIN — MUPIROCIN: 20 OINTMENT TOPICAL at 09:01

## 2025-01-10 RX ADMIN — GABAPENTIN 200 MG: 100 CAPSULE ORAL at 10:01

## 2025-01-10 RX ADMIN — SACUBITRIL AND VALSARTAN 1 TABLET: 24; 26 TABLET, FILM COATED ORAL at 09:01

## 2025-01-10 RX ADMIN — BENZONATATE 100 MG: 100 CAPSULE ORAL at 10:01

## 2025-01-10 RX ADMIN — ATORVASTATIN CALCIUM 40 MG: 40 TABLET, FILM COATED ORAL at 09:01

## 2025-01-10 RX ADMIN — METOPROLOL TARTRATE 50 MG: 50 TABLET, FILM COATED ORAL at 09:01

## 2025-01-10 RX ADMIN — CLOPIDOGREL BISULFATE 75 MG: 75 TABLET ORAL at 09:01

## 2025-01-10 RX ADMIN — OXYCODONE HYDROCHLORIDE AND ACETAMINOPHEN 500 MG: 500 TABLET ORAL at 09:01

## 2025-01-10 RX ADMIN — ESCITALOPRAM OXALATE 20 MG: 10 TABLET, FILM COATED ORAL at 09:01

## 2025-01-10 RX ADMIN — MORPHINE SULFATE 2 MG: 2 INJECTION, SOLUTION INTRAMUSCULAR; INTRAVENOUS at 10:01

## 2025-01-10 RX ADMIN — LEVETIRACETAM 500 MG: 500 TABLET, FILM COATED ORAL at 09:01

## 2025-01-10 RX ADMIN — ALPRAZOLAM 0.25 MG: 0.25 TABLET ORAL at 09:01

## 2025-01-10 RX ADMIN — PANTOPRAZOLE SODIUM 40 MG: 40 TABLET, DELAYED RELEASE ORAL at 10:01

## 2025-01-10 RX ADMIN — GUAIFENESIN AND DEXTROMETHORPHAN HYDROBROMIDE 1 TABLET: 600; 30 TABLET, EXTENDED RELEASE ORAL at 10:01

## 2025-01-10 RX ADMIN — GABAPENTIN 200 MG: 100 CAPSULE ORAL at 09:01

## 2025-01-10 RX ADMIN — GABAPENTIN 200 MG: 100 CAPSULE ORAL at 03:01

## 2025-01-10 RX ADMIN — FLUTICASONE PROPIONATE 100 MCG: 50 SPRAY, METERED NASAL at 09:01

## 2025-01-10 RX ADMIN — ALPRAZOLAM 0.25 MG: 0.25 TABLET ORAL at 10:01

## 2025-01-10 RX ADMIN — GUAIFENESIN AND DEXTROMETHORPHAN HYDROBROMIDE 1 TABLET: 600; 30 TABLET, EXTENDED RELEASE ORAL at 09:01

## 2025-01-10 RX ADMIN — THERA TABS 1 TABLET: TAB at 09:01

## 2025-01-10 RX ADMIN — BENZONATATE 100 MG: 100 CAPSULE ORAL at 09:01

## 2025-01-10 RX ADMIN — BENZONATATE 100 MG: 100 CAPSULE ORAL at 03:01

## 2025-01-10 RX ADMIN — LEVETIRACETAM 500 MG: 500 TABLET, FILM COATED ORAL at 10:01

## 2025-01-10 RX ADMIN — MUPIROCIN: 20 OINTMENT TOPICAL at 10:01

## 2025-01-10 RX ADMIN — ENOXAPARIN SODIUM 50 MG: 60 INJECTION SUBCUTANEOUS at 09:01

## 2025-01-10 RX ADMIN — MONTELUKAST 10 MG: 10 TABLET, FILM COATED ORAL at 09:01

## 2025-01-10 RX ADMIN — FUROSEMIDE 20 MG: 20 TABLET ORAL at 09:01

## 2025-01-10 RX ADMIN — POLYETHYLENE GLYCOL 3350 17 G: 17 POWDER, FOR SOLUTION ORAL at 10:01

## 2025-01-10 NOTE — SUBJECTIVE & OBJECTIVE
Interval History:  continues to have productive cough, has difficulty speaking without coughing.  When she does have coughing fits, she does become  More hypoxic.  Reported blood-tinged sputum, epistaxis.  Will monitor hemoglobin, currently stable.    Review of Systems   Constitutional:  Positive for activity change, appetite change and fatigue.   HENT:  Positive for congestion and nosebleeds.    Respiratory:  Positive for cough (productive), chest tightness and shortness of breath. Negative for wheezing and stridor.    Cardiovascular:  Negative for chest pain.   Gastrointestinal:  Positive for abdominal distention and abdominal pain.   Musculoskeletal:  Positive for arthralgias.   Neurological:  Positive for weakness and headaches.   All other systems reviewed and are negative.    Objective:     Vital Signs (Most Recent):  Temp: 97.4 °F (36.3 °C) (01/10/25 0742)  Pulse: 73 (01/10/25 1136)  Resp: 18 (01/10/25 1136)  BP: (!) 124/55 (01/10/25 1136)  SpO2: 98 % (01/10/25 1136) Vital Signs (24h Range):  Temp:  [97.4 °F (36.3 °C)-98.9 °F (37.2 °C)] 97.4 °F (36.3 °C)  Pulse:  [54-73] 73  Resp:  [16-24] 18  SpO2:  [98 %-100 %] 98 %  BP: (108-162)/(53-70) 124/55     Weight: 51.1 kg (112 lb 10.5 oz)  Body mass index is 19.34 kg/m².    Intake/Output Summary (Last 24 hours) at 1/10/2025 1149  Last data filed at 1/10/2025 0800  Gross per 24 hour   Intake 724 ml   Output 1650 ml   Net -926 ml         Physical Exam  Vitals and nursing note reviewed.   Constitutional:       General: She is not in acute distress.     Appearance: She is underweight. She is ill-appearing.   HENT:      Head: Normocephalic and atraumatic.      Right Ear: Hearing and external ear normal.      Left Ear: Hearing and external ear normal.      Nose: No rhinorrhea.      Right Sinus: No maxillary sinus tenderness or frontal sinus tenderness.      Left Sinus: No maxillary sinus tenderness or frontal sinus tenderness.      Mouth/Throat:      Mouth: No oral  lesions.      Pharynx: Uvula midline.   Eyes:      General:         Right eye: No discharge.         Left eye: No discharge.      Conjunctiva/sclera: Conjunctivae normal.      Pupils: Pupils are equal, round, and reactive to light.   Neck:      Thyroid: No thyromegaly.      Vascular: No carotid bruit.      Trachea: No tracheal deviation.   Cardiovascular:      Rate and Rhythm: Normal rate and regular rhythm.      Pulses:           Dorsalis pedis pulses are 1+ on the right side and 1+ on the left side.      Heart sounds: Normal heart sounds, S1 normal and S2 normal. No murmur heard.  Pulmonary:      Effort: Tachypnea present. No respiratory distress.      Breath sounds: Examination of the right-upper field reveals rhonchi. Examination of the left-upper field reveals rhonchi. Examination of the right-lower field reveals decreased breath sounds. Examination of the left-lower field reveals decreased breath sounds. Decreased breath sounds and rhonchi present.   Abdominal:      General: Bowel sounds are normal. There is distension.      Palpations: Abdomen is soft. There is no mass.      Tenderness: There is abdominal tenderness in the suprapubic area and left lower quadrant.   Musculoskeletal:         General: Normal range of motion.      Cervical back: Normal range of motion.   Lymphadenopathy:      Cervical: No cervical adenopathy.      Upper Body:      Right upper body: No supraclavicular adenopathy.      Left upper body: No supraclavicular adenopathy.   Skin:     General: Skin is warm and dry.      Capillary Refill: Capillary refill takes less than 2 seconds.      Coloration: Skin is pale.      Findings: No rash.   Neurological:      Mental Status: She is oriented to person, place, and time. She is lethargic.   Psychiatric:         Mood and Affect: Mood is anxious. Mood is not depressed. Affect is labile and tearful.         Speech: Speech is not rapid and pressured.         Behavior: Behavior is not hyperactive.          Judgment: Judgment is not impulsive.             Significant Labs: All pertinent labs within the past 24 hours have been reviewed.  CBC:   Recent Labs   Lab 01/09/25  0508 01/10/25  0531   WBC 11.97 10.42   HGB 11.8* 12.9   HCT 39.5 42.4    141*     CMP:   Recent Labs   Lab 01/09/25  0508 01/10/25  0531    134*   K 4.9 4.6    104   CO2 23 18*   GLU 90 76   BUN 40* 32*   CREATININE 0.9 0.9   CALCIUM 9.2 9.1   PROT 5.6* 6.1   ALBUMIN 2.7* 2.9*   BILITOT 0.3 0.5   ALKPHOS 54 54   AST 19 18   ALT 21 22   ANIONGAP 8 12       Significant Imaging: I have reviewed all pertinent imaging results/findings within the past 24 hours.

## 2025-01-10 NOTE — PLAN OF CARE
01/10/25 1430   Rounds   Attendance Provider;;Charge nurse;Physical therapist   Discharge Plan A Other  (Pending Progression)   Why the patient remains in the hospital Requires continued medical care   Transition of Care Barriers None       Continuing to wean Patient's O2.  Patient had meeting with [facility].  Patient now declining SNF Placement.     Disposition pending.

## 2025-01-10 NOTE — PLAN OF CARE
POC reviewed with pt. Pt verbalizes understanding of POC. No questions at this time.  AAOx4. NADN. 2L nasal cannula. Monitor resp status prn xanax and morphine if needed. Plan: hospice maybe   NSR on cardiac monitor.  Pt remains free of falls.  No complaints at this time.  Safety measures in place. Will continue to monitor.  Informed pt to call for assistance before getting up. Pt verbalizes understanding.  Hourly rounding and chart check complete.

## 2025-01-10 NOTE — ASSESSMENT & PLAN NOTE
Pulm fibrosis hx- followed by pulmonology outpatient  -Will need close pulm f/u after discharge  -  Patient and family considering admission to hospice,  family able to meet on Monday

## 2025-01-10 NOTE — PROGRESS NOTES
[facility]  Progress Note    Patient Name: Shireen Felix  MRN: [id number]  Patient Class: IP- Inpatient   Admission Date: 12/31/2024  Length of Stay: 9 days  Attending Physician: Everardo Graf MD  Primary Care Provider: Laron Chaudhari MD        Subjective     Principal Problem:COVID-19        HPI:  Shireen Felix is a 79 year old female who has  has a past medical history of Abnormal ECG, Acute bronchitis and bronchiolitis, Aneurysm, Anticoagulant long-term use, Arthritis, CAD in native artery, COPD (chronic obstructive pulmonary disease), Diabetes mellitus, Fall, Glaucoma, Hypertension, Seizures, Shingles, and Stroke who presented to Emergency Department for evaluation for cough. Associated symptoms include: congestion, wheezing, and SOB. She was seen by her pulmonologist today, Dr. Sam. Started as sinus pain and drainage and now has progressed to respiratory symptoms. She was seen a few days ago in the ER given prescription for antibiotics steroids but these have not helped much. Today in the office she can barely talk without coughing continually. She is having a difficult time putting more than 3 words together without severe cough paroxysms. ED workup showed Hgb/Hct 11.9/37.0, K+ 3.1, CO2 16, BUN/creatinine 34/1.5. CT chest w/out contrast stable suspected pulmonary fibrosis of the lungs. No acute infiltrates. She has received DuoNeb x 1, solumedrol 125 mg IV x 1, KCL 40 mEq PO x 1 and NS 1L bolus. Pt admitted for COVID.     Overview/Hospital Course:  Admitted to Hospital Medicine for acute on chronic respiratory failure concerns in setting of COVID-19.  Started on IV methylprednisolone, remdesivir, scheduled respiratory treatments. 01/04: Reported left lower quadrant abdominal pain with tenderness to palpation, will evaluate via CT as patient is on therapeutic anticoagulation for COVID-19.  CT abdomen/pelvis nonacute. 01/05:  Reported improvements  in respiratory status with decreased frequency of cough and dyspnea but still reporting significant symptoms from baseline.  On 1/6, patient had a significant event with acute tachypnea, tachycardia. Pulmonology consulted, orders adjusted. Patient stable overnight, had another event on 1/7 similar to previous day. Possible exacerbation related to anxiety provoked by medications and congestion. Case reviewed, orders adjusted. Per Pulmonology, due to underlying lung fibrosis, patient is likely not benefiting from bronchodilators. Stopped on 1/7, patient did not experience any further episodes of anxiety, respiratory distress. Will continue xanax and prn morphine. Difficulty with discharge plan, patient originally planned to d/c to SNF, now prefers to d/c home.  Pulmonology discussed home palliative care vs hospice.  Patient and family wish to discuss the Services further.  Per discussion with patient, family will be available for an informational visit on Monday.  Educated patient on results of oxygen evaluation, advised patient to reduce oxygen to 2 L or less at rest, requires 4 L with exertion.  As clinically indicated if significant improvement noted will repeat oxygen evaluation.     Interval History:  continues to have productive cough, has difficulty speaking without coughing.  When she does have coughing fits, she does become  More hypoxic.  Reported blood-tinged sputum, epistaxis.  Will monitor hemoglobin, currently stable.    Review of Systems   Constitutional:  Positive for activity change, appetite change and fatigue.   HENT:  Positive for congestion and nosebleeds.    Respiratory:  Positive for cough (productive), chest tightness and shortness of breath. Negative for wheezing and stridor.    Cardiovascular:  Negative for chest pain.   Gastrointestinal:  Positive for abdominal distention and abdominal pain.   Musculoskeletal:  Positive for arthralgias.   Neurological:  Positive for weakness and headaches.    All other systems reviewed and are negative.    Objective:     Vital Signs (Most Recent):  Temp: 97.4 °F (36.3 °C) (01/10/25 0742)  Pulse: 73 (01/10/25 1136)  Resp: 18 (01/10/25 1136)  BP: (!) 124/55 (01/10/25 1136)  SpO2: 98 % (01/10/25 1136) Vital Signs (24h Range):  Temp:  [97.4 °F (36.3 °C)-98.9 °F (37.2 °C)] 97.4 °F (36.3 °C)  Pulse:  [54-73] 73  Resp:  [16-24] 18  SpO2:  [98 %-100 %] 98 %  BP: (108-162)/(53-70) 124/55     Weight: 51.1 kg (112 lb 10.5 oz)  Body mass index is 19.34 kg/m².    Intake/Output Summary (Last 24 hours) at 1/10/2025 1149  Last data filed at 1/10/2025 0800  Gross per 24 hour   Intake 724 ml   Output 1650 ml   Net -926 ml         Physical Exam  Vitals and nursing note reviewed.   Constitutional:       General: She is not in acute distress.     Appearance: She is underweight. She is ill-appearing.   HENT:      Head: Normocephalic and atraumatic.      Right Ear: Hearing and external ear normal.      Left Ear: Hearing and external ear normal.      Nose: No rhinorrhea.      Right Sinus: No maxillary sinus tenderness or frontal sinus tenderness.      Left Sinus: No maxillary sinus tenderness or frontal sinus tenderness.      Mouth/Throat:      Mouth: No oral lesions.      Pharynx: Uvula midline.   Eyes:      General:         Right eye: No discharge.         Left eye: No discharge.      Conjunctiva/sclera: Conjunctivae normal.      Pupils: Pupils are equal, round, and reactive to light.   Neck:      Thyroid: No thyromegaly.      Vascular: No carotid bruit.      Trachea: No tracheal deviation.   Cardiovascular:      Rate and Rhythm: Normal rate and regular rhythm.      Pulses:           Dorsalis pedis pulses are 1+ on the right side and 1+ on the left side.      Heart sounds: Normal heart sounds, S1 normal and S2 normal. No murmur heard.  Pulmonary:      Effort: Tachypnea present. No respiratory distress.      Breath sounds: Examination of the right-upper field reveals rhonchi. Examination of  the left-upper field reveals rhonchi. Examination of the right-lower field reveals decreased breath sounds. Examination of the left-lower field reveals decreased breath sounds. Decreased breath sounds and rhonchi present.   Abdominal:      General: Bowel sounds are normal. There is distension.      Palpations: Abdomen is soft. There is no mass.      Tenderness: There is abdominal tenderness in the suprapubic area and left lower quadrant.   Musculoskeletal:         General: Normal range of motion.      Cervical back: Normal range of motion.   Lymphadenopathy:      Cervical: No cervical adenopathy.      Upper Body:      Right upper body: No supraclavicular adenopathy.      Left upper body: No supraclavicular adenopathy.   Skin:     General: Skin is warm and dry.      Capillary Refill: Capillary refill takes less than 2 seconds.      Coloration: Skin is pale.      Findings: No rash.   Neurological:      Mental Status: She is oriented to person, place, and time. She is lethargic.   Psychiatric:         Mood and Affect: Mood is anxious. Mood is not depressed. Affect is labile and tearful.         Speech: Speech is not rapid and pressured.         Behavior: Behavior is not hyperactive.         Judgment: Judgment is not impulsive.             Significant Labs: All pertinent labs within the past 24 hours have been reviewed.  CBC:   Recent Labs   Lab 01/09/25  0508 01/10/25  0531   WBC 11.97 10.42   HGB 11.8* 12.9   HCT 39.5 42.4    141*     CMP:   Recent Labs   Lab 01/09/25  0508 01/10/25  0531    134*   K 4.9 4.6    104   CO2 23 18*   GLU 90 76   BUN 40* 32*   CREATININE 0.9 0.9   CALCIUM 9.2 9.1   PROT 5.6* 6.1   ALBUMIN 2.7* 2.9*   BILITOT 0.3 0.5   ALKPHOS 54 54   AST 19 18   ALT 21 22   ANIONGAP 8 12       Significant Imaging: I have reviewed all pertinent imaging results/findings within the past 24 hours.    Assessment and Plan     * COVID-19  Patient is identified as Severe COVID-19 based on  hypoxemia with O2 saturations <94% on room air or on ambulation   Initiate standard COVID protocols; COVID-19 testing ,Infection Control notification  and isolation- respiratory, contact and droplet per protocol    Diagnostics: CBC, CMP, Ferritin, CRP, Troponin, and Portable CXR    Management: Initiate targeted therapy with Remdesivir, 200mg IV x1, followed by 100mg IV daily x5 days total and Anticoagulation, Patient admitted to non-critical care unit- Will initiate full dose anticoagulation with Weight based lovenox 1mg/kg IV q12, Maintain oxygen saturations 92-96% via Nasal Cannula  LPM and monitor with continuous/intermittent pulse oximetry. , Inhaled bronchodilators as needed for shortness of breath., and Continuous cardiac monitoring.    Advance Care Planning Current advance care plan has been discussed with patient/family/POA and patient currently wishes Full Code.     Acute on chronic respiratory failure with hypoxia  Patient with Hypoxic Respiratory failure which is Acute on chronic.  she is not on home oxygen. Supplemental oxygen was provided and noted- Oxygen Concentration (%):  [32] 32    .   Signs/symptoms of respiratory failure include- tachypnea, increased work of breathing, respiratory distress, use of accessory muscles, wheezing, stridor, and lethargy. Contributing diagnoses includes - Interstitial lung disease Labs and images were reviewed. Patient Has recent ABG, which has been reviewed. Will treat underlying causes and adjust management of respiratory failure as follows-     Home Oxygen evaluation performed:  Home Oxygen Evaluation - [facility] - Cardiopulmonary Department      Date Performed: 1/8/2025     1)Patient's Home O2 Sat on room air, while at rest: Room Air SpO2 At Rest: 99 %                             If O2 sats on room air at rest are 88% or below, patient qualifies.  Document O2 liter flow needed in Step 2.  If O2 sats are 89% or above, complete Step 3.        2)         If  patient is not ambulated and O2 sats are <88%, what is the O2 liter flow required to meet ordered saturation? Home O2 Eval Comments: pt qualifies for home oxygen     If O2 sats on room air while exercising remain 89% or above patient does not qualify, no further testing needed Document N/A in step 3. If O2 sats on room air while exercising are 88% or below, continue to Step 4.     3)Patient's O2 Sat on room air while exercising: Room Air SpO2 During Ambulation: (!) 78 %                             4)Patient's O2 Sat while exercising on O2: SpO2 During Ambulation on O2: 96 % at Ambulation O2 LPM: 4 LPM                             (Must show improvement from #4 for patients to qualify)    Patient qualifies for Home O2 @4L NC due to exertional dyspnea, hypoxia    Hypokalemia  Patient's most recent potassium results are listed below.   Recent Labs     01/08/25  0604 01/09/25  0508 01/10/25  0531   K 4.3 4.9 4.6       Plan  - Replete potassium per protocol  - Monitor potassium Daily  - Patient's hypokalemia is  being treated. Received KCL 40 mEq PO x 1 in ED  -  Stable    CKD (chronic kidney disease)  Creatine stable for now. BMP reviewed- noted Estimated Creatinine Clearance: 40.2 mL/min (based on SCr of 0.9 mg/dL). according to latest data. Based on current GFR, CKD stage is stage 4 - GFR 15-29.  Monitor UOP and serial BMP and adjust therapy as needed. Renally dose meds. Avoid nephrotoxic medications and procedures.  -Creatinine 1.5 on admission however 3 months ago was 1.2    Recent Labs     01/08/25  0604 01/09/25  0508 01/10/25  0531   CREATININE 1.1 0.9 0.9         -Due to GFR, will need to dc ranexa for now and decrease dose of gabapentin  -Hold spironolactone   -Continue Entresto   -Monitor creatinine  -stable    Chronic systolic heart failure  Patient has Diastolic (HFpEF) heart failure that is Chronic. On presentation their CHF was well compensated. Most recent BNP and echo results are listed below.  Recent  Labs     01/07/25  0918   *       Latest ECHO  Results for orders placed during the hospital encounter of 06/02/23    Echo    Interpretation Summary  · The left ventricle is normal in size with concentric hypertrophy and low normal systolic function.  · The estimated ejection fraction is 50%.  · Normal left ventricular diastolic function.  · There is abnormal septal wall motion consistent with left bundle branch block.  · Normal right ventricular size with normal right ventricular systolic function.  · Normal central venous pressure (3 mmHg).  · The estimated PA systolic pressure is 30 mmHg.  · Mild mitral regurgitation.    Current Heart Failure Medications  sacubitriL-valsartan 24-26 mg per tablet 1 tablet, 2 times daily, Oral  furosemide tablet 20 mg, Daily, Oral    Plan  - Monitor strict I&Os and daily weights.    - Place on telemetry  - Low sodium diet  - Place on fluid restriction of 1.5 L.   - Cardiology has not been consulted  - The patient's volume status is at their baseline  - Repeat ECHO, EF improved  -stop IV Lasix, resume home dose p.o. Lasix           Mixed hyperlipidemia  Lab Results   Component Value Date    CHOL 112 (L) 07/12/2024    HDL 41 07/12/2024    LDLCALC 49.8 (L) 07/12/2024    TRIG 106 07/12/2024     -Continue Statin    Normocytic anemia  Anemia is likely due to chronic disease due to Chronic Kidney Disease. Most recent hemoglobin and hematocrit are listed below.  Recent Labs     01/08/25  0604 01/09/25  0508 01/10/25  0531   HGB 12.9 11.8* 12.9   HCT 42.3 39.5 42.4       Plan  - Monitor serial CBC: Daily  - Transfuse PRBC if patient becomes hemodynamically unstable, symptomatic or H/H drops below 7/21.  - Patient has not received any PRBC transfusions to date  - Patient's anemia is currently stable    Seizure  Hx of seizures, patient has not had a seizure during this admission.     -Continue Keppra  -Seizure precautions      Coronary artery disease of native artery of native heart  with stable angina pectoris  Patient with known CAD, which is controlled Will continue Plavix and Statin and monitor for S/Sx of angina/ACS. Continue to monitor on telemetry.     UIP (usual interstitial pneumonitis)  Pulm fibrosis hx- followed by pulmonology outpatient  -Will need close pulm f/u after discharge  -  Patient and family considering admission to hospice,  family able to meet on Monday        GERD (gastroesophageal reflux disease)  -Continue PPI        VTE Risk Mitigation (From admission, onward)           Ordered     enoxaparin injection 50 mg  2 times daily         12/31/24 1518                    Discharge Planning   KATHERINE:      Code Status: Full Code   Medical Readiness for Discharge Date:   Discharge Plan A: Skilled Nursing Facility   Discharge Delays: None known at this time            Please place Justification for DME        Emi Jenkins NP  Department of Hospital Medicine   [facility] - Telemetry ([facility])

## 2025-01-10 NOTE — PLAN OF CARE
A223/A223 OLU Felix is a 79 y.o.female admitted on 12/31/2024 for COVID-19   Code Status: Full Code MRN: [id number]   Review of patient's allergies indicates:   Allergen Reactions    Penicillins Anaphylaxis, Hives, Shortness Of Breath and Swelling    Penicillin     Adhesive Rash    Doxycycline Nausea Only    Oxycodone Other (See Comments)     Fatigue      Sulfa (sulfonamide antibiotics) Itching     Past Medical History:   Diagnosis Date    Abnormal ECG 08/05/2019    Acute bronchitis and bronchiolitis 12/31/2024    Aneurysm     Anticoagulant long-term use     Arthritis     CAD in native artery 08/05/2019    Diabetes mellitus     Fall 10/10/2019    Formatting of this note might be different from the original. Found sitting on floor next to bed last night Mild confusion today Does not recall falling or how she ended up on floor UA today Labs yesterday unremarkable    Glaucoma     Hypertension     Pulmonary fibrosis     Seizures     Shingles 05/27/2017    Stroke       PRN meds    acetaminophen, 650 mg, Q6H PRN  albuterol-ipratropium, 3 mL, Q4H PRN  ALPRAZolam, 0.25 mg, TID PRN  dextrose 50%, 12.5 g, PRN  dextrose 50%, 25 g, PRN  glucagon (human recombinant), 1 mg, PRN  glucose, 16 g, PRN  glucose, 24 g, PRN  HYDROcodone-acetaminophen, 1 tablet, Q6H PRN  melatonin, 6 mg, Nightly PRN  methocarbamoL, 500 mg, TID PRN  morphine, 2 mg, Q4H PRN  ondansetron, 4 mg, Q8H PRN  sodium chloride 0.9%, 10 mL, PRN      Chart check completed. Will continue plan of care.         [place] Coma Scale Score: 15     Lead Monitored: Lead II Rhythm: normal sinus rhythm Frequency/Ectopy: PVCs  Cardiac/Telemetry Box Number: [id number]  VTE Core Measure: Pharmacological prophylaxis initiated/maintained Last Bowel Movement: 01/07/25  Diet Cardiac Standard Tray  Voiding Characteristics: external catheter  Gilberto Score: 12  Fall Risk Score: 19  Accucheck []   Freq?      Lines/Drains/Airways       Drain  Duration             Female External  Urinary Catheter w/ Suction 12/31/24 2230 8 days              Peripheral Intravenous Line  Duration                  Peripheral IV - Single Lumen 01/04/25 1317 22 G Left;Posterior;Proximal Forearm 5 days

## 2025-01-10 NOTE — ASSESSMENT & PLAN NOTE
Creatine stable for now. BMP reviewed- noted Estimated Creatinine Clearance: 40.2 mL/min (based on SCr of 0.9 mg/dL). according to latest data. Based on current GFR, CKD stage is stage 4 - GFR 15-29.  Monitor UOP and serial BMP and adjust therapy as needed. Renally dose meds. Avoid nephrotoxic medications and procedures.  -Creatinine 1.5 on admission however 3 months ago was 1.2    Recent Labs     01/08/25  0604 01/09/25  0508 01/10/25  0531   CREATININE 1.1 0.9 0.9         -Due to GFR, will need to dc ranexa for now and decrease dose of gabapentin  -Hold spironolactone   -Continue Entresto   -Monitor creatinine  -stable

## 2025-01-10 NOTE — PT/OT/SLP PROGRESS
Physical Therapy      Patient Name:  Shireen Felix   MRN:  [id number]    10:35 a.m.  Patient found in room coughing up blood and with nosebleed. Patient unable to hold conversation without coughing and O2 desaturation.   Nurse notified and aware of patient presentations.   Will follow up at next available opportunity.

## 2025-01-10 NOTE — ASSESSMENT & PLAN NOTE
Patient's most recent potassium results are listed below.   Recent Labs     01/08/25  0604 01/09/25  0508 01/10/25  0531   K 4.3 4.9 4.6       Plan  - Replete potassium per protocol  - Monitor potassium Daily  - Patient's hypokalemia is  being treated. Received KCL 40 mEq PO x 1 in ED  -  Stable

## 2025-01-10 NOTE — ASSESSMENT & PLAN NOTE
Anemia is likely due to chronic disease due to Chronic Kidney Disease. Most recent hemoglobin and hematocrit are listed below.  Recent Labs     01/08/25  0604 01/09/25  0508 01/10/25  0531   HGB 12.9 11.8* 12.9   HCT 42.3 39.5 42.4       Plan  - Monitor serial CBC: Daily  - Transfuse PRBC if patient becomes hemodynamically unstable, symptomatic or H/H drops below 7/21.  - Patient has not received any PRBC transfusions to date  - Patient's anemia is currently stable

## 2025-01-11 PROBLEM — R04.0 EPISTAXIS: Status: ACTIVE | Noted: 2025-01-11

## 2025-01-11 LAB
ALBUMIN SERPL BCP-MCNC: 2.6 G/DL (ref 3.5–5.2)
ALP SERPL-CCNC: 52 U/L (ref 40–150)
ALT SERPL W/O P-5'-P-CCNC: 20 U/L (ref 10–44)
ANION GAP SERPL CALC-SCNC: 10 MMOL/L (ref 8–16)
AST SERPL-CCNC: 14 U/L (ref 10–40)
BASOPHILS # BLD AUTO: 0.02 K/UL (ref 0–0.2)
BASOPHILS NFR BLD: 0.2 % (ref 0–1.9)
BILIRUB SERPL-MCNC: 0.3 MG/DL (ref 0.1–1)
BUN SERPL-MCNC: 63 MG/DL (ref 8–23)
CALCIUM SERPL-MCNC: 9.7 MG/DL (ref 8.7–10.5)
CHLORIDE SERPL-SCNC: 100 MMOL/L (ref 95–110)
CO2 SERPL-SCNC: 24 MMOL/L (ref 23–29)
CREAT SERPL-MCNC: 1 MG/DL (ref 0.5–1.4)
DIFFERENTIAL METHOD BLD: ABNORMAL
EOSINOPHIL # BLD AUTO: 0.1 K/UL (ref 0–0.5)
EOSINOPHIL NFR BLD: 1.1 % (ref 0–8)
ERYTHROCYTE [DISTWIDTH] IN BLOOD BY AUTOMATED COUNT: 14.5 % (ref 11.5–14.5)
EST. GFR  (NO RACE VARIABLE): 57 ML/MIN/1.73 M^2
GLUCOSE SERPL-MCNC: 99 MG/DL (ref 70–110)
HCT VFR BLD AUTO: 38.8 % (ref 37–48.5)
HGB BLD-MCNC: 12.1 G/DL (ref 12–16)
IMM GRANULOCYTES # BLD AUTO: 0.23 K/UL (ref 0–0.04)
IMM GRANULOCYTES NFR BLD AUTO: 2.1 % (ref 0–0.5)
LYMPHOCYTES # BLD AUTO: 2.6 K/UL (ref 1–4.8)
LYMPHOCYTES NFR BLD: 23.6 % (ref 18–48)
MAGNESIUM SERPL-MCNC: 1.6 MG/DL (ref 1.6–2.6)
MCH RBC QN AUTO: 30.9 PG (ref 27–31)
MCHC RBC AUTO-ENTMCNC: 31.2 G/DL (ref 32–36)
MCV RBC AUTO: 99 FL (ref 82–98)
MONOCYTES # BLD AUTO: 1.1 K/UL (ref 0.3–1)
MONOCYTES NFR BLD: 9.8 % (ref 4–15)
NEUTROPHILS # BLD AUTO: 6.9 K/UL (ref 1.8–7.7)
NEUTROPHILS NFR BLD: 63.2 % (ref 38–73)
NRBC BLD-RTO: 0 /100 WBC
PHOSPHATE SERPL-MCNC: 3.1 MG/DL (ref 2.7–4.5)
PLATELET # BLD AUTO: 152 K/UL (ref 150–450)
PMV BLD AUTO: 11.1 FL (ref 9.2–12.9)
POTASSIUM SERPL-SCNC: 4.4 MMOL/L (ref 3.5–5.1)
PROT SERPL-MCNC: 5.6 G/DL (ref 6–8.4)
RBC # BLD AUTO: 3.92 M/UL (ref 4–5.4)
SODIUM SERPL-SCNC: 134 MMOL/L (ref 136–145)
WBC # BLD AUTO: 10.96 K/UL (ref 3.9–12.7)

## 2025-01-11 PROCEDURE — 21400001 HC TELEMETRY ROOM

## 2025-01-11 PROCEDURE — 25000003 PHARM REV CODE 250: Performed by: STUDENT IN AN ORGANIZED HEALTH CARE EDUCATION/TRAINING PROGRAM

## 2025-01-11 PROCEDURE — 97530 THERAPEUTIC ACTIVITIES: CPT | Mod: CQ

## 2025-01-11 PROCEDURE — 85025 COMPLETE CBC W/AUTO DIFF WBC: CPT | Performed by: STUDENT IN AN ORGANIZED HEALTH CARE EDUCATION/TRAINING PROGRAM

## 2025-01-11 PROCEDURE — 36415 COLL VENOUS BLD VENIPUNCTURE: CPT | Performed by: STUDENT IN AN ORGANIZED HEALTH CARE EDUCATION/TRAINING PROGRAM

## 2025-01-11 PROCEDURE — 25000242 PHARM REV CODE 250 ALT 637 W/ HCPCS: Performed by: NURSE PRACTITIONER

## 2025-01-11 PROCEDURE — 97110 THERAPEUTIC EXERCISES: CPT

## 2025-01-11 PROCEDURE — 25000003 PHARM REV CODE 250: Performed by: NURSE PRACTITIONER

## 2025-01-11 PROCEDURE — 99900035 HC TECH TIME PER 15 MIN (STAT)

## 2025-01-11 PROCEDURE — 94640 AIRWAY INHALATION TREATMENT: CPT

## 2025-01-11 PROCEDURE — 27000207 HC ISOLATION

## 2025-01-11 PROCEDURE — 27000221 HC OXYGEN, UP TO 24 HOURS

## 2025-01-11 PROCEDURE — 80053 COMPREHEN METABOLIC PANEL: CPT | Performed by: STUDENT IN AN ORGANIZED HEALTH CARE EDUCATION/TRAINING PROGRAM

## 2025-01-11 PROCEDURE — 83735 ASSAY OF MAGNESIUM: CPT | Performed by: STUDENT IN AN ORGANIZED HEALTH CARE EDUCATION/TRAINING PROGRAM

## 2025-01-11 PROCEDURE — 63600175 PHARM REV CODE 636 W HCPCS: Performed by: NURSE PRACTITIONER

## 2025-01-11 PROCEDURE — 94761 N-INVAS EAR/PLS OXIMETRY MLT: CPT

## 2025-01-11 PROCEDURE — 84100 ASSAY OF PHOSPHORUS: CPT | Performed by: STUDENT IN AN ORGANIZED HEALTH CARE EDUCATION/TRAINING PROGRAM

## 2025-01-11 RX ORDER — ENOXAPARIN SODIUM 100 MG/ML
40 INJECTION SUBCUTANEOUS EVERY 24 HOURS
Status: DISCONTINUED | OUTPATIENT
Start: 2025-01-12 | End: 2025-01-18

## 2025-01-11 RX ADMIN — BENZONATATE 100 MG: 100 CAPSULE ORAL at 09:01

## 2025-01-11 RX ADMIN — ALPRAZOLAM 0.25 MG: 0.25 TABLET ORAL at 09:01

## 2025-01-11 RX ADMIN — SACUBITRIL AND VALSARTAN 1 TABLET: 24; 26 TABLET, FILM COATED ORAL at 09:01

## 2025-01-11 RX ADMIN — OXYCODONE HYDROCHLORIDE AND ACETAMINOPHEN 500 MG: 500 TABLET ORAL at 09:01

## 2025-01-11 RX ADMIN — PANTOPRAZOLE SODIUM 40 MG: 40 TABLET, DELAYED RELEASE ORAL at 09:01

## 2025-01-11 RX ADMIN — ATORVASTATIN CALCIUM 40 MG: 40 TABLET, FILM COATED ORAL at 09:01

## 2025-01-11 RX ADMIN — GABAPENTIN 200 MG: 100 CAPSULE ORAL at 09:01

## 2025-01-11 RX ADMIN — GUAIFENESIN AND DEXTROMETHORPHAN HYDROBROMIDE 1 TABLET: 600; 30 TABLET, EXTENDED RELEASE ORAL at 09:01

## 2025-01-11 RX ADMIN — LEVETIRACETAM 500 MG: 500 TABLET, FILM COATED ORAL at 09:01

## 2025-01-11 RX ADMIN — THERA TABS 1 TABLET: TAB at 09:01

## 2025-01-11 RX ADMIN — POLYETHYLENE GLYCOL 3350 17 G: 17 POWDER, FOR SOLUTION ORAL at 09:01

## 2025-01-11 RX ADMIN — IPRATROPIUM BROMIDE AND ALBUTEROL SULFATE 3 ML: 2.5; .5 SOLUTION RESPIRATORY (INHALATION) at 06:01

## 2025-01-11 RX ADMIN — ESCITALOPRAM OXALATE 20 MG: 10 TABLET, FILM COATED ORAL at 09:01

## 2025-01-11 RX ADMIN — GABAPENTIN 200 MG: 100 CAPSULE ORAL at 02:01

## 2025-01-11 RX ADMIN — BENZONATATE 100 MG: 100 CAPSULE ORAL at 02:01

## 2025-01-11 RX ADMIN — ENOXAPARIN SODIUM 50 MG: 60 INJECTION SUBCUTANEOUS at 09:01

## 2025-01-11 RX ADMIN — METOPROLOL TARTRATE 50 MG: 50 TABLET, FILM COATED ORAL at 09:01

## 2025-01-11 RX ADMIN — CLOPIDOGREL BISULFATE 75 MG: 75 TABLET ORAL at 09:01

## 2025-01-11 RX ADMIN — FUROSEMIDE 20 MG: 20 TABLET ORAL at 09:01

## 2025-01-11 RX ADMIN — MONTELUKAST 10 MG: 10 TABLET, FILM COATED ORAL at 09:01

## 2025-01-11 RX ADMIN — ALPRAZOLAM 0.25 MG: 0.25 TABLET ORAL at 02:01

## 2025-01-11 RX ADMIN — MORPHINE SULFATE 2 MG: 2 INJECTION, SOLUTION INTRAMUSCULAR; INTRAVENOUS at 06:01

## 2025-01-11 RX ADMIN — FLUTICASONE PROPIONATE 100 MCG: 50 SPRAY, METERED NASAL at 09:01

## 2025-01-11 NOTE — ASSESSMENT & PLAN NOTE
Patient is identified as Severe COVID-19 based on hypoxemia with O2 saturations <94% on room air or on ambulation   Initiate standard COVID protocols; COVID-19 testing ,Infection Control notification  and isolation- respiratory, contact and droplet per protocol    Diagnostics: CBC, CMP, Ferritin, CRP, Troponin, and Portable CXR    Management: Initiate targeted therapy with Remdesivir, 200mg IV x1, followed by 100mg IV daily x5 days total and Anticoagulation, Patient admitted to non-critical care unit- Will initiate full dose anticoagulation with Weight based lovenox 1mg/kg IV q12, stopped on 1/11/25, Maintain oxygen saturations 92-96% via Nasal Cannula  and monitor with continuous/intermittent pulse oximetry. , and Continuous cardiac monitoring.

## 2025-01-11 NOTE — PLAN OF CARE
PATIENT WAS UNWILLING TO PARTICIPATE PHYSICALLY WITH THERAPY TODAY BUT WAS INTERESTED IN SUGGESTIONS OF ACTIVITIES THAT SHE COULD PERFORM WHILE IN HER ROOM BY HERSELF.     THE FOLLOWING WAS SUGGESTED TO PATIENT:     ROLLING L/R EVERY 20-30 minutes TO DECREASE CHANCE OF BED SORE DEVELOPMENT    SUPINE EXERCISES: AP, SLR, hip abduction, bridges, GS, AND QS.     WITH CONVERSATION, PATIENT BEGAN TO COUGH AND BECOME SOB

## 2025-01-11 NOTE — SUBJECTIVE & OBJECTIVE
Interval History: Epistaxis noted, worsened from previous day. Reduced supplemental O2 to 1L, stopped full does lovenox, ordered ppx dose. Hgb stable    Review of Systems   Constitutional:  Positive for activity change, appetite change and fatigue.   HENT:  Positive for congestion and nosebleeds.    Respiratory:  Positive for cough (productive), chest tightness and shortness of breath. Negative for wheezing and stridor.    Cardiovascular:  Negative for chest pain.   Gastrointestinal:  Positive for abdominal distention and abdominal pain.   Musculoskeletal:  Positive for arthralgias.   Neurological:  Positive for weakness and headaches.   All other systems reviewed and are negative.    Objective:     Vital Signs (Most Recent):  Temp: 98 °F (36.7 °C) (01/11/25 0835)  Pulse: 63 (01/11/25 1141)  Resp: 18 (01/11/25 0835)  BP: (!) 147/65 (01/11/25 0835)  SpO2: 96 % (01/11/25 1145) Vital Signs (24h Range):  Temp:  [97.7 °F (36.5 °C)-98 °F (36.7 °C)] 98 °F (36.7 °C)  Pulse:  [54-79] 63  Resp:  [18-20] 18  SpO2:  [88 %-100 %] 96 %  BP: ()/(50-66) 147/65     Weight: 51.1 kg (112 lb 10.5 oz)  Body mass index is 19.34 kg/m².    Intake/Output Summary (Last 24 hours) at 1/11/2025 1226  Last data filed at 1/11/2025 0800  Gross per 24 hour   Intake 300 ml   Output 750 ml   Net -450 ml         Physical Exam  Vitals and nursing note reviewed.   Constitutional:       General: She is not in acute distress.     Appearance: She is underweight. She is ill-appearing.   HENT:      Head: Normocephalic and atraumatic.      Right Ear: Hearing and external ear normal.      Left Ear: Hearing and external ear normal.      Nose: No rhinorrhea.      Left Nostril: Epistaxis present.      Right Sinus: No maxillary sinus tenderness or frontal sinus tenderness.      Left Sinus: No maxillary sinus tenderness or frontal sinus tenderness.      Mouth/Throat:      Mouth: No oral lesions.      Pharynx: Uvula midline.   Eyes:      General:         Right  eye: No discharge.         Left eye: No discharge.      Conjunctiva/sclera: Conjunctivae normal.      Pupils: Pupils are equal, round, and reactive to light.   Neck:      Thyroid: No thyromegaly.      Vascular: No carotid bruit.      Trachea: No tracheal deviation.   Cardiovascular:      Rate and Rhythm: Normal rate and regular rhythm.      Pulses:           Dorsalis pedis pulses are 1+ on the right side and 1+ on the left side.      Heart sounds: Normal heart sounds, S1 normal and S2 normal. No murmur heard.  Pulmonary:      Effort: Tachypnea present. No respiratory distress.      Breath sounds: Examination of the right-upper field reveals rhonchi. Examination of the left-upper field reveals rhonchi. Examination of the right-lower field reveals decreased breath sounds. Examination of the left-lower field reveals decreased breath sounds. Decreased breath sounds and rhonchi present.   Abdominal:      General: Bowel sounds are normal. There is distension.      Palpations: Abdomen is soft. There is no mass.      Tenderness: There is abdominal tenderness in the suprapubic area and left lower quadrant.   Musculoskeletal:         General: Normal range of motion.      Cervical back: Normal range of motion.   Lymphadenopathy:      Cervical: No cervical adenopathy.      Upper Body:      Right upper body: No supraclavicular adenopathy.      Left upper body: No supraclavicular adenopathy.   Skin:     General: Skin is warm and dry.      Capillary Refill: Capillary refill takes less than 2 seconds.      Coloration: Skin is pale.      Findings: No rash.   Neurological:      Mental Status: She is oriented to person, place, and time. She is lethargic.   Psychiatric:         Mood and Affect: Mood is anxious. Mood is not depressed. Affect is labile and tearful.         Speech: Speech is not rapid and pressured.         Behavior: Behavior is not hyperactive.         Judgment: Judgment is not impulsive.             Significant Labs:  All pertinent labs within the past 24 hours have been reviewed.  CBC:   Recent Labs   Lab 01/10/25  0531 01/11/25  0557   WBC 10.42 10.96   HGB 12.9 12.1   HCT 42.4 38.8   * 152     CMP:   Recent Labs   Lab 01/10/25  0531 01/11/25  0557   * 134*   K 4.6 4.4    100   CO2 18* 24   GLU 76 99   BUN 32* 63*   CREATININE 0.9 1.0   CALCIUM 9.1 9.7   PROT 6.1 5.6*   ALBUMIN 2.9* 2.6*   BILITOT 0.5 0.3   ALKPHOS 54 52   AST 18 14   ALT 22 20   ANIONGAP 12 10       Significant Imaging: I have reviewed all pertinent imaging results/findings within the past 24 hours.

## 2025-01-11 NOTE — PT/OT/SLP PROGRESS
Physical Therapy  Treatment    Shireen Felix   MRN: [id number]   Admitting Diagnosis: COVID-19       PT Start Time: 1215     PT Stop Time: 1230    PT Total Time (min): 15 min       Billable Minutes:  Therapeutic Activity 15    Treatment Type: Treatment  PT/PTA: PTA     Number of PTA visits since last PT visit: 2       General Precautions: Standard, airborne, droplet, contact, fall  Orthopedic Precautions: N/A  Braces: N/A  Respiratory Status: Room air    Spiritual, Cultural Beliefs, Gnosticism Practices, Values that Affect Care: no    Subjective:  Communicated with CRYSTAL prior to session.    Pain/Comfort  Pain Rating 1: 0/10  Pain Rating Post-Intervention 1: 0/10    Treatment and Education:    PATIENT WAS UNWILLING TO PARTICIPATE PHYSICALLY WITH THERAPY TODAY BUT WAS INTERESTED IN SUGGESTIONS OF ACTIVITIES THAT SHE COULD PERFORM WHILE IN HER ROOM BY HERSELF.     THE FOLLOWING WAS SUGGESTED TO PATIENT:     ROLLING L/R EVERY 20-30 minutes TO DECREASE CHANCE OF BED SORE DEVELOPMENT    SUPINE EXERCISES: AP, SLR, hip abduction, bridges, GS, AND QS.     WITH CONVERSATION, PATIENT BEGAN TO COUGH AND BECOME SOB       AM-PAC 6 CLICK MOBILITY  How much help from another person does this patient currently need?   1 = Unable, Total/Dependent Assistance  2 = A lot, Maximum/Moderate Assistance  3 = A little, Minimum/Contact Guard/Supervision  4 = None, Modified [place]/Independent    Turning over in bed (including adjusting bedclothes, sheets and blankets)?:  (NA)  Sitting down on and standing up from a chair with arms (e.g., wheelchair, bedside commode, etc.):  (NA)  Moving from lying on back to sitting on the side of the bed?:  (NA)  Moving to and from a bed to a chair (including a wheelchair)?:  (NA)  Need to walk in hospital room?:  (NA)  Climbing 3-5 steps with a railing?:  (NA)    AM-PAC Raw Score CMS G-Code Modifier Level of Impairment Assistance   6 % Total / Unable   7 - 9 CM 80 - 100% Maximal Assist    10 - 14 CL 60 - 80% Moderate Assist   15 - 19 CK 40 - 60% Moderate Assist   20 - 22 CJ 20 - 40% Minimal Assist   23 CI 1-20% SBA / CGA   24 CH 0% Independent/ Mod I     Patient left supine with all lines intact and call button in reach.    Assessment:  Shireen Felix is a 79 y.o. female with a medical diagnosis of COVID-19 and presents with .    Rehab identified problem list/impairments: weakness, gait instability, decreased lower extremity function, impaired endurance, impaired cardiopulmonary response to activity, impaired self care skills, impaired functional mobility    Rehab potential is poor.    Activity tolerance: Poor    Discharge recommendations: Moderate Intensity Therapy      Barriers to discharge:      Equipment recommendations: to be determined by next level of care     GOALS:   Multidisciplinary Problems       Physical Therapy Goals          Problem: Physical Therapy    Goal Priority Disciplines Outcome Interventions   Physical Therapy Goal     PT, PT/OT Progressing    Description: Goals to be met by 1/17/25.  1. Pt will complete bed mobility SBA.  2. Pt will complete sit to stand SBA.  3. Pt will ambulate 100ft SBA using RW.  4. Pt will increase AMPAC score by 2 points to progress functional mobility.                       PLAN:    Patient to be seen 3 x/week to address the above listed problems via gait training, therapeutic activities, therapeutic exercises  Plan of Care expires: 01/17/25  Plan of Care reviewed with: patient         01/11/2025

## 2025-01-11 NOTE — ASSESSMENT & PLAN NOTE
Creatine stable for now. BMP reviewed- noted Estimated Creatinine Clearance: 36.2 mL/min (based on SCr of 1 mg/dL). according to latest data. Based on current GFR, CKD stage is stage 4 - GFR 15-29.  Monitor UOP and serial BMP and adjust therapy as needed. Renally dose meds. Avoid nephrotoxic medications and procedures.  -Creatinine 1.5 on admission however 3 months ago was 1.2    Recent Labs     01/09/25  0508 01/10/25  0531 01/11/25  0557   CREATININE 0.9 0.9 1.0       -Due to GFR, will need to dc ranexa for now and decrease dose of gabapentin  -Hold spironolactone   -Continue Entresto   -Monitor creatinine  -stable

## 2025-01-11 NOTE — PROGRESS NOTES
[facility] Medicine  Progress Note    Patient Name: Shireen Felix  MRN: [id number]  Patient Class: IP- Inpatient   Admission Date: 12/31/2024  Length of Stay: 10 days  Attending Physician: Everardo Graf MD  Primary Care Provider: Laron Chaudhari MD        Subjective     Principal Problem:COVID-19        HPI:  Shireen Felix is a 79 year old female who has  has a past medical history of Abnormal ECG, Acute bronchitis and bronchiolitis, Aneurysm, Anticoagulant long-term use, Arthritis, CAD in native artery, COPD (chronic obstructive pulmonary disease), Diabetes mellitus, Fall, Glaucoma, Hypertension, Seizures, Shingles, and Stroke who presented to Emergency Department for evaluation for cough. Associated symptoms include: congestion, wheezing, and SOB. She was seen by her pulmonologist today, Dr. Sam. Started as sinus pain and drainage and now has progressed to respiratory symptoms. She was seen a few days ago in the ER given prescription for antibiotics steroids but these have not helped much. Today in the office she can barely talk without coughing continually. She is having a difficult time putting more than 3 words together without severe cough paroxysms. ED workup showed Hgb/Hct 11.9/37.0, K+ 3.1, CO2 16, BUN/creatinine 34/1.5. CT chest w/out contrast stable suspected pulmonary fibrosis of the lungs. No acute infiltrates. She has received DuoNeb x 1, solumedrol 125 mg IV x 1, KCL 40 mEq PO x 1 and NS 1L bolus. Pt admitted for COVID.     Overview/Hospital Course:  Admitted to Hospital Medicine for acute on chronic respiratory failure concerns in setting of COVID-19.  Started on IV methylprednisolone, remdesivir, scheduled respiratory treatments. 01/04: Reported left lower quadrant abdominal pain with tenderness to palpation, will evaluate via CT as patient is on therapeutic anticoagulation for COVID-19.  CT abdomen/pelvis nonacute. 01/05:  Reported improvements  in respiratory status with decreased frequency of cough and dyspnea but still reporting significant symptoms from baseline.  On 1/6, patient had a significant event with acute tachypnea, tachycardia. Pulmonology consulted, orders adjusted. Patient stable overnight, had another event on 1/7 similar to previous day. Possible exacerbation related to anxiety provoked by medications and congestion. Case reviewed, orders adjusted. Per Pulmonology, due to underlying lung fibrosis, patient is likely not benefiting from bronchodilators. Stopped on 1/7, patient did not experience any further episodes of anxiety, respiratory distress. Will continue xanax and prn morphine. Patient with epistaxis, small clots in tissue noted. Reduced supplemental O2 to 1L at rest, stopped full dose lovenox and placed on ppx dose. Hgb stable.   Difficulty with discharge plan, patient originally planned to d/c to SNF, now prefers to d/c home.  Pulmonology discussed home palliative care vs hospice.  Patient and family wish to discuss the Services further.  Per discussion with patient, family will be available for an informational visit on Monday.  Educated patient on results of oxygen evaluation, advised patient to reduce oxygen to 2 L or less at rest, requires 4 L with exertion.  As clinically indicated if significant improvement noted will repeat oxygen evaluation.     Interval History: Epistaxis noted, worsened from previous day. Reduced supplemental O2 to 1L, stopped full does lovenox, ordered ppx dose. Hgb stable    Review of Systems   Constitutional:  Positive for activity change, appetite change and fatigue.   HENT:  Positive for congestion and nosebleeds.    Respiratory:  Positive for cough (productive), chest tightness and shortness of breath. Negative for wheezing and stridor.    Cardiovascular:  Negative for chest pain.   Gastrointestinal:  Positive for abdominal distention and abdominal pain.   Musculoskeletal:  Positive for  arthralgias.   Neurological:  Positive for weakness and headaches.   All other systems reviewed and are negative.    Objective:     Vital Signs (Most Recent):  Temp: 98 °F (36.7 °C) (01/11/25 0835)  Pulse: 63 (01/11/25 1141)  Resp: 18 (01/11/25 0835)  BP: (!) 147/65 (01/11/25 0835)  SpO2: 96 % (01/11/25 1145) Vital Signs (24h Range):  Temp:  [97.7 °F (36.5 °C)-98 °F (36.7 °C)] 98 °F (36.7 °C)  Pulse:  [54-79] 63  Resp:  [18-20] 18  SpO2:  [88 %-100 %] 96 %  BP: ()/(50-66) 147/65     Weight: 51.1 kg (112 lb 10.5 oz)  Body mass index is 19.34 kg/m².    Intake/Output Summary (Last 24 hours) at 1/11/2025 1226  Last data filed at 1/11/2025 0800  Gross per 24 hour   Intake 300 ml   Output 750 ml   Net -450 ml         Physical Exam  Vitals and nursing note reviewed.   Constitutional:       General: She is not in acute distress.     Appearance: She is underweight. She is ill-appearing.   HENT:      Head: Normocephalic and atraumatic.      Right Ear: Hearing and external ear normal.      Left Ear: Hearing and external ear normal.      Nose: No rhinorrhea.      Left Nostril: Epistaxis present.      Right Sinus: No maxillary sinus tenderness or frontal sinus tenderness.      Left Sinus: No maxillary sinus tenderness or frontal sinus tenderness.      Mouth/Throat:      Mouth: No oral lesions.      Pharynx: Uvula midline.   Eyes:      General:         Right eye: No discharge.         Left eye: No discharge.      Conjunctiva/sclera: Conjunctivae normal.      Pupils: Pupils are equal, round, and reactive to light.   Neck:      Thyroid: No thyromegaly.      Vascular: No carotid bruit.      Trachea: No tracheal deviation.   Cardiovascular:      Rate and Rhythm: Normal rate and regular rhythm.      Pulses:           Dorsalis pedis pulses are 1+ on the right side and 1+ on the left side.      Heart sounds: Normal heart sounds, S1 normal and S2 normal. No murmur heard.  Pulmonary:      Effort: Tachypnea present. No respiratory  distress.      Breath sounds: Examination of the right-upper field reveals rhonchi. Examination of the left-upper field reveals rhonchi. Examination of the right-lower field reveals decreased breath sounds. Examination of the left-lower field reveals decreased breath sounds. Decreased breath sounds and rhonchi present.   Abdominal:      General: Bowel sounds are normal. There is distension.      Palpations: Abdomen is soft. There is no mass.      Tenderness: There is abdominal tenderness in the suprapubic area and left lower quadrant.   Musculoskeletal:         General: Normal range of motion.      Cervical back: Normal range of motion.   Lymphadenopathy:      Cervical: No cervical adenopathy.      Upper Body:      Right upper body: No supraclavicular adenopathy.      Left upper body: No supraclavicular adenopathy.   Skin:     General: Skin is warm and dry.      Capillary Refill: Capillary refill takes less than 2 seconds.      Coloration: Skin is pale.      Findings: No rash.   Neurological:      Mental Status: She is oriented to person, place, and time. She is lethargic.   Psychiatric:         Mood and Affect: Mood is anxious. Mood is not depressed. Affect is labile and tearful.         Speech: Speech is not rapid and pressured.         Behavior: Behavior is not hyperactive.         Judgment: Judgment is not impulsive.             Significant Labs: All pertinent labs within the past 24 hours have been reviewed.  CBC:   Recent Labs   Lab 01/10/25  0531 01/11/25  0557   WBC 10.42 10.96   HGB 12.9 12.1   HCT 42.4 38.8   * 152     CMP:   Recent Labs   Lab 01/10/25  0531 01/11/25  0557   * 134*   K 4.6 4.4    100   CO2 18* 24   GLU 76 99   BUN 32* 63*   CREATININE 0.9 1.0   CALCIUM 9.1 9.7   PROT 6.1 5.6*   ALBUMIN 2.9* 2.6*   BILITOT 0.5 0.3   ALKPHOS 54 52   AST 18 14   ALT 22 20   ANIONGAP 12 10       Significant Imaging: I have reviewed all pertinent imaging results/findings within the past 24  hours.    Assessment and Plan     * COVID-19  Patient is identified as Severe COVID-19 based on hypoxemia with O2 saturations <94% on room air or on ambulation   Initiate standard COVID protocols; COVID-19 testing ,Infection Control notification  and isolation- respiratory, contact and droplet per protocol    Diagnostics: CBC, CMP, Ferritin, CRP, Troponin, and Portable CXR    Management: Initiate targeted therapy with Remdesivir, 200mg IV x1, followed by 100mg IV daily x5 days total and Anticoagulation, Patient admitted to non-critical care unit- Will initiate full dose anticoagulation with Weight based lovenox 1mg/kg IV q12, stopped on 1/11/25, Maintain oxygen saturations 92-96% via Nasal Cannula  and monitor with continuous/intermittent pulse oximetry. , and Continuous cardiac monitoring.    Epistaxis  Likely secondary to supplemental O2 and full anticoagulation    --Stop full dose lovenox on 1/11  --Recommend humidified O2 when in use  --mupirocin in bilateral nares      Acute on chronic respiratory failure with hypoxia  Patient with Hypoxic Respiratory failure which is Acute on chronic.  she is not on home oxygen. Supplemental oxygen was provided and noted- Oxygen Concentration (%):  [28] 28    .   Signs/symptoms of respiratory failure include- tachypnea, increased work of breathing, respiratory distress, use of accessory muscles, wheezing, stridor, and lethargy. Contributing diagnoses includes - Interstitial lung disease Labs and images were reviewed. Patient Has recent ABG, which has been reviewed. Will treat underlying causes and adjust management of respiratory failure as follows-     Home Oxygen evaluation performed:  Home Oxygen Evaluation - [facility] - Cardiopulmonary Department      Date Performed: 1/8/2025     1)Patient's Home O2 Sat on room air, while at rest: Room Air SpO2 At Rest: 99 %                             If O2 sats on room air at rest are 88% or below, patient qualifies.  Document  O2 liter flow needed in Step 2.  If O2 sats are 89% or above, complete Step 3.        2)         If patient is not ambulated and O2 sats are <88%, what is the O2 liter flow required to meet ordered saturation? Home O2 Eval Comments: pt qualifies for home oxygen     If O2 sats on room air while exercising remain 89% or above patient does not qualify, no further testing needed Document N/A in step 3. If O2 sats on room air while exercising are 88% or below, continue to Step 4.     3)Patient's O2 Sat on room air while exercising: Room Air SpO2 During Ambulation: (!) 78 %                             4)Patient's O2 Sat while exercising on O2: SpO2 During Ambulation on O2: 96 % at Ambulation O2 LPM: 4 LPM                             (Must show improvement from #4 for patients to qualify)    Patient qualifies for Home O2 @4L NC due to exertional dyspnea, hypoxia    Hypokalemia  Patient's most recent potassium results are listed below.   Recent Labs     01/08/25  0604 01/09/25  0508 01/10/25  0531   K 4.3 4.9 4.6       Plan  - Replete potassium per protocol  - Monitor potassium Daily  - Patient's hypokalemia is  being treated. Received KCL 40 mEq PO x 1 in ED  -  Stable    CKD (chronic kidney disease)  Creatine stable for now. BMP reviewed- noted Estimated Creatinine Clearance: 36.2 mL/min (based on SCr of 1 mg/dL). according to latest data. Based on current GFR, CKD stage is stage 4 - GFR 15-29.  Monitor UOP and serial BMP and adjust therapy as needed. Renally dose meds. Avoid nephrotoxic medications and procedures.  -Creatinine 1.5 on admission however 3 months ago was 1.2    Recent Labs     01/09/25  0508 01/10/25  0531 01/11/25  0557   CREATININE 0.9 0.9 1.0       -Due to GFR, will need to dc ranexa for now and decrease dose of gabapentin  -Hold spironolactone   -Continue Entresto   -Monitor creatinine  -stable    Chronic systolic heart failure  Patient has Diastolic (HFpEF) heart failure that is Chronic. On  "presentation their CHF was well compensated. Most recent BNP and echo results are listed below.  No results for input(s): "BNP" in the last 72 hours.    Latest ECHO  Results for orders placed during the hospital encounter of 06/02/23    Echo    Interpretation Summary  · The left ventricle is normal in size with concentric hypertrophy and low normal systolic function.  · The estimated ejection fraction is 50%.  · Normal left ventricular diastolic function.  · There is abnormal septal wall motion consistent with left bundle branch block.  · Normal right ventricular size with normal right ventricular systolic function.  · Normal central venous pressure (3 mmHg).  · The estimated PA systolic pressure is 30 mmHg.  · Mild mitral regurgitation.    Current Heart Failure Medications  sacubitriL-valsartan 24-26 mg per tablet 1 tablet, 2 times daily, Oral  furosemide tablet 20 mg, Daily, Oral    Plan  - Monitor strict I&Os and daily weights.    - Place on telemetry  - Low sodium diet  - Place on fluid restriction of 1.5 L.   - Cardiology has not been consulted  - The patient's volume status is at their baseline  - Repeat ECHO, EF improved  -stop IV Lasix, resume home dose p.o. Lasix           Mixed hyperlipidemia  Lab Results   Component Value Date    CHOL 112 (L) 07/12/2024    HDL 41 07/12/2024    LDLCALC 49.8 (L) 07/12/2024    TRIG 106 07/12/2024     -Continue Statin    Normocytic anemia  Anemia is likely due to chronic disease due to Chronic Kidney Disease. Most recent hemoglobin and hematocrit are listed below.  Recent Labs     01/08/25  0604 01/09/25  0508 01/10/25  0531   HGB 12.9 11.8* 12.9   HCT 42.3 39.5 42.4       Plan  - Monitor serial CBC: Daily  - Transfuse PRBC if patient becomes hemodynamically unstable, symptomatic or H/H drops below 7/21.  - Patient has not received any PRBC transfusions to date  - Patient's anemia is currently stable    Seizure  Hx of seizures, patient has not had a seizure during this " admission.     -Continue Keppra  -Seizure precautions      Coronary artery disease of native artery of native heart with stable angina pectoris  Patient with known CAD, which is controlled Will continue Plavix and Statin and monitor for S/Sx of angina/ACS. Continue to monitor on telemetry.     UIP (usual interstitial pneumonitis)  Pulm fibrosis hx- followed by pulmonology outpatient  -Will need close pulm f/u after discharge  -  Patient and family considering admission to hospice,  family able to meet on Monday        GERD (gastroesophageal reflux disease)  -Continue PPI        VTE Risk Mitigation (From admission, onward)           Ordered     enoxaparin injection 40 mg  Every 24 hours         01/11/25 1203                    Discharge Planning   KATHERINE:      Code Status: Full Code   Medical Readiness for Discharge Date:   Discharge Plan A: Other (Pending Progression)   Discharge Delays: None known at this time            Please place Justification for DME        Emi Jenkins NP  Department of Hospital Medicine   [facility] - Telemetry ([facility]

## 2025-01-11 NOTE — ASSESSMENT & PLAN NOTE
Patient with Hypoxic Respiratory failure which is Acute on chronic.  she is not on home oxygen. Supplemental oxygen was provided and noted- Oxygen Concentration (%):  [28] 28    .   Signs/symptoms of respiratory failure include- tachypnea, increased work of breathing, respiratory distress, use of accessory muscles, wheezing, stridor, and lethargy. Contributing diagnoses includes - Interstitial lung disease Labs and images were reviewed. Patient Has recent ABG, which has been reviewed. Will treat underlying causes and adjust management of respiratory failure as follows-     Home Oxygen evaluation performed:  Home Oxygen Evaluation - [facility] - Cardiopulmonary Department      Date Performed: 1/8/2025     1)Patient's Home O2 Sat on room air, while at rest: Room Air SpO2 At Rest: 99 %                             If O2 sats on room air at rest are 88% or below, patient qualifies.  Document O2 liter flow needed in Step 2.  If O2 sats are 89% or above, complete Step 3.        2)         If patient is not ambulated and O2 sats are <88%, what is the O2 liter flow required to meet ordered saturation? Home O2 Eval Comments: pt qualifies for home oxygen     If O2 sats on room air while exercising remain 89% or above patient does not qualify, no further testing needed Document N/A in step 3. If O2 sats on room air while exercising are 88% or below, continue to Step 4.     3)Patient's O2 Sat on room air while exercising: Room Air SpO2 During Ambulation: (!) 78 %                             4)Patient's O2 Sat while exercising on O2: SpO2 During Ambulation on O2: 96 % at Ambulation O2 LPM: 4 LPM                             (Must show improvement from #4 for patients to qualify)    Patient qualifies for Home O2 @4L NC due to exertional dyspnea, hypoxia

## 2025-01-11 NOTE — PT/OT/SLP PROGRESS
"Occupational Therapy   Treatment    Name: Shireen Felix  MRN: [id number]  Admitting Diagnosis:  COVID-19       Recommendations:     Discharge Recommendations: Moderate Intensity Therapy  Discharge Equipment Recommendations:  to be determined by next level of care  Barriers to discharge:  Decreased caregiver support    Assessment:     Shireen Felix is a 79 y.o. female with a medical diagnosis of COVID-19.  She presents with the following performance deficits affecting function are weakness, impaired endurance, impaired self care skills, impaired functional mobility, gait instability, impaired balance, pain, impaired cardiopulmonary response to activity, decreased safety awareness, decreased lower extremity function, decreased upper extremity function, decreased coordination, decreased ROM.     Rehab Prognosis:  Fair; patient would benefit from acute skilled OT services to address these deficits and reach maximum level of function.       Plan:     Patient to be seen 2 x/week to address the above listed problems via self-care/home management, therapeutic activities, therapeutic exercises  Plan of Care Expires: 01/17/25  Plan of Care Reviewed with: patient    Subjective     Chief Complaint: "I know I need to get up but I just don't think I can right now"  Patient/Family Comments/goals: none verbalized   Pain/Comfort:  Pain Rating 1:  (reports pain generalized)  Pain Addressed 1:  (ax pacing)    Objective:     Communicated with: nursingdonovan, prior to session.  Patient found supine with peripheral IV, oxygen, pulse ox (continuous), PureWick upon OT entry to room.    General Precautions: Standard, airborne, contact, droplet, fall    Orthopedic Precautions:N/A  Braces: N/A  Respiratory Status: Nasal cannula, flow 2 L/min     Occupational Performance:     Bed Mobility:    Declined position change this AM     Functional Mobility/Transfers:  Declined this date     Activities of Daily Living:  Declined this date "       [facility] 6 Click ADL: 17    Treatment & Education:  Therex review and education provided on benefits of therapy with increased sitting tolerance. Encouraged completion of B UE AROM therex throughout the day to increase functional strength and activity tolerance needed for ADL completion.  OT role, plan of care, progression of goals, importance of continued OOB activity, ADL/functional transfer and mobility retraining, call don't fall, safety precautions, fall prevention.      Patient left supine with all lines intact, call button in reach, and bed alarm on    GOALS:   Multidisciplinary Problems       Occupational Therapy Goals          Problem: Occupational Therapy    Goal Priority Disciplines Outcome Interventions   Occupational Therapy Goal     OT, PT/OT Progressing    Description: O.T GOALS MET BY 1-17-25  PT WILL TOLERATE  1 SET X 12 REPS B UE ROM EXERCISE  S WITH UE DRESSING  S WITH LE DRESSING  S WITH TOILET TRANSFERS                         Time Tracking:     OT Date of Treatment: 01/11/25  OT Start Time: 1010  OT Stop Time: 1020  OT Total Time (min): 10 min    Billable Minutes:Therapeutic Exercise 10  HOANG Wong  A client care conference was performed between the OTR/L and BOLTON, prior to treatment by BOLTON, to discuss the patient's status, treatment plan and established goals.      OT/CORINA: CORINA     Number of CORINA visits since last OT visit: 2    1/11/2025

## 2025-01-11 NOTE — ASSESSMENT & PLAN NOTE
Likely secondary to supplemental O2 and full anticoagulation    --Stop full dose lovenox on 1/11  --Recommend humidified O2 when in use  --mupirocin in bilateral nares

## 2025-01-11 NOTE — PLAN OF CARE
A223/A223 OLU Felix is a 79 y.o.female admitted on 12/31/2024 for COVID-19   Code Status: Full Code MRN: [id number]   Review of patient's allergies indicates:   Allergen Reactions    Penicillins Anaphylaxis, Hives, Shortness Of Breath and Swelling    Penicillin     Adhesive Rash    Doxycycline Nausea Only    Oxycodone Other (See Comments)     Fatigue      Sulfa (sulfonamide antibiotics) Itching     Past Medical History:   Diagnosis Date    Abnormal ECG 08/05/2019    Acute bronchitis and bronchiolitis 12/31/2024    Aneurysm     Anticoagulant long-term use     Arthritis     CAD in native artery 08/05/2019    Diabetes mellitus     Fall 10/10/2019    Formatting of this note might be different from the original. Found sitting on floor next to bed last night Mild confusion today Does not recall falling or how she ended up on floor UA today Labs yesterday unremarkable    Glaucoma     Hypertension     Pulmonary fibrosis     Seizures     Shingles 05/27/2017    Stroke       PRN meds    acetaminophen, 650 mg, Q6H PRN  albuterol-ipratropium, 3 mL, Q4H PRN  dextrose 50%, 12.5 g, PRN  dextrose 50%, 25 g, PRN  glucagon (human recombinant), 1 mg, PRN  glucose, 16 g, PRN  glucose, 24 g, PRN  HYDROcodone-acetaminophen, 1 tablet, Q6H PRN  melatonin, 6 mg, Nightly PRN  methocarbamoL, 500 mg, TID PRN  morphine, 2 mg, Q4H PRN  ondansetron, 4 mg, Q8H PRN  sodium chloride 0.9%, 10 mL, PRN      Chart check completed. Will continue plan of care.   Nose bleeds today - lovenox adjusted see mar  02 weaned to 1L while at rest - activity demand requires 2-3 LPM   Scheduled xanax and tessalon pearls   PT/OT     Orientation: oriented x 4  [place] Coma Scale Score: 15     Lead Monitored: Lead II Rhythm: normal sinus rhythm Frequency/Ectopy: PVCs  Cardiac/Telemetry Box Number: [id number]  VTE Core Measure: Pharmacological prophylaxis initiated/maintained Last Bowel Movement: 01/09/25  Diet Cardiac Standard Tray  Voiding Characteristics:  external catheter  Gilberto Score: 14  Fall Risk Score: 17  Accucheck [x]   Freq?      Lines/Drains/Airways       Drain  Duration             Female External Urinary Catheter w/ Suction 12/31/24 2230 10 days              Peripheral Intravenous Line  Duration                  Peripheral IV - Single Lumen 01/04/25 1317 22 G Left;Posterior;Proximal Forearm 7 days

## 2025-01-12 LAB
ALBUMIN SERPL BCP-MCNC: 2.9 G/DL (ref 3.5–5.2)
ALP SERPL-CCNC: 57 U/L (ref 40–150)
ALT SERPL W/O P-5'-P-CCNC: 31 U/L (ref 10–44)
ANION GAP SERPL CALC-SCNC: 11 MMOL/L (ref 8–16)
AST SERPL-CCNC: 23 U/L (ref 10–40)
BASOPHILS # BLD AUTO: 0.02 K/UL (ref 0–0.2)
BASOPHILS NFR BLD: 0.2 % (ref 0–1.9)
BILIRUB SERPL-MCNC: 0.4 MG/DL (ref 0.1–1)
BUN SERPL-MCNC: 73 MG/DL (ref 8–23)
CALCIUM SERPL-MCNC: 10.3 MG/DL (ref 8.7–10.5)
CHLORIDE SERPL-SCNC: 100 MMOL/L (ref 95–110)
CO2 SERPL-SCNC: 26 MMOL/L (ref 23–29)
CREAT SERPL-MCNC: 1.2 MG/DL (ref 0.5–1.4)
DIFFERENTIAL METHOD BLD: ABNORMAL
EOSINOPHIL # BLD AUTO: 0.2 K/UL (ref 0–0.5)
EOSINOPHIL NFR BLD: 1.9 % (ref 0–8)
ERYTHROCYTE [DISTWIDTH] IN BLOOD BY AUTOMATED COUNT: 14.6 % (ref 11.5–14.5)
EST. GFR  (NO RACE VARIABLE): 46 ML/MIN/1.73 M^2
GLUCOSE SERPL-MCNC: 112 MG/DL (ref 70–110)
HCT VFR BLD AUTO: 44.6 % (ref 37–48.5)
HGB BLD-MCNC: 13.7 G/DL (ref 12–16)
IMM GRANULOCYTES # BLD AUTO: 0.23 K/UL (ref 0–0.04)
IMM GRANULOCYTES NFR BLD AUTO: 1.8 % (ref 0–0.5)
LYMPHOCYTES # BLD AUTO: 2.1 K/UL (ref 1–4.8)
LYMPHOCYTES NFR BLD: 16.4 % (ref 18–48)
MAGNESIUM SERPL-MCNC: 1.6 MG/DL (ref 1.6–2.6)
MCH RBC QN AUTO: 30.8 PG (ref 27–31)
MCHC RBC AUTO-ENTMCNC: 30.7 G/DL (ref 32–36)
MCV RBC AUTO: 100 FL (ref 82–98)
MONOCYTES # BLD AUTO: 1.1 K/UL (ref 0.3–1)
MONOCYTES NFR BLD: 8.9 % (ref 4–15)
NEUTROPHILS # BLD AUTO: 9 K/UL (ref 1.8–7.7)
NEUTROPHILS NFR BLD: 70.8 % (ref 38–73)
NRBC BLD-RTO: 0 /100 WBC
PHOSPHATE SERPL-MCNC: 3.6 MG/DL (ref 2.7–4.5)
PLATELET # BLD AUTO: 141 K/UL (ref 150–450)
PMV BLD AUTO: 11.2 FL (ref 9.2–12.9)
POTASSIUM SERPL-SCNC: 4.4 MMOL/L (ref 3.5–5.1)
PROT SERPL-MCNC: 6.2 G/DL (ref 6–8.4)
RBC # BLD AUTO: 4.45 M/UL (ref 4–5.4)
SODIUM SERPL-SCNC: 137 MMOL/L (ref 136–145)
WBC # BLD AUTO: 12.63 K/UL (ref 3.9–12.7)

## 2025-01-12 PROCEDURE — 94761 N-INVAS EAR/PLS OXIMETRY MLT: CPT

## 2025-01-12 PROCEDURE — 99900035 HC TECH TIME PER 15 MIN (STAT)

## 2025-01-12 PROCEDURE — 25000003 PHARM REV CODE 250: Performed by: NURSE PRACTITIONER

## 2025-01-12 PROCEDURE — 27000221 HC OXYGEN, UP TO 24 HOURS

## 2025-01-12 PROCEDURE — 80053 COMPREHEN METABOLIC PANEL: CPT | Performed by: STUDENT IN AN ORGANIZED HEALTH CARE EDUCATION/TRAINING PROGRAM

## 2025-01-12 PROCEDURE — 83735 ASSAY OF MAGNESIUM: CPT | Performed by: STUDENT IN AN ORGANIZED HEALTH CARE EDUCATION/TRAINING PROGRAM

## 2025-01-12 PROCEDURE — 84100 ASSAY OF PHOSPHORUS: CPT | Performed by: STUDENT IN AN ORGANIZED HEALTH CARE EDUCATION/TRAINING PROGRAM

## 2025-01-12 PROCEDURE — 63600175 PHARM REV CODE 636 W HCPCS: Performed by: NURSE PRACTITIONER

## 2025-01-12 PROCEDURE — 36415 COLL VENOUS BLD VENIPUNCTURE: CPT | Performed by: STUDENT IN AN ORGANIZED HEALTH CARE EDUCATION/TRAINING PROGRAM

## 2025-01-12 PROCEDURE — 21400001 HC TELEMETRY ROOM

## 2025-01-12 PROCEDURE — 25000003 PHARM REV CODE 250: Performed by: STUDENT IN AN ORGANIZED HEALTH CARE EDUCATION/TRAINING PROGRAM

## 2025-01-12 PROCEDURE — 27000207 HC ISOLATION

## 2025-01-12 PROCEDURE — 85025 COMPLETE CBC W/AUTO DIFF WBC: CPT | Performed by: STUDENT IN AN ORGANIZED HEALTH CARE EDUCATION/TRAINING PROGRAM

## 2025-01-12 RX ADMIN — ALPRAZOLAM 0.25 MG: 0.25 TABLET ORAL at 08:01

## 2025-01-12 RX ADMIN — ATORVASTATIN CALCIUM 40 MG: 40 TABLET, FILM COATED ORAL at 08:01

## 2025-01-12 RX ADMIN — THERA TABS 1 TABLET: TAB at 08:01

## 2025-01-12 RX ADMIN — GABAPENTIN 200 MG: 100 CAPSULE ORAL at 08:01

## 2025-01-12 RX ADMIN — BENZONATATE 100 MG: 100 CAPSULE ORAL at 02:01

## 2025-01-12 RX ADMIN — LEVETIRACETAM 500 MG: 500 TABLET, FILM COATED ORAL at 08:01

## 2025-01-12 RX ADMIN — ESCITALOPRAM OXALATE 20 MG: 10 TABLET, FILM COATED ORAL at 08:01

## 2025-01-12 RX ADMIN — ENOXAPARIN SODIUM 40 MG: 40 INJECTION SUBCUTANEOUS at 04:01

## 2025-01-12 RX ADMIN — OXYCODONE HYDROCHLORIDE AND ACETAMINOPHEN 500 MG: 500 TABLET ORAL at 08:01

## 2025-01-12 RX ADMIN — MORPHINE SULFATE 2 MG: 2 INJECTION, SOLUTION INTRAMUSCULAR; INTRAVENOUS at 05:01

## 2025-01-12 RX ADMIN — BENZONATATE 100 MG: 100 CAPSULE ORAL at 08:01

## 2025-01-12 RX ADMIN — FLUTICASONE PROPIONATE 100 MCG: 50 SPRAY, METERED NASAL at 08:01

## 2025-01-12 RX ADMIN — GABAPENTIN 200 MG: 100 CAPSULE ORAL at 02:01

## 2025-01-12 RX ADMIN — PANTOPRAZOLE SODIUM 40 MG: 40 TABLET, DELAYED RELEASE ORAL at 08:01

## 2025-01-12 RX ADMIN — CLOPIDOGREL BISULFATE 75 MG: 75 TABLET ORAL at 08:01

## 2025-01-12 RX ADMIN — SACUBITRIL AND VALSARTAN 1 TABLET: 24; 26 TABLET, FILM COATED ORAL at 08:01

## 2025-01-12 RX ADMIN — GUAIFENESIN AND DEXTROMETHORPHAN HYDROBROMIDE 1 TABLET: 600; 30 TABLET, EXTENDED RELEASE ORAL at 08:01

## 2025-01-12 RX ADMIN — MONTELUKAST 10 MG: 10 TABLET, FILM COATED ORAL at 08:01

## 2025-01-12 RX ADMIN — METOPROLOL TARTRATE 50 MG: 50 TABLET, FILM COATED ORAL at 08:01

## 2025-01-12 RX ADMIN — ALPRAZOLAM 0.25 MG: 0.25 TABLET ORAL at 02:01

## 2025-01-12 RX ADMIN — FUROSEMIDE 20 MG: 20 TABLET ORAL at 08:01

## 2025-01-12 NOTE — PLAN OF CARE
A223/A223 OLU Felix is a 79 y.o.female admitted on 12/31/2024 for COVID-19   Code Status: Full Code MRN: [id number]   Review of patient's allergies indicates:   Allergen Reactions    Penicillins Anaphylaxis, Hives, Shortness Of Breath and Swelling    Penicillin     Adhesive Rash    Doxycycline Nausea Only    Oxycodone Other (See Comments)     Fatigue      Sulfa (sulfonamide antibiotics) Itching     Past Medical History:   Diagnosis Date    Abnormal ECG 08/05/2019    Acute bronchitis and bronchiolitis 12/31/2024    Aneurysm     Anticoagulant long-term use     Arthritis     CAD in native artery 08/05/2019    Diabetes mellitus     Fall 10/10/2019    Formatting of this note might be different from the original. Found sitting on floor next to bed last night Mild confusion today Does not recall falling or how she ended up on floor UA today Labs yesterday unremarkable    Glaucoma     Hypertension     Pulmonary fibrosis     Seizures     Shingles 05/27/2017    Stroke       PRN meds    acetaminophen, 650 mg, Q6H PRN  albuterol-ipratropium, 3 mL, Q4H PRN  dextrose 50%, 12.5 g, PRN  dextrose 50%, 25 g, PRN  glucagon (human recombinant), 1 mg, PRN  glucose, 16 g, PRN  glucose, 24 g, PRN  HYDROcodone-acetaminophen, 1 tablet, Q6H PRN  melatonin, 6 mg, Nightly PRN  methocarbamoL, 500 mg, TID PRN  morphine, 2 mg, Q4H PRN  ondansetron, 4 mg, Q8H PRN  sodium chloride 0.9%, 10 mL, PRN      Chart check completed. Will continue plan of care.      Orientation: oriented x 4  [place] Coma Scale Score: 15     Lead Monitored: Lead II Rhythm: normal sinus rhythm Frequency/Ectopy: PVCs  Cardiac/Telemetry Box Number: [id number]  VTE Core Measure: Pharmacological prophylaxis initiated/maintained Last Bowel Movement: 01/09/25  Diet Cardiac Standard Tray  Voiding Characteristics: external catheter  Gilberto Score: 14  Fall Risk Score: 17  Accucheck []   Freq?      Lines/Drains/Airways       Drain  Duration             Female External Urinary  Catheter w/ Suction 12/31/24 2230 11 days              Peripheral Intravenous Line  Duration                  Peripheral IV - Single Lumen 01/04/25 1317 22 G Left;Posterior;Proximal Forearm 7 days

## 2025-01-12 NOTE — PLAN OF CARE
A223/A223 LOYDARoberto Felix is a 79 y.o.female admitted on 12/31/2024 for COVID-19   Code Status: Full Code MRN: [id number]   Review of patient's allergies indicates:   Allergen Reactions    Penicillins Anaphylaxis, Hives, Shortness Of Breath and Swelling    Penicillin     Adhesive Rash    Doxycycline Nausea Only    Oxycodone Other (See Comments)     Fatigue      Sulfa (sulfonamide antibiotics) Itching     Past Medical History:   Diagnosis Date    Abnormal ECG 08/05/2019    Acute bronchitis and bronchiolitis 12/31/2024    Aneurysm     Anticoagulant long-term use     Arthritis     CAD in native artery 08/05/2019    Diabetes mellitus     Fall 10/10/2019    Formatting of this note might be different from the original. Found sitting on floor next to bed last night Mild confusion today Does not recall falling or how she ended up on floor UA today Labs yesterday unremarkable    Glaucoma     Hypertension     Pulmonary fibrosis     Seizures     Shingles 05/27/2017    Stroke       PRN meds    acetaminophen, 650 mg, Q6H PRN  albuterol-ipratropium, 3 mL, Q4H PRN  dextrose 50%, 12.5 g, PRN  dextrose 50%, 25 g, PRN  glucagon (human recombinant), 1 mg, PRN  glucose, 16 g, PRN  glucose, 24 g, PRN  HYDROcodone-acetaminophen, 1 tablet, Q6H PRN  melatonin, 6 mg, Nightly PRN  methocarbamoL, 500 mg, TID PRN  morphine, 2 mg, Q4H PRN  ondansetron, 4 mg, Q8H PRN  sodium chloride 0.9%, 10 mL, PRN      Chart check completed. Will continue plan of care.   NEBS   Oxygen at 1L, however with activity her demand requires 2-3L   Scheduled tessalon pearls and xanax  Morphine PRN   PT/OT         Orientation: oriented x 4  [place] Coma Scale Score: 15     Lead Monitored: Lead II Rhythm: normal sinus rhythm Frequency/Ectopy: PVCs  Cardiac/Telemetry Box Number: [id number]  VTE Core Measure: Pharmacological prophylaxis initiated/maintained Last Bowel Movement: 01/10/25  Diet Cardiac Standard Tray  Voiding Characteristics: external catheter  Gilberto Score:  14  Fall Risk Score: 17  Accucheck []   Freq?      Lines/Drains/Airways       Drain  Duration             Female External Urinary Catheter w/ Suction 12/31/24 2230 11 days              Peripheral Intravenous Line  Duration                  Peripheral IV - Single Lumen 01/04/25 1317 22 G Left;Posterior;Proximal Forearm 7 days

## 2025-01-12 NOTE — PROGRESS NOTES
[facility]  Progress Note    Patient Name: Shireen Felix  MRN: [id number]  Patient Class: IP- Inpatient   Admission Date: 12/31/2024  Length of Stay: 11 days  Attending Physician: Everardo Graf MD  Primary Care Provider: Laron Chaudhari MD        Subjective     Principal Problem:COVID-19        HPI:  Shireen Felix is a 79 year old female who has  has a past medical history of Abnormal ECG, Acute bronchitis and bronchiolitis, Aneurysm, Anticoagulant long-term use, Arthritis, CAD in native artery, COPD (chronic obstructive pulmonary disease), Diabetes mellitus, Fall, Glaucoma, Hypertension, Seizures, Shingles, and Stroke who presented to Emergency Department for evaluation for cough. Associated symptoms include: congestion, wheezing, and SOB. She was seen by her pulmonologist today, Dr. Sam. Started as sinus pain and drainage and now has progressed to respiratory symptoms. She was seen a few days ago in the ER given prescription for antibiotics steroids but these have not helped much. Today in the office she can barely talk without coughing continually. She is having a difficult time putting more than 3 words together without severe cough paroxysms. ED workup showed Hgb/Hct 11.9/37.0, K+ 3.1, CO2 16, BUN/creatinine 34/1.5. CT chest w/out contrast stable suspected pulmonary fibrosis of the lungs. No acute infiltrates. She has received DuoNeb x 1, solumedrol 125 mg IV x 1, KCL 40 mEq PO x 1 and NS 1L bolus. Pt admitted for COVID.     Overview/Hospital Course:  Admitted to Hospital Medicine for acute on chronic respiratory failure concerns in setting of COVID-19.  Started on IV methylprednisolone, remdesivir, scheduled respiratory treatments. 01/04: Reported left lower quadrant abdominal pain with tenderness to palpation, will evaluate via CT as patient is on therapeutic anticoagulation for COVID-19.  CT abdomen/pelvis nonacute. 01/05:  Reported improvements  "in respiratory status with decreased frequency of cough and dyspnea but still reporting significant symptoms from baseline.  On 1/6, patient had a significant event with acute tachypnea, tachycardia. Pulmonology consulted, orders adjusted. Patient stable overnight, had another event on 1/7 similar to previous day. Possible exacerbation related to anxiety provoked by medications and congestion. Case reviewed, orders adjusted. Per Pulmonology, due to underlying lung fibrosis, patient is likely not benefiting from bronchodilators. Stopped on 1/7, patient did not experience any further episodes of anxiety, respiratory distress. Will continue xanax and prn morphine. Patient with epistaxis, small clots in tissue noted. Reduced supplemental O2 to 1L at rest, stopped full dose lovenox and placed on ppx dose. Hgb stable.   Difficulty with discharge plan, patient originally planned to d/c to SNF, now prefers to d/c home.  Pulmonology discussed home palliative care vs hospice.  Patient and family wish to discuss the Services further.  Per discussion with patient, family will be available for an informational visit on Monday.  Educated patient on results of oxygen evaluation, advised patient to reduce oxygen to 2 L or less at rest, requires 4 L with exertion.  As clinically indicated if significant improvement noted will repeat oxygen evaluation.     Interval history:  NAEON. SOB stable on 2L (at rest). Unable to participate with PT over weekend. Repeat desat study on Monday. Patient and family wants to try and go home.    Objective:   BP (!) 97/54 (BP Location: Right arm, Patient Position: Lying)   Pulse 83   Temp 98.5 °F (36.9 °C) (Axillary)   Resp 18   Ht 5' 4" (1.626 m)   Wt 52.4 kg (115 lb 8.3 oz)   LMP  (LMP Unknown)   SpO2 100%   BMI 19.83 kg/m²     Intake/Output Summary (Last 24 hours) at 1/12/2025 1618  Last data filed at 1/12/2025 1425  Gross per 24 hour   Intake 1340 ml   Output 1400 ml   Net -60 ml "       PHYSICAL EXAM  Vitals and nursing note reviewed.   Constitutional:       General: She is not in acute distress.     Appearance: She is underweight. She is ill-appearing.   HENT:      Head: Normocephalic and atraumatic.      Right Ear: Hearing and external ear normal.      Left Ear: Hearing and external ear normal.      Nose: No rhinorrhea.      Left Nostril: Epistaxis present.      Right Sinus: No maxillary sinus tenderness or frontal sinus tenderness.      Left Sinus: No maxillary sinus tenderness or frontal sinus tenderness.      Mouth/Throat:      Mouth: No oral lesions.      Pharynx: Uvula midline.   Eyes:      General:         Right eye: No discharge.         Left eye: No discharge.      Conjunctiva/sclera: Conjunctivae normal.      Pupils: Pupils are equal, round, and reactive to light.   Neck:      Thyroid: No thyromegaly.      Vascular: No carotid bruit.      Trachea: No tracheal deviation.   Cardiovascular:      Rate and Rhythm: Normal rate and regular rhythm.      Pulses:           Dorsalis pedis pulses are 1+ on the right side and 1+ on the left side.      Heart sounds: Normal heart sounds, S1 normal and S2 normal. No murmur heard.  Pulmonary:      Effort: Tachypnea present. No respiratory distress.      Breath sounds: Examination of the right-upper field reveals rhonchi. Examination of the left-upper field reveals rhonchi. Examination of the right-lower field reveals decreased breath sounds. Examination of the left-lower field reveals decreased breath sounds. Decreased breath sounds and rhonchi present.   Abdominal:      General: Bowel sounds are normal. There is distension.      Palpations: Abdomen is soft. There is no mass.      Tenderness: There is abdominal tenderness in the suprapubic area and left lower quadrant.   Musculoskeletal:         General: Normal range of motion.      Cervical back: Normal range of motion.   Lymphadenopathy:      Cervical: No cervical adenopathy.      Upper Body:      " Right upper body: No supraclavicular adenopathy.      Left upper body: No supraclavicular adenopathy.   Skin:     General: Skin is warm and dry.      Capillary Refill: Capillary refill takes less than 2 seconds.      Coloration: Skin is pale.      Findings: No rash.   Neurological:      Mental Status: She is oriented to person, place, and time. She is lethargic.   Psychiatric:         Mood and Affect: Mood is anxious. Mood is not depressed. Affect is labile and tearful.         Speech: Speech is not rapid and pressured.         Behavior: Behavior is not hyperactive.         Judgment: Judgment is not impulsive.     LABS  All labs from the past 24 hours were reviewed.     BMP:   Recent Labs   Lab 01/12/25  0525   *      K 4.4      CO2 26   BUN 73*   CREATININE 1.2   CALCIUM 10.3   MG 1.6     CBC:   Recent Labs   Lab 01/11/25  0557 01/12/25  0525   WBC 10.96 12.63   HGB 12.1 13.7   HCT 38.8 44.6    141*     CMP:   Recent Labs   Lab 01/11/25  0557 01/12/25  0525   * 137   K 4.4 4.4    100   CO2 24 26   GLU 99 112*   BUN 63* 73*   CREATININE 1.0 1.2   CALCIUM 9.7 10.3   PROT 5.6* 6.2   ALBUMIN 2.6* 2.9*   BILITOT 0.3 0.4   ALKPHOS 52 57   AST 14 23   ALT 20 31   ANIONGAP 10 11     Cardiac Markers: No results for input(s): "CKMB", "MYOGLOBIN", "BNP", "TROPISTAT" in the last 48 hours.  Coagulation: No results for input(s): "PT", "INR", "APTT" in the last 48 hours.  Lactic Acid: No results for input(s): "LACTATE" in the last 48 hours.  Magnesium:   Recent Labs   Lab 01/11/25  0557 01/12/25  0525   MG 1.6 1.6     Troponin: No results for input(s): "TROPONINI", "TROPONINIHS" in the last 48 hours.  TSH:   No results for input(s): "TSH" in the last 4320 hours.  Urine Studies:   No results for input(s): "COLORU", "APPEARANCEUA", "PHUR", "SPECGRAV", "PROTEINUA", "GLUCUA", "KETONESU", "BILIRUBINUA", "OCCULTUA", "NITRITE", "UROBILINOGEN", "LEUKOCYTESUR", "RBCUA", "WBCUA", "BACTERIA", " ""SQUAMEPITHEL", "HYALINECASTS" in the last 48 hours.    Invalid input(s): "WRIGHTSUR"    IMAGING  All imaging from the past 24 hours were reviewed.     Imaging Results              CT Chest Without Contrast (Final result)  Result time 12/31/24 13:59:22      Final result by Tommy Maravilla MD (Timothy) (12/31/24 13:59:22)                   Impression:      Stable suspected pulmonary fibrosis of the lungs.  No acute infiltrates.      Electronically signed by: Tommy Maravilla MD  Date:    12/31/2024  Time:    13:59               Narrative:    EXAMINATION:  CT CHEST WITHOUT CONTRAST    CLINICAL HISTORY:  Cough, persistent;    TECHNIQUE:  Standard noncontrast CT of the chest.  All CT scans at this facility use dose modulation, iterative reconstruction and/or weight based dosing when appropriate to reduce radiation dose to as low as reasonably achievable.    COMPARISON:  CT scan chest, 11/21/2024    FINDINGS:  Heart is normal in size.  Coronary artery calcifications are present.  Atherosclerosis of thoracic aorta.  No evidence of mediastinal hilar adenopathy.  No pericardial effusions.    Stable interstitial lung changes at the lung bases likely related to honeycombing and fibrosis.  No consolidation.  No acute infiltrates.  Airways appear unremarkable.    No adenopathy is identified.    Stable chronic compression deformities of the thoracic spine.                                        Assessment and Plan     * COVID-19  Patient is identified as Severe COVID-19 based on hypoxemia with O2 saturations <94% on room air or on ambulation   Initiate standard COVID protocols; COVID-19 testing ,Infection Control notification  and isolation- respiratory, contact and droplet per protocol    Diagnostics: CBC, CMP, Ferritin, CRP, Troponin, and Portable CXR    Management: Initiate targeted therapy with Remdesivir, 200mg IV x1, followed by 100mg IV daily x5 days total and Anticoagulation, Patient admitted to non-critical care unit- " Will initiate full dose anticoagulation with Weight based lovenox 1mg/kg IV q12, stopped on 1/11/25, Maintain oxygen saturations 92-96% via Nasal Cannula  and monitor with continuous/intermittent pulse oximetry. , and Continuous cardiac monitoring.    Epistaxis  Likely secondary to supplemental O2 and full anticoagulation    --Stop full dose lovenox on 1/11  --Recommend humidified O2 when in use  --mupirocin in bilateral nares      Acute on chronic respiratory failure with hypoxia  Patient with Hypoxic Respiratory failure which is Acute on chronic.  she is not on home oxygen. Supplemental oxygen was provided and noted- Oxygen Concentration (%):  [28] 28    .   Signs/symptoms of respiratory failure include- tachypnea, increased work of breathing, respiratory distress, use of accessory muscles, wheezing, stridor, and lethargy. Contributing diagnoses includes - Interstitial lung disease Labs and images were reviewed. Patient Has recent ABG, which has been reviewed. Will treat underlying causes and adjust management of respiratory failure as follows-     Home Oxygen evaluation performed:  Home Oxygen Evaluation - [facility] - Cardiopulmonary Department      Date Performed: 1/8/2025     1)Patient's Home O2 Sat on room air, while at rest: Room Air SpO2 At Rest: 99 %                             If O2 sats on room air at rest are 88% or below, patient qualifies.  Document O2 liter flow needed in Step 2.  If O2 sats are 89% or above, complete Step 3.        2)         If patient is not ambulated and O2 sats are <88%, what is the O2 liter flow required to meet ordered saturation? Home O2 Eval Comments: pt qualifies for home oxygen     If O2 sats on room air while exercising remain 89% or above patient does not qualify, no further testing needed Document N/A in step 3. If O2 sats on room air while exercising are 88% or below, continue to Step 4.     3)Patient's O2 Sat on room air while exercising: Room Air SpO2 During  "Ambulation: (!) 78 %                             4)Patient's O2 Sat while exercising on O2: SpO2 During Ambulation on O2: 96 % at Ambulation O2 LPM: 4 LPM                             (Must show improvement from #4 for patients to qualify)    Patient qualifies for Home O2 @4L NC due to exertional dyspnea, hypoxia    Hypokalemia  Patient's most recent potassium results are listed below.   Recent Labs     01/08/25  0604 01/09/25  0508 01/10/25  0531   K 4.3 4.9 4.6       Plan  - Replete potassium per protocol  - Monitor potassium Daily  - Patient's hypokalemia is  being treated. Received KCL 40 mEq PO x 1 in ED  -  Stable    CKD (chronic kidney disease)  Creatine stable for now. BMP reviewed- noted Estimated Creatinine Clearance: 36.2 mL/min (based on SCr of 1 mg/dL). according to latest data. Based on current GFR, CKD stage is stage 4 - GFR 15-29.  Monitor UOP and serial BMP and adjust therapy as needed. Renally dose meds. Avoid nephrotoxic medications and procedures.  -Creatinine 1.5 on admission however 3 months ago was 1.2    Recent Labs     01/09/25  0508 01/10/25  0531 01/11/25  0557   CREATININE 0.9 0.9 1.0       -Due to GFR, will need to dc ranexa for now and decrease dose of gabapentin  -Hold spironolactone   -Continue Entresto   -Monitor creatinine  -stable    Chronic systolic heart failure  Patient has Diastolic (HFpEF) heart failure that is Chronic. On presentation their CHF was well compensated. Most recent BNP and echo results are listed below.  No results for input(s): "BNP" in the last 72 hours.    Latest ECHO  Results for orders placed during the hospital encounter of 06/02/23    Echo    Interpretation Summary  · The left ventricle is normal in size with concentric hypertrophy and low normal systolic function.  · The estimated ejection fraction is 50%.  · Normal left ventricular diastolic function.  · There is abnormal septal wall motion consistent with left bundle branch block.  · Normal right " ventricular size with normal right ventricular systolic function.  · Normal central venous pressure (3 mmHg).  · The estimated PA systolic pressure is 30 mmHg.  · Mild mitral regurgitation.    Current Heart Failure Medications  sacubitriL-valsartan 24-26 mg per tablet 1 tablet, 2 times daily, Oral  furosemide tablet 20 mg, Daily, Oral    Plan  - Monitor strict I&Os and daily weights.    - Place on telemetry  - Low sodium diet  - Place on fluid restriction of 1.5 L.   - Cardiology has not been consulted  - The patient's volume status is at their baseline  - Repeat ECHO, EF improved  -stop IV Lasix, resume home dose p.o. Lasix           Mixed hyperlipidemia  Lab Results   Component Value Date    CHOL 112 (L) 07/12/2024    HDL 41 07/12/2024    LDLCALC 49.8 (L) 07/12/2024    TRIG 106 07/12/2024     -Continue Statin    Normocytic anemia  Anemia is likely due to chronic disease due to Chronic Kidney Disease. Most recent hemoglobin and hematocrit are listed below.  Recent Labs     01/08/25  0604 01/09/25  0508 01/10/25  0531   HGB 12.9 11.8* 12.9   HCT 42.3 39.5 42.4       Plan  - Monitor serial CBC: Daily  - Transfuse PRBC if patient becomes hemodynamically unstable, symptomatic or H/H drops below 7/21.  - Patient has not received any PRBC transfusions to date  - Patient's anemia is currently stable    Seizure  Hx of seizures, patient has not had a seizure during this admission.     -Continue Keppra  -Seizure precautions      Coronary artery disease of native artery of native heart with stable angina pectoris  Patient with known CAD, which is controlled Will continue Plavix and Statin and monitor for S/Sx of angina/ACS. Continue to monitor on telemetry.     UIP (usual interstitial pneumonitis)  Pulm fibrosis hx- followed by pulmonology outpatient  -Will need close pulm f/u after discharge  -  Patient and family considering admission to hospice,  family able to meet on Monday        GERD (gastroesophageal reflux  disease)  -Continue PPI        VTE Risk Mitigation (From admission, onward)           Ordered     enoxaparin injection 40 mg  Every 24 hours         01/11/25 1203                    Discharge Planning   KATHERINE:      Code Status: Full Code   Medical Readiness for Discharge Date:   Discharge Plan A: Other (Pending Progression)   Discharge Delays: None known at this time            Please place Justification for DME        Everardo Graf MD  Department of Hospital Medicine   [facility] - Telemetry ([facility])

## 2025-01-13 PROBLEM — Z51.5 ENCOUNTER FOR PALLIATIVE CARE: Status: ACTIVE | Noted: 2025-01-13

## 2025-01-13 LAB
ALBUMIN SERPL BCP-MCNC: 2.5 G/DL (ref 3.5–5.2)
ALP SERPL-CCNC: 49 U/L (ref 40–150)
ALT SERPL W/O P-5'-P-CCNC: 24 U/L (ref 10–44)
ANION GAP SERPL CALC-SCNC: 6 MMOL/L (ref 8–16)
AST SERPL-CCNC: 17 U/L (ref 10–40)
BASOPHILS # BLD AUTO: 0.02 K/UL (ref 0–0.2)
BASOPHILS NFR BLD: 0.2 % (ref 0–1.9)
BILIRUB SERPL-MCNC: 0.3 MG/DL (ref 0.1–1)
BUN SERPL-MCNC: 77 MG/DL (ref 8–23)
CALCIUM SERPL-MCNC: 9.9 MG/DL (ref 8.7–10.5)
CHLORIDE SERPL-SCNC: 99 MMOL/L (ref 95–110)
CO2 SERPL-SCNC: 27 MMOL/L (ref 23–29)
CREAT SERPL-MCNC: 1.2 MG/DL (ref 0.5–1.4)
DIFFERENTIAL METHOD BLD: ABNORMAL
EOSINOPHIL # BLD AUTO: 0.2 K/UL (ref 0–0.5)
EOSINOPHIL NFR BLD: 1.7 % (ref 0–8)
ERYTHROCYTE [DISTWIDTH] IN BLOOD BY AUTOMATED COUNT: 14.3 % (ref 11.5–14.5)
EST. GFR  (NO RACE VARIABLE): 46 ML/MIN/1.73 M^2
GLUCOSE SERPL-MCNC: 168 MG/DL (ref 70–110)
HCT VFR BLD AUTO: 36.7 % (ref 37–48.5)
HGB BLD-MCNC: 11.3 G/DL (ref 12–16)
IMM GRANULOCYTES # BLD AUTO: 0.2 K/UL (ref 0–0.04)
IMM GRANULOCYTES NFR BLD AUTO: 2 % (ref 0–0.5)
LYMPHOCYTES # BLD AUTO: 1.8 K/UL (ref 1–4.8)
LYMPHOCYTES NFR BLD: 18 % (ref 18–48)
MAGNESIUM SERPL-MCNC: 1.4 MG/DL (ref 1.6–2.6)
MCH RBC QN AUTO: 30.8 PG (ref 27–31)
MCHC RBC AUTO-ENTMCNC: 30.8 G/DL (ref 32–36)
MCV RBC AUTO: 100 FL (ref 82–98)
MONOCYTES # BLD AUTO: 1.2 K/UL (ref 0.3–1)
MONOCYTES NFR BLD: 11.7 % (ref 4–15)
NEUTROPHILS # BLD AUTO: 6.7 K/UL (ref 1.8–7.7)
NEUTROPHILS NFR BLD: 66.4 % (ref 38–73)
NRBC BLD-RTO: 0 /100 WBC
PHOSPHATE SERPL-MCNC: 3 MG/DL (ref 2.7–4.5)
PLATELET # BLD AUTO: 130 K/UL (ref 150–450)
PMV BLD AUTO: 10.9 FL (ref 9.2–12.9)
POTASSIUM SERPL-SCNC: 4.2 MMOL/L (ref 3.5–5.1)
PROT SERPL-MCNC: 5.4 G/DL (ref 6–8.4)
RBC # BLD AUTO: 3.67 M/UL (ref 4–5.4)
SODIUM SERPL-SCNC: 132 MMOL/L (ref 136–145)
WBC # BLD AUTO: 10.1 K/UL (ref 3.9–12.7)

## 2025-01-13 PROCEDURE — 25000003 PHARM REV CODE 250: Performed by: STUDENT IN AN ORGANIZED HEALTH CARE EDUCATION/TRAINING PROGRAM

## 2025-01-13 PROCEDURE — 25000003 PHARM REV CODE 250: Performed by: NURSE PRACTITIONER

## 2025-01-13 PROCEDURE — 99497 ADVNCD CARE PLAN 30 MIN: CPT | Mod: 25,,, | Performed by: NURSE PRACTITIONER

## 2025-01-13 PROCEDURE — 80053 COMPREHEN METABOLIC PANEL: CPT | Performed by: STUDENT IN AN ORGANIZED HEALTH CARE EDUCATION/TRAINING PROGRAM

## 2025-01-13 PROCEDURE — 84100 ASSAY OF PHOSPHORUS: CPT | Performed by: STUDENT IN AN ORGANIZED HEALTH CARE EDUCATION/TRAINING PROGRAM

## 2025-01-13 PROCEDURE — 99223 1ST HOSP IP/OBS HIGH 75: CPT | Mod: ,,, | Performed by: NURSE PRACTITIONER

## 2025-01-13 PROCEDURE — 63600175 PHARM REV CODE 636 W HCPCS: Performed by: NURSE PRACTITIONER

## 2025-01-13 PROCEDURE — 27000207 HC ISOLATION

## 2025-01-13 PROCEDURE — 21400001 HC TELEMETRY ROOM

## 2025-01-13 PROCEDURE — 83735 ASSAY OF MAGNESIUM: CPT | Performed by: STUDENT IN AN ORGANIZED HEALTH CARE EDUCATION/TRAINING PROGRAM

## 2025-01-13 PROCEDURE — 25000003 PHARM REV CODE 250: Performed by: FAMILY MEDICINE

## 2025-01-13 PROCEDURE — 85025 COMPLETE CBC W/AUTO DIFF WBC: CPT | Performed by: STUDENT IN AN ORGANIZED HEALTH CARE EDUCATION/TRAINING PROGRAM

## 2025-01-13 PROCEDURE — 94761 N-INVAS EAR/PLS OXIMETRY MLT: CPT

## 2025-01-13 PROCEDURE — 99900035 HC TECH TIME PER 15 MIN (STAT)

## 2025-01-13 PROCEDURE — 36415 COLL VENOUS BLD VENIPUNCTURE: CPT | Performed by: STUDENT IN AN ORGANIZED HEALTH CARE EDUCATION/TRAINING PROGRAM

## 2025-01-13 PROCEDURE — 27000221 HC OXYGEN, UP TO 24 HOURS

## 2025-01-13 RX ADMIN — METOPROLOL TARTRATE 50 MG: 50 TABLET, FILM COATED ORAL at 08:01

## 2025-01-13 RX ADMIN — ACETAMINOPHEN 650 MG: 325 TABLET ORAL at 12:01

## 2025-01-13 RX ADMIN — PANTOPRAZOLE SODIUM 40 MG: 40 TABLET, DELAYED RELEASE ORAL at 09:01

## 2025-01-13 RX ADMIN — ATORVASTATIN CALCIUM 40 MG: 40 TABLET, FILM COATED ORAL at 08:01

## 2025-01-13 RX ADMIN — Medication 6 MG: at 12:01

## 2025-01-13 RX ADMIN — MORPHINE SULFATE 2 MG: 2 INJECTION, SOLUTION INTRAMUSCULAR; INTRAVENOUS at 04:01

## 2025-01-13 RX ADMIN — GUAIFENESIN AND DEXTROMETHORPHAN HYDROBROMIDE 1 TABLET: 600; 30 TABLET, EXTENDED RELEASE ORAL at 08:01

## 2025-01-13 RX ADMIN — LEVETIRACETAM 500 MG: 500 TABLET, FILM COATED ORAL at 08:01

## 2025-01-13 RX ADMIN — OXYCODONE HYDROCHLORIDE AND ACETAMINOPHEN 500 MG: 500 TABLET ORAL at 08:01

## 2025-01-13 RX ADMIN — ESCITALOPRAM OXALATE 20 MG: 10 TABLET, FILM COATED ORAL at 08:01

## 2025-01-13 RX ADMIN — ALPRAZOLAM 0.25 MG: 0.25 TABLET ORAL at 09:01

## 2025-01-13 RX ADMIN — OXYCODONE HYDROCHLORIDE AND ACETAMINOPHEN 500 MG: 500 TABLET ORAL at 09:01

## 2025-01-13 RX ADMIN — CLOPIDOGREL BISULFATE 75 MG: 75 TABLET ORAL at 08:01

## 2025-01-13 RX ADMIN — BENZONATATE 100 MG: 100 CAPSULE ORAL at 08:01

## 2025-01-13 RX ADMIN — GABAPENTIN 200 MG: 100 CAPSULE ORAL at 02:01

## 2025-01-13 RX ADMIN — MONTELUKAST 10 MG: 10 TABLET, FILM COATED ORAL at 08:01

## 2025-01-13 RX ADMIN — PANTOPRAZOLE SODIUM 40 MG: 40 TABLET, DELAYED RELEASE ORAL at 08:01

## 2025-01-13 RX ADMIN — LEVETIRACETAM 500 MG: 500 TABLET, FILM COATED ORAL at 09:01

## 2025-01-13 RX ADMIN — ALPRAZOLAM 0.25 MG: 0.25 TABLET ORAL at 08:01

## 2025-01-13 RX ADMIN — THERA TABS 1 TABLET: TAB at 08:01

## 2025-01-13 RX ADMIN — SACUBITRIL AND VALSARTAN 1 TABLET: 24; 26 TABLET, FILM COATED ORAL at 09:01

## 2025-01-13 RX ADMIN — FLUTICASONE PROPIONATE 100 MCG: 50 SPRAY, METERED NASAL at 08:01

## 2025-01-13 RX ADMIN — GABAPENTIN 200 MG: 100 CAPSULE ORAL at 08:01

## 2025-01-13 RX ADMIN — FUROSEMIDE 20 MG: 20 TABLET ORAL at 08:01

## 2025-01-13 RX ADMIN — HYDROCODONE BITARTRATE AND ACETAMINOPHEN 1 TABLET: 5; 325 TABLET ORAL at 05:01

## 2025-01-13 RX ADMIN — ALPRAZOLAM 0.25 MG: 0.25 TABLET ORAL at 02:01

## 2025-01-13 RX ADMIN — ENOXAPARIN SODIUM 40 MG: 40 INJECTION SUBCUTANEOUS at 04:01

## 2025-01-13 RX ADMIN — BENZONATATE 100 MG: 100 CAPSULE ORAL at 02:01

## 2025-01-13 RX ADMIN — GABAPENTIN 200 MG: 100 CAPSULE ORAL at 09:01

## 2025-01-13 RX ADMIN — BENZONATATE 100 MG: 100 CAPSULE ORAL at 09:01

## 2025-01-13 RX ADMIN — GUAIFENESIN AND DEXTROMETHORPHAN HYDROBROMIDE 1 TABLET: 600; 30 TABLET, EXTENDED RELEASE ORAL at 09:01

## 2025-01-13 RX ADMIN — SACUBITRIL AND VALSARTAN 1 TABLET: 24; 26 TABLET, FILM COATED ORAL at 08:01

## 2025-01-13 NOTE — ASSESSMENT & PLAN NOTE
Anemia is likely due to chronic disease due to Chronic Kidney Disease. Most recent hemoglobin and hematocrit are listed below.  Recent Labs     01/11/25  0557 01/12/25  0525 01/13/25  0506   HGB 12.1 13.7 11.3*   HCT 38.8 44.6 36.7*       Plan  - Monitor serial CBC: Daily  - Transfuse PRBC if patient becomes hemodynamically unstable, symptomatic or H/H drops below 7/21.  - Patient has not received any PRBC transfusions to date  - Patient's anemia is currently stable

## 2025-01-13 NOTE — HPI
Shireen Felix is a 79 year old female who has  has a past medical history of Abnormal ECG, Acute bronchitis and bronchiolitis, Aneurysm, Anticoagulant long-term use, Arthritis, CAD in native artery, COPD (chronic obstructive pulmonary disease), Diabetes mellitus, Fall, Glaucoma, Hypertension, Seizures, Shingles, and Stroke who presented to Emergency Department for evaluation for cough. Associated symptoms include: congestion, wheezing, and SOB. She was seen by her pulmonologist today, Dr. Sam. Started as sinus pain and drainage and now has progressed to respiratory symptoms. She was seen a few days ago in the ER given prescription for antibiotics steroids but these have not helped much. Today in the office she can barely talk without coughing continually. She is having a difficult time putting more than 3 words together without severe cough paroxysms. ED workup showed Hgb/Hct 11.9/37.0, K+ 3.1, CO2 16, BUN/creatinine 34/1.5. CT chest w/out contrast stable suspected pulmonary fibrosis of the lungs. No acute infiltrates. She has received DuoNeb x 1, solumedrol 125 mg IV x 1, KCL 40 mEq PO x 1 and NS 1L bolus. Pt admitted for COVID.      Overview/Hospital Course:  Admitted to Hospital Medicine for acute on chronic respiratory failure concerns in setting of COVID-19.  Started on IV methylprednisolone, remdesivir, scheduled respiratory treatments. 01/04: Reported left lower quadrant abdominal pain with tenderness to palpation, will evaluate via CT as patient is on therapeutic anticoagulation for COVID-19.  CT abdomen/pelvis nonacute. 01/05:  Reported improvements in respiratory status with decreased frequency of cough and dyspnea but still reporting significant symptoms from baseline.  On 1/6, patient had a significant event with acute tachypnea, tachycardia. Pulmonology consulted, orders adjusted. Patient stable overnight, had another event on 1/7 similar to previous day. Possible exacerbation related to  anxiety provoked by medications and congestion. Case reviewed, orders adjusted. Per Pulmonology, due to underlying lung fibrosis, patient is likely not benefiting from bronchodilators. Stopped on 1/7, patient did not experience any further episodes of anxiety, respiratory distress. Will continue xanax and prn morphine. Patient with epistaxis, small clots in tissue noted. Reduced supplemental O2 to 1L at rest, stopped full dose lovenox and placed on ppx dose. Hgb stable.   Difficulty with discharge plan, patient originally planned to d/c to SNF, now prefers to d/c home.  Pulmonology discussed home palliative care vs hospice.  Patient and family wish to discuss the Services further.  Per discussion with patient, family will be available for an informational visit on Monday.  Educated patient on results of oxygen evaluation, advised patient to reduce oxygen to 2 L or less at rest, requires 4 L with exertion.  As clinically indicated if significant improvement noted will repeat oxygen evaluation.     1/13/25  NAEON. SOB stable on 2L (at rest). Unable to participate with PT over weekend. Repeat desat study on Monday. Patient and family wants to try and go home.

## 2025-01-13 NOTE — PLAN OF CARE
01/13/25 1139   Post-Acute Status   Post-Acute Authorization Hospice   Hospice Status Referrals Sent   Coverage [employer]   Discharge Delays None known at this time   Discharge Plan   Discharge Plan A Inpatient Hospice       Per Augustus, with [facility], Patient's daughter reached out to him, inquiring and requesting IP bed at the [facility], Inpatient Hospice. Per Augustus, he is going meet with the family at bedside to sign consents. SW inquired if they have a bed available to accept Patient today. Per Augustus, they do have a bed and can DC today.   SW sent secure chat to Attending, letting him know of family's wishes for GIP today.     11 56 Per Augustus, with [facility], he meet with patient and daughter at bedside to discuss IP Hospice. Per Augustus, Patient wishes to speak with brother and sister prior to signing consents. Augustus stated he will go get lunch, to give family time to speak and then will return for Hospice consents.     13 41 Per Augustus, Patient's daughter Ariana, had reached out and spoken with Gwendolyn ([facility] liaison) and Jewell ([facility] DON). Per Augustus, daughter Ariana was on board with Hospice and wished for someone to meet with Patient and daughter, Trista, at bedside. Attending and Augustus spoke with CM, regarding Patient's care. Patient and family expressed to attending and Palliative Care, that they did not wish for Hospice at this time and wished to continue treatment. Per Augustus, Patient's daughter was the one who initially reached out this morning.   Attending returned to patient's room to discuss Hospice vs treatment. Per Attending, Patient and family wish to wait to see if Patient's condition improves prior to signing on with Hospice services. Attending stated patient states she feels better than the last few days and wants to see if she continues to improve. Augustus and PARTHA verbalized understanding.     SW will continue to follow and assist as needed.

## 2025-01-13 NOTE — PT/OT/SLP PROGRESS
Occupational Therapy      Patient Name:  Shireen Felix   MRN:  [id number]    Patient not seen today secondary to nursing HOLD- per Crystal, patient and family discussing palliative care at time of treatment attempt @1305- patient also desaturating with minimal movement at this time. Will continue efforts.    Socorro Ellis MOT, LOTR    1/13/2025

## 2025-01-13 NOTE — PT/OT/SLP PROGRESS
"Physical Therapy      Patient Name:  Shireen Fleix   MRN:  [id number]    Chart review performed but patient not seen today secondary to Bedrest/nsg hold "she's resting now" with episodes of O2 desaturations. Also palliative care consulted.     "

## 2025-01-13 NOTE — PROGRESS NOTES
[facility] Medicine  Progress Note    Patient Name: Shireen Felix  MRN: [id number]  Patient Class: IP- Inpatient   Admission Date: 12/31/2024  Length of Stay: 12 days  Attending Physician: Kristopher Wynn MD  Primary Care Provider: Laron Chaudhari MD        Subjective     Principal Problem:COVID-19        HPI:  Shireen Felix is a 79 year old female who has  has a past medical history of Abnormal ECG, Acute bronchitis and bronchiolitis, Aneurysm, Anticoagulant long-term use, Arthritis, CAD in native artery, COPD (chronic obstructive pulmonary disease), Diabetes mellitus, Fall, Glaucoma, Hypertension, Seizures, Shingles, and Stroke who presented to Emergency Department for evaluation for cough. Associated symptoms include: congestion, wheezing, and SOB. She was seen by her pulmonologist today, Dr. Sam. Started as sinus pain and drainage and now has progressed to respiratory symptoms. She was seen a few days ago in the ER given prescription for antibiotics steroids but these have not helped much. Today in the office she can barely talk without coughing continually. She is having a difficult time putting more than 3 words together without severe cough paroxysms. ED workup showed Hgb/Hct 11.9/37.0, K+ 3.1, CO2 16, BUN/creatinine 34/1.5. CT chest w/out contrast stable suspected pulmonary fibrosis of the lungs. No acute infiltrates. She has received DuoNeb x 1, solumedrol 125 mg IV x 1, KCL 40 mEq PO x 1 and NS 1L bolus. Pt admitted for COVID.     Overview/Hospital Course:  Admitted to Hospital Medicine for acute on chronic respiratory failure concerns in setting of COVID-19.  Started on IV methylprednisolone, remdesivir, scheduled respiratory treatments. 01/04: Reported left lower quadrant abdominal pain with tenderness to palpation, will evaluate via CT as patient is on therapeutic anticoagulation for COVID-19.  CT abdomen/pelvis nonacute. 01/05:  Reported improvements in  respiratory status with decreased frequency of cough and dyspnea but still reporting significant symptoms from baseline.  On 1/6, patient had a significant event with acute tachypnea, tachycardia. Pulmonology consulted, orders adjusted. Patient stable overnight, had another event on 1/7 similar to previous day. Possible exacerbation related to anxiety provoked by medications and congestion. Case reviewed, orders adjusted. Per Pulmonology, due to underlying lung fibrosis, patient is likely not benefiting from bronchodilators. Stopped on 1/7, patient did not experience any further episodes of anxiety, respiratory distress. Will continue xanax and prn morphine. Patient with epistaxis, small clots in tissue noted. Reduced supplemental O2 to 1L at rest, stopped full dose lovenox and placed on ppx dose. Hgb stable.   Difficulty with discharge plan, patient originally planned to d/c to SNF, now prefers to d/c home.  Pulmonology discussed home palliative care vs hospice.  Patient and family wish to discuss the Services further.  Per discussion with patient, family will be available for an informational visit on Monday.  Educated patient on results of oxygen evaluation, advised patient to reduce oxygen to 2 L or less at rest, requires 4 L with exertion.  As clinically indicated if significant improvement noted will repeat oxygen evaluation.     01/12/2025  O2 sats stable on 2L (while at rest). Patient and family wants to try and go home, but unable to participate with PT over weekend. Repeat desat study on Monday. May have to consider palliative measures if progress plateaus.      01/13/2025  Feeling slightly better today than she felt a few days ago but overall still significantly short of breath with exertion.  Discussed with her, palliative Care, and her family.  She does not want to change to hospice at this time.  She wants to try to get better.  She does want to be DNR though.  Appreciate further palliative care  involvement.  Still desaturating significantly with exertion and unable to work with PT because of this        Review of Systems   Constitutional:  Negative for fatigue and fever.   HENT: Negative.     Respiratory:  Positive for chest tightness, shortness of breath and wheezing.    Cardiovascular:  Negative for chest pain and leg swelling.   Gastrointestinal:  Negative for abdominal pain, constipation, diarrhea and nausea.   Genitourinary:  Negative for difficulty urinating and dysuria.   Musculoskeletal: Negative.    Skin:  Negative for rash.   Neurological:  Negative for light-headedness and headaches.   Hematological:  Does not bruise/bleed easily.   Psychiatric/Behavioral:  Negative for agitation and behavioral problems.      Objective:     Vital Signs (Most Recent):  Temp: 97.6 °F (36.4 °C) (01/13/25 1346)  Pulse: 81 (01/13/25 1346)  Resp: 16 (01/13/25 1346)  BP: (!) 98/54 (01/13/25 1346)  SpO2: 98 % (01/13/25 1346) Vital Signs (24h Range):  Temp:  [97.6 °F (36.4 °C)-101.1 °F (38.4 °C)] 97.6 °F (36.4 °C)  Pulse:  [66-89] 81  Resp:  [16-18] 16  SpO2:  [74 %-100 %] 98 %  BP: ()/(47-69) 98/54     Weight: 53.4 kg (117 lb 11.6 oz)  Body mass index is 20.21 kg/m².    Intake/Output Summary (Last 24 hours) at 1/13/2025 1413  Last data filed at 1/13/2025 1200  Gross per 24 hour   Intake 701 ml   Output 1400 ml   Net -699 ml         Physical Exam  Constitutional:       General: She is not in acute distress.     Comments: Cachectic, ill appearing   HENT:      Head: Normocephalic and atraumatic.      Mouth/Throat:      Mouth: Mucous membranes are moist.      Pharynx: Oropharynx is clear.   Eyes:      General: No scleral icterus.  Cardiovascular:      Rate and Rhythm: Normal rate and regular rhythm.      Heart sounds: No murmur heard.     No gallop.   Pulmonary:      Breath sounds: Wheezing present.      Comments: Expiratory wheezing throughout, mild  Abdominal:      General: Bowel sounds are normal. There is no  distension.      Tenderness: There is no abdominal tenderness.   Musculoskeletal:      Right lower leg: No edema.      Left lower leg: No edema.   Skin:     General: Skin is warm and dry.   Neurological:      Mental Status: She is alert and oriented to person, place, and time. Mental status is at baseline.   Psychiatric:         Mood and Affect: Mood normal.         Behavior: Behavior normal.             Significant Labs: All pertinent labs within the past 24 hours have been reviewed.    Significant Imaging: I have reviewed all pertinent imaging results/findings within the past 24 hours.    Assessment and Plan     * COVID-19  Patient is identified as Severe COVID-19 based on hypoxemia with O2 saturations <94% on room air or on ambulation   Initiate standard COVID protocols; COVID-19 testing ,Infection Control notification  and isolation- respiratory, contact and droplet per protocol    Diagnostics: CBC, CMP, Ferritin, CRP, Troponin, and Portable CXR    Management: Initiate targeted therapy with Remdesivir, 200mg IV x1, followed by 100mg IV daily x5 days total and Anticoagulation, Patient admitted to non-critical care unit- Will initiate full dose anticoagulation with Weight based lovenox 1mg/kg IV q12, stopped on 1/11/25, Maintain oxygen saturations 92-96% via Nasal Cannula  and monitor with continuous/intermittent pulse oximetry. , and Continuous cardiac monitoring.    Encounter for palliative care  Palliative Care is following.  Receiving hydrocodone/acetaminophen p.r.n., morphine p.r.n., alprazolam p.r.n. for respiratory distress with good effect      Epistaxis  Likely secondary to supplemental O2 and full anticoagulation    --Stop full dose lovenox on 1/11  --Recommend humidified O2 when in use  --mupirocin in bilateral nares      Acute on chronic respiratory failure with hypoxia  Patient with Hypoxic Respiratory failure which is Acute on chronic.  she is not on home oxygen. Supplemental oxygen was provided and  noted- Oxygen Concentration (%):  [28] 28    .   Signs/symptoms of respiratory failure include- tachypnea, increased work of breathing, respiratory distress, use of accessory muscles, wheezing, stridor, and lethargy. Contributing diagnoses includes - Interstitial lung disease Labs and images were reviewed. Patient Has recent ABG, which has been reviewed. Will treat underlying causes and adjust management of respiratory failure as follows-     Home Oxygen evaluation performed:  Home Oxygen Evaluation - [facility] - Cardiopulmonary Department      Date Performed: 1/8/2025     1)Patient's Home O2 Sat on room air, while at rest: Room Air SpO2 At Rest: 99 %                             If O2 sats on room air at rest are 88% or below, patient qualifies.  Document O2 liter flow needed in Step 2.  If O2 sats are 89% or above, complete Step 3.        2)         If patient is not ambulated and O2 sats are <88%, what is the O2 liter flow required to meet ordered saturation? Home O2 Eval Comments: pt qualifies for home oxygen     If O2 sats on room air while exercising remain 89% or above patient does not qualify, no further testing needed Document N/A in step 3. If O2 sats on room air while exercising are 88% or below, continue to Step 4.     3)Patient's O2 Sat on room air while exercising: Room Air SpO2 During Ambulation: (!) 78 %                             4)Patient's O2 Sat while exercising on O2: SpO2 During Ambulation on O2: 96 % at Ambulation O2 LPM: 4 LPM                             (Must show improvement from #4 for patients to qualify)    Patient qualifies for Home O2 @4L NC due to exertional dyspnea, hypoxia    Hypokalemia  Patient's most recent potassium results are listed below.   Recent Labs     01/08/25  0604 01/09/25  0508 01/10/25  0531   K 4.3 4.9 4.6       Plan  - Replete potassium per protocol  - Monitor potassium Daily  - Patient's hypokalemia is  being treated. Received KCL 40 mEq PO x 1 in ED  -   "Stable    CKD (chronic kidney disease)  Creatine stable for now. BMP reviewed- noted Estimated Creatinine Clearance: 31.5 mL/min (based on SCr of 1.2 mg/dL). according to latest data. Based on current GFR, CKD stage is stage 4 - GFR 15-29.  Monitor UOP and serial BMP and adjust therapy as needed. Renally dose meds. Avoid nephrotoxic medications and procedures.  -Creatinine 1.5 on admission however 3 months ago was 1.2    Recent Labs     01/11/25  0557 01/12/25  0525 01/13/25  0506   CREATININE 1.0 1.2 1.2         -Due to GFR, will need to dc ranexa for now and decrease dose of gabapentin  -Hold spironolactone   -Continue Entresto   -Monitor creatinine  -stable    Chronic systolic heart failure  Patient has Diastolic (HFpEF) heart failure that is Chronic. On presentation their CHF was well compensated. Most recent BNP and echo results are listed below.  No results for input(s): "BNP" in the last 72 hours.    Latest ECHO  Results for orders placed during the hospital encounter of 06/02/23    Echo    Interpretation Summary  · The left ventricle is normal in size with concentric hypertrophy and low normal systolic function.  · The estimated ejection fraction is 50%.  · Normal left ventricular diastolic function.  · There is abnormal septal wall motion consistent with left bundle branch block.  · Normal right ventricular size with normal right ventricular systolic function.  · Normal central venous pressure (3 mmHg).  · The estimated PA systolic pressure is 30 mmHg.  · Mild mitral regurgitation.    Current Heart Failure Medications  sacubitriL-valsartan 24-26 mg per tablet 1 tablet, 2 times daily, Oral  furosemide tablet 20 mg, Daily, Oral    Plan  - Monitor strict I&Os and daily weights.    - Place on telemetry  - Low sodium diet  - Place on fluid restriction of 1.5 L.   - Cardiology has not been consulted  - The patient's volume status is at their baseline  - Repeat ECHO, EF improved  -stop IV Lasix, resume home dose " p.o. Lasix           Mixed hyperlipidemia  Lab Results   Component Value Date    CHOL 112 (L) 07/12/2024    HDL 41 07/12/2024    LDLCALC 49.8 (L) 07/12/2024    TRIG 106 07/12/2024     -Continue Statin    Normocytic anemia  Anemia is likely due to chronic disease due to Chronic Kidney Disease. Most recent hemoglobin and hematocrit are listed below.  Recent Labs     01/11/25  0557 01/12/25  0525 01/13/25  0506   HGB 12.1 13.7 11.3*   HCT 38.8 44.6 36.7*       Plan  - Monitor serial CBC: Daily  - Transfuse PRBC if patient becomes hemodynamically unstable, symptomatic or H/H drops below 7/21.  - Patient has not received any PRBC transfusions to date  - Patient's anemia is currently stable    Seizure  Hx of seizures, patient has not had a seizure during this admission.     -Continue Keppra  -Seizure precautions      Coronary artery disease of native artery of native heart with stable angina pectoris  Patient with known CAD, which is controlled Will continue Plavix and Statin and monitor for S/Sx of angina/ACS. Continue to monitor on telemetry.     UIP (usual interstitial pneumonitis)  Pulm fibrosis hx- followed by pulmonology outpatient  -Will need close pulm f/u after discharge  -patient and family not interested in hospice at this time, want to try giving more time for improvement  -palliative Care involved        GERD (gastroesophageal reflux disease)  -Continue PPI        VTE Risk Mitigation (From admission, onward)           Ordered     enoxaparin injection 40 mg  Every 24 hours         01/11/25 1203                    Discharge Planning   KATHERINE:      Code Status: DNR   Medical Readiness for Discharge Date:   Discharge Plan A: Inpatient Hospice   Discharge Delays: None known at this time              Kristopher Wynn MD  Department of Hospital Medicine   [facility] - Telemetry ([facility])

## 2025-01-13 NOTE — SUBJECTIVE & OBJECTIVE
Review of Systems   Constitutional:  Negative for fatigue and fever.   HENT: Negative.     Respiratory:  Positive for chest tightness, shortness of breath and wheezing.    Cardiovascular:  Negative for chest pain and leg swelling.   Gastrointestinal:  Negative for abdominal pain, constipation, diarrhea and nausea.   Genitourinary:  Negative for difficulty urinating and dysuria.   Musculoskeletal: Negative.    Skin:  Negative for rash.   Neurological:  Negative for light-headedness and headaches.   Hematological:  Does not bruise/bleed easily.   Psychiatric/Behavioral:  Negative for agitation and behavioral problems.      Objective:     Vital Signs (Most Recent):  Temp: 97.6 °F (36.4 °C) (01/13/25 1346)  Pulse: 81 (01/13/25 1346)  Resp: 16 (01/13/25 1346)  BP: (!) 98/54 (01/13/25 1346)  SpO2: 98 % (01/13/25 1346) Vital Signs (24h Range):  Temp:  [97.6 °F (36.4 °C)-101.1 °F (38.4 °C)] 97.6 °F (36.4 °C)  Pulse:  [66-89] 81  Resp:  [16-18] 16  SpO2:  [74 %-100 %] 98 %  BP: ()/(47-69) 98/54     Weight: 53.4 kg (117 lb 11.6 oz)  Body mass index is 20.21 kg/m².    Intake/Output Summary (Last 24 hours) at 1/13/2025 1413  Last data filed at 1/13/2025 1200  Gross per 24 hour   Intake 701 ml   Output 1400 ml   Net -699 ml         Physical Exam  Constitutional:       General: She is not in acute distress.     Comments: Cachectic, ill appearing   HENT:      Head: Normocephalic and atraumatic.      Mouth/Throat:      Mouth: Mucous membranes are moist.      Pharynx: Oropharynx is clear.   Eyes:      General: No scleral icterus.  Cardiovascular:      Rate and Rhythm: Normal rate and regular rhythm.      Heart sounds: No murmur heard.     No gallop.   Pulmonary:      Breath sounds: Wheezing present.      Comments: Expiratory wheezing throughout, mild  Abdominal:      General: Bowel sounds are normal. There is no distension.      Tenderness: There is no abdominal tenderness.   Musculoskeletal:      Right lower leg: No  edema.      Left lower leg: No edema.   Skin:     General: Skin is warm and dry.   Neurological:      Mental Status: She is alert and oriented to person, place, and time. Mental status is at baseline.   Psychiatric:         Mood and Affect: Mood normal.         Behavior: Behavior normal.             Significant Labs: All pertinent labs within the past 24 hours have been reviewed.    Significant Imaging: I have reviewed all pertinent imaging results/findings within the past 24 hours.

## 2025-01-13 NOTE — PLAN OF CARE
A223/A223 LOYDARoberto Felix is a 79 y.o.female admitted on 12/31/2024 for COVID-19   Code Status: DNR MRN: [id number]   Review of patient's allergies indicates:   Allergen Reactions    Penicillins Anaphylaxis, Hives, Shortness Of Breath and Swelling    Penicillin     Adhesive Rash    Doxycycline Nausea Only    Oxycodone Other (See Comments)     Fatigue      Sulfa (sulfonamide antibiotics) Itching     Past Medical History:   Diagnosis Date    Abnormal ECG 08/05/2019    Acute bronchitis and bronchiolitis 12/31/2024    Aneurysm     Anticoagulant long-term use     Arthritis     CAD in native artery 08/05/2019    Diabetes mellitus     Fall 10/10/2019    Formatting of this note might be different from the original. Found sitting on floor next to bed last night Mild confusion today Does not recall falling or how she ended up on floor UA today Labs yesterday unremarkable    Glaucoma     Hypertension     Pulmonary fibrosis     Seizures     Shingles 05/27/2017    Stroke       PRN meds    acetaminophen, 650 mg, Q6H PRN  albuterol-ipratropium, 3 mL, Q4H PRN  dextrose 50%, 12.5 g, PRN  dextrose 50%, 25 g, PRN  glucagon (human recombinant), 1 mg, PRN  glucose, 16 g, PRN  glucose, 24 g, PRN  HYDROcodone-acetaminophen, 1 tablet, Q6H PRN  melatonin, 6 mg, Nightly PRN  methocarbamoL, 500 mg, TID PRN  morphine, 2 mg, Q4H PRN  ondansetron, 4 mg, Q8H PRN  sodium chloride 0.9%, 10 mL, PRN      Chart check completed. Will continue plan of care.     2LPM saturates well at rest, oxygen demand for activity requires 3-4 LPM with time to recover  Pt and family declined hospice at this time   NEBs PRN   PRN morphine for Air hunger      Orientation: oriented x 4  [place] Coma Scale Score: 15     Lead Monitored: Lead II Rhythm: normal sinus rhythm Frequency/Ectopy: PVCs  Cardiac/Telemetry Box Number: [id number]  VTE Core Measure: Pharmacological prophylaxis initiated/maintained Last Bowel Movement: 01/12/25  Diet Cardiac Standard Tray  Voiding  Characteristics: external catheter  Gilberto Score: 14  Fall Risk Score: 17  Accucheck []   Freq?      Lines/Drains/Airways       Drain  Duration             Female External Urinary Catheter w/ Suction 12/31/24 2230 12 days              Peripheral Intravenous Line  Duration                  Peripheral IV - Single Lumen 01/04/25 1317 22 G Left;Posterior;Proximal Forearm 9 days

## 2025-01-13 NOTE — ASSESSMENT & PLAN NOTE
Impression:  Symptoms:  dyspnea, fatigue, debility  Medicolegal: The pt has decision making capacity.    Psychosocial:  support system consists of two adult daughters and brother  Prognostication: PPS 30%, reasonable prognosis of 6 months or less.   Understanding of disease and Illness Trajectory: Family  has  adequate understanding of her illness, they can benefit from continued education on what to expect in the future.  Goals of care:  Patient and family are hopeful to improve Ms. Felix's level of function so she may return home. We discussed the reality that while she may recover to assist with some ADLs, dyspnea will be limiting and she will need 24 hour/d caregiver support at home.

## 2025-01-13 NOTE — ASSESSMENT & PLAN NOTE
"Patient has Diastolic (HFpEF) heart failure that is Chronic. On presentation their CHF was well compensated. Most recent BNP and echo results are listed below.  No results for input(s): "BNP" in the last 72 hours.    Latest ECHO  Results for orders placed during the hospital encounter of 06/02/23    Echo    Interpretation Summary  · The left ventricle is normal in size with concentric hypertrophy and low normal systolic function.  · The estimated ejection fraction is 50%.  · Normal left ventricular diastolic function.  · There is abnormal septal wall motion consistent with left bundle branch block.  · Normal right ventricular size with normal right ventricular systolic function.  · Normal central venous pressure (3 mmHg).  · The estimated PA systolic pressure is 30 mmHg.  · Mild mitral regurgitation.    Current Heart Failure Medications  sacubitriL-valsartan 24-26 mg per tablet 1 tablet, 2 times daily, Oral  furosemide tablet 20 mg, Daily, Oral    Plan  - Monitor strict I&Os and daily weights.    - Place on telemetry  - Low sodium diet  - Place on fluid restriction of 1.5 L.   - Cardiology has not been consulted  - The patient's volume status is at their baseline  - Repeat ECHO, EF improved  -stop IV Lasix, resume home dose p.o. Lasix         "

## 2025-01-13 NOTE — NURSING
02 saturations At rest pt on 2 LPM saturating well 90% and above, upon exertion pt requires 3-5 LPM to keep sats above 88%.

## 2025-01-13 NOTE — ASSESSMENT & PLAN NOTE
Covid infection on ILD.   Now oxygen dependent, using morphine PRN dyspnea - last dose 1/12 at 6pm.   Discussed GOC, ACP.

## 2025-01-13 NOTE — PLAN OF CARE
A223/A223 OLU Felix is a 79 y.o.female admitted on 12/31/2024 for COVID-19   Code Status: Full Code MRN: [id number]   Review of patient's allergies indicates:   Allergen Reactions    Penicillins Anaphylaxis, Hives, Shortness Of Breath and Swelling    Penicillin     Adhesive Rash    Doxycycline Nausea Only    Oxycodone Other (See Comments)     Fatigue      Sulfa (sulfonamide antibiotics) Itching     Past Medical History:   Diagnosis Date    Abnormal ECG 08/05/2019    Acute bronchitis and bronchiolitis 12/31/2024    Aneurysm     Anticoagulant long-term use     Arthritis     CAD in native artery 08/05/2019    Diabetes mellitus     Fall 10/10/2019    Formatting of this note might be different from the original. Found sitting on floor next to bed last night Mild confusion today Does not recall falling or how she ended up on floor UA today Labs yesterday unremarkable    Glaucoma     Hypertension     Pulmonary fibrosis     Seizures     Shingles 05/27/2017    Stroke       PRN meds    acetaminophen, 650 mg, Q6H PRN  albuterol-ipratropium, 3 mL, Q4H PRN  dextrose 50%, 12.5 g, PRN  dextrose 50%, 25 g, PRN  glucagon (human recombinant), 1 mg, PRN  glucose, 16 g, PRN  glucose, 24 g, PRN  HYDROcodone-acetaminophen, 1 tablet, Q6H PRN  melatonin, 6 mg, Nightly PRN  methocarbamoL, 500 mg, TID PRN  morphine, 2 mg, Q4H PRN  ondansetron, 4 mg, Q8H PRN  sodium chloride 0.9%, 10 mL, PRN      Chart check completed. Will continue plan of care.      Orientation: oriented x 4  Anton Coma Scale Score: 15     Lead Monitored: Lead II Rhythm: normal sinus rhythm Frequency/Ectopy: PVCs  Cardiac/Telemetry Box Number: [id number]  VTE Core Measure: Pharmacological prophylaxis initiated/maintained Last Bowel Movement: 01/12/25  Diet Cardiac Standard Tray  Voiding Characteristics: external catheter  Gilberto Score: 14  Fall Risk Score: 17  Accucheck []   Freq?      Lines/Drains/Airways       Drain  Duration             Female External Urinary  Catheter w/ Suction 12/31/24 0820 12 days              Peripheral Intravenous Line  Duration                  Peripheral IV - Single Lumen 01/04/25 1317 22 G Left;Posterior;Proximal Forearm 8 days

## 2025-01-13 NOTE — ASSESSMENT & PLAN NOTE
Creatine stable for now. BMP reviewed- noted Estimated Creatinine Clearance: 31.5 mL/min (based on SCr of 1.2 mg/dL). according to latest data. Based on current GFR, CKD stage is stage 4 - GFR 15-29.  Monitor UOP and serial BMP and adjust therapy as needed. Renally dose meds. Avoid nephrotoxic medications and procedures.  -Creatinine 1.5 on admission however 3 months ago was 1.2    Recent Labs     01/11/25  0557 01/12/25  0525 01/13/25  0506   CREATININE 1.0 1.2 1.2         -Due to GFR, will need to dc ranexa for now and decrease dose of gabapentin  -Hold spironolactone   -Continue Entresto   -Monitor creatinine  -stable

## 2025-01-13 NOTE — ASSESSMENT & PLAN NOTE
Pulm fibrosis hx- followed by pulmonology outpatient  -Will need close pulm f/u after discharge  -patient and family not interested in hospice at this time, want to try giving more time for improvement  -palliative Care involved

## 2025-01-13 NOTE — SUBJECTIVE & OBJECTIVE
Interval History: Sitting in bed with Dr danielle at bedside.  Very fatigued, falls asleep multiple times during conversation. No dyspnea at rest and with O2.     Past Medical History:   Diagnosis Date    Abnormal ECG 08/05/2019    Acute bronchitis and bronchiolitis 12/31/2024    Aneurysm     Anticoagulant long-term use     Arthritis     CAD in native artery 08/05/2019    Diabetes mellitus     Fall 10/10/2019    Formatting of this note might be different from the original. Found sitting on floor next to bed last night Mild confusion today Does not recall falling or how she ended up on floor UA today Labs yesterday unremarkable    Glaucoma     Hypertension     Pulmonary fibrosis     Seizures     Shingles 05/27/2017    Stroke        Past Surgical History:   Procedure Laterality Date    BRAIN SURGERY      CARDIAC CATHETERIZATION      CATHETERIZATION OF BOTH LEFT AND RIGHT HEART N/A 5/17/2024    Procedure: CATHETERIZATION, HEART, BOTH LEFT AND RIGHT;  Surgeon: Rachelle Sarabia MD;  Location: [facility] CATH LAB;  Service: Cardiology;  Laterality: N/A;    COLONOSCOPY N/A 09/21/2023    Procedure: COLONOSCOPY;  Surgeon: Joanna Leal MD;  Location: [facility] ENDO;  Service: Endoscopy;  Laterality: N/A;    CORONARY ANGIOPLASTY      EPIDURAL STEROID INJECTION INTO CERVICAL SPINE N/A 2/7/2024    Procedure: Cervical IL XANDER, Level C6/7, RN IV sedation;  Surgeon: Francisco Oshea MD;  Location: [facility] PAIN MGT;  Service: Pain Management;  Laterality: N/A;    EPIDURAL STEROID INJECTION INTO LUMBAR SPINE Bilateral 11/12/2019    Procedure: TF XANDER L4/5;  Surgeon: Desean Dias MD;  Location: [facility] PAIN MGT;  Service: Pain Management;  Laterality: Bilateral;    HYSTERECTOMY      INJECTION OF ANESTHETIC AGENT AROUND MEDIAL BRANCH NERVES INNERVATING LUMBAR FACET JOINT Bilateral 01/31/2020    Procedure: Bilateral L3-5 MBB;  Surgeon: Desean Dias MD;  Location: [facility] PAIN MGT;  Service: Pain Management;  Laterality: Bilateral;     INJECTION OF ANESTHETIC AGENT INTO SACROILIAC JOINT Right 11/16/2021    Procedure: Right BLOCK, SACROILIAC JOINT Right GTB with RN IV sedation;  Surgeon: Yao Fluton MD;  Location: [facility] PAIN MGT;  Service: Pain Management;  Laterality: Right;    INJECTION OF ANESTHETIC AGENT INTO SACROILIAC JOINT Bilateral 07/28/2023    Procedure: Bilateral GT bursa + bilateral SIJ injection;  Surgeon: Francisco Oshea MD;  Location: [facility] PAIN MGT;  Service: Pain Management;  Laterality: Bilateral;    INJECTION OF JOINT Bilateral 07/09/2020    Procedure: Bilateral shoulder GH Joint injection with local;  Surgeon: Desean Dias MD;  Location: [facility] PAIN MGT;  Service: Pain Management;  Laterality: Bilateral;    INJECTION OF JOINT Bilateral 07/28/2023    Procedure: Bilateral GT bursa + bilateral SIJ injection NEEDS ADDITIONAL SEDATION AND MORE TIME BEFORE INJECTION RN IV Sedation;  Surgeon: Francisco Oshea MD;  Location: [facility] PAIN MGT;  Service: Pain Management;  Laterality: Bilateral;    INJECTION OF JOINT N/A 10/25/2023    Procedure: Sacrococcygeal joint injection;  Surgeon: Francisco Oshea MD;  Location: [facility] PAIN MGT;  Service: Pain Management;  Laterality: N/A;    OOPHORECTOMY      SELECTIVE INJECTION OF ANESTHETIC AGENT AROUND LUMBAR SPINAL NERVE ROOT BY TRANSFORAMINAL APPROACH Bilateral 04/26/2023    Procedure: Bilateral L4/5 TF XANDER RN IV Sedation;  Surgeon: Francisco Oshea MD;  Location: [facility] PAIN MGT;  Service: Pain Management;  Laterality: Bilateral;    TRANSFORAMINAL EPIDURAL INJECTION OF STEROID Bilateral 07/23/2019    Procedure: Bilateral L3/4 Transforaminal Epidural Steroid Injection;  Surgeon: Desean Dias MD;  Location: [facility] PAIN MGT;  Service: Pain Management;  Laterality: Bilateral;    TRANSFORAMINAL EPIDURAL INJECTION OF STEROID Bilateral 03/10/2020    Procedure: Right T12/L1 TF XANDER with local;  Surgeon: Desean Dias MD;  Location: [facility] PAIN MGT;  Service: Pain Management;  Laterality: Bilateral;     TRANSFORAMINAL EPIDURAL INJECTION OF STEROID Right 06/19/2020    Procedure: Right T12/L1 TFESI Covid day of procedure;  Surgeon: Desean Dias MD;  Location: [facility];  Service: Pain Management;  Laterality: Right;       Review of patient's allergies indicates:   Allergen Reactions    Penicillins Anaphylaxis, Hives, Shortness Of Breath and Swelling    Penicillin     Adhesive Rash    Doxycycline Nausea Only    Oxycodone Other (See Comments)     Fatigue      Sulfa (sulfonamide antibiotics) Itching       Medications:  Continuous Infusions:  Scheduled Meds:   ALPRAZolam  0.25 mg Oral TID    ascorbic acid (vitamin C)  500 mg Oral BID    atorvastatin  40 mg Oral Daily    benzonatate  100 mg Oral TID    clopidogreL  75 mg Oral Daily    dextromethorphan-guaiFENesin  mg  1 tablet Oral BID    enoxparin  40 mg Subcutaneous Q24H (prophylaxis, 1700)    EScitalopram oxalate  20 mg Oral Daily    fluticasone propionate  2 spray Each Nostril Daily    furosemide  20 mg Oral Daily    gabapentin  200 mg Oral TID    levETIRAcetam  500 mg Oral BID    metoprolol tartrate  50 mg Oral BID    montelukast  10 mg Oral Daily    multivitamin  1 tablet Oral Daily    pantoprazole  40 mg Oral BID    polyethylene glycol  17 g Oral Daily    sacubitriL-valsartan  1 tablet Oral BID     PRN Meds:  Current Facility-Administered Medications:     acetaminophen, 650 mg, Oral, Q6H PRN    albuterol-ipratropium, 3 mL, Nebulization, Q4H PRN    dextrose 50%, 12.5 g, Intravenous, PRN    dextrose 50%, 25 g, Intravenous, PRN    glucagon (human recombinant), 1 mg, Intramuscular, PRN    glucose, 16 g, Oral, PRN    glucose, 24 g, Oral, PRN    HYDROcodone-acetaminophen, 1 tablet, Oral, Q6H PRN    melatonin, 6 mg, Oral, Nightly PRN    methocarbamoL, 500 mg, Oral, TID PRN    morphine, 2 mg, Intravenous, Q4H PRN    ondansetron, 4 mg, Intravenous, Q8H PRN    sodium chloride 0.9%, 10 mL, Intravenous, PRN    Family History       Problem Relation (Age of Onset)     Breast cancer Maternal Aunt, Maternal Aunt, Maternal Aunt    No Known Problems Mother, Father          Tobacco Use    Smoking status: Former     Current packs/day: 0.00     Average packs/day: 1 pack/day for 42.3 years (42.3 ttl pk-yrs)     Types: Cigarettes     Start date:      Quit date: 2004     Years since quittin.7    Smokeless tobacco: Former   Substance and Sexual Activity    Alcohol use: No    Drug use: Never    Sexual activity: Not Currently     Partners: Male       Review of Systems  Objective:     Vital Signs (Most Recent):  Temp: 98.9 °F (37.2 °C) (25 08)  Pulse: 76 (25 1132)  Resp: 18 (25 08)  BP: 111/69 (25 08)  SpO2: (!) 90 % (25) Vital Signs (24h Range):  Temp:  [97.2 °F (36.2 °C)-101.1 °F (38.4 °C)] 98.9 °F (37.2 °C)  Pulse:  [66-89] 76  Resp:  [16-18] 18  SpO2:  [74 %-100 %] 90 %  BP: ()/(48-69) 111/69     Weight: 53.4 kg (117 lb 11.6 oz)  Body mass index is 20.21 kg/m².       Physical Exam  Constitutional:       General: She is not in acute distress.     Appearance: She is ill-appearing.   HENT:      Mouth/Throat:      Mouth: Mucous membranes are moist.   Eyes:      General: No scleral icterus.  Pulmonary:      Effort: Pulmonary effort is normal.   Neurological:      Mental Status: She is alert.      Comments: Falls asleep during exam   Psychiatric:         Mood and Affect: Mood normal.         Behavior: Behavior normal.         Thought Content: Thought content normal.            Review of Symptoms      Symptom Assessment (ESAS 0-10 Scale)  Unable to complete assessment due to Acuity of condition         Pain Assessment in Advanced Demential Scale (PAINAD)   Breathing - Independent of vocalization:  0  Negative vocalization:  0  Facial expression:  0  Body language:  0  Consolability:  0  Total:  0    Performance Status:  30    Living Arrangements:  Lives alone    Psychosocial/Cultural:   See Palliative Psychosocial Note:  Yes  Unmarried, two adult children and one brother with whom she is close.   **Primary  to Follow**  Palliative Care  Consult: No        Advance Care Planning   Advance Directives:   Living Will: Yes (family will bring copy for record)    [place]: No    Do Not Resuscitate Status: Yes (desires DNR, wants natural and peaceful death)    Medical Power of : No (wants her two adult children to share HC decision making if she is unable)      Decision Making:  Patient answered questions and Family answered questions  Goals of Care: The patient and family endorses that what is most important right now is to focus on remaining as independent as possible and symptom/pain control    Accordingly, we have decided that the best plan to meet the patient's goals includes continuing with treatment         Significant Labs: All pertinent labs within the past 24 hours have been reviewed.  CBC:   Recent Labs   Lab 01/13/25  0506   WBC 10.10   HGB 11.3*   HCT 36.7*   *   *     BMP:  Recent Labs   Lab 01/13/25  0506   *   *   K 4.2   CL 99   CO2 27   BUN 77*   CREATININE 1.2   CALCIUM 9.9   MG 1.4*     LFT:  Lab Results   Component Value Date    AST 17 01/13/2025    ALKPHOS 49 01/13/2025    BILITOT 0.3 01/13/2025     Albumin:   Albumin   Date Value Ref Range Status   01/13/2025 2.5 (L) 3.5 - 5.2 g/dL Final     Protein:   Total Protein   Date Value Ref Range Status   01/13/2025 5.4 (L) 6.0 - 8.4 g/dL Final     Lactic acid:   Lab Results   Component Value Date    LACTATE 1.3 12/31/2024    LACTATE 0.8 06/17/2024       Significant Imaging: I have reviewed all pertinent imaging results/findings within the past 24 hours.

## 2025-01-13 NOTE — CONSULTS
[facility] ([facility])  Palliative Medicine  Consult Note    Patient Name: Shireen Felix  MRN: [id number]  Admission Date: 12/31/2024  Hospital Length of Stay: 12 days  Code Status: Full Code   Attending Provider: Kristopher Wynn MD  Consulting Provider: Alisa Llanes NP  Primary Care Physician: Laron Chaudhari MD  Principal Problem:COVID-19    Patient information was obtained from patient, relative(s), and primary team.      Inpatient consult to Palliative Care  Consult performed by: Alisa Llanes NP  Consult ordered by: Kristopher Wynn MD  Reason for consult: GOC, ACP  Assessment/Recommendations: Thank you for this consult  Ms. Felix desires DNR.   She and her family are discussing palliative care versus hospice.   They are hopeful she will become more independent so she can return home.  We discussed the reality that while she may recover to assist with some ADLs, dyspnea will be limiting and she will need 24 hour/d caregiver support at home.  Our team will follow patient.            Assessment/Plan:     Pulmonary  Acute on chronic respiratory failure with hypoxia  Covid infection on ILD.   Now oxygen dependent, using morphine PRN dyspnea - last dose 1/12 at 6pm.   Discussed GOC, ACP.      ID  * COVID-19  Followed by hospital medicine.   With underlying ILD  Dyspnea persists and is limiting activities    Palliative Care  Encounter for palliative care  Impression:  Symptoms:  dyspnea, fatigue, debility  Medicolegal: The pt has decision making capacity.    Psychosocial:  support system consists of two adult daughters and brother  Prognostication: PPS 30%, reasonable prognosis of 6 months or less.   Understanding of disease and Illness Trajectory: Family  has  adequate understanding of her illness, they can benefit from continued education on what to expect in the future.  Goals of care:  Patient and family are hopeful to improve Ms. Felix's level of function so she may return home. We  discussed the reality that while she may recover to assist with some ADLs, dyspnea will be limiting and she will need 24 hour/d caregiver support at home.            Thank you for your consult.  Our team will follow up with Ms Felix    Subjective:     HPI:   Shireen Felix is a 79 year old female who has  has a past medical history of Abnormal ECG, Acute bronchitis and bronchiolitis, Aneurysm, Anticoagulant long-term use, Arthritis, CAD in native artery, COPD (chronic obstructive pulmonary disease), Diabetes mellitus, Fall, Glaucoma, Hypertension, Seizures, Shingles, and Stroke who presented to Emergency Department for evaluation for cough. Associated symptoms include: congestion, wheezing, and SOB. She was seen by her pulmonologist today, Dr. Sam. Started as sinus pain and drainage and now has progressed to respiratory symptoms. She was seen a few days ago in the ER given prescription for antibiotics steroids but these have not helped much. Today in the office she can barely talk without coughing continually. She is having a difficult time putting more than 3 words together without severe cough paroxysms. ED workup showed Hgb/Hct 11.9/37.0, K+ 3.1, CO2 16, BUN/creatinine 34/1.5. CT chest w/out contrast stable suspected pulmonary fibrosis of the lungs. No acute infiltrates. She has received DuoNeb x 1, solumedrol 125 mg IV x 1, KCL 40 mEq PO x 1 and NS 1L bolus. Pt admitted for COVID.      Overview/Hospital Course:  Admitted to Hospital Medicine for acute on chronic respiratory failure concerns in setting of COVID-19.  Started on IV methylprednisolone, remdesivir, scheduled respiratory treatments. 01/04: Reported left lower quadrant abdominal pain with tenderness to palpation, will evaluate via CT as patient is on therapeutic anticoagulation for COVID-19.  CT abdomen/pelvis nonacute. 01/05:  Reported improvements in respiratory status with decreased frequency of cough and dyspnea but still reporting  significant symptoms from baseline.  On 1/6, patient had a significant event with acute tachypnea, tachycardia. Pulmonology consulted, orders adjusted. Patient stable overnight, had another event on 1/7 similar to previous day. Possible exacerbation related to anxiety provoked by medications and congestion. Case reviewed, orders adjusted. Per Pulmonology, due to underlying lung fibrosis, patient is likely not benefiting from bronchodilators. Stopped on 1/7, patient did not experience any further episodes of anxiety, respiratory distress. Will continue xanax and prn morphine. Patient with epistaxis, small clots in tissue noted. Reduced supplemental O2 to 1L at rest, stopped full dose lovenox and placed on ppx dose. Hgb stable.   Difficulty with discharge plan, patient originally planned to d/c to SNF, now prefers to d/c home.  Pulmonology discussed home palliative care vs hospice.  Patient and family wish to discuss the Services further.  Per discussion with patient, family will be available for an informational visit on Monday.  Educated patient on results of oxygen evaluation, advised patient to reduce oxygen to 2 L or less at rest, requires 4 L with exertion.  As clinically indicated if significant improvement noted will repeat oxygen evaluation.     1/13/25  NAEON. SOB stable on 2L (at rest). Unable to participate with PT over weekend. Repeat desat study on Monday. Patient and family wants to try and go home.      Interval History: Sitting in bed with Dr danielle at bedside.  Very fatigued, falls asleep multiple times during conversation. No dyspnea at rest and with O2.     Past Medical History:   Diagnosis Date    Abnormal ECG 08/05/2019    Acute bronchitis and bronchiolitis 12/31/2024    Aneurysm     Anticoagulant long-term use     Arthritis     CAD in native artery 08/05/2019    Diabetes mellitus     Fall 10/10/2019    Formatting of this note might be different from the original. Found sitting on floor  next to bed last night Mild confusion today Does not recall falling or how she ended up on floor UA today Labs yesterday unremarkable    Glaucoma     Hypertension     Pulmonary fibrosis     Seizures     Shingles 05/27/2017    Stroke        Past Surgical History:   Procedure Laterality Date    BRAIN SURGERY      CARDIAC CATHETERIZATION      CATHETERIZATION OF BOTH LEFT AND RIGHT HEART N/A 5/17/2024    Procedure: CATHETERIZATION, HEART, BOTH LEFT AND RIGHT;  Surgeon: Rachelle Sarabia MD;  Location: [facility] CATH LAB;  Service: Cardiology;  Laterality: N/A;    COLONOSCOPY N/A 09/21/2023    Procedure: COLONOSCOPY;  Surgeon: Joanna Leal MD;  Location: [facility] ENDO;  Service: Endoscopy;  Laterality: N/A;    CORONARY ANGIOPLASTY      EPIDURAL STEROID INJECTION INTO CERVICAL SPINE N/A 2/7/2024    Procedure: Cervical IL XANDER, Level C6/7, RN IV sedation;  Surgeon: Francisco Oshea MD;  Location: [facility] PAIN MGT;  Service: Pain Management;  Laterality: N/A;    EPIDURAL STEROID INJECTION INTO LUMBAR SPINE Bilateral 11/12/2019    Procedure: TF XANDER L4/5;  Surgeon: Desean Dias MD;  Location: [facility] PAIN MGT;  Service: Pain Management;  Laterality: Bilateral;    HYSTERECTOMY      INJECTION OF ANESTHETIC AGENT AROUND MEDIAL BRANCH NERVES INNERVATING LUMBAR FACET JOINT Bilateral 01/31/2020    Procedure: Bilateral L3-5 MBB;  Surgeon: Desean Dias MD;  Location: [facility] PAIN MGT;  Service: Pain Management;  Laterality: Bilateral;    INJECTION OF ANESTHETIC AGENT INTO SACROILIAC JOINT Right 11/16/2021    Procedure: Right BLOCK, SACROILIAC JOINT Right GTB with RN IV sedation;  Surgeon: Yao Fulton MD;  Location: [facility] PAIN MGT;  Service: Pain Management;  Laterality: Right;    INJECTION OF ANESTHETIC AGENT INTO SACROILIAC JOINT Bilateral 07/28/2023    Procedure: Bilateral GT bursa + bilateral SIJ injection;  Surgeon: Francisco Oshea MD;  Location: [facility] PAIN MGT;  Service: Pain Management;  Laterality: Bilateral;    INJECTION OF JOINT  Bilateral 07/09/2020    Procedure: Bilateral shoulder GH Joint injection with local;  Surgeon: Desean Dias MD;  Location: [facility] PAIN MGT;  Service: Pain Management;  Laterality: Bilateral;    INJECTION OF JOINT Bilateral 07/28/2023    Procedure: Bilateral GT bursa + bilateral SIJ injection NEEDS ADDITIONAL SEDATION AND MORE TIME BEFORE INJECTION RN IV Sedation;  Surgeon: Francisco Oshea MD;  Location: [facility] PAIN [facility]GT;  Service: Pain Management;  Laterality: Bilateral;    INJECTION OF JOINT N/A 10/25/2023    Procedure: Sacrococcygeal joint injection;  Surgeon: Francisco Oshea MD;  Location: [facility] PAIN [facility]GT;  Service: Pain Management;  Laterality: N/A;    OOPHORECTOMY      SELECTIVE INJECTION OF ANESTHETIC AGENT AROUND LUMBAR SPINAL NERVE ROOT BY TRANSFORAMINAL APPROACH Bilateral 04/26/2023    Procedure: Bilateral L4/5 TF XANDER RN IV Sedation;  Surgeon: Francisco Oshea MD;  [facility];  Service: Pain Management;  Laterality: Bilateral;    TRANSFORAMINAL EPIDURAL INJECTION OF STEROID Bilateral 07/23/2019    Procedure: Bilateral L3/4 Transforaminal Epidural Steroid Injection;  Surgeon: Desean Dias MD;  [facility]: [facility];  Service: Pain Management;  Laterality: Bilateral;    TRANSFORAMINAL EPIDURAL INJECTION OF STEROID Bilateral 03/10/2020    Procedure: Right T12/L1 TF XANDER with local;  Surgeon: Desean Dias MD;  [facility];  Service: Pain Management;  Laterality: Bilateral;    TRANSFORAMINAL EPIDURAL INJECTION OF STEROID Right 06/19/2020    Procedure: Right T12/L1 TFESI Covid day of procedure;  Surgeon: Desean Dias MD;  [facility];  Service: Pain Management;  Laterality: Right;       Review of patient's allergies indicates:   Allergen Reactions    Penicillins Anaphylaxis, Hives, Shortness Of Breath and Swelling    Penicillin     Adhesive Rash    Doxycycline Nausea Only    Oxycodone Other (See Comments)     Fatigue      Sulfa (sulfonamide antibiotics) Itching        Medications:  Continuous Infusions:  Scheduled Meds:   ALPRAZolam  0.25 mg Oral TID    ascorbic acid (vitamin C)  500 mg Oral BID    atorvastatin  40 mg Oral Daily    benzonatate  100 mg Oral TID    clopidogreL  75 mg Oral Daily    dextromethorphan-guaiFENesin  mg  1 tablet Oral BID    enoxparin  40 mg Subcutaneous Q24H (prophylaxis, 1700)    EScitalopram oxalate  20 mg Oral Daily    fluticasone propionate  2 spray Each Nostril Daily    furosemide  20 mg Oral Daily    gabapentin  200 mg Oral TID    levETIRAcetam  500 mg Oral BID    metoprolol tartrate  50 mg Oral BID    montelukast  10 mg Oral Daily    multivitamin  1 tablet Oral Daily    pantoprazole  40 mg Oral BID    polyethylene glycol  17 g Oral Daily    sacubitriL-valsartan  1 tablet Oral BID     PRN Meds:  Current Facility-Administered Medications:     acetaminophen, 650 mg, Oral, Q6H PRN    albuterol-ipratropium, 3 mL, Nebulization, Q4H PRN    dextrose 50%, 12.5 g, Intravenous, PRN    dextrose 50%, 25 g, Intravenous, PRN    glucagon (human recombinant), 1 mg, Intramuscular, PRN    glucose, 16 g, Oral, PRN    glucose, 24 g, Oral, PRN    HYDROcodone-acetaminophen, 1 tablet, Oral, Q6H PRN    melatonin, 6 mg, Oral, Nightly PRN    methocarbamoL, 500 mg, Oral, TID PRN    morphine, 2 mg, Intravenous, Q4H PRN    ondansetron, 4 mg, Intravenous, Q8H PRN    sodium chloride 0.9%, 10 mL, Intravenous, PRN    Family History       Problem Relation (Age of Onset)    Breast cancer Maternal Aunt, Maternal Aunt, Maternal Aunt    No Known Problems Mother, Father          Tobacco Use    Smoking status: Former     Current packs/day: 0.00     Average packs/day: 1 pack/day for 42.3 years (42.3 ttl pk-yrs)     Types: Cigarettes     Start date:      Quit date: 2004     Years since quittin.7    Smokeless tobacco: Former   Substance and Sexual Activity    Alcohol use: No    Drug use: Never    Sexual activity: Not Currently     Partners: Male       Review of  Systems  Objective:     Vital Signs (Most Recent):  Temp: 98.9 °F (37.2 °C) (01/13/25 0814)  Pulse: 76 (01/13/25 1132)  Resp: 18 (01/13/25 0814)  BP: 111/69 (01/13/25 0814)  SpO2: (!) 90 % (01/13/25 0814) Vital Signs (24h Range):  Temp:  [97.2 °F (36.2 °C)-101.1 °F (38.4 °C)] 98.9 °F (37.2 °C)  Pulse:  [66-89] 76  Resp:  [16-18] 18  SpO2:  [74 %-100 %] 90 %  BP: ()/(48-69) 111/69     Weight: 53.4 kg (117 lb 11.6 oz)  Body mass index is 20.21 kg/m².       Physical Exam  Constitutional:       General: She is not in acute distress.     Appearance: She is ill-appearing.   HENT:      Mouth/Throat:      Mouth: Mucous membranes are moist.   Eyes:      General: No scleral icterus.  Pulmonary:      Effort: Pulmonary effort is normal.   Neurological:      Mental Status: She is alert.      Comments: Falls asleep during exam   Psychiatric:         Mood and Affect: Mood normal.         Behavior: Behavior normal.         Thought Content: Thought content normal.            Review of Symptoms      Symptom Assessment (ESAS 0-10 Scale)  Unable to complete assessment due to Acuity of condition         Pain Assessment in Advanced Demential Scale (PAINAD)   Breathing - Independent of vocalization:  0  Negative vocalization:  0  Facial expression:  0  Body language:  0  Consolability:  0  Total:  0    Performance Status:  30    Living Arrangements:  Lives alone    Psychosocial/Cultural:   See Palliative Psychosocial Note: Yes  Unmarried, two adult children and one brother with whom she is close.   **Primary  to Follow**  Palliative Care  Consult: No        Advance Care Planning  Advance Directives:   Living Will: Yes (family will bring copy for record)    [place]: No    Do Not Resuscitate Status: Yes (desires DNR, wants natural and peaceful death)    Medical Power of : No (wants her two adult children to share HC decision making if she is unable)      Decision Making:  Patient answered questions and  Family answered questions  Goals of Care: The patient and family endorses that what is most important right now is to focus on remaining as independent as possible and symptom/pain control    Accordingly, we have decided that the best plan to meet the patient's goals includes continuing with treatment         Significant Labs: All pertinent labs within the past 24 hours have been reviewed.  CBC:   Recent Labs   Lab 01/13/25  0506   WBC 10.10   HGB 11.3*   HCT 36.7*   *   *     BMP:  Recent Labs   Lab 01/13/25  0506   *   *   K 4.2   CL 99   CO2 27   BUN 77*   CREATININE 1.2   CALCIUM 9.9   MG 1.4*     LFT:  Lab Results   Component Value Date    AST 17 01/13/2025    ALKPHOS 49 01/13/2025    BILITOT 0.3 01/13/2025     Albumin:   Albumin   Date Value Ref Range Status   01/13/2025 2.5 (L) 3.5 - 5.2 g/dL Final     Protein:   Total Protein   Date Value Ref Range Status   01/13/2025 5.4 (L) 6.0 - 8.4 g/dL Final     Lactic acid:   Lab Results   Component Value Date    LACTATE 1.3 12/31/2024    LACTATE 0.8 06/17/2024       Significant Imaging: I have reviewed all pertinent imaging results/findings within the past 24 hours.      I spent a total of 70 minutes on the day of the visit. This includes face to face time in discussion of goals of care, symptom assessment, coordination of care and emotional support.  This also includes non-face to face time preparing to see the patient (eg, review of tests/imaging), obtaining and/or reviewing separately obtained history, documenting clinical information in the electronic or other health record, independently interpreting results and communicating results to the patient/family/caregiver, or care coordinator.    Additional 25 min time spent on a voluntary advance care planning and /or goals of care discussion, providing emotional support, formulating and communicating prognosis and exploring burden/benefit of various approaches of treatment.       Alisa  [facility] Llanes  Palliative Medicine  [facility] ([facility])

## 2025-01-13 NOTE — ASSESSMENT & PLAN NOTE
Palliative Care is following.  Receiving hydrocodone/acetaminophen p.r.n., morphine p.r.n., alprazolam p.r.n. for respiratory distress with good effect

## 2025-01-14 LAB
ANION GAP SERPL CALC-SCNC: 11 MMOL/L (ref 8–16)
BASOPHILS # BLD AUTO: 0.02 K/UL (ref 0–0.2)
BASOPHILS NFR BLD: 0.2 % (ref 0–1.9)
BUN SERPL-MCNC: 74 MG/DL (ref 8–23)
CALCIUM SERPL-MCNC: 10.3 MG/DL (ref 8.7–10.5)
CHLORIDE SERPL-SCNC: 100 MMOL/L (ref 95–110)
CO2 SERPL-SCNC: 23 MMOL/L (ref 23–29)
CREAT SERPL-MCNC: 1 MG/DL (ref 0.5–1.4)
DIFFERENTIAL METHOD BLD: ABNORMAL
EOSINOPHIL # BLD AUTO: 0.1 K/UL (ref 0–0.5)
EOSINOPHIL NFR BLD: 1.7 % (ref 0–8)
ERYTHROCYTE [DISTWIDTH] IN BLOOD BY AUTOMATED COUNT: 14.3 % (ref 11.5–14.5)
EST. GFR  (NO RACE VARIABLE): 57 ML/MIN/1.73 M^2
GLUCOSE SERPL-MCNC: 100 MG/DL (ref 70–110)
HCT VFR BLD AUTO: 37.1 % (ref 37–48.5)
HGB BLD-MCNC: 11.3 G/DL (ref 12–16)
IMM GRANULOCYTES # BLD AUTO: 0.13 K/UL (ref 0–0.04)
IMM GRANULOCYTES NFR BLD AUTO: 1.6 % (ref 0–0.5)
LYMPHOCYTES # BLD AUTO: 1.2 K/UL (ref 1–4.8)
LYMPHOCYTES NFR BLD: 14.7 % (ref 18–48)
MAGNESIUM SERPL-MCNC: 1.4 MG/DL (ref 1.6–2.6)
MCH RBC QN AUTO: 30.9 PG (ref 27–31)
MCHC RBC AUTO-ENTMCNC: 30.5 G/DL (ref 32–36)
MCV RBC AUTO: 101 FL (ref 82–98)
MONOCYTES # BLD AUTO: 0.6 K/UL (ref 0.3–1)
MONOCYTES NFR BLD: 7.4 % (ref 4–15)
NEUTROPHILS # BLD AUTO: 6 K/UL (ref 1.8–7.7)
NEUTROPHILS NFR BLD: 74.4 % (ref 38–73)
NRBC BLD-RTO: 0 /100 WBC
PHOSPHATE SERPL-MCNC: 3.6 MG/DL (ref 2.7–4.5)
PLATELET # BLD AUTO: 108 K/UL (ref 150–450)
PMV BLD AUTO: 11.2 FL (ref 9.2–12.9)
POTASSIUM SERPL-SCNC: 4.4 MMOL/L (ref 3.5–5.1)
RBC # BLD AUTO: 3.66 M/UL (ref 4–5.4)
SODIUM SERPL-SCNC: 134 MMOL/L (ref 136–145)
WBC # BLD AUTO: 8.02 K/UL (ref 3.9–12.7)

## 2025-01-14 PROCEDURE — 21400001 HC TELEMETRY ROOM

## 2025-01-14 PROCEDURE — 25000003 PHARM REV CODE 250: Performed by: STUDENT IN AN ORGANIZED HEALTH CARE EDUCATION/TRAINING PROGRAM

## 2025-01-14 PROCEDURE — 99900035 HC TECH TIME PER 15 MIN (STAT)

## 2025-01-14 PROCEDURE — 36415 COLL VENOUS BLD VENIPUNCTURE: CPT | Performed by: STUDENT IN AN ORGANIZED HEALTH CARE EDUCATION/TRAINING PROGRAM

## 2025-01-14 PROCEDURE — 83735 ASSAY OF MAGNESIUM: CPT | Performed by: STUDENT IN AN ORGANIZED HEALTH CARE EDUCATION/TRAINING PROGRAM

## 2025-01-14 PROCEDURE — 97530 THERAPEUTIC ACTIVITIES: CPT

## 2025-01-14 PROCEDURE — 80048 BASIC METABOLIC PNL TOTAL CA: CPT | Performed by: STUDENT IN AN ORGANIZED HEALTH CARE EDUCATION/TRAINING PROGRAM

## 2025-01-14 PROCEDURE — 85025 COMPLETE CBC W/AUTO DIFF WBC: CPT | Performed by: STUDENT IN AN ORGANIZED HEALTH CARE EDUCATION/TRAINING PROGRAM

## 2025-01-14 PROCEDURE — 27000207 HC ISOLATION

## 2025-01-14 PROCEDURE — 25000003 PHARM REV CODE 250: Performed by: NURSE PRACTITIONER

## 2025-01-14 PROCEDURE — 94761 N-INVAS EAR/PLS OXIMETRY MLT: CPT

## 2025-01-14 PROCEDURE — 63600175 PHARM REV CODE 636 W HCPCS: Performed by: STUDENT IN AN ORGANIZED HEALTH CARE EDUCATION/TRAINING PROGRAM

## 2025-01-14 PROCEDURE — 27000221 HC OXYGEN, UP TO 24 HOURS

## 2025-01-14 PROCEDURE — 63600175 PHARM REV CODE 636 W HCPCS: Performed by: NURSE PRACTITIONER

## 2025-01-14 PROCEDURE — 84100 ASSAY OF PHOSPHORUS: CPT | Performed by: STUDENT IN AN ORGANIZED HEALTH CARE EDUCATION/TRAINING PROGRAM

## 2025-01-14 RX ORDER — FUROSEMIDE 10 MG/ML
40 INJECTION INTRAMUSCULAR; INTRAVENOUS ONCE
Status: COMPLETED | OUTPATIENT
Start: 2025-01-14 | End: 2025-01-14

## 2025-01-14 RX ORDER — GUAIFENESIN 600 MG/1
1200 TABLET, EXTENDED RELEASE ORAL 2 TIMES DAILY
Status: DISCONTINUED | OUTPATIENT
Start: 2025-01-14 | End: 2025-01-22

## 2025-01-14 RX ORDER — MAGNESIUM SULFATE HEPTAHYDRATE 40 MG/ML
2 INJECTION, SOLUTION INTRAVENOUS ONCE
Status: COMPLETED | OUTPATIENT
Start: 2025-01-14 | End: 2025-01-14

## 2025-01-14 RX ORDER — FUROSEMIDE 20 MG/1
20 TABLET ORAL DAILY
Status: DISCONTINUED | OUTPATIENT
Start: 2025-01-15 | End: 2025-01-28 | Stop reason: HOSPADM

## 2025-01-14 RX ADMIN — PANTOPRAZOLE SODIUM 40 MG: 40 TABLET, DELAYED RELEASE ORAL at 09:01

## 2025-01-14 RX ADMIN — ALPRAZOLAM 0.25 MG: 0.25 TABLET ORAL at 08:01

## 2025-01-14 RX ADMIN — ATORVASTATIN CALCIUM 40 MG: 40 TABLET, FILM COATED ORAL at 09:01

## 2025-01-14 RX ADMIN — GABAPENTIN 200 MG: 100 CAPSULE ORAL at 09:01

## 2025-01-14 RX ADMIN — BENZONATATE 100 MG: 100 CAPSULE ORAL at 02:01

## 2025-01-14 RX ADMIN — ENOXAPARIN SODIUM 40 MG: 40 INJECTION SUBCUTANEOUS at 05:01

## 2025-01-14 RX ADMIN — GABAPENTIN 200 MG: 100 CAPSULE ORAL at 08:01

## 2025-01-14 RX ADMIN — ALPRAZOLAM 0.25 MG: 0.25 TABLET ORAL at 02:01

## 2025-01-14 RX ADMIN — ALPRAZOLAM 0.25 MG: 0.25 TABLET ORAL at 09:01

## 2025-01-14 RX ADMIN — GABAPENTIN 200 MG: 100 CAPSULE ORAL at 02:01

## 2025-01-14 RX ADMIN — OXYCODONE HYDROCHLORIDE AND ACETAMINOPHEN 500 MG: 500 TABLET ORAL at 08:01

## 2025-01-14 RX ADMIN — GUAIFENESIN 1200 MG: 600 TABLET, EXTENDED RELEASE ORAL at 08:01

## 2025-01-14 RX ADMIN — FUROSEMIDE 40 MG: 10 INJECTION, SOLUTION INTRAMUSCULAR; INTRAVENOUS at 12:01

## 2025-01-14 RX ADMIN — GUAIFENESIN 1200 MG: 600 TABLET, EXTENDED RELEASE ORAL at 12:01

## 2025-01-14 RX ADMIN — BENZONATATE 100 MG: 100 CAPSULE ORAL at 08:01

## 2025-01-14 RX ADMIN — SACUBITRIL AND VALSARTAN 1 TABLET: 24; 26 TABLET, FILM COATED ORAL at 09:01

## 2025-01-14 RX ADMIN — GUAIFENESIN AND DEXTROMETHORPHAN HYDROBROMIDE 1 TABLET: 600; 30 TABLET, EXTENDED RELEASE ORAL at 09:01

## 2025-01-14 RX ADMIN — FLUTICASONE PROPIONATE 100 MCG: 50 SPRAY, METERED NASAL at 09:01

## 2025-01-14 RX ADMIN — LEVETIRACETAM 500 MG: 500 TABLET, FILM COATED ORAL at 09:01

## 2025-01-14 RX ADMIN — ESCITALOPRAM OXALATE 20 MG: 10 TABLET, FILM COATED ORAL at 09:01

## 2025-01-14 RX ADMIN — MONTELUKAST 10 MG: 10 TABLET, FILM COATED ORAL at 09:01

## 2025-01-14 RX ADMIN — PANTOPRAZOLE SODIUM 40 MG: 40 TABLET, DELAYED RELEASE ORAL at 08:01

## 2025-01-14 RX ADMIN — MAGNESIUM SULFATE HEPTAHYDRATE 2 G: 40 INJECTION, SOLUTION INTRAVENOUS at 09:01

## 2025-01-14 RX ADMIN — THERA TABS 1 TABLET: TAB at 09:01

## 2025-01-14 RX ADMIN — METOPROLOL TARTRATE 50 MG: 50 TABLET, FILM COATED ORAL at 09:01

## 2025-01-14 RX ADMIN — SACUBITRIL AND VALSARTAN 1 TABLET: 24; 26 TABLET, FILM COATED ORAL at 08:01

## 2025-01-14 RX ADMIN — CLOPIDOGREL BISULFATE 75 MG: 75 TABLET ORAL at 09:01

## 2025-01-14 RX ADMIN — METOPROLOL TARTRATE 50 MG: 50 TABLET, FILM COATED ORAL at 08:01

## 2025-01-14 RX ADMIN — FUROSEMIDE 20 MG: 20 TABLET ORAL at 09:01

## 2025-01-14 RX ADMIN — BENZONATATE 100 MG: 100 CAPSULE ORAL at 09:01

## 2025-01-14 RX ADMIN — LEVETIRACETAM 500 MG: 500 TABLET, FILM COATED ORAL at 08:01

## 2025-01-14 RX ADMIN — POLYETHYLENE GLYCOL 3350 17 G: 17 POWDER, FOR SOLUTION ORAL at 09:01

## 2025-01-14 RX ADMIN — OXYCODONE HYDROCHLORIDE AND ACETAMINOPHEN 500 MG: 500 TABLET ORAL at 09:01

## 2025-01-14 NOTE — SUBJECTIVE & OBJECTIVE
Review of Systems   Constitutional:  Negative for fatigue and fever.   HENT: Negative.     Respiratory:  Positive for chest tightness, shortness of breath and wheezing.    Cardiovascular:  Negative for chest pain and leg swelling.   Gastrointestinal:  Negative for abdominal pain, constipation, diarrhea and nausea.   Genitourinary:  Negative for difficulty urinating and dysuria.   Musculoskeletal: Negative.    Skin:  Negative for rash.   Neurological:  Negative for light-headedness and headaches.   Hematological:  Does not bruise/bleed easily.   Psychiatric/Behavioral:  Negative for agitation and behavioral problems.      Objective:     Vital Signs (Most Recent):  Temp: 98.6 °F (37 °C) (01/14/25 1558)  Pulse: 87 (01/14/25 1558)  Resp: 18 (01/14/25 1558)  BP: (!) 123/56 (01/14/25 1558)  SpO2: 100 % (01/14/25 1700) Vital Signs (24h Range):  Temp:  [97.8 °F (36.6 °C)-98.6 °F (37 °C)] 98.6 °F (37 °C)  Pulse:  [73-90] 87  Resp:  [18-20] 18  SpO2:  [92 %-100 %] 100 %  BP: ()/(47-58) 123/56     Weight: 50.8 kg (111 lb 15.9 oz)  Body mass index is 19.22 kg/m².    Intake/Output Summary (Last 24 hours) at 1/14/2025 1708  Last data filed at 1/14/2025 1509  Gross per 24 hour   Intake 1345 ml   Output 3380 ml   Net -2035 ml         Physical Exam  Constitutional:       General: She is not in acute distress.     Comments: Cachectic, ill appearing   HENT:      Head: Normocephalic and atraumatic.      Mouth/Throat:      Mouth: Mucous membranes are moist.      Pharynx: Oropharynx is clear.   Eyes:      General: No scleral icterus.  Cardiovascular:      Rate and Rhythm: Normal rate and regular rhythm.      Heart sounds: No murmur heard.     No gallop.   Pulmonary:      Breath sounds: Wheezing present.      Comments: Expiratory wheezing throughout, mild  Abdominal:      General: Bowel sounds are normal. There is no distension.      Tenderness: There is no abdominal tenderness.   Musculoskeletal:      Right lower leg: No edema.       Left lower leg: No edema.   Skin:     General: Skin is warm and dry.   Neurological:      Mental Status: She is alert and oriented to person, place, and time. Mental status is at baseline.   Psychiatric:         Mood and Affect: Mood normal.         Behavior: Behavior normal.             Significant Labs: All pertinent labs within the past 24 hours have been reviewed.    Significant Imaging: I have reviewed all pertinent imaging results/findings within the past 24 hours.

## 2025-01-14 NOTE — PT/OT/SLP PROGRESS
Physical Therapy Treatment    Patient Name:  Shireen Felix   MRN:  [id number]    Recommendations:     Discharge Recommendations: Moderate Intensity Therapy  Discharge Equipment Recommendations: to be determined by next level of care  Barriers to discharge: Decreased caregiver support    Assessment:     Shireen Felix is a 79 y.o. female admitted with a medical diagnosis of COVID-19.  She presents with the following impairments/functional limitations: weakness, impaired endurance, impaired functional mobility, gait instability, impaired balance, pain, decreased safety awareness, decreased lower extremity function, impaired cardiopulmonary response to activity, decreased ROM, decreased coordination.    Rehab Prognosis: Fair; patient would benefit from acute skilled PT services to address these deficits and reach maximum level of function.    Recent Surgery: * No surgery found *      Plan:     During this hospitalization, patient to be seen 3 x/week to address the identified rehab impairments via gait training, therapeutic activities, therapeutic exercises and progress toward the following goals:    Plan of Care Expires:  01/17/25    Subjective     Chief Complaint: Pt agreed to participate  Patient/Family Comments/goals: none stated  Pain/Comfort:  Pain Rating 1:  (reports pain in chest when coughing, not rated)      Objective:     Communicated with nurse Snyder and epic chart review prior to session.  Patient found HOB elevated with peripheral IV, bed alarm, telemetry, PureWick, oxygen upon PT entry to room.     General Precautions: Standard, airborne, contact, droplet, fall  Orthopedic Precautions: N/A  Braces: N/A  Respiratory Status: Nasal cannula, flow 3 L/min     Functional Mobility:  Gait Belt Applied - Yes   Socks/Shoes Donned - Yes  Increased time needed for all mobility.  Bed Mobility  Rolling Right: minimum assistance  Scooting: minimum assistance  Supine to Sit: minimum assistance for LE  "management and trunk management  Transfers  Sit to Stand: maximal assistance with hand-held assist, x2 reps  Bed to Chair: maximal assistance with hand-held assist using Stand Pivot  B knees buckling, therapist guarding B knees throughout transfer, quick to fatigue  Gait  Patient able to pivot to transfer with hand-held assist and maximal assistance. Patient demonstrates unsteady gait and B knees buckling . No c/o dizziness after standing x1min, resolved once seated. All lines remained intact throughout ambulation trial, portable Supplemental O2 3L utilized.  Balance  Sitting: contact guard assistance  Standing: maximal assistance  Attempted multiple times throughout tx to get SpO2 reading. Attempted with continuous pulse ox, vitals machine, and portable pulse ox. Pt's fingers very cold, attempted to warm them to get a reading but unable. Readings that were available were inconsistent. Pt experiencing no SOB at rest, pt reported mild SOB when coughing and after transfer, resolved with rest. Educated about pursed lip breathing technique and cued for use with mobility.      AM-PAC 6 CLICK MOBILITY  Turning over in bed (including adjusting bedclothes, sheets and blankets)?: 3  Sitting down on and standing up from a chair with arms (e.g., wheelchair, bedside commode, etc.): 2  Moving from lying on back to sitting on the side of the bed?: 3  Moving to and from a bed to a chair (including a wheelchair)?: 2  Need to walk in hospital room?: 1  Climbing 3-5 steps with a railing?: 1  Basic Mobility Total Score: 12       Treatment & Education:  Reviewed role of PT in acute care and POC. Pt tolerated interventions  well. Reviewed importance of OOB activities, activity pacing, and HEP (marching/hip flex, hip abd, heel slides/LAQ, quad sets, ankle pumps) in order to maintain/regain strength. Encouraged to sit up in chair for all meals. Reviewed "call don't fall" policy and increased risk of falling due to weakness, instructed to " utilize call bell for assistance with all transfers. Pt agreeable to all requests.    Patient left up in chair with all lines intact, call button in reach, chair alarm on, nurse notified, and family present..    GOALS:   Multidisciplinary Problems       Physical Therapy Goals          Problem: Physical Therapy    Goal Priority Disciplines Outcome Interventions   Physical Therapy Goal     PT, PT/OT Progressing    Description: Goals to be met by 1/17/25.  1. Pt will complete bed mobility SBA.  2. Pt will complete sit to stand SBA.  3. Pt will ambulate 100ft SBA using RW.  4. Pt will increase AMPAC score by 2 points to progress functional mobility.                       Time Tracking:     PT Received On: 01/14/25  PT Start Time: 1150     PT Stop Time: 1230  PT Total Time (min): 40 min     Billable Minutes: Therapeutic Activity 40min    Treatment Type: Treatment  PT/PTA: PT     Number of PTA visits since last PT visit: 0     01/14/2025

## 2025-01-14 NOTE — PT/OT/SLP PROGRESS
Occupational Therapy   Treatment    Name: Shireen Felix  MRN: [id number]  Admitting Diagnosis:  COVID-19       Recommendations:     Discharge Recommendations: Moderate Intensity Therapy  Discharge Equipment Recommendations:  to be determined by next level of care  Barriers to discharge:  Decreased caregiver support    Assessment:     Shireen Felix is a 79 y.o. female with a medical diagnosis of COVID-19. Performance deficits affecting function are weakness, decreased safety awareness, impaired endurance, impaired self care skills, decreased coordination, impaired cardiopulmonary response to activity, impaired functional mobility, decreased upper extremity function, gait instability, decreased lower extremity function.     Rehab Prognosis:  Good; patient would benefit from acute skilled OT services to address these deficits and reach maximum level of function.       Plan:     Patient to be seen 2 x/week to address the above listed problems via self-care/home management, therapeutic activities, therapeutic exercises  Plan of Care Expires: 01/17/25  Plan of Care Reviewed with: patient, family    Subjective     Chief Complaint: Cough with deep breath  Patient/Family Comments/goals: Increase independence  Pain/Comfort:  Pain Rating 1: 0/10  Pain Rating Post-Intervention 1: 0/10    Objective:     Communicated with: Nurse Medina prior to session.  Patient found HOB elevated with bed alarm, telemetry, oxygen, peripheral IV, PureWick upon OT entry to room.    General Precautions: Standard, airborne, droplet, contact, fall    Orthopedic Precautions:N/A  Braces: N/A  Respiratory Status: Nasal cannula, flow 3 L/min     Occupational Performance:     Bed Mobility:    Patient completed Rolling/Turning to Right with minimum assistance  Patient completed Scooting/Bridging with minimum assistance  Patient completed Supine to Sit with minimum assistance     Functional Mobility/Transfers:  Patient completed Sit <> Stand  Transfer with maximal assistance and of 2 persons  with  hand-held assist   Patient completed Bed <> Chair Transfer using Stand Pivot technique with maximal assistance and of 2 persons with hand-held assist  Functional Mobility: Unable at this time      [facility] 6 Click ADL: 17    Treatment & Education:  Patient on 3L of oxygen at start of therapy session. Pt with complaints of cough when she takes a deep breath.  Unable to obtain accurate oxygen reading, using multiple different pulse ox devices. Attempted placing hot packs to warm hands in hope to obtain oxygen reading- unsuccessful with bilateral hands.  Mild SOB at rest.   Bed mobility min A  Seated scoot min A  Sit to stand max A x2 HHA, standing ~2 minutes with verbal cues for pursed lip breathing  Stand pivot transfer to bedside chair max A x2 with cues for safety   Pt reports mild SOB after transfer. She states feeling well and comfortable in the upright position.  Encouraged patient (and family member) to utilize call button when ready to return to bed with staff assistance. Pt verbalized understanding.  Nursing staff aware and family member in room.    Patient left up in chair with all lines intact, call button in reach, chair alarm on, nursing notified, and family member present    GOALS:   Multidisciplinary Problems       Occupational Therapy Goals          Problem: Occupational Therapy    Goal Priority Disciplines Outcome Interventions   Occupational Therapy Goal     OT, PT/OT Progressing    Description: O.T GOALS MET BY 1-17-25  PT WILL TOLERATE  1 SET X 12 REPS B UE ROM EXERCISE  S WITH UE DRESSING  S WITH LE DRESSING  S WITH TOILET TRANSFERS                         Time Tracking:     OT Date of Treatment: 01/14/25  OT Start Time: 1155  OT Stop Time: 1235  OT Total Time (min): 40 min    Billable Minutes:Therapeutic Activity 40    HUMZA Grayson  OT/CORINA: OT     Number of CORINA visits since last OT visit: 2    1/14/2025

## 2025-01-14 NOTE — PLAN OF CARE
Pt tolerated interventions well. Required MIN A for bed mobility, MAX A for transfer with HHA. Recommending moderate intensity therapy upon d/c.

## 2025-01-14 NOTE — PROGRESS NOTES
[facility] Medicine  Progress Note    Patient Name: Shireen Felix  MRN: [id number]  Patient Class: IP- Inpatient   Admission Date: 12/31/2024  Length of Stay: 13 days  Attending Physician: Kristopher Wynn MD  Primary Care Provider: Laron Chaudhari MD        Subjective     Principal Problem:COVID-19        HPI:  Shireen Felix is a 79 year old female who has  has a past medical history of Abnormal ECG, Acute bronchitis and bronchiolitis, Aneurysm, Anticoagulant long-term use, Arthritis, CAD in native artery, COPD (chronic obstructive pulmonary disease), Diabetes mellitus, Fall, Glaucoma, Hypertension, Seizures, Shingles, and Stroke who presented to Emergency Department for evaluation for cough. Associated symptoms include: congestion, wheezing, and SOB. She was seen by her pulmonologist today, Dr. Sam. Started as sinus pain and drainage and now has progressed to respiratory symptoms. She was seen a few days ago in the ER given prescription for antibiotics steroids but these have not helped much. Today in the office she can barely talk without coughing continually. She is having a difficult time putting more than 3 words together without severe cough paroxysms. ED workup showed Hgb/Hct 11.9/37.0, K+ 3.1, CO2 16, BUN/creatinine 34/1.5. CT chest w/out contrast stable suspected pulmonary fibrosis of the lungs. No acute infiltrates. She has received DuoNeb x 1, solumedrol 125 mg IV x 1, KCL 40 mEq PO x 1 and NS 1L bolus. Pt admitted for COVID.     Overview/Hospital Course:  Admitted to Hospital Medicine for acute on chronic respiratory failure concerns in setting of COVID-19.  Started on IV methylprednisolone, remdesivir, scheduled respiratory treatments. 01/04: Reported left lower quadrant abdominal pain with tenderness to palpation, will evaluate via CT as patient is on therapeutic anticoagulation for COVID-19.  CT abdomen/pelvis nonacute. 01/05:  Reported improvements in  respiratory status with decreased frequency of cough and dyspnea but still reporting significant symptoms from baseline.  On 1/6, patient had a significant event with acute tachypnea, tachycardia. Pulmonology consulted, orders adjusted. Patient stable overnight, had another event on 1/7 similar to previous day. Possible exacerbation related to anxiety provoked by medications and congestion. Case reviewed, orders adjusted. Per Pulmonology, due to underlying lung fibrosis, patient is likely not benefiting from bronchodilators. Stopped on 1/7, patient did not experience any further episodes of anxiety, respiratory distress. Will continue xanax and prn morphine. Patient with epistaxis, small clots in tissue noted. Reduced supplemental O2 to 1L at rest, stopped full dose lovenox and placed on ppx dose. Hgb stable.   Difficulty with discharge plan, patient originally planned to d/c to SNF, now prefers to d/c home.  Pulmonology discussed home palliative care vs hospice.  Patient and family wish to discuss the Services further.  Per discussion with patient, family will be available for an informational visit on Monday.  Educated patient on results of oxygen evaluation, advised patient to reduce oxygen to 2 L or less at rest, requires 4 L with exertion.  As clinically indicated if significant improvement noted will repeat oxygen evaluation.     01/12/2025  O2 sats stable on 2L (while at rest). Patient and family wants to try and go home, but unable to participate with PT over weekend. Repeat desat study on Monday. May have to consider palliative measures if progress plateaus.      01/13/2025  Feeling slightly better today than she felt a few days ago but overall still significantly short of breath with exertion.  Discussed with her, palliative Care, and her family.  She does not want to change to hospice at this time.  She wants to try to get better.  She does want to be DNR though.  Appreciate further palliative care  involvement.  Still desaturating significantly with exertion and unable to work with PT because of this    01/14/2025  Feeling perhaps slightly better today, attempting to work with physical therapy.  We are hoping that we could either get her to rehab if her oxygen requirements with exertion go down, or potentially get a bigger concentrator at home for increasing her up to 10 L with exertion at home.  Otherwise no changes to the plan        Review of Systems   Constitutional:  Negative for fatigue and fever.   HENT: Negative.     Respiratory:  Positive for chest tightness, shortness of breath and wheezing.    Cardiovascular:  Negative for chest pain and leg swelling.   Gastrointestinal:  Negative for abdominal pain, constipation, diarrhea and nausea.   Genitourinary:  Negative for difficulty urinating and dysuria.   Musculoskeletal: Negative.    Skin:  Negative for rash.   Neurological:  Negative for light-headedness and headaches.   Hematological:  Does not bruise/bleed easily.   Psychiatric/Behavioral:  Negative for agitation and behavioral problems.      Objective:     Vital Signs (Most Recent):  Temp: 98.6 °F (37 °C) (01/14/25 1558)  Pulse: 87 (01/14/25 1558)  Resp: 18 (01/14/25 1558)  BP: (!) 123/56 (01/14/25 1558)  SpO2: 100 % (01/14/25 1700) Vital Signs (24h Range):  Temp:  [97.8 °F (36.6 °C)-98.6 °F (37 °C)] 98.6 °F (37 °C)  Pulse:  [73-90] 87  Resp:  [18-20] 18  SpO2:  [92 %-100 %] 100 %  BP: ()/(47-58) 123/56     Weight: 50.8 kg (111 lb 15.9 oz)  Body mass index is 19.22 kg/m².    Intake/Output Summary (Last 24 hours) at 1/14/2025 1708  Last data filed at 1/14/2025 1509  Gross per 24 hour   Intake 1345 ml   Output 3380 ml   Net -2035 ml         Physical Exam  Constitutional:       General: She is not in acute distress.     Comments: Cachectic, ill appearing   HENT:      Head: Normocephalic and atraumatic.      Mouth/Throat:      Mouth: Mucous membranes are moist.      Pharynx: Oropharynx is clear.    Eyes:      General: No scleral icterus.  Cardiovascular:      Rate and Rhythm: Normal rate and regular rhythm.      Heart sounds: No murmur heard.     No gallop.   Pulmonary:      Breath sounds: Wheezing present.      Comments: Expiratory wheezing throughout, mild  Abdominal:      General: Bowel sounds are normal. There is no distension.      Tenderness: There is no abdominal tenderness.   Musculoskeletal:      Right lower leg: No edema.      Left lower leg: No edema.   Skin:     General: Skin is warm and dry.   Neurological:      Mental Status: She is alert and oriented to person, place, and time. Mental status is at baseline.   Psychiatric:         Mood and Affect: Mood normal.         Behavior: Behavior normal.             Significant Labs: All pertinent labs within the past 24 hours have been reviewed.    Significant Imaging: I have reviewed all pertinent imaging results/findings within the past 24 hours.    Assessment and Plan     * COVID-19  Patient is identified as Severe COVID-19 based on hypoxemia with O2 saturations <94% on room air or on ambulation   Initiate standard COVID protocols; COVID-19 testing ,Infection Control notification  and isolation- respiratory, contact and droplet per protocol    Diagnostics: CBC, CMP, Ferritin, CRP, Troponin, and Portable CXR    Management: Initiate targeted therapy with Remdesivir, 200mg IV x1, followed by 100mg IV daily x5 days total and Anticoagulation, Patient admitted to non-critical care unit- Will initiate full dose anticoagulation with Weight based lovenox 1mg/kg IV q12, stopped on 1/11/25, Maintain oxygen saturations 92-96% via Nasal Cannula  and monitor with continuous/intermittent pulse oximetry. , and Continuous cardiac monitoring.    Encounter for palliative care  Palliative Care is following.  Receiving hydrocodone/acetaminophen p.r.n., morphine p.r.n., alprazolam p.r.n. for respiratory distress with good effect      Epistaxis  Likely secondary to  supplemental O2 and full anticoagulation    --Stop full dose lovenox on 1/11  --Recommend humidified O2 when in use  --mupirocin in bilateral nares      Acute on chronic respiratory failure with hypoxia  Patient with Hypoxic Respiratory failure which is Acute on chronic.  she is not on home oxygen. Supplemental oxygen was provided and noted- Oxygen Concentration (%):  [28] 28    .   Signs/symptoms of respiratory failure include- tachypnea, increased work of breathing, respiratory distress, use of accessory muscles, wheezing, stridor, and lethargy. Contributing diagnoses includes - Interstitial lung disease Labs and images were reviewed. Patient Has recent ABG, which has been reviewed. Will treat underlying causes and adjust management of respiratory failure as follows-     Home Oxygen evaluation performed:  [facility] Card[facility]      Date Performed: 1/8/2025     1)Patient's Home O2 Sat on room air, while at rest: Room Air SpO2 At Rest: 99 %                             If O2 sats on room air at rest are 88% or below, patient qualifies.  Document O2 liter flow needed in Step 2.  If O2 sats are 89% or above, complete Step 3.        2)         If patient is not ambulated and O2 sats are <88%, what is the O2 liter flow required to meet ordered saturation? Home O2 Eval Comments: pt qualifies for home oxygen     If O2 sats on room air while exercising remain 89% or above patient does not qualify, no further testing needed Document N/A in step 3. If O2 sats on room air while exercising are 88% or below, continue to Step 4.     3)Patient's O2 Sat on room air while exercising: Room Air SpO2 During Ambulation: (!) 78 %                             4)Patient's O2 Sat while exercising on O2: SpO2 During Ambulation on O2: 96 % at Ambulation O2 LPM: 4 LPM                             (Must show improvement from #4 for patients to qualify)    Patient qualifies for Home O2 @4L NC due  "to exertional dyspnea, hypoxia    Hypokalemia  Patient's most recent potassium results are listed below.   Recent Labs     01/08/25  0604 01/09/25  0508 01/10/25  0531   K 4.3 4.9 4.6       Plan  - Replete potassium per protocol  - Monitor potassium Daily  - Patient's hypokalemia is  being treated. Received KCL 40 mEq PO x 1 in ED  -  Stable    CKD (chronic kidney disease)  Creatine stable for now. BMP reviewed- noted Estimated Creatinine Clearance: 31.5 mL/min (based on SCr of 1.2 mg/dL). according to latest data. Based on current GFR, CKD stage is stage 4 - GFR 15-29.  Monitor UOP and serial BMP and adjust therapy as needed. Renally dose meds. Avoid nephrotoxic medications and procedures.  -Creatinine 1.5 on admission however 3 months ago was 1.2    Recent Labs     01/11/25  0557 01/12/25  0525 01/13/25  0506   CREATININE 1.0 1.2 1.2         -Due to GFR, will need to dc ranexa for now and decrease dose of gabapentin  -Hold spironolactone   -Continue Entresto   -Monitor creatinine  -stable    Chronic systolic heart failure  Patient has Diastolic (HFpEF) heart failure that is Chronic. On presentation their CHF was well compensated. Most recent BNP and echo results are listed below.  No results for input(s): "BNP" in the last 72 hours.    Latest ECHO  Results for orders placed during the hospital encounter of 06/02/23    Echo    Interpretation Summary  · The left ventricle is normal in size with concentric hypertrophy and low normal systolic function.  · The estimated ejection fraction is 50%.  · Normal left ventricular diastolic function.  · There is abnormal septal wall motion consistent with left bundle branch block.  · Normal right ventricular size with normal right ventricular systolic function.  · Normal central venous pressure (3 mmHg).  · The estimated PA systolic pressure is 30 mmHg.  · Mild mitral regurgitation.    Current Heart Failure Medications  sacubitriL-valsartan 24-26 mg per tablet 1 tablet, 2 times " daily, Oral  furosemide tablet 20 mg, Daily, Oral    Plan  - Monitor strict I&Os and daily weights.    - Place on telemetry  - Low sodium diet  - Place on fluid restriction of 1.5 L.   - Cardiology has not been consulted  - The patient's volume status is at their baseline  - Repeat ECHO, EF improved  -stop IV Lasix, resume home dose p.o. Lasix           Mixed hyperlipidemia  Lab Results   Component Value Date    CHOL 112 (L) 07/12/2024    HDL 41 07/12/2024    LDLCALC 49.8 (L) 07/12/2024    TRIG 106 07/12/2024     -Continue Statin    Normocytic anemia  Anemia is likely due to chronic disease due to Chronic Kidney Disease. Most recent hemoglobin and hematocrit are listed below.  Recent Labs     01/11/25  0557 01/12/25  0525 01/13/25  0506   HGB 12.1 13.7 11.3*   HCT 38.8 44.6 36.7*       Plan  - Monitor serial CBC: Daily  - Transfuse PRBC if patient becomes hemodynamically unstable, symptomatic or H/H drops below 7/21.  - Patient has not received any PRBC transfusions to date  - Patient's anemia is currently stable    Seizure  Hx of seizures, patient has not had a seizure during this admission.     -Continue Keppra  -Seizure precautions      Coronary artery disease of native artery of native heart with stable angina pectoris  Patient with known CAD, which is controlled Will continue Plavix and Statin and monitor for S/Sx of angina/ACS. Continue to monitor on telemetry.     UIP (usual interstitial pneumonitis)  Pulm fibrosis hx- followed by pulmonology outpatient  -Will need close pulm f/u after discharge  -patient and family not interested in hospice at this time, want to try giving more time for improvement  -palliative Care involved        GERD (gastroesophageal reflux disease)  -Continue PPI        VTE Risk Mitigation (From admission, onward)           Ordered     enoxaparin injection 40 mg  Every 24 hours         01/11/25 1203                    Discharge Planning   KATHERINE:      Code Status: DNR   Medical Readiness  for Discharge Date:   Discharge Plan A: Inpatient Hospice   Discharge Delays: None known at this time            Kristopher Wynn MD  Department of Hospital Medicine   [facility])

## 2025-01-14 NOTE — PLAN OF CARE
Per therapy, Patient was able to tolerate movement on 3L of oxygen and their recommendations are SNF placement for patient.  Patient was previously accepted by [facility] and PARTHA reached out to admissions, to speak to Yasmeen, to see if they can still accept. Yasmeen did not answer, however PARTHA left message with .     15 24 Per Yasmeen, with [facility] admission, they do still have beds available and requested updated clinicals for acceptance for SNF. PARTHA stated she would upload updates to EPIC.  Yasmeen verbalized understanding and verified fax number.     15 41 SW meet with patient at bedside regarding therapy recommendations for SNF placement. Patient was agreeable to SNF in [place] for placement. Patient also agreeable to SW reaching out to Patient's daughter to update them on DC plan.   SW will attempt to contact Patient's daughters.     SW will continue to follow and assist as needed.

## 2025-01-14 NOTE — PLAN OF CARE
Pt resting in bed .   Pt awake and alert .   Hob elevated .   Vitals stable .   O2 at 3 1/2 liter  POC discussed .   IV intact   Safety Measures in place   Chart check complete .   Will  continue to monitor .

## 2025-01-14 NOTE — PLAN OF CARE
Patient on 3L of oxygen at start of therapy session. \  Unable to obtain accurate oxygen reading, using multiple different pulse ox devices.  Attempted hot packs to warm hands in hope to obtain oxygen reading- unsuccessful.  Mild SOB at rest.   Bed mobility min A  Seated scoot min A  Sit to stand max A x2 HHA, standing ~2 minutes  Stand pivot transfer to bedside chair max A x2 with cues for safety   Pt reports mild SOB after transfer. She states feeling well and comfortable in the upright position.  Nursing staff aware and family member in room.    Recommend moderate intensity therapy at DC

## 2025-01-14 NOTE — PLAN OF CARE
POC was reviewed with the patient, who verbalized understanding. The patient has no questions at this time.  [id number]. NSR on a cardiac monitor. Isolation precautions remain in place. A small nosebleed was noted.  Pt has not experienced any falls. No complaints were expressed at this time. Safety measures are in place and ongoing monitoring will continue. Pt was instructed to call for assistance before getting up, and verbalized understanding.  Hourly rounding and chart checks have been completed.

## 2025-01-14 NOTE — PLAN OF CARE
Nutrition Recommendations/Interventions 1/14/25:   1. Recommend pt continues on a Cardiac diet, texture per SLP recommendations   2. Recommend pt continues on Suplena TID to assist filling nutritional gaps   3. Encourage PO and supplement intake, recommend feeding assistance as warranted   4. Weigh twice weekly  5. Collaboration by nutrition professional with other providers     Goals:   1. Diet will ne modified to safest texture within 24 hrs if warranted   2. Pt will tolerate and consume >75% EEN and EPN prior to RD follow up    PRIYANKA Cerda, RDN, LDN

## 2025-01-14 NOTE — PROGRESS NOTES
[facility] - Telemetry ([facility])  Adult Nutrition  Progress Note    SUMMARY       Recommendations    Recommendation/Intervention:   1. Recommend pt continues on a Cardiac diet, texture per SLP recommendations   2. Recommend pt continues on Suplena TID to assist filling nutritional gaps   3. Encourage PO and supplement intake, recommend feeding assistance as warranted   4. Weigh twice weekly    Goals:   1. Diet will ne modified to safest texture within 24 hrs if warranted   2. Pt will tolerate and consume >75% EEN and EPN prior to RD follow up  Nutrition Goal Status: new  Communication of RD Recs: other (comment) (POC, sticky note)    Assessment and Plan    Nutrition Problem  Underweight  Inadequate energy intake   Decreased nutrient needs (protein)    Related to (etiology):   Increased demand for nutrition   Decreased ability to consume sufficient energy   CKD    Signs and Symptoms (as evidenced by):   BMI < 22 (older adult)  SOB, Estimated intake of food less than estimated needs   Current ONS exceeds protein needs     Interventions/Recommendations (treatment strategy):  1. Decreased sodium and fat, texture modified diet   2. Management of commercial beverage medical food supplement therapy  3. Feeding assistance management   4. Collaboration by nutrition professional with other providers    Nutrition Diagnosis Status:   New      Malnutrition Assessment  Malnutrition Level: other (see comments) (unable to determine)  Skin (Micronutrient): bruised (Gilberto score = 18 (mild risk)                               Reason for Assessment    Reason For Assessment: RD follow-up  Diagnosis:  (COVID-19)  General Information Comments: 1/14/25: 79 y.o. Female admitted for COVID-19. PMH: GERD, UIP, CAD, Seizure, Normocytic anemia, HLD, Chronic systolic HF, CKD, Acute on chronic respiatory failure w/ hypoxia, Epistaxis; Hx: Stroke. Pt is currently on isolation and a Cardiac diet + Boost plus TID. RD follow up. EMR noted pt is doing  "better, pt still significantly SOB with exertion, pt denied hospice at this time, pt's goal is to "try to get better", pt negative for N/V/D/C. Spoke to RN via secure chat, confirmed pt is drinking Boost plus. Note Gt ordered but no wounds noted, RD d/c'd from pt's orders and trays, also modified ONS to Suplena to assist filling nutritional gaps but within protein needs for CKD. Per chart: 100% breakfast, 0% lunch PO intake today (1/14), LBM 1/12, GI WDL. Labs, meds, weights reviewed. Weight charted 1/7/25 112 lbs, 1/14/25 111 lbs (BMI 19.22, Underweight for age), wt stable. Unable to perform NFPE d/t pt's isolation status, will perform when appropriate. RD will continue to follow and monitor pt's nutritional status during admit.Nutrition Discharge Planning: Cardiac diet, texture per SLP recommendations + Boost plus as warranted    Nutrition/Diet History    Spiritual, Cultural Beliefs, Methodist Practices, Values that Affect Care: no  Food Allergies: NKFA  Factors Affecting Nutritional Intake: other (see comments) (SOB)    Nutrition Related Social Determinants of Health: SDOH: Unable to assess at this time.     Anthropometrics    Height: 5' 4" (162.6 cm)  Height (inches): 64 in  Height Method: Stated  Weight: 50.8 kg (111 lb 15.9 oz)  Weight (lb): 111.99 lb  Weight Method: Bed Scale  Ideal Body Weight (IBW), Female: 120 lb  % Ideal Body Weight, Female (lb): 93.33 %  BMI (Calculated): 19.2  BMI Grade: 18.5-24.9 - normal (Underweight for age)       Wt Readings from Last 15 Encounters:   01/14/25 50.8 kg (111 lb 15.9 oz)   12/31/24 49.2 kg (108 lb 7.5 oz)   12/05/24 49.2 kg (108 lb 7.5 oz)   11/21/24 48.3 kg (106 lb 7.7 oz)   09/04/24 46.4 kg (102 lb 4.7 oz)   09/04/24 47.7 kg (105 lb 4.3 oz)   08/01/24 45.8 kg (101 lb)   07/17/24 46.5 kg (102 lb 8.2 oz)   05/27/24 48.1 kg (106 lb)   05/23/24 48.1 kg (106 lb 0.7 oz)   05/22/24 47.6 kg (104 lb 15 oz)   05/17/24 47.4 kg (104 lb 8 oz)   05/08/24 47.4 kg (104 lb 8 oz) " "  05/08/24 47.4 kg (104 lb 8 oz)   05/08/24 47.4 kg (104 lb 8 oz)     Lab/Procedures/Meds    Pertinent Labs Reviewed: reviewed  Pertinent Medications Reviewed: reviewed  Pertinent Medications Comments: 500 mg/day vitamin C, MTV, polyethylene glycol    BMP  Lab Results   Component Value Date     (L) 01/14/2025    K 4.4 01/14/2025     01/14/2025    CO2 23 01/14/2025    BUN 74 (H) 01/14/2025    CREATININE 1.0 01/14/2025    CALCIUM 10.3 01/14/2025    ANIONGAP 11 01/14/2025    EGFRNORACEVR 57 (A) 01/14/2025     Lab Results   Component Value Date    CALCIUM 10.3 01/14/2025    PHOS 3.6 01/14/2025     Lab Results   Component Value Date    ALBUMIN 2.5 (L) 01/13/2025     Lab Results   Component Value Date    ALT 24 01/13/2025    AST 17 01/13/2025    ALKPHOS 49 01/13/2025    BILITOT 0.3 01/13/2025     No results for input(s): "POCTGLUCOSE" in the last 24 hours.    Lab Results   Component Value Date    HGBA1C 4.9 07/12/2024     Lab Results   Component Value Date    WBC 8.02 01/14/2025    HGB 11.3 (L) 01/14/2025    HCT 37.1 01/14/2025     (H) 01/14/2025     (L) 01/14/2025       Scheduled Meds:   ALPRAZolam  0.25 mg Oral TID    ascorbic acid (vitamin C)  500 mg Oral BID    atorvastatin  40 mg Oral Daily    benzonatate  100 mg Oral TID    clopidogreL  75 mg Oral Daily    enoxparin  40 mg Subcutaneous Q24H (prophylaxis, 1700)    EScitalopram oxalate  20 mg Oral Daily    fluticasone propionate  2 spray Each Nostril Daily    [START ON 1/15/2025] furosemide  20 mg Oral Daily    gabapentin  200 mg Oral TID    guaiFENesin  1,200 mg Oral BID    levETIRAcetam  500 mg Oral BID    metoprolol tartrate  50 mg Oral BID    montelukast  10 mg Oral Daily    multivitamin  1 tablet Oral Daily    pantoprazole  40 mg Oral BID    polyethylene glycol  17 g Oral Daily    sacubitriL-valsartan  1 tablet Oral BID     Continuous Infusions:  PRN Meds:.  Current Facility-Administered Medications:     acetaminophen, 650 mg, Oral, Q6H " PRN    albuterol-ipratropium, 3 mL, Nebulization, Q4H PRN    dextrose 50%, 12.5 g, Intravenous, PRN    dextrose 50%, 25 g, Intravenous, PRN    glucagon (human recombinant), 1 mg, Intramuscular, PRN    glucose, 16 g, Oral, PRN    glucose, 24 g, Oral, PRN    HYDROcodone-acetaminophen, 1 tablet, Oral, Q6H PRN    magic mouthwash (diphenhydrAMINE 12.5 mg/5 mL 20 mL, aluminum & magnesium hydroxide-simethicone (MYLANTA) 20 mL, LIDOcaine HCl 2% (XYLOCAINE) 20 mL) solution, 10 mL, Swish & Spit, Q4H PRN    melatonin, 6 mg, Oral, Nightly PRN    methocarbamoL, 500 mg, Oral, TID PRN    morphine, 2 mg, Intravenous, Q4H PRN    ondansetron, 4 mg, Intravenous, Q8H PRN    sodium chloride 0.9%, 10 mL, Intravenous, PRN      Estimated/Assessed Needs    Weight Used For Calorie Calculations: 50.8 kg (111 lb 15.9 oz)  Energy Calorie Requirements (kcal): 7229-5359 kcals (30-35 kcals/kg ABW (Underweight for age))  Energy Need Method: Kcal/kg  Protein Requirements: 27-31 g (0.55-0.6 g/kg ABW (CKD, non dialysis)  Weight Used For Protein Calculations: 50.8 kg (111 lb 15.9 oz)  Fluid Requirements (mL): 7175-1477 mL (1 mL/kcal, per MD/NP)  Estimated Fluid Requirement Method: RDA Method  RDA Method (mL): 1524  CHO Requirement: 190-222 g (6952-0811 kcals/8)      Nutrition Prescription Ordered    Current Diet Order: Cardiac diet  Oral Nutrition Supplement: Suplena TID    Evaluation of Received Nutrient/Fluid Intake  I/O: (Net since admit):  1/14/25: -9250 mL    Energy Calories Required: not meeting needs  Protein Required: meeting needs  Fluid Required: not meeting needs  Total Fluid Intake (mL): 510  Tolerance: tolerating  % Intake of Estimated Energy Needs: 50 - 75 %  % Meal Intake: 0-100%    Nutrition Risk    Level of Risk/Frequency of Follow-up: high (F/u x 2 weekly)     Monitor and Evaluation    Food and Nutrient Intake: food and beverage intake, energy intake  Food and Nutrient Adminstration: diet order  Knowledge/Beliefs/Attitudes: food and  nutrition knowledge/skill, beliefs and attitudes  Anthropometric Measurements: weight, weight change, body mass index  Biochemical Data, Medical Tests and Procedures: electrolyte and renal panel     Nutrition Follow-Up    RD Follow-up?: Yes  PRIYANKA Cerda, RDN, LDN

## 2025-01-15 LAB
ANION GAP SERPL CALC-SCNC: 9 MMOL/L (ref 8–16)
BASOPHILS # BLD AUTO: 0.02 K/UL (ref 0–0.2)
BASOPHILS NFR BLD: 0.2 % (ref 0–1.9)
BUN SERPL-MCNC: 75 MG/DL (ref 8–23)
CALCIUM SERPL-MCNC: 9.4 MG/DL (ref 8.7–10.5)
CHLORIDE SERPL-SCNC: 101 MMOL/L (ref 95–110)
CO2 SERPL-SCNC: 23 MMOL/L (ref 23–29)
CREAT SERPL-MCNC: 1.2 MG/DL (ref 0.5–1.4)
DIFFERENTIAL METHOD BLD: ABNORMAL
EOSINOPHIL # BLD AUTO: 0.1 K/UL (ref 0–0.5)
EOSINOPHIL NFR BLD: 1.1 % (ref 0–8)
ERYTHROCYTE [DISTWIDTH] IN BLOOD BY AUTOMATED COUNT: 14.5 % (ref 11.5–14.5)
EST. GFR  (NO RACE VARIABLE): 46 ML/MIN/1.73 M^2
GLUCOSE SERPL-MCNC: 135 MG/DL (ref 70–110)
HCT VFR BLD AUTO: 34.7 % (ref 37–48.5)
HGB BLD-MCNC: 10.7 G/DL (ref 12–16)
IMM GRANULOCYTES # BLD AUTO: 0.09 K/UL (ref 0–0.04)
IMM GRANULOCYTES NFR BLD AUTO: 1 % (ref 0–0.5)
LYMPHOCYTES # BLD AUTO: 1.4 K/UL (ref 1–4.8)
LYMPHOCYTES NFR BLD: 15.6 % (ref 18–48)
MAGNESIUM SERPL-MCNC: 1.9 MG/DL (ref 1.6–2.6)
MCH RBC QN AUTO: 30.5 PG (ref 27–31)
MCHC RBC AUTO-ENTMCNC: 30.8 G/DL (ref 32–36)
MCV RBC AUTO: 99 FL (ref 82–98)
MONOCYTES # BLD AUTO: 0.9 K/UL (ref 0.3–1)
MONOCYTES NFR BLD: 10.3 % (ref 4–15)
NEUTROPHILS # BLD AUTO: 6.4 K/UL (ref 1.8–7.7)
NEUTROPHILS NFR BLD: 71.8 % (ref 38–73)
NRBC BLD-RTO: 0 /100 WBC
PHOSPHATE SERPL-MCNC: 3.3 MG/DL (ref 2.7–4.5)
PLATELET # BLD AUTO: 123 K/UL (ref 150–450)
PMV BLD AUTO: 10.6 FL (ref 9.2–12.9)
POTASSIUM SERPL-SCNC: 4.8 MMOL/L (ref 3.5–5.1)
RBC # BLD AUTO: 3.51 M/UL (ref 4–5.4)
SODIUM SERPL-SCNC: 133 MMOL/L (ref 136–145)
WBC # BLD AUTO: 8.86 K/UL (ref 3.9–12.7)

## 2025-01-15 PROCEDURE — 99900035 HC TECH TIME PER 15 MIN (STAT)

## 2025-01-15 PROCEDURE — 83735 ASSAY OF MAGNESIUM: CPT | Performed by: STUDENT IN AN ORGANIZED HEALTH CARE EDUCATION/TRAINING PROGRAM

## 2025-01-15 PROCEDURE — 27000207 HC ISOLATION

## 2025-01-15 PROCEDURE — 21400001 HC TELEMETRY ROOM

## 2025-01-15 PROCEDURE — 80048 BASIC METABOLIC PNL TOTAL CA: CPT | Performed by: STUDENT IN AN ORGANIZED HEALTH CARE EDUCATION/TRAINING PROGRAM

## 2025-01-15 PROCEDURE — 25000003 PHARM REV CODE 250: Performed by: NURSE PRACTITIONER

## 2025-01-15 PROCEDURE — 63600175 PHARM REV CODE 636 W HCPCS: Performed by: NURSE PRACTITIONER

## 2025-01-15 PROCEDURE — 27000221 HC OXYGEN, UP TO 24 HOURS

## 2025-01-15 PROCEDURE — 94761 N-INVAS EAR/PLS OXIMETRY MLT: CPT

## 2025-01-15 PROCEDURE — 25000003 PHARM REV CODE 250: Performed by: STUDENT IN AN ORGANIZED HEALTH CARE EDUCATION/TRAINING PROGRAM

## 2025-01-15 PROCEDURE — 84100 ASSAY OF PHOSPHORUS: CPT | Performed by: STUDENT IN AN ORGANIZED HEALTH CARE EDUCATION/TRAINING PROGRAM

## 2025-01-15 PROCEDURE — 85025 COMPLETE CBC W/AUTO DIFF WBC: CPT | Performed by: STUDENT IN AN ORGANIZED HEALTH CARE EDUCATION/TRAINING PROGRAM

## 2025-01-15 RX ADMIN — LEVETIRACETAM 500 MG: 500 TABLET, FILM COATED ORAL at 10:01

## 2025-01-15 RX ADMIN — ALPRAZOLAM 0.25 MG: 0.25 TABLET ORAL at 02:01

## 2025-01-15 RX ADMIN — PANTOPRAZOLE SODIUM 40 MG: 40 TABLET, DELAYED RELEASE ORAL at 10:01

## 2025-01-15 RX ADMIN — OXYCODONE HYDROCHLORIDE AND ACETAMINOPHEN 500 MG: 500 TABLET ORAL at 08:01

## 2025-01-15 RX ADMIN — GABAPENTIN 200 MG: 100 CAPSULE ORAL at 10:01

## 2025-01-15 RX ADMIN — FLUTICASONE PROPIONATE 100 MCG: 50 SPRAY, METERED NASAL at 10:01

## 2025-01-15 RX ADMIN — BENZONATATE 100 MG: 100 CAPSULE ORAL at 03:01

## 2025-01-15 RX ADMIN — ENOXAPARIN SODIUM 40 MG: 40 INJECTION SUBCUTANEOUS at 05:01

## 2025-01-15 RX ADMIN — GABAPENTIN 200 MG: 100 CAPSULE ORAL at 02:01

## 2025-01-15 RX ADMIN — PANTOPRAZOLE SODIUM 40 MG: 40 TABLET, DELAYED RELEASE ORAL at 08:01

## 2025-01-15 RX ADMIN — LEVETIRACETAM 500 MG: 500 TABLET, FILM COATED ORAL at 08:01

## 2025-01-15 RX ADMIN — FUROSEMIDE 20 MG: 20 TABLET ORAL at 12:01

## 2025-01-15 RX ADMIN — THERA TABS 1 TABLET: TAB at 10:01

## 2025-01-15 RX ADMIN — MORPHINE SULFATE 2 MG: 2 INJECTION, SOLUTION INTRAMUSCULAR; INTRAVENOUS at 02:01

## 2025-01-15 RX ADMIN — METOPROLOL TARTRATE 50 MG: 50 TABLET, FILM COATED ORAL at 12:01

## 2025-01-15 RX ADMIN — CLOPIDOGREL BISULFATE 75 MG: 75 TABLET ORAL at 10:01

## 2025-01-15 RX ADMIN — BENZONATATE 100 MG: 100 CAPSULE ORAL at 10:01

## 2025-01-15 RX ADMIN — ATORVASTATIN CALCIUM 40 MG: 40 TABLET, FILM COATED ORAL at 10:01

## 2025-01-15 RX ADMIN — POLYETHYLENE GLYCOL 3350 17 G: 17 POWDER, FOR SOLUTION ORAL at 10:01

## 2025-01-15 RX ADMIN — GUAIFENESIN 1200 MG: 600 TABLET, EXTENDED RELEASE ORAL at 10:01

## 2025-01-15 RX ADMIN — SACUBITRIL AND VALSARTAN 1 TABLET: 24; 26 TABLET, FILM COATED ORAL at 10:01

## 2025-01-15 RX ADMIN — MONTELUKAST 10 MG: 10 TABLET, FILM COATED ORAL at 10:01

## 2025-01-15 RX ADMIN — ESCITALOPRAM OXALATE 20 MG: 10 TABLET, FILM COATED ORAL at 10:01

## 2025-01-15 RX ADMIN — OXYCODONE HYDROCHLORIDE AND ACETAMINOPHEN 500 MG: 500 TABLET ORAL at 10:01

## 2025-01-15 RX ADMIN — GABAPENTIN 200 MG: 100 CAPSULE ORAL at 08:01

## 2025-01-15 RX ADMIN — ALPRAZOLAM 0.25 MG: 0.25 TABLET ORAL at 08:01

## 2025-01-15 RX ADMIN — BENZONATATE 100 MG: 100 CAPSULE ORAL at 08:01

## 2025-01-15 RX ADMIN — GUAIFENESIN 1200 MG: 600 TABLET, EXTENDED RELEASE ORAL at 08:01

## 2025-01-15 NOTE — PT/OT/SLP PROGRESS
Occupational Therapy      Patient Name:  Shireen Felix   MRN:  [id number]    OT treatment attempted at 0930- pt severely soiled  Attempted again @1015- being cleaned.  Attempted @ 1255- patient on bed pan.   Attempt @ 1411- pt receiving nursing care.    Will continue efforts.    Socorro Ellis MOT, LOTR    1/15/2025

## 2025-01-15 NOTE — PT/OT/SLP PROGRESS
Physical Therapy      Patient Name:  Shireen Felix   MRN:  [id number]    Chart review complete. Presented to pt's room at 0937. Patient not seen today secondary to bowel/bladder accident. Will continue efforts.    Presented to pt's room again at 1015. Patient not seen today secondary to nursing care. Will continue efforts.    Presented to pt's room again at 1250. Patient not seen today secondary to pt on bed pan. Will continue efforts.    Presented to pt's room again at 1411. Patient not seen today secondary to nursing care. Will continue efforts.    Yola Tomlinson, PT, DPT  1/15/2025

## 2025-01-15 NOTE — PLAN OF CARE
Problem: Adult Inpatient Plan of Care  Goal: Plan of Care Review  Outcome: Progressing  Goal: Patient-Specific Goal (Individualized)  Outcome: Progressing  Goal: Absence of Hospital-Acquired Illness or Injury  Outcome: Progressing  Goal: Optimal Comfort and Wellbeing  Outcome: Progressing  Goal: Readiness for Transition of Care  Outcome: Progressing     Problem: Diabetes Comorbidity  Goal: Blood Glucose Level Within Targeted Range  Outcome: Progressing     Problem: Fall Injury Risk  Goal: Absence of Fall and Fall-Related Injury  Outcome: Progressing     Problem: Wound  Goal: Optimal Coping  Outcome: Progressing  Goal: Optimal Functional Ability  Outcome: Progressing  Goal: Absence of Infection Signs and Symptoms  Outcome: Progressing  Goal: Improved Oral Intake  Outcome: Progressing  Goal: Optimal Pain Control and Function  Outcome: Progressing  Goal: Skin Health and Integrity  Outcome: Progressing  Goal: Optimal Wound Healing  Outcome: Progressing     Problem: Skin Injury Risk Increased  Goal: Skin Health and Integrity  Outcome: Progressing     Problem: Coping Ineffective  Goal: Effective Coping  Outcome: Progressing

## 2025-01-15 NOTE — PT/OT/SLP PROGRESS
Occupational Therapy      Patient Name:  Shireen Felix   MRN:  [id number]    OT treatment attempted at 1255- patient on bed sainz.    Socorro CARDENAS, LOTR    1/15/2025

## 2025-01-15 NOTE — PLAN OF CARE
Additional SNF referrals sent to  area SNF's in network with Patient's insurance.     SW will continue to follow and assist as needed.

## 2025-01-16 LAB
ANION GAP SERPL CALC-SCNC: 10 MMOL/L (ref 8–16)
BASOPHILS # BLD AUTO: 0.02 K/UL (ref 0–0.2)
BASOPHILS NFR BLD: 0.3 % (ref 0–1.9)
BILIRUB UR QL STRIP: NEGATIVE
BUN SERPL-MCNC: 68 MG/DL (ref 8–23)
CALCIUM SERPL-MCNC: 9.1 MG/DL (ref 8.7–10.5)
CHLORIDE SERPL-SCNC: 100 MMOL/L (ref 95–110)
CLARITY UR: CLEAR
CO2 SERPL-SCNC: 22 MMOL/L (ref 23–29)
COLOR UR: YELLOW
CREAT SERPL-MCNC: 1.2 MG/DL (ref 0.5–1.4)
DIFFERENTIAL METHOD BLD: ABNORMAL
EOSINOPHIL # BLD AUTO: 0.1 K/UL (ref 0–0.5)
EOSINOPHIL NFR BLD: 2 % (ref 0–8)
ERYTHROCYTE [DISTWIDTH] IN BLOOD BY AUTOMATED COUNT: 14.4 % (ref 11.5–14.5)
EST. GFR  (NO RACE VARIABLE): 46 ML/MIN/1.73 M^2
GLUCOSE SERPL-MCNC: 112 MG/DL (ref 70–110)
GLUCOSE UR QL STRIP: NEGATIVE
HCT VFR BLD AUTO: 31.7 % (ref 37–48.5)
HGB BLD-MCNC: 9.8 G/DL (ref 12–16)
HGB UR QL STRIP: NEGATIVE
IMM GRANULOCYTES # BLD AUTO: 0.06 K/UL (ref 0–0.04)
IMM GRANULOCYTES NFR BLD AUTO: 0.9 % (ref 0–0.5)
KETONES UR QL STRIP: NEGATIVE
LEUKOCYTE ESTERASE UR QL STRIP: NEGATIVE
LYMPHOCYTES # BLD AUTO: 1.3 K/UL (ref 1–4.8)
LYMPHOCYTES NFR BLD: 19.9 % (ref 18–48)
MAGNESIUM SERPL-MCNC: 1.5 MG/DL (ref 1.6–2.6)
MCH RBC QN AUTO: 31 PG (ref 27–31)
MCHC RBC AUTO-ENTMCNC: 30.9 G/DL (ref 32–36)
MCV RBC AUTO: 100 FL (ref 82–98)
MONOCYTES # BLD AUTO: 0.8 K/UL (ref 0.3–1)
MONOCYTES NFR BLD: 12.2 % (ref 4–15)
NEUTROPHILS # BLD AUTO: 4.1 K/UL (ref 1.8–7.7)
NEUTROPHILS NFR BLD: 64.7 % (ref 38–73)
NITRITE UR QL STRIP: NEGATIVE
NRBC BLD-RTO: 0 /100 WBC
PH UR STRIP: 6 [PH] (ref 5–8)
PHOSPHATE SERPL-MCNC: 2.7 MG/DL (ref 2.7–4.5)
PLATELET # BLD AUTO: 116 K/UL (ref 150–450)
PMV BLD AUTO: 10.4 FL (ref 9.2–12.9)
POTASSIUM SERPL-SCNC: 4.5 MMOL/L (ref 3.5–5.1)
PROT UR QL STRIP: NEGATIVE
RBC # BLD AUTO: 3.16 M/UL (ref 4–5.4)
SODIUM SERPL-SCNC: 132 MMOL/L (ref 136–145)
SP GR UR STRIP: 1.01 (ref 1–1.03)
URN SPEC COLLECT METH UR: NORMAL
UROBILINOGEN UR STRIP-ACNC: NEGATIVE EU/DL
WBC # BLD AUTO: 6.37 K/UL (ref 3.9–12.7)

## 2025-01-16 PROCEDURE — 25000003 PHARM REV CODE 250: Performed by: STUDENT IN AN ORGANIZED HEALTH CARE EDUCATION/TRAINING PROGRAM

## 2025-01-16 PROCEDURE — 27000207 HC ISOLATION

## 2025-01-16 PROCEDURE — 97530 THERAPEUTIC ACTIVITIES: CPT

## 2025-01-16 PROCEDURE — 83735 ASSAY OF MAGNESIUM: CPT | Performed by: STUDENT IN AN ORGANIZED HEALTH CARE EDUCATION/TRAINING PROGRAM

## 2025-01-16 PROCEDURE — 25000003 PHARM REV CODE 250: Performed by: NURSE PRACTITIONER

## 2025-01-16 PROCEDURE — 85025 COMPLETE CBC W/AUTO DIFF WBC: CPT | Performed by: STUDENT IN AN ORGANIZED HEALTH CARE EDUCATION/TRAINING PROGRAM

## 2025-01-16 PROCEDURE — 63600175 PHARM REV CODE 636 W HCPCS: Performed by: NURSE PRACTITIONER

## 2025-01-16 PROCEDURE — 80048 BASIC METABOLIC PNL TOTAL CA: CPT | Performed by: STUDENT IN AN ORGANIZED HEALTH CARE EDUCATION/TRAINING PROGRAM

## 2025-01-16 PROCEDURE — 84100 ASSAY OF PHOSPHORUS: CPT | Performed by: STUDENT IN AN ORGANIZED HEALTH CARE EDUCATION/TRAINING PROGRAM

## 2025-01-16 PROCEDURE — 81003 URINALYSIS AUTO W/O SCOPE: CPT | Performed by: STUDENT IN AN ORGANIZED HEALTH CARE EDUCATION/TRAINING PROGRAM

## 2025-01-16 PROCEDURE — 21400001 HC TELEMETRY ROOM

## 2025-01-16 PROCEDURE — 97535 SELF CARE MNGMENT TRAINING: CPT

## 2025-01-16 RX ORDER — LEVETIRACETAM 500 MG/1
1000 TABLET ORAL 2 TIMES DAILY
Status: DISCONTINUED | OUTPATIENT
Start: 2025-01-16 | End: 2025-01-16

## 2025-01-16 RX ORDER — SPIRONOLACTONE 25 MG/1
50 TABLET ORAL DAILY
Status: DISCONTINUED | OUTPATIENT
Start: 2025-01-16 | End: 2025-01-17

## 2025-01-16 RX ORDER — LEVETIRACETAM 500 MG/1
1500 TABLET ORAL 2 TIMES DAILY
Status: DISCONTINUED | OUTPATIENT
Start: 2025-01-16 | End: 2025-01-28 | Stop reason: HOSPADM

## 2025-01-16 RX ADMIN — ESCITALOPRAM OXALATE 20 MG: 10 TABLET, FILM COATED ORAL at 09:01

## 2025-01-16 RX ADMIN — ALPRAZOLAM 0.25 MG: 0.25 TABLET ORAL at 09:01

## 2025-01-16 RX ADMIN — GABAPENTIN 200 MG: 100 CAPSULE ORAL at 08:01

## 2025-01-16 RX ADMIN — POLYETHYLENE GLYCOL 3350 17 G: 17 POWDER, FOR SOLUTION ORAL at 09:01

## 2025-01-16 RX ADMIN — MORPHINE SULFATE 2 MG: 2 INJECTION, SOLUTION INTRAMUSCULAR; INTRAVENOUS at 09:01

## 2025-01-16 RX ADMIN — METOPROLOL TARTRATE 50 MG: 50 TABLET, FILM COATED ORAL at 08:01

## 2025-01-16 RX ADMIN — OXYCODONE HYDROCHLORIDE AND ACETAMINOPHEN 500 MG: 500 TABLET ORAL at 08:01

## 2025-01-16 RX ADMIN — PANTOPRAZOLE SODIUM 40 MG: 40 TABLET, DELAYED RELEASE ORAL at 09:01

## 2025-01-16 RX ADMIN — GUAIFENESIN 1200 MG: 600 TABLET, EXTENDED RELEASE ORAL at 08:01

## 2025-01-16 RX ADMIN — ATORVASTATIN CALCIUM 40 MG: 40 TABLET, FILM COATED ORAL at 09:01

## 2025-01-16 RX ADMIN — ALPRAZOLAM 0.25 MG: 0.25 TABLET ORAL at 08:01

## 2025-01-16 RX ADMIN — GUAIFENESIN 1200 MG: 600 TABLET, EXTENDED RELEASE ORAL at 09:01

## 2025-01-16 RX ADMIN — MONTELUKAST 10 MG: 10 TABLET, FILM COATED ORAL at 09:01

## 2025-01-16 RX ADMIN — METOPROLOL TARTRATE 50 MG: 50 TABLET, FILM COATED ORAL at 09:01

## 2025-01-16 RX ADMIN — OXYCODONE HYDROCHLORIDE AND ACETAMINOPHEN 500 MG: 500 TABLET ORAL at 09:01

## 2025-01-16 RX ADMIN — FLUTICASONE PROPIONATE 100 MCG: 50 SPRAY, METERED NASAL at 09:01

## 2025-01-16 RX ADMIN — SACUBITRIL AND VALSARTAN 1 TABLET: 24; 26 TABLET, FILM COATED ORAL at 08:01

## 2025-01-16 RX ADMIN — LEVETIRACETAM 1500 MG: 500 TABLET, FILM COATED ORAL at 08:01

## 2025-01-16 RX ADMIN — BENZONATATE 100 MG: 100 CAPSULE ORAL at 08:01

## 2025-01-16 RX ADMIN — ENOXAPARIN SODIUM 40 MG: 40 INJECTION SUBCUTANEOUS at 06:01

## 2025-01-16 RX ADMIN — FUROSEMIDE 20 MG: 20 TABLET ORAL at 09:01

## 2025-01-16 RX ADMIN — ALPRAZOLAM 0.25 MG: 0.25 TABLET ORAL at 03:01

## 2025-01-16 RX ADMIN — THERA TABS 1 TABLET: TAB at 09:01

## 2025-01-16 RX ADMIN — GABAPENTIN 200 MG: 100 CAPSULE ORAL at 03:01

## 2025-01-16 RX ADMIN — SPIRONOLACTONE 50 MG: 25 TABLET ORAL at 03:01

## 2025-01-16 RX ADMIN — CLOPIDOGREL BISULFATE 75 MG: 75 TABLET ORAL at 09:01

## 2025-01-16 RX ADMIN — BENZONATATE 100 MG: 100 CAPSULE ORAL at 09:01

## 2025-01-16 RX ADMIN — BENZONATATE 100 MG: 100 CAPSULE ORAL at 03:01

## 2025-01-16 RX ADMIN — GABAPENTIN 200 MG: 100 CAPSULE ORAL at 09:01

## 2025-01-16 RX ADMIN — LEVETIRACETAM 500 MG: 500 TABLET, FILM COATED ORAL at 09:01

## 2025-01-16 RX ADMIN — PANTOPRAZOLE SODIUM 40 MG: 40 TABLET, DELAYED RELEASE ORAL at 08:01

## 2025-01-16 NOTE — PLAN OF CARE
Pt tolerated interventions well. Required CGA for bed mobility, ambulated 4 steps to transfer to chair MAX A of 2 with HHA. Recommending moderate intensity therapy upon d/c.

## 2025-01-16 NOTE — SUBJECTIVE & OBJECTIVE
Review of Systems   Constitutional:  Negative for fatigue and fever.   HENT: Negative.     Respiratory:  Positive for chest tightness, shortness of breath and wheezing.    Cardiovascular:  Negative for chest pain and leg swelling.   Gastrointestinal:  Negative for abdominal pain, constipation, diarrhea and nausea.   Genitourinary:  Negative for difficulty urinating and dysuria.   Musculoskeletal: Negative.    Skin:  Negative for rash.   Neurological:  Negative for light-headedness and headaches.   Hematological:  Does not bruise/bleed easily.   Psychiatric/Behavioral:  Negative for agitation and behavioral problems.      Objective:     Vital Signs (Most Recent):  Temp: 98.3 °F (36.8 °C) (01/15/25 1546)  Pulse: 86 (01/15/25 1700)  Resp: 18 (01/15/25 1546)  BP: (!) 114/54 (01/15/25 1546)  SpO2: 98 % (01/15/25 1546) Vital Signs (24h Range):  Temp:  [97.4 °F (36.3 °C)-99.3 °F (37.4 °C)] 98.3 °F (36.8 °C)  Pulse:  [73-97] 86  Resp:  [16-20] 18  SpO2:  [93 %-100 %] 98 %  BP: (102-121)/(46-72) 114/54     Weight: 53.3 kg (117 lb 8.1 oz)  Body mass index is 20.17 kg/m².    Intake/Output Summary (Last 24 hours) at 1/15/2025 1807  Last data filed at 1/15/2025 1208  Gross per 24 hour   Intake 408.76 ml   Output 1200 ml   Net -791.24 ml         Physical Exam  Constitutional:       General: She is not in acute distress.     Comments: Cachectic, ill appearing   HENT:      Head: Normocephalic and atraumatic.      Mouth/Throat:      Mouth: Mucous membranes are moist.      Pharynx: Oropharynx is clear.   Eyes:      General: No scleral icterus.  Cardiovascular:      Rate and Rhythm: Normal rate and regular rhythm.      Heart sounds: No murmur heard.     No gallop.   Pulmonary:      Breath sounds: Wheezing present.      Comments: Expiratory wheezing throughout, mild  Abdominal:      General: Bowel sounds are normal. There is no distension.      Tenderness: There is no abdominal tenderness.   Musculoskeletal:      Right lower leg:  No edema.      Left lower leg: No edema.   Skin:     General: Skin is warm and dry.   Neurological:      Mental Status: She is alert and oriented to person, place, and time. Mental status is at baseline.   Psychiatric:         Mood and Affect: Mood normal.         Behavior: Behavior normal.             Significant Labs: All pertinent labs within the past 24 hours have been reviewed.    Significant Imaging: I have reviewed all pertinent imaging results/findings within the past 24 hours.

## 2025-01-16 NOTE — PROGRESS NOTES
[facility] Medicine  Progress Note    Patient Name: Shireen Felix  MRN: [id number]  Patient Class: IP- Inpatient   Admission Date: 12/31/2024  Length of Stay: 15 days  Attending Physician: Kristopher Wynn MD  Primary Care Provider: Laron Chaudhari MD        Subjective     Principal Problem:COVID-19        HPI:  Shireen Felix is a 79 year old female who has  has a past medical history of Abnormal ECG, Acute bronchitis and bronchiolitis, Aneurysm, Anticoagulant long-term use, Arthritis, CAD in native artery, COPD (chronic obstructive pulmonary disease), Diabetes mellitus, Fall, Glaucoma, Hypertension, Seizures, Shingles, and Stroke who presented to Emergency Department for evaluation for cough. Associated symptoms include: congestion, wheezing, and SOB. She was seen by her pulmonologist today, Dr. Sam. Started as sinus pain and drainage and now has progressed to respiratory symptoms. She was seen a few days ago in the ER given prescription for antibiotics steroids but these have not helped much. Today in the office she can barely talk without coughing continually. She is having a difficult time putting more than 3 words together without severe cough paroxysms. ED workup showed Hgb/Hct 11.9/37.0, K+ 3.1, CO2 16, BUN/creatinine 34/1.5. CT chest w/out contrast stable suspected pulmonary fibrosis of the lungs. No acute infiltrates. She has received DuoNeb x 1, solumedrol 125 mg IV x 1, KCL 40 mEq PO x 1 and NS 1L bolus. Pt admitted for COVID.     Overview/Hospital Course:  Admitted to Hospital Medicine for acute on chronic respiratory failure concerns in setting of COVID-19.  Started on IV methylprednisolone, remdesivir, scheduled respiratory treatments. 01/04: Reported left lower quadrant abdominal pain with tenderness to palpation, will evaluate via CT as patient is on therapeutic anticoagulation for COVID-19.  CT abdomen/pelvis nonacute. 01/05:  Reported improvements in  respiratory status with decreased frequency of cough and dyspnea but still reporting significant symptoms from baseline.  On 1/6, patient had a significant event with acute tachypnea, tachycardia. Pulmonology consulted, orders adjusted. Patient stable overnight, had another event on 1/7 similar to previous day. Possible exacerbation related to anxiety provoked by medications and congestion. Case reviewed, orders adjusted. Per Pulmonology, due to underlying lung fibrosis, patient is likely not benefiting from bronchodilators. Stopped on 1/7, patient did not experience any further episodes of anxiety, respiratory distress. Will continue xanax and prn morphine. Patient with epistaxis, small clots in tissue noted. Reduced supplemental O2 to 1L at rest, stopped full dose lovenox and placed on ppx dose. Hgb stable.   Difficulty with discharge plan, patient originally planned to d/c to SNF, now prefers to d/c home.  Pulmonology discussed home palliative care vs hospice.  Patient and family wish to discuss the Services further.  Per discussion with patient, family will be available for an informational visit on Monday.  Educated patient on results of oxygen evaluation, advised patient to reduce oxygen to 2 L or less at rest, requires 4 L with exertion.  As clinically indicated if significant improvement noted will repeat oxygen evaluation.     01/12/2025  O2 sats stable on 2L (while at rest). Patient and family wants to try and go home, but unable to participate with PT over weekend. Repeat desat study on Monday. May have to consider palliative measures if progress plateaus.      01/13/2025  Feeling slightly better today than she felt a few days ago but overall still significantly short of breath with exertion.  Discussed with her, palliative Care, and her family.  She does not want to change to hospice at this time.  She wants to try to get better.  She does want to be DNR though.  Appreciate further palliative care  involvement.  Still desaturating significantly with exertion and unable to work with PT because of this    01/14/2025  Feeling perhaps slightly better today, attempting to work with physical therapy.  We are hoping that we could either get her to rehab if her oxygen requirements with exertion go down, or potentially get a bigger concentrator at home for increasing her up to 10 L with exertion at home.  Otherwise no changes to the plan    01/15/2025:  Still having coughing spells but notably improved.  Oxygen is being down titrated and she even got a room air today.  We are screening her for rehabs    01/16/2025:  Coughing she states is much worse when oxygen is off.  She was on room air but I put her on 1 L to see if there is truly an effect.  She is still being screened for rehabs.  Overall she looks much better.  She did technically have a fever today to 101.4 but when I evaluate her she looks notably better, CXR is negative for acute change, would not recommend starting antibiotics and she has defervesced        Review of Systems   Constitutional:  Negative for fatigue and fever.   HENT: Negative.     Respiratory:  Positive for chest tightness, shortness of breath and wheezing.    Cardiovascular:  Negative for chest pain and leg swelling.   Gastrointestinal:  Negative for abdominal pain, constipation, diarrhea and nausea.   Genitourinary:  Negative for difficulty urinating and dysuria.   Musculoskeletal: Negative.    Skin:  Negative for rash.   Neurological:  Negative for light-headedness and headaches.   Hematological:  Does not bruise/bleed easily.   Psychiatric/Behavioral:  Negative for agitation and behavioral problems.      Objective:     Vital Signs (Most Recent):  Temp: 98.3 °F (36.8 °C) (01/16/25 1151)  Pulse: 82 (01/16/25 1151)  Resp: 20 (01/16/25 1151)  BP: (!) 110/55 (01/16/25 1151)  SpO2: 98 % (01/16/25 1151) Vital Signs (24h Range):  Temp:  [98.3 °F (36.8 °C)-101.4 °F (38.6 °C)] 98.3 °F (36.8  °C)  Pulse:  [82-97] 82  Resp:  [15-20] 20  SpO2:  [94 %-100 %] 98 %  BP: ()/(46-56) 110/55     Weight: 53.5 kg (117 lb 15.1 oz)  Body mass index is 20.25 kg/m².  No intake or output data in the 24 hours ending 01/16/25 1335        Physical Exam  Constitutional:       General: She is not in acute distress.     Comments: Cachectic, ill appearing   HENT:      Head: Normocephalic and atraumatic.      Mouth/Throat:      Mouth: Mucous membranes are moist.      Pharynx: Oropharynx is clear.   Eyes:      General: No scleral icterus.  Cardiovascular:      Rate and Rhythm: Normal rate and regular rhythm.      Heart sounds: No murmur heard.     No gallop.   Pulmonary:      Breath sounds: Wheezing present.      Comments: Expiratory wheezing throughout, mild  Abdominal:      General: Bowel sounds are normal. There is no distension.      Tenderness: There is no abdominal tenderness.   Musculoskeletal:      Right lower leg: No edema.      Left lower leg: No edema.   Skin:     General: Skin is warm and dry.   Neurological:      Mental Status: She is alert and oriented to person, place, and time. Mental status is at baseline.   Psychiatric:         Mood and Affect: Mood normal.         Behavior: Behavior normal.             Significant Labs: All pertinent labs within the past 24 hours have been reviewed.    Significant Imaging: I have reviewed all pertinent imaging results/findings within the past 24 hours.    Assessment and Plan     * COVID-19  Patient is identified as Severe COVID-19 based on hypoxemia with O2 saturations <94% on room air or on ambulation   Initiate standard COVID protocols; COVID-19 testing ,Infection Control notification  and isolation- respiratory, contact and droplet per protocol    Diagnostics: CBC, CMP, Ferritin, CRP, Troponin, and Portable CXR    Management: Initiate targeted therapy with Remdesivir, 200mg IV x1, followed by 100mg IV daily x5 days total and Anticoagulation, Patient admitted to  non-critical care unit- Will initiate full dose anticoagulation with Weight based lovenox 1mg/kg IV q12, stopped on 1/11/25, Maintain oxygen saturations 92-96% via Nasal Cannula  and monitor with continuous/intermittent pulse oximetry. , and Continuous cardiac monitoring.    Encounter for palliative care  Palliative Care is following.  Receiving hydrocodone/acetaminophen p.r.n., morphine p.r.n., alprazolam p.r.n. for respiratory distress with good effect      Epistaxis  Likely secondary to supplemental O2 and full anticoagulation    --Stop full dose lovenox on 1/11  --Recommend humidified O2 when in use  --mupirocin in bilateral nares      Acute on chronic respiratory failure with hypoxia  Patient with Hypoxic Respiratory failure which is Acute on chronic.  she is not on home oxygen. Supplemental oxygen was provided and noted-      .   Signs/symptoms of respiratory failure include- tachypnea, increased work of breathing, respiratory distress, use of accessory muscles, wheezing, stridor, and lethargy. Contributing diagnoses includes - Interstitial lung disease Labs and images were reviewed. Patient Has recent ABG, which has been reviewed. Will treat underlying causes and adjust management of respiratory failure as follows-     Home Oxygen evaluation performed:  Home Oxygen Evaluation - [facility] - Cardiopulmonary Department      Date Performed: 1/8/2025     1)Patient's Home O2 Sat on room air, while at rest: Room Air SpO2 At Rest: 99 %                             If O2 sats on room air at rest are 88% or below, patient qualifies.  Document O2 liter flow needed in Step 2.  If O2 sats are 89% or above, complete Step 3.        2)         If patient is not ambulated and O2 sats are <88%, what is the O2 liter flow required to meet ordered saturation? Home O2 Eval Comments: pt qualifies for home oxygen     If O2 sats on room air while exercising remain 89% or above patient does not qualify, no further testing  "needed Document N/A in step 3. If O2 sats on room air while exercising are 88% or below, continue to Step 4.     3)Patient's O2 Sat on room air while exercising: Room Air SpO2 During Ambulation: (!) 78 %                             4)Patient's O2 Sat while exercising on O2: SpO2 During Ambulation on O2: 96 % at Ambulation O2 LPM: 4 LPM                             (Must show improvement from #4 for patients to qualify)    Patient qualifies for Home O2 @4L NC due to exertional dyspnea, hypoxia    Hypokalemia  Patient's most recent potassium results are listed below.   Recent Labs     01/08/25  0604 01/09/25  0508 01/10/25  0531   K 4.3 4.9 4.6       Plan  - Replete potassium per protocol  - Monitor potassium Daily  - Patient's hypokalemia is  being treated. Received KCL 40 mEq PO x 1 in ED  -  Stable    CKD (chronic kidney disease)  Creatine stable for now. BMP reviewed- noted Estimated Creatinine Clearance: 31.6 mL/min (based on SCr of 1.2 mg/dL). according to latest data. Based on current GFR, CKD stage is stage 4 - GFR 15-29.  Monitor UOP and serial BMP and adjust therapy as needed. Renally dose meds. Avoid nephrotoxic medications and procedures.  -Creatinine 1.5 on admission however 3 months ago was 1.2    Recent Labs     01/14/25  0533 01/15/25  0457 01/16/25  0526   CREATININE 1.0 1.2 1.2         -Due to GFR, will need to dc ranexa for now and decrease dose of gabapentin  -Held spironolactone now restarting 1/16/25  -Continue Entresto   -Monitor creatinine  -stable    Chronic systolic heart failure  Patient has Diastolic (HFpEF) heart failure that is Chronic. On presentation their CHF was well compensated. Most recent BNP and echo results are listed below.  No results for input(s): "BNP" in the last 72 hours.    Latest ECHO  Results for orders placed during the hospital encounter of 06/02/23    Echo    Interpretation Summary  · The left ventricle is normal in size with concentric hypertrophy and low normal " systolic function.  · The estimated ejection fraction is 50%.  · Normal left ventricular diastolic function.  · There is abnormal septal wall motion consistent with left bundle branch block.  · Normal right ventricular size with normal right ventricular systolic function.  · Normal central venous pressure (3 mmHg).  · The estimated PA systolic pressure is 30 mmHg.  · Mild mitral regurgitation.    Current Heart Failure Medications  sacubitriL-valsartan 24-26 mg per tablet 1 tablet, 2 times daily, Oral  furosemide tablet 20 mg, Daily, Oral  spironolactone tablet 50 mg, Daily, Oral    Plan  - Monitor strict I&Os and daily weights.    - Place on telemetry  - Low sodium diet  - Place on fluid restriction of 1.5 L.   - Cardiology has not been consulted  - The patient's volume status is at their baseline  - Repeat ECHO, EF improved  - stop IV Lasix, resume home dose p.o. Lasix           Mixed hyperlipidemia  Lab Results   Component Value Date    CHOL 112 (L) 07/12/2024    HDL 41 07/12/2024    LDLCALC 49.8 (L) 07/12/2024    TRIG 106 07/12/2024     -Continue Statin    Fall  Full night of 1/15 with head strike   Evaluated by nighttime hospitalist   Head CT was negative   Neuro exam on 01/16 is normal   Bed alarm is on   Working with physical therapy, she will be discharged to rehab      Normocytic anemia  Anemia is likely due to chronic disease due to Chronic Kidney Disease. Most recent hemoglobin and hematocrit are listed below.  Recent Labs     01/13/25  0506 01/14/25  0533 01/15/25  0457   HGB 11.3* 11.3* 10.7*   HCT 36.7* 37.1 34.7*       Plan  - Monitor serial CBC: Daily  - Transfuse PRBC if patient becomes hemodynamically unstable, symptomatic or H/H drops below 7/21.  - Patient has not received any PRBC transfusions to date  - Patient's anemia is currently stable    Seizure  Hx of seizures, patient has not had a seizure during this admission.     -Continue Keppra  -Seizure precautions      Coronary artery disease of  native artery of native heart with stable angina pectoris  Patient with known CAD, which is controlled Will continue Plavix and Statin and monitor for S/Sx of angina/ACS. Continue to monitor on telemetry.     UIP (usual interstitial pneumonitis)  Pulm fibrosis hx- followed by pulmonology outpatient  -Will need close pulm f/u after discharge  -we have been discussing possible transitioned to hospice, but now she is improving in his on room air at times, and plan for patient to either go to rehab        GERD (gastroesophageal reflux disease)  -Continue PPI        VTE Risk Mitigation (From admission, onward)           Ordered     enoxaparin injection 40 mg  Every 24 hours         01/11/25 1203                    Discharge Planning   KATHERINE:      Code Status: DNR   Medical Readiness for Discharge Date:   Discharge Plan A: Skilled Nursing Facility   Discharge Delays: None known at this time              Kristopher Wynn MD  Department of Hospital Medicine   [facility] - Telemetry ([facility])

## 2025-01-16 NOTE — ASSESSMENT & PLAN NOTE
Patient with Hypoxic Respiratory failure which is Acute on chronic.  she is not on home oxygen. Supplemental oxygen was provided and noted-      .   Signs/symptoms of respiratory failure include- tachypnea, increased work of breathing, respiratory distress, use of accessory muscles, wheezing, stridor, and lethargy. Contributing diagnoses includes - Interstitial lung disease Labs and images were reviewed. Patient Has recent ABG, which has been reviewed. Will treat underlying causes and adjust management of respiratory failure as follows-     Home Oxygen evaluation performed:  Home Oxygen Evaluation - [facility] - Cardiopulmonary Department      Date Performed: 1/8/2025     1)Patient's Home O2 Sat on room air, while at rest: Room Air SpO2 At Rest: 99 %                             If O2 sats on room air at rest are 88% or below, patient qualifies.  Document O2 liter flow needed in Step 2.  If O2 sats are 89% or above, complete Step 3.        2)         If patient is not ambulated and O2 sats are <88%, what is the O2 liter flow required to meet ordered saturation? Home O2 Eval Comments: pt qualifies for home oxygen     If O2 sats on room air while exercising remain 89% or above patient does not qualify, no further testing needed Document N/A in step 3. If O2 sats on room air while exercising are 88% or below, continue to Step 4.     3)Patient's O2 Sat on room air while exercising: Room Air SpO2 During Ambulation: (!) 78 %                             4)Patient's O2 Sat while exercising on O2: SpO2 During Ambulation on O2: 96 % at Ambulation O2 LPM: 4 LPM                             (Must show improvement from #4 for patients to qualify)    Patient qualifies for Home O2 @4L NC due to exertional dyspnea, hypoxia

## 2025-01-16 NOTE — NURSING
"Nurse was called to pt room stating she had a fall. When entering the room pt was found on the floor with a right shoulder skin tear, "states she hit her head' CT/Head ordered. Vitals 114/56 hr 94 o2 94 resp 18  "

## 2025-01-16 NOTE — SIGNIFICANT EVENT
Bed alarm sounded, patient found on floor on side of the bed.  She states that she hit her head.  Staff assisted her back into bed.  She does not have any other complaints of pain at this time, moving all extremities with normal range of motion.    STAT CT head ordered and there are no acute findings

## 2025-01-16 NOTE — ASSESSMENT & PLAN NOTE
Full night of 1/15 with head strike   Evaluated by nighttime hospitalist   Head CT was negative   Neuro exam on 01/16 is normal   Bed alarm is on   Working with physical therapy, she will be discharged to rehab

## 2025-01-16 NOTE — PT/OT/SLP PROGRESS
Physical Therapy Treatment    Patient Name:  Shireen Felix   MRN:  [id number]    Recommendations:     Discharge Recommendations: Moderate Intensity Therapy  Discharge Equipment Recommendations: to be determined by next level of care  Barriers to discharge: Decreased caregiver support    Assessment:     Shireen Felix is a 79 y.o. female admitted with a medical diagnosis of COVID-19.  She presents with the following impairments/functional limitations: weakness, impaired endurance, impaired functional mobility, gait instability, impaired balance, pain, decreased safety awareness, decreased lower extremity function, decreased ROM, impaired cardiopulmonary response to activity.    Rehab Prognosis: Fair; patient would benefit from acute skilled PT services to address these deficits and reach maximum level of function.    Recent Surgery: * No surgery found *      Plan:     During this hospitalization, patient to be seen 3 x/week to address the identified rehab impairments via therapeutic activities, therapeutic exercises, gait training and progress toward the following goals:    Plan of Care Expires:  01/17/25    Subjective     Chief Complaint: Pt is motivated to participate  Patient/Family Comments/goals: to return to PLOF  Pain/Comfort:  Pain Rating 1: 6/10 (via faces scale)  Location 1:  (general soreness due to fall, mostly in R shoulder and L foot)  Pain Addressed 1: Reposition, Distraction  Pain Rating Post-Intervention 1: 5/10      Objective:     Communicated with nurse Cormier and epic chart review prior to session.  Patient found HOB elevated with peripheral IV, telemetry, bed alarm, PureWick (OLIVIA SYS) upon PT entry to room.     General Precautions: Standard, airborne, contact, droplet, fall  Orthopedic Precautions: N/A  Braces: N/A  Respiratory Status: Room air     Functional Mobility:  Gait Belt Applied - Yes   Socks/Shoes Donned - Yes  Increased time needed  Bed Mobility  Rolling Right: contact  "guard assistance  Scooting: contact guard assistance  Supine to Sit: contact guard assistance  Transfers  Sit to Stand: moderate assistance and of 2 persons with hand-held assist  Bed to Chair: maximal assistance and of 2 persons with hand-held assist using Step Transfer  Gait  Patient ambulated 4 steps to transfer with hand-held assist and maximal assistance and of 2 persons. Patient demonstrates unsteady gait, B knees buckling throughout, guarding of B knees by therapist throughout. No c/o dizziness or SOB. All lines remained intact throughout ambulation trial.  Balance  Sitting: contact guard assistance  Standing: maximal assistance      AM-PAC 6 CLICK MOBILITY  Turning over in bed (including adjusting bedclothes, sheets and blankets)?: 3  Sitting down on and standing up from a chair with arms (e.g., wheelchair, bedside commode, etc.): 2  Moving from lying on back to sitting on the side of the bed?: 3  Moving to and from a bed to a chair (including a wheelchair)?: 2  Need to walk in hospital room?: 2  Climbing 3-5 steps with a railing?: 1 (NT)  Basic Mobility Total Score: 13       Treatment & Education:  Reviewed role of PT in acute care and POC. Pt tolerated interventions well. Reviewed importance of OOB activities, activity pacing, and HEP (marching/hip flex, hip abd, heel slides/LAQ, quad sets, ankle pumps) in order to maintain/regain strength. Encouraged to sit up in chair for all meals. Reviewed proper use of RW for safety and to reduce risk of falling. Reviewed "call don't fall" policy and increased risk of falling due to weakness, instructed to utilize call bell for assistance with all transfers. Pt agreeable to all requests.    Patient left up in chair with all lines intact, call button in reach, and chair alarm on..    GOALS:   Multidisciplinary Problems       Physical Therapy Goals          Problem: Physical Therapy    Goal Priority Disciplines Outcome Interventions   Physical Therapy Goal     PT, PT/OT " Progressing    Description: Goals to be met by 1/17/25.  1. Pt will complete bed mobility SBA.  2. Pt will complete sit to stand SBA.  3. Pt will ambulate 100ft SBA using RW.  4. Pt will increase AMPAC score by 2 points to progress functional mobility.                       Time Tracking:     PT Received On: 01/16/25  PT Start Time: 0815     PT Stop Time: 0840  PT Total Time (min): 25 min     Billable Minutes: Therapeutic Activity 25min    Treatment Type: Treatment  PT/PTA: PT     Number of PTA visits since last PT visit: 0     01/16/2025

## 2025-01-16 NOTE — PLAN OF CARE
A223/A223 OLU Felix is a 79 y.o.female admitted on 12/31/2024 for COVID-19   Code Status: DNR MRN: [id number]   Review of patient's allergies indicates:   Allergen Reactions    Penicillins Anaphylaxis, Hives, Shortness Of Breath and Swelling    Penicillin     Adhesive Rash    Doxycycline Nausea Only    Oxycodone Other (See Comments)     Fatigue      Sulfa (sulfonamide antibiotics) Itching     Past Medical History:   Diagnosis Date    Abnormal ECG 08/05/2019    Acute bronchitis and bronchiolitis 12/31/2024    Aneurysm     Anticoagulant long-term use     Arthritis     CAD in native artery 08/05/2019    Diabetes mellitus     Fall 10/10/2019    Formatting of this note might be different from the original. Found sitting on floor next to bed last night Mild confusion today Does not recall falling or how she ended up on floor UA today Labs yesterday unremarkable    Glaucoma     Hypertension     Pulmonary fibrosis     Seizures     Shingles 05/27/2017    Stroke       PRN meds    acetaminophen, 650 mg, Q6H PRN  albuterol-ipratropium, 3 mL, Q4H PRN  dextrose 50%, 12.5 g, PRN  dextrose 50%, 25 g, PRN  glucagon (human recombinant), 1 mg, PRN  glucose, 16 g, PRN  glucose, 24 g, PRN  HYDROcodone-acetaminophen, 1 tablet, Q6H PRN  magic mouthwash (diphenhydrAMINE 12.5 mg/5 mL 20 mL, aluminum & magnesium hydroxide-simethicone (MYLANTA) 20 mL, LIDOcaine HCl 2% (XYLOCAINE) 20 mL) solution, 10 mL, Q4H PRN  melatonin, 6 mg, Nightly PRN  methocarbamoL, 500 mg, TID PRN  morphine, 2 mg, Q4H PRN  ondansetron, 4 mg, Q8H PRN  sodium chloride 0.9%, 10 mL, PRN      Chart check completed. Will continue plan of care.      Orientation: oriented x 4  Anton Coma Scale Score: 15     Lead Monitored: Lead II Rhythm: normal sinus rhythm Frequency/Ectopy: PVCs  Cardiac/Telemetry Box Number: [id number]  VTE Core Measure: Pharmacological prophylaxis initiated/maintained Last Bowel Movement: 01/12/25  Diet Cardiac Standard Tray  Voiding  Characteristics: external catheter  Gilberto Score: 16  Fall Risk Score: 15  Accucheck []   Freq?      Lines/Drains/Airways       Drain  Duration             Female External Urinary Catheter w/ Suction 12/31/24 2230 14 days              Peripheral Intravenous Line  Duration                  Peripheral IV - Single Lumen 01/15/25 0645 20 G Anterior;Left;Proximal Forearm <1 day

## 2025-01-16 NOTE — PROGRESS NOTES
[facility] Medicine  Progress Note    Patient Name: Shireen Felix  MRN: [id number]  Patient Class: IP- Inpatient   Admission Date: 12/31/2024  Length of Stay: 14 days  Attending Physician: Kristopher Wynn MD  Primary Care Provider: Laron Chaudhari MD        Subjective     Principal Problem:COVID-19        HPI:  Shireen Felix is a 79 year old female who has  has a past medical history of Abnormal ECG, Acute bronchitis and bronchiolitis, Aneurysm, Anticoagulant long-term use, Arthritis, CAD in native artery, COPD (chronic obstructive pulmonary disease), Diabetes mellitus, Fall, Glaucoma, Hypertension, Seizures, Shingles, and Stroke who presented to Emergency Department for evaluation for cough. Associated symptoms include: congestion, wheezing, and SOB. She was seen by her pulmonologist today, Dr. Sam. Started as sinus pain and drainage and now has progressed to respiratory symptoms. She was seen a few days ago in the ER given prescription for antibiotics steroids but these have not helped much. Today in the office she can barely talk without coughing continually. She is having a difficult time putting more than 3 words together without severe cough paroxysms. ED workup showed Hgb/Hct 11.9/37.0, K+ 3.1, CO2 16, BUN/creatinine 34/1.5. CT chest w/out contrast stable suspected pulmonary fibrosis of the lungs. No acute infiltrates. She has received DuoNeb x 1, solumedrol 125 mg IV x 1, KCL 40 mEq PO x 1 and NS 1L bolus. Pt admitted for COVID.     Overview/Hospital Course:  Admitted to Hospital Medicine for acute on chronic respiratory failure concerns in setting of COVID-19.  Started on IV methylprednisolone, remdesivir, scheduled respiratory treatments. 01/04: Reported left lower quadrant abdominal pain with tenderness to palpation, will evaluate via CT as patient is on therapeutic anticoagulation for COVID-19.  CT abdomen/pelvis nonacute. 01/05:  Reported improvements in  respiratory status with decreased frequency of cough and dyspnea but still reporting significant symptoms from baseline.  On 1/6, patient had a significant event with acute tachypnea, tachycardia. Pulmonology consulted, orders adjusted. Patient stable overnight, had another event on 1/7 similar to previous day. Possible exacerbation related to anxiety provoked by medications and congestion. Case reviewed, orders adjusted. Per Pulmonology, due to underlying lung fibrosis, patient is likely not benefiting from bronchodilators. Stopped on 1/7, patient did not experience any further episodes of anxiety, respiratory distress. Will continue xanax and prn morphine. Patient with epistaxis, small clots in tissue noted. Reduced supplemental O2 to 1L at rest, stopped full dose lovenox and placed on ppx dose. Hgb stable.   Difficulty with discharge plan, patient originally planned to d/c to SNF, now prefers to d/c home.  Pulmonology discussed home palliative care vs hospice.  Patient and family wish to discuss the Services further.  Per discussion with patient, family will be available for an informational visit on Monday.  Educated patient on results of oxygen evaluation, advised patient to reduce oxygen to 2 L or less at rest, requires 4 L with exertion.  As clinically indicated if significant improvement noted will repeat oxygen evaluation.     01/12/2025  O2 sats stable on 2L (while at rest). Patient and family wants to try and go home, but unable to participate with PT over weekend. Repeat desat study on Monday. May have to consider palliative measures if progress plateaus.      01/13/2025  Feeling slightly better today than she felt a few days ago but overall still significantly short of breath with exertion.  Discussed with her, palliative Care, and her family.  She does not want to change to hospice at this time.  She wants to try to get better.  She does want to be DNR though.  Appreciate further palliative care  involvement.  Still desaturating significantly with exertion and unable to work with PT because of this    01/14/2025  Feeling perhaps slightly better today, attempting to work with physical therapy.  We are hoping that we could either get her to rehab if her oxygen requirements with exertion go down, or potentially get a bigger concentrator at home for increasing her up to 10 L with exertion at home.  Otherwise no changes to the plan    01/15/2025:  Still having coughing spells but notably improved.  Oxygen is being down titrated and she even got a room air today.  We are screening her for rehabs        Review of Systems   Constitutional:  Negative for fatigue and fever.   HENT: Negative.     Respiratory:  Positive for chest tightness, shortness of breath and wheezing.    Cardiovascular:  Negative for chest pain and leg swelling.   Gastrointestinal:  Negative for abdominal pain, constipation, diarrhea and nausea.   Genitourinary:  Negative for difficulty urinating and dysuria.   Musculoskeletal: Negative.    Skin:  Negative for rash.   Neurological:  Negative for light-headedness and headaches.   Hematological:  Does not bruise/bleed easily.   Psychiatric/Behavioral:  Negative for agitation and behavioral problems.      Objective:     Vital Signs (Most Recent):  Temp: 98.3 °F (36.8 °C) (01/15/25 1546)  Pulse: 86 (01/15/25 1700)  Resp: 18 (01/15/25 1546)  BP: (!) 114/54 (01/15/25 1546)  SpO2: 98 % (01/15/25 1546) Vital Signs (24h Range):  Temp:  [97.4 °F (36.3 °C)-99.3 °F (37.4 °C)] 98.3 °F (36.8 °C)  Pulse:  [73-97] 86  Resp:  [16-20] 18  SpO2:  [93 %-100 %] 98 %  BP: (102-121)/(46-72) 114/54     Weight: 53.3 kg (117 lb 8.1 oz)  Body mass index is 20.17 kg/m².    Intake/Output Summary (Last 24 hours) at 1/15/2025 1807  Last data filed at 1/15/2025 1208  Gross per 24 hour   Intake 408.76 ml   Output 1200 ml   Net -791.24 ml         Physical Exam  Constitutional:       General: She is not in acute distress.      Comments: Cachectic, ill appearing   HENT:      Head: Normocephalic and atraumatic.      Mouth/Throat:      Mouth: Mucous membranes are moist.      Pharynx: Oropharynx is clear.   Eyes:      General: No scleral icterus.  Cardiovascular:      Rate and Rhythm: Normal rate and regular rhythm.      Heart sounds: No murmur heard.     No gallop.   Pulmonary:      Breath sounds: Wheezing present.      Comments: Expiratory wheezing throughout, mild  Abdominal:      General: Bowel sounds are normal. There is no distension.      Tenderness: There is no abdominal tenderness.   Musculoskeletal:      Right lower leg: No edema.      Left lower leg: No edema.   Skin:     General: Skin is warm and dry.   Neurological:      Mental Status: She is alert and oriented to person, place, and time. Mental status is at baseline.   Psychiatric:         Mood and Affect: Mood normal.         Behavior: Behavior normal.             Significant Labs: All pertinent labs within the past 24 hours have been reviewed.    Significant Imaging: I have reviewed all pertinent imaging results/findings within the past 24 hours.    Assessment and Plan     * COVID-19  Patient is identified as Severe COVID-19 based on hypoxemia with O2 saturations <94% on room air or on ambulation   Initiate standard COVID protocols; COVID-19 testing ,Infection Control notification  and isolation- respiratory, contact and droplet per protocol    Diagnostics: CBC, CMP, Ferritin, CRP, Troponin, and Portable CXR    Management: Initiate targeted therapy with Remdesivir, 200mg IV x1, followed by 100mg IV daily x5 days total and Anticoagulation, Patient admitted to non-critical care unit- Will initiate full dose anticoagulation with Weight based lovenox 1mg/kg IV q12, stopped on 1/11/25, Maintain oxygen saturations 92-96% via Nasal Cannula  and monitor with continuous/intermittent pulse oximetry. , and Continuous cardiac monitoring.    Encounter for palliative care  Palliative Care  is following.  Receiving hydrocodone/acetaminophen p.r.n., morphine p.r.n., alprazolam p.r.n. for respiratory distress with good effect      Epistaxis  Likely secondary to supplemental O2 and full anticoagulation    --Stop full dose lovenox on 1/11  --Recommend humidified O2 when in use  --mupirocin in bilateral nares      Acute on chronic respiratory failure with hypoxia  Patient with Hypoxic Respiratory failure which is Acute on chronic.  she is not on home oxygen. Supplemental oxygen was provided and noted- Oxygen Concentration (%):  [28] 28    .   Signs/symptoms of respiratory failure include- tachypnea, increased work of breathing, respiratory distress, use of accessory muscles, wheezing, stridor, and lethargy. Contributing diagnoses includes - Interstitial lung disease Labs and images were reviewed. Patient Has recent ABG, which has been reviewed. Will treat underlying causes and adjust management of respiratory failure as follows-     Home Oxygen evaluation performed:  Home Oxygen Evaluation - [facility] - Cardiopulmonary Department      Date Performed: 1/8/2025     1)Patient's Home O2 Sat on room air, while at rest: Room Air SpO2 At Rest: 99 %                             If O2 sats on room air at rest are 88% or below, patient qualifies.  Document O2 liter flow needed in Step 2.  If O2 sats are 89% or above, complete Step 3.        2)         If patient is not ambulated and O2 sats are <88%, what is the O2 liter flow required to meet ordered saturation? Home O2 Eval Comments: pt qualifies for home oxygen     If O2 sats on room air while exercising remain 89% or above patient does not qualify, no further testing needed Document N/A in step 3. If O2 sats on room air while exercising are 88% or below, continue to Step 4.     3)Patient's O2 Sat on room air while exercising: Room Air SpO2 During Ambulation: (!) 78 %                             4)Patient's O2 Sat while exercising on O2: SpO2 During  "Ambulation on O2: 96 % at Ambulation O2 LPM: 4 LPM                             (Must show improvement from #4 for patients to qualify)    Patient qualifies for Home O2 @4L NC due to exertional dyspnea, hypoxia    Hypokalemia  Patient's most recent potassium results are listed below.   Recent Labs     01/08/25  0604 01/09/25  0508 01/10/25  0531   K 4.3 4.9 4.6       Plan  - Replete potassium per protocol  - Monitor potassium Daily  - Patient's hypokalemia is  being treated. Received KCL 40 mEq PO x 1 in ED  -  Stable    CKD (chronic kidney disease)  Creatine stable for now. BMP reviewed- noted Estimated Creatinine Clearance: 31.5 mL/min (based on SCr of 1.2 mg/dL). according to latest data. Based on current GFR, CKD stage is stage 4 - GFR 15-29.  Monitor UOP and serial BMP and adjust therapy as needed. Renally dose meds. Avoid nephrotoxic medications and procedures.  -Creatinine 1.5 on admission however 3 months ago was 1.2    Recent Labs     01/11/25  0557 01/12/25  0525 01/13/25  0506   CREATININE 1.0 1.2 1.2         -Due to GFR, will need to dc ranexa for now and decrease dose of gabapentin  -Hold spironolactone   -Continue Entresto   -Monitor creatinine  -stable    Chronic systolic heart failure  Patient has Diastolic (HFpEF) heart failure that is Chronic. On presentation their CHF was well compensated. Most recent BNP and echo results are listed below.  No results for input(s): "BNP" in the last 72 hours.    Latest ECHO  Results for orders placed during the hospital encounter of 06/02/23    Echo    Interpretation Summary  · The left ventricle is normal in size with concentric hypertrophy and low normal systolic function.  · The estimated ejection fraction is 50%.  · Normal left ventricular diastolic function.  · There is abnormal septal wall motion consistent with left bundle branch block.  · Normal right ventricular size with normal right ventricular systolic function.  · Normal central venous pressure (3 " mmHg).  · The estimated PA systolic pressure is 30 mmHg.  · Mild mitral regurgitation.    Current Heart Failure Medications  sacubitriL-valsartan 24-26 mg per tablet 1 tablet, 2 times daily, Oral  furosemide tablet 20 mg, Daily, Oral    Plan  - Monitor strict I&Os and daily weights.    - Place on telemetry  - Low sodium diet  - Place on fluid restriction of 1.5 L.   - Cardiology has not been consulted  - The patient's volume status is at their baseline  - Repeat ECHO, EF improved  -stop IV Lasix, resume home dose p.o. Lasix           Mixed hyperlipidemia  Lab Results   Component Value Date    CHOL 112 (L) 07/12/2024    HDL 41 07/12/2024    LDLCALC 49.8 (L) 07/12/2024    TRIG 106 07/12/2024     -Continue Statin    Normocytic anemia  Anemia is likely due to chronic disease due to Chronic Kidney Disease. Most recent hemoglobin and hematocrit are listed below.  Recent Labs     01/13/25  0506 01/14/25  0533 01/15/25  0457   HGB 11.3* 11.3* 10.7*   HCT 36.7* 37.1 34.7*       Plan  - Monitor serial CBC: Daily  - Transfuse PRBC if patient becomes hemodynamically unstable, symptomatic or H/H drops below 7/21.  - Patient has not received any PRBC transfusions to date  - Patient's anemia is currently stable    Seizure  Hx of seizures, patient has not had a seizure during this admission.     -Continue Keppra  -Seizure precautions      Coronary artery disease of native artery of native heart with stable angina pectoris  Patient with known CAD, which is controlled Will continue Plavix and Statin and monitor for S/Sx of angina/ACS. Continue to monitor on telemetry.     UIP (usual interstitial pneumonitis)  Pulm fibrosis hx- followed by pulmonology outpatient  -Will need close pulm f/u after discharge  -we have been discussing possible transitioned to hospice, but now she is improving in his on room air at times, and plan for patient to either go to rehab or home with home health        GERD (gastroesophageal reflux  disease)  -Continue PPI        VTE Risk Mitigation (From admission, onward)           Ordered     enoxaparin injection 40 mg  Every 24 hours         01/11/25 1203                    Discharge Planning   KATHERINE:      Code Status: DNR   Medical Readiness for Discharge Date:   Discharge Plan A: Inpatient Hospice   Discharge Delays: None known at this time            Kristopher Wynn MD  Department of Hospital Medicine   [place] - Telemetry ([facility])

## 2025-01-16 NOTE — PLAN OF CARE
Pt met in supine attempting to eat breakfast in reclined position  Assisted patient to bedside chair   Supine to sit  CGA  Seated scoot CGA with increased time  Wincing from soreness from fall previous night  Sit to stand with max A x2 HHA  Stand pivot transfer to bedside chair max A x2 HHA  Educated patient on up with assistance only.   Call don't fall procedures.  AVASYS present  Chair alarm on    Recommend moderate intensity therapy at DC

## 2025-01-16 NOTE — ASSESSMENT & PLAN NOTE
"Patient has Diastolic (HFpEF) heart failure that is Chronic. On presentation their CHF was well compensated. Most recent BNP and echo results are listed below.  No results for input(s): "BNP" in the last 72 hours.    Latest ECHO  Results for orders placed during the hospital encounter of 06/02/23    Echo    Interpretation Summary  · The left ventricle is normal in size with concentric hypertrophy and low normal systolic function.  · The estimated ejection fraction is 50%.  · Normal left ventricular diastolic function.  · There is abnormal septal wall motion consistent with left bundle branch block.  · Normal right ventricular size with normal right ventricular systolic function.  · Normal central venous pressure (3 mmHg).  · The estimated PA systolic pressure is 30 mmHg.  · Mild mitral regurgitation.    Current Heart Failure Medications  sacubitriL-valsartan 24-26 mg per tablet 1 tablet, 2 times daily, Oral  furosemide tablet 20 mg, Daily, Oral  spironolactone tablet 50 mg, Daily, Oral    Plan  - Monitor strict I&Os and daily weights.    - Place on telemetry  - Low sodium diet  - Place on fluid restriction of 1.5 L.   - Cardiology has not been consulted  - The patient's volume status is at their baseline  - Repeat ECHO, EF improved  - stop IV Lasix, resume home dose p.o. Lasix         "

## 2025-01-16 NOTE — ASSESSMENT & PLAN NOTE
Anemia is likely due to chronic disease due to Chronic Kidney Disease. Most recent hemoglobin and hematocrit are listed below.  Recent Labs     01/13/25  0506 01/14/25  0533 01/15/25  0457   HGB 11.3* 11.3* 10.7*   HCT 36.7* 37.1 34.7*       Plan  - Monitor serial CBC: Daily  - Transfuse PRBC if patient becomes hemodynamically unstable, symptomatic or H/H drops below 7/21.  - Patient has not received any PRBC transfusions to date  - Patient's anemia is currently stable

## 2025-01-16 NOTE — SUBJECTIVE & OBJECTIVE
Review of Systems   Constitutional:  Negative for fatigue and fever.   HENT: Negative.     Respiratory:  Positive for chest tightness, shortness of breath and wheezing.    Cardiovascular:  Negative for chest pain and leg swelling.   Gastrointestinal:  Negative for abdominal pain, constipation, diarrhea and nausea.   Genitourinary:  Negative for difficulty urinating and dysuria.   Musculoskeletal: Negative.    Skin:  Negative for rash.   Neurological:  Negative for light-headedness and headaches.   Hematological:  Does not bruise/bleed easily.   Psychiatric/Behavioral:  Negative for agitation and behavioral problems.      Objective:     Vital Signs (Most Recent):  Temp: 98.3 °F (36.8 °C) (01/16/25 1151)  Pulse: 82 (01/16/25 1151)  Resp: 20 (01/16/25 1151)  BP: (!) 110/55 (01/16/25 1151)  SpO2: 98 % (01/16/25 1151) Vital Signs (24h Range):  Temp:  [98.3 °F (36.8 °C)-101.4 °F (38.6 °C)] 98.3 °F (36.8 °C)  Pulse:  [82-97] 82  Resp:  [15-20] 20  SpO2:  [94 %-100 %] 98 %  BP: ()/(46-56) 110/55     Weight: 53.5 kg (117 lb 15.1 oz)  Body mass index is 20.25 kg/m².  No intake or output data in the 24 hours ending 01/16/25 1335        Physical Exam  Constitutional:       General: She is not in acute distress.     Comments: Cachectic, ill appearing   HENT:      Head: Normocephalic and atraumatic.      Mouth/Throat:      Mouth: Mucous membranes are moist.      Pharynx: Oropharynx is clear.   Eyes:      General: No scleral icterus.  Cardiovascular:      Rate and Rhythm: Normal rate and regular rhythm.      Heart sounds: No murmur heard.     No gallop.   Pulmonary:      Breath sounds: Wheezing present.      Comments: Expiratory wheezing throughout, mild  Abdominal:      General: Bowel sounds are normal. There is no distension.      Tenderness: There is no abdominal tenderness.   Musculoskeletal:      Right lower leg: No edema.      Left lower leg: No edema.   Skin:     General: Skin is warm and dry.   Neurological:       Mental Status: She is alert and oriented to person, place, and time. Mental status is at baseline.   Psychiatric:         Mood and Affect: Mood normal.         Behavior: Behavior normal.             Significant Labs: All pertinent labs within the past 24 hours have been reviewed.    Significant Imaging: I have reviewed all pertinent imaging results/findings within the past 24 hours.

## 2025-01-16 NOTE — PT/OT/SLP PROGRESS
Occupational Therapy   Treatment    Name: Shireen Felix  MRN: [id number]  Admitting Diagnosis:  COVID-19       Recommendations:     Discharge Recommendations: Moderate Intensity Therapy  Discharge Equipment Recommendations:  to be determined by next level of care  Barriers to discharge:  Decreased caregiver support    Assessment:     Shireen Felix is a 79 y.o. female with a medical diagnosis of COVID-19.Performance deficits affecting function are weakness, impaired balance, decreased safety awareness, impaired endurance, impaired self care skills, decreased upper extremity function, gait instability, decreased lower extremity function, decreased ROM, pain, impaired functional mobility.     Rehab Prognosis:  Good; patient would benefit from acute skilled OT services to address these deficits and reach maximum level of function.       Plan:     Patient to be seen 2 x/week to address the above listed problems via self-care/home management, therapeutic activities, therapeutic exercises  Plan of Care Expires: 01/17/25  Plan of Care Reviewed with: patient    Subjective     Chief Complaint: Soreness from fall on previous night   Patient/Family Comments/goals: Increase independence  Pain/Comfort:  Pain Rating 1: 6/10 (face scale- soreness to L foot, R shoulder from fall)  Location - Side 1: Right  Location 1: shoulder (and L foot)  Pain Addressed 1: Reposition, Distraction  Pain Rating Post-Intervention 1: 5/10    Objective:     Communicated with: Nurse prior to session.  Patient found supine with telemetry, peripheral IV, PureWick, bed alarm (AVASYS) upon OT entry to room.    General Precautions: Standard, airborne, contact, droplet, fall, respiratory    Orthopedic Precautions:N/A  Braces: N/A  Respiratory Status: Room air     Occupational Performance:     Bed Mobility:    Patient completed Rolling/Turning to Right with contact guard assistance  Patient completed Scooting/Bridging with contact guard  "assistance  Patient completed Supine to Sit with contact guard assistance     Functional Mobility/Transfers:  Patient completed Sit <> Stand Transfer with maximal assistance and of 2 persons  with  hand-held assist   Patient completed Bed <> Chair Transfer using Stand Pivot technique with maximal assistance and of 2 persons with hand-held assist  Functional Mobility: Unable due to BLEs weakness, knees buckling     Activities of Daily Living:  Lower Body Dressing: [employer]  socks       [facility] 6 Click ADL: 17    Treatment & Education:  Pt required increased time for all activities. Pt stating soreness "everywhere" from fall on previous night. Educated patient on importance of performing BUE AROM to tolerance to prevent stiffness from developing. Pt verbalized understanding.   Pt bed mobility CGA  Seated scoot CGA  Pt stood with bilateral HHA max A x2, then completed stand pivot transfer with max A x2 HHA to bedside chair. Pt propped upright with pillow behind back. Breakfast tray positioned in front of patient so she can finish eating.   Pt educated on importance of using call button for nursing assistance when ready to return to bed. Pt verbalized understanding.     Patient left up in chair with all lines intact, call button in reach, chair alarm on, and nurse notified    GOALS:   Multidisciplinary Problems       Occupational Therapy Goals          Problem: Occupational Therapy    Goal Priority Disciplines Outcome Interventions   Occupational Therapy Goal     OT, PT/OT Progressing    Description: O.T GOALS MET BY 1-17-25  PT WILL TOLERATE  1 SET X 12 REPS B UE ROM EXERCISE  S WITH UE DRESSING  S WITH LE DRESSING  S WITH TOILET TRANSFERS                         DME Justifications:   Shireen requires a commode for home use because she is confined to a single room.   Shireen's mobility limitation cannot be sufficiently resolved by the use of a cane. Her functional mobility deficit can be sufficiently resolved " with the use of a Rolling Walker. Patient's mobility limitation significantly impairs their ability to participate in one of more activities of daily living.  The use of a RW will significantly improve the patient's ability to participate in MRADLS and the patient will use it on regular basis in the home.    Time Tracking:     OT Date of Treatment: 01/16/25  OT Start Time: 0815  OT Stop Time: 0840  OT Total Time (min): 25 min    Billable Minutes:Therapeutic Activity 25    HUMZA Grayson  OT/CORINA: OT     Number of CORINA visits since last OT visit: 2    1/16/2025

## 2025-01-16 NOTE — ASSESSMENT & PLAN NOTE
Pulm fibrosis hx- followed by pulmonology outpatient  -Will need close pulm f/u after discharge  -we have been discussing possible transitioned to hospice, but now she is improving in his on room air at times, and plan for patient to either go to rehab

## 2025-01-16 NOTE — PLAN OF CARE
A223/A223 OLU Felix is a 79 y.o.female admitted on 12/31/2024 for COVID-19   Code Status: DNR MRN: [id number]   Review of patient's allergies indicates:   Allergen Reactions    Penicillins Anaphylaxis, Hives, Shortness Of Breath and Swelling    Penicillin     Adhesive Rash    Doxycycline Nausea Only    Oxycodone Other (See Comments)     Fatigue      Sulfa (sulfonamide antibiotics) Itching     Past Medical History:   Diagnosis Date    Abnormal ECG 08/05/2019    Acute bronchitis and bronchiolitis 12/31/2024    Aneurysm     Anticoagulant long-term use     Arthritis     CAD in native artery 08/05/2019    Diabetes mellitus     Fall 10/10/2019    Formatting of this note might be different from the original. Found sitting on floor next to bed last night Mild confusion today Does not recall falling or how she ended up on floor UA today Labs yesterday unremarkable    Glaucoma     Hypertension     Pulmonary fibrosis     Seizures     Shingles 05/27/2017    Stroke       PRN meds    acetaminophen, 650 mg, Q6H PRN  albuterol-ipratropium, 3 mL, Q4H PRN  dextrose 50%, 12.5 g, PRN  dextrose 50%, 25 g, PRN  glucagon (human recombinant), 1 mg, PRN  glucose, 16 g, PRN  glucose, 24 g, PRN  HYDROcodone-acetaminophen, 1 tablet, Q6H PRN  magic mouthwash (diphenhydrAMINE 12.5 mg/5 mL 20 mL, aluminum & magnesium hydroxide-simethicone (MYLANTA) 20 mL, LIDOcaine HCl 2% (XYLOCAINE) 20 mL) solution, 10 mL, Q4H PRN  melatonin, 6 mg, Nightly PRN  methocarbamoL, 500 mg, TID PRN  morphine, 2 mg, Q4H PRN  ondansetron, 4 mg, Q8H PRN  sodium chloride 0.9%, 10 mL, PRN      Chart check completed. Will continue plan of care.      Orientation: oriented x 4  Anton Coma Scale Score: 15     Lead Monitored: Lead II Rhythm: normal sinus rhythm Frequency/Ectopy: PVCs  Cardiac/Telemetry Box Number: [id number]  VTE Core Measure: Pharmacological prophylaxis initiated/maintained Last Bowel Movement: 01/12/25  Diet Cardiac Standard Tray  Voiding  Characteristics: external catheter  Gilberto Score: 16  Fall Risk Score: 19  Accucheck []   Freq?      Lines/Drains/Airways       Drain  Duration             Female External Urinary Catheter w/ Suction 12/31/24 2230 15 days              Peripheral Intravenous Line  Duration                  Peripheral IV - Single Lumen 01/15/25 0645 20 G Anterior;Left;Proximal Forearm <1 day

## 2025-01-16 NOTE — ASSESSMENT & PLAN NOTE
Pulm fibrosis hx- followed by pulmonology outpatient  -Will need close pulm f/u after discharge  -we have been discussing possible transitioned to hospice, but now she is improving in his on room air at times, and plan for patient to either go to rehab or home with home health

## 2025-01-16 NOTE — PLAN OF CARE
01/16/25 0935   Post-Acute Status   Post-Acute Authorization Placement  (SNF)   Post-Acute Placement Status Referrals Sent   Coverage [employer]   Discharge Delays None known at this time   Discharge Plan   Discharge Plan A Skilled Nursing Facility       SW reached out to Patient's daughter, Ariana, regarding discharge plan. Patient's daughter stated they were still open to SNF placement for patient to get therapy and get stronger.   SW stated that Patient expressed she wished to go to a facility in [place].   SW stated Patient has been accepted by [facility], in [place] and [facility], in [place]. Patient's daughter stated she would reach out to her sister and Patient's brother to discuss DC plan.    14 38 SW attempted to contact Patient's daughter Ariana, regarding SNF choice between  and [place]. Patient's daughter did not answer, but SW was able to leave voicemail will call back number.     16 00 SW received a call from patient's daughter regarding SNF placement. Patient's daughter, Ariana, stated she was at work on her bus route, and could not talk at the time. Ariana stated her sister was doing the research on the SNF's and she has not gotten a chance to talk to her yet. PARTHA stated that we would need a choice today regarding [facility] vs. [facility] for SNF placement. Patient's daughter stated SW could attempt to reach her sister to see if she had researched the facilities. PARTHA verified phone number for Trista, and Ariana confirmed number was correct.   PARTHA attempted to contact patient's daughter, Trista, who's phone number was disconnected or not in service.    16 15 Patient's daughter, Ariana, called SW back regarding SNF placement. Patient's daughter Ariana, stated her sister, Trista, would be up at bedside tomorrow 8 am and then would go tour [facility] of . Patient's daughter, Ariana, stated they had bad previous experiences at SNF's in  [place] and just want to check out facility prior to Patient's DC. SW confirmed that this would just be to check facility and inquired about if they wished to send Patient to [facility]. Patient's daughter stated it would just be to check out facility and they do not think they would send Patient to [facility]. SW verbalized understanding and inquired about new phone number for patient's daughter, Trista. Trista's new phone number is [phone]. SW will update phone number in the chart.     SW will continue to follow and assist as needed.

## 2025-01-16 NOTE — ASSESSMENT & PLAN NOTE
Creatine stable for now. BMP reviewed- noted Estimated Creatinine Clearance: 31.6 mL/min (based on SCr of 1.2 mg/dL). according to latest data. Based on current GFR, CKD stage is stage 4 - GFR 15-29.  Monitor UOP and serial BMP and adjust therapy as needed. Renally dose meds. Avoid nephrotoxic medications and procedures.  -Creatinine 1.5 on admission however 3 months ago was 1.2    Recent Labs     01/14/25  0533 01/15/25  0457 01/16/25  0526   CREATININE 1.0 1.2 1.2         -Due to GFR, will need to dc ranexa for now and decrease dose of gabapentin  -Held spironolactone now restarting 1/16/25  -Continue Entresto   -Monitor creatinine  -stable
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
## 2025-01-17 PROBLEM — J18.9 HOSPITAL ACQUIRED PNA: Status: ACTIVE | Noted: 2025-01-17

## 2025-01-17 LAB
ANION GAP SERPL CALC-SCNC: 7 MMOL/L (ref 8–16)
BASOPHILS # BLD AUTO: 0.02 K/UL (ref 0–0.2)
BASOPHILS NFR BLD: 0.3 % (ref 0–1.9)
BUN SERPL-MCNC: 65 MG/DL (ref 8–23)
CALCIUM SERPL-MCNC: 8.6 MG/DL (ref 8.7–10.5)
CHLORIDE SERPL-SCNC: 105 MMOL/L (ref 95–110)
CO2 SERPL-SCNC: 21 MMOL/L (ref 23–29)
CREAT SERPL-MCNC: 1.2 MG/DL (ref 0.5–1.4)
DIFFERENTIAL METHOD BLD: ABNORMAL
EOSINOPHIL # BLD AUTO: 0.1 K/UL (ref 0–0.5)
EOSINOPHIL NFR BLD: 1.5 % (ref 0–8)
ERYTHROCYTE [DISTWIDTH] IN BLOOD BY AUTOMATED COUNT: 14.4 % (ref 11.5–14.5)
EST. GFR  (NO RACE VARIABLE): 46 ML/MIN/1.73 M^2
GLUCOSE SERPL-MCNC: 120 MG/DL (ref 70–110)
HCT VFR BLD AUTO: 29.6 % (ref 37–48.5)
HGB BLD-MCNC: 9 G/DL (ref 12–16)
IMM GRANULOCYTES # BLD AUTO: 0.04 K/UL (ref 0–0.04)
IMM GRANULOCYTES NFR BLD AUTO: 0.7 % (ref 0–0.5)
LACTATE SERPL-SCNC: 0.5 MMOL/L (ref 0.5–2.2)
LYMPHOCYTES # BLD AUTO: 1.3 K/UL (ref 1–4.8)
LYMPHOCYTES NFR BLD: 20.6 % (ref 18–48)
MAGNESIUM SERPL-MCNC: 1.4 MG/DL (ref 1.6–2.6)
MCH RBC QN AUTO: 30.7 PG (ref 27–31)
MCHC RBC AUTO-ENTMCNC: 30.4 G/DL (ref 32–36)
MCV RBC AUTO: 101 FL (ref 82–98)
MONOCYTES # BLD AUTO: 0.8 K/UL (ref 0.3–1)
MONOCYTES NFR BLD: 13.1 % (ref 4–15)
NEUTROPHILS # BLD AUTO: 3.9 K/UL (ref 1.8–7.7)
NEUTROPHILS NFR BLD: 63.8 % (ref 38–73)
NRBC BLD-RTO: 0 /100 WBC
PHOSPHATE SERPL-MCNC: 2.8 MG/DL (ref 2.7–4.5)
PLATELET # BLD AUTO: 116 K/UL (ref 150–450)
PMV BLD AUTO: 10.9 FL (ref 9.2–12.9)
POTASSIUM SERPL-SCNC: 4.1 MMOL/L (ref 3.5–5.1)
RBC # BLD AUTO: 2.93 M/UL (ref 4–5.4)
SODIUM SERPL-SCNC: 133 MMOL/L (ref 136–145)
WBC # BLD AUTO: 6.13 K/UL (ref 3.9–12.7)

## 2025-01-17 PROCEDURE — 25000003 PHARM REV CODE 250: Performed by: NURSE PRACTITIONER

## 2025-01-17 PROCEDURE — 85025 COMPLETE CBC W/AUTO DIFF WBC: CPT | Performed by: STUDENT IN AN ORGANIZED HEALTH CARE EDUCATION/TRAINING PROGRAM

## 2025-01-17 PROCEDURE — C1751 CATH, INF, PER/CENT/MIDLINE: HCPCS

## 2025-01-17 PROCEDURE — 25000003 PHARM REV CODE 250: Performed by: STUDENT IN AN ORGANIZED HEALTH CARE EDUCATION/TRAINING PROGRAM

## 2025-01-17 PROCEDURE — 80048 BASIC METABOLIC PNL TOTAL CA: CPT | Performed by: STUDENT IN AN ORGANIZED HEALTH CARE EDUCATION/TRAINING PROGRAM

## 2025-01-17 PROCEDURE — 25000242 PHARM REV CODE 250 ALT 637 W/ HCPCS: Performed by: STUDENT IN AN ORGANIZED HEALTH CARE EDUCATION/TRAINING PROGRAM

## 2025-01-17 PROCEDURE — 87040 BLOOD CULTURE FOR BACTERIA: CPT | Mod: 59 | Performed by: STUDENT IN AN ORGANIZED HEALTH CARE EDUCATION/TRAINING PROGRAM

## 2025-01-17 PROCEDURE — 99900035 HC TECH TIME PER 15 MIN (STAT)

## 2025-01-17 PROCEDURE — 84100 ASSAY OF PHOSPHORUS: CPT | Performed by: STUDENT IN AN ORGANIZED HEALTH CARE EDUCATION/TRAINING PROGRAM

## 2025-01-17 PROCEDURE — 83605 ASSAY OF LACTIC ACID: CPT | Performed by: STUDENT IN AN ORGANIZED HEALTH CARE EDUCATION/TRAINING PROGRAM

## 2025-01-17 PROCEDURE — 97530 THERAPEUTIC ACTIVITIES: CPT

## 2025-01-17 PROCEDURE — 83735 ASSAY OF MAGNESIUM: CPT | Performed by: STUDENT IN AN ORGANIZED HEALTH CARE EDUCATION/TRAINING PROGRAM

## 2025-01-17 PROCEDURE — 63600175 PHARM REV CODE 636 W HCPCS: Performed by: STUDENT IN AN ORGANIZED HEALTH CARE EDUCATION/TRAINING PROGRAM

## 2025-01-17 PROCEDURE — 94640 AIRWAY INHALATION TREATMENT: CPT

## 2025-01-17 PROCEDURE — 36410 VNPNXR 3YR/> PHY/QHP DX/THER: CPT

## 2025-01-17 PROCEDURE — 25000003 PHARM REV CODE 250: Performed by: HOSPITALIST

## 2025-01-17 PROCEDURE — 25000003 PHARM REV CODE 250: Performed by: FAMILY MEDICINE

## 2025-01-17 PROCEDURE — 97535 SELF CARE MNGMENT TRAINING: CPT

## 2025-01-17 PROCEDURE — 36415 COLL VENOUS BLD VENIPUNCTURE: CPT | Performed by: STUDENT IN AN ORGANIZED HEALTH CARE EDUCATION/TRAINING PROGRAM

## 2025-01-17 PROCEDURE — 27000221 HC OXYGEN, UP TO 24 HOURS

## 2025-01-17 PROCEDURE — 21400001 HC TELEMETRY ROOM

## 2025-01-17 PROCEDURE — 27000207 HC ISOLATION

## 2025-01-17 PROCEDURE — 94761 N-INVAS EAR/PLS OXIMETRY MLT: CPT

## 2025-01-17 PROCEDURE — 63600175 PHARM REV CODE 636 W HCPCS: Performed by: NURSE PRACTITIONER

## 2025-01-17 RX ORDER — SODIUM,POTASSIUM PHOSPHATES 280-250MG
2 POWDER IN PACKET (EA) ORAL
Status: COMPLETED | OUTPATIENT
Start: 2025-01-17 | End: 2025-01-17

## 2025-01-17 RX ORDER — IPRATROPIUM BROMIDE 0.5 MG/2.5ML
0.5 SOLUTION RESPIRATORY (INHALATION) EVERY 4 HOURS PRN
Status: DISCONTINUED | OUTPATIENT
Start: 2025-01-17 | End: 2025-01-28 | Stop reason: HOSPADM

## 2025-01-17 RX ORDER — MAGNESIUM SULFATE HEPTAHYDRATE 40 MG/ML
2 INJECTION, SOLUTION INTRAVENOUS
Status: COMPLETED | OUTPATIENT
Start: 2025-01-17 | End: 2025-01-17

## 2025-01-17 RX ORDER — BUDESONIDE 0.5 MG/2ML
0.5 INHALANT ORAL EVERY 12 HOURS
Status: DISCONTINUED | OUTPATIENT
Start: 2025-01-17 | End: 2025-01-28 | Stop reason: HOSPADM

## 2025-01-17 RX ORDER — PROMETHAZINE HYDROCHLORIDE, PHENYLEPHRINE HYDROCHLORIDE AND CODEINE PHOSPHATE 6.25; 5; 1 MG/5ML; MG/5ML; MG/5ML
5 SOLUTION ORAL EVERY 6 HOURS PRN
Status: DISCONTINUED | OUTPATIENT
Start: 2025-01-17 | End: 2025-01-17

## 2025-01-17 RX ORDER — ALPRAZOLAM 0.25 MG/1
0.25 TABLET ORAL 3 TIMES DAILY PRN
Status: DISCONTINUED | OUTPATIENT
Start: 2025-01-17 | End: 2025-01-28 | Stop reason: HOSPADM

## 2025-01-17 RX ORDER — MAGNESIUM SULFATE HEPTAHYDRATE 40 MG/ML
4 INJECTION, SOLUTION INTRAVENOUS ONCE
Status: DISCONTINUED | OUTPATIENT
Start: 2025-01-17 | End: 2025-01-17

## 2025-01-17 RX ORDER — CEFEPIME HYDROCHLORIDE 2 G/1
2 INJECTION, POWDER, FOR SOLUTION INTRAVENOUS
Status: DISCONTINUED | OUTPATIENT
Start: 2025-01-17 | End: 2025-01-21

## 2025-01-17 RX ORDER — PREDNISONE 20 MG/1
40 TABLET ORAL DAILY
Status: COMPLETED | OUTPATIENT
Start: 2025-01-17 | End: 2025-01-21

## 2025-01-17 RX ORDER — ARFORMOTEROL TARTRATE 15 UG/2ML
15 SOLUTION RESPIRATORY (INHALATION) 2 TIMES DAILY
Status: DISCONTINUED | OUTPATIENT
Start: 2025-01-17 | End: 2025-01-28 | Stop reason: HOSPADM

## 2025-01-17 RX ADMIN — Medication 2 PACKET: at 10:01

## 2025-01-17 RX ADMIN — MAGNESIUM SULFATE HEPTAHYDRATE 2 G: 40 INJECTION, SOLUTION INTRAVENOUS at 04:01

## 2025-01-17 RX ADMIN — ACETAMINOPHEN 650 MG: 325 TABLET ORAL at 03:01

## 2025-01-17 RX ADMIN — LEVETIRACETAM 1500 MG: 500 TABLET, FILM COATED ORAL at 09:01

## 2025-01-17 RX ADMIN — OXYCODONE HYDROCHLORIDE AND ACETAMINOPHEN 500 MG: 500 TABLET ORAL at 08:01

## 2025-01-17 RX ADMIN — PANTOPRAZOLE SODIUM 40 MG: 40 TABLET, DELAYED RELEASE ORAL at 09:01

## 2025-01-17 RX ADMIN — ATORVASTATIN CALCIUM 40 MG: 40 TABLET, FILM COATED ORAL at 09:01

## 2025-01-17 RX ADMIN — SODIUM CHLORIDE 500 ML: 9 INJECTION, SOLUTION INTRAVENOUS at 04:01

## 2025-01-17 RX ADMIN — OXYCODONE HYDROCHLORIDE AND ACETAMINOPHEN 500 MG: 500 TABLET ORAL at 09:01

## 2025-01-17 RX ADMIN — Medication 2 PACKET: at 03:01

## 2025-01-17 RX ADMIN — ESCITALOPRAM OXALATE 20 MG: 10 TABLET, FILM COATED ORAL at 09:01

## 2025-01-17 RX ADMIN — GUAIFENESIN 1200 MG: 600 TABLET, EXTENDED RELEASE ORAL at 08:01

## 2025-01-17 RX ADMIN — GABAPENTIN 200 MG: 100 CAPSULE ORAL at 09:01

## 2025-01-17 RX ADMIN — GUAIFENESIN 1200 MG: 600 TABLET, EXTENDED RELEASE ORAL at 09:01

## 2025-01-17 RX ADMIN — MAGNESIUM SULFATE HEPTAHYDRATE 2 G: 40 INJECTION, SOLUTION INTRAVENOUS at 11:01

## 2025-01-17 RX ADMIN — Medication 2 PACKET: at 09:01

## 2025-01-17 RX ADMIN — GABAPENTIN 200 MG: 100 CAPSULE ORAL at 03:01

## 2025-01-17 RX ADMIN — GABAPENTIN 200 MG: 100 CAPSULE ORAL at 08:01

## 2025-01-17 RX ADMIN — BUDESONIDE INHALATION 0.5 MG: 0.5 SUSPENSION RESPIRATORY (INHALATION) at 08:01

## 2025-01-17 RX ADMIN — CLOPIDOGREL BISULFATE 75 MG: 75 TABLET ORAL at 09:01

## 2025-01-17 RX ADMIN — ALPRAZOLAM 0.25 MG: 0.25 TABLET ORAL at 08:01

## 2025-01-17 RX ADMIN — BENZONATATE 100 MG: 100 CAPSULE ORAL at 09:01

## 2025-01-17 RX ADMIN — MORPHINE SULFATE 2 MG: 2 INJECTION, SOLUTION INTRAMUSCULAR; INTRAVENOUS at 09:01

## 2025-01-17 RX ADMIN — CEFEPIME 2 G: 2 INJECTION, POWDER, FOR SOLUTION INTRAVENOUS at 11:01

## 2025-01-17 RX ADMIN — BENZONATATE 100 MG: 100 CAPSULE ORAL at 08:01

## 2025-01-17 RX ADMIN — MONTELUKAST 10 MG: 10 TABLET, FILM COATED ORAL at 09:01

## 2025-01-17 RX ADMIN — ARFORMOTEROL TARTRATE 15 MCG: 15 SOLUTION RESPIRATORY (INHALATION) at 08:01

## 2025-01-17 RX ADMIN — THERA TABS 1 TABLET: TAB at 09:01

## 2025-01-17 RX ADMIN — SPIRONOLACTONE 50 MG: 25 TABLET ORAL at 09:01

## 2025-01-17 RX ADMIN — SODIUM CHLORIDE 500 ML: 9 INJECTION, SOLUTION INTRAVENOUS at 07:01

## 2025-01-17 RX ADMIN — PREDNISONE 40 MG: 20 TABLET ORAL at 03:01

## 2025-01-17 RX ADMIN — LEVETIRACETAM 1500 MG: 500 TABLET, FILM COATED ORAL at 08:01

## 2025-01-17 RX ADMIN — BENZONATATE 100 MG: 100 CAPSULE ORAL at 03:01

## 2025-01-17 RX ADMIN — FLUTICASONE PROPIONATE 100 MCG: 50 SPRAY, METERED NASAL at 09:01

## 2025-01-17 RX ADMIN — ENOXAPARIN SODIUM 40 MG: 40 INJECTION SUBCUTANEOUS at 04:01

## 2025-01-17 NOTE — ASSESSMENT & PLAN NOTE
Creatine stable for now. BMP reviewed- noted Estimated Creatinine Clearance: 31.6 mL/min (based on SCr of 1.2 mg/dL). according to latest data. Based on current GFR, CKD stage is stage 4 - GFR 15-29.  Monitor UOP and serial BMP and adjust therapy as needed. Renally dose meds. Avoid nephrotoxic medications and procedures.  -Creatinine 1.5 on admission however 3 months ago was 1.2    Recent Labs     01/15/25  0457 01/16/25  0526 01/17/25  0518   CREATININE 1.2 1.2 1.2         -Due to GFR, will need to dc ranexa for now and decrease dose of gabapentin  -Held spironolactone now restarting 1/16/25 (although will need to hold again for hypotension this AM)  -Continue Entresto   -Monitor creatinine  -stable

## 2025-01-17 NOTE — PLAN OF CARE
SW contacted patient's daughter, Trista, regarding SNF placement. Patient's daughter - she will plan to tour [facility] Monday and make decision. SW stated she would let liaisons know about family wishing to tour and daughter verbalized understanding.    PARTHA contacted Mayuri, with [facility], who stated she would be on the lookout for patient's daughter.    PARTHA will continue to follow and assist as needed.

## 2025-01-17 NOTE — ASSESSMENT & PLAN NOTE
Patient more lethargic, with worsening cough and shortness of breath.  CXR with possible left-sided infiltrate.  I suspect postviral pneumonia, hospital-acquired.  Blood and sputum cultures pending.  Lactate is normal.  Has a penicillin allergy.  Started on cefepime.  Providing supplemental oxygen and we will monitor closely

## 2025-01-17 NOTE — ASSESSMENT & PLAN NOTE
Pulm fibrosis hx- followed by pulmonology outpatient  -Will need close pulm f/u after discharge  -we have been discussing possible transitioned to hospice, but now she is improving and plan at this time is to get better, improve, and likely we will discharge her to SNF when medically ready

## 2025-01-17 NOTE — ASSESSMENT & PLAN NOTE
Hx of seizures, patient has not had a seizure during this admission.   At home is on seizure medication Aptiom 400mg daily and Keppra 1,000mg BID  Aptiom likely worsening hyponatremia and also inhibiting SNF placement due to cost, so discussed with neuro on call  Per neuro on call, would DC Aptiom and increase Keppra to 1,500mg BID, change made  Can follow up with neuro as outpatient to revisit

## 2025-01-17 NOTE — PHYSICIAN QUERY
Due to the conflicting clinical picture, please clinically validate the diagnosis. If validated, please provide additional clinical support for the diagnosis.  The respiratory condition has been ruled out and other diagnosis ruled in (please specify): Bronchitis, covid-19 infection

## 2025-01-17 NOTE — ASSESSMENT & PLAN NOTE
"Patient has Diastolic (HFpEF) heart failure that is Chronic. On presentation their CHF was well compensated. Most recent BNP and echo results are listed below.  No results for input(s): "BNP" in the last 72 hours.    Latest ECHO  Results for orders placed during the hospital encounter of 06/02/23    Echo    Interpretation Summary  · The left ventricle is normal in size with concentric hypertrophy and low normal systolic function.  · The estimated ejection fraction is 50%.  · Normal left ventricular diastolic function.  · There is abnormal septal wall motion consistent with left bundle branch block.  · Normal right ventricular size with normal right ventricular systolic function.  · Normal central venous pressure (3 mmHg).  · The estimated PA systolic pressure is 30 mmHg.  · Mild mitral regurgitation.    Current Heart Failure Medications  furosemide tablet 20 mg, Daily, Oral    Plan  - Monitor strict I&Os and daily weights.    - Place on telemetry  - Low sodium diet  - Place on fluid restriction of 1.5 L.   - Cardiology has not been consulted  - The patient's volume status is at their baseline  - Repeat ECHO, EF improved  - stop IV Lasix, resume home dose p.o. Lasix  -we are holding spironolactone, Entresto, metoprolol in the setting of sepsis and hypotension, can reintroduce them as she improves         "

## 2025-01-17 NOTE — PT/OT/SLP PROGRESS
Physical Therapy      Patient Name:  Shireen Felix   MRN:  [id number]    P.T COMPLETED CHART REVIEW AND HAD MEETING WITH TODAYS TX PTA. PT MET IN RM SUP IN BED. P.T. OBSERVED PT ROLLING IN BED WITH SBA. P.T. DISCUSSED TX SESSION TODAY, DISCUSSED GOALS OF PT. PT REPORTED MAIN GOAL TO RETURN TO PLOF AND BE ABLE TO WALK AGAIN WITH RW ON HER OWN. P.T. EDUCATED PT AND FAMILY ON IMPORTANCE OF PARTICIPATION IN SKILLED P.T. AS WELL AS CALLING FOR ASSISTANCE TO SIT FOR ALL MEALS. PT AND FAMILY REPORTED UNDERSTANDING. PT LEFT WITH ALL NEEDS MET Makeda Evans, PT 1/17/2025

## 2025-01-17 NOTE — PT/OT/SLP PROGRESS
Physical Therapy  Treatment    Shireen Felix   MRN: [id number]   Admitting Diagnosis: COVID-19    PT Received On: 01/17/25  PT Start Time: 0905     PT Stop Time: 0935    PT Total Time (min): 30 min       Billable Minutes:  Therapeutic Activity 30    Treatment Type: Treatment  PT/PTA: PTA     Number of PTA visits since last PT visit: 1       General Precautions: Standard, airborne, contact, droplet, fall  Orthopedic Precautions: N/A  Braces: N/A  Respiratory Status: Nasal cannula, flow 1 L/min    Spiritual, Cultural Beliefs, Confucianism Practices, Values that Affect Care: no    Subjective:  Communicated with patient's nurse, Genia, and completed Epic chart review prior to session.  Patient agreed to PT session.     Pain/Comfort  Pain Rating 1:  (UNRATED PAIN TO B FEET IN STANDING POSITION)  Pain Addressed 1: Other (see comments) (ACTIVITY PACING)    Objective:   Patient found with: peripheral IV, telemetry, bed alarm, Other (comments) (AVASYS)    Supine > sit EOB: Min A     Forward scoot towards EOB: Min A     Seated EOB x 10 min total focusing on increased tolerance to upright position, core stability, trunk control and CV endurance.   Maintained self supported sitting balance with SBA    STS from EOB w/ HHAx2: Mod A of 2 (reported pain in B feet with weightbearing; knees flexed throughout and unable to correct)    Stand pivot T/F to chair w/ HHAx2: Mod A of 2     Reviewed AROM TE to BLE including: hip flex/ext, knee flex/ext, ankle PF/DF  To be completed a minimum of 10 reps for each LE in order to promote return of function, strength and ROM.     Educated patient on importance of increased tolerance to upright position and direct impact on CV endurance and strength. Patient encouraged to sit up in chair/ EOB, for a minimum of 2 consecutive hours, 3x per day. Encouraged patient to perform AROM TE to BLE throughout the day within all available planes of motion. Re enforced importance of utilizing call light to  meet needs in room and not attempt to get up without staff assistance. Patient verbalized understanding and agreed to comply.       AM-PAC 6 CLICK MOBILITY  How much help from another person does this patient currently need?   1 = Unable, Total/Dependent Assistance  2 = A lot, Maximum/Moderate Assistance  3 = A little, Minimum/Contact Guard/Supervision  4 = None, Modified [place]/Independent    Turning over in bed (including adjusting bedclothes, sheets and blankets)?: 3  Sitting down on and standing up from a chair with arms (e.g., wheelchair, bedside commode, etc.): 2  Moving from lying on back to sitting on the side of the bed?: 3  Moving to and from a bed to a chair (including a wheelchair)?: 2  Need to walk in hospital room?: 1  Climbing 3-5 steps with a railing?: 1 (NT)  Basic Mobility Total Score: 12    AM-PAC Raw Score CMS G-Code Modifier Level of Impairment Assistance   6 % Total / Unable   7 - 9 CM 80 - 100% Maximal Assist   10 - 14 CL 60 - 80% Moderate Assist   15 - 19 CK 40 - 60% Moderate Assist   20 - 22 CJ 20 - 40% Minimal Assist   23 CI 1-20% SBA / CGA   24 CH 0% Independent/ Mod I     Patient left up in chair with call button in reach, chair alarm on, and daughter & AVASYS present.    Assessment:  Shireen Felix is a 79 y.o. female with a medical diagnosis of COVID-19 and presents with overall decline in functional mobility. Patient would continue to benefit from skilled PT to address functional limitations listed below in order to return to PLOF/decrease caregiver burden.      Rehab identified problem list/impairments: weakness, impaired endurance, impaired self care skills, impaired functional mobility, gait instability, impaired balance, decreased safety awareness, decreased lower extremity function, decreased upper extremity function, decreased coordination, impaired cognition, decreased ROM, impaired cardiopulmonary response to activity    Rehab potential is fair.    Activity  tolerance: Fair    Discharge recommendations: Moderate Intensity Therapy      Barriers to discharge:      Equipment recommendations: to be determined by next level of care     GOALS:   Multidisciplinary Problems       Physical Therapy Goals          Problem: Physical Therapy    Goal Priority Disciplines Outcome Interventions   Physical Therapy Goal     PT, PT/OT Progressing    Description: Goals to be met by 1/31/25.  1. Pt will complete bed mobility S.  2. Pt will complete sit to stand MIN A .  3. Pt will ambulate 50Ft WITH MIN A using RW.  4. Pt will increase AMPAC score by 2 points to progress functional mobility.                       DME Justifications:  No DME recommended requiring DME justifications    PLAN:    Patient to be seen 3 x/week to address the above listed problems via gait training, therapeutic activities, therapeutic exercises  Plan of Care expires: 01/17/25  Plan of Care reviewed with: patient, daughter         01/17/2025

## 2025-01-17 NOTE — ASSESSMENT & PLAN NOTE
Anemia is likely due to chronic disease due to Chronic Kidney Disease. Most recent hemoglobin and hematocrit are listed below.  Recent Labs     01/15/25  0457 01/16/25  0526 01/17/25  0518   HGB 10.7* 9.8* 9.0*   HCT 34.7* 31.7* 29.6*       Plan  - Monitor serial CBC: Daily  - Transfuse PRBC if patient becomes hemodynamically unstable, symptomatic or H/H drops below 7/21.  - Patient has not received any PRBC transfusions to date  - Patient's anemia is currently stable

## 2025-01-17 NOTE — PROGRESS NOTES
[facility] Medicine  Progress Note    Patient Name: Shireen Felix  MRN: [id number]  Patient Class: IP- Inpatient   Admission Date: 12/31/2024  Length of Stay: 16 days  Attending Physician: Kristopher Wynn MD  Primary Care Provider: Laron Chaudhari MD        Subjective     Principal Problem:COVID-19        HPI:  Shireen Felix is a 79 year old female who has  has a past medical history of Abnormal ECG, Acute bronchitis and bronchiolitis, Aneurysm, Anticoagulant long-term use, Arthritis, CAD in native artery, COPD (chronic obstructive pulmonary disease), Diabetes mellitus, Fall, Glaucoma, Hypertension, Seizures, Shingles, and Stroke who presented to Emergency Department for evaluation for cough. Associated symptoms include: congestion, wheezing, and SOB. She was seen by her pulmonologist today, Dr. Sam. Started as sinus pain and drainage and now has progressed to respiratory symptoms. She was seen a few days ago in the ER given prescription for antibiotics steroids but these have not helped much. Today in the office she can barely talk without coughing continually. She is having a difficult time putting more than 3 words together without severe cough paroxysms. ED workup showed Hgb/Hct 11.9/37.0, K+ 3.1, CO2 16, BUN/creatinine 34/1.5. CT chest w/out contrast stable suspected pulmonary fibrosis of the lungs. No acute infiltrates. She has received DuoNeb x 1, solumedrol 125 mg IV x 1, KCL 40 mEq PO x 1 and NS 1L bolus. Pt admitted for COVID.     Overview/Hospital Course:  Admitted to Hospital Medicine for acute on chronic respiratory failure concerns in setting of COVID-19.  Started on IV methylprednisolone, remdesivir, scheduled respiratory treatments. 01/04: Reported left lower quadrant abdominal pain with tenderness to palpation, will evaluate via CT as patient is on therapeutic anticoagulation for COVID-19.  CT abdomen/pelvis nonacute. 01/05:  Reported improvements in  respiratory status with decreased frequency of cough and dyspnea but still reporting significant symptoms from baseline.  On 1/6, patient had a significant event with acute tachypnea, tachycardia. Pulmonology consulted, orders adjusted. Patient stable overnight, had another event on 1/7 similar to previous day. Possible exacerbation related to anxiety provoked by medications and congestion. Case reviewed, orders adjusted. Per Pulmonology, due to underlying lung fibrosis, patient is likely not benefiting from bronchodilators. Stopped on 1/7, patient did not experience any further episodes of anxiety, respiratory distress. Will continue xanax and prn morphine. Patient with epistaxis, small clots in tissue noted. Reduced supplemental O2 to 1L at rest, stopped full dose lovenox and placed on ppx dose. Hgb stable.   Difficulty with discharge plan, patient originally planned to d/c to SNF, now prefers to d/c home.  Pulmonology discussed home palliative care vs hospice.  Patient and family wish to discuss the Services further.  Per discussion with patient, family will be available for an informational visit on Monday.  Educated patient on results of oxygen evaluation, advised patient to reduce oxygen to 2 L or less at rest, requires 4 L with exertion.  As clinically indicated if significant improvement noted will repeat oxygen evaluation.     01/12/2025  O2 sats stable on 2L (while at rest). Patient and family wants to try and go home, but unable to participate with PT over weekend. Repeat desat study on Monday. May have to consider palliative measures if progress plateaus.      01/13/2025  Feeling slightly better today than she felt a few days ago but overall still significantly short of breath with exertion.  Discussed with her, palliative Care, and her family.  She does not want to change to hospice at this time.  She wants to try to get better.  She does want to be DNR though.  Appreciate further palliative care  involvement.  Still desaturating significantly with exertion and unable to work with PT because of this    01/14/2025  Feeling perhaps slightly better today, attempting to work with physical therapy.  We are hoping that we could either get her to rehab if her oxygen requirements with exertion go down, or potentially get a bigger concentrator at home for increasing her up to 10 L with exertion at home.  Otherwise no changes to the plan    01/15/2025:  Still having coughing spells but notably improved.  Oxygen is being down titrated and she even got a room air today.  We are screening her for rehabs    01/16/2025:  Coughing she states is much worse when oxygen is off.  She was on room air but I put her on 1 L to see if there is truly an effect.  She is still being screened for rehabs.  Overall she looks much better.  She did technically have a fever today to 101.4 but when I evaluate her she looks notably better, CXR is negative for acute change, would not recommend starting antibiotics and she has defervesced    1/17/25: Somnolent this AM, spiking more fevers yesterday evening, and mildly soft BP this AM. Lactate wnl. Alert and oriented on my eval. CXR with ? Infiltrate, more cough, feeling worse. Will start ABX for PNA, blood cultures, sputum culture        Review of Systems   Constitutional:  Negative for fatigue and fever.        Overall feels worse today, more tired   HENT: Negative.     Respiratory:  Positive for chest tightness, shortness of breath and wheezing.         Cough again worsening today   Cardiovascular:  Negative for chest pain and leg swelling.   Gastrointestinal:  Negative for abdominal pain, constipation, diarrhea and nausea.   Genitourinary:  Negative for difficulty urinating and dysuria.   Musculoskeletal: Negative.    Skin:  Negative for rash.   Neurological:  Negative for light-headedness and headaches.   Hematological:  Does not bruise/bleed easily.   Psychiatric/Behavioral:  Negative for  agitation and behavioral problems.      Objective:     Vital Signs (Most Recent):  Temp: 98.4 °F (36.9 °C) (01/17/25 0724)  Pulse: 78 (01/17/25 0800)  Resp: 17 (01/17/25 0724)  BP: (!) 96/46 (01/17/25 0724)  SpO2: 96 % (01/17/25 0724) Vital Signs (24h Range):  Temp:  [97.3 °F (36.3 °C)-101.8 °F (38.8 °C)] 98.4 °F (36.9 °C)  Pulse:  [] 78  Resp:  [17-20] 17  SpO2:  [91 %-100 %] 96 %  BP: ()/(46-55) 96/46     Weight: 53.5 kg (117 lb 15.1 oz)  Body mass index is 20.25 kg/m².    Intake/Output Summary (Last 24 hours) at 1/17/2025 1011  Last data filed at 1/17/2025 0643  Gross per 24 hour   Intake --   Output 251 ml   Net -251 ml           Physical Exam  Constitutional:       General: She is not in acute distress.     Comments: Cachectic, ill appearing   HENT:      Head: Normocephalic and atraumatic.      Mouth/Throat:      Mouth: Mucous membranes are moist.      Pharynx: Oropharynx is clear.   Eyes:      General: No scleral icterus.  Cardiovascular:      Rate and Rhythm: Normal rate and regular rhythm.      Heart sounds: No murmur heard.     No gallop.   Pulmonary:      Breath sounds: Wheezing present.      Comments: Inspiratory and expiratory wheezing throughout, significant crackles in all lung fields  Abdominal:      General: Bowel sounds are normal. There is no distension.      Tenderness: There is no abdominal tenderness.   Musculoskeletal:      Right lower leg: No edema.      Left lower leg: No edema.   Skin:     General: Skin is warm and dry.   Neurological:      Mental Status: She is alert and oriented to person, place, and time. Mental status is at baseline.   Psychiatric:         Mood and Affect: Mood normal.         Behavior: Behavior normal.             Significant Labs: All pertinent labs within the past 24 hours have been reviewed.    Significant Imaging: I have reviewed all pertinent imaging results/findings within the past 24 hours.    Assessment and Plan     * COVID-19  Patient is identified  as Severe COVID-19 based on hypoxemia with O2 saturations <94% on room air or on ambulation   Initiate standard COVID protocols; COVID-19 testing ,Infection Control notification  and isolation- respiratory, contact and droplet per protocol    Diagnostics: CBC, CMP, Ferritin, CRP, Troponin, and Portable CXR    Management: Initiate targeted therapy with Remdesivir, 200mg IV x1, followed by 100mg IV daily x5 days total and Anticoagulation, Patient admitted to non-critical care unit- Will initiate full dose anticoagulation with Weight based lovenox 1mg/kg IV q12, stopped on 1/11/25, Maintain oxygen saturations 92-96% via Nasal Cannula  and monitor with continuous/intermittent pulse oximetry. , and Continuous cardiac monitoring.    Hospital acquired PNA  Patient more lethargic, with worsening cough and shortness of breath.  CXR with possible left-sided infiltrate.  I suspect postviral pneumonia, hospital-acquired.  Blood and sputum cultures pending.  Lactate is normal.  Has a penicillin allergy.  Started on cefepime.  Providing supplemental oxygen and we will monitor closely    Encounter for palliative care  Palliative Care is following.  Receiving hydrocodone/acetaminophen p.r.n., morphine p.r.n., alprazolam p.r.n. for respiratory distress with good effect      Epistaxis  Likely secondary to supplemental O2 and full anticoagulation    --Stop full dose lovenox on 1/11  --Recommend humidified O2 when in use  --mupirocin in bilateral nares      Acute on chronic respiratory failure with hypoxia  Patient with Hypoxic Respiratory failure which is Acute on chronic.  she is not on home oxygen. Supplemental oxygen was provided and noted-      .   Signs/symptoms of respiratory failure include- tachypnea, increased work of breathing, respiratory distress, use of accessory muscles, wheezing, stridor, and lethargy. Contributing diagnoses includes - Interstitial lung disease Labs and images were reviewed. Patient Has recent ABG,  which has been reviewed. Will treat underlying causes and adjust management of respiratory failure as follows-     Home Oxygen evaluation performed:  Home Oxygen Evaluation - [facility] - Cardiopulmonary Department      Date Performed: 1/8/2025     1)Patient's Home O2 Sat on room air, while at rest: Room Air SpO2 At Rest: 99 %                             If O2 sats on room air at rest are 88% or below, patient qualifies.  Document O2 liter flow needed in Step 2.  If O2 sats are 89% or above, complete Step 3.        2)         If patient is not ambulated and O2 sats are <88%, what is the O2 liter flow required to meet ordered saturation? Home O2 Eval Comments: pt qualifies for home oxygen     If O2 sats on room air while exercising remain 89% or above patient does not qualify, no further testing needed Document N/A in step 3. If O2 sats on room air while exercising are 88% or below, continue to Step 4.     3)Patient's O2 Sat on room air while exercising: Room Air SpO2 During Ambulation: (!) 78 %                             4)Patient's O2 Sat while exercising on O2: SpO2 During Ambulation on O2: 96 % at Ambulation O2 LPM: 4 LPM                             (Must show improvement from #4 for patients to qualify)    Patient qualifies for Home O2 @4L NC due to exertional dyspnea, hypoxia    Hypokalemia  Patient's most recent potassium results are listed below.   Recent Labs     01/08/25  0604 01/09/25  0508 01/10/25  0531   K 4.3 4.9 4.6       Plan  - Replete potassium per protocol  - Monitor potassium Daily  - Patient's hypokalemia is  being treated. Received KCL 40 mEq PO x 1 in ED  -  Stable    CKD (chronic kidney disease)  Creatine stable for now. BMP reviewed- noted Estimated Creatinine Clearance: 31.6 mL/min (based on SCr of 1.2 mg/dL). according to latest data. Based on current GFR, CKD stage is stage 4 - GFR 15-29.  Monitor UOP and serial BMP and adjust therapy as needed. Renally dose meds. Avoid  "nephrotoxic medications and procedures.  -Creatinine 1.5 on admission however 3 months ago was 1.2    Recent Labs     01/15/25  0457 01/16/25  0526 01/17/25  0518   CREATININE 1.2 1.2 1.2         -Due to GFR, will need to dc ranexa for now and decrease dose of gabapentin  -Held spironolactone now restarting 1/16/25 (although will need to hold again for hypotension this AM)  -Continue Entresto   -Monitor creatinine  -stable    Chronic systolic heart failure  Patient has Diastolic (HFpEF) heart failure that is Chronic. On presentation their CHF was well compensated. Most recent BNP and echo results are listed below.  No results for input(s): "BNP" in the last 72 hours.    Latest ECHO  Results for orders placed during the hospital encounter of 06/02/23    Echo    Interpretation Summary  · The left ventricle is normal in size with concentric hypertrophy and low normal systolic function.  · The estimated ejection fraction is 50%.  · Normal left ventricular diastolic function.  · There is abnormal septal wall motion consistent with left bundle branch block.  · Normal right ventricular size with normal right ventricular systolic function.  · Normal central venous pressure (3 mmHg).  · The estimated PA systolic pressure is 30 mmHg.  · Mild mitral regurgitation.    Current Heart Failure Medications  furosemide tablet 20 mg, Daily, Oral    Plan  - Monitor strict I&Os and daily weights.    - Place on telemetry  - Low sodium diet  - Place on fluid restriction of 1.5 L.   - Cardiology has not been consulted  - The patient's volume status is at their baseline  - Repeat ECHO, EF improved  - stop IV Lasix, resume home dose p.o. Lasix  -we are holding spironolactone, Entresto, metoprolol in the setting of sepsis and hypotension, can reintroduce them as she improves           Mixed hyperlipidemia  Lab Results   Component Value Date    CHOL 112 (L) 07/12/2024    HDL 41 07/12/2024    LDLCALC 49.8 (L) 07/12/2024    TRIG 106 07/12/2024 "     -Continue Statin    Fall  Full night of 1/15 with head strike   Evaluated by nighttime hospitalist   Head CT was negative   Neuro exam on 01/16 is normal   Bed alarm is on   Working with physical therapy, she will be discharged to rehab      Normocytic anemia  Anemia is likely due to chronic disease due to Chronic Kidney Disease. Most recent hemoglobin and hematocrit are listed below.  Recent Labs     01/15/25  0457 01/16/25  0526 01/17/25  0518   HGB 10.7* 9.8* 9.0*   HCT 34.7* 31.7* 29.6*       Plan  - Monitor serial CBC: Daily  - Transfuse PRBC if patient becomes hemodynamically unstable, symptomatic or H/H drops below 7/21.  - Patient has not received any PRBC transfusions to date  - Patient's anemia is currently stable    Seizure  Hx of seizures, patient has not had a seizure during this admission.   At home is on seizure medication Aptiom 400mg daily and Keppra 1,000mg BID  Aptiom likely worsening hyponatremia and also inhibiting SNF placement due to cost, so discussed with neuro on call  Per neuro on call, would DC Aptiom and increase Keppra to 1,500mg BID, change made  Can follow up with neuro as outpatient to revisit      Coronary artery disease of native artery of native heart with stable angina pectoris  Patient with known CAD, which is controlled Will continue Plavix and Statin and monitor for S/Sx of angina/ACS. Continue to monitor on telemetry.     COPD exacerbation  Unclear if truly exacerbated versus wheezing/shortness of breath from pneumonia or mild fluid overload.  Providing budesonide/formoterol inhalers, ipratropium as needed, and brief prednisone course    UIP (usual interstitial pneumonitis)  Pulm fibrosis hx- followed by pulmonology outpatient  -Will need close pulm f/u after discharge  -we have been discussing possible transitioned to hospice, but now she is improving and plan at this time is to get better, improve, and likely we will discharge her to SNF when medically ready        GERD  (gastroesophageal reflux disease)  -Continue PPI        VTE Risk Mitigation (From admission, onward)           Ordered     enoxaparin injection 40 mg  Every 24 hours         01/11/25 1203                    Discharge Planning   KATHERINE:      Code Status: DNR   Medical Readiness for Discharge Date:   Discharge Plan A: Skilled Nursing Facility   Discharge Delays: None known at this time              Kristopher Wynn MD  Department of Hospital Medicine   [facility] - Telemetry ([facility])

## 2025-01-17 NOTE — ASSESSMENT & PLAN NOTE
Unclear if truly exacerbated versus wheezing/shortness of breath from pneumonia or mild fluid overload.  Providing budesonide/formoterol inhalers, ipratropium as needed, and brief prednisone course

## 2025-01-17 NOTE — PROGRESS NOTES
[facility] - Telemetry ([facility])  Adult Nutrition  Progress Note    SUMMARY       Recommendations    Recommendation/Intervention:   1. Recommend pt continues on a Cardiac diet, texture per SLP recommendations   2. Recommend pt continues on Suplena TID to assist filling nutritional gaps   3. Encourage PO and supplement intake, recommend feeding assistance as warranted   4. Weigh twice weekly    Goals:   1. Diet will be modified to safest texture within 24 hrs if warranted (met)  2. Pt will tolerate and consume >75% EEN and EPN prior to RD follow up (progressing)   Nutrition Goal Status: active  Communication of RD Recs: other (comment) (POC, sticky note)    Assessment and Plan    Nutrition Problem  Underweight  Inadequate energy intake   Decreased nutrient needs (protein)    Related to (etiology):   Increased demand for nutrition   Decreased ability to consume sufficient energy   CKD    Signs and Symptoms (as evidenced by):   BMI < 22 (older adult)  SOB, Estimated intake of food less than estimated needs   Current ONS exceeds protein needs     Interventions/Recommendations (treatment strategy):  1. Decreased sodium and fat, texture modified diet   2. Management of commercial beverage medical food supplement therapy  3. Feeding assistance management   4. Collaboration by nutrition professional with other providers    Nutrition Diagnosis Status:   Continues      Malnutrition Assessment  Malnutrition Level: other (see comments) (unable to determine)  Skin (Micronutrient): bruised (Gilberto score = 18 (mild risk)                               Reason for Assessment    Reason For Assessment: RD follow-up  Diagnosis:  (COVID-19)  General Information Comments:   1/14/25: 79 y.o. Female admitted for COVID-19. PMH: GERD, UIP, CAD, Seizure, Normocytic anemia, HLD, Chronic systolic HF, CKD, Acute on chronic respiatory failure w/ hypoxia, Epistaxis; Hx: Stroke. Pt is currently on isolation and a Cardiac diet + Boost plus TID. RD  "follow up. EMR noted pt is doing better, pt still significantly SOB with exertion, pt denied hospice at this time, pt's goal is to "try to get better", pt negative for N/V/D/C. Spoke to RN via secure chat, confirmed pt is drinking Boost plus. Note Gt ordered but no wounds noted, RD d/c'd from pt's orders and trays, also modified ONS to Suplena to assist filling nutritional gaps but within protein needs for CKD. Per chart: 100% breakfast, 0% lunch PO intake today (1/14), LBM 1/12, GI WDL. Labs, meds, weights reviewed. Weight charted 1/7/25 112 lbs, 1/14/25 111 lbs (BMI 19.22, Underweight for age), wt stable. Unable to perform NFPE d/t pt's isolation status, will perform when appropriate. RD will continue to follow and monitor pt's nutritional status during admit.Nutrition Discharge Planning: Cardiac diet, texture per SLP recommendations + Boost plus as warranted    Follow-Up:   1/17/25: Dietitian working remotely. Per EMR flowsheets, pt ate 0% of her breakfast this morning however documented intakes have ranged from 0-100% this admit. Pt with 68% avg PO intake of meals x3 days.     Nutrition/Diet History    Spiritual, Cultural Beliefs, Baptist Practices, Values that Affect Care: no  Food Allergies: NKFA  Factors Affecting Nutritional Intake: other (see comments) (SOB)    Nutrition Related Social Determinants of Health: SDOH: Unable to assess at this time.     Anthropometrics    Height: 5' 4" (162.6 cm)  Height (inches): 64 in  Height Method: Stated  Weight: 53.5 kg (117 lb 15.1 oz)  Weight (lb): 117.95 lb  Weight Method: Bed Scale  Ideal Body Weight (IBW), Female: 120 lb  % Ideal Body Weight, Female (lb): 93.33 %  BMI (Calculated): 20.2  BMI Grade: 18.5-24.9 - normal (Underweight for age)       Wt Readings from Last 15 Encounters:   01/16/25 53.5 kg (117 lb 15.1 oz)   12/31/24 49.2 kg (108 lb 7.5 oz)   12/05/24 49.2 kg (108 lb 7.5 oz)   11/21/24 48.3 kg (106 lb 7.7 oz)   09/04/24 46.4 kg (102 lb 4.7 oz) " "  09/04/24 47.7 kg (105 lb 4.3 oz)   08/01/24 45.8 kg (101 lb)   07/17/24 46.5 kg (102 lb 8.2 oz)   05/27/24 48.1 kg (106 lb)   05/23/24 48.1 kg (106 lb 0.7 oz)   05/22/24 47.6 kg (104 lb 15 oz)   05/17/24 47.4 kg (104 lb 8 oz)   05/08/24 47.4 kg (104 lb 8 oz)   05/08/24 47.4 kg (104 lb 8 oz)   05/08/24 47.4 kg (104 lb 8 oz)     Lab/Procedures/Meds    Pertinent Labs Reviewed: reviewed  Pertinent Medications Reviewed: reviewed  Pertinent Medications Comments: 500 mg/day vitamin C, MTV, polyethylene glycol    BMP  Lab Results   Component Value Date     (L) 01/17/2025    K 4.1 01/17/2025     01/17/2025    CO2 21 (L) 01/17/2025    BUN 65 (H) 01/17/2025    CREATININE 1.2 01/17/2025    CALCIUM 8.6 (L) 01/17/2025    ANIONGAP 7 (L) 01/17/2025    EGFRNORACEVR 46 (A) 01/17/2025     Lab Results   Component Value Date    CALCIUM 8.6 (L) 01/17/2025    PHOS 2.8 01/17/2025     Lab Results   Component Value Date    ALBUMIN 2.5 (L) 01/13/2025     Lab Results   Component Value Date    ALT 24 01/13/2025    AST 17 01/13/2025    ALKPHOS 49 01/13/2025    BILITOT 0.3 01/13/2025     No results for input(s): "POCTGLUCOSE" in the last 24 hours.    Lab Results   Component Value Date    HGBA1C 4.9 07/12/2024     Lab Results   Component Value Date    WBC 6.13 01/17/2025    HGB 9.0 (L) 01/17/2025    HCT 29.6 (L) 01/17/2025     (H) 01/17/2025     (L) 01/17/2025       Scheduled Meds:   ascorbic acid (vitamin C)  500 mg Oral BID    atorvastatin  40 mg Oral Daily    benzonatate  100 mg Oral TID    ceFEPime IV (PEDS and ADULTS)  2 g Intravenous Q12H    clopidogreL  75 mg Oral Daily    enoxparin  40 mg Subcutaneous Q24H (prophylaxis, 1700)    EScitalopram oxalate  20 mg Oral Daily    fluticasone propionate  2 spray Each Nostril Daily    furosemide  20 mg Oral Daily    gabapentin  200 mg Oral TID    guaiFENesin  1,200 mg Oral BID    levETIRAcetam  1,500 mg Oral BID    magnesium sulfate IVPB  2 g Intravenous Q2H    metoprolol " tartrate  50 mg Oral BID    montelukast  10 mg Oral Daily    multivitamin  1 tablet Oral Daily    pantoprazole  40 mg Oral BID    polyethylene glycol  17 g Oral Daily    potassium, sodium phosphates  2 packet Oral QID (AC & HS)    predniSONE  40 mg Oral Daily    sacubitriL-valsartan  1 tablet Oral BID     Continuous Infusions:  PRN Meds:.  Current Facility-Administered Medications:     albuterol-ipratropium, 3 mL, Nebulization, Q4H PRN    ALPRAZolam, 0.25 mg, Oral, TID PRN    dextrose 50%, 12.5 g, Intravenous, PRN    dextrose 50%, 25 g, Intravenous, PRN    glucagon (human recombinant), 1 mg, Intramuscular, PRN    glucose, 16 g, Oral, PRN    glucose, 24 g, Oral, PRN    magic mouthwash (diphenhydrAMINE 12.5 mg/5 mL 20 mL, aluminum & magnesium hydroxide-simethicone (MYLANTA) 20 mL, LIDOcaine HCl 2% (XYLOCAINE) 20 mL) solution, 10 mL, Swish & Spit, Q4H PRN    melatonin, 6 mg, Oral, Nightly PRN    methocarbamoL, 500 mg, Oral, TID PRN    morphine, 2 mg, Intravenous, Q4H PRN    ondansetron, 4 mg, Intravenous, Q8H PRN    sodium chloride 0.9%, 10 mL, Intravenous, PRN      Estimated/Assessed Needs    Weight Used For Calorie Calculations: 50.8 kg (111 lb 15.9 oz)  Energy Calorie Requirements (kcal): 2730-0194 kcals (30-35 kcals/kg ABW (Underweight for age))  Energy Need Method: Kcal/kg  Protein Requirements: 27-31 g (0.55-0.6 g/kg ABW (CKD, non dialysis)  Weight Used For Protein Calculations: 50.8 kg (111 lb 15.9 oz)  Fluid Requirements (mL): 3787-9480 mL (1 mL/kcal, per MD/NP)  Estimated Fluid Requirement Method: RDA Method  RDA Method (mL): 1524  CHO Requirement: 190-222 g (9297-0863 kcals/8)      Nutrition Prescription Ordered    Current Diet Order: Cardiac diet  Oral Nutrition Supplement: Suplena TID    Evaluation of Received Nutrient/Fluid Intake  I/O: (Net since admit):  1/14/25: -9250 mL  1/17/25: -3256 mL    Energy Calories Required: not meeting needs  Protein Required: meeting needs  Fluid Required: not meeting  needs  Total Fluid Intake (mL): 510  Tolerance: tolerating  % Intake of Estimated Energy Needs: 50 - 75 %  % Meal Intake: 0-100%    Nutrition Risk    Level of Risk/Frequency of Follow-up: high (F/u x 2 weekly)     Monitor and Evaluation    Food and Nutrient Intake: food and beverage intake, energy intake  Food and Nutrient Adminstration: diet order  Knowledge/Beliefs/Attitudes: food and nutrition knowledge/skill, beliefs and attitudes  Anthropometric Measurements: weight, weight change, body mass index  Biochemical Data, Medical Tests and Procedures: electrolyte and renal panel     Nutrition Follow-Up    RD Follow-up?: Yes  Mary Aguilar, MS, RD, LDN

## 2025-01-17 NOTE — SUBJECTIVE & OBJECTIVE
Review of Systems   Constitutional:  Negative for fatigue and fever.        Overall feels worse today, more tired   HENT: Negative.     Respiratory:  Positive for chest tightness, shortness of breath and wheezing.         Cough again worsening today   Cardiovascular:  Negative for chest pain and leg swelling.   Gastrointestinal:  Negative for abdominal pain, constipation, diarrhea and nausea.   Genitourinary:  Negative for difficulty urinating and dysuria.   Musculoskeletal: Negative.    Skin:  Negative for rash.   Neurological:  Negative for light-headedness and headaches.   Hematological:  Does not bruise/bleed easily.   Psychiatric/Behavioral:  Negative for agitation and behavioral problems.      Objective:     Vital Signs (Most Recent):  Temp: 98.4 °F (36.9 °C) (01/17/25 0724)  Pulse: 78 (01/17/25 0800)  Resp: 17 (01/17/25 0724)  BP: (!) 96/46 (01/17/25 0724)  SpO2: 96 % (01/17/25 0724) Vital Signs (24h Range):  Temp:  [97.3 °F (36.3 °C)-101.8 °F (38.8 °C)] 98.4 °F (36.9 °C)  Pulse:  [] 78  Resp:  [17-20] 17  SpO2:  [91 %-100 %] 96 %  BP: ()/(46-55) 96/46     Weight: 53.5 kg (117 lb 15.1 oz)  Body mass index is 20.25 kg/m².    Intake/Output Summary (Last 24 hours) at 1/17/2025 1011  Last data filed at 1/17/2025 0643  Gross per 24 hour   Intake --   Output 251 ml   Net -251 ml           Physical Exam  Constitutional:       General: She is not in acute distress.     Comments: Cachectic, ill appearing   HENT:      Head: Normocephalic and atraumatic.      Mouth/Throat:      Mouth: Mucous membranes are moist.      Pharynx: Oropharynx is clear.   Eyes:      General: No scleral icterus.  Cardiovascular:      Rate and Rhythm: Normal rate and regular rhythm.      Heart sounds: No murmur heard.     No gallop.   Pulmonary:      Breath sounds: Wheezing present.      Comments: Inspiratory and expiratory wheezing throughout, significant crackles in all lung fields  Abdominal:      General: Bowel sounds are  normal. There is no distension.      Tenderness: There is no abdominal tenderness.   Musculoskeletal:      Right lower leg: No edema.      Left lower leg: No edema.   Skin:     General: Skin is warm and dry.   Neurological:      Mental Status: She is alert and oriented to person, place, and time. Mental status is at baseline.   Psychiatric:         Mood and Affect: Mood normal.         Behavior: Behavior normal.             Significant Labs: All pertinent labs within the past 24 hours have been reviewed.    Significant Imaging: I have reviewed all pertinent imaging results/findings within the past 24 hours.

## 2025-01-17 NOTE — PT/OT/SLP PROGRESS
Occupational Therapy   Treatment    Name: Shireen Felix  MRN: [id number]  Admitting Diagnosis:  COVID-19       Recommendations:     Discharge Recommendations: Moderate Intensity Therapy  Discharge Equipment Recommendations:  to be determined by next level of care  Barriers to discharge:  Decreased caregiver support    Assessment:     Shireen Felix is a 79 y.o. female with a medical diagnosis of COVID-19.  She presents with the following performance deficits affecting function are weakness, impaired endurance, impaired self care skills, impaired sensation, impaired functional mobility, gait instability, impaired balance, impaired cognition, decreased coordination, decreased upper extremity function, decreased lower extremity function, decreased safety awareness, impaired cardiopulmonary response to activity, decreased ROM.     Rehab Prognosis:  Fair; patient would benefit from acute skilled OT services to address these deficits and reach maximum level of function.       Plan:     Patient to be seen 2 x/week to address the above listed problems via self-care/home management, therapeutic activities, therapeutic exercises  Plan of Care Expires: 01/17/25  Plan of Care Reviewed with: patient, daughter    Subjective     Chief Complaint: Low energy  Patient/Family Comments/goals: Increase [place]   Pain/Comfort:  Pain Rating 1: 5/10  Location - Side 1: Bilateral  Location - Orientation 1: generalized  Location 1: foot  Pain Addressed 1: Reposition  Pain Rating Post-Intervention 1: 5/10    Objective:     Communicated with: Genia and MARC prior to session.  Patient found up in chair with peripheral IV, telemetry, bed alarm (AVASYS) upon OT entry to room.    General Precautions: Standard, airborne, contact, droplet, fall    Orthopedic Precautions:N/A  Braces: N/A  Respiratory Status: Room air     Occupational Performance:     Bed Mobility:    Patient completed Rolling/Turning to Right with minimum  assistance  Patient completed Scooting/Bridging with minimum assistance  Patient completed Supine to Sit with Min A    Pt required more increase time due to low energy  Functional Mobility/Transfers:  Patient completed Sit <> Stand Transfer with mod of 2  with  hand-held assist       Activities of Daily Living:  Grooming: stand by assistance for completing tooth brush and wash face      AMPAC 6 Click ADL: 17    Treatment & Education:  Encouraged completion of B UE AROM therex throughout the day to increase functional strength and activity tolerance needed for ADL completion.  OT role, plan of care, progression of goals, importance of continued OOB activity, ADL/functional transfer and mobility retraining, call don't fall, safety precautions, fall prevention.        Patient left up in chair with all lines intact, call button in reach, chair alarm on, and daughter present    GOALS:   Multidisciplinary Problems       Occupational Therapy Goals          Problem: Occupational Therapy    Goal Priority Disciplines Outcome Interventions   Occupational Therapy Goal     OT, PT/OT Progressing    Description: O.T GOALS MET BY 1-17-25  PT WILL TOLERATE  1 SET X 12 REPS B UE ROM EXERCISE  S WITH UE DRESSING  S WITH LE DRESSING  S WITH TOILET TRANSFERS                             Time Tracking:     OT Date of Treatment: 01/17/25  OT Start Time: 0905  OT Stop Time: 0935  OT Total Time (min): 30 min    Billable Minutes:Self Care/Home Management 10  Therapeutic Activity 20    OT/CORINA: CORINA     Number of CORINA visits since last OT visit: 3  A client care conference was performed between the HUMZA and HOANG, prior to treatment by HOANG, to discuss the patient's status, treatment plan and established goals.    HOANG Tovar    1/17/2025

## 2025-01-18 DIAGNOSIS — M79.18 MYOFASCIAL MUSCLE PAIN: ICD-10-CM

## 2025-01-18 DIAGNOSIS — G89.4 CHRONIC PAIN DISORDER: ICD-10-CM

## 2025-01-18 PROBLEM — R04.2 HEMOPTYSIS: Status: ACTIVE | Noted: 2025-01-18

## 2025-01-18 LAB
ABO + RH BLD: NORMAL
ALBUMIN SERPL BCP-MCNC: 2.1 G/DL (ref 3.5–5.2)
ALP SERPL-CCNC: 44 U/L (ref 40–150)
ALT SERPL W/O P-5'-P-CCNC: 23 U/L (ref 10–44)
ANION GAP SERPL CALC-SCNC: 7 MMOL/L (ref 8–16)
ANION GAP SERPL CALC-SCNC: 7 MMOL/L (ref 8–16)
APTT PPP: 34.5 SEC (ref 21–32)
AST SERPL-CCNC: 23 U/L (ref 10–40)
BASOPHILS # BLD AUTO: 0 K/UL (ref 0–0.2)
BASOPHILS # BLD AUTO: 0.01 K/UL (ref 0–0.2)
BASOPHILS NFR BLD: 0 % (ref 0–1.9)
BASOPHILS NFR BLD: 0.2 % (ref 0–1.9)
BILIRUB SERPL-MCNC: 0.2 MG/DL (ref 0.1–1)
BLD GP AB SCN CELLS X3 SERPL QL: NORMAL
BUN SERPL-MCNC: 54 MG/DL (ref 8–23)
BUN SERPL-MCNC: 54 MG/DL (ref 8–23)
CALCIUM SERPL-MCNC: 8.4 MG/DL (ref 8.7–10.5)
CALCIUM SERPL-MCNC: 8.5 MG/DL (ref 8.7–10.5)
CHLORIDE SERPL-SCNC: 104 MMOL/L (ref 95–110)
CHLORIDE SERPL-SCNC: 104 MMOL/L (ref 95–110)
CO2 SERPL-SCNC: 22 MMOL/L (ref 23–29)
CO2 SERPL-SCNC: 22 MMOL/L (ref 23–29)
CREAT SERPL-MCNC: 0.9 MG/DL (ref 0.5–1.4)
CREAT SERPL-MCNC: 0.9 MG/DL (ref 0.5–1.4)
DIFFERENTIAL METHOD BLD: ABNORMAL
DIFFERENTIAL METHOD BLD: ABNORMAL
EOSINOPHIL # BLD AUTO: 0 K/UL (ref 0–0.5)
EOSINOPHIL # BLD AUTO: 0 K/UL (ref 0–0.5)
EOSINOPHIL NFR BLD: 0 % (ref 0–8)
EOSINOPHIL NFR BLD: 0 % (ref 0–8)
ERYTHROCYTE [DISTWIDTH] IN BLOOD BY AUTOMATED COUNT: 14 % (ref 11.5–14.5)
ERYTHROCYTE [DISTWIDTH] IN BLOOD BY AUTOMATED COUNT: 14.2 % (ref 11.5–14.5)
EST. GFR  (NO RACE VARIABLE): >60 ML/MIN/1.73 M^2
EST. GFR  (NO RACE VARIABLE): >60 ML/MIN/1.73 M^2
FIBRINOGEN PPP-MCNC: 574 MG/DL (ref 182–400)
GLUCOSE SERPL-MCNC: 120 MG/DL (ref 70–110)
GLUCOSE SERPL-MCNC: 121 MG/DL (ref 70–110)
HCT VFR BLD AUTO: 27.2 % (ref 37–48.5)
HCT VFR BLD AUTO: 27.4 % (ref 37–48.5)
HGB BLD-MCNC: 8.5 G/DL (ref 12–16)
HGB BLD-MCNC: 8.6 G/DL (ref 12–16)
IMM GRANULOCYTES # BLD AUTO: 0.03 K/UL (ref 0–0.04)
IMM GRANULOCYTES # BLD AUTO: 0.03 K/UL (ref 0–0.04)
IMM GRANULOCYTES NFR BLD AUTO: 0.7 % (ref 0–0.5)
IMM GRANULOCYTES NFR BLD AUTO: 0.8 % (ref 0–0.5)
INR PPP: 1 (ref 0.8–1.2)
LYMPHOCYTES # BLD AUTO: 0.4 K/UL (ref 1–4.8)
LYMPHOCYTES # BLD AUTO: 0.4 K/UL (ref 1–4.8)
LYMPHOCYTES NFR BLD: 10.3 % (ref 18–48)
LYMPHOCYTES NFR BLD: 9.8 % (ref 18–48)
MAGNESIUM SERPL-MCNC: 2.5 MG/DL (ref 1.6–2.6)
MAGNESIUM SERPL-MCNC: 2.5 MG/DL (ref 1.6–2.6)
MCH RBC QN AUTO: 31 PG (ref 27–31)
MCH RBC QN AUTO: 31.6 PG (ref 27–31)
MCHC RBC AUTO-ENTMCNC: 31.3 G/DL (ref 32–36)
MCHC RBC AUTO-ENTMCNC: 31.4 G/DL (ref 32–36)
MCV RBC AUTO: 101 FL (ref 82–98)
MCV RBC AUTO: 99 FL (ref 82–98)
MONOCYTES # BLD AUTO: 0.2 K/UL (ref 0.3–1)
MONOCYTES # BLD AUTO: 0.2 K/UL (ref 0.3–1)
MONOCYTES NFR BLD: 4.2 % (ref 4–15)
MONOCYTES NFR BLD: 5 % (ref 4–15)
NEUTROPHILS # BLD AUTO: 3.3 K/UL (ref 1.8–7.7)
NEUTROPHILS # BLD AUTO: 3.5 K/UL (ref 1.8–7.7)
NEUTROPHILS NFR BLD: 83.9 % (ref 38–73)
NEUTROPHILS NFR BLD: 85.1 % (ref 38–73)
NRBC BLD-RTO: 0 /100 WBC
NRBC BLD-RTO: 0 /100 WBC
PHOSPHATE SERPL-MCNC: 3.3 MG/DL (ref 2.7–4.5)
PLATELET # BLD AUTO: 105 K/UL (ref 150–450)
PLATELET # BLD AUTO: 114 K/UL (ref 150–450)
PMV BLD AUTO: 10.3 FL (ref 9.2–12.9)
PMV BLD AUTO: 10.8 FL (ref 9.2–12.9)
POTASSIUM SERPL-SCNC: 4.8 MMOL/L (ref 3.5–5.1)
POTASSIUM SERPL-SCNC: 4.9 MMOL/L (ref 3.5–5.1)
PROT SERPL-MCNC: 5.7 G/DL (ref 6–8.4)
PROTHROMBIN TIME: 10.9 SEC (ref 9–12.5)
RBC # BLD AUTO: 2.72 M/UL (ref 4–5.4)
RBC # BLD AUTO: 2.74 M/UL (ref 4–5.4)
SODIUM SERPL-SCNC: 133 MMOL/L (ref 136–145)
SODIUM SERPL-SCNC: 133 MMOL/L (ref 136–145)
SPECIMEN OUTDATE: NORMAL
WBC # BLD AUTO: 3.98 K/UL (ref 3.9–12.7)
WBC # BLD AUTO: 4.08 K/UL (ref 3.9–12.7)

## 2025-01-18 PROCEDURE — 63600175 PHARM REV CODE 636 W HCPCS: Performed by: STUDENT IN AN ORGANIZED HEALTH CARE EDUCATION/TRAINING PROGRAM

## 2025-01-18 PROCEDURE — 85025 COMPLETE CBC W/AUTO DIFF WBC: CPT | Mod: 91 | Performed by: NURSE PRACTITIONER

## 2025-01-18 PROCEDURE — 85025 COMPLETE CBC W/AUTO DIFF WBC: CPT | Performed by: STUDENT IN AN ORGANIZED HEALTH CARE EDUCATION/TRAINING PROGRAM

## 2025-01-18 PROCEDURE — 25000003 PHARM REV CODE 250: Performed by: STUDENT IN AN ORGANIZED HEALTH CARE EDUCATION/TRAINING PROGRAM

## 2025-01-18 PROCEDURE — 99900035 HC TECH TIME PER 15 MIN (STAT)

## 2025-01-18 PROCEDURE — 85384 FIBRINOGEN ACTIVITY: CPT | Performed by: NURSE PRACTITIONER

## 2025-01-18 PROCEDURE — 25500020 PHARM REV CODE 255: Performed by: STUDENT IN AN ORGANIZED HEALTH CARE EDUCATION/TRAINING PROGRAM

## 2025-01-18 PROCEDURE — 94640 AIRWAY INHALATION TREATMENT: CPT

## 2025-01-18 PROCEDURE — 21400001 HC TELEMETRY ROOM

## 2025-01-18 PROCEDURE — 27100171 HC OXYGEN HIGH FLOW UP TO 24 HOURS

## 2025-01-18 PROCEDURE — 83735 ASSAY OF MAGNESIUM: CPT | Mod: 91 | Performed by: NURSE PRACTITIONER

## 2025-01-18 PROCEDURE — 86901 BLOOD TYPING SEROLOGIC RH(D): CPT | Performed by: NURSE PRACTITIONER

## 2025-01-18 PROCEDURE — 83735 ASSAY OF MAGNESIUM: CPT | Performed by: STUDENT IN AN ORGANIZED HEALTH CARE EDUCATION/TRAINING PROGRAM

## 2025-01-18 PROCEDURE — 27000221 HC OXYGEN, UP TO 24 HOURS

## 2025-01-18 PROCEDURE — 36415 COLL VENOUS BLD VENIPUNCTURE: CPT | Performed by: STUDENT IN AN ORGANIZED HEALTH CARE EDUCATION/TRAINING PROGRAM

## 2025-01-18 PROCEDURE — 84100 ASSAY OF PHOSPHORUS: CPT | Performed by: STUDENT IN AN ORGANIZED HEALTH CARE EDUCATION/TRAINING PROGRAM

## 2025-01-18 PROCEDURE — 94761 N-INVAS EAR/PLS OXIMETRY MLT: CPT

## 2025-01-18 PROCEDURE — 36415 COLL VENOUS BLD VENIPUNCTURE: CPT | Performed by: NURSE PRACTITIONER

## 2025-01-18 PROCEDURE — 80048 BASIC METABOLIC PNL TOTAL CA: CPT | Performed by: STUDENT IN AN ORGANIZED HEALTH CARE EDUCATION/TRAINING PROGRAM

## 2025-01-18 PROCEDURE — 85730 THROMBOPLASTIN TIME PARTIAL: CPT | Performed by: NURSE PRACTITIONER

## 2025-01-18 PROCEDURE — 25000003 PHARM REV CODE 250: Performed by: NURSE PRACTITIONER

## 2025-01-18 PROCEDURE — 25000242 PHARM REV CODE 250 ALT 637 W/ HCPCS: Performed by: STUDENT IN AN ORGANIZED HEALTH CARE EDUCATION/TRAINING PROGRAM

## 2025-01-18 PROCEDURE — 25000003 PHARM REV CODE 250: Performed by: INTERNAL MEDICINE

## 2025-01-18 PROCEDURE — 85610 PROTHROMBIN TIME: CPT | Performed by: NURSE PRACTITIONER

## 2025-01-18 PROCEDURE — 63600175 PHARM REV CODE 636 W HCPCS: Performed by: NURSE PRACTITIONER

## 2025-01-18 PROCEDURE — 80053 COMPREHEN METABOLIC PANEL: CPT | Performed by: NURSE PRACTITIONER

## 2025-01-18 RX ORDER — PROMETHAZINE HYDROCHLORIDE AND CODEINE PHOSPHATE 6.25; 1 MG/5ML; MG/5ML
5 SOLUTION ORAL EVERY 4 HOURS PRN
Status: DISCONTINUED | OUTPATIENT
Start: 2025-01-18 | End: 2025-01-28 | Stop reason: HOSPADM

## 2025-01-18 RX ORDER — TRANEXAMIC ACID 100 MG/ML
500 INJECTION, SOLUTION INTRAVENOUS ONCE
Status: DISCONTINUED | OUTPATIENT
Start: 2025-01-18 | End: 2025-01-18

## 2025-01-18 RX ORDER — PROMETHAZINE HYDROCHLORIDE AND CODEINE PHOSPHATE 6.25; 1 MG/5ML; MG/5ML
10 SOLUTION ORAL ONCE
Status: COMPLETED | OUTPATIENT
Start: 2025-01-18 | End: 2025-01-18

## 2025-01-18 RX ORDER — OXYMETAZOLINE HCL 0.05 %
2 SPRAY, NON-AEROSOL (ML) NASAL
Status: DISCONTINUED | OUTPATIENT
Start: 2025-01-18 | End: 2025-01-18

## 2025-01-18 RX ADMIN — FLUTICASONE PROPIONATE 100 MCG: 50 SPRAY, METERED NASAL at 09:01

## 2025-01-18 RX ADMIN — MORPHINE SULFATE 2 MG: 2 INJECTION, SOLUTION INTRAMUSCULAR; INTRAVENOUS at 03:01

## 2025-01-18 RX ADMIN — OXYMETAZOLINE HYDROCHLORIDE 2 SPRAY: 0.5 SPRAY NASAL at 12:01

## 2025-01-18 RX ADMIN — BUDESONIDE INHALATION 0.5 MG: 0.5 SUSPENSION RESPIRATORY (INHALATION) at 09:01

## 2025-01-18 RX ADMIN — OXYCODONE HYDROCHLORIDE AND ACETAMINOPHEN 500 MG: 500 TABLET ORAL at 09:01

## 2025-01-18 RX ADMIN — CEFEPIME 2 G: 2 INJECTION, POWDER, FOR SOLUTION INTRAVENOUS at 11:01

## 2025-01-18 RX ADMIN — TRANEXAMIC ACID 500 MG: 100 INJECTION, SOLUTION INTRAVENOUS at 04:01

## 2025-01-18 RX ADMIN — GABAPENTIN 200 MG: 100 CAPSULE ORAL at 09:01

## 2025-01-18 RX ADMIN — THERA TABS 1 TABLET: TAB at 09:01

## 2025-01-18 RX ADMIN — PROMETHAZINE HYDROCHLORIDE AND CODEINE PHOSPHATE 5 ML: 6.25; 1 SOLUTION ORAL at 11:01

## 2025-01-18 RX ADMIN — ALPRAZOLAM 0.25 MG: 0.25 TABLET ORAL at 06:01

## 2025-01-18 RX ADMIN — PREDNISONE 40 MG: 20 TABLET ORAL at 09:01

## 2025-01-18 RX ADMIN — LEVETIRACETAM 1500 MG: 500 TABLET, FILM COATED ORAL at 09:01

## 2025-01-18 RX ADMIN — FUROSEMIDE 20 MG: 20 TABLET ORAL at 09:01

## 2025-01-18 RX ADMIN — PROMETHAZINE HYDROCHLORIDE AND CODEINE PHOSPHATE 10 ML: 6.25; 1 SOLUTION ORAL at 10:01

## 2025-01-18 RX ADMIN — MONTELUKAST 10 MG: 10 TABLET, FILM COATED ORAL at 09:01

## 2025-01-18 RX ADMIN — PANTOPRAZOLE SODIUM 40 MG: 40 TABLET, DELAYED RELEASE ORAL at 09:01

## 2025-01-18 RX ADMIN — ATORVASTATIN CALCIUM 40 MG: 40 TABLET, FILM COATED ORAL at 09:01

## 2025-01-18 RX ADMIN — ALPRAZOLAM 0.25 MG: 0.25 TABLET ORAL at 09:01

## 2025-01-18 RX ADMIN — MORPHINE SULFATE 2 MG: 2 INJECTION, SOLUTION INTRAMUSCULAR; INTRAVENOUS at 07:01

## 2025-01-18 RX ADMIN — PROMETHAZINE HYDROCHLORIDE AND CODEINE PHOSPHATE 5 ML: 6.25; 1 SOLUTION ORAL at 07:01

## 2025-01-18 RX ADMIN — TRANEXAMIC ACID 500 MG: 100 INJECTION, SOLUTION INTRAVENOUS at 09:01

## 2025-01-18 RX ADMIN — POLYETHYLENE GLYCOL 3350 17 G: 17 POWDER, FOR SOLUTION ORAL at 09:01

## 2025-01-18 RX ADMIN — CEFEPIME 2 G: 2 INJECTION, POWDER, FOR SOLUTION INTRAVENOUS at 12:01

## 2025-01-18 RX ADMIN — GUAIFENESIN 1200 MG: 600 TABLET, EXTENDED RELEASE ORAL at 09:01

## 2025-01-18 RX ADMIN — IOHEXOL 100 ML: 350 INJECTION, SOLUTION INTRAVENOUS at 04:01

## 2025-01-18 RX ADMIN — OXYMETAZOLINE HYDROCHLORIDE 2 SPRAY: 0.5 SPRAY NASAL at 10:01

## 2025-01-18 RX ADMIN — GABAPENTIN 200 MG: 100 CAPSULE ORAL at 03:01

## 2025-01-18 RX ADMIN — BUDESONIDE INHALATION 0.5 MG: 0.5 SUSPENSION RESPIRATORY (INHALATION) at 08:01

## 2025-01-18 RX ADMIN — ARFORMOTEROL TARTRATE 15 MCG: 15 SOLUTION RESPIRATORY (INHALATION) at 08:01

## 2025-01-18 RX ADMIN — BENZONATATE 100 MG: 100 CAPSULE ORAL at 09:01

## 2025-01-18 RX ADMIN — BENZONATATE 100 MG: 100 CAPSULE ORAL at 01:01

## 2025-01-18 RX ADMIN — ALPRAZOLAM 0.25 MG: 0.25 TABLET ORAL at 11:01

## 2025-01-18 RX ADMIN — PROMETHAZINE HYDROCHLORIDE AND CODEINE PHOSPHATE 5 ML: 6.25; 1 SOLUTION ORAL at 03:01

## 2025-01-18 RX ADMIN — ESCITALOPRAM OXALATE 20 MG: 10 TABLET, FILM COATED ORAL at 09:01

## 2025-01-18 RX ADMIN — MORPHINE SULFATE 2 MG: 2 INJECTION, SOLUTION INTRAMUSCULAR; INTRAVENOUS at 09:01

## 2025-01-18 RX ADMIN — ARFORMOTEROL TARTRATE 15 MCG: 15 SOLUTION RESPIRATORY (INHALATION) at 09:01

## 2025-01-18 NOTE — CONSULTS
[facility] ([facility])  Pulmonology  Consult Note    Patient Name: Shireen Felix  MRN: [id number]  Admission Date: 12/31/2024  Hospital Length of Stay: 17 days  Code Status: DNR  Attending Physician: Kristopher Wynn MD  Primary Care Provider: Laron Chaudhari MD   Principal Problem: COVID-19    Inpatient consult to Pulmonology  Consult performed by: Brody Manley MD  Consult ordered by: Janna Robbins NP        Subjective:     HPI:  Shireen Felix is an 79 yr old female with a history of multiple comorbidities, see below, CHF with EF 30-40% as well as previously diagnosed pulmonary fibrosis, followed by pulmonology oupatient. She was admitted on 12/31 with COVID-19 infection. She has received nebulizers, steroids, antibiotics, with minimal improvement in dyspnea and cough. Pulmonology has been consulted for respiratory distress.     1/18/25 - Reconsulted today due to c/f hemoptysis overnight.  CTA chest is fairly unremarkable.  Seems more likely related to epistaxis.  Placed on Zabrina-mask to keep the cannula out of her nose.  Afrin.  Will trial some TXA nebs for good measure, but I don't feel this is likely from a pulmonary source.  Codeine cough syrup to help with her persistent cough.    Past Medical History:   Diagnosis Date    Abnormal ECG 08/05/2019    Acute bronchitis and bronchiolitis 12/31/2024    Aneurysm     Anticoagulant long-term use     Arthritis     CAD in native artery 08/05/2019    Diabetes mellitus     Fall 10/10/2019    Formatting of this note might be different from the original. Found sitting on floor next to bed last night Mild confusion today Does not recall falling or how she ended up on floor UA today Labs yesterday unremarkable    Glaucoma     Hypertension     Pulmonary fibrosis     Seizures     Shingles 05/27/2017    Stroke        Past Surgical History:   Procedure Laterality Date    BRAIN SURGERY      CARDIAC CATHETERIZATION      CATHETERIZATION OF BOTH LEFT  AND RIGHT HEART N/A 5/17/2024    Procedure: CATHETERIZATION, HEART, BOTH LEFT AND RIGHT;  Surgeon: Rachelle Sarabia MD;  Location: [facility] CATH LAB;  Service: Cardiology;  Laterality: N/A;    COLONOSCOPY N/A 09/21/2023    Procedure: COLONOSCOPY;  Surgeon: Joanna Leal MD;  Location: [facility] ENDO;  Service: Endoscopy;  Laterality: N/A;    CORONARY ANGIOPLASTY      EPIDURAL STEROID INJECTION INTO CERVICAL SPINE N/A 2/7/2024    Procedure: Cervical IL XANDER, Level C6/7, RN IV sedation;  Surgeon: Francisco Oshea MD;  Location: [facility] PAIN MGT;  Service: Pain Management;  Laterality: N/A;    EPIDURAL STEROID INJECTION INTO LUMBAR SPINE Bilateral 11/12/2019    Procedure: TF XANDER L4/5;  Surgeon: Desean Dias MD;  Location: [facility] PAIN MGT;  Service: Pain Management;  Laterality: Bilateral;    HYSTERECTOMY      INJECTION OF ANESTHETIC AGENT AROUND MEDIAL BRANCH NERVES INNERVATING LUMBAR FACET JOINT Bilateral 01/31/2020    Procedure: Bilateral L3-5 MBB;  Surgeon: Desean Dias MD;  Location: [facility] PAIN MGT;  Service: Pain Management;  Laterality: Bilateral;    INJECTION OF ANESTHETIC AGENT INTO SACROILIAC JOINT Right 11/16/2021    Procedure: Right BLOCK, SACROILIAC JOINT Right GTB with RN IV sedation;  Surgeon: Yao Fulton MD;  Location: [facility] PAIN MGT;  Service: Pain Management;  Laterality: Right;    INJECTION OF ANESTHETIC AGENT INTO SACROILIAC JOINT Bilateral 07/28/2023    Procedure: Bilateral GT bursa + bilateral SIJ injection;  Surgeon: Francisco Oshea MD;  Location: [facility] PAIN MGT;  Service: Pain Management;  Laterality: Bilateral;    INJECTION OF JOINT Bilateral 07/09/2020    Procedure: Bilateral shoulder GH Joint injection with local;  Surgeon: Desean Dias MD;  Location: [facility] PAIN MGT;  Service: Pain Management;  Laterality: Bilateral;    INJECTION OF JOINT Bilateral 07/28/2023    Procedure: Bilateral GT bursa + bilateral SIJ injection NEEDS ADDITIONAL SEDATION AND MORE TIME BEFORE INJECTION RN IV Sedation;   Surgeon: Francisco Oshea MD;  Location: [facility] PAIN MGT;  Service: Pain Management;  Laterality: Bilateral;    INJECTION OF JOINT N/A 10/25/2023    Procedure: Sacrococcygeal joint injection;  Surgeon: Francisco Oshea MD;  Location: [facility] PAIN MGT;  Service: Pain Management;  Laterality: N/A;    OOPHORECTOMY      SELECTIVE INJECTION OF ANESTHETIC AGENT AROUND LUMBAR SPINAL NERVE ROOT BY TRANSFORAMINAL APPROACH Bilateral 04/26/2023    Procedure: Bilateral L4/5 TF XANDER RN IV Sedation;  Surgeon: Francisco Oshea MD;  Location: [facility] PAIN MGT;  Service: Pain Management;  Laterality: Bilateral;    TRANSFORAMINAL EPIDURAL INJECTION OF STEROID Bilateral 07/23/2019    Procedure: Bilateral L3/4 Transforaminal Epidural Steroid Injection;  Surgeon: Desean Dias MD;  Location: [facility] PAIN MGT;  Service: Pain Management;  Laterality: Bilateral;    TRANSFORAMINAL EPIDURAL INJECTION OF STEROID Bilateral 03/10/2020    Procedure: Right T12/L1 TF XANDER with local;  Surgeon: Desean Dias MD;  Location: [facility] PAIN MGT;  Service: Pain Management;  Laterality: Bilateral;    TRANSFORAMINAL EPIDURAL INJECTION OF STEROID Right 06/19/2020    Procedure: Right T12/L1 TFESI Covid day of procedure;  Surgeon: Desean Dias MD;  Location: [facility] PAIN MGT;  Service: Pain Management;  Laterality: Right;       Review of patient's allergies indicates:   Allergen Reactions    Penicillins Anaphylaxis, Hives, Shortness Of Breath and Swelling    Penicillin     Adhesive Rash    Doxycycline Nausea Only    Oxycodone Other (See Comments)     Fatigue      Sulfa (sulfonamide antibiotics) Itching       Family History       Problem Relation (Age of Onset)    Breast cancer Maternal Aunt, Maternal Aunt, Maternal Aunt    No Known Problems Mother, Father          Tobacco Use    Smoking status: Former     Current packs/day: 0.00     Average packs/day: 1 pack/day for 42.3 years (42.3 ttl pk-yrs)     Types: Cigarettes     Start date: 1962     Quit date: 4/11/2004     Years  since quittin.7    Smokeless tobacco: Former   Substance and Sexual Activity    Alcohol use: No    Drug use: Never    Sexual activity: Not Currently     Partners: Male         Review of Systems   All other systems reviewed and are negative.    Objective:     Vital Signs (Most Recent):  Temp: 97.7 °F (36.5 °C) (25 1202)  Pulse: 81 (25 1400)  Resp: 20 (25 1531)  BP: (!) 151/58 (25 1202)  SpO2: 100 % (25 1018) Vital Signs (24h Range):  Temp:  [46.6 °F (8.1 °C)-97.9 °F (36.6 °C)] 97.7 °F (36.5 °C)  Pulse:  [] 81  Resp:  [18-20] 20  SpO2:  [97 %-100 %] 100 %  BP: (109-151)/(53-63) 151/58     Weight: 53.5 kg (117 lb 15.1 oz)  Body mass index is 20.25 kg/m².    No intake or output data in the 24 hours ending 25 1534     Physical Exam  Vitals and nursing note reviewed.   Constitutional:       General: She is not in acute distress.  HENT:      Head: Normocephalic and atraumatic.      Nose:      Comments: Gauze in her nose  Cardiovascular:      Rate and Rhythm: Normal rate and regular rhythm.      Pulses: Normal pulses.      Heart sounds: No murmur heard.  Pulmonary:      Effort: Pulmonary effort is normal. No respiratory distress.      Breath sounds: No wheezing, rhonchi or rales.   Abdominal:      General: Abdomen is flat. There is no distension.      Palpations: Abdomen is soft.      Tenderness: There is no abdominal tenderness.   Musculoskeletal:      Right lower leg: No edema.      Left lower leg: No edema.   Neurological:      General: No focal deficit present.      Mental Status: She is alert. Mental status is at baseline.          Vents:  Oxygen Concentration (%): 28 (25 1018)    Lines/Drains/Airways       Peripheral Intravenous Line  Duration                  Midline Catheter - Single Lumen 25 1925 Left basilic vein (medial side of arm) other (see comments) <1 day                    Significant Labs:    CBC/Anemia Profile:  Recent Labs   Lab 25  1813  "01/18/25 0424   WBC 6.13 3.98  4.08   HGB 9.0* 8.6*  8.5*   HCT 29.6* 27.4*  27.2*   * 105*  114*   * 101*  99*   RDW 14.4 14.0  14.2        Chemistries:  Recent Labs   Lab 01/17/25  0518 01/18/25 0424 01/18/25 0425   * 133*  133*  --    K 4.1 4.8  4.9  --     104  104  --    CO2 21* 22*  22*  --    BUN 65* 54*  54*  --    CREATININE 1.2 0.9  0.9  --    CALCIUM 8.6* 8.5*  8.4*  --    ALBUMIN  --  2.1*  --    PROT  --  5.7*  --    BILITOT  --  0.2  --    ALKPHOS  --  44  --    ALT  --  23  --    AST  --  23  --    MG 1.4* 2.5 2.5   PHOS 2.8  --  3.3       All pertinent labs within the past 24 hours have been reviewed.    Significant Imaging:   I have reviewed all pertinent imaging results/findings within the past 24 hours.    ABG  No results for input(s): "PH", "PO2", "PCO2", "HCO3", "BE" in the last 168 hours.  Assessment/Plan:     Pulmonary  Hemoptysis  Feel this is most likely epistaxis that is draining posteriorly and then she coughs it up  CTA chest is fairly unremarkable.    Placed on Zabrina-mask to keep the cannula out of her nose.    Afrin.    Will trial some TXA nebs for good measure, but I don't feel this is likely from a pulmonary source.    Codeine cough syrup to help with her persistent cough.          Thank you for your consult. I will follow-up with patient. Please contact us if you have any additional questions.     Brody Manley MD  Pulmonology  [facility] ([facility])    "

## 2025-01-18 NOTE — SUBJECTIVE & OBJECTIVE
Past Medical History:   Diagnosis Date    Abnormal ECG 08/05/2019    Acute bronchitis and bronchiolitis 12/31/2024    Aneurysm     Anticoagulant long-term use     Arthritis     CAD in native artery 08/05/2019    Diabetes mellitus     Fall 10/10/2019    Formatting of this note might be different from the original. Found sitting on floor next to bed last night Mild confusion today Does not recall falling or how she ended up on floor UA today Labs yesterday unremarkable    Glaucoma     Hypertension     Pulmonary fibrosis     Seizures     Shingles 05/27/2017    Stroke        Past Surgical History:   Procedure Laterality Date    BRAIN SURGERY      CARDIAC CATHETERIZATION      CATHETERIZATION OF BOTH LEFT AND RIGHT HEART N/A 5/17/2024    Procedure: CATHETERIZATION, HEART, BOTH LEFT AND RIGHT;  Surgeon: Rachelle Sarabia MD;  Location: [facility] CATH LAB;  Service: Cardiology;  Laterality: N/A;    COLONOSCOPY N/A 09/21/2023    Procedure: COLONOSCOPY;  Surgeon: Joanna Leal MD;  Location: [facility] ENDO;  Service: Endoscopy;  Laterality: N/A;    CORONARY ANGIOPLASTY      EPIDURAL STEROID INJECTION INTO CERVICAL SPINE N/A 2/7/2024    Procedure: Cervical IL XANDER, Level C6/7, RN IV sedation;  Surgeon: Francisco Oshea MD;  Location: [facility] PAIN MGT;  Service: Pain Management;  Laterality: N/A;    EPIDURAL STEROID INJECTION INTO LUMBAR SPINE Bilateral 11/12/2019    Procedure: TF XANDER L4/5;  Surgeon: Desean Dias MD;  Location: [facility] PAIN MGT;  Service: Pain Management;  Laterality: Bilateral;    HYSTERECTOMY      INJECTION OF ANESTHETIC AGENT AROUND MEDIAL BRANCH NERVES INNERVATING LUMBAR FACET JOINT Bilateral 01/31/2020    Procedure: Bilateral L3-5 MBB;  Surgeon: Desean Dias MD;  Location: [facility] PAIN MGT;  Service: Pain Management;  Laterality: Bilateral;    INJECTION OF ANESTHETIC AGENT INTO SACROILIAC JOINT Right 11/16/2021    Procedure: Right BLOCK, SACROILIAC JOINT Right GTB with RN IV sedation;  Surgeon: Yao Fulton MD;   Location: [facility] PAIN MGT;  Service: Pain Management;  Laterality: Right;    INJECTION OF ANESTHETIC AGENT INTO SACROILIAC JOINT Bilateral 07/28/2023    Procedure: Bilateral GT bursa + bilateral SIJ injection;  Surgeon: Francisco Oshea MD;  Location: [facility] PAIN MGT;  Service: Pain Management;  Laterality: Bilateral;    INJECTION OF JOINT Bilateral 07/09/2020    Procedure: Bilateral shoulder GH Joint injection with local;  Surgeon: Desean Dias MD;  Location: [facility] PAIN MGT;  Service: Pain Management;  Laterality: Bilateral;    INJECTION OF JOINT Bilateral 07/28/2023    Procedure: Bilateral GT bursa + bilateral SIJ injection NEEDS ADDITIONAL SEDATION AND MORE TIME BEFORE INJECTION RN IV Sedation;  Surgeon: Francisco Oshea MD;  Location: [facility] PAIN MGT;  Service: Pain Management;  Laterality: Bilateral;    INJECTION OF JOINT N/A 10/25/2023    Procedure: Sacrococcygeal joint injection;  Surgeon: Francisco Oshea MD;  Location: [facility] PAIN MGT;  Service: Pain Management;  Laterality: N/A;    OOPHORECTOMY      SELECTIVE INJECTION OF ANESTHETIC AGENT AROUND LUMBAR SPINAL NERVE ROOT BY TRANSFORAMINAL APPROACH Bilateral 04/26/2023    Procedure: Bilateral L4/5 TF XANDER RN IV Sedation;  Surgeon: Francisco Oshea MD;  Location: [facility] PAIN MGT;  Service: Pain Management;  Laterality: Bilateral;    TRANSFORAMINAL EPIDURAL INJECTION OF STEROID Bilateral 07/23/2019    Procedure: Bilateral L3/4 Transforaminal Epidural Steroid Injection;  Surgeon: Desean Dias MD;  Location: [facility] PAIN MGT;  Service: Pain Management;  Laterality: Bilateral;    TRANSFORAMINAL EPIDURAL INJECTION OF STEROID Bilateral 03/10/2020    Procedure: Right T12/L1 TF XANDER with local;  Surgeon: Desean Dias MD;  Location: [facility] PAIN MGT;  Service: Pain Management;  Laterality: Bilateral;    TRANSFORAMINAL EPIDURAL INJECTION OF STEROID Right 06/19/2020    Procedure: Right T12/L1 TFESI Covid day of procedure;  Surgeon: Desean Dias MD;  Location: [facility] PAIN MGT;   Service: Pain Management;  Laterality: Right;       Review of patient's allergies indicates:   Allergen Reactions    Penicillins Anaphylaxis, Hives, Shortness Of Breath and Swelling    Penicillin     Adhesive Rash    Doxycycline Nausea Only    Oxycodone Other (See Comments)     Fatigue      Sulfa (sulfonamide antibiotics) Itching       Family History       Problem Relation (Age of Onset)    Breast cancer Maternal Aunt, Maternal Aunt, Maternal Aunt    No Known Problems Mother, Father          Tobacco Use    Smoking status: Former     Current packs/day: 0.00     Average packs/day: 1 pack/day for 42.3 years (42.3 ttl pk-yrs)     Types: Cigarettes     Start date:      Quit date: 2004     Years since quittin.7    Smokeless tobacco: Former   Substance and Sexual Activity    Alcohol use: No    Drug use: Never    Sexual activity: Not Currently     Partners: Male         Review of Systems   All other systems reviewed and are negative.    Objective:     Vital Signs (Most Recent):  Temp: 97.7 °F (36.5 °C) (25 1202)  Pulse: 81 (25 1400)  Resp: 20 (25 1531)  BP: (!) 151/58 (25 1202)  SpO2: 100 % (25 1018) Vital Signs (24h Range):  Temp:  [46.6 °F (8.1 °C)-97.9 °F (36.6 °C)] 97.7 °F (36.5 °C)  Pulse:  [] 81  Resp:  [18-20] 20  SpO2:  [97 %-100 %] 100 %  BP: (109-151)/(53-63) 151/58     Weight: 53.5 kg (117 lb 15.1 oz)  Body mass index is 20.25 kg/m².    No intake or output data in the 24 hours ending 25 1534     Physical Exam  Vitals and nursing note reviewed.   Constitutional:       General: She is not in acute distress.  HENT:      Head: Normocephalic and atraumatic.      Nose:      Comments: Gauze in her nose  Cardiovascular:      Rate and Rhythm: Normal rate and regular rhythm.      Pulses: Normal pulses.      Heart sounds: No murmur heard.  Pulmonary:      Effort: Pulmonary effort is normal. No respiratory distress.      Breath sounds: No wheezing, rhonchi or rales.    Abdominal:      General: Abdomen is flat. There is no distension.      Palpations: Abdomen is soft.      Tenderness: There is no abdominal tenderness.   Musculoskeletal:      Right lower leg: No edema.      Left lower leg: No edema.   Neurological:      General: No focal deficit present.      Mental Status: She is alert. Mental status is at baseline.          Vents:  Oxygen Concentration (%): 28 (01/18/25 1018)    Lines/Drains/Airways       Peripheral Intravenous Line  Duration                  Midline Catheter - Single Lumen 01/17/25 1925 Left basilic vein (medial side of arm) other (see comments) <1 day                    Significant Labs:    CBC/Anemia Profile:  Recent Labs   Lab 01/17/25 0518 01/18/25 0424   WBC 6.13 3.98  4.08   HGB 9.0* 8.6*  8.5*   HCT 29.6* 27.4*  27.2*   * 105*  114*   * 101*  99*   RDW 14.4 14.0  14.2        Chemistries:  Recent Labs   Lab 01/17/25 0518 01/18/25 0424 01/18/25  0425   * 133*  133*  --    K 4.1 4.8  4.9  --     104  104  --    CO2 21* 22*  22*  --    BUN 65* 54*  54*  --    CREATININE 1.2 0.9  0.9  --    CALCIUM 8.6* 8.5*  8.4*  --    ALBUMIN  --  2.1*  --    PROT  --  5.7*  --    BILITOT  --  0.2  --    ALKPHOS  --  44  --    ALT  --  23  --    AST  --  23  --    MG 1.4* 2.5 2.5   PHOS 2.8  --  3.3       All pertinent labs within the past 24 hours have been reviewed.    Significant Imaging:   I have reviewed all pertinent imaging results/findings within the past 24 hours.

## 2025-01-18 NOTE — ASSESSMENT & PLAN NOTE
Feel this is most likely epistaxis that is draining posteriorly and then she coughs it up  CTA chest is fairly unremarkable.    Placed on Zabrina-mask to keep the cannula out of her nose.    Afrin.    Will trial some TXA nebs for good measure, but I don't feel this is likely from a pulmonary source.    Codeine cough syrup to help with her persistent cough.

## 2025-01-18 NOTE — SIGNIFICANT EVENT
Patient coughing up blood with sizable blood clots noted.  On exam, she is coughing almost continuously and blood is coming out every time.  No respiratory distress, but she is quite concerned and anxious.  Remains on 3.5 L/min NC with adequate SpO2    D/C'd Lovenox and Plavix  Ordered tranexamic acid neb x one  Re-consult pulmonology  Patient is DNR, but is not hospice patient  Ordering stat labs now to include CBC, PT, INR, PTT, fibrinogen, and chemistry

## 2025-01-19 PROBLEM — J34.2 NASAL SEPTAL DEVIATION: Status: ACTIVE | Noted: 2025-01-19

## 2025-01-19 PROBLEM — I48.91 ATRIAL FIBRILLATION: Status: ACTIVE | Noted: 2025-01-19

## 2025-01-19 LAB
ANION GAP SERPL CALC-SCNC: 12 MMOL/L (ref 8–16)
ANION GAP SERPL CALC-SCNC: 9 MMOL/L (ref 8–16)
BASOPHILS # BLD AUTO: 0.01 K/UL (ref 0–0.2)
BASOPHILS NFR BLD: 0.2 % (ref 0–1.9)
BUN SERPL-MCNC: 57 MG/DL (ref 8–23)
BUN SERPL-MCNC: 59 MG/DL (ref 8–23)
CALCIUM SERPL-MCNC: 9.3 MG/DL (ref 8.7–10.5)
CALCIUM SERPL-MCNC: 9.3 MG/DL (ref 8.7–10.5)
CHLORIDE SERPL-SCNC: 104 MMOL/L (ref 95–110)
CHLORIDE SERPL-SCNC: 105 MMOL/L (ref 95–110)
CO2 SERPL-SCNC: 17 MMOL/L (ref 23–29)
CO2 SERPL-SCNC: 20 MMOL/L (ref 23–29)
CREAT SERPL-MCNC: 1.2 MG/DL (ref 0.5–1.4)
CREAT SERPL-MCNC: 1.2 MG/DL (ref 0.5–1.4)
DIFFERENTIAL METHOD BLD: ABNORMAL
EOSINOPHIL # BLD AUTO: 0 K/UL (ref 0–0.5)
EOSINOPHIL NFR BLD: 0.2 % (ref 0–8)
ERYTHROCYTE [DISTWIDTH] IN BLOOD BY AUTOMATED COUNT: 14.3 % (ref 11.5–14.5)
EST. GFR  (NO RACE VARIABLE): 46 ML/MIN/1.73 M^2
EST. GFR  (NO RACE VARIABLE): 46 ML/MIN/1.73 M^2
GLUCOSE SERPL-MCNC: 176 MG/DL (ref 70–110)
GLUCOSE SERPL-MCNC: 85 MG/DL (ref 70–110)
HCT VFR BLD AUTO: 30.4 % (ref 37–48.5)
HGB BLD-MCNC: 9.2 G/DL (ref 12–16)
IMM GRANULOCYTES # BLD AUTO: 0.04 K/UL (ref 0–0.04)
IMM GRANULOCYTES NFR BLD AUTO: 0.7 % (ref 0–0.5)
LYMPHOCYTES # BLD AUTO: 0.7 K/UL (ref 1–4.8)
LYMPHOCYTES NFR BLD: 12 % (ref 18–48)
MAGNESIUM SERPL-MCNC: 2.4 MG/DL (ref 1.6–2.6)
MCH RBC QN AUTO: 31 PG (ref 27–31)
MCHC RBC AUTO-ENTMCNC: 30.3 G/DL (ref 32–36)
MCV RBC AUTO: 102 FL (ref 82–98)
MONOCYTES # BLD AUTO: 0.3 K/UL (ref 0.3–1)
MONOCYTES NFR BLD: 5.1 % (ref 4–15)
NEUTROPHILS # BLD AUTO: 4.7 K/UL (ref 1.8–7.7)
NEUTROPHILS NFR BLD: 81.8 % (ref 38–73)
NRBC BLD-RTO: 0 /100 WBC
PHOSPHATE SERPL-MCNC: 4.6 MG/DL (ref 2.7–4.5)
PLATELET # BLD AUTO: 147 K/UL (ref 150–450)
PMV BLD AUTO: 11 FL (ref 9.2–12.9)
POTASSIUM SERPL-SCNC: 5.2 MMOL/L (ref 3.5–5.1)
POTASSIUM SERPL-SCNC: 5.3 MMOL/L (ref 3.5–5.1)
RBC # BLD AUTO: 2.97 M/UL (ref 4–5.4)
SODIUM SERPL-SCNC: 133 MMOL/L (ref 136–145)
SODIUM SERPL-SCNC: 134 MMOL/L (ref 136–145)
WBC # BLD AUTO: 5.73 K/UL (ref 3.9–12.7)

## 2025-01-19 PROCEDURE — 94761 N-INVAS EAR/PLS OXIMETRY MLT: CPT

## 2025-01-19 PROCEDURE — 25000003 PHARM REV CODE 250: Performed by: INTERNAL MEDICINE

## 2025-01-19 PROCEDURE — 99223 1ST HOSP IP/OBS HIGH 75: CPT | Mod: 25,,, | Performed by: OTOLARYNGOLOGY

## 2025-01-19 PROCEDURE — 30903 CONTROL OF NOSEBLEED: CPT | Mod: 50,,, | Performed by: OTOLARYNGOLOGY

## 2025-01-19 PROCEDURE — 21400001 HC TELEMETRY ROOM

## 2025-01-19 PROCEDURE — 63600175 PHARM REV CODE 636 W HCPCS: Performed by: STUDENT IN AN ORGANIZED HEALTH CARE EDUCATION/TRAINING PROGRAM

## 2025-01-19 PROCEDURE — 99900035 HC TECH TIME PER 15 MIN (STAT)

## 2025-01-19 PROCEDURE — 25000003 PHARM REV CODE 250: Performed by: NURSE PRACTITIONER

## 2025-01-19 PROCEDURE — 36415 COLL VENOUS BLD VENIPUNCTURE: CPT | Performed by: STUDENT IN AN ORGANIZED HEALTH CARE EDUCATION/TRAINING PROGRAM

## 2025-01-19 PROCEDURE — 25000003 PHARM REV CODE 250: Performed by: STUDENT IN AN ORGANIZED HEALTH CARE EDUCATION/TRAINING PROGRAM

## 2025-01-19 PROCEDURE — 63600175 PHARM REV CODE 636 W HCPCS: Performed by: NURSE PRACTITIONER

## 2025-01-19 PROCEDURE — 85025 COMPLETE CBC W/AUTO DIFF WBC: CPT | Performed by: STUDENT IN AN ORGANIZED HEALTH CARE EDUCATION/TRAINING PROGRAM

## 2025-01-19 PROCEDURE — 093K7ZZ CONTROL BLEEDING IN NASAL MUCOSA AND SOFT TISSUE, VIA NATURAL OR ARTIFICIAL OPENING: ICD-10-PCS | Performed by: OTOLARYNGOLOGY

## 2025-01-19 PROCEDURE — 94640 AIRWAY INHALATION TREATMENT: CPT

## 2025-01-19 PROCEDURE — 84100 ASSAY OF PHOSPHORUS: CPT | Performed by: STUDENT IN AN ORGANIZED HEALTH CARE EDUCATION/TRAINING PROGRAM

## 2025-01-19 PROCEDURE — 25000242 PHARM REV CODE 250 ALT 637 W/ HCPCS: Performed by: STUDENT IN AN ORGANIZED HEALTH CARE EDUCATION/TRAINING PROGRAM

## 2025-01-19 PROCEDURE — 80048 BASIC METABOLIC PNL TOTAL CA: CPT | Mod: 91 | Performed by: STUDENT IN AN ORGANIZED HEALTH CARE EDUCATION/TRAINING PROGRAM

## 2025-01-19 PROCEDURE — 27100171 HC OXYGEN HIGH FLOW UP TO 24 HOURS

## 2025-01-19 PROCEDURE — 83735 ASSAY OF MAGNESIUM: CPT | Performed by: STUDENT IN AN ORGANIZED HEALTH CARE EDUCATION/TRAINING PROGRAM

## 2025-01-19 RX ORDER — LIDOCAINE AND PRILOCAINE 25; 25 MG/G; MG/G
CREAM TOPICAL ONCE
Status: COMPLETED | OUTPATIENT
Start: 2025-01-19 | End: 2025-01-19

## 2025-01-19 RX ORDER — GABAPENTIN 300 MG/1
300 CAPSULE ORAL 3 TIMES DAILY
Status: DISCONTINUED | OUTPATIENT
Start: 2025-01-19 | End: 2025-01-21

## 2025-01-19 RX ORDER — BENZONATATE 100 MG/1
200 CAPSULE ORAL 3 TIMES DAILY
Status: DISCONTINUED | OUTPATIENT
Start: 2025-01-19 | End: 2025-01-28 | Stop reason: HOSPADM

## 2025-01-19 RX ADMIN — GUAIFENESIN 1200 MG: 600 TABLET, EXTENDED RELEASE ORAL at 09:01

## 2025-01-19 RX ADMIN — MONTELUKAST 10 MG: 10 TABLET, FILM COATED ORAL at 09:01

## 2025-01-19 RX ADMIN — ALPRAZOLAM 0.25 MG: 0.25 TABLET ORAL at 08:01

## 2025-01-19 RX ADMIN — SODIUM ZIRCONIUM CYCLOSILICATE 5 G: 5 POWDER, FOR SUSPENSION ORAL at 09:01

## 2025-01-19 RX ADMIN — BUDESONIDE INHALATION 0.5 MG: 0.5 SUSPENSION RESPIRATORY (INHALATION) at 07:01

## 2025-01-19 RX ADMIN — ARFORMOTEROL TARTRATE 15 MCG: 15 SOLUTION RESPIRATORY (INHALATION) at 07:01

## 2025-01-19 RX ADMIN — MORPHINE SULFATE 2 MG: 2 INJECTION, SOLUTION INTRAMUSCULAR; INTRAVENOUS at 02:01

## 2025-01-19 RX ADMIN — BENZONATATE 200 MG: 100 CAPSULE ORAL at 02:01

## 2025-01-19 RX ADMIN — PREDNISONE 40 MG: 20 TABLET ORAL at 09:01

## 2025-01-19 RX ADMIN — PROMETHAZINE HYDROCHLORIDE AND CODEINE PHOSPHATE 5 ML: 6.25; 1 SOLUTION ORAL at 02:01

## 2025-01-19 RX ADMIN — ESCITALOPRAM OXALATE 20 MG: 10 TABLET, FILM COATED ORAL at 09:01

## 2025-01-19 RX ADMIN — ALPRAZOLAM 0.25 MG: 0.25 TABLET ORAL at 09:01

## 2025-01-19 RX ADMIN — BENZONATATE 200 MG: 100 CAPSULE ORAL at 08:01

## 2025-01-19 RX ADMIN — OXYCODONE HYDROCHLORIDE AND ACETAMINOPHEN 500 MG: 500 TABLET ORAL at 09:01

## 2025-01-19 RX ADMIN — CEFEPIME 2 G: 2 INJECTION, POWDER, FOR SOLUTION INTRAVENOUS at 11:01

## 2025-01-19 RX ADMIN — ATORVASTATIN CALCIUM 40 MG: 40 TABLET, FILM COATED ORAL at 09:01

## 2025-01-19 RX ADMIN — MORPHINE SULFATE 2 MG: 2 INJECTION, SOLUTION INTRAMUSCULAR; INTRAVENOUS at 09:01

## 2025-01-19 RX ADMIN — SALINE NASAL SPRAY 1 SPRAY: 1.5 SOLUTION NASAL at 10:01

## 2025-01-19 RX ADMIN — LEVETIRACETAM 1500 MG: 500 TABLET, FILM COATED ORAL at 09:01

## 2025-01-19 RX ADMIN — GABAPENTIN 300 MG: 300 CAPSULE ORAL at 08:01

## 2025-01-19 RX ADMIN — GABAPENTIN 200 MG: 100 CAPSULE ORAL at 09:01

## 2025-01-19 RX ADMIN — TRANEXAMIC ACID 500 MG: 100 INJECTION, SOLUTION INTRAVENOUS at 07:01

## 2025-01-19 RX ADMIN — PANTOPRAZOLE SODIUM 40 MG: 40 TABLET, DELAYED RELEASE ORAL at 09:01

## 2025-01-19 RX ADMIN — BENZONATATE 100 MG: 100 CAPSULE ORAL at 09:01

## 2025-01-19 RX ADMIN — THERA TABS 1 TABLET: TAB at 09:01

## 2025-01-19 RX ADMIN — POLYETHYLENE GLYCOL 3350 17 G: 17 POWDER, FOR SOLUTION ORAL at 09:01

## 2025-01-19 RX ADMIN — PANTOPRAZOLE SODIUM 40 MG: 40 TABLET, DELAYED RELEASE ORAL at 08:01

## 2025-01-19 RX ADMIN — GUAIFENESIN 1200 MG: 600 TABLET, EXTENDED RELEASE ORAL at 08:01

## 2025-01-19 RX ADMIN — LEVETIRACETAM 1500 MG: 500 TABLET, FILM COATED ORAL at 08:01

## 2025-01-19 RX ADMIN — LIDOCAINE AND PRILOCAINE: 25; 25 CREAM TOPICAL at 07:01

## 2025-01-19 RX ADMIN — PROMETHAZINE HYDROCHLORIDE AND CODEINE PHOSPHATE 5 ML: 6.25; 1 SOLUTION ORAL at 08:01

## 2025-01-19 RX ADMIN — GABAPENTIN 300 MG: 300 CAPSULE ORAL at 02:01

## 2025-01-19 RX ADMIN — PROMETHAZINE HYDROCHLORIDE AND CODEINE PHOSPHATE 5 ML: 6.25; 1 SOLUTION ORAL at 09:01

## 2025-01-19 RX ADMIN — FUROSEMIDE 20 MG: 20 TABLET ORAL at 09:01

## 2025-01-19 RX ADMIN — OXYCODONE HYDROCHLORIDE AND ACETAMINOPHEN 500 MG: 500 TABLET ORAL at 08:01

## 2025-01-19 NOTE — PLAN OF CARE
A223/A223 OLU Felix is a 79 y.o.female admitted on 12/31/2024 for COVID-19   Code Status: DNR MRN: [id number]   Review of patient's allergies indicates:   Allergen Reactions    Penicillins Anaphylaxis, Hives, Shortness Of Breath and Swelling    Penicillin     Adhesive Rash    Doxycycline Nausea Only    Oxycodone Other (See Comments)     Fatigue      Sulfa (sulfonamide antibiotics) Itching     Past Medical History:   Diagnosis Date    Abnormal ECG 08/05/2019    Acute bronchitis and bronchiolitis 12/31/2024    Aneurysm     Anticoagulant long-term use     Arthritis     CAD in native artery 08/05/2019    Diabetes mellitus     Fall 10/10/2019    Formatting of this note might be different from the original. Found sitting on floor next to bed last night Mild confusion today Does not recall falling or how she ended up on floor UA today Labs yesterday unremarkable    Glaucoma     Hypertension     Pulmonary fibrosis     Seizures     Shingles 05/27/2017    Stroke       PRN meds    ALPRAZolam, 0.25 mg, TID PRN  dextrose 50%, 12.5 g, PRN  dextrose 50%, 25 g, PRN  glucagon (human recombinant), 1 mg, PRN  glucose, 16 g, PRN  glucose, 24 g, PRN  ipratropium, 0.5 mg, Q4H PRN  magic mouthwash (diphenhydrAMINE 12.5 mg/5 mL 20 mL, aluminum & magnesium hydroxide-simethicone (MYLANTA) 20 mL, LIDOcaine HCl 2% (XYLOCAINE) 20 mL) solution, 10 mL, Q4H PRN  melatonin, 6 mg, Nightly PRN  methocarbamoL, 500 mg, TID PRN  morphine, 2 mg, Q4H PRN  ondansetron, 4 mg, Q8H PRN  promethazine-codeine 6.25-10 mg/5 ml, 5 mL, Q4H PRN  sodium chloride 0.9%, 10 mL, PRN      Chart check completed. Will continue plan of care.      Orientation: oriented x 4  [place] Coma Scale Score: 15     Lead Monitored: Lead II Rhythm: normal sinus rhythm Frequency/Ectopy: PVCs  Cardiac/Telemetry Box Number: [id number]  VTE Core Measure: Pharmacological prophylaxis initiated/maintained Last Bowel Movement: 01/17/25  Diet Cardiac Isolation Tray - Styrofoam  Voiding  Characteristics: incontinence  Gilberto Score: 15  Fall Risk Score: 19  Accucheck []   Freq?      Lines/Drains/Airways       Peripheral Intravenous Line  Duration                  Midline Catheter - Single Lumen 01/17/25 1925 Left basilic vein (medial side of arm) other (see comments) 1 day

## 2025-01-19 NOTE — ASSESSMENT & PLAN NOTE
1/17/25: Patient more lethargic, with worsening cough and shortness of breath.  CXR with possible left-sided infiltrate.  I suspect postviral pneumonia, hospital-acquired.  Blood and sputum cultures pending.  Lactate is normal.  Has a penicillin allergy.  Started on cefepime.  Providing supplemental oxygen and we will monitor closely. Would plan to complete a 7 day course, or so

## 2025-01-19 NOTE — ASSESSMENT & PLAN NOTE
Anemia is likely due to chronic disease due to Chronic Kidney Disease. Most recent hemoglobin and hematocrit are listed below.  Recent Labs     01/17/25  0518 01/18/25  0424 01/19/25  0453   HGB 9.0* 8.6*  8.5* 9.2*   HCT 29.6* 27.4*  27.2* 30.4*       Plan  - Monitor serial CBC: Daily  - Transfuse PRBC if patient becomes hemodynamically unstable, symptomatic or H/H drops below 7/21.  - Patient has not received any PRBC transfusions to date  - Patient's anemia is currently stable  - the epistaxis ongoing for a few days did lower the hemoglobin by a significant quantity, now that the epistaxis has been addressed the hemoglobin came up slightly from yesterday

## 2025-01-19 NOTE — ASSESSMENT & PLAN NOTE
Patient is identified as High risk for severe complications of COVID 19 based on COVID risk score of 10   Initiate standard COVID protocols; COVID-19 testing ,Infection Control notification  and isolation- respiratory, contact and droplet per protocol    -Continue steroids x 10 days total, stop date 1/10  -Given hx of pulm fibrosis, likely won't respond to bronchodilators- scheduled nebs stopped, changed to prn  -Guaifenesin changed to Guaifenesin DM to see if that will help her coughing fits  -New Echo pending- does not seem volume overloaded on exam, consider stopping Lasix depending on BNP and repeat Echo  -CTA Chest pending  -Xanax and Morphine prn tachypnea and anxiety, educated patient about slow breathing/pursed lip breathing  -ABG has not shown hypoxia, hyperventilation with low CO2  -Discussed case with hospital medicine    1/8: Patient reports feeling much better with xanax and morphine on board  Discussed possible hospice vs. Palliative care at home, patient is interested. Says she is tired of being in and out of the hospital. Social work consulted, discussed with primary team.    1/19 - continue cough syrup.  Encouraged PT/OT and mobilization.

## 2025-01-19 NOTE — PROGRESS NOTES
[facility] - Telemetry [facility])  Pulmonology  Progress Note    Patient Name: Shireen Felix  MRN: [id number]  Admission Date: 12/31/2024  Hospital Length of Stay: 18 days  Code Status: DNR  Attending Provider: Kristopher Wynn MD  Primary Care Provider: Laron Chaudhari MD   Principal Problem: UIP (usual interstitial pneumonitis)    Subjective:     Interval History: No acute events.  Hemoptysis/epistaxis improved.  ENT cauterized septal perforation.  Cough improved though persistent.      Objective:     Vital Signs (Most Recent):  Temp: 97.5 °F (36.4 °C) (01/19/25 1145)  Pulse: 69 (01/19/25 1145)  Resp: 17 (01/19/25 1145)  BP: (!) 122/58 (01/19/25 1145)  SpO2: 100 % (01/19/25 1145) Vital Signs (24h Range):  Temp:  [97.2 °F (36.2 °C)-97.5 °F (36.4 °C)] 97.5 °F (36.4 °C)  Pulse:  [54-88] 69  Resp:  [17-20] 17  SpO2:  [93 %-100 %] 100 %  BP: (122-159)/(58-69) 122/58     Weight: 54.2 kg (119 lb 7.8 oz)  Body mass index is 20.51 kg/m².      Intake/Output Summary (Last 24 hours) at 1/19/2025 1305  Last data filed at 1/19/2025 0815  Gross per 24 hour   Intake 360 ml   Output --   Net 360 ml        Physical Exam  Vitals and nursing note reviewed.   Constitutional:       General: She is not in acute distress.     Appearance: She is ill-appearing.   Cardiovascular:      Rate and Rhythm: Normal rate and regular rhythm.      Pulses: Normal pulses.      Heart sounds: No murmur heard.  Pulmonary:      Effort: Pulmonary effort is normal. No respiratory distress.      Breath sounds: Rales present. No wheezing or rhonchi.   Abdominal:      General: Abdomen is flat. There is no distension.      Palpations: Abdomen is soft.      Tenderness: There is no abdominal tenderness.   Skin:     General: Skin is warm and dry.      Coloration: Skin is not jaundiced or pale.   Neurological:      General: No focal deficit present.      Mental Status: She is alert and oriented to person, place, and time. Mental status is at baseline.          "  Review of Systems    Vents:  Oxygen Concentration (%): 28 (01/19/25 0724)    Lines/Drains/Airways       Peripheral Intravenous Line  Duration                  Midline Catheter - Single Lumen 01/17/25 1925 Left basilic vein (medial side of arm) other (see comments) 1 day                    Significant Labs:    CBC/Anemia Profile:  Recent Labs   Lab 01/18/25 0424 01/19/25 0453   WBC 3.98  4.08 5.73   HGB 8.6*  8.5* 9.2*   HCT 27.4*  27.2* 30.4*   *  114* 147*   *  99* 102*   RDW 14.0  14.2 14.3        Chemistries:  Recent Labs   Lab 01/18/25 0424 01/18/25 0425 01/19/25 0453   *  133*  --  133*   K 4.8  4.9  --  5.3*     104  --  104   CO2 22*  22*  --  20*   BUN 54*  54*  --  57*   CREATININE 0.9  0.9  --  1.2   CALCIUM 8.5*  8.4*  --  9.3   ALBUMIN 2.1*  --   --    PROT 5.7*  --   --    BILITOT 0.2  --   --    ALKPHOS 44  --   --    ALT 23  --   --    AST 23  --   --    MG 2.5 2.5 2.4   PHOS  --  3.3 4.6*       All pertinent labs within the past 24 hours have been reviewed.    Significant Imaging:  I have reviewed all pertinent imaging results/findings within the past 24 hours.    ABG  No results for input(s): "PH", "PO2", "PCO2", "HCO3", "BE" in the last 168 hours.  Assessment/Plan:     Pulmonary  * UIP (usual interstitial pneumonitis)  Pulm fibrosis hx- followed by pulmonology outpatient  -Will need close pulm f/u after discharge    Hemoptysis  Feel this is most likely epistaxis that is draining posteriorly and then she coughs it up  CTA chest is fairly unremarkable.    Placed on Zabrina-mask to keep the cannula out of her nose.    Afrin.    Will trial some TXA nebs for good measure, but I don't feel this is likely from a pulmonary source.    Codeine cough syrup to help with her persistent cough.    1/19 - now s/p cautery by ENT with seeming resolution of symptoms    ID  COVID-19  Patient is identified as High risk for severe complications of COVID 19 based on COVID risk " score of 10   Initiate standard COVID protocols; COVID-19 testing ,Infection Control notification  and isolation- respiratory, contact and droplet per protocol    -Continue steroids x 10 days total, stop date 1/10  -Given hx of pulm fibrosis, likely won't respond to bronchodilators- scheduled nebs stopped, changed to prn  -Guaifenesin changed to Guaifenesin DM to see if that will help her coughing fits  -New Echo pending- does not seem volume overloaded on exam, consider stopping Lasix depending on BNP and repeat Echo  -CTA Chest pending  -Xanax and Morphine prn tachypnea and anxiety, educated patient about slow breathing/pursed lip breathing  -ABG has not shown hypoxia, hyperventilation with low CO2  -Discussed case with hospital medicine    1/8: Patient reports feeling much better with xanax and morphine on board  Discussed possible hospice vs. Palliative care at home, patient is interested. Says she is tired of being in and out of the hospital. Social work consulted, discussed with primary team.    1/19 - continue cough syrup.  Encouraged PT/OT and mobilization.           Brody Manley MD  Pulmonology  [facility] - Telemetry ([facility])

## 2025-01-19 NOTE — ASSESSMENT & PLAN NOTE
Hx of seizures, patient has not had a seizure during this admission.   At home is on seizure medication Aptiom 400mg daily and Keppra 1,000mg BID  Aptiom likely worsening hyponatremia and also inhibiting SNF placement due to cost, so discussed with neuro on call  Per neuro on call, would DC Aptiom and increase Keppra to 1,500mg BID, change made  Can follow up with neuro as outpatient to revisit  There remains mild hyponatremia

## 2025-01-19 NOTE — ASSESSMENT & PLAN NOTE
Pulm fibrosis hx- followed by pulmonology outpatient  Likely had flare of pulmonary fibrosis secondary to the COVID infection  This likely explains her continued symptoms, hypoxia, cough, etc.   We had been initially discussing whether patient would need to transitioned to hospice as she was requiring up to 10 L  Oxygen requirement has improved significantly, now on Ventimask only to avoid nasal cannula, but truly requires only about 1 L at this time   Still having very significant cough which we are working to control  Plan to discharge to rehab when medically stable

## 2025-01-19 NOTE — ASSESSMENT & PLAN NOTE
Likely secondary to the epistaxis.  CT with and without contrast negative.  On TXA nebs.  Likely exacerbated by the coughing so adding cough syrup with codeine today in addition to her other antitussives

## 2025-01-19 NOTE — PLAN OF CARE
Problem: Adult Inpatient Plan of Care  Goal: Plan of Care Review  Outcome: Progressing  Goal: Patient-Specific Goal (Individualized)  Outcome: Progressing  Goal: Absence of Hospital-Acquired Illness or Injury  Outcome: Progressing  Goal: Optimal Comfort and Wellbeing  Outcome: Progressing  Goal: Readiness for Transition of Care  Outcome: Progressing     Problem: Diabetes Comorbidity  Goal: Blood Glucose Level Within Targeted Range  Outcome: Progressing     Problem: Fall Injury Risk  Goal: Absence of Fall and Fall-Related Injury  Outcome: Progressing     Problem: Wound  Goal: Optimal Coping  Outcome: Progressing  Goal: Optimal Functional Ability  Outcome: Progressing  Goal: Absence of Infection Signs and Symptoms  Outcome: Progressing  Goal: Improved Oral Intake  Outcome: Progressing  Goal: Optimal Pain Control and Function  Outcome: Progressing  Goal: Skin Health and Integrity  Outcome: Progressing  Goal: Optimal Wound Healing  Outcome: Progressing     Problem: Skin Injury Risk Increased  Goal: Skin Health and Integrity  Outcome: Progressing     Problem: Coping Ineffective  Goal: Effective Coping  Outcome: Progressing     Problem: Pneumonia  Goal: Fluid Balance  Outcome: Progressing  Goal: Resolution of Infection Signs and Symptoms  Outcome: Progressing  Goal: Effective Oxygenation and Ventilation  Outcome: Progressing     Problem: Infection  Goal: Absence of Infection Signs and Symptoms  Outcome: Progressing

## 2025-01-19 NOTE — CONSULTS
'[place] [facility]  Otorhinolaryngology-Head & Neck Surgery  Consult Note    Patient Name: Shireen Felix  MRN: [id number]  Code Status: DNR  Admission Date: 12/31/2024  Hospital Length of Stay: 18 days  Attending Physician: Kristopher Wynn MD  Primary Care Provider: Laron Chaudhari MD    Consults  Subjective:     Chief Complaint/Reason for Admission: epistaxis    History of Present Illness: 80 y/o female admitted for respiratory failure, contributing factors including pulmonary fibrosis and COVID 19 infection.  She recently began having recurrent anterior epistaxis that has been difficult to control.  The hospitalist service tried topical decongestant spray and packing without lasting results.  ENT was consulted for evaluation.    Medications:  Continuous Infusions:  Scheduled Meds:   arformoteroL  15 mcg Nebulization BID    ascorbic acid (vitamin C)  500 mg Oral BID    atorvastatin  40 mg Oral Daily    benzonatate  100 mg Oral TID    budesonide  0.5 mg Nebulization Q12H    ceFEPime IV (PEDS and ADULTS)  2 g Intravenous Q12H    EScitalopram oxalate  20 mg Oral Daily    furosemide  20 mg Oral Daily    gabapentin  200 mg Oral TID    guaiFENesin  1,200 mg Oral BID    levETIRAcetam  1,500 mg Oral BID    montelukast  10 mg Oral Daily    multivitamin  1 tablet Oral Daily    pantoprazole  40 mg Oral BID    polyethylene glycol  17 g Oral Daily    predniSONE  40 mg Oral Daily    sodium zirconium cyclosilicate  5 g Oral Once     PRN Meds:  Current Facility-Administered Medications:     ALPRAZolam, 0.25 mg, Oral, TID PRN    dextrose 50%, 12.5 g, Intravenous, PRN    dextrose 50%, 25 g, Intravenous, PRN    glucagon (human recombinant), 1 mg, Intramuscular, PRN    glucose, 16 g, Oral, PRN    glucose, 24 g, Oral, PRN    ipratropium, 0.5 mg, Nebulization, Q4H PRN    magic mouthwash (diphenhydrAMINE 12.5 mg/5 mL 20 mL, aluminum & magnesium hydroxide-simethicone (MYLANTA) 20 mL, LIDOcaine HCl 2% (XYLOCAINE) 20  mL) solution, 10 mL, Swish & Spit, Q4H PRN    melatonin, 6 mg, Oral, Nightly PRN    methocarbamoL, 500 mg, Oral, TID PRN    morphine, 2 mg, Intravenous, Q4H PRN    ondansetron, 4 mg, Intravenous, Q8H PRN    promethazine-codeine 6.25-10 mg/5 ml, 5 mL, Oral, Q4H PRN    sodium chloride 0.9%, 10 mL, Intravenous, PRN     No current facility-administered medications on file prior to encounter.     Current Outpatient Medications on File Prior to Encounter   Medication Sig    albuterol (PROVENTIL) 2.5 mg /3 mL (0.083 %) nebulizer solution Take 3 mLs (2.5 mg total) by nebulization every 4 (four) hours as needed for Wheezing. Rescue    albuterol-budesonide (AIRSUPRA) 90-80 mcg/actuation Inhale 2 puffs into the lungs every 4 (four) hours as needed (wheezing, cough).    APTIOM 400 mg Tab tablet Take 400 mg by mouth once daily.    atorvastatin (LIPITOR) 40 MG tablet Take 1 tablet (40 mg total) by mouth once daily.    bisoprolol (ZEBETA) 5 MG tablet Take 1 tablet (5 mg total) by mouth once daily.    cholecalciferol, vitamin D3, 1,250 mcg (50,000 unit) capsule Take 1 capsule (50,000 Units total) by mouth every 7 days.    cholestyramine (QUESTRAN) 4 gram packet TAKE 1 PACKET BY MOUTH TWICE DAILY AS DIRECTED (Patient taking differently: Take 4 g by mouth 2 (two) times daily.)    clopidogreL (PLAVIX) 75 mg tablet Take 1 tablet (75 mg total) by mouth once daily.    cromolyn (NASALCROM) 5.2 mg/spray (4 %) nasal spray 1 spray by Nasal route 4 (four) times daily.    empagliflozin (JARDIANCE) 10 mg tablet Take 1 tablet (10 mg total) by mouth once daily.    EScitalopram oxalate (LEXAPRO) 20 MG tablet Take 1 tablet (20 mg total) by mouth once daily.    fluticasone propionate (FLONASE) 50 mcg/actuation nasal spray 2 sprays (100 mcg total) by Each Nostril route once daily.    fluticasone-umeclidin-vilanter (TRELEGY ELLIPTA) 200-62.5-25 mcg inhaler Inhale 1 puff into the lungs once daily.    furosemide (LASIX) 20 MG tablet Take 1 tablet (20 mg  total) by mouth daily as needed (for edema, swelling, fluid build up, or 3 pound weight gain in 24 hours).    gabapentin (NEURONTIN) 300 MG capsule Take 1 capsule (300 mg total) by mouth 3 (three) times daily.    hydrOXYzine HCL (ATARAX) 25 MG tablet Take 2 tablets (50 mg total) by mouth 2 (two) times daily as needed for Itching.    isosorbide mononitrate (IMDUR) 120 MG 24 hr tablet Take 1 tablet (120 mg total) by mouth every evening.    latanoprost 0.005 % ophthalmic solution Place 1 drop into both eyes every evening.    levETIRAcetam (KEPPRA) 1000 MG tablet Take 1 tablet (1,000 mg total) by mouth 2 (two) times daily.    linezolid (ZYVOX) 600 mg Tab Take 1 tablet (600 mg total) by mouth every 12 (twelve) hours.    magnesium oxide (MAG-OX) 400 mg (241.3 mg magnesium) tablet Take 1 tablet (400 mg total) by mouth once daily.    montelukast (SINGULAIR) 10 mg tablet Take 1 tablet (10 mg total) by mouth once daily.    pantoprazole (PROTONIX) 40 MG tablet TAKE 1 TABLET BY MOUTH TWICE DAILY (Patient taking differently: Take 40 mg by mouth 2 (two) times daily.)    potassium chloride SA (K-DUR,KLOR-CON) 20 MEQ tablet Take 1 tablet (20 mEq total) by mouth once daily. Take extra dose with Lasix - fluid pill    ranolazine (RANEXA) 1,000 mg Tb12 Take 1 tablet (1,000 mg total) by mouth 2 (two) times daily.    roflumilast (DALIRESP) 500 mcg Tab TAKE ONE TABLET BY MOUTH DAILY (Patient taking differently: Take 1 tablet by mouth once daily.)    sacubitriL-valsartan (ENTRESTO) 24-26 mg per tablet Take 1 tablet by mouth 2 (two) times daily.    sodium chloride 3% 3 % nebulizer solution Take 4 mLs by nebulization as needed for Cough (chest congestion).    spironolactone (ALDACTONE) 50 MG tablet Take 1 tablet (50 mg total) by mouth once daily.    tiZANidine (ZANAFLEX) 4 MG tablet TAKE 1 TABLET BY MOUTH EVERY TWELVE HOURS AS NEEDED (Patient taking differently: Take 4 mg by mouth every 12 (twelve) hours.)    blood sugar diagnostic Strp To  check BG 1 times daily, to use with insurance preferred meter    blood-glucose meter kit To check BG 1 times daily, to use with insurance preferred meter    denosumab (PROLIA) 60 mg/mL Syrg every 6 (six) months.    dicyclomine (BENTYL) 10 MG capsule Take 1 capsule (10 mg total) by mouth 2 (two) times daily.    dorzolamide-timolol 2-0.5% (COSOPT) 22.3-6.8 mg/mL ophthalmic solution Place 1 drop into both eyes every 12 (twelve) hours. (Patient not taking: Reported on 12/31/2024)    fluocinonide (LIDEX) 0.05 % external solution Apply topically 2 (two) times daily. Use on itchy spots on scalp and ear    ipratropium (ATROVENT) 42 mcg (0.06 %) nasal spray 2 sprays by Each Nostril route 2 (two) times daily.    ketoconazole (NIZORAL) 2 % cream Apply topically 2 (two) times daily. For dry flaky patches of ear.    lancets Misc To check BG 1 times daily, to use with insurance preferred meter    mucus clearing device (AEROBIKA OSCILLATING PEP SYSTM) by Misc.(Non-Drug; Combo Route) route 4 (four) times daily.    MYRBETRIQ 50 mg Tb24 TAKE 1 TABLET BY MOUTH ONCE DAILY (Patient not taking: Reported on 5/8/2024)    nitroGLYCERIN (NITROSTAT) 0.4 MG SL tablet Place 1 tablet (0.4 mg total) under the tongue every 5 (five) minutes as needed for Chest pain.    omeprazole (PRILOSEC) 40 MG capsule Take 1 capsule (40 mg total) by mouth every morning.    pramoxine-hydrocortisone (ANALPRAM HC) cream Apply topically 3 (three) times daily.    tezepelumab-ekko 210 mg/1.91 mL (110 mg/mL) PnIj Inject 210 mg into the skin every 28 days.    triamcinolone acetonide 0.025% (KENALOG) 0.025 % cream Apply topically 2 (two) times daily. PRN rash and itching of ear. Mild steroid cream.       Review of patient's allergies indicates:   Allergen Reactions    Penicillins Anaphylaxis, Hives, Shortness Of Breath and Swelling    Penicillin     Adhesive Rash    Doxycycline Nausea Only    Oxycodone Other (See Comments)     Fatigue      Sulfa (sulfonamide antibiotics)  Itching       Past Medical History:   Diagnosis Date    Abnormal ECG 08/05/2019    Acute bronchitis and bronchiolitis 12/31/2024    Aneurysm     Anticoagulant long-term use     Arthritis     CAD in native artery 08/05/2019    Diabetes mellitus     Fall 10/10/2019    Formatting of this note might be different from the original. Found sitting on floor next to bed last night Mild confusion today Does not recall falling or how she ended up on floor UA today Labs yesterday unremarkable    Glaucoma     Hypertension     Pulmonary fibrosis     Seizures     Shingles 05/27/2017    Stroke      Past Surgical History:   Procedure Laterality Date    BRAIN SURGERY      CARDIAC CATHETERIZATION      CATHETERIZATION OF BOTH LEFT AND RIGHT HEART N/A 5/17/2024    Procedure: CATHETERIZATION, HEART, BOTH LEFT AND RIGHT;  Surgeon: Rachelle Sarabia MD;  Location: [facility] CATH LAB;  Service: Cardiology;  Laterality: N/A;    COLONOSCOPY N/A 09/21/2023    Procedure: COLONOSCOPY;  Surgeon: Joanna Leal MD;  Location: [facility] ENDO;  Service: Endoscopy;  Laterality: N/A;    CORONARY ANGIOPLASTY      EPIDURAL STEROID INJECTION INTO CERVICAL SPINE N/A 2/7/2024    Procedure: Cervical IL XANDER, Level C6/7, RN IV sedation;  Surgeon: Francisco Oshea MD;  Location: [facility] PAIN MGT;  Service: Pain Management;  Laterality: N/A;    EPIDURAL STEROID INJECTION INTO LUMBAR SPINE Bilateral 11/12/2019    Procedure: TF XANDER L4/5;  Surgeon: Desean Dias MD;  Location: [facility] PAIN MGT;  Service: Pain Management;  Laterality: Bilateral;    HYSTERECTOMY      INJECTION OF ANESTHETIC AGENT AROUND MEDIAL BRANCH NERVES INNERVATING LUMBAR FACET JOINT Bilateral 01/31/2020    Procedure: Bilateral L3-5 MBB;  Surgeon: Desean Dias MD;  Location: [facility] PAIN MGT;  Service: Pain Management;  Laterality: Bilateral;    INJECTION OF ANESTHETIC AGENT INTO SACROILIAC JOINT Right 11/16/2021    Procedure: Right BLOCK, SACROILIAC JOINT Right GTB with RN IV sedation;  Surgeon:  Yao Fulton MD;  Location: [facility] PAIN MGT;  Service: Pain Management;  Laterality: Right;    INJECTION OF ANESTHETIC AGENT INTO SACROILIAC JOINT Bilateral 07/28/2023    Procedure: Bilateral GT bursa + bilateral SIJ injection;  Surgeon: Francisco Oshea MD;  Location: [facility] PAIN MGT;  Service: Pain Management;  Laterality: Bilateral;    INJECTION OF JOINT Bilateral 07/09/2020    Procedure: Bilateral shoulder GH Joint injection with local;  Surgeon: Desean Dias MD;  Location: [facility] PAIN MGT;  Service: Pain Management;  Laterality: Bilateral;    INJECTION OF JOINT Bilateral 07/28/2023    Procedure: Bilateral GT bursa + bilateral SIJ injection NEEDS ADDITIONAL SEDATION AND MORE TIME BEFORE INJECTION RN IV Sedation;  Surgeon: Francisco Oshea MD;  Location: [facility] PAIN MGT;  Service: Pain Management;  Laterality: Bilateral;    INJECTION OF JOINT N/A 10/25/2023    Procedure: Sacrococcygeal joint injection;  Surgeon: Francisco Oshea MD;  Location: [facility] PAIN MGT;  Service: Pain Management;  Laterality: N/A;    OOPHORECTOMY      SELECTIVE INJECTION OF ANESTHETIC AGENT AROUND LUMBAR SPINAL NERVE ROOT BY TRANSFORAMINAL APPROACH Bilateral 04/26/2023    Procedure: Bilateral L4/5 TF XANDER RN IV Sedation;  Surgeon: Francisco Oshea MD;  Location: [facility] PAIN MGT;  Service: Pain Management;  Laterality: Bilateral;    TRANSFORAMINAL EPIDURAL INJECTION OF STEROID Bilateral 07/23/2019    Procedure: Bilateral L3/4 Transforaminal Epidural Steroid Injection;  Surgeon: Desean Dias MD;  [facility];  Service: Pain Management;  Laterality: Bilateral;    TRANSFORAMINAL EPIDURAL INJECTION OF STEROID Bilateral 03/10/2020    Procedure: Right T12/L1 TF XANDER with local;  Surgeon: Desean Dias MD;  Location: [facility] PAIN MGT;  Service: Pain Management;  Laterality: Bilateral;    TRANSFORAMINAL EPIDURAL INJECTION OF STEROID Right 06/19/2020    Procedure: Right T12/L1 TFESI Covid day of procedure;  Surgeon: Desean Dias MD;  Location:  [facility] PAIN MGT;  Service: Pain Management;  Laterality: Right;     Family History       Problem Relation (Age of Onset)    Breast cancer Maternal Aunt, Maternal Aunt, Maternal Aunt    No Known Problems Mother, Father          Tobacco Use    Smoking status: Former     Current packs/day: 0.00     Average packs/day: 1 pack/day for 42.3 years (42.3 ttl pk-yrs)     Types: Cigarettes     Start date:      Quit date: 2004     Years since quittin.7    Smokeless tobacco: Former   Substance and Sexual Activity    Alcohol use: No    Drug use: Never    Sexual activity: Not Currently     Partners: Male     Review of Systems  Objective:     Vital Signs (Most Recent):  Temp: 97.2 °F (36.2 °C) (25 0449)  Pulse: 66 (25 0800)  Resp: 18 (25 0724)  BP: 123/69 (25 0721)  SpO2: 100 % (25 0724) Vital Signs (24h Range):  Temp:  [97.2 °F (36.2 °C)-97.7 °F (36.5 °C)] 97.2 °F (36.2 °C)  Pulse:  [54-88] 66  Resp:  [18-20] 18  SpO2:  [93 %-100 %] 100 %  BP: (123-159)/(58-69) 123/69     Weight: 54.2 kg (119 lb 7.8 oz)  Body mass index is 20.51 kg/m².        Physical Exam  Constitutional:       General: She is not in acute distress.     Appearance: She is well-developed. She is ill-appearing.   HENT:      Head: Normocephalic and atraumatic.      Right Ear: Tympanic membrane, ear canal and external ear normal.      Left Ear: Tympanic membrane, ear canal and external ear normal.      Nose: Septal deviation present. No nasal deformity, mucosal edema or rhinorrhea.      Left Nostril: Epistaxis present.      Right Sinus: No maxillary sinus tenderness or frontal sinus tenderness.      Left Sinus: No maxillary sinus tenderness or frontal sinus tenderness.        Comments: Nasal septal perforation with associated bleeding around the margins     Mouth/Throat:      Mouth: Mucous membranes are not pale and not dry.      Dentition: No dental caries.      Pharynx: Uvula midline. No oropharyngeal exudate or posterior  oropharyngeal erythema.   Eyes:      General: Lids are normal. No scleral icterus.     Extraocular Movements:      Right eye: Normal extraocular motion and no nystagmus.      Left eye: Normal extraocular motion and no nystagmus.      Conjunctiva/sclera: Conjunctivae normal.      Right eye: Right conjunctiva is not injected. No chemosis.     Left eye: Left conjunctiva is not injected. No chemosis.     Pupils: Pupils are equal, round, and reactive to light.   Neck:      Thyroid: No thyroid mass or thyromegaly.      Trachea: Trachea and phonation normal. No tracheal tenderness or tracheal deviation.   Pulmonary:      Effort: Pulmonary effort is normal. Tachypnea present. No respiratory distress.      Breath sounds: No stridor.   Abdominal:      General: There is no distension.   Lymphadenopathy:      Head:      Right side of head: No submental, submandibular, preauricular, posterior auricular or occipital adenopathy.      Left side of head: No submental, submandibular, preauricular, posterior auricular or occipital adenopathy.      Cervical: No cervical adenopathy.   Skin:     General: Skin is warm and dry.      Findings: No erythema or rash.   Neurological:      Mental Status: She is alert and oriented to person, place, and time.      Cranial Nerves: No cranial nerve deficit.   Psychiatric:         Behavior: Behavior normal.       PROCEDURE NOTE:  Control of Anterior Epistaxis, complex  Preprocedure diagnosis:  Recurrent epistaxis  Postprocedure diangosis:  Same  Complications:  None  Blood Loss:  Minimal    Procedure in detail:  After verbal consent was obtained, the patient's nasal cavity was anesthesized using topical Ponticaine.  Upon examination, the patient had a large anterior nasal septal perforation with evidence of recent bleeding and active bleeding along the margins of the perforation.  Under direct visualization with a head lamp and a nasal speculum, a silver nitrate stick was appiled to first their right  anterior and then his left anterior septum. Surgifoam packing was placed into the perforation to apply pressure. A minimal amount of bleeding was noted.  The patient tolerated the procedure well, and there were no significant complications.    Significant Labs:  CBC:   Recent Labs   Lab 01/19/25 0453   WBC 5.73   RBC 2.97*   HGB 9.2*   HCT 30.4*   *   *   MCH 31.0   MCHC 30.3*     CMP:   Recent Labs   Lab 01/18/25 0424 01/19/25 0453   *  120* 176*   CALCIUM 8.5*  8.4* 9.3   ALBUMIN 2.1*  --    PROT 5.7*  --    *  133* 133*   K 4.8  4.9 5.3*   CO2 22*  22* 20*     104 104   BUN 54*  54* 57*   CREATININE 0.9  0.9 1.2   ALKPHOS 44  --    ALT 23  --    AST 23  --    BILITOT 0.2  --      Coagulation:   Recent Labs   Lab 01/18/25 0424   LABPROT 10.9   INR 1.0   APTT 34.5*       Significant Diagnostics:  None    Assessment/Plan:     Active Diagnoses:    Diagnosis Date Noted POA    PRINCIPAL PROBLEM:  COVID-19 [U07.1] 02/21/2022 Yes    Nasal septal deviation [J34.2] 01/19/2025 Yes    Hemoptysis [R04.2] 01/18/2025 No    Hospital acquired PNA [J18.9, Y95] 01/17/2025 No    Encounter for palliative care [Z51.5] 01/13/2025 Not Applicable    Epistaxis [R04.0] 01/11/2025 No    Acute on chronic respiratory failure with hypoxia [J96.21] 01/09/2025 Yes    CKD (chronic kidney disease) [N18.9] 06/03/2023 Yes    Chronic systolic heart failure [I50.22] 03/15/2022 Yes    Mixed hyperlipidemia [E78.2] 08/19/2021 Yes    Seizure [R56.9] 12/23/2019 Yes    Normocytic anemia [D64.9] 12/23/2019 Yes    Fall [W19.XXXA] 10/10/2019 Yes    Coronary artery disease of native artery of native heart with stable angina pectoris [I25.118]  Yes    COPD exacerbation [J44.1] 08/04/2019 Yes    UIP (usual interstitial pneumonitis) [J84.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  Septal perforation and epistaxis noted on exam, cauterized.   Continue saline nasal spray, avoid nose blowing/straining  F/U with ENT after discharge in clinic  Reconsult PRN    Thank you for your consult. I will sign off. Please contact us if you have any additional questions.    Nik Lopez MD  Otorhinolaryngology-Head & Neck Surgery  [place] [facility] ([facility])

## 2025-01-19 NOTE — ASSESSMENT & PLAN NOTE
Likely secondary to supplemental O2 and full anticoagulation.  ENT packed the nose today and it seems to have been resolved.  I think this is the cause of hemoptysis.  Palm saw the patient today as well and she is on tranexamic acid nebs although we do not feel the bleeding is coming from the lungs.  CT with and without contrast overnight did not reveal a source of bleeding.  Hemoglobin has been dropping over the past few days.  We hope that the packing today we will resolve the bleeding and we will trend the hemoglobin

## 2025-01-19 NOTE — ASSESSMENT & PLAN NOTE
Was likely contributing to the epistaxis   Seen by ENT   Packing in place   No further bleeding  We will provide Mucomyst nasal spray to the septal defect especially on the right side where it and this especially dry and crusted to try to prevent further bleeding

## 2025-01-19 NOTE — SUBJECTIVE & OBJECTIVE
Review of Systems   Constitutional:  Negative for fatigue and fever.        Overall feels worse today, more tired   HENT: Negative.     Respiratory:  Positive for chest tightness, shortness of breath and wheezing.         Cough again worsening today   Cardiovascular:  Negative for chest pain and leg swelling.   Gastrointestinal:  Negative for abdominal pain, constipation, diarrhea and nausea.   Genitourinary:  Negative for difficulty urinating and dysuria.   Musculoskeletal: Negative.    Skin:  Negative for rash.   Neurological:  Negative for light-headedness and headaches.   Hematological:  Does not bruise/bleed easily.   Psychiatric/Behavioral:  Negative for agitation and behavioral problems.      Objective:     Vital Signs (Most Recent):  Temp: 97.7 °F (36.5 °C) (01/18/25 1202)  Pulse: 79 (01/18/25 1633)  Resp: 18 (01/18/25 1633)  BP: (!) 151/58 (01/18/25 1202)  SpO2: 100 % (01/18/25 1633) Vital Signs (24h Range):  Temp:  [46.6 °F (8.1 °C)-97.9 °F (36.6 °C)] 97.7 °F (36.5 °C)  Pulse:  [] 79  Resp:  [18-20] 18  SpO2:  [97 %-100 %] 100 %  BP: (109-151)/(53-63) 151/58     Weight: 53.5 kg (117 lb 15.1 oz)  Body mass index is 20.25 kg/m².  No intake or output data in the 24 hours ending 01/18/25 1825          Physical Exam  Constitutional:       General: She is not in acute distress.     Comments: Cachectic, ill appearing   HENT:      Head: Normocephalic and atraumatic.      Nose:      Comments: Epistaxis visible in both nostrils  There is a septal defect   Bleeding is around the posterior aspect of the defect   There is also bleeding down the posterior oropharynx  Resolved after packing by ENT     Mouth/Throat:      Mouth: Mucous membranes are moist.      Pharynx: Oropharynx is clear.   Eyes:      General: No scleral icterus.  Cardiovascular:      Rate and Rhythm: Normal rate and regular rhythm.      Heart sounds: No murmur heard.     No gallop.   Pulmonary:      Breath sounds: Wheezing present.       Comments: Inspiratory and expiratory wheezing throughout, significant crackles in all lung fields  Abdominal:      General: Bowel sounds are normal. There is no distension.      Tenderness: There is no abdominal tenderness.   Musculoskeletal:      Right lower leg: No edema.      Left lower leg: No edema.   Skin:     General: Skin is warm and dry.   Neurological:      Mental Status: She is alert and oriented to person, place, and time. Mental status is at baseline.   Psychiatric:         Mood and Affect: Mood normal.         Behavior: Behavior normal.             Significant Labs: All pertinent labs within the past 24 hours have been reviewed.    Significant Imaging: I have reviewed all pertinent imaging results/findings within the past 24 hours.

## 2025-01-19 NOTE — ASSESSMENT & PLAN NOTE
Fall night of 1/15 with head strike   Evaluated by nighttime hospitalist   Head CT was negative   Neuro exam on 01/16 is normal   Bed alarm is on   Working with physical therapy, she will be discharged to rehab

## 2025-01-19 NOTE — ASSESSMENT & PLAN NOTE
Creatine stable for now. BMP reviewed- noted Estimated Creatinine Clearance: 32 mL/min (based on SCr of 1.2 mg/dL). according to latest data. Based on current GFR, CKD stage is stage 4 - GFR 15-29.  Monitor UOP and serial BMP and adjust therapy as needed. Renally dose meds. Avoid nephrotoxic medications and procedures.  -Creatinine 1.5 on admission however 3 months ago was 1.2    Recent Labs     01/17/25  0518 01/18/25  0424 01/19/25  0453   CREATININE 1.2 0.9  0.9 1.2         -Due to GFR, will need to dc ranexa for now  -Holding spironolactone, entresto  -Monitor creatinine     Preventive Care Visit  [facility] NANCY Taylor CNP, Nurse Practitioner - Pediatrics  2024    Assessment & Plan   5 month old, here for preventive care.    Encounter for routine child health examination w/o abnormal findings  Weight is coming up nicely. Has a orthotic helmet today.    - Maternal Health Risk Assessment (87618) - EPDS    Growth      Normal OFC, length and weight    Immunizations   Appropriate vaccinations were ordered.    Anticipatory Guidance    Reviewed age appropriate anticipatory guidance.   Reviewed Anticipatory Guidance in patient instructions    Referrals/Ongoing Specialty Care  None  Verbal Dental Referral: No teeth yet  Dental Fluoride Varnish: No, no teeth yet.      Subjective   Jose is presenting for the following:  Well Child            2024    12:36 PM   Additional Questions   Accompanied by mom and sister   Questions for today's visit Yes   Questions spot on stomach   Surgery, major illness, or injury since last physical No       [place]  Depression Scale (EPDS) Risk Assessment: Completed [place]        2024   Social   Lives with Parent(s)   Who takes care of your child? Parent(s)   Recent potential stressors None   History of trauma No   Family Hx mental health challenges No   Lack of transportation has limited access to appts/meds No   Do you have housing?  Yes   Are you worried about losing your housing? No         2024     9:33 AM   Health Risks/Safety   What type of car seat does your child use?  Infant car seat   Is your child's car seat forward or rear facing? Rear facing   Where does your child sit in the car?  Back seat   Are stairs gated at home? (!) NO   Do you use space heaters, wood stove, or a fireplace in your home? No   Are poisons/cleaning supplies and medications kept out of reach? Yes   Do you have guns/firearms in the home? Decline to answer         2024     9:33 AM   TB Screening   Was your  child born outside of the [place]? No         1/23/2024     9:33 AM   TB Screening: Consider immunosuppression as a risk factor for TB   Recent TB infection or positive TB test in family/close contacts No   Recent travel outside [place] (child/family/close contacts) No   Recent residence in high-risk group setting (correctional facility/health care facility/homeless shelter/refugee camp) No          1/23/2024     9:33 AM   Dental Screening   Have parents/caregivers/siblings had cavities in the last 2 years? (!) YES, IN THE LAST 7-23 MONTHS- MODERATE RISK         1/23/2024   Diet   Do you have questions about feeding your baby? No   What does your baby eat? Breast milk    Formula   Formula type Up and up infant premium   How does your baby eat? Breastfeeding/Nursing    Bottle   Vitamin or supplement use None   In past 12 months, concerned food might run out No   In past 12 months, food has run out/couldn't afford more No         1/23/2024     9:33 AM   Elimination   Bowel or bladder concerns? No concerns         1/23/2024     9:33 AM   Media Use   Hours per day of screen time (for entertainment) 0         1/23/2024     9:33 AM   Sleep   Do you have any concerns about your child's sleep? No concerns, regular bedtime routine and sleeps well through the night   Where does your baby sleep? Crib   In what position does your baby sleep? (!) TUMMY         1/23/2024     9:33 AM   Vision/Hearing   Vision or hearing concerns No concerns         1/23/2024     9:33 AM   Development/ Social-Emotional Screen   Developmental concerns No   Does your child receive any special services? No     Development    Screening too used, reviewed with parent or guardian: No screening tool used  Milestones (by observation/ exam/ report) 75-90% ile  SOCIAL/EMOTIONAL:   Knows familiar people   Likes to look at self in mirror   Laughs  LANGUAGE/COMMUNICATION:   Takes turns making sounds with you   Blows raspberries (Sticks tongue out and blows)    "Makes squealing noises  COGNITIVE (LEARNING, THINKING, PROBLEM-SOLVING):   Puts things in their mouth to explore them   Reaches to grab a toy they want   Closes lips to show they don't want more food  MOVEMENT/PHYSICAL DEVELOPMENT:   Rolls from tummy to back   Pushes up with straight arms when on tummy   Leans on hands to support self when sitting         Objective     Exam  Pulse 148   Temp 98.9  F (37.2  C) (Temporal)   Resp 32   Ht 0.635 m (2' 1\")   Wt 6.932 kg (15 lb 4.5 oz)   HC 41.3 cm (16.26\")   SpO2 99%   BMI 17.19 kg/m    5 %ile (Z= -1.67) based on WHO (Boys, 0-2 years) head circumference-for-age based on Head Circumference recorded on 1/30/2024.  11 %ile (Z= -1.23) based on WHO (Boys, 0-2 years) weight-for-age data using vitals from 1/30/2024.  3 %ile (Z= -1.94) based on WHO (Boys, 0-2 years) Length-for-age data based on Length recorded on 1/30/2024.  52 %ile (Z= 0.05) based on WHO (Boys, 0-2 years) weight-for-recumbent length data based on body measurements available as of 1/30/2024.    Physical Exam  GENERAL: Active, alert, in no acute distress.  SKIN: Clear. No significant rash, abnormal pigmentation or lesions  HEAD: Normocephalic. Normal fontanels and sutures.  EYES: Conjunctivae and cornea normal. Red reflexes present bilaterally.  EARS: Normal canals. Tympanic membranes are normal; gray and translucent.  NOSE: Normal without discharge.  MOUTH/THROAT: Clear. No oral lesions.  NECK: Supple, no masses.  LYMPH NODES: No adenopathy  LUNGS: Clear. No rales, rhonchi, wheezing or retractions  HEART: Regular rhythm. Normal S1/S2. No murmurs. Normal femoral pulses.  ABDOMEN: Soft, non-tender, not distended, no masses or hepatosplenomegaly. Normal umbilicus and bowel sounds.   GENITALIA: Normal male external genitalia. Stanley stage I,  Testes descended bilaterally, no hernia or hydrocele.    EXTREMITIES: Hips normal with negative Ortolani and Berumen. Symmetric creases and  no deformities  NEUROLOGIC: " Normal tone throughout. Normal reflexes for age      Signed Electronically by: NANCY Chilel CNP

## 2025-01-19 NOTE — PT/OT/SLP PROGRESS
Physical Therapy      Patient Name:  Shireen Felix   MRN:  [id number]    Attempted to see pt at 0745 pt receiving breathing tx. Attempted to see pt again MD in room. Attempted to see pt again at 1012 pt soiled. Will continue efforts.    Lulu Claudio, PTA  1/19/25

## 2025-01-19 NOTE — ASSESSMENT & PLAN NOTE
Likely secondary to supplemental O2, septal defect.  ENT packed the nose 1/18/25 and it seems to have resolved.  I think this is the cause of hemoptysis.  Pulm saw the patient as well and she is on tranexamic acid nebs although we do not feel the bleeding is coming from the lungs.  CT with and without contrast did not reveal a source of bleeding.  Hemoglobin has been dropping over the past few days but is not increasing after addressing the epistaxis.    -Mucomyst nasal spray to keep the septum moist

## 2025-01-19 NOTE — SUBJECTIVE & OBJECTIVE
Review of Systems   Constitutional:  Negative for fatigue and fever.        Overall feels slightly better today but still sick   HENT:          Epistaxis resolved/improving   Respiratory:  Positive for cough, shortness of breath and wheezing. Negative for chest tightness.         Codeine cough syrup seemed to help some   Cardiovascular:  Negative for chest pain and leg swelling.   Gastrointestinal:  Negative for abdominal pain, constipation, diarrhea and nausea.   Genitourinary:  Negative for difficulty urinating and dysuria.   Musculoskeletal: Negative.    Skin:  Negative for rash.   Neurological:  Negative for light-headedness and headaches.   Hematological:  Does not bruise/bleed easily.   Psychiatric/Behavioral:  Negative for agitation and behavioral problems.      Objective:     Vital Signs (Most Recent):  Temp: 97.2 °F (36.2 °C) (01/19/25 0449)  Pulse: 66 (01/19/25 0800)  Resp: 18 (01/19/25 0902)  BP: 123/69 (01/19/25 0900)  SpO2: 100 % (01/19/25 0724) Vital Signs (24h Range):  Temp:  [97.2 °F (36.2 °C)-97.7 °F (36.5 °C)] 97.2 °F (36.2 °C)  Pulse:  [54-88] 66  Resp:  [18-20] 18  SpO2:  [93 %-100 %] 100 %  BP: (123-159)/(58-69) 123/69     Weight: 54.2 kg (119 lb 7.8 oz)  Body mass index is 20.51 kg/m².    Intake/Output Summary (Last 24 hours) at 1/19/2025 1140  Last data filed at 1/19/2025 0815  Gross per 24 hour   Intake 600 ml   Output --   Net 600 ml             Physical Exam  Constitutional:       General: She is not in acute distress.     Comments: Cachectic, ill appearing   HENT:      Head: Normocephalic and atraumatic.      Nose:      Comments: Septal defect present   Bleeding from anterior nose at the posterior aspect of the defect seen yesterday   Now there is packing by ENT, no further bleeding   There is a crusted lesion on the right side that looks dry     Mouth/Throat:      Mouth: Mucous membranes are moist.      Pharynx: Oropharynx is clear.   Eyes:      General: No scleral  icterus.  Cardiovascular:      Rate and Rhythm: Normal rate and regular rhythm.      Heart sounds: No murmur heard.     No gallop.   Pulmonary:      Breath sounds: Wheezing present.      Comments: Inspiratory and expiratory wheezing throughout, significant crackles in all lung fields  Abdominal:      General: Bowel sounds are normal. There is no distension.      Tenderness: There is no abdominal tenderness.   Musculoskeletal:      Right lower leg: No edema.      Left lower leg: No edema.   Skin:     General: Skin is warm and dry.   Neurological:      Mental Status: She is alert and oriented to person, place, and time. Mental status is at baseline.   Psychiatric:         Mood and Affect: Mood normal.         Behavior: Behavior normal.             Significant Labs: All pertinent labs within the past 24 hours have been reviewed.    Significant Imaging: I have reviewed all pertinent imaging results/findings within the past 24 hours.

## 2025-01-19 NOTE — ASSESSMENT & PLAN NOTE
Patient with known CAD  No anginal signs or symptoms   On Plavix and statin as an outpatient   Holding Plavix currently for the epistaxis   Can restart when we are confident the epistaxis has improved / is unlikely to restart

## 2025-01-19 NOTE — ASSESSMENT & PLAN NOTE
** initially COVID positive on 12/31 on admission **   Patient is identified as Severe COVID-19 based on hypoxemia with O2 saturations <94% on room air or on ambulation   Initiated standard COVID protocols; COVID-19 testing ,Infection Control notification  and isolation- respiratory, contact and droplet per protocol    Management: Treated with targetedtherapy with Remdesivir, 200mg IV x1, followed by 100mg IV daily x5 days total and Anticoagulation, Patient admitted to non-critical care unit- Will initiate full dose anticoagulation with Weight based lovenox 1mg/kg IV q12, stopped on 1/11/25, Maintain oxygen saturations 92-96% via Nasal Cannula  and monitor with continuous/intermittent pulse oximetry. , and Continuous cardiac monitoring.    As of 01/18/2025 I have ordered to DC precautions as it has been more than 10 days since onset, and her persistent symptoms are likely a flare of her interstitial lung disease/postviral pneumonia

## 2025-01-19 NOTE — ASSESSMENT & PLAN NOTE
Not clear if she truly has AFib, documented in the chart but can not find details   Note from Cardiology 9/24/24 with 18 day monitor and no atrial fibrillation observed  Currently she is in sinus rhythm  Patient documented as having atrial fibrillation and on long-term anticoagulation  Does not have any DOAC listed as a long-term med, do not see in her history  She does say though that she has been on a blood thinner, not sure which one  Regardless we are holding anticoagulation in the setting of epistaxis/hemoptysis   We will likely need to start either anticoagulation or at least DVT prophylaxis in the coming days when the epistaxis has improved

## 2025-01-19 NOTE — ASSESSMENT & PLAN NOTE
Patient is identified as Severe COVID-19 based on hypoxemia with O2 saturations <94% on room air or on ambulation   Initiate standard COVID protocols; COVID-19 testing ,Infection Control notification  and isolation- respiratory, contact and droplet per protocol    Diagnostics: CBC, CMP, Ferritin, CRP, Troponin, and Portable CXR    Management: Initiate targeted therapy with Remdesivir, 200mg IV x1, followed by 100mg IV daily x5 days total and Anticoagulation, Patient admitted to non-critical care unit- Will initiate full dose anticoagulation with Weight based lovenox 1mg/kg IV q12, stopped on 1/11/25, Maintain oxygen saturations 92-96% via Nasal Cannula  and monitor with continuous/intermittent pulse oximetry. , and Continuous cardiac monitoring.    As of 01/18/2025 I have ordered to live precautions as it has been more than 10 days since onset, and her persistent symptoms are likely a flare of her interstitial lung disease/postviral pneumonia

## 2025-01-19 NOTE — ASSESSMENT & PLAN NOTE
Likely secondary to the epistaxis.  CT with and without contrast negative.  On TXA nebs but if no further hemoptysis may be able to stop soon.  Likely exacerbated by the coughing so adding cough syrup with codeine today in addition to her other antitussives. Improved significantly after addressing the epistaxis 01/18/2025

## 2025-01-19 NOTE — ASSESSMENT & PLAN NOTE
Feel this is most likely epistaxis that is draining posteriorly and then she coughs it up  CTA chest is fairly unremarkable.    Placed on Zabrina-mask to keep the cannula out of her nose.    Afrin.    Will trial some TXA nebs for good measure, but I don't feel this is likely from a pulmonary source.    Codeine cough syrup to help with her persistent cough.    1/19 - now s/p cautery by ENT with seeming resolution of symptoms

## 2025-01-19 NOTE — PROGRESS NOTES
[facility] Medicine  Progress Note    Patient Name: Shireen Felix  MRN: [id number]  Patient Class: IP- Inpatient   Admission Date: 12/31/2024  Length of Stay: 17 days  Attending Physician: Kristopher Wynn MD  Primary Care Provider: Laron Chaudhari MD        Subjective     Principal Problem:COVID-19        HPI:  Shireen Felix is a 79 year old female who has  has a past medical history of Abnormal ECG, Acute bronchitis and bronchiolitis, Aneurysm, Anticoagulant long-term use, Arthritis, CAD in native artery, COPD (chronic obstructive pulmonary disease), Diabetes mellitus, Fall, Glaucoma, Hypertension, Seizures, Shingles, and Stroke who presented to Emergency Department for evaluation for cough. Associated symptoms include: congestion, wheezing, and SOB. She was seen by her pulmonologist today, Dr. Sam. Started as sinus pain and drainage and now has progressed to respiratory symptoms. She was seen a few days ago in the ER given prescription for antibiotics steroids but these have not helped much. Today in the office she can barely talk without coughing continually. She is having a difficult time putting more than 3 words together without severe cough paroxysms. ED workup showed Hgb/Hct 11.9/37.0, K+ 3.1, CO2 16, BUN/creatinine 34/1.5. CT chest w/out contrast stable suspected pulmonary fibrosis of the lungs. No acute infiltrates. She has received DuoNeb x 1, solumedrol 125 mg IV x 1, KCL 40 mEq PO x 1 and NS 1L bolus. Pt admitted for COVID.     Overview/Hospital Course:  Admitted to Hospital Medicine for acute on chronic respiratory failure concerns in setting of COVID-19.  Started on IV methylprednisolone, remdesivir, scheduled respiratory treatments. 01/04: Reported left lower quadrant abdominal pain with tenderness to palpation, will evaluate via CT as patient is on therapeutic anticoagulation for COVID-19.  CT abdomen/pelvis nonacute. 01/05:  Reported improvements in  respiratory status with decreased frequency of cough and dyspnea but still reporting significant symptoms from baseline.  On 1/6, patient had a significant event with acute tachypnea, tachycardia. Pulmonology consulted, orders adjusted. Patient stable overnight, had another event on 1/7 similar to previous day. Possible exacerbation related to anxiety provoked by medications and congestion. Case reviewed, orders adjusted. Per Pulmonology, due to underlying lung fibrosis, patient is likely not benefiting from bronchodilators. Stopped on 1/7, patient did not experience any further episodes of anxiety, respiratory distress. Will continue xanax and prn morphine. Patient with epistaxis, small clots in tissue noted. Reduced supplemental O2 to 1L at rest, stopped full dose lovenox and placed on ppx dose. Hgb stable.   Difficulty with discharge plan, patient originally planned to d/c to SNF, now prefers to d/c home.  Pulmonology discussed home palliative care vs hospice.  Patient and family wish to discuss the Services further.  Per discussion with patient, family will be available for an informational visit on Monday.  Educated patient on results of oxygen evaluation, advised patient to reduce oxygen to 2 L or less at rest, requires 4 L with exertion.  As clinically indicated if significant improvement noted will repeat oxygen evaluation.     01/12/2025  O2 sats stable on 2L (while at rest). Patient and family wants to try and go home, but unable to participate with PT over weekend. Repeat desat study on Monday. May have to consider palliative measures if progress plateaus.      01/13/2025  Feeling slightly better today than she felt a few days ago but overall still significantly short of breath with exertion.  Discussed with her, palliative Care, and her family.  She does not want to change to hospice at this time.  She wants to try to get better.  She does want to be DNR though.  Appreciate further palliative care  involvement.  Still desaturating significantly with exertion and unable to work with PT because of this    01/14/2025  Feeling perhaps slightly better today, attempting to work with physical therapy.  We are hoping that we could either get her to rehab if her oxygen requirements with exertion go down, or potentially get a bigger concentrator at home for increasing her up to 10 L with exertion at home.  Otherwise no changes to the plan    01/15/2025:  Still having coughing spells but notably improved.  Oxygen is being down titrated and she even got a room air today.  We are screening her for rehabs    01/16/2025:  Coughing she states is much worse when oxygen is off.  She was on room air but I put her on 1 L to see if there is truly an effect.  She is still being screened for rehabs.  Overall she looks much better.  She did technically have a fever today to 101.4 but when I evaluate her she looks notably better, CXR is negative for acute change, would not recommend starting antibiotics and she has defervesced    1/17/25: Somnolent this AM, spiking more fevers yesterday evening, and mildly soft BP this AM. Lactate wnl. Alert and oriented on my eval. CXR with ? Infiltrate, more cough, feeling worse. Will start ABX for PNA, blood cultures, sputum culture    01/18/2025: Significant epistaxis and hemoptysis overnight.  CT with and without contrast did not reveal a source of bleed.  Still coughing with hemoptysis this morning.  On exam with obvious epistaxis, packed by ENT, resolved.  Coughing has improved.  Currently on Ventimask to avoid nasal cannula         Review of Systems   Constitutional:  Negative for fatigue and fever.        Overall feels worse today, more tired   HENT: Negative.     Respiratory:  Positive for chest tightness, shortness of breath and wheezing.         Cough again worsening today   Cardiovascular:  Negative for chest pain and leg swelling.   Gastrointestinal:  Negative for abdominal pain,  constipation, diarrhea and nausea.   Genitourinary:  Negative for difficulty urinating and dysuria.   Musculoskeletal: Negative.    Skin:  Negative for rash.   Neurological:  Negative for light-headedness and headaches.   Hematological:  Does not bruise/bleed easily.   Psychiatric/Behavioral:  Negative for agitation and behavioral problems.      Objective:     Vital Signs (Most Recent):  Temp: 97.7 °F (36.5 °C) (01/18/25 1202)  Pulse: 79 (01/18/25 1633)  Resp: 18 (01/18/25 1633)  BP: (!) 151/58 (01/18/25 1202)  SpO2: 100 % (01/18/25 1633) Vital Signs (24h Range):  Temp:  [46.6 °F (8.1 °C)-97.9 °F (36.6 °C)] 97.7 °F (36.5 °C)  Pulse:  [] 79  Resp:  [18-20] 18  SpO2:  [97 %-100 %] 100 %  BP: (109-151)/(53-63) 151/58     Weight: 53.5 kg (117 lb 15.1 oz)  Body mass index is 20.25 kg/m².  No intake or output data in the 24 hours ending 01/18/25 1825          Physical Exam  Constitutional:       General: She is not in acute distress.     Comments: Cachectic, ill appearing   HENT:      Head: Normocephalic and atraumatic.      Nose:      Comments: Epistaxis visible in both nostrils  There is a septal defect   Bleeding is around the posterior aspect of the defect   There is also bleeding down the posterior oropharynx  Resolved after packing by ENT     Mouth/Throat:      Mouth: Mucous membranes are moist.      Pharynx: Oropharynx is clear.   Eyes:      General: No scleral icterus.  Cardiovascular:      Rate and Rhythm: Normal rate and regular rhythm.      Heart sounds: No murmur heard.     No gallop.   Pulmonary:      Breath sounds: Wheezing present.      Comments: Inspiratory and expiratory wheezing throughout, significant crackles in all lung fields  Abdominal:      General: Bowel sounds are normal. There is no distension.      Tenderness: There is no abdominal tenderness.   Musculoskeletal:      Right lower leg: No edema.      Left lower leg: No edema.   Skin:     General: Skin is warm and dry.   Neurological:       Mental Status: She is alert and oriented to person, place, and time. Mental status is at baseline.   Psychiatric:         Mood and Affect: Mood normal.         Behavior: Behavior normal.             Significant Labs: All pertinent labs within the past 24 hours have been reviewed.    Significant Imaging: I have reviewed all pertinent imaging results/findings within the past 24 hours.    Assessment and Plan     * COVID-19  Patient is identified as Severe COVID-19 based on hypoxemia with O2 saturations <94% on room air or on ambulation   Initiate standard COVID protocols; COVID-19 testing ,Infection Control notification  and isolation- respiratory, contact and droplet per protocol    Diagnostics: CBC, CMP, Ferritin, CRP, Troponin, and Portable CXR    Management: Initiate targeted therapy with Remdesivir, 200mg IV x1, followed by 100mg IV daily x5 days total and Anticoagulation, Patient admitted to non-critical care unit- Will initiate full dose anticoagulation with Weight based lovenox 1mg/kg IV q12, stopped on 1/11/25, Maintain oxygen saturations 92-96% via Nasal Cannula  and monitor with continuous/intermittent pulse oximetry. , and Continuous cardiac monitoring.    As of 01/18/2025 I have ordered to live precautions as it has been more than 10 days since onset, and her persistent symptoms are likely a flare of her interstitial lung disease/postviral pneumonia     Hemoptysis  Likely secondary to the epistaxis.  CT with and without contrast negative.  On TXA nebs.  Likely exacerbated by the coughing so adding cough syrup with codeine today in addition to her other antitussives      Hospital acquired PNA  Patient more lethargic, with worsening cough and shortness of breath.  CXR with possible left-sided infiltrate.  I suspect postviral pneumonia, hospital-acquired.  Blood and sputum cultures pending.  Lactate is normal.  Has a penicillin allergy.  Started on cefepime.  Providing supplemental oxygen and we will monitor  closely    Encounter for palliative care  Palliative Care is following.  Receiving hydrocodone/acetaminophen p.r.n., morphine p.r.n., alprazolam p.r.n. for respiratory distress with good effect      Epistaxis  Likely secondary to supplemental O2 and full anticoagulation.  ENT packed the nose today and it seems to have been resolved.  I think this is the cause of hemoptysis.  Palm saw the patient today as well and she is on tranexamic acid nebs although we do not feel the bleeding is coming from the lungs.  CT with and without contrast overnight did not reveal a source of bleeding.  Hemoglobin has been dropping over the past few days.  We hope that the packing today we will resolve the bleeding and we will trend the hemoglobin      Acute on chronic respiratory failure with hypoxia  Patient with Hypoxic Respiratory failure which is Acute on chronic.  she is not on home oxygen. Supplemental oxygen was provided and noted-      .   Signs/symptoms of respiratory failure include- tachypnea, increased work of breathing, respiratory distress, use of accessory muscles, wheezing, stridor, and lethargy. Contributing diagnoses includes - Interstitial lung disease Labs and images were reviewed. Patient Has recent ABG, which has been reviewed. Will treat underlying causes and adjust management of respiratory failure as follows-     Home Oxygen evaluation performed:  Home Oxygen Evaluation - [facility] - Cardiopulmonary Department      Date Performed: 1/8/2025     1)Patient's Home O2 Sat on room air, while at rest: Room Air SpO2 At Rest: 99 %                             If O2 sats on room air at rest are 88% or below, patient qualifies.  Document O2 liter flow needed in Step 2.  If O2 sats are 89% or above, complete Step 3.        2)         If patient is not ambulated and O2 sats are <88%, what is the O2 liter flow required to meet ordered saturation? Home O2 Eval Comments: pt qualifies for home oxygen     If O2 sats on  "room air while exercising remain 89% or above patient does not qualify, no further testing needed Document N/A in step 3. If O2 sats on room air while exercising are 88% or below, continue to Step 4.     3)Patient's O2 Sat on room air while exercising: Room Air SpO2 During Ambulation: (!) 78 %                             4)Patient's O2 Sat while exercising on O2: SpO2 During Ambulation on O2: 96 % at Ambulation O2 LPM: 4 LPM                             (Must show improvement from #4 for patients to qualify)    Patient qualifies for Home O2 @4L NC due to exertional dyspnea, hypoxia    Hypokalemia  Patient's most recent potassium results are listed below.   Recent Labs     01/08/25  0604 01/09/25  0508 01/10/25  0531   K 4.3 4.9 4.6       Plan  - Replete potassium per protocol  - Monitor potassium Daily  - Patient's hypokalemia is  being treated. Received KCL 40 mEq PO x 1 in ED  -  Stable    CKD (chronic kidney disease)  Creatine stable for now. BMP reviewed- noted Estimated Creatinine Clearance: 31.6 mL/min (based on SCr of 1.2 mg/dL). according to latest data. Based on current GFR, CKD stage is stage 4 - GFR 15-29.  Monitor UOP and serial BMP and adjust therapy as needed. Renally dose meds. Avoid nephrotoxic medications and procedures.  -Creatinine 1.5 on admission however 3 months ago was 1.2    Recent Labs     01/15/25  0457 01/16/25  0526 01/17/25  0518   CREATININE 1.2 1.2 1.2         -Due to GFR, will need to dc ranexa for now and decrease dose of gabapentin  -Held spironolactone now restarting 1/16/25 (although will need to hold again for hypotension this AM)  -Continue Entresto   -Monitor creatinine  -stable    Chronic systolic heart failure  Patient has Diastolic (HFpEF) heart failure that is Chronic. On presentation their CHF was well compensated. Most recent BNP and echo results are listed below.  No results for input(s): "BNP" in the last 72 hours.    Latest ECHO  Results for orders placed during the " hospital encounter of 06/02/23    Echo    Interpretation Summary  · The left ventricle is normal in size with concentric hypertrophy and low normal systolic function.  · The estimated ejection fraction is 50%.  · Normal left ventricular diastolic function.  · There is abnormal septal wall motion consistent with left bundle branch block.  · Normal right ventricular size with normal right ventricular systolic function.  · Normal central venous pressure (3 mmHg).  · The estimated PA systolic pressure is 30 mmHg.  · Mild mitral regurgitation.    Current Heart Failure Medications  furosemide tablet 20 mg, Daily, Oral    Plan  - Monitor strict I&Os and daily weights.    - Place on telemetry  - Low sodium diet  - Place on fluid restriction of 1.5 L.   - Cardiology has not been consulted  - The patient's volume status is at their baseline  - Repeat ECHO, EF improved  - stop IV Lasix, resume home dose p.o. Lasix  -we are holding spironolactone, Entresto, metoprolol in the setting of sepsis and hypotension, can reintroduce them as she improves           Mixed hyperlipidemia  Lab Results   Component Value Date    CHOL 112 (L) 07/12/2024    HDL 41 07/12/2024    LDLCALC 49.8 (L) 07/12/2024    TRIG 106 07/12/2024     -Continue Statin    Fall  Full night of 1/15 with head strike   Evaluated by nighttime hospitalist   Head CT was negative   Neuro exam on 01/16 is normal   Bed alarm is on   Working with physical therapy, she will be discharged to rehab      Normocytic anemia  Anemia is likely due to chronic disease due to Chronic Kidney Disease. Most recent hemoglobin and hematocrit are listed below.  Recent Labs     01/15/25  0457 01/16/25  0526 01/17/25  0518   HGB 10.7* 9.8* 9.0*   HCT 34.7* 31.7* 29.6*       Plan  - Monitor serial CBC: Daily  - Transfuse PRBC if patient becomes hemodynamically unstable, symptomatic or H/H drops below 7/21.  - Patient has not received any PRBC transfusions to date  - Patient's anemia is currently  stable    Seizure  Hx of seizures, patient has not had a seizure during this admission.   At home is on seizure medication Aptiom 400mg daily and Keppra 1,000mg BID  Aptiom likely worsening hyponatremia and also inhibiting SNF placement due to cost, so discussed with neuro on call  Per neuro on call, would DC Aptiom and increase Keppra to 1,500mg BID, change made  Can follow up with neuro as outpatient to revisit      Coronary artery disease of native artery of native heart with stable angina pectoris  Patient with known CAD, which is controlled Will continue Plavix and Statin and monitor for S/Sx of angina/ACS. Continue to monitor on telemetry.     COPD exacerbation  Unclear if truly exacerbated versus wheezing/shortness of breath from pneumonia or mild fluid overload.  Providing budesonide/formoterol inhalers, ipratropium as needed, and brief prednisone course    UIP (usual interstitial pneumonitis)  Pulm fibrosis hx- followed by pulmonology outpatient  -Will need close pulm f/u after discharge  -we have been discussing possible transitioned to hospice, but now she is improving and plan at this time is to get better, improve, and likely we will discharge her to SNF when medically ready        GERD (gastroesophageal reflux disease)  -Continue PPI        VTE Risk Mitigation (From admission, onward)      None            Discharge Planning   KATHERINE:      Code Status: DNR   Medical Readiness for Discharge Date:   Discharge Plan A: Skilled Nursing Facility   Discharge Delays: None known at this time            Please place Justification for DME        Kristopher Wynn MD  Department of Hospital Medicine   [facility] - Telemetry ([facility])

## 2025-01-19 NOTE — PROGRESS NOTES
[facility] Medicine  Progress Note    Patient Name: Shireen Felix  MRN: [id number]  Patient Class: IP- Inpatient   Admission Date: 12/31/2024  Length of Stay: 18 days  Attending Physician: Kristopher Wynn MD  Primary Care Provider: Laron Chaudhari MD        Subjective     Principal Problem:UIP (usual interstitial pneumonitis)        HPI:  Shireen Felix is a 79 year old female who has  has a past medical history of Abnormal ECG, Acute bronchitis and bronchiolitis, Aneurysm, Anticoagulant long-term use, Arthritis, CAD in native artery, COPD (chronic obstructive pulmonary disease), Diabetes mellitus, Fall, Glaucoma, Hypertension, Seizures, Shingles, and Stroke who presented to Emergency Department for evaluation for cough. Associated symptoms include: congestion, wheezing, and SOB. She was seen by her pulmonologist today, Dr. Sam. Started as sinus pain and drainage and now has progressed to respiratory symptoms. She was seen a few days ago in the ER given prescription for antibiotics steroids but these have not helped much. Today in the office she can barely talk without coughing continually. She is having a difficult time putting more than 3 words together without severe cough paroxysms. ED workup showed Hgb/Hct 11.9/37.0, K+ 3.1, CO2 16, BUN/creatinine 34/1.5. CT chest w/out contrast stable suspected pulmonary fibrosis of the lungs. No acute infiltrates. She has received DuoNeb x 1, solumedrol 125 mg IV x 1, KCL 40 mEq PO x 1 and NS 1L bolus. Pt admitted for COVID.     Overview/Hospital Course:  Admitted to Hospital Medicine for acute on chronic respiratory failure concerns in setting of COVID-19.  Started on IV methylprednisolone, remdesivir, scheduled respiratory treatments. 01/04: Reported left lower quadrant abdominal pain with tenderness to palpation, will evaluate via CT as patient is on therapeutic anticoagulation for COVID-19.  CT abdomen/pelvis nonacute.  01/05:  Reported improvements in respiratory status with decreased frequency of cough and dyspnea but still reporting significant symptoms from baseline.  On 1/6, patient had a significant event with acute tachypnea, tachycardia. Pulmonology consulted, orders adjusted. Patient stable overnight, had another event on 1/7 similar to previous day. Possible exacerbation related to anxiety provoked by medications and congestion. Case reviewed, orders adjusted. Per Pulmonology, due to underlying lung fibrosis, patient is likely not benefiting from bronchodilators. Stopped on 1/7, patient did not experience any further episodes of anxiety, respiratory distress. Will continue xanax and prn morphine. Patient with epistaxis, small clots in tissue noted. Reduced supplemental O2 to 1L at rest, stopped full dose lovenox and placed on ppx dose. Hgb stable.   Difficulty with discharge plan, patient originally planned to d/c to SNF, now prefers to d/c home.  Pulmonology discussed home palliative care vs hospice.  Patient and family wish to discuss the Services further.  Per discussion with patient, family will be available for an informational visit on Monday.  Educated patient on results of oxygen evaluation, advised patient to reduce oxygen to 2 L or less at rest, requires 4 L with exertion.  As clinically indicated if significant improvement noted will repeat oxygen evaluation.     01/12/2025  O2 sats stable on 2L (while at rest). Patient and family wants to try and go home, but unable to participate with PT over weekend. Repeat desat study on Monday. May have to consider palliative measures if progress plateaus.      01/13/2025  Feeling slightly better today than she felt a few days ago but overall still significantly short of breath with exertion.  Discussed with her, palliative Care, and her family.  She does not want to change to hospice at this time.  She wants to try to get better.  She does want to be DNR though.   Appreciate further palliative care involvement.  Still desaturating significantly with exertion and unable to work with PT because of this    01/14/2025  Feeling perhaps slightly better today, attempting to work with physical therapy.  We are hoping that we could either get her to rehab if her oxygen requirements with exertion go down, or potentially get a bigger concentrator at home for increasing her up to 10 L with exertion at home.  Otherwise no changes to the plan    01/15/2025:  Still having coughing spells but notably improved.  Oxygen is being down titrated and she even got a room air today.  We are screening her for rehabs    01/16/2025:  Coughing she states is much worse when oxygen is off.  She was on room air but I put her on 1 L to see if there is truly an effect.  She is still being screened for rehabs.  Overall she looks much better.  She did technically have a fever today to 101.4 but when I evaluate her she looks notably better, CXR is negative for acute change, would not recommend starting antibiotics and she has defervesced    1/17/25: Somnolent this AM, spiking more fevers yesterday evening, and mildly soft BP this AM. Lactate wnl. Alert and oriented on my eval. CXR with ? Infiltrate, more cough, feeling worse. Will start ABX for PNA, blood cultures, sputum culture    01/18/2025: Significant epistaxis and hemoptysis overnight.  CT with and without contrast did not reveal a source of bleed.  Still coughing with hemoptysis this morning.  On exam with obvious epistaxis, packed by ENT, resolved.  Coughing has improved.  Currently on Ventimask to avoid nasal cannula     01/19/2025: Doing better today, no more hemoptysis, less cough, no more epistaxis, hemoglobin up slightly.  Still on Ventimask to avoid nasal cannula but only requiring minimal quantities of oxygen, can take it off to eat.  Listed precautions yesterday evening for COVID given has been more than 10 days since diagnosis        Review of  Systems   Constitutional:  Negative for fatigue and fever.        Overall feels slightly better today but still sick   HENT:          Epistaxis resolved/improving   Respiratory:  Positive for cough, shortness of breath and wheezing. Negative for chest tightness.         Codeine cough syrup seemed to help some   Cardiovascular:  Negative for chest pain and leg swelling.   Gastrointestinal:  Negative for abdominal pain, constipation, diarrhea and nausea.   Genitourinary:  Negative for difficulty urinating and dysuria.   Musculoskeletal: Negative.    Skin:  Negative for rash.   Neurological:  Negative for light-headedness and headaches.   Hematological:  Does not bruise/bleed easily.   Psychiatric/Behavioral:  Negative for agitation and behavioral problems.      Objective:     Vital Signs (Most Recent):  Temp: 97.2 °F (36.2 °C) (01/19/25 0449)  Pulse: 66 (01/19/25 0800)  Resp: 18 (01/19/25 0902)  BP: 123/69 (01/19/25 0900)  SpO2: 100 % (01/19/25 0724) Vital Signs (24h Range):  Temp:  [97.2 °F (36.2 °C)-97.7 °F (36.5 °C)] 97.2 °F (36.2 °C)  Pulse:  [54-88] 66  Resp:  [18-20] 18  SpO2:  [93 %-100 %] 100 %  BP: (123-159)/(58-69) 123/69     Weight: 54.2 kg (119 lb 7.8 oz)  Body mass index is 20.51 kg/m².    Intake/Output Summary (Last 24 hours) at 1/19/2025 1140  Last data filed at 1/19/2025 0815  Gross per 24 hour   Intake 600 ml   Output --   Net 600 ml             Physical Exam  Constitutional:       General: She is not in acute distress.     Comments: Cachectic, ill appearing   HENT:      Head: Normocephalic and atraumatic.      Nose:      Comments: Septal defect present   Bleeding from anterior nose at the posterior aspect of the defect seen yesterday   Now there is packing by ENT, no further bleeding   There is a crusted lesion on the right side that looks dry     Mouth/Throat:      Mouth: Mucous membranes are moist.      Pharynx: Oropharynx is clear.   Eyes:      General: No scleral icterus.  Cardiovascular:       Rate and Rhythm: Normal rate and regular rhythm.      Heart sounds: No murmur heard.     No gallop.   Pulmonary:      Breath sounds: Wheezing present.      Comments: Inspiratory and expiratory wheezing throughout, significant crackles in all lung fields  Abdominal:      General: Bowel sounds are normal. There is no distension.      Tenderness: There is no abdominal tenderness.   Musculoskeletal:      Right lower leg: No edema.      Left lower leg: No edema.   Skin:     General: Skin is warm and dry.   Neurological:      Mental Status: She is alert and oriented to person, place, and time. Mental status is at baseline.   Psychiatric:         Mood and Affect: Mood normal.         Behavior: Behavior normal.             Significant Labs: All pertinent labs within the past 24 hours have been reviewed.    Significant Imaging: I have reviewed all pertinent imaging results/findings within the past 24 hours.    Assessment and Plan     * UIP (usual interstitial pneumonitis)  Pulm fibrosis hx- followed by pulmonology outpatient  Likely had flare of pulmonary fibrosis secondary to the COVID infection  This likely explains her continued symptoms, hypoxia, cough, etc.   We had been initially discussing whether patient would need to transitioned to hospice as she was requiring up to 10 L  Oxygen requirement has improved significantly, now on Ventimask only to avoid nasal cannula, but truly requires only about 1 L at this time   Still having very significant cough which we are working to control  Plan to discharge to rehab when medically stable        Atrial fibrillation  Not clear if she truly has AFib, documented in the chart but can not find details   Note from Cardiology 9/24/24 with 18 day monitor and no atrial fibrillation observed  Currently she is in sinus rhythm  Patient documented as having atrial fibrillation and on long-term anticoagulation  Does not have any DOAC listed as a long-term med, do not see in her history  She  does say though that she has been on a blood thinner, not sure which one  Regardless we are holding anticoagulation in the setting of epistaxis/hemoptysis   We will likely need to start either anticoagulation or at least DVT prophylaxis in the coming days when the epistaxis has improved          Nasal septal deviation        Hemoptysis  Likely secondary to the epistaxis.  CT with and without contrast negative.  On TXA nebs but if no further hemoptysis may be able to stop soon.  Likely exacerbated by the coughing so adding cough syrup with codeine today in addition to her other antitussives. Improved significantly after addressing the epistaxis 01/18/2025      Hospital acquired PNA  1/17/25: Patient more lethargic, with worsening cough and shortness of breath.  CXR with possible left-sided infiltrate.  I suspect postviral pneumonia, hospital-acquired.  Blood and sputum cultures pending.  Lactate is normal.  Has a penicillin allergy.  Started on cefepime.  Providing supplemental oxygen and we will monitor closely. Would plan to complete a 7 day course, or so    Encounter for palliative care  Palliative Care is following.  Receiving hydrocodone/acetaminophen p.r.n., morphine p.r.n., alprazolam p.r.n. for respiratory distress with good effect      Epistaxis  Likely secondary to supplemental O2, septal defect.  ENT packed the nose 1/18/25 and it seems to have resolved.  I think this is the cause of hemoptysis.  Pulm saw the patient as well and she is on tranexamic acid nebs although we do not feel the bleeding is coming from the lungs.  CT with and without contrast did not reveal a source of bleeding.  Hemoglobin has been dropping over the past few days but is not increasing after addressing the epistaxis.    -Mucomyst nasal spray to keep the septum moist    Acute on chronic respiratory failure with hypoxia  Patient with Hypoxic Respiratory failure which is Acute on chronic.  she is not on home oxygen. Supplemental  oxygen was provided and noted- Oxygen Concentration (%):  [28] 28    .   Signs/symptoms of respiratory failure include- tachypnea, increased work of breathing, respiratory distress, use of accessory muscles, wheezing, stridor, and lethargy. Contributing diagnoses includes - Interstitial lung disease Labs and images were reviewed. Patient Has recent ABG, which has been reviewed. Will treat underlying causes and adjust management of respiratory failure as follows-     Home Oxygen evaluation performed:  Home Oxygen Evaluation - [facility] - Cardiopulmonary Department      Date Performed: 1/8/2025     1)Patient's Home O2 Sat on room air, while at rest: Room Air SpO2 At Rest: 99 %                             If O2 sats on room air at rest are 88% or below, patient qualifies.  Document O2 liter flow needed in Step 2.  If O2 sats are 89% or above, complete Step 3.        2)         If patient is not ambulated and O2 sats are <88%, what is the O2 liter flow required to meet ordered saturation? Home O2 Eval Comments: pt qualifies for home oxygen     If O2 sats on room air while exercising remain 89% or above patient does not qualify, no further testing needed Document N/A in step 3. If O2 sats on room air while exercising are 88% or below, continue to Step 4.     3)Patient's O2 Sat on room air while exercising: Room Air SpO2 During Ambulation: (!) 78 %                             4)Patient's O2 Sat while exercising on O2: SpO2 During Ambulation on O2: 96 % at Ambulation O2 LPM: 4 LPM                             (Must show improvement from #4 for patients to qualify)    Patient qualifies for Home O2 @4L NC due to exertional dyspnea, hypoxia    Hypokalemia  Patient's most recent potassium results are listed below.   Recent Labs     01/08/25  0604 01/09/25  0508 01/10/25  0531   K 4.3 4.9 4.6       Plan  - Replete potassium per protocol  - Monitor potassium Daily  - Patient's hypokalemia is  being treated. Received KCL  "40 mEq PO x 1 in ED  -  Stable    CKD (chronic kidney disease)  Creatine stable for now. BMP reviewed- noted Estimated Creatinine Clearance: 32 mL/min (based on SCr of 1.2 mg/dL). according to latest data. Based on current GFR, CKD stage is stage 4 - GFR 15-29.  Monitor UOP and serial BMP and adjust therapy as needed. Renally dose meds. Avoid nephrotoxic medications and procedures.  -Creatinine 1.5 on admission however 3 months ago was 1.2    Recent Labs     01/17/25  0518 01/18/25  0424 01/19/25  0453   CREATININE 1.2 0.9  0.9 1.2         -Due to GFR, will need to dc ranexa for now  -Holding spironolactone, entresto  -Monitor creatinine      Chronic systolic heart failure  Patient has Diastolic (HFpEF) heart failure that is Chronic. On presentation their CHF was well compensated. Most recent BNP and echo results are listed below.  No results for input(s): "BNP" in the last 72 hours.    Latest ECHO  Results for orders placed during the hospital encounter of 06/02/23    Echo    Interpretation Summary  · The left ventricle is normal in size with concentric hypertrophy and low normal systolic function.  · The estimated ejection fraction is 50%.  · Normal left ventricular diastolic function.  · There is abnormal septal wall motion consistent with left bundle branch block.  · Normal right ventricular size with normal right ventricular systolic function.  · Normal central venous pressure (3 mmHg).  · The estimated PA systolic pressure is 30 mmHg.  · Mild mitral regurgitation.    Current Heart Failure Medications  furosemide tablet 20 mg, Daily, Oral    Plan  - Monitor strict I&Os and daily weights.    - Place on telemetry  - Low sodium diet  - Place on fluid restriction of 1.5 L.   - Cardiology has not been consulted  - The patient's volume status is at their baseline  - Repeat ECHO, EF improved  - stop IV Lasix, resume home dose p.o. Lasix  -we are holding spironolactone, Entresto, metoprolol in the setting of sepsis " and hypotension, can reintroduce them as she improves           COVID-19  ** initially COVID positive on 12/31 on admission **   Patient is identified as Severe COVID-19 based on hypoxemia with O2 saturations <94% on room air or on ambulation   Initiated standard COVID protocols; COVID-19 testing ,Infection Control notification  and isolation- respiratory, contact and droplet per protocol    Management: Treated with targetedtherapy with Remdesivir, 200mg IV x1, followed by 100mg IV daily x5 days total and Anticoagulation, Patient admitted to non-critical care unit- Will initiate full dose anticoagulation with Weight based lovenox 1mg/kg IV q12, stopped on 1/11/25, Maintain oxygen saturations 92-96% via Nasal Cannula  and monitor with continuous/intermittent pulse oximetry. , and Continuous cardiac monitoring.    As of 01/18/2025 I have ordered to DC precautions as it has been more than 10 days since onset, and her persistent symptoms are likely a flare of her interstitial lung disease/postviral pneumonia     Mixed hyperlipidemia  Lab Results   Component Value Date    CHOL 112 (L) 07/12/2024    HDL 41 07/12/2024    LDLCALC 49.8 (L) 07/12/2024    TRIG 106 07/12/2024     -Continue Statin    Fall  Fall night of 1/15 with head strike   Evaluated by nighttime hospitalist   Head CT was negative   Neuro exam on 01/16 is normal   Bed alarm is on   Working with physical therapy, she will be discharged to rehab      Normocytic anemia  Anemia is likely due to chronic disease due to Chronic Kidney Disease. Most recent hemoglobin and hematocrit are listed below.  Recent Labs     01/17/25  0518 01/18/25  0424 01/19/25  0453   HGB 9.0* 8.6*  8.5* 9.2*   HCT 29.6* 27.4*  27.2* 30.4*       Plan  - Monitor serial CBC: Daily  - Transfuse PRBC if patient becomes hemodynamically unstable, symptomatic or H/H drops below 7/21.  - Patient has not received any PRBC transfusions to date  - Patient's anemia is currently stable  - the  epistaxis ongoing for a few days did lower the hemoglobin by a significant quantity, now that the epistaxis has been addressed the hemoglobin came up slightly from yesterday    Seizure  Hx of seizures, patient has not had a seizure during this admission.   At home is on seizure medication Aptiom 400mg daily and Keppra 1,000mg BID  Aptiom likely worsening hyponatremia and also inhibiting SNF placement due to cost, so discussed with neuro on call  Per neuro on call, would DC Aptiom and increase Keppra to 1,500mg BID, change made  Can follow up with neuro as outpatient to revisit  There remains mild hyponatremia      Coronary artery disease of native artery of native heart with stable angina pectoris  Patient with known CAD  No anginal signs or symptoms   On Plavix and statin as an outpatient   Holding Plavix currently for the epistaxis   Can restart when we are confident the epistaxis has improved / is unlikely to restart    COPD exacerbation  Unclear if truly exacerbated versus wheezing/shortness of breath from pneumonia or mild fluid overload.  Providing budesonide/formoterol inhalers, ipratropium as needed, and brief prednisone course    GERD (gastroesophageal reflux disease)  -Continue PPI      Nasal septal perforation  Was likely contributing to the epistaxis   Seen by ENT   Packing in place   No further bleeding  We will provide Mucomyst nasal spray to the septal defect especially on the right side where it and this especially dry and crusted to try to prevent further bleeding        VTE Risk Mitigation (From admission, onward)      None            Discharge Planning   KATHERINE:      Code Status: DNR   Medical Readiness for Discharge Date:   Discharge Plan A: Skilled Nursing Facility   Discharge Delays: None known at this time            Kristopher Wynn MD  Department of Hospital Medicine   [facility] - Telemetry ([facility])

## 2025-01-19 NOTE — SUBJECTIVE & OBJECTIVE
Interval History: No acute events.  Hemoptysis/epistaxis improved.  ENT cauterized septal perforation.  Cough improved though persistent.      Objective:     Vital Signs (Most Recent):  Temp: 97.5 °F (36.4 °C) (01/19/25 1145)  Pulse: 69 (01/19/25 1145)  Resp: 17 (01/19/25 1145)  BP: (!) 122/58 (01/19/25 1145)  SpO2: 100 % (01/19/25 1145) Vital Signs (24h Range):  Temp:  [97.2 °F (36.2 °C)-97.5 °F (36.4 °C)] 97.5 °F (36.4 °C)  Pulse:  [54-88] 69  Resp:  [17-20] 17  SpO2:  [93 %-100 %] 100 %  BP: (122-159)/(58-69) 122/58     Weight: 54.2 kg (119 lb 7.8 oz)  Body mass index is 20.51 kg/m².      Intake/Output Summary (Last 24 hours) at 1/19/2025 1305  Last data filed at 1/19/2025 0815  Gross per 24 hour   Intake 360 ml   Output --   Net 360 ml        Physical Exam  Vitals and nursing note reviewed.   Constitutional:       General: She is not in acute distress.     Appearance: She is ill-appearing.   Cardiovascular:      Rate and Rhythm: Normal rate and regular rhythm.      Pulses: Normal pulses.      Heart sounds: No murmur heard.  Pulmonary:      Effort: Pulmonary effort is normal. No respiratory distress.      Breath sounds: Rales present. No wheezing or rhonchi.   Abdominal:      General: Abdomen is flat. There is no distension.      Palpations: Abdomen is soft.      Tenderness: There is no abdominal tenderness.   Skin:     General: Skin is warm and dry.      Coloration: Skin is not jaundiced or pale.   Neurological:      General: No focal deficit present.      Mental Status: She is alert and oriented to person, place, and time. Mental status is at baseline.           Review of Systems    Vents:  Oxygen Concentration (%): 28 (01/19/25 0724)    Lines/Drains/Airways       Peripheral Intravenous Line  Duration                  Midline Catheter - Single Lumen 01/17/25 1925 Left basilic vein (medial side of arm) other (see comments) 1 day                    Significant Labs:    CBC/Anemia Profile:  Recent Labs   Lab  01/18/25 0424 01/19/25  0453   WBC 3.98  4.08 5.73   HGB 8.6*  8.5* 9.2*   HCT 27.4*  27.2* 30.4*   *  114* 147*   *  99* 102*   RDW 14.0  14.2 14.3        Chemistries:  Recent Labs   Lab 01/18/25 0424 01/18/25  0425 01/19/25  0453   *  133*  --  133*   K 4.8  4.9  --  5.3*     104  --  104   CO2 22*  22*  --  20*   BUN 54*  54*  --  57*   CREATININE 0.9  0.9  --  1.2   CALCIUM 8.5*  8.4*  --  9.3   ALBUMIN 2.1*  --   --    PROT 5.7*  --   --    BILITOT 0.2  --   --    ALKPHOS 44  --   --    ALT 23  --   --    AST 23  --   --    MG 2.5 2.5 2.4   PHOS  --  3.3 4.6*       All pertinent labs within the past 24 hours have been reviewed.    Significant Imaging:  I have reviewed all pertinent imaging results/findings within the past 24 hours.

## 2025-01-20 PROBLEM — E87.5 HYPERKALEMIA: Status: ACTIVE | Noted: 2025-01-20

## 2025-01-20 LAB
ANION GAP SERPL CALC-SCNC: 11 MMOL/L (ref 8–16)
BASOPHILS # BLD AUTO: 0 K/UL (ref 0–0.2)
BASOPHILS NFR BLD: 0 % (ref 0–1.9)
BUN SERPL-MCNC: 60 MG/DL (ref 8–23)
CALCIUM SERPL-MCNC: 8.2 MG/DL (ref 8.7–10.5)
CHLORIDE SERPL-SCNC: 103 MMOL/L (ref 95–110)
CO2 SERPL-SCNC: 14 MMOL/L (ref 23–29)
CREAT SERPL-MCNC: 1.3 MG/DL (ref 0.5–1.4)
DIFFERENTIAL METHOD BLD: ABNORMAL
EOSINOPHIL # BLD AUTO: 0 K/UL (ref 0–0.5)
EOSINOPHIL NFR BLD: 0.5 % (ref 0–8)
ERYTHROCYTE [DISTWIDTH] IN BLOOD BY AUTOMATED COUNT: 14.5 % (ref 11.5–14.5)
EST. GFR  (NO RACE VARIABLE): 42 ML/MIN/1.73 M^2
GLUCOSE SERPL-MCNC: 84 MG/DL (ref 70–110)
HCT VFR BLD AUTO: 27.4 % (ref 37–48.5)
HGB BLD-MCNC: 8.1 G/DL (ref 12–16)
IMM GRANULOCYTES # BLD AUTO: 0.07 K/UL (ref 0–0.04)
IMM GRANULOCYTES NFR BLD AUTO: 1.1 % (ref 0–0.5)
LYMPHOCYTES # BLD AUTO: 1 K/UL (ref 1–4.8)
LYMPHOCYTES NFR BLD: 15.8 % (ref 18–48)
MAGNESIUM SERPL-MCNC: 1.9 MG/DL (ref 1.6–2.6)
MCH RBC QN AUTO: 29.9 PG (ref 27–31)
MCHC RBC AUTO-ENTMCNC: 29.6 G/DL (ref 32–36)
MCV RBC AUTO: 101 FL (ref 82–98)
MONOCYTES # BLD AUTO: 0.5 K/UL (ref 0.3–1)
MONOCYTES NFR BLD: 7.8 % (ref 4–15)
NEUTROPHILS # BLD AUTO: 4.9 K/UL (ref 1.8–7.7)
NEUTROPHILS NFR BLD: 74.8 % (ref 38–73)
NRBC BLD-RTO: 0 /100 WBC
PHOSPHATE SERPL-MCNC: 3.8 MG/DL (ref 2.7–4.5)
PLATELET # BLD AUTO: 140 K/UL (ref 150–450)
PMV BLD AUTO: 10.2 FL (ref 9.2–12.9)
POTASSIUM SERPL-SCNC: 6 MMOL/L (ref 3.5–5.1)
RBC # BLD AUTO: 2.71 M/UL (ref 4–5.4)
SODIUM SERPL-SCNC: 128 MMOL/L (ref 136–145)
WBC # BLD AUTO: 6.51 K/UL (ref 3.9–12.7)

## 2025-01-20 PROCEDURE — 84100 ASSAY OF PHOSPHORUS: CPT | Performed by: STUDENT IN AN ORGANIZED HEALTH CARE EDUCATION/TRAINING PROGRAM

## 2025-01-20 PROCEDURE — 27000221 HC OXYGEN, UP TO 24 HOURS

## 2025-01-20 PROCEDURE — 25000003 PHARM REV CODE 250: Performed by: INTERNAL MEDICINE

## 2025-01-20 PROCEDURE — 97535 SELF CARE MNGMENT TRAINING: CPT

## 2025-01-20 PROCEDURE — 94640 AIRWAY INHALATION TREATMENT: CPT

## 2025-01-20 PROCEDURE — 25000003 PHARM REV CODE 250: Performed by: STUDENT IN AN ORGANIZED HEALTH CARE EDUCATION/TRAINING PROGRAM

## 2025-01-20 PROCEDURE — 97530 THERAPEUTIC ACTIVITIES: CPT | Mod: CQ

## 2025-01-20 PROCEDURE — 80048 BASIC METABOLIC PNL TOTAL CA: CPT | Performed by: STUDENT IN AN ORGANIZED HEALTH CARE EDUCATION/TRAINING PROGRAM

## 2025-01-20 PROCEDURE — 85025 COMPLETE CBC W/AUTO DIFF WBC: CPT | Performed by: STUDENT IN AN ORGANIZED HEALTH CARE EDUCATION/TRAINING PROGRAM

## 2025-01-20 PROCEDURE — 36415 COLL VENOUS BLD VENIPUNCTURE: CPT | Performed by: STUDENT IN AN ORGANIZED HEALTH CARE EDUCATION/TRAINING PROGRAM

## 2025-01-20 PROCEDURE — 63600175 PHARM REV CODE 636 W HCPCS: Performed by: NURSE PRACTITIONER

## 2025-01-20 PROCEDURE — 94761 N-INVAS EAR/PLS OXIMETRY MLT: CPT

## 2025-01-20 PROCEDURE — 21400001 HC TELEMETRY ROOM

## 2025-01-20 PROCEDURE — 63600175 PHARM REV CODE 636 W HCPCS: Performed by: STUDENT IN AN ORGANIZED HEALTH CARE EDUCATION/TRAINING PROGRAM

## 2025-01-20 PROCEDURE — 83735 ASSAY OF MAGNESIUM: CPT | Performed by: STUDENT IN AN ORGANIZED HEALTH CARE EDUCATION/TRAINING PROGRAM

## 2025-01-20 PROCEDURE — 99900035 HC TECH TIME PER 15 MIN (STAT)

## 2025-01-20 PROCEDURE — 25000003 PHARM REV CODE 250: Performed by: NURSE PRACTITIONER

## 2025-01-20 PROCEDURE — 25000242 PHARM REV CODE 250 ALT 637 W/ HCPCS: Performed by: STUDENT IN AN ORGANIZED HEALTH CARE EDUCATION/TRAINING PROGRAM

## 2025-01-20 RX ORDER — ACETAMINOPHEN 325 MG/1
650 TABLET ORAL EVERY 6 HOURS PRN
Status: DISCONTINUED | OUTPATIENT
Start: 2025-01-20 | End: 2025-01-28 | Stop reason: HOSPADM

## 2025-01-20 RX ORDER — SODIUM BICARBONATE 650 MG/1
1300 TABLET ORAL ONCE
Status: COMPLETED | OUTPATIENT
Start: 2025-01-20 | End: 2025-01-20

## 2025-01-20 RX ADMIN — CEFEPIME 2 G: 2 INJECTION, POWDER, FOR SOLUTION INTRAVENOUS at 11:01

## 2025-01-20 RX ADMIN — SODIUM ZIRCONIUM CYCLOSILICATE 10 G: 5 POWDER, FOR SUSPENSION ORAL at 08:01

## 2025-01-20 RX ADMIN — ARFORMOTEROL TARTRATE 15 MCG: 15 SOLUTION RESPIRATORY (INHALATION) at 07:01

## 2025-01-20 RX ADMIN — ACETAMINOPHEN 650 MG: 325 TABLET ORAL at 10:01

## 2025-01-20 RX ADMIN — LEVETIRACETAM 1500 MG: 500 TABLET, FILM COATED ORAL at 08:01

## 2025-01-20 RX ADMIN — IPRATROPIUM BROMIDE 0.5 MG: 0.5 SOLUTION RESPIRATORY (INHALATION) at 07:01

## 2025-01-20 RX ADMIN — BUDESONIDE INHALATION 0.5 MG: 0.5 SUSPENSION RESPIRATORY (INHALATION) at 07:01

## 2025-01-20 RX ADMIN — ESCITALOPRAM OXALATE 20 MG: 10 TABLET, FILM COATED ORAL at 08:01

## 2025-01-20 RX ADMIN — ATORVASTATIN CALCIUM 40 MG: 40 TABLET, FILM COATED ORAL at 08:01

## 2025-01-20 RX ADMIN — GABAPENTIN 300 MG: 300 CAPSULE ORAL at 02:01

## 2025-01-20 RX ADMIN — MORPHINE SULFATE 2 MG: 2 INJECTION, SOLUTION INTRAMUSCULAR; INTRAVENOUS at 08:01

## 2025-01-20 RX ADMIN — BENZONATATE 200 MG: 100 CAPSULE ORAL at 08:01

## 2025-01-20 RX ADMIN — OXYCODONE HYDROCHLORIDE AND ACETAMINOPHEN 500 MG: 500 TABLET ORAL at 08:01

## 2025-01-20 RX ADMIN — GUAIFENESIN 1200 MG: 600 TABLET, EXTENDED RELEASE ORAL at 08:01

## 2025-01-20 RX ADMIN — MONTELUKAST 10 MG: 10 TABLET, FILM COATED ORAL at 08:01

## 2025-01-20 RX ADMIN — ALPRAZOLAM 0.25 MG: 0.25 TABLET ORAL at 09:01

## 2025-01-20 RX ADMIN — LIDOCAINE HYDROCHLORIDE 10 ML: 20 SOLUTION ORAL; TOPICAL at 02:01

## 2025-01-20 RX ADMIN — SALINE NASAL SPRAY 1 SPRAY: 1.5 SOLUTION NASAL at 03:01

## 2025-01-20 RX ADMIN — SALINE NASAL SPRAY 1 SPRAY: 1.5 SOLUTION NASAL at 09:01

## 2025-01-20 RX ADMIN — BENZONATATE 200 MG: 100 CAPSULE ORAL at 02:01

## 2025-01-20 RX ADMIN — METHOCARBAMOL 500 MG: 500 TABLET ORAL at 02:01

## 2025-01-20 RX ADMIN — SALINE NASAL SPRAY 1 SPRAY: 1.5 SOLUTION NASAL at 08:01

## 2025-01-20 RX ADMIN — PROMETHAZINE HYDROCHLORIDE AND CODEINE PHOSPHATE 5 ML: 6.25; 1 SOLUTION ORAL at 02:01

## 2025-01-20 RX ADMIN — PROMETHAZINE HYDROCHLORIDE AND CODEINE PHOSPHATE 5 ML: 6.25; 1 SOLUTION ORAL at 08:01

## 2025-01-20 RX ADMIN — FUROSEMIDE 20 MG: 20 TABLET ORAL at 08:01

## 2025-01-20 RX ADMIN — GABAPENTIN 300 MG: 300 CAPSULE ORAL at 08:01

## 2025-01-20 RX ADMIN — MORPHINE SULFATE 2 MG: 2 INJECTION, SOLUTION INTRAMUSCULAR; INTRAVENOUS at 12:01

## 2025-01-20 RX ADMIN — PANTOPRAZOLE SODIUM 40 MG: 40 TABLET, DELAYED RELEASE ORAL at 08:01

## 2025-01-20 RX ADMIN — SODIUM ZIRCONIUM CYCLOSILICATE 10 G: 5 POWDER, FOR SUSPENSION ORAL at 03:01

## 2025-01-20 RX ADMIN — BENZONATATE 200 MG: 100 CAPSULE ORAL at 07:01

## 2025-01-20 RX ADMIN — THERA TABS 1 TABLET: TAB at 08:01

## 2025-01-20 RX ADMIN — PROMETHAZINE HYDROCHLORIDE AND CODEINE PHOSPHATE 5 ML: 6.25; 1 SOLUTION ORAL at 07:01

## 2025-01-20 RX ADMIN — POLYETHYLENE GLYCOL 3350 17 G: 17 POWDER, FOR SOLUTION ORAL at 08:01

## 2025-01-20 RX ADMIN — Medication 6 MG: at 02:01

## 2025-01-20 RX ADMIN — PREDNISONE 40 MG: 20 TABLET ORAL at 08:01

## 2025-01-20 RX ADMIN — SODIUM BICARBONATE 650 MG TABLET 1300 MG: at 02:01

## 2025-01-20 RX ADMIN — ALPRAZOLAM 0.25 MG: 0.25 TABLET ORAL at 08:01

## 2025-01-20 RX ADMIN — ALPRAZOLAM 0.25 MG: 0.25 TABLET ORAL at 03:01

## 2025-01-20 NOTE — PLAN OF CARE
A223/A223 OLU Felix is a 79 y.o.female admitted on 12/31/2024 for UIP (usual interstitial pneumonitis)   Code Status: DNR MRN: [id number]   Review of patient's allergies indicates:   Allergen Reactions    Penicillins Anaphylaxis, Hives, Shortness Of Breath and Swelling    Penicillin     Adhesive Rash    Doxycycline Nausea Only    Oxycodone Other (See Comments)     Fatigue      Sulfa (sulfonamide antibiotics) Itching     Past Medical History:   Diagnosis Date    Abnormal ECG 08/05/2019    Acute bronchitis and bronchiolitis 12/31/2024    Aneurysm     Anticoagulant long-term use     Arthritis     CAD in native artery 08/05/2019    Diabetes mellitus     Fall 10/10/2019    Formatting of this note might be different from the original. Found sitting on floor next to bed last night Mild confusion today Does not recall falling or how she ended up on floor UA today Labs yesterday unremarkable    Glaucoma     Hypertension     Pulmonary fibrosis     Seizures     Shingles 05/27/2017    Stroke       PRN meds    ALPRAZolam, 0.25 mg, TID PRN  dextrose 50%, 12.5 g, PRN  dextrose 50%, 25 g, PRN  glucagon (human recombinant), 1 mg, PRN  glucose, 16 g, PRN  glucose, 24 g, PRN  ipratropium, 0.5 mg, Q4H PRN  magic mouthwash (diphenhydrAMINE 12.5 mg/5 mL 20 mL, aluminum & magnesium hydroxide-simethicone (MYLANTA) 20 mL, LIDOcaine HCl 2% (XYLOCAINE) 20 mL) solution, 10 mL, Q4H PRN  melatonin, 6 mg, Nightly PRN  methocarbamoL, 500 mg, TID PRN  morphine, 2 mg, Q4H PRN  ondansetron, 4 mg, Q8H PRN  promethazine-codeine 6.25-10 mg/5 ml, 5 mL, Q4H PRN  sodium chloride 0.9%, 10 mL, PRN      Chart check completed. Will continue plan of care.      Orientation: oriented x 4  [place] Coma Scale Score: 15     Lead Monitored: Lead II Rhythm: normal sinus rhythm Frequency/Ectopy: PVCs  Cardiac/Telemetry Box Number: [id number]  VTE Core Measure: Pharmacological prophylaxis initiated/maintained Last Bowel Movement: 01/17/25  Diet Cardiac Low  Potassium; Isolation Tray - Styrofoam  Voiding Characteristics: incontinence  Gilberto Score: 15  Fall Risk Score: 19  Accucheck []   Freq?      Lines/Drains/Airways       Peripheral Intravenous Line  Duration                  Midline Catheter - Single Lumen 01/17/25 1925 Left basilic vein (medial side of arm) other (see comments) 2 days

## 2025-01-20 NOTE — ASSESSMENT & PLAN NOTE
Hyperkalemia is likely due to  metabolic acidosis .The patients most recent potassium results are listed below.  Recent Labs     01/19/25  0453 01/19/25  1353 01/20/25  0645   K 5.3* 5.2* 6.0*     Plan  - Monitor for arrhythmias with EKG and/or continuous telemetry.   - Treat the hyperkalemia with Potassium Binders, Furosemide, and Sodium Bicarbonate.   - Monitor potassium: Daily  - The patient's hyperkalemia is worsening. Will continue current treatment

## 2025-01-20 NOTE — SUBJECTIVE & OBJECTIVE
Interval History: f/u respiratory failure  plan skilled placement. Added acetaminophen for pain. Likely musculoskeletal    Review of Systems  Objective:     Vital Signs (Most Recent):  Temp: 97.4 °F (36.3 °C) (01/20/25 1314)  Pulse: 86 (01/20/25 1314)  Resp: 18 (01/20/25 1314)  BP: (!) 121/58 (01/20/25 1314)  SpO2: 100 % (01/20/25 1314) Vital Signs (24h Range):  Temp:  [97 °F (36.1 °C)-98.6 °F (37 °C)] 97.4 °F (36.3 °C)  Pulse:  [68-89] 86  Resp:  [15-20] 18  SpO2:  [90 %-100 %] 100 %  BP: (110-146)/(56-65) 121/58     Weight: 54.2 kg (119 lb 7.8 oz)  Body mass index is 20.51 kg/m².    Intake/Output Summary (Last 24 hours) at 1/20/2025 1339  Last data filed at 1/20/2025 1005  Gross per 24 hour   Intake 716 ml   Output --   Net 716 ml         Physical Exam  Constitutional:       Appearance: She is ill-appearing.   HENT:      Head: Normocephalic and atraumatic.   Cardiovascular:      Rate and Rhythm: Normal rate and regular rhythm.      Heart sounds: No murmur heard.  Pulmonary:      Effort: Pulmonary effort is normal. No respiratory distress.      Breath sounds: Normal breath sounds. No wheezing.   Abdominal:      General: Bowel sounds are normal. There is no distension.      Palpations: Abdomen is soft.      Tenderness: There is no abdominal tenderness.   Musculoskeletal:         General: No swelling.   Skin:     General: Skin is warm and dry.   Neurological:      Mental Status: She is alert and oriented to person, place, and time. Mental status is at baseline.      Motor: Weakness present.             Significant Labs: All pertinent labs within the past 24 hours have been reviewed.  Recent Lab Results         01/20/25  0645   01/19/25  1353        Anion Gap 11   12       Baso # 0.00         Basophil % 0.0         BUN 60   59       Calcium 8.2   9.3       Chloride 103   105       CO2 14   17       Creatinine 1.3   1.2       Differential Method Automated         eGFR 42   46       Eos # 0.0         Eos % 0.5          Glucose 84   85       Gran # (ANC) 4.9         Gran % 74.8         Hematocrit 27.4         Hemoglobin 8.1         Immature Grans (Abs) 0.07  Comment: Mild elevation in immature granulocytes is non specific and   can be seen in a variety of conditions including stress response,   acute inflammation, trauma and pregnancy. Correlation with other   laboratory and clinical findings is essential.           Immature Granulocytes 1.1         Lymph # 1.0         Lymph % 15.8         Magnesium  1.9         MCH 29.9         MCHC 29.6                  Mono # 0.5         Mono % 7.8         MPV 10.2         nRBC 0         Phosphorus Level 3.8         Platelet Count 140         Potassium 6.0   5.2       RBC 2.71         RDW 14.5         Sodium 128   134       WBC 6.51                 Significant Imaging: I have reviewed all pertinent imaging results/findings within the past 24 hours.

## 2025-01-20 NOTE — ASSESSMENT & PLAN NOTE
Fall night of 1/15 with head strike   Evaluated by nighttime hospitalist   Head CT was negative   Neuro exam on 01/16 is normal   Fall precautions  Continue therapy

## 2025-01-20 NOTE — ASSESSMENT & PLAN NOTE
Creatine stable for now. BMP reviewed- noted Estimated Creatinine Clearance: 29.5 mL/min (based on SCr of 1.3 mg/dL). according to latest data. Based on current GFR, CKD stage is stage 4 - GFR 15-29.  Monitor UOP and serial BMP and adjust therapy as needed. Renally dose meds. Avoid nephrotoxic medications and procedures.  -Creatinine 1.5 on admission however 3 months ago was 1.2    Recent Labs     01/19/25  0453 01/19/25  1353 01/20/25  0645   CREATININE 1.2 1.2 1.3         -Due to GFR, will need to dc ranexa for now  -Holding spironolactone, entresto  -Monitor creatinine

## 2025-01-20 NOTE — ASSESSMENT & PLAN NOTE
** initially COVID positive on 12/31 on admission **   Patient is identified as Severe COVID-19 based on hypoxemia with O2 saturations <94% on room air or on ambulation   Initiated standard COVID protocols; COVID-19 testing ,Infection Control notification  and isolation- respiratory, contact and droplet per protocol    Management: Treated with targetedtherapy with Remdesivir, 200mg IV x1, followed by 100mg IV daily x5 days total and Anticoagulation, Patient admitted to non-critical care unit- Will initiate full dose anticoagulation with Weight based lovenox 1mg/kg IV q12, stopped on 1/11/25, Maintain oxygen saturations 92-96% via Nasal Cannula  and monitor with continuous/intermittent pulse oximetry. , and Continuous cardiac monitoring.    As of 01/18/2025 DC'd  precautions as it has been more than 10 days since onset, and her persistent symptoms are likely a flare of her interstitial lung disease/postviral pneumonia

## 2025-01-20 NOTE — ASSESSMENT & PLAN NOTE
1/17/25: Patient more lethargic, with worsening cough and shortness of breath.  CXR with possible left-sided infiltrate.  I suspect postviral pneumonia, hospital-acquired.  Blood and sputum cultures pending.  Lactate is normal.  Has a penicillin allergy.  Started on cefepime.  Providing supplemental oxygen and we will monitor closely.  plan to complete a 7 day course

## 2025-01-20 NOTE — PT/OT/SLP PROGRESS
Physical Therapy  Treatment    Shireen Felix   MRN: [id number]   Admitting Diagnosis: UIP (usual interstitial pneumonitis)    PT Received On: 01/20/25  PT Start Time: 1145     PT Stop Time: 1210    PT Total Time (min): 25 min       Billable Minutes:  Therapeutic Activity 25    Treatment Type: Treatment  PT/PTA: PTA     Number of PTA visits since last PT visit: 2       General Precautions: Standard, fall  Orthopedic Precautions: N/A  Braces: N/A  Respiratory Status:  6L VIA VENTIMASK    Spiritual, Cultural Beliefs, Advent Practices, Values that Affect Care: no    Subjective:  Communicated with patient's nurse, Samia, and completed Epic chart review prior to session.  Patient agreed to PT session.    Pain/Comfort  Pain Rating 1: 0/10    Objective:   Patient found with: telemetry, bed alarm, Other (comments) (MIDLINE; AVASYS)    Supine > sit EOB: CGA    Forward scoot towards EOB: CGA    STS from EOB > RW x2 attempts   1st attempt unsuccessful   2nd attempt Mod A (VC for hand placement)    Stand pivot T/F from EOB > BSC w/ RW (90 degree turn): Min A     STS from BSC > RW: Min A     Stand pivot T/F from BSC > chair w/ RW (180 degree turn): Min A     Reviewed AROM TE to BLE including: hip flex/ext, knee flex/ext, ankle PF/DF  To be completed a minimum of 10 reps for each LE in order to promote return of function, strength and ROM.     Educated patient on importance of increased tolerance to upright position and direct impact on CV endurance and strength. Patient encouraged to sit up in chair/ EOB, for a minimum of 2 consecutive hours, 3x per day. Encouraged patient to perform AROM TE to BLE throughout the day within all available planes of motion. Re enforced importance of utilizing call light to meet needs in room and not attempt to get up without staff assistance. Patient verbalized understanding and agreed to comply.       AM-PAC 6 CLICK MOBILITY  How much help from another person does this patient currently  need?   1 = Unable, Total/Dependent Assistance  2 = A lot, Maximum/Moderate Assistance  3 = A little, Minimum/Contact Guard/Supervision  4 = None, Modified [place]/Independent    Turning over in bed (including adjusting bedclothes, sheets and blankets)?: 3  Sitting down on and standing up from a chair with arms (e.g., wheelchair, bedside commode, etc.): 2  Moving from lying on back to sitting on the side of the bed?: 3  Moving to and from a bed to a chair (including a wheelchair)?: 3  Need to walk in hospital room?: 1  Climbing 3-5 steps with a railing?: 1 (NT)  Basic Mobility Total Score: 13    AM-PAC Raw Score CMS G-Code Modifier Level of Impairment Assistance   6 % Total / Unable   7 - 9 CM 80 - 100% Maximal Assist   10 - 14 CL 60 - 80% Moderate Assist   15 - 19 CK 40 - 60% Moderate Assist   20 - 22 CJ 20 - 40% Minimal Assist   23 CI 1-20% SBA / CGA   24 CH 0% Independent/ Mod I     Patient left up in chair with call button in reach and chair alarm on.    Assessment:  Shireen Felix is a 79 y.o. female with a medical diagnosis of UIP (usual interstitial pneumonitis) and presents with overall decline in functional mobility. Patient would continue to benefit from skilled PT to address functional limitations listed below in order to return to PLOF/decrease caregiver burden.     Rehab identified problem list/impairments: weakness, impaired endurance, impaired self care skills, impaired functional mobility, gait instability, impaired balance, decreased safety awareness, decreased lower extremity function, decreased upper extremity function, decreased coordination, impaired cognition, decreased ROM, impaired cardiopulmonary response to activity    Rehab potential is fair.    Activity tolerance: Fair    Discharge recommendations: Moderate Intensity Therapy      Barriers to discharge:      Equipment recommendations: to be determined by next level of care     GOALS:   Multidisciplinary Problems        Physical Therapy Goals          Problem: Physical Therapy    Goal Priority Disciplines Outcome Interventions   Physical Therapy Goal     PT, PT/OT Progressing    Description: Goals to be met by 1/31/25.  1. Pt will complete bed mobility S.  2. Pt will complete sit to stand MIN A .  3. Pt will ambulate 50Ft WITH MIN A using RW.  4. Pt will increase AMPAC score by 2 points to progress functional mobility.                       DME Justifications:  No DME recommended requiring DME justifications    PLAN:    Patient to be seen 3 x/week to address the above listed problems via gait training, therapeutic activities, therapeutic exercises  Plan of Care expires: 01/31/25  Plan of Care reviewed with: patient         01/20/2025

## 2025-01-20 NOTE — PT/OT/SLP PROGRESS
Occupational Therapy   Treatment    Name: Shireen Felix  MRN: [id number]  Admitting Diagnosis:  UIP (usual interstitial pneumonitis)       Recommendations:     Discharge Recommendations: Moderate Intensity Therapy  Discharge Equipment Recommendations:  to be determined by next level of care  Barriers to discharge:  Decreased caregiver support    Assessment:     Shireen Felix is a 79 y.o. female with a medical diagnosis of UIP (usual interstitial pneumonitis).  She presents with the following performance deficits affecting function are weakness, impaired endurance, impaired self care skills, impaired sensation, impaired functional mobility, gait instability, impaired balance, impaired cognition, decreased coordination, decreased upper extremity function, decreased lower extremity function, decreased safety awareness, impaired cardiopulmonary response to activity, decreased ROM.     Rehab Prognosis:  Good; patient would benefit from acute skilled OT services to address these deficits and reach maximum level of function.       Plan:     Patient to be seen 2 x/week to address the above listed problems via self-care/home management, therapeutic activities, therapeutic exercises  Plan of Care Expires: 01/17/25  Plan of Care Reviewed with: patient    Subjective     Chief Complaint: Needs to use restroom.   Patient/Family Comments/goals: To return to PLOF.   Pain/Comfort:  Pain Rating 1: 0/10    Objective:     Communicated with: nurse prior to session.  Patient found HOB elevated with peripheral IV, oxygen, pulse ox (continuous) (AVASYS) upon OT entry to room.    General Precautions: Standard, airborne, contact, droplet, fall    Orthopedic Precautions:N/A  Braces: N/A  Respiratory Status: Nasal cannula, flow 6 L/min     Occupational Performance:     Bed Mobility:    Patient completed Scooting/Bridging with contact guard assistance  Patient completed Supine to Sit with contact guard assistance     Functional  Mobility/Transfers:  Patient completed Sit <> Stand Transfer with moderate assistance  with  rolling walker and V   Patient completed Bed <> Chair Transfer using Stand Pivot technique with minimum assistance with rolling walker  Patient completed Toilet Transfer Stand Pivot technique with minimum assistance with  rolling walker and bedside commode    Activities of Daily Living:  Toileting: moderate assistance for clothing management to lower and raise brief over hips d/t impaired standing balance with RW. Patient needed to maintain B UE on RW to maintain balance, so unable to manage clothing. Pt complete hygiene with Set up A.      [facility] 6 Click ADL: 17    Treatment & Education:  Transfer and ADL training. See above.     Patient left up in chair with all lines intact, call button in reach, and chair alarm on    GOALS:   Multidisciplinary Problems       Occupational Therapy Goals          Problem: Occupational Therapy    Goal Priority Disciplines Outcome Interventions   Occupational Therapy Goal     OT, PT/OT Progressing    Description: O.T GOALS MET BY 1-17-25  PT WILL TOLERATE  1 SET X 12 REPS B UE ROM EXERCISE  S WITH UE DRESSING  S WITH LE DRESSING  S WITH TOILET TRANSFERS                         Time Tracking:     OT Date of Treatment: 01/20/25  OT Start Time: 1140  OT Stop Time: 1205  OT Total Time (min): 25 min    Billable Minutes:Self Care/Home Management 25    OT/CORINA: OT     Number of CORINA visits since last OT visit: 0    1/20/2025

## 2025-01-20 NOTE — ASSESSMENT & PLAN NOTE
"Patient has Diastolic (HFpEF) heart failure that is Chronic. On presentation their CHF was well compensated. Most recent BNP and echo results are listed below.  No results for input(s): "BNP" in the last 72 hours.    Latest ECHO  Results for orders placed during the hospital encounter of 06/02/23    Echo    Interpretation Summary  · The left ventricle is normal in size with concentric hypertrophy and low normal systolic function.  · The estimated ejection fraction is 50%.  · Normal left ventricular diastolic function.  · There is abnormal septal wall motion consistent with left bundle branch block.  · Normal right ventricular size with normal right ventricular systolic function.  · Normal central venous pressure (3 mmHg).  · The estimated PA systolic pressure is 30 mmHg.  · Mild mitral regurgitation.    Current Heart Failure Medications  furosemide tablet 20 mg, Daily, Oral    Plan  - Monitor strict I&Os and daily weights.    - Place on telemetry  - Low sodium diet  - Place on fluid restriction of 1.5 L.   - Cardiology has not been consulted  - The patient's volume status is at their baseline  -       "

## 2025-01-20 NOTE — ASSESSMENT & PLAN NOTE
Pulm fibrosis hx- followed by pulmonology outpatient  Likely had flare of pulmonary fibrosis secondary to the COVID infection  This likely explains her continued symptoms, hypoxia, cough, etc.   We had been initially discussing whether patient would need to transitioned to hospice as she was requiring up to 10 L  Oxygen requirement has improved significantly, now on Ventimask only to avoid nasal cannula, but truly requires only about 1 L at this time

## 2025-01-20 NOTE — ASSESSMENT & PLAN NOTE
Patient with paroxysmal (<7 days) atrial fibrillation which is {:controlled currently without medication. Patient is currently in sinus rhythm.[id number] Score: 5. HASBLED Score: . Anticoagulation Patient is not anticoagulated due to bleeding risk .

## 2025-01-20 NOTE — PROGRESS NOTES
[facility] Medicine  Progress Note    Patient Name: Shireen Felix  MRN: [id number]  Patient Class: IP- Inpatient   Admission Date: 12/31/2024  Length of Stay: 19 days  Attending Physician: Yoselin Rodriguez MD  Primary Care Provider: Laron Chaudhari MD        Subjective     Principal Problem:UIP (usual interstitial pneumonitis)        HPI:  Shireen Felix is a 79 year old female who has  has a past medical history of Abnormal ECG, Acute bronchitis and bronchiolitis, Aneurysm, Anticoagulant long-term use, Arthritis, CAD in native artery, COPD (chronic obstructive pulmonary disease), Diabetes mellitus, Fall, Glaucoma, Hypertension, Seizures, Shingles, and Stroke who presented to Emergency Department for evaluation for cough. Associated symptoms include: congestion, wheezing, and SOB. She was seen by her pulmonologist today, Dr. Sam. Started as sinus pain and drainage and now has progressed to respiratory symptoms. She was seen a few days ago in the ER given prescription for antibiotics steroids but these have not helped much. Today in the office she can barely talk without coughing continually. She is having a difficult time putting more than 3 words together without severe cough paroxysms. ED workup showed Hgb/Hct 11.9/37.0, K+ 3.1, CO2 16, BUN/creatinine 34/1.5. CT chest w/out contrast stable suspected pulmonary fibrosis of the lungs. No acute infiltrates. She has received DuoNeb x 1, solumedrol 125 mg IV x 1, KCL 40 mEq PO x 1 and NS 1L bolus. Pt admitted for COVID.     Overview/Hospital Course:  Admitted to Hospital Medicine for acute on chronic respiratory failure concerns in setting of COVID-19.  Started on IV methylprednisolone, remdesivir, scheduled respiratory treatments. 01/04: Reported left lower quadrant abdominal pain with tenderness to palpation, will evaluate via CT as patient is on therapeutic anticoagulation for COVID-19.  CT abdomen/pelvis nonacute.  01/05:  Reported improvements in respiratory status with decreased frequency of cough and dyspnea but still reporting significant symptoms from baseline.  On 1/6, patient had a significant event with acute tachypnea, tachycardia. Pulmonology consulted, orders adjusted. Patient stable overnight, had another event on 1/7 similar to previous day. Possible exacerbation related to anxiety provoked by medications and congestion. Case reviewed, orders adjusted. Per Pulmonology, due to underlying lung fibrosis, patient is likely not benefiting from bronchodilators. Stopped on 1/7, patient did not experience any further episodes of anxiety, respiratory distress. Will continue xanax and prn morphine. Patient with epistaxis, small clots in tissue noted. Reduced supplemental O2 to 1L at rest, stopped full dose lovenox and placed on ppx dose. Hgb stable.   Difficulty with discharge plan, patient originally planned to d/c to SNF, now prefers to d/c home.  Pulmonology discussed home palliative care vs hospice.  Patient and family wish to discuss the Services further.  Per discussion with patient, family will be available for an informational visit on Monday.  Educated patient on results of oxygen evaluation, advised patient to reduce oxygen to 2 L or less at rest, requires 4 L with exertion.  As clinically indicated if significant improvement noted will repeat oxygen evaluation.     01/12/2025  O2 sats stable on 2L (while at rest). Patient and family wants to try and go home, but unable to participate with PT over weekend. Repeat desat study on Monday. May have to consider palliative measures if progress plateaus.      01/13/2025  Feeling slightly better today than she felt a few days ago but overall still significantly short of breath with exertion.  Discussed with her, palliative Care, and her family.  She does not want to change to hospice at this time.  She wants to try to get better.  She does want to be DNR though.   Appreciate further palliative care involvement.  Still desaturating significantly with exertion and unable to work with PT because of this    01/14/2025  Feeling perhaps slightly better today, attempting to work with physical therapy.  We are hoping that we could either get her to rehab if her oxygen requirements with exertion go down, or potentially get a bigger concentrator at home for increasing her up to 10 L with exertion at home.  Otherwise no changes to the plan    01/15/2025:  Still having coughing spells but notably improved.  Oxygen is being down titrated and she even got a room air today.  We are screening her for rehabs    01/16/2025:  Coughing she states is much worse when oxygen is off.  She was on room air but I put her on 1 L to see if there is truly an effect.  She is still being screened for rehabs.  Overall she looks much better.  She did technically have a fever today to 101.4 but when I evaluate her she looks notably better, CXR is negative for acute change, would not recommend starting antibiotics and she has defervesced    1/17/25: Somnolent this AM, spiking more fevers yesterday evening, and mildly soft BP this AM. Lactate wnl. Alert and oriented on my eval. CXR with ? Infiltrate, more cough, feeling worse. Will start ABX for PNA, blood cultures, sputum culture    01/18/2025: Significant epistaxis and hemoptysis overnight.  CT with and without contrast did not reveal a source of bleed.  Still coughing with hemoptysis this morning.  On exam with obvious epistaxis, packed by ENT, resolved.  Coughing has improved.  Currently on Ventimask to avoid nasal cannula     01/19/2025: Doing better today, no more hemoptysis, less cough, no more epistaxis, hemoglobin up slightly.  Still on Ventimask to avoid nasal cannula but only requiring minimal quantities of oxygen, can take it off to eat.  Listed precautions yesterday evening for COVID given has been more than 10 days since diagnosis    1/20/25:  reporting abdominal pain when coughing. States she reported yesterday as well.     Interval History: f/u respiratory failure  plan skilled placement. Added acetaminophen for pain. Likely musculoskeletal    Review of Systems  Objective:     Vital Signs (Most Recent):  Temp: 97.4 °F (36.3 °C) (01/20/25 1314)  Pulse: 86 (01/20/25 1314)  Resp: 18 (01/20/25 1314)  BP: (!) 121/58 (01/20/25 1314)  SpO2: 100 % (01/20/25 1314) Vital Signs (24h Range):  Temp:  [97 °F (36.1 °C)-98.6 °F (37 °C)] 97.4 °F (36.3 °C)  Pulse:  [68-89] 86  Resp:  [15-20] 18  SpO2:  [90 %-100 %] 100 %  BP: (110-146)/(56-65) 121/58     Weight: 54.2 kg (119 lb 7.8 oz)  Body mass index is 20.51 kg/m².    Intake/Output Summary (Last 24 hours) at 1/20/2025 1339  Last data filed at 1/20/2025 1005  Gross per 24 hour   Intake 716 ml   Output --   Net 716 ml         Physical Exam  Constitutional:       Appearance: She is ill-appearing.   HENT:      Head: Normocephalic and atraumatic.   Cardiovascular:      Rate and Rhythm: Normal rate and regular rhythm.      Heart sounds: No murmur heard.  Pulmonary:      Effort: Pulmonary effort is normal. No respiratory distress.      Breath sounds: Normal breath sounds. No wheezing.   Abdominal:      General: Bowel sounds are normal. There is no distension.      Palpations: Abdomen is soft.      Tenderness: There is no abdominal tenderness.   Musculoskeletal:         General: No swelling.   Skin:     General: Skin is warm and dry.   Neurological:      Mental Status: She is alert and oriented to person, place, and time. Mental status is at baseline.      Motor: Weakness present.             Significant Labs: All pertinent labs within the past 24 hours have been reviewed.  Recent Lab Results         01/20/25  0645   01/19/25  1353        Anion Gap 11   12       Baso # 0.00         Basophil % 0.0         BUN 60   59       Calcium 8.2   9.3       Chloride 103   105       CO2 14   17       Creatinine 1.3   1.2        Differential Method Automated         eGFR 42   46       Eos # 0.0         Eos % 0.5         Glucose 84   85       Gran # (ANC) 4.9         Gran % 74.8         Hematocrit 27.4         Hemoglobin 8.1         Immature Grans (Abs) 0.07  Comment: Mild elevation in immature granulocytes is non specific and   can be seen in a variety of conditions including stress response,   acute inflammation, trauma and pregnancy. Correlation with other   laboratory and clinical findings is essential.           Immature Granulocytes 1.1         Lymph # 1.0         Lymph % 15.8         Magnesium  1.9         MCH 29.9         MCHC 29.6                  Mono # 0.5         Mono % 7.8         MPV 10.2         nRBC 0         Phosphorus Level 3.8         Platelet Count 140         Potassium 6.0   5.2       RBC 2.71         RDW 14.5         Sodium 128   134       WBC 6.51                 Significant Imaging: I have reviewed all pertinent imaging results/findings within the past 24 hours.    Assessment and Plan     * UIP (usual interstitial pneumonitis)  Pulm fibrosis hx- followed by pulmonology outpatient  Likely had flare of pulmonary fibrosis secondary to the COVID infection  This likely explains her continued symptoms, hypoxia, cough, etc.   We had been initially discussing whether patient would need to transitioned to hospice as she was requiring up to 10 L  Oxygen requirement has improved significantly, now on Ventimask only to avoid nasal cannula, but truly requires only about 1 L at this time         Hyperkalemia  Hyperkalemia is likely due to  metabolic acidosis .The patients most recent potassium results are listed below.  Recent Labs     01/19/25  0453 01/19/25  1353 01/20/25  0645   K 5.3* 5.2* 6.0*     Plan  - Monitor for arrhythmias with EKG and/or continuous telemetry.   - Treat the hyperkalemia with Potassium Binders, Furosemide, and Sodium Bicarbonate.   - Monitor potassium: Daily  - The patient's hyperkalemia is  worsening. Will continue current treatment            Atrial fibrillation  Patient with paroxysmal (<7 days) atrial fibrillation which is {:controlled currently without medication. Patient is currently in sinus rhythm.[id number] Score: 5. HASBLED Score: . Anticoagulation Patient is not anticoagulated due to bleeding risk .           Nasal septal deviation        Hemoptysis  Likely secondary to the epistaxis.  CT with and without contrast negative.  On TXA nebs but if no further hemoptysis may be able to stop soon.  Likely exacerbated by the coughing so adding cough syrup with codeine today in addition to her other antitussives. Improved significantly after addressing the epistaxis 01/18/2025      Hospital acquired PNA  1/17/25: Patient more lethargic, with worsening cough and shortness of breath.  CXR with possible left-sided infiltrate.  I suspect postviral pneumonia, hospital-acquired.  Blood and sputum cultures pending.  Lactate is normal.  Has a penicillin allergy.  Started on cefepime.  Providing supplemental oxygen and we will monitor closely.  plan to complete a 7 day course    Encounter for palliative care  Palliative Care is following.  Receiving hydrocodone/acetaminophen p.r.n., morphine p.r.n., alprazolam p.r.n. for respiratory distress with good effect      Epistaxis  Likely secondary to supplemental O2, septal defect.  ENT packed the nose 1/18/25 and it seems to have resolved.  I think this is the cause of hemoptysis.  Pulm saw the patient as well and she is on tranexamic acid nebs although we do not feel the bleeding is coming from the lungs.  CT with and without contrast did not reveal a source of bleeding.  Hemoglobin has been dropping over the past few days but is not increasing after addressing the epistaxis.    -Mucomyst nasal spray to keep the septum moist    Acute on chronic respiratory failure with hypoxia  Patient with Hypoxic Respiratory failure which is Acute on chronic.  she is not on home  oxygen. Supplemental oxygen was provided and noted- Oxygen Concentration (%):  [28] 28    .   Signs/symptoms of respiratory failure include- tachypnea, increased work of breathing, respiratory distress, use of accessory muscles, wheezing, stridor, and lethargy. Contributing diagnoses includes - Interstitial lung disease Labs and images were reviewed. Patient Has recent ABG, which has been reviewed. Will treat underlying causes and adjust management of respiratory failure as follows-     Home Oxygen evaluation performed:  Home Oxygen Evaluation - [facility] - Cardiopulmonary Department      Date Performed: 1/8/2025     1)Patient's Home O2 Sat on room air, while at rest: Room Air SpO2 At Rest: 99 %                             If O2 sats on room air at rest are 88% or below, patient qualifies.  Document O2 liter flow needed in Step 2.  If O2 sats are 89% or above, complete Step 3.        2)         If patient is not ambulated and O2 sats are <88%, what is the O2 liter flow required to meet ordered saturation? Home O2 Eval Comments: pt qualifies for home oxygen     If O2 sats on room air while exercising remain 89% or above patient does not qualify, no further testing needed Document N/A in step 3. If O2 sats on room air while exercising are 88% or below, continue to Step 4.     3)Patient's O2 Sat on room air while exercising: Room Air SpO2 During Ambulation: (!) 78 %                             4)Patient's O2 Sat while exercising on O2: SpO2 During Ambulation on O2: 96 % at Ambulation O2 LPM: 4 LPM                             (Must show improvement from #4 for patients to qualify)    Patient qualifies for Home O2 @4L NC due to exertional dyspnea, hypoxia    Hypokalemia  Patient's most recent potassium results are listed below.   Recent Labs     01/08/25  0604 01/09/25  0508 01/10/25  0531   K 4.3 4.9 4.6       Plan  - Replete potassium per protocol  - Monitor potassium Daily  - Patient's hypokalemia is  being  "treated. Received KCL 40 mEq PO x 1 in ED  -  Stable    CKD (chronic kidney disease)  Creatine stable for now. BMP reviewed- noted Estimated Creatinine Clearance: 29.5 mL/min (based on SCr of 1.3 mg/dL). according to latest data. Based on current GFR, CKD stage is stage 4 - GFR 15-29.  Monitor UOP and serial BMP and adjust therapy as needed. Renally dose meds. Avoid nephrotoxic medications and procedures.  -Creatinine 1.5 on admission however 3 months ago was 1.2    Recent Labs     01/19/25  0453 01/19/25  1353 01/20/25  0645   CREATININE 1.2 1.2 1.3         -Due to GFR, will need to dc ranexa for now  -Holding spironolactone, entresto  -Monitor creatinine      Chronic systolic heart failure  Patient has Diastolic (HFpEF) heart failure that is Chronic. On presentation their CHF was well compensated. Most recent BNP and echo results are listed below.  No results for input(s): "BNP" in the last 72 hours.    Latest ECHO  Results for orders placed during the hospital encounter of 06/02/23    Echo    Interpretation Summary  · The left ventricle is normal in size with concentric hypertrophy and low normal systolic function.  · The estimated ejection fraction is 50%.  · Normal left ventricular diastolic function.  · There is abnormal septal wall motion consistent with left bundle branch block.  · Normal right ventricular size with normal right ventricular systolic function.  · Normal central venous pressure (3 mmHg).  · The estimated PA systolic pressure is 30 mmHg.  · Mild mitral regurgitation.    Current Heart Failure Medications  furosemide tablet 20 mg, Daily, Oral    Plan  - Monitor strict I&Os and daily weights.    - Place on telemetry  - Low sodium diet  - Place on fluid restriction of 1.5 L.   - Cardiology has not been consulted  - The patient's volume status is at their baseline  -         COVID-19  ** initially COVID positive on 12/31 on admission **   Patient is identified as Severe COVID-19 based on hypoxemia " with O2 saturations <94% on room air or on ambulation   Initiated standard COVID protocols; COVID-19 testing ,Infection Control notification  and isolation- respiratory, contact and droplet per protocol    Management: Treated with targetedtherapy with Remdesivir, 200mg IV x1, followed by 100mg IV daily x5 days total and Anticoagulation, Patient admitted to non-critical care unit- Will initiate full dose anticoagulation with Weight based lovenox 1mg/kg IV q12, stopped on 1/11/25, Maintain oxygen saturations 92-96% via Nasal Cannula  and monitor with continuous/intermittent pulse oximetry. , and Continuous cardiac monitoring.    As of 01/18/2025 DC'd  precautions as it has been more than 10 days since onset, and her persistent symptoms are likely a flare of her interstitial lung disease/postviral pneumonia     Mixed hyperlipidemia  Lab Results   Component Value Date    CHOL 112 (L) 07/12/2024    HDL 41 07/12/2024    LDLCALC 49.8 (L) 07/12/2024    TRIG 106 07/12/2024     -Continue Statin    Fall  Fall night of 1/15 with head strike   Evaluated by nighttime hospitalist   Head CT was negative   Neuro exam on 01/16 is normal   Fall precautions  Continue therapy      Normocytic anemia  Anemia is likely due to chronic disease due to Chronic Kidney Disease. Most recent hemoglobin and hematocrit are listed below.  Recent Labs     01/18/25  0424 01/19/25  0453 01/20/25  0645   HGB 8.6*  8.5* 9.2* 8.1*   HCT 27.4*  27.2* 30.4* 27.4*       Plan  - Monitor serial CBC: Daily  - Transfuse PRBC if patient becomes hemodynamically unstable, symptomatic or H/H drops below 7/21.  - Patient has not received any PRBC transfusions to date  - Patient's anemia is currently stable      Seizure  Hx of seizures, patient has not had a seizure during this admission.   At home is on seizure medication Aptiom 400mg daily and Keppra 1,000mg BID  Aptiom likely worsening hyponatremia and also inhibiting SNF placement due to cost, so discussed with  neuro on call  Per neuro on call, would DC Aptiom and increase Keppra to 1,500mg BID, change made  Can follow up with neuro as outpatient to revisit        Coronary artery disease of native artery of native heart with stable angina pectoris  Patient with known CAD  No anginal signs or symptoms   On Plavix and statin as an outpatient   Holding Plavix currently for the epistaxis     COPD exacerbation  Unclear if truly exacerbated versus wheezing/shortness of breath from pneumonia or mild fluid overload.  Providing budesonide/formoterol inhalers, ipratropium as needed, and brief prednisone course    GERD (gastroesophageal reflux disease)  -Continue PPI      Nasal septal perforation  Was likely contributing to the epistaxis   Seen by ENT   Packing in place   No further bleeding  We will provide Mucomyst nasal spray to the septal defect especially on the right side where it and this especially dry and crusted to try to prevent further bleeding        VTE Risk Mitigation (From admission, onward)      None            Discharge Planning   KATHERINE:      Code Status: DNR   Medical Readiness for Discharge Date:   Discharge Plan A: Skilled Nursing Facility   Discharge Delays: None known at this time            Please place Justification for DME        Yoselin Rodriguez MD  Department of Hospital Medicine   [facility] - Telemetry ([facility])

## 2025-01-20 NOTE — ASSESSMENT & PLAN NOTE
Patient with known CAD  No anginal signs or symptoms   On Plavix and statin as an outpatient   Holding Plavix currently for the epistaxis

## 2025-01-21 LAB
ANION GAP SERPL CALC-SCNC: 14 MMOL/L (ref 8–16)
BASOPHILS # BLD AUTO: 0.01 K/UL (ref 0–0.2)
BASOPHILS NFR BLD: 0.1 % (ref 0–1.9)
BUN SERPL-MCNC: 59 MG/DL (ref 8–23)
CALCIUM SERPL-MCNC: 9.4 MG/DL (ref 8.7–10.5)
CHLORIDE SERPL-SCNC: 105 MMOL/L (ref 95–110)
CO2 SERPL-SCNC: 17 MMOL/L (ref 23–29)
CREAT SERPL-MCNC: 1.4 MG/DL (ref 0.5–1.4)
DIFFERENTIAL METHOD BLD: ABNORMAL
EOSINOPHIL # BLD AUTO: 0.1 K/UL (ref 0–0.5)
EOSINOPHIL NFR BLD: 1.7 % (ref 0–8)
ERYTHROCYTE [DISTWIDTH] IN BLOOD BY AUTOMATED COUNT: 14.5 % (ref 11.5–14.5)
EST. GFR  (NO RACE VARIABLE): 38 ML/MIN/1.73 M^2
GLUCOSE SERPL-MCNC: 68 MG/DL (ref 70–110)
HCT VFR BLD AUTO: 29 % (ref 37–48.5)
HGB BLD-MCNC: 9 G/DL (ref 12–16)
IMM GRANULOCYTES # BLD AUTO: 0.19 K/UL (ref 0–0.04)
IMM GRANULOCYTES NFR BLD AUTO: 2.6 % (ref 0–0.5)
LYMPHOCYTES # BLD AUTO: 1.5 K/UL (ref 1–4.8)
LYMPHOCYTES NFR BLD: 21.5 % (ref 18–48)
MAGNESIUM SERPL-MCNC: 2.2 MG/DL (ref 1.6–2.6)
MCH RBC QN AUTO: 30.9 PG (ref 27–31)
MCHC RBC AUTO-ENTMCNC: 31 G/DL (ref 32–36)
MCV RBC AUTO: 100 FL (ref 82–98)
MONOCYTES # BLD AUTO: 0.7 K/UL (ref 0.3–1)
MONOCYTES NFR BLD: 9.3 % (ref 4–15)
NEUTROPHILS # BLD AUTO: 4.6 K/UL (ref 1.8–7.7)
NEUTROPHILS NFR BLD: 64.8 % (ref 38–73)
NRBC BLD-RTO: 0 /100 WBC
PHOSPHATE SERPL-MCNC: 4.7 MG/DL (ref 2.7–4.5)
PLATELET # BLD AUTO: 195 K/UL (ref 150–450)
PLATELET BLD QL SMEAR: ABNORMAL
PMV BLD AUTO: 10.6 FL (ref 9.2–12.9)
POTASSIUM SERPL-SCNC: 5.4 MMOL/L (ref 3.5–5.1)
RBC # BLD AUTO: 2.91 M/UL (ref 4–5.4)
SODIUM SERPL-SCNC: 136 MMOL/L (ref 136–145)
WBC # BLD AUTO: 7.17 K/UL (ref 3.9–12.7)

## 2025-01-21 PROCEDURE — 27000221 HC OXYGEN, UP TO 24 HOURS

## 2025-01-21 PROCEDURE — 25000003 PHARM REV CODE 250: Performed by: NURSE PRACTITIONER

## 2025-01-21 PROCEDURE — 85025 COMPLETE CBC W/AUTO DIFF WBC: CPT | Performed by: STUDENT IN AN ORGANIZED HEALTH CARE EDUCATION/TRAINING PROGRAM

## 2025-01-21 PROCEDURE — 25000242 PHARM REV CODE 250 ALT 637 W/ HCPCS: Performed by: STUDENT IN AN ORGANIZED HEALTH CARE EDUCATION/TRAINING PROGRAM

## 2025-01-21 PROCEDURE — 80048 BASIC METABOLIC PNL TOTAL CA: CPT | Performed by: STUDENT IN AN ORGANIZED HEALTH CARE EDUCATION/TRAINING PROGRAM

## 2025-01-21 PROCEDURE — 36415 COLL VENOUS BLD VENIPUNCTURE: CPT | Performed by: STUDENT IN AN ORGANIZED HEALTH CARE EDUCATION/TRAINING PROGRAM

## 2025-01-21 PROCEDURE — 94761 N-INVAS EAR/PLS OXIMETRY MLT: CPT

## 2025-01-21 PROCEDURE — 21400001 HC TELEMETRY ROOM

## 2025-01-21 PROCEDURE — 83735 ASSAY OF MAGNESIUM: CPT | Performed by: STUDENT IN AN ORGANIZED HEALTH CARE EDUCATION/TRAINING PROGRAM

## 2025-01-21 PROCEDURE — 99900035 HC TECH TIME PER 15 MIN (STAT)

## 2025-01-21 PROCEDURE — 63600175 PHARM REV CODE 636 W HCPCS: Performed by: INTERNAL MEDICINE

## 2025-01-21 PROCEDURE — 25000003 PHARM REV CODE 250: Performed by: INTERNAL MEDICINE

## 2025-01-21 PROCEDURE — 63600175 PHARM REV CODE 636 W HCPCS: Performed by: STUDENT IN AN ORGANIZED HEALTH CARE EDUCATION/TRAINING PROGRAM

## 2025-01-21 PROCEDURE — 94640 AIRWAY INHALATION TREATMENT: CPT

## 2025-01-21 PROCEDURE — 84100 ASSAY OF PHOSPHORUS: CPT | Performed by: STUDENT IN AN ORGANIZED HEALTH CARE EDUCATION/TRAINING PROGRAM

## 2025-01-21 PROCEDURE — 25000003 PHARM REV CODE 250: Performed by: STUDENT IN AN ORGANIZED HEALTH CARE EDUCATION/TRAINING PROGRAM

## 2025-01-21 PROCEDURE — 25000242 PHARM REV CODE 250 ALT 637 W/ HCPCS: Performed by: INTERNAL MEDICINE

## 2025-01-21 RX ORDER — IPRATROPIUM BROMIDE AND ALBUTEROL SULFATE 2.5; .5 MG/3ML; MG/3ML
3 SOLUTION RESPIRATORY (INHALATION) EVERY 6 HOURS
Status: DISCONTINUED | OUTPATIENT
Start: 2025-01-21 | End: 2025-01-28 | Stop reason: HOSPADM

## 2025-01-21 RX ORDER — GABAPENTIN 300 MG/1
300 CAPSULE ORAL 2 TIMES DAILY
Status: DISCONTINUED | OUTPATIENT
Start: 2025-01-21 | End: 2025-01-28 | Stop reason: HOSPADM

## 2025-01-21 RX ORDER — CEFEPIME HYDROCHLORIDE 1 G/1
1 INJECTION, POWDER, FOR SOLUTION INTRAMUSCULAR; INTRAVENOUS
Status: DISCONTINUED | OUTPATIENT
Start: 2025-01-21 | End: 2025-01-24

## 2025-01-21 RX ADMIN — GUAIFENESIN 1200 MG: 600 TABLET, EXTENDED RELEASE ORAL at 08:01

## 2025-01-21 RX ADMIN — ALPRAZOLAM 0.25 MG: 0.25 TABLET ORAL at 09:01

## 2025-01-21 RX ADMIN — POLYETHYLENE GLYCOL 3350 17 G: 17 POWDER, FOR SOLUTION ORAL at 09:01

## 2025-01-21 RX ADMIN — THERA TABS 1 TABLET: TAB at 09:01

## 2025-01-21 RX ADMIN — PANTOPRAZOLE SODIUM 40 MG: 40 TABLET, DELAYED RELEASE ORAL at 08:01

## 2025-01-21 RX ADMIN — SALINE NASAL SPRAY 1 SPRAY: 1.5 SOLUTION NASAL at 09:01

## 2025-01-21 RX ADMIN — PROMETHAZINE HYDROCHLORIDE AND CODEINE PHOSPHATE 5 ML: 6.25; 1 SOLUTION ORAL at 09:01

## 2025-01-21 RX ADMIN — ARFORMOTEROL TARTRATE 15 MCG: 15 SOLUTION RESPIRATORY (INHALATION) at 09:01

## 2025-01-21 RX ADMIN — IPRATROPIUM BROMIDE AND ALBUTEROL SULFATE 3 ML: 2.5; .5 SOLUTION RESPIRATORY (INHALATION) at 08:01

## 2025-01-21 RX ADMIN — ALPRAZOLAM 0.25 MG: 0.25 TABLET ORAL at 08:01

## 2025-01-21 RX ADMIN — IPRATROPIUM BROMIDE AND ALBUTEROL SULFATE 3 ML: 2.5; .5 SOLUTION RESPIRATORY (INHALATION) at 06:01

## 2025-01-21 RX ADMIN — BUDESONIDE INHALATION 0.5 MG: 0.5 SUSPENSION RESPIRATORY (INHALATION) at 06:01

## 2025-01-21 RX ADMIN — PREDNISONE 40 MG: 20 TABLET ORAL at 09:01

## 2025-01-21 RX ADMIN — LEVETIRACETAM 1500 MG: 500 TABLET, FILM COATED ORAL at 09:01

## 2025-01-21 RX ADMIN — BENZONATATE 200 MG: 100 CAPSULE ORAL at 08:01

## 2025-01-21 RX ADMIN — ARFORMOTEROL TARTRATE 15 MCG: 15 SOLUTION RESPIRATORY (INHALATION) at 07:01

## 2025-01-21 RX ADMIN — GABAPENTIN 300 MG: 300 CAPSULE ORAL at 09:01

## 2025-01-21 RX ADMIN — LEVETIRACETAM 1500 MG: 500 TABLET, FILM COATED ORAL at 08:01

## 2025-01-21 RX ADMIN — IPRATROPIUM BROMIDE AND ALBUTEROL SULFATE 3 ML: 2.5; .5 SOLUTION RESPIRATORY (INHALATION) at 12:01

## 2025-01-21 RX ADMIN — SALINE NASAL SPRAY 1 SPRAY: 1.5 SOLUTION NASAL at 08:01

## 2025-01-21 RX ADMIN — OXYCODONE HYDROCHLORIDE AND ACETAMINOPHEN 500 MG: 500 TABLET ORAL at 09:01

## 2025-01-21 RX ADMIN — BENZONATATE 200 MG: 100 CAPSULE ORAL at 02:01

## 2025-01-21 RX ADMIN — PANTOPRAZOLE SODIUM 40 MG: 40 TABLET, DELAYED RELEASE ORAL at 09:01

## 2025-01-21 RX ADMIN — CEFEPIME 1 G: 1 INJECTION, POWDER, FOR SOLUTION INTRAMUSCULAR; INTRAVENOUS at 12:01

## 2025-01-21 RX ADMIN — GUAIFENESIN 1200 MG: 600 TABLET, EXTENDED RELEASE ORAL at 09:01

## 2025-01-21 RX ADMIN — FUROSEMIDE 20 MG: 20 TABLET ORAL at 09:01

## 2025-01-21 RX ADMIN — SALINE NASAL SPRAY 1 SPRAY: 1.5 SOLUTION NASAL at 02:01

## 2025-01-21 RX ADMIN — SODIUM ZIRCONIUM CYCLOSILICATE 10 G: 5 POWDER, FOR SUSPENSION ORAL at 09:01

## 2025-01-21 RX ADMIN — ESCITALOPRAM OXALATE 20 MG: 10 TABLET, FILM COATED ORAL at 09:01

## 2025-01-21 RX ADMIN — MONTELUKAST 10 MG: 10 TABLET, FILM COATED ORAL at 09:01

## 2025-01-21 RX ADMIN — GABAPENTIN 300 MG: 300 CAPSULE ORAL at 08:01

## 2025-01-21 RX ADMIN — ATORVASTATIN CALCIUM 40 MG: 40 TABLET, FILM COATED ORAL at 09:01

## 2025-01-21 RX ADMIN — BENZONATATE 200 MG: 100 CAPSULE ORAL at 09:01

## 2025-01-21 RX ADMIN — BUDESONIDE INHALATION 0.5 MG: 0.5 SUSPENSION RESPIRATORY (INHALATION) at 08:01

## 2025-01-21 RX ADMIN — OXYCODONE HYDROCHLORIDE AND ACETAMINOPHEN 500 MG: 500 TABLET ORAL at 08:01

## 2025-01-21 NOTE — PLAN OF CARE
A223/A223 OLU Felix is a 79 y.o.female admitted on 12/31/2024 for UIP (usual interstitial pneumonitis)   Code Status: DNR MRN: [id number]   Review of patient's allergies indicates:   Allergen Reactions    Penicillins Anaphylaxis, Hives, Shortness Of Breath and Swelling    Penicillin     Adhesive Rash    Doxycycline Nausea Only    Oxycodone Other (See Comments)     Fatigue      Sulfa (sulfonamide antibiotics) Itching     Past Medical History:   Diagnosis Date    Abnormal ECG 08/05/2019    Acute bronchitis and bronchiolitis 12/31/2024    Aneurysm     Anticoagulant long-term use     Arthritis     CAD in native artery 08/05/2019    Diabetes mellitus     Fall 10/10/2019    Formatting of this note might be different from the original. Found sitting on floor next to bed last night Mild confusion today Does not recall falling or how she ended up on floor UA today Labs yesterday unremarkable    Glaucoma     Hypertension     Pulmonary fibrosis     Seizures     Shingles 05/27/2017    Stroke       PRN meds    acetaminophen, 650 mg, Q6H PRN  ALPRAZolam, 0.25 mg, TID PRN  dextrose 50%, 12.5 g, PRN  dextrose 50%, 25 g, PRN  glucagon (human recombinant), 1 mg, PRN  glucose, 16 g, PRN  glucose, 24 g, PRN  ipratropium, 0.5 mg, Q4H PRN  magic mouthwash (diphenhydrAMINE 12.5 mg/5 mL 20 mL, aluminum & magnesium hydroxide-simethicone (MYLANTA) 20 mL, LIDOcaine HCl 2% (XYLOCAINE) 20 mL) solution, 10 mL, Q4H PRN  melatonin, 6 mg, Nightly PRN  methocarbamoL, 500 mg, TID PRN  morphine, 2 mg, Q4H PRN  ondansetron, 4 mg, Q8H PRN  promethazine-codeine 6.25-10 mg/5 ml, 5 mL, Q4H PRN  sodium chloride 0.9%, 10 mL, PRN         Pt oriented x4.  VSS.  Pt remained afebrile throughout this shift.   All meds administered per order.   Pt remained free of falls this shift.   Plan of care reviewed. Patient verbalizes understanding.   Patient weight shifting, on turn q2h schedule.   Bed low, side rails up x 2, wheels locked, call light in reach.    Patient instructed to call for assistance.  Avasys in use.   Patient education provided      Chart check completed. Will continue plan of care.      Orientation: oriented x 4  [place] Coma Scale Score: 15     Lead Monitored: Lead II Rhythm: sinus tachycardia Frequency/Ectopy: PACs  Cardiac/Telemetry Box Number: [id number]  VTE Core Measure: Pharmacological prophylaxis initiated/maintained Last Bowel Movement: 01/18/25  Diet Cardiac Low Potassium; Isolation Tray - Styrofoam  Voiding Characteristics: incontinence  Gilberto Score: 16  Fall Risk Score: 19  Accucheck []   Freq?      Lines/Drains/Airways       Peripheral Intravenous Line  Duration                  Midline Catheter - Single Lumen 01/17/25 1925 Left basilic vein (medial side of arm) other (see comments) 3 days                    Problem: Adult Inpatient Plan of Care  Goal: Plan of Care Review  Outcome: Progressing  Goal: Patient-Specific Goal (Individualized)  Outcome: Progressing  Goal: Absence of Hospital-Acquired Illness or Injury  Outcome: Progressing  Goal: Optimal Comfort and Wellbeing  Outcome: Progressing  Goal: Readiness for Transition of Care  Outcome: Progressing     Problem: Diabetes Comorbidity  Goal: Blood Glucose Level Within Targeted Range  Outcome: Progressing     Problem: Fall Injury Risk  Goal: Absence of Fall and Fall-Related Injury  Outcome: Progressing     Problem: Wound  Goal: Optimal Coping  Outcome: Progressing  Goal: Optimal Functional Ability  Outcome: Progressing  Goal: Absence of Infection Signs and Symptoms  Outcome: Progressing  Goal: Improved Oral Intake  Outcome: Progressing  Goal: Optimal Pain Control and Function  Outcome: Progressing  Goal: Skin Health and Integrity  Outcome: Progressing  Goal: Optimal Wound Healing  Outcome: Progressing     Problem: Skin Injury Risk Increased  Goal: Skin Health and Integrity  Outcome: Progressing     Problem: Coping Ineffective  Goal: Effective Coping  Outcome: Progressing     Problem:  Pneumonia  Goal: Fluid Balance  Outcome: Progressing  Goal: Resolution of Infection Signs and Symptoms  Outcome: Progressing  Goal: Effective Oxygenation and Ventilation  Outcome: Progressing     Problem: Infection  Goal: Absence of Infection Signs and Symptoms  Outcome: Progressing

## 2025-01-21 NOTE — ASSESSMENT & PLAN NOTE
Was likely contributing to the epistaxis   Seen by ENT   Packing in place   No further bleeding   Mucomyst nasal spray to the septal defect especially on the right side where it and this especially dry and crusted to try to prevent further bleeding

## 2025-01-21 NOTE — ASSESSMENT & PLAN NOTE
Creatine stable for now. BMP reviewed- noted Estimated Creatinine Clearance: 27.4 mL/min (based on SCr of 1.4 mg/dL). according to latest data. Based on current GFR, CKD stage is stage 4 - GFR 15-29.  Monitor UOP and serial BMP and adjust therapy as needed. Renally dose meds. Avoid nephrotoxic medications and procedures.  -Creatinine 1.5 on admission however 3 months ago was 1.2    Recent Labs     01/19/25  1353 01/20/25  0645 01/21/25  0645   CREATININE 1.2 1.3 1.4         -Due to GFR, will need to dc ranexa for now  -Holding spironolactone, entresto  -Monitor creatinine

## 2025-01-21 NOTE — ASSESSMENT & PLAN NOTE
Anemia is likely due to chronic disease due to Chronic Kidney Disease. Most recent hemoglobin and hematocrit are listed below.  Recent Labs     01/19/25  0453 01/20/25  0645 01/21/25  0920   HGB 9.2* 8.1* 9.0*   HCT 30.4* 27.4* 29.0*       Plan  - Monitor serial CBC: Daily  - Transfuse PRBC if patient becomes hemodynamically unstable, symptomatic or H/H drops below 7/21.  - Patient has not received any PRBC transfusions to date  - Patient's anemia is currently stable

## 2025-01-21 NOTE — SUBJECTIVE & OBJECTIVE
Interval History: f/u respiratory failure  audibly wheezing this morning. Reports she is feeling better    Review of Systems  Objective:     Vital Signs (Most Recent):  Temp: 97.7 °F (36.5 °C) (01/21/25 0731)  Pulse: 80 (01/21/25 0853)  Resp: 20 (01/21/25 0853)  BP: (!) 165/73 (01/21/25 0731)  SpO2: 99 % (01/21/25 0853) Vital Signs (24h Range):  Temp:  [97.4 °F (36.3 °C)-98.6 °F (37 °C)] 97.7 °F (36.5 °C)  Pulse:  [] 80  Resp:  [16-20] 20  SpO2:  [96 %-100 %] 99 %  BP: (112-172)/(55-93) 165/73     Weight: 54.2 kg (119 lb 7.8 oz)  Body mass index is 20.51 kg/m².    Intake/Output Summary (Last 24 hours) at 1/21/2025 1059  Last data filed at 1/20/2025 1300  Gross per 24 hour   Intake 240 ml   Output --   Net 240 ml         Physical Exam  HENT:      Head: Normocephalic and atraumatic.   Cardiovascular:      Rate and Rhythm: Normal rate and regular rhythm.      Heart sounds: No murmur heard.  Pulmonary:      Effort: Pulmonary effort is normal. No respiratory distress.      Breath sounds: Wheezing present.   Abdominal:      General: Bowel sounds are normal. There is no distension.      Palpations: Abdomen is soft.      Tenderness: There is no abdominal tenderness.   Musculoskeletal:         General: No swelling.   Skin:     General: Skin is warm and dry.   Neurological:      Mental Status: She is alert and oriented to person, place, and time. Mental status is at baseline.             Significant Labs: All pertinent labs within the past 24 hours have been reviewed.  Recent Lab Results         01/21/25  0920   01/21/25  0645        Anion Gap   14       Baso # 0.01         Basophil % 0.1         BUN   59       Calcium   9.4       Chloride   105       CO2   17       Creatinine   1.4       Differential Method Automated         eGFR   38       Eos # 0.1         Eos % 1.7         Glucose   68       Gran # (ANC) 4.6         Gran % 64.8         Hematocrit 29.0         Hemoglobin 9.0         Immature Grans (Abs)  0.19  Comment: Mild elevation in immature granulocytes is non specific and   can be seen in a variety of conditions including stress response,   acute inflammation, trauma and pregnancy. Correlation with other   laboratory and clinical findings is essential.           Immature Granulocytes 2.6         Lymph # 1.5         Lymph % 21.5         Magnesium    2.2       MCH 30.9         MCHC 31.0                  Mono # 0.7         Mono % 9.3         MPV 10.6         nRBC 0         Phosphorus Level   4.7       Platelet Estimate Appears normal         Platelet Count 195  Comment: Reviewed by Technologist.         Potassium   5.4       RBC 2.91         RDW 14.5         Sodium   136       WBC 7.17                 Significant Imaging: I have reviewed all pertinent imaging results/findings within the past 24 hours.

## 2025-01-21 NOTE — PROGRESS NOTES
Pharmacist Renal Dose Adjustment Note    Shireen Felix is a 79 y.o. female being treated with the medication cefepime.     Patient Data:    Vital Signs (Most Recent):  Temp: 98.6 °F (37 °C) (01/20/25 2017)  Pulse: 74 (01/21/25 0028)  Resp: 19 (01/21/25 0028)  BP: (!) 154/70 (01/21/25 0028)  SpO2: 98 % (01/21/25 0028) Vital Signs (72h Range):  Temp:  [46.6 °F (8.1 °C)-98.6 °F (37 °C)]   Pulse:  []   Resp:  [15-20]   BP: (109-172)/(55-93)   SpO2:  [90 %-100 %]      Recent Labs   Lab 01/19/25  0453 01/19/25  1353 01/20/25  0645   CREATININE 1.2 1.2 1.3     Serum creatinine: 1.3 mg/dL 01/20/25 0645  Estimated creatinine clearance: 29.5 mL/min    Medication: cefepime 2 g IV every 12 hours will be changed to cefepime 1 g IV every 12 hours per pharmacy renal dose adjustment protocol for patients with severe infections and CrCl 10-30 mL/min.    Pharmacist's Name: Margot Stoner PharmD  Pharmacist's Extension: [phone]     Thank you for allowing us to participate in this patient's care.     Margot Stoner PharmD 01/21/2025 2:00 AM

## 2025-01-21 NOTE — ASSESSMENT & PLAN NOTE
Hyperkalemia is likely due to  metabolic acidosis .The patients most recent potassium results are listed below.  Recent Labs     01/19/25  1353 01/20/25  0645 01/21/25  0645   K 5.2* 6.0* 5.4*       Plan  - Monitor for arrhythmias with EKG and/or continuous telemetry.   - Treat the hyperkalemia with Potassium Binders, Furosemide, and Sodium Bicarbonate.   - Monitor potassium: Daily  - The patient's hyperkalemia is improving

## 2025-01-21 NOTE — PLAN OF CARE
A223/A223 OLU Felix is a 79 y.o.female admitted on 12/31/2024 for UIP (usual interstitial pneumonitis)   Code Status: DNR MRN: [id number]   Review of patient's allergies indicates:   Allergen Reactions    Penicillins Anaphylaxis, Hives, Shortness Of Breath and Swelling    Penicillin     Adhesive Rash    Doxycycline Nausea Only    Oxycodone Other (See Comments)     Fatigue      Sulfa (sulfonamide antibiotics) Itching     Past Medical History:   Diagnosis Date    Abnormal ECG 08/05/2019    Acute bronchitis and bronchiolitis 12/31/2024    Aneurysm     Anticoagulant long-term use     Arthritis     CAD in native artery 08/05/2019    Diabetes mellitus     Fall 10/10/2019    Formatting of this note might be different from the original. Found sitting on floor next to bed last night Mild confusion today Does not recall falling or how she ended up on floor UA today Labs yesterday unremarkable    Glaucoma     Hypertension     Pulmonary fibrosis     Seizures     Shingles 05/27/2017    Stroke       PRN meds    acetaminophen, 650 mg, Q6H PRN  ALPRAZolam, 0.25 mg, TID PRN  dextrose 50%, 12.5 g, PRN  dextrose 50%, 25 g, PRN  glucagon (human recombinant), 1 mg, PRN  glucose, 16 g, PRN  glucose, 24 g, PRN  ipratropium, 0.5 mg, Q4H PRN  magic mouthwash (diphenhydrAMINE 12.5 mg/5 mL 20 mL, aluminum & magnesium hydroxide-simethicone (MYLANTA) 20 mL, LIDOcaine HCl 2% (XYLOCAINE) 20 mL) solution, 10 mL, Q4H PRN  melatonin, 6 mg, Nightly PRN  methocarbamoL, 500 mg, TID PRN  morphine, 2 mg, Q4H PRN  ondansetron, 4 mg, Q8H PRN  promethazine-codeine 6.25-10 mg/5 ml, 5 mL, Q4H PRN  sodium chloride 0.9%, 10 mL, PRN      Chart check completed. Will continue plan of care.      Orientation: oriented x 4  [place] Coma Scale Score: 15     Lead Monitored: Lead II Rhythm: normal sinus rhythm Frequency/Ectopy: PVCs  Cardiac/Telemetry Box Number: [id number]  VTE Core Measure: Pharmacological prophylaxis initiated/maintained Last Bowel Movement:  01/17/25  Diet Cardiac Low Potassium; Isolation Tray - Styrofoam  Voiding Characteristics: incontinence  Gilberto Score: 15  Fall Risk Score: 19  Accucheck []   Freq?      Lines/Drains/Airways       Peripheral Intravenous Line  Duration                  Midline Catheter - Single Lumen 01/17/25 1925 Left basilic vein (medial side of arm) other (see comments) 2 days

## 2025-01-21 NOTE — PROGRESS NOTES
[facility] Medicine  Progress Note    Patient Name: Shireen Felix  MRN: [id number]  Patient Class: IP- Inpatient   Admission Date: 12/31/2024  Length of Stay: 20 days  Attending Physician: Yoselin Rodriguez MD  Primary Care Provider: Laron Chaudhari MD        Subjective     Principal Problem:UIP (usual interstitial pneumonitis)        HPI:  Shireen Felix is a 79 year old female who has  has a past medical history of Abnormal ECG, Acute bronchitis and bronchiolitis, Aneurysm, Anticoagulant long-term use, Arthritis, CAD in native artery, COPD (chronic obstructive pulmonary disease), Diabetes mellitus, Fall, Glaucoma, Hypertension, Seizures, Shingles, and Stroke who presented to Emergency Department for evaluation for cough. Associated symptoms include: congestion, wheezing, and SOB. She was seen by her pulmonologist today, Dr. Sam. Started as sinus pain and drainage and now has progressed to respiratory symptoms. She was seen a few days ago in the ER given prescription for antibiotics steroids but these have not helped much. Today in the office she can barely talk without coughing continually. She is having a difficult time putting more than 3 words together without severe cough paroxysms. ED workup showed Hgb/Hct 11.9/37.0, K+ 3.1, CO2 16, BUN/creatinine 34/1.5. CT chest w/out contrast stable suspected pulmonary fibrosis of the lungs. No acute infiltrates. She has received DuoNeb x 1, solumedrol 125 mg IV x 1, KCL 40 mEq PO x 1 and NS 1L bolus. Pt admitted for COVID.     Overview/Hospital Course:  Admitted to Hospital Medicine for acute on chronic respiratory failure concerns in setting of COVID-19.  Started on IV methylprednisolone, remdesivir, scheduled respiratory treatments. 01/04: Reported left lower quadrant abdominal pain with tenderness to palpation, will evaluate via CT as patient is on therapeutic anticoagulation for COVID-19.  CT abdomen/pelvis nonacute.  01/05:  Reported improvements in respiratory status with decreased frequency of cough and dyspnea but still reporting significant symptoms from baseline.  On 1/6, patient had a significant event with acute tachypnea, tachycardia. Pulmonology consulted, orders adjusted. Patient stable overnight, had another event on 1/7 similar to previous day. Possible exacerbation related to anxiety provoked by medications and congestion. Case reviewed, orders adjusted. Per Pulmonology, due to underlying lung fibrosis, patient is likely not benefiting from bronchodilators. Stopped on 1/7, patient did not experience any further episodes of anxiety, respiratory distress. Will continue xanax and prn morphine. Patient with epistaxis, small clots in tissue noted. Reduced supplemental O2 to 1L at rest, stopped full dose lovenox and placed on ppx dose. Hgb stable.   Difficulty with discharge plan, patient originally planned to d/c to SNF, now prefers to d/c home.  Pulmonology discussed home palliative care vs hospice.  Patient and family wish to discuss the Services further.  Per discussion with patient, family will be available for an informational visit on Monday.  Educated patient on results of oxygen evaluation, advised patient to reduce oxygen to 2 L or less at rest, requires 4 L with exertion.  As clinically indicated if significant improvement noted will repeat oxygen evaluation.     01/12/2025  O2 sats stable on 2L (while at rest). Patient and family wants to try and go home, but unable to participate with PT over weekend. Repeat desat study on Monday. May have to consider palliative measures if progress plateaus.      01/13/2025  Feeling slightly better today than she felt a few days ago but overall still significantly short of breath with exertion.  Discussed with her, palliative Care, and her family.  She does not want to change to hospice at this time.  She wants to try to get better.  She does want to be DNR though.   Appreciate further palliative care involvement.  Still desaturating significantly with exertion and unable to work with PT because of this    01/14/2025  Feeling perhaps slightly better today, attempting to work with physical therapy.  We are hoping that we could either get her to rehab if her oxygen requirements with exertion go down, or potentially get a bigger concentrator at home for increasing her up to 10 L with exertion at home.  Otherwise no changes to the plan    01/15/2025:  Still having coughing spells but notably improved.  Oxygen is being down titrated and she even got a room air today.  We are screening her for rehabs    01/16/2025:  Coughing she states is much worse when oxygen is off.  She was on room air but I put her on 1 L to see if there is truly an effect.  She is still being screened for rehabs.  Overall she looks much better.  She did technically have a fever today to 101.4 but when I evaluate her she looks notably better, CXR is negative for acute change, would not recommend starting antibiotics and she has defervesced    1/17/25: Somnolent this AM, spiking more fevers yesterday evening, and mildly soft BP this AM. Lactate wnl. Alert and oriented on my eval. CXR with ? Infiltrate, more cough, feeling worse. Will start ABX for PNA, blood cultures, sputum culture    01/18/2025: Significant epistaxis and hemoptysis overnight.  CT with and without contrast did not reveal a source of bleed.  Still coughing with hemoptysis this morning.  On exam with obvious epistaxis, packed by ENT, resolved.  Coughing has improved.  Currently on Ventimask to avoid nasal cannula     01/19/2025: Doing better today, no more hemoptysis, less cough, no more epistaxis, hemoglobin up slightly.  Still on Ventimask to avoid nasal cannula but only requiring minimal quantities of oxygen, can take it off to eat.  Listed precautions yesterday evening for COVID given has been more than 10 days since diagnosis    1/20/25:  reporting abdominal pain when coughing. States she reported yesterday as well.     1/21/25 patient reports she is feeling better. Abdominal pain with movement. Audible wheezing noted. Mook added    Interval History: f/u respiratory failure  audibly wheezing this morning. Reports she is feeling better    Review of Systems  Objective:     Vital Signs (Most Recent):  Temp: 97.7 °F (36.5 °C) (01/21/25 0731)  Pulse: 80 (01/21/25 0853)  Resp: 20 (01/21/25 0853)  BP: (!) 165/73 (01/21/25 0731)  SpO2: 99 % (01/21/25 0853) Vital Signs (24h Range):  Temp:  [97.4 °F (36.3 °C)-98.6 °F (37 °C)] 97.7 °F (36.5 °C)  Pulse:  [] 80  Resp:  [16-20] 20  SpO2:  [96 %-100 %] 99 %  BP: (112-172)/(55-93) 165/73     Weight: 54.2 kg (119 lb 7.8 oz)  Body mass index is 20.51 kg/m².    Intake/Output Summary (Last 24 hours) at 1/21/2025 1059  Last data filed at 1/20/2025 1300  Gross per 24 hour   Intake 240 ml   Output --   Net 240 ml         Physical Exam  HENT:      Head: Normocephalic and atraumatic.   Cardiovascular:      Rate and Rhythm: Normal rate and regular rhythm.      Heart sounds: No murmur heard.  Pulmonary:      Effort: Pulmonary effort is normal. No respiratory distress.      Breath sounds: Wheezing present.   Abdominal:      General: Bowel sounds are normal. There is no distension.      Palpations: Abdomen is soft.      Tenderness: There is no abdominal tenderness.   Musculoskeletal:         General: No swelling.   Skin:     General: Skin is warm and dry.   Neurological:      Mental Status: She is alert and oriented to person, place, and time. Mental status is at baseline.             Significant Labs: All pertinent labs within the past 24 hours have been reviewed.  Recent Lab Results         01/21/25  0920   01/21/25  0645        Anion Gap   14       Baso # 0.01         Basophil % 0.1         BUN   59       Calcium   9.4       Chloride   105       CO2   17       Creatinine   1.4       Differential Method Automated          eGFR   38       Eos # 0.1         Eos % 1.7         Glucose   68       Gran # (ANC) 4.6         Gran % 64.8         Hematocrit 29.0         Hemoglobin 9.0         Immature Grans (Abs) 0.19  Comment: Mild elevation in immature granulocytes is non specific and   can be seen in a variety of conditions including stress response,   acute inflammation, trauma and pregnancy. Correlation with other   laboratory and clinical findings is essential.           Immature Granulocytes 2.6         Lymph # 1.5         Lymph % 21.5         Magnesium    2.2       MCH 30.9         MCHC 31.0                  Mono # 0.7         Mono % 9.3         MPV 10.6         nRBC 0         Phosphorus Level   4.7       Platelet Estimate Appears normal         Platelet Count 195  Comment: Reviewed by Technologist.         Potassium   5.4       RBC 2.91         RDW 14.5         Sodium   136       WBC 7.17                 Significant Imaging: I have reviewed all pertinent imaging results/findings within the past 24 hours.    Assessment and Plan     * UIP (usual interstitial pneumonitis)  Pulm fibrosis hx- followed by pulmonology outpatient  Likely had flare of pulmonary fibrosis secondary to the COVID infection  This likely explains her continued symptoms, hypoxia, cough, etc.   We had been initially discussing whether patient would need to transitioned to hospice as she was requiring up to 10 L  Oxygen requirement has improved significantly, now on Ventimask only to avoid nasal cannula, but truly requires only about 1 L at this time         Hyperkalemia  Hyperkalemia is likely due to  metabolic acidosis .The patients most recent potassium results are listed below.  Recent Labs     01/19/25  1353 01/20/25  0645 01/21/25  0645   K 5.2* 6.0* 5.4*       Plan  - Monitor for arrhythmias with EKG and/or continuous telemetry.   - Treat the hyperkalemia with Potassium Binders, Furosemide, and Sodium Bicarbonate.   - Monitor potassium: Daily  - The  patient's hyperkalemia is improving            Atrial fibrillation  Patient with paroxysmal (<7 days) atrial fibrillation which is {:controlled currently without medication. Patient is currently in sinus rhythm.[id number] Score: 5. HASBLED Score: . Anticoagulation Patient is not anticoagulated due to bleeding risk .           Nasal septal deviation        Hemoptysis  Likely secondary to the epistaxis.  CT with and without contrast negative.  On TXA nebs but if no further hemoptysis may be able to stop soon.  Likely exacerbated by the coughing so adding cough syrup with codeine today in addition to her other antitussives. Improved significantly after addressing the epistaxis 01/18/2025      Hospital acquired PNA  -continue cefepime day 5/7    Encounter for palliative care  Palliative Care is following.  Receiving hydrocodone/acetaminophen p.r.n., morphine p.r.n., alprazolam p.r.n. for respiratory distress with good effect      Epistaxis  Likely secondary to supplemental O2, septal defect.  ENT packed the nose 1/18/25 and it seems to have resolved.  I think this is the cause of hemoptysis.  Pulm saw the patient as well and she is on tranexamic acid nebs although we do not feel the bleeding is coming from the lungs.  CT with and without contrast did not reveal a source of bleeding.  Hemoglobin has been dropping over the past few days but is not increasing after addressing the epistaxis.    -Mucomyst nasal spray to keep the septum moist    Acute on chronic respiratory failure with hypoxia  Patient with Hypoxic Respiratory failure which is Acute on chronic.  she is not on home oxygen. Supplemental oxygen was provided and noted- Oxygen Concentration (%):  [28] 28    .   Signs/symptoms of respiratory failure include- tachypnea, increased work of breathing, respiratory distress, use of accessory muscles, wheezing, stridor, and lethargy. Contributing diagnoses includes - Interstitial lung disease Labs and images were  reviewed. Patient Has recent ABG, which has been reviewed. Will treat underlying causes and adjust management of respiratory failure as follows-     Home Oxygen evaluation performed:  Home Oxygen Evaluation - [facility] - Cardiopulmonary Department      Date Performed: 1/8/2025     1)Patient's Home O2 Sat on room air, while at rest: Room Air SpO2 At Rest: 99 %                             If O2 sats on room air at rest are 88% or below, patient qualifies.  Document O2 liter flow needed in Step 2.  If O2 sats are 89% or above, complete Step 3.        2)         If patient is not ambulated and O2 sats are <88%, what is the O2 liter flow required to meet ordered saturation? Home O2 Eval Comments: pt qualifies for home oxygen     If O2 sats on room air while exercising remain 89% or above patient does not qualify, no further testing needed Document N/A in step 3. If O2 sats on room air while exercising are 88% or below, continue to Step 4.     3)Patient's O2 Sat on room air while exercising: Room Air SpO2 During Ambulation: (!) 78 %                             4)Patient's O2 Sat while exercising on O2: SpO2 During Ambulation on O2: 96 % at Ambulation O2 LPM: 4 LPM                             (Must show improvement from #4 for patients to qualify)    Patient qualifies for Home O2 @4L NC due to exertional dyspnea, hypoxia    Hypokalemia  Patient's most recent potassium results are listed below.   Recent Labs     01/08/25  0604 01/09/25  0508 01/10/25  0531   K 4.3 4.9 4.6       Plan  - Replete potassium per protocol  - Monitor potassium Daily  - Patient's hypokalemia is  being treated. Received KCL 40 mEq PO x 1 in ED  -  Stable    CKD (chronic kidney disease)  Creatine stable for now. BMP reviewed- noted Estimated Creatinine Clearance: 27.4 mL/min (based on SCr of 1.4 mg/dL). according to latest data. Based on current GFR, CKD stage is stage 4 - GFR 15-29.  Monitor UOP and serial BMP and adjust therapy as needed.  "Renally dose meds. Avoid nephrotoxic medications and procedures.  -Creatinine 1.5 on admission however 3 months ago was 1.2    Recent Labs     01/19/25  1353 01/20/25  0645 01/21/25  0645   CREATININE 1.2 1.3 1.4         -Due to GFR, will need to dc ranexa for now  -Holding spironolactone, entresto  -Monitor creatinine      Chronic systolic heart failure  Patient has Diastolic (HFpEF) heart failure that is Chronic. On presentation their CHF was well compensated. Most recent BNP and echo results are listed below.  No results for input(s): "BNP" in the last 72 hours.    Latest ECHO  Results for orders placed during the hospital encounter of 06/02/23    Echo    Interpretation Summary  · The left ventricle is normal in size with concentric hypertrophy and low normal systolic function.  · The estimated ejection fraction is 50%.  · Normal left ventricular diastolic function.  · There is abnormal septal wall motion consistent with left bundle branch block.  · Normal right ventricular size with normal right ventricular systolic function.  · Normal central venous pressure (3 mmHg).  · The estimated PA systolic pressure is 30 mmHg.  · Mild mitral regurgitation.    Current Heart Failure Medications  furosemide tablet 20 mg, Daily, Oral    Plan  - Monitor strict I&Os and daily weights.    - Place on telemetry  - Low sodium diet  - Place on fluid restriction of 1.5 L.   - Cardiology has not been consulted  - The patient's volume status is at their baseline  -         COVID-19  ** initially COVID positive on 12/31 on admission **   Patient is identified as Severe COVID-19 based on hypoxemia with O2 saturations <94% on room air or on ambulation   Initiated standard COVID protocols; COVID-19 testing ,Infection Control notification  and isolation- respiratory, contact and droplet per protocol    Management: Treated with targetedtherapy with Remdesivir, 200mg IV x1, followed by 100mg IV daily x5 days total and Anticoagulation, " Patient admitted to non-critical care unit- Will initiate full dose anticoagulation with Weight based lovenox 1mg/kg IV q12, stopped on 1/11/25, Maintain oxygen saturations 92-96% via Nasal Cannula  and monitor with continuous/intermittent pulse oximetry. , and Continuous cardiac monitoring.    As of 01/18/2025 DC'd  precautions as it has been more than 10 days since onset, and her persistent symptoms are likely a flare of her interstitial lung disease/postviral pneumonia     Mixed hyperlipidemia  Lab Results   Component Value Date    CHOL 112 (L) 07/12/2024    HDL 41 07/12/2024    LDLCALC 49.8 (L) 07/12/2024    TRIG 106 07/12/2024     -Continue Statin    Fall  Fall night of 1/15 with head strike   Evaluated by nighttime hospitalist   Head CT was negative   Neuro exam on 01/16 is normal   Fall precautions  Continue therapy      Normocytic anemia  Anemia is likely due to chronic disease due to Chronic Kidney Disease. Most recent hemoglobin and hematocrit are listed below.  Recent Labs     01/19/25  0453 01/20/25  0645 01/21/25  0920   HGB 9.2* 8.1* 9.0*   HCT 30.4* 27.4* 29.0*       Plan  - Monitor serial CBC: Daily  - Transfuse PRBC if patient becomes hemodynamically unstable, symptomatic or H/H drops below 7/21.  - Patient has not received any PRBC transfusions to date  - Patient's anemia is currently stable      Seizure  Hx of seizures, patient has not had a seizure during this admission.   At home is on seizure medication Aptiom 400mg daily and Keppra 1,000mg BID  Aptiom likely worsening hyponatremia and also inhibiting SNF placement due to cost, so discussed with neuro on call  Per neuro on call, would DC Aptiom and increase Keppra to 1,500mg BID, change made  Can follow up with neuro as outpatient to revisit        Coronary artery disease of native artery of native heart with stable angina pectoris  Patient with known CAD  No anginal signs or symptoms   On Plavix and statin as an outpatient   Holding Plavix  currently for the epistaxis     COPD exacerbation  Scheduled Duonebs  Continue oral and inhaled steroids  LABA    GERD (gastroesophageal reflux disease)  -Continue PPI      Nasal septal perforation  Was likely contributing to the epistaxis   Seen by ENT   Packing in place   No further bleeding   Mucomyst nasal spray to the septal defect especially on the right side where it and this especially dry and crusted to try to prevent further bleeding        VTE Risk Mitigation (From admission, onward)      None            Discharge Planning   KATHERINE:      Code Status: DNR   Medical Readiness for Discharge Date:   Discharge Plan A: Skilled Nursing Facility   Discharge Delays: None known at this time            Please place Justification for DME        Yoselin Rodriguez MD  Department of Hospital Medicine   [facility] - Telemetry ([facility])

## 2025-01-22 PROBLEM — E87.6 HYPOKALEMIA: Status: RESOLVED | Noted: 2023-06-03 | Resolved: 2025-01-22

## 2025-01-22 LAB
ANION GAP SERPL CALC-SCNC: 11 MMOL/L (ref 8–16)
BACTERIA BLD CULT: NORMAL
BACTERIA BLD CULT: NORMAL
BASOPHILS NFR BLD: 0 % (ref 0–1.9)
BUN SERPL-MCNC: 56 MG/DL (ref 8–23)
CALCIUM SERPL-MCNC: 9.1 MG/DL (ref 8.7–10.5)
CHLORIDE SERPL-SCNC: 106 MMOL/L (ref 95–110)
CO2 SERPL-SCNC: 19 MMOL/L (ref 23–29)
CREAT SERPL-MCNC: 1.4 MG/DL (ref 0.5–1.4)
DIFFERENTIAL METHOD BLD: ABNORMAL
EOSINOPHIL NFR BLD: 1 % (ref 0–8)
ERYTHROCYTE [DISTWIDTH] IN BLOOD BY AUTOMATED COUNT: 14.6 % (ref 11.5–14.5)
EST. GFR  (NO RACE VARIABLE): 38 ML/MIN/1.73 M^2
GLUCOSE SERPL-MCNC: 116 MG/DL (ref 70–110)
HCT VFR BLD AUTO: 29.3 % (ref 37–48.5)
HGB BLD-MCNC: 8.8 G/DL (ref 12–16)
IMM GRANULOCYTES # BLD AUTO: ABNORMAL K/UL (ref 0–0.04)
IMM GRANULOCYTES NFR BLD AUTO: ABNORMAL % (ref 0–0.5)
LYMPHOCYTES NFR BLD: 8 % (ref 18–48)
MAGNESIUM SERPL-MCNC: 2.1 MG/DL (ref 1.6–2.6)
MCH RBC QN AUTO: 30.2 PG (ref 27–31)
MCHC RBC AUTO-ENTMCNC: 30 G/DL (ref 32–36)
MCV RBC AUTO: 101 FL (ref 82–98)
METAMYELOCYTES NFR BLD MANUAL: 4 %
MONOCYTES NFR BLD: 11 % (ref 4–15)
MYELOCYTES NFR BLD MANUAL: 3 %
NEUTROPHILS NFR BLD: 72 % (ref 38–73)
NEUTS BAND NFR BLD MANUAL: 1 %
NRBC BLD-RTO: 0 /100 WBC
OVALOCYTES BLD QL SMEAR: ABNORMAL
PHOSPHATE SERPL-MCNC: 4.2 MG/DL (ref 2.7–4.5)
PLATELET # BLD AUTO: 209 K/UL (ref 150–450)
PLATELET BLD QL SMEAR: ABNORMAL
PMV BLD AUTO: 10.5 FL (ref 9.2–12.9)
POTASSIUM SERPL-SCNC: 4.5 MMOL/L (ref 3.5–5.1)
RBC # BLD AUTO: 2.91 M/UL (ref 4–5.4)
SODIUM SERPL-SCNC: 136 MMOL/L (ref 136–145)
WBC # BLD AUTO: 6.44 K/UL (ref 3.9–12.7)

## 2025-01-22 PROCEDURE — 21400001 HC TELEMETRY ROOM

## 2025-01-22 PROCEDURE — 85027 COMPLETE CBC AUTOMATED: CPT | Performed by: STUDENT IN AN ORGANIZED HEALTH CARE EDUCATION/TRAINING PROGRAM

## 2025-01-22 PROCEDURE — 25000003 PHARM REV CODE 250: Performed by: NURSE PRACTITIONER

## 2025-01-22 PROCEDURE — 85007 BL SMEAR W/DIFF WBC COUNT: CPT | Performed by: STUDENT IN AN ORGANIZED HEALTH CARE EDUCATION/TRAINING PROGRAM

## 2025-01-22 PROCEDURE — 25000242 PHARM REV CODE 250 ALT 637 W/ HCPCS: Performed by: STUDENT IN AN ORGANIZED HEALTH CARE EDUCATION/TRAINING PROGRAM

## 2025-01-22 PROCEDURE — 25000003 PHARM REV CODE 250: Performed by: STUDENT IN AN ORGANIZED HEALTH CARE EDUCATION/TRAINING PROGRAM

## 2025-01-22 PROCEDURE — 27000221 HC OXYGEN, UP TO 24 HOURS

## 2025-01-22 PROCEDURE — 25000003 PHARM REV CODE 250: Performed by: INTERNAL MEDICINE

## 2025-01-22 PROCEDURE — 25000242 PHARM REV CODE 250 ALT 637 W/ HCPCS: Performed by: INTERNAL MEDICINE

## 2025-01-22 PROCEDURE — 80048 BASIC METABOLIC PNL TOTAL CA: CPT | Performed by: STUDENT IN AN ORGANIZED HEALTH CARE EDUCATION/TRAINING PROGRAM

## 2025-01-22 PROCEDURE — 27100171 HC OXYGEN HIGH FLOW UP TO 24 HOURS

## 2025-01-22 PROCEDURE — 84100 ASSAY OF PHOSPHORUS: CPT | Performed by: STUDENT IN AN ORGANIZED HEALTH CARE EDUCATION/TRAINING PROGRAM

## 2025-01-22 PROCEDURE — 63600175 PHARM REV CODE 636 W HCPCS: Performed by: INTERNAL MEDICINE

## 2025-01-22 PROCEDURE — 94640 AIRWAY INHALATION TREATMENT: CPT

## 2025-01-22 PROCEDURE — 94761 N-INVAS EAR/PLS OXIMETRY MLT: CPT

## 2025-01-22 PROCEDURE — 83735 ASSAY OF MAGNESIUM: CPT | Performed by: STUDENT IN AN ORGANIZED HEALTH CARE EDUCATION/TRAINING PROGRAM

## 2025-01-22 PROCEDURE — 99900035 HC TECH TIME PER 15 MIN (STAT)

## 2025-01-22 RX ORDER — TIZANIDINE 4 MG/1
4 TABLET ORAL EVERY 12 HOURS
Qty: 60 TABLET | Refills: 2 | Status: SHIPPED | OUTPATIENT
Start: 2025-01-22

## 2025-01-22 RX ORDER — BISACODYL 5 MG
10 TABLET, DELAYED RELEASE (ENTERIC COATED) ORAL ONCE
Status: COMPLETED | OUTPATIENT
Start: 2025-01-22 | End: 2025-01-22

## 2025-01-22 RX ORDER — PREDNISONE 20 MG/1
20 TABLET ORAL DAILY
Status: DISCONTINUED | OUTPATIENT
Start: 2025-01-22 | End: 2025-01-28 | Stop reason: HOSPADM

## 2025-01-22 RX ADMIN — LEVETIRACETAM 1500 MG: 500 TABLET, FILM COATED ORAL at 09:01

## 2025-01-22 RX ADMIN — IPRATROPIUM BROMIDE AND ALBUTEROL SULFATE 3 ML: 2.5; .5 SOLUTION RESPIRATORY (INHALATION) at 01:01

## 2025-01-22 RX ADMIN — BENZONATATE 200 MG: 100 CAPSULE ORAL at 02:01

## 2025-01-22 RX ADMIN — ALPRAZOLAM 0.25 MG: 0.25 TABLET ORAL at 09:01

## 2025-01-22 RX ADMIN — CEFEPIME 1 G: 1 INJECTION, POWDER, FOR SOLUTION INTRAMUSCULAR; INTRAVENOUS at 12:01

## 2025-01-22 RX ADMIN — BENZONATATE 200 MG: 100 CAPSULE ORAL at 09:01

## 2025-01-22 RX ADMIN — OXYCODONE HYDROCHLORIDE AND ACETAMINOPHEN 500 MG: 500 TABLET ORAL at 09:01

## 2025-01-22 RX ADMIN — ARFORMOTEROL TARTRATE 15 MCG: 15 SOLUTION RESPIRATORY (INHALATION) at 07:01

## 2025-01-22 RX ADMIN — FUROSEMIDE 20 MG: 20 TABLET ORAL at 09:01

## 2025-01-22 RX ADMIN — BUDESONIDE INHALATION 0.5 MG: 0.5 SUSPENSION RESPIRATORY (INHALATION) at 08:01

## 2025-01-22 RX ADMIN — GUAIFENESIN 1200 MG: 600 TABLET, EXTENDED RELEASE ORAL at 09:01

## 2025-01-22 RX ADMIN — BUDESONIDE INHALATION 0.5 MG: 0.5 SUSPENSION RESPIRATORY (INHALATION) at 07:01

## 2025-01-22 RX ADMIN — IPRATROPIUM BROMIDE AND ALBUTEROL SULFATE 3 ML: 2.5; .5 SOLUTION RESPIRATORY (INHALATION) at 08:01

## 2025-01-22 RX ADMIN — ESCITALOPRAM OXALATE 20 MG: 10 TABLET, FILM COATED ORAL at 09:01

## 2025-01-22 RX ADMIN — PANTOPRAZOLE SODIUM 40 MG: 40 TABLET, DELAYED RELEASE ORAL at 09:01

## 2025-01-22 RX ADMIN — BISACODYL 10 MG: 5 TABLET, COATED ORAL at 12:01

## 2025-01-22 RX ADMIN — ARFORMOTEROL TARTRATE 15 MCG: 15 SOLUTION RESPIRATORY (INHALATION) at 08:01

## 2025-01-22 RX ADMIN — ATORVASTATIN CALCIUM 40 MG: 40 TABLET, FILM COATED ORAL at 09:01

## 2025-01-22 RX ADMIN — SALINE NASAL SPRAY 1 SPRAY: 1.5 SOLUTION NASAL at 09:01

## 2025-01-22 RX ADMIN — THERA TABS 1 TABLET: TAB at 09:01

## 2025-01-22 RX ADMIN — MONTELUKAST 10 MG: 10 TABLET, FILM COATED ORAL at 09:01

## 2025-01-22 RX ADMIN — GABAPENTIN 300 MG: 300 CAPSULE ORAL at 09:01

## 2025-01-22 RX ADMIN — GUAIFENESIN AND DEXTROMETHORPHAN HYDROBROMIDE 2 TABLET: 600; 30 TABLET, EXTENDED RELEASE ORAL at 09:01

## 2025-01-22 RX ADMIN — POLYETHYLENE GLYCOL 3350 17 G: 17 POWDER, FOR SOLUTION ORAL at 09:01

## 2025-01-22 RX ADMIN — PREDNISONE 20 MG: 20 TABLET ORAL at 12:01

## 2025-01-22 RX ADMIN — SALINE NASAL SPRAY 1 SPRAY: 1.5 SOLUTION NASAL at 02:01

## 2025-01-22 RX ADMIN — IPRATROPIUM BROMIDE AND ALBUTEROL SULFATE 3 ML: 2.5; .5 SOLUTION RESPIRATORY (INHALATION) at 07:01

## 2025-01-22 NOTE — PROGRESS NOTES
[facility] - Telemetry ([facility])  Adult Nutrition  Progress Note    SUMMARY       Recommendations    Recommendation/Intervention:   1. Recommend pt continues on a Cardiac, low potassium diet  2. Recommend pt continues on Suplena TID to assist filling nutritional gaps   3. Encourage PO and supplement intake, recommend feeding assistance as warranted   4. Weigh twice weekly    Goals:   1. Diet will be modified to safest texture within 24 hrs if warranted (met)  2. Pt will tolerate and consume >75% EEN and EPN prior to RD follow up (progressing)   Nutrition Goal Status: active  Communication of RD Recs: other (comment) (POC, sticky note)    Assessment and Plan    Nutrition Problem  Underweight  Inadequate energy intake   Decreased nutrient needs (protein)    Related to (etiology):   Increased demand for nutrition   Decreased ability to consume sufficient energy   CKD    Signs and Symptoms (as evidenced by):   BMI < 22 (older adult)  SOB, Estimated intake of food less than estimated needs   Current ONS exceeds protein needs     Interventions/Recommendations (treatment strategy):  1. Decreased sodium and fat, texture modified diet   2. Management of commercial beverage medical food supplement therapy  3. Feeding assistance management   4. Collaboration by nutrition professional with other providers    Nutrition Diagnosis Status:   Continues      Malnutrition Assessment  Malnutrition Level: other (see comments) (unable to determine)  Skin (Micronutrient): bruised (Gilberto score = 18 (mild risk)                               Reason for Assessment    Reason For Assessment: RD follow-up  Diagnosis:  (COVID-19)  General Information Comments:   1/9/25: Dietitian working remotely. Attempted to reach pt by phone without success. Currently on cardiac diet. Intake % of most meals documented in flowsheets. LBM 1/7. Weight recordings ranging from 108-122 lb this admit. No edema noted. Chart/review past records suggest weight gain  "over the last few months but weight loss in the first few months of 2024. Will need to clarify weight history with pt at f/u visit.     Follow-Up:   1/14/25: 79 y.o. Female admitted for COVID-19. PMH: GERD, UIP, CAD, Seizure, Normocytic anemia, HLD, Chronic systolic HF, CKD, Acute on chronic respiatory failure w/ hypoxia, Epistaxis; Hx: Stroke. Pt is currently on isolation and a Cardiac diet + Boost plus TID. RD follow up. EMR noted pt is doing better, pt still significantly SOB with exertion, pt denied hospice at this time, pt's goal is to "try to get better", pt negative for N/V/D/C. Spoke to RN via secure chat, confirmed pt is drinking Boost plus. Note Gt ordered but no wounds noted, RD d/c'd from pt's orders and trays, also modified ONS to Suplena to assist filling nutritional gaps but within protein needs for CKD. Per chart: 100% breakfast, 0% lunch PO intake today (1/14), LBM 1/12, GI WDL. Labs, meds, weights reviewed. Weight charted 1/7/25 112 lbs, 1/14/25 111 lbs (BMI 19.22, Underweight for age), wt stable. Unable to perform NFPE d/t pt's isolation status, will perform when appropriate. RD will continue to follow and monitor pt's nutritional status during admit.Nutrition Discharge Planning: Cardiac diet, texture per SLP recommendations + Boost plus as warranted    1/17/25: Dietitian working remotely. Per EMR flowsheets, pt ate 0% of her breakfast this morning however documented intakes have ranged from 0-100% this admit. Pt with 68% avg PO intake of meals x3 days.     1/22/25: Dietitian working remotely. Attempted to reach pt by phone without success. Currently on cardiac, low potassium diet. Variable intake % of meals since last RD assessment per flowsheet documentation. Receiving Suplena for additional kcal. Spoke with nursing via secure chat - reports pt has been eating 50-75% of meals and is drinking supplements. Last documented BM 1/18. Has been on MiraLax. Current weight suggests 11 lb weight " "gain since admit. No edema documented.    Nutrition/Diet History    Spiritual, Cultural Beliefs, Sikh Practices, Values that Affect Care: no  Food Allergies: NKFA  Factors Affecting Nutritional Intake: other (see comments) (SOB)    Nutrition Related Social Determinants of Health: SDOH: Unable to assess at this time.     Anthropometrics    Height: 5' 4" (162.6 cm)  Height (inches): 64 in  Height Method: Stated  Weight: 54.2 kg (119 lb 7.8 oz)  Weight (lb): 119.49 lb  Weight Method: Bed Scale  Ideal Body Weight (IBW), Female: 120 lb  % Ideal Body Weight, Female (lb): 93.33 %  BMI (Calculated): 20.5  BMI Grade: 18.5-24.9 - normal (Underweight for age)       Wt Readings from Last 15 Encounters:   01/19/25 54.2 kg (119 lb 7.8 oz)   12/31/24 49.2 kg (108 lb 7.5 oz)   12/05/24 49.2 kg (108 lb 7.5 oz)   11/21/24 48.3 kg (106 lb 7.7 oz)   09/04/24 46.4 kg (102 lb 4.7 oz)   09/04/24 47.7 kg (105 lb 4.3 oz)   08/01/24 45.8 kg (101 lb)   07/17/24 46.5 kg (102 lb 8.2 oz)   05/27/24 48.1 kg (106 lb)   05/23/24 48.1 kg (106 lb 0.7 oz)   05/22/24 47.6 kg (104 lb 15 oz)   05/17/24 47.4 kg (104 lb 8 oz)   05/08/24 47.4 kg (104 lb 8 oz)   05/08/24 47.4 kg (104 lb 8 oz)   05/08/24 47.4 kg (104 lb 8 oz)     Lab/Procedures/Meds    Pertinent Labs Reviewed: reviewed  Pertinent Medications Reviewed: reviewed  Pertinent Medications Comments: 500 mg/day vitamin C, MTV, polyethylene glycol    BMP  Lab Results   Component Value Date     01/22/2025    K 4.5 01/22/2025     01/22/2025    CO2 19 (L) 01/22/2025    BUN 56 (H) 01/22/2025    CREATININE 1.4 01/22/2025    CALCIUM 9.1 01/22/2025    ANIONGAP 11 01/22/2025    EGFRNORACEVR 38 (A) 01/22/2025     Lab Results   Component Value Date    CALCIUM 9.1 01/22/2025    PHOS 4.2 01/22/2025     Lab Results   Component Value Date    ALBUMIN 2.1 (L) 01/18/2025     Lab Results   Component Value Date    ALT 23 01/18/2025    AST 23 01/18/2025    ALKPHOS 44 01/18/2025    BILITOT 0.2 01/18/2025 " "    No results for input(s): "POCTGLUCOSE" in the last 24 hours.    Lab Results   Component Value Date    HGBA1C 4.9 07/12/2024     Lab Results   Component Value Date    WBC 6.44 01/22/2025    HGB 8.8 (L) 01/22/2025    HCT 29.3 (L) 01/22/2025     (H) 01/22/2025     01/22/2025       Scheduled Meds:   albuterol-ipratropium  3 mL Nebulization Q6H    arformoteroL  15 mcg Nebulization BID    ascorbic acid (vitamin C)  500 mg Oral BID    atorvastatin  40 mg Oral Daily    benzonatate  200 mg Oral TID    budesonide  0.5 mg Nebulization Q12H    ceFEPime IV (PEDS and ADULTS)  1 g Intravenous Q12H    dextromethorphan-guaiFENesin  mg  2 tablet Oral BID    EScitalopram oxalate  20 mg Oral Daily    furosemide  20 mg Oral Daily    gabapentin  300 mg Oral BID    levETIRAcetam  1,500 mg Oral BID    montelukast  10 mg Oral Daily    multivitamin  1 tablet Oral Daily    pantoprazole  40 mg Oral BID    polyethylene glycol  17 g Oral Daily    predniSONE  20 mg Oral Daily    sodium chloride  1 spray Each Nostril TID     Continuous Infusions:  PRN Meds:.  Current Facility-Administered Medications:     acetaminophen, 650 mg, Oral, Q6H PRN    ALPRAZolam, 0.25 mg, Oral, TID PRN    dextrose 50%, 12.5 g, Intravenous, PRN    dextrose 50%, 25 g, Intravenous, PRN    glucagon (human recombinant), 1 mg, Intramuscular, PRN    glucose, 16 g, Oral, PRN    glucose, 24 g, Oral, PRN    ipratropium, 0.5 mg, Nebulization, Q4H PRN    magic mouthwash (diphenhydrAMINE 12.5 mg/5 mL 20 mL, aluminum & magnesium hydroxide-simethicone (MYLANTA) 20 mL, LIDOcaine HCl 2% (XYLOCAINE) 20 mL) solution, 10 mL, Swish & Spit, Q4H PRN    melatonin, 6 mg, Oral, Nightly PRN    methocarbamoL, 500 mg, Oral, TID PRN    morphine, 2 mg, Intravenous, Q4H PRN    ondansetron, 4 mg, Intravenous, Q8H PRN    promethazine-codeine 6.25-10 mg/5 ml, 5 mL, Oral, Q4H PRN    sodium chloride 0.9%, 10 mL, Intravenous, PRN      Estimated/Assessed Needs    Weight Used For " Calorie Calculations: 54.2 kg (119 lb 7.8 oz)  Energy Calorie Requirements (kcal): 2538-1579 kcal (30-35 kcal/kg)  Energy Need Method: Kcal/kg  Protein Requirements: 40-50 g (0.8 g/kg) - GFR 38 on Lasix  Weight Used For Protein Calculations: 54.2 kg (119 lb 7.8 oz)  Fluid Requirements (mL): 1626 mL (30 mL/kg) or per MD  Estimated Fluid Requirement Method: RDA Method  RDA Method (mL): 1626  CHO Requirement: 203-237 g (50% estimated needs)      Nutrition Prescription Ordered    Current Diet Order: Cardiac, low potassium diet  Oral Nutrition Supplement: Suplena TID    Evaluation of Received Nutrient/Fluid Intake  I/O: (Net since admit):  1/14/25: -9250 mL  1/17/25: -3256 mL    Energy Calories Required: not meeting needs  Protein Required: meeting needs  Fluid Required: not meeting needs  Total Fluid Intake (mL): 510  Tolerance: tolerating  % Intake of Estimated Energy Needs: 50 - 75 %  % Meal Intake: 0-100%    Nutrition Risk    Level of Risk/Frequency of Follow-up: moderate     Monitor and Evaluation    Food and Nutrient Intake: energy intake, food and beverage intake  Food and Nutrient Adminstration: diet order  Knowledge/Beliefs/Attitudes: food and nutrition knowledge/skill, beliefs and attitudes  Anthropometric Measurements: weight, weight change, body mass index  Biochemical Data, Medical Tests and Procedures: electrolyte and renal panel     Nutrition Follow-Up    RD Follow-up?: Yes  Ashley Alonso, RD, LDN, CNSC

## 2025-01-22 NOTE — PROGRESS NOTES
[facility] Medicine  Progress Note    Patient Name: Shireen Felix  MRN: [id number]  Patient Class: IP- Inpatient   Admission Date: 12/31/2024  Length of Stay: 21 days  Attending Physician: Yoselin Rodriguez MD  Primary Care Provider: Laron Chaudhari MD        Subjective     Principal Problem:UIP (usual interstitial pneumonitis)        HPI:  Shireen Felix is a 79 year old female who has  has a past medical history of Abnormal ECG, Acute bronchitis and bronchiolitis, Aneurysm, Anticoagulant long-term use, Arthritis, CAD in native artery, COPD (chronic obstructive pulmonary disease), Diabetes mellitus, Fall, Glaucoma, Hypertension, Seizures, Shingles, and Stroke who presented to Emergency Department for evaluation for cough. Associated symptoms include: congestion, wheezing, and SOB. She was seen by her pulmonologist today, Dr. Sam. Started as sinus pain and drainage and now has progressed to respiratory symptoms. She was seen a few days ago in the ER given prescription for antibiotics steroids but these have not helped much. Today in the office she can barely talk without coughing continually. She is having a difficult time putting more than 3 words together without severe cough paroxysms. ED workup showed Hgb/Hct 11.9/37.0, K+ 3.1, CO2 16, BUN/creatinine 34/1.5. CT chest w/out contrast stable suspected pulmonary fibrosis of the lungs. No acute infiltrates. She has received DuoNeb x 1, solumedrol 125 mg IV x 1, KCL 40 mEq PO x 1 and NS 1L bolus. Pt admitted for COVID.     Overview/Hospital Course:  Admitted to Hospital Medicine for acute on chronic respiratory failure concerns in setting of COVID-19.  Started on IV methylprednisolone, remdesivir, scheduled respiratory treatments. 01/04: Reported left lower quadrant abdominal pain with tenderness to palpation, will evaluate via CT as patient is on therapeutic anticoagulation for COVID-19.  CT abdomen/pelvis nonacute.  01/05:  Reported improvements in respiratory status with decreased frequency of cough and dyspnea but still reporting significant symptoms from baseline.  On 1/6, patient had a significant event with acute tachypnea, tachycardia. Pulmonology consulted, orders adjusted. Patient stable overnight, had another event on 1/7 similar to previous day. Possible exacerbation related to anxiety provoked by medications and congestion. Case reviewed, orders adjusted. Per Pulmonology, due to underlying lung fibrosis, patient is likely not benefiting from bronchodilators. Stopped on 1/7, patient did not experience any further episodes of anxiety, respiratory distress. Will continue xanax and prn morphine. Patient with epistaxis, small clots in tissue noted. Reduced supplemental O2 to 1L at rest, stopped full dose lovenox and placed on ppx dose. Hgb stable.   Difficulty with discharge plan, patient originally planned to d/c to SNF, now prefers to d/c home.  Pulmonology discussed home palliative care vs hospice.  Patient and family wish to discuss the Services further.  Per discussion with patient, family will be available for an informational visit on Monday.  Educated patient on results of oxygen evaluation, advised patient to reduce oxygen to 2 L or less at rest, requires 4 L with exertion.  As clinically indicated if significant improvement noted will repeat oxygen evaluation.     01/12/2025  O2 sats stable on 2L (while at rest). Patient and family wants to try and go home, but unable to participate with PT over weekend. Repeat desat study on Monday. May have to consider palliative measures if progress plateaus.      01/13/2025  Feeling slightly better today than she felt a few days ago but overall still significantly short of breath with exertion.  Discussed with her, palliative Care, and her family.  She does not want to change to hospice at this time.  She wants to try to get better.  She does want to be DNR though.   Appreciate further palliative care involvement.  Still desaturating significantly with exertion and unable to work with PT because of this    01/14/2025  Feeling perhaps slightly better today, attempting to work with physical therapy.  We are hoping that we could either get her to rehab if her oxygen requirements with exertion go down, or potentially get a bigger concentrator at home for increasing her up to 10 L with exertion at home.  Otherwise no changes to the plan    01/15/2025:  Still having coughing spells but notably improved.  Oxygen is being down titrated and she even got a room air today.  We are screening her for rehabs    01/16/2025:  Coughing she states is much worse when oxygen is off.  She was on room air but I put her on 1 L to see if there is truly an effect.  She is still being screened for rehabs.  Overall she looks much better.  She did technically have a fever today to 101.4 but when I evaluate her she looks notably better, CXR is negative for acute change, would not recommend starting antibiotics and she has defervesced    1/17/25: Somnolent this AM, spiking more fevers yesterday evening, and mildly soft BP this AM. Lactate wnl. Alert and oriented on my eval. CXR with ? Infiltrate, more cough, feeling worse. Will start ABX for PNA, blood cultures, sputum culture    01/18/2025: Significant epistaxis and hemoptysis overnight.  CT with and without contrast did not reveal a source of bleed.  Still coughing with hemoptysis this morning.  On exam with obvious epistaxis, packed by ENT, resolved.  Coughing has improved.  Currently on Ventimask to avoid nasal cannula     01/19/2025: Doing better today, no more hemoptysis, less cough, no more epistaxis, hemoglobin up slightly.  Still on Ventimask to avoid nasal cannula but only requiring minimal quantities of oxygen, can take it off to eat.  Listed precautions yesterday evening for COVID given has been more than 10 days since diagnosis    1/20/25:  reporting abdominal pain when coughing. States she reported yesterday as well.     1/21/25 patient reports she is feeling better. Abdominal pain with movement. Audible wheezing noted. Duoneuyen added    1/22/25: patient with persistent cough. Chest x-ray stable. Changed Mucinex to Mucinex DM. Continue steroids, slow taper    Interval History: f/u respiratory failure patient with persistent cough. Reports she felt better yesterday. Adjusted medication    Review of Systems  Objective:     Vital Signs (Most Recent):  Temp: 97.7 °F (36.5 °C) (01/22/25 1145)  Pulse: 88 (01/22/25 1336)  Resp: 16 (01/22/25 1336)  BP: (!) 150/67 (01/22/25 1145)  SpO2: 100 % (01/22/25 1336) Vital Signs (24h Range):  Temp:  [97.3 °F (36.3 °C)-98.5 °F (36.9 °C)] 97.7 °F (36.5 °C)  Pulse:  [] 88  Resp:  [16-20] 16  SpO2:  [91 %-100 %] 100 %  BP: (114-150)/(55-92) 150/67     Weight: 54.2 kg (119 lb 7.8 oz)  Body mass index is 20.51 kg/m².    Intake/Output Summary (Last 24 hours) at 1/22/2025 1401  Last data filed at 1/21/2025 1715  Gross per 24 hour   Intake 240 ml   Output --   Net 240 ml         Physical Exam  Constitutional:       Appearance: She is ill-appearing.   HENT:      Head: Normocephalic and atraumatic.   Cardiovascular:      Rate and Rhythm: Normal rate and regular rhythm.      Heart sounds: No murmur heard.  Pulmonary:      Effort: Pulmonary effort is normal. No respiratory distress.      Breath sounds: Normal breath sounds. No wheezing.   Abdominal:      General: Bowel sounds are normal. There is no distension.      Palpations: Abdomen is soft.      Tenderness: There is no abdominal tenderness.   Musculoskeletal:         General: No swelling.   Skin:     General: Skin is warm and dry.   Neurological:      Mental Status: She is alert and oriented to person, place, and time. Mental status is at baseline.             Significant Labs: All pertinent labs within the past 24 hours have been reviewed.    Significant Imaging: I have  reviewed all pertinent imaging results/findings within the past 24 hours.    Assessment and Plan     * UIP (usual interstitial pneumonitis)  Pulm fibrosis hx- followed by pulmonology outpatient  Likely had flare of pulmonary fibrosis secondary to the COVID infection  This likely explains her continued symptoms, hypoxia, cough, etc.   We had been initially discussing whether patient would need to transitioned to hospice as she was requiring up to 10 L  Oxygen requirement has improved significantly,  Continue steroids, slow taper      Hyperkalemia  Hyperkalemia is likely due to  metabolic acidosis .The patients most recent potassium results are listed below.  Recent Labs     01/20/25  0645 01/21/25  0645 01/22/25  0449   K 6.0* 5.4* 4.5       Plan  - Monitor for arrhythmias with EKG and/or continuous telemetry.   - - Monitor potassium: Daily  - The patient's hyperkalemia is resolved            Atrial fibrillation  Patient with paroxysmal (<7 days) atrial fibrillation which is {:controlled currently without medication. Patient is currently in sinus rhythm.[id number] Score: 5. HASBLED Score: . Anticoagulation Patient is not anticoagulated due to bleeding risk .           Nasal septal deviation        Hemoptysis  Likely secondary to the epistaxis.  CT with and without contrast negative.  On TXA nebs but if no further hemoptysis may be able to stop soon.  Likely exacerbated by the coughing so adding cough syrup with codeine today in addition to her other antitussives. Improved significantly after addressing the epistaxis 01/18/2025      Hospital acquired PNA  -continue cefepime day 5/7    Encounter for palliative care  Palliative Care is following.  Receiving hydrocodone/acetaminophen p.r.n., morphine p.r.n., alprazolam p.r.n. for respiratory distress with good effect      Epistaxis  Likely secondary to supplemental O2, septal defect.  ENT packed the nose 1/18/25 and it seems to have resolved.  I think this is the cause  "of hemoptysis.  Pulm saw the patient as well and she is on tranexamic acid nebs although we do not feel the bleeding is coming from the lungs.  CT with and without contrast did not reveal a source of bleeding.  Hemoglobin has been dropping over the past few days but is not increasing after addressing the epistaxis.    -Mucomyst nasal spray to keep the septum moist    Acute on chronic respiratory failure with hypoxia  Patient with Hypoxic Respiratory failure which is Acute on chronic.  she is not on home oxygen. Supplemental oxygen was provided and noted- Oxygen Concentration (%):  [28] 28    .   Signs/symptoms of respiratory failure include- tachypnea, increased work of breathing, respiratory distress, use of accessory muscles, wheezing, stridor, and lethargy. Contributing diagnoses includes - Interstitial lung disease Labs and images were reviewed. Patient Has recent ABG, which has been reviewed. Will treat underlying causes and adjust management of respiratory failure as follows-   -will need new home oxygen evaluation  -continue steroids, slow taper                           CKD (chronic kidney disease)  Creatine stable for now. BMP reviewed- noted Estimated Creatinine Clearance: 27.4 mL/min (based on SCr of 1.4 mg/dL). according to latest data. Based on current GFR, CKD stage is stage 4 - GFR 15-29.  Monitor UOP and serial BMP and adjust therapy as needed. Renally dose meds. Avoid nephrotoxic medications and procedures.  -Creatinine 1.5 on admission however 3 months ago was 1.2    Recent Labs     01/20/25  0645 01/21/25  0645 01/22/25  0449   CREATININE 1.3 1.4 1.4         -Due to GFR, will need to dc ranexa for now  -Holding spironolactone, entresto  -Monitor creatinine      Chronic systolic heart failure  Patient has Diastolic (HFpEF) heart failure that is Chronic. On presentation their CHF was well compensated. Most recent BNP and echo results are listed below.  No results for input(s): "BNP" in the last 72 " hours.    Latest ECHO  Results for orders placed during the hospital encounter of 06/02/23    Echo    Interpretation Summary  · The left ventricle is normal in size with concentric hypertrophy and low normal systolic function.  · The estimated ejection fraction is 50%.  · Normal left ventricular diastolic function.  · There is abnormal septal wall motion consistent with left bundle branch block.  · Normal right ventricular size with normal right ventricular systolic function.  · Normal central venous pressure (3 mmHg).  · The estimated PA systolic pressure is 30 mmHg.  · Mild mitral regurgitation.    Current Heart Failure Medications  furosemide tablet 20 mg, Daily, Oral    Plan  - Monitor strict I&Os and daily weights.    - Place on telemetry  - Low sodium diet  - Place on fluid restriction of 1.5 L.   - Cardiology has not been consulted  - The patient's volume status is at their baseline  -         COVID-19  ** initially COVID positive on 12/31 on admission **   Patient is identified as Severe COVID-19 based on hypoxemia with O2 saturations <94% on room air or on ambulation   Initiated standard COVID protocols; COVID-19 testing ,Infection Control notification  and isolation- respiratory, contact and droplet per protocol    Management: Treated with targetedtherapy with Remdesivir, 200mg IV x1, followed by 100mg IV daily x5 days total and Anticoagulation, Patient admitted to non-critical care unit- Will initiate full dose anticoagulation with Weight based lovenox 1mg/kg IV q12, stopped on 1/11/25, Maintain oxygen saturations 92-96% via Nasal Cannula  and monitor with continuous/intermittent pulse oximetry. , and Continuous cardiac monitoring.    As of 01/18/2025 DC'd  precautions as it has been more than 10 days since onset, and her persistent symptoms are likely a flare of her interstitial lung disease/postviral pneumonia     Mixed hyperlipidemia  Lab Results   Component Value Date    CHOL 112 (L) 07/12/2024    HDL  41 07/12/2024    LDLCALC 49.8 (L) 07/12/2024    TRIG 106 07/12/2024     -Continue Statin    Fall  Fall night of 1/15 with head strike   Evaluated by nighttime hospitalist   Head CT was negative   Neuro exam on 01/16 is normal   Fall precautions  Continue therapy      Normocytic anemia  Anemia is likely due to chronic disease due to Chronic Kidney Disease. Most recent hemoglobin and hematocrit are listed below.  Recent Labs     01/20/25  0645 01/21/25  0920 01/22/25  0449   HGB 8.1* 9.0* 8.8*   HCT 27.4* 29.0* 29.3*       Plan  - Monitor serial CBC: Daily  - Transfuse PRBC if patient becomes hemodynamically unstable, symptomatic or H/H drops below 7/21.  - Patient has not received any PRBC transfusions to date  - Patient's anemia is currently stable      Seizure  Hx of seizures, patient has not had a seizure during this admission.   At home is on seizure medication Aptiom 400mg daily and Keppra 1,000mg BID  Aptiom likely worsening hyponatremia and also inhibiting SNF placement due to cost, so discussed with neuro on call  Per neuro on call, would DC Aptiom and increase Keppra to 1,500mg BID, change made  Can follow up with neuro as outpatient to revisit        Coronary artery disease of native artery of native heart with stable angina pectoris  Patient with known CAD  No anginal signs or symptoms   On Plavix and statin as an outpatient   Holding Plavix currently for the epistaxis, will resume in couple of days     COPD exacerbation  Scheduled Duonebs  Continue oral and inhaled steroids  LABA    GERD (gastroesophageal reflux disease)  -Continue PPI      Nasal septal perforation  Was likely contributing to the epistaxis   Seen by ENT   Packing in place   No further bleeding   Mucomyst nasal spray to the septal defect      VTE Risk Mitigation (From admission, onward)      None            Discharge Planning   KATHERINE:      Code Status: DNR   Medical Readiness for Discharge Date:   Discharge Plan A: Skilled Nursing Facility    Discharge Delays: None known at this time            Please place Justification for DME        Yoselin Rodriguez MD  Department of Hospital Medicine   [facility] - Telemetry ([facility])

## 2025-01-22 NOTE — ASSESSMENT & PLAN NOTE
Pulm fibrosis hx- followed by pulmonology outpatient  Likely had flare of pulmonary fibrosis secondary to the COVID infection  This likely explains her continued symptoms, hypoxia, cough, etc.   We had been initially discussing whether patient would need to transitioned to hospice as she was requiring up to 10 L  Oxygen requirement has improved significantly,  Continue steroids, slow taper

## 2025-01-22 NOTE — ASSESSMENT & PLAN NOTE
Creatine stable for now. BMP reviewed- noted Estimated Creatinine Clearance: 27.4 mL/min (based on SCr of 1.4 mg/dL). according to latest data. Based on current GFR, CKD stage is stage 4 - GFR 15-29.  Monitor UOP and serial BMP and adjust therapy as needed. Renally dose meds. Avoid nephrotoxic medications and procedures.  -Creatinine 1.5 on admission however 3 months ago was 1.2    Recent Labs     01/20/25  0645 01/21/25  0645 01/22/25  0449   CREATININE 1.3 1.4 1.4         -Due to GFR, will need to dc ranexa for now  -Holding spironolactone, entresto  -Monitor creatinine

## 2025-01-22 NOTE — ASSESSMENT & PLAN NOTE
Was likely contributing to the epistaxis   Seen by ENT   Packing in place   No further bleeding   Mucomyst nasal spray to the septal defect

## 2025-01-22 NOTE — ASSESSMENT & PLAN NOTE
Patient with known CAD  No anginal signs or symptoms   On Plavix and statin as an outpatient   Holding Plavix currently for the epistaxis, will resume in couple of days

## 2025-01-22 NOTE — ASSESSMENT & PLAN NOTE
Hyperkalemia is likely due to  metabolic acidosis .The patients most recent potassium results are listed below.  Recent Labs     01/20/25  0645 01/21/25  0645 01/22/25  0449   K 6.0* 5.4* 4.5       Plan  - Monitor for arrhythmias with EKG and/or continuous telemetry.   - - Monitor potassium: Daily  - The patient's hyperkalemia is resolved

## 2025-01-22 NOTE — ASSESSMENT & PLAN NOTE
Anemia is likely due to chronic disease due to Chronic Kidney Disease. Most recent hemoglobin and hematocrit are listed below.  Recent Labs     01/20/25  0645 01/21/25  0920 01/22/25  0449   HGB 8.1* 9.0* 8.8*   HCT 27.4* 29.0* 29.3*       Plan  - Monitor serial CBC: Daily  - Transfuse PRBC if patient becomes hemodynamically unstable, symptomatic or H/H drops below 7/21.  - Patient has not received any PRBC transfusions to date  - Patient's anemia is currently stable     Oriented - self; Oriented - place; Oriented - time

## 2025-01-22 NOTE — PLAN OF CARE
Nutrition Recommendations/Interventions on 1/22/25:  1. Recommend pt continues on a Cardiac, low potassium diet  2. Recommend pt continues on [place] TID to assist filling nutritional gaps   3. Encourage PO and supplement intake, recommend feeding assistance as warranted   4. Weigh twice weekly     Goals:   1. Diet will be modified to safest texture within 24 hrs if warranted (met)  2. Pt will tolerate and consume >75% EEN and EPN prior to RD follow up (progressing)   Nutrition Goal Status: active  Communication of RD Recs: other (comment) (POC, sticky note)    Ashley Alonso, RD, LDN, CNSC

## 2025-01-22 NOTE — ASSESSMENT & PLAN NOTE
Patient with Hypoxic Respiratory failure which is Acute on chronic.  she is not on home oxygen. Supplemental oxygen was provided and noted- Oxygen Concentration (%):  [28] 28    .   Signs/symptoms of respiratory failure include- tachypnea, increased work of breathing, respiratory distress, use of accessory muscles, wheezing, stridor, and lethargy. Contributing diagnoses includes - Interstitial lung disease Labs and images were reviewed. Patient Has recent ABG, which has been reviewed. Will treat underlying causes and adjust management of respiratory failure as follows-   -will need new home oxygen evaluation  -continue steroids, slow taper

## 2025-01-22 NOTE — SUBJECTIVE & OBJECTIVE
Interval History: f/u respiratory failure patient with persistent cough. Reports she felt better yesterday. Adjusted medication    Review of Systems  Objective:     Vital Signs (Most Recent):  Temp: 97.7 °F (36.5 °C) (01/22/25 1145)  Pulse: 88 (01/22/25 1336)  Resp: 16 (01/22/25 1336)  BP: (!) 150/67 (01/22/25 1145)  SpO2: 100 % (01/22/25 1336) Vital Signs (24h Range):  Temp:  [97.3 °F (36.3 °C)-98.5 °F (36.9 °C)] 97.7 °F (36.5 °C)  Pulse:  [] 88  Resp:  [16-20] 16  SpO2:  [91 %-100 %] 100 %  BP: (114-150)/(55-92) 150/67     Weight: 54.2 kg (119 lb 7.8 oz)  Body mass index is 20.51 kg/m².    Intake/Output Summary (Last 24 hours) at 1/22/2025 1401  Last data filed at 1/21/2025 1715  Gross per 24 hour   Intake 240 ml   Output --   Net 240 ml         Physical Exam  Constitutional:       Appearance: She is ill-appearing.   HENT:      Head: Normocephalic and atraumatic.   Cardiovascular:      Rate and Rhythm: Normal rate and regular rhythm.      Heart sounds: No murmur heard.  Pulmonary:      Effort: Pulmonary effort is normal. No respiratory distress.      Breath sounds: Normal breath sounds. No wheezing.   Abdominal:      General: Bowel sounds are normal. There is no distension.      Palpations: Abdomen is soft.      Tenderness: There is no abdominal tenderness.   Musculoskeletal:         General: No swelling.   Skin:     General: Skin is warm and dry.   Neurological:      Mental Status: She is alert and oriented to person, place, and time. Mental status is at baseline.             Significant Labs: All pertinent labs within the past 24 hours have been reviewed.    Significant Imaging: I have reviewed all pertinent imaging results/findings within the past 24 hours.

## 2025-01-23 LAB
ANION GAP SERPL CALC-SCNC: 11 MMOL/L (ref 8–16)
BASOPHILS # BLD AUTO: ABNORMAL K/UL (ref 0–0.2)
BASOPHILS NFR BLD: 0 % (ref 0–1.9)
BUN SERPL-MCNC: 43 MG/DL (ref 8–23)
CALCIUM SERPL-MCNC: 9.8 MG/DL (ref 8.7–10.5)
CHLORIDE SERPL-SCNC: 106 MMOL/L (ref 95–110)
CO2 SERPL-SCNC: 21 MMOL/L (ref 23–29)
CREAT SERPL-MCNC: 1.2 MG/DL (ref 0.5–1.4)
DACRYOCYTES BLD QL SMEAR: ABNORMAL
DIFFERENTIAL METHOD BLD: ABNORMAL
EOSINOPHIL # BLD AUTO: ABNORMAL K/UL (ref 0–0.5)
EOSINOPHIL NFR BLD: 0 % (ref 0–8)
ERYTHROCYTE [DISTWIDTH] IN BLOOD BY AUTOMATED COUNT: 15.1 % (ref 11.5–14.5)
EST. GFR  (NO RACE VARIABLE): 46 ML/MIN/1.73 M^2
GLUCOSE SERPL-MCNC: 75 MG/DL (ref 70–110)
HCT VFR BLD AUTO: 31.9 % (ref 37–48.5)
HGB BLD-MCNC: 9.6 G/DL (ref 12–16)
IMM GRANULOCYTES # BLD AUTO: ABNORMAL K/UL (ref 0–0.04)
IMM GRANULOCYTES NFR BLD AUTO: ABNORMAL % (ref 0–0.5)
LYMPHOCYTES # BLD AUTO: ABNORMAL K/UL (ref 1–4.8)
LYMPHOCYTES NFR BLD: 21 % (ref 18–48)
MAGNESIUM SERPL-MCNC: 2.1 MG/DL (ref 1.6–2.6)
MCH RBC QN AUTO: 30.6 PG (ref 27–31)
MCHC RBC AUTO-ENTMCNC: 30.1 G/DL (ref 32–36)
MCV RBC AUTO: 102 FL (ref 82–98)
MONOCYTES # BLD AUTO: ABNORMAL K/UL (ref 0.3–1)
MONOCYTES NFR BLD: 5 % (ref 4–15)
NEUTROPHILS NFR BLD: 74 % (ref 38–73)
NRBC BLD-RTO: 0 /100 WBC
PHOSPHATE SERPL-MCNC: 3.2 MG/DL (ref 2.7–4.5)
PLATELET # BLD AUTO: 281 K/UL (ref 150–450)
PLATELET BLD QL SMEAR: ABNORMAL
PMV BLD AUTO: 9.9 FL (ref 9.2–12.9)
POIKILOCYTOSIS BLD QL SMEAR: SLIGHT
POTASSIUM SERPL-SCNC: 4 MMOL/L (ref 3.5–5.1)
RBC # BLD AUTO: 3.14 M/UL (ref 4–5.4)
SODIUM SERPL-SCNC: 138 MMOL/L (ref 136–145)
WBC # BLD AUTO: 6.12 K/UL (ref 3.9–12.7)

## 2025-01-23 PROCEDURE — 25000242 PHARM REV CODE 250 ALT 637 W/ HCPCS: Performed by: STUDENT IN AN ORGANIZED HEALTH CARE EDUCATION/TRAINING PROGRAM

## 2025-01-23 PROCEDURE — 99900035 HC TECH TIME PER 15 MIN (STAT)

## 2025-01-23 PROCEDURE — 84100 ASSAY OF PHOSPHORUS: CPT | Performed by: STUDENT IN AN ORGANIZED HEALTH CARE EDUCATION/TRAINING PROGRAM

## 2025-01-23 PROCEDURE — 94761 N-INVAS EAR/PLS OXIMETRY MLT: CPT

## 2025-01-23 PROCEDURE — 85027 COMPLETE CBC AUTOMATED: CPT | Performed by: STUDENT IN AN ORGANIZED HEALTH CARE EDUCATION/TRAINING PROGRAM

## 2025-01-23 PROCEDURE — 97530 THERAPEUTIC ACTIVITIES: CPT

## 2025-01-23 PROCEDURE — 85007 BL SMEAR W/DIFF WBC COUNT: CPT | Performed by: STUDENT IN AN ORGANIZED HEALTH CARE EDUCATION/TRAINING PROGRAM

## 2025-01-23 PROCEDURE — 25000003 PHARM REV CODE 250: Performed by: STUDENT IN AN ORGANIZED HEALTH CARE EDUCATION/TRAINING PROGRAM

## 2025-01-23 PROCEDURE — 25000003 PHARM REV CODE 250: Performed by: INTERNAL MEDICINE

## 2025-01-23 PROCEDURE — 83735 ASSAY OF MAGNESIUM: CPT | Performed by: STUDENT IN AN ORGANIZED HEALTH CARE EDUCATION/TRAINING PROGRAM

## 2025-01-23 PROCEDURE — 63600175 PHARM REV CODE 636 W HCPCS: Performed by: NURSE PRACTITIONER

## 2025-01-23 PROCEDURE — 94640 AIRWAY INHALATION TREATMENT: CPT

## 2025-01-23 PROCEDURE — 27100171 HC OXYGEN HIGH FLOW UP TO 24 HOURS

## 2025-01-23 PROCEDURE — 63600175 PHARM REV CODE 636 W HCPCS: Performed by: INTERNAL MEDICINE

## 2025-01-23 PROCEDURE — 97535 SELF CARE MNGMENT TRAINING: CPT

## 2025-01-23 PROCEDURE — 25000242 PHARM REV CODE 250 ALT 637 W/ HCPCS: Performed by: INTERNAL MEDICINE

## 2025-01-23 PROCEDURE — 21400001 HC TELEMETRY ROOM

## 2025-01-23 PROCEDURE — 80048 BASIC METABOLIC PNL TOTAL CA: CPT | Performed by: STUDENT IN AN ORGANIZED HEALTH CARE EDUCATION/TRAINING PROGRAM

## 2025-01-23 PROCEDURE — 25000003 PHARM REV CODE 250: Performed by: NURSE PRACTITIONER

## 2025-01-23 RX ADMIN — GUAIFENESIN AND DEXTROMETHORPHAN HYDROBROMIDE 2 TABLET: 600; 30 TABLET, EXTENDED RELEASE ORAL at 09:01

## 2025-01-23 RX ADMIN — OXYCODONE HYDROCHLORIDE AND ACETAMINOPHEN 500 MG: 500 TABLET ORAL at 09:01

## 2025-01-23 RX ADMIN — CEFEPIME 1 G: 1 INJECTION, POWDER, FOR SOLUTION INTRAMUSCULAR; INTRAVENOUS at 01:01

## 2025-01-23 RX ADMIN — FUROSEMIDE 20 MG: 20 TABLET ORAL at 08:01

## 2025-01-23 RX ADMIN — ARFORMOTEROL TARTRATE 15 MCG: 15 SOLUTION RESPIRATORY (INHALATION) at 07:01

## 2025-01-23 RX ADMIN — SALINE NASAL SPRAY 1 SPRAY: 1.5 SOLUTION NASAL at 09:01

## 2025-01-23 RX ADMIN — SALINE NASAL SPRAY 1 SPRAY: 1.5 SOLUTION NASAL at 08:01

## 2025-01-23 RX ADMIN — MORPHINE SULFATE 2 MG: 2 INJECTION, SOLUTION INTRAMUSCULAR; INTRAVENOUS at 02:01

## 2025-01-23 RX ADMIN — IPRATROPIUM BROMIDE AND ALBUTEROL SULFATE 3 ML: 2.5; .5 SOLUTION RESPIRATORY (INHALATION) at 12:01

## 2025-01-23 RX ADMIN — ATORVASTATIN CALCIUM 40 MG: 40 TABLET, FILM COATED ORAL at 08:01

## 2025-01-23 RX ADMIN — BENZONATATE 200 MG: 100 CAPSULE ORAL at 08:01

## 2025-01-23 RX ADMIN — BENZONATATE 200 MG: 100 CAPSULE ORAL at 01:01

## 2025-01-23 RX ADMIN — OXYCODONE HYDROCHLORIDE AND ACETAMINOPHEN 500 MG: 500 TABLET ORAL at 08:01

## 2025-01-23 RX ADMIN — PREDNISONE 20 MG: 20 TABLET ORAL at 08:01

## 2025-01-23 RX ADMIN — IPRATROPIUM BROMIDE AND ALBUTEROL SULFATE 3 ML: 2.5; .5 SOLUTION RESPIRATORY (INHALATION) at 07:01

## 2025-01-23 RX ADMIN — GABAPENTIN 300 MG: 300 CAPSULE ORAL at 08:01

## 2025-01-23 RX ADMIN — MONTELUKAST 10 MG: 10 TABLET, FILM COATED ORAL at 08:01

## 2025-01-23 RX ADMIN — THERA TABS 1 TABLET: TAB at 08:01

## 2025-01-23 RX ADMIN — ESCITALOPRAM OXALATE 20 MG: 10 TABLET, FILM COATED ORAL at 08:01

## 2025-01-23 RX ADMIN — CEFEPIME 1 G: 1 INJECTION, POWDER, FOR SOLUTION INTRAMUSCULAR; INTRAVENOUS at 12:01

## 2025-01-23 RX ADMIN — LEVETIRACETAM 1500 MG: 500 TABLET, FILM COATED ORAL at 08:01

## 2025-01-23 RX ADMIN — ALPRAZOLAM 0.25 MG: 0.25 TABLET ORAL at 08:01

## 2025-01-23 RX ADMIN — BUDESONIDE INHALATION 0.5 MG: 0.5 SUSPENSION RESPIRATORY (INHALATION) at 07:01

## 2025-01-23 RX ADMIN — GABAPENTIN 300 MG: 300 CAPSULE ORAL at 09:01

## 2025-01-23 RX ADMIN — PANTOPRAZOLE SODIUM 40 MG: 40 TABLET, DELAYED RELEASE ORAL at 09:01

## 2025-01-23 RX ADMIN — LEVETIRACETAM 1500 MG: 500 TABLET, FILM COATED ORAL at 09:01

## 2025-01-23 RX ADMIN — CEFEPIME 1 G: 1 INJECTION, POWDER, FOR SOLUTION INTRAMUSCULAR; INTRAVENOUS at 11:01

## 2025-01-23 RX ADMIN — BENZONATATE 200 MG: 100 CAPSULE ORAL at 09:01

## 2025-01-23 RX ADMIN — PANTOPRAZOLE SODIUM 40 MG: 40 TABLET, DELAYED RELEASE ORAL at 08:01

## 2025-01-23 RX ADMIN — GUAIFENESIN AND DEXTROMETHORPHAN HYDROBROMIDE 2 TABLET: 600; 30 TABLET, EXTENDED RELEASE ORAL at 08:01

## 2025-01-23 RX ADMIN — IPRATROPIUM BROMIDE AND ALBUTEROL SULFATE 3 ML: 2.5; .5 SOLUTION RESPIRATORY (INHALATION) at 01:01

## 2025-01-23 NOTE — PLAN OF CARE
Ongoing (interventions implemented as appropriate)  Pt is alert and oriented.    VSS  Pt able to make needs known.  Pt remained afebrile throughout this shift.   Pt remained free of falls this shift.   Pt denied pain this shift.   Plan of care reviewed. Patient verbalizes understanding.   Pt moving/turing independent. Frequent weight shifting encouraged.  Patient sinus tach. rhythm on monitor.   Bed low, side rails up x 2, wheels locked, call light in reach.   Hourly rounding completed.   Will continue to observe.

## 2025-01-23 NOTE — PT/OT/SLP PROGRESS
Occupational Therapy   Treatment    Name: Shireen Fleix  MRN: [id number]  Admitting Diagnosis:  UIP (usual interstitial pneumonitis)       Recommendations:     Discharge Recommendations: Moderate Intensity Therapy  Discharge Equipment Recommendations:  to be determined by next level of care, oxygen  Barriers to discharge:  Decreased caregiver support    Assessment:     Shireen Felix is a 79 y.o. female with a medical diagnosis of UIP (usual interstitial pneumonitis).  She presents with the following performance deficits affecting function are weakness, impaired endurance, impaired self care skills, impaired functional mobility, gait instability, decreased safety awareness, impaired balance, decreased coordination, decreased lower extremity function, decreased ROM, impaired cardiopulmonary response to activity.     Rehab Prognosis:  Fair; patient would benefit from acute skilled OT services to address these deficits and reach maximum level of function.       Plan:     Patient to be seen 2 x/week to address the above listed problems via self-care/home management, therapeutic activities, therapeutic exercises  Plan of Care Expires: 01/17/25  Plan of Care Reviewed with: patient    Subjective     Chief Complaint: Back Pain  Patient/Family Comments/goals: Increase ADL independence  Pain/Comfort:  Pain Rating 1: 5/10  Location - Side 1: Bilateral  Location - Orientation 1: generalized  Location 1: back  Pain Addressed 1: Reposition, Distraction  Pain Rating Post-Intervention 1: 5/10    Objective:     Communicated with: Gracia prior to session.  Patient found HOB elevated with peripheral IV, pulse ox (continuous) upon OT entry to room.    General Precautions: Standard, fall    Orthopedic Precautions:N/A  Braces: N/A  Respiratory Status: Nasal cannula, flow 6 L/min     Occupational Performance:     Bed Mobility:    Patient completed Rolling/Turning to Right with contact guard assistance  Patient completed  Scooting/Bridging with contact guard assistance  Patient completed Supine to Sit with contact guard assistance   Pt completed EOB seated for 5min with CGA    Functional Mobility/Transfers:  Patient completed Sit <> Stand Transfer with moderate assistance  with  rolling walker   Functional Mobility: Patient completed x3ft functional mobility x2 Min A w/ RW to increase dynamic standing balance and activity tolerance needed for ADL completion.  Pt required break at 3 ft for SOB and Fatigue  Pt required v/c for hand placement on RW and when sitting down in chair  Pt completed Stand Pivot Transfer from RW to chair with Min A    Activities of Daily Living:  Grooming: setup assist brush dentures and wash mouth      AMPAC 6 Click ADL: 17    Treatment & Education:  Encouraged completion of B UE AROM therex throughout the day to increase functional strength and activity tolerance needed for ADL completion.  OT role, plan of care, progression of goals, importance of continued OOB activity, ADL/functional transfer and mobility retraining, call don't fall, safety precautions, fall prevention. Pt educated on sitting in chair for 1 hour during breakfast lunch and dinner in order to change positions, regulate BP and reduce bed sores.   Pt acknowledges and agrees with POC given by OT.      Patient left up in chair with all lines intact, call button in reach, and chair alarm on    GOALS:   Multidisciplinary Problems       Occupational Therapy Goals          Problem: Occupational Therapy    Goal Priority Disciplines Outcome Interventions   Occupational Therapy Goal     OT, PT/OT Progressing    Description: O.T GOALS MET BY 1-17-25  PT WILL TOLERATE  1 SET X 12 REPS B UE ROM EXERCISE  S WITH UE DRESSING  S WITH LE DRESSING  S WITH TOILET TRANSFERS                             Time Tracking:     OT Date of Treatment: 01/23/25  OT Start Time: 1030  OT Stop Time: 1100  OT Total Time (min): 30 min    Billable Minutes:Self Care/Home  Management 10  Therapeutic Activity 20    OT/CORINA: CORINA     Number of CORINA visits since last OT visit: 1  HOANG Tovar    1/23/2025

## 2025-01-23 NOTE — ASSESSMENT & PLAN NOTE
Hyperkalemia is likely due to  metabolic acidosis .The patients most recent potassium results are listed below.  Recent Labs     01/21/25  0645 01/22/25  0449 01/23/25  0551   K 5.4* 4.5 4.0       Plan  - Monitor for arrhythmias with EKG and/or continuous telemetry.   - - Monitor potassium: Daily  - The patient's hyperkalemia is resolved

## 2025-01-23 NOTE — PLAN OF CARE
A223/A223 OLU Felix is a 79 y.o.female admitted on 12/31/2024 for UIP (usual interstitial pneumonitis)   Code Status: DNR MRN: [id number]   Review of patient's allergies indicates:   Allergen Reactions    Penicillins Anaphylaxis, Hives, Shortness Of Breath and Swelling    Penicillin     Adhesive Rash    Doxycycline Nausea Only    Oxycodone Other (See Comments)     Fatigue      Sulfa (sulfonamide antibiotics) Itching     Past Medical History:   Diagnosis Date    Abnormal ECG 08/05/2019    Acute bronchitis and bronchiolitis 12/31/2024    Aneurysm     Anticoagulant long-term use     Arthritis     CAD in native artery 08/05/2019    Diabetes mellitus     Fall 10/10/2019    Formatting of this note might be different from the original. Found sitting on floor next to bed last night Mild confusion today Does not recall falling or how she ended up on floor UA today Labs yesterday unremarkable    Glaucoma     Hypertension     Pulmonary fibrosis     Seizures     Shingles 05/27/2017    Stroke       PRN meds    acetaminophen, 650 mg, Q6H PRN  ALPRAZolam, 0.25 mg, TID PRN  dextrose 50%, 12.5 g, PRN  dextrose 50%, 25 g, PRN  glucagon (human recombinant), 1 mg, PRN  glucose, 16 g, PRN  glucose, 24 g, PRN  ipratropium, 0.5 mg, Q4H PRN  magic mouthwash (diphenhydrAMINE 12.5 mg/5 mL 20 mL, aluminum & magnesium hydroxide-simethicone (MYLANTA) 20 mL, LIDOcaine HCl 2% (XYLOCAINE) 20 mL) solution, 10 mL, Q4H PRN  melatonin, 6 mg, Nightly PRN  methocarbamoL, 500 mg, TID PRN  morphine, 2 mg, Q4H PRN  ondansetron, 4 mg, Q8H PRN  promethazine-codeine 6.25-10 mg/5 ml, 5 mL, Q4H PRN  sodium chloride 0.9%, 10 mL, PRN         Pt oriented x4.  VSS.  Pt remained afebrile throughout this shift.   All meds administered per order.   Pt remained free of falls this shift.   Plan of care reviewed. Patient verbalizes understanding.   Pt moving/turning with assistance. Turning q2. Patient refusing wedge and utilizing pillows for support  Bed  low, side rails up x 2, wheels locked, call light in reach.   Patient instructed to call for assistance.  Patient education provided      Chart check completed. Will continue plan of care.      Orientation: oriented x 4  [place] Coma Scale Score: 15     Lead Monitored: Lead II Rhythm: normal sinus rhythm Frequency/Ectopy: PACs  Cardiac/Telemetry Box Number: [id number]  VTE Core Measure: Pharmacological prophylaxis initiated/maintained Last Bowel Movement: 01/22/24  Diet Cardiac Low Potassium; Isolation Tray - Styrofoam  Voiding Characteristics: incontinence  Gilberto Score: 17  Fall Risk Score: 19  Accucheck []   Freq?      Lines/Drains/Airways       Peripheral Intravenous Line  Duration                  Midline Catheter - Single Lumen 01/17/25 1925 Left basilic vein (medial side of arm) other (see comments) 4 days                    Problem: Adult Inpatient Plan of Care  Goal: Plan of Care Review  Outcome: Progressing  Goal: Patient-Specific Goal (Individualized)  Outcome: Progressing  Goal: Absence of Hospital-Acquired Illness or Injury  Outcome: Progressing  Goal: Optimal Comfort and Wellbeing  Outcome: Progressing  Goal: Readiness for Transition of Care  Outcome: Progressing     Problem: Diabetes Comorbidity  Goal: Blood Glucose Level Within Targeted Range  Outcome: Progressing     Problem: Fall Injury Risk  Goal: Absence of Fall and Fall-Related Injury  Outcome: Progressing     Problem: Wound  Goal: Optimal Coping  Outcome: Progressing  Goal: Optimal Functional Ability  Outcome: Progressing  Goal: Absence of Infection Signs and Symptoms  Outcome: Progressing  Goal: Improved Oral Intake  Outcome: Progressing  Goal: Optimal Pain Control and Function  Outcome: Progressing  Goal: Skin Health and Integrity  Outcome: Progressing  Goal: Optimal Wound Healing  Outcome: Progressing     Problem: Skin Injury Risk Increased  Goal: Skin Health and Integrity  Outcome: Progressing     Problem: Coping Ineffective  Goal: Effective  Coping  Outcome: Progressing     Problem: Pneumonia  Goal: Fluid Balance  Outcome: Progressing  Goal: Resolution of Infection Signs and Symptoms  Outcome: Progressing  Goal: Effective Oxygenation and Ventilation  Outcome: Progressing     Problem: Infection  Goal: Absence of Infection Signs and Symptoms  Outcome: Progressing

## 2025-01-23 NOTE — PROGRESS NOTES
[facility] Medicine  Progress Note    Patient Name: Shireen Felix  MRN: [id number]  Patient Class: IP- Inpatient   Admission Date: 12/31/2024  Length of Stay: 22 days  Attending Physician: Yoselin Rodriguez MD  Primary Care Provider: Laron Chaudhari MD        Subjective     Principal Problem:UIP (usual interstitial pneumonitis)        HPI:  Shireen Felix is a 79 year old female who has  has a past medical history of Abnormal ECG, Acute bronchitis and bronchiolitis, Aneurysm, Anticoagulant long-term use, Arthritis, CAD in native artery, COPD (chronic obstructive pulmonary disease), Diabetes mellitus, Fall, Glaucoma, Hypertension, Seizures, Shingles, and Stroke who presented to Emergency Department for evaluation for cough. Associated symptoms include: congestion, wheezing, and SOB. She was seen by her pulmonologist today, Dr. Sam. Started as sinus pain and drainage and now has progressed to respiratory symptoms. She was seen a few days ago in the ER given prescription for antibiotics steroids but these have not helped much. Today in the office she can barely talk without coughing continually. She is having a difficult time putting more than 3 words together without severe cough paroxysms. ED workup showed Hgb/Hct 11.9/37.0, K+ 3.1, CO2 16, BUN/creatinine 34/1.5. CT chest w/out contrast stable suspected pulmonary fibrosis of the lungs. No acute infiltrates. She has received DuoNeb x 1, solumedrol 125 mg IV x 1, KCL 40 mEq PO x 1 and NS 1L bolus. Pt admitted for COVID.     Overview/Hospital Course:  Admitted to Hospital Medicine for acute on chronic respiratory failure concerns in setting of COVID-19.  Started on IV methylprednisolone, remdesivir, scheduled respiratory treatments. 01/04: Reported left lower quadrant abdominal pain with tenderness to palpation, will evaluate via CT as patient is on therapeutic anticoagulation for COVID-19.  CT abdomen/pelvis nonacute.  01/05:  Reported improvements in respiratory status with decreased frequency of cough and dyspnea but still reporting significant symptoms from baseline.  On 1/6, patient had a significant event with acute tachypnea, tachycardia. Pulmonology consulted, orders adjusted. Patient stable overnight, had another event on 1/7 similar to previous day. Possible exacerbation related to anxiety provoked by medications and congestion. Case reviewed, orders adjusted. Per Pulmonology, due to underlying lung fibrosis, patient is likely not benefiting from bronchodilators. Stopped on 1/7, patient did not experience any further episodes of anxiety, respiratory distress. Will continue xanax and prn morphine. Patient with epistaxis, small clots in tissue noted. Reduced supplemental O2 to 1L at rest, stopped full dose lovenox and placed on ppx dose. Hgb stable.   Difficulty with discharge plan, patient originally planned to d/c to SNF, now prefers to d/c home.  Pulmonology discussed home palliative care vs hospice.  Patient and family wish to discuss the Services further.  Per discussion with patient, family will be available for an informational visit on Monday.  Educated patient on results of oxygen evaluation, advised patient to reduce oxygen to 2 L or less at rest, requires 4 L with exertion.  As clinically indicated if significant improvement noted will repeat oxygen evaluation.     01/12/2025  O2 sats stable on 2L (while at rest). Patient and family wants to try and go home, but unable to participate with PT over weekend. Repeat desat study on Monday. May have to consider palliative measures if progress plateaus.      01/13/2025  Feeling slightly better today than she felt a few days ago but overall still significantly short of breath with exertion.  Discussed with her, palliative Care, and her family.  She does not want to change to hospice at this time.  She wants to try to get better.  She does want to be DNR though.   Appreciate further palliative care involvement.  Still desaturating significantly with exertion and unable to work with PT because of this    01/14/2025  Feeling perhaps slightly better today, attempting to work with physical therapy.  We are hoping that we could either get her to rehab if her oxygen requirements with exertion go down, or potentially get a bigger concentrator at home for increasing her up to 10 L with exertion at home.  Otherwise no changes to the plan    01/15/2025:  Still having coughing spells but notably improved.  Oxygen is being down titrated and she even got a room air today.  We are screening her for rehabs    01/16/2025:  Coughing she states is much worse when oxygen is off.  She was on room air but I put her on 1 L to see if there is truly an effect.  She is still being screened for rehabs.  Overall she looks much better.  She did technically have a fever today to 101.4 but when I evaluate her she looks notably better, CXR is negative for acute change, would not recommend starting antibiotics and she has defervesced    1/17/25: Somnolent this AM, spiking more fevers yesterday evening, and mildly soft BP this AM. Lactate wnl. Alert and oriented on my eval. CXR with ? Infiltrate, more cough, feeling worse. Will start ABX for PNA, blood cultures, sputum culture    01/18/2025: Significant epistaxis and hemoptysis overnight.  CT with and without contrast did not reveal a source of bleed.  Still coughing with hemoptysis this morning.  On exam with obvious epistaxis, packed by ENT, resolved.  Coughing has improved.  Currently on Ventimask to avoid nasal cannula     01/19/2025: Doing better today, no more hemoptysis, less cough, no more epistaxis, hemoglobin up slightly.  Still on Ventimask to avoid nasal cannula but only requiring minimal quantities of oxygen, can take it off to eat.  Listed precautions yesterday evening for COVID given has been more than 10 days since diagnosis    1/20/25:  reporting abdominal pain when coughing. States she reported yesterday as well.     1/21/25 patient reports she is feeling better. Abdominal pain with movement. Audible wheezing noted. Duonebs added    1/23/25: adjusted medications for cough. Awaiting placement    1/22/25: patient with persistent cough. Chest x-ray stable. Changed Mucinex to Mucinex DM. Continue steroids, slow taper    Interval History: f/u resp failure no acute issues overnight. Persistent cough present    Review of Systems  Objective:     Vital Signs (Most Recent):  Temp: 98.3 °F (36.8 °C) (01/23/25 1200)  Pulse: 104 (01/23/25 1207)  Resp: 16 (01/23/25 1207)  BP: (!) 124/58 (01/23/25 1200)  SpO2: 96 % (01/23/25 1207) Vital Signs (24h Range):  Temp:  [98 °F (36.7 °C)-98.7 °F (37.1 °C)] 98.3 °F (36.8 °C)  Pulse:  [] 104  Resp:  [15-18] 16  SpO2:  [90 %-100 %] 96 %  BP: (124-168)/(57-72) 124/58     Weight: 50.4 kg (111 lb 1.8 oz)  Body mass index is 19.07 kg/m².    Intake/Output Summary (Last 24 hours) at 1/23/2025 1409  Last data filed at 1/23/2025 1316  Gross per 24 hour   Intake 240 ml   Output 200 ml   Net 40 ml         Physical Exam  HENT:      Head: Normocephalic and atraumatic.   Cardiovascular:      Rate and Rhythm: Normal rate and regular rhythm.      Heart sounds: No murmur heard.  Pulmonary:      Effort: Pulmonary effort is normal. No respiratory distress.      Breath sounds: Normal breath sounds. No wheezing.   Abdominal:      General: Bowel sounds are normal. There is no distension.      Palpations: Abdomen is soft.      Tenderness: There is no abdominal tenderness.   Musculoskeletal:         General: No swelling.   Skin:     General: Skin is warm and dry.   Neurological:      Mental Status: She is alert and oriented to person, place, and time. Mental status is at baseline.             Significant Labs: All pertinent labs within the past 24 hours have been reviewed.  Recent Lab Results         01/23/25  0551        Anion Gap 11        Baso # CANCELED  Comment: Result canceled by the ancillary.       Basophil % 0.0       BUN 43       Calcium 9.8       Chloride 106       CO2 21       Creatinine 1.2       Differential Method Manual       eGFR 46       Eos # CANCELED  Comment: Result canceled by the ancillary.       Eos % 0.0       Glucose 75       Gran % 74.0       Hematocrit 31.9       Hemoglobin 9.6       Immature Grans (Abs) CANCELED  Comment: Mild elevation in immature granulocytes is non specific and   can be seen in a variety of conditions including stress response,   acute inflammation, trauma and pregnancy. Correlation with other   laboratory and clinical findings is essential.    Result canceled by the ancillary.         Immature Granulocytes CANCELED  Comment: Result canceled by the ancillary.       Lymph # CANCELED  Comment: Result canceled by the ancillary.       Lymph % 21.0       Magnesium  2.1       MCH 30.6       MCHC 30.1              Mono # CANCELED  Comment: Result canceled by the ancillary.       Mono % 5.0       MPV 9.9       nRBC 0       Phosphorus Level 3.2       Platelet Estimate Appears normal       Platelet Count 281       Poikilocytosis Slight       Potassium 4.0       RBC 3.14       RDW 15.1       Sodium 138       Teardrop Cells Occasional       WBC 6.12               Significant Imaging: I have reviewed all pertinent imaging results/findings within the past 24 hours.    Assessment and Plan     * UIP (usual interstitial pneumonitis)  Pulm fibrosis hx- followed by pulmonology outpatient  Likely had flare of pulmonary fibrosis secondary to the COVID infection  This likely explains her continued symptoms, hypoxia, cough, etc.   We had been initially discussing whether patient would need to transitioned to hospice as she was requiring up to 10 L  Oxygen requirement has improved significantly,  Continue steroids, slow taper      Hyperkalemia  Hyperkalemia is likely due to  metabolic acidosis .The patients most recent  potassium results are listed below.  Recent Labs     01/21/25  0645 01/22/25  0449 01/23/25  0551   K 5.4* 4.5 4.0       Plan  - Monitor for arrhythmias with EKG and/or continuous telemetry.   - - Monitor potassium: Daily  - The patient's hyperkalemia is resolved            Atrial fibrillation  Patient with paroxysmal (<7 days) atrial fibrillation which is {:controlled currently without medication. Patient is currently in sinus rhythm.[id number] Score: 5. HASBLED Score: . Anticoagulation Patient is not anticoagulated due to bleeding risk .           Nasal septal deviation        Hemoptysis  Likely secondary to the epistaxis.  CT with and without contrast negative.  On TXA nebs but if no further hemoptysis may be able to stop soon.  Likely exacerbated by the coughing so adding cough syrup with codeine today in addition to her other antitussives. Improved significantly after addressing the epistaxis 01/18/2025      Hospital acquired PNA  -continue cefepime day 7    Encounter for palliative care  Palliative Care is following.  Receiving hydrocodone/acetaminophen p.r.n., morphine p.r.n., alprazolam p.r.n. for respiratory distress with good effect      Epistaxis  Likely secondary to supplemental O2, septal defect.  ENT packed the nose 1/18/25 and it seems to have resolved.  I think this is the cause of hemoptysis.  Pulm saw the patient as well and she is on tranexamic acid nebs although we do not feel the bleeding is coming from the lungs.  CT with and without contrast did not reveal a source of bleeding.  Hemoglobin has been dropping over the past few days but is not increasing after addressing the epistaxis.    -Mucomyst nasal spray to keep the septum moist    Acute on chronic respiratory failure with hypoxia  Patient with Hypoxic Respiratory failure which is Acute on chronic.  she is not on home oxygen. Supplemental oxygen was provided and noted- Oxygen Concentration (%):  [28] 28    .   Signs/symptoms of respiratory  "failure include- tachypnea, increased work of breathing, respiratory distress, use of accessory muscles, wheezing, stridor, and lethargy. Contributing diagnoses includes - Interstitial lung disease Labs and images were reviewed. Patient Has recent ABG, which has been reviewed. Will treat underlying causes and adjust management of respiratory failure as follows-   -will need new home oxygen evaluation  -continue steroids, slow taper                           CKD (chronic kidney disease)  Creatine stable for now. BMP reviewed- noted Estimated Creatinine Clearance: 27.4 mL/min (based on SCr of 1.4 mg/dL). according to latest data. Based on current GFR, CKD stage is stage 4 - GFR 15-29.  Monitor UOP and serial BMP and adjust therapy as needed. Renally dose meds. Avoid nephrotoxic medications and procedures.  -Creatinine 1.5 on admission however 3 months ago was 1.2    Recent Labs     01/20/25  0645 01/21/25  0645 01/22/25  0449   CREATININE 1.3 1.4 1.4         -Due to GFR, will need to dc ranexa for now  -Holding spironolactone, entresto  -Monitor creatinine      Chronic systolic heart failure  Patient has Diastolic (HFpEF) heart failure that is Chronic. On presentation their CHF was well compensated. Most recent BNP and echo results are listed below.  No results for input(s): "BNP" in the last 72 hours.    Latest ECHO  Results for orders placed during the hospital encounter of 06/02/23    Echo    Interpretation Summary  · The left ventricle is normal in size with concentric hypertrophy and low normal systolic function.  · The estimated ejection fraction is 50%.  · Normal left ventricular diastolic function.  · There is abnormal septal wall motion consistent with left bundle branch block.  · Normal right ventricular size with normal right ventricular systolic function.  · Normal central venous pressure (3 mmHg).  · The estimated PA systolic pressure is 30 mmHg.  · Mild mitral regurgitation.    Current Heart Failure " Medications  furosemide tablet 20 mg, Daily, Oral    Plan  - Monitor strict I&Os and daily weights.    - Place on telemetry  - Low sodium diet  - Place on fluid restriction of 1.5 L.   - Cardiology has not been consulted  - The patient's volume status is at their baseline  -         COVID-19  ** initially COVID positive on 12/31 on admission **   Patient is identified as Severe COVID-19 based on hypoxemia with O2 saturations <94% on room air or on ambulation   Initiated standard COVID protocols; COVID-19 testing ,Infection Control notification  and isolation- respiratory, contact and droplet per protocol    Management: Treated with targetedtherapy with Remdesivir, 200mg IV x1, followed by 100mg IV daily x5 days total and Anticoagulation, Patient admitted to non-critical care unit- Will initiate full dose anticoagulation with Weight based lovenox 1mg/kg IV q12, stopped on 1/11/25, Maintain oxygen saturations 92-96% via Nasal Cannula  and monitor with continuous/intermittent pulse oximetry. , and Continuous cardiac monitoring.    As of 01/18/2025 DC'd  precautions as it has been more than 10 days since onset, and her persistent symptoms are likely a flare of her interstitial lung disease/postviral pneumonia     Mixed hyperlipidemia  Lab Results   Component Value Date    CHOL 112 (L) 07/12/2024    HDL 41 07/12/2024    LDLCALC 49.8 (L) 07/12/2024    TRIG 106 07/12/2024     -Continue Statin    Fall  Fall night of 1/15 with head strike   Evaluated by nighttime hospitalist   Head CT was negative   Neuro exam on 01/16 is normal   Fall precautions  Continue therapy      Normocytic anemia  Anemia is likely due to chronic disease due to Chronic Kidney Disease. Most recent hemoglobin and hematocrit are listed below.  Recent Labs     01/21/25  0920 01/22/25  0449 01/23/25  0551   HGB 9.0* 8.8* 9.6*   HCT 29.0* 29.3* 31.9*       Plan  - Monitor serial CBC: Daily  - Transfuse PRBC if patient becomes hemodynamically unstable,  symptomatic or H/H drops below 7/21.  - Patient has not received any PRBC transfusions to date  - Patient's anemia is currently stable      Seizure  Hx of seizures, patient has not had a seizure during this admission.   At home is on seizure medication Aptiom 400mg daily and Keppra 1,000mg BID  Aptiom likely worsening hyponatremia and also inhibiting SNF placement due to cost, so discussed with neuro on call  Per neuro on call, would DC Aptiom and increase Keppra to 1,500mg BID, change made  Can follow up with neuro as outpatient to revisit        Coronary artery disease of native artery of native heart with stable angina pectoris  Patient with known CAD  No anginal signs or symptoms   On Plavix and statin as an outpatient   Holding Plavix currently for the epistaxis, will resume in couple of days     COPD exacerbation  Scheduled Duonebs  Continue oral and inhaled steroids  LABA    GERD (gastroesophageal reflux disease)  -Continue PPI      Nasal septal perforation  Was likely contributing to the epistaxis   Seen by ENT   Packing in place   No further bleeding   Mucomyst nasal spray to the septal defect      VTE Risk Mitigation (From admission, onward)      None            Discharge Planning   KATHERINE:      Code Status: DNR   Medical Readiness for Discharge Date:   Discharge Plan A: Skilled Nursing Facility   Discharge Delays: None known at this time            Please place Justification for DME        Yoselin Rodriguez MD  Department of Hospital Medicine   [facility] - Telemetry ([facility])

## 2025-01-23 NOTE — SUBJECTIVE & OBJECTIVE
Interval History: f/u resp failure no acute issues overnight. Persistent cough present    Review of Systems  Objective:     Vital Signs (Most Recent):  Temp: 98.3 °F (36.8 °C) (01/23/25 1200)  Pulse: 104 (01/23/25 1207)  Resp: 16 (01/23/25 1207)  BP: (!) 124/58 (01/23/25 1200)  SpO2: 96 % (01/23/25 1207) Vital Signs (24h Range):  Temp:  [98 °F (36.7 °C)-98.7 °F (37.1 °C)] 98.3 °F (36.8 °C)  Pulse:  [] 104  Resp:  [15-18] 16  SpO2:  [90 %-100 %] 96 %  BP: (124-168)/(57-72) 124/58     Weight: 50.4 kg (111 lb 1.8 oz)  Body mass index is 19.07 kg/m².    Intake/Output Summary (Last 24 hours) at 1/23/2025 1409  Last data filed at 1/23/2025 1316  Gross per 24 hour   Intake 240 ml   Output 200 ml   Net 40 ml         Physical Exam  HENT:      Head: Normocephalic and atraumatic.   Cardiovascular:      Rate and Rhythm: Normal rate and regular rhythm.      Heart sounds: No murmur heard.  Pulmonary:      Effort: Pulmonary effort is normal. No respiratory distress.      Breath sounds: Normal breath sounds. No wheezing.   Abdominal:      General: Bowel sounds are normal. There is no distension.      Palpations: Abdomen is soft.      Tenderness: There is no abdominal tenderness.   Musculoskeletal:         General: No swelling.   Skin:     General: Skin is warm and dry.   Neurological:      Mental Status: She is alert and oriented to person, place, and time. Mental status is at baseline.             Significant Labs: All pertinent labs within the past 24 hours have been reviewed.  Recent Lab Results         01/23/25  0551        Anion Gap 11       Baso # CANCELED  Comment: Result canceled by the ancillary.       Basophil % 0.0       BUN 43       Calcium 9.8       Chloride 106       CO2 21       Creatinine 1.2       Differential Method Manual       eGFR 46       Eos # CANCELED  Comment: Result canceled by the ancillary.       Eos % 0.0       Glucose 75       Gran % 74.0       Hematocrit 31.9       Hemoglobin 9.6       Immature  Carl (Abs) CANCELED  Comment: Mild elevation in immature granulocytes is non specific and   can be seen in a variety of conditions including stress response,   acute inflammation, trauma and pregnancy. Correlation with other   laboratory and clinical findings is essential.    Result canceled by the ancillary.         Immature Granulocytes CANCELED  Comment: Result canceled by the ancillary.       Lymph # CANCELED  Comment: Result canceled by the ancillary.       Lymph % 21.0       Magnesium  2.1       MCH 30.6       MCHC 30.1              Mono # CANCELED  Comment: Result canceled by the ancillary.       Mono % 5.0       MPV 9.9       nRBC 0       Phosphorus Level 3.2       Platelet Estimate Appears normal       Platelet Count 281       Poikilocytosis Slight       Potassium 4.0       RBC 3.14       RDW 15.1       Sodium 138       Teardrop Cells Occasional       WBC 6.12               Significant Imaging: I have reviewed all pertinent imaging results/findings within the past 24 hours.

## 2025-01-23 NOTE — ASSESSMENT & PLAN NOTE
Anemia is likely due to chronic disease due to Chronic Kidney Disease. Most recent hemoglobin and hematocrit are listed below.  Recent Labs     01/21/25  0920 01/22/25  0449 01/23/25  0551   HGB 9.0* 8.8* 9.6*   HCT 29.0* 29.3* 31.9*       Plan  - Monitor serial CBC: Daily  - Transfuse PRBC if patient becomes hemodynamically unstable, symptomatic or H/H drops below 7/21.  - Patient has not received any PRBC transfusions to date  - Patient's anemia is currently stable

## 2025-01-24 LAB
ANION GAP SERPL CALC-SCNC: 8 MMOL/L (ref 8–16)
ANISOCYTOSIS BLD QL SMEAR: SLIGHT
BASOPHILS # BLD AUTO: ABNORMAL K/UL (ref 0–0.2)
BASOPHILS NFR BLD: 0 % (ref 0–1.9)
BUN SERPL-MCNC: 43 MG/DL (ref 8–23)
CALCIUM SERPL-MCNC: 9.1 MG/DL (ref 8.7–10.5)
CHLORIDE SERPL-SCNC: 110 MMOL/L (ref 95–110)
CO2 SERPL-SCNC: 20 MMOL/L (ref 23–29)
CREAT SERPL-MCNC: 1.2 MG/DL (ref 0.5–1.4)
DIFFERENTIAL METHOD BLD: ABNORMAL
EOSINOPHIL # BLD AUTO: ABNORMAL K/UL (ref 0–0.5)
EOSINOPHIL NFR BLD: 3 % (ref 0–8)
ERYTHROCYTE [DISTWIDTH] IN BLOOD BY AUTOMATED COUNT: 15.3 % (ref 11.5–14.5)
EST. GFR  (NO RACE VARIABLE): 46 ML/MIN/1.73 M^2
GLUCOSE SERPL-MCNC: 106 MG/DL (ref 70–110)
HCT VFR BLD AUTO: 27.4 % (ref 37–48.5)
HGB BLD-MCNC: 8.3 G/DL (ref 12–16)
IMM GRANULOCYTES # BLD AUTO: ABNORMAL K/UL (ref 0–0.04)
IMM GRANULOCYTES NFR BLD AUTO: ABNORMAL % (ref 0–0.5)
LYMPHOCYTES # BLD AUTO: ABNORMAL K/UL (ref 1–4.8)
LYMPHOCYTES NFR BLD: 27 % (ref 18–48)
MAGNESIUM SERPL-MCNC: 2.1 MG/DL (ref 1.6–2.6)
MCH RBC QN AUTO: 30.9 PG (ref 27–31)
MCHC RBC AUTO-ENTMCNC: 30.3 G/DL (ref 32–36)
MCV RBC AUTO: 102 FL (ref 82–98)
METAMYELOCYTES NFR BLD MANUAL: 2 %
MONOCYTES # BLD AUTO: ABNORMAL K/UL (ref 0.3–1)
MONOCYTES NFR BLD: 11 % (ref 4–15)
NEUTROPHILS NFR BLD: 57 % (ref 38–73)
NRBC BLD-RTO: 0 /100 WBC
OVALOCYTES BLD QL SMEAR: ABNORMAL
PHOSPHATE SERPL-MCNC: 2.4 MG/DL (ref 2.7–4.5)
PLATELET # BLD AUTO: 274 K/UL (ref 150–450)
PLATELET BLD QL SMEAR: ABNORMAL
PMV BLD AUTO: 9.7 FL (ref 9.2–12.9)
POIKILOCYTOSIS BLD QL SMEAR: SLIGHT
POLYCHROMASIA BLD QL SMEAR: ABNORMAL
POTASSIUM SERPL-SCNC: 4.2 MMOL/L (ref 3.5–5.1)
RBC # BLD AUTO: 2.69 M/UL (ref 4–5.4)
SODIUM SERPL-SCNC: 138 MMOL/L (ref 136–145)
WBC # BLD AUTO: 5.75 K/UL (ref 3.9–12.7)

## 2025-01-24 PROCEDURE — 80048 BASIC METABOLIC PNL TOTAL CA: CPT | Performed by: STUDENT IN AN ORGANIZED HEALTH CARE EDUCATION/TRAINING PROGRAM

## 2025-01-24 PROCEDURE — 25000242 PHARM REV CODE 250 ALT 637 W/ HCPCS: Performed by: STUDENT IN AN ORGANIZED HEALTH CARE EDUCATION/TRAINING PROGRAM

## 2025-01-24 PROCEDURE — 85007 BL SMEAR W/DIFF WBC COUNT: CPT | Performed by: STUDENT IN AN ORGANIZED HEALTH CARE EDUCATION/TRAINING PROGRAM

## 2025-01-24 PROCEDURE — 97530 THERAPEUTIC ACTIVITIES: CPT

## 2025-01-24 PROCEDURE — 63600175 PHARM REV CODE 636 W HCPCS: Performed by: INTERNAL MEDICINE

## 2025-01-24 PROCEDURE — 21400001 HC TELEMETRY ROOM

## 2025-01-24 PROCEDURE — 84100 ASSAY OF PHOSPHORUS: CPT | Performed by: STUDENT IN AN ORGANIZED HEALTH CARE EDUCATION/TRAINING PROGRAM

## 2025-01-24 PROCEDURE — 25000003 PHARM REV CODE 250: Performed by: STUDENT IN AN ORGANIZED HEALTH CARE EDUCATION/TRAINING PROGRAM

## 2025-01-24 PROCEDURE — 27000221 HC OXYGEN, UP TO 24 HOURS

## 2025-01-24 PROCEDURE — 83735 ASSAY OF MAGNESIUM: CPT | Performed by: STUDENT IN AN ORGANIZED HEALTH CARE EDUCATION/TRAINING PROGRAM

## 2025-01-24 PROCEDURE — 97110 THERAPEUTIC EXERCISES: CPT

## 2025-01-24 PROCEDURE — 94640 AIRWAY INHALATION TREATMENT: CPT

## 2025-01-24 PROCEDURE — 85027 COMPLETE CBC AUTOMATED: CPT | Performed by: STUDENT IN AN ORGANIZED HEALTH CARE EDUCATION/TRAINING PROGRAM

## 2025-01-24 PROCEDURE — 25000003 PHARM REV CODE 250: Performed by: NURSE PRACTITIONER

## 2025-01-24 PROCEDURE — 25000242 PHARM REV CODE 250 ALT 637 W/ HCPCS: Performed by: INTERNAL MEDICINE

## 2025-01-24 PROCEDURE — 99900035 HC TECH TIME PER 15 MIN (STAT)

## 2025-01-24 PROCEDURE — 25000003 PHARM REV CODE 250: Performed by: INTERNAL MEDICINE

## 2025-01-24 PROCEDURE — 94761 N-INVAS EAR/PLS OXIMETRY MLT: CPT

## 2025-01-24 RX ORDER — MORPHINE SULFATE 2 MG/ML
2 INJECTION, SOLUTION INTRAMUSCULAR; INTRAVENOUS EVERY 4 HOURS PRN
Status: DISCONTINUED | OUTPATIENT
Start: 2025-01-24 | End: 2025-01-28 | Stop reason: HOSPADM

## 2025-01-24 RX ADMIN — FUROSEMIDE 20 MG: 20 TABLET ORAL at 08:01

## 2025-01-24 RX ADMIN — LEVETIRACETAM 1500 MG: 500 TABLET, FILM COATED ORAL at 08:01

## 2025-01-24 RX ADMIN — SALINE NASAL SPRAY 1 SPRAY: 1.5 SOLUTION NASAL at 08:01

## 2025-01-24 RX ADMIN — BUDESONIDE INHALATION 0.5 MG: 0.5 SUSPENSION RESPIRATORY (INHALATION) at 08:01

## 2025-01-24 RX ADMIN — THERA TABS 1 TABLET: TAB at 08:01

## 2025-01-24 RX ADMIN — ESCITALOPRAM OXALATE 20 MG: 10 TABLET, FILM COATED ORAL at 08:01

## 2025-01-24 RX ADMIN — MONTELUKAST 10 MG: 10 TABLET, FILM COATED ORAL at 08:01

## 2025-01-24 RX ADMIN — IPRATROPIUM BROMIDE AND ALBUTEROL SULFATE 3 ML: 2.5; .5 SOLUTION RESPIRATORY (INHALATION) at 08:01

## 2025-01-24 RX ADMIN — IPRATROPIUM BROMIDE AND ALBUTEROL SULFATE 3 ML: 2.5; .5 SOLUTION RESPIRATORY (INHALATION) at 12:01

## 2025-01-24 RX ADMIN — OXYCODONE HYDROCHLORIDE AND ACETAMINOPHEN 500 MG: 500 TABLET ORAL at 08:01

## 2025-01-24 RX ADMIN — ALPRAZOLAM 0.25 MG: 0.25 TABLET ORAL at 12:01

## 2025-01-24 RX ADMIN — ARFORMOTEROL TARTRATE 15 MCG: 15 SOLUTION RESPIRATORY (INHALATION) at 08:01

## 2025-01-24 RX ADMIN — ALPRAZOLAM 0.25 MG: 0.25 TABLET ORAL at 01:01

## 2025-01-24 RX ADMIN — ALPRAZOLAM 0.25 MG: 0.25 TABLET ORAL at 10:01

## 2025-01-24 RX ADMIN — GABAPENTIN 300 MG: 300 CAPSULE ORAL at 08:01

## 2025-01-24 RX ADMIN — PREDNISONE 20 MG: 20 TABLET ORAL at 08:01

## 2025-01-24 RX ADMIN — GUAIFENESIN AND DEXTROMETHORPHAN HYDROBROMIDE 2 TABLET: 600; 30 TABLET, EXTENDED RELEASE ORAL at 08:01

## 2025-01-24 RX ADMIN — BENZONATATE 200 MG: 100 CAPSULE ORAL at 08:01

## 2025-01-24 RX ADMIN — PANTOPRAZOLE SODIUM 40 MG: 40 TABLET, DELAYED RELEASE ORAL at 08:01

## 2025-01-24 RX ADMIN — BENZONATATE 200 MG: 100 CAPSULE ORAL at 01:01

## 2025-01-24 RX ADMIN — ATORVASTATIN CALCIUM 40 MG: 40 TABLET, FILM COATED ORAL at 08:01

## 2025-01-24 NOTE — PROGRESS NOTES
[facility] Medicine  Progress Note    Patient Name: Shireen Felix  MRN: [id number]  Patient Class: IP- Inpatient   Admission Date: 12/31/2024  Length of Stay: 23 days  Attending Physician: Yoselin Rodriguez MD  Primary Care Provider: Laron Chaudhari MD        Subjective     Principal Problem:UIP (usual interstitial pneumonitis)        HPI:  Shireen Felix is a 79 year old female who has  has a past medical history of Abnormal ECG, Acute bronchitis and bronchiolitis, Aneurysm, Anticoagulant long-term use, Arthritis, CAD in native artery, COPD (chronic obstructive pulmonary disease), Diabetes mellitus, Fall, Glaucoma, Hypertension, Seizures, Shingles, and Stroke who presented to Emergency Department for evaluation for cough. Associated symptoms include: congestion, wheezing, and SOB. She was seen by her pulmonologist today, Dr. Sam. Started as sinus pain and drainage and now has progressed to respiratory symptoms. She was seen a few days ago in the ER given prescription for antibiotics steroids but these have not helped much. Today in the office she can barely talk without coughing continually. She is having a difficult time putting more than 3 words together without severe cough paroxysms. ED workup showed Hgb/Hct 11.9/37.0, K+ 3.1, CO2 16, BUN/creatinine 34/1.5. CT chest w/out contrast stable suspected pulmonary fibrosis of the lungs. No acute infiltrates. She has received DuoNeb x 1, solumedrol 125 mg IV x 1, KCL 40 mEq PO x 1 and NS 1L bolus. Pt admitted for COVID.     Overview/Hospital Course:  Admitted to Hospital Medicine for acute on chronic respiratory failure concerns in setting of COVID-19.  Started on IV methylprednisolone, remdesivir, scheduled respiratory treatments. 01/04: Reported left lower quadrant abdominal pain with tenderness to palpation, will evaluate via CT as patient is on therapeutic anticoagulation for COVID-19.  CT abdomen/pelvis nonacute.  01/05:  Reported improvements in respiratory status with decreased frequency of cough and dyspnea but still reporting significant symptoms from baseline.  On 1/6, patient had a significant event with acute tachypnea, tachycardia. Pulmonology consulted, orders adjusted. Patient stable overnight, had another event on 1/7 similar to previous day. Possible exacerbation related to anxiety provoked by medications and congestion. Case reviewed, orders adjusted. Per Pulmonology, due to underlying lung fibrosis, patient is likely not benefiting from bronchodilators. Stopped on 1/7, patient did not experience any further episodes of anxiety, respiratory distress. Will continue xanax and prn morphine. Patient with epistaxis, small clots in tissue noted. Reduced supplemental O2 to 1L at rest, stopped full dose lovenox and placed on ppx dose. Hgb stable.   Difficulty with discharge plan, patient originally planned to d/c to SNF, now prefers to d/c home.  Pulmonology discussed home palliative care vs hospice.  Patient and family wish to discuss the Services further.  Per discussion with patient, family will be available for an informational visit on Monday.  Educated patient on results of oxygen evaluation, advised patient to reduce oxygen to 2 L or less at rest, requires 4 L with exertion.  As clinically indicated if significant improvement noted will repeat oxygen evaluation.     01/12/2025  O2 sats stable on 2L (while at rest). Patient and family wants to try and go home, but unable to participate with PT over weekend. Repeat desat study on Monday. May have to consider palliative measures if progress plateaus.      01/13/2025  Feeling slightly better today than she felt a few days ago but overall still significantly short of breath with exertion.  Discussed with her, palliative Care, and her family.  She does not want to change to hospice at this time.  She wants to try to get better.  She does want to be DNR though.   Appreciate further palliative care involvement.  Still desaturating significantly with exertion and unable to work with PT because of this    01/14/2025  Feeling perhaps slightly better today, attempting to work with physical therapy.  We are hoping that we could either get her to rehab if her oxygen requirements with exertion go down, or potentially get a bigger concentrator at home for increasing her up to 10 L with exertion at home.  Otherwise no changes to the plan    01/15/2025:  Still having coughing spells but notably improved.  Oxygen is being down titrated and she even got a room air today.  We are screening her for rehabs    01/16/2025:  Coughing she states is much worse when oxygen is off.  She was on room air but I put her on 1 L to see if there is truly an effect.  She is still being screened for rehabs.  Overall she looks much better.  She did technically have a fever today to 101.4 but when I evaluate her she looks notably better, CXR is negative for acute change, would not recommend starting antibiotics and she has defervesced    1/17/25: Somnolent this AM, spiking more fevers yesterday evening, and mildly soft BP this AM. Lactate wnl. Alert and oriented on my eval. CXR with ? Infiltrate, more cough, feeling worse. Will start ABX for PNA, blood cultures, sputum culture    01/18/2025: Significant epistaxis and hemoptysis overnight.  CT with and without contrast did not reveal a source of bleed.  Still coughing with hemoptysis this morning.  On exam with obvious epistaxis, packed by ENT, resolved.  Coughing has improved.  Currently on Ventimask to avoid nasal cannula     01/19/2025: Doing better today, no more hemoptysis, less cough, no more epistaxis, hemoglobin up slightly.  Still on Ventimask to avoid nasal cannula but only requiring minimal quantities of oxygen, can take it off to eat.  Listed precautions yesterday evening for COVID given has been more than 10 days since diagnosis    1/20/25:  reporting abdominal pain when coughing. States she reported yesterday as well.     1/21/25 patient reports she is feeling better. Abdominal pain with movement. Audible wheezing noted. Duoneuyen added    1/22/25: adjusted medications for cough. Awaiting placement    1/23/25: patient with persistent cough. Chest x-ray stable. Changed Mucinex to Mucinex DM. Continue steroids, slow taper    1/24/25: patient asking about home oxygen. Currently using venti mask for comfort. Home oxygen eval ordered again. Awaiting placement.    Interval History: f/u resp failure no acute issues overnight. Awaiting placement    Review of Systems  Objective:     Vital Signs (Most Recent):  Temp: 97.3 °F (36.3 °C) (01/24/25 0825)  Pulse: (!) 115 (01/24/25 1111)  Resp: 19 (01/24/25 0825)  BP: (!) 146/67 (01/24/25 0825)  SpO2: 97 % (01/24/25 0525) Vital Signs (24h Range):  Temp:  [97.3 °F (36.3 °C)-98.7 °F (37.1 °C)] 97.3 °F (36.3 °C)  Pulse:  [] 115  Resp:  [16-20] 19  SpO2:  [93 %-100 %] 97 %  BP: (118-146)/(57-67) 146/67     Weight: 53.6 kg (118 lb 2.7 oz)  Body mass index is 20.28 kg/m².    Intake/Output Summary (Last 24 hours) at 1/24/2025 1131  Last data filed at 1/23/2025 1316  Gross per 24 hour   Intake 240 ml   Output 200 ml   Net 40 ml         Physical Exam  HENT:      Head: Normocephalic and atraumatic.   Cardiovascular:      Rate and Rhythm: Normal rate and regular rhythm.      Heart sounds: No murmur heard.  Pulmonary:      Effort: Pulmonary effort is normal. No respiratory distress.      Breath sounds: Normal breath sounds. No wheezing.   Abdominal:      General: Bowel sounds are normal. There is no distension.      Palpations: Abdomen is soft.      Tenderness: There is no abdominal tenderness.   Musculoskeletal:         General: No swelling.   Skin:     General: Skin is warm and dry.   Neurological:      Mental Status: She is alert and oriented to person, place, and time. Mental status is at baseline.      Motor: Weakness  present.             Significant Labs: All pertinent labs within the past 24 hours have been reviewed.  Recent Lab Results         01/24/25  0514        Anion Gap 8       Aniso Slight       Baso # CANCELED  Comment: Result canceled by the ancillary.       Basophil % 0.0       BUN 43       Calcium 9.1       Chloride 110       CO2 20       Creatinine 1.2       Differential Method Manual       eGFR 46       Eos # CANCELED  Comment: Result canceled by the ancillary.       Eos % 3.0       Glucose 106       Gran % 57.0       Hematocrit 27.4       Hemoglobin 8.3       Immature Grans (Abs) CANCELED  Comment: Mild elevation in immature granulocytes is non specific and   can be seen in a variety of conditions including stress response,   acute inflammation, trauma and pregnancy. Correlation with other   laboratory and clinical findings is essential.    Result canceled by the ancillary.         Immature Granulocytes CANCELED  Comment: Result canceled by the ancillary.       Lymph # CANCELED  Comment: Result canceled by the ancillary.       Lymph % 27.0       Magnesium  2.1       MCH 30.9       MCHC 30.3              Metamyelocytes 2.0       Mono # CANCELED  Comment: Result canceled by the ancillary.       Mono % 11.0       MPV 9.7       nRBC 0       Ovalocytes Occasional       Phosphorus Level 2.4       Platelet Estimate Appears normal       Platelet Count 274       Poikilocytosis Slight       Poly Occasional       Potassium 4.2       RBC 2.69       RDW 15.3       Sodium 138       WBC 5.75               Significant Imaging: I have reviewed all pertinent imaging results/findings within the past 24 hours.    Assessment and Plan     * UIP (usual interstitial pneumonitis)  Pulm fibrosis hx- followed by pulmonology outpatient  Likely had flare of pulmonary fibrosis secondary to the COVID infection  This likely explains her continued symptoms, hypoxia, cough, etc.   We had been initially discussing whether patient would need  to transitioned to hospice as she was requiring up to 10 L  Oxygen requirement has improved significantly,  Continue steroids, slow taper      Hyperkalemia  Hyperkalemia is likely due to  metabolic acidosis .The patients most recent potassium results are listed below.  Recent Labs     01/21/25  0645 01/22/25  0449 01/23/25  0551   K 5.4* 4.5 4.0       Plan  - Monitor for arrhythmias with EKG and/or continuous telemetry.   - - Monitor potassium: Daily  - The patient's hyperkalemia is resolved            Atrial fibrillation  Patient with paroxysmal (<7 days) atrial fibrillation which is {:controlled currently without medication. Patient is currently in sinus rhythm.[id number] Score: 5. HASBLED Score: . Anticoagulation Patient is not anticoagulated due to bleeding risk .           Nasal septal deviation        Hemoptysis  Likely secondary to the epistaxis.  CT with and without contrast negative.  On TXA nebs but if no further hemoptysis may be able to stop soon.  Likely exacerbated by the coughing so adding cough syrup with codeine today in addition to her other antitussives. Improved significantly after addressing the epistaxis 01/18/2025      Hospital acquired PNA  -completed course of cefepime    Encounter for palliative care  Palliative Care is following.  Receiving hydrocodone/acetaminophen p.r.n., morphine p.r.n., alprazolam p.r.n. for respiratory distress with good effect      Epistaxis  Likely secondary to supplemental O2, septal defect.  ENT packed the nose 1/18/25 and it seems to have resolved.  I think this is the cause of hemoptysis.  Pulm saw the patient as well and she is on tranexamic acid nebs although we do not feel the bleeding is coming from the lungs.  CT with and without contrast did not reveal a source of bleeding.  Hemoglobin has been dropping over the past few days but is not increasing after addressing the epistaxis.    -Mucomyst nasal spray to keep the septum moist    Acute on chronic  "respiratory failure with hypoxia  Patient with Hypoxic Respiratory failure which is Acute on chronic.  she is not on home oxygen. Supplemental oxygen was provided and noted- Oxygen Concentration (%):  [28] 28    .   Signs/symptoms of respiratory failure include- tachypnea, increased work of breathing, respiratory distress, use of accessory muscles, wheezing, stridor, and lethargy. Contributing diagnoses includes - Interstitial lung disease Labs and images were reviewed. Patient Has recent ABG, which has been reviewed. Will treat underlying causes and adjust management of respiratory failure as follows-   -will need new home oxygen evaluation  -continue steroids, slow taper                           CKD (chronic kidney disease)  Creatine stable for now. BMP reviewed- noted Estimated Creatinine Clearance: 31.6 mL/min (based on SCr of 1.2 mg/dL). according to latest data. Based on current GFR, CKD stage is stage 4 - GFR 15-29.  Monitor UOP and serial BMP and adjust therapy as needed. Renally dose meds. Avoid nephrotoxic medications and procedures.  -Creatinine 1.5 on admission however 3 months ago was 1.2    Recent Labs     01/22/25  0449 01/23/25  0551 01/24/25  0514   CREATININE 1.4 1.2 1.2         -Due to GFR, will need to dc ranexa for now  -Holding spironolactone, entresto  -Monitor creatinine      Chronic systolic heart failure  Patient has Diastolic (HFpEF) heart failure that is Chronic. On presentation their CHF was well compensated. Most recent BNP and echo results are listed below.  No results for input(s): "BNP" in the last 72 hours.    Latest ECHO  Results for orders placed during the hospital encounter of 06/02/23    Echo    Interpretation Summary  · The left ventricle is normal in size with concentric hypertrophy and low normal systolic function.  · The estimated ejection fraction is 50%.  · Normal left ventricular diastolic function.  · There is abnormal septal wall motion consistent with left bundle " branch block.  · Normal right ventricular size with normal right ventricular systolic function.  · Normal central venous pressure (3 mmHg).  · The estimated PA systolic pressure is 30 mmHg.  · Mild mitral regurgitation.    Current Heart Failure Medications  furosemide tablet 20 mg, Daily, Oral    Plan  - Monitor strict I&Os and daily weights.    - Place on telemetry  - Low sodium diet  - Place on fluid restriction of 1.5 L.   - Cardiology has not been consulted  - The patient's volume status is at their baseline  -         COVID-19  ** initially COVID positive on 12/31 on admission **   Patient is identified as Severe COVID-19 based on hypoxemia with O2 saturations <94% on room air or on ambulation   Initiated standard COVID protocols; COVID-19 testing ,Infection Control notification  and isolation- respiratory, contact and droplet per protocol    Management: Treated with targetedtherapy with Remdesivir, 200mg IV x1, followed by 100mg IV daily x5 days total and Anticoagulation, Patient admitted to non-critical care unit- Will initiate full dose anticoagulation with Weight based lovenox 1mg/kg IV q12, stopped on 1/11/25, Maintain oxygen saturations 92-96% via Nasal Cannula  and monitor with continuous/intermittent pulse oximetry. , and Continuous cardiac monitoring.    As of 01/18/2025 DC'd  precautions as it has been more than 10 days since onset, and her persistent symptoms are likely a flare of her interstitial lung disease/postviral pneumonia     Mixed hyperlipidemia  Lab Results   Component Value Date    CHOL 112 (L) 07/12/2024    HDL 41 07/12/2024    LDLCALC 49.8 (L) 07/12/2024    TRIG 106 07/12/2024     -Continue Statin    Fall  Fall night of 1/15 with head strike   Evaluated by nighttime hospitalist   Head CT was negative   Neuro exam on 01/16 is normal   Fall precautions  Continue therapy      Normocytic anemia  Anemia is likely due to chronic disease due to Chronic Kidney Disease. Most recent hemoglobin and  hematocrit are listed below.  Recent Labs     01/22/25  0449 01/23/25  0551 01/24/25  0514   HGB 8.8* 9.6* 8.3*   HCT 29.3* 31.9* 27.4*       Plan  - Monitor serial CBC: Daily  - Transfuse PRBC if patient becomes hemodynamically unstable, symptomatic or H/H drops below 7/21.  - Patient has not received any PRBC transfusions to date  - Patient's anemia is currently stable      Seizure  Hx of seizures, patient has not had a seizure during this admission.   At home is on seizure medication Aptiom 400mg daily and Keppra 1,000mg BID  Aptiom likely worsening hyponatremia and also inhibiting SNF placement due to cost, so discussed with neuro on call  Per neuro on call, would DC Aptiom and increase Keppra to 1,500mg BID, change made  Can follow up with neuro as outpatient to revisit        Coronary artery disease of native artery of native heart with stable angina pectoris  Patient with known CAD  No anginal signs or symptoms   On Plavix and statin as an outpatient   Holding Plavix currently for the epistaxis, will resume in couple of days     COPD exacerbation  Scheduled Duonebs  Continue oral and inhaled steroids  LABA    GERD (gastroesophageal reflux disease)  -Continue PPI      Nasal septal perforation  Was likely contributing to the epistaxis   Seen by ENT   Packing in place   No further bleeding   Mucomyst nasal spray to the septal defect      VTE Risk Mitigation (From admission, onward)      None            Discharge Planning   KATHERINE:      Code Status: DNR   Medical Readiness for Discharge Date:   Discharge Plan A: Skilled Nursing Facility   Discharge Delays: None known at this time            Please place Justification for DME        Yoselin Rodriguez MD  Department of Hospital Medicine   [facility] - Telemetry ([facility])

## 2025-01-24 NOTE — RESPIRATORY THERAPY
Home Oxygen Evaluation    Date Performed: 2025    1) Patient's Home O2 Sat on room air, while at rest: 99%        If O2 sats on room air at rest are 88% or below, patient qualifies. No additional testing needed. Document N/A in steps 2 and 3. If 89% or above, complete steps 2.      2) Patient's O2 Sat on room air while exercisin%        If O2 sats on room air while exercising remain 89% or above patient does not qualify, no further testing needed Document N/A in step 3. If O2 sats on room air while exercising are 88% or below, continue to step 3.      3) Patient's O2 Sat while exercising on O2: na at  LPM         (Must show improvement from #2 for patients to qualify)    If O2 sats improve on oxygen, patient qualifies for portable oxygen. If not, the patient does not qualify.

## 2025-01-24 NOTE — SUBJECTIVE & OBJECTIVE
Interval History: f/u resp failure no acute issues overnight. Awaiting placement    Review of Systems  Objective:     Vital Signs (Most Recent):  Temp: 97.3 °F (36.3 °C) (01/24/25 0825)  Pulse: (!) 115 (01/24/25 1111)  Resp: 19 (01/24/25 0825)  BP: (!) 146/67 (01/24/25 0825)  SpO2: 97 % (01/24/25 0525) Vital Signs (24h Range):  Temp:  [97.3 °F (36.3 °C)-98.7 °F (37.1 °C)] 97.3 °F (36.3 °C)  Pulse:  [] 115  Resp:  [16-20] 19  SpO2:  [93 %-100 %] 97 %  BP: (118-146)/(57-67) 146/67     Weight: 53.6 kg (118 lb 2.7 oz)  Body mass index is 20.28 kg/m².    Intake/Output Summary (Last 24 hours) at 1/24/2025 1131  Last data filed at 1/23/2025 1316  Gross per 24 hour   Intake 240 ml   Output 200 ml   Net 40 ml         Physical Exam  HENT:      Head: Normocephalic and atraumatic.   Cardiovascular:      Rate and Rhythm: Normal rate and regular rhythm.      Heart sounds: No murmur heard.  Pulmonary:      Effort: Pulmonary effort is normal. No respiratory distress.      Breath sounds: Normal breath sounds. No wheezing.   Abdominal:      General: Bowel sounds are normal. There is no distension.      Palpations: Abdomen is soft.      Tenderness: There is no abdominal tenderness.   Musculoskeletal:         General: No swelling.   Skin:     General: Skin is warm and dry.   Neurological:      Mental Status: She is alert and oriented to person, place, and time. Mental status is at baseline.      Motor: Weakness present.             Significant Labs: All pertinent labs within the past 24 hours have been reviewed.  Recent Lab Results         01/24/25  0514        Anion Gap 8       Aniso Slight       Baso # CANCELED  Comment: Result canceled by the ancillary.       Basophil % 0.0       BUN 43       Calcium 9.1       Chloride 110       CO2 20       Creatinine 1.2       Differential Method Manual       eGFR 46       Eos # CANCELED  Comment: Result canceled by the ancillary.       Eos % 3.0       Glucose 106       Gran % 57.0        Hematocrit 27.4       Hemoglobin 8.3       Immature Grans (Abs) CANCELED  Comment: Mild elevation in immature granulocytes is non specific and   can be seen in a variety of conditions including stress response,   acute inflammation, trauma and pregnancy. Correlation with other   laboratory and clinical findings is essential.    Result canceled by the ancillary.         Immature Granulocytes CANCELED  Comment: Result canceled by the ancillary.       Lymph # CANCELED  Comment: Result canceled by the ancillary.       Lymph % 27.0       Magnesium  2.1       MCH 30.9       MCHC 30.3              Metamyelocytes 2.0       Mono # CANCELED  Comment: Result canceled by the ancillary.       Mono % 11.0       MPV 9.7       nRBC 0       Ovalocytes Occasional       Phosphorus Level 2.4       Platelet Estimate Appears normal       Platelet Count 274       Poikilocytosis Slight       Poly Occasional       Potassium 4.2       RBC 2.69       RDW 15.3       Sodium 138       WBC 5.75               Significant Imaging: I have reviewed all pertinent imaging results/findings within the past 24 hours.

## 2025-01-24 NOTE — PT/OT/SLP PROGRESS
Occupational Therapy   Treatment    Name: Shireen Felix  MRN: [id number]  Admitting Diagnosis:  UIP (usual interstitial pneumonitis)       Recommendations:     Discharge Recommendations: Moderate Intensity Therapy  Discharge Equipment Recommendations:  to be determined by next level of care  Barriers to discharge:  Decreased caregiver support    Assessment:     Shireen Felix is a 79 y.o. female with a medical diagnosis of UIP (usual interstitial pneumonitis).  She presents with the following performance deficits affecting function are weakness, impaired endurance, impaired functional mobility, gait instability, impaired balance, decreased coordination, decreased lower extremity function, decreased safety awareness, decreased ROM, impaired cardiopulmonary response to activity.     Rehab Prognosis:  Good; patient would benefit from acute skilled OT services to address these deficits and reach maximum level of function.       Plan:     Patient to be seen 2 x/week to address the above listed problems via self-care/home management, therapeutic activities, therapeutic exercises  Plan of Care Expires: 01/17/25  Plan of Care Reviewed with: patient, daughter    Subjective     Chief Complaint: cough   Patient/Family Comments/goals: increase ADL performance  Pain/Comfort:  Pain Rating 1: 0/10    Objective:     Communicated with: Lelia prior to session.  Patient found HOB elevated with peripheral IV, pulse ox (continuous), oxygen upon OT entry to room.    General Precautions: Standard, fall    Orthopedic Precautions:N/A  Braces: N/A  Respiratory Status: Nasal cannula, flow 6 L/min     Occupational Performance:     Bed Mobility:    Patient completed Rolling/Turning to Right with stand by assistance  Patient completed Scooting/Bridging with stand by assistance  Patient completed Supine to Sit with contact guard assistance     Functional Mobility/Transfers:  Patient completed Sit <> Stand Transfer with minimum  assistance  with  rolling walker   Functional Mobility: Patient completed x10ft functional mobility Min A w/ RW to increase dynamic standing balance and activity tolerance needed for ADL completion.  Pt shows increased confidence in walking ability but still requires v/c for safe movement and hand placement    [facility] 6 Click ADL: 19    Treatment & Education:  Encouraged completion of B UE AROM therex throughout the day to increase functional strength and activity tolerance needed for ADL completion.  Pt completed the following Therex 1x10 in order to increase BM and FM:   Shoulder flexion   Elbow Flexion   Chair presses  OT role, plan of care, progression of goals, importance of continued OOB activity, ADL/functional transfer and mobility retraining, call don't fall, safety precautions, fall prevention. Pt educated on sitting in chair for 1 hour during breakfast lunch and dinner in order to change positions, regulate BP and reduce bed sores.   Pt acknowledges and agrees with POC given by OT.      Patient left up in chair with all lines intact, call button in reach, and chair alarm on    GOALS:   Multidisciplinary Problems       Occupational Therapy Goals          Problem: Occupational Therapy    Goal Priority Disciplines Outcome Interventions   Occupational Therapy Goal     OT, PT/OT Progressing    Description: O.T GOALS MET BY 1-17-25  PT WILL TOLERATE  1 SET X 12 REPS B UE ROM EXERCISE  S WITH UE DRESSING  S WITH LE DRESSING  S WITH TOILET TRANSFERS                           Time Tracking:     OT Date of Treatment: 01/24/25  OT Start Time: 1015  OT Stop Time: 1040  OT Total Time (min): 25 min    Billable Minutes:Therapeutic Activity 15  Therapeutic Exercise 10    OT/CORINA: CORINA     Number of CORINA visits since last OT visit: 2  HOANG Tovar      1/24/2025

## 2025-01-24 NOTE — ASSESSMENT & PLAN NOTE
Creatine stable for now. BMP reviewed- noted Estimated Creatinine Clearance: 31.6 mL/min (based on SCr of 1.2 mg/dL). according to latest data. Based on current GFR, CKD stage is stage 4 - GFR 15-29.  Monitor UOP and serial BMP and adjust therapy as needed. Renally dose meds. Avoid nephrotoxic medications and procedures.  -Creatinine 1.5 on admission however 3 months ago was 1.2    Recent Labs     01/22/25  0449 01/23/25  0551 01/24/25  0514   CREATININE 1.4 1.2 1.2         -Due to GFR, will need to dc ranexa for now  -Holding spironolactone, entresto  -Monitor creatinine

## 2025-01-24 NOTE — PT/OT/SLP PROGRESS
Physical Therapy Treatment    Patient Name:  Shireen Felix   MRN:  [id number]    Recommendations:     Discharge Recommendations: Moderate Intensity Therapy  Discharge Equipment Recommendations: to be determined by next level of care  Barriers to discharge: None    Assessment:     Shireen Felix is a 79 y.o. female admitted with a medical diagnosis of UIP (usual interstitial pneumonitis).  She presents with the following impairments/functional limitations: weakness, impaired endurance, decreased lower extremity function, impaired functional mobility, gait instability, decreased safety awareness, impaired balance, impaired cardiopulmonary response to activity, decreased coordination, decreased ROM.    Rehab Prognosis: Good; patient would benefit from acute skilled PT services to address these deficits and reach maximum level of function.    Recent Surgery: * No surgery found *      Plan:     During this hospitalization, patient to be seen 3 x/week to address the identified rehab impairments via gait training, therapeutic activities, therapeutic exercises and progress toward the following goals:    Plan of Care Expires:  01/31/25    Subjective     Chief Complaint: Pt is motivated to participate  Patient/Family Comments/goals: none stated  Pain/Comfort:  Pain Rating 1: 0/10      Objective:     Communicated with nurse Rowell and epic chart review prior to session.  Patient found HOB elevated with oxygen, telemetry, PureWick (midline, OLIVIA SYS) upon PT entry to room.     General Precautions: Standard, fall, respiratory  Orthopedic Precautions: N/A  Braces: N/A  Respiratory Status:  6L Venti mask     Functional Mobility:  Gait Belt Applied - Yes   Socks/Shoes Donned - Yes  Bed Mobility  Rolling Right: stand by assistance  Scooting: stand by assistance  Supine to Sit: stand by assistance  Transfers  Sit to Stand: minimum assistance with rolling walker  Bed to Chair: minimum assistance with rolling walker using  "Step Transfer  Gait  Patient ambulated 5ft with rolling walker and minimum assistance. Patient demonstrates unsteady gait. No c/o dizziness, mild SOB, educated about pursed lip breathing technique and cued for use with mobility. All lines remained intact throughout ambulation trial, Supplemental O2 6L utilized.  Balance  Sitting: stand by assistance  Standing: minimum assistance    AM-PAC 6 CLICK MOBILITY  Turning over in bed (including adjusting bedclothes, sheets and blankets)?: 3  Sitting down on and standing up from a chair with arms (e.g., wheelchair, bedside commode, etc.): 3  Moving from lying on back to sitting on the side of the bed?: 3  Moving to and from a bed to a chair (including a wheelchair)?: 3  Need to walk in hospital room?: 3  Climbing 3-5 steps with a railing?: 1 (NT)  Basic Mobility Total Score: 16       Treatment & Education:  Reviewed role of PT in acute care and POC. Pt tolerated interventions well. Reviewed importance of OOB activities, activity pacing, and HEP (marching/hip flex, hip abd, heel slides/LAQ, quad sets, ankle pumps) in order to maintain/regain strength. Encouraged to sit up in chair for all meals. Reviewed proper use of RW for safety and to reduce risk of falling. Reviewed "call don't fall" policy and increased risk of falling due to weakness, instructed to utilize call bell for assistance with all transfers. Pt agreeable to all requests.    Patient left up in chair with all lines intact, call button in reach, chair alarm on, and nurse and family present..    GOALS:   Multidisciplinary Problems       Physical Therapy Goals          Problem: Physical Therapy    Goal Priority Disciplines Outcome Interventions   Physical Therapy Goal     PT, PT/OT Progressing    Description: Goals to be met by 1/31/25.  1. Pt will complete bed mobility S.  2. Pt will complete sit to stand MIN A .  3. Pt will ambulate 50Ft WITH MIN A using RW.  4. Pt will increase AMPAC score by 2 points to " progress functional mobility.                       Time Tracking:     PT Received On: 01/24/25  PT Start Time: 1006     PT Stop Time: 1020  PT Total Time (min): 14 min     Billable Minutes: Therapeutic Activity 14min    Treatment Type: Treatment  PT/PTA: PT     Number of PTA visits since last PT visit: 0     01/24/2025

## 2025-01-24 NOTE — ASSESSMENT & PLAN NOTE
Anemia is likely due to chronic disease due to Chronic Kidney Disease. Most recent hemoglobin and hematocrit are listed below.  Recent Labs     01/22/25  0449 01/23/25  0551 01/24/25  0514   HGB 8.8* 9.6* 8.3*   HCT 29.3* 31.9* 27.4*       Plan  - Monitor serial CBC: Daily  - Transfuse PRBC if patient becomes hemodynamically unstable, symptomatic or H/H drops below 7/21.  - Patient has not received any PRBC transfusions to date  - Patient's anemia is currently stable

## 2025-01-24 NOTE — PLAN OF CARE
Problem: Adult Inpatient Plan of Care  Goal: Plan of Care Review  1/24/2025 0600 by Melissa Man RN  Outcome: Progressing  1/24/2025 0600 by Melissa Man RN  Outcome: Progressing  Goal: Patient-Specific Goal (Individualized)  1/24/2025 0600 by Melissa Man RN  Outcome: Progressing  1/24/2025 0600 by Melissa Man RN  Outcome: Progressing  Goal: Absence of Hospital-Acquired Illness or Injury  1/24/2025 0600 by Melissa Man RN  Outcome: Progressing  1/24/2025 0600 by Melissa Man RN  Outcome: Progressing  Goal: Optimal Comfort and Wellbeing  1/24/2025 0600 by Melissa Man RN  Outcome: Progressing  1/24/2025 0600 by Melissa Man RN  Outcome: Progressing  Goal: Readiness for Transition of Care  1/24/2025 0600 by Melissa Man RN  Outcome: Progressing  1/24/2025 0600 by Melissa Man RN  Outcome: Progressing     Problem: Diabetes Comorbidity  Goal: Blood Glucose Level Within Targeted Range  1/24/2025 0600 by Melissa Man RN  Outcome: Progressing  1/24/2025 0600 by Melissa Man RN  Outcome: Progressing     Problem: Fall Injury Risk  Goal: Absence of Fall and Fall-Related Injury  1/24/2025 0600 by Melissa Man RN  Outcome: Progressing  1/24/2025 0600 by Melissa Man RN  Outcome: Progressing     Problem: Wound  Goal: Optimal Coping  1/24/2025 0600 by Melissa Man RN  Outcome: Progressing  1/24/2025 0600 by Melissa Man RN  Outcome: Progressing  Goal: Optimal Functional Ability  1/24/2025 0600 by Melissa Man RN  Outcome: Progressing  1/24/2025 0600 by Melissa Man RN  Outcome: Progressing  Goal: Absence of Infection Signs and Symptoms  1/24/2025 0600 by Melissa Man RN  Outcome: Progressing  1/24/2025 0600 by Melissa Man RN  Outcome: Progressing  Goal: Improved Oral Intake  1/24/2025 0600 by Melissa Man RN  Outcome: Progressing  1/24/2025 0600 by Melissa Man RN  Outcome: Progressing  Goal: Optimal Pain Control and Function  1/24/2025 0600 by Melissa Man,  RN  Outcome: Progressing  1/24/2025 0600 by Melissa Man RN  Outcome: Progressing  Goal: Skin Health and Integrity  1/24/2025 0600 by Melissa Man RN  Outcome: Progressing  1/24/2025 0600 by Melissa Man RN  Outcome: Progressing  Goal: Optimal Wound Healing  1/24/2025 0600 by Melissa Man RN  Outcome: Progressing  1/24/2025 0600 by Melissa Man RN  Outcome: Progressing     Problem: Skin Injury Risk Increased  Goal: Skin Health and Integrity  1/24/2025 0600 by Melissa Man RN  Outcome: Progressing  1/24/2025 0600 by Melissa Man RN  Outcome: Progressing     Problem: Coping Ineffective  Goal: Effective Coping  1/24/2025 0600 by Melissa Man RN  Outcome: Progressing  1/24/2025 0600 by Melissa Man RN  Outcome: Progressing     Problem: Pneumonia  Goal: Fluid Balance  1/24/2025 0600 by Melissa Man RN  Outcome: Progressing  1/24/2025 0600 by Melissa Man RN  Outcome: Progressing  Goal: Resolution of Infection Signs and Symptoms  1/24/2025 0600 by Melissa Man RN  Outcome: Progressing  1/24/2025 0600 by Melissa Man RN  Outcome: Progressing  Goal: Effective Oxygenation and Ventilation  1/24/2025 0600 by Melissa Man RN  Outcome: Progressing  1/24/2025 0600 by Melissa Man RN  Outcome: Progressing     Problem: Infection  Goal: Absence of Infection Signs and Symptoms  1/24/2025 0600 by Melissa Man RN  Outcome: Progressing  1/24/2025 0600 by Melissa Man RN  Outcome: Progressing

## 2025-01-24 NOTE — PLAN OF CARE
Pt tolerated interventions well. Required SBA for bed mobility, ambulated 10ft MIN A using RW. Recommending moderate intensity therapy upon d/c.

## 2025-01-25 LAB
ANION GAP SERPL CALC-SCNC: 10 MMOL/L (ref 8–16)
ANISOCYTOSIS BLD QL SMEAR: SLIGHT
BASOPHILS NFR BLD: 0 % (ref 0–1.9)
BUN SERPL-MCNC: 45 MG/DL (ref 8–23)
CALCIUM SERPL-MCNC: 9.6 MG/DL (ref 8.7–10.5)
CHLORIDE SERPL-SCNC: 106 MMOL/L (ref 95–110)
CO2 SERPL-SCNC: 21 MMOL/L (ref 23–29)
CREAT SERPL-MCNC: 1.2 MG/DL (ref 0.5–1.4)
DIFFERENTIAL METHOD BLD: ABNORMAL
EOSINOPHIL NFR BLD: 6 % (ref 0–8)
ERYTHROCYTE [DISTWIDTH] IN BLOOD BY AUTOMATED COUNT: 15.1 % (ref 11.5–14.5)
EST. GFR  (NO RACE VARIABLE): 46 ML/MIN/1.73 M^2
GLUCOSE SERPL-MCNC: 98 MG/DL (ref 70–110)
HCT VFR BLD AUTO: 29.3 % (ref 37–48.5)
HGB BLD-MCNC: 9 G/DL (ref 12–16)
IMM GRANULOCYTES # BLD AUTO: ABNORMAL K/UL (ref 0–0.04)
IMM GRANULOCYTES NFR BLD AUTO: ABNORMAL % (ref 0–0.5)
LYMPHOCYTES NFR BLD: 26 % (ref 18–48)
MAGNESIUM SERPL-MCNC: 1.8 MG/DL (ref 1.6–2.6)
MCH RBC QN AUTO: 30.9 PG (ref 27–31)
MCHC RBC AUTO-ENTMCNC: 30.7 G/DL (ref 32–36)
MCV RBC AUTO: 101 FL (ref 82–98)
MONOCYTES NFR BLD: 11 % (ref 4–15)
MYELOCYTES NFR BLD MANUAL: 2 %
NEUTROPHILS NFR BLD: 55 % (ref 38–73)
NRBC BLD-RTO: 0 /100 WBC
PHOSPHATE SERPL-MCNC: 2.1 MG/DL (ref 2.7–4.5)
PLATELET # BLD AUTO: 315 K/UL (ref 150–450)
PLATELET BLD QL SMEAR: ABNORMAL
PMV BLD AUTO: 9.5 FL (ref 9.2–12.9)
POIKILOCYTOSIS BLD QL SMEAR: SLIGHT
POLYCHROMASIA BLD QL SMEAR: ABNORMAL
POTASSIUM SERPL-SCNC: 4.1 MMOL/L (ref 3.5–5.1)
RBC # BLD AUTO: 2.91 M/UL (ref 4–5.4)
SODIUM SERPL-SCNC: 137 MMOL/L (ref 136–145)
WBC # BLD AUTO: 6.36 K/UL (ref 3.9–12.7)

## 2025-01-25 PROCEDURE — 84100 ASSAY OF PHOSPHORUS: CPT | Performed by: STUDENT IN AN ORGANIZED HEALTH CARE EDUCATION/TRAINING PROGRAM

## 2025-01-25 PROCEDURE — 83735 ASSAY OF MAGNESIUM: CPT | Performed by: STUDENT IN AN ORGANIZED HEALTH CARE EDUCATION/TRAINING PROGRAM

## 2025-01-25 PROCEDURE — 25000003 PHARM REV CODE 250: Performed by: STUDENT IN AN ORGANIZED HEALTH CARE EDUCATION/TRAINING PROGRAM

## 2025-01-25 PROCEDURE — 94761 N-INVAS EAR/PLS OXIMETRY MLT: CPT

## 2025-01-25 PROCEDURE — 85027 COMPLETE CBC AUTOMATED: CPT | Performed by: STUDENT IN AN ORGANIZED HEALTH CARE EDUCATION/TRAINING PROGRAM

## 2025-01-25 PROCEDURE — 25000242 PHARM REV CODE 250 ALT 637 W/ HCPCS: Performed by: STUDENT IN AN ORGANIZED HEALTH CARE EDUCATION/TRAINING PROGRAM

## 2025-01-25 PROCEDURE — 25000242 PHARM REV CODE 250 ALT 637 W/ HCPCS: Performed by: INTERNAL MEDICINE

## 2025-01-25 PROCEDURE — 97530 THERAPEUTIC ACTIVITIES: CPT

## 2025-01-25 PROCEDURE — 63600175 PHARM REV CODE 636 W HCPCS: Performed by: INTERNAL MEDICINE

## 2025-01-25 PROCEDURE — 80048 BASIC METABOLIC PNL TOTAL CA: CPT | Performed by: STUDENT IN AN ORGANIZED HEALTH CARE EDUCATION/TRAINING PROGRAM

## 2025-01-25 PROCEDURE — 21400001 HC TELEMETRY ROOM

## 2025-01-25 PROCEDURE — 94640 AIRWAY INHALATION TREATMENT: CPT

## 2025-01-25 PROCEDURE — 97116 GAIT TRAINING THERAPY: CPT | Mod: CQ

## 2025-01-25 PROCEDURE — 25000003 PHARM REV CODE 250: Performed by: INTERNAL MEDICINE

## 2025-01-25 PROCEDURE — 36415 COLL VENOUS BLD VENIPUNCTURE: CPT | Performed by: STUDENT IN AN ORGANIZED HEALTH CARE EDUCATION/TRAINING PROGRAM

## 2025-01-25 PROCEDURE — 97530 THERAPEUTIC ACTIVITIES: CPT | Mod: CQ

## 2025-01-25 PROCEDURE — 97110 THERAPEUTIC EXERCISES: CPT

## 2025-01-25 PROCEDURE — 85007 BL SMEAR W/DIFF WBC COUNT: CPT | Performed by: STUDENT IN AN ORGANIZED HEALTH CARE EDUCATION/TRAINING PROGRAM

## 2025-01-25 PROCEDURE — 25000003 PHARM REV CODE 250: Performed by: NURSE PRACTITIONER

## 2025-01-25 PROCEDURE — 63600175 PHARM REV CODE 636 W HCPCS: Performed by: NURSE PRACTITIONER

## 2025-01-25 RX ORDER — CALCIUM CARBONATE 200(500)MG
500 TABLET,CHEWABLE ORAL 3 TIMES DAILY PRN
Status: DISCONTINUED | OUTPATIENT
Start: 2025-01-25 | End: 2025-01-28 | Stop reason: HOSPADM

## 2025-01-25 RX ADMIN — LEVETIRACETAM 1500 MG: 500 TABLET, FILM COATED ORAL at 08:01

## 2025-01-25 RX ADMIN — IPRATROPIUM BROMIDE AND ALBUTEROL SULFATE 3 ML: 2.5; .5 SOLUTION RESPIRATORY (INHALATION) at 12:01

## 2025-01-25 RX ADMIN — PANTOPRAZOLE SODIUM 40 MG: 40 TABLET, DELAYED RELEASE ORAL at 08:01

## 2025-01-25 RX ADMIN — SALINE NASAL SPRAY 1 SPRAY: 1.5 SOLUTION NASAL at 08:01

## 2025-01-25 RX ADMIN — BENZONATATE 200 MG: 100 CAPSULE ORAL at 08:01

## 2025-01-25 RX ADMIN — IPRATROPIUM BROMIDE AND ALBUTEROL SULFATE 3 ML: 2.5; .5 SOLUTION RESPIRATORY (INHALATION) at 01:01

## 2025-01-25 RX ADMIN — GUAIFENESIN AND DEXTROMETHORPHAN HYDROBROMIDE 2 TABLET: 600; 30 TABLET, EXTENDED RELEASE ORAL at 08:01

## 2025-01-25 RX ADMIN — THERA TABS 1 TABLET: TAB at 08:01

## 2025-01-25 RX ADMIN — PREDNISONE 20 MG: 20 TABLET ORAL at 09:01

## 2025-01-25 RX ADMIN — SALINE NASAL SPRAY 1 SPRAY: 1.5 SOLUTION NASAL at 03:01

## 2025-01-25 RX ADMIN — ATORVASTATIN CALCIUM 40 MG: 40 TABLET, FILM COATED ORAL at 08:01

## 2025-01-25 RX ADMIN — FUROSEMIDE 20 MG: 20 TABLET ORAL at 08:01

## 2025-01-25 RX ADMIN — BUDESONIDE INHALATION 0.5 MG: 0.5 SUSPENSION RESPIRATORY (INHALATION) at 07:01

## 2025-01-25 RX ADMIN — IPRATROPIUM BROMIDE AND ALBUTEROL SULFATE 3 ML: 2.5; .5 SOLUTION RESPIRATORY (INHALATION) at 07:01

## 2025-01-25 RX ADMIN — GABAPENTIN 300 MG: 300 CAPSULE ORAL at 08:01

## 2025-01-25 RX ADMIN — ARFORMOTEROL TARTRATE 15 MCG: 15 SOLUTION RESPIRATORY (INHALATION) at 07:01

## 2025-01-25 RX ADMIN — BENZONATATE 200 MG: 100 CAPSULE ORAL at 10:01

## 2025-01-25 RX ADMIN — ESCITALOPRAM OXALATE 20 MG: 10 TABLET, FILM COATED ORAL at 08:01

## 2025-01-25 RX ADMIN — OXYCODONE HYDROCHLORIDE AND ACETAMINOPHEN 500 MG: 500 TABLET ORAL at 08:01

## 2025-01-25 RX ADMIN — BENZONATATE 200 MG: 100 CAPSULE ORAL at 04:01

## 2025-01-25 RX ADMIN — MORPHINE SULFATE 2 MG: 2 INJECTION, SOLUTION INTRAMUSCULAR; INTRAVENOUS at 08:01

## 2025-01-25 RX ADMIN — CALCIUM CARBONATE (ANTACID) CHEW TAB 500 MG 500 MG: 500 CHEW TAB at 11:01

## 2025-01-25 RX ADMIN — MONTELUKAST 10 MG: 10 TABLET, FILM COATED ORAL at 08:01

## 2025-01-25 RX ADMIN — ALPRAZOLAM 0.25 MG: 0.25 TABLET ORAL at 07:01

## 2025-01-25 NOTE — SUBJECTIVE & OBJECTIVE
Interval History: f/u resp failure now on room air. Encourage OOB and movement    Review of Systems  Objective:     Vital Signs (Most Recent):  Temp: 98.4 °F (36.9 °C) (01/25/25 1126)  Pulse: (!) 114 (01/25/25 1525)  Resp: 16 (01/25/25 1320)  BP: (!) 177/81 (01/25/25 1126)  SpO2: 99 % (01/25/25 1320) Vital Signs (24h Range):  Temp:  [97.8 °F (36.6 °C)-98.4 °F (36.9 °C)] 98.4 °F (36.9 °C)  Pulse:  [] 114  Resp:  [16-20] 16  SpO2:  [94 %-100 %] 99 %  BP: (129-177)/(65-81) 177/81     Weight: 56.5 kg (124 lb 9 oz)  Body mass index is 21.38 kg/m².  No intake or output data in the 24 hours ending 01/25/25 1530      Physical Exam  HENT:      Head: Normocephalic and atraumatic.   Cardiovascular:      Rate and Rhythm: Normal rate and regular rhythm.      Heart sounds: No murmur heard.  Pulmonary:      Effort: Pulmonary effort is normal. No respiratory distress.      Breath sounds: Decreased breath sounds present. No wheezing.   Abdominal:      General: Bowel sounds are normal. There is no distension.      Palpations: Abdomen is soft.      Tenderness: There is no abdominal tenderness.   Musculoskeletal:         General: No swelling.   Skin:     General: Skin is warm and dry.   Neurological:      Mental Status: She is alert and oriented to person, place, and time. Mental status is at baseline.             Significant Labs: All pertinent labs within the past 24 hours have been reviewed.  Recent Lab Results         01/25/25  0501        Anion Gap 10       Aniso Slight       Basophil % 0.0       BUN 45       Calcium 9.6       Chloride 106       CO2 21       Creatinine 1.2       Differential Method Manual       eGFR 46       Eos % 6.0       Glucose 98       Gran % 55.0       Hematocrit 29.3       Hemoglobin 9.0       Immature Grans (Abs) CANCELED  Comment: Mild elevation in immature granulocytes is non specific and   can be seen in a variety of conditions including stress response,   acute inflammation, trauma and pregnancy.  Correlation with other   laboratory and clinical findings is essential.    Result canceled by the ancillary.         Immature Granulocytes CANCELED  Comment: Result canceled by the ancillary.       Lymph % 26.0       Magnesium  1.8       MCH 30.9       MCHC 30.7              Mono % 11.0       MPV 9.5       Myelocytes 2.0       nRBC 0       Phosphorus Level 2.1       Platelet Estimate Appears normal       Platelet Count 315       Poikilocytosis Slight       Poly Occasional       Potassium 4.1       RBC 2.91       RDW 15.1       Sodium 137       WBC 6.36               Significant Imaging: I have reviewed all pertinent imaging results/findings within the past 24 hours.

## 2025-01-25 NOTE — PLAN OF CARE
Pt currently requires CGA for bed mobilty and transfers. She is  ambulating with a RW on room air `45 feet with CGA.

## 2025-01-25 NOTE — ASSESSMENT & PLAN NOTE
Creatine stable for now. BMP reviewed- noted Estimated Creatinine Clearance: 32.3 mL/min (based on SCr of 1.2 mg/dL). according to latest data. Based on current GFR, CKD stage is stage 4 - GFR 15-29.  Monitor UOP and serial BMP and adjust therapy as needed. Renally dose meds. Avoid nephrotoxic medications and procedures.  -Creatinine 1.5 on admission however 3 months ago was 1.2    Recent Labs     01/23/25  0551 01/24/25  0514 01/25/25  0501   CREATININE 1.2 1.2 1.2

## 2025-01-25 NOTE — PLAN OF CARE
A223/A223 OLU Felix is a 79 y.o.female admitted on 12/31/2024 for UIP (usual interstitial pneumonitis)   Code Status: DNR MRN: [id number]   Review of patient's allergies indicates:   Allergen Reactions    Penicillins Anaphylaxis, Hives, Shortness Of Breath and Swelling    Penicillin     Adhesive Rash    Doxycycline Nausea Only    Oxycodone Other (See Comments)     Fatigue      Sulfa (sulfonamide antibiotics) Itching     Past Medical History:   Diagnosis Date    Abnormal ECG 08/05/2019    Acute bronchitis and bronchiolitis 12/31/2024    Aneurysm     Anticoagulant long-term use     Arthritis     CAD in native artery 08/05/2019    Diabetes mellitus     Fall 10/10/2019    Formatting of this note might be different from the original. Found sitting on floor next to bed last night Mild confusion today Does not recall falling or how she ended up on floor UA today Labs yesterday unremarkable    Glaucoma     Hypertension     Pulmonary fibrosis     Seizures     Shingles 05/27/2017    Stroke       PRN meds    acetaminophen, 650 mg, Q6H PRN  ALPRAZolam, 0.25 mg, TID PRN  dextrose 50%, 12.5 g, PRN  dextrose 50%, 25 g, PRN  glucagon (human recombinant), 1 mg, PRN  glucose, 16 g, PRN  glucose, 24 g, PRN  ipratropium, 0.5 mg, Q4H PRN  magic mouthwash (diphenhydrAMINE 12.5 mg/5 mL 20 mL, aluminum & magnesium hydroxide-simethicone (MYLANTA) 20 mL, LIDOcaine HCl 2% (XYLOCAINE) 20 mL) solution, 10 mL, Q4H PRN  melatonin, 6 mg, Nightly PRN  methocarbamoL, 500 mg, TID PRN  morphine, 2 mg, Q4H PRN  ondansetron, 4 mg, Q8H PRN  promethazine-codeine 6.25-10 mg/5 ml, 5 mL, Q4H PRN  sodium chloride 0.9%, 10 mL, PRN      Chart check completed. Will continue plan of care.   No acute changes today in pts care. Pt still on room air not requiring supplemental O2. Still awaiting placement SNF.     Orientation: oriented x 4  Anton Coma Scale Score: 15     Lead Monitored: Lead II Rhythm: sinus tachycardia Frequency/Ectopy: PACs,  PVCs  Cardiac/Telemetry Box Number: [id number]  VTE Core Measure: (SCDs) Sequential compression device initiated/maintained Last Bowel Movement: 01/23/25  Diet Cardiac Low Potassium; Isolation Tray - Styrofoam  Voiding Characteristics: urgency  Gilberto Score: 17  Fall Risk Score: 21  Accucheck []   Freq?      Lines/Drains/Airways       Peripheral Intravenous Line  Duration                  Midline Catheter - Single Lumen 01/17/25 1925 Left basilic vein (medial side of arm) other (see comments) 7 days

## 2025-01-25 NOTE — ASSESSMENT & PLAN NOTE
Pulm fibrosis hx- followed by pulmonology outpatient  Likely had flare of pulmonary fibrosis secondary to the COVID infection  Weaned to room air  Continue steroids, slow taper

## 2025-01-25 NOTE — PT/OT/SLP PROGRESS
"Physical Therapy  Treatment    Shireen Felix   MRN: [id number]   Admitting Diagnosis: UIP (usual interstitial pneumonitis)    PT Received On: 01/25/25  PT Start Time: 0957     PT Stop Time: 1025    PT Total Time (min): 28 min       Billable Minutes:  Gait Training 15 and Therapeutic Activity 13       PT/PTA: PTA     Number of PTA visits since last PT visit: 1       General Precautions: Standard, fall, respiratory  Orthopedic Precautions: N/A  Braces: N/A  Respiratory Status: Room air    Spiritual, Cultural Beliefs, Religion Practices, Values that Affect Care: no    Subjective:  Communicated with [facility]   prior to session.  "I already walked and did my arm and leg exercises. I don't need to do it again. He told me to sit up for three hours, Its time to go back to bed"     Pain/Comfort  Pain Rating 1: 0/10    Objective:   Patient found with: telemetry (PT IS NOW ON ROOM AIR)    Functional Mobility:  Bed Mobility:     Sit to supine: cga   Rolling: CGA   Supine scooting in bed: cga    Transfers:    Sit to stand: cga   Stand to sit:CGA   Chair <->bsc: CGA    Gait:     2 laps in her room chair to window to door .(` 45 feet ) Pt is unwilling to go in the shore due to fear of getting sick from other patients. Pt ambulated with        the    RW with cga. Attempted to monitor her sats but unable to get a reading.      Treatment and Education:  Pt presents in the chair. Initially decling therapy due to already having walked  etc. ( She may have had OT  earlier) . Pt did ambulated around the room but continued to decline LE exercise due to having already done them. She tranfsered on and off the bsc with cga. She required assistance for self care . Pt did manage her own brief . She sat in the chair briefly due to a coughing spell then asked to return to bed.     Pt was educated on the benefit of mobility, ambulation and strengthening exercises.      AM-PAC 6 CLICK MOBILITY  How much help from another person does this patient " currently need?   1 = Unable, Total/Dependent Assistance  2 = A lot, Maximum/Moderate Assistance  3 = A little, Minimum/Contact Guard/Supervision  4 = None, Modified [place]/Independent         AM-PAC Raw Score CMS G-Code Modifier Level of Impairment Assistance   6 % Total / Unable   7 - 9 CM 80 - 100% Maximal Assist   10 - 14 CL 60 - 80% Moderate Assist   15 - 19 CK 40 - 60% Moderate Assist   20 - 22 CJ 20 - 40% Minimal Assist   23 CI 1-20% SBA / CGA   24 CH 0% Independent/ Mod I     Patient left HOB elevated with all lines intact, call button in reach, bed alarm on, and staff  notified.    Assessment:  Shireen Felix is a 79 y.o. female with a medical diagnosis of UIP (usual interstitial pneumonitis). She is now off oxygen and on RA. She states that her sats remain good and she can not qualifly for home oxygen . She is anxious about this because  at times she feels very sob. Overall, She is  progressing fairly well.     Rehab identified problem list/impairments: weakness, impaired balance, decreased safety awareness, impaired skin, impaired endurance, impaired cardiopulmonary response to activity, impaired self care skills, impaired functional mobility, gait instability, decreased lower extremity function, decreased upper extremity function, decreased coordination    Rehab potential is fair.    Activity tolerance: Fair    Discharge recommendations: Moderate Intensity Therapy      Barriers to discharge:      Equipment recommendations: other (see comments) (to be determined at the next level of care)     GOALS:   Multidisciplinary Problems       Physical Therapy Goals          Problem: Physical Therapy    Goal Priority Disciplines Outcome Interventions   Physical Therapy Goal     PT, PT/OT Progressing    Description: Goals to be met by 1/31/25.  1. Pt will complete bed mobility S.  2. Pt will complete sit to stand MIN A .  3. Pt will ambulate 50Ft WITH MIN A using RW.  4. Pt will increase AMPAC  score by 2 points to progress functional mobility.                           PLAN:    Patient to be seen 3 x/week to address the above listed problems via gait training, therapeutic activities, therapeutic exercises  Plan of Care expires: 01/31/25  Plan of Care reviewed with: patient         01/25/2025

## 2025-01-25 NOTE — PT/OT/SLP PROGRESS
Occupational Therapy   Treatment    Name: Shireen Felix  MRN: [id number]  Admitting Diagnosis:  UIP (usual interstitial pneumonitis)       Recommendations:     Discharge Recommendations: Moderate Intensity Therapy  Discharge Equipment Recommendations:  to be determined by next level of care  Barriers to discharge:  Decreased caregiver support    Assessment:     Shireen Felix is a 79 y.o. female with a medical diagnosis of UIP (usual interstitial pneumonitis).  She presents with the following performance deficits affecting function are weakness, impaired endurance, impaired functional mobility, gait instability, impaired balance, decreased coordination, decreased lower extremity function, decreased safety awareness, decreased ROM, impaired cardiopulmonary response to activity.     Rehab Prognosis:  Good; patient would benefit from acute skilled OT services to address these deficits and reach maximum level of function.       Plan:     Patient to be seen 2 x/week to address the above listed problems via self-care/home management, therapeutic activities, therapeutic exercises  Plan of Care Expires: 01/17/25  Plan of Care Reviewed with: patient    Subjective     Chief Complaint: congested  Patient/Family Comments/goals: Go home  Pain/Comfort:  Pain Rating 1: 0/10    Objective:     Communicated with: Lelia prior to session.  Patient found HOB elevated with telemetry upon OT entry to room.    General Precautions: Standard, respiratory, fall    Orthopedic Precautions:N/A  Braces: N/A  Respiratory Status: Room air     Occupational Performance:     Bed Mobility:    Patient completed Rolling/Turning to Right with stand by assistance  Patient completed Scooting/Bridging with stand by assistance  Patient completed Supine to Sit with stand by assistance   Pt completed EOB static sitting for 2 min SBA with no deviation in position    Functional Mobility/Transfers:  Patient completed Sit <> Stand Transfer with contact  guard assistance  with  rolling walker   Functional Mobility: Patient completed x25ft functional mobility CGA w/ RW to increase dynamic standing balance and activity tolerance needed for ADL completion.  Pt required v/c for hand placement. Shows great improvement in gait. NO SHUFFLING this date        [facility] 6 Click ADL: 19    Treatment & Education:  Encouraged completion of B UE AROM therex throughout the day to increase functional strength and activity tolerance needed for ADL completion.  Pt completed the following Therex 1x10 in order to increase BM and FM:   Shoulder flexion   Elbow Flexion   Chair presses  OT role, plan of care, progression of goals, importance of continued OOB activity, ADL/functional transfer and mobility retraining, call don't fall, safety precautions, fall prevention. Pt educated on sitting in chair for 1 hour during breakfast lunch and dinner in order to change positions, regulate BP and reduce bed sores.   Pt acknowledges and agrees with POC given by OT.        Patient left up in chair with all lines intact, call button in reach, and chair alarm on    GOALS:   Multidisciplinary Problems       Occupational Therapy Goals          Problem: Occupational Therapy    Goal Priority Disciplines Outcome Interventions   Occupational Therapy Goal     OT, PT/OT Progressing    Description: O.T GOALS MET BY 1-17-25  PT WILL TOLERATE  1 SET X 12 REPS B UE ROM EXERCISE  S WITH UE DRESSING  S WITH LE DRESSING  S WITH TOILET TRANSFERS                           Time Tracking:     OT Date of Treatment: 01/25/25  OT Start Time: 0830  OT Stop Time: 0900  OT Total Time (min): 30 min    Billable Minutes:Therapeutic Activity 20  Therapeutic Exercise 10    OT/CORINA: CORINA     Number of CORINA visits since last OT visit: 3  HOANG Tovar    1/25/2025

## 2025-01-25 NOTE — PROGRESS NOTES
[facility] Medicine  Progress Note    Patient Name: Shireen Felix  MRN: [id number]  Patient Class: IP- Inpatient   Admission Date: 12/31/2024  Length of Stay: 24 days  Attending Physician: Yoselin Rodriguez MD  Primary Care Provider: Laron Chaudhari MD        Subjective     Principal Problem:UIP (usual interstitial pneumonitis)        HPI:  Shireen Felix is a 79 year old female who has  has a past medical history of Abnormal ECG, Acute bronchitis and bronchiolitis, Aneurysm, Anticoagulant long-term use, Arthritis, CAD in native artery, COPD (chronic obstructive pulmonary disease), Diabetes mellitus, Fall, Glaucoma, Hypertension, Seizures, Shingles, and Stroke who presented to Emergency Department for evaluation for cough. Associated symptoms include: congestion, wheezing, and SOB. She was seen by her pulmonologist today, Dr. Sam. Started as sinus pain and drainage and now has progressed to respiratory symptoms. She was seen a few days ago in the ER given prescription for antibiotics steroids but these have not helped much. Today in the office she can barely talk without coughing continually. She is having a difficult time putting more than 3 words together without severe cough paroxysms. ED workup showed Hgb/Hct 11.9/37.0, K+ 3.1, CO2 16, BUN/creatinine 34/1.5. CT chest w/out contrast stable suspected pulmonary fibrosis of the lungs. No acute infiltrates. She has received DuoNeb x 1, solumedrol 125 mg IV x 1, KCL 40 mEq PO x 1 and NS 1L bolus. Pt admitted for COVID.     Overview/Hospital Course:  Admitted to Hospital Medicine for acute on chronic respiratory failure concerns in setting of COVID-19.  Started on IV methylprednisolone, remdesivir, scheduled respiratory treatments. 01/04: Reported left lower quadrant abdominal pain with tenderness to palpation, will evaluate via CT as patient is on therapeutic anticoagulation for COVID-19.  CT abdomen/pelvis nonacute.  01/05:  Reported improvements in respiratory status with decreased frequency of cough and dyspnea but still reporting significant symptoms from baseline.  On 1/6, patient had a significant event with acute tachypnea, tachycardia. Pulmonology consulted, orders adjusted. Patient stable overnight, had another event on 1/7 similar to previous day. Possible exacerbation related to anxiety provoked by medications and congestion. Case reviewed, orders adjusted. Per Pulmonology, due to underlying lung fibrosis, patient is likely not benefiting from bronchodilators. Stopped on 1/7, patient did not experience any further episodes of anxiety, respiratory distress. Will continue xanax and prn morphine. Patient with epistaxis, small clots in tissue noted. Reduced supplemental O2 to 1L at rest, stopped full dose lovenox and placed on ppx dose. Hgb stable.   Difficulty with discharge plan, patient originally planned to d/c to SNF, now prefers to d/c home.  Pulmonology discussed home palliative care vs hospice.  Patient and family wish to discuss the Services further.  Per discussion with patient, family will be available for an informational visit on Monday.  Educated patient on results of oxygen evaluation, advised patient to reduce oxygen to 2 L or less at rest, requires 4 L with exertion.  As clinically indicated if significant improvement noted will repeat oxygen evaluation.     01/12/2025  O2 sats stable on 2L (while at rest). Patient and family wants to try and go home, but unable to participate with PT over weekend. Repeat desat study on Monday. May have to consider palliative measures if progress plateaus.      01/13/2025  Feeling slightly better today than she felt a few days ago but overall still significantly short of breath with exertion.  Discussed with her, palliative Care, and her family.  She does not want to change to hospice at this time.  She wants to try to get better.  She does want to be DNR though.   Appreciate further palliative care involvement.  Still desaturating significantly with exertion and unable to work with PT because of this    01/14/2025  Feeling perhaps slightly better today, attempting to work with physical therapy.  We are hoping that we could either get her to rehab if her oxygen requirements with exertion go down, or potentially get a bigger concentrator at home for increasing her up to 10 L with exertion at home.  Otherwise no changes to the plan    01/15/2025:  Still having coughing spells but notably improved.  Oxygen is being down titrated and she even got a room air today.  We are screening her for rehabs    01/16/2025:  Coughing she states is much worse when oxygen is off.  She was on room air but I put her on 1 L to see if there is truly an effect.  She is still being screened for rehabs.  Overall she looks much better.  She did technically have a fever today to 101.4 but when I evaluate her she looks notably better, CXR is negative for acute change, would not recommend starting antibiotics and she has defervesced    1/17/25: Somnolent this AM, spiking more fevers yesterday evening, and mildly soft BP this AM. Lactate wnl. Alert and oriented on my eval. CXR with ? Infiltrate, more cough, feeling worse. Will start ABX for PNA, blood cultures, sputum culture    01/18/2025: Significant epistaxis and hemoptysis overnight.  CT with and without contrast did not reveal a source of bleed.  Still coughing with hemoptysis this morning.  On exam with obvious epistaxis, packed by ENT, resolved.  Coughing has improved.  Currently on Ventimask to avoid nasal cannula     01/19/2025: Doing better today, no more hemoptysis, less cough, no more epistaxis, hemoglobin up slightly.  Still on Ventimask to avoid nasal cannula but only requiring minimal quantities of oxygen, can take it off to eat.  Listed precautions yesterday evening for COVID given has been more than 10 days since diagnosis    1/20/25:  reporting abdominal pain when coughing. States she reported yesterday as well.     1/21/25 patient reports she is feeling better. Abdominal pain with movement. Audible wheezing noted. Duonebs added    1/22/25: adjusted medications for cough. Awaiting placement    1/23/25: patient with persistent cough. Chest x-ray stable. Changed Mucinex to Mucinex DM. Continue steroids, slow taper    1/24/25: patient asking about home oxygen. Currently using venti mask for comfort. Home oxygen eval ordered again. Awaiting placement.    1/25/25: patient no longer requires oxygen. Cough is improving. Awaiting placment    Interval History: f/u resp failure now on room air. Encourage OOB and movement    Review of Systems  Objective:     Vital Signs (Most Recent):  Temp: 98.4 °F (36.9 °C) (01/25/25 1126)  Pulse: (!) 114 (01/25/25 1525)  Resp: 16 (01/25/25 1320)  BP: (!) 177/81 (01/25/25 1126)  SpO2: 99 % (01/25/25 1320) Vital Signs (24h Range):  Temp:  [97.8 °F (36.6 °C)-98.4 °F (36.9 °C)] 98.4 °F (36.9 °C)  Pulse:  [] 114  Resp:  [16-20] 16  SpO2:  [94 %-100 %] 99 %  BP: (129-177)/(65-81) 177/81     Weight: 56.5 kg (124 lb 9 oz)  Body mass index is 21.38 kg/m².  No intake or output data in the 24 hours ending 01/25/25 1530      Physical Exam  HENT:      Head: Normocephalic and atraumatic.   Cardiovascular:      Rate and Rhythm: Normal rate and regular rhythm.      Heart sounds: No murmur heard.  Pulmonary:      Effort: Pulmonary effort is normal. No respiratory distress.      Breath sounds: Decreased breath sounds present. No wheezing.   Abdominal:      General: Bowel sounds are normal. There is no distension.      Palpations: Abdomen is soft.      Tenderness: There is no abdominal tenderness.   Musculoskeletal:         General: No swelling.   Skin:     General: Skin is warm and dry.   Neurological:      Mental Status: She is alert and oriented to person, place, and time. Mental status is at baseline.             Significant  Labs: All pertinent labs within the past 24 hours have been reviewed.  Recent Lab Results         01/25/25  0501        Anion Gap 10       Aniso Slight       Basophil % 0.0       BUN 45       Calcium 9.6       Chloride 106       CO2 21       Creatinine 1.2       Differential Method Manual       eGFR 46       Eos % 6.0       Glucose 98       Gran % 55.0       Hematocrit 29.3       Hemoglobin 9.0       Immature Grans (Abs) CANCELED  Comment: Mild elevation in immature granulocytes is non specific and   can be seen in a variety of conditions including stress response,   acute inflammation, trauma and pregnancy. Correlation with other   laboratory and clinical findings is essential.    Result canceled by the ancillary.         Immature Granulocytes CANCELED  Comment: Result canceled by the ancillary.       Lymph % 26.0       Magnesium  1.8       MCH 30.9       MCHC 30.7              Mono % 11.0       MPV 9.5       Myelocytes 2.0       nRBC 0       Phosphorus Level 2.1       Platelet Estimate Appears normal       Platelet Count 315       Poikilocytosis Slight       Poly Occasional       Potassium 4.1       RBC 2.91       RDW 15.1       Sodium 137       WBC 6.36               Significant Imaging: I have reviewed all pertinent imaging results/findings within the past 24 hours.    Assessment and Plan     * UIP (usual interstitial pneumonitis)  Pulm fibrosis hx- followed by pulmonology outpatient  Likely had flare of pulmonary fibrosis secondary to the COVID infection  Weaned to room air  Continue steroids, slow taper      Hyperkalemia  Hyperkalemia is likely due to  metabolic acidosis .The patients most recent potassium results are listed below.  Recent Labs     01/23/25  0551 01/24/25  0514 01/25/25  0501   K 4.0 4.2 4.1       Plan  - Monitor for arrhythmias with EKG and/or continuous telemetry.   - - Monitor potassium: Daily  - The patient's hyperkalemia is resolved            Atrial fibrillation  Patient with  paroxysmal (<7 days) atrial fibrillation which is {:controlled currently without medication. Patient is currently in sinus rhythm.[id number] Score: 5. HASBLED Score: . Anticoagulation Patient is not anticoagulated due to bleeding risk .           Nasal septal deviation        Hemoptysis  Likely secondary to the epistaxis.  CT with and without contrast negative.  On TXA nebs but if no further hemoptysis may be able to stop soon.  Likely exacerbated by the coughing so adding cough syrup with codeine today in addition to her other antitussives. Improved significantly after addressing the epistaxis 01/18/2025      Hospital acquired PNA  -completed course of cefepime    Encounter for palliative care  Palliative Care is following.  Receiving hydrocodone/acetaminophen p.r.n., morphine p.r.n., alprazolam p.r.n. for respiratory distress with good effect      Epistaxis  Likely secondary to supplemental O2, septal defect.  ENT packed the nose 1/18/25 and it seems to have resolved.  I think this is the cause of hemoptysis.  Pulm saw the patient as well and she is on tranexamic acid nebs although we do not feel the bleeding is coming from the lungs.  CT with and without contrast did not reveal a source of bleeding.  Hemoglobin has been dropping over the past few days but is not increasing after addressing the epistaxis.    -Mucomyst nasal spray to keep the septum moist    Acute on chronic respiratory failure with hypoxia  Patient with Hypoxic Respiratory failure which is Acute on chronic.  she is not on home oxygen. Supplemental oxygen was provided and noted- Oxygen Concentration (%):  [28] 28    .   Signs/symptoms of respiratory failure include- tachypnea, increased work of breathing, respiratory distress, use of accessory muscles, wheezing, stridor, and lethargy. Contributing diagnoses includes - Interstitial lung disease Labs and images were reviewed. Patient Has recent ABG, which has been reviewed. Will treat underlying  "causes and adjust management of respiratory failure as follows-   -will need new home oxygen evaluation  -continue steroids, slow taper                           CKD (chronic kidney disease)  Creatine stable for now. BMP reviewed- noted Estimated Creatinine Clearance: 32.3 mL/min (based on SCr of 1.2 mg/dL). according to latest data. Based on current GFR, CKD stage is stage 4 - GFR 15-29.  Monitor UOP and serial BMP and adjust therapy as needed. Renally dose meds. Avoid nephrotoxic medications and procedures.  -Creatinine 1.5 on admission however 3 months ago was 1.2    Recent Labs     01/23/25  0551 01/24/25  0514 01/25/25  0501   CREATININE 1.2 1.2 1.2             Chronic systolic heart failure  Patient has Diastolic (HFpEF) heart failure that is Chronic. On presentation their CHF was well compensated. Most recent BNP and echo results are listed below.  No results for input(s): "BNP" in the last 72 hours.    Latest ECHO  Results for orders placed during the hospital encounter of 06/02/23    Echo    Interpretation Summary  · The left ventricle is normal in size with concentric hypertrophy and low normal systolic function.  · The estimated ejection fraction is 50%.  · Normal left ventricular diastolic function.  · There is abnormal septal wall motion consistent with left bundle branch block.  · Normal right ventricular size with normal right ventricular systolic function.  · Normal central venous pressure (3 mmHg).  · The estimated PA systolic pressure is 30 mmHg.  · Mild mitral regurgitation.    Current Heart Failure Medications  furosemide tablet 20 mg, Daily, Oral    Plan  - Monitor strict I&Os and daily weights.    - Place on telemetry  - Low sodium diet  - Place on fluid restriction of 1.5 L.   - Cardiology has not been consulted  - The patient's volume status is at their baseline  -         COVID-19  ** initially COVID positive on 12/31 on admission **   Patient is identified as Severe COVID-19 based on " hypoxemia with O2 saturations <94% on room air or on ambulation   Initiated standard COVID protocols; COVID-19 testing ,Infection Control notification  and isolation- respiratory, contact and droplet per protocol    Management: Treated with targetedtherapy with Remdesivir, 200mg IV x1, followed by 100mg IV daily x5 days total and Anticoagulation, Patient admitted to non-critical care unit- Will initiate full dose anticoagulation with Weight based lovenox 1mg/kg IV q12, stopped on 1/11/25, Maintain oxygen saturations 92-96% via Nasal Cannula  and monitor with continuous/intermittent pulse oximetry. , and Continuous cardiac monitoring.    As of 01/18/2025 DC'd  precautions as it has been more than 10 days since onset, and her persistent symptoms are likely a flare of her interstitial lung disease/postviral pneumonia     Mixed hyperlipidemia  Lab Results   Component Value Date    CHOL 112 (L) 07/12/2024    HDL 41 07/12/2024    LDLCALC 49.8 (L) 07/12/2024    TRIG 106 07/12/2024     -Continue Statin    Fall  Fall night of 1/15 with head strike   Evaluated by nighttime hospitalist   Head CT was negative   Neuro exam on 01/16 is normal   Fall precautions  Continue therapy      Normocytic anemia  Anemia is likely due to chronic disease due to Chronic Kidney Disease. Most recent hemoglobin and hematocrit are listed below.  Recent Labs     01/23/25  0551 01/24/25  0514 01/25/25  0501   HGB 9.6* 8.3* 9.0*   HCT 31.9* 27.4* 29.3*       Plan  - Monitor serial CBC: Daily  - Transfuse PRBC if patient becomes hemodynamically unstable, symptomatic or H/H drops below 7/21.  - Patient has not received any PRBC transfusions to date  - Patient's anemia is currently stable      Seizure  Hx of seizures, patient has not had a seizure during this admission.   At home is on seizure medication Aptiom 400mg daily and Keppra 1,000mg BID  Aptiom likely worsening hyponatremia and also inhibiting SNF placement due to cost, so discussed with neuro  on call  Per neuro on call, would DC Aptiom and increase Keppra to 1,500mg BID, change made  Can follow up with neuro as outpatient to revisit        Coronary artery disease of native artery of native heart with stable angina pectoris  Patient with known CAD  No anginal signs or symptoms   On Plavix and statin as an outpatient   Holding Plavix currently for the epistaxis, will resume in couple of days     COPD exacerbation  Scheduled Duonebs  Continue oral and inhaled steroids  LABA    GERD (gastroesophageal reflux disease)  -Continue PPI      Nasal septal perforation  Was likely contributing to the epistaxis   Seen by ENT   Packing in place   No further bleeding   Mucomyst nasal spray to the septal defect      VTE Risk Mitigation (From admission, onward)      None            Discharge Planning   KATHERINE:      Code Status: DNR   Medical Readiness for Discharge Date:   Discharge Plan A: Skilled Nursing Facility   Discharge Delays: None known at this time            Please place Justification for DME        Yoselin Rodriguez MD  Department of Hospital Medicine   [facility] - Telemetry ([facility])

## 2025-01-25 NOTE — ASSESSMENT & PLAN NOTE
Hyperkalemia is likely due to  metabolic acidosis .The patients most recent potassium results are listed below.  Recent Labs     01/23/25  0551 01/24/25  0514 01/25/25  0501   K 4.0 4.2 4.1       Plan  - Monitor for arrhythmias with EKG and/or continuous telemetry.   - - Monitor potassium: Daily  - The patient's hyperkalemia is resolved

## 2025-01-25 NOTE — ASSESSMENT & PLAN NOTE
Anemia is likely due to chronic disease due to Chronic Kidney Disease. Most recent hemoglobin and hematocrit are listed below.  Recent Labs     01/23/25  0551 01/24/25  0514 01/25/25  0501   HGB 9.6* 8.3* 9.0*   HCT 31.9* 27.4* 29.3*       Plan  - Monitor serial CBC: Daily  - Transfuse PRBC if patient becomes hemodynamically unstable, symptomatic or H/H drops below 7/21.  - Patient has not received any PRBC transfusions to date  - Patient's anemia is currently stable

## 2025-01-26 LAB
ANION GAP SERPL CALC-SCNC: 13 MMOL/L (ref 8–16)
BASOPHILS # BLD AUTO: ABNORMAL K/UL (ref 0–0.2)
BASOPHILS NFR BLD: 0 % (ref 0–1.9)
BUN SERPL-MCNC: 50 MG/DL (ref 8–23)
CALCIUM SERPL-MCNC: 9.9 MG/DL (ref 8.7–10.5)
CHLORIDE SERPL-SCNC: 104 MMOL/L (ref 95–110)
CO2 SERPL-SCNC: 20 MMOL/L (ref 23–29)
CREAT SERPL-MCNC: 1.1 MG/DL (ref 0.5–1.4)
DIFFERENTIAL METHOD BLD: ABNORMAL
EOSINOPHIL # BLD AUTO: ABNORMAL K/UL (ref 0–0.5)
EOSINOPHIL NFR BLD: 4 % (ref 0–8)
ERYTHROCYTE [DISTWIDTH] IN BLOOD BY AUTOMATED COUNT: 15.3 % (ref 11.5–14.5)
EST. GFR  (NO RACE VARIABLE): 51 ML/MIN/1.73 M^2
GLUCOSE SERPL-MCNC: 88 MG/DL (ref 70–110)
HCT VFR BLD AUTO: 32.9 % (ref 37–48.5)
HGB BLD-MCNC: 9.9 G/DL (ref 12–16)
IMM GRANULOCYTES # BLD AUTO: ABNORMAL K/UL (ref 0–0.04)
IMM GRANULOCYTES NFR BLD AUTO: ABNORMAL % (ref 0–0.5)
LYMPHOCYTES # BLD AUTO: ABNORMAL K/UL (ref 1–4.8)
LYMPHOCYTES NFR BLD: 28 % (ref 18–48)
MAGNESIUM SERPL-MCNC: 1.7 MG/DL (ref 1.6–2.6)
MCH RBC QN AUTO: 30.5 PG (ref 27–31)
MCHC RBC AUTO-ENTMCNC: 30.1 G/DL (ref 32–36)
MCV RBC AUTO: 101 FL (ref 82–98)
MONOCYTES # BLD AUTO: ABNORMAL K/UL (ref 0.3–1)
MONOCYTES NFR BLD: 8 % (ref 4–15)
NEUTROPHILS NFR BLD: 60 % (ref 38–73)
NRBC BLD-RTO: 0 /100 WBC
PHOSPHATE SERPL-MCNC: 2.3 MG/DL (ref 2.7–4.5)
PLATELET # BLD AUTO: 353 K/UL (ref 150–450)
PLATELET BLD QL SMEAR: ABNORMAL
PMV BLD AUTO: 9.5 FL (ref 9.2–12.9)
POTASSIUM SERPL-SCNC: 4 MMOL/L (ref 3.5–5.1)
RBC # BLD AUTO: 3.25 M/UL (ref 4–5.4)
SODIUM SERPL-SCNC: 137 MMOL/L (ref 136–145)
WBC # BLD AUTO: 7.43 K/UL (ref 3.9–12.7)

## 2025-01-26 PROCEDURE — 25000003 PHARM REV CODE 250: Performed by: STUDENT IN AN ORGANIZED HEALTH CARE EDUCATION/TRAINING PROGRAM

## 2025-01-26 PROCEDURE — 21400001 HC TELEMETRY ROOM

## 2025-01-26 PROCEDURE — 25000003 PHARM REV CODE 250: Performed by: INTERNAL MEDICINE

## 2025-01-26 PROCEDURE — 84100 ASSAY OF PHOSPHORUS: CPT | Performed by: STUDENT IN AN ORGANIZED HEALTH CARE EDUCATION/TRAINING PROGRAM

## 2025-01-26 PROCEDURE — 25000242 PHARM REV CODE 250 ALT 637 W/ HCPCS: Performed by: STUDENT IN AN ORGANIZED HEALTH CARE EDUCATION/TRAINING PROGRAM

## 2025-01-26 PROCEDURE — 63600175 PHARM REV CODE 636 W HCPCS: Performed by: NURSE PRACTITIONER

## 2025-01-26 PROCEDURE — 94761 N-INVAS EAR/PLS OXIMETRY MLT: CPT

## 2025-01-26 PROCEDURE — 25000242 PHARM REV CODE 250 ALT 637 W/ HCPCS: Performed by: INTERNAL MEDICINE

## 2025-01-26 PROCEDURE — 97535 SELF CARE MNGMENT TRAINING: CPT

## 2025-01-26 PROCEDURE — 97530 THERAPEUTIC ACTIVITIES: CPT

## 2025-01-26 PROCEDURE — 85027 COMPLETE CBC AUTOMATED: CPT | Performed by: STUDENT IN AN ORGANIZED HEALTH CARE EDUCATION/TRAINING PROGRAM

## 2025-01-26 PROCEDURE — 97530 THERAPEUTIC ACTIVITIES: CPT | Mod: CQ

## 2025-01-26 PROCEDURE — 80048 BASIC METABOLIC PNL TOTAL CA: CPT | Performed by: STUDENT IN AN ORGANIZED HEALTH CARE EDUCATION/TRAINING PROGRAM

## 2025-01-26 PROCEDURE — 97116 GAIT TRAINING THERAPY: CPT | Mod: CQ

## 2025-01-26 PROCEDURE — 94640 AIRWAY INHALATION TREATMENT: CPT

## 2025-01-26 PROCEDURE — 63600175 PHARM REV CODE 636 W HCPCS: Performed by: INTERNAL MEDICINE

## 2025-01-26 PROCEDURE — 25000003 PHARM REV CODE 250: Performed by: NURSE PRACTITIONER

## 2025-01-26 PROCEDURE — 83735 ASSAY OF MAGNESIUM: CPT | Performed by: STUDENT IN AN ORGANIZED HEALTH CARE EDUCATION/TRAINING PROGRAM

## 2025-01-26 PROCEDURE — 85007 BL SMEAR W/DIFF WBC COUNT: CPT | Performed by: STUDENT IN AN ORGANIZED HEALTH CARE EDUCATION/TRAINING PROGRAM

## 2025-01-26 RX ORDER — METOPROLOL TARTRATE 50 MG/1
50 TABLET ORAL 2 TIMES DAILY
Status: DISCONTINUED | OUTPATIENT
Start: 2025-01-26 | End: 2025-01-26

## 2025-01-26 RX ORDER — RANOLAZINE 500 MG/1
1000 TABLET, EXTENDED RELEASE ORAL 2 TIMES DAILY
Status: DISCONTINUED | OUTPATIENT
Start: 2025-01-26 | End: 2025-01-28 | Stop reason: HOSPADM

## 2025-01-26 RX ORDER — SACUBITRIL AND VALSARTAN 24; 26 MG/1; MG/1
1 TABLET, FILM COATED ORAL 2 TIMES DAILY
Status: DISCONTINUED | OUTPATIENT
Start: 2025-01-26 | End: 2025-01-28 | Stop reason: HOSPADM

## 2025-01-26 RX ORDER — ISOSORBIDE MONONITRATE 60 MG/1
120 TABLET, EXTENDED RELEASE ORAL NIGHTLY
Status: DISCONTINUED | OUTPATIENT
Start: 2025-01-26 | End: 2025-01-26

## 2025-01-26 RX ORDER — ROFLUMILAST 500 UG/1
1 TABLET ORAL DAILY
Status: DISCONTINUED | OUTPATIENT
Start: 2025-01-26 | End: 2025-01-28 | Stop reason: HOSPADM

## 2025-01-26 RX ORDER — METOPROLOL TARTRATE 25 MG/1
25 TABLET, FILM COATED ORAL 2 TIMES DAILY
Status: DISCONTINUED | OUTPATIENT
Start: 2025-01-26 | End: 2025-01-28 | Stop reason: HOSPADM

## 2025-01-26 RX ORDER — CLOPIDOGREL BISULFATE 75 MG/1
75 TABLET ORAL DAILY
Status: DISCONTINUED | OUTPATIENT
Start: 2025-01-27 | End: 2025-01-28 | Stop reason: HOSPADM

## 2025-01-26 RX ADMIN — BENZONATATE 200 MG: 100 CAPSULE ORAL at 08:01

## 2025-01-26 RX ADMIN — RANOLAZINE 1000 MG: 500 TABLET, EXTENDED RELEASE ORAL at 08:01

## 2025-01-26 RX ADMIN — IPRATROPIUM BROMIDE AND ALBUTEROL SULFATE 3 ML: 2.5; .5 SOLUTION RESPIRATORY (INHALATION) at 07:01

## 2025-01-26 RX ADMIN — SALINE NASAL SPRAY 1 SPRAY: 1.5 SOLUTION NASAL at 04:01

## 2025-01-26 RX ADMIN — FUROSEMIDE 20 MG: 20 TABLET ORAL at 08:01

## 2025-01-26 RX ADMIN — BENZONATATE 200 MG: 100 CAPSULE ORAL at 01:01

## 2025-01-26 RX ADMIN — MORPHINE SULFATE 2 MG: 2 INJECTION, SOLUTION INTRAMUSCULAR; INTRAVENOUS at 01:01

## 2025-01-26 RX ADMIN — SALINE NASAL SPRAY 1 SPRAY: 1.5 SOLUTION NASAL at 08:01

## 2025-01-26 RX ADMIN — SALINE NASAL SPRAY 1 SPRAY: 1.5 SOLUTION NASAL at 09:01

## 2025-01-26 RX ADMIN — ESCITALOPRAM OXALATE 20 MG: 10 TABLET, FILM COATED ORAL at 08:01

## 2025-01-26 RX ADMIN — LEVETIRACETAM 1500 MG: 500 TABLET, FILM COATED ORAL at 08:01

## 2025-01-26 RX ADMIN — GABAPENTIN 300 MG: 300 CAPSULE ORAL at 08:01

## 2025-01-26 RX ADMIN — PANTOPRAZOLE SODIUM 40 MG: 40 TABLET, DELAYED RELEASE ORAL at 08:01

## 2025-01-26 RX ADMIN — ARFORMOTEROL TARTRATE 15 MCG: 15 SOLUTION RESPIRATORY (INHALATION) at 07:01

## 2025-01-26 RX ADMIN — BUDESONIDE INHALATION 0.5 MG: 0.5 SUSPENSION RESPIRATORY (INHALATION) at 07:01

## 2025-01-26 RX ADMIN — OXYCODONE HYDROCHLORIDE AND ACETAMINOPHEN 500 MG: 500 TABLET ORAL at 08:01

## 2025-01-26 RX ADMIN — GUAIFENESIN AND DEXTROMETHORPHAN HYDROBROMIDE 2 TABLET: 600; 30 TABLET, EXTENDED RELEASE ORAL at 08:01

## 2025-01-26 RX ADMIN — RANOLAZINE 1000 MG: 500 TABLET, EXTENDED RELEASE ORAL at 09:01

## 2025-01-26 RX ADMIN — METOPROLOL TARTRATE 50 MG: 50 TABLET, FILM COATED ORAL at 09:01

## 2025-01-26 RX ADMIN — SACUBITRIL AND VALSARTAN 1 TABLET: 24; 26 TABLET, FILM COATED ORAL at 09:01

## 2025-01-26 RX ADMIN — ATORVASTATIN CALCIUM 40 MG: 40 TABLET, FILM COATED ORAL at 08:01

## 2025-01-26 RX ADMIN — PROMETHAZINE HYDROCHLORIDE AND CODEINE PHOSPHATE 5 ML: 6.25; 1 SOLUTION ORAL at 10:01

## 2025-01-26 RX ADMIN — PREDNISONE 20 MG: 20 TABLET ORAL at 08:01

## 2025-01-26 RX ADMIN — PROMETHAZINE HYDROCHLORIDE AND CODEINE PHOSPHATE 5 ML: 6.25; 1 SOLUTION ORAL at 04:01

## 2025-01-26 RX ADMIN — PROMETHAZINE HYDROCHLORIDE AND CODEINE PHOSPHATE 5 ML: 6.25; 1 SOLUTION ORAL at 08:01

## 2025-01-26 RX ADMIN — IPRATROPIUM BROMIDE AND ALBUTEROL SULFATE 3 ML: 2.5; .5 SOLUTION RESPIRATORY (INHALATION) at 12:01

## 2025-01-26 RX ADMIN — SACUBITRIL AND VALSARTAN 1 TABLET: 24; 26 TABLET, FILM COATED ORAL at 08:01

## 2025-01-26 RX ADMIN — THERA TABS 1 TABLET: TAB at 08:01

## 2025-01-26 RX ADMIN — IPRATROPIUM BROMIDE AND ALBUTEROL SULFATE 3 ML: 2.5; .5 SOLUTION RESPIRATORY (INHALATION) at 01:01

## 2025-01-26 RX ADMIN — ROFLUMILAST 500 MCG: 500 TABLET ORAL at 09:01

## 2025-01-26 RX ADMIN — MONTELUKAST 10 MG: 10 TABLET, FILM COATED ORAL at 08:01

## 2025-01-26 NOTE — PROGRESS NOTES
[facility] Medicine  Progress Note    Patient Name: Shireen Felix  MRN: [id number]  Patient Class: IP- Inpatient   Admission Date: 12/31/2024  Length of Stay: 25 days  Attending Physician: Yoselin Rodriguez MD  Primary Care Provider: Laron Chaudhari MD        Subjective     Principal Problem:UIP (usual interstitial pneumonitis)        HPI:  Shireen Felix is a 79 year old female who has  has a past medical history of Abnormal ECG, Acute bronchitis and bronchiolitis, Aneurysm, Anticoagulant long-term use, Arthritis, CAD in native artery, COPD (chronic obstructive pulmonary disease), Diabetes mellitus, Fall, Glaucoma, Hypertension, Seizures, Shingles, and Stroke who presented to Emergency Department for evaluation for cough. Associated symptoms include: congestion, wheezing, and SOB. She was seen by her pulmonologist today, Dr. Sam. Started as sinus pain and drainage and now has progressed to respiratory symptoms. She was seen a few days ago in the ER given prescription for antibiotics steroids but these have not helped much. Today in the office she can barely talk without coughing continually. She is having a difficult time putting more than 3 words together without severe cough paroxysms. ED workup showed Hgb/Hct 11.9/37.0, K+ 3.1, CO2 16, BUN/creatinine 34/1.5. CT chest w/out contrast stable suspected pulmonary fibrosis of the lungs. No acute infiltrates. She has received DuoNeb x 1, solumedrol 125 mg IV x 1, KCL 40 mEq PO x 1 and NS 1L bolus. Pt admitted for COVID.     Overview/Hospital Course:  Admitted to Hospital Medicine for acute on chronic respiratory failure concerns in setting of COVID-19.  Started on IV methylprednisolone, remdesivir, scheduled respiratory treatments. 01/04: Reported left lower quadrant abdominal pain with tenderness to palpation, will evaluate via CT as patient is on therapeutic anticoagulation for COVID-19.  CT abdomen/pelvis nonacute.  01/05:  Reported improvements in respiratory status with decreased frequency of cough and dyspnea but still reporting significant symptoms from baseline.  On 1/6, patient had a significant event with acute tachypnea, tachycardia. Pulmonology consulted, orders adjusted. Patient stable overnight, had another event on 1/7 similar to previous day. Possible exacerbation related to anxiety provoked by medications and congestion. Case reviewed, orders adjusted. Per Pulmonology, due to underlying lung fibrosis, patient is likely not benefiting from bronchodilators. Stopped on 1/7, patient did not experience any further episodes of anxiety, respiratory distress. Will continue xanax and prn morphine. Patient with epistaxis, small clots in tissue noted. Reduced supplemental O2 to 1L at rest, stopped full dose lovenox and placed on ppx dose. Hgb stable.   Difficulty with discharge plan, patient originally planned to d/c to SNF, now prefers to d/c home.  Pulmonology discussed home palliative care vs hospice.  Patient and family wish to discuss the Services further.  Per discussion with patient, family will be available for an informational visit on Monday.  Educated patient on results of oxygen evaluation, advised patient to reduce oxygen to 2 L or less at rest, requires 4 L with exertion.  As clinically indicated if significant improvement noted will repeat oxygen evaluation.     01/12/2025  O2 sats stable on 2L (while at rest). Patient and family wants to try and go home, but unable to participate with PT over weekend. Repeat desat study on Monday. May have to consider palliative measures if progress plateaus.      01/13/2025  Feeling slightly better today than she felt a few days ago but overall still significantly short of breath with exertion.  Discussed with her, palliative Care, and her family.  She does not want to change to hospice at this time.  She wants to try to get better.  She does want to be DNR though.   Appreciate further palliative care involvement.  Still desaturating significantly with exertion and unable to work with PT because of this    01/14/2025  Feeling perhaps slightly better today, attempting to work with physical therapy.  We are hoping that we could either get her to rehab if her oxygen requirements with exertion go down, or potentially get a bigger concentrator at home for increasing her up to 10 L with exertion at home.  Otherwise no changes to the plan    01/15/2025:  Still having coughing spells but notably improved.  Oxygen is being down titrated and she even got a room air today.  We are screening her for rehabs    01/16/2025:  Coughing she states is much worse when oxygen is off.  She was on room air but I put her on 1 L to see if there is truly an effect.  She is still being screened for rehabs.  Overall she looks much better.  She did technically have a fever today to 101.4 but when I evaluate her she looks notably better, CXR is negative for acute change, would not recommend starting antibiotics and she has defervesced    1/17/25: Somnolent this AM, spiking more fevers yesterday evening, and mildly soft BP this AM. Lactate wnl. Alert and oriented on my eval. CXR with ? Infiltrate, more cough, feeling worse. Will start ABX for PNA, blood cultures, sputum culture    01/18/2025: Significant epistaxis and hemoptysis overnight.  CT with and without contrast did not reveal a source of bleed.  Still coughing with hemoptysis this morning.  On exam with obvious epistaxis, packed by ENT, resolved.  Coughing has improved.  Currently on Ventimask to avoid nasal cannula     01/19/2025: Doing better today, no more hemoptysis, less cough, no more epistaxis, hemoglobin up slightly.  Still on Ventimask to avoid nasal cannula but only requiring minimal quantities of oxygen, can take it off to eat.  Listed precautions yesterday evening for COVID given has been more than 10 days since diagnosis    1/20/25:  reporting abdominal pain when coughing. States she reported yesterday as well.     1/21/25 patient reports she is feeling better. Abdominal pain with movement. Audible wheezing noted. Duonebs added    1/22/25: adjusted medications for cough. Awaiting placement    1/23/25: patient with persistent cough. Chest x-ray stable. Changed Mucinex to Mucinex DM. Continue steroids, slow taper    1/24/25: patient asking about home oxygen. Currently using venti mask for comfort. Home oxygen eval ordered again. Awaiting placement.    1/25/25: patient no longer requires oxygen. Cough is improving. Awaiting placement    1/26/25: Patient feeling better, cough present but better than earlier in the week. Awaiting placement stable on room air.     Interval History: f/u resp failure sitting in chair on rounds. Coughing less. Resumed home BP meds    Review of Systems  Objective:     Vital Signs (Most Recent):  Temp: 97.6 °F (36.4 °C) (01/26/25 1101)  Pulse: 89 (01/26/25 1335)  Resp: 16 (01/26/25 1335)  BP: (!) 140/62 (01/26/25 1101)  SpO2: 97 % (01/26/25 1335) Vital Signs (24h Range):  Temp:  [97.6 °F (36.4 °C)-98.4 °F (36.9 °C)] 97.6 °F (36.4 °C)  Pulse:  [] 89  Resp:  [16-20] 16  SpO2:  [92 %-100 %] 97 %  BP: (113-191)/(57-86) 140/62     Weight: 56 kg (123 lb 7.3 oz)  Body mass index is 21.19 kg/m².  No intake or output data in the 24 hours ending 01/26/25 1422      Physical Exam  HENT:      Head: Normocephalic and atraumatic.   Cardiovascular:      Rate and Rhythm: Normal rate and regular rhythm.      Heart sounds: No murmur heard.  Pulmonary:      Effort: Pulmonary effort is normal. No respiratory distress.      Breath sounds: Decreased breath sounds present. No wheezing.   Abdominal:      General: Bowel sounds are normal. There is no distension.      Palpations: Abdomen is soft.      Tenderness: There is no abdominal tenderness.   Musculoskeletal:         General: No swelling.   Skin:     General: Skin is warm and dry.    Neurological:      Mental Status: She is alert and oriented to person, place, and time. Mental status is at baseline.             Significant Labs: All pertinent labs within the past 24 hours have been reviewed.  Recent Lab Results         01/26/25  0513        Anion Gap 13       Baso # CANCELED  Comment: Result canceled by the ancillary.       Basophil % 0.0       BUN 50       Calcium 9.9       Chloride 104       CO2 20       Creatinine 1.1       Differential Method Manual       eGFR 51       Eos # CANCELED  Comment: Result canceled by the ancillary.       Eos % 4.0       Glucose 88       Gran % 60.0       Hematocrit 32.9       Hemoglobin 9.9       Immature Grans (Abs) CANCELED  Comment: Mild elevation in immature granulocytes is non specific and   can be seen in a variety of conditions including stress response,   acute inflammation, trauma and pregnancy. Correlation with other   laboratory and clinical findings is essential.    Result canceled by the ancillary.         Immature Granulocytes CANCELED  Comment: Result canceled by the ancillary.       Lymph # CANCELED  Comment: Result canceled by the ancillary.       Lymph % 28.0       Magnesium  1.7       MCH 30.5       MCHC 30.1              Mono # CANCELED  Comment: Result canceled by the ancillary.       Mono % 8.0       MPV 9.5       nRBC 0       Phosphorus Level 2.3       Platelet Estimate Appears normal       Platelet Count 353       Potassium 4.0       RBC 3.25       RDW 15.3       Sodium 137       WBC 7.43               Significant Imaging: I have reviewed all pertinent imaging results/findings within the past 24 hours.    Assessment and Plan     * UIP (usual interstitial pneumonitis)  Pulm fibrosis hx- followed by pulmonology outpatient  Likely had flare of pulmonary fibrosis secondary to the COVID infection  Weaned to room air  Continue steroids, slow taper      Hyperkalemia  Hyperkalemia is likely due to  metabolic acidosis .The patients most  recent potassium results are listed below.  Recent Labs     01/24/25  0514 01/25/25  0501 01/26/25  0513   K 4.2 4.1 4.0       Plan  - Monitor for arrhythmias with EKG and/or continuous telemetry.   - - Monitor potassium: Daily  - The patient's hyperkalemia is resolved            Atrial fibrillation  Patient with paroxysmal (<7 days) atrial fibrillation which is {:controlled currently without medication. Patient is currently in sinus rhythm.[id number] Score: 5. HASBLED Score: . Anticoagulation Patient is not anticoagulated due to bleeding risk .           Nasal septal deviation        Hemoptysis  Likely secondary to the epistaxis.  CT with and without contrast negative.  On TXA nebs but if no further hemoptysis may be able to stop soon.  Likely exacerbated by the coughing so adding cough syrup with codeine today in addition to her other antitussives. Improved significantly after addressing the epistaxis 01/18/2025      Hospital acquired PNA  -completed course of cefepime    Encounter for palliative care  Palliative Care is following.  Receiving hydrocodone/acetaminophen p.r.n., morphine p.r.n., alprazolam p.r.n. for respiratory distress with good effect      Epistaxis  Likely secondary to supplemental O2, septal defect.  ENT packed the nose 1/18/25 and it seems to have resolved.  I think this is the cause of hemoptysis.  Pulm saw the patient as well and she is on tranexamic acid nebs although we do not feel the bleeding is coming from the lungs.  CT with and without contrast did not reveal a source of bleeding.  Hemoglobin has been dropping over the past few days but is not increasing after addressing the epistaxis.    -Mucomyst nasal spray to keep the septum moist    Acute on chronic respiratory failure with hypoxia  Patient with Hypoxic Respiratory failure which is Acute on chronic.  she is not on home oxygen. Supplemental oxygen was provided and noted- Oxygen Concentration (%):  [21] 21    .   Signs/symptoms of  "respiratory failure include- tachypnea, increased work of breathing, respiratory distress, use of accessory muscles, wheezing, stridor, and lethargy. Contributing diagnoses includes - Interstitial lung disease Labs and images were reviewed. Patient Has recent ABG, which has been reviewed. Will treat underlying causes and adjust management of respiratory failure as follows-   -did not qualify for oxygen, currently stable on room air  -resolved  -continue steroids, slow taper                           CKD (chronic kidney disease)  Creatine stable for now. BMP reviewed- noted Estimated Creatinine Clearance: 35.2 mL/min (based on SCr of 1.1 mg/dL). according to latest data. Based on current GFR, CKD stage is stage 4 - GFR 15-29.  Monitor UOP and serial BMP and adjust therapy as needed. Renally dose meds. Avoid nephrotoxic medications and procedures.  -Creatinine 1.5 on admission however 3 months ago was 1.2    Recent Labs     01/24/25  0514 01/25/25  0501 01/26/25  0513   CREATININE 1.2 1.2 1.1             Chronic systolic heart failure  Patient has Diastolic (HFpEF) heart failure that is Chronic. On presentation their CHF was well compensated. Most recent BNP and echo results are listed below.  No results for input(s): "BNP" in the last 72 hours.    Latest ECHO  Results for orders placed during the hospital encounter of 06/02/23    Echo    Interpretation Summary  · The left ventricle is normal in size with concentric hypertrophy and low normal systolic function.  · The estimated ejection fraction is 50%.  · Normal left ventricular diastolic function.  · There is abnormal septal wall motion consistent with left bundle branch block.  · Normal right ventricular size with normal right ventricular systolic function.  · Normal central venous pressure (3 mmHg).  · The estimated PA systolic pressure is 30 mmHg.  · Mild mitral regurgitation.    Current Heart Failure Medications  furosemide tablet 20 mg, Daily, " Oral  sacubitriL-valsartan 24-26 mg per tablet 1 tablet, 2 times daily, Oral    Plan  - Monitor strict I&Os and daily weights.    - Place on telemetry  - Low sodium diet  - Place on fluid restriction of 1.5 L.   - Cardiology has not been consulted  - The patient's volume status is at their baseline  -         COVID-19  ** initially COVID positive on 12/31 on admission **   Patient is identified as Severe COVID-19 based on hypoxemia with O2 saturations <94% on room air or on ambulation   Initiated standard COVID protocols; COVID-19 testing ,Infection Control notification  and isolation- respiratory, contact and droplet per protocol    Management: Treated with targetedtherapy with Remdesivir, 200mg IV x1, followed by 100mg IV daily x5 days total and Anticoagulation, Patient admitted to non-critical care unit- Will initiate full dose anticoagulation with Weight based lovenox 1mg/kg IV q12, stopped on 1/11/25, Maintain oxygen saturations 92-96% via Nasal Cannula  and monitor with continuous/intermittent pulse oximetry. , and Continuous cardiac monitoring.    As of 01/18/2025 DC'd  precautions as it has been more than 10 days since onset, and her persistent symptoms are likely a flare of her interstitial lung disease/postviral pneumonia     Mixed hyperlipidemia  Lab Results   Component Value Date    CHOL 112 (L) 07/12/2024    HDL 41 07/12/2024    LDLCALC 49.8 (L) 07/12/2024    TRIG 106 07/12/2024     -Continue Statin    Fall  Fall night of 1/15 with head strike   Evaluated by nighttime hospitalist   Head CT was negative   Neuro exam on 01/16 is normal   Fall precautions  Continue therapy      Normocytic anemia  Anemia is likely due to chronic disease due to Chronic Kidney Disease. Most recent hemoglobin and hematocrit are listed below.  Recent Labs     01/24/25  0514 01/25/25  0501 01/26/25  0513   HGB 8.3* 9.0* 9.9*   HCT 27.4* 29.3* 32.9*       Plan  - Monitor serial CBC: Daily  - Transfuse PRBC if patient becomes  hemodynamically unstable, symptomatic or H/H drops below 7/21.  - Patient has not received any PRBC transfusions to date  - Patient's anemia is currently stable      Seizure  Hx of seizures, patient has not had a seizure during this admission.   At home is on seizure medication Aptiom 400mg daily and Keppra 1,000mg BID  Aptiom likely worsening hyponatremia and also inhibiting SNF placement due to cost, so discussed with neuro on call  Per neuro on call, would DC Aptiom and increase Keppra to 1,500mg BID, change made  Can follow up with neuro as outpatient to revisit        Coronary artery disease of native artery of native heart with stable angina pectoris  Patient with known CAD  No anginal signs or symptoms   On Plavix and statin as an outpatient   Resume Plavix      COPD exacerbation  Scheduled Duonebs  Continue oral and inhaled steroids  LABA    GERD (gastroesophageal reflux disease)  -Continue PPI      Nasal septal perforation  Was likely contributing to the epistaxis   Seen by ENT   Packing in place   No further bleeding   Mucomyst nasal spray to the septal defect      VTE Risk Mitigation (From admission, onward)      None            Discharge Planning   KATHERINE:      Code Status: DNR   Medical Readiness for Discharge Date:   Discharge Plan A: Skilled Nursing Facility   Discharge Delays: None known at this time            Please place Justification for DME        Yoselin Rodriguez MD  Department of Hospital Medicine   [facility] - Telemetry ([facility])

## 2025-01-26 NOTE — ASSESSMENT & PLAN NOTE
Creatine stable for now. BMP reviewed- noted Estimated Creatinine Clearance: 35.2 mL/min (based on SCr of 1.1 mg/dL). according to latest data. Based on current GFR, CKD stage is stage 4 - GFR 15-29.  Monitor UOP and serial BMP and adjust therapy as needed. Renally dose meds. Avoid nephrotoxic medications and procedures.  -Creatinine 1.5 on admission however 3 months ago was 1.2    Recent Labs     01/24/25  0514 01/25/25  0501 01/26/25  0513   CREATININE 1.2 1.2 1.1

## 2025-01-26 NOTE — PT/OT/SLP PROGRESS
"Occupational Therapy   Treatment    Name: Shireen Felix  MRN: [id number]  Admitting Diagnosis:  UIP (usual interstitial pneumonitis)       Recommendations:     Discharge Recommendations: Moderate Intensity Therapy  Discharge Equipment Recommendations:  to be determined by next level of care  Barriers to discharge:  Decreased caregiver support    Assessment:     Shireen Felix is a 79 y.o. female with a medical diagnosis of UIP (usual interstitial pneumonitis).  She presents with the following performance deficits affecting function are weakness, impaired endurance, impaired functional mobility, gait instability, impaired balance, decreased coordination, decreased lower extremity function, decreased upper extremity function, decreased safety awareness, impaired coordination, impaired fine motor, impaired cardiopulmonary response to activity.     Rehab Prognosis:  Good; patient would benefit from acute skilled OT services to address these deficits and reach maximum level of function.       Plan:     Patient to be seen 2 x/week to address the above listed problems via self-care/home management, therapeutic activities, therapeutic exercises  Plan of Care Expires: 01/17/25  Plan of Care Reviewed with: patient    Subjective     Chief Complaint: Coughing  Patient/Family Comments/goals: "Go home"  Pain/Comfort:  Pain Rating 1: 0/10    Objective:     Communicated with: Lelia and MARC prior to session.  Patient found up in chair with telemetry upon OT entry to room.    General Precautions: Standard, respiratory, fall    Orthopedic Precautions:N/A  Braces: N/A  Respiratory Status: Room air     Occupational Performance:     Pt refused FM due to fatigue    Activities of Daily Living:  Grooming: setup assist washing face  Grooming: setup assist washing hair    AMPAC 6 Click ADL: 19    Treatment & Education:  Encouraged completion of B UE AROM therex throughout the day to increase functional strength and activity " tolerance needed for ADL completion.  Pt completed the following Therex 1x10 in order to increase BM and FM:   Shoulder flexion   Elbow Flexion   Chair presses  OT role, plan of care, progression of goals, importance of continued OOB activity, ADL/functional transfer and mobility retraining, call don't fall, safety precautions, fall prevention. Pt educated on sitting in chair for 1 hour during breakfast lunch and dinner in order to change positions, regulate BP and reduce bed sores.   Pt acknowledges and agrees with POC given by OT.      Patient left up in chair with all lines intact, call button in reach, and chair alarm on    GOALS:   Multidisciplinary Problems       Occupational Therapy Goals          Problem: Occupational Therapy    Goal Priority Disciplines Outcome Interventions   Occupational Therapy Goal     OT, PT/OT Progressing    Description: O.T GOALS MET BY 1-17-25  PT WILL TOLERATE  1 SET X 12 REPS B UE ROM EXERCISE  S WITH UE DRESSING  S WITH LE DRESSING  S WITH TOILET TRANSFERS                           Time Tracking:     OT Date of Treatment: 01/26/25  OT Start Time: 1100  OT Stop Time: 1125  OT Total Time (min): 25 min    Billable Minutes:Self Care/Home Management 10  Therapeutic Activity 15    OT/CORINA: CORINA     Number of CORINA visits since last OT visit: 4  HOANG Tovar    1/26/2025

## 2025-01-26 NOTE — ASSESSMENT & PLAN NOTE
Anemia is likely due to chronic disease due to Chronic Kidney Disease. Most recent hemoglobin and hematocrit are listed below.  Recent Labs     01/24/25  0514 01/25/25  0501 01/26/25  0513   HGB 8.3* 9.0* 9.9*   HCT 27.4* 29.3* 32.9*       Plan  - Monitor serial CBC: Daily  - Transfuse PRBC if patient becomes hemodynamically unstable, symptomatic or H/H drops below 7/21.  - Patient has not received any PRBC transfusions to date  - Patient's anemia is currently stable

## 2025-01-26 NOTE — ASSESSMENT & PLAN NOTE
Patient with known CAD  No anginal signs or symptoms   On Plavix and statin as an outpatient   Resume Plavix

## 2025-01-26 NOTE — PLAN OF CARE
Pt found up in chair. Pt stated fatigue from transfer and refused FM. Pt completed ADLs washing hair and face wash and education

## 2025-01-26 NOTE — PT/OT/SLP PROGRESS
Physical Therapy  Treatment    Shireen Felix   MRN: [id number]   Admitting Diagnosis: UIP (usual interstitial pneumonitis)       PT Start Time: 1055     PT Stop Time: 1120    PT Total Time (min): 25 min       Billable Minutes:  Gait Training 15 and Therapeutic Activity 10    Treatment Type: Treatment  PT/PTA: PTA     Number of PTA visits since last PT visit: 2       General Precautions: Standard, respiratory, fall  Orthopedic Precautions: N/A  Braces: N/A  Respiratory Status: Room air    Spiritual, Cultural Beliefs, Taoist Practices, Values that Affect Care: no    Subjective:  Communicated with CHRIS prior to session.      Pain/Comfort  Pain Rating 1: 0/10  Pain Rating Post-Intervention 1: 0/10    Treatment and Education:    REPORTS FEELING OK TODAY BUT THAT HER BEST DAY FOR THE LAST 25 DAYS WAS YESTERDAY.     PATIENT SITTING IN CHAIR UPON ARRIVAL    SIT<-->STAND SBA    AMBULATED WITH RW 2 X 32' IN ROOM CG WITH CONVERSATION. SLOW DAY WHICH SLOWED MORE WITH FATIGUE. MINIMAL POSTERIOR IMBALANCE WITH FATIGUE BUT REQUIRED NO ADDITIONAL SUPPORT TO REGAIN HER BALANCE.     PATIENT OPTED TO SIT IN CHAIR UNTIL LUNCH. EDUCATED ON CALL DON'T FALL PROCEDURE.     FATIGUED AFTER THERAPY. GOOD PARTICIPATION. NOT WANTING TO GO IN [facility] [facility]UE TO TOO MANY GERMS IN HALLWAY FOR HER AT THIS TIME.      AM-PAC 6 CLICK MOBILITY  How much help from another person does this patient currently need?   1 = Unable, Total/Dependent Assistance  2 = A lot, Maximum/Moderate Assistance  3 = A little, Minimum/Contact Guard/Supervision  4 = None, Modified Felda/Independent    Turning over in bed (including adjusting bedclothes, sheets and blankets)?:  (NA)  Sitting down on and standing up from a chair with arms (e.g., wheelchair, bedside commode, etc.): 4  Moving from lying on back to sitting on the side of the bed?:  (NA)  Moving to and from a bed to a chair (including a wheelchair)?:  (NA)  Need to walk in hospital room?: 3  Climbing  3-5 steps with a railing?: 1    AM-PAC Raw Score CMS G-Code Modifier Level of Impairment Assistance   6 % Total / Unable   7 - 9 CM 80 - 100% Maximal Assist   10 - 14 CL 60 - 80% Moderate Assist   15 - 19 CK 40 - 60% Moderate Assist   20 - 22 CJ 20 - 40% Minimal Assist   23 CI 1-20% SBA / CGA   24 CH 0% Independent/ Mod I     Patient left up in chair with all lines intact and call button in reach.    Assessment:  Shireen Felix is a 79 y.o. female with a medical diagnosis of UIP (usual interstitial pneumonitis) and presents with .    Rehab identified problem list/impairments: weakness, gait instability, decreased upper extremity function, impaired cardiopulmonary response to activity, decreased lower extremity function, impaired balance, impaired endurance, impaired self care skills, impaired functional mobility    Rehab potential is good.    Activity tolerance: Good    Discharge recommendations: Moderate Intensity Therapy      Barriers to discharge:      Equipment recommendations: walker, rolling     GOALS:   Multidisciplinary Problems       Physical Therapy Goals          Problem: Physical Therapy    Goal Priority Disciplines Outcome Interventions   Physical Therapy Goal     PT, PT/OT Progressing    Description: Goals to be met by 1/31/25.  1. Pt will complete bed mobility S.  2. Pt will complete sit to stand MIN A .  3. Pt will ambulate 50Ft WITH MIN A using RW.  4. Pt will increase AMPAC score by 2 points to progress functional mobility.                       DME Justifications:   Cortezs mobility limitation cannot be sufficiently resolved by the use of a cane. Her functional mobility deficit can be sufficiently resolved with the use of a Rolling Walker. Patient's mobility limitation significantly impairs their ability to participate in one of more activities of daily living.  The use of a RW will significantly improve the patient's ability to participate in MRADLS and the patient will use it on  regular basis in the home.    PLAN:    Patient to be seen 3 x/week to address the above listed problems via gait training, therapeutic activities, therapeutic exercises  Plan of Care expires: 01/31/25  Plan of Care reviewed with: patient         01/26/2025

## 2025-01-26 NOTE — PLAN OF CARE
A223/A223 OLU Felix is a 79 y.o.female admitted on 12/31/2024 for UIP (usual interstitial pneumonitis)   Code Status: DNR MRN: [id number]   Review of patient's allergies indicates:   Allergen Reactions    Penicillins Anaphylaxis, Hives, Shortness Of Breath and Swelling    Penicillin     Adhesive Rash    Doxycycline Nausea Only    Oxycodone Other (See Comments)     Fatigue      Sulfa (sulfonamide antibiotics) Itching     Past Medical History:   Diagnosis Date    Abnormal ECG 08/05/2019    Acute bronchitis and bronchiolitis 12/31/2024    Aneurysm     Anticoagulant long-term use     Arthritis     CAD in native artery 08/05/2019    Diabetes mellitus     Fall 10/10/2019    Formatting of this note might be different from the original. Found sitting on floor next to bed last night Mild confusion today Does not recall falling or how she ended up on floor UA today Labs yesterday unremarkable    Glaucoma     Hypertension     Pulmonary fibrosis     Seizures     Shingles 05/27/2017    Stroke       PRN meds    acetaminophen, 650 mg, Q6H PRN  ALPRAZolam, 0.25 mg, TID PRN  calcium carbonate, 500 mg, TID PRN  dextrose 50%, 12.5 g, PRN  dextrose 50%, 25 g, PRN  glucagon (human recombinant), 1 mg, PRN  glucose, 16 g, PRN  glucose, 24 g, PRN  ipratropium, 0.5 mg, Q4H PRN  magic mouthwash (diphenhydrAMINE 12.5 mg/5 mL 20 mL, aluminum & magnesium hydroxide-simethicone (MYLANTA) 20 mL, LIDOcaine HCl 2% (XYLOCAINE) 20 mL) solution, 10 mL, Q4H PRN  melatonin, 6 mg, Nightly PRN  methocarbamoL, 500 mg, TID PRN  morphine, 2 mg, Q4H PRN  ondansetron, 4 mg, Q8H PRN  promethazine-codeine 6.25-10 mg/5 ml, 5 mL, Q4H PRN  sodium chloride 0.9%, 10 mL, PRN      Chart check completed. Will continue plan of care.        Pt oriented x4.  VSS.  Pt remained afebrile throughout this shift.   All meds administered per order.   Pt remained free of falls this shift.   Plan of care reviewed. Patient verbalizes understanding.   Pt moving/turning  independently.   Bed low, side rails up x 2, wheels locked, call light in reach.   Patient instructed to call for assistance.  Patient education provided  Pts restarted on Home BP medications today. Pt agreed to take some cough medication which did improve pts coughing and comfort today.            Orientation: oriented x 4  Anton Coma Scale Score: 15     Lead Monitored: Lead II Rhythm: sinus tachycardia Frequency/Ectopy: PACs, PVCs  Cardiac/Telemetry Box Number: [id number]  VTE Core Measure: Pharmacological prophylaxis initiated/maintained Last Bowel Movement: 01/25/25  Diet Cardiac Low Potassium; Isolation Tray - Styrofoam  Voiding Characteristics: urgency  Gilberto Score: 17  Fall Risk Score: 19  Accucheck []   Freq?      Lines/Drains/Airways       Peripheral Intravenous Line  Duration                  Midline Catheter - Single Lumen 01/17/25 1925 Left basilic vein (medial side of arm) other (see comments) 8 days

## 2025-01-26 NOTE — ASSESSMENT & PLAN NOTE
"Patient has Diastolic (HFpEF) heart failure that is Chronic. On presentation their CHF was well compensated. Most recent BNP and echo results are listed below.  No results for input(s): "BNP" in the last 72 hours.    Latest ECHO  Results for orders placed during the hospital encounter of 06/02/23    Echo    Interpretation Summary  · The left ventricle is normal in size with concentric hypertrophy and low normal systolic function.  · The estimated ejection fraction is 50%.  · Normal left ventricular diastolic function.  · There is abnormal septal wall motion consistent with left bundle branch block.  · Normal right ventricular size with normal right ventricular systolic function.  · Normal central venous pressure (3 mmHg).  · The estimated PA systolic pressure is 30 mmHg.  · Mild mitral regurgitation.    Current Heart Failure Medications  furosemide tablet 20 mg, Daily, Oral  sacubitriL-valsartan 24-26 mg per tablet 1 tablet, 2 times daily, Oral    Plan  - Monitor strict I&Os and daily weights.    - Place on telemetry  - Low sodium diet  - Place on fluid restriction of 1.5 L.   - Cardiology has not been consulted  - The patient's volume status is at their baseline  -       "

## 2025-01-26 NOTE — ASSESSMENT & PLAN NOTE
Hyperkalemia is likely due to  metabolic acidosis .The patients most recent potassium results are listed below.  Recent Labs     01/24/25  0514 01/25/25  0501 01/26/25  0513   K 4.2 4.1 4.0       Plan  - Monitor for arrhythmias with EKG and/or continuous telemetry.   - - Monitor potassium: Daily  - The patient's hyperkalemia is resolved

## 2025-01-26 NOTE — PLAN OF CARE
REPORTS FEELING OK TODAY BUT THAT HER BEST DAY FOR THE LAST 25 DAYS WAS YESTERDAY.     PATIENT SITTING IN CHAIR UPON ARRIVAL    SIT<-->STAND SBA    AMBULATED WITH RW 2 X 32' IN ROOM CG WITH CONVERSATION. SLOW DAY WHICH SLOWED MORE WITH FATIGUE. MINIMAL POSTERIOR IMBALANCE WITH FATIGUE BUT REQUIRED NO ADDITIONAL SUPPORT TO REGAIN HER BALANCE.     PATIENT OPTED TO SIT IN CHAIR UNTIL LUNCH. EDUCATED ON CALL DON'T FALL PROCEDURE.     FATIGUED AFTER THERAPY. GOOD PARTICIPATION. NOT WANTING TO GO IN [facility]UE TO TOO MANY GERMS IN HALLWAY FOR HER AT THIS TIME.

## 2025-01-26 NOTE — ASSESSMENT & PLAN NOTE
Patient with Hypoxic Respiratory failure which is Acute on chronic.  she is not on home oxygen. Supplemental oxygen was provided and noted- Oxygen Concentration (%):  [21] 21    .   Signs/symptoms of respiratory failure include- tachypnea, increased work of breathing, respiratory distress, use of accessory muscles, wheezing, stridor, and lethargy. Contributing diagnoses includes - Interstitial lung disease Labs and images were reviewed. Patient Has recent ABG, which has been reviewed. Will treat underlying causes and adjust management of respiratory failure as follows-   -did not qualify for oxygen, currently stable on room air  -resolved  -continue steroids, slow taper

## 2025-01-26 NOTE — SUBJECTIVE & OBJECTIVE
Interval History: f/u resp failure sitting in chair on rounds. Coughing less. Resumed home BP meds    Review of Systems  Objective:     Vital Signs (Most Recent):  Temp: 97.6 °F (36.4 °C) (01/26/25 1101)  Pulse: 89 (01/26/25 1335)  Resp: 16 (01/26/25 1335)  BP: (!) 140/62 (01/26/25 1101)  SpO2: 97 % (01/26/25 1335) Vital Signs (24h Range):  Temp:  [97.6 °F (36.4 °C)-98.4 °F (36.9 °C)] 97.6 °F (36.4 °C)  Pulse:  [] 89  Resp:  [16-20] 16  SpO2:  [92 %-100 %] 97 %  BP: (113-191)/(57-86) 140/62     Weight: 56 kg (123 lb 7.3 oz)  Body mass index is 21.19 kg/m².  No intake or output data in the 24 hours ending 01/26/25 1422      Physical Exam  HENT:      Head: Normocephalic and atraumatic.   Cardiovascular:      Rate and Rhythm: Normal rate and regular rhythm.      Heart sounds: No murmur heard.  Pulmonary:      Effort: Pulmonary effort is normal. No respiratory distress.      Breath sounds: Decreased breath sounds present. No wheezing.   Abdominal:      General: Bowel sounds are normal. There is no distension.      Palpations: Abdomen is soft.      Tenderness: There is no abdominal tenderness.   Musculoskeletal:         General: No swelling.   Skin:     General: Skin is warm and dry.   Neurological:      Mental Status: She is alert and oriented to person, place, and time. Mental status is at baseline.             Significant Labs: All pertinent labs within the past 24 hours have been reviewed.  Recent Lab Results         01/26/25  0513        Anion Gap 13       Baso # CANCELED  Comment: Result canceled by the ancillary.       Basophil % 0.0       BUN 50       Calcium 9.9       Chloride 104       CO2 20       Creatinine 1.1       Differential Method Manual       eGFR 51       Eos # CANCELED  Comment: Result canceled by the ancillary.       Eos % 4.0       Glucose 88       Gran % 60.0       Hematocrit 32.9       Hemoglobin 9.9       Immature Grans (Abs) CANCELED  Comment: Mild elevation in immature granulocytes is  non specific and   can be seen in a variety of conditions including stress response,   acute inflammation, trauma and pregnancy. Correlation with other   laboratory and clinical findings is essential.    Result canceled by the ancillary.         Immature Granulocytes CANCELED  Comment: Result canceled by the ancillary.       Lymph # CANCELED  Comment: Result canceled by the ancillary.       Lymph % 28.0       Magnesium  1.7       MCH 30.5       MCHC 30.1              Mono # CANCELED  Comment: Result canceled by the ancillary.       Mono % 8.0       MPV 9.5       nRBC 0       Phosphorus Level 2.3       Platelet Estimate Appears normal       Platelet Count 353       Potassium 4.0       RBC 3.25       RDW 15.3       Sodium 137       WBC 7.43               Significant Imaging: I have reviewed all pertinent imaging results/findings within the past 24 hours.

## 2025-01-27 LAB
ANION GAP SERPL CALC-SCNC: 9 MMOL/L (ref 8–16)
ANISOCYTOSIS BLD QL SMEAR: SLIGHT
BASO STIPL BLD QL SMEAR: ABNORMAL
BASOPHILS NFR BLD: 0 % (ref 0–1.9)
BUN SERPL-MCNC: 52 MG/DL (ref 8–23)
CALCIUM SERPL-MCNC: 9.1 MG/DL (ref 8.7–10.5)
CHLORIDE SERPL-SCNC: 104 MMOL/L (ref 95–110)
CO2 SERPL-SCNC: 22 MMOL/L (ref 23–29)
CREAT SERPL-MCNC: 1.1 MG/DL (ref 0.5–1.4)
DIFFERENTIAL METHOD BLD: ABNORMAL
EOSINOPHIL NFR BLD: 5 % (ref 0–8)
ERYTHROCYTE [DISTWIDTH] IN BLOOD BY AUTOMATED COUNT: 14.9 % (ref 11.5–14.5)
EST. GFR  (NO RACE VARIABLE): 51 ML/MIN/1.73 M^2
GIANT PLATELETS BLD QL SMEAR: PRESENT
GLUCOSE SERPL-MCNC: 88 MG/DL (ref 70–110)
HCT VFR BLD AUTO: 31.1 % (ref 37–48.5)
HGB BLD-MCNC: 9.5 G/DL (ref 12–16)
IMM GRANULOCYTES # BLD AUTO: ABNORMAL K/UL (ref 0–0.04)
IMM GRANULOCYTES NFR BLD AUTO: ABNORMAL % (ref 0–0.5)
LYMPHOCYTES NFR BLD: 18 % (ref 18–48)
MAGNESIUM SERPL-MCNC: 1.6 MG/DL (ref 1.6–2.6)
MCH RBC QN AUTO: 30.5 PG (ref 27–31)
MCHC RBC AUTO-ENTMCNC: 30.5 G/DL (ref 32–36)
MCV RBC AUTO: 100 FL (ref 82–98)
METAMYELOCYTES NFR BLD MANUAL: 3 %
MONOCYTES NFR BLD: 6 % (ref 4–15)
MYELOCYTES NFR BLD MANUAL: 7 %
NEUTROPHILS NFR BLD: 59 % (ref 38–73)
NEUTS BAND NFR BLD MANUAL: 2 %
NRBC BLD-RTO: 0 /100 WBC
PHOSPHATE SERPL-MCNC: 2.4 MG/DL (ref 2.7–4.5)
PLATELET # BLD AUTO: 292 K/UL (ref 150–450)
PLATELET BLD QL SMEAR: ABNORMAL
PMV BLD AUTO: 9.2 FL (ref 9.2–12.9)
POLYCHROMASIA BLD QL SMEAR: ABNORMAL
POTASSIUM SERPL-SCNC: 4.1 MMOL/L (ref 3.5–5.1)
RBC # BLD AUTO: 3.11 M/UL (ref 4–5.4)
SODIUM SERPL-SCNC: 135 MMOL/L (ref 136–145)
WBC # BLD AUTO: 7.46 K/UL (ref 3.9–12.7)

## 2025-01-27 PROCEDURE — 94640 AIRWAY INHALATION TREATMENT: CPT

## 2025-01-27 PROCEDURE — 25000003 PHARM REV CODE 250: Performed by: NURSE PRACTITIONER

## 2025-01-27 PROCEDURE — 85027 COMPLETE CBC AUTOMATED: CPT | Performed by: STUDENT IN AN ORGANIZED HEALTH CARE EDUCATION/TRAINING PROGRAM

## 2025-01-27 PROCEDURE — 25000003 PHARM REV CODE 250: Performed by: STUDENT IN AN ORGANIZED HEALTH CARE EDUCATION/TRAINING PROGRAM

## 2025-01-27 PROCEDURE — 63600175 PHARM REV CODE 636 W HCPCS: Performed by: INTERNAL MEDICINE

## 2025-01-27 PROCEDURE — 25000003 PHARM REV CODE 250: Performed by: INTERNAL MEDICINE

## 2025-01-27 PROCEDURE — 36415 COLL VENOUS BLD VENIPUNCTURE: CPT | Performed by: STUDENT IN AN ORGANIZED HEALTH CARE EDUCATION/TRAINING PROGRAM

## 2025-01-27 PROCEDURE — 21400001 HC TELEMETRY ROOM

## 2025-01-27 PROCEDURE — 84100 ASSAY OF PHOSPHORUS: CPT | Performed by: STUDENT IN AN ORGANIZED HEALTH CARE EDUCATION/TRAINING PROGRAM

## 2025-01-27 PROCEDURE — 83735 ASSAY OF MAGNESIUM: CPT | Performed by: STUDENT IN AN ORGANIZED HEALTH CARE EDUCATION/TRAINING PROGRAM

## 2025-01-27 PROCEDURE — 85007 BL SMEAR W/DIFF WBC COUNT: CPT | Performed by: STUDENT IN AN ORGANIZED HEALTH CARE EDUCATION/TRAINING PROGRAM

## 2025-01-27 PROCEDURE — 94761 N-INVAS EAR/PLS OXIMETRY MLT: CPT

## 2025-01-27 PROCEDURE — 80048 BASIC METABOLIC PNL TOTAL CA: CPT | Performed by: STUDENT IN AN ORGANIZED HEALTH CARE EDUCATION/TRAINING PROGRAM

## 2025-01-27 PROCEDURE — 25000242 PHARM REV CODE 250 ALT 637 W/ HCPCS: Performed by: STUDENT IN AN ORGANIZED HEALTH CARE EDUCATION/TRAINING PROGRAM

## 2025-01-27 PROCEDURE — 25000242 PHARM REV CODE 250 ALT 637 W/ HCPCS: Performed by: INTERNAL MEDICINE

## 2025-01-27 RX ADMIN — ARFORMOTEROL TARTRATE 15 MCG: 15 SOLUTION RESPIRATORY (INHALATION) at 07:01

## 2025-01-27 RX ADMIN — RANOLAZINE 1000 MG: 500 TABLET, EXTENDED RELEASE ORAL at 08:01

## 2025-01-27 RX ADMIN — POLYETHYLENE GLYCOL 3350 17 G: 17 POWDER, FOR SOLUTION ORAL at 08:01

## 2025-01-27 RX ADMIN — GABAPENTIN 300 MG: 300 CAPSULE ORAL at 09:01

## 2025-01-27 RX ADMIN — BENZONATATE 200 MG: 100 CAPSULE ORAL at 08:01

## 2025-01-27 RX ADMIN — IPRATROPIUM BROMIDE AND ALBUTEROL SULFATE 3 ML: 2.5; .5 SOLUTION RESPIRATORY (INHALATION) at 07:01

## 2025-01-27 RX ADMIN — BUDESONIDE INHALATION 0.5 MG: 0.5 SUSPENSION RESPIRATORY (INHALATION) at 07:01

## 2025-01-27 RX ADMIN — LEVETIRACETAM 1500 MG: 500 TABLET, FILM COATED ORAL at 08:01

## 2025-01-27 RX ADMIN — ALPRAZOLAM 0.25 MG: 0.25 TABLET ORAL at 09:01

## 2025-01-27 RX ADMIN — OXYCODONE HYDROCHLORIDE AND ACETAMINOPHEN 500 MG: 500 TABLET ORAL at 09:01

## 2025-01-27 RX ADMIN — SACUBITRIL AND VALSARTAN 1 TABLET: 24; 26 TABLET, FILM COATED ORAL at 10:01

## 2025-01-27 RX ADMIN — THERA TABS 1 TABLET: TAB at 08:01

## 2025-01-27 RX ADMIN — PREDNISONE 20 MG: 20 TABLET ORAL at 08:01

## 2025-01-27 RX ADMIN — METOPROLOL TARTRATE 25 MG: 25 TABLET, FILM COATED ORAL at 08:01

## 2025-01-27 RX ADMIN — ALPRAZOLAM 0.25 MG: 0.25 TABLET ORAL at 08:01

## 2025-01-27 RX ADMIN — METOPROLOL TARTRATE 25 MG: 25 TABLET, FILM COATED ORAL at 09:01

## 2025-01-27 RX ADMIN — IPRATROPIUM BROMIDE AND ALBUTEROL SULFATE 3 ML: 2.5; .5 SOLUTION RESPIRATORY (INHALATION) at 12:01

## 2025-01-27 RX ADMIN — BENZONATATE 200 MG: 100 CAPSULE ORAL at 01:01

## 2025-01-27 RX ADMIN — GUAIFENESIN AND DEXTROMETHORPHAN HYDROBROMIDE 2 TABLET: 600; 30 TABLET, EXTENDED RELEASE ORAL at 08:01

## 2025-01-27 RX ADMIN — GABAPENTIN 300 MG: 300 CAPSULE ORAL at 08:01

## 2025-01-27 RX ADMIN — ESCITALOPRAM OXALATE 20 MG: 10 TABLET, FILM COATED ORAL at 08:01

## 2025-01-27 RX ADMIN — PANTOPRAZOLE SODIUM 40 MG: 40 TABLET, DELAYED RELEASE ORAL at 09:01

## 2025-01-27 RX ADMIN — ALPRAZOLAM 0.25 MG: 0.25 TABLET ORAL at 02:01

## 2025-01-27 RX ADMIN — PROMETHAZINE HYDROCHLORIDE AND CODEINE PHOSPHATE 5 ML: 6.25; 1 SOLUTION ORAL at 02:01

## 2025-01-27 RX ADMIN — CLOPIDOGREL BISULFATE 75 MG: 75 TABLET ORAL at 08:01

## 2025-01-27 RX ADMIN — ROFLUMILAST 500 MCG: 500 TABLET ORAL at 08:01

## 2025-01-27 RX ADMIN — PANTOPRAZOLE SODIUM 40 MG: 40 TABLET, DELAYED RELEASE ORAL at 08:01

## 2025-01-27 RX ADMIN — BENZONATATE 200 MG: 100 CAPSULE ORAL at 09:01

## 2025-01-27 RX ADMIN — FUROSEMIDE 20 MG: 20 TABLET ORAL at 08:01

## 2025-01-27 RX ADMIN — PROMETHAZINE HYDROCHLORIDE AND CODEINE PHOSPHATE 5 ML: 6.25; 1 SOLUTION ORAL at 09:01

## 2025-01-27 RX ADMIN — LEVETIRACETAM 1500 MG: 500 TABLET, FILM COATED ORAL at 09:01

## 2025-01-27 RX ADMIN — SALINE NASAL SPRAY 1 SPRAY: 1.5 SOLUTION NASAL at 02:01

## 2025-01-27 RX ADMIN — GUAIFENESIN AND DEXTROMETHORPHAN HYDROBROMIDE 2 TABLET: 600; 30 TABLET, EXTENDED RELEASE ORAL at 09:01

## 2025-01-27 RX ADMIN — MONTELUKAST 10 MG: 10 TABLET, FILM COATED ORAL at 09:01

## 2025-01-27 RX ADMIN — OXYCODONE HYDROCHLORIDE AND ACETAMINOPHEN 500 MG: 500 TABLET ORAL at 08:01

## 2025-01-27 RX ADMIN — SALINE NASAL SPRAY 1 SPRAY: 1.5 SOLUTION NASAL at 09:01

## 2025-01-27 RX ADMIN — ATORVASTATIN CALCIUM 40 MG: 40 TABLET, FILM COATED ORAL at 08:01

## 2025-01-27 RX ADMIN — SALINE NASAL SPRAY 1 SPRAY: 1.5 SOLUTION NASAL at 08:01

## 2025-01-27 NOTE — PLAN OF CARE
Nutrition Recommendations/Interventions on 1/22/25:  1. Recommend pt continues on a Cardiac, low potassium diet  2. Recommend pt continues on Suplena TID to assist filling nutritional gaps   3. Encourage PO and supplement intake, recommend feeding assistance as warranted   4. Weigh twice weekly     Goals:   1. Diet will be modified to safest texture within 24 hrs if warranted (met)  2. Pt will tolerate and consume >75% EEN and EPN prior to RD follow up (progressing)   Nutrition Goal Status: active  Communication of RD Recs: other (comment) (POC, sticky note)    Mary Aguilar MS, RD, LDN

## 2025-01-27 NOTE — PLAN OF CARE
01/27/25 1558   Post-Acute Status   Post-Acute Authorization Placement  (SNF)   Post-Acute Placement Status Set-up Complete/Auth obtained   Coverage [employer]   Discharge Delays None known at this time   Discharge Plan   Discharge Plan A Skilled Nursing Facility       Per Mayuri, with [facility], they have insurance authorization for Patient to DC to them. PARTHA verbalized understanding and stated she would let providers know of DC tomorrow.    PARTHA will continue to follow and assist as needed.

## 2025-01-27 NOTE — PROGRESS NOTES
[facility] - Telemetry ([facility])  Adult Nutrition  Progress Note    SUMMARY       Recommendations    Recommendation/Intervention:   1. Recommend pt continues on a Cardiac, low potassium diet  2. Recommend pt continues on Suplena TID to assist filling nutritional gaps   3. Recommend adding Gt (provides 90 kcal, 2.5 g protein per serving)   4. Encourage PO and supplement intake, recommend feeding assistance as warranted  5. Weigh twice weekly    Goals:   1. Diet will be modified to safest texture within 24 hrs if warranted (met)  2. Pt will tolerate and consume >75% EEN and EPN prior to RD follow up (progressing)   Nutrition Goal Status: active  Communication of RD Recs: other (comment) (POC, sticky note)    Assessment and Plan    Nutrition Problem  Underweight  Inadequate energy intake   Decreased nutrient needs (protein)    Related to (etiology):   Increased demand for nutrition   Decreased ability to consume sufficient energy   CKD    Signs and Symptoms (as evidenced by):   BMI < 22 (older adult)  SOB, Estimated intake of food less than estimated needs   Current ONS exceeds protein needs     Interventions/Recommendations (treatment strategy):  1. Decreased sodium and fat, texture modified diet   2. Management of commercial beverage medical food supplement therapy  3. Feeding assistance management   4. Collaboration by nutrition professional with other providers    Nutrition Diagnosis Status:   Continues      Malnutrition Assessment  Malnutrition Level: other (see comments) (unable to determine)  Skin (Micronutrient): bruised (Gilberto score = 18 (mild risk)                               Reason for Assessment    Reason For Assessment: RD follow-up  Diagnosis:  (COVID-19)  General Information Comments:   1/9/25: Dietitian working remotely. Attempted to reach pt by phone without success. Currently on cardiac diet. Intake % of most meals documented in flowsheets. LBM 1/7. Weight recordings ranging from 108-122 lb  "this admit. No edema noted. Chart/review past records suggest weight gain over the last few months but weight loss in the first few months of 2024. Will need to clarify weight history with pt at f/u visit.     Follow-Up:   1/14/25: 79 y.o. Female admitted for COVID-19. PMH: GERD, UIP, CAD, Seizure, Normocytic anemia, HLD, Chronic systolic HF, CKD, Acute on chronic respiatory failure w/ hypoxia, Epistaxis; Hx: Stroke. Pt is currently on isolation and a Cardiac diet + Boost plus TID. RD follow up. EMR noted pt is doing better, pt still significantly SOB with exertion, pt denied hospice at this time, pt's goal is to "try to get better", pt negative for N/V/D/C. Spoke to RN via secure chat, confirmed pt is drinking Boost plus. Note Gt ordered but no wounds noted, RD d/c'd from pt's orders and trays, also modified ONS to Suplena to assist filling nutritional gaps but within protein needs for CKD. Per chart: 100% breakfast, 0% lunch PO intake today (1/14), LBM 1/12, GI WDL. Labs, meds, weights reviewed. Weight charted 1/7/25 112 lbs, 1/14/25 111 lbs (BMI 19.22, Underweight for age), wt stable. Unable to perform NFPE d/t pt's isolation status, will perform when appropriate. RD will continue to follow and monitor pt's nutritional status during admit.Nutrition Discharge Planning: Cardiac diet, texture per SLP recommendations + Boost plus as warranted    1/17/25: Dietitian working remotely. Per EMR flowsheets, pt ate 0% of her breakfast this morning however documented intakes have ranged from 0-100% this admit. Pt with 68% avg PO intake of meals x3 days.     1/22/25: Dietitian working remotely. Attempted to reach pt by phone without success. Currently on cardiac, low potassium diet. Variable intake % of meals since last RD assessment per flowsheet documentation. Receiving Suplena for additional kcal. Spoke with nursing via secure chat - reports pt has been eating 50-75% of meals and is drinking supplements. Last " "documented BM 1/18. Has been on MiraLax. Current weight suggests 11 lb weight gain since admit. No edema documented.    1/27/25: Dietitian working remotely. Discussed with RN via secure chat. Pt has been eating well today, 50-75% of meals consumed. Pt requesting to receive fruit punch Gt, orders updated. Suplena ordered however pt had not received any today, kitchen notified. No GI complaints noted. No edema present. Updated wt from 1/27 shows +11 lbs x1 month.     Nutrition/Diet History    Spiritual, Cultural Beliefs, Jew Practices, Values that Affect Care: no  Food Allergies: NKFA  Factors Affecting Nutritional Intake: other (see comments) (SOB)    Nutrition Related Social Determinants of Health: SDOH: Unable to assess at this time.     Anthropometrics    Height: 5' 4" (162.6 cm)  Height (inches): 64 in  Height Method: Stated  Weight: 54.3 kg (119 lb 11.4 oz)  Weight (lb): 119.71 lb  Weight Method: Bed Scale  Ideal Body Weight (IBW), Female: 120 lb  % Ideal Body Weight, Female (lb): 93.33 %  BMI (Calculated): 20.5  BMI Grade: 18.5-24.9 - normal (Underweight for age)       Wt Readings from Last 15 Encounters:   01/27/25 54.3 kg (119 lb 11.4 oz)   12/31/24 49.2 kg (108 lb 7.5 oz)   12/05/24 49.2 kg (108 lb 7.5 oz)   11/21/24 48.3 kg (106 lb 7.7 oz)   09/04/24 46.4 kg (102 lb 4.7 oz)   09/04/24 47.7 kg (105 lb 4.3 oz)   08/01/24 45.8 kg (101 lb)   07/17/24 46.5 kg (102 lb 8.2 oz)   05/27/24 48.1 kg (106 lb)   05/23/24 48.1 kg (106 lb 0.7 oz)   05/22/24 47.6 kg (104 lb 15 oz)   05/17/24 47.4 kg (104 lb 8 oz)   05/08/24 47.4 kg (104 lb 8 oz)   05/08/24 47.4 kg (104 lb 8 oz)   05/08/24 47.4 kg (104 lb 8 oz)     Lab/Procedures/Meds    Pertinent Labs Reviewed: reviewed  Pertinent Medications Reviewed: reviewed  Pertinent Medications Comments: 500 mg/day vitamin C, MTV, polyethylene glycol    BMP  Lab Results   Component Value Date     (L) 01/27/2025    K 4.1 01/27/2025     01/27/2025    CO2 22 (L) " "01/27/2025    BUN 52 (H) 01/27/2025    CREATININE 1.1 01/27/2025    CALCIUM 9.1 01/27/2025    ANIONGAP 9 01/27/2025    EGFRNORACEVR 51 (A) 01/27/2025     Lab Results   Component Value Date    CALCIUM 9.1 01/27/2025    PHOS 2.4 (L) 01/27/2025     Lab Results   Component Value Date    ALBUMIN 2.1 (L) 01/18/2025     Lab Results   Component Value Date    ALT 23 01/18/2025    AST 23 01/18/2025    ALKPHOS 44 01/18/2025    BILITOT 0.2 01/18/2025     No results for input(s): "POCTGLUCOSE" in the last 24 hours.    Lab Results   Component Value Date    HGBA1C 4.9 07/12/2024     Lab Results   Component Value Date    WBC 7.46 01/27/2025    HGB 9.5 (L) 01/27/2025    HCT 31.1 (L) 01/27/2025     (H) 01/27/2025     01/27/2025       Scheduled Meds:   albuterol-ipratropium  3 mL Nebulization Q6H    arformoteroL  15 mcg Nebulization BID    ascorbic acid (vitamin C)  500 mg Oral BID    atorvastatin  40 mg Oral Daily    benzonatate  200 mg Oral TID    budesonide  0.5 mg Nebulization Q12H    clopidogreL  75 mg Oral Daily    dextromethorphan-guaiFENesin  mg  2 tablet Oral BID    EScitalopram oxalate  20 mg Oral Daily    furosemide  20 mg Oral Daily    gabapentin  300 mg Oral BID    levETIRAcetam  1,500 mg Oral BID    metoprolol tartrate  25 mg Oral BID    montelukast  10 mg Oral Daily    multivitamin  1 tablet Oral Daily    pantoprazole  40 mg Oral BID    polyethylene glycol  17 g Oral Daily    predniSONE  20 mg Oral Daily    ranolazine  1,000 mg Oral BID    roflumilast  1 tablet Oral Daily    sacubitriL-valsartan  1 tablet Oral BID    sodium chloride  1 spray Each Nostril TID     Continuous Infusions:  PRN Meds:.  Current Facility-Administered Medications:     acetaminophen, 650 mg, Oral, Q6H PRN    ALPRAZolam, 0.25 mg, Oral, TID PRN    calcium carbonate, 500 mg, Oral, TID PRN    dextrose 50%, 12.5 g, Intravenous, PRN    dextrose 50%, 25 g, Intravenous, PRN    glucagon (human recombinant), 1 mg, Intramuscular, PRN    " glucose, 16 g, Oral, PRN    glucose, 24 g, Oral, PRN    ipratropium, 0.5 mg, Nebulization, Q4H PRN    magic mouthwash (diphenhydrAMINE 12.5 mg/5 mL 20 mL, aluminum & magnesium hydroxide-simethicone (MYLANTA) 20 mL, LIDOcaine HCl 2% (XYLOCAINE) 20 mL) solution, 10 mL, Swish & Spit, Q4H PRN    melatonin, 6 mg, Oral, Nightly PRN    methocarbamoL, 500 mg, Oral, TID PRN    morphine, 2 mg, Intravenous, Q4H PRN    ondansetron, 4 mg, Intravenous, Q8H PRN    promethazine-codeine 6.25-10 mg/5 ml, 5 mL, Oral, Q4H PRN    sodium chloride 0.9%, 10 mL, Intravenous, PRN      Estimated/Assessed Needs    Weight Used For Calorie Calculations: 54.2 kg (119 lb 7.8 oz)  Energy Calorie Requirements (kcal): 7885-7090 kcal (30-35 kcal/kg)  Energy Need Method: Kcal/kg  Protein Requirements: 40-50 g (0.8 g/kg) - GFR 38 on Lasix  Weight Used For Protein Calculations: 54.2 kg (119 lb 7.8 oz)  Fluid Requirements (mL): 1626 mL (30 mL/kg) or per MD  Estimated Fluid Requirement Method: RDA Method  RDA Method (mL): 1626  CHO Requirement: 203-237 g (50% estimated needs)      Nutrition Prescription Ordered    Current Diet Order: Cardiac, low potassium diet  Oral Nutrition Supplement: Suplena TID    Evaluation of Received Nutrient/Fluid Intake  I/O: (Net since admit):  1/14/25: -9250 mL  1/17/25: -3256 mL  1/27/25: +1820    Energy Calories Required: meeting needs  Protein Required: meeting needs  Fluid Required: not meeting needs  Total Fluid Intake (mL): 2020  Tolerance: tolerating  % Intake of Estimated Energy Needs: 50 - 75 %  % Meal Intake: %    Nutrition Risk    Level of Risk/Frequency of Follow-up: moderate     Monitor and Evaluation    Food and Nutrient Intake: energy intake, food and beverage intake  Food and Nutrient Adminstration: diet order  Knowledge/Beliefs/Attitudes: food and nutrition knowledge/skill, beliefs and attitudes  Anthropometric Measurements: weight, weight change, body mass index  Biochemical Data, Medical Tests and  Procedures: electrolyte and renal panel     Nutrition Follow-Up    RD Follow-up?: Yes  Mary Aguilar, MS, RD, LDN

## 2025-01-27 NOTE — PLAN OF CARE
01/27/25 0910   Post-Acute Status   Post-Acute Authorization Placement  (SNF)   Post-Acute Placement Status Pending payor review/awaiting authorization (if required)   Coverage [facility] Managed Medicare   Discharge Delays None known at this time   Discharge Plan   Discharge Plan A Skilled Nursing Facility       PARTHA contacted daughter, Trista, regarding SNF placement. PARTHA stated last we spoke, Patient's daughter was going to tour [facility] [place]. Per Patient's daughter, she did not have a chance to tour [facility], however she was planning to tour in the morning. PARTHA inquired if [facility] could submit for insurance authorization, for SNF placement. Patient's daughter was agreeable to [facility] submitting for authorization.  PARTHA reached out to Tavon, with [facility], requesting they submit for insurance authorization. Tavon stated they will submit to patient's [facility] today.    PARTHA will continue to follow and assist as needed.

## 2025-01-27 NOTE — PLAN OF CARE
01/27/25 1053   Rounds   Attendance Provider;;Charge nurse;Physical therapist   Discharge Plan A Skilled Nursing Facility   Why the patient remains in the hospital Other (see comment)  (Pending insurance authorization)   Transition of Care Barriers None       [facility], submitted for insurance authorization today.

## 2025-01-27 NOTE — PLAN OF CARE
Problem: Adult Inpatient Plan of Care  Goal: Plan of Care Review  Outcome: Progressing  Goal: Patient-Specific Goal (Individualized)  Outcome: Progressing  Goal: Absence of Hospital-Acquired Illness or Injury  Outcome: Progressing  Goal: Optimal Comfort and Wellbeing  Outcome: Progressing  Goal: Readiness for Transition of Care  Outcome: Progressing     Problem: Fall Injury Risk  Goal: Absence of Fall and Fall-Related Injury  Outcome: Progressing     Problem: Wound  Goal: Absence of Infection Signs and Symptoms  Outcome: Progressing     Problem: Wound  Goal: Optimal Pain Control and Function  Outcome: Progressing     Problem: Wound  Goal: Skin Health and Integrity  Outcome: Progressing     Problem: Wound  Goal: Optimal Wound Healing  Outcome: Progressing     Problem: Skin Injury Risk Increased  Goal: Skin Health and Integrity  Outcome: Progressing     Problem: Coping Ineffective  Goal: Effective Coping  Outcome: Progressing     Problem: Pneumonia  Goal: Resolution of Infection Signs and Symptoms  Outcome: Progressing     Problem: Pneumonia  Goal: Effective Oxygenation and Ventilation  Outcome: Progressing

## 2025-01-28 VITALS
TEMPERATURE: 99 F | OXYGEN SATURATION: 97 % | WEIGHT: 119.69 LBS | SYSTOLIC BLOOD PRESSURE: 101 MMHG | BODY MASS INDEX: 20.43 KG/M2 | DIASTOLIC BLOOD PRESSURE: 56 MMHG | RESPIRATION RATE: 18 BRPM | HEART RATE: 88 BPM | HEIGHT: 64 IN

## 2025-01-28 LAB
ANION GAP SERPL CALC-SCNC: 10 MMOL/L (ref 8–16)
ANISOCYTOSIS BLD QL SMEAR: SLIGHT
BASOPHILS NFR BLD: 0 % (ref 0–1.9)
BUN SERPL-MCNC: 58 MG/DL (ref 8–23)
CALCIUM SERPL-MCNC: 9.3 MG/DL (ref 8.7–10.5)
CHLORIDE SERPL-SCNC: 105 MMOL/L (ref 95–110)
CO2 SERPL-SCNC: 20 MMOL/L (ref 23–29)
CREAT SERPL-MCNC: 1.3 MG/DL (ref 0.5–1.4)
DACRYOCYTES BLD QL SMEAR: ABNORMAL
DIFFERENTIAL METHOD BLD: ABNORMAL
EOSINOPHIL NFR BLD: 3 % (ref 0–8)
ERYTHROCYTE [DISTWIDTH] IN BLOOD BY AUTOMATED COUNT: 15 % (ref 11.5–14.5)
EST. GFR  (NO RACE VARIABLE): 42 ML/MIN/1.73 M^2
GIANT PLATELETS BLD QL SMEAR: PRESENT
GLUCOSE SERPL-MCNC: 82 MG/DL (ref 70–110)
HCT VFR BLD AUTO: 31.1 % (ref 37–48.5)
HGB BLD-MCNC: 9.5 G/DL (ref 12–16)
IMM GRANULOCYTES # BLD AUTO: ABNORMAL K/UL (ref 0–0.04)
IMM GRANULOCYTES NFR BLD AUTO: ABNORMAL % (ref 0–0.5)
LYMPHOCYTES NFR BLD: 17 % (ref 18–48)
MAGNESIUM SERPL-MCNC: 1.6 MG/DL (ref 1.6–2.6)
MCH RBC QN AUTO: 30.3 PG (ref 27–31)
MCHC RBC AUTO-ENTMCNC: 30.5 G/DL (ref 32–36)
MCV RBC AUTO: 99 FL (ref 82–98)
METAMYELOCYTES NFR BLD MANUAL: 2 %
MONOCYTES NFR BLD: 8 % (ref 4–15)
MYELOCYTES NFR BLD MANUAL: 2 %
NEUTROPHILS NFR BLD: 66 % (ref 38–73)
NEUTS BAND NFR BLD MANUAL: 2 %
NRBC BLD-RTO: 0 /100 WBC
OVALOCYTES BLD QL SMEAR: ABNORMAL
PHOSPHATE SERPL-MCNC: 2.1 MG/DL (ref 2.7–4.5)
PLATELET # BLD AUTO: 361 K/UL (ref 150–450)
PLATELET BLD QL SMEAR: ABNORMAL
PMV BLD AUTO: 9.5 FL (ref 9.2–12.9)
POIKILOCYTOSIS BLD QL SMEAR: SLIGHT
POLYCHROMASIA BLD QL SMEAR: ABNORMAL
POTASSIUM SERPL-SCNC: 3.8 MMOL/L (ref 3.5–5.1)
RBC # BLD AUTO: 3.14 M/UL (ref 4–5.4)
SODIUM SERPL-SCNC: 135 MMOL/L (ref 136–145)
WBC # BLD AUTO: 7.84 K/UL (ref 3.9–12.7)

## 2025-01-28 PROCEDURE — 25000003 PHARM REV CODE 250: Performed by: INTERNAL MEDICINE

## 2025-01-28 PROCEDURE — 85027 COMPLETE CBC AUTOMATED: CPT | Performed by: STUDENT IN AN ORGANIZED HEALTH CARE EDUCATION/TRAINING PROGRAM

## 2025-01-28 PROCEDURE — 84100 ASSAY OF PHOSPHORUS: CPT | Performed by: STUDENT IN AN ORGANIZED HEALTH CARE EDUCATION/TRAINING PROGRAM

## 2025-01-28 PROCEDURE — 94640 AIRWAY INHALATION TREATMENT: CPT

## 2025-01-28 PROCEDURE — 63600175 PHARM REV CODE 636 W HCPCS: Performed by: INTERNAL MEDICINE

## 2025-01-28 PROCEDURE — 83735 ASSAY OF MAGNESIUM: CPT | Performed by: STUDENT IN AN ORGANIZED HEALTH CARE EDUCATION/TRAINING PROGRAM

## 2025-01-28 PROCEDURE — 85007 BL SMEAR W/DIFF WBC COUNT: CPT | Performed by: STUDENT IN AN ORGANIZED HEALTH CARE EDUCATION/TRAINING PROGRAM

## 2025-01-28 PROCEDURE — 25000003 PHARM REV CODE 250: Performed by: STUDENT IN AN ORGANIZED HEALTH CARE EDUCATION/TRAINING PROGRAM

## 2025-01-28 PROCEDURE — 25000242 PHARM REV CODE 250 ALT 637 W/ HCPCS: Performed by: STUDENT IN AN ORGANIZED HEALTH CARE EDUCATION/TRAINING PROGRAM

## 2025-01-28 PROCEDURE — 97530 THERAPEUTIC ACTIVITIES: CPT

## 2025-01-28 PROCEDURE — 97110 THERAPEUTIC EXERCISES: CPT

## 2025-01-28 PROCEDURE — 25000242 PHARM REV CODE 250 ALT 637 W/ HCPCS: Performed by: INTERNAL MEDICINE

## 2025-01-28 PROCEDURE — 97530 THERAPEUTIC ACTIVITIES: CPT | Mod: CQ

## 2025-01-28 PROCEDURE — 94761 N-INVAS EAR/PLS OXIMETRY MLT: CPT

## 2025-01-28 PROCEDURE — 25000003 PHARM REV CODE 250: Performed by: NURSE PRACTITIONER

## 2025-01-28 PROCEDURE — 80048 BASIC METABOLIC PNL TOTAL CA: CPT | Performed by: STUDENT IN AN ORGANIZED HEALTH CARE EDUCATION/TRAINING PROGRAM

## 2025-01-28 PROCEDURE — 97116 GAIT TRAINING THERAPY: CPT | Mod: CQ

## 2025-01-28 RX ORDER — SPIRONOLACTONE 50 MG/1
50 TABLET, FILM COATED ORAL DAILY
Start: 2025-01-28 | End: 2025-02-03 | Stop reason: SDUPTHER

## 2025-01-28 RX ORDER — OXYMETAZOLINE HCL 0.05 %
2 SPRAY, NON-AEROSOL (ML) NASAL 2 TIMES DAILY PRN
Qty: 22 ML | Refills: 0 | Status: SHIPPED | OUTPATIENT
Start: 2025-01-28 | End: 2025-01-28

## 2025-01-28 RX ORDER — PREDNISONE 5 MG/1
TABLET ORAL
Qty: 12 TABLET | Refills: 0 | Status: SHIPPED | OUTPATIENT
Start: 2025-01-28 | End: 2025-02-03

## 2025-01-28 RX ORDER — PROMETHAZINE HYDROCHLORIDE AND DEXTROMETHORPHAN HYDROBROMIDE 6.25; 15 MG/5ML; MG/5ML
5 SYRUP ORAL EVERY 8 HOURS PRN
Qty: 300 ML | Refills: 0 | Status: SHIPPED | OUTPATIENT
Start: 2025-01-28

## 2025-01-28 RX ORDER — BENZONATATE 200 MG/1
200 CAPSULE ORAL 3 TIMES DAILY PRN
Qty: 30 CAPSULE | Refills: 0 | Status: SHIPPED | OUTPATIENT
Start: 2025-01-28

## 2025-01-28 RX ORDER — SODIUM,POTASSIUM PHOSPHATES 280-250MG
2 POWDER IN PACKET (EA) ORAL
Status: COMPLETED | OUTPATIENT
Start: 2025-01-28 | End: 2025-01-28

## 2025-01-28 RX ORDER — GABAPENTIN 300 MG/1
300 CAPSULE ORAL 2 TIMES DAILY
Qty: 60 CAPSULE | Refills: 0 | Status: SHIPPED | OUTPATIENT
Start: 2025-01-28 | End: 2025-01-28

## 2025-01-28 RX ORDER — ROFLUMILAST 500 UG/1
1 TABLET ORAL DAILY
Start: 2025-01-28

## 2025-01-28 RX ORDER — OXYMETAZOLINE HCL 0.05 %
2 SPRAY, NON-AEROSOL (ML) NASAL 2 TIMES DAILY PRN
Qty: 22 ML | Refills: 0 | Status: SHIPPED | OUTPATIENT
Start: 2025-01-28 | End: 2025-01-31

## 2025-01-28 RX ORDER — PREDNISONE 5 MG/1
TABLET ORAL
Qty: 12 TABLET | Refills: 0 | Status: SHIPPED | OUTPATIENT
Start: 2025-01-28 | End: 2025-01-28

## 2025-01-28 RX ORDER — BENZONATATE 200 MG/1
200 CAPSULE ORAL 3 TIMES DAILY PRN
Qty: 30 CAPSULE | Refills: 0 | Status: SHIPPED | OUTPATIENT
Start: 2025-01-28 | End: 2025-01-28

## 2025-01-28 RX ORDER — GABAPENTIN 300 MG/1
300 CAPSULE ORAL 2 TIMES DAILY
Qty: 60 CAPSULE | Refills: 0 | Status: SHIPPED | OUTPATIENT
Start: 2025-01-28

## 2025-01-28 RX ORDER — ISOSORBIDE MONONITRATE 120 MG/1
120 TABLET, EXTENDED RELEASE ORAL NIGHTLY
Start: 2025-01-28 | End: 2025-02-03 | Stop reason: SDUPTHER

## 2025-01-28 RX ORDER — PROMETHAZINE HYDROCHLORIDE AND DEXTROMETHORPHAN HYDROBROMIDE 6.25; 15 MG/5ML; MG/5ML
5 SYRUP ORAL EVERY 4 HOURS PRN
Qty: 300 ML | Refills: 0 | Status: SHIPPED | OUTPATIENT
Start: 2025-01-28 | End: 2025-01-28

## 2025-01-28 RX ADMIN — LEVETIRACETAM 1500 MG: 500 TABLET, FILM COATED ORAL at 09:01

## 2025-01-28 RX ADMIN — SALINE NASAL SPRAY 1 SPRAY: 1.5 SOLUTION NASAL at 09:01

## 2025-01-28 RX ADMIN — BUDESONIDE INHALATION 0.5 MG: 0.5 SUSPENSION RESPIRATORY (INHALATION) at 07:01

## 2025-01-28 RX ADMIN — MONTELUKAST 10 MG: 10 TABLET, FILM COATED ORAL at 09:01

## 2025-01-28 RX ADMIN — FUROSEMIDE 20 MG: 20 TABLET ORAL at 09:01

## 2025-01-28 RX ADMIN — POTASSIUM & SODIUM PHOSPHATES POWDER PACK 280-160-250 MG 2 PACKET: 280-160-250 PACK at 02:01

## 2025-01-28 RX ADMIN — IPRATROPIUM BROMIDE AND ALBUTEROL SULFATE 3 ML: 2.5; .5 SOLUTION RESPIRATORY (INHALATION) at 07:01

## 2025-01-28 RX ADMIN — THERA TABS 1 TABLET: TAB at 09:01

## 2025-01-28 RX ADMIN — OXYCODONE HYDROCHLORIDE AND ACETAMINOPHEN 500 MG: 500 TABLET ORAL at 09:01

## 2025-01-28 RX ADMIN — CLOPIDOGREL BISULFATE 75 MG: 75 TABLET ORAL at 09:01

## 2025-01-28 RX ADMIN — BENZONATATE 200 MG: 100 CAPSULE ORAL at 02:01

## 2025-01-28 RX ADMIN — PROMETHAZINE HYDROCHLORIDE AND CODEINE PHOSPHATE 5 ML: 6.25; 1 SOLUTION ORAL at 04:01

## 2025-01-28 RX ADMIN — BENZONATATE 200 MG: 100 CAPSULE ORAL at 09:01

## 2025-01-28 RX ADMIN — IPRATROPIUM BROMIDE AND ALBUTEROL SULFATE 3 ML: 2.5; .5 SOLUTION RESPIRATORY (INHALATION) at 12:01

## 2025-01-28 RX ADMIN — ATORVASTATIN CALCIUM 40 MG: 40 TABLET, FILM COATED ORAL at 09:01

## 2025-01-28 RX ADMIN — ESCITALOPRAM OXALATE 20 MG: 10 TABLET, FILM COATED ORAL at 09:01

## 2025-01-28 RX ADMIN — PROMETHAZINE HYDROCHLORIDE AND CODEINE PHOSPHATE 5 ML: 6.25; 1 SOLUTION ORAL at 06:01

## 2025-01-28 RX ADMIN — ROFLUMILAST 500 MCG: 500 TABLET ORAL at 09:01

## 2025-01-28 RX ADMIN — POTASSIUM & SODIUM PHOSPHATES POWDER PACK 280-160-250 MG 2 PACKET: 280-160-250 PACK at 11:01

## 2025-01-28 RX ADMIN — METOPROLOL TARTRATE 25 MG: 25 TABLET, FILM COATED ORAL at 09:01

## 2025-01-28 RX ADMIN — SACUBITRIL AND VALSARTAN 1 TABLET: 24; 26 TABLET, FILM COATED ORAL at 09:01

## 2025-01-28 RX ADMIN — ARFORMOTEROL TARTRATE 15 MCG: 15 SOLUTION RESPIRATORY (INHALATION) at 07:01

## 2025-01-28 RX ADMIN — RANOLAZINE 1000 MG: 500 TABLET, EXTENDED RELEASE ORAL at 09:01

## 2025-01-28 RX ADMIN — GUAIFENESIN AND DEXTROMETHORPHAN HYDROBROMIDE 2 TABLET: 600; 30 TABLET, EXTENDED RELEASE ORAL at 09:01

## 2025-01-28 RX ADMIN — SALINE NASAL SPRAY 1 SPRAY: 1.5 SOLUTION NASAL at 02:01

## 2025-01-28 RX ADMIN — PREDNISONE 20 MG: 20 TABLET ORAL at 09:01

## 2025-01-28 RX ADMIN — PANTOPRAZOLE SODIUM 40 MG: 40 TABLET, DELAYED RELEASE ORAL at 09:01

## 2025-01-28 RX ADMIN — ALPRAZOLAM 0.25 MG: 0.25 TABLET ORAL at 09:01

## 2025-01-28 RX ADMIN — POTASSIUM & SODIUM PHOSPHATES POWDER PACK 280-160-250 MG 2 PACKET: 280-160-250 PACK at 01:01

## 2025-01-28 RX ADMIN — GABAPENTIN 300 MG: 300 CAPSULE ORAL at 09:01

## 2025-01-28 NOTE — PLAN OF CARE
[facility] - Telemetry (Hospital)  Discharge Final Note    Primary Care Provider: Laron Chaudhari MD    Expected Discharge Date: 1/28/2025    Final Discharge Note (most recent)       Final Note - 01/28/25 1557          Final Note    Assessment Type Final Discharge Note     Anticipated Discharge Disposition Home-Health Care [facility]     Hospital Resources/Appts/Education Provided Appointments scheduled and added to AVS        Post-Acute Status    Post-Acute Authorization Home Health;Placement     Post-Acute Placement Status Patient declined/refused     Home Health Status Set-up Complete/Auth obtained     Coverage [employer] Managed Medicare     Discharge Delays None known at this time                     Important Message from Medicare  Important Message from Medicare regarding Discharge Appeal Rights: Explained to patient/caregiver     Date IMM was signed: 01/28/25  Time IMM was signed: 1030    After-discharge care                Home Medical Care       [facility]   Service: Home Health Services    [address] [place] [id number]   Phone: [phone]                     DC Disposition: [facility]  Family Notified: Patient and daughter and bedside  Transportation: personal transportation    Patient needed Home Health services set up upon discharge. Patient has [facility] set up and information has been added to Patient's AVS.    Patient and daughter agreeable to [facility] upon DC.    Patient has PCP hospital follow up with Laron Chaudhari MD, on 1/31/25 at 10:40 am.

## 2025-01-28 NOTE — ASSESSMENT & PLAN NOTE
Anemia is likely due to chronic disease due to Chronic Kidney Disease. Most recent hemoglobin and hematocrit are listed below.  Recent Labs     01/25/25  0501 01/26/25  0513 01/27/25  0620   HGB 9.0* 9.9* 9.5*   HCT 29.3* 32.9* 31.1*       Plan  - Monitor serial CBC: Daily  - Transfuse PRBC if patient becomes hemodynamically unstable, symptomatic or H/H drops below 7/21.  - Patient has not received any PRBC transfusions to date  - Patient's anemia is currently stable

## 2025-01-28 NOTE — PROGRESS NOTES
[facility] Hospital Medicine  Progress Note    Patient Name: Shireen Felix  MRN: [id number]  Patient Class: IP- Inpatient   Admission Date: 12/31/2024  Length of Stay: 26 days  Attending Physician: Jalen Leal DO  Primary Care Provider: Laron Chaudhari MD        Subjective     Principal Problem:UIP (usual interstitial pneumonitis)        HPI:  Shireen Felix is a 79 year old female who has  has a past medical history of Abnormal ECG, Acute bronchitis and bronchiolitis, Aneurysm, Anticoagulant long-term use, Arthritis, CAD in native artery, COPD (chronic obstructive pulmonary disease), Diabetes mellitus, Fall, Glaucoma, Hypertension, Seizures, Shingles, and Stroke who presented to Emergency Department for evaluation for cough. Associated symptoms include: congestion, wheezing, and SOB. She was seen by her pulmonologist today, Dr. Sam. Started as sinus pain and drainage and now has progressed to respiratory symptoms. She was seen a few days ago in the ER given prescription for antibiotics steroids but these have not helped much. Today in the office she can barely talk without coughing continually. She is having a difficult time putting more than 3 words together without severe cough paroxysms. ED workup showed Hgb/Hct 11.9/37.0, K+ 3.1, CO2 16, BUN/creatinine 34/1.5. CT chest w/out contrast stable suspected pulmonary fibrosis of the lungs. No acute infiltrates. She has received DuoNeb x 1, solumedrol 125 mg IV x 1, KCL 40 mEq PO x 1 and NS 1L bolus. Pt admitted for COVID.     Overview/Hospital Course:  Admitted to Hospital Medicine for acute on chronic respiratory failure concerns in setting of COVID-19.  Started on IV methylprednisolone, remdesivir, scheduled respiratory treatments. 01/04: Reported left lower quadrant abdominal pain with tenderness to palpation, will evaluate via CT as patient is on therapeutic anticoagulation for COVID-19.  CT abdomen/pelvis nonacute.  01/05:  Reported improvements in respiratory status with decreased frequency of cough and dyspnea but still reporting significant symptoms from baseline.  On 1/6, patient had a significant event with acute tachypnea, tachycardia. Pulmonology consulted, orders adjusted. Patient stable overnight, had another event on 1/7 similar to previous day. Possible exacerbation related to anxiety provoked by medications and congestion. Case reviewed, orders adjusted. Per Pulmonology, due to underlying lung fibrosis, patient is likely not benefiting from bronchodilators. Stopped on 1/7, patient did not experience any further episodes of anxiety, respiratory distress. Will continue xanax and prn morphine. Patient with epistaxis, small clots in tissue noted. Reduced supplemental O2 to 1L at rest, stopped full dose lovenox and placed on ppx dose. Hgb stable.   Difficulty with discharge plan, patient originally planned to d/c to SNF, now prefers to d/c home.  Pulmonology discussed home palliative care vs hospice.  Patient and family wish to discuss the Services further.  Per discussion with patient, family will be available for an informational visit on Monday.  Educated patient on results of oxygen evaluation, advised patient to reduce oxygen to 2 L or less at rest, requires 4 L with exertion.  As clinically indicated if significant improvement noted will repeat oxygen evaluation.     01/12/2025  O2 sats stable on 2L (while at rest). Patient and family wants to try and go home, but unable to participate with PT over weekend. Repeat desat study on Monday. May have to consider palliative measures if progress plateaus.      01/13/2025  Feeling slightly better today than she felt a few days ago but overall still significantly short of breath with exertion.  Discussed with her, palliative Care, and her family.  She does not want to change to hospice at this time.  She wants to try to get better.  She does want to be DNR though.   Appreciate further palliative care involvement.  Still desaturating significantly with exertion and unable to work with PT because of this    01/14/2025  Feeling perhaps slightly better today, attempting to work with physical therapy.  We are hoping that we could either get her to rehab if her oxygen requirements with exertion go down, or potentially get a bigger concentrator at home for increasing her up to 10 L with exertion at home.  Otherwise no changes to the plan    01/15/2025:  Still having coughing spells but notably improved.  Oxygen is being down titrated and she even got a room air today.  We are screening her for rehabs    01/16/2025:  Coughing she states is much worse when oxygen is off.  She was on room air but I put her on 1 L to see if there is truly an effect.  She is still being screened for rehabs.  Overall she looks much better.  She did technically have a fever today to 101.4 but when I evaluate her she looks notably better, CXR is negative for acute change, would not recommend starting antibiotics and she has defervesced    1/17/25: Somnolent this AM, spiking more fevers yesterday evening, and mildly soft BP this AM. Lactate wnl. Alert and oriented on my eval. CXR with ? Infiltrate, more cough, feeling worse. Will start ABX for PNA, blood cultures, sputum culture    01/18/2025: Significant epistaxis and hemoptysis overnight.  CT with and without contrast did not reveal a source of bleed.  Still coughing with hemoptysis this morning.  On exam with obvious epistaxis, packed by ENT, resolved.  Coughing has improved.  Currently on Ventimask to avoid nasal cannula     01/19/2025: Doing better today, no more hemoptysis, less cough, no more epistaxis, hemoglobin up slightly.  Still on Ventimask to avoid nasal cannula but only requiring minimal quantities of oxygen, can take it off to eat.  Listed precautions yesterday evening for COVID given has been more than 10 days since diagnosis    1/20/25:  reporting abdominal pain when coughing. States she reported yesterday as well.     1/21/25 patient reports she is feeling better. Abdominal pain with movement. Audible wheezing noted. Duonebs added    1/22/25: adjusted medications for cough. Awaiting placement    1/23/25: patient with persistent cough. Chest x-ray stable. Changed Mucinex to Mucinex DM. Continue steroids, slow taper    1/24/25: patient asking about home oxygen. Currently using venti mask for comfort. Home oxygen eval ordered again. Awaiting placement.    1/25/25: patient no longer requires oxygen. Cough is improving. Awaiting placement    1/26/25: Patient feeling better, cough present but better than earlier in the week. Awaiting placement stable on room air.     1/27/25:  Continued to report improvement in respiratory symptoms, some concerns for recurrence of epistaxis.  Will discuss with ENT    Interval History: Continues to report improvement in respiratory symptoms, but noted some concerns for recurrence of epistaxis. Will discuss with ENT     Objective:     Vital Signs (Most Recent):  Temp: 98.6 °F (37 °C) (01/27/25 1634)  Pulse: 92 (01/27/25 1634)  Resp: 16 (01/27/25 1634)  BP: (!) 99/54 (01/27/25 1634)  SpO2: 96 % (01/27/25 1634) Vital Signs (24h Range):  Temp:  [97.2 °F (36.2 °C)-98.6 °F (37 °C)] 98.6 °F (37 °C)  Pulse:  [] 92  Resp:  [16-24] 16  SpO2:  [93 %-100 %] 96 %  BP: ()/(50-67) 99/54     Weight: 54.3 kg (119 lb 11.4 oz)  Body mass index is 20.55 kg/m².    Intake/Output Summary (Last 24 hours) at 1/27/2025 1753  Last data filed at 1/27/2025 1325  Gross per 24 hour   Intake 1060 ml   Output --   Net 1060 ml         Physical Exam  Vitals and nursing note reviewed.   Constitutional:       General: She is not in acute distress.     Appearance: She is not ill-appearing, toxic-appearing or diaphoretic.   HENT:      Head: Normocephalic and atraumatic.      Mouth/Throat:      Mouth: Mucous membranes are moist.   Eyes:       General: No scleral icterus.        Right eye: No discharge.         Left eye: No discharge.   Cardiovascular:      Rate and Rhythm: Normal rate and regular rhythm.      Heart sounds: Normal heart sounds.   Pulmonary:      Effort: Pulmonary effort is normal. No respiratory distress.      Breath sounds: Decreased breath sounds and rhonchi present. No wheezing or rales.   Abdominal:      General: Bowel sounds are normal. There is no distension.      Palpations: Abdomen is soft.      Tenderness: There is no abdominal tenderness. There is no guarding or rebound.   Musculoskeletal:      Cervical back: No rigidity.      Right lower leg: No edema.      Left lower leg: No edema.   Skin:     General: Skin is warm and dry.      Coloration: Skin is not jaundiced.   Neurological:      Mental Status: She is alert and oriented to person, place, and time. Mental status is at baseline.   Psychiatric:         Mood and Affect: Mood normal.         Behavior: Behavior normal.           Significant Labs: All pertinent labs within the past 24 hours have been reviewed.   Recent Labs   Lab 01/25/25  0501 01/26/25  0513 01/27/25  0620    137 135*   K 4.1 4.0 4.1    104 104   CO2 21* 20* 22*   BUN 45* 50* 52*   CREATININE 1.2 1.1 1.1   GLU 98 88 88   ANIONGAP 10 13 9     Recent Labs   Lab 01/25/25  0501 01/26/25  0513 01/27/25  0620   MG 1.8 1.7 1.6   PHOS 2.1* 2.3* 2.4*    Recent Labs   Lab 01/25/25  0501 01/26/25  0513 01/27/25  0620   WBC 6.36 7.43 7.46   HGB 9.0* 9.9* 9.5*   HCT 29.3* 32.9* 31.1*    353 292   GRAN 55.0 60.0 59.0          Microbiology  Blood Cultures  Lab Results   Component Value Date    LABBLOO No growth after 5 days. 01/17/2025    LABBLOO No growth after 5 days. 01/17/2025           Significant Imaging:  I have reviewed all pertinent imaging results/findings within the past 24 hours.   X-Ray Chest 1 View   Final Result      No acute abnormality.         Electronically signed by: Shane Jones    Date:    01/21/2025   Time:    13:32      CT Chest W Wo Contrast   Final Result      Moderate bilateral interstitial pulmonary fibrosis.  No acute infiltrates.  The airways appear unremarkable.      No evidence of pulmonary embolism         Electronically signed by: Tommy Maravilla MD   Date:    01/18/2025   Time:    08:02      X-Ray Chest AP Portable   Final Result      Chronic interstitial change slight increased hazy density over the left lung without large consolidation.  Acute infiltrate possible..         Electronically signed by: Margot Reyes   Date:    01/17/2025   Time:    09:16      X-Ray Chest AP Portable   Final Result      Chronic lung disease.  No acute finding.         Electronically signed by: Liborio Johnson   Date:    01/16/2025   Time:    10:13      CT Head Without Contrast   Final Result      No acute or adverse intracranial finding         Electronically signed by: Cyn Thacker   Date:    01/15/2025   Time:    23:04      CTA Chest Non-Coronary (PE Studies)   Final Result      No evidence of pulmonary embolism.      Suspect chronic interstitial lung disease similar to prior.      See additional findings above      All CT scans at this facility use dose modulation, iterative reconstruction and/or weight based dosing when appropriate to reduce radiation dose to as low as reasonably achievable.         Electronically signed by: Brody Willis MD   Date:    01/07/2025   Time:    12:29      US Upper Extremity Arteries Right   Final Result    No occlusion or severe stenosis identified.  Further evaluation as needed.      Finalized on: 1/6/2025 8:19 PM By:  Shane Jones MD   [facility]# [id number]      2025-01-06 20:21:27.027    [facility]      US Upper Extremity Veins Right   Final Result    Normal right upper extremity venous ultrasound without DVT identified.      Finalized on: 1/6/2025 8:18 PM By:  Shane Jones MD   [facility]# [id number]      2025-01-06 20:20:06.966    [facility]      X-Ray Chest 1 View   Final  Result    As above.      Finalized on: 1/6/2025 7:34 PM By:  Shane Jones MD   [facility]# [id number]      2025-01-06 19:36:04.604    [facility]      CT Abdomen Pelvis  Without Contrast   Final Result      No acute process..  Simple cyst of the bilateral kidneys.  Atherosclerotic disease.  Bilateral hip replacement.  Endplate deformities.  Remaining findings as above.      All CT scans   are performed using dose optimization techniques including the following: automated exposure control; adjustment of the mA and/or kV; use of iterative reconstruction technique.  Dose modulation was employed for ALARA by means of: Automated exposure control; adjustment of the mA and/or kV according to patient size (this includes techniques or standardized protocols for targeted exams where dose is matched to indication/reason for exam; i.e. extremities or head); and/or use of iterative reconstructive technique.         Electronically signed by: Roland Ken   Date:    01/05/2025   Time:    00:03      X-Ray Chest AP Portable   Final Result      No acute abnormality.         Electronically signed by: Roland Ken   Date:    01/03/2025   Time:    20:43      CT Head Without Contrast   Final Result      No acute abnormality.      Prior aneurysm clipping.      All CT scans at this facility are performed  using dose modulation techniques as appropriate to performed exam including the following:  automated exposure control; adjustment of mA and/or kV according to the patients size (this includes techniques or standardized protocols for targeted exams where dose is matched to indication/reason for exam: i.e. extremities or head);  iterative reconstruction technique.         Electronically signed by: Shane Jones   Date:    01/01/2025   Time:    20:27      CT Chest Without Contrast   Final Result      Stable suspected pulmonary fibrosis of the lungs.  No acute infiltrates.         Electronically signed by: Tommy Maravilla MD   Date:    12/31/2024    Time:    13:59          Inpatient Medications:  Continuous Infusions:    Scheduled Meds:   albuterol-ipratropium  3 mL Nebulization Q6H    arformoteroL  15 mcg Nebulization BID    ascorbic acid (vitamin C)  500 mg Oral BID    atorvastatin  40 mg Oral Daily    benzonatate  200 mg Oral TID    budesonide  0.5 mg Nebulization Q12H    clopidogreL  75 mg Oral Daily    dextromethorphan-guaiFENesin  mg  2 tablet Oral BID    EScitalopram oxalate  20 mg Oral Daily    furosemide  20 mg Oral Daily    gabapentin  300 mg Oral BID    levETIRAcetam  1,500 mg Oral BID    metoprolol tartrate  25 mg Oral BID    montelukast  10 mg Oral Daily    multivitamin  1 tablet Oral Daily    pantoprazole  40 mg Oral BID    polyethylene glycol  17 g Oral Daily    predniSONE  20 mg Oral Daily    ranolazine  1,000 mg Oral BID    roflumilast  1 tablet Oral Daily    sacubitriL-valsartan  1 tablet Oral BID    sodium chloride  1 spray Each Nostril TID       PRN Meds:  Current Facility-Administered Medications:     acetaminophen, 650 mg, Oral, Q6H PRN    ALPRAZolam, 0.25 mg, Oral, TID PRN    calcium carbonate, 500 mg, Oral, TID PRN    dextrose 50%, 12.5 g, Intravenous, PRN    dextrose 50%, 25 g, Intravenous, PRN    glucagon (human recombinant), 1 mg, Intramuscular, PRN    glucose, 16 g, Oral, PRN    glucose, 24 g, Oral, PRN    ipratropium, 0.5 mg, Nebulization, Q4H PRN    magic mouthwash (diphenhydrAMINE 12.5 mg/5 mL 20 mL, aluminum & magnesium hydroxide-simethicone (MYLANTA) 20 mL, LIDOcaine HCl 2% (XYLOCAINE) 20 mL) solution, 10 mL, Swish & Spit, Q4H PRN    melatonin, 6 mg, Oral, Nightly PRN    methocarbamoL, 500 mg, Oral, TID PRN    morphine, 2 mg, Intravenous, Q4H PRN    ondansetron, 4 mg, Intravenous, Q8H PRN    promethazine-codeine 6.25-10 mg/5 ml, 5 mL, Oral, Q4H PRN    sodium chloride 0.9%, 10 mL, Intravenous, PRN          Assessment and Plan     * UIP (usual interstitial pneumonitis)  Pulm fibrosis hx- followed by pulmonology  outpatient  Likely had flare of pulmonary fibrosis secondary to the COVID infection  Weaned to room air  Continue steroids, slow taper      Hyperkalemia  Hyperkalemia is likely due to  metabolic acidosis .The patients most recent potassium results are listed below.  Recent Labs     01/25/25  0501 01/26/25  0513 01/27/25  0620   K 4.1 4.0 4.1       Plan  - Monitor for arrhythmias with EKG and/or continuous telemetry.   - - Monitor potassium: Daily  - The patient's hyperkalemia is resolved            Atrial fibrillation  Patient with paroxysmal (<7 days) atrial fibrillation which is {:controlled currently without medication. Patient is currently in sinus rhythm.[id number] Score: 5. HASBLED Score: . Anticoagulation Patient is not anticoagulated due to bleeding risk .           Nasal septal deviation  Evaluated by ENT this admission      Hemoptysis  Likely secondary to the epistaxis.  CT with and without contrast negative.  Was temporarily on TXA neb. Improved significantly after addressing the epistaxis 01/18/2025      Hospital acquired PNA  -completed course of cefepime    Encounter for palliative care  Palliative Care is following.  Receiving hydrocodone/acetaminophen p.r.n., morphine p.r.n., alprazolam p.r.n. for respiratory distress with good effect      Epistaxis  Likely secondary to supplemental O2, septal defect.  ENT packed the nose 1/18/25 with some concerns for recurrence on 01/27/24(see tissue paper images of nasal output)      -Mucomyst nasal spray to keep the septum moist  -Will discuss with ENT    Acute on chronic respiratory failure with hypoxia  Patient with Hypoxic Respiratory failure which is Acute on chronic.  she is not on home oxygen. Supplemental oxygen was provided and noted-      .   Signs/symptoms of respiratory failure include- tachypnea, increased work of breathing, respiratory distress, use of accessory muscles, wheezing, stridor, and lethargy. Contributing diagnoses includes - Interstitial  lung disease Labs and images were reviewed. Patient Has recent ABG, which has been reviewed. Will treat underlying causes and adjust management of respiratory failure as follows-   -did not qualify for oxygen, currently stable on room air  -resolved  -continue steroids, slow taper                           CKD (chronic kidney disease)  Creatine stable for now. BMP reviewed- noted Estimated Creatinine Clearance: 35 mL/min (based on SCr of 1.1 mg/dL). according to latest data. Based on current GFR, CKD stage is stage 4 - GFR 15-29.  Monitor UOP and serial BMP and adjust therapy as needed. Renally dose meds. Avoid nephrotoxic medications and procedures.  -Creatinine 1.5 on admission however 3 months ago was 1.2    Recent Labs     01/25/25  0501 01/26/25  0513 01/27/25  0620   CREATININE 1.2 1.1 1.1             Chronic systolic heart failure  Patient has Diastolic (HFpEF) heart failure that is Chronic. On presentation their CHF was well compensated.     Latest ECHO  Results for orders placed during the hospital encounter of 06/02/23    Echo    Interpretation Summary  · The left ventricle is normal in size with concentric hypertrophy and low normal systolic function.  · The estimated ejection fraction is 50%.  · Normal left ventricular diastolic function.  · There is abnormal septal wall motion consistent with left bundle branch block.  · Normal right ventricular size with normal right ventricular systolic function.  · Normal central venous pressure (3 mmHg).  · The estimated PA systolic pressure is 30 mmHg.  · Mild mitral regurgitation.    Current Heart Failure Medications  furosemide tablet 20 mg, Daily, Oral  sacubitriL-valsartan 24-26 mg per tablet 1 tablet, 2 times daily, Oral    Plan  - Monitor strict I&Os and daily weights.    - Place on telemetry  - Low sodium diet  - Place on fluid restriction of 1.5 L.   - Cardiology has not been consulted  - The patient's volume status is at their baseline        COVID-19  **  initially COVID positive on 12/31 on admission **   Patient is identified as Severe COVID-19 based on hypoxemia with O2 saturations <94% on room air or on ambulation   Initiated standard COVID protocols; COVID-19 testing ,Infection Control notification  and isolation- respiratory, contact and droplet per protocol    Management: Treated with targetedtherapy with Remdesivir, 200mg IV x1, followed by 100mg IV daily x5 days total and Anticoagulation, Patient admitted to non-critical care unit- Will initiate full dose anticoagulation with Weight based lovenox 1mg/kg IV q12, stopped on 1/11/25, Maintain oxygen saturations 92-96% via Nasal Cannula  and monitor with continuous/intermittent pulse oximetry. , and Continuous cardiac monitoring.    As of 01/18/2025 DC'd  precautions as it has been more than 10 days since onset, and her persistent symptoms are likely a flare of her interstitial lung disease/postviral pneumonia     Mixed hyperlipidemia  Lab Results   Component Value Date    CHOL 112 (L) 07/12/2024    HDL 41 07/12/2024    LDLCALC 49.8 (L) 07/12/2024    TRIG 106 07/12/2024     -Continue Statin    Fall  Fall night of 1/15 with head strike   Evaluated by nighttime hospitalist   Head CT was negative   Neuro exam on 01/16 is normal   Fall precautions  Continue therapy      Normocytic anemia  Anemia is likely due to chronic disease due to Chronic Kidney Disease. Most recent hemoglobin and hematocrit are listed below.  Recent Labs     01/25/25  0501 01/26/25  0513 01/27/25  0620   HGB 9.0* 9.9* 9.5*   HCT 29.3* 32.9* 31.1*       Plan  - Monitor serial CBC: Daily  - Transfuse PRBC if patient becomes hemodynamically unstable, symptomatic or H/H drops below 7/21.  - Patient has not received any PRBC transfusions to date  - Patient's anemia is currently stable      Seizure  Hx of seizures, patient has not had a seizure during this admission.   At home is on seizure medication Aptiom 400mg daily and Keppra 1,000mg BID  Aptiom  likely worsening hyponatremia and also inhibiting SNF placement due to cost, so discussed with neuro on call  Per neuro on call, would DC Aptiom and increase Keppra to 1,500mg BID, change made  Can follow up with neuro as outpatient to revisit        Coronary artery disease of native artery of native heart with stable angina pectoris  Patient with known CAD  No anginal signs or symptoms   On Plavix and statin as an outpatient   Resume Plavix      COPD exacerbation  Scheduled Duonebs  Continue oral and inhaled steroids  LABA    GERD (gastroesophageal reflux disease)  -Continue PPI      Nasal septal perforation  Was likely contributing to the epistaxis   Seen by ENT   Packing in place   No further bleeding   Mucomyst nasal spray to the septal defect      VTE Risk Mitigation (From admission, onward)      None            Discharge Planning   KATHERINE:      Code Status: DNR   Medical Readiness for Discharge Date:   Discharge Plan A: Skilled Nursing Facility   Discharge Delays: None known at this time            Please place Justification for DME        Jalen Leal DO  Department of Hospital Medicine   [facility] Telemetry ([facility])    Voice recognition software was used in the creation of this note/communication and any sound-alike/typographical errors which may have occurred despite initial review prior to signing should be taken in context when interpreting.  If such errors prevent a clear understanding of the note/communication, please contact the provider/office for clarification.

## 2025-01-28 NOTE — DISCHARGE INSTRUCTIONS
-You were admitted to our hospital for treatment of hypoxic respiratory failure. Over the course of your hospitalization, our pulmonology team also evaluated you.    Our ENT team also evaluated you for concerns for nosebleeds, and they would like to follow up with you outpatient.We have placed a referral to ENT.  Please give our scheduling line a call at [phone] to schedule an appointment with them if it has not been setup already.      -Please read the attached information regarding COVID-19, interstitial pneumonia and go to your nearest ED if any of the alarm/concerning signs develop.    -We have placed a referral to pulmonology.  Please give our scheduling line a call at [phone] to schedule an appointment with them if it has not been setup already.    -Your labs remained stable, but some labs still remained abnormal. As we discussed, it is extremely important for you to followup with a primary care physician within 1 week for repeat lab work to ensure normalization, follow up of pending labs, medication adjustments/refills, routine post-discharge care, and any other evaluations as necessitated.

## 2025-01-28 NOTE — ASSESSMENT & PLAN NOTE
Patient with Hypoxic Respiratory failure which is Acute on chronic.  she is not on home oxygen. Supplemental oxygen was provided and noted-      .   Signs/symptoms of respiratory failure include- tachypnea, increased work of breathing, respiratory distress, use of accessory muscles, wheezing, stridor, and lethargy. Contributing diagnoses includes - Interstitial lung disease Labs and images were reviewed. Patient Has recent ABG, which has been reviewed. Will treat underlying causes and adjust management of respiratory failure as follows-   -did not qualify for oxygen, currently stable on room air  -resolved  -continue steroids, slow taper

## 2025-01-28 NOTE — ASSESSMENT & PLAN NOTE
Patient with paroxysmal (<7 days) atrial fibrillation which is {:controlled currently without medication. Patient is currently in sinus rhythm.[id number] Score: 5. HASBLED Score: . Anticoagulation Patient is not anticoagulated due to bleeding risk .

## 2025-01-28 NOTE — PT/OT/SLP PROGRESS
Physical Therapy  Treatment    Shireen Felix   MRN: [id number]   Admitting Diagnosis: UIP (usual interstitial pneumonitis)    PT Received On: 01/28/25  PT Start Time: 1010     PT Stop Time: 1035    PT Total Time (min): 25 min       Billable Minutes:  Gait Training 10 and Therapeutic Activity 15    Treatment Type: Treatment  PT/PTA: PTA     Number of PTA visits since last PT visit: 3       General Precautions: Standard, fall  Orthopedic Precautions: N/A  Braces: N/A  Respiratory Status: Room air    Spiritual, Cultural Beliefs, Episcopal Practices, Values that Affect Care: no    Subjective:  Communicated with patient's nurse and completed Epic chart review prior to session.  Patient agreed to PT session.     Pain/Comfort  Pain Rating 1: 0/10    Objective:   Patient found with: telemetry, Other (comments) (MIDLINE; AVASYS)    Supine > sit EOB: SBA    Forward scoot towards EOB: SBA    STS from EOB > RW: CGA (VC for hand placement)    50ft w/ RW CGA    Stand pivot T/F to chair w/ RW: CGA    Reviewed AROM TE to BLE including: hip flex/ext, knee flex/ext, ankle PF/DF  To be completed a minimum of 10 reps for each LE in order to promote return of function, strength and ROM.     Educated patient on importance of increased tolerance to upright position and direct impact on CV endurance and strength. Patient encouraged to sit up in chair/ EOB, for a minimum of 2 consecutive hours, 3x per day. Encouraged patient to perform AROM TE to BLE throughout the day within all available planes of motion. Re enforced importance of utilizing call light to meet needs in room and not attempt to get up without staff assistance. Patient verbalized understanding and agreed to comply.     Encouraged patient/family to request mobility tech or nursing staff assistance in order to ambulate additional 2-3 times more throughout the day as tolerated.     AM-PAC 6 CLICK MOBILITY  How much help from another person does this patient currently need?    1 = Unable, Total/Dependent Assistance  2 = A lot, Maximum/Moderate Assistance  3 = A little, Minimum/Contact Guard/Supervision  4 = None, Modified [place]/Independent    Turning over in bed (including adjusting bedclothes, sheets and blankets)?: 3  Sitting down on and standing up from a chair with arms (e.g., wheelchair, bedside commode, etc.): 3  Moving from lying on back to sitting on the side of the bed?: 3  Moving to and from a bed to a chair (including a wheelchair)?: 3  Need to walk in hospital room?: 3  Climbing 3-5 steps with a railing?: 1 (NT)  Basic Mobility Total Score: 16    AM-PAC Raw Score CMS G-Code Modifier Level of Impairment Assistance   6 % Total / Unable   7 - 9 CM 80 - 100% Maximal Assist   10 - 14 CL 60 - 80% Moderate Assist   15 - 19 CK 40 - 60% Moderate Assist   20 - 22 CJ 20 - 40% Minimal Assist   23 CI 1-20% SBA / CGA   24 CH 0% Independent/ Mod I     Patient left up in chair with call button in reach, chair alarm on, and daughter & AVASYS present.    Assessment:  Shireen Felix is a 79 y.o. female with a medical diagnosis of UIP (usual interstitial pneumonitis) and presents with overall decline in functional mobility. Patient would continue to benefit from skilled PT to address functional limitations listed below in order to return to PLOF/decrease caregiver burden.     Rehab identified problem list/impairments: weakness, impaired endurance, gait instability, impaired balance, decreased safety awareness, decreased lower extremity function, decreased upper extremity function, decreased ROM, impaired cardiopulmonary response to activity    Rehab potential is good.    Activity tolerance: Fair    Discharge recommendations: Moderate Intensity Therapy      Barriers to discharge:      Equipment recommendations: walker, rolling     GOALS:   Multidisciplinary Problems       Physical Therapy Goals          Problem: Physical Therapy    Goal Priority Disciplines Outcome  Interventions   Physical Therapy Goal     PT, PT/OT Progressing    Description: Goals to be met by 1/31/25.  1. Pt will complete bed mobility S.  2. Pt will complete sit to stand MIN A .  3. Pt will ambulate 50Ft WITH MIN A using RW.  4. Pt will increase AMPAC score by 2 points to progress functional mobility.                       DME Justifications:   Shireen's mobility limitation cannot be sufficiently resolved by the use of a cane. Her functional mobility deficit can be sufficiently resolved with the use of a Rolling Walker. Patient's mobility limitation significantly impairs their ability to participate in one of more activities of daily living.  The use of a RW will significantly improve the patient's ability to participate in MRADLS and the patient will use it on regular basis in the home.    PLAN:    Patient to be seen 3 x/week to address the above listed problems via gait training, therapeutic activities, therapeutic exercises  Plan of Care expires: 01/31/25  Plan of Care reviewed with: patient, daughter         01/28/2025

## 2025-01-28 NOTE — PLAN OF CARE
Problem: Adult Inpatient Plan of Care  Goal: Plan of Care Review  Outcome: Progressing  Goal: Patient-Specific Goal (Individualized)  Outcome: Progressing  Goal: Absence of Hospital-Acquired Illness or Injury  Outcome: Progressing  Goal: Optimal Comfort and Wellbeing  Outcome: Progressing  Goal: Readiness for Transition of Care  Outcome: Progressing     Problem: Fall Injury Risk  Goal: Absence of Fall and Fall-Related Injury  Outcome: Progressing     Problem: Skin Injury Risk Increased  Goal: Skin Health and Integrity  Outcome: Progressing     Problem: Pneumonia  Goal: Resolution of Infection Signs and Symptoms  Outcome: Progressing     Problem: Pneumonia  Goal: Effective Oxygenation and Ventilation  Outcome: Progressing

## 2025-01-28 NOTE — PLAN OF CARE
01/28/25 1130   Medicare Message   Important Message from Medicare regarding Discharge Appeal Rights Explained to patient/caregiver   Date IMM was signed 01/28/25   Time IMM was signed 1030       SW meet with patient and daughter at bedside and explained rights of Important Message from Medicare (IMM). Patient and daughter refused to sign IMM.

## 2025-01-28 NOTE — PLAN OF CARE
01/28/25 1340   Post-Acute Status   Post-Acute Authorization Placement;Home Health   Post-Acute Placement Status Patient declined/refused   Home Health Status Referrals Sent   Coverage [facility] [employer] Medicare   Discharge Delays None known at this time   Discharge Plan   Discharge Plan A Home Health       SW meet with patient and daughter at bedside to discuss discharge plan for today.  Per Patient and daughter, they wish to go home with home health. SW explained the recommendations, risks, and benefits of going home for SNF for therapy upon discharge. Patient and daughter verbalized understanding and stated they still wished to DC home today. SW verbalized understanding and inquired about home health preference. Patient and daughter did not have preference and were agreeable to [facility] upon DC.  SW stated she would send referral to [facility] and patient stated she wished to be seen 3x a week by the home health agency. SW stated that the amount of visits would be dictated by the insurance company, not home health agency specifically. Patient verbalized understanding.    SW stated she would send referral to [facility].     13 50 [facility] declined Patient due to being out of service area. Patient's referral needs to be sent to [facility]. SW sent referral to [facility].     14 09 Per [facility], the only disciplines they have in the patient's area is Skilled nursing and PT. Per MD, Patient would need, PT/OT/ Nursing (with wound care 1x a week and labs).  PARTHA contacted [facility] Liaison to see if they have coverage for patient's area.   Pending response from [facility].   PARTHA will send additional referrals out.

## 2025-01-28 NOTE — CONSULTS
[facility] - Telemetry ([facility])  Wound Care    Patient Name:  Shireen Felix   MRN:  [id number]  Date: 2025  Diagnosis: UIP (usual interstitial pneumonitis)    History:     Past Medical History:   Diagnosis Date    Abnormal ECG 2019    Acute bronchitis and bronchiolitis 2024    Aneurysm     Anticoagulant long-term use     Arthritis     CAD in native artery 2019    Diabetes mellitus     Fall 10/10/2019    Formatting of this note might be different from the original. Found sitting on floor next to bed last night Mild confusion today Does not recall falling or how she ended up on floor UA today Labs yesterday unremarkable    Glaucoma     Hypertension     Pulmonary fibrosis     Seizures     Shingles 2017    Stroke        Social History     Socioeconomic History    Marital status: Single   Tobacco Use    Smoking status: Former     Current packs/day: 0.00     Average packs/day: 1 pack/day for 42.3 years (42.3 ttl pk-yrs)     Types: Cigarettes     Start date:      Quit date: 2004     Years since quittin.8    Smokeless tobacco: Former   Substance and Sexual Activity    Alcohol use: No    Drug use: Never    Sexual activity: Not Currently     Partners: Male   Social History Narrative    No pets or smokers in household.     Social Drivers of Health     Financial Resource Strain: Low Risk  (2025)    Overall Financial Resource Strain (CARDIA)     Difficulty of Paying Living Expenses: Not very hard   Food Insecurity: No Food Insecurity (2025)    Hunger Vital Sign     Worried About Running Out of Food in the Last Year: Never true     Ran Out of Food in the Last Year: Never true   Transportation Needs: No Transportation Needs (2025)    TRANSPORTATION NEEDS     Transportation : No   Physical Activity: Unknown (2024)    Exercise Vital Sign     Days of Exercise per Week: 3 days   Recent Concern: Physical Activity - Inactive (2024)    Received from [facility]    Exercise Vital Sign     Days of Exercise per Week: 0 days     Minutes of Exercise per Session: 0 min   Stress: No Stress Concern Present (1/2/2025)    [employer] - Occupational Stress Questionnaire     Feeling of Stress : Only a little   Housing Stability: Low Risk  (1/2/2025)    Housing Stability Vital Sign     Unable to Pay for Housing in the Last Year: No     Homeless in the Last Year: No       Precautions:     Allergies as of 12/31/2024 - Reviewed 12/31/2024   Allergen Reaction Noted    Penicillins Anaphylaxis, Hives, Shortness Of Breath, and Swelling 06/29/2012    Penicillin  05/15/2024    Adhesive Rash 08/26/2014    Doxycycline Nausea Only 04/26/2017    Oxycodone Other (See Comments) 04/11/2019    Sulfa (sulfonamide antibiotics) Itching 04/11/2019       [facility] Assessment Details/Treatment     New consult received on Ms. Felix for ongoing wound assessment of coccygeal pressure injury. Patient with unstageable pressure injury noted on admit. After chart review, patient assessed.  Improvement in appearance of coccyx since admission is noted.   Prior noted/present tan dry peeling skin and eschar has resolved, now revealing small full thickness ulceration to center of prior wound. Wound measures 0.5x0.1x0.1cm with moist beefy redwound bed, surrounding skin with blanchable redness and dry peeling skin also present. Consistent with healing stage 3 pressure injury that is present on admission and resolving from unstageable.  Cleansed with saline, then thick layer zinc moisture barrier paste and covered with foam dressing.   Patient and daughter at bedside updated on findings and recommendations, Dr. Leal updated on improvement in condition of wound since admit.  Patient remains in IsoFlex bed with waffle mattress in place, turning independently in bed.    Coccyx:      Recommendations made to primary team for  wound care, ongoing pressure injury prevention interventions, patient expected to discharge today.     01/28/2025

## 2025-01-28 NOTE — ASSESSMENT & PLAN NOTE
Creatine stable for now. BMP reviewed- noted Estimated Creatinine Clearance: 35 mL/min (based on SCr of 1.1 mg/dL). according to latest data. Based on current GFR, CKD stage is stage 4 - GFR 15-29.  Monitor UOP and serial BMP and adjust therapy as needed. Renally dose meds. Avoid nephrotoxic medications and procedures.  -Creatinine 1.5 on admission however 3 months ago was 1.2    Recent Labs     01/25/25  0501 01/26/25  0513 01/27/25  0620   CREATININE 1.2 1.1 1.1

## 2025-01-28 NOTE — ASSESSMENT & PLAN NOTE
Evaluated by ENT this admission     [Appropriately responsive] : appropriately responsive [Alert] : alert [No Acute Distress] : no acute distress [No Lymphadenopathy] : no lymphadenopathy [Regular Rate Rhythm] : regular rate rhythm [No Murmurs] : no murmurs [Clear to Auscultation B/L] : clear to auscultation bilaterally [Soft] : soft [Non-tender] : non-tender [Non-distended] : non-distended [No HSM] : No HSM [No Lesions] : no lesions [No Mass] : no mass [Oriented x3] : oriented x3 [Labia Majora] : normal [Labia Minora] : normal [Normal] : normal [Uterine Adnexae] : normal [Chaperone Present] : A chaperone was present in the examining room during all aspects of the physical examination

## 2025-01-28 NOTE — ASSESSMENT & PLAN NOTE
Likely secondary to supplemental O2, septal defect.  ENT packed the nose 1/18/25 with some concerns for recurrence on 01/27/24(see tissue paper images of nasal output)      -Mucomyst nasal spray to keep the septum moist  -Will discuss with ENT

## 2025-01-28 NOTE — PT/OT/SLP PROGRESS
"Occupational Therapy   Treatment    Name: Shireen Felix  MRN: [id number]  Admitting Diagnosis:  UIP (usual interstitial pneumonitis)       Recommendations:     Discharge Recommendations: Moderate Intensity Therapy (mod intensity but may choose to d/c with low intensity intervention)  Discharge Equipment Recommendations:  walker, rolling  Barriers to discharge:  Decreased caregiver support    Assessment:     Shireen Felix is a 79 y.o. female with a medical diagnosis of UIP (usual interstitial pneumonitis).  She presents with the following performance deficits affecting function are weakness, impaired endurance, impaired self care skills, impaired functional mobility, impaired balance, impaired cardiopulmonary response to activity, decreased safety awareness.     Rehab Prognosis:  Good; patient would benefit from acute skilled OT services to address these deficits and reach maximum level of function.       Plan:     Patient to be seen 2 x/week to address the above listed problems via self-care/home management, therapeutic activities, therapeutic exercises  Plan of Care Expires: 01/17/25  Plan of Care Reviewed with: patient    Subjective     Chief Complaint: "I know I need to do this"  Patient/Family Comments/goals: to go home stronger  Pain/Comfort:  Pain Rating 1: 0/10    Objective:     Communicated with: nursing and chart review prior to session.  Patient found supine with telemetry upon OT entry to room.    General Precautions: Standard, fall    Orthopedic Precautions:N/A  Braces: N/A  Respiratory Status: Room air     Occupational Performance:     Bed Mobility:    Patient completed Scooting/Bridging with stand by assistance  Patient completed Supine to Sit with stand by assistance     Functional Mobility/Transfers:  Patient completed Sit <> Stand Transfer with contact guard assistance  with  rolling walker   Patient completed Bed <> Chair Transfer using Stand Pivot technique with contact guard assistance " with rolling walker  Functional Mobility: Pt completed 50 ft c RW c CGA for safety with turns. Pt prefers to not go out into shore because of germs.         [facility] 6 Click ADL: 20    Treatment & Education:  TE review in all planes and encouraged completion of B UE AROM therex throughout the day to increase functional strength and activity tolerance needed for ADL completion.  OT role, plan of care, progression of goals, importance of continued OOB activity, ADL/functional transfer and mobility retraining, call don't fall, safety precautions, fall prevention.      Patient left up in chair with all lines intact, call button in reach, chair alarm on, and daughter present    GOALS:   Multidisciplinary Problems       Occupational Therapy Goals          Problem: Occupational Therapy    Goal Priority Disciplines Outcome Interventions   Occupational Therapy Goal     OT, PT/OT Progressing    Description: O.T GOALS MET BY 1-17-25  PT WILL TOLERATE  1 SET X 12 REPS B UE ROM EXERCISE  S WITH UE DRESSING  S WITH LE DRESSING  S WITH TOILET TRANSFERS                         DME Justifications:  No DME recommended requiring DME justifications    Time Tracking:     OT Date of Treatment: 01/28/25  OT Start Time: 1010  OT Stop Time: 1035  OT Total Time (min): 25 min    Billable Minutes:Therapeutic Activity 10  Therapeutic Exercise 15  HOANG Wong      OT/CORINA: CORINA     Number of CORINA visits since last OT visit: 5    1/28/2025

## 2025-01-28 NOTE — PLAN OF CARE
01/28/25 1056   Post-Acute Status   Post-Acute Authorization Placement  (SNF)   Post-Acute Placement Status Set-up Complete/Auth obtained   Coverage [employer] Man[employer] Medicare   Discharge Delays None known at this time   Discharge Plan   Discharge Plan A Skilled Nursing Facility       SW meet with Patient and daughter, Trista, at bedside to discuss DC today to [facility]. Patient and daughter were hesitant to DC today, due to lingering health concerns.   SW sent message to MD to meet with patient at bedside to discuss with Patient and daughter.     SW will continue to follow and assist as needed.

## 2025-01-28 NOTE — ASSESSMENT & PLAN NOTE
Hyperkalemia is likely due to  metabolic acidosis .The patients most recent potassium results are listed below.  Recent Labs     01/25/25  0501 01/26/25  0513 01/27/25  0620   K 4.1 4.0 4.1       Plan  - Monitor for arrhythmias with EKG and/or continuous telemetry.   - - Monitor potassium: Daily  - The patient's hyperkalemia is resolved

## 2025-01-28 NOTE — ASSESSMENT & PLAN NOTE
Likely secondary to the epistaxis.  CT with and without contrast negative.  Was temporarily on TXA neb. Improved significantly after addressing the epistaxis 01/18/2025

## 2025-01-28 NOTE — ASSESSMENT & PLAN NOTE
Patient has Diastolic (HFpEF) heart failure that is Chronic. On presentation their CHF was well compensated.     Latest ECHO  Results for orders placed during the hospital encounter of 06/02/23    Echo    Interpretation Summary  · The left ventricle is normal in size with concentric hypertrophy and low normal systolic function.  · The estimated ejection fraction is 50%.  · Normal left ventricular diastolic function.  · There is abnormal septal wall motion consistent with left bundle branch block.  · Normal right ventricular size with normal right ventricular systolic function.  · Normal central venous pressure (3 mmHg).  · The estimated PA systolic pressure is 30 mmHg.  · Mild mitral regurgitation.    Current Heart Failure Medications  furosemide tablet 20 mg, Daily, Oral  sacubitriL-valsartan 24-26 mg per tablet 1 tablet, 2 times daily, Oral    Plan  - Monitor strict I&Os and daily weights.    - Place on telemetry  - Low sodium diet  - Place on fluid restriction of 1.5 L.   - Cardiology has not been consulted  - The patient's volume status is at their baseline

## 2025-01-30 ENCOUNTER — PATIENT OUTREACH (OUTPATIENT)
Dept: ADMINISTRATIVE | Facility: CLINIC | Age: 80
End: 2025-01-30
Payer: MEDICARE

## 2025-01-30 ENCOUNTER — TELEPHONE (OUTPATIENT)
Dept: INTERNAL MEDICINE | Facility: CLINIC | Age: 80
End: 2025-01-30
Payer: MEDICARE

## 2025-01-30 ENCOUNTER — PATIENT MESSAGE (OUTPATIENT)
Dept: ADMINISTRATIVE | Facility: CLINIC | Age: 80
End: 2025-01-30
Payer: MEDICARE

## 2025-01-30 DIAGNOSIS — Z00.00 ENCOUNTER FOR MEDICARE ANNUAL WELLNESS EXAM: ICD-10-CM

## 2025-01-30 NOTE — TELEPHONE ENCOUNTER
Spoke with Home Health Nurse regarding patient need Home Health Aid to help her bath while she is building her strength. LUL

## 2025-01-30 NOTE — TELEPHONE ENCOUNTER
----- Message from Summer sent at 1/30/2025  9:27 AM CST -----  Contact: Rm/brother  Patient called asking for advice about his sister getting out the hospital and wants to know if he can  get a virtual appointment tomorrow. Please call patient at [phone]. Thanks!

## 2025-01-30 NOTE — TELEPHONE ENCOUNTER
----- Message from Summer sent at 1/30/2025  8:26 AM CST -----  Contact: Christy/Celso FORRESTER nurse  Celso FORRESTER nurse Christy called asking for advice about needing a home health aid, the pt is requesting it. Please call [phone]. Thanks!

## 2025-01-30 NOTE — PROGRESS NOTES
C3 nurse attempted to contact Shireen Felix for a TCC post hospital discharge follow up call. No answer. Voicemail box full. The patient has a scheduled HOSFU appointment with Laron Chaudhari MD on 01/31/2025 @ 1040.

## 2025-01-31 NOTE — PROGRESS NOTES
C3 nurse attempted to contact Shireen Felix for a TCC post hospital discharge follow up call. No answer. Voicemail box full. The patient has a scheduled HOSFU appointment with Laron Chaudhari MD on 02/03/2025 @ 1400.

## 2025-02-03 ENCOUNTER — OFFICE VISIT (OUTPATIENT)
Dept: INTERNAL MEDICINE | Facility: CLINIC | Age: 80
End: 2025-02-03
Payer: MEDICARE

## 2025-02-03 DIAGNOSIS — I50.43 ACUTE ON CHRONIC COMBINED SYSTOLIC AND DIASTOLIC CONGESTIVE HEART FAILURE: ICD-10-CM

## 2025-02-03 DIAGNOSIS — J18.9 HOSPITAL ACQUIRED PNA: ICD-10-CM

## 2025-02-03 DIAGNOSIS — F33.1 MODERATE RECURRENT MAJOR DEPRESSION: ICD-10-CM
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
## 2025-02-03 DIAGNOSIS — I10 PRIMARY HYPERTENSION: ICD-10-CM

## 2025-02-03 DIAGNOSIS — I48.11 LONGSTANDING PERSISTENT ATRIAL FIBRILLATION: ICD-10-CM

## 2025-02-03 DIAGNOSIS — J41.1 MUCOPURULENT CHRONIC BRONCHITIS: Chronic | ICD-10-CM

## 2025-02-03 DIAGNOSIS — D64.9 ACUTE ANEMIA: Primary | ICD-10-CM

## 2025-02-03 DIAGNOSIS — I25.118 CORONARY ARTERY DISEASE OF NATIVE ARTERY OF NATIVE HEART WITH STABLE ANGINA PECTORIS: ICD-10-CM

## 2025-02-03 DIAGNOSIS — E83.42 HYPOMAGNESEMIA: ICD-10-CM

## 2025-02-03 DIAGNOSIS — J96.21 ACUTE ON CHRONIC RESPIRATORY FAILURE WITH HYPOXIA: ICD-10-CM

## 2025-02-03 DIAGNOSIS — I50.22 CHRONIC SYSTOLIC HEART FAILURE: ICD-10-CM

## 2025-02-03 DIAGNOSIS — E87.1 HYPONATREMIA: ICD-10-CM

## 2025-02-03 PROCEDURE — 1159F MED LIST DOCD IN RCRD: CPT | Mod: CPTII,95,, | Performed by: PEDIATRICS

## 2025-02-03 PROCEDURE — 1160F RVW MEDS BY RX/DR IN RCRD: CPT | Mod: CPTII,95,, | Performed by: PEDIATRICS

## 2025-02-03 PROCEDURE — 99496 TRANSJ CARE MGMT HIGH F2F 7D: CPT | Mod: 95,,, | Performed by: PEDIATRICS

## 2025-02-03 PROCEDURE — 1111F DSCHRG MED/CURRENT MED MERGE: CPT | Mod: CPTII,95,, | Performed by: PEDIATRICS

## 2025-02-03 RX ORDER — ALPRAZOLAM 0.25 MG/1
0.25 TABLET ORAL 2 TIMES DAILY PRN
Qty: 60 TABLET | Refills: 0 | Status: SHIPPED | OUTPATIENT
Start: 2025-02-03 | End: 2025-03-05

## 2025-02-03 RX ORDER — ISOSORBIDE MONONITRATE 120 MG/1
120 TABLET, EXTENDED RELEASE ORAL NIGHTLY
Qty: 90 TABLET | Refills: 3 | Status: SHIPPED | OUTPATIENT
Start: 2025-02-03 | End: 2026-02-03

## 2025-02-03 RX ORDER — SPIRONOLACTONE 50 MG/1
50 TABLET, FILM COATED ORAL DAILY
Qty: 90 TABLET | Refills: 3 | Status: SHIPPED | OUTPATIENT
Start: 2025-02-03 | End: 2026-02-03

## 2025-02-03 NOTE — PROGRESS NOTES
TELEMEDICINE VIRTUAL VISIT    Subjective:       Patient ID: Shireen Felix is a 79 y.o. female.    Chief Complaint: Follow-up    Televisit for covid PNA and resp failure. Pat, daughter, and brother on line    28 day protracted stay covid resp failure with COPD, CHF, hyponatremia, hypomagnesium, chronic Afib, CAD, HTN, depression and anxiety.    Labs, d/c summary, meds reviewed with pt and family. Currently epistaxis returned has ENT f/u, coughing still severe with SOB, off O2. Xanax helped. B/P 140/90 off imdur and aldactone.    Follow-up  Associated symptoms include chest pain and coughing. Pertinent negatives include no abdominal pain, arthralgias, congestion, fever, headaches, joint swelling, rash or vomiting.     Review of Systems   Constitutional:  Negative for fever and unexpected weight change.   HENT:  Negative for congestion and rhinorrhea.    Eyes:  Negative for discharge and redness.   Respiratory:  Positive for cough, chest tightness, shortness of breath and wheezing.    Cardiovascular:  Positive for chest pain. Negative for palpitations and leg swelling.   Gastrointestinal:  Negative for abdominal pain, constipation, diarrhea and vomiting.   Genitourinary:  Negative for decreased urine volume, difficulty urinating and menstrual problem.   Musculoskeletal:  Negative for arthralgias and joint swelling.   Skin:  Negative for rash and wound.   Neurological:  Negative for syncope and headaches.   Psychiatric/Behavioral:  Negative for behavioral problems and sleep disturbance. The patient is nervous/anxious.        Objective:      CONSTITUTIONAL: No apparent distress. Does not appear acutely ill or septic. Appears adequately hydrated.  PULM: Breathing somewhat labored but at baseline, mod cough.  PSYCHIATRIC: Alert and conversant and grossly oriented. Mood is grossly neutral. Affect appropriate. Judgment and insight grossly intact.      Assessment:       1. Acute anemia    2. Acute on chronic combined  systolic and diastolic congestive heart failure    3. Acute on chronic respiratory failure with hypoxia    4. Longstanding persistent atrial fibrillation    5. Chronic systolic heart failure    6. Coronary artery disease of native artery of native heart with stable angina pectoris    7. Primary hypertension    8. Hospital acquired PNA    9. Hypomagnesemia    10. Hyponatremia    11. Mucopurulent chronic bronchitis    12. Moderate recurrent major depression        Plan:       Acute anemia  -     CBC Auto Differential; Future; Expected date: 02/03/2025    Acute on chronic combined systolic and diastolic congestive heart failure  -     isosorbide mononitrate (IMDUR) 120 MG 24 hr tablet; Take 1 tablet (120 mg total) by mouth every evening.  Dispense: 90 tablet; Refill: 3    Acute on chronic respiratory failure with hypoxia    Longstanding persistent atrial fibrillation    Chronic systolic heart failure    Coronary artery disease of native artery of native heart with stable angina pectoris    Primary hypertension    Hospital acquired PNA    Hypomagnesemia  -     Magnesium; Future; Expected date: 02/03/2025    Hyponatremia  -     Comprehensive Metabolic Panel; Future; Expected date: 02/03/2025    Mucopurulent chronic bronchitis    Moderate recurrent major depression  -     ALPRAZolam (XANAX) 0.25 MG tablet; Take 1 tablet (0.25 mg total) by mouth 2 (two) times daily as needed for Anxiety.  Dispense: 60 tablet; Refill: 0    Other orders  -     spironolactone (ALDACTONE) 50 MG tablet; Take 1 tablet (50 mg total) by mouth once daily.  Dispense: 90 tablet; Refill: 3       Have [facility] get labs, now in palliative care, hospice was d/w pt, she declines. See pulmonary. Restart aldactone and imdur as B/P high. Prn xanax use d/w pt/family including resp depression.    Transitional Care Note    Family and/or Caretaker present at visit?  Yes.  Diagnostic tests reviewed/disposition: I have reviewed all completed as well as pending  "diagnostic tests at the time of discharge.  Disease/illness education: given  Home health/community services discussion/referrals: Patient has home health established at [place] .   Establishment or re-establishment of referral orders for community resources: No other necessary community resources.   Discussion with other health care providers: No discussion with other health care providers necessary.        Documentation entered by me for this encounter may have been done in part using speech-recognition technology. Although I have made an effort to ensure accuracy, "sound like" errors may exist and should be interpreted in context. -CARLEE Chaudhari MD    Visit Details: This visit was a telemedicine virtual visit with synchronous audio and video. Shireen reported that her location at the time of this visit was in the [facility]. Shireen had the choice to come into office to receive these medical services. Shireen chose and consented to receive these medical services by telemedicine.  "

## 2025-02-03 NOTE — DISCHARGE SUMMARY
[facility] ([facility])  [facility] Medicine  Discharge Summary      Patient Name: Shireen Felix  MRN: [id number]  HERON: [id number]  Patient Class: IP- Inpatient  Admission Date: 12/31/2024  Hospital Length of Stay: 27 days  Discharge Date and Time: 1/28/2025  7:18 PM  Attending Physician: Libia att. providers found   Discharging Provider: Jalen Leal DO  Primary Care Provider: Laron Chaudhari MD    Primary Care Team: Networked reference to record PCT     HPI:   Shireen Felix is a 79 year old female who has  has a past medical history of Abnormal ECG, Acute bronchitis and bronchiolitis, Aneurysm, Anticoagulant long-term use, Arthritis, CAD in native artery, COPD (chronic obstructive pulmonary disease), Diabetes mellitus, Fall, Glaucoma, Hypertension, Seizures, Shingles, and Stroke who presented to Emergency Department for evaluation for cough. Associated symptoms include: congestion, wheezing, and SOB. She was seen by her pulmonologist today, Dr. Sam. Started as sinus pain and drainage and now has progressed to respiratory symptoms. She was seen a few days ago in the ER given prescription for antibiotics steroids but these have not helped much. Today in the office she can barely talk without coughing continually. She is having a difficult time putting more than 3 words together without severe cough paroxysms. ED workup showed Hgb/Hct 11.9/37.0, K+ 3.1, CO2 16, BUN/creatinine 34/1.5. CT chest w/out contrast stable suspected pulmonary fibrosis of the lungs. No acute infiltrates. She has received DuoNeb x 1, solumedrol 125 mg IV x 1, KCL 40 mEq PO x 1 and NS 1L bolus. Pt admitted for COVID.     * No surgery found *      Hospital Course:   Admitted to Hospital Medicine for acute on chronic respiratory failure concerns in setting of COVID-19.  Started on IV methylprednisolone, remdesivir, scheduled respiratory treatments. 01/04: Reported left lower quadrant abdominal pain with tenderness to  palpation, will evaluate via CT as patient is on therapeutic anticoagulation for COVID-19.  CT abdomen/pelvis nonacute. 01/05:  Reported improvements in respiratory status with decreased frequency of cough and dyspnea but still reporting significant symptoms from baseline.  On 1/6, patient had a significant event with acute tachypnea, tachycardia. Pulmonology consulted, orders adjusted. Patient stable overnight, had another event on 1/7 similar to previous day. Possible exacerbation related to anxiety provoked by medications and congestion. Case reviewed, orders adjusted. Per Pulmonology, due to underlying lung fibrosis, patient is likely not benefiting from bronchodilators. Stopped on 1/7, patient did not experience any further episodes of anxiety, respiratory distress. Will continue xanax and prn morphine. Patient with epistaxis, small clots in tissue noted. Reduced supplemental O2 to 1L at rest, stopped full dose lovenox and placed on ppx dose. Hgb stable.   Difficulty with discharge plan, patient originally planned to d/c to SNF, now prefers to d/c home.  Pulmonology discussed home palliative care vs hospice.  Patient and family wish to discuss the Services further.  Per discussion with patient, family will be available for an informational visit on Monday.  Educated patient on results of oxygen evaluation, advised patient to reduce oxygen to 2 L or less at rest, requires 4 L with exertion.  As clinically indicated if significant improvement noted will repeat oxygen evaluation.     01/12/2025  O2 sats stable on 2L (while at rest). Patient and family wants to try and go home, but unable to participate with PT over weekend. Repeat desat study on Monday. May have to consider palliative measures if progress plateaus.      01/13/2025  Feeling slightly better today than she felt a few days ago but overall still significantly short of breath with exertion.  Discussed with her, palliative Care, and her family.  She does  not want to change to hospice at this time.  She wants to try to get better.  She does want to be DNR though.  Appreciate further palliative care involvement.  Still desaturating significantly with exertion and unable to work with PT because of this    01/14/2025  Feeling perhaps slightly better today, attempting to work with physical therapy.  We are hoping that we could either get her to rehab if her oxygen requirements with exertion go down, or potentially get a bigger concentrator at home for increasing her up to 10 L with exertion at home.  Otherwise no changes to the plan    01/15/2025:  Still having coughing spells but notably improved.  Oxygen is being down titrated and she even got a room air today.  We are screening her for rehabs    01/16/2025:  Coughing she states is much worse when oxygen is off.  She was on room air but I put her on 1 L to see if there is truly an effect.  She is still being screened for rehabs.  Overall she looks much better.  She did technically have a fever today to 101.4 but when I evaluate her she looks notably better, CXR is negative for acute change, would not recommend starting antibiotics and she has defervesced    1/17/25: Somnolent this AM, spiking more fevers yesterday evening, and mildly soft BP this AM. Lactate wnl. Alert and oriented on my eval. CXR with ? Infiltrate, more cough, feeling worse. Will start ABX for PNA, blood cultures, sputum culture    01/18/2025: Significant epistaxis and hemoptysis overnight.  CT with and without contrast did not reveal a source of bleed.  Still coughing with hemoptysis this morning.  On exam with obvious epistaxis, packed by ENT, resolved.  Coughing has improved.  Currently on Ventimask to avoid nasal cannula     01/19/2025: Doing better today, no more hemoptysis, less cough, no more epistaxis, hemoglobin up slightly.  Still on Ventimask to avoid nasal cannula but only requiring minimal quantities of oxygen, can take it off to eat.   Listed precautions yesterday evening for COVID given has been more than 10 days since diagnosis    1/20/25: reporting abdominal pain when coughing. States she reported yesterday as well.     1/21/25 patient reports she is feeling better. Abdominal pain with movement. Audible wheezing noted. Duonebs added    1/22/25: adjusted medications for cough. Awaiting placement    1/23/25: patient with persistent cough. Chest x-ray stable. Changed Mucinex to Mucinex DM. Continue steroids, slow taper    1/24/25: patient asking about home oxygen. Currently using venti mask for comfort. Home oxygen eval ordered again. Awaiting placement.    1/25/25: patient no longer requires oxygen. Cough is improving. Awaiting placement    1/26/25: Patient feeling better, cough present but better than earlier in the week. Awaiting placement stable on room air.     1/27/25:  Continued to report improvement in respiratory symptoms, some concerns for recurrence of epistaxis.  Will discuss with ENT    1/28/25:  No further recurrence of isolated episode of epistaxis since yesterday.  Discussed with ENT, given self-resolution and stability in hemoglobin, recommendation for continued management with Afrin p.r.n. and saline nasal spray use.  Counseled on outpatient follow up with ENT.     Discharge plan of care was discussed at length with patient and daughter at bedside including patient's need for close outpatient follow-up. Outpatient follow-ups were scheduled, or if unable to be scheduled ambulatory referrals were placed. Instructed on PCP followup, pulmonology referral followup, medication compliance and potential side effects/adverse events of medications.  Counseled on prednisone taper dosing and strategies on preventing recurrence of epistaxis.  Given borderline blood pressures, held off on resuming home isosorbide, spironolactone with instructions provided to follow up with PCP within 1 week for evaluation of resumption.  Instructed on PCP  follow up within 1 week with repeat lab work to ensure normalization, follow up of pending labs, or any further evaluation as necessitated.. All questions and concerns were answered. Return precautions provided.  Patient instructed to return to the hospital or seek medical care if baseline status should suddenly change, return of symptoms occurs, or for any new or concerning symptoms that arise.  Patient was in agreement with plan of care going forward. Patient continued to remain afebrile, hemodynamically stable without supplemental oxygen, able to tolerate diet, and ambulate without any significant concerns.  Patient has significant debility and was extensively counseled on consideration of PT/OT recommendations for SNF placement, however patient declined placement and preferred home health instead. SW/CM set up home health.  Patient seen and examined on the day of discharge. Patient deemed stable for discharge.      Goals of Care Treatment Preferences:  Code Status: DNR    Living Will: Yes     What is most important right now is to focus on remaining as independent as possible, symptom/pain control.  Accordingly, we have decided that the best plan to meet the patient's goals includes continuing with treatment.      SDOH Screening:  The patient was screened for utility difficulties, food insecurity, transport difficulties, housing insecurity, and interpersonal safety and there were no concerns identified this admission.     Consults:   Consults (From admission, onward)          Status Ordering Provider     Inpatient consult to Pulmonology  Once        Provider:  Brody Manley MD    Completed TYRONE LOCKHART     Inpatient consult to Palliative Care  Once        Provider:  (Not yet assigned)    Completed YANA KELLY     Inpatient consult to Social Work  Once        Provider:  (Not yet assigned)    Completed KISHAN YADAV     Inpatient consult to Pulmonology  Once        Provider:  Christi Ty  MD SCOUT    Completed NICHOLAS, APRIL J.              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] 07/26/2019 Yes    Hyperkalemia [E87.5] 01/20/2025 No    Nasal septal deviation [J34.2] 01/19/2025 Yes    Atrial fibrillation [I48.91] 01/19/2025 Yes    Hemoptysis [R04.2] 01/18/2025 No    Hospital acquired PNA [J18.9, Y95] 01/17/2025 No    Encounter for palliative care [Z51.5] 01/13/2025 Not Applicable    Epistaxis [R04.0] 01/11/2025 No    Acute on chronic respiratory failure with hypoxia [J96.21] 01/09/2025 Yes    CKD (chronic kidney disease) [N18.9] 06/03/2023 Yes    Chronic systolic heart failure [I50.22] 03/15/2022 Yes    COVID-19 [U07.1] 02/21/2022 Yes    Mixed hyperlipidemia [E78.2] 08/19/2021 Yes    Seizure [R56.9] 12/23/2019 Yes    Normocytic anemia [D64.9] 12/23/2019 Yes    Fall [W19.XXXA] 10/10/2019 Yes    Coronary artery disease of native artery of native heart with stable angina pectoris [I25.118]  Yes    COPD exacerbation [J44.1] 08/04/2019 Yes    Nasal septal perforation [J34.89] 05/31/2019 Yes    GERD (gastroesophageal reflux disease) [K21.9] 03/26/2015 Yes      Problems Resolved During this Admission:       Discharged Condition: stable    Disposition: Home-Health Care [facility]    Follow Up:   Contact information for follow-up providers       [facility] Follow up.    Specialty: Pulmonology  Contact information:  [address] [place] [address] [address] [place] [address] [phone]  Additional information:  Please take [address] for the Medical Office Building. Check in on the 3rd floor, to the left.             [facility] - [facility] Nose Throat Follow up.    Specialty: Otolaryngology  Contact information:  [address] [place] [address] [address]onda [address] [address] [phone]  Additional information:  Please take [address] for the Medical Office Building. Check in on the 2nd floor.                     Contact information  for after-discharge care       Home Medical Care       [facility] .    Service: Home Health Services  Contact information:  [address]  [facility] [address]  [phone]                                 Patient Instructions:      Ambulatory referral/consult to Pulmonology   Standing Status: Future   Referral Priority: Routine Referral Type: Consultation   Referral Reason: Specialty Services Required   Requested Specialty: Pulmonary Disease   Number of Visits Requested: 1     Ambulatory referral/consult to ENT   Standing Status: Future   Referral Priority: Routine Referral Type: Consultation   Referral Reason: Specialty Services Required   Requested Specialty: Otolaryngology   Number of Visits Requested: 1       Significant Diagnostic Studies: Significant Labs:   Recent Labs   Lab 01/27/25  0620 01/28/25  0516   * 135*   K 4.1 3.8    105   CO2 22* 20*   BUN 52* 58*   CREATININE 1.1 1.3   GLU 88 82   ANIONGAP 9 10     Recent Labs   Lab 01/27/25 0620 01/28/25  0516   MG 1.6 1.6   PHOS 2.4* 2.1*    Recent Labs   Lab 01/27/25 0620 01/28/25  0516   WBC 7.46 7.84   HGB 9.5* 9.5*   HCT 31.1* 31.1*    361   GRAN 59.0 66.0              Significant Imaging:   X-Ray Chest 1 View   Final Result      No acute abnormality.         Electronically signed by: Shane Jones   Date:    01/21/2025   Time:    13:32      CT Chest W Wo Contrast   Final Result      Moderate bilateral interstitial pulmonary fibrosis.  No acute infiltrates.  The airways appear unremarkable.      No evidence of pulmonary embolism         Electronically signed by: Tommy Maravilla MD   Date:    01/18/2025   Time:    08:02      X-Ray Chest AP Portable   Final Result      Chronic interstitial change slight increased hazy density over the left lung without large consolidation.  Acute infiltrate possible..         Electronically signed by: Margot Reyes   Date:    01/17/2025    Time:    09:16      X-Ray Chest AP Portable   Final Result      Chronic lung disease.  No acute finding.         Electronically signed by: Liborio Johnson   Date:    01/16/2025   Time:    10:13      CT Head Without Contrast   Final Result      No acute or adverse intracranial finding         Electronically signed by: Cyn Thacker   Date:    01/15/2025   Time:    23:04      CTA Chest Non-Coronary (PE Studies)   Final Result      No evidence of pulmonary embolism.      Suspect chronic interstitial lung disease similar to prior.      See additional findings above      All CT scans at this facility use dose modulation, iterative reconstruction and/or weight based dosing when appropriate to reduce radiation dose to as low as reasonably achievable.         Electronically signed by: Brody Willis MD   Date:    01/07/2025   Time:    12:29      US Upper Extremity Arteries Right   Final Result    No occlusion or severe stenosis identified.  Further evaluation as needed.      Finalized on: 1/6/2025 8:19 PM By:  Shane Jones MD   [facility]# [id number]      2025-01-06 20:21:27.027    [facility]      US Upper Extremity Veins Right   Final Result    Normal right upper extremity venous ultrasound without DVT identified.      Finalized on: 1/6/2025 8:18 PM By:  Shane Jones MD   [facility]# [id number]      2025-01-06 20:20:06.966    [facility]      X-Ray Chest 1 View   Final Result    As above.      Finalized on: 1/6/2025 7:34 PM By:  Shane Jones MD   [facility]# [id number]      2025-01-06 19:36:04.604    [facility]      CT Abdomen Pelvis  Without Contrast   Final Result      No acute process..  Simple cyst of the bilateral kidneys.  Atherosclerotic disease.  Bilateral hip replacement.  Endplate deformities.  Remaining findings as above.      All CT scans   are performed using dose optimization techniques including the following: automated exposure control; adjustment of the mA and/or kV; use of iterative reconstruction technique.  Dose modulation was employed for  LEWIS by means of: Automated exposure control; adjustment of the mA and/or kV according to patient size (this includes techniques or standardized protocols for targeted exams where dose is matched to indication/reason for exam; i.e. extremities or head); and/or use of iterative reconstructive technique.         Electronically signed by: Roland Ken   Date:    01/05/2025   Time:    00:03      X-Ray Chest AP Portable   Final Result      No acute abnormality.         Electronically signed by: Roland Ken   Date:    01/03/2025   Time:    20:43      CT Head Without Contrast   Final Result      No acute abnormality.      Prior aneurysm clipping.      All CT scans at this facility are performed  using dose modulation techniques as appropriate to performed exam including the following:  automated exposure control; adjustment of mA and/or kV according to the patients size (this includes techniques or standardized protocols for targeted exams where dose is matched to indication/reason for exam: i.e. extremities or head);  iterative reconstruction technique.         Electronically signed by: Shane Jones   Date:    01/01/2025   Time:    20:27      CT Chest Without Contrast   Final Result      Stable suspected pulmonary fibrosis of the lungs.  No acute infiltrates.         Electronically signed by: Tommy Maravilla MD   Date:    12/31/2024   Time:    13:59              Pending Diagnostic Studies:       None           Medications:  Reconciled Home Medications:      Medication List        START taking these medications      benzonatate 200 MG capsule  Commonly known as: TESSALON  Take 1 capsule (200 mg total) by mouth 3 (three) times daily as needed for Cough. .     dextromethorphan-guaiFENesin  mg  mg per 12 hr tablet  Commonly known as: MUCINEX DM  Take 1 tablet by mouth 2 (two) times daily.     gabapentin 300 MG capsule  Commonly known as: NEURONTIN  Take 1 capsule (300 mg total) by mouth 2 (two) times daily.  .     predniSONE 5 MG tablet  Commonly known as: DELTASONE  Take 1 tablet (5 mg total) by mouth 3 (three) times daily for 2 days, THEN 1 tablet (5 mg total) 2 (two) times a day for 2 days, THEN 1 tablet (5 mg total) once daily for 2 days. ..  Start taking on: January 28, 2025     promethazine-dextromethorphan 6.25-15 mg/5 mL Syrp  Commonly known as: PROMETHAZINE-DM  Take 5 mLs by mouth every 8 (eight) hours as needed (Cough). .     sodium chloride 0.65 % nasal spray  Commonly known as: OCEAN  1 spray by Nasal route 3 (three) times daily. .            CHANGE how you take these medications      cholestyramine 4 gram packet  Commonly known as: QUESTRAN  TAKE 1 PACKET BY MOUTH TWICE DAILY AS DIRECTED  What changed: See the new instructions.     isosorbide mononitrate 120 MG 24 hr tablet  Commonly known as: IMDUR  Take 1 tablet (120 mg total) by mouth every evening. HOLD UNTIL FOLLOWUP WITH PCP WITHIN ONE WEEK  What changed: additional instructions  oxymetazoline 0.05 % nasal spray  Commonly known as: AFRIN (OXYMETAZOLINE)  2 sprays by Nasal route 2 (two) times daily as needed for Congestion (Nosebleeds). If no resolution despite Afrin trial, please go to  the ED.  Ask about: Should I take this medication?        spironolactone 50 MG tablet  Commonly known as: ALDACTONE  Take 1 tablet (50 mg total) by mouth once daily. HOLD UNTIL FOLLOWUP WITH PCP  What changed: additional instructions            CONTINUE taking these medications      albuterol 2.5 mg /3 mL (0.083 %) nebulizer solution  Commonly known as: PROVENTIL  Take 3 mLs (2.5 mg total) by nebulization every 4 (four) hours as needed for Wheezing. Rescue     albuterol-budesonide 90-80 mcg/actuation  Commonly known as: Airsupra  Inhale 2 puffs into the lungs every 4 (four) hours as needed (wheezing, cough).     atorvastatin 40 MG tablet  Commonly known as: LIPITOR  Take 1 tablet (40 mg total) by mouth once daily.     bisoprolol 5 MG tablet  Commonly known as:  ZEBETA  Take 1 tablet (5 mg total) by mouth once daily.     blood sugar diagnostic Strp  To check BG 1 times daily, to use with insurance preferred meter     blood-glucose meter kit  To check BG 1 times daily, to use with insurance preferred meter     cholecalciferol (vitamin D3) 1,250 mcg (50,000 unit) capsule  Take 1 capsule (50,000 Units total) by mouth every 7 days.     clopidogreL 75 mg tablet  Commonly known as: PLAVIX  Take 1 tablet (75 mg total) by mouth once daily.     cromolyn 5.2 mg/spray (4 %) nasal spray  Commonly known as: NASALCROM  1 spray by Nasal route 4 (four) times daily.     denosumab 60 mg/mL Syrg  Commonly known as: PROLIA  every 6 (six) months.     dicyclomine 10 MG capsule  Commonly known as: BENTYL  Take 1 capsule (10 mg total) by mouth 2 (two) times daily.     empagliflozin 10 mg tablet  Commonly known as: JARDIANCE  Take 1 tablet (10 mg total) by mouth once daily.     ENTRESTO 24-26 mg per tablet  Generic drug: sacubitriL-valsartan  Take 1 tablet by mouth 2 (two) times daily.     EScitalopram oxalate 20 MG tablet  Commonly known as: LEXAPRO  Take 1 tablet (20 mg total) by mouth once daily.     fluocinonide 0.05 % external solution  Commonly known as: LIDEX  Apply topically 2 (two) times daily. Use on itchy spots on scalp and ear     fluticasone propionate 50 mcg/actuation nasal spray  Commonly known as: FLONASE  2 sprays (100 mcg total) by Each Nostril route once daily.     furosemide 20 MG tablet  Commonly known as: LASIX  Take 1 tablet (20 mg total) by mouth daily as needed (for edema, swelling, fluid build up, or 3 pound weight gain in 24 hours).     hydrOXYzine HCL 25 MG tablet  Commonly known as: ATARAX  Take 2 tablets (50 mg total) by mouth 2 (two) times daily as needed for Itching.     ipratropium 42 mcg (0.06 %) nasal spray  Commonly known as: ATROVENT  2 sprays by Each Nostril route 2 (two) times daily.     ketoconazole 2 % cream  Commonly known as: NIZORAL  Apply topically 2  (two) times daily. For dry flaky patches of ear.     lancets Misc  To check BG 1 times daily, to use with insurance preferred meter     latanoprost 0.005 % ophthalmic solution  Place 1 drop into both eyes every evening.     levETIRAcetam 1000 MG tablet  Commonly known as: KEPPRA  Take 1 tablet (1,000 mg total) by mouth 2 (two) times daily.     magnesium oxide 400 mg (241.3 mg magnesium) tablet  Commonly known as: MAG-OX  Take 1 tablet (400 mg total) by mouth once daily.     montelukast 10 mg tablet  Commonly known as: SINGULAIR  Take 1 tablet (10 mg total) by mouth once daily.     mucus clearing device  Commonly known as: AEROBIKA OSCILLATING PEP SYSTM  by Misc.(Non-Drug; Combo Route) route 4 (four) times daily.     nitroGLYCERIN 0.4 MG SL tablet  Commonly known as: NITROSTAT  Place 1 tablet (0.4 mg total) under the tongue every 5 (five) minutes as needed for Chest pain.     omeprazole 40 MG capsule  Commonly known as: PRILOSEC  Take 1 capsule (40 mg total) by mouth every morning.     pantoprazole 40 MG tablet  Commonly known as: PROTONIX  TAKE 1 TABLET BY MOUTH TWICE DAILY     potassium chloride SA 20 MEQ tablet  Commonly known as: K-DUR,KLOR-CON  Take 1 tablet (20 mEq total) by mouth once daily. Take extra dose with Lasix - fluid pill     pramoxine-hydrocortisone cream  Commonly known as: ANALPRAM HC  Apply topically 3 (three) times daily.     ranolazine 1,000 mg Tb12  Commonly known as: RANEXA  Take 1 tablet (1,000 mg total) by mouth 2 (two) times daily.     roflumilast 500 mcg Tab  Commonly known as: DALIRESP  Take 1 tablet (500 mcg total) by mouth once daily.     sodium chloride 3% 3 % nebulizer solution  Take 4 mLs by nebulization as needed for Cough (chest congestion).     TEZSPIRE 210 mg/1.91 mL (110 mg/mL) Pnij  Generic drug: tezepelumab-ekko  Inject 210 mg into the skin every 28 days.     tiZANidine 4 MG tablet  Commonly known as: ZANAFLEX  Take 1 tablet (4 mg total) by mouth every 12 (twelve) hours.      RODERICK ELLIPTA 200-62.5-25 mcg inhaler  Generic drug: fluticasone-umeclidin-vilanter  Inhale 1 puff into the lungs once daily.     triamcinolone acetonide 0.025% 0.025 % cream  Commonly known as: KENALOG  Apply topically 2 (two) times daily. PRN rash and itching of ear. Mild steroid cream.                   Indwelling Lines/Drains at time of discharge:   Lines/Drains/Airways       None                   Time spent on the discharge of patient: 45 minutes         Jalen Leal DO  Department of Hospital Medicine  [facility] ([facility])    Voice recognition software was used in the creation of this note/communication and any sound-alike/typographical errors which may have occurred despite initial review prior to signing should be taken in context when interpreting.  If such errors prevent a clear understanding of the note/communication, please contact the provider/office for clarification.

## 2025-02-06 NOTE — PROGRESS NOTES
Chief complaint:    Chief Complaint   Patient presents with    Epistaxis     Pt states that she went to the ER because she was having nose bleeds and she saw Dr. Lopez and he cauterized it and he told her to come in to see Dr. Light           Referring Provider:  Jalen Leal Do  [address] [place] [address]      History of present illness:     Ms. Felix is a 79 y.o. presenting for evaluation of epistaxis.     Onset of bleeding: about 1 month ago; started while in the [facility] about a month ago  Required cautery while in the hospital   Frequency of nose bleeds: it was happening frequently, now getting some dried, old dark blood  Laterality:  right  Volume of bleeding: heavy at first, milder now    She does have also have issues with crusting and septal perforation      She does also have severe right facial pressure and pain and nasal obstruction present for several months.     History      Past Medical History:   Past Medical History:   Diagnosis Date    Abnormal ECG 08/05/2019    Acute bronchitis and bronchiolitis 12/31/2024    Aneurysm     Anticoagulant long-term use     Arthritis     CAD in native artery 08/05/2019    Diabetes mellitus     Fall 10/10/2019    Formatting of this note might be different from the original. Found sitting on floor next to bed last night Mild confusion today Does not recall falling or how she ended up on floor UA today Labs yesterday unremarkable    Glaucoma     Hypertension     Pulmonary fibrosis     Seizures     Shingles 05/27/2017    Stroke          Past Surgical History:  Past Surgical History:   Procedure Laterality Date    BRAIN SURGERY      CARDIAC CATHETERIZATION      CATHETERIZATION OF BOTH LEFT AND RIGHT HEART N/A 5/17/2024    Procedure: CATHETERIZATION, HEART, BOTH LEFT AND RIGHT;  Surgeon: Rachelle Sarabia MD;  Location: [facility] CATH LAB;  Service: Cardiology;  Laterality: N/A;    COLONOSCOPY N/A 09/21/2023    Procedure: COLONOSCOPY;  Surgeon:  Joanna Leal MD;  Location: [facility] ENDO;  Service: Endoscopy;  Laterality: N/A;    CORONARY ANGIOPLASTY      EPIDURAL STEROID INJECTION INTO CERVICAL SPINE N/A 2/7/2024    Procedure: Cervical IL XANDER, Level C6/7, RN IV sedation;  Surgeon: Francisco Oshea MD;  Location: [facility] PAIN MGT;  Service: Pain Management;  Laterality: N/A;    EPIDURAL STEROID INJECTION INTO LUMBAR SPINE Bilateral 11/12/2019    Procedure: TF XANDER L4/5;  Surgeon: Desean Dias MD;  Location: [facility] PAIN MGT;  Service: Pain Management;  Laterality: Bilateral;    HYSTERECTOMY      INJECTION OF ANESTHETIC AGENT AROUND MEDIAL BRANCH NERVES INNERVATING LUMBAR FACET JOINT Bilateral 01/31/2020    Procedure: Bilateral L3-5 MBB;  Surgeon: Desean Dias MD;  Location: [facility] PAIN MGT;  Service: Pain Management;  Laterality: Bilateral;    INJECTION OF ANESTHETIC AGENT INTO SACROILIAC JOINT Right 11/16/2021    Procedure: Right BLOCK, SACROILIAC JOINT Right GTB with RN IV sedation;  Surgeon: Yao Fulton MD;  Location: [facility] PAIN MGT;  Service: Pain Management;  Laterality: Right;    INJECTION OF ANESTHETIC AGENT INTO SACROILIAC JOINT Bilateral 07/28/2023    Procedure: Bilateral GT bursa + bilateral SIJ injection;  Surgeon: Francisco Oshea MD;  Location: [facility] PAIN MGT;  Service: Pain Management;  Laterality: Bilateral;    INJECTION OF JOINT Bilateral 07/09/2020    Procedure: Bilateral shoulder GH Joint injection with local;  Surgeon: Desean Dias MD;  Location: [facility] PAIN MGT;  Service: Pain Management;  Laterality: Bilateral;    INJECTION OF JOINT Bilateral 07/28/2023    Procedure: Bilateral GT bursa + bilateral SIJ injection NEEDS ADDITIONAL SEDATION AND MORE TIME BEFORE INJECTION RN IV Sedation;  Surgeon: Francisco sOhea MD;  Location: [facility] PAIN MGT;  Service: Pain Management;  Laterality: Bilateral;    INJECTION OF JOINT N/A 10/25/2023    Procedure: Sacrococcygeal joint injection;  Surgeon: Francisco Oshea MD;  Location: [facility] PAIN MGT;  Service:  Pain Management;  Laterality: N/A;    OOPHORECTOMY      SELECTIVE INJECTION OF ANESTHETIC AGENT AROUND LUMBAR SPINAL NERVE ROOT BY TRANSFORAMINAL APPROACH Bilateral 04/26/2023    Procedure: Bilateral L4/5 TF XANDER RN IV Sedation;  Surgeon: Francisco Oshea MD;  Location: [facility] PAIN MGT;  Service: Pain Management;  Laterality: Bilateral;    TRANSFORAMINAL EPIDURAL INJECTION OF STEROID Bilateral 07/23/2019    Procedure: Bilateral L3/4 Transforaminal Epidural Steroid Injection;  Surgeon: Dseean Dias MD;  Location: [facility] PAIN MGT;  Service: Pain Management;  Laterality: Bilateral;    TRANSFORAMINAL EPIDURAL INJECTION OF STEROID Bilateral 03/10/2020    Procedure: Right T12/L1 TF XANDER with local;  Surgeon: Desean Dias MD;  Location: [facility] PAIN MGT;  Service: Pain Management;  Laterality: Bilateral;    TRANSFORAMINAL EPIDURAL INJECTION OF STEROID Right 06/19/2020    Procedure: Right T12/L1 TFESI Covid day of procedure;  Surgeon: Desean Dias MD;  Location: [facility] PAIN MGT;  Service: Pain Management;  Laterality: Right;         Medications: Medication list reviewed. She  has a current medication list which includes the following prescription(s): albuterol, albuterol-budesonide, alprazolam, atorvastatin, benzonatate, bisoprolol, blood sugar diagnostic, blood-glucose meter, cholecalciferol (vitamin d3), cholestyramine, clopidogrel, cromolyn, denosumab, dextromethorphan-guaifenesin  mg, dicyclomine, empagliflozin, escitalopram oxalate, fluocinonide, fluticasone propionate, trelegy ellipta, furosemide, gabapentin, hydroxyzine hcl, ipratropium, isosorbide mononitrate, ketoconazole, lancets, levetiracetam, magnesium oxide, montelukast, mucus clearing device, nitroglycerin, omeprazole, pantoprazole, potassium chloride sa, pramoxine-hydrocortisone, promethazine-dextromethorphan, ranolazine, roflumilast, entresto, sodium chloride, sodium chloride 3%, spironolactone, tezepelumab-ekko, tizanidine, triamcinolone acetonide  0.025%, latanoprost, and levofloxacin.     Allergies:   Review of patient's allergies indicates:   Allergen Reactions    Penicillins Anaphylaxis, Hives, Shortness Of Breath and Swelling    Penicillin     Adhesive Rash    Doxycycline Nausea Only    Oxycodone Other (See Comments)     Fatigue      Sulfa (sulfonamide antibiotics) Itching         Family history: family history includes Breast cancer in her maternal aunt, maternal aunt, and maternal aunt; No Known Problems in her father and mother.         Social History          Alcohol use:  reports no history of alcohol use.            Tobacco:  reports that she quit smoking about 20 years ago. Her smoking use included cigarettes. She started smoking about 63 years ago. She has a 42.3 pack-year smoking history. She has quit using smokeless tobacco.         Physical Examination      Vitals: There were no vitals taken for this visit.      General: Well developed, well nourished, well hydrated.     Voice: no hoarseness, no dysarthria      Head/Face: Normocephalic, atraumatic. No scars or lesions. Facial musculature equal.     Eyes: No scleral icterus or conjunctival hemorrhage. EOMI. PERRLA.     Ears:     Right ear: No gross deformity. EAC is clear of debris and erythema. TM are intact with a pneumatized middle ear. No signs of retraction, fluid or infection.      Left ear: No gross deformity. EAC is clear of debris and erythema. TM are intact with a pneumatized middle ear. No signs of retraction, fluid or infection.      Nose: No gross deformity or lesions. No purulent discharge. Large septal perforation, bloody crusting in R nare    Mouth/Oropharynx: Lips without any lesions. No mucosal lesions within the oropharynx. No tonsillar exudate or lesions. Pharyngeal walls symmetrical. Uvula midline. Tongue midline without lesions.     Neck: Trachea midline. No masses. No thyromegaly or nodules palpated.     Lymphatic: No lymphadenopathy in the neck.     Extremities: No  cyanosis. Warm and well-perfused.     Skin: No scars or lesions on face or neck.      Neurologic: Moving all extremities without gross abnormality.CN II-XII grossly intact. House-Brackmann 1/6. No signs of nystagmus.          Data reviewed      Review of records:      I reviewed records from the referring provider's office visits, describing the history, workup, and/or treatment of this problem thus far.        Imaging:      I have independently reviewed the following imaging with the findings noted below:       Procedures:  Procedure - control of epistaxis, anterior, complex    Description: Risks, benefits, and alternatives of the procedure were discussed with the patient, and the patient consented to the control of epistaxis.  The nasal cavity was sprayed with a topical decongestant and topical anesthetic. After adequate anesthesia was obtained, control of epistaxis was performed for both side(s).  The concerning area(s) for bleeding were addressed with extensive silver nitrate applied topically to the area(s), taking care not to over-treat at risk of septal perforation. At the end of the procedure there was no further bleeding from the nose and sinuses.  The patient tolerated the procedure well with no complications.    Nasal Findings  -     crusting removed from the septal perf. The area(s) causing the epistaxis (and cauterized today) were found to be arising from prominent blood vessels located at the  periphery of the perforated septum        Assessment/Plan:    1. Chronic maxillary sinusitis    2. UIP (usual interstitial pneumonitis)    3. Recurrent epistaxis         Jetty has bilateral epistaxis at the periphery of her septal perforation.  Cauterization was deemed necessary at the current clinic visit.    We discussed preventive measures such as the application of moisturizing gel to the nose (ie. AYR nasal gel) at night and saline spray in the daytime.  We discussed other issues which can cause recurrence  of nosebleeds, such as NSAIDs, aggressive nose blowing/wiping, etc.  If there is further bleeding she should return to the clinic for re-evaluation.  Conservative measures for nosebleeds include pinching nose, ice to area, and Afrin.  We will see Shireen back if there are further issues.    Shireen  presents today for initial evaluation. The patient presents with significant evidence of chronic sinusitis . My recommendation is treatment of Chronic Rhinosinusitis with a prolonged course of antibiotics. The patient will also adhere to an aggressive sinus protocol consisting of a short course of Afrin, and daily Flonase and saline irrigations (Sinus protocol instructions were provided). The patient will return in 3-4 weeks after completion of treatment with a CT sinus. We discussed the possible need for sinus surgery in the event of persistent sinusitis resistant to maximal medical management. We discussed at length the risk of sinus surgery including, but not limited to, recurrence of disease, bleeding, infection, septal perforation, tooth or cheek numbness, vision changes, orbital injury, CSF leak and changes in smell.  Furthermore, we discussed that Chronic Rhinosinusitis is a chronic disease, and while surgery is often part of the treatment algorithm, continued medical management is imperative to reduce the risk of recurrence and increase the number of symptom free days. The patient had opportunity to ask questions and I answered all of them to their satisfaction.         Jani Light MD  [facility]   [facility] [address]  [place] [address]  P: [phone]  F: [phone]

## 2025-02-07 ENCOUNTER — OFFICE VISIT (OUTPATIENT)
Dept: OTOLARYNGOLOGY | Facility: CLINIC | Age: 80
End: 2025-02-07
Payer: MEDICARE

## 2025-02-07 DIAGNOSIS — J32.0 CHRONIC MAXILLARY SINUSITIS: Primary | ICD-10-CM

## 2025-02-07 DIAGNOSIS — R04.0 RECURRENT EPISTAXIS: ICD-10-CM

## 2025-02-07 DIAGNOSIS — J84.112 UIP (USUAL INTERSTITIAL PNEUMONITIS): ICD-10-CM

## 2025-02-07 PROCEDURE — 3288F FALL RISK ASSESSMENT DOCD: CPT | Mod: CPTII,S$GLB,, | Performed by: STUDENT IN AN ORGANIZED HEALTH CARE EDUCATION/TRAINING PROGRAM

## 2025-02-07 PROCEDURE — 1101F PT FALLS ASSESS-DOCD LE1/YR: CPT | Mod: CPTII,S$GLB,, | Performed by: STUDENT IN AN ORGANIZED HEALTH CARE EDUCATION/TRAINING PROGRAM

## 2025-02-07 PROCEDURE — 30903 CONTROL OF NOSEBLEED: CPT | Mod: 50,S$GLB,, | Performed by: STUDENT IN AN ORGANIZED HEALTH CARE EDUCATION/TRAINING PROGRAM

## 2025-02-07 PROCEDURE — 99214 OFFICE O/P EST MOD 30 MIN: CPT | Mod: 25,S$GLB,, | Performed by: STUDENT IN AN ORGANIZED HEALTH CARE EDUCATION/TRAINING PROGRAM

## 2025-02-07 PROCEDURE — 1111F DSCHRG MED/CURRENT MED MERGE: CPT | Mod: CPTII,S$GLB,, | Performed by: STUDENT IN AN ORGANIZED HEALTH CARE EDUCATION/TRAINING PROGRAM

## 2025-02-07 PROCEDURE — 99999 PR PBB SHADOW E&M-EST. PATIENT-LVL IV: CPT | Mod: PBBFAC,,, | Performed by: STUDENT IN AN ORGANIZED HEALTH CARE EDUCATION/TRAINING PROGRAM

## 2025-02-07 PROCEDURE — 1159F MED LIST DOCD IN RCRD: CPT | Mod: CPTII,S$GLB,, | Performed by: STUDENT IN AN ORGANIZED HEALTH CARE EDUCATION/TRAINING PROGRAM

## 2025-02-07 RX ORDER — LEVOFLOXACIN 500 MG/1
500 TABLET, FILM COATED ORAL DAILY
Qty: 21 TABLET | Refills: 0 | Status: SHIPPED | OUTPATIENT
Start: 2025-02-07 | End: 2025-02-28

## 2025-02-11 ENCOUNTER — TELEPHONE (OUTPATIENT)
Dept: INTERNAL MEDICINE | Facility: CLINIC | Age: 80
End: 2025-02-11
Payer: MEDICARE

## 2025-02-11 NOTE — TELEPHONE ENCOUNTER
Contacted Christy  with  to let her know labs were received. Will give to Dr. Chaudhari to review.

## 2025-02-11 NOTE — TELEPHONE ENCOUNTER
----- Message from [employer] sent at 2/11/2025  8:38 AM CST -----  Contact: [facility]  .Type:  Patient Requesting Call    Who Called:[facility]  Does the patient know what this is regarding?:make sure you received labs 2/11 that was faxed over   Would the patient rather a call back or a response via [facility]? call  Best Call Back Number:[phone]  Additional Information:

## 2025-02-12 ENCOUNTER — PATIENT MESSAGE (OUTPATIENT)
Dept: INTERNAL MEDICINE | Facility: CLINIC | Age: 80
End: 2025-02-12
Payer: MEDICARE

## 2025-02-13 ENCOUNTER — TELEPHONE (OUTPATIENT)
Dept: INTERNAL MEDICINE | Facility: CLINIC | Age: 80
End: 2025-02-13
Payer: MEDICARE

## 2025-02-13 NOTE — TELEPHONE ENCOUNTER
Called Christy at [facility][1]. She said that the daughter wanted labs to be redrawn. [facility][1] just laura labs on 2/10. Attempted to call daughter Trista. JASMYN

## 2025-02-13 NOTE — TELEPHONE ENCOUNTER
----- Message from Gianni sent at 2/13/2025 12:44 PM CST -----  Type:  Needs Medical Advice    Who Called: IZABELLA Griffith [[id number]]  Symptoms (please be specific):    How long has patient had these symptoms:    Pharmacy name and phone #:    Would the patient rather a call back or a response via [facility]?   Best Call Back Number: [phone]  Additional Information:  [facility] called to have order for lab.  Celso will take the lab

## 2025-02-18 ENCOUNTER — HOSPITAL ENCOUNTER (OUTPATIENT)
Dept: RADIOLOGY | Facility: HOSPITAL | Age: 80
Discharge: HOME | End: 2025-02-18
Attending: ORTHOPAEDIC SURGERY
Payer: MEDICARE

## 2025-02-18 ENCOUNTER — OFFICE VISIT (OUTPATIENT)
Dept: ORTHOPEDICS | Facility: CLINIC | Age: 80
End: 2025-02-18
Payer: MEDICARE

## 2025-02-18 VITALS — HEIGHT: 64 IN | BODY MASS INDEX: 18.44 KG/M2 | WEIGHT: 108 LBS

## 2025-02-18 DIAGNOSIS — M79.641 BILATERAL HAND PAIN: Primary | ICD-10-CM

## 2025-02-18 DIAGNOSIS — M18.0 ARTHRITIS OF CARPOMETACARPAL (CMC) JOINT OF BOTH THUMBS: Primary | ICD-10-CM

## 2025-02-18 DIAGNOSIS — M10.9 GOUT OF RIGHT HAND, UNSPECIFIED CAUSE, UNSPECIFIED CHRONICITY: ICD-10-CM

## 2025-02-18 DIAGNOSIS — M79.642 BILATERAL HAND PAIN: ICD-10-CM

## 2025-02-18 DIAGNOSIS — L03.011 CELLULITIS OF RIGHT THUMB: ICD-10-CM

## 2025-02-18 DIAGNOSIS — M79.642 BILATERAL HAND PAIN: Primary | ICD-10-CM

## 2025-02-18 DIAGNOSIS — M79.641 BILATERAL HAND PAIN: ICD-10-CM

## 2025-02-18 DIAGNOSIS — M18.0 ARTHRITIS OF CARPOMETACARPAL (CMC) JOINT OF BOTH THUMBS: ICD-10-CM

## 2025-02-18 LAB
APPEARANCE FLD: NORMAL
BODY FLD TYPE: NORMAL
BODY FLD TYPE: NORMAL
COLOR FLD: YELLOW
CRYSTALS FLD MICRO: NEGATIVE
LYMPHOCYTES NFR FLD MANUAL: 1 %
MONOS+MACROS NFR FLD MANUAL: 10 %
NEUTROPHILS NFR FLD MANUAL: 89 %
WBC # FLD: NORMAL /CU MM

## 2025-02-18 PROCEDURE — 87102 FUNGUS ISOLATION CULTURE: CPT | Performed by: ORTHOPAEDIC SURGERY

## 2025-02-18 PROCEDURE — 89051 BODY FLUID CELL COUNT: CPT | Performed by: ORTHOPAEDIC SURGERY

## 2025-02-18 PROCEDURE — 87075 CULTR BACTERIA EXCEPT BLOOD: CPT | Performed by: ORTHOPAEDIC SURGERY

## 2025-02-18 PROCEDURE — 87205 SMEAR GRAM STAIN: CPT | Performed by: ORTHOPAEDIC SURGERY

## 2025-02-18 PROCEDURE — 87070 CULTURE OTHR SPECIMN AEROBIC: CPT | Performed by: ORTHOPAEDIC SURGERY

## 2025-02-18 PROCEDURE — 73130 X-RAY EXAM OF HAND: CPT | Mod: TC,LT

## 2025-02-18 PROCEDURE — 89060 EXAM SYNOVIAL FLUID CRYSTALS: CPT | Performed by: ORTHOPAEDIC SURGERY

## 2025-02-18 RX ORDER — LIDOCAINE 50 MG/G
1 PATCH TOPICAL DAILY
Qty: 30 PATCH | Refills: 0 | Status: SHIPPED | OUTPATIENT
Start: 2025-02-18

## 2025-02-18 RX ORDER — CLINDAMYCIN HYDROCHLORIDE 300 MG/1
300 CAPSULE ORAL 3 TIMES DAILY
Qty: 15 CAPSULE | Refills: 0 | Status: SHIPPED | OUTPATIENT
Start: 2025-02-18 | End: 2025-02-23

## 2025-02-18 NOTE — PROGRESS NOTES
Subjective:     Patient ID: Shireen Felix is a 79 y.o. female.    Chief Complaint: Pain and Swelling of the Left Hand      HPI:  The patient is an 79-year-old female status post left thumb trigger finger injection bilateral basal joint injection bilateral carpal tunnel injection 11/08/2024.  She did quite well until 2 days ago.  She was in the hospital for a month with COVID.  She did not have an IV on that hand.  She apparently does have a history of gout documented [facility] last year although I can not find evidence of uric acid levels tested or pathology report to document that.  She has a creatinine of 1.4.  She developed redness swelling and pain basal joint left thumb 2 days ago.  She said she has a gout that X like that in the same area.    Past Medical History:   Diagnosis Date    Abnormal ECG 08/05/2019    Acute bronchitis and bronchiolitis 12/31/2024    Aneurysm     Anticoagulant long-term use     Arthritis     CAD in native artery 08/05/2019    Diabetes mellitus     Fall 10/10/2019    Formatting of this note might be different from the original. Found sitting on floor next to bed last night Mild confusion today Does not recall falling or how she ended up on floor UA today Labs yesterday unremarkable    Glaucoma     Hypertension     Pulmonary fibrosis     Seizures     Shingles 05/27/2017    Stroke      Past Surgical History:   Procedure Laterality Date    BRAIN SURGERY      CARDIAC CATHETERIZATION      CATHETERIZATION OF BOTH LEFT AND RIGHT HEART N/A 5/17/2024    Procedure: CATHETERIZATION, HEART, BOTH LEFT AND RIGHT;  Surgeon: Rachelle Sarabia MD;  Location: [facility] CATH LAB;  Service: Cardiology;  Laterality: N/A;    COLONOSCOPY N/A 09/21/2023    Procedure: COLONOSCOPY;  Surgeon: Joanna Leal MD;  Location: [facility] ENDO;  Service: Endoscopy;  Laterality: N/A;    CORONARY ANGIOPLASTY      EPIDURAL STEROID INJECTION INTO CERVICAL SPINE N/A 2/7/2024    Procedure: Cervical IL XANDER,  Level C6/7, RN IV sedation;  Surgeon: Francisco Oshea MD;  Location: [facility] PAIN MGT;  Service: Pain Management;  Laterality: N/A;    EPIDURAL STEROID INJECTION INTO LUMBAR SPINE Bilateral 11/12/2019    Procedure: TF XANDER L4/5;  Surgeon: Desean Dias MD;  Location: [facility] PAIN MGT;  Service: Pain Management;  Laterality: Bilateral;    HYSTERECTOMY      INJECTION OF ANESTHETIC AGENT AROUND MEDIAL BRANCH NERVES INNERVATING LUMBAR FACET JOINT Bilateral 01/31/2020    Procedure: Bilateral L3-5 MBB;  Surgeon: Desean Dias MD;  Location: [facility] PAIN MGT;  Service: Pain Management;  Laterality: Bilateral;    INJECTION OF ANESTHETIC AGENT INTO SACROILIAC JOINT Right 11/16/2021    Procedure: Right BLOCK, SACROILIAC JOINT Right GTB with RN IV sedation;  Surgeon: Yao Fulton MD;  Location: [facility] PAIN MGT;  Service: Pain Management;  Laterality: Right;    INJECTION OF ANESTHETIC AGENT INTO SACROILIAC JOINT Bilateral 07/28/2023    Procedure: Bilateral GT bursa + bilateral SIJ injection;  Surgeon: Francisco Oshea MD;  Location: [facility] PAIN MGT;  Service: Pain Management;  Laterality: Bilateral;    INJECTION OF JOINT Bilateral 07/09/2020    Procedure: Bilateral shoulder GH Joint injection with local;  Surgeon: Desean Dias MD;  Location: [facility] PAIN MGT;  Service: Pain Management;  Laterality: Bilateral;    INJECTION OF JOINT Bilateral 07/28/2023    Procedure: Bilateral GT bursa + bilateral SIJ injection NEEDS ADDITIONAL SEDATION AND MORE TIME BEFORE INJECTION RN IV Sedation;  Surgeon: Francisco Oshea MD;  Location: [facility] PAIN MGT;  Service: Pain Management;  Laterality: Bilateral;    INJECTION OF JOINT N/A 10/25/2023    Procedure: Sacrococcygeal joint injection;  Surgeon: Francisco Oshea MD;  [facility];  Service: Pain Management;  Laterality: N/A;    OOPHORECTOMY      SELECTIVE INJECTION OF ANESTHETIC AGENT AROUND LUMBAR SPINAL NERVE ROOT BY TRANSFORAMINAL APPROACH Bilateral 04/26/2023    Procedure: Bilateral L4/5 TF  XANDER RN IV Sedation;  Surgeon: Francisco Oshea MD;  Location: [facility] PAIN MGT;  Service: Pain Management;  Laterality: Bilateral;    TRANSFORAMINAL EPIDURAL INJECTION OF STEROID Bilateral 07/23/2019    Procedure: Bilateral L3/4 Transforaminal Epidural Steroid Injection;  Surgeon: Desean Dias MD;  Location: [facility] PAIN MGT;  Service: Pain Management;  Laterality: Bilateral;    TRANSFORAMINAL EPIDURAL INJECTION OF STEROID Bilateral 03/10/2020    Procedure: Right T12/L1 TF XANDER with local;  Surgeon: Desean Dias MD;  Location: [facility] PAIN MGT;  Service: Pain Management;  Laterality: Bilateral;    TRANSFORAMINAL EPIDURAL INJECTION OF STEROID Right 06/19/2020    Procedure: Right T12/L1 TFESI Covid day of procedure;  Surgeon: Desean Dias MD;  Location: [facility] PAIN MGT;  Service: Pain Management;  Laterality: Right;     Family History   Problem Relation Name Age of Onset    No Known Problems Mother      No Known Problems Father      Breast cancer Maternal Aunt      Breast cancer Maternal Aunt      Breast cancer Maternal Aunt       Social History[1]  Medication List with Changes/Refills   New Medications    CLINDAMYCIN (CLEOCIN) 300 MG CAPSULE    Take 1 capsule (300 mg total) by mouth 3 (three) times daily. for 15 doses    LIDOCAINE (LIDODERM) 5 %    Place 1 patch onto the skin once daily. Remove & Discard patch within 12 hours or as directed by MD   Current Medications    ALBUTEROL (PROVENTIL) 2.5 MG /3 ML (0.083 %) NEBULIZER SOLUTION    Take 3 mLs (2.5 mg total) by nebulization every 4 (four) hours as needed for Wheezing. Rescue    ALBUTEROL-BUDESONIDE (AIRSUPRA) 90-80 MCG/ACTUATION    Inhale 2 puffs into the lungs every 4 (four) hours as needed (wheezing, cough).    ALPRAZOLAM (XANAX) 0.25 MG TABLET    Take 1 tablet (0.25 mg total) by mouth 2 (two) times daily as needed for Anxiety.    ATORVASTATIN (LIPITOR) 40 MG TABLET    Take 1 tablet (40 mg total) by mouth once daily.    BENZONATATE (TESSALON) 200 MG CAPSULE     Take 1 capsule (200 mg total) by mouth 3 (three) times daily as needed for Cough. .    BISOPROLOL (ZEBETA) 5 MG TABLET    Take 1 tablet (5 mg total) by mouth once daily.    BLOOD SUGAR DIAGNOSTIC STRP    To check BG 1 times daily, to use with insurance preferred meter    BLOOD-GLUCOSE METER KIT    To check BG 1 times daily, to use with insurance preferred meter    CHOLECALCIFEROL, VITAMIN D3, 1,250 MCG (50,000 UNIT) CAPSULE    Take 1 capsule (50,000 Units total) by mouth every 7 days.    CHOLESTYRAMINE (QUESTRAN) 4 GRAM PACKET    TAKE 1 PACKET BY MOUTH TWICE DAILY AS DIRECTED    CLOPIDOGREL (PLAVIX) 75 MG TABLET    Take 1 tablet (75 mg total) by mouth once daily.    CROMOLYN (NASALCROM) 5.2 MG/SPRAY (4 %) NASAL SPRAY    1 spray by Nasal route 4 (four) times daily.    DENOSUMAB (PROLIA) 60 MG/ML SYRG    every 6 (six) months.    DEXTROMETHORPHAN-GUAIFENESIN  MG (MUCINEX DM)  MG PER 12 HR TABLET    Take 1 tablet by mouth 2 (two) times daily.    DICYCLOMINE (BENTYL) 10 MG CAPSULE    Take 1 capsule (10 mg total) by mouth 2 (two) times daily.    EMPAGLIFLOZIN (JARDIANCE) 10 MG TABLET    Take 1 tablet (10 mg total) by mouth once daily.    ESCITALOPRAM OXALATE (LEXAPRO) 20 MG TABLET    Take 1 tablet (20 mg total) by mouth once daily.    FLUOCINONIDE (LIDEX) 0.05 % EXTERNAL SOLUTION    Apply topically 2 (two) times daily. Use on itchy spots on scalp and ear    FLUTICASONE PROPIONATE (FLONASE) 50 MCG/ACTUATION NASAL SPRAY    2 sprays (100 mcg total) by Each Nostril route once daily.    FLUTICASONE-UMECLIDIN-VILANTER (TRELEGY ELLIPTA) 200-62.5-25 MCG INHALER    Inhale 1 puff into the lungs once daily.    FUROSEMIDE (LASIX) 20 MG TABLET    Take 1 tablet (20 mg total) by mouth daily as needed (for edema, swelling, fluid build up, or 3 pound weight gain in 24 hours).    GABAPENTIN (NEURONTIN) 300 MG CAPSULE    Take 1 capsule (300 mg total) by mouth 2 (two) times daily. .    HYDROXYZINE HCL (ATARAX) 25 MG TABLET     Take 2 tablets (50 mg total) by mouth 2 (two) times daily as needed for Itching.    IPRATROPIUM (ATROVENT) 42 MCG (0.06 %) NASAL SPRAY    2 sprays by Each Nostril route 2 (two) times daily.    ISOSORBIDE MONONITRATE (IMDUR) 120 MG 24 HR TABLET    Take 1 tablet (120 mg total) by mouth every evening.    KETOCONAZOLE (NIZORAL) 2 % CREAM    Apply topically 2 (two) times daily. For dry flaky patches of ear.    LANCETS MISC    To check BG 1 times daily, to use with insurance preferred meter    LATANOPROST 0.005 % OPHTHALMIC SOLUTION    Place 1 drop into both eyes every evening.    LEVETIRACETAM (KEPPRA) 1000 MG TABLET    Take 1 tablet (1,000 mg total) by mouth 2 (two) times daily.    LEVOFLOXACIN (LEVAQUIN) 500 MG TABLET    Take 1 tablet (500 mg total) by mouth once daily. for 21 days    MAGNESIUM OXIDE (MAG-OX) 400 MG (241.3 MG MAGNESIUM) TABLET    Take 1 tablet (400 mg total) by mouth once daily.    MONTELUKAST (SINGULAIR) 10 MG TABLET    Take 1 tablet (10 mg total) by mouth once daily.    MUCUS CLEARING DEVICE (AEROBIKA OSCILLATING PEP SYSTM)    by Misc.(Non-Drug; Combo Route) route 4 (four) times daily.    NITROGLYCERIN (NITROSTAT) 0.4 MG SL TABLET    Place 1 tablet (0.4 mg total) under the tongue every 5 (five) minutes as needed for Chest pain.    OMEPRAZOLE (PRILOSEC) 40 MG CAPSULE    Take 1 capsule (40 mg total) by mouth every morning.    PANTOPRAZOLE (PROTONIX) 40 MG TABLET    TAKE 1 TABLET BY MOUTH TWICE DAILY    POTASSIUM CHLORIDE SA (K-DUR,KLOR-CON) 20 MEQ TABLET    Take 1 tablet (20 mEq total) by mouth once daily. Take extra dose with Lasix - fluid pill    PRAMOXINE-HYDROCORTISONE (ANALPRAM HC) CREAM    Apply topically 3 (three) times daily.    PROMETHAZINE-DEXTROMETHORPHAN (PROMETHAZINE-DM) 6.25-15 MG/5 ML SYRP    Take 5 mLs by mouth every 8 (eight) hours as needed (Cough). .    RANOLAZINE (RANEXA) 1,000 MG TB12    Take 1 tablet (1,000 mg total) by mouth 2 (two) times daily.    ROFLUMILAST (DALIRESP) 500 MCG  TAB    Take 1 tablet (500 mcg total) by mouth once daily.    SACUBITRIL-VALSARTAN (ENTRESTO) 24-26 MG PER TABLET    Take 1 tablet by mouth 2 (two) times daily.    SODIUM CHLORIDE (OCEAN) 0.65 % NASAL SPRAY    1 spray by Nasal route 3 (three) times daily. .    SODIUM CHLORIDE 3% 3 % NEBULIZER SOLUTION    Take 4 mLs by nebulization as needed for Cough (chest congestion).    SPIRONOLACTONE (ALDACTONE) 50 MG TABLET    Take 1 tablet (50 mg total) by mouth once daily.    TEZEPELUMAB-EKKO 210 MG/1.91 ML (110 MG/ML) PNIJ    Inject 210 mg into the skin every 28 days.    TIZANIDINE (ZANAFLEX) 4 MG TABLET    Take 1 tablet (4 mg total) by mouth every 12 (twelve) hours.    TRIAMCINOLONE ACETONIDE 0.025% (KENALOG) 0.025 % CREAM    Apply topically 2 (two) times daily. PRN rash and itching of ear. Mild steroid cream.     Review of patient's allergies indicates:   Allergen Reactions    Penicillins Anaphylaxis, Hives, Shortness Of Breath and Swelling    Penicillin     Adhesive Rash    Doxycycline Nausea Only    Oxycodone Other (See Comments)     Fatigue      Sulfa (sulfonamide antibiotics) Itching     Review of Systems   Constitutional: Positive for malaise/fatigue.   HENT:  Positive for hearing loss.    Eyes:  Negative for double vision and visual disturbance.   Cardiovascular:  Positive for chest pain, irregular heartbeat and syncope.   Respiratory:  Positive for shortness of breath.    Endocrine: Negative for cold intolerance.   Hematologic/Lymphatic: Does not bruise/bleed easily.   Skin:  Negative for poor wound healing and suspicious lesions.   Musculoskeletal:  Positive for arthritis, back pain, falls, gout, joint pain, joint swelling and neck pain.   Gastrointestinal:  Positive for constipation, diarrhea and heartburn. Negative for nausea and vomiting.   Genitourinary:  Positive for frequency. Negative for dysuria.   Neurological:  Positive for loss of balance, numbness, paresthesias, seizures, sensory change and vertigo.    Psychiatric/Behavioral:  Positive for depression and substance abuse. Negative for memory loss. The patient has insomnia. The patient is not nervous/anxious.    Allergic/Immunologic: Negative for persistent infections.       Objective:   Body mass index is 18.54 kg/m².  There were no vitals filed for this visit.             General    Constitutional: She is oriented to person, place, and time. She appears well-developed and well-nourished. No distress.   HENT:   Head: Normocephalic.   Eyes: EOM are normal.   Pulmonary/Chest: Effort normal.   Neurological: She is oriented to person, place, and time.   Psychiatric: She has a normal mood and affect.         Left Hand/Wrist Exam     Inspection   Scars: Wrist - absent Hand -  absent  Effusion: Wrist - absent Hand -  present    Pain   Wrist - The patient exhibits pain of the CMC.    Other     Sensory Exam  Median Distribution: normal  Ulnar Distribution: normal  Radial Distribution: normal    Comments:  The patient has redness swelling and pain basal joint left thumb.  She has no lymphangitis lymphadenopathy.          Vascular Exam       Capillary Refill  Left Hand: normal capillary refill          Relevant imaging results reviewed and interpreted by me, discussed with the patient and / or family today radiographs left thumb showed severe basal joint arthritis worse over the last year and a half with questionable bone loss trapezius  Assessment:     Encounter Diagnoses   Name Primary?    Bilateral hand pain Yes    Arthritis of carpometacarpal (CMC) joint of both thumbs         Plan:     The patient had the left thumb basal joint aspirated with an 18 gauge needle and 5 cc syringe yielding about 1 cc of clear synovial fluid.  This was sent for cell count differential, culture, crystals, and Gram stain.  She was sent for a serum uric acid level.  She was sent for rheumatologic workup including sed rate C-reactive protein rheumatoid factor and antinuclear antibody.  She  will return in 3 days.  She was given empirically clindamycin 500 mg p.o. b.i.d..  I called Dr. Andrez galarza about her.  She will return in 3 days to follow her progress.  She was given live cane patch for pain relief.  She can not tolerate a thumb spica splint.  If she does have uric acid positivity we will treat her with colchicine and allopurinol.                Disclaimer: This note was prepared using a voice recognition system and is likely to have sound alike errors within the text.          [1]   Social History  Socioeconomic History    Marital status: Single   Tobacco Use    Smoking status: Former     Current packs/day: 0.00     Average packs/day: 1 pack/day for 42.3 years (42.3 ttl pk-yrs)     Types: Cigarettes     Start date:      Quit date: 2004     Years since quittin.8    Smokeless tobacco: Former   Substance and Sexual Activity    Alcohol use: No    Drug use: Never    Sexual activity: Not Currently     Partners: Male   Social History Narrative    No pets or smokers in household.     Social Drivers of Health     Financial Resource Strain: Low Risk  (2025)    Overall Financial Resource Strain (CARDIA)     Difficulty of Paying Living Expenses: Not very hard   Food Insecurity: No Food Insecurity (2025)    Hunger Vital Sign     Worried About Running Out of Food in the Last Year: Never true     Ran Out of Food in the Last Year: Never true   Transportation Needs: No Transportation Needs (2025)    TRANSPORTATION NEEDS     Transportation : No   Physical Activity: Unknown (2024)    Exercise Vital Sign     Days of Exercise per Week: 3 days   Recent Concern: Physical Activity - Inactive (2024)    Received from [facility] Subsidiaries and Affiliates    Exercise Vital Sign     Days of Exercise per Week: 0 days     Minutes of Exercise per Session: 0 min   Stress: No Stress Concern Present (2025)    [place] [employer] - Occupational Stress Questionnaire     Feeling of Stress : Only a little   Housing Stability: Unknown (1/2/2025)    Housing Stability Vital Sign     Unable to Pay for Housing in the Last Year: No     Homeless in the Last Year: No

## 2025-02-18 NOTE — PROCEDURES
Small Joint Aspiration/Injection: L thumb CMC    Date/Time: 2/18/2025 1:30 PM    Performed by: Rizwan Navarro MD  Authorized by: Rizwan Navarro MD    Consent Done?:  Yes (Verbal)  Indications:  Pain  Site marked: the procedure site was marked    Timeout: prior to procedure the correct patient, procedure, and site was verified    Prep: patient was prepped and draped in usual sterile fashion      Local anesthesia used?: No    Location:  Thumb  Site:  L thumb CMC  Ultrasonic guidance for needle placement?: No    Needle size:  25 G  Approach:  Dorsal

## 2025-02-19 ENCOUNTER — TELEPHONE (OUTPATIENT)
Dept: ORTHOPEDICS | Facility: CLINIC | Age: 80
End: 2025-02-19
Payer: MEDICARE

## 2025-02-19 LAB
GRAM STN SPEC: NORMAL
GRAM STN SPEC: NORMAL
PATH INTERP FLD-IMP: NORMAL

## 2025-02-19 NOTE — TELEPHONE ENCOUNTER
Spoke to the bother of the patient an let him know that Dr. Navarro stated that she does not have infection or gout. An to also stop taking the antibiotics an that we will see her on Friday he verbalized understanding an stated that he will let her know everything.       ----- Message from Nurse Mcfadden sent at 2/19/2025  2:07 PM CST -----    ----- Message -----  From: Gianni Bonner  Sent: 2/19/2025   1:18 PM CST  To: [facility] Staff    Type:  Needs Medical AdviceWho Called:  IZABELLA SHEA [[id number]]Symptoms (please be specific):  How long has patient had these symptoms:  Pharmacy name and phone #:  Would the patient rather a call back or a response via [facility]? Best Call Back Number:  [phone] Information: Patient called in regards to patient hand hurting

## 2025-02-21 ENCOUNTER — OFFICE VISIT (OUTPATIENT)
Dept: ORTHOPEDICS | Facility: CLINIC | Age: 80
End: 2025-02-21
Payer: MEDICARE

## 2025-02-21 VITALS — WEIGHT: 108 LBS | HEIGHT: 64 IN | BODY MASS INDEX: 18.44 KG/M2

## 2025-02-21 DIAGNOSIS — M65.30 TRIGGER FINGER, ACQUIRED: ICD-10-CM

## 2025-02-21 DIAGNOSIS — M18.0 ARTHRITIS OF CARPOMETACARPAL (CMC) JOINT OF BOTH THUMBS: ICD-10-CM

## 2025-02-21 DIAGNOSIS — G56.03 BILATERAL CARPAL TUNNEL SYNDROME: Primary | ICD-10-CM

## 2025-02-21 LAB — BACTERIA SPEC ANAEROBE CULT: NORMAL

## 2025-02-21 PROCEDURE — 99999 PR PBB SHADOW E&M-EST. PATIENT-LVL IV: CPT | Mod: PBBFAC,,, | Performed by: ORTHOPAEDIC SURGERY

## 2025-02-21 RX ORDER — TRIAMCINOLONE ACETONIDE 40 MG/ML
40 INJECTION, SUSPENSION INTRA-ARTICULAR; INTRAMUSCULAR
Status: DISCONTINUED | OUTPATIENT
Start: 2025-02-21 | End: 2025-02-21 | Stop reason: HOSPADM

## 2025-02-21 RX ADMIN — TRIAMCINOLONE ACETONIDE 40 MG: 40 INJECTION, SUSPENSION INTRA-ARTICULAR; INTRAMUSCULAR at 11:02

## 2025-02-21 NOTE — PROCEDURES
Carpal Tunnel: R carpal tunnel, L carpal tunnel    Date/Time: 2/21/2025 11:45 AM    Performed by: Rizwan Navarro MD  Authorized by: Rizwan Navarro MD    Consent Done?:  Yes (Verbal)  Indications:  Pain  Timeout: prior to procedure the correct patient, procedure, and site was verified    Prep: patient was prepped and draped in usual sterile fashion      Local anesthesia used?: Yes    Local anesthetic:  Lidocaine 2% without epinephrine  Anesthetic total (ml):  2    Location:  Wrist  Site:  R carpal tunnel and L carpal tunnel  Ultrasonic Guidance for Needle Placement?: No    Needle size:  25 G  Approach:  Volar  Medications:  40 mg triamcinolone acetonide 40 mg/mL; 40 mg triamcinolone acetonide 40 mg/mL

## 2025-02-21 NOTE — PROCEDURES
Small Joint Aspiration/Injection: R thumb CMC, L thumb CMC    Date/Time: 2/21/2025 11:45 AM    Performed by: Rizwan Navarro MD  Authorized by: Rizwan Navarro MD    Consent Done?:  Yes (Verbal)  Indications:  Pain  Site marked: the procedure site was marked    Timeout: prior to procedure the correct patient, procedure, and site was verified    Prep: patient was prepped and draped in usual sterile fashion      Local anesthesia used?: Yes    Local anesthetic:  Lidocaine 2% without epinephrine  Anesthetic total (ml):  1    Location:  Thumb  Site:  R thumb CMC and L thumb CMC  Ultrasonic guidance for needle placement?: No    Needle size:  25 G  Approach:  Dorsal  Medications:  40 mg triamcinolone acetonide 40 mg/mL; 40 mg triamcinolone acetonide 40 mg/mL

## 2025-02-21 NOTE — PROCEDURES
Tendon Sheath    Date/Time: 2/21/2025 11:45 AM    Performed by: Rizwan Navarro MD  Authorized by: Riwzan Navarro MD    Consent Done?:  Yes (Verbal)  Indications:  Pain  Timeout: prior to procedure the correct patient, procedure, and site was verified    Prep: patient was prepped and draped in usual sterile fashion      Local anesthesia used?: Yes    Local anesthetic:  Lidocaine 2% without epinephrine  Anesthetic total (ml):  0.5    Location:  Thumb  Site:  L thumb flexor tendon sheath  Ultrasonic guidance for needle placement?: No    Needle size:  25 G  Approach:  Volar  Medications:  40 mg triamcinolone acetonide 40 mg/mL

## 2025-02-21 NOTE — PROGRESS NOTES
Subjective:     Patient ID: Shireen Felix is a 79 y.o. female.    Chief Complaint: Pain and Swelling of the Left Hand      HPI:  The patient is an 79-year-old female with bilateral carpal tunnel syndrome as well as bilateral thumb basal joint arthritis and a left trigger thumb that she wishes to have injected.  She was last injected 11/08/2024 with good relief until recently.  She was seen 02/18/2025, 3 days ago and had aspiration of the left thumb basal joint because of the appearance however there was no uric acid crystals and cell count was normal and there was no evidence of infection.  The fluid was clear.  I had called her the next day and told her to stop the clindamycin and to come in today for injection as previously scheduled.  She is not a surgical candidate because of her multiple medical issues.  She had positive nerve conduction studies 02/24/2023.    Past Medical History:   Diagnosis Date    Abnormal ECG 08/05/2019    Acute bronchitis and bronchiolitis 12/31/2024    Aneurysm     Anticoagulant long-term use     Arthritis     CAD in native artery 08/05/2019    Diabetes mellitus     Fall 10/10/2019    Formatting of this note might be different from the original. Found sitting on floor next to bed last night Mild confusion today Does not recall falling or how she ended up on floor UA today Labs yesterday unremarkable    Glaucoma     Hypertension     Pulmonary fibrosis     Seizures     Shingles 05/27/2017    Stroke      Past Surgical History:   Procedure Laterality Date    BRAIN SURGERY      CARDIAC CATHETERIZATION      CATHETERIZATION OF BOTH LEFT AND RIGHT HEART N/A 5/17/2024    Procedure: CATHETERIZATION, HEART, BOTH LEFT AND RIGHT;  Surgeon: Rachelle Sarabia MD;  Location: [facility] CATH LAB;  Service: Cardiology;  Laterality: N/A;    COLONOSCOPY N/A 09/21/2023    Procedure: COLONOSCOPY;  Surgeon: Joanna Leal MD;  Location: [facility] ENDO;  Service: Endoscopy;  Laterality: N/A;    CORONARY  ANGIOPLASTY      EPIDURAL STEROID INJECTION INTO CERVICAL SPINE N/A 2/7/2024    Procedure: Cervical IL XANDER, Level C6/7, RN IV sedation;  Surgeon: Francisco Oshea MD;  Location: [facility] PAIN MGT;  Service: Pain Management;  Laterality: N/A;    EPIDURAL STEROID INJECTION INTO LUMBAR SPINE Bilateral 11/12/2019    Procedure: TF XANEDR L4/5;  Surgeon: Desean Dias MD;  Location: [facility] PAIN MGT;  Service: Pain Management;  Laterality: Bilateral;    HYSTERECTOMY      INJECTION OF ANESTHETIC AGENT AROUND MEDIAL BRANCH NERVES INNERVATING LUMBAR FACET JOINT Bilateral 01/31/2020    Procedure: Bilateral L3-5 MBB;  Surgeon: Desean Dias MD;  [facility];  Service: Pain Management;  Laterality: Bilateral;    INJECTION OF ANESTHETIC AGENT INTO SACROILIAC JOINT Right 11/16/2021    Procedure: Right BLOCK, SACROILIAC JOINT Right GTB with RN IV sedation;  Surgeon: Yao Fulton MD;  Location: [facility] PAIN MGT;  Service: Pain Management;  Laterality: Right;    INJECTION OF ANESTHETIC AGENT INTO SACROILIAC JOINT Bilateral 07/28/2023    Procedure: Bilateral GT bursa + bilateral SIJ injection;  Surgeon: Francisco Oshea MD;  [facility];  Service: Pain Management;  Laterality: Bilateral;    INJECTION OF JOINT Bilateral 07/09/2020    Procedure: Bilateral shoulder GH Joint injection with local;  Surgeon: Desean Dias MD;  Location: [facility] PAIN MGT;  Service: Pain Management;  Laterality: Bilateral;    INJECTION OF JOINT Bilateral 07/28/2023    Procedure: Bilateral GT bursa + bilateral SIJ injection NEEDS ADDITIONAL SEDATION AND MORE TIME BEFORE INJECTION RN IV Sedation;  Surgeon: Francisco Oshea MD;  Location: [facility] PAIN MGT;  Service: Pain Management;  Laterality: Bilateral;    INJECTION OF JOINT N/A 10/25/2023    Procedure: Sacrococcygeal joint injection;  Surgeon: Francisco Oshea MD;  Location: [facility] PAIN MGT;  Service: Pain Management;  Laterality: N/A;    OOPHORECTOMY      SELECTIVE INJECTION OF ANESTHETIC AGENT  AROUND LUMBAR SPINAL NERVE ROOT BY TRANSFORAMINAL APPROACH Bilateral 04/26/2023    Procedure: Bilateral L4/5 TF XANDER RN IV Sedation;  Surgeon: Francisco Oshea MD;  Location: [facility] PAIN MGT;  Service: Pain Management;  Laterality: Bilateral;    TRANSFORAMINAL EPIDURAL INJECTION OF STEROID Bilateral 07/23/2019    Procedure: Bilateral L3/4 Transforaminal Epidural Steroid Injection;  Surgeon: Desean Dias MD;  Location: [facility] PAIN [facility]GT;  Service: Pain Management;  Laterality: Bilateral;    TRANSFORAMINAL EPIDURAL INJECTION OF STEROID Bilateral 03/10/2020    Procedure: Right T12/L1 TF XANDER with local;  Surgeon: Desean Dias MD;  Location: [facility] PAIN MGT;  Service: Pain Management;  Laterality: Bilateral;    TRANSFORAMINAL EPIDURAL INJECTION OF STEROID Right 06/19/2020    Procedure: Right T12/L1 TFESI Covid day of procedure;  Surgeon: Desean Dias MD;  Location: [facility] PAIN MGT;  Service: Pain Management;  Laterality: Right;     Family History   Problem Relation Name Age of Onset    No Known Problems Mother      No Known Problems Father      Breast cancer Maternal Aunt      Breast cancer Maternal Aunt      Breast cancer Maternal Aunt       Social History[1]  Medication List with Changes/Refills   Current Medications    ALBUTEROL (PROVENTIL) 2.5 MG /3 ML (0.083 %) NEBULIZER SOLUTION    Take 3 mLs (2.5 mg total) by nebulization every 4 (four) hours as needed for Wheezing. Rescue    ALBUTEROL-BUDESONIDE (AIRSUPRA) 90-80 MCG/ACTUATION    Inhale 2 puffs into the lungs every 4 (four) hours as needed (wheezing, cough).    ALPRAZOLAM (XANAX) 0.25 MG TABLET    Take 1 tablet (0.25 mg total) by mouth 2 (two) times daily as needed for Anxiety.    ATORVASTATIN (LIPITOR) 40 MG TABLET    Take 1 tablet (40 mg total) by mouth once daily.    BENZONATATE (TESSALON) 200 MG CAPSULE    Take 1 capsule (200 mg total) by mouth 3 (three) times daily as needed for Cough. .    BISOPROLOL (ZEBETA) 5 MG TABLET    Take 1 tablet (5 mg total) by mouth  once daily.    BLOOD SUGAR DIAGNOSTIC STRP    To check BG 1 times daily, to use with insurance preferred meter    BLOOD-GLUCOSE METER KIT    To check BG 1 times daily, to use with insurance preferred meter    CHOLECALCIFEROL, VITAMIN D3, 1,250 MCG (50,000 UNIT) CAPSULE    Take 1 capsule (50,000 Units total) by mouth every 7 days.    CHOLESTYRAMINE (QUESTRAN) 4 GRAM PACKET    TAKE 1 PACKET BY MOUTH TWICE DAILY AS DIRECTED    CLINDAMYCIN (CLEOCIN) 300 MG CAPSULE    Take 1 capsule (300 mg total) by mouth 3 (three) times daily. for 15 doses    CLOPIDOGREL (PLAVIX) 75 MG TABLET    Take 1 tablet (75 mg total) by mouth once daily.    CROMOLYN (NASALCROM) 5.2 MG/SPRAY (4 %) NASAL SPRAY    1 spray by Nasal route 4 (four) times daily.    DENOSUMAB (PROLIA) 60 MG/ML SYRG    every 6 (six) months.    DEXTROMETHORPHAN-GUAIFENESIN  MG (MUCINEX DM)  MG PER 12 HR TABLET    Take 1 tablet by mouth 2 (two) times daily.    DICYCLOMINE (BENTYL) 10 MG CAPSULE    Take 1 capsule (10 mg total) by mouth 2 (two) times daily.    EMPAGLIFLOZIN (JARDIANCE) 10 MG TABLET    Take 1 tablet (10 mg total) by mouth once daily.    ESCITALOPRAM OXALATE (LEXAPRO) 20 MG TABLET    Take 1 tablet (20 mg total) by mouth once daily.    FLUOCINONIDE (LIDEX) 0.05 % EXTERNAL SOLUTION    Apply topically 2 (two) times daily. Use on itchy spots on scalp and ear    FLUTICASONE PROPIONATE (FLONASE) 50 MCG/ACTUATION NASAL SPRAY    2 sprays (100 mcg total) by Each Nostril route once daily.    FLUTICASONE-UMECLIDIN-VILANTER (TRELEGY ELLIPTA) 200-62.5-25 MCG INHALER    Inhale 1 puff into the lungs once daily.    FUROSEMIDE (LASIX) 20 MG TABLET    Take 1 tablet (20 mg total) by mouth daily as needed (for edema, swelling, fluid build up, or 3 pound weight gain in 24 hours).    GABAPENTIN (NEURONTIN) 300 MG CAPSULE    Take 1 capsule (300 mg total) by mouth 2 (two) times daily. .    HYDROXYZINE HCL (ATARAX) 25 MG TABLET    Take 2 tablets (50 mg total) by mouth 2  (two) times daily as needed for Itching.    IPRATROPIUM (ATROVENT) 42 MCG (0.06 %) NASAL SPRAY    2 sprays by Each Nostril route 2 (two) times daily.    ISOSORBIDE MONONITRATE (IMDUR) 120 MG 24 HR TABLET    Take 1 tablet (120 mg total) by mouth every evening.    KETOCONAZOLE (NIZORAL) 2 % CREAM    Apply topically 2 (two) times daily. For dry flaky patches of ear.    LANCETS MISC    To check BG 1 times daily, to use with insurance preferred meter    LATANOPROST 0.005 % OPHTHALMIC SOLUTION    Place 1 drop into both eyes every evening.    LEVETIRACETAM (KEPPRA) 1000 MG TABLET    Take 1 tablet (1,000 mg total) by mouth 2 (two) times daily.    LEVOFLOXACIN (LEVAQUIN) 500 MG TABLET    Take 1 tablet (500 mg total) by mouth once daily. for 21 days    LIDOCAINE (LIDODERM) 5 %    Place 1 patch onto the skin once daily. Remove & Discard patch within 12 hours or as directed by MD    MAGNESIUM OXIDE (MAG-OX) 400 MG (241.3 MG MAGNESIUM) TABLET    Take 1 tablet (400 mg total) by mouth once daily.    MONTELUKAST (SINGULAIR) 10 MG TABLET    Take 1 tablet (10 mg total) by mouth once daily.    MUCUS CLEARING DEVICE (AEROBIKA OSCILLATING PEP SYSTM)    by Misc.(Non-Drug; Combo Route) route 4 (four) times daily.    NITROGLYCERIN (NITROSTAT) 0.4 MG SL TABLET    Place 1 tablet (0.4 mg total) under the tongue every 5 (five) minutes as needed for Chest pain.    OMEPRAZOLE (PRILOSEC) 40 MG CAPSULE    Take 1 capsule (40 mg total) by mouth every morning.    PANTOPRAZOLE (PROTONIX) 40 MG TABLET    TAKE 1 TABLET BY MOUTH TWICE DAILY    POTASSIUM CHLORIDE SA (K-DUR,KLOR-CON) 20 MEQ TABLET    Take 1 tablet (20 mEq total) by mouth once daily. Take extra dose with Lasix - fluid pill    PRAMOXINE-HYDROCORTISONE (ANALPRAM HC) CREAM    Apply topically 3 (three) times daily.    PROMETHAZINE-DEXTROMETHORPHAN (PROMETHAZINE-DM) 6.25-15 MG/5 ML SYRP    Take 5 mLs by mouth every 8 (eight) hours as needed (Cough). .    RANOLAZINE (RANEXA) 1,000 MG TB12    Take  1 tablet (1,000 mg total) by mouth 2 (two) times daily.    ROFLUMILAST (DALIRESP) 500 MCG TAB    Take 1 tablet (500 mcg total) by mouth once daily.    SACUBITRIL-VALSARTAN (ENTRESTO) 24-26 MG PER TABLET    Take 1 tablet by mouth 2 (two) times daily.    SODIUM CHLORIDE (OCEAN) 0.65 % NASAL SPRAY    1 spray by Nasal route 3 (three) times daily. .    SODIUM CHLORIDE 3% 3 % NEBULIZER SOLUTION    Take 4 mLs by nebulization as needed for Cough (chest congestion).    SPIRONOLACTONE (ALDACTONE) 50 MG TABLET    Take 1 tablet (50 mg total) by mouth once daily.    TEZEPELUMAB-EKKO 210 MG/1.91 ML (110 MG/ML) PNIJ    Inject 210 mg into the skin every 28 days.    TIZANIDINE (ZANAFLEX) 4 MG TABLET    Take 1 tablet (4 mg total) by mouth every 12 (twelve) hours.    TRIAMCINOLONE ACETONIDE 0.025% (KENALOG) 0.025 % CREAM    Apply topically 2 (two) times daily. PRN rash and itching of ear. Mild steroid cream.     Review of patient's allergies indicates:   Allergen Reactions    Penicillins Anaphylaxis, Hives, Shortness Of Breath and Swelling    Penicillin     Adhesive Rash    Doxycycline Nausea Only    Oxycodone Other (See Comments)     Fatigue      Sulfa (sulfonamide antibiotics) Itching     Review of Systems   Constitutional: Positive for malaise/fatigue.   HENT:  Positive for hearing loss.    Eyes:  Negative for double vision and visual disturbance.   Cardiovascular:  Positive for irregular heartbeat and syncope. Negative for chest pain.   Respiratory:  Positive for shortness of breath.    Endocrine: Negative for cold intolerance.   Hematologic/Lymphatic: Does not bruise/bleed easily.   Skin:  Negative for poor wound healing and suspicious lesions.   Musculoskeletal:  Positive for arthritis, back pain, falls, joint pain, joint swelling and neck pain. Negative for gout.   Gastrointestinal:  Positive for constipation, diarrhea and heartburn. Negative for nausea and vomiting.   Genitourinary:  Positive for frequency. Negative for  dysuria.   Neurological:  Positive for loss of balance, numbness, paresthesias, seizures, sensory change and vertigo.   Psychiatric/Behavioral:  Positive for substance abuse. Negative for depression and memory loss. The patient has insomnia. The patient is not nervous/anxious.    Allergic/Immunologic: Negative for persistent infections.       Objective:   Body mass index is 18.54 kg/m².  There were no vitals filed for this visit.             General    Constitutional: She is oriented to person, place, and time. She appears well-developed and well-nourished. No distress.   HENT:   Head: Normocephalic.   Eyes: EOM are normal.   Pulmonary/Chest: Effort normal.   Neurological: She is oriented to person, place, and time.   Psychiatric: She has a normal mood and affect.             Right Hand/Wrist Exam     Inspection   Scars: Wrist - absent Hand -  absent  Effusion: Wrist - absent Hand -  absent    Pain   Wrist - The patient exhibits pain of the flexor/pronator group and CMC.    Tests   Phalens sign: positive  Tinel's sign (median nerve): positive  Carpal Tunnel Compression Test: positive    Atrophy   Thenar:  negative  Intrinsic:  negative    Other     Neuorologic Exam    Median Distribution: abnormal  Ulnar Distribution: normal  Radial Distribution: normal    Comments:  The patient has a positive Tinel and positive Phalen sign.  There is no thenar atrophy noted.  She has tenderness basal joint right thumb with a positive axial circumduction grind test      Left Hand/Wrist Exam     Inspection   Scars: Wrist - absent Hand -  absent  Effusion: Wrist - absent Hand -  absent    Pain   Hand - The patient exhibits pain of the thumb MCP.  Wrist - The patient exhibits pain of the flexor/pronator group and CMC.    Tests   Phalens sign: positive  Tinel's sign (median nerve): positive  Carpal Tunnel Compression Test: positive    Atrophy  Thenar:  Negative  Intrinsic: negative    Other     Sensory Exam  Median Distribution:  abnormal  Ulnar Distribution: normal  Radial Distribution: normal    Comments:  The patient has a positive Tinel and positive Phalen sign.  There is no thenar atrophy noted.  She has tenderness basal joint left thumb with a positive axial circumduction grind test.  There is active triggering left thumb with a palpable nodule that moves with tendon excursion          Vascular Exam       Capillary Refill  Right Hand: normal capillary refill  Left Hand: normal capillary refill         radiographs were not obtained today  Assessment:     Encounter Diagnoses   Name Primary?    Bilateral carpal tunnel syndrome Yes    Trigger finger, acquired     Arthritis of carpometacarpal (CMC) joint of both thumbs         Plan:       The patient is injected both carpal tunnels each with 1 cc Kenalog and 1 cc 2% plain lidocaine under sterile technique.  She was injected both thumb basal joints each with 0.5 cc of Kenalog and 0.5 cc of 2% plain lidocaine under sterile technique.  She was injected left trigger thumb with 0.5 cc of Kenalog and 0.5 cc of 2% plain lidocaine.  She will return in 3 months at her request for reinjection if needed              Disclaimer: This note was prepared using a voice recognition system and is likely to have sound alike errors within the text.          [1]   Social History  Socioeconomic History    Marital status: Single   Tobacco Use    Smoking status: Former     Current packs/day: 0.00     Average packs/day: 1 pack/day for 42.3 years (42.3 ttl pk-yrs)     Types: Cigarettes     Start date:      Quit date: 2004     Years since quittin.8    Smokeless tobacco: Former   Substance and Sexual Activity    Alcohol use: No    Drug use: Never    Sexual activity: Not Currently     Partners: Male   Social History Narrative    No pets or smokers in household.     Social Drivers of Health     Financial Resource Strain: Low Risk  (2025)    Overall Financial Resource Strain (CARDIA)     Difficulty of  Paying Living Expenses: Not very hard   Food Insecurity: No Food Insecurity (1/2/2025)    Hunger Vital Sign     Worried About Running Out of Food in the Last Year: Never true     Ran Out of Food in the Last Year: Never true   Transportation Needs: No Transportation Needs (1/2/2025)    TRANSPORTATION NEEDS     Transportation : No   Physical Activity: Unknown (7/30/2024)    Exercise Vital Sign     Days of Exercise per Week: 3 days   Recent Concern: Physical Activity - Inactive (6/17/2024)    Received from [facility] and Affiliates    Exercise Vital Sign     Days of Exercise per Week: 0 days     Minutes of Exercise per Session: 0 min   Stress: No Stress Concern Present (1/2/2025)    [employer] [place] [employer] - Occupational Stress Questionnaire     Feeling of Stress : Only a little   Housing Stability: Unknown (1/2/2025)    Housing Stability Vital Sign     Unable to Pay for Housing in the Last Year: No     Homeless in the Last Year: No

## 2025-02-22 LAB — BACTERIA SPEC AEROBE CULT: NO GROWTH

## 2025-02-24 ENCOUNTER — PATIENT MESSAGE (OUTPATIENT)
Dept: DERMATOLOGY | Facility: CLINIC | Age: 80
End: 2025-02-24
Payer: MEDICARE

## 2025-02-24 DIAGNOSIS — I25.118 CORONARY ARTERY DISEASE OF NATIVE ARTERY OF NATIVE HEART WITH STABLE ANGINA PECTORIS: ICD-10-CM

## 2025-02-24 RX ORDER — ATORVASTATIN CALCIUM 40 MG/1
40 TABLET, FILM COATED ORAL
Qty: 90 TABLET | Refills: 1 | Status: SHIPPED | OUTPATIENT
Start: 2025-02-24

## 2025-02-24 NOTE — TELEPHONE ENCOUNTER
Care Due:                  Date            Visit Type   Department     Provider  --------------------------------------------------------------------------------                                [facility] INTERNAL  Last Visit: 02-      FOLLOW UP    MEDICINE       Laron Chaudhari  Next Visit: None Scheduled  None         None Found                                                            Last  Test          Frequency    Reason                     Performed    Due Date  --------------------------------------------------------------------------------    HBA1C.......  6 months...  empagliflozin............  07- 01-    Vitamin D...  12 months..  cholecalciferol,.........  Not Found    Overdue    [employer] Embedded Care Due Messages. Reference number: [id number].   2/24/2025 10:14:23 AM CST

## 2025-02-24 NOTE — TELEPHONE ENCOUNTER
Provider Staff:  Action required for this patient    Requires labs      Please see care gap opportunities below in Care Due Message.    Thanks!  [facility]     Appointments      Date Provider   Last Visit   2/3/2025 Laron Chaudhari MD   Next Visit   Visit date not found Laron Chaudhari MD     Refill Decision Note   Shireen Felix  is requesting a refill authorization.    Brief Assessment and Rationale for Refill:   Approve       Medication Therapy Plan:         Comments:     Note composed:4:39 PM 02/24/2025

## 2025-02-25 DIAGNOSIS — L29.9 ITCHING: ICD-10-CM

## 2025-02-25 RX ORDER — HYDROXYZINE HYDROCHLORIDE 25 MG/1
25 TABLET, FILM COATED ORAL NIGHTLY
Qty: 30 TABLET | Refills: 5 | Status: SHIPPED | OUTPATIENT
Start: 2025-02-25 | End: 2026-02-25

## 2025-03-06 ENCOUNTER — OFFICE VISIT (OUTPATIENT)
Dept: ALLERGY | Facility: CLINIC | Age: 80
End: 2025-03-06
Payer: MEDICARE

## 2025-03-06 ENCOUNTER — HOSPITAL ENCOUNTER (OUTPATIENT)
Dept: RADIOLOGY | Facility: HOSPITAL | Age: 80
Discharge: HOME | End: 2025-03-06
Attending: STUDENT IN AN ORGANIZED HEALTH CARE EDUCATION/TRAINING PROGRAM
Payer: MEDICARE

## 2025-03-06 VITALS
SYSTOLIC BLOOD PRESSURE: 115 MMHG | WEIGHT: 108 LBS | HEIGHT: 64 IN | TEMPERATURE: 97 F | HEART RATE: 77 BPM | BODY MASS INDEX: 18.44 KG/M2 | DIASTOLIC BLOOD PRESSURE: 68 MMHG | OXYGEN SATURATION: 100 %

## 2025-03-06 DIAGNOSIS — J31.0 CHRONIC NONALLERGIC RHINITIS: ICD-10-CM

## 2025-03-06 DIAGNOSIS — J45.50 SEVERE PERSISTENT ASTHMA WITHOUT COMPLICATION: Primary | ICD-10-CM

## 2025-03-06 DIAGNOSIS — T50.905D ADVERSE EFFECT OF DRUG, SUBSEQUENT ENCOUNTER: ICD-10-CM

## 2025-03-06 DIAGNOSIS — J32.0 CHRONIC MAXILLARY SINUSITIS: ICD-10-CM

## 2025-03-06 PROCEDURE — 99999 PR PBB SHADOW E&M-EST. PATIENT-LVL V: CPT | Mod: PBBFAC,HCNC,, | Performed by: STUDENT IN AN ORGANIZED HEALTH CARE EDUCATION/TRAINING PROGRAM

## 2025-03-06 PROCEDURE — 3074F SYST BP LT 130 MM HG: CPT | Mod: HCNC,CPTII,S$GLB, | Performed by: STUDENT IN AN ORGANIZED HEALTH CARE EDUCATION/TRAINING PROGRAM

## 2025-03-06 PROCEDURE — 1160F RVW MEDS BY RX/DR IN RCRD: CPT | Mod: HCNC,CPTII,S$GLB, | Performed by: STUDENT IN AN ORGANIZED HEALTH CARE EDUCATION/TRAINING PROGRAM

## 2025-03-06 PROCEDURE — 1101F PT FALLS ASSESS-DOCD LE1/YR: CPT | Mod: HCNC,CPTII,S$GLB, | Performed by: STUDENT IN AN ORGANIZED HEALTH CARE EDUCATION/TRAINING PROGRAM

## 2025-03-06 PROCEDURE — 1126F AMNT PAIN NOTED NONE PRSNT: CPT | Mod: HCNC,CPTII,S$GLB, | Performed by: STUDENT IN AN ORGANIZED HEALTH CARE EDUCATION/TRAINING PROGRAM

## 2025-03-06 PROCEDURE — 3288F FALL RISK ASSESSMENT DOCD: CPT | Mod: HCNC,CPTII,S$GLB, | Performed by: STUDENT IN AN ORGANIZED HEALTH CARE EDUCATION/TRAINING PROGRAM

## 2025-03-06 PROCEDURE — 99214 OFFICE O/P EST MOD 30 MIN: CPT | Mod: HCNC,S$GLB,, | Performed by: STUDENT IN AN ORGANIZED HEALTH CARE EDUCATION/TRAINING PROGRAM

## 2025-03-06 PROCEDURE — 70486 CT MAXILLOFACIAL W/O DYE: CPT | Mod: 26,HCNC,, | Performed by: RADIOLOGY

## 2025-03-06 PROCEDURE — 3078F DIAST BP <80 MM HG: CPT | Mod: HCNC,CPTII,S$GLB, | Performed by: STUDENT IN AN ORGANIZED HEALTH CARE EDUCATION/TRAINING PROGRAM

## 2025-03-06 PROCEDURE — 70486 CT MAXILLOFACIAL W/O DYE: CPT | Mod: TC,HCNC

## 2025-03-06 PROCEDURE — 1159F MED LIST DOCD IN RCRD: CPT | Mod: HCNC,CPTII,S$GLB, | Performed by: STUDENT IN AN ORGANIZED HEALTH CARE EDUCATION/TRAINING PROGRAM

## 2025-03-07 ENCOUNTER — RESULTS FOLLOW-UP (OUTPATIENT)
Dept: OTOLARYNGOLOGY | Facility: CLINIC | Age: 80
End: 2025-03-07
Payer: MEDICARE

## 2025-03-07 NOTE — TELEPHONE ENCOUNTER
----- Message from Jani Light MD sent at 3/7/2025 12:59 PM CST -----  Please let her know that the scan shows that the sinuses are clear. If she interested in considering repair of her septal perforation, could you please schedule her with Dr. Gil at her convenience?   Thanks.   ----- Message -----  From: Interface, Rad Results In  Sent: 3/6/2025  11:00 AM CST  To: Jani Light MD

## 2025-03-07 NOTE — PROGRESS NOTES
Allergy and Immunology  Established Patient Clinic Note    Date: 3/6/2025  Chief Complaint   Patient presents with    Follow-up     Pt states that her condition has improved.     History  Shireen Felix is a 79 y.o. female being seen for follow-up today.    Chronic Non-Allergic Rhinitis  Nasal Pruritus/Nasal Septal Perforation   - PND not controled at this time       Severe Persistent Asthma/COPD  Pulmonary Fibrosis     - Overall improvement in symptoms and QOL since starting Tezspire   - [place] noting patient's overall improvement is due to multidisciplinary interventions  - Patient to continue inhalers and Tezspire at this time      Prior HPI:   - Onset: Asthma appears to have began in childhood   - Hx of smoking with COPD and also pulmonary fibrosis   - Multifactorial disease affecting quality of life   - Patient with multiple admissions and oral steroids for resp exacerbation   - Not controled despite inhalers and escalation to biologics indicated  - Eosinophils zero but on steroids      Drug Allergy  - PCN/Bactrim to be discussed at next appointment  - Planning for potential oral challenge after discussion of risk v benefits     Allergies, PMH, PSH, Social, and Family History were reviewed.    Medications Ordered Prior to Encounter[1]    Physical Examination  Vitals:    03/06/25 0923   BP: 115/68   Pulse: 77   Temp: 97 °F (36.1 °C)     GENERAL:  female in no apparent distress and well developed and well nourished  HEAD:  Normocephalic, without obvious abnormality, atraumatic  EYES: sclera anicteric, conjunctiva normochromic  EARS: normal TM's and external ear canals both ears  NOSE: without erythema or discharge, clear discharge, turbinates normal    OROPHARYNX: moist mucous membranes without erythema, exudates or petechiae  LYMPH NODES: normal, supple, no lymphadenopathy  LUNGS: clear to auscultation, no wheezes, rales or rhonchi, symmetric air entry.  HEART: normal rate, regular rhythm, normal S1, S2, no  murmurs, rubs, clicks or gallops.  ABDOMEN: soft, nontender, nondistended, no masses or organomegaly.  MUSCULOSKELETAL: no gross joint deformity or swelling.  NEURO: alert, oriented, normal speech, no focal findings or movement disorder noted.  SKIN: normal coloration and turgor, no rashes, no suspicious skin lesions noted.     Problem List Items Addressed This Visit       Chronic nonallergic rhinitis    Overview   - 04/29/2024: Serum IgE to Zone 6 Aeroallergens negative          Relevant Medications    olopatadine-mometasone 665-25 mcg/spray [place]    Drug reaction    Overview   - PCN/Bactrim to be discussed at next appointment  - Planning for potential oral challenge after discussion of risk v benefits          Relevant Medications    olopatadine-mometasone 665-25 mcg/spray [place]    Severe persistent asthma without complication - Primary    Relevant Medications    olopatadine-mometasone 665-25 mcg/spray [place]     - Asthma - well controlled on daily basis - increase QOL   - Continue Tezspire and inhalers   - ED precautions and inhaler education   - KASSANDRA/PND - not ideally controlled   - Ordered Ryaltris 2 SEN BID   - Educated on the proper use of intranasal sprays     Follow up:  Follow up in about 4 months (around 7/6/2025).    Rajani Irwin MD   [facility]  Allergy and Immunology        [1]   Current Outpatient Medications on File Prior to Visit   Medication Sig Dispense Refill    albuterol (PROVENTIL) 2.5 mg /3 mL (0.083 %) nebulizer solution Take 3 mLs (2.5 mg total) by nebulization every 4 (four) hours as needed for Wheezing. Rescue 180 mL 11    albuterol-budesonide (AIRSUPRA) 90-80 mcg/actuation Inhale 2 puffs into the lungs every 4 (four) hours as needed (wheezing, cough). 10.7 g 11    atorvastatin (LIPITOR) 40 MG tablet TAKE 1 TABLET BY MOUTH ONCE DAILY 90 tablet 1    benzonatate (TESSALON) 200 MG capsule Take 1 capsule (200 mg total) by mouth 3 (three) times daily as needed for Cough. . 30 capsule 0     bisoprolol (ZEBETA) 5 MG tablet Take 1 tablet (5 mg total) by mouth once daily. 30 tablet 11    blood sugar diagnostic Strp To check BG 1 times daily, to use with insurance preferred meter 100 strip 3    blood-glucose meter kit To check BG 1 times daily, to use with insurance preferred meter 1 each 0    cholecalciferol, vitamin D3, 1,250 mcg (50,000 unit) capsule Take 1 capsule (50,000 Units total) by mouth every 7 days. 12 capsule 0    clopidogreL (PLAVIX) 75 mg tablet Take 1 tablet (75 mg total) by mouth once daily. 90 tablet 3    denosumab (PROLIA) 60 mg/mL Syrg every 6 (six) months.      dextromethorphan-guaiFENesin  mg (MUCINEX DM)  mg per 12 hr tablet Take 1 tablet by mouth 2 (two) times daily. 10 tablet 0    dicyclomine (BENTYL) 10 MG capsule Take 1 capsule (10 mg total) by mouth 2 (two) times daily. 10 capsule 0    empagliflozin (JARDIANCE) 10 mg tablet Take 1 tablet (10 mg total) by mouth once daily. 90 tablet 3    EScitalopram oxalate (LEXAPRO) 20 MG tablet Take 1 tablet (20 mg total) by mouth once daily. 90 tablet 2    fluocinonide (LIDEX) 0.05 % external solution Apply topically 2 (two) times daily. Use on itchy spots on scalp and ear 60 mL 2    fluticasone-umeclidin-vilanter (TRELEGY ELLIPTA) 200-62.5-25 mcg inhaler Inhale 1 puff into the lungs once daily. 60 each 11    furosemide (LASIX) 20 MG tablet Take 1 tablet (20 mg total) by mouth daily as needed (for edema, swelling, fluid build up, or 3 pound weight gain in 24 hours). 30 tablet 11    gabapentin (NEURONTIN) 300 MG capsule Take 1 capsule (300 mg total) by mouth 2 (two) times daily. . 60 capsule 0    hydrOXYzine HCL (ATARAX) 25 MG tablet Take 1 tablet (25 mg total) by mouth every evening. 30 tablet 5    ipratropium (ATROVENT) 42 mcg (0.06 %) nasal spray 2 sprays by Each Nostril route 2 (two) times daily. 15 mL 11    isosorbide mononitrate (IMDUR) 120 MG 24 hr tablet Take 1 tablet (120 mg total) by mouth every evening. 90 tablet 3     ketoconazole (NIZORAL) 2 % cream Apply topically 2 (two) times daily. For dry flaky patches of ear. 30 g 1    lancets [facility] To check BG 1 times daily, to use with insurance preferred meter 100 each 3    levETIRAcetam (KEPPRA) 1000 MG tablet Take 1 tablet (1,000 mg total) by mouth 2 (two) times daily. 60 tablet 11    LIDOcaine (LIDODERM) 5 % Place 1 patch onto the skin once daily. Remove & Discard patch within 12 hours or as directed by MD 30 patch 0    magnesium oxide (MAG-OX) 400 mg (241.3 mg magnesium) tablet Take 1 tablet (400 mg total) by mouth once daily. 90 tablet 1    montelukast (SINGULAIR) 10 mg tablet Take 1 tablet (10 mg total) by mouth once daily. 90 tablet 3    mucus clearing device (AEROBIKA OSCILLATING PEP SYSTM) by Misc.(Non-Drug; Combo Route) route 4 (four) times daily. 1 each 0    nitroGLYCERIN (NITROSTAT) 0.4 MG SL tablet Place 1 tablet (0.4 mg total) under the tongue every 5 (five) minutes as needed for Chest pain. 100 tablet 0    omeprazole (PRILOSEC) 40 MG capsule Take 1 capsule (40 mg total) by mouth every morning. 90 capsule 3    pantoprazole (PROTONIX) 40 MG tablet TAKE 1 TABLET BY MOUTH TWICE DAILY 60 tablet 11    potassium chloride SA (K-DUR,KLOR-CON) 20 MEQ tablet Take 1 tablet (20 mEq total) by mouth once daily. Take extra dose with Lasix - fluid pill 90 tablet 1    pramoxine-hydrocortisone (ANALPRAM HC) cream Apply topically 3 (three) times daily. 57 g 11    promethazine-dextromethorphan (PROMETHAZINE-DM) 6.25-15 mg/5 mL Syrp Take 5 mLs by mouth every 8 (eight) hours as needed (Cough). . 300 mL 0    ranolazine (RANEXA) 1,000 mg Tb12 Take 1 tablet (1,000 mg total) by mouth 2 (two) times daily. 180 tablet 3    roflumilast (DALIRESP) 500 mcg Tab Take 1 tablet (500 mcg total) by mouth once daily.      sacubitriL-valsartan (ENTRESTO) 24-26 mg per tablet Take 1 tablet by mouth 2 (two) times daily. 180 tablet 3    sodium chloride (OCEAN) 0.65 % nasal spray 1 spray by Nasal route 3 (three) times  daily. . 60 mL 0    sodium chloride 3% 3 % nebulizer solution Take 4 mLs by nebulization as needed for Cough (chest congestion). 360 mL 11    spironolactone (ALDACTONE) 50 MG tablet Take 1 tablet (50 mg total) by mouth once daily. 90 tablet 3    tezepelumab-ekko 210 mg/1.91 mL (110 mg/mL) PnIj Inject 210 mg into the skin every 28 days. 1.91 mL 11    tiZANidine (ZANAFLEX) 4 MG tablet Take 1 tablet (4 mg total) by mouth every 12 (twelve) hours. 60 tablet 2    triamcinolone acetonide 0.025% (KENALOG) 0.025 % cream Apply topically 2 (two) times daily. PRN rash and itching of ear. Mild steroid cream. 30 g 0    [DISCONTINUED] cromolyn (NASALCROM) 5.2 mg/spray (4 %) nasal spray 1 spray by Nasal route 4 (four) times daily. 13 mL 11    [DISCONTINUED] fluticasone propionate (FLONASE) 50 mcg/actuation nasal spray 2 sprays (100 mcg total) by Each Nostril route once daily. 16 g 11    ALPRAZolam (XANAX) 0.25 MG tablet Take 1 tablet (0.25 mg total) by mouth 2 (two) times daily as needed for Anxiety. 60 tablet 0    cholestyramine (QUESTRAN) 4 gram packet TAKE 1 PACKET BY MOUTH TWICE DAILY AS DIRECTED (Patient not taking: Reported on 3/6/2025) 60 packet 11    latanoprost 0.005 % ophthalmic solution Place 1 drop into both eyes every evening. 2.5 mL 3     No current facility-administered medications on file prior to visit.

## 2025-03-07 NOTE — TELEPHONE ENCOUNTER
Call placed to patient to discuss CT results. I informed patient per Dr. Light the scan shows that the sinuses are clear. If you are interested in considering repair of your septal perforation, I could schedule you with Dr. Gil at your convenience. Patient verbalized understanding. Appointment scheduled.

## 2025-03-12 DIAGNOSIS — I50.43 ACUTE ON CHRONIC COMBINED SYSTOLIC AND DIASTOLIC CONGESTIVE HEART FAILURE: ICD-10-CM

## 2025-03-12 RX ORDER — SPIRONOLACTONE 50 MG/1
50 TABLET, FILM COATED ORAL
Qty: 90 TABLET | Refills: 3 | Status: SHIPPED | OUTPATIENT
Start: 2025-03-12

## 2025-03-12 NOTE — TELEPHONE ENCOUNTER
No care due was identified.  [facility] Embedded Care Due Messages. Reference number: [id number].   3/12/2025 8:39:38 AM CDT

## 2025-03-12 NOTE — TELEPHONE ENCOUNTER
Refill Routing Note   Medication(s) are not appropriate for processing by [facility] for the following reason(s):        Drug-disease interaction    [facility] action(s):  Defer      Medication Therapy Plan: Drug-Disease: spironolactone and Hyponatremia    Pharmacist review requested: Yes     Appointments  past 12m or future 3m with PCP    Date Provider   Last Visit   2/3/2025 Laron Chaudhari MD   Next Visit   Visit date not found Laron Chaudhari MD   ED visits in past 90 days: 0        Note composed:3:48 PM 03/12/2025

## 2025-03-12 NOTE — TELEPHONE ENCOUNTER
Refill Decision Note   Shireen Felix  is requesting a refill authorization.  Brief Assessment and Rationale for Refill:  Approve     Medication Therapy Plan:        Pharmacist review requested: Yes   Extended chart review required: Yes   Comments:     Note composed:4:24 PM 03/12/2025

## 2025-03-13 RX ORDER — ISOSORBIDE MONONITRATE 120 MG/1
120 TABLET, EXTENDED RELEASE ORAL NIGHTLY
Qty: 90 TABLET | Refills: 3 | Status: SHIPPED | OUTPATIENT
Start: 2025-03-13

## 2025-03-18 ENCOUNTER — TELEPHONE (OUTPATIENT)
Dept: CARDIOLOGY | Facility: CLINIC | Age: 80
End: 2025-03-18
Payer: MEDICARE

## 2025-03-18 ENCOUNTER — OFFICE VISIT (OUTPATIENT)
Dept: CARDIOLOGY | Facility: CLINIC | Age: 80
End: 2025-03-18
Payer: MEDICARE

## 2025-03-18 VITALS — SYSTOLIC BLOOD PRESSURE: 144 MMHG | DIASTOLIC BLOOD PRESSURE: 74 MMHG | BODY MASS INDEX: 19.22 KG/M2 | WEIGHT: 112 LBS

## 2025-03-18 DIAGNOSIS — J44.9 CHRONIC OBSTRUCTIVE PULMONARY DISEASE, UNSPECIFIED COPD TYPE: ICD-10-CM

## 2025-03-18 DIAGNOSIS — J45.50 SEVERE PERSISTENT ASTHMA WITHOUT COMPLICATION: ICD-10-CM

## 2025-03-18 DIAGNOSIS — I50.32 CHRONIC DIASTOLIC HEART FAILURE: ICD-10-CM

## 2025-03-18 DIAGNOSIS — E78.2 MIXED HYPERLIPIDEMIA: ICD-10-CM

## 2025-03-18 DIAGNOSIS — Z98.890 HISTORY OF CORONARY ANGIOGRAM: ICD-10-CM

## 2025-03-18 DIAGNOSIS — I10 PRIMARY HYPERTENSION: Primary | ICD-10-CM

## 2025-03-18 PROCEDURE — 3078F DIAST BP <80 MM HG: CPT | Mod: HCNC,CPTII,S$GLB, | Performed by: INTERNAL MEDICINE

## 2025-03-18 PROCEDURE — 3288F FALL RISK ASSESSMENT DOCD: CPT | Mod: HCNC,CPTII,S$GLB, | Performed by: INTERNAL MEDICINE

## 2025-03-18 PROCEDURE — 3077F SYST BP >= 140 MM HG: CPT | Mod: HCNC,CPTII,S$GLB, | Performed by: INTERNAL MEDICINE

## 2025-03-18 PROCEDURE — 99999 PR PBB SHADOW E&M-EST. PATIENT-LVL IV: CPT | Mod: PBBFAC,HCNC,, | Performed by: INTERNAL MEDICINE

## 2025-03-18 PROCEDURE — 1125F AMNT PAIN NOTED PAIN PRSNT: CPT | Mod: HCNC,CPTII,S$GLB, | Performed by: INTERNAL MEDICINE

## 2025-03-18 PROCEDURE — 99214 OFFICE O/P EST MOD 30 MIN: CPT | Mod: HCNC,S$GLB,, | Performed by: INTERNAL MEDICINE

## 2025-03-18 PROCEDURE — 1101F PT FALLS ASSESS-DOCD LE1/YR: CPT | Mod: HCNC,CPTII,S$GLB, | Performed by: INTERNAL MEDICINE

## 2025-03-18 PROCEDURE — 1159F MED LIST DOCD IN RCRD: CPT | Mod: HCNC,CPTII,S$GLB, | Performed by: INTERNAL MEDICINE

## 2025-03-18 NOTE — PROGRESS NOTES
Subjective:   Patient ID:  Shireen Felix is a 79 y.o. female who presents for evaluation of Follow-up and Shortness of Breath      Shortness of Breath  Associated symptoms include chest pain. Pertinent negatives include no syncope.   Palpitations   Associated symptoms include chest pain and shortness of breath. Pertinent negatives include no syncope.   Follow-up  Associated symptoms include chest pain.   3.18.2025  Discharged from hospital in February for respiratory distress.    She feels better now.    She has moderate-to-severe pulmonary fibrosis on her CT scan of the lung.    Echocardiogram with normalized EF.    Heart catheterization last year with mild nonobstructive disease.    No lower extremity swelling.    There was consideration of palliative care during hospitalization from review of discharge summary.    9.4.2024  Overall looks better  States did some recent PT at home   Cardiac monitor non relevant for 18 days  Episodes of tachy, I suspect she gets panic attacks or anxiety attacks   She is on GUIDELINE DIRECTED MAXIMAL TOLERATED medical treatment for CHF  Euvolemic on exam     May 23, 2024.    Comes in for a follow-up after recent coronary angiogram.  She had nonobstructive disease.    Her filling pressures on the left were mildly elevated no evidence of severe pulmonary hypertension.    She followed  the Pulmonary for her mild pulmonary fibrosis.  No change in treatment.    She was satting above 97% on room air in the clinic today with a heart rate of 70.  EKG unchanged.  While asked to deep breathe on exam she all of the sudden started to complain of chest pain and near-syncope and tachycardia.    Her vital signs were unchanged.  With the heart rate of 70s and a pulse ox of 97%.        5.9.2024  78-year-old female, with history of CAD possible ischemic cardiomyopathy.  She had a stent she states more than 10 years ago possibly to the LAD.  Her LV EF has been in up and down with the past few  years between 35 40% and 50%.    She also has pulmonary fibrosis moderate at least by CT scan.  She follows with Pulmonary.  However she states that her oxygen is 100% it does not drop with exertion.  She has to stop as soon as he walks due to chest heaviness radiating up to her neck.  This is happening with a very low level of activity CCS 3-4.  She has a an abundant amount of coronary artery calcification on her CT scan.    She is maxed out on antianginals almost.    Has dyspnea on exertion.  Was recently had [place] for CHF.  Her BNP was elevated.    However today she looks good at rest.  Her lungs has minimal crackles possibly secondary to her long history of pulmonary fibrosis.    Past Medical History:   Diagnosis Date    Abnormal ECG 08/05/2019    Acute bronchitis and bronchiolitis 12/31/2024    Aneurysm     Anticoagulant long-term use     Arthritis     CAD in native artery 08/05/2019    Diabetes mellitus     Fall 10/10/2019    Formatting of this note might be different from the original. Found sitting on floor next to bed last night Mild confusion today Does not recall falling or how she ended up on floor UA today Labs yesterday unremarkable    Glaucoma     Hypertension     Pulmonary fibrosis     Seizures     Shingles 05/27/2017    Stroke        Past Surgical History:   Procedure Laterality Date    BRAIN SURGERY      CARDIAC CATHETERIZATION      CATHETERIZATION OF BOTH LEFT AND RIGHT HEART N/A 5/17/2024    Procedure: CATHETERIZATION, HEART, BOTH LEFT AND RIGHT;  Surgeon: Rachelle Sarabia MD;  Location: [facility] CATH LAB;  Service: Cardiology;  Laterality: N/A;    COLONOSCOPY N/A 09/21/2023    Procedure: COLONOSCOPY;  Surgeon: Joanna Leal MD;  Location: [facility] ENDO;  Service: Endoscopy;  Laterality: N/A;    CORONARY ANGIOPLASTY      EPIDURAL STEROID INJECTION INTO CERVICAL SPINE N/A 2/7/2024    Procedure: Cervical IL XANDER, Level C6/7, RN IV sedation;  Surgeon: Francisco Oshea MD;  Location: [facility]  PAIN MGT;  Service: Pain Management;  Laterality: N/A;    EPIDURAL STEROID INJECTION INTO LUMBAR SPINE Bilateral 11/12/2019    Procedure: TF XANDER L4/5;  Surgeon: Desean Dias MD;  Location: [facility] PAIN MGT;  Service: Pain Management;  Laterality: Bilateral;    HYSTERECTOMY      INJECTION OF ANESTHETIC AGENT AROUND MEDIAL BRANCH NERVES INNERVATING LUMBAR FACET JOINT Bilateral 01/31/2020    Procedure: Bilateral L3-5 MBB;  Surgeon: Desean Dias MD;  Location: [facility] PAIN MGT;  Service: Pain Management;  Laterality: Bilateral;    INJECTION OF ANESTHETIC AGENT INTO SACROILIAC JOINT Right 11/16/2021    Procedure: Right BLOCK, SACROILIAC JOINT Right GTB with RN IV sedation;  Surgeon: Yao Fulton MD;  Location: [facility] PAIN MGT;  Service: Pain Management;  Laterality: Right;    INJECTION OF ANESTHETIC AGENT INTO SACROILIAC JOINT Bilateral 07/28/2023    Procedure: Bilateral GT bursa + bilateral SIJ injection;  Surgeon: Francisco Oshea MD;  Location: [facility] PAIN MGT;  Service: Pain Management;  Laterality: Bilateral;    INJECTION OF JOINT Bilateral 07/09/2020    Procedure: Bilateral shoulder GH Joint injection with local;  Surgeon: Desean Dias MD;  Location: [facility] PAIN MGT;  Service: Pain Management;  Laterality: Bilateral;    INJECTION OF JOINT Bilateral 07/28/2023    Procedure: Bilateral GT bursa + bilateral SIJ injection NEEDS ADDITIONAL SEDATION AND MORE TIME BEFORE INJECTION RN IV Sedation;  Surgeon: Francisco Oshea MD;  Location: [facility] PAIN MGT;  Service: Pain Management;  Laterality: Bilateral;    INJECTION OF JOINT N/A 10/25/2023    Procedure: Sacrococcygeal joint injection;  Surgeon: Francisco Oshea MD;  Location: [facility] PAIN MGT;  Service: Pain Management;  Laterality: N/A;    OOPHORECTOMY      SELECTIVE INJECTION OF ANESTHETIC AGENT AROUND LUMBAR SPINAL NERVE ROOT BY TRANSFORAMINAL APPROACH Bilateral 04/26/2023    Procedure: Bilateral L4/5 TF XANDER RN IV Sedation;  Surgeon: Francisco Oshea MD;  Location: [facility] PAIN MGT;   Service: Pain Management;  Laterality: Bilateral;    TRANSFORAMINAL EPIDURAL INJECTION OF STEROID Bilateral 2019    Procedure: Bilateral L3/4 Transforaminal Epidural Steroid Injection;  Surgeon: Desean Dias MD;  Location: [facility] PAIN MGT;  Service: Pain Management;  Laterality: Bilateral;    TRANSFORAMINAL EPIDURAL INJECTION OF STEROID Bilateral 03/10/2020    Procedure: Right T12/L1 TF XANDER with local;  Surgeon: Desaen Dias MD;  Location: [facility] PAIN MGT;  Service: Pain Management;  Laterality: Bilateral;    TRANSFORAMINAL EPIDURAL INJECTION OF STEROID Right 2020    Procedure: Right T12/L1 TFESI Covid day of procedure;  Surgeon: Desean Dias MD;  Location: [facility] PAIN MGT;  Service: Pain Management;  Laterality: Right;       Social History     Tobacco Use    Smoking status: Former     Current packs/day: 0.00     Average packs/day: 1 pack/day for 42.3 years (42.3 ttl pk-yrs)     Types: Cigarettes     Start date:      Quit date: 2004     Years since quittin.9    Smokeless tobacco: Former   Substance Use Topics    Alcohol use: No    Drug use: Never       Family History   Problem Relation Name Age of Onset    No Known Problems Mother      No Known Problems Father      Breast cancer Maternal Aunt      Breast cancer Maternal Aunt      Breast cancer Maternal Aunt         Review of Systems   Cardiovascular:  Positive for chest pain, dyspnea on exertion and palpitations. Negative for syncope.   Respiratory:  Positive for shortness of breath.    Genitourinary: Negative.    Neurological: Negative.        Current Outpatient Medications on File Prior to Visit   Medication Sig    albuterol (PROVENTIL) 2.5 mg /3 mL (0.083 %) nebulizer solution Take 3 mLs (2.5 mg total) by nebulization every 4 (four) hours as needed for Wheezing. Rescue    albuterol-budesonide (AIRSUPRA) 90-80 mcg/actuation Inhale 2 puffs into the lungs every 4 (four) hours as needed (wheezing, cough).    atorvastatin (LIPITOR) 40  MG tablet TAKE 1 TABLET BY MOUTH ONCE DAILY    benzonatate (TESSALON) 200 MG capsule Take 1 capsule (200 mg total) by mouth 3 (three) times daily as needed for Cough. .    bisoprolol (ZEBETA) 5 MG tablet Take 1 tablet (5 mg total) by mouth once daily.    blood sugar diagnostic Strp To check BG 1 times daily, to use with insurance preferred meter    blood-glucose meter kit To check BG 1 times daily, to use with insurance preferred meter    cholecalciferol, vitamin D3, 1,250 mcg (50,000 unit) capsule Take 1 capsule (50,000 Units total) by mouth every 7 days.    cholestyramine (QUESTRAN) 4 gram packet TAKE 1 PACKET BY MOUTH TWICE DAILY AS DIRECTED    clopidogreL (PLAVIX) 75 mg tablet Take 1 tablet (75 mg total) by mouth once daily.    denosumab (PROLIA) 60 mg/mL Syrg every 6 (six) months.    dextromethorphan-guaiFENesin  mg (MUCINEX DM)  mg per 12 hr tablet Take 1 tablet by mouth 2 (two) times daily.    dicyclomine (BENTYL) 10 MG capsule Take 1 capsule (10 mg total) by mouth 2 (two) times daily.    empagliflozin (JARDIANCE) 10 mg tablet Take 1 tablet (10 mg total) by mouth once daily.    EScitalopram oxalate (LEXAPRO) 20 MG tablet Take 1 tablet (20 mg total) by mouth once daily.    fluocinonide (LIDEX) 0.05 % external solution Apply topically 2 (two) times daily. Use on itchy spots on scalp and ear    fluticasone-umeclidin-vilanter (TRELEGY ELLIPTA) 200-62.5-25 mcg inhaler Inhale 1 puff into the lungs once daily.    furosemide (LASIX) 20 MG tablet Take 1 tablet (20 mg total) by mouth daily as needed (for edema, swelling, fluid build up, or 3 pound weight gain in 24 hours).    gabapentin (NEURONTIN) 300 MG capsule Take 1 capsule (300 mg total) by mouth 2 (two) times daily. .    hydrOXYzine HCL (ATARAX) 25 MG tablet Take 1 tablet (25 mg total) by mouth every evening.    ipratropium (ATROVENT) 42 mcg (0.06 %) nasal spray 2 sprays by Each Nostril route 2 (two) times daily.    isosorbide mononitrate (IMDUR) 120  MG 24 hr tablet TAKE 1 TABLET BY MOUTH EVERY EVENING    ketoconazole (NIZORAL) 2 % cream Apply topically 2 (two) times daily. For dry flaky patches of ear.    lancets Misc To check BG 1 times daily, to use with insurance preferred meter    levETIRAcetam (KEPPRA) 1000 MG tablet Take 1 tablet (1,000 mg total) by mouth 2 (two) times daily.    LIDOcaine (LIDODERM) 5 % Place 1 patch onto the skin once daily. Remove & Discard patch within 12 hours or as directed by MD    magnesium oxide (MAG-OX) 400 mg (241.3 mg magnesium) tablet Take 1 tablet (400 mg total) by mouth once daily.    montelukast (SINGULAIR) 10 mg tablet Take 1 tablet (10 mg total) by mouth once daily.    mucus clearing device (AEROBIKA OSCILLATING PEP SYSTM) by Misc.(Non-Drug; Combo Route) route 4 (four) times daily.    nitroGLYCERIN (NITROSTAT) 0.4 MG SL tablet Place 1 tablet (0.4 mg total) under the tongue every 5 (five) minutes as needed for Chest pain.    olopatadine-mometasone 665-25 mcg/spray Spry 2 sprays by Nasal route 2 (two) times daily.    omeprazole (PRILOSEC) 40 MG capsule Take 1 capsule (40 mg total) by mouth every morning.    pantoprazole (PROTONIX) 40 MG tablet TAKE 1 TABLET BY MOUTH TWICE DAILY    potassium chloride SA (K-DUR,KLOR-CON) 20 MEQ tablet Take 1 tablet (20 mEq total) by mouth once daily. Take extra dose with Lasix - fluid pill    pramoxine-hydrocortisone (ANALPRAM HC) cream Apply topically 3 (three) times daily.    promethazine-dextromethorphan (PROMETHAZINE-DM) 6.25-15 mg/5 mL Syrp Take 5 mLs by mouth every 8 (eight) hours as needed (Cough). .    ranolazine (RANEXA) 1,000 mg Tb12 Take 1 tablet (1,000 mg total) by mouth 2 (two) times daily.    roflumilast (DALIRESP) 500 mcg Tab Take 1 tablet (500 mcg total) by mouth once daily.    sacubitriL-valsartan (ENTRESTO) 24-26 mg per tablet Take 1 tablet by mouth 2 (two) times daily.    sodium chloride (OCEAN) 0.65 % nasal spray 1 spray by Nasal route 3 (three) times daily. .    sodium  chloride 3% 3 % nebulizer solution Take 4 mLs by nebulization as needed for Cough (chest congestion).    spironolactone (ALDACTONE) 50 MG tablet TAKE 1 TABLET BY MOUTH ONCE DAILY    tezepelumab-ekko 210 mg/1.91 mL (110 mg/mL) PnIj Inject 210 mg into the skin every 28 days.    tiZANidine (ZANAFLEX) 4 MG tablet Take 1 tablet (4 mg total) by mouth every 12 (twelve) hours.    triamcinolone acetonide 0.025% (KENALOG) 0.025 % cream Apply topically 2 (two) times daily. PRN rash and itching of ear. Mild steroid cream.    ALPRAZolam (XANAX) 0.25 MG tablet Take 1 tablet (0.25 mg total) by mouth 2 (two) times daily as needed for Anxiety.    latanoprost 0.005 % ophthalmic solution Place 1 drop into both eyes every evening.     No current facility-administered medications on file prior to visit.       Objective:   Objective:  Wt Readings from Last 3 Encounters:   03/18/25 50.8 kg (111 lb 15.9 oz)   03/06/25 49 kg (108 lb 0.4 oz)   02/21/25 49 kg (108 lb 0.4 oz)     Temp Readings from Last 3 Encounters:   03/06/25 97 °F (36.1 °C) (Temporal)   01/28/25 98.7 °F (37.1 °C) (Oral)   12/05/24 97.7 °F (36.5 °C) (Temporal)     BP Readings from Last 3 Encounters:   03/18/25 (!) 144/74   03/06/25 115/68   01/28/25 (!) 101/56     Pulse Readings from Last 3 Encounters:   03/06/25 77   01/28/25 88   12/31/24 84       Physical Exam  Vitals reviewed.   Constitutional:       Appearance: She is well-developed.   Neck:      Vascular: No carotid bruit.   Cardiovascular:      Rate and Rhythm: Normal rate and regular rhythm.      Pulses: Intact distal pulses.      Heart sounds: Normal heart sounds. No murmur heard.  Pulmonary:      Breath sounds: Rales present.   Neurological:      Mental Status: She is oriented to person, place, and time.         Lab Results   Component Value Date    CHOL 112 (L) 07/12/2024    CHOL 158 05/02/2024    CHOL 142 04/25/2024     Lab Results   Component Value Date    HDL 41 07/12/2024    HDL 48 05/02/2024    HDL 56  04/25/2024     Lab Results   Component Value Date    LDLCALC 49.8 (L) 07/12/2024    LDLCALC 87.6 05/02/2024    LDLCALC 72 04/25/2024     Lab Results   Component Value Date    TRIG 106 07/12/2024    TRIG 112 05/02/2024    TRIG 72 04/25/2024     Lab Results   Component Value Date    CHOLHDL 36.6 07/12/2024    CHOLHDL 30.4 05/02/2024    CHOLHDL 47.9 08/31/2023       Chemistry        Component Value Date/Time     (L) 01/28/2025 0516    K 3.8 01/28/2025 0516     01/28/2025 0516    CO2 20 (L) 01/28/2025 0516    BUN 58 (H) 01/28/2025 0516    CREATININE 1.3 01/28/2025 0516    GLU 82 01/28/2025 0516        Component Value Date/Time    CALCIUM 9.3 01/28/2025 0516    ALKPHOS 44 01/18/2025 0424    AST 23 01/18/2025 0424    ALT 23 01/18/2025 0424    BILITOT 0.2 01/18/2025 0424    ESTGFRAFRICA 74 06/22/2024 0458    ESTGFRAFRICA >60 06/02/2022 1549    EGFRNONAA >60 06/02/2022 1549          Lab Results   Component Value Date    TSH 1.004 09/15/2023     Lab Results   Component Value Date    INR 1.0 01/18/2025    INR 1.1 12/31/2024    INR 1.2 05/08/2024     Lab Results   Component Value Date    WBC 6.58 02/18/2025    HGB 8.7 (L) 02/18/2025    HCT 29.6 (L) 02/18/2025    MCV 98 02/18/2025     02/18/2025     BNP  @LABRCNTIP(BNP,BNPTRIAGEBLO)@  CrCl cannot be calculated (Patient's most recent lab result is older than the maximum 7 days allowed.).     Imaging:  ======    No results found for this or any previous visit.    Results for orders placed during the hospital encounter of 06/21/23    US Carotid Bilateral    Narrative  EXAMINATION:  US CAROTID BILATERAL    TECHNIQUE:  Grayscale and color Doppler ultrasound examination of the carotid and vertebral artery systems bilaterally.  Stenosis estimates are per the NASCET measurement criteria.    COMPARISON:  None.    FINDINGS:  Right:    Internal Carotid Artery (ICA) peak systolic velocity 69 cm/sec    ICA/CCA peak systolic velocity ratio: 1.0    Plaque formation:  Minimal    Vertebral artery: Antegrade flow and normal waveform.    Left:    Internal Carotid Artery (ICA)  peak systolic velocity 62 cm/sec    ICA/CCA peak systolic velocity ratio: 0.8    Plaque formation: Minimal    Vertebral artery: Antegrade flow and normal waveform.    Impression  No evidence of a hemodynamically significant carotid bifurcation stenosis.      Electronically signed by: Kristopher Velasquez MD  Date:    06/21/2023  Time:    15:13    Results for orders placed during the hospital encounter of 04/15/24    X-Ray Chest PA And Lateral    Narrative  EXAMINATION:  XR CHEST PA AND LATERAL    CLINICAL HISTORY:  Cough, unspecified    TECHNIQUE:  PA and lateral views of the chest were performed.    COMPARISON:  04/05/2024    FINDINGS:  The cardiac and mediastinal silhouettes appear within normal limits.   Diffuse coarsening of the interstitial markings remains unchanged from multiple prior examinations.  Underlying interstitial lung disease not excluded.  No focal parenchymal consolidation or definite pleural effusion demonstrated.  No acute osseous findings demonstrated.    Impression  Unchanged appearance of the chest as above      Electronically signed by: Neri Husain MD  Date:    04/15/2024  Time:    10:33    No results found for this or any previous visit.    No valid procedures specified.    No results found for this or any previous visit.      Results for orders placed during the hospital encounter of 06/02/23    Nuclear Stress - Cardiology Interpreted    Interpretation Summary    Normal myocardial perfusion scan. There is no evidence of myocardial ischemia or infarction.    The gated perfusion images showed an ejection fraction of 54% at rest. The gated perfusion images showed an ejection fraction of 62% post stress.    There is normal wall motion at rest and post stress.    LV cavity size is normal at rest and normal at stress.    The ECG portion of the study is negative for ischemia.    The patient  reported no chest pain during the stress test.      Results for orders placed during the hospital encounter of 06/02/23    Echo    Interpretation Summary  · The left ventricle is normal in size with concentric hypertrophy and low normal systolic function.  · The estimated ejection fraction is 50%.  · Normal left ventricular diastolic function.  · There is abnormal septal wall motion consistent with left bundle branch block.  · Normal right ventricular size with normal right ventricular systolic function.  · Normal central venous pressure (3 mmHg).  · The estimated PA systolic pressure is 30 mmHg.  · Mild mitral regurgitation.       CONCLUSIONS: 4.2024  1. Normal left ventricular cavity size. Moderately depressed left ventricular systolic   function. LVEF 35 - 40%.   2. Normal right ventricular size and systolic function.   3. The left atrium is normal in size.   4. Normal right atrial size and morphology.   5. Mild mitral valve regurgitation. Mitral valve leaflets appear mildly thickened.   6. Aortic valve cusps appear mildly calcified. AV peak PG 13 mmHg. AV Mean PG 7 mmHg. OLIVIA   (VTI) 1.38 cm2.   7. No or trivial tricuspid valve regurgitation.   8. Mild pulmonic valve regurgitation.   9. Normal pericardium without evidence of pericardial effusion.   10. There is mild atherosclerosis visualized in the abdominal aorta by limited views.     Results for orders placed during the hospital encounter of 05/17/24    Cardiac catheterization    Conclusion    The pre-procedure left ventricular end diastolic pressure was 18.    The estimated blood loss was none.    There was non-obstructive coronary artery disease..    The filling pressures on the left were mildly elevated.    Normal pulmonary artery pressures    The procedure log was documented by Documenter: Chrissy De La Torre RN and verified by Rachelle Sarabia MD.    Date: 5/17/2024  Time: 10:28 AM      Diagnostic Results:  ECG: Reviewed      Interpretation Summary  Show  Result Comparison     The predominant rhythm is sinus.     The patient was monitored for a total of 17d 22h, underlying rhythm is Sinus.  QRS morphology changes were noted.  The minimum heart rate was 62 bpm; the maximum 106 bpm; the average 73 bpm.  0 % of Atrial fibrillation/Atrial flutter with longest episode of 0 ms.  The total burden of AV Block present was 0 % [Complete Heart Block: 0 %; Advanced (High Grade):  0 %; 2nd Degree, Mobitz II: 0 %; 2nd Degree, Mobitz I: 0 %].  There were 0 pauses, the longest pause was 0 ms at --.  Total count of Ventricular Tachycardia (VT): 0 episode(s). Longest VT: 0 s on --. Fastest VT: -- bpm on  --.  6 supraventricular episodes were found. Longest SVT Episode 6 beats, Fastest  bpm  There were a total of 12 PVCs with 1 morphologies and 2 couplets. Overall PVC [place] at < 0.01 %  There were a total of 0 Other Beats. There were 0 total number of paced beats.  There were a total of 235 PSVCs with 1 morphologies and 0 couplets. Overall PSVC [place] at  0.03 %  There is a total of 0 patient events     Results for orders placed during the hospital encounter of 12/31/24    Echo Saline Bubble? No; Ultrasound enhancing contrast? No    Interpretation Summary    Left Ventricle: The left ventricle is normal in size. There is mild concentric hypertrophy. There is normal systolic function with a visually estimated ejection fraction of 60 - 65%. Grade I diastolic dysfunction.    Right Ventricle: Normal right ventricular cavity size. Systolic function is normal.    Aortic Valve: There is mild aortic valve sclerosis.    Pulmonary Artery: There is borderline elevated pulmonary hypertension. The estimated pulmonary artery systolic pressure is 32 mmHg.    IVC/SVC: Normal venous pressure at 3 mmHg.      The ASCVD Risk score (Kusum DK, et al., 2019) failed to calculate for the following reasons:    The 2019 ASCVD risk score is only valid for ages 40 to 79    Risk score cannot be calculated  because patient has a medical history suggesting prior/existing ASCVD        Assessment and Plan:   Primary hypertension    History of coronary angiogram    Chronic obstructive pulmonary disease, unspecified COPD type    Severe persistent asthma without complication    Mixed hyperlipidemia    Chronic diastolic heart failure        Euvolemic.  Blood pressure controlled.  Continue with Plavix.  Continue with Imdur 120.  Continue with the Ranexa.    On gdmt including bb (BISOPROLOL) , entresto , spironolactone, imdur , lasix, Jardiance  Follow with Pulmonary for pulmonary fibrosis.    Recovered EF almost recent echo  No evidence of critical CAD to explain also her symptoms.  On catheterization in May 2024  Non relevant 14 days monitor   follow with primary care for possible anxiety versus panic attacks.  Reviewed coronary angiogram in the past visit, non obs  Reviewed all tests and above medical conditions with patient in detail and formulated treatment plan.  Risk factor modification discussed.   Cardiac low salt diet discussed.  Maintaining healthy weight and weight loss goals were discussed in clinic.    Follow up in 6 months

## 2025-03-18 NOTE — TELEPHONE ENCOUNTER
Attempted to contact pt regarding earlier appointment time lvm for pt to call back.  ----- Message from Patricia sent at 3/18/2025  7:38 AM CDT -----  .Type:   Earlier Time Slot Appointment RequestName of Caller:.Shireen Felix When is the first available appointment?todayBest Call Back Number:. [phone] Information: Patient would like to know if there is an earlier time slot today. Call the patient back to advise. Edin. EL

## 2025-03-19 ENCOUNTER — EXTERNAL HOME HEALTH (OUTPATIENT)
Dept: HOME HEALTH SERVICES | Facility: HOSPITAL | Age: 80
End: 2025-03-19
Payer: MEDICARE

## 2025-03-25 DIAGNOSIS — L29.9 ITCHING: ICD-10-CM

## 2025-03-25 RX ORDER — HYDROXYZINE HYDROCHLORIDE 25 MG/1
25 TABLET, FILM COATED ORAL NIGHTLY
Qty: 30 TABLET | Refills: 2 | Status: SHIPPED | OUTPATIENT
Start: 2025-03-25

## 2025-03-25 RX ORDER — GABAPENTIN 300 MG/1
300 CAPSULE ORAL 2 TIMES DAILY
Qty: 60 CAPSULE | Refills: 11 | Status: SHIPPED | OUTPATIENT
Start: 2025-03-25

## 2025-04-03 ENCOUNTER — TELEPHONE (OUTPATIENT)
Dept: INTERNAL MEDICINE | Facility: CLINIC | Age: 80
End: 2025-04-03
Payer: MEDICARE

## 2025-04-07 ENCOUNTER — TELEPHONE (OUTPATIENT)
Dept: INTERNAL MEDICINE | Facility: CLINIC | Age: 80
End: 2025-04-07
Payer: MEDICARE

## 2025-04-07 NOTE — TELEPHONE ENCOUNTER
Spoke with pt. Pt states she needs a new order for a new motorized wheelchair since the last one was written several years ago.   Order needs to be faxed to [facility]

## 2025-04-07 NOTE — TELEPHONE ENCOUNTER
----- Message from Octavia sent at 4/7/2025  2:49 PM CDT -----  Regarding: Questions and concerns  Contact: [phone]  Type:  Needs Medical AdviceWho Called: Marycruzould the patient rather a call back or a response via [facility]? CallBest Call Back Number:  [phone] Information: Calling in ref to her motorized wheelchair

## 2025-04-08 NOTE — TELEPHONE ENCOUNTER
Motorized WC rx written. This will probably first in many steps needed. Will likely need PT evaluation.

## 2025-04-09 DIAGNOSIS — I10 PRIMARY HYPERTENSION: ICD-10-CM

## 2025-04-09 DIAGNOSIS — G89.4 CHRONIC PAIN DISORDER: ICD-10-CM

## 2025-04-09 DIAGNOSIS — M79.18 MYOFASCIAL MUSCLE PAIN: ICD-10-CM

## 2025-04-09 RX ORDER — BISOPROLOL FUMARATE 5 MG/1
5 TABLET, FILM COATED ORAL
Qty: 30 TABLET | Refills: 11 | Status: SHIPPED | OUTPATIENT
Start: 2025-04-09

## 2025-04-09 RX ORDER — SACUBITRIL AND VALSARTAN 24; 26 MG/1; MG/1
1 TABLET, FILM COATED ORAL 2 TIMES DAILY
Qty: 180 TABLET | Refills: 3 | Status: SHIPPED | OUTPATIENT
Start: 2025-04-09

## 2025-04-09 RX ORDER — TIZANIDINE 4 MG/1
TABLET ORAL
Qty: 60 TABLET | Refills: 2 | OUTPATIENT
Start: 2025-04-09

## 2025-04-09 NOTE — TELEPHONE ENCOUNTER
Called patient and patient informed me that she needed a refill on her medication. I informed patient that she would need an appt being that she havent been seen in 6 months. Patient also wanted to discuss getting an injection.scheduled an appt.    Sheree CAMPBELL

## 2025-04-09 NOTE — TELEPHONE ENCOUNTER
No care due was identified.  [facility] Embedded Care Due Messages. Reference number: [id number].   4/09/2025 8:09:29 AM CDT

## 2025-04-12 ENCOUNTER — DOCUMENT SCAN (OUTPATIENT)
Dept: HOME HEALTH SERVICES | Facility: HOSPITAL | Age: 80
End: 2025-04-12
Payer: MEDICARE

## 2025-04-15 ENCOUNTER — OFFICE VISIT (OUTPATIENT)
Dept: PAIN MEDICINE | Facility: CLINIC | Age: 80
End: 2025-04-15
Payer: MEDICARE

## 2025-04-15 VITALS
HEART RATE: 64 BPM | HEIGHT: 64 IN | SYSTOLIC BLOOD PRESSURE: 138 MMHG | WEIGHT: 115.94 LBS | BODY MASS INDEX: 19.79 KG/M2 | DIASTOLIC BLOOD PRESSURE: 62 MMHG

## 2025-04-15 DIAGNOSIS — M53.3 SACROILIAC JOINT PAIN: ICD-10-CM

## 2025-04-15 DIAGNOSIS — M47.816 LUMBAR SPONDYLOSIS: ICD-10-CM

## 2025-04-15 DIAGNOSIS — M79.18 MYOFASCIAL MUSCLE PAIN: Primary | ICD-10-CM

## 2025-04-15 DIAGNOSIS — G89.4 CHRONIC PAIN DISORDER: ICD-10-CM

## 2025-04-15 PROCEDURE — 1101F PT FALLS ASSESS-DOCD LE1/YR: CPT | Mod: HCNC,CPTII,S$GLB, | Performed by: PHYSICIAN ASSISTANT

## 2025-04-15 PROCEDURE — 20553 NJX 1/MLT TRIGGER POINTS 3/>: CPT | Mod: HCNC,S$GLB,, | Performed by: PHYSICIAN ASSISTANT

## 2025-04-15 PROCEDURE — 99213 OFFICE O/P EST LOW 20 MIN: CPT | Mod: HCNC,25,S$GLB, | Performed by: PHYSICIAN ASSISTANT

## 2025-04-15 PROCEDURE — 1125F AMNT PAIN NOTED PAIN PRSNT: CPT | Mod: HCNC,CPTII,S$GLB, | Performed by: PHYSICIAN ASSISTANT

## 2025-04-15 PROCEDURE — 3078F DIAST BP <80 MM HG: CPT | Mod: HCNC,CPTII,S$GLB, | Performed by: PHYSICIAN ASSISTANT

## 2025-04-15 PROCEDURE — 3075F SYST BP GE 130 - 139MM HG: CPT | Mod: HCNC,CPTII,S$GLB, | Performed by: PHYSICIAN ASSISTANT

## 2025-04-15 PROCEDURE — 3288F FALL RISK ASSESSMENT DOCD: CPT | Mod: HCNC,CPTII,S$GLB, | Performed by: PHYSICIAN ASSISTANT

## 2025-04-15 PROCEDURE — 1159F MED LIST DOCD IN RCRD: CPT | Mod: HCNC,CPTII,S$GLB, | Performed by: PHYSICIAN ASSISTANT

## 2025-04-15 PROCEDURE — 99999 PR PBB SHADOW E&M-EST. PATIENT-LVL V: CPT | Mod: PBBFAC,HCNC,, | Performed by: PHYSICIAN ASSISTANT

## 2025-04-15 RX ORDER — METHYLPREDNISOLONE ACETATE 40 MG/ML
40 INJECTION, SUSPENSION INTRA-ARTICULAR; INTRALESIONAL; INTRAMUSCULAR; SOFT TISSUE
Status: COMPLETED | OUTPATIENT
Start: 2025-04-15 | End: 2025-04-15

## 2025-04-15 RX ADMIN — METHYLPREDNISOLONE ACETATE 40 MG: 40 INJECTION, SUSPENSION INTRA-ARTICULAR; INTRALESIONAL; INTRAMUSCULAR; SOFT TISSUE at 11:04

## 2025-04-15 NOTE — PROGRESS NOTES
Established Patient Interventional Pain Clinic Visit    PCP: Laron Chaudhari MD      Chief Complaint   Patient presents with    Low-back Pain       Notes:    Subjective-    Interval History (4/15/2025):  Shireen Felix presents today for follow-up visit.  Patient was last seen on 8/1/2024.  Patient reports pain as  7.5/10  today.   Patient was admitted after 12/31/24 due to COVID-19. See hospital records below:        She recently completed home therapy/ physical therapy with [facility]. Patient c/o bilateral lower back pain. Since she started using her Rolator again, she is having more pain. She requests injections today (trigger point injections).      Interval History (8/1/2024):   Shireen Felix presents today for follow-up visit.  Patient was last seen on 4/29/2024. Patient is here today for trigger point injections.  Patient reports pain as 10/10 today. She is no longer taking Gabapentin due to side effects.  At this time she is taking Tizanidine as needed.  Patient was hospitalized recently due to CHF and gastroenteritis. See note below. She has home health and physical therapy.        Interval History (4/29/2024):  Shireen Felix presents today for follow-up visit.  Patient he is seen today for back pain.  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  She recently finished up a steroid taper as well as antibiotics.  She has some upcoming appointments to follow up with Cardiology.  She states while in the hospital she had a lot of coughing but she feels made her lower back and upper back pain worse.  She feels like she is having diffuse spasms in the back.  At times she will have pain that radiates down the back of the legs.  She has pain over bilateral hips especially never lying on either side.  She continues to take her gabapentin 300 mg 3 times a day reports she has not been taking the tizanidine.  She is inquiring about injections  today.  She also asked for hydrocodone to help with her pain as this is what gave her relief in the hospital.  Patient denies night fever/night sweats, urinary incontinence, bowel incontinence, significant weight loss and significant motor weakness.   Patient denies any other complaints or concerns at this time.      Interval history 02/27/2024  Patient presents status post C6-7 interlaminar epidural steroid injection with right paramedian approach 02/07/2024.  Patient reports approximately 70%-80% sustained relief in neck, upper extremity radicular symptoms following epidural steroid injection.  She reports significant improvement in radicular symptoms, numbness and tingling in bilateral arms and compromise in hand  strength and dexterity.  Today she reports residual pain particularly in the left shoulder territory.  Of note patient has not received glenohumeral joint injection since 2020 with Dr. Dias.  At that time she does report noticeable improvement exceeding 3 months in duration with her prior procedure.  Today she does report pain is exacerbated with overhead movements and shoulder internal/external rotation.  She is continued physician directed physical therapy exercises over the last 8 weeks from 12/27/2023 through 02/27/2024 with noticeable improvement in pain, range of motion and functionality and neck and arms and shoulders.    Interval History (4/15/2025):  Shireen Felix presents today for follow-up visit.  Patient was last seen on 11/8/2023. At that visit, the plan was to perform TPI on upper back and return to clinic 3-4 weeks for TPI on lower back. Patient reports pain as 8/10 today.  Patient reports previous TPI did help but she continues to have pain on R side of upper back, bilateral arm pain and numbness/tingling in both hands. She also has pain involving R shoulder joint.     Interval History (11/08/2023):  Shireen Felix presents today for follow-up visit.  she underwent  sacrococcygeal joint injection on 12/31/24.  The patient reports that she is/was better following the procedure.  she reports 80% pain relief.  The changes lasted so far.  The changes have continued through this visit. The patient now c/o pain related to myofascial pain in upper back. She also pain in lower back (lumbar paraspinal) but the upper back pain is most concerning at this time.  Patient reports pain as 8/10 today.  The patient also c/o itching in her scalp with hair loss over the past two months.    Interval history 10/11/2023  Patient presents status post bilateral sacroiliac joint and greater trochanteric bursa injection 07/28/2023.  Patient reports approximately 90% sustained relief overlying bilateral sacroiliac joint and GT bursa.  Today she presents following a mechanical fall.  Patient reports approximately 5 days prior going to the bathroom in the middle of the night, losing her balance and hitting the back of her head and lower back.  Today she reports pain which is constant which is rated an 8/10.  Patient reports pain along bilateral cervical paraspinous musculature which is exacerbated with cervical flexion/extension and lateral flexion.  She reports this pain has improved over the last few days.  Her primary concern is pain along her coccyx.  Patient reports tenderness to touch in pain with sitting as well as sitting or standing.  Patient is requesting x-rays for evaluation.  She also would like to restart physical therapy.  Of note she is going to [facility] in [place] and would like to restart dry needling, soft massage and assistance with balance.    Interval History (6/27/2023):  Shireen Jeffreyrick Felix presents today for follow-up visit.  Patient was last seen on 5/16/2023. She reports some improvement in her pain with TPIs given at last visit. She reports continued variations in her BP throughout the day. She has followed up with Neurology who has made changes to her seizure medications. Seems to  be tolerating med change well. Continues to monitor BP. Has followed up with PCP and other specialist to rule out other causes for BP changes (renal, intracranial, etc).  Patient reports pain as 7/10 today. No changes on CT compared to previous.  Interval History (6/14/2023):  Shireen Felix presents today for trigger point injections.  Patient was last seen on 06/09/2023. Patient reports pain as 8/10 today. She continues to report pain as primarily located in her lower lumbosacral region with radiation into her buttocks, right worse than left, and wrapping around her lateral hips. She reports additional falls since last visit. She reports worsening neck pain with radiation into her left arm down to her hand with cramping and burning.  She also reports worsening symptoms down both legs.  interfering with her ability to walk at times due to significant weakness. She reports repeated falls due to her legs giving out from underneath her unexpectedly.       Interval History (6/9/2023):  Shireen Felix presents today via telemed for follow-up visit.  Patient was last seen on 05/16/2023. Last injection bilateral L4/5 TFESI on 4/26/23 with 80% relief. Patient reports pain as 10/10 today. She reports pain that began over the past several weeks, became severe 2 days ago. She reports pain is primarily located in her lower lumbosacral region with radiation into her buttocks, right worse than left, and wrapping around her lateral hips. Pain is similar to that prior to SIJ and GT bursa injection, last SIJ injection 11/16/2021 with excellent relief until a few weeks ago when pain returned. She reports in the past she has also received trigger point injections in her lower back that were very helpful.    Interval History (5/16/2023): Shireen Felix presents today for follow-up visit.  she underwent bilateral L4/5 TFESI on 4/26/23.  The patient reports that she is/was better following the procedure.  she reports 80%  pain relief.  The changes lasted 4 weeks so far.  The changes have continued through this visit.  Patient reports pain as 7/10 today. She reports the procedure itself was very painful. Reports she did not feel sedated at all compared to previous procedures with other providers. She is not interested in pursuing additional procedures secondary to the painful procedure.  She reports worsening neck pain with radiation into her right arm down to her hand with cramping and burning.    Interval History (3/9/2023):  Shireen Felix presents today for follow-up visit for CT and EMG review.  Patient was last seen on 2/1/2023. At that visit, the plan was to order updated CT of her cervical and lumbar spine as well as EMG of her bilateral upper extremities. Patient reports pain as 7/10 today.  She reports worsening neck pain with radiation into her left arm down to her hand with cramping and burning.  She also reports worsening symptoms down both legs.  interfering with her ability to walk at times due to significant weakness. Has an appointment with Dr. Navarro on 03/21/2023. Patient fell hit head on concrete 2-11-23, and had a seizure shortly after.  She reports repeated falls due to her legs giving out from underneath her unexpectedly. Referred to Neurology.  She is also currently on antibiotics secondary to having 10 teeth pulled     Interval History (1/31/2023):  Shireen Felix presents today for follow-up visit.  Patient was last seen on 10/4/2022. At that visit, the plan was to schedule bilateral L3-5 MBB followed 2 weeks later by XANDER.  She canceled these procedures and reports at this time she is not interested in scheduling.  She reports she has a  and goes to the gym 3-4 times per week which includes walking on a treadmill.   Patient reports pain as 7/10 today. She reports worsening neck pain with radiation into her left arm down to her hand with cramping and burning. This has been severe  over the past week. Last cervical CT in 2019, she reports she has sustained multiple falls since then. She also reports worsening symptoms down her left leg, interfering with her ability to walk at times due to significant weakness.      Interval history 10/04/2022  Ms. Felix is a 76-year-old female with past medical history significant for cerebral aneurysm status post craniotomy and repair, cerebral vascular accident, left V1 distribution post herpetic neuralgia, depression, glaucoma, asthma/COPD, coronary artery disease, GERD, nicotine dependence, type 2 diabetes who presents to [facility] care, previous Dr. Dias and Dr. Fulton patient.  Today patient reports pain in the lower back and legs.  Pain is constant today is rated 7/10.  Patient reports pain in a bandlike distribution in the lower back which radiates down the posterior aspects of bilateral lower extremities in L5-S1 distribution to mid thigh.  Pain is described as burning and aching in nature.  Pain is exacerbated when moving from sitting to standing and standing and ambulating just a few feet.  Patient does report associated weakness in the lower extremities associated with her pain.  Patient reports she is able to ambulate with assistive device, walker only a few feet before requiring rest.  Pain has been improved with prior procedures, including medial branch block and transforaminal epidural steroid injection.  Patient is interested in pursuing repeat injection.  Of note patient has trialed medicinal marijuana with Dr. Mckeon and reports palpitations side effect.  Patient has discontinued tramadol, tizanidine and gabapentin.  Pain is improved with prednisone which she takes prescribed by her pulmonologist.    Interval history:  Dr. Fulton:  05/10/2021-05/06/2022  Shireen Felix is a 76 y.o. female  who is presenting with a chief complaint of Neck Pain. The patient began experiencing this problem insidiously, and the pain has been gradually  worsening over the past 6 month(s). The pain is described as throbbing, cramping, aching and heavy and is located in the bilateral cervical spine. Pain is intermittent and lasts hours. The  pain is nonradiating. The patient rates her pain a 7 out of ten and interferes with activities of daily living a 7 out of ten. Pain is exacerbated by rotation of the cervical spine, and is improved by rest. Patient reports no prior trauma, no prior spinal surgery      This patient is a 74 y.o. female who presents today complaining of the above noted pain/s. The patient describes the pain as follows.  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  She reports approximately 2 years ago she had to sudden deaths in the family which caused very stressful time for her and resulted in shingles rash in the left V1 distribution however she reports having excruciating pain in the left V1 and V2 distributions.  She denies having difficulty with eating food and brushing her teeth on the left side of her face however the left lateral side of her nose gets her excruciating pain.  She has been tried on numerous medications in the past including gabapentin, Lyrica, Valtrex, Celebrex, ibuprofen which have all provided minimal benefit however steroids have been most helpful.  Today she rates her pain as an 4/10 and describes a constant burning sensation the left side of her face in addition to radiation into her bilateral lower extremities, her right shoulder, her left upper extremity occasionally as a pins and needle sensation.  She does report having numbness and weakness in her left lower extremity.  She has been physical therapy which she completed in February of 2019 however this caused most of her low back and leg symptoms to worsen in addition to her post herpetic neuralgia to worsen.  She denies having bowel  bladder difficulties.  Her symptoms are worse with activity such as walking in her somewhat improved with rest however she had finds it difficult to get into a comfortable position. She has been using topical lidocaine patches and applying to the left side of her face which does provide significant benefit.  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     Shireen Felix is a 76 y.o. female  who is presenting with a chief complaint of lumbar spine. The patient began experiencing this problem insidiously, and the pain has been gradually worsening over the past 2 year(s). The pain is described as throbbing, shooting, burning, aching and electrical and is located in the bilateral lumbar spine. Pain is intermittent and lasts hours. The pain radiates to bilateral lower extremities. The patient rates her pain a 7 out of ten and interferes with activities of daily living a 6 out of ten. Pain is exacerbated by flexion of the lumbar spine, and is improved by rest. Patient reports no prior trauma, no prior spinal surgery     Interval HPI 06/03/2020: Dr. Dias:  Ms. Felix returns to clinic for follow-up.  She underwent a T12-L1 transforaminal epidural steroid injection on March 10, 2020 and she reports that this has provided significant pain relief for her which radiated into her right leg prior to the injection.  She does report that his symptoms have returned and the injections worn off her pain is currently rated 7/10.  She was scheduled to undergo bilateral lumbar radiofrequency ablation prior to corona virus however she would like to hold off on that at this time and pursue repeat injection in the low back.  Unfortunately she also reports that she had her wheelchair fall over a few weeks ago and this has resulted in bilateral shoulder pain.  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  She finds that rest does provide some pain relief on activity causes her symptoms to worsen.  She has been able to walk significantly more after her most recent injection in March reported she is able to walk several steps before having to sit rest however this has a vast improvement from prior to the injection.    Interval HPI:  02/12/2020; Dr. Dias  Ms. Felix returns to clinic for follow-up.  She underwent bilateral lumbar medial branch blocks on January 31, 2020 and reports 100% symptomatic pain relief for approximately 1 week and his symptoms slowly began to return.  She continues to have some right-sided lower thoracic posterior pain which was not completely dressed with the medial branch blocks.  Today she rates her pain as an 8/10 which is located primarily in the axial lumbar spine and the lower right posterior thoracic region.  She denies having numbness weakness in the legs but does continue to have a constant aching and throbbing sensation in the low back.    Interval HPI 01/23/2020: Dr. Dias: Ms. Felix returns to clinic for follow-up.  She reports that she underwent bilateral L4/5 transforaminal epidural steroid injections in November 2019 reports ongoing 80% symptomatic pain relief.  She has also been participating physical therapy which she has found to be very helpful however 4 days ago she started to do some leg raise is in her bed and she felt severe pain in her low back which has not subsided.  She denies having any radiation except for around her flank into her anterior abdomen.  She feels like the pain sometimes comes straight through from her back to her abdomen.  Today she rates her pain as a 10/10 describes it as a constant aching and sharp pain. She has been using a heating pad which is minimally helpful and she has been holding off on physical therapy at this time. She denies  having bowel bladder difficulty.     Interval History: Patient was seen on 8/4/19. At that time she underwent bilateral L3/4 TF XANDER.  The patient reports that she is/was better following the procedure.  she reports 75% pain relief.  She had a fall, then pain increased. She is currently living at [facility]. She is doing at home therapy there, which is helping. She is in wheelchair now but hopes to transition to walker soon.  After the fall, she was not able to get out of the bed.      Initial History of Present Illness:  Dr. Dias  This patient is a 74 y.o. female who presents today complaining of the above noted pain/s. The patient describes the pain as follows.  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  She reports approximately 2 years ago she had to sudden deaths in the family which caused very stressful time for her and resulted in shingles rash in the left V1 distribution however she reports having excruciating pain in the left V1 and V2 distributions.  She denies having difficulty with eating food and brushing her teeth on the left side of her face however the left lateral side of her nose gets her excruciating pain.  She has been tried on numerous medications in the past including gabapentin, Lyrica, Valtrex, Celebrex, ibuprofen which have all provided minimal benefit however steroids have been most helpful.  Today she rates her pain as an 8/10 and describes a constant burning sensation the left side of her face in addition to radiation into her bilateral lower extremities, her right shoulder, her left upper extremity occasionally as a pins and needle sensation.  She does report having numbness and weakness in her left lower extremity.  She has been physical therapy which she completed in February of 2019 however this caused most of her low back and leg symptoms to worsen  in addition to her post herpetic neuralgia to worsen.  She denies having bowel bladder difficulties.  Her symptoms are worse with activity such as walking in her somewhat improved with rest however she had finds it difficult to get into a comfortable position. She has been using topical lidocaine patches and applying to the left side of her face which does provide significant benefit.  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    - pertinent negatives: No fever, No chills, No weight loss, No bladder dysfunction, No bowel dysfunction, No saddle anesthesia  - pertinent positives: generalized nonspecific Lower Extremity weakness bilaterally    - medications, other therapies tried (physical therapy, injections):     >> NSAIDs, Tylenol, Tramadol, Norco, gabapentin, lyrica, flexeril and medrol dose pack    >> Has previously undergone Physical Therapy    Pain injections:  Dr. Oshea:  -02/07/2024: C6-7 interlaminar epidural steroid injection with right paramedian approach  -10/25/2023: Sacrococcygeal joint injection with 80% relief  -07/28/2023: Bilateral sacroiliac joint and greater trochanteric bursa injection  -04/26/2023: bilateral L4/5 TFESI with 80% relief    -11/16/2021: Right-sided SI joint and greater trochanteric bursa injection; Dr. Fulton  -07/09/2020: Bilateral glenohumeral joint injection; Dr. Dias  -06/19/2020: Right-sided T12/L1 transforaminal epidural steroid injection; Dr. Dias  -03/10/2020:  T12/L1 transforaminal epidural steroid injection; Dr. Dias  -01/31/2020: Bilateral L3-5 medial branch blocks; Dr. Dias  -11/12/2019: Bilateral L4/5 transforaminal epidural steroid injection; Dr. Dias    Imaging / Labs / Studies (reviewed on 4/15/2025):  Thoracic X-rays: 4/22/24  XR THORACIC SPINE AP LATERAL   CLINICAL HISTORY:  Pain in thoracic spine   TECHNIQUE:  AP and lateral  views of the thoracic spine   COMPARISON:  03/15/2022   FINDINGS:  There is mild dextrocurvature of the mid to lower thoracic spine.  The vertebral bodies demonstrate a normal height.  The disc space heights appear to be relatively well maintained. The pedicles are intact.  Minimal spondylosis noted at a few levels within the upper thoracic spine and lower thoracic spine.  There is significant disc space narrowing along with some spondylosis present at the L1-2 and L3-4.     Impression:   As above      Electronically signed by:David Wallace DO  Date:                                            04/22/2024  Time:       10/11/23    X-Ray Cervical Spine 5 View W Flex Extxt    Narrative    FINDINGS:  There is no acute fracture or compression deformity. Bones are demineralized. No aggressive lytic or blastic lesion seen.  Mild cervical dextrocurvature noted.  There is mild straightening of the normal cervical lordosis as well as mild anterolisthesis of C4 on C5.  No evidence of instability with flexion/extension.  Remaining alignment is unremarkable.  There is multilevel loss of intervertebral disc height with associated uncovertebral hypertrophy and endplate ossified ptosis, most prominent at C5-C6 and C6-C7.  More mild degenerative disc changes at C3-C4 and C4-C5. Multilevel bilateral facet arthropathy also noted. No gross bony spinal canal stenosis.  Moderate to severe left neuroforaminal stenosis at the C5-C6 and C6-C7 levels.  Additional mild neuroforaminal stenosis at the mid to upper cervical levels.  Left carotid calcifications noted.  Visualized soft tissues are otherwise unremarkable.    Impression  Mild cervical thoracic curvature and straightening of the normal cervical lordosis.  Mild anterolisthesis of C4 on C5 without evidence of instability.  Osteopenia and multilevel degenerative changes as above.      Sacrum/Coccyx X-rays:  EXAM: XR SACRUM AND COCCYX   HISTORY: Pain   FINDINGS:   3 views were obtained of  the sacrum/coccyx.   Bilateral hip replacement hardware.  Degenerative changes within the visualized lumbar spine.  No visualized fracture.   The bones are osteopenic.      Impression:   No acute findings are identified.      CT Cervical spine 06/23/2023  FINDINGS:  There is unchanged grade 1 anterolisthesis of C3 on C4 and C4 on C5.  There is no new spondylolisthesis.  There is no loss of vertebral body height.  There is multilevel degenerative disc space narrowing most significant at C5-C6 and C6-C7.  There is unchanged diffuse degenerative facet arthropathy with posterior disc osteophyte complexes and uncovertebral osteophytosis resulting in varying degrees of canal and foraminal stenosis.  This is incompletely evaluated on this exam.     The included portions of the posterior fossa are normal.  The craniocervical junction is symmetric.  The atlantoaxial articulation is normal.  The airway is widely patent.  The thyroid gland is normal.  There is no cervical adenopathy.  The visualized lung apices are clear.     Impression:     Multilevel degenerative spondylolisthesis and spondylosis resulting in varying degrees of canal and foraminal stenosis, grossly unchanged from prior exam.    CT lumbar spine 06/23/2023  FINDINGS:  Five non-rib-bearing lumbar type vertebral bodies are present.  There is mild Levoscoliotic curvature of the lumbar spine centered at L3, similar compared to the prior.     With regards to alignment, there is minimal right lateral listhesis of L1 on L2 and L2 on L3.  There is minimal left lateral listhesis of L3 on L4.  No anterolisthesis or retrolisthesis.     There are chronic compression deformities through the superior endplate of T12, inferior endplate of L1 and superior endplate of L2.  Degree of vertebral body height loss is stable since 02/07/2023.  Vertebral body heights from L3-L5 are preserved.     Mild to severe degenerative disc changes are present throughout the lumbar spine, worst  at the right half of L3-L4 and L4-L5.  Partially calcified disc bulges are seen at L2-L3, L3-L4, L4-L5 and L5-S1.  Evaluation for central canal narrowing is limited without intrathecal contrast.  Degrees of central canal stenosis appear worst at L3-L4 and L4-L5.     Hypertrophic facet changes are present throughout the lumbar spine, worst at L3-L4 and L4-L5.     Paraspinal soft tissues appear within normal limits.  Both SI joints appear intact with mild degenerative changes, similar compared to the prior.     Impression:     1. Multilevel degenerative disc and hypertrophic facet changes similar compared to 02/07/2023.  2. Chronic appearing compression deformities at T12, L1 and L2, similar compared to 02/07/2023.  Dense of acute injury.      01/23/20    X-Ray Lumbar Spine Ap And Lateral  FINDINGS:  Levoscoliosis present.  Vertebral body heights stable.  Alignment unchanged without spondylolisthesis.  Similar appearing multilevel degenerative disc height loss and osteophyte findings noted.  Multilevel facet degenerative findings remain.  Aorta iliac atherosclerotic calcification.  Colonic constipation findings present.    EMG 02/24/2023  IMPRESSION  1. ABNORMAL study  2. 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  There is an acute on chronic radiculopathy of the LEFT C6 nerve root and a subacute on chronic of the RIGHT C4 and C5 nerve roots and a CHRONIC radiculopathy of bilateral C4-T1 nerve roots    Review of Systems:  CONSTITUTIONAL: patient denies any fever or  chills.   MUSCULOSKELETAL:  - patient complains of the above noted pain/s (see chief pain complaint)  NEUROLOGICAL:   - pain as above  - strength in Upper and Lower extremities is decreased,  "BILATERALLY  - sensation in Upper extremities is intact, BILATERALLY  - patient denies any loss of bowel or bladder control      PSYCHIATRIC: patient denies any change in mood    Other:  All other systems reviewed and are negative      Physical Exam:  /62   Pulse 64   Ht 5' 4" (1.626 m)   Wt 52.6 kg (115 lb 15.4 oz)   LMP  (LMP Unknown)   BMI 19.90 kg/m²    (reviewed on 4/15/2025)  GENERAL:  Patient is in no acute distress, alert and oriented x3.  PSYCH:  Mood and affect appropriate.  SKIN: Skin color normal.  HEAD/FACE:  Normocephalic, bruising along right lower jaw, Cranial nerves grossly intact.  GI:  Soft and non-tender.  BACK: Straight leg raising in the sitting and supine positions is negative to radicular pain. There is mild pain to palpation over the facet joints of the lumbar spine or spinous processes.  Reduced range of motion with pain reproduction.  TTP diffusely over lower lumbar- paraspinal ricky.  EXTREMITIES: No deformities, edema, or skin discoloration. Good capillary refill.  MUSCULOSKELETAL:  Unable to stand on heels and toes, patient in wheelchair today  SIJ testing:  - TTP over SI joint: Present, bilaterally  - Madhavi's/ Hank's: Positive    - TTP over Piriformis: Present, bilaterally  - TTP over GT Bursa: Present, bilaterally    NEURO: Bilateral upper and lower extremity coordination and muscle stretch reflexes are physiologic and symmetric. No loss of sensation is noted.  GAIT:  Patient presents in wheelchair today.  Antalgic gait.      Assessment:    Shireen Felix is a 79 y.o. year old female who is presenting with   Encounter Diagnoses   Name Primary?    Chronic pain disorder     Myofascial muscle pain Yes    Sacroiliac joint pain     Lumbar spondylosis    Plan:    1. Interventional:  Trigger point injections performed in clinic today. See procedure note below.   Explained the risks and benefits of the procedure in detail with the patient today in clinic along with " alternative treatment options, and the patient elected to pursue the intervention.        ---   Consider SI Joint, Piriformis and GT Bursa injections in the future.    Anticoagulation:  Yes, Plavix  --secondary prophylaxis: Coronary artery disease, diastolic heart failure; cardiologist: Dr. Dye    2. Pharmacologic:      report:  Reviewed and consistent with medication use as prescribed.      -No longer taking Gabapentin. Discontinued secondary to SE.   --Unable to take PO NSAIDs due to taking Plavix.    -Continue tizanidine 4-8 mg  BID prn pain.We have discussed potential deleterious side effects associated with this medication including  dizziness, drowsiness, dry mouth or tingling sensation in the upper or lower extremities.      3. Rehabilitative:   -We discussed continuing physician directed at home physical therapy to help manage the patient/s painful condition. The patient was counseled that muscle strengthening will improve the long term prognosis in regards to pain and may also help increase range of motion and mobility.   Patient recently completed hh/physical therapy .    4. Diagnostic:  Previous imaging studies reviewed.    5. Consults: (PRN)  Continue follow up with PCP and specialists for hospital follow.    6.  Follow up: can return to clinic in 4 weeks to reassess, virtual visit is appropriate.    Augustin Nichols PA-C  Interventional Pain Medicine        The above plan and management options were discussed at length with patient. Patient is in agreement with the above and verbalized understanding.  - I discussed the goals of interventional chronic pain management with the patient on today's visit. We discussed a multimodal and systematic approach to pain.  This includes diagnostic and therapeutic injections, adjuvant pharmacologic treatment, physical therapy, and at times psychiatry.  I emphasized the importance of regular exercise, core strengthening and stretching, diet and weight loss as a  cornerstone of long-term pain management.    - This condition does not require this patient to take time off of work, and the primary goal of our Pain Management services is to improve the patient's functional capacity.  - Patient Questions: Answered all of the patient's questions regarding diagnoses, therapy, treatment and next steps      Procedure note for  Trigger Point Injection:   The procedure was discussed with the patient including complications of nerve damage,  bleeding, infection, and failure of pain relief.   Trigger points were identified by palpation and marked. Alcohol swab prep of sites done. A mixture of 6mL 1% lidocaine + 40 mg Depo-Medrol was prepared (7 mL total).   A 25-gauge needle was advanced to the point of maximal tenderness, and the medication mixture  was injected after negative aspiration. Patient tolerated the procedure well and without complications. Patient was monitored for 15 min following the injection before discharged from the clinic.  Sites injected included:  reva lower lumbar spine (paraspinal ricky).

## 2025-04-29 DIAGNOSIS — J45.50 SEVERE PERSISTENT ASTHMA WITHOUT COMPLICATION: ICD-10-CM

## 2025-04-29 RX ORDER — TEZEPELUMAB-EKKO 210 MG/1.9ML
210 INJECTION, SOLUTION SUBCUTANEOUS
Qty: 1.91 ML | Refills: 11 | Status: ACTIVE | OUTPATIENT
Start: 2025-04-29

## 2025-05-12 DIAGNOSIS — I25.118 CORONARY ARTERY DISEASE OF NATIVE ARTERY OF NATIVE HEART WITH STABLE ANGINA PECTORIS: ICD-10-CM

## 2025-05-12 NOTE — TELEPHONE ENCOUNTER
Care Due:                  Date            Visit Type   Department     Provider  --------------------------------------------------------------------------------                                [facility]  Last Visit: 02-      FOLLOW UP    MEDICINE       Laron Chaudhari  Next Visit: None Scheduled  None         None Found                                                            Last  Test          Frequency    Reason                     Performed    Due Date  --------------------------------------------------------------------------------    HBA1C.......  6 months...  empagliflozin............  07- 01-    Lipid Panel.  12 months..  atorvastatin.............  07- 07-    Vitamin D...  12 months..  cholecalciferol,.........  Not Found    Overdue    [employer] Embedded Care Due Messages. Reference number: [id number].   5/12/2025 11:32:26 AM CDT

## 2025-05-13 RX ORDER — CLOPIDOGREL BISULFATE 75 MG/1
75 TABLET ORAL
Qty: 90 TABLET | Refills: 3 | Status: SHIPPED | OUTPATIENT
Start: 2025-05-13

## 2025-05-13 NOTE — TELEPHONE ENCOUNTER
Refill Routing Note   Medication(s) are not appropriate for processing by [facility] for the following reason(s):        Required labs abnormal    [facility] action(s):  Defer     Requires labs : Yes             Appointments  past 12m or future 3m with PCP    Date Provider   Last Visit   2/3/2025 Laron Chaudhari MD   Next Visit   Visit date not found Laron Chaudhari MD   ED visits in past 90 days: 0        Note composed:9:43 PM 05/12/2025

## 2025-05-16 DIAGNOSIS — R05.3 CHRONIC COUGH: Primary | ICD-10-CM

## 2025-05-16 RX ORDER — PROMETHAZINE HYDROCHLORIDE AND DEXTROMETHORPHAN HYDROBROMIDE 6.25; 15 MG/5ML; MG/5ML
5 SYRUP ORAL EVERY 8 HOURS PRN
Qty: 300 ML | Refills: 0 | Status: SHIPPED | OUTPATIENT
Start: 2025-05-16

## 2025-05-21 DIAGNOSIS — Z78.0 MENOPAUSE: ICD-10-CM

## 2025-05-26 ENCOUNTER — TELEPHONE (OUTPATIENT)
Dept: PAIN MEDICINE | Facility: CLINIC | Age: 80
End: 2025-05-26
Payer: MEDICARE

## 2025-05-27 ENCOUNTER — OFFICE VISIT (OUTPATIENT)
Dept: PAIN MEDICINE | Facility: CLINIC | Age: 80
End: 2025-05-27
Payer: MEDICARE

## 2025-05-27 DIAGNOSIS — M79.18 MYOFASCIAL MUSCLE PAIN: ICD-10-CM

## 2025-05-27 DIAGNOSIS — G89.4 CHRONIC PAIN DISORDER: Primary | ICD-10-CM

## 2025-05-27 DIAGNOSIS — M47.816 LUMBAR SPONDYLOSIS: ICD-10-CM

## 2025-05-27 PROCEDURE — 98004 SYNCH AUDIO-VIDEO EST SF 10: CPT | Mod: 95,,, | Performed by: PHYSICIAN ASSISTANT

## 2025-05-27 NOTE — PROGRESS NOTES
Established Patient Interventional Pain Clinic Visit      PCP: Laron Chaudhari MD    The patient location is: [place]  The chief complaint leading to consultation is: 4 wk follow up    Visit type: audiovisual    Face to Face time with patient: 5-10 minutes of total time spent on the encounter, which includes face to face time and non-face to face time preparing to see the patient (eg, review of tests), Obtaining and/or reviewing separately obtained history, Documenting clinical information in the electronic or other health record, Independently interpreting results (not separately reported) and communicating results to the patient/family/caregiver, or Care coordination (not separately reported).     Each patient to whom he or she provides medical services by telemedicine is:  (1) informed of the relationship between the physician and patient and the respective role of any other health care provider with respect to management of the patient; and (2) notified that he or she may decline to receive medical services by telemedicine and may withdraw from such care at any time.    Notes:    Subjective-      Interval History (5/27/2025):   Shireen Felix presents today for follow-up visit.  Patient was last seen on 4/15/2025. She had TPI of lower back at her last visit. Reports at least 60% relief. She is interested in having repeat injections next month. Patient reports pain as 7/10 today.    Interval History (4/15/25):  Shireen Felix presents today for follow-up visit.  Patient was last seen on 8/1/2024.  Patient reports pain as 7.5/10 today.   Patient was admitted after 12/31/24 due to COVID-19. See hospital records below:        She recently completed home therapy/ physical therapy with [facility]. Patient c/o bilateral lower back pain. Since she started using her Rolator again, she is having more pain. She requests injections today (trigger point injections).      Interval History (8/1/2024):   Shireen Martin  Isidro presents today for follow-up visit.  Patient was last seen on 4/29/2024. Patient is here today for trigger point injections.  Patient reports pain as 10/10 today. She is no longer taking Gabapentin due to side effects.  At this time she is taking Tizanidine as needed.  Patient was hospitalized recently due to CHF and gastroenteritis. See note below. She has home health and physical therapy.        Interval History (4/29/2024):  Shireen Felix presents today for follow-up visit.  Patient he is seen today for back pain.  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  She recently finished up a steroid taper as well as antibiotics.  She has some upcoming appointments to follow up with Cardiology.  She states while in the hospital she had a lot of coughing but she feels made her lower back and upper back pain worse.  She feels like she is having diffuse spasms in the back.  At times she will have pain that radiates down the back of the legs.  She has pain over bilateral hips especially never lying on either side.  She continues to take her gabapentin 300 mg 3 times a day reports she has not been taking the tizanidine.  She is inquiring about injections today.  She also asked for hydrocodone to help with her pain as this is what gave her relief in the hospital.  Patient denies night fever/night sweats, urinary incontinence, bowel incontinence, significant weight loss and significant motor weakness.   Patient denies any other complaints or concerns at this time.      Interval history 02/27/2024  Patient presents status post C6-7 interlaminar epidural steroid injection with right paramedian approach 02/07/2024.  Patient reports approximately 70%-80% sustained relief in neck, upper extremity radicular symptoms following epidural steroid injection.  She reports significant improvement in radicular symptoms, numbness and tingling in bilateral arms and  compromise in hand  strength and dexterity.  Today she reports residual pain particularly in the left shoulder territory.  Of note patient has not received glenohumeral joint injection since 2020 with Dr. Dias.  At that time she does report noticeable improvement exceeding 3 months in duration with her prior procedure.  Today she does report pain is exacerbated with overhead movements and shoulder internal/external rotation.  She is continued physician directed physical therapy exercises over the last 8 weeks from 12/27/2023 through 02/27/2024 with noticeable improvement in pain, range of motion and functionality and neck and arms and shoulders.    Interval History (5/27/2025):  Shireen Felix presents today for follow-up visit.  Patient was last seen on 11/8/2023. At that visit, the plan was to perform TPI on upper back and return to clinic 3-4 weeks for TPI on lower back. Patient reports pain as 8/10 today.  Patient reports previous TPI did help but she continues to have pain on R side of upper back, bilateral arm pain and numbness/tingling in both hands. She also has pain involving R shoulder joint.     Interval History (11/08/2023):  Shireen Felix presents today for follow-up visit.  she underwent sacrococcygeal joint injection on 12/31/24.  The patient reports that she is/was better following the procedure.  she reports 80% pain relief.  The changes lasted so far.  The changes have continued through this visit. The patient now c/o pain related to myofascial pain in upper back. She also pain in lower back (lumbar paraspinal) but the upper back pain is most concerning at this time.  Patient reports pain as 8/10 today.  The patient also c/o itching in her scalp with hair loss over the past two months.    Interval history 10/11/2023  Patient presents status post bilateral sacroiliac joint and greater trochanteric bursa injection 07/28/2023.  Patient reports approximately 90% sustained relief  overlying bilateral sacroiliac joint and GT bursa.  Today she presents following a mechanical fall.  Patient reports approximately 5 days prior going to the bathroom in the middle of the night, losing her balance and hitting the back of her head and lower back.  Today she reports pain which is constant which is rated an 8/10.  Patient reports pain along bilateral cervical paraspinous musculature which is exacerbated with cervical flexion/extension and lateral flexion.  She reports this pain has improved over the last few days.  Her primary concern is pain along her coccyx.  Patient reports tenderness to touch in pain with sitting as well as sitting or standing.  Patient is requesting x-rays for evaluation.  She also would like to restart physical therapy.  Of note she is going to [facility] in [place] and would like to restart dry needling, soft massage and assistance with balance.    Interval History (6/27/2023):  Shireen Felix presents today for follow-up visit.  Patient was last seen on 5/16/2023. She reports some improvement in her pain with TPIs given at last visit. She reports continued variations in her BP throughout the day. She has followed up with Neurology who has made changes to her seizure medications. Seems to be tolerating med change well. Continues to monitor BP. Has followed up with PCP and other specialist to rule out other causes for BP changes (renal, intracranial, etc).  Patient reports pain as 7/10 today. No changes on CT compared to previous.  Interval History (6/14/2023):  Shireen Felix presents today for trigger point injections.  Patient was last seen on 06/09/2023. Patient reports pain as 8/10 today. She continues to report pain as primarily located in her lower lumbosacral region with radiation into her buttocks, right worse than left, and wrapping around her lateral hips. She reports additional falls since last visit. She reports worsening neck pain with radiation into her left  arm down to her hand with cramping and burning.  She also reports worsening symptoms down both legs.  interfering with her ability to walk at times due to significant weakness. She reports repeated falls due to her legs giving out from underneath her unexpectedly.       Interval History (6/9/2023):  Shireen Felix presents today via telemed for follow-up visit.  Patient was last seen on 05/16/2023. Last injection bilateral L4/5 TFESI on 4/26/23 with 80% relief. Patient reports pain as 10/10 today. She reports pain that began over the past several weeks, became severe 2 days ago. She reports pain is primarily located in her lower lumbosacral region with radiation into her buttocks, right worse than left, and wrapping around her lateral hips. Pain is similar to that prior to SIJ and GT bursa injection, last SIJ injection 11/16/2021 with excellent relief until a few weeks ago when pain returned. She reports in the past she has also received trigger point injections in her lower back that were very helpful.    Interval History (5/16/2023): Shireen Felix presents today for follow-up visit.  she underwent bilateral L4/5 TFESI on 4/26/23.  The patient reports that she is/was better following the procedure.  she reports 80% pain relief.  The changes lasted 4 weeks so far.  The changes have continued through this visit.  Patient reports pain as 7/10 today. She reports the procedure itself was very painful. Reports she did not feel sedated at all compared to previous procedures with other providers. She is not interested in pursuing additional procedures secondary to the painful procedure.  She reports worsening neck pain with radiation into her right arm down to her hand with cramping and burning.    Interval History (3/9/2023):  Shireen Felix presents today for follow-up visit for CT and EMG review.  Patient was last seen on 2/1/2023. At that visit, the plan was to order updated CT of her cervical and  lumbar spine as well as EMG of her bilateral upper extremities. Patient reports pain as 7/10 today.  She reports worsening neck pain with radiation into her left arm down to her hand with cramping and burning.  She also reports worsening symptoms down both legs.  interfering with her ability to walk at times due to significant weakness. Has an appointment with Dr. Navarro on 03/21/2023. Patient fell hit head on concrete 2-11-23, and had a seizure shortly after.  She reports repeated falls due to her legs giving out from underneath her unexpectedly. Referred to Neurology.  She is also currently on antibiotics secondary to having 10 teeth pulled     Interval History (1/31/2023):  Shireen Felix presents today for follow-up visit.  Patient was last seen on 10/4/2022. At that visit, the plan was to schedule bilateral L3-5 MBB followed 2 weeks later by XANDER.  She canceled these procedures and reports at this time she is not interested in scheduling.  She reports she has a  and goes to the gym 3-4 times per week which includes walking on a treadmill.   Patient reports pain as 7/10 today. She reports worsening neck pain with radiation into her left arm down to her hand with cramping and burning. This has been severe over the past week. Last cervical CT in 2019, she reports she has sustained multiple falls since then. She also reports worsening symptoms down her left leg, interfering with her ability to walk at times due to significant weakness.      Interval history 10/04/2022  Ms. Felix is a 76-year-old female with past medical history significant for cerebral aneurysm status post craniotomy and repair, cerebral vascular accident, left V1 distribution post herpetic neuralgia, depression, glaucoma, asthma/COPD, coronary artery disease, GERD, nicotine dependence, type 2 diabetes who presents to [facility] care, previous Dr. Dias and Dr. Fulton patient.  Today patient reports pain in the lower back and  legs.  Pain is constant today is rated 7/10.  Patient reports pain in a bandlike distribution in the lower back which radiates down the posterior aspects of bilateral lower extremities in L5-S1 distribution to mid thigh.  Pain is described as burning and aching in nature.  Pain is exacerbated when moving from sitting to standing and standing and ambulating just a few feet.  Patient does report associated weakness in the lower extremities associated with her pain.  Patient reports she is able to ambulate with assistive device, walker only a few feet before requiring rest.  Pain has been improved with prior procedures, including medial branch block and transforaminal epidural steroid injection.  Patient is interested in pursuing repeat injection.  Of note patient has trialed medicinal marijuana with Dr. Mckeon and reports palpitations side effect.  Patient has discontinued tramadol, tizanidine and gabapentin.  Pain is improved with prednisone which she takes prescribed by her pulmonologist.    Interval history:  Dr. Fulton:  05/10/2021-05/06/2022  Shireen Felix is a 76 y.o. female  who is presenting with a chief complaint of Neck Pain. The patient began experiencing this problem insidiously, and the pain has been gradually worsening over the past 6 month(s). The pain is described as throbbing, cramping, aching and heavy and is located in the bilateral cervical spine. Pain is intermittent and lasts hours. The  pain is nonradiating. The patient rates her pain a 7 out of ten and interferes with activities of daily living a 7 out of ten. Pain is exacerbated by rotation of the cervical spine, and is improved by rest. Patient reports no prior trauma, no prior spinal surgery      This patient is a 74 y.o. female who presents today complaining of the above noted pain/s. The patient describes the pain as follows.  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  She reports approximately 2 years ago she had to sudden deaths in the family which caused very stressful time for her and resulted in shingles rash in the left V1 distribution however she reports having excruciating pain in the left V1 and V2 distributions.  She denies having difficulty with eating food and brushing her teeth on the left side of her face however the left lateral side of her nose gets her excruciating pain.  She has been tried on numerous medications in the past including gabapentin, Lyrica, Valtrex, Celebrex, ibuprofen which have all provided minimal benefit however steroids have been most helpful.  Today she rates her pain as an 4/10 and describes a constant burning sensation the left side of her face in addition to radiation into her bilateral lower extremities, her right shoulder, her left upper extremity occasionally as a pins and needle sensation.  She does report having numbness and weakness in her left lower extremity.  She has been physical therapy which she completed in February of 2019 however this caused most of her low back and leg symptoms to worsen in addition to her post herpetic neuralgia to worsen.  She denies having bowel bladder difficulties.  Her symptoms are worse with activity such as walking in her somewhat improved with rest however she had finds it difficult to get into a comfortable position. She has been using topical lidocaine patches and applying to the left side of her face which does provide significant benefit.  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     Shireen Felix is a 76 y.o. female  who is presenting with a chief complaint of lumbar spine. The patient began experiencing this problem insidiously, and the pain has been gradually worsening  over the past 2 year(s). The pain is described as throbbing, shooting, burning, aching and electrical and is located in the bilateral lumbar spine. Pain is intermittent and lasts hours. The pain radiates to bilateral lower extremities. The patient rates her pain a 7 out of ten and interferes with activities of daily living a 6 out of ten. Pain is exacerbated by flexion of the lumbar spine, and is improved by rest. Patient reports no prior trauma, no prior spinal surgery     Interval HPI 06/03/2020: Dr. Dias:  Ms. Felix returns to clinic for follow-up.  She underwent a T12-L1 transforaminal epidural steroid injection on March 10, 2020 and she reports that this has provided significant pain relief for her which radiated into her right leg prior to the injection.  She does report that his symptoms have returned and the injections worn off her pain is currently rated 7/10.  She was scheduled to undergo bilateral lumbar radiofrequency ablation prior to corona virus however she would like to hold off on that at this time and pursue repeat injection in the low back.  Unfortunately she also reports that she had her wheelchair fall over a few weeks ago and this has resulted in bilateral shoulder pain.  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  She finds that rest does provide some pain relief on activity causes her symptoms to worsen.  She has been able to walk significantly more after her most recent injection in March reported she is able to walk several steps before having to sit rest however this has a vast improvement from prior to the injection.    Interval HPI:  02/12/2020; Dr. Dias  Ms. Felix returns to clinic for follow-up.  She underwent bilateral lumbar medial branch blocks on January 31, 2020 and reports 100% symptomatic pain relief for approximately 1 week and his symptoms slowly began to return.  She  continues to have some right-sided lower thoracic posterior pain which was not completely dressed with the medial branch blocks.  Today she rates her pain as an 8/10 which is located primarily in the axial lumbar spine and the lower right posterior thoracic region.  She denies having numbness weakness in the legs but does continue to have a constant aching and throbbing sensation in the low back.    Interval HPI 01/23/2020: Dr. Dias: Ms. Felix returns to clinic for follow-up.  She reports that she underwent bilateral L4/5 transforaminal epidural steroid injections in November 2019 reports ongoing 80% symptomatic pain relief.  She has also been participating physical therapy which she has found to be very helpful however 4 days ago she started to do some leg raise is in her bed and she felt severe pain in her low back which has not subsided.  She denies having any radiation except for around her flank into her anterior abdomen.  She feels like the pain sometimes comes straight through from her back to her abdomen.  Today she rates her pain as a 10/10 describes it as a constant aching and sharp pain. She has been using a heating pad which is minimally helpful and she has been holding off on physical therapy at this time. She denies having bowel bladder difficulty.     Interval History: Patient was seen on 8/4/19. At that time she underwent bilateral L3/4 TF XANDER.  The patient reports that she is/was better following the procedure.  she reports 75% pain relief.  She had a fall, then pain increased. She is currently living at [facility]. She is doing at home therapy there, which is helping. She is in wheelchair now but hopes to transition to walker soon.  After the fall, she was not able to get out of the bed.      Initial History of Present Illness:  Dr. Dias  This patient is a 74 y.o. female who presents today complaining of the above noted pain/s. The patient describes the pain as follows.  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  She reports approximately 2 years ago she had to sudden deaths in the family which caused very stressful time for her and resulted in shingles rash in the left V1 distribution however she reports having excruciating pain in the left V1 and V2 distributions.  She denies having difficulty with eating food and brushing her teeth on the left side of her face however the left lateral side of her nose gets her excruciating pain.  She has been tried on numerous medications in the past including gabapentin, Lyrica, Valtrex, Celebrex, ibuprofen which have all provided minimal benefit however steroids have been most helpful.  Today she rates her pain as an 8/10 and describes a constant burning sensation the left side of her face in addition to radiation into her bilateral lower extremities, her right shoulder, her left upper extremity occasionally as a pins and needle sensation.  She does report having numbness and weakness in her left lower extremity.  She has been physical therapy which she completed in February of 2019 however this caused most of her low back and leg symptoms to worsen in addition to her post herpetic neuralgia to worsen.  She denies having bowel bladder difficulties.  Her symptoms are worse with activity such as walking in her somewhat improved with rest however she had finds it difficult to get into a comfortable position. She has been using topical lidocaine patches and applying to the left side of her face which does provide significant benefit.  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    - pertinent negatives: No fever, No chills, No weight loss, No bladder dysfunction, No bowel dysfunction, No saddle  anesthesia  - pertinent positives: generalized nonspecific Lower Extremity weakness bilaterally    - medications, other therapies tried (physical therapy, injections):     >> NSAIDs, Tylenol, Tramadol, Norco, gabapentin, lyrica, flexeril and medrol dose pack    >> Has previously undergone Physical Therapy    Pain injections:  Dr. Oshea:  -02/07/2024: C6-7 interlaminar epidural steroid injection with right paramedian approach  -10/25/2023: Sacrococcygeal joint injection with 80% relief  -07/28/2023: Bilateral sacroiliac joint and greater trochanteric bursa injection  -04/26/2023: bilateral L4/5 TFESI with 80% relief    -11/16/2021: Right-sided SI joint and greater trochanteric bursa injection; Dr. Fulton  -07/09/2020: Bilateral glenohumeral joint injection; Dr. Dias  -06/19/2020: Right-sided T12/L1 transforaminal epidural steroid injection; Dr. Dias  -03/10/2020:  T12/L1 transforaminal epidural steroid injection; Dr. Dias  -01/31/2020: Bilateral L3-5 medial branch blocks; Dr. Dias  -11/12/2019: Bilateral L4/5 transforaminal epidural steroid injection; Dr. Dias    Imaging / Labs / Studies (reviewed on 5/27/2025):  Thoracic X-rays: 4/22/24  XR THORACIC SPINE AP LATERAL   CLINICAL HISTORY:  Pain in thoracic spine   TECHNIQUE:  AP and lateral views of the thoracic spine   COMPARISON:  03/15/2022   FINDINGS:  There is mild dextrocurvature of the mid to lower thoracic spine.  The vertebral bodies demonstrate a normal height.  The disc space heights appear to be relatively well maintained. The pedicles are intact.  Minimal spondylosis noted at a few levels within the upper thoracic spine and lower thoracic spine.  There is significant disc space narrowing along with some spondylosis present at the L1-2 and L3-4.     Impression:   As above      Electronically signed by:David Wallace DO  Date:                                            04/22/2024  Time:       10/11/23    X-Ray Cervical Spine 5 View W Flex  Extxt    Narrative    FINDINGS:  There is no acute fracture or compression deformity. Bones are demineralized. No aggressive lytic or blastic lesion seen.  Mild cervical dextrocurvature noted.  There is mild straightening of the normal cervical lordosis as well as mild anterolisthesis of C4 on C5.  No evidence of instability with flexion/extension.  Remaining alignment is unremarkable.  There is multilevel loss of intervertebral disc height with associated uncovertebral hypertrophy and endplate ossified ptosis, most prominent at C5-C6 and C6-C7.  More mild degenerative disc changes at C3-C4 and C4-C5. Multilevel bilateral facet arthropathy also noted. No gross bony spinal canal stenosis.  Moderate to severe left neuroforaminal stenosis at the C5-C6 and C6-C7 levels.  Additional mild neuroforaminal stenosis at the mid to upper cervical levels.  Left carotid calcifications noted.  Visualized soft tissues are otherwise unremarkable.    Impression  Mild cervical thoracic curvature and straightening of the normal cervical lordosis.  Mild anterolisthesis of C4 on C5 without evidence of instability.  Osteopenia and multilevel degenerative changes as above.      Sacrum/Coccyx X-rays:  EXAM: XR SACRUM AND COCCYX   HISTORY: Pain   FINDINGS:   3 views were obtained of the sacrum/coccyx.   Bilateral hip replacement hardware.  Degenerative changes within the visualized lumbar spine.  No visualized fracture.   The bones are osteopenic.      Impression:   No acute findings are identified.      CT Cervical spine 06/23/2023  FINDINGS:  There is unchanged grade 1 anterolisthesis of C3 on C4 and C4 on C5.  There is no new spondylolisthesis.  There is no loss of vertebral body height.  There is multilevel degenerative disc space narrowing most significant at C5-C6 and C6-C7.  There is unchanged diffuse degenerative facet arthropathy with posterior disc osteophyte complexes and uncovertebral osteophytosis resulting in varying degrees of  canal and foraminal stenosis.  This is incompletely evaluated on this exam.     The included portions of the posterior fossa are normal.  The craniocervical junction is symmetric.  The atlantoaxial articulation is normal.  The airway is widely patent.  The thyroid gland is normal.  There is no cervical adenopathy.  The visualized lung apices are clear.     Impression:     Multilevel degenerative spondylolisthesis and spondylosis resulting in varying degrees of canal and foraminal stenosis, grossly unchanged from prior exam.    CT lumbar spine 06/23/2023  FINDINGS:  Five non-rib-bearing lumbar type vertebral bodies are present.  There is mild Levoscoliotic curvature of the lumbar spine centered at L3, similar compared to the prior.     With regards to alignment, there is minimal right lateral listhesis of L1 on L2 and L2 on L3.  There is minimal left lateral listhesis of L3 on L4.  No anterolisthesis or retrolisthesis.     There are chronic compression deformities through the superior endplate of T12, inferior endplate of L1 and superior endplate of L2.  Degree of vertebral body height loss is stable since 02/07/2023.  Vertebral body heights from L3-L5 are preserved.     Mild to severe degenerative disc changes are present throughout the lumbar spine, worst at the right half of L3-L4 and L4-L5.  Partially calcified disc bulges are seen at L2-L3, L3-L4, L4-L5 and L5-S1.  Evaluation for central canal narrowing is limited without intrathecal contrast.  Degrees of central canal stenosis appear worst at L3-L4 and L4-L5.     Hypertrophic facet changes are present throughout the lumbar spine, worst at L3-L4 and L4-L5.     Paraspinal soft tissues appear within normal limits.  Both SI joints appear intact with mild degenerative changes, similar compared to the prior.     Impression:     1. Multilevel degenerative disc and hypertrophic facet changes similar compared to 02/07/2023.  2. Chronic appearing compression deformities  at T12, L1 and L2, similar compared to 02/07/2023.  Dense of acute injury.      01/23/20    X-Ray Lumbar Spine Ap And Lateral  FINDINGS:  Levoscoliosis present.  Vertebral body heights stable.  Alignment unchanged without spondylolisthesis.  Similar appearing multilevel degenerative disc height loss and osteophyte findings noted.  Multilevel facet degenerative findings remain.  Aorta iliac atherosclerotic calcification.  Colonic constipation findings present.    EMG 02/24/2023  IMPRESSION  1. ABNORMAL study  2. 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  There is an acute on chronic radiculopathy of the LEFT C6 nerve root and a subacute on chronic of the RIGHT C4 and C5 nerve roots and a CHRONIC radiculopathy of bilateral C4-T1 nerve roots    Review of Systems:  CONSTITUTIONAL: patient denies any fever or  chills.   MUSCULOSKELETAL:  - patient complains of the above noted pain/s (see chief pain complaint)  NEUROLOGICAL:   - pain as above  - strength in Upper and Lower extremities is decreased, BILATERALLY  - sensation in Upper extremities is intact, BILATERALLY  - patient denies any loss of bowel or bladder control      PSYCHIATRIC: patient denies any change in mood    Other:  All other systems reviewed and are negative      Physical Exam:    Telemedicine Physical Exam:   There were no vitals filed for this visit.  There is no height or weight on file to calculate BMI.   (reviewed on 5/27/2025)     (Physical exam performed virtually with patient guided on specific actions and diagnostic maneuvers)  GENERAL: Well appearing, in no acute distress, alert and oriented x3.  Cooperative.  PSYCH:  Mood and affect appropriate.  SKIN: Skin color & texture with no  obvious abnormalities.    HEAD/FACE:  Normocephalic, atraumatic.    PULM:  No difficulty breathing. No nasal flaring. No obvious wheezing.  EXTREMITIES: No obvious deformities. Moving all extremities well, appears to have symmetric strength throughout.  MUSCULOSKELETAL: No obvious atrophy abnormalities are noted.   NEURO: No obvious neurologic deficit.   GAIT: sitting.     Physical Exam: last in clinic visit:    GENERAL:  Patient is in no acute distress, alert and oriented x3.  PSYCH:  Mood and affect appropriate.  SKIN: Skin color normal.  HEAD/FACE:  Normocephalic, bruising along right lower jaw, Cranial nerves grossly intact.  GI:  Soft and non-tender.  BACK: Straight leg raising in the sitting and supine positions is negative to radicular pain. There is mild pain to palpation over the facet joints of the lumbar spine or spinous processes.  Reduced range of motion with pain reproduction.  TTP diffusely over lower lumbar- paraspinal ricky.  EXTREMITIES: No deformities, edema, or skin discoloration. Good capillary refill.  MUSCULOSKELETAL:  Unable to stand on heels and toes, patient in wheelchair today  SIJ testing:  - TTP over SI joint: Present, bilaterally  - Madhavi's/ Hank's: Positive    - TTP over Piriformis: Present, bilaterally  - TTP over GT Bursa: Present, bilaterally    NEURO: Bilateral upper and lower extremity coordination and muscle stretch reflexes are physiologic and symmetric. No loss of sensation is noted.  GAIT:  Patient presents in wheelchair today.  Antalgic gait.      Assessment:    Shireen Felix is a 79 y.o. year old female who is presenting with   Encounter Diagnoses   Name Primary?    Chronic pain disorder Yes    Myofascial muscle pain     Lumbar spondylosis          Plan:    1. Interventional: None. Will follow up for repeat  Trigger point injections next month.      Anticoagulation:  Yes, Plavix  --secondary prophylaxis: Coronary artery disease, diastolic heart failure; cardiologist:  Dr. Dye    2. Pharmacologic:      report:  Reviewed and consistent with medication use as prescribed.      -No longer taking Gabapentin. Discontinued secondary to SE.   --Unable to take PO NSAIDs due to taking Plavix.    -Continue tizanidine 4-8 mg  BID prn pain.We have discussed potential deleterious side effects associated with this medication including  dizziness, drowsiness, dry mouth or tingling sensation in the upper or lower extremities.      3. Rehabilitative:   -We discussed continuing physician directed at home physical therapy to help manage the patient/s painful condition. The patient was counseled that muscle strengthening will improve the long term prognosis in regards to pain and may also help increase range of motion and mobility.   Patient recently completed hh/physical therapy .    4. Diagnostic:  Previous imaging studies reviewed.    5. Consults: (PRN)  No new referrals at this time.    6.  Follow up: can return to clinic on/after June 10th for repeat TPI. Extended visit please.    Augustin Nichols PA-C  Interventional Pain Medicine        The above plan and management options were discussed at length with patient. Patient is in agreement with the above and verbalized understanding.  - I discussed the goals of interventional chronic pain management with the patient on today's visit. We discussed a multimodal and systematic approach to pain.  This includes diagnostic and therapeutic injections, adjuvant pharmacologic treatment, physical therapy, and at times psychiatry.  I emphasized the importance of regular exercise, core strengthening and stretching, diet and weight loss as a cornerstone of long-term pain management.    - This condition does not require this patient to take time off of work, and the primary goal of our Pain Management services is to improve the patient's functional capacity.  - Patient Questions: Answered all of the patient's questions regarding diagnoses, therapy,  treatment and next steps

## 2025-05-29 ENCOUNTER — DOCUMENT SCAN (OUTPATIENT)
Dept: HOME HEALTH SERVICES | Facility: HOSPITAL | Age: 80
End: 2025-05-29
Payer: MEDICARE

## 2025-06-04 ENCOUNTER — OFFICE VISIT (OUTPATIENT)
Dept: OTOLARYNGOLOGY | Facility: CLINIC | Age: 80
End: 2025-06-04
Payer: MEDICARE

## 2025-06-04 VITALS — WEIGHT: 115.94 LBS | BODY MASS INDEX: 19.79 KG/M2 | HEIGHT: 64 IN

## 2025-06-04 DIAGNOSIS — R04.0 RECURRENT EPISTAXIS: ICD-10-CM

## 2025-06-04 DIAGNOSIS — J34.89 NASAL SEPTAL PERFORATION: Primary | ICD-10-CM

## 2025-06-04 DIAGNOSIS — J32.0 CHRONIC MAXILLARY SINUSITIS: ICD-10-CM

## 2025-06-04 PROCEDURE — 99999 PR PBB SHADOW E&M-EST. PATIENT-LVL IV: CPT | Mod: PBBFAC,,, | Performed by: OTOLARYNGOLOGY

## 2025-06-11 DIAGNOSIS — I50.43 ACUTE ON CHRONIC COMBINED SYSTOLIC AND DIASTOLIC CONGESTIVE HEART FAILURE: ICD-10-CM

## 2025-06-11 NOTE — TELEPHONE ENCOUNTER
Refill Routing Note   Medication(s) are not appropriate for processing by [facility] for the following reason(s):        Required labs outdated  Outside of protocol    [facility] action(s):  Defer  Route      Medication Therapy Plan: PRN LASIX USAGE OUTSIDE OF [facility] PROTOCOL      Appointments  past 12m or future 3m with PCP    Date Provider   Last Visit   2/3/2025 Laron Chaudhari MD   Next Visit   Visit date not found Laron Chaudhari MD   ED visits in past 90 days: 0        Note composed:2:43 PM 06/11/2025

## 2025-06-11 NOTE — TELEPHONE ENCOUNTER
No care due was identified.  [facility] Embedded Care Due Messages. Reference number: [id number].   6/11/2025 1:01:55 PM CDT

## 2025-06-12 RX ORDER — EMPAGLIFLOZIN 10 MG/1
10 TABLET, FILM COATED ORAL
Qty: 90 TABLET | Refills: 0 | Status: SHIPPED | OUTPATIENT
Start: 2025-06-12

## 2025-06-12 RX ORDER — FUROSEMIDE 20 MG/1
TABLET ORAL
Qty: 30 TABLET | Refills: 11 | Status: SHIPPED | OUTPATIENT
Start: 2025-06-12

## 2025-06-28 DIAGNOSIS — F33.1 MODERATE RECURRENT MAJOR DEPRESSION: ICD-10-CM

## 2025-06-28 NOTE — TELEPHONE ENCOUNTER
No care due was identified.  [facility] Embedded Care Due Messages. Reference number: [id number].   6/28/2025 1:08:41 PM CDT

## 2025-06-30 RX ORDER — ESCITALOPRAM OXALATE 20 MG/1
20 TABLET ORAL DAILY
Qty: 90 TABLET | Refills: 2 | Status: SHIPPED | OUTPATIENT
Start: 2025-06-30

## 2025-06-30 NOTE — TELEPHONE ENCOUNTER
Refill Decision Note   Normanrosa Isidro  is requesting a refill authorization.  Brief Assessment and Rationale for Refill:  Approve     Medication Therapy Plan:         Comments:     Note composed:9:31 AM 06/30/2025

## 2025-07-04 DIAGNOSIS — E11.22 CONTROLLED TYPE 2 DIABETES MELLITUS WITH STAGE 3 CHRONIC KIDNEY DISEASE, WITHOUT LONG-TERM CURRENT USE OF INSULIN: ICD-10-CM

## 2025-07-04 DIAGNOSIS — N18.30 CONTROLLED TYPE 2 DIABETES MELLITUS WITH STAGE 3 CHRONIC KIDNEY DISEASE, WITHOUT LONG-TERM CURRENT USE OF INSULIN: ICD-10-CM

## 2025-07-06 RX ORDER — CALCIUM CITRATE/VITAMIN D3 200MG-6.25
TABLET ORAL
Qty: 100 STRIP | Refills: 3 | Status: SHIPPED | OUTPATIENT
Start: 2025-07-06

## 2025-07-06 RX ORDER — LANCETS 33 GAUGE
EACH MISCELLANEOUS
Qty: 100 EACH | Refills: 3 | Status: SHIPPED | OUTPATIENT
Start: 2025-07-06

## 2025-07-06 NOTE — TELEPHONE ENCOUNTER
Refill Decision Note   Normanrosa Isidro  is requesting a refill authorization.  Brief Assessment and Rationale for Refill:  Approve     Medication Therapy Plan:         Comments:     Note composed:11:33 AM 07/06/2025           
No care due was identified.  [facility] Embedded Care Due Messages. Reference number: [id number].   7/04/2025 2:34:30 AM CDT  
[facility] Division of Hospital Medicine  Robert Bar MD  Pager (M-F, 5H-5P): [phone]  Other Times:  [id number]    Patient is a 60y old  Male who presents with a chief complaint of Spinal stenosis cervical region (28 Mar 2024 11:39)      HPI:  60M HTN, DM2, BPH, s/p spinal stenosis C7-T1 disectomy/fusion on  c/b urinary retention. Pain is rated 6/10; localized at surgical site, achy in nature, non-radiating, worse with movement, but improved with pain meds. Ambulating with PT well.    No F/C, N/V, CP, SOB, Cough, lightheadedness, dizziness, abdominal pain, diarrhea, dysuria.    Pain Symptoms if applicable:             	                         none	   mild         moderate         severe  Pain:	            6                0	    1-3	     4-6	         7-10  Location:	Surgical site  Modifying factors:	Worse with movement  Associated symptoms:	    Allergies    No Known Allergies    Intolerances        HOME MEDICATIONS: Reviewed    MEDICATIONS  (STANDING):  acetaminophen     Tablet .. 975 milliGRAM(s) Oral every 8 hours  amLODIPine   Tablet 2.5 milliGRAM(s) Oral daily  cyclobenzaprine 10 milliGRAM(s) Oral every 8 hours  dextrose 10% Bolus 125 milliLiter(s) IV Bolus once  dextrose 5%. 1000 milliLiter(s) (50 mL/Hr) IV Continuous <Continuous>  dextrose 5%. 1000 milliLiter(s) (100 mL/Hr) IV Continuous <Continuous>  dextrose 50% Injectable 25 Gram(s) IV Push once  dextrose 50% Injectable 12.5 Gram(s) IV Push once  glucagon  Injectable 1 milliGRAM(s) IntraMuscular once  hydrochlorothiazide 25 milliGRAM(s) Oral daily  insulin lispro (ADMELOG) corrective regimen sliding scale   SubCutaneous three times a day before meals  insulin lispro (ADMELOG) corrective regimen sliding scale   SubCutaneous at bedtime  pantoprazole    Tablet 40 milliGRAM(s) Oral before breakfast  polyethylene glycol 3350 17 Gram(s) Oral daily  senna 2 Tablet(s) Oral at bedtime  tamsulosin 0.4 milliGRAM(s) Oral at bedtime  traMADol 50 milliGRAM(s) Oral every 6 hours    MEDICATIONS  (PRN):  dextrose Oral Gel 15 Gram(s) Oral once PRN Blood Glucose LESS THAN 70 milliGRAM(s)/deciliter  magnesium hydroxide Suspension 30 milliLiter(s) Oral every 12 hours PRN Constipation  ondansetron Injectable 4 milliGRAM(s) IV Push every 6 hours PRN Nausea and/or Vomiting  oxyCODONE    IR 10 milliGRAM(s) Oral every 4 hours PRN Severe Pain (7 - 10)  oxyCODONE    IR 5 milliGRAM(s) Oral every 6 hours PRN Moderate Pain (4 - 6)      PAST MEDICAL & SURGICAL HISTORY:  Diabetes mellitus  type II, diagnosed in 2018      Hypertension      Snoring  MACEY precautions -- responds affirmatively to STOP BANG questionnaire      Enlarged prostate      H/O phimosis  2019      Arthropathy  right shoulder -- never worked up      H/O cholelithiasis  s/p lap cholecystectomy      Enlarged prostate  negatie biopsy      History of back surgery      S/P left rotator cuff repair      S/P right rotator cuff repair          SOCIAL HISTORY:  No tobacco/alcohol use/abuse.    FAMILY HISTORY:  FH: diabetes mellitus  father and brother        REVIEW OF SYSTEMS:    CONSTITUTIONAL: No fever, weight loss, or fatigue  EYES: No eye pain, visual disturbances, or discharge  ENMT:  No difficulty hearing, tinnitus, vertigo; No sinus or throat pain  NECK: No pain or stiffness  RESPIRATORY: No cough, wheezing, chills or hemoptysis; No shortness of breath  CARDIOVASCULAR: No chest pain, palpitations, dizziness, or leg swelling  GASTROINTESTINAL: No abdominal or epigastric pain. No nausea, vomiting, or hematemesis; No diarrhea or constipation. No melena or hematochezia.  GENITOURINARY: No dysuria, frequency, hematuria, or incontinence  NEUROLOGICAL: No headaches, memory loss, loss of strength, numbness, or tremors  SKIN: No itching, burning, rashes, or lesions   LYMPH NODES: No enlarged glands  ENDOCRINE: No heat or cold intolerance; No hair loss  MUSCULOSKELETAL: back pain  PSYCHIATRIC: No depression, anxiety, mood swings, or difficulty sleeping  HEME/LYMPH: No easy bruising, or bleeding gums  ALLERGY AND IMMUNOLOGIC: No hives or eczema    [] Unable to obtain due to poor mental status    Vital Signs Last 24 Hrs  T(C): 36.5 (2024 09:24), Max: 36.8 (2024 22:16)  T(F): 97.7 (2024 09:24), Max: 98.2 (2024 22:16)  HR: 92 (2024 09:24) (92 - 105)  BP: 123/82 (2024 11:26) (123/82 - 154/98)  BP(mean): 109 (2024 16:00) (96 - 109)  RR: 17 (2024 09:24) (12 - 20)  SpO2: 99% (2024 09:24) (95% - 100%)    Parameters below as of 2024 09:24  Patient On (Oxygen Delivery Method): room air      CAPILLARY BLOOD GLUCOSE      POCT Blood Glucose.: 158 mg/dL (2024 11:22)  POCT Blood Glucose.: 124 mg/dL (2024 07:08)  POCT Blood Glucose.: 169 mg/dL (2024 22:08)  POCT Blood Glucose.: 152 mg/dL (2024 17:01)  POCT Blood Glucose.: 182 mg/dL (2024 15:56)      PHYSICAL EXAM:    CONSTITUTIONAL: NAD, well-developed, well-groomed  EYES: PERRLA; conjunctiva and sclera clear  ENMT: Moist oral mucosa, no pharyngeal injection or exudates; normal dentition  NECK: Supple, no palpable masses; no thyromegaly  RESPIRATORY: Normal respiratory effort; lungs are clear to auscultation bilaterally  CARDIOVASCULAR: Regular rate and rhythm, normal S1 and S2, no murmur/rub/gallop; No lower extremity edema; Peripheral pulses are 2+ bilaterally  ABDOMEN: Nontender to palpation, normoactive bowel sounds, no rebound/guarding; No hepatosplenomegaly  MUSCULOSKELETAL:  Did not assess gait; no clubbing or cyanosis of digits; no joint swelling or tenderness to palpation  BACK:  PSYCH: A+O to person, place, and time; affect appropriate  NEUROLOGY: CN 2-12 are intact and symmetric; no gross sensory deficits   SKIN: No rashes; no palpable lesions, surgical dressing C/D/I    LABS:                        14.3   8.88  )-----------( 266      ( 2024 05:56 )             41.2     04-    140  |  103  |  13  ----------------------------<  148<H>  4.1   |  22  |  1.23    Ca    9.9      2024 05:56        Urinalysis Basic - ( 2024 06:41 )    Color: Yellow / Appearance: Clear / S.013 / pH: x  Gluc: x / Ketone: Negative mg/dL  / Bili: Negative / Urobili: 0.2 mg/dL   Blood: x / Protein: Negative mg/dL / Nitrite: Negative   Leuk Esterase: Negative / RBC: x / WBC x   Sq Epi: x / Non Sq Epi: x / Bacteria: x      CAPILLARY BLOOD GLUCOSE      POCT Blood Glucose.: 158 mg/dL (2024 11:22)      RADIOLOGY & ADDITIONAL STUDIES:    Imaging:   Personally Reviewed:  [ ] YES               EKG:   Personally Reviewed:  [ ] YES       Care Discussed with Consultant(s)/Other Providers:  Care Discussed with Primary Team.      [ ] Increased delirium risk  [ ] Delirium and other risks can be reduced by:          -early ambulation          -minimizing "tethers" - IV, oxygen, catheters, etc          -avoiding hypnotics and sedatives          -maintaining hydration/nutrition          -avoid anticholinergics - diphenhydramine, etc          -pain control          -supportive environment

## 2025-07-10 ENCOUNTER — TELEPHONE (OUTPATIENT)
Dept: ALLERGY | Facility: CLINIC | Age: 80
End: 2025-07-10
Payer: MEDICARE

## 2025-07-10 ENCOUNTER — OFFICE VISIT (OUTPATIENT)
Dept: ALLERGY | Facility: CLINIC | Age: 80
End: 2025-07-10
Payer: MEDICARE

## 2025-07-10 DIAGNOSIS — T50.905D ADVERSE EFFECT OF DRUG, SUBSEQUENT ENCOUNTER: ICD-10-CM

## 2025-07-10 DIAGNOSIS — J45.50 SEVERE PERSISTENT ASTHMA WITHOUT COMPLICATION: ICD-10-CM

## 2025-07-10 DIAGNOSIS — J31.0 CHRONIC NONALLERGIC RHINITIS: ICD-10-CM

## 2025-07-10 RX ORDER — CLINDAMYCIN HYDROCHLORIDE 300 MG/1
300 CAPSULE ORAL 3 TIMES DAILY
Qty: 21 CAPSULE | Refills: 0 | Status: SHIPPED | OUTPATIENT
Start: 2025-07-10 | End: 2025-07-17

## 2025-07-10 RX ORDER — PREDNISONE 20 MG/1
40 TABLET ORAL DAILY
Qty: 14 TABLET | Refills: 0 | Status: SHIPPED | OUTPATIENT
Start: 2025-07-10 | End: 2025-07-17

## 2025-07-10 NOTE — TELEPHONE ENCOUNTER
Called and got appointment rescheduled      Copied from CRM #[id number]. Topic: Appointments - Appointment Access  >> Jul 10, 2025 10:05 AM Felipa wrote:  .Type: Appointment Access (office must schedule)    Who Called: Shireen  Reason for Visit: appt   When does the patient need to be seen?: soon   Next Available Appointment: unknown   Would the patient rather a call back or a response via [facility]?Call   Best Call Back Number: .[phone]   Additional Information: Pt requesting another appt due to the pt was unable to work my [facility]

## 2025-07-10 NOTE — PROGRESS NOTES
The patient location is:  [place], [place]  The chief complaint leading to consultation is:  Follow-up appointment    Visit type: audiovisual    Face to Face time with patient:  10  30 minutes of total time spent on the encounter, which includes face to face time and non-face to face time preparing to see the patient (eg, review of tests), Obtaining and/or reviewing separately obtained history, Documenting clinical information in the electronic or other health record, Independently interpreting results (not separately reported) and communicating results to the patient/family/caregiver, or Care coordination (not separately reported).     Each patient to whom he or she provides medical services by telemedicine is:  (1) informed of the relationship between the physician and patient and the respective role of any other health care provider with respect to management of the patient; and (2) notified that he or she may decline to receive medical services by telemedicine and may withdraw from such care at any time.    Allergy and Immunology  Established Patient Clinic Note    Date: 7/10/2025  Chief Complaint   Patient presents with    Follow-up    Sinus Problem     History  Shireen Felix is a 79 y.o. female being seen for follow-up today.    Acute sinusitis  Frontal sinus pressure bilateral  Ordered prednisone and clindamycin  Educated on the risk of C diff with clindamycin  Patient to stop clindamycin if loose stools    Chronic Non-Allergic Rhinitis  Nasal Pruritus/Nasal Septal Perforation   PND not controled at this time  Patient did not receive olopatadine mometasone dual intranasal spray   [facility] at this time   Patient given phone number to [facility] for prescription      Severe Persistent Asthma/COPD  Pulmonary Fibrosis     Overall improvement in symptoms and QOL since starting Tezspire   [place] noting patient's overall improvement is due to multidisciplinary  interventions  Patient to continue inhalers and Tezspire at this time     Allergies, PMH, PSH, Social, and Family History were reviewed.    Medications Ordered Prior to Encounter[1]    Physical Examination  There were no vitals filed for this visit.  GENERAL:  female in no apparent distress and well developed and well nourished  HEAD:  Normocephalic, without obvious abnormality, atraumatic  EYES: sclera anicteric, conjunctiva normochromic  LUNGS: no tachypnea, retractions or cyanosis.  HEART: not examined.  ABDOMEN: not examined.  MUSCULOSKELETAL: no gross joint deformity or swelling.  NEURO: alert, oriented, normal speech, no focal findings or movement disorder noted.  SKIN: normal coloration and turgor, no rashes, no suspicious skin lesions noted.     Assessment/Plan:   Problem List Items Addressed This Visit       Chronic nonallergic rhinitis    Overview   - 04/29/2024: Serum IgE to Zone 6 Aeroallergens negative          Relevant Medications    olopatadine-mometasone 665-25 mcg/spray [place]    Drug reaction    Overview   - PCN/Bactrim to be discussed at next appointment  - Planning for potential oral challenge after discussion of risk v benefits          Relevant Medications    olopatadine-mometasone 665-25 mcg/spray [place]    Severe persistent asthma without complication    Relevant Medications    olopatadine-mometasone 665-25 mcg/spray Sp[place]     Acute sinusitis  Frontal sinus pressure bilateral  Ordered prednisone and clindamycin  Educated on the risk of C diff with clindamycin  Patient to stop clindamycin if loose stools    Chronic Non-Allergic Rhinitis  Nasal Pruritus/Nasal Septal Perforation   PND not controled at this time  Patient did not receive olopatadine mometasone dual intranasal spray   [facility] at [facility] time   Patient given [facility]      Severe Persistent Asthma/COPD  Pulmonary Fibrosis     Overall improvement in symptoms and QOL  since starting Tezspire   [place] noting patient's overall improvement is due to multidisciplinary interventions  Patient to continue inhalers and Tezspire at this time     Follow up:  Follow up in about 4 months (around 11/10/2025).    DISCLAIMER: This note was prepared with [employer] voice recognition transcription software. Garbled syntax, mangled pronouns, and other bizarre constructions may be attributed to that software system. While efforts were made to correct any mistakes made by this voice recognition program, some errors and/or omissions may remain in the note that were missed when the note was originally created.     Kesler Bourgoyne, MD [facility]  Allergy and Immunology       [1]   Current Outpatient Medications on File Prior to Visit   Medication Sig Dispense Refill    albuterol (PROVENTIL) 2.5 mg /3 mL (0.083 %) nebulizer solution Take 3 mLs (2.5 mg total) by nebulization every 4 (four) hours as needed for Wheezing. Rescue 180 mL 11    albuterol-budesonide (AIRSUPRA) 90-80 mcg/actuation Inhale 2 puffs into the lungs every 4 (four) hours as needed (wheezing, cough). 10.7 g 11    ALPRAZolam (XANAX) 0.25 MG tablet Take 1 tablet (0.25 mg total) by mouth 2 (two) times daily as needed for Anxiety. 60 tablet 0    atorvastatin (LIPITOR) 40 MG tablet TAKE 1 TABLET BY MOUTH ONCE DAILY 90 tablet 1    benzonatate (TESSALON) 200 MG capsule Take 1 capsule (200 mg total) by mouth 3 (three) times daily as needed for Cough. . 30 capsule 0    bisoprolol (ZEBETA) 5 MG tablet TAKE 1 TABLET BY MOUTH ONCE DAILY 30 tablet 11    blood-glucose meter kit To check BG 1 times daily, to use with insurance preferred meter 1 each 0    cholecalciferol, vitamin D3, 1,250 mcg (50,000 unit) capsule Take 1 capsule (50,000 Units total) by mouth every 7 days. 12 capsule 0    cholestyramine (QUESTRAN) 4 gram packet TAKE 1 PACKET BY MOUTH TWICE DAILY AS DIRECTED 60 packet 11    clopidogreL (PLAVIX) 75 mg tablet TAKE 1 TABLET BY MOUTH  ONCE DAILY 90 tablet 3    denosumab (PROLIA) 60 mg/mL Syrg every 6 (six) months.      dextromethorphan-guaiFENesin  mg (MUCINEX DM)  mg per 12 hr tablet Take 1 tablet by mouth 2 (two) times daily. 10 tablet 0    dicyclomine (BENTYL) 10 MG capsule Take 1 capsule (10 mg total) by mouth 2 (two) times daily. 10 capsule 0    ENTRESTO 24-26 mg per tablet TAKE 1 TABLET BY MOUTH TWICE DAILY 180 tablet 3    EScitalopram oxalate (LEXAPRO) 20 MG tablet Take 1 tablet (20 mg total) by mouth once daily. 90 tablet 2    fluocinonide (LIDEX) 0.05 % external solution Apply topically 2 (two) times daily. Use on itchy spots on scalp and ear 60 mL 2    fluticasone-umeclidin-vilanter (TRELEGY ELLIPTA) 200-62.5-25 mcg inhaler Inhale 1 puff into the lungs once daily. 60 each 11    furosemide (LASIX) 20 MG tablet TAKE 1 TABLET BY MOUTH ONCE DAILY AS NEEDED FOR EDEMA, SWELLING, FLUID BUILD UP, OR 3 POUND WEIGHT GAIN IN 24 HOURS 30 tablet 11    gabapentin (NEURONTIN) 300 MG capsule TAKE 1 CAPSULE BY MOUTH TWICE DAILY 60 capsule 11    hydrOXYzine HCL (ATARAX) 25 MG tablet TAKE 1 TABLET BY MOUTH EVERY EVENING 30 tablet 2    ipratropium (ATROVENT) 42 mcg (0.06 %) nasal spray 2 sprays by Each Nostril route 2 (two) times daily. 15 mL 11    isosorbide mononitrate (IMDUR) 120 MG 24 hr tablet TAKE 1 TABLET BY MOUTH EVERY EVENING 90 tablet 3    JARDIANCE 10 mg tablet TAKE 1 TABLET BY MOUTH ONCE DAILY 90 tablet 0    ketoconazole (NIZORAL) 2 % cream Apply topically 2 (two) times daily. For dry flaky patches of ear. 30 g 1    latanoprost 0.005 % ophthalmic solution Place 1 drop into both eyes every evening. 2.5 mL 3    levETIRAcetam (KEPPRA) 1000 MG tablet Take 1 tablet (1,000 mg total) by mouth 2 (two) times daily. 60 tablet 11    LIDOcaine (LIDODERM) 5 % Place 1 patch onto the skin once daily. Remove & Discard patch within 12 hours or as directed by MD 30 patch 0    magnesium oxide (MAG-OX) 400 mg (241.3 mg magnesium) tablet Take 1 tablet  (400 mg total) by mouth once daily. 90 tablet 1    mucus clearing device (AEROBIKA OSCILLATING PEP SYSTM) by Misc.(Non-Drug; Combo Route) route 4 (four) times daily. 1 each 0    nitroGLYCERIN (NITROSTAT) 0.4 MG SL tablet Place 1 tablet (0.4 mg total) under the tongue every 5 (five) minutes as needed for Chest pain. 100 tablet 0    omeprazole (PRILOSEC) 40 MG capsule Take 1 capsule (40 mg total) by mouth every morning. 90 capsule 3    pantoprazole (PROTONIX) 40 MG tablet TAKE 1 TABLET BY MOUTH TWICE DAILY 60 tablet 11    potassium chloride SA (K-DUR,KLOR-CON) 20 MEQ tablet Take 1 tablet (20 mEq total) by mouth once daily. Take extra dose with Lasix - fluid pill 90 tablet 1    pramoxine-hydrocortisone (ANALPRAM HC) cream Apply topically 3 (three) times daily. 57 g 11    promethazine-dextromethorphan (PROMETHAZINE-DM) 6.25-15 mg/5 mL Syrp Take 5 mLs by mouth every 8 (eight) hours as needed (Cough). . 300 mL 0    ranolazine (RANEXA) 1,000 mg Tb12 Take 1 tablet (1,000 mg total) by mouth 2 (two) times daily. 180 tablet 3    roflumilast (DALIRESP) 500 mcg Tab Take 1 tablet (500 mcg total) by mouth once daily.      sodium chloride (OCEAN) 0.65 % nasal spray 1 spray by Nasal route 3 (three) times daily. . 60 mL 0    sodium chloride 3% 3 % nebulizer solution Take 4 mLs by nebulization as needed for Cough (chest congestion). 360 mL 11    spironolactone (ALDACTONE) 50 MG tablet TAKE 1 TABLET BY MOUTH ONCE DAILY 90 tablet 3    tezepelumab-ekko (TEZSPIRE) 210 mg/1.91 mL (110 mg/mL) PnIj Inject 210 mg into the skin every 28 days. 1.91 mL 11    tiZANidine (ZANAFLEX) 4 MG tablet Take 1 tablet (4 mg total) by mouth every 12 (twelve) hours. 60 tablet 2    triamcinolone acetonide 0.025% (KENALOG) 0.025 % cream Apply topically 2 (two) times daily. PRN rash and itching of ear. Mild steroid cream. 30 g 0    TRUE METRIX GLUCOSE TEST STRIP Strp TEST BLOOD SUGAR ONE TIME DAILY 100 strip 3    TRUEPLUS LANCETS 33 gauge Misc TEST BLOOD SUGAR  ONE TIME DAILY 100 each 3    [DISCONTINUED] olopatadine-mometasone 665-25 mcg/spray [place] 2 sprays by Nasal route 2 (two) times daily. 29 g 11     No current facility-administered medications on file prior to visit.

## 2025-07-16 ENCOUNTER — TELEPHONE (OUTPATIENT)
Dept: ORTHOPEDICS | Facility: CLINIC | Age: 80
End: 2025-07-16
Payer: MEDICARE

## 2025-07-16 NOTE — TELEPHONE ENCOUNTER
Spoke to the patient who stated that she would like for us to reorder her the lidocaine patches for her to the [facility] pharmacy since her pharmacy was never able to get the approval through her insurance to her pharmacy. Dr. Navarro stated to that we can reorder them an her will send them to [facility] the patient verbalized understanding         Copied from CRM #[id number]. Topic: General Inquiry - Patient Advice  >> Jul 16, 2025 12:57 PM Suzan wrote:  Type:  Patient Returning Call    Who Called:Shireen  Who Left Message for Patient:  Does the patient know what this is regarding?:Medication for hand  Would the patient rather a call back or a response via [facility]? callback  Best Call Back Number:[phone]  Additional Information: Need to speak with nurse

## 2025-07-17 RX ORDER — LIDOCAINE 50 MG/G
1 PATCH TOPICAL DAILY
Qty: 14 PATCH | Refills: 0 | Status: SHIPPED | OUTPATIENT
Start: 2025-07-17 | End: 2025-07-31

## 2025-07-30 DIAGNOSIS — I25.118 CORONARY ARTERY DISEASE OF NATIVE ARTERY OF NATIVE HEART WITH STABLE ANGINA PECTORIS: ICD-10-CM

## 2025-07-30 RX ORDER — ATORVASTATIN CALCIUM 40 MG/1
40 TABLET, FILM COATED ORAL
Qty: 90 TABLET | Refills: 3 | Status: SHIPPED | OUTPATIENT
Start: 2025-07-30

## 2025-07-30 NOTE — TELEPHONE ENCOUNTER
Refill Routing Note   Medication(s) are not appropriate for processing by [facility] for the following reason(s):        Required labs outdated    [facility] action(s):  Defer     Requires labs : Yes             Appointments  past 12m or future 3m with PCP    Date Provider   Last Visit   2/3/2025 Laron Chaudhari MD   Next Visit   Visit date not found Laron Chaudhari MD   ED visits in past 90 days: 0        Note composed:1:20 PM 07/30/2025

## 2025-07-30 NOTE — TELEPHONE ENCOUNTER
Care Due:                  Date            Visit Type   Department     Provider  --------------------------------------------------------------------------------                                [facility] INTERNAL  Last Visit: 02-      FOLLOW UP    MEDICINE       Laron Chaudhari  Next Visit: None Scheduled  None         None Found                                                            Last  Test          Frequency    Reason                     Performed    Due Date  --------------------------------------------------------------------------------    Lipid Panel.  12 months..  atorvastatin.............  07- 07-    Vitamin D...  12 months..  cholecalciferol,.........  Not Found    Overdue    [employer] Embedded Care Due Messages. Reference number: [id number].   7/30/2025 8:32:57 AM CDT

## 2025-08-01 ENCOUNTER — OFFICE VISIT (OUTPATIENT)
Dept: PAIN MEDICINE | Facility: CLINIC | Age: 80
End: 2025-08-01
Payer: MEDICARE

## 2025-08-01 VITALS
BODY MASS INDEX: 21.43 KG/M2 | SYSTOLIC BLOOD PRESSURE: 112 MMHG | HEART RATE: 76 BPM | RESPIRATION RATE: 17 BRPM | HEIGHT: 64 IN | DIASTOLIC BLOOD PRESSURE: 61 MMHG | WEIGHT: 125.56 LBS

## 2025-08-01 DIAGNOSIS — M47.816 LUMBAR SPONDYLOSIS: ICD-10-CM

## 2025-08-01 DIAGNOSIS — M53.3 COCCYDYNIA: Primary | ICD-10-CM

## 2025-08-01 DIAGNOSIS — M47.812 CERVICAL SPONDYLOSIS: ICD-10-CM

## 2025-08-01 DIAGNOSIS — M79.18 MYOFASCIAL MUSCLE PAIN: ICD-10-CM

## 2025-08-01 PROCEDURE — 3074F SYST BP LT 130 MM HG: CPT | Mod: CPTII,HCNC,S$GLB, | Performed by: PHYSICIAN ASSISTANT

## 2025-08-01 PROCEDURE — 20553 NJX 1/MLT TRIGGER POINTS 3/>: CPT | Mod: HCNC,S$GLB,, | Performed by: PHYSICIAN ASSISTANT

## 2025-08-01 PROCEDURE — 3078F DIAST BP <80 MM HG: CPT | Mod: CPTII,HCNC,S$GLB, | Performed by: PHYSICIAN ASSISTANT

## 2025-08-01 PROCEDURE — 1159F MED LIST DOCD IN RCRD: CPT | Mod: CPTII,HCNC,S$GLB, | Performed by: PHYSICIAN ASSISTANT

## 2025-08-01 PROCEDURE — 3288F FALL RISK ASSESSMENT DOCD: CPT | Mod: CPTII,HCNC,S$GLB, | Performed by: PHYSICIAN ASSISTANT

## 2025-08-01 PROCEDURE — 1125F AMNT PAIN NOTED PAIN PRSNT: CPT | Mod: CPTII,HCNC,S$GLB, | Performed by: PHYSICIAN ASSISTANT

## 2025-08-01 PROCEDURE — 1101F PT FALLS ASSESS-DOCD LE1/YR: CPT | Mod: CPTII,HCNC,S$GLB, | Performed by: PHYSICIAN ASSISTANT

## 2025-08-01 PROCEDURE — 99999 PR PBB SHADOW E&M-EST. PATIENT-LVL V: CPT | Mod: PBBFAC,HCNC,, | Performed by: PHYSICIAN ASSISTANT

## 2025-08-01 PROCEDURE — 99213 OFFICE O/P EST LOW 20 MIN: CPT | Mod: 25,HCNC,S$GLB, | Performed by: PHYSICIAN ASSISTANT

## 2025-08-01 RX ORDER — METHYLPREDNISOLONE ACETATE 40 MG/ML
40 INJECTION, SUSPENSION INTRA-ARTICULAR; INTRALESIONAL; INTRAMUSCULAR; SOFT TISSUE
Status: COMPLETED | OUTPATIENT
Start: 2025-08-01 | End: 2025-08-03

## 2025-08-01 NOTE — PROGRESS NOTES
Established Patient Interventional Pain Clinic Visit      PCP: Laron Chaudhari MD    Chief Complaint   Patient presents with    Low-back Pain     Patient has pain in lower back that radiates into hips and pain in neck/shoulder area radiates down arm and hands are numb.  Pain level 9/10         Notes:    Subjective-      Interval History (8/1/2025):  Shireen Felix presents today for follow-up visit.  Patient was last seen on 5/27/2025.  The patient is here today for Trigger point injections to address neck and upper back pain. She also has pain in lower back, buttocks . She would like to repeat SI block that she had in the past. Patient reports pain as 9/10 today.    Interval History (5/27/2025):   Shireen Felix presents today for follow-up visit.  Patient was last seen on 4/15/2025. She had TPI of lower back at her last visit. Reports at least 60% relief. She is interested in having repeat injections next month. Patient reports pain as 7/10 today.       Interval History (4/15/25):  Shireen Felix presents today for follow-up visit.  Patient was last seen on 8/1/2024.  Patient reports pain as 7.5/10 today.   Patient was admitted after 12/31/24 due to COVID-19. See hospital records below:        She recently completed home therapy/ physical therapy with [facility]. Patient c/o bilateral lower back pain. Since she started using her Rolator again, she is having more pain. She requests injections today (trigger point injections).      Interval History (8/1/2024):   Shireen Felix presents today for follow-up visit.  Patient was last seen on 4/29/2024. Patient is here today for trigger point injections.  Patient reports pain as 10/10 today. She is no longer taking Gabapentin due to side effects.  At this time she is taking Tizanidine as needed.  Patient was hospitalized recently due to CHF and gastroenteritis. See note below. She has home health and physical therapy.        Interval History  (4/29/2024):  Shireen Felix presents today for follow-up visit.  Patient he is seen today for back pain.  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  She recently finished up a steroid taper as well as antibiotics.  She has some upcoming appointments to follow up with Cardiology.  She states while in the hospital she had a lot of coughing but she feels made her lower back and upper back pain worse.  She feels like she is having diffuse spasms in the back.  At times she will have pain that radiates down the back of the legs.  She has pain over bilateral hips especially never lying on either side.  She continues to take her gabapentin 300 mg 3 times a day reports she has not been taking the tizanidine.  She is inquiring about injections today.  She also asked for hydrocodone to help with her pain as this is what gave her relief in the hospital.  Patient denies night fever/night sweats, urinary incontinence, bowel incontinence, significant weight loss and significant motor weakness.   Patient denies any other complaints or concerns at this time.      Interval history 02/27/2024  Patient presents status post C6-7 interlaminar epidural steroid injection with right paramedian approach 02/07/2024.  Patient reports approximately 70%-80% sustained relief in neck, upper extremity radicular symptoms following epidural steroid injection.  She reports significant improvement in radicular symptoms, numbness and tingling in bilateral arms and compromise in hand  strength and dexterity.  Today she reports residual pain particularly in the left shoulder territory.  Of note patient has not received glenohumeral joint injection since 2020 with Dr. Dias.  At that time she does report noticeable improvement exceeding 3 months in duration with her prior procedure.  Today she does report pain is exacerbated with overhead movements and shoulder internal/external  rotation.  She is continued physician directed physical therapy exercises over the last 8 weeks from 12/27/2023 through 02/27/2024 with noticeable improvement in pain, range of motion and functionality and neck and arms and shoulders.    Interval History (8/1/2025):  Shireen Felix presents today for follow-up visit.  Patient was last seen on 11/8/2023. At that visit, the plan was to perform TPI on upper back and return to clinic 3-4 weeks for TPI on lower back. Patient reports pain as 8/10 today.  Patient reports previous TPI did help but she continues to have pain on R side of upper back, bilateral arm pain and numbness/tingling in both hands. She also has pain involving R shoulder joint.     Interval History (11/08/2023):  Shireen Felix presents today for follow-up visit.  she underwent sacrococcygeal joint injection on 12/31/24.  The patient reports that she is/was better following the procedure.  she reports 80% pain relief.  The changes lasted so far.  The changes have continued through this visit. The patient now c/o pain related to myofascial pain in upper back. She also pain in lower back (lumbar paraspinal) but the upper back pain is most concerning at this time.  Patient reports pain as 8/10 today.  The patient also c/o itching in her scalp with hair loss over the past two months.    Interval history 10/11/2023  Patient presents status post bilateral sacroiliac joint and greater trochanteric bursa injection 07/28/2023.  Patient reports approximately 90% sustained relief overlying bilateral sacroiliac joint and GT bursa.  Today she presents following a mechanical fall.  Patient reports approximately 5 days prior going to the bathroom in the middle of the night, losing her balance and hitting the back of her head and lower back.  Today she reports pain which is constant which is rated an 8/10.  Patient reports pain along bilateral cervical paraspinous musculature which is exacerbated with  cervical flexion/extension and lateral flexion.  She reports this pain has improved over the last few days.  Her primary concern is pain along her coccyx.  Patient reports tenderness to touch in pain with sitting as well as sitting or standing.  Patient is requesting x-rays for evaluation.  She also would like to restart physical therapy.  Of note she is going to [facility] in [place] and would like to restart dry needling, soft massage and assistance with balance.    Interval History (6/27/2023):  Shireen Felix presents today for follow-up visit.  Patient was last seen on 5/16/2023. She reports some improvement in her pain with TPIs given at last visit. She reports continued variations in her BP throughout the day. She has followed up with Neurology who has made changes to her seizure medications. Seems to be tolerating med change well. Continues to monitor BP. Has followed up with PCP and other specialist to rule out other causes for BP changes (renal, intracranial, etc).  Patient reports pain as 7/10 today. No changes on CT compared to previous.  Interval History (6/14/2023):  Shireen Felix presents today for trigger point injections.  Patient was last seen on 06/09/2023. Patient reports pain as 8/10 today. She continues to report pain as primarily located in her lower lumbosacral region with radiation into her buttocks, right worse than left, and wrapping around her lateral hips. She reports additional falls since last visit. She reports worsening neck pain with radiation into her left arm down to her hand with cramping and burning.  She also reports worsening symptoms down both legs.  interfering with her ability to walk at times due to significant weakness. She reports repeated falls due to her legs giving out from underneath her unexpectedly.       Interval History (6/9/2023):  Shireen Felix presents today via telemed for follow-up visit.  Patient was last seen on 05/16/2023. Last injection  bilateral L4/5 TFESI on 4/26/23 with 80% relief. Patient reports pain as 10/10 today. She reports pain that began over the past several weeks, became severe 2 days ago. She reports pain is primarily located in her lower lumbosacral region with radiation into her buttocks, right worse than left, and wrapping around her lateral hips. Pain is similar to that prior to SIJ and GT bursa injection, last SIJ injection 11/16/2021 with excellent relief until a few weeks ago when pain returned. She reports in the past she has also received trigger point injections in her lower back that were very helpful.    Interval History (5/16/2023): Shireen Felix presents today for follow-up visit.  she underwent bilateral L4/5 TFESI on 4/26/23.  The patient reports that she is/was better following the procedure.  she reports 80% pain relief.  The changes lasted 4 weeks so far.  The changes have continued through this visit.  Patient reports pain as 7/10 today. She reports the procedure itself was very painful. Reports she did not feel sedated at all compared to previous procedures with other providers. She is not interested in pursuing additional procedures secondary to the painful procedure.  She reports worsening neck pain with radiation into her right arm down to her hand with cramping and burning.    Interval History (3/9/2023):  Shireen Felix presents today for follow-up visit for CT and EMG review.  Patient was last seen on 2/1/2023. At that visit, the plan was to order updated CT of her cervical and lumbar spine as well as EMG of her bilateral upper extremities. Patient reports pain as 7/10 today.  She reports worsening neck pain with radiation into her left arm down to her hand with cramping and burning.  She also reports worsening symptoms down both legs.  interfering with her ability to walk at times due to significant weakness. Has an appointment with Dr. Navarro on 03/21/2023. Patient fell hit head on concrete  2-11-23, and had a seizure shortly after.  She reports repeated falls due to her legs giving out from underneath her unexpectedly. Referred to Neurology.  She is also currently on antibiotics secondary to having 10 teeth pulled     Interval History (1/31/2023):  Shireen Felix presents today for follow-up visit.  Patient was last seen on 10/4/2022. At that visit, the plan was to schedule bilateral L3-5 MBB followed 2 weeks later by XANDER.  She canceled these procedures and reports at this time she is not interested in scheduling.  She reports she has a  and goes to the gym 3-4 times per week which includes walking on a treadmill.   Patient reports pain as 7/10 today. She reports worsening neck pain with radiation into her left arm down to her hand with cramping and burning. This has been severe over the past week. Last cervical CT in 2019, she reports she has sustained multiple falls since then. She also reports worsening symptoms down her left leg, interfering with her ability to walk at times due to significant weakness.      Interval history 10/04/2022  Ms. Felix is a 76-year-old female with past medical history significant for cerebral aneurysm status post craniotomy and repair, cerebral vascular accident, left V1 distribution post herpetic neuralgia, depression, glaucoma, asthma/COPD, coronary artery disease, GERD, nicotine dependence, type 2 diabetes who presents to establish care, previous Dr. Dias and Dr. Fulton patient.  Today patient reports pain in the lower back and legs.  Pain is constant today is rated 7/10.  Patient reports pain in a bandlike distribution in the lower back which radiates down the posterior aspects of bilateral lower extremities in L5-S1 distribution to mid thigh.  Pain is described as burning and aching in nature.  Pain is exacerbated when moving from sitting to standing and standing and ambulating just a few feet.  Patient does report associated weakness in the  lower extremities associated with her pain.  Patient reports she is able to ambulate with assistive device, walker only a few feet before requiring rest.  Pain has been improved with prior procedures, including medial branch block and transforaminal epidural steroid injection.  Patient is interested in pursuing repeat injection.  Of note patient has trialed medicinal marijuana with Dr. Mckeon and reports palpitations side effect.  Patient has discontinued tramadol, tizanidine and gabapentin.  Pain is improved with prednisone which she takes prescribed by her pulmonologist.    Interval history:  Dr. Fulton:  05/10/2021-05/06/2022  Shireen Felix is a 76 y.o. female  who is presenting with a chief complaint of Neck Pain. The patient began experiencing this problem insidiously, and the pain has been gradually worsening over the past 6 month(s). The pain is described as throbbing, cramping, aching and heavy and is located in the bilateral cervical spine. Pain is intermittent and lasts hours. The  pain is nonradiating. The patient rates her pain a 7 out of ten and interferes with activities of daily living a 7 out of ten. Pain is exacerbated by rotation of the cervical spine, and is improved by rest. Patient reports no prior trauma, no prior spinal surgery      This patient is a 74 y.o. female who presents today complaining of the above noted pain/s. The patient describes the pain as follows.  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  She reports approximately 2 years ago she had to sudden deaths in the family which caused very stressful time for her and resulted in shingles rash in the left V1 distribution however she reports having excruciating pain in the left V1 and V2 distributions.  She denies having difficulty with eating food and brushing her teeth on the left side of her  face however the left lateral side of her nose gets her excruciating pain.  She has been tried on numerous medications in the past including gabapentin, Lyrica, Valtrex, Celebrex, ibuprofen which have all provided minimal benefit however steroids have been most helpful.  Today she rates her pain as an 4/10 and describes a constant burning sensation the left side of her face in addition to radiation into her bilateral lower extremities, her right shoulder, her left upper extremity occasionally as a pins and needle sensation.  She does report having numbness and weakness in her left lower extremity.  She has been physical therapy which she completed in February of 2019 however this caused most of her low back and leg symptoms to worsen in addition to her post herpetic neuralgia to worsen.  She denies having bowel bladder difficulties.  Her symptoms are worse with activity such as walking in her somewhat improved with rest however she had finds it difficult to get into a comfortable position. She has been using topical lidocaine patches and applying to the left side of her face which does provide significant benefit.  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     Shireen Felix is a 76 y.o. female  who is presenting with a chief complaint of lumbar spine. The patient began experiencing this problem insidiously, and the pain has been gradually worsening over the past 2 year(s). The pain is described as throbbing, shooting, burning, aching and electrical and is located in the bilateral lumbar spine. Pain is intermittent and lasts hours. The pain radiates to bilateral lower extremities. The patient rates her pain a 7 out of ten and interferes with activities of daily living a 6 out of ten. Pain is exacerbated by flexion of the lumbar spine, and is improved by rest. Patient reports  no prior trauma, no prior spinal surgery     Interval HPI 06/03/2020: Dr. Dias:  Ms. Felix returns to clinic for follow-up.  She underwent a T12-L1 transforaminal epidural steroid injection on March 10, 2020 and she reports that this has provided significant pain relief for her which radiated into her right leg prior to the injection.  She does report that his symptoms have returned and the injections worn off her pain is currently rated 7/10.  She was scheduled to undergo bilateral lumbar radiofrequency ablation prior to corona virus however she would like to hold off on that at this time and pursue repeat injection in the low back.  Unfortunately she also reports that she had her wheelchair fall over a few weeks ago and this has resulted in bilateral shoulder pain.  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  She finds that rest does provide some pain relief on activity causes her symptoms to worsen.  She has been able to walk significantly more after her most recent injection in March reported she is able to walk several steps before having to sit rest however this has a vast improvement from prior to the injection.    Interval HPI:  02/12/2020; Dr. Dias  Ms. Felix returns to clinic for follow-up.  She underwent bilateral lumbar medial branch blocks on January 31, 2020 and reports 100% symptomatic pain relief for approximately 1 week and his symptoms slowly began to return.  She continues to have some right-sided lower thoracic posterior pain which was not completely dressed with the medial branch blocks.  Today she rates her pain as an 8/10 which is located primarily in the axial lumbar spine and the lower right posterior thoracic region.  She denies having numbness weakness in the legs but does continue to have a constant aching and throbbing sensation in the low back.    Interval HPI 01/23/2020: Dr. Dias:  Ms. Felix returns to clinic for follow-up.  She reports that she underwent bilateral L4/5 transforaminal epidural steroid injections in November 2019 reports ongoing 80% symptomatic pain relief.  She has also been participating physical therapy which she has found to be very helpful however 4 days ago she started to do some leg raise is in her bed and she felt severe pain in her low back which has not subsided.  She denies having any radiation except for around her flank into her anterior abdomen.  She feels like the pain sometimes comes straight through from her back to her abdomen.  Today she rates her pain as a 10/10 describes it as a constant aching and sharp pain. She has been using a heating pad which is minimally helpful and she has been holding off on physical therapy at this time. She denies having bowel bladder difficulty.     Interval History: Patient was seen on 8/4/19. At that time she underwent bilateral L3/4 TF XANDER.  The patient reports that she is/was better following the procedure.  she reports 75% pain relief.  She had a fall, then pain increased. She is currently living at [facility]. She is doing at home therapy there, which is helping. She is in wheelchair now but hopes to transition to walker soon.  After the fall, she was not able to get out of the bed.      Initial History of Present Illness:  Dr. Dias  This patient is a 74 y.o. female who presents today complaining of the above noted pain/s. The patient describes the pain as follows.  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  She reports approximately 2 years ago she had to sudden deaths in the family which caused very stressful time for her and resulted in shingles rash in the left V1 distribution however she reports having excruciating pain in the left V1 and V2 distributions.  She denies having  difficulty with eating food and brushing her teeth on the left side of her face however the left lateral side of her nose gets her excruciating pain.  She has been tried on numerous medications in the past including gabapentin, Lyrica, Valtrex, Celebrex, ibuprofen which have all provided minimal benefit however steroids have been most helpful.  Today she rates her pain as an 8/10 and describes a constant burning sensation the left side of her face in addition to radiation into her bilateral lower extremities, her right shoulder, her left upper extremity occasionally as a pins and needle sensation.  She does report having numbness and weakness in her left lower extremity.  She has been physical therapy which she completed in February of 2019 however this caused most of her low back and leg symptoms to worsen in addition to her post herpetic neuralgia to worsen.  She denies having bowel bladder difficulties.  Her symptoms are worse with activity such as walking in her somewhat improved with rest however she had finds it difficult to get into a comfortable position. She has been using topical lidocaine patches and applying to the left side of her face which does provide significant benefit.  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    - pertinent negatives: No fever, No chills, No weight loss, No bladder dysfunction, No bowel dysfunction, No saddle anesthesia  - pertinent positives: generalized nonspecific Lower Extremity weakness bilaterally    - medications, other therapies tried (physical therapy, injections):     >> NSAIDs, Tylenol, Tramadol, Norco, gabapentin, lyrica, flexeril and medrol dose pack    >> Has previously undergone Physical Therapy    Pain injections:  Dr. Oshea:  -02/07/2024: C6-7 interlaminar epidural steroid injection with right paramedian approach  -10/25/2023:  Sacrococcygeal joint injection with 80% relief  -07/28/2023: Bilateral sacroiliac joint and greater trochanteric bursa injection  -04/26/2023: bilateral L4/5 TFESI with 80% relief    -11/16/2021: Right-sided SI joint and greater trochanteric bursa injection; Dr. Fulton  -07/09/2020: Bilateral glenohumeral joint injection; Dr. Dias  -06/19/2020: Right-sided T12/L1 transforaminal epidural steroid injection; Dr. Dias  -03/10/2020:  T12/L1 transforaminal epidural steroid injection; Dr. Dias  -01/31/2020: Bilateral L3-5 medial branch blocks; Dr. Dias  -11/12/2019: Bilateral L4/5 transforaminal epidural steroid injection; Dr. Dias    Imaging / Labs / Studies (reviewed on 8/1/2025):  Thoracic X-rays: 4/22/24  XR THORACIC SPINE AP LATERAL   CLINICAL HISTORY:  Pain in thoracic spine   TECHNIQUE:  AP and lateral views of the thoracic spine   COMPARISON:  03/15/2022   FINDINGS:  There is mild dextrocurvature of the mid to lower thoracic spine.  The vertebral bodies demonstrate a normal height.  The disc space heights appear to be relatively well maintained. The pedicles are intact.  Minimal spondylosis noted at a few levels within the upper thoracic spine and lower thoracic spine.  There is significant disc space narrowing along with some spondylosis present at the L1-2 and L3-4.     Impression:   As above      Electronically signed by:David Wallace DO  Date:                                            04/22/2024  Time:       10/11/23    X-Ray Cervical Spine 5 View W Flex Extxt    Narrative    FINDINGS:  There is no acute fracture or compression deformity. Bones are demineralized. No aggressive lytic or blastic lesion seen.  Mild cervical dextrocurvature noted.  There is mild straightening of the normal cervical lordosis as well as mild anterolisthesis of C4 on C5.  No evidence of instability with flexion/extension.  Remaining alignment is unremarkable.  There is multilevel loss of intervertebral disc height with  associated uncovertebral hypertrophy and endplate ossified ptosis, most prominent at C5-C6 and C6-C7.  More mild degenerative disc changes at C3-C4 and C4-C5. Multilevel bilateral facet arthropathy also noted. No gross bony spinal canal stenosis.  Moderate to severe left neuroforaminal stenosis at the C5-C6 and C6-C7 levels.  Additional mild neuroforaminal stenosis at the mid to upper cervical levels.  Left carotid calcifications noted.  Visualized soft tissues are otherwise unremarkable.    Impression  Mild cervical thoracic curvature and straightening of the normal cervical lordosis.  Mild anterolisthesis of C4 on C5 without evidence of instability.  Osteopenia and multilevel degenerative changes as above.      Sacrum/Coccyx X-rays:  EXAM: XR SACRUM AND COCCYX   HISTORY: Pain   FINDINGS:   3 views were obtained of the sacrum/coccyx.   Bilateral hip replacement hardware.  Degenerative changes within the visualized lumbar spine.  No visualized fracture.   The bones are osteopenic.      Impression:   No acute findings are identified.      CT Cervical spine 06/23/2023  FINDINGS:  There is unchanged grade 1 anterolisthesis of C3 on C4 and C4 on C5.  There is no new spondylolisthesis.  There is no loss of vertebral body height.  There is multilevel degenerative disc space narrowing most significant at C5-C6 and C6-C7.  There is unchanged diffuse degenerative facet arthropathy with posterior disc osteophyte complexes and uncovertebral osteophytosis resulting in varying degrees of canal and foraminal stenosis.  This is incompletely evaluated on this exam.     The included portions of the posterior fossa are normal.  The craniocervical junction is symmetric.  The atlantoaxial articulation is normal.  The airway is widely patent.  The thyroid gland is normal.  There is no cervical adenopathy.  The visualized lung apices are clear.     Impression:     Multilevel degenerative spondylolisthesis and spondylosis resulting in  varying degrees of canal and foraminal stenosis, grossly unchanged from prior exam.    CT lumbar spine 06/23/2023  FINDINGS:  Five non-rib-bearing lumbar type vertebral bodies are present.  There is mild Levoscoliotic curvature of the lumbar spine centered at L3, similar compared to the prior.     With regards to alignment, there is minimal right lateral listhesis of L1 on L2 and L2 on L3.  There is minimal left lateral listhesis of L3 on L4.  No anterolisthesis or retrolisthesis.     There are chronic compression deformities through the superior endplate of T12, inferior endplate of L1 and superior endplate of L2.  Degree of vertebral body height loss is stable since 02/07/2023.  Vertebral body heights from L3-L5 are preserved.     Mild to severe degenerative disc changes are present throughout the lumbar spine, worst at the right half of L3-L4 and L4-L5.  Partially calcified disc bulges are seen at L2-L3, L3-L4, L4-L5 and L5-S1.  Evaluation for central canal narrowing is limited without intrathecal contrast.  Degrees of central canal stenosis appear worst at L3-L4 and L4-L5.     Hypertrophic facet changes are present throughout the lumbar spine, worst at L3-L4 and L4-L5.     Paraspinal soft tissues appear within normal limits.  Both SI joints appear intact with mild degenerative changes, similar compared to the prior.     Impression:     1. Multilevel degenerative disc and hypertrophic facet changes similar compared to 02/07/2023.  2. Chronic appearing compression deformities at T12, L1 and L2, similar compared to 02/07/2023.  Dense of acute injury.      01/23/20    X-Ray Lumbar Spine Ap And Lateral  FINDINGS:  Levoscoliosis present.  Vertebral body heights stable.  Alignment unchanged without spondylolisthesis.  Similar appearing multilevel degenerative disc height loss and osteophyte findings noted.  Multilevel facet degenerative findings remain.  Aorta iliac atherosclerotic calcification.  Colonic constipation  "findings present.    EMG 02/24/2023  IMPRESSION  1. ABNORMAL study  2. 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  There is an acute on chronic radiculopathy of the LEFT C6 nerve root and a subacute on chronic of the RIGHT C4 and C5 nerve roots and a CHRONIC radiculopathy of bilateral C4-T1 nerve roots    Review of Systems:  CONSTITUTIONAL: patient denies any fever or  chills.   MUSCULOSKELETAL:  - patient complains of the above noted pain/s (see chief pain complaint)  NEUROLOGICAL:   - pain as above  - strength in Upper and Lower extremities is decreased, BILATERALLY  - sensation in Upper extremities is intact, BILATERALLY  - patient denies any loss of bowel or bladder control      PSYCHIATRIC: patient denies any change in mood    Other:  All other systems reviewed and are negative      Physical Exam:    Vitals:    08/01/25 0926   BP: 112/61   Pulse: 76   Resp: 17   Weight: 56.9 kg (125 lb 8.8 oz)   Height: 5' 4" (1.626 m)     Body mass index is 21.55 kg/m².   (reviewed on 8/1/2025)       GENERAL:  Patient is in no acute distress, alert and oriented x3.  PSYCH:  Mood and affect appropriate.  SKIN: Skin color normal.  HEAD/FACE:  Normocephalic, bruising along right lower jaw, Cranial nerves grossly intact.  GI:  Soft and non-tender.  BACK: Straight leg raising in the sitting and supine positions is negative to radicular pain. There is mild pain to palpation over the facet joints of the lumbar spine or spinous processes.  Reduced range of motion with pain reproduction.  TTP diffusely over lower lumbar- paraspinal ricky.  EXTREMITIES: No deformities, edema, or skin discoloration. Good capillary refill.  MUSCULOSKELETAL:  Unable to stand on heels and toes, patient " in wheelchair today  SIJ testing:  - TTP over SI joint: Present, bilaterally  - Madhavi's/ Hank's: Positive    - TTP over Piriformis: Present, bilaterally  - TTP over GT Bursa: Present, bilaterally  Musculoskeletal - Cervical Spine:  - Pain on flexion of cervical spine: Present   - Pain on extension of cervical spine: Present   - Cervical facet loading: Present   - TTP over the cervical facet joints: Present  - TTP over the cervical paraspinals: Present      Neuro - Upper Extremities:  - BUE Strength:R/L: D: 5/5; B: 5/5; T: 5/5; WF: 5/5; WE: 5/5; IO: 5/5  - Extremity Reflexes: Brisk and symmetric throughout  - Sensory: Sensation to light touch intact bilaterally    NEURO: Bilateral upper and lower extremity coordination and muscle stretch reflexes are physiologic and symmetric. No loss of sensation is noted.  GAIT:  Patient presents in wheelchair today.  Antalgic gait.      Assessment:    Shireen Felix is a 79 y.o. year old female who is presenting with   Encounter Diagnoses   Name Primary?    Lumbar spondylosis     Myofascial muscle pain     Coccydynia Yes    Cervical spondylosis        1. Coccydynia  Case Request-RAD/Other Procedure Area: Sacrococcygeal and Ganglion Impar Block  with TPI of Lumbar Paraspinal My ([facility])([place] patient, please schedule with Dr. Yates)    NEEDS ADDITIONAL SEDATION AND MORE TIME BEFORE INJECTION      2. Lumbar spondylosis  Case Request-RAD/Other Procedure Area: Sacrococcygeal and Ganglion Impar Block  with TPI of Lumbar Paraspinal My ([facility])([place] patient, please schedule with Dr. Yates)    NEEDS ADDITIONAL SEDATION AND MORE TIME BEFORE INJECTION      3. Myofascial muscle pain  methylPREDNISolone acetate injection 40 mg    Case Request-RAD/Other Procedure Area: Sacrococcygeal and Ganglion Impar Block  with TPI of Lumbar Paraspinal My ([facility])([place] patient, please schedule with Dr. Yates)    NEEDS ADDITIONAL SEDATION AND MORE TIME BEFORE INJECTION      4. Cervical spondylosis   methylPREDNISolone acetate injection 40 mg           Plan:    1. Interventional:  Schedule patient for repeat Sacrococcygeal and Ganglion Impar Block AND TPI of bilateral lumbar paraspinal ricky. Please note, this patient needs add'l sedation and more time prior to injection ([place] patient).     Trigger Point Injections administered today for cervical myofascial pain. See note below.      Anticoagulation:  Yes, Plavix: does not need to stop.  --secondary prophylaxis: Coronary artery disease, diastolic heart failure; cardiologist: Dr. Dye    2. Pharmacologic:      report:  Reviewed and consistent with medication use as prescribed.      -No longer taking Gabapentin. Discontinued secondary to SE.   --Unable to take PO NSAIDs due to taking Plavix.    -Continue tizanidine 4-8 mg  BID prn pain.We have discussed potential deleterious side effects associated with this medication including  dizziness, drowsiness, dry mouth or tingling sensation in the upper or lower extremities.      3. Rehabilitative:   -We discussed continuing physician directed at home physical therapy to help manage the patient/s painful condition. The patient was counseled that muscle strengthening will improve the long term prognosis in regards to pain and may also help increase range of motion and mobility.   Patient recently completed hh/physical therapy .    4. Diagnostic:  Previous imaging studies reviewed.    5. Consults: (PRN)  No new referrals at this time.    6.  Follow up: can return to clinic  4 weeks after above intervention , virtual visit is appropriate.    Augustin Nichols PA-C  Interventional Pain Medicine            Procedure note for  Trigger Point Injection:   The procedure was discussed with the patient including complications of nerve damage,  bleeding, infection, and failure of pain relief.   Trigger points were identified by palpation and marked. Alcohol swab prep of sites done. A mixture of 7mL 1% lidocaine + 40 mg Depo-Medrol  was prepared (8mL total).   A 25-gauge needle was advanced to the point of maximal tenderness, and the medication mixture  was injected after negative aspiration. Patient tolerated the procedure well and without complications. Patient was monitored for 15 min following the injection before discharged from the clinic.  Sites injected included:   reva cervical paraspinal rikcy: upper and middle trapezius ricky, bilaterally        The above plan and management options were discussed at length with patient. Patient is in agreement with the above and verbalized understanding.  - I discussed the goals of interventional chronic pain management with the patient on today's visit. We discussed a multimodal and systematic approach to pain.  This includes diagnostic and therapeutic injections, adjuvant pharmacologic treatment, physical therapy, and at times psychiatry.  I emphasized the importance of regular exercise, core strengthening and stretching, diet and weight loss as a cornerstone of long-term pain management.    - This condition does not require this patient to take time off of work, and the primary goal of our Pain Management services is to improve the patient's functional capacity.  - Patient Questions: Answered all of the patient's questions regarding diagnoses, therapy, treatment and next steps    Visit today included increased complexity associated with the care of the episodic problem of chronic pain which was addressed and continue to manage the longitudinal care of the patient due to the serious and/or complex managed problem(s) listed above.

## 2025-08-03 RX ADMIN — METHYLPREDNISOLONE ACETATE 40 MG: 40 INJECTION, SUSPENSION INTRA-ARTICULAR; INTRALESIONAL; INTRAMUSCULAR; SOFT TISSUE at 07:08

## 2025-08-04 ENCOUNTER — TELEPHONE (OUTPATIENT)
Dept: PAIN MEDICINE | Facility: CLINIC | Age: 80
End: 2025-08-04
Payer: MEDICARE

## 2025-08-26 ENCOUNTER — PATIENT MESSAGE (OUTPATIENT)
Dept: PAIN MEDICINE | Facility: HOSPITAL | Age: 80
End: 2025-08-26
Payer: MEDICARE

## 2025-08-28 ENCOUNTER — PATIENT MESSAGE (OUTPATIENT)
Dept: PAIN MEDICINE | Facility: HOSPITAL | Age: 80
End: 2025-08-28
Payer: MEDICARE

## 2025-09-02 ENCOUNTER — TELEPHONE (OUTPATIENT)
Dept: PAIN MEDICINE | Facility: CLINIC | Age: 80
End: 2025-09-02
Payer: MEDICARE

## 2025-09-04 ENCOUNTER — TELEPHONE (OUTPATIENT)
Dept: PAIN MEDICINE | Facility: CLINIC | Age: 80
End: 2025-09-04
Payer: MEDICARE

## 2025-09-04 DIAGNOSIS — J30.89 NON-SEASONAL ALLERGIC RHINITIS DUE TO OTHER ALLERGIC TRIGGER: ICD-10-CM

## 2025-09-04 DIAGNOSIS — N18.30 CONTROLLED TYPE 2 DIABETES MELLITUS WITH STAGE 3 CHRONIC KIDNEY DISEASE, WITHOUT LONG-TERM CURRENT USE OF INSULIN: Primary | ICD-10-CM

## 2025-09-04 DIAGNOSIS — E11.22 CONTROLLED TYPE 2 DIABETES MELLITUS WITH STAGE 3 CHRONIC KIDNEY DISEASE, WITHOUT LONG-TERM CURRENT USE OF INSULIN: Primary | ICD-10-CM

## 2025-09-04 DIAGNOSIS — J45.40 MODERATE PERSISTENT ASTHMA WITHOUT COMPLICATION: ICD-10-CM

## 2025-09-04 DIAGNOSIS — J41.1 MUCOPURULENT CHRONIC BRONCHITIS: Chronic | ICD-10-CM

## 2025-09-04 DIAGNOSIS — I25.118 CORONARY ARTERY DISEASE OF NATIVE ARTERY OF NATIVE HEART WITH STABLE ANGINA PECTORIS: ICD-10-CM

## 2025-09-04 DIAGNOSIS — I10 PRIMARY HYPERTENSION: ICD-10-CM

## 2025-09-04 RX ORDER — ALBUTEROL SULFATE 0.83 MG/ML
SOLUTION RESPIRATORY (INHALATION)
Qty: 180 ML | Refills: 1 | Status: SHIPPED | OUTPATIENT
Start: 2025-09-04

## 2025-09-04 RX ORDER — FLUTICASONE PROPIONATE 50 MCG
2 SPRAY, SUSPENSION (ML) NASAL
Qty: 48 G | Refills: 11 | OUTPATIENT
Start: 2025-09-04

## 2025-09-04 RX ORDER — EMPAGLIFLOZIN 10 MG/1
10 TABLET, FILM COATED ORAL
Qty: 90 TABLET | Refills: 0 | Status: SHIPPED | OUTPATIENT
Start: 2025-09-04

## 2025-09-04 RX ORDER — SACUBITRIL AND VALSARTAN 24; 26 MG/1; MG/1
1 TABLET, FILM COATED ORAL 2 TIMES DAILY
Qty: 180 TABLET | Refills: 11 | Status: SHIPPED | OUTPATIENT
Start: 2025-09-04

## 2025-09-04 RX ORDER — BISOPROLOL FUMARATE 5 MG/1
5 TABLET, FILM COATED ORAL DAILY
Qty: 90 TABLET | Refills: 1 | Status: SHIPPED | OUTPATIENT
Start: 2025-09-04

## 2025-09-04 RX ORDER — FLUTICASONE FUROATE, UMECLIDINIUM BROMIDE AND VILANTEROL TRIFENATATE 200; 62.5; 25 UG/1; UG/1; UG/1
1 POWDER RESPIRATORY (INHALATION) DAILY
Qty: 180 EACH | Refills: 1 | Status: SHIPPED | OUTPATIENT
Start: 2025-09-04

## 2025-09-04 RX ORDER — RANOLAZINE 1000 MG/1
1000 TABLET, EXTENDED RELEASE ORAL 2 TIMES DAILY
Qty: 180 TABLET | Refills: 11 | Status: SHIPPED | OUTPATIENT
Start: 2025-09-04

## 2025-09-04 RX ORDER — OMEPRAZOLE 40 MG/1
40 CAPSULE, DELAYED RELEASE ORAL EVERY MORNING
Qty: 90 CAPSULE | Refills: 1 | Status: SHIPPED | OUTPATIENT
Start: 2025-09-04

## 2025-09-04 RX ORDER — SPIRONOLACTONE 50 MG/1
50 TABLET, FILM COATED ORAL
Qty: 90 TABLET | Refills: 1 | Status: SHIPPED | OUTPATIENT
Start: 2025-09-04

## 2025-09-05 ENCOUNTER — HOSPITAL ENCOUNTER (OUTPATIENT)
Dept: RADIOLOGY | Facility: HOSPITAL | Age: 80
Discharge: HOME | End: 2025-09-05
Attending: PHYSICIAN ASSISTANT
Payer: MEDICARE

## 2025-09-05 ENCOUNTER — TELEPHONE (OUTPATIENT)
Dept: PAIN MEDICINE | Facility: CLINIC | Age: 80
End: 2025-09-05
Payer: MEDICARE
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
## 2025-09-05 PROCEDURE — 72100 X-RAY EXAM L-S SPINE 2/3 VWS: CPT | Mod: TC,HCNC,PO

## 2025-09-05 PROCEDURE — 72220 X-RAY EXAM SACRUM TAILBONE: CPT | Mod: TC,HCNC,PO

## (undated) DEVICE — WIRE X-SUP CHOICE PT .014X182

## (undated) DEVICE — KIT GLIDESHEATH SLEND 6FR 10CM

## (undated) DEVICE — WIRE GUIDE TEFLON 3CM .035 145

## (undated) DEVICE — KIT SYR REUSABLE

## (undated) DEVICE — DRAPE ANGIO BRACH 38X44IN

## (undated) DEVICE — CATH ANG PIGTAIL 4FR INFINITY

## (undated) DEVICE — ANGIOTOUCH KIT

## (undated) DEVICE — OMNIPAQUE 300MG 150ML VIAL

## (undated) DEVICE — CATH CV QD LUMN 6FRX110CM

## (undated) DEVICE — SHEATH INTRODUCER 6FR 11CM

## (undated) DEVICE — BAND TR COMP DEVICE REG 24CM

## (undated) DEVICE — CATH 4FR JL 3.5

## (undated) DEVICE — GLIDESHEATH SLENDER SS 5FR10CM

## (undated) DEVICE — CATH INFINITI 4F 3DRC 100CM

## (undated) DEVICE — PACK HEART CATH BR

## (undated) DEVICE — KIT MICROINTRO 4F .018X40X7CM